# Patient Record
Sex: MALE | Race: WHITE | NOT HISPANIC OR LATINO
[De-identification: names, ages, dates, MRNs, and addresses within clinical notes are randomized per-mention and may not be internally consistent; named-entity substitution may affect disease eponyms.]

---

## 2019-12-23 ENCOUNTER — OTHER (OUTPATIENT)
Age: 74
End: 2019-12-23

## 2019-12-23 DIAGNOSIS — M25.561 PAIN IN RIGHT KNEE: ICD-10-CM

## 2019-12-23 DIAGNOSIS — M25.562 PAIN IN RIGHT KNEE: ICD-10-CM

## 2020-01-02 ENCOUNTER — APPOINTMENT (OUTPATIENT)
Dept: ORTHOPEDIC SURGERY | Facility: CLINIC | Age: 75
End: 2020-01-02

## 2020-07-24 ENCOUNTER — EMERGENCY (EMERGENCY)
Facility: HOSPITAL | Age: 75
LOS: 1 days | Discharge: HOME | End: 2020-07-24
Attending: EMERGENCY MEDICINE | Admitting: EMERGENCY MEDICINE
Payer: MEDICARE

## 2020-07-24 VITALS
OXYGEN SATURATION: 97 % | SYSTOLIC BLOOD PRESSURE: 180 MMHG | HEART RATE: 57 BPM | TEMPERATURE: 98 F | DIASTOLIC BLOOD PRESSURE: 90 MMHG | RESPIRATION RATE: 17 BRPM

## 2020-07-24 VITALS
HEART RATE: 84 BPM | HEIGHT: 68 IN | DIASTOLIC BLOOD PRESSURE: 90 MMHG | RESPIRATION RATE: 16 BRPM | OXYGEN SATURATION: 97 % | TEMPERATURE: 98 F | SYSTOLIC BLOOD PRESSURE: 140 MMHG | WEIGHT: 255.07 LBS

## 2020-07-24 DIAGNOSIS — Z98.89 OTHER SPECIFIED POSTPROCEDURAL STATES: Chronic | ICD-10-CM

## 2020-07-24 DIAGNOSIS — Z98.49 CATARACT EXTRACTION STATUS, UNSPECIFIED EYE: Chronic | ICD-10-CM

## 2020-07-24 DIAGNOSIS — L05.91 PILONIDAL CYST WITHOUT ABSCESS: Chronic | ICD-10-CM

## 2020-07-24 DIAGNOSIS — N44.00 TORSION OF TESTIS, UNSPECIFIED: Chronic | ICD-10-CM

## 2020-07-24 DIAGNOSIS — I66.9 OCCLUSION AND STENOSIS OF UNSPECIFIED CEREBRAL ARTERY: Chronic | ICD-10-CM

## 2020-07-24 PROCEDURE — 90715 TDAP VACCINE 7 YRS/> IM: CPT

## 2020-07-24 PROCEDURE — 90471 IMMUNIZATION ADMIN: CPT

## 2020-07-24 PROCEDURE — 99285 EMERGENCY DEPT VISIT HI MDM: CPT

## 2020-07-24 PROCEDURE — 73030 X-RAY EXAM OF SHOULDER: CPT | Mod: 26,LT

## 2020-07-24 PROCEDURE — 99284 EMERGENCY DEPT VISIT MOD MDM: CPT | Mod: 25

## 2020-07-24 PROCEDURE — 72125 CT NECK SPINE W/O DYE: CPT | Mod: 26

## 2020-07-24 PROCEDURE — 73030 X-RAY EXAM OF SHOULDER: CPT

## 2020-07-24 PROCEDURE — 70450 CT HEAD/BRAIN W/O DYE: CPT | Mod: 26

## 2020-07-24 PROCEDURE — 72125 CT NECK SPINE W/O DYE: CPT

## 2020-07-24 PROCEDURE — 70450 CT HEAD/BRAIN W/O DYE: CPT

## 2020-07-24 RX ORDER — TETANUS TOXOID, REDUCED DIPHTHERIA TOXOID AND ACELLULAR PERTUSSIS VACCINE, ADSORBED 5; 2.5; 8; 8; 2.5 [IU]/.5ML; [IU]/.5ML; UG/.5ML; UG/.5ML; UG/.5ML
0.5 SUSPENSION INTRAMUSCULAR ONCE
Refills: 0 | Status: COMPLETED | OUTPATIENT
Start: 2020-07-24 | End: 2020-07-24

## 2020-07-24 RX ADMIN — TETANUS TOXOID, REDUCED DIPHTHERIA TOXOID AND ACELLULAR PERTUSSIS VACCINE, ADSORBED 0.5 MILLILITER(S): 5; 2.5; 8; 8; 2.5 SUSPENSION INTRAMUSCULAR at 09:31

## 2020-07-24 NOTE — ED PROVIDER NOTE - CARE PLAN
Principal Discharge DX:	Closed head injury, initial encounter  Secondary Diagnosis:	Scalp abrasion  Secondary Diagnosis:	Fall from chair, initial encounter  Secondary Diagnosis:	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  Secondary Diagnosis:	Hepatitis C

## 2020-07-24 NOTE — ED ADULT NURSE NOTE - PSH
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  Had surgery ( April 2013 )  H/O hernia repair    Pilonidal cyst  Had surgery ( 1969 )  S/P arthroscopic knee surgery  Bilateral ( 2005 )  S/P cataract surgery  Bilateral  S/P tonsillectomy    Torsion of testicle  Had surgery at age 14

## 2020-07-24 NOTE — ED PROVIDER NOTE - OBJECTIVE STATEMENT
pt is a 76 yo male who is on plavix for stents sp acs event was sitting in folding chair eating breakfast when his chair broke and he fell back striking head against corner of a cabinet sustaining a lac and contusing his shoulder on left side. no loc no neck or back pain no other new complaints  he was bib ems who bandaged the wound and transported to er   pmd dr Jamel Bliss

## 2020-07-24 NOTE — ED PROVIDER NOTE - NSFOLLOWUPINSTRUCTIONS_ED_ALL_ED_FT
KEEP WOUND CLEAN AND DRY   MONITOR FOR HEAVY BLEEDING OR INFECTION  REVIEW ATTACHED HEAD INJURY AND ABRASION INSTRUCTIONS  FOLLOW UP WITH YOUR DOCTOR FOR RE-EVALUATION  RETURN FOR WORSENING SYMPTOMS OR ANY CONCERNS  FOR YOUR SHOULDER: THE RADIOLOGIST WILL REVIEW YOUR XRAY IF ANY DISCREPANCY IN THE READING WE WILL CALL YOU  FOLLOW UP WITH DR MENDEZ- ON CALL ORTHOPEDIST

## 2020-07-24 NOTE — ED PROVIDER NOTE - CONSTITUTIONAL, MLM
normal... Well appearing, obese,  awake, alert, oriented to person, place, time/situation and in no apparent distress.

## 2020-07-24 NOTE — ED PROVIDER NOTE - CARE PROVIDER_API CALL
Rishi Bonilla)  Orthopaedic Surgery  [address] [place] [address]  Phone: [phone]  Fax: [phone]  Follow Up Time:

## 2020-07-24 NOTE — ED ADULT NURSE NOTE - OBJECTIVE STATEMENT
patient arrived via EMS from group home to ED s/p fall through a chair. Superificial laceration to posterior head. c/o of head pain and left shoulder pain a/oX4. no LOC able to transfer from stretcher to bed with minimal assist. MD in to evaluate. Will continue to monitor and support. safety maintained. fall risk precautions/preventions implemented.

## 2020-07-24 NOTE — ED PROVIDER NOTE - MUSCULOSKELETAL, MLM
Spine appears normal, range of motion is not limited, no muscle or joint tenderness no loss of rom except for pt's arthritic knees affecting gait - old

## 2020-07-24 NOTE — ED ADULT TRIAGE NOTE - CHIEF COMPLAINT QUOTE
pt from group home fall out of chair and hit head on filing cabinet, no loc, lac to back of head, c/o let shoulder pain

## 2020-07-24 NOTE — ED PROVIDER NOTE - CHPI ED SYMPTOMS NEG
no deformity/no loss of consciousness/no tingling/no fever/no vomiting/no numbness/no weakness/no confusion

## 2020-07-24 NOTE — ED PROVIDER NOTE - ENMT, MLM
Airway patent, Nasal mucosa clear. Mouth with normal mucosa. Throat has no vesicles, no oropharyngeal exudates and uvula is midline. occipital skin tear no further bleeding no sutureable wound noted

## 2020-07-24 NOTE — ED PROVIDER NOTE - CLINICAL SUMMARY MEDICAL DECISION MAKING FREE TEXT BOX
pt is a 74 yo male who has cad dm ich on plavix sitting in chair when it broke he fell back struck head   ct ro ich ro fx xray shoulder ro fx  assess wound for needing closure (not needed at time of presentation after cleaning) will monitor neuro status and reeval

## 2020-07-24 NOTE — ED PROVIDER NOTE - PATIENT PORTAL LINK FT
You can access the FollowMyHealth Patient Portal offered by [facility] by registering at the following website: [email or web address]. By joining "[employer]’s FollowMyHealth portal, you will also be able to view your health information using other applications (apps) compatible with our system.

## 2020-11-27 ENCOUNTER — INPATIENT (INPATIENT)
Facility: HOSPITAL | Age: 75
LOS: 2 days | Discharge: HOME | DRG: 378 | End: 2020-11-30
Attending: INTERNAL MEDICINE | Admitting: STUDENT IN AN ORGANIZED HEALTH CARE EDUCATION/TRAINING PROGRAM
Payer: MEDICARE

## 2020-11-27 VITALS
OXYGEN SATURATION: 99 % | SYSTOLIC BLOOD PRESSURE: 176 MMHG | DIASTOLIC BLOOD PRESSURE: 84 MMHG | HEART RATE: 58 BPM | TEMPERATURE: 98 F | RESPIRATION RATE: 16 BRPM | WEIGHT: 220.02 LBS | HEIGHT: 68 IN

## 2020-11-27 DIAGNOSIS — Z98.89 OTHER SPECIFIED POSTPROCEDURAL STATES: Chronic | ICD-10-CM

## 2020-11-27 DIAGNOSIS — E78.5 HYPERLIPIDEMIA, UNSPECIFIED: ICD-10-CM

## 2020-11-27 DIAGNOSIS — Z98.49 CATARACT EXTRACTION STATUS, UNSPECIFIED EYE: Chronic | ICD-10-CM

## 2020-11-27 DIAGNOSIS — K57.90 DIVERTICULOSIS OF INTESTINE, PART UNSPECIFIED, WITHOUT PERFORATION OR ABSCESS WITHOUT BLEEDING: ICD-10-CM

## 2020-11-27 DIAGNOSIS — L05.91 PILONIDAL CYST WITHOUT ABSCESS: Chronic | ICD-10-CM

## 2020-11-27 DIAGNOSIS — K62.5 HEMORRHAGE OF ANUS AND RECTUM: ICD-10-CM

## 2020-11-27 DIAGNOSIS — N17.9 ACUTE KIDNEY FAILURE, UNSPECIFIED: ICD-10-CM

## 2020-11-27 DIAGNOSIS — N44.00 TORSION OF TESTIS, UNSPECIFIED: Chronic | ICD-10-CM

## 2020-11-27 DIAGNOSIS — I10 ESSENTIAL (PRIMARY) HYPERTENSION: ICD-10-CM

## 2020-11-27 DIAGNOSIS — I66.9 OCCLUSION AND STENOSIS OF UNSPECIFIED CEREBRAL ARTERY: Chronic | ICD-10-CM

## 2020-11-27 DIAGNOSIS — F41.9 ANXIETY DISORDER, UNSPECIFIED: ICD-10-CM

## 2020-11-27 DIAGNOSIS — Z29.9 ENCOUNTER FOR PROPHYLACTIC MEASURES, UNSPECIFIED: ICD-10-CM

## 2020-11-27 DIAGNOSIS — R07.9 CHEST PAIN, UNSPECIFIED: ICD-10-CM

## 2020-11-27 DIAGNOSIS — E11.9 TYPE 2 DIABETES MELLITUS WITHOUT COMPLICATIONS: ICD-10-CM

## 2020-11-27 DIAGNOSIS — I25.10 ATHEROSCLEROTIC HEART DISEASE OF NATIVE CORONARY ARTERY WITHOUT ANGINA PECTORIS: ICD-10-CM

## 2020-11-27 LAB
ABO RH CONFIRMATION: SIGNIFICANT CHANGE UP
ALBUMIN SERPL ELPH-MCNC: 3.4 G/DL — SIGNIFICANT CHANGE UP (ref 3.3–5)
ALP SERPL-CCNC: 30 U/L — LOW (ref 40–120)
ALT FLD-CCNC: 21 U/L — SIGNIFICANT CHANGE UP (ref 12–78)
ANION GAP SERPL CALC-SCNC: 9 MMOL/L — SIGNIFICANT CHANGE UP (ref 5–17)
APTT BLD: 31.1 SEC — SIGNIFICANT CHANGE UP (ref 27.5–35.5)
AST SERPL-CCNC: 18 U/L — SIGNIFICANT CHANGE UP (ref 15–37)
BASOPHILS # BLD AUTO: 0.02 K/UL — SIGNIFICANT CHANGE UP (ref 0–0.2)
BASOPHILS NFR BLD AUTO: 0.3 % — SIGNIFICANT CHANGE UP (ref 0–2)
BILIRUB SERPL-MCNC: 0.3 MG/DL — SIGNIFICANT CHANGE UP (ref 0.2–1.2)
BLD GP AB SCN SERPL QL: SIGNIFICANT CHANGE UP
BUN SERPL-MCNC: 32 MG/DL — HIGH (ref 7–23)
CALCIUM SERPL-MCNC: 8.8 MG/DL — SIGNIFICANT CHANGE UP (ref 8.5–10.1)
CHLORIDE SERPL-SCNC: 108 MMOL/L — SIGNIFICANT CHANGE UP (ref 96–108)
CK MB BLD-MCNC: 2.5 % — SIGNIFICANT CHANGE UP (ref 0–3.5)
CK MB CFR SERPL CALC: 1.8 NG/ML — SIGNIFICANT CHANGE UP (ref 0–3.6)
CK SERPL-CCNC: 73 U/L — SIGNIFICANT CHANGE UP (ref 26–308)
CO2 SERPL-SCNC: 25 MMOL/L — SIGNIFICANT CHANGE UP (ref 22–31)
CREAT SERPL-MCNC: 2.5 MG/DL — HIGH (ref 0.5–1.3)
EOSINOPHIL # BLD AUTO: 0.11 K/UL — SIGNIFICANT CHANGE UP (ref 0–0.5)
EOSINOPHIL NFR BLD AUTO: 1.9 % — SIGNIFICANT CHANGE UP (ref 0–6)
GLUCOSE SERPL-MCNC: 113 MG/DL — HIGH (ref 70–99)
HCT VFR BLD CALC: 21 % — CRITICAL LOW (ref 39–50)
HGB BLD-MCNC: 6.7 G/DL — CRITICAL LOW (ref 13–17)
IMM GRANULOCYTES NFR BLD AUTO: 0.3 % — SIGNIFICANT CHANGE UP (ref 0–1.5)
INR BLD: 1.12 RATIO — SIGNIFICANT CHANGE UP (ref 0.88–1.16)
LYMPHOCYTES # BLD AUTO: 1.49 K/UL — SIGNIFICANT CHANGE UP (ref 1–3.3)
LYMPHOCYTES # BLD AUTO: 25.8 % — SIGNIFICANT CHANGE UP (ref 13–44)
MANUAL SMEAR VERIFICATION: SIGNIFICANT CHANGE UP
MCHC RBC-ENTMCNC: 28.6 PG — SIGNIFICANT CHANGE UP (ref 27–34)
MCHC RBC-ENTMCNC: 31.9 GM/DL — LOW (ref 32–36)
MCV RBC AUTO: 89.7 FL — SIGNIFICANT CHANGE UP (ref 80–100)
MONOCYTES # BLD AUTO: 0.32 K/UL — SIGNIFICANT CHANGE UP (ref 0–0.9)
MONOCYTES NFR BLD AUTO: 5.5 % — SIGNIFICANT CHANGE UP (ref 2–14)
NEUTROPHILS # BLD AUTO: 3.81 K/UL — SIGNIFICANT CHANGE UP (ref 1.8–7.4)
NEUTROPHILS NFR BLD AUTO: 66.2 % — SIGNIFICANT CHANGE UP (ref 43–77)
NRBC # BLD: 0 /100 WBCS — SIGNIFICANT CHANGE UP (ref 0–0)
OB PNL STL: POSITIVE
PLAT MORPH BLD: NORMAL — SIGNIFICANT CHANGE UP
PLATELET # BLD AUTO: 289 K/UL — SIGNIFICANT CHANGE UP (ref 150–400)
POTASSIUM SERPL-MCNC: 3.8 MMOL/L — SIGNIFICANT CHANGE UP (ref 3.5–5.3)
POTASSIUM SERPL-SCNC: 3.8 MMOL/L — SIGNIFICANT CHANGE UP (ref 3.5–5.3)
PROT SERPL-MCNC: 6.8 G/DL — SIGNIFICANT CHANGE UP (ref 6–8.3)
PROTHROM AB SERPL-ACNC: 13 SEC — SIGNIFICANT CHANGE UP (ref 10.6–13.6)
RBC # BLD: 2.34 M/UL — LOW (ref 4.2–5.8)
RBC # FLD: 14 % — SIGNIFICANT CHANGE UP (ref 10.3–14.5)
RBC BLD AUTO: SIGNIFICANT CHANGE UP
SARS-COV-2 RNA SPEC QL NAA+PROBE: SIGNIFICANT CHANGE UP
SODIUM SERPL-SCNC: 142 MMOL/L — SIGNIFICANT CHANGE UP (ref 135–145)
TROPONIN I SERPL-MCNC: 0.08 NG/ML — HIGH (ref 0.01–0.04)
WBC # BLD: 5.77 K/UL — SIGNIFICANT CHANGE UP (ref 3.8–10.5)
WBC # FLD AUTO: 5.77 K/UL — SIGNIFICANT CHANGE UP (ref 3.8–10.5)

## 2020-11-27 PROCEDURE — 99285 EMERGENCY DEPT VISIT HI MDM: CPT

## 2020-11-27 PROCEDURE — 99223 1ST HOSP IP/OBS HIGH 75: CPT | Mod: GC,AI

## 2020-11-27 PROCEDURE — 93010 ELECTROCARDIOGRAM REPORT: CPT

## 2020-11-27 PROCEDURE — 71045 X-RAY EXAM CHEST 1 VIEW: CPT | Mod: 26

## 2020-11-27 PROCEDURE — 74176 CT ABD & PELVIS W/O CONTRAST: CPT | Mod: 26

## 2020-11-27 RX ORDER — DOXAZOSIN MESYLATE 4 MG
4 TABLET ORAL
Refills: 0 | Status: DISCONTINUED | OUTPATIENT
Start: 2020-11-27 | End: 2020-11-30

## 2020-11-27 RX ORDER — LAMOTRIGINE 25 MG/1
100 TABLET, ORALLY DISINTEGRATING ORAL DAILY
Refills: 0 | Status: DISCONTINUED | OUTPATIENT
Start: 2020-11-27 | End: 2020-11-30

## 2020-11-27 RX ORDER — GLUCAGON INJECTION, SOLUTION 0.5 MG/.1ML
1 INJECTION, SOLUTION SUBCUTANEOUS ONCE
Refills: 0 | Status: DISCONTINUED | OUTPATIENT
Start: 2020-11-27 | End: 2020-11-30

## 2020-11-27 RX ORDER — FENOFIBRATE,MICRONIZED 130 MG
145 CAPSULE ORAL DAILY
Refills: 0 | Status: DISCONTINUED | OUTPATIENT
Start: 2020-11-27 | End: 2020-11-30

## 2020-11-27 RX ORDER — DEXTROSE 50 % IN WATER 50 %
15 SYRINGE (ML) INTRAVENOUS ONCE
Refills: 0 | Status: DISCONTINUED | OUTPATIENT
Start: 2020-11-27 | End: 2020-11-30

## 2020-11-27 RX ORDER — PANTOPRAZOLE SODIUM 20 MG/1
40 TABLET, DELAYED RELEASE ORAL
Refills: 0 | Status: DISCONTINUED | OUTPATIENT
Start: 2020-11-27 | End: 2020-11-27

## 2020-11-27 RX ORDER — SODIUM CHLORIDE 9 MG/ML
1000 INJECTION, SOLUTION INTRAVENOUS
Refills: 0 | Status: DISCONTINUED | OUTPATIENT
Start: 2020-11-27 | End: 2020-11-30

## 2020-11-27 RX ORDER — VENLAFAXINE HCL 75 MG
150 CAPSULE, EXT RELEASE 24 HR ORAL DAILY
Refills: 0 | Status: DISCONTINUED | OUTPATIENT
Start: 2020-11-27 | End: 2020-11-30

## 2020-11-27 RX ORDER — MIRTAZAPINE 45 MG/1
30 TABLET, ORALLY DISINTEGRATING ORAL AT BEDTIME
Refills: 0 | Status: DISCONTINUED | OUTPATIENT
Start: 2020-11-27 | End: 2020-11-30

## 2020-11-27 RX ORDER — DOXEPIN HCL 100 MG
25 CAPSULE ORAL AT BEDTIME
Refills: 0 | Status: DISCONTINUED | OUTPATIENT
Start: 2020-11-27 | End: 2020-11-30

## 2020-11-27 RX ORDER — HYDROCHLOROTHIAZIDE 25 MG
12.5 TABLET ORAL DAILY
Refills: 0 | Status: DISCONTINUED | OUTPATIENT
Start: 2020-11-27 | End: 2020-11-27

## 2020-11-27 RX ORDER — LABETALOL HCL 100 MG
300 TABLET ORAL
Refills: 0 | Status: DISCONTINUED | OUTPATIENT
Start: 2020-11-27 | End: 2020-11-28

## 2020-11-27 RX ORDER — DEXTROSE 50 % IN WATER 50 %
25 SYRINGE (ML) INTRAVENOUS ONCE
Refills: 0 | Status: DISCONTINUED | OUTPATIENT
Start: 2020-11-27 | End: 2020-11-30

## 2020-11-27 RX ORDER — ATORVASTATIN CALCIUM 80 MG/1
40 TABLET, FILM COATED ORAL AT BEDTIME
Refills: 0 | Status: DISCONTINUED | OUTPATIENT
Start: 2020-11-27 | End: 2020-11-30

## 2020-11-27 RX ORDER — DEXTROSE 50 % IN WATER 50 %
12.5 SYRINGE (ML) INTRAVENOUS ONCE
Refills: 0 | Status: DISCONTINUED | OUTPATIENT
Start: 2020-11-27 | End: 2020-11-30

## 2020-11-27 RX ORDER — AMLODIPINE BESYLATE 2.5 MG/1
10 TABLET ORAL DAILY
Refills: 0 | Status: DISCONTINUED | OUTPATIENT
Start: 2020-11-27 | End: 2020-11-30

## 2020-11-27 RX ORDER — INSULIN LISPRO 100/ML
VIAL (ML) SUBCUTANEOUS EVERY 6 HOURS
Refills: 0 | Status: DISCONTINUED | OUTPATIENT
Start: 2020-11-27 | End: 2020-11-29

## 2020-11-27 RX ORDER — PANTOPRAZOLE SODIUM 20 MG/1
8 TABLET, DELAYED RELEASE ORAL
Qty: 80 | Refills: 0 | Status: DISCONTINUED | OUTPATIENT
Start: 2020-11-27 | End: 2020-11-30

## 2020-11-27 RX ORDER — OXYBUTYNIN CHLORIDE 5 MG
15 TABLET ORAL DAILY
Refills: 0 | Status: DISCONTINUED | OUTPATIENT
Start: 2020-11-27 | End: 2020-11-30

## 2020-11-27 RX ORDER — LABETALOL HCL 100 MG
300 TABLET ORAL
Refills: 0 | Status: DISCONTINUED | OUTPATIENT
Start: 2020-11-27 | End: 2020-11-27

## 2020-11-27 RX ORDER — CHOLECALCIFEROL (VITAMIN D3) 125 MCG
5000 CAPSULE ORAL DAILY
Refills: 0 | Status: DISCONTINUED | OUTPATIENT
Start: 2020-11-27 | End: 2020-11-30

## 2020-11-27 RX ORDER — PANTOPRAZOLE SODIUM 20 MG/1
40 TABLET, DELAYED RELEASE ORAL ONCE
Refills: 0 | Status: COMPLETED | OUTPATIENT
Start: 2020-11-27 | End: 2020-11-27

## 2020-11-27 RX ORDER — SODIUM CHLORIDE 9 MG/ML
1000 INJECTION INTRAMUSCULAR; INTRAVENOUS; SUBCUTANEOUS
Refills: 0 | Status: DISCONTINUED | OUTPATIENT
Start: 2020-11-27 | End: 2020-11-28

## 2020-11-27 RX ADMIN — Medication 25 MILLIGRAM(S): at 23:35

## 2020-11-27 RX ADMIN — PANTOPRAZOLE SODIUM 10 MG/HR: 20 TABLET, DELAYED RELEASE ORAL at 18:32

## 2020-11-27 RX ADMIN — ATORVASTATIN CALCIUM 40 MILLIGRAM(S): 80 TABLET, FILM COATED ORAL at 22:02

## 2020-11-27 RX ADMIN — PANTOPRAZOLE SODIUM 40 MILLIGRAM(S): 20 TABLET, DELAYED RELEASE ORAL at 18:32

## 2020-11-27 RX ADMIN — AMLODIPINE BESYLATE 10 MILLIGRAM(S): 2.5 TABLET ORAL at 22:07

## 2020-11-27 RX ADMIN — MIRTAZAPINE 30 MILLIGRAM(S): 45 TABLET, ORALLY DISINTEGRATING ORAL at 23:20

## 2020-11-27 RX ADMIN — Medication 300 MILLIGRAM(S): at 23:19

## 2020-11-27 NOTE — H&P ADULT - PROBLEM SELECTOR PLAN 4
Pt with R side sharp CP x3 days and L side dull CP for 1 day, now resolved. No known hx heart failure.  -pt currently without symptoms  -will check cardiac enzymes  -TTE  -Consult cardiology [facility] Pt with R side sharp CP x3 days and L side dull CP for 1 day, now resolved. No known hx heart failure. ?related to symptomatic anemia in setting of CAD.  -pt currently without symptoms - EKG SR with 1st deg AV block RBBB twi V3-V6.  -will check cardiac enzymes, trend if positive  -follow up TTE  -Consult cardiology [facility]

## 2020-11-27 NOTE — H&P ADULT - PROBLEM SELECTOR PLAN 8
Chronic. Home meds include crestor 10mg, fenofibrate 145mg, vascepa 1g BID.  -continue statin, fenofibrate, vascepa

## 2020-11-27 NOTE — H&P ADULT - NSHPOUTPATIENTPROVIDERS_GEN_ALL_CORE
PMKAYLEN Ferguson PMD - Dr. Jamel Man PMD - Dr. Erich Man (notified. cell# [phone])  Cardio - Does not have one currently, Dr. Man will refer him to Dr. Burciaga in the same office ([facility]) PMD - Dr. Erich Man (notified. cell# [phone])  Cardio - Does not have one currently, Dr. Man will refer him to Dr. Burciaga in the same office ([facility])  Psych: PONCHO Ventura

## 2020-11-27 NOTE — H&P ADULT - NSICDXFAMILYHX_GEN_ALL_CORE_FT
FAMILY HISTORY:  FHx: throat cancer     FAMILY HISTORY:  FHx: throat cancer  No family history of colorectal cancer

## 2020-11-27 NOTE — H&P ADULT - ASSESSMENT
79 y/o M with PMHx of NIDDM2, CAD/MI s/p stents (on plavix), Lupus, HTN, Hx of blood clots in brain (s/p surgery 2013) admitted for gastrointestinal hemorrhage. 77 y/o M with PMHx of NIDDM2, CAD/MI s/p stents 4 years ago (on plavix), Lupus, HTN, HepC, HLD, diverticulosis, 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 Admitted for gastrointestinal hemorrhage. 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 Admitted for gastrointestinal hemorrhage.

## 2020-11-27 NOTE — H&P ADULT - NSICDXPASTMEDICALHX_GEN_ALL_CORE_FT
PAST MEDICAL HISTORY:  Anxiety and depression     CAD (coronary artery disease) s/p stents    Diabetes mellitus     Hepatitis C     Hypertension     Lupus      PAST MEDICAL HISTORY:  Anxiety and depression     CAD (coronary artery disease) s/p stents    Diabetes mellitus     Diverticulosis     Hepatitis C     Hyperlipidemia     Hypertension     Lupus

## 2020-11-27 NOTE — H&P ADULT - PROBLEM SELECTOR PLAN 10
SCDs given current GI bleed.    IMPROVE VTE Individual Risk Assessment          RISK                                                          Points  [  ] Previous VTE                                                3  [  ] Thrombophilia                                             2  [  ] Lower limb paralysis                                   2        (unable to hold up >15 seconds)    [  ] Current Cancer                                             2         (within 6 months)  [  ] Immobilization > 24 hrs                              1  [  ] ICU/CCU stay > 24 hours                             1  [ x ] Age > 60                                                         1    IMPROVE VTE Score:         [    1     ] SCDs given current GI bleed.  IMPROVE VTE Score:         [    1     ]      11. Hepatitis C  -f/u AM Hep C panel    12. Lupus  -chronic, no active meds  -hold home NSAIDs in setting of bleed

## 2020-11-27 NOTE — ED PROVIDER NOTE - OBJECTIVE STATEMENT
75 male on plavix PMH CADx 3 cardiac stents presents to ER by ambulance with report of rectal bleeding. Patient states he has been having rectal bleeding, bright red blood and also clots for 3 days, but has gotten better today, denies fever, no vomiting. Patient states he feels some dizziness when walking.

## 2020-11-27 NOTE — H&P ADULT - NSHPREVIEWOFSYSTEMS_GEN_ALL_CORE
CONSTITUTIONAL: denies fever, chills, fatigue, weakness  HEENT: denies blurred vision, sore throat  SKIN: denies new lesions, rash  CARDIOVASCULAR: admits intermittent R side sharp chest pain x3 days and L side dull chest pain 1 day that have now resolved  RESPIRATORY: denies shortness of breath, sputum production  GASTROINTESTINAL: denies nausea, vomiting, diarrhea, abdominal pain  GENITOURINARY: denies dysuria, discharge  NEUROLOGICAL: denies numbness, headache, focal weakness  MUSCULOSKELETAL: denies new joint pain, muscle aches  HEMATOLOGIC: admits dark red rectal bleeding x3 days  LYMPHATICS: denies extremity swelling CONSTITUTIONAL: denies fever, chills, fatigue, weakness  HEENT: denies blurred vision, sore throat  SKIN: denies new lesions, rash  CARDIOVASCULAR: admits intermittent R side sharp chest pain x3 days and L side dull chest pain 1 day that have now resolved  RESPIRATORY: denies shortness of breath, sputum production  GASTROINTESTINAL: denies nausea, vomiting, diarrhea, abdominal pain  GENITOURINARY: denies dysuria, discharge  NEUROLOGICAL: denies numbness, headache, focal weakness  MUSCULOSKELETAL: denies new joint pain, muscle aches  HEMATOLOGIC: admits dark red rectal bleeding x3 days with clots  LYMPHATICS: denies extremity swelling

## 2020-11-27 NOTE — H&P ADULT - PROBLEM SELECTOR PLAN 2
Chronic.  -likely source of bleeding, however will consult GI  -No current diverticulitis, monitor for s/s such as abdominal pain, fever, WBC count

## 2020-11-27 NOTE — H&P ADULT - PROBLEM SELECTOR PLAN 6
Non-insulin dependent T2DM. Home meds include metformin 500mg BID.  -hold home oral metformin  -ISS  -FSGs  -hypoglycemia protocol  -f/u AM HbA1C  -peripheral neuropathy noted on exam - pt to f/u with podiatry outpatient Non-insulin dependent T2DM. Home meds include metformin 500mg BID.  -hold home oral metformin  -start low dose insulin corrective scale while NPO  -monitor blood glucose  -hypoglycemia protocol  -f/u AM HbA1C  -peripheral neuropathy noted on exam - pt to f/u with podiatry outpatient for monofilament testing. Bedside TPD revealed lack of sensation to dull/sharp.  -follow up B12 and folate levels Non-insulin dependent T2DM. Home meds include metformin 500mg BID.  -hold home oral metformin  -start low dose insulin corrective scale while NPO  -monitor blood glucose  -hypoglycemia protocol  -f/u AM HbA1C  -follow up B12 and folate levels  -peripheral neuropathy noted on exam - pt to f/u with podiatry outpatient for monofilament testing. Bedside TPD revealed lack of sensation to dull/sharp.

## 2020-11-27 NOTE — H&P ADULT - NSICDXPASTSURGICALHX_GEN_ALL_CORE_FT
PAST SURGICAL HISTORY:  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 Had surgery ( April 2013 )    H/O hernia repair     Pilonidal cyst Had surgery ( 1969 )    S/P arthroscopic knee surgery Bilateral ( 2005 )    S/P cataract surgery Bilateral    S/P tonsillectomy     Torsion of testicle Had surgery at age 14

## 2020-11-27 NOTE — H&P ADULT - PROBLEM SELECTOR PLAN 5
Cr 2.50 on admission. Baseline appears to be 1.20 in 2016.  -Pt told 4 years ago during stents to have kidney function checked, did not follow up  -will give IVF  -f/u AM BMP  -hold home meloxicam  -avoid nephrotoxic agents  -of note, patient's personal med list includes digoxin 250mcg daily. Pt unsure why taking (no hx afib), and med is not on outpatient med rec. Will hold at this time given ARIELLA. Cr 2.50 on admission. Was 1.6 in January.  -Pt told 4 years ago during stents to have kidney function checked, did not follow up  -will give IVF  -f/u AM BMP  -hold home meloxicam  -avoid nephrotoxic agents  -of note, patient's personal med list includes digoxin 250mcg daily. Spoke with PMD, he is unsure why pt is taking it. Will hold at this time given ARIELLA. Cr 2.50 on admission. Was 1.6 in January.  -Pt told 4 years ago during stents to have kidney function checked, did not follow up  -will give IVF  -f/u AM BMP  -hold home meloxicam and HCTZ  -avoid nephrotoxic agents  -of note, patient's personal med list includes digoxin 250mcg daily. Spoke with PMD, he is unsure why pt is taking it. Will hold at this time given ARIELLA. Cr 2.50 on admission. Was 1.6 in January. Likely ARIELLA on CKD3  -Pt told 4 years ago during stents to have kidney function checked, did not follow up  -will give IVF  -f/u AM BMP  -hold home meloxicam and HCTZ  -avoid nephrotoxic agents  -of note, patient's personal med list includes digoxin 250mcg daily. Spoke with PMD, he is unsure why pt is taking it. Will hold at this time given ARIELLA.

## 2020-11-27 NOTE — H&P ADULT - PROBLEM SELECTOR PLAN 7
Chronic. Home meds include amlodipine 10mg, HCTZ 12.5mg.  -continue BP meds  -monitor routine hemodynamics Chronic. Home meds include amlodipine 10mg, HCTZ 12.5mg, labetalol 300mg BID.  -continue BP meds with hold parameters except HCTZ given ARIELLA  -monitor routine hemodynamics Chronic, patient with history of malignant HTN per PMD. Home meds include amlodipine 10mg, HCTZ 12.5mg, labetalol 300mg BID.  -continue BP meds with hold parameters except HCTZ given ARIELLA  -monitor routine hemodynamics

## 2020-11-27 NOTE — H&P ADULT - NSHPPHYSICALEXAM_GEN_ALL_CORE
T(C): 36.9 (11-27-20 @ 17:08), Max: 36.9 (11-27-20 @ 17:08)  HR: 58 (11-27-20 @ 17:08) (58 - 58)  BP: 176/84 (11-27-20 @ 17:08) (176/84 - 176/84)  RR: 16 (11-27-20 @ 17:08) (16 - 16)  SpO2: 99% (11-27-20 @ 17:08) (99% - 99%)    General: No apparent distress  Head: normocephalic, atraumatic  Eyes: EOMI, anicteric  ENT: moist mucous membranes, no pharyngeal exudates  Heart: RRR, S1, S2, no murmurs  Chest: CTA b/l, no rales, rhonchi, or wheezes  Abd: BS+, soft, NT, ND  Back: no CVA tenderness  Extr: no edema or cyanosis  Neuro: AA&Ox3, no focal weakness, sensation to light touch intact  Psych: normal affect T(C): 36.9 (11-27-20 @ 17:08), Max: 36.9 (11-27-20 @ 17:08)  HR: 58 (11-27-20 @ 17:08) (58 - 58)  BP: 176/84 (11-27-20 @ 17:08) (176/84 - 176/84)  RR: 16 (11-27-20 @ 17:08) (16 - 16)  SpO2: 99% (11-27-20 @ 17:08) (99% - 99%)    General: No apparent distress  Head: normocephalic, atraumatic  Eyes: EOMI, anicteric  ENT: moist mucous membranes, no pharyngeal exudates  Heart: bradycardic, S1, S2, no murmurs  Chest: CTA b/l, no rales, rhonchi, or wheezes  Abd: BS+, soft, NT, ND  Back: no CVA tenderness  Extr: no edema or cyanosis  Neuro: AA&Ox3, no focal weakness. Decreased sensation to touch b/l feet mostly on toes  Psych: normal affect T(C): 36.9 (11-27-20 @ 17:08), Max: 36.9 (11-27-20 @ 17:08)  HR: 58 (11-27-20 @ 17:08) (58 - 58)  BP: 176/84 (11-27-20 @ 17:08) (176/84 - 176/84)  RR: 16 (11-27-20 @ 17:08) (16 - 16)  SpO2: 99% (11-27-20 @ 17:08) (99% - 99%)    General: No apparent distress  Head: normocephalic, atraumatic  Eyes: EOMI, anicteric  ENT: moist mucous membranes, no pharyngeal exudates  Heart: bradycardic, S1, S2, no murmurs  Chest: CTA b/l, no rales, rhonchi, or wheezes  Abd: BS+, soft, NT, ND  Back: no CVA tenderness  Extr: no edema or cyanosis  Neuro: AA&Ox3, no focal weakness. Decreased sensation to touch b/l feet mostly on toes. 2 point discrimination performed (no microfilament available) and patient unable to tell difference between sharp and dull in dorsum and plantar aspect of foot.  Psych: normal affect

## 2020-11-27 NOTE — H&P ADULT - HISTORY OF PRESENT ILLNESS
79 y/o M with PMHx of NIDDM2, CAD/MI s/p stents (on plavix), Lupus, HTN, Hx of blood clots in brain (s/p surgery 2013) presented to the ED    In the ED,  Vital Signs T(F): 98.4 HR: 58 BP: 176/84 RR: 16 SpO2: 99% 77 y/o M with PMHx of NIDDM2, CAD/MI s/p stents 4 years ago (on plavix), Lupus, HTN, HepC, HLD, diverticulosis, 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 Patient describes the blood as dark red and occurs 2-3 times per day. Blood is not associated with bowel movements or mixed in stool, patient says bleeding just happens randomly. Denies n/v, abdominal pain or cramping. Endorses decreased appetite for past 1-2 days and some weight loss. Denies trauma or inciting event. Patient last saw his PMD about 4 weeks ago. Of note, patient states for past 3 days he has had right side chest pain described as sharp, and today had left side chest pain around his shoulder described as dull. No precipitating factors identified, pain occurred at rest. Denies fever, SOB, dysuria, weakness.     In the ED,  Vital Signs T(F): 98.4 HR: 58 BP: 176/84 RR: 16 SpO2: 99%   EKG: sinus bradycardia HR 54 with 1st degree AV block. Incomplete RBBB. LVH  Labs: H/H 6.7/21, PT/PTT/INR normal, Bun/Cr 32/2.50, FOBT positive  CT a/p: Extensive sigmoid diverticulosis without diverticulitis.  Received: 2U PRBCs, Protonix 40mg IV, DTaP vaccine 79 y/o M with PMHx of NIDDM2, CAD s/p stents 4 years ago (on plavix), Lupus, HTN, HepC, HLD, diverticulosis, 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 Patient describes the blood as dark red and occurs 2-3 times per day. Blood is not associated with bowel movements or mixed in stool, patient says bleeding just happens randomly. Denies n/v, abdominal pain or cramping. Endorses decreased appetite for past 1-2 days and some weight loss. Denies trauma or inciting event. Has been taking meloxicam prescribed by . Patient last saw his PMD Dr. Man about 4 weeks ago. Of note, patient states for past 3 days he has had right side chest pain described as sharp, and today had left side chest pain around his shoulder described as dull. No precipitating factors identified, pain occurred at rest. Denies fever, SOB, dysuria, weakness. Spoke to PMD Dr. Man on admission - he states patient has history of malignant HTN, no known CHF or atrial fibrillation. Patient stopped following up with old cardiologist so plavix and digoxin were just continued. His last bloodwork for the patient revealed an BUN/Cr of 25/1.6 and H/H of 11.5/35. States that the meloxicam was prescribed from his end months ago and saw voltaren gel on his list. Known iron deficiency anemia and he was recommended for a colonoscopy per PMD, but patient does not recall this recommendation. Per patient, his last colonoscopy was 5 years ago and was "clean" but has known diverticulosis. No other complaints on admission.    In the ED,  Vital Signs T(F): 98.4 HR: 58 BP: 176/84 RR: 16 SpO2: 99%   EKG: sinus bradycardia HR 54 with 1st degree AV block. Incomplete RBBB. LVH. TWI V3-V6.  Labs: H/H 6.7/21, PT/PTT/INR normal, Bun/Cr 32/2.50, FOBT positive  CT a/p: Extensive sigmoid diverticulosis without diverticulitis.  Received: 2U PRBCs, Protonix 40mg IV, DTaP vaccine 77 y/o M with PMHx of NIDDM2, CAD s/p stents 4 years ago (on plavix), Lupus, HTN, HepC, HLD, diverticulosis, 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 Patient describes the blood as dark red and occurs 2-3 times per day. Blood is not associated with bowel movements or mixed in stool, patient says bleeding just happens randomly. Denies n/v, abdominal pain or cramping. Endorses decreased appetite for past 1-2 days and some weight loss. Denies trauma or inciting event. Has been taking meloxicam prescribed by . Patient last saw his PMD Dr. Man about 4 weeks ago. Of note, patient states for past 3 days he has had right side chest pain described as sharp, and today had left side chest pain around his shoulder described as dull. No precipitating factors identified, pain occurred at rest. Denies fever, SOB, dysuria, weakness. Spoke to PMD Dr. Man on admission - he states patient has history of malignant HTN, no known CHF or atrial fibrillation. Patient stopped following up with old cardiologist so plavix and digoxin were just continued. His last bloodwork for the patient revealed an BUN/Cr of 25/1.6 and H/H of 11.5/35. States that the meloxicam was prescribed from his end months ago and saw voltaren gel on his list. Known iron deficiency anemia and he was recommended for a colonoscopy per PMD, but patient does not recall this recommendation. Per patient, his last colonoscopy was 5 years ago and was "clean" but has known diverticulosis. No other complaints on admission.    In the ED,  Vital Signs T(F): 98.4 HR: 58 BP: 176/84 RR: 16 SpO2: 99%   EKG: sinus bradycardia HR 54 with 1st degree AV block. Incomplete RBBB. LVH. TWI V3-V6.  Labs: H/H 6.7/21, PT/PTT/INR normal, Bun/Cr 32/2.50, FOBT positive  CXR: no acute infiltrate or effusion on personal read  CT a/p: Extensive sigmoid diverticulosis without diverticulitis.  Received: 2U PRBCs, Protonix 40mg IV, DTaP vaccine 77 y/o M with PMHx of NIDDM2, CAD s/p stents 4 years ago (on plavix), Lupus, HTN, HepC, HLD, diverticulosis, 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 Patient describes the blood as dark red and occurs 2-3 times per day. Blood is not associated with bowel movements or mixed in stool, patient says bleeding just happens randomly. Denies n/v, abdominal pain or cramping. Endorses decreased appetite for past 1-2 days and some weight loss. Denies trauma or inciting event. Has been taking meloxicam prescribed by Psych NP oDmingo Ventura. Patient last saw his PMD Dr. Man about 4 weeks ago. Of note, patient states for past 3 days he has had right side chest pain described as sharp, and today had left side chest pain around his shoulder described as dull. No precipitating factors identified, pain occurred at rest. Denies fever, SOB, dysuria, weakness. Spoke to PMD Dr. Man on admission - he states patient has history of malignant HTN, no known CHF or atrial fibrillation. Patient stopped following up with old cardiologist so plavix and digoxin were just continued. His last bloodwork for the patient revealed an BUN/Cr of 25/1.6 and H/H of 11.5/35. States that the meloxicam was prescribed from his end months ago and saw voltaren gel on his list. Known iron deficiency anemia and he was recommended for a colonoscopy per PMD, but patient does not recall this recommendation. Per patient, his last colonoscopy was 5 years ago and was "clean" but has known diverticulosis. No other complaints on admission.    In the ED,  Vital Signs T(F): 98.4 HR: 58 BP: 176/84 RR: 16 SpO2: 99%   EKG: sinus bradycardia HR 54 with 1st degree AV block. Incomplete RBBB. LVH. TWI V3-V6.  Labs: H/H 6.7/21, PT/PTT/INR normal, Bun/Cr 32/2.50, FOBT positive  CXR: no acute infiltrate or effusion on personal read  CT a/p: Extensive sigmoid diverticulosis without diverticulitis.  Received: 2U PRBCs, Protonix 40mg IV, DTaP vaccine

## 2020-11-27 NOTE — H&P ADULT - PROBLEM SELECTOR PLAN 3
S/p stents, last 4 years ago. Home meds include plavix 75mg, labetalol hcl 300mg BID, crestor 10mg.  -hold plavix in setting of GI bleed, and given stents were 4 years ago  -continue BB with hold parameters, especially given bradycardia  -continue statin S/p stents, last 4 years ago. Home meds include plavix 75mg, labetalol hcl 300mg BID, crestor 10mg.  -hold plavix in setting of GI bleed, and given stents were 4 years ago  -continue BB with hold parameters, especially given bradycardia  -continue statin  -unclear why patient is on digoxin - has not followed up with cardiology (physician left the practice) and no known hx of arrhythmia. Hold for now. Check dig level.  -check echocardiogram  -cardio consult Dr. Monterroso

## 2020-11-27 NOTE — H&P ADULT - PROBLEM SELECTOR PLAN 9
Chronic. Home meds include aripiprazole 400mg monthly injections, lamictal 100mg, remeron 30mg, effexor 150mg, buspar 15mg BID.  -continue lamictal, remeron, effexor, buspar    11. Hepatitis C  -f/u AM Hep C panel    12. Lupus  -chronic Chronic. Home meds include aripiprazole 400mg monthly injections, lamictal 100mg, remeron 30mg, effexor 150mg, buspar 15mg BID.  -continue lamictal, remeron, effexor, buspar

## 2020-11-27 NOTE — H&P ADULT - PROBLEM SELECTOR PLAN 1
Pt with dark red blood per rectum x3 days. Hb 6.7 on admission. Likely 2/2 chronic diverticulosis.  -CT a/p: Extensive sigmoid diverticulosis without diverticulitis.  -S/p 2U PRBCs in ED  -f/u AM CBC  -transfuse for Hb<7.0  -VS currently stable, continue to monitor  -hold home meloxicam  -Consult GI Dr. Bah Pt with dark red blood per rectum x3 days also with passage of clots. Hb 6.7 on admission. Likely diverticular bleed. Baseline H/H per PMD was 11.5/35. Patient also on meloxicam and plavix - will keep on PPI gtt per GI pending GI eval.  -CT a/p: Extensive sigmoid diverticulosis without diverticulitis.  -ordered for 2U PRBCs in ED  -f/u AM CBC  -transfuse for Hb<8.0 given CAD and stents  -VS currently stable, continue to monitor  -hold home meloxicam and plavix  -Consult GI Dr. Bah Pt with dark red blood per rectum x3 days also with passage of clots. Hb 6.7 on admission. Likely diverticular bleed. Baseline H/H per PMD was 11.5/35. Patient also on meloxicam and plavix - will keep on PPI gtt per GI pending GI eval.  -CT a/p: Extensive sigmoid diverticulosis without diverticulitis.  -ordered for 2U PRBCs in ED  -f/u AM CBC  -transfuse for Hb<8.0 given CAD and stents  -VS currently stable, continue to monitor  -hold home meloxicam and plavix  -NPO  -Consult GI Dr. Bah

## 2020-11-27 NOTE — H&P ADULT - NSHPSOCIALHISTORY_GEN_ALL_CORE
Lives in group home.  Independent in ADLs.  Former smoker, 15 pack-year history, quit in 1980.  Denies current EtOH or illicit drug use. Formerly used street drugs.  Health maintenance - Last colonoscopy about 4 years ago. Lives in adult group home.  Independent in ADLs.  Former smoker, 15 pack-year history, quit in 1980.  Denies current EtOH or illicit drug use. Formerly used street drugs.  Health maintenance - Last colonoscopy about 4-5 years ago.

## 2020-11-28 DIAGNOSIS — B19.20 UNSPECIFIED VIRAL HEPATITIS C WITHOUT HEPATIC COMA: ICD-10-CM

## 2020-11-28 LAB
A1C WITH ESTIMATED AVERAGE GLUCOSE RESULT: 6 % — HIGH (ref 4–5.6)
ALBUMIN SERPL ELPH-MCNC: 3.4 G/DL — SIGNIFICANT CHANGE UP (ref 3.3–5)
ALP SERPL-CCNC: 31 U/L — LOW (ref 40–120)
ALT FLD-CCNC: 22 U/L — SIGNIFICANT CHANGE UP (ref 12–78)
ANION GAP SERPL CALC-SCNC: 8 MMOL/L — SIGNIFICANT CHANGE UP (ref 5–17)
APTT BLD: 32 SEC — SIGNIFICANT CHANGE UP (ref 27.5–35.5)
AST SERPL-CCNC: 22 U/L — SIGNIFICANT CHANGE UP (ref 15–37)
BASOPHILS # BLD AUTO: 0.03 K/UL — SIGNIFICANT CHANGE UP (ref 0–0.2)
BASOPHILS NFR BLD AUTO: 0.4 % — SIGNIFICANT CHANGE UP (ref 0–2)
BILIRUB SERPL-MCNC: 0.5 MG/DL — SIGNIFICANT CHANGE UP (ref 0.2–1.2)
BUN SERPL-MCNC: 25 MG/DL — HIGH (ref 7–23)
CALCIUM SERPL-MCNC: 8.9 MG/DL — SIGNIFICANT CHANGE UP (ref 8.5–10.1)
CHLORIDE SERPL-SCNC: 109 MMOL/L — HIGH (ref 96–108)
CK MB BLD-MCNC: 2.3 % — SIGNIFICANT CHANGE UP (ref 0–3.5)
CK MB CFR SERPL CALC: 1.8 NG/ML — SIGNIFICANT CHANGE UP (ref 0–3.6)
CK SERPL-CCNC: 77 U/L — SIGNIFICANT CHANGE UP (ref 26–308)
CO2 SERPL-SCNC: 25 MMOL/L — SIGNIFICANT CHANGE UP (ref 22–31)
CREAT SERPL-MCNC: 2.3 MG/DL — HIGH (ref 0.5–1.3)
DIGOXIN SERPL-MCNC: 1.4 NG/ML — SIGNIFICANT CHANGE UP (ref 0.8–2)
EOSINOPHIL # BLD AUTO: 0.14 K/UL — SIGNIFICANT CHANGE UP (ref 0–0.5)
EOSINOPHIL NFR BLD AUTO: 1.9 % — SIGNIFICANT CHANGE UP (ref 0–6)
ESTIMATED AVERAGE GLUCOSE: 126 MG/DL — HIGH (ref 68–114)
FERRITIN SERPL-MCNC: 12 NG/ML — LOW (ref 30–400)
FOLATE SERPL-MCNC: 6.2 NG/ML — SIGNIFICANT CHANGE UP
GLUCOSE SERPL-MCNC: 119 MG/DL — HIGH (ref 70–99)
HCT VFR BLD CALC: 24.8 % — LOW (ref 39–50)
HCT VFR BLD CALC: 25.3 % — LOW (ref 39–50)
HCT VFR BLD CALC: 27.6 % — LOW (ref 39–50)
HCV AB S/CO SERPL IA: 1.02 S/CO — HIGH (ref 0–0.99)
HCV AB SERPL-IMP: SIGNIFICANT CHANGE UP
HGB BLD-MCNC: 8 G/DL — LOW (ref 13–17)
HGB BLD-MCNC: 8.2 G/DL — LOW (ref 13–17)
HGB BLD-MCNC: 9 G/DL — LOW (ref 13–17)
IMM GRANULOCYTES NFR BLD AUTO: 0.1 % — SIGNIFICANT CHANGE UP (ref 0–1.5)
INR BLD: 1.14 RATIO — SIGNIFICANT CHANGE UP (ref 0.88–1.16)
IRON SATN MFR SERPL: 17 UG/DL — LOW (ref 45–165)
IRON SATN MFR SERPL: 4 % — LOW (ref 16–55)
LYMPHOCYTES # BLD AUTO: 1.69 K/UL — SIGNIFICANT CHANGE UP (ref 1–3.3)
LYMPHOCYTES # BLD AUTO: 23.4 % — SIGNIFICANT CHANGE UP (ref 13–44)
MAGNESIUM SERPL-MCNC: 1.9 MG/DL — SIGNIFICANT CHANGE UP (ref 1.6–2.6)
MCHC RBC-ENTMCNC: 28.5 PG — SIGNIFICANT CHANGE UP (ref 27–34)
MCHC RBC-ENTMCNC: 32.4 GM/DL — SIGNIFICANT CHANGE UP (ref 32–36)
MCV RBC AUTO: 87.8 FL — SIGNIFICANT CHANGE UP (ref 80–100)
MONOCYTES # BLD AUTO: 0.4 K/UL — SIGNIFICANT CHANGE UP (ref 0–0.9)
MONOCYTES NFR BLD AUTO: 5.5 % — SIGNIFICANT CHANGE UP (ref 2–14)
NEUTROPHILS # BLD AUTO: 4.95 K/UL — SIGNIFICANT CHANGE UP (ref 1.8–7.4)
NEUTROPHILS NFR BLD AUTO: 68.7 % — SIGNIFICANT CHANGE UP (ref 43–77)
NRBC # BLD: 0 /100 WBCS — SIGNIFICANT CHANGE UP (ref 0–0)
PHOSPHATE SERPL-MCNC: 3 MG/DL — SIGNIFICANT CHANGE UP (ref 2.5–4.5)
PLATELET # BLD AUTO: 271 K/UL — SIGNIFICANT CHANGE UP (ref 150–400)
POTASSIUM SERPL-MCNC: 3.5 MMOL/L — SIGNIFICANT CHANGE UP (ref 3.5–5.3)
POTASSIUM SERPL-SCNC: 3.5 MMOL/L — SIGNIFICANT CHANGE UP (ref 3.5–5.3)
PROT SERPL-MCNC: 6.5 G/DL — SIGNIFICANT CHANGE UP (ref 6–8.3)
PROTHROM AB SERPL-ACNC: 13.3 SEC — SIGNIFICANT CHANGE UP (ref 10.6–13.6)
RBC # BLD: 2.88 M/UL — LOW (ref 4.2–5.8)
RBC # FLD: 13.7 % — SIGNIFICANT CHANGE UP (ref 10.3–14.5)
SODIUM SERPL-SCNC: 142 MMOL/L — SIGNIFICANT CHANGE UP (ref 135–145)
TIBC SERPL-MCNC: 411 UG/DL — SIGNIFICANT CHANGE UP (ref 220–430)
TRANSFERRIN SERPL-MCNC: 317 MG/DL — SIGNIFICANT CHANGE UP (ref 200–360)
TROPONIN I SERPL-MCNC: 0.06 NG/ML — HIGH (ref 0.01–0.04)
UIBC SERPL-MCNC: 394 UG/DL — HIGH (ref 110–370)
VIT B12 SERPL-MCNC: 275 PG/ML — SIGNIFICANT CHANGE UP (ref 232–1245)
WBC # BLD: 7.22 K/UL — SIGNIFICANT CHANGE UP (ref 3.8–10.5)
WBC # FLD AUTO: 7.22 K/UL — SIGNIFICANT CHANGE UP (ref 3.8–10.5)

## 2020-11-28 PROCEDURE — 99233 SBSQ HOSP IP/OBS HIGH 50: CPT | Mod: GC

## 2020-11-28 PROCEDURE — 99223 1ST HOSP IP/OBS HIGH 75: CPT

## 2020-11-28 RX ORDER — ERYTHROPOIETIN 10000 [IU]/ML
10000 INJECTION, SOLUTION INTRAVENOUS; SUBCUTANEOUS ONCE
Refills: 0 | Status: COMPLETED | OUTPATIENT
Start: 2020-11-28 | End: 2020-11-29

## 2020-11-28 RX ORDER — SODIUM CHLORIDE 9 MG/ML
1000 INJECTION, SOLUTION INTRAVENOUS
Refills: 0 | Status: DISCONTINUED | OUTPATIENT
Start: 2020-11-28 | End: 2020-11-30

## 2020-11-28 RX ORDER — LABETALOL HCL 100 MG
100 TABLET ORAL
Refills: 0 | Status: DISCONTINUED | OUTPATIENT
Start: 2020-11-29 | End: 2020-11-30

## 2020-11-28 RX ORDER — IRON SUCROSE 20 MG/ML
100 INJECTION, SOLUTION INTRAVENOUS
Refills: 0 | Status: COMPLETED | OUTPATIENT
Start: 2020-11-28 | End: 2020-11-30

## 2020-11-28 RX ORDER — INFLUENZA VIRUS VACCINE 15; 15; 15; 15 UG/.5ML; UG/.5ML; UG/.5ML; UG/.5ML
0.5 SUSPENSION INTRAMUSCULAR ONCE
Refills: 0 | Status: DISCONTINUED | OUTPATIENT
Start: 2020-11-28 | End: 2020-11-30

## 2020-11-28 RX ADMIN — Medication 4 MILLIGRAM(S): at 06:36

## 2020-11-28 RX ADMIN — LAMOTRIGINE 100 MILLIGRAM(S): 25 TABLET, ORALLY DISINTEGRATING ORAL at 11:56

## 2020-11-28 RX ADMIN — SODIUM CHLORIDE 50 MILLILITER(S): 9 INJECTION, SOLUTION INTRAVENOUS at 21:42

## 2020-11-28 RX ADMIN — Medication 4 MILLIGRAM(S): at 18:31

## 2020-11-28 RX ADMIN — Medication 300 MILLIGRAM(S): at 06:36

## 2020-11-28 RX ADMIN — Medication 15 MILLIGRAM(S): at 18:31

## 2020-11-28 RX ADMIN — Medication 1: at 11:56

## 2020-11-28 RX ADMIN — MIRTAZAPINE 30 MILLIGRAM(S): 45 TABLET, ORALLY DISINTEGRATING ORAL at 21:42

## 2020-11-28 RX ADMIN — Medication 15 MILLIGRAM(S): at 11:57

## 2020-11-28 RX ADMIN — IRON SUCROSE 100 MILLIGRAM(S): 20 INJECTION, SOLUTION INTRAVENOUS at 12:33

## 2020-11-28 RX ADMIN — ATORVASTATIN CALCIUM 40 MILLIGRAM(S): 80 TABLET, FILM COATED ORAL at 21:42

## 2020-11-28 RX ADMIN — Medication 145 MILLIGRAM(S): at 11:54

## 2020-11-28 RX ADMIN — SODIUM CHLORIDE 75 MILLILITER(S): 9 INJECTION INTRAMUSCULAR; INTRAVENOUS; SUBCUTANEOUS at 06:41

## 2020-11-28 RX ADMIN — PANTOPRAZOLE SODIUM 10 MG/HR: 20 TABLET, DELAYED RELEASE ORAL at 06:39

## 2020-11-28 RX ADMIN — PANTOPRAZOLE SODIUM 10 MG/HR: 20 TABLET, DELAYED RELEASE ORAL at 21:43

## 2020-11-28 RX ADMIN — Medication 25 MILLIGRAM(S): at 22:46

## 2020-11-28 RX ADMIN — AMLODIPINE BESYLATE 10 MILLIGRAM(S): 2.5 TABLET ORAL at 06:35

## 2020-11-28 RX ADMIN — Medication 5000 UNIT(S): at 11:54

## 2020-11-28 RX ADMIN — Medication 15 MILLIGRAM(S): at 06:35

## 2020-11-28 RX ADMIN — Medication 150 MILLIGRAM(S): at 11:56

## 2020-11-28 NOTE — CONSULT NOTE ADULT - ASSESSMENT
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 Spoke to PMD Dr. Man on admission  Patient stopped following up with old cardiologist so plavix and digoxin were just continued. Cardiology consult called for Elevated troponins     Elevated troponins/ chest pain  - Abnormal cardiac biomarkers  not consistent with ACS, more likely from demand   - Trop peak @ 0.77 now down trending no need to trend further  - check ECG  - chest pain does not seem cardiac in nature     CAD S/P stent  -Stable  -plavix held for acute anemia  - CW BB/ACE  -CW Statin   -pt does not follow with cardiologist d/t practice moving and has not seeked out new cards  - pt is unclear on last time any cardiac testing has taken place    Cardiac Murmur  -pt has a murmur that he was unaware of  -does not know why he is on digoxin and states "it just showed up a few weeks ago"  -agree on holding dig right now  -F/u echo to assess for structural cardiac disease.   -denies hx of arrythmia or heart failure but he does not seem like a reliable historian     HTN    - Controlled  - CW amlodipine, labetolol  - monitor routine hemodynamics.    Gi bleed  -S/P PRBC x2  -follow [facility] recs  -per primary  - Would transfuse to keep Hb>9 and watch volume status, given cardiac stents      HLD  - CW statin, fenofibrate    -Monitor and replete lytes, keep K>4 and Mg >2  - Further cardiac workup will depend on clinical course.   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 Spoke to PMD Dr. Man on admission  Patient stopped following up with old cardiologist so plavix and digoxin were just continued. Cardiology consult called for Elevated troponins     Elevated troponins/ chest pain  - Abnormal cardiac biomarkers  not consistent with ACS, more likely from demand   - Trop peak @ 0.77 now down trending no need to trend further  -Ecg @ 54 w/ Incomplete RBBB ST depressions V3-V6  -will need eventual outpt ischemic eval, when acute bleed has subsided      CAD S/P stent  -Stable  -plavix held for acute anemia  - CW BB/ACE  -CW Statin   -pt does not follow with cardiologist d/t practice moving and has not seeked out new cards  - pt is unclear on last time any cardiac testing has taken place    Cardiac Murmur  -pt has a murmur that he was unaware of  -does not know why he is on digoxin and states "it just showed up a few weeks ago"  -agree on holding dig right now  -F/u echo to assess for structural cardiac disease.   -denies hx of arrythmia or heart failure but he does not seem like a reliable historian     HTN    - Controlled  - CW amlodipine, labetolol  - monitor routine hemodynamics.    Gi bleed  -S/P PRBC x2  -follow [facility]EL recs  -per primary  - Would transfuse to keep Hb>9 and watch volume status, given cardiac stents      HLD  - CW statin, fenofibrate    -Monitor and replete lytes, keep K>4 and Mg >2  - Further cardiac workup will depend on clinical course.   - All other workup per primary team  Thank you for the consult  Will continue to follow  Victorino Loyola DNP, ANP-c  Cardiology   Spectra #[id number] [phone]

## 2020-11-28 NOTE — CONSULT NOTE ADULT - SUBJECTIVE AND OBJECTIVE BOX
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 Patient describes the blood as dark red and occurs 2-3 times per day. Blood is not associated with bowel movements or mixed in stool, patient says bleeding just happens randomly. Denies n/v, abdominal pain or cramping. Endorses decreased appetite for past 1-2 days and some weight loss. Denies trauma or inciting event. Has been taking meloxicam prescribed by Psych NP Domingo Ventura. Patient last saw his PMD Dr. Man about 4 weeks ago. Of note, patient states for past 3 days he has had right side chest pain described as sharp, and today had left side chest pain around his shoulder described as dull. No precipitating factors identified, pain occurred at rest. Denies fever, SOB, dysuria, weakness. Spoke to PMD Dr. Man on admission - he states patient has history of malignant HTN, no known CHF or atrial fibrillation. Patient stopped following up with old cardiologist so plavix and digoxin were just continued. His last bloodwork for the patient revealed an BUN/Cr of 25/1.6 and H/H of 11.5/35. States that the meloxicam was prescribed from his end months ago and saw voltaren gel on his list. Known iron deficiency anemia and he was recommended for a colonoscopy per PMD, but patient does not recall this recommendation. Per patient, his last colonoscopy was 5 years ago and was "clean" but has known diverticulosis. No other complaints on admission.    In the ED,  Vital Signs T(F): 98.4 HR: 58 BP: 176/84 RR: 16 SpO2: 99%   EKG: sinus bradycardia HR 54 with 1st degree AV block. Incomplete RBBB. LVH. TWI V3-V6.  Labs: H/H 6.7/21, PT/PTT/INR normal, Bun/Cr 32/2.50, FOBT positive  CXR: no acute infiltrate or effusion on personal read  CT a/p: Extensive sigmoid diverticulosis without diverticulitis.  Received: 2U PRBCs, Protonix 40mg IV, DTaP vaccine (27 Nov 2020 20:21)      PAST MEDICAL & SURGICAL HISTORY:  Hyperlipidemia    Diverticulosis    CAD (coronary artery disease)  s/p stents    Anxiety and depression    Hepatitis C    Lupus    Diabetes mellitus    Hypertension    H/O hernia repair    S/P cataract surgery  Bilateral    Pilonidal cyst  Had surgery ( 1969 )    Torsion of testicle  Had surgery at age 14    S/P arthroscopic knee surgery  Bilateral ( 2005 )    S/P tonsillectomy    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  Had surgery ( April 2013 )        SOCIAL HISTORY:  No tobacco, ethanol, or drug abuse.    FAMILY HISTORY:  No family history of colorectal cancer    FHx: throat cancer      No family history of acute MI or sudden cardiac death.    MEDICATIONS  (STANDING):  amLODIPine   Tablet 10 milliGRAM(s) Oral daily  atorvastatin 40 milliGRAM(s) Oral at bedtime  busPIRone 15 milliGRAM(s) Oral two times a day  cholecalciferol 5000 Unit(s) Oral daily  dextrose 40% Gel 15 Gram(s) Oral once  dextrose 5%. 1000 milliLiter(s) (50 mL/Hr) IV Continuous <Continuous>  dextrose 5%. 1000 milliLiter(s) (100 mL/Hr) IV Continuous <Continuous>  dextrose 50% Injectable 25 Gram(s) IV Push once  dextrose 50% Injectable 12.5 Gram(s) IV Push once  dextrose 50% Injectable 25 Gram(s) IV Push once  doxazosin 4 milliGRAM(s) Oral <User Schedule>  doxepin 25 milliGRAM(s) Oral at bedtime  epoetin michael-epbx (RETACRIT) Injectable 59069 Unit(s) SubCutaneous once  fenofibrate Tablet 145 milliGRAM(s) Oral daily  glucagon  Injectable 1 milliGRAM(s) IntraMuscular once  insulin lispro (ADMELOG) corrective regimen sliding scale   SubCutaneous every 6 hours  iron sucrose Injectable 100 milliGRAM(s) IV Push <User Schedule>  labetalol 300 milliGRAM(s) Oral two times a day  lamoTRIgine 100 milliGRAM(s) Oral daily  mirtazapine 30 milliGRAM(s) Oral at bedtime  oxybutynin 15 milliGRAM(s) Oral daily  pantoprazole Infusion 8 mG/Hr (10 mL/Hr) IV Continuous <Continuous>  sodium chloride 0.45%. 1000 milliLiter(s) (50 mL/Hr) IV Continuous <Continuous>  venlafaxine XR. 150 milliGRAM(s) Oral daily    MEDICATIONS  (PRN):      Allergies    No Known Allergies    Intolerances        REVIEW OF SYSTEMS:    CONSTITUTIONAL: No weakness, fevers or chills  EYES/ENT: No visual changes;  No vertigo or throat pain   NECK: No pain or stiffness  RESPIRATORY: No cough, wheezing, hemoptysis; No shortness of breath  CARDIOVASCULAR: No palpitations, + chest pain   GASTROINTESTINAL: No abdominal pain. No nausea, vomiting, or hematemesis; No diarrhea or constipation. No melena or hematochezia.  GENITOURINARY: No dysuria, frequency or hematuria  NEUROLOGICAL: No numbness or weakness  SKIN: No itching or rash  All other review of systems is negative unless indicated above    VITAL SIGNS:   Vital Signs Last 24 Hrs  T(C): 36.8 (28 Nov 2020 07:14), Max: 37.2 (28 Nov 2020 02:00)  T(F): 98.2 (28 Nov 2020 07:14), Max: 98.9 (28 Nov 2020 02:00)  HR: 50 (28 Nov 2020 12:04) (48 - 64)  BP: 145/75 (28 Nov 2020 12:04) (145/75 - 191/83)  BP(mean): --  RR: 17 (28 Nov 2020 12:04) (16 - 18)  SpO2: 96% (28 Nov 2020 12:04) (96% - 100%)    I&O's Summary      On Exam:  Tele: SB  Constitutional: NAD, alert and oriented x 3  Lungs:  Non-labored, breath sounds are clear bilaterally, No wheezing, rales or rhonchi  Cardiovascular: RRR.  S1 and S2 positive.  + murmur No rubs, gallops or clicks   Gastrointestinal: Bowel Sounds present, soft, nontender.   Lymph: No peripheral edema. No cervical lymphadenopathy.  Neurological: Alert, no focal deficits  Skin: No rashes or ulcers   Psych:  Mood & affect appropriate.    LABS: All Labs Reviewed:                        8.0    x     )-----------( x        ( 28 Nov 2020 12:44 )             24.8                         8.2    7.22  )-----------( 271      ( 28 Nov 2020 05:52 )             25.3                         6.7    5.77  )-----------( 289      ( 27 Nov 2020 17:43 )             21.0     28 Nov 2020 05:52    142    |  109    |  25     ----------------------------<  119    3.5     |  25     |  2.30   27 Nov 2020 17:43    142    |  108    |  32     ----------------------------<  113    3.8     |  25     |  2.50     Ca    8.9        28 Nov 2020 05:52  Ca    8.8        27 Nov 2020 17:43  Phos  3.0       28 Nov 2020 05:52  Mg     1.9       28 Nov 2020 05:52    TPro  6.5    /  Alb  3.4    /  TBili  0.5    /  DBili  x      /  AST  22     /  ALT  22     /  AlkPhos  31     28 Nov 2020 05:52  TPro  6.8    /  Alb  3.4    /  TBili  0.3    /  DBili  x      /  AST  18     /  ALT  21     /  AlkPhos  30     27 Nov 2020 17:43    PT/INR - ( 28 Nov 2020 05:52 )   PT: 13.3 sec;   INR: 1.14 ratio         PTT - ( 28 Nov 2020 05:52 )  PTT:32.0 sec  CARDIAC MARKERS ( 28 Nov 2020 05:52 )  .061 ng/mL / x     / 77 U/L / x     / 1.8 ng/mL  CARDIAC MARKERS ( 27 Nov 2020 22:08 )  .077 ng/mL / x     / 73 U/L / x     / 1.8 ng/mL      Blood Culture:         RADIOLOGY:  ad< from: Xray Chest 1 View- PORTABLE-Urgent (Xray Chest 1 View- PORTABLE-Urgent .) (11.27.20 @ 17:37) >  EXAM:  XR CHEST PORTABLE URGENT 1V                            PROCEDURE DATE:  11/27/2020          INTERPRETATION:  Exam:XR CHEST URGENT    clinical history:rectal bleed    Heart size is normal. Lungs show no acute infiltrate. No pleural effusion.    IMPRESSION:  No active parenchymal disease in the chest.                ARASH VASQUES MD; Attending Radiologist  This document has been electronically signed. Nov 28 2020  8:27AM    < end of copied text >  < from: CT Abdomen and Pelvis No Cont (11.27.20 @ 19:42) >  EXAM:  CT ABDOMEN AND PELVIS                            PROCEDURE DATE:  11/27/2020          INTERPRETATION:  CLINICAL INFORMATION: Abdominal pain, rectal bleeding    COMPARISON: None.    PROCEDURE:  CT of the Abdomen and Pelvis was performed without intravenous contrast.  Intravenous contrast: None.  Oral contrast: None.  Sagittal and coronal reformats were performed.    FINDINGS:  LOWER CHEST: Cardiomegaly. Coronary artery calcifications. Cardiac septum is dense relative to the chambers consistent with anemia.    LIVER: Within normal limits.  BILE DUCTS: Normal caliber.  GALLBLADDER: Within normal limits.  SPLEEN: Within normal limits.  PANCREAS: Within normal limits.  ADRENALS: Within normal limits.  KIDNEYS/URETERS: Right renal cysts poorly evaluated on this noncontrast examination    BLADDER: Within normal limits.  REPRODUCTIVE ORGANS: Prostate within normal limits.    BOWEL: Extensive sigmoid diverticulosis without diverticulitis. No bowel obstruction. Appendix is normal.  PERITONEUM: No ascites.  VESSELS: Atherosclerotic changes.  RETROPERITONEUM/LYMPH NODES: No lymphadenopathy.  ABDOMINAL WALL: Within normal limits.  BONES: Degenerative changes.    IMPRESSION:    Extensive sigmoid diverticulosis without diverticulitis.              JOHN YOUSSEF MD; Attending Radiologist  This document has been electronically signed. Nov 27 2020  7:46PM    < end of copied text >    EKG:  c< from: 12 Lead ECG (01.24.16 @ 22:16) >  Ventricular Rate 59 BPM    Atrial Rate 59 BPM    P-R Interval 272 ms    QRS Duration 118 ms    Q-T Interval 462 ms    QTC Calculation(Bezet) 457 ms    P Axis 15 degrees    R Axis -14 degrees    T Axis 22 degrees    Diagnosis Line Sinus bradycardia with 1st degree AV block  Otherwise normal ECG  No previous ECGs available  Confirmed by SHAUN TAET ([id number]) on 1/25/2016 9:09:14 PM    < end of copied text >    TTE:      
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 Patient describes the blood as dark red and occurs 2-3 times per day. Blood is not associated with bowel movements or mixed in stool, patient says bleeding just happens randomly. Denies n/v, abdominal pain or cramping. Endorses decreased appetite for past 1-2 days and some weight loss. Denies trauma or inciting event. Has been taking meloxicam prescribed by Psych NP Domingo Ventura. Patient last saw his PMD Dr. Man about 4 weeks ago. Of note, patient states for past 3 days he has had right side chest pain described as sharp, and today had left side chest pain around his shoulder described as dull. No precipitating factors identified, pain occurred at rest. Denies fever, SOB, dysuria, weakness. Spoke to PMD Dr. Man on admission - he states patient has history of malignant HTN, no known CHF or atrial fibrillation. Patient stopped following up with old cardiologist so plavix and digoxin were just continued. His last bloodwork for the patient revealed an BUN/Cr of 25/1.6 and H/H of 11.5/35. States that the meloxicam was prescribed from his end months ago and saw voltaren gel on his list. Known iron deficiency anemia and he was recommended for a colonoscopy per PMD, but patient does not recall this recommendation. Per patient, his last colonoscopy was 5 years ago and was "clean" but has known diverticulosis. No other complaints on admission.    Allergies:  No Known Allergies      Medications:  amLODIPine   Tablet 10 milliGRAM(s) Oral daily  atorvastatin 40 milliGRAM(s) Oral at bedtime  busPIRone 15 milliGRAM(s) Oral two times a day  cholecalciferol 5000 Unit(s) Oral daily  dextrose 40% Gel 15 Gram(s) Oral once  dextrose 5%. 1000 milliLiter(s) IV Continuous <Continuous>  dextrose 5%. 1000 milliLiter(s) IV Continuous <Continuous>  dextrose 50% Injectable 25 Gram(s) IV Push once  dextrose 50% Injectable 12.5 Gram(s) IV Push once  dextrose 50% Injectable 25 Gram(s) IV Push once  doxazosin 4 milliGRAM(s) Oral <User Schedule>  doxepin 25 milliGRAM(s) Oral at bedtime  fenofibrate Tablet 145 milliGRAM(s) Oral daily  glucagon  Injectable 1 milliGRAM(s) IntraMuscular once  insulin lispro (ADMELOG) corrective regimen sliding scale   SubCutaneous every 6 hours  labetalol 300 milliGRAM(s) Oral two times a day  lamoTRIgine 100 milliGRAM(s) Oral daily  mirtazapine 30 milliGRAM(s) Oral at bedtime  oxybutynin 15 milliGRAM(s) Oral daily  pantoprazole Infusion 8 mG/Hr IV Continuous <Continuous>  sodium chloride 0.9%. 1000 milliLiter(s) IV Continuous <Continuous>  venlafaxine XR. 150 milliGRAM(s) Oral daily      PMHX/PSHX:  Hyperlipidemia    Diverticulosis    CAD (coronary artery disease)    Anxiety and depression    Hepatitis C    Lupus    Diabetes mellitus    Hypertension    H/O hernia repair    S/P cataract surgery    Pilonidal cyst    Torsion of testicle    S/P arthroscopic knee surgery    S/P tonsillectomy    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        Family history:  No family history of colorectal cancer    FHx: throat cancer    No pertinent family history in first degree relatives        Social History:     ROS:     General:  No wt loss, fevers, chills, night sweats, fatigue,   Eyes:  Good vision, no reported pain  ENT:  No sore throat, pain, runny nose, dysphagia  CV:  No pain, palpitations, hypo/hypertension  Resp:  No dyspnea, cough, tachypnea, wheezing  GI:  No pain, No nausea, No vomiting, No diarrhea, No constipation, No weight loss, No fever, No pruritis, + rectal bleeding, No tarry stools, No dysphagia,  :  No pain, bleeding, incontinence, nocturia  Muscle:  No pain, weakness  Neuro:  No weakness, tingling, memory problems  Psych:  No fatigue, insomnia, mood problems, depression  Endocrine:  No polyuria, polydipsia, cold/heat intolerance  Heme:  No petechiae, ecchymosis, easy bruisability  Skin:  No rash, tattoos, scars, edema      PHYSICAL EXAM:   Vital Signs:  Vital Signs Last 24 Hrs  T(C): 36.8 (28 Nov 2020 07:14), Max: 37.2 (28 Nov 2020 02:00)  T(F): 98.2 (28 Nov 2020 07:14), Max: 98.9 (28 Nov 2020 02:00)  HR: 48 (28 Nov 2020 07:14) (48 - 64)  BP: 158/78 (28 Nov 2020 07:14) (154/74 - 191/83)  BP(mean): --  RR: 16 (28 Nov 2020 07:14) (16 - 18)  SpO2: 97% (28 Nov 2020 07:14) (97% - 100%)  Daily Height in cm: 172.72 (27 Nov 2020 17:08)    Daily     GENERAL:  Appears stated age, well-groomed, well-nourished, no distress  HEENT:  NC/AT,  conjunctivae clear and pink, no thyromegaly, nodules, adenopathy, no JVD, sclera -anicteric  CHEST:  Full & symmetric excursion, no increased effort, breath sounds clear  HEART:  Regular rhythm, S1, S2, no murmur/rub/S3/S4, no abdominal bruit, no edema  ABDOMEN:  Soft, non-tender, non-distended, normoactive bowel sounds,  no masses ,no hepato-splenomegaly, no signs of chronic liver disease  EXTEREMITIES:  no cyanosis,clubbing or edema  SKIN:  No rash/erythema/ecchymoses/petechiae/wounds/abscess/warm/dry  NEURO:  Alert, oriented, no asterixis, no tremor, no encephalopathy    LABS:                        8.2    7.22  )-----------( 271      ( 28 Nov 2020 05:52 )             25.3     11-28    142  |  109<H>  |  25<H>  ----------------------------<  119<H>  3.5   |  25  |  2.30<H>    Ca    8.9      28 Nov 2020 05:52  Phos  3.0     11-28  Mg     1.9     11-28    TPro  6.5  /  Alb  3.4  /  TBili  0.5  /  DBili  x   /  AST  22  /  ALT  22  /  AlkPhos  31<L>  11-28    LIVER FUNCTIONS - ( 28 Nov 2020 05:52 )  Alb: 3.4 g/dL / Pro: 6.5 g/dL / ALK PHOS: 31 U/L / ALT: 22 U/L / AST: 22 U/L / GGT: x           PT/INR - ( 28 Nov 2020 05:52 )   PT: 13.3 sec;   INR: 1.14 ratio         PTT - ( 28 Nov 2020 05:52 )  PTT:32.0 sec        Imaging:          
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   Patient describes the blood as dark red and occurs 2-3 times per day. Blood is not associated with bowel movements or mixed in stool, patient says bleeding just happens randomly. Denies n/v, abdominal pain or cramping. Endorses decreased appetite for past 1-2 days and some weight loss. Denies trauma or inciting event. Has been taking meloxicam prescribed by Psych NP Domingo Ventura.   Patient last saw his PMD Dr. Man about 4 weeks ago. Of note, patient states for past 3 days he has had right side chest pain described as sharp, and today had left side chest pain around his shoulder described as dull. No precipitating factors identified, pain occurred at rest. Denies fever, SOB, dysuria, weakness. Spoke to PMD Dr. Man on admission - he states patient has history of malignant HTN, no known CHF or atrial fibrillation. Patient stopped following up with old cardiologist so plavix and digoxin were just continued. His last bloodwork for the patient revealed an BUN/Cr of 25/1.6 and H/H of 11.5/35. States that the meloxicam was prescribed from his end months ago and saw voltaren gel on his list. Known iron deficiency anemia and he was recommended for a colonoscopy per PMD, but patient does not recall this recommendation. Per patient, his last colonoscopy was 5 years ago and was "clean" but has known diverticulosis. No other complaints on admission.    In the ED,  Vital Signs T(F): 98.4 HR: 58 BP: 176/84 RR: 16 SpO2: 99%   EKG: sinus bradycardia HR 54 with 1st degree AV block. Incomplete RBBB. LVH. TWI V3-V6.  Labs: H/H 6.7/21, PT/PTT/INR normal, Bun/Cr 32/2.50, FOBT positive  CXR: no acute infiltrate or effusion on personal read  CT a/p: Extensive sigmoid diverticulosis without diverticulitis.  Received: 2U PRBCs, Protonix 40mg IV, DTaP vaccine (27 Nov 2020 20:21)    Renal consult called for ARIELLA. History obtained from chart and patient.      PAST MEDICAL HISTORY:  Hyperlipidemia    Diverticulosis    CAD (coronary artery disease)    Anxiety and depression    Hepatitis C    Lupus    Diabetes mellitus    Hypertension        PAST SURGICAL HISTORY:  H/O hernia repair    S/P cataract surgery    Pilonidal cyst    Torsion of testicle    S/P arthroscopic knee surgery    S/P tonsillectomy    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        FAMILY HISTORY:  No family history of colorectal cancer    FHx: throat cancer        SOCIAL HISTORY:    Allergies    No Known Allergies    Intolerances      Home Medications:  AMLODIPINE 10MG TAB: TAKE ONE TABLET DAILY (FOR BLOOD PRESSURE) (27 Nov 2020 22:15)  ARISTADA     INJ 1064MG: INJECT 3.9ML INTRAMUSCULARLY AS DIRECTED (27 Nov 2020 22:15)  BUSPIRONE 15MG TAB: TAKE 1 TABLET TWICE A DAY (27 Nov 2020 22:15)  CLOPIDOGREL  TAB 75MG: TAKE ONE TABLET DAILY (BLOOD THINNER) (27 Nov 2020 22:15)  digoxin 250 mcg (0.25 mg) oral tablet: 1 tab(s) orally once a day (27 Nov 2020 22:15)  DOXAZOSIN 4MG TAB: TAKE 1 TABLET TWICE A DAY (FOR PROSTATE) (27 Nov 2020 22:15)  DOXEPIN 25MG CAP: TAKE ONE CAPSULE AT BEDTIME (27 Nov 2020 22:15)  FENOFIBRATE  TAB 145MG: TAKE ONE TABLET DAILY (FOR CHOLESTEROL) (27 Nov 2020 22:15)  HYDROCHLOROT 12.5MG CAP: TAKE 1 CAPSULE DAILY (WATER PILL) (27 Nov 2020 22:15)  LABETALOL 300MG TAB: TAKE 1 TABLET TWICE A DAY (FOR BLOOD PRESSURE HEART) (27 Nov 2020 22:15)  LAMOTRIGINE 100MG TA: TAKE ONE TABLET DAILY (27 Nov 2020 22:15)  MELOXICAM 7.5MG TAB: TAKE 1 TABLET TWICE A DAY AS NEEDED FOR PAIN (27 Nov 2020 22:15)  METFORMIN 500MG TAB: TAKE 1 TABLET TWICE A DAY (FOR DIABETES) (27 Nov 2020 22:15)  MIRTAZAPINE  TAB 30MG: TAKE 1 TABLET AT BEDTIME (27 Nov 2020 22:15)  OMEPRAZOLE 40MG CAP: TAKE 1 CAPSULE DAILY (FOR STOMACH) (27 Nov 2020 22:15)  OXYBUTYNIN ER 15MG TAB: TAKE ONE TABLET DAILY (27 Nov 2020 22:15)  ROSUVASTATIN TAB 10MG: TAKE ONE TABLET DAILY (FOR CHOLESTEROL) (27 Nov 2020 22:15)  VASCEPA 1GM CAP: TAKE 1 CAPSULE TWICE A DAY (27 Nov 2020 22:15)  VENLAFAXINE XR 150MG CAPS: TAKE 1 CAPSULE DAILY (27 Nov 2020 22:15)  VITAMIN D 1000UNIT (M) TAB: TAKE 5 TABLETS DAILY (27 Nov 2020 22:15)    MEDICATIONS  (STANDING):  amLODIPine   Tablet 10 milliGRAM(s) Oral daily  atorvastatin 40 milliGRAM(s) Oral at bedtime  busPIRone 15 milliGRAM(s) Oral two times a day  cholecalciferol 5000 Unit(s) Oral daily  dextrose 40% Gel 15 Gram(s) Oral once  dextrose 5%. 1000 milliLiter(s) (50 mL/Hr) IV Continuous <Continuous>  dextrose 5%. 1000 milliLiter(s) (100 mL/Hr) IV Continuous <Continuous>  dextrose 50% Injectable 25 Gram(s) IV Push once  dextrose 50% Injectable 12.5 Gram(s) IV Push once  dextrose 50% Injectable 25 Gram(s) IV Push once  doxazosin 4 milliGRAM(s) Oral <User Schedule>  doxepin 25 milliGRAM(s) Oral at bedtime  fenofibrate Tablet 145 milliGRAM(s) Oral daily  glucagon  Injectable 1 milliGRAM(s) IntraMuscular once  insulin lispro (ADMELOG) corrective regimen sliding scale   SubCutaneous every 6 hours  labetalol 300 milliGRAM(s) Oral two times a day  lamoTRIgine 100 milliGRAM(s) Oral daily  mirtazapine 30 milliGRAM(s) Oral at bedtime  oxybutynin 15 milliGRAM(s) Oral daily  pantoprazole Infusion 8 mG/Hr (10 mL/Hr) IV Continuous <Continuous>  sodium chloride 0.9%. 1000 milliLiter(s) (75 mL/Hr) IV Continuous <Continuous>  venlafaxine XR. 150 milliGRAM(s) Oral daily    MEDICATIONS  (PRN):      REVIEW OF SYSTEMS:  General: no distress  Respiratory: No cough, SOB  Cardiovascular: No CP or Palpitations	  Gastrointestinal: No nausea, Vomiting. No diarrhea  Genitourinary: No urinary complaints	  Musculoskeletal: No new rash or lesions	  all other systems negative    T(F): 98.2 (11-28-20 @ 07:14), Max: 98.9 (11-28-20 @ 02:00)  HR: 48 (11-28-20 @ 07:14) (48 - 64)  BP: 158/78 (11-28-20 @ 07:14) (154/74 - 191/83)  RR: 16 (11-28-20 @ 07:14) (16 - 18)  SpO2: 97% (11-28-20 @ 07:14) (97% - 100%)  Wt(kg): --    PHYSICAL EXAM:  General: NAD  Respiratory: b/l air entry  Cardiovascular: S1 S2  Gastrointestinal: soft  Extremities: edema        11-28    142  |  109<H>  |  25<H>  ----------------------------<  119<H>  3.5   |  25  |  2.30<H>    Ca    8.9      28 Nov 2020 05:52  Phos  3.0     11-28  Mg     1.9     11-28    TPro  6.5  /  Alb  3.4  /  TBili  0.5  /  DBili  x   /  AST  22  /  ALT  22  /  AlkPhos  31<L>  11-28                          8.2    7.22  )-----------( 271      ( 28 Nov 2020 05:52 )             25.3       Potassium, Serum: 3.5 mmol/L (11-28 @ 05:52)  Blood Urea Nitrogen, Serum: 25 mg/dL (11-28 @ 05:52)  Calcium, Total Serum: 8.9 mg/dL (11-28 @ 05:52)  Hemoglobin: 8.2 g/dL (11-28 @ 05:52)      Creatinine, Serum: 2.30 (11-28 @ 05:52)  Creatinine, Serum: 2.50 (11-27 @ 17:43)        LIVER FUNCTIONS - ( 28 Nov 2020 05:52 )  Alb: 3.4 g/dL / Pro: 6.5 g/dL / ALK PHOS: 31 U/L / ALT: 22 U/L / AST: 22 U/L / GGT: x           CARDIAC MARKERS ( 28 Nov 2020 05:52 )  .061 ng/mL / x     / 77 U/L / x     / 1.8 ng/mL  CARDIAC MARKERS ( 27 Nov 2020 22:08 )  .077 ng/mL / x     / 73 U/L / x     / 1.8 ng/mL      Creatine Kinase, Serum: 77 U/L (11-28-20 @ 05:52)  Creatine Kinase, Serum: 73 U/L (11-27-20 @ 22:08)    < from: CT Abdomen and Pelvis No Cont (11.27.20 @ 19:42) >    EXAM:  CT ABDOMEN AND PELVIS                            PROCEDURE DATE:  11/27/2020          INTERPRETATION:  CLINICAL INFORMATION: Abdominal pain, rectal bleeding    COMPARISON: None.    PROCEDURE:  CT of the Abdomen and Pelvis was performed without intravenous contrast.  Intravenous contrast: None.  Oral contrast: None.  Sagittal and coronal reformats were performed.    FINDINGS:  LOWER CHEST: Cardiomegaly. Coronary artery calcifications. Cardiac septum is dense relative to the chambers consistent with anemia.    LIVER: Within normal limits.  BILE DUCTS: Normal caliber.  GALLBLADDER: Within normal limits.  SPLEEN: Within normal limits.  PANCREAS: Within normal limits.  ADRENALS: Within normal limits.  KIDNEYS/URETERS: Right renal cysts poorly evaluated on this noncontrast examination    BLADDER: Within normal limits.  REPRODUCTIVE ORGANS: Prostate within normal limits.    BOWEL: Extensive sigmoid diverticulosis without diverticulitis. No bowel obstruction. Appendix is normal.  PERITONEUM: No ascites.  VESSELS: Atherosclerotic changes.  RETROPERITONEUM/LYMPH NODES: No lymphadenopathy.  ABDOMINAL WALL: Within normal limits.  BONES: Degenerative changes.    IMPRESSION:    Extensive sigmoid diverticulosis without diverticulitis.        JOHN YOUSSEF MD; Attending Radiologist  This document has been electronically signed. Nov 27 2020  7:46PM    < end of copied text >

## 2020-11-28 NOTE — PROGRESS NOTE ADULT - SUBJECTIVE AND OBJECTIVE BOX
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 Patient describes the blood as dark red and occurs 2-3 times per day. Blood is not associated with bowel movements or mixed in stool, patient says bleeding just happens randomly. Denies n/v, abdominal pain or cramping. Endorses decreased appetite for past 1-2 days and some weight loss. Denies trauma or inciting event. Has been taking meloxicam prescribed by Psych NP Domingo Ventura. Patient last saw his PMD Dr. Man about 4 weeks ago. Of note, patient states for past 3 days he has had right side chest pain described as sharp, and today had left side chest pain around his shoulder described as dull. No precipitating factors identified, pain occurred at rest. Denies fever, SOB, dysuria, weakness. Spoke to PMD Dr. Man on admission - he states patient has history of malignant HTN, no known CHF or atrial fibrillation. Patient stopped following up with old cardiologist so plavix and digoxin were just continued. His last bloodwork for the patient revealed an BUN/Cr of 25/1.6 and H/H of 11.5/35. States that the meloxicam was prescribed from his end months ago and saw voltaren gel on his list. Known iron deficiency anemia and he was recommended for a colonoscopy per PMD, but patient does not recall this recommendation. Per patient, his last colonoscopy was 5 years ago and was "clean" but has known diverticulosis. No other complaints on admission.    In the ED,  Vital Signs T(F): 98.4 HR: 58 BP: 176/84 RR: 16 SpO2: 99%   EKG: sinus bradycardia HR 54 with 1st degree AV block. Incomplete RBBB. LVH. TWI V3-V6.  Labs: H/H 6.7/21, PT/PTT/INR normal, Bun/Cr 32/2.50, FOBT positive  CXR: no acute infiltrate or effusion on personal read  CT a/p: Extensive sigmoid diverticulosis without diverticulitis.  Received: 2U PRBCs, Protonix 40mg IV, DTaP vaccine (27 Nov 2020 20:21)      ----  INTERVAL HPI/OVERNIGHT EVENTS: Pt seen and evaluated at the bedside. No acute overnight events occurred. Feeling well today. Has not had a BM since admission, but states that blood in stool had resolved prior to coming hospital. Was able to tolerate jello today.     ----  PAST MEDICAL & SURGICAL HISTORY:  Hyperlipidemia    Diverticulosis    CAD (coronary artery disease)  s/p stents    Anxiety and depression    Hepatitis C    Lupus    Diabetes mellitus    Hypertension    H/O hernia repair    S/P cataract surgery  Bilateral    Pilonidal cyst  Had surgery ( 1969 )    Torsion of testicle  Had surgery at age 14    S/P arthroscopic knee surgery  Bilateral ( 2005 )    S/P tonsillectomy    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  Had surgery ( April 2013 )        FAMILY HISTORY:  No family history of colorectal cancer    FHx: throat cancer        Allergies    No Known Allergies    Intolerances        ----  REVIEW OF SYSTEMS:  CONSTITUTIONAL: denies fever, chills, fatigue, weakness  HEENT: denies blurred vision, sore throat  SKIN: denies new lesions, rash  CARDIOVASCULAR: denies chest pain, chest pressure, palpitations  RESPIRATORY: denies shortness of breath, sputum production  GASTROINTESTINAL: denies nausea, vomiting, diarrhea, abdominal pain  GENITOURINARY: denies dysuria, discharge  NEUROLOGICAL: denies numbness, headache, focal weakness  MUSCULOSKELETAL: denies new joint pain, muscle aches  HEMATOLOGIC: denies gross bleeding, bruising  LYMPHATICS: denies enlarged lymph nodes, extremity swelling  PSYCHIATRIC: denies recent changes in anxiety, depression  ENDOCRINOLOGIC: denies sweating, cold or heat intolerance    ----  PHYSICAL EXAM:  GENERAL: patient appears well, no acute distress, appropriately interactive  EYES: sclera clear, no exudates  ENMT: oropharynx clear without erythema, moist mucous membranes  NECK: supple, soft, no thyromegaly noted  LUNGS: good air entry bilaterally, clear to auscultation, symmetric breath sounds, no wheezing or rhonchi appreciated  HEART: soft S1/S2, regular rate and rhythm, no murmurs noted, no noted edema to b/l LE  GASTROINTESTINAL: abdomen is soft, nontender, nondistended, normoactive bowel sounds, no palpable masses  INTEGUMENT: good skin turgor, appropriate for ethnicity, appears well perfused, no jaundice noted  MUSCULOSKELETAL: no clubbing or cyanosis, no obvious deformity  NEUROLOGIC: awake, alert, oriented x3, good muscle tone in 4 extremities, no obvious sensory deficits  PSYCHIATRIC: mood is good, affect is congruent with mood, linear and logical thought process  HEME/LYMPH: no palpable supraclavicular nodules, no obvious ecchymosis     T(C): 36.8 (11-28-20 @ 07:14), Max: 37.2 (11-28-20 @ 02:00)  HR: 50 (11-28-20 @ 12:04) (48 - 64)  BP: 145/75 (11-28-20 @ 12:04) (145/75 - 191/83)  RR: 17 (11-28-20 @ 12:04) (16 - 18)  SpO2: 96% (11-28-20 @ 12:04) (96% - 100%)  Wt(kg): --    ----  I&O's Summary      LABS:                        8.0    x     )-----------( x        ( 28 Nov 2020 12:44 )             24.8     11-28    142  |  109<H>  |  25<H>  ----------------------------<  119<H>  3.5   |  25  |  2.30<H>    Ca    8.9      28 Nov 2020 05:52  Phos  3.0     11-28  Mg     1.9     11-28    TPro  6.5  /  Alb  3.4  /  TBili  0.5  /  DBili  x   /  AST  22  /  ALT  22  /  AlkPhos  31<L>  11-28    PT/INR - ( 28 Nov 2020 05:52 )   PT: 13.3 sec;   INR: 1.14 ratio         PTT - ( 28 Nov 2020 05:52 )  PTT:32.0 sec    CAPILLARY BLOOD GLUCOSE      POCT Blood Glucose.: 170 mg/dL (28 Nov 2020 11:46)  POCT Blood Glucose.: 119 mg/dL (28 Nov 2020 06:34)  POCT Blood Glucose.: 118 mg/dL (27 Nov 2020 23:47)                ----  Personally reviewed:  Vital sign trends: [  ] yes    [  ] no     [  ] n/a  Laboratory results: [  ] yes    [  ] no     [  ] n/a  Radiology results: [  ] yes    [  ] no     [  ] n/a  Culture results: [  ] yes    [  ] no     [  ] n/a  Consultant recommendations: [  ] yes    [  ] no     [  ] n/a         Patient is a 75y old  Male who presents with a chief complaint of GI bleed (28 Nov 2020 12:54)      FROM ADMISSION H+P:   HPI:  79 y/o M with PMHx of NIDDM2, CAD s/p stents 4 years ago (on plavix), Lupus, HTN, HepC, HLD, diverticulosis, 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 Patient describes the blood as dark red and occurs 2-3 times per day. Blood is not associated with bowel movements or mixed in stool, patient says bleeding just happens randomly. Denies n/v, abdominal pain or cramping. Endorses decreased appetite for past 1-2 days and some weight loss. Denies trauma or inciting event. Has been taking meloxicam prescribed by Psych NP Domingo Ventura. Patient last saw his PMD Dr. Man about 4 weeks ago. Of note, patient states for past 3 days he has had right side chest pain described as sharp, and today had left side chest pain around his shoulder described as dull. No precipitating factors identified, pain occurred at rest. Denies fever, SOB, dysuria, weakness. Spoke to PMD Dr. Man on admission - he states patient has history of malignant HTN, no known CHF or atrial fibrillation. Patient stopped following up with old cardiologist so plavix and digoxin were just continued. His last bloodwork for the patient revealed an BUN/Cr of 25/1.6 and H/H of 11.5/35. States that the meloxicam was prescribed from his end months ago and saw voltaren gel on his list. Known iron deficiency anemia and he was recommended for a colonoscopy per PMD, but patient does not recall this recommendation. Per patient, his last colonoscopy was 5 years ago and was "clean" but has known diverticulosis. No other complaints on admission.    In the ED,  Vital Signs T(F): 98.4 HR: 58 BP: 176/84 RR: 16 SpO2: 99%   EKG: sinus bradycardia HR 54 with 1st degree AV block. Incomplete RBBB. LVH. TWI V3-V6.  Labs: H/H 6.7/21, PT/PTT/INR normal, Bun/Cr 32/2.50, FOBT positive  CXR: no acute infiltrate or effusion on personal read  CT a/p: Extensive sigmoid diverticulosis without diverticulitis.  Received: 2U PRBCs, Protonix 40mg IV, DTaP vaccine (27 Nov 2020 20:21)      ----  INTERVAL HPI/OVERNIGHT EVENTS: Pt seen and evaluated at the bedside. No acute overnight events occurred. Feeling well today. Has not had a BM since admission, but states that blood in stool had resolved prior to coming hospital. Was able to tolerate jello today. Admits mild abdominal pain. Otherwise, no acute complaints.    ----  PAST MEDICAL & SURGICAL HISTORY:  Hyperlipidemia    Diverticulosis    CAD (coronary artery disease)  s/p stents    Anxiety and depression    Hepatitis C    Lupus    Diabetes mellitus    Hypertension    H/O hernia repair    S/P cataract surgery  Bilateral    Pilonidal cyst  Had surgery ( 1969 )    Torsion of testicle  Had surgery at age 14    S/P arthroscopic knee surgery  Bilateral ( 2005 )    S/P tonsillectomy    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  Had surgery ( April 2013 )        FAMILY HISTORY:  No family history of colorectal cancer    FHx: throat cancer        Allergies    No Known Allergies    Intolerances        ----  REVIEW OF SYSTEMS:  CONSTITUTIONAL: denies fever, chills  CARDIOVASCULAR: denies chest pain, chest pressure, palpitations  RESPIRATORY: denies shortness of breath, sputum production  GASTROINTESTINAL: denies nausea, vomiting, diarrhea, abdominal pain  GENITOURINARY: denies dysuria, discharge  HEMATOLOGIC: denies gross bleeding, bruising  LYMPHATICS: denies enlarged lymph nodes, extremity swelling    ----  PHYSICAL EXAM:  GENERAL: obese patient appears well, no acute distress, appropriately interactive  EYES: sclera clear, no exudates  ENMT: oropharynx clear without erythema, moist mucous membranes  NECK: supple, soft, no thyromegaly noted  LUNGS: good air entry bilaterally, clear to auscultation, symmetric breath sounds, no wheezing or rhonchi appreciated  HEART: soft S1/S2, regular rate and rhythm, systolic murmur noted, no noted edema to b/l LE  GASTROINTESTINAL: abdomen is soft, mild tenderness to palpation in the left lower quadrant, nondistended, normoactive bowel sounds  NEUROLOGIC: awake, alert, oriented x3, good muscle tone in 4 extremities, no obvious sensory deficits  PSYCHIATRIC: mood is good, affect is congruent with mood, linear and logical thought process    T(C): 36.8 (11-28-20 @ 07:14), Max: 37.2 (11-28-20 @ 02:00)  HR: 50 (11-28-20 @ 12:04) (48 - 64)  BP: 145/75 (11-28-20 @ 12:04) (145/75 - 191/83)  RR: 17 (11-28-20 @ 12:04) (16 - 18)  SpO2: 96% (11-28-20 @ 12:04) (96% - 100%)  Wt(kg): --    ----  I&O's Summary      LABS:                        8.0    x     )-----------( x        ( 28 Nov 2020 12:44 )             24.8     11-28    142  |  109<H>  |  25<H>  ----------------------------<  119<H>  3.5   |  25  |  2.30<H>    Ca    8.9      28 Nov 2020 05:52  Phos  3.0     11-28  Mg     1.9     11-28    TPro  6.5  /  Alb  3.4  /  TBili  0.5  /  DBili  x   /  AST  22  /  ALT  22  /  AlkPhos  31<L>  11-28    PT/INR - ( 28 Nov 2020 05:52 )   PT: 13.3 sec;   INR: 1.14 ratio         PTT - ( 28 Nov 2020 05:52 )  PTT:32.0 sec    CAPILLARY BLOOD GLUCOSE      POCT Blood Glucose.: 170 mg/dL (28 Nov 2020 11:46)  POCT Blood Glucose.: 119 mg/dL (28 Nov 2020 06:34)  POCT Blood Glucose.: 118 mg/dL (27 Nov 2020 23:47)                ----  Personally reviewed:  Vital sign trends: [ x ] yes    [  ] no     [  ] n/a  Laboratory results: [ x ] yes    [  ] no     [  ] n/a  Radiology results: [ x ] yes    [  ] no     [  ] n/a  Culture results: [  ] yes    [  ] no     [ x ] n/a  Consultant recommendations: [ x ] yes    [  ] no     [  ] n/a

## 2020-11-28 NOTE — ED ADULT NURSE REASSESSMENT NOTE - NS ED NURSE REASSESS COMMENT FT1
Clear liquid provided per orders.
MD Way called regarding patient request for PO intake. Pt updated of such. pending orders from MD Way at this time.
Pt is Aox3 in NAD. Pt denies complaint.  IV clean dry and intact, running without difficulty. pt on hospital bed. Safety maintained, Bed locked in lowest position. Call bell in reach. Pt awaiting bed placement.  Pt completed blood transfusion resting well denies complaints.
Report recevied from fred SALGUERO. Pt resting comfortably in hospital bed, no complaints of pain or discomfort. Medication and fluids infusing per orders. Room safety reviewed w/ patient. pt updated on POC for day. Pending admission bed at this time. CTM.
blood transfusion in progress and pt sleeping with no c/p vital signs stable
pt reavaluated and vital signs stable report given and pt transfer to floor with cardiac monitor
pt stable blood transfusion completed at 5;30 and vital signs monitored pt medicated as ordered and remained on cardiac monitor awaiting bed assignment
pt stable one unit of PRBC started and in progress no c/o side effects noted and pt evaluated by Dr Bills and medicated as ordered with b/p meds  and awaiting bed assignment
pt stable first unit of prbc completed and no side effects noted pt admitted to tele but unable to go due to no tele box as per nurse and supervisor

## 2020-11-28 NOTE — CONSULT NOTE ADULT - ASSESSMENT
ARIELLA, CKD 3: Prerenal azotemia  Anemia, GI Bleed  Hypertension  Diabetes    Hold diuretics, Metformin. Continue IV hydration. Will change IVF to half NS. Monitor h/h trend. Transfuse PRN. Low iron levels. Will add IV iron and reacrit.   GI work up. Monitor BP trend. Titrate BP meds as needed. Salt restriction. Avoid nephrotoxic meds as possible. Avoid ACEI, ARB, NSAIDs and IV contrast.   Monitor blood sugar levels. Insulin coverage as needed. Will follow electrolytes and renal function trend.

## 2020-11-28 NOTE — CONSULT NOTE ADULT - PROBLEM SELECTOR RECOMMENDATION 9
suspected resolving diverticular bleed in setting of antiplatlet agent  hold plavix  hgb improved with transfusion  advance to clears  monitor for gi bleed  hgb daily

## 2020-11-29 LAB
ANION GAP SERPL CALC-SCNC: 12 MMOL/L — SIGNIFICANT CHANGE UP (ref 5–17)
BUN SERPL-MCNC: 16 MG/DL — SIGNIFICANT CHANGE UP (ref 7–23)
CALCIUM SERPL-MCNC: 8.6 MG/DL — SIGNIFICANT CHANGE UP (ref 8.5–10.1)
CHLORIDE SERPL-SCNC: 110 MMOL/L — HIGH (ref 96–108)
CO2 SERPL-SCNC: 22 MMOL/L — SIGNIFICANT CHANGE UP (ref 22–31)
CREAT SERPL-MCNC: 1.8 MG/DL — HIGH (ref 0.5–1.3)
GLUCOSE SERPL-MCNC: 97 MG/DL — SIGNIFICANT CHANGE UP (ref 70–99)
HCT VFR BLD CALC: 26.8 % — LOW (ref 39–50)
HCT VFR BLD CALC: 28 % — LOW (ref 39–50)
HGB BLD-MCNC: 8.7 G/DL — LOW (ref 13–17)
HGB BLD-MCNC: 8.9 G/DL — LOW (ref 13–17)
MCHC RBC-ENTMCNC: 28.3 PG — SIGNIFICANT CHANGE UP (ref 27–34)
MCHC RBC-ENTMCNC: 31.8 GM/DL — LOW (ref 32–36)
MCV RBC AUTO: 88.9 FL — SIGNIFICANT CHANGE UP (ref 80–100)
NRBC # BLD: 0 /100 WBCS — SIGNIFICANT CHANGE UP (ref 0–0)
PHOSPHATE SERPL-MCNC: 2.4 MG/DL — LOW (ref 2.5–4.5)
PLATELET # BLD AUTO: 297 K/UL — SIGNIFICANT CHANGE UP (ref 150–400)
POTASSIUM SERPL-MCNC: 3.3 MMOL/L — LOW (ref 3.5–5.3)
POTASSIUM SERPL-SCNC: 3.3 MMOL/L — LOW (ref 3.5–5.3)
RBC # BLD: 3.15 M/UL — LOW (ref 4.2–5.8)
RBC # FLD: 14.1 % — SIGNIFICANT CHANGE UP (ref 10.3–14.5)
SODIUM SERPL-SCNC: 144 MMOL/L — SIGNIFICANT CHANGE UP (ref 135–145)
WBC # BLD: 7.22 K/UL — SIGNIFICANT CHANGE UP (ref 3.8–10.5)
WBC # FLD AUTO: 7.22 K/UL — SIGNIFICANT CHANGE UP (ref 3.8–10.5)

## 2020-11-29 PROCEDURE — 99233 SBSQ HOSP IP/OBS HIGH 50: CPT | Mod: GC

## 2020-11-29 PROCEDURE — 99232 SBSQ HOSP IP/OBS MODERATE 35: CPT

## 2020-11-29 RX ORDER — INSULIN LISPRO 100/ML
VIAL (ML) SUBCUTANEOUS AT BEDTIME
Refills: 0 | Status: DISCONTINUED | OUTPATIENT
Start: 2020-11-29 | End: 2020-11-30

## 2020-11-29 RX ORDER — HYDRALAZINE HCL 50 MG
25 TABLET ORAL EVERY 8 HOURS
Refills: 0 | Status: DISCONTINUED | OUTPATIENT
Start: 2020-11-29 | End: 2020-11-30

## 2020-11-29 RX ORDER — POTASSIUM CHLORIDE 20 MEQ
40 PACKET (EA) ORAL ONCE
Refills: 0 | Status: COMPLETED | OUTPATIENT
Start: 2020-11-29 | End: 2020-11-29

## 2020-11-29 RX ORDER — INSULIN LISPRO 100/ML
VIAL (ML) SUBCUTANEOUS
Refills: 0 | Status: DISCONTINUED | OUTPATIENT
Start: 2020-11-29 | End: 2020-11-30

## 2020-11-29 RX ADMIN — MIRTAZAPINE 30 MILLIGRAM(S): 45 TABLET, ORALLY DISINTEGRATING ORAL at 21:28

## 2020-11-29 RX ADMIN — ERYTHROPOIETIN 10000 UNIT(S): 10000 INJECTION, SOLUTION INTRAVENOUS; SUBCUTANEOUS at 15:56

## 2020-11-29 RX ADMIN — Medication 15 MILLIGRAM(S): at 18:03

## 2020-11-29 RX ADMIN — IRON SUCROSE 100 MILLIGRAM(S): 20 INJECTION, SOLUTION INTRAVENOUS at 18:03

## 2020-11-29 RX ADMIN — AMLODIPINE BESYLATE 10 MILLIGRAM(S): 2.5 TABLET ORAL at 05:04

## 2020-11-29 RX ADMIN — Medication 25 MILLIGRAM(S): at 21:28

## 2020-11-29 RX ADMIN — Medication 150 MILLIGRAM(S): at 12:42

## 2020-11-29 RX ADMIN — Medication 4 MILLIGRAM(S): at 05:04

## 2020-11-29 RX ADMIN — Medication 4 MILLIGRAM(S): at 18:03

## 2020-11-29 RX ADMIN — Medication 145 MILLIGRAM(S): at 12:40

## 2020-11-29 RX ADMIN — Medication 100 MILLIGRAM(S): at 18:03

## 2020-11-29 RX ADMIN — Medication 100 MILLIGRAM(S): at 05:04

## 2020-11-29 RX ADMIN — SODIUM CHLORIDE 50 MILLILITER(S): 9 INJECTION, SOLUTION INTRAVENOUS at 18:03

## 2020-11-29 RX ADMIN — Medication 15 MILLIGRAM(S): at 05:04

## 2020-11-29 RX ADMIN — LAMOTRIGINE 100 MILLIGRAM(S): 25 TABLET, ORALLY DISINTEGRATING ORAL at 12:40

## 2020-11-29 RX ADMIN — ATORVASTATIN CALCIUM 40 MILLIGRAM(S): 80 TABLET, FILM COATED ORAL at 21:28

## 2020-11-29 RX ADMIN — Medication 25 MILLIGRAM(S): at 12:41

## 2020-11-29 RX ADMIN — Medication 15 MILLIGRAM(S): at 12:40

## 2020-11-29 RX ADMIN — Medication 40 MILLIEQUIVALENT(S): at 12:41

## 2020-11-29 RX ADMIN — Medication 5000 UNIT(S): at 12:41

## 2020-11-29 NOTE — PROGRESS NOTE ADULT - PROBLEM SELECTOR PLAN 2
Chronic.  -likely source of bleeding, however appreciate GI recs  -No current diverticulitis, monitor for s/s such as abdominal pain, fever, WBC count
Chronic.  -likely source of bleeding, however will consult GI  -No current diverticulitis, monitor for s/s such as abdominal pain, fever, WBC count

## 2020-11-29 NOTE — PROGRESS NOTE ADULT - PROBLEM SELECTOR PLAN 10
SCDs given current GI bleed.  IMPROVE VTE Score:         [    1     ]      11. Hepatitis C  -f/u AM Hep C panel    12. Lupus  -chronic, no active meds  -hold home NSAIDs in setting of bleed
SCDs given current GI bleed.  IMPROVE VTE Score:         [    1     ]      11. Hepatitis C  -f/u AM Hep C panel    12. Lupus  -chronic, no active meds  -hold home NSAIDs in setting of bleed

## 2020-11-29 NOTE — PROGRESS NOTE ADULT - SUBJECTIVE AND OBJECTIVE BOX
[facility] -- Fifi Bliss, Kalpesh Valdovinos, Abraham Sheridan Savella, [place]  Office # [phone]    Follow Up:  Elevated Troponins and cardiac murmur    Subjective/Observations: Awake and alert,  comfortably laying flat on RA.  Denies SOB, VALDEZ or orthopnea.  Denies dizziness or lightheadedness.  Denies melena or abdominal pain.  Denies CP or palpitatins     REVIEW OF SYSTEMS: All other review of systems is negative unless indicated above  PAST MEDICAL & SURGICAL HISTORY:  Hyperlipidemia    Diverticulosis    CAD (coronary artery disease)  s/p stents    Anxiety and depression    Hepatitis C    Lupus    Diabetes mellitus    Hypertension    H/O hernia repair    S/P cataract surgery  Bilateral    Pilonidal cyst  Had surgery ( 1969 )    Torsion of testicle  Had surgery at age 14    S/P arthroscopic knee surgery  Bilateral ( 2005 )    S/P tonsillectomy    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  Had surgery ( April 2013 )    MEDICATIONS  (STANDING):  amLODIPine   Tablet 10 milliGRAM(s) Oral daily  atorvastatin 40 milliGRAM(s) Oral at bedtime  busPIRone 15 milliGRAM(s) Oral two times a day  cholecalciferol 5000 Unit(s) Oral daily  dextrose 40% Gel 15 Gram(s) Oral once  dextrose 5%. 1000 milliLiter(s) (50 mL/Hr) IV Continuous <Continuous>  dextrose 5%. 1000 milliLiter(s) (100 mL/Hr) IV Continuous <Continuous>  dextrose 50% Injectable 25 Gram(s) IV Push once  dextrose 50% Injectable 12.5 Gram(s) IV Push once  dextrose 50% Injectable 25 Gram(s) IV Push once  doxazosin 4 milliGRAM(s) Oral <User Schedule>  doxepin 25 milliGRAM(s) Oral at bedtime  epoetin michael-epbx (RETACRIT) Injectable 81793 Unit(s) SubCutaneous once  fenofibrate Tablet 145 milliGRAM(s) Oral daily  glucagon  Injectable 1 milliGRAM(s) IntraMuscular once  hydrALAZINE 25 milliGRAM(s) Oral every 8 hours  influenza   Vaccine 0.5 milliLiter(s) IntraMuscular once  insulin lispro (ADMELOG) corrective regimen sliding scale   SubCutaneous three times a day before meals  insulin lispro (ADMELOG) corrective regimen sliding scale   SubCutaneous at bedtime  iron sucrose Injectable 100 milliGRAM(s) IV Push <User Schedule>  labetalol 100 milliGRAM(s) Oral two times a day  lamoTRIgine 100 milliGRAM(s) Oral daily  mirtazapine 30 milliGRAM(s) Oral at bedtime  oxybutynin 15 milliGRAM(s) Oral daily  pantoprazole Infusion 8 mG/Hr (10 mL/Hr) IV Continuous <Continuous>  potassium chloride    Tablet ER 40 milliEquivalent(s) Oral once  sodium chloride 0.45%. 1000 milliLiter(s) (50 mL/Hr) IV Continuous <Continuous>  venlafaxine XR. 150 milliGRAM(s) Oral daily    MEDICATIONS  (PRN):    Allergies    No Known Allergies    Intolerances    Vital Signs Last 24 Hrs  T(C): 36.4 (29 Nov 2020 08:00), Max: 36.8 (28 Nov 2020 20:43)  T(F): 97.6 (29 Nov 2020 08:00), Max: 98.3 (28 Nov 2020 20:43)  HR: 60 (29 Nov 2020 08:00) (50 - 86)  BP: 179/77 (29 Nov 2020 08:00) (145/75 - 179/77)  BP(mean): --  RR: 18 (29 Nov 2020 08:00) (16 - 20)  SpO2: 92% (29 Nov 2020 08:00) (92% - 96%)  I&O's Summary    28 Nov 2020 07:01  -  29 Nov 2020 07:00  --------------------------------------------------------  IN: 0 mL / OUT: 300 mL / NET: -300 mL     PHYSICAL EXAM:  TELE: NSR  Constitutional: NAD, awake and alert, obese  HEENT: Moist Mucous Membranes, Anicteric  Pulmonary: Non-labored, breath sounds are clear bilaterally, No wheezing, rales or rhonchi  Cardiovascular: Regular, S1 and S2, + murmurs, no rubs, gallops or clicks  Gastrointestinal: Bowel Sounds present, soft, nontender.   Lymph: No peripheral edema. No lymphadenopathy.  Skin: No visible rashes or ulcers.  Psych:  Mood & affect appropriate  LABS: All Labs Reviewed:                        8.9    7.22  )-----------( 297      ( 29 Nov 2020 07:14 )             28.0                         9.0    x     )-----------( x        ( 28 Nov 2020 19:42 )             27.6                         8.0    x     )-----------( x        ( 28 Nov 2020 12:44 )             24.8     29 Nov 2020 07:14    144    |  110    |  16     ----------------------------<  97     3.3     |  22     |  1.80   28 Nov 2020 05:52    142    |  109    |  25     ----------------------------<  119    3.5     |  25     |  2.30   27 Nov 2020 17:43    142    |  108    |  32     ----------------------------<  113    3.8     |  25     |  2.50     Ca    8.6        29 Nov 2020 07:14  Ca    8.9        28 Nov 2020 05:52  Ca    8.8        27 Nov 2020 17:43  Phos  2.4       29 Nov 2020 07:14  Phos  3.0       28 Nov 2020 05:52  Mg     1.9       28 Nov 2020 05:52    TPro  6.5    /  Alb  3.4    /  TBili  0.5    /  DBili  x      /  AST  22     /  ALT  22     /  AlkPhos  31     28 Nov 2020 05:52  TPro  6.8    /  Alb  3.4    /  TBili  0.3    /  DBili  x      /  AST  18     /  ALT  21     /  AlkPhos  30     27 Nov 2020 17:43    PT/INR - ( 28 Nov 2020 05:52 )   PT: 13.3 sec;   INR: 1.14 ratio      PTT - ( 28 Nov 2020 05:52 )  PTT:32.0 sec  CARDIAC MARKERS ( 28 Nov 2020 05:52 )  .061 ng/mL / x     / 77 U/L / x     / 1.8 ng/mL  CARDIAC MARKERS ( 27 Nov 2020 22:08 )  .077 ng/mL / x     / 73 U/L / x     / 1.8 ng/mL    EXAM:  XR CHEST PORTABLE URGENT 1V                          PROCEDURE DATE:  11/27/2020      INTERPRETATION:  Exam:XR CHEST URGENT    clinical history:rectal bleed    Heart size is normal. Lungs show no acute infiltrate. No pleural effusion.    IMPRESSION:  No active parenchymal disease in the chest.    ARASH VASQUES MD; Attending Radiologist  This document has been electronically signed. Nov 28 2020  8:27AM    Ventricular Rate 54 BPM    Atrial Rate 54 BPM    P-R Interval 334 ms    QRS Duration 112 ms    Q-T Interval 360 ms    QTC Calculation(Bazett) 341 ms    R Axis -17 degrees    T Axis 228 degrees    Diagnosis Line Sinus bradycardia with 1st degree AV block  Incomplete right bundle branch block  Left ventricular hypertrophy with repolarization abnormality  Abnormal ECG  When compared with ECG of 24-JAN-2016 22:16,  ST now depressed in Anterolateral leads  Nonspecific T wave abnormality now evident in Inferior leads  T wave inversion now evident in Anterolateral leads  QT has shortened  Confirmed by Domingo Posey MD ([id number]) on 11/28/2020 2:23:56 PM

## 2020-11-29 NOTE — PROGRESS NOTE ADULT - SUBJECTIVE AND OBJECTIVE BOX
[facility]  Radu Weber PA-C  [address] [place] [address]  [phone]      INTERVAL HPI/OVERNIGHT EVENTS:    no new events  no rectal bleeding  hgb improved  tolerating clears     MEDICATIONS  (STANDING):  amLODIPine   Tablet 10 milliGRAM(s) Oral daily  atorvastatin 40 milliGRAM(s) Oral at bedtime  busPIRone 15 milliGRAM(s) Oral two times a day  cholecalciferol 5000 Unit(s) Oral daily  dextrose 40% Gel 15 Gram(s) Oral once  dextrose 5%. 1000 milliLiter(s) (50 mL/Hr) IV Continuous <Continuous>  dextrose 5%. 1000 milliLiter(s) (100 mL/Hr) IV Continuous <Continuous>  dextrose 50% Injectable 25 Gram(s) IV Push once  dextrose 50% Injectable 12.5 Gram(s) IV Push once  dextrose 50% Injectable 25 Gram(s) IV Push once  doxazosin 4 milliGRAM(s) Oral <User Schedule>  doxepin 25 milliGRAM(s) Oral at bedtime  epoetin michael-epbx (RETACRIT) Injectable 60791 Unit(s) SubCutaneous once  fenofibrate Tablet 145 milliGRAM(s) Oral daily  glucagon  Injectable 1 milliGRAM(s) IntraMuscular once  hydrALAZINE 25 milliGRAM(s) Oral every 8 hours  influenza   Vaccine 0.5 milliLiter(s) IntraMuscular once  insulin lispro (ADMELOG) corrective regimen sliding scale   SubCutaneous three times a day before meals  insulin lispro (ADMELOG) corrective regimen sliding scale   SubCutaneous at bedtime  iron sucrose Injectable 100 milliGRAM(s) IV Push <User Schedule>  labetalol 100 milliGRAM(s) Oral two times a day  lamoTRIgine 100 milliGRAM(s) Oral daily  mirtazapine 30 milliGRAM(s) Oral at bedtime  oxybutynin 15 milliGRAM(s) Oral daily  pantoprazole Infusion 8 mG/Hr (10 mL/Hr) IV Continuous <Continuous>  potassium chloride    Tablet ER 40 milliEquivalent(s) Oral once  sodium chloride 0.45%. 1000 milliLiter(s) (50 mL/Hr) IV Continuous <Continuous>  venlafaxine XR. 150 milliGRAM(s) Oral daily    MEDICATIONS  (PRN):      Allergies    No Known Allergies    Intolerances        ROS:   General:  No wt loss, fevers, chills, night sweats, fatigue,   Eyes:  Good vision, no reported pain  ENT:  No sore throat, pain, runny nose, dysphagia  CV:  No pain, palpitations, hypo/hypertension  Resp:  No dyspnea, cough, tachypnea, wheezing  GI:  No pain, No nausea, No vomiting, No diarrhea, No constipation, No weight loss, No fever, No pruritis, No rectal bleeding, No tarry stools, No dysphagia,  :  No pain, bleeding, incontinence, nocturia  Muscle:  No pain, weakness  Neuro:  No weakness, tingling, memory problems  Psych:  No fatigue, insomnia, mood problems, depression  Endocrine:  No polyuria, polydipsia, cold/heat intolerance  Heme:  No petechiae, ecchymosis, easy bruisability  Skin:  No rash, tattoos, scars, edema      PHYSICAL EXAM:   Vital Signs:  Vital Signs Last 24 Hrs  T(C): 36.6 (29 Nov 2020 12:14), Max: 36.8 (28 Nov 2020 20:43)  T(F): 97.9 (29 Nov 2020 12:14), Max: 98.3 (28 Nov 2020 20:43)  HR: 68 (29 Nov 2020 12:14) (51 - 86)  BP: 168/80 (29 Nov 2020 12:13) (156/81 - 179/77)  BP(mean): --  RR: 17 (29 Nov 2020 12:14) (16 - 20)  SpO2: 94% (29 Nov 2020 12:14) (92% - 96%)  Daily     Daily     GENERAL:  Appears stated age, well-groomed, well-nourished, no distress  HEENT:  NC/AT,  conjunctivae clear and pink, no thyromegaly, nodules, adenopathy, no JVD, sclera -anicteric  CHEST:  Full & symmetric excursion, no increased effort, breath sounds clear  HEART:  Regular rhythm, S1, S2, no murmur/rub/S3/S4, no abdominal bruit, no edema  ABDOMEN:  Soft, non-tender, non-distended, normoactive bowel sounds,  no masses ,no hepato-splenomegaly, no signs of chronic liver disease  EXTEREMITIES:  no cyanosis,clubbing or edema  SKIN:  No rash/erythema/ecchymoses/petechiae/wounds/abscess/warm/dry  NEURO:  Alert, oriented, no asterixis, no tremor, no encephalopathy      LABS:                        8.9    7.22  )-----------( 297      ( 29 Nov 2020 07:14 )             28.0     11-29    144  |  110<H>  |  16  ----------------------------<  97  3.3<L>   |  22  |  1.80<H>    Ca    8.6      29 Nov 2020 07:14  Phos  2.4     11-29  Mg     1.9     11-28    TPro  6.5  /  Alb  3.4  /  TBili  0.5  /  DBili  x   /  AST  22  /  ALT  22  /  AlkPhos  31<L>  11-28    PT/INR - ( 28 Nov 2020 05:52 )   PT: 13.3 sec;   INR: 1.14 ratio         PTT - ( 28 Nov 2020 05:52 )  PTT:32.0 sec      RADIOLOGY & ADDITIONAL TESTS:

## 2020-11-29 NOTE — PROGRESS NOTE ADULT - PROBLEM SELECTOR PLAN 6
Non-insulin dependent T2DM. Home meds include metformin 500mg BID.  -hold home oral metformin  -start low dose insulin corrective scale while NPO  -monitor blood glucose  -hypoglycemia protocol  -HbA1C 6  -B12 and folate levels wnl  -peripheral neuropathy noted on exam - pt to f/u with podiatry OP
Non-insulin dependent T2DM. Home meds include metformin 500mg BID.  -hold home oral metformin  -start low dose insulin corrective scale while NPO  -monitor blood glucose  -hypoglycemia protocol  -HbA1C 6  -follow up B12 and folate levels  -peripheral neuropathy noted on exam - pt to f/u with podiatry outpatient for monofilament testing. Bedside TPD revealed lack of sensation to dull/sharp.

## 2020-11-29 NOTE — PROGRESS NOTE ADULT - PROBLEM SELECTOR PLAN 7
Chronic, patient with history of malignant HTN per PMD. Home meds include amlodipine 10mg, HCTZ 12.5mg, labetalol 300mg BID  -continue BP meds with hold parameters except HCTZ given ARIELLA  -monitor routine hemodynamics
Chronic, patient with history of malignant HTN per PMD. Home meds include amlodipine 10mg, HCTZ 12.5mg, labetalol 300mg BID  -continue BP meds with hold parameters except HCTZ given ARIELLA  -monitor routine hemodynamics

## 2020-11-29 NOTE — PROGRESS NOTE ADULT - PROBLEM SELECTOR PLAN 1
Not currently actively bleeding  - admitted for dark Red Blood Per Rectum x3 days w/ passage of clots. Hb 6.7 on admission. Likely diverticular bleed. Baseline H/H per PMD was 11.5/35. Patient also on meloxicam and plavix - will keep on PPI gtt  -CT a/p: Extensive sigmoid diverticulosis without diverticulitis.  -s/p 3U PRBCs w/ good response  -continue to monitor H/H q8h  - f/u repeat H/H 15:00, AM labs showed Hemoglobin 8.9, if continues to go down in PM, will transfuse 1u to keep Hb > 9 per cardio  -VS currently stable, continue to monitor  -hold home meloxicam and plavix  -tolerating clear liquids per GI.   -GI, Dr. Bah, following. Will f/u to see if patient scheduled for colonoscopy.
Pt with dark red blood per rectum x3 days also with passage of clots. Hb 6.7 on admission. Likely diverticular bleed. Baseline H/H per PMD was 11.5/35. Patient also on meloxicam and plavix - will keep on PPI gtt  -CT a/p: Extensive sigmoid diverticulosis without diverticulitis.  -s/p 2U PRBCs in ED with good response, continue to monitor H/H q8h, will tranfuse 1 additional unit now, f/u repeat H/H  -transfuse to keep Hb > 9 per cardio  -VS currently stable, continue to monitor  -hold home meloxicam and plavix  -advance to clear liquids per GI  -Consult GI Dr. Bah appreciated

## 2020-11-29 NOTE — PROGRESS NOTE ADULT - PROBLEM SELECTOR PLAN 5
Cr 2.50 on admission, AM labs 1.8. Was 1.6 in January. Likely ARIELLA on CKD3  -Pt told 4 years ago during stents to have kidney function checked, did not follow up  -will give IVF, continue 1/2NS  -hold home meloxicam and HCTZ, digoxin  -avoid nephrotoxic agents  -nephro, Dr. Rodarte, following
Cr 2.50 on admission. Was 1.6 in January. Likely ARIELLA on CKD3  -Pt told 4 years ago during stents to have kidney function checked, did not follow up  -will give IVF, continue 1/2NS  -f/u AM BMP  -hold home meloxicam and HCTZ  -avoid nephrotoxic agents  -of note, patient's personal med list includes digoxin 250mcg daily. Spoke with PMD, he is unsure why pt is taking it. Will hold at this time given ARIELLA.  -nephro Dr. Rodarte consulted, f/u recs

## 2020-11-29 NOTE — PROGRESS NOTE ADULT - PROBLEM SELECTOR PLAN 8
Chronic. Home meds include crestor 10mg, fenofibrate 145mg, vascepa 1g BID.  -continue statin, fenofibrate, vascepa
Chronic. Home meds include crestor 10mg, fenofibrate 145mg, vascepa 1g BID.  -continue statin, fenofibrate, vascepa

## 2020-11-29 NOTE — PROGRESS NOTE ADULT - SUBJECTIVE AND OBJECTIVE BOX
Patient is a 75y old  Male who presents with a chief complaint of GI bleed (29 Nov 2020 10:25)      INTERVAL HPI/OVERNIGHT EVENTS: Patient seen and examined at bedside. No overnight events occurred. Patient admits mild abd discomfort, L>R. Patient reports poor sleep but does not want melatonin as he can get that at home. Patient reports last BM was Wednesday but attributes no BM 2/2 decreased PO intake and not having regular diet. Patient is tolerating clear liquid diet, but is unhappy with current diet. Patient agitated and annoyed during morning interview and deferred physical exam initially but allowed exam on morning rounds. Denies fevers, chills, headache, lightheadedness, chest pain, dyspnea, n/v/d, melena, hematochezia.  Tele: Sinus Homer 59, First degree AVB. Trend 50s. No events.     MEDICATIONS  (STANDING):  amLODIPine   Tablet 10 milliGRAM(s) Oral daily  atorvastatin 40 milliGRAM(s) Oral at bedtime  busPIRone 15 milliGRAM(s) Oral two times a day  cholecalciferol 5000 Unit(s) Oral daily  dextrose 40% Gel 15 Gram(s) Oral once  dextrose 5%. 1000 milliLiter(s) (50 mL/Hr) IV Continuous <Continuous>  dextrose 5%. 1000 milliLiter(s) (100 mL/Hr) IV Continuous <Continuous>  dextrose 50% Injectable 25 Gram(s) IV Push once  dextrose 50% Injectable 12.5 Gram(s) IV Push once  dextrose 50% Injectable 25 Gram(s) IV Push once  doxazosin 4 milliGRAM(s) Oral <User Schedule>  doxepin 25 milliGRAM(s) Oral at bedtime  epoetin michael-epbx (RETACRIT) Injectable 08374 Unit(s) SubCutaneous once  fenofibrate Tablet 145 milliGRAM(s) Oral daily  glucagon  Injectable 1 milliGRAM(s) IntraMuscular once  hydrALAZINE 25 milliGRAM(s) Oral every 8 hours  influenza   Vaccine 0.5 milliLiter(s) IntraMuscular once  insulin lispro (ADMELOG) corrective regimen sliding scale   SubCutaneous three times a day before meals  insulin lispro (ADMELOG) corrective regimen sliding scale   SubCutaneous at bedtime  iron sucrose Injectable 100 milliGRAM(s) IV Push <User Schedule>  labetalol 100 milliGRAM(s) Oral two times a day  lamoTRIgine 100 milliGRAM(s) Oral daily  mirtazapine 30 milliGRAM(s) Oral at bedtime  oxybutynin 15 milliGRAM(s) Oral daily  pantoprazole Infusion 8 mG/Hr (10 mL/Hr) IV Continuous <Continuous>  potassium chloride    Tablet ER 40 milliEquivalent(s) Oral once  sodium chloride 0.45%. 1000 milliLiter(s) (50 mL/Hr) IV Continuous <Continuous>  venlafaxine XR. 150 milliGRAM(s) Oral daily    MEDICATIONS  (PRN):      Allergies    No Known Allergies    Intolerances        REVIEW OF SYSTEMS:  CONSTITUTIONAL: No fever or chills  HEENT:  No headache, no sore throat  RESPIRATORY: No cough, wheezing, or shortness of breath  CARDIOVASCULAR: No chest pain, palpitations  GASTROINTESTINAL: +abd pain, no nausea, vomiting, or diarrhea  GENITOURINARY: No dysuria, frequency, or hematuria  NEUROLOGICAL: no focal weakness or dizziness  MUSCULOSKELETAL: no myalgias     Vital Signs Last 24 Hrs  T(C): 36.4 (29 Nov 2020 08:00), Max: 36.8 (28 Nov 2020 20:43)  T(F): 97.6 (29 Nov 2020 08:00), Max: 98.3 (28 Nov 2020 20:43)  HR: 60 (29 Nov 2020 08:00) (50 - 86)  BP: 179/77 (29 Nov 2020 08:00) (145/75 - 179/77)  BP(mean): --  RR: 18 (29 Nov 2020 08:00) (16 - 20)  SpO2: 92% (29 Nov 2020 08:00) (92% - 96%)    PHYSICAL EXAM:  GENERAL: NAD, slightly agitated, flat affect  HEENT:  anicteric, moist mucous membranes  CHEST/LUNG:  CTA b/l, no rales, wheezes, or rhonchi  HEART:  RRR, +S1/ S2  ABDOMEN:  BS+, soft, +left quadrant abd ttp, nondistended, +obese  EXTREMITIES: no edema, cyanosis, or calf tenderness  NERVOUS SYSTEM: answers questions and follows commands appropriately    LABS:                        8.9    7.22  )-----------( 297      ( 29 Nov 2020 07:14 )             28.0     CBC Full  -  ( 29 Nov 2020 07:14 )  WBC Count : 7.22 K/uL  Hemoglobin : 8.9 g/dL  Hematocrit : 28.0 %  Platelet Count - Automated : 297 K/uL  Mean Cell Volume : 88.9 fl  Mean Cell Hemoglobin : 28.3 pg  Mean Cell Hemoglobin Concentration : 31.8 gm/dL  Auto Neutrophil # : x  Auto Lymphocyte # : x  Auto Monocyte # : x  Auto Eosinophil # : x  Auto Basophil # : x  Auto Neutrophil % : x  Auto Lymphocyte % : x  Auto Monocyte % : x  Auto Eosinophil % : x  Auto Basophil % : x    29 Nov 2020 07:14    144    |  110    |  16     ----------------------------<  97     3.3     |  22     |  1.80     Ca    8.6        29 Nov 2020 07:14  Phos  2.4       29 Nov 2020 07:14      PT/INR - ( 28 Nov 2020 05:52 )   PT: 13.3 sec;   INR: 1.14 ratio         PTT - ( 28 Nov 2020 05:52 )  PTT:32.0 sec    CAPILLARY BLOOD GLUCOSE      POCT Blood Glucose.: 124 mg/dL (29 Nov 2020 08:01)  POCT Blood Glucose.: 147 mg/dL (28 Nov 2020 21:23)  POCT Blood Glucose.: 119 mg/dL (28 Nov 2020 18:34)  POCT Blood Glucose.: 170 mg/dL (28 Nov 2020 11:46)          RADIOLOGY & ADDITIONAL TESTS:    Personally reviewed.     Consultant(s) Notes Reviewed:  [x] YES  [ ] NO

## 2020-11-29 NOTE — PROGRESS NOTE ADULT - PROBLEM SELECTOR PLAN 9
Chronic. Home meds include aripiprazole 400mg monthly injections, lamictal 100mg, remeron 30mg, effexor 150mg, buspar 15mg BID.  -continue lamictal, remeron, effexor, buspar
Chronic. Home meds include aripiprazole 400mg monthly injections, lamictal 100mg, remeron 30mg, effexor 150mg, buspar 15mg BID.  -continue lamictal, remeron, effexor, buspar

## 2020-11-29 NOTE — PROGRESS NOTE ADULT - PROBLEM SELECTOR PLAN 4
Pt with R side sharp CP x3 days and L side dull CP for 1 day, now resolved. No known hx heart failure. ?related to symptomatic anemia in setting of CAD.  -pt currently without symptoms - EKG SR with 1st deg AV block RBBB twi V3-V6.  -will check cardiac enzymes, trend if positive  -follow up TTE  -Consult cardiology [facility]
Pt with R side sharp CP x3 days and L side dull CP for 1 day, now resolved. No known hx heart failure. ?related to symptomatic anemia in setting of CAD.  -pt currently without symptoms - EKG SR with 1st deg AV block RBBB twi V3-V6.  -will check cardiac enzymes, trend if positive  -follow up TTE  -Consult cardiology [facility]

## 2020-11-29 NOTE — PROGRESS NOTE ADULT - SUBJECTIVE AND OBJECTIVE BOX
Patient is a 75y old  Male who presents with a chief complaint of GI bleed (29 Nov 2020 10:03)  Patient seen in follow up for ARIELLA.        PAST MEDICAL HISTORY:  Hyperlipidemia    Diverticulosis    CAD (coronary artery disease)    Anxiety and depression    Hepatitis C    Lupus    Diabetes mellitus    Hypertension      MEDICATIONS  (STANDING):  amLODIPine   Tablet 10 milliGRAM(s) Oral daily  atorvastatin 40 milliGRAM(s) Oral at bedtime  busPIRone 15 milliGRAM(s) Oral two times a day  cholecalciferol 5000 Unit(s) Oral daily  dextrose 40% Gel 15 Gram(s) Oral once  dextrose 5%. 1000 milliLiter(s) (50 mL/Hr) IV Continuous <Continuous>  dextrose 5%. 1000 milliLiter(s) (100 mL/Hr) IV Continuous <Continuous>  dextrose 50% Injectable 25 Gram(s) IV Push once  dextrose 50% Injectable 12.5 Gram(s) IV Push once  dextrose 50% Injectable 25 Gram(s) IV Push once  doxazosin 4 milliGRAM(s) Oral <User Schedule>  doxepin 25 milliGRAM(s) Oral at bedtime  epoetin michael-epbx (RETACRIT) Injectable 22966 Unit(s) SubCutaneous once  fenofibrate Tablet 145 milliGRAM(s) Oral daily  glucagon  Injectable 1 milliGRAM(s) IntraMuscular once  hydrALAZINE 25 milliGRAM(s) Oral every 8 hours  influenza   Vaccine 0.5 milliLiter(s) IntraMuscular once  insulin lispro (ADMELOG) corrective regimen sliding scale   SubCutaneous three times a day before meals  insulin lispro (ADMELOG) corrective regimen sliding scale   SubCutaneous at bedtime  iron sucrose Injectable 100 milliGRAM(s) IV Push <User Schedule>  labetalol 100 milliGRAM(s) Oral two times a day  lamoTRIgine 100 milliGRAM(s) Oral daily  mirtazapine 30 milliGRAM(s) Oral at bedtime  oxybutynin 15 milliGRAM(s) Oral daily  pantoprazole Infusion 8 mG/Hr (10 mL/Hr) IV Continuous <Continuous>  potassium chloride    Tablet ER 40 milliEquivalent(s) Oral once  sodium chloride 0.45%. 1000 milliLiter(s) (50 mL/Hr) IV Continuous <Continuous>  venlafaxine XR. 150 milliGRAM(s) Oral daily    MEDICATIONS  (PRN):    T(C): 36.4 (11-29-20 @ 08:00), Max: 37.2 (11-28-20 @ 02:00)  HR: 60 (11-29-20 @ 08:00) (48 - 86)  BP: 179/77 (11-29-20 @ 08:00) (145/75 - 191/83)  RR: 18 (11-29-20 @ 08:00) (16 - 20)  SpO2: 92% (11-29-20 @ 08:00) (92% - 100%)  Wt(kg): --  I&O's Detail    28 Nov 2020 07:01  -  29 Nov 2020 07:00  --------------------------------------------------------  IN:  Total IN: 0 mL    OUT:    Voided (mL): 300 mL  Total OUT: 300 mL    Total NET: -300 mL          PHYSICAL EXAM:  General: No distress  Respiratory: b/l air entry  Cardiovascular: S1 S2  Gastrointestinal: soft  Extremities:  no edema                              8.9    7.22  )-----------( 297      ( 29 Nov 2020 07:14 )             28.0     11-29    144  |  110<H>  |  16  ----------------------------<  97  3.3<L>   |  22  |  1.80<H>    Ca    8.6      29 Nov 2020 07:14  Phos  2.4     11-29  Mg     1.9     11-28    TPro  6.5  /  Alb  3.4  /  TBili  0.5  /  DBili  x   /  AST  22  /  ALT  22  /  AlkPhos  31<L>  11-28    CARDIAC MARKERS ( 28 Nov 2020 05:52 )  .061 ng/mL / x     / 77 U/L / x     / 1.8 ng/mL  CARDIAC MARKERS ( 27 Nov 2020 22:08 )  .077 ng/mL / x     / 73 U/L / x     / 1.8 ng/mL      LIVER FUNCTIONS - ( 28 Nov 2020 05:52 )  Alb: 3.4 g/dL / Pro: 6.5 g/dL / ALK PHOS: 31 U/L / ALT: 22 U/L / AST: 22 U/L / GGT: x               Sodium, Serum: 144 (11-29 @ 07:14)  Sodium, Serum: 142 (11-28 @ 05:52)  Sodium, Serum: 142 (11-27 @ 17:43)    Creatinine, Serum: 1.80 (11-29 @ 07:14)  Creatinine, Serum: 2.30 (11-28 @ 05:52)  Creatinine, Serum: 2.50 (11-27 @ 17:43)    Potassium, Serum: 3.3 (11-29 @ 07:14)  Potassium, Serum: 3.5 (11-28 @ 05:52)  Potassium, Serum: 3.8 (11-27 @ 17:43)    Hemoglobin: 8.9 (11-29 @ 07:14)  Hemoglobin: 9.0 (11-28 @ 19:42)  Hemoglobin: 8.0 (11-28 @ 12:44)  Hemoglobin: 8.2 (11-28 @ 05:52)

## 2020-11-30 ENCOUNTER — TRANSCRIPTION ENCOUNTER (OUTPATIENT)
Age: 75
End: 2020-11-30

## 2020-11-30 VITALS
OXYGEN SATURATION: 99 % | DIASTOLIC BLOOD PRESSURE: 81 MMHG | TEMPERATURE: 98 F | RESPIRATION RATE: 18 BRPM | HEART RATE: 63 BPM | SYSTOLIC BLOOD PRESSURE: 167 MMHG

## 2020-11-30 LAB
ALBUMIN SERPL ELPH-MCNC: 3.4 G/DL — SIGNIFICANT CHANGE UP (ref 3.3–5)
ALP SERPL-CCNC: 36 U/L — LOW (ref 40–120)
ALT FLD-CCNC: 25 U/L — SIGNIFICANT CHANGE UP (ref 12–78)
ANION GAP SERPL CALC-SCNC: 5 MMOL/L — SIGNIFICANT CHANGE UP (ref 5–17)
AST SERPL-CCNC: 23 U/L — SIGNIFICANT CHANGE UP (ref 15–37)
BASOPHILS # BLD AUTO: 0.01 K/UL — SIGNIFICANT CHANGE UP (ref 0–0.2)
BASOPHILS NFR BLD AUTO: 0.1 % — SIGNIFICANT CHANGE UP (ref 0–2)
BILIRUB SERPL-MCNC: 0.4 MG/DL — SIGNIFICANT CHANGE UP (ref 0.2–1.2)
BUN SERPL-MCNC: 12 MG/DL — SIGNIFICANT CHANGE UP (ref 7–23)
CALCIUM SERPL-MCNC: 8.6 MG/DL — SIGNIFICANT CHANGE UP (ref 8.5–10.1)
CHLORIDE SERPL-SCNC: 109 MMOL/L — HIGH (ref 96–108)
CO2 SERPL-SCNC: 26 MMOL/L — SIGNIFICANT CHANGE UP (ref 22–31)
CREAT SERPL-MCNC: 1.9 MG/DL — HIGH (ref 0.5–1.3)
EOSINOPHIL # BLD AUTO: 0.13 K/UL — SIGNIFICANT CHANGE UP (ref 0–0.5)
EOSINOPHIL NFR BLD AUTO: 1.6 % — SIGNIFICANT CHANGE UP (ref 0–6)
GLUCOSE SERPL-MCNC: 161 MG/DL — HIGH (ref 70–99)
HCT VFR BLD CALC: 29 % — LOW (ref 39–50)
HGB BLD-MCNC: 9.2 G/DL — LOW (ref 13–17)
IMM GRANULOCYTES NFR BLD AUTO: 0.5 % — SIGNIFICANT CHANGE UP (ref 0–1.5)
LYMPHOCYTES # BLD AUTO: 1.16 K/UL — SIGNIFICANT CHANGE UP (ref 1–3.3)
LYMPHOCYTES # BLD AUTO: 14.4 % — SIGNIFICANT CHANGE UP (ref 13–44)
MAGNESIUM SERPL-MCNC: 2.1 MG/DL — SIGNIFICANT CHANGE UP (ref 1.6–2.6)
MCHC RBC-ENTMCNC: 28.6 PG — SIGNIFICANT CHANGE UP (ref 27–34)
MCHC RBC-ENTMCNC: 31.7 GM/DL — LOW (ref 32–36)
MCV RBC AUTO: 90.1 FL — SIGNIFICANT CHANGE UP (ref 80–100)
MONOCYTES # BLD AUTO: 0.36 K/UL — SIGNIFICANT CHANGE UP (ref 0–0.9)
MONOCYTES NFR BLD AUTO: 4.5 % — SIGNIFICANT CHANGE UP (ref 2–14)
NEUTROPHILS # BLD AUTO: 6.36 K/UL — SIGNIFICANT CHANGE UP (ref 1.8–7.4)
NEUTROPHILS NFR BLD AUTO: 78.9 % — HIGH (ref 43–77)
NRBC # BLD: 0 /100 WBCS — SIGNIFICANT CHANGE UP (ref 0–0)
PHOSPHATE SERPL-MCNC: 2 MG/DL — LOW (ref 2.5–4.5)
PLATELET # BLD AUTO: 293 K/UL — SIGNIFICANT CHANGE UP (ref 150–400)
POTASSIUM SERPL-MCNC: 4.1 MMOL/L — SIGNIFICANT CHANGE UP (ref 3.5–5.3)
POTASSIUM SERPL-SCNC: 4.1 MMOL/L — SIGNIFICANT CHANGE UP (ref 3.5–5.3)
PROT SERPL-MCNC: 6.8 G/DL — SIGNIFICANT CHANGE UP (ref 6–8.3)
RBC # BLD: 3.22 M/UL — LOW (ref 4.2–5.8)
RBC # FLD: 13.9 % — SIGNIFICANT CHANGE UP (ref 10.3–14.5)
SODIUM SERPL-SCNC: 140 MMOL/L — SIGNIFICANT CHANGE UP (ref 135–145)
WBC # BLD: 8.06 K/UL — SIGNIFICANT CHANGE UP (ref 3.8–10.5)
WBC # FLD AUTO: 8.06 K/UL — SIGNIFICANT CHANGE UP (ref 3.8–10.5)

## 2020-11-30 PROCEDURE — 99239 HOSP IP/OBS DSCHRG MGMT >30: CPT

## 2020-11-30 PROCEDURE — 85018 HEMOGLOBIN: CPT

## 2020-11-30 PROCEDURE — 82746 ASSAY OF FOLIC ACID SERUM: CPT

## 2020-11-30 PROCEDURE — 84484 ASSAY OF TROPONIN QUANT: CPT

## 2020-11-30 PROCEDURE — U0003: CPT

## 2020-11-30 PROCEDURE — 93306 TTE W/DOPPLER COMPLETE: CPT

## 2020-11-30 PROCEDURE — 83540 ASSAY OF IRON: CPT

## 2020-11-30 PROCEDURE — 36415 COLL VENOUS BLD VENIPUNCTURE: CPT

## 2020-11-30 PROCEDURE — 86850 RBC ANTIBODY SCREEN: CPT

## 2020-11-30 PROCEDURE — 82728 ASSAY OF FERRITIN: CPT

## 2020-11-30 PROCEDURE — 85027 COMPLETE CBC AUTOMATED: CPT

## 2020-11-30 PROCEDURE — 71045 X-RAY EXAM CHEST 1 VIEW: CPT

## 2020-11-30 PROCEDURE — 80053 COMPREHEN METABOLIC PANEL: CPT

## 2020-11-30 PROCEDURE — 80048 BASIC METABOLIC PNL TOTAL CA: CPT

## 2020-11-30 PROCEDURE — 85025 COMPLETE CBC W/AUTO DIFF WBC: CPT

## 2020-11-30 PROCEDURE — 82550 ASSAY OF CK (CPK): CPT

## 2020-11-30 PROCEDURE — P9016: CPT

## 2020-11-30 PROCEDURE — 82607 VITAMIN B-12: CPT

## 2020-11-30 PROCEDURE — 80162 ASSAY OF DIGOXIN TOTAL: CPT

## 2020-11-30 PROCEDURE — 83036 HEMOGLOBIN GLYCOSYLATED A1C: CPT

## 2020-11-30 PROCEDURE — 86900 BLOOD TYPING SEROLOGIC ABO: CPT

## 2020-11-30 PROCEDURE — 93306 TTE W/DOPPLER COMPLETE: CPT | Mod: 26

## 2020-11-30 PROCEDURE — 85610 PROTHROMBIN TIME: CPT

## 2020-11-30 PROCEDURE — 84466 ASSAY OF TRANSFERRIN: CPT

## 2020-11-30 PROCEDURE — 82962 GLUCOSE BLOOD TEST: CPT

## 2020-11-30 PROCEDURE — 86803 HEPATITIS C AB TEST: CPT

## 2020-11-30 PROCEDURE — 83550 IRON BINDING TEST: CPT

## 2020-11-30 PROCEDURE — 86901 BLOOD TYPING SEROLOGIC RH(D): CPT

## 2020-11-30 PROCEDURE — 86923 COMPATIBILITY TEST ELECTRIC: CPT

## 2020-11-30 PROCEDURE — 93005 ELECTROCARDIOGRAM TRACING: CPT

## 2020-11-30 PROCEDURE — 82272 OCCULT BLD FECES 1-3 TESTS: CPT

## 2020-11-30 PROCEDURE — 97161 PT EVAL LOW COMPLEX 20 MIN: CPT

## 2020-11-30 PROCEDURE — 99232 SBSQ HOSP IP/OBS MODERATE 35: CPT

## 2020-11-30 PROCEDURE — 99285 EMERGENCY DEPT VISIT HI MDM: CPT | Mod: 25

## 2020-11-30 PROCEDURE — 85730 THROMBOPLASTIN TIME PARTIAL: CPT

## 2020-11-30 PROCEDURE — 36430 TRANSFUSION BLD/BLD COMPNT: CPT

## 2020-11-30 PROCEDURE — 96374 THER/PROPH/DIAG INJ IV PUSH: CPT

## 2020-11-30 PROCEDURE — 84100 ASSAY OF PHOSPHORUS: CPT

## 2020-11-30 PROCEDURE — 74176 CT ABD & PELVIS W/O CONTRAST: CPT

## 2020-11-30 PROCEDURE — 82553 CREATINE MB FRACTION: CPT

## 2020-11-30 PROCEDURE — 85014 HEMATOCRIT: CPT

## 2020-11-30 PROCEDURE — 83735 ASSAY OF MAGNESIUM: CPT

## 2020-11-30 PROCEDURE — 86769 SARS-COV-2 COVID-19 ANTIBODY: CPT

## 2020-11-30 RX ORDER — MELOXICAM 15 MG/1
0 TABLET ORAL
Qty: 0 | Refills: 1 | DISCHARGE

## 2020-11-30 RX ORDER — LABETALOL HCL 100 MG
1 TABLET ORAL
Qty: 0 | Refills: 0 | DISCHARGE
Start: 2020-11-30 | End: 2020-12-29

## 2020-11-30 RX ORDER — HYDRALAZINE HCL 50 MG
1 TABLET ORAL
Qty: 90 | Refills: 0
Start: 2020-11-30 | End: 2020-12-29

## 2020-11-30 RX ORDER — LABETALOL HCL 100 MG
100 TABLET ORAL ONCE
Refills: 0 | Status: COMPLETED | OUTPATIENT
Start: 2020-11-30 | End: 2020-11-30

## 2020-11-30 RX ORDER — HYDRALAZINE HCL 50 MG
50 TABLET ORAL EVERY 8 HOURS
Refills: 0 | Status: DISCONTINUED | OUTPATIENT
Start: 2020-11-30 | End: 2020-11-30

## 2020-11-30 RX ORDER — LABETALOL HCL 100 MG
1 TABLET ORAL
Qty: 60 | Refills: 0
Start: 2020-11-30 | End: 2020-12-29

## 2020-11-30 RX ORDER — PANTOPRAZOLE SODIUM 20 MG/1
40 TABLET, DELAYED RELEASE ORAL DAILY
Refills: 0 | Status: DISCONTINUED | OUTPATIENT
Start: 2020-11-30 | End: 2020-11-30

## 2020-11-30 RX ORDER — SODIUM,POTASSIUM PHOSPHATES 278-250MG
1 POWDER IN PACKET (EA) ORAL ONCE
Refills: 0 | Status: COMPLETED | OUTPATIENT
Start: 2020-11-30 | End: 2020-11-30

## 2020-11-30 RX ORDER — AMLODIPINE BESYLATE 2.5 MG/1
1 TABLET ORAL
Qty: 0 | Refills: 0 | DISCHARGE
Start: 2020-11-30

## 2020-11-30 RX ORDER — HYDRALAZINE HCL 50 MG
25 TABLET ORAL ONCE
Refills: 0 | Status: COMPLETED | OUTPATIENT
Start: 2020-11-30 | End: 2020-11-30

## 2020-11-30 RX ORDER — HYDROCHLOROTHIAZIDE 25 MG
0 TABLET ORAL
Qty: 0 | Refills: 5 | DISCHARGE

## 2020-11-30 RX ORDER — AMLODIPINE BESYLATE 2.5 MG/1
0 TABLET ORAL
Qty: 0 | Refills: 11 | DISCHARGE

## 2020-11-30 RX ORDER — DIGOXIN 250 MCG
1 TABLET ORAL
Qty: 0 | Refills: 0 | DISCHARGE

## 2020-11-30 RX ORDER — LABETALOL HCL 100 MG
0 TABLET ORAL
Qty: 0 | Refills: 4 | DISCHARGE

## 2020-11-30 RX ORDER — CLOPIDOGREL BISULFATE 75 MG/1
0 TABLET, FILM COATED ORAL
Qty: 0 | Refills: 10 | DISCHARGE

## 2020-11-30 RX ORDER — AMLODIPINE BESYLATE 2.5 MG/1
10 TABLET ORAL ONCE
Refills: 0 | Status: COMPLETED | OUTPATIENT
Start: 2020-11-30 | End: 2020-11-30

## 2020-11-30 RX ADMIN — AMLODIPINE BESYLATE 10 MILLIGRAM(S): 2.5 TABLET ORAL at 08:14

## 2020-11-30 RX ADMIN — Medication 15 MILLIGRAM(S): at 12:42

## 2020-11-30 RX ADMIN — Medication 145 MILLIGRAM(S): at 12:42

## 2020-11-30 RX ADMIN — Medication 150 MILLIGRAM(S): at 12:42

## 2020-11-30 RX ADMIN — Medication 1 PACKET(S): at 12:48

## 2020-11-30 RX ADMIN — Medication 50 MILLIGRAM(S): at 13:51

## 2020-11-30 RX ADMIN — Medication 25 MILLIGRAM(S): at 00:53

## 2020-11-30 RX ADMIN — LAMOTRIGINE 100 MILLIGRAM(S): 25 TABLET, ORALLY DISINTEGRATING ORAL at 12:42

## 2020-11-30 RX ADMIN — Medication 100 MILLIGRAM(S): at 08:18

## 2020-11-30 RX ADMIN — Medication 25 MILLIGRAM(S): at 08:18

## 2020-11-30 RX ADMIN — PANTOPRAZOLE SODIUM 40 MILLIGRAM(S): 20 TABLET, DELAYED RELEASE ORAL at 12:48

## 2020-11-30 RX ADMIN — Medication 15 MILLIGRAM(S): at 08:17

## 2020-11-30 RX ADMIN — Medication 5000 UNIT(S): at 12:42

## 2020-11-30 NOTE — DISCHARGE NOTE PROVIDER - PROVIDER TOKENS
PROVIDER:[TOKEN:[[id number]],FOLLOWUP:[1 week]],PROVIDER:[TOKEN:[[id number]],FOLLOWUP:[1-3 days]],PROVIDER:[TOKEN:[[id number]]]

## 2020-11-30 NOTE — PROGRESS NOTE ADULT - ATTENDING COMMENTS
The patient was personally seen and examined, in addition to being examined and evaluated by NP.  All elements of the note were edited where appropriate.
I saw and examined the patient personally. Spoke with above provider regarding this case. I reviewed the above findings completely.  I agree with the above history, physical, and plan which I have edited where appropriate.
78M, SLE DM2, HTN, HL, CAD/stents, HCV, hx brain surgery 2013, hx diverticulosis; adm w/rectal bleeding, in ED, FOBT+, hgb 6.3, Cr 2.5, trop 0.077 - mgmt for GIB/acute blood loss anemia requiring transfusion, ARIELLA on CKD   -continue current mgt  -supportive transfusions prn - 3u so far during admit - hgb 6.3->->8.9 p 3u PRBCs - per Cards, maintain hgb ~9  -holding antiplatetet tx  -continued PPI gtt  -per GI - advance diet, colonoscopy as o/p  -elev trop - 0.07 on admit ->0.061 - per Cards, unlikey ACS - most likely demand ischemia in setting of anemia - TTE pending  -bradycardia - HR down to 40s in ED - improved HR p decr labetalol  -ARIELLA/CKD - likely 2/2 volume depletion w/acute bleed - Cr downtrending - baseline ~mid-1s  -HTN - continued labetalol; hydralazine added  -dvt prophy - SCDs in setting of active bleed
78M, SLE DM2, HTN, HL, CAD/stents, HCV, hx brain surgery 2013, hx diverticulosis; adm w/rectal bleeding, in ED, FOBT+, hgb 6.3, Cr 2.5 - mgmt for GIB/acute blood loss anemia requiring transfusion, ARIELLA on CKD   -continue current mgt  -trending hgb for further supportigve transfusion need - hgb 6.3->8.2 p 2u PRBCs  -GI following  -holding antiplatelet tx  -continue PPI gtt  -on modified diet - clear liquids  -ARIELLA likely 2/2 volume depletion in setting of active bleeding - Cr 2.5 on admit ->2.3 currently - renally dose meds, no nephrotoxics  -bradycardia - HR down to 40s - hold further labetalol doses for today; resume tomorrow at lowered dose 300bid->100bid  -dvt prophy - SCDs in setting of active bleed

## 2020-11-30 NOTE — PROGRESS NOTE ADULT - SUBJECTIVE AND OBJECTIVE BOX
Patient is a 75y old  Male who presents with a chief complaint of GI bleed (29 Nov 2020 10:03)  Patient seen in follow up for ARIELLA.      Stable renal indices.     PAST MEDICAL HISTORY:  Hyperlipidemia    Diverticulosis    CAD (coronary artery disease)    Anxiety and depression    Hepatitis C    Lupus    Diabetes mellitus    Hypertension      MEDICATIONS  (STANDING):  amLODIPine   Tablet 10 milliGRAM(s) Oral daily  atorvastatin 40 milliGRAM(s) Oral at bedtime  busPIRone 15 milliGRAM(s) Oral two times a day  cholecalciferol 5000 Unit(s) Oral daily  dextrose 40% Gel 15 Gram(s) Oral once  dextrose 5%. 1000 milliLiter(s) (50 mL/Hr) IV Continuous <Continuous>  dextrose 5%. 1000 milliLiter(s) (100 mL/Hr) IV Continuous <Continuous>  dextrose 50% Injectable 25 Gram(s) IV Push once  dextrose 50% Injectable 12.5 Gram(s) IV Push once  dextrose 50% Injectable 25 Gram(s) IV Push once  doxazosin 4 milliGRAM(s) Oral <User Schedule>  doxepin 25 milliGRAM(s) Oral at bedtime  fenofibrate Tablet 145 milliGRAM(s) Oral daily  glucagon  Injectable 1 milliGRAM(s) IntraMuscular once  hydrALAZINE 50 milliGRAM(s) Oral every 8 hours  influenza   Vaccine 0.5 milliLiter(s) IntraMuscular once  insulin lispro (ADMELOG) corrective regimen sliding scale   SubCutaneous three times a day before meals  insulin lispro (ADMELOG) corrective regimen sliding scale   SubCutaneous at bedtime  labetalol 100 milliGRAM(s) Oral two times a day  lamoTRIgine 100 milliGRAM(s) Oral daily  mirtazapine 30 milliGRAM(s) Oral at bedtime  oxybutynin 15 milliGRAM(s) Oral daily  pantoprazole    Tablet 40 milliGRAM(s) Oral daily  sodium chloride 0.45%. 1000 milliLiter(s) (50 mL/Hr) IV Continuous <Continuous>  venlafaxine XR. 150 milliGRAM(s) Oral daily    MEDICATIONS  (PRN):    T(C): 36.8 (11-30-20 @ 12:00), Max: 36.8 (11-28-20 @ 20:43)  HR: 62 (11-30-20 @ 12:00) (51 - 86)  BP: 168/83 (11-30-20 @ 12:00) (156/81 - 183/75)  RR: 18 (11-30-20 @ 12:00)  SpO2: 95% (11-30-20 @ 12:00)  Wt(kg): --  I&O's Detail    29 Nov 2020 07:01  -  30 Nov 2020 07:00  --------------------------------------------------------  IN:    Pantoprazole: 120 mL    sodium chloride 0.45%: 550 mL  Total IN: 670 mL    OUT:    Voided (mL): 400 mL  Total OUT: 400 mL    Total NET: 270 mL        PHYSICAL EXAM:  General: No distress  Respiratory: b/l air entry  Cardiovascular: S1 S2  Gastrointestinal: soft  Extremities:  no edema          LABORATORY:                        9.2    8.06  )-----------( 293      ( 30 Nov 2020 09:58 )             29.0     11-30    140  |  109<H>  |  12  ----------------------------<  161<H>  4.1   |  26  |  1.90<H>    Ca    8.6      30 Nov 2020 09:58  Phos  2.0     11-30  Mg     2.1     11-30    TPro  6.8  /  Alb  3.4  /  TBili  0.4  /  DBili  x   /  AST  23  /  ALT  25  /  AlkPhos  36<L>  11-30    Sodium, Serum: 140 mmol/L (11-30 @ 09:58)  Sodium, Serum: 144 mmol/L (11-29 @ 07:14)    Potassium, Serum: 4.1 mmol/L (11-30 @ 09:58)  Potassium, Serum: 3.3 mmol/L (11-29 @ 07:14)    Hemoglobin: 9.2 g/dL (11-30 @ 09:58)  Hemoglobin: 8.7 g/dL (11-29 @ 15:55)  Hemoglobin: 8.9 g/dL (11-29 @ 07:14)    Hemoglobin: 9.0 g/dL (11-28 @ 19:42)    Creatinine, Serum 1.90 (11-30 @ 09:58)  Creatinine, Serum 1.80 (11-29 @ 07:14)  Creatinine, Serum 2.30 (11-28 @ 05:52)  Creatinine, Serum 2.50 (11-27 @ 17:43)        LIVER FUNCTIONS - ( 30 Nov 2020 09:58 )  Alb: 3.4 g/dL / Pro: 6.8 g/dL / ALK PHOS: 36 U/L / ALT: 25 U/L / AST: 23 U/L / GGT: x

## 2020-11-30 NOTE — PROVIDER CONTACT NOTE (OTHER) - ASSESSMENT
patient  received awake and sitting on the side of bed. at this time patient seems apparently calm and will take cardiac medications

## 2020-11-30 NOTE — PROGRESS NOTE ADULT - ASSESSMENT
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 Admitted for gastrointestinal hemorrhage.
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 Spoke to PMD Dr. Man on admission  Patient stopped following up with old cardiologist so plavix and digoxin were just continued. Cardiology consult called for Elevated troponins     Elevated troponins/chest pain  - Abnormal cardiac biomarkers not consistent with ACS, more likely from demand for acute anemia  - Trop peak @ 0.77 now down trending no need to trend further  - EKG showed sinus pelon @ 54 with 1st degree AVB, incomplete RBBB ST depressions V2-V6  - Given cardiac Hx and risk factors, will need eventual outpt ischemic eval, when acute bleed has subsided and if in line with patient's wishes    CAD S/P stent  - EKG as above, though, no ischemic symptoms  - Plavix on held for acute anemia, possibly from diverticular bleed?  - Continue BB and statin  - He has stopped following up with a cardiologist years ago.  He now agrees to follow up with us  -Monitor and replete lytes, keep K>4 and Mg >2    Cardiac Murmur  - He does have a significant systolic murmur that he is unaware of  - He is not sure rationale of some of his meds  - F/u echo to assess for structural cardiac disease.   - Denies hx of arrythmia or heart failure but he does not seem like a reliable historian   - Hold Digoxin    HTN  - Remains uncontrolled with systolic up to 170  - Continue Norvasc and Labetalol.  Unable to increase Labetalol in the setting of bradycardia  - Start Hydralazine  - monitor routine hemodynamics.    GI bleed  - S/P PRBC x2.  Hgb remains stable.  Continue to trend and transfuse to keep Hgb>8  - Awaiting GI recs  - On PPI gtt.  Other care per Primary    ARIELLA vs CKD  - Unknown baseline on creatinine  - Presented with creatinine of 2.5.  Trended down to 1.8 after IV hydration  - Avoid nephrotoxics  - Renal following, follow recs    HLD  - Continue statin, fenofibrate    DVT ppx  - Per Primary    - Further cardiac workup will depend on clinical course.   - All other workup per primary  - Will continue to follow    Shilpa Kirkpatrick DNP, NP-C  Cardiology   Spectra #[phone]    
ARIELLA, CKD 3: Prerenal azotemia  Anemia, GI Bleed  Hypertension  Diabetes    Improving renal indices. Hold diuretics, Metformin. Continue IV hydration. Monitor h/h trend. Transfuse PRN.   On IV iron and retacrit. Monitor BP trend. Titrate BP meds as needed. Salt restriction. Avoid nephrotoxic meds as possible.   Avoid ACEI, ARB, NSAIDs and IV contrast. Monitor blood sugar levels. Insulin coverage as needed. Will follow electrolytes and renal function trend.  GI follow up.
ARIELLA, CKD 3: Prerenal azotemia  Anemia, GI Bleed  Hypertension  Diabetes    Stable renal indices. To continue current meds. Monitor h/h trend. Transfuse PRN. On IV iron and retacrit.   Monitor BP trend. Titrate BP meds as needed. Salt restriction. Avoid nephrotoxic meds as possible.   Avoid ACEI, ARB, NSAIDs and IV contrast. Monitor blood sugar levels. Insulin coverage as needed.   Will follow electrolytes and renal function trend. D/w patient regarding need for out patient nephrology follow up.
rectal bleeding  acute blood loss anemia    no further episodes of bleeding  hgb stable  suspect resolving diverticular bleeding  cbc daily  ok for regular diet   outpatient colonoscopy  may resume plavix in 1 week post discharge    Advanced care planning was discussed with patient and family.  Advanced care planning forms were reviewed and discussed.  Risks, benefits and alternatives of gastroenterologic procedures were discussed in detail and all questions were answered.    30 minutes spent.  
rectal bleeding  acute blood loss anemia    no further episodes of bleeding  hgb stable  suspect resolving diverticular bleeding  cbc daily  ok for regular diet   outpatient colonoscopy  may resume plavix in 1 week post discharge    Advanced care planning was discussed with patient and family.  Advanced care planning forms were reviewed and discussed.  Risks, benefits and alternatives of gastroenterologic procedures were discussed in detail and all questions were answered.    30 minutes spent.  
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 Admitted for gastrointestinal hemorrhage.
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 Spoke to PMD Dr. Man on admission  Patient stopped following up with old cardiologist so plavix and digoxin were just continued. Cardiology consult called for Elevated troponins     Elevated troponins/chest pain  - Abnormal cardiac biomarkers not consistent with ACS, more likely from demand for acute anemia  - Trop peak @ 0.77 which downtrended, no need to trend further  - EKG showed sinus pelon @ 54 with 1st degree AVB, incomplete RBBB ST depressions V2-V6  - Given cardiac Hx and risk factors, will need eventual outpt ischemic eval, when acute bleed has subsided and if in line with patient's wishes  - Will follow with Dr. Posey    CAD S/P stent  - EKG as above, though, no ischemic symptoms  - Plavix on held for acute anemia, possibly from diverticular bleed?  To resume in 1 week per GI  - Continue BB and statin  - He has stopped following up with a cardiologist years ago.  He now agrees to follow up with us  - Monitor and replete lytes, keep K>4 and Mg >2    Cardiac Murmur  - He does have a significant systolic murmur that he was unaware of  - He is not sure rationale of some of his meds  - F/u echo to assess for structural cardiac disease.   - Denies hx of arrythmia or heart failure but he does not seem like a reliable historian   - Hold Digoxin    HTN  - Remains uncontrolled with systolic up to 170.  Refuses BP at times  - Continue Norvasc and Labetalol.  Unable to increase Labetalol in the setting of bradycardia  - Continue Hydralazine.  Increase as needed for BP control  - Monitor routine hemodynamics.    GI bleed  - S/P PRBC x2.  Hgb remains stable.  Continue to trend and transfuse to keep Hgb>8  - GI eval noted.  No endoscopic procedure planned per GI.  Recommending to resume Plavix in 1 week    ARIELLA vs CKD  - Unknown baseline on creatinine  - Presented with creatinine of 2.5.  Continue to trend, today's = 1.9  - Avoid nephrotoxics  - Renal following, follow recs    HLD  - Continue statin, fenofibrate    DVT ppx  - Per Primary    - Further cardiac workup will depend on clinical course.   - All other workup per primary  - Will continue to follow    Shilpa Kirkpatrick DNP, NP-C  Cardiology   Spectra #[phone]

## 2020-11-30 NOTE — PROVIDER CONTACT NOTE (OTHER) - DATE AND TIME:
Problem: SLP Goal  Goal: SLP ADIA Francis., [facility]-SLP  Speech-Language Pathology  Pager: [phone]       30-Nov-2020 08:00

## 2020-11-30 NOTE — DISCHARGE NOTE NURSING/CASE MANAGEMENT/SOCIAL WORK - PATIENT PORTAL LINK FT
You can access the FollowMyHealth Patient Portal offered by [facility] by registering at the following website: [email or web address]. By joining [employer]’s FollowMyHealth portal, you will also be able to view your health information using other applications (apps) compatible with our system.

## 2020-11-30 NOTE — PROGRESS NOTE ADULT - SUBJECTIVE AND OBJECTIVE BOX
[facility]  Radu Weber PA-C  [address] [place] [address]  [phone]      INTERVAL HPI/OVERNIGHT EVENTS:    no new events  no rectal bleeding      MEDICATIONS  (STANDING):  amLODIPine   Tablet 10 milliGRAM(s) Oral daily  atorvastatin 40 milliGRAM(s) Oral at bedtime  busPIRone 15 milliGRAM(s) Oral two times a day  cholecalciferol 5000 Unit(s) Oral daily  dextrose 40% Gel 15 Gram(s) Oral once  dextrose 5%. 1000 milliLiter(s) (50 mL/Hr) IV Continuous <Continuous>  dextrose 5%. 1000 milliLiter(s) (100 mL/Hr) IV Continuous <Continuous>  dextrose 50% Injectable 25 Gram(s) IV Push once  dextrose 50% Injectable 12.5 Gram(s) IV Push once  dextrose 50% Injectable 25 Gram(s) IV Push once  doxazosin 4 milliGRAM(s) Oral <User Schedule>  doxepin 25 milliGRAM(s) Oral at bedtime  epoetin michael-epbx (RETACRIT) Injectable 71091 Unit(s) SubCutaneous once  fenofibrate Tablet 145 milliGRAM(s) Oral daily  glucagon  Injectable 1 milliGRAM(s) IntraMuscular once  hydrALAZINE 25 milliGRAM(s) Oral every 8 hours  influenza   Vaccine 0.5 milliLiter(s) IntraMuscular once  insulin lispro (ADMELOG) corrective regimen sliding scale   SubCutaneous three times a day before meals  insulin lispro (ADMELOG) corrective regimen sliding scale   SubCutaneous at bedtime  iron sucrose Injectable 100 milliGRAM(s) IV Push <User Schedule>  labetalol 100 milliGRAM(s) Oral two times a day  lamoTRIgine 100 milliGRAM(s) Oral daily  mirtazapine 30 milliGRAM(s) Oral at bedtime  oxybutynin 15 milliGRAM(s) Oral daily  pantoprazole Infusion 8 mG/Hr (10 mL/Hr) IV Continuous <Continuous>  potassium chloride    Tablet ER 40 milliEquivalent(s) Oral once  sodium chloride 0.45%. 1000 milliLiter(s) (50 mL/Hr) IV Continuous <Continuous>  venlafaxine XR. 150 milliGRAM(s) Oral daily    MEDICATIONS  (PRN):      Allergies    No Known Allergies    Intolerances        ROS:   General:  No wt loss, fevers, chills, night sweats, fatigue,   Eyes:  Good vision, no reported pain  ENT:  No sore throat, pain, runny nose, dysphagia  CV:  No pain, palpitations, hypo/hypertension  Resp:  No dyspnea, cough, tachypnea, wheezing  GI:  No pain, No nausea, No vomiting, No diarrhea, No constipation, No weight loss, No fever, No pruritis, No rectal bleeding, No tarry stools, No dysphagia,  :  No pain, bleeding, incontinence, nocturia  Muscle:  No pain, weakness  Neuro:  No weakness, tingling, memory problems  Psych:  No fatigue, insomnia, mood problems, depression  Endocrine:  No polyuria, polydipsia, cold/heat intolerance  Heme:  No petechiae, ecchymosis, easy bruisability  Skin:  No rash, tattoos, scars, edema      PHYSICAL EXAM:   Vital Signs:  Vital Signs Last 24 Hrs  T(C): 36.6 (29 Nov 2020 12:14), Max: 36.8 (28 Nov 2020 20:43)  T(F): 97.9 (29 Nov 2020 12:14), Max: 98.3 (28 Nov 2020 20:43)  HR: 68 (29 Nov 2020 12:14) (51 - 86)  BP: 168/80 (29 Nov 2020 12:13) (156/81 - 179/77)  BP(mean): --  RR: 17 (29 Nov 2020 12:14) (16 - 20)  SpO2: 94% (29 Nov 2020 12:14) (92% - 96%)  Daily     Daily     GENERAL:  Appears stated age, well-groomed, well-nourished, no distress  HEENT:  NC/AT,  conjunctivae clear and pink, no thyromegaly, nodules, adenopathy, no JVD, sclera -anicteric  CHEST:  Full & symmetric excursion, no increased effort, breath sounds clear  HEART:  Regular rhythm, S1, S2, no murmur/rub/S3/S4, no abdominal bruit, no edema  ABDOMEN:  Soft, non-tender, non-distended, normoactive bowel sounds,  no masses ,no hepato-splenomegaly, no signs of chronic liver disease  EXTEREMITIES:  no cyanosis,clubbing or edema  SKIN:  No rash/erythema/ecchymoses/petechiae/wounds/abscess/warm/dry  NEURO:  Alert, oriented, no asterixis, no tremor, no encephalopathy      LABS:                        8.9    7.22  )-----------( 297      ( 29 Nov 2020 07:14 )             28.0     11-29    144  |  110<H>  |  16  ----------------------------<  97  3.3<L>   |  22  |  1.80<H>    Ca    8.6      29 Nov 2020 07:14  Phos  2.4     11-29  Mg     1.9     11-28    TPro  6.5  /  Alb  3.4  /  TBili  0.5  /  DBili  x   /  AST  22  /  ALT  22  /  AlkPhos  31<L>  11-28    PT/INR - ( 28 Nov 2020 05:52 )   PT: 13.3 sec;   INR: 1.14 ratio         PTT - ( 28 Nov 2020 05:52 )  PTT:32.0 sec      RADIOLOGY & ADDITIONAL TESTS:

## 2020-11-30 NOTE — PHYSICAL THERAPY INITIAL EVALUATION ADULT - GENERAL OBSERVATIONS, REHAB EVAL
Patient received ambulating in room; cooperative with physical therapy evaluation , without adverse effects.

## 2020-11-30 NOTE — CHART NOTE - NSCHARTNOTEFT_GEN_A_CORE
Called by RN for pt with /80 HR 54. Patient is asymptomatic.       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     - Patient with uncontrolled HTN, started on hydralazine today. Due for amlodipine 10mg, hydrazlazine 25mg po, labetalol 100mg at 6am. Patient currently asymptomatic.   - will give 25mg po hydralazine x1 for hypertension in the setting of bradycardia.   - will consider additional 25mg hydralazine if remains hypertensive  - continue to monitor  - RN to notify of any changes

## 2020-11-30 NOTE — DISCHARGE NOTE PROVIDER - HOSPITAL COURSE
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 Patient describes the blood as dark red and occurs 2-3 times per day. Blood is not associated with bowel movements or mixed in stool, patient says bleeding just happens randomly. Denies n/v, abdominal pain or cramping. Endorses decreased appetite for past 1-2 days and some weight loss. Denies trauma or inciting event. Has been taking meloxicam prescribed by Psych NP Domingo Ventura. Patient last saw his PMD Dr. Man about 4 weeks ago. Of note, patient states for past 3 days he has had right side chest pain described as sharp, and today had left side chest pain around his shoulder described as dull. No precipitating factors identified, pain occurred at rest. Denies fever, SOB, dysuria, weakness. Spoke to PMD Dr. Man on admission - he states patient has history of malignant HTN, no known CHF or atrial fibrillation. Patient stopped following up with old cardiologist so plavix and digoxin were just continued. His last bloodwork for the patient revealed an BUN/Cr of 25/1.6 and H/H of 11.5/35. States that the meloxicam was prescribed from his end months ago and saw voltaren gel on his list. Known iron deficiency anemia and he was recommended for a colonoscopy per PMD, but patient does not recall this recommendation. Per patient, his last colonoscopy was 5 years ago and was "clean" but has known diverticulosis. No other complaints on admission.    In the ED,  Vital Signs T(F): 98.4 HR: 58 BP: 176/84 RR: 16 SpO2: 99%   EKG: sinus bradycardia HR 54 with 1st degree AV block. Incomplete RBBB. LVH. TWI V3-V6.  Labs: H/H 6.7/21, PT/PTT/INR normal, Bun/Cr 32/2.50, FOBT positive  CXR: no acute infiltrate or effusion on personal read  CT a/p: Extensive sigmoid diverticulosis without diverticulitis.  Received: 2U PRBCs, Protonix 40mg IV, DTaP vaccine (27 Nov 2020 20:21)      ---  HOSPITAL COURSE:   The patient presented to the ED with rectal bleeding for 3 days. The patient was admitted for gastrointestinal hemorrhage. Hemoglobin was noted to be 6.7 on admission. CT a/p was done and showed extensive sigmoid diverticulosis without diverticulitis. The patient received 3U PRBCs with appropriated response. Home meloxicam and plavix was held. Gastroenterology was consulted and it was determine that the patient will undergo colonoscopy in the outpatient setting. Patient was noted to have right sided sharp CP x3 days and L side dull CP for 1 day which resolved. resolved. No known hx heart failure. Cardiology was consulted. EKG showed SR with 1st deg AV block RBBB twi V3-V6. TTE was done. It was determined abnormal cardiac biomarkers were not consistent with ACS, more likely from demand for acute anemia. the patient was also noted to have ARIELLA on admission. Nephrology was consulted and nephrotoxic mediations were held.          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 Patient describes the blood as dark red and occurs 2-3 times per day. Blood is not associated with bowel movements or mixed in stool, patient says bleeding just happens randomly. Denies n/v, abdominal pain or cramping. Endorses decreased appetite for past 1-2 days and some weight loss. Denies trauma or inciting event. Has been taking meloxicam prescribed by Psych NP Domingo Ventura. Patient last saw his PMD Dr. Man about 4 weeks ago. Of note, patient states for past 3 days he has had right side chest pain described as sharp, and today had left side chest pain around his shoulder described as dull. No precipitating factors identified, pain occurred at rest. Denies fever, SOB, dysuria, weakness. Spoke to PMD Dr. Man on admission - he states patient has history of malignant HTN, no known CHF or atrial fibrillation. Patient stopped following up with old cardiologist so plavix and digoxin were just continued. His last bloodwork for the patient revealed an BUN/Cr of 25/1.6 and H/H of 11.5/35. States that the meloxicam was prescribed from his end months ago and saw voltaren gel on his list. Known iron deficiency anemia and he was recommended for a colonoscopy per PMD, but patient does not recall this recommendation. Per patient, his last colonoscopy was 5 years ago and was "clean" but has known diverticulosis. No other complaints on admission.    In the ED,  Vital Signs T(F): 98.4 HR: 58 BP: 176/84 RR: 16 SpO2: 99%   EKG: sinus bradycardia HR 54 with 1st degree AV block. Incomplete RBBB. LVH. TWI V3-V6.  Labs: H/H 6.7/21, PT/PTT/INR normal, Bun/Cr 32/2.50, FOBT positive  CXR: no acute infiltrate or effusion on personal read  CT a/p: Extensive sigmoid diverticulosis without diverticulitis.  Received: 2U PRBCs, Protonix 40mg IV, DTaP vaccine (27 Nov 2020 20:21)      ---  HOSPITAL COURSE:   The patient presented to the ED with rectal bleeding for 3 days. The patient was admitted for gastrointestinal hemorrhage. Hemoglobin was noted to be 6.7 on admission. CT a/p was done and showed extensive sigmoid diverticulosis without diverticulitis. The patient received 3U PRBCs with appropriated response. Home meloxicam and plavix was held. Gastroenterology was consulted and it was determine that the patient will undergo colonoscopy in the outpatient setting. Patient was noted to have right sided sharp CP x3 days and L side dull CP for 1 day which resolved. resolved. No known hx heart failure. Cardiology was consulted. EKG showed SR with 1st deg AV block RBBB twi V3-V6. TTE was done. It was determined abnormal cardiac biomarkers were not consistent with ACS, more likely from demand for acute anemia. the patient was also noted to have ARIELLA on admission. Nephrology was consulted and nephrotoxic mediations were held and renal function improved throughout hospital course.    Problem: Diabetes mellitus. Plan: Non-insulin dependent T2DM. Home meds include metformin 500mg BID.  -hold home oral metformin  -start low dose insulin corrective scale while NPO  -monitor blood glucose  -hypoglycemia protocol  -HbA1C 6  -B12 and folate levels wnl  -peripheral neuropathy noted on exam - pt to f/u with podiatry OP.     Problem/Plan - 7:  ·  Problem: Hypertension.  Plan: Chronic, patient with history of malignant HTN per PMD. Home meds include amlodipine 10mg, HCTZ 12.5mg, labetalol 300mg BID  -continue BP meds with hold parameters except HCTZ given ARIELLA  -monitor routine hemodynamics.      Problem/Plan - 8:  ·  Problem: Hyperlipidemia.  Plan: Chronic. Home meds include crestor 10mg, fenofibrate 145mg, vascepa 1g BID.  -continue statin, fenofibrate, vascepa.      Problem/Plan - 9:  ·  Problem: Anxiety and depression.  Plan: Chronic. Home meds include aripiprazole 400mg monthly injections, lamictal 100mg, remeron 30mg, effexor 150mg, buspar 15mg BID.  -continue lamictal, remeron, effexor, buspar.      Problem/Plan - 10:  Problem: Need for prophylactic measure. Plan; SCDs given current GI bleed.  IMPROVE VTE Score:         [    1     ]      11. Hepatitis C  -f/u AM Hep C panel    12. Lupus  -chronic, no active meds  -hold home NSAIDs in setting of bleed.             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 Patient describes the blood as dark red and occurs 2-3 times per day. Blood is not associated with bowel movements or mixed in stool, patient says bleeding just happens randomly. Denies n/v, abdominal pain or cramping. Endorses decreased appetite for past 1-2 days and some weight loss. Denies trauma or inciting event. Has been taking meloxicam prescribed by Psych NP Domingo Ventura. Patient last saw his PMD Dr. Man about 4 weeks ago. Of note, patient states for past 3 days he has had right side chest pain described as sharp, and today had left side chest pain around his shoulder described as dull. No precipitating factors identified, pain occurred at rest. Denies fever, SOB, dysuria, weakness. Spoke to PMD Dr. Man on admission - he states patient has history of malignant HTN, no known CHF or atrial fibrillation. Patient stopped following up with old cardiologist so plavix and digoxin were just continued. His last bloodwork for the patient revealed an BUN/Cr of 25/1.6 and H/H of 11.5/35. States that the meloxicam was prescribed from his end months ago and saw voltaren gel on his list. Known iron deficiency anemia and he was recommended for a colonoscopy per PMD, but patient does not recall this recommendation. Per patient, his last colonoscopy was 5 years ago and was "clean" but has known diverticulosis. No other complaints on admission.    In the ED,  Vital Signs T(F): 98.4 HR: 58 BP: 176/84 RR: 16 SpO2: 99%   EKG: sinus bradycardia HR 54 with 1st degree AV block. Incomplete RBBB. LVH. TWI V3-V6.  Labs: H/H 6.7/21, PT/PTT/INR normal, Bun/Cr 32/2.50, FOBT positive  CXR: no acute infiltrate or effusion on personal read  CT a/p: Extensive sigmoid diverticulosis without diverticulitis.  Received: 2U PRBCs, Protonix 40mg IV, DTaP vaccine (27 Nov 2020 20:21)      ---  HOSPITAL COURSE:   The patient presented to the ED with rectal bleeding for 3 days. The patient was admitted for gastrointestinal hemorrhage. Hemoglobin was noted to be 6.7 on admission. CT a/p was done and showed extensive sigmoid diverticulosis without diverticulitis. The patient received 3U PRBCs with appropriated response. Home meloxicam and plavix was held. Gastroenterology was consulted and it was determine that the patient will undergo colonoscopy in the outpatient setting. Patient was noted to have right sided sharp CP x3 days and L side dull CP for 1 day which resolved. resolved. No known hx heart failure. Cardiology was consulted. EKG showed SR with 1st deg AV block RBBB twi V3-V6. TTE was done. It was determined abnormal cardiac biomarkers were not consistent with ACS, more likely from demand for acute anemia. the patient was also noted to have ARIELLA on admission. Nephrology was consulted and nephrotoxic mediations were held and renal function improved throughout hospital course. The patient was seen and examined on the day of discharge by the attending physician. Pt stable for discharge. Please see discharge note for additional information regarding the hospital course and the day of discharge.       ---  CONSULTANTS:   Cardiology: Dr. Bliss's group  GI: Dr. Bah  Nephro: Dr. 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 Patient describes the blood as dark red and occurs 2-3 times per day. Blood is not associated with bowel movements or mixed in stool, patient says bleeding just happens randomly. Denies n/v, abdominal pain or cramping. Endorses decreased appetite for past 1-2 days and some weight loss. Denies trauma or inciting event. Has been taking meloxicam prescribed by Psych NP Domingo Ventura. Patient last saw his PMD Dr. Man about 4 weeks ago. Of note, patient states for past 3 days he has had right side chest pain described as sharp, and today had left side chest pain around his shoulder described as dull. No precipitating factors identified, pain occurred at rest. Denies fever, SOB, dysuria, weakness. Spoke to PMD Dr. Man on admission - he states patient has history of malignant HTN, no known CHF or atrial fibrillation. Patient stopped following up with old cardiologist so plavix and digoxin were just continued. His last bloodwork for the patient revealed an BUN/Cr of 25/1.6 and H/H of 11.5/35. States that the meloxicam was prescribed from his end months ago and saw voltaren gel on his list. Known iron deficiency anemia and he was recommended for a colonoscopy per PMD, but patient does not recall this recommendation. Per patient, his last colonoscopy was 5 years ago and was "clean" but has known diverticulosis. No other complaints on admission.    In the ED,  Vital Signs T(F): 98.4 HR: 58 BP: 176/84 RR: 16 SpO2: 99%   EKG: sinus bradycardia HR 54 with 1st degree AV block. Incomplete RBBB. LVH. TWI V3-V6.  Labs: H/H 6.7/21, PT/PTT/INR normal, Bun/Cr 32/2.50, FOBT positive  CXR: no acute infiltrate or effusion on personal read  CT a/p: Extensive sigmoid diverticulosis without diverticulitis.  Received: 2U PRBCs, Protonix 40mg IV, DTaP vaccine (27 Nov 2020 20:21)      ---  HOSPITAL COURSE:   The patient presented to the ED with rectal bleeding for 3 days. The patient was admitted for gastrointestinal hemorrhage. Hemoglobin was noted to be 6.7 on admission. CT a/p was done and showed extensive sigmoid diverticulosis without diverticulitis. The patient received 3U PRBCs with appropriated response. Home meloxicam and plavix was held. Gastroenterology was consulted and it was determine that the patient will undergo colonoscopy in the outpatient setting. Patient was noted to have right sided sharp CP x3 days and L side dull CP for 1 day which resolved. resolved. No known hx heart failure. Cardiology was consulted. EKG showed SR with 1st deg AV block RBBB twi V3-V6. TTE was done. It was determined abnormal cardiac biomarkers were not consistent with ACS, more likely from demand for acute anemia. the patient was also noted to have ARIELLA on admission. Nephrology was consulted and nephrotoxic mediations were held and renal function improved throughout hospital course. The patient was seen and examined on the day of discharge by the attending physician.       ---  CONSULTANTS:   Cardiology: Dr. Bliss's group  GI: Dr. Bah  Nephro: Dr. Rodarte    ---  TIME SPENT:  The total amount of time spent reviewing the hospital notes, laboratory values, imaging findings, assessing/counseling the patient, discussing with consultant physicians, social work, nursing staff was 39 minutes by attending physician

## 2020-11-30 NOTE — DISCHARGE NOTE PROVIDER - CARE PROVIDER_API CALL
Erich Man)  Medicine  [address] [place] [address]  Phone: [phone]  Fax: [phone]  Follow Up Time: 1 week    Jeffry Crockett ()  Internal Medicine  [address] [place] [address]  Phone: [phone]  Fax: [phone]  Follow Up Time: 1-3 days    Abdirizak Burciaga)  Medicine  [address] [place] [address], [address] [place] [address]  Phone: [phone]  Fax: [phone]  Follow Up Time:

## 2020-11-30 NOTE — DISCHARGE NOTE PROVIDER - NSDCCPCAREPLAN_GEN_ALL_CORE_FT
PRINCIPAL DISCHARGE DIAGNOSIS  Diagnosis: Rectal bleed  Assessment and Plan of Treatment: You recieved 3 units of packed red blood cells while in the hospital.  - You are to continue to take your omeprazole at home.  - You are to STOP your meloxicam.  - You are NOT to RESUME your Plavix UNTIL 12/7/20  - Follow up with your primary care doctor within 3 days of discharge.  - Follow up with your gastroenterologist to schedule an out patient colonoscopy.      SECONDARY DISCHARGE DIAGNOSES  Diagnosis: ARIELLA (acute kidney injury)  Assessment and Plan of Treatment: You were noted to have elevated kidney markers when admitted. Your kidney function improved throughout your hospital course.    Diagnosis: Hypertension  Assessment and Plan of Treatment: You blood pressure was elevated while in the hospital.  - You are to STOP your home medication hydrochlorothiazide.   - You are to START Hydralizine as prescribed.  - Continue to take your home labetolol.  - Follow up with you rcardiologist within 1 weekof discharge from the hospital.     PRINCIPAL DISCHARGE DIAGNOSIS  Diagnosis: Rectal bleed  Assessment and Plan of Treatment: You recieved 3 units of packed red blood cells while in the hospital.  - You are to continue to take your omeprazole at home.  - You are to STOP your meloxicam.  - You are NOT to RESUME your Plavix UNTIL 12/7/20  - Follow up with your primary care doctor within 3 days of discharge.  - Follow up with your gastroenterologist to schedule an out patient colonoscopy.      SECONDARY DISCHARGE DIAGNOSES  Diagnosis: ARIELLA (acute kidney injury)  Assessment and Plan of Treatment: You were noted to have elevated kidney markers when admitted. Your kidney function improved throughout your hospital course and was at your baseline on discharge    Diagnosis: Hypertension  Assessment and Plan of Treatment: You blood pressure was elevated while in the hospital.  - You are to STOP your home medication hydrochlorothiazide.   - You are to START Hydralizine as prescribed.  - Continue to take labetalol (dose decreased from 300mg two times a day to 100mg two times a day).  - Follow up with you rcardiologist within 1 weekof discharge from the hospital.

## 2020-11-30 NOTE — PHYSICAL THERAPY INITIAL EVALUATION ADULT - ADDITIONAL COMMENTS
Patient lives in group home, [address] with railing.  Patient was independent in all ADLs and ambulated without a device.

## 2020-11-30 NOTE — PROGRESS NOTE ADULT - SUBJECTIVE AND OBJECTIVE BOX
Patient seen and examined at bedside on day of discharge. Patient states he feels well. Overnight refusing medications, but more cooperative this AM. Denies any CP, SOB, abd pain. States he had BM yesterday- no blood noted. Eager to go home. Patient's BP stable. Discussed with Cardio, Nephrology- medications adjusted (patient to stop home HCTZ, started on hydralazine, home labetalol dose decreased).    Physical Exam:  GENERAL: NAD, flat affect  HEENT:  anicteric, moist mucous membranes  CHEST/LUNG:  CTA b/l, no rales, wheezes, or rhonchi  HEART:  RRR, +S1/ S2  ABDOMEN:  BS+, soft, NT, nondistended, +obese  EXTREMITIES: no edema, cyanosis, or calf tenderness  NERVOUS SYSTEM: answers questions and follows commands appropriately    Please refer to updated D/C note for further details.

## 2020-11-30 NOTE — DISCHARGE NOTE PROVIDER - NSDCMRMEDTOKEN_GEN_ALL_CORE_FT
AMLODIPINE 10MG TAB: TAKE ONE TABLET DAILY (FOR BLOOD PRESSURE)  ARISTADA     INJ 1064MG: INJECT 3.9ML INTRAMUSCULARLY AS DIRECTED  BUSPIRONE 15MG TAB: TAKE 1 TABLET TWICE A DAY  CLOPIDOGREL  TAB 75MG: TAKE ONE TABLET DAILY (BLOOD THINNER)  digoxin 250 mcg (0.25 mg) oral tablet: 1 tab(s) orally once a day  DOXAZOSIN 4MG TAB: TAKE 1 TABLET TWICE A DAY (FOR PROSTATE)  DOXEPIN 25MG CAP: TAKE ONE CAPSULE AT BEDTIME  FENOFIBRATE  TAB 145MG: TAKE ONE TABLET DAILY (FOR CHOLESTEROL)  HYDROCHLOROT 12.5MG CAP: TAKE 1 CAPSULE DAILY (WATER PILL)  LABETALOL 300MG TAB: TAKE 1 TABLET TWICE A DAY (FOR BLOOD PRESSURE HEART)  LAMOTRIGINE 100MG TA: TAKE ONE TABLET DAILY  MELOXICAM 7.5MG TAB: TAKE 1 TABLET TWICE A DAY AS NEEDED FOR PAIN  METFORMIN 500MG TAB: TAKE 1 TABLET TWICE A DAY (FOR DIABETES)  MIRTAZAPINE  TAB 30MG: TAKE 1 TABLET AT BEDTIME  OMEPRAZOLE 40MG CAP: TAKE 1 CAPSULE DAILY (FOR STOMACH)  OXYBUTYNIN ER 15MG TAB: TAKE ONE TABLET DAILY  ROSUVASTATIN TAB 10MG: TAKE ONE TABLET DAILY (FOR CHOLESTEROL)  VASCEPA 1GM CAP: TAKE 1 CAPSULE TWICE A DAY  VENLAFAXINE XR 150MG CAPS: TAKE 1 CAPSULE DAILY  VITAMIN D 1000UNIT (M) TAB: TAKE 5 TABLETS DAILY   amLODIPine 10 mg oral tablet: 1 tab(s) orally once a day  ARISTADA     INJ 1064MG: INJECT 3.9ML INTRAMUSCULARLY AS DIRECTED  BUSPIRONE 15MG TAB: TAKE 1 TABLET TWICE A DAY  DOXAZOSIN 4MG TAB: TAKE 1 TABLET TWICE A DAY (FOR PROSTATE)  DOXEPIN 25MG CAP: TAKE ONE CAPSULE AT BEDTIME  FENOFIBRATE  TAB 145MG: TAKE ONE TABLET DAILY (FOR CHOLESTEROL)  hydrALAZINE 50 mg oral tablet: 1 tab(s) orally every 8 hours  labetalol 100 mg oral tablet: 1 tab(s) orally 2 times a day  LAMOTRIGINE 100MG TA: TAKE ONE TABLET DAILY  METFORMIN 500MG TAB: TAKE 1 TABLET TWICE A DAY (FOR DIABETES)  MIRTAZAPINE  TAB 30MG: TAKE 1 TABLET AT BEDTIME  OMEPRAZOLE 40MG CAP: TAKE 1 CAPSULE DAILY (FOR STOMACH)  OXYBUTYNIN ER 15MG TAB: TAKE ONE TABLET DAILY  ROSUVASTATIN TAB 10MG: TAKE ONE TABLET DAILY (FOR CHOLESTEROL)  VASCEPA 1GM CAP: TAKE 1 CAPSULE TWICE A DAY  VENLAFAXINE XR 150MG CAPS: TAKE 1 CAPSULE DAILY  VITAMIN D 1000UNIT (M) TAB: TAKE 5 TABLETS DAILY

## 2020-11-30 NOTE — PROGRESS NOTE ADULT - SUBJECTIVE AND OBJECTIVE BOX
[facility] -- Fifi Bliss, Kalpesh Valdovinos, Abraham Sheridan Savella, [place]  Office # [phone]    Follow Up:  Elevated Troponins and cardiac murmur    Subjective/Observations:     REVIEW OF SYSTEMS: All other review of systems is negative unless indicated above  PAST MEDICAL & SURGICAL HISTORY:  Hyperlipidemia    Diverticulosis    CAD (coronary artery disease)  s/p stents    Anxiety and depression    Hepatitis C    Lupus    Diabetes mellitus    Hypertension    H/O hernia repair    S/P cataract surgery  Bilateral    Pilonidal cyst  Had surgery ( 1969 )    Torsion of testicle  Had surgery at age 14    S/P arthroscopic knee surgery  Bilateral ( 2005 )    S/P tonsillectomy    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  Had surgery ( April 2013 )      MEDICATIONS  (STANDING):  amLODIPine   Tablet 10 milliGRAM(s) Oral daily  atorvastatin 40 milliGRAM(s) Oral at bedtime  busPIRone 15 milliGRAM(s) Oral two times a day  cholecalciferol 5000 Unit(s) Oral daily  dextrose 40% Gel 15 Gram(s) Oral once  dextrose 5%. 1000 milliLiter(s) (50 mL/Hr) IV Continuous <Continuous>  dextrose 5%. 1000 milliLiter(s) (100 mL/Hr) IV Continuous <Continuous>  dextrose 50% Injectable 25 Gram(s) IV Push once  dextrose 50% Injectable 12.5 Gram(s) IV Push once  dextrose 50% Injectable 25 Gram(s) IV Push once  doxazosin 4 milliGRAM(s) Oral <User Schedule>  doxepin 25 milliGRAM(s) Oral at bedtime  fenofibrate Tablet 145 milliGRAM(s) Oral daily  glucagon  Injectable 1 milliGRAM(s) IntraMuscular once  hydrALAZINE 50 milliGRAM(s) Oral every 8 hours  influenza   Vaccine 0.5 milliLiter(s) IntraMuscular once  insulin lispro (ADMELOG) corrective regimen sliding scale   SubCutaneous three times a day before meals  insulin lispro (ADMELOG) corrective regimen sliding scale   SubCutaneous at bedtime  labetalol 100 milliGRAM(s) Oral two times a day  lamoTRIgine 100 milliGRAM(s) Oral daily  mirtazapine 30 milliGRAM(s) Oral at bedtime  oxybutynin 15 milliGRAM(s) Oral daily  pantoprazole    Tablet 40 milliGRAM(s) Oral daily  potassium phosphate / sodium phosphate Powder (PHOS-NaK) 1 Packet(s) Oral once  sodium chloride 0.45%. 1000 milliLiter(s) (50 mL/Hr) IV Continuous <Continuous>  venlafaxine XR. 150 milliGRAM(s) Oral daily    MEDICATIONS  (PRN):    Allergies    No Known Allergies    Intolerances    Vital Signs Last 24 Hrs  T(C): 36.8 (30 Nov 2020 08:00), Max: 36.8 (29 Nov 2020 23:59)  T(F): 98.3 (30 Nov 2020 08:00), Max: 98.3 (30 Nov 2020 08:00)  HR: 75 (30 Nov 2020 08:00) (54 - 75)  BP: 183/75 (30 Nov 2020 08:00) (167/76 - 183/75)  BP(mean): --  RR: 18 (30 Nov 2020 08:00) (18 - 19)  SpO2: 95% (30 Nov 2020 08:00) (95% - 97%)  I&O's Summary    29 Nov 2020 07:01  -  30 Nov 2020 07:00  --------------------------------------------------------  IN: 670 mL / OUT: 400 mL / NET: 270 mL      PHYSICAL EXAM:  TELE: NSR  Constitutional: NAD, awake and alert, obese  HEENT: Moist Mucous Membranes, Anicteric  Pulmonary: Non-labored, breath sounds are clear bilaterally, No wheezing, rales or rhonchi  Cardiovascular: Regular, S1 and S2, + murmurs, no rubs, gallops or clicks  Gastrointestinal: Bowel Sounds present, soft, nontender.   Lymph: No peripheral edema. No lymphadenopathy.  Skin: No visible rashes or ulcers.  Psych:  Mood & affect appropriate  LABS: All Labs Reviewed:                        9.2    8.06  )-----------( 293      ( 30 Nov 2020 09:58 )             29.0                         8.7    x     )-----------( x        ( 29 Nov 2020 15:55 )             26.8                         8.9    7.22  )-----------( 297      ( 29 Nov 2020 07:14 )             28.0     30 Nov 2020 09:58    140    |  109    |  12     ----------------------------<  161    4.1     |  26     |  1.90   29 Nov 2020 07:14    144    |  110    |  16     ----------------------------<  97     3.3     |  22     |  1.80   28 Nov 2020 05:52    142    |  109    |  25     ----------------------------<  119    3.5     |  25     |  2.30     Ca    8.6        30 Nov 2020 09:58  Ca    8.6        29 Nov 2020 07:14  Ca    8.9        28 Nov 2020 05:52  Phos  2.0       30 Nov 2020 09:58  Phos  2.4       29 Nov 2020 07:14  Phos  3.0       28 Nov 2020 05:52  Mg     2.1       30 Nov 2020 09:58  Mg     1.9       28 Nov 2020 05:52    TPro  6.8    /  Alb  3.4    /  TBili  0.4    /  DBili  x      /  AST  23     /  ALT  25     /  AlkPhos  36     30 Nov 2020 09:58  TPro  6.5    /  Alb  3.4    /  TBili  0.5    /  DBili  x      /  AST  22     /  ALT  22     /  AlkPhos  31     28 Nov 2020 05:52  TPro  6.8    /  Alb  3.4    /  TBili  0.3    /  DBili  x      /  AST  18     /  ALT  21     /  AlkPhos  30     27 Nov 2020 17:43      EXAM:  XR CHEST PORTABLE URGENT 1V                          PROCEDURE DATE:  11/27/2020      INTERPRETATION:  Exam:XR CHEST URGENT    clinical history:rectal bleed    Heart size is normal. Lungs show no acute infiltrate. No pleural effusion.    IMPRESSION:  No active parenchymal disease in the chest.    ARASH VASQUES MD; Attending Radiologist  This document has been electronically signed. Nov 28 2020  8:27AM    Ventricular Rate 54 BPM    Atrial Rate 54 BPM    P-R Interval 334 ms    QRS Duration 112 ms    Q-T Interval 360 ms    QTC Calculation(Bazett) 341 ms    R Axis -17 degrees    T Axis 228 degrees    Diagnosis Line Sinus bradycardia with 1st degree AV block  Incomplete right bundle branch block  Left ventricular hypertrophy with repolarization abnormality  Abnormal ECG  When compared with ECG of 24-JAN-2016 22:16,  ST now depressed in Anterolateral leads  Nonspecific T wave abnormality now evident in Inferior leads  T wave inversion now evident in Anterolateral leads  QT has shortened  Confirmed by Domingo Posey MD ([id number]) on 11/28/2020 2:23:56 PM            [facility] -- Fifi Bliss, Kalpesh Valdovinos, Abraham Sheridan Savella, [place]  Office # [phone]    Follow Up:  Elevated Troponins and cardiac murmur    Subjective/Observations: Awake and alert, comfortable on RA.  Up and about in his room and the hallway.  Denies SOB, VALDEZ or orthopnea.  Denies CP or palpitatuons    REVIEW OF SYSTEMS: All other review of systems is negative unless indicated above  PAST MEDICAL & SURGICAL HISTORY:  Hyperlipidemia    Diverticulosis    CAD (coronary artery disease)  s/p stents    Anxiety and depression    Hepatitis C    Lupus    Diabetes mellitus    Hypertension    H/O hernia repair    S/P cataract surgery  Bilateral    Pilonidal cyst  Had surgery ( 1969 )    Torsion of testicle  Had surgery at age 14    S/P arthroscopic knee surgery  Bilateral ( 2005 )    S/P tonsillectomy    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  Had surgery ( April 2013 )      MEDICATIONS  (STANDING):  amLODIPine   Tablet 10 milliGRAM(s) Oral daily  atorvastatin 40 milliGRAM(s) Oral at bedtime  busPIRone 15 milliGRAM(s) Oral two times a day  cholecalciferol 5000 Unit(s) Oral daily  dextrose 40% Gel 15 Gram(s) Oral once  dextrose 5%. 1000 milliLiter(s) (50 mL/Hr) IV Continuous <Continuous>  dextrose 5%. 1000 milliLiter(s) (100 mL/Hr) IV Continuous <Continuous>  dextrose 50% Injectable 25 Gram(s) IV Push once  dextrose 50% Injectable 12.5 Gram(s) IV Push once  dextrose 50% Injectable 25 Gram(s) IV Push once  doxazosin 4 milliGRAM(s) Oral <User Schedule>  doxepin 25 milliGRAM(s) Oral at bedtime  fenofibrate Tablet 145 milliGRAM(s) Oral daily  glucagon  Injectable 1 milliGRAM(s) IntraMuscular once  hydrALAZINE 50 milliGRAM(s) Oral every 8 hours  influenza   Vaccine 0.5 milliLiter(s) IntraMuscular once  insulin lispro (ADMELOG) corrective regimen sliding scale   SubCutaneous three times a day before meals  insulin lispro (ADMELOG) corrective regimen sliding scale   SubCutaneous at bedtime  labetalol 100 milliGRAM(s) Oral two times a day  lamoTRIgine 100 milliGRAM(s) Oral daily  mirtazapine 30 milliGRAM(s) Oral at bedtime  oxybutynin 15 milliGRAM(s) Oral daily  pantoprazole    Tablet 40 milliGRAM(s) Oral daily  potassium phosphate / sodium phosphate Powder (PHOS-NaK) 1 Packet(s) Oral once  sodium chloride 0.45%. 1000 milliLiter(s) (50 mL/Hr) IV Continuous <Continuous>  venlafaxine XR. 150 milliGRAM(s) Oral daily    MEDICATIONS  (PRN):    Allergies    No Known Allergies    Intolerances    Vital Signs Last 24 Hrs  T(C): 36.8 (30 Nov 2020 08:00), Max: 36.8 (29 Nov 2020 23:59)  T(F): 98.3 (30 Nov 2020 08:00), Max: 98.3 (30 Nov 2020 08:00)  HR: 75 (30 Nov 2020 08:00) (54 - 75)  BP: 183/75 (30 Nov 2020 08:00) (167/76 - 183/75)  BP(mean): --  RR: 18 (30 Nov 2020 08:00) (18 - 19)  SpO2: 95% (30 Nov 2020 08:00) (95% - 97%)  I&O's Summary    29 Nov 2020 07:01  -  30 Nov 2020 07:00  --------------------------------------------------------  IN: 670 mL / OUT: 400 mL / NET: 270 mL      PHYSICAL EXAM:  TELE: NSR  Constitutional: NAD, awake and alert, obese  HEENT: Moist Mucous Membranes, Anicteric  Pulmonary: Non-labored, breath sounds are clear bilaterally, No wheezing, rales or rhonchi  Cardiovascular: Regular, S1 and S2, + murmurs, no rubs, gallops or clicks  Gastrointestinal: Bowel Sounds present, soft, nontender.   Lymph: No peripheral edema. No lymphadenopathy.  Skin: No visible rashes or ulcers.  Psych:  Mood & affect appropriate  LABS: All Labs Reviewed:                        9.2    8.06  )-----------( 293      ( 30 Nov 2020 09:58 )             29.0                         8.7    x     )-----------( x        ( 29 Nov 2020 15:55 )             26.8                         8.9    7.22  )-----------( 297      ( 29 Nov 2020 07:14 )             28.0     30 Nov 2020 09:58    140    |  109    |  12     ----------------------------<  161    4.1     |  26     |  1.90   29 Nov 2020 07:14    144    |  110    |  16     ----------------------------<  97     3.3     |  22     |  1.80   28 Nov 2020 05:52    142    |  109    |  25     ----------------------------<  119    3.5     |  25     |  2.30     Ca    8.6        30 Nov 2020 09:58  Ca    8.6        29 Nov 2020 07:14  Ca    8.9        28 Nov 2020 05:52  Phos  2.0       30 Nov 2020 09:58  Phos  2.4       29 Nov 2020 07:14  Phos  3.0       28 Nov 2020 05:52  Mg     2.1       30 Nov 2020 09:58  Mg     1.9       28 Nov 2020 05:52    TPro  6.8    /  Alb  3.4    /  TBili  0.4    /  DBili  x      /  AST  23     /  ALT  25     /  AlkPhos  36     30 Nov 2020 09:58  TPro  6.5    /  Alb  3.4    /  TBili  0.5    /  DBili  x      /  AST  22     /  ALT  22     /  AlkPhos  31     28 Nov 2020 05:52  TPro  6.8    /  Alb  3.4    /  TBili  0.3    /  DBili  x      /  AST  18     /  ALT  21     /  AlkPhos  30     27 Nov 2020 17:43      EXAM:  XR CHEST PORTABLE URGENT 1V                          PROCEDURE DATE:  11/27/2020      INTERPRETATION:  Exam:XR CHEST URGENT    clinical history:rectal bleed    Heart size is normal. Lungs show no acute infiltrate. No pleural effusion.    IMPRESSION:  No active parenchymal disease in the chest.    ARASH VASQUES MD; Attending Radiologist  This document has been electronically signed. Nov 28 2020  8:27AM    Ventricular Rate 54 BPM    Atrial Rate 54 BPM    P-R Interval 334 ms    QRS Duration 112 ms    Q-T Interval 360 ms    QTC Calculation(Bazett) 341 ms    R Axis -17 degrees    T Axis 228 degrees    Diagnosis Line Sinus bradycardia with 1st degree AV block  Incomplete right bundle branch block  Left ventricular hypertrophy with repolarization abnormality  Abnormal ECG  When compared with ECG of 24-JAN-2016 22:16,  ST now depressed in Anterolateral leads  Nonspecific T wave abnormality now evident in Inferior leads  T wave inversion now evident in Anterolateral leads  QT has shortened  Confirmed by Domingo Posey MD ([id number]) on 11/28/2020 2:23:56 PM            [facility] -- Fifi Bliss, Kalpesh Valdovinos, Abraham Sheridan Savella, [place]  Office # [phone]    Follow Up:  Elevated Troponins and cardiac murmur    Subjective/Observations: Awake and alert, comfortable on RA.  Up and about in his room and the hallway.  Denies SOB, VALDEZ or orthopnea.  Denies CP or palpitations    REVIEW OF SYSTEMS: All other review of systems is negative unless indicated above    PAST MEDICAL & SURGICAL HISTORY:  Hyperlipidemia    Diverticulosis    CAD (coronary artery disease)  s/p stents    Anxiety and depression    Hepatitis C    Lupus    Diabetes mellitus    Hypertension    H/O hernia repair    S/P cataract surgery  Bilateral    Pilonidal cyst  Had surgery ( 1969 )    Torsion of testicle  Had surgery at age 14    S/P arthroscopic knee surgery  Bilateral ( 2005 )    S/P tonsillectomy    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  Had surgery ( April 2013 )      MEDICATIONS  (STANDING):  amLODIPine   Tablet 10 milliGRAM(s) Oral daily  atorvastatin 40 milliGRAM(s) Oral at bedtime  busPIRone 15 milliGRAM(s) Oral two times a day  cholecalciferol 5000 Unit(s) Oral daily  dextrose 40% Gel 15 Gram(s) Oral once  dextrose 5%. 1000 milliLiter(s) (50 mL/Hr) IV Continuous <Continuous>  dextrose 5%. 1000 milliLiter(s) (100 mL/Hr) IV Continuous <Continuous>  dextrose 50% Injectable 25 Gram(s) IV Push once  dextrose 50% Injectable 12.5 Gram(s) IV Push once  dextrose 50% Injectable 25 Gram(s) IV Push once  doxazosin 4 milliGRAM(s) Oral <User Schedule>  doxepin 25 milliGRAM(s) Oral at bedtime  fenofibrate Tablet 145 milliGRAM(s) Oral daily  glucagon  Injectable 1 milliGRAM(s) IntraMuscular once  hydrALAZINE 50 milliGRAM(s) Oral every 8 hours  influenza   Vaccine 0.5 milliLiter(s) IntraMuscular once  insulin lispro (ADMELOG) corrective regimen sliding scale   SubCutaneous three times a day before meals  insulin lispro (ADMELOG) corrective regimen sliding scale   SubCutaneous at bedtime  labetalol 100 milliGRAM(s) Oral two times a day  lamoTRIgine 100 milliGRAM(s) Oral daily  mirtazapine 30 milliGRAM(s) Oral at bedtime  oxybutynin 15 milliGRAM(s) Oral daily  pantoprazole    Tablet 40 milliGRAM(s) Oral daily  potassium phosphate / sodium phosphate Powder (PHOS-NaK) 1 Packet(s) Oral once  sodium chloride 0.45%. 1000 milliLiter(s) (50 mL/Hr) IV Continuous <Continuous>  venlafaxine XR. 150 milliGRAM(s) Oral daily    MEDICATIONS  (PRN):    Allergies    No Known Allergies    Intolerances    Vital Signs Last 24 Hrs  T(C): 36.8 (30 Nov 2020 08:00), Max: 36.8 (29 Nov 2020 23:59)  T(F): 98.3 (30 Nov 2020 08:00), Max: 98.3 (30 Nov 2020 08:00)  HR: 75 (30 Nov 2020 08:00) (54 - 75)  BP: 183/75 (30 Nov 2020 08:00) (167/76 - 183/75)  BP(mean): --  RR: 18 (30 Nov 2020 08:00) (18 - 19)  SpO2: 95% (30 Nov 2020 08:00) (95% - 97%)  I&O's Summary    29 Nov 2020 07:01  -  30 Nov 2020 07:00  --------------------------------------------------------  IN: 670 mL / OUT: 400 mL / NET: 270 mL      PHYSICAL EXAM:  TELE: NSR  Constitutional: NAD, awake    HEENT: Moist Mucous Membranes, Anicteric  Pulmonary: Non-labored, breath sounds are clear bilaterally, No wheezing, rales or rhonchi  Cardiovascular: Regular, S1 and S2, + murmurs, no rubs, gallops or clicks  Gastrointestinal: Bowel Sounds present, soft, nontender.   Lymph: No peripheral edema. No lymphadenopathy.  Skin: No visible rashes or ulcers.  Psych:  Mood & affect appropriate  LABS: All Labs Reviewed:                        9.2    8.06  )-----------( 293      ( 30 Nov 2020 09:58 )             29.0                         8.7    x     )-----------( x        ( 29 Nov 2020 15:55 )             26.8                         8.9    7.22  )-----------( 297      ( 29 Nov 2020 07:14 )             28.0     30 Nov 2020 09:58    140    |  109    |  12     ----------------------------<  161    4.1     |  26     |  1.90   29 Nov 2020 07:14    144    |  110    |  16     ----------------------------<  97     3.3     |  22     |  1.80   28 Nov 2020 05:52    142    |  109    |  25     ----------------------------<  119    3.5     |  25     |  2.30     Ca    8.6        30 Nov 2020 09:58  Ca    8.6        29 Nov 2020 07:14  Ca    8.9        28 Nov 2020 05:52  Phos  2.0       30 Nov 2020 09:58  Phos  2.4       29 Nov 2020 07:14  Phos  3.0       28 Nov 2020 05:52  Mg     2.1       30 Nov 2020 09:58  Mg     1.9       28 Nov 2020 05:52    TPro  6.8    /  Alb  3.4    /  TBili  0.4    /  DBili  x      /  AST  23     /  ALT  25     /  AlkPhos  36     30 Nov 2020 09:58  TPro  6.5    /  Alb  3.4    /  TBili  0.5    /  DBili  x      /  AST  22     /  ALT  22     /  AlkPhos  31     28 Nov 2020 05:52  TPro  6.8    /  Alb  3.4    /  TBili  0.3    /  DBili  x      /  AST  18     /  ALT  21     /  AlkPhos  30     27 Nov 2020 17:43      EXAM:  XR CHEST PORTABLE URGENT 1V                          PROCEDURE DATE:  11/27/2020      INTERPRETATION:  Exam:XR CHEST URGENT    clinical history:rectal bleed    Heart size is normal. Lungs show no acute infiltrate. No pleural effusion.    IMPRESSION:  No active parenchymal disease in the chest.    ARASH VASQUES MD; Attending Radiologist  This document has been electronically signed. Nov 28 2020  8:27AM    Ventricular Rate 54 BPM    Atrial Rate 54 BPM    P-R Interval 334 ms    QRS Duration 112 ms    Q-T Interval 360 ms    QTC Calculation(Bazett) 341 ms    R Axis -17 degrees    T Axis 228 degrees    Diagnosis Line Sinus bradycardia with 1st degree AV block  Incomplete right bundle branch block  Left ventricular hypertrophy with repolarization abnormality  Abnormal ECG  When compared with ECG of 24-JAN-2016 22:16,  ST now depressed in Anterolateral leads  Nonspecific T wave abnormality now evident in Inferior leads  T wave inversion now evident in Anterolateral leads  QT has shortened  Confirmed by Domingo Posey MD ([id number]) on 11/28/2020 2:23:56 PM

## 2020-11-30 NOTE — DISCHARGE NOTE PROVIDER - NSDCFUADDAPPT_GEN_ALL_CORE_FT
Follow up with your primary care doctor within 3 days of discharge.  Follow up with your Gastroenterologist, Dr. Crockett(contact info provided), to schedule a colonoscopy.  Follow up with you cardiologist, Dr. Burciaga, within 1 week of discharge.

## 2021-01-23 ENCOUNTER — EMERGENCY (EMERGENCY)
Facility: HOSPITAL | Age: 76
LOS: 1 days | Discharge: HOME | End: 2021-01-23
Attending: EMERGENCY MEDICINE | Admitting: EMERGENCY MEDICINE
Payer: MEDICARE

## 2021-01-23 VITALS
RESPIRATION RATE: 16 BRPM | WEIGHT: 220.02 LBS | HEART RATE: 58 BPM | SYSTOLIC BLOOD PRESSURE: 160 MMHG | TEMPERATURE: 98 F | HEIGHT: 68 IN | DIASTOLIC BLOOD PRESSURE: 80 MMHG

## 2021-01-23 DIAGNOSIS — I66.9 OCCLUSION AND STENOSIS OF UNSPECIFIED CEREBRAL ARTERY: Chronic | ICD-10-CM

## 2021-01-23 DIAGNOSIS — Z98.49 CATARACT EXTRACTION STATUS, UNSPECIFIED EYE: Chronic | ICD-10-CM

## 2021-01-23 DIAGNOSIS — Z98.89 OTHER SPECIFIED POSTPROCEDURAL STATES: Chronic | ICD-10-CM

## 2021-01-23 DIAGNOSIS — L05.91 PILONIDAL CYST WITHOUT ABSCESS: Chronic | ICD-10-CM

## 2021-01-23 DIAGNOSIS — N44.00 TORSION OF TESTIS, UNSPECIFIED: Chronic | ICD-10-CM

## 2021-01-23 LAB
ALBUMIN SERPL ELPH-MCNC: 3.7 G/DL — SIGNIFICANT CHANGE UP (ref 3.3–5)
ALP SERPL-CCNC: 34 U/L — LOW (ref 40–120)
ALT FLD-CCNC: 26 U/L — SIGNIFICANT CHANGE UP (ref 12–78)
ANION GAP SERPL CALC-SCNC: 9 MMOL/L — SIGNIFICANT CHANGE UP (ref 5–17)
APTT BLD: 35.4 SEC — SIGNIFICANT CHANGE UP (ref 27.5–35.5)
AST SERPL-CCNC: 17 U/L — SIGNIFICANT CHANGE UP (ref 15–37)
BASOPHILS # BLD AUTO: 0.03 K/UL — SIGNIFICANT CHANGE UP (ref 0–0.2)
BASOPHILS NFR BLD AUTO: 0.4 % — SIGNIFICANT CHANGE UP (ref 0–2)
BILIRUB SERPL-MCNC: 0.4 MG/DL — SIGNIFICANT CHANGE UP (ref 0.2–1.2)
BUN SERPL-MCNC: 35 MG/DL — HIGH (ref 7–23)
CALCIUM SERPL-MCNC: 9.1 MG/DL — SIGNIFICANT CHANGE UP (ref 8.5–10.1)
CHLORIDE SERPL-SCNC: 106 MMOL/L — SIGNIFICANT CHANGE UP (ref 96–108)
CO2 SERPL-SCNC: 25 MMOL/L — SIGNIFICANT CHANGE UP (ref 22–31)
CREAT SERPL-MCNC: 1.9 MG/DL — HIGH (ref 0.5–1.3)
EOSINOPHIL # BLD AUTO: 0.16 K/UL — SIGNIFICANT CHANGE UP (ref 0–0.5)
EOSINOPHIL NFR BLD AUTO: 2.2 % — SIGNIFICANT CHANGE UP (ref 0–6)
GLUCOSE SERPL-MCNC: 105 MG/DL — HIGH (ref 70–99)
HCT VFR BLD CALC: 31.7 % — LOW (ref 39–50)
HGB BLD-MCNC: 9.9 G/DL — LOW (ref 13–17)
IMM GRANULOCYTES NFR BLD AUTO: 0.1 % — SIGNIFICANT CHANGE UP (ref 0–1.5)
INR BLD: 1.17 RATIO — HIGH (ref 0.88–1.16)
LYMPHOCYTES # BLD AUTO: 1.89 K/UL — SIGNIFICANT CHANGE UP (ref 1–3.3)
LYMPHOCYTES # BLD AUTO: 26.2 % — SIGNIFICANT CHANGE UP (ref 13–44)
MCHC RBC-ENTMCNC: 25.6 PG — LOW (ref 27–34)
MCHC RBC-ENTMCNC: 31.2 GM/DL — LOW (ref 32–36)
MCV RBC AUTO: 81.9 FL — SIGNIFICANT CHANGE UP (ref 80–100)
MONOCYTES # BLD AUTO: 0.4 K/UL — SIGNIFICANT CHANGE UP (ref 0–0.9)
MONOCYTES NFR BLD AUTO: 5.5 % — SIGNIFICANT CHANGE UP (ref 2–14)
NEUTROPHILS # BLD AUTO: 4.73 K/UL — SIGNIFICANT CHANGE UP (ref 1.8–7.4)
NEUTROPHILS NFR BLD AUTO: 65.6 % — SIGNIFICANT CHANGE UP (ref 43–77)
NRBC # BLD: 0 /100 WBCS — SIGNIFICANT CHANGE UP (ref 0–0)
PLATELET # BLD AUTO: 279 K/UL — SIGNIFICANT CHANGE UP (ref 150–400)
POTASSIUM SERPL-MCNC: 3.3 MMOL/L — LOW (ref 3.5–5.3)
POTASSIUM SERPL-SCNC: 3.3 MMOL/L — LOW (ref 3.5–5.3)
PROT SERPL-MCNC: 7.5 G/DL — SIGNIFICANT CHANGE UP (ref 6–8.3)
PROTHROM AB SERPL-ACNC: 13.6 SEC — SIGNIFICANT CHANGE UP (ref 10.6–13.6)
RBC # BLD: 3.87 M/UL — LOW (ref 4.2–5.8)
RBC # FLD: 14.4 % — SIGNIFICANT CHANGE UP (ref 10.3–14.5)
SODIUM SERPL-SCNC: 140 MMOL/L — SIGNIFICANT CHANGE UP (ref 135–145)
TROPONIN I SERPL-MCNC: 0.03 NG/ML — SIGNIFICANT CHANGE UP (ref 0.01–0.04)
WBC # BLD: 7.22 K/UL — SIGNIFICANT CHANGE UP (ref 3.8–10.5)
WBC # FLD AUTO: 7.22 K/UL — SIGNIFICANT CHANGE UP (ref 3.8–10.5)

## 2021-01-23 PROCEDURE — 99285 EMERGENCY DEPT VISIT HI MDM: CPT | Mod: 25

## 2021-01-23 PROCEDURE — 85610 PROTHROMBIN TIME: CPT

## 2021-01-23 PROCEDURE — 85730 THROMBOPLASTIN TIME PARTIAL: CPT

## 2021-01-23 PROCEDURE — 71045 X-RAY EXAM CHEST 1 VIEW: CPT | Mod: 26

## 2021-01-23 PROCEDURE — 93010 ELECTROCARDIOGRAM REPORT: CPT

## 2021-01-23 PROCEDURE — 36415 COLL VENOUS BLD VENIPUNCTURE: CPT

## 2021-01-23 PROCEDURE — 85025 COMPLETE CBC W/AUTO DIFF WBC: CPT

## 2021-01-23 PROCEDURE — 93005 ELECTROCARDIOGRAM TRACING: CPT

## 2021-01-23 PROCEDURE — 99285 EMERGENCY DEPT VISIT HI MDM: CPT

## 2021-01-23 PROCEDURE — 84484 ASSAY OF TROPONIN QUANT: CPT

## 2021-01-23 PROCEDURE — 71045 X-RAY EXAM CHEST 1 VIEW: CPT

## 2021-01-23 PROCEDURE — 99284 EMERGENCY DEPT VISIT MOD MDM: CPT

## 2021-01-23 PROCEDURE — 80053 COMPREHEN METABOLIC PANEL: CPT

## 2021-01-23 RX ORDER — POTASSIUM CHLORIDE 20 MEQ
40 PACKET (EA) ORAL ONCE
Refills: 0 | Status: COMPLETED | OUTPATIENT
Start: 2021-01-23 | End: 2021-01-23

## 2021-01-23 RX ADMIN — Medication 40 MILLIEQUIVALENT(S): at 15:25

## 2021-01-23 NOTE — ED PROVIDER NOTE - PROVIDER TOKENS
PROVIDER:[TOKEN:[[id number]],FOLLOWUP:[1-3 Days]],PROVIDER:[TOKEN:[[id number]],FOLLOWUP:[1-3 Days]],PROVIDER:[TOKEN:[[id number]],FOLLOWUP:[1-3 Days]]

## 2021-01-23 NOTE — ED PROVIDER NOTE - NSFOLLOWUPINSTRUCTIONS_ED_ALL_ED_FT
drink plenty of fluids  tylenol or motrin for pain   have close follow up with primary care and cardiology       Chest Pain    WHAT YOU NEED TO KNOW:    Chest pain can be caused by a range of conditions, from not serious to life-threatening. Chest pain can be a symptom of a digestive problem, such as acid reflux or a stomach ulcer. An anxiety attack or a strong emotion, such as anger, can also cause chest pain. Infection, inflammation, or a fracture in the bones or cartilage in your chest can cause pain or discomfort. Sometimes chest pain or pressure is caused by poor blood flow to your heart (angina). Chest pain may also be caused by life-threatening conditions such as a heart attack or blood clot in your lungs.    DISCHARGE INSTRUCTIONS:    Call your local emergency number (911 in the [place]) or have someone call if:   •You have any of the following signs of a heart attack: ?Squeezing, pressure, or pain in your chest      ?You may also have any of the following: ?Discomfort or pain in your back, neck, jaw, stomach, or arm      ?Shortness of breath      ?Nausea or vomiting      ?Lightheadedness or a sudden cold sweat            Return to the emergency department if:   •You have chest discomfort that gets worse, even with medicine.      •You cough or vomit blood.      •Your bowel movements are black or bloody.      •You cannot stop vomiting, or it hurts to swallow.      Call your doctor if:   •You have questions or concerns about your condition or care.          Medicines:   •Medicines may be given to treat the cause of your chest pain. Examples include pain medicine, anxiety medicine, or medicines to increase blood flow to your heart.      •Do not take certain medicines without asking your healthcare provider first. These include NSAIDs, herbal or vitamin supplements, or hormones (estrogen or progestin).      •Take your medicine as directed. Contact your healthcare provider if you think your medicine is not helping or if you have side effects. Tell him or her if you are allergic to any medicine. Keep a list of the medicines, vitamins, and herbs you take. Include the amounts, and when and why you take them. Bring the list or the pill bottles to follow-up visits. Carry your medicine list with you in case of an emergency.      Healthy living tips: The following are general healthy guidelines. If the cause of your chest pain is known, your healthcare provider will give you specific guidelines to follow.  •Do not smoke. Nicotine and other chemicals in cigarettes and cigars can cause lung and heart damage. Ask your healthcare provider for information if you currently smoke and need help to quit. E-cigarettes or smokeless tobacco still contain nicotine. Talk to your healthcare provider before you use these products.      •Choose a variety of healthy foods as often as possible. Include fresh, frozen, or canned fruits and vegetables. Also include low-fat dairy products, fish, chicken (without skin), and lean meats. Your healthcare provider or a dietitian can help you create meal plans. You may need to avoid certain foods or drinks if your pain is caused by a digestion problem.  Healthy Foods           •Lower your sodium (salt) intake. Limit foods that are high in sodium, such as canned foods, salty snacks, and cold cuts. If you add salt when you cook food, do not add more at the table. Choose low-sodium canned foods as much as possible.             •Drink plenty of water every day. Water helps your body to control your temperature and blood pressure. Ask your healthcare provider how much water you should drink every day.      •Ask about activity. Your healthcare provider will tell you which activities to limit or avoid. Ask when you can drive, return to work, and have sex. Ask about the best exercise plan for you.      •Maintain a healthy weight. Ask your healthcare provider what a healthy weight is for you. Ask him or her to help you create a safe weight loss plan if you are overweight.      •Ask about vaccines you may need. Get the influenza (flu) vaccine every year as soon as recommended, usually in September or October. You may also need a pneumococcal vaccine to prevent pneumonia. The vaccine is usually given every 5 years, starting at age 65. Your healthcare provider can tell you if should get other vaccines, and when to get them.      Follow up with your healthcare provider within 72 hours, or as directed: You may need to return for more tests to find the cause of your chest pain. You may be referred to a specialist, such as a cardiologist or gastroenterologist. Write down your questions so you remember to ask them during your visits.

## 2021-01-23 NOTE — ED PROVIDER NOTE - PROGRESS NOTE DETAILS
resting comfortable. vitals stable. spoke with Dr. Angulo (cards). will see patient in ED Reevaluated patient at bedside.  Patient feeling improved.  no current chest pain or shortness of breath. was seen and evaluated by cards (Dr. Angulo). 2nd trop negative. cleared for discharge. recommend cardiology outpatient follow up. Discussed the results of all diagnostic testing in ED and copies of all reports given.   An opportunity to ask questions was given.  Discussed the importance of prompt, close medical follow-up.  Patient will return with any changes, concerns or persistent / worsening symptoms.  Understanding of all instructions verbalized.

## 2021-01-23 NOTE — ED PROVIDER NOTE - PATIENT PORTAL LINK FT
You can access the FollowMyHealth Patient Portal offered by [facility] by registering at the following website: [email or web address]. By joining [facility]’s FollowMyHealth portal, you will also be able to view your health information using other applications (apps) compatible with our system.

## 2021-01-23 NOTE — ED PROVIDER NOTE - CARE PROVIDERS DIRECT ADDRESSES
,DirectAddress_Unknown,[email or web address]

## 2021-01-23 NOTE — ED PROVIDER NOTE - CARE PLAN
Principal Discharge DX:	Chest pain   Principal Discharge DX:	Chest pain  Secondary Diagnosis:	Dizziness  Secondary Diagnosis:	CAD (coronary artery disease)

## 2021-01-23 NOTE — ED PROVIDER NOTE - CARE PROVIDER_API CALL
Erich Man)  Medicine  [address] [place] [id number]  Phone: [phone]  Fax: [phone]  Follow Up Time: 1-3 Days    Cheyenne Angulo (DO)  Internal Medicine  [address] [facility], [facility] [address]  [place] [address]  Phone: [phone]  Fax: [phone]  Follow Up Time: 1-3 Days    Domingo Posey)  Cardiovascular Disease  [address] [place] [address]  Phone: [phone]  Fax: [phone]  Follow Up Time: 1-3 Days

## 2021-01-23 NOTE — ED PROVIDER NOTE - ATTENDING CONTRIBUTION TO CARE
I have personally performed a face to face diagnostic evaluation on this patient.  I have reviewed the PA note and agree with the history, exam, and plan of care, except as noted.  History and Exam by me shows  chest pain 2 weeks ago.  pt is in nad.  a n o x 3.  heart s1s2, rrr, no murmurs. lungs cta.  labs were unremarkable.

## 2021-01-23 NOTE — ED PROVIDER NOTE - CLINICAL SUMMARY MEDICAL DECISION MAKING FREE TEXT BOX
intermittent chest pain and dizziness past 2-3 weeks. differentials include but not limited to ACS, dehydration, electrolyte abnormality, angina, anxiety reaction. will check labs, EKG, CXR, cardio consult

## 2021-01-23 NOTE — CONSULT NOTE ADULT - ASSESSMENT
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     CP:  atypical  trop 0.024  check second trop  ECG sinus pelon without acute ST-T wave abnormalities  no s/s HF  c/w plavix and statin    HTN:  elevated today  c/w current meds  if discharged advised f/u with PCP and cardio    CAD/PCI:  normal LV function  c/w current meds  He needs outpt f/u with cardio    If trop and ECG remain stable and pt is without symptoms he can be discharged home with cardiology f/u  Thank you for this consult!

## 2021-01-23 NOTE — ED PROVIDER NOTE - OBJECTIVE STATEMENT
76 year old male with history of HTN, HLD, CAD (with stents), diverticulosis, anxiety, depression, Hep C, lupus, DM, and blood clots in brain (s/p surgery 2013) presents with intermittent chest pain. reports right sided sharp chest pain 2 weeks ago that lasted most of day. then resolved. today around 10:00, had about 15 sec of sharp chest pain, then resolved. pain did not radiate. no shortness of breath. also had about 3-5 sec chest pain while in ED room, now resolved. no abd pain, n/v. also reports occ dizzy spells in the am that last for about 1 min for the past 3 weeks. does not occur every day. had a episode this am. no current dizziness. no VOGT.   PCP Magda Jason (was seen by Abraham/Kalpesh during last ED stay)

## 2021-01-23 NOTE — CONSULT NOTE ADULT - SUBJECTIVE AND OBJECTIVE BOX
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  HE states that he has had R sided chest soreness for the past 2 mnths (last episode a few weeks ago).  Pain is not associated with exertion and has no associated symptoms.  Yesterday he had acute onset L sided sharp chest pain, non-radiating which was different from the R sided pain in the past.  It occurred while he was putting away groceries and lasted 30sec.  No SOB, N/V, diaphoresis, palpitations.  He decided to come to the ER.  While in the ER he had a second episode which lasted about 10 min.  Currently he is symptom free  TTE 11/30/2020 EF 65%, mild AS, mild MR  ROS otherwise negative unless noted    In ED:  ECG sinus pelon with LVH, nonspecific T wave abnormality, no changes from previous  CXR clear lungs  trop .024  Cr 1.9  K 3.3        PAST MEDICAL & SURGICAL HISTORY:  Hyperlipidemia    Diverticulosis    CAD (coronary artery disease)  s/p stents    Anxiety and depression    Hepatitis C    Lupus    Diabetes mellitus    Hypertension    H/O hernia repair    S/P cataract surgery  Bilateral    Pilonidal cyst  Had surgery ( 1969 )    Torsion of testicle  Had surgery at age 14    S/P arthroscopic knee surgery  Bilateral ( 2005 )    S/P tonsillectomy    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  Had surgery ( April 2013 )        MEDICATIONS  (STANDING):      Allergies    No Known Allergies    Intolerances                              9.9    7.22  )-----------( 279      ( 23 Jan 2021 11:02 )             31.7       01-23    140  |  106  |  35<H>  ----------------------------<  105<H>  3.3<L>   |  25  |  1.90<H>    Ca    9.1      23 Jan 2021 11:02    TPro  7.5  /  Alb  3.7  /  TBili  0.4  /  DBili  x   /  AST  17  /  ALT  26  /  AlkPhos  34<L>  01-23      LIVER FUNCTIONS - ( 23 Jan 2021 11:02 )  Alb: 3.7 g/dL / Pro: 7.5 g/dL / ALK PHOS: 34 U/L / ALT: 26 U/L / AST: 17 U/L / GGT: x             PT/INR - ( 23 Jan 2021 11:02 )   PT: 13.6 sec;   INR: 1.17 ratio         PTT - ( 23 Jan 2021 11:02 )  PTT:35.4 sec    CARDIAC MARKERS ( 23 Jan 2021 11:02 )  .024 ng/mL / x     / x     / x     / x                        Daily Height in cm: 172.72 (23 Jan 2021 10:33)    Daily     I&O's Summary      Vital Signs Last 24 Hrs  T(C): 36.4 (23 Jan 2021 10:33), Max: 36.4 (23 Jan 2021 10:33)  T(F): 97.6 (23 Jan 2021 10:33), Max: 97.6 (23 Jan 2021 10:33)  HR: 58 (23 Jan 2021 10:33) (58 - 58)  BP: 160/80 (23 Jan 2021 10:33) (160/80 - 160/80)  BP(mean): --  RR: 16 (23 Jan 2021 10:33) (16 - 16)  SpO2: --    PHYSICAL EXAM:   · Constitutional	Well-developed, well nourished  · Eyes	EOMI; PERRL; no drainage or redness  · ENMT	No oral lesions; no gross abnormalities  · Neck	No bruits; no thyromegaly or nodules  · Respiratory	Normal breath sounds b/l, No RRW  · Cardiovascular	Regular rate & rhythm, normal S1, S2; no murmurs, gallops or rubs; no S3, S4  · Gastrointestinal	Soft, non-tender, no hepatosplenomegaly, normal bowel sounds  · Extremities	No cyanosis, clubbing or edema  · Vascular	Equal and normal pulses (carotid, femoral, dorsalis pedis)  · Neurological	Alert & oriented; no sensory, motor or coordination deficits, normal reflexes

## 2021-01-23 NOTE — ED ADULT TRIAGE NOTE - CHIEF COMPLAINT QUOTE
chest pains had episode of chest pain that lasted about 10 minutes a week ago,denies pain on arrival

## 2021-01-23 NOTE — ED PROVIDER NOTE - PMH
Anxiety and depression    CAD (coronary artery disease)  s/p stents  Diabetes mellitus    Diverticulosis    Hepatitis C    Hyperlipidemia    Hypertension    Lupus

## 2021-01-23 NOTE — ED ADULT NURSE NOTE - PRO INTERPRETER NEED 2
Chief Complaint   Patient presents with   • Hypothyroidism   • Earache       History of Present Illness  52 y.o.female presents for hypothyroidism follow-up and earache.  Pt presents for follow of hypothyroidism; onset several years.  Takes med daily without side effects.  Does feel like maybe her thyroid medicine is not working as well.  Describes her symptoms of hot flashes and wakes up at night thirsty. Has not missed doses.  Denies weight loss, nervousness, thyroid enlargement, fatigue, bowel movements, skin changes, hair nails, oligomenorrhea, vision changes,     Complains of a left earache onset few days.  Positive for some mild rhinorrhea and mild sore throat has noticed a white spot on the back of her throat as well.  No fever no cough.    Hormone replacement therapy follow-up with history of surgical menopause.  On estradiol 1 mg oral daily.  Tolerating well.  Last mammogram August 2019 on file.    Review of Systems   Constitutional: Negative for appetite change, chills, fatigue, fever, unexpected weight gain and unexpected weight loss.   HENT: Positive for ear pain, rhinorrhea and sore throat. Negative for congestion, ear discharge, sinus pressure, sneezing, swollen glands, tinnitus and trouble swallowing.    Eyes: Negative for blurred vision and visual disturbance.   Respiratory: Negative for cough and shortness of breath.    Cardiovascular: Negative for chest pain, palpitations and leg swelling.   Gastrointestinal: Negative for abdominal pain.   Endocrine: Positive for heat intolerance and polydipsia. Negative for cold intolerance, polyphagia and polyuria.   Genitourinary: Negative for difficulty urinating.   Skin: Negative for rash and skin lesions.   Neurological: Negative for dizziness and light-headedness.         [facility]  The following portions of the patient's history were reviewed and updated as appropriate: allergies, current medications, past family history, past medical history, past social  "history, past surgical history and problem list.     Past Medical History:   Diagnosis Date   • Bilateral ovarian cysts    • Hashimoto's thyroiditis    • Hypothyroidism    • Urinary tract infection       Past Surgical History:   Procedure Laterality Date   • HYSTERECTOMY        Allergies   Allergen Reactions   • Sulfa Antibiotics Anaphylaxis   • Codeine Other (See Comments)     Pt states crazy dreams      Social History     Socioeconomic History   • Marital status: Single     Spouse name: Not on file   • Number of children: Not on file   • Years of education: Not on file   • Highest education level: Not on file   Tobacco Use   • Smoking status: Former Smoker     Packs/day: 1.00     Years: 20.00     Pack years: 20.00     Types: Cigarettes     Last attempt to quit: 2016     Years since quittin.0   • Smokeless tobacco: Never Used   Substance and Sexual Activity   • Alcohol use: Yes     Comment: rare   • Drug use: No   • Sexual activity: Yes     Partners: Male     Family History   Problem Relation Age of Onset   • Hypertension Father    • Cancer Brother    • Thyroid disease Daughter             Current Outpatient Medications:   •  B COMPLEX VITAMINS ER PO, Take 1 capsule by mouth., Disp: , Rfl:   •  estradiol (ESTRACE) 1 MG tablet, Take 1 tablet by mouth Daily., Disp: 30 tablet, Rfl: 5  •  furosemide (LASIX) 20 MG tablet, Take 20 mg by mouth 2 (Two) Times a Day., Disp: , Rfl:   •  NP THYROID 60 MG tablet, TAKE 1 TABLET BY MOUTH EVERY DAY, Disp: 30 tablet, Rfl: 5    VITALS:  /80   Pulse 79   Ht 162.6 cm (64\")   Wt 69.9 kg (154 lb)   SpO2 99%   BMI 26.43 kg/m²     Physical Exam   Constitutional: She appears well-developed and well-nourished. No distress.   HENT:   Head: Normocephalic.   Right Ear: External ear and ear canal normal. Tympanic membrane is not injected, not erythematous and not bulging. A middle ear effusion is present.   Left Ear: External ear and ear canal normal. Tympanic membrane is not " injected, not erythematous and not bulging. A middle ear effusion is present.   Nose: Mucosal edema and rhinorrhea present. Right sinus exhibits no maxillary sinus tenderness and no frontal sinus tenderness. Left sinus exhibits no maxillary sinus tenderness and no frontal sinus tenderness.   Mouth/Throat: Posterior oropharyngeal erythema present. No oropharyngeal exudate, posterior oropharyngeal edema or tonsillar abscesses.       Neck: No JVD present. No thyromegaly present.   Cardiovascular: Normal rate and regular rhythm.   Lymphadenopathy:     She has no cervical adenopathy.   Skin: Skin is warm and dry.   Psychiatric: She has a normal mood and affect.   Nursing note and vitals reviewed.      LABS  TSH   Order: [id number]   Status:  Final result   Visible to patient:  No (Not Released)   Next appt:  None   Component  Ref Range & Units 3d ago   TSH  0.450 - 4.500 uIU/mL 7.810High     Resulting Agency [facility]      Narrative   Performed by: [facility]   Performed at:  [facility]  [id number]  : Anand Scherer PhD, Phone:  [phone]  Patient Fasting:  N       Specimen Collected: 01/31/20 09:27 Last Resulted: 02/01/20 05:35 Lab Flowsheet Order Details View Encounter Lab and Collection Details Routing Result History            Other Results from 1/31/2020     T4, Free   Order: [id number]     Status:  Final result   Visible to patient:  No (Not Released)   Next appt:  None   Component  Ref Range & Units 3d ago   Free T4  0.82 - 1.77 ng/dL 0.93    Resulting Agency [facility]      Narrative   Performed by: [facility]   Performed at:  [facility]  [id number]  : Anand Scherer PhD, Phone:  [phone]  Patient Fasting:  N                ASSESSMENT/PLAN  Cassia was seen today for hypothyroidism and earache.    Diagnoses and all orders for this visit:    Hypothyroidism, unspecified type  -     TSH  -     T4, free  Increased np  thyroid to 90 mcg daily; recheck lab 6 weeks.  Encounter for monitoring estrogen replacement therapy following surgical menopause  -     Discontinue: estradiol (ESTRACE) 1 MG tablet; Take 1 tablet by mouth Daily.  -     estradiol (ESTRACE) 1 MG tablet; Take 1 tablet by mouth Daily.    Sore throat  -     POCT rapid strep A  Recommend warm salt water gargles and oral antihistamine Allegra.  Patient does have a history of remote tobacco use.  Advised patient if symptoms do not improve she should schedule a follow-up with me and I would get her over to ENT for further eval.  Middle ear effusion, bilateral  Recommend oral antihistamine Allegra      I discussed the patients findings and my recommendations with patient.  Patient was encouraged to keep me informed of any acute changes, lack of improvement, or any new concerning symptoms.  Patient voiced understanding of all instructions and denied further questions.      FOLLOW-UP  Return in about 6 weeks (around 3/11/2020), or if symptoms worsen or fail to improve.    Electronically signed by:    JEAN Castillo  01/29/2020    EMR Dragon/Transcription Disclaimer:  Much of this encounter note is an electronic transcription/translation of spoken language to printed text.  The electronic translation of spoken language may permit erroneous, or at times, nonsensical words or phrases to be inadvertently transcribed.  Although I have reviewed the note for such errors, some may still exist     English

## 2021-07-19 ENCOUNTER — INPATIENT (INPATIENT)
Facility: HOSPITAL | Age: 76
LOS: 7 days | Discharge: HOME | DRG: 305 | End: 2021-07-27
Attending: FAMILY MEDICINE | Admitting: STUDENT IN AN ORGANIZED HEALTH CARE EDUCATION/TRAINING PROGRAM
Payer: MEDICARE

## 2021-07-19 VITALS
WEIGHT: 220.02 LBS | RESPIRATION RATE: 16 BRPM | SYSTOLIC BLOOD PRESSURE: 186 MMHG | HEIGHT: 68 IN | HEART RATE: 70 BPM | DIASTOLIC BLOOD PRESSURE: 104 MMHG | TEMPERATURE: 99 F

## 2021-07-19 DIAGNOSIS — I25.10 ATHEROSCLEROTIC HEART DISEASE OF NATIVE CORONARY ARTERY WITHOUT ANGINA PECTORIS: ICD-10-CM

## 2021-07-19 DIAGNOSIS — R77.8 OTHER SPECIFIED ABNORMALITIES OF PLASMA PROTEINS: ICD-10-CM

## 2021-07-19 DIAGNOSIS — N44.00 TORSION OF TESTIS, UNSPECIFIED: Chronic | ICD-10-CM

## 2021-07-19 DIAGNOSIS — Z98.89 OTHER SPECIFIED POSTPROCEDURAL STATES: Chronic | ICD-10-CM

## 2021-07-19 DIAGNOSIS — I16.0 HYPERTENSIVE URGENCY: ICD-10-CM

## 2021-07-19 DIAGNOSIS — N40.0 BENIGN PROSTATIC HYPERPLASIA WITHOUT LOWER URINARY TRACT SYMPTOMS: ICD-10-CM

## 2021-07-19 DIAGNOSIS — Z98.49 CATARACT EXTRACTION STATUS, UNSPECIFIED EYE: Chronic | ICD-10-CM

## 2021-07-19 DIAGNOSIS — E11.9 TYPE 2 DIABETES MELLITUS WITHOUT COMPLICATIONS: ICD-10-CM

## 2021-07-19 DIAGNOSIS — F41.9 ANXIETY DISORDER, UNSPECIFIED: ICD-10-CM

## 2021-07-19 DIAGNOSIS — D64.9 ANEMIA, UNSPECIFIED: ICD-10-CM

## 2021-07-19 DIAGNOSIS — Z29.9 ENCOUNTER FOR PROPHYLACTIC MEASURES, UNSPECIFIED: ICD-10-CM

## 2021-07-19 DIAGNOSIS — I66.9 OCCLUSION AND STENOSIS OF UNSPECIFIED CEREBRAL ARTERY: Chronic | ICD-10-CM

## 2021-07-19 DIAGNOSIS — L05.91 PILONIDAL CYST WITHOUT ABSCESS: Chronic | ICD-10-CM

## 2021-07-19 DIAGNOSIS — R09.89 OTHER SPECIFIED SYMPTOMS AND SIGNS INVOLVING THE CIRCULATORY AND RESPIRATORY SYSTEMS: ICD-10-CM

## 2021-07-19 DIAGNOSIS — N18.30 CHRONIC KIDNEY DISEASE, STAGE 3 UNSPECIFIED: ICD-10-CM

## 2021-07-19 DIAGNOSIS — R06.02 SHORTNESS OF BREATH: ICD-10-CM

## 2021-07-19 LAB
ALBUMIN SERPL ELPH-MCNC: 3.5 G/DL — SIGNIFICANT CHANGE UP (ref 3.3–5)
ALP SERPL-CCNC: 35 U/L — LOW (ref 40–120)
ALT FLD-CCNC: 26 U/L — SIGNIFICANT CHANGE UP (ref 12–78)
ANION GAP SERPL CALC-SCNC: 7 MMOL/L — SIGNIFICANT CHANGE UP (ref 5–17)
AST SERPL-CCNC: 24 U/L — SIGNIFICANT CHANGE UP (ref 15–37)
BASOPHILS # BLD AUTO: 0.04 K/UL — SIGNIFICANT CHANGE UP (ref 0–0.2)
BASOPHILS NFR BLD AUTO: 0.5 % — SIGNIFICANT CHANGE UP (ref 0–2)
BILIRUB SERPL-MCNC: 0.2 MG/DL — SIGNIFICANT CHANGE UP (ref 0.2–1.2)
BUN SERPL-MCNC: 27 MG/DL — HIGH (ref 7–23)
CALCIUM SERPL-MCNC: 8.5 MG/DL — SIGNIFICANT CHANGE UP (ref 8.5–10.1)
CHLORIDE SERPL-SCNC: 110 MMOL/L — HIGH (ref 96–108)
CK MB BLD-MCNC: 2.7 % — SIGNIFICANT CHANGE UP (ref 0–3.5)
CK MB CFR SERPL CALC: 2.1 NG/ML — SIGNIFICANT CHANGE UP (ref 0–3.6)
CK SERPL-CCNC: 78 U/L — SIGNIFICANT CHANGE UP (ref 26–308)
CO2 SERPL-SCNC: 25 MMOL/L — SIGNIFICANT CHANGE UP (ref 22–31)
CREAT SERPL-MCNC: 1.6 MG/DL — HIGH (ref 0.5–1.3)
D DIMER BLD IA.RAPID-MCNC: 486 NG/ML DDU — HIGH
EOSINOPHIL # BLD AUTO: 0.25 K/UL — SIGNIFICANT CHANGE UP (ref 0–0.5)
EOSINOPHIL NFR BLD AUTO: 3.3 % — SIGNIFICANT CHANGE UP (ref 0–6)
GLUCOSE SERPL-MCNC: 111 MG/DL — HIGH (ref 70–99)
HCT VFR BLD CALC: 32.6 % — LOW (ref 39–50)
HGB BLD-MCNC: 9.9 G/DL — LOW (ref 13–17)
IMM GRANULOCYTES NFR BLD AUTO: 0.3 % — SIGNIFICANT CHANGE UP (ref 0–1.5)
LIDOCAIN IGE QN: 157 U/L — SIGNIFICANT CHANGE UP (ref 73–393)
LYMPHOCYTES # BLD AUTO: 1.48 K/UL — SIGNIFICANT CHANGE UP (ref 1–3.3)
LYMPHOCYTES # BLD AUTO: 19.5 % — SIGNIFICANT CHANGE UP (ref 13–44)
MCHC RBC-ENTMCNC: 24.8 PG — LOW (ref 27–34)
MCHC RBC-ENTMCNC: 30.4 GM/DL — LOW (ref 32–36)
MCV RBC AUTO: 81.5 FL — SIGNIFICANT CHANGE UP (ref 80–100)
MONOCYTES # BLD AUTO: 0.51 K/UL — SIGNIFICANT CHANGE UP (ref 0–0.9)
MONOCYTES NFR BLD AUTO: 6.7 % — SIGNIFICANT CHANGE UP (ref 2–14)
NEUTROPHILS # BLD AUTO: 5.3 K/UL — SIGNIFICANT CHANGE UP (ref 1.8–7.4)
NEUTROPHILS NFR BLD AUTO: 69.7 % — SIGNIFICANT CHANGE UP (ref 43–77)
NRBC # BLD: 0 /100 WBCS — SIGNIFICANT CHANGE UP (ref 0–0)
NT-PROBNP SERPL-SCNC: 1995 PG/ML — HIGH (ref 0–450)
PLATELET # BLD AUTO: 264 K/UL — SIGNIFICANT CHANGE UP (ref 150–400)
POTASSIUM SERPL-MCNC: 3.7 MMOL/L — SIGNIFICANT CHANGE UP (ref 3.5–5.3)
POTASSIUM SERPL-SCNC: 3.7 MMOL/L — SIGNIFICANT CHANGE UP (ref 3.5–5.3)
PROT SERPL-MCNC: 7 G/DL — SIGNIFICANT CHANGE UP (ref 6–8.3)
RBC # BLD: 4 M/UL — LOW (ref 4.2–5.8)
RBC # FLD: 17.2 % — HIGH (ref 10.3–14.5)
SARS-COV-2 RNA SPEC QL NAA+PROBE: SIGNIFICANT CHANGE UP
SODIUM SERPL-SCNC: 142 MMOL/L — SIGNIFICANT CHANGE UP (ref 135–145)
TROPONIN I SERPL-MCNC: 0.07 NG/ML — HIGH (ref 0.01–0.04)
TROPONIN I SERPL-MCNC: 0.07 NG/ML — HIGH (ref 0.01–0.04)
WBC # BLD: 7.6 K/UL — SIGNIFICANT CHANGE UP (ref 3.8–10.5)
WBC # FLD AUTO: 7.6 K/UL — SIGNIFICANT CHANGE UP (ref 3.8–10.5)

## 2021-07-19 PROCEDURE — 71046 X-RAY EXAM CHEST 2 VIEWS: CPT | Mod: 26

## 2021-07-19 PROCEDURE — 71275 CT ANGIOGRAPHY CHEST: CPT | Mod: 26,MA

## 2021-07-19 PROCEDURE — 99285 EMERGENCY DEPT VISIT HI MDM: CPT

## 2021-07-19 PROCEDURE — 99223 1ST HOSP IP/OBS HIGH 75: CPT | Mod: GC

## 2021-07-19 PROCEDURE — 99223 1ST HOSP IP/OBS HIGH 75: CPT

## 2021-07-19 RX ORDER — HYDRALAZINE HCL 50 MG
100 TABLET ORAL THREE TIMES A DAY
Refills: 0 | Status: DISCONTINUED | OUTPATIENT
Start: 2021-07-19 | End: 2021-07-27

## 2021-07-19 RX ORDER — SODIUM CHLORIDE 9 MG/ML
1000 INJECTION, SOLUTION INTRAVENOUS
Refills: 0 | Status: DISCONTINUED | OUTPATIENT
Start: 2021-07-19 | End: 2021-07-27

## 2021-07-19 RX ORDER — INSULIN LISPRO 100/ML
VIAL (ML) SUBCUTANEOUS
Refills: 0 | Status: DISCONTINUED | OUTPATIENT
Start: 2021-07-19 | End: 2021-07-27

## 2021-07-19 RX ORDER — DEXTROSE 50 % IN WATER 50 %
25 SYRINGE (ML) INTRAVENOUS ONCE
Refills: 0 | Status: DISCONTINUED | OUTPATIENT
Start: 2021-07-19 | End: 2021-07-27

## 2021-07-19 RX ORDER — LANOLIN ALCOHOL/MO/W.PET/CERES
3 CREAM (GRAM) TOPICAL AT BEDTIME
Refills: 0 | Status: DISCONTINUED | OUTPATIENT
Start: 2021-07-19 | End: 2021-07-27

## 2021-07-19 RX ORDER — LABETALOL HCL 100 MG
100 TABLET ORAL
Refills: 0 | Status: DISCONTINUED | OUTPATIENT
Start: 2021-07-19 | End: 2021-07-20

## 2021-07-19 RX ORDER — ENOXAPARIN SODIUM 100 MG/ML
40 INJECTION SUBCUTANEOUS DAILY
Refills: 0 | Status: DISCONTINUED | OUTPATIENT
Start: 2021-07-19 | End: 2021-07-27

## 2021-07-19 RX ORDER — GLUCAGON INJECTION, SOLUTION 0.5 MG/.1ML
1 INJECTION, SOLUTION SUBCUTANEOUS ONCE
Refills: 0 | Status: DISCONTINUED | OUTPATIENT
Start: 2021-07-19 | End: 2021-07-27

## 2021-07-19 RX ORDER — DEXTROSE 50 % IN WATER 50 %
15 SYRINGE (ML) INTRAVENOUS ONCE
Refills: 0 | Status: DISCONTINUED | OUTPATIENT
Start: 2021-07-19 | End: 2021-07-27

## 2021-07-19 RX ORDER — HYDRALAZINE HCL 50 MG
20 TABLET ORAL ONCE
Refills: 0 | Status: COMPLETED | OUTPATIENT
Start: 2021-07-19 | End: 2021-07-19

## 2021-07-19 RX ORDER — DEXTROSE 50 % IN WATER 50 %
12.5 SYRINGE (ML) INTRAVENOUS ONCE
Refills: 0 | Status: DISCONTINUED | OUTPATIENT
Start: 2021-07-19 | End: 2021-07-27

## 2021-07-19 RX ORDER — SODIUM CHLORIDE 9 MG/ML
1000 INJECTION INTRAMUSCULAR; INTRAVENOUS; SUBCUTANEOUS ONCE
Refills: 0 | Status: COMPLETED | OUTPATIENT
Start: 2021-07-19 | End: 2021-07-19

## 2021-07-19 RX ORDER — INSULIN LISPRO 100/ML
VIAL (ML) SUBCUTANEOUS AT BEDTIME
Refills: 0 | Status: DISCONTINUED | OUTPATIENT
Start: 2021-07-19 | End: 2021-07-27

## 2021-07-19 RX ORDER — ACETAMINOPHEN 500 MG
650 TABLET ORAL EVERY 6 HOURS
Refills: 0 | Status: DISCONTINUED | OUTPATIENT
Start: 2021-07-19 | End: 2021-07-27

## 2021-07-19 RX ADMIN — SODIUM CHLORIDE 1000 MILLILITER(S): 9 INJECTION INTRAMUSCULAR; INTRAVENOUS; SUBCUTANEOUS at 17:41

## 2021-07-19 RX ADMIN — Medication 100 MILLIGRAM(S): at 23:48

## 2021-07-19 RX ADMIN — Medication 20 MILLIGRAM(S): at 13:40

## 2021-07-19 NOTE — H&P ADULT - ASSESSMENT
75 y/o M with PMHx of Type 2 DM (not on insulin), CAD s/p stents 4 years ago (on plavix), Lupus, HTN, HLD, CKD stage 3, HepC, diverticulosis, history of blood clots in brain (s/p surgery 2013) presented to the ED complaining of shortness of breath admitted for dyspnea likely due to uncontrolled HTN and elevated troponin. 77 y/o M with PMHx of Type 2 DM (not on insulin), CAD s/p stents >4 years ago (on plavix), Lupus, HTN, HLD, CKD stage 3, HepC, diverticulosis, history of blood clots in brain (s/p surgery 2013) presented to the ED complaining of shortness of breath admitted for dyspnea likely due to uncontrolled HTN and elevated troponin. 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

## 2021-07-19 NOTE — H&P ADULT - PROBLEM SELECTOR PLAN 7
Cr is improved from last admission  suspect secondary to longstanding HTN +/- DM type 2  will continue home medications for BP  continue glycemic control with insulin coverage  s/p 1L NS bolus in the ED - hold off further IV fluids  monitor renal indices, avoid nephrotoxic agents

## 2021-07-19 NOTE — ED ADULT NURSE REASSESSMENT NOTE - NS ED NURSE REASSESS COMMENT FT1
Received patient resting in stretcher. A/Ox4. NAD Noted at this time. Telemetry monitor on. Patient speaking clearly. Denies headache, dizziness, CP or SOB.

## 2021-07-19 NOTE — H&P ADULT - NSHPSOCIALHISTORY_GEN_ALL_CORE
Lives in adult group home.  Independent in ADLs.  Former smoker, 15 pack-year history, quit in 1980.  Denies current EtOH or illicit drug use. Formerly used street drugs. Lives in adult group home.  Independent in ADLs.  Former smoker, 15 pack-year history, quit in 1980.  Denies current EtOH or illicit drug use. Formerly used street drugs.  Received both doses of COVID vaccine

## 2021-07-19 NOTE — CONSULT NOTE ADULT - SUBJECTIVE AND OBJECTIVE BOX
DOCUMENTATION IN PROGRESS   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 (s/p surgery 2013) presented to the ED with        Vital Signs T(F): 98.6 HR: 78 BP: 178/83 RR: 16 SpO2: --  Labs remarkable for H/H 9.9/32  BUN 27, Creat 1.60 Trop I .068, BNP 1995, D dimer 486,    Allergies    No Known Allergies    Intolerances      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  Had surgery ( April 2013 )    S/P tonsillectomy    S/P arthroscopic knee surgery  Bilateral ( 2005 )    Torsion of testicle  Had surgery at age 14    Pilonidal cyst  Had surgery ( 1969 )    S/P cataract surgery  Bilateral    H/O hernia repair      MEDICATIONS  (STANDING):  sodium chloride 0.9% Bolus 1000 milliLiter(s) IV Bolus once    MEDICATIONS  (PRN):    FAMILY HISTORY:  FHx: throat cancer    No family history of colorectal cancer      SOCIAL HISTORY    Marital Status:   Occupation:   Lives with:     SUBSTANCE USE  Tobacco Usage:  ( ) None ( ) never smoked   ( ) former smoker  ( ) current smoker; Packs per day:   Alcohol Usage: ( ) none  ( ) occasional ( ) 2-3 times a week ( ) daily; Last drink:   Recreational drugs ( ) None    REVIEW OF SYSTEMS   CONSTITUTIONAL: No fevers, No chills, No fatigue, No weight gain  EYES: No vision changes, No vertigo, No throat pain   ENT: No congestion, No ear pain, No sore throat.  NECK: No pain, No stiffness  RESPIRATORY: No shortness of breath, No cough, No wheezing, No hemoptysis  CARDIOVASCULAR: No chest pain. No palpitations, No VALDEZ, No orthopnea, No PND, No pleuritic pain  GASTROINTESTINAL: No abdominal pain, No nausea, No vomiting, No hematemesis, No diarrhea No constipation. No melena  GENITOURINARY: No dysuria, No frequency, No incontinence, No hematuria  NEUROLOGICAL: No dizziness, No lightheadedness, No syncope, No LOC, No headache, No numbness, No weakness  MUSCULOSKELETAL: No joint pain, No joint swelling.  PSYCHIATRIC: No anxiety, No depression  DERMATOLOGY: No diaphoresis. No itching, No rashes, No pressure ulcers  HEME/LYMPH: No easy bruising, or bleeding gums  All other review of systems is negative unless indicated above.    VITAL SIGNS:   Vital Signs Last 24 Hrs  T(C): 37 (19 Jul 2021 12:11), Max: 37 (19 Jul 2021 12:11)  T(F): 98.6 (19 Jul 2021 12:11), Max: 98.6 (19 Jul 2021 12:11)  HR: 78 (19 Jul 2021 14:39) (70 - 78)  BP: 178/83 (19 Jul 2021 14:39) (178/83 - 186/104)  BP(mean): --  RR: 16 (19 Jul 2021 12:11) (16 - 16)  SpO2: --    Physical Exam:    Appearance: NAD, no distress, alert,   HEENT: Moist Mucous Membranes, Anicteric  Cardiovascular: Regular rate and rhythm, Normal S1 S2, No JVD, No murmurs, No rubs, gallops or clicks  Respiratory: Lungs clear to auscultation. No rales, No rhonchi, No wheezing. No tenderness to palpation  Gastrointestinal:  Soft, Non-tender, + BS  Neurologic: Non-focal  Skin: Warm and dry, No rashes, No ecchymosis, No cyanosis, No ulcers   Musculoskeletal: No clubbing, No cyanosis  Vascular: Peripheral pulses palpable 2+ bilaterally  Psychiatry: Mood & affect appropriate  Lymph: No peripheral edema.     I&O's Summary      LABS: All Labs Reviewed:                        9.9    7.60  )-----------( 264      ( 19 Jul 2021 13:35 )             32.6     19 Jul 2021 13:35    142    |  110    |  27     ----------------------------<  111    3.7     |  25     |  1.60     Ca    8.5        19 Jul 2021 13:35    TPro  7.0    /  Alb  3.5    /  TBili  0.2    /  DBili  x      /  AST  24     /  ALT  26     /  AlkPhos  35     19 Jul 2021 13:35      Troponin I, Serum: .068 ng/mL (07-19-21 @ 13:35)    Serum Pro-Brain Natriuretic Peptide: 1995 pg/mL (07-19-21 @ 13:35)    Blood Culture:         D-Dimer Assay, Quantitative: 486 ng/mL DDU (07-19-21 @ 13:37)           [facility] - Fifi Bliss Grossman, [place], Abraham Sheridan, Loc Angulo  Office Number: [phone]    Initial Consult Note  CHIEF COMPLAINT: Patient is a 76y old  Male who presents with a chief complaint of SOB HTN  HPI: 79 y/o M with PMHx of NIDDM2, CAD s/p stents 4 years ago (on plavix), mild AS, Lupus, HTN, HepC, HLD, diverticulosis, 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 He reports that he is compliant with all of his medications however, he has been taking  extra labetalol doses on his own in an effort to improve his elevated BP. In addition, for the past 2 days he noticed feeling SOB. He denies any chest pain, dizziness, palpitations. He no longer follows with his cardiologist. He reports cramping to his lower extremities but does not report swelling.         Vital Signs T(F): 98.6 HR: 78 BP: 178/83 RR: 16 SpO2:   Labs remarkable for H/H 9.9/32  BUN 27, Creat 1.60 Trop I .068, BNP 1995, D dimer 486,  EKG SR 1st degree HB @ 73 bpm Incomplete RBBB    Allergies    No Known Allergies    Intolerances      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  Had surgery ( April 2013 )    S/P tonsillectomy    S/P arthroscopic knee surgery  Bilateral ( 2005 )    Torsion of testicle  Had surgery at age 14    Pilonidal cyst  Had surgery ( 1969 )    S/P cataract surgery  Bilateral    H/O hernia repair      MEDICATIONS  (STANDING):  sodium chloride 0.9% Bolus 1000 milliLiter(s) IV Bolus once    MEDICATIONS  (PRN):    FAMILY HISTORY:  FHx: throat cancer    No family history of colorectal cancer      SOCIAL HISTORY    Marital Status:   Occupation:   Lives with:     SUBSTANCE USE  Tobacco Usage:  ( ) None ( ) never smoked   ( x) former smoker  ( ) current smoker; Packs per day:   Alcohol Usage: ( x) none  ( ) occasional ( ) 2-3 times a week ( ) daily; Last drink:   Recreational drugs (x ) None    REVIEW OF SYSTEMS   CONSTITUTIONAL: No fevers, No chills, No fatigue, No weight gain  EYES: No vision changes, No vertigo, No throat pain   ENT: No congestion, No ear pain, No sore throat.  NECK: No pain, No stiffness  RESPIRATORY: + shortness of breath, No cough, No wheezing, No hemoptysis  CARDIOVASCULAR: No chest pain. No palpitations, No VALDEZ, No orthopnea, No PND, No pleuritic pain  GASTROINTESTINAL: No abdominal pain, No nausea, No vomiting, No hematemesis, No diarrhea No constipation. No melena  GENITOURINARY: No dysuria, No frequency, No incontinence, No hematuria  NEUROLOGICAL: No dizziness, No lightheadedness, No syncope, No LOC, No headache, No numbness, No weakness  MUSCULOSKELETAL: No joint pain, No joint swelling.  PSYCHIATRIC: No anxiety, No depression  DERMATOLOGY: No diaphoresis. No itching, No rashes, No pressure ulcers  HEME/LYMPH: No easy bruising, or bleeding gums  All other review of systems is negative unless indicated above.    VITAL SIGNS:   Vital Signs Last 24 Hrs  T(C): 37 (19 Jul 2021 12:11), Max: 37 (19 Jul 2021 12:11)  T(F): 98.6 (19 Jul 2021 12:11), Max: 98.6 (19 Jul 2021 12:11)  HR: 78 (19 Jul 2021 14:39) (70 - 78)  BP: 178/83 (19 Jul 2021 14:39) (178/83 - 186/104)  BP(mean): --  RR: 16 (19 Jul 2021 12:11) (16 - 16)  SpO2: --    Physical Exam:    Appearance: NAD, no distress, alert, Well developed   HEENT: Moist Mucous Membranes, Anicteric  Cardiovascular: Regular rate and rhythm, Normal S1 S2, No JVD, +systolic  murmur, No rubs, gallops or clicks  Respiratory: Lungs clear to auscultation. No rales, No rhonchi, No wheezing. No tenderness to palpation  Gastrointestinal:  Soft, Non-tender, + BS  Neurologic: Non-focal  Skin: Warm and dry, No rashes, No ecchymosis, No cyanosis, No ulcers   Musculoskeletal: No clubbing, No cyanosis  Vascular: Peripheral pulses palpable 2+ bilaterally  Psychiatry: Mood & affect appropriate  Lymph: Trace peripheral edema.     I&O's Summary      LABS: All Labs Reviewed:                        9.9    7.60  )-----------( 264      ( 19 Jul 2021 13:35 )             32.6     19 Jul 2021 13:35    142    |  110    |  27     ----------------------------<  111    3.7     |  25     |  1.60     Ca    8.5        19 Jul 2021 13:35    TPro  7.0    /  Alb  3.5    /  TBili  0.2    /  DBili  x      /  AST  24     /  ALT  26     /  AlkPhos  35     19 Jul 2021 13:35      Troponin I, Serum: .068 ng/mL (07-19-21 @ 13:35)  Serum Pro-Brain Natriuretic Peptide: 1995 pg/mL (07-19-21 @ 13:3  D-Dimer Assay, Quantitative: 486 ng/mL DDU (07-19-21 @ 13:37)      < from: TTE Echo Complete w/o Contrast w/ Doppler (11.30.20 @ 11:33) >   EXAM:  ECHO TTE WO CON COMP W DOPP    PROCEDURE DATE:  11/30/2020    INTERPRETATION:  INDICATION: Chest pain  Sonographer AS  Blood Pressure 183/75    Height 172.7 cm     Weight 99.8 kg       BSA 2.13 sq m  Dimensions:  LA 4.0       Normal Values: 2.0 - 4.0 cm  Ao 3.6        Normal Values: 2.0 - 3.8 cm  SEPTUM 1.4       Normal Values: 0.6 - 1.2 cm  PWT 1.2       Normal Values: 0.6 - 1.1 cm  LVIDd 5.8         Normal Values: 3.0 - 5.6 cm  LVIDs 3.6         Normal Values: 1.8 - 4.0 cm  OBSERVATIONS:  Technically difficult study  Mitral Valve: Thickened leaflets with mild MR.  Aortic Valve/Aorta: Calcified trileaflet aortic valve with decreased opening. Peak transaortic valve gradient is 18.4 mmHg with a mean transaortic valvegradient of 9.9 mmHg. The aortic valve area is calculated to be 1.87 sq cm by continuity equation. This consistent with mild aortic stenosis.  Tricuspid Valve: normal with trace TR.  Pulmonic Valve: Not well-visualized  Left Atrium: Dilated  Right Atrium: Not well-visualized  Left Ventricle: normal LV size and systolic function, estimated LVEF of 65%. Mild LVH  Right Ventricle: Grossly normal size and systolic function.  Pericardium/Pleura: normal, no significant pericardial effusion.  LV diastolic dysfunction is present    Conclusion:  Technically difficult study  Mild concentric LVH with normal internal dimensions and systolic function, estimated LVEF of 65%.  Grossly normal RV size and systolic function.  Left atrial enlargement  Calcified trileaflet aortic valve with mild aortic stenosis  Mild MR  Trace TR.  No significant pericardial effusion.    < end of copied text >    < from: CT Angio Chest PE Protocol w/ IV Cont (07.19.21 @ 15:31) >  EXAM:  CT ANGIO CHEST PULM ART [facility]                        PROCEDURE DATE:  07/19/2021    INTERPRETATION:  CLINICAL INFORMATION: Hypertension and shortness of breath  COMPARISON: None.  CONTRAST/COMPLICATIONS:  IV Contrast: [id number]  90 cc administered   10 cc discarded  Oral Contrast: NONE  Complications: None reported at time of study completion  PROCEDURE:  CT Angiography of the Chest.  Sagittal and coronal reformats were performed as well as 3D (MIP) reconstructions.  FINDINGS:  LUNGS AND AIRWAYS: Patent central airways.  Dependent and basilar atelectasis. Otherwise clear lungs.  PLEURA: No pleural effusion.  MEDIASTINUM AND SANDIP: No lymphadenopathy.  VESSELS: Atherosclerotic calcifications of thoracic aorta and coronary arteries. Multiple images are degraded by respiratory motion. Excluding regions with motion artifact, there are no pulmonary arterial filling defects identified.  HEART: Cardiomegaly. Aortic valve calcifications. No pericardial effusion. Right pericardial cyst.  CHEST WALL AND LOWER NECK: Within normal limits.  VISUALIZED UPPER ABDOMEN: Probable right renal cysts.  BONES: Degenerative changes. Chronic left rib deformities.  IMPRESSION:  Negative for pulmonary embolism.  --- End of Report ---    < end of copied text >         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 He reports that he is compliant with all of his medications however, he has been taking  extra labetalol doses on his own in an effort to improve his elevated BP. In addition, for the past 2 days he noticed feeling SOB. He denies any chest pain, dizziness, palpitations. He no longer follows with his cardiologist. He reports cramping to his lower extremities but does not report swelling. His /83 and he received Hydralazine 20 mg IVP x 1.        Vital Signs T(F): 98.6 HR: 78 BP: 178/83 RR: 16 SpO2:   Labs remarkable for H/H 9.9/32  BUN 27, Creat 1.60 Trop I .068, BNP 1995, D dimer 486,  EKG SR 1st degree HB @ 73 bpm Incomplete RBBB    Allergies    No Known Allergies    Intolerances      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  Had surgery ( April 2013 )    S/P tonsillectomy    S/P arthroscopic knee surgery  Bilateral ( 2005 )    Torsion of testicle  Had surgery at age 14    Pilonidal cyst  Had surgery ( 1969 )    S/P cataract surgery  Bilateral    H/O hernia repair      MEDICATIONS  (STANDING):  sodium chloride 0.9% Bolus 1000 milliLiter(s) IV Bolus once    MEDICATIONS  (PRN):    FAMILY HISTORY:  FHx: throat cancer    No family history of colorectal cancer      SOCIAL HISTORY    Marital Status:   Occupation:   Lives with:     SUBSTANCE USE  Tobacco Usage:  ( ) None ( ) never smoked   ( x) former smoker  ( ) current smoker; Packs per day:   Alcohol Usage: ( x) none  ( ) occasional ( ) 2-3 times a week ( ) daily; Last drink:   Recreational drugs (x ) None    REVIEW OF SYSTEMS   CONSTITUTIONAL: No fevers, No chills, No fatigue, No weight gain  EYES: No vision changes, No vertigo, No throat pain   ENT: No congestion, No ear pain, No sore throat.  NECK: No pain, No stiffness  RESPIRATORY: + shortness of breath, No cough, No wheezing, No hemoptysis  CARDIOVASCULAR: No chest pain. No palpitations, No VALDEZ, No orthopnea, No PND, No pleuritic pain  GASTROINTESTINAL: No abdominal pain, No nausea, No vomiting, No hematemesis, No diarrhea No constipation. No melena  GENITOURINARY: No dysuria, No frequency, No incontinence, No hematuria  NEUROLOGICAL: No dizziness, No lightheadedness, No syncope, No LOC, No headache, No numbness, No weakness  MUSCULOSKELETAL: No joint pain, No joint swelling.  PSYCHIATRIC: No anxiety, No depression  DERMATOLOGY: No diaphoresis. No itching, No rashes, No pressure ulcers  HEME/LYMPH: No easy bruising, or bleeding gums  All other review of systems is negative unless indicated above.    VITAL SIGNS:   Vital Signs Last 24 Hrs  T(C): 37 (19 Jul 2021 12:11), Max: 37 (19 Jul 2021 12:11)  T(F): 98.6 (19 Jul 2021 12:11), Max: 98.6 (19 Jul 2021 12:11)  HR: 78 (19 Jul 2021 14:39) (70 - 78)  BP: 178/83 (19 Jul 2021 14:39) (178/83 - 186/104)  BP(mean): --  RR: 16 (19 Jul 2021 12:11) (16 - 16)  SpO2: --    Physical Exam:    Appearance: NAD, no distress, alert, Well developed   HEENT: Moist Mucous Membranes, Anicteric  Cardiovascular: Regular rate and rhythm, Normal S1 S2, No JVD, +systolic  murmur, No rubs, gallops or clicks  Respiratory: Lungs clear to auscultation. No rales, No rhonchi, No wheezing. No tenderness to palpation  Gastrointestinal:  Soft, Non-tender, + BS  Neurologic: Non-focal  Skin: Warm and dry, No rashes, No ecchymosis, No cyanosis, No ulcers   Musculoskeletal: No clubbing, No cyanosis  Vascular: Peripheral pulses palpable 2+ bilaterally  Psychiatry: Mood & affect appropriate  Lymph: Trace peripheral edema.     I&O's Summary      LABS: All Labs Reviewed:                        9.9    7.60  )-----------( 264      ( 19 Jul 2021 13:35 )             32.6     19 Jul 2021 13:35    142    |  110    |  27     ----------------------------<  111    3.7     |  25     |  1.60     Ca    8.5        19 Jul 2021 13:35    TPro  7.0    /  Alb  3.5    /  TBili  0.2    /  DBili  x      /  AST  24     /  ALT  26     /  AlkPhos  35     19 Jul 2021 13:35      Troponin I, Serum: .068 ng/mL (07-19-21 @ 13:35)  Serum Pro-Brain Natriuretic Peptide: 1995 pg/mL (07-19-21 @ 13:3  D-Dimer Assay, Quantitative: 486 ng/mL DDU (07-19-21 @ 13:37)      < from: TTE Echo Complete w/o Contrast w/ Doppler (11.30.20 @ 11:33) >   EXAM:  ECHO TTE WO CON COMP W DOPP    PROCEDURE DATE:  11/30/2020    INTERPRETATION:  INDICATION: Chest pain  Sonographer AS  Blood Pressure 183/75    Height 172.7 cm     Weight 99.8 kg       BSA 2.13 sq m  Dimensions:  LA 4.0       Normal Values: 2.0 - 4.0 cm  Ao 3.6        Normal Values: 2.0 - 3.8 cm  SEPTUM 1.4       Normal Values: 0.6 - 1.2 cm  PWT 1.2       Normal Values: 0.6 - 1.1 cm  LVIDd 5.8         Normal Values: 3.0 - 5.6 cm  LVIDs 3.6         Normal Values: 1.8 - 4.0 cm  OBSERVATIONS:  Technically difficult study  Mitral Valve: Thickened leaflets with mild MR.  Aortic Valve/Aorta: Calcified trileaflet aortic valve with decreased opening. Peak transaortic valve gradient is 18.4 mmHg with a mean transaortic valvegradient of 9.9 mmHg. The aortic valve area is calculated to be 1.87 sq cm by continuity equation. This consistent with mild aortic stenosis.  Tricuspid Valve: normal with trace TR.  Pulmonic Valve: Not well-visualized  Left Atrium: Dilated  Right Atrium: Not well-visualized  Left Ventricle: normal LV size and systolic function, estimated LVEF of 65%. Mild LVH  Right Ventricle: Grossly normal size and systolic function.  Pericardium/Pleura: normal, no significant pericardial effusion.  LV diastolic dysfunction is present    Conclusion:  Technically difficult study  Mild concentric LVH with normal internal dimensions and systolic function, estimated LVEF of 65%.  Grossly normal RV size and systolic function.  Left atrial enlargement  Calcified trileaflet aortic valve with mild aortic stenosis  Mild MR  Trace TR.  No significant pericardial effusion.    < end of copied text >    < from: CT Angio Chest PE Protocol w/ IV Cont (07.19.21 @ 15:31) >  EXAM:  CT ANGIO CHEST PULM ART [facility]                        PROCEDURE DATE:  07/19/2021    INTERPRETATION:  CLINICAL INFORMATION: Hypertension and shortness of breath  COMPARISON: None.  CONTRAST/COMPLICATIONS:  IV Contrast: [id number]  90 cc administered   10 cc discarded  Oral Contrast: NONE  Complications: None reported at time of study completion  PROCEDURE:  CT Angiography of the Chest.  Sagittal and coronal reformats were performed as well as 3D (MIP) reconstructions.  FINDINGS:  LUNGS AND AIRWAYS: Patent central airways.  Dependent and basilar atelectasis. Otherwise clear lungs.  PLEURA: No pleural effusion.  MEDIASTINUM AND SANDIP: No lymphadenopathy.  VESSELS: Atherosclerotic calcifications of thoracic aorta and coronary arteries. Multiple images are degraded by respiratory motion. Excluding regions with motion artifact, there are no pulmonary arterial filling defects identified.  HEART: Cardiomegaly. Aortic valve calcifications. No pericardial effusion. Right pericardial cyst.  CHEST WALL AND LOWER NECK: Within normal limits.  VISUALIZED UPPER ABDOMEN: Probable right renal cysts.  BONES: Degenerative changes. Chronic left rib deformities.  IMPRESSION:  Negative for pulmonary embolism.  --- End of Report ---    < end of copied text >

## 2021-07-19 NOTE — ED ADULT TRIAGE NOTE - CHIEF COMPLAINT QUOTE
pt to ED via ambulance from group home sent for evaluation of hypertension for 6 weeks  pt currently on BP medications

## 2021-07-19 NOTE — H&P ADULT - PROBLEM SELECTOR PLAN 5
stable, was admitted in the past for GI bleed  no evidence of bleeding at present  monitor H/H, check iron studies, B12 and folate  will continue with lovenox for now

## 2021-07-19 NOTE — ED PROVIDER NOTE - CARE PROVIDER_API CALL
Daryl Monterroso)  Cardiovascular Disease; Internal Medicine  [address], [place] [id number]  Phone: [phone]  Fax: [phone]  Follow Up Time: 1-3 Days

## 2021-07-19 NOTE — ED PROVIDER NOTE - NSFOLLOWUPINSTRUCTIONS_ED_ALL_ED_FT
[employer]® CareNotes®     :  [facility]  	                       HYPERTENSION - AfterCare(R) Instructions(ER/ED)           Hypertension    WHAT YOU NEED TO KNOW:    Hypertension is high blood pressure. Your blood pressure is the force of your blood moving against the walls of your arteries. Hypertension causes your blood pressure to get so high that your heart has to work much harder than normal. This can damage your heart. The cause of hypertension may not be known. This is called essential or primary hypertension. Hypertension caused by another medical condition, such as kidney disease, is called secondary hypertension.    DISCHARGE INSTRUCTIONS:    Call your local emergency number (911 in the ) or have someone call if:   •You have chest pain.      •You have any of the following signs of a heart attack: ?Squeezing, pressure, or pain in your chest      ?You may also have any of the following: ?Discomfort or pain in your back, neck, jaw, stomach, or arm      ?Shortness of breath      ?Nausea or vomiting      ?Lightheadedness or a sudden cold sweat        •You become confused or have trouble speaking.      •You suddenly feel lightheaded or have trouble breathing.      Return to the emergency department if:   •You have a severe headache or vision loss.      •You have weakness in an arm or leg.      Call your doctor or cardiologist if:   •You feel faint, dizzy, confused, or drowsy.      •You have been taking your blood pressure medicine but your pressure is higher than your provider says it should be.      •You have questions or concerns about your condition or care.      Medicines: You may need any of the following:  •Antihypertensives may be used to help lower your blood pressure. Several kinds of medicines are available. Your healthcare provider will choose medicines based on the kind of hypertension you have. You may need more than one type of medicine. Take the medicine exactly as directed.      •Diuretics help decrease extra fluid that collects in your body. This will help lower your blood pressure. You may urinate more often while you take this medicine.      •Cholesterol medicine helps lower your cholesterol level. A low cholesterol level helps prevent heart disease and makes it easier to control your blood pressure.      •Take your medicine as directed. Contact your healthcare provider if you think your medicine is not helping or if you have side effects. Tell him or her if you are allergic to any medicine. Keep a list of the medicines, vitamins, and herbs you take. Include the amounts, and when and why you take them. Bring the list or the pill bottles to follow-up visits. Carry your medicine list with you in case of an emergency.      Follow up with your doctor or cardiologist as directed: You will need to return to have your blood pressure checked and to have other lab tests done. Write down your questions so you remember to ask them during your visits.    Stages of hypertension:     Blood Pressure Readings     •Normal blood pressure is 119/79 or lower. Your healthcare provider may only check your blood pressure each year if it stays at a normal level.      •Elevated blood pressure is 120/79 to 129/79. This is sometimes called prehypertension. Your healthcare provider may suggest lifestyle changes to help lower your blood pressure to a normal level. He or she may then check it again in 3 to 6 months.      •Stage 1 hypertension is 130/80 to 139/89. Your provider may recommend lifestyle changes, medication, and checks every 3 to 6 months until your blood pressure is controlled.      •Stage 2 hypertension is 140/90 or higher. Your provider will recommend lifestyle changes and have you take 2 kinds of hypertension medicines. You will also need to have your blood pressure checked monthly until it is controlled.      Manage hypertension:   •Check your blood pressure at home. Avoid smoking, caffeine, and exercise at least 30 minutes before checking your blood pressure. Sit and rest for 5 minutes before you take your blood pressure. Extend your arm and support it on a flat surface. Your arm should be at the same level as your heart. Follow the directions that came with your blood pressure monitor. Check your blood pressure 2 times, 1 minute apart, before you take your medicine in the morning. Also check your blood pressure before your evening meal. Keep a record of your readings and bring it to your follow-up visits. Ask your healthcare provider what your blood pressure should be.  How to take a Blood Pressure           •Manage any other health conditions you have. Health conditions such as diabetes can increase your risk for hypertension. Follow your healthcare provider's instructions and take all your medicines as directed.      •Ask about all medicines. Certain medicines can increase your blood pressure. Examples include oral birth control pills, decongestants, herbal supplements, and NSAIDs, such as ibuprofen. Your healthcare provider can tell you which medicines are safe for you to take. This includes prescription and over-the-counter medicines.      Lifestyle changes you can make to manage hypertension: Your healthcare provider may recommend you work with a team to manage hypertension. The team may include medical experts such as a dietitian, an exercise or physical therapist, and a behavior therapist. Your family members may be included in helping you create lifestyle changes.    Ways to Lower Your Blood Pressure     •Limit sodium (salt) as directed. Too much sodium can affect your fluid balance. Check labels to find low-sodium or no-salt-added foods. Some low-sodium foods use potassium salts for flavor. Too much potassium can also cause health problems. Your healthcare provider will tell you how much sodium and potassium are safe for you to have in a day. He or she may recommend that you limit sodium to 2,300 mg a day.             •Follow the meal plan recommended by your healthcare provider. A dietitian or your provider can give you more information on low-sodium plans or the DASH (Dietary Approaches to Stop Hypertension) eating plan. The DASH plan is low in sodium, processed sugar, unhealthy fats, and total fat. It is high in potassium, calcium, and fiber. These can be found in vegetables, fruit, and whole-grain foods.             •Be physically active throughout the day. Physical activity, such as exercise, can help control your blood pressure and your weight. Be physically active for at least 30 minutes per day, on most days of the week. Include aerobic activity, such as walking or riding a bicycle. Also include strength training at least 2 times each week. Your healthcare providers can help you create a physical activity plan.   Family Walking for Exercise       Strength Training for Adults           •Decrease stress. This may help lower your blood pressure. Learn ways to relax, such as deep breathing or listening to music.      •Limit alcohol as directed. Alcohol can increase your blood pressure. A drink of alcohol is 12 ounces of beer, 5 ounces of wine, or 1½ ounces of liquor.      •Do not smoke. Nicotine and other chemicals in cigarettes and cigars can increase your blood pressure and also cause lung damage. Ask your healthcare provider for information if you currently smoke and need help to quit. E-cigarettes or smokeless tobacco still contain nicotine. Talk to your healthcare provider before you use these products.  Prevent Heart Disease               © Copyright [employer] 2021           back to top                          © Copyright [employer] 2021

## 2021-07-19 NOTE — PATIENT PROFILE ADULT - FALL HARM RISK
Arthritis/bones(Osteoporosis,prev fx,steroid use,metastatic bone ca)/coagulation(Bleeding disorder R/T clinical cond/anti-coags)

## 2021-07-19 NOTE — CONSULT NOTE ADULT - ASSESSMENT
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     SOB  - Patient with history HTN and CAD presents with SOB x 2 days  - BNP:  <--1995 No meaningful evidence of volume overload,  - ECHO showed normal LV and RV  size and systolic function, estimated LVEF of 65%. Mild LVH Mild AS mild MR  - CTA chest negative for PE  - No meaningful evidence of volume overload,   - Continue Labetalol, Amlodipine  - Continue afterload reduction with hydralazine  - Low sodium DASH diet  - Daily weight       - Patient close to euvolemia now.   - Has baseline LE edema which is unchanged  - Has B/L lower extremity edema which may be likely related to poor nutritional status.   - He may have small degree of volume overload but his SOB/desat is likely related to his pulmonary process and his compliance with supplemental O2.   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     SOB  - Patient with history HTN and CAD presents with SOB x 2 days  - BNP:  <--1995 No meaningful evidence of volume overload, may be elevated in setting of prolonged HTN  - ECHO 11/30/21 showed normal LV and RV  size and systolic function, estimated LVEF of 65%. Mild LVH Mild AS mild MR  - CTA chest negative for PE  - Continue Labetalol, Amlodipine, Hydralazine  - Low sodium DASH diet  - Daily weight     Elevated Troponin  - Denies chest pain presently with history CAD s/p stents  - Troponin I: <--.068 trend until peak  - EKG SR 1st degree HB @ 73 bpm Incomplete RBBB unchanged from previous  - Repeat echo  - Continue Plavix 75 mg QD  - Continue statin     HTN  - BP: 179/83 (07-19-21 @ 16:00) (178/83 - 186/104)  - Continue Labetalol 100 mg BID , Amlodipine 5 mg QD, Hydralazine 50 mg Q8h  - If BP elevated titrate up on hydralazine    LE calf pain  - Obtain venous doppler b/l LE         - Monitor and replete lytes, keep K>4, Mg>2.  - All other medical needs as per primary team.  - Other cardiovascular workup will depend on clinical course.  - Will continue to follow.    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     SOB  - Patient with history HTN and CAD presents with SOB x 2 days  - BNP:  <--1995 No meaningful evidence of volume overload, may be elevated in setting of prolonged HTN  - ECHO 11/30/21 showed normal LV and RV  size and systolic function, estimated LVEF of 65%. Mild LVH Mild AS mild MR  - CTA chest negative for PE  - Continue Labetalol, Amlodipine, Hydralazine  - Low sodium DASH diet  - Daily weight     Elevated Troponin  - Denies chest pain presently with history CAD s/p stents  - Troponin I: <--.068 trend until peak  - EKG SR 1st degree HB @ 73 bpm Incomplete RBBB unchanged from previous  - Repeat echo  - Continue Plavix 75 mg QD  - Continue statin     HTN  - BP: 179/83 (07-19-21 @ 16:00) (178/83 - 186/104)  - Received Hydralazine 20 mg IV x 1 in ED  - Continue Labetalol 100 mg BID , Amlodipine 5 mg QD, Hydralazine 50 mg Q8h  - If BP elevated titrate up on hydralazine    LE calf pain  - Obtain venous doppler b/l LE         - Monitor and replete lytes, keep K>4, Mg>2.  - All other medical needs as per primary team.  - Other cardiovascular workup will depend on clinical course.  - Will continue to follow.    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     SOB  - Patient with history HTN and CAD presents with SOB x 2 days  - BNP:  <--1995 No meaningful evidence of volume overload, may be elevated in setting of prolonged HTN  - ECHO 11/30/21 showed normal LV and RV  size and systolic function, estimated LVEF of 65%. Mild LVH Mild AS mild MR  - CTA chest negative for PE  - Continue Labetalol, Amlodipine, Hydralazine  - Low sodium DASH diet  - Daily weight     Elevated Troponin  - Denies chest pain presently with history CAD s/p stents  - Troponin I: <--.068 trend until peak  - EKG SR 1st degree HB @ 73 bpm Incomplete RBBB unchanged from previous  - check echo  - Continue Plavix 75 mg QD  - Continue statin     HTN  - BP: 179/83 (07-19-21 @ 16:00) (178/83 - 186/104)  - Received Hydralazine 20 mg IV x 1 in ED  - Continue Labetalol 100 mg BID , Amlodipine 5 mg QD, Hydralazine 50 mg Q8h  - If BP elevated titrate up on hydralazine    LE calf pain  - Obtain venous doppler b/l LE     - Monitor and replete lytes, keep K>4, Mg>2.  - All other medical needs as per primary team.  - Other cardiovascular workup will depend on clinical course.  - Will continue to follow.    Bernice Arora, MS ANP, AGACNP  Nurse Practitioner- Cardiology   Spectra #[phone]

## 2021-07-19 NOTE — H&P ADULT - NSHPOUTPATIENTPROVIDERS_GEN_ALL_CORE
PMD: Dr. Erich Man (cell# [phone])  Cardio: Dr. Burciaga  Psych: PONCHO Ventura PMD: Dr. Erich Man (cell# [phone])  Cardio: does not see one (to follow up with Dr. Monterroso)  Psych: PONCHO Ventura

## 2021-07-19 NOTE — ED ADULT NURSE NOTE - NSIMPLEMENTINTERV_GEN_ALL_ED
Implemented All Universal Safety Interventions:  [place] to call system. Call bell, personal items and telephone within reach. Instruct patient to call for assistance. Room bathroom lighting operational. Non-slip footwear when patient is off stretcher. Physically safe environment: no spills, clutter or unnecessary equipment. Stretcher in lowest position, wheels locked, appropriate side rails in place.

## 2021-07-19 NOTE — H&P ADULT - HISTORY OF PRESENT ILLNESS
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 **CHARTING IN PROGRESS**     In the ED,  Vital Signs T(F) Max: 99.1 BP initially 186/104 (173//93) HR: 66 RR: 18 SpO2: 98%  Labs significant for: H/H 9.9/32.6, D-dimer 486, BUN/Cr 27/1.60, trop 0.068, proBNP 1995  CXR: no acute infiltrate on personal read  CT Angio Chest with IV contrast: Patent central airways. Dependent and basilar atelectasis. Otherwise clear lungs. Negative for pulmonary embolism.  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 **CHARTING IN PROGRESS**     In the ED,  Vital Signs T(F) Max: 99.1 BP initially 186/104 (173//93) HR: 66 RR: 18 SpO2: 98%  Labs significant for: H/H 9.9/32.6, D-dimer 486, BUN/Cr 27/1.60, trop 0.068, proBNP 1995  CXR: no acute infiltrate on personal read  CT Angio Chest with IV contrast: Patent central airways. Dependent and basilar atelectasis. Otherwise clear lungs. Negative for pulmonary embolism.  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 SOB began 2 days while patient was lying in bed. States that he felt like he was having difficulty catching his breath and has been constant since onset. Has no aggregating or alleviating factors. Has been having associated sharp jabbing pain in the lower rib areas. Pain is mild, non-radiating, gradual in onset, has no aggravating/alleviating factors, and resolves spontaneously in 30 min. Had one episode of diffuse, cramping abdominal pain yesterday evening and nausea that resolved spontaneously. Of note, pt has history of HTN, does not follow with cardiologist. States that he checks his BP daily and for the past two months it has been severely elevated with SBP in the 200s. Denies having any fever, chills, cough, cold-like symptoms, headache, visual changes, chest pain, diarrhea, or dysuria. Has no sick contacts.     In the ED,  Vital Signs T(F) Max: 99.1 BP initially 186/104 (173//93) HR: 66 RR: 18 SpO2: 98%  Labs significant for: H/H 9.9/32.6, D-dimer 486, BUN/Cr 27/1.60, trop 0.068, proBNP 1995  CXR: no acute infiltrate on personal read  CT Angio Chest with IV contrast: Patent central airways. Dependent and basilar atelectasis. Otherwise clear lungs. Negative for pulmonary embolism.  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 SOB began 2 days while patient was lying in bed. States that he felt like he was having difficulty catching his breath and has been constant since onset. SOB not worse with exertion and has no other aggravating or alleviating factors. Has been having associated sharp jabbing pain in the lower rib areas. Pain is mild, non-radiating, gradual in onset, has no aggravating/alleviating factors, and resolves spontaneously in 30 min. Had one episode of diffuse, cramping abdominal pain yesterday evening and nausea that resolved spontaneously. Also, endorses calf soreness but states that it is not really calf pain. Of note, pt has history of HTN, does not follow with cardiologist. States that he checks his BP daily and for the past two months it has been severely elevated with SBP in the 200s. Denies having any fever, chills, cough, cold-like symptoms, headache, visual changes, chest pain, diarrhea, or dysuria. Has no sick contacts.     In the ED,  Vital Signs T(F) Max: 99.1 BP initially 186/104 (173//93) HR: 66 RR: 18 SpO2: 98%  Labs significant for: H/H 9.9/32.6, D-dimer 486, BUN/Cr 27/1.60, trop 0.068, proBNP 1995  CXR: no acute infiltrate on personal read  CT Angio Chest with IV contrast: Patent central airways. Dependent and basilar atelectasis. Otherwise clear lungs. Negative for pulmonary embolism.  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 SOB began 2 days while patient was lying in bed. States that he felt like he was having difficulty catching his breath and has been constant since onset. SOB not worse with exertion and has no other aggravating or alleviating factors. Has been having associated sharp jabbing pain in the lower rib areas. Pain is mild, non-radiating, gradual in onset, has no aggravating/alleviating factors, and resolves spontaneously in 30 min. Had one episode of diffuse, cramping abdominal pain yesterday evening and nausea that resolved spontaneously. Also, endorses calf soreness but states that it is not really calf pain. Of note, pt has history of HTN, does not follow with cardiologist. States that he checks his BP daily and for the past two months it has been severely elevated with SBP in the 200s. Denies having any fever, chills, cough, cold-like symptoms, headache, visual changes, chest pain, diarrhea, or dysuria. Has no sick contacts.     In the ED,  Vital Signs T(F) Max: 99.1 BP initially 186/104 (173//93) HR: 66 RR: 18 SpO2: 98%  Labs significant for: H/H 9.9/32.6, D-dimer 486, BUN/Cr 27/1.60, trop 0.068, proBNP 1995  CXR: no acute infiltrate on personal read  CT Angio Chest with IV contrast: Patent central airways. Dependent and basilar atelectasis. Otherwise clear lungs. Negative for pulmonary embolism.  EKG: SR with 1st degree AV block at 73bpm, nonspecific twave abn

## 2021-07-19 NOTE — H&P ADULT - PROBLEM SELECTOR PLAN 1
admit to telemetry  patient's BP on arrival was 186/104, s/p IV hydralazine  will continue labetalol, hydralazine and amlodipine  his symptoms of dyspnea are likely related to uncontrolled HTN  to check 2D ECHO and cycle cardiac enzymes  cardio Dr. Monterroso following, recs appreciated admit to telemetry  patient's BP on arrival was 186/104, s/p IV hydralazine  will continue labetalol, hydralazine, hydrochlorthiazide, and amlodipine  his symptoms of dyspnea are likely related to uncontrolled HTN  to check 2D ECHO and cycle cardiac enzymes  cardio Dr. Monterroso following, recs appreciated

## 2021-07-19 NOTE — H&P ADULT - PROBLEM SELECTOR PLAN 2
suspect likely demand in setting or prolonged HTN  will trend CE's to peak and follow up echocardiogram suspect likely demand in setting or prolonged HTN  will trend CE's to peak and follow up echocardiogram  start aspirin and statin

## 2021-07-19 NOTE — H&P ADULT - NSHPPHYSICALEXAM_GEN_ALL_CORE
T(C): 37.2 (07-19-21 @ 21:46), Max: 37.3 (07-19-21 @ 18:28)  HR: 66 (07-19-21 @ 21:46) (66 - 83)  BP: 173/84 (07-19-21 @ 21:46) (173/84 - 194/93)  RR: 18 (07-19-21 @ 21:46) (16 - 18)  SpO2: 98% (07-19-21 @ 21:46) (97% - 98%)    General: No apparent distress  Head: normocephalic, atraumatic  Eyes: EOMI, anicteric  ENT: moist mucous membranes, no pharyngeal exudates  Heart: RRR, S1, S2, +systolic murmur  Chest: CTA b/l, no rales, rhonchi, or wheezes  Abd: BS+, soft, NT, ND  Back: no CVA tenderness  Extr: no cyanosis; trace edema  Neuro: AA&Ox3, no focal weakness, sensation to light touch intact  Psych: normal affect T(C): 37.2 (07-19-21 @ 21:46), Max: 37.3 (07-19-21 @ 18:28)  HR: 66 (07-19-21 @ 21:46) (66 - 83)  BP: 173/84 (07-19-21 @ 21:46) (173/84 - 194/93)  RR: 18 (07-19-21 @ 21:46) (16 - 18)  SpO2: 98% (07-19-21 @ 21:46) (97% - 98%)    General: No apparent distress  Head: normocephalic, atraumatic  Eyes: EOMI, anicteric  ENT: moist mucous membranes, no pharyngeal exudates  Heart: RRR, S1, S2, +systolic murmur  Chest: CTA b/l, no rales, rhonchi, or wheezes  Abd: BS+, soft, NT, ND  Back: no CVA tenderness  Extr: no cyanosis; trace edema  Integument: warm, well perfused; +tattoos  Neuro: AA&Ox3, no focal weakness, sensation to light touch intact  Psych: normal affect

## 2021-07-19 NOTE — ED PROVIDER NOTE - NS ED ROS FT
Constitutional: No fever.  Neurological: No headache.  Eyes: No vision changes.   Ears, Nose, Mouth, Throat: No congestion.  Cardiovascular: No chest pain.  Respiratory: +difficulty breathing.  Gastrointestinal: No nausea or vomiting. + abdominal pain.  Genitourinary: No dysuria.  Musculoskeletal: No joint pain.  Integumentary (skin and/or breast): No rash.

## 2021-07-19 NOTE — ED PROVIDER NOTE - OBJECTIVE STATEMENT
76 m history of HTN, HLD, CAD (with stents), diverticulosis, anxiety, depression, Hep C, lupus, DM, and blood clots in brain (s/p surgery 2013) here complaining of several months of HTN and several days of shortness of breath. shortness of breath non-exertional, no chest pain but has bilateral upper abdominal pain, also x months. no associated nausea, vomiting, constipation, diarrhea. patient took his BP meds this morning. PMD is Dr. Man.

## 2021-07-19 NOTE — PATIENT PROFILE ADULT - HOW PATIENT ADDRESSED, PROFILE
Arrived to the [facility]. Labs and 1 liter of NS completed. Patient tolerated without adverse reaction. Any issues or concerns during appointment: none. Discharged to home.
[employer]

## 2021-07-19 NOTE — H&P ADULT - ATTENDING COMMENTS
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    admit to telemetry  patient's BP on arrival was 186/104, s/p IV hydralazine  will continue home labetalol, hydralazine and amlodipine  his symptoms of dyspnea are likely related to uncontrolled HTN  to check 2D ECHO and cycle cardiac enzymes  start aspirin and statin therapy  cardio Dr. Monterroso following, recs appreciated  med rec was confirmed with group home - patient is unreliable about his medications    Agree with H&P as outlined above, edited where appropriate.

## 2021-07-19 NOTE — H&P ADULT - NSICDXPASTMEDICALHX_GEN_ALL_CORE_FT
PAST MEDICAL HISTORY:  Anxiety and depression     CAD (coronary artery disease) s/p stents    Diabetes mellitus     Diverticulosis     Hepatitis C     Hyperlipidemia     Hypertension     Lupus

## 2021-07-19 NOTE — H&P ADULT - PROBLEM SELECTOR PLAN 8
continue home cardura  on oxybutnin at home continue home cardura  on oxybutnin 15mg ER at home - will c/w oxybutnin 5mg TID

## 2021-07-19 NOTE — ED PROVIDER NOTE - PATIENT PORTAL LINK FT
You can access the FollowMyHealth Patient Portal offered by [facility] by registering at the following website: [email or web address]. By joining [facility]’s FollowMyHealth portal, you will also be able to view your health information using other applications (apps) compatible with our system.

## 2021-07-19 NOTE — H&P ADULT - NSHPLABSRESULTS_GEN_ALL_CORE
Labs reviewed (see below).    Imaging:  < from: CT Angio Chest PE Protocol w/ IV Cont (07.19.21 @ 15:31) >    EXAM:  CT ANGIO CHEST PULM WANDA TIRADO                          PROCEDURE DATE:  07/19/2021      INTERPRETATION:  CLINICAL INFORMATION: Hypertension and shortness of breath    COMPARISON: None.    CONTRAST/COMPLICATIONS:  IV Contrast: [id number]  90 cc administered   10 cc discarded  Oral Contrast: NONE  Complications: None reported at time of study completion    PROCEDURE:  CT Angiography of the Chest.  Sagittal and coronal reformats were performed as well as 3D (MIP) reconstructions.    FINDINGS:    LUNGS AND AIRWAYS: Patent central airways.  Dependent and basilar atelectasis. Otherwise clear lungs.  PLEURA: No pleural effusion.  MEDIASTINUM AND SANDIP: No lymphadenopathy.  VESSELS: Atherosclerotic calcifications of thoracic aorta and coronary arteries. Multiple images are degraded by respiratory motion. Excluding regions with motion artifact, there are no pulmonary arterial filling defects identified.  HEART: Cardiomegaly. Aortic valve calcifications. No pericardial effusion. Right pericardial cyst.  CHEST WALL AND LOWER NECK: Within normal limits.  VISUALIZED UPPER ABDOMEN: Probable right renal cysts.  BONES: Degenerative changes. Chronic left rib deformities.    IMPRESSION:  Negative for pulmonary embolism.    --- End of Report ---    RAINA DAO MD; Attending Radiologist  This document has been electronically signed. Jul 19 2021  3:47PM    < end of copied text >  CTA chest reviewed personally.    CXR with no acute infiltrates on personal review.     Cardiology:  < from: 12 Lead ECG (07.19.21 @ 14:18) >    Ventricular Rate 73 BPM    Atrial Rate 73 BPM    P-R Interval 256 ms    QRS Duration 114 ms    Q-T Interval 428 ms    QTC Calculation(Bazett) 471 ms    R Axis -14 degrees    T Axis 33 degrees    Diagnosis Line Sinus rhythm with 1st degree AV block  Nonspecific T wave abnormality  Abnormal ECG    Confirmed by lynne Monterroso ([id number]) on 7/19/2021 4:09:03 PM    < end of copied text >    EKG personally reviewed.

## 2021-07-19 NOTE — H&P ADULT - PROBLEM SELECTOR PLAN 6
Type 2 DM not on longterm insulin  hold home metformin while inpatient  continue low dose insulin corrective scale  monitor blood glucose, hypoglycemia protocol  check A1C in AM

## 2021-07-19 NOTE — H&P ADULT - NSHPREVIEWOFSYSTEMS_GEN_ALL_CORE
General: no fever, chills  Eyes: no vision changes  ENT: no hearing changes, nasal congestion, or sore throat  CV: no chest pain or palpitations  Pulm: no wheezing, cough, or hemoptysis; ADMITS to shortness of breath  Abd/GI: no nausea, vomiting, diarrhea, constipation, abd pain  : no dysuria, hematuria, urinary frequency  MSK: no joint pain or myalgias  Neuro: no syncope, dizziness, focal weakness  Psych: no anxiety or depression  Endo: no heat or cold intolerance General: no fever, chills  Eyes: no vision changes  ENT: no hearing changes, nasal congestion, or sore throat  CV: no chest pain or palpitations  Pulm: no wheezing, cough, or hemoptysis; ADMITS to shortness of breath  Abd/GI: no nausea, vomiting, diarrhea, constipation, abd pain  : no dysuria, hematuria, urinary frequency  MSK: no joint pain or myalgias; admits to some cramping in his lower extremities  Neuro: no syncope, dizziness, focal weakness  Psych: no anxiety or depression  Endo: no heat or cold intolerance

## 2021-07-19 NOTE — H&P ADULT - PROBLEM SELECTOR PLAN 3
s/p PCI with stents  continue plavix and statin  on vascepa and fenofibrate at home for HLD s/p PCI with stents  was taken off of Plavix and statin  will continue with home fenofibrate. Pt is also on vascepa at home but this is non-formulary. s/p PCI with stents  was taken off of Plavix and statin  will continue with home fenofibrate. Pt is also on vascepa at home but this is non-formulary.  will START aspirin 81mg daily and atorvastatin 40mg daily

## 2021-07-19 NOTE — ED PROVIDER NOTE - PROGRESS NOTE DETAILS
[FreeTextEntry1] : pt comes for surgery . She has been using a pessary that was placed over a year ago but it hurts and has constant discharge . it hurts when she tries to put it in or removal not when it is in . She has no incontinence or constipation . She had a hysterectomy in the past for benign cause .  All results were explained to patient and/or family and a copy of all available results given.  [facility]. All results were explained to patient and/or family and a copy of all available results given. case dw Dr. Leo

## 2021-07-20 ENCOUNTER — TRANSCRIPTION ENCOUNTER (OUTPATIENT)
Age: 76
End: 2021-07-20

## 2021-07-20 LAB
A1C WITH ESTIMATED AVERAGE GLUCOSE RESULT: 6.1 % — HIGH (ref 4–5.6)
ALBUMIN SERPL ELPH-MCNC: 3.2 G/DL — LOW (ref 3.3–5)
ALP SERPL-CCNC: 31 U/L — LOW (ref 40–120)
ALT FLD-CCNC: 24 U/L — SIGNIFICANT CHANGE UP (ref 12–78)
ANION GAP SERPL CALC-SCNC: 7 MMOL/L — SIGNIFICANT CHANGE UP (ref 5–17)
AST SERPL-CCNC: 19 U/L — SIGNIFICANT CHANGE UP (ref 15–37)
BASOPHILS # BLD AUTO: 0.04 K/UL — SIGNIFICANT CHANGE UP (ref 0–0.2)
BASOPHILS NFR BLD AUTO: 0.5 % — SIGNIFICANT CHANGE UP (ref 0–2)
BILIRUB SERPL-MCNC: 0.3 MG/DL — SIGNIFICANT CHANGE UP (ref 0.2–1.2)
BUN SERPL-MCNC: 29 MG/DL — HIGH (ref 7–23)
CALCIUM SERPL-MCNC: 8.6 MG/DL — SIGNIFICANT CHANGE UP (ref 8.5–10.1)
CHLORIDE SERPL-SCNC: 110 MMOL/L — HIGH (ref 96–108)
CK MB BLD-MCNC: 2.4 % — SIGNIFICANT CHANGE UP (ref 0–3.5)
CK MB BLD-MCNC: 2.6 % — SIGNIFICANT CHANGE UP (ref 0–3.5)
CK MB CFR SERPL CALC: 2.5 NG/ML — SIGNIFICANT CHANGE UP (ref 0–3.6)
CK MB CFR SERPL CALC: 2.6 NG/ML — SIGNIFICANT CHANGE UP (ref 0–3.6)
CK SERPL-CCNC: 101 U/L — SIGNIFICANT CHANGE UP (ref 26–308)
CK SERPL-CCNC: 105 U/L — SIGNIFICANT CHANGE UP (ref 26–308)
CO2 SERPL-SCNC: 26 MMOL/L — SIGNIFICANT CHANGE UP (ref 22–31)
COVID-19 SPIKE DOMAIN AB INTERP: POSITIVE
COVID-19 SPIKE DOMAIN ANTIBODY RESULT: >250 U/ML — HIGH
CREAT SERPL-MCNC: 1.6 MG/DL — HIGH (ref 0.5–1.3)
EOSINOPHIL # BLD AUTO: 0.25 K/UL — SIGNIFICANT CHANGE UP (ref 0–0.5)
EOSINOPHIL NFR BLD AUTO: 3 % — SIGNIFICANT CHANGE UP (ref 0–6)
ESTIMATED AVERAGE GLUCOSE: 128 MG/DL — HIGH (ref 68–114)
FERRITIN SERPL-MCNC: 11 NG/ML — LOW (ref 30–400)
FOLATE SERPL-MCNC: 5.9 NG/ML — SIGNIFICANT CHANGE UP
GLUCOSE SERPL-MCNC: 116 MG/DL — HIGH (ref 70–99)
HCT VFR BLD CALC: 31.3 % — LOW (ref 39–50)
HGB BLD-MCNC: 9.5 G/DL — LOW (ref 13–17)
IMM GRANULOCYTES NFR BLD AUTO: 0.2 % — SIGNIFICANT CHANGE UP (ref 0–1.5)
IRON SATN MFR SERPL: 32 UG/DL — LOW (ref 45–165)
IRON SATN MFR SERPL: 9 % — LOW (ref 16–55)
LYMPHOCYTES # BLD AUTO: 1.91 K/UL — SIGNIFICANT CHANGE UP (ref 1–3.3)
LYMPHOCYTES # BLD AUTO: 22.8 % — SIGNIFICANT CHANGE UP (ref 13–44)
MAGNESIUM SERPL-MCNC: 2.3 MG/DL — SIGNIFICANT CHANGE UP (ref 1.6–2.6)
MCHC RBC-ENTMCNC: 24.9 PG — LOW (ref 27–34)
MCHC RBC-ENTMCNC: 30.4 GM/DL — LOW (ref 32–36)
MCV RBC AUTO: 81.9 FL — SIGNIFICANT CHANGE UP (ref 80–100)
MONOCYTES # BLD AUTO: 0.49 K/UL — SIGNIFICANT CHANGE UP (ref 0–0.9)
MONOCYTES NFR BLD AUTO: 5.9 % — SIGNIFICANT CHANGE UP (ref 2–14)
NEUTROPHILS # BLD AUTO: 5.66 K/UL — SIGNIFICANT CHANGE UP (ref 1.8–7.4)
NEUTROPHILS NFR BLD AUTO: 67.6 % — SIGNIFICANT CHANGE UP (ref 43–77)
NRBC # BLD: 0 /100 WBCS — SIGNIFICANT CHANGE UP (ref 0–0)
PHOSPHATE SERPL-MCNC: 3.5 MG/DL — SIGNIFICANT CHANGE UP (ref 2.5–4.5)
PLATELET # BLD AUTO: 266 K/UL — SIGNIFICANT CHANGE UP (ref 150–400)
POTASSIUM SERPL-MCNC: 3.6 MMOL/L — SIGNIFICANT CHANGE UP (ref 3.5–5.3)
POTASSIUM SERPL-SCNC: 3.6 MMOL/L — SIGNIFICANT CHANGE UP (ref 3.5–5.3)
PROT SERPL-MCNC: 6.4 G/DL — SIGNIFICANT CHANGE UP (ref 6–8.3)
RBC # BLD: 3.82 M/UL — LOW (ref 4.2–5.8)
RBC # FLD: 17.4 % — HIGH (ref 10.3–14.5)
SARS-COV-2 IGG+IGM SERPL QL IA: >250 U/ML — HIGH
SARS-COV-2 IGG+IGM SERPL QL IA: POSITIVE
SODIUM SERPL-SCNC: 143 MMOL/L — SIGNIFICANT CHANGE UP (ref 135–145)
TIBC SERPL-MCNC: 350 UG/DL — SIGNIFICANT CHANGE UP (ref 220–430)
TROPONIN I SERPL-MCNC: 0.07 NG/ML — HIGH (ref 0.01–0.04)
TROPONIN I SERPL-MCNC: 0.1 NG/ML — HIGH (ref 0.01–0.04)
UIBC SERPL-MCNC: 319 UG/DL — SIGNIFICANT CHANGE UP (ref 110–370)
VIT B12 SERPL-MCNC: 345 PG/ML — SIGNIFICANT CHANGE UP (ref 232–1245)
WBC # BLD: 8.37 K/UL — SIGNIFICANT CHANGE UP (ref 3.8–10.5)
WBC # FLD AUTO: 8.37 K/UL — SIGNIFICANT CHANGE UP (ref 3.8–10.5)

## 2021-07-20 PROCEDURE — 93306 TTE W/DOPPLER COMPLETE: CPT | Mod: 26

## 2021-07-20 PROCEDURE — 99232 SBSQ HOSP IP/OBS MODERATE 35: CPT

## 2021-07-20 PROCEDURE — 93970 EXTREMITY STUDY: CPT | Mod: 26

## 2021-07-20 PROCEDURE — 99233 SBSQ HOSP IP/OBS HIGH 50: CPT | Mod: 25,GC

## 2021-07-20 RX ORDER — LABETALOL HCL 100 MG
100 TABLET ORAL
Refills: 0 | Status: DISCONTINUED | OUTPATIENT
Start: 2021-07-20 | End: 2021-07-21

## 2021-07-20 RX ORDER — ARIPIPRAZOLE 15 MG/1
30 TABLET ORAL DAILY
Refills: 0 | Status: DISCONTINUED | OUTPATIENT
Start: 2021-07-20 | End: 2021-07-27

## 2021-07-20 RX ORDER — LAMOTRIGINE 25 MG/1
100 TABLET, ORALLY DISINTEGRATING ORAL DAILY
Refills: 0 | Status: DISCONTINUED | OUTPATIENT
Start: 2021-07-20 | End: 2021-07-27

## 2021-07-20 RX ORDER — ICOSAPENT ETHYL 500 MG/1
0 CAPSULE, LIQUID FILLED ORAL
Qty: 0 | Refills: 5 | DISCHARGE

## 2021-07-20 RX ORDER — PANTOPRAZOLE SODIUM 20 MG/1
40 TABLET, DELAYED RELEASE ORAL
Refills: 0 | Status: DISCONTINUED | OUTPATIENT
Start: 2021-07-20 | End: 2021-07-27

## 2021-07-20 RX ORDER — VENLAFAXINE HCL 75 MG
150 CAPSULE, EXT RELEASE 24 HR ORAL DAILY
Refills: 0 | Status: DISCONTINUED | OUTPATIENT
Start: 2021-07-20 | End: 2021-07-27

## 2021-07-20 RX ORDER — LABETALOL HCL 100 MG
100 TABLET ORAL DAILY
Refills: 0 | Status: DISCONTINUED | OUTPATIENT
Start: 2021-07-20 | End: 2021-07-20

## 2021-07-20 RX ORDER — METFORMIN HYDROCHLORIDE 850 MG/1
0 TABLET ORAL
Qty: 0 | Refills: 11 | DISCHARGE

## 2021-07-20 RX ORDER — MIRTAZAPINE 45 MG/1
30 TABLET, ORALLY DISINTEGRATING ORAL AT BEDTIME
Refills: 0 | Status: DISCONTINUED | OUTPATIENT
Start: 2021-07-20 | End: 2021-07-27

## 2021-07-20 RX ORDER — OXYBUTYNIN CHLORIDE 5 MG
5 TABLET ORAL THREE TIMES A DAY
Refills: 0 | Status: DISCONTINUED | OUTPATIENT
Start: 2021-07-20 | End: 2021-07-27

## 2021-07-20 RX ORDER — DOXEPIN HCL 100 MG
25 CAPSULE ORAL AT BEDTIME
Refills: 0 | Status: DISCONTINUED | OUTPATIENT
Start: 2021-07-20 | End: 2021-07-27

## 2021-07-20 RX ORDER — ATORVASTATIN CALCIUM 80 MG/1
40 TABLET, FILM COATED ORAL AT BEDTIME
Refills: 0 | Status: DISCONTINUED | OUTPATIENT
Start: 2021-07-20 | End: 2021-07-27

## 2021-07-20 RX ORDER — ROSUVASTATIN CALCIUM 5 MG/1
0 TABLET ORAL
Qty: 0 | Refills: 5 | DISCHARGE

## 2021-07-20 RX ORDER — FENOFIBRATE,MICRONIZED 130 MG
145 CAPSULE ORAL DAILY
Refills: 0 | Status: DISCONTINUED | OUTPATIENT
Start: 2021-07-20 | End: 2021-07-27

## 2021-07-20 RX ORDER — ASPIRIN/CALCIUM CARB/MAGNESIUM 324 MG
81 TABLET ORAL DAILY
Refills: 0 | Status: DISCONTINUED | OUTPATIENT
Start: 2021-07-20 | End: 2021-07-27

## 2021-07-20 RX ORDER — AMLODIPINE BESYLATE 2.5 MG/1
10 TABLET ORAL DAILY
Refills: 0 | Status: DISCONTINUED | OUTPATIENT
Start: 2021-07-20 | End: 2021-07-27

## 2021-07-20 RX ORDER — CHOLECALCIFEROL (VITAMIN D3) 125 MCG
5000 CAPSULE ORAL DAILY
Refills: 0 | Status: DISCONTINUED | OUTPATIENT
Start: 2021-07-20 | End: 2021-07-27

## 2021-07-20 RX ORDER — DOXAZOSIN MESYLATE 4 MG
4 TABLET ORAL
Refills: 0 | Status: DISCONTINUED | OUTPATIENT
Start: 2021-07-20 | End: 2021-07-27

## 2021-07-20 RX ORDER — METFORMIN HYDROCHLORIDE 850 MG/1
1 TABLET ORAL
Qty: 0 | Refills: 11 | DISCHARGE

## 2021-07-20 RX ADMIN — ATORVASTATIN CALCIUM 40 MILLIGRAM(S): 80 TABLET, FILM COATED ORAL at 21:22

## 2021-07-20 RX ADMIN — PANTOPRAZOLE SODIUM 40 MILLIGRAM(S): 20 TABLET, DELAYED RELEASE ORAL at 05:44

## 2021-07-20 RX ADMIN — Medication 5 MILLIGRAM(S): at 05:48

## 2021-07-20 RX ADMIN — Medication 4 MILLIGRAM(S): at 01:57

## 2021-07-20 RX ADMIN — Medication 100 MILLIGRAM(S): at 13:58

## 2021-07-20 RX ADMIN — Medication 100 MILLIGRAM(S): at 05:44

## 2021-07-20 RX ADMIN — Medication 100 MILLIGRAM(S): at 21:22

## 2021-07-20 RX ADMIN — ENOXAPARIN SODIUM 40 MILLIGRAM(S): 100 INJECTION SUBCUTANEOUS at 11:32

## 2021-07-20 RX ADMIN — Medication 81 MILLIGRAM(S): at 11:34

## 2021-07-20 RX ADMIN — Medication 5 MILLIGRAM(S): at 13:58

## 2021-07-20 RX ADMIN — MIRTAZAPINE 30 MILLIGRAM(S): 45 TABLET, ORALLY DISINTEGRATING ORAL at 21:22

## 2021-07-20 RX ADMIN — Medication 25 MILLIGRAM(S): at 01:57

## 2021-07-20 RX ADMIN — ARIPIPRAZOLE 30 MILLIGRAM(S): 15 TABLET ORAL at 11:32

## 2021-07-20 RX ADMIN — Medication 100 MILLIGRAM(S): at 19:41

## 2021-07-20 RX ADMIN — Medication 3 MILLIGRAM(S): at 21:22

## 2021-07-20 RX ADMIN — Medication 4 MILLIGRAM(S): at 17:11

## 2021-07-20 RX ADMIN — Medication 5 MILLIGRAM(S): at 21:22

## 2021-07-20 RX ADMIN — Medication 150 MILLIGRAM(S): at 11:32

## 2021-07-20 RX ADMIN — Medication 25 MILLIGRAM(S): at 21:23

## 2021-07-20 RX ADMIN — MIRTAZAPINE 30 MILLIGRAM(S): 45 TABLET, ORALLY DISINTEGRATING ORAL at 01:57

## 2021-07-20 RX ADMIN — LAMOTRIGINE 100 MILLIGRAM(S): 25 TABLET, ORALLY DISINTEGRATING ORAL at 11:48

## 2021-07-20 RX ADMIN — Medication 4 MILLIGRAM(S): at 05:44

## 2021-07-20 RX ADMIN — AMLODIPINE BESYLATE 10 MILLIGRAM(S): 2.5 TABLET ORAL at 04:40

## 2021-07-20 RX ADMIN — Medication 145 MILLIGRAM(S): at 11:32

## 2021-07-20 RX ADMIN — Medication 5000 UNIT(S): at 11:31

## 2021-07-20 NOTE — PROGRESS NOTE ADULT - PROBLEM SELECTOR PLAN 4
with insomnia  continue home lamictal, buspar, effexor, remeron and doxepin -with insomnia  -continue home lamictal, buspar, effexor, remeron and doxepin

## 2021-07-20 NOTE — PROGRESS NOTE ADULT - PROBLEM SELECTOR PLAN 6
Type 2 DM not on longterm insulin  hold home metformin while inpatient  continue low dose insulin corrective scale  monitor blood glucose, hypoglycemia protocol  check A1C in AM -DM Type 2 not on insulin  -hold home metformin  -continue sliding scale  -monitor blood glucose, hypoglycemia protocol  -check A1C in AM

## 2021-07-20 NOTE — PROGRESS NOTE ADULT - PROBLEM SELECTOR PLAN 5
stable, was admitted in the past for GI bleed  no evidence of bleeding at present  monitor H/H, check iron studies, B12 and folate  will continue with lovenox for now -stable, was admitted in the past for GI bleed  -no evidence of bleeding at present  -monitor H/H, iron studies, B12 and folate  -will continue with lovenox for now -stable, was admitted in the past for GI bleed  -no evidence of bleeding at present  -iron studies consitent with iron deficiency anemia - will check FOBT  -ferritin of 11 - will consider venofer  -vit b12 and folat WNL  -monitor H/H  -will continue with lovenox for now

## 2021-07-20 NOTE — CONSULT NOTE ADULT - ASSESSMENT
76 male with a history of HTN, CAD, CHF, HLD, DM. PVD, SLE, Hepatitis C, depression and CKD now with admitted for accelerated HTN and possible ACS. S/P IV Contrast dye last night. Also was on metformin as out patient. S/P IVF.  Renal indices are stable at this time. Will need to monitor daily. He is at moderate risk for any further contrast dye including cardiac cath. Will  follow.

## 2021-07-20 NOTE — CHART NOTE - NSCHARTNOTEFT_GEN_A_CORE
Called by RN for persistently elevated BP ~190s/90s. Patient asymptomatic. Will increase labetalol to 100mg BID per cardio recs. Will follow closely. RN to call with any changes.

## 2021-07-20 NOTE — PROGRESS NOTE ADULT - ASSESSMENT
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 Admitted for Hypertensive urgency

## 2021-07-20 NOTE — DISCHARGE NOTE PROVIDER - NSDCMRMEDTOKEN_GEN_ALL_CORE_FT
amLODIPine 10 mg oral tablet: 1 tab(s) orally once a day  ARIPiprazole 30 mg oral tablet: 1 tab(s) orally once a day  ARISTADA     INJ 1064MG: INJECT 3.9ML INTRAMUSCULARLY AS DIRECTED  DOXAZOSIN 4MG TAB: TAKE 1 TABLET TWICE A DAY (FOR PROSTATE)  DOXEPIN 25MG CAP: TAKE ONE CAPSULE AT BEDTIME  FENOFIBRATE  TAB 145MG: TAKE ONE TABLET DAILY (FOR CHOLESTEROL)  hydrALAZINE 100 mg oral tablet: 1 tab(s) orally 3 times a day  labetalol 100 mg oral tablet: 1 tab(s) orally once a day  LAMOTRIGINE 100MG TA: TAKE ONE TABLET DAILY  meloxicam 7.5 mg oral tablet: 1 tab(s) orally 2 times a day  metFORMIN 500 mg oral tablet: 1 tab(s) orally once a day  MIRTAZAPINE  TAB 30MG: TAKE 1 TABLET AT BEDTIME  OMEPRAZOLE 40MG CAP: TAKE 1 CAPSULE DAILY (FOR STOMACH)  OXYBUTYNIN ER 15MG TAB: TAKE ONE TABLET DAILY  VASCEPA 1GM CAP: 2 cap(s) orally 2 times a day  VENLAFAXINE XR 150MG CAPS: TAKE 1 CAPSULE DAILY  VITAMIN D 1000UNIT (M) TAB: TAKE 5 TABLETS DAILY   amLODIPine 10 mg oral tablet: 1 tab(s) orally once a day  ARIPiprazole 30 mg oral tablet: 1 tab(s) orally once a day  aspirin 81 mg oral delayed release tablet: 1 tab(s) orally once a day  atorvastatin 40 mg oral tablet: 1 tab(s) orally once a day (at bedtime)  cholecalciferol oral tablet: 5000 unit(s) orally once a day  ciprofloxacin 500 mg oral tablet: 1 tab(s) orally every 12 hours  cloNIDine 0.1 mg oral tablet: 1 tab(s) orally 3 times a day  doxazosin 4 mg oral tablet: 1 tab(s) orally   doxepin 25 mg oral capsule: 1 cap(s) orally once a day (at bedtime)  fenofibrate 145 mg oral tablet: 1 tab(s) orally once a day  hydrALAZINE 100 mg oral tablet: 1 tab(s) orally 3 times a day  labetalol 300 mg oral tablet: 1 tab(s) orally 3 times a day  lamoTRIgine 100 mg oral tablet: 1 tab(s) orally once a day  meloxicam 7.5 mg oral tablet: 1 tab(s) orally 2 times a day  metFORMIN 500 mg oral tablet: 1 tab(s) orally once a day  metroNIDAZOLE 500 mg oral tablet: 1 tab(s) orally every 8 hours  mirtazapine 30 mg oral tablet: 1 tab(s) orally once a day (at bedtime)  OMEPRAZOLE 40MG CAP: TAKE 1 CAPSULE DAILY (FOR STOMACH)  oxybutynin 5 mg oral tablet: 1 tab(s) orally 3 times a day  VASCEPA 1GM CAP: 2 cap(s) orally 2 times a day  venlafaxine 150 mg oral capsule, extended release: 1 cap(s) orally once a day   amLODIPine 10 mg oral tablet: 1 tab(s) orally once a day  ARIPiprazole 30 mg oral tablet: 1 tab(s) orally once a day  aspirin 81 mg oral delayed release tablet: 1 tab(s) orally once a day  cholecalciferol oral tablet: 5000 unit(s) orally once a day  ciprofloxacin 500 mg oral tablet: 1 tab(s) orally every 12 hours  cloNIDine 0.1 mg oral tablet: 1 tab(s) orally 3 times a day  doxazosin 4 mg oral tablet: 1 tab(s) orally   doxepin 25 mg oral capsule: 1 cap(s) orally once a day (at bedtime)  fenofibrate 145 mg oral tablet: 1 tab(s) orally once a day  hydrALAZINE 100 mg oral tablet: 1 tab(s) orally 3 times a day  labetalol 300 mg oral tablet: 1 tab(s) orally 3 times a day  lamoTRIgine 100 mg oral tablet: 1 tab(s) orally once a day  metFORMIN 500 mg oral tablet: 1 tab(s) orally once a day  metroNIDAZOLE 500 mg oral tablet: 1 tab(s) orally every 8 hours  mirtazapine 30 mg oral tablet: 1 tab(s) orally once a day (at bedtime)  OMEPRAZOLE 40MG CAP: TAKE 1 CAPSULE DAILY (FOR STOMACH)  oxybutynin 15 mg/24 hr oral tablet, extended release: 1 tab(s) orally once a day  VASCEPA 1GM CAP: 2 cap(s) orally 2 times a day  venlafaxine 150 mg oral capsule, extended release: 1 cap(s) orally once a day   amLODIPine 10 mg oral tablet: 1 tab(s) orally once a day  ARIPiprazole 30 mg oral tablet: 1 tab(s) orally once a day  aspirin 81 mg oral delayed release tablet: 1 tab(s) orally once a day  cholecalciferol oral tablet: 5000 unit(s) orally once a day  ciprofloxacin 500 mg oral tablet: 1 tab(s) orally every 12 hours  cloNIDine 0.1 mg oral tablet: 1 tab(s) orally 3 times a day  doxazosin 4 mg oral tablet: 1 tab(s) orally   doxepin 25 mg oral capsule: 1 cap(s) orally once a day (at bedtime)  fenofibrate 145 mg oral tablet: 1 tab(s) orally once a day  hydrALAZINE 100 mg oral tablet: 1 tab(s) orally 3 times a day  labetalol 300 mg oral tablet: 1 tab(s) orally 3 times a day  lactobacillus acidophilus oral capsule: 1 cap(s) orally 2 times a day   lamoTRIgine 100 mg oral tablet: 1 tab(s) orally once a day  metFORMIN 500 mg oral tablet: 1 tab(s) orally once a day  metroNIDAZOLE 500 mg oral tablet: 1 tab(s) orally every 8 hours  mirtazapine 30 mg oral tablet: 1 tab(s) orally once a day (at bedtime)  OMEPRAZOLE 40MG CAP: TAKE 1 CAPSULE DAILY (FOR STOMACH)  oxybutynin 15 mg/24 hr oral tablet, extended release: 1 tab(s) orally once a day  VASCEPA 1GM CAP: 2 cap(s) orally 2 times a day  venlafaxine 150 mg oral capsule, extended release: 1 cap(s) orally once a day

## 2021-07-20 NOTE — CONSULT NOTE ADULT - SUBJECTIVE AND OBJECTIVE BOX
Nephrology Consultation: Ermias Hwang MD     TRIPP ZARATE  76y    HPI:  77 y/o M with PMHx of Type 2 DM (not on insulin), CAD s/p stents >4 years ago (off plavix), Lupus, HTN, HLD, CKD 3, HepC, diverticulosis, 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 SOB began 2 days while patient was lying in bed. States that he felt like he was having difficulty catching his breath and has been constant since onset. SOB not worse with exertion and has no other aggravating or alleviating factors. Has been having associated sharp jabbing pain in the lower rib areas. Pain is mild, non-radiating, gradual in onset, has no aggravating/alleviating factors, and resolves spontaneously in 30 min. Had one episode of diffuse, cramping abdominal pain yesterday evening and nausea that resolved spontaneously. Also, endorses calf soreness but states that it is not really calf pain. Of note, pt has history of HTN, does not follow with cardiologist. States that he checks his BP daily and for the past two months it has been severely elevated with SBP in the 200s. Denies having any fever, chills, cough, cold-like symptoms, headache, visual changes, chest pain, diarrhea, or dysuria. Has no sick contacts.       patient has CKD and was evaluated by us in the past.  scheduled to see Cayden Levy  now S/P IV contrast with CT angio.   Denies any use of NSAIDS.   Patient has been on metformin.      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  Had surgery ( April 2013 )    S/P tonsillectomy    S/P arthroscopic knee surgery  Bilateral ( 2005 )    Torsion of testicle  Had surgery at age 14    Pilonidal cyst  Had surgery ( 1969 )    S/P cataract surgery  Bilateral    H/O hernia repair        Allergies    No Known Allergies    Intolerances        Home Medications:  amLODIPine 10 mg oral tablet: 1 tab(s) orally once a day (20 Jul 2021 00:24)  ARIPiprazole 30 mg oral tablet: 1 tab(s) orally once a day (20 Jul 2021 00:24)  ARISTADA     INJ 1064MG: INJECT 3.9ML INTRAMUSCULARLY AS DIRECTED (20 Jul 2021 00:24)  DOXAZOSIN 4MG TAB: TAKE 1 TABLET TWICE A DAY (FOR PROSTATE) (20 Jul 2021 00:24)  DOXEPIN 25MG CAP: TAKE ONE CAPSULE AT BEDTIME (20 Jul 2021 00:24)  FENOFIBRATE  TAB 145MG: TAKE ONE TABLET DAILY (FOR CHOLESTEROL) (20 Jul 2021 00:24)  hydrALAZINE 100 mg oral tablet: 1 tab(s) orally 3 times a day (20 Jul 2021 00:24)  labetalol 100 mg oral tablet: 1 tab(s) orally once a day (20 Jul 2021 00:24)  LAMOTRIGINE 100MG TA: TAKE ONE TABLET DAILY (20 Jul 2021 00:24)  meloxicam 7.5 mg oral tablet: 1 tab(s) orally 2 times a day (20 Jul 2021 00:24)  metFORMIN 500 mg oral tablet: 1 tab(s) orally once a day (20 Jul 2021 00:24)  MIRTAZAPINE  TAB 30MG: TAKE 1 TABLET AT BEDTIME (20 Jul 2021 00:24)  OMEPRAZOLE 40MG CAP: TAKE 1 CAPSULE DAILY (FOR STOMACH) (20 Jul 2021 00:24)  OXYBUTYNIN ER 15MG TAB: TAKE ONE TABLET DAILY (20 Jul 2021 00:24)  VASCEPA 1GM CAP: 2 cap(s) orally 2 times a day (20 Jul 2021 00:24)  VENLAFAXINE XR 150MG CAPS: TAKE 1 CAPSULE DAILY (20 Jul 2021 00:24)  VITAMIN D 1000UNIT (M) TAB: TAKE 5 TABLETS DAILY (20 Jul 2021 00:24)        FAMILY HISTORY:  FHx: throat cancer    No family history of colorectal cancer        SOCIAL HISTORY:    REVIEW OF SYSTEMS:    Constitutional: No fever, weight loss or fatigue  Eyes: No eye pain, visual disturbances, or discharge  ENT:  No difficulty hearing, tinnitus, vertigo; No sinus or throat pain  Neck: No pain or stiffness  Breasts: No pain, masses or nipple discharge  Respiratory: No cough, wheezing, chills or hemoptysis  Cardiovascular: No chest pain, palpitations, shortness of breath, dizziness or leg swelling  Gastrointestinal: No abdominal or epigastric pain. No nausea, vomiting or hematemesis; No diarrhea or constipation. No melena or hematochezia.  Genitourinary: No dysuria, frequency, hematuria or incontinence  Rectal: No pain, hemorrhoids or incontinence  Neurological: No headaches, memory loss, loss of strength, numbness or tremors  Skin: No itching, burning, rashes or lesions   Lymph Nodes: No enlarged glands  Endocrine: No heat or cold intolerance; No hair loss  Musculoskeletal: No joint pain or swelling; No muscle, back or extremity pain  Psychiatric: No depression, anxiety, mood swings or difficulty sleeping  Heme/Lymph: No easy bruising or bleeding gums  Allergy and Immunologic: No hives or eczema    current medications:    acetaminophen   Tablet .. 650 milliGRAM(s) Oral every 6 hours PRN  amLODIPine   Tablet 10 milliGRAM(s) Oral daily  ARIPiprazole 30 milliGRAM(s) Oral daily  aspirin enteric coated 81 milliGRAM(s) Oral daily  atorvastatin 40 milliGRAM(s) Oral at bedtime  cholecalciferol 5000 Unit(s) Oral daily  dextrose 40% Gel 15 Gram(s) Oral once  dextrose 5%. 1000 milliLiter(s) IV Continuous <Continuous>  dextrose 5%. 1000 milliLiter(s) IV Continuous <Continuous>  dextrose 50% Injectable 25 Gram(s) IV Push once  dextrose 50% Injectable 12.5 Gram(s) IV Push once  dextrose 50% Injectable 25 Gram(s) IV Push once  doxazosin 4 milliGRAM(s) Oral <User Schedule>  doxepin 25 milliGRAM(s) Oral at bedtime  enoxaparin Injectable 40 milliGRAM(s) SubCutaneous daily  fenofibrate Tablet 145 milliGRAM(s) Oral daily  glucagon  Injectable 1 milliGRAM(s) IntraMuscular once  hydrALAZINE 100 milliGRAM(s) Oral three times a day  insulin lispro (ADMELOG) corrective regimen sliding scale   SubCutaneous three times a day before meals  insulin lispro (ADMELOG) corrective regimen sliding scale   SubCutaneous at bedtime  labetalol 100 milliGRAM(s) Oral daily  lamoTRIgine 100 milliGRAM(s) Oral daily  melatonin 3 milliGRAM(s) Oral at bedtime PRN  mirtazapine 30 milliGRAM(s) Oral at bedtime  oxybutynin 5 milliGRAM(s) Oral three times a day  pantoprazole    Tablet 40 milliGRAM(s) Oral before breakfast  venlafaxine XR. 150 milliGRAM(s) Oral daily      Vital Signs Last 24 Hrs  T(C): 36.5 (20 Jul 2021 09:20), Max: 37.3 (19 Jul 2021 18:28)  T(F): 97.7 (20 Jul 2021 09:20), Max: 99.1 (19 Jul 2021 18:28)  HR: 71 (20 Jul 2021 09:20) (64 - 83)  BP: 175/78 (20 Jul 2021 13:57) (165/85 - 223/106)  BP(mean): --  RR: 18 (20 Jul 2021 09:20) (16 - 18)  SpO2: 93% (20 Jul 2021 09:20) (93% - 98%)    PHYSICAL EXAM:    Constitutional: NAD, well-groomed, well-developed  HEENT: PERRLA, EOMI, Normal Hearing, MMM  Neck: No LAD, No JVD  Back: Normal spine flexure, No CVA tenderness  Respiratory: CTAB/L   Cardiovascular: S1 and S2, RRR, no M/G/R  Gastrointestinal: BS+, soft, NT/ND  Extremities: No peripheral edema  Vascular: 2+ peripheral pulses  Neurological: A/O x 3, no focal deficits  Skin: No rashes      LABS:                        9.5    8.37  )-----------( 266      ( 20 Jul 2021 06:55 )             31.3     07-20    143  |  110<H>  |  29<H>  ----------------------------<  116<H>  3.6   |  26  |  1.60<H>    Ca    8.6      20 Jul 2021 06:55  Phos  3.5     07-20  Mg     2.3     07-20    TPro  6.4  /  Alb  3.2<L>  /  TBili  0.3  /  DBili  x   /  AST  19  /  ALT  24  /  AlkPhos  31<L>  07-20        Creatinine Trend: 1.60<--, 1.60<--    MICROBIOLOGY:  RECENT CULTURES:        RADIOLOGY & ADDITIONAL STUDIES:

## 2021-07-20 NOTE — PROGRESS NOTE ADULT - PROBLEM SELECTOR PLAN 2
suspect likely demand in setting or prolonged HTN  will trend CE's to peak and follow up echocardiogram  start aspirin and statin -Suspect likely demand in setting of prolonged HTN  -Will trend CE's to peak and follow up echocardiogram--> trop 0.65 today  -Continue aspirin and statin -Suspect likely demand in setting of prolonged HTN, no signs of acute ischemia  -trops will be trended till peak  -Continue aspirin and statin

## 2021-07-20 NOTE — PROGRESS NOTE ADULT - SUBJECTIVE AND OBJECTIVE BOX
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 SOB began 2 days while patient was lying in bed. States that he felt like he was having difficulty catching his breath and has been constant since onset. SOB not worse with exertion and has no other aggravating or alleviating factors. Has been having associated sharp jabbing pain in the lower rib areas. Pain is mild, non-radiating, gradual in onset, has no aggravating/alleviating factors, and resolves spontaneously in 30 min. Had one episode of diffuse, cramping abdominal pain yesterday evening and nausea that resolved spontaneously. Also, endorses calf soreness but states that it is not really calf pain. Of note, pt has history of HTN, does not follow with cardiologist. States that he checks his BP daily and for the past two months it has been severely elevated with SBP in the 200s. Denies having any fever, chills, cough, cold-like symptoms, headache, visual changes, chest pain, diarrhea, or dysuria. Has no sick contacts.     In the ED,  Vital Signs T(F) Max: 99.1 BP initially 186/104 (173//93) HR: 66 RR: 18 SpO2: 98%  Labs significant for: H/H 9.9/32.6, D-dimer 486, BUN/Cr 27/1.60, trop 0.068, proBNP 1995  CXR: no acute infiltrate on personal read  CT Angio Chest with IV contrast: Patent central airways. Dependent and basilar atelectasis. Otherwise clear lungs. Negative for pulmonary embolism.  EKG: SR with 1st degree AV block at 73bpm, nonspecific twave abn (19 Jul 2021 22:10)      ----  INTERVAL HPI/OVERNIGHT EVENTS: Pt seen and evaluated at the bedside. No acute overnight events occurred. Pt complains of trouble sleeping due to high urinary frequency, has been occurring for years. Pt denies any chest pain, SOB, diarrhea, constipation, nausea, vomiting.     ----  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  Had surgery ( April 2013 )    S/P tonsillectomy    S/P arthroscopic knee surgery  Bilateral ( 2005 )    Torsion of testicle  Had surgery at age 14    Pilonidal cyst  Had surgery ( 1969 )    S/P cataract surgery  Bilateral    H/O hernia repair        FAMILY HISTORY:  FHx: throat cancer    No family history of colorectal cancer        Home Medications:  amLODIPine 10 mg oral tablet: 1 tab(s) orally once a day (20 Jul 2021 00:24)  ARIPiprazole 30 mg oral tablet: 1 tab(s) orally once a day (20 Jul 2021 00:24)  ARISTADA     INJ 1064MG: INJECT 3.9ML INTRAMUSCULARLY AS DIRECTED (20 Jul 2021 00:24)  DOXAZOSIN 4MG TAB: TAKE 1 TABLET TWICE A DAY (FOR PROSTATE) (20 Jul 2021 00:24)  DOXEPIN 25MG CAP: TAKE ONE CAPSULE AT BEDTIME (20 Jul 2021 00:24)  FENOFIBRATE  TAB 145MG: TAKE ONE TABLET DAILY (FOR CHOLESTEROL) (20 Jul 2021 00:24)  hydrALAZINE 100 mg oral tablet: 1 tab(s) orally 3 times a day (20 Jul 2021 00:24)  labetalol 100 mg oral tablet: 1 tab(s) orally once a day (20 Jul 2021 00:24)  LAMOTRIGINE 100MG TA: TAKE ONE TABLET DAILY (20 Jul 2021 00:24)  meloxicam 7.5 mg oral tablet: 1 tab(s) orally 2 times a day (20 Jul 2021 00:24)  metFORMIN 500 mg oral tablet: 1 tab(s) orally once a day (20 Jul 2021 00:24)  MIRTAZAPINE  TAB 30MG: TAKE 1 TABLET AT BEDTIME (20 Jul 2021 00:24)  OMEPRAZOLE 40MG CAP: TAKE 1 CAPSULE DAILY (FOR STOMACH) (20 Jul 2021 00:24)  OXYBUTYNIN ER 15MG TAB: TAKE ONE TABLET DAILY (20 Jul 2021 00:24)  VASCEPA 1GM CAP: 2 cap(s) orally 2 times a day (20 Jul 2021 00:24)  VENLAFAXINE XR 150MG CAPS: TAKE 1 CAPSULE DAILY (20 Jul 2021 00:24)  VITAMIN D 1000UNIT (M) TAB: TAKE 5 TABLETS DAILY (20 Jul 2021 00:24)      Allergies    No Known Allergies    Intolerances        ----  MEDICATIONS  (STANDING):  amLODIPine   Tablet 10 milliGRAM(s) Oral daily  ARIPiprazole 30 milliGRAM(s) Oral daily  aspirin enteric coated 81 milliGRAM(s) Oral daily  atorvastatin 40 milliGRAM(s) Oral at bedtime  cholecalciferol 5000 Unit(s) Oral daily  dextrose 40% Gel 15 Gram(s) Oral once  dextrose 5%. 1000 milliLiter(s) (50 mL/Hr) IV Continuous <Continuous>  dextrose 5%. 1000 milliLiter(s) (100 mL/Hr) IV Continuous <Continuous>  dextrose 50% Injectable 25 Gram(s) IV Push once  dextrose 50% Injectable 12.5 Gram(s) IV Push once  dextrose 50% Injectable 25 Gram(s) IV Push once  doxazosin 4 milliGRAM(s) Oral <User Schedule>  doxepin 25 milliGRAM(s) Oral at bedtime  enoxaparin Injectable 40 milliGRAM(s) SubCutaneous daily  fenofibrate Tablet 145 milliGRAM(s) Oral daily  glucagon  Injectable 1 milliGRAM(s) IntraMuscular once  hydrALAZINE 100 milliGRAM(s) Oral three times a day  insulin lispro (ADMELOG) corrective regimen sliding scale   SubCutaneous three times a day before meals  insulin lispro (ADMELOG) corrective regimen sliding scale   SubCutaneous at bedtime  labetalol 100 milliGRAM(s) Oral daily  lamoTRIgine 100 milliGRAM(s) Oral daily  mirtazapine 30 milliGRAM(s) Oral at bedtime  oxybutynin 5 milliGRAM(s) Oral three times a day  pantoprazole    Tablet 40 milliGRAM(s) Oral before breakfast  venlafaxine XR. 150 milliGRAM(s) Oral daily    MEDICATIONS  (PRN):  acetaminophen   Tablet .. 650 milliGRAM(s) Oral every 6 hours PRN Temp greater or equal to 38.5C (101.3F), Mild Pain (1 - 3)  melatonin 3 milliGRAM(s) Oral at bedtime PRN Insomnia      ----  REVIEW OF SYSTEMS:  Constitutional: denies fever, chills  HEENT: denies headache, dizziness, or lightheadedness  Respiratory: denies SOB, cough, or wheezing  Cardiovascular: denies CP, palpitations  Gastrointestinal: denies nausea, vomiting, diarrhea, constipation, abdominal pain, or bloody stools  Genitourinary: denies painful urination, increased frequency, urgency, or bloody urine  Skin/Breast: denies rashes or itching  Musculoskeletal: denies muscle aches, joint swelling, or muscle weakness  Neurologic: denies loss of sensation, numbness, or tingling    ----  PHYSICAL EXAM:  Gen: well appearing, NAD  HEENT: NCAT, PEERLA b/l, EOMI b/l, no conjunctival erythema  Cardio: regular rate and rhythm, +s1s2, no murmurs, rubs, or gallops  Pulm: CTA b/l, no wheezes, rales or rhonchi  Abdomen: soft, nontender, nondistended, +BS x4 quadrants, no guarding  Extremities: no clubbing, cyanosis or edema, +2 pedal pulses  Neuro: AAOx3, CNII-XII intact grossly, 5/5 strength all extremities, sensation intact  Skin: warm and dry      T(C): 36.5 (07-20-21 @ 09:20), Max: 37.3 (07-19-21 @ 18:28)  HR: 71 (07-20-21 @ 09:20) (64 - 83)  BP: 165/85 (07-20-21 @ 09:20) (165/85 - 223/106)  RR: 18 (07-20-21 @ 09:20) (16 - 18)  SpO2: 93% (07-20-21 @ 09:20) (93% - 98%)  Wt(kg): --    ----  I&O's Summary      LABS:                        9.5    8.37  )-----------( 266      ( 20 Jul 2021 06:55 )             31.3     07-20    143  |  110<H>  |  29<H>  ----------------------------<  116<H>  3.6   |  26  |  1.60<H>    Ca    8.6      20 Jul 2021 06:55  Phos  3.5     07-20  Mg     2.3     07-20    TPro  6.4  /  Alb  3.2<L>  /  TBili  0.3  /  DBili  x   /  AST  19  /  ALT  24  /  AlkPhos  31<L>  07-20        CAPILLARY BLOOD GLUCOSE      POCT Blood Glucose.: 94 mg/dL (20 Jul 2021 11:47)  POCT Blood Glucose.: 136 mg/dL (20 Jul 2021 08:21)  POCT Blood Glucose.: 109 mg/dL (19 Jul 2021 22:50)                ----  Personally reviewed:  Vital sign trends: [  ] yes    [  ] no     [  ] n/a  Laboratory results: [  ] yes    [  ] no     [  ] n/a  Radiology results: [  ] yes    [  ] no     [  ] n/a  Culture results: [  ] yes    [  ] no     [  ] n/a  Consultant recommendations: [  ] yes    [  ] no     [  ] n/a         Patient is a 76y old  Male who presents with a chief complaint of dyspnea, uncontrolled HTN, elevated troponin (20 Jul 2021 11:03)      FROM ADMISSION H+P:   HPI:  77 y/o M with PMHx of Type 2 DM (not on insulin), CAD s/p stents >4 years ago (off plavix), Lupus, HTN, HLD, CKD 3, HepC, diverticulosis, 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 SOB began 2 days while patient was lying in bed. States that he felt like he was having difficulty catching his breath and has been constant since onset. SOB not worse with exertion and has no other aggravating or alleviating factors. Has been having associated sharp jabbing pain in the lower rib areas. Pain is mild, non-radiating, gradual in onset, has no aggravating/alleviating factors, and resolves spontaneously in 30 min. Had one episode of diffuse, cramping abdominal pain yesterday evening and nausea that resolved spontaneously. Also, endorses calf soreness but states that it is not really calf pain. Of note, pt has history of HTN, does not follow with cardiologist. States that he checks his BP daily and for the past two months it has been severely elevated with SBP in the 200s. Denies having any fever, chills, cough, cold-like symptoms, headache, visual changes, chest pain, diarrhea, or dysuria. Has no sick contacts.     In the ED,  Vital Signs T(F) Max: 99.1 BP initially 186/104 (173//93) HR: 66 RR: 18 SpO2: 98%  Labs significant for: H/H 9.9/32.6, D-dimer 486, BUN/Cr 27/1.60, trop 0.068, proBNP 1995  CXR: no acute infiltrate on personal read  CT Angio Chest with IV contrast: Patent central airways. Dependent and basilar atelectasis. Otherwise clear lungs. Negative for pulmonary embolism.  EKG: SR with 1st degree AV block at 73bpm, nonspecific twave abn (19 Jul 2021 22:10)      ----  INTERVAL HPI/OVERNIGHT EVENTS: Pt seen and evaluated at the bedside. No acute overnight events occurred. Pt complains of trouble sleeping due to high urinary frequency, has been occurring for years. Pt denies any chest pain, SOB, diarrhea, constipation, nausea, vomiting.     ----  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  Had surgery ( April 2013 )    S/P tonsillectomy    S/P arthroscopic knee surgery  Bilateral ( 2005 )    Torsion of testicle  Had surgery at age 14    Pilonidal cyst  Had surgery ( 1969 )    S/P cataract surgery  Bilateral    H/O hernia repair        FAMILY HISTORY:  FHx: throat cancer    No family history of colorectal cancer        Home Medications:  amLODIPine 10 mg oral tablet: 1 tab(s) orally once a day (20 Jul 2021 00:24)  ARIPiprazole 30 mg oral tablet: 1 tab(s) orally once a day (20 Jul 2021 00:24)  ARISTADA     INJ 1064MG: INJECT 3.9ML INTRAMUSCULARLY AS DIRECTED (20 Jul 2021 00:24)  DOXAZOSIN 4MG TAB: TAKE 1 TABLET TWICE A DAY (FOR PROSTATE) (20 Jul 2021 00:24)  DOXEPIN 25MG CAP: TAKE ONE CAPSULE AT BEDTIME (20 Jul 2021 00:24)  FENOFIBRATE  TAB 145MG: TAKE ONE TABLET DAILY (FOR CHOLESTEROL) (20 Jul 2021 00:24)  hydrALAZINE 100 mg oral tablet: 1 tab(s) orally 3 times a day (20 Jul 2021 00:24)  labetalol 100 mg oral tablet: 1 tab(s) orally once a day (20 Jul 2021 00:24)  LAMOTRIGINE 100MG TA: TAKE ONE TABLET DAILY (20 Jul 2021 00:24)  meloxicam 7.5 mg oral tablet: 1 tab(s) orally 2 times a day (20 Jul 2021 00:24)  metFORMIN 500 mg oral tablet: 1 tab(s) orally once a day (20 Jul 2021 00:24)  MIRTAZAPINE  TAB 30MG: TAKE 1 TABLET AT BEDTIME (20 Jul 2021 00:24)  OMEPRAZOLE 40MG CAP: TAKE 1 CAPSULE DAILY (FOR STOMACH) (20 Jul 2021 00:24)  OXYBUTYNIN ER 15MG TAB: TAKE ONE TABLET DAILY (20 Jul 2021 00:24)  VASCEPA 1GM CAP: 2 cap(s) orally 2 times a day (20 Jul 2021 00:24)  VENLAFAXINE XR 150MG CAPS: TAKE 1 CAPSULE DAILY (20 Jul 2021 00:24)  VITAMIN D 1000UNIT (M) TAB: TAKE 5 TABLETS DAILY (20 Jul 2021 00:24)      Allergies    No Known Allergies    Intolerances        ----  MEDICATIONS  (STANDING):  amLODIPine   Tablet 10 milliGRAM(s) Oral daily  ARIPiprazole 30 milliGRAM(s) Oral daily  aspirin enteric coated 81 milliGRAM(s) Oral daily  atorvastatin 40 milliGRAM(s) Oral at bedtime  cholecalciferol 5000 Unit(s) Oral daily  dextrose 40% Gel 15 Gram(s) Oral once  dextrose 5%. 1000 milliLiter(s) (50 mL/Hr) IV Continuous <Continuous>  dextrose 5%. 1000 milliLiter(s) (100 mL/Hr) IV Continuous <Continuous>  dextrose 50% Injectable 25 Gram(s) IV Push once  dextrose 50% Injectable 12.5 Gram(s) IV Push once  dextrose 50% Injectable 25 Gram(s) IV Push once  doxazosin 4 milliGRAM(s) Oral <User Schedule>  doxepin 25 milliGRAM(s) Oral at bedtime  enoxaparin Injectable 40 milliGRAM(s) SubCutaneous daily  fenofibrate Tablet 145 milliGRAM(s) Oral daily  glucagon  Injectable 1 milliGRAM(s) IntraMuscular once  hydrALAZINE 100 milliGRAM(s) Oral three times a day  insulin lispro (ADMELOG) corrective regimen sliding scale   SubCutaneous three times a day before meals  insulin lispro (ADMELOG) corrective regimen sliding scale   SubCutaneous at bedtime  labetalol 100 milliGRAM(s) Oral daily  lamoTRIgine 100 milliGRAM(s) Oral daily  mirtazapine 30 milliGRAM(s) Oral at bedtime  oxybutynin 5 milliGRAM(s) Oral three times a day  pantoprazole    Tablet 40 milliGRAM(s) Oral before breakfast  venlafaxine XR. 150 milliGRAM(s) Oral daily    MEDICATIONS  (PRN):  acetaminophen   Tablet .. 650 milliGRAM(s) Oral every 6 hours PRN Temp greater or equal to 38.5C (101.3F), Mild Pain (1 - 3)  melatonin 3 milliGRAM(s) Oral at bedtime PRN Insomnia      ----  REVIEW OF SYSTEMS:  Constitutional: denies fever, chills  HEENT: denies headache, dizziness, or lightheadedness  Respiratory: denies SOB, cough, or wheezing  Cardiovascular: denies CP, palpitations  Gastrointestinal: denies nausea, vomiting, diarrhea, constipation, abdominal pain, or bloody stools  Genitourinary: denies painful urination, increased frequency, urgency, or bloody urine  Skin/Breast: denies rashes or itching  Musculoskeletal: denies muscle aches, joint swelling, or muscle weakness  Neurologic: denies loss of sensation, numbness, or tingling    ----  PHYSICAL EXAM:  Gen: well appearing, NAD  HEENT: NCAT, PEERLA b/l, EOMI b/l, no conjunctival erythema  Cardio: regular rate and rhythm, +s1s2, no murmurs, rubs, or gallops  Pulm: CTA b/l, no wheezes, rales or rhonchi  Abdomen: soft, nontender, nondistended, +BS x4 quadrants, no guarding  Extremities: no clubbing, cyanosis or edema, +2 pedal pulses  Neuro: AAOx3, CNII-XII intact grossly, 5/5 strength all extremities, sensation intact  Skin: warm and dry      T(C): 36.5 (07-20-21 @ 09:20), Max: 37.3 (07-19-21 @ 18:28)  HR: 71 (07-20-21 @ 09:20) (64 - 83)  BP: 165/85 (07-20-21 @ 09:20) (165/85 - 223/106)  RR: 18 (07-20-21 @ 09:20) (16 - 18)  SpO2: 93% (07-20-21 @ 09:20) (93% - 98%)  Wt(kg): --    ----  I&O's Summary      LABS:                        9.5    8.37  )-----------( 266      ( 20 Jul 2021 06:55 )             31.3     07-20    143  |  110<H>  |  29<H>  ----------------------------<  116<H>  3.6   |  26  |  1.60<H>    Ca    8.6      20 Jul 2021 06:55  Phos  3.5     07-20  Mg     2.3     07-20    TPro  6.4  /  Alb  3.2<L>  /  TBili  0.3  /  DBili  x   /  AST  19  /  ALT  24  /  AlkPhos  31<L>  07-20        CAPILLARY BLOOD GLUCOSE      POCT Blood Glucose.: 94 mg/dL (20 Jul 2021 11:47)  POCT Blood Glucose.: 136 mg/dL (20 Jul 2021 08:21)  POCT Blood Glucose.: 109 mg/dL (19 Jul 2021 22:50)                ----  Personally reviewed:  Vital sign trends: [ x ] yes    [  ] no     [  ] n/a  Laboratory results: [ x ] yes    [  ] no     [  ] n/a  Radiology results: [ x ] yes    [  ] no     [  ] n/a  Culture results: [ x ] yes    [  ] no     [  ] n/a  Consultant recommendations: [ x ] yes    [  ] no     [  ] n/a

## 2021-07-20 NOTE — DISCHARGE NOTE PROVIDER - NSDCCPCAREPLAN_GEN_ALL_CORE_FT
PRINCIPAL DISCHARGE DIAGNOSIS  Diagnosis: Hypertension  Assessment and Plan of Treatment: You have high blood pressure. In the hospital we treated you by adjusting your medications and adding new ones. You are currently on amlodipine 10mg once a day, clonidine 0.1 three times a day, hydralazine 100 mg 3 times a day, and labetalol 300 mg 3 times a day. Please take these medications as prescribed. Please follow up with your Primary care doctor in 2 weeks. Please follow up with a cardiologist in 1 week.      SECONDARY DISCHARGE DIAGNOSES  Diagnosis: Diverticulitis  Assessment and Plan of Treatment: You had mild diverticulitis, This is inflammation of the intestines likely due to a small infection. We put you on antibiotics ciprofloxacin and metronidazole. Please take these medications as prescribes. Please follow up with your primary care doctor in 2 weeks.     PRINCIPAL DISCHARGE DIAGNOSIS  Diagnosis: Hypertension  Assessment and Plan of Treatment: You have high blood pressure. In the hospital we treated you by adjusting your medications and adding new ones. You are currently on amlodipine 10mg once a day, clonidine 0.1 three times a day, hydralazine 100 mg 3 times a day, and labetalol 300 mg 3 times a day. Please take these medications as prescribed. Please follow up with your Primary care doctor in 2 weeks. Please follow up with a cardiologist in 1 week.      SECONDARY DISCHARGE DIAGNOSES  Diagnosis: Diverticulitis  Assessment and Plan of Treatment: You had mild diverticulitis, This is inflammation of the intestines likely due to a small infection. We put you on antibiotics ciprofloxacin and metronidazole. Please take these medications as prescribes. Please follow up with your primary care doctor in 2 weeks.    Diagnosis: Chronic kidney disease, unspecified CKD stage  Assessment and Plan of Treatment: You have a history of chronic kidney disease.  Your renal indices worsened during your hospital stay, but were downtrending upon your discharge. Continue medications as prescribed. Avoid nephrotoxic medications such as aleve/motrin/ibuprofen. Follow up with Nephrology within 2 weeks of discharge.

## 2021-07-20 NOTE — PROGRESS NOTE ADULT - ASSESSMENT
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     SOB  - Patient with history HTN and CAD presents with SOB x 2 days  - BNP:  <--1995 No meaningful evidence of volume overload, may be elevated in setting of prolonged HTN  - ECHO 11/30/21 showed normal LV and RV  size and systolic function, estimated LVEF of 65%. Mild LVH Mild AS mild MR  - CTA chest negative for PE  - Continue Labetalol, Amlodipine, Hydralazine, doxazosin   - Low sodium DASH diet  - Daily weight     Elevated Troponin  - Denies chest pain presently with history CAD s/p stents  - Troponin I: <--.096 continue to trend until peak, likely in the setting of hypertensive urgency   - may need ischemic workup due to uptrending trop pending clinical course   - EKG SR 1st degree HB @ 73 bpm Incomplete RBBB unchanged from previous  - f/u echo, pending result  - Continue Plavix 75 mg QD  - Continue statin       HTN  - BP: 165/85 (07-20-21 @ 09:20) (165/85 - 223/106)  - Received Hydralazine 20 mg IV x 1 in ED  - Continue Labetalol 100 mg BID , Amlodipine 10 mg QD, Hydralazine 100 mg Q8h    LE calf pain  - Obtain venous doppler b/l LE     - Monitor and replete lytes, keep K>4, Mg>2.  - All other medical needs as per primary team.  - Other cardiovascular workup will depend on clinical course.  - Will continue to follow.  - Monitor and replete Lytes. Keep K > 4 and Mg > 2  - All other medical needs as per primary team.  - Other cardiovascular workup will depend on clinical course.  - Will continue to follow.    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     SOB/ Hypertension urgency   - Patient with history HTN and CAD presents with SOB x 2 days  - BNP:  <--1995 No meaningful evidence of volume overload, may be elevated in setting of prolonged HTN  - ECHO 11/30/21 showed normal LV and RV  size and systolic function, estimated LVEF of 65%. Mild LVH Mild AS mild MR  - CTA chest negative for PE  - BP: 165/85 (07-20-21 @ 09:20) (165/85 - 223/106)  - Received Hydralazine 20 mg IV x 1 in ED  - Continue Labetalol 100 mg BID , Amlodipine 10 mg QD, Hydralazine 100 mg Q8h, doxazosin   - Low sodium DASH diet  - Daily weight     Elevated Troponin  - Denies chest pain presently with history CAD s/p stents  - Troponin I: <--.096, <--.067, <--.068, continue to trend until peak, likely in the setting of hypertensive urgency   - May need ischemic workup due to up trending trop pending clinical course  - EKG SR 1st degree HB @ 73 bpm Incomplete RBBB unchanged from previous  - f/u echo, pending result  - Continue Plavix 75 mg QD  - Continue statin      - Monitor and replete Lytes. Keep K > 4 and Mg > 2  - All other medical needs as per primary team.  - Other cardiovascular workup will depend on clinical course.  - Will continue to follow.    Avani Link, [employer]  Nurse Pracitioner - Cardiology   Spectra #[phone]/ [phone]

## 2021-07-20 NOTE — PROGRESS NOTE ADULT - PROBLEM SELECTOR PLAN 7
Cr is improved from last admission  suspect secondary to longstanding HTN +/- DM type 2  will continue home medications for BP  continue glycemic control with insulin coverage  s/p 1L NS bolus in the ED - hold off further IV fluids  monitor renal indices, avoid nephrotoxic agents -Cr is improved from last admission  -suspect 2/2 longstanding HTN +/- DM type 2  -will continue home medications for BP  -continue glycemic control with insulin coverage  -s/p 1L NS bolus in the ED - hold off further IV fluids  -monitor renal indices, avoid nephrotoxic agents -Cr is improved from last admission  -suspect 2/2 longstanding HTN +/- DM type 2  -will continue home medications for BP  -s/p 1L NS bolus in the ED - hold off further IV fluids  -monitor renal indices, avoid nephrotoxic agents

## 2021-07-20 NOTE — PROGRESS NOTE ADULT - PROBLEM SELECTOR PLAN 8
continue home cardura  on oxybutnin 15mg ER at home - will c/w oxybutnin 5mg TID -continue home cardura  -on oxybutnin 15mg ER at home - will c/w oxybutnin 5mg TID  -f/u OP with urology

## 2021-07-20 NOTE — DISCHARGE NOTE PROVIDER - HOSPITAL COURSE
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 SOB began 2 days while patient was lying in bed. States that he felt like he was having difficulty catching his breath and has been constant since onset. SOB not worse with exertion and has no other aggravating or alleviating factors. Has been having associated sharp jabbing pain in the lower rib areas. Pain is mild, non-radiating, gradual in onset, has no aggravating/alleviating factors, and resolves spontaneously in 30 min. Had one episode of diffuse, cramping abdominal pain yesterday evening and nausea that resolved spontaneously. Also, endorses calf soreness but states that it is not really calf pain. Of note, pt has history of HTN, does not follow with cardiologist. States that he checks his BP daily and for the past two months it has been severely elevated with SBP in the 200s. Denies having any fever, chills, cough, cold-like symptoms, headache, visual changes, chest pain, diarrhea, or dysuria. Has no sick contacts.     In the ED,  Vital Signs T(F) Max: 99.1 BP initially 186/104 (173//93) HR: 66 RR: 18 SpO2: 98%  Labs significant for: H/H 9.9/32.6, D-dimer 486, BUN/Cr 27/1.60, trop 0.068, proBNP 1995  CXR: no acute infiltrate on personal read  CT Angio Chest with IV contrast: Patent central airways. Dependent and basilar atelectasis. Otherwise clear lungs. Negative for pulmonary embolism.  EKG: SR with 1st degree AV block at 73bpm, nonspecific twave abn (19 Jul 2021 22:10)      ---  HOSPITAL COURSE:  Pt was admitted to [facility] for hypertensive urgency. On arrival pt BP was 186/104 s/p IV hydralazine in ED. On 7/20 patient bp lowered to 175/78 and on amlodipine, doxazosin, hydralazine, and labetalol.      ---  CONSULTANTS:     ---  TIME SPENT:  I, the attending physician, was physically present for the key portions of the evaluation and management (E/M) service provided. 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 SOB began 2 days while patient was lying in bed. States that he felt like he was having difficulty catching his breath and has been constant since onset. SOB not worse with exertion and has no other aggravating or alleviating factors. Has been having associated sharp jabbing pain in the lower rib areas. Pain is mild, non-radiating, gradual in onset, has no aggravating/alleviating factors, and resolves spontaneously in 30 min. Had one episode of diffuse, cramping abdominal pain yesterday evening and nausea that resolved spontaneously. Also, endorses calf soreness but states that it is not really calf pain. Of note, pt has history of HTN, does not follow with cardiologist. States that he checks his BP daily and for the past two months it has been severely elevated with SBP in the 200s. Denies having any fever, chills, cough, cold-like symptoms, headache, visual changes, chest pain, diarrhea, or dysuria. Has no sick contacts.     In the ED,  Vital Signs T(F) Max: 99.1 BP initially 186/104 (173//93) HR: 66 RR: 18 SpO2: 98%  Labs significant for: H/H 9.9/32.6, D-dimer 486, BUN/Cr 27/1.60, trop 0.068, proBNP 1995  CXR: no acute infiltrate on personal read  CT Angio Chest with IV contrast: Patent central airways. Dependent and basilar atelectasis. Otherwise clear lungs. Negative for pulmonary embolism.  EKG: SR with 1st degree AV block at 73bpm, nonspecific twave abn (19 Jul 2021 22:10)      ---  HOSPITAL COURSE:  Pt was admitted to [facility] for hypertensive urgency. On arrival pt BP was 186/104 s/p IV hydralazine in ED. On 7/20 patient bp lowered to 175/78 and on amlodipine, doxazosin, hydralazine, and labetalol. The patient's blood pressure continued to remain elevated so we added labetalol 100 PO TID and losartan 25 PO.      ---  CONSULTANTS:     ---  TIME SPENT:  I, the attending physician, was physically present for the key portions of the evaluation and management (E/M) service provided. 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 SOB began 2 days while patient was lying in bed. States that he felt like he was having difficulty catching his breath and has been constant since onset. SOB not worse with exertion and has no other aggravating or alleviating factors. Has been having associated sharp jabbing pain in the lower rib areas. Pain is mild, non-radiating, gradual in onset, has no aggravating/alleviating factors, and resolves spontaneously in 30 min. Had one episode of diffuse, cramping abdominal pain yesterday evening and nausea that resolved spontaneously. Also, endorses calf soreness but states that it is not really calf pain. Of note, pt has history of HTN, does not follow with cardiologist. States that he checks his BP daily and for the past two months it has been severely elevated with SBP in the 200s. Denies having any fever, chills, cough, cold-like symptoms, headache, visual changes, chest pain, diarrhea, or dysuria. Has no sick contacts.     In the ED,  Vital Signs T(F) Max: 99.1 BP initially 186/104 (173//93) HR: 66 RR: 18 SpO2: 98%  Labs significant for: H/H 9.9/32.6, D-dimer 486, BUN/Cr 27/1.60, trop 0.068, proBNP 1995  CXR: no acute infiltrate on personal read  CT Angio Chest with IV contrast: Patent central airways. Dependent and basilar atelectasis. Otherwise clear lungs. Negative for pulmonary embolism.  EKG: SR with 1st degree AV block at 73bpm, nonspecific twave abn (19 Jul 2021 22:10)      ---  HOSPITAL COURSE:  Pt was admitted to [facility] for hypertensive urgency. On arrival pt BP was 186/104 s/p IV hydralazine in ED. On 7/20 patient bp lowered to 175/78 and on amlodipine, doxazosin, hydralazine, and labetalol. The patient's blood pressure continued to remain elevated so we added labetalol 100 PO TID and losartan 25 PO. Patient reported RLQ and midline abdominal pain 7/10 intermittent and non radiating. We  CT abdomen and pelvis that showed a mild uncomplicated divirticulitis and mild hepatomegaly. Renal U/S showed ****** and Abdominal U/S showed *****      ---  CONSULTANTS:     ---  TIME SPENT:  I, the attending physician, was physically present for the key portions of the evaluation and management (E/M) service provided. 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 SOB began 2 days while patient was lying in bed. States that he felt like he was having difficulty catching his breath and has been constant since onset. SOB not worse with exertion and has no other aggravating or alleviating factors. Has been having associated sharp jabbing pain in the lower rib areas. Pain is mild, non-radiating, gradual in onset, has no aggravating/alleviating factors, and resolves spontaneously in 30 min. Had one episode of diffuse, cramping abdominal pain yesterday evening and nausea that resolved spontaneously. Also, endorses calf soreness but states that it is not really calf pain. Of note, pt has history of HTN, does not follow with cardiologist. States that he checks his BP daily and for the past two months it has been severely elevated with SBP in the 200s. Denies having any fever, chills, cough, cold-like symptoms, headache, visual changes, chest pain, diarrhea, or dysuria. Has no sick contacts.     In the ED,  Vital Signs T(F) Max: 99.1 BP initially 186/104 (173//93) HR: 66 RR: 18 SpO2: 98%  Labs significant for: H/H 9.9/32.6, D-dimer 486, BUN/Cr 27/1.60, trop 0.068, proBNP 1995  CXR: no acute infiltrate on personal read  CT Angio Chest with IV contrast: Patent central airways. Dependent and basilar atelectasis. Otherwise clear lungs. Negative for pulmonary embolism.  EKG: SR with 1st degree AV block at 73bpm, nonspecific twave abn (19 Jul 2021 22:10)      ---  HOSPITAL COURSE:  Pt was admitted to [facility] for hypertensive urgency. On arrival pt BP was 186/104 s/p IV hydralazine in ED. On 7/20 patient bp lowered to 175/78 and on amlodipine, doxazosin, hydralazine, and labetalol. The patient's blood pressure continued to remain elevated so we added labetalol 100 PO TID and losartan 25 PO. On 7/22, the patient reported RLQ and midline abdominal pain 7/10 intermittent and non radiating. A CT abdomen pelvis, renal U/S, and abdominal U/S. The CT abdomen and pelvis that showed a mild uncomplicated diverticulitis and mild hepatomegaly. Pt was given ciprofloxacin for the divitiulitis. Renal U/S showed ****** and Abdominal U/S showed *****. Pt blood pressure in 170s after labetalol 200 tid, hydralazine 100 tid, and amlodipine 10 mg qdaily so losartan 25 was also added to the regimen. on 7/23 the Pt blood pressure reduced to 150s-160s.       ---  CONSULTANTS:   Cardio- Dr. Valdovinos  Nephro- Dr. Hwang  ---  TIME SPENT:  I, the attending physician, was physically present for the key portions of the evaluation and management (E/M) service provided. 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 SOB began 2 days while patient was lying in bed. States that he felt like he was having difficulty catching his breath and has been constant since onset. SOB not worse with exertion and has no other aggravating or alleviating factors. Has been having associated sharp jabbing pain in the lower rib areas. Pain is mild, non-radiating, gradual in onset, has no aggravating/alleviating factors, and resolves spontaneously in 30 min. Had one episode of diffuse, cramping abdominal pain yesterday evening and nausea that resolved spontaneously. Also, endorses calf soreness but states that it is not really calf pain. Of note, pt has history of HTN, does not follow with cardiologist. States that he checks his BP daily and for the past two months it has been severely elevated with SBP in the 200s. Denies having any fever, chills, cough, cold-like symptoms, headache, visual changes, chest pain, diarrhea, or dysuria. Has no sick contacts.     In the ED,  Vital Signs T(F) Max: 99.1 BP initially 186/104 (173//93) HR: 66 RR: 18 SpO2: 98%  Labs significant for: H/H 9.9/32.6, D-dimer 486, BUN/Cr 27/1.60, trop 0.068, proBNP 1995  CXR: no acute infiltrate on personal read  CT Angio Chest with IV contrast: Patent central airways. Dependent and basilar atelectasis. Otherwise clear lungs. Negative for pulmonary embolism.  EKG: SR with 1st degree AV block at 73bpm, nonspecific twave abn (19 Jul 2021 22:10)      ---  HOSPITAL COURSE:  Pt was admitted to [facility] for hypertensive urgency. On arrival pt BP was 186/104 s/p IV hydralazine in ED. On 7/20 patient bp lowered to 175/78 and on amlodipine, doxazosin, hydralazine, and labetalol. The patient's blood pressure continued to remain elevated so we added labetalol 100 PO TID and losartan 25 PO. On 7/22, the patient reported RLQ and midline abdominal pain 7/10 intermittent and non radiating. A CT abdomen pelvis and abdominal U/S. The CT abdomen and pelvis that showed a mild uncomplicated diverticulitis and mild hepatomegaly. Pt was given ciprofloxacin for the diverticulitis. Abdominal U/S stated that there was no renal artery stenosis. Pt blood pressure in 170s after labetalol 200 tid, hydralazine 100 tid, and amlodipine 10 mg qdaily so losartan 25 was also added to the regimen. on 7/23 the Pt blood pressure reduced to 150s-160s. On 7/24 the patient's creatinine bumped up to 2.1, Nephrology was consulted and they discontinued the losartan and the creatinine went back down. Patient has been having SOB when lying down, Cardiology recommended a CXR which showed no evidence of consolidation or pleural effusion. This SOB may be related to patients obesity. Patient's abdominal pain has also resolved.       ---  CONSULTANTS:   Cardio- Dr. Valdovinos  Nephro- Dr. Hwang  ---  TIME SPENT:  I, the attending physician, was physically present for the key portions of the evaluation and management (E/M) service provided. 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 SOB began 2 days while patient was lying in bed. States that he felt like he was having difficulty catching his breath and has been constant since onset. SOB not worse with exertion and has no other aggravating or alleviating factors. Has been having associated sharp jabbing pain in the lower rib areas. Pain is mild, non-radiating, gradual in onset, has no aggravating/alleviating factors, and resolves spontaneously in 30 min. Had one episode of diffuse, cramping abdominal pain yesterday evening and nausea that resolved spontaneously. Also, endorses calf soreness but states that it is not really calf pain. Of note, pt has history of HTN, does not follow with cardiologist. States that he checks his BP daily and for the past two months it has been severely elevated with SBP in the 200s. Denies having any fever, chills, cough, cold-like symptoms, headache, visual changes, chest pain, diarrhea, or dysuria. Has no sick contacts.     In the ED,  Vital Signs T(F) Max: 99.1 BP initially 186/104 (173//93) HR: 66 RR: 18 SpO2: 98%  Labs significant for: H/H 9.9/32.6, D-dimer 486, BUN/Cr 27/1.60, trop 0.068, proBNP 1995  CXR: no acute infiltrate on personal read  CT Angio Chest with IV contrast: Patent central airways. Dependent and basilar atelectasis. Otherwise clear lungs. Negative for pulmonary embolism.  EKG: SR with 1st degree AV block at 73bpm, nonspecific twave abn (19 Jul 2021 22:10)      ---  HOSPITAL COURSE:  Pt was admitted to [facility] for hypertensive urgency. On arrival pt BP was 186/104 s/p IV hydralazine in ED. On 7/20 patient bp lowered to 175/78 and on amlodipine, doxazosin, hydralazine, and labetalol. The patient's blood pressure continued to remain elevated so we added labetalol 100 PO TID and losartan 25 PO. On 7/22, the patient reported RLQ and midline abdominal pain 7/10 intermittent and non radiating. A CT abdomen pelvis and abdominal U/S. The CT abdomen and pelvis that showed a mild uncomplicated diverticulitis and mild hepatomegaly. Pt was given ciprofloxacin for the diverticulitis. Abdominal U/S stated that there was no renal artery stenosis. Pt blood pressure in 170s after labetalol 200 tid, hydralazine 100 tid, and amlodipine 10 mg qdaily so losartan 25 was also added to the regimen. on 7/23 the Pt blood pressure reduced to 150s-160s. On 7/24 the patient's creatinine bumped up to 2.1, Nephrology was consulted and they discontinued the losartan. The BP started trending upward again in the 160s, we added clonidine 0.1 TID. Patient has been having SOB when lying down, Cardiology recommended a CXR which showed no evidence of consolidation or pleural effusion and possible lasix 40 IV x 1.       ---  CONSULTANTS:   Cardio- Dr. Valdovinos  Nephro- Dr. Hwang  ---  TIME SPENT:  I, the attending physician, was physically present for the key portions of the evaluation and management (E/M) service provided. 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 SOB began 2 days while patient was lying in bed. States that he felt like he was having difficulty catching his breath and has been constant since onset. SOB not worse with exertion and has no other aggravating or alleviating factors. Has been having associated sharp jabbing pain in the lower rib areas. Pain is mild, non-radiating, gradual in onset, has no aggravating/alleviating factors, and resolves spontaneously in 30 min. Had one episode of diffuse, cramping abdominal pain yesterday evening and nausea that resolved spontaneously. Also, endorses calf soreness but states that it is not really calf pain. Of note, pt has history of HTN, does not follow with cardiologist. States that he checks his BP daily and for the past two months it has been severely elevated with SBP in the 200s. Denies having any fever, chills, cough, cold-like symptoms, headache, visual changes, chest pain, diarrhea, or dysuria. Has no sick contacts.     In the ED,  Vital Signs T(F) Max: 99.1 BP initially 186/104 (173//93) HR: 66 RR: 18 SpO2: 98%  Labs significant for: H/H 9.9/32.6, D-dimer 486, BUN/Cr 27/1.60, trop 0.068, proBNP 1995  CXR: no acute infiltrate on personal read  CT Angio Chest with IV contrast: Patent central airways. Dependent and basilar atelectasis. Otherwise clear lungs. Negative for pulmonary embolism.  EKG: SR with 1st degree AV block at 73bpm, nonspecific twave abn (19 Jul 2021 22:10)      ---  HOSPITAL COURSE:  Pt was admitted to [facility] for hypertensive urgency. On arrival pt BP was 186/104 s/p IV hydralazine in ED. On 7/20 patient bp lowered to 175/78 and on amlodipine, doxazosin, hydralazine, and labetalol. The patient's blood pressure continued to remain elevated so we added labetalol 100 PO TID and losartan 25 PO. On 7/22, the patient reported RLQ and midline abdominal pain 7/10 intermittent and non radiating. A CT abdomen pelvis and abdominal U/S. The CT abdomen and pelvis that showed a mild uncomplicated diverticulitis and mild hepatomegaly. Pt was given ciprofloxacin for the diverticulitis. Abdominal U/S stated that there was no renal artery stenosis. Pt blood pressure in 170s after labetalol 200 tid, hydralazine 100 tid, and amlodipine 10 mg qdaily so losartan 25 was also added to the regimen. on 7/23 the Pt blood pressure reduced to 150s-160s. On 7/24 the patient's creatinine bumped up to 2.1, Nephrology was consulted and they discontinued the losartan. The BP started trending upward again in the 160s, we added clonidine 0.1 TID. Patient has been having SOB when lying down, Cardiology recommended a CXR which showed no evidence of consolidation or pleural effusion and IV lasix 40 x1. Patient blood pressure remained controlled after the clonidine in the 130-140s and the creatinine improved to 2.1 after the lasix dose. Nephro recommended that patients creatinine level are safe for discharge.      ---  CONSULTANTS:   Cardio- Dr. Valdovinos  Nephro- Dr. Hwang  ---  TIME SPENT:  I, the attending physician, was physically present for the key portions of the evaluation and management (E/M) service provided. 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 SOB began 2 days while patient was lying in bed. States that he felt like he was having difficulty catching his breath and has been constant since onset. SOB not worse with exertion and has no other aggravating or alleviating factors. Has been having associated sharp jabbing pain in the lower rib areas. Pain is mild, non-radiating, gradual in onset, has no aggravating/alleviating factors, and resolves spontaneously in 30 min. Had one episode of diffuse, cramping abdominal pain yesterday evening and nausea that resolved spontaneously. Also, endorses calf soreness but states that it is not really calf pain. Of note, pt has history of HTN, does not follow with cardiologist. States that he checks his BP daily and for the past two months it has been severely elevated with SBP in the 200s. Denies having any fever, chills, cough, cold-like symptoms, headache, visual changes, chest pain, diarrhea, or dysuria. Has no sick contacts.     In the ED,  Vital Signs T(F) Max: 99.1 BP initially 186/104 (173//93) HR: 66 RR: 18 SpO2: 98%  Labs significant for: H/H 9.9/32.6, D-dimer 486, BUN/Cr 27/1.60, trop 0.068, proBNP 1995  CXR: no acute infiltrate on personal read  CT Angio Chest with IV contrast: Patent central airways. Dependent and basilar atelectasis. Otherwise clear lungs. Negative for pulmonary embolism.  EKG: SR with 1st degree AV block at 73bpm, nonspecific twave abn (19 Jul 2021 22:10)      ---  HOSPITAL COURSE:  Pt was admitted to [facility] for hypertensive urgency. On arrival pt BP was 186/104 s/p IV hydralazine in ED. On 7/20 patient bp lowered to 175/78 and on amlodipine, doxazosin, hydralazine, and labetalol. The patient's blood pressure continued to remain elevated so we added labetalol 100 PO TID and losartan 25 PO. On 7/22, the patient reported RLQ and midline abdominal pain 7/10 intermittent and non radiating. A CT abdomen pelvis and abdominal U/S. The CT abdomen and pelvis that showed a mild uncomplicated diverticulitis and mild hepatomegaly. Pt was given ciprofloxacin for the diverticulitis. Abdominal U/S stated that there was no renal artery stenosis. Pt blood pressure in 170s after labetalol 200 tid, hydralazine 100 tid, and amlodipine 10 mg qdaily so losartan 25 was also added to the regimen. on 7/23 the Pt blood pressure reduced to 150s-160s. On 7/24 the patient's creatinine bumped up to 2.1, Nephrology was consulted and they discontinued the losartan. The BP started trending upward again in the 160s, we added clonidine 0.1 TID. Patient has been having SOB when lying down, Cardiology recommended a CXR which showed no evidence of consolidation or pleural effusion and IV lasix 40 x1. Patient blood pressure remained controlled after the clonidine in the 130-140s and the creatinine improved to 2.1 after the lasix dose. Nephro recommended that patients creatinine level are safe for discharge.      ---  CONSULTANTS:   Cardio- Dr. Valdovinos  Nephro- Dr. Hwang  ---  TIME SPENT:  I, the attending physician, was physically present for the key portions of the evaluation and management (E/M) service provided. The total amount of time spent reviewing the hospital notes, laboratory values, imaging findings, assessing/counseling the patient, discussing with consultant physicians, social work, nursing staff was 36 minutes    ---

## 2021-07-20 NOTE — PROGRESS NOTE ADULT - PROBLEM SELECTOR PLAN 1
-BP on arrival was 186/104, s/p IV hydralazine  -continue PO labetalol, hydralazine and amlodipine  -to check 2D ECHO and cycle cardiac enzymes  cardio Dr. Monterroso following, recs appreciated -BP on arrival was 186/104, s/p IV hydralazine  -continue PO labetalol, hydralazine and amlodipine  -to check 2D ECHO and cycle cardiac enzymes  -cardio Dr. Monterroso following -BP on arrival was 186/104, s/p IV hydralazine in ED  -continue PO labetalol, hydralazine and amlodipine  -to check 2D ECHO and cycle cardiac enzymes  -cardio Dr. Monterroso following

## 2021-07-20 NOTE — DISCHARGE NOTE PROVIDER - PROVIDER TOKENS
PROVIDER:[TOKEN:[[id number]],FOLLOWUP:[2 weeks]],PROVIDER:[TOKEN:[[id number]],FOLLOWUP:[1 week]] PROVIDER:[TOKEN:[[id number]],FOLLOWUP:[2 weeks]],PROVIDER:[TOKEN:[[id number]],FOLLOWUP:[1 week]],PROVIDER:[TOKEN:[[id number]],FOLLOWUP:[2 weeks]]

## 2021-07-20 NOTE — PROGRESS NOTE ADULT - PROBLEM SELECTOR PLAN 3
s/p PCI with stents  was taken off of Plavix and statin  will continue with home fenofibrate. Pt is also on vascepa at home but this is non-formulary.  will START aspirin 81mg daily and atorvastatin 40mg daily -s/p PCI with stents  -taken off Plavix and statin  -continue home fenofibrate. Pt not on home vascepa (non-formulary)  -Continue aspirin 81mg daily and atorvastatin 40mg daily -s/p PCI with stents  -taken off Plavix and statin  -continue home fenofibrate  -Continue aspirin 81mg daily and atorvastatin 40mg daily

## 2021-07-20 NOTE — DISCHARGE NOTE PROVIDER - CARE PROVIDER_API CALL
Erich Man)  Medicine  [address] [place] [address]  Phone: [phone]  Fax: [phone]  Follow Up Time: 2 weeks    Daryl Monterroso)  Cardiovascular Disease; Internal Medicine  [address] [place], [place] [id number]  Phone: [phone]  Fax: [phone]  Follow Up Time: 1 week   Erich Man)  Medicine  [address] [place] [address]  Phone: [phone]  Fax: [phone]  Follow Up Time: 2 weeks    Daryl Monterroso)  Cardiovascular Disease; Internal Medicine  [address] [place], [place] [id number]  Phone: [phone]  Fax: [phone]  Follow Up Time: 1 week    Rafat Rodarte  MEDICINE  [address] [facility], [address] [place] [address]  Phone: [phone]  Fax: [phone]  Follow Up Time: 2 weeks

## 2021-07-21 LAB
ANION GAP SERPL CALC-SCNC: 8 MMOL/L — SIGNIFICANT CHANGE UP (ref 5–17)
BASOPHILS # BLD AUTO: 0.04 K/UL — SIGNIFICANT CHANGE UP (ref 0–0.2)
BASOPHILS NFR BLD AUTO: 0.5 % — SIGNIFICANT CHANGE UP (ref 0–2)
BUN SERPL-MCNC: 24 MG/DL — HIGH (ref 7–23)
CALCIUM SERPL-MCNC: 9.3 MG/DL — SIGNIFICANT CHANGE UP (ref 8.5–10.1)
CHLORIDE SERPL-SCNC: 108 MMOL/L — SIGNIFICANT CHANGE UP (ref 96–108)
CO2 SERPL-SCNC: 23 MMOL/L — SIGNIFICANT CHANGE UP (ref 22–31)
CREAT SERPL-MCNC: 1.6 MG/DL — HIGH (ref 0.5–1.3)
EOSINOPHIL # BLD AUTO: 0.23 K/UL — SIGNIFICANT CHANGE UP (ref 0–0.5)
EOSINOPHIL NFR BLD AUTO: 3.1 % — SIGNIFICANT CHANGE UP (ref 0–6)
FERRITIN SERPL-MCNC: 16 NG/ML — LOW (ref 30–400)
GLUCOSE SERPL-MCNC: 105 MG/DL — HIGH (ref 70–99)
HCT VFR BLD CALC: 33.1 % — LOW (ref 39–50)
HGB BLD-MCNC: 10.3 G/DL — LOW (ref 13–17)
IMM GRANULOCYTES NFR BLD AUTO: 0.1 % — SIGNIFICANT CHANGE UP (ref 0–1.5)
LYMPHOCYTES # BLD AUTO: 2 K/UL — SIGNIFICANT CHANGE UP (ref 1–3.3)
LYMPHOCYTES # BLD AUTO: 26.6 % — SIGNIFICANT CHANGE UP (ref 13–44)
MCHC RBC-ENTMCNC: 25.4 PG — LOW (ref 27–34)
MCHC RBC-ENTMCNC: 31.1 GM/DL — LOW (ref 32–36)
MCV RBC AUTO: 81.5 FL — SIGNIFICANT CHANGE UP (ref 80–100)
MONOCYTES # BLD AUTO: 0.49 K/UL — SIGNIFICANT CHANGE UP (ref 0–0.9)
MONOCYTES NFR BLD AUTO: 6.5 % — SIGNIFICANT CHANGE UP (ref 2–14)
NEUTROPHILS # BLD AUTO: 4.75 K/UL — SIGNIFICANT CHANGE UP (ref 1.8–7.4)
NEUTROPHILS NFR BLD AUTO: 63.2 % — SIGNIFICANT CHANGE UP (ref 43–77)
NRBC # BLD: 0 /100 WBCS — SIGNIFICANT CHANGE UP (ref 0–0)
PLATELET # BLD AUTO: 226 K/UL — SIGNIFICANT CHANGE UP (ref 150–400)
POTASSIUM SERPL-MCNC: 3.7 MMOL/L — SIGNIFICANT CHANGE UP (ref 3.5–5.3)
POTASSIUM SERPL-SCNC: 3.7 MMOL/L — SIGNIFICANT CHANGE UP (ref 3.5–5.3)
RBC # BLD: 4.06 M/UL — LOW (ref 4.2–5.8)
RBC # FLD: 17.1 % — HIGH (ref 10.3–14.5)
SODIUM SERPL-SCNC: 139 MMOL/L — SIGNIFICANT CHANGE UP (ref 135–145)
WBC # BLD: 7.52 K/UL — SIGNIFICANT CHANGE UP (ref 3.8–10.5)
WBC # FLD AUTO: 7.52 K/UL — SIGNIFICANT CHANGE UP (ref 3.8–10.5)

## 2021-07-21 PROCEDURE — 99232 SBSQ HOSP IP/OBS MODERATE 35: CPT

## 2021-07-21 PROCEDURE — 99233 SBSQ HOSP IP/OBS HIGH 50: CPT | Mod: GC

## 2021-07-21 RX ORDER — LABETALOL HCL 100 MG
200 TABLET ORAL THREE TIMES A DAY
Refills: 0 | Status: DISCONTINUED | OUTPATIENT
Start: 2021-07-21 | End: 2021-07-23

## 2021-07-21 RX ORDER — LOSARTAN POTASSIUM 100 MG/1
25 TABLET, FILM COATED ORAL DAILY
Refills: 0 | Status: DISCONTINUED | OUTPATIENT
Start: 2021-07-21 | End: 2021-07-23

## 2021-07-21 RX ADMIN — Medication 5 MILLIGRAM(S): at 05:45

## 2021-07-21 RX ADMIN — LAMOTRIGINE 100 MILLIGRAM(S): 25 TABLET, ORALLY DISINTEGRATING ORAL at 12:12

## 2021-07-21 RX ADMIN — ENOXAPARIN SODIUM 40 MILLIGRAM(S): 100 INJECTION SUBCUTANEOUS at 12:12

## 2021-07-21 RX ADMIN — Medication 100 MILLIGRAM(S): at 21:29

## 2021-07-21 RX ADMIN — Medication 100 MILLIGRAM(S): at 13:07

## 2021-07-21 RX ADMIN — Medication 200 MILLIGRAM(S): at 13:07

## 2021-07-21 RX ADMIN — ATORVASTATIN CALCIUM 40 MILLIGRAM(S): 80 TABLET, FILM COATED ORAL at 21:29

## 2021-07-21 RX ADMIN — Medication 5 MILLIGRAM(S): at 21:29

## 2021-07-21 RX ADMIN — Medication 4 MILLIGRAM(S): at 17:18

## 2021-07-21 RX ADMIN — MIRTAZAPINE 30 MILLIGRAM(S): 45 TABLET, ORALLY DISINTEGRATING ORAL at 21:28

## 2021-07-21 RX ADMIN — Medication 5000 UNIT(S): at 12:12

## 2021-07-21 RX ADMIN — Medication 100 MILLIGRAM(S): at 05:45

## 2021-07-21 RX ADMIN — ARIPIPRAZOLE 30 MILLIGRAM(S): 15 TABLET ORAL at 12:13

## 2021-07-21 RX ADMIN — Medication 200 MILLIGRAM(S): at 21:28

## 2021-07-21 RX ADMIN — Medication 25 MILLIGRAM(S): at 21:29

## 2021-07-21 RX ADMIN — AMLODIPINE BESYLATE 10 MILLIGRAM(S): 2.5 TABLET ORAL at 05:45

## 2021-07-21 RX ADMIN — Medication 5 MILLIGRAM(S): at 13:11

## 2021-07-21 RX ADMIN — Medication 4 MILLIGRAM(S): at 05:45

## 2021-07-21 RX ADMIN — LOSARTAN POTASSIUM 25 MILLIGRAM(S): 100 TABLET, FILM COATED ORAL at 14:54

## 2021-07-21 RX ADMIN — PANTOPRAZOLE SODIUM 40 MILLIGRAM(S): 20 TABLET, DELAYED RELEASE ORAL at 05:45

## 2021-07-21 RX ADMIN — Medication 150 MILLIGRAM(S): at 12:11

## 2021-07-21 RX ADMIN — Medication 81 MILLIGRAM(S): at 12:13

## 2021-07-21 RX ADMIN — Medication 145 MILLIGRAM(S): at 12:11

## 2021-07-21 NOTE — PROGRESS NOTE ADULT - PROBLEM SELECTOR PLAN 3
-s/p PCI with stents  -taken off Plavix and statin  -continue home fenofibrate  -Continue aspirin 81mg daily and atorvastatin 40mg daily

## 2021-07-21 NOTE — PROGRESS NOTE ADULT - PROBLEM SELECTOR PLAN 7
-Cr is improved from last admission  -suspect 2/2 longstanding HTN +/- DM type 2  -will continue home medications for BP  -s/p 1L NS bolus in the ED - hold off further IV fluids  -monitor renal indices, avoid nephrotoxic agents

## 2021-07-21 NOTE — PROGRESS NOTE ADULT - PROBLEM SELECTOR PLAN 8
-continue home cardura  -on oxybutnin 15mg ER at home - will c/w oxybutnin 5mg TID  -f/u OP with urology

## 2021-07-21 NOTE — PROGRESS NOTE ADULT - PROBLEM SELECTOR PLAN 1
-BP on arrival was 186/104, s/p IV hydralazine in ED  -continue hydralazine and amlodipine  - Added PO losartan 25 mg and PO labetalol increased to 100 mg TID for SBP in 170s  -to check 2D ECHO  -cycle cardiac enzymes  -cardio Dr. Monterroso following

## 2021-07-21 NOTE — PROGRESS NOTE ADULT - ASSESSMENT
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 Admitted for Hypertensive urgency

## 2021-07-21 NOTE — PROGRESS NOTE ADULT - ASSESSMENT
76 male with a history of HTN, CAD, CHF, HLD, DM. PVD, SLE, Hepatitis C, depression and CKD now with admitted for accelerated HTN and possible ACS. S/P IV Contrast dye and was on metformin as out patient.   Renal indices are stable at this time. He is at moderate risk for any further contrast dye including cardiac cath.   Suggest to switch to Coreg instead of labetalol. Will  follow.

## 2021-07-21 NOTE — PROGRESS NOTE ADULT - PROBLEM SELECTOR PLAN 2
-Suspect likely demand in setting of prolonged HTN, no signs of acute ischemia  -trops will be trended till peak- peaked at 0.096 on 7/20  -Continue aspirin and statin

## 2021-07-21 NOTE — PROGRESS NOTE ADULT - PROBLEM SELECTOR PLAN 5
-stable, was admitted in the past for GI bleed  -no evidence of bleeding at present  -iron studies consistent with iron deficiency anemia - will check FOBT  -ferritin of 11 - will consider venofer  -vit b12 and folate WNL  -monitor H/H  -will continue with lovenox for now

## 2021-07-21 NOTE — PROGRESS NOTE ADULT - SUBJECTIVE AND OBJECTIVE BOX
[facility] -- Fifi Bliss Grossman, [place], Abraham Sheridan, Kev Monterroso: Office # [phone]    Follow Up:  BP control on oral antihypertensives    Subjective/Observations: Patient seen and examined. Patient awake, alert, sitting in bed. No complaints of chest pain, dyspnea, palpitations or dizziness. No orthopnea and PND. Tolerating room air.     REVIEW OF SYSTEMS: All review of systems is negative for eye, ENT, GI, , allergic, dermatologic, musculoskeletal and neurologic except as described above    PAST MEDICAL & SURGICAL HISTORY:  Hypertension  Diabetes mellitus  Lupus  Hepatitis C  Anxiety and depression  CAD (coronary artery disease)s/p stents  Diverticulosis  Hyperlipidemia  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  Had surgery ( April 2013 )  S/P tonsillectomy  S/P arthroscopic knee surgery  Bilateral ( 2005 )  Torsion of testicle  Had surgery at age 14  Pilonidal cyst  Had surgery ( 1969 )  S/P cataract surgery  Bilateral  H/O hernia repair    MEDICATIONS  (STANDING):  amLODIPine   Tablet 10 milliGRAM(s) Oral daily  ARIPiprazole 30 milliGRAM(s) Oral daily  aspirin enteric coated 81 milliGRAM(s) Oral daily  atorvastatin 40 milliGRAM(s) Oral at bedtime  cholecalciferol 5000 Unit(s) Oral daily  dextrose 40% Gel 15 Gram(s) Oral once  dextrose 5%. 1000 milliLiter(s) (50 mL/Hr) IV Continuous <Continuous>  dextrose 5%. 1000 milliLiter(s) (100 mL/Hr) IV Continuous <Continuous>  dextrose 50% Injectable 25 Gram(s) IV Push once  dextrose 50% Injectable 12.5 Gram(s) IV Push once  dextrose 50% Injectable 25 Gram(s) IV Push once  doxazosin 4 milliGRAM(s) Oral <User Schedule>  doxepin 25 milliGRAM(s) Oral at bedtime  enoxaparin Injectable 40 milliGRAM(s) SubCutaneous daily  fenofibrate Tablet 145 milliGRAM(s) Oral daily  glucagon  Injectable 1 milliGRAM(s) IntraMuscular once  hydrALAZINE 100 milliGRAM(s) Oral three times a day  insulin lispro (ADMELOG) corrective regimen sliding scale   SubCutaneous three times a day before meals  insulin lispro (ADMELOG) corrective regimen sliding scale   SubCutaneous at bedtime  labetalol 100 milliGRAM(s) Oral two times a day  lamoTRIgine 100 milliGRAM(s) Oral daily  mirtazapine 30 milliGRAM(s) Oral at bedtime  oxybutynin 5 milliGRAM(s) Oral three times a day  pantoprazole    Tablet 40 milliGRAM(s) Oral before breakfast  venlafaxine XR. 150 milliGRAM(s) Oral daily    MEDICATIONS  (PRN):  acetaminophen   Tablet .. 650 milliGRAM(s) Oral every 6 hours PRN Temp greater or equal to 38.5C (101.3F), Mild Pain (1 - 3)  melatonin 3 milliGRAM(s) Oral at bedtime PRN Insomnia    Allergies  No Known Allergies    Vital Signs Last 24 Hrs  T(C): 36.6 (21 Jul 2021 05:11), Max: 36.6 (21 Jul 2021 05:11)  T(F): 97.8 (21 Jul 2021 05:11), Max: 97.8 (21 Jul 2021 05:11)  HR: 64 (21 Jul 2021 05:11) (64 - 69)  BP: 162/82 (21 Jul 2021 05:11) (162/82 - 200/98)  RR: 18 (21 Jul 2021 05:11) (18 - 18)  SpO2: 94% (21 Jul 2021 05:11) (93% - 94%)  I&O's Summary    20 Jul 2021 07:01  -  21 Jul 2021 07:00  --------------------------------------------------------  IN: 520 mL / OUT: 0 mL / NET: 520 mL    TELE: SR 70's  PHYSICAL EXAM:  Appearance: NAD, no distress, alert,  HEENT: Moist Mucous Membranes, Anicteric  Cardiovascular: Regular rate and rhythm, Normal S1 S2, No JVD, No murmurs, No rubs, gallops or clicks  Respiratory: Non-labored, Clear to auscultation, diminished at bases, No rales, No rhonchi, No wheezing.   Gastrointestinal:  Soft, Non-tender, + BS  Neurologic: Non-focal  Skin: Warm and dry, No visible rashes or ulcers, No ecchymosis, No cyanosis  Musculoskeletal: No clubbing, No cyanosis, No joint swelling/tenderness  Psychiatry: Mood & affect appropriate  Lymph: No peripheral edema.     LABS: All Labs Reviewed:                        10.3   7.52  )-----------( 226      ( 21 Jul 2021 07:35 )             33.1                         9.5    8.37  )-----------( 266      ( 20 Jul 2021 06:55 )             31.3                         9.9    7.60  )-----------( 264      ( 19 Jul 2021 13:35 )             32.6     21 Jul 2021 07:35    139    |  108    |  24     ----------------------------<  105    3.7     |  23     |  1.60   20 Jul 2021 06:55    143    |  110    |  29     ----------------------------<  116    3.6     |  26     |  1.60   19 Jul 2021 13:35    142    |  110    |  27     ----------------------------<  111    3.7     |  25     |  1.60     Ca    9.3        21 Jul 2021 07:35  Ca    8.6        20 Jul 2021 06:55  Ca    8.5        19 Jul 2021 13:35  Phos  3.5       20 Jul 2021 06:55  Mg     2.3       20 Jul 2021 06:55    TPro  6.4    /  Alb  3.2    /  TBili  0.3    /  DBili  x      /  AST  19     /  ALT  24     /  AlkPhos  31     20 Jul 2021 06:55  TPro  7.0    /  Alb  3.5    /  TBili  0.2    /  DBili  x      /  AST  24     /  ALT  26     /  AlkPhos  35     19 Jul 2021 13:35    Creatine Kinase, Serum: 105 U/L (07-20-21 @ 14:35)  Troponin I, Serum: .074 ng/mL (07-20-21 @ 14:35)  Creatine Kinase, Serum: 101 U/L (07-20-21 @ 06:55)    D-Dimer Assay, Quantitative: 486 ng/mL DDU (07-19-21 @ 13:37)    12 Lead ECG:   Ventricular Rate 73 BPM    Atrial Rate 73 BPM    P-R Interval 256 ms    QRS Duration 114 ms    Q-T Interval 428 ms    QTC Calculation(Bazett) 471 ms    R Axis -14 degrees    T Axis 33 degrees    Diagnosis Line Sinus rhythm with 1st degree AV block  Nonspecific T wave abnormality  Abnormal ECG    Confirmed by lynne Monterroso ([id number]) on 7/19/2021 4:09:03 PM (07-19-21 @ 14:18)    < from: TTE Echo Complete w/o Contrast w/ Doppler (11.30.20 @ 11:33) >   EXAM:  ECHO TTE WO CON COMP W DOPP    PROCEDURE DATE:  11/30/2020    INTERPRETATION:  INDICATION: Chest pain  Sonographer AS  Blood Pressure 183/75    Height 172.7 cm     Weight 99.8 kg       BSA 2.13 sq m  Dimensions:  LA 4.0       Normal Values: 2.0 - 4.0 cm  Ao 3.6        Normal Values: 2.0 - 3.8 cm  SEPTUM 1.4       Normal Values: 0.6 - 1.2 cm  PWT 1.2       Normal Values: 0.6 - 1.1 cm  LVIDd 5.8         Normal Values: 3.0 - 5.6 cm  LVIDs 3.6         Normal Values: 1.8 - 4.0 cm  OBSERVATIONS:  Technically difficult study  Mitral Valve: Thickened leaflets with mild MR.  Aortic Valve/Aorta: Calcified trileaflet aortic valve with decreased opening. Peak transaortic valve gradient is 18.4 mmHg with a mean transaortic valvegradient of 9.9 mmHg. The aortic valve area is calculated to be 1.87 sq cm by continuity equation. This consistent with mild aortic stenosis.  Tricuspid Valve: normal with trace TR.  Pulmonic Valve: Not well-visualized  Left Atrium: Dilated  Right Atrium: Not well-visualized  Left Ventricle: normal LV size and systolic function, estimated LVEF of 65%. Mild LVH  Right Ventricle: Grossly normal size and systolic function.  Pericardium/Pleura: normal, no significant pericardial effusion.  LV diastolic dysfunction is present    Conclusion:  Technically difficult study  Mild concentric LVH with normal internal dimensions and systolic function, estimated LVEF of 65%.  Grossly normal RV size and systolic function.  Left atrial enlargement  Calcified trileaflet aortic valve with mild aortic stenosis  Mild MR  Trace TR.  No significant pericardial effusion.    < end of copied text >    < from: CT Angio Chest PE Protocol w/ IV Cont (07.19.21 @ 15:31) >  EXAM:  CT ANGIO CHEST PULM ART CANDIDA                        PROCEDURE DATE:  07/19/2021    INTERPRETATION:  CLINICAL INFORMATION: Hypertension and shortness of breath  COMPARISON: None.  CONTRAST/COMPLICATIONS:  IV Contrast: [id number]  90 cc administered   10 cc discarded  Oral Contrast: NONE  Complications: None reported at time of study completion  PROCEDURE:  CT Angiography of the Chest.  Sagittal and coronal reformats were performed as well as 3D (MIP) reconstructions.  FINDINGS:  LUNGS AND AIRWAYS: Patent central airways.  Dependent and basilar atelectasis. Otherwise clear lungs.  PLEURA: No pleural effusion.  MEDIASTINUM AND SANDIP: No lymphadenopathy.  VESSELS: Atherosclerotic calcifications of thoracic aorta and coronary arteries. Multiple images are degraded by respiratory motion. Excluding regions with motion artifact, there are no pulmonary arterial filling defects identified.  HEART: Cardiomegaly. Aortic valve calcifications. No pericardial effusion. Right pericardial cyst.  CHEST WALL AND LOWER NECK: Within normal limits.  VISUALIZED UPPER ABDOMEN: Probable right renal cysts.  BONES: Degenerative changes. Chronic left rib deformities.  IMPRESSION:  Negative for pulmonary embolism.  --- End of Report ---  < end of copied text >    - < from: US Duplex Venous Lower Ext Complete, Bilateral (07.20.21 @ 01:52) >  IMPRESSION:  No evidence of deep venous thrombosis in either lower extremity.    Right popliteal fossa Baker's cyst.    < end of copied text >

## 2021-07-21 NOTE — PROGRESS NOTE ADULT - SUBJECTIVE AND OBJECTIVE BOX
Patient is a 76y old  Male who presents with a chief complaint of dyspnea, uncontrolled HTN, elevated troponin (21 Jul 2021 13:09)      FROM ADMISSION H+P:   HPI:  77 y/o M with PMHx of Type 2 DM (not on insulin), CAD s/p stents >4 years ago (off plavix), Lupus, HTN, HLD, CKD 3, HepC, diverticulosis, 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 SOB began 2 days while patient was lying in bed. States that he felt like he was having difficulty catching his breath and has been constant since onset. SOB not worse with exertion and has no other aggravating or alleviating factors. Has been having associated sharp jabbing pain in the lower rib areas. Pain is mild, non-radiating, gradual in onset, has no aggravating/alleviating factors, and resolves spontaneously in 30 min. Had one episode of diffuse, cramping abdominal pain yesterday evening and nausea that resolved spontaneously. Also, endorses calf soreness but states that it is not really calf pain. Of note, pt has history of HTN, does not follow with cardiologist. States that he checks his BP daily and for the past two months it has been severely elevated with SBP in the 200s. Denies having any fever, chills, cough, cold-like symptoms, headache, visual changes, chest pain, diarrhea, or dysuria. Has no sick contacts.     In the ED,  Vital Signs T(F) Max: 99.1 BP initially 186/104 (173//93) HR: 66 RR: 18 SpO2: 98%  Labs significant for: H/H 9.9/32.6, D-dimer 486, BUN/Cr 27/1.60, trop 0.068, proBNP 1995  CXR: no acute infiltrate on personal read  CT Angio Chest with IV contrast: Patent central airways. Dependent and basilar atelectasis. Otherwise clear lungs. Negative for pulmonary embolism.  EKG: SR with 1st degree AV block at 73bpm, nonspecific twave abn (19 Jul 2021 22:10)      ----  INTERVAL HPI/OVERNIGHT EVENTS: Pt seen and evaluated at the bedside. Overnight patient /90 and pt was asymptomatic; pt labetalol was increased to 100 BID and hydralazine dose was given earlier and pt bp decreased to 162/82. In morning patient did not have acute complaints. Pt bp joseph to 172/76, so pt labetalol was increased to 100mg TID and losartan was added. Pt denies any headache, chest pain, SOB, nausea, vomiting, constipation, diarrhea.    ----  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  Had surgery ( April 2013 )    S/P tonsillectomy    S/P arthroscopic knee surgery  Bilateral ( 2005 )    Torsion of testicle  Had surgery at age 14    Pilonidal cyst  Had surgery ( 1969 )    S/P cataract surgery  Bilateral    H/O hernia repair        FAMILY HISTORY:  FHx: throat cancer    No family history of colorectal cancer        Home Medications:  amLODIPine 10 mg oral tablet: 1 tab(s) orally once a day (20 Jul 2021 00:24)  ARIPiprazole 30 mg oral tablet: 1 tab(s) orally once a day (20 Jul 2021 00:24)  ARISTADA     INJ 1064MG: INJECT 3.9ML INTRAMUSCULARLY AS DIRECTED (20 Jul 2021 00:24)  DOXAZOSIN 4MG TAB: TAKE 1 TABLET TWICE A DAY (FOR PROSTATE) (20 Jul 2021 00:24)  DOXEPIN 25MG CAP: TAKE ONE CAPSULE AT BEDTIME (20 Jul 2021 00:24)  FENOFIBRATE  TAB 145MG: TAKE ONE TABLET DAILY (FOR CHOLESTEROL) (20 Jul 2021 00:24)  hydrALAZINE 100 mg oral tablet: 1 tab(s) orally 3 times a day (20 Jul 2021 00:24)  labetalol 100 mg oral tablet: 1 tab(s) orally once a day (20 Jul 2021 00:24)  LAMOTRIGINE 100MG TA: TAKE ONE TABLET DAILY (20 Jul 2021 00:24)  meloxicam 7.5 mg oral tablet: 1 tab(s) orally 2 times a day (20 Jul 2021 00:24)  metFORMIN 500 mg oral tablet: 1 tab(s) orally once a day (20 Jul 2021 00:24)  MIRTAZAPINE  TAB 30MG: TAKE 1 TABLET AT BEDTIME (20 Jul 2021 00:24)  OMEPRAZOLE 40MG CAP: TAKE 1 CAPSULE DAILY (FOR STOMACH) (20 Jul 2021 00:24)  OXYBUTYNIN ER 15MG TAB: TAKE ONE TABLET DAILY (20 Jul 2021 00:24)  VASCEPA 1GM CAP: 2 cap(s) orally 2 times a day (20 Jul 2021 00:24)  VENLAFAXINE XR 150MG CAPS: TAKE 1 CAPSULE DAILY (20 Jul 2021 00:24)  VITAMIN D 1000UNIT (M) TAB: TAKE 5 TABLETS DAILY (20 Jul 2021 00:24)      Allergies    No Known Allergies    Intolerances        ----  MEDICATIONS  (STANDING):  amLODIPine   Tablet 10 milliGRAM(s) Oral daily  ARIPiprazole 30 milliGRAM(s) Oral daily  aspirin enteric coated 81 milliGRAM(s) Oral daily  atorvastatin 40 milliGRAM(s) Oral at bedtime  cholecalciferol 5000 Unit(s) Oral daily  dextrose 40% Gel 15 Gram(s) Oral once  dextrose 5%. 1000 milliLiter(s) (50 mL/Hr) IV Continuous <Continuous>  dextrose 5%. 1000 milliLiter(s) (100 mL/Hr) IV Continuous <Continuous>  dextrose 50% Injectable 25 Gram(s) IV Push once  dextrose 50% Injectable 12.5 Gram(s) IV Push once  dextrose 50% Injectable 25 Gram(s) IV Push once  doxazosin 4 milliGRAM(s) Oral <User Schedule>  doxepin 25 milliGRAM(s) Oral at bedtime  enoxaparin Injectable 40 milliGRAM(s) SubCutaneous daily  fenofibrate Tablet 145 milliGRAM(s) Oral daily  glucagon  Injectable 1 milliGRAM(s) IntraMuscular once  hydrALAZINE 100 milliGRAM(s) Oral three times a day  insulin lispro (ADMELOG) corrective regimen sliding scale   SubCutaneous three times a day before meals  insulin lispro (ADMELOG) corrective regimen sliding scale   SubCutaneous at bedtime  labetalol 200 milliGRAM(s) Oral three times a day  lamoTRIgine 100 milliGRAM(s) Oral daily  losartan 25 milliGRAM(s) Oral daily  mirtazapine 30 milliGRAM(s) Oral at bedtime  oxybutynin 5 milliGRAM(s) Oral three times a day  pantoprazole    Tablet 40 milliGRAM(s) Oral before breakfast  venlafaxine XR. 150 milliGRAM(s) Oral daily    MEDICATIONS  (PRN):  acetaminophen   Tablet .. 650 milliGRAM(s) Oral every 6 hours PRN Temp greater or equal to 38.5C (101.3F), Mild Pain (1 - 3)  melatonin 3 milliGRAM(s) Oral at bedtime PRN Insomnia      ----  REVIEW OF SYSTEMS:  Constitutional: denies fever, chills  HEENT: denies headache, dizziness, or lightheadedness  Respiratory: denies SOB, cough, or wheezing  Cardiovascular: denies CP, palpitations  Gastrointestinal: denies nausea, vomiting, diarrhea, constipation, or bloody stools  Genitourinary: denies painful urination, increased frequency, urgency, or bloody urine  Skin/Breast: denies rashes or itching  Musculoskeletal: denies muscle aches, joint swelling, or muscle weakness  Neurologic: denies loss of sensation, numbness, or tingling    ----  PHYSICAL EXAM:  Gen: well appearing, NAD  HEENT: NCAT, PEERLA b/l, EOMI b/l, no conjunctival erythema  Cardio: regular rate and rhythm, +s1s2, no murmurs, rubs, or gallops  Pulm: CTA b/l, no wheezes, rales or rhonchi  Abdomen: soft, nontender, nondistended, +BS x4 quadrants, no guarding  Extremities: no clubbing, cyanosis or edema, +2 pedal pulses  Neuro: AAOx3, CNII-XII intact grossly, 5/5 strength all extremities, sensation intact  Skin: warm and dry      T(C): 36.4 (07-21-21 @ 11:29), Max: 36.6 (07-21-21 @ 05:11)  HR: 71 (07-21-21 @ 13:05) (64 - 71)  BP: 180/70 (07-21-21 @ 12:28) (162/82 - 200/98)  RR: 19 (07-21-21 @ 11:29) (18 - 19)  SpO2: 96% (07-21-21 @ 11:29) (93% - 96%)  Wt(kg): --    ----  I&O's Summary    20 Jul 2021 07:01  -  21 Jul 2021 07:00  --------------------------------------------------------  IN: 520 mL / OUT: 0 mL / NET: 520 mL    21 Jul 2021 07:01  -  21 Jul 2021 14:08  --------------------------------------------------------  IN: 360 mL / OUT: 0 mL / NET: 360 mL        LABS:                        10.3   7.52  )-----------( 226      ( 21 Jul 2021 07:35 )             33.1     07-21    139  |  108  |  24<H>  ----------------------------<  105<H>  3.7   |  23  |  1.60<H>    Ca    9.3      21 Jul 2021 07:35  Phos  3.5     07-20  Mg     2.3     07-20    TPro  6.4  /  Alb  3.2<L>  /  TBili  0.3  /  DBili  x   /  AST  19  /  ALT  24  /  AlkPhos  31<L>  07-20        CAPILLARY BLOOD GLUCOSE      POCT Blood Glucose.: 120 mg/dL (21 Jul 2021 11:39)  POCT Blood Glucose.: 123 mg/dL (21 Jul 2021 07:47)  POCT Blood Glucose.: 118 mg/dL (20 Jul 2021 21:17)  POCT Blood Glucose.: 108 mg/dL (20 Jul 2021 16:42)                ----  Personally reviewed:  Vital sign trends: [  ] yes    [  ] no     [  ] n/a  Laboratory results: [  ] yes    [  ] no     [  ] n/a  Radiology results: [  ] yes    [  ] no     [  ] n/a  Culture results: [  ] yes    [  ] no     [  ] n/a  Consultant recommendations: [  ] yes    [  ] no     [  ] n/a

## 2021-07-21 NOTE — PROGRESS NOTE ADULT - PROBLEM SELECTOR PLAN 6
-DM Type 2 not on insulin  -hold home metformin  -continue sliding scale  -monitor blood glucose, hypoglycemia protocol  -check A1C in AM- 6.1

## 2021-07-21 NOTE — PROGRESS NOTE ADULT - ASSESSMENT
79 y/o M with PMH of NIDDM2, CAD s/p stents 4 years ago (on plavix), mild AS, Lupus, HTN, HepC, HLD, diverticulosis, Hx of blood clots in brain (s/p surgery 2013) presented to the ED with complaints of elevated BP x 2 months and SOB.     SOB/ Hypertension urgency   - Patient with history HTN and CAD presents with SOB x 2 days  - BNP:  <--1995 No meaningful evidence of volume overload, may be elevated in setting of prolonged HTN  - ECHO 11/30/21 showed normal LV and RV  size and systolic function, estimated LVEF of 65%. Mild LVH Mild AS mild MR  - CTA chest negative for PE  - BP: 162/82 (07-21-21 @ 05:11) (162/82 - 200/98)  - Received Hydralazine 20 mg IV x 1 in ED  - Continue Labetalol 100 mg BID , Amlodipine 10 mg QD, Hydralazine 100 mg Q8h, doxazosin   - Low sodium DASH diet  - Daily weight     Elevated Troponin  - Denies chest pain presently with history CAD s/p stents  - Troponin I: 0.074<--.096, <--.067, <--.068, peaked and downtrending, likely in the setting of hypertensive urgency   - EKG SR HR @ 70 bpm  - f/u TTE   - Continue Plavix 75 mg QD  - Continue statin      - Monitor and replete Lytes. Keep K > 4 and Mg > 2  - All other medical needs as per primary team.  - Other cardiovascular workup will depend on clinical course.  - Will continue to follow.    Avani Link [facility]  Nurse Pracitioner - Cardiology   Spectra #[phone]/ [phone]

## 2021-07-21 NOTE — PROGRESS NOTE ADULT - SUBJECTIVE AND OBJECTIVE BOX
TRIPP ZARATE  76y  Male    Patient is a 76y old  Male who presents with a chief complaint of dyspnea, uncontrolled HTN, elevated troponin (21 Jul 2021 10:09)    awake and alert  feels better    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  Had surgery ( April 2013 )    S/P tonsillectomy    S/P arthroscopic knee surgery  Bilateral ( 2005 )    Torsion of testicle  Had surgery at age 14    Pilonidal cyst  Had surgery ( 1969 )    S/P cataract surgery  Bilateral    H/O hernia repair            PHYSICAL EXAM:    T(C): 36.4 (07-21-21 @ 11:29), Max: 36.6 (07-21-21 @ 05:11)  HR: 71 (07-21-21 @ 13:05) (64 - 71)  BP: 180/70 (07-21-21 @ 12:28) (162/82 - 200/98)  RR: 19 (07-21-21 @ 11:29) (18 - 19)  SpO2: 96% (07-21-21 @ 11:29) (93% - 96%)  Wt(kg): --    I&O's Detail    20 Jul 2021 07:01  -  21 Jul 2021 07:00  --------------------------------------------------------  IN:    Oral Fluid: 520 mL  Total IN: 520 mL    OUT:  Total OUT: 0 mL    Total NET: 520 mL      21 Jul 2021 07:01  -  21 Jul 2021 13:09  --------------------------------------------------------  IN:    Oral Fluid: 360 mL  Total IN: 360 mL    OUT:  Total OUT: 0 mL    Total NET: 360 mL          Respiratory: clear anteriorly, decreased BS at bases  Cardiovascular: S1 S2  Gastrointestinal: soft NT ND +BS  Extremities: edema   Neuro: Awake and alert    MEDICATIONS  (STANDING):  amLODIPine   Tablet 10 milliGRAM(s) Oral daily  ARIPiprazole 30 milliGRAM(s) Oral daily  aspirin enteric coated 81 milliGRAM(s) Oral daily  atorvastatin 40 milliGRAM(s) Oral at bedtime  cholecalciferol 5000 Unit(s) Oral daily  dextrose 40% Gel 15 Gram(s) Oral once  dextrose 5%. 1000 milliLiter(s) (50 mL/Hr) IV Continuous <Continuous>  dextrose 5%. 1000 milliLiter(s) (100 mL/Hr) IV Continuous <Continuous>  dextrose 50% Injectable 25 Gram(s) IV Push once  dextrose 50% Injectable 12.5 Gram(s) IV Push once  dextrose 50% Injectable 25 Gram(s) IV Push once  doxazosin 4 milliGRAM(s) Oral <User Schedule>  doxepin 25 milliGRAM(s) Oral at bedtime  enoxaparin Injectable 40 milliGRAM(s) SubCutaneous daily  fenofibrate Tablet 145 milliGRAM(s) Oral daily  glucagon  Injectable 1 milliGRAM(s) IntraMuscular once  hydrALAZINE 100 milliGRAM(s) Oral three times a day  insulin lispro (ADMELOG) corrective regimen sliding scale   SubCutaneous three times a day before meals  insulin lispro (ADMELOG) corrective regimen sliding scale   SubCutaneous at bedtime  labetalol 200 milliGRAM(s) Oral three times a day  lamoTRIgine 100 milliGRAM(s) Oral daily  mirtazapine 30 milliGRAM(s) Oral at bedtime  oxybutynin 5 milliGRAM(s) Oral three times a day  pantoprazole    Tablet 40 milliGRAM(s) Oral before breakfast  venlafaxine XR. 150 milliGRAM(s) Oral daily    MEDICATIONS  (PRN):  acetaminophen   Tablet .. 650 milliGRAM(s) Oral every 6 hours PRN Temp greater or equal to 38.5C (101.3F), Mild Pain (1 - 3)  melatonin 3 milliGRAM(s) Oral at bedtime PRN Insomnia                            10.3   7.52  )-----------( 226      ( 21 Jul 2021 07:35 )             33.1       07-21    139  |  108  |  24<H>  ----------------------------<  105<H>  3.7   |  23  |  1.60<H>    Ca    9.3      21 Jul 2021 07:35  Phos  3.5     07-20  Mg     2.3     07-20    TPro  6.4  /  Alb  3.2<L>  /  TBili  0.3  /  DBili  x   /  AST  19  /  ALT  24  /  AlkPhos  31<L>  07-20      Creatinine Trend: Creatinine Trend: 1.60<--, 1.60<--, 1.60<--

## 2021-07-22 LAB
ANION GAP SERPL CALC-SCNC: 7 MMOL/L — SIGNIFICANT CHANGE UP (ref 5–17)
BASOPHILS # BLD AUTO: 0.03 K/UL — SIGNIFICANT CHANGE UP (ref 0–0.2)
BASOPHILS NFR BLD AUTO: 0.4 % — SIGNIFICANT CHANGE UP (ref 0–2)
BUN SERPL-MCNC: 38 MG/DL — HIGH (ref 7–23)
CALCIUM SERPL-MCNC: 8.4 MG/DL — LOW (ref 8.5–10.1)
CHLORIDE SERPL-SCNC: 106 MMOL/L — SIGNIFICANT CHANGE UP (ref 96–108)
CO2 SERPL-SCNC: 26 MMOL/L — SIGNIFICANT CHANGE UP (ref 22–31)
CREAT SERPL-MCNC: 1.8 MG/DL — HIGH (ref 0.5–1.3)
EOSINOPHIL # BLD AUTO: 0.24 K/UL — SIGNIFICANT CHANGE UP (ref 0–0.5)
EOSINOPHIL NFR BLD AUTO: 3.2 % — SIGNIFICANT CHANGE UP (ref 0–6)
GLUCOSE SERPL-MCNC: 106 MG/DL — HIGH (ref 70–99)
HCT VFR BLD CALC: 28.6 % — LOW (ref 39–50)
HGB BLD-MCNC: 8.9 G/DL — LOW (ref 13–17)
IMM GRANULOCYTES NFR BLD AUTO: 0.3 % — SIGNIFICANT CHANGE UP (ref 0–1.5)
LYMPHOCYTES # BLD AUTO: 2.19 K/UL — SIGNIFICANT CHANGE UP (ref 1–3.3)
LYMPHOCYTES # BLD AUTO: 29.6 % — SIGNIFICANT CHANGE UP (ref 13–44)
MCHC RBC-ENTMCNC: 25 PG — LOW (ref 27–34)
MCHC RBC-ENTMCNC: 31.1 GM/DL — LOW (ref 32–36)
MCV RBC AUTO: 80.3 FL — SIGNIFICANT CHANGE UP (ref 80–100)
MONOCYTES # BLD AUTO: 0.57 K/UL — SIGNIFICANT CHANGE UP (ref 0–0.9)
MONOCYTES NFR BLD AUTO: 7.7 % — SIGNIFICANT CHANGE UP (ref 2–14)
NEUTROPHILS # BLD AUTO: 4.36 K/UL — SIGNIFICANT CHANGE UP (ref 1.8–7.4)
NEUTROPHILS NFR BLD AUTO: 58.8 % — SIGNIFICANT CHANGE UP (ref 43–77)
NRBC # BLD: 0 /100 WBCS — SIGNIFICANT CHANGE UP (ref 0–0)
PLATELET # BLD AUTO: 252 K/UL — SIGNIFICANT CHANGE UP (ref 150–400)
POTASSIUM SERPL-MCNC: 3.4 MMOL/L — LOW (ref 3.5–5.3)
POTASSIUM SERPL-SCNC: 3.4 MMOL/L — LOW (ref 3.5–5.3)
RBC # BLD: 3.56 M/UL — LOW (ref 4.2–5.8)
RBC # FLD: 16.9 % — HIGH (ref 10.3–14.5)
SODIUM SERPL-SCNC: 139 MMOL/L — SIGNIFICANT CHANGE UP (ref 135–145)
WBC # BLD: 7.41 K/UL — SIGNIFICANT CHANGE UP (ref 3.8–10.5)
WBC # FLD AUTO: 7.41 K/UL — SIGNIFICANT CHANGE UP (ref 3.8–10.5)

## 2021-07-22 PROCEDURE — 99233 SBSQ HOSP IP/OBS HIGH 50: CPT | Mod: GC

## 2021-07-22 PROCEDURE — 99233 SBSQ HOSP IP/OBS HIGH 50: CPT

## 2021-07-22 PROCEDURE — 74176 CT ABD & PELVIS W/O CONTRAST: CPT | Mod: 26

## 2021-07-22 RX ORDER — IRON SUCROSE 20 MG/ML
100 INJECTION, SOLUTION INTRAVENOUS EVERY 24 HOURS
Refills: 0 | Status: COMPLETED | OUTPATIENT
Start: 2021-07-22 | End: 2021-07-24

## 2021-07-22 RX ORDER — POTASSIUM CHLORIDE 20 MEQ
40 PACKET (EA) ORAL ONCE
Refills: 0 | Status: COMPLETED | OUTPATIENT
Start: 2021-07-22 | End: 2021-07-22

## 2021-07-22 RX ORDER — CIPROFLOXACIN LACTATE 400MG/40ML
500 VIAL (ML) INTRAVENOUS EVERY 12 HOURS
Refills: 0 | Status: DISCONTINUED | OUTPATIENT
Start: 2021-07-22 | End: 2021-07-27

## 2021-07-22 RX ORDER — METRONIDAZOLE 500 MG
500 TABLET ORAL EVERY 8 HOURS
Refills: 0 | Status: DISCONTINUED | OUTPATIENT
Start: 2021-07-22 | End: 2021-07-27

## 2021-07-22 RX ORDER — POLYETHYLENE GLYCOL 3350 17 G/17G
17 POWDER, FOR SOLUTION ORAL DAILY
Refills: 0 | Status: DISCONTINUED | OUTPATIENT
Start: 2021-07-22 | End: 2021-07-27

## 2021-07-22 RX ORDER — IRON SUCROSE 20 MG/ML
100 INJECTION, SOLUTION INTRAVENOUS EVERY 24 HOURS
Refills: 0 | Status: DISCONTINUED | OUTPATIENT
Start: 2021-07-22 | End: 2021-07-22

## 2021-07-22 RX ADMIN — Medication 100 MILLIGRAM(S): at 14:06

## 2021-07-22 RX ADMIN — Medication 145 MILLIGRAM(S): at 11:45

## 2021-07-22 RX ADMIN — Medication 5 MILLIGRAM(S): at 14:06

## 2021-07-22 RX ADMIN — Medication 25 MILLIGRAM(S): at 21:15

## 2021-07-22 RX ADMIN — LOSARTAN POTASSIUM 25 MILLIGRAM(S): 100 TABLET, FILM COATED ORAL at 05:41

## 2021-07-22 RX ADMIN — ATORVASTATIN CALCIUM 40 MILLIGRAM(S): 80 TABLET, FILM COATED ORAL at 21:14

## 2021-07-22 RX ADMIN — Medication 81 MILLIGRAM(S): at 11:45

## 2021-07-22 RX ADMIN — Medication 200 MILLIGRAM(S): at 21:15

## 2021-07-22 RX ADMIN — ENOXAPARIN SODIUM 40 MILLIGRAM(S): 100 INJECTION SUBCUTANEOUS at 11:45

## 2021-07-22 RX ADMIN — Medication 5 MILLIGRAM(S): at 05:41

## 2021-07-22 RX ADMIN — Medication 500 MILLIGRAM(S): at 21:14

## 2021-07-22 RX ADMIN — Medication 4 MILLIGRAM(S): at 05:40

## 2021-07-22 RX ADMIN — Medication 100 MILLIGRAM(S): at 21:14

## 2021-07-22 RX ADMIN — Medication 40 MILLIEQUIVALENT(S): at 08:06

## 2021-07-22 RX ADMIN — ARIPIPRAZOLE 30 MILLIGRAM(S): 15 TABLET ORAL at 11:45

## 2021-07-22 RX ADMIN — MIRTAZAPINE 30 MILLIGRAM(S): 45 TABLET, ORALLY DISINTEGRATING ORAL at 21:14

## 2021-07-22 RX ADMIN — Medication 200 MILLIGRAM(S): at 05:41

## 2021-07-22 RX ADMIN — Medication 150 MILLIGRAM(S): at 11:46

## 2021-07-22 RX ADMIN — Medication 3 MILLIGRAM(S): at 21:14

## 2021-07-22 RX ADMIN — IRON SUCROSE 100 MILLIGRAM(S): 20 INJECTION, SOLUTION INTRAVENOUS at 11:45

## 2021-07-22 RX ADMIN — POLYETHYLENE GLYCOL 3350 17 GRAM(S): 17 POWDER, FOR SOLUTION ORAL at 17:01

## 2021-07-22 RX ADMIN — Medication 5000 UNIT(S): at 11:45

## 2021-07-22 RX ADMIN — Medication 200 MILLIGRAM(S): at 14:06

## 2021-07-22 RX ADMIN — LAMOTRIGINE 100 MILLIGRAM(S): 25 TABLET, ORALLY DISINTEGRATING ORAL at 11:46

## 2021-07-22 RX ADMIN — Medication 500 MILLIGRAM(S): at 21:15

## 2021-07-22 RX ADMIN — Medication 100 MILLIGRAM(S): at 05:41

## 2021-07-22 RX ADMIN — Medication 5 MILLIGRAM(S): at 21:15

## 2021-07-22 RX ADMIN — PANTOPRAZOLE SODIUM 40 MILLIGRAM(S): 20 TABLET, DELAYED RELEASE ORAL at 05:41

## 2021-07-22 RX ADMIN — Medication 4 MILLIGRAM(S): at 17:01

## 2021-07-22 RX ADMIN — AMLODIPINE BESYLATE 10 MILLIGRAM(S): 2.5 TABLET ORAL at 05:40

## 2021-07-22 NOTE — PROGRESS NOTE ADULT - SUBJECTIVE AND OBJECTIVE BOX
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 SOB began 2 days while patient was lying in bed. States that he felt like he was having difficulty catching his breath and has been constant since onset. SOB not worse with exertion and has no other aggravating or alleviating factors. Has been having associated sharp jabbing pain in the lower rib areas. Pain is mild, non-radiating, gradual in onset, has no aggravating/alleviating factors, and resolves spontaneously in 30 min. Had one episode of diffuse, cramping abdominal pain yesterday evening and nausea that resolved spontaneously. Also, endorses calf soreness but states that it is not really calf pain. Of note, pt has history of HTN, does not follow with cardiologist. States that he checks his BP daily and for the past two months it has been severely elevated with SBP in the 200s. Denies having any fever, chills, cough, cold-like symptoms, headache, visual changes, chest pain, diarrhea, or dysuria. Has no sick contacts.     In the ED,  Vital Signs T(F) Max: 99.1 BP initially 186/104 (173//93) HR: 66 RR: 18 SpO2: 98%  Labs significant for: H/H 9.9/32.6, D-dimer 486, BUN/Cr 27/1.60, trop 0.068, proBNP 1995  CXR: no acute infiltrate on personal read  CT Angio Chest with IV contrast: Patent central airways. Dependent and basilar atelectasis. Otherwise clear lungs. Negative for pulmonary embolism.  EKG: SR with 1st degree AV block at 73bpm, nonspecific twave abn (19 Jul 2021 22:10)      ----  INTERVAL HPI/OVERNIGHT EVENTS: Pt seen and evaluated at the bedside. No acute overnight events occurred. Pt complains of RLQ and Midline abdominal pain rating a 7/10 severity. He states that pain has been intermittent for the last 2 weeks, does not radiate, and is not alleviated or exacerbated by any factors. Ordered U/S of abdominal pelvis, kidneys, as well as a CT abdomen and pelvis. Pt also reports nausea post effexor dose that resolves spontaneously. Pt also reports mild headache for 5 minutes at 6 am that resolved spontaneously. Pt denies chest pain, palpitations, SOB, cough, nausea, vomiting, diarrhea, constipation.     ----  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  Had surgery ( April 2013 )    S/P tonsillectomy    S/P arthroscopic knee surgery  Bilateral ( 2005 )    Torsion of testicle  Had surgery at age 14    Pilonidal cyst  Had surgery ( 1969 )    S/P cataract surgery  Bilateral    H/O hernia repair        FAMILY HISTORY:  FHx: throat cancer    No family history of colorectal cancer        Home Medications:  amLODIPine 10 mg oral tablet: 1 tab(s) orally once a day (20 Jul 2021 00:24)  ARIPiprazole 30 mg oral tablet: 1 tab(s) orally once a day (20 Jul 2021 00:24)  ARISTADA     INJ 1064MG: INJECT 3.9ML INTRAMUSCULARLY AS DIRECTED (20 Jul 2021 00:24)  DOXAZOSIN 4MG TAB: TAKE 1 TABLET TWICE A DAY (FOR PROSTATE) (20 Jul 2021 00:24)  DOXEPIN 25MG CAP: TAKE ONE CAPSULE AT BEDTIME (20 Jul 2021 00:24)  FENOFIBRATE  TAB 145MG: TAKE ONE TABLET DAILY (FOR CHOLESTEROL) (20 Jul 2021 00:24)  hydrALAZINE 100 mg oral tablet: 1 tab(s) orally 3 times a day (20 Jul 2021 00:24)  labetalol 100 mg oral tablet: 1 tab(s) orally once a day (20 Jul 2021 00:24)  LAMOTRIGINE 100MG TA: TAKE ONE TABLET DAILY (20 Jul 2021 00:24)  meloxicam 7.5 mg oral tablet: 1 tab(s) orally 2 times a day (20 Jul 2021 00:24)  metFORMIN 500 mg oral tablet: 1 tab(s) orally once a day (20 Jul 2021 00:24)  MIRTAZAPINE  TAB 30MG: TAKE 1 TABLET AT BEDTIME (20 Jul 2021 00:24)  OMEPRAZOLE 40MG CAP: TAKE 1 CAPSULE DAILY (FOR STOMACH) (20 Jul 2021 00:24)  OXYBUTYNIN ER 15MG TAB: TAKE ONE TABLET DAILY (20 Jul 2021 00:24)  VASCEPA 1GM CAP: 2 cap(s) orally 2 times a day (20 Jul 2021 00:24)  VENLAFAXINE XR 150MG CAPS: TAKE 1 CAPSULE DAILY (20 Jul 2021 00:24)  VITAMIN D 1000UNIT (M) TAB: TAKE 5 TABLETS DAILY (20 Jul 2021 00:24)      Allergies    No Known Allergies    Intolerances        ----  MEDICATIONS  (STANDING):  amLODIPine   Tablet 10 milliGRAM(s) Oral daily  ARIPiprazole 30 milliGRAM(s) Oral daily  aspirin enteric coated 81 milliGRAM(s) Oral daily  atorvastatin 40 milliGRAM(s) Oral at bedtime  cholecalciferol 5000 Unit(s) Oral daily  dextrose 40% Gel 15 Gram(s) Oral once  dextrose 5%. 1000 milliLiter(s) (50 mL/Hr) IV Continuous <Continuous>  dextrose 5%. 1000 milliLiter(s) (100 mL/Hr) IV Continuous <Continuous>  dextrose 50% Injectable 25 Gram(s) IV Push once  dextrose 50% Injectable 12.5 Gram(s) IV Push once  dextrose 50% Injectable 25 Gram(s) IV Push once  doxazosin 4 milliGRAM(s) Oral <User Schedule>  doxepin 25 milliGRAM(s) Oral at bedtime  enoxaparin Injectable 40 milliGRAM(s) SubCutaneous daily  fenofibrate Tablet 145 milliGRAM(s) Oral daily  glucagon  Injectable 1 milliGRAM(s) IntraMuscular once  hydrALAZINE 100 milliGRAM(s) Oral three times a day  insulin lispro (ADMELOG) corrective regimen sliding scale   SubCutaneous three times a day before meals  insulin lispro (ADMELOG) corrective regimen sliding scale   SubCutaneous at bedtime  iron sucrose Injectable 100 milliGRAM(s) IV Push every 24 hours  labetalol 200 milliGRAM(s) Oral three times a day  lamoTRIgine 100 milliGRAM(s) Oral daily  losartan 25 milliGRAM(s) Oral daily  mirtazapine 30 milliGRAM(s) Oral at bedtime  oxybutynin 5 milliGRAM(s) Oral three times a day  pantoprazole    Tablet 40 milliGRAM(s) Oral before breakfast  venlafaxine XR. 150 milliGRAM(s) Oral daily    MEDICATIONS  (PRN):  acetaminophen   Tablet .. 650 milliGRAM(s) Oral every 6 hours PRN Temp greater or equal to 38.5C (101.3F), Mild Pain (1 - 3)  melatonin 3 milliGRAM(s) Oral at bedtime PRN Insomnia      ----  REVIEW OF SYSTEMS:  Constitutional: denies fever, chills  HEENT: dizziness, or lightheadedness. Admits to mild headache 5 min in duration  Respiratory: denies SOB, cough, or wheezing  Cardiovascular: denies CP, palpitations  Gastrointestinal: denies vomiting, diarrhea, constipation, or bloody stools. Admits to abdominal pain  Genitourinary: denies painful urination, increased frequency, urgency, or bloody urine  Skin/Breast: denies rashes or itching  Musculoskeletal: denies muscle aches, joint swelling, or muscle weakness  Neurologic: denies loss of sensation, numbness, or tingling    ----  PHYSICAL EXAM:  Gen: well appearing, NAD  HEENT: NCAT, PEERLA b/l, EOMI b/l, no conjunctival erythema  Cardio: regular rate and rhythm, +s1s2, no murmurs, rubs, or gallops  Pulm: CTA b/l, no wheezes, rales or rhonchi  Abdomen: soft, nontender, nondistended, +BS x4 quadrants, no guarding  Extremities: no clubbing, cyanosis or edema, +2 pedal pulses  Neuro: AAOx3, CNII-XII intact grossly, 5/5 strength all extremities, sensation intact  Skin: warm and dry      T(C): 36.7 (07-22-21 @ 11:33), Max: 37.1 (07-22-21 @ 10:04)  HR: 63 (07-22-21 @ 11:33) (59 - 66)  BP: 162/83 (07-22-21 @ 11:33) (145/77 - 188/93)  RR: 18 (07-22-21 @ 11:33) (18 - 18)  SpO2: 95% (07-22-21 @ 11:33) (93% - 96%)  Wt(kg): --    ----  I&O's Summary    21 Jul 2021 07:01  -  22 Jul 2021 07:00  --------------------------------------------------------  IN: 560 mL / OUT: 0 mL / NET: 560 mL        LABS:                        8.9    7.41  )-----------( 252      ( 22 Jul 2021 06:24 )             28.6     07-22    139  |  106  |  38<H>  ----------------------------<  106<H>  3.4<L>   |  26  |  1.80<H>    Ca    8.4<L>      22 Jul 2021 06:23          CAPILLARY BLOOD GLUCOSE      POCT Blood Glucose.: 123 mg/dL (22 Jul 2021 11:58)  POCT Blood Glucose.: 124 mg/dL (22 Jul 2021 08:00)  POCT Blood Glucose.: 116 mg/dL (21 Jul 2021 21:05)  POCT Blood Glucose.: 124 mg/dL (21 Jul 2021 16:52)                ----  Personally reviewed:  Vital sign trends: [  ] yes    [  ] no     [  ] n/a  Laboratory results: [  ] yes    [  ] no     [  ] n/a  Radiology results: [  ] yes    [  ] no     [  ] n/a  Culture results: [  ] yes    [  ] no     [  ] n/a  Consultant recommendations: [  ] yes    [  ] no     [  ] n/a

## 2021-07-22 NOTE — PROGRESS NOTE ADULT - ASSESSMENT
76 male with a history of HTN, CAD, CHF, HLD, DM. PVD, SLE, Hepatitis C, depression and CKD now with admitted for accelerated HTN and possible ACS. S/P IV Contrast dye and was on metformin as out patient.   Renal indices are stable at this time.   BP is better controlled now.   will continue the ARB for now.

## 2021-07-22 NOTE — PROGRESS NOTE ADULT - ASSESSMENT
75 y/o M with PMHx of Type 2 DM (not on insulin), CAD s/p stents >4 years ago (off plavix), Lupus, HTN, HLD, CKD stage 3, HepC, diverticulosis, history of blood clots in brain (s/p surgery 2013) presented to the ED complaining of shortness of breath. Admitted for Hypertensive urgency

## 2021-07-22 NOTE — PROGRESS NOTE ADULT - PROBLEM SELECTOR PLAN 1
-BP on arrival was 186/104, s/p IV hydralazine in ED  -continue hydralazine and amlodipine  - Added PO losartan 25 mg and PO labetalol increased to 100 mg TID for SBP in 170s  -to check 2D ECHO  -cycle cardiac enzymes  -cardio Dr. Monterroso following  -pt admits to abdominal pain--> U/S renal, abdominal pelvis and CT abdomen and pelvis  -CT AP--> mild uncomplicated diverticulitis and mild hepatomegaly--> may start abx

## 2021-07-22 NOTE — PROGRESS NOTE ADULT - ASSESSMENT
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     SOB/ Hypertension urgency   - Patient with history HTN and CAD presents with SOB x 2 days  - BNP:  <--1995 No meaningful evidence of volume overload, may be elevated in setting of prolonged HTN  - ECHO 11/30/21 showed normal LV and RV  size and systolic function, estimated LVEF of 65%. Mild LVH Mild AS mild MR  - CTA chest negative for PE  - BP: 162/82 (07-21-21 @ 05:11) (162/82 - 200/98)  - Received Hydralazine 20 mg IV x 1 in ED  - Continue Labetalol 100 mg BID , Amlodipine 10 mg QD, Hydralazine 100 mg Q8h, doxazosin   - Low sodium DASH diet  - Daily weight     Elevated Troponin  - Denies chest pain presently with history CAD s/p stents  - Troponin I: 0.074<--.096, <--.067, <--.068, peaked and downtrending, likely in the setting of hypertensive urgency   - EKG SR HR @ 70 bpm  - f/u TTE   - Continue Plavix 75 mg QD  - Continue statin      - Monitor and replete Lytes. Keep K > 4 and Mg > 2  - All other medical needs as per primary team.  - Other cardiovascular workup will depend on clinical course.  - Will continue to follow.    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     SOB/ Hypertension urgency   - Patient with history HTN and CAD presents with SOB x 2 days  - BNP:  <--1995 No meaningful evidence of volume overload, may be elevated in setting of prolonged HTN  - ECHO 11/30/21 showed normal LV and RV  size and systolic function, estimated LVEF of 65%. Mild LVH Mild AS mild MR  - CTA chest negative for PE  - BP: 174/96 (07-22-21 @ 04:48) (148/81 - 188/93)  - Received Hydralazine 20 mg IV x 1 in ED  - Labetalol increased to 200 mg BID today, if no improvement can increase 300 mg BID tomorrow   - continue with Amlodipine 10 mg QD, Hydralazine 100 mg Q8h, doxazosin, cozaar  - Low sodium DASH diet  - Daily weight     Elevated Troponin  - Denies chest pain presently with history CAD s/p stents  - Troponin I: 0.074<--.096, <--.067, <--.068, peaked, likely in the setting of hypertensive urgency   - EKG SR HR @ 70 bpm  - f/u TTE   - Continue Plavix 75 mg QD  - Continue statin      - Monitor and replete Lytes. Keep K > 4 and Mg > 2  - All other medical needs as per primary team.  - Other cardiovascular workup will depend on clinical course.  - Will continue to follow.    Avani Link, [facility]  Nurse Pracitioner - Cardiology   Spectra #[phone]/ [phone]

## 2021-07-22 NOTE — PROGRESS NOTE ADULT - SUBJECTIVE AND OBJECTIVE BOX
ZARATE TRIPP  76y  Male    Patient is a 76y old  Male who presents with a chief complaint of dyspnea, uncontrolled HTN, elevated troponin (22 Jul 2021 09:37)    out of bed to chair  denies any SOB.    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  Had surgery ( April 2013 )    S/P tonsillectomy    S/P arthroscopic knee surgery  Bilateral ( 2005 )    Torsion of testicle  Had surgery at age 14    Pilonidal cyst  Had surgery ( 1969 )    S/P cataract surgery  Bilateral    H/O hernia repair            PHYSICAL EXAM:    T(C): 36.7 (07-22-21 @ 11:33), Max: 37.1 (07-22-21 @ 10:04)  HR: 63 (07-22-21 @ 11:33) (59 - 71)  BP: 162/83 (07-22-21 @ 11:33) (145/77 - 188/93)  RR: 18 (07-22-21 @ 11:33) (18 - 18)  SpO2: 95% (07-22-21 @ 11:33) (93% - 96%)  Wt(kg): --    I&O's Detail    21 Jul 2021 07:01  -  22 Jul 2021 07:00  --------------------------------------------------------  IN:    Oral Fluid: 560 mL  Total IN: 560 mL    OUT:  Total OUT: 0 mL    Total NET: 560 mL          Respiratory: clear anteriorly, decreased BS at bases  Cardiovascular: S1 S2  Gastrointestinal: soft NT ND +BS  Extremities: edema   Neuro: Awake and alert    MEDICATIONS  (STANDING):  amLODIPine   Tablet 10 milliGRAM(s) Oral daily  ARIPiprazole 30 milliGRAM(s) Oral daily  aspirin enteric coated 81 milliGRAM(s) Oral daily  atorvastatin 40 milliGRAM(s) Oral at bedtime  cholecalciferol 5000 Unit(s) Oral daily  dextrose 40% Gel 15 Gram(s) Oral once  dextrose 5%. 1000 milliLiter(s) (50 mL/Hr) IV Continuous <Continuous>  dextrose 5%. 1000 milliLiter(s) (100 mL/Hr) IV Continuous <Continuous>  dextrose 50% Injectable 25 Gram(s) IV Push once  dextrose 50% Injectable 12.5 Gram(s) IV Push once  dextrose 50% Injectable 25 Gram(s) IV Push once  doxazosin 4 milliGRAM(s) Oral <User Schedule>  doxepin 25 milliGRAM(s) Oral at bedtime  enoxaparin Injectable 40 milliGRAM(s) SubCutaneous daily  fenofibrate Tablet 145 milliGRAM(s) Oral daily  glucagon  Injectable 1 milliGRAM(s) IntraMuscular once  hydrALAZINE 100 milliGRAM(s) Oral three times a day  insulin lispro (ADMELOG) corrective regimen sliding scale   SubCutaneous three times a day before meals  insulin lispro (ADMELOG) corrective regimen sliding scale   SubCutaneous at bedtime  iron sucrose Injectable 100 milliGRAM(s) IV Push every 24 hours  labetalol 200 milliGRAM(s) Oral three times a day  lamoTRIgine 100 milliGRAM(s) Oral daily  losartan 25 milliGRAM(s) Oral daily  mirtazapine 30 milliGRAM(s) Oral at bedtime  oxybutynin 5 milliGRAM(s) Oral three times a day  pantoprazole    Tablet 40 milliGRAM(s) Oral before breakfast  venlafaxine XR. 150 milliGRAM(s) Oral daily    MEDICATIONS  (PRN):  acetaminophen   Tablet .. 650 milliGRAM(s) Oral every 6 hours PRN Temp greater or equal to 38.5C (101.3F), Mild Pain (1 - 3)  melatonin 3 milliGRAM(s) Oral at bedtime PRN Insomnia                            8.9    7.41  )-----------( 252      ( 22 Jul 2021 06:24 )             28.6       07-22    139  |  106  |  38<H>  ----------------------------<  106<H>  3.4<L>   |  26  |  1.80<H>    Ca    8.4<L>      22 Jul 2021 06:23        Creatinine Trend: Creatinine Trend: 1.80<--, 1.60<--, 1.60<--, 1.60<--

## 2021-07-22 NOTE — PROGRESS NOTE ADULT - SUBJECTIVE AND OBJECTIVE BOX
[facility] -- Fifi Bliss Grossman, [place], Abraham Sheridan Savella, [place]: Office # [phone]    Follow Up:  BP control on oral antihypertensives    Subjective/Observations: Patient seen and examined. Patient awake, alert, sitting up in bed eating breakfast. No complaints of chest pain, dyspnea, palpitations or dizziness. No orthopnea and PND. Tolerating room air.     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 1000 milliLiter(s) (50 mL/Hr) IV Continuous <Continuous>  dextrose 5%. 1000 milliLiter(s) (100 mL/Hr) IV Continuous <Continuous>  dextrose 50% Injectable 25 Gram(s) IV Push once  dextrose 50% Injectable 12.5 Gram(s) IV Push once  dextrose 50% Injectable 25 Gram(s) IV Push once  doxazosin 4 milliGRAM(s) Oral <User Schedule>  doxepin 25 milliGRAM(s) Oral at bedtime  enoxaparin Injectable 40 milliGRAM(s) SubCutaneous daily  fenofibrate Tablet 145 milliGRAM(s) Oral daily  glucagon  Injectable 1 milliGRAM(s) IntraMuscular once  hydrALAZINE 100 milliGRAM(s) Oral three times a day  insulin lispro (ADMELOG) corrective regimen sliding scale   SubCutaneous three times a day before meals  insulin lispro (ADMELOG) corrective regimen sliding scale   SubCutaneous at bedtime  labetalol 200 milliGRAM(s) Oral three times a day  lamoTRIgine 100 milliGRAM(s) Oral daily  losartan 25 milliGRAM(s) Oral daily  mirtazapine 30 milliGRAM(s) Oral at bedtime  oxybutynin 5 milliGRAM(s) Oral three times a day  pantoprazole    Tablet 40 milliGRAM(s) Oral before breakfast  venlafaxine XR. 150 milliGRAM(s) Oral daily    MEDICATIONS  (PRN):  acetaminophen   Tablet .. 650 milliGRAM(s) Oral every 6 hours PRN Temp greater or equal to 38.5C (101.3F), Mild Pain (1 - 3)  melatonin 3 milliGRAM(s) Oral at bedtime PRN Insomnia    Allergies  No Known Allergies    Vital Signs Last 24 Hrs  T(C): 36.4 (22 Jul 2021 04:48), Max: 36.7 (21 Jul 2021 16:58)  T(F): 97.5 (22 Jul 2021 04:48), Max: 98.1 (21 Jul 2021 16:58)  HR: 61 (22 Jul 2021 04:48) (59 - 71)  BP: 174/96 (22 Jul 2021 04:48) (148/81 - 188/93)  RR: 18 (22 Jul 2021 04:48) (18 - 19)  SpO2: 95% (22 Jul 2021 04:48) (93% - 96%)  I&O's Summary    21 Jul 2021 07:01  -  22 Jul 2021 07:00  --------------------------------------------------------  IN: 560 mL / OUT: 0 mL / NET: 560 mL    TELE:  1st degree 60's     PHYSICAL EXAM:  Appearance: NAD, no distress, alert, pleasant   HEENT: Moist Mucous Membranes, Anicteric  Cardiovascular: Regular rate and rhythm, Normal S1 S2, No JVD, + murmurs, No rubs, gallops or clicks  Respiratory: Non-labored, Clear to auscultation, No rales, No rhonchi, No wheezing.   Gastrointestinal:  Soft, Non-tender, + BS  Neurologic: Non-focal  Skin: Warm and dry, No visible rashes or ulcers, No ecchymosis, No cyanosis  Musculoskeletal: No clubbing, No cyanosis, No joint swelling/tenderness  Psychiatry: Mood & affect appropriate  Lymph: No peripheral edema.     LABS: All Labs Reviewed:                        8.9    7.41  )-----------( 252      ( 22 Jul 2021 06:24 )             28.6                         10.3   7.52  )-----------( 226      ( 21 Jul 2021 07:35 )             33.1                         9.5    8.37  )-----------( 266      ( 20 Jul 2021 06:55 )             31.3     22 Jul 2021 06:23    139    |  106    |  38     ----------------------------<  106    3.4     |  26     |  1.80   21 Jul 2021 07:35    139    |  108    |  24     ----------------------------<  105    3.7     |  23     |  1.60   20 Jul 2021 06:55    143    |  110    |  29     ----------------------------<  116    3.6     |  26     |  1.60     Ca    8.4        22 Jul 2021 06:23  Ca    9.3        21 Jul 2021 07:35  Ca    8.6        20 Jul 2021 06:55  Phos  3.5       20 Jul 2021 06:55  Mg     2.3       20 Jul 2021 06:55    TPro  6.4    /  Alb  3.2    /  TBili  0.3    /  DBili  x      /  AST  19     /  ALT  24     /  AlkPhos  31     20 Jul 2021 06:55  TPro  7.0    /  Alb  3.5    /  TBili  0.2    /  DBili  x      /  AST  24     /  ALT  26     /  AlkPhos  35     19 Jul 2021 13:35    Creatine Kinase, Serum: 105 U/L (07-20-21 @ 14:35)  Troponin I, Serum: .074 ng/mL (07-20-21 @ 14:35)  Creatine Kinase, Serum: 101 U/L (07-20-21 @ 06:55)    D-Dimer Assay, Quantitative: 486 ng/mL DDU (07-19-21 @ 13:37)    12 Lead ECG:   Ventricular Rate 73 BPM  Atrial Rate 73 BPM  P-R Interval 256 ms  QRS Duration 114 ms  Q-T Interval 428 ms  QTC Calculation(Bazett) 471 ms  R Axis -14 degrees  T Axis 33 degrees  Diagnosis Line Sinus rhythm with 1st degree AV block  Nonspecific T wave abnormality  Abnormal ECG    Confirmed by lynne Monterroso ([id number]) on 7/19/2021 4:09:03 PM (07-19-21 @ 14:18)    < from: US Duplex Venous Lower Ext Complete, Bilateral (07.20.21 @ 01:52) >  MPRESSION:  No evidence of deep venous thrombosis in either lower extremity.    Right popliteal fossa Baker's cyst.    < from: TTE Echo Complete w/o Contrast w/ Doppler (07.20.21 @ 08:06) >    IMPRESSION:  Normal left ventricular systolic function, estimated LVEF of 60%.  Grossly normal RV size and systolic function.  Left atrial enlargement  Calcified trileaflet aortic valve with mild aortic stenosis, without AI.  Mild MR  Trace TR.  No significant pericardial effusion.

## 2021-07-23 DIAGNOSIS — K57.92 DIVERTICULITIS OF INTESTINE, PART UNSPECIFIED, WITHOUT PERFORATION OR ABSCESS WITHOUT BLEEDING: ICD-10-CM

## 2021-07-23 LAB
ANION GAP SERPL CALC-SCNC: 8 MMOL/L — SIGNIFICANT CHANGE UP (ref 5–17)
BASOPHILS # BLD AUTO: 0.04 K/UL — SIGNIFICANT CHANGE UP (ref 0–0.2)
BASOPHILS NFR BLD AUTO: 0.6 % — SIGNIFICANT CHANGE UP (ref 0–2)
BUN SERPL-MCNC: 41 MG/DL — HIGH (ref 7–23)
CALCIUM SERPL-MCNC: 8.6 MG/DL — SIGNIFICANT CHANGE UP (ref 8.5–10.1)
CHLORIDE SERPL-SCNC: 108 MMOL/L — SIGNIFICANT CHANGE UP (ref 96–108)
CO2 SERPL-SCNC: 25 MMOL/L — SIGNIFICANT CHANGE UP (ref 22–31)
CREAT SERPL-MCNC: 2.1 MG/DL — HIGH (ref 0.5–1.3)
EOSINOPHIL # BLD AUTO: 0.25 K/UL — SIGNIFICANT CHANGE UP (ref 0–0.5)
EOSINOPHIL NFR BLD AUTO: 3.7 % — SIGNIFICANT CHANGE UP (ref 0–6)
GLUCOSE SERPL-MCNC: 90 MG/DL — SIGNIFICANT CHANGE UP (ref 70–99)
HCT VFR BLD CALC: 30 % — LOW (ref 39–50)
HGB BLD-MCNC: 9.2 G/DL — LOW (ref 13–17)
IMM GRANULOCYTES NFR BLD AUTO: 0.1 % — SIGNIFICANT CHANGE UP (ref 0–1.5)
LYMPHOCYTES # BLD AUTO: 1.95 K/UL — SIGNIFICANT CHANGE UP (ref 1–3.3)
LYMPHOCYTES # BLD AUTO: 28.5 % — SIGNIFICANT CHANGE UP (ref 13–44)
MCHC RBC-ENTMCNC: 24.9 PG — LOW (ref 27–34)
MCHC RBC-ENTMCNC: 30.7 GM/DL — LOW (ref 32–36)
MCV RBC AUTO: 81.1 FL — SIGNIFICANT CHANGE UP (ref 80–100)
MONOCYTES # BLD AUTO: 0.53 K/UL — SIGNIFICANT CHANGE UP (ref 0–0.9)
MONOCYTES NFR BLD AUTO: 7.7 % — SIGNIFICANT CHANGE UP (ref 2–14)
NEUTROPHILS # BLD AUTO: 4.06 K/UL — SIGNIFICANT CHANGE UP (ref 1.8–7.4)
NEUTROPHILS NFR BLD AUTO: 59.4 % — SIGNIFICANT CHANGE UP (ref 43–77)
NRBC # BLD: 0 /100 WBCS — SIGNIFICANT CHANGE UP (ref 0–0)
PLATELET # BLD AUTO: 258 K/UL — SIGNIFICANT CHANGE UP (ref 150–400)
POTASSIUM SERPL-MCNC: 3.8 MMOL/L — SIGNIFICANT CHANGE UP (ref 3.5–5.3)
POTASSIUM SERPL-SCNC: 3.8 MMOL/L — SIGNIFICANT CHANGE UP (ref 3.5–5.3)
RBC # BLD: 3.7 M/UL — LOW (ref 4.2–5.8)
RBC # FLD: 17 % — HIGH (ref 10.3–14.5)
SODIUM SERPL-SCNC: 141 MMOL/L — SIGNIFICANT CHANGE UP (ref 135–145)
WBC # BLD: 6.84 K/UL — SIGNIFICANT CHANGE UP (ref 3.8–10.5)
WBC # FLD AUTO: 6.84 K/UL — SIGNIFICANT CHANGE UP (ref 3.8–10.5)

## 2021-07-23 PROCEDURE — 93976 VASCULAR STUDY: CPT | Mod: 26

## 2021-07-23 PROCEDURE — 99232 SBSQ HOSP IP/OBS MODERATE 35: CPT

## 2021-07-23 PROCEDURE — 71045 X-RAY EXAM CHEST 1 VIEW: CPT | Mod: 26

## 2021-07-23 PROCEDURE — 99233 SBSQ HOSP IP/OBS HIGH 50: CPT | Mod: GC

## 2021-07-23 RX ORDER — LABETALOL HCL 100 MG
300 TABLET ORAL THREE TIMES A DAY
Refills: 0 | Status: DISCONTINUED | OUTPATIENT
Start: 2021-07-23 | End: 2021-07-27

## 2021-07-23 RX ORDER — SODIUM CHLORIDE 9 MG/ML
1000 INJECTION INTRAMUSCULAR; INTRAVENOUS; SUBCUTANEOUS
Refills: 0 | Status: DISCONTINUED | OUTPATIENT
Start: 2021-07-23 | End: 2021-07-23

## 2021-07-23 RX ADMIN — ARIPIPRAZOLE 30 MILLIGRAM(S): 15 TABLET ORAL at 12:35

## 2021-07-23 RX ADMIN — Medication 81 MILLIGRAM(S): at 12:34

## 2021-07-23 RX ADMIN — Medication 500 MILLIGRAM(S): at 14:46

## 2021-07-23 RX ADMIN — Medication 25 MILLIGRAM(S): at 21:43

## 2021-07-23 RX ADMIN — Medication 500 MILLIGRAM(S): at 21:42

## 2021-07-23 RX ADMIN — LAMOTRIGINE 100 MILLIGRAM(S): 25 TABLET, ORALLY DISINTEGRATING ORAL at 12:34

## 2021-07-23 RX ADMIN — Medication 200 MILLIGRAM(S): at 14:46

## 2021-07-23 RX ADMIN — MIRTAZAPINE 30 MILLIGRAM(S): 45 TABLET, ORALLY DISINTEGRATING ORAL at 21:42

## 2021-07-23 RX ADMIN — Medication 5 MILLIGRAM(S): at 05:16

## 2021-07-23 RX ADMIN — Medication 150 MILLIGRAM(S): at 12:34

## 2021-07-23 RX ADMIN — Medication 5000 UNIT(S): at 12:35

## 2021-07-23 RX ADMIN — Medication 4 MILLIGRAM(S): at 17:41

## 2021-07-23 RX ADMIN — Medication 500 MILLIGRAM(S): at 05:17

## 2021-07-23 RX ADMIN — Medication 100 MILLIGRAM(S): at 14:59

## 2021-07-23 RX ADMIN — ENOXAPARIN SODIUM 40 MILLIGRAM(S): 100 INJECTION SUBCUTANEOUS at 12:35

## 2021-07-23 RX ADMIN — PANTOPRAZOLE SODIUM 40 MILLIGRAM(S): 20 TABLET, DELAYED RELEASE ORAL at 05:17

## 2021-07-23 RX ADMIN — LOSARTAN POTASSIUM 25 MILLIGRAM(S): 100 TABLET, FILM COATED ORAL at 05:16

## 2021-07-23 RX ADMIN — Medication 100 MILLIGRAM(S): at 21:42

## 2021-07-23 RX ADMIN — Medication 200 MILLIGRAM(S): at 05:17

## 2021-07-23 RX ADMIN — Medication 1: at 12:08

## 2021-07-23 RX ADMIN — Medication 5 MILLIGRAM(S): at 21:43

## 2021-07-23 RX ADMIN — Medication 100 MILLIGRAM(S): at 05:16

## 2021-07-23 RX ADMIN — IRON SUCROSE 100 MILLIGRAM(S): 20 INJECTION, SOLUTION INTRAVENOUS at 12:35

## 2021-07-23 RX ADMIN — AMLODIPINE BESYLATE 10 MILLIGRAM(S): 2.5 TABLET ORAL at 05:17

## 2021-07-23 RX ADMIN — Medication 500 MILLIGRAM(S): at 17:41

## 2021-07-23 RX ADMIN — Medication 145 MILLIGRAM(S): at 12:34

## 2021-07-23 RX ADMIN — Medication 4 MILLIGRAM(S): at 05:16

## 2021-07-23 RX ADMIN — Medication 5 MILLIGRAM(S): at 14:46

## 2021-07-23 RX ADMIN — ATORVASTATIN CALCIUM 40 MILLIGRAM(S): 80 TABLET, FILM COATED ORAL at 21:42

## 2021-07-23 RX ADMIN — SODIUM CHLORIDE 50 MILLILITER(S): 9 INJECTION INTRAMUSCULAR; INTRAVENOUS; SUBCUTANEOUS at 10:27

## 2021-07-23 RX ADMIN — Medication 300 MILLIGRAM(S): at 21:42

## 2021-07-23 RX ADMIN — Medication 650 MILLIGRAM(S): at 12:32

## 2021-07-23 RX ADMIN — Medication 650 MILLIGRAM(S): at 13:02

## 2021-07-23 NOTE — PROGRESS NOTE ADULT - PROBLEM SELECTOR PLAN 5
-stable, was admitted in the past for GI bleed  -no evidence of bleeding at present  -iron studies consistent with iron deficiency anemia - will check FOBT  -ferritin of 11 - will consider venofer  -vit b12 and folate WNL  -monitor H/H  -will continue with lovenox for now -with insomnia  -continue home lamictal, buspar, effexor, remeron and doxepin

## 2021-07-23 NOTE — PROGRESS NOTE ADULT - ASSESSMENT
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     SOB/ Hypertension urgency   - Patient with history HTN and CAD presents with SOB x 2 days  - BNP:  <--1995 No meaningful evidence of volume overload, may be elevated in setting of prolonged HTN  - ECHO 11/30/21 showed normal LV and RV  size and systolic function, estimated LVEF of 65%. Mild LVH Mild AS mild MR  - CTA chest negative for PE  - BP: 168./88   - Received Hydralazine 20 mg IV x 1 in ED  - Labetalol increased yesterday  repeat bp this am after am medication administration   - continue with Amlodipine 10 mg QD, Hydralazine 100 mg Q8h, doxazosin, cozaar added   - Low sodium DASH diet  - Daily weight     Elevated Troponin  - Denies chest pain presently with history CAD s/p stents  - Troponin I: 0.074<--.096, <--.067, <--.068, peaked, likely in the setting of hypertensive urgency   - EKG SR HR @ 70 bpm  - Echo 7/20/21- normal lv ef 60%, mild mr, trace tr   - Continue statin , ASA      - Monitor and replete Lytes. Keep K > 4 and Mg > 2  - All other medical needs as per primary team.  - Other cardiovascular workup will depend on clinical course.  - Will continue to follow.  Carmelina Leger FNP-C  Cardiology NP  [id number] [phone]

## 2021-07-23 NOTE — PROGRESS NOTE ADULT - SUBJECTIVE AND OBJECTIVE BOX
ELLIOT TRIPP  76y  Male    Patient is a 76y old  Male who presents with a chief complaint of dyspnea, uncontrolled HTN, elevated troponin (23 Jul 2021 11:33)    out of bed to chair  complains of being SOB on walking a bit and can't lie down flat.       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  Had surgery ( April 2013 )    S/P tonsillectomy    S/P arthroscopic knee surgery  Bilateral ( 2005 )    Torsion of testicle  Had surgery at age 14    Pilonidal cyst  Had surgery ( 1969 )    S/P cataract surgery  Bilateral    H/O hernia repair            PHYSICAL EXAM:    T(C): 36.5 (07-23-21 @ 04:22), Max: 36.5 (07-23-21 @ 04:22)  HR: 63 (07-23-21 @ 04:22) (59 - 63)  BP: 168/88 (07-23-21 @ 04:22) (159/81 - 168/88)  RR: 18 (07-23-21 @ 04:22) (18 - 18)  SpO2: 93% (07-23-21 @ 04:22) (93% - 93%)  Wt(kg): --    I&O's Detail    22 Jul 2021 07:01  -  23 Jul 2021 07:00  --------------------------------------------------------  IN:    Oral Fluid: 200 mL  Total IN: 200 mL    OUT:  Total OUT: 0 mL    Total NET: 200 mL          Respiratory: clear anteriorly, decreased BS at bases  Cardiovascular: S1 S2  Gastrointestinal: soft NT ND +BS  Extremities: edema   Neuro: Awake and alert    MEDICATIONS  (STANDING):  amLODIPine   Tablet 10 milliGRAM(s) Oral daily  ARIPiprazole 30 milliGRAM(s) Oral daily  aspirin enteric coated 81 milliGRAM(s) Oral daily  atorvastatin 40 milliGRAM(s) Oral at bedtime  cholecalciferol 5000 Unit(s) Oral daily  ciprofloxacin     Tablet 500 milliGRAM(s) Oral every 12 hours  dextrose 40% Gel 15 Gram(s) Oral once  dextrose 5%. 1000 milliLiter(s) (50 mL/Hr) IV Continuous <Continuous>  dextrose 5%. 1000 milliLiter(s) (100 mL/Hr) IV Continuous <Continuous>  dextrose 50% Injectable 25 Gram(s) IV Push once  dextrose 50% Injectable 12.5 Gram(s) IV Push once  dextrose 50% Injectable 25 Gram(s) IV Push once  doxazosin 4 milliGRAM(s) Oral <User Schedule>  doxepin 25 milliGRAM(s) Oral at bedtime  enoxaparin Injectable 40 milliGRAM(s) SubCutaneous daily  fenofibrate Tablet 145 milliGRAM(s) Oral daily  glucagon  Injectable 1 milliGRAM(s) IntraMuscular once  hydrALAZINE 100 milliGRAM(s) Oral three times a day  insulin lispro (ADMELOG) corrective regimen sliding scale   SubCutaneous three times a day before meals  insulin lispro (ADMELOG) corrective regimen sliding scale   SubCutaneous at bedtime  iron sucrose Injectable 100 milliGRAM(s) IV Push every 24 hours  labetalol 200 milliGRAM(s) Oral three times a day  lamoTRIgine 100 milliGRAM(s) Oral daily  metroNIDAZOLE    Tablet 500 milliGRAM(s) Oral every 8 hours  mirtazapine 30 milliGRAM(s) Oral at bedtime  oxybutynin 5 milliGRAM(s) Oral three times a day  pantoprazole    Tablet 40 milliGRAM(s) Oral before breakfast  polyethylene glycol 3350 17 Gram(s) Oral daily  sodium chloride 0.9%. 1000 milliLiter(s) (50 mL/Hr) IV Continuous <Continuous>  venlafaxine XR. 150 milliGRAM(s) Oral daily    MEDICATIONS  (PRN):  acetaminophen   Tablet .. 650 milliGRAM(s) Oral every 6 hours PRN Temp greater or equal to 38.5C (101.3F), Mild Pain (1 - 3)  melatonin 3 milliGRAM(s) Oral at bedtime PRN Insomnia                            9.2    6.84  )-----------( 258      ( 23 Jul 2021 07:23 )             30.0       07-23    141  |  108  |  41<H>  ----------------------------<  90  3.8   |  25  |  2.10<H>    Ca    8.6      23 Jul 2021 07:23        Creatinine Trend: Creatinine Trend: 2.10<--, 1.80<--, 1.60<--, 1.60<--, 1.60<--

## 2021-07-23 NOTE — PROGRESS NOTE ADULT - PROBLEM SELECTOR PLAN 2
-Suspect likely demand in setting of prolonged HTN, no signs of acute ischemia  -trops will be trended till peak- peaked at 0.096 on 7/20  -Continue aspirin and statin -pt admits to abdominal pain--> U/S renal, abdominal pelvis and CT abdomen and pelvis  -CT AP--> mild uncomplicated diverticulitis and mild hepatomegaly--> started on cipro and flagyl

## 2021-07-23 NOTE — PROGRESS NOTE ADULT - PROBLEM SELECTOR PLAN 6
-DM Type 2 not on insulin  -hold home metformin  -continue sliding scale  -monitor blood glucose, hypoglycemia protocol  -check A1C in AM- 6.1 -stable, was admitted in the past for GI bleed  -no evidence of bleeding at present  -iron studies consistent with iron deficiency anemia - will check FOBT  -ferritin of 11 - will consider venofer  -vit b12 and folate WNL  -monitor H/H  -will continue with lovenox for now

## 2021-07-23 NOTE — PROGRESS NOTE ADULT - PROBLEM SELECTOR PLAN 1
-BP on arrival was 186/104, s/p IV hydralazine in ED  -continue hydralazine and amlodipine  -Added PO losartan 25 mg and PO labetalol increased to 100 mg TID for SBP in 170s-->improved SBP to 150s-160s  -to check 2D ECHO  -cycle cardiac enzymes  -cardio Dr. Monterroso following  -pt admits to abdominal pain--> U/S renal, abdominal pelvis and CT abdomen and pelvis  -CT AP--> mild uncomplicated diverticulitis and mild hepatomegaly--> started on cipro  -U/S renal f/u

## 2021-07-23 NOTE — PROGRESS NOTE ADULT - PROBLEM SELECTOR PLAN 3
-s/p PCI with stents  -taken off Plavix and statin  -continue home fenofibrate  -Continue aspirin 81mg daily and atorvastatin 40mg daily -Suspect likely demand in setting of prolonged HTN, no signs of acute ischemia  -trops will be trended till peak- peaked at 0.096 on 7/20  -Continue aspirin and statin

## 2021-07-23 NOTE — PROGRESS NOTE ADULT - PROBLEM SELECTOR PLAN 9
VTE ppx: lovenox subQ -continue home cardura  -on oxybutnin 15mg ER at home - will c/w oxybutnin 5mg TID  -f/u OP with urology

## 2021-07-23 NOTE — PROGRESS NOTE ADULT - SUBJECTIVE AND OBJECTIVE BOX
[facility] -- Fifi Bliss, Kalpesh Valdovinos, Abraham Sheridan Savella  Office # [phone]      Follow Up:    htn  Subjective/Observations:   No events overnight resting comfortably in bed.  No complaints of chest pain, dyspnea, or palpitations reported. No signs of orthopnea or PND.      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  Had surgery ( April 2013 )    S/P tonsillectomy    S/P arthroscopic knee surgery  Bilateral ( 2005 )    Torsion of testicle  Had surgery at age 14    Pilonidal cyst  Had surgery ( 1969 )    S/P cataract surgery  Bilateral    H/O hernia repair        MEDICATIONS  (STANDING):  amLODIPine   Tablet 10 milliGRAM(s) Oral daily  ARIPiprazole 30 milliGRAM(s) Oral daily  aspirin enteric coated 81 milliGRAM(s) Oral daily  atorvastatin 40 milliGRAM(s) Oral at bedtime  cholecalciferol 5000 Unit(s) Oral daily  ciprofloxacin     Tablet 500 milliGRAM(s) Oral every 12 hours  dextrose 40% Gel 15 Gram(s) Oral once  dextrose 5%. 1000 milliLiter(s) (50 mL/Hr) IV Continuous <Continuous>  dextrose 5%. 1000 milliLiter(s) (100 mL/Hr) IV Continuous <Continuous>  dextrose 50% Injectable 25 Gram(s) IV Push once  dextrose 50% Injectable 12.5 Gram(s) IV Push once  dextrose 50% Injectable 25 Gram(s) IV Push once  doxazosin 4 milliGRAM(s) Oral <User Schedule>  doxepin 25 milliGRAM(s) Oral at bedtime  enoxaparin Injectable 40 milliGRAM(s) SubCutaneous daily  fenofibrate Tablet 145 milliGRAM(s) Oral daily  glucagon  Injectable 1 milliGRAM(s) IntraMuscular once  hydrALAZINE 100 milliGRAM(s) Oral three times a day  insulin lispro (ADMELOG) corrective regimen sliding scale   SubCutaneous three times a day before meals  insulin lispro (ADMELOG) corrective regimen sliding scale   SubCutaneous at bedtime  iron sucrose Injectable 100 milliGRAM(s) IV Push every 24 hours  labetalol 200 milliGRAM(s) Oral three times a day  lamoTRIgine 100 milliGRAM(s) Oral daily  losartan 25 milliGRAM(s) Oral daily  metroNIDAZOLE    Tablet 500 milliGRAM(s) Oral every 8 hours  mirtazapine 30 milliGRAM(s) Oral at bedtime  oxybutynin 5 milliGRAM(s) Oral three times a day  pantoprazole    Tablet 40 milliGRAM(s) Oral before breakfast  polyethylene glycol 3350 17 Gram(s) Oral daily  venlafaxine XR. 150 milliGRAM(s) Oral daily    MEDICATIONS  (PRN):  acetaminophen   Tablet .. 650 milliGRAM(s) Oral every 6 hours PRN Temp greater or equal to 38.5C (101.3F), Mild Pain (1 - 3)  melatonin 3 milliGRAM(s) Oral at bedtime PRN Insomnia      Allergies    No Known Allergies    Intolerances        Vital Signs Last 24 Hrs  T(C): 36.5 (23 Jul 2021 04:22), Max: 37.1 (22 Jul 2021 10:04)  T(F): 97.7 (23 Jul 2021 04:22), Max: 98.7 (22 Jul 2021 10:04)  HR: 63 (23 Jul 2021 04:22) (59 - 66)  BP: 168/88 (23 Jul 2021 04:22) (145/77 - 168/88)  BP(mean): --  RR: 18 (23 Jul 2021 04:22) (18 - 18)  SpO2: 93% (23 Jul 2021 04:22) (93% - 96%)    I&O's Summary    22 Jul 2021 07:01  -  23 Jul 2021 07:00  --------------------------------------------------------  IN: 200 mL / OUT: 0 mL / NET: 200 mL          PHYSICAL EXAM:  TELE: SR  Constitutional: NAD, awake and alert, well-developed  HEENT: Moist Mucous Membranes, Anicteric  Pulmonary: Non-labored, breath sounds are clear bilaterally, No wheezing, crackles or rhonchi  Cardiovascular: Regular, S1 and S2 nl, No murmurs, rubs, gallops or clicks  Gastrointestinal: Bowel Sounds present, soft, nontender.   Lymph: No lymphadenopathy. No peripheral edema.  Skin: No visible rashes or ulcers.  Psych:  Mood & affect appropriate    LABS: All Labs Reviewed:                        9.2    6.84  )-----------( 258      ( 23 Jul 2021 07:23 )             30.0                         8.9    7.41  )-----------( 252      ( 22 Jul 2021 06:24 )             28.6                         10.3   7.52  )-----------( 226      ( 21 Jul 2021 07:35 )             33.1     23 Jul 2021 07:23    141    |  108    |  41     ----------------------------<  90     3.8     |  25     |  2.10   22 Jul 2021 06:23    139    |  106    |  38     ----------------------------<  106    3.4     |  26     |  1.80   21 Jul 2021 07:35    139    |  108    |  24     ----------------------------<  105    3.7     |  23     |  1.60     Ca    8.6        23 Jul 2021 07:23  Ca    8.4        22 Jul 2021 06:23  Ca    9.3        21 Jul 2021 07:35        12 Lead ECG:   Ventricular Rate 73 BPM  Atrial Rate 73 BPM  P-R Interval 256 ms  QRS Duration 114 ms  Q-T Interval 428 ms  QTC Calculation(Bazett) 471 ms  R Axis -14 degrees  T Axis 33 degrees  Diagnosis Line Sinus rhythm with 1st degree AV block  Nonspecific T wave abnormality  Abnormal ECG    Confirmed by lynne Monterroso ([id number]) on 7/19/2021 4:09:03 PM (07-19-21 @ 14:18)    < from: US Duplex Venous Lower Ext Complete, Bilateral (07.20.21 @ 01:52) >  MPRESSION:  No evidence of deep venous thrombosis in either lower extremity.    Right popliteal fossa Baker's cyst.    < from: TTE Echo Complete w/o Contrast w/ Doppler (07.20.21 @ 08:06) >    IMPRESSION:  Normal left ventricular systolic function, estimated LVEF of 60%.  Grossly normal RV size and systolic function.  Left atrial enlargement  Calcified trileaflet aortic valve with mild aortic stenosis, without AI.  Mild MR  Trace TR.  No significant pericardial effusion.

## 2021-07-23 NOTE — PROGRESS NOTE ADULT - PROBLEM SELECTOR PLAN 4
-with insomnia  -continue home lamictal, buspar, effexor, remeron and doxepin -s/p PCI with stents  -taken off Plavix and statin  -continue home fenofibrate  -Continue aspirin 81mg daily and atorvastatin 40mg daily

## 2021-07-23 NOTE — PROGRESS NOTE ADULT - PROBLEM SELECTOR PLAN 7
-Cr is 2.1 (7/23)--> increased from baseline--> added IVF and continue to monitor  -suspect 2/2 longstanding HTN +/- DM type 2  -will continue home medications for BP  -s/p 1L NS bolus in the ED - hold off further IV fluids  -monitor renal indices, avoid nephrotoxic agents -DM Type 2 not on insulin  -hold home metformin  -continue sliding scale  -monitor blood glucose, hypoglycemia protocol  -check A1C in AM- 6.1

## 2021-07-23 NOTE — PROGRESS NOTE ADULT - ASSESSMENT
76 male with a history of HTN, CAD, CHF, HLD, DM. PVD, SLE, Hepatitis C, depression and CKD now with admitted for accelerated HTN and possible ACS. S/P IV Contrast dye and was on metformin as out patient.   Renal indices are worsening now, will stop the ARB  BP management as per Cards.  cxr to rule out CHF  Continue the abx for diverticulitis.

## 2021-07-23 NOTE — PROGRESS NOTE ADULT - SUBJECTIVE AND OBJECTIVE BOX
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 SOB began 2 days while patient was lying in bed. States that he felt like he was having difficulty catching his breath and has been constant since onset. SOB not worse with exertion and has no other aggravating or alleviating factors. Has been having associated sharp jabbing pain in the lower rib areas. Pain is mild, non-radiating, gradual in onset, has no aggravating/alleviating factors, and resolves spontaneously in 30 min. Had one episode of diffuse, cramping abdominal pain yesterday evening and nausea that resolved spontaneously. Also, endorses calf soreness but states that it is not really calf pain. Of note, pt has history of HTN, does not follow with cardiologist. States that he checks his BP daily and for the past two months it has been severely elevated with SBP in the 200s. Denies having any fever, chills, cough, cold-like symptoms, headache, visual changes, chest pain, diarrhea, or dysuria. Has no sick contacts.     In the ED,  Vital Signs T(F) Max: 99.1 BP initially 186/104 (173//93) HR: 66 RR: 18 SpO2: 98%  Labs significant for: H/H 9.9/32.6, D-dimer 486, BUN/Cr 27/1.60, trop 0.068, proBNP 1995  CXR: no acute infiltrate on personal read  CT Angio Chest with IV contrast: Patent central airways. Dependent and basilar atelectasis. Otherwise clear lungs. Negative for pulmonary embolism.  EKG: SR with 1st degree AV block at 73bpm, nonspecific twave abn (19 Jul 2021 22:10)      ----  INTERVAL HPI/OVERNIGHT EVENTS: Pt seen and evaluated at the bedside. No acute overnight events occurred. Pt complains of some nonradiating LLQ pain on palpation, increased urination, SOB, and mild nausea post effexor dose when examined at bedside today. Pt denies head aches, dizziness, chest pain, palpitations, cough, vomiting, diarrhea, constipation, blood in stool, burning on urination, discolored urine.    ----  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  Had surgery ( April 2013 )    S/P tonsillectomy    S/P arthroscopic knee surgery  Bilateral ( 2005 )    Torsion of testicle  Had surgery at age 14    Pilonidal cyst  Had surgery ( 1969 )    S/P cataract surgery  Bilateral    H/O hernia repair        FAMILY HISTORY:  FHx: throat cancer    No family history of colorectal cancer        Home Medications:  amLODIPine 10 mg oral tablet: 1 tab(s) orally once a day (20 Jul 2021 00:24)  ARIPiprazole 30 mg oral tablet: 1 tab(s) orally once a day (20 Jul 2021 00:24)  ARISTADA     INJ 1064MG: INJECT 3.9ML INTRAMUSCULARLY AS DIRECTED (20 Jul 2021 00:24)  DOXAZOSIN 4MG TAB: TAKE 1 TABLET TWICE A DAY (FOR PROSTATE) (20 Jul 2021 00:24)  DOXEPIN 25MG CAP: TAKE ONE CAPSULE AT BEDTIME (20 Jul 2021 00:24)  FENOFIBRATE  TAB 145MG: TAKE ONE TABLET DAILY (FOR CHOLESTEROL) (20 Jul 2021 00:24)  hydrALAZINE 100 mg oral tablet: 1 tab(s) orally 3 times a day (20 Jul 2021 00:24)  labetalol 100 mg oral tablet: 1 tab(s) orally once a day (20 Jul 2021 00:24)  LAMOTRIGINE 100MG TA: TAKE ONE TABLET DAILY (20 Jul 2021 00:24)  meloxicam 7.5 mg oral tablet: 1 tab(s) orally 2 times a day (20 Jul 2021 00:24)  metFORMIN 500 mg oral tablet: 1 tab(s) orally once a day (20 Jul 2021 00:24)  MIRTAZAPINE  TAB 30MG: TAKE 1 TABLET AT BEDTIME (20 Jul 2021 00:24)  OMEPRAZOLE 40MG CAP: TAKE 1 CAPSULE DAILY (FOR STOMACH) (20 Jul 2021 00:24)  OXYBUTYNIN ER 15MG TAB: TAKE ONE TABLET DAILY (20 Jul 2021 00:24)  VASCEPA 1GM CAP: 2 cap(s) orally 2 times a day (20 Jul 2021 00:24)  VENLAFAXINE XR 150MG CAPS: TAKE 1 CAPSULE DAILY (20 Jul 2021 00:24)  VITAMIN D 1000UNIT (M) TAB: TAKE 5 TABLETS DAILY (20 Jul 2021 00:24)      Allergies    No Known Allergies    Intolerances        ----  MEDICATIONS  (STANDING):  amLODIPine   Tablet 10 milliGRAM(s) Oral daily  ARIPiprazole 30 milliGRAM(s) Oral daily  aspirin enteric coated 81 milliGRAM(s) Oral daily  atorvastatin 40 milliGRAM(s) Oral at bedtime  cholecalciferol 5000 Unit(s) Oral daily  ciprofloxacin     Tablet 500 milliGRAM(s) Oral every 12 hours  dextrose 40% Gel 15 Gram(s) Oral once  dextrose 5%. 1000 milliLiter(s) (50 mL/Hr) IV Continuous <Continuous>  dextrose 5%. 1000 milliLiter(s) (100 mL/Hr) IV Continuous <Continuous>  dextrose 50% Injectable 25 Gram(s) IV Push once  dextrose 50% Injectable 12.5 Gram(s) IV Push once  dextrose 50% Injectable 25 Gram(s) IV Push once  doxazosin 4 milliGRAM(s) Oral <User Schedule>  doxepin 25 milliGRAM(s) Oral at bedtime  enoxaparin Injectable 40 milliGRAM(s) SubCutaneous daily  fenofibrate Tablet 145 milliGRAM(s) Oral daily  glucagon  Injectable 1 milliGRAM(s) IntraMuscular once  hydrALAZINE 100 milliGRAM(s) Oral three times a day  insulin lispro (ADMELOG) corrective regimen sliding scale   SubCutaneous three times a day before meals  insulin lispro (ADMELOG) corrective regimen sliding scale   SubCutaneous at bedtime  iron sucrose Injectable 100 milliGRAM(s) IV Push every 24 hours  labetalol 200 milliGRAM(s) Oral three times a day  lamoTRIgine 100 milliGRAM(s) Oral daily  metroNIDAZOLE    Tablet 500 milliGRAM(s) Oral every 8 hours  mirtazapine 30 milliGRAM(s) Oral at bedtime  oxybutynin 5 milliGRAM(s) Oral three times a day  pantoprazole    Tablet 40 milliGRAM(s) Oral before breakfast  polyethylene glycol 3350 17 Gram(s) Oral daily  sodium chloride 0.9%. 1000 milliLiter(s) (50 mL/Hr) IV Continuous <Continuous>  venlafaxine XR. 150 milliGRAM(s) Oral daily    MEDICATIONS  (PRN):  acetaminophen   Tablet .. 650 milliGRAM(s) Oral every 6 hours PRN Temp greater or equal to 38.5C (101.3F), Mild Pain (1 - 3)  melatonin 3 milliGRAM(s) Oral at bedtime PRN Insomnia      ----  REVIEW OF SYSTEMS:  Constitutional: denies fever, chills  HEENT: denies headache, dizziness, or lightheadedness  Respiratory: denies cough, or wheezing. Admits to SOB  Cardiovascular: denies CP, palpitations  Gastrointestinal: denies vomiting, diarrhea, constipation, or bloody stools  Genitourinary: denies painful urination, urgency, or bloody urine  Skin/Breast: denies rashes or itching  Musculoskeletal: denies muscle aches, joint swelling, or muscle weakness  Neurologic: denies loss of sensation, numbness, or tingling    ----  PHYSICAL EXAM:  Gen: well appearing, NAD  HEENT: NCAT, PEERLA b/l, EOMI b/l, no conjunctival erythema  Cardio: regular rate and rhythm, +s1s2, no murmurs, rubs, or gallops  Pulm: CTA b/l, no wheezes, rales or rhonchi  Abdomen: soft, nontender, nondistended, +BS x4 quadrants, no guarding  Extremities: no clubbing, cyanosis or edema, +2 pedal pulses  Neuro: AAOx3, CNII-XII intact grossly, 5/5 strength all extremities, sensation intact  Skin: warm and dry      T(C): 36.5 (07-23-21 @ 04:22), Max: 36.5 (07-23-21 @ 04:22)  HR: 63 (07-23-21 @ 04:22) (59 - 63)  BP: 168/88 (07-23-21 @ 04:22) (159/81 - 168/88)  RR: 18 (07-23-21 @ 04:22) (18 - 18)  SpO2: 93% (07-23-21 @ 04:22) (93% - 93%)  Wt(kg): --    ----  I&O's Summary    22 Jul 2021 07:01  -  23 Jul 2021 07:00  --------------------------------------------------------  IN: 200 mL / OUT: 0 mL / NET: 200 mL        LABS:                        9.2    6.84  )-----------( 258      ( 23 Jul 2021 07:23 )             30.0     07-23    141  |  108  |  41<H>  ----------------------------<  90  3.8   |  25  |  2.10<H>    Ca    8.6      23 Jul 2021 07:23          CAPILLARY BLOOD GLUCOSE      POCT Blood Glucose.: 168 mg/dL (23 Jul 2021 11:50)  POCT Blood Glucose.: 109 mg/dL (23 Jul 2021 08:05)  POCT Blood Glucose.: 156 mg/dL (22 Jul 2021 21:00)  POCT Blood Glucose.: 120 mg/dL (22 Jul 2021 16:50)                ----  Personally reviewed:  Vital sign trends: [  ] yes    [  ] no     [  ] n/a  Laboratory results: [  ] yes    [  ] no     [  ] n/a  Radiology results: [  ] yes    [  ] no     [  ] n/a  Culture results: [  ] yes    [  ] no     [  ] n/a  Consultant recommendations: [  ] yes    [  ] no     [  ] n/a

## 2021-07-23 NOTE — PROGRESS NOTE ADULT - PROBLEM SELECTOR PLAN 8
-continue home cardura  -on oxybutnin 15mg ER at home - will c/w oxybutnin 5mg TID  -f/u OP with urology -Cr is 2.1 (7/23)--> increased from baseline--> added IVF and continue to monitor  -suspect 2/2 longstanding HTN +/- DM type 2  -will continue home medications for BP  -s/p 1L NS bolus in the ED - hold off further IV fluids  -monitor renal indices, avoid nephrotoxic agents

## 2021-07-24 LAB
ANION GAP SERPL CALC-SCNC: 7 MMOL/L — SIGNIFICANT CHANGE UP (ref 5–17)
BASOPHILS # BLD AUTO: 0.03 K/UL — SIGNIFICANT CHANGE UP (ref 0–0.2)
BASOPHILS NFR BLD AUTO: 0.5 % — SIGNIFICANT CHANGE UP (ref 0–2)
BUN SERPL-MCNC: 37 MG/DL — HIGH (ref 7–23)
CALCIUM SERPL-MCNC: 8.5 MG/DL — SIGNIFICANT CHANGE UP (ref 8.5–10.1)
CHLORIDE SERPL-SCNC: 108 MMOL/L — SIGNIFICANT CHANGE UP (ref 96–108)
CO2 SERPL-SCNC: 26 MMOL/L — SIGNIFICANT CHANGE UP (ref 22–31)
CREAT SERPL-MCNC: 1.9 MG/DL — HIGH (ref 0.5–1.3)
EOSINOPHIL # BLD AUTO: 0.23 K/UL — SIGNIFICANT CHANGE UP (ref 0–0.5)
EOSINOPHIL NFR BLD AUTO: 3.5 % — SIGNIFICANT CHANGE UP (ref 0–6)
GLUCOSE SERPL-MCNC: 104 MG/DL — HIGH (ref 70–99)
HCT VFR BLD CALC: 31.2 % — LOW (ref 39–50)
HGB BLD-MCNC: 9.5 G/DL — LOW (ref 13–17)
IMM GRANULOCYTES NFR BLD AUTO: 0.5 % — SIGNIFICANT CHANGE UP (ref 0–1.5)
LYMPHOCYTES # BLD AUTO: 1.84 K/UL — SIGNIFICANT CHANGE UP (ref 1–3.3)
LYMPHOCYTES # BLD AUTO: 28 % — SIGNIFICANT CHANGE UP (ref 13–44)
MCHC RBC-ENTMCNC: 25 PG — LOW (ref 27–34)
MCHC RBC-ENTMCNC: 30.4 GM/DL — LOW (ref 32–36)
MCV RBC AUTO: 82.1 FL — SIGNIFICANT CHANGE UP (ref 80–100)
MONOCYTES # BLD AUTO: 0.5 K/UL — SIGNIFICANT CHANGE UP (ref 0–0.9)
MONOCYTES NFR BLD AUTO: 7.6 % — SIGNIFICANT CHANGE UP (ref 2–14)
NEUTROPHILS # BLD AUTO: 3.94 K/UL — SIGNIFICANT CHANGE UP (ref 1.8–7.4)
NEUTROPHILS NFR BLD AUTO: 59.9 % — SIGNIFICANT CHANGE UP (ref 43–77)
NRBC # BLD: 0 /100 WBCS — SIGNIFICANT CHANGE UP (ref 0–0)
PLATELET # BLD AUTO: 269 K/UL — SIGNIFICANT CHANGE UP (ref 150–400)
POTASSIUM SERPL-MCNC: 3.8 MMOL/L — SIGNIFICANT CHANGE UP (ref 3.5–5.3)
POTASSIUM SERPL-SCNC: 3.8 MMOL/L — SIGNIFICANT CHANGE UP (ref 3.5–5.3)
RBC # BLD: 3.8 M/UL — LOW (ref 4.2–5.8)
RBC # FLD: 17.2 % — HIGH (ref 10.3–14.5)
SODIUM SERPL-SCNC: 141 MMOL/L — SIGNIFICANT CHANGE UP (ref 135–145)
WBC # BLD: 6.57 K/UL — SIGNIFICANT CHANGE UP (ref 3.8–10.5)
WBC # FLD AUTO: 6.57 K/UL — SIGNIFICANT CHANGE UP (ref 3.8–10.5)

## 2021-07-24 PROCEDURE — 99232 SBSQ HOSP IP/OBS MODERATE 35: CPT

## 2021-07-24 PROCEDURE — 99233 SBSQ HOSP IP/OBS HIGH 50: CPT | Mod: GC

## 2021-07-24 RX ADMIN — Medication 5 MILLIGRAM(S): at 21:29

## 2021-07-24 RX ADMIN — Medication 100 MILLIGRAM(S): at 21:27

## 2021-07-24 RX ADMIN — Medication 100 MILLIGRAM(S): at 05:58

## 2021-07-24 RX ADMIN — Medication 100 MILLIGRAM(S): at 13:19

## 2021-07-24 RX ADMIN — IRON SUCROSE 100 MILLIGRAM(S): 20 INJECTION, SOLUTION INTRAVENOUS at 16:05

## 2021-07-24 RX ADMIN — Medication 150 MILLIGRAM(S): at 11:38

## 2021-07-24 RX ADMIN — Medication 5 MILLIGRAM(S): at 05:58

## 2021-07-24 RX ADMIN — Medication 500 MILLIGRAM(S): at 21:29

## 2021-07-24 RX ADMIN — ENOXAPARIN SODIUM 40 MILLIGRAM(S): 100 INJECTION SUBCUTANEOUS at 11:37

## 2021-07-24 RX ADMIN — LAMOTRIGINE 100 MILLIGRAM(S): 25 TABLET, ORALLY DISINTEGRATING ORAL at 11:38

## 2021-07-24 RX ADMIN — POLYETHYLENE GLYCOL 3350 17 GRAM(S): 17 POWDER, FOR SOLUTION ORAL at 11:37

## 2021-07-24 RX ADMIN — ARIPIPRAZOLE 30 MILLIGRAM(S): 15 TABLET ORAL at 11:38

## 2021-07-24 RX ADMIN — Medication 25 MILLIGRAM(S): at 21:26

## 2021-07-24 RX ADMIN — Medication 500 MILLIGRAM(S): at 05:59

## 2021-07-24 RX ADMIN — Medication 5 MILLIGRAM(S): at 13:24

## 2021-07-24 RX ADMIN — PANTOPRAZOLE SODIUM 40 MILLIGRAM(S): 20 TABLET, DELAYED RELEASE ORAL at 05:58

## 2021-07-24 RX ADMIN — Medication 300 MILLIGRAM(S): at 05:59

## 2021-07-24 RX ADMIN — Medication 5000 UNIT(S): at 11:37

## 2021-07-24 RX ADMIN — Medication 650 MILLIGRAM(S): at 11:38

## 2021-07-24 RX ADMIN — Medication 300 MILLIGRAM(S): at 21:29

## 2021-07-24 RX ADMIN — Medication 650 MILLIGRAM(S): at 12:05

## 2021-07-24 RX ADMIN — Medication 300 MILLIGRAM(S): at 13:24

## 2021-07-24 RX ADMIN — ATORVASTATIN CALCIUM 40 MILLIGRAM(S): 80 TABLET, FILM COATED ORAL at 21:26

## 2021-07-24 RX ADMIN — Medication 500 MILLIGRAM(S): at 05:58

## 2021-07-24 RX ADMIN — Medication 500 MILLIGRAM(S): at 17:16

## 2021-07-24 RX ADMIN — Medication 81 MILLIGRAM(S): at 11:38

## 2021-07-24 RX ADMIN — AMLODIPINE BESYLATE 10 MILLIGRAM(S): 2.5 TABLET ORAL at 05:58

## 2021-07-24 RX ADMIN — Medication 145 MILLIGRAM(S): at 11:38

## 2021-07-24 RX ADMIN — Medication 4 MILLIGRAM(S): at 17:16

## 2021-07-24 RX ADMIN — MIRTAZAPINE 30 MILLIGRAM(S): 45 TABLET, ORALLY DISINTEGRATING ORAL at 21:29

## 2021-07-24 RX ADMIN — Medication 4 MILLIGRAM(S): at 05:59

## 2021-07-24 RX ADMIN — Medication 500 MILLIGRAM(S): at 13:24

## 2021-07-24 NOTE — PROGRESS NOTE ADULT - SUBJECTIVE AND OBJECTIVE BOX
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 SOB began 2 days while patient was lying in bed. States that he felt like he was having difficulty catching his breath and has been constant since onset. SOB not worse with exertion and has no other aggravating or alleviating factors. Has been having associated sharp jabbing pain in the lower rib areas. Pain is mild, non-radiating, gradual in onset, has no aggravating/alleviating factors, and resolves spontaneously in 30 min. Had one episode of diffuse, cramping abdominal pain yesterday evening and nausea that resolved spontaneously. Also, endorses calf soreness but states that it is not really calf pain. Of note, pt has history of HTN, does not follow with cardiologist. States that he checks his BP daily and for the past two months it has been severely elevated with SBP in the 200s. Denies having any fever, chills, cough, cold-like symptoms, headache, visual changes, chest pain, diarrhea, or dysuria. Has no sick contacts.     In the ED,  Vital Signs T(F) Max: 99.1 BP initially 186/104 (173//93) HR: 66 RR: 18 SpO2: 98%  Labs significant for: H/H 9.9/32.6, D-dimer 486, BUN/Cr 27/1.60, trop 0.068, proBNP 1995  CXR: no acute infiltrate on personal read  CT Angio Chest with IV contrast: Patent central airways. Dependent and basilar atelectasis. Otherwise clear lungs. Negative for pulmonary embolism.  EKG: SR with 1st degree AV block at 73bpm, nonspecific twave abn (19 Jul 2021 22:10)      ----  INTERVAL HPI/OVERNIGHT EVENTS: Pt seen and evaluated at the bedside. No acute overnight events occurred. Patient reports feeling short of breath while laying down. CXR shows no evidence of consolidation on effusion. Likely 2/2 patients large body habitus. Patient denies chest pain, palpitations cough, vomiting, diarrhea, constipation. Patient's abdominal pain has resolved as well.    ----  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  Had surgery ( April 2013 )    S/P tonsillectomy    S/P arthroscopic knee surgery  Bilateral ( 2005 )    Torsion of testicle  Had surgery at age 14    Pilonidal cyst  Had surgery ( 1969 )    S/P cataract surgery  Bilateral    H/O hernia repair        FAMILY HISTORY:  FHx: throat cancer    No family history of colorectal cancer        Home Medications:  amLODIPine 10 mg oral tablet: 1 tab(s) orally once a day (20 Jul 2021 00:24)  ARIPiprazole 30 mg oral tablet: 1 tab(s) orally once a day (20 Jul 2021 00:24)  ARISTADA     INJ 1064MG: INJECT 3.9ML INTRAMUSCULARLY AS DIRECTED (20 Jul 2021 00:24)  DOXAZOSIN 4MG TAB: TAKE 1 TABLET TWICE A DAY (FOR PROSTATE) (20 Jul 2021 00:24)  DOXEPIN 25MG CAP: TAKE ONE CAPSULE AT BEDTIME (20 Jul 2021 00:24)  FENOFIBRATE  TAB 145MG: TAKE ONE TABLET DAILY (FOR CHOLESTEROL) (20 Jul 2021 00:24)  hydrALAZINE 100 mg oral tablet: 1 tab(s) orally 3 times a day (20 Jul 2021 00:24)  labetalol 100 mg oral tablet: 1 tab(s) orally once a day (20 Jul 2021 00:24)  LAMOTRIGINE 100MG TA: TAKE ONE TABLET DAILY (20 Jul 2021 00:24)  meloxicam 7.5 mg oral tablet: 1 tab(s) orally 2 times a day (20 Jul 2021 00:24)  metFORMIN 500 mg oral tablet: 1 tab(s) orally once a day (20 Jul 2021 00:24)  MIRTAZAPINE  TAB 30MG: TAKE 1 TABLET AT BEDTIME (20 Jul 2021 00:24)  OMEPRAZOLE 40MG CAP: TAKE 1 CAPSULE DAILY (FOR STOMACH) (20 Jul 2021 00:24)  OXYBUTYNIN ER 15MG TAB: TAKE ONE TABLET DAILY (20 Jul 2021 00:24)  VASCEPA 1GM CAP: 2 cap(s) orally 2 times a day (20 Jul 2021 00:24)  VENLAFAXINE XR 150MG CAPS: TAKE 1 CAPSULE DAILY (20 Jul 2021 00:24)  VITAMIN D 1000UNIT (M) TAB: TAKE 5 TABLETS DAILY (20 Jul 2021 00:24)      Allergies    No Known Allergies    Intolerances        ----  MEDICATIONS  (STANDING):  amLODIPine   Tablet 10 milliGRAM(s) Oral daily  ARIPiprazole 30 milliGRAM(s) Oral daily  aspirin enteric coated 81 milliGRAM(s) Oral daily  atorvastatin 40 milliGRAM(s) Oral at bedtime  cholecalciferol 5000 Unit(s) Oral daily  ciprofloxacin     Tablet 500 milliGRAM(s) Oral every 12 hours  dextrose 40% Gel 15 Gram(s) Oral once  dextrose 5%. 1000 milliLiter(s) (50 mL/Hr) IV Continuous <Continuous>  dextrose 5%. 1000 milliLiter(s) (100 mL/Hr) IV Continuous <Continuous>  dextrose 50% Injectable 25 Gram(s) IV Push once  dextrose 50% Injectable 12.5 Gram(s) IV Push once  dextrose 50% Injectable 25 Gram(s) IV Push once  doxazosin 4 milliGRAM(s) Oral <User Schedule>  doxepin 25 milliGRAM(s) Oral at bedtime  enoxaparin Injectable 40 milliGRAM(s) SubCutaneous daily  fenofibrate Tablet 145 milliGRAM(s) Oral daily  glucagon  Injectable 1 milliGRAM(s) IntraMuscular once  hydrALAZINE 100 milliGRAM(s) Oral three times a day  insulin lispro (ADMELOG) corrective regimen sliding scale   SubCutaneous three times a day before meals  insulin lispro (ADMELOG) corrective regimen sliding scale   SubCutaneous at bedtime  iron sucrose Injectable 100 milliGRAM(s) IV Push every 24 hours  labetalol 300 milliGRAM(s) Oral three times a day  lamoTRIgine 100 milliGRAM(s) Oral daily  metroNIDAZOLE    Tablet 500 milliGRAM(s) Oral every 8 hours  mirtazapine 30 milliGRAM(s) Oral at bedtime  oxybutynin 5 milliGRAM(s) Oral three times a day  pantoprazole    Tablet 40 milliGRAM(s) Oral before breakfast  polyethylene glycol 3350 17 Gram(s) Oral daily  venlafaxine XR. 150 milliGRAM(s) Oral daily    MEDICATIONS  (PRN):  acetaminophen   Tablet .. 650 milliGRAM(s) Oral every 6 hours PRN Temp greater or equal to 38.5C (101.3F), Mild Pain (1 - 3)  melatonin 3 milliGRAM(s) Oral at bedtime PRN Insomnia      ----  REVIEW OF SYSTEMS:  Constitutional: denies fever, chills  HEENT: denies headache, dizziness, or lightheadedness  Respiratory: denies SOB, cough, or wheezing  Cardiovascular: denies CP, palpitations  Gastrointestinal: denies nausea, vomiting, diarrhea, constipation, abdominal pain, or bloody stools  Genitourinary: denies painful urination, increased frequency, urgency, or bloody urine  Skin/Breast: denies rashes or itching  Musculoskeletal: denies muscle aches, joint swelling, or muscle weakness  Neurologic: denies loss of sensation, numbness, or tingling    ----  PHYSICAL EXAM:  Gen: well appearing, NAD  HEENT: NCAT, PEERLA b/l, EOMI b/l, no conjunctival erythema  Cardio: regular rate and rhythm, +s1s2, no murmurs, rubs, or gallops  Pulm: CTA b/l, no wheezes, rales or rhonchi  Abdomen: soft, nontender, nondistended, +BS x4 quadrants, no guarding  Extremities: no clubbing, cyanosis or edema, +2 pedal pulses  Neuro: AAOx3, CNII-XII intact grossly, 5/5 strength all extremities, sensation intact  Skin: warm and dry      T(C): 36.5 (07-24-21 @ 12:36), Max: 36.9 (07-23-21 @ 23:40)  HR: 63 (07-24-21 @ 12:36) (57 - 65)  BP: 157/78 (07-24-21 @ 12:36) (151/88 - 172/87)  RR: 18 (07-24-21 @ 12:36) (16 - 18)  SpO2: 96% (07-24-21 @ 12:36) (92% - 96%)  Wt(kg): --    ----  I&O's Summary    23 Jul 2021 07:01  -  24 Jul 2021 07:00  --------------------------------------------------------  IN: 1200 mL / OUT: 0 mL / NET: 1200 mL        LABS:                        9.5    6.57  )-----------( 269      ( 24 Jul 2021 07:41 )             31.2     07-24    141  |  108  |  37<H>  ----------------------------<  104<H>  3.8   |  26  |  1.90<H>    Ca    8.5      24 Jul 2021 07:41          CAPILLARY BLOOD GLUCOSE      POCT Blood Glucose.: 107 mg/dL (24 Jul 2021 12:14)  POCT Blood Glucose.: 142 mg/dL (24 Jul 2021 08:10)  POCT Blood Glucose.: 124 mg/dL (23 Jul 2021 21:15)  POCT Blood Glucose.: 101 mg/dL (23 Jul 2021 16:51)                ----  Personally reviewed:  Vital sign trends: [  ] yes    [  ] no     [  ] n/a  Laboratory results: [  ] yes    [  ] no     [  ] n/a  Radiology results: [  ] yes    [  ] no     [  ] n/a  Culture results: [  ] yes    [  ] no     [  ] n/a  Consultant recommendations: [  ] yes    [  ] no     [  ] n/a

## 2021-07-24 NOTE — PROGRESS NOTE ADULT - PROBLEM SELECTOR PLAN 4
Clear bilaterally, pupils equal, round and reactive to light. -s/p PCI with stents  -taken off Plavix and statin  -continue home fenofibrate  -Continue aspirin 81mg daily and atorvastatin 40mg daily

## 2021-07-24 NOTE — PROGRESS NOTE ADULT - SUBJECTIVE AND OBJECTIVE BOX
· Subjective and Objective:   [facility] -- Fifi Bliss, Bonifacio, Kalpesh, Abraham Sheridan Savella  Office # [phone]      Follow Up:    htn  Subjective/Observations:   No events overnight resting comfortably in bed.  No complaints of chest pain, dyspnea, or palpitations reported. No signs of orthopnea or PND.      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  Had surgery ( April 2013 )    S/P tonsillectomy    S/P arthroscopic knee surgery  Bilateral ( 2005 )    Torsion of testicle  Had surgery at age 14    Pilonidal cyst  Had surgery ( 1969 )    S/P cataract surgery  Bilateral    H/O hernia repair        MEDICATIONS  (STANDING):  amLODIPine   Tablet 10 milliGRAM(s) Oral daily  ARIPiprazole 30 milliGRAM(s) Oral daily  aspirin enteric coated 81 milliGRAM(s) Oral daily  atorvastatin 40 milliGRAM(s) Oral at bedtime  cholecalciferol 5000 Unit(s) Oral daily  ciprofloxacin     Tablet 500 milliGRAM(s) Oral every 12 hours  dextrose 40% Gel 15 Gram(s) Oral once  dextrose 5%. 1000 milliLiter(s) (50 mL/Hr) IV Continuous <Continuous>  dextrose 5%. 1000 milliLiter(s) (100 mL/Hr) IV Continuous <Continuous>  dextrose 50% Injectable 25 Gram(s) IV Push once  dextrose 50% Injectable 12.5 Gram(s) IV Push once  dextrose 50% Injectable 25 Gram(s) IV Push once  doxazosin 4 milliGRAM(s) Oral <User Schedule>  doxepin 25 milliGRAM(s) Oral at bedtime  enoxaparin Injectable 40 milliGRAM(s) SubCutaneous daily  fenofibrate Tablet 145 milliGRAM(s) Oral daily  glucagon  Injectable 1 milliGRAM(s) IntraMuscular once  hydrALAZINE 100 milliGRAM(s) Oral three times a day  insulin lispro (ADMELOG) corrective regimen sliding scale   SubCutaneous three times a day before meals  insulin lispro (ADMELOG) corrective regimen sliding scale   SubCutaneous at bedtime  iron sucrose Injectable 100 milliGRAM(s) IV Push every 24 hours  labetalol 200 milliGRAM(s) Oral three times a day  lamoTRIgine 100 milliGRAM(s) Oral daily  losartan 25 milliGRAM(s) Oral daily  metroNIDAZOLE    Tablet 500 milliGRAM(s) Oral every 8 hours  mirtazapine 30 milliGRAM(s) Oral at bedtime  oxybutynin 5 milliGRAM(s) Oral three times a day  pantoprazole    Tablet 40 milliGRAM(s) Oral before breakfast  polyethylene glycol 3350 17 Gram(s) Oral daily  venlafaxine XR. 150 milliGRAM(s) Oral daily    MEDICATIONS  (PRN):  acetaminophen   Tablet .. 650 milliGRAM(s) Oral every 6 hours PRN Temp greater or equal to 38.5C (101.3F), Mild Pain (1 - 3)  melatonin 3 milliGRAM(s) Oral at bedtime PRN Insomnia      Allergies    No Known Allergies    Intolerances        Vital Signs Last 24 Hrs  T(C): 36.5 (23 Jul 2021 04:22), Max: 37.1 (22 Jul 2021 10:04)  T(F): 97.7 (23 Jul 2021 04:22), Max: 98.7 (22 Jul 2021 10:04)  HR: 63 (23 Jul 2021 04:22) (59 - 66)  BP: 168/88 (23 Jul 2021 04:22) (145/77 - 168/88)  BP(mean): --  RR: 18 (23 Jul 2021 04:22) (18 - 18)  SpO2: 93% (23 Jul 2021 04:22) (93% - 96%)    I&O's Summary    22 Jul 2021 07:01  -  23 Jul 2021 07:00  --------------------------------------------------------  IN: 200 mL / OUT: 0 mL / NET: 200 mL          PHYSICAL EXAM:  TELE: SR  Constitutional: NAD, awake and alert, well-developed  HEENT: Moist Mucous Membranes, Anicteric  Pulmonary: Non-labored, breath sounds are clear bilaterally, No wheezing, crackles or rhonchi  Cardiovascular: Regular, S1 and S2 nl, No murmurs, rubs, gallops or clicks  Gastrointestinal: Bowel Sounds present, soft, nontender.   Lymph: No lymphadenopathy. No peripheral edema.  Skin: No visible rashes or ulcers.  Psych:  Mood & affect appropriate    LABS: All Labs Reviewed:                        9.2    6.84  )-----------( 258      ( 23 Jul 2021 07:23 )             30.0                         8.9    7.41  )-----------( 252      ( 22 Jul 2021 06:24 )             28.6                         10.3   7.52  )-----------( 226      ( 21 Jul 2021 07:35 )             33.1     23 Jul 2021 07:23    141    |  108    |  41     ----------------------------<  90     3.8     |  25     |  2.10   22 Jul 2021 06:23    139    |  106    |  38     ----------------------------<  106    3.4     |  26     |  1.80   21 Jul 2021 07:35    139    |  108    |  24     ----------------------------<  105    3.7     |  23     |  1.60     Ca    8.6        23 Jul 2021 07:23  Ca    8.4        22 Jul 2021 06:23  Ca    9.3        21 Jul 2021 07:35        12 Lead ECG:   Ventricular Rate 73 BPM  Atrial Rate 73 BPM  P-R Interval 256 ms  QRS Duration 114 ms  Q-T Interval 428 ms  QTC Calculation(Bazett) 471 ms  R Axis -14 degrees  T Axis 33 degrees  Diagnosis Line Sinus rhythm with 1st degree AV block  Nonspecific T wave abnormality  Abnormal ECG    Confirmed by lynne Monterroso ([id number]) on 7/19/2021 4:09:03 PM (07-19-21 @ 14:18)    < from: US Duplex Venous Lower Ext Complete, Bilateral (07.20.21 @ 01:52) >  MPRESSION:  No evidence of deep venous thrombosis in either lower extremity.    Right popliteal fossa Baker's cyst.    < from: TTE Echo Complete w/o Contrast w/ Doppler (07.20.21 @ 08:06) >    IMPRESSION:  Normal left ventricular systolic function, estimated LVEF of 60%.  Grossly normal RV size and systolic function.  Left atrial enlargement  Calcified trileaflet aortic valve with mild aortic stenosis, without AI.  Mild MR  Trace TR.  No significant pericardial effusion.

## 2021-07-24 NOTE — PROGRESS NOTE ADULT - ASSESSMENT
76 male with a history of HTN, CAD, CHF, HLD, DM. PVD, SLE, Hepatitis C, depression and CKD now with admitted for accelerated HTN and possible ACS. S/P IV Contrast dye and was on metformin as out patient.   Renal indices are better and is now stable for discharge from renal stand point to follow up as out patient with Dr. Rodarte.  BP management as per Cards.

## 2021-07-24 NOTE — PROGRESS NOTE ADULT - SUBJECTIVE AND OBJECTIVE BOX
TRIPP ZARATE  76y  Male    Patient is a 76y old  Male who presents with a chief complaint of dyspnea, uncontrolled HTN, elevated troponin (24 Jul 2021 09:06)    out of bed to chair      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  Had surgery ( April 2013 )    S/P tonsillectomy    S/P arthroscopic knee surgery  Bilateral ( 2005 )    Torsion of testicle  Had surgery at age 14    Pilonidal cyst  Had surgery ( 1969 )    S/P cataract surgery  Bilateral    H/O hernia repair            PHYSICAL EXAM:    T(C): 36.5 (07-24-21 @ 12:36), Max: 36.9 (07-23-21 @ 23:40)  HR: 63 (07-24-21 @ 12:36) (57 - 65)  BP: 157/78 (07-24-21 @ 12:36) (151/88 - 172/87)  RR: 18 (07-24-21 @ 12:36) (16 - 18)  SpO2: 96% (07-24-21 @ 12:36) (92% - 96%)  Wt(kg): --    I&O's Detail    23 Jul 2021 07:01  -  24 Jul 2021 07:00  --------------------------------------------------------  IN:    Oral Fluid: 1200 mL  Total IN: 1200 mL    OUT:  Total OUT: 0 mL    Total NET: 1200 mL          Respiratory: clear anteriorly, decreased BS at bases  Cardiovascular: S1 S2  Gastrointestinal: soft NT ND +BS  Extremities: edema   Neuro: Awake and alert    MEDICATIONS  (STANDING):  amLODIPine   Tablet 10 milliGRAM(s) Oral daily  ARIPiprazole 30 milliGRAM(s) Oral daily  aspirin enteric coated 81 milliGRAM(s) Oral daily  atorvastatin 40 milliGRAM(s) Oral at bedtime  cholecalciferol 5000 Unit(s) Oral daily  ciprofloxacin     Tablet 500 milliGRAM(s) Oral every 12 hours  dextrose 40% Gel 15 Gram(s) Oral once  dextrose 5%. 1000 milliLiter(s) (50 mL/Hr) IV Continuous <Continuous>  dextrose 5%. 1000 milliLiter(s) (100 mL/Hr) IV Continuous <Continuous>  dextrose 50% Injectable 25 Gram(s) IV Push once  dextrose 50% Injectable 12.5 Gram(s) IV Push once  dextrose 50% Injectable 25 Gram(s) IV Push once  doxazosin 4 milliGRAM(s) Oral <User Schedule>  doxepin 25 milliGRAM(s) Oral at bedtime  enoxaparin Injectable 40 milliGRAM(s) SubCutaneous daily  fenofibrate Tablet 145 milliGRAM(s) Oral daily  glucagon  Injectable 1 milliGRAM(s) IntraMuscular once  hydrALAZINE 100 milliGRAM(s) Oral three times a day  insulin lispro (ADMELOG) corrective regimen sliding scale   SubCutaneous three times a day before meals  insulin lispro (ADMELOG) corrective regimen sliding scale   SubCutaneous at bedtime  iron sucrose Injectable 100 milliGRAM(s) IV Push every 24 hours  labetalol 300 milliGRAM(s) Oral three times a day  lamoTRIgine 100 milliGRAM(s) Oral daily  metroNIDAZOLE    Tablet 500 milliGRAM(s) Oral every 8 hours  mirtazapine 30 milliGRAM(s) Oral at bedtime  oxybutynin 5 milliGRAM(s) Oral three times a day  pantoprazole    Tablet 40 milliGRAM(s) Oral before breakfast  polyethylene glycol 3350 17 Gram(s) Oral daily  venlafaxine XR. 150 milliGRAM(s) Oral daily    MEDICATIONS  (PRN):  acetaminophen   Tablet .. 650 milliGRAM(s) Oral every 6 hours PRN Temp greater or equal to 38.5C (101.3F), Mild Pain (1 - 3)  melatonin 3 milliGRAM(s) Oral at bedtime PRN Insomnia                            9.5    6.57  )-----------( 269      ( 24 Jul 2021 07:41 )             31.2       07-24    141  |  108  |  37<H>  ----------------------------<  104<H>  3.8   |  26  |  1.90<H>    Ca    8.5      24 Jul 2021 07:41        Creatinine Trend: Creatinine Trend: 1.90<--, 2.10<--, 1.80<--, 1.60<--, 1.60<--, 1.60<--

## 2021-07-24 NOTE — PROGRESS NOTE ADULT - PROBLEM SELECTOR PLAN 1
-BP on arrival was 186/104, s/p IV hydralazine in ED  -continue hydralazine and amlodipine  -Added PO losartan 25 mg--> d/c due to elevated Cr at 2.1 (resolved once d/c)  - PO labetalol increased to 300 mg TID for SBP in 170s-->improved SBP to 150s-160s  -to check 2D ECHO  -cycle cardiac enzymes  -cardio Dr. Monterroso following  -pt admits to abdominal pain--> U/S abdominal pelvis and CT abdomen and pelvis  -CT AP--> mild uncomplicated diverticulitis and mild hepatomegaly--> started on cipro  U/S A/P--> no evidence of renal artery stenosis -BP on arrival was 186/104, s/p IV hydralazine in ED  -continue hydralazine and amlodipine  -Added PO losartan 25 mg--> d/c due to elevated Cr at 2.1 (resolved once d/c)  - PO labetalol increased to 300 mg TID for SBP in 170s-->improved SBP to 150s-160s  -to check 2D ECHO  -cycle cardiac enzymes  -cardio Dr. Monterroso following  U/S A/P--> no evidence of renal artery stenosis

## 2021-07-24 NOTE — PROGRESS NOTE ADULT - PROBLEM SELECTOR PLAN 8
-Cr is 2.1 (7/23)--> increased from baseline--> added IVF and continue to monitor  -suspect 2/2 longstanding HTN +/- DM type 2  -will continue home medications for BP  -s/p 1L NS bolus in the ED - hold off further IV fluids  -monitor renal indices, avoid nephrotoxic agents

## 2021-07-24 NOTE — PROGRESS NOTE ADULT - ASSESSMENT
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     SOB/ Hypertension urgency   - Patient with history HTN and CAD presents with SOB x 2 days  - BNP:  <--1995 No meaningful evidence of volume overload, may be elevated in setting of prolonged HTN  - ECHO 11/30/21 showed normal LV and RV  size and systolic function, estimated LVEF of 65%. Mild LVH Mild AS mild MR  - CTA chest negative for PE  - BP: 153/90  - Received Hydralazine 20 mg IV x 1 in ED  -continue labetalol   - continue with Amlodipine 10 mg QD, Hydralazine 100 mg Q8h, doxazosin,   -seen by renal, cozaar dcd for worsening renal function   - Low sodium DASH diet  - Daily weight     Elevated Troponin  - Denies chest pain presently with history CAD s/p stents  - Troponin I: 0.074<--.096, <--.067, <--.068, peaked, likely in the setting of hypertensive urgency   - EKG SR HR @ 70 bpm  - Echo 7/20/21- normal lv ef 60%, mild mr, trace tr   - Continue statin , ASA      - Monitor and replete Lytes. Keep K > 4 and Mg > 2  - All other medical needs as per primary team.  - Other cardiovascular workup will depend on clinical course.  - Will continue to follow.  Carmelina Leger FNP-C  Cardiology NP  [phone]

## 2021-07-24 NOTE — PROGRESS NOTE ADULT - PROBLEM SELECTOR PLAN 2
-pt admits to abdominal pain--> U/S renal, abdominal pelvis and CT abdomen and pelvis  -CT AP--> mild uncomplicated diverticulitis and mild hepatomegaly--> started on cipro and flagyl  -abdominal pain resolved

## 2021-07-25 DIAGNOSIS — N17.9 ACUTE KIDNEY FAILURE, UNSPECIFIED: ICD-10-CM

## 2021-07-25 LAB
ANION GAP SERPL CALC-SCNC: 8 MMOL/L — SIGNIFICANT CHANGE UP (ref 5–17)
BUN SERPL-MCNC: 41 MG/DL — HIGH (ref 7–23)
CALCIUM SERPL-MCNC: 8.6 MG/DL — SIGNIFICANT CHANGE UP (ref 8.5–10.1)
CHLORIDE SERPL-SCNC: 107 MMOL/L — SIGNIFICANT CHANGE UP (ref 96–108)
CO2 SERPL-SCNC: 25 MMOL/L — SIGNIFICANT CHANGE UP (ref 22–31)
CREAT SERPL-MCNC: 2.3 MG/DL — HIGH (ref 0.5–1.3)
GLUCOSE SERPL-MCNC: 106 MG/DL — HIGH (ref 70–99)
POTASSIUM SERPL-MCNC: 3.8 MMOL/L — SIGNIFICANT CHANGE UP (ref 3.5–5.3)
POTASSIUM SERPL-SCNC: 3.8 MMOL/L — SIGNIFICANT CHANGE UP (ref 3.5–5.3)
SODIUM SERPL-SCNC: 140 MMOL/L — SIGNIFICANT CHANGE UP (ref 135–145)

## 2021-07-25 PROCEDURE — 99232 SBSQ HOSP IP/OBS MODERATE 35: CPT

## 2021-07-25 PROCEDURE — 99233 SBSQ HOSP IP/OBS HIGH 50: CPT | Mod: GC

## 2021-07-25 RX ADMIN — Medication 500 MILLIGRAM(S): at 15:28

## 2021-07-25 RX ADMIN — Medication 500 MILLIGRAM(S): at 05:47

## 2021-07-25 RX ADMIN — ENOXAPARIN SODIUM 40 MILLIGRAM(S): 100 INJECTION SUBCUTANEOUS at 11:51

## 2021-07-25 RX ADMIN — Medication 4 MILLIGRAM(S): at 05:49

## 2021-07-25 RX ADMIN — Medication 300 MILLIGRAM(S): at 15:30

## 2021-07-25 RX ADMIN — Medication 500 MILLIGRAM(S): at 05:50

## 2021-07-25 RX ADMIN — Medication 5000 UNIT(S): at 11:50

## 2021-07-25 RX ADMIN — AMLODIPINE BESYLATE 10 MILLIGRAM(S): 2.5 TABLET ORAL at 05:47

## 2021-07-25 RX ADMIN — Medication 100 MILLIGRAM(S): at 21:45

## 2021-07-25 RX ADMIN — Medication 5 MILLIGRAM(S): at 21:46

## 2021-07-25 RX ADMIN — Medication 300 MILLIGRAM(S): at 21:46

## 2021-07-25 RX ADMIN — MIRTAZAPINE 30 MILLIGRAM(S): 45 TABLET, ORALLY DISINTEGRATING ORAL at 21:46

## 2021-07-25 RX ADMIN — Medication 5 MILLIGRAM(S): at 15:28

## 2021-07-25 RX ADMIN — LAMOTRIGINE 100 MILLIGRAM(S): 25 TABLET, ORALLY DISINTEGRATING ORAL at 11:50

## 2021-07-25 RX ADMIN — Medication 2: at 11:51

## 2021-07-25 RX ADMIN — Medication 500 MILLIGRAM(S): at 21:47

## 2021-07-25 RX ADMIN — Medication 145 MILLIGRAM(S): at 11:50

## 2021-07-25 RX ADMIN — ATORVASTATIN CALCIUM 40 MILLIGRAM(S): 80 TABLET, FILM COATED ORAL at 21:44

## 2021-07-25 RX ADMIN — POLYETHYLENE GLYCOL 3350 17 GRAM(S): 17 POWDER, FOR SOLUTION ORAL at 11:51

## 2021-07-25 RX ADMIN — PANTOPRAZOLE SODIUM 40 MILLIGRAM(S): 20 TABLET, DELAYED RELEASE ORAL at 05:52

## 2021-07-25 RX ADMIN — Medication 500 MILLIGRAM(S): at 17:15

## 2021-07-25 RX ADMIN — Medication 4 MILLIGRAM(S): at 17:15

## 2021-07-25 RX ADMIN — Medication 100 MILLIGRAM(S): at 15:28

## 2021-07-25 RX ADMIN — ARIPIPRAZOLE 30 MILLIGRAM(S): 15 TABLET ORAL at 11:51

## 2021-07-25 RX ADMIN — Medication 100 MILLIGRAM(S): at 05:49

## 2021-07-25 RX ADMIN — Medication 25 MILLIGRAM(S): at 21:45

## 2021-07-25 RX ADMIN — Medication 81 MILLIGRAM(S): at 11:50

## 2021-07-25 RX ADMIN — Medication 150 MILLIGRAM(S): at 11:50

## 2021-07-25 RX ADMIN — Medication 300 MILLIGRAM(S): at 05:47

## 2021-07-25 RX ADMIN — Medication 5 MILLIGRAM(S): at 05:52

## 2021-07-25 NOTE — PROGRESS NOTE ADULT - PROBLEM SELECTOR PLAN 3
-Suspect likely demand in setting of prolonged HTN, no signs of acute ischemia  -trops peaked at 0.096 on 7/20  -Continue aspirin and statin -pt admits to abdominal pain--> U/S renal, abdominal pelvis and CT abdomen and pelvis  -CT AP--> mild uncomplicated diverticulitis and mild hepatomegaly--> continue cipro and flagyl  -abdominal pain improving

## 2021-07-25 NOTE — PROGRESS NOTE ADULT - SUBJECTIVE AND OBJECTIVE BOX
[facility] -- Fifi Bliss, Kalpesh Valdovinos, Abraham Sheridan Savella  Office # [phone]      Follow Up:  htn    Subjective/Observations:   No events overnight resting comfortably in bed.  No complaints of chest pain, dyspnea, or palpitations reported. No signs of orthopnea or PND.      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  Had surgery ( April 2013 )    S/P tonsillectomy    S/P arthroscopic knee surgery  Bilateral ( 2005 )    Torsion of testicle  Had surgery at age 14    Pilonidal cyst  Had surgery ( 1969 )    S/P cataract surgery  Bilateral    H/O hernia repair        MEDICATIONS  (STANDING):  amLODIPine   Tablet 10 milliGRAM(s) Oral daily  ARIPiprazole 30 milliGRAM(s) Oral daily  aspirin enteric coated 81 milliGRAM(s) Oral daily  atorvastatin 40 milliGRAM(s) Oral at bedtime  cholecalciferol 5000 Unit(s) Oral daily  ciprofloxacin     Tablet 500 milliGRAM(s) Oral every 12 hours  dextrose 40% Gel 15 Gram(s) Oral once  dextrose 5%. 1000 milliLiter(s) (50 mL/Hr) IV Continuous <Continuous>  dextrose 5%. 1000 milliLiter(s) (100 mL/Hr) IV Continuous <Continuous>  dextrose 50% Injectable 25 Gram(s) IV Push once  dextrose 50% Injectable 12.5 Gram(s) IV Push once  dextrose 50% Injectable 25 Gram(s) IV Push once  doxazosin 4 milliGRAM(s) Oral <User Schedule>  doxepin 25 milliGRAM(s) Oral at bedtime  enoxaparin Injectable 40 milliGRAM(s) SubCutaneous daily  fenofibrate Tablet 145 milliGRAM(s) Oral daily  glucagon  Injectable 1 milliGRAM(s) IntraMuscular once  hydrALAZINE 100 milliGRAM(s) Oral three times a day  insulin lispro (ADMELOG) corrective regimen sliding scale   SubCutaneous three times a day before meals  insulin lispro (ADMELOG) corrective regimen sliding scale   SubCutaneous at bedtime  labetalol 300 milliGRAM(s) Oral three times a day  lamoTRIgine 100 milliGRAM(s) Oral daily  metroNIDAZOLE    Tablet 500 milliGRAM(s) Oral every 8 hours  mirtazapine 30 milliGRAM(s) Oral at bedtime  oxybutynin 5 milliGRAM(s) Oral three times a day  pantoprazole    Tablet 40 milliGRAM(s) Oral before breakfast  polyethylene glycol 3350 17 Gram(s) Oral daily  venlafaxine XR. 150 milliGRAM(s) Oral daily    MEDICATIONS  (PRN):  acetaminophen   Tablet .. 650 milliGRAM(s) Oral every 6 hours PRN Temp greater or equal to 38.5C (101.3F), Mild Pain (1 - 3)  melatonin 3 milliGRAM(s) Oral at bedtime PRN Insomnia      Allergies    No Known Allergies    Intolerances        Vital Signs Last 24 Hrs  T(C): 37.2 (25 Jul 2021 04:40), Max: 37.2 (25 Jul 2021 04:40)  T(F): 98.9 (25 Jul 2021 04:40), Max: 98.9 (25 Jul 2021 04:40)  HR: 62 (25 Jul 2021 04:40) (54 - 65)  BP: 162/77 (25 Jul 2021 04:40) (144/81 - 167/77)  BP(mean): --  RR: 19 (25 Jul 2021 04:40) (18 - 19)  SpO2: 95% (25 Jul 2021 04:40) (95% - 96%)    I&O's Summary        PHYSICAL EXAM:  TELE: sb   Constitutional: NAD, awake and alert, obese   HEENT: Moist Mucous Membranes, Anicteric  Pulmonary: Non-labored, breath sounds are clear bilaterally, No wheezing, crackles or rhonchi  Cardiovascular: Regular, S1 and S2 nl, No murmurs, rubs, gallops or clicks  Gastrointestinal: Bowel Sounds present, soft, nontender.   Lymph: No lymphadenopathy. No peripheral edema.  Skin: No visible rashes or ulcers.  Psych:  Mood & affect appropriate    LABS: All Labs Reviewed:                        9.5    6.57  )-----------( 269      ( 24 Jul 2021 07:41 )             31.2                         9.2    6.84  )-----------( 258      ( 23 Jul 2021 07:23 )             30.0     25 Jul 2021 06:55    140    |  107    |  41     ----------------------------<  106    3.8     |  25     |  2.30   24 Jul 2021 07:41    141    |  108    |  37     ----------------------------<  104    3.8     |  26     |  1.90   23 Jul 2021 07:23    141    |  108    |  41     ----------------------------<  90     3.8     |  25     |  2.10     Ca    8.6        25 Jul 2021 06:55  Ca    8.5        24 Jul 2021 07:41  Ca    8.6        23 Jul 2021 07:23      12 Lead ECG:   Ventricular Rate 73 BPM  Atrial Rate 73 BPM  P-R Interval 256 ms  QRS Duration 114 ms  Q-T Interval 428 ms  QTC Calculation(Bazett) 471 ms  R Axis -14 degrees  T Axis 33 degrees  Diagnosis Line Sinus rhythm with 1st degree AV block  Nonspecific T wave abnormality  Abnormal ECG    Confirmed by lynne Monterroso ([id number]) on 7/19/2021 4:09:03 PM (07-19-21 @ 14:18)    < from: US Duplex Venous Lower Ext Complete, Bilateral (07.20.21 @ 01:52) >  MPRESSION:  No evidence of deep venous thrombosis in either lower extremity.    Right popliteal fossa Baker's cyst.    < from: TTE Echo Complete w/o Contrast w/ Doppler (07.20.21 @ 08:06) >    IMPRESSION:  Normal left ventricular systolic function, estimated LVEF of 60%.  Grossly normal RV size and systolic function.  Left atrial enlargement  Calcified trileaflet aortic valve with mild aortic stenosis, without AI.  Mild MR  Trace TR.  No significant pericardial effusion.

## 2021-07-25 NOTE — PROGRESS NOTE ADULT - ASSESSMENT
76 male with a history of HTN, CAD, CHF, HLD, DM. PVD, SLE, Hepatitis C, depression and CKD now with admitted for accelerated HTN and possible ACS. S/P IV Contrast dye and was on metformin as out patient.   Renal indices are worsening today and is now off of ARB.   BP is better controlled with increasing doses of Labetalol.   Advised to follow up with Dr. Rodarte as Out Patient.   BP management as per Cards.

## 2021-07-25 NOTE — PROGRESS NOTE ADULT - PROBLEM SELECTOR PLAN 7
-DM Type 2 not on insulin  -hold home metformin  -continue sliding scale  -monitor blood glucose, hypoglycemia protocol  -check A1C in AM- 6.1 -stable, was admitted in the past for GI bleed  -no evidence of bleeding at present  -iron studies consistent with iron deficiency anemia - will check FOBT  -ferritin of 11 - s/p venofer x3  -vit b12 and folate WNL  -monitor H/H  -will continue with lovenox for now

## 2021-07-25 NOTE — PROGRESS NOTE ADULT - PROBLEM SELECTOR PLAN 6
-stable, was admitted in the past for GI bleed  -no evidence of bleeding at present  -iron studies consistent with iron deficiency anemia - will check FOBT  -ferritin of 11 - s/p venofer x3  -vit b12 and folate WNL  -monitor H/H  -will continue with lovenox for now -with insomnia  -continue home lamictal, buspar, effexor, remeron and doxepin

## 2021-07-25 NOTE — PROGRESS NOTE ADULT - ASSESSMENT
79 y/o M with PMH of NIDDM2, CAD s/p stents 4 years ago (on plavix), mild AS, Lupus, HTN, HepC, HLD, diverticulosis, Hx of blood clots in brain (s/p surgery 2013) presented to the ED with complaints of elevated BP x 2 months and SOB.     SOB/ Hypertension urgency   - Patient with history HTN and CAD presents with SOB x 2 days  - BNP:  <--1995 No meaningful evidence of volume overload, may be elevated in setting of prolonged HTN  - ECHO 11/30/21 showed normal LV and RV  size and systolic function, estimated LVEF of 65%. Mild LVH Mild AS mild MR  - CTA chest negative for PE  - BP: 162/77  -continue labetalol now increased to 300mg yesterday   - continue with Amlodipine 10 mg QD, Hydralazine 100 mg Q8h,   -seen by renal, cozaar dcd for worsening renal function   - Low sodium DASH diet  - Daily weight     Elevated Troponin  - Denies chest pain presently with history CAD s/p stents  - Troponin I: 0.074<--.096, <--.067, <--.068, peaked, likely in the setting of hypertensive urgency   - EKG SR HR @ 70 bpm  - Echo 7/20/21- normal lv ef 60%, mild mr, trace tr   - Continue statin , ASA   -can dc tele monitoring    id  abx as per primary     awaiting dc to group home     - Monitor and replete Lytes. Keep K > 4 and Mg > 2  - All other medical needs as per primary team.  - Other cardiovascular workup will depend on clinical course.  - Will continue to follow.  Carmelina Leger FNP-C  Cardiology NP  [phone]

## 2021-07-25 NOTE — PROGRESS NOTE ADULT - PROBLEM SELECTOR PLAN 2
-pt admits to abdominal pain--> U/S renal, abdominal pelvis and CT abdomen and pelvis  -CT AP--> mild uncomplicated diverticulitis and mild hepatomegaly--> continue cipro and flagyl  -abdominal pain improving BUN/Cr 41/2.30, baseline cr of 1.60   -now uptrending cr from 1.90 to 2.30  -d/c losartan and avoid nephrotoxic meds  -continue to trend renal indices  -Nephrology following

## 2021-07-25 NOTE — PROGRESS NOTE ADULT - PROBLEM SELECTOR PLAN 4
-s/p PCI with stents  -taken off Plavix and statin  -continue home fenofibrate  -Continue aspirin 81mg daily and atorvastatin 40mg daily -Suspect likely demand in setting of prolonged HTN, no signs of acute ischemia  -trops peaked at 0.096 on 7/20  -Continue aspirin and statin

## 2021-07-25 NOTE — PROGRESS NOTE ADULT - SUBJECTIVE AND OBJECTIVE BOX
ELLIOT TRIPP  76y  Male    Patient is a 76y old  Male who presents with a chief complaint of dyspnea, uncontrolled HTN, elevated troponin (25 Jul 2021 09:50)    out of bed to chair.     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  Had surgery ( April 2013 )    S/P tonsillectomy    S/P arthroscopic knee surgery  Bilateral ( 2005 )    Torsion of testicle  Had surgery at age 14    Pilonidal cyst  Had surgery ( 1969 )    S/P cataract surgery  Bilateral    H/O hernia repair            PHYSICAL EXAM:    T(C): 37.2 (07-25-21 @ 04:40), Max: 37.2 (07-25-21 @ 04:40)  HR: 62 (07-25-21 @ 04:40) (54 - 65)  BP: 162/77 (07-25-21 @ 04:40) (144/81 - 167/77)  RR: 19 (07-25-21 @ 04:40) (19 - 19)  SpO2: 95% (07-25-21 @ 04:40) (95% - 95%)  Wt(kg): --    I&O's Detail      Respiratory: clear anteriorly, decreased BS at bases  Cardiovascular: S1 S2  Gastrointestinal: soft NT ND +BS  Extremities: edema   Neuro: Awake and alert    MEDICATIONS  (STANDING):  amLODIPine   Tablet 10 milliGRAM(s) Oral daily  ARIPiprazole 30 milliGRAM(s) Oral daily  aspirin enteric coated 81 milliGRAM(s) Oral daily  atorvastatin 40 milliGRAM(s) Oral at bedtime  cholecalciferol 5000 Unit(s) Oral daily  ciprofloxacin     Tablet 500 milliGRAM(s) Oral every 12 hours  dextrose 40% Gel 15 Gram(s) Oral once  dextrose 5%. 1000 milliLiter(s) (50 mL/Hr) IV Continuous <Continuous>  dextrose 5%. 1000 milliLiter(s) (100 mL/Hr) IV Continuous <Continuous>  dextrose 50% Injectable 25 Gram(s) IV Push once  dextrose 50% Injectable 12.5 Gram(s) IV Push once  dextrose 50% Injectable 25 Gram(s) IV Push once  doxazosin 4 milliGRAM(s) Oral <User Schedule>  doxepin 25 milliGRAM(s) Oral at bedtime  enoxaparin Injectable 40 milliGRAM(s) SubCutaneous daily  fenofibrate Tablet 145 milliGRAM(s) Oral daily  glucagon  Injectable 1 milliGRAM(s) IntraMuscular once  hydrALAZINE 100 milliGRAM(s) Oral three times a day  insulin lispro (ADMELOG) corrective regimen sliding scale   SubCutaneous three times a day before meals  insulin lispro (ADMELOG) corrective regimen sliding scale   SubCutaneous at bedtime  labetalol 300 milliGRAM(s) Oral three times a day  lamoTRIgine 100 milliGRAM(s) Oral daily  metroNIDAZOLE    Tablet 500 milliGRAM(s) Oral every 8 hours  mirtazapine 30 milliGRAM(s) Oral at bedtime  oxybutynin 5 milliGRAM(s) Oral three times a day  pantoprazole    Tablet 40 milliGRAM(s) Oral before breakfast  polyethylene glycol 3350 17 Gram(s) Oral daily  venlafaxine XR. 150 milliGRAM(s) Oral daily    MEDICATIONS  (PRN):  acetaminophen   Tablet .. 650 milliGRAM(s) Oral every 6 hours PRN Temp greater or equal to 38.5C (101.3F), Mild Pain (1 - 3)  melatonin 3 milliGRAM(s) Oral at bedtime PRN Insomnia                            9.5    6.57  )-----------( 269      ( 24 Jul 2021 07:41 )             31.2       07-25    140  |  107  |  41<H>  ----------------------------<  106<H>  3.8   |  25  |  2.30<H>    Ca    8.6      25 Jul 2021 06:55        Creatinine Trend: Creatinine Trend: 2.30<--, 1.90<--, 2.10<--, 1.80<--, 1.60<--, 1.60<--

## 2021-07-25 NOTE — PROGRESS NOTE ADULT - SUBJECTIVE AND OBJECTIVE BOX
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 SOB began 2 days while patient was lying in bed. States that he felt like he was having difficulty catching his breath and has been constant since onset. SOB not worse with exertion and has no other aggravating or alleviating factors. Has been having associated sharp jabbing pain in the lower rib areas. Pain is mild, non-radiating, gradual in onset, has no aggravating/alleviating factors, and resolves spontaneously in 30 min. Had one episode of diffuse, cramping abdominal pain yesterday evening and nausea that resolved spontaneously. Also, endorses calf soreness but states that it is not really calf pain. Of note, pt has history of HTN, does not follow with cardiologist. States that he checks his BP daily and for the past two months it has been severely elevated with SBP in the 200s. Denies having any fever, chills, cough, cold-like symptoms, headache, visual changes, chest pain, diarrhea, or dysuria. Has no sick contacts.     In the ED,  Vital Signs T(F) Max: 99.1 BP initially 186/104 (173//93) HR: 66 RR: 18 SpO2: 98%  Labs significant for: H/H 9.9/32.6, D-dimer 486, BUN/Cr 27/1.60, trop 0.068, proBNP 1995  CXR: no acute infiltrate on personal read  CT Angio Chest with IV contrast: Patent central airways. Dependent and basilar atelectasis. Otherwise clear lungs. Negative for pulmonary embolism.  EKG: SR with 1st degree AV block at 73bpm, nonspecific twave abn (19 Jul 2021 22:10)      ----  INTERVAL HPI/OVERNIGHT EVENTS: Pt seen and evaluated at the bedside. No acute overnight events occurred. Patient states he is feeling well overall but has some mild LLQ abdominal pain. Denies any nausea, vomiting, or blood in the stool. Denies any headaches, dizziness, SOB or chest pain     ----  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  Had surgery ( April 2013 )    S/P tonsillectomy    S/P arthroscopic knee surgery  Bilateral ( 2005 )    Torsion of testicle  Had surgery at age 14    Pilonidal cyst  Had surgery ( 1969 )    S/P cataract surgery  Bilateral    H/O hernia repair        FAMILY HISTORY:  FHx: throat cancer    No family history of colorectal cancer        Home Medications:  amLODIPine 10 mg oral tablet: 1 tab(s) orally once a day (20 Jul 2021 00:24)  ARIPiprazole 30 mg oral tablet: 1 tab(s) orally once a day (20 Jul 2021 00:24)  ARISTADA     INJ 1064MG: INJECT 3.9ML INTRAMUSCULARLY AS DIRECTED (20 Jul 2021 00:24)  DOXAZOSIN 4MG TAB: TAKE 1 TABLET TWICE A DAY (FOR PROSTATE) (20 Jul 2021 00:24)  DOXEPIN 25MG CAP: TAKE ONE CAPSULE AT BEDTIME (20 Jul 2021 00:24)  FENOFIBRATE  TAB 145MG: TAKE ONE TABLET DAILY (FOR CHOLESTEROL) (20 Jul 2021 00:24)  hydrALAZINE 100 mg oral tablet: 1 tab(s) orally 3 times a day (20 Jul 2021 00:24)  labetalol 100 mg oral tablet: 1 tab(s) orally once a day (20 Jul 2021 00:24)  LAMOTRIGINE 100MG TA: TAKE ONE TABLET DAILY (20 Jul 2021 00:24)  meloxicam 7.5 mg oral tablet: 1 tab(s) orally 2 times a day (20 Jul 2021 00:24)  metFORMIN 500 mg oral tablet: 1 tab(s) orally once a day (20 Jul 2021 00:24)  MIRTAZAPINE  TAB 30MG: TAKE 1 TABLET AT BEDTIME (20 Jul 2021 00:24)  OMEPRAZOLE 40MG CAP: TAKE 1 CAPSULE DAILY (FOR STOMACH) (20 Jul 2021 00:24)  OXYBUTYNIN ER 15MG TAB: TAKE ONE TABLET DAILY (20 Jul 2021 00:24)  VASCEPA 1GM CAP: 2 cap(s) orally 2 times a day (20 Jul 2021 00:24)  VENLAFAXINE XR 150MG CAPS: TAKE 1 CAPSULE DAILY (20 Jul 2021 00:24)  VITAMIN D 1000UNIT (M) TAB: TAKE 5 TABLETS DAILY (20 Jul 2021 00:24)      Allergies    No Known Allergies    Intolerances        ----  MEDICATIONS  (STANDING):  amLODIPine   Tablet 10 milliGRAM(s) Oral daily  ARIPiprazole 30 milliGRAM(s) Oral daily  aspirin enteric coated 81 milliGRAM(s) Oral daily  atorvastatin 40 milliGRAM(s) Oral at bedtime  cholecalciferol 5000 Unit(s) Oral daily  ciprofloxacin     Tablet 500 milliGRAM(s) Oral every 12 hours  dextrose 40% Gel 15 Gram(s) Oral once  dextrose 5%. 1000 milliLiter(s) (50 mL/Hr) IV Continuous <Continuous>  dextrose 5%. 1000 milliLiter(s) (100 mL/Hr) IV Continuous <Continuous>  dextrose 50% Injectable 25 Gram(s) IV Push once  dextrose 50% Injectable 12.5 Gram(s) IV Push once  dextrose 50% Injectable 25 Gram(s) IV Push once  doxazosin 4 milliGRAM(s) Oral <User Schedule>  doxepin 25 milliGRAM(s) Oral at bedtime  enoxaparin Injectable 40 milliGRAM(s) SubCutaneous daily  fenofibrate Tablet 145 milliGRAM(s) Oral daily  glucagon  Injectable 1 milliGRAM(s) IntraMuscular once  hydrALAZINE 100 milliGRAM(s) Oral three times a day  insulin lispro (ADMELOG) corrective regimen sliding scale   SubCutaneous three times a day before meals  insulin lispro (ADMELOG) corrective regimen sliding scale   SubCutaneous at bedtime  labetalol 300 milliGRAM(s) Oral three times a day  lamoTRIgine 100 milliGRAM(s) Oral daily  metroNIDAZOLE    Tablet 500 milliGRAM(s) Oral every 8 hours  mirtazapine 30 milliGRAM(s) Oral at bedtime  oxybutynin 5 milliGRAM(s) Oral three times a day  pantoprazole    Tablet 40 milliGRAM(s) Oral before breakfast  polyethylene glycol 3350 17 Gram(s) Oral daily  venlafaxine XR. 150 milliGRAM(s) Oral daily    MEDICATIONS  (PRN):  acetaminophen   Tablet .. 650 milliGRAM(s) Oral every 6 hours PRN Temp greater or equal to 38.5C (101.3F), Mild Pain (1 - 3)  melatonin 3 milliGRAM(s) Oral at bedtime PRN Insomnia      ----  REVIEW OF SYSTEMS:  Constitutional: denies fever, chills  HEENT: denies headache, dizziness, or lightheadedness  Respiratory: denies SOB, cough, or wheezing  Cardiovascular: denies CP, palpitations  Gastrointestinal: admits LLQ abdominal pain, denies nausea, vomiting, diarrhea, constipation,  or bloody stools  Genitourinary: denies painful urination, increased frequency, urgency, or bloody urine  Skin/Breast: denies rashes or itching  Musculoskeletal: denies muscle aches, joint swelling, or muscle weakness  Neurologic: denies loss of sensation, numbness, or tingling    ----  PHYSICAL EXAM:  Gen: well appearing, NAD  HEENT: NCAT, PEERLA b/l, EOMI b/l, no conjunctival erythema  Cardio: regular rate and rhythm, +s1s2, no murmurs, rubs, or gallops  Pulm: CTA b/l, no wheezes, rales or rhonchi  Abdomen: soft, mildly tender to palpation of LLQ nondistended, +BS x4 quadrants, no guarding  Extremities: no clubbing, cyanosis or edema, +2 pedal pulses  Neuro: AAOx3  Skin: warm and dry      T(C): 37.2 (07-25-21 @ 04:40), Max: 37.2 (07-25-21 @ 04:40)  HR: 62 (07-25-21 @ 04:40) (54 - 65)  BP: 162/77 (07-25-21 @ 04:40) (144/81 - 167/77)  RR: 19 (07-25-21 @ 04:40) (18 - 19)  SpO2: 95% (07-25-21 @ 04:40) (95% - 96%)  Wt(kg): --    ----  I&O's Summary      LABS:                        9.5    6.57  )-----------( 269      ( 24 Jul 2021 07:41 )             31.2     07-25    140  |  107  |  41<H>  ----------------------------<  106<H>  3.8   |  25  |  2.30<H>    Ca    8.6      25 Jul 2021 06:55          CAPILLARY BLOOD GLUCOSE      POCT Blood Glucose.: 130 mg/dL (25 Jul 2021 08:10)  POCT Blood Glucose.: 146 mg/dL (24 Jul 2021 21:10)  POCT Blood Glucose.: 95 mg/dL (24 Jul 2021 16:35)  POCT Blood Glucose.: 107 mg/dL (24 Jul 2021 12:14)

## 2021-07-25 NOTE — PROGRESS NOTE ADULT - PROBLEM SELECTOR PLAN 1
admitted with Hypertensive urgency with BPs in 200s  -continue 300mg labetalol TID, hydralazine 100mg TID, and amlodipine 10mg daily  -d/c losartan in setting of uptrending renal indices  -TTE: EF 60%, left artial enlargement, mild AS  -U/S A/P--> no evidence of renal artery stenosis  -cardio Dr. Monterroso following

## 2021-07-26 LAB
ANION GAP SERPL CALC-SCNC: 7 MMOL/L — SIGNIFICANT CHANGE UP (ref 5–17)
BASOPHILS # BLD AUTO: 0.04 K/UL — SIGNIFICANT CHANGE UP (ref 0–0.2)
BASOPHILS NFR BLD AUTO: 0.5 % — SIGNIFICANT CHANGE UP (ref 0–2)
BUN SERPL-MCNC: 43 MG/DL — HIGH (ref 7–23)
CALCIUM SERPL-MCNC: 8.5 MG/DL — SIGNIFICANT CHANGE UP (ref 8.5–10.1)
CHLORIDE SERPL-SCNC: 108 MMOL/L — SIGNIFICANT CHANGE UP (ref 96–108)
CO2 SERPL-SCNC: 25 MMOL/L — SIGNIFICANT CHANGE UP (ref 22–31)
CREAT SERPL-MCNC: 2.3 MG/DL — HIGH (ref 0.5–1.3)
EOSINOPHIL # BLD AUTO: 0.22 K/UL — SIGNIFICANT CHANGE UP (ref 0–0.5)
EOSINOPHIL NFR BLD AUTO: 2.8 % — SIGNIFICANT CHANGE UP (ref 0–6)
GLUCOSE SERPL-MCNC: 116 MG/DL — HIGH (ref 70–99)
HCT VFR BLD CALC: 28.5 % — LOW (ref 39–50)
HGB BLD-MCNC: 8.7 G/DL — LOW (ref 13–17)
IMM GRANULOCYTES NFR BLD AUTO: 0.4 % — SIGNIFICANT CHANGE UP (ref 0–1.5)
LYMPHOCYTES # BLD AUTO: 1.73 K/UL — SIGNIFICANT CHANGE UP (ref 1–3.3)
LYMPHOCYTES # BLD AUTO: 22.2 % — SIGNIFICANT CHANGE UP (ref 13–44)
MCHC RBC-ENTMCNC: 25.3 PG — LOW (ref 27–34)
MCHC RBC-ENTMCNC: 30.5 GM/DL — LOW (ref 32–36)
MCV RBC AUTO: 82.8 FL — SIGNIFICANT CHANGE UP (ref 80–100)
MONOCYTES # BLD AUTO: 0.62 K/UL — SIGNIFICANT CHANGE UP (ref 0–0.9)
MONOCYTES NFR BLD AUTO: 7.9 % — SIGNIFICANT CHANGE UP (ref 2–14)
NEUTROPHILS # BLD AUTO: 5.16 K/UL — SIGNIFICANT CHANGE UP (ref 1.8–7.4)
NEUTROPHILS NFR BLD AUTO: 66.2 % — SIGNIFICANT CHANGE UP (ref 43–77)
NRBC # BLD: 0 /100 WBCS — SIGNIFICANT CHANGE UP (ref 0–0)
PLATELET # BLD AUTO: 250 K/UL — SIGNIFICANT CHANGE UP (ref 150–400)
POTASSIUM SERPL-MCNC: 3.7 MMOL/L — SIGNIFICANT CHANGE UP (ref 3.5–5.3)
POTASSIUM SERPL-SCNC: 3.7 MMOL/L — SIGNIFICANT CHANGE UP (ref 3.5–5.3)
RBC # BLD: 3.44 M/UL — LOW (ref 4.2–5.8)
RBC # FLD: 17.8 % — HIGH (ref 10.3–14.5)
SODIUM SERPL-SCNC: 140 MMOL/L — SIGNIFICANT CHANGE UP (ref 135–145)
WBC # BLD: 7.8 K/UL — SIGNIFICANT CHANGE UP (ref 3.8–10.5)
WBC # FLD AUTO: 7.8 K/UL — SIGNIFICANT CHANGE UP (ref 3.8–10.5)

## 2021-07-26 PROCEDURE — 99233 SBSQ HOSP IP/OBS HIGH 50: CPT

## 2021-07-26 PROCEDURE — 99232 SBSQ HOSP IP/OBS MODERATE 35: CPT | Mod: GC

## 2021-07-26 PROCEDURE — 99232 SBSQ HOSP IP/OBS MODERATE 35: CPT

## 2021-07-26 RX ORDER — FUROSEMIDE 40 MG
40 TABLET ORAL ONCE
Refills: 0 | Status: COMPLETED | OUTPATIENT
Start: 2021-07-26 | End: 2021-07-26

## 2021-07-26 RX ADMIN — LAMOTRIGINE 100 MILLIGRAM(S): 25 TABLET, ORALLY DISINTEGRATING ORAL at 11:59

## 2021-07-26 RX ADMIN — Medication 5000 UNIT(S): at 11:56

## 2021-07-26 RX ADMIN — ENOXAPARIN SODIUM 40 MILLIGRAM(S): 100 INJECTION SUBCUTANEOUS at 11:57

## 2021-07-26 RX ADMIN — Medication 150 MILLIGRAM(S): at 11:59

## 2021-07-26 RX ADMIN — Medication 145 MILLIGRAM(S): at 11:57

## 2021-07-26 RX ADMIN — ARIPIPRAZOLE 30 MILLIGRAM(S): 15 TABLET ORAL at 11:56

## 2021-07-26 RX ADMIN — Medication 5 MILLIGRAM(S): at 06:19

## 2021-07-26 RX ADMIN — Medication 650 MILLIGRAM(S): at 20:03

## 2021-07-26 RX ADMIN — Medication 81 MILLIGRAM(S): at 11:56

## 2021-07-26 RX ADMIN — Medication 3 MILLIGRAM(S): at 21:53

## 2021-07-26 RX ADMIN — Medication 500 MILLIGRAM(S): at 06:19

## 2021-07-26 RX ADMIN — Medication 100 MILLIGRAM(S): at 21:53

## 2021-07-26 RX ADMIN — Medication 300 MILLIGRAM(S): at 21:54

## 2021-07-26 RX ADMIN — Medication 5 MILLIGRAM(S): at 21:53

## 2021-07-26 RX ADMIN — Medication 0.1 MILLIGRAM(S): at 21:53

## 2021-07-26 RX ADMIN — Medication 500 MILLIGRAM(S): at 06:20

## 2021-07-26 RX ADMIN — Medication 100 MILLIGRAM(S): at 12:11

## 2021-07-26 RX ADMIN — POLYETHYLENE GLYCOL 3350 17 GRAM(S): 17 POWDER, FOR SOLUTION ORAL at 11:58

## 2021-07-26 RX ADMIN — ATORVASTATIN CALCIUM 40 MILLIGRAM(S): 80 TABLET, FILM COATED ORAL at 21:53

## 2021-07-26 RX ADMIN — Medication 100 MILLIGRAM(S): at 06:19

## 2021-07-26 RX ADMIN — Medication 25 MILLIGRAM(S): at 21:53

## 2021-07-26 RX ADMIN — Medication 300 MILLIGRAM(S): at 12:10

## 2021-07-26 RX ADMIN — Medication 0.1 MILLIGRAM(S): at 13:46

## 2021-07-26 RX ADMIN — PANTOPRAZOLE SODIUM 40 MILLIGRAM(S): 20 TABLET, DELAYED RELEASE ORAL at 06:19

## 2021-07-26 RX ADMIN — Medication 40 MILLIGRAM(S): at 13:46

## 2021-07-26 RX ADMIN — AMLODIPINE BESYLATE 10 MILLIGRAM(S): 2.5 TABLET ORAL at 06:19

## 2021-07-26 RX ADMIN — Medication 4 MILLIGRAM(S): at 06:19

## 2021-07-26 RX ADMIN — Medication 500 MILLIGRAM(S): at 12:10

## 2021-07-26 RX ADMIN — Medication 500 MILLIGRAM(S): at 21:53

## 2021-07-26 RX ADMIN — Medication 500 MILLIGRAM(S): at 17:28

## 2021-07-26 RX ADMIN — Medication 4 MILLIGRAM(S): at 17:28

## 2021-07-26 RX ADMIN — Medication 650 MILLIGRAM(S): at 18:55

## 2021-07-26 RX ADMIN — Medication 300 MILLIGRAM(S): at 06:19

## 2021-07-26 RX ADMIN — Medication 5 MILLIGRAM(S): at 12:10

## 2021-07-26 RX ADMIN — MIRTAZAPINE 30 MILLIGRAM(S): 45 TABLET, ORALLY DISINTEGRATING ORAL at 21:53

## 2021-07-26 NOTE — PROGRESS NOTE ADULT - PROBLEM SELECTOR PLAN 9
-Cr is 2.1 (7/23)--> increased from baseline--> added IVF and continue to monitor  -suspect 2/2 longstanding HTN +/- DM type 2  -will continue home medications for BP  -s/p 1L NS bolus in the ED - hold off further IV fluids  -monitor renal indices, avoid nephrotoxic agents -continue home cardura  -on oxybutnin 15mg ER at home - will c/w oxybutnin 5mg TID  -f/u OP with urology

## 2021-07-26 NOTE — DIETITIAN INITIAL EVALUATION ADULT. - PROBLEM SELECTOR PLAN 2
suspect likely demand in setting or prolonged HTN  will trend CE's to peak and follow up echocardiogram  start aspirin and statin

## 2021-07-26 NOTE — DIETITIAN INITIAL EVALUATION ADULT. - PROBLEM SELECTOR PLAN 1
admit to telemetry  patient's BP on arrival was 186/104, s/p IV hydralazine  will continue labetalol, hydralazine and amlodipine  his symptoms of dyspnea are likely related to uncontrolled HTN  to check 2D ECHO and cycle cardiac enzymes  cardio Dr. Monterroso following, recs appreciated

## 2021-07-26 NOTE — PROGRESS NOTE ADULT - SUBJECTIVE AND OBJECTIVE BOX
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 SOB began 2 days while patient was lying in bed. States that he felt like he was having difficulty catching his breath and has been constant since onset. SOB not worse with exertion and has no other aggravating or alleviating factors. Has been having associated sharp jabbing pain in the lower rib areas. Pain is mild, non-radiating, gradual in onset, has no aggravating/alleviating factors, and resolves spontaneously in 30 min. Had one episode of diffuse, cramping abdominal pain yesterday evening and nausea that resolved spontaneously. Also, endorses calf soreness but states that it is not really calf pain. Of note, pt has history of HTN, does not follow with cardiologist. States that he checks his BP daily and for the past two months it has been severely elevated with SBP in the 200s. Denies having any fever, chills, cough, cold-like symptoms, headache, visual changes, chest pain, diarrhea, or dysuria. Has no sick contacts.     In the ED,  Vital Signs T(F) Max: 99.1 BP initially 186/104 (173//93) HR: 66 RR: 18 SpO2: 98%  Labs significant for: H/H 9.9/32.6, D-dimer 486, BUN/Cr 27/1.60, trop 0.068, proBNP 1995  CXR: no acute infiltrate on personal read  CT Angio Chest with IV contrast: Patent central airways. Dependent and basilar atelectasis. Otherwise clear lungs. Negative for pulmonary embolism.  EKG: SR with 1st degree AV block at 73bpm, nonspecific twave abn (19 Jul 2021 22:10)      ----  INTERVAL HPI/OVERNIGHT EVENTS: Pt seen and evaluated at the bedside. No acute overnight events occurred. Patient reports SOB while laying down, LLQ 3/10 non radiating pain, and RUQ pain on palpation 3/10 nonradiating. Patient denies chest pain, palpitations, cough, n/v/d/c, dysuria, change in urine color.    ----  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  Had surgery ( April 2013 )    S/P tonsillectomy    S/P arthroscopic knee surgery  Bilateral ( 2005 )    Torsion of testicle  Had surgery at age 14    Pilonidal cyst  Had surgery ( 1969 )    S/P cataract surgery  Bilateral    H/O hernia repair        FAMILY HISTORY:  FHx: throat cancer    No family history of colorectal cancer        Home Medications:  amLODIPine 10 mg oral tablet: 1 tab(s) orally once a day (20 Jul 2021 00:24)  ARIPiprazole 30 mg oral tablet: 1 tab(s) orally once a day (20 Jul 2021 00:24)  ARISTADA     INJ 1064MG: INJECT 3.9ML INTRAMUSCULARLY AS DIRECTED (20 Jul 2021 00:24)  DOXAZOSIN 4MG TAB: TAKE 1 TABLET TWICE A DAY (FOR PROSTATE) (20 Jul 2021 00:24)  DOXEPIN 25MG CAP: TAKE ONE CAPSULE AT BEDTIME (20 Jul 2021 00:24)  FENOFIBRATE  TAB 145MG: TAKE ONE TABLET DAILY (FOR CHOLESTEROL) (20 Jul 2021 00:24)  hydrALAZINE 100 mg oral tablet: 1 tab(s) orally 3 times a day (20 Jul 2021 00:24)  labetalol 100 mg oral tablet: 1 tab(s) orally once a day (20 Jul 2021 00:24)  LAMOTRIGINE 100MG TA: TAKE ONE TABLET DAILY (20 Jul 2021 00:24)  meloxicam 7.5 mg oral tablet: 1 tab(s) orally 2 times a day (20 Jul 2021 00:24)  metFORMIN 500 mg oral tablet: 1 tab(s) orally once a day (20 Jul 2021 00:24)  MIRTAZAPINE  TAB 30MG: TAKE 1 TABLET AT BEDTIME (20 Jul 2021 00:24)  OMEPRAZOLE 40MG CAP: TAKE 1 CAPSULE DAILY (FOR STOMACH) (20 Jul 2021 00:24)  OXYBUTYNIN ER 15MG TAB: TAKE ONE TABLET DAILY (20 Jul 2021 00:24)  VASCEPA 1GM CAP: 2 cap(s) orally 2 times a day (20 Jul 2021 00:24)  VENLAFAXINE XR 150MG CAPS: TAKE 1 CAPSULE DAILY (20 Jul 2021 00:24)  VITAMIN D 1000UNIT (M) TAB: TAKE 5 TABLETS DAILY (20 Jul 2021 00:24)      Allergies    No Known Allergies    Intolerances        ----  MEDICATIONS  (STANDING):  amLODIPine   Tablet 10 milliGRAM(s) Oral daily  ARIPiprazole 30 milliGRAM(s) Oral daily  aspirin enteric coated 81 milliGRAM(s) Oral daily  atorvastatin 40 milliGRAM(s) Oral at bedtime  cholecalciferol 5000 Unit(s) Oral daily  ciprofloxacin     Tablet 500 milliGRAM(s) Oral every 12 hours  cloNIDine 0.1 milliGRAM(s) Oral three times a day  dextrose 40% Gel 15 Gram(s) Oral once  dextrose 5%. 1000 milliLiter(s) (50 mL/Hr) IV Continuous <Continuous>  dextrose 5%. 1000 milliLiter(s) (100 mL/Hr) IV Continuous <Continuous>  dextrose 50% Injectable 25 Gram(s) IV Push once  dextrose 50% Injectable 12.5 Gram(s) IV Push once  dextrose 50% Injectable 25 Gram(s) IV Push once  doxazosin 4 milliGRAM(s) Oral <User Schedule>  doxepin 25 milliGRAM(s) Oral at bedtime  enoxaparin Injectable 40 milliGRAM(s) SubCutaneous daily  fenofibrate Tablet 145 milliGRAM(s) Oral daily  glucagon  Injectable 1 milliGRAM(s) IntraMuscular once  hydrALAZINE 100 milliGRAM(s) Oral three times a day  insulin lispro (ADMELOG) corrective regimen sliding scale   SubCutaneous three times a day before meals  insulin lispro (ADMELOG) corrective regimen sliding scale   SubCutaneous at bedtime  labetalol 300 milliGRAM(s) Oral three times a day  lamoTRIgine 100 milliGRAM(s) Oral daily  metroNIDAZOLE    Tablet 500 milliGRAM(s) Oral every 8 hours  mirtazapine 30 milliGRAM(s) Oral at bedtime  oxybutynin 5 milliGRAM(s) Oral three times a day  pantoprazole    Tablet 40 milliGRAM(s) Oral before breakfast  polyethylene glycol 3350 17 Gram(s) Oral daily  venlafaxine XR. 150 milliGRAM(s) Oral daily    MEDICATIONS  (PRN):  acetaminophen   Tablet .. 650 milliGRAM(s) Oral every 6 hours PRN Temp greater or equal to 38.5C (101.3F), Mild Pain (1 - 3)  melatonin 3 milliGRAM(s) Oral at bedtime PRN Insomnia      ----  REVIEW OF SYSTEMS:  Constitutional: denies fever, chills  HEENT: denies headache, dizziness, or lightheadedness  Respiratory: denies cough, or wheezing  Cardiovascular: denies CP, palpitations  Gastrointestinal: denies nausea, vomiting, diarrhea, constipation, or bloody stools  Genitourinary: denies painful urination urgency, or bloody urine  Skin/Breast: denies rashes or itching  Musculoskeletal: denies muscle aches, joint swelling, or muscle weakness  Neurologic: denies loss of sensation, numbness, or tingling    ----  PHYSICAL EXAM:  Gen: well appearing, NAD  HEENT: NCAT, PEERLA b/l, EOMI b/l, no conjunctival erythema  Cardio: regular rate and rhythm, +s1s2, no murmurs, rubs, or gallops.   Pulm: CTA b/l, no wheezes, rales or rhonchi  Abdomen: soft, nontender, nondistended, +BS x4 quadrants, no guarding  Extremities: no clubbing, cyanosis or edema, +2 pedal pulses  Neuro: AAOx3, CNII-XII intact grossly, 5/5 strength all extremities, sensation intact  Skin: warm and dry      T(C): 36.4 (07-26-21 @ 10:51), Max: 36.6 (07-25-21 @ 19:26)  HR: 56 (07-26-21 @ 10:51) (51 - 60)  BP: 147/73 (07-26-21 @ 10:51) (147/73 - 166/87)  RR: 18 (07-26-21 @ 04:20) (18 - 19)  SpO2: 95% (07-26-21 @ 04:20) (93% - 97%)  Wt(kg): --    ----  I&O's Summary      LABS:                        8.7    7.80  )-----------( 250      ( 26 Jul 2021 07:46 )             28.5     07-26    140  |  108  |  43<H>  ----------------------------<  116<H>  3.7   |  25  |  2.30<H>    Ca    8.5      26 Jul 2021 07:46          CAPILLARY BLOOD GLUCOSE      POCT Blood Glucose.: 125 mg/dL (26 Jul 2021 11:22)  POCT Blood Glucose.: 114 mg/dL (26 Jul 2021 07:17)  POCT Blood Glucose.: 138 mg/dL (25 Jul 2021 21:15)  POCT Blood Glucose.: 118 mg/dL (25 Jul 2021 17:23)                ----  Personally reviewed:  Vital sign trends: [  ] yes    [  ] no     [  ] n/a  Laboratory results: [  ] yes    [  ] no     [  ] n/a  Radiology results: [  ] yes    [  ] no     [  ] n/a  Culture results: [  ] yes    [  ] no     [  ] n/a  Consultant recommendations: [  ] yes    [  ] no     [  ] n/a         Patient is a 76y old  Male who presents with a chief complaint of dyspnea, uncontrolled HTN, elevated troponin (26 Jul 2021 11:00)      FROM ADMISSION H+P:   HPI:  75 y/o M with PMHx of Type 2 DM (not on insulin), CAD s/p stents >4 years ago (off plavix), Lupus, HTN, HLD, CKD 3, HepC, diverticulosis, 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 SOB began 2 days while patient was lying in bed. States that he felt like he was having difficulty catching his breath and has been constant since onset. SOB not worse with exertion and has no other aggravating or alleviating factors. Has been having associated sharp jabbing pain in the lower rib areas. Pain is mild, non-radiating, gradual in onset, has no aggravating/alleviating factors, and resolves spontaneously in 30 min. Had one episode of diffuse, cramping abdominal pain yesterday evening and nausea that resolved spontaneously. Also, endorses calf soreness but states that it is not really calf pain. Of note, pt has history of HTN, does not follow with cardiologist. States that he checks his BP daily and for the past two months it has been severely elevated with SBP in the 200s. Denies having any fever, chills, cough, cold-like symptoms, headache, visual changes, chest pain, diarrhea, or dysuria. Has no sick contacts.     In the ED,  Vital Signs T(F) Max: 99.1 BP initially 186/104 (173//93) HR: 66 RR: 18 SpO2: 98%  Labs significant for: H/H 9.9/32.6, D-dimer 486, BUN/Cr 27/1.60, trop 0.068, proBNP 1995  CXR: no acute infiltrate on personal read  CT Angio Chest with IV contrast: Patent central airways. Dependent and basilar atelectasis. Otherwise clear lungs. Negative for pulmonary embolism.  EKG: SR with 1st degree AV block at 73bpm, nonspecific twave abn (19 Jul 2021 22:10)      ----  INTERVAL HPI/OVERNIGHT EVENTS: Pt seen and evaluated at the bedside. No acute overnight events occurred. Patient reports SOB while laying down, LLQ 3/10 non radiating pain, and RUQ pain on palpation 3/10 nonradiating. Patient denies chest pain, palpitations, cough, n/v/d/c, dysuria, change in urine color.    ----  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  Had surgery ( April 2013 )    S/P tonsillectomy    S/P arthroscopic knee surgery  Bilateral ( 2005 )    Torsion of testicle  Had surgery at age 14    Pilonidal cyst  Had surgery ( 1969 )    S/P cataract surgery  Bilateral    H/O hernia repair        FAMILY HISTORY:  FHx: throat cancer    No family history of colorectal cancer        Home Medications:  amLODIPine 10 mg oral tablet: 1 tab(s) orally once a day (20 Jul 2021 00:24)  ARIPiprazole 30 mg oral tablet: 1 tab(s) orally once a day (20 Jul 2021 00:24)  ARISTADA     INJ 1064MG: INJECT 3.9ML INTRAMUSCULARLY AS DIRECTED (20 Jul 2021 00:24)  DOXAZOSIN 4MG TAB: TAKE 1 TABLET TWICE A DAY (FOR PROSTATE) (20 Jul 2021 00:24)  DOXEPIN 25MG CAP: TAKE ONE CAPSULE AT BEDTIME (20 Jul 2021 00:24)  FENOFIBRATE  TAB 145MG: TAKE ONE TABLET DAILY (FOR CHOLESTEROL) (20 Jul 2021 00:24)  hydrALAZINE 100 mg oral tablet: 1 tab(s) orally 3 times a day (20 Jul 2021 00:24)  labetalol 100 mg oral tablet: 1 tab(s) orally once a day (20 Jul 2021 00:24)  LAMOTRIGINE 100MG TA: TAKE ONE TABLET DAILY (20 Jul 2021 00:24)  meloxicam 7.5 mg oral tablet: 1 tab(s) orally 2 times a day (20 Jul 2021 00:24)  metFORMIN 500 mg oral tablet: 1 tab(s) orally once a day (20 Jul 2021 00:24)  MIRTAZAPINE  TAB 30MG: TAKE 1 TABLET AT BEDTIME (20 Jul 2021 00:24)  OMEPRAZOLE 40MG CAP: TAKE 1 CAPSULE DAILY (FOR STOMACH) (20 Jul 2021 00:24)  OXYBUTYNIN ER 15MG TAB: TAKE ONE TABLET DAILY (20 Jul 2021 00:24)  VASCEPA 1GM CAP: 2 cap(s) orally 2 times a day (20 Jul 2021 00:24)  VENLAFAXINE XR 150MG CAPS: TAKE 1 CAPSULE DAILY (20 Jul 2021 00:24)  VITAMIN D 1000UNIT (M) TAB: TAKE 5 TABLETS DAILY (20 Jul 2021 00:24)      Allergies    No Known Allergies    Intolerances        ----  MEDICATIONS  (STANDING):  amLODIPine   Tablet 10 milliGRAM(s) Oral daily  ARIPiprazole 30 milliGRAM(s) Oral daily  aspirin enteric coated 81 milliGRAM(s) Oral daily  atorvastatin 40 milliGRAM(s) Oral at bedtime  cholecalciferol 5000 Unit(s) Oral daily  ciprofloxacin     Tablet 500 milliGRAM(s) Oral every 12 hours  cloNIDine 0.1 milliGRAM(s) Oral three times a day  dextrose 40% Gel 15 Gram(s) Oral once  dextrose 5%. 1000 milliLiter(s) (50 mL/Hr) IV Continuous <Continuous>  dextrose 5%. 1000 milliLiter(s) (100 mL/Hr) IV Continuous <Continuous>  dextrose 50% Injectable 25 Gram(s) IV Push once  dextrose 50% Injectable 12.5 Gram(s) IV Push once  dextrose 50% Injectable 25 Gram(s) IV Push once  doxazosin 4 milliGRAM(s) Oral <User Schedule>  doxepin 25 milliGRAM(s) Oral at bedtime  enoxaparin Injectable 40 milliGRAM(s) SubCutaneous daily  fenofibrate Tablet 145 milliGRAM(s) Oral daily  glucagon  Injectable 1 milliGRAM(s) IntraMuscular once  hydrALAZINE 100 milliGRAM(s) Oral three times a day  insulin lispro (ADMELOG) corrective regimen sliding scale   SubCutaneous three times a day before meals  insulin lispro (ADMELOG) corrective regimen sliding scale   SubCutaneous at bedtime  labetalol 300 milliGRAM(s) Oral three times a day  lamoTRIgine 100 milliGRAM(s) Oral daily  metroNIDAZOLE    Tablet 500 milliGRAM(s) Oral every 8 hours  mirtazapine 30 milliGRAM(s) Oral at bedtime  oxybutynin 5 milliGRAM(s) Oral three times a day  pantoprazole    Tablet 40 milliGRAM(s) Oral before breakfast  polyethylene glycol 3350 17 Gram(s) Oral daily  venlafaxine XR. 150 milliGRAM(s) Oral daily    MEDICATIONS  (PRN):  acetaminophen   Tablet .. 650 milliGRAM(s) Oral every 6 hours PRN Temp greater or equal to 38.5C (101.3F), Mild Pain (1 - 3)  melatonin 3 milliGRAM(s) Oral at bedtime PRN Insomnia      ----  REVIEW OF SYSTEMS:  Constitutional: denies fever, chills  HEENT: denies headache, dizziness, or lightheadedness  Respiratory: denies cough, or wheezing  Cardiovascular: denies CP, palpitations  Gastrointestinal: denies nausea, vomiting, diarrhea, constipation, or bloody stools  Genitourinary: denies painful urination urgency, or bloody urine  Skin/Breast: denies rashes or itching  Musculoskeletal: denies muscle aches, joint swelling, or muscle weakness  Neurologic: denies loss of sensation, numbness, or tingling    ----  PHYSICAL EXAM:  Gen: well appearing, NAD  HEENT: NCAT, PEERLA b/l, EOMI b/l, no conjunctival erythema  Cardio: regular rate and rhythm, +s1s2, no murmurs, rubs, or gallops.   Pulm: crackles b/l lung bases  Abdomen: soft, nontender, nondistended, +BS x4 quadrants, no guarding  Extremities: trace edema in LE b/l  Neuro: AAOx3  Skin: warm and dry      T(C): 36.4 (07-26-21 @ 10:51), Max: 36.6 (07-25-21 @ 19:26)  HR: 56 (07-26-21 @ 10:51) (51 - 60)  BP: 147/73 (07-26-21 @ 10:51) (147/73 - 166/87)  RR: 18 (07-26-21 @ 04:20) (18 - 19)  SpO2: 95% (07-26-21 @ 04:20) (93% - 97%)  Wt(kg): --    ----  I&O's Summary      LABS:                        8.7    7.80  )-----------( 250      ( 26 Jul 2021 07:46 )             28.5     07-26    140  |  108  |  43<H>  ----------------------------<  116<H>  3.7   |  25  |  2.30<H>    Ca    8.5      26 Jul 2021 07:46          CAPILLARY BLOOD GLUCOSE      POCT Blood Glucose.: 125 mg/dL (26 Jul 2021 11:22)  POCT Blood Glucose.: 114 mg/dL (26 Jul 2021 07:17)  POCT Blood Glucose.: 138 mg/dL (25 Jul 2021 21:15)  POCT Blood Glucose.: 118 mg/dL (25 Jul 2021 17:23)                ----  Personally reviewed:  Vital sign trends: [  ] yes    [  ] no     [  ] n/a  Laboratory results: [  ] yes    [  ] no     [  ] n/a  Radiology results: [  ] yes    [  ] no     [  ] n/a  Culture results: [  ] yes    [  ] no     [  ] n/a  Consultant recommendations: [  ] yes    [  ] no     [  ] n/a         Patient is a 76y old  Male who presents with a chief complaint of dyspnea, uncontrolled HTN, elevated troponin (26 Jul 2021 11:00)      FROM ADMISSION H+P:   HPI:  75 y/o M with PMHx of Type 2 DM (not on insulin), CAD s/p stents >4 years ago (off plavix), Lupus, HTN, HLD, CKD 3, HepC, diverticulosis, 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 SOB began 2 days while patient was lying in bed. States that he felt like he was having difficulty catching his breath and has been constant since onset. SOB not worse with exertion and has no other aggravating or alleviating factors. Has been having associated sharp jabbing pain in the lower rib areas. Pain is mild, non-radiating, gradual in onset, has no aggravating/alleviating factors, and resolves spontaneously in 30 min. Had one episode of diffuse, cramping abdominal pain yesterday evening and nausea that resolved spontaneously. Also, endorses calf soreness but states that it is not really calf pain. Of note, pt has history of HTN, does not follow with cardiologist. States that he checks his BP daily and for the past two months it has been severely elevated with SBP in the 200s. Denies having any fever, chills, cough, cold-like symptoms, headache, visual changes, chest pain, diarrhea, or dysuria. Has no sick contacts.     In the ED,  Vital Signs T(F) Max: 99.1 BP initially 186/104 (173//93) HR: 66 RR: 18 SpO2: 98%  Labs significant for: H/H 9.9/32.6, D-dimer 486, BUN/Cr 27/1.60, trop 0.068, proBNP 1995  CXR: no acute infiltrate on personal read  CT Angio Chest with IV contrast: Patent central airways. Dependent and basilar atelectasis. Otherwise clear lungs. Negative for pulmonary embolism.  EKG: SR with 1st degree AV block at 73bpm, nonspecific twave abn (19 Jul 2021 22:10)      ----  INTERVAL HPI/OVERNIGHT EVENTS: Pt seen and evaluated at the bedside. No acute overnight events occurred. Patient reports SOB while laying down, LLQ 3/10 non radiating pain, and RUQ pain on palpation 3/10 nonradiating. Patient denies chest pain, palpitations, cough, n/v/d/c, dysuria, change in urine color.    ----  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  Had surgery ( April 2013 )    S/P tonsillectomy    S/P arthroscopic knee surgery  Bilateral ( 2005 )    Torsion of testicle  Had surgery at age 14    Pilonidal cyst  Had surgery ( 1969 )    S/P cataract surgery  Bilateral    H/O hernia repair        FAMILY HISTORY:  FHx: throat cancer    No family history of colorectal cancer        Home Medications:  amLODIPine 10 mg oral tablet: 1 tab(s) orally once a day (20 Jul 2021 00:24)  ARIPiprazole 30 mg oral tablet: 1 tab(s) orally once a day (20 Jul 2021 00:24)  ARISTADA     INJ 1064MG: INJECT 3.9ML INTRAMUSCULARLY AS DIRECTED (20 Jul 2021 00:24)  DOXAZOSIN 4MG TAB: TAKE 1 TABLET TWICE A DAY (FOR PROSTATE) (20 Jul 2021 00:24)  DOXEPIN 25MG CAP: TAKE ONE CAPSULE AT BEDTIME (20 Jul 2021 00:24)  FENOFIBRATE  TAB 145MG: TAKE ONE TABLET DAILY (FOR CHOLESTEROL) (20 Jul 2021 00:24)  hydrALAZINE 100 mg oral tablet: 1 tab(s) orally 3 times a day (20 Jul 2021 00:24)  labetalol 100 mg oral tablet: 1 tab(s) orally once a day (20 Jul 2021 00:24)  LAMOTRIGINE 100MG TA: TAKE ONE TABLET DAILY (20 Jul 2021 00:24)  meloxicam 7.5 mg oral tablet: 1 tab(s) orally 2 times a day (20 Jul 2021 00:24)  metFORMIN 500 mg oral tablet: 1 tab(s) orally once a day (20 Jul 2021 00:24)  MIRTAZAPINE  TAB 30MG: TAKE 1 TABLET AT BEDTIME (20 Jul 2021 00:24)  OMEPRAZOLE 40MG CAP: TAKE 1 CAPSULE DAILY (FOR STOMACH) (20 Jul 2021 00:24)  OXYBUTYNIN ER 15MG TAB: TAKE ONE TABLET DAILY (20 Jul 2021 00:24)  VASCEPA 1GM CAP: 2 cap(s) orally 2 times a day (20 Jul 2021 00:24)  VENLAFAXINE XR 150MG CAPS: TAKE 1 CAPSULE DAILY (20 Jul 2021 00:24)  VITAMIN D 1000UNIT (M) TAB: TAKE 5 TABLETS DAILY (20 Jul 2021 00:24)      Allergies    No Known Allergies    Intolerances        ----  MEDICATIONS  (STANDING):  amLODIPine   Tablet 10 milliGRAM(s) Oral daily  ARIPiprazole 30 milliGRAM(s) Oral daily  aspirin enteric coated 81 milliGRAM(s) Oral daily  atorvastatin 40 milliGRAM(s) Oral at bedtime  cholecalciferol 5000 Unit(s) Oral daily  ciprofloxacin     Tablet 500 milliGRAM(s) Oral every 12 hours  cloNIDine 0.1 milliGRAM(s) Oral three times a day  dextrose 40% Gel 15 Gram(s) Oral once  dextrose 5%. 1000 milliLiter(s) (50 mL/Hr) IV Continuous <Continuous>  dextrose 5%. 1000 milliLiter(s) (100 mL/Hr) IV Continuous <Continuous>  dextrose 50% Injectable 25 Gram(s) IV Push once  dextrose 50% Injectable 12.5 Gram(s) IV Push once  dextrose 50% Injectable 25 Gram(s) IV Push once  doxazosin 4 milliGRAM(s) Oral <User Schedule>  doxepin 25 milliGRAM(s) Oral at bedtime  enoxaparin Injectable 40 milliGRAM(s) SubCutaneous daily  fenofibrate Tablet 145 milliGRAM(s) Oral daily  glucagon  Injectable 1 milliGRAM(s) IntraMuscular once  hydrALAZINE 100 milliGRAM(s) Oral three times a day  insulin lispro (ADMELOG) corrective regimen sliding scale   SubCutaneous three times a day before meals  insulin lispro (ADMELOG) corrective regimen sliding scale   SubCutaneous at bedtime  labetalol 300 milliGRAM(s) Oral three times a day  lamoTRIgine 100 milliGRAM(s) Oral daily  metroNIDAZOLE    Tablet 500 milliGRAM(s) Oral every 8 hours  mirtazapine 30 milliGRAM(s) Oral at bedtime  oxybutynin 5 milliGRAM(s) Oral three times a day  pantoprazole    Tablet 40 milliGRAM(s) Oral before breakfast  polyethylene glycol 3350 17 Gram(s) Oral daily  venlafaxine XR. 150 milliGRAM(s) Oral daily    MEDICATIONS  (PRN):  acetaminophen   Tablet .. 650 milliGRAM(s) Oral every 6 hours PRN Temp greater or equal to 38.5C (101.3F), Mild Pain (1 - 3)  melatonin 3 milliGRAM(s) Oral at bedtime PRN Insomnia      ----  REVIEW OF SYSTEMS:  Constitutional: denies fever, chills  HEENT: denies headache, dizziness, or lightheadedness  Respiratory: denies cough, or wheezing  Cardiovascular: denies CP, palpitations  Gastrointestinal: denies nausea, vomiting, diarrhea, constipation, or bloody stools  Genitourinary: denies painful urination urgency, or bloody urine  Skin/Breast: denies rashes or itching  Musculoskeletal: denies muscle aches, joint swelling, or muscle weakness  Neurologic: denies loss of sensation, numbness, or tingling    ----  PHYSICAL EXAM:  Gen: well appearing, NAD  HEENT: NCAT, PEERLA b/l, EOMI b/l, no conjunctival erythema  Cardio: regular rate and rhythm, +s1s2, no murmurs, rubs, or gallops.   Pulm: crackles b/l lung bases  Abdomen: soft, nontender, nondistended, +BS x4 quadrants, no guarding  Extremities: trace edema in LE b/l  Neuro: AAOx3  Skin: warm and dry      T(C): 36.4 (07-26-21 @ 10:51), Max: 36.6 (07-25-21 @ 19:26)  HR: 56 (07-26-21 @ 10:51) (51 - 60)  BP: 147/73 (07-26-21 @ 10:51) (147/73 - 166/87)  RR: 18 (07-26-21 @ 04:20) (18 - 19)  SpO2: 95% (07-26-21 @ 04:20) (93% - 97%)  Wt(kg): --    ----  I&O's Summary      LABS:                        8.7    7.80  )-----------( 250      ( 26 Jul 2021 07:46 )             28.5     07-26    140  |  108  |  43<H>  ----------------------------<  116<H>  3.7   |  25  |  2.30<H>    Ca    8.5      26 Jul 2021 07:46          CAPILLARY BLOOD GLUCOSE      POCT Blood Glucose.: 125 mg/dL (26 Jul 2021 11:22)  POCT Blood Glucose.: 114 mg/dL (26 Jul 2021 07:17)  POCT Blood Glucose.: 138 mg/dL (25 Jul 2021 21:15)  POCT Blood Glucose.: 118 mg/dL (25 Jul 2021 17:23)                ----

## 2021-07-26 NOTE — PROGRESS NOTE ADULT - PROBLEM SELECTOR PLAN 2
BUN/Cr 41/2.30, baseline cr of 1.60   -now uptrending cr from 1.90 to 2.30  -d/c losartan and avoid nephrotoxic meds  -continue to trend renal indices  -Nephrology following BUN/Cr 41/2.30, baseline cr of 1.60 with history of CKD -suspect 2/2 longstanding HTN +/- DM type 2  -d/c losartan and avoid nephrotoxic meds if possible  -continue to trend renal indices  -hold off on ivf at this time as patient hypervolemic on exam  -Nephrology following

## 2021-07-26 NOTE — PROGRESS NOTE ADULT - PROBLEM SELECTOR PLAN 3
-pt admits to abdominal pain--> U/S renal, abdominal pelvis and CT abdomen and pelvis  -CT AP--> mild uncomplicated diverticulitis and mild hepatomegaly--> continue cipro and flagyl  -abdominal pain improving

## 2021-07-26 NOTE — PROGRESS NOTE ADULT - PROBLEM SELECTOR PLAN 5
-s/p PCI with stents  -taken off Plavix and statin  -continue home fenofibrate  -Continue aspirin 81mg daily and atorvastatin 40mg daily -s/p PCI with stents  -taken off Plavix  -continue home fenofibrate  -Continue aspirin 81mg daily and atorvastatin 40mg daily

## 2021-07-26 NOTE — PROGRESS NOTE ADULT - ASSESSMENT
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     SOB/ Hypertension urgency   - Patient with history HTN and CAD presents with SOB x 2 days  - BNP:  <--1995.  No meaningful evidence of volume overload, may be elevated in setting of prolonged HTN  - ECHO 11/30/21 showed normal LV and RV  size and systolic function, estimated LVEF of 65%. Mild LVH Mild AS mild MR  - Repeat TTE showed normal LV/RVF with no significant valvular disease  - CTA chest negative for PE  - BP remains elevated up to 160 systolic  - Continue labetalol at 300 mg q8H.  Unable to increase due to bradycardia  - Continue with Amlodipine 10 mg QD and Hydralazine 100 mg Q8h  - Off ARB for worsening renal function  - Now on Clonidine.  Increase as needed but would be watchful with bradycardia  - Low sodium DASH diet    Elevated Troponin  - Denies chest pain presently with history CAD s/p stents  - Troponin I: 0.074<--.096, <--.067, <--.068, peaked, likely in the setting of hypertensive urgency   - EKG SR HR @ 70 bpm  - TTE as above  - Continue ASA and statin  - Discontinue tele    DVT ppx  - Per Primary    - Monitor and replete Lytes. Keep K > 4 and Mg > 2    - All other medical needs as per primary team.  - Other cardiovascular workup will depend on clinical course.  - Will continue to follow.    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     SOB/ Hypertension urgency   - Patient with history HTN and CAD presents with SOB x 2 days  - BNP:  <--1995 in setting of orthopnea.   - would give a trial of lasix. try lasix 40mg IV x 1  - ECHO 11/30/21 showed normal LV and RV  size and systolic function, estimated LVEF of 65%. Mild LVH Mild AS mild MR  - Repeat TTE showed normal LV/RVF with no significant valvular disease  - CTA chest negative for PE  - BP remains elevated up to 160 systolic  - Continue labetalol at 300 mg q8H.  Unable to increase due to bradycardia  - Continue with Amlodipine 10 mg QD and Hydralazine 100 mg Q8h  - Off ARB for worsening renal function  - Now on Clonidine.  Increase as needed but would be watchful with bradycardia  - Low sodium DASH diet    Elevated Troponin  - Denies chest pain presently with history CAD s/p stents  - Troponin I: 0.074<--.096, <--.067, <--.068, peaked, likely in the setting of hypertensive urgency   - EKG SR HR @ 70 bpm  - TTE as above  - Continue ASA and statin  - Discontinue tele    DVT ppx  - Per Primary    - Monitor and replete Lytes. Keep K > 4 and Mg > 2    - All other medical needs as per primary team.  - Other cardiovascular workup will depend on clinical course.  - Will continue to follow.    Shilpa Kirkpatrick DNP, NP-C  Cardiology   Spectra #[phone]

## 2021-07-26 NOTE — PROGRESS NOTE ADULT - SUBJECTIVE AND OBJECTIVE BOX
Patient is a 76y old  Male who presents with a chief complaint of dyspnea, uncontrolled HTN, elevated troponin (26 Jul 2021 12:43)  Patient seen in follow up for CKD.        PAST MEDICAL HISTORY:  Hypertension    Diabetes mellitus    Lupus    Hepatitis C    Anxiety and depression    CAD (coronary artery disease)    Diverticulosis    Hyperlipidemia      MEDICATIONS  (STANDING):  amLODIPine   Tablet 10 milliGRAM(s) Oral daily  ARIPiprazole 30 milliGRAM(s) Oral daily  aspirin enteric coated 81 milliGRAM(s) Oral daily  atorvastatin 40 milliGRAM(s) Oral at bedtime  cholecalciferol 5000 Unit(s) Oral daily  ciprofloxacin     Tablet 500 milliGRAM(s) Oral every 12 hours  cloNIDine 0.1 milliGRAM(s) Oral three times a day  dextrose 40% Gel 15 Gram(s) Oral once  dextrose 5%. 1000 milliLiter(s) (50 mL/Hr) IV Continuous <Continuous>  dextrose 5%. 1000 milliLiter(s) (100 mL/Hr) IV Continuous <Continuous>  dextrose 50% Injectable 25 Gram(s) IV Push once  dextrose 50% Injectable 12.5 Gram(s) IV Push once  dextrose 50% Injectable 25 Gram(s) IV Push once  doxazosin 4 milliGRAM(s) Oral <User Schedule>  doxepin 25 milliGRAM(s) Oral at bedtime  enoxaparin Injectable 40 milliGRAM(s) SubCutaneous daily  fenofibrate Tablet 145 milliGRAM(s) Oral daily  furosemide   Injectable 40 milliGRAM(s) IV Push once  glucagon  Injectable 1 milliGRAM(s) IntraMuscular once  hydrALAZINE 100 milliGRAM(s) Oral three times a day  insulin lispro (ADMELOG) corrective regimen sliding scale   SubCutaneous three times a day before meals  insulin lispro (ADMELOG) corrective regimen sliding scale   SubCutaneous at bedtime  labetalol 300 milliGRAM(s) Oral three times a day  lamoTRIgine 100 milliGRAM(s) Oral daily  metroNIDAZOLE    Tablet 500 milliGRAM(s) Oral every 8 hours  mirtazapine 30 milliGRAM(s) Oral at bedtime  oxybutynin 5 milliGRAM(s) Oral three times a day  pantoprazole    Tablet 40 milliGRAM(s) Oral before breakfast  polyethylene glycol 3350 17 Gram(s) Oral daily  venlafaxine XR. 150 milliGRAM(s) Oral daily    MEDICATIONS  (PRN):  acetaminophen   Tablet .. 650 milliGRAM(s) Oral every 6 hours PRN Temp greater or equal to 38.5C (101.3F), Mild Pain (1 - 3)  melatonin 3 milliGRAM(s) Oral at bedtime PRN Insomnia    T(C): 36.4 (07-26-21 @ 10:51), Max: 37.2 (07-25-21 @ 04:40)  HR: 56 (07-26-21 @ 10:51) (51 - 62)  BP: 147/73 (07-26-21 @ 10:51) (147/73 - 166/87)  RR: 18 (07-26-21 @ 04:20) (18 - 19)  SpO2: 95% (07-26-21 @ 04:20) (93% - 97%)  Wt(kg): --  I&O's Detail      PHYSICAL EXAM:  General: No distress  Respiratory: b/l air entry  Cardiovascular: S1 S2  Gastrointestinal: soft  Extremities:  no edema                              8.7    7.80  )-----------( 250      ( 26 Jul 2021 07:46 )             28.5     07-26    140  |  108  |  43<H>  ----------------------------<  116<H>  3.7   |  25  |  2.30<H>    Ca    8.5      26 Jul 2021 07:46                Sodium, Serum: 140 (07-26 @ 07:46)  Sodium, Serum: 140 (07-25 @ 06:55)  Sodium, Serum: 141 (07-24 @ 07:41)  Sodium, Serum: 141 (07-23 @ 07:23)    Creatinine, Serum: 2.30 (07-26 @ 07:46)  Creatinine, Serum: 2.30 (07-25 @ 06:55)  Creatinine, Serum: 1.90 (07-24 @ 07:41)  Creatinine, Serum: 2.10 (07-23 @ 07:23)    Potassium, Serum: 3.7 (07-26 @ 07:46)  Potassium, Serum: 3.8 (07-25 @ 06:55)  Potassium, Serum: 3.8 (07-24 @ 07:41)  Potassium, Serum: 3.8 (07-23 @ 07:23)    Hemoglobin: 8.7 (07-26 @ 07:46)  Hemoglobin: 9.5 (07-24 @ 07:41)  Hemoglobin: 9.2 (07-23 @ 07:23)

## 2021-07-26 NOTE — DIETITIAN INITIAL EVALUATION ADULT. - OTHER INFO
75 y/o male adm with SOB/hypertensive urgency. PMH HLD, diverticulosis, CAD, anxiety and depression, hep C, lupus, DM, HTN. Pt visited at bedside this am. Pt reports that he is tolerating meals well with a good po intake. Last BM noted on 7/23 (miralax ordered, pt refusing prune ursula at this time). Pt states that he is aware of what foods are high in sodium. Pt consumes very high sodium foods at home and always has a salt shaker near by. Provided pt with verbal information re: DASH/TLC diet. Encouraged pt to comply with salt/sodium restrictions. Also rx that pt try to cut down on portion sizes. As pt "likes to" order from an [employer] restaurant regularly. Pt verbalized understanding, compliance encouraged. As per pt, his UBW is 250#, currently weighing 242#. No edema noted. Pt made aware of A1c of 6.1 (pt does not test his blood glucose at home).

## 2021-07-26 NOTE — PROGRESS NOTE ADULT - PROBLEM SELECTOR PLAN 10
-continue home cardura  -on oxybutnin 15mg ER at home - will c/w oxybutnin 5mg TID  -f/u OP with urology DVT prophylaxis: Lovenox 40mg daily

## 2021-07-26 NOTE — PROGRESS NOTE ADULT - ASSESSMENT
76 male with a history of HTN, CAD, CHF, HLD, DM. PVD, SLE, Hepatitis C, depression and CKD now with admitted for accelerated HTN and possible ACS. S/P IV Contrast dye.     Renal indices have plateaued. To continue current meds. Monitor BP trend. Titrate BP meds as needed. Salt restriction.   D/w patient regarding need for out patient nephrology follow up. Avoid nephrotoxic meds as possible.

## 2021-07-26 NOTE — PROGRESS NOTE ADULT - PROBLEM SELECTOR PLAN 7
-stable, was admitted in the past for GI bleed  -no evidence of bleeding at present  -iron studies consistent with iron deficiency anemia - will check FOBT  -ferritin of 11 - s/p venofer x3  -vit b12 and folate WNL  -monitor H/H  -will continue with lovenox for now

## 2021-07-26 NOTE — PROGRESS NOTE ADULT - PROBLEM SELECTOR PLAN 1
admitted with Hypertensive urgency with BPs in 200s  -continue 300mg labetalol TID, hydralazine 100mg TID, and amlodipine 10mg daily  -d/c losartan in setting of uptrending renal indices  -added clonidine 0.1 TID  -TTE: EF 60%, left artial enlargement, mild AS  -U/S A/P--> no evidence of renal artery stenosis  -SOB while laying down--> cardio rec lasix IV 40 x 1 (still considering)  -cardio Dr. Monterroso following admitted with Hypertensive urgency with BPs in 200s  -continue 300mg labetalol TID, hydralazine 100mg TID, and amlodipine 10mg daily  -d/c losartan in setting of uptrending renal indices  -added clonidine 0.1 TID  -TTE: EF 60%, left artial enlargement, mild AS  -U/S A/P--> no evidence of renal artery stenosis  -SOB while laying down with b/l crackles on base of lungs--> cardio rec lasix IV 40 x 1   -cardio Dr. Monterroso following admitted with Hypertensive urgency with BPs in 200s  -continue 300mg labetalol TID, hydralazine 100mg TID, and amlodipine 10mg daily  -d/c losartan in setting of uptrending renal indices  -added clonidine 0.1 TID for further BP control  -TTE: EF 60%, left artial enlargement, mild AS  -U/S A/P--> no evidence of renal artery stenosis  -SOB while laying down with b/l crackles on base of lungs--> will give lasix IV 40mg x 1   -cardio Dr. Monterroso following verbal instruction/written material

## 2021-07-26 NOTE — DIETITIAN INITIAL EVALUATION ADULT. - PROBLEM SELECTOR PLAN 3
s/p PCI with stents  was taken off of Plavix and statin  will continue with home fenofibrate. Pt is also on vascepa at home but this is non-formulary.  will START aspirin 81mg daily and atorvastatin 40mg daily

## 2021-07-26 NOTE — PROGRESS NOTE ADULT - PROBLEM SELECTOR PLAN 4
-Suspect likely demand in setting of prolonged HTN, no signs of acute ischemia  -trops peaked at 0.096 on 7/20  -Continue aspirin and statin

## 2021-07-26 NOTE — PROGRESS NOTE ADULT - PROBLEM SELECTOR PROBLEM 1
Hypertensive urgency

## 2021-07-26 NOTE — PROGRESS NOTE ADULT - SUBJECTIVE AND OBJECTIVE BOX
[facility] -- Fifi Bliss, Kalpesh Valdovinos, Abraham Sheridan Savella, Goodger  Office # [phone]    Follow Up:  Uncontrolled HTN    Subjective/Observations: Seen sleeping comfortably on side-lying position on RA.  Easily awakened.  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  Had surgery ( April 2013 )    S/P tonsillectomy    S/P arthroscopic knee surgery  Bilateral ( 2005 )    Torsion of testicle  Had surgery at age 14    Pilonidal cyst  Had surgery ( 1969 )    S/P cataract surgery  Bilateral    H/O hernia repair    MEDICATIONS  (STANDING):  amLODIPine   Tablet 10 milliGRAM(s) Oral daily  ARIPiprazole 30 milliGRAM(s) Oral daily  aspirin enteric coated 81 milliGRAM(s) Oral daily  atorvastatin 40 milliGRAM(s) Oral at bedtime  cholecalciferol 5000 Unit(s) Oral daily  ciprofloxacin     Tablet 500 milliGRAM(s) Oral every 12 hours  cloNIDine 0.1 milliGRAM(s) Oral three times a day  dextrose 40% Gel 15 Gram(s) Oral once  dextrose 5%. 1000 milliLiter(s) (50 mL/Hr) IV Continuous <Continuous>  dextrose 5%. 1000 milliLiter(s) (100 mL/Hr) IV Continuous <Continuous>  dextrose 50% Injectable 25 Gram(s) IV Push once  dextrose 50% Injectable 12.5 Gram(s) IV Push once  dextrose 50% Injectable 25 Gram(s) IV Push once  doxazosin 4 milliGRAM(s) Oral <User Schedule>  doxepin 25 milliGRAM(s) Oral at bedtime  enoxaparin Injectable 40 milliGRAM(s) SubCutaneous daily  fenofibrate Tablet 145 milliGRAM(s) Oral daily  glucagon  Injectable 1 milliGRAM(s) IntraMuscular once  hydrALAZINE 100 milliGRAM(s) Oral three times a day  insulin lispro (ADMELOG) corrective regimen sliding scale   SubCutaneous three times a day before meals  insulin lispro (ADMELOG) corrective regimen sliding scale   SubCutaneous at bedtime  labetalol 300 milliGRAM(s) Oral three times a day  lamoTRIgine 100 milliGRAM(s) Oral daily  metroNIDAZOLE    Tablet 500 milliGRAM(s) Oral every 8 hours  mirtazapine 30 milliGRAM(s) Oral at bedtime  oxybutynin 5 milliGRAM(s) Oral three times a day  pantoprazole    Tablet 40 milliGRAM(s) Oral before breakfast  polyethylene glycol 3350 17 Gram(s) Oral daily  venlafaxine XR. 150 milliGRAM(s) Oral daily    MEDICATIONS  (PRN):  acetaminophen   Tablet .. 650 milliGRAM(s) Oral every 6 hours PRN Temp greater or equal to 38.5C (101.3F), Mild Pain (1 - 3)  melatonin 3 milliGRAM(s) Oral at bedtime PRN Insomnia    Allergies    No Known Allergies    Intolerances    Vital Signs Last 24 Hrs  T(C): 36.3 (26 Jul 2021 04:20), Max: 36.6 (25 Jul 2021 19:26)  T(F): 97.4 (26 Jul 2021 04:20), Max: 97.9 (25 Jul 2021 19:26)  HR: 60 (26 Jul 2021 04:20) (51 - 60)  BP: 166/87 (26 Jul 2021 04:20) (148/78 - 166/87)  BP(mean): --  RR: 18 (26 Jul 2021 04:20) (18 - 19)  SpO2: 95% (26 Jul 2021 04:20) (93% - 97%)  I&O's Summary      PHYSICAL EXAM:  TELE:  Sinus Homer  Constitutional: NAD, awake and alert, obese  HEENT: Moist Mucous Membranes, Anicteric  Pulmonary: Non-labored, breath sounds are clear bilaterally, No wheezing, rales or rhonchi  Cardiovascular: Regular, S1 and S2, No murmurs, rubs, gallops or clicks  Gastrointestinal: Bowel Sounds present, soft, nontender.   Lymph: No peripheral edema. No lymphadenopathy.  Skin: No visible rashes or ulcers.  Psych:  Mood & affect appropriate  LABS: All Labs Reviewed:                        8.7    7.80  )-----------( 250      ( 26 Jul 2021 07:46 )             28.5                         9.5    6.57  )-----------( 269      ( 24 Jul 2021 07:41 )             31.2     26 Jul 2021 07:46    140    |  108    |  43     ----------------------------<  116    3.7     |  25     |  2.30   25 Jul 2021 06:55    140    |  107    |  41     ----------------------------<  106    3.8     |  25     |  2.30   24 Jul 2021 07:41    141    |  108    |  37     ----------------------------<  104    3.8     |  26     |  1.90     Ca    8.5        26 Jul 2021 07:46  Ca    8.6        25 Jul 2021 06:55  Ca    8.5        24 Jul 2021 07:41       EXAM:  ECHO TTE WO CON COMP W DOPP         PROCEDURE DATE:  07/20/2021        INTERPRETATION:  INDICATION: Dyspnea  Sonographer KL    Blood Pressure 199/92    Height 172.7cm     Weight 99.8kg       BSA 2.13m sq    Dimensions:  LA 4.2       Normal Values: 2.0 - 4.0 cm  Ao 4.0        Normal Values: 2.0 - 3.8 cm  SEPTUM 1.7       Normal Values: 0.6 - 1.2 cm  PWT 1.7       Normal Values: 0.6 - 1.1 cm  LVIDd 5.2         Normal Values: 3.0 - 5.6 cm  LVIDs 3.8         Normal Values: 1.8 - 4.0 cm      OBSERVATIONS:  Mitral Valve: Thickened leaflets, mild MR.  Aortic Valve/Aorta: Calcified trileaflet aortic valve with decreased opening. Peak transaortic valve gradient is 28 mmHg with a mean transaortic valve gradient of 16 mmHg. Aortic valve area is calculus be 1.8 sq cm. This consistent with mild aortic stenosis.  Tricuspid Valve: normal with trace TR.  Pulmonic Valve: Not well-visualized  Left Atrium: Enlarged  Right Atrium: Not well-visualized  Left Ventricle: Normal left ventricular systolic function, estimated LVEF of 60%.  Right Ventricle: Grossly normal size and systolic function.  Pericardium: no significant pericardial effusion.  LV diastolic dysfunction is present    IMPRESSION:  Normal left ventricular systolic function, estimated LVEF of 60%.  Grossly normal RV size and systolic function.  Left atrial enlargement  Calcified trileaflet aortic valve with mild aortic stenosis, without AI.  Mild MR  Trace TR.  No significant pericardial effusion.    --- End of Report ---      VIET GREENWOOD MD; Attending Cardiologist  This document has been electronically signed. Jul 21 2021 12:31PM    EXAM:  XR CHEST PORTABLE URGENT 1V                          PROCEDURE DATE:  07/23/2021      INTERPRETATION:  CLINICAL STATEMENT: Chest pain.    TECHNIQUE: AP view of the chest.    COMPARISON: 7/19/2021    FINDINGS/  IMPRESSION:  No consolidation or pleural effusion    Heart size cannot be accurately assessed in this projection, but appear enlarged.    --- End of Report ---    JUANPABLO BRITO MD; Attending Radiologist  This document has been electronically signed. Jul 24 2021  9:49AM    Ventricular Rate 73 BPM    Atrial Rate 73 BPM    P-R Interval 256 ms    QRS Duration 114 ms    Q-T Interval 428 ms    QTC Calculation(Bazett) 471 ms    R Axis -14 degrees    T Axis 33 degrees    Diagnosis Line Sinus rhythm with 1st degree AV block  Nonspecific T wave abnormality  Abnormal ECG    Confirmed by lynne Monterroso ([id number]) on 7/19/2021 4:09:03 PM           [facility] -- Fifi Bliss, Kalpesh Valdovinos, Abraham Sheridan Savella, Goodger  Office # [phone]    Follow Up:  Uncontrolled HTN    Subjective/Observations: Seen sleeping comfortably on side-lying position on RA.  Easily awakened.  States that he has orthopnea. No chest pain. More comfortable in chair.     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  Had surgery ( April 2013 )    S/P tonsillectomy    S/P arthroscopic knee surgery  Bilateral ( 2005 )    Torsion of testicle  Had surgery at age 14    Pilonidal cyst  Had surgery ( 1969 )    S/P cataract surgery  Bilateral    H/O hernia repair    MEDICATIONS  (STANDING):  amLODIPine   Tablet 10 milliGRAM(s) Oral daily  ARIPiprazole 30 milliGRAM(s) Oral daily  aspirin enteric coated 81 milliGRAM(s) Oral daily  atorvastatin 40 milliGRAM(s) Oral at bedtime  cholecalciferol 5000 Unit(s) Oral daily  ciprofloxacin     Tablet 500 milliGRAM(s) Oral every 12 hours  cloNIDine 0.1 milliGRAM(s) Oral three times a day  dextrose 40% Gel 15 Gram(s) Oral once  dextrose 5%. 1000 milliLiter(s) (50 mL/Hr) IV Continuous <Continuous>  dextrose 5%. 1000 milliLiter(s) (100 mL/Hr) IV Continuous <Continuous>  dextrose 50% Injectable 25 Gram(s) IV Push once  dextrose 50% Injectable 12.5 Gram(s) IV Push once  dextrose 50% Injectable 25 Gram(s) IV Push once  doxazosin 4 milliGRAM(s) Oral <User Schedule>  doxepin 25 milliGRAM(s) Oral at bedtime  enoxaparin Injectable 40 milliGRAM(s) SubCutaneous daily  fenofibrate Tablet 145 milliGRAM(s) Oral daily  glucagon  Injectable 1 milliGRAM(s) IntraMuscular once  hydrALAZINE 100 milliGRAM(s) Oral three times a day  insulin lispro (ADMELOG) corrective regimen sliding scale   SubCutaneous three times a day before meals  insulin lispro (ADMELOG) corrective regimen sliding scale   SubCutaneous at bedtime  labetalol 300 milliGRAM(s) Oral three times a day  lamoTRIgine 100 milliGRAM(s) Oral daily  metroNIDAZOLE    Tablet 500 milliGRAM(s) Oral every 8 hours  mirtazapine 30 milliGRAM(s) Oral at bedtime  oxybutynin 5 milliGRAM(s) Oral three times a day  pantoprazole    Tablet 40 milliGRAM(s) Oral before breakfast  polyethylene glycol 3350 17 Gram(s) Oral daily  venlafaxine XR. 150 milliGRAM(s) Oral daily    MEDICATIONS  (PRN):  acetaminophen   Tablet .. 650 milliGRAM(s) Oral every 6 hours PRN Temp greater or equal to 38.5C (101.3F), Mild Pain (1 - 3)  melatonin 3 milliGRAM(s) Oral at bedtime PRN Insomnia    Allergies    No Known Allergies    Intolerances    Vital Signs Last 24 Hrs  T(C): 36.3 (26 Jul 2021 04:20), Max: 36.6 (25 Jul 2021 19:26)  T(F): 97.4 (26 Jul 2021 04:20), Max: 97.9 (25 Jul 2021 19:26)  HR: 60 (26 Jul 2021 04:20) (51 - 60)  BP: 166/87 (26 Jul 2021 04:20) (148/78 - 166/87)  BP(mean): --  RR: 18 (26 Jul 2021 04:20) (18 - 19)  SpO2: 95% (26 Jul 2021 04:20) (93% - 97%)  I&O's Summary      PHYSICAL EXAM:  TELE:  Sinus Homer  Constitutional: NAD, awake and alert, obese  HEENT: Moist Mucous Membranes, Anicteric  Pulmonary: Decreased breath sounds b/l. basilar crackles   Cardiovascular: Regular, S1 and S2, 2/6 SM  Gastrointestinal: Bowel Sounds present, soft, nontender.   Lymph: trace peripheral edema. No lymphadenopathy.  Skin: No visible rashes or ulcers.  Psych:  Mood & affect appropriate  LABS: All Labs Reviewed:                        8.7    7.80  )-----------( 250      ( 26 Jul 2021 07:46 )             28.5                         9.5    6.57  )-----------( 269      ( 24 Jul 2021 07:41 )             31.2     26 Jul 2021 07:46    140    |  108    |  43     ----------------------------<  116    3.7     |  25     |  2.30   25 Jul 2021 06:55    140    |  107    |  41     ----------------------------<  106    3.8     |  25     |  2.30   24 Jul 2021 07:41    141    |  108    |  37     ----------------------------<  104    3.8     |  26     |  1.90     Ca    8.5        26 Jul 2021 07:46  Ca    8.6        25 Jul 2021 06:55  Ca    8.5        24 Jul 2021 07:41       EXAM:  ECHO TTE WO CON COMP W DOPP         PROCEDURE DATE:  07/20/2021        INTERPRETATION:  INDICATION: Dyspnea  Sonographer KL    Blood Pressure 199/92    Height 172.7cm     Weight 99.8kg       BSA 2.13m sq    Dimensions:  LA 4.2       Normal Values: 2.0 - 4.0 cm  Ao 4.0        Normal Values: 2.0 - 3.8 cm  SEPTUM 1.7       Normal Values: 0.6 - 1.2 cm  PWT 1.7       Normal Values: 0.6 - 1.1 cm  LVIDd 5.2         Normal Values: 3.0 - 5.6 cm  LVIDs 3.8         Normal Values: 1.8 - 4.0 cm      OBSERVATIONS:  Mitral Valve: Thickened leaflets, mild MR.  Aortic Valve/Aorta: Calcified trileaflet aortic valve with decreased opening. Peak transaortic valve gradient is 28 mmHg with a mean transaortic valve gradient of 16 mmHg. Aortic valve area is calculus be 1.8 sq cm. This consistent with mild aortic stenosis.  Tricuspid Valve: normal with trace TR.  Pulmonic Valve: Not well-visualized  Left Atrium: Enlarged  Right Atrium: Not well-visualized  Left Ventricle: Normal left ventricular systolic function, estimated LVEF of 60%.  Right Ventricle: Grossly normal size and systolic function.  Pericardium: no significant pericardial effusion.  LV diastolic dysfunction is present    IMPRESSION:  Normal left ventricular systolic function, estimated LVEF of 60%.  Grossly normal RV size and systolic function.  Left atrial enlargement  Calcified trileaflet aortic valve with mild aortic stenosis, without AI.  Mild MR  Trace TR.  No significant pericardial effusion.    --- End of Report ---      VIET GREENWOOD MD; Attending Cardiologist  This document has been electronically signed. Jul 21 2021 12:31PM    EXAM:  XR CHEST PORTABLE URGENT 1V                          PROCEDURE DATE:  07/23/2021      INTERPRETATION:  CLINICAL STATEMENT: Chest pain.    TECHNIQUE: AP view of the chest.    COMPARISON: 7/19/2021    FINDINGS/  IMPRESSION:  No consolidation or pleural effusion    Heart size cannot be accurately assessed in this projection, but appear enlarged.    --- End of Report ---    JUANPABLO BRITO MD; Attending Radiologist  This document has been electronically signed. Jul 24 2021  9:49AM    Ventricular Rate 73 BPM    Atrial Rate 73 BPM    P-R Interval 256 ms    QRS Duration 114 ms    Q-T Interval 428 ms    QTC Calculation(Bazett) 471 ms    R Axis -14 degrees    T Axis 33 degrees    Diagnosis Line Sinus rhythm with 1st degree AV block  Nonspecific T wave abnormality  Abnormal ECG    Confirmed by lynne Monterroso ([id number]) on 7/19/2021 4:09:03 PM

## 2021-07-26 NOTE — DIETITIAN INITIAL EVALUATION ADULT. - PERTINENT LABORATORY DATA
7/26 H/H 8.7/28.5  BUN/Crea 43/2.3   Glu 116   POCT glu 7/26 114  7/25 138, 118, 227, 130  7/20 A1c 6.1 (average gluc 128)

## 2021-07-27 ENCOUNTER — TRANSCRIPTION ENCOUNTER (OUTPATIENT)
Age: 76
End: 2021-07-27

## 2021-07-27 VITALS — SYSTOLIC BLOOD PRESSURE: 123 MMHG | TEMPERATURE: 98 F | HEART RATE: 81 BPM | DIASTOLIC BLOOD PRESSURE: 68 MMHG

## 2021-07-27 LAB
ANION GAP SERPL CALC-SCNC: 7 MMOL/L — SIGNIFICANT CHANGE UP (ref 5–17)
BASOPHILS # BLD AUTO: 0.03 K/UL — SIGNIFICANT CHANGE UP (ref 0–0.2)
BASOPHILS NFR BLD AUTO: 0.4 % — SIGNIFICANT CHANGE UP (ref 0–2)
BUN SERPL-MCNC: 39 MG/DL — HIGH (ref 7–23)
CALCIUM SERPL-MCNC: 8.6 MG/DL — SIGNIFICANT CHANGE UP (ref 8.5–10.1)
CHLORIDE SERPL-SCNC: 107 MMOL/L — SIGNIFICANT CHANGE UP (ref 96–108)
CO2 SERPL-SCNC: 27 MMOL/L — SIGNIFICANT CHANGE UP (ref 22–31)
CREAT SERPL-MCNC: 2.1 MG/DL — HIGH (ref 0.5–1.3)
EOSINOPHIL # BLD AUTO: 0.19 K/UL — SIGNIFICANT CHANGE UP (ref 0–0.5)
EOSINOPHIL NFR BLD AUTO: 2.7 % — SIGNIFICANT CHANGE UP (ref 0–6)
GLUCOSE SERPL-MCNC: 97 MG/DL — SIGNIFICANT CHANGE UP (ref 70–99)
HCT VFR BLD CALC: 28.4 % — LOW (ref 39–50)
HGB BLD-MCNC: 8.8 G/DL — LOW (ref 13–17)
IMM GRANULOCYTES NFR BLD AUTO: 0.3 % — SIGNIFICANT CHANGE UP (ref 0–1.5)
LYMPHOCYTES # BLD AUTO: 1.58 K/UL — SIGNIFICANT CHANGE UP (ref 1–3.3)
LYMPHOCYTES # BLD AUTO: 22.5 % — SIGNIFICANT CHANGE UP (ref 13–44)
MCHC RBC-ENTMCNC: 25.9 PG — LOW (ref 27–34)
MCHC RBC-ENTMCNC: 31 GM/DL — LOW (ref 32–36)
MCV RBC AUTO: 83.5 FL — SIGNIFICANT CHANGE UP (ref 80–100)
MONOCYTES # BLD AUTO: 0.5 K/UL — SIGNIFICANT CHANGE UP (ref 0–0.9)
MONOCYTES NFR BLD AUTO: 7.1 % — SIGNIFICANT CHANGE UP (ref 2–14)
NEUTROPHILS # BLD AUTO: 4.71 K/UL — SIGNIFICANT CHANGE UP (ref 1.8–7.4)
NEUTROPHILS NFR BLD AUTO: 67 % — SIGNIFICANT CHANGE UP (ref 43–77)
NRBC # BLD: 0 /100 WBCS — SIGNIFICANT CHANGE UP (ref 0–0)
PLATELET # BLD AUTO: 248 K/UL — SIGNIFICANT CHANGE UP (ref 150–400)
POTASSIUM SERPL-MCNC: 3.9 MMOL/L — SIGNIFICANT CHANGE UP (ref 3.5–5.3)
POTASSIUM SERPL-SCNC: 3.9 MMOL/L — SIGNIFICANT CHANGE UP (ref 3.5–5.3)
RBC # BLD: 3.4 M/UL — LOW (ref 4.2–5.8)
RBC # FLD: 18.1 % — HIGH (ref 10.3–14.5)
SODIUM SERPL-SCNC: 141 MMOL/L — SIGNIFICANT CHANGE UP (ref 135–145)
WBC # BLD: 7.03 K/UL — SIGNIFICANT CHANGE UP (ref 3.8–10.5)
WBC # FLD AUTO: 7.03 K/UL — SIGNIFICANT CHANGE UP (ref 3.8–10.5)

## 2021-07-27 PROCEDURE — U0005: CPT

## 2021-07-27 PROCEDURE — 84484 ASSAY OF TROPONIN QUANT: CPT

## 2021-07-27 PROCEDURE — 82550 ASSAY OF CK (CPK): CPT

## 2021-07-27 PROCEDURE — 85025 COMPLETE CBC W/AUTO DIFF WBC: CPT

## 2021-07-27 PROCEDURE — 96374 THER/PROPH/DIAG INJ IV PUSH: CPT

## 2021-07-27 PROCEDURE — 71045 X-RAY EXAM CHEST 1 VIEW: CPT

## 2021-07-27 PROCEDURE — 82728 ASSAY OF FERRITIN: CPT

## 2021-07-27 PROCEDURE — 82962 GLUCOSE BLOOD TEST: CPT

## 2021-07-27 PROCEDURE — 83735 ASSAY OF MAGNESIUM: CPT

## 2021-07-27 PROCEDURE — 99239 HOSP IP/OBS DSCHRG MGMT >30: CPT

## 2021-07-27 PROCEDURE — 74176 CT ABD & PELVIS W/O CONTRAST: CPT

## 2021-07-27 PROCEDURE — 83690 ASSAY OF LIPASE: CPT

## 2021-07-27 PROCEDURE — 83540 ASSAY OF IRON: CPT

## 2021-07-27 PROCEDURE — 99232 SBSQ HOSP IP/OBS MODERATE 35: CPT

## 2021-07-27 PROCEDURE — 93970 EXTREMITY STUDY: CPT

## 2021-07-27 PROCEDURE — 82746 ASSAY OF FOLIC ACID SERUM: CPT

## 2021-07-27 PROCEDURE — 93976 VASCULAR STUDY: CPT

## 2021-07-27 PROCEDURE — 83550 IRON BINDING TEST: CPT

## 2021-07-27 PROCEDURE — 71046 X-RAY EXAM CHEST 2 VIEWS: CPT

## 2021-07-27 PROCEDURE — 93306 TTE W/DOPPLER COMPLETE: CPT

## 2021-07-27 PROCEDURE — 83880 ASSAY OF NATRIURETIC PEPTIDE: CPT

## 2021-07-27 PROCEDURE — 83036 HEMOGLOBIN GLYCOSYLATED A1C: CPT

## 2021-07-27 PROCEDURE — 80053 COMPREHEN METABOLIC PANEL: CPT

## 2021-07-27 PROCEDURE — 82607 VITAMIN B-12: CPT

## 2021-07-27 PROCEDURE — 93005 ELECTROCARDIOGRAM TRACING: CPT

## 2021-07-27 PROCEDURE — 36415 COLL VENOUS BLD VENIPUNCTURE: CPT

## 2021-07-27 PROCEDURE — 71275 CT ANGIOGRAPHY CHEST: CPT | Mod: MA

## 2021-07-27 PROCEDURE — 84100 ASSAY OF PHOSPHORUS: CPT

## 2021-07-27 PROCEDURE — 82553 CREATINE MB FRACTION: CPT

## 2021-07-27 PROCEDURE — 85379 FIBRIN DEGRADATION QUANT: CPT

## 2021-07-27 PROCEDURE — 99285 EMERGENCY DEPT VISIT HI MDM: CPT | Mod: 25

## 2021-07-27 PROCEDURE — 80048 BASIC METABOLIC PNL TOTAL CA: CPT

## 2021-07-27 PROCEDURE — U0003: CPT

## 2021-07-27 PROCEDURE — 86769 SARS-COV-2 COVID-19 ANTIBODY: CPT

## 2021-07-27 RX ORDER — OXYBUTYNIN CHLORIDE 5 MG
1 TABLET ORAL
Qty: 0 | Refills: 0 | DISCHARGE
Start: 2021-07-27

## 2021-07-27 RX ORDER — VENLAFAXINE HCL 75 MG
0 CAPSULE, EXT RELEASE 24 HR ORAL
Qty: 0 | Refills: 5 | DISCHARGE

## 2021-07-27 RX ORDER — OXYBUTYNIN CHLORIDE 5 MG
0 TABLET ORAL
Qty: 0 | Refills: 11 | DISCHARGE

## 2021-07-27 RX ORDER — DOXAZOSIN MESYLATE 4 MG
1 TABLET ORAL
Qty: 0 | Refills: 0 | DISCHARGE
Start: 2021-07-27

## 2021-07-27 RX ORDER — ATORVASTATIN CALCIUM 80 MG/1
1 TABLET, FILM COATED ORAL
Qty: 0 | Refills: 0 | DISCHARGE
Start: 2021-07-27

## 2021-07-27 RX ORDER — DOXEPIN HCL 100 MG
0 CAPSULE ORAL
Qty: 0 | Refills: 5 | DISCHARGE

## 2021-07-27 RX ORDER — AMLODIPINE BESYLATE 2.5 MG/1
1 TABLET ORAL
Qty: 0 | Refills: 0 | DISCHARGE
Start: 2021-07-27

## 2021-07-27 RX ORDER — MIRTAZAPINE 45 MG/1
1 TABLET, ORALLY DISINTEGRATING ORAL
Qty: 0 | Refills: 0 | DISCHARGE
Start: 2021-07-27

## 2021-07-27 RX ORDER — LABETALOL HCL 100 MG
1 TABLET ORAL
Qty: 0 | Refills: 0 | DISCHARGE
Start: 2021-07-27

## 2021-07-27 RX ORDER — ASPIRIN/CALCIUM CARB/MAGNESIUM 324 MG
1 TABLET ORAL
Qty: 30 | Refills: 0
Start: 2021-07-27 | End: 2021-08-25

## 2021-07-27 RX ORDER — ARIPIPRAZOLE 15 MG/1
0 TABLET ORAL
Qty: 0 | Refills: 3 | DISCHARGE

## 2021-07-27 RX ORDER — ARIPIPRAZOLE 15 MG/1
1 TABLET ORAL
Qty: 0 | Refills: 0 | DISCHARGE

## 2021-07-27 RX ORDER — DOXAZOSIN MESYLATE 4 MG
0 TABLET ORAL
Qty: 0 | Refills: 2 | DISCHARGE

## 2021-07-27 RX ORDER — DOXEPIN HCL 100 MG
1 CAPSULE ORAL
Qty: 0 | Refills: 0 | DISCHARGE
Start: 2021-07-27

## 2021-07-27 RX ORDER — LAMOTRIGINE 25 MG/1
0 TABLET, ORALLY DISINTEGRATING ORAL
Qty: 0 | Refills: 5 | DISCHARGE

## 2021-07-27 RX ORDER — HYDRALAZINE HCL 50 MG
1 TABLET ORAL
Qty: 0 | Refills: 0 | DISCHARGE
Start: 2021-07-27

## 2021-07-27 RX ORDER — CIPROFLOXACIN LACTATE 400MG/40ML
1 VIAL (ML) INTRAVENOUS
Qty: 8 | Refills: 0
Start: 2021-07-27 | End: 2021-07-30

## 2021-07-27 RX ORDER — LABETALOL HCL 100 MG
1 TABLET ORAL
Qty: 90 | Refills: 0
Start: 2021-07-27 | End: 2021-08-25

## 2021-07-27 RX ORDER — CIPROFLOXACIN LACTATE 400MG/40ML
1 VIAL (ML) INTRAVENOUS
Qty: 0 | Refills: 0 | DISCHARGE
Start: 2021-07-27

## 2021-07-27 RX ORDER — LACTOBACILLUS ACIDOPHILUS 100MM CELL
1 CAPSULE ORAL
Qty: 8 | Refills: 0
Start: 2021-07-27 | End: 2021-07-30

## 2021-07-27 RX ORDER — METRONIDAZOLE 500 MG
1 TABLET ORAL
Qty: 12 | Refills: 0
Start: 2021-07-27 | End: 2021-07-30

## 2021-07-27 RX ORDER — METRONIDAZOLE 500 MG
1 TABLET ORAL
Qty: 0 | Refills: 0 | DISCHARGE
Start: 2021-07-27

## 2021-07-27 RX ORDER — FENOFIBRATE,MICRONIZED 130 MG
0 CAPSULE ORAL
Qty: 0 | Refills: 11 | DISCHARGE

## 2021-07-27 RX ORDER — CHOLECALCIFEROL (VITAMIN D3) 125 MCG
5000 CAPSULE ORAL
Qty: 0 | Refills: 0 | DISCHARGE
Start: 2021-07-27

## 2021-07-27 RX ORDER — VENLAFAXINE HCL 75 MG
1 CAPSULE, EXT RELEASE 24 HR ORAL
Qty: 0 | Refills: 0 | DISCHARGE
Start: 2021-07-27

## 2021-07-27 RX ORDER — FENOFIBRATE,MICRONIZED 130 MG
1 CAPSULE ORAL
Qty: 0 | Refills: 0 | DISCHARGE
Start: 2021-07-27

## 2021-07-27 RX ORDER — HYDRALAZINE HCL 50 MG
1 TABLET ORAL
Qty: 0 | Refills: 0 | DISCHARGE

## 2021-07-27 RX ORDER — CHOLECALCIFEROL (VITAMIN D3) 125 MCG
0 CAPSULE ORAL
Qty: 0 | Refills: 4 | DISCHARGE

## 2021-07-27 RX ORDER — LAMOTRIGINE 25 MG/1
1 TABLET, ORALLY DISINTEGRATING ORAL
Qty: 0 | Refills: 0 | DISCHARGE
Start: 2021-07-27

## 2021-07-27 RX ORDER — ARIPIPRAZOLE 15 MG/1
1 TABLET ORAL
Qty: 0 | Refills: 0 | DISCHARGE
Start: 2021-07-27

## 2021-07-27 RX ORDER — ASPIRIN/CALCIUM CARB/MAGNESIUM 324 MG
1 TABLET ORAL
Qty: 0 | Refills: 0 | DISCHARGE
Start: 2021-07-27

## 2021-07-27 RX ORDER — MIRTAZAPINE 45 MG/1
0 TABLET, ORALLY DISINTEGRATING ORAL
Qty: 0 | Refills: 5 | DISCHARGE

## 2021-07-27 RX ORDER — MELOXICAM 15 MG/1
1 TABLET ORAL
Qty: 0 | Refills: 0 | DISCHARGE

## 2021-07-27 RX ADMIN — Medication 0.1 MILLIGRAM(S): at 05:28

## 2021-07-27 RX ADMIN — Medication 0.1 MILLIGRAM(S): at 13:26

## 2021-07-27 RX ADMIN — Medication 100 MILLIGRAM(S): at 13:26

## 2021-07-27 RX ADMIN — Medication 500 MILLIGRAM(S): at 05:28

## 2021-07-27 RX ADMIN — Medication 150 MILLIGRAM(S): at 11:12

## 2021-07-27 RX ADMIN — Medication 300 MILLIGRAM(S): at 05:28

## 2021-07-27 RX ADMIN — Medication 5 MILLIGRAM(S): at 05:28

## 2021-07-27 RX ADMIN — ENOXAPARIN SODIUM 40 MILLIGRAM(S): 100 INJECTION SUBCUTANEOUS at 13:01

## 2021-07-27 RX ADMIN — Medication 5000 UNIT(S): at 11:12

## 2021-07-27 RX ADMIN — POLYETHYLENE GLYCOL 3350 17 GRAM(S): 17 POWDER, FOR SOLUTION ORAL at 11:13

## 2021-07-27 RX ADMIN — LAMOTRIGINE 100 MILLIGRAM(S): 25 TABLET, ORALLY DISINTEGRATING ORAL at 11:13

## 2021-07-27 RX ADMIN — AMLODIPINE BESYLATE 10 MILLIGRAM(S): 2.5 TABLET ORAL at 05:28

## 2021-07-27 RX ADMIN — Medication 81 MILLIGRAM(S): at 11:12

## 2021-07-27 RX ADMIN — ARIPIPRAZOLE 30 MILLIGRAM(S): 15 TABLET ORAL at 11:13

## 2021-07-27 RX ADMIN — Medication 145 MILLIGRAM(S): at 11:12

## 2021-07-27 RX ADMIN — Medication 4 MILLIGRAM(S): at 05:28

## 2021-07-27 RX ADMIN — PANTOPRAZOLE SODIUM 40 MILLIGRAM(S): 20 TABLET, DELAYED RELEASE ORAL at 05:28

## 2021-07-27 RX ADMIN — Medication 500 MILLIGRAM(S): at 13:26

## 2021-07-27 RX ADMIN — Medication 300 MILLIGRAM(S): at 13:26

## 2021-07-27 RX ADMIN — Medication 5 MILLIGRAM(S): at 13:26

## 2021-07-27 RX ADMIN — Medication 100 MILLIGRAM(S): at 05:28

## 2021-07-27 NOTE — PROGRESS NOTE ADULT - REASON FOR ADMISSION
dyspnea, uncontrolled HTN, elevated troponin

## 2021-07-27 NOTE — PROGRESS NOTE ADULT - ATTENDING COMMENTS
Chart reviewed    Patient seen and examined    Agree with plan as outlined above    79 y/o M with PMH of NIDDM2, CAD s/p stents 4 years ago (on plavix), mild AS, Lupus, HTN, HepC, HLD, diverticulosis, Hx of blood clots in brain (s/p surgery 2013) presented to the ED with complaints of elevated BP x 2 months and SOB.     SOB/ Hypertension urgency   - Patient with history HTN and CAD presents with SOB x 2 days  - BNP:  <--1995 No meaningful evidence of volume overload, may be elevated in setting of prolonged HTN  - ECHO 11/30/21 showed normal LV and RV  size and systolic function, estimated LVEF of 65%. Mild LVH Mild AS mild MR  - CTA chest negative for PE  - BP: 162/77  -continue labetalol now increased to 300mg yesterday   - continue with Amlodipine 10 mg QD, Hydralazine 100 mg Q8h,   -seen by renal, cozaar dcd for worsening renal function   - Low sodium DASH diet  - Daily weight     Elevated Troponin  - Denies chest pain presently with history CAD s/p stents  - Troponin I: 0.074<--.096, <--.067, <--.068, peaked, likely in the setting of hypertensive urgency   - EKG SR HR @ 70 bpm  - Echo 7/20/21- normal lv ef 60%, mild mr, trace tr   - Continue statin , ASA   -can dc tele monitoring    id  abx as per primary
Chart reviewed    Patient seen and examined    Agree with plan as outlined above    79 y/o M with PMH of NIDDM2, CAD s/p stents 4 years ago (on plavix), mild AS, Lupus, HTN, HepC, HLD, diverticulosis, Hx of blood clots in brain (s/p surgery 2013) presented to the ED with complaints of elevated BP x 2 months and SOB.     SOB/ Hypertension urgency   - Patient with history HTN and CAD presents with SOB x 2 days  - BNP:  <--1995 No meaningful evidence of volume overload, may be elevated in setting of prolonged HTN  - ECHO 11/30/21 showed normal LV and RV  size and systolic function, estimated LVEF of 65%. Mild LVH Mild AS mild MR  - CTA chest negative for PE  - BP: 168./88   - Received Hydralazine 20 mg IV x 1 in ED  - Labetalol increased yesterday  repeat bp this am after am medication administration   - continue with Amlodipine 10 mg QD, Hydralazine 100 mg Q8h, doxazosin, cozaar added   - Low sodium DASH diet  - Daily weight     Elevated Troponin  - Denies chest pain presently with history CAD s/p stents  - Troponin I: 0.074<--.096, <--.067, <--.068, peaked, likely in the setting of hypertensive urgency   - EKG SR HR @ 70 bpm  - Echo 7/20/21- normal lv ef 60%, mild mr, trace tr   - Continue statin , ASA
chart reviewed    Patient seen and examined    Agree with plan as outlined above    77 y/o M with PMH of NIDDM2, CAD s/p stents 4 years ago (on plavix), mild AS, Lupus, HTN, HepC, HLD, diverticulosis, Hx of blood clots in brain (s/p surgery 2013) presented to the ED with complaints of elevated BP x 2 months and SOB.     SOB/ Hypertension urgency   - Patient with history HTN and CAD presents with SOB x 2 days  - BNP:  <--1995 No meaningful evidence of volume overload, may be elevated in setting of prolonged HTN  - ECHO 11/30/21 showed normal LV and RV  size and systolic function, estimated LVEF of 65%. Mild LVH Mild AS mild MR  - CTA chest negative for PE  - BP: 153/90  - Received Hydralazine 20 mg IV x 1 in ED  -continue labetalol   - continue with Amlodipine 10 mg QD, Hydralazine 100 mg Q8h, doxazosin,   -seen by renal, cozaar dcd for worsening renal function   - Low sodium DASH diet  - Daily weight
suboptimally controlled htn  some med administration limited by bradycardia  dc planning with completion of abx
Atypical chest pain with no sig trop elevation, ekg changes, or telemetry events.  Outpatient ischemia testing.   bp remains high. titrate up labetalol to 200 tid as tolerated by HR
Pt complaining of orthopnea. He has mild basilar crackles on exam with elevated BNP. Would give lasix 40mg IV x 1.
severe htn  demand ischemia in that setting  remains with severe htn, labetolol increased, and can be further increased pending clinical course
Atypical chest pain with no sig trop elevation, ekg changes, or telemetry events.  Outpatient ischemia testing.
Patient seen and examined at bedside. BP better controlled, however, patient complaining of abdominal discomfort and pain. States it resolves on its own, but can be severe in nature. States pain is located in epigastric region. CT abd/pelvis done showing uncomplicated sigmoid diverticulitis, will start cipro/flagyl.
Patient seen and examined at bedside. BP medications being adjusted as per Cardio. Discussed with Nephro, agreeable to start ARB to help control BP.
Patient seen and examined at bedside. States he feels well, denies any CP, abd pain. Complains of occasional SOB when laying down.  Patient medically optimized for discharge, but as per [facility], cannot accept patient until 7/26/21. Will continue to coordinate dispo plan with [facility].
Patient seen and examined at bedside. States he feels the same. Still with some tenderness in LLQ. Denies any CP, SOB during my exam. BP still elevated, Cr rising, so will stop losartan and increase labetalol to 300mg TID with hold parameters.
Patient seen and examined at bedside. States he still has orthopnea and now with crackles on exam. Discussed with cardio, will give trial of lasix. Patient still with elevated Cr, but may be due to increased renal congestion. Appreciate Nephro recs. Patient still with HTN, SBP 160s, will add clonidine and monitor BP.
Pt seen and examined bedside, has no complaints. has no headache, N/V.  BP is better controlled. continue PO labetalol, hydralazine and amlodipine. check 2D ECHO. F/U with Cardio.  has iron def anemia, may give IV venofer.   Other management as above. d/w residents on rounds
Patient seen and examined at bedside. States he feels well. Patient with worsening renal indices today, encouraged PO water intake. LLQ pain improved, continue PO abx for diverticulitis. Hope to D/C in AM if renal indices improving.

## 2021-07-27 NOTE — PROGRESS NOTE ADULT - ASSESSMENT
76 male with a history of HTN, CAD, CHF, HLD, DM. PVD, SLE, Hepatitis C, depression and CKD now with admitted for accelerated HTN and possible ACS. S/P IV Contrast dye.     Renal indices improving. To continue current meds. Monitor BP trend. Titrate BP meds as needed. Salt restriction.   D/w patient regarding need for out patient nephrology follow up. Avoid nephrotoxic meds as possible. D/c planning.

## 2021-07-27 NOTE — PROGRESS NOTE ADULT - SUBJECTIVE AND OBJECTIVE BOX
Patient seen and examined at bedside. States he feels well today. States he is able to lay flat, but still with some orthopnea. Patient's Cr downtrending, discussed with Cardio, would hold off further diuresis. Discussed with Nephro, as Cr downtrending, stable for D/C with close outpatient follow up. Continue abx for diverticulitis. BP better controlled with addition of clonidine. Patient to be discharged back to group home.    Physical Exam:  Gen: well appearing, NAD  HEENT: NCAT, PEERLA b/l, EOMI b/l, no conjunctival erythema  Cardio: regular rate and rhythm, +s1s2, no murmurs, rubs, or gallops.   Pulm: crackles b/l lung bases  Abdomen: soft, nontender, nondistended, +BS x4 quadrants, no guarding  Extremities: trace edema in LE b/l  Neuro: AAOx3  Skin: warm and dry    Please refer to updated D/C note for further details.

## 2021-07-27 NOTE — PROGRESS NOTE ADULT - PROVIDER SPECIALTY LIST ADULT
Hospitalist
Hospitalist
Nephrology
Cardiology
Hospitalist
Cardiology
Nephrology
Cardiology
Cardiology
Nephrology
Cardiology
Nephrology
Cardiology
Hospitalist

## 2021-07-27 NOTE — PROGRESS NOTE ADULT - ASSESSMENT
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     SOB/ Hypertension urgency   -sp lasix yesterday negative 1liter 24 hours , now laying flat with no orthopnea, would hold off on further diuretics given ARIELLA   - ECHO 11/30/21 showed normal LV and RV  size and systolic function, estimated LVEF of 65%. Mild LVH Mild AS mild MR  - Repeat TTE showed normal LV/RVF with no significant valvular disease  - CTA chest negative for PE  - BP remains elevated up to 160 systolic  - Continue labetalol at 300 mg q8H.  Unable to increase due to bradycardia  - Continue with Amlodipine 10 mg QD and Hydralazine 100 mg Q8h  - Off ARB for worsening renal function  - Now on Clonidine.  Increase as needed but would be watchful with bradycardia   - Low sodium DASH diet    Elevated Troponin  - Troponin I: 0.074<--.096, <--.067, <--.068, peaked, likely in the setting of hypertensive urgency   - EKG SR HR @ 70 bpm  - TTE as above  - Continue ASA and statin    DVT ppx  - Per Primary    - Monitor and replete Lytes. Keep K > 4 and Mg > 2    Carmelina Leger FNP-C  Cardiology NP  [id number] [phone]

## 2021-07-27 NOTE — DISCHARGE NOTE NURSING/CASE MANAGEMENT/SOCIAL WORK - PATIENT PORTAL LINK FT
You can access the FollowMyHealth Patient Portal offered by [facility] by registering at the following website: [email or web address]. By joining [employer]’s FollowMyHealth portal, you will also be able to view your health information using other applications (apps) compatible with our system.

## 2021-07-27 NOTE — DISCHARGE NOTE NURSING/CASE MANAGEMENT/SOCIAL WORK - NSDCVIVACCINE_GEN_ALL_CORE_FT
Tdap; 24-Jul-2020 09:31; Valdez Watson (RN); [employer]; [id number] (Exp. Date: 17-Sep-2021); IntraMuscular; Deltoid Left.; 0.5 milliLiter(s); VIS (VIS Published: 09-May-2013, VIS Presented: 24-Jul-2020);

## 2021-07-27 NOTE — PROGRESS NOTE ADULT - SUBJECTIVE AND OBJECTIVE BOX
Patient is a 76y old  Male who presents with a chief complaint of dyspnea, uncontrolled HTN, elevated troponin (26 Jul 2021 12:43)  Patient seen in follow up for CKD.        PAST MEDICAL HISTORY:  Hypertension    Diabetes mellitus    Lupus    Hepatitis C    Anxiety and depression    CAD (coronary artery disease)    Diverticulosis    Hyperlipidemia      MEDICATIONS  (STANDING):  amLODIPine   Tablet 10 milliGRAM(s) Oral daily  ARIPiprazole 30 milliGRAM(s) Oral daily  aspirin enteric coated 81 milliGRAM(s) Oral daily  atorvastatin 40 milliGRAM(s) Oral at bedtime  cholecalciferol 5000 Unit(s) Oral daily  ciprofloxacin     Tablet 500 milliGRAM(s) Oral every 12 hours  cloNIDine 0.1 milliGRAM(s) Oral three times a day  dextrose 40% Gel 15 Gram(s) Oral once  dextrose 5%. 1000 milliLiter(s) (50 mL/Hr) IV Continuous <Continuous>  dextrose 5%. 1000 milliLiter(s) (100 mL/Hr) IV Continuous <Continuous>  dextrose 50% Injectable 25 Gram(s) IV Push once  dextrose 50% Injectable 12.5 Gram(s) IV Push once  dextrose 50% Injectable 25 Gram(s) IV Push once  doxazosin 4 milliGRAM(s) Oral <User Schedule>  doxepin 25 milliGRAM(s) Oral at bedtime  enoxaparin Injectable 40 milliGRAM(s) SubCutaneous daily  fenofibrate Tablet 145 milliGRAM(s) Oral daily  glucagon  Injectable 1 milliGRAM(s) IntraMuscular once  hydrALAZINE 100 milliGRAM(s) Oral three times a day  insulin lispro (ADMELOG) corrective regimen sliding scale   SubCutaneous three times a day before meals  insulin lispro (ADMELOG) corrective regimen sliding scale   SubCutaneous at bedtime  labetalol 300 milliGRAM(s) Oral three times a day  lamoTRIgine 100 milliGRAM(s) Oral daily  metroNIDAZOLE    Tablet 500 milliGRAM(s) Oral every 8 hours  mirtazapine 30 milliGRAM(s) Oral at bedtime  oxybutynin 5 milliGRAM(s) Oral three times a day  pantoprazole    Tablet 40 milliGRAM(s) Oral before breakfast  polyethylene glycol 3350 17 Gram(s) Oral daily  venlafaxine XR. 150 milliGRAM(s) Oral daily    MEDICATIONS  (PRN):  acetaminophen   Tablet .. 650 milliGRAM(s) Oral every 6 hours PRN Temp greater or equal to 38.5C (101.3F), Mild Pain (1 - 3)  melatonin 3 milliGRAM(s) Oral at bedtime PRN Insomnia    T(C): 36.3 (07-27-21 @ 04:42), Max: 36.7 (07-26-21 @ 20:22)  HR: 56 (07-27-21 @ 04:42) (51 - 60)  BP: 168/87 (07-27-21 @ 04:42) (133/69 - 168/87)  RR: 18 (07-27-21 @ 04:42)  SpO2: 94% (07-27-21 @ 04:42)  Wt(kg): --  I&O's Detail    26 Jul 2021 07:01  -  27 Jul 2021 07:00  --------------------------------------------------------  IN:    Oral Fluid: 200 mL  Total IN: 200 mL    OUT:    Voided (mL): 1200 mL  Total OUT: 1200 mL    Total NET: -1000 mL          PHYSICAL EXAM:  General: No distress  Respiratory: b/l air entry  Cardiovascular: S1 S2  Gastrointestinal: soft  Extremities:  no edema                            LABORATORY:                        8.8    7.03  )-----------( 248      ( 27 Jul 2021 07:18 )             28.4     07-27    141  |  107  |  39<H>  ----------------------------<  97  3.9   |  27  |  2.10<H>    Ca    8.6      27 Jul 2021 07:18      Sodium, Serum: 141 mmol/L (07-27 @ 07:18)  Sodium, Serum: 140 mmol/L (07-26 @ 07:46)    Potassium, Serum: 3.9 mmol/L (07-27 @ 07:18)  Potassium, Serum: 3.7 mmol/L (07-26 @ 07:46)    Hemoglobin: 8.8 g/dL (07-27 @ 07:18)  Hemoglobin: 8.7 g/dL (07-26 @ 07:46)    Creatinine, Serum 2.10 (07-27 @ 07:18)  Creatinine, Serum 2.30 (07-26 @ 07:46)  Creatinine, Serum 2.30 (07-25 @ 06:55)

## 2021-07-27 NOTE — PROGRESS NOTE ADULT - SUBJECTIVE AND OBJECTIVE BOX
[facility] -- Fifi Bliss, Bonifacio, Kalpesh, Abraham Sheridan Savella  Office # [phone]      Follow Up:  htn     Subjective/Observations:   No events overnight resting comfortably in bed, sleeping but arousable  .  No complaints of chest pain, dyspnea, or palpitations reported. No signs of orthopnea or PND.      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  Had surgery ( April 2013 )    S/P tonsillectomy    S/P arthroscopic knee surgery  Bilateral ( 2005 )    Torsion of testicle  Had surgery at age 14    Pilonidal cyst  Had surgery ( 1969 )    S/P cataract surgery  Bilateral    H/O hernia repair        MEDICATIONS  (STANDING):  amLODIPine   Tablet 10 milliGRAM(s) Oral daily  ARIPiprazole 30 milliGRAM(s) Oral daily  aspirin enteric coated 81 milliGRAM(s) Oral daily  atorvastatin 40 milliGRAM(s) Oral at bedtime  cholecalciferol 5000 Unit(s) Oral daily  ciprofloxacin     Tablet 500 milliGRAM(s) Oral every 12 hours  cloNIDine 0.1 milliGRAM(s) Oral three times a day  dextrose 40% Gel 15 Gram(s) Oral once  dextrose 5%. 1000 milliLiter(s) (50 mL/Hr) IV Continuous <Continuous>  dextrose 5%. 1000 milliLiter(s) (100 mL/Hr) IV Continuous <Continuous>  dextrose 50% Injectable 25 Gram(s) IV Push once  dextrose 50% Injectable 12.5 Gram(s) IV Push once  dextrose 50% Injectable 25 Gram(s) IV Push once  doxazosin 4 milliGRAM(s) Oral <User Schedule>  doxepin 25 milliGRAM(s) Oral at bedtime  enoxaparin Injectable 40 milliGRAM(s) SubCutaneous daily  fenofibrate Tablet 145 milliGRAM(s) Oral daily  glucagon  Injectable 1 milliGRAM(s) IntraMuscular once  hydrALAZINE 100 milliGRAM(s) Oral three times a day  insulin lispro (ADMELOG) corrective regimen sliding scale   SubCutaneous three times a day before meals  insulin lispro (ADMELOG) corrective regimen sliding scale   SubCutaneous at bedtime  labetalol 300 milliGRAM(s) Oral three times a day  lamoTRIgine 100 milliGRAM(s) Oral daily  metroNIDAZOLE    Tablet 500 milliGRAM(s) Oral every 8 hours  mirtazapine 30 milliGRAM(s) Oral at bedtime  oxybutynin 5 milliGRAM(s) Oral three times a day  pantoprazole    Tablet 40 milliGRAM(s) Oral before breakfast  polyethylene glycol 3350 17 Gram(s) Oral daily  venlafaxine XR. 150 milliGRAM(s) Oral daily    MEDICATIONS  (PRN):  acetaminophen   Tablet .. 650 milliGRAM(s) Oral every 6 hours PRN Temp greater or equal to 38.5C (101.3F), Mild Pain (1 - 3)  melatonin 3 milliGRAM(s) Oral at bedtime PRN Insomnia      Allergies    No Known Allergies    Intolerances        Vital Signs Last 24 Hrs  T(C): 36.3 (27 Jul 2021 04:42), Max: 36.7 (26 Jul 2021 20:22)  T(F): 97.4 (27 Jul 2021 04:42), Max: 98 (26 Jul 2021 20:22)  HR: 56 (27 Jul 2021 04:42) (55 - 57)  BP: 168/87 (27 Jul 2021 04:42) (133/69 - 168/87)  BP(mean): --  RR: 18 (27 Jul 2021 04:42) (17 - 18)  SpO2: 94% (27 Jul 2021 04:42) (91% - 94%)    I&O's Summary    26 Jul 2021 07:01  -  27 Jul 2021 07:00  --------------------------------------------------------  IN: 200 mL / OUT: 1200 mL / NET: -1000 mL          PHYSICAL EXAM:  TELE: not on tele   Constitutional: NAD, awake and alert, obese   HEENT: Moist Mucous Membranes, Anicteric  Pulmonary: Non-labored, breath sounds are diminished   Cardiovascular: Regular, S1 and S2 nl, murmur   Gastrointestinal: Bowel Sounds present, soft, nontender.   Lymph: No lymphadenopathy. No peripheral edema.  Skin: No visible rashes or ulcers.  Psych:  Mood & affect appropriate    LABS: All Labs Reviewed:                        8.8    7.03  )-----------( 248      ( 27 Jul 2021 07:18 )             28.4                         8.7    7.80  )-----------( 250      ( 26 Jul 2021 07:46 )             28.5     27 Jul 2021 07:18    141    |  107    |  39     ----------------------------<  97     3.9     |  27     |  2.10   26 Jul 2021 07:46    140    |  108    |  43     ----------------------------<  116    3.7     |  25     |  2.30   25 Jul 2021 06:55    140    |  107    |  41     ----------------------------<  106    3.8     |  25     |  2.30     Ca    8.6        27 Jul 2021 07:18  Ca    8.5        26 Jul 2021 07:46  Ca    8.6        25 Jul 2021 06:55      ECHO TTE WO CON COMP W DOPP         PROCEDURE DATE:  07/20/2021        INTERPRETATION:  INDICATION: Dyspnea  Sonographer KL    Blood Pressure 199/92    Height 172.7cm     Weight 99.8kg       BSA 2.13m sq    Dimensions:  LA 4.2       Normal Values: 2.0 - 4.0 cm  Ao 4.0        Normal Values: 2.0 - 3.8 cm  SEPTUM 1.7       Normal Values: 0.6 - 1.2 cm  PWT 1.7       Normal Values: 0.6 - 1.1 cm  LVIDd 5.2         Normal Values: 3.0 - 5.6 cm  LVIDs 3.8         Normal Values: 1.8 - 4.0 cm      OBSERVATIONS:  Mitral Valve: Thickened leaflets, mild MR.  Aortic Valve/Aorta: Calcified trileaflet aortic valve with decreased opening. Peak transaortic valve gradient is 28 mmHg with a mean transaortic valve gradient of 16 mmHg. Aortic valve area is calculus be 1.8 sq cm. This consistent with mild aortic stenosis.  Tricuspid Valve: normal with trace TR.  Pulmonic Valve: Not well-visualized  Left Atrium: Enlarged  Right Atrium: Not well-visualized  Left Ventricle: Normal left ventricular systolic function, estimated LVEF of 60%.  Right Ventricle: Grossly normal size and systolic function.  Pericardium: no significant pericardial effusion.  LV diastolic dysfunction is present    IMPRESSION:  Normal left ventricular systolic function, estimated LVEF of 60%.  Grossly normal RV size and systolic function.  Left atrial enlargement  Calcified trileaflet aortic valve with mild aortic stenosis, without AI.  Mild MR  Trace TR.  No significant pericardial effusion.    --- End of Report ---      VIET GREENWOOD MD; Attending Cardiologist  This document has been electronically signed. Jul 21 2021 12:31PM    EXAM:  XR CHEST PORTABLE URGENT 1V                          PROCEDURE DATE:  07/23/2021      INTERPRETATION:  CLINICAL STATEMENT: Chest pain.    TECHNIQUE: AP view of the chest.    COMPARISON: 7/19/2021    FINDINGS/  IMPRESSION:  No consolidation or pleural effusion    Heart size cannot be accurately assessed in this projection, but appear enlarged.    --- End of Report ---    JUANPABLO BRITO MD; Attending Radiologist  This document has been electronically signed. Jul 24 2021  9:49AM    Ventricular Rate 73 BPM    Atrial Rate 73 BPM    P-R Interval 256 ms    QRS Duration 114 ms    Q-T Interval 428 ms    QTC Calculation(Bazett) 471 ms    R Axis -14 degrees    T Axis 33 degrees    Diagnosis Line Sinus rhythm with 1st degree AV block  Nonspecific T wave abnormality  Abnormal ECG

## 2021-07-28 ENCOUNTER — INPATIENT (INPATIENT)
Facility: HOSPITAL | Age: 76
LOS: 1 days | Discharge: HOME | DRG: 305 | End: 2021-07-30
Attending: INTERNAL MEDICINE | Admitting: STUDENT IN AN ORGANIZED HEALTH CARE EDUCATION/TRAINING PROGRAM
Payer: MEDICARE

## 2021-07-28 VITALS
DIASTOLIC BLOOD PRESSURE: 90 MMHG | HEART RATE: 67 BPM | TEMPERATURE: 98 F | OXYGEN SATURATION: 87 % | RESPIRATION RATE: 24 BRPM | SYSTOLIC BLOOD PRESSURE: 214 MMHG | WEIGHT: 240.08 LBS | HEIGHT: 68 IN

## 2021-07-28 DIAGNOSIS — I66.9 OCCLUSION AND STENOSIS OF UNSPECIFIED CEREBRAL ARTERY: Chronic | ICD-10-CM

## 2021-07-28 DIAGNOSIS — R09.89 OTHER SPECIFIED SYMPTOMS AND SIGNS INVOLVING THE CIRCULATORY AND RESPIRATORY SYSTEMS: ICD-10-CM

## 2021-07-28 DIAGNOSIS — Z98.89 OTHER SPECIFIED POSTPROCEDURAL STATES: Chronic | ICD-10-CM

## 2021-07-28 DIAGNOSIS — L05.91 PILONIDAL CYST WITHOUT ABSCESS: Chronic | ICD-10-CM

## 2021-07-28 DIAGNOSIS — Z98.49 CATARACT EXTRACTION STATUS, UNSPECIFIED EYE: Chronic | ICD-10-CM

## 2021-07-28 DIAGNOSIS — N44.00 TORSION OF TESTIS, UNSPECIFIED: Chronic | ICD-10-CM

## 2021-07-28 DIAGNOSIS — R06.02 SHORTNESS OF BREATH: ICD-10-CM

## 2021-07-28 LAB
ALBUMIN SERPL ELPH-MCNC: 3.1 G/DL — LOW (ref 3.3–5)
ALBUMIN SERPL ELPH-MCNC: 3.5 G/DL — SIGNIFICANT CHANGE UP (ref 3.3–5)
ALP SERPL-CCNC: 31 U/L — LOW (ref 40–120)
ALP SERPL-CCNC: 34 U/L — LOW (ref 40–120)
ALT FLD-CCNC: 28 U/L — SIGNIFICANT CHANGE UP (ref 12–78)
ALT FLD-CCNC: 33 U/L — SIGNIFICANT CHANGE UP (ref 12–78)
ANION GAP SERPL CALC-SCNC: 7 MMOL/L — SIGNIFICANT CHANGE UP (ref 5–17)
ANION GAP SERPL CALC-SCNC: 9 MMOL/L — SIGNIFICANT CHANGE UP (ref 5–17)
AST SERPL-CCNC: 19 U/L — SIGNIFICANT CHANGE UP (ref 15–37)
AST SERPL-CCNC: 25 U/L — SIGNIFICANT CHANGE UP (ref 15–37)
BASOPHILS # BLD AUTO: 0.03 K/UL — SIGNIFICANT CHANGE UP (ref 0–0.2)
BASOPHILS # BLD AUTO: 0.03 K/UL — SIGNIFICANT CHANGE UP (ref 0–0.2)
BASOPHILS NFR BLD AUTO: 0.4 % — SIGNIFICANT CHANGE UP (ref 0–2)
BASOPHILS NFR BLD AUTO: 0.4 % — SIGNIFICANT CHANGE UP (ref 0–2)
BILIRUB SERPL-MCNC: 0.3 MG/DL — SIGNIFICANT CHANGE UP (ref 0.2–1.2)
BILIRUB SERPL-MCNC: 0.4 MG/DL — SIGNIFICANT CHANGE UP (ref 0.2–1.2)
BUN SERPL-MCNC: 37 MG/DL — HIGH (ref 7–23)
BUN SERPL-MCNC: 39 MG/DL — HIGH (ref 7–23)
CALCIUM SERPL-MCNC: 8.1 MG/DL — LOW (ref 8.5–10.1)
CALCIUM SERPL-MCNC: 8.6 MG/DL — SIGNIFICANT CHANGE UP (ref 8.5–10.1)
CHLORIDE SERPL-SCNC: 106 MMOL/L — SIGNIFICANT CHANGE UP (ref 96–108)
CHLORIDE SERPL-SCNC: 106 MMOL/L — SIGNIFICANT CHANGE UP (ref 96–108)
CK MB BLD-MCNC: 1.9 % — SIGNIFICANT CHANGE UP (ref 0–3.5)
CK MB BLD-MCNC: 2.3 % — SIGNIFICANT CHANGE UP (ref 0–3.5)
CK MB CFR SERPL CALC: 2.5 NG/ML — SIGNIFICANT CHANGE UP (ref 0–3.6)
CK MB CFR SERPL CALC: 2.8 NG/ML — SIGNIFICANT CHANGE UP (ref 0–3.6)
CK SERPL-CCNC: 123 U/L — SIGNIFICANT CHANGE UP (ref 26–308)
CK SERPL-CCNC: 130 U/L — SIGNIFICANT CHANGE UP (ref 26–308)
CO2 SERPL-SCNC: 25 MMOL/L — SIGNIFICANT CHANGE UP (ref 22–31)
CO2 SERPL-SCNC: 27 MMOL/L — SIGNIFICANT CHANGE UP (ref 22–31)
CREAT SERPL-MCNC: 2.1 MG/DL — HIGH (ref 0.5–1.3)
CREAT SERPL-MCNC: 2.3 MG/DL — HIGH (ref 0.5–1.3)
EOSINOPHIL # BLD AUTO: 0.11 K/UL — SIGNIFICANT CHANGE UP (ref 0–0.5)
EOSINOPHIL # BLD AUTO: 0.18 K/UL — SIGNIFICANT CHANGE UP (ref 0–0.5)
EOSINOPHIL NFR BLD AUTO: 1.4 % — SIGNIFICANT CHANGE UP (ref 0–6)
EOSINOPHIL NFR BLD AUTO: 2.2 % — SIGNIFICANT CHANGE UP (ref 0–6)
GLUCOSE SERPL-MCNC: 132 MG/DL — HIGH (ref 70–99)
GLUCOSE SERPL-MCNC: 171 MG/DL — HIGH (ref 70–99)
HCT VFR BLD CALC: 26.5 % — LOW (ref 39–50)
HCT VFR BLD CALC: 29.8 % — LOW (ref 39–50)
HGB BLD-MCNC: 8.2 G/DL — LOW (ref 13–17)
HGB BLD-MCNC: 9 G/DL — LOW (ref 13–17)
IMM GRANULOCYTES NFR BLD AUTO: 0.4 % — SIGNIFICANT CHANGE UP (ref 0–1.5)
IMM GRANULOCYTES NFR BLD AUTO: 0.5 % — SIGNIFICANT CHANGE UP (ref 0–1.5)
LYMPHOCYTES # BLD AUTO: 0.91 K/UL — LOW (ref 1–3.3)
LYMPHOCYTES # BLD AUTO: 1.29 K/UL — SIGNIFICANT CHANGE UP (ref 1–3.3)
LYMPHOCYTES # BLD AUTO: 10.9 % — LOW (ref 13–44)
LYMPHOCYTES # BLD AUTO: 16.9 % — SIGNIFICANT CHANGE UP (ref 13–44)
MCHC RBC-ENTMCNC: 25.2 PG — LOW (ref 27–34)
MCHC RBC-ENTMCNC: 25.8 PG — LOW (ref 27–34)
MCHC RBC-ENTMCNC: 30.2 GM/DL — LOW (ref 32–36)
MCHC RBC-ENTMCNC: 30.9 GM/DL — LOW (ref 32–36)
MCV RBC AUTO: 83.3 FL — SIGNIFICANT CHANGE UP (ref 80–100)
MCV RBC AUTO: 83.5 FL — SIGNIFICANT CHANGE UP (ref 80–100)
MONOCYTES # BLD AUTO: 0.49 K/UL — SIGNIFICANT CHANGE UP (ref 0–0.9)
MONOCYTES # BLD AUTO: 0.49 K/UL — SIGNIFICANT CHANGE UP (ref 0–0.9)
MONOCYTES NFR BLD AUTO: 5.9 % — SIGNIFICANT CHANGE UP (ref 2–14)
MONOCYTES NFR BLD AUTO: 6.4 % — SIGNIFICANT CHANGE UP (ref 2–14)
NEUTROPHILS # BLD AUTO: 5.7 K/UL — SIGNIFICANT CHANGE UP (ref 1.8–7.4)
NEUTROPHILS # BLD AUTO: 6.71 K/UL — SIGNIFICANT CHANGE UP (ref 1.8–7.4)
NEUTROPHILS NFR BLD AUTO: 74.5 % — SIGNIFICANT CHANGE UP (ref 43–77)
NEUTROPHILS NFR BLD AUTO: 80.1 % — HIGH (ref 43–77)
NRBC # BLD: 0 /100 WBCS — SIGNIFICANT CHANGE UP (ref 0–0)
NRBC # BLD: 0 /100 WBCS — SIGNIFICANT CHANGE UP (ref 0–0)
NT-PROBNP SERPL-SCNC: 2043 PG/ML — HIGH (ref 0–450)
PLATELET # BLD AUTO: 218 K/UL — SIGNIFICANT CHANGE UP (ref 150–400)
PLATELET # BLD AUTO: 251 K/UL — SIGNIFICANT CHANGE UP (ref 150–400)
POTASSIUM SERPL-MCNC: 3.8 MMOL/L — SIGNIFICANT CHANGE UP (ref 3.5–5.3)
POTASSIUM SERPL-MCNC: 4 MMOL/L — SIGNIFICANT CHANGE UP (ref 3.5–5.3)
POTASSIUM SERPL-SCNC: 3.8 MMOL/L — SIGNIFICANT CHANGE UP (ref 3.5–5.3)
POTASSIUM SERPL-SCNC: 4 MMOL/L — SIGNIFICANT CHANGE UP (ref 3.5–5.3)
PROT SERPL-MCNC: 6.2 G/DL — SIGNIFICANT CHANGE UP (ref 6–8.3)
PROT SERPL-MCNC: 6.9 G/DL — SIGNIFICANT CHANGE UP (ref 6–8.3)
RBC # BLD: 3.18 M/UL — LOW (ref 4.2–5.8)
RBC # BLD: 3.57 M/UL — LOW (ref 4.2–5.8)
RBC # FLD: 18.1 % — HIGH (ref 10.3–14.5)
RBC # FLD: 18.2 % — HIGH (ref 10.3–14.5)
SARS-COV-2 RNA SPEC QL NAA+PROBE: SIGNIFICANT CHANGE UP
SODIUM SERPL-SCNC: 140 MMOL/L — SIGNIFICANT CHANGE UP (ref 135–145)
SODIUM SERPL-SCNC: 140 MMOL/L — SIGNIFICANT CHANGE UP (ref 135–145)
TROPONIN I SERPL-MCNC: 0.03 NG/ML — SIGNIFICANT CHANGE UP (ref 0.01–0.04)
TROPONIN I SERPL-MCNC: 0.06 NG/ML — HIGH (ref 0.01–0.04)
WBC # BLD: 7.65 K/UL — SIGNIFICANT CHANGE UP (ref 3.8–10.5)
WBC # BLD: 8.36 K/UL — SIGNIFICANT CHANGE UP (ref 3.8–10.5)
WBC # FLD AUTO: 7.65 K/UL — SIGNIFICANT CHANGE UP (ref 3.8–10.5)
WBC # FLD AUTO: 8.36 K/UL — SIGNIFICANT CHANGE UP (ref 3.8–10.5)

## 2021-07-28 PROCEDURE — 93010 ELECTROCARDIOGRAM REPORT: CPT | Mod: 76

## 2021-07-28 PROCEDURE — 71045 X-RAY EXAM CHEST 1 VIEW: CPT | Mod: 26

## 2021-07-28 PROCEDURE — 99223 1ST HOSP IP/OBS HIGH 75: CPT | Mod: GC

## 2021-07-28 PROCEDURE — 99223 1ST HOSP IP/OBS HIGH 75: CPT

## 2021-07-28 PROCEDURE — 99285 EMERGENCY DEPT VISIT HI MDM: CPT

## 2021-07-28 RX ORDER — OXYBUTYNIN CHLORIDE 5 MG
5 TABLET ORAL EVERY 8 HOURS
Refills: 0 | Status: DISCONTINUED | OUTPATIENT
Start: 2021-07-28 | End: 2021-07-30

## 2021-07-28 RX ORDER — VENLAFAXINE HCL 75 MG
150 CAPSULE, EXT RELEASE 24 HR ORAL DAILY
Refills: 0 | Status: DISCONTINUED | OUTPATIENT
Start: 2021-07-28 | End: 2021-07-30

## 2021-07-28 RX ORDER — ARIPIPRAZOLE 15 MG/1
30 TABLET ORAL DAILY
Refills: 0 | Status: DISCONTINUED | OUTPATIENT
Start: 2021-07-28 | End: 2021-07-30

## 2021-07-28 RX ORDER — DOXAZOSIN MESYLATE 4 MG
4 TABLET ORAL AT BEDTIME
Refills: 0 | Status: DISCONTINUED | OUTPATIENT
Start: 2021-07-28 | End: 2021-07-30

## 2021-07-28 RX ORDER — ENOXAPARIN SODIUM 100 MG/ML
40 INJECTION SUBCUTANEOUS DAILY
Refills: 0 | Status: DISCONTINUED | OUTPATIENT
Start: 2021-07-28 | End: 2021-07-30

## 2021-07-28 RX ORDER — METFORMIN HYDROCHLORIDE 850 MG/1
1 TABLET ORAL
Qty: 0 | Refills: 11 | DISCHARGE

## 2021-07-28 RX ORDER — MIRTAZAPINE 45 MG/1
30 TABLET, ORALLY DISINTEGRATING ORAL AT BEDTIME
Refills: 0 | Status: DISCONTINUED | OUTPATIENT
Start: 2021-07-28 | End: 2021-07-30

## 2021-07-28 RX ORDER — CIPROFLOXACIN LACTATE 400MG/40ML
500 VIAL (ML) INTRAVENOUS EVERY 12 HOURS
Refills: 0 | Status: DISCONTINUED | OUTPATIENT
Start: 2021-07-28 | End: 2021-07-28

## 2021-07-28 RX ORDER — FENOFIBRATE,MICRONIZED 130 MG
145 CAPSULE ORAL DAILY
Refills: 0 | Status: DISCONTINUED | OUTPATIENT
Start: 2021-07-28 | End: 2021-07-30

## 2021-07-28 RX ORDER — ASPIRIN/CALCIUM CARB/MAGNESIUM 324 MG
81 TABLET ORAL DAILY
Refills: 0 | Status: DISCONTINUED | OUTPATIENT
Start: 2021-07-28 | End: 2021-07-30

## 2021-07-28 RX ORDER — CIPROFLOXACIN LACTATE 400MG/40ML
500 VIAL (ML) INTRAVENOUS EVERY 12 HOURS
Refills: 0 | Status: DISCONTINUED | OUTPATIENT
Start: 2021-07-28 | End: 2021-07-30

## 2021-07-28 RX ORDER — HYDRALAZINE HCL 50 MG
100 TABLET ORAL EVERY 8 HOURS
Refills: 0 | Status: DISCONTINUED | OUTPATIENT
Start: 2021-07-28 | End: 2021-07-28

## 2021-07-28 RX ORDER — LACTOBACILLUS ACIDOPHILUS 100MM CELL
1 CAPSULE ORAL DAILY
Refills: 0 | Status: DISCONTINUED | OUTPATIENT
Start: 2021-07-28 | End: 2021-07-30

## 2021-07-28 RX ORDER — OXYBUTYNIN CHLORIDE 5 MG
5 TABLET ORAL DAILY
Refills: 0 | Status: DISCONTINUED | OUTPATIENT
Start: 2021-07-28 | End: 2021-07-28

## 2021-07-28 RX ORDER — ICOSAPENT ETHYL 500 MG/1
2 CAPSULE, LIQUID FILLED ORAL
Qty: 0 | Refills: 5 | DISCHARGE

## 2021-07-28 RX ORDER — METRONIDAZOLE 500 MG
500 TABLET ORAL EVERY 8 HOURS
Refills: 0 | Status: COMPLETED | OUTPATIENT
Start: 2021-07-28 | End: 2021-07-30

## 2021-07-28 RX ORDER — LABETALOL HCL 100 MG
300 TABLET ORAL THREE TIMES A DAY
Refills: 0 | Status: DISCONTINUED | OUTPATIENT
Start: 2021-07-28 | End: 2021-07-30

## 2021-07-28 RX ORDER — HYDRALAZINE HCL 50 MG
10 TABLET ORAL ONCE
Refills: 0 | Status: COMPLETED | OUTPATIENT
Start: 2021-07-28 | End: 2021-07-28

## 2021-07-28 RX ORDER — LAMOTRIGINE 25 MG/1
100 TABLET, ORALLY DISINTEGRATING ORAL DAILY
Refills: 0 | Status: DISCONTINUED | OUTPATIENT
Start: 2021-07-28 | End: 2021-07-30

## 2021-07-28 RX ORDER — FUROSEMIDE 40 MG
40 TABLET ORAL ONCE
Refills: 0 | Status: COMPLETED | OUTPATIENT
Start: 2021-07-28 | End: 2021-07-28

## 2021-07-28 RX ORDER — PANTOPRAZOLE SODIUM 20 MG/1
40 TABLET, DELAYED RELEASE ORAL
Refills: 0 | Status: DISCONTINUED | OUTPATIENT
Start: 2021-07-28 | End: 2021-07-30

## 2021-07-28 RX ORDER — ACETAMINOPHEN 500 MG
650 TABLET ORAL EVERY 6 HOURS
Refills: 0 | Status: DISCONTINUED | OUTPATIENT
Start: 2021-07-28 | End: 2021-07-30

## 2021-07-28 RX ORDER — AMLODIPINE BESYLATE 2.5 MG/1
10 TABLET ORAL DAILY
Refills: 0 | Status: DISCONTINUED | OUTPATIENT
Start: 2021-07-28 | End: 2021-07-30

## 2021-07-28 RX ORDER — HYDRALAZINE HCL 50 MG
100 TABLET ORAL EVERY 6 HOURS
Refills: 0 | Status: DISCONTINUED | OUTPATIENT
Start: 2021-07-28 | End: 2021-07-30

## 2021-07-28 RX ORDER — METRONIDAZOLE 500 MG
500 TABLET ORAL EVERY 8 HOURS
Refills: 0 | Status: DISCONTINUED | OUTPATIENT
Start: 2021-07-28 | End: 2021-07-28

## 2021-07-28 RX ADMIN — Medication 40 MILLIGRAM(S): at 04:26

## 2021-07-28 RX ADMIN — Medication 10 MILLIGRAM(S): at 03:35

## 2021-07-28 RX ADMIN — Medication 100 MILLIGRAM(S): at 12:22

## 2021-07-28 RX ADMIN — MIRTAZAPINE 30 MILLIGRAM(S): 45 TABLET, ORALLY DISINTEGRATING ORAL at 21:05

## 2021-07-28 RX ADMIN — Medication 0.1 MILLIGRAM(S): at 13:06

## 2021-07-28 RX ADMIN — Medication 5 MILLIGRAM(S): at 21:05

## 2021-07-28 RX ADMIN — Medication 5 MILLIGRAM(S): at 13:06

## 2021-07-28 RX ADMIN — PANTOPRAZOLE SODIUM 40 MILLIGRAM(S): 20 TABLET, DELAYED RELEASE ORAL at 06:57

## 2021-07-28 RX ADMIN — Medication 4 MILLIGRAM(S): at 21:06

## 2021-07-28 RX ADMIN — Medication 500 MILLIGRAM(S): at 17:52

## 2021-07-28 RX ADMIN — Medication 81 MILLIGRAM(S): at 12:22

## 2021-07-28 RX ADMIN — Medication 100 MILLIGRAM(S): at 06:57

## 2021-07-28 RX ADMIN — Medication 650 MILLIGRAM(S): at 16:30

## 2021-07-28 RX ADMIN — Medication 500 MILLIGRAM(S): at 21:05

## 2021-07-28 RX ADMIN — ARIPIPRAZOLE 30 MILLIGRAM(S): 15 TABLET ORAL at 12:22

## 2021-07-28 RX ADMIN — LAMOTRIGINE 100 MILLIGRAM(S): 25 TABLET, ORALLY DISINTEGRATING ORAL at 12:22

## 2021-07-28 RX ADMIN — Medication 650 MILLIGRAM(S): at 17:25

## 2021-07-28 RX ADMIN — Medication 0.1 MILLIGRAM(S): at 03:35

## 2021-07-28 RX ADMIN — Medication 0.1 MILLIGRAM(S): at 21:05

## 2021-07-28 RX ADMIN — Medication 150 MILLIGRAM(S): at 12:22

## 2021-07-28 RX ADMIN — Medication 145 MILLIGRAM(S): at 12:22

## 2021-07-28 RX ADMIN — Medication 100 MILLIGRAM(S): at 17:52

## 2021-07-28 RX ADMIN — Medication 300 MILLIGRAM(S): at 13:06

## 2021-07-28 RX ADMIN — Medication 1 TABLET(S): at 12:23

## 2021-07-28 RX ADMIN — AMLODIPINE BESYLATE 10 MILLIGRAM(S): 2.5 TABLET ORAL at 06:57

## 2021-07-28 RX ADMIN — Medication 500 MILLIGRAM(S): at 13:06

## 2021-07-28 RX ADMIN — Medication 0.1 MILLIGRAM(S): at 06:57

## 2021-07-28 RX ADMIN — ENOXAPARIN SODIUM 40 MILLIGRAM(S): 100 INJECTION SUBCUTANEOUS at 12:22

## 2021-07-28 RX ADMIN — Medication 300 MILLIGRAM(S): at 06:57

## 2021-07-28 NOTE — ED PROVIDER NOTE - CLINICAL SUMMARY MEDICAL DECISION MAKING FREE TEXT BOX
76 year old male with acute SOB p/w SOB and HTN.  Discharged from hospital yesterday with same complaint.  No chest pain, fever, or cough,  Labs, CXR, EKG, anti-hypertensives.  Evaluate for PNA vs COVID vs CHF Vs COPD.  Likely admission

## 2021-07-28 NOTE — H&P ADULT - NSHPREVIEWOFSYSTEMS_GEN_ALL_CORE
General: no fever, chills  Eyes: no vision changes  ENT: no hearing changes, nasal congestion, or sore throat  CV: no chest pain or palpitations  Pulm: no wheezing, cough, or hemoptysis; ADMITS to shortness of breath  Abd/GI: no nausea, vomiting, diarrhea, constipation, abd pain  : no dysuria, hematuria, urinary frequency  MSK: no joint pain or myalgias; admits to some cramping in his lower extremities  Neuro: no syncope, dizziness, focal weakness  Psych: no anxiety or depression  Endo: no heat or cold intolerance General: no fever, chills  Eyes: no vision changes  ENT: no hearing changes, nasal congestion, or sore throat  CV: no chest pain or palpitations  Pulm: admits to SOB, no wheezing, cough, or hemoptysis  Abd/GI: no nausea, vomiting, diarrhea, constipation, abd pain  : no dysuria, hematuria, urinary frequency  MSK: no joint pain or myalgias  Neuro: no syncope, dizziness, focal weakness

## 2021-07-28 NOTE — H&P ADULT - PROBLEM SELECTOR PLAN 1
admit to telemetry  patient's BP on arrival was 186/104, s/p IV hydralazine  will continue labetalol, hydralazine and amlodipine  his symptoms of dyspnea are likely related to uncontrolled HTN  to check 2D ECHO and cycle cardiac enzymes  cardio Dr. Monterroso following, recs appreciated - presenting with systolic bp >200, hypoxia, pulmonary edema - presenting with systolic bp >200, hypoxia, pulmonary edema  - bp 214/90 in the ed -> 142/65 s/p catapres 0.1, lasix 40mg IVP, hydralazine 10mg IVP  - continue norvasc, clonidine, cardura, hydralazine, labetalol with hold parameters  - SOB improving s/p improvement in BP and O2 support  - cardio consulted (jhon), f/u recs

## 2021-07-28 NOTE — ED ADULT NURSE NOTE - OBJECTIVE STATEMENT
Patient brought by ambulance from home as reported was having shortness of breath received with non-rebreather mask blood works done by LIVIA Pérez was seen and evaluated by MD with orders made and carried out attached to cardiac monitor.

## 2021-07-28 NOTE — ED ADULT NURSE REASSESSMENT NOTE - NS ED NURSE REASSESS COMMENT FT1
Patient was disrespectful with the nurse stating "You will never understand the culture here". Patient was asked what made him upset he said he cannot sleep since the time he was discharged yesterday. Provided with pillow, warm blanket and cup of water. Call bell within reach and thanked the nurse at this time. Refused to wear hospital gown provided with  socks and placed to patient's feet.

## 2021-07-28 NOTE — H&P ADULT - NSHPPHYSICALEXAM_GEN_ALL_CORE
T(C): 37.2 (07-19-21 @ 21:46), Max: 37.3 (07-19-21 @ 18:28)  HR: 66 (07-19-21 @ 21:46) (66 - 83)  BP: 173/84 (07-19-21 @ 21:46) (173/84 - 194/93)  RR: 18 (07-19-21 @ 21:46) (16 - 18)  SpO2: 98% (07-19-21 @ 21:46) (97% - 98%)    General: No apparent distress  Head: normocephalic, atraumatic  Eyes: EOMI, anicteric  ENT: moist mucous membranes, no pharyngeal exudates  Heart: RRR, S1, S2, +systolic murmur  Chest: CTA b/l, no rales, rhonchi, or wheezes  Abd: BS+, soft, NT, ND  Back: no CVA tenderness  Extr: no cyanosis; trace edema  Integument: warm, well perfused; +tattoos  Neuro: AA&Ox3, no focal weakness, sensation to light touch intact  Psych: normal affect Vital Signs Last 24 Hrs  T(C): 36.8 (28 Jul 2021 04:40), Max: 36.9 (28 Jul 2021 04:06)  T(F): 98.2 (28 Jul 2021 04:40), Max: 98.5 (28 Jul 2021 04:06)  HR: 61 (28 Jul 2021 04:40) (61 - 81)  BP: 160/72 (28 Jul 2021 04:40) (123/68 - 214/90)  BP(mean): --  RR: 20 (28 Jul 2021 04:40) (18 - 24)  SpO2: 97% (28 Jul 2021 04:40) (87% - 100%)    General: Well developed, well nourished, NAD  HEENT: NCAT, PERRLA, EOMI bl, moist mucous membranes   Neck: Supple, nontender, no mass  Neurology: A&Ox3, nonfocal  Respiratory: CTA B/L, No W/R/R  CV: RRR, +S1/S2, +systolic murmur, no rubs or gallops  Abdominal: Soft, NT, ND +BSx4  Extremities: No C/C/E, + peripheral pulses  MSK: Normal ROM, no joint erythema or warmth, no joint swelling   Skin: warm, dry, normal color Vital Signs Last 24 Hrs  T(C): 36.8 (28 Jul 2021 04:40), Max: 36.9 (28 Jul 2021 04:06)  T(F): 98.2 (28 Jul 2021 04:40), Max: 98.5 (28 Jul 2021 04:06)  HR: 61 (28 Jul 2021 04:40) (61 - 81)  BP: 160/72 (28 Jul 2021 04:40) (123/68 - 214/90)  BP(mean): --  RR: 20 (28 Jul 2021 04:40) (18 - 24)  SpO2: 97% (28 Jul 2021 04:40) (87% - 100%)    General: Well developed, well nourished, NAD  HEENT: NCAT, PERRLA, EOMI bl, moist mucous membranes   Neck: Supple, nontender, no mass  Neurology: A&Ox3, nonfocal  Respiratory: decreased breath sounds, No W/R/R  CV: RRR, +S1/S2, +systolic murmur, no rubs or gallops  Abdominal: Soft, NT, ND +BSx4  Extremities: No C/C/E, + peripheral pulses  MSK: no joint erythema or warmth  Skin: warm, dry, normal color

## 2021-07-28 NOTE — H&P ADULT - PROBLEM SELECTOR PLAN 6
Type 2 DM not on longterm insulin  hold home metformin while inpatient  continue low dose insulin corrective scale  monitor blood glucose, hypoglycemia protocol  check A1C in AM Type 2 DM not on insulin  hold home metformin while inpatient  continue low dose insulin corrective scale  monitor blood glucose, hypoglycemia protocol  A1C 6.1 on 7/20/21

## 2021-07-28 NOTE — H&P ADULT - PROBLEM SELECTOR PLAN 2
suspect likely demand in setting or prolonged HTN  will trend CE's to peak and follow up echocardiogram  start aspirin and statin -Trop .028, no cardiac complaints at this time, but will order 2nd set  -EKG appears to be SR but difficult to assess 2/2 artifact  -Repeat ekg pending -Trop .028, no cardiac complaints at this time, but will order 2nd set  -EKG appears to be SR but difficult to assess 2/2 artifact  -Repeat ekg shows SR

## 2021-07-28 NOTE — PROVIDER CONTACT NOTE (OTHER) - ACTION/TREATMENT ORDERED:
Dr Alejandro made aware T/O given to place patient on oxygen 2LPM via nasal canal, will continue to closely monitor.

## 2021-07-28 NOTE — PROGRESS NOTE ADULT - ASSESSMENT
76 male with a history of HTN, CAD, CHF, HLD, DM. PVD, SLE, Hepatitis C, depression and CKD now with admitted for accelerated HTN and possible ACS. S/P IV Contrast dye.     Renal indices stable. BP better controlled. Caution with use of clonidine and beta blocker with bradycardia. To continue current meds. Monitor BP trend. Titrate BP meds as needed. Salt restriction. Avoid nephrotoxic meds as possible.

## 2021-07-28 NOTE — CONSULT NOTE ADULT - ASSESSMENT
77 y/o M with PMHx of Type 2 DM (not on insulin), CAD s/p stents >4 years ago (off plavix), Lupus, HTN, HLD, CKD stage 3, HepC, diverticulosis, history of blood clots in brain (s/p surgery 2013) presented to the ED complaining of shortness of breath admitted for hypertensive urgency    Recommendations:   - Patient presented with shortness of breath and hypoxia likely due to uncontrolled blood pressure SBP >200 upon admission   - TTE in 7/2021 EF 60%, mild MR, mild AS, trace TR, LA enlargement, normal RV, LV diastolic dysfunction  - Blood pressure remains elevated, SBP ranges in 140's-180's with HRs in 60s, latest BP per flowsheets 180/75, HR 62  - Would continue norvasc 10 mg qd, clonidine 0.1 TID, doxazosin 4 mg qhs, hydralazine 100 q8 and labetalol 300 TID   - Unable to increase BB due to bradycardia  - DASH/TLC diet   - Elevated troponin in setting of hypertensive urgency, trend to peak   - Continue ASA and fenofibrate   - Monitor and replete lytes, keep K>4, Mg>2.  - Strict I/Os, daily weights.  - Other cardiovascular workup will depend on clinical course.  - All other workup per primary team.  - Will continue to follow.             75 y/o M with PMHx of Type 2 DM (not on insulin), CAD s/p stents >4 years ago (off plavix), Lupus, HTN, HLD, CKD stage 3, HepC, diverticulosis, history of blood clots in brain (s/p surgery 2013) presented to the ED complaining of shortness of breath admitted for hypertensive urgency    Recommendations:   - Patient presented with shortness of breath and hypoxia likely due to uncontrolled blood pressure SBP >200 upon admission   - TTE in 7/2021 EF 60%, mild MR, mild AS, trace TR, LA enlargement, normal RV, LV diastolic dysfunction  - Blood pressure remains elevated, SBP ranges in 140's-180's with HRs in 60s, latest /65  - Would continue norvasc 10 mg qd, clonidine 0.1 TID, doxazosin 4 mg qhs, hydralazine 100 q8 and labetalol 300 TID   - Unable to increase BB due to bradycardia  - DASH/TLC diet   - Elevated troponin in setting of hypertensive urgency, trend to peak   - Continue ASA and fenofibrate   - Monitor and replete lytes, keep K>4, Mg>2.  - Strict I/Os, daily weights.  - Other cardiovascular workup will depend on clinical course.  - All other workup per primary team.  - Will continue to follow.             75 y/o M with PMHx of Type 2 DM (not on insulin), CAD s/p stents >4 years ago (off plavix), Lupus, HTN, HLD, CKD stage 3, HepC, diverticulosis, history of blood clots in brain (s/p surgery 2013) presented to the ED complaining of shortness of breath admitted for hypertensive urgency    Recommendations:   - Patient presented with shortness of breath and hypoxia likely due to uncontrolled blood pressure SBP >200 upon admission   - TTE in 7/2021 EF 60%, mild MR, mild AS, trace TR, LA enlargement, normal RV, LV diastolic dysfunction  - Blood pressure remains elevated, SBP ranges in 140's-180's with HRs in 60s, latest /73  - Increase hydralazine 100 mg to q6  - Would continue norvasc 10 mg qd, clonidine 0.1 TID, doxazosin 4 mg qhs, labetalol 300 TID   - Unable to increase BB due to bradycardia  - DASH/TLC diet   - Elevated troponin in setting of hypertensive urgency, trend to peak   - Continue ASA and fenofibrate   - Monitor and replete lytes, keep K>4, Mg>2.  - Strict I/Os, daily weights.  - Other cardiovascular workup will depend on clinical course.  - All other workup per primary team.  - Will continue to follow.

## 2021-07-28 NOTE — ED PROVIDER NOTE - OBJECTIVE STATEMENT
76 year old male with a history of HLD, DM, CAD, HTN presents with shortness of breath.  Patient BIBA, resides in a group home.  He was admitted to [facility] 7/19-7/27, admitted for SOB and HTN urgency.  Work up including CT chest and TTE were unremarkable and patient states he felt well upon discharge yesterday,  Once arriving back at the group home, he started to have difficulty breathing again.  He is not on home O2 and does not use a CPAP.  Denies chest pain, fever, cough.  O2 sat 87% on RA in ED.

## 2021-07-28 NOTE — PROGRESS NOTE ADULT - ASSESSMENT
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

## 2021-07-28 NOTE — PROGRESS NOTE ADULT - SUBJECTIVE AND OBJECTIVE BOX
Patient is a 76y old  Male who presents with a chief complaint of dyspnea, uncontrolled HTN, elevated troponin (26 Jul 2021 12:43)  Patient seen in follow up for CKD.     Events noted. Pt discharged and then readmitted. Chart reviewed.        PAST MEDICAL HISTORY:  Hypertension    Diabetes mellitus    Lupus    Hepatitis C    Anxiety and depression    CAD (coronary artery disease)    Diverticulosis    Hyperlipidemia      MEDICATIONS  (STANDING):  amLODIPine   Tablet 10 milliGRAM(s) Oral daily  ARIPiprazole 30 milliGRAM(s) Oral daily  aspirin enteric coated 81 milliGRAM(s) Oral daily  ciprofloxacin     Tablet 500 milliGRAM(s) Oral every 12 hours  cloNIDine 0.1 milliGRAM(s) Oral three times a day  doxazosin 4 milliGRAM(s) Oral at bedtime  enoxaparin Injectable 40 milliGRAM(s) SubCutaneous daily  fenofibrate Tablet 145 milliGRAM(s) Oral daily  hydrALAZINE 100 milliGRAM(s) Oral every 6 hours  labetalol 300 milliGRAM(s) Oral three times a day  lactobacillus acidophilus 1 Tablet(s) Oral daily  lamoTRIgine 100 milliGRAM(s) Oral daily  metroNIDAZOLE    Tablet 500 milliGRAM(s) Oral every 8 hours  mirtazapine 30 milliGRAM(s) Oral at bedtime  oxybutynin 5 milliGRAM(s) Oral every 8 hours  pantoprazole    Tablet 40 milliGRAM(s) Oral before breakfast  venlafaxine XR. 150 milliGRAM(s) Oral daily    MEDICATIONS  (PRN):  acetaminophen   Tablet .. 650 milliGRAM(s) Oral every 6 hours PRN Temp greater or equal to 38C (100.4F), Mild Pain (1 - 3)    T(C): 36.9 (07-28-21 @ 21:04), Max: 36.9 (07-28-21 @ 04:06)  HR: 56 (07-28-21 @ 21:04) (56 - 81)  BP: 118/65 (07-28-21 @ 21:04) (118/65 - 214/90)  RR: 17 (07-28-21 @ 21:04)  SpO2: 93% (07-28-21 @ 21:04)  Wt(kg): --  I&O's Detail        LABORATORY:                        8.2    7.65  )-----------( 218      ( 28 Jul 2021 09:50 )             26.5     07-28    140  |  106  |  39<H>  ----------------------------<  171<H>  3.8   |  25  |  2.30<H>    Ca    8.1<L>      28 Jul 2021 09:49    TPro  6.2  /  Alb  3.1<L>  /  TBili  0.3  /  DBili  x   /  AST  19  /  ALT  28  /  AlkPhos  31<L>  07-28    Sodium, Serum: 140 mmol/L (07-28 @ 09:49)  Sodium, Serum: 140 mmol/L (07-28 @ 03:40)  Sodium, Serum: 141 mmol/L (07-27 @ 07:18)    Potassium, Serum: 3.8 mmol/L (07-28 @ 09:49)  Potassium, Serum: 4.0 mmol/L (07-28 @ 03:40)  Potassium, Serum: 3.9 mmol/L (07-27 @ 07:18)    Hemoglobin: 8.2 g/dL (07-28 @ 09:50)  Hemoglobin: 9.0 g/dL (07-28 @ 03:40)  Hemoglobin: 8.8 g/dL (07-27 @ 07:18)  Hemoglobin: 8.7 g/dL (07-26 @ 07:46)    Creatinine, Serum 2.30 (07-28 @ 09:49)  Creatinine, Serum 2.10 (07-28 @ 03:40)  Creatinine, Serum 2.10 (07-27 @ 07:18)  Creatinine, Serum 2.30 (07-26 @ 07:46)    CARDIAC MARKERS ( 28 Jul 2021 09:49 )  .063 ng/mL / x     / 123 U/L / x     / 2.8 ng/mL  CARDIAC MARKERS ( 28 Jul 2021 03:40 )  .028 ng/mL / x     / 130 U/L / x     / 2.5 ng/mL      LIVER FUNCTIONS - ( 28 Jul 2021 09:49 )  Alb: 3.1 g/dL / Pro: 6.2 g/dL / ALK PHOS: 31 U/L / ALT: 28 U/L / AST: 19 U/L / GGT: x                 PHYSICAL EXAM:  General: No distress  Respiratory: b/l air entry  Cardiovascular: S1 S2  Gastrointestinal: soft  Extremities:  no edema

## 2021-07-28 NOTE — H&P ADULT - PROBLEM SELECTOR PLAN 9
VTE ppx: lovenox subQ VTE ppx: lovenox subQ    10. Diverticulitis  -CT AP on prior admission showed mild uncomplicated diverticulitis and mild hepatomegaly  -pt was dc'ed on cipro/flagyl until 7/30/21  -will continue at this time

## 2021-07-28 NOTE — ED ADULT NURSE REASSESSMENT NOTE - NS ED NURSE REASSESS COMMENT FT1
Patient stated he wants a nasal cannula instead of non-rebreather and was replaced with nasal cannula.

## 2021-07-28 NOTE — H&P ADULT - PROBLEM SELECTOR PLAN 5
stable, was admitted in the past for GI bleed  no evidence of bleeding at present  monitor H/H, check iron studies, B12 and folate  will continue with lovenox for now stable, was admitted in the past for GI bleed  no evidence of bleeding at present  monitor H/H  iron studies, B12 and folate noted from prior admission  will continue with lovenox for now stable, was admitted in the past for GI bleed  no evidence of bleeding at present  monitor H/H  iron studies, B12 and folate noted from prior admission

## 2021-07-28 NOTE — H&P ADULT - NSHPOUTPATIENTPROVIDERS_GEN_ALL_CORE
PMD: Dr. Erich Man (cell# [phone])  Cardio: does not see one (to follow up with Dr. Monterroso)  Psych: PONCHO Ventura

## 2021-07-28 NOTE — PROGRESS NOTE ADULT - PROBLEM SELECTOR PLAN 5
stable, was admitted in the past for GI bleed  no evidence of bleeding at present  monitor H/H  iron studies, B12 and folate noted from prior admission

## 2021-07-28 NOTE — H&P ADULT - NSHPSOCIALHISTORY_GEN_ALL_CORE
Lives in adult group home.  Independent in ADLs.  Former smoker, 15 pack-year history, quit in 1980.  Denies current EtOH or illicit drug use. Formerly used street drugs.  Received both doses of COVID vaccine Lives in adult group home.  Independent in ADLs.  Former smoker, 15 pack-year history, quit in 1980.  Denies current EtOH or illicit drug use. Formerly used street drugs.  Received both doses of COVID vaccine (moderna)

## 2021-07-28 NOTE — H&P ADULT - PROBLEM SELECTOR PLAN 7
Cr is improved from last admission  suspect secondary to longstanding HTN +/- DM type 2  will continue home medications for BP  continue glycemic control with insulin coverage  s/p 1L NS bolus in the ED - hold off further IV fluids  monitor renal indices, avoid nephrotoxic agents Renal indices stable  suspect secondary to longstanding HTN +/- DM type 2  will continue home medications for BP  continue glycemic control with insulin coverage  monitor renal indices, avoid nephrotoxic agents

## 2021-07-28 NOTE — CONSULT NOTE ADULT - ATTENDING COMMENTS
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) presented to the ED complaining of shortness of breath admitted for hypertensive urgency    Recommendations:   - Patient presented with shortness of breath and hypoxia likely due to uncontrolled blood pressure SBP >200 upon admission   - TTE in 7/2021 EF 60%, mild MR, mild AS, trace TR, LA enlargement, normal RV, LV diastolic dysfunction  - Blood pressure remains elevated, SBP ranges in 140's-180's with HRs in 60s, latest /73  - Increase hydralazine 100 mg to q6  - Would continue norvasc 10 mg qd, clonidine 0.1 TID, doxazosin 4 mg qhs, labetalol 300 TID   - Unable to increase BB due to bradycardia  - DASH/TLC diet   - Elevated troponin in setting of hypertensive urgency, trend to peak   - Continue ASA and fenofibrate   - Monitor and replete lytes, keep K>4, Mg>2.  - Strict I/Os, daily weights.  - Other cardiovascular workup will depend on clinical course.  - All other workup per primary team.

## 2021-07-28 NOTE — H&P ADULT - ASSESSMENT
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

## 2021-07-28 NOTE — ED ADULT NURSE REASSESSMENT NOTE - NS ED NURSE REASSESS COMMENT FT1
Patient provided with meal tray at this time as per patient he is starving and has not eaten anything since yesterday.

## 2021-07-28 NOTE — ED PROVIDER NOTE - CONSTITUTIONAL, MLM
normal... Well appearing, awake, alert, oriented to person, place, time/situation and tachypneic to 24, obese

## 2021-07-28 NOTE — H&P ADULT - HISTORY OF PRESENT ILLNESS
77 y/o M with PMHx of Type 2 DM (not on insulin), CAD s/p stents >4 years ago (off plavix), Lupus, HTN, HLD, CKD 3, HepC, diverticulosis, 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 Pt was recently admitted to [facility] after presenting on july 19 2021 with similar complaint. Pt was continued on amlodipine, doxazosin, hydralazine and labetalol. during hospital course labetalol was increased and losartan was added. After medication changes pt developed acute kidney injury and the losartan was stoped by the nephrologist. Pt was then started on clonidine. ABd ultrasound was performed which revealed no renal artery stenosis. pt developed abd pain during the course and was found to have diveriticulitis. was started on ciprofloxacin. BP and renal function improved and pt was optimized for discharge. Pt was discharged on 7/27. Shortly after d/c pt developed similar symptoms of shortness of breath....      In the ED  77 y/o M with PMHx of Type 2 DM (not on insulin), CAD s/p stents >4 years ago (off plavix), Lupus, HTN, HLD, CKD 3, HepC, diverticulosis, 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 Pt was recently admitted to [facility] after presenting on july 19 2021 with similar complaint. Pt was continued on amlodipine, doxazosin, hydralazine and labetalol. during hospital course labetalol was increased and losartan was added. After medication changes pt developed acute kidney injury and the losartan was stoped by the nephrologist. Pt was then started on clonidine. ABd ultrasound was performed which revealed no renal artery stenosis. pt developed abd pain during the course and was found to have diveriticulitis. was started on ciprofloxacin. BP and renal function improved and pt was optimized for discharge. Pt was discharged on 7/27. Shortly after d/c pt developed similar symptoms of shortness of breath....    In the ED CBC, CMP, cardiac enzymes, probnp, covid pcr were obtained. significant for H&H 9.0/29.8, bun/cr 37/2.1, glucose 132, probnp 2043, , ckmb 2.5, cpk 1.9, troponin .028. covid negative. VS: 214/90, afebrile, 67 bpm, RR 24, 87% on RA. -> 100% NRB 75 y/o M with PMHx of Type 2 DM (not on insulin), CAD s/p stents >4 years ago (off plavix), Lupus, HTN, HLD, CKD 3, HepC, diverticulosis, 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 Pt was recently admitted to [facility] after presenting on july 19 2021 with similar complaint. Pt was continued on amlodipine, doxazosin, hydralazine and labetalol. during hospital course labetalol was increased and losartan was added. After medication changes pt developed acute kidney injury and the losartan was stoped by the nephrologist. Pt was then started on clonidine. ABd ultrasound was performed which revealed no renal artery stenosis. pt developed abd pain during the course and was found to have diveriticulitis. was started on ciprofloxacin. BP and renal function improved and pt was optimized for discharge. Pt was discharged on 7/27 back to his group home. States he ate dinner, watched TV and went to bed. Shortly thereafter at 9:30PM, he started having SOB and was brought back to the ED. At this time, patient is sitting in bed, in NAD, states SOB is improving since being on O2.     In the ED CBC, CMP, cardiac enzymes, probnp, covid pcr were obtained. significant for H&H 9.0/29.8, bun/cr 37/2.1, glucose 132, probnp 2043, , ckmb 2.5, cpk 1.9, troponin .028. covid negative. VS: 214/90, afebrile, 67 bpm, RR 24, 87% on RA. -> 98% on 4L NC  s/p catapres 0.1, lasix 40mg IVP, hydralazine 10mg IVP

## 2021-07-28 NOTE — H&P ADULT - PROBLEM SELECTOR PLAN 3
s/p PCI with stents  was taken off of Plavix and statin  will continue with home fenofibrate. Pt is also on vascepa at home but this is non-formulary.  will START aspirin 81mg daily and atorvastatin 40mg daily s/p PCI with stents  was taken off of Plavix and statin  will continue with home fenofibrate. Pt is also on vascepa at home but this is non-formulary.  will continue aspirin 81mg daily and atorvastatin 40mg daily s/p PCI with stents (most recently 2017)  was taken off of Plavix and statin  will continue with home fenofibrate. Pt is also on vascepa at home but this is non-formulary.  will continue aspirin 81mg daily and atorvastatin 40mg daily

## 2021-07-28 NOTE — CONSULT NOTE ADULT - SUBJECTIVE AND OBJECTIVE BOX
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 Pt was recently admitted to [facility] after presenting on july 19 2021 with similar complaint. Pt was continued on amlodipine, doxazosin, hydralazine and labetalol. during hospital course labetalol was increased and losartan was added. After medication changes pt developed acute kidney injury and the losartan was stoped by the nephrologist. Pt was then started on clonidine. ABd ultrasound was performed which revealed no renal artery stenosis. pt developed abd pain during the course and was found to have diveriticulitis. was started on ciprofloxacin. BP and renal function improved and pt was optimized for discharge. Pt was discharged on 7/27 back to his group home. States he ate dinner, watched TV and went to bed. Shortly thereafter at 9:30PM, he started having SOB and was brought back to the ED. At this time, patient is sitting in bed, in NAD, states SOB is improving since being on O2.     In the ED CBC, CMP, cardiac enzymes, probnp, covid pcr were obtained. significant for H&H 9.0/29.8, bun/cr 37/2.1, glucose 132, probnp 2043, , ckmb 2.5, cpk 1.9, troponin .028. covid negative. VS: 214/90, afebrile, 67 bpm, RR 24, 87% on RA. -> 98% on 4L NC  s/p catapres 0.1, lasix 40mg IVP, hydralazine 10mg IVP (28 Jul 2021 04:56)      PAST MEDICAL & SURGICAL HISTORY:  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  Had surgery ( April 2013 )    S/P tonsillectomy    S/P arthroscopic knee surgery  Bilateral ( 2005 )    Torsion of testicle  Had surgery at age 14    Pilonidal cyst  Had surgery ( 1969 )    S/P cataract surgery  Bilateral    H/O hernia repair              ECHO  FINDINGS:  < from: TTE Echo Complete w/o Contrast w/ Doppler (07.20.21 @ 08:06) >   EXAM:  ECHO TTE WO CON COMP W DOPP         PROCEDURE DATE:  07/20/2021        INTERPRETATION:  INDICATION: Dyspnea  Sonographer KL    Blood Pressure 199/92    Height 172.7cm     Weight 99.8kg       BSA 2.13m sq    Dimensions:  LA 4.2       Normal Values: 2.0 - 4.0 cm  Ao 4.0        Normal Values: 2.0 - 3.8 cm  SEPTUM 1.7       Normal Values: 0.6 - 1.2 cm  PWT 1.7       Normal Values: 0.6 - 1.1 cm  LVIDd 5.2         Normal Values: 3.0 - 5.6 cm  LVIDs 3.8         Normal Values: 1.8 - 4.0 cm      OBSERVATIONS:  Mitral Valve: Thickened leaflets, mild MR.  Aortic Valve/Aorta: Calcified trileaflet aortic valve with decreased opening. Peak transaortic valve gradient is 28 mmHg with a mean transaortic valve gradient of 16 mmHg. Aortic valve area is calculus be 1.8 sq cm. This consistent with mild aortic stenosis.  Tricuspid Valve: normal with trace TR.  Pulmonic Valve: Not well-visualized  Left Atrium: Enlarged  Right Atrium: Not well-visualized  Left Ventricle: Normal left ventricular systolic function, estimated LVEF of 60%.  Right Ventricle: Grossly normal size and systolic function.  Pericardium: no significant pericardial effusion.  LV diastolic dysfunction is present        IMPRESSION:  Normal left ventricular systolic function, estimated LVEF of 60%.  Grossly normal RV size and systolic function.  Left atrial enlargement  Calcified trileaflet aortic valve with mild aortic stenosis, without AI.  Mild MR  Trace TR.  No significant pericardial effusion.    --- End of Report ---              VIET GREENWOOD MD; Attending Cardiologist  This document has been electronically signed. Jul 21 2021 12:31PM    < end of copied text >      MEDICATIONS  (STANDING):  amLODIPine   Tablet 10 milliGRAM(s) Oral daily  ARIPiprazole 30 milliGRAM(s) Oral daily  aspirin enteric coated 81 milliGRAM(s) Oral daily  ciprofloxacin     Tablet 500 milliGRAM(s) Oral every 12 hours  cloNIDine 0.1 milliGRAM(s) Oral three times a day  doxazosin 4 milliGRAM(s) Oral at bedtime  enoxaparin Injectable 40 milliGRAM(s) SubCutaneous daily  fenofibrate Tablet 145 milliGRAM(s) Oral daily  hydrALAZINE 100 milliGRAM(s) Oral every 8 hours  labetalol 300 milliGRAM(s) Oral three times a day  lactobacillus acidophilus 1 Tablet(s) Oral daily  lamoTRIgine 100 milliGRAM(s) Oral daily  metroNIDAZOLE    Tablet 500 milliGRAM(s) Oral every 8 hours  mirtazapine 30 milliGRAM(s) Oral at bedtime  oxybutynin 5 milliGRAM(s) Oral every 8 hours  pantoprazole    Tablet 40 milliGRAM(s) Oral before breakfast  venlafaxine XR. 150 milliGRAM(s) Oral daily    MEDICATIONS  (PRN):      FAMILY HISTORY:  FHx: throat cancer    No family history of colorectal cancer    Comprehensive ROS per HPI    SOCIAL HISTORY:    Former smoker, 15 pack-year history, quit in 1980.  Denies current EtOH or illicit drug use. Formerly used street drugs.    Vital Signs Last 24 Hrs  T(C): 36.8 (28 Jul 2021 07:02), Max: 36.9 (28 Jul 2021 04:06)  T(F): 98.2 (28 Jul 2021 07:02), Max: 98.5 (28 Jul 2021 04:06)  HR: 62 (28 Jul 2021 07:02) (61 - 81)  BP: 180/75 (28 Jul 2021 07:02) (123/68 - 214/90)  BP(mean): --  RR: 18 (28 Jul 2021 07:02) (18 - 24)  SpO2: 95% (28 Jul 2021 07:02) (87% - 100%)    Physical Exam:  General: Well developed, well nourished, NAD  HEENT: NCAT, PERRLA, EOMI bl, moist mucous membranes   Neck: Supple, nontender, no mass  Neurology: A&Ox3, nonfocal, sensation intact   Respiratory: CTA B/L, No W/R/R  CV: RRR, +S1/S2, no murmurs, rubs or gallops  Abdominal: Soft, NT, ND +BSx4, no palpable masses  Extremities: No C/C/E, + peripheral pulses  MSK: Normal ROM, no joint erythema or warmth, no joint swelling   Heme: No obvious ecchymosis or petechiae   Skin: warm, dry, normal color      ECG:    I&O's Detail      LABS:                        9.0    8.36  )-----------( 251      ( 28 Jul 2021 03:40 )             29.8     07-28    140  |  106  |  37<H>  ----------------------------<  132<H>  4.0   |  27  |  2.10<H>    Ca    8.6      28 Jul 2021 03:40    TPro  6.9  /  Alb  3.5  /  TBili  0.4  /  DBili  x   /  AST  25  /  ALT  33  /  AlkPhos  34<L>  07-28    CARDIAC MARKERS ( 28 Jul 2021 03:40 )  .028 ng/mL / x     / 130 U/L / x     / 2.5 ng/mL          I&O's Summary    BNPSerum Pro-Brain Natriuretic Peptide: 2043 pg/mL (07-28 @ 03:40)     Patient is a 76y old  Male who presents with a chief complaint of dyspnea, uncontrolled HTN, elevated troponin (28 Jul 2021 04:56)    Charting in progress    HPI:  77 y/o M with PMHx of Type 2 DM (not on insulin), CAD s/p stents >4 years ago (off plavix), Lupus, HTN, HLD, CKD 3, HepC, diverticulosis, 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 Pt was recently admitted to [facility] after presenting on july 19 2021 with similar complaint. Pt was continued on amlodipine, doxazosin, hydralazine and labetalol. during hospital course labetalol was increased and losartan was added. After medication changes pt developed acute kidney injury and the losartan was stoped by the nephrologist. Pt was then started on clonidine. ABd ultrasound was performed which revealed no renal artery stenosis. pt developed abd pain during the course and was found to have diveriticulitis. was started on ciprofloxacin. BP and renal function improved and pt was optimized for discharge. Pt was discharged on 7/27 back to his group home. States he ate dinner, watched TV and went to bed. Shortly thereafter at 9:30PM, he started having SOB and was brought back to the ED. At this time, patient is sitting in bed, in NAD, states SOB is improving since being on O2.     In the ED CBC, CMP, cardiac enzymes, probnp, covid pcr were obtained. significant for H&H 9.0/29.8, bun/cr 37/2.1, glucose 132, probnp 2043, , ckmb 2.5, cpk 1.9, troponin .028. covid negative. VS: 214/90, afebrile, 67 bpm, RR 24, 87% on RA. -> 98% on 4L NC  s/p catapres 0.1, lasix 40mg IVP, hydralazine 10mg IVP (28 Jul 2021 04:56)      PAST MEDICAL & SURGICAL HISTORY:  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  Had surgery ( April 2013 )    S/P tonsillectomy    S/P arthroscopic knee surgery  Bilateral ( 2005 )    Torsion of testicle  Had surgery at age 14    Pilonidal cyst  Had surgery ( 1969 )    S/P cataract surgery  Bilateral    H/O hernia repair              ECHO  FINDINGS:  < from: TTE Echo Complete w/o Contrast w/ Doppler (07.20.21 @ 08:06) >   EXAM:  ECHO TTE WO CON COMP W DOPP         PROCEDURE DATE:  07/20/2021        INTERPRETATION:  INDICATION: Dyspnea  Sonographer KL    Blood Pressure 199/92    Height 172.7cm     Weight 99.8kg       BSA 2.13m sq    Dimensions:  LA 4.2       Normal Values: 2.0 - 4.0 cm  Ao 4.0        Normal Values: 2.0 - 3.8 cm  SEPTUM 1.7       Normal Values: 0.6 - 1.2 cm  PWT 1.7       Normal Values: 0.6 - 1.1 cm  LVIDd 5.2         Normal Values: 3.0 - 5.6 cm  LVIDs 3.8         Normal Values: 1.8 - 4.0 cm      OBSERVATIONS:  Mitral Valve: Thickened leaflets, mild MR.  Aortic Valve/Aorta: Calcified trileaflet aortic valve with decreased opening. Peak transaortic valve gradient is 28 mmHg with a mean transaortic valve gradient of 16 mmHg. Aortic valve area is calculus be 1.8 sq cm. This consistent with mild aortic stenosis.  Tricuspid Valve: normal with trace TR.  Pulmonic Valve: Not well-visualized  Left Atrium: Enlarged  Right Atrium: Not well-visualized  Left Ventricle: Normal left ventricular systolic function, estimated LVEF of 60%.  Right Ventricle: Grossly normal size and systolic function.  Pericardium: no significant pericardial effusion.  LV diastolic dysfunction is present        IMPRESSION:  Normal left ventricular systolic function, estimated LVEF of 60%.  Grossly normal RV size and systolic function.  Left atrial enlargement  Calcified trileaflet aortic valve with mild aortic stenosis, without AI.  Mild MR  Trace TR.  No significant pericardial effusion.    --- End of Report ---              VIET GREENWOOD MD; Attending Cardiologist  This document has been electronically signed. Jul 21 2021 12:31PM    < end of copied text >      MEDICATIONS  (STANDING):  amLODIPine   Tablet 10 milliGRAM(s) Oral daily  ARIPiprazole 30 milliGRAM(s) Oral daily  aspirin enteric coated 81 milliGRAM(s) Oral daily  ciprofloxacin     Tablet 500 milliGRAM(s) Oral every 12 hours  cloNIDine 0.1 milliGRAM(s) Oral three times a day  doxazosin 4 milliGRAM(s) Oral at bedtime  enoxaparin Injectable 40 milliGRAM(s) SubCutaneous daily  fenofibrate Tablet 145 milliGRAM(s) Oral daily  hydrALAZINE 100 milliGRAM(s) Oral every 8 hours  labetalol 300 milliGRAM(s) Oral three times a day  lactobacillus acidophilus 1 Tablet(s) Oral daily  lamoTRIgine 100 milliGRAM(s) Oral daily  metroNIDAZOLE    Tablet 500 milliGRAM(s) Oral every 8 hours  mirtazapine 30 milliGRAM(s) Oral at bedtime  oxybutynin 5 milliGRAM(s) Oral every 8 hours  pantoprazole    Tablet 40 milliGRAM(s) Oral before breakfast  venlafaxine XR. 150 milliGRAM(s) Oral daily    MEDICATIONS  (PRN):      FAMILY HISTORY:  FHx: throat cancer    No family history of colorectal cancer    Comprehensive ROS per HPI    SOCIAL HISTORY:    Former smoker, 15 pack-year history, quit in 1980.  Denies current EtOH or illicit drug use. Formerly used street drugs.    Vital Signs Last 24 Hrs  T(C): 36.8 (28 Jul 2021 07:02), Max: 36.9 (28 Jul 2021 04:06)  T(F): 98.2 (28 Jul 2021 07:02), Max: 98.5 (28 Jul 2021 04:06)  HR: 62 (28 Jul 2021 07:02) (61 - 81)  BP: 180/75 (28 Jul 2021 07:02) (123/68 - 214/90)  BP(mean): --  RR: 18 (28 Jul 2021 07:02) (18 - 24)  SpO2: 95% (28 Jul 2021 07:02) (87% - 100%)    Physical Exam:  General: Well developed, well nourished, NAD  HEENT: NCAT, EOMI bl  Neck: Supple, nontender, no mass  Neurology: A&Ox3, nonfocal, sensation intact   Respiratory: CTA B/L, No W/R/R  CV: bradycardic, +S1/S2, no murmurs, rubs or gallops  Extremities: trace b/l le edema, + peripheral pulses    ECG:  Sinus bradycardia @ 59 bpm with 1st degree heart block, mod voltage criteria for LVH, nonspecific T wave abnormality, prolonged QT    I&O's Detail      LABS:                        9.0    8.36  )-----------( 251      ( 28 Jul 2021 03:40 )             29.8     07-28    140  |  106  |  37<H>  ----------------------------<  132<H>  4.0   |  27  |  2.10<H>    Ca    8.6      28 Jul 2021 03:40    TPro  6.9  /  Alb  3.5  /  TBili  0.4  /  DBili  x   /  AST  25  /  ALT  33  /  AlkPhos  34<L>  07-28    CARDIAC MARKERS ( 28 Jul 2021 03:40 )  .028 ng/mL / x     / 130 U/L / x     / 2.5 ng/mL          I&O's Summary    BNPSerum Pro-Brain Natriuretic Peptide: 2043 pg/mL (07-28 @ 03:40)     Patient is a 76y old  Male who presents with a chief complaint of dyspnea, uncontrolled HTN, elevated troponin (28 Jul 2021 04:56)    HPI:  77 y/o M with PMHx of Type 2 DM (not on insulin), CAD s/p stents >4 years ago (off plavix), Lupus, HTN, HLD, CKD 3, HepC, diverticulosis, 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 Pt was recently admitted to [facility] after presenting on july 19 2021 with similar complaint. Pt was continued on amlodipine, doxazosin, hydralazine and labetalol. during hospital course labetalol was increased and losartan was added. After medication changes pt developed acute kidney injury and the losartan was stoped by the nephrologist. Pt was then started on clonidine. ABd ultrasound was performed which revealed no renal artery stenosis. pt developed abd pain during the course and was found to have diveriticulitis. was started on ciprofloxacin. BP and renal function improved and pt was optimized for discharge. Pt was discharged on 7/27 back to his group home. States he ate dinner, watched TV and went to bed. Shortly thereafter at 9:30PM, he started having SOB and was brought back to the ED. At this time, patient is sitting in bed, in NAD, states SOB is improving since being on O2.     In the ED CBC, CMP, cardiac enzymes, probnp, covid pcr were obtained. significant for H&H 9.0/29.8, bun/cr 37/2.1, glucose 132, probnp 2043, , ckmb 2.5, cpk 1.9, troponin .028. covid negative. VS: 214/90, afebrile, 67 bpm, RR 24, 87% on RA. -> 98% on 4L NC  s/p catapres 0.1, lasix 40mg IVP, hydralazine 10mg IVP (28 Jul 2021 04:56)      PAST MEDICAL & SURGICAL HISTORY:  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  Had surgery ( April 2013 )    S/P tonsillectomy    S/P arthroscopic knee surgery  Bilateral ( 2005 )    Torsion of testicle  Had surgery at age 14    Pilonidal cyst  Had surgery ( 1969 )    S/P cataract surgery  Bilateral    H/O hernia repair              ECHO  FINDINGS:  < from: TTE Echo Complete w/o Contrast w/ Doppler (07.20.21 @ 08:06) >   EXAM:  ECHO TTE WO CON COMP W DOPP         PROCEDURE DATE:  07/20/2021        INTERPRETATION:  INDICATION: Dyspnea  Sonographer KL    Blood Pressure 199/92    Height 172.7cm     Weight 99.8kg       BSA 2.13m sq    Dimensions:  LA 4.2       Normal Values: 2.0 - 4.0 cm  Ao 4.0        Normal Values: 2.0 - 3.8 cm  SEPTUM 1.7       Normal Values: 0.6 - 1.2 cm  PWT 1.7       Normal Values: 0.6 - 1.1 cm  LVIDd 5.2         Normal Values: 3.0 - 5.6 cm  LVIDs 3.8         Normal Values: 1.8 - 4.0 cm      OBSERVATIONS:  Mitral Valve: Thickened leaflets, mild MR.  Aortic Valve/Aorta: Calcified trileaflet aortic valve with decreased opening. Peak transaortic valve gradient is 28 mmHg with a mean transaortic valve gradient of 16 mmHg. Aortic valve area is calculus be 1.8 sq cm. This consistent with mild aortic stenosis.  Tricuspid Valve: normal with trace TR.  Pulmonic Valve: Not well-visualized  Left Atrium: Enlarged  Right Atrium: Not well-visualized  Left Ventricle: Normal left ventricular systolic function, estimated LVEF of 60%.  Right Ventricle: Grossly normal size and systolic function.  Pericardium: no significant pericardial effusion.  LV diastolic dysfunction is present        IMPRESSION:  Normal left ventricular systolic function, estimated LVEF of 60%.  Grossly normal RV size and systolic function.  Left atrial enlargement  Calcified trileaflet aortic valve with mild aortic stenosis, without AI.  Mild MR  Trace TR.  No significant pericardial effusion.    --- End of Report ---              VIET GREENWOOD MD; Attending Cardiologist  This document has been electronically signed. Jul 21 2021 12:31PM    < end of copied text >      MEDICATIONS  (STANDING):  amLODIPine   Tablet 10 milliGRAM(s) Oral daily  ARIPiprazole 30 milliGRAM(s) Oral daily  aspirin enteric coated 81 milliGRAM(s) Oral daily  ciprofloxacin     Tablet 500 milliGRAM(s) Oral every 12 hours  cloNIDine 0.1 milliGRAM(s) Oral three times a day  doxazosin 4 milliGRAM(s) Oral at bedtime  enoxaparin Injectable 40 milliGRAM(s) SubCutaneous daily  fenofibrate Tablet 145 milliGRAM(s) Oral daily  hydrALAZINE 100 milliGRAM(s) Oral every 8 hours  labetalol 300 milliGRAM(s) Oral three times a day  lactobacillus acidophilus 1 Tablet(s) Oral daily  lamoTRIgine 100 milliGRAM(s) Oral daily  metroNIDAZOLE    Tablet 500 milliGRAM(s) Oral every 8 hours  mirtazapine 30 milliGRAM(s) Oral at bedtime  oxybutynin 5 milliGRAM(s) Oral every 8 hours  pantoprazole    Tablet 40 milliGRAM(s) Oral before breakfast  venlafaxine XR. 150 milliGRAM(s) Oral daily    MEDICATIONS  (PRN):      FAMILY HISTORY:  FHx: throat cancer    No family history of colorectal cancer    Comprehensive ROS per HPI    SOCIAL HISTORY:    Former smoker, 15 pack-year history, quit in 1980.  Denies current EtOH or illicit drug use. Formerly used street drugs.    Vital Signs Last 24 Hrs  T(C): 36.8 (28 Jul 2021 07:02), Max: 36.9 (28 Jul 2021 04:06)  T(F): 98.2 (28 Jul 2021 07:02), Max: 98.5 (28 Jul 2021 04:06)  HR: 62 (28 Jul 2021 07:02) (61 - 81)  BP: 180/75 (28 Jul 2021 07:02) (123/68 - 214/90)  BP(mean): --  RR: 18 (28 Jul 2021 07:02) (18 - 24)  SpO2: 95% (28 Jul 2021 07:02) (87% - 100%)    Physical Exam:  General: Well developed, well nourished, NAD  HEENT: NCAT, EOMI bl  Neck: Supple, nontender, no mass  Neurology: A&Ox3, nonfocal, sensation intact   Respiratory: CTA B/L, No W/R/R  CV: bradycardic, +S1/S2, no murmurs, rubs or gallops  Extremities: trace b/l le edema, + peripheral pulses    ECG:  Sinus bradycardia @ 59 bpm with 1st degree heart block, mod voltage criteria for LVH, nonspecific T wave abnormality, prolonged QT    I&O's Detail      LABS:                        9.0    8.36  )-----------( 251      ( 28 Jul 2021 03:40 )             29.8     07-28    140  |  106  |  37<H>  ----------------------------<  132<H>  4.0   |  27  |  2.10<H>    Ca    8.6      28 Jul 2021 03:40    TPro  6.9  /  Alb  3.5  /  TBili  0.4  /  DBili  x   /  AST  25  /  ALT  33  /  AlkPhos  34<L>  07-28    CARDIAC MARKERS ( 28 Jul 2021 03:40 )  .028 ng/mL / x     / 130 U/L / x     / 2.5 ng/mL          I&O's Summary    BNPSerum Pro-Brain Natriuretic Peptide: 2043 pg/mL (07-28 @ 03:40)

## 2021-07-28 NOTE — PROGRESS NOTE ADULT - SUBJECTIVE AND OBJECTIVE BOX
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 Pt was recently admitted to [facility] after presenting on july 19 2021 with similar complaint. Pt was continued on amlodipine, doxazosin, hydralazine and labetalol. during hospital course labetalol was increased and losartan was added. After medication changes pt developed acute kidney injury and the losartan was stoped by the nephrologist. Pt was then started on clonidine. ABd ultrasound was performed which revealed no renal artery stenosis. pt developed abd pain during the course and was found to have diveriticulitis. was started on ciprofloxacin. BP and renal function improved and pt was optimized for discharge. Pt was discharged on 7/27 back to his group home. States he ate dinner, watched TV and went to bed. Shortly thereafter at 9:30PM, he started having SOB and was brought back to the ED. At this time, patient is sitting in bed, in NAD, states SOB is improving since being on O2.     In the ED CBC, CMP, cardiac enzymes, probnp, covid pcr were obtained. significant for H&H 9.0/29.8, bun/cr 37/2.1, glucose 132, probnp 2043, , ckmb 2.5, cpk 1.9, troponin .028. covid negative. VS: 214/90, afebrile, 67 bpm, RR 24, 87% on RA. -> 98% on 4L NC  s/p catapres 0.1, lasix 40mg IVP, hydralazine 10mg IVP (28 Jul 2021 04:56)      SUBJECTIVE & OBJECTIVE: Pt seen and examined at bedside. abdomial pain mprovin  d    PHYSICAL EXAM:  T(C): 36.9 (07-28-21 @ 08:36), Max: 36.9 (07-28-21 @ 04:06)  HR: 64 (07-28-21 @ 08:36) (56 - 81)  BP: 183/73 (07-28-21 @ 08:36) (123/68 - 214/90)  RR: 20 (07-28-21 @ 08:36) (17 - 24)  SpO2: 96% (07-28-21 @ 08:36) (87% - 100%)  Wt(kg): -- Height (cm): 172.7 (07-28 @ 02:45)  Weight (kg): 108.9 (07-28 @ 02:45)  BMI (kg/m2): 36.5 (07-28 @ 02:45)  BSA (m2): 2.21 (07-28 @ 02:45)  GENERAL: NAD, well-groomed, well-developed  HEAD:  Atraumatic, Normocephalic  EYES: EOMI, PERRLA, conjunctiva and sclera clear  ENMT: Moist mucous membranes  NECK: Supple, No JVD  NERVOUS SYSTEM:  Alert & Oriented X3, Motor Strength 5/5 B/L upper and lower extremities; DTRs 2+ intact and symmetric  CHEST/LUNG: Clear to auscultation bilaterally; No rales, rhonchi, wheezing, or rubs  HEART: Regular rate and rhythm; No murmurs, rubs, or gallops  ABDOMEN: Soft, Nontender, Nondistended; Bowel sounds present  EXTREMITIES:  2+ Peripheral Pulses, No clubbing, cyanosis, or edema        MEDICATIONS  (STANDING):  amLODIPine   Tablet 10 milliGRAM(s) Oral daily  ARIPiprazole 30 milliGRAM(s) Oral daily  aspirin enteric coated 81 milliGRAM(s) Oral daily  ciprofloxacin     Tablet 500 milliGRAM(s) Oral every 12 hours  cloNIDine 0.1 milliGRAM(s) Oral three times a day  doxazosin 4 milliGRAM(s) Oral at bedtime  enoxaparin Injectable 40 milliGRAM(s) SubCutaneous daily  fenofibrate Tablet 145 milliGRAM(s) Oral daily  hydrALAZINE 100 milliGRAM(s) Oral every 8 hours  labetalol 300 milliGRAM(s) Oral three times a day  lactobacillus acidophilus 1 Tablet(s) Oral daily  lamoTRIgine 100 milliGRAM(s) Oral daily  metroNIDAZOLE    Tablet 500 milliGRAM(s) Oral every 8 hours  mirtazapine 30 milliGRAM(s) Oral at bedtime  oxybutynin 5 milliGRAM(s) Oral every 8 hours  pantoprazole    Tablet 40 milliGRAM(s) Oral before breakfast  venlafaxine XR. 150 milliGRAM(s) Oral daily    MEDICATIONS  (PRN):      LABS:                        8.2    7.65  )-----------( 218      ( 28 Jul 2021 09:50 )             26.5     07-28    140  |  106  |  39<H>  ----------------------------<  171<H>  3.8   |  25  |  2.30<H>    Ca    8.1<L>      28 Jul 2021 09:49    TPro  6.2  /  Alb  3.1<L>  /  TBili  0.3  /  DBili  x   /  AST  19  /  ALT  28  /  AlkPhos  31<L>  07-28          CAPILLARY BLOOD GLUCOSE      POCT Blood Glucose.: 154 mg/dL (28 Jul 2021 06:51)  POCT Blood Glucose.: 122 mg/dL (27 Jul 2021 16:36)  POCT Blood Glucose.: 127 mg/dL (27 Jul 2021 11:55)      CAPILLARY BLOOD GLUCOSE      POCT Blood Glucose.: 154 mg/dL (28 Jul 2021 06:51)  POCT Blood Glucose.: 122 mg/dL (27 Jul 2021 16:36)  POCT Blood Glucose.: 127 mg/dL (27 Jul 2021 11:55)    CAPILLARY BLOOD GLUCOSE      POCT Blood Glucose.: 154 mg/dL (28 Jul 2021 06:51)      CARDIAC MARKERS ( 28 Jul 2021 09:49 )  .063 ng/mL / x     / 123 U/L / x     / 2.8 ng/mL  CARDIAC MARKERS ( 28 Jul 2021 03:40 )  .028 ng/mL / x     / 130 U/L / x     / 2.5 ng/mL        RECENT CULTURES:      RADIOLOGY & ADDITIONAL TESTS:                        DVT/GI ppx  Discussed with pt @ bedside

## 2021-07-29 LAB
ANION GAP SERPL CALC-SCNC: 6 MMOL/L — SIGNIFICANT CHANGE UP (ref 5–17)
BUN SERPL-MCNC: 38 MG/DL — HIGH (ref 7–23)
CALCIUM SERPL-MCNC: 8.3 MG/DL — LOW (ref 8.5–10.1)
CHLORIDE SERPL-SCNC: 108 MMOL/L — SIGNIFICANT CHANGE UP (ref 96–108)
CO2 SERPL-SCNC: 28 MMOL/L — SIGNIFICANT CHANGE UP (ref 22–31)
COVID-19 SPIKE DOMAIN AB INTERP: POSITIVE
COVID-19 SPIKE DOMAIN ANTIBODY RESULT: >250 U/ML — HIGH
CREAT SERPL-MCNC: 2 MG/DL — HIGH (ref 0.5–1.3)
GLUCOSE SERPL-MCNC: 123 MG/DL — HIGH (ref 70–99)
HCT VFR BLD CALC: 27.8 % — LOW (ref 39–50)
HGB BLD-MCNC: 8.4 G/DL — LOW (ref 13–17)
MCHC RBC-ENTMCNC: 25.5 PG — LOW (ref 27–34)
MCHC RBC-ENTMCNC: 30.2 GM/DL — LOW (ref 32–36)
MCV RBC AUTO: 84.2 FL — SIGNIFICANT CHANGE UP (ref 80–100)
NRBC # BLD: 0 /100 WBCS — SIGNIFICANT CHANGE UP (ref 0–0)
NT-PROBNP SERPL-SCNC: 3118 PG/ML — HIGH (ref 0–450)
PLATELET # BLD AUTO: 253 K/UL — SIGNIFICANT CHANGE UP (ref 150–400)
POTASSIUM SERPL-MCNC: 3.7 MMOL/L — SIGNIFICANT CHANGE UP (ref 3.5–5.3)
POTASSIUM SERPL-SCNC: 3.7 MMOL/L — SIGNIFICANT CHANGE UP (ref 3.5–5.3)
RBC # BLD: 3.3 M/UL — LOW (ref 4.2–5.8)
RBC # FLD: 18.6 % — HIGH (ref 10.3–14.5)
SARS-COV-2 IGG+IGM SERPL QL IA: >250 U/ML — HIGH
SARS-COV-2 IGG+IGM SERPL QL IA: POSITIVE
SODIUM SERPL-SCNC: 142 MMOL/L — SIGNIFICANT CHANGE UP (ref 135–145)
TROPONIN I SERPL-MCNC: 0.05 NG/ML — HIGH (ref 0.01–0.04)
WBC # BLD: 7.52 K/UL — SIGNIFICANT CHANGE UP (ref 3.8–10.5)
WBC # FLD AUTO: 7.52 K/UL — SIGNIFICANT CHANGE UP (ref 3.8–10.5)

## 2021-07-29 PROCEDURE — 99232 SBSQ HOSP IP/OBS MODERATE 35: CPT | Mod: GC

## 2021-07-29 PROCEDURE — 99232 SBSQ HOSP IP/OBS MODERATE 35: CPT

## 2021-07-29 RX ADMIN — Medication 500 MILLIGRAM(S): at 13:04

## 2021-07-29 RX ADMIN — Medication 100 MILLIGRAM(S): at 05:05

## 2021-07-29 RX ADMIN — Medication 145 MILLIGRAM(S): at 13:05

## 2021-07-29 RX ADMIN — PANTOPRAZOLE SODIUM 40 MILLIGRAM(S): 20 TABLET, DELAYED RELEASE ORAL at 05:05

## 2021-07-29 RX ADMIN — Medication 5 MILLIGRAM(S): at 21:14

## 2021-07-29 RX ADMIN — Medication 0.1 MILLIGRAM(S): at 05:06

## 2021-07-29 RX ADMIN — Medication 5 MILLIGRAM(S): at 13:04

## 2021-07-29 RX ADMIN — Medication 1 TABLET(S): at 13:04

## 2021-07-29 RX ADMIN — Medication 0.1 MILLIGRAM(S): at 21:14

## 2021-07-29 RX ADMIN — AMLODIPINE BESYLATE 10 MILLIGRAM(S): 2.5 TABLET ORAL at 05:06

## 2021-07-29 RX ADMIN — MIRTAZAPINE 30 MILLIGRAM(S): 45 TABLET, ORALLY DISINTEGRATING ORAL at 21:14

## 2021-07-29 RX ADMIN — Medication 300 MILLIGRAM(S): at 05:05

## 2021-07-29 RX ADMIN — Medication 5 MILLIGRAM(S): at 05:05

## 2021-07-29 RX ADMIN — ARIPIPRAZOLE 30 MILLIGRAM(S): 15 TABLET ORAL at 13:04

## 2021-07-29 RX ADMIN — Medication 100 MILLIGRAM(S): at 00:13

## 2021-07-29 RX ADMIN — Medication 100 MILLIGRAM(S): at 17:42

## 2021-07-29 RX ADMIN — Medication 4 MILLIGRAM(S): at 21:14

## 2021-07-29 RX ADMIN — Medication 81 MILLIGRAM(S): at 13:05

## 2021-07-29 RX ADMIN — ENOXAPARIN SODIUM 40 MILLIGRAM(S): 100 INJECTION SUBCUTANEOUS at 13:05

## 2021-07-29 RX ADMIN — Medication 500 MILLIGRAM(S): at 21:14

## 2021-07-29 RX ADMIN — Medication 500 MILLIGRAM(S): at 17:42

## 2021-07-29 RX ADMIN — Medication 500 MILLIGRAM(S): at 05:05

## 2021-07-29 RX ADMIN — Medication 300 MILLIGRAM(S): at 13:04

## 2021-07-29 RX ADMIN — Medication 0.1 MILLIGRAM(S): at 13:04

## 2021-07-29 RX ADMIN — Medication 100 MILLIGRAM(S): at 23:40

## 2021-07-29 RX ADMIN — LAMOTRIGINE 100 MILLIGRAM(S): 25 TABLET, ORALLY DISINTEGRATING ORAL at 13:05

## 2021-07-29 RX ADMIN — Medication 300 MILLIGRAM(S): at 21:14

## 2021-07-29 RX ADMIN — Medication 150 MILLIGRAM(S): at 13:05

## 2021-07-29 RX ADMIN — Medication 100 MILLIGRAM(S): at 13:05

## 2021-07-29 NOTE — PROGRESS NOTE ADULT - SUBJECTIVE AND OBJECTIVE BOX
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 Pt was recently admitted to [facility] after presenting on july 19 2021 with similar complaint. Pt was continued on amlodipine, doxazosin, hydralazine and labetalol. during hospital course labetalol was increased and losartan was added. After medication changes pt developed acute kidney injury and the losartan was stoped by the nephrologist. Pt was then started on clonidine. ABd ultrasound was performed which revealed no renal artery stenosis. pt developed abd pain during the course and was found to have diveriticulitis. was started on ciprofloxacin. BP and renal function improved and pt was optimized for discharge. Pt was discharged on 7/27 back to his group home. States he ate dinner, watched TV and went to bed. Shortly thereafter at 9:30PM, he started having SOB and was brought back to the ED. At this time, patient is sitting in bed, in NAD, states SOB is improving since being on O2.     In the ED CBC, CMP, cardiac enzymes, probnp, covid pcr were obtained. significant for H&H 9.0/29.8, bun/cr 37/2.1, glucose 132, probnp 2043, , ckmb 2.5, cpk 1.9, troponin .028. covid negative. VS: 214/90, afebrile, 67 bpm, RR 24, 87% on RA. -> 98% on 4L NC  s/p catapres 0.1, lasix 40mg IVP, hydralazine 10mg IVP (28 Jul 2021 04:56)      SUBJECTIVE & OBJECTIVE: Pt seen and examined at bedside. decrease air entr athte bases     PHYSICAL EXAM:  T(C): 37 (07-29-21 @ 04:46), Max: 37 (07-29-21 @ 04:46)  HR: 62 (07-29-21 @ 04:46) (56 - 65)  BP: 157/75 (07-29-21 @ 04:46) (118/65 - 167/89)  RR: 18 (07-29-21 @ 04:46) (17 - 18)  SpO2: 94% (07-29-21 @ 04:46) (93% - 97%)  Wt(kg): --   GENERAL: NAD, well-groomed, well-developed  HEAD:  Atraumatic, Normocephalic  NERVOUS SYSTEM:  Alert  CHEST/LUNG: Clear to auscultation bilaterally; No rales, rhonchi, wheezing, or rubs  HEART: Regular rate and rhythm; No murmurs, rubs, or gallops  ABDOMEN: Soft, Nontender, Nondistended; Bowel sounds present  EXTREMITIES:  2+ Peripheral Pulses, No clubbing, cyanosis, or edema        MEDICATIONS  (STANDING):  amLODIPine   Tablet 10 milliGRAM(s) Oral daily  ARIPiprazole 30 milliGRAM(s) Oral daily  aspirin enteric coated 81 milliGRAM(s) Oral daily  ciprofloxacin     Tablet 500 milliGRAM(s) Oral every 12 hours  cloNIDine 0.1 milliGRAM(s) Oral three times a day  doxazosin 4 milliGRAM(s) Oral at bedtime  enoxaparin Injectable 40 milliGRAM(s) SubCutaneous daily  fenofibrate Tablet 145 milliGRAM(s) Oral daily  hydrALAZINE 100 milliGRAM(s) Oral every 6 hours  labetalol 300 milliGRAM(s) Oral three times a day  lactobacillus acidophilus 1 Tablet(s) Oral daily  lamoTRIgine 100 milliGRAM(s) Oral daily  metroNIDAZOLE    Tablet 500 milliGRAM(s) Oral every 8 hours  mirtazapine 30 milliGRAM(s) Oral at bedtime  oxybutynin 5 milliGRAM(s) Oral every 8 hours  pantoprazole    Tablet 40 milliGRAM(s) Oral before breakfast  venlafaxine XR. 150 milliGRAM(s) Oral daily    MEDICATIONS  (PRN):  acetaminophen   Tablet .. 650 milliGRAM(s) Oral every 6 hours PRN Temp greater or equal to 38C (100.4F), Mild Pain (1 - 3)      LABS:                        8.4    7.52  )-----------( 253      ( 29 Jul 2021 06:39 )             27.8     07-29    142  |  108  |  38<H>  ----------------------------<  123<H>  3.7   |  28  |  2.00<H>    Ca    8.3<L>      29 Jul 2021 06:39    TPro  6.2  /  Alb  3.1<L>  /  TBili  0.3  /  DBili  x   /  AST  19  /  ALT  28  /  AlkPhos  31<L>  07-28          CAPILLARY BLOOD GLUCOSE      POCT Blood Glucose.: 108 mg/dL (29 Jul 2021 08:10)      CAPILLARY BLOOD GLUCOSE      POCT Blood Glucose.: 108 mg/dL (29 Jul 2021 08:10)    CAPILLARY BLOOD GLUCOSE      POCT Blood Glucose.: 108 mg/dL (29 Jul 2021 08:10)      CARDIAC MARKERS ( 29 Jul 2021 06:39 )  .051 ng/mL / x     / x     / x     / x      CARDIAC MARKERS ( 28 Jul 2021 09:49 )  .063 ng/mL / x     / 123 U/L / x     / 2.8 ng/mL  CARDIAC MARKERS ( 28 Jul 2021 03:40 )  .028 ng/mL / x     / 130 U/L / x     / 2.5 ng/mL        RECENT CULTURES:      RADIOLOGY & ADDITIONAL TESTS:                        DVT/GI ppx  Discussed with pt @ bedside

## 2021-07-29 NOTE — PROGRESS NOTE ADULT - ASSESSMENT
77 y/o M with PMHx of Type 2 DM (not on insulin), CAD s/p stents >4 years ago (off plavix), Lupus, HTN, HLD, CKD stage 3, HepC, diverticulosis, history of blood clots in brain (s/p surgery 2013) presented to the ED complaining of shortness of breath admitted for hypertensive urgency

## 2021-07-29 NOTE — PROGRESS NOTE ADULT - PROBLEM SELECTOR PLAN 2
-Trop .028, no cardiac complaints at this time, but will order 2nd set  -EKG appears to be SR but difficult to assess 2/2 artifact  -Repeat ekg shows SR
-Trop .028, no cardiac complaints at this time, but will order 2nd set  -EKG appears to be SR but difficult to assess 2/2 artifact  -Repeat ekg shows SR

## 2021-07-29 NOTE — PROGRESS NOTE ADULT - ASSESSMENT
76 male with a history of HTN, CAD, CHF, HLD, DM. PVD, SLE, Hepatitis C, depression and CKD now with admitted for accelerated HTN and possible ACS. S/P IV Contrast dye.     Renal indices stable. BP better controlled. To continue current meds.   Monitor BP trend. Titrate BP meds as needed. Salt restriction. Avoid nephrotoxic meds as possible.   D/w patient regarding need for out patient nephrology follow up.

## 2021-07-29 NOTE — PROGRESS NOTE ADULT - PROBLEM SELECTOR PLAN 8
continue home cardura  on oxybutnin 15mg ER at home - will c/w oxybutnin 5mg TID
continue home cardura  on oxybutnin 15mg ER at home - will c/w oxybutnin 5mg TID

## 2021-07-29 NOTE — PROGRESS NOTE ADULT - SUBJECTIVE AND OBJECTIVE BOX
[facility] -- Fifi Bliss, Kalpesh Valdovinos, Abraham Sheridan Savella, Goodger  Office # [phone]    Follow Up:  SOB, Uncontrolled HTN    Subjective/Observations: Seen on side-lying position with NC at 3L/min.  Denies SOB or orthopnea at this time.  However, claims that it happens intermittently with no provocation.  Denies chest or pleuritic pain.  Denies palpitations    REVIEW OF SYSTEMS: All other review of systems is negative unless indicated above  PAST MEDICAL & SURGICAL HISTORY:  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  Had surgery ( April 2013 )    S/P tonsillectomy    S/P arthroscopic knee surgery  Bilateral ( 2005 )    Torsion of testicle  Had surgery at age 14    Pilonidal cyst  Had surgery ( 1969 )    S/P cataract surgery  Bilateral    H/O hernia repair    MEDICATIONS  (STANDING):  amLODIPine   Tablet 10 milliGRAM(s) Oral daily  ARIPiprazole 30 milliGRAM(s) Oral daily  aspirin enteric coated 81 milliGRAM(s) Oral daily  ciprofloxacin     Tablet 500 milliGRAM(s) Oral every 12 hours  cloNIDine 0.1 milliGRAM(s) Oral three times a day  doxazosin 4 milliGRAM(s) Oral at bedtime  enoxaparin Injectable 40 milliGRAM(s) SubCutaneous daily  fenofibrate Tablet 145 milliGRAM(s) Oral daily  hydrALAZINE 100 milliGRAM(s) Oral every 6 hours  labetalol 300 milliGRAM(s) Oral three times a day  lactobacillus acidophilus 1 Tablet(s) Oral daily  lamoTRIgine 100 milliGRAM(s) Oral daily  metroNIDAZOLE    Tablet 500 milliGRAM(s) Oral every 8 hours  mirtazapine 30 milliGRAM(s) Oral at bedtime  oxybutynin 5 milliGRAM(s) Oral every 8 hours  pantoprazole    Tablet 40 milliGRAM(s) Oral before breakfast  venlafaxine XR. 150 milliGRAM(s) Oral daily    MEDICATIONS  (PRN):  acetaminophen   Tablet .. 650 milliGRAM(s) Oral every 6 hours PRN Temp greater or equal to 38C (100.4F), Mild Pain (1 - 3)    Allergies    No Known Allergies    Intolerances    Vital Signs Last 24 Hrs  T(C): 37 (29 Jul 2021 04:46), Max: 37 (29 Jul 2021 04:46)  T(F): 98.6 (29 Jul 2021 04:46), Max: 98.6 (29 Jul 2021 04:46)  HR: 62 (29 Jul 2021 04:46) (56 - 65)  BP: 157/75 (29 Jul 2021 04:46) (118/65 - 167/89)  BP(mean): --  RR: 18 (29 Jul 2021 04:46) (17 - 18)  SpO2: 94% (29 Jul 2021 04:46) (93% - 97%)  I&O's Summary      PHYSICAL EXAM:  TELE: Not on tele  Constitutional: NAD, awake and alert, obese  HEENT: Moist Mucous Membranes, Anicteric  Pulmonary: Non-labored, breath sounds are clear bilaterally, No wheezing, rales or rhonchi  Cardiovascular: Regular, S1 and S2, + murmurs, no rubs, gallops or clicks  Gastrointestinal: Bowel Sounds present, soft, nontender.   Lymph: No peripheral edema. No lymphadenopathy.  Skin: No visible rashes or ulcers.  Psych:  Mood & affect appropriate  LABS: All Labs Reviewed:                        8.4    7.52  )-----------( 253      ( 29 Jul 2021 06:39 )             27.8                         8.2    7.65  )-----------( 218      ( 28 Jul 2021 09:50 )             26.5                         9.0    8.36  )-----------( 251      ( 28 Jul 2021 03:40 )             29.8     29 Jul 2021 06:39    142    |  108    |  38     ----------------------------<  123    3.7     |  28     |  2.00   28 Jul 2021 09:49    140    |  106    |  39     ----------------------------<  171    3.8     |  25     |  2.30   28 Jul 2021 03:40    140    |  106    |  37     ----------------------------<  132    4.0     |  27     |  2.10     Ca    8.3        29 Jul 2021 06:39  Ca    8.1        28 Jul 2021 09:49  Ca    8.6        28 Jul 2021 03:40    TPro  6.2    /  Alb  3.1    /  TBili  0.3    /  DBili  x      /  AST  19     /  ALT  28     /  AlkPhos  31     28 Jul 2021 09:49  TPro  6.9    /  Alb  3.5    /  TBili  0.4    /  DBili  x      /  AST  25     /  ALT  33     /  AlkPhos  34     28 Jul 2021 03:40    CARDIAC MARKERS ( 29 Jul 2021 06:39 )  .051 ng/mL / x     / x     / x     / x      CARDIAC MARKERS ( 28 Jul 2021 09:49 )  .063 ng/mL / x     / 123 U/L / x     / 2.8 ng/mL  CARDIAC MARKERS ( 28 Jul 2021 03:40 )  .028 ng/mL / x     / 130 U/L / x     / 2.5 ng/mL       EXAM:  ECHO TTE WO CON COMP W DOPP      PROCEDURE DATE:  07/20/2021      INTERPRETATION:  INDICATION: Dyspnea  Sonographer KL    Blood Pressure 199/92    Height 172.7cm     Weight 99.8kg       BSA 2.13m sq    Dimensions:  LA 4.2       Normal Values: 2.0 - 4.0 cm  Ao 4.0        Normal Values: 2.0 - 3.8 cm  SEPTUM 1.7       Normal Values: 0.6 - 1.2 cm  PWT 1.7       Normal Values: 0.6 - 1.1 cm  LVIDd 5.2         Normal Values: 3.0 - 5.6 cm  LVIDs 3.8         Normal Values: 1.8 - 4.0 cm    OBSERVATIONS:  Mitral Valve: Thickened leaflets, mild MR.  Aortic Valve/Aorta: Calcified trileaflet aortic valve with decreased opening. Peak transaortic valve gradient is 28 mmHg with a mean transaortic valve gradient of 16 mmHg. Aortic valve area is calculus be 1.8 sq cm. This consistent with mild aortic stenosis.  Tricuspid Valve: normal with trace TR.  Pulmonic Valve: Not well-visualized  Left Atrium: Enlarged  Right Atrium: Not well-visualized  Left Ventricle: Normal left ventricular systolic function, estimated LVEF of 60%.  Right Ventricle: Grossly normal size and systolic function.  Pericardium: no significant pericardial effusion.  LV diastolic dysfunction is present    IMPRESSION:  Normal left ventricular systolic function, estimated LVEF of 60%.  Grossly normal RV size and systolic function.  Left atrial enlargement  Calcified trileaflet aortic valve with mild aortic stenosis, without AI.  Mild MR  Trace TR.  No significant pericardial effusion.    --- End of Report ---      VIET GREENWOOD MD; Attending Cardiologist  This document has been electronically signed. Jul 21 2021 12:31PM     EXAM:  XR CHEST PORTABLE IMMED 1V                          PROCEDURE DATE:  07/28/2021      INTERPRETATION:  AP chest on July 28, 2021 at 3:31 AM. Patient is short of breath.    Heart is enlarged similar to July 23.    Present film shows mild infiltration in the mid lower lung fields right greater than left suggesting CHF.    IMPRESSION: There are new mild lower lung field infiltrates likely congestive right greater than left.    --- End of Report ---    TRIPP STEVENS MD; Attending Radiologist  This document has been electronically signed. Jul 28 2021  8:03AM    Ventricular Rate 59 BPM    Atrial Rate 59 BPM    P-R Interval 302 ms    QRS Duration 114 ms    Q-T Interval 466 ms    QTC Calculation(Bazett) 461 ms    R Axis -16 degrees    T Axis 16 degrees    Diagnosis Line Sinus bradycardia with 1st degree AV block  Moderate voltage criteria for LVH, may be normal variant  Nonspecific T wave abnormality  Prolonged QT  Abnormal ECG  Confirmed by Bonifacio BARRERA, Victorino ([id number]) on 7/28/2021 2:41:28 PM   [facility] -- Fifi Bliss, Kalpesh Valdovinos, Abraham Sheridan Savella, [place]  Office # [phone]    Follow Up:  SOB, Uncontrolled HTN    Subjective/Observations: Seen on side-lying position with NC at 3L/min.  Denies SOB or orthopnea at this time.  However, claims that it happens intermittently with no provocation.  Denies chest or pleuritic pain.  Denies palpitations    REVIEW OF SYSTEMS: All other review of systems is negative unless indicated above    PAST MEDICAL & SURGICAL HISTORY:  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  Had surgery ( April 2013 )    S/P tonsillectomy    S/P arthroscopic knee surgery  Bilateral ( 2005 )    Torsion of testicle  Had surgery at age 14    Pilonidal cyst  Had surgery ( 1969 )    S/P cataract surgery  Bilateral    H/O hernia repair    MEDICATIONS  (STANDING):  amLODIPine   Tablet 10 milliGRAM(s) Oral daily  ARIPiprazole 30 milliGRAM(s) Oral daily  aspirin enteric coated 81 milliGRAM(s) Oral daily  ciprofloxacin     Tablet 500 milliGRAM(s) Oral every 12 hours  cloNIDine 0.1 milliGRAM(s) Oral three times a day  doxazosin 4 milliGRAM(s) Oral at bedtime  enoxaparin Injectable 40 milliGRAM(s) SubCutaneous daily  fenofibrate Tablet 145 milliGRAM(s) Oral daily  hydrALAZINE 100 milliGRAM(s) Oral every 6 hours  labetalol 300 milliGRAM(s) Oral three times a day  lactobacillus acidophilus 1 Tablet(s) Oral daily  lamoTRIgine 100 milliGRAM(s) Oral daily  metroNIDAZOLE    Tablet 500 milliGRAM(s) Oral every 8 hours  mirtazapine 30 milliGRAM(s) Oral at bedtime  oxybutynin 5 milliGRAM(s) Oral every 8 hours  pantoprazole    Tablet 40 milliGRAM(s) Oral before breakfast  venlafaxine XR. 150 milliGRAM(s) Oral daily    MEDICATIONS  (PRN):  acetaminophen   Tablet .. 650 milliGRAM(s) Oral every 6 hours PRN Temp greater or equal to 38C (100.4F), Mild Pain (1 - 3)    Allergies    No Known Allergies    Intolerances    Vital Signs Last 24 Hrs  T(C): 37 (29 Jul 2021 04:46), Max: 37 (29 Jul 2021 04:46)  T(F): 98.6 (29 Jul 2021 04:46), Max: 98.6 (29 Jul 2021 04:46)  HR: 62 (29 Jul 2021 04:46) (56 - 65)  BP: 157/75 (29 Jul 2021 04:46) (118/65 - 167/89)  BP(mean): --  RR: 18 (29 Jul 2021 04:46) (17 - 18)  SpO2: 94% (29 Jul 2021 04:46) (93% - 97%)  I&O's Summary      PHYSICAL EXAM:  TELE: Not on tele  Constitutional: NAD, awake and alert, obese  HEENT: Moist Mucous Membranes, Anicteric  Pulmonary: Non-labored, breath sounds are clear bilaterally, No wheezing, rales or rhonchi  Cardiovascular: Regular, S1 and S2, + murmurs, no rubs, gallops or clicks  Gastrointestinal: Bowel Sounds present, soft, nontender.   Lymph: No peripheral edema. No lymphadenopathy.  Skin: No visible rashes or ulcers.  Psych:  Mood & affect appropriate  LABS: All Labs Reviewed:                        8.4    7.52  )-----------( 253      ( 29 Jul 2021 06:39 )             27.8                         8.2    7.65  )-----------( 218      ( 28 Jul 2021 09:50 )             26.5                         9.0    8.36  )-----------( 251      ( 28 Jul 2021 03:40 )             29.8     29 Jul 2021 06:39    142    |  108    |  38     ----------------------------<  123    3.7     |  28     |  2.00   28 Jul 2021 09:49    140    |  106    |  39     ----------------------------<  171    3.8     |  25     |  2.30   28 Jul 2021 03:40    140    |  106    |  37     ----------------------------<  132    4.0     |  27     |  2.10     Ca    8.3        29 Jul 2021 06:39  Ca    8.1        28 Jul 2021 09:49  Ca    8.6        28 Jul 2021 03:40    TPro  6.2    /  Alb  3.1    /  TBili  0.3    /  DBili  x      /  AST  19     /  ALT  28     /  AlkPhos  31     28 Jul 2021 09:49  TPro  6.9    /  Alb  3.5    /  TBili  0.4    /  DBili  x      /  AST  25     /  ALT  33     /  AlkPhos  34     28 Jul 2021 03:40    CARDIAC MARKERS ( 29 Jul 2021 06:39 )  .051 ng/mL / x     / x     / x     / x      CARDIAC MARKERS ( 28 Jul 2021 09:49 )  .063 ng/mL / x     / 123 U/L / x     / 2.8 ng/mL  CARDIAC MARKERS ( 28 Jul 2021 03:40 )  .028 ng/mL / x     / 130 U/L / x     / 2.5 ng/mL       EXAM:  ECHO TTE WO CON COMP W DOPP      PROCEDURE DATE:  07/20/2021      INTERPRETATION:  INDICATION: Dyspnea  Sonographer KL    Blood Pressure 199/92    Height 172.7cm     Weight 99.8kg       BSA 2.13m sq    Dimensions:  LA 4.2       Normal Values: 2.0 - 4.0 cm  Ao 4.0        Normal Values: 2.0 - 3.8 cm  SEPTUM 1.7       Normal Values: 0.6 - 1.2 cm  PWT 1.7       Normal Values: 0.6 - 1.1 cm  LVIDd 5.2         Normal Values: 3.0 - 5.6 cm  LVIDs 3.8         Normal Values: 1.8 - 4.0 cm    OBSERVATIONS:  Mitral Valve: Thickened leaflets, mild MR.  Aortic Valve/Aorta: Calcified trileaflet aortic valve with decreased opening. Peak transaortic valve gradient is 28 mmHg with a mean transaortic valve gradient of 16 mmHg. Aortic valve area is calculus be 1.8 sq cm. This consistent with mild aortic stenosis.  Tricuspid Valve: normal with trace TR.  Pulmonic Valve: Not well-visualized  Left Atrium: Enlarged  Right Atrium: Not well-visualized  Left Ventricle: Normal left ventricular systolic function, estimated LVEF of 60%.  Right Ventricle: Grossly normal size and systolic function.  Pericardium: no significant pericardial effusion.  LV diastolic dysfunction is present    IMPRESSION:  Normal left ventricular systolic function, estimated LVEF of 60%.  Grossly normal RV size and systolic function.  Left atrial enlargement  Calcified trileaflet aortic valve with mild aortic stenosis, without AI.  Mild MR  Trace TR.  No significant pericardial effusion.    --- End of Report ---      VIET GREENWOOD MD; Attending Cardiologist  This document has been electronically signed. Jul 21 2021 12:31PM     EXAM:  XR CHEST PORTABLE IMMED 1V                          PROCEDURE DATE:  07/28/2021      INTERPRETATION:  AP chest on July 28, 2021 at 3:31 AM. Patient is short of breath.    Heart is enlarged similar to July 23.    Present film shows mild infiltration in the mid lower lung fields right greater than left suggesting CHF.    IMPRESSION: There are new mild lower lung field infiltrates likely congestive right greater than left.    --- End of Report ---    TRIPP STEVENS MD; Attending Radiologist  This document has been electronically signed. Jul 28 2021  8:03AM    Ventricular Rate 59 BPM    Atrial Rate 59 BPM    P-R Interval 302 ms    QRS Duration 114 ms    Q-T Interval 466 ms    QTC Calculation(Bazett) 461 ms    R Axis -16 degrees    T Axis 16 degrees    Diagnosis Line Sinus bradycardia with 1st degree AV block  Moderate voltage criteria for LVH, may be normal variant  Nonspecific T wave abnormality  Prolonged QT  Abnormal ECG  Confirmed by Bonifacio BARRERA, Victorino ([id number]) on 7/28/2021 2:41:28 PM

## 2021-07-29 NOTE — PROGRESS NOTE ADULT - ASSESSMENT
77 y/o M with PMHx of Type 2 DM (not on insulin), CAD s/p stents >4 years ago (off plavix), Lupus, HTN, HLD, CKD stage 3, HepC, diverticulosis, history of blood clots in brain (s/p surgery 2013) presented to the ED complaining of shortness of breath admitted for hypertensive urgency    SOB/Diastolic function  - Patient presented with shortness of breath and hypoxia likely due to uncontrolled blood pressure SBP >200 upon admission as well as congestive changes on CxR with pro-BNP >3000  - He is requiring NC at 3L/min but euvolemic on exam  - TTE in 7/2021 EF 60%, mild MR, mild AS, trace TR, LA enlargement, normal RV, LV diastolic dysfunction.  No need to repeat  - Supplemental O2 as needed.  Titrate off as tolerated  - Ambulate with/without O2    Uncontrolled HTN  - Presented with extremely elevated BP (see above)  - BP now improved but labile at systolic 110-150's  - Continue Hydralazine 100 mg to q6  - Continue Norvasc 10 mg qd, Clonidine 0.1 TID, Doxazosin 4 mg qhs, and Labetalol 300 TID   - May need to investigate secondary causes if remains uncontrolled  - DASH/TLC diet     CAD s/p stents  - Elevated troponin in setting of hypertensive urgency, peaked and trended down.  CPK's remained normal.  No need to trend  - Continue ASA and fenofibrate   - Monitor and replete lytes, keep K>4, Mg>2.    CKD  - Avoid nephrotoxics  - Follow renal recs    DVT ppx  - Per Primary    - Other cardiovascular workup will depend on clinical course.  - All other workup per primary team.  - Will continue to follow.    Shilpa Kirkpatrick DNP, NP-C  Cardiology   Spectra #[phone]   77 y/o M with PMHx of Type 2 DM (not on insulin), CAD s/p stents >4 years ago (off plavix), Lupus, HTN, HLD, CKD stage 3, HepC, diverticulosis, history of blood clots in brain (s/p surgery 2013) presented to the ED complaining of shortness of breath admitted for hypertensive urgency    SOB/Diastolic function  - Patient presented with shortness of breath and hypoxia likely due to uncontrolled blood pressure SBP >200 upon admission as well as congestive changes on CxR with pro-BNP >3000  - He is requiring NC at 3L/min but euvolemic on exam  - TTE in 7/2021 EF 60%, mild MR, mild AS, trace TR, LA enlargement, normal RV, LV diastolic dysfunction.  No need to repeat  - Supplemental O2 as needed.  Titrate off as tolerated  - Ambulate with/without O2    Uncontrolled HTN  - BP now improved but labile at systolic 110-150's  - Continue Hydralazine 100 mg to q6  - Continue Norvasc 10 mg qd, Clonidine 0.1 TID, Doxazosin 4 mg qhs, and Labetalol 300 TID   - if necessary would increas Clonidine to 0.2 TID  - DASH/TLC diet     CAD s/p stents  - Elevated troponin in setting of hypertensive urgency, peaked and trended down.  CPK's remained normal.  No need to trend  - Continue ASA and fenofibrate   - Monitor and replete lytes, keep K>4, Mg>2.    CKD  - Avoid nephrotoxics  - Follow renal recs    DVT ppx  - Per Primary    - Other cardiovascular workup will depend on clinical course.  - All other workup per primary team.  - Will continue to follow.    Shilpa Kirkpatrick DNP, NP-C  Cardiology   Spectra #[phone]

## 2021-07-29 NOTE — CHART NOTE - NSCHARTNOTEFT_GEN_A_CORE
RN call for Symbicort order requested by the patient for SOB. Pt states that he uses Symbicort at home for asthma, PRN usually once a week. He c/o of SOB, but he is feeling much better than before and is aware about the CHF and pleural effusion.  Denies chest pain, palpitations, cough or wheezing sensation, headache.     Vital Signs Last 24 Hrs  T(C): 37 (29 Jul 2021 04:46), Max: 37 (29 Jul 2021 04:46)  T(F): 98.6 (29 Jul 2021 04:46), Max: 98.6 (29 Jul 2021 04:46)  HR: 62 (29 Jul 2021 04:46) (56 - 65)  BP: 157/75 (29 Jul 2021 04:46) (118/65 - 183/76)  RR: 18 (29 Jul 2021 04:46) (17 - 20)  SpO2: 94% (29 Jul 2021 04:46) (93% - 98%)    PE. General: NAD, mild SOB on decubitus observed, improved in a sitting position.   Lungs: b/l basal rhales. No wheezing.   Cadiac: S1,S2. 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     - Pt requesting Symbicort for SOB that has improved from admission. No signs of asthma exacerbation.  SOB in the setting of CHF and anemia.     - BP has improved and patient denies chest pain/palpitations.  2nd troponin with mild increase yesterday.  - am labs ordered including troponin and BNP.  - RN call as needed. RN call for Symbicort order requested by the patient for SOB. Pt states that he uses Symbicort at home for asthma, PRN usually once a week. He c/o of SOB, but he is feeling much better than before and is aware about the CHF and pleural effusion.  Denies chest pain, palpitations, cough or wheezing sensation, headache.     Vital Signs Last 24 Hrs  T(C): 37 (29 Jul 2021 04:46), Max: 37 (29 Jul 2021 04:46)  T(F): 98.6 (29 Jul 2021 04:46), Max: 98.6 (29 Jul 2021 04:46)  HR: 62 (29 Jul 2021 04:46) (56 - 65)  BP: 157/75 (29 Jul 2021 04:46) (118/65 - 183/76)  RR: 18 (29 Jul 2021 04:46) (17 - 20)  SpO2: 94% (29 Jul 2021 04:46) (93% - 98%)    PE. General: NAD, mild SOB on decubitus observed, improved in a sitting position.   Lungs: b/l basal rhales. No wheezing.   Cadiac: S1,S2. RRR.  No leg edema.     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     - Pt requesting Symbicort for SOB that has improved from admission. No signs of asthma exacerbation.  SOB in the setting of CHF and anemia.     - BP has improved and patient denies chest pain/palpitations.  2nd troponin with mild increase yesterday.  - am labs ordered including troponin and BNP.  - RN call as needed.

## 2021-07-29 NOTE — PROGRESS NOTE ADULT - PROBLEM SELECTOR PLAN 9
VTE ppx: lovenox subQ    10. Diverticulitis  -CT AP on prior admission showed mild uncomplicated diverticulitis and mild hepatomegaly  -pt was dc'ed on cipro/flagyl until 7/30/21  -will continue at this time
VTE ppx: lovenox subQ    10. Diverticulitis  -CT AP on prior admission showed mild uncomplicated diverticulitis and mild hepatomegaly  -pt was dc'ed on cipro/flagyl until 7/30/21  -will continue at this time

## 2021-07-29 NOTE — PROGRESS NOTE ADULT - SUBJECTIVE AND OBJECTIVE BOX
Patient is a 76y old  Male who presents with a chief complaint of dyspnea, uncontrolled HTN, elevated troponin (26 Jul 2021 12:43)  Patient seen in follow up for CKD.        PAST MEDICAL HISTORY:  Hypertension    Diabetes mellitus    Lupus    Hepatitis C    Anxiety and depression    CAD (coronary artery disease)    Diverticulosis    Hyperlipidemia      MEDICATIONS  (STANDING):  amLODIPine   Tablet 10 milliGRAM(s) Oral daily  ARIPiprazole 30 milliGRAM(s) Oral daily  aspirin enteric coated 81 milliGRAM(s) Oral daily  ciprofloxacin     Tablet 500 milliGRAM(s) Oral every 12 hours  cloNIDine 0.1 milliGRAM(s) Oral three times a day  doxazosin 4 milliGRAM(s) Oral at bedtime  enoxaparin Injectable 40 milliGRAM(s) SubCutaneous daily  fenofibrate Tablet 145 milliGRAM(s) Oral daily  hydrALAZINE 100 milliGRAM(s) Oral every 6 hours  labetalol 300 milliGRAM(s) Oral three times a day  lactobacillus acidophilus 1 Tablet(s) Oral daily  lamoTRIgine 100 milliGRAM(s) Oral daily  metroNIDAZOLE    Tablet 500 milliGRAM(s) Oral every 8 hours  mirtazapine 30 milliGRAM(s) Oral at bedtime  oxybutynin 5 milliGRAM(s) Oral every 8 hours  pantoprazole    Tablet 40 milliGRAM(s) Oral before breakfast  venlafaxine XR. 150 milliGRAM(s) Oral daily    MEDICATIONS  (PRN):  acetaminophen   Tablet .. 650 milliGRAM(s) Oral every 6 hours PRN Temp greater or equal to 38C (100.4F), Mild Pain (1 - 3)    T(C): 36.8 (07-29-21 @ 12:26), Max: 37 (07-29-21 @ 04:46)  HR: 64 (07-29-21 @ 12:26) (56 - 81)  BP: 153/74 (07-29-21 @ 12:26) (118/65 - 214/90)  RR: 17 (07-29-21 @ 12:26)  SpO2: 91% (07-29-21 @ 12:26)  Wt(kg): --  I&O's Detail          PHYSICAL EXAM:  General: No distress  Respiratory: b/l air entry  Cardiovascular: S1 S2  Gastrointestinal: soft  Extremities:  no edema                   LABORATORY:                        8.4    7.52  )-----------( 253      ( 29 Jul 2021 06:39 )             27.8     07-29    142  |  108  |  38<H>  ----------------------------<  123<H>  3.7   |  28  |  2.00<H>    Ca    8.3<L>      29 Jul 2021 06:39    TPro  6.2  /  Alb  3.1<L>  /  TBili  0.3  /  DBili  x   /  AST  19  /  ALT  28  /  AlkPhos  31<L>  07-28    Sodium, Serum: 142 mmol/L (07-29 @ 06:39)  Sodium, Serum: 140 mmol/L (07-28 @ 09:49)  Sodium, Serum: 140 mmol/L (07-28 @ 03:40)    Potassium, Serum: 3.7 mmol/L (07-29 @ 06:39)  Potassium, Serum: 3.8 mmol/L (07-28 @ 09:49)  Potassium, Serum: 4.0 mmol/L (07-28 @ 03:40)    Hemoglobin: 8.4 g/dL (07-29 @ 06:39)  Hemoglobin: 8.2 g/dL (07-28 @ 09:50)  Hemoglobin: 9.0 g/dL (07-28 @ 03:40)  Hemoglobin: 8.8 g/dL (07-27 @ 07:18)    Creatinine, Serum 2.00 (07-29 @ 06:39)  Creatinine, Serum 2.30 (07-28 @ 09:49)  Creatinine, Serum 2.10 (07-28 @ 03:40)  Creatinine, Serum 2.10 (07-27 @ 07:18)    CARDIAC MARKERS ( 29 Jul 2021 06:39 )  .051 ng/mL / x     / x     / x     / x      CARDIAC MARKERS ( 28 Jul 2021 09:49 )  .063 ng/mL / x     / 123 U/L / x     / 2.8 ng/mL  CARDIAC MARKERS ( 28 Jul 2021 03:40 )  .028 ng/mL / x     / 130 U/L / x     / 2.5 ng/mL      LIVER FUNCTIONS - ( 28 Jul 2021 09:49 )  Alb: 3.1 g/dL / Pro: 6.2 g/dL / ALK PHOS: 31 U/L / ALT: 28 U/L / AST: 19 U/L / GGT: x

## 2021-07-29 NOTE — PROGRESS NOTE ADULT - PROBLEM SELECTOR PLAN 7
Renal indices stable  suspect secondary to longstanding HTN +/- DM type 2  will continue home medications for BP  continue glycemic control with insulin coverage  monitor renal indices, avoid nephrotoxic agents
Renal indices stable  suspect secondary to longstanding HTN +/- DM type 2  will continue home medications for BP  continue glycemic control with insulin coverage  monitor renal indices, avoid nephrotoxic agents

## 2021-07-29 NOTE — PROGRESS NOTE ADULT - PROBLEM SELECTOR PLAN 3
s/p PCI with stents (most recently 2017)  was taken off of Plavix and statin  will continue with home fenofibrate. Pt is also on vascepa at home but this is non-formulary.  will continue aspirin 81mg daily and atorvastatin 40mg daily
s/p PCI with stents (most recently 2017)  was taken off of Plavix and statin  will continue with home fenofibrate. Pt is also on vascepa at home but this is non-formulary.  will continue aspirin 81mg daily and atorvastatin 40mg daily

## 2021-07-29 NOTE — PROGRESS NOTE ADULT - PROBLEM SELECTOR PLAN 6
Type 2 DM not on insulin  hold home metformin while inpatient  continue low dose insulin corrective scale  monitor blood glucose, hypoglycemia protocol  A1C 6.1 on 7/20/21
Type 2 DM not on insulin  hold home metformin while inpatient  continue low dose insulin corrective scale  monitor blood glucose, hypoglycemia protocol  A1C 6.1 on 7/20/21

## 2021-07-29 NOTE — PROGRESS NOTE ADULT - PROBLEM SELECTOR PLAN 1
- presenting with systolic bp >200, hypoxia, pulmonary edema  - bp 214/90 in the ed -> 142/65 s/p catapres 0.1, lasix 40mg IVP, hydralazine 10mg IVP  - continue norvasc, clonidine, cardura, hydralazine, labetalol with hold parameters  - SOB improving s/p improvement in BP and O2 support  - cardio consulted (jhon), f/u recs
- presenting with systolic bp >200, hypoxia, pulmonary edema  - bp 214/90 in the ed -> 142/65 s/p catapres 0.1, lasix 40mg IVP, hydralazine 10mg IVP  - continue norvasc, clonidine, cardura, hydralazine, labetalol with hold parameters  f/u cardio, given pt was just discharged, concern for non-complaince of mediation

## 2021-07-30 ENCOUNTER — TRANSCRIPTION ENCOUNTER (OUTPATIENT)
Age: 76
End: 2021-07-30

## 2021-07-30 VITALS
HEART RATE: 60 BPM | SYSTOLIC BLOOD PRESSURE: 164 MMHG | DIASTOLIC BLOOD PRESSURE: 75 MMHG | OXYGEN SATURATION: 94 % | RESPIRATION RATE: 18 BRPM | TEMPERATURE: 98 F

## 2021-07-30 PROCEDURE — 87635 SARS-COV-2 COVID-19 AMP PRB: CPT

## 2021-07-30 PROCEDURE — 82553 CREATINE MB FRACTION: CPT

## 2021-07-30 PROCEDURE — 99285 EMERGENCY DEPT VISIT HI MDM: CPT

## 2021-07-30 PROCEDURE — 86769 SARS-COV-2 COVID-19 ANTIBODY: CPT

## 2021-07-30 PROCEDURE — 71045 X-RAY EXAM CHEST 1 VIEW: CPT

## 2021-07-30 PROCEDURE — 84484 ASSAY OF TROPONIN QUANT: CPT

## 2021-07-30 PROCEDURE — 83880 ASSAY OF NATRIURETIC PEPTIDE: CPT

## 2021-07-30 PROCEDURE — 82550 ASSAY OF CK (CPK): CPT

## 2021-07-30 PROCEDURE — 80053 COMPREHEN METABOLIC PANEL: CPT

## 2021-07-30 PROCEDURE — 80048 BASIC METABOLIC PNL TOTAL CA: CPT

## 2021-07-30 PROCEDURE — 85027 COMPLETE CBC AUTOMATED: CPT

## 2021-07-30 PROCEDURE — 85025 COMPLETE CBC W/AUTO DIFF WBC: CPT

## 2021-07-30 PROCEDURE — 36415 COLL VENOUS BLD VENIPUNCTURE: CPT

## 2021-07-30 PROCEDURE — 99239 HOSP IP/OBS DSCHRG MGMT >30: CPT

## 2021-07-30 PROCEDURE — 99232 SBSQ HOSP IP/OBS MODERATE 35: CPT

## 2021-07-30 PROCEDURE — 93005 ELECTROCARDIOGRAM TRACING: CPT

## 2021-07-30 PROCEDURE — 96374 THER/PROPH/DIAG INJ IV PUSH: CPT

## 2021-07-30 PROCEDURE — 82962 GLUCOSE BLOOD TEST: CPT

## 2021-07-30 RX ORDER — ACETAMINOPHEN 500 MG
2 TABLET ORAL
Qty: 480 | Refills: 0
Start: 2021-07-30 | End: 2021-09-27

## 2021-07-30 RX ORDER — OXYBUTYNIN CHLORIDE 5 MG
1 TABLET ORAL
Qty: 0 | Refills: 0 | DISCHARGE
Start: 2021-07-30 | End: 2021-08-28

## 2021-07-30 RX ORDER — OMEPRAZOLE 10 MG/1
0 CAPSULE, DELAYED RELEASE ORAL
Qty: 0 | Refills: 11 | DISCHARGE

## 2021-07-30 RX ORDER — LAMOTRIGINE 25 MG/1
1 TABLET, ORALLY DISINTEGRATING ORAL
Qty: 30 | Refills: 0
Start: 2021-07-30 | End: 2021-08-28

## 2021-07-30 RX ORDER — ROSUVASTATIN CALCIUM 5 MG/1
1 TABLET ORAL
Qty: 0 | Refills: 0 | DISCHARGE

## 2021-07-30 RX ORDER — LABETALOL HCL 100 MG
1 TABLET ORAL
Qty: 60 | Refills: 0
Start: 2021-07-30 | End: 2021-08-28

## 2021-07-30 RX ORDER — HYDRALAZINE HCL 50 MG
1 TABLET ORAL
Qty: 90 | Refills: 0
Start: 2021-07-30 | End: 2021-08-28

## 2021-07-30 RX ORDER — OXYBUTYNIN CHLORIDE 5 MG
1 TABLET ORAL
Qty: 90 | Refills: 0
Start: 2021-07-30 | End: 2021-08-28

## 2021-07-30 RX ORDER — PANTOPRAZOLE SODIUM 20 MG/1
1 TABLET, DELAYED RELEASE ORAL
Qty: 0 | Refills: 0 | DISCHARGE
Start: 2021-07-30

## 2021-07-30 RX ORDER — ASPIRIN/CALCIUM CARB/MAGNESIUM 324 MG
1 TABLET ORAL
Qty: 30 | Refills: 0
Start: 2021-07-30 | End: 2021-08-28

## 2021-07-30 RX ORDER — HYDRALAZINE HCL 50 MG
1 TABLET ORAL
Qty: 0 | Refills: 0 | DISCHARGE

## 2021-07-30 RX ORDER — LACTOBACILLUS ACIDOPHILUS 100MM CELL
1 CAPSULE ORAL
Qty: 60 | Refills: 0
Start: 2021-07-30 | End: 2021-08-28

## 2021-07-30 RX ORDER — ARIPIPRAZOLE 15 MG/1
1 TABLET ORAL
Qty: 30 | Refills: 0
Start: 2021-07-30 | End: 2021-08-28

## 2021-07-30 RX ORDER — ICOSAPENT ETHYL 500 MG/1
2 CAPSULE, LIQUID FILLED ORAL
Qty: 0 | Refills: 5 | DISCHARGE

## 2021-07-30 RX ORDER — MELOXICAM 15 MG/1
1 TABLET ORAL
Qty: 0 | Refills: 0 | DISCHARGE

## 2021-07-30 RX ORDER — AMLODIPINE BESYLATE 2.5 MG/1
1 TABLET ORAL
Qty: 30 | Refills: 0
Start: 2021-07-30 | End: 2021-08-28

## 2021-07-30 RX ORDER — DOXAZOSIN MESYLATE 4 MG
1 TABLET ORAL
Qty: 60 | Refills: 0
Start: 2021-07-30 | End: 2021-08-28

## 2021-07-30 RX ORDER — CIPROFLOXACIN LACTATE 400MG/40ML
1 VIAL (ML) INTRAVENOUS
Qty: 10 | Refills: 0
Start: 2021-07-30 | End: 2021-08-03

## 2021-07-30 RX ORDER — LABETALOL HCL 100 MG
1 TABLET ORAL
Qty: 0 | Refills: 0 | DISCHARGE

## 2021-07-30 RX ORDER — FENOFIBRATE,MICRONIZED 130 MG
1 CAPSULE ORAL
Qty: 0 | Refills: 0 | DISCHARGE
Start: 2021-07-30

## 2021-07-30 RX ORDER — MIRTAZAPINE 45 MG/1
1 TABLET, ORALLY DISINTEGRATING ORAL
Qty: 30 | Refills: 0
Start: 2021-07-30 | End: 2021-08-28

## 2021-07-30 RX ORDER — METFORMIN HYDROCHLORIDE 850 MG/1
1 TABLET ORAL
Qty: 0 | Refills: 11 | DISCHARGE

## 2021-07-30 RX ORDER — ISOSORBIDE MONONITRATE 60 MG/1
30 TABLET, EXTENDED RELEASE ORAL DAILY
Refills: 0 | Status: DISCONTINUED | OUTPATIENT
Start: 2021-07-30 | End: 2021-07-30

## 2021-07-30 RX ORDER — VENLAFAXINE HCL 75 MG
1 CAPSULE, EXT RELEASE 24 HR ORAL
Qty: 30 | Refills: 0
Start: 2021-07-30 | End: 2021-08-28

## 2021-07-30 RX ADMIN — Medication 500 MILLIGRAM(S): at 05:24

## 2021-07-30 RX ADMIN — Medication 0.1 MILLIGRAM(S): at 05:24

## 2021-07-30 RX ADMIN — PANTOPRAZOLE SODIUM 40 MILLIGRAM(S): 20 TABLET, DELAYED RELEASE ORAL at 05:24

## 2021-07-30 RX ADMIN — Medication 1 TABLET(S): at 12:51

## 2021-07-30 RX ADMIN — Medication 100 MILLIGRAM(S): at 12:50

## 2021-07-30 RX ADMIN — Medication 145 MILLIGRAM(S): at 12:50

## 2021-07-30 RX ADMIN — Medication 300 MILLIGRAM(S): at 05:24

## 2021-07-30 RX ADMIN — ARIPIPRAZOLE 30 MILLIGRAM(S): 15 TABLET ORAL at 12:50

## 2021-07-30 RX ADMIN — Medication 150 MILLIGRAM(S): at 12:50

## 2021-07-30 RX ADMIN — AMLODIPINE BESYLATE 10 MILLIGRAM(S): 2.5 TABLET ORAL at 05:24

## 2021-07-30 RX ADMIN — Medication 0.1 MILLIGRAM(S): at 12:52

## 2021-07-30 RX ADMIN — Medication 5 MILLIGRAM(S): at 12:52

## 2021-07-30 RX ADMIN — Medication 81 MILLIGRAM(S): at 12:50

## 2021-07-30 RX ADMIN — LAMOTRIGINE 100 MILLIGRAM(S): 25 TABLET, ORALLY DISINTEGRATING ORAL at 12:51

## 2021-07-30 RX ADMIN — Medication 300 MILLIGRAM(S): at 12:52

## 2021-07-30 RX ADMIN — Medication 100 MILLIGRAM(S): at 05:24

## 2021-07-30 RX ADMIN — ENOXAPARIN SODIUM 40 MILLIGRAM(S): 100 INJECTION SUBCUTANEOUS at 12:50

## 2021-07-30 RX ADMIN — Medication 5 MILLIGRAM(S): at 05:24

## 2021-07-30 NOTE — PROGRESS NOTE ADULT - ASSESSMENT
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  On RA azt the moment with O2 Sat 93%  - TTE in 7/2021 EF 60%, mild MR, mild AS, trace TR, LA enlargement, normal RV, LV diastolic dysfunction.  No need to repeat  - Ambulate without O2 and document, please    Uncontrolled HTN  - BP again is elevated at systolic 140-160's.    - Continue Hydralazine 100 mg to q6  - Continue Norvasc 10 mg qd, Doxazosin 4 mg qhs, and Labetalol 300 TID   - Would increas Clonidine to 0.2 TID  - DASH/TLC diet     CAD s/p stents  - Elevated troponin in setting of hypertensive urgency, peaked and trended down.  CPK's remained normal.  No need to trend  - No clinical evidence of ACS  - Continue ASA and fenofibrate   - Monitor and replete lytes, keep K>4, Mg>2.    CKD  - Avoid nephrotoxics  - Follow renal recs    DVT ppx  - Per Primary    - Other cardiovascular workup will depend on clinical course.  - All other workup per primary team.  - Will continue to follow.    Shilpa Kirkpatrick DNP, NP-C  Cardiology   Spectra #[phone]

## 2021-07-30 NOTE — DISCHARGE NOTE PROVIDER - NSDCCPCAREPLAN_GEN_ALL_CORE_FT
PRINCIPAL DISCHARGE DIAGNOSIS  Diagnosis: Hypertensive urgency  Assessment and Plan of Treatment: secindary to likely non-complaince and adherence to medication  resend all updated medication to local pharmacy      SECONDARY DISCHARGE DIAGNOSES  Diagnosis: Hypertensive urgency  Assessment and Plan of Treatment:

## 2021-07-30 NOTE — DISCHARGE NOTE PROVIDER - HOSPITAL COURSE
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    - continue norvasc, clonidine, cardura, hydralazine, labetalol with hold parameters  bp improved 150 systolic       Elevated troponin no cardiac complaints at this time,   -EKG appears to be SR but difficult to assess 2/2 artifact  -Repeat ekg shows SR.       CAD (coronary artery disease).   s/p PCI with stents (most recently 2017)  was taken off of Plavix and statin  will continue with home fenofibrate. Pt is also on vascepa at home but this is non-formulary.  will continue aspirin 81mg daily and atorvastatin 40mg daily.       : Anxiety and depression: with insomnia  continue home lamictal, buspar, effexor, remeron and doxepin.     Anemia.   stable, was admitted in the past for GI bleed  no evidence of bleeding at present  monitor H/H  iron studies, B12 and folate noted from prior admission.       DM2 (diabetes mellitus, type 2). Type 2 DM not on insulin  hold home metformin while inpatient  continue low dose insulin corrective scale  monitor blood glucose, hypoglycemia protocol  A1C 6.1 on 7/20/21.     Stage 3 chronic kidney disease  suspect secondary to longstanding HTN +/- DM type 2  will continue home medications for BP  continue glycemic control with insulin coverage  monitor renal indices, avoid nephrotoxic agents.       BPH (benign prostatic hyperplasia).  continue home cardura  on oxybutnin 15mg ER at home - will c/w oxybutnin 5mg TID.

## 2021-07-30 NOTE — DISCHARGE NOTE NURSING/CASE MANAGEMENT/SOCIAL WORK - NSDCVIVACCINE_GEN_ALL_CORE_FT
Tdap; 24-Jul-2020 09:31; Valdez Watson (RN); [employer]; [id number] (Exp. Date: 17-Sep-2021); IntraMuscular; Deltoid Left.; 0.5 milliLiter(s); VIS (VIS Published: 09-May-2013, VIS Presented: 24-Jul-2020);

## 2021-07-30 NOTE — PROGRESS NOTE ADULT - ATTENDING COMMENTS
50
No signs of significant ischemia or volume overload. BP overall improved. cont current care. Further cardiac workup will depend on clinical course.
No signs of significant ischemia or volume overload. BP overall improved. Further cardiac workup will depend on clinical course.
40

## 2021-07-30 NOTE — DISCHARGE NOTE PROVIDER - NSDCMRMEDTOKEN_GEN_ALL_CORE_FT
acetaminophen 325 mg oral tablet: 2 tab(s) orally every 6 hours, As needed, Temp greater or equal to 38C (100.4F), Mild Pain (1 - 3)  Adult Aspirin Regimen 81 mg oral delayed release tablet: 1 tab(s) orally once a day  amLODIPine 10 mg oral tablet: 1 tab(s) orally once a day  ARIPiprazole 30 mg oral tablet: 1 tab(s) orally once a day  ciprofloxacin 500 mg oral tablet: 1 tab(s) orally every 12 hours  cloNIDine 0.1 mg oral tablet: 1 tab(s) orally 3 times a day  doxazosin 4 mg oral tablet: 1 tab(s) orally 2 times a day  fenofibrate 145 mg oral tablet: 1 tab(s) orally once a day  hydrALAZINE 50 mg oral tablet: 1 tab(s) orally every 8 hours  labetalol 100 mg oral tablet: 1 tab(s) orally 2 times a day  lactobacillus acidophilus oral capsule: 1 milligram(s) orally 2 times a day   LaMICtal 100 mg oral tablet: 1 tab(s) orally once a day  mirtazapine 30 mg oral tablet: 1 tab(s) orally once a day (at bedtime)  oxybutynin 5 mg oral tablet: 1 tab(s) orally every 8 hours  pantoprazole 40 mg oral delayed release tablet: 1 tab(s) orally once a day (before a meal)  Symbicort 160 mcg-4.5 mcg/inh inhalation aerosol: 2 puff(s) inhaled 2 times a day  venlafaxine 150 mg oral capsule, extended release: 1 cap(s) orally once a day

## 2021-07-30 NOTE — PROGRESS NOTE ADULT - REASON FOR ADMISSION
dyspnea, uncontrolled HTN, elevated troponin

## 2021-07-30 NOTE — PROGRESS NOTE ADULT - SUBJECTIVE AND OBJECTIVE BOX
Patient is a 76y old  Male who presents with a chief complaint of dyspnea, uncontrolled HTN, elevated troponin (26 Jul 2021 12:43)  Patient seen in follow up for CKD.        PAST MEDICAL HISTORY:  Hypertension    Diabetes mellitus    Lupus    Hepatitis C    Anxiety and depression    CAD (coronary artery disease)    Diverticulosis    Hyperlipidemia      MEDICATIONS  (STANDING):  amLODIPine   Tablet 10 milliGRAM(s) Oral daily  ARIPiprazole 30 milliGRAM(s) Oral daily  aspirin enteric coated 81 milliGRAM(s) Oral daily  ciprofloxacin     Tablet 500 milliGRAM(s) Oral every 12 hours  cloNIDine 0.1 milliGRAM(s) Oral three times a day  doxazosin 4 milliGRAM(s) Oral at bedtime  enoxaparin Injectable 40 milliGRAM(s) SubCutaneous daily  fenofibrate Tablet 145 milliGRAM(s) Oral daily  hydrALAZINE 100 milliGRAM(s) Oral every 6 hours  labetalol 300 milliGRAM(s) Oral three times a day  lactobacillus acidophilus 1 Tablet(s) Oral daily  lamoTRIgine 100 milliGRAM(s) Oral daily  mirtazapine 30 milliGRAM(s) Oral at bedtime  oxybutynin 5 milliGRAM(s) Oral every 8 hours  pantoprazole    Tablet 40 milliGRAM(s) Oral before breakfast  venlafaxine XR. 150 milliGRAM(s) Oral daily    MEDICATIONS  (PRN):  acetaminophen   Tablet .. 650 milliGRAM(s) Oral every 6 hours PRN Temp greater or equal to 38C (100.4F), Mild Pain (1 - 3)    T(C): 36.6 (07-30-21 @ 12:44), Max: 37 (07-29-21 @ 04:46)  HR: 60 (07-30-21 @ 12:44) (56 - 65)  BP: 164/75 (07-30-21 @ 12:44) (118/65 - 164/83)  RR: 18 (07-30-21 @ 12:44)  SpO2: 94% (07-30-21 @ 12:44)  Wt(kg): --  I&O's Detail          PHYSICAL EXAM:  General: No distress  Respiratory: b/l air entry  Cardiovascular: S1 S2  Gastrointestinal: soft  Extremities:  no edema                     LABORATORY:                        8.4    7.52  )-----------( 253      ( 29 Jul 2021 06:39 )             27.8     07-29    142  |  108  |  38<H>  ----------------------------<  123<H>  3.7   |  28  |  2.00<H>    Ca    8.3<L>      29 Jul 2021 06:39      Sodium, Serum: 142 mmol/L (07-29 @ 06:39)    Potassium, Serum: 3.7 mmol/L (07-29 @ 06:39)    Hemoglobin: 8.4 g/dL (07-29 @ 06:39)  Hemoglobin: 8.2 g/dL (07-28 @ 09:50)  Hemoglobin: 9.0 g/dL (07-28 @ 03:40)    Creatinine, Serum 2.00 (07-29 @ 06:39)  Creatinine, Serum 2.30 (07-28 @ 09:49)  Creatinine, Serum 2.10 (07-28 @ 03:40)    CARDIAC MARKERS ( 29 Jul 2021 06:39 )  .051 ng/mL / x     / x     / x     / x

## 2021-07-30 NOTE — DISCHARGE NOTE PROVIDER - CARE PROVIDER_API CALL
Jasmeet Bliss)  Cardiovascular Disease; Internal Medicine  [address] [place] [address]  Phone: [phone]  Fax: [phone]  Follow Up Time:

## 2021-07-30 NOTE — DISCHARGE NOTE NURSING/CASE MANAGEMENT/SOCIAL WORK - PATIENT PORTAL LINK FT
You can access the FollowMyHealth Patient Portal offered by [facility] by registering at the following website: [email or web address]. By joining [employer]’s FollowMyHealth portal, you will also be able to view your health information using other applications (apps) compatible with our system.

## 2021-07-30 NOTE — PROGRESS NOTE ADULT - SUBJECTIVE AND OBJECTIVE BOX
[facility] -- Fifi Bliss, Kalpesh Valdovinos, Abraham Sheridan Savella, [place]  Office # [phone]    Follow Up:  Uncontrolled Hypertension    Subjective/Observations:     REVIEW OF SYSTEMS: All other review of systems is negative unless indicated above  PAST MEDICAL & SURGICAL HISTORY:  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  Had surgery ( April 2013 )    S/P tonsillectomy    S/P arthroscopic knee surgery  Bilateral ( 2005 )    Torsion of testicle  Had surgery at age 14    Pilonidal cyst  Had surgery ( 1969 )    S/P cataract surgery  Bilateral    H/O hernia repair    MEDICATIONS  (STANDING):  amLODIPine   Tablet 10 milliGRAM(s) Oral daily  ARIPiprazole 30 milliGRAM(s) Oral daily  aspirin enteric coated 81 milliGRAM(s) Oral daily  ciprofloxacin     Tablet 500 milliGRAM(s) Oral every 12 hours  cloNIDine 0.1 milliGRAM(s) Oral three times a day  doxazosin 4 milliGRAM(s) Oral at bedtime  enoxaparin Injectable 40 milliGRAM(s) SubCutaneous daily  fenofibrate Tablet 145 milliGRAM(s) Oral daily  hydrALAZINE 100 milliGRAM(s) Oral every 6 hours  isosorbide   mononitrate ER Tablet (IMDUR) 30 milliGRAM(s) Oral daily  labetalol 300 milliGRAM(s) Oral three times a day  lactobacillus acidophilus 1 Tablet(s) Oral daily  lamoTRIgine 100 milliGRAM(s) Oral daily  mirtazapine 30 milliGRAM(s) Oral at bedtime  oxybutynin 5 milliGRAM(s) Oral every 8 hours  pantoprazole    Tablet 40 milliGRAM(s) Oral before breakfast  venlafaxine XR. 150 milliGRAM(s) Oral daily    MEDICATIONS  (PRN):  acetaminophen   Tablet .. 650 milliGRAM(s) Oral every 6 hours PRN Temp greater or equal to 38C (100.4F), Mild Pain (1 - 3)    Allergies    No Known Allergies    Intolerances    Vital Signs Last 24 Hrs  T(C): 36.6 (30 Jul 2021 05:18), Max: 36.8 (29 Jul 2021 12:26)  T(F): 97.9 (30 Jul 2021 05:18), Max: 98.3 (29 Jul 2021 12:26)  HR: 59 (30 Jul 2021 05:18) (59 - 64)  BP: 161/71 (30 Jul 2021 05:18) (142/78 - 164/83)  BP(mean): --  RR: 18 (30 Jul 2021 05:18) (17 - 18)  SpO2: 93% (30 Jul 2021 05:18) (91% - 97%)  I&O's Summary      PHYSICAL EXAM:  TELE: Not on tele  Constitutional: NAD, awake and alert, obese  HEENT: Moist Mucous Membranes, Anicteric  Pulmonary: Non-labored, breath sounds are clear bilaterally, No wheezing, rales or rhonchi  Cardiovascular: Regular, S1 and S2, + murmurs, no rubs, gallops or clicks  Gastrointestinal: Bowel Sounds present, soft, nontender.   Lymph: No peripheral edema. No lymphadenopathy.  Skin: No visible rashes or ulcers.  Psych:  Mood & affect appropriate    LABS: All Labs Reviewed:                        8.4    7.52  )-----------( 253      ( 29 Jul 2021 06:39 )             27.8                         8.2    7.65  )-----------( 218      ( 28 Jul 2021 09:50 )             26.5                         9.0    8.36  )-----------( 251      ( 28 Jul 2021 03:40 )             29.8     29 Jul 2021 06:39    142    |  108    |  38     ----------------------------<  123    3.7     |  28     |  2.00   28 Jul 2021 09:49    140    |  106    |  39     ----------------------------<  171    3.8     |  25     |  2.30   28 Jul 2021 03:40    140    |  106    |  37     ----------------------------<  132    4.0     |  27     |  2.10     Ca    8.3        29 Jul 2021 06:39  Ca    8.1        28 Jul 2021 09:49  Ca    8.6        28 Jul 2021 03:40    TPro  6.2    /  Alb  3.1    /  TBili  0.3    /  DBili  x      /  AST  19     /  ALT  28     /  AlkPhos  31     28 Jul 2021 09:49  TPro  6.9    /  Alb  3.5    /  TBili  0.4    /  DBili  x      /  AST  25     /  ALT  33     /  AlkPhos  34     28 Jul 2021 03:40    CARDIAC MARKERS ( 29 Jul 2021 06:39 )  .051 ng/mL / x     / x     / x     / x      CARDIAC MARKERS ( 28 Jul 2021 09:49 )  .063 ng/mL / x     / 123 U/L / x     / 2.8 ng/mL     EXAM:  ECHO TTE WO CON COMP W DOPP      PROCEDURE DATE:  07/20/2021      INTERPRETATION:  INDICATION: Dyspnea  Sonographer KL    Blood Pressure 199/92    Height 172.7cm     Weight 99.8kg       BSA 2.13m sq    Dimensions:  LA 4.2       Normal Values: 2.0 - 4.0 cm  Ao 4.0        Normal Values: 2.0 - 3.8 cm  SEPTUM 1.7       Normal Values: 0.6 - 1.2 cm  PWT 1.7       Normal Values: 0.6 - 1.1 cm  LVIDd 5.2         Normal Values: 3.0 - 5.6 cm  LVIDs 3.8         Normal Values: 1.8 - 4.0 cm    OBSERVATIONS:  Mitral Valve: Thickened leaflets, mild MR.  Aortic Valve/Aorta: Calcified trileaflet aortic valve with decreased opening. Peak transaortic valve gradient is 28 mmHg with a mean transaortic valve gradient of 16 mmHg. Aortic valve area is calculus be 1.8 sq cm. This consistent with mild aortic stenosis.  Tricuspid Valve: normal with trace TR.  Pulmonic Valve: Not well-visualized  Left Atrium: Enlarged  Right Atrium: Not well-visualized  Left Ventricle: Normal left ventricular systolic function, estimated LVEF of 60%.  Right Ventricle: Grossly normal size and systolic function.  Pericardium: no significant pericardial effusion.  LV diastolic dysfunction is present    IMPRESSION:  Normal left ventricular systolic function, estimated LVEF of 60%.  Grossly normal RV size and systolic function.  Left atrial enlargement  Calcified trileaflet aortic valve with mild aortic stenosis, without AI.  Mild MR  Trace TR.  No significant pericardial effusion.    --- End of Report ---      VIET GREENWOOD MD; Attending Cardiologist  This document has been electronically signed. Jul 21 2021 12:31PM     EXAM:  XR CHEST PORTABLE IMMED 1V                          PROCEDURE DATE:  07/28/2021      INTERPRETATION:  AP chest on July 28, 2021 at 3:31 AM. Patient is short of breath.    Heart is enlarged similar to July 23.    Present film shows mild infiltration in the mid lower lung fields right greater than left suggesting CHF.    IMPRESSION: There are new mild lower lung field infiltrates likely congestive right greater than left.    --- End of Report ---    TRIPP STEVENS MD; Attending Radiologist  This document has been electronically signed. Jul 28 2021  8:03AM    Ventricular Rate 59 BPM    Atrial Rate 59 BPM    P-R Interval 302 ms    QRS Duration 114 ms    Q-T Interval 466 ms    QTC Calculation(Bazett) 461 ms    R Axis -16 degrees    T Axis 16 degrees    Diagnosis Line Sinus bradycardia with 1st degree AV block  Moderate voltage criteria for LVH, may be normal variant  Nonspecific T wave abnormality  Prolonged QT  Abnormal ECG  Confirmed by Bonifacio BARRERA, Victorino ([id number]) on 7/28/2021 2:41:28 PM

## 2021-08-20 ENCOUNTER — EMERGENCY (EMERGENCY)
Facility: HOSPITAL | Age: 76
LOS: 1 days | Discharge: HOME | End: 2021-08-20
Attending: STUDENT IN AN ORGANIZED HEALTH CARE EDUCATION/TRAINING PROGRAM | Admitting: STUDENT IN AN ORGANIZED HEALTH CARE EDUCATION/TRAINING PROGRAM
Payer: MEDICARE

## 2021-08-20 VITALS
HEART RATE: 66 BPM | DIASTOLIC BLOOD PRESSURE: 80 MMHG | RESPIRATION RATE: 18 BRPM | TEMPERATURE: 99 F | SYSTOLIC BLOOD PRESSURE: 157 MMHG | OXYGEN SATURATION: 98 %

## 2021-08-20 VITALS
OXYGEN SATURATION: 95 % | WEIGHT: 223.99 LBS | TEMPERATURE: 99 F | SYSTOLIC BLOOD PRESSURE: 180 MMHG | HEART RATE: 68 BPM | RESPIRATION RATE: 18 BRPM | HEIGHT: 68 IN | DIASTOLIC BLOOD PRESSURE: 90 MMHG

## 2021-08-20 DIAGNOSIS — I66.9 OCCLUSION AND STENOSIS OF UNSPECIFIED CEREBRAL ARTERY: Chronic | ICD-10-CM

## 2021-08-20 DIAGNOSIS — L05.91 PILONIDAL CYST WITHOUT ABSCESS: Chronic | ICD-10-CM

## 2021-08-20 DIAGNOSIS — Z98.89 OTHER SPECIFIED POSTPROCEDURAL STATES: Chronic | ICD-10-CM

## 2021-08-20 DIAGNOSIS — Z98.49 CATARACT EXTRACTION STATUS, UNSPECIFIED EYE: Chronic | ICD-10-CM

## 2021-08-20 DIAGNOSIS — N44.00 TORSION OF TESTIS, UNSPECIFIED: Chronic | ICD-10-CM

## 2021-08-20 LAB
ALBUMIN SERPL ELPH-MCNC: 3 G/DL — LOW (ref 3.3–5)
ALP SERPL-CCNC: 30 U/L — LOW (ref 40–120)
ALT FLD-CCNC: 21 U/L — SIGNIFICANT CHANGE UP (ref 12–78)
ANION GAP SERPL CALC-SCNC: 6 MMOL/L — SIGNIFICANT CHANGE UP (ref 5–17)
APTT BLD: 31 SEC — SIGNIFICANT CHANGE UP (ref 27.5–35.5)
AST SERPL-CCNC: 16 U/L — SIGNIFICANT CHANGE UP (ref 15–37)
BASOPHILS # BLD AUTO: 0.02 K/UL — SIGNIFICANT CHANGE UP (ref 0–0.2)
BASOPHILS NFR BLD AUTO: 0.3 % — SIGNIFICANT CHANGE UP (ref 0–2)
BILIRUB SERPL-MCNC: 0.3 MG/DL — SIGNIFICANT CHANGE UP (ref 0.2–1.2)
BUN SERPL-MCNC: 28 MG/DL — HIGH (ref 7–23)
CALCIUM SERPL-MCNC: 8.3 MG/DL — LOW (ref 8.5–10.1)
CHLORIDE SERPL-SCNC: 107 MMOL/L — SIGNIFICANT CHANGE UP (ref 96–108)
CO2 SERPL-SCNC: 26 MMOL/L — SIGNIFICANT CHANGE UP (ref 22–31)
CREAT SERPL-MCNC: 1.6 MG/DL — HIGH (ref 0.5–1.3)
CRP SERPL-MCNC: 20 MG/L — HIGH
EOSINOPHIL # BLD AUTO: 0.05 K/UL — SIGNIFICANT CHANGE UP (ref 0–0.5)
EOSINOPHIL NFR BLD AUTO: 0.7 % — SIGNIFICANT CHANGE UP (ref 0–6)
GLUCOSE SERPL-MCNC: 122 MG/DL — HIGH (ref 70–99)
HCT VFR BLD CALC: 28.7 % — LOW (ref 39–50)
HGB BLD-MCNC: 8.9 G/DL — LOW (ref 13–17)
IMM GRANULOCYTES NFR BLD AUTO: 0.3 % — SIGNIFICANT CHANGE UP (ref 0–1.5)
INR BLD: 1.16 RATIO — SIGNIFICANT CHANGE UP (ref 0.88–1.16)
LACTATE SERPL-SCNC: 1 MMOL/L — SIGNIFICANT CHANGE UP (ref 0.7–2)
LIDOCAIN IGE QN: 73 U/L — SIGNIFICANT CHANGE UP (ref 73–393)
LYMPHOCYTES # BLD AUTO: 0.77 K/UL — LOW (ref 1–3.3)
LYMPHOCYTES # BLD AUTO: 11.4 % — LOW (ref 13–44)
MCHC RBC-ENTMCNC: 25.8 PG — LOW (ref 27–34)
MCHC RBC-ENTMCNC: 31 GM/DL — LOW (ref 32–36)
MCV RBC AUTO: 83.2 FL — SIGNIFICANT CHANGE UP (ref 80–100)
MONOCYTES # BLD AUTO: 0.47 K/UL — SIGNIFICANT CHANGE UP (ref 0–0.9)
MONOCYTES NFR BLD AUTO: 7 % — SIGNIFICANT CHANGE UP (ref 2–14)
NEUTROPHILS # BLD AUTO: 5.41 K/UL — SIGNIFICANT CHANGE UP (ref 1.8–7.4)
NEUTROPHILS NFR BLD AUTO: 80.3 % — HIGH (ref 43–77)
NRBC # BLD: 0 /100 WBCS — SIGNIFICANT CHANGE UP (ref 0–0)
NT-PROBNP SERPL-SCNC: 4140 PG/ML — HIGH (ref 0–450)
PLATELET # BLD AUTO: 242 K/UL — SIGNIFICANT CHANGE UP (ref 150–400)
POTASSIUM SERPL-MCNC: 4 MMOL/L — SIGNIFICANT CHANGE UP (ref 3.5–5.3)
POTASSIUM SERPL-SCNC: 4 MMOL/L — SIGNIFICANT CHANGE UP (ref 3.5–5.3)
PROT SERPL-MCNC: 6.4 G/DL — SIGNIFICANT CHANGE UP (ref 6–8.3)
PROTHROM AB SERPL-ACNC: 13.5 SEC — SIGNIFICANT CHANGE UP (ref 10.6–13.6)
RAPID RVP RESULT: SIGNIFICANT CHANGE UP
RBC # BLD: 3.45 M/UL — LOW (ref 4.2–5.8)
RBC # FLD: 18.4 % — HIGH (ref 10.3–14.5)
SARS-COV-2 RNA SPEC QL NAA+PROBE: SIGNIFICANT CHANGE UP
SODIUM SERPL-SCNC: 139 MMOL/L — SIGNIFICANT CHANGE UP (ref 135–145)
TROPONIN I SERPL-MCNC: 0.04 NG/ML — SIGNIFICANT CHANGE UP (ref 0.01–0.04)
WBC # BLD: 6.74 K/UL — SIGNIFICANT CHANGE UP (ref 3.8–10.5)
WBC # FLD AUTO: 6.74 K/UL — SIGNIFICANT CHANGE UP (ref 3.8–10.5)

## 2021-08-20 PROCEDURE — 73630 X-RAY EXAM OF FOOT: CPT

## 2021-08-20 PROCEDURE — 71045 X-RAY EXAM CHEST 1 VIEW: CPT | Mod: 26

## 2021-08-20 PROCEDURE — 85025 COMPLETE CBC W/AUTO DIFF WBC: CPT

## 2021-08-20 PROCEDURE — 85652 RBC SED RATE AUTOMATED: CPT

## 2021-08-20 PROCEDURE — 80053 COMPREHEN METABOLIC PANEL: CPT

## 2021-08-20 PROCEDURE — 0225U NFCT DS DNA&RNA 21 SARSCOV2: CPT

## 2021-08-20 PROCEDURE — 93005 ELECTROCARDIOGRAM TRACING: CPT

## 2021-08-20 PROCEDURE — 99285 EMERGENCY DEPT VISIT HI MDM: CPT

## 2021-08-20 PROCEDURE — 83605 ASSAY OF LACTIC ACID: CPT

## 2021-08-20 PROCEDURE — 96375 TX/PRO/DX INJ NEW DRUG ADDON: CPT

## 2021-08-20 PROCEDURE — 83880 ASSAY OF NATRIURETIC PEPTIDE: CPT

## 2021-08-20 PROCEDURE — 99285 EMERGENCY DEPT VISIT HI MDM: CPT | Mod: 25

## 2021-08-20 PROCEDURE — 36415 COLL VENOUS BLD VENIPUNCTURE: CPT

## 2021-08-20 PROCEDURE — 93010 ELECTROCARDIOGRAM REPORT: CPT

## 2021-08-20 PROCEDURE — 76705 ECHO EXAM OF ABDOMEN: CPT | Mod: 26,RT

## 2021-08-20 PROCEDURE — 85610 PROTHROMBIN TIME: CPT

## 2021-08-20 PROCEDURE — 85730 THROMBOPLASTIN TIME PARTIAL: CPT

## 2021-08-20 PROCEDURE — 87040 BLOOD CULTURE FOR BACTERIA: CPT

## 2021-08-20 PROCEDURE — 76705 ECHO EXAM OF ABDOMEN: CPT

## 2021-08-20 PROCEDURE — 84484 ASSAY OF TROPONIN QUANT: CPT

## 2021-08-20 PROCEDURE — 83690 ASSAY OF LIPASE: CPT

## 2021-08-20 PROCEDURE — 96374 THER/PROPH/DIAG INJ IV PUSH: CPT

## 2021-08-20 PROCEDURE — 73630 X-RAY EXAM OF FOOT: CPT | Mod: 26,RT

## 2021-08-20 PROCEDURE — 86140 C-REACTIVE PROTEIN: CPT

## 2021-08-20 PROCEDURE — 71045 X-RAY EXAM CHEST 1 VIEW: CPT

## 2021-08-20 RX ORDER — CEFAZOLIN SODIUM 1 G
1000 VIAL (EA) INJECTION ONCE
Refills: 0 | Status: COMPLETED | OUTPATIENT
Start: 2021-08-20 | End: 2021-08-20

## 2021-08-20 RX ORDER — FUROSEMIDE 40 MG
40 TABLET ORAL ONCE
Refills: 0 | Status: COMPLETED | OUTPATIENT
Start: 2021-08-20 | End: 2021-08-20

## 2021-08-20 RX ORDER — FUROSEMIDE 40 MG
1 TABLET ORAL
Qty: 0 | Refills: 0 | DISCHARGE
Start: 2021-08-20 | End: 2021-08-29

## 2021-08-20 RX ORDER — ACETAMINOPHEN 500 MG
650 TABLET ORAL ONCE
Refills: 0 | Status: COMPLETED | OUTPATIENT
Start: 2021-08-20 | End: 2021-08-20

## 2021-08-20 RX ORDER — CEPHALEXIN 500 MG
1 CAPSULE ORAL
Qty: 28 | Refills: 0
Start: 2021-08-20 | End: 2021-08-26

## 2021-08-20 RX ORDER — FUROSEMIDE 40 MG
1 TABLET ORAL
Qty: 10 | Refills: 0
Start: 2021-08-20 | End: 2021-08-29

## 2021-08-20 RX ADMIN — Medication 650 MILLIGRAM(S): at 13:12

## 2021-08-20 RX ADMIN — Medication 650 MILLIGRAM(S): at 08:34

## 2021-08-20 RX ADMIN — Medication 100 MILLIGRAM(S): at 13:25

## 2021-08-20 RX ADMIN — Medication 40 MILLIGRAM(S): at 13:24

## 2021-08-20 NOTE — ED ADULT NURSE NOTE - OBJECTIVE STATEMENT
Pt received in bed alert and oriented and resting in bed with the c/o fever, weakness, body aches and pains and inability to ambulate since of lately. It was noted that pt has un-stageable wound to right foot 3rd digit. Pt states that he had a callus there and ripped it off about a week ago. As per MD's orders IV kaylee placed blood specimen obtained and sent to the lab. Pt stable and Tylenol given PO and tolerated well.

## 2021-08-20 NOTE — ED PROVIDER NOTE - PHYSICAL EXAMINATION
Gen:  NAD  Head: NC/AT  Neck: trachea midline  cv: rrr  Resp:  No distress, tachypneic  abd: ruq tenderness   Ext: no deformities  Neuro:  A&O appears non focal  Skin:  Warm and dry as visualized  Psych:  Normal affect and mood Gen:  NAD  Head: NC/AT  Neck: trachea midline  cv: rrr  Resp:  No distress, tachypneic  abd: ruq tenderness   Ext: no deformities  Neuro:  A&O appears non focal  Skin:  erythema and warmth noted to right foot 3rd digit   Psych:  Normal affect and mood

## 2021-08-20 NOTE — CONSULT NOTE ADULT - ATTENDING COMMENTS
Mild fluid overload.  To get IV Lasix in ED, and be started on PO Lasix at facility.  Need to follow K and creatinine as outpatient.  HE wishes to follow with Dr. Monterroso.

## 2021-08-20 NOTE — ED PROVIDER NOTE - NSFOLLOWUPINSTRUCTIONS_ED_ALL_ED_FT
1. TAKE ALL MEDICATIONS AS DIRECTED.    Take keflex 4 times a day for toe infection,  Take lasix once a day until you see your doctor or cardiologist  2. FOR PAIN OR FEVER YOU CAN TAKE ACETAMINOPHEN (TYLENOL) AS NEEDED, AS DIRECTED ON PACKAGING.  3. FOLLOW UP WITH YOUR PRIMARY DOCTOR WITHIN 5 DAYS AS DIRECTED.  4. IF YOU HAD LABS OR IMAGING DONE, YOU WERE GIVEN COPIES OF ALL LABS AND/OR IMAGING RESULTS FROM YOUR ER VISIT--PLEASE TAKE THEM WITH YOU TO YOUR FOLLOW UP APPOINTMENTS.  5. IF NEEDED, CALL PATIENT ACCESS SERVICES AT [phone] TO FIND A PRIMARY CARE PHYSICIAN.  OR CALL [phone] TO MAKE AN APPOINTMENT WITH THE CLINIC.  6. RETURN TO THE ER FOR ANY WORSENING SYMPTOMS OR CONCERNS.    Cellulitis    Cellulitis is a skin infection caused by bacteria. This condition occurs most often in the arms and lower legs but can occur anywhere over the body. Symptoms include redness, swelling, warm skin, tenderness, and chills/fever. If you were prescribed an antibiotic medicine, take it as told by your health care provider. Do not stop taking the antibiotic even if you start to feel better.    SEEK IMMEDIATE MEDICAL CARE IF YOU HAVE ANY OF THE FOLLOWING SYMPTOMS: worsening fever, red streaks coming from affected area, vomiting or diarrhea, or dizziness/lightheadedness.

## 2021-08-20 NOTE — ED PROVIDER NOTE - OBJECTIVE STATEMENT
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 pt currently with sob, states he is always having sob, no chest pain. was recently admitted for diverticulitis and then was admitted fof hypertensive urgency/chf EF 60%

## 2021-08-20 NOTE — ED PROVIDER NOTE - COVID-19 RESULT DATE/TIME
28-Jul-2021 04:34 71F with PMHx of Sjogrens, SBO s/p ileostomy (2017), GERD, sciatica/71F with PMHx of Sjogrens, hypothyroidism, SBO s/p ileostomy (2017), GERD, sciatica/back presents with 2 day h/o anterior L neck pain, back presents with 2 day h/o anterior L neck pain, admitted for ACS workup.

## 2021-08-20 NOTE — ED PROVIDER NOTE - CLINICAL SUMMARY MEDICAL DECISION MAKING FREE TEXT BOX
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

## 2021-08-20 NOTE — ED PROVIDER NOTE - PROGRESS NOTE DETAILS
seen by braulio, recs for iv lasix now, dc with 20 lasix and follow up, spoke with nurse manager at [facility], exaplined discharge instructions, agrees with plan

## 2021-08-20 NOTE — ED PROVIDER NOTE - PATIENT PORTAL LINK FT
You can access the FollowMyHealth Patient Portal offered by [facility] by registering at the following website: [email or web address]. By joining [facility]’s FollowMyHealth portal, you will also be able to view your health information using other applications (apps) compatible with our system.

## 2021-08-20 NOTE — ED ADULT TRIAGE NOTE - CHIEF COMPLAINT QUOTE
c/o flu like symptoms since yesterday with bodyaches, * 2 days - also c/o high blood pressure- c/o flu like symptoms since yesterday with bodyaches, * 2 days - also c/o high blood pressure- also c/o stiff neck * 3 weeks

## 2021-08-20 NOTE — CONSULT NOTE ADULT - ASSESSMENT
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 pt currently with sob is being consulted for chronic CHF    #Chronic CHF:  - Patient presented from assisted living facility with c/o progressive worsening of dyspnia  - Claims that now he gets dyspnic walking to the bathroom  - Currently on no diuretics  - EKG with NS 1st degree heart block, with nos specific T wave changes. No evidence of acute ischemic changes   - Cardiac enzyme negative, ProBNP: 4140  - Recent ECHO in [facility] shows normal LV function with EF 60%  - Decreased breath sounds on exam, mildly elevated JVP, mild peripheral edema and Chest -Xray with blunting of left costophrenic angles suggest mild fluid over load  - No sign of acute decompensation  - Will recommend Lasix 40mg IV x1 and continue with Lasix 20mg qd  - No further cardiac work up needed at this time  - Patient may be discharged from cardiac stand point  - Follow up with Dr Monterroso as out patient    #HTN:   - Chronic stable  - vital signs are stable  - continue with Amlodipine clonidine hydralazine and labetalol   - will recommend Lasix 20mg qd   - Follow up with Dr. Monterroso as out patient

## 2021-08-20 NOTE — CONSULT NOTE ADULT - SUBJECTIVE AND OBJECTIVE BOX
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 pt currently with sob, states he is always having sob, no chest pain. was recently admitted for diverticulitis and then was admitted fof hypertensive urgency/chf EF 60%  Patient states that he has been having progressive dyspnia for the past few months. Claims that he feels winded walk to and from the bathroom. Informs that he has been using 2-3 pillows at night to go to sleep. Denies orthopia or paroxsymal nocturnal dyspnia. Denies any chest pain, or palpitations. No nausea/vomiting       PAST MEDICAL & SURGICAL HISTORY:  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  Had surgery ( April 2013 )    S/P tonsillectomy    S/P arthroscopic knee surgery  Bilateral ( 2005 )    Torsion of testicle  Had surgery at age 14    Pilonidal cyst  Had surgery ( 1969 )    S/P cataract surgery  Bilateral    H/O hernia repair        SOCIAL HISTORY: No active tobacco, alcohol or illicit drug use    FAMILY HISTORY:  FHx: throat cancer    No family history of colorectal cancer        Outpatient medications:    MEDICATIONS  (STANDING):  ceFAZolin   IVPB 1000 milliGRAM(s) IV Intermittent once    MEDICATIONS  (PRN):      Allergies    No Known Allergies    Intolerances        REVIEW OF SYSTEMS:   CONSTITUTIONAL: denies fever, chills, fatigue, weakness  HEENT: denies blurred vision, sore throat  SKIN: denies new lesions, rash  CARDIOVASCULAR: denies chest pain, chest pressure, palpitations  RESPIRATORY: Admits shortness of breath, denies sputum production  GASTROINTESTINAL: denies nausea, vomiting, diarrhea, abdominal pain  GENITOURINARY: denies dysuria, discharge  NEUROLOGICAL: denies numbness, headache, focal weakness  MUSCULOSKELETAL: denies new joint pain, muscle aches  HEMATOLOGIC: denies gross bleeding, bruising  LYMPHATICS: denies enlarged lymph nodes, extremity swelling  PSYCHIATRIC: denies recent changes in anxiety, depression  ENDOCRINOLOGIC: denies sweating, cold or heat intolerance    VITAL SIGNS:   Vital Signs Last 24 Hrs  T(C): 36.9 (20 Aug 2021 10:09), Max: 37.4 (20 Aug 2021 07:17)  T(F): 98.4 (20 Aug 2021 10:09), Max: 99.4 (20 Aug 2021 07:17)  HR: 59 (20 Aug 2021 10:09) (59 - 68)  BP: 162/89 (20 Aug 2021 10:09) (162/89 - 180/90)  BP(mean): --  RR: 18 (20 Aug 2021 07:17) (18 - 18)  SpO2: 98% (20 Aug 2021 10:09) (95% - 98%)    I&O's Summary      PHYSICAL EXAM:  Constitutional: NAD, awake and alert, well-developed  Eyes:  EOMI, no oral cyanosis, conjunctivae clear, anicteric  Pulmonary: Non-labored, breath sounds are clear bilaterally, no wheezing, rales or rhonchi  Cardiovascular:  irregular S1 and S2, in af, tachy normal rate. No murmur.  No rubs, gallops or clicks  Gastrointestinal: Bowel Sounds present, soft, nontender  Lymph: No peripheral edema   Neurological: Alert, strength and sensitivity are grossly intact  Skin: No obvious lesions/rashes  Psych:  Mood & affect appropriate    LABS: All Labs Reviewed:                        8.9    6.74  )-----------( 242      ( 20 Aug 2021 09:01 )             28.7     20 Aug 2021 09:01    139    |  107    |  28     ----------------------------<  122    4.0     |  26     |  1.60     Ca    8.3        20 Aug 2021 09:01    TPro  6.4    /  Alb  3.0    /  TBili  0.3    /  DBili  x      /  AST  16     /  ALT  21     /  AlkPhos  30     20 Aug 2021 09:01    PT/INR - ( 20 Aug 2021 09:01 )   PT: 13.5 sec;   INR: 1.16 ratio         PTT - ( 20 Aug 2021 09:01 )  PTT:31.0 sec  CARDIAC MARKERS ( 20 Aug 2021 09:01 )  .037 ng/mL / x     / x     / x     / x          Blood Culture:   08-20 @ 09:01  Pro Bnp 4140        RADIOLOGY:    EKG:   [facility]         Fifi Bliss, Bonifacio, Kalpesh, Fatimah, Loc Rosario        [phone] (office)    CHIEF COMPLAINT: Patient is a 76y old  Male who presents with a chief complaint of     HPI: The patient is a 77 y/o M with PMHx of Type 2 DM (not on insulin), CAD s/p stents >4 years ago (off plavix), Lupus, HTN, HLD, CKD stage 3, HepC, diverticulosis, 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 pt currently with sob, states he is always having sob, no chest pain. was recently admitted for diverticulitis and then was admitted fof hypertensive urgency/chf EF 60%  Patient states that he has been having progressive dyspnia for the past few months. Claims that he feels winded walk to and from the bathroom. Informs that he has been using 2-3 pillows at night to go to sleep. Denies orthopia or paroxsymal nocturnal dyspnia. Denies any chest pain, or palpitations. No nausea/vomiting       PAST MEDICAL & SURGICAL HISTORY:  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  Had surgery ( April 2013 )    S/P tonsillectomy    S/P arthroscopic knee surgery  Bilateral ( 2005 )    Torsion of testicle  Had surgery at age 14    Pilonidal cyst  Had surgery ( 1969 )    S/P cataract surgery  Bilateral    H/O hernia repair        SOCIAL HISTORY: No active tobacco, alcohol or illicit drug use    FAMILY HISTORY:  FHx: throat cancer    No family history of colorectal cancer        Outpatient medications:    MEDICATIONS  (STANDING):  ceFAZolin   IVPB 1000 milliGRAM(s) IV Intermittent once    MEDICATIONS  (PRN):      Allergies    No Known Allergies    Intolerances        REVIEW OF SYSTEMS:   CONSTITUTIONAL: denies fever, chills, fatigue, weakness  HEENT: denies blurred vision, sore throat  SKIN: denies new lesions, rash  CARDIOVASCULAR: denies chest pain, chest pressure, palpitations  RESPIRATORY: Admits shortness of breath, denies sputum production  GASTROINTESTINAL: denies nausea, vomiting, diarrhea, abdominal pain  GENITOURINARY: denies dysuria, discharge  NEUROLOGICAL: denies numbness, headache, focal weakness  MUSCULOSKELETAL: denies new joint pain, muscle aches  HEMATOLOGIC: denies gross bleeding, bruising  LYMPHATICS: denies enlarged lymph nodes, extremity swelling  PSYCHIATRIC: denies recent changes in anxiety, depression  ENDOCRINOLOGIC: denies sweating, cold or heat intolerance    VITAL SIGNS:   Vital Signs Last 24 Hrs  T(C): 36.9 (20 Aug 2021 10:09), Max: 37.4 (20 Aug 2021 07:17)  T(F): 98.4 (20 Aug 2021 10:09), Max: 99.4 (20 Aug 2021 07:17)  HR: 59 (20 Aug 2021 10:09) (59 - 68)  BP: 162/89 (20 Aug 2021 10:09) (162/89 - 180/90)  BP(mean): --  RR: 18 (20 Aug 2021 07:17) (18 - 18)  SpO2: 98% (20 Aug 2021 10:09) (95% - 98%)    I&O's Summary      PHYSICAL EXAM:  Constitutional:  well-developed male who is sitting in bed and is in NAD  Eyes: EOMI, no oral cyanosis, conjunctivae clear, anicteric  Pulmonary: Non-labored, breath sounds mildly decreased bilaterally, no wheezing, rales or rhonchi  Cardiovascular:  RRR, normal S1 and S2 No murmur.  No rubs, gallops or clicks, mildly elevated JVP  Gastrointestinal: Bowel Sounds present, soft, nontender  Lymph: 1+ B/L LE edema   Neurological: AAO x3,, strength and sensitivity are grossly intact  Skin: No obvious lesions/rashes  Psych:  Mood & affect appropriate    LABS: All Labs Reviewed:                        8.9    6.74  )-----------( 242      ( 20 Aug 2021 09:01 )             28.7     20 Aug 2021 09:01    139    |  107    |  28     ----------------------------<  122    4.0     |  26     |  1.60     Ca    8.3        20 Aug 2021 09:01    TPro  6.4    /  Alb  3.0    /  TBili  0.3    /  DBili  x      /  AST  16     /  ALT  21     /  AlkPhos  30     20 Aug 2021 09:01    PT/INR - ( 20 Aug 2021 09:01 )   PT: 13.5 sec;   INR: 1.16 ratio         PTT - ( 20 Aug 2021 09:01 )  PTT:31.0 sec  CARDIAC MARKERS ( 20 Aug 2021 09:01 )  .037 ng/mL / x     / x     / x     / x          Blood Culture:   08-20 @ 09:01  Pro Bnp 4140        RADIOLOGY:    EKG:

## 2021-08-20 NOTE — ED PROVIDER NOTE - CARE PROVIDER_API CALL
Daryl Monterroso)  Cardiovascular Disease; Internal Medicine  [address], [place] [id number]  Phone: [phone]  Fax: [phone]  Follow Up Time: 4-6 Days

## 2021-08-25 LAB
CULTURE RESULTS: SIGNIFICANT CHANGE UP
CULTURE RESULTS: SIGNIFICANT CHANGE UP
SPECIMEN SOURCE: SIGNIFICANT CHANGE UP
SPECIMEN SOURCE: SIGNIFICANT CHANGE UP

## 2021-09-15 NOTE — ED ADULT NURSE NOTE - CAS TRG GEN SKIN CONDITION
Detail Level: Simple
Instructions: This plan will send the code FBSD to the PM system.  DO NOT or CHANGE the price.
Price (Do Not Change): 0.00
Warm

## 2021-10-06 ENCOUNTER — INPATIENT (INPATIENT)
Facility: HOSPITAL | Age: 76
LOS: 9 days | Discharge: NURSING, REHAB OR LONG-TERM CARE | DRG: 682 | End: 2021-10-16
Attending: INTERNAL MEDICINE | Admitting: INTERNAL MEDICINE
Payer: MEDICARE

## 2021-10-06 VITALS
DIASTOLIC BLOOD PRESSURE: 100 MMHG | WEIGHT: 225.09 LBS | HEIGHT: 68 IN | RESPIRATION RATE: 16 BRPM | HEART RATE: 80 BPM | OXYGEN SATURATION: 96 % | SYSTOLIC BLOOD PRESSURE: 210 MMHG | TEMPERATURE: 98 F

## 2021-10-06 DIAGNOSIS — Z98.89 OTHER SPECIFIED POSTPROCEDURAL STATES: Chronic | ICD-10-CM

## 2021-10-06 DIAGNOSIS — L05.91 PILONIDAL CYST WITHOUT ABSCESS: Chronic | ICD-10-CM

## 2021-10-06 DIAGNOSIS — Z98.49 CATARACT EXTRACTION STATUS, UNSPECIFIED EYE: Chronic | ICD-10-CM

## 2021-10-06 DIAGNOSIS — N44.00 TORSION OF TESTIS, UNSPECIFIED: Chronic | ICD-10-CM

## 2021-10-06 DIAGNOSIS — I66.9 OCCLUSION AND STENOSIS OF UNSPECIFIED CEREBRAL ARTERY: Chronic | ICD-10-CM

## 2021-10-06 LAB
ALBUMIN SERPL ELPH-MCNC: 3.2 G/DL — LOW (ref 3.3–5)
ALP SERPL-CCNC: 41 U/L — SIGNIFICANT CHANGE UP (ref 40–120)
ALT FLD-CCNC: 25 U/L — SIGNIFICANT CHANGE UP (ref 12–78)
ANION GAP SERPL CALC-SCNC: 5 MMOL/L — SIGNIFICANT CHANGE UP (ref 5–17)
APPEARANCE UR: CLEAR — SIGNIFICANT CHANGE UP
APTT BLD: 36.3 SEC — HIGH (ref 27.5–35.5)
AST SERPL-CCNC: 18 U/L — SIGNIFICANT CHANGE UP (ref 15–37)
BACTERIA # UR AUTO: ABNORMAL
BASOPHILS # BLD AUTO: 0.03 K/UL — SIGNIFICANT CHANGE UP (ref 0–0.2)
BASOPHILS NFR BLD AUTO: 0.4 % — SIGNIFICANT CHANGE UP (ref 0–2)
BILIRUB SERPL-MCNC: 0.2 MG/DL — SIGNIFICANT CHANGE UP (ref 0.2–1.2)
BILIRUB UR-MCNC: NEGATIVE — SIGNIFICANT CHANGE UP
BUN SERPL-MCNC: 36 MG/DL — HIGH (ref 7–23)
CALCIUM SERPL-MCNC: 9.5 MG/DL — SIGNIFICANT CHANGE UP (ref 8.5–10.1)
CHLORIDE SERPL-SCNC: 111 MMOL/L — HIGH (ref 96–108)
CO2 SERPL-SCNC: 27 MMOL/L — SIGNIFICANT CHANGE UP (ref 22–31)
COLOR SPEC: YELLOW — SIGNIFICANT CHANGE UP
CREAT SERPL-MCNC: 2.3 MG/DL — HIGH (ref 0.5–1.3)
DIFF PNL FLD: ABNORMAL
EOSINOPHIL # BLD AUTO: 0.21 K/UL — SIGNIFICANT CHANGE UP (ref 0–0.5)
EOSINOPHIL NFR BLD AUTO: 2.5 % — SIGNIFICANT CHANGE UP (ref 0–6)
EPI CELLS # UR: SIGNIFICANT CHANGE UP
GLUCOSE SERPL-MCNC: 122 MG/DL — HIGH (ref 70–99)
GLUCOSE UR QL: NEGATIVE — SIGNIFICANT CHANGE UP
HCT VFR BLD CALC: 33.2 % — LOW (ref 39–50)
HGB BLD-MCNC: 10.1 G/DL — LOW (ref 13–17)
IMM GRANULOCYTES NFR BLD AUTO: 0.2 % — SIGNIFICANT CHANGE UP (ref 0–1.5)
INR BLD: 1.2 RATIO — HIGH (ref 0.88–1.16)
KETONES UR-MCNC: NEGATIVE — SIGNIFICANT CHANGE UP
LACTATE SERPL-SCNC: 1.1 MMOL/L — SIGNIFICANT CHANGE UP (ref 0.7–2)
LEUKOCYTE ESTERASE UR-ACNC: NEGATIVE — SIGNIFICANT CHANGE UP
LYMPHOCYTES # BLD AUTO: 1.87 K/UL — SIGNIFICANT CHANGE UP (ref 1–3.3)
LYMPHOCYTES # BLD AUTO: 22 % — SIGNIFICANT CHANGE UP (ref 13–44)
MCHC RBC-ENTMCNC: 25.9 PG — LOW (ref 27–34)
MCHC RBC-ENTMCNC: 30.4 GM/DL — LOW (ref 32–36)
MCV RBC AUTO: 85.1 FL — SIGNIFICANT CHANGE UP (ref 80–100)
MONOCYTES # BLD AUTO: 0.73 K/UL — SIGNIFICANT CHANGE UP (ref 0–0.9)
MONOCYTES NFR BLD AUTO: 8.6 % — SIGNIFICANT CHANGE UP (ref 2–14)
NEUTROPHILS # BLD AUTO: 5.63 K/UL — SIGNIFICANT CHANGE UP (ref 1.8–7.4)
NEUTROPHILS NFR BLD AUTO: 66.3 % — SIGNIFICANT CHANGE UP (ref 43–77)
NITRITE UR-MCNC: NEGATIVE — SIGNIFICANT CHANGE UP
NRBC # BLD: 0 /100 WBCS — SIGNIFICANT CHANGE UP (ref 0–0)
NT-PROBNP SERPL-SCNC: 8061 PG/ML — HIGH (ref 0–450)
PH UR: 6 — SIGNIFICANT CHANGE UP (ref 5–8)
PLATELET # BLD AUTO: 302 K/UL — SIGNIFICANT CHANGE UP (ref 150–400)
POTASSIUM SERPL-MCNC: 3.5 MMOL/L — SIGNIFICANT CHANGE UP (ref 3.5–5.3)
POTASSIUM SERPL-SCNC: 3.5 MMOL/L — SIGNIFICANT CHANGE UP (ref 3.5–5.3)
PROT SERPL-MCNC: 7.5 G/DL — SIGNIFICANT CHANGE UP (ref 6–8.3)
PROT UR-MCNC: 500 MG/DL
PROTHROM AB SERPL-ACNC: 13.9 SEC — HIGH (ref 10.6–13.6)
RBC # BLD: 3.9 M/UL — LOW (ref 4.2–5.8)
RBC # FLD: 17.3 % — HIGH (ref 10.3–14.5)
RBC CASTS # UR COMP ASSIST: ABNORMAL /HPF (ref 0–4)
SALICYLATES SERPL-MCNC: 3.3 MG/DL — SIGNIFICANT CHANGE UP (ref 2.8–20)
SODIUM SERPL-SCNC: 143 MMOL/L — SIGNIFICANT CHANGE UP (ref 135–145)
SP GR SPEC: 1.02 — SIGNIFICANT CHANGE UP (ref 1.01–1.02)
TROPONIN I SERPL-MCNC: 0.15 NG/ML — HIGH (ref 0.01–0.04)
UROBILINOGEN FLD QL: NEGATIVE — SIGNIFICANT CHANGE UP
WBC # BLD: 8.49 K/UL — SIGNIFICANT CHANGE UP (ref 3.8–10.5)
WBC # FLD AUTO: 8.49 K/UL — SIGNIFICANT CHANGE UP (ref 3.8–10.5)
WBC UR QL: SIGNIFICANT CHANGE UP

## 2021-10-06 PROCEDURE — 71045 X-RAY EXAM CHEST 1 VIEW: CPT | Mod: 26

## 2021-10-06 PROCEDURE — 93010 ELECTROCARDIOGRAM REPORT: CPT

## 2021-10-06 PROCEDURE — 99285 EMERGENCY DEPT VISIT HI MDM: CPT

## 2021-10-06 RX ORDER — SODIUM CHLORIDE 9 MG/ML
2100 INJECTION INTRAMUSCULAR; INTRAVENOUS; SUBCUTANEOUS ONCE
Refills: 0 | Status: COMPLETED | OUTPATIENT
Start: 2021-10-06 | End: 2021-10-06

## 2021-10-06 RX ORDER — CEFTRIAXONE 500 MG/1
1000 INJECTION, POWDER, FOR SOLUTION INTRAMUSCULAR; INTRAVENOUS ONCE
Refills: 0 | Status: COMPLETED | OUTPATIENT
Start: 2021-10-06 | End: 2021-10-06

## 2021-10-06 RX ORDER — LABETALOL HCL 100 MG
10 TABLET ORAL ONCE
Refills: 0 | Status: COMPLETED | OUTPATIENT
Start: 2021-10-06 | End: 2021-10-06

## 2021-10-06 RX ORDER — HYDRALAZINE HCL 50 MG
10 TABLET ORAL ONCE
Refills: 0 | Status: COMPLETED | OUTPATIENT
Start: 2021-10-06 | End: 2021-10-06

## 2021-10-06 RX ADMIN — Medication 10 MILLIGRAM(S): at 21:50

## 2021-10-06 RX ADMIN — SODIUM CHLORIDE 2100 MILLILITER(S): 9 INJECTION INTRAMUSCULAR; INTRAVENOUS; SUBCUTANEOUS at 22:12

## 2021-10-06 RX ADMIN — SODIUM CHLORIDE 2100 MILLILITER(S): 9 INJECTION INTRAMUSCULAR; INTRAVENOUS; SUBCUTANEOUS at 22:51

## 2021-10-06 RX ADMIN — CEFTRIAXONE 100 MILLIGRAM(S): 500 INJECTION, POWDER, FOR SOLUTION INTRAMUSCULAR; INTRAVENOUS at 22:11

## 2021-10-06 RX ADMIN — Medication 10 MILLIGRAM(S): at 22:51

## 2021-10-06 RX ADMIN — CEFTRIAXONE 1000 MILLIGRAM(S): 500 INJECTION, POWDER, FOR SOLUTION INTRAMUSCULAR; INTRAVENOUS at 22:51

## 2021-10-06 NOTE — ED PROVIDER NOTE - CARE PLAN
1 Principal Discharge DX:	Acute CHF  Secondary Diagnosis:	Afib  Secondary Diagnosis:	Acute kidney injury superimposed on CKD  Secondary Diagnosis:	Hypertension   Principal Discharge DX:	Acute CHF  Secondary Diagnosis:	Afib  Secondary Diagnosis:	Acute kidney injury superimposed on CKD  Secondary Diagnosis:	Hypertension  Secondary Diagnosis:	NSTEMI (non-ST elevation myocardial infarction)   Principal Discharge DX:	Acute CHF  Secondary Diagnosis:	Afib  Secondary Diagnosis:	Acute kidney injury superimposed on CKD  Secondary Diagnosis:	Hypertension  Secondary Diagnosis:	NSTEMI (non-ST elevation myocardial infarction)  Secondary Diagnosis:	Multifocal pneumonia

## 2021-10-06 NOTE — ED PROVIDER NOTE - SECONDARY DIAGNOSIS.
Afib Hypertension Acute kidney injury superimposed on CKD NSTEMI (non-ST elevation myocardial infarction) Multifocal pneumonia

## 2021-10-06 NOTE — CONSULT NOTE ADULT - SUBJECTIVE AND OBJECTIVE BOX
[facility] [place]     Patient is a 76y old  Male who presents with a chief complaint of multilobar PNA, afib (07 Oct 2021 01:24)      HPI:  75 yo M with PMHx of Type 2 DM (not on insulin), CAD s/p stents >4 years ago (not on plavix), diverticulitis (hospitalized 2020 at [facility]), GIB 2/2 diverticulosis (), anxiety (on multiple psych medications), Lupus, HTN, HLD, CKD stage 3, HepC, diverticulosis, hx of blood clots in brain (s/p surgery ) living in a group home [facility] presenting to [facility] today with multiple concerns including subjective fevers, SOB, weakness, and brief episodes of palpitations, and urinary frequency "for at least 3 days". Pt states he would intermittently feel chills and feverish, recorded no temps at group home. Endorses orthostasis. Denies recent sick contacts, recent travel hx. Last 3 days, Pt experienced SOB beyond his baseline (uses symbicort inhaler PRN). Denies SOB association with exertion. States he has been weaker, usually ambulates independently but now feels unable to hold his weight. Denies LOC, recent falls, vision loss, hemiparesis, convulsions, incontinence. Regarding palpitations, pt states for 3 days his heart would skip a beat, return to normal. Self limited episodes under a minute, no associated chest pain. Pt also describes urinary frequency beyond baseline, denies dysuria or incontinence.        (07 Oct 2021 01:24)      HPI:    76y Male for Cardiology Consult    PAST MEDICAL & SURGICAL HISTORY:  HTN (hypertension)  c/b multiple episodes of hypertensive urgency    HLD (hyperlipidemia)    Atrial fibrillation  first documented on EKG 10/7/2021    CAD (coronary artery disease)  s/p stents (not on plavix)    Type 2 diabetes mellitus  not on home insulin    Anxiety  multiple psych medications    History of diverticulitis  2021    Diverticulosis  c/b GIB in     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  Had surgery ( 2013 )    S/P tonsillectomy    S/P arthroscopic knee surgery  Bilateral (  )    Torsion of testicle  Had surgery at age 14    Pilonidal cyst  Had surgery (  )    S/P cataract surgery  Bilateral    H/O hernia repair        FAMILY HISTORY:  FHx: throat cancer    No family history of colorectal cancer        SOCIAL HISTORY:   Alcohol:  Smoking:    Allergies    No Known Allergies    Intolerances        MEDICATIONS  (STANDING):  amLODIPine   Tablet 10 milliGRAM(s) Oral daily  ARIPiprazole 30 milliGRAM(s) Oral daily  aspirin enteric coated 81 milliGRAM(s) Oral daily  azithromycin  IVPB 500 milliGRAM(s) IV Intermittent once  budesonide 160 MICROgram(s)/formoterol 4.5 MICROgram(s) Inhaler 2 Puff(s) Inhalation two times a day  cloNIDine 0.2 milliGRAM(s) Oral three times a day  dextrose 40% Gel 15 Gram(s) Oral once  dextrose 5%. 1000 milliLiter(s) (50 mL/Hr) IV Continuous <Continuous>  dextrose 50% Injectable 25 Gram(s) IV Push once  digoxin     Tablet 125 MICROGram(s) Oral daily  doxazosin 4 milliGRAM(s) Oral <User Schedule>  fenofibrate Tablet 145 milliGRAM(s) Oral daily  furosemide    Tablet 20 milliGRAM(s) Oral daily  glucagon  Injectable 1 milliGRAM(s) IntraMuscular once  heparin   Injectable 5000 Unit(s) SubCutaneous every 8 hours  hydrALAZINE 50 milliGRAM(s) Oral every 8 hours  insulin lispro (ADMELOG) corrective regimen sliding scale   SubCutaneous three times a day before meals  labetalol 100 milliGRAM(s) Oral two times a day  lamoTRIgine 100 milliGRAM(s) Oral daily  mirtazapine 30 milliGRAM(s) Oral at bedtime  oxybutynin 5 milliGRAM(s) Oral every 8 hours  pantoprazole    Tablet 40 milliGRAM(s) Oral before breakfast  piperacillin/tazobactam IVPB. 3.375 Gram(s) IV Intermittent once  piperacillin/tazobactam IVPB.. 3.375 Gram(s) IV Intermittent every 8 hours  venlafaxine XR. 150 milliGRAM(s) Oral daily    MEDICATIONS  (PRN):  acetaminophen   Tablet .. 650 milliGRAM(s) Oral every 6 hours PRN Temp greater or equal to 38C (100.4F), Mild Pain (1 - 3)  melatonin 3 milliGRAM(s) Oral at bedtime PRN Insomnia  ondansetron Injectable 4 milliGRAM(s) IV Push every 8 hours PRN Nausea and/or Vomiting      REVIEW OF SYSTEMS:  CONSTITUTIONAL: No fever, weight loss, chills, shakes, or fat  RESPIRATORY: No cough, wheezing, hemoptysis, or shortness of breath  CARDIOVASCULAR: No chest pain, dyspnea, palpitations, dizziness, syncope, paroxysmal nocturnal dyspnea, orthopnea, or arm or leg swelling  GASTROINTESTINAL: No abdominal  or epigastric pain, nausea, vomiting, hematemesis, diarrhea, constipation, melena or bright red bloo  NEUROLOGICAL: No headaches, memory loss, slurred speech, limb weakness, loss of strength, numbness, or tremors  SKIN: No itching, burning, rashes, or lesions  ENDOCRINE: No heat or cold intolerance, or hair loss  MUSCULOSKELETAL: No joint pain or swelling, muscle, back, or extremity pain  HEME/LYMPH: No easy bruising or bleeding gums  ALLERY AND IMMUNOLOGIC: No hives or rash.    Vital Signs Last 24 Hrs  T(C): 36.7 (06 Oct 2021 21:02), Max: 36.7 (06 Oct 2021 21:02)  T(F): 98.1 (06 Oct 2021 21:02), Max: 98.1 (06 Oct 2021 21:02)  HR: 58 (06 Oct 2021 22:48) (58 - 80)  BP: 189/85 (06 Oct 2021 23:14) (189/85 - 235/116)  BP(mean): --  RR: 20 (06 Oct 2021 22:48) (16 - 20)  SpO2: 98% (06 Oct 2021 22:48) (96% - 98%)    PHYSICAL EXAM:  HEAD:  Atraumatic, Normocephalic  EYES: EOMI, PERRLA, conjunctiva and sclera clear  NECK: Supple and normal thyroid.  No JVD or carotid bruit.   HEART: S1, S2 regular , 1/6 soft ejection systolic murmur in mitral area , no thrill and no gallops .  PULMONARY: Bilateral vesicular breathing , few scattered ronchi and few scattered rales are present .  ABDOMEN: Soft nontender and positive bowl sounds   EXTREMITIES:  No clubbing, cyanosis, or pedal  edema  NEUROLOGICAL: AAOX3 , no focal deficit .    LABS:                        10.1   8.49  )-----------( 302      ( 06 Oct 2021 21:49 )             33.2     10-06    143  |  111<H>  |  36<H>  ----------------------------<  122<H>  3.5   |  27  |  2.30<H>    Ca    9.5      06 Oct 2021 21:49    TPro  7.5  /  Alb  3.2<L>  /  TBili  0.2  /  DBili  x   /  AST  18  /  ALT  25  /  AlkPhos  41  10-    CARDIAC MARKERS ( 06 Oct 2021 21:51 )  .149 ng/mL / x     / x     / x     / x          PT/INR - ( 06 Oct 2021 21:49 )   PT: 13.9 sec;   INR: 1.20 ratio         PTT - ( 06 Oct 2021 21:49 )  PTT:36.3 sec  Urinalysis Basic - ( 06 Oct 2021 21:49 )    Color: Yellow / Appearance: Clear / S.020 / pH: x  Gluc: x / Ketone: Negative  / Bili: Negative / Urobili: Negative   Blood: x / Protein: 500 mg/dL / Nitrite: Negative   Leuk Esterase: Negative / RBC: 3-5 /HPF / WBC 0-2   Sq Epi: x / Non Sq Epi: Occasional / Bacteria: Occasional      BNPSerum Pro-Brain Natriuretic Peptide: 8061 pg/mL (10-06 @ 21:49)        EKG:  ECHO:  IMAGING:    Assessment and Plan :     Will continue to follow during hospital course and give further recommendations as needed . Thanks for your referral . 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 presenting to [facility] today with multiple concerns including subjective fevers, SOB, weakness, and brief episodes of palpitations, and urinary frequency "for at least 3 days". Pt states he would intermittently feel chills and feverish, recorded no temps at group home. Endorses orthostasis. Denies recent sick contacts, recent travel hx. Last 3 days, Pt experienced SOB beyond his baseline (uses symbicort inhaler PRN). Denies SOB association with exertion. States he has been weaker, usually ambulates independently but now feels unable to hold his weight. Denies LOC, recent falls, vision loss, hemiparesis, convulsions, incontinence. Regarding palpitations, pt states for 3 days his heart would skip a beat, return to normal. Self limited episodes under a minute, no associated chest pain. Pt also describes urinary frequency beyond baseline, denies dysuria or incontinence.        (07 Oct 2021 01:24)      HPI:    76y Male for Cardiology Consult    PAST MEDICAL & SURGICAL HISTORY:  HTN (hypertension)  c/b multiple episodes of hypertensive urgency    HLD (hyperlipidemia)    Atrial fibrillation  first documented on EKG 10/7/2021    CAD (coronary artery disease)  s/p stents (not on plavix)    Type 2 diabetes mellitus  not on home insulin    Anxiety  multiple psych medications    History of diverticulitis  2021    Diverticulosis  c/b GIB in     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  Had surgery ( 2013 )    S/P tonsillectomy    S/P arthroscopic knee surgery  Bilateral (  )    Torsion of testicle  Had surgery at age 14    Pilonidal cyst  Had surgery (  )    S/P cataract surgery  Bilateral    H/O hernia repair        FAMILY HISTORY:  FHx: throat cancer    No family history of colorectal cancer        SOCIAL HISTORY:   Alcohol:  Smoking:    Allergies    No Known Allergies    Intolerances        MEDICATIONS  (STANDING):  amLODIPine   Tablet 10 milliGRAM(s) Oral daily  ARIPiprazole 30 milliGRAM(s) Oral daily  aspirin enteric coated 81 milliGRAM(s) Oral daily  azithromycin  IVPB 500 milliGRAM(s) IV Intermittent once  budesonide 160 MICROgram(s)/formoterol 4.5 MICROgram(s) Inhaler 2 Puff(s) Inhalation two times a day  cloNIDine 0.2 milliGRAM(s) Oral three times a day  dextrose 40% Gel 15 Gram(s) Oral once  dextrose 5%. 1000 milliLiter(s) (50 mL/Hr) IV Continuous <Continuous>  dextrose 50% Injectable 25 Gram(s) IV Push once  digoxin     Tablet 125 MICROGram(s) Oral daily  doxazosin 4 milliGRAM(s) Oral <User Schedule>  fenofibrate Tablet 145 milliGRAM(s) Oral daily  furosemide    Tablet 20 milliGRAM(s) Oral daily  glucagon  Injectable 1 milliGRAM(s) IntraMuscular once  heparin   Injectable 5000 Unit(s) SubCutaneous every 8 hours  hydrALAZINE 50 milliGRAM(s) Oral every 8 hours  insulin lispro (ADMELOG) corrective regimen sliding scale   SubCutaneous three times a day before meals  labetalol 100 milliGRAM(s) Oral two times a day  lamoTRIgine 100 milliGRAM(s) Oral daily  mirtazapine 30 milliGRAM(s) Oral at bedtime  oxybutynin 5 milliGRAM(s) Oral every 8 hours  pantoprazole    Tablet 40 milliGRAM(s) Oral before breakfast  piperacillin/tazobactam IVPB. 3.375 Gram(s) IV Intermittent once  piperacillin/tazobactam IVPB.. 3.375 Gram(s) IV Intermittent every 8 hours  venlafaxine XR. 150 milliGRAM(s) Oral daily    MEDICATIONS  (PRN):  acetaminophen   Tablet .. 650 milliGRAM(s) Oral every 6 hours PRN Temp greater or equal to 38C (100.4F), Mild Pain (1 - 3)  melatonin 3 milliGRAM(s) Oral at bedtime PRN Insomnia  ondansetron Injectable 4 milliGRAM(s) IV Push every 8 hours PRN Nausea and/or Vomiting    Vital Signs Last 24 Hrs  T(C): 36.7 (06 Oct 2021 21:02), Max: 36.7 (06 Oct 2021 21:02)  T(F): 98.1 (06 Oct 2021 21:02), Max: 98.1 (06 Oct 2021 21:02)  HR: 58 (06 Oct 2021 22:48) (58 - 80)  BP: 189/85 (06 Oct 2021 23:14) (189/85 - 235/116)  BP(mean): --  RR: 20 (06 Oct 2021 22:48) (16 - 20)  SpO2: 98% (06 Oct 2021 22:48) (96% - 98%)  LABS:                        10.1   8.49  )-----------( 302      ( 06 Oct 2021 21:49 )             33.2     10-    143  |  111<H>  |  36<H>  ----------------------------<  122<H>  3.5   |  27  |  2.30<H>    Ca    9.5      06 Oct 2021 21:49    TPro  7.5  /  Alb  3.2<L>  /  TBili  0.2  /  DBili  x   /  AST  18  /  ALT  25  /  AlkPhos  41  10-    CARDIAC MARKERS ( 06 Oct 2021 21:51 )  .149 ng/mL / x     / x     / x     / x          PT/INR - ( 06 Oct 2021 21:49 )   PT: 13.9 sec;   INR: 1.20 ratio         PTT - ( 06 Oct 2021 21:49 )  PTT:36.3 sec  Urinalysis Basic - ( 06 Oct 2021 21:49 )    Color: Yellow / Appearance: Clear / S.020 / pH: x  Gluc: x / Ketone: Negative  / Bili: Negative / Urobili: Negative   Blood: x / Protein: 500 mg/dL / Nitrite: Negative   Leuk Esterase: Negative / RBC: 3-5 /HPF / WBC 0-2   Sq Epi: x / Non Sq Epi: Occasional / Bacteria: Occasional      BNPSerum Pro-Brain Natriuretic Peptide: 8061 pg/mL (10-06 @ 21:49)      Assessment and Plan :   FULL CONSULT DICTATED .  76 years old male with H/O hypertension , CAD , DM , chronic atrial fibrillation ,  chronic renal insufficiency has cjest pain and has SOB  and has pneumoniae . Mild elevated Troponin I most likely demand ischemia . However will repeat Troponin I levels to see trending of Troponin I levels . Patient has mild chronic  diastolic heart failure and also chronic renal insufficiency . Continue I/V antibiotics . Patient BP elevated so will increase labetalol and hydralazine .  Will continue to follow during hospital course and give further recommendations as needed . Thanks for your referral . if any questions please contact me at office ([phone])

## 2021-10-06 NOTE — CONSULT NOTE ADULT - TIME BILLING
in direct care of patient , reviewing labs and other results and adjusting medications and in discussion with other consultants , RN and PMD .

## 2021-10-06 NOTE — CONSULT NOTE ADULT - SUBJECTIVE AND OBJECTIVE BOX
[facility] 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 Pt states he would intermittently feel chills and feverish, recorded no temps at group home. Endorses orthostasis. Denies recent sick contacts, recent travel hx. Last 3 days, Pt experienced SOB beyond his baseline (uses symbicort inhaler PRN). Denies SOB association with exertion. States he has been weaker, usually ambulates independently but now feels unable to hold his weight. Denies LOC, recent falls, vision loss, hemiparesis, convulsions, incontinence. Regarding palpitations, pt states for 3 days his heart would skip a beat, return to normal. Self limited episodes under a minute, no associated chest pain. Pt also describes urinary frequency beyond baseline, denies dysuria or incontinence.        (07 Oct 2021 01:24)      HPI:    76y Male for Cardiology Consult    PAST MEDICAL & SURGICAL HISTORY:  HTN (hypertension)  c/b multiple episodes of hypertensive urgency    HLD (hyperlipidemia)    Atrial fibrillation  first documented on EKG 10/7/2021    CAD (coronary artery disease)  s/p stents (not on plavix)    Type 2 diabetes mellitus  not on home insulin    Anxiety  multiple psych medications    History of diverticulitis  2021    Diverticulosis  c/b GIB in     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  Had surgery ( 2013 )    S/P tonsillectomy    S/P arthroscopic knee surgery  Bilateral (  )    Torsion of testicle  Had surgery at age 14    Pilonidal cyst  Had surgery (  )    S/P cataract surgery  Bilateral    H/O hernia repair        FAMILY HISTORY:  FHx: throat cancer    No family history of colorectal cancer        SOCIAL HISTORY:   Alcohol:  Smoking:    Allergies    No Known Allergies    Intolerances        MEDICATIONS  (STANDING):  amLODIPine   Tablet 10 milliGRAM(s) Oral daily  apixaban 5 milliGRAM(s) Oral every 12 hours  ARIPiprazole 30 milliGRAM(s) Oral daily  aspirin enteric coated 81 milliGRAM(s) Oral daily  budesonide 160 MICROgram(s)/formoterol 4.5 MICROgram(s) Inhaler 2 Puff(s) Inhalation two times a day  cloNIDine 0.2 milliGRAM(s) Oral three times a day  dextrose 40% Gel 15 Gram(s) Oral once  dextrose 5%. 1000 milliLiter(s) (50 mL/Hr) IV Continuous <Continuous>  dextrose 50% Injectable 25 Gram(s) IV Push once  doxazosin 4 milliGRAM(s) Oral <User Schedule>  famotidine    Tablet 20 milliGRAM(s) Oral two times a day  fenofibrate Tablet 145 milliGRAM(s) Oral daily  furosemide    Tablet 20 milliGRAM(s) Oral two times a day  glucagon  Injectable 1 milliGRAM(s) IntraMuscular once  hydrALAZINE 50 milliGRAM(s) Oral every 8 hours  insulin lispro (ADMELOG) corrective regimen sliding scale   SubCutaneous three times a day before meals  labetalol 100 milliGRAM(s) Oral two times a day  lamoTRIgine 100 milliGRAM(s) Oral daily  mirtazapine 30 milliGRAM(s) Oral at bedtime  oxybutynin 5 milliGRAM(s) Oral four times a day  pantoprazole    Tablet 40 milliGRAM(s) Oral before breakfast  piperacillin/tazobactam IVPB. 3.375 Gram(s) IV Intermittent once  piperacillin/tazobactam IVPB.. 3.375 Gram(s) IV Intermittent every 8 hours  venlafaxine XR. 150 milliGRAM(s) Oral daily    MEDICATIONS  (PRN):  acetaminophen   Tablet .. 650 milliGRAM(s) Oral every 6 hours PRN Temp greater or equal to 38C (100.4F), Mild Pain (1 - 3)  melatonin 3 milliGRAM(s) Oral at bedtime PRN Insomnia  ondansetron Injectable 4 milliGRAM(s) IV Push every 8 hours PRN Nausea and/or Vomiting      REVIEW OF SYSTEMS:  CONSTITUTIONAL: No fever, weight loss, chills, shakes, or fat  RESPIRATORY: No cough, wheezing, hemoptysis, or shortness of breath  CARDIOVASCULAR: No chest pain, dyspnea, palpitations, dizziness, syncope, paroxysmal nocturnal dyspnea, orthopnea, or arm or leg swelling  GASTROINTESTINAL: No abdominal  or epigastric pain, nausea, vomiting, hematemesis, diarrhea, constipation, melena or bright red bloo  NEUROLOGICAL: No headaches, memory loss, slurred speech, limb weakness, loss of strength, numbness, or tremors  SKIN: No itching, burning, rashes, or lesions  ENDOCRINE: No heat or cold intolerance, or hair loss  MUSCULOSKELETAL: No joint pain or swelling, muscle, back, or extremity pain  HEME/LYMPH: No easy bruising or bleeding gums  ALLERY AND IMMUNOLOGIC: No hives or rash.    Vital Signs Last 24 Hrs  T(C): 36.7 (06 Oct 2021 21:02), Max: 36.7 (06 Oct 2021 21:02)  T(F): 98.1 (06 Oct 2021 21:02), Max: 98.1 (06 Oct 2021 21:02)  HR: 58 (06 Oct 2021 22:48) (58 - 80)  BP: 189/85 (06 Oct 2021 23:14) (189/85 - 235/116)  BP(mean): --  RR: 20 (06 Oct 2021 22:48) (16 - 20)  SpO2: 98% (06 Oct 2021 22:48) (96% - 98%)    PHYSICAL EXAM:  HEAD:  Atraumatic, Normocephalic  EYES: EOMI, PERRLA, conjunctiva and sclera clear  NECK: Supple and normal thyroid.  No JVD or carotid bruit.   HEART: S1, S2 regular , 1/6 soft ejection systolic murmur in mitral area , no thrill and no gallops .  PULMONARY: Bilateral vesicular breathing , few scattered ronchi and few scattered rales are present .  ABDOMEN: Soft nontender and positive bowl sounds   EXTREMITIES:  No clubbing, cyanosis, or pedal  edema  NEUROLOGICAL: AAOX3 , no focal deficit .    LABS:                        10.1   8.49  )-----------( 302      ( 06 Oct 2021 21:49 )             33.2     10-06    143  |  111<H>  |  36<H>  ----------------------------<  122<H>  3.5   |  27  |  2.30<H>    Ca    9.5      06 Oct 2021 21:49    TPro  7.5  /  Alb  3.2<L>  /  TBili  0.2  /  DBili  x   /  AST  18  /  ALT  25  /  AlkPhos  41  10-    CARDIAC MARKERS ( 06 Oct 2021 21:51 )  .149 ng/mL / x     / x     / x     / x          PT/INR - ( 06 Oct 2021 21:49 )   PT: 13.9 sec;   INR: 1.20 ratio         PTT - ( 06 Oct 2021 21:49 )  PTT:36.3 sec  Urinalysis Basic - ( 06 Oct 2021 21:49 )    Color: Yellow / Appearance: Clear / S.020 / pH: x  Gluc: x / Ketone: Negative  / Bili: Negative / Urobili: Negative   Blood: x / Protein: 500 mg/dL / Nitrite: Negative   Leuk Esterase: Negative / RBC: 3-5 /HPF / WBC 0-2   Sq Epi: x / Non Sq Epi: Occasional / Bacteria: Occasional      BNPSerum Pro-Brain Natriuretic Peptide: 8061 pg/mL (10-06 @ 21:49)        EKG:  ECHO:  IMAGING:    Assessment and Plan :     Will continue to follow during hospital course and give further recommendations as needed . Thanks for your referral . if any questions please contact me at office ([phone])cell [phone])

## 2021-10-06 NOTE — ED ADULT NURSE NOTE - NSIMPLEMENTINTERV_GEN_ALL_ED
Implemented All Fall Risk Interventions:  [place] to call system. Call bell, personal items and telephone within reach. Instruct patient to call for assistance. Room bathroom lighting operational. Non-slip footwear when patient is off stretcher. Physically safe environment: no spills, clutter or unnecessary equipment. Stretcher in lowest position, wheels locked, appropriate side rails in place. Provide visual cue, wrist band, yellow gown, etc. Monitor gait and stability. Monitor for mental status changes and reorient to person, place, and time. Review medications for side effects contributing to fall risk. Reinforce activity limits and safety measures with patient and family.

## 2021-10-06 NOTE — ED ADULT NURSE NOTE - OBJECTIVE STATEMENT
Pt. received alert and oriented x3 with chief complaint of difficulty breathing w/ body aches and chills. Pt. stats difficulty breathing for past 3 days. Pt. placed on continuous cardiac monitor with continuous pulse ox. EKG performed at bedside upon arrival. Pt. presents w/ bilateral lower leg edema.

## 2021-10-06 NOTE — ED PROVIDER NOTE - PHYSICAL EXAMINATION
Vital signs as available reviewed.  General:  Comfortable, no acute distress.  Head:  Normocephalic, atraumatic.  Eyes:  Conjunctiva pink, no icterus.  Cardiovascular:  Regular rate, no obvious murmur. bilateral pitting edema.  Respiratory:  Clear to auscultation, good air entry bilaterally.  Abdomen:  Soft, + lower abdominal tenderness to palpation.  Musculoskeletal:  No deformity or calf tenderness.  Neurologic: Alert and oriented, moving all extremities.  Skin:  Warm and dry. mild erythema and warmth of left ankle.

## 2021-10-06 NOTE — ED PROVIDER NOTE - OBJECTIVE STATEMENT
77 y/o M with PMHx of Type 2 DM (not on insulin), CAD s/p stents >4 years ago (off plavix), Lupus, HTN, HLD, CKD stage 3, HepC, diverticulosis, history of blood clots in brain (s/p surgery 2013) from [facility] for several days of feeling sick he reports headache, shortness of breath, chest pain, fever, dry cough, decreased appetite. headache is slow onset, he has been taking "5 grains" of aspirin 4 to 5 times per day. he also reports leg swelling and knee pain, he feels he can no longer walk.    PMD: Dr Man

## 2021-10-06 NOTE — ED PROVIDER NOTE - CLINICAL SUMMARY MEDICAL DECISION MAKING FREE TEXT BOX
here with multiple complaints, concern for infection such as PNA, UTI, diverticulosis. May be related to CHF, ACS. patient denies history of afib. given patient's current condition he will require admission.

## 2021-10-06 NOTE — ED PROVIDER NOTE - NS ED ROS FT
Constitutional: +fever.  Neurological: +headache.  Eyes: No vision changes.   Ears, Nose, Mouth, Throat: No congestion.  Cardiovascular: + chest pain.  Respiratory: + difficulty breathing.  Gastrointestinal: No nausea or vomiting.  Genitourinary: + dysuria.  Musculoskeletal: + joint pain.  Integumentary (skin and/or breast): No rash.

## 2021-10-06 NOTE — ED PROVIDER NOTE - PROGRESS NOTE DETAILS
BP improving with IV labetalol and hydralazine. troponin mildly elevated. Dr. Gamboa listed as cardiologist- he will see the patient.

## 2021-10-07 ENCOUNTER — TRANSCRIPTION ENCOUNTER (OUTPATIENT)
Age: 76
End: 2021-10-07

## 2021-10-07 DIAGNOSIS — N40.0 BENIGN PROSTATIC HYPERPLASIA WITHOUT LOWER URINARY TRACT SYMPTOMS: ICD-10-CM

## 2021-10-07 DIAGNOSIS — R77.8 OTHER SPECIFIED ABNORMALITIES OF PLASMA PROTEINS: ICD-10-CM

## 2021-10-07 DIAGNOSIS — N18.30 CHRONIC KIDNEY DISEASE, STAGE 3 UNSPECIFIED: ICD-10-CM

## 2021-10-07 DIAGNOSIS — F32.9 MAJOR DEPRESSIVE DISORDER, SINGLE EPISODE, UNSPECIFIED: ICD-10-CM

## 2021-10-07 DIAGNOSIS — I50.9 HEART FAILURE, UNSPECIFIED: ICD-10-CM

## 2021-10-07 DIAGNOSIS — I16.0 HYPERTENSIVE URGENCY: ICD-10-CM

## 2021-10-07 DIAGNOSIS — Z29.9 ENCOUNTER FOR PROPHYLACTIC MEASURES, UNSPECIFIED: ICD-10-CM

## 2021-10-07 DIAGNOSIS — E11.9 TYPE 2 DIABETES MELLITUS WITHOUT COMPLICATIONS: ICD-10-CM

## 2021-10-07 DIAGNOSIS — I25.10 ATHEROSCLEROTIC HEART DISEASE OF NATIVE CORONARY ARTERY WITHOUT ANGINA PECTORIS: ICD-10-CM

## 2021-10-07 DIAGNOSIS — D63.8 ANEMIA IN OTHER CHRONIC DISEASES CLASSIFIED ELSEWHERE: ICD-10-CM

## 2021-10-07 DIAGNOSIS — R91.8 OTHER NONSPECIFIC ABNORMAL FINDING OF LUNG FIELD: ICD-10-CM

## 2021-10-07 DIAGNOSIS — J18.9 PNEUMONIA, UNSPECIFIED ORGANISM: ICD-10-CM

## 2021-10-07 DIAGNOSIS — I50.32 CHRONIC DIASTOLIC (CONGESTIVE) HEART FAILURE: ICD-10-CM

## 2021-10-07 DIAGNOSIS — I48.20 CHRONIC ATRIAL FIBRILLATION, UNSPECIFIED: ICD-10-CM

## 2021-10-07 LAB
ALBUMIN SERPL ELPH-MCNC: 3.1 G/DL — LOW (ref 3.3–5)
ALP SERPL-CCNC: 39 U/L — LOW (ref 40–120)
ALT FLD-CCNC: 24 U/L — SIGNIFICANT CHANGE UP (ref 12–78)
ANION GAP SERPL CALC-SCNC: 10 MMOL/L — SIGNIFICANT CHANGE UP (ref 5–17)
AST SERPL-CCNC: 19 U/L — SIGNIFICANT CHANGE UP (ref 15–37)
BASOPHILS # BLD AUTO: 0.03 K/UL — SIGNIFICANT CHANGE UP (ref 0–0.2)
BASOPHILS NFR BLD AUTO: 0.4 % — SIGNIFICANT CHANGE UP (ref 0–2)
BILIRUB SERPL-MCNC: 0.3 MG/DL — SIGNIFICANT CHANGE UP (ref 0.2–1.2)
BUN SERPL-MCNC: 32 MG/DL — HIGH (ref 7–23)
CALCIUM SERPL-MCNC: 9.4 MG/DL — SIGNIFICANT CHANGE UP (ref 8.5–10.1)
CHLORIDE SERPL-SCNC: 109 MMOL/L — HIGH (ref 96–108)
CHOLEST SERPL-MCNC: 174 MG/DL — SIGNIFICANT CHANGE UP
CK MB BLD-MCNC: 2.1 % — SIGNIFICANT CHANGE UP (ref 0–3.5)
CK MB CFR SERPL CALC: 2.2 NG/ML — SIGNIFICANT CHANGE UP (ref 0–3.6)
CK SERPL-CCNC: 106 U/L — SIGNIFICANT CHANGE UP (ref 26–308)
CO2 SERPL-SCNC: 25 MMOL/L — SIGNIFICANT CHANGE UP (ref 22–31)
CREAT SERPL-MCNC: 2 MG/DL — HIGH (ref 0.5–1.3)
DIGOXIN SERPL-MCNC: 0.5 NG/ML — LOW (ref 0.8–2)
DIGOXIN SERPL-MCNC: 0.6 NG/ML — LOW (ref 0.8–2)
EOSINOPHIL # BLD AUTO: 0.26 K/UL — SIGNIFICANT CHANGE UP (ref 0–0.5)
EOSINOPHIL NFR BLD AUTO: 3 % — SIGNIFICANT CHANGE UP (ref 0–6)
GLUCOSE SERPL-MCNC: 138 MG/DL — HIGH (ref 70–99)
HCT VFR BLD CALC: 32.2 % — LOW (ref 39–50)
HDLC SERPL-MCNC: 42 MG/DL — SIGNIFICANT CHANGE UP
HGB BLD-MCNC: 9.8 G/DL — LOW (ref 13–17)
IMM GRANULOCYTES NFR BLD AUTO: 0.2 % — SIGNIFICANT CHANGE UP (ref 0–1.5)
LIPID PNL WITH DIRECT LDL SERPL: 100 MG/DL — HIGH
LYMPHOCYTES # BLD AUTO: 1.68 K/UL — SIGNIFICANT CHANGE UP (ref 1–3.3)
LYMPHOCYTES # BLD AUTO: 19.6 % — SIGNIFICANT CHANGE UP (ref 13–44)
MAGNESIUM SERPL-MCNC: 2 MG/DL — SIGNIFICANT CHANGE UP (ref 1.6–2.6)
MCHC RBC-ENTMCNC: 25.9 PG — LOW (ref 27–34)
MCHC RBC-ENTMCNC: 30.4 GM/DL — LOW (ref 32–36)
MCV RBC AUTO: 85 FL — SIGNIFICANT CHANGE UP (ref 80–100)
MONOCYTES # BLD AUTO: 0.61 K/UL — SIGNIFICANT CHANGE UP (ref 0–0.9)
MONOCYTES NFR BLD AUTO: 7.1 % — SIGNIFICANT CHANGE UP (ref 2–14)
NEUTROPHILS # BLD AUTO: 5.97 K/UL — SIGNIFICANT CHANGE UP (ref 1.8–7.4)
NEUTROPHILS NFR BLD AUTO: 69.7 % — SIGNIFICANT CHANGE UP (ref 43–77)
NON HDL CHOLESTEROL: 132 MG/DL — HIGH
NRBC # BLD: 0 /100 WBCS — SIGNIFICANT CHANGE UP (ref 0–0)
PHOSPHATE SERPL-MCNC: 4.2 MG/DL — SIGNIFICANT CHANGE UP (ref 2.5–4.5)
PLATELET # BLD AUTO: 272 K/UL — SIGNIFICANT CHANGE UP (ref 150–400)
POTASSIUM SERPL-MCNC: 3.2 MMOL/L — LOW (ref 3.5–5.3)
POTASSIUM SERPL-SCNC: 3.2 MMOL/L — LOW (ref 3.5–5.3)
PROT SERPL-MCNC: 7.1 G/DL — SIGNIFICANT CHANGE UP (ref 6–8.3)
RAPID RVP RESULT: SIGNIFICANT CHANGE UP
RBC # BLD: 3.79 M/UL — LOW (ref 4.2–5.8)
RBC # FLD: 17.2 % — HIGH (ref 10.3–14.5)
SARS-COV-2 RNA SPEC QL NAA+PROBE: SIGNIFICANT CHANGE UP
SODIUM SERPL-SCNC: 144 MMOL/L — SIGNIFICANT CHANGE UP (ref 135–145)
TRIGL SERPL-MCNC: 160 MG/DL — HIGH
TROPONIN I SERPL-MCNC: 0.11 NG/ML — HIGH (ref 0.01–0.04)
TROPONIN I SERPL-MCNC: 0.14 NG/ML — HIGH (ref 0.01–0.04)
WBC # BLD: 8.57 K/UL — SIGNIFICANT CHANGE UP (ref 3.8–10.5)
WBC # FLD AUTO: 8.57 K/UL — SIGNIFICANT CHANGE UP (ref 3.8–10.5)

## 2021-10-07 PROCEDURE — 70450 CT HEAD/BRAIN W/O DYE: CPT | Mod: 26,MA

## 2021-10-07 PROCEDURE — 99222 1ST HOSP IP/OBS MODERATE 55: CPT

## 2021-10-07 PROCEDURE — 71250 CT THORAX DX C-: CPT | Mod: 26,MA

## 2021-10-07 PROCEDURE — 74176 CT ABD & PELVIS W/O CONTRAST: CPT | Mod: 26,MA

## 2021-10-07 RX ORDER — HYDRALAZINE HCL 50 MG
50 TABLET ORAL EVERY 8 HOURS
Refills: 0 | Status: DISCONTINUED | OUTPATIENT
Start: 2021-10-07 | End: 2021-10-08

## 2021-10-07 RX ORDER — FUROSEMIDE 40 MG
20 TABLET ORAL
Refills: 0 | Status: DISCONTINUED | OUTPATIENT
Start: 2021-10-07 | End: 2021-10-07

## 2021-10-07 RX ORDER — DIGOXIN 250 MCG
1 TABLET ORAL
Qty: 0 | Refills: 0 | DISCHARGE

## 2021-10-07 RX ORDER — ASPIRIN/CALCIUM CARB/MAGNESIUM 324 MG
81 TABLET ORAL DAILY
Refills: 0 | Status: DISCONTINUED | OUTPATIENT
Start: 2021-10-07 | End: 2021-10-16

## 2021-10-07 RX ORDER — SODIUM CHLORIDE 9 MG/ML
1000 INJECTION, SOLUTION INTRAVENOUS
Refills: 0 | Status: DISCONTINUED | OUTPATIENT
Start: 2021-10-07 | End: 2021-10-16

## 2021-10-07 RX ORDER — DIGOXIN 250 MCG
125 TABLET ORAL DAILY
Refills: 0 | Status: DISCONTINUED | OUTPATIENT
Start: 2021-10-07 | End: 2021-10-07

## 2021-10-07 RX ORDER — PANTOPRAZOLE SODIUM 20 MG/1
40 TABLET, DELAYED RELEASE ORAL
Refills: 0 | Status: DISCONTINUED | OUTPATIENT
Start: 2021-10-07 | End: 2021-10-16

## 2021-10-07 RX ORDER — FAMOTIDINE 10 MG/ML
20 INJECTION INTRAVENOUS
Refills: 0 | Status: DISCONTINUED | OUTPATIENT
Start: 2021-10-07 | End: 2021-10-07

## 2021-10-07 RX ORDER — PIPERACILLIN AND TAZOBACTAM 4; .5 G/20ML; G/20ML
3.38 INJECTION, POWDER, LYOPHILIZED, FOR SOLUTION INTRAVENOUS ONCE
Refills: 0 | Status: COMPLETED | OUTPATIENT
Start: 2021-10-07 | End: 2021-10-07

## 2021-10-07 RX ORDER — AMLODIPINE BESYLATE 2.5 MG/1
10 TABLET ORAL DAILY
Refills: 0 | Status: DISCONTINUED | OUTPATIENT
Start: 2021-10-07 | End: 2021-10-16

## 2021-10-07 RX ORDER — NYSTATIN CREAM 100000 [USP'U]/G
1 CREAM TOPICAL
Refills: 0 | Status: DISCONTINUED | OUTPATIENT
Start: 2021-10-07 | End: 2021-10-16

## 2021-10-07 RX ORDER — AZITHROMYCIN 500 MG/1
500 TABLET, FILM COATED ORAL ONCE
Refills: 0 | Status: COMPLETED | OUTPATIENT
Start: 2021-10-07 | End: 2021-10-07

## 2021-10-07 RX ORDER — PIPERACILLIN AND TAZOBACTAM 4; .5 G/20ML; G/20ML
3.38 INJECTION, POWDER, LYOPHILIZED, FOR SOLUTION INTRAVENOUS EVERY 8 HOURS
Refills: 0 | Status: DISCONTINUED | OUTPATIENT
Start: 2021-10-07 | End: 2021-10-09

## 2021-10-07 RX ORDER — OXYBUTYNIN CHLORIDE 5 MG
5 TABLET ORAL
Refills: 0 | Status: DISCONTINUED | OUTPATIENT
Start: 2021-10-07 | End: 2021-10-16

## 2021-10-07 RX ORDER — FUROSEMIDE 40 MG
20 TABLET ORAL DAILY
Refills: 0 | Status: DISCONTINUED | OUTPATIENT
Start: 2021-10-07 | End: 2021-10-07

## 2021-10-07 RX ORDER — ARIPIPRAZOLE 15 MG/1
30 TABLET ORAL DAILY
Refills: 0 | Status: DISCONTINUED | OUTPATIENT
Start: 2021-10-07 | End: 2021-10-16

## 2021-10-07 RX ORDER — AZITHROMYCIN 500 MG/1
500 TABLET, FILM COATED ORAL DAILY
Refills: 0 | Status: COMPLETED | OUTPATIENT
Start: 2021-10-07 | End: 2021-10-09

## 2021-10-07 RX ORDER — INSULIN LISPRO 100/ML
VIAL (ML) SUBCUTANEOUS
Refills: 0 | Status: DISCONTINUED | OUTPATIENT
Start: 2021-10-07 | End: 2021-10-16

## 2021-10-07 RX ORDER — FAMOTIDINE 10 MG/ML
20 INJECTION INTRAVENOUS
Refills: 0 | Status: DISCONTINUED | OUTPATIENT
Start: 2021-10-07 | End: 2021-10-16

## 2021-10-07 RX ORDER — DOXAZOSIN MESYLATE 4 MG
4 TABLET ORAL
Refills: 0 | Status: DISCONTINUED | OUTPATIENT
Start: 2021-10-07 | End: 2021-10-12

## 2021-10-07 RX ORDER — BUDESONIDE AND FORMOTEROL FUMARATE DIHYDRATE 160; 4.5 UG/1; UG/1
2 AEROSOL RESPIRATORY (INHALATION)
Refills: 0 | Status: DISCONTINUED | OUTPATIENT
Start: 2021-10-07 | End: 2021-10-16

## 2021-10-07 RX ORDER — APIXABAN 2.5 MG/1
5 TABLET, FILM COATED ORAL EVERY 12 HOURS
Refills: 0 | Status: DISCONTINUED | OUTPATIENT
Start: 2021-10-07 | End: 2021-10-16

## 2021-10-07 RX ORDER — HYDRALAZINE HCL 50 MG
50 TABLET ORAL EVERY 8 HOURS
Refills: 0 | Status: DISCONTINUED | OUTPATIENT
Start: 2021-10-07 | End: 2021-10-07

## 2021-10-07 RX ORDER — HEPARIN SODIUM 5000 [USP'U]/ML
5000 INJECTION INTRAVENOUS; SUBCUTANEOUS EVERY 8 HOURS
Refills: 0 | Status: DISCONTINUED | OUTPATIENT
Start: 2021-10-07 | End: 2021-10-07

## 2021-10-07 RX ORDER — FUROSEMIDE 40 MG
20 TABLET ORAL
Refills: 0 | Status: DISCONTINUED | OUTPATIENT
Start: 2021-10-07 | End: 2021-10-08

## 2021-10-07 RX ORDER — VENLAFAXINE HCL 75 MG
150 CAPSULE, EXT RELEASE 24 HR ORAL DAILY
Refills: 0 | Status: DISCONTINUED | OUTPATIENT
Start: 2021-10-07 | End: 2021-10-16

## 2021-10-07 RX ORDER — LAMOTRIGINE 25 MG/1
100 TABLET, ORALLY DISINTEGRATING ORAL DAILY
Refills: 0 | Status: DISCONTINUED | OUTPATIENT
Start: 2021-10-07 | End: 2021-10-16

## 2021-10-07 RX ORDER — POTASSIUM CHLORIDE 20 MEQ
40 PACKET (EA) ORAL ONCE
Refills: 0 | Status: COMPLETED | OUTPATIENT
Start: 2021-10-07 | End: 2021-10-07

## 2021-10-07 RX ORDER — ONDANSETRON 8 MG/1
4 TABLET, FILM COATED ORAL EVERY 8 HOURS
Refills: 0 | Status: DISCONTINUED | OUTPATIENT
Start: 2021-10-07 | End: 2021-10-16

## 2021-10-07 RX ORDER — MIRTAZAPINE 45 MG/1
30 TABLET, ORALLY DISINTEGRATING ORAL AT BEDTIME
Refills: 0 | Status: DISCONTINUED | OUTPATIENT
Start: 2021-10-07 | End: 2021-10-16

## 2021-10-07 RX ORDER — DEXTROSE 50 % IN WATER 50 %
25 SYRINGE (ML) INTRAVENOUS ONCE
Refills: 0 | Status: DISCONTINUED | OUTPATIENT
Start: 2021-10-07 | End: 2021-10-16

## 2021-10-07 RX ORDER — LABETALOL HCL 100 MG
100 TABLET ORAL
Refills: 0 | Status: DISCONTINUED | OUTPATIENT
Start: 2021-10-07 | End: 2021-10-08

## 2021-10-07 RX ORDER — FENOFIBRATE,MICRONIZED 130 MG
145 CAPSULE ORAL DAILY
Refills: 0 | Status: DISCONTINUED | OUTPATIENT
Start: 2021-10-07 | End: 2021-10-09

## 2021-10-07 RX ORDER — LANOLIN ALCOHOL/MO/W.PET/CERES
3 CREAM (GRAM) TOPICAL AT BEDTIME
Refills: 0 | Status: DISCONTINUED | OUTPATIENT
Start: 2021-10-07 | End: 2021-10-16

## 2021-10-07 RX ORDER — GLUCAGON INJECTION, SOLUTION 0.5 MG/.1ML
1 INJECTION, SOLUTION SUBCUTANEOUS ONCE
Refills: 0 | Status: DISCONTINUED | OUTPATIENT
Start: 2021-10-07 | End: 2021-10-16

## 2021-10-07 RX ORDER — ACETAMINOPHEN 500 MG
650 TABLET ORAL EVERY 6 HOURS
Refills: 0 | Status: DISCONTINUED | OUTPATIENT
Start: 2021-10-07 | End: 2021-10-16

## 2021-10-07 RX ORDER — OXYBUTYNIN CHLORIDE 5 MG
5 TABLET ORAL EVERY 8 HOURS
Refills: 0 | Status: DISCONTINUED | OUTPATIENT
Start: 2021-10-07 | End: 2021-10-07

## 2021-10-07 RX ORDER — DEXTROSE 50 % IN WATER 50 %
15 SYRINGE (ML) INTRAVENOUS ONCE
Refills: 0 | Status: DISCONTINUED | OUTPATIENT
Start: 2021-10-07 | End: 2021-10-16

## 2021-10-07 RX ADMIN — PIPERACILLIN AND TAZOBACTAM 25 GRAM(S): 4; .5 INJECTION, POWDER, LYOPHILIZED, FOR SOLUTION INTRAVENOUS at 21:32

## 2021-10-07 RX ADMIN — APIXABAN 5 MILLIGRAM(S): 2.5 TABLET, FILM COATED ORAL at 17:46

## 2021-10-07 RX ADMIN — Medication 100 MILLIGRAM(S): at 05:53

## 2021-10-07 RX ADMIN — Medication 50 MILLIGRAM(S): at 21:32

## 2021-10-07 RX ADMIN — Medication 0.2 MILLIGRAM(S): at 13:59

## 2021-10-07 RX ADMIN — LAMOTRIGINE 100 MILLIGRAM(S): 25 TABLET, ORALLY DISINTEGRATING ORAL at 11:49

## 2021-10-07 RX ADMIN — PANTOPRAZOLE SODIUM 40 MILLIGRAM(S): 20 TABLET, DELAYED RELEASE ORAL at 08:20

## 2021-10-07 RX ADMIN — Medication 0.2 MILLIGRAM(S): at 21:33

## 2021-10-07 RX ADMIN — Medication 50 MILLIGRAM(S): at 05:53

## 2021-10-07 RX ADMIN — Medication 145 MILLIGRAM(S): at 11:49

## 2021-10-07 RX ADMIN — Medication 5 MILLIGRAM(S): at 05:54

## 2021-10-07 RX ADMIN — Medication 150 MILLIGRAM(S): at 11:49

## 2021-10-07 RX ADMIN — Medication 40 MILLIEQUIVALENT(S): at 09:45

## 2021-10-07 RX ADMIN — Medication 4 MILLIGRAM(S): at 08:18

## 2021-10-07 RX ADMIN — BUDESONIDE AND FORMOTEROL FUMARATE DIHYDRATE 2 PUFF(S): 160; 4.5 AEROSOL RESPIRATORY (INHALATION) at 17:47

## 2021-10-07 RX ADMIN — Medication 100 MILLIGRAM(S): at 17:46

## 2021-10-07 RX ADMIN — ARIPIPRAZOLE 30 MILLIGRAM(S): 15 TABLET ORAL at 11:49

## 2021-10-07 RX ADMIN — PIPERACILLIN AND TAZOBACTAM 200 GRAM(S): 4; .5 INJECTION, POWDER, LYOPHILIZED, FOR SOLUTION INTRAVENOUS at 05:55

## 2021-10-07 RX ADMIN — Medication 50 MILLIGRAM(S): at 13:59

## 2021-10-07 RX ADMIN — Medication 650 MILLIGRAM(S): at 14:07

## 2021-10-07 RX ADMIN — PIPERACILLIN AND TAZOBACTAM 25 GRAM(S): 4; .5 INJECTION, POWDER, LYOPHILIZED, FOR SOLUTION INTRAVENOUS at 13:36

## 2021-10-07 RX ADMIN — Medication 81 MILLIGRAM(S): at 11:49

## 2021-10-07 RX ADMIN — AZITHROMYCIN 255 MILLIGRAM(S): 500 TABLET, FILM COATED ORAL at 03:51

## 2021-10-07 RX ADMIN — ONDANSETRON 4 MILLIGRAM(S): 8 TABLET, FILM COATED ORAL at 11:49

## 2021-10-07 RX ADMIN — Medication 20 MILLIGRAM(S): at 17:46

## 2021-10-07 RX ADMIN — Medication 5 MILLIGRAM(S): at 11:49

## 2021-10-07 RX ADMIN — Medication 0.2 MILLIGRAM(S): at 05:55

## 2021-10-07 RX ADMIN — Medication 4 MILLIGRAM(S): at 20:17

## 2021-10-07 RX ADMIN — Medication 20 MILLIGRAM(S): at 05:53

## 2021-10-07 RX ADMIN — APIXABAN 5 MILLIGRAM(S): 2.5 TABLET, FILM COATED ORAL at 05:52

## 2021-10-07 RX ADMIN — BUDESONIDE AND FORMOTEROL FUMARATE DIHYDRATE 2 PUFF(S): 160; 4.5 AEROSOL RESPIRATORY (INHALATION) at 08:22

## 2021-10-07 RX ADMIN — NYSTATIN CREAM 1 APPLICATION(S): 100000 CREAM TOPICAL at 17:46

## 2021-10-07 RX ADMIN — MIRTAZAPINE 30 MILLIGRAM(S): 45 TABLET, ORALLY DISINTEGRATING ORAL at 21:33

## 2021-10-07 RX ADMIN — Medication 5 MILLIGRAM(S): at 17:46

## 2021-10-07 RX ADMIN — AMLODIPINE BESYLATE 10 MILLIGRAM(S): 2.5 TABLET ORAL at 05:55

## 2021-10-07 NOTE — PROGRESS NOTE ADULT - SUBJECTIVE AND OBJECTIVE BOX
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 Pt states he would intermittently feel chills and feverish, recorded no temps at group home since last couple of days, he states progressively worsening of dyspnea on exertion and rest, patient unable to walk short distances or climb stairs due to dyspnea, however denies orthopnea, cough, sputum production, night sweats. Patient mentioned some dizziness associated with lower extremities weakness, usually ambulates independently but now feels unable to hold his weight. Regarding palpitations, pt states for 3 days his heart would skip a beat, return to normal. Self limited episodes under a minute, no associated chest pain. Pt also describes urinary frequency beyond baseline, denies dysuria or incontinence, hematuria, constipation.     ED course:   VS: Afebrile, HR: 80 BP: 220/100->235/116-> 189/85, Spo2: 98 on NC RR: 20  Labs significant for low stable hemoglobin, BUN/creatinine: 36/2.30, Trop 0.149, Pro BMP 8061.   EKG on admission showed Afib @ 64 bpm, incompleta right BBB, LVH  CT head shows no  evidence of acute intracranial hemorrhage, midline shift or CT evidence of acute territorial infarct.  CT chest A/P demonstrated Multilobar pneumonia. Mild cardiomegaly. No mall bowel obstruction or active bowel inflammation.  Patient was given in the ED 10 mg IVP hydralazine 10 mg Labetalol IVP 1x, 2000 cc bolus NS 1x       INTERVAL HPI:  10/07/21: Pt seen and examined at bedside; in no acute distress; complains of intermittent palpitation and transient chest pain. On Zosyn 3.375gm q8h + azithromycin 500mg daily       OVERNIGHT EVENTS: None     Home Medications:  cloNIDine 0.1 mg oral tablet: 2 tab(s) orally 3 times a day (07 Oct 2021 03:41)  digoxin 125 mcg (0.125 mg) oral tablet: 1 tab(s) orally once a day (07 Oct 2021 03:41)  Eliquis 5 mg oral tablet: 1 tab(s) orally 2 times a day (07 Oct 2021 03:41)  famotidine 40 mg oral tablet: 1 tab(s) orally once a day (at bedtime) (07 Oct 2021 03:41)  fenofibrate 145 mg oral tablet: 1 tab(s) orally once a day (07 Oct 2021 03:41)  labetalol 100 mg oral tablet: 1 tab(s) orally 2 times a day (07 Oct 2021 03:41)  Lasix 20 mg oral tablet: 1 tab(s) orally 2 times a day (07 Oct 2021 03:41)  oxybutynin 5 mg oral tablet: 1 tab(s) orally 4 times a day (07 Oct 2021 03:41)  pantoprazole 40 mg oral delayed release tablet: 1 tab(s) orally once a day (before a meal) (07 Oct 2021 03:41)  Symbicort 160 mcg-4.5 mcg/inh inhalation aerosol: 2 puff(s) inhaled 2 times a day (07 Oct 2021 03:41)      MEDICATIONS  (STANDING):  amLODIPine   Tablet 10 milliGRAM(s) Oral daily  apixaban 5 milliGRAM(s) Oral every 12 hours  ARIPiprazole 30 milliGRAM(s) Oral daily  aspirin enteric coated 81 milliGRAM(s) Oral daily  azithromycin   Tablet 500 milliGRAM(s) Oral daily  budesonide 160 MICROgram(s)/formoterol 4.5 MICROgram(s) Inhaler 2 Puff(s) Inhalation two times a day  cloNIDine 0.2 milliGRAM(s) Oral three times a day  dextrose 40% Gel 15 Gram(s) Oral once  dextrose 5%. 1000 milliLiter(s) (50 mL/Hr) IV Continuous <Continuous>  dextrose 50% Injectable 25 Gram(s) IV Push once  doxazosin 4 milliGRAM(s) Oral <User Schedule>  famotidine    Tablet 20 milliGRAM(s) Oral every 48 hours  fenofibrate Tablet 145 milliGRAM(s) Oral daily  furosemide    Tablet 20 milliGRAM(s) Oral two times a day  glucagon  Injectable 1 milliGRAM(s) IntraMuscular once  hydrALAZINE 50 milliGRAM(s) Oral every 8 hours  insulin lispro (ADMELOG) corrective regimen sliding scale   SubCutaneous three times a day before meals  labetalol 100 milliGRAM(s) Oral two times a day  lamoTRIgine 100 milliGRAM(s) Oral daily  mirtazapine 30 milliGRAM(s) Oral at bedtime  nystatin Powder 1 Application(s) Topical two times a day  oxybutynin 5 milliGRAM(s) Oral four times a day  pantoprazole    Tablet 40 milliGRAM(s) Oral before breakfast  piperacillin/tazobactam IVPB.. 3.375 Gram(s) IV Intermittent every 8 hours  venlafaxine XR. 150 milliGRAM(s) Oral daily    MEDICATIONS  (PRN):  acetaminophen   Tablet .. 650 milliGRAM(s) Oral every 6 hours PRN Temp greater or equal to 38C (100.4F), Mild Pain (1 - 3)  melatonin 3 milliGRAM(s) Oral at bedtime PRN Insomnia  ondansetron Injectable 4 milliGRAM(s) IV Push every 8 hours PRN Nausea and/or Vomiting      No Known Allergies      Social History:  Tobacco: quit in , not active smoker  EtOH: denies   Recreational drug use: cocaine several years ago "a few times" has not used in "many years"  Lives with: [facility]; group home; no nursing assistance; observation is there  Ambulates: independent, denies walker or cane but uses guard railings  ADLs: independent, but states need for assistance in bathing, cooking; independent with feeding, ambulating  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[place]  Vaccinations: Moderna x2 doses last dose in May (07 Oct 2021 01:24)      REVIEW OF SYSTEMS:  CONSTITUTIONAL: No fever, No chills, No fatigue, No myalgia, No Body ache, No Weakness  EYES: No eye pain,  No visual disturbances, No discharge, No Redness  ENMT: No ear pain, No nose bleed, No vertigo; No sinus pain, No throat pain, No Congestion  NECK: No pain, No stiffness  RESPIRATORY: No cough, No wheezing, No hemoptysis, No shortness of breath  CARDIOVASCULAR: + chest pain, No palpitations  GASTROINTESTINAL: No abdominal pain, No epigastric pain. No nausea, No vomiting, No diarrhea, No constipation; [  ] BM  GENITOURINARY: No dysuria, No frequency, No urgency, No hematuria, No incontinence  NEUROLOGICAL: No headaches, No dizziness, No numbness, No tingling, No tremors, No weakness  EXTREMITIES: No Swelling, No Pain, No Edema  MUSCULOSKELETAL: No joint pain, No joint swelling; No muscle pain, No back pain, No extremity pain  PSYCHIATRIC: No depression, No anxiety, No mood swings, No difficulty sleeping at night       Vital Signs Last 24 Hrs  T(C): 37.1 (07 Oct 2021 09:39), Max: 37.1 (07 Oct 2021 09:39)  T(F): 98.8 (07 Oct 2021 09:39), Max: 98.8 (07 Oct 2021 09:39)  HR: 71 (07 Oct 2021 09:39) (45 - 80)  BP: 160/70 (07 Oct 2021 09:39) (158/71 - 235/116)  BP(mean): --  RR: 18 (07 Oct 2021 09:39) (16 - 20)  SpO2: 96% (07 Oct 2021 09:39) (96% - 100%)    CAPILLARY BLOOD GLUCOSE      POCT Blood Glucose.: 146 mg/dL (07 Oct 2021 11:31)  POCT Blood Glucose.: 152 mg/dL (07 Oct 2021 07:50)      I&O's Summary    PHYSICAL EXAM:  GENERAL:  [ x ] NAD, [ x ] Well appearing, [  ] Agitated, [  ] Drowsy, [  ] Lethargy, [  ] Confused   HEAD:  [ x] Normal, [  ] Other  EYES:  [x ] EOMI, [  ] PERRLA, [  ] Conjunctiva and sclera clear normal, [  ] Other, [  ] Pallor, [  ] Discharge  ENMT:  [ x ] Normal, [ x ] Moist mucous membranes, [  ] Good dentition, [  ] No thrush  NECK:  [ x ] Supple, [ x ] No JVD, [  ] Normal thyroid, [  ] Lymphadenopathy, [  ] Other  CHEST/LUNG:  [ x ] Clear to auscultation bilaterally, [ x ] Breath Sounds B/L decreased, [  ] Poor effort, [  ] No rales, [ x ] No rhonchi, [ x ] No wheezing  HEART:  [ x ] Regular rate and rhythm, [  ] Tachycardia, [  ] Bradycardia, [  ] Irregular, [  ] No murmurs, No rubs, No gallops, [  ] PPM in place (Mfr:  )  ABDOMEN:  [ x ] Soft, [ x ] Nontender, [ x ] Nondistended, [  ] No mass, [  ] Bowel sounds present, [  ] Obese  NERVOUS SYSTEM:  [ x ] Alert & Oriented x3, [  ] Nonfocal, [  ] Confusion, [  ] Encephalopathic, [  ] Sedated, [  ] Unable to assess, [  ] Dementia, [  ] Other-  EXTREMITIES:  [ x ] 2+ Peripheral Pulses, No clubbing, No cyanosis,  [  ] Edema B/L lower EXT, [  ] PVD stasis skin changes B/L lower EXT, [  ] Wound  LYMPH:  No lymphadenopathy noted      DIET: Diet, Consistent Carbohydrate Renal w/Evening Snack:   DASH/TLC Sodium & Cholesterol Restricted (10-07-21 @ 02:46)      LABS:                        9.8    8.57  )-----------( 272      ( 07 Oct 2021 04:23 )             32.2     07 Oct 2021 04:23    144    |  109    |  32     ----------------------------<  138    3.2     |  25     |  2.00     Ca    9.4        07 Oct 2021 04:23  Phos  4.2       07 Oct 2021 04:23  Mg     2.0       07 Oct 2021 04:23    TPro  7.1    /  Alb  3.1    /  TBili  0.3    /  DBili  x      /  AST  19     /  ALT  24     /  AlkPhos  39     07 Oct 2021 04:23    PT/INR - ( 06 Oct 2021 21:49 )   PT: 13.9 sec;   INR: 1.20 ratio         PTT - ( 06 Oct 2021 21:49 )  PTT:36.3 sec  Urinalysis Basic - ( 06 Oct 2021 21:49 )    Color: Yellow / Appearance: Clear / S.020 / pH: x  Gluc: x / Ketone: Negative  / Bili: Negative / Urobili: Negative   Blood: x / Protein: 500 mg/dL / Nitrite: Negative   Leuk Esterase: Negative / RBC: 3-5 /HPF / WBC 0-2   Sq Epi: x / Non Sq Epi: Occasional / Bacteria: Occasional          CARDIAC MARKERS ( 07 Oct 2021 04:23 )  .145 ng/mL / x     / 106 U/L / x     / 2.2 ng/mL  CARDIAC MARKERS ( 06 Oct 2021 21:51 )  .149 ng/mL / x     / x     / x     / x             Anemia Panel:      Thyroid Panel:  Thyroid Stimulating Hormone, Serum: 1.35 uIU/mL (10-07-21 @ 04:23)            Serum Pro-Brain Natriuretic Peptide: 7438 pg/mL (10-07-21 @ 04:23)  Serum Pro-Brain Natriuretic Peptide: 8061 pg/mL (10-06-21 @ 21:49)      RADIOLOGY & ADDITIONAL TESTS:      HEALTH ISSUES - PROBLEM Dx:        Consultant(s) Notes Reviewed:  [ x ] YES     Care Discussed with [ x ] Consultants, [ x ] Patient, [  ] Family, [  ] HCP, [  ] , [  ] Social Service, [  ] RN, [  ] Physical Therapy, [  ] Palliative Care Team  DVT PPX: [  ] Lovenox, [  ] SC Heparin, [  ] Coumadin, [  ] Xarelto, [  ] Eliquis, [  ] Pradaxa, [  ] IV Heparin drip, [  ] SCD, [  ] Ambulation, [  ] Contraindicated 2/2 GI Bleed, [  ] Contraindicated 2/2  Bleed, [  ] Contraindicated 2/2 Brain Bleed  Advanced Directive: [ x ] None, [  ] DNR/DNI Patient is a 76y old  Male who presents with a chief complaint of multilobar PNA, afib (07 Oct 2021 09:58)    History of Present Illness:   75 yo M with PMHx of Type 2 DM (not on insulin), BPH, CAD s/p stents >4 years ago (not on plavix), diverticulitis (hospitalized 2020 at [facility]), GIB 2/2 diverticulosis (), anxiety, Lupus, HTN, HLD, CKD stage 3, HepC, 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 Pt states he would intermittently feel chills and feverish, recorded no temps at group home since last couple of days, he states progressively worsening of dyspnea on exertion and rest, patient unable to walk short distances or climb stairs due to dyspnea, however denies orthopnea, cough, sputum production, night sweats. Patient mentioned some dizziness associated with lower extremities weakness, usually ambulates independently but now feels unable to hold his weight. Regarding palpitations, pt states for 3 days his heart would skip a beat, return to normal. Self limited episodes under a minute, no associated chest pain. Pt also describes urinary frequency beyond baseline, denies dysuria or incontinence, hematuria, constipation.     ED course:   VS: Afebrile, HR: 80 BP: 220/100->235/116-> 189/85, Spo2: 98 on NC RR: 20  Labs significant for low stable hemoglobin, BUN/creatinine: 36/2.30, Trop 0.149, Pro BMP 8061.   EKG on admission showed Afib @ 64 bpm, incompleta right BBB, LVH  CT head shows no  evidence of acute intracranial hemorrhage, midline shift or CT evidence of acute territorial infarct.  CT chest A/P demonstrated Multilobar pneumonia. Mild cardiomegaly. No mall bowel obstruction or active bowel inflammation.  Patient was given in the ED 10 mg IVP hydralazine 10 mg Labetalol IVP 1x, 2000 cc bolus NS 1x       INTERVAL HPI:  10/07/21: Pt seen and examined at bedside; in no acute distress; complains of intermittent palpitation and transient chest pain. On Zosyn 3.375gm q8h + azithromycin 500mg daily; concern for medication compliance; ??capacity. will consult psych       OVERNIGHT EVENTS: None     Home Medications:  cloNIDine 0.1 mg oral tablet: 2 tab(s) orally 3 times a day (07 Oct 2021 03:41)  digoxin 125 mcg (0.125 mg) oral tablet: 1 tab(s) orally once a day (07 Oct 2021 03:41)  Eliquis 5 mg oral tablet: 1 tab(s) orally 2 times a day (07 Oct 2021 03:41)  famotidine 40 mg oral tablet: 1 tab(s) orally once a day (at bedtime) (07 Oct 2021 03:41)  fenofibrate 145 mg oral tablet: 1 tab(s) orally once a day (07 Oct 2021 03:41)  labetalol 100 mg oral tablet: 1 tab(s) orally 2 times a day (07 Oct 2021 03:41)  Lasix 20 mg oral tablet: 1 tab(s) orally 2 times a day (07 Oct 2021 03:41)  oxybutynin 5 mg oral tablet: 1 tab(s) orally 4 times a day (07 Oct 2021 03:41)  pantoprazole 40 mg oral delayed release tablet: 1 tab(s) orally once a day (before a meal) (07 Oct 2021 03:41)  Symbicort 160 mcg-4.5 mcg/inh inhalation aerosol: 2 puff(s) inhaled 2 times a day (07 Oct 2021 03:41)      MEDICATIONS  (STANDING):  amLODIPine   Tablet 10 milliGRAM(s) Oral daily  apixaban 5 milliGRAM(s) Oral every 12 hours  ARIPiprazole 30 milliGRAM(s) Oral daily  aspirin enteric coated 81 milliGRAM(s) Oral daily  azithromycin   Tablet 500 milliGRAM(s) Oral daily  budesonide 160 MICROgram(s)/formoterol 4.5 MICROgram(s) Inhaler 2 Puff(s) Inhalation two times a day  cloNIDine 0.2 milliGRAM(s) Oral three times a day  dextrose 40% Gel 15 Gram(s) Oral once  dextrose 5%. 1000 milliLiter(s) (50 mL/Hr) IV Continuous <Continuous>  dextrose 50% Injectable 25 Gram(s) IV Push once  doxazosin 4 milliGRAM(s) Oral <User Schedule>  famotidine    Tablet 20 milliGRAM(s) Oral every 48 hours  fenofibrate Tablet 145 milliGRAM(s) Oral daily  furosemide    Tablet 20 milliGRAM(s) Oral two times a day  glucagon  Injectable 1 milliGRAM(s) IntraMuscular once  hydrALAZINE 50 milliGRAM(s) Oral every 8 hours  insulin lispro (ADMELOG) corrective regimen sliding scale   SubCutaneous three times a day before meals  labetalol 100 milliGRAM(s) Oral two times a day  lamoTRIgine 100 milliGRAM(s) Oral daily  mirtazapine 30 milliGRAM(s) Oral at bedtime  nystatin Powder 1 Application(s) Topical two times a day  oxybutynin 5 milliGRAM(s) Oral four times a day  pantoprazole    Tablet 40 milliGRAM(s) Oral before breakfast  piperacillin/tazobactam IVPB.. 3.375 Gram(s) IV Intermittent every 8 hours  venlafaxine XR. 150 milliGRAM(s) Oral daily    MEDICATIONS  (PRN):  acetaminophen   Tablet .. 650 milliGRAM(s) Oral every 6 hours PRN Temp greater or equal to 38C (100.4F), Mild Pain (1 - 3)  melatonin 3 milliGRAM(s) Oral at bedtime PRN Insomnia  ondansetron Injectable 4 milliGRAM(s) IV Push every 8 hours PRN Nausea and/or Vomiting      No Known Allergies      Social History:  Tobacco: quit in , not active smoker  EtOH: denies   Recreational drug use: cocaine several years ago "a few times" has not used in "many years"  Lives with: [facility]; group home; no nursing assistance; observation is there  Ambulates: independent, denies walker or cane but uses guard railings  ADLs: independent, but states need for assistance in bathing, cooking; independent with feeding, ambulating  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 No nausea, No vomiting, No diarrhea, No constipation; [  ] BM  GENITOURINARY: No dysuria, No frequency, No urgency, No hematuria, No incontinence  NEUROLOGICAL: No headaches, No dizziness, No numbness, No tingling, No tremors, No weakness  EXTREMITIES: No Swelling, No Pain, No Edema  MUSCULOSKELETAL: No joint pain, No joint swelling; No muscle pain, No back pain, No extremity pain  PSYCHIATRIC: No depression, No anxiety, No mood swings, No difficulty sleeping at night       Vital Signs Last 24 Hrs  T(C): 37.1 (07 Oct 2021 09:39), Max: 37.1 (07 Oct 2021 09:39)  T(F): 98.8 (07 Oct 2021 09:39), Max: 98.8 (07 Oct 2021 09:39)  HR: 71 (07 Oct 2021 09:39) (45 - 80)  BP: 160/70 (07 Oct 2021 09:39) (158/71 - 235/116)  BP(mean): --  RR: 18 (07 Oct 2021 09:39) (16 - 20)  SpO2: 96% (07 Oct 2021 09:39) (96% - 100%)    CAPILLARY BLOOD GLUCOSE      POCT Blood Glucose.: 146 mg/dL (07 Oct 2021 11:31)  POCT Blood Glucose.: 152 mg/dL (07 Oct 2021 07:50)      I&O's Summary    PHYSICAL EXAM:  GENERAL:  [ x ] NAD, [ x ] Well appearing, [  ] Agitated, [  ] Drowsy, [  ] Lethargy, [  ] Confused   HEAD:  [ x] Normal, [  ] Other  EYES:  [x ] EOMI, [  ] PERRLA, [  ] Conjunctiva and sclera clear normal, [  ] Other, [  ] Pallor, [  ] Discharge  ENMT:  [ x ] Normal, [ x ] Moist mucous membranes, [  ] Good dentition, [  ] No thrush  NECK:  [ x ] Supple, [ x ] No JVD, [  ] Normal thyroid, [  ] Lymphadenopathy, [  ] Other  CHEST/LUNG:  [ x ] Clear to auscultation bilaterally, [ x ] Breath Sounds B/L decreased, [  ] Poor effort, [  ] No rales, [ x ] No rhonchi, [ x ] No wheezing  HEART:  [ x ] Regular rate and rhythm, [  ] Tachycardia, [  ] Bradycardia, [  ] Irregular, [  ] No murmurs, No rubs, No gallops, [  ] PPM in place (Mfr:  )  ABDOMEN:  [ x ] Soft, [ x ] Nontender, [ x ] Nondistended, [  ] No mass, [  ] Bowel sounds present, [  ] Obese  NERVOUS SYSTEM:  [ x ] Alert & Oriented x3, [  ] Nonfocal, [  ] Confusion, [  ] Encephalopathic, [  ] Sedated, [  ] Unable to assess, [  ] Dementia, [  ] Other-  EXTREMITIES:  [ x ] 2+ Peripheral Pulses, No clubbing, No cyanosis,  [  ] Edema B/L lower EXT, [  ] PVD stasis skin changes B/L lower EXT, [  ] Wound  LYMPH:  No lymphadenopathy noted      DIET: Diet, Consistent Carbohydrate Renal w/Evening Snack:   DASH/TLC Sodium & Cholesterol Restricted (10-07-21 @ 02:46)      LABS:                        9.8    8.57  )-----------( 272      ( 07 Oct 2021 04:23 )             32.2     07 Oct 2021 04:23    144    |  109    |  32     ----------------------------<  138    3.2     |  25     |  2.00     Ca    9.4        07 Oct 2021 04:23  Phos  4.2       07 Oct 2021 04:23  Mg     2.0       07 Oct 2021 04:23    TPro  7.1    /  Alb  3.1    /  TBili  0.3    /  DBili  x      /  AST  19     /  ALT  24     /  AlkPhos  39     07 Oct 2021 04:23    PT/INR - ( 06 Oct 2021 21:49 )   PT: 13.9 sec;   INR: 1.20 ratio         PTT - ( 06 Oct 2021 21:49 )  PTT:36.3 sec  Urinalysis Basic - ( 06 Oct 2021 21:49 )    Color: Yellow / Appearance: Clear / S.020 / pH: x  Gluc: x / Ketone: Negative  / Bili: Negative / Urobili: Negative   Blood: x / Protein: 500 mg/dL / Nitrite: Negative   Leuk Esterase: Negative / RBC: 3-5 /HPF / WBC 0-2   Sq Epi: x / Non Sq Epi: Occasional / Bacteria: Occasional          CARDIAC MARKERS ( 07 Oct 2021 04:23 )  .145 ng/mL / x     / 106 U/L / x     / 2.2 ng/mL  CARDIAC MARKERS ( 06 Oct 2021 21:51 )  .149 ng/mL / x     / x     / x     / x             Anemia Panel:      Thyroid Panel:  Thyroid Stimulating Hormone, Serum: 1.35 uIU/mL (10-07-21 @ 04:23)            Serum Pro-Brain Natriuretic Peptide: 7438 pg/mL (10-07-21 @ 04:23)  Serum Pro-Brain Natriuretic Peptide: 8061 pg/mL (10-06-21 @ 21:49)      RADIOLOGY & ADDITIONAL TESTS:      HEALTH ISSUES - PROBLEM Dx:        Consultant(s) Notes Reviewed:  [ x ] YES     Care Discussed with [ x ] Consultants, [ x ] Patient, [  ] Family, [  ] HCP, [  ] , [  ] Social Service, [  ] RN, [  ] Physical Therapy, [  ] Palliative Care Team  DVT PPX: [  ] Lovenox, [  ] SC Heparin, [  ] Coumadin, [  ] Xarelto, [  ] Eliquis, [  ] Pradaxa, [  ] IV Heparin drip, [  ] SCD, [  ] Ambulation, [  ] Contraindicated 2/2 GI Bleed, [  ] Contraindicated 2/2  Bleed, [  ] Contraindicated 2/2 Brain Bleed  Advanced Directive: [ x ] None, [  ] DNR/DNI Patient is a 76y old  Male who presents with a chief complaint of multilobar PNA, afib (07 Oct 2021 09:58)    History of Present Illness:   77 yo M with PMHx of Type 2 DM (not on insulin), BPH, CAD s/p stents >4 years ago (not on plavix), diverticulitis (hospitalized 2020 at [facility]), GIB 2/2 diverticulosis (), anxiety, Lupus, HTN, HLD, CKD stage 3, HepC, 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 Pt states he would intermittently feel chills and feverish, recorded no temps at group home since last couple of days, he states progressively worsening of dyspnea on exertion and rest, patient unable to walk short distances or climb stairs due to dyspnea, however denies orthopnea, cough, sputum production, night sweats. Patient mentioned some dizziness associated with lower extremities weakness, usually ambulates independently but now feels unable to hold his weight. Regarding palpitations, pt states for 3 days his heart would skip a beat, return to normal. Self limited episodes under a minute, no associated chest pain. Pt also describes urinary frequency beyond baseline, denies dysuria or incontinence, hematuria, constipation.     ED course:   VS: Afebrile, HR: 80 BP: 220/100->235/116-> 189/85, Spo2: 98 on NC RR: 20  Labs significant for low stable hemoglobin, BUN/creatinine: 36/2.30, Trop 0.149, Pro BMP 8061.   EKG on admission showed Afib @ 64 bpm, incompleta right BBB, LVH  CT head shows no  evidence of acute intracranial hemorrhage, midline shift or CT evidence of acute territorial infarct.  CT chest A/P demonstrated Multilobar pneumonia. Mild cardiomegaly. No mall bowel obstruction or active bowel inflammation.  Patient was given in the ED 10 mg IVP hydralazine 10 mg Labetalol IVP 1x, 2000 cc bolus NS 1x       INTERVAL HPI:  10/07/21: Pt seen and examined at bedside; in no acute distress; complains of intermittent palpitation and transient chest pain. On Zosyn 3.375gm q8h + azithromycin 500mg daily; concern for medication compliance; ??capacity. will consult psych. K replaced      OVERNIGHT EVENTS: None     Home Medications:  cloNIDine 0.1 mg oral tablet: 2 tab(s) orally 3 times a day (07 Oct 2021 03:41)  digoxin 125 mcg (0.125 mg) oral tablet: 1 tab(s) orally once a day (07 Oct 2021 03:41)  Eliquis 5 mg oral tablet: 1 tab(s) orally 2 times a day (07 Oct 2021 03:41)  famotidine 40 mg oral tablet: 1 tab(s) orally once a day (at bedtime) (07 Oct 2021 03:41)  fenofibrate 145 mg oral tablet: 1 tab(s) orally once a day (07 Oct 2021 03:41)  labetalol 100 mg oral tablet: 1 tab(s) orally 2 times a day (07 Oct 2021 03:41)  Lasix 20 mg oral tablet: 1 tab(s) orally 2 times a day (07 Oct 2021 03:41)  oxybutynin 5 mg oral tablet: 1 tab(s) orally 4 times a day (07 Oct 2021 03:41)  pantoprazole 40 mg oral delayed release tablet: 1 tab(s) orally once a day (before a meal) (07 Oct 2021 03:41)  Symbicort 160 mcg-4.5 mcg/inh inhalation aerosol: 2 puff(s) inhaled 2 times a day (07 Oct 2021 03:41)      MEDICATIONS  (STANDING):  amLODIPine   Tablet 10 milliGRAM(s) Oral daily  apixaban 5 milliGRAM(s) Oral every 12 hours  ARIPiprazole 30 milliGRAM(s) Oral daily  aspirin enteric coated 81 milliGRAM(s) Oral daily  azithromycin   Tablet 500 milliGRAM(s) Oral daily  budesonide 160 MICROgram(s)/formoterol 4.5 MICROgram(s) Inhaler 2 Puff(s) Inhalation two times a day  cloNIDine 0.2 milliGRAM(s) Oral three times a day  dextrose 40% Gel 15 Gram(s) Oral once  dextrose 5%. 1000 milliLiter(s) (50 mL/Hr) IV Continuous <Continuous>  dextrose 50% Injectable 25 Gram(s) IV Push once  doxazosin 4 milliGRAM(s) Oral <User Schedule>  famotidine    Tablet 20 milliGRAM(s) Oral every 48 hours  fenofibrate Tablet 145 milliGRAM(s) Oral daily  furosemide    Tablet 20 milliGRAM(s) Oral two times a day  glucagon  Injectable 1 milliGRAM(s) IntraMuscular once  hydrALAZINE 50 milliGRAM(s) Oral every 8 hours  insulin lispro (ADMELOG) corrective regimen sliding scale   SubCutaneous three times a day before meals  labetalol 100 milliGRAM(s) Oral two times a day  lamoTRIgine 100 milliGRAM(s) Oral daily  mirtazapine 30 milliGRAM(s) Oral at bedtime  nystatin Powder 1 Application(s) Topical two times a day  oxybutynin 5 milliGRAM(s) Oral four times a day  pantoprazole    Tablet 40 milliGRAM(s) Oral before breakfast  piperacillin/tazobactam IVPB.. 3.375 Gram(s) IV Intermittent every 8 hours  venlafaxine XR. 150 milliGRAM(s) Oral daily    MEDICATIONS  (PRN):  acetaminophen   Tablet .. 650 milliGRAM(s) Oral every 6 hours PRN Temp greater or equal to 38C (100.4F), Mild Pain (1 - 3)  melatonin 3 milliGRAM(s) Oral at bedtime PRN Insomnia  ondansetron Injectable 4 milliGRAM(s) IV Push every 8 hours PRN Nausea and/or Vomiting      No Known Allergies      Social History:  Tobacco: quit in , not active smoker  EtOH: denies   Recreational drug use: cocaine several years ago "a few times" has not used in "many years"  Lives with: [facility]; group home; no nursing assistance; observation is there  Ambulates: independent, denies walker or cane but uses guard railings  ADLs: independent, but states need for assistance in bathing, cooking; independent with feeding, ambulating  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[place]  Vaccinations: Moderna x2 doses last dose in May (07 Oct 2021 01:24)      REVIEW OF SYSTEMS: i am ok  CONSTITUTIONAL: No fever, No chills, No fatigue, No myalgia, No Body ache, No Weakness  EYES: No eye pain,  No visual disturbances, No discharge, No Redness  ENMT: No ear pain, No nose bleed, No vertigo; No sinus pain, No throat pain, No Congestion  NECK: No pain, No stiffness  RESPIRATORY: No cough, No wheezing, No hemoptysis, No shortness of breath  CARDIOVASCULAR: + chest pain, No palpitations  GASTROINTESTINAL: No abdominal pain, No epigastric pain. No nausea, No vomiting, No diarrhea, No constipation; [  ] BM  GENITOURINARY: No dysuria, No frequency, No urgency, No hematuria, No incontinence  NEUROLOGICAL: No headaches, No dizziness, No numbness, No tingling, No tremors, No weakness  EXTREMITIES: No Swelling, No Pain, No Edema  MUSCULOSKELETAL: No joint pain, No joint swelling; No muscle pain, No back pain, No extremity pain  PSYCHIATRIC: No depression, No anxiety, No mood swings, No difficulty sleeping at night       Vital Signs Last 24 Hrs  T(C): 37.1 (07 Oct 2021 09:39), Max: 37.1 (07 Oct 2021 09:39)  T(F): 98.8 (07 Oct 2021 09:39), Max: 98.8 (07 Oct 2021 09:39)  HR: 71 (07 Oct 2021 09:39) (45 - 80)  BP: 160/70 (07 Oct 2021 09:39) (158/71 - 235/116)  BP(mean): --  RR: 18 (07 Oct 2021 09:39) (16 - 20)  SpO2: 96% (07 Oct 2021 09:39) (96% - 100%)    CAPILLARY BLOOD GLUCOSE      POCT Blood Glucose.: 146 mg/dL (07 Oct 2021 11:31)  POCT Blood Glucose.: 152 mg/dL (07 Oct 2021 07:50)      I&O's Summary    PHYSICAL EXAM:  GENERAL:  [ x ] NAD, [ x ] Well appearing, [  ] Agitated, [  ] Drowsy, [  ] Lethargy, [  ] Confused   HEAD:  [ x] Normal, [  ] Other  EYES:  [x ] EOMI, [ x ] PERRLA, [ x ] Conjunctiva and sclera clear normal, [  ] Other, [ x ] Pallor, [  ] Discharge  ENMT:  [ x ] Normal, [ x ] Moist mucous membranes, [  ] Good dentition, [ x ] No thrush  NECK:  [ x ] Supple, [ x ] No JVD, [ x ] Normal thyroid, [  ] Lymphadenopathy, [  ] Other  CHEST/LUNG:  [ x ] Clear to auscultation bilaterally, [ x ] Breath Sounds B/L decreased, b/l  [  ] Poor effort, [x  ] No rales, [ x ] No rhonchi, [ x ] No wheezing  HEART:  [ x ] Regular rate and rhythm, [  ] Tachycardia, [  ] Bradycardia, [  ] Irregular, [ x ] No murmurs, No rubs, No gallops, [  ] PPM in place (Mfr:  )  ABDOMEN:  [ x ] Soft, [ x ] Nontender, [ x ] Nondistended, [ x ] No mass, [ x ] Bowel sounds present, [ x ] Obese  NERVOUS SYSTEM:  [ x ] Alert & Oriented x3, [ x ] Nonfocal, [  ] Confusion, [  ] Encephalopathic, [  ] Sedated, [  ] Unable to assess, [  ] Dementia, [  ] Other-  EXTREMITIES:  [ x ] 2+ Peripheral Pulses, No clubbing, No cyanosis,  [ x ] Trace Edema B/L lower EXT, [  ] PVD stasis skin changes B/L lower EXT, [  ] Wound  LYMPH:  No lymphadenopathy noted      DIET: Diet, Consistent Carbohydrate Renal w/Evening Snack:   DASH/TLC Sodium & Cholesterol Restricted (10-07-21 @ 02:46)      LABS:                        9.8    8.57  )-----------( 272      ( 07 Oct 2021 04:23 )             32.2     07 Oct 2021 04:23    144    |  109    |  32     ----------------------------<  138    3.2     |  25     |  2.00     Ca    9.4        07 Oct 2021 04:23  Phos  4.2       07 Oct 2021 04:23  Mg     2.0       07 Oct 2021 04:23    TPro  7.1    /  Alb  3.1    /  TBili  0.3    /  DBili  x      /  AST  19     /  ALT  24     /  AlkPhos  39     07 Oct 2021 04:23    PT/INR - ( 06 Oct 2021 21:49 )   PT: 13.9 sec;   INR: 1.20 ratio         PTT - ( 06 Oct 2021 21:49 )  PTT:36.3 sec  Urinalysis Basic - ( 06 Oct 2021 21:49 )    Color: Yellow / Appearance: Clear / S.020 / pH: x  Gluc: x / Ketone: Negative  / Bili: Negative / Urobili: Negative   Blood: x / Protein: 500 mg/dL / Nitrite: Negative   Leuk Esterase: Negative / RBC: 3-5 /HPF / WBC 0-2   Sq Epi: x / Non Sq Epi: Occasional / Bacteria: Occasional      CARDIAC MARKERS ( 07 Oct 2021 04:23 )  .145 ng/mL / x     / 106 U/L / x     / 2.2 ng/mL  CARDIAC MARKERS ( 06 Oct 2021 21:51 )  .149 ng/mL / x     / x     / x     / x             Anemia Panel:      Thyroid Panel:  Thyroid Stimulating Hormone, Serum: 1.35 uIU/mL (10-07-21 @ 04:23)      Serum Pro-Brain Natriuretic Peptide: 7438 pg/mL (10-07-21 @ 04:23)  Serum Pro-Brain Natriuretic Peptide: 8061 pg/mL (10-06-21 @ 21:49)      RADIOLOGY & ADDITIONAL TESTS:   < from: CT Head No Cont (10.07.21 @ 00:03) >    EXAM:  CT BRAIN                            PROCEDURE DATE:  10/07/2021          INTERPRETATION:  CLINICAL INDICATION:  headache    TECHNIQUE: Noncontrast CT examination of the head was performed using contiguous 5 mm CT images.    COMPARISON: 2020    FINDINGS:    There is no evidence of mass or acute intracranial hemorrhage. Ventricles and sulci are normal in size and configuration for the patient's stated age. No midline shift or other significant mass effect is noted. There is no CT evidence of acute territorial infarct. There are periventricular white matter hypodensities that are nonspecific in nature but may reflect chronic ischemic microvascular disease.      The visualized paranasal sinuses and tympanomastoid spaces are clear. Orbits and orbital contents are unremarkable.    Status post left frontoparietal craniotomy with miniplate and screw fixation. There is no depressed calvarial fracture.        IMPRESSION:    No evidence of acute intracranial hemorrhage, midline shift or CT evidence of acute territorial infarct.    If the patient's symptoms persist, consider short interval follow-up head CT or brain MRI if there are no MRI contraindications.      < end of copied text >  < from: CT Chest No Cont (10.07.21 @ 00:03) >  TECHNIQUE: CT imaging of thechest, abdomen, and pelvis was obtained without IV contrast. MIP images were also obtained.    COMPARISON: 2021    FINDINGS:    Chest:    Multiple heterogeneous thyroid nodules noted bilaterally which can be better evaluated with ultrasound.    There is no supraclavicular, axillary, mediastinal or hilar lymphadenopathy.    The heart is mildly enlarged. There is no pericardial effusion.    Branching nodular opacities and patchy airspace opacities noted in the lingula and bilateral lower lobeswhich are likely infectious in etiology. No pleural effusions are present. The tracheobronchial tree is patent.    Abdomen/Pelvis:    The liver, spleen, pancreas, gallbladder and biliary tree are unremarkable.    There is no hydronephrosis. Stable 2.6 cm right renal lesion. The adrenal glands are unremarkable.    There is no bowel obstruction or ascites. Scattered colonic diverticula. The appendix is normal.    The bladder, prostate and seminal vesicles are unremarkable.    There is no abdominal or pelvic lymphadenopathy.    The aorta and IVC are unremarkable.    Degenerative changes of the spine.    IMPRESSION:    Multilobar pneumonia.    Mild cardiomegaly.    No small bowel obstruction or active bowel inflammation.    --- End of Report ---        < end of copied text >        HEALTH ISSUES - PROBLEM Dx:        Consultant(s) Notes Reviewed:  [ x ] YES     Care Discussed with [ x ] Consultants, [ x ] Patient, [  ] Family, [  ] HCP, [  ] , [  ] Social Service, [ x ] RN, [  ] Physical Therapy, [  ] Palliative Care Team  DVT PPX: [  ] Lovenox, [  ] SC Heparin, [  ] Coumadin, [  ] Xarelto, [x  ] Eliquis, [  ] Pradaxa, [  ] IV Heparin drip, [  ] SCD, [  ] Ambulation, [  ] Contraindicated 2/2 GI Bleed, [  ] Contraindicated 2/2  Bleed, [  ] Contraindicated 2/2 Brain Bleed  Advanced Directive: [ x ] None, [  ] DNR/DNI Patient is a 76y old  Male who presents with a chief complaint of multilobar PNA, afib (07 Oct 2021 09:58)    History of Present Illness:   77 yo M with PMHx of Type 2 DM (not on insulin), BPH, CAD s/p stents >4 years ago (not on plavix), diverticulitis (hospitalized 2020 at [facility]), GIB 2/2 diverticulosis (), anxiety, Lupus, HTN, HLD, CKD stage 3, HepC, 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 Pt states he would intermittently feel chills and feverish, recorded no temps at group home since last couple of days, he states progressively worsening of dyspnea on exertion and rest, patient unable to walk short distances or climb stairs due to dyspnea, however denies orthopnea, cough, sputum production, night sweats. Patient mentioned some dizziness associated with lower extremities weakness, usually ambulates independently but now feels unable to hold his weight. Regarding palpitations, pt states for 3 days his heart would skip a beat, return to normal. Self limited episodes under a minute, no associated chest pain. Pt also describes urinary frequency beyond baseline, denies dysuria or incontinence, hematuria, constipation.     ED course:   VS: Afebrile, HR: 80 BP: 220/100->235/116-> 189/85, Spo2: 98 on NC RR: 20  Labs significant for low stable hemoglobin, BUN/creatinine: 36/2.30, Trop 0.149, Pro BMP 8061.   EKG on admission showed Afib @ 64 bpm, incompleta right BBB, LVH  CT head shows no  evidence of acute intracranial hemorrhage, midline shift or CT evidence of acute territorial infarct.  CT chest A/P demonstrated Multilobar pneumonia. Mild cardiomegaly. No mall bowel obstruction or active bowel inflammation.  Patient was given in the ED 10 mg IVP hydralazine 10 mg Labetalol IVP 1x, 2000 cc bolus NS 1x       INTERVAL HPI:  10/07/21: Pt seen and examined at bedside; in no acute distress; complains of intermittent palpitation and transient chest pain. On Zosyn 3.375gm q8h + azithromycin 500mg daily; concern for medication compliance; ??capacity. will consult psych. K replaced      OVERNIGHT EVENTS: None     Home Medications:  cloNIDine 0.1 mg oral tablet: 2 tab(s) orally 3 times a day (07 Oct 2021 03:41)  digoxin 125 mcg (0.125 mg) oral tablet: 1 tab(s) orally once a day (07 Oct 2021 03:41)  Eliquis 5 mg oral tablet: 1 tab(s) orally 2 times a day (07 Oct 2021 03:41)  famotidine 40 mg oral tablet: 1 tab(s) orally once a day (at bedtime) (07 Oct 2021 03:41)  fenofibrate 145 mg oral tablet: 1 tab(s) orally once a day (07 Oct 2021 03:41)  labetalol 100 mg oral tablet: 1 tab(s) orally 2 times a day (07 Oct 2021 03:41)  Lasix 20 mg oral tablet: 1 tab(s) orally 2 times a day (07 Oct 2021 03:41)  oxybutynin 5 mg oral tablet: 1 tab(s) orally 4 times a day (07 Oct 2021 03:41)  pantoprazole 40 mg oral delayed release tablet: 1 tab(s) orally once a day (before a meal) (07 Oct 2021 03:41)  Symbicort 160 mcg-4.5 mcg/inh inhalation aerosol: 2 puff(s) inhaled 2 times a day (07 Oct 2021 03:41)      MEDICATIONS  (STANDING):  amLODIPine   Tablet 10 milliGRAM(s) Oral daily  apixaban 5 milliGRAM(s) Oral every 12 hours  ARIPiprazole 30 milliGRAM(s) Oral daily  aspirin enteric coated 81 milliGRAM(s) Oral daily  azithromycin   Tablet 500 milliGRAM(s) Oral daily  budesonide 160 MICROgram(s)/formoterol 4.5 MICROgram(s) Inhaler 2 Puff(s) Inhalation two times a day  cloNIDine 0.2 milliGRAM(s) Oral three times a day  dextrose 40% Gel 15 Gram(s) Oral once  dextrose 5%. 1000 milliLiter(s) (50 mL/Hr) IV Continuous <Continuous>  dextrose 50% Injectable 25 Gram(s) IV Push once  doxazosin 4 milliGRAM(s) Oral <User Schedule>  famotidine    Tablet 20 milliGRAM(s) Oral every 48 hours  fenofibrate Tablet 145 milliGRAM(s) Oral daily  furosemide    Tablet 20 milliGRAM(s) Oral two times a day  glucagon  Injectable 1 milliGRAM(s) IntraMuscular once  hydrALAZINE 50 milliGRAM(s) Oral every 8 hours  insulin lispro (ADMELOG) corrective regimen sliding scale   SubCutaneous three times a day before meals  labetalol 100 milliGRAM(s) Oral two times a day  lamoTRIgine 100 milliGRAM(s) Oral daily  mirtazapine 30 milliGRAM(s) Oral at bedtime  nystatin Powder 1 Application(s) Topical two times a day  oxybutynin 5 milliGRAM(s) Oral four times a day  pantoprazole    Tablet 40 milliGRAM(s) Oral before breakfast  piperacillin/tazobactam IVPB.. 3.375 Gram(s) IV Intermittent every 8 hours  venlafaxine XR. 150 milliGRAM(s) Oral daily    MEDICATIONS  (PRN):  acetaminophen   Tablet .. 650 milliGRAM(s) Oral every 6 hours PRN Temp greater or equal to 38C (100.4F), Mild Pain (1 - 3)  melatonin 3 milliGRAM(s) Oral at bedtime PRN Insomnia  ondansetron Injectable 4 milliGRAM(s) IV Push every 8 hours PRN Nausea and/or Vomiting      No Known Allergies      Social History:  Tobacco: quit in , not active smoker  EtOH: denies   Recreational drug use: cocaine several years ago "a few times" has not used in "many years"  Lives with: [facility]; group home; no nursing assistance; observation is there  Ambulates: independent, denies walker or cane but uses guard railings  ADLs: independent, but states need for assistance in bathing, cooking; independent with feeding, ambulating  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 No nausea, No vomiting, No diarrhea, No constipation; [  ] BM  GENITOURINARY: No dysuria, No frequency, No urgency, No hematuria, No incontinence  NEUROLOGICAL: No headaches, No dizziness, No numbness, No tingling, No tremors, No weakness  EXTREMITIES: No Swelling, No Pain, No Edema  MUSCULOSKELETAL: No joint pain, No joint swelling; No muscle pain, No back pain, No extremity pain  PSYCHIATRIC: No depression, No anxiety, No mood swings, No difficulty sleeping at night       Vital Signs Last 24 Hrs  T(C): 37.1 (07 Oct 2021 09:39), Max: 37.1 (07 Oct 2021 09:39)  T(F): 98.8 (07 Oct 2021 09:39), Max: 98.8 (07 Oct 2021 09:39)  HR: 71 (07 Oct 2021 09:39) (45 - 80)  BP: 160/70 (07 Oct 2021 09:39) (158/71 - 235/116)  BP(mean): --  RR: 18 (07 Oct 2021 09:39) (16 - 20)  SpO2: 96% (07 Oct 2021 09:39) (96% - 100%)    CAPILLARY BLOOD GLUCOSE      POCT Blood Glucose.: 146 mg/dL (07 Oct 2021 11:31)  POCT Blood Glucose.: 152 mg/dL (07 Oct 2021 07:50)      I&O's Summary    PHYSICAL EXAM:  GENERAL:  [ x ] NAD, [ x ] Well appearing, [  ] Agitated, [  ] Drowsy, [  ] Lethargy, [  ] Confused   HEAD:  [ x] Normal, [  ] Other  EYES:  [x ] EOMI, [ x ] PERRLA, [ x ] Conjunctiva and sclera clear normal, [  ] Other, [ x ] Pallor, [  ] Discharge  ENMT:  [ x ] Normal, [ x ] Moist mucous membranes, [  ] Good dentition, [ x ] No thrush  NECK:  [ x ] Supple, [ x ] No JVD, [ x ] Normal thyroid, [  ] Lymphadenopathy, [  ] Other  CHEST/LUNG:  [ x ] Clear to auscultation bilaterally, [ x ] Breath Sounds B/L decreased, b/l  [  ] Poor effort, [x  ] No rales, [ x ] No rhonchi, [ x ] No wheezing  HEART:  [ x ] Regular rate and rhythm, [  ] Tachycardia, [  ] Bradycardia, [  ] Irregular, [ x ] No murmurs, No rubs, No gallops, [  ] PPM in place (Mfr:  )  ABDOMEN:  [ x ] Soft, [ x ] Nontender, [ x ] Nondistended, [ x ] No mass, [ x ] Bowel sounds present, [ x ] Obese  NERVOUS SYSTEM:  [ x ] Alert & Oriented x3, [ x ] Nonfocal, [  ] Confusion, [  ] Encephalopathic, [  ] Sedated, [  ] Unable to assess, [  ] Dementia, [  ] Other-  EXTREMITIES:  [ x ] 2+ Peripheral Pulses, No clubbing, No cyanosis,  [ x ] Trace Edema B/L lower EXT, [  ] PVD stasis skin changes B/L lower EXT, [  ] Wound  LYMPH:  No lymphadenopathy noted  SKIN:  [ x ] No rashes or lesions, [  ] Pressure ulcers, [  ] Ecchymosis, [  ] Skin tears, [ x ] Other- Tattoos on EXT         DIET: Diet, Consistent Carbohydrate Renal w/Evening Snack:   DASH/TLC Sodium & Cholesterol Restricted (10-07-21 @ 02:46)      LABS:                        9.8    8.57  )-----------( 272      ( 07 Oct 2021 04:23 )             32.2     07 Oct 2021 04:23    144    |  109    |  32     ----------------------------<  138    3.2     |  25     |  2.00     Ca    9.4        07 Oct 2021 04:23  Phos  4.2       07 Oct 2021 04:23  Mg     2.0       07 Oct 2021 04:23    TPro  7.1    /  Alb  3.1    /  TBili  0.3    /  DBili  x      /  AST  19     /  ALT  24     /  AlkPhos  39     07 Oct 2021 04:23    PT/INR - ( 06 Oct 2021 21:49 )   PT: 13.9 sec;   INR: 1.20 ratio         PTT - ( 06 Oct 2021 21:49 )  PTT:36.3 sec  Urinalysis Basic - ( 06 Oct 2021 21:49 )    Color: Yellow / Appearance: Clear / S.020 / pH: x  Gluc: x / Ketone: Negative  / Bili: Negative / Urobili: Negative   Blood: x / Protein: 500 mg/dL / Nitrite: Negative   Leuk Esterase: Negative / RBC: 3-5 /HPF / WBC 0-2   Sq Epi: x / Non Sq Epi: Occasional / Bacteria: Occasional      CARDIAC MARKERS ( 07 Oct 2021 04:23 )  .145 ng/mL / x     / 106 U/L / x     / 2.2 ng/mL  CARDIAC MARKERS ( 06 Oct 2021 21:51 )  .149 ng/mL / x     / x     / x     / x             Anemia Panel:      Thyroid Panel:  Thyroid Stimulating Hormone, Serum: 1.35 uIU/mL (10-07-21 @ 04:23)      Serum Pro-Brain Natriuretic Peptide: 7438 pg/mL (10-07-21 @ 04:23)  Serum Pro-Brain Natriuretic Peptide: 8061 pg/mL (10-06-21 @ 21:49)      RADIOLOGY & ADDITIONAL TESTS:   < from: CT Head No Cont (10.07.21 @ 00:03) >    EXAM:  CT BRAIN                            PROCEDURE DATE:  10/07/2021          INTERPRETATION:  CLINICAL INDICATION:  headache    TECHNIQUE: Noncontrast CT examination of the head was performed using contiguous 5 mm CT images.    COMPARISON: 2020    FINDINGS:    There is no evidence of mass or acute intracranial hemorrhage. Ventricles and sulci are normal in size and configuration for the patient's stated age. No midline shift or other significant mass effect is noted. There is no CT evidence of acute territorial infarct. There are periventricular white matter hypodensities that are nonspecific in nature but may reflect chronic ischemic microvascular disease.      The visualized paranasal sinuses and tympanomastoid spaces are clear. Orbits and orbital contents are unremarkable.    Status post left frontoparietal craniotomy with miniplate and screw fixation. There is no depressed calvarial fracture.        IMPRESSION:    No evidence of acute intracranial hemorrhage, midline shift or CT evidence of acute territorial infarct.    If the patient's symptoms persist, consider short interval follow-up head CT or brain MRI if there are no MRI contraindications.      < end of copied text >  < from: CT Chest No Cont (10.07.21 @ 00:03) >  TECHNIQUE: CT imaging of thechest, abdomen, and pelvis was obtained without IV contrast. MIP images were also obtained.    COMPARISON: 2021    FINDINGS:    Chest:    Multiple heterogeneous thyroid nodules noted bilaterally which can be better evaluated with ultrasound.    There is no supraclavicular, axillary, mediastinal or hilar lymphadenopathy.    The heart is mildly enlarged. There is no pericardial effusion.    Branching nodular opacities and patchy airspace opacities noted in the lingula and bilateral lower lobeswhich are likely infectious in etiology. No pleural effusions are present. The tracheobronchial tree is patent.    Abdomen/Pelvis:    The liver, spleen, pancreas, gallbladder and biliary tree are unremarkable.    There is no hydronephrosis. Stable 2.6 cm right renal lesion. The adrenal glands are unremarkable.    There is no bowel obstruction or ascites. Scattered colonic diverticula. The appendix is normal.    The bladder, prostate and seminal vesicles are unremarkable.    There is no abdominal or pelvic lymphadenopathy.    The aorta and IVC are unremarkable.    Degenerative changes of the spine.    IMPRESSION:    Multilobar pneumonia.    Mild cardiomegaly.    No small bowel obstruction or active bowel inflammation.    --- End of Report ---        < end of copied text >        HEALTH ISSUES - PROBLEM Dx:        Consultant(s) Notes Reviewed:  [ x ] YES     Care Discussed with [ x ] Consultants, [ x ] Patient, [  ] Family, [  ] HCP, [  ] , [  ] Social Service, [ x ] RN, [  ] Physical Therapy, [  ] Palliative Care Team  DVT PPX: [  ] Lovenox, [  ] SC Heparin, [  ] Coumadin, [  ] Xarelto, [x  ] Eliquis, [  ] Pradaxa, [  ] IV Heparin drip, [  ] SCD, [  ] Ambulation, [  ] Contraindicated 2/2 GI Bleed, [  ] Contraindicated 2/2  Bleed, [  ] Contraindicated 2/2 Brain Bleed  Advanced Directive: [ x ] None, [  ] DNR/DNI

## 2021-10-07 NOTE — ED ADULT NURSE REASSESSMENT NOTE - NS ED NURSE REASSESS COMMENT FT1
Patient had 1 urinary incontinences episode. Patient gown and linen changed. Patient repositioned in hospital bed. Jammie SALGUERO

## 2021-10-07 NOTE — PROGRESS NOTE ADULT - ATTENDING COMMENTS
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 Patient admitted for hypertensive urgency, Afib and PNA.   pt seen, examined case & care plan d/w pt, residents at detail.  AM labs   PO diet   D/W ID-Dr Garner- Add Zithromax   Cardio Follow up- BP meds.  -Total care time 40 minutes.

## 2021-10-07 NOTE — H&P ADULT - NSHPADDITIONALINFOADULT_GEN_ALL_CORE
BPH - Chronic, patient reports increased in urinary frequency   - On oxybutynin 5 mg q6h will continue  - DASH/ TLC diet

## 2021-10-07 NOTE — DISCHARGE NOTE PROVIDER - NSDCMRMEDTOKEN_GEN_ALL_CORE_FT
Adult Aspirin Regimen 81 mg oral delayed release tablet: 1 tab(s) orally once a day  amLODIPine 10 mg oral tablet: 1 tab(s) orally once a day  ARIPiprazole 30 mg oral tablet: 1 tab(s) orally once a day  cloNIDine 0.1 mg oral tablet: 2 tab(s) orally 3 times a day  digoxin 125 mcg (0.125 mg) oral tablet: 1 tab(s) orally once a day  doxazosin 4 mg oral tablet: 1 tab(s) orally 2 times a day  Eliquis 5 mg oral tablet: 1 tab(s) orally 2 times a day  famotidine 40 mg oral tablet: 1 tab(s) orally once a day (at bedtime)  fenofibrate 145 mg oral tablet: 1 tab(s) orally once a day  hydrALAZINE 50 mg oral tablet: 1 tab(s) orally every 8 hours  labetalol 100 mg oral tablet: 1 tab(s) orally 2 times a day  LaMICtal 100 mg oral tablet: 1 tab(s) orally once a day  Lasix 20 mg oral tablet: 1 tab(s) orally 2 times a day  mirtazapine 30 mg oral tablet: 1 tab(s) orally once a day (at bedtime)  oxybutynin 5 mg oral tablet: 1 tab(s) orally 4 times a day  pantoprazole 40 mg oral delayed release tablet: 1 tab(s) orally once a day (before a meal)  Symbicort 160 mcg-4.5 mcg/inh inhalation aerosol: 2 puff(s) inhaled 2 times a day  venlafaxine 150 mg oral capsule, extended release: 1 cap(s) orally once a day   Adult Aspirin Regimen 81 mg oral delayed release tablet: 1 tab(s) orally once a day  amLODIPine 10 mg oral tablet: 1 tab(s) orally once a day  ARIPiprazole 30 mg oral tablet: 1 tab(s) orally once a day  cloNIDine 0.1 mg oral tablet: 2 tab(s) orally 3 times a day  digoxin 125 mcg (0.125 mg) oral tablet: 1 tab(s) orally once a day  doxazosin 4 mg oral tablet: 1 tab(s) orally 2 times a day  Eliquis 5 mg oral tablet: 1 tab(s) orally 2 times a day  famotidine 40 mg oral tablet: 1 tab(s) orally once a day (at bedtime)  fenofibrate 145 mg oral tablet: 1 tab(s) orally once a day  hydrALAZINE 50 mg oral tablet: 1 tab(s) orally every 8 hours  labetalol 100 mg oral tablet: 1 tab(s) orally 2 times a day  LaMICtal 100 mg oral tablet: 1 tab(s) orally once a day  Lasix 20 mg oral tablet: 1 tab(s) orally 2 times a day  mirtazapine 30 mg oral tablet: 1 tab(s) orally once a day (at bedtime)  oxybutynin 5 mg oral tablet: 1 tab(s) orally 4 times a day  pantoprazole 40 mg oral delayed release tablet: 1 tab(s) orally once a day (before a meal)  Rolling walker:   Symbicort 160 mcg-4.5 mcg/inh inhalation aerosol: 2 puff(s) inhaled 2 times a day  venlafaxine 150 mg oral capsule, extended release: 1 cap(s) orally once a day   Adult Aspirin Regimen 81 mg oral delayed release tablet: 1 tab(s) orally once a day  amLODIPine 10 mg oral tablet: 1 tab(s) orally once a day  ARIPiprazole 30 mg oral tablet: 1 tab(s) orally once a day  cloNIDine 0.1 mg oral tablet: 2 tab(s) orally 3 times a day  doxazosin 8 mg oral tablet: 1 tab(s) orally once a day (at bedtime)  Eliquis 5 mg oral tablet: 1 tab(s) orally 2 times a day  famotidine 40 mg oral tablet: 1 tab(s) orally once a day (at bedtime)  fenofibrate 145 mg oral tablet: 1 tab(s) orally once a day  furosemide 20 mg oral tablet: 1 tab(s) orally once a day  hydrALAZINE 100 mg oral tablet: 1 tab(s) orally every 8 hours  isosorbide mononitrate 30 mg oral tablet, extended release: 1 tab(s) orally once a day  LaMICtal 100 mg oral tablet: 1 tab(s) orally once a day  mirtazapine 30 mg oral tablet: 1 tab(s) orally once a day (at bedtime)  NIFEdipine 30 mg oral tablet, extended release: 1 tab(s) orally once a day  oxybutynin 5 mg oral tablet: 1 tab(s) orally 4 times a day  pantoprazole 40 mg oral delayed release tablet: 1 tab(s) orally once a day (before a meal)  Rolling walker:   Symbicort 160 mcg-4.5 mcg/inh inhalation aerosol: 2 puff(s) inhaled 2 times a day  venlafaxine 150 mg oral capsule, extended release: 1 cap(s) orally once a day   Adult Aspirin Regimen 81 mg oral delayed release tablet: 1 tab(s) orally once a day  amLODIPine 10 mg oral tablet: 1 tab(s) orally once a day  ARIPiprazole 30 mg oral tablet: 1 tab(s) orally once a day  cloNIDine 0.1 mg oral tablet: 1 tab(s) orally 3 times a day  doxazosin 8 mg oral tablet: 1 tab(s) orally once a day (at bedtime)  Eliquis 5 mg oral tablet: 1 tab(s) orally 2 times a day  famotidine 40 mg oral tablet: 1 tab(s) orally once a day (at bedtime)  furosemide 20 mg oral tablet: 1 tab(s) orally once a day  hydrALAZINE 100 mg oral tablet: 1 tab(s) orally every 8 hours  isosorbide mononitrate 30 mg oral tablet, extended release: 1 tab(s) orally once a day  LaMICtal 100 mg oral tablet: 1 tab(s) orally once a day  mirtazapine 30 mg oral tablet: 1 tab(s) orally once a day (at bedtime)  NIFEdipine 30 mg oral tablet, extended release: 1 tab(s) orally once a day  oxybutynin 5 mg oral tablet: 1 tab(s) orally 4 times a day  pantoprazole 40 mg oral delayed release tablet: 1 tab(s) orally once a day (before a meal)  Rolling walker:   Symbicort 160 mcg-4.5 mcg/inh inhalation aerosol: 2 puff(s) inhaled 2 times a day  venlafaxine 150 mg oral capsule, extended release: 1 cap(s) orally once a day   Adult Aspirin Regimen 81 mg oral delayed release tablet: 1 tab(s) orally once a day  amLODIPine 10 mg oral tablet: 1 tab(s) orally once a day  ARIPiprazole 30 mg oral tablet: 1 tab(s) orally once a day  atorvastatin 10 mg oral tablet: 1 tab(s) orally once a day  cloNIDine 0.1 mg oral tablet: 1 tab(s) orally 3 times a day  cyanocobalamin 1000 mcg oral tablet: 1 tab(s) orally once a day  doxazosin 8 mg oral tablet: 1 tab(s) orally once a day (at bedtime)  Eliquis 5 mg oral tablet: 1 tab(s) orally 2 times a day  folic acid 1 mg oral tablet: 1 tab(s) orally once a day  hydrALAZINE 100 mg oral tablet: 1 tab(s) orally every 8 hours  isosorbide mononitrate 30 mg oral tablet, extended release: 1 tab(s) orally once a day  LaMICtal 100 mg oral tablet: 1 tab(s) orally once a day  Lasix 40 mg oral tablet: 1 tab(s) orally once a day  mirtazapine 30 mg oral tablet: 1 tab(s) orally once a day (at bedtime)  NIFEdipine 60 mg oral tablet, extended release: 1 tab(s) orally once a day  oxybutynin 5 mg oral tablet: 1 tab(s) orally 4 times a day  pantoprazole 40 mg oral delayed release tablet: 1 tab(s) orally once a day (before a meal)  Pepcid 20 mg oral tablet: 1 tab(s) orally every other day  Rolling walker:   Symbicort 160 mcg-4.5 mcg/inh inhalation aerosol: 2 puff(s) inhaled 2 times a day  venlafaxine 150 mg oral capsule, extended release: 1 cap(s) orally once a day   Adult Aspirin Regimen 81 mg oral delayed release tablet: 1 tab(s) orally once a day  amLODIPine 10 mg oral tablet: 1 tab(s) orally once a day   amLODIPine 10 mg oral tablet: 1 tab(s) orally once a day  ARIPiprazole 30 mg oral tablet: 1 tab(s) orally once a day  Aspirin Enteric Coated 81 mg oral delayed release tablet: 1 tab(s) orally once a day   atorvastatin 10 mg oral tablet: 1 tab(s) orally once a day  atorvastatin 10 mg oral tablet: 1 tab(s) orally once a day   cloNIDine 0.1 mg oral tablet: 1 tab(s) orally 3 times a day  cyanocobalamin 1000 mcg oral tablet: 1 tab(s) orally once a day  doxazosin 8 mg oral tablet: 1 tab(s) orally once a day (at bedtime)  Eliquis 5 mg oral tablet: 1 tab(s) orally 2 times a day  folic acid 1 mg oral tablet: 1 tab(s) orally once a day  hydrALAZINE 100 mg oral tablet: 1 tab(s) orally every 8 hours  isosorbide mononitrate 30 mg oral tablet, extended release: 1 tab(s) orally once a day  LaMICtal 100 mg oral tablet: 1 tab(s) orally once a day  Lasix 40 mg oral tablet: 1 tab(s) orally once a day  mirtazapine 30 mg oral tablet: 1 tab(s) orally once a day (at bedtime)  NIFEdipine 60 mg oral tablet, extended release: 1 tab(s) orally once a day  NIFEdipine 60 mg oral tablet, extended release: 1 tab(s) orally once a day   oxybutynin 5 mg oral tablet: 1 tab(s) orally 4 times a day  pantoprazole 40 mg oral delayed release tablet: 1 tab(s) orally once a day (before a meal)  Pepcid 20 mg oral tablet: 1 tab(s) orally every other day  Rolling walker:   Symbicort 160 mcg-4.5 mcg/inh inhalation aerosol: 2 puff(s) inhaled 2 times a day  venlafaxine 150 mg oral capsule, extended release: 1 cap(s) orally once a day

## 2021-10-07 NOTE — H&P ADULT - ATTENDING COMMENTS
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 Patient admitted for hypertensive urgency, Afib and PNA.   pt seen, examined case & care plan d/w pt, residents at detail.  AM labs   PO diet   PT eval.  Consults:  Cardiology-Dr Gamboa  -ID Dr Garner

## 2021-10-07 NOTE — H&P ADULT - ASSESSMENT
#Abnormal finding on CT Chest  - does not meet sepsis criteria, afebrile, WBC WNL, HR 80 on admission  - CT Chest showed Branching nodular opacities and patchy airspace opacities noted in the lingula and bilateral lower lobes which are likely infectious in etiology. No pleural effusions are present. The tracheobronchial tree is patent. Multilobar PNA.  - Given 1g rocephin in the ED, 2100 mL NS bolus  - will start IV zosyn q8hrs   - F/U BCx, UCx  - legionalla, strep urine antigens    #Hypertensive Urgency  - Acute; has had episode in 07/2021 when admitted for diverticulitis; resolved with home meds and IV labetalol and hydralazine  - SBP >200 in the ED today  - s/p 10mg IV hydralazine, 10mg IV labetalol  - c/w home medications  - c/w IV hydralazine 10mg q6hrs  - elevated troponins on admission, likely 2/2 emergency vs. demand ischemia?    #CKD  - Cr 2.3 on admission,   - unknown BLCr after reviewing [facility]  -      #Multilobar PNA  - does not meet sepsis criteria, afebrile, WBC WNL, HR 80 on admission  - CT Chest showed Branching nodular opacities and patchy airspace opacities noted in the lingula and bilateral lower lobes which are likely infectious in etiology. No pleural effusions are present. The tracheobronchial tree is patent. Multilobar PNA.  - Given 1g rocephin in the ED, 2100 mL NS bolus  - will start IV zosyn q8hrs   - F/U BCx, UCx  - legionalla, strep urine antigens    #Hypertensive Urgency  - Acute; has had episode in 07/2021 when admitted for diverticulitis; resolved with home meds and IV labetalol and IV hydralazine  - SBP >200 in the ED today  - s/p 10mg IV hydralazine, 10mg IV labetalol  - c/w home medications  - c/w IV hydralazine 10mg q6hrs  - elevated troponins on admission, likely 2/2 emergency vs. demand ischemia?    #CKD  - Cr 2.3 on admission,   - unknown BLCr after reviewing [facility]  -      #Multilobar PNA  - does not meet sepsis criteria, afebrile, WBC WNL, HR 80 on admission  - CT Chest showed Branching nodular opacities and patchy airspace opacities noted in the lingula and bilateral lower lobes which are likely infectious in etiology. No pleural effusions are present. The tracheobronchial tree is patent. Multilobar PNA.  - Given 1g rocephin in the ED, 2100 mL NS bolus  - will start IV zosyn q8hrs   - F/U BCx, UCx  - legionalla, strep urine antigens    #AFib  - brief episodes of palpitations in the last week  - EKG shows  - Last Echo 7/21/21 IMPRESSION: Normal left ventricular systolic function, estimated LVEF of 60%. Grossly normal RV size and systolic function.Left atrial enlargement. Calcified trileaflet aortic valve with mild aortic stenosis, without AI. Mild MR Trace TR. No significant pericardial effusion.    #Hypertensive Urgency  - Acute; has had episode in 07/2021 when admitted for diverticulitis; resolved with home meds and IV labetalol and IV hydralazine  - SBP >200 in the ED today  - s/p 10mg IV hydralazine, 10mg IV labetalol  - c/w home medications  - c/w IV hydralazine 10mg q6hrs  - elevated troponins on admission, likely 2/2 emergency vs. demand ischemia?    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 presenting to [facility] today with multiple concerns including subjective fevers, SOB, weakness, and brief episodes of palpitations, and urinary frequency "for at least 3 days". Patient admitted for hypertensive urgency, Afib and PNA.     #Multilobar PNA  - Admit to telemetry unit   - On admission does not meet sepsis criteria; afebrile, WBC WNL, normal lactate   - RIVP negative on admission   - CT Chest showed branching nodular opacities and patchy airspace opacities noted in the lingula and bilateral lower lobes which are likely infectious in etiology. No pleural effusions are present.   - Given 1g Rocephin and Azithromycin and IVF 2100 mL NS bolus in the ED   - Will start IV zosyn q8hrs   - F/U BCx, UCx, legionella strep urine antigens  - F/up procalcitonin   - ID consulted Dr. Garner, recs appreciated     # Afib   - Patient presents with new onset Afib, however pt's PCP prescribed digoxin, Eliquis by PCP on sept 14/21  - Admit to telemetry  - Given in the ED labetalol 10 mg IVP in th ED   - EKG on admission showed Afib @ 64 bpm, incompleta right BBB, LVH   - Last TTE on 7/21/21 demonstrate normal left ventricular systolic function, estimated LVEF of 60%.LV diastolic dysfunction.  - On digoxin, Eliquis labetalol, will continue   - Will continue with Eliquis 5mg BID   - Monitor lytes and replete as needed  - Continuous cardiac monitoring  - Cardiology ([facility]) consulted, f/u recs    #Hypertensive Urgency  - On admission /100  - Of note patient recently admitted with hypertensive urgency on 07/2021  - s/p 10mg IV hydralazine, 10mg IV labetalol in the ED   - On clonidine 0.2 TID, hydralazine 50mg TID, labetalol 100 mg BID, amlodipine 10 mg qd   - Will continue with home medications with hold parameters   - DASH/ TLC diet   - Continue to monitor BP     # Elevated troponin, possible demand ischemia   - First cardiac enzymes set shows troponin 0.149  - EKG on admission showed Afib @ 64 bpm, incompleta right BBB, LVH   - Will trend cardiac enzymes q6 (second at 6 am)    # CAD s/p  PCI with stents  - Elevated troponin in setting of hypertensive urgency  - On ASA and fenofibrate,, will continue   - Monitor and replete lytes, keep K>4, Mg>2.    # Diastolic Heart failure  -Patient presenting with worsening dyspnea on rest and exertion   - On physical exam mild volume overload   - Last TTE on 07/21  Last TTE on 7/21/21 demonstrate normal left ventricular systolic function, estimated LVEF of 60%.LV diastolic dysfunction.  - On admission Pro BNP 8061 elevated compare to previous one on July 4140  - s/p IVF 2 lt bolus in the ED   - On Lasix 20 mg BID, will continue w/ renal function monitor   - Strict I&O's     # Acute ARIELLA on CKD  - Baseline creatinine Cr 1.6 to 2.6  - On admission unknown 2.0-2.3   - Monitor BMP  - Avoid nephrotoxic medications  - Monitor renal indices  - Consider nephrology consult for worsening renal function    # BPH   - Chronic, patient reports increased in urinary frequency   - On oxybutynin 5 mg q6h will continue     # Anemia   - H/H 10.1/32 on admission, baseline hemoglobin 8.0-9.0  - Currently hemodynamically stable, monitor for signs and symptoms of active bleeding  - Iron studies on 07/21 demonstrated iron deficiency anemia   - F/u AM CBC    # Depression    - Chronic   - On Mirtazapine, venlafaxin, Lamictal, will continue     # History of T2DM   - last A1C on 07/21 6.1  - Off of meds   - Insulin sliding scale  - Accu checks w/ hypoglycemic protocol     # Abnormal CT finding   -CT chest on admission showed Multiple heterogeneous thyroid nodules noted bilaterally  - Will check TSH   - Recommended to follow up outpatient     # Need for prophylaxis measures   DVT ppx: improve score 1. Continue home Eliquis 5mg BID   IMPROVE VTE Individual Risk Assessment  RISK                                                                Points  [  ] Previous VTE                                                  3  [  ] Thrombophilia                                               2  [  ] Lower limb paralysis                                      2        (unable to hold up >15 seconds)    [  ] Current Cancer                                              2         (within 6 months)  [  ] Immobilization > 24 hrs                                1  [  ] ICU/CCU stay > 24 hours                              1  [x  ] Age > 60                                                      1  IMPROVE VTE Score _______1__       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 house presenting to [facility] today with multiple concerns including subjective fevers, SOB, weakness, and brief episodes of palpitations, and urinary frequency "for at least 3 days". Patient admitted for hypertensive urgency, Afib and PNA.

## 2021-10-07 NOTE — PROGRESS NOTE ADULT - PROBLEM SELECTOR PLAN 6
H/H 10.1/32 on admission, baseline hemoglobin 8.0-9.0  - Currently hemodynamically stable, likely related to CKD   - Iron studies on 07/21 demonstrated iron deficiency anemia   - F/u AM CBC Afib   - Patient presents with new onset Afib, however pt's PCP prescribed digoxin, Eliquis by PCP on sept 14/21  - Admit to telemetry  - EKG on admission showed Afib @ 64 bpm, incompleta right BBB, LVH   - Last TTE on 7/21/21 demonstrate normal left ventricular systolic function, estimated LVEF of 60%.LV diastolic dysfunction.  - On , Eliquis labetalol, will continue ?? on  digoxin- follow level   - Will continue with Eliquis 5mg BID   - Monitor lytes and replete as needed  - Continuous cardiac monitoring  - Cardiology Dr Gamboa called by ER

## 2021-10-07 NOTE — CONSULT NOTE ADULT - SUBJECTIVE AND OBJECTIVE BOX
[facility]  INFECTIOUS DISEASES   [address] [place] [address]  Tel: [phone]     Fax: [phone]  =======================================================    MRN-[id number]  TRIPP ZARATE     CC: multilobar PNA, afib     HPI:  77 yo man with PMH of HTN, DM2, CAD, HepC, CKD and afib was admitted from group home [facility] with subjective fevers, SOB, weakness, and brief episodes of palpitations, and urinary frequency for 3 days. No chest pain, nausea, vomiting fever, has dry cough. Stopped smoking in . In last few days feels progressively worsening of dyspnea on exertion and rest, patient unable to walk short distances or climb stairs due to dyspnea.    in ED CT chest A/P demonstrated Multilobar pneumonia so was started on zosyn and ID was called for further recommendations.     PAST MEDICAL & SURGICAL HISTORY:  HTN (hypertension)  c/b multiple episodes of hypertensive urgency  HLD (hyperlipidemia)  Atrial fibrillation  first documented on EKG 10/7/2021  CAD (coronary artery disease)  s/p stents (not on plavix)  Type 2 diabetes mellitus  not on home insulin  Anxiety  multiple psych medications  History of diverticulitis  2021  Diverticulosis  c/b GIB in   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  Had surgery ( 2013 )  S/P tonsillectomy  S/P arthroscopic knee surgery  Bilateral (  )  Torsion of testicle  Had surgery at age 14  Pilonidal cyst  Had surgery (  )  S/P cataract surgery  Bilateral  H/O hernia repair    Social Hx:     FAMILY HISTORY:  FHx: throat cancer    No family history of colorectal cancer    Allergies  No Known Allergies    Antibiotics:  piperacillin/tazobactam IVPB.. 3.375 Gram(s) IV Intermittent every 8 hours     REVIEW OF SYSTEMS:  CONSTITUTIONAL:  No Fever or chills  HEENT:  No diplopia or blurred vision.  No sore throat or runny nose.  CARDIOVASCULAR:  No chest pain or SOB.  RESPIRATORY:  No cough, shortness of breath, PND or orthopnea.  GASTROINTESTINAL:  No nausea, vomiting or diarrhea.  GENITOURINARY:  No dysuria, frequency or urgency. No Blood in urine  MUSCULOSKELETAL:  no joint aches, no muscle pain  SKIN:  No change in skin, hair or nails.  NEUROLOGIC:  No paresthesias, fasciculations, seizures or weakness.  PSYCHIATRIC:  No disorder of thought or mood.  ENDOCRINE:  No heat or cold intolerance, polyuria or polydipsia.  HEMATOLOGICAL:  No easy bruising or bleeding.     Physical Exam:  Vital Signs Last 24 Hrs  T(C): 37.1 (07 Oct 2021 09:39), Max: 37.1 (07 Oct 2021 09:39)  T(F): 98.8 (07 Oct 2021 09:39), Max: 98.8 (07 Oct 2021 09:39)  HR: 71 (07 Oct 2021 09:39) (45 - 80)  BP: 160/70 (07 Oct 2021 09:39) (158/71 - 235/116)  BP(mean): --  RR: 18 (07 Oct 2021 09:39) (16 - 20)  SpO2: 96% (07 Oct 2021 09:39) (96% - 100%)  Height (cm): 172.7 (10-07 @ 01:24)  Weight (kg): 102.1 (10-07 @ 01:24)  BMI (kg/m2): 34.2 (10-07 @ :24)  BSA (m2): 2.15 (10-07 @ 01:24)  GEN: NAD  HEENT: normocephalic and atraumatic. EOMI. PERRL.    NECK: Supple.  No lymphadenopathy   LUNGS: Coarse crackles, o wheezing or fine rales   HEART: Irregular rate and rhythm  ABDOMEN: Soft, nontender, and nondistended.  Positive bowel sounds.    : No CVA tenderness  EXTREMITIES: 1+ edema.  NEUROLOGIC: grossly intact.  PSYCHIATRIC: Appropriate affect .  SKIN: No ulceration or induration present.    Labs:  10-07    144  |  109<H>  |  32<H>  ----------------------------<  138<H>  3.2<L>   |  25  |  2.00<H>    Ca    9.4      07 Oct 2021 04:23  Phos  4.2     10-07  Mg     2.0     10-07    TPro  7.1  /  Alb  3.1<L>  /  TBili  0.3  /  DBili  x   /  AST  19  /  ALT  24  /  AlkPhos  39<L>  10-07                        9.8    8.57  )-----------( 272      ( 07 Oct 2021 04:23 )             32.2     PT/INR - ( 06 Oct 2021 21:49 )   PT: 13.9 sec;   INR: 1.20 ratio    PTT - ( 06 Oct 2021 21:49 )  PTT:36.3 sec  Urinalysis Basic - ( 06 Oct 2021 21:49 )    Color: Yellow / Appearance: Clear / S.020 / pH: x  Gluc: x / Ketone: Negative  / Bili: Negative / Urobili: Negative   Blood: x / Protein: 500 mg/dL / Nitrite: Negative   Leuk Esterase: Negative / RBC: 3-5 /HPF / WBC 0-2   Sq Epi: x / Non Sq Epi: Occasional / Bacteria: Occasional    LIVER FUNCTIONS - ( 07 Oct 2021 04:23 )  Alb: 3.1 g/dL / Pro: 7.1 g/dL / ALK PHOS: 39 U/L / ALT: 24 U/L / AST: 19 U/L / GGT: x           CARDIAC MARKERS ( 07 Oct 2021 04:23 )  .145 ng/mL / x     / 106 U/L / x     / 2.2 ng/mL  CARDIAC MARKERS ( 06 Oct 2021 21:51 )  .149 ng/mL / x     / x     / x     / x        Procalcitonin, Serum: 0.14 ng/mL (10-07-21 @ 04:23)    SARS-CoV-2: NotDetec (10-06-21 @ 21:49)  Rapid RVP Result: NotDetec (10-06-21 @ 21:49)    RECENT CULTURES:  10-06 @ 21:49      NotDetec    All imaging and other data have been reviewed.  < from: CT Chest No Cont (10.07.21 @ 00:03) >  IMPRESSION:  Multilobar pneumonia.  Mild cardiomegaly.  No small bowel obstruction or active bowel inflammation.    Assessment and Plan:   77 yo man with PMH of HTN, DM2, CAD, HepC, CKD and afib was admitted from group [facility] with subjective fevers, SOB, weakness, and brief episodes of palpitations, and urinary frequency for 3 days.  No leukocytosis or documented fever  CT with Multilobar pneumonia  Procalcitonin 0.14     1- Multilobar pneumonia   - Blood culture x 2 has been sent will follow results   - UA negative   - Send MRSA PCR  - Send legionella U ag  - Continue Zosyn 3.375gm q8h   - Add azithromycin 500mg daily     Thank you for courtesy of this consult.     Will follow.  Discussed with the primary team.     Suzie Garner MD  Division of Infectious Diseases   Cell [phone] between 8am and 6pm   After 6pm and weekends please call ID service at [phone].

## 2021-10-07 NOTE — H&P ADULT - PROBLEM SELECTOR PLAN 5
Deferred to Pediatrician's Office
Acute ARIELLA on CKD  - Baseline creatinine Cr 1.6 to 2.6  - On admission unknown 2.0-2.3   - Monitor BMP  - Avoid nephrotoxic medications  - Monitor renal indices  - Consider nephrology consult for worsening renal function

## 2021-10-07 NOTE — CONSULT NOTE ADULT - ASSESSMENT
CKD 4  Diabetes  Pneumonia  Hypertension  SLE  Hypokalemia CKD 4  Diabetes  Pneumonia  Hypertension  SLE  Hypokalemia  Anemia    Renal indices at baseline. Check cultures, IV abx. Monitor blood sugar levels. Insulin coverage as needed. Dietary restriction.   Monitor BP trend. Titrate BP meds as needed. Salt restriction. Potassium supplementation. Monitor h/h trend.   Further recommendations pending clinical course. Thank you for the courtesy of this referral.

## 2021-10-07 NOTE — DISCHARGE NOTE PROVIDER - HOSPITAL COURSE
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 Pt states he would intermittently feel chills and feverish, recorded no temps at group home since last couple of days, he states progressively worsening of dyspnea on exertion and rest, patient unable to walk short distances or climb stairs due to dyspnea, however denies orthopnea, cough, sputum production, night sweats. Patient mentioned some dizziness associated with lower extremities weakness, usually ambulates independently but now feels unable to hold his weight. Regarding palpitations, pt states for 3 days his heart would skip a beat, return to normal. Self limited episodes under a minute, no associated chest pain. Pt also describes urinary frequency beyond baseline, denies dysuria or incontinence, hematuria, constipation.     ED course:   VS: Afebrile, HR: 80 BP: 220/100->235/116-> 189/85, Spo2: 98 on NC RR: 20  Labs significant for low stable hemoglobin, BUN/creatinine: 36/2.30, Trop 0.149, Pro BMP 8061.   EKG on admission showed Afib @ 64 bpm, incompleta right BBB, LVH  CT head shows no  evidence of acute intracranial hemorrhage, midline shift or CT evidence of acute territorial infarct.  CT chest A/P demonstrated Multilobar pneumonia. Mild cardiomegaly. No mall bowel obstruction or active bowel inflammation.  Patient was given in the ED 10 mg IVP hydralazine 10 mg Labetalol IVP 1x, 2000 cc bolus NS 1x      (07 Oct 2021 01:24)      ---  HOSPITAL COURSE:   ID- Dr Garner consulted for multilobar pneumonia; pt treated with antibiotic including IV zosyn + azithromycin. Blood culture grew _____; urine culture grew ______. Cardiology Dr Bliss consulted for atrial fibrillation. Pt home medications for A-fib continued, placed on tele monitoring and home med Eliquis, labetalol and digoxin continued. Pt remained in NSR. Hypertensive urgency on presentation was managed by adjusting home BP meds????????? and BP has remained stable. Elevated troponins 2/2 to demand ischemia have trended down. Hypokalemia during hospital course replete dprn. Dr Rodarte- Nephrology; ARIELLA on presentation improved and renal functioning is back to pt baseline???? CT scan of the thyroid revealed multiple heterogenous thyroid nodules b/l; TSH wnl. Pt will f/u with outpatient Endocrinologist/PCP. Dr Day consulted for pt remote hx of ELMER.     ---  CONSULTANTS:   ID- Dr Garner   Nephro- Dr Rodarte   Cardio- Dr Cooper Koenig- Dr Day     ---  TIME SPENT:  The total amount of time spent reviewing the hospital notes, laboratory values, imaging findings, assessing/counseling the patient, discussing with consultant physicians, social work, nursing staff was -- minutes    ---  Primary care provider was made aware of plan for discharge:      [  ] NO     [  ] YES   FROM ADMISSION H+P:   HPI:  77 yo M with PMHx of Type 2 DM (not on insulin), BPH, CAD s/p stents >4 years ago (not on plavix), diverticulitis (hospitalized 07/2020 at [facility]), GIB 2/2 diverticulosis (2020), anxiety, Lupus, HTN, HLD, CKD stage 3, HepC, 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 Pt states he would intermittently feel chills and feverish, recorded no temps at group home since last couple of days, he states progressively worsening of dyspnea on exertion and rest, patient unable to walk short distances or climb stairs due to dyspnea, however denies orthopnea, cough, sputum production, night sweats. Patient mentioned some dizziness associated with lower extremities weakness, usually ambulates independently but now feels unable to hold his weight. Regarding palpitations, pt states for 3 days his heart would skip a beat, return to normal. Self limited episodes under a minute, no associated chest pain. Pt also describes urinary frequency beyond baseline, denies dysuria or incontinence, hematuria, constipation.     ED course:   VS: Afebrile, HR: 80 BP: 220/100->235/116-> 189/85, Spo2: 98 on NC RR: 20  Labs significant for low stable hemoglobin, BUN/creatinine: 36/2.30, Trop 0.149, Pro BMP 8061.   EKG on admission showed Afib @ 64 bpm, incompleta right BBB, LVH  CT head shows no  evidence of acute intracranial hemorrhage, midline shift or CT evidence of acute territorial infarct.  CT chest A/P demonstrated Multilobar pneumonia. Mild cardiomegaly. No mall bowel obstruction or active bowel inflammation.  Patient was given in the ED 10 mg IVP hydralazine 10 mg Labetalol IVP 1x, 2000 cc bolus NS 1x      (07 Oct 2021 01:24)      ---  HOSPITAL COURSE:   ID- Dr Garner consulted for multilobar pneumonia; pt treated with antibiotic including IV zosyn + azithromycin. Blood culture NGTD; urine culture grew normal jadon. Cardiology Dr Bliss consulted for atrial fibrillation. Pt home medications for A-fib continued, placed on tele monitoring and home med Eliquis, labetalol and digoxin continued. Pt remained in NSR. Hypertensive urgency on presentation was managed by adjusting home BP meds and BP has remained stable. Elevated troponins 2/2 to demand ischemia have trended down. Hypokalemia during hospital course replete dprn. Dr Rodarte- Nephrology; ARIELLA on presentation improved and renal functioning is back to pt baseline. CT scan of the thyroid revealed multiple heterogenous thyroid nodules b/l; TSH wnl. Pt will f/u with outpatient Endocrinologist/PCP. Dr Day consulted for pt remote hx of ELMER. Dr. Rob- Heme/Onc; performed anemia workup; C3, C4, dsDNA, albumin/globulin ratio, quantitative IgG WNL. Patient was started on Vitamin B12 and Folate until levels were repleted. Iron studies resulted in iron deficiency; and patient was started on IV iron therapy    Patient was medically optimized and improved clinically throughout hospital course. Patient seen and examined on day of discharge. Patient is medically stable for discharge to_______.     ---  CONSULTANTS:   ID- Dr Garner   Nephro- Dr Rodarte   Cardio- Dr Cooper Koenig- Dr Barb Yan/Onc- Dr Rob     ---  TIME SPENT:  The total amount of time spent reviewing the hospital notes, laboratory values, imaging findings, assessing/counseling the patient, discussing with consultant physicians, social work, nursing staff was -- minutes    ---  Primary care provider was made aware of plan for discharge:      [  ] NO     [  ] YES   FROM ADMISSION H+P:   HPI:  75 yo M with PMHx of Type 2 DM (not on insulin), BPH, CAD s/p stents >4 years ago (not on plavix), diverticulitis (hospitalized 07/2020 at [facility]), GIB 2/2 diverticulosis (2020), anxiety, Lupus, HTN, HLD, CKD stage 3, HepC, 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 Pt states he would intermittently feel chills and feverish, recorded no temps at group home since last couple of days, he states progressively worsening of dyspnea on exertion and rest, patient unable to walk short distances or climb stairs due to dyspnea, however denies orthopnea, cough, sputum production, night sweats. Patient mentioned some dizziness associated with lower extremities weakness, usually ambulates independently but now feels unable to hold his weight. Regarding palpitations, pt states for 3 days his heart would skip a beat, return to normal. Self limited episodes under a minute, no associated chest pain. Pt also describes urinary frequency beyond baseline, denies dysuria or incontinence, hematuria, constipation.         ED course:   VS: Afebrile, HR: 80 BP: 220/100->235/116-> 189/85, Spo2: 98 on NC RR: 20  Labs significant for low stable hemoglobin, BUN/creatinine: 36/2.30, Trop 0.149, Pro BMP 8061.   EKG on admission showed Afib @ 64 bpm, incompleta right BBB, LVH  CT head shows no  evidence of acute intracranial hemorrhage, midline shift or CT evidence of acute territorial infarct.  CT chest A/P demonstrated Multilobar pneumonia. Mild cardiomegaly. No mall bowel obstruction or active bowel inflammation.  Patient was given in the ED 10 mg IVP hydralazine 10 mg Labetalol IVP 1x, 2000 cc bolus NS 1x      (07 Oct 2021 01:24)      ---  HOSPITAL COURSE:   ID- Dr Garner consulted for multilobar pneumonia; pt treated with antibiotic including IV zosyn + azithromycin. Blood culture NGTD; urine culture grew normal jadon. Cardiology Dr Bliss consulted for atrial fibrillation. Pt home medications for A-fib continued, placed on tele monitoring and home med Eliquis, labetalol and digoxin continued. Pt remained in NSR. Hypertensive urgency on presentation was managed by adjusting home BP meds and BP has remained stable. Elevated troponins 2/2 to demand ischemia have trended down. Hypokalemia during hospital course replete dprn. Dr Rodarte- Nephrology; ARIELLA on presentation improved and renal functioning is back to pt baseline. CT scan of the thyroid revealed multiple heterogenous thyroid nodules b/l; TSH wnl. Pt will f/u with outpatient Endocrinologist/PCP. Dr Day consulted for pt remote hx of ELMER. Dr. Rob- Heme/Onc; performed anemia workup; C3, C4, dsDNA, albumin/globulin ratio, quantitative IgG WNL. Patient was started on Vitamin B12 and Folate until levels were repleted. Iron studies resulted in iron deficiency; and patient was started on IV iron therapy. Psych Dr. Winkler was consulted to evaluate patients decision making capacity. As per Dr. Winkler patient _______.     Patient was medically optimized and improved clinically throughout hospital course. Patient seen and examined on day of discharge. Patient is medically stable for discharge to_______.     ---  CONSULTANTS:   ID- Dr Garner   Nephro- Dr Rodarte   Cardio- Dr Cooper Koenig- Dr Barb Yan/Onc- Dr Rob     ---  TIME SPENT:  The total amount of time spent reviewing the hospital notes, laboratory values, imaging findings, assessing/counseling the patient, discussing with consultant physicians, social work, nursing staff was -- minutes    ---  Primary care provider was made aware of plan for discharge:      [  ] NO     [  ] YES   FROM ADMISSION H+P:   HPI:  77 yo M with PMHx of Type 2 DM (not on insulin), BPH, CAD s/p stents >4 years ago (not on plavix), diverticulitis (hospitalized 07/2020 at [facility]), GIB 2/2 diverticulosis (2020), anxiety, Lupus, HTN, HLD, CKD stage 3, HepC, 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 Pt states he would intermittently feel chills and feverish, recorded no temps at group home since last couple of days, he states progressively worsening of dyspnea on exertion and rest, patient unable to walk short distances or climb stairs due to dyspnea, however denies orthopnea, cough, sputum production, night sweats. Patient mentioned some dizziness associated with lower extremities weakness, usually ambulates independently but now feels unable to hold his weight. Regarding palpitations, pt states for 3 days his heart would skip a beat, return to normal. Self limited episodes under a minute, no associated chest pain. Pt also describes urinary frequency beyond baseline, denies dysuria or incontinence, hematuria, constipation.         ED course:   VS: Afebrile, HR: 80 BP: 220/100->235/116-> 189/85, Spo2: 98 on NC RR: 20  Labs significant for low stable hemoglobin, BUN/creatinine: 36/2.30, Trop 0.149, Pro BMP 8061.   EKG on admission showed Afib @ 64 bpm, incompleta right BBB, LVH  CT head shows no  evidence of acute intracranial hemorrhage, midline shift or CT evidence of acute territorial infarct.  CT chest A/P demonstrated Multilobar pneumonia. Mild cardiomegaly. No mall bowel obstruction or active bowel inflammation.  Patient was given in the ED 10 mg IVP hydralazine 10 mg Labetalol IVP 1x, 2000 cc bolus NS 1x      (07 Oct 2021 01:24)      ---  HOSPITAL COURSE:   ID- Dr Garner consulted for multilobar pneumonia; pt treated with antibiotic including IV zosyn + azithromycin and switched to Augmentin. Blood culture NGTD; urine culture grew normal jadon. Cardiology Dr Bliss consulted for atrial fibrillation. Pt home medications for A-fib continued, placed on tele monitoring and home med Eliquis, labetalol and digoxin continued. Pt remained in NSR. Hypertensive urgency on presentation was managed by adjusting home BP meds and BP has remained stable. Elevated troponins 2/2 to demand ischemia have trended down. Repeat echo was performed and resulted_____. Hypokalemia during hospital course replete dprn. Dr Rodarte- Nephrology; ARIELLA on presentation improved and renal functioning is back to pt baseline. CT scan of the thyroid revealed multiple heterogenous thyroid nodules b/l; TSH wnl. Pt will f/u with outpatient Endocrinologist/PCP. Dr Day consulted for pt remote hx of ELMER. Dr. Rob- Heme/Onc; performed anemia workup; C3, C4, dsDNA, albumin/globulin ratio, quantitative IgG WNL. Patient was started on Vitamin B12 and Folate until levels were repleted. Iron studies resulted in iron deficiency; and patient was started on IV iron therapy.     Patient was medically optimized and improved clinically throughout hospital course. Patient seen and examined on day of discharge. Patient is medically stable for discharge to_______.     ---  CONSULTANTS:   ID- Dr Garner   Nephcindy- Dr Rodarte   Cardio- Dr Cooper Koenig- Dr Barb Yan/Onc- Dr Rob     ---  TIME SPENT:  The total amount of time spent reviewing the hospital notes, laboratory values, imaging findings, assessing/counseling the patient, discussing with consultant physicians, social work, nursing staff was -- minutes    ---  Primary care provider was made aware of plan for discharge:      [  ] NO     [  ] YES   FROM ADMISSION H+P:   HPI:  75 yo M with PMHx of Type 2 DM (not on insulin), BPH, CAD s/p stents >4 years ago (not on plavix), diverticulitis (hospitalized 07/2020 at [facility]), GIB 2/2 diverticulosis (2020), anxiety, Lupus, HTN, HLD, CKD stage 3, HepC, 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 Pt states he would intermittently feel chills and feverish, recorded no temps at group home since last couple of days, he states progressively worsening of dyspnea on exertion and rest, patient unable to walk short distances or climb stairs due to dyspnea, however denies orthopnea, cough, sputum production, night sweats. Patient mentioned some dizziness associated with lower extremities weakness, usually ambulates independently but now feels unable to hold his weight. Regarding palpitations, pt states for 3 days his heart would skip a beat, return to normal. Self limited episodes under a minute, no associated chest pain. Pt also describes urinary frequency beyond baseline, denies dysuria or incontinence, hematuria, constipation.         ED course:   VS: Afebrile, HR: 80 BP: 220/100->235/116-> 189/85, Spo2: 98 on NC RR: 20  Labs significant for low stable hemoglobin, BUN/creatinine: 36/2.30, Trop 0.149, Pro BMP 8061.   EKG on admission showed Afib @ 64 bpm, incompleta right BBB, LVH  CT head shows no  evidence of acute intracranial hemorrhage, midline shift or CT evidence of acute territorial infarct.  CT chest A/P demonstrated Multilobar pneumonia. Mild cardiomegaly. No mall bowel obstruction or active bowel inflammation.  Patient was given in the ED 10 mg IVP hydralazine 10 mg Labetalol IVP 1x, 2000 cc bolus NS 1x      (07 Oct 2021 01:24)      ---  HOSPITAL COURSE:   ID- Dr Garner consulted for multilobar pneumonia; pt treated with antibiotic including IV zosyn + azithromycin and switched to Augmentin. Blood culture NGTD; urine culture grew normal jadon. Cardiology Dr Bliss consulted for atrial fibrillation. Pt home medications for A-fib continued, placed on tele monitoring and home med Eliquis, labetalol and digoxin continued. Pt remained in NSR. Hypertensive urgency on presentation was managed by adjusting home BP meds and BP has remained stable. Elevated troponins 2/2 to demand ischemia have trended down. Repeat echo was performed and resulted EF 55%-60% and mild mr, mild as, and mild tr. Hypokalemia during hospital course replete dprn. Dr Rodarte- Nephrology; ARIELLA on presentation improved and renal functioning is back to pt baseline. CT scan of the thyroid revealed multiple heterogenous thyroid nodules b/l; TSH wnl. Pt will f/u with outpatient Endocrinologist/PCP. Dr Day consulted for pt remote hx of ELMER. Dr. Rob- Heme/Onc; performed anemia workup; C3, C4, dsDNA, albumin/globulin ratio, quantitative IgG WNL. Patient was started on Vitamin B12 and Folate until levels were repleted. Iron studies resulted in iron deficiency; and patient was started on IV iron therapy.     Patient was medically optimized and improved clinically throughout hospital course. Patient seen and examined on day of discharge. Patient is medically stable for discharge to home care facility.     Vital Signs Last 24 Hrs  T(C): 36.7 (14 Oct 2021 05:27), Max: 36.7 (13 Oct 2021 11:37)  T(F): 98.1 (14 Oct 2021 05:27), Max: 98.1 (14 Oct 2021 05:27)  HR: 89 (14 Oct 2021 05:27) (67 - 89)  BP: 167/76 (14 Oct 2021 05:27) (166/81 - 182/86)  BP(mean): --  RR: 18 (14 Oct 2021 05:27) (15 - 18)  SpO2: 94% (14 Oct 2021 05:27) (94% - 96%)    GENERAL:  [ x ] NAD , [x  ] well appearing, [  ] Agitated, [  ] Drowsy,  [  ] Lethargy, [  ] confused   HEAD:  [ x ] Normal, [  ] Other  EYES:  [x  ] EOMI, [  ] PERRLA, [ x ] conjunctiva and sclera clear normal, [  ] Other,  [  ] Pallor,[  ] Discharge  ENMT:  [ x ] Normal, [  ] Moist mucous membranes, [  ] Good dentition, [  ] No Thrush  NECK:  [ x ] Supple, [ x ] No JVD, [  ] Normal thyroid, [  ] Lymphadenopathy [  ] Other  CHEST/LUNG:  x[  ] Clear to auscultation bilaterally, [ x ] Breath Sounds equal B/L [  ] poor effort  [ x ] No rales, [ x ] No rhonchi  [ x ]  No wheezing,   HEART:  [x  ] Regular rate and rhythm, [  ] tachycardia, [  ] Bradycardia,  [  ] irregular  [x ] No murmurs, No rubs, No gallops, [  ] PPM in place (Mfr:  )  ABDOMEN:  [x  ] Soft, [ x ] Nontender, [x  ] Nondistended, [  ] No mass, [  ] Bowel sounds present, [  ] obese  NERVOUS SYSTEM:  [ x ] Alert & Oriented X3, [  ] Nonfocal  [  ] Confusion  [  ] Encephalopathic [  ] Sedated [  ] Unable to assess, [  ] Dementia [  ] Other-  EXTREMITIES: [ x ] 2+ Peripheral Pulses, No clubbing, No cyanosis,  [  ] edema B/L lower EXT. [  ] PVD stasis skin changes B/L Lower EXT, [  ] wound  LYMPH: No lymphadenopathy noted  SKIN:  [ x ] No rashes or lesions, [  ] Pressure Ulcers, [  ] ecchymosis, [  ] Skin Tears, [  ]     ---  CONSULTANTS:   ID- Dr Garner   Nephro- Dr Rodarte   Cardio- Dr Cooper Koenig- Dr Day   Heme/Onc- Dr Rob   Endocrine- Dr. Perlman       ---  TIME SPENT:  The total amount of time spent reviewing the hospital notes, laboratory values, imaging findings, assessing/counseling the patient, discussing with consultant physicians, social work, nursing staff was -- minutes    ---  Primary care provider was made aware of plan for discharge:      [  ] NO     [  ] YES   FROM ADMISSION H+P:   HPI:  77 yo M with PMHx of Type 2 DM (not on insulin), BPH, CAD s/p stents >4 years ago (not on plavix), diverticulitis (hospitalized 07/2020 at [facility]), GIB 2/2 diverticulosis (2020), anxiety, Lupus, HTN, HLD, CKD stage 3, HepC, 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 Pt states he would intermittently feel chills and feverish, recorded no temps at group home since last couple of days, he states progressively worsening of dyspnea on exertion and rest, patient unable to walk short distances or climb stairs due to dyspnea, however denies orthopnea, cough, sputum production, night sweats. Patient mentioned some dizziness associated with lower extremities weakness, usually ambulates independently but now feels unable to hold his weight. Regarding palpitations, pt states for 3 days his heart would skip a beat, return to normal. Self limited episodes under a minute, no associated chest pain. Pt also describes urinary frequency beyond baseline, denies dysuria or incontinence, hematuria, constipation.         ED course:   VS: Afebrile, HR: 80 BP: 220/100->235/116-> 189/85, Spo2: 98 on NC RR: 20  Labs significant for low stable hemoglobin, BUN/creatinine: 36/2.30, Trop 0.149, Pro BMP 8061.   EKG on admission showed Afib @ 64 bpm, incompleta right BBB, LVH  CT head shows no  evidence of acute intracranial hemorrhage, midline shift or CT evidence of acute territorial infarct.  CT chest A/P demonstrated Multilobar pneumonia. Mild cardiomegaly. No mall bowel obstruction or active bowel inflammation.  Patient was given in the ED 10 mg IVP hydralazine 10 mg Labetalol IVP 1x, 2000 cc bolus NS 1x      (07 Oct 2021 01:24)      ---  HOSPITAL COURSE:   ID- Dr Garner consulted for multilobar pneumonia; pt treated with antibiotic including IV zosyn + azithromycin and switched to Augmentin. Blood culture NGTD; urine culture grew normal jadon. Cardiology Dr Bliss consulted for atrial fibrillation. Pt home medications for A-fib continued, placed on tele monitoring and home med Eliquis, labetalol and digoxin continued. Pt remained in NSR. Hypertensive urgency on presentation was managed by adjusting home BP meds and BP has remained stable. Elevated troponins 2/2 to demand ischemia have trended down. Repeat echo was performed and resulted EF 55%-60% and mild mr, mild as, and mild tr. Hypokalemia during hospital course replete dprn. Dr Rodarte- Nephrology; ARIELLA on presentation improved and renal functioning is back to pt baseline. CT scan of the thyroid revealed multiple heterogenous thyroid nodules b/l; TSH wnl. Pt will f/u with outpatient Endocrinologist/PCP. Dr Day consulted for pt remote hx of ELMER. Dr. Rob- Heme/Onc; performed anemia workup; C3, C4, dsDNA, albumin/globulin ratio, quantitative IgG WNL. Patient was started on Vitamin B12 and Folate until levels were repleted. Iron studies resulted in iron deficiency; and patient was started on IV iron therapy.     Patient was medically optimized and improved clinically throughout hospital course. Patient seen and examined on day of discharge. Patient is medically stable for discharge to home care facility.     CONSULTANTS:   ID- Dr Garner   Nephro- Dr Rodarte   Cardio- Dr Cooper Koenig- Dr Barb Yan/Onc- Dr Rob   Endocrine- Dr. Perlman       ---  TIME SPENT:  The total amount of time spent reviewing the hospital notes, laboratory values, imaging findings, assessing/counseling the patient, discussing with consultant physicians, social work, nursing staff was 60 minutes    -

## 2021-10-07 NOTE — H&P ADULT - NSHPPOAPULMEMBOLUS_GEN_A_CORE
Patient states that at LOV she discussed with MD about her breast problem and  informed patient to monitor and if sx's got worse she would refer her to a breast center. Patient says sx's have gotten worse and wants to see the specialist MD mentioned. Please advise. Patient is aware MD not in office today.   no

## 2021-10-07 NOTE — PROGRESS NOTE ADULT - ASSESSMENT
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 Patient admitted for hypertensive urgency, Afib and PNA.     #Multilobar PNA  - Admit to telemetry unit   - On admission does not meet sepsis criteria; afebrile, WBC WNL, normal lactate   - RIVP negative on admission   - CT Chest showed branching nodular opacities and patchy airspace opacities noted in the lingula and bilateral lower lobes which are likely infectious in etiology. No pleural effusions are present.   - Given 1g Rocephin and Azithromycin and IVF 2100 mL NS bolus in the ED   -  Now on Zosyn 3.375gm q8h + azithromycin 500mg daily    - F/U BCx, UCx, legionella strep urine antigens  - F/up procalcitonin   - ID consulted Dr. Garner, recs appreciated     # Afib   - Patient presents with new onset Afib, however pt's PCP prescribed digoxin, Eliquis by PCP on sept 14/21  - Admit to telemetry  - Given in the ED labetalol 10 mg IVP in th ED   - EKG on admission showed Afib @ 64 bpm, incomplete right BBB, LVH   - Last TTE on 7/21/21 demonstrate normal left ventricular systolic function, estimated LVEF of 60%.LV diastolic dysfunction.  - On digoxin, Eliquis labetalol, will continue   - Will continue with Eliquis 5mg BID   - Monitor lytes and replete as needed  - Continuous cardiac monitoring  - Cardiology ([facility]) consulted, f/u recs- increase labetalol and hydralazine    #Hypertensive Urgency  - On admission /100  - Of note patient recently admitted with hypertensive urgency on 07/2021  - s/p 10mg IV hydralazine, 10mg IV labetalol in the ED   - On clonidine 0.2 TID, hydralazine 50mg TID, labetalol 100 mg BID, amlodipine 10 mg qd   - Will continue with home medications with hold parameters   - DASH/ TLC diet   - Continue to monitor BP   - Cardiology ([facility]) consulted, f/u recs- increase labetalol and hydralazine    # Elevated troponin, possible demand ischemia   - Troponin stable  0.149 --> 0.145  - EKG on admission showed Afib @ 64 bpm, incompleta right BBB, LVH   - Will trend cardiac enzymes q6 (third at 2pma)  - Cardiology consulted- Dr Ruthy bermeo    # CAD s/p  PCI with stents  - Elevated troponin in setting of hypertensive urgency  - On ASA and fenofibrate,, will continue   - Monitor and replete lytes, keep K>4, Mg>2.    # Diastolic Heart failure  -Patient presenting with worsening dyspnea on rest and exertion   - On physical exam mild volume overload   - Last TTE on 07/21  Last TTE on 7/21/21 demonstrate normal left ventricular systolic function, estimated LVEF of 60%.LV diastolic dysfunction.  - On admission Pro BNP 8061 elevated compare to previous one on July 4140  - s/p IVF 2 bolus in the ED   - On Lasix 20 mg BID, will continue w/ renal function monitor   - Strict I&O's     # Acute ARIELLA on CKD  Cr on presentation of 2.3  - Baseline creatinine Cr 1.6 to 2.6  - Cr improving; 2.0 today   - Monitor BMP  - Avoid nephrotoxic medications  - Monitor renal indices  - Nephro consulted- Dr Rodarte     # BPH   - Chronic, patient reports increased in urinary frequency   - On oxybutynin 5 mg q6h will continue     # Anemia   - H/H 10.1/32 on admission, baseline hemoglobin 8.0-9.0  - Currently hemodynamically stable, monitor for signs and symptoms of active bleeding  - Iron studies on 07/21 demonstrated iron deficiency anemia   - F/u AM CBC    # Depression    - Chronic   - On Mirtazapine, venlafaxin, Lamictal, will continue     # History of T2DM   - last A1C on 07/21 6.1  - Off of meds   - Insulin sliding scale  - Accu checks w/ hypoglycemic protocol     # Abnormal CT finding   -CT chest on admission showed Multiple heterogeneous thyroid nodules noted bilaterally  - TSH wnl  - Recommended to follow up outpatient     # Need for prophylaxis measures   DVT ppx: improve score 1. Continue home Eliquis 5mg BID   IMPROVE VTE Individual Risk Assessment  RISK                                                                Points  [  ] Previous VTE                                                  3  [  ] Thrombophilia                                               2  [  ] Lower limb paralysis                                      2        (unable to hold up >15 seconds)    [  ] Current Cancer                                              2         (within 6 months)  [  ] Immobilization > 24 hrs                                1  [  ] ICU/CCU stay > 24 hours                              1  [x  ] Age > 60                                                      1  IMPROVE VTE Score _______1__       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 Patient admitted for hypertensive urgency, Afib and PNA.     #Multilobar PNA  - Admit to telemetry unit   - On admission does not meet sepsis criteria; afebrile, WBC WNL, normal lactate   - RIVP negative on admission   - CT Chest showed branching nodular opacities and patchy airspace opacities noted in the lingula and bilateral lower lobes which are likely infectious in etiology. No pleural effusions are present.   - Given 1g Rocephin and Azithromycin and IVF 2100 mL NS bolus in the ED   -  Now on Zosyn 3.375gm q8h + azithromycin 500mg daily    - F/U BCx, UCx, legionella strep urine antigens  - procalc mildly elevated- 0.14  - ID consulted Dr. Garner, recs appreciated     # Afib   - Patient presents with new onset Afib, however pt's PCP prescribed digoxin, Eliquis by PCP on sept 14/21  - Admit to telemetry  - Given in the ED labetalol 10 mg IVP in th ED   - EKG on admission showed Afib @ 64 bpm, incomplete right BBB, LVH   - Last TTE on 7/21/21 demonstrate normal left ventricular systolic function, estimated LVEF of 60%.LV diastolic dysfunction.  - On digoxin, Eliquis labetalol, will continue   - Will continue with Eliquis 5mg BID   - Monitor lytes and replete as needed  - Continuous cardiac monitoring  - Cardiology ([facility]) consulted, f/u recs- increase labetalol and hydralazine    #Hypertensive Urgency  - On admission /100  - Of note patient recently admitted with hypertensive urgency on 07/2021  - s/p 10mg IV hydralazine, 10mg IV labetalol in the ED   - On clonidine 0.2 TID, hydralazine 50mg TID, labetalol 100 mg BID, amlodipine 10 mg qd   - Will continue with home medications with hold parameters   - DASH/ TLC diet   - Continue to monitor BP   - Cardiology ([facility]) consulted, f/u recs- increase labetalol and hydralazine    # Elevated troponin, possible demand ischemia   - Troponin stable  0.149 --> 0.145  - EKG on admission showed Afib @ 64 bpm, incompleta right BBB, LVH   - Will trend cardiac enzymes q6 (third at 2pma)  - Cardiology consulted- Dr Ruthy bermeo    # Hypokalemia   - K 3.2 this am   - repleted with 40 po K  - f/u am BMP    # CAD s/p  PCI with stents  - Elevated troponin in setting of hypertensive urgency  - On ASA and fenofibrate,, will continue   - Monitor and replete lytes, keep K>4, Mg>2.    # Diastolic Heart failure  -Patient presenting with worsening dyspnea on rest and exertion   - On physical exam mild volume overload   - Last TTE on 07/21  Last TTE on 7/21/21 demonstrate normal left ventricular systolic function, estimated LVEF of 60%.LV diastolic dysfunction.  - On admission Pro BNP 8061 elevated compare to previous one on July 4140  - s/p IVF 2 bolus in the ED   - On Lasix 20 mg BID, will continue w/ renal function monitor   - Strict I&O's     # Acute ARIELLA on CKD  Cr on presentation of 2.3  - Baseline creatinine Cr 1.6 to 2.6  - Cr improving; 2.0 today   - Monitor BMP  - Avoid nephrotoxic medications  - Monitor renal indices  - Nephro consulted- Dr Rodarte     # BPH   - Chronic, patient reports increased in urinary frequency   - On oxybutynin 5 mg q6h will continue     # Anemia   - H/H 10.1/32 on admission, baseline hemoglobin 8.0-9.0  - Currently hemodynamically stable, monitor for signs and symptoms of active bleeding  - Iron studies on 07/21 demonstrated iron deficiency anemia   - F/u AM CBC    # Depression    - Chronic   - On Mirtazapine, venlafaxin, Lamictal, will continue     # History of T2DM   - last A1C on 07/21 6.1  - Off of meds   - Insulin sliding scale  - Accu checks w/ hypoglycemic protocol     # Abnormal CT finding   -CT chest on admission showed Multiple heterogeneous thyroid nodules noted bilaterally  - TSH wnl  - Recommended to follow up outpatient     # Need for prophylaxis measures   DVT ppx: improve score 1. Continue home Eliquis 5mg BID   IMPROVE VTE Individual Risk Assessment  RISK                                                                Points  [  ] Previous VTE                                                  3  [  ] Thrombophilia                                               2  [  ] Lower limb paralysis                                      2        (unable to hold up >15 seconds)    [  ] Current Cancer                                              2         (within 6 months)  [  ] Immobilization > 24 hrs                                1  [  ] ICU/CCU stay > 24 hours                              1  [x  ] Age > 60                                                      1  IMPROVE VTE Score _______1__       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 Patient admitted for hypertensive urgency, Afib and PNA.     #Multilobar PNA  - Admit to telemetry unit   - On admission does not meet sepsis criteria; afebrile, WBC WNL, normal lactate   - RIVP negative on admission   - CT Chest showed branching nodular opacities and patchy airspace opacities noted in the lingula and bilateral lower lobes which are likely infectious in etiology. No pleural effusions are present.   - Given 1g Rocephin and Azithromycin and IVF 2100 mL NS bolus in the ED   -  Now on Zosyn 3.375gm q8h + azithromycin 500mg daily    - F/U BCx, UCx, legionella strep urine antigens  - procalc mildly elevated- 0.14  - ID consulted Dr. Garner, recs appreciated     # Afib   - Patient presents with new onset Afib, however pt's PCP prescribed digoxin, Eliquis by PCP on sept 14/21  - Admit to telemetry  - Given in the ED labetalol 10 mg IVP in th ED   - EKG on admission showed Afib @ 64 bpm, incomplete right BBB, LVH   - Last TTE on 7/21/21 demonstrate normal left ventricular systolic function, estimated LVEF of 60%.LV diastolic dysfunction.  - On digoxin, Eliquis labetalol, will continue   - Will continue with Eliquis 5mg BID   - Monitor lytes and replete as needed  - Continuous cardiac monitoring  - Cardiology ([facility]) consulted, f/u recs- increase labetalol and hydralazine    #Hypertensive Urgency  - On admission /100; ?? compliance with medication adherence   - Of note patient recently admitted with hypertensive urgency on 07/2021  - s/p 10mg IV hydralazine, 10mg IV labetalol in the ED   - On clonidine 0.2 TID, hydralazine 50mg TID, labetalol 100 mg BID, amlodipine 10 mg qd   - Will continue with home medications with hold parameters   - DASH/ TLC diet   - Continue to monitor BP   - Cardiology ([facility]) consulted, f/u recs- increase labetalol and hydralazine as needed.     # Elevated troponin, possible demand ischemia   - Troponin stable  0.149 --> 0.145  - EKG on admission showed Afib @ 64 bpm, incompleta right BBB, LVH   - Will trend cardiac enzymes q6 (third at 2pma)  - Cardiology consulted- Dr Ruthy bermeo    # Hypokalemia   - K 3.2 this am   - repleted with 40 po K  - f/u am BMP    # CAD s/p  PCI with stents  - Elevated troponin in setting of hypertensive urgency  - On ASA and fenofibrate,, will continue   - Monitor and replete lytes, keep K>4, Mg>2.    # Diastolic Heart failure  -Patient presenting with worsening dyspnea on rest and exertion   - On physical exam mild volume overload   - Last TTE on 07/21  Last TTE on 7/21/21 demonstrate normal left ventricular systolic function, estimated LVEF of 60%.LV diastolic dysfunction.  - On admission Pro BNP 8061 elevated compare to previous one on July 4140  - s/p IVF 2 bolus in the ED   - On Lasix 20 mg BID, will continue w/ renal function monitor   - Strict I&O's     # Acute ARIELLA on CKD  Cr on presentation of 2.3  - Baseline creatinine Cr 1.6 to 2.6  - Cr improving; 2.0 today   - Monitor BMP  - Avoid nephrotoxic medications  - Monitor renal indices  - Nephro consulted- Dr Rodarte     # BPH   - Chronic, patient reports increased in urinary frequency   - On oxybutynin 5 mg q6h will continue     # Anemia   - H/H 10.1/32 on admission, baseline hemoglobin 8.0-9.0  - Currently hemodynamically stable, monitor for signs and symptoms of active bleeding  - Iron studies on 07/21 demonstrated iron deficiency anemia   - F/u AM CBC    # Depression    - Chronic   - On Mirtazapine, venlafaxin, Lamictal, will continue     # History of T2DM   - last A1C on 07/21 6.1  - Off of meds   - Insulin sliding scale  - Accu checks w/ hypoglycemic protocol     # Abnormal CT finding   -CT chest on admission showed Multiple heterogeneous thyroid nodules noted bilaterally  - TSH wnl  - Recommended to follow up outpatient     # Need for prophylaxis measures   DVT ppx: improve score 1. Continue home Eliquis 5mg BID   IMPROVE VTE Individual Risk Assessment  RISK                                                                Points  [  ] Previous VTE                                                  3  [  ] Thrombophilia                                               2  [  ] Lower limb paralysis                                      2        (unable to hold up >15 seconds)    [  ] Current Cancer                                              2         (within 6 months)  [  ] Immobilization > 24 hrs                                1  [  ] ICU/CCU stay > 24 hours                              1  [x  ] Age > 60                                                      1  IMPROVE VTE Score _______1__       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 Patient admitted for hypertensive urgency, Afib and PNA.     #Multilobar PNA  - Admit to telemetry unit   - On admission does not meet sepsis criteria; afebrile, WBC WNL, normal lactate   - RIVP negative on admission   - CT Chest showed branching nodular opacities and patchy airspace opacities noted in the lingula and bilateral lower lobes which are likely infectious in etiology. No pleural effusions are present.   - Given 1g Rocephin and Azithromycin and IVF 2100 mL NS bolus in the ED   -  Now on Zosyn 3.375gm q8h + azithromycin 500mg daily    - F/U BCx, UCx, legionella strep urine antigens  - procalc mildly elevated- 0.14  - ID consulted Dr. Garner, recs appreciated   - Pulm- Dr Day consulted     # Afib   - Patient presents with new onset Afib, however pt's PCP prescribed digoxin, Eliquis by PCP on sept 14/21  - Admit to telemetry  - Given in the ED labetalol 10 mg IVP in th ED   - EKG on admission showed Afib @ 64 bpm, incomplete right BBB, LVH   - Last TTE on 7/21/21 demonstrate normal left ventricular systolic function, estimated LVEF of 60%.LV diastolic dysfunction.  - On digoxin, Eliquis labetalol, will continue   - Will continue with Eliquis 5mg BID   - Monitor lytes and replete as needed  - Continuous cardiac monitoring  - Cardiology ([facility]) consulted, f/u recs- increase labetalol and hydralazine    #Hypertensive Urgency  - On admission /100; ?? compliance with medication adherence   - Of note patient recently admitted with hypertensive urgency on 07/2021  - s/p 10mg IV hydralazine, 10mg IV labetalol in the ED   - On clonidine 0.2 TID, hydralazine 50mg TID, labetalol 100 mg BID, amlodipine 10 mg qd   - Will continue with home medications with hold parameters   - DASH/ TLC diet   - Continue to monitor BP   - Cardiology ([facility]) consulted, f/u recs- increase labetalol and hydralazine as needed.     # Elevated troponin, possible demand ischemia   - Troponin stable  0.149 --> 0.145  - EKG on admission showed Afib @ 64 bpm, incompleta right BBB, LVH   - Will trend cardiac enzymes q6 (third at 2pma)  - Cardiology consulted- Dr Ruthy bermeo    # Hypokalemia   - K 3.2 this am   - repleted with 40 po K  - f/u am BMP    # CAD s/p  PCI with stents  - Elevated troponin in setting of hypertensive urgency  - On ASA and fenofibrate,, will continue   - Monitor and replete lytes, keep K>4, Mg>2.    # Diastolic Heart failure  -Patient presenting with worsening dyspnea on rest and exertion   - On physical exam mild volume overload   - Last TTE on 07/21  Last TTE on 7/21/21 demonstrate normal left ventricular systolic function, estimated LVEF of 60%.LV diastolic dysfunction.  - On admission Pro BNP 8061 elevated compare to previous one on July 4140  - s/p IVF 2 bolus in the ED   - On Lasix 20 mg BID, will continue w/ renal function monitor   - Strict I&O's     # Acute ARIELLA on CKD  Cr on presentation of 2.3  - Baseline creatinine Cr 1.6 to 2.6  - Cr improving; 2.0 today   - Monitor BMP  - Avoid nephrotoxic medications  - Monitor renal indices  - Nephro consulted- Dr Rodarte     #ELMER   -Pt admits to remote history of ELMER with CPAP usage   -Pulm- Dr Day consulted     # BPH   - Chronic, patient reports increased in urinary frequency   - On oxybutynin 5 mg q6h will continue     # Anemia   - H/H 10.1/32 on admission, baseline hemoglobin 8.0-9.0  - Currently hemodynamically stable, monitor for signs and symptoms of active bleeding  - Iron studies on 07/21 demonstrated iron deficiency anemia   - F/u AM CBC    # Depression    - Chronic   - On Mirtazapine, venlafaxin, Lamictal, will continue     # History of T2DM   - last A1C on 07/21 6.1  - Off of meds   - Insulin sliding scale  - Accu checks w/ hypoglycemic protocol     # Abnormal CT finding   -CT chest on admission showed Multiple heterogeneous thyroid nodules noted bilaterally  - TSH wnl  - Recommended to follow up outpatient     # Need for prophylaxis measures   DVT ppx: improve score 1. Continue home Eliquis 5mg BID   IMPROVE VTE Individual Risk Assessment  RISK                                                                Points  [  ] Previous VTE                                                  3  [  ] Thrombophilia                                               2  [  ] Lower limb paralysis                                      2        (unable to hold up >15 seconds)    [  ] Current Cancer                                              2         (within 6 months)  [  ] Immobilization > 24 hrs                                1  [  ] ICU/CCU stay > 24 hours                              1  [x  ] Age > 60                                                      1  IMPROVE VTE Score _______1__       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 Patient admitted for hypertensive urgency, Afib and PNA.

## 2021-10-07 NOTE — PROGRESS NOTE ADULT - PROBLEM SELECTOR PLAN 11
History of T2DM   - last A1C on 07/21 6.1  - Off of meds   - Insulin sliding scale  - Accu checks w/ hypoglycemic protocol 1 person at [place], 1 Person at ModA/2 person assist/verbal cues

## 2021-10-07 NOTE — DISCHARGE NOTE PROVIDER - NSDCACTIVITY_GEN_ALL_CORE
Bathing allowed Bathing allowed/Do not drive or operate machinery/Showering allowed/Walking - Indoors allowed/No heavy lifting/straining

## 2021-10-07 NOTE — CONSULT NOTE ADULT - SUBJECTIVE AND OBJECTIVE BOX
Date/Time Patient Seen:  		  Referring MD:   Data Reviewed	       Patient is a 76y old  Male who presents with a chief complaint of multilobar PNA, afib (07 Oct 2021 15:57)      Subjective/HPI    in bed  seen and examined  vs and meds reviewed  ct chest reviewed  labs reviewed  H and P reviewed  ER provider note reviewed     Hospital Course:  Admission Date	07-Oct-2021 01:36  Reason for Admission	multilobar PNA, afib  Hospital Course	  FROM ADMISSION H+P:   HPI:  77 yo M with PMHx of Type 2 DM (not on insulin), BPH, CAD s/p stents >4 years ago (not on plavix), diverticulitis (hospitalized 07/2020 at [facility]), GIB 2/2 diverticulosis (2020), anxiety, Lupus, HTN, HLD, CKD stage 3, HepC, 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 Pt states he would intermittently feel chills and feverish, recorded no temps at group home since last couple of days, he states progressively worsening of dyspnea on exertion and rest, patient unable to walk short distances or climb stairs due to dyspnea, however denies orthopnea, cough, sputum production, night sweats. Patient mentioned some dizziness associated with lower extremities weakness, usually ambulates independently but now feels unable to hold his weight. Regarding palpitations, pt states for 3 days his heart would skip a beat, return to normal. Self limited episodes under a minute, no associated chest pain. Pt also describes urinary frequency beyond baseline, denies dysuria or incontinence, hematuria, constipation.       FAMILY HISTORY:  FHx: throat cancer  No family history of colorectal cancer.     Social History:  Social History (marital status, living situation, occupation, tobacco use, alcohol and drug use, and sexual history): Tobacco: quit in 1980, not active smoker  EtOH: denies   Recreational drug use: cocaine several years ago "a few times" has not used in "many years"  Lives with: [facility]; group home; no nursing assistance; observation is there  Ambulates: independent, denies walker or cane but uses guard railings  ADLs: independent, but states need for assistance in bathing, cooking; independent with feeding, ambulating  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[place]  Vaccinations: Moderna x2 doses last dose in May     Tobacco Screening:  · Core Measure Site	Yes  · Has the patient used tobacco in the past 30 days?	No    Risk Assessment:    Present on Admission:  Deep Venous Thrombosis	no  Pulmonary Embolus	no  Urinary Catheter	no  Central Venous Catheter/PICC Line	no  Surgical Site Incision	no  Pressure Ulcer(s)	no     Heart Failure:  Does this patient have a history of or has been diagnosed with heart failure? yes.     LV Function Assessment (LVS function was evaluated before arrival and/or during hospitalization) no.     PAST MEDICAL & SURGICAL HISTORY:  Hypertension    Diabetes mellitus    Lupus    Hepatitis C    Anxiety and depression    CAD (coronary artery disease)  s/p stents    Diverticulosis    Hyperlipidemia    HTN (hypertension)  c/b multiple episodes of hypertensive urgency    HLD (hyperlipidemia)    Atrial fibrillation  first documented on EKG 10/7/2021    CAD (coronary artery disease)  s/p stents (not on plavix)    Type 2 diabetes mellitus  not on home insulin    Anxiety  multiple psych medications    History of diverticulitis  07/2021    Diverticulosis  c/b GIB in 2020    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  Had surgery ( April 2013 )    S/P tonsillectomy    S/P arthroscopic knee surgery  Bilateral ( 2005 )    Torsion of testicle  Had surgery at age 14    Pilonidal cyst  Had surgery ( 1969 )    S/P cataract surgery  Bilateral    H/O hernia repair          Medication list         MEDICATIONS  (STANDING):  amLODIPine   Tablet 10 milliGRAM(s) Oral daily  apixaban 5 milliGRAM(s) Oral every 12 hours  ARIPiprazole 30 milliGRAM(s) Oral daily  aspirin enteric coated 81 milliGRAM(s) Oral daily  azithromycin   Tablet 500 milliGRAM(s) Oral daily  budesonide 160 MICROgram(s)/formoterol 4.5 MICROgram(s) Inhaler 2 Puff(s) Inhalation two times a day  cloNIDine 0.2 milliGRAM(s) Oral three times a day  dextrose 40% Gel 15 Gram(s) Oral once  dextrose 5%. 1000 milliLiter(s) (50 mL/Hr) IV Continuous <Continuous>  dextrose 50% Injectable 25 Gram(s) IV Push once  doxazosin 4 milliGRAM(s) Oral <User Schedule>  famotidine    Tablet 20 milliGRAM(s) Oral every 48 hours  fenofibrate Tablet 145 milliGRAM(s) Oral daily  furosemide    Tablet 20 milliGRAM(s) Oral two times a day  glucagon  Injectable 1 milliGRAM(s) IntraMuscular once  hydrALAZINE 50 milliGRAM(s) Oral every 8 hours  insulin lispro (ADMELOG) corrective regimen sliding scale   SubCutaneous three times a day before meals  labetalol 100 milliGRAM(s) Oral two times a day  lamoTRIgine 100 milliGRAM(s) Oral daily  mirtazapine 30 milliGRAM(s) Oral at bedtime  nystatin Powder 1 Application(s) Topical two times a day  oxybutynin 5 milliGRAM(s) Oral four times a day  pantoprazole    Tablet 40 milliGRAM(s) Oral before breakfast  piperacillin/tazobactam IVPB.. 3.375 Gram(s) IV Intermittent every 8 hours  venlafaxine XR. 150 milliGRAM(s) Oral daily    MEDICATIONS  (PRN):  acetaminophen   Tablet .. 650 milliGRAM(s) Oral every 6 hours PRN Temp greater or equal to 38C (100.4F), Mild Pain (1 - 3)  melatonin 3 milliGRAM(s) Oral at bedtime PRN Insomnia  ondansetron Injectable 4 milliGRAM(s) IV Push every 8 hours PRN Nausea and/or Vomiting         Vitals log        ICU Vital Signs Last 24 Hrs  T(C): 36.3 (07 Oct 2021 15:33), Max: 37.1 (07 Oct 2021 09:39)  T(F): 97.4 (07 Oct 2021 15:33), Max: 98.8 (07 Oct 2021 09:39)  HR: 66 (07 Oct 2021 15:33) (45 - 80)  BP: 155/79 (07 Oct 2021 15:33) (155/79 - 235/116)  BP(mean): --  ABP: --  ABP(mean): --  RR: 19 (07 Oct 2021 15:33) (16 - 20)  SpO2: 95% (07 Oct 2021 15:33) (95% - 100%)           Input and Output:  I&O's Detail      Lab Data                        9.8    8.57  )-----------( 272      ( 07 Oct 2021 04:23 )             32.2     10-07    144  |  109<H>  |  32<H>  ----------------------------<  138<H>  3.2<L>   |  25  |  2.00<H>    Ca    9.4      07 Oct 2021 04:23  Phos  4.2     10-07  Mg     2.0     10-07    TPro  7.1  /  Alb  3.1<L>  /  TBili  0.3  /  DBili  x   /  AST  19  /  ALT  24  /  AlkPhos  39<L>  10-07      CARDIAC MARKERS ( 07 Oct 2021 14:20 )  .112 ng/mL / x     / x     / x     / x      CARDIAC MARKERS ( 07 Oct 2021 04:23 )  .145 ng/mL / x     / 106 U/L / x     / 2.2 ng/mL  CARDIAC MARKERS ( 06 Oct 2021 21:51 )  .149 ng/mL / x     / x     / x     / x            Review of Systems	  weakness  cough  sob      Objective     Physical Examination    heart s1s2  lung dec BS  abd soft  head nc      Pertinent Lab findings & Imaging      El:  NO   Adequate UO     I&O's Detail           Discussed with:     Cultures:	        Radiology        EXAM:  CT ABDOMEN AND PELVIS                          EXAM:  CT CHEST                            PROCEDURE DATE:  10/07/2021          INTERPRETATION:  CLINICAL INFORMATION: Shortness of breath. Abdominal pain. Fever.    TECHNIQUE: CT imaging of the chest, abdomen, and pelvis was obtained without IV contrast. MIP images were also obtained.    COMPARISON: 7/19/2021    FINDINGS:    Chest:    Multiple heterogeneous thyroid nodules noted bilaterally which can be better evaluated with ultrasound.    There is no supraclavicular, axillary, mediastinal or hilar lymphadenopathy.    The heart is mildly enlarged. There is no pericardial effusion.    Branching nodular opacities and patchy airspace opacities noted in the lingula and bilateral lower lobes which are likely infectious in etiology. No pleural effusions are present. The tracheobronchial tree is patent.    Abdomen/Pelvis:    The liver, spleen, pancreas, gallbladder and biliary tree are unremarkable.    There is no hydronephrosis. Stable 2.6 cm right renal lesion. The adrenal glands are unremarkable.    There is no bowel obstruction or ascites. Scattered colonic diverticula. The appendix is normal.    The bladder, prostate and seminal vesicles are unremarkable.    There is no abdominal or pelvic lymphadenopathy.    The aorta and IVC are unremarkable.    Degenerative changes of the spine.    IMPRESSION:    Multilobar pneumonia.    Mild cardiomegaly.    No small bowel obstruction or active bowel inflammation.    --- End of Report ---            BRYAN CHAVEZ MD; Attending Radiologist  This document has been electronically signed. Oct  7 2021  1:16AM            IMPRESSION:  Normal left ventricular systolic function, estimated LVEF of 60%.  Grossly normal RV size and systolic function.  Left atrial enlargement  Calcified trileaflet aortic valve with mild aortic stenosis, without AI.  Mild MR  Trace TR.  No significant pericardial effusion.    --- End of Report ---

## 2021-10-07 NOTE — H&P ADULT - GASTROINTESTINAL DETAILS
obese/normal/soft/nontender/no distention/no masses palpable/bowel sounds normal/no bruit/no rebound tenderness/no guarding/no rigidity/no organomegaly

## 2021-10-07 NOTE — H&P ADULT - PROBLEM SELECTOR PLAN 4
possible demand ischemia   - First cardiac enzymes set shows troponin 0.149  - EKG on admission showed Afib @ 64 bpm, incompleta right BBB, LVH   - Will trend cardiac enzymes q6 (second at 6 am)

## 2021-10-07 NOTE — DISCHARGE NOTE PROVIDER - CARE PROVIDERS DIRECT ADDRESSES
,DirectAddress_Unknown,DirectAddress_Unknown ,DirectAddress_Unknown,DirectAddress_Unknown,DirectAddress_Unknown,DirectAddress_Unknown ,DirectAddress_Unknown,DirectAddress_Unknown,DirectAddress_Unknown,DirectAddress_Unknown,DirectAddress_Unknown

## 2021-10-07 NOTE — DISCHARGE NOTE PROVIDER - CARE PROVIDER_API CALL
Erich Man)  Medicine  [address] [place] [address]  Phone: [phone]  Fax: [phone]  Follow Up Time: 1 week    Cooper,   Follow up with your cardiologis Dr Gamboa 1 week after discharge  Phone: (   )    -  Fax: (   )    -  Follow Up Time: 1 week   Erich Man)  Medicine  [address] [place] [address]  Phone: [phone]  Fax: [phone]  Follow Up Time: 1 week    Haseeb Gamboa)  Cardiology  [address] [place] [address], [address]  [place] [address]  Phone: [phone]  Fax: [phone]  Follow Up Time: 1 week   Erich Man)  Medicine  [address] [place] [address]  Phone: [phone]  Fax: [phone]  Follow Up Time: 1 week    Haseeb Gamboa)  Cardiology  [address], [address]  [place] [address]  Phone: [phone]  Fax: [phone]  Follow Up Time: 1 week    Rafat Rodarte  [facility]  [address], [address]  [place] [address]  Phone: [phone]  Fax: [phone]  Follow Up Time:     Isrrael Rodriguez  [facility]  [address], [address]  [place] [address]  Phone: [phone]  Fax: [phone]  Follow Up Time: 1 month   Erich Man)  Medicine  [address] [place] [address]  Phone: [phone]  Fax: [phone]  Follow Up Time: 1 week    Haseeb Gamboa)  Cardiology  [address] [facility], [address]  [place] [address]  Phone: [phone]  Fax: [phone]  Follow Up Time: 1 week    Rafat Rodarte  [facility]  [address], [address]  [place] [address]  Phone: [phone]  Fax: [phone]  Follow Up Time:     Isrrael Rodriguez I  HEMATOLOGY  [address], [address]  [place] [address]  Phone: [phone]  Fax: [phone]  Follow Up Time: 1 month    Perlman, Craig D  [address], [address]  [place] [address]  Phone: [phone]  Fax: [phone]  Follow Up Time:

## 2021-10-07 NOTE — H&P ADULT - NSHPSOCIALHISTORY_GEN_ALL_CORE
Tobacco: quit in 1980, not active smoker  EtOH: denies   Recreational drug use: cocaine several years ago "a few times" has not used in "many years"  Lives with: [facility]; group home; no nursing assistance; observation is there  Ambulates: independent, denies walker or cane but uses guard railings  ADLs: independent, but states need for assistance in bathing, cooking; independent with feeding, ambulating  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[place]  Vaccinations: Moderna x2 doses last dose in May

## 2021-10-07 NOTE — H&P ADULT - PROBLEM SELECTOR PLAN 1
Multilobar PNA  - Admit to telemetry unit   - On admission does not meet sepsis criteria; afebrile, WBC WNL, normal lactate   - RIVP negative on admission   - CT Chest showed branching nodular opacities and patchy airspace opacities noted in the lingula and bilateral lower lobes which are likely infectious in etiology. No pleural effusions are present.   - Given 1g Rocephin and Azithromycin and IVF 2100 mL NS bolus in the ED   - Will start IV zosyn q8hrs   - F/U BCx, UCx, legionella strep urine antigens  - F/up procalcitonin   - ID consulted Dr. Garner, recs appreciated- IV Zosyn q 8 hrs

## 2021-10-07 NOTE — PROGRESS NOTE ADULT - SUBJECTIVE AND OBJECTIVE BOX
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 Pt states he would intermittently feel chills and feverish, recorded no temps at group home since last couple of days, he states progressively worsening of dyspnea on exertion and rest, patient unable to walk short distances or climb stairs due to dyspnea, however denies orthopnea, cough, sputum production, night sweats. Patient mentioned some dizziness associated with lower extremities weakness, usually ambulates independently but now feels unable to hold his weight. Regarding palpitations, pt states for 3 days his heart would skip a beat, return to normal. Self limited episodes under a minute, no associated chest pain. Pt also describes urinary frequency beyond baseline, denies dysuria or incontinence, hematuria, constipation.     ED course:   VS: Afebrile, HR: 80 BP: 220/100->235/116-> 189/85, Spo2: 98 on NC RR: 20  Labs significant for low stable hemoglobin, BUN/creatinine: 36/2.30, Trop 0.149, Pro BMP 8061.   EKG on admission showed Afib @ 64 bpm, incompleta right BBB, LVH  CT head shows no  evidence of acute intracranial hemorrhage, midline shift or CT evidence of acute territorial infarct.  CT chest A/P demonstrated Multilobar pneumonia. Mild cardiomegaly. No mall bowel obstruction or active bowel inflammation.  Patient was given in the ED 10 mg IVP hydralazine 10 mg Labetalol IVP 1x, 2000 cc bolus NS 1x            (07 Oct 2021 01:24)        SUBJECTIVE:      ALLERGIES:  Allergies    No Known Allergies    Intolerances          MEDICATIONS  (STANDING):  amLODIPine   Tablet 10 milliGRAM(s) Oral daily  apixaban 5 milliGRAM(s) Oral every 12 hours  ARIPiprazole 30 milliGRAM(s) Oral daily  aspirin enteric coated 81 milliGRAM(s) Oral daily  azithromycin   Tablet 500 milliGRAM(s) Oral daily  budesonide 160 MICROgram(s)/formoterol 4.5 MICROgram(s) Inhaler 2 Puff(s) Inhalation two times a day  cloNIDine 0.2 milliGRAM(s) Oral three times a day  dextrose 40% Gel 15 Gram(s) Oral once  dextrose 5%. 1000 milliLiter(s) (50 mL/Hr) IV Continuous <Continuous>  dextrose 50% Injectable 25 Gram(s) IV Push once  doxazosin 4 milliGRAM(s) Oral <User Schedule>  famotidine    Tablet 20 milliGRAM(s) Oral every 48 hours  fenofibrate Tablet 145 milliGRAM(s) Oral daily  furosemide    Tablet 20 milliGRAM(s) Oral two times a day  glucagon  Injectable 1 milliGRAM(s) IntraMuscular once  hydrALAZINE 50 milliGRAM(s) Oral every 8 hours  insulin lispro (ADMELOG) corrective regimen sliding scale   SubCutaneous three times a day before meals  labetalol 100 milliGRAM(s) Oral two times a day  lamoTRIgine 100 milliGRAM(s) Oral daily  mirtazapine 30 milliGRAM(s) Oral at bedtime  nystatin Powder 1 Application(s) Topical two times a day  oxybutynin 5 milliGRAM(s) Oral four times a day  pantoprazole    Tablet 40 milliGRAM(s) Oral before breakfast  piperacillin/tazobactam IVPB.. 3.375 Gram(s) IV Intermittent every 8 hours  venlafaxine XR. 150 milliGRAM(s) Oral daily    MEDICATIONS  (PRN):  acetaminophen   Tablet .. 650 milliGRAM(s) Oral every 6 hours PRN Temp greater or equal to 38C (100.4F), Mild Pain (1 - 3)  melatonin 3 milliGRAM(s) Oral at bedtime PRN Insomnia  ondansetron Injectable 4 milliGRAM(s) IV Push every 8 hours PRN Nausea and/or Vomiting      REVIEW OF SYSTEMS:  CONSTITUTIONAL: No fever,  RESPIRATORY: No cough, wheezing, shortness of breath  CARDIOVASCULAR: No chest pain, dyspnea, palpitations, dizziness, syncope, paroxysmal nocturnal dyspnea, orthopnea, or arm or leg swelling  GASTROINTESTINAL: No abdominal  or epigastric pain, nausea, vomiting,  diarrhea  NEUROLOGICAL: No headaches,  loss of strength, numbness, or tremors    Vital Signs Last 24 Hrs  T(C): 36.3 (07 Oct 2021 15:33), Max: 37.1 (07 Oct 2021 09:39)  T(F): 97.4 (07 Oct 2021 15:33), Max: 98.8 (07 Oct 2021 09:39)  HR: 66 (07 Oct 2021 15:33) (45 - 75)  BP: 155/79 (07 Oct 2021 15:33) (155/79 - 235/116)  BP(mean): --  RR: 19 (07 Oct 2021 15:33) (16 - 20)  SpO2: 95% (07 Oct 2021 15:33) (95% - 100%)    PHYSICAL EXAM:  HEAD:  Atraumatic, Normocephalic  NECK: Supple and normal thyroid.  No JVD or carotid bruit.   HEART: S1, S2 regular , 1/6 soft ejection systolic murmur in mitral area , no thrill and no gallops .  PULMONARY: Bilateral vesicular breathing , few scattered ronchi and few scattered rales are present .  ABDOMEN: Soft nontender and positive bowl sounds   EXTREMITIES:  No clubbing, cyanosis, or pedal  edema  NEUROLOGICAL: AAOX3 , no focal deficit .    LABS:                        9.8    8.57  )-----------( 272      ( 07 Oct 2021 04:23 )             32.2     10-07    144  |  109<H>  |  32<H>  ----------------------------<  138<H>  3.2<L>   |  25  |  2.00<H>    Ca    9.4      07 Oct 2021 04:23  Phos  4.2     10-07  Mg     2.0     10-07    TPro  7.1  /  Alb  3.1<L>  /  TBili  0.3  /  DBili  x   /  AST  19  /  ALT  24  /  AlkPhos  39<L>  10-07    CARDIAC MARKERS ( 07 Oct 2021 14:20 )  .112 ng/mL / x     / x     / x     / x      CARDIAC MARKERS ( 07 Oct 2021 04:23 )  .145 ng/mL / x     / 106 U/L / x     / 2.2 ng/mL  CARDIAC MARKERS ( 06 Oct 2021 21:51 )  .149 ng/mL / x     / x     / x     / x          PT/INR - ( 06 Oct 2021 21:49 )   PT: 13.9 sec;   INR: 1.20 ratio         PTT - ( 06 Oct 2021 21:49 )  PTT:36.3 sec  Urinalysis Basic - ( 06 Oct 2021 21:49 )    Color: Yellow / Appearance: Clear / S.020 / pH: x  Gluc: x / Ketone: Negative  / Bili: Negative / Urobili: Negative   Blood: x / Protein: 500 mg/dL / Nitrite: Negative   Leuk Esterase: Negative / RBC: 3-5 /HPF / WBC 0-2   Sq Epi: x / Non Sq Epi: Occasional / Bacteria: Occasional      BNPSerum Pro-Brain Natriuretic Peptide: 7438 pg/mL (10-07 @ 04:23)  Serum Pro-Brain Natriuretic Peptide: 8061 pg/mL (10-06 @ 21:49)        EKG:  ECHO:  IMAGING:    Assessment/Plan    Will continue to follow during hospital course and give further recommendations as needed . Thanks for your referral . 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 Pt states he would intermittently feel chills and feverish, recorded no temps at group home since last couple of days, he states progressively worsening of dyspnea on exertion and rest, patient unable to walk short distances or climb stairs due to dyspnea, however denies orthopnea, cough, sputum production, night sweats. Patient mentioned some dizziness associated with lower extremities weakness, usually ambulates independently but now feels unable to hold his weight. Regarding palpitations, pt states for 3 days his heart would skip a beat, return to normal. Self limited episodes under a minute, no associated chest pain. Pt also describes urinary frequency beyond baseline, denies dysuria or incontinence, hematuria, constipation.     ED course:   VS: Afebrile, HR: 80 BP: 220/100->235/116-> 189/85, Spo2: 98 on NC RR: 20  Labs significant for low stable hemoglobin, BUN/creatinine: 36/2.30, Trop 0.149, Pro BMP 8061.   EKG on admission showed Afib @ 64 bpm, incompleta right BBB, LVH  CT head shows no  evidence of acute intracranial hemorrhage, midline shift or CT evidence of acute territorial infarct.  CT chest A/P demonstrated Multilobar pneumonia. Mild cardiomegaly. No mall bowel obstruction or active bowel inflammation.  Patient was given in the ED 10 mg IVP hydralazine 10 mg Labetalol IVP 1x, 2000 cc bolus NS 1x            (07 Oct 2021 01:24)        SUBJECTIVE:  Patient feeling better .      ALLERGIES:  Allergies    No Known Allergies    Intolerances          MEDICATIONS  (STANDING):  amLODIPine   Tablet 10 milliGRAM(s) Oral daily  apixaban 5 milliGRAM(s) Oral every 12 hours  ARIPiprazole 30 milliGRAM(s) Oral daily  aspirin enteric coated 81 milliGRAM(s) Oral daily  azithromycin   Tablet 500 milliGRAM(s) Oral daily  budesonide 160 MICROgram(s)/formoterol 4.5 MICROgram(s) Inhaler 2 Puff(s) Inhalation two times a day  cloNIDine 0.2 milliGRAM(s) Oral three times a day  dextrose 40% Gel 15 Gram(s) Oral once  dextrose 5%. 1000 milliLiter(s) (50 mL/Hr) IV Continuous <Continuous>  dextrose 50% Injectable 25 Gram(s) IV Push once  doxazosin 4 milliGRAM(s) Oral <User Schedule>  famotidine    Tablet 20 milliGRAM(s) Oral every 48 hours  fenofibrate Tablet 145 milliGRAM(s) Oral daily  furosemide    Tablet 20 milliGRAM(s) Oral two times a day  glucagon  Injectable 1 milliGRAM(s) IntraMuscular once  hydrALAZINE 50 milliGRAM(s) Oral every 8 hours  insulin lispro (ADMELOG) corrective regimen sliding scale   SubCutaneous three times a day before meals  labetalol 100 milliGRAM(s) Oral two times a day  lamoTRIgine 100 milliGRAM(s) Oral daily  mirtazapine 30 milliGRAM(s) Oral at bedtime  nystatin Powder 1 Application(s) Topical two times a day  oxybutynin 5 milliGRAM(s) Oral four times a day  pantoprazole    Tablet 40 milliGRAM(s) Oral before breakfast  piperacillin/tazobactam IVPB.. 3.375 Gram(s) IV Intermittent every 8 hours  venlafaxine XR. 150 milliGRAM(s) Oral daily    MEDICATIONS  (PRN):  acetaminophen   Tablet .. 650 milliGRAM(s) Oral every 6 hours PRN Temp greater or equal to 38C (100.4F), Mild Pain (1 - 3)  melatonin 3 milliGRAM(s) Oral at bedtime PRN Insomnia  ondansetron Injectable 4 milliGRAM(s) IV Push every 8 hours PRN Nausea and/or Vomiting      REVIEW OF SYSTEMS:  CONSTITUTIONAL: No fever,  RESPIRATORY: Improvement in  cough, wheezing, shortness of breath  CARDIOVASCULAR: Improvement in  chest pain and  dyspnea, No  palpitations, dizziness, syncope, paroxysmal nocturnal dyspnea, and improvement in SOB and  leg swelling  GASTROINTESTINAL: No abdominal  or epigastric pain, nausea, vomiting,  diarrhea  NEUROLOGICAL: No headaches,  loss of strength, numbness, or tremors  Skin : No itching .  Hematology : No bleeding .  Endocrinology : No heat and cold intolerance .  Psychiatry : Patient is mild anxious .  Musculoskeletal : Patient has mild arthritis .    Vital Signs Last 24 Hrs  T(C): 36.3 (07 Oct 2021 15:33), Max: 37.1 (07 Oct 2021 09:39)  T(F): 97.4 (07 Oct 2021 15:33), Max: 98.8 (07 Oct 2021 09:39)  HR: 66 (07 Oct 2021 15:33) (45 - 75)  BP: 155/79 (07 Oct 2021 15:33) (155/79 - 235/116)  BP(mean): --  RR: 19 (07 Oct 2021 15:33) (16 - 20)  SpO2: 95% (07 Oct 2021 15:33) (95% - 100%)    PHYSICAL EXAM:  HEAD:  Atraumatic, Normocephalic  NECK: Supple and normal thyroid.  No JVD or carotid bruit.   HEART: S1, S2 irregular , 1/6 soft ejection systolic murmur in mitral area , no thrill and no gallops .  PULMONARY: Bilateral vesicular breathing , few scattered rhonchi and few scattered rales are present .  ABDOMEN: Soft nontender and positive bowl sounds   EXTREMITIES:  No clubbing, cyanosis, or pedal  edema  NEUROLOGICAL: AAOX3 , no focal deficit .    LABS:                        9.8    8.57  )-----------( 272      ( 07 Oct 2021 04:23 )             32.2     10-07    144  |  109<H>  |  32<H>  ----------------------------<  138<H>  3.2<L>   |  25  |  2.00<H>    Ca    9.4      07 Oct 2021 04:23  Phos  4.2     10-07  Mg     2.0     10-07    TPro  7.1  /  Alb  3.1<L>  /  TBili  0.3  /  DBili  x   /  AST  19  /  ALT  24  /  AlkPhos  39<L>  10-07    CARDIAC MARKERS ( 07 Oct 2021 14:20 )  .112 ng/mL / x     / x     / x     / x      CARDIAC MARKERS ( 07 Oct 2021 04:23 )  .145 ng/mL / x     / 106 U/L / x     / 2.2 ng/mL  CARDIAC MARKERS ( 06 Oct 2021 21:51 )  .149 ng/mL / x     / x     / x     / x          PT/INR - ( 06 Oct 2021 21:49 )   PT: 13.9 sec;   INR: 1.20 ratio         PTT - ( 06 Oct 2021 21:49 )  PTT:36.3 sec  Urinalysis Basic - ( 06 Oct 2021 21:49 )    Color: Yellow / Appearance: Clear / S.020 / pH: x  Gluc: x / Ketone: Negative  / Bili: Negative / Urobili: Negative   Blood: x / Protein: 500 mg/dL / Nitrite: Negative   Leuk Esterase: Negative / RBC: 3-5 /HPF / WBC 0-2   Sq Epi: x / Non Sq Epi: Occasional / Bacteria: Occasional      BNPSerum Pro-Brain Natriuretic Peptide: 7438 pg/mL (10-07 @ 04:23)  Serum Pro-Brain Natriuretic Peptide: 8061 pg/mL (10-06 @ 21:49)        Assessment/Plan  Patient has :  1) Atypical chest pain . Pneumonia R/O sepsis .  2) Mild elevated Troponin I secondary to demand ischemia and not ACS or NON-ST KATERINA as primary event .  3) Paroxysmal atrial  fibrillation and stable   4) Chronic mild diastolic heart failure and stable   5) Chronic anemia .  6) Chronic renal insufficiency .  7) Hypertension and BP intermittently mild elevated   Plan : 1) Monitor hemoglobin and electrolytes 2) I/V antibiotics as per PMD . 3) Rest of medications as such . 4) Prognosis guarded 5) Increase labetalol   Will continue to follow during hospital course and give further recommendations as needed . Thanks for your referral . if any questions please contact me at office ([phone])

## 2021-10-07 NOTE — DISCHARGE NOTE PROVIDER - PROVIDER TOKENS
PROVIDER:[TOKEN:[[id number]],FOLLOWUP:[1 week]],FREE:[LAST:[[place]],PHONE:[(   )    -],FAX:[(   )    -],ADDRESS:[Follow up with your cardiologis Dr Gamboa 1 week after discharge],FOLLOWUP:[1 week]] PROVIDER:[TOKEN:[[id number]],FOLLOWUP:[1 week]],PROVIDER:[TOKEN:[[id number]],FOLLOWUP:[1 week]] PROVIDER:[TOKEN:[[id number]],FOLLOWUP:[1 week]],PROVIDER:[TOKEN:[[id number]],FOLLOWUP:[1 week]],PROVIDER:[TOKEN:[[id number]]],PROVIDER:[TOKEN:[[id number]],FOLLOWUP:[1 month]] PROVIDER:[TOKEN:[[id number]],FOLLOWUP:[1 week]],PROVIDER:[TOKEN:[[id number]],FOLLOWUP:[1 week]],PROVIDER:[TOKEN:[[id number]]],PROVIDER:[TOKEN:[[id number]],FOLLOWUP:[1 month]],PROVIDER:[TOKEN:[[id number]]]

## 2021-10-07 NOTE — H&P ADULT - PROBLEM SELECTOR PLAN 9
- Elevated troponin in setting of hypertensive urgency  - On ASA and fenofibrate,, will continue   - Monitor and replete lytes, keep K>4, Mg>2.

## 2021-10-07 NOTE — H&P ADULT - NEGATIVE NEUROLOGICAL SYMPTOMS
no transient paralysis/no paresthesias/no generalized seizures/no focal seizures/no tremors/no vertigo

## 2021-10-07 NOTE — CONSULT NOTE ADULT - ASSESSMENT
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 Patient admitted for hypertensive urgency, Afib and PNA.     pna eval   ID eval noted  biomarkers - cx -   on emp ABX  TTE and CT chest reviewed  LABS reviewed  pt with CKD - Anemia -   monitor VS and Sat - wean o2 as tolerated - keep sat > 88 pct  [facility] rx regimen and BP control - pt with known HTN - AF - CAD -   DM care - serial FS -

## 2021-10-07 NOTE — PROGRESS NOTE ADULT - PROBLEM SELECTOR PLAN 3
Afib   - Patient presents with new onset Afib, however pt's PCP prescribed digoxin, Eliquis by PCP on sept 14/21  - Admit to telemetry  - EKG on admission showed Afib @ 64 bpm, incompleta right BBB, LVH   - Last TTE on 7/21/21 demonstrate normal left ventricular systolic function, estimated LVEF of 60%.LV diastolic dysfunction.  - On , Eliquis labetalol, will continue ?? on  digoxin- follow level   - Will continue with Eliquis 5mg BID   - Monitor lytes and replete as needed  - Continuous cardiac monitoring  - Cardiology Dr Gamboa called by ER H/H 10.1/32 on admission, baseline hemoglobin 8.0-9.0  - Currently hemodynamically stable, likely related to CKD   - Iron studies on 07/21 demonstrated iron deficiency anemia   - F/u AM CBC

## 2021-10-07 NOTE — H&P ADULT - PROBLEM SELECTOR PLAN 6
H/H 10.1/32 on admission, baseline hemoglobin 8.0-9.0  - Currently hemodynamically stable, likely related to CKD   - Iron studies on 07/21 demonstrated iron deficiency anemia   - F/u AM CBC

## 2021-10-07 NOTE — H&P ADULT - NSICDXPASTMEDICALHX_GEN_ALL_CORE_FT
PAST MEDICAL HISTORY:  Anxiety multiple psych medications    Atrial fibrillation first documented on EKG 10/7/2021    CAD (coronary artery disease) s/p stents (not on plavix)    Diverticulosis c/b GIB in 2020    History of diverticulitis 07/2021    HLD (hyperlipidemia)     HTN (hypertension) c/b multiple episodes of hypertensive urgency    Type 2 diabetes mellitus not on home insulin

## 2021-10-07 NOTE — DISCHARGE NOTE PROVIDER - NPI NUMBER (FOR SYSADMIN USE ONLY) :
[[id number]],[[id number]] [[id number]],[[id number] [[id number] [[id number]]

## 2021-10-07 NOTE — CONSULT NOTE ADULT - SUBJECTIVE AND OBJECTIVE BOX
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 Pt states he would intermittently feel chills and feverish, recorded no temps at group home since last couple of days, he states progressively worsening of dyspnea on exertion and rest, patient unable to walk short distances or climb stairs due to dyspnea, however denies orthopnea, cough, sputum production, night sweats. Patient mentioned some dizziness associated with lower extremities weakness, usually ambulates independently but now feels unable to hold his weight. Regarding palpitations, pt states for 3 days his heart would skip a beat, return to normal. Self limited episodes under a minute, no associated chest pain. Pt also describes urinary frequency beyond baseline, denies dysuria or incontinence, hematuria, constipation.     ED course:   VS: Afebrile, HR: 80 BP: 220/100->235/116-> 189/85, Spo2: 98 on NC RR: 20  Labs significant for low stable hemoglobin, BUN/creatinine: 36/2.30, Trop 0.149, Pro BMP 8061.   EKG on admission showed Afib @ 64 bpm, incompleta right BBB, LVH  CT head shows no  evidence of acute intracranial hemorrhage, midline shift or CT evidence of acute territorial infarct.  CT chest A/P demonstrated Multilobar pneumonia. Mild cardiomegaly. No mall bowel obstruction or active bowel inflammation.  Patient was given in the ED 10 mg IVP hydralazine 10 mg Labetalol IVP 1x, 2000 cc bolus NS 1x            (07 Oct 2021 01:24)      PAST MEDICAL HISTORY:  Hypertension    Diabetes mellitus    Lupus    Hepatitis C    Anxiety and depression    CAD (coronary artery disease)    Diverticulosis    Hyperlipidemia    HTN (hypertension)    HLD (hyperlipidemia)    Atrial fibrillation    CAD (coronary artery disease)    Type 2 diabetes mellitus    Anxiety    History of diverticulitis    Diverticulosis        PAST SURGICAL HISTORY:  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    S/P tonsillectomy    S/P arthroscopic knee surgery    Torsion of testicle    Pilonidal cyst    S/P cataract surgery    H/O hernia repair        FAMILY HISTORY:  FHx: throat cancer    No family history of colorectal cancer        SOCIAL HISTORY: No smoking or alcohol use     Allergies    No Known Allergies    Intolerances      Home Medications:  cloNIDine 0.1 mg oral tablet: 2 tab(s) orally 3 times a day (07 Oct 2021 03:41)  digoxin 125 mcg (0.125 mg) oral tablet: 1 tab(s) orally once a day (07 Oct 2021 03:41)  Eliquis 5 mg oral tablet: 1 tab(s) orally 2 times a day (07 Oct 2021 03:41)  famotidine 40 mg oral tablet: 1 tab(s) orally once a day (at bedtime) (07 Oct 2021 03:41)  fenofibrate 145 mg oral tablet: 1 tab(s) orally once a day (07 Oct 2021 03:41)  labetalol 100 mg oral tablet: 1 tab(s) orally 2 times a day (07 Oct 2021 03:41)  Lasix 20 mg oral tablet: 1 tab(s) orally 2 times a day (07 Oct 2021 03:41)  oxybutynin 5 mg oral tablet: 1 tab(s) orally 4 times a day (07 Oct 2021 03:41)  pantoprazole 40 mg oral delayed release tablet: 1 tab(s) orally once a day (before a meal) (07 Oct 2021 03:41)  Symbicort 160 mcg-4.5 mcg/inh inhalation aerosol: 2 puff(s) inhaled 2 times a day (07 Oct 2021 03:41)    MEDICATIONS  (STANDING):  amLODIPine   Tablet 10 milliGRAM(s) Oral daily  apixaban 5 milliGRAM(s) Oral every 12 hours  ARIPiprazole 30 milliGRAM(s) Oral daily  aspirin enteric coated 81 milliGRAM(s) Oral daily  budesonide 160 MICROgram(s)/formoterol 4.5 MICROgram(s) Inhaler 2 Puff(s) Inhalation two times a day  cloNIDine 0.2 milliGRAM(s) Oral three times a day  dextrose 40% Gel 15 Gram(s) Oral once  dextrose 5%. 1000 milliLiter(s) (50 mL/Hr) IV Continuous <Continuous>  dextrose 50% Injectable 25 Gram(s) IV Push once  doxazosin 4 milliGRAM(s) Oral <User Schedule>  famotidine    Tablet 20 milliGRAM(s) Oral every 48 hours  fenofibrate Tablet 145 milliGRAM(s) Oral daily  furosemide    Tablet 20 milliGRAM(s) Oral two times a day  glucagon  Injectable 1 milliGRAM(s) IntraMuscular once  hydrALAZINE 50 milliGRAM(s) Oral every 8 hours  insulin lispro (ADMELOG) corrective regimen sliding scale   SubCutaneous three times a day before meals  labetalol 100 milliGRAM(s) Oral two times a day  lamoTRIgine 100 milliGRAM(s) Oral daily  mirtazapine 30 milliGRAM(s) Oral at bedtime  oxybutynin 5 milliGRAM(s) Oral four times a day  pantoprazole    Tablet 40 milliGRAM(s) Oral before breakfast  piperacillin/tazobactam IVPB.. 3.375 Gram(s) IV Intermittent every 8 hours  venlafaxine XR. 150 milliGRAM(s) Oral daily    MEDICATIONS  (PRN):  acetaminophen   Tablet .. 650 milliGRAM(s) Oral every 6 hours PRN Temp greater or equal to 38C (100.4F), Mild Pain (1 - 3)  melatonin 3 milliGRAM(s) Oral at bedtime PRN Insomnia  ondansetron Injectable 4 milliGRAM(s) IV Push every 8 hours PRN Nausea and/or Vomiting      REVIEW OF SYSTEMS:  General:   Respiratory: No cough, SOB  Cardiovascular: No CP or Palpitations	  Gastrointestinal: No nausea, Vomiting. No diarrhea  Genitourinary: No urinary complaints	  Musculoskeletal: No leg swelling, No new rash or lesions	  Neurological: 	  all other systems negative    T(F): 98.1 (10-06-21 @ 21:02), Max: 98.1 (10-06-21 @ 21:02)  HR: 75 (10-07-21 @ 06:54) (45 - 80)  BP: 158/71 (10-07-21 @ 06:54) (158/71 - 235/116)  RR: 17 (10-07-21 @ 06:54) (16 - 20)  SpO2: 100% (10-07-21 @ 06:54) (96% - 100%)  Wt(kg): --    PHYSICAL EXAM:  General: NAD  Respiratory: b/l air entry  Cardiovascular: S1 S2  Gastrointestinal: soft  Extremities: edema        10-07    144  |  109<H>  |  32<H>  ----------------------------<  138<H>  3.2<L>   |  25  |  2.00<H>    Ca    9.4      07 Oct 2021 04:23  Phos  4.2     10-07  Mg     2.0     10-07    TPro  7.1  /  Alb  3.1<L>  /  TBili  0.3  /  DBili  x   /  AST  19  /  ALT  24  /  AlkPhos  39<L>  10-07                          9.8    8.57  )-----------( 272      ( 07 Oct 2021 04:23 )             32.2       Hemoglobin: 9.8 g/dL (10-07 @ 04:23)  Hematocrit: 32.2 % (10-07 @ 04:23)  Potassium, Serum: 3.2 mmol/L (10-07 @ 04:23)  Blood Urea Nitrogen, Serum: 32 mg/dL (10-07 @ 04:23)      Creatinine, Serum: 2.00 (10-07 @ 04:23)  Creatinine, Serum: 2.30 (10-06 @ 21:49)      Urinalysis Basic - ( 06 Oct 2021 21:49 )    Color: Yellow / Appearance: Clear / S.020 / pH: x  Gluc: x / Ketone: Negative  / Bili: Negative / Urobili: Negative   Blood: x / Protein: 500 mg/dL / Nitrite: Negative   Leuk Esterase: Negative / RBC: 3-5 /HPF / WBC 0-2   Sq Epi: x / Non Sq Epi: Occasional / Bacteria: Occasional      LIVER FUNCTIONS - ( 07 Oct 2021 04:23 )  Alb: 3.1 g/dL / Pro: 7.1 g/dL / ALK PHOS: 39 U/L / ALT: 24 U/L / AST: 19 U/L / GGT: x           CARDIAC MARKERS ( 07 Oct 2021 04:23 )  .145 ng/mL / x     / 106 U/L / x     / 2.2 ng/mL  CARDIAC MARKERS ( 06 Oct 2021 21:51 )  .149 ng/mL / x     / x     / x     / x          Creatine Kinase, Serum: 106 U/L (10-07-21 @ 04:23)          I&O's Detail           Patient is a 76y old  Male who presents with a chief complaint of multilobar PNA, afib (07 Oct 2021 01:24)    HPI:  75 yo M with PMHx of Type 2 DM (not on insulin), BPH, CAD s/p stents >4 years ago (not on plavix), diverticulitis (hospitalized 2020 at [facility]), GIB 2/2 diverticulosis (), anxiety, Lupus, HTN, HLD, CKD stage 3, HepC, 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 to [facility] today with multiple concerns including subjective fevers, SOB, weakness, and brief episodes of palpitations, and urinary frequency "for at least 3 days". Pt states he would intermittently feel chills and feverish, recorded no temps at group home since last couple of days, he states progressively worsening of dyspnea on exertion and rest, patient unable to walk short distances or climb stairs due to dyspnea, however denies orthopnea, cough, sputum production, night sweats. Patient mentioned some dizziness associated with lower extremities weakness, usually ambulates independently but now feels unable to hold his weight. Regarding palpitations, pt states for 3 days his heart would skip a beat, return to normal. Self limited episodes under a minute, no associated chest pain. Pt also describes urinary frequency beyond baseline, denies dysuria or incontinence, hematuria, constipation.     ED course:   VS: Afebrile, HR: 80 BP: 220/100->235/116-> 189/85, Spo2: 98 on NC RR: 20  Labs significant for low stable hemoglobin, BUN/creatinine: 36/2.30, Trop 0.149, Pro BMP 8061.   EKG on admission showed Afib @ 64 bpm, incompleta right BBB, LVH  CT head shows no  evidence of acute intracranial hemorrhage, midline shift or CT evidence of acute territorial infarct.  CT chest A/P demonstrated Multilobar pneumonia. Mild cardiomegaly. No mall bowel obstruction or active bowel inflammation.  Patient was given in the ED 10 mg IVP hydralazine 10 mg Labetalol IVP 1x, 2000 cc bolus NS 1x  (07 Oct 2021 01:24)    Renal consult called for chronic kidney disease stage 4. History obtained from chart and patient.       PAST MEDICAL HISTORY:  Hypertension    Diabetes mellitus    Lupus    Hepatitis C    Anxiety and depression    CAD (coronary artery disease)    Diverticulosis    Hyperlipidemia    HTN (hypertension)    HLD (hyperlipidemia)    Atrial fibrillation    CAD (coronary artery disease)    Type 2 diabetes mellitus    Anxiety    History of diverticulitis    Diverticulosis        PAST SURGICAL HISTORY:  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    S/P tonsillectomy    S/P arthroscopic knee surgery    Torsion of testicle    Pilonidal cyst    S/P cataract surgery    H/O hernia repair        FAMILY HISTORY:  FHx: throat cancer    No family history of colorectal cancer        SOCIAL HISTORY: No smoking or alcohol use     Allergies    No Known Allergies    Intolerances      Home Medications:  cloNIDine 0.1 mg oral tablet: 2 tab(s) orally 3 times a day (07 Oct 2021 03:41)  digoxin 125 mcg (0.125 mg) oral tablet: 1 tab(s) orally once a day (07 Oct 2021 03:41)  Eliquis 5 mg oral tablet: 1 tab(s) orally 2 times a day (07 Oct 2021 03:41)  famotidine 40 mg oral tablet: 1 tab(s) orally once a day (at bedtime) (07 Oct 2021 03:41)  fenofibrate 145 mg oral tablet: 1 tab(s) orally once a day (07 Oct 2021 03:41)  labetalol 100 mg oral tablet: 1 tab(s) orally 2 times a day (07 Oct 2021 03:41)  Lasix 20 mg oral tablet: 1 tab(s) orally 2 times a day (07 Oct 2021 03:41)  oxybutynin 5 mg oral tablet: 1 tab(s) orally 4 times a day (07 Oct 2021 03:41)  pantoprazole 40 mg oral delayed release tablet: 1 tab(s) orally once a day (before a meal) (07 Oct 2021 03:41)  Symbicort 160 mcg-4.5 mcg/inh inhalation aerosol: 2 puff(s) inhaled 2 times a day (07 Oct 2021 03:41)    MEDICATIONS  (STANDING):  amLODIPine   Tablet 10 milliGRAM(s) Oral daily  apixaban 5 milliGRAM(s) Oral every 12 hours  ARIPiprazole 30 milliGRAM(s) Oral daily  aspirin enteric coated 81 milliGRAM(s) Oral daily  budesonide 160 MICROgram(s)/formoterol 4.5 MICROgram(s) Inhaler 2 Puff(s) Inhalation two times a day  cloNIDine 0.2 milliGRAM(s) Oral three times a day  dextrose 40% Gel 15 Gram(s) Oral once  dextrose 5%. 1000 milliLiter(s) (50 mL/Hr) IV Continuous <Continuous>  dextrose 50% Injectable 25 Gram(s) IV Push once  doxazosin 4 milliGRAM(s) Oral <User Schedule>  famotidine    Tablet 20 milliGRAM(s) Oral every 48 hours  fenofibrate Tablet 145 milliGRAM(s) Oral daily  furosemide    Tablet 20 milliGRAM(s) Oral two times a day  glucagon  Injectable 1 milliGRAM(s) IntraMuscular once  hydrALAZINE 50 milliGRAM(s) Oral every 8 hours  insulin lispro (ADMELOG) corrective regimen sliding scale   SubCutaneous three times a day before meals  labetalol 100 milliGRAM(s) Oral two times a day  lamoTRIgine 100 milliGRAM(s) Oral daily  mirtazapine 30 milliGRAM(s) Oral at bedtime  oxybutynin 5 milliGRAM(s) Oral four times a day  pantoprazole    Tablet 40 milliGRAM(s) Oral before breakfast  piperacillin/tazobactam IVPB.. 3.375 Gram(s) IV Intermittent every 8 hours  venlafaxine XR. 150 milliGRAM(s) Oral daily    MEDICATIONS  (PRN):  acetaminophen   Tablet .. 650 milliGRAM(s) Oral every 6 hours PRN Temp greater or equal to 38C (100.4F), Mild Pain (1 - 3)  melatonin 3 milliGRAM(s) Oral at bedtime PRN Insomnia  ondansetron Injectable 4 milliGRAM(s) IV Push every 8 hours PRN Nausea and/or Vomiting      REVIEW OF SYSTEMS:  General: weak  Respiratory: No cough, SOB  Cardiovascular: No CP or Palpitations	  Gastrointestinal: No nausea, Vomiting. No diarrhea  Genitourinary: No urinary complaints	  Musculoskeletal: No new rash or lesions	  all other systems negative    T(F): 98.1 (10-06-21 @ 21:02), Max: 98.1 (10-06-21 @ 21:02)  HR: 75 (10-07-21 @ 06:54) (45 - 80)  BP: 158/71 (10-07-21 @ 06:54) (158/71 - 235/116)  RR: 17 (10-07-21 @ 06:54) (16 - 20)  SpO2: 100% (10-07-21 @ 06:54) (96% - 100%)  Wt(kg): --    PHYSICAL EXAM:  General: NAD  Respiratory: b/l air entry  Cardiovascular: S1 S2  Gastrointestinal: soft  Extremities: no edema        10-07    144  |  109<H>  |  32<H>  ----------------------------<  138<H>  3.2<L>   |  25  |  2.00<H>    Ca    9.4      07 Oct 2021 04:23  Phos  4.2     10-07  Mg     2.0     10-07    TPro  7.1  /  Alb  3.1<L>  /  TBili  0.3  /  DBili  x   /  AST  19  /  ALT  24  /  AlkPhos  39<L>  10-07                          9.8    8.57  )-----------( 272      ( 07 Oct 2021 04:23 )             32.2       Hemoglobin: 9.8 g/dL (10-07 @ 04:23)  Hematocrit: 32.2 % (10-07 @ 04:23)  Potassium, Serum: 3.2 mmol/L (10-07 @ 04:23)  Blood Urea Nitrogen, Serum: 32 mg/dL (10-07 @ 04:23)      Creatinine, Serum: 2.00 (10-07 @ 04:23)  Creatinine, Serum: 2.30 (10-06 @ 21:49)      Urinalysis Basic - ( 06 Oct 2021 21:49 )    Color: Yellow / Appearance: Clear / S.020 / pH: x  Gluc: x / Ketone: Negative  / Bili: Negative / Urobili: Negative   Blood: x / Protein: 500 mg/dL / Nitrite: Negative   Leuk Esterase: Negative / RBC: 3-5 /HPF / WBC 0-2   Sq Epi: x / Non Sq Epi: Occasional / Bacteria: Occasional      LIVER FUNCTIONS - ( 07 Oct 2021 04:23 )  Alb: 3.1 g/dL / Pro: 7.1 g/dL / ALK PHOS: 39 U/L / ALT: 24 U/L / AST: 19 U/L / GGT: x           CARDIAC MARKERS ( 07 Oct 2021 04:23 )  .145 ng/mL / x     / 106 U/L / x     / 2.2 ng/mL  CARDIAC MARKERS ( 06 Oct 2021 21:51 )  .149 ng/mL / x     / x     / x     / x          Creatine Kinase, Serum: 106 U/L (10-07-21 @ 04:23)          I&O's Detail    < from: CT Chest No Cont (10.07.21 @ 00:03) >    EXAM:  CT ABDOMEN AND PELVIS                          EXAM:  CT CHEST                            PROCEDURE DATE:  10/07/2021          INTERPRETATION:  CLINICAL INFORMATION: Shortness of breath. Abdominal pain. Fever.    TECHNIQUE: CT imaging of thechest, abdomen, and pelvis was obtained without IV contrast. MIP images were also obtained.    COMPARISON: 2021    FINDINGS:    Chest:    Multiple heterogeneous thyroid nodules noted bilaterally which can be better evaluated with ultrasound.    There is no supraclavicular, axillary, mediastinal or hilar lymphadenopathy.    The heart is mildly enlarged. There is no pericardial effusion.    Branching nodular opacities and patchy airspace opacities noted in the lingula and bilateral lower lobeswhich are likely infectious in etiology. No pleural effusions are present. The tracheobronchial tree is patent.    Abdomen/Pelvis:    The liver, spleen, pancreas, gallbladder and biliary tree are unremarkable.    There is no hydronephrosis. Stable 2.6 cm right renal lesion. The adrenal glands are unremarkable.    There is no bowel obstruction or ascites. Scattered colonic diverticula. The appendix is normal.    The bladder, prostate and seminal vesicles are unremarkable.    There is no abdominal or pelvic lymphadenopathy.    The aorta and IVC are unremarkable.    Degenerative changes of the spine.    IMPRESSION:    Multilobar pneumonia.    Mild cardiomegaly.    No small bowel obstruction or active bowel inflammation.    --- End of Report ---            BRYAN CHAVEZ MD; Attending Radiologist  This document has been electronically signed. Oct  7 2021  1:16AM    < end of copied text >        < from: CT Abdomen and Pelvis No Cont (10.07.21 @ 00:02) >    EXAM:  CT ABDOMEN AND PELVIS                          EXAM:  CT CHEST                            PROCEDURE DATE:  10/07/2021          INTERPRETATION:  CLINICAL INFORMATION: Shortness of breath. Abdominal pain. Fever.    TECHNIQUE: CT imaging of thechest, abdomen, and pelvis was obtained without IV contrast. MIP images were also obtained.    COMPARISON: 2021    FINDINGS:    Chest:    Multiple heterogeneous thyroid nodules noted bilaterally which can be better evaluated with ultrasound.    There is no supraclavicular, axillary, mediastinal or hilar lymphadenopathy.    The heart is mildly enlarged. There is no pericardial effusion.    Branching nodular opacities and patchy airspace opacities noted in the lingula and bilateral lower lobeswhich are likely infectious in etiology. No pleural effusions are present. The tracheobronchial tree is patent.    Abdomen/Pelvis:    The liver, spleen, pancreas, gallbladder and biliary tree are unremarkable.    There is no hydronephrosis. Stable 2.6 cm right renal lesion. The adrenal glands are unremarkable.    There is no bowel obstruction or ascites. Scattered colonic diverticula. The appendix is normal.    The bladder, prostate and seminal vesicles are unremarkable.    There is no abdominal or pelvic lymphadenopathy.    The aorta and IVC are unremarkable.    Degenerative changes of the spine.    IMPRESSION:    Multilobar pneumonia.    Mild cardiomegaly.    No small bowel obstruction or active bowel inflammation.    --- End of Report ---            BRYAN CHAVEZ MD; Attending Radiologist  This document has been electronically signed. Oct  7 2021  1:16AM    < end of copied text >

## 2021-10-07 NOTE — H&P ADULT - HISTORY OF PRESENT ILLNESS
75 yo M with PMHx of Type 2 DM (not on insulin), CAD s/p stents >4 years ago (not on plavix), Lupus, HTN, HLD, CKD stage 3, HepC, diverticulosis, history of blood clots in brain (s/p surgery 2013) from [facility] presenting with -- dyspnea    Last Echo 7/21/21  IMPRESSION:  Normal left ventricular systolic function, estimated LVEF of 60%.  Grossly normal RV size and systolic function.  Left atrial enlargement  Calcified trileaflet aortic valve with mild aortic stenosis, without AI.  Mild MR  Trace TR.  No significant pericardial effusion. 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  Grossly normal RV size and systolic function.  Left atrial enlargement  Calcified trileaflet aortic valve with mild aortic stenosis, without AI.  Mild MR  Trace TR.  No significant pericardial effusion. 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 Pt states he would intermittently feel chills and feverish, recorded no temps at group home. Endorses orthostasis. Denies recent sick contacts, recent travel hx. Last 3 days, Pt experienced SOB beyond his baseline (uses symbicort inhaler PRN). Denies SOB association with exertion. States he has been weaker, usually ambulates independently but now feels unable to hold his weight. Denies LOC, recent falls, vision loss, hemiparesis, convulsions, incontinence. Regarding palpitations, pt states for 3 days his heart would skip a beat, return to normal. Self limited episodes under a minute, no associated chest pain. Pt also describes urinary frequency beyond baseline, denies dysuria or incontinence.        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 Pt states he would intermittently feel chills and feverish, recorded no temps at group home since last couple of days, he states progressively worsening of dyspnea on exertion and rest, patient unable to walk short distances or climb stairs due to dyspnea, however denies orthopnea, cough, sputum production, night sweats. Patient mentioned some dizziness associated with lower extremities weakness, usually ambulates independently but now feels unable to hold his weight. Regarding palpitations, pt states for 3 days his heart would skip a beat, return to normal. Self limited episodes under a minute, no associated chest pain. Pt also describes urinary frequency beyond baseline, denies dysuria or incontinence, hematuria, constipation.     ED course:   VS: Afebrile, HR: 80 BP: 220/100->235/116-> 189/85, Spo2: 98 on NC RR: 20  Labs significant for low stable hemoglobin, BUN/creatinine: 36/2.30, Trop 0.149, Pro BMP 8061.   EKG on admission showed Afib @ 64 bpm, incompleta right BBB, LVH  CT head shows no  evidence of acute intracranial hemorrhage, midline shift or CT evidence of acute territorial infarct.  CT chest A/P demonstrated Multilobar pneumonia. Mild cardiomegaly. No mall bowel obstruction or active bowel inflammation.  Patient was given in the ED 10 mg IVP hydralazine 10 mg Labetalol IVP 1x, 2000 cc bolus NS 1x

## 2021-10-07 NOTE — PROGRESS NOTE ADULT - PROBLEM SELECTOR PLAN 5
Acute ARIELLA on CKD  - Baseline creatinine Cr 1.6 to 2.6  - On admission unknown 2.0-2.3   - Monitor BMP  - Avoid nephrotoxic medications  - Monitor renal indices  - Consider nephrology consult for worsening renal function Acute ARIELLA on CKD 3  - Baseline creatinine Cr 1.6 to 2.6  - On admission unknown 2.0-2.3   - Monitor BMP  - Avoid nephrotoxic medications  -HOLD home Lasix   - Consider nephrology consult for worsening renal function

## 2021-10-07 NOTE — H&P ADULT - PROBLEM SELECTOR PLAN 8
Abnormal CT finding   -CT chest on admission showed Multiple heterogeneous thyroid nodules noted bilaterally  - Will check TSH   - Recommended to follow up outpatient

## 2021-10-07 NOTE — H&P ADULT - PROBLEM SELECTOR PLAN 7
-Patient presenting with worsening dyspnea on rest and exertion   - On physical exam mild volume overload   - Last TTE on 07/21  Last TTE on 7/21/21 demonstrate normal left ventricular systolic function, estimated LVEF of 60%.LV diastolic dysfunction.  - On admission Pro BNP 8061 elevated compare to previous one on July 4140  - s/p IVF 2 lt bolus in the ED   - On Lasix 20 mg BID, will continue w/ renal function monitor   - Strict I&O's

## 2021-10-07 NOTE — H&P ADULT - PROBLEM SELECTOR PLAN 3
Afib   - Patient presents with new onset Afib, however pt's PCP prescribed digoxin, Eliquis by PCP on sept 14/21  - Admit to telemetry  - EKG on admission showed Afib @ 64 bpm, incompleta right BBB, LVH   - Last TTE on 7/21/21 demonstrate normal left ventricular systolic function, estimated LVEF of 60%.LV diastolic dysfunction.  - On , Eliquis labetalol, will continue ?? on  digoxin- follow level   - Will continue with Eliquis 5mg BID   - Monitor lytes and replete as needed  - Continuous cardiac monitoring  - Cardiology Dr Gamboa called by ER

## 2021-10-07 NOTE — DISCHARGE NOTE PROVIDER - NSDCFUADDINST_GEN_ALL_CORE_FT
Follow up with PMD in 1 week  Follow up with Renal/ Hematology & Psych MD in 2- weeks Follow up with PMD in 1 week  Follow up with Renal/ Hematology & Psych MD in 2- weeks  CBC & BMP in 1-2 week

## 2021-10-07 NOTE — H&P ADULT - PROBLEM SELECTOR PLAN 2
On admission /100  - Of note patient recently admitted with hypertensive urgency on 07/2021  - s/p 10mg IV hydralazine, 10mg IV labetalol in the ED   - On clonidine 0.2 TID, hydralazine 50mg TID, labetalol 100 mg BID, amlodipine 10 mg qd   - Will continue with home medications with hold parameters   - Continue to monitor BP

## 2021-10-07 NOTE — H&P ADULT - PROBLEM SELECTOR PLAN 12
Need for prophylaxis measures   DVT ppx: improve score 1. Continue home Eliquis 5mg BID   IMPROVE VTE Individual Risk Assessment  RISK                                                                Points  [  ] Previous VTE                                                  3  [  ] Thrombophilia                                               2  [  ] Lower limb paralysis                                      2        (unable to hold up >15 seconds)    [  ] Current Cancer                                              2         (within 6 months)  [  ] Immobilization > 24 hrs                                1  [  ] ICU/CCU stay > 24 hours                              1  [x  ] Age > 60                                                      1  IMPROVE VTE Score _______1__

## 2021-10-07 NOTE — DISCHARGE NOTE PROVIDER - NSDCCPCAREPLAN_GEN_ALL_CORE_FT
PRINCIPAL DISCHARGE DIAGNOSIS  Diagnosis: Acute CHF  Assessment and Plan of Treatment:       SECONDARY DISCHARGE DIAGNOSES  Diagnosis: Afib  Assessment and Plan of Treatment:     Diagnosis: Acute kidney injury superimposed on CKD  Assessment and Plan of Treatment:     Diagnosis: NSTEMI (non-ST elevation myocardial infarction)  Assessment and Plan of Treatment:     Diagnosis: Multifocal pneumonia  Assessment and Plan of Treatment:     Diagnosis: Hypertensive urgency  Assessment and Plan of Treatment:      PRINCIPAL DISCHARGE DIAGNOSIS  Diagnosis: Acute CHF  Assessment and Plan of Treatment: You were found to have acute congestive heart failure. You were given a medication called Lasix, which helps remove excess fluid from the body to help decrease symptoms of congestive heart failure.   Please follow up outpatient with a cardiologist and primary care physician and take your medications as advised.      SECONDARY DISCHARGE DIAGNOSES  Diagnosis: Afib  Assessment and Plan of Treatment: Atrial fibrillation is a condition where the different parts of the heart do not beat in time with each other. These changes can lead to serious issues, including clots that form in the heart and changes in the heart rate and rhythm where your heart might beat too fast. It is important that you take your prescribed medications as instructed to avoid clots forming and to make sure your heart does not beat too fast. It is also very important that you follow up with your outpatient cardiologist after discharge within a week to make sure your medications are working effectively.      Diagnosis: Acute kidney injury superimposed on CKD  Assessment and Plan of Treatment: Your kidneys act as your body's blood filter. Sometimes, in states of dehydration, some of the kidney cells do not receive appropriate blood flow - in these situations, the kidney will become injured and have a decrease in function. Your acute kidney injury (ARIELLA) was treated with appropriate rehydration, avoidance of medications that would injure the kidney, and with constant monitoring of blood tests that tell us about your kidneys. At discharge, your kidney injury has resolved. It is important that when you leave the hospital you inform your primary care doctor about the issue and follow up with the doctor within one week of discharge.      Diagnosis: Multifocal pneumonia  Assessment and Plan of Treatment: You were found to have pneumonia, or an infection of the lung. While here, you were treated with antibiotics and your blood oxygen levels were constantly monitored. On discharge, your pneumonia was found to be adequately treated. It is important that after discharge, you follow up with your primary care physician within a week to inform them about this hospitalization.      Diagnosis: Hypertensive urgency  Assessment and Plan of Treatment: You were admitted to the hospital because you were found to have hypertensive urgency. Hypertensive urgency is when your blood pressure is severly elevated (systolic greater than 180, or diastolic greater than 110). Your blood pressure stabilized during the course of your hospital admission.   Please follow up with your primary care physician and take medications as advised.     PRINCIPAL DISCHARGE DIAGNOSIS  Diagnosis: Acute CHF  Assessment and Plan of Treatment: You were found to have acute congestive heart failure. You were given a medication called Lasix, which helps remove excess fluid from the body to help decrease symptoms of congestive heart failure.   Please follow up outpatient with a cardiologist and primary care physician and take your medications as advised.  - START lasix 20mg orally daily  - STOP lasix 20 mg twice a day  - STOP Digoxin        SECONDARY DISCHARGE DIAGNOSES  Diagnosis: Afib  Assessment and Plan of Treatment: Atrial fibrillation is a condition where the different parts of the heart do not beat in time with each other. These changes can lead to serious issues, including clots that form in the heart and changes in the heart rate and rhythm where your heart might beat too fast. It is important that you take your prescribed medications as instructed to avoid clots forming and to make sure your heart does not beat too fast. It is also very important that you follow up with your outpatient cardiologist after discharge within a week to make sure your medications are working effectively.  - Continue eliquis 5mg twice daily      Diagnosis: Acute kidney injury superimposed on CKD  Assessment and Plan of Treatment: Your kidneys act as your body's blood filter. Sometimes, in states of dehydration, some of the kidney cells do not receive appropriate blood flow - in these situations, the kidney will become injured and have a decrease in function. Your acute kidney injury (ARIELLA) was treated with appropriate rehydration, avoidance of medications that would injure the kidney, and with constant monitoring of blood tests that tell us about your kidneys. At discharge, your kidney injury has resolved. It is important that when you leave the hospital you inform your primary care doctor about the issue and follow up with the doctor within one week of discharge.      Diagnosis: Multifocal pneumonia  Assessment and Plan of Treatment: You were found to have pneumonia, or an infection of the lung. While here, you were treated with antibiotics and your blood oxygen levels were constantly monitored. On discharge, your pneumonia was found to be adequately treated. It is important that after discharge, you follow up with your primary care physician within a week to inform them about this hospitalization.      Diagnosis: Hypertensive urgency  Assessment and Plan of Treatment: You were admitted to the hospital because you were found to have hypertensive urgency. Hypertensive urgency is when your blood pressure is severly elevated (systolic greater than 180, or diastolic greater than 110). Your blood pressure stabilized during the course of your hospital admission.   Please follow up with your primary care physician and take medications as advised.  -Continue clonidine 0.1mg three times a day, a prescription was sent to your pharmacy  -Continue IMDUR 30mg orally daily  -START nifedipine 30mg daily, a prescription was sent to your pharmacy  - START cardura 8mg daily at bedtime, a prescription was sent to your pharmacy  - STOP cardura 4mg twice daily  - START hydralazine 100mg every 8 hours, a prescription was sent to your pharmacy  - STOP hydralazine 50mg every 8hours  - Continue amlodipine 10mg daily  - STOP labetalol 100mg twice a day     PRINCIPAL DISCHARGE DIAGNOSIS  Diagnosis: Acute CHF  Assessment and Plan of Treatment: You were found to have acute congestive heart failure. You were given a medication called Lasix, which helps remove excess fluid from the body to help decrease symptoms of congestive heart failure.   Please follow up outpatient with a cardiologist and primary care physician and take your medications as advised.  - START lasix 20mg orally daily  - STOP lasix 20 mg twice a day  - STOP Digoxin  - CONTINUE abilify 30mg daily  - CONTINUE pulmicort 2 puffs two times a day  - CONTINUE oxybutynin 5mg four times a day        SECONDARY DISCHARGE DIAGNOSES  Diagnosis: Afib  Assessment and Plan of Treatment: Atrial fibrillation is a condition where the different parts of the heart do not beat in time with each other. These changes can lead to serious issues, including clots that form in the heart and changes in the heart rate and rhythm where your heart might beat too fast. It is important that you take your prescribed medications as instructed to avoid clots forming and to make sure your heart does not beat too fast. It is also very important that you follow up with your outpatient cardiologist after discharge within a week to make sure your medications are working effectively.  - Continue eliquis 5mg twice daily      Diagnosis: Acute kidney injury superimposed on CKD  Assessment and Plan of Treatment: Your kidneys act as your body's blood filter. Sometimes, in states of dehydration, some of the kidney cells do not receive appropriate blood flow - in these situations, the kidney will become injured and have a decrease in function. Your acute kidney injury (ARIELLA) was treated with appropriate rehydration, avoidance of medications that would injure the kidney, and with constant monitoring of blood tests that tell us about your kidneys. At discharge, your kidney injury has resolved. It is important that when you leave the hospital you inform your primary care doctor about the issue and follow up with the doctor within one week of discharge.      Diagnosis: Multifocal pneumonia  Assessment and Plan of Treatment: You were found to have pneumonia, or an infection of the lung. While here, you were treated with antibiotics and your blood oxygen levels were constantly monitored. On discharge, your pneumonia was found to be adequately treated. It is important that after discharge, you follow up with your primary care physician within a week to inform them about this hospitalization.      Diagnosis: Hypertensive urgency  Assessment and Plan of Treatment: You were admitted to the hospital because you were found to have hypertensive urgency. Hypertensive urgency is when your blood pressure is severly elevated (systolic greater than 180, or diastolic greater than 110). Your blood pressure stabilized during the course of your hospital admission.   Please follow up with your primary care physician and take medications as advised.  -Continue clonidine 0.1mg three times a day, a prescription was sent to your pharmacy  -Continue IMDUR 30mg orally daily  -START nifedipine 30mg daily, a prescription was sent to your pharmacy  - START cardura 8mg daily at bedtime, a prescription was sent to your pharmacy  - STOP cardura 4mg twice daily  - START hydralazine 100mg every 8 hours, a prescription was sent to your pharmacy  - STOP hydralazine 50mg every 8hours  - Continue amlodipine 10mg daily  - STOP labetalol 100mg twice a day    Diagnosis: GERD (gastroesophageal reflux disease)  Assessment and Plan of Treatment: You were previously diagnosed with GERD. This means you have an abnormally high production of acid in your stomach. Please continue to take your medications as prescribed and follow up with your PCP for further management.  - CONTINUE famotidine 20mg every 48 hours       Diagnosis: Depression, major  Assessment and Plan of Treatment: Please follow up with your primary care physician and take medications as advised.   - CONTINUE lamotrigine 100mg oral daily   - CONTINUE mirtazapine 30mg at bedtime   - CONTINUE venlafaxine 150mg daily     PRINCIPAL DISCHARGE DIAGNOSIS  Diagnosis: Multifocal pneumonia  Assessment and Plan of Treatment: You were found to have pneumonia on CT Chest ,   PNA is  infection of the lung.   -, you were treated with  IV & Oral antibiotics and your blood oxygen levels were constantly monitored.  -  your pneumonia was found to be adequately treated. It is important that after discharge, you follow up with your primary care physician within a week to inform them about this hospitalization.  -Repeat CXR in 8-12 hrs         SECONDARY DISCHARGE DIAGNOSES  Diagnosis: Hypertensive urgency  Assessment and Plan of Treatment: You were admitted to the hospital because you were found to have very High Blood Pressure --hypertensive urgency.  - Hypertensive urgency is when your blood pressure is severly elevated (systolic greater than 180, or diastolic greater than 110). Your blood pressure stabilized during the course of your hospital admission.   Please follow up with your primary care physician and take medications as advised.  -YOUR MEDICATIONS DOSES has been changed.  -Continue Clonidine 0.1mg three times a day, a prescription was sent to your pharmacy- (We have Decreased dose)  -Continue IMDUR 30mg 1 tab  orally daily  -START Nifedipine 30mg 1 tab  daily, a prescription was sent to your pharmacy  -- Continue amlodipine 10mg daily  - START Cardura 8mg daily at bedtime, a prescription was sent to your pharmacy- Dose increased.  - STOP cardura 4mg twice daily- ( we have Increased Dose)  - START hydralazine 100mg every 8 hours, a prescription was sent to your pharmacy(We have increased Dose)  - STOP labetalol 100mg twice a day    Diagnosis: Acute kidney injury superimposed on CKD  Assessment and Plan of Treatment: ARIELLA/CKD 4 . Your acute kidney injury (ARIELLA) was treated with appropriate rehydration, avoidance of medications that would injure the kidney, and with constant monitoring of blood tests that tell us about your kidneys.   - Lasix 20 mg 1 tab daily -WE have decreased dose   -primary care doctor about the issue and follow up with the doctor within one week of discharge.  -Follow up with Nephrologist Dr Rodarte-for BMP in 1 week      Diagnosis: Afib  Assessment and Plan of Treatment: Atrial fibrillation -Chronic   HR stable   - Continue eliquis 5mg twice daily      Diagnosis: Chronic diastolic congestive heart failure  Assessment and Plan of Treatment: You have chronic congestive heart failure.  Please follow up outpatient with a cardiologist and primary care physician and take your medications as advised.  - START lasix 20mg orally daily   - DO NOT take  lasix 20 mg twice a day  - STOP Digoxin  - CONTINUE pulmicort 2 puffs two times a day  - CONTINUE oxybutynin 5mg four times a day    Diagnosis: Anemia of chronic disease  Assessment and Plan of Treatment: you have chronic Anemia 2/2 CKD 4, ACD   -You saw hematologist in hospital anemia work up done  -- serum immunofixation with weak IgG-kappa, normal IgG/A/M, both kappa and lambda elevated and k/l ratio sl elevated. Findings more c/w MGUS or reactive, can be monitored serially as outpatient  -S/P IV IRON given in house ,   -ON B 12 daily    Diagnosis: GERD (gastroesophageal reflux disease)  Assessment and Plan of Treatment: You were previously diagnosed with GERD.   - CONTINUE famotidine 20mg every 48 hours       Diagnosis: Multiple thyroid nodules  Assessment and Plan of Treatment: you have Thyroid Nodules - TSH- Nl  seen by DR C Perlman -Endo Had Thyroid ultrasound-nodules, largest 1.0cm;  Follow up out pt with Endocrinologist, out pt follow up    Diagnosis: CAD (coronary artery disease)  Assessment and Plan of Treatment: On ASA daily,    Diagnosis: Depression, major  Assessment and Plan of Treatment: Please follow up with your primary care physician and take medications as advised.   - CONTINUE lamotrigine 100mg oral daily   - CONTINUE mirtazapine 30mg at bedtime   - CONTINUE venlafaxine 150mg daily  -- CONTINUE abilify 30mg daily  -Follow up with ACT team in community in 1week    Diagnosis: DM (diabetes mellitus), type 2  Assessment and Plan of Treatment: stable, not on any  home meds- Diabetic meds     PRINCIPAL DISCHARGE DIAGNOSIS  Diagnosis: Multifocal pneumonia  Assessment and Plan of Treatment: You were found to have pneumonia on CT Chest ,   PNA is  infection of the lung.   -, you were treated with  IV & Oral antibiotics and your blood oxygen levels were constantly monitored.  -  your pneumonia was found to be adequately treated. It is important that after discharge, you follow up with your primary care physician within a week to inform them about this hospitalization.  -Repeat CXR in 8-12 hrs   -Continue Inh 2x day         SECONDARY DISCHARGE DIAGNOSES  Diagnosis: Afib  Assessment and Plan of Treatment: Atrial fibrillation -Chronic   HR stable   - Continue eliquis 5mg twice daily  -STOP Labetalol 2/2 Low Heart Rate      Diagnosis: Acute kidney injury superimposed on CKD  Assessment and Plan of Treatment: ARIELLA/CKD 4 . Your acute kidney injury (ARIELLA) was treated with appropriate rehydration, avoidance of medications that would injure the kidney, and with constant monitoring of blood tests that tell us about your kidneys.   - Lasix 40 mg 1 tab daily -  -primary care doctor about the issue and follow up with the doctor within one week of discharge.  -Follow up with Nephrologist Dr Rodarte-for BMP in 1 week      Diagnosis: Hypertensive urgency  Assessment and Plan of Treatment: You were admitted to the hospital because you were found to have very High Blood Pressure --hypertensive urgency.  - Hypertensive urgency is when your blood pressure is severly elevated (systolic greater than 180, or diastolic greater than 110). Your blood pressure stabilized during the course of your hospital admission.   Please follow up with your primary care physician and take medications as advised.  -YOUR MEDICATIONS DOSES has been changed.  -Continue Clonidine 0.1mg three times a day, a prescription was sent to your pharmacy- (We have Decreased dose)  -Continue IMDUR 30mg 1 tab  orally daily  -START Nifedipine  XL 60mg 1 tab  daily, a prescription was sent to your pharmacy   -At home you have Nifidipine XL 30 mg ,PLEASE Take 2 pills daily TOTAL Dose 60 mg daily  until you get new priscription with Nifidipine XL 60 mg daily.  -- Continue amlodipine 10 mg daily  - START Cardura 8mg daily at bedtime, a prescription was sent to your pharmacy- Dose has  increased.  - STOP cardura 4mg  daily- ( we have Increased Dose)  - START hydralazine 100 mg every 8 hours, a prescription was sent to your pharmacy(We have increased Dose)  - STOP labetalol 100mg twice a dayas Low HR    Diagnosis: Bradycardia  Assessment and Plan of Treatment: Pt was Found to have significant Bradycardia & Pauses  on Tele   STOP Labatolol   -STOP Digoxin   -Dose of Clonidine has been decreased 2/2 Bradycardia as above as per Cardiology  -Follow up with DR Gamboa cardiology in 1 -2 week.      Diagnosis: GERD (gastroesophageal reflux disease)  Assessment and Plan of Treatment: You were previously diagnosed with GERD.   - CONTINUE famotidine 20mg every 48 hours       Diagnosis: Depression, major  Assessment and Plan of Treatment: Please follow up with your primary care physician and take medications as advised.   - CONTINUE lamotrigine 100mg oral daily   - CONTINUE mirtazapine 30mg at bedtime   - CONTINUE venlafaxine XR  150mg daily  -- CONTINUE abilify 30mg daily  -Follow up with ACT team in community in 1week  -Follow up with Psych MD in 1 week    Diagnosis: Chronic diastolic congestive heart failure  Assessment and Plan of Treatment: You have chronic congestive heart failure.  Please follow up outpatient with a cardiologist and primary care physician and take your medications as advised.  - START lasix 40mg orally daily   - DO NOT take  lasix 20 mg twice a day  - STOP Digoxin as Low HR   - CONTINUE pulmicort 2 puffs two times a day  - CONTINUE oxybutynin 5mg four times a day    Diagnosis: Anemia of chronic disease  Assessment and Plan of Treatment: you have chronic Anemia 2/2 CKD 4, ACD   -You got 1 u Blood Transfusion in hospitla for Anemia   -You saw hematologist in hospital anemia work up done  -- serum immunofixation with weak IgG-kappa, normal IgG/A/M, both kappa and lambda elevated and k/l ratio sl elevated. Findings more c/w MGUS or reactive, can be monitored serially as outpatient  -S/P IV IRON given in house ,   -ON B 12 1 tab daily daily. Folic Acid 1 mg daily   -Check CBC in 1- 2  week  -Follow up with Hematology DR Rodriguez in 2 weeks for Anemia    Diagnosis: DM (diabetes mellitus), type 2  Assessment and Plan of Treatment: stable, not on any  home meds- Diabetic meds-Hb A1c is 6.3   NO Meds as CKD & Age   Diet control    Diagnosis: CAD (coronary artery disease)  Assessment and Plan of Treatment: On ASA daily,  Start Atorvastatin 10 mg 1 tab daily at night   -STOP Finofibrate 2/2 CKD 4    Diagnosis: Multiple thyroid nodules  Assessment and Plan of Treatment: you have Thyroid Nodules - TSH- Nl  seen by DR C Perlman -Endo Had Thyroid ultrasound-nodules, largest 1.0cm;  Follow up out pt with Endocrinologist, out pt follow up     PRINCIPAL DISCHARGE DIAGNOSIS  Diagnosis: Multifocal pneumonia  Assessment and Plan of Treatment: You were found to have pneumonia on CT Chest ,   PNA is  infection of the lung.   -, you were treated with  IV & Oral antibiotics and your blood oxygen levels were constantly monitored.  -  your pneumonia was found to be adequately treated. It is important that after discharge, you follow up with your primary care physician within a week to inform them about this hospitalization.  -Repeat CXR in 8-12 hrs   -Continue Inh 2x day         SECONDARY DISCHARGE DIAGNOSES  Diagnosis: Afib  Assessment and Plan of Treatment: Atrial fibrillation -Chronic   HR stable   - Continue eliquis 5mg twice daily  -STOP Labetalol 2/2 Low Heart Rate      Diagnosis: Acute kidney injury superimposed on CKD  Assessment and Plan of Treatment: ARIELLA/CKD 4 . Your acute kidney injury (ARIELLA) was treated with appropriate rehydration, avoidance of medications that would injure the kidney, and with constant monitoring of blood tests that tell us about your kidneys.   - Lasix 40 mg 1 tab daily -  -primary care doctor about the issue and follow up with the doctor within one week of discharge.  -Follow up with Nephrologist Dr Rodarte-for BMP in 1 week      Diagnosis: Hypertensive urgency  Assessment and Plan of Treatment: You were admitted to the hospital because you were found to have very High Blood Pressure --hypertensive urgency.  - Hypertensive urgency is when your blood pressure is severly elevated (systolic greater than 180, or diastolic greater than 110). Your blood pressure stabilized during the course of your hospital admission.   Please follow up with your primary care physician and take medications as advised.  -YOUR MEDICATIONS DOSES has been changed.  -Continue Clonidine 0.1mg three times a day, a prescription was sent to your pharmacy- (We have Decreased dose)  -Continue IMDUR 30mg 1 tab  orally daily  -START Nifedipine  XL 60mg 1 tab  daily, a prescription was sent to your pharmacy.  -- Continue amlodipine 10 mg daily  - START Cardura 8mg daily at bedtime, a prescription was sent to your pharmacy- Dose has  increased.  - STOP cardura 4mg  daily- ( we have Increased Dose)  - START hydralazine 100 mg every 8 hours, a prescription was sent to your pharmacy(We have increased Dose)  - STOP labetalol 100mg twice a dayas Low HR    Diagnosis: Bradycardia  Assessment and Plan of Treatment: Pt was Found to have significant Bradycardia & Pauses  on Tele   STOP Labatolol   -STOP Digoxin   -Dose of Clonidine has been decreased 2/2 Bradycardia as above as per Cardiology  -Follow up with DR Gamboa cardiology in 1 -2 week.      Diagnosis: GERD (gastroesophageal reflux disease)  Assessment and Plan of Treatment: You were previously diagnosed with GERD.   - CONTINUE famotidine 20mg every 48 hours       Diagnosis: Depression, major  Assessment and Plan of Treatment: Please follow up with your primary care physician and take medications as advised.   - CONTINUE lamotrigine 100mg oral daily   - CONTINUE mirtazapine 30mg at bedtime   - CONTINUE venlafaxine XR  150mg daily  -- CONTINUE abilify 30mg daily  -Follow up with ACT team in community in 1week  -Follow up with Psych MD in 1 week    Diagnosis: Chronic diastolic congestive heart failure  Assessment and Plan of Treatment: You have chronic congestive heart failure.  Please follow up outpatient with a cardiologist and primary care physician and take your medications as advised.  - START lasix 40mg orally daily   - DO NOT take  lasix 20 mg twice a day  - STOP Digoxin as Low HR   - CONTINUE pulmicort 2 puffs two times a day  - CONTINUE oxybutynin 5mg four times a day    Diagnosis: Anemia of chronic disease  Assessment and Plan of Treatment: you have chronic Anemia 2/2 CKD 4, ACD   -[facility]   -You saw hematologist in hospital anemia work up done  -- serum immunofixation with weak IgG-kappa, normal IgG/A/M, both kappa and lambda elevated and k/l ratio sl elevated. Findings more c/w MGUS or reactive, can be monitored serially as outpatient  -S/P IV IRON given in house ,   -ON B 12 1 tab daily daily. Folic Acid 1 mg daily   -Check CBC in 1- 2  week  -Follow up with Hematology DR Rodriguez in 2 weeks for Anemia    Diagnosis: DM (diabetes mellitus), type 2  Assessment and Plan of Treatment: stable, not on any  home meds- Diabetic meds-Hb A1c is 6.3   NO Meds as CKD & Age   Diet control    Diagnosis: CAD (coronary artery disease)  Assessment and Plan of Treatment: On ASA daily,  Start Atorvastatin 10 mg 1 tab daily at night   -STOP Finofibrate 2/2 CKD 4    Diagnosis: Multiple thyroid nodules  Assessment and Plan of Treatment: you have Thyroid Nodules - TSH- Nl  seen by DR C Perlman -Endo Had Thyroid ultrasound-nodules, largest 1.0cm;  Follow up out pt with Endocrinologist, out pt follow up

## 2021-10-07 NOTE — PHYSICAL THERAPY INITIAL EVALUATION ADULT - PERTINENT HX OF CURRENT PROBLEM, REHAB EVAL
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 Patient admitted for hypertensive urgency, Afib and PNA.

## 2021-10-07 NOTE — H&P ADULT - PROBLEM SELECTOR PLAN 11
History of T2DM   - last A1C on 07/21 6.1  - Off of meds   - Insulin sliding scale  - Accu checks w/ hypoglycemic protocol

## 2021-10-08 LAB
A1C WITH ESTIMATED AVERAGE GLUCOSE RESULT: 6.3 % — HIGH (ref 4–5.6)
ALBUMIN SERPL ELPH-MCNC: 2.7 G/DL — LOW (ref 3.3–5)
ALP SERPL-CCNC: 33 U/L — LOW (ref 40–120)
ALT FLD-CCNC: 21 U/L — SIGNIFICANT CHANGE UP (ref 12–78)
ANION GAP SERPL CALC-SCNC: 6 MMOL/L — SIGNIFICANT CHANGE UP (ref 5–17)
AST SERPL-CCNC: 13 U/L — LOW (ref 15–37)
BASOPHILS # BLD AUTO: 0.02 K/UL — SIGNIFICANT CHANGE UP (ref 0–0.2)
BASOPHILS NFR BLD AUTO: 0.3 % — SIGNIFICANT CHANGE UP (ref 0–2)
BILIRUB SERPL-MCNC: 0.3 MG/DL — SIGNIFICANT CHANGE UP (ref 0.2–1.2)
BUN SERPL-MCNC: 41 MG/DL — HIGH (ref 7–23)
CALCIUM SERPL-MCNC: 8.4 MG/DL — LOW (ref 8.5–10.1)
CHLORIDE SERPL-SCNC: 108 MMOL/L — SIGNIFICANT CHANGE UP (ref 96–108)
CO2 SERPL-SCNC: 27 MMOL/L — SIGNIFICANT CHANGE UP (ref 22–31)
COVID-19 SPIKE DOMAIN AB INTERP: POSITIVE
COVID-19 SPIKE DOMAIN ANTIBODY RESULT: >250 U/ML — HIGH
CREAT SERPL-MCNC: 3.1 MG/DL — HIGH (ref 0.5–1.3)
CULTURE RESULTS: SIGNIFICANT CHANGE UP
EOSINOPHIL # BLD AUTO: 0.25 K/UL — SIGNIFICANT CHANGE UP (ref 0–0.5)
EOSINOPHIL NFR BLD AUTO: 3.4 % — SIGNIFICANT CHANGE UP (ref 0–6)
ERYTHROCYTE [SEDIMENTATION RATE] IN BLOOD: 28 MM/HR — HIGH (ref 0–20)
ESTIMATED AVERAGE GLUCOSE: 134 MG/DL — HIGH (ref 68–114)
GLUCOSE SERPL-MCNC: 127 MG/DL — HIGH (ref 70–99)
HCT VFR BLD CALC: 27.3 % — LOW (ref 39–50)
HGB BLD-MCNC: 8.7 G/DL — LOW (ref 13–17)
IMM GRANULOCYTES NFR BLD AUTO: 0.4 % — SIGNIFICANT CHANGE UP (ref 0–1.5)
LYMPHOCYTES # BLD AUTO: 1.59 K/UL — SIGNIFICANT CHANGE UP (ref 1–3.3)
LYMPHOCYTES # BLD AUTO: 21.6 % — SIGNIFICANT CHANGE UP (ref 13–44)
MAGNESIUM SERPL-MCNC: 2.2 MG/DL — SIGNIFICANT CHANGE UP (ref 1.6–2.6)
MCHC RBC-ENTMCNC: 28.1 PG — SIGNIFICANT CHANGE UP (ref 27–34)
MCHC RBC-ENTMCNC: 31.9 GM/DL — LOW (ref 32–36)
MCV RBC AUTO: 88.1 FL — SIGNIFICANT CHANGE UP (ref 80–100)
MONOCYTES # BLD AUTO: 0.47 K/UL — SIGNIFICANT CHANGE UP (ref 0–0.9)
MONOCYTES NFR BLD AUTO: 6.4 % — SIGNIFICANT CHANGE UP (ref 2–14)
NEUTROPHILS # BLD AUTO: 4.99 K/UL — SIGNIFICANT CHANGE UP (ref 1.8–7.4)
NEUTROPHILS NFR BLD AUTO: 67.9 % — SIGNIFICANT CHANGE UP (ref 43–77)
NRBC # BLD: 0 /100 WBCS — SIGNIFICANT CHANGE UP (ref 0–0)
PHOSPHATE SERPL-MCNC: 6.4 MG/DL — HIGH (ref 2.5–4.5)
PLATELET # BLD AUTO: 249 K/UL — SIGNIFICANT CHANGE UP (ref 150–400)
POTASSIUM SERPL-MCNC: 3.7 MMOL/L — SIGNIFICANT CHANGE UP (ref 3.5–5.3)
POTASSIUM SERPL-SCNC: 3.7 MMOL/L — SIGNIFICANT CHANGE UP (ref 3.5–5.3)
PROT SERPL-MCNC: 6.3 G/DL — SIGNIFICANT CHANGE UP (ref 6–8.3)
RBC # BLD: 3.07 M/UL — LOW (ref 4.2–5.8)
RBC # BLD: 3.1 M/UL — LOW (ref 4.2–5.8)
RBC # FLD: 17.2 % — HIGH (ref 10.3–14.5)
RETICS #: 49.4 K/UL — SIGNIFICANT CHANGE UP (ref 25–125)
RETICS/RBC NFR: 1.6 % — SIGNIFICANT CHANGE UP (ref 0.5–2.5)
SARS-COV-2 IGG+IGM SERPL QL IA: >250 U/ML — HIGH
SARS-COV-2 IGG+IGM SERPL QL IA: POSITIVE
SODIUM SERPL-SCNC: 141 MMOL/L — SIGNIFICANT CHANGE UP (ref 135–145)
SPECIMEN SOURCE: SIGNIFICANT CHANGE UP
WBC # BLD: 7.35 K/UL — SIGNIFICANT CHANGE UP (ref 3.8–10.5)
WBC # FLD AUTO: 7.35 K/UL — SIGNIFICANT CHANGE UP (ref 3.8–10.5)

## 2021-10-08 PROCEDURE — 93010 ELECTROCARDIOGRAM REPORT: CPT

## 2021-10-08 PROCEDURE — 99232 SBSQ HOSP IP/OBS MODERATE 35: CPT

## 2021-10-08 RX ORDER — LABETALOL HCL 100 MG
200 TABLET ORAL
Refills: 0 | Status: DISCONTINUED | OUTPATIENT
Start: 2021-10-08 | End: 2021-10-08

## 2021-10-08 RX ORDER — PREGABALIN 225 MG/1
1000 CAPSULE ORAL DAILY
Refills: 0 | Status: DISCONTINUED | OUTPATIENT
Start: 2021-10-08 | End: 2021-10-16

## 2021-10-08 RX ORDER — SODIUM CHLORIDE 0.65 %
1 AEROSOL, SPRAY (ML) NASAL
Refills: 0 | Status: DISCONTINUED | OUTPATIENT
Start: 2021-10-08 | End: 2021-10-16

## 2021-10-08 RX ORDER — LABETALOL HCL 100 MG
100 TABLET ORAL
Refills: 0 | Status: DISCONTINUED | OUTPATIENT
Start: 2021-10-08 | End: 2021-10-08

## 2021-10-08 RX ORDER — FOLIC ACID 0.8 MG
1 TABLET ORAL DAILY
Refills: 0 | Status: DISCONTINUED | OUTPATIENT
Start: 2021-10-08 | End: 2021-10-16

## 2021-10-08 RX ORDER — HYDRALAZINE HCL 50 MG
100 TABLET ORAL EVERY 8 HOURS
Refills: 0 | Status: DISCONTINUED | OUTPATIENT
Start: 2021-10-08 | End: 2021-10-16

## 2021-10-08 RX ADMIN — ARIPIPRAZOLE 30 MILLIGRAM(S): 15 TABLET ORAL at 14:04

## 2021-10-08 RX ADMIN — Medication 100 MILLIGRAM(S): at 21:12

## 2021-10-08 RX ADMIN — Medication 0.2 MILLIGRAM(S): at 05:35

## 2021-10-08 RX ADMIN — Medication 150 MILLIGRAM(S): at 14:04

## 2021-10-08 RX ADMIN — NYSTATIN CREAM 1 APPLICATION(S): 100000 CREAM TOPICAL at 05:38

## 2021-10-08 RX ADMIN — Medication 20 MILLIGRAM(S): at 05:35

## 2021-10-08 RX ADMIN — Medication 1: at 08:51

## 2021-10-08 RX ADMIN — APIXABAN 5 MILLIGRAM(S): 2.5 TABLET, FILM COATED ORAL at 05:35

## 2021-10-08 RX ADMIN — PIPERACILLIN AND TAZOBACTAM 25 GRAM(S): 4; .5 INJECTION, POWDER, LYOPHILIZED, FOR SOLUTION INTRAVENOUS at 21:12

## 2021-10-08 RX ADMIN — Medication 0.2 MILLIGRAM(S): at 21:12

## 2021-10-08 RX ADMIN — PIPERACILLIN AND TAZOBACTAM 25 GRAM(S): 4; .5 INJECTION, POWDER, LYOPHILIZED, FOR SOLUTION INTRAVENOUS at 14:07

## 2021-10-08 RX ADMIN — Medication 4 MILLIGRAM(S): at 08:54

## 2021-10-08 RX ADMIN — Medication 0.2 MILLIGRAM(S): at 14:04

## 2021-10-08 RX ADMIN — Medication 100 MILLIGRAM(S): at 14:05

## 2021-10-08 RX ADMIN — Medication 100 MILLIGRAM(S): at 18:24

## 2021-10-08 RX ADMIN — Medication 5 MILLIGRAM(S): at 02:24

## 2021-10-08 RX ADMIN — BUDESONIDE AND FORMOTEROL FUMARATE DIHYDRATE 2 PUFF(S): 160; 4.5 AEROSOL RESPIRATORY (INHALATION) at 18:26

## 2021-10-08 RX ADMIN — PIPERACILLIN AND TAZOBACTAM 25 GRAM(S): 4; .5 INJECTION, POWDER, LYOPHILIZED, FOR SOLUTION INTRAVENOUS at 05:33

## 2021-10-08 RX ADMIN — AZITHROMYCIN 500 MILLIGRAM(S): 500 TABLET, FILM COATED ORAL at 14:06

## 2021-10-08 RX ADMIN — APIXABAN 5 MILLIGRAM(S): 2.5 TABLET, FILM COATED ORAL at 18:22

## 2021-10-08 RX ADMIN — Medication 50 MILLIGRAM(S): at 05:38

## 2021-10-08 RX ADMIN — NYSTATIN CREAM 1 APPLICATION(S): 100000 CREAM TOPICAL at 18:22

## 2021-10-08 RX ADMIN — AMLODIPINE BESYLATE 10 MILLIGRAM(S): 2.5 TABLET ORAL at 05:35

## 2021-10-08 RX ADMIN — BUDESONIDE AND FORMOTEROL FUMARATE DIHYDRATE 2 PUFF(S): 160; 4.5 AEROSOL RESPIRATORY (INHALATION) at 05:37

## 2021-10-08 RX ADMIN — Medication 81 MILLIGRAM(S): at 14:05

## 2021-10-08 RX ADMIN — Medication 5 MILLIGRAM(S): at 14:06

## 2021-10-08 RX ADMIN — Medication 5 MILLIGRAM(S): at 05:35

## 2021-10-08 RX ADMIN — LAMOTRIGINE 100 MILLIGRAM(S): 25 TABLET, ORALLY DISINTEGRATING ORAL at 14:08

## 2021-10-08 RX ADMIN — Medication 5 MILLIGRAM(S): at 23:48

## 2021-10-08 RX ADMIN — Medication 200 MILLIGRAM(S): at 05:34

## 2021-10-08 RX ADMIN — PANTOPRAZOLE SODIUM 40 MILLIGRAM(S): 20 TABLET, DELAYED RELEASE ORAL at 05:35

## 2021-10-08 RX ADMIN — Medication 5 MILLIGRAM(S): at 21:12

## 2021-10-08 RX ADMIN — Medication 145 MILLIGRAM(S): at 14:05

## 2021-10-08 RX ADMIN — Medication 4 MILLIGRAM(S): at 21:12

## 2021-10-08 RX ADMIN — MIRTAZAPINE 30 MILLIGRAM(S): 45 TABLET, ORALLY DISINTEGRATING ORAL at 21:14

## 2021-10-08 NOTE — PROGRESS NOTE ADULT - PROBLEM SELECTOR PLAN 2
On admission /100 --> BP elevated but is improving   - Of note patient recently admitted with hypertensive urgency on 07/2021  - s/p 10mg IV hydralazine, 10mg IV labetalol in the ED   - On clonidine 0.2 TID, hydralazine 50mg TID, labetalol 100 mg BID, amlodipine 10 mg qd   - Will continue with home medications with hold parameters   - Continue to monitor BP On admission /100 --> BP elevated but is improving   - Of note patient recently admitted with hypertensive urgency on 07/2021  - s/p 10mg IV hydralazine, 10mg IV labetalol in the ED   - On clonidine 0.2 TID, hydralazine 50mg TID,--> increase 100 mg q 8 hrs, labetalol 100 mg BID, amlodipine 10 mg qd   - Will continue with home medications with hold parameters   - Continue to monitor BP On admission /100 --> BP elevated but is improving   - Of note patient recently admitted with hypertensive urgency on 07/2021  - s/p 10mg IV hydralazine, 10mg IV labetalol in the ED   - On clonidine 0.2 TID, hydralazine 50mg TID,--> increase 100 mg q 8 hrs, labetalol 100 mg BID, amlodipine 10 mg qd , On lasix 20 mg 2x day  - Will continue with home medications with hold parameters   - Continue to monitor BP

## 2021-10-08 NOTE — PROGRESS NOTE ADULT - SUBJECTIVE AND OBJECTIVE BOX
Subjective: no dizziness. Denies difficulty voiding.   --> 116 over 24h.       MEDICATIONS  (STANDING):  amLODIPine   Tablet 10 milliGRAM(s) Oral daily  apixaban 5 milliGRAM(s) Oral every 12 hours  ARIPiprazole 30 milliGRAM(s) Oral daily  aspirin enteric coated 81 milliGRAM(s) Oral daily  azithromycin   Tablet 500 milliGRAM(s) Oral daily  budesonide 160 MICROgram(s)/formoterol 4.5 MICROgram(s) Inhaler 2 Puff(s) Inhalation two times a day  cloNIDine 0.2 milliGRAM(s) Oral three times a day  dextrose 40% Gel 15 Gram(s) Oral once  dextrose 5%. 1000 milliLiter(s) (50 mL/Hr) IV Continuous <Continuous>  dextrose 50% Injectable 25 Gram(s) IV Push once  doxazosin 4 milliGRAM(s) Oral <User Schedule>  famotidine    Tablet 20 milliGRAM(s) Oral every 48 hours  fenofibrate Tablet 145 milliGRAM(s) Oral daily  furosemide    Tablet 20 milliGRAM(s) Oral two times a day  glucagon  Injectable 1 milliGRAM(s) IntraMuscular once  hydrALAZINE 100 milliGRAM(s) Oral every 8 hours  insulin lispro (ADMELOG) corrective regimen sliding scale   SubCutaneous three times a day before meals  labetalol 100 milliGRAM(s) Oral two times a day  lamoTRIgine 100 milliGRAM(s) Oral daily  mirtazapine 30 milliGRAM(s) Oral at bedtime  nystatin Powder 1 Application(s) Topical two times a day  oxybutynin 5 milliGRAM(s) Oral four times a day  pantoprazole    Tablet 40 milliGRAM(s) Oral before breakfast  piperacillin/tazobactam IVPB.. 3.375 Gram(s) IV Intermittent every 8 hours  venlafaxine XR. 150 milliGRAM(s) Oral daily    MEDICATIONS  (PRN):  acetaminophen   Tablet .. 650 milliGRAM(s) Oral every 6 hours PRN Temp greater or equal to 38C (100.4F), Mild Pain (1 - 3)  melatonin 3 milliGRAM(s) Oral at bedtime PRN Insomnia  ondansetron Injectable 4 milliGRAM(s) IV Push every 8 hours PRN Nausea and/or Vomiting  sodium chloride 0.65% Nasal 1 Spray(s) Both Nostrils two times a day PRN Nasal Congestion          T(C): 36.3 (10-08-21 @ 11:36), Max: 36.4 (10-08-21 @ 04:24)  HR: 56 (10-08-21 @ 11:36) (54 - 66)  BP: 116/70 (10-08-21 @ 11:36) (116/70 - 170/79)  RR: 19 (10-08-21 @ 11:36) (18 - 20)  SpO2: 93% (10-08-21 @ 11:36) (93% - 98%)  Wt(kg): --        I&O's Detail           PHYSICAL EXAM:    GENERAL: NAD  NECK: Supple, no inc in JVP  CHEST/LUNG: Clear  HEART: S1S2  ABDOMEN: Soft, Nontender, Nondistended; Bowel sounds present  EXTREMITIES:  trace edema      LABS:  CBC Full  -  ( 08 Oct 2021 07:58 )  WBC Count : 7.35 K/uL  RBC Count : 3.10 M/uL  Hemoglobin : 8.7 g/dL  Hematocrit : 27.3 %  Platelet Count - Automated : 249 K/uL  Mean Cell Volume : 88.1 fl  Mean Cell Hemoglobin : 28.1 pg  Mean Cell Hemoglobin Concentration : 31.9 gm/dL  Auto Neutrophil # : 4.99 K/uL  Auto Lymphocyte # : 1.59 K/uL  Auto Monocyte # : 0.47 K/uL  Auto Eosinophil # : 0.25 K/uL  Auto Basophil # : 0.02 K/uL  Auto Neutrophil % : 67.9 %  Auto Lymphocyte % : 21.6 %  Auto Monocyte % : 6.4 %  Auto Eosinophil % : 3.4 %  Auto Basophil % : 0.3 %    10-08    141  |  108  |  41<H>  ----------------------------<  127<H>  3.7   |  27  |  3.10<H>    Ca    8.4<L>      08 Oct 2021 07:58  Phos  6.4     10-08  Mg     2.2     10-08    TPro  6.3  /  Alb  2.7<L>  /  TBili  0.3  /  DBili  x   /  AST  13<L>  /  ALT  21  /  AlkPhos  33<L>  10-08    PT/INR - ( 06 Oct 2021 21:49 )   PT: 13.9 sec;   INR: 1.20 ratio         PTT - ( 06 Oct 2021 21:49 )  PTT:36.3 sec  Urinalysis Basic - ( 06 Oct 2021 21:49 )    Color: Yellow / Appearance: Clear / S.020 / pH: x  Gluc: x / Ketone: Negative  / Bili: Negative / Urobili: Negative   Blood: x / Protein: 500 mg/dL / Nitrite: Negative   Leuk Esterase: Negative / RBC: 3-5 /HPF / WBC 0-2   Sq Epi: x / Non Sq Epi: Occasional / Bacteria: Occasional      Culture Results:   No growth to date. (10-07 @ 03:57)  Culture Results:   No growth to date. (10-07 @ 03:57)  Culture Results:   <10,000 CFU/mL Normal Urogenital Johnna (10-07 @ 02:42)      Impression:  * ARIELLA -- DDx: pre-renal, post-renal, hemodynamic ATN due to too rapid BP normalization. Cant r/o LN  * CKD4. Cr 1.6-2 baseline  * Multifocal PNA  * SLE    Recommendations:   * Hold diuretic  * Check bladder scan  * Relax anti-hypertensives. Keeps -160  * Check complements, anti-DS DNA  * BMP in am.

## 2021-10-08 NOTE — PROGRESS NOTE ADULT - ASSESSMENT
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) living in a group home [facility] house presenting to [facility] today with multiple concerns including subjective fevers, SOB, weakness, and brief episodes of palpitations, and urinary frequency "for at least 3 days". Patient admitted for hypertensive urgency, Afib and PNA.     pna eval   ID eval noted  biomarkers - cx -   on emp ABX  TTE and CT chest reviewed  LABS reviewed  pt with CKD - Anemia -   monitor VS and Sat - wean o2 as tolerated - keep sat > 88 pct  [facility] rx regimen and BP control - pt with known HTN - AF - CAD -   DM care - serial FS -

## 2021-10-08 NOTE — PROGRESS NOTE ADULT - SUBJECTIVE AND OBJECTIVE BOX
[facility]  INFECTIOUS DISEASES   [address] [place] [address]  Tel: [phone]     Fax: [phone]  =======================================================    [id number]  TRIPP ZARATE     Follow up: multilobar pneumonia    Patient seen and examined.   Clinically has improved significantly. No fever, sitting on chair. no SOB.  Has O2 with NC. No other complaint or overnight event.     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 3.375 Gram(s) IV Intermittent every 8 hours     REVIEW OF SYSTEMS:  CONSTITUTIONAL:  No Fever or chills  HEENT:  No diplopia or blurred vision.  No sore throat or runny nose.  CARDIOVASCULAR:  No chest pain or SOB.  RESPIRATORY:  No cough, shortness of breath, PND or orthopnea.  GASTROINTESTINAL:  No nausea, vomiting or diarrhea.  GENITOURINARY:  No dysuria, frequency or urgency. No Blood in urine  MUSCULOSKELETAL:  no joint aches, no muscle pain  SKIN:  No change in skin, hair or nails.  NEUROLOGIC:  No paresthesias, fasciculations, seizures or weakness.  PSYCHIATRIC:  No disorder of thought or mood.  ENDOCRINE:  No heat or cold intolerance, polyuria or polydipsia.  HEMATOLOGICAL:  No easy bruising or bleeding.     Physical Exam:  Vital Signs Last 24 Hrs  T(C): 36.3 (08 Oct 2021 11:36), Max: 36.4 (08 Oct 2021 04:24)  T(F): 97.3 (08 Oct 2021 11:36), Max: 97.6 (08 Oct 2021 04:24)  HR: 56 (08 Oct 2021 11:36) (54 - 66)  BP: 116/70 (08 Oct 2021 11:36) (116/70 - 160/79)  BP(mean): --  RR: 19 (08 Oct 2021 11:36) (19 - 20)  SpO2: 93% (08 Oct 2021 11:36) (93% - 98%)  GEN: NAD  HEENT: normocephalic and atraumatic. EOMI. PERRL.    NECK: Supple.  No lymphadenopathy   LUNGS: Coarse crackles, o wheezing or fine rales   HEART: Irregular rate and rhythm  ABDOMEN: Soft, nontender, and nondistended.  Positive bowel sounds.    : No CVA tenderness  EXTREMITIES: 1+ edema.  NEUROLOGIC: grossly intact.  PSYCHIATRIC: Appropriate affect .  SKIN: No ulceration or induration present.    Labs:                        8.7    7.35  )-----------( 249      ( 08 Oct 2021 07:58 )             27.3     10-08    141  |  108  |  41<H>  ----------------------------<  127<H>  3.7   |  27  |  3.10<H>    Ca    8.4<L>      08 Oct 2021 07:58  Phos  6.4     10-08  Mg     2.2     10-08    TPro  6.3  /  Alb  2.7<L>  /  TBili  0.3  /  DBili  x   /  AST  13<L>  /  ALT  21  /  AlkPhos  33<L>  10-08    Culture - Blood (collected 10-07-21 @ 03:57)  Source: .Blood Blood-Peripheral    Culture - Blood (collected 10-07-21 @ 03:57)  Source: .Blood Blood-Peripheral    Culture - Urine (collected 10-07-21 @ 02:42)  Source: Clean Catch Clean Catch (Midstream)  Final Report (10-08-21 @ 12:49):    <10,000 CFU/mL Normal Urogenital Johnna    WBC Count: 7.35 K/uL (10-08-21 @ 07:58)  WBC Count: 8.57 K/uL (10-07-21 @ 04:23)  WBC Count: 8.49 K/uL (10-06-21 @ 21:49)    Creatinine, Serum: 3.10 mg/dL (10-08-21 @ 07:58)  Creatinine, Serum: 2.00 mg/dL (10-07-21 @ 04:23)  Creatinine, Serum: 2.30 mg/dL (10-06-21 @ 21:49)    Procalcitonin, Serum: 0.14 ng/mL (10-07-21 @ 04:23)     SARS-CoV-2: NotDetec (10-06-21 @ 21:49)  Rapid RVP Result: NotDetec (10-06-21 @ 21:49)    All imaging and other data have been reviewed.  < from: CT Chest No Cont (10.07.21 @ 00:03) >  IMPRESSION:  Multilobar pneumonia.  Mild cardiomegaly.  No small bowel obstruction or active bowel inflammation.    Assessment and Plan:   75 yo man with PMH of HTN, DM2, CAD, HepC, CKD and afib was admitted from [facility] with subjective fevers, SOB, weakness, and brief episodes of palpitations, and urinary frequency for 3 days.  No leukocytosis or documented fever  CT with Multilobar pneumonia  Procalcitonin 0.14     1- Multilobar pneumonia   - Blood culture x 2 NGTD  - UA and UC negative   - Follow MRSA PCR and legionella U ag  - Continue Zosyn 3.375gm q8h   - Continue azithromycin 500mg daily     Will follow.    Suzie Garner MD  Division of Infectious Diseases   Cell [phone] between 8am and 6pm   After 6pm and weekends please call ID service at [phone].

## 2021-10-08 NOTE — PROGRESS NOTE ADULT - PROBLEM SELECTOR PLAN 6
H/H 10.1/32 on admission, baseline hemoglobin 8.0-9.0  - Currently hemodynamically stable, likely related to CKD   - Iron studies on 07/21 demonstrated iron deficiency anemia   - F/u AM CBC -Patient presenting with worsening dyspnea on rest and exertion   - Appears euvolemic on exam today  - Last TTE on 07/21  Last TTE on 7/21/21 demonstrate normal left ventricular systolic function, estimated LVEF of 60%.LV diastolic dysfunction.  - On admission Pro BNP 8061 elevated compare to previous one on July 4140  - s/p IVF 2 lt bolus in the ED   - On Lasix 20 mg BID, will continue w/ renal function monitor   - Strict I&O's

## 2021-10-08 NOTE — PROGRESS NOTE ADULT - PROBLEM SELECTOR PLAN 9
- Elevated troponin in setting of hypertensive urgency  - trops trended to peak - ACS RULED OUT  - On ASA and fenofibrate,, will continue   - Monitor and replete lytes, keep K>4, Mg>2.

## 2021-10-08 NOTE — PROGRESS NOTE ADULT - ASSESSMENT
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) living in a group home [facility] house presenting to [facility] today with multiple concerns including subjective fevers, SOB, weakness, and brief episodes of palpitations, and urinary frequency "for at least 3 days". Patient admitted for hypertensive urgency, Afib and PNA.

## 2021-10-08 NOTE — PROGRESS NOTE ADULT - ATTENDING COMMENTS
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 Patient admitted for hypertensive urgency, Afib and PNA.   pt seen, examined case & care plan d/w pt, residents at detail.  AM labs   PO diet   D/W ID-Dr Garner- continue Abx   Cardio DR Gamboa-Will STOP BB as Bradycardia   -Total care time 45 minutes.

## 2021-10-08 NOTE — PROGRESS NOTE ADULT - SUBJECTIVE AND OBJECTIVE BOX
Date/Time Patient Seen:  		  Referring MD:   Data Reviewed	       Patient is a 76y old  Male who presents with a chief complaint of multilobar PNA, afib (07 Oct 2021 21:03)      Subjective/HPI     PAST MEDICAL & SURGICAL HISTORY:  Hypertension    Diabetes mellitus    Lupus    Hepatitis C    Anxiety and depression    CAD (coronary artery disease)  s/p stents    Diverticulosis    Hyperlipidemia    HTN (hypertension)  c/b multiple episodes of hypertensive urgency    HLD (hyperlipidemia)    Atrial fibrillation  first documented on EKG 10/7/2021    CAD (coronary artery disease)  s/p stents (not on plavix)    Type 2 diabetes mellitus  not on home insulin    Anxiety  multiple psych medications    History of diverticulitis  07/2021    Diverticulosis  c/b GIB in 2020    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  Had surgery ( April 2013 )    S/P tonsillectomy    S/P arthroscopic knee surgery  Bilateral ( 2005 )    Torsion of testicle  Had surgery at age 14    Pilonidal cyst  Had surgery ( 1969 )    S/P cataract surgery  Bilateral    H/O hernia repair          Medication list         MEDICATIONS  (STANDING):  amLODIPine   Tablet 10 milliGRAM(s) Oral daily  apixaban 5 milliGRAM(s) Oral every 12 hours  ARIPiprazole 30 milliGRAM(s) Oral daily  aspirin enteric coated 81 milliGRAM(s) Oral daily  azithromycin   Tablet 500 milliGRAM(s) Oral daily  budesonide 160 MICROgram(s)/formoterol 4.5 MICROgram(s) Inhaler 2 Puff(s) Inhalation two times a day  cloNIDine 0.2 milliGRAM(s) Oral three times a day  dextrose 40% Gel 15 Gram(s) Oral once  dextrose 5%. 1000 milliLiter(s) (50 mL/Hr) IV Continuous <Continuous>  dextrose 50% Injectable 25 Gram(s) IV Push once  doxazosin 4 milliGRAM(s) Oral <User Schedule>  famotidine    Tablet 20 milliGRAM(s) Oral every 48 hours  fenofibrate Tablet 145 milliGRAM(s) Oral daily  furosemide    Tablet 20 milliGRAM(s) Oral two times a day  glucagon  Injectable 1 milliGRAM(s) IntraMuscular once  hydrALAZINE 50 milliGRAM(s) Oral every 8 hours  insulin lispro (ADMELOG) corrective regimen sliding scale   SubCutaneous three times a day before meals  labetalol 200 milliGRAM(s) Oral two times a day  lamoTRIgine 100 milliGRAM(s) Oral daily  mirtazapine 30 milliGRAM(s) Oral at bedtime  nystatin Powder 1 Application(s) Topical two times a day  oxybutynin 5 milliGRAM(s) Oral four times a day  pantoprazole    Tablet 40 milliGRAM(s) Oral before breakfast  piperacillin/tazobactam IVPB.. 3.375 Gram(s) IV Intermittent every 8 hours  venlafaxine XR. 150 milliGRAM(s) Oral daily    MEDICATIONS  (PRN):  acetaminophen   Tablet .. 650 milliGRAM(s) Oral every 6 hours PRN Temp greater or equal to 38C (100.4F), Mild Pain (1 - 3)  melatonin 3 milliGRAM(s) Oral at bedtime PRN Insomnia  ondansetron Injectable 4 milliGRAM(s) IV Push every 8 hours PRN Nausea and/or Vomiting  sodium chloride 0.65% Nasal 1 Spray(s) Both Nostrils two times a day PRN Nasal Congestion         Vitals log        ICU Vital Signs Last 24 Hrs  T(C): 36.4 (08 Oct 2021 04:24), Max: 37.1 (07 Oct 2021 09:39)  T(F): 97.6 (08 Oct 2021 04:24), Max: 98.8 (07 Oct 2021 09:39)  HR: 61 (08 Oct 2021 04:24) (45 - 75)  BP: 160/79 (08 Oct 2021 04:24) (137/75 - 170/79)  BP(mean): --  ABP: --  ABP(mean): --  RR: 19 (08 Oct 2021 04:24) (16 - 19)  SpO2: 96% (08 Oct 2021 04:24) (95% - 100%)           Input and Output:  I&O's Detail      Lab Data                        9.8    8.57  )-----------( 272      ( 07 Oct 2021 04:23 )             32.2     10-07    144  |  109<H>  |  32<H>  ----------------------------<  138<H>  3.2<L>   |  25  |  2.00<H>    Ca    9.4      07 Oct 2021 04:23  Phos  4.2     10-07  Mg     2.0     10-07    TPro  7.1  /  Alb  3.1<L>  /  TBili  0.3  /  DBili  x   /  AST  19  /  ALT  24  /  AlkPhos  39<L>  10-07      CARDIAC MARKERS ( 07 Oct 2021 14:20 )  .112 ng/mL / x     / x     / x     / x      CARDIAC MARKERS ( 07 Oct 2021 04:23 )  .145 ng/mL / x     / 106 U/L / x     / 2.2 ng/mL  CARDIAC MARKERS ( 06 Oct 2021 21:51 )  .149 ng/mL / x     / x     / x     / x            Review of Systems	      Objective     Physical Examination    heart s1s2  lung dec BS  abd soft      Pertinent Lab findings & Imaging      El:  NO   Adequate UO     I&O's Detail           Discussed with:     Cultures:	        Radiology

## 2021-10-08 NOTE — PROGRESS NOTE ADULT - PROBLEM SELECTOR PLAN 5
Acute ARIELLA on CKD  - Baseline creatinine Cr 1.6 to 2.6  - On admission unknown 2.0-2.3   - Monitor BMP  - Avoid nephrotoxic medications  - Monitor renal indices  - Dr. Cayden brown following Acute ARIELLA on CKD   - Baseline creatinine Cr 1.6 to 2.6  - On admission unknown 2.0-2.3   - Creatinine elevated today   - Monitor BMP  - Avoid nephrotoxic medications  - Monitor renal indices  - Dr. Cayden brown following, recs appreciated - check bladder scan, c3 c4 complement and dsdna Acute ARIELLA on CKD   - Creatinine elevated today 3.3  - Monitor BMP  - Avoid nephrotoxic medications  - Monitor renal indices  - Dr. Cayden brown following, recs appreciated - check bladder scan, c3 c4 complement and dsdna

## 2021-10-08 NOTE — CHART NOTE - NSCHARTNOTEFT_GEN_A_CORE
Called by RN (who was informed by the tele tech) for Pt with new 1st degree heart block. Patient was asymptomatic and denying diaphoresis, chest pain, dyspnea, lightheadedness or n/v.       Assessment/Plan  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) [facility] to [facility] today with multiple concerns including subjective fevers, SOB, weakness, and brief episodes of palpitations, and urinary frequency "for at least 3 days". Patient admitted for hypertensive urgency, Afib and PNA.     #1st degree heart block  -Cardiology Dr. Gamboa following  -EKG on admission showed Afib @ 64 bpm, incompleta right BBB, LVH  -Ordered STAT EKG   -F/u morning Mag and Phos  -RN to call if there are any changes Called by RN (who was informed by the tele tech) for Pt with new 1st degree heart block. Patient was asymptomatic and denying diaphoresis, chest pain, dyspnea, lightheadedness or n/v.       Assessment/Plan  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) [facility] today with multiple concerns including subjective fevers, SOB, weakness, and brief episodes of palpitations, and urinary frequency "for at least 3 days". Patient admitted for hypertensive urgency, Afib and PNA.     #1st degree heart block  -Cardiology Dr. Gamboa following  -EKG on admission showed Afib @ 64 bpm, incompleta right BBB, LVH  -Ordered STAT EKG   -F/u morning Mag and Phos  -RN to call if there are any changes    Addendum 1:18AM  -EKG showed sinus bradycardia with 1st degree AV block with voltage criteria for left ventricular hypertrophy, nonspecific ST and T wave abnormality  -Pt was asleep on examination

## 2021-10-08 NOTE — PROGRESS NOTE ADULT - PROBLEM SELECTOR PLAN 4
likely demand ischemia in setting of hypertensive urgency   - Troponin trended to peak  - NOT ACS per cardiology Dr. Gamboa H/H 10.1/32 on admission, baseline hemoglobin 8.0-9.0  - Currently hemodynamically stable, likely related to CKD   - Iron studies on 07/21 demonstrated iron deficiency anemia   - F/u AM CBC

## 2021-10-08 NOTE — PROGRESS NOTE ADULT - SUBJECTIVE AND OBJECTIVE BOX
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 Pt states he would intermittently feel chills and feverish, recorded no temps at group home since last couple of days, he states progressively worsening of dyspnea on exertion and rest, patient unable to walk short distances or climb stairs due to dyspnea, however denies orthopnea, cough, sputum production, night sweats. Patient mentioned some dizziness associated with lower extremities weakness, usually ambulates independently but now feels unable to hold his weight. Regarding palpitations, pt states for 3 days his heart would skip a beat, return to normal. Self limited episodes under a minute, no associated chest pain. Pt also describes urinary frequency beyond baseline, denies dysuria or incontinence, hematuria, constipation.     ED course:   VS: Afebrile, HR: 80 BP: 220/100->235/116-> 189/85, Spo2: 98 on NC RR: 20  Labs significant for low stable hemoglobin, BUN/creatinine: 36/2.30, Trop 0.149, Pro BMP 8061.   EKG on admission showed Afib @ 64 bpm, incompleta right BBB, LVH  CT head shows no  evidence of acute intracranial hemorrhage, midline shift or CT evidence of acute territorial infarct.  CT chest A/P demonstrated Multilobar pneumonia. Mild cardiomegaly. No mall bowel obstruction or active bowel inflammation.  Patient was given in the ED 10 mg IVP hydralazine 10 mg Labetalol IVP 1x, 2000 cc bolus NS 1x       INTERVAL HPI:  10/07/21: Pt seen and examined at bedside; in no acute distress; complains of intermittent palpitation and transient chest pain. On Zosyn 3.375gm q8h + azithromycin 500mg daily; concern for medication compliance; ??capacity. will consult psych. K replaced  10/8/21: Pt seen and examined at bedside. Pt is lethargic and is sleeping although answers with yes and no questions. He has no acute complaints. Denies fevers, chills, chest pain, SOB, abdominal pain, n/v/d/c. On Zosyn 3.375gm q8h + azithromycin 500mg daily. Blood pressure improving. PT recommended LETI yesterday       OVERNIGHT EVENTS: Pt with episode of 1st degree heart block on tele monitor confirmed by EKG. Pt asymptomatic throughout    Home Medications:  cloNIDine 0.1 mg oral tablet: 2 tab(s) orally 3 times a day (07 Oct 2021 03:41)  digoxin 125 mcg (0.125 mg) oral tablet: 1 tab(s) orally once a day (07 Oct 2021 03:41)  Eliquis 5 mg oral tablet: 1 tab(s) orally 2 times a day (07 Oct 2021 03:41)  famotidine 40 mg oral tablet: 1 tab(s) orally once a day (at bedtime) (07 Oct 2021 03:41)  fenofibrate 145 mg oral tablet: 1 tab(s) orally once a day (07 Oct 2021 03:41)  labetalol 100 mg oral tablet: 1 tab(s) orally 2 times a day (07 Oct 2021 03:41)  Lasix 20 mg oral tablet: 1 tab(s) orally 2 times a day (07 Oct 2021 03:41)  oxybutynin 5 mg oral tablet: 1 tab(s) orally 4 times a day (07 Oct 2021 03:41)  pantoprazole 40 mg oral delayed release tablet: 1 tab(s) orally once a day (before a meal) (07 Oct 2021 03:41)  Symbicort 160 mcg-4.5 mcg/inh inhalation aerosol: 2 puff(s) inhaled 2 times a day (07 Oct 2021 03:41)      MEDICATIONS  (STANDING):  amLODIPine   Tablet 10 milliGRAM(s) Oral daily  apixaban 5 milliGRAM(s) Oral every 12 hours  ARIPiprazole 30 milliGRAM(s) Oral daily  aspirin enteric coated 81 milliGRAM(s) Oral daily  azithromycin   Tablet 500 milliGRAM(s) Oral daily  budesonide 160 MICROgram(s)/formoterol 4.5 MICROgram(s) Inhaler 2 Puff(s) Inhalation two times a day  cloNIDine 0.2 milliGRAM(s) Oral three times a day  dextrose 40% Gel 15 Gram(s) Oral once  dextrose 5%. 1000 milliLiter(s) (50 mL/Hr) IV Continuous <Continuous>  dextrose 50% Injectable 25 Gram(s) IV Push once  doxazosin 4 milliGRAM(s) Oral <User Schedule>  famotidine    Tablet 20 milliGRAM(s) Oral every 48 hours  fenofibrate Tablet 145 milliGRAM(s) Oral daily  furosemide    Tablet 20 milliGRAM(s) Oral two times a day  glucagon  Injectable 1 milliGRAM(s) IntraMuscular once  hydrALAZINE 50 milliGRAM(s) Oral every 8 hours  insulin lispro (ADMELOG) corrective regimen sliding scale   SubCutaneous three times a day before meals  labetalol 200 milliGRAM(s) Oral two times a day  lamoTRIgine 100 milliGRAM(s) Oral daily  mirtazapine 30 milliGRAM(s) Oral at bedtime  nystatin Powder 1 Application(s) Topical two times a day  oxybutynin 5 milliGRAM(s) Oral four times a day  pantoprazole    Tablet 40 milliGRAM(s) Oral before breakfast  piperacillin/tazobactam IVPB.. 3.375 Gram(s) IV Intermittent every 8 hours  venlafaxine XR. 150 milliGRAM(s) Oral daily    MEDICATIONS  (PRN):  acetaminophen   Tablet .. 650 milliGRAM(s) Oral every 6 hours PRN Temp greater or equal to 38C (100.4F), Mild Pain (1 - 3)  melatonin 3 milliGRAM(s) Oral at bedtime PRN Insomnia  ondansetron Injectable 4 milliGRAM(s) IV Push every 8 hours PRN Nausea and/or Vomiting  sodium chloride 0.65% Nasal 1 Spray(s) Both Nostrils two times a day PRN Nasal Congestion      No Known Allergies      Social History:  Tobacco: quit in , not active smoker  EtOH: denies   Recreational drug use: cocaine several years ago "a few times" has not used in "many years"  Lives with: [facility]; group home; no nursing assistance; observation is there  Ambulates: independent, denies walker or cane but uses guard railings  ADLs: independent, but states need for assistance in bathing, cooking; independent with feeding, ambulating  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[place]  Vaccinations: Moderna x2 doses last dose in May (07 Oct 2021 01:24)      REVIEW OF SYSTEMS: "I'm alright" although ROS limited due to lethargy  CONSTITUTIONAL: No fever, No chills, No fatigue, No myalgia, No Body ache, No Weakness  EYES: No eye pain,  No visual disturbances, No discharge, No Redness  ENMT: No ear pain, No nose bleed, No vertigo; No sinus pain, No throat pain, No Congestion  NECK: No pain, No stiffness  RESPIRATORY: No cough, No wheezing, No hemoptysis, No shortness of breath  CARDIOVASCULAR: No chest pain, No palpitations  GASTROINTESTINAL: No abdominal pain, No epigastric pain. No nausea, No vomiting, No diarrhea, No constipation; [  ] BM  GENITOURINARY: No dysuria, No frequency, No urgency, No hematuria, No incontinence  NEUROLOGICAL: No headaches, No dizziness, No numbness, No tingling, No tremors, No weakness  EXTREMITIES: No Swelling, No Pain, No Edema  SKIN: [x  ] No itching, burning, rashes, or lesions   MUSCULOSKELETAL: No joint pain, No joint swelling; No muscle pain, No back pain, No extremity pain  PSYCHIATRIC: No depression, No anxiety, No mood swings, No difficulty sleeping at night  PAIN SCALE: [x  ] None  [  ] Other-  ROS Unable to obtain due to: [  ] Dementia  [  ] Lethargy  [  ] Sedated  [  ] Non verbal  REST OF REVIEW OF SYSTEMS: [ x ] Normal     Vital Signs Last 24 Hrs  T(C): 36.4 (08 Oct 2021 04:24), Max: 37.1 (07 Oct 2021 09:39)  T(F): 97.6 (08 Oct 2021 04:24), Max: 98.8 (07 Oct 2021 09:39)  HR: 61 (08 Oct 2021 04:24) (56 - 71)  BP: 160/79 (08 Oct 2021 04:24) (137/75 - 170/79)  BP(mean): --  RR: 19 (08 Oct 2021 04:24) (18 - 19)  SpO2: 96% (08 Oct 2021 04:24) (95% - 97%)    CAPILLARY BLOOD GLUCOSE      POCT Blood Glucose.: 154 mg/dL (08 Oct 2021 08:00)  POCT Blood Glucose.: 139 mg/dL (07 Oct 2021 16:47)  POCT Blood Glucose.: 146 mg/dL (07 Oct 2021 11:31)      I&O's Summary    PHYSICAL EXAM:  GENERAL:  [ x ] NAD, [ x ] Well appearing, [  ] Agitated, [  ] Drowsy, [ x ] Lethargy, [  ] Confused   HEAD:  [ x] Normal, [  ] Other  EYES:  [x ] EOMI, [ x ] PERRLA, [ x ] Conjunctiva and sclera clear normal, [  ] Other, [ x ] Pallor, [  ] Discharge  ENMT:  [ x ] Normal, [ x ] Moist mucous membranes, [  ] Good dentition, [ x ] No thrush  NECK:  [ x ] Supple, [ x ] No JVD, [ x ] Normal thyroid, [  ] Lymphadenopathy, [  ] Other  CHEST/LUNG:  [ x ] Clear to auscultation bilaterally, [ x ] Breath Sounds B/L decreased, b/l  [  ] Poor effort, [x  ] No rales, [ x ] No rhonchi, [ x ] No wheezing  HEART:  [ x ] Regular rate and rhythm, [  ] Tachycardia, [  ] Bradycardia, [  ] Irregular, [ x ] No murmurs, No rubs, No gallops, [  ] PPM in place (Mfr:  )  ABDOMEN:  [ x ] Soft, [ x ] Nontender, [ x ] Nondistended, [ x ] No mass, [ x ] Bowel sounds present, [ x ] Obese  NERVOUS SYSTEM:  [ x ] Alert & Oriented x3, [ x ] Nonfocal, [  ] Confusion, [  ] Encephalopathic, [  ] Sedated, [  ] Unable to assess, [  ] Dementia, [  ] Other-  EXTREMITIES:  [ x ] 2+ Peripheral Pulses, No clubbing, No cyanosis,  [ x ] Trace Edema B/L lower EXT, [  ] PVD stasis skin changes B/L lower EXT, [  ] Wound  LYMPH:  No lymphadenopathy noted    DIET: Diet, Consistent Carbohydrate Renal w/Evening Snack:   DASH/TLC Sodium & Cholesterol Restricted (10-07-21 @ 02:46)      LABS:                        8.7    7.35  )-----------( 249      ( 08 Oct 2021 07:58 )             27.3       Ca    9.4        07 Oct 2021 04:23      PT/INR - ( 06 Oct 2021 21:49 )   PT: 13.9 sec;   INR: 1.20 ratio         PTT - ( 06 Oct 2021 21:49 )  PTT:36.3 sec  Urinalysis Basic - ( 06 Oct 2021 21:49 )    Color: Yellow / Appearance: Clear / S.020 / pH: x  Gluc: x / Ketone: Negative  / Bili: Negative / Urobili: Negative   Blood: x / Protein: 500 mg/dL / Nitrite: Negative   Leuk Esterase: Negative / RBC: 3-5 /HPF / WBC 0-2   Sq Epi: x / Non Sq Epi: Occasional / Bacteria: Occasional      Culture Results:   No growth to date. (10-07 @ 03:57)  Culture Results:   No growth to date. (10-07 @ 03:57)    culture blood  -- .Blood Blood-Peripheral 10-07 @ 03:57    culture urine  --  10-07 @ 03:57    CARDIAC MARKERS ( 07 Oct 2021 14:20 )  .112 ng/mL / x     / x     / x     / x      CARDIAC MARKERS ( 07 Oct 2021 04:23 )  .145 ng/mL / x     / 106 U/L / x     / 2.2 ng/mL  CARDIAC MARKERS ( 06 Oct 2021 21:51 )  .149 ng/mL / x     / x     / x     / x            Culture - Blood (collected 07 Oct 2021 03:57)  Source: .Blood Blood-Peripheral  Preliminary Report (08 Oct 2021 04:00):    No growth to date.    Culture - Blood (collected 07 Oct 2021 03:57)  Source: .Blood Blood-Peripheral  Preliminary Report (08 Oct 2021 04:00):    No growth to date.       Anemia Panel:      Thyroid Panel:  Thyroid Stimulating Hormone, Serum: 1.35 uIU/mL (10-07-21 @ 04:23)            Serum Pro-Brain Natriuretic Peptide: 7438 pg/mL (10-07-21 @ 04:23)  Serum Pro-Brain Natriuretic Peptide: 8061 pg/mL (10-06-21 @ 21:49)      RADIOLOGY & ADDITIONAL TESTS:      HEALTH ISSUES - PROBLEM Dx:  PNA (pneumonia)    Elevated troponin    Chronic atrial fibrillation    Stage 3 chronic kidney disease    Chronic diastolic congestive heart failure    CAD (coronary artery disease)    DVT prophylaxis    Anemia of chronic disease    DM (diabetes mellitus), type 2    Depression, major    Abnormal CT scan of lung    Hypertensive urgency    Malignant hypertensive urgency          Consultant(s) Notes Reviewed:  [ x ] YES     Care Discussed with [ x ] Consultants, [ x ] Patient, [  ] Family, [  ] HCP, [  ] , [  ] Social Service, [ x ] RN, [  ] Physical Therapy, [  ] Palliative Care Team  DVT PPX: [  ] Lovenox, [  ] SC Heparin, [  ] Coumadin, [  ] Xarelto, [ x ] Eliquis, [  ] Pradaxa, [  ] IV Heparin drip, [  ] SCD, [  ] Ambulation, [  ] Contraindicated 2/2 GI Bleed, [  ] Contraindicated 2/2  Bleed, [  ] Contraindicated 2/2 Brain Bleed  Advanced Directive: [ x ] None, [  ] DNR/DNI Patient is a 76y old  Male who presents with a chief complaint of multilobar PNA, afib (08 Oct 2021 06:33)    History of Present Illness:   77 yo M with PMHx of Type 2 DM (not on insulin), BPH, CAD s/p stents >4 years ago (not on plavix), diverticulitis (hospitalized [facility] at [facility] GIB 2/2 diverticulosis (), anxiety, Lupus, HTN, HLD, CKD stage 3, HepC, 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 Pt states he would intermittently feel chills and feverish, recorded no temps at group home since last couple of days, he states progressively worsening of dyspnea on exertion and rest, patient unable to walk short distances or climb stairs due to dyspnea, however denies orthopnea, cough, sputum production, night sweats. Patient mentioned some dizziness associated with lower extremities weakness, usually ambulates independently but now feels unable to hold his weight. Regarding palpitations, pt states for 3 days his heart would skip a beat, return to normal. Self limited episodes under a minute, no associated chest pain. Pt also describes urinary frequency beyond baseline, denies dysuria or incontinence, hematuria, constipation.     ED course:   VS: Afebrile, HR: 80 BP: 220/100->235/116-> 189/85, Spo2: 98 on NC RR: 20  Labs significant for low stable hemoglobin, BUN/creatinine: 36/2.30, Trop 0.149, Pro BMP 8061.   EKG on admission showed Afib @ 64 bpm, incompleta right BBB, LVH  CT head shows no  evidence of acute intracranial hemorrhage, midline shift or CT evidence of acute territorial infarct.  CT chest A/P demonstrated Multilobar pneumonia. Mild cardiomegaly. No mall bowel obstruction or active bowel inflammation.  Patient was given in the ED 10 mg IVP hydralazine 10 mg Labetalol IVP 1x, 2000 cc bolus NS 1x       INTERVAL HPI:  10/07/21: Pt seen and examined at bedside; in no acute distress; complains of intermittent palpitation and transient chest pain. On Zosyn 3.375gm q8h + azithromycin 500mg daily; concern for medication compliance; ??capacity. will consult psych. K replaced  10/8/21: Pt seen and examined at bedside. Pt is lethargic and is sleeping although answers with yes and no questions. He has no acute complaints. Denies fevers, chills, chest pain, SOB, abdominal pain, n/v/d/c. On Zosyn 3.375gm q8h + azithromycin 500mg daily. Blood pressure improving. PT recommended LETI yesterday .      OVERNIGHT EVENTS: Pt with episode of 1st degree heart block on tele monitor confirmed by EKG. Pt asymptomatic throughout    Home Medications:  cloNIDine 0.1 mg oral tablet: 2 tab(s) orally 3 times a day (07 Oct 2021 03:41)  digoxin 125 mcg (0.125 mg) oral tablet: 1 tab(s) orally once a day (07 Oct 2021 03:41)  Eliquis 5 mg oral tablet: 1 tab(s) orally 2 times a day (07 Oct 2021 03:41)  famotidine 40 mg oral tablet: 1 tab(s) orally once a day (at bedtime) (07 Oct 2021 03:41)  fenofibrate 145 mg oral tablet: 1 tab(s) orally once a day (07 Oct 2021 03:41)  labetalol 100 mg oral tablet: 1 tab(s) orally 2 times a day (07 Oct 2021 03:41)  Lasix 20 mg oral tablet: 1 tab(s) orally 2 times a day (07 Oct 2021 03:41)  oxybutynin 5 mg oral tablet: 1 tab(s) orally 4 times a day (07 Oct 2021 03:41)  pantoprazole 40 mg oral delayed release tablet: 1 tab(s) orally once a day (before a meal) (07 Oct 2021 03:41)  Symbicort 160 mcg-4.5 mcg/inh inhalation aerosol: 2 puff(s) inhaled 2 times a day (07 Oct 2021 03:41)      MEDICATIONS  (STANDING):  amLODIPine   Tablet 10 milliGRAM(s) Oral daily  apixaban 5 milliGRAM(s) Oral every 12 hours  ARIPiprazole 30 milliGRAM(s) Oral daily  aspirin enteric coated 81 milliGRAM(s) Oral daily  azithromycin   Tablet 500 milliGRAM(s) Oral daily  budesonide 160 MICROgram(s)/formoterol 4.5 MICROgram(s) Inhaler 2 Puff(s) Inhalation two times a day  cloNIDine 0.2 milliGRAM(s) Oral three times a day  dextrose 40% Gel 15 Gram(s) Oral once  dextrose 5%. 1000 milliLiter(s) (50 mL/Hr) IV Continuous <Continuous>  dextrose 50% Injectable 25 Gram(s) IV Push once  doxazosin 4 milliGRAM(s) Oral <User Schedule>  famotidine    Tablet 20 milliGRAM(s) Oral every 48 hours  fenofibrate Tablet 145 milliGRAM(s) Oral daily  furosemide    Tablet 20 milliGRAM(s) Oral two times a day  glucagon  Injectable 1 milliGRAM(s) IntraMuscular once  hydrALAZINE 50 milliGRAM(s) Oral every 8 hours  insulin lispro (ADMELOG) corrective regimen sliding scale   SubCutaneous three times a day before meals  labetalol 200 milliGRAM(s) Oral two times a day  lamoTRIgine 100 milliGRAM(s) Oral daily  mirtazapine 30 milliGRAM(s) Oral at bedtime  nystatin Powder 1 Application(s) Topical two times a day  oxybutynin 5 milliGRAM(s) Oral four times a day  pantoprazole    Tablet 40 milliGRAM(s) Oral before breakfast  piperacillin/tazobactam IVPB.. 3.375 Gram(s) IV Intermittent every 8 hours  venlafaxine XR. 150 milliGRAM(s) Oral daily    MEDICATIONS  (PRN):  acetaminophen   Tablet .. 650 milliGRAM(s) Oral every 6 hours PRN Temp greater or equal to 38C (100.4F), Mild Pain (1 - 3)  melatonin 3 milliGRAM(s) Oral at bedtime PRN Insomnia  ondansetron Injectable 4 milliGRAM(s) IV Push every 8 hours PRN Nausea and/or Vomiting  sodium chloride 0.65% Nasal 1 Spray(s) Both Nostrils two times a day PRN Nasal Congestion      No Known Allergies      Social History:  Tobacco: quit in , not active smoker  EtOH: denies   Recreational drug use: cocaine several years ago "a few times" has not used in "many years"  Lives with: [facility]; group home; no nursing assistance; observation is there  Ambulates: independent, denies walker or cane but uses guard railings  ADLs: independent, but states need for assistance in bathing, cooking; independent with feeding, ambulating  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[place]  Vaccinations: Moderna x2 doses last dose in May (07 Oct 2021 01:24)      REVIEW OF SYSTEMS: "I'm alright" although ROS limited due to lethargy  CONSTITUTIONAL: No fever, No chills, No fatigue, No myalgia, No Body ache, No Weakness  EYES: No eye pain,  No visual disturbances, No discharge, No Redness  ENMT: No ear pain, No nose bleed, No vertigo; No sinus pain, No throat pain, No Congestion  NECK: No pain, No stiffness  RESPIRATORY: No cough, No wheezing, No hemoptysis, No shortness of breath  CARDIOVASCULAR: No chest pain, No palpitations  GASTROINTESTINAL: No abdominal pain, No epigastric pain. No nausea, No vomiting, No diarrhea, No constipation; [  ] BM  GENITOURINARY: No dysuria, No frequency, No urgency, No hematuria, No incontinence  NEUROLOGICAL: No headaches, No dizziness, No numbness, No tingling, No tremors, No weakness  EXTREMITIES: No Swelling, No Pain, No Edema  SKIN: [x  ] No itching, burning, rashes, or lesions   MUSCULOSKELETAL: No joint pain, No joint swelling; No muscle pain, No back pain, No extremity pain  PSYCHIATRIC: No depression, No anxiety, No mood swings, No difficulty sleeping at night  PAIN SCALE: [x  ] None  [  ] Other-  ROS Unable to obtain due to: [  ] Dementia  [  ] Lethargy  [  ] Sedated  [  ] Non verbal  REST OF REVIEW OF SYSTEMS: [ x ] Normal     Vital Signs Last 24 Hrs  T(C): 36.4 (08 Oct 2021 04:24), Max: 37.1 (07 Oct 2021 09:39)  T(F): 97.6 (08 Oct 2021 04:24), Max: 98.8 (07 Oct 2021 09:39)  HR: 61 (08 Oct 2021 04:24) (56 - 71)  BP: 160/79 (08 Oct 2021 04:24) (137/75 - 170/79)  BP(mean): --  RR: 19 (08 Oct 2021 04:24) (18 - 19)  SpO2: 96% (08 Oct 2021 04:24) (95% - 97%)    CAPILLARY BLOOD GLUCOSE      POCT Blood Glucose.: 154 mg/dL (08 Oct 2021 08:00)  POCT Blood Glucose.: 139 mg/dL (07 Oct 2021 16:47)  POCT Blood Glucose.: 146 mg/dL (07 Oct 2021 11:31)      I&O's Summary    PHYSICAL EXAM:  GENERAL:  [ x ] NAD, [ x ] Well appearing, [  ] Agitated, [  ] Drowsy, [ x ] Lethargy, [  ] Confused   HEAD:  [ x] Normal, [  ] Other  EYES:  [x ] EOMI, [ x ] PERRLA, [ x ] Conjunctiva and sclera clear normal, [  ] Other, [ x ] Pallor, [  ] Discharge  ENMT:  [ x ] Normal, [ x ] Moist mucous membranes, [  ] Good dentition, [ x ] No thrush  NECK:  [ x ] Supple, [ x ] No JVD, [ x ] Normal thyroid, [  ] Lymphadenopathy, [  ] Other  CHEST/LUNG:  [ x ] Clear to auscultation bilaterally, [ x ] Breath Sounds B/L decreased, b/l  [  ] Poor effort, [x  ] No rales, [ x ] No rhonchi, [ x ] No wheezing  HEART:  [ x ] Regular rate and rhythm, [  ] Tachycardia, [  ] Bradycardia, [  ] Irregular, [ x ] No murmurs, No rubs, No gallops, [  ] PPM in place (Mfr:  )  ABDOMEN:  [ x ] Soft, [ x ] Nontender, [ x ] Nondistended, [ x ] No mass, [ x ] Bowel sounds present, [ x ] Obese  NERVOUS SYSTEM:  [ x ] Alert & Oriented x3, [ x ] Nonfocal, [  ] Confusion, [  ] Encephalopathic, [  ] Sedated, [  ] Unable to assess, [  ] Dementia, [  ] Other-  EXTREMITIES:  [ x ] 2+ Peripheral Pulses, No clubbing, No cyanosis,  [ x ] Trace Edema B/L lower EXT, [  ] PVD stasis skin changes B/L lower EXT, [  ] Wound  LYMPH:  No lymphadenopathy noted    DIET: Diet, Consistent Carbohydrate Renal w/Evening Snack:   DASH/TLC Sodium & Cholesterol Restricted (10-07-21 @ 02:46)      LABS:                        8.7    7.35  )-----------( 249      ( 08 Oct 2021 07:58 )             27.3       Ca    9.4        07 Oct 2021 04:23      PT/INR - ( 06 Oct 2021 21:49 )   PT: 13.9 sec;   INR: 1.20 ratio         PTT - ( 06 Oct 2021 21:49 )  PTT:36.3 sec  Urinalysis Basic - ( 06 Oct 2021 21:49 )    Color: Yellow / Appearance: Clear / S.020 / pH: x  Gluc: x / Ketone: Negative  / Bili: Negative / Urobili: Negative   Blood: x / Protein: 500 mg/dL / Nitrite: Negative   Leuk Esterase: Negative / RBC: 3-5 /HPF / WBC 0-2   Sq Epi: x / Non Sq Epi: Occasional / Bacteria: Occasional      Culture Results:   No growth to date. (10-07 @ 03:57)  Culture Results:   No growth to date. (10-07 @ 03:57)    culture blood  -- .Blood Blood-Peripheral 10-07 @ 03:57    culture urine  --  10-07 @ 03:57    CARDIAC MARKERS ( 07 Oct 2021 14:20 )  .112 ng/mL / x     / x     / x     / x      CARDIAC MARKERS ( 07 Oct 2021 04:23 )  .145 ng/mL / x     / 106 U/L / x     / 2.2 ng/mL  CARDIAC MARKERS ( 06 Oct 2021 21:51 )  .149 ng/mL / x     / x     / x     / x            Culture - Blood (collected 07 Oct 2021 03:57)  Source: .Blood Blood-Peripheral  Preliminary Report (08 Oct 2021 04:00):    No growth to date.    Culture - Blood (collected 07 Oct 2021 03:57)  Source: .Blood Blood-Peripheral  Preliminary Report (08 Oct 2021 04:00):    No growth to date.       Anemia Panel:      Thyroid Panel:  Thyroid Stimulating Hormone, Serum: 1.35 uIU/mL (10-07-21 @ 04:23)            Serum Pro-Brain Natriuretic Peptide: 7438 pg/mL (10-07-21 @ 04:23)  Serum Pro-Brain Natriuretic Peptide: 8061 pg/mL (10-06-21 @ 21:49)      RADIOLOGY & ADDITIONAL TESTS:      HEALTH ISSUES - PROBLEM Dx:  PNA (pneumonia)    Elevated troponin    Chronic atrial fibrillation    Stage 3 chronic kidney disease    Chronic diastolic congestive heart failure    CAD (coronary artery disease)    DVT prophylaxis    Anemia of chronic disease    DM (diabetes mellitus), type 2    Depression, major    Abnormal CT scan of lung    Hypertensive urgency    Malignant hypertensive urgency      Consultant(s) Notes Reviewed:  [ x ] YES     Care Discussed with [ x ] Consultants, [ x ] Patient, [  ] Family, [  ] HCP, [  ] , [  ] Social Service, [ x ] RN, [  ] Physical Therapy, [  ] Palliative Care Team  DVT PPX: [  ] Lovenox, [  ] SC Heparin, [  ] Coumadin, [  ] Xarelto, [ x ] Eliquis, [  ] Pradaxa, [  ] IV Heparin drip, [  ] SCD, [  ] Ambulation, [  ] Contraindicated 2/2 GI Bleed, [  ] Contraindicated 2/2  Bleed, [  ] Contraindicated 2/2 Brain Bleed  Advanced Directive: [ x ] None, [  ] DNR/DNI Patient is a 76y old  Male who presents with a chief complaint of multilobar PNA, afib (08 Oct 2021 06:33)    History of Present Illness:   75 yo M with PMHx of Type 2 DM (not on insulin), BPH, CAD s/p stents >4 years ago (not on plavix), diverticulitis (hospitalized 2020 at [facility]), GIB 2/2 diverticulosis (), anxiety, Lupus, HTN, HLD, CKD stage 3, HepC, 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 Pt states he would intermittently feel chills and feverish, recorded no temps at group home since last couple of days, he states progressively worsening of dyspnea on exertion and rest, patient unable to walk short distances or climb stairs due to dyspnea, however denies orthopnea, cough, sputum production, night sweats. Patient mentioned some dizziness associated with lower extremities weakness, usually ambulates independently but now feels unable to hold his weight. Regarding palpitations, pt states for 3 days his heart would skip a beat, return to normal. Self limited episodes under a minute, no associated chest pain. Pt also describes urinary frequency beyond baseline, denies dysuria or incontinence, hematuria, constipation.     ED course:   VS: Afebrile, HR: 80 BP: 220/100->235/116-> 189/85, Spo2: 98 on NC RR: 20  Labs significant for low stable hemoglobin, BUN/creatinine: 36/2.30, Trop 0.149, Pro BMP 8061.   EKG on admission showed Afib @ 64 bpm, incompleta right BBB, LVH  CT head shows no  evidence of acute intracranial hemorrhage, midline shift or CT evidence of acute territorial infarct.  CT chest A/P demonstrated Multilobar pneumonia. Mild cardiomegaly. No mall bowel obstruction or active bowel inflammation.  Patient was given in the ED 10 mg IVP hydralazine 10 mg Labetalol IVP 1x, 2000 cc bolus NS 1x       INTERVAL HPI:  10/07/21: Pt seen and examined at bedside; in no acute distress; complains of intermittent palpitation and transient chest pain. On Zosyn 3.375gm q8h + azithromycin 500mg daily; concern for medication compliance; ??capacity. will consult psych. K replaced  10/8/21: Pt seen and examined at bedside. Pt is lethargic and is sleeping although answers with yes and no questions. He has no acute complaints. Denies fevers, chills, chest pain, SOB, abdominal pain, n/v/d/c. On Zosyn 3.375gm q8h + azithromycin 500mg daily. Blood pressure improving. PT recommended LETI yesterday .      OVERNIGHT EVENTS: Pt with episode of 1st degree heart block on tele monitor confirmed by EKG. Pt asymptomatic throughout    Home Medications:  cloNIDine 0.1 mg oral tablet: 2 tab(s) orally 3 times a day (07 Oct 2021 03:41)  digoxin 125 mcg (0.125 mg) oral tablet: 1 tab(s) orally once a day (07 Oct 2021 03:41)  Eliquis 5 mg oral tablet: 1 tab(s) orally 2 times a day (07 Oct 2021 03:41)  famotidine 40 mg oral tablet: 1 tab(s) orally once a day (at bedtime) (07 Oct 2021 03:41)  fenofibrate 145 mg oral tablet: 1 tab(s) orally once a day (07 Oct 2021 03:41)  labetalol 100 mg oral tablet: 1 tab(s) orally 2 times a day (07 Oct 2021 03:41)  Lasix 20 mg oral tablet: 1 tab(s) orally 2 times a day (07 Oct 2021 03:41)  oxybutynin 5 mg oral tablet: 1 tab(s) orally 4 times a day (07 Oct 2021 03:41)  pantoprazole 40 mg oral delayed release tablet: 1 tab(s) orally once a day (before a meal) (07 Oct 2021 03:41)  Symbicort 160 mcg-4.5 mcg/inh inhalation aerosol: 2 puff(s) inhaled 2 times a day (07 Oct 2021 03:41)      MEDICATIONS  (STANDING):  amLODIPine   Tablet 10 milliGRAM(s) Oral daily  apixaban 5 milliGRAM(s) Oral every 12 hours  ARIPiprazole 30 milliGRAM(s) Oral daily  aspirin enteric coated 81 milliGRAM(s) Oral daily  azithromycin   Tablet 500 milliGRAM(s) Oral daily  budesonide 160 MICROgram(s)/formoterol 4.5 MICROgram(s) Inhaler 2 Puff(s) Inhalation two times a day  cloNIDine 0.2 milliGRAM(s) Oral three times a day  dextrose 40% Gel 15 Gram(s) Oral once  dextrose 5%. 1000 milliLiter(s) (50 mL/Hr) IV Continuous <Continuous>  dextrose 50% Injectable 25 Gram(s) IV Push once  doxazosin 4 milliGRAM(s) Oral <User Schedule>  famotidine    Tablet 20 milliGRAM(s) Oral every 48 hours  fenofibrate Tablet 145 milliGRAM(s) Oral daily  furosemide    Tablet 20 milliGRAM(s) Oral two times a day  glucagon  Injectable 1 milliGRAM(s) IntraMuscular once  hydrALAZINE 50 milliGRAM(s) Oral every 8 hours  insulin lispro (ADMELOG) corrective regimen sliding scale   SubCutaneous three times a day before meals  labetalol 200 milliGRAM(s) Oral two times a day  lamoTRIgine 100 milliGRAM(s) Oral daily  mirtazapine 30 milliGRAM(s) Oral at bedtime  nystatin Powder 1 Application(s) Topical two times a day  oxybutynin 5 milliGRAM(s) Oral four times a day  pantoprazole    Tablet 40 milliGRAM(s) Oral before breakfast  piperacillin/tazobactam IVPB.. 3.375 Gram(s) IV Intermittent every 8 hours  venlafaxine XR. 150 milliGRAM(s) Oral daily    MEDICATIONS  (PRN):  acetaminophen   Tablet .. 650 milliGRAM(s) Oral every 6 hours PRN Temp greater or equal to 38C (100.4F), Mild Pain (1 - 3)  melatonin 3 milliGRAM(s) Oral at bedtime PRN Insomnia  ondansetron Injectable 4 milliGRAM(s) IV Push every 8 hours PRN Nausea and/or Vomiting  sodium chloride 0.65% Nasal 1 Spray(s) Both Nostrils two times a day PRN Nasal Congestion      No Known Allergies      Social History:  Tobacco: quit in , not active smoker  EtOH: denies   Recreational drug use: cocaine several years ago "a few times" has not used in "many years"  Lives with: [facility]; group home; no nursing assistance; observation is there  Ambulates: independent, denies walker or cane but uses guard railings  ADLs: independent, but states need for assistance in bathing, cooking; independent with feeding, ambulating  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[place]  Vaccinations: Moderna x2 doses last dose in May (07 Oct 2021 01:24)      REVIEW OF SYSTEMS: "I'm alright" although ROS limited due to lethargy  CONSTITUTIONAL: No fever, No chills, No fatigue, No myalgia, No Body ache, No Weakness  EYES: No eye pain,  No visual disturbances, No discharge, No Redness  ENMT: No ear pain, No nose bleed, No vertigo; No sinus pain, No throat pain, No Congestion  NECK: No pain, No stiffness  RESPIRATORY: No cough, No wheezing, No hemoptysis, No shortness of breath  CARDIOVASCULAR: No chest pain, No palpitations  GASTROINTESTINAL: No abdominal pain, No epigastric pain. No nausea, No vomiting, No diarrhea, No constipation; [  ] BM  GENITOURINARY: No dysuria, No frequency, No urgency, No hematuria, No incontinence  NEUROLOGICAL: No headaches, No dizziness, No numbness, No tingling, No tremors, No weakness  EXTREMITIES: No Swelling, No Pain, No Edema  SKIN: [x  ] No itching, burning, rashes, or lesions   MUSCULOSKELETAL: No joint pain, No joint swelling; No muscle pain, No back pain, No extremity pain  PSYCHIATRIC: No depression, No anxiety, No mood swings, No difficulty sleeping at night  PAIN SCALE: [x  ] None  [  ] Other-  ROS Unable to obtain due to: [  ] Dementia  [  ] Lethargy  [  ] Sedated  [  ] Non verbal  REST OF REVIEW OF SYSTEMS: [ x ] Normal     Vital Signs Last 24 Hrs  T(C): 36.4 (08 Oct 2021 04:24), Max: 37.1 (07 Oct 2021 09:39)  T(F): 97.6 (08 Oct 2021 04:24), Max: 98.8 (07 Oct 2021 09:39)  HR: 61 (08 Oct 2021 04:24) (56 - 71)  BP: 160/79 (08 Oct 2021 04:24) (137/75 - 170/79)  BP(mean): --  RR: 19 (08 Oct 2021 04:24) (18 - 19)  SpO2: 96% (08 Oct 2021 04:24) (95% - 97%)    CAPILLARY BLOOD GLUCOSE      POCT Blood Glucose.: 154 mg/dL (08 Oct 2021 08:00)  POCT Blood Glucose.: 139 mg/dL (07 Oct 2021 16:47)  POCT Blood Glucose.: 146 mg/dL (07 Oct 2021 11:31)      I&O's Summary    PHYSICAL EXAM:  GENERAL:  [ x ] NAD, [ x ] Well appearing, [  ] Agitated, [  ] Drowsy, [ x ] Lethargy, [  ] Confused   HEAD:  [ x] Normal, [  ] Other  EYES:  [x ] EOMI, [ x ] PERRLA, [ x ] Conjunctiva and sclera clear normal, [  ] Other, [ x ] Pallor, [  ] Discharge  ENMT:  [ x ] Normal, [ x ] Moist mucous membranes, [  ] Good dentition, [ x ] No thrush  NECK:  [ x ] Supple, [ x ] No JVD, [ x ] Normal thyroid, [  ] Lymphadenopathy, [  ] Other  CHEST/LUNG:  [ x ] Clear to auscultation bilaterally, [ x ] Breath Sounds B/L decreased, b/l  [  ] Poor effort, [x  ] No rales, [ x ] No rhonchi, [ x ] No wheezing  HEART:  [ x ] Regular rate and rhythm, [  ] Tachycardia, [  ] Bradycardia, [  ] Irregular, [ x ] No murmurs, No rubs, No gallops, [  ] PPM in place (Mfr:  )  ABDOMEN:  [ x ] Soft, [ x ] Nontender, [ x ] Nondistended, [ x ] No mass, [ x ] Bowel sounds present, [ x ] Obese  NERVOUS SYSTEM:  [ x ] Alert & Oriented x3, [ x ] Nonfocal, [  ] Confusion, [  ] Encephalopathic, [  ] Sedated, [  ] Unable to assess, [  ] Dementia, [  ] Other-  EXTREMITIES:  [ x ] 2+ Peripheral Pulses, No clubbing, No cyanosis,  [ x ] Trace Edema B/L lower EXT, [  ] PVD stasis skin changes B/L lower EXT, [  ] Wound  LYMPH:  No lymphadenopathy noted  SKIN:  [ x ] No rashes or lesions, [  ] Pressure ulcers, [  ] Ecchymosis, [  ] Skin tears, [ x ] Other- Tattoos on EXT       DIET: Diet, Consistent Carbohydrate Renal w/Evening Snack:   DASH/TLC Sodium & Cholesterol Restricted (10-07-21 @ 02:46)      LABS:                        8.7    7.35  )-----------( 249      ( 08 Oct 2021 07:58 )             27.3       Ca    9.4        07 Oct 2021 04:23      PT/INR - ( 06 Oct 2021 21:49 )   PT: 13.9 sec;   INR: 1.20 ratio         PTT - ( 06 Oct 2021 21:49 )  PTT:36.3 sec  Urinalysis Basic - ( 06 Oct 2021 21:49 )    Color: Yellow / Appearance: Clear / S.020 / pH: x  Gluc: x / Ketone: Negative  / Bili: Negative / Urobili: Negative   Blood: x / Protein: 500 mg/dL / Nitrite: Negative   Leuk Esterase: Negative / RBC: 3-5 /HPF / WBC 0-2   Sq Epi: x / Non Sq Epi: Occasional / Bacteria: Occasional      Culture Results:   No growth to date. (10-07 @ 03:57)  Culture Results:   No growth to date. (10-07 @ 03:57)    culture blood  -- .Blood Blood-Peripheral 10-07 @ 03:57    culture urine  --  10-07 @ 03:57    CARDIAC MARKERS ( 07 Oct 2021 14:20 )  .112 ng/mL / x     / x     / x     / x      CARDIAC MARKERS ( 07 Oct 2021 04:23 )  .145 ng/mL / x     / 106 U/L / x     / 2.2 ng/mL  CARDIAC MARKERS ( 06 Oct 2021 21:51 )  .149 ng/mL / x     / x     / x     / x            Culture - Blood (collected 07 Oct 2021 03:57)  Source: .Blood Blood-Peripheral  Preliminary Report (08 Oct 2021 04:00):    No growth to date.    Culture - Blood (collected 07 Oct 2021 03:57)  Source: .Blood Blood-Peripheral  Preliminary Report (08 Oct 2021 04:00):    No growth to date.       Anemia Panel:      Thyroid Panel:  Thyroid Stimulating Hormone, Serum: 1.35 uIU/mL (10-07-21 @ 04:23)            Serum Pro-Brain Natriuretic Peptide: 7438 pg/mL (10-07-21 @ 04:23)  Serum Pro-Brain Natriuretic Peptide: 8061 pg/mL (10-06-21 @ 21:49)      RADIOLOGY & ADDITIONAL TESTS:      HEALTH ISSUES - PROBLEM Dx:  PNA (pneumonia)    Elevated troponin    Chronic atrial fibrillation    Stage 3 chronic kidney disease    Chronic diastolic congestive heart failure    CAD (coronary artery disease)    DVT prophylaxis    Anemia of chronic disease    DM (diabetes mellitus), type 2    Depression, major    Abnormal CT scan of lung    Hypertensive urgency    Malignant hypertensive urgency      Consultant(s) Notes Reviewed:  [ x ] YES     Care Discussed with [ x ] Consultants, [ x ] Patient, [  ] Family, [  ] HCP, [  ] , [  ] Social Service, [ x ] RN, [  ] Physical Therapy, [  ] Palliative Care Team  DVT PPX: [  ] Lovenox, [  ] SC Heparin, [  ] Coumadin, [  ] Xarelto, [ x ] Eliquis, [  ] Pradaxa, [  ] IV Heparin drip, [  ] SCD, [  ] Ambulation, [  ] Contraindicated 2/2 GI Bleed, [  ] Contraindicated 2/2  Bleed, [  ] Contraindicated 2/2 Brain Bleed  Advanced Directive: [ x ] None, [  ] DNR/DNI

## 2021-10-08 NOTE — PHARMACOTHERAPY INTERVENTION NOTE - COMMENTS
Patient is on order for Eliquis 5 mg BID for afib with an eGFR=19.  Discussed with Dr. Haseeb Gamboa that DOACs are not recommended in egfr < 30 because of lack of studies.  MD aware and will continue. Patient is on order for Eliquis 5 mg BID (home med) for afib with an eGFR=19.  Discussed with Dr. Haseeb Gamboa that DOACs are not recommended in egfr < 30 because of lack of studies.  MD aware and will continue. Patient is ordered for Eliquis 5 mg BID (home med) for afib with an eGFR=19.  Discussed with Dr. aHseeb Gamboa that DOACs are not recommended in egfr < 30 because of lack of studies.  MD aware and will continue.

## 2021-10-08 NOTE — PROGRESS NOTE ADULT - PROBLEM SELECTOR PLAN 3
Afib   - Patient presents with new onset Afib, however pt's PCP prescribed digoxin, Eliquis by PCP on sept 14/21  - EKG on admission showed Afib @ 64 bpm, incompleta right BBB, LVH   - overnight per remote tele patient demonstrated signs of first degree heart block confirmed by EKG although pt was asymptomatic throughout  - Last TTE on 7/21/21 demonstrate normal left ventricular systolic function, estimated LVEF of 60%.LV diastolic dysfunction.  - On , Eliquis labetalol, will continue, on  digoxin level 0.6  - Will continue with Eliquis 5mg BID   - Monitor lytes and replete as needed  - Continuous cardiac monitoring  - Cardiology Dr Gamboa called by ER, f/u daily recs Afib   - Patient presents with new onset Afib, however pt's PCP prescribed digoxin, Eliquis by PCP on sept 14/21  - EKG on admission showed Afib @ 64 bpm, incompleta right BBB, LVH   - overnight per remote tele patient demonstrated signs of first degree heart block confirmed by EKG although pt was asymptomatic throughout  - Last TTE on 7/21/21 demonstrate normal left ventricular systolic function, estimated LVEF of 60%.LV diastolic dysfunction.  - On , Eliquis labetalol, will continue, STOP digoxin- level 0.6  - Will continue with Eliquis 5mg BID   - BMP in AM   - Continuous Tele   - Cardiology Dr Gamboa called by ER, f/u daily recs- Bradycardia

## 2021-10-08 NOTE — PROGRESS NOTE ADULT - PROBLEM SELECTOR PLAN 1
Multilobar PNA  - On admission does not meet sepsis criteria; afebrile, WBC WNL, normal lactate   - RIVP negative on admission   - CT Chest showed branching nodular opacities and patchy airspace opacities noted in the lingula and bilateral lower lobes which are likely infectious in etiology. No pleural effusions are present.   - Given 1g Rocephin and Azithromycin and IVF 2100 mL NS bolus in the ED   - Will continue IV 3.375 zosyn q8hrs with azithromycin 500 mg qd per ID Dr. Garner  - F/U BCx NGTD, legionella strep urine antigens  - F/up procalcitonin --> elevated 0.14 Multilobar PNA  - On admission does not meet sepsis criteria; afebrile, WBC WNL, normal lactate   - RIVP negative on admission   - CT Chest showed branching nodular opacities and patchy airspace opacities noted in the lingula and bilateral lower lobes which are likely infectious in etiology. No pleural effusions are present.   - Given 1g Rocephin and Azithromycin   - Will continue IV 3.375 zosyn q8hrs with azithromycin 500 mg qd per ID Dr. Garner  - F/U BCx NGTD, legionella strep urine antigens  - F/up procalcitonin --> elevated 0.14  CBC in AM

## 2021-10-08 NOTE — PROGRESS NOTE ADULT - PROBLEM SELECTOR PROBLEM 7
Chronic diastolic congestive heart failure Elevated troponin Closure 2 Information: This tab is for additional flaps and grafts, including complex repair and grafts and complex repair and flaps. You can also specify a different location for the additional defect, if the location is the same you do not need to select a new one. We will insert the automated text for the repair you select below just as we do for solitary flaps and grafts. Please note that at this time if you select a location with a different insurance zone you will need to override the ICD10 and CPT if appropriate.

## 2021-10-08 NOTE — PROGRESS NOTE ADULT - SUBJECTIVE AND OBJECTIVE BOX
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 Pt states he would intermittently feel chills and feverish, recorded no temps at group home since last couple of days, he states progressively worsening of dyspnea on exertion and rest, patient unable to walk short distances or climb stairs due to dyspnea, however denies orthopnea, cough, sputum production, night sweats. Patient mentioned some dizziness associated with lower extremities weakness, usually ambulates independently but now feels unable to hold his weight. Regarding palpitations, pt states for 3 days his heart would skip a beat, return to normal. Self limited episodes under a minute, no associated chest pain. Pt also describes urinary frequency beyond baseline, denies dysuria or incontinence, hematuria, constipation.     ED course:   VS: Afebrile, HR: 80 BP: 220/100->235/116-> 189/85, Spo2: 98 on NC RR: 20  Labs significant for low stable hemoglobin, BUN/creatinine: 36/2.30, Trop 0.149, Pro BMP 8061.   EKG on admission showed Afib @ 64 bpm, incompleta right BBB, LVH  CT head shows no  evidence of acute intracranial hemorrhage, midline shift or CT evidence of acute territorial infarct.  CT chest A/P demonstrated Multilobar pneumonia. Mild cardiomegaly. No mall bowel obstruction or active bowel inflammation.  Patient was given in the ED 10 mg IVP hydralazine 10 mg Labetalol IVP 1x, 2000 cc bolus NS 1x            (07 Oct 2021 01:24)        SUBJECTIVE:      ALLERGIES:  Allergies    No Known Allergies    Intolerances          MEDICATIONS  (STANDING):  amLODIPine   Tablet 10 milliGRAM(s) Oral daily  apixaban 5 milliGRAM(s) Oral every 12 hours  ARIPiprazole 30 milliGRAM(s) Oral daily  aspirin enteric coated 81 milliGRAM(s) Oral daily  azithromycin   Tablet 500 milliGRAM(s) Oral daily  budesonide 160 MICROgram(s)/formoterol 4.5 MICROgram(s) Inhaler 2 Puff(s) Inhalation two times a day  cloNIDine 0.2 milliGRAM(s) Oral three times a day  cyanocobalamin 1000 MICROGram(s) Oral daily  dextrose 40% Gel 15 Gram(s) Oral once  dextrose 5%. 1000 milliLiter(s) (50 mL/Hr) IV Continuous <Continuous>  dextrose 50% Injectable 25 Gram(s) IV Push once  doxazosin 4 milliGRAM(s) Oral <User Schedule>  famotidine    Tablet 20 milliGRAM(s) Oral every 48 hours  fenofibrate Tablet 145 milliGRAM(s) Oral daily  folic acid 1 milliGRAM(s) Oral daily  glucagon  Injectable 1 milliGRAM(s) IntraMuscular once  hydrALAZINE 100 milliGRAM(s) Oral every 8 hours  insulin lispro (ADMELOG) corrective regimen sliding scale   SubCutaneous three times a day before meals  lamoTRIgine 100 milliGRAM(s) Oral daily  mirtazapine 30 milliGRAM(s) Oral at bedtime  nystatin Powder 1 Application(s) Topical two times a day  oxybutynin 5 milliGRAM(s) Oral four times a day  pantoprazole    Tablet 40 milliGRAM(s) Oral before breakfast  piperacillin/tazobactam IVPB.. 3.375 Gram(s) IV Intermittent every 8 hours  venlafaxine XR. 150 milliGRAM(s) Oral daily    MEDICATIONS  (PRN):  acetaminophen   Tablet .. 650 milliGRAM(s) Oral every 6 hours PRN Temp greater or equal to 38C (100.4F), Mild Pain (1 - 3)  melatonin 3 milliGRAM(s) Oral at bedtime PRN Insomnia  ondansetron Injectable 4 milliGRAM(s) IV Push every 8 hours PRN Nausea and/or Vomiting  sodium chloride 0.65% Nasal 1 Spray(s) Both Nostrils two times a day PRN Nasal Congestion      REVIEW OF SYSTEMS:  CONSTITUTIONAL: No fever,  RESPIRATORY: No cough, wheezing, shortness of breath  CARDIOVASCULAR: No chest pain, dyspnea, palpitations, dizziness, syncope, paroxysmal nocturnal dyspnea, orthopnea, or arm or leg swelling  GASTROINTESTINAL: No abdominal  or epigastric pain, nausea, vomiting,  diarrhea  NEUROLOGICAL: No headaches,  loss of strength, numbness, or tremors    Vital Signs Last 24 Hrs  T(C): 36.8 (08 Oct 2021 18:27), Max: 36.8 (08 Oct 2021 18:27)  T(F): 98.2 (08 Oct 2021 18:27), Max: 98.2 (08 Oct 2021 18:27)  HR: 65 (08 Oct 2021 18:27) (54 - 65)  BP: 162/90 (08 Oct 2021 21:17) (116/70 - 162/90)  BP(mean): --  RR: 19 (08 Oct 2021 18:27) (19 - 20)  SpO2: 94% (08 Oct 2021 18:27) (93% - 98%)    PHYSICAL EXAM:  HEAD:  Atraumatic, Normocephalic  NECK: Supple and normal thyroid.  No JVD or carotid bruit.   HEART: S1, S2 regular , 1/6 soft ejection systolic murmur in mitral area , no thrill and no gallops .  PULMONARY: Bilateral vesicular breathing , few scattered ronchi and few scattered rales are present .  ABDOMEN: Soft nontender and positive bowl sounds   EXTREMITIES:  No clubbing, cyanosis, or pedal  edema  NEUROLOGICAL: AAOX3 , no focal deficit .    LABS:                        8.7    7.35  )-----------( 249      ( 08 Oct 2021 07:58 )             27.3     10-08    141  |  108  |  41<H>  ----------------------------<  127<H>  3.7   |  27  |  3.10<H>    Ca    8.4<L>      08 Oct 2021 07:58  Phos  6.4     10-08  Mg     2.2     10-08    TPro  6.3  /  Alb  2.7<L>  /  TBili  0.3  /  DBili  x   /  AST  13<L>  /  ALT  21  /  AlkPhos  33<L>  10-08    CARDIAC MARKERS ( 07 Oct 2021 14:20 )  .112 ng/mL / x     / x     / x     / x      CARDIAC MARKERS ( 07 Oct 2021 04:23 )  .145 ng/mL / x     / 106 U/L / x     / 2.2 ng/mL          BNP      EKG:  ECHO:  IMAGING:    Assessment/Plan    Will continue to follow during hospital course and give further recommendations as needed . Thanks for your referral . 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 Pt states he would intermittently feel chills and feverish, recorded no temps at group home since last couple of days, he states progressively worsening of dyspnea on exertion and rest, patient unable to walk short distances or climb stairs due to dyspnea, however denies orthopnea, cough, sputum production, night sweats. Patient mentioned some dizziness associated with lower extremities weakness, usually ambulates independently but now feels unable to hold his weight. Regarding palpitations, pt states for 3 days his heart would skip a beat, return to normal. Self limited episodes under a minute, no associated chest pain. Pt also describes urinary frequency beyond baseline, denies dysuria or incontinence, hematuria, constipation.     ED course:   VS: Afebrile, HR: 80 BP: 220/100->235/116-> 189/85, Spo2: 98 on NC RR: 20  Labs significant for low stable hemoglobin, BUN/creatinine: 36/2.30, Trop 0.149, Pro BMP 8061.   EKG on admission showed Afib @ 64 bpm, incompleta right BBB, LVH  CT head shows no  evidence of acute intracranial hemorrhage, midline shift or CT evidence of acute territorial infarct.  CT chest A/P demonstrated Multilobar pneumonia. Mild cardiomegaly. No mall bowel obstruction or active bowel inflammation.  Patient was given in the ED 10 mg IVP hydralazine 10 mg Labetalol IVP 1x, 2000 cc bolus NS 1x            (07 Oct 2021 01:24)        SUBJECTIVE: Patient feeling better .      ALLERGIES:  Allergies    No Known Allergies    Intolerances          MEDICATIONS  (STANDING):  amLODIPine   Tablet 10 milliGRAM(s) Oral daily  apixaban 5 milliGRAM(s) Oral every 12 hours  ARIPiprazole 30 milliGRAM(s) Oral daily  aspirin enteric coated 81 milliGRAM(s) Oral daily  azithromycin   Tablet 500 milliGRAM(s) Oral daily  budesonide 160 MICROgram(s)/formoterol 4.5 MICROgram(s) Inhaler 2 Puff(s) Inhalation two times a day  cloNIDine 0.2 milliGRAM(s) Oral three times a day  cyanocobalamin 1000 MICROGram(s) Oral daily  dextrose 40% Gel 15 Gram(s) Oral once  dextrose 5%. 1000 milliLiter(s) (50 mL/Hr) IV Continuous <Continuous>  dextrose 50% Injectable 25 Gram(s) IV Push once  doxazosin 4 milliGRAM(s) Oral <User Schedule>  famotidine    Tablet 20 milliGRAM(s) Oral every 48 hours  fenofibrate Tablet 145 milliGRAM(s) Oral daily  folic acid 1 milliGRAM(s) Oral daily  glucagon  Injectable 1 milliGRAM(s) IntraMuscular once  hydrALAZINE 100 milliGRAM(s) Oral every 8 hours  insulin lispro (ADMELOG) corrective regimen sliding scale   SubCutaneous three times a day before meals  lamoTRIgine 100 milliGRAM(s) Oral daily  mirtazapine 30 milliGRAM(s) Oral at bedtime  nystatin Powder 1 Application(s) Topical two times a day  oxybutynin 5 milliGRAM(s) Oral four times a day  pantoprazole    Tablet 40 milliGRAM(s) Oral before breakfast  piperacillin/tazobactam IVPB.. 3.375 Gram(s) IV Intermittent every 8 hours  venlafaxine XR. 150 milliGRAM(s) Oral daily    MEDICATIONS  (PRN):  acetaminophen   Tablet .. 650 milliGRAM(s) Oral every 6 hours PRN Temp greater or equal to 38C (100.4F), Mild Pain (1 - 3)  melatonin 3 milliGRAM(s) Oral at bedtime PRN Insomnia  ondansetron Injectable 4 milliGRAM(s) IV Push every 8 hours PRN Nausea and/or Vomiting  sodium chloride 0.65% Nasal 1 Spray(s) Both Nostrils two times a day PRN Nasal Congestion      REVIEW OF SYSTEMS:  CONSTITUTIONAL: No fever,  RESPIRATORY: No cough, wheezing, shortness of breath  CARDIOVASCULAR: No chest pain, dyspnea, palpitations, dizziness, syncope, paroxysmal nocturnal dyspnea, orthopnea, or arm or leg swelling  GASTROINTESTINAL: No abdominal  or epigastric pain, nausea, vomiting,  diarrhea  NEUROLOGICAL: No headaches,  loss of strength, numbness, or tremors    Vital Signs Last 24 Hrs  T(C): 36.8 (08 Oct 2021 18:27), Max: 36.8 (08 Oct 2021 18:27)  T(F): 98.2 (08 Oct 2021 18:27), Max: 98.2 (08 Oct 2021 18:27)  HR: 65 (08 Oct 2021 18:27) (54 - 65)  BP: 162/90 (08 Oct 2021 21:17) (116/70 - 162/90)  BP(mean): --  RR: 19 (08 Oct 2021 18:27) (19 - 20)  SpO2: 94% (08 Oct 2021 18:27) (93% - 98%)    PHYSICAL EXAM:  HEAD:  Atraumatic, Normocephalic  NECK: Supple and normal thyroid.  No JVD or carotid bruit.   HEART: S1, S2 regular , 1/6 soft ejection systolic murmur in mitral area , no thrill and no gallops .  PULMONARY: Bilateral vesicular breathing , few scattered ronchi and few scattered rales are present .  ABDOMEN: Soft nontender and positive bowl sounds   EXTREMITIES:  No clubbing, cyanosis, or pedal  edema  NEUROLOGICAL: AAOX3 , no focal deficit .    LABS:                        8.7    7.35  )-----------( 249      ( 08 Oct 2021 07:58 )             27.3     10-08    141  |  108  |  41<H>  ----------------------------<  127<H>  3.7   |  27  |  3.10<H>    Ca    8.4<L>      08 Oct 2021 07:58  Phos  6.4     10-08  Mg     2.2     10-08    TPro  6.3  /  Alb  2.7<L>  /  TBili  0.3  /  DBili  x   /  AST  13<L>  /  ALT  21  /  AlkPhos  33<L>  10-08    CARDIAC MARKERS ( 07 Oct 2021 14:20 )  .112 ng/mL / x     / x     / x     / x      CARDIAC MARKERS ( 07 Oct 2021 04:23 )  .145 ng/mL / x     / 106 U/L / x     / 2.2 ng/mL          BNP      EKG:  ECHO:  IMAGING:    Assessment/Plan    Will continue to follow during hospital course and give further recommendations as needed . Thanks for your referral . 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 house presenting to [facility] today with multiple concerns including subjective fevers, SOB, weakness, and brief episodes of palpitations, and urinary frequency "for at least 3 days". Pt states he would intermittently feel chills and feverish, recorded no temps at group home since last couple of days, he states progressively worsening of dyspnea on exertion and rest, patient unable to walk short distances or climb stairs due to dyspnea, however denies orthopnea, cough, sputum production, night sweats. Patient mentioned some dizziness associated with lower extremities weakness, usually ambulates independently but now feels unable to hold his weight. Regarding palpitations, pt states for 3 days his heart would skip a beat, return to normal. Self limited episodes under a minute, no associated chest pain. Pt also describes urinary frequency beyond baseline, denies dysuria or incontinence, hematuria, constipation.     ED course:   VS: Afebrile, HR: 80 BP: 220/100->235/116-> 189/85, Spo2: 98 on NC RR: 20  Labs significant for low stable hemoglobin, BUN/creatinine: 36/2.30, Trop 0.149, Pro BMP 8061.   EKG on admission showed Afib @ 64 bpm, incompleta right BBB, LVH  CT head shows no  evidence of acute intracranial hemorrhage, midline shift or CT evidence of acute territorial infarct.  CT chest A/P demonstrated Multilobar pneumonia. Mild cardiomegaly. No mall bowel obstruction or active bowel inflammation.  Patient was given in the ED 10 mg IVP hydralazine 10 mg Labetalol IVP 1x, 2000 cc bolus NS 1x            (07 Oct 2021 01:24)        SUBJECTIVE: Patient feeling better .      ALLERGIES:  Allergies    No Known Allergies    Intolerances          MEDICATIONS  (STANDING):  amLODIPine   Tablet 10 milliGRAM(s) Oral daily  apixaban 5 milliGRAM(s) Oral every 12 hours  ARIPiprazole 30 milliGRAM(s) Oral daily  aspirin enteric coated 81 milliGRAM(s) Oral daily  azithromycin   Tablet 500 milliGRAM(s) Oral daily  budesonide 160 MICROgram(s)/formoterol 4.5 MICROgram(s) Inhaler 2 Puff(s) Inhalation two times a day  cloNIDine 0.2 milliGRAM(s) Oral three times a day  cyanocobalamin 1000 MICROGram(s) Oral daily  dextrose 40% Gel 15 Gram(s) Oral once  dextrose 5%. 1000 milliLiter(s) (50 mL/Hr) IV Continuous <Continuous>  dextrose 50% Injectable 25 Gram(s) IV Push once  doxazosin 4 milliGRAM(s) Oral <User Schedule>  famotidine    Tablet 20 milliGRAM(s) Oral every 48 hours  fenofibrate Tablet 145 milliGRAM(s) Oral daily  folic acid 1 milliGRAM(s) Oral daily  glucagon  Injectable 1 milliGRAM(s) IntraMuscular once  hydrALAZINE 100 milliGRAM(s) Oral every 8 hours  insulin lispro (ADMELOG) corrective regimen sliding scale   SubCutaneous three times a day before meals  lamoTRIgine 100 milliGRAM(s) Oral daily  mirtazapine 30 milliGRAM(s) Oral at bedtime  nystatin Powder 1 Application(s) Topical two times a day  oxybutynin 5 milliGRAM(s) Oral four times a day  pantoprazole    Tablet 40 milliGRAM(s) Oral before breakfast  piperacillin/tazobactam IVPB.. 3.375 Gram(s) IV Intermittent every 8 hours  venlafaxine XR. 150 milliGRAM(s) Oral daily    MEDICATIONS  (PRN):  acetaminophen   Tablet .. 650 milliGRAM(s) Oral every 6 hours PRN Temp greater or equal to 38C (100.4F), Mild Pain (1 - 3)  melatonin 3 milliGRAM(s) Oral at bedtime PRN Insomnia  ondansetron Injectable 4 milliGRAM(s) IV Push every 8 hours PRN Nausea and/or Vomiting  sodium chloride 0.65% Nasal 1 Spray(s) Both Nostrils two times a day PRN Nasal Congestion      REVIEW OF SYSTEMS:  CONSTITUTIONAL: No fever,  RESPIRATORY: Improvement in  cough, wheezing, shortness of breath  CARDIOVASCULAR: Improvement in  chest pain and  dyspnea, No  palpitations, dizziness, syncope, paroxysmal nocturnal dyspnea, and improvement in SOB and  leg swelling  GASTROINTESTINAL: No abdominal  or epigastric pain, nausea, vomiting,  diarrhea  NEUROLOGICAL: No headaches,  loss of strength, numbness, or tremors  Skin : No itching .  Hematology : No bleeding .  Endocrinology : No heat and cold intolerance .  Psychiatry : Patient is mild anxious .  Musculoskeletal : Patient has mild arthritis .    Vital Signs Last 24 Hrs  T(C): 36.8 (08 Oct 2021 18:27), Max: 36.8 (08 Oct 2021 18:27)  T(F): 98.2 (08 Oct 2021 18:27), Max: 98.2 (08 Oct 2021 18:27)  HR: 65 (08 Oct 2021 18:27) (54 - 65)  BP: 162/90 (08 Oct 2021 21:17) (116/70 - 162/90)  BP(mean): --  RR: 19 (08 Oct 2021 18:27) (19 - 20)  SpO2: 94% (08 Oct 2021 18:27) (93% - 98%)    PHYSICAL EXAM:  HEAD:  Atraumatic, Normocephalic  NECK: Supple and normal thyroid.  No JVD or carotid bruit.   HEART: S1, S2 irregular , 1/6 soft ejection systolic murmur in mitral area , no thrill and no gallops .  PULMONARY: Bilateral vesicular breathing , few scattered rhonchi and few scattered rales are present .  ABDOMEN: Soft nontender and positive bowl sounds   EXTREMITIES:  No clubbing, cyanosis, or pedal  edema  NEUROLOGICAL: AAOX3 .  Skin : No rashes .  Musculoskeletal : No joint swellings .    LABS:                        8.7    7.35  )-----------( 249      ( 08 Oct 2021 07:58 )             27.3     10-08    141  |  108  |  41<H>  ----------------------------<  127<H>  3.7   |  27  |  3.10<H>    Ca    8.4<L>      08 Oct 2021 07:58  Phos  6.4     10-08  Mg     2.2     10-08    TPro  6.3  /  Alb  2.7<L>  /  TBili  0.3  /  DBili  x   /  AST  13<L>  /  ALT  21  /  AlkPhos  33<L>  10-08    CARDIAC MARKERS ( 07 Oct 2021 14:20 )  .112 ng/mL / x     / x     / x     / x      CARDIAC MARKERS ( 07 Oct 2021 04:23 )  .145 ng/mL / x     / 106 U/L / x     / 2.2 ng/mL          Assessment/Plan    Will continue to follow during hospital course and give further recommendations as needed . Thanks for your referral . 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 house presenting to \[facility] today with multiple concerns including subjective fevers, SOB, weakness, and brief episodes of palpitations, and urinary frequency "for at least 3 days". Pt states he would intermittently feel chills and feverish, recorded no temps at group home since last couple of days, he states progressively worsening of dyspnea on exertion and rest, patient unable to walk short distances or climb stairs due to dyspnea, however denies orthopnea, cough, sputum production, night sweats. Patient mentioned some dizziness associated with lower extremities weakness, usually ambulates independently but now feels unable to hold his weight. Regarding palpitations, pt states for 3 days his heart would skip a beat, return to normal. Self limited episodes under a minute, no associated chest pain. Pt also describes urinary frequency beyond baseline, denies dysuria or incontinence, hematuria, constipation.     ED course:   VS: Afebrile, HR: 80 BP: 220/100->235/116-> 189/85, Spo2: 98 on NC RR: 20  Labs significant for low stable hemoglobin, BUN/creatinine: 36/2.30, Trop 0.149, Pro BMP 8061.   EKG on admission showed Afib @ 64 bpm, incompleta right BBB, LVH  CT head shows no  evidence of acute intracranial hemorrhage, midline shift or CT evidence of acute territorial infarct.  CT chest A/P demonstrated Multilobar pneumonia. Mild cardiomegaly. No mall bowel obstruction or active bowel inflammation.  Patient was given in the ED 10 mg IVP hydralazine 10 mg Labetalol IVP 1x, 2000 cc bolus NS 1x            (07 Oct 2021 01:24)        SUBJECTIVE: Patient feeling better .      ALLERGIES:  Allergies    No Known Allergies    Intolerances          MEDICATIONS  (STANDING):  amLODIPine   Tablet 10 milliGRAM(s) Oral daily  apixaban 5 milliGRAM(s) Oral every 12 hours  ARIPiprazole 30 milliGRAM(s) Oral daily  aspirin enteric coated 81 milliGRAM(s) Oral daily  azithromycin   Tablet 500 milliGRAM(s) Oral daily  budesonide 160 MICROgram(s)/formoterol 4.5 MICROgram(s) Inhaler 2 Puff(s) Inhalation two times a day  cloNIDine 0.2 milliGRAM(s) Oral three times a day  cyanocobalamin 1000 MICROGram(s) Oral daily  dextrose 40% Gel 15 Gram(s) Oral once  dextrose 5%. 1000 milliLiter(s) (50 mL/Hr) IV Continuous <Continuous>  dextrose 50% Injectable 25 Gram(s) IV Push once  doxazosin 4 milliGRAM(s) Oral <User Schedule>  famotidine    Tablet 20 milliGRAM(s) Oral every 48 hours  fenofibrate Tablet 145 milliGRAM(s) Oral daily  folic acid 1 milliGRAM(s) Oral daily  glucagon  Injectable 1 milliGRAM(s) IntraMuscular once  hydrALAZINE 100 milliGRAM(s) Oral every 8 hours  insulin lispro (ADMELOG) corrective regimen sliding scale   SubCutaneous three times a day before meals  lamoTRIgine 100 milliGRAM(s) Oral daily  mirtazapine 30 milliGRAM(s) Oral at bedtime  nystatin Powder 1 Application(s) Topical two times a day  oxybutynin 5 milliGRAM(s) Oral four times a day  pantoprazole    Tablet 40 milliGRAM(s) Oral before breakfast  piperacillin/tazobactam IVPB.. 3.375 Gram(s) IV Intermittent every 8 hours  venlafaxine XR. 150 milliGRAM(s) Oral daily    MEDICATIONS  (PRN):  acetaminophen   Tablet .. 650 milliGRAM(s) Oral every 6 hours PRN Temp greater or equal to 38C (100.4F), Mild Pain (1 - 3)  melatonin 3 milliGRAM(s) Oral at bedtime PRN Insomnia  ondansetron Injectable 4 milliGRAM(s) IV Push every 8 hours PRN Nausea and/or Vomiting  sodium chloride 0.65% Nasal 1 Spray(s) Both Nostrils two times a day PRN Nasal Congestion      REVIEW OF SYSTEMS:  CONSTITUTIONAL: No fever,  RESPIRATORY: Improvement in  cough, wheezing, shortness of breath  CARDIOVASCULAR: Improvement in  chest pain and  dyspnea, No  palpitations, dizziness, syncope, paroxysmal nocturnal dyspnea, and improvement in SOB and  leg swelling  GASTROINTESTINAL: No abdominal  or epigastric pain, nausea, vomiting,  diarrhea  NEUROLOGICAL: No headaches,  loss of strength, numbness, or tremors  Skin : No itching .  Hematology : No bleeding .  Endocrinology : No heat and cold intolerance .  Psychiatry : Patient is mild anxious .  Musculoskeletal : Patient has mild arthritis .    Vital Signs Last 24 Hrs  T(C): 36.8 (08 Oct 2021 18:27), Max: 36.8 (08 Oct 2021 18:27)  T(F): 98.2 (08 Oct 2021 18:27), Max: 98.2 (08 Oct 2021 18:27)  HR: 65 (08 Oct 2021 18:27) (54 - 65)  BP: 162/90 (08 Oct 2021 21:17) (116/70 - 162/90)  BP(mean): --  RR: 19 (08 Oct 2021 18:27) (19 - 20)  SpO2: 94% (08 Oct 2021 18:27) (93% - 98%)    PHYSICAL EXAM:  HEAD:  Atraumatic, Normocephalic  NECK: Supple and normal thyroid.  No JVD or carotid bruit.   HEART: S1, S2 irregular , 1/6 soft ejection systolic murmur in mitral area , no thrill and no gallops .  PULMONARY: Bilateral vesicular breathing , few scattered rhonchi and few scattered rales are present .  ABDOMEN: Soft nontender and positive bowl sounds   EXTREMITIES:  No clubbing, cyanosis, or pedal  edema  NEUROLOGICAL: AAOX3 .  Skin : No rashes .  Musculoskeletal : No joint swellings .    LABS:                        8.7    7.35  )-----------( 249      ( 08 Oct 2021 07:58 )             27.3     10-08    141  |  108  |  41<H>  ----------------------------<  127<H>  3.7   |  27  |  3.10<H>    Ca    8.4<L>      08 Oct 2021 07:58  Phos  6.4     10-08  Mg     2.2     10-08    TPro  6.3  /  Alb  2.7<L>  /  TBili  0.3  /  DBili  x   /  AST  13<L>  /  ALT  21  /  AlkPhos  33<L>  10-08    CARDIAC MARKERS ( 07 Oct 2021 14:20 )  .112 ng/mL / x     / x     / x     / x      CARDIAC MARKERS ( 07 Oct 2021 04:23 )  .145 ng/mL / x     / 106 U/L / x     / 2.2 ng/mL          Assessment/Plan  Patient has :  1) Atypical chest pain . Pneumonia R/O sepsis .  2) Mild elevated Troponin I secondary to demand ischemia and not ACS or NON-ST KATERINA as primary event .  3) Paroxysmal atrial  fibrillation and stable   4) Chronic mild diastolic heart failure and stable   5) Chronic anemia .  6) Chronic renal insufficiency .  7) Hypertension and BP intermittently mild elevated   Plan : 1) Monitor hemoglobin and electrolytes 2) I/V antibiotics as per PMD . 3) Rest of medications as such . 4) Prognosis guarded .  Will continue to follow during hospital course and give further recommendations as needed . Thanks for your referral . if any questions please contact me at office ([phone])

## 2021-10-08 NOTE — CONSULT NOTE ADULT - ASSESSMENT
[ASSESSMENT and  PLAN]  75yo M admitted with HTN urgency,  /100  multilobar PNA. Fever. Procalcitonin 0.14  CT Chest showed branching nodular opacities and patchy airspace opacities noted in the lingula and bilateral lower lobes which are likely infectious in etiology.  COVID neg.  Blood cx negative.     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  Currently on clonidine TID, amlodipine, hydralazine TID, Doxazosin    Mgmt multifocal pna per infectious diseases, pulmonary.     Mgmt ARIELLA, CKD per renal.     Transfuse PRBC as clinically indicated.   Transfuse PRBC if Hgb <7.0 or if symptomatic.   Follow CBC    Await anemia workup  Start PO B12   Start PO Folic acid  Repeat ferritin, consider IV iron.     DVT Prophylaxis  on Apixiban 5mg q12h for Afib    Thank you for consulting us.     I have discussed the above plan of care with patient/family in detail. They expressed understanding of the treatment plan . Risks, benefits and alternatives discussed in detail. I have asked if they have any questions or concerns and appropriately addressed them; all questions answered to their satisfactions and in lay terms.     Discussed with  xxxxxxx.    > xxxxxx minutes spent in direct patient care, examining and counseling patient,  reviewing  the notes, lab data/ imaging , discussion with multidisciplinary team.

## 2021-10-08 NOTE — PROGRESS NOTE ADULT - PROBLEM SELECTOR PLAN 7
-Patient presenting with worsening dyspnea on rest and exertion   - Appears euvolemic on exam today  - Last TTE on 07/21  Last TTE on 7/21/21 demonstrate normal left ventricular systolic function, estimated LVEF of 60%.LV diastolic dysfunction.  - On admission Pro BNP 8061 elevated compare to previous one on July 4140  - s/p IVF 2 lt bolus in the ED   - On Lasix 20 mg BID, will continue w/ renal function monitor   - Strict I&O's likely demand ischemia in setting of hypertensive urgency   - Troponin trended to peak  - NOT ACS per cardiology Dr. Gamboa

## 2021-10-08 NOTE — CONSULT NOTE ADULT - SUBJECTIVE AND OBJECTIVE BOX
INCOMPLETE NOTE.  Documentation in Progress  PT SEEN AND EVALUATED.   FULL/ADDITIONAL RECOMMENDATIONS TO FOLLOW   ***************************************************************    Patient is a 76y old  Male who presents with a chief complaint of multilobar PNA, afib (08 Oct 2021 13:29)      HPI:  75 yo M with PMHx of Type 2 DM (not on insulin), BPH, CAD s/p stents >4 years ago (not on plavix), diverticulitis (hospitalized 2020 at [facility]), GIB 2/2 diverticulosis (), anxiety, Lupus, HTN, HLD, CKD stage 3, HepC, hx of blood clots in brain (s/p surgery ) living in a group home [facility] presenting to [facility] today with multiple concerns including subjective fevers, SOB, weakness, and brief episodes of palpitations, and urinary frequency "for at least 3 days". Pt states he would intermittently feel chills and feverish, recorded no temps at group home since last couple of days, he states progressively worsening of dyspnea on exertion and rest, patient unable to walk short distances or climb stairs due to dyspnea, however denies orthopnea, cough, sputum production, night sweats. Patient mentioned some dizziness associated with lower extremities weakness, usually ambulates independently but now feels unable to hold his weight. Regarding palpitations, pt states for 3 days his heart would skip a beat, return to normal. Self limited episodes under a minute, no associated chest pain. Pt also describes urinary frequency beyond baseline, denies dysuria or incontinence, hematuria, constipation.     ED course:   VS: Afebrile, HR: 80 BP: 220/100->235/116-> 189/85, Spo2: 98 on NC RR: 20  Labs significant for low stable hemoglobin, BUN/creatinine: 36/2.30, Trop 0.149, Pro BMP 8061.   EKG on admission showed Afib @ 64 bpm, incompleta right BBB, LVH  CT head shows no  evidence of acute intracranial hemorrhage, midline shift or CT evidence of acute territorial infarct.  CT chest A/P demonstrated Multilobar pneumonia. Mild cardiomegaly. No mall bowel obstruction or active bowel inflammation.  Patient was given in the ED 10 mg IVP hydralazine 10 mg Labetalol IVP 1x, 2000 cc bolus NS 1x            (07 Oct 2021 01:24)        PAST MEDICAL & SURGICAL HISTORY:  HTN (hypertension)  c/b multiple episodes of hypertensive urgency    HLD (hyperlipidemia)    Atrial fibrillation  first documented on EKG 10/7/2021    CAD (coronary artery disease)  s/p stents (not on plavix)    Type 2 diabetes mellitus  not on home insulin    Anxiety  multiple psych medications    History of diverticulitis  2021    Diverticulosis  c/b GIB in     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  Had surgery ( 2013 )    S/P tonsillectomy    S/P arthroscopic knee surgery  Bilateral (  )    Torsion of testicle  Had surgery at age 14    Pilonidal cyst  Had surgery (  )    S/P cataract surgery  Bilateral    H/O hernia repair         HEALTH ISSUES - PROBLEM Dx:  PNA (pneumonia)    Elevated troponin    Chronic atrial fibrillation    Stage 3 chronic kidney disease    Chronic diastolic congestive heart failure    CAD (coronary artery disease)    DVT prophylaxis    Anemia of chronic disease    DM (diabetes mellitus), type 2    Depression, major    Abnormal CT scan of lung    Hypertensive urgency    Malignant hypertensive urgency            Heart failure [I50.9]    Hypertension [401.9]    Diabetes mellitus [250.00]    Lupus [710.0]    Hepatitis C [070.70]    Anxiety and depression [F41.9]    CAD (coronary artery disease) [I25.10]    Diverticulosis [K57.90]    Hyperlipidemia [E78.5]    HTN (hypertension) [I10]    HLD (hyperlipidemia) [E78.5]    Atrial fibrillation [I48.91]    CAD (coronary artery disease) [I25.10]    Type 2 diabetes mellitus [E11.9]    Anxiety [F41.9]    History of diverticulitis [Z87.19]    Diverticulosis [K57.90]    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 [434.00]    S/P tonsillectomy [V45.89]    S/P arthroscopic knee surgery [V45.89]    Torsion of testicle [608.20]    Pilonidal cyst [685.1]    S/P cataract surgery [V45.61]    H/O hernia repair [V45.89]    Afib [I48.91]    Acute kidney injury superimposed on CKD [N17.9]    Hypertension [I10]    NSTEMI (non-ST elevation myocardial infarction) [I21.4]    Multifocal pneumonia [J18.9]    Hypertensive urgency [I16.0]        FAMILY HISTORY:  FHx: throat cancer    No family history of colorectal cancer          [SOCIAL HISTORY: ]     smoking:       EtOH:       illicit drugs:       occupation:       marital status:       Other:       [ALLERGIES/INTOLERANCES:]  Allergies    No Known Allergies    Intolerances          [MEDICATIONS]  MEDICATIONS  (STANDING):  amLODIPine   Tablet 10 milliGRAM(s) Oral daily  apixaban 5 milliGRAM(s) Oral every 12 hours  ARIPiprazole 30 milliGRAM(s) Oral daily  aspirin enteric coated 81 milliGRAM(s) Oral daily  azithromycin   Tablet 500 milliGRAM(s) Oral daily  budesonide 160 MICROgram(s)/formoterol 4.5 MICROgram(s) Inhaler 2 Puff(s) Inhalation two times a day  cloNIDine 0.2 milliGRAM(s) Oral three times a day  dextrose 40% Gel 15 Gram(s) Oral once  dextrose 5%. 1000 milliLiter(s) (50 mL/Hr) IV Continuous <Continuous>  dextrose 50% Injectable 25 Gram(s) IV Push once  doxazosin 4 milliGRAM(s) Oral <User Schedule>  famotidine    Tablet 20 milliGRAM(s) Oral every 48 hours  fenofibrate Tablet 145 milliGRAM(s) Oral daily  glucagon  Injectable 1 milliGRAM(s) IntraMuscular once  hydrALAZINE 100 milliGRAM(s) Oral every 8 hours  insulin lispro (ADMELOG) corrective regimen sliding scale   SubCutaneous three times a day before meals  labetalol 100 milliGRAM(s) Oral two times a day  lamoTRIgine 100 milliGRAM(s) Oral daily  mirtazapine 30 milliGRAM(s) Oral at bedtime  nystatin Powder 1 Application(s) Topical two times a day  oxybutynin 5 milliGRAM(s) Oral four times a day  pantoprazole    Tablet 40 milliGRAM(s) Oral before breakfast  piperacillin/tazobactam IVPB.. 3.375 Gram(s) IV Intermittent every 8 hours  venlafaxine XR. 150 milliGRAM(s) Oral daily    MEDICATIONS  (PRN):  acetaminophen   Tablet .. 650 milliGRAM(s) Oral every 6 hours PRN Temp greater or equal to 38C (100.4F), Mild Pain (1 - 3)  melatonin 3 milliGRAM(s) Oral at bedtime PRN Insomnia  ondansetron Injectable 4 milliGRAM(s) IV Push every 8 hours PRN Nausea and/or Vomiting  sodium chloride 0.65% Nasal 1 Spray(s) Both Nostrils two times a day PRN Nasal Congestion        [REVIEW OF SYSTEMS: ]  CONSTITUTIONAL: normal, no fever, no shakes, no chills   EYES: No eye pain, no visual disturbances, no discharge  ENMT:  no discharge  NECK: No pain, no stiffness  BREASTS: No pain, no masses, no nipple discharge  RESPIRATORY: No cough, no wheezing, no chills, no hemoptysis; No shortness of breath  CARDIOVASCULAR: No chest pain, no palpitations, no dizziness, no leg swelling  GASTROINTESTINAL: No abdominal, no epigastric pain. No nausea, no vomiting, no hematemesis; No diarrhea , no constipation. No melena, no hematochezia.  GENITOURINARY: No dysuria, no frequency, no hematuria, no incontinence  NEUROLOGICAL: No headaches, no memory loss, no loss of strength, no numbness, no tremors  SKIN: No itching, no burning, no rashes, no lesions   LYMPH NODES: No enlarged glands  ENDOCRINE: No heat or cold intolerance; No hair loss  MUSCULOSKELETAL: No joint pain or swelling; No muscle, no back, no extremity pain  PSYCHIATRIC: No depression, no anxiety, no mood swings, no difficulty sleeping  HEME/LYMPH: No easy bruising, no bleeding gums      [VITALS SIGNS 24hrs]  Vital Signs Last 24 Hrs  T(C): 36.3 (08 Oct 2021 11:36), Max: 36.4 (08 Oct 2021 04:24)  T(F): 97.3 (08 Oct 2021 11:36), Max: 97.6 (08 Oct 2021 04:24)  HR: 60 (08 Oct 2021 14:10) (54 - 61)  BP: 125/77 (08 Oct 2021 14:10) (116/70 - 160/79)  BP(mean): --  RR: 19 (08 Oct 2021 11:36) (19 - 20)  SpO2: 93% (08 Oct 2021 11:36) (93% - 98%)  Daily     Daily Weight in k.7 (08 Oct 2021 04:24)    I&O's Summary      Last Menstrual Period      [PHYSICAL EXAM]  General: adult in NAD,  WN,  WD.  HEENT: clear oropharynx, anicteric sclera, pink conjunctivae.  Neck: supple, no masses.  CV: normal S1S2, no murmur, no rubs, no gallops.  Lungs: clear to auscultation, no wheezes, no rales, no rhonchi.  Abdomen: soft, non-tender, non-distended, no hepatosplenomegaly, normal BS, no guarding.  Ext: no clubbing, no cyanosis, no edema.  Skin: no rashes,  no petechiae, no venous stasis changes.  Neuro: alert and oriented X3, no focal motor deficits.  LN: no SC FE.      [LABS: ]                        8.7    7.35  )-----------( 249      ( 08 Oct 2021 07:58 )             27.3     CBC Full  -  ( 08 Oct 2021 07:58 )  WBC Count : 7.35 K/uL  RBC Count : 3.10 M/uL  Hemoglobin : 8.7 g/dL  Hematocrit : 27.3 %  Platelet Count - Automated : 249 K/uL  Mean Cell Volume : 88.1 fl  Mean Cell Hemoglobin : 28.1 pg  Mean Cell Hemoglobin Concentration : 31.9 gm/dL  Auto Neutrophil # : 4.99 K/uL  Auto Lymphocyte # : 1.59 K/uL  Auto Monocyte # : 0.47 K/uL  Auto Eosinophil # : 0.25 K/uL  Auto Basophil # : 0.02 K/uL  Auto Neutrophil % : 67.9 %  Auto Lymphocyte % : 21.6 %  Auto Monocyte % : 6.4 %  Auto Eosinophil % : 3.4 %  Auto Basophil % : 0.3 %    10-08    141  |  108  |  41<H>  ----------------------------<  127<H>  3.7   |  27  |  3.10<H>    Ca    8.4<L>      08 Oct 2021 07:58  Phos  6.4     10-08  Mg     2.2     10-08    TPro  6.3  /  Alb  2.7<L>  /  TBili  0.3  /  DBili  x   /  AST  13<L>  /  ALT  21  /  AlkPhos  33<L>  10-08    PT/INR - ( 06 Oct 2021 21:49 )   PT: 13.9 sec;   INR: 1.20 ratio         PTT - ( 06 Oct 2021 21:49 )  PTT:36.3 sec  LIVER FUNCTIONS - ( 08 Oct 2021 07:58 )  Alb: 2.7 g/dL / Pro: 6.3 g/dL / ALK PHOS: 33 U/L / ALT: 21 U/L / AST: 13 U/L / GGT: x           CARDIAC MARKERS ( 07 Oct 2021 14:20 )  .112 ng/mL / x     / x     / x     / x      CARDIAC MARKERS ( 07 Oct 2021 04:23 )  .145 ng/mL / x     / 106 U/L / x     / 2.2 ng/mL  CARDIAC MARKERS ( 06 Oct 2021 21:51 )  .149 ng/mL / x     / x     / x     / x          Urinalysis Basic - ( 06 Oct 2021 21:49 )    Color: Yellow / Appearance: Clear / S.020 / pH: x  Gluc: x / Ketone: Negative  / Bili: Negative / Urobili: Negative   Blood: x / Protein: 500 mg/dL / Nitrite: Negative   Leuk Esterase: Negative / RBC: 3-5 /HPF / WBC 0-2   Sq Epi: x / Non Sq Epi: Occasional / Bacteria: Occasional            CBC TREND (5 Days)  WBC Count: 7.35 K/uL (10-08 @ 07:58)  WBC Count: 8.57 K/uL (10-07 @ 04:23)  WBC Count: 8.49 K/uL (10-06 @ 21:49)    Hemoglobin: 8.7 g/dL (10-08 @ 07:58)  Hemoglobin: 9.8 g/dL (10-07 @ 04:23)  Hemoglobin: 10.1 g/dL (10-06 @ 21:49)    Hematocrit: 27.3 % (10-08 @ 07:58)  Hematocrit: 32.2 % (10-07 @ 04:23)  Hematocrit: 33.2 % (10-06 @ 21:49)    Platelet Count - Automated: 249 K/uL (10-08 @ 07:58)  Platelet Count - Automated: 272 K/uL (10-07 @ 04:23)  Platelet Count - Automated: 302 K/uL (10-06 @ 21:49)      Anemia Studies      Ferritin, Serum: 16 ng/mL ( @ 11:20)  Ferritin, Serum: 11 ng/mL ( @ 09:12)    Iron - Total Binding Capacity.: 350 ug/dL ( @ 08:58)     Vitamin B12, Serum: 345 pg/mL ( @ 09:12)        Folate, Serum: 5.9 ng/mL ( @ 09:12)       ***********************  Myeloma Studies                          [MICROBIOLOGY /  VIROLOGY:]  SARS-CoV-2: NotDetec (06 Oct 2021 21:49)  SARS-CoV-2: NotDetec (20 Aug 2021 09:01)  COVID-19 PCR: NotDetec (2021 03:40)  COVID-19 PCR: NotDetec (2021 17:50)        Culture - Blood (collected 07 Oct 2021 03:57)  Source: .Blood Blood-Peripheral  Preliminary Report (08 Oct 2021 04:00):    No growth to date.    Culture - Blood (collected 07 Oct 2021 03:57)  Source: .Blood Blood-Peripheral  Preliminary Report (08 Oct 2021 04:00):    No growth to date.    Culture - Urine (collected 07 Oct 2021 02:42)  Source: Clean Catch Clean Catch (Midstream)  Final Report (08 Oct 2021 12:49):    <10,000 CFU/mL Normal Urogenital Johnna        [PATHOLOGY]       **********************      [RADIOLOGY & ADDITIONAL STUDIES:]        Patient is a 76y old  Male who presents with a chief complaint of multilobar PNA, afib (08 Oct 2021 13:29)    HPI:  77 yo M with PMHx of Type 2 DM (not on insulin), BPH, CAD s/p stents >4 years ago (not on plavix), diverticulitis (hospitalized 2020 at [facility]), GIB 2/2 diverticulosis (), anxiety, Lupus, HTN, HLD, CKD stage 3, HepC, hx of blood clots in brain (s/p surgery ) [facility] to [facility] today with multiple concerns including subjective fevers, SOB, weakness, and brief episodes of palpitations, and urinary frequency "for at least 3 days". Pt states he would intermittently feel chills and feverish, recorded no temps at group home since last couple of days, he states progressively worsening of dyspnea on exertion and rest, patient unable to walk short distances or climb stairs due to dyspnea, however denies orthopnea, cough, sputum production, night sweats. Patient mentioned some dizziness associated with lower extremities weakness, usually ambulates independently but now feels unable to hold his weight. Regarding palpitations, pt states for 3 days his heart would skip a beat, return to normal. Self limited episodes under a minute, no associated chest pain. Pt also describes urinary frequency beyond baseline, denies dysuria or incontinence, hematuria, constipation.     ED course:   VS: Afebrile, HR: 80 BP: 220/100->235/116-> 189/85, Spo2: 98 on NC RR: 20  Labs significant for low stable hemoglobin, BUN/creatinine: 36/2.30, Trop 0.149, Pro BMP 8061.   EKG on admission showed Afib @ 64 bpm, incompleta right BBB, LVH  CT head shows no  evidence of acute intracranial hemorrhage, midline shift or CT evidence of acute territorial infarct.  CT chest A/P demonstrated Multilobar pneumonia. Mild cardiomegaly. No mall bowel obstruction or active bowel inflammation.  Patient was given in the ED 10 mg IVP hydralazine 10 mg Labetalol IVP 1x, 2000 cc bolus NS 1x   (07 Oct 2021 01:24)    Heme asked to see pt for anemia  Noted Hgb 10.1 g/dL and Cr 2.3 on admission 10/06/21, but prior in 2021, Hgb 9.9 g/dL, Cr 1.6. Prior workup in July showed Fe def, B12 def and Folate def.       PAST MEDICAL & SURGICAL HISTORY:  HTN (hypertension)  c/b multiple episodes of hypertensive urgency    HLD (hyperlipidemia)    Atrial fibrillation  first documented on EKG 10/7/2021    CAD (coronary artery disease)  s/p stents (not on plavix)    Type 2 diabetes mellitus  not on home insulin    Anxiety  multiple psych medications    History of diverticulitis  2021    Diverticulosis  c/b GIB in     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  Had surgery ( 2013 )    S/P tonsillectomy    S/P arthroscopic knee surgery  Bilateral (  )    Torsion of testicle  Had surgery at age 14    Pilonidal cyst  Had surgery (  )    S/P cataract surgery  Bilateral    H/O hernia repair         HEALTH ISSUES - PROBLEM Dx:  PNA (pneumonia)  Elevated troponin  Chronic atrial fibrillation  Stage 3 chronic kidney disease  Chronic diastolic congestive heart failure  CAD (coronary artery disease)  DVT prophylaxis  Anemia of chronic disease  DM (diabetes mellitus), type 2  Depression, major  Abnormal CT scan of lung  Hypertensive urgency  Malignant hypertensive urgency    Heart failure [I50.9]  Hypertension [401.9]  Diabetes mellitus [250.00]  Lupus [710.0]  Hepatitis C [070.70]  Anxiety and depression [F41.9]  CAD (coronary artery disease) [I25.10]  Diverticulosis [K57.90]  Hyperlipidemia [E78.5]  HTN (hypertension) [I10]  HLD (hyperlipidemia) [E78.5]  Atrial fibrillation [I48.91]  CAD (coronary artery disease) [I25.10]  Type 2 diabetes mellitus [E11.9]  Anxiety [F41.9]  History of diverticulitis [Z87.19]  Diverticulosis [K57.90]  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 [434.00]  S/P tonsillectomy [V45.89]  S/P arthroscopic knee surgery [V45.89]  Torsion of testicle [608.20]  Pilonidal cyst [685.1]  S/P cataract surgery [V45.61]  H/O hernia repair [V45.89]  Afib [I48.91]  Acute kidney injury superimposed on CKD [N17.9]  Hypertension [I10]  NSTEMI (non-ST elevation myocardial infarction) [I21.4]  Multifocal pneumonia [J18.9]  Hypertensive urgency [I16.0]      FAMILY HISTORY:  FHx: throat cancer  No family history of colorectal cancer  son  44yo    [SOCIAL HISTORY: ]     smoking:  former smoker, 15 pack year. Quit      EtOH:  denies current.      illicit drugs:  past street drugs. no current use     marital status:  , 5 children. 4 dtrs, 1 son []  Recreational drug use: cocaine several years ago "a few times" has not used in "many years"  Lives with: [facility]; group home; no nursing assistance; observation is there  Ambulates: independent, denies walker or cane but uses guard railings  ADLs: independent, but states need for assistance in bathing, cooking; independent with feeding, ambulating  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[place]  Vaccinations: Moderna x2 doses last dose in May       [ALLERGIES/INTOLERANCES:]  Allergies     No Known Allergies  Intolerances      [MEDICATIONS]  MEDICATIONS  (STANDING):  amLODIPine   Tablet 10 milliGRAM(s) Oral daily  apixaban 5 milliGRAM(s) Oral every 12 hours  ARIPiprazole 30 milliGRAM(s) Oral daily  aspirin enteric coated 81 milliGRAM(s) Oral daily  azithromycin   Tablet 500 milliGRAM(s) Oral daily  budesonide 160 MICROgram(s)/formoterol 4.5 MICROgram(s) Inhaler 2 Puff(s) Inhalation two times a day  cloNIDine 0.2 milliGRAM(s) Oral three times a day  dextrose 40% Gel 15 Gram(s) Oral once  dextrose 5%. 1000 milliLiter(s) (50 mL/Hr) IV Continuous <Continuous>  dextrose 50% Injectable 25 Gram(s) IV Push once  doxazosin 4 milliGRAM(s) Oral <User Schedule>  famotidine    Tablet 20 milliGRAM(s) Oral every 48 hours  fenofibrate Tablet 145 milliGRAM(s) Oral daily  glucagon  Injectable 1 milliGRAM(s) IntraMuscular once  hydrALAZINE 100 milliGRAM(s) Oral every 8 hours  insulin lispro (ADMELOG) corrective regimen sliding scale   SubCutaneous three times a day before meals  labetalol 100 milliGRAM(s) Oral two times a day  lamoTRIgine 100 milliGRAM(s) Oral daily  mirtazapine 30 milliGRAM(s) Oral at bedtime  nystatin Powder 1 Application(s) Topical two times a day  oxybutynin 5 milliGRAM(s) Oral four times a day  pantoprazole    Tablet 40 milliGRAM(s) Oral before breakfast  piperacillin/tazobactam IVPB.. 3.375 Gram(s) IV Intermittent every 8 hours  venlafaxine XR. 150 milliGRAM(s) Oral daily      MEDICATIONS  (PRN):  acetaminophen   Tablet .. 650 milliGRAM(s) Oral every 6 hours PRN Temp greater or equal to 38C (100.4F), Mild Pain (1 - 3)  melatonin 3 milliGRAM(s) Oral at bedtime PRN Insomnia  ondansetron Injectable 4 milliGRAM(s) IV Push every 8 hours PRN Nausea and/or Vomiting  sodium chloride 0.65% Nasal 1 Spray(s) Both Nostrils two times a day PRN Nasal Congestion      [REVIEW OF SYSTEMS: ]  CONSTITUTIONAL: normal, no fever, no shakes, no chills   EYES: No eye pain, no visual disturbances, no discharge  ENMT:  no discharge  NECK: No pain, no stiffness  BREASTS: No pain, no masses, no nipple discharge  RESPIRATORY: No cough, no wheezing, no chills, no hemoptysis; No shortness of breath  CARDIOVASCULAR: No chest pain, no palpitations, no dizziness, no leg swelling  GASTROINTESTINAL: No abdominal, no epigastric pain. No nausea, no vomiting, no hematemesis; No diarrhea , no constipation. No melena, no hematochezia.  GENITOURINARY: No dysuria, no frequency, no hematuria, no incontinence  NEUROLOGICAL: No headaches, no memory loss, no loss of strength, no numbness, no tremors  SKIN: No itching, no burning, no rashes, no lesions   LYMPH NODES: No enlarged glands  ENDOCRINE: No heat or cold intolerance; No hair loss  MUSCULOSKELETAL: No joint pain or swelling; No muscle, no back, no extremity pain  PSYCHIATRIC: No depression, no anxiety, no mood swings, no difficulty sleeping  HEME/LYMPH: No easy bruising, no bleeding gums      [VITALS SIGNS 24hrs]  Vital Signs Last 24 Hrs  T(C): 36.3 (08 Oct 2021 11:36), Max: 36.4 (08 Oct 2021 04:24)  T(F): 97.3 (08 Oct 2021 11:36), Max: 97.6 (08 Oct 2021 04:24)  HR: 60 (08 Oct 2021 14:10) (54 - 61)  BP: 125/77 (08 Oct 2021 14:10) (116/70 - 160/79)  BP(mean): --  RR: 19 (08 Oct 2021 11:36) (19 - 20)  SpO2: 93% (08 Oct 2021 11:36) (93% - 98%)  Daily     Daily Weight in k.7 (08 Oct 2021 04:24)    I&O's Summary      [PHYSICAL EXAM]  General: adult in NAD,  WN,  WD.  HEENT: clear oropharynx, anicteric sclera, pink conjunctivae.  Neck: supple, no masses.  CV: normal S1S2, no murmur, no rubs, no gallops.  Lungs: clear to auscultation, no wheezes, no rales, no rhonchi.  Abdomen: soft, non-tender, non-distended, no hepatosplenomegaly, normal BS, no guarding.  Ext: no clubbing, no cyanosis, no edema.  Skin: no rashes,  no petechiae, no venous stasis changes.  Neuro: alert and oriented X3, no focal motor deficits.  LN: no SC FE.      [LABS: ]                        8.7    7.35  )-----------( 249      ( 08 Oct 2021 07:58 )             27.3     CBC Full  -  ( 08 Oct 2021 07:58 )  WBC Count : 7.35 K/uL  RBC Count : 3.10 M/uL  Hemoglobin : 8.7 g/dL  Hematocrit : 27.3 %  Platelet Count - Automated : 249 K/uL  Mean Cell Volume : 88.1 fl  Mean Cell Hemoglobin : 28.1 pg  Mean Cell Hemoglobin Concentration : 31.9 gm/dL  Auto Neutrophil # : 4.99 K/uL  Auto Lymphocyte # : 1.59 K/uL  Auto Monocyte # : 0.47 K/uL  Auto Eosinophil # : 0.25 K/uL  Auto Basophil # : 0.02 K/uL  Auto Neutrophil % : 67.9 %  Auto Lymphocyte % : 21.6 %  Auto Monocyte % : 6.4 %  Auto Eosinophil % : 3.4 %  Auto Basophil % : 0.3 %    10-08    141  |  108  |  41<H>  ----------------------------<  127<H>  3.7   |  27  |  3.10<H>    Ca    8.4<L>      08 Oct 2021 07:58  Phos  6.4     10-08  Mg     2.2     10-08    TPro  6.3  /  Alb  2.7<L>  /  TBili  0.3  /  DBili  x   /  AST  13<L>  /  ALT  21  /  AlkPhos  33<L>  10-08    PT/INR - ( 06 Oct 2021 21:49 )   PT: 13.9 sec;   INR: 1.20 ratio         PTT - ( 06 Oct 2021 21:49 )  PTT:36.3 sec  LIVER FUNCTIONS - ( 08 Oct 2021 07:58 )  Alb: 2.7 g/dL / Pro: 6.3 g/dL / ALK PHOS: 33 U/L / ALT: 21 U/L / AST: 13 U/L / GGT: x           CARDIAC MARKERS ( 07 Oct 2021 14:20 )  .112 ng/mL / x     / x     / x     / x      CARDIAC MARKERS ( 07 Oct 2021 04:23 )  .145 ng/mL / x     / 106 U/L / x     / 2.2 ng/mL  CARDIAC MARKERS ( 06 Oct 2021 21:51 )  .149 ng/mL / x     / x     / x     / x          Urinalysis Basic - ( 06 Oct 2021 21:49 )    Color: Yellow / Appearance: Clear / S.020 / pH: x  Gluc: x / Ketone: Negative  / Bili: Negative / Urobili: Negative   Blood: x / Protein: 500 mg/dL / Nitrite: Negative   Leuk Esterase: Negative / RBC: 3-5 /HPF / WBC 0-2   Sq Epi: x / Non Sq Epi: Occasional / Bacteria: Occasional      CBC TREND (5 Days)  WBC Count: 7.35 K/uL (10-08 @ 07:58)  WBC Count: 8.57 K/uL (10-07 @ 04:23)  WBC Count: 8.49 K/uL (10-06 @ 21:49)    Hemoglobin: 8.7 g/dL (10-08 @ 07:58)  Hemoglobin: 9.8 g/dL (10-07 @ 04:23)  Hemoglobin: 10.1 g/dL (10-06 @ 21:49)    Hematocrit: 27.3 % (10-08 @ 07:58)  Hematocrit: 32.2 % (10-07 @ 04:23)  Hematocrit: 33.2 % (10-06 @ 21:49)    Platelet Count - Automated: 249 K/uL (10-08 @ 07:58)  Platelet Count - Automated: 272 K/uL (10-07 @ 04:23)  Platelet Count - Automated: 302 K/uL (10-06 @ 21:49)      Anemia Studies  Ferritin, Serum: 16 ng/mL ( @ 11:20)  Ferritin, Serum: 11 ng/mL ( @ 09:12)  Iron - Total Binding Capacity.: 350 ug/dL ( @ 08:58)  Vitamin B12, Serum: 345 pg/mL ( @ 09:12)  Folate, Serum: 5.9 ng/mL ( 09:12)        [MICROBIOLOGY /  VIROLOGY:]  SARS-CoV-2: NotDetec (06 Oct 2021 21:49)  SARS-CoV-2: NotDetec (20 Aug 2021 09:01)  COVID-19 PCR: NotDetec (2021 03:40)  COVID-19 PCR: NotDetec (2021 17:50)    Culture - Blood (collected 07 Oct 2021 03:57)  Source: .Blood Blood-Peripheral  Preliminary Report (08 Oct 2021 04:00):    No growth to date.    Culture - Blood (collected 07 Oct 2021 03:57)  Source: .Blood Blood-Peripheral  Preliminary Report (08 Oct 2021 04:00):    No growth to date.    Culture - Urine (collected 07 Oct 2021 02:42)  Source: Clean Catch Clean Catch (Midstream)  Final Report (08 Oct 2021 12:49):    <10,000 CFU/mL Normal Urogenital Johnna      [RADIOLOGY & ADDITIONAL STUDIES:]

## 2021-10-09 LAB
ALBUMIN SERPL ELPH-MCNC: 2.7 G/DL — LOW (ref 3.3–5)
ALP SERPL-CCNC: 35 U/L — LOW (ref 40–120)
ALT FLD-CCNC: 21 U/L — SIGNIFICANT CHANGE UP (ref 12–78)
ANION GAP SERPL CALC-SCNC: 9 MMOL/L — SIGNIFICANT CHANGE UP (ref 5–17)
AST SERPL-CCNC: 13 U/L — LOW (ref 15–37)
BASOPHILS # BLD AUTO: 0.02 K/UL — SIGNIFICANT CHANGE UP (ref 0–0.2)
BASOPHILS NFR BLD AUTO: 0.3 % — SIGNIFICANT CHANGE UP (ref 0–2)
BILIRUB SERPL-MCNC: 0.4 MG/DL — SIGNIFICANT CHANGE UP (ref 0.2–1.2)
BUN SERPL-MCNC: 51 MG/DL — HIGH (ref 7–23)
CALCIUM SERPL-MCNC: 8.1 MG/DL — LOW (ref 8.5–10.1)
CHLORIDE SERPL-SCNC: 105 MMOL/L — SIGNIFICANT CHANGE UP (ref 96–108)
CO2 SERPL-SCNC: 23 MMOL/L — SIGNIFICANT CHANGE UP (ref 22–31)
CREAT SERPL-MCNC: 3.5 MG/DL — HIGH (ref 0.5–1.3)
CRP SERPL-MCNC: 16 MG/L — HIGH
DSDNA AB SER-ACNC: 15 IU/ML — SIGNIFICANT CHANGE UP
EOSINOPHIL # BLD AUTO: 0.19 K/UL — SIGNIFICANT CHANGE UP (ref 0–0.5)
EOSINOPHIL NFR BLD AUTO: 3.1 % — SIGNIFICANT CHANGE UP (ref 0–6)
FERRITIN SERPL-MCNC: 28 NG/ML — LOW (ref 30–400)
FOLATE SERPL-MCNC: 9.9 NG/ML — SIGNIFICANT CHANGE UP
GLUCOSE SERPL-MCNC: 139 MG/DL — HIGH (ref 70–99)
HCT VFR BLD CALC: 27.3 % — LOW (ref 39–50)
HGB BLD-MCNC: 8.3 G/DL — LOW (ref 13–17)
IMM GRANULOCYTES NFR BLD AUTO: 0.3 % — SIGNIFICANT CHANGE UP (ref 0–1.5)
IRON SATN MFR SERPL: 28 UG/DL — LOW (ref 45–165)
IRON SATN MFR SERPL: 8 % — LOW (ref 16–55)
LYMPHOCYTES # BLD AUTO: 1.02 K/UL — SIGNIFICANT CHANGE UP (ref 1–3.3)
LYMPHOCYTES # BLD AUTO: 16.8 % — SIGNIFICANT CHANGE UP (ref 13–44)
MAGNESIUM SERPL-MCNC: 2.5 MG/DL — SIGNIFICANT CHANGE UP (ref 1.6–2.6)
MCHC RBC-ENTMCNC: 26.1 PG — LOW (ref 27–34)
MCHC RBC-ENTMCNC: 30.4 GM/DL — LOW (ref 32–36)
MCV RBC AUTO: 85.8 FL — SIGNIFICANT CHANGE UP (ref 80–100)
MONOCYTES # BLD AUTO: 0.51 K/UL — SIGNIFICANT CHANGE UP (ref 0–0.9)
MONOCYTES NFR BLD AUTO: 8.4 % — SIGNIFICANT CHANGE UP (ref 2–14)
NEUTROPHILS # BLD AUTO: 4.31 K/UL — SIGNIFICANT CHANGE UP (ref 1.8–7.4)
NEUTROPHILS NFR BLD AUTO: 71.1 % — SIGNIFICANT CHANGE UP (ref 43–77)
NRBC # BLD: 0 /100 WBCS — SIGNIFICANT CHANGE UP (ref 0–0)
PHOSPHATE SERPL-MCNC: 6 MG/DL — HIGH (ref 2.5–4.5)
PLATELET # BLD AUTO: 224 K/UL — SIGNIFICANT CHANGE UP (ref 150–400)
POTASSIUM SERPL-MCNC: 3.7 MMOL/L — SIGNIFICANT CHANGE UP (ref 3.5–5.3)
POTASSIUM SERPL-SCNC: 3.7 MMOL/L — SIGNIFICANT CHANGE UP (ref 3.5–5.3)
PROT SERPL-MCNC: 5.9 G/DL — LOW (ref 6–8.3)
PROT SERPL-MCNC: 5.9 G/DL — LOW (ref 6–8.3)
PROT SERPL-MCNC: 6.4 G/DL — SIGNIFICANT CHANGE UP (ref 6–8.3)
RBC # BLD: 3.18 M/UL — LOW (ref 4.2–5.8)
RBC # FLD: 17 % — HIGH (ref 10.3–14.5)
SODIUM SERPL-SCNC: 137 MMOL/L — SIGNIFICANT CHANGE UP (ref 135–145)
TIBC SERPL-MCNC: 337 UG/DL — SIGNIFICANT CHANGE UP (ref 220–430)
UIBC SERPL-MCNC: 309 UG/DL — SIGNIFICANT CHANGE UP (ref 110–370)
VIT B12 SERPL-MCNC: 413 PG/ML — SIGNIFICANT CHANGE UP (ref 232–1245)
WBC # BLD: 6.07 K/UL — SIGNIFICANT CHANGE UP (ref 3.8–10.5)
WBC # FLD AUTO: 6.07 K/UL — SIGNIFICANT CHANGE UP (ref 3.8–10.5)

## 2021-10-09 PROCEDURE — 99232 SBSQ HOSP IP/OBS MODERATE 35: CPT

## 2021-10-09 RX ORDER — IRON SUCROSE 20 MG/ML
100 INJECTION, SOLUTION INTRAVENOUS EVERY 24 HOURS
Refills: 0 | Status: COMPLETED | OUTPATIENT
Start: 2021-10-09 | End: 2021-10-11

## 2021-10-09 RX ORDER — PIPERACILLIN AND TAZOBACTAM 4; .5 G/20ML; G/20ML
3.38 INJECTION, POWDER, LYOPHILIZED, FOR SOLUTION INTRAVENOUS EVERY 12 HOURS
Refills: 0 | Status: DISCONTINUED | OUTPATIENT
Start: 2021-10-09 | End: 2021-10-12

## 2021-10-09 RX ADMIN — NYSTATIN CREAM 1 APPLICATION(S): 100000 CREAM TOPICAL at 18:29

## 2021-10-09 RX ADMIN — Medication 81 MILLIGRAM(S): at 11:33

## 2021-10-09 RX ADMIN — MIRTAZAPINE 30 MILLIGRAM(S): 45 TABLET, ORALLY DISINTEGRATING ORAL at 22:12

## 2021-10-09 RX ADMIN — ARIPIPRAZOLE 30 MILLIGRAM(S): 15 TABLET ORAL at 11:33

## 2021-10-09 RX ADMIN — Medication 0.2 MILLIGRAM(S): at 18:35

## 2021-10-09 RX ADMIN — Medication 1 MILLIGRAM(S): at 11:34

## 2021-10-09 RX ADMIN — FAMOTIDINE 20 MILLIGRAM(S): 10 INJECTION INTRAVENOUS at 05:30

## 2021-10-09 RX ADMIN — Medication 5 MILLIGRAM(S): at 05:32

## 2021-10-09 RX ADMIN — PANTOPRAZOLE SODIUM 40 MILLIGRAM(S): 20 TABLET, DELAYED RELEASE ORAL at 05:30

## 2021-10-09 RX ADMIN — AZITHROMYCIN 500 MILLIGRAM(S): 500 TABLET, FILM COATED ORAL at 11:33

## 2021-10-09 RX ADMIN — BUDESONIDE AND FORMOTEROL FUMARATE DIHYDRATE 2 PUFF(S): 160; 4.5 AEROSOL RESPIRATORY (INHALATION) at 18:34

## 2021-10-09 RX ADMIN — Medication 4 MILLIGRAM(S): at 09:29

## 2021-10-09 RX ADMIN — APIXABAN 5 MILLIGRAM(S): 2.5 TABLET, FILM COATED ORAL at 05:30

## 2021-10-09 RX ADMIN — Medication 100 MILLIGRAM(S): at 22:12

## 2021-10-09 RX ADMIN — Medication 4 MILLIGRAM(S): at 22:12

## 2021-10-09 RX ADMIN — PIPERACILLIN AND TAZOBACTAM 25 GRAM(S): 4; .5 INJECTION, POWDER, LYOPHILIZED, FOR SOLUTION INTRAVENOUS at 18:34

## 2021-10-09 RX ADMIN — Medication 145 MILLIGRAM(S): at 11:34

## 2021-10-09 RX ADMIN — Medication 5 MILLIGRAM(S): at 23:33

## 2021-10-09 RX ADMIN — Medication 100 MILLIGRAM(S): at 05:30

## 2021-10-09 RX ADMIN — AMLODIPINE BESYLATE 10 MILLIGRAM(S): 2.5 TABLET ORAL at 05:30

## 2021-10-09 RX ADMIN — LAMOTRIGINE 100 MILLIGRAM(S): 25 TABLET, ORALLY DISINTEGRATING ORAL at 11:34

## 2021-10-09 RX ADMIN — BUDESONIDE AND FORMOTEROL FUMARATE DIHYDRATE 2 PUFF(S): 160; 4.5 AEROSOL RESPIRATORY (INHALATION) at 05:31

## 2021-10-09 RX ADMIN — PREGABALIN 1000 MICROGRAM(S): 225 CAPSULE ORAL at 11:34

## 2021-10-09 RX ADMIN — Medication 5 MILLIGRAM(S): at 11:34

## 2021-10-09 RX ADMIN — IRON SUCROSE 100 MILLIGRAM(S): 20 INJECTION, SOLUTION INTRAVENOUS at 18:28

## 2021-10-09 RX ADMIN — Medication 0.2 MILLIGRAM(S): at 05:30

## 2021-10-09 RX ADMIN — APIXABAN 5 MILLIGRAM(S): 2.5 TABLET, FILM COATED ORAL at 18:28

## 2021-10-09 RX ADMIN — NYSTATIN CREAM 1 APPLICATION(S): 100000 CREAM TOPICAL at 05:31

## 2021-10-09 RX ADMIN — Medication 5 MILLIGRAM(S): at 18:28

## 2021-10-09 RX ADMIN — Medication 150 MILLIGRAM(S): at 11:34

## 2021-10-09 RX ADMIN — Medication 100 MILLIGRAM(S): at 13:24

## 2021-10-09 RX ADMIN — PIPERACILLIN AND TAZOBACTAM 25 GRAM(S): 4; .5 INJECTION, POWDER, LYOPHILIZED, FOR SOLUTION INTRAVENOUS at 05:30

## 2021-10-09 NOTE — PROGRESS NOTE ADULT - PROBLEM SELECTOR PLAN 7
-Patient presenting with worsening dyspnea on rest and exertion   - Appears euvolemic on exam today  - Last TTE on 07/21  Last TTE on 7/21/21 demonstrate normal left ventricular systolic function, estimated LVEF of 60%.LV diastolic dysfunction.  - On admission Pro BNP 8061 elevated compare to previous one on July 4140  - s/p IVF 2 lt bolus in the ED   - On Lasix 20 mg BID, will continue w/ renal function monitor   - Strict I&O's

## 2021-10-09 NOTE — PROGRESS NOTE ADULT - ASSESSMENT
[ASSESSMENT and  PLAN]  77yo M admitted with HTN urgency,  /100 and multilobar pneumonia; history of GI bleed with diverticulosis; also noted to have renal insufficiency  - empirically started on oral B12 and folate  - iron studies c/w iron deficiency; will start IV iron therapy  - remains on  Apixiban 5mg q12h for Afib  - awaiting work-up for plasma cell disorder (SPEP/ADIA)  - transfuse to maintain Hg >7  - will follow

## 2021-10-09 NOTE — PROGRESS NOTE ADULT - PROBLEM SELECTOR PLAN 9
- Elevated troponin in setting of hypertensive urgency  - trops trended to peak - ACS RULED OUT  - On ASA continue   - Will d/c fenofibrate in the setting of worsening renal function   - Monitor and replete lytes, keep K>4, Mg>2.

## 2021-10-09 NOTE — PROGRESS NOTE ADULT - SUBJECTIVE AND OBJECTIVE BOX
Subjective: c/o LE weakness.       MEDICATIONS  (STANDING):  amLODIPine   Tablet 10 milliGRAM(s) Oral daily  apixaban 5 milliGRAM(s) Oral every 12 hours  ARIPiprazole 30 milliGRAM(s) Oral daily  aspirin enteric coated 81 milliGRAM(s) Oral daily  azithromycin   Tablet 500 milliGRAM(s) Oral daily  budesonide 160 MICROgram(s)/formoterol 4.5 MICROgram(s) Inhaler 2 Puff(s) Inhalation two times a day  cloNIDine 0.2 milliGRAM(s) Oral three times a day  cyanocobalamin 1000 MICROGram(s) Oral daily  dextrose 40% Gel 15 Gram(s) Oral once  dextrose 5%. 1000 milliLiter(s) (50 mL/Hr) IV Continuous <Continuous>  dextrose 50% Injectable 25 Gram(s) IV Push once  doxazosin 4 milliGRAM(s) Oral <User Schedule>  famotidine    Tablet 20 milliGRAM(s) Oral every 48 hours  fenofibrate Tablet 145 milliGRAM(s) Oral daily  folic acid 1 milliGRAM(s) Oral daily  glucagon  Injectable 1 milliGRAM(s) IntraMuscular once  hydrALAZINE 100 milliGRAM(s) Oral every 8 hours  insulin lispro (ADMELOG) corrective regimen sliding scale   SubCutaneous three times a day before meals  lamoTRIgine 100 milliGRAM(s) Oral daily  mirtazapine 30 milliGRAM(s) Oral at bedtime  nystatin Powder 1 Application(s) Topical two times a day  oxybutynin 5 milliGRAM(s) Oral four times a day  pantoprazole    Tablet 40 milliGRAM(s) Oral before breakfast  piperacillin/tazobactam IVPB.. 3.375 Gram(s) IV Intermittent every 8 hours  venlafaxine XR. 150 milliGRAM(s) Oral daily    MEDICATIONS  (PRN):  acetaminophen   Tablet .. 650 milliGRAM(s) Oral every 6 hours PRN Temp greater or equal to 38C (100.4F), Mild Pain (1 - 3)  melatonin 3 milliGRAM(s) Oral at bedtime PRN Insomnia  ondansetron Injectable 4 milliGRAM(s) IV Push every 8 hours PRN Nausea and/or Vomiting  sodium chloride 0.65% Nasal 1 Spray(s) Both Nostrils two times a day PRN Nasal Congestion          T(C): 36.3 (10-09-21 @ 04:42), Max: 36.8 (10-08-21 @ 18:27)  HR: 50 (10-09-21 @ 04:42) (50 - 65)  BP: 143/78 (10-09-21 @ 04:42) (116/70 - 162/90)  RR: 18 (10-09-21 @ 04:42) (18 - 19)  SpO2: 95% (10-09-21 @ 04:42) (93% - 95%)  Wt(kg): --        I&O's Detail           PHYSICAL EXAM:    GENERAL: NAD  NECK: Supple, no inc in JVP  CHEST/LUNG: Clear  HEART: S1S2  ABDOMEN: Soft, Nontender, Nondistended; Bowel sounds present  EXTREMITIES:  mild edema      LABS:  CBC Full  -  ( 09 Oct 2021 07:10 )  WBC Count : 6.07 K/uL  RBC Count : 3.18 M/uL  Hemoglobin : 8.3 g/dL  Hematocrit : 27.3 %  Platelet Count - Automated : 224 K/uL  Mean Cell Volume : 85.8 fl  Mean Cell Hemoglobin : 26.1 pg  Mean Cell Hemoglobin Concentration : 30.4 gm/dL  Auto Neutrophil # : 4.31 K/uL  Auto Lymphocyte # : 1.02 K/uL  Auto Monocyte # : 0.51 K/uL  Auto Eosinophil # : 0.19 K/uL  Auto Basophil # : 0.02 K/uL  Auto Neutrophil % : 71.1 %  Auto Lymphocyte % : 16.8 %  Auto Monocyte % : 8.4 %  Auto Eosinophil % : 3.1 %  Auto Basophil % : 0.3 %    10-09    137  |  105  |  51<H>  ----------------------------<  139<H>  3.7   |  23  |  3.50<H>    Ca    8.1<L>      09 Oct 2021 07:10  Phos  6.0     10-09  Mg     2.5     10-09    TPro  6.4  /  Alb  2.7<L>  /  TBili  0.4  /  DBili  x   /  AST  13<L>  /  ALT  21  /  AlkPhos  35<L>  10-09            Impression:  * ARIELLA -- DDx: pre-renal, post-renal, hemodynamic ATN due to too rapid BP normalization. Cant r/o LN  * CKD4. Cr 1.6-2 baseline  * Multifocal PNA  * SLE    Recommendations:   * Cont to hold diuretic  * Check bladder scan  * Would dc fibric acid  * Relax anti-hypertensives. Keeps -160  * Check complements, anti-DS DNA  * Change Zosyn to q12h  * BMP in am.

## 2021-10-09 NOTE — PROGRESS NOTE ADULT - PROBLEM SELECTOR PLAN 1
Multilobar PNA  - On admission does not meet sepsis criteria; afebrile, WBC WNL, normal lactate   - RIVP negative on admission   - CT Chest showed branching nodular opacities and patchy airspace opacities noted in the lingula and bilateral lower lobes which are likely infectious in etiology. No pleural effusions are present.   - Given 1g Rocephin and Azithromycin   - Will continue renally dosed Zosyn with azithromycin 500 mg qd per ID Dr. Garner  - F/U BCx NGTD, legionella strep urine antigens  - F/up procalcitonin --> elevated 0.14  CBC in AM Multilobar PNA  - On admission does not meet sepsis criteria; afebrile, WBC WNL, normal lactate   - RIVP negative on admission   - CT Chest showed branching nodular opacities and patchy airspace opacities noted in the lingula and bilateral lower lobes which are likely infectious in etiology. No pleural effusions are present.   - Given 1g Rocephin and Azithromycin   - Will continue renally dosed Zosyn  q 12 hrs-Renal Dose Abx with azithromycin 500 mg qd per ID Dr. Garner  - F/U BCx NGTD, legionella strep urine antigens  - F/up procalcitonin --> elevated 0.14  CBC in AM

## 2021-10-09 NOTE — PROGRESS NOTE ADULT - PROBLEM SELECTOR PLAN 3
Afib   - Patient presents with new onset Afib, however pt's PCP prescribed digoxin, Eliquis by PCP on sept 14/21  - EKG on admission showed Afib @ 64 bpm, incompleta right BBB, LVH   - overnight per remote tele patient demonstrated signs of first degree heart block confirmed by EKG although pt was asymptomatic throughout  - Last TTE on 7/21/21 demonstrate normal left ventricular systolic function, estimated LVEF of 60%.LV diastolic dysfunction.  - On , Eliquis will continue, STOP digoxin- level 0.6  - On labetalol- will hold   - Will continue with Eliquis 5mg BID   - BMP in am  - Continuous Tele   - Cardiology Dr Gamboa called by ER, f/u daily recs- Bradycardia

## 2021-10-09 NOTE — PROGRESS NOTE ADULT - PROBLEM SELECTOR PLAN 2
On admission /100 --> BP elevated but is improving   - Of note patient recently admitted with hypertensive urgency on 07/2021  - s/p 10mg IV hydralazine  - 10mg IV labetalol in the ED  - d/c labetalol 100 mg BID in the setting of worsening renal function.   - On clonidine 0.2 TID now clonidine 0.2 BID in the setting of worsening renal function.   - will continue hydralazine 100 mg q 8 hrs + amlodipine 10 mg qd   - Will continue with home medications with hold parameters   - Continue to monitor BP On admission /100 --> BP elevated but is improving 2/2 NON Compliance likely  - Of note patient recently admitted with hypertensive urgency on 07/2021  - d/c labetalol 100 mg BID in the setting of Bradycardia &  worsening renal function. as per Renal ARIELLA 2/2 tight control of BP   - On clonidine 0.2 TID now clonidine 0.2 BID in the setting of Bradycardia & worsening renal function, as per Renal ARIELLA 2/2 tight control of BP .   - will continue hydralazine 100 mg q 8 hrs + amlodipine 10 mg qd   - Will continue with home medications with hold parameters   - Continue to monitor BP

## 2021-10-09 NOTE — PROGRESS NOTE ADULT - SUBJECTIVE AND OBJECTIVE BOX
Patient seen and examined;  Chart reviewed and events noted;   reports pain in back of his neck that has been ongoing for several months      MEDICATIONS  (STANDING):  amLODIPine   Tablet 10 milliGRAM(s) Oral daily  apixaban 5 milliGRAM(s) Oral every 12 hours  ARIPiprazole 30 milliGRAM(s) Oral daily  aspirin enteric coated 81 milliGRAM(s) Oral daily  azithromycin   Tablet 500 milliGRAM(s) Oral daily  budesonide 160 MICROgram(s)/formoterol 4.5 MICROgram(s) Inhaler 2 Puff(s) Inhalation two times a day  cloNIDine 0.2 milliGRAM(s) Oral three times a day  cyanocobalamin 1000 MICROGram(s) Oral daily  dextrose 40% Gel 15 Gram(s) Oral once  dextrose 5%. 1000 milliLiter(s) (50 mL/Hr) IV Continuous <Continuous>  dextrose 50% Injectable 25 Gram(s) IV Push once  doxazosin 4 milliGRAM(s) Oral <User Schedule>  famotidine    Tablet 20 milliGRAM(s) Oral every 48 hours  fenofibrate Tablet 145 milliGRAM(s) Oral daily  folic acid 1 milliGRAM(s) Oral daily  glucagon  Injectable 1 milliGRAM(s) IntraMuscular once  hydrALAZINE 100 milliGRAM(s) Oral every 8 hours  insulin lispro (ADMELOG) corrective regimen sliding scale   SubCutaneous three times a day before meals  lamoTRIgine 100 milliGRAM(s) Oral daily  mirtazapine 30 milliGRAM(s) Oral at bedtime  nystatin Powder 1 Application(s) Topical two times a day  oxybutynin 5 milliGRAM(s) Oral four times a day  pantoprazole    Tablet 40 milliGRAM(s) Oral before breakfast  piperacillin/tazobactam IVPB.. 3.375 Gram(s) IV Intermittent every 8 hours  venlafaxine XR. 150 milliGRAM(s) Oral daily    MEDICATIONS  (PRN):  acetaminophen   Tablet .. 650 milliGRAM(s) Oral every 6 hours PRN Temp greater or equal to 38C (100.4F), Mild Pain (1 - 3)  melatonin 3 milliGRAM(s) Oral at bedtime PRN Insomnia  ondansetron Injectable 4 milliGRAM(s) IV Push every 8 hours PRN Nausea and/or Vomiting  sodium chloride 0.65% Nasal 1 Spray(s) Both Nostrils two times a day PRN Nasal Congestion      Vital Signs Last 24 Hrs  T(C): 36.3 (09 Oct 2021 04:42), Max: 36.8 (08 Oct 2021 18:27)  T(F): 97.4 (09 Oct 2021 04:42), Max: 98.2 (08 Oct 2021 18:27)  HR: 50 (09 Oct 2021 04:42) (50 - 65)  BP: 143/78 (09 Oct 2021 04:42) (116/70 - 162/90)  RR: 18 (09 Oct 2021 04:42) (18 - 19)  SpO2: 95% (09 Oct 2021 04:42) (93% - 95%)    PHYSICAL EXAM  General: adult in NAD  HEENT: clear oropharynx, anicteric sclera, pink conjunctivae  Neck: supple  CV: normal S1S2 with no murmur rubs or gallops  Lungs: clear to auscultation, no wheezes, no rhales  Abdomen: soft non-tender non-distended, no hepato/splenomegaly  Ext: no clubbing cyanosis or edema  Skin: no rashes and no petichiae  Neuro: alert and oriented X3 no focal deficits      LABS:                        8.3    6.07  )-----------( 224      ( 09 Oct 2021 07:10 )             27.3     Hemoglobin: 8.3 g/dL (10-09 @ 07:10)  Hemoglobin: 8.7 g/dL (10-08 @ 07:58)  Hemoglobin: 9.8 g/dL (10-07 @ 04:23)  Hemoglobin: 10.1 g/dL (10-06 @ 21:49)    10-09    137  |  105  |  51<H>  ----------------------------<  139<H>  3.7   |  23  |  3.50<H>    Ca    8.1<L>      09 Oct 2021 07:10  Phos  6.0     10-09  Mg     2.5     10-09    TPro  6.4  /  Alb  2.7<L>  /  TBili  0.4  /  DBili  x   /  AST  13<L>  /  ALT  21  /  AlkPhos  35<L>  10-09    B12 413  Folate 9.9  Iron 28, TIBC 337, iron sat 8%, ferritin 28  SPEP/ADIA pending

## 2021-10-09 NOTE — PROGRESS NOTE ADULT - SUBJECTIVE AND OBJECTIVE BOX
Date/Time Patient Seen:  		  Referring MD:   Data Reviewed	       Patient is a 76y old  Male who presents with a chief complaint of multilobar PNA, afib (08 Oct 2021 23:51)      Subjective/HPI     PAST MEDICAL & SURGICAL HISTORY:  Hypertension    Diabetes mellitus    Lupus    Hepatitis C    Anxiety and depression    CAD (coronary artery disease)  s/p stents    Diverticulosis    Hyperlipidemia    HTN (hypertension)  c/b multiple episodes of hypertensive urgency    HLD (hyperlipidemia)    Atrial fibrillation  first documented on EKG 10/7/2021    CAD (coronary artery disease)  s/p stents (not on plavix)    Type 2 diabetes mellitus  not on home insulin    Anxiety  multiple psych medications    History of diverticulitis  07/2021    Diverticulosis  c/b GIB in 2020    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  Had surgery ( April 2013 )    S/P tonsillectomy    S/P arthroscopic knee surgery  Bilateral ( 2005 )    Torsion of testicle  Had surgery at age 14    Pilonidal cyst  Had surgery ( 1969 )    S/P cataract surgery  Bilateral    H/O hernia repair          Medication list         MEDICATIONS  (STANDING):  amLODIPine   Tablet 10 milliGRAM(s) Oral daily  apixaban 5 milliGRAM(s) Oral every 12 hours  ARIPiprazole 30 milliGRAM(s) Oral daily  aspirin enteric coated 81 milliGRAM(s) Oral daily  azithromycin   Tablet 500 milliGRAM(s) Oral daily  budesonide 160 MICROgram(s)/formoterol 4.5 MICROgram(s) Inhaler 2 Puff(s) Inhalation two times a day  cloNIDine 0.2 milliGRAM(s) Oral three times a day  cyanocobalamin 1000 MICROGram(s) Oral daily  dextrose 40% Gel 15 Gram(s) Oral once  dextrose 5%. 1000 milliLiter(s) (50 mL/Hr) IV Continuous <Continuous>  dextrose 50% Injectable 25 Gram(s) IV Push once  doxazosin 4 milliGRAM(s) Oral <User Schedule>  famotidine    Tablet 20 milliGRAM(s) Oral every 48 hours  fenofibrate Tablet 145 milliGRAM(s) Oral daily  folic acid 1 milliGRAM(s) Oral daily  glucagon  Injectable 1 milliGRAM(s) IntraMuscular once  hydrALAZINE 100 milliGRAM(s) Oral every 8 hours  insulin lispro (ADMELOG) corrective regimen sliding scale   SubCutaneous three times a day before meals  lamoTRIgine 100 milliGRAM(s) Oral daily  mirtazapine 30 milliGRAM(s) Oral at bedtime  nystatin Powder 1 Application(s) Topical two times a day  oxybutynin 5 milliGRAM(s) Oral four times a day  pantoprazole    Tablet 40 milliGRAM(s) Oral before breakfast  piperacillin/tazobactam IVPB.. 3.375 Gram(s) IV Intermittent every 8 hours  venlafaxine XR. 150 milliGRAM(s) Oral daily    MEDICATIONS  (PRN):  acetaminophen   Tablet .. 650 milliGRAM(s) Oral every 6 hours PRN Temp greater or equal to 38C (100.4F), Mild Pain (1 - 3)  melatonin 3 milliGRAM(s) Oral at bedtime PRN Insomnia  ondansetron Injectable 4 milliGRAM(s) IV Push every 8 hours PRN Nausea and/or Vomiting  sodium chloride 0.65% Nasal 1 Spray(s) Both Nostrils two times a day PRN Nasal Congestion         Vitals log        ICU Vital Signs Last 24 Hrs  T(C): 36.3 (09 Oct 2021 04:42), Max: 36.8 (08 Oct 2021 18:27)  T(F): 97.4 (09 Oct 2021 04:42), Max: 98.2 (08 Oct 2021 18:27)  HR: 50 (09 Oct 2021 04:42) (50 - 65)  BP: 143/78 (09 Oct 2021 04:42) (116/70 - 162/90)  BP(mean): --  ABP: --  ABP(mean): --  RR: 18 (09 Oct 2021 04:42) (18 - 20)  SpO2: 95% (09 Oct 2021 04:42) (93% - 98%)           Input and Output:  I&O's Detail      Lab Data                        8.7    7.35  )-----------( 249      ( 08 Oct 2021 07:58 )             27.3     10-08    141  |  108  |  41<H>  ----------------------------<  127<H>  3.7   |  27  |  3.10<H>    Ca    8.4<L>      08 Oct 2021 07:58  Phos  6.4     10-08  Mg     2.2     10-08    TPro  5.9<L>  /  Alb  x   /  TBili  x   /  DBili  x   /  AST  x   /  ALT  x   /  AlkPhos  x   10-08      CARDIAC MARKERS ( 07 Oct 2021 14:20 )  .112 ng/mL / x     / x     / x     / x            Review of Systems	      Objective     Physical Examination    heart s1s2  lung dec BS  abd soft      Pertinent Lab findings & Imaging      El:  NO   Adequate UO     I&O's Detail           Discussed with:     Cultures:	        Radiology

## 2021-10-09 NOTE — PROGRESS NOTE ADULT - PROBLEM SELECTOR PLAN 5
Acute ARIELLA on CKD   - Creatinine elevated today 3.5 <-- 3.1 <--2.0 <--2.30 <--1.60  - Monitor BMP  - Avoid nephrotoxic medications  - Monitor renal indices  - Dr. Cayden brown following, recs appreciated - check bladder scan, c3 c4 complement and dsdna Acute ARIELLA on CKD 3-4  - Creatinine elevated today 3.5 <-- 3.1 <--2.0 <--2.30 <--1.60  - HOLD Home Lasix   - Avoid nephrotoxic medications  - BMP in AM   - Dr. Cayden brown following, recs appreciated - check bladder scan, c3 c4 complement and dsdna  -As per Renal increase Cr is likely 2/2 tight control of BP- Will STOP BB, Lower dose of Clonidine 2/2 also Bradycardia

## 2021-10-09 NOTE — PROGRESS NOTE ADULT - PROBLEM SELECTOR PLAN 4
likely demand ischemia in setting of hypertensive urgency   - Troponin trended to peak  - NOT ACS per cardiology Dr. Gamboa

## 2021-10-09 NOTE — PROGRESS NOTE ADULT - SUBJECTIVE AND OBJECTIVE BOX
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 Pt states he would intermittently feel chills and feverish, recorded no temps at group home since last couple of days, he states progressively worsening of dyspnea on exertion and rest, patient unable to walk short distances or climb stairs due to dyspnea, however denies orthopnea, cough, sputum production, night sweats. Patient mentioned some dizziness associated with lower extremities weakness, usually ambulates independently but now feels unable to hold his weight. Regarding palpitations, pt states for 3 days his heart would skip a beat, return to normal. Self limited episodes under a minute, no associated chest pain. Pt also describes urinary frequency beyond baseline, denies dysuria or incontinence, hematuria, constipation.     ED course:   VS: Afebrile, HR: 80 BP: 220/100->235/116-> 189/85, Spo2: 98 on NC RR: 20  Labs significant for low stable hemoglobin, BUN/creatinine: 36/2.30, Trop 0.149, Pro BMP 8061.   EKG on admission showed Afib @ 64 bpm, incompleta right BBB, LVH  CT head shows no  evidence of acute intracranial hemorrhage, midline shift or CT evidence of acute territorial infarct.  CT chest A/P demonstrated Multilobar pneumonia. Mild cardiomegaly. No mall bowel obstruction or active bowel inflammation.  Patient was given in the ED 10 mg IVP hydralazine 10 mg Labetalol IVP 1x, 2000 cc bolus NS 1x       INTERVAL HPI:  10/07/21: Pt seen and examined at bedside; in no acute distress; complains of intermittent palpitation and transient chest pain. On Zosyn 3.375gm q8h + azithromycin 500mg daily; concern for medication compliance; ??capacity. will consult psych. K replaced  10/8/21: Pt seen and examined at bedside. Pt is lethargic and is sleeping although answers with yes and no questions. He has no acute complaints. Denies fevers, chills, chest pain, SOB, abdominal pain, n/v/d/c. On Zosyn 3.375gm q8h + azithromycin 500mg daily. Blood pressure improving. PT recommended LETI yesterday.  10/9/21: Pt seen and examined at bedside. Pt with no complaints this am. Worsening Cr function this am; Nephro on board. Will continue to monitor.       OVERNIGHT EVENTS: None     Home Medications:  cloNIDine 0.1 mg oral tablet: 2 tab(s) orally 3 times a day (07 Oct 2021 03:41)  digoxin 125 mcg (0.125 mg) oral tablet: 1 tab(s) orally once a day (07 Oct 2021 03:41)  Eliquis 5 mg oral tablet: 1 tab(s) orally 2 times a day (07 Oct 2021 03:41)  famotidine 40 mg oral tablet: 1 tab(s) orally once a day (at bedtime) (07 Oct 2021 03:41)  fenofibrate 145 mg oral tablet: 1 tab(s) orally once a day (07 Oct 2021 03:41)  labetalol 100 mg oral tablet: 1 tab(s) orally 2 times a day (07 Oct 2021 03:41)  Lasix 20 mg oral tablet: 1 tab(s) orally 2 times a day (07 Oct 2021 03:41)  oxybutynin 5 mg oral tablet: 1 tab(s) orally 4 times a day (07 Oct 2021 03:41)  pantoprazole 40 mg oral delayed release tablet: 1 tab(s) orally once a day (before a meal) (07 Oct 2021 03:41)  Symbicort 160 mcg-4.5 mcg/inh inhalation aerosol: 2 puff(s) inhaled 2 times a day (07 Oct 2021 03:41)      MEDICATIONS  (STANDING):  amLODIPine   Tablet 10 milliGRAM(s) Oral daily  apixaban 5 milliGRAM(s) Oral every 12 hours  ARIPiprazole 30 milliGRAM(s) Oral daily  aspirin enteric coated 81 milliGRAM(s) Oral daily  budesonide 160 MICROgram(s)/formoterol 4.5 MICROgram(s) Inhaler 2 Puff(s) Inhalation two times a day  cloNIDine 0.2 milliGRAM(s) Oral two times a day  cyanocobalamin 1000 MICROGram(s) Oral daily  dextrose 40% Gel 15 Gram(s) Oral once  dextrose 5%. 1000 milliLiter(s) (50 mL/Hr) IV Continuous <Continuous>  dextrose 50% Injectable 25 Gram(s) IV Push once  doxazosin 4 milliGRAM(s) Oral <User Schedule>  famotidine    Tablet 20 milliGRAM(s) Oral every 48 hours  folic acid 1 milliGRAM(s) Oral daily  glucagon  Injectable 1 milliGRAM(s) IntraMuscular once  hydrALAZINE 100 milliGRAM(s) Oral every 8 hours  insulin lispro (ADMELOG) corrective regimen sliding scale   SubCutaneous three times a day before meals  iron sucrose Injectable 100 milliGRAM(s) IV Push every 24 hours  lamoTRIgine 100 milliGRAM(s) Oral daily  mirtazapine 30 milliGRAM(s) Oral at bedtime  nystatin Powder 1 Application(s) Topical two times a day  oxybutynin 5 milliGRAM(s) Oral four times a day  pantoprazole    Tablet 40 milliGRAM(s) Oral before breakfast  piperacillin/tazobactam IVPB.. 3.375 Gram(s) IV Intermittent every 12 hours  venlafaxine XR. 150 milliGRAM(s) Oral daily    MEDICATIONS  (PRN):  acetaminophen   Tablet .. 650 milliGRAM(s) Oral every 6 hours PRN Temp greater or equal to 38C (100.4F), Mild Pain (1 - 3)  melatonin 3 milliGRAM(s) Oral at bedtime PRN Insomnia  ondansetron Injectable 4 milliGRAM(s) IV Push every 8 hours PRN Nausea and/or Vomiting  sodium chloride 0.65% Nasal 1 Spray(s) Both Nostrils two times a day PRN Nasal Congestion      No Known Allergies      Social History:  Tobacco: quit in 1980, not active smoker  EtOH: denies   Recreational drug use: cocaine several years ago "a few times" has not used in "many years"  Lives with: [facility]; group home; no nursing assistance; observation is there  Ambulates: independent, denies walker or cane but uses guard railings  ADLs: independent, but states need for assistance in bathing, cooking; independent with feeding, ambulating  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[place]  Vaccinations: Moderna x2 doses last dose in May (07 Oct 2021 01:24)      REVIEW OF SYSTEMS:  CONSTITUTIONAL: No fever, No chills, No fatigue, No myalgia, No Body ache, No Weakness  EYES: No eye pain,  No visual disturbances, No discharge, No Redness  ENMT: No ear pain, No nose bleed, No vertigo; No sinus pain, No throat pain, No Congestion  NECK: No pain, No stiffness  RESPIRATORY: No cough, No wheezing, No hemoptysis, No shortness of breath  CARDIOVASCULAR: No chest pain, No palpitations  GASTROINTESTINAL: No abdominal pain, No epigastric pain. No nausea, No vomiting, No diarrhea, No constipation; [  ] BM  GENITOURINARY: No dysuria, No frequency, No urgency, No hematuria, No incontinence  NEUROLOGICAL: No headaches, No dizziness, No numbness, No tingling, No tremors, No weakness  EXTREMITIES: No Swelling, No Pain, No Edema  SKIN: [  ] No itching, burning, rashes, or lesions   MUSCULOSKELETAL: No joint pain, No joint swelling; No muscle pain, No back pain, No extremity pain  PSYCHIATRIC: No depression, No anxiety, No mood swings, No difficulty sleeping at night      Vital Signs Last 24 Hrs  T(C): 36.7 (09 Oct 2021 11:25), Max: 36.8 (08 Oct 2021 18:27)  T(F): 98 (09 Oct 2021 11:25), Max: 98.2 (08 Oct 2021 18:27)  HR: 60 (09 Oct 2021 11:25) (50 - 65)  BP: 162/93 (09 Oct 2021 11:25) (120/68 - 162/93)  BP(mean): --  RR: 18 (09 Oct 2021 11:25) (18 - 19)  SpO2: 96% (09 Oct 2021 11:25) (94% - 96%)    CAPILLARY BLOOD GLUCOSE      POCT Blood Glucose.: 129 mg/dL (09 Oct 2021 11:26)  POCT Blood Glucose.: 146 mg/dL (09 Oct 2021 07:36)  POCT Blood Glucose.: 153 mg/dL (08 Oct 2021 21:22)  POCT Blood Glucose.: 139 mg/dL (08 Oct 2021 16:46)      I&O's Summary    PHYSICAL EXAM:  GENERAL:  [ x ] NAD, [ x ] Well appearing, [  ] Agitated, [  ] Drowsy, [  ] Lethargy, [  ] Confused   HEAD:  [ x ] Normal, [  ] Other  EYES:  [ x ] EOMI, [ x ] PERRLA, [ x ] Conjunctiva and sclera clear normal, [  ] Other, [  ] Pallor, [  ] Discharge  ENMT:  [ x ] Normal, [ x ] Moist mucous membranes, [  ] Good dentition, [  ] No thrush  NECK:  [ x ] Supple, [ x ] No JVD, [  ] Normal thyroid, [  ] Lymphadenopathy, [  ] Other  CHEST/LUNG:  [ x ] Clear to auscultation bilaterally, [ x ] Breath Sounds equal B/L , [  ] Poor effort, [  ] No rales, [  ] No rhonchi, [  ] No wheezing  HEART:  [ x ] Regular rate and rhythm, [  ] Tachycardia, [  ] Bradycardia, [  ] Irregular, [  ] No murmurs, No rubs, No gallops, [  ] PPM in place (Mfr:  )  ABDOMEN:  [ x ] Soft, [ x ] Nontender, [ x ] Nondistended, [  ] No mass, [  ] Bowel sounds present, [  ] Obese  NERVOUS SYSTEM:  [ x ] Alert & Oriented x3, [  ] Nonfocal, [  ] Confusion, [  ] Encephalopathic, [  ] Sedated, [  ] Unable to assess, [  ] Dementia, [  ] Other-  EXTREMITIES:  [ x ] 2+ Peripheral Pulses, No clubbing, No cyanosis,  [  ] Edema B/L lower EXT, [  ] PVD stasis skin changes B/L lower EXT, [  ] Wound      DIET: Diet, Consistent Carbohydrate Renal w/Evening Snack:   DASH/TLC Sodium & Cholesterol Restricted (10-07-21 @ 02:46)      LABS:                        8.3    6.07  )-----------( 224      ( 09 Oct 2021 07:10 )             27.3     09 Oct 2021 07:10    137    |  105    |  51     ----------------------------<  139    3.7     |  23     |  3.50     Ca    8.1        09 Oct 2021 07:10  Phos  6.0       09 Oct 2021 07:10  Mg     2.5       09 Oct 2021 07:10    TPro  6.4    /  Alb  2.7    /  TBili  0.4    /  DBili  x      /  AST  13     /  ALT  21     /  AlkPhos  35     09 Oct 2021 07:10        Culture Results:   No growth to date. (10-07 @ 03:57)  Culture Results:   No growth to date. (10-07 @ 03:57)  Culture Results:   <10,000 CFU/mL Normal Urogenital Johnna (10-07 @ 02:42)      CARDIAC MARKERS ( 07 Oct 2021 14:20 )  .112 ng/mL / x     / x     / x     / x      CARDIAC MARKERS ( 07 Oct 2021 04:23 )  .145 ng/mL / x     / 106 U/L / x     / 2.2 ng/mL  CARDIAC MARKERS ( 06 Oct 2021 21:51 )  .149 ng/mL / x     / x     / x     / x            Culture - Blood (collected 07 Oct 2021 03:57)  Source: .Blood Blood-Peripheral  Preliminary Report (08 Oct 2021 04:00):    No growth to date.    Culture - Blood (collected 07 Oct 2021 03:57)  Source: .Blood Blood-Peripheral  Preliminary Report (08 Oct 2021 04:00):    No growth to date.    Culture - Urine (collected 07 Oct 2021 02:42)  Source: Clean Catch Clean Catch (Midstream)  Final Report (08 Oct 2021 12:49):    <10,000 CFU/mL Normal Urogenital Johnna       Anemia Panel:  Iron Total, Serum: 28 ug/dL (10-08-21 @ 22:27)  Iron - Total Binding Capacity.: 337 ug/dL (10-08-21 @ 22:27)  Vitamin B12, Serum: 413 pg/mL (10-08-21 @ 22:26)  Folate, Serum: 9.9 ng/mL (10-08-21 @ 22:26)  Ferritin, Serum: 28 ng/mL (10-08-21 @ 22:26)  Absolute Reticulocytes: 49.4 K/uL (10-08-21 @ 16:57)      Thyroid Panel:  Thyroid Stimulating Hormone, Serum: 1.35 uIU/mL (10-07-21 @ 04:23)            Serum Pro-Brain Natriuretic Peptide: 7438 pg/mL (10-07-21 @ 04:23)  Serum Pro-Brain Natriuretic Peptide: 8061 pg/mL (10-06-21 @ 21:49)      RADIOLOGY & ADDITIONAL TESTS:      HEALTH ISSUES - PROBLEM Dx:  PNA (pneumonia)    Elevated troponin    Chronic atrial fibrillation    Stage 3 chronic kidney disease    Chronic diastolic congestive heart failure    CAD (coronary artery disease)    DVT prophylaxis    Anemia of chronic disease    DM (diabetes mellitus), type 2    Depression, major    Abnormal CT scan of lung    Hypertensive urgency    Malignant hypertensive urgency          Consultant(s) Notes Reviewed:  [ x ] YES     Care Discussed with [ x ] Consultants, [  ] Patient, [  ] Family, [  ] HCP, [  ] , [  ] Social Service, [  ] RN, [  ] Physical Therapy, [  ] Palliative Care Team  DVT PPX: [  ] Lovenox, [  ] SC Heparin, [  ] Coumadin, [  ] Xarelto, [ x ] Eliquis, [  ] Pradaxa, [  ] IV Heparin drip, [  ] SCD, [  ] Ambulation, [  ] Contraindicated 2/2 GI Bleed, [  ] Contraindicated 2/2  Bleed, [  ] Contraindicated 2/2 Brain Bleed  Advanced Directive: [ x ] None, [  ] DNR/DNI Patient is a 76y old  Male who presents with a chief complaint of multilobar PNA, afib (09 Oct 2021 12:25)      History of Present Illness:   75 yo M with PMHx of Type 2 DM (not on insulin), BPH, CAD s/p stents >4 years ago (not on plavix), diverticulitis (hospitalized 07/2020 at [facility]), GIB 2/2 diverticulosis (2020), anxiety, Lupus, HTN, HLD, CKD stage 3, HepC, 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 Pt states he would intermittently feel chills and feverish, recorded no temps at group home since last couple of days, he states progressively worsening of dyspnea on exertion and rest, patient unable to walk short distances or climb stairs due to dyspnea, however denies orthopnea, cough, sputum production, night sweats. Patient mentioned some dizziness associated with lower extremities weakness, usually ambulates independently but now feels unable to hold his weight. Regarding palpitations, pt states for 3 days his heart would skip a beat, return to normal. Self limited episodes under a minute, no associated chest pain. Pt also describes urinary frequency beyond baseline, denies dysuria or incontinence, hematuria, constipation.     ED course:   VS: Afebrile, HR: 80 BP: 220/100->235/116-> 189/85, Spo2: 98 on NC RR: 20  Labs significant for low stable hemoglobin, BUN/creatinine: 36/2.30, Trop 0.149, Pro BMP 8061.   EKG on admission showed Afib @ 64 bpm, incompleta right BBB, LVH  CT head shows no  evidence of acute intracranial hemorrhage, midline shift or CT evidence of acute territorial infarct.  CT chest A/P demonstrated Multilobar pneumonia. Mild cardiomegaly. No mall bowel obstruction or active bowel inflammation.  Patient was given in the ED 10 mg IVP hydralazine 10 mg Labetalol IVP 1x, 2000 cc bolus NS 1x       INTERVAL HPI:  10/07/21: Pt seen and examined at bedside; in no acute distress; complains of intermittent palpitation and transient chest pain. On Zosyn 3.375gm q8h + azithromycin 500mg daily; concern for medication compliance; ??capacity. will consult psych. K replaced  10/8/21: Pt seen and examined at bedside. Pt is lethargic and is sleeping although answers with yes and no questions. He has no acute complaints. Denies fevers, chills, chest pain, SOB, abdominal pain, n/v/d/c. On Zosyn 3.375gm q8h + azithromycin 500mg daily. Blood pressure improving. PT recommended LETI yesterday.  10/9/21: Pt seen and examined at bedside. Pt with no complaints this am. Worsening Cr function this am; Nephro on board. Will continue to monitor.       OVERNIGHT EVENTS: None     Home Medications:  cloNIDine 0.1 mg oral tablet: 2 tab(s) orally 3 times a day (07 Oct 2021 03:41)  digoxin 125 mcg (0.125 mg) oral tablet: 1 tab(s) orally once a day (07 Oct 2021 03:41)  Eliquis 5 mg oral tablet: 1 tab(s) orally 2 times a day (07 Oct 2021 03:41)  famotidine 40 mg oral tablet: 1 tab(s) orally once a day (at bedtime) (07 Oct 2021 03:41)  fenofibrate 145 mg oral tablet: 1 tab(s) orally once a day (07 Oct 2021 03:41)  labetalol 100 mg oral tablet: 1 tab(s) orally 2 times a day (07 Oct 2021 03:41)  Lasix 20 mg oral tablet: 1 tab(s) orally 2 times a day (07 Oct 2021 03:41)  oxybutynin 5 mg oral tablet: 1 tab(s) orally 4 times a day (07 Oct 2021 03:41)  pantoprazole 40 mg oral delayed release tablet: 1 tab(s) orally once a day (before a meal) (07 Oct 2021 03:41)  Symbicort 160 mcg-4.5 mcg/inh inhalation aerosol: 2 puff(s) inhaled 2 times a day (07 Oct 2021 03:41)      MEDICATIONS  (STANDING):  amLODIPine   Tablet 10 milliGRAM(s) Oral daily  apixaban 5 milliGRAM(s) Oral every 12 hours  ARIPiprazole 30 milliGRAM(s) Oral daily  aspirin enteric coated 81 milliGRAM(s) Oral daily  budesonide 160 MICROgram(s)/formoterol 4.5 MICROgram(s) Inhaler 2 Puff(s) Inhalation two times a day  cloNIDine 0.2 milliGRAM(s) Oral two times a day  cyanocobalamin 1000 MICROGram(s) Oral daily  dextrose 40% Gel 15 Gram(s) Oral once  dextrose 5%. 1000 milliLiter(s) (50 mL/Hr) IV Continuous <Continuous>  dextrose 50% Injectable 25 Gram(s) IV Push once  doxazosin 4 milliGRAM(s) Oral <User Schedule>  famotidine    Tablet 20 milliGRAM(s) Oral every 48 hours  folic acid 1 milliGRAM(s) Oral daily  glucagon  Injectable 1 milliGRAM(s) IntraMuscular once  hydrALAZINE 100 milliGRAM(s) Oral every 8 hours  insulin lispro (ADMELOG) corrective regimen sliding scale   SubCutaneous three times a day before meals  iron sucrose Injectable 100 milliGRAM(s) IV Push every 24 hours  lamoTRIgine 100 milliGRAM(s) Oral daily  mirtazapine 30 milliGRAM(s) Oral at bedtime  nystatin Powder 1 Application(s) Topical two times a day  oxybutynin 5 milliGRAM(s) Oral four times a day  pantoprazole    Tablet 40 milliGRAM(s) Oral before breakfast  piperacillin/tazobactam IVPB.. 3.375 Gram(s) IV Intermittent every 12 hours  venlafaxine XR. 150 milliGRAM(s) Oral daily    MEDICATIONS  (PRN):  acetaminophen   Tablet .. 650 milliGRAM(s) Oral every 6 hours PRN Temp greater or equal to 38C (100.4F), Mild Pain (1 - 3)  melatonin 3 milliGRAM(s) Oral at bedtime PRN Insomnia  ondansetron Injectable 4 milliGRAM(s) IV Push every 8 hours PRN Nausea and/or Vomiting  sodium chloride 0.65% Nasal 1 Spray(s) Both Nostrils two times a day PRN Nasal Congestion      No Known Allergies      Social History:  Tobacco: quit in 1980, not active smoker  EtOH: denies   Recreational drug use: cocaine several years ago "a few times" has not used in "many years"  Lives with: [facility]; group home; no nursing assistance; observation is there  Ambulates: independent, denies walker or cane but uses guard railings  ADLs: independent, but states need for assistance in bathing, cooking; independent with feeding, ambulating  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 No nausea, No vomiting, No diarrhea, No constipation; [  ] BM  GENITOURINARY: No dysuria, No frequency, No urgency, No hematuria, No incontinence  NEUROLOGICAL: No headaches, No dizziness, No numbness, No tingling, No tremors, No weakness  EXTREMITIES: No Swelling, No Pain, No Edema  SKIN: [  ] No itching, burning, rashes, or lesions   MUSCULOSKELETAL: No joint pain, No joint swelling; No muscle pain, No back pain, No extremity pain  PSYCHIATRIC: No depression, No anxiety, No mood swings, No difficulty sleeping at night      Vital Signs Last 24 Hrs  T(C): 36.7 (09 Oct 2021 11:25), Max: 36.8 (08 Oct 2021 18:27)  T(F): 98 (09 Oct 2021 11:25), Max: 98.2 (08 Oct 2021 18:27)  HR: 60 (09 Oct 2021 11:25) (50 - 65)  BP: 162/93 (09 Oct 2021 11:25) (120/68 - 162/93)  BP(mean): --  RR: 18 (09 Oct 2021 11:25) (18 - 19)  SpO2: 96% (09 Oct 2021 11:25) (94% - 96%)    CAPILLARY BLOOD GLUCOSE      POCT Blood Glucose.: 129 mg/dL (09 Oct 2021 11:26)  POCT Blood Glucose.: 146 mg/dL (09 Oct 2021 07:36)  POCT Blood Glucose.: 153 mg/dL (08 Oct 2021 21:22)  POCT Blood Glucose.: 139 mg/dL (08 Oct 2021 16:46)      I&O's Summary    PHYSICAL EXAM:  GENERAL:  [ x ] NAD, [ x ] Well appearing, [  ] Agitated, [  ] Drowsy, [  ] Lethargy, [  ] Confused   HEAD:  [ x ] Normal, [  ] Other  EYES:  [ x ] EOMI, [ x ] PERRLA, [ x ] Conjunctiva and sclera clear normal, [  ] Other, [  ] Pallor, [  ] Discharge  ENMT:  [ x ] Normal, [ x ] Moist mucous membranes, [x  ] Good dentition, [  x] No thrush  NECK:  [ x ] Supple, [ x ] No JVD, [ x ] Normal thyroid, [  ] Lymphadenopathy, [  ] Other  CHEST/LUNG:  [ x ] Clear to auscultation bilaterally, [ x ] Breath Sounds equal B/L , [  ] Poor effort, [x  ] No rales, [ x ] few rhonchi, [ x ] No wheezing  HEART:  [ x ] Regular rate and rhythm, [  ] Tachycardia, [  ] Bradycardia, [  ] Irregular, [ x ] No murmurs, No rubs, No gallops, [  ] PPM in place (Mfr:  )  ABDOMEN:  [ x ] Soft, [ x ] Nontender, [ x ] Nondistended, [ x ] No mass, [ x ] Bowel sounds present, [ x ] Obese  NERVOUS SYSTEM:  [ x ] Alert & Oriented x3, [ x] Nonfocal, [  ] Confusion, [  ] Encephalopathic, [  ] Sedated, [  ] Unable to assess, [  ] Dementia, [  ] Other-  EXTREMITIES:  [ x ] 2+ Peripheral Pulses, No clubbing, No cyanosis,  [  ] Edema B/L lower EXT, [  ] PVD stasis skin changes B/L lower EXT, [  ] Wound  SKIN : Tattoos on EXT       DIET: Diet, Consistent Carbohydrate Renal w/Evening Snack:   DASH/TLC Sodium & Cholesterol Restricted (10-07-21 @ 02:46)      LABS:                        8.3    6.07  )-----------( 224      ( 09 Oct 2021 07:10 )             27.3     09 Oct 2021 07:10    137    |  105    |  51     ----------------------------<  139    3.7     |  23     |  3.50     Ca    8.1        09 Oct 2021 07:10  Phos  6.0       09 Oct 2021 07:10  Mg     2.5       09 Oct 2021 07:10    TPro  6.4    /  Alb  2.7    /  TBili  0.4    /  DBili  x      /  AST  13     /  ALT  21     /  AlkPhos  35     09 Oct 2021 07:10        Culture Results:   No growth to date. (10-07 @ 03:57)  Culture Results:   No growth to date. (10-07 @ 03:57)  Culture Results:   <10,000 CFU/mL Normal Urogenital Johnna (10-07 @ 02:42)      CARDIAC MARKERS ( 07 Oct 2021 14:20 )  .112 ng/mL / x     / x     / x     / x      CARDIAC MARKERS ( 07 Oct 2021 04:23 )  .145 ng/mL / x     / 106 U/L / x     / 2.2 ng/mL  CARDIAC MARKERS ( 06 Oct 2021 21:51 )  .149 ng/mL / x     / x     / x     / x            Culture - Blood (collected 07 Oct 2021 03:57)  Source: .Blood Blood-Peripheral  Preliminary Report (08 Oct 2021 04:00):    No growth to date.    Culture - Blood (collected 07 Oct 2021 03:57)  Source: .Blood Blood-Peripheral  Preliminary Report (08 Oct 2021 04:00):    No growth to date.    Culture - Urine (collected 07 Oct 2021 02:42)  Source: Clean Catch Clean Catch (Midstream)  Final Report (08 Oct 2021 12:49):    <10,000 CFU/mL Normal Urogenital Johnna       Anemia Panel:  Iron Total, Serum: 28 ug/dL (10-08-21 @ 22:27)  Iron - Total Binding Capacity.: 337 ug/dL (10-08-21 @ 22:27)  Vitamin B12, Serum: 413 pg/mL (10-08-21 @ 22:26)  Folate, Serum: 9.9 ng/mL (10-08-21 @ 22:26)  Ferritin, Serum: 28 ng/mL (10-08-21 @ 22:26)  Absolute Reticulocytes: 49.4 K/uL (10-08-21 @ 16:57)      Thyroid Panel:  Thyroid Stimulating Hormone, Serum: 1.35 uIU/mL (10-07-21 @ 04:23)    Serum Pro-Brain Natriuretic Peptide: 7438 pg/mL (10-07-21 @ 04:23)  Serum Pro-Brain Natriuretic Peptide: 8061 pg/mL (10-06-21 @ 21:49)      RADIOLOGY & ADDITIONAL TESTS:NONE      HEALTH ISSUES - PROBLEM Dx:  PNA (pneumonia)    Elevated troponin    Chronic atrial fibrillation    Stage 3 chronic kidney disease    Chronic diastolic congestive heart failure    CAD (coronary artery disease)    DVT prophylaxis    Anemia of chronic disease    DM (diabetes mellitus), type 2    Depression, major    Abnormal CT scan of lung    Hypertensive urgency    Malignant hypertensive urgency          Consultant(s) Notes Reviewed:  [ x ] YES     Care Discussed with [ x ] Consultants, [ x ] Patient, [  ] Family, [  ] HCP, [  ] , [  ] Social Service, [ x ] RN, [  ] Physical Therapy, [  ] Palliative Care Team  DVT PPX: [  ] Lovenox, [  ] SC Heparin, [  ] Coumadin, [  ] Xarelto, [ x ] Eliquis, [  ] Pradaxa, [  ] IV Heparin drip, [  ] SCD, [  ] Ambulation, [  ] Contraindicated 2/2 GI Bleed, [  ] Contraindicated 2/2  Bleed, [  ] Contraindicated 2/2 Brain Bleed  Advanced Directive: [ x ] None, [  ] DNR/DNI Patient is a 76y old  Male who presents with a chief complaint of multilobar PNA, afib (09 Oct 2021 12:25)      History of Present Illness:   77 yo M with PMHx of Type 2 DM (not on insulin), BPH, CAD s/p stents >4 years ago (not on plavix), diverticulitis (hospitalized 07/2020 at [facility]), GIB 2/2 diverticulosis (2020), anxiety, Lupus, HTN, HLD, CKD stage 3, HepC, hx of blood clots in brain (s/p surgery 2013) living in a group home [facility] presenting to [facility] today with multiple concerns including subjective fevers, SOB, weakness, and brief episodes of palpitations, and urinary frequency "for at least 3 days". Pt states he would intermittently feel chills and feverish, recorded no temps at group home since last couple of days, he states progressively worsening of dyspnea on exertion and rest, patient unable to walk short distances or climb stairs due to dyspnea, however denies orthopnea, cough, sputum production, night sweats. Patient mentioned some dizziness associated with lower extremities weakness, usually ambulates independently but now feels unable to hold his weight. Regarding palpitations, pt states for 3 days his heart would skip a beat, return to normal. Self limited episodes under a minute, no associated chest pain. Pt also describes urinary frequency beyond baseline, denies dysuria or incontinence, hematuria, constipation.     ED course:   VS: Afebrile, HR: 80 BP: 220/100->235/116-> 189/85, Spo2: 98 on NC RR: 20  Labs significant for low stable hemoglobin, BUN/creatinine: 36/2.30, Trop 0.149, Pro BMP 8061.   EKG on admission showed Afib @ 64 bpm, incompleta right BBB, LVH  CT head shows no  evidence of acute intracranial hemorrhage, midline shift or CT evidence of acute territorial infarct.  CT chest A/P demonstrated Multilobar pneumonia. Mild cardiomegaly. No mall bowel obstruction or active bowel inflammation.  Patient was given in the ED 10 mg IVP hydralazine 10 mg Labetalol IVP 1x, 2000 cc bolus NS 1x       INTERVAL HPI:  10/07/21: Pt seen and examined at bedside; in no acute distress; complains of intermittent palpitation and transient chest pain. On Zosyn 3.375gm q8h + azithromycin 500mg daily; concern for medication compliance; ??capacity. will consult psych. K replaced  10/8/21: Pt seen and examined at bedside. Pt is lethargic and is sleeping although answers with yes and no questions. He has no acute complaints. Denies fevers, chills, chest pain, SOB, abdominal pain, n/v/d/c. On Zosyn 3.375gm q8h + azithromycin 500mg daily. Blood pressure improving. PT recommended LETI yesterday.  10/9/21: Pt seen and examined at bedside. Pt with no complaints this am. Worsening Cr function this am; Nephro on board. Will continue to monitor.       OVERNIGHT EVENTS: None     Home Medications:  cloNIDine 0.1 mg oral tablet: 2 tab(s) orally 3 times a day (07 Oct 2021 03:41)  digoxin 125 mcg (0.125 mg) oral tablet: 1 tab(s) orally once a day (07 Oct 2021 03:41)  Eliquis 5 mg oral tablet: 1 tab(s) orally 2 times a day (07 Oct 2021 03:41)  famotidine 40 mg oral tablet: 1 tab(s) orally once a day (at bedtime) (07 Oct 2021 03:41)  fenofibrate 145 mg oral tablet: 1 tab(s) orally once a day (07 Oct 2021 03:41)  labetalol 100 mg oral tablet: 1 tab(s) orally 2 times a day (07 Oct 2021 03:41)  Lasix 20 mg oral tablet: 1 tab(s) orally 2 times a day (07 Oct 2021 03:41)  oxybutynin 5 mg oral tablet: 1 tab(s) orally 4 times a day (07 Oct 2021 03:41)  pantoprazole 40 mg oral delayed release tablet: 1 tab(s) orally once a day (before a meal) (07 Oct 2021 03:41)  Symbicort 160 mcg-4.5 mcg/inh inhalation aerosol: 2 puff(s) inhaled 2 times a day (07 Oct 2021 03:41)      MEDICATIONS  (STANDING):  amLODIPine   Tablet 10 milliGRAM(s) Oral daily  apixaban 5 milliGRAM(s) Oral every 12 hours  ARIPiprazole 30 milliGRAM(s) Oral daily  aspirin enteric coated 81 milliGRAM(s) Oral daily  budesonide 160 MICROgram(s)/formoterol 4.5 MICROgram(s) Inhaler 2 Puff(s) Inhalation two times a day  cloNIDine 0.2 milliGRAM(s) Oral two times a day  cyanocobalamin 1000 MICROGram(s) Oral daily  dextrose 40% Gel 15 Gram(s) Oral once  dextrose 5%. 1000 milliLiter(s) (50 mL/Hr) IV Continuous <Continuous>  dextrose 50% Injectable 25 Gram(s) IV Push once  doxazosin 4 milliGRAM(s) Oral <User Schedule>  famotidine    Tablet 20 milliGRAM(s) Oral every 48 hours  folic acid 1 milliGRAM(s) Oral daily  glucagon  Injectable 1 milliGRAM(s) IntraMuscular once  hydrALAZINE 100 milliGRAM(s) Oral every 8 hours  insulin lispro (ADMELOG) corrective regimen sliding scale   SubCutaneous three times a day before meals  iron sucrose Injectable 100 milliGRAM(s) IV Push every 24 hours  lamoTRIgine 100 milliGRAM(s) Oral daily  mirtazapine 30 milliGRAM(s) Oral at bedtime  nystatin Powder 1 Application(s) Topical two times a day  oxybutynin 5 milliGRAM(s) Oral four times a day  pantoprazole    Tablet 40 milliGRAM(s) Oral before breakfast  piperacillin/tazobactam IVPB.. 3.375 Gram(s) IV Intermittent every 12 hours  venlafaxine XR. 150 milliGRAM(s) Oral daily    MEDICATIONS  (PRN):  acetaminophen   Tablet .. 650 milliGRAM(s) Oral every 6 hours PRN Temp greater or equal to 38C (100.4F), Mild Pain (1 - 3)  melatonin 3 milliGRAM(s) Oral at bedtime PRN Insomnia  ondansetron Injectable 4 milliGRAM(s) IV Push every 8 hours PRN Nausea and/or Vomiting  sodium chloride 0.65% Nasal 1 Spray(s) Both Nostrils two times a day PRN Nasal Congestion      No Known Allergies      Social History:  Tobacco: quit in 1980, not active smoker  EtOH: denies   Recreational drug use: cocaine several years ago "a few times" has not used in "many years"  Lives with: [facility]; group home; no nursing assistance; observation is there  Ambulates: independent, denies walker or cane but uses guard railings  ADLs: independent, but states need for assistance in bathing, cooking; independent with feeding, ambulating  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[place]  Vaccinations: Moderna x2 doses last dose in May (07 Oct 2021 01:24)      REVIEW OF SYSTEMS: i am OK   CONSTITUTIONAL: No fever, No chills, No fatigue, No myalgia, No Body ache, No Weakness  EYES: No eye pain,  No visual disturbances, No discharge, No Redness  ENMT: No ear pain, No nose bleed, No vertigo; No sinus pain, No throat pain, No Congestion  NECK: No pain, No stiffness  RESPIRATORY: No cough, No wheezing, No hemoptysis, No shortness of breath  CARDIOVASCULAR: No chest pain, No palpitations  GASTROINTESTINAL: No abdominal pain, No epigastric pain. No nausea, No vomiting, No diarrhea, No constipation; [  ] BM  GENITOURINARY: No dysuria, No frequency, No urgency, No hematuria, No incontinence  NEUROLOGICAL: No headaches, No dizziness, No numbness, No tingling, No tremors, No weakness  EXTREMITIES: No Swelling, No Pain, No Edema  SKIN: [ x ] No itching, burning, rashes, or lesions   MUSCULOSKELETAL: No joint pain, No joint swelling; No muscle pain, No back pain, No extremity pain  PSYCHIATRIC: No depression, No anxiety, No mood swings, No difficulty sleeping at night      Vital Signs Last 24 Hrs  T(C): 36.7 (09 Oct 2021 11:25), Max: 36.8 (08 Oct 2021 18:27)  T(F): 98 (09 Oct 2021 11:25), Max: 98.2 (08 Oct 2021 18:27)  HR: 60 (09 Oct 2021 11:25) (50 - 65)  BP: 162/93 (09 Oct 2021 11:25) (120/68 - 162/93)  BP(mean): --  RR: 18 (09 Oct 2021 11:25) (18 - 19)  SpO2: 96% (09 Oct 2021 11:25) (94% - 96%)    CAPILLARY BLOOD GLUCOSE      POCT Blood Glucose.: 129 mg/dL (09 Oct 2021 11:26)  POCT Blood Glucose.: 146 mg/dL (09 Oct 2021 07:36)  POCT Blood Glucose.: 153 mg/dL (08 Oct 2021 21:22)  POCT Blood Glucose.: 139 mg/dL (08 Oct 2021 16:46)      I&O's Summary    PHYSICAL EXAM:  GENERAL:  [ x ] NAD, [ x ] Well appearing, [  ] Agitated, [  ] Drowsy, [  ] Lethargy, [  ] Confused   HEAD:  [ x ] Normal, [  ] Other  EYES:  [ x ] EOMI, [ x ] PERRLA, [ x ] Conjunctiva and sclera clear normal, [  ] Other, [  ] Pallor, [  ] Discharge  ENMT:  [ x ] Normal, [ x ] Moist mucous membranes, [x  ] Good dentition, [  x] No thrush  NECK:  [ x ] Supple, [ x ] No JVD, [ x ] Normal thyroid, [  ] Lymphadenopathy, [  ] Other  CHEST/LUNG:  [ x ] Clear to auscultation bilaterally, [ x ] Breath Sounds equal B/L , [  ] Poor effort, [x  ] No rales, [ x ] few rhonchi, [ x ] No wheezing  HEART:  [ x ] Regular rate and rhythm, [  ] Tachycardia, [  ] Bradycardia, [  ] Irregular, [ x ] No murmurs, No rubs, No gallops, [  ] PPM in place (Mfr:  )  ABDOMEN:  [ x ] Soft, [ x ] Nontender, [ x ] Nondistended, [ x ] No mass, [ x ] Bowel sounds present, [ x ] Obese  NERVOUS SYSTEM:  [ x ] Alert & Oriented x3, [ x] Nonfocal, [  ] Confusion, [  ] Encephalopathic, [  ] Sedated, [  ] Unable to assess, [  ] Dementia, [  ] Other-  EXTREMITIES:  [ x ] 2+ Peripheral Pulses, No clubbing, No cyanosis,  [  ] Edema B/L lower EXT, [  ] PVD stasis skin changes B/L lower EXT, [  ] Wound  LYMPH:  No lymphadenopathy noted  SKIN:  [ x ] No rashes or lesions, [  ] Pressure ulcers, [  ] Ecchymosis, [  ] Skin tears, [ x ] Other- Tattoos on EXT             DIET: Diet, Consistent Carbohydrate Renal w/Evening Snack:   DASH/TLC Sodium & Cholesterol Restricted (10-07-21 @ 02:46)      LABS:                        8.3    6.07  )-----------( 224      ( 09 Oct 2021 07:10 )             27.3     09 Oct 2021 07:10    137    |  105    |  51     ----------------------------<  139    3.7     |  23     |  3.50     Ca    8.1        09 Oct 2021 07:10  Phos  6.0       09 Oct 2021 07:10  Mg     2.5       09 Oct 2021 07:10    TPro  6.4    /  Alb  2.7    /  TBili  0.4    /  DBili  x      /  AST  13     /  ALT  21     /  AlkPhos  35     09 Oct 2021 07:10        Culture Results:   No growth to date. (10-07 @ 03:57)  Culture Results:   No growth to date. (10-07 @ 03:57)  Culture Results:   <10,000 CFU/mL Normal Urogenital Johnna (10-07 @ 02:42)      CARDIAC MARKERS ( 07 Oct 2021 14:20 )  .112 ng/mL / x     / x     / x     / x      CARDIAC MARKERS ( 07 Oct 2021 04:23 )  .145 ng/mL / x     / 106 U/L / x     / 2.2 ng/mL  CARDIAC MARKERS ( 06 Oct 2021 21:51 )  .149 ng/mL / x     / x     / x     / x            Culture - Blood (collected 07 Oct 2021 03:57)  Source: .Blood Blood-Peripheral  Preliminary Report (08 Oct 2021 04:00):    No growth to date.    Culture - Blood (collected 07 Oct 2021 03:57)  Source: .Blood Blood-Peripheral  Preliminary Report (08 Oct 2021 04:00):    No growth to date.    Culture - Urine (collected 07 Oct 2021 02:42)  Source: Clean Catch Clean Catch (Midstream)  Final Report (08 Oct 2021 12:49):    <10,000 CFU/mL Normal Urogenital Johnna       Anemia Panel:  Iron Total, Serum: 28 ug/dL (10-08-21 @ 22:27)  Iron - Total Binding Capacity.: 337 ug/dL (10-08-21 @ 22:27)  Vitamin B12, Serum: 413 pg/mL (10-08-21 @ 22:26)  Folate, Serum: 9.9 ng/mL (10-08-21 @ 22:26)  Ferritin, Serum: 28 ng/mL (10-08-21 @ 22:26)  Absolute Reticulocytes: 49.4 K/uL (10-08-21 @ 16:57)      Thyroid Panel:  Thyroid Stimulating Hormone, Serum: 1.35 uIU/mL (10-07-21 @ 04:23)    Serum Pro-Brain Natriuretic Peptide: 7438 pg/mL (10-07-21 @ 04:23)  Serum Pro-Brain Natriuretic Peptide: 8061 pg/mL (10-06-21 @ 21:49)      RADIOLOGY & ADDITIONAL TESTS:NONE      HEALTH ISSUES - PROBLEM Dx:  PNA (pneumonia)    Elevated troponin    Chronic atrial fibrillation    Stage 3 chronic kidney disease    Chronic diastolic congestive heart failure    CAD (coronary artery disease)    DVT prophylaxis    Anemia of chronic disease    DM (diabetes mellitus), type 2    Depression, major    Abnormal CT scan of lung    Hypertensive urgency    Malignant hypertensive urgency          Consultant(s) Notes Reviewed:  [ x ] YES     Care Discussed with [ x ] Consultants, [ x ] Patient, [  ] Family, [  ] HCP, [  ] , [  ] Social Service, [ x ] RN, [  ] Physical Therapy, [  ] Palliative Care Team  DVT PPX: [  ] Lovenox, [  ] SC Heparin, [  ] Coumadin, [  ] Xarelto, [ x ] Eliquis, [  ] Pradaxa, [  ] IV Heparin drip, [  ] SCD, [  ] Ambulation, [  ] Contraindicated 2/2 GI Bleed, [  ] Contraindicated 2/2  Bleed, [  ] Contraindicated 2/2 Brain Bleed  Advanced Directive: [ x ] None, [  ] DNR/DNI

## 2021-10-09 NOTE — PROGRESS NOTE ADULT - ATTENDING COMMENTS
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 Patient admitted for hypertensive urgency, Afib and PNA.   pt seen, examined case & care plan d/w pt, residents at detail.  AM labs   PO diet   -ID-Dr Garner- continue Abx   Cardio DR Gamboa-follow up  -PT---> LETI  -Total care time 45 minutes.

## 2021-10-09 NOTE — PROGRESS NOTE ADULT - ASSESSMENT
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 Patient admitted for hypertensive urgency, Afib and PNA.

## 2021-10-09 NOTE — PROGRESS NOTE ADULT - PROBLEM SELECTOR PLAN 6
H/H 10.1/32 on admission, baseline hemoglobin 8.0-9.0  - Currently hemodynamically stable, likely related to CKD   - Iron studies on 07/21 demonstrated iron deficiency anemia   - F/u AM CBC H/H 10.1/32 on admission, baseline hemoglobin 8.0-9.0  - Currently hemodynamically stable, likely related to CKD   - Iron studies on 07/21 demonstrated iron deficiency anemia   - F/u AM CBC, Follow w /up R/O -Plasma cell disorder  -D/W Dr Rob

## 2021-10-09 NOTE — PROGRESS NOTE ADULT - ASSESSMENT
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 Patient admitted for hypertensive urgency, Afib and PNA.     HEME Onc eval noted - H and H reviewed - Labs reviewed -     pna eval   ID eval noted  biomarkers - cx -   on emp ABX  TTE and CT chest reviewed  LABS reviewed  pt with CKD - Anemia -   monitor VS and Sat - wean o2 as tolerated - keep sat > 88 pct  [facility] rx regimen and BP control - pt with known HTN - AF - CAD -   DM care - serial FS -

## 2021-10-09 NOTE — PROGRESS NOTE ADULT - PROBLEM SELECTOR PLAN 12
Need for prophylaxis measures   DVT ppx: improve score 1. Continue home Eliquis 5mg BID   IMPROVE VTE Individual Risk Assessment  RISK                                                                Points  [  ] Previous VTE                                                  3  [  ] Thrombophilia                                               2  [  ] Lower limb paralysis                                      2        (unable to hold up >15 seconds)    [  ] Current Cancer                                              2         (within 6 months)  [  ] Immobilization > 24 hrs                                1  [  ] ICU/CCU stay > 24 hours                              1  [x  ] Age > 60                                                      1  IMPROVE VTE Score _______1__ Need for prophylaxis measures   DVT ppx: improve score 1. Continue home Eliquis 5mg BID   IMPROVE VTE Individual Risk Assessment  RISK                                                                Points  [  ] Previous VTE                                                  3  [  ] Thrombophilia                                               2  [  ] Lower limb paralysis                                      2        (unable to hold up >15 seconds)    [  ] Current Cancer                                              2         (within 6 months)  [  ] Immobilization > 24 hrs                                1  [  ] ICU/CCU stay > 24 hours                              1  [x  ] Age > 60                                                      1  IMPROVE VTE Score _______1__.

## 2021-10-09 NOTE — PROGRESS NOTE ADULT - SUBJECTIVE AND OBJECTIVE BOX
[facility]  INFECTIOUS DISEASES   [address] [place] [address]  Tel: [phone]     Fax: [phone]  =======================================================    [id number]  TRIPP ZARATE     Follow up: multilobar pneumonia    Patient seen and examined.   Clinically has improved significantly. No fever, no SOB.  Has O2 with NC. No other complaint or overnight event except for asymptomatic bradycardia.     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 3.375 Gram(s) IV Intermittent every 8 hours     REVIEW OF SYSTEMS:  CONSTITUTIONAL:  No Fever or chills  HEENT:  No diplopia or blurred vision.  No sore throat or runny nose.  CARDIOVASCULAR:  No chest pain or SOB.  RESPIRATORY:  No cough, shortness of breath, PND or orthopnea.  GASTROINTESTINAL:  No nausea, vomiting or diarrhea.  GENITOURINARY:  No dysuria, frequency or urgency. No Blood in urine  MUSCULOSKELETAL:  no joint aches, no muscle pain  SKIN:  No change in skin, hair or nails.  NEUROLOGIC:  No paresthesias, fasciculations, seizures or weakness.  PSYCHIATRIC:  No disorder of thought or mood.  ENDOCRINE:  No heat or cold intolerance, polyuria or polydipsia.  HEMATOLOGICAL:  No easy bruising or bleeding.     Physical Exam:  Vital Signs Last 24 Hrs  T(C): 36.7 (09 Oct 2021 11:25), Max: 36.8 (08 Oct 2021 18:27)  T(F): 98 (09 Oct 2021 11:25), Max: 98.2 (08 Oct 2021 18:27)  HR: 60 (09 Oct 2021 11:25) (50 - 65)  BP: 162/93 (09 Oct 2021 11:25) (120/68 - 162/93)  BP(mean): --  RR: 18 (09 Oct 2021 11:25) (18 - 19)  SpO2: 96% (09 Oct 2021 11:25) (94% - 96%)  GEN: NAD  HEENT: normocephalic and atraumatic. EOMI. PERRL.    NECK: Supple.  No lymphadenopathy   LUNGS: Coarse crackles, o wheezing or fine rales   HEART: Irregular rate and rhythm  ABDOMEN: Soft, nontender, and nondistended.  Positive bowel sounds.    : No CVA tenderness  EXTREMITIES: 1+ edema.  NEUROLOGIC: grossly intact.  PSYCHIATRIC: Appropriate affect .  SKIN: No ulceration or induration present.    Labs:                        8.3    6.07  )-----------( 224      ( 09 Oct 2021 07:10 )             27.3     10-09    137  |  105  |  51<H>  ----------------------------<  139<H>  3.7   |  23  |  3.50<H>    Ca    8.1<L>      09 Oct 2021 07:10  Phos  6.0     10-09  Mg     2.5     10-09    TPro  6.4  /  Alb  2.7<L>  /  TBili  0.4  /  DBili  x   /  AST  13<L>  /  ALT  21  /  AlkPhos  35<L>  10-09    Culture - Blood (collected 10-07-21 @ 03:57)  Source: .Blood Blood-Peripheral    Culture - Blood (collected 10-07-21 @ 03:57)  Source: .Blood Blood-Peripheral    Culture - Urine (collected 10-07-21 @ 02:42)  Source: Clean Catch Clean Catch (Midstream)  Final Report (10-08-21 @ 12:49):    <10,000 CFU/mL Normal Urogenital Johnna    WBC Count: 6.07 K/uL (10-09-21 @ 07:10)  WBC Count: 7.35 K/uL (10-08-21 @ 07:58)  WBC Count: 8.57 K/uL (10-07-21 @ 04:23)  WBC Count: 8.49 K/uL (10-06-21 @ 21:49)    Creatinine, Serum: 3.50 mg/dL (10-09-21 @ 07:10)  Creatinine, Serum: 3.10 mg/dL (10-08-21 @ 07:58)  Creatinine, Serum: 2.00 mg/dL (10-07-21 @ 04:23)  Creatinine, Serum: 2.30 mg/dL (10-06-21 @ 21:49)    C-Reactive Protein, Serum: 16 mg/L (10-08-21 @ 22:27)    Ferritin, Serum: 28 ng/mL (10-08-21 @ 22:26)    Sedimentation Rate, Erythrocyte: 28 mm/hr (10-08-21 @ 16:57)    Procalcitonin, Serum: 0.14 ng/mL (10-07-21 @ 04:23)     SARS-CoV-2: NotDetec (10-06-21 @ 21:49)  Rapid RVP Result: NotDetec (10-06-21 @ 21:49)        All imaging and other data have been reviewed.  < from: CT Chest No Cont (10.07.21 @ 00:03) >  IMPRESSION:  Multilobar pneumonia.  Mild cardiomegaly.  No small bowel obstruction or active bowel inflammation.    Assessment and Plan:   77 yo man with PMH of HTN, DM2, CAD, HepC, CKD and afib was admitted from group [facility] with subjective fevers, SOB, weakness, and brief episodes of palpitations, and urinary frequency for 3 days.  No leukocytosis or documented fever  CT with Multilobar pneumonia  Procalcitonin 0.14     1- Multilobar pneumonia   - Blood culture x 2 NGTD  - UA and UC negative   - Follow MRSA PCR and legionella U ag  - Continue Zosyn 3.375gm q8h   - Continue azithromycin 500mg daily   - Follow Creat, worsening in last few days.     Will follow.    Suzie Garner MD  Division of Infectious Diseases   Cell [phone] between 8am and 6pm   After 6pm and weekends please call ID service at [phone].

## 2021-10-10 LAB
% ALBUMIN: 57.6 % — SIGNIFICANT CHANGE UP
% ALPHA 1: 5.6 % — SIGNIFICANT CHANGE UP
% ALPHA 2: 13.1 % — SIGNIFICANT CHANGE UP
% BETA: 11.8 % — SIGNIFICANT CHANGE UP
% GAMMA: 11.9 % — SIGNIFICANT CHANGE UP
% M SPIKE: SIGNIFICANT CHANGE UP
ALBUMIN SERPL ELPH-MCNC: 2.9 G/DL — LOW (ref 3.3–5)
ALBUMIN SERPL ELPH-MCNC: 3.4 G/DL — LOW (ref 3.6–5.5)
ALBUMIN/GLOB SERPL ELPH: 1.4 RATIO — SIGNIFICANT CHANGE UP
ALP SERPL-CCNC: 37 U/L — LOW (ref 40–120)
ALPHA1 GLOB SERPL ELPH-MCNC: 0.3 G/DL — SIGNIFICANT CHANGE UP (ref 0.1–0.4)
ALPHA2 GLOB SERPL ELPH-MCNC: 0.8 G/DL — SIGNIFICANT CHANGE UP (ref 0.5–1)
ALT FLD-CCNC: 22 U/L — SIGNIFICANT CHANGE UP (ref 12–78)
ANION GAP SERPL CALC-SCNC: 7 MMOL/L — SIGNIFICANT CHANGE UP (ref 5–17)
AST SERPL-CCNC: 14 U/L — LOW (ref 15–37)
B-GLOBULIN SERPL ELPH-MCNC: 0.7 G/DL — SIGNIFICANT CHANGE UP (ref 0.5–1)
BASOPHILS # BLD AUTO: 0.02 K/UL — SIGNIFICANT CHANGE UP (ref 0–0.2)
BASOPHILS NFR BLD AUTO: 0.3 % — SIGNIFICANT CHANGE UP (ref 0–2)
BILIRUB SERPL-MCNC: 0.3 MG/DL — SIGNIFICANT CHANGE UP (ref 0.2–1.2)
BUN SERPL-MCNC: 44 MG/DL — HIGH (ref 7–23)
C3 SERPL-MCNC: 145 MG/DL — SIGNIFICANT CHANGE UP (ref 81–157)
C4 SERPL-MCNC: 30 MG/DL — SIGNIFICANT CHANGE UP (ref 13–39)
CALCIUM SERPL-MCNC: 8.4 MG/DL — LOW (ref 8.5–10.1)
CHLORIDE SERPL-SCNC: 106 MMOL/L — SIGNIFICANT CHANGE UP (ref 96–108)
CO2 SERPL-SCNC: 25 MMOL/L — SIGNIFICANT CHANGE UP (ref 22–31)
CREAT SERPL-MCNC: 3.2 MG/DL — HIGH (ref 0.5–1.3)
EOSINOPHIL # BLD AUTO: 0.16 K/UL — SIGNIFICANT CHANGE UP (ref 0–0.5)
EOSINOPHIL NFR BLD AUTO: 2.4 % — SIGNIFICANT CHANGE UP (ref 0–6)
GAMMA GLOBULIN: 0.7 G/DL — SIGNIFICANT CHANGE UP (ref 0.6–1.6)
GLUCOSE SERPL-MCNC: 130 MG/DL — HIGH (ref 70–99)
HCT VFR BLD CALC: 28.4 % — LOW (ref 39–50)
HGB BLD-MCNC: 8.6 G/DL — LOW (ref 13–17)
IMM GRANULOCYTES NFR BLD AUTO: 0.3 % — SIGNIFICANT CHANGE UP (ref 0–1.5)
INTERPRETATION SERPL IFE-IMP: SIGNIFICANT CHANGE UP
LYMPHOCYTES # BLD AUTO: 0.86 K/UL — LOW (ref 1–3.3)
LYMPHOCYTES # BLD AUTO: 12.8 % — LOW (ref 13–44)
M-SPIKE: SIGNIFICANT CHANGE UP (ref 0–0)
MCHC RBC-ENTMCNC: 25.7 PG — LOW (ref 27–34)
MCHC RBC-ENTMCNC: 30.3 GM/DL — LOW (ref 32–36)
MCV RBC AUTO: 84.8 FL — SIGNIFICANT CHANGE UP (ref 80–100)
MONOCYTES # BLD AUTO: 0.49 K/UL — SIGNIFICANT CHANGE UP (ref 0–0.9)
MONOCYTES NFR BLD AUTO: 7.3 % — SIGNIFICANT CHANGE UP (ref 2–14)
NEUTROPHILS # BLD AUTO: 5.15 K/UL — SIGNIFICANT CHANGE UP (ref 1.8–7.4)
NEUTROPHILS NFR BLD AUTO: 76.9 % — SIGNIFICANT CHANGE UP (ref 43–77)
NRBC # BLD: 0 /100 WBCS — SIGNIFICANT CHANGE UP (ref 0–0)
PLATELET # BLD AUTO: 245 K/UL — SIGNIFICANT CHANGE UP (ref 150–400)
POTASSIUM SERPL-MCNC: 3.7 MMOL/L — SIGNIFICANT CHANGE UP (ref 3.5–5.3)
POTASSIUM SERPL-SCNC: 3.7 MMOL/L — SIGNIFICANT CHANGE UP (ref 3.5–5.3)
PROT PATTERN SERPL ELPH-IMP: SIGNIFICANT CHANGE UP
PROT SERPL-MCNC: 7 G/DL — SIGNIFICANT CHANGE UP (ref 6–8.3)
RBC # BLD: 3.35 M/UL — LOW (ref 4.2–5.8)
RBC # FLD: 17.1 % — HIGH (ref 10.3–14.5)
SODIUM SERPL-SCNC: 138 MMOL/L — SIGNIFICANT CHANGE UP (ref 135–145)
WBC # BLD: 6.7 K/UL — SIGNIFICANT CHANGE UP (ref 3.8–10.5)
WBC # FLD AUTO: 6.7 K/UL — SIGNIFICANT CHANGE UP (ref 3.8–10.5)

## 2021-10-10 RX ORDER — AZITHROMYCIN 500 MG/1
500 TABLET, FILM COATED ORAL DAILY
Refills: 0 | Status: COMPLETED | OUTPATIENT
Start: 2021-10-10 | End: 2021-10-12

## 2021-10-10 RX ADMIN — ONDANSETRON 4 MILLIGRAM(S): 8 TABLET, FILM COATED ORAL at 12:14

## 2021-10-10 RX ADMIN — PIPERACILLIN AND TAZOBACTAM 25 GRAM(S): 4; .5 INJECTION, POWDER, LYOPHILIZED, FOR SOLUTION INTRAVENOUS at 17:25

## 2021-10-10 RX ADMIN — Medication 4 MILLIGRAM(S): at 20:52

## 2021-10-10 RX ADMIN — Medication 5 MILLIGRAM(S): at 17:26

## 2021-10-10 RX ADMIN — BUDESONIDE AND FORMOTEROL FUMARATE DIHYDRATE 2 PUFF(S): 160; 4.5 AEROSOL RESPIRATORY (INHALATION) at 09:45

## 2021-10-10 RX ADMIN — BUDESONIDE AND FORMOTEROL FUMARATE DIHYDRATE 2 PUFF(S): 160; 4.5 AEROSOL RESPIRATORY (INHALATION) at 17:36

## 2021-10-10 RX ADMIN — IRON SUCROSE 100 MILLIGRAM(S): 20 INJECTION, SOLUTION INTRAVENOUS at 17:28

## 2021-10-10 RX ADMIN — NYSTATIN CREAM 1 APPLICATION(S): 100000 CREAM TOPICAL at 05:19

## 2021-10-10 RX ADMIN — Medication 0.2 MILLIGRAM(S): at 17:26

## 2021-10-10 RX ADMIN — Medication 0.2 MILLIGRAM(S): at 05:25

## 2021-10-10 RX ADMIN — ARIPIPRAZOLE 30 MILLIGRAM(S): 15 TABLET ORAL at 12:15

## 2021-10-10 RX ADMIN — Medication 5 MILLIGRAM(S): at 12:15

## 2021-10-10 RX ADMIN — PANTOPRAZOLE SODIUM 40 MILLIGRAM(S): 20 TABLET, DELAYED RELEASE ORAL at 05:26

## 2021-10-10 RX ADMIN — Medication 5 MILLIGRAM(S): at 05:25

## 2021-10-10 RX ADMIN — AMLODIPINE BESYLATE 10 MILLIGRAM(S): 2.5 TABLET ORAL at 05:26

## 2021-10-10 RX ADMIN — Medication 100 MILLIGRAM(S): at 05:25

## 2021-10-10 RX ADMIN — APIXABAN 5 MILLIGRAM(S): 2.5 TABLET, FILM COATED ORAL at 17:26

## 2021-10-10 RX ADMIN — PIPERACILLIN AND TAZOBACTAM 25 GRAM(S): 4; .5 INJECTION, POWDER, LYOPHILIZED, FOR SOLUTION INTRAVENOUS at 05:20

## 2021-10-10 RX ADMIN — Medication 100 MILLIGRAM(S): at 12:15

## 2021-10-10 RX ADMIN — AZITHROMYCIN 500 MILLIGRAM(S): 500 TABLET, FILM COATED ORAL at 12:16

## 2021-10-10 RX ADMIN — Medication 1 MILLIGRAM(S): at 12:15

## 2021-10-10 RX ADMIN — LAMOTRIGINE 100 MILLIGRAM(S): 25 TABLET, ORALLY DISINTEGRATING ORAL at 12:14

## 2021-10-10 RX ADMIN — Medication 4 MILLIGRAM(S): at 09:45

## 2021-10-10 RX ADMIN — MIRTAZAPINE 30 MILLIGRAM(S): 45 TABLET, ORALLY DISINTEGRATING ORAL at 21:02

## 2021-10-10 RX ADMIN — Medication 5 MILLIGRAM(S): at 23:56

## 2021-10-10 RX ADMIN — Medication 100 MILLIGRAM(S): at 21:01

## 2021-10-10 RX ADMIN — Medication 150 MILLIGRAM(S): at 12:14

## 2021-10-10 RX ADMIN — APIXABAN 5 MILLIGRAM(S): 2.5 TABLET, FILM COATED ORAL at 05:26

## 2021-10-10 RX ADMIN — PREGABALIN 1000 MICROGRAM(S): 225 CAPSULE ORAL at 12:14

## 2021-10-10 RX ADMIN — Medication 81 MILLIGRAM(S): at 12:15

## 2021-10-10 RX ADMIN — NYSTATIN CREAM 1 APPLICATION(S): 100000 CREAM TOPICAL at 17:26

## 2021-10-10 NOTE — PROGRESS NOTE ADULT - ASSESSMENT
[ASSESSMENT and  PLAN]  75yo M admitted with HTN urgency,  /100 and multilobar pneumonia; history of GI bleed with diverticulosis; also noted to have renal insufficiency  - empirically started on oral B12 and folate  - B12 level is low normal; okay to continue; folate level is adequate; can stop folic aci  - iron studies c/w iron deficiency; started on IV iron therapy  - remains on  Apixiban 5mg q12h for Afib  - awaiting work-up for plasma cell disorder (SPEP/ADIA/immunoglobulin levels)  - transfuse to maintain Hg >7  - will follow

## 2021-10-10 NOTE — PROGRESS NOTE ADULT - SUBJECTIVE AND OBJECTIVE BOX
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 Pt states he would intermittently feel chills and feverish, recorded no temps at group home since last couple of days, he states progressively worsening of dyspnea on exertion and rest, patient unable to walk short distances or climb stairs due to dyspnea, however denies orthopnea, cough, sputum production, night sweats. Patient mentioned some dizziness associated with lower extremities weakness, usually ambulates independently but now feels unable to hold his weight. Regarding palpitations, pt states for 3 days his heart would skip a beat, return to normal. Self limited episodes under a minute, no associated chest pain. Pt also describes urinary frequency beyond baseline, denies dysuria or incontinence, hematuria, constipation.     ED course:   VS: Afebrile, HR: 80 BP: 220/100->235/116-> 189/85, Spo2: 98 on NC RR: 20  Labs significant for low stable hemoglobin, BUN/creatinine: 36/2.30, Trop 0.149, Pro BMP 8061.   EKG on admission showed Afib @ 64 bpm, incompleta right BBB, LVH  CT head shows no  evidence of acute intracranial hemorrhage, midline shift or CT evidence of acute territorial infarct.  CT chest A/P demonstrated Multilobar pneumonia. Mild cardiomegaly. No mall bowel obstruction or active bowel inflammation.  Patient was given in the ED 10 mg IVP hydralazine 10 mg Labetalol IVP 1x, 2000 cc bolus NS 1x       INTERVAL HPI:  10/07/21: Pt seen and examined at bedside; in no acute distress; complains of intermittent palpitation and transient chest pain. On Zosyn 3.375gm q8h + azithromycin 500mg daily; concern for medication compliance; ??capacity. will consult psych. K replaced  10/8/21: Pt seen and examined at bedside. Pt is lethargic and is sleeping although answers with yes and no questions. He has no acute complaints. Denies fevers, chills, chest pain, SOB, abdominal pain, n/v/d/c. On Zosyn 3.375gm q8h + azithromycin 500mg daily. Blood pressure improving. PT recommended LETI yesterday.  10/9/21: Pt seen and examined at bedside. Pt with no complaints this am. Worsening Cr function this am; Nephro on board. Will continue to monitor.   10/10/21: Pt seen and examined at bedside with no complaints this am; On renally dosed zosyn; improving renal function this am; pending psych consultation with Dr Winkler.       OVERNIGHT EVENTS: None       Home Medications:  cloNIDine 0.1 mg oral tablet: 2 tab(s) orally 3 times a day (07 Oct 2021 03:41)  digoxin 125 mcg (0.125 mg) oral tablet: 1 tab(s) orally once a day (07 Oct 2021 03:41)  Eliquis 5 mg oral tablet: 1 tab(s) orally 2 times a day (07 Oct 2021 03:41)  famotidine 40 mg oral tablet: 1 tab(s) orally once a day (at bedtime) (07 Oct 2021 03:41)  fenofibrate 145 mg oral tablet: 1 tab(s) orally once a day (07 Oct 2021 03:41)  labetalol 100 mg oral tablet: 1 tab(s) orally 2 times a day (07 Oct 2021 03:41)  Lasix 20 mg oral tablet: 1 tab(s) orally 2 times a day (07 Oct 2021 03:41)  oxybutynin 5 mg oral tablet: 1 tab(s) orally 4 times a day (07 Oct 2021 03:41)  pantoprazole 40 mg oral delayed release tablet: 1 tab(s) orally once a day (before a meal) (07 Oct 2021 03:41)  Symbicort 160 mcg-4.5 mcg/inh inhalation aerosol: 2 puff(s) inhaled 2 times a day (07 Oct 2021 03:41)      MEDICATIONS  (STANDING):  amLODIPine   Tablet 10 milliGRAM(s) Oral daily  apixaban 5 milliGRAM(s) Oral every 12 hours  ARIPiprazole 30 milliGRAM(s) Oral daily  aspirin enteric coated 81 milliGRAM(s) Oral daily  budesonide 160 MICROgram(s)/formoterol 4.5 MICROgram(s) Inhaler 2 Puff(s) Inhalation two times a day  cloNIDine 0.2 milliGRAM(s) Oral two times a day  cyanocobalamin 1000 MICROGram(s) Oral daily  dextrose 40% Gel 15 Gram(s) Oral once  dextrose 5%. 1000 milliLiter(s) (50 mL/Hr) IV Continuous <Continuous>  dextrose 50% Injectable 25 Gram(s) IV Push once  doxazosin 4 milliGRAM(s) Oral <User Schedule>  famotidine    Tablet 20 milliGRAM(s) Oral every 48 hours  folic acid 1 milliGRAM(s) Oral daily  glucagon  Injectable 1 milliGRAM(s) IntraMuscular once  hydrALAZINE 100 milliGRAM(s) Oral every 8 hours  insulin lispro (ADMELOG) corrective regimen sliding scale   SubCutaneous three times a day before meals  iron sucrose Injectable 100 milliGRAM(s) IV Push every 24 hours  lamoTRIgine 100 milliGRAM(s) Oral daily  mirtazapine 30 milliGRAM(s) Oral at bedtime  nystatin Powder 1 Application(s) Topical two times a day  oxybutynin 5 milliGRAM(s) Oral four times a day  pantoprazole    Tablet 40 milliGRAM(s) Oral before breakfast  piperacillin/tazobactam IVPB.. 3.375 Gram(s) IV Intermittent every 12 hours  venlafaxine XR. 150 milliGRAM(s) Oral daily    MEDICATIONS  (PRN):  acetaminophen   Tablet .. 650 milliGRAM(s) Oral every 6 hours PRN Temp greater or equal to 38C (100.4F), Mild Pain (1 - 3)  melatonin 3 milliGRAM(s) Oral at bedtime PRN Insomnia  ondansetron Injectable 4 milliGRAM(s) IV Push every 8 hours PRN Nausea and/or Vomiting  sodium chloride 0.65% Nasal 1 Spray(s) Both Nostrils two times a day PRN Nasal Congestion      No Known Allergies      Social History:  Tobacco: quit in 1980, not active smoker  EtOH: denies   Recreational drug use: cocaine several years ago "a few times" has not used in "many years"  Lives with: [facility]; group home; no nursing assistance; observation is there  Ambulates: independent, denies walker or cane but uses guard railings  ADLs: independent, but states need for assistance in bathing, cooking; independent with feeding, ambulating  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[place]  Vaccinations: Moderna x2 doses last dose in May (07 Oct 2021 01:24)      REVIEW OF SYSTEMS:  CONSTITUTIONAL: No fever, No chills, No fatigue, No myalgia, No Body ache, No Weakness  EYES: No eye pain,  No visual disturbances, No discharge, No Redness  ENMT: No ear pain, No nose bleed, No vertigo; No sinus pain, No throat pain, No Congestion  NECK: No pain, No stiffness  RESPIRATORY: No cough, No wheezing, No hemoptysis, No shortness of breath  CARDIOVASCULAR: No chest pain, No palpitations  GASTROINTESTINAL: No abdominal pain, No epigastric pain. No nausea, No vomiting, No diarrhea, No constipation; [  ] BM  GENITOURINARY: No dysuria, No frequency, No urgency, No hematuria, No incontinence  NEUROLOGICAL: No headaches, No dizziness, No numbness, No tingling, No tremors, No weakness  EXTREMITIES: No Swelling, No Pain, No Edema  SKIN: [  ] No itching, burning, rashes, or lesions   MUSCULOSKELETAL: No joint pain, No joint swelling; No muscle pain, No back pain, No extremity pain  PSYCHIATRIC: No depression, No anxiety, No mood swings, No difficulty sleeping at night  PAIN SCALE: [ x ] None  [  ] Other-       Vital Signs Last 24 Hrs  T(C): 36.4 (10 Oct 2021 04:15), Max: 36.7 (09 Oct 2021 11:25)  T(F): 97.5 (10 Oct 2021 04:15), Max: 98 (09 Oct 2021 11:25)  HR: 65 (10 Oct 2021 04:15) (60 - 65)  BP: 170/76 (10 Oct 2021 04:15) (135/76 - 170/76)  BP(mean): --  RR: 18 (10 Oct 2021 04:15) (18 - 18)  SpO2: 98% (10 Oct 2021 04:15) (95% - 98%)    CAPILLARY BLOOD GLUCOSE      POCT Blood Glucose.: 149 mg/dL (10 Oct 2021 08:00)  POCT Blood Glucose.: 148 mg/dL (09 Oct 2021 22:18)  POCT Blood Glucose.: 117 mg/dL (09 Oct 2021 16:37)  POCT Blood Glucose.: 129 mg/dL (09 Oct 2021 11:26)      I&O's Summary    09 Oct 2021 07:01  -  10 Oct 2021 07:00  --------------------------------------------------------  IN: 555 mL / OUT: 200 mL / NET: 355 mL      PHYSICAL EXAM:  GENERAL:  [ x ] NAD, [ x ] Well appearing, [  ] Agitated, [  ] Drowsy, [  ] Lethargy, [  ] Confused   HEAD:  [ x ] Normal, [  ] Other  EYES:  [ x ] EOMI, [ x ] PERRLA, [ x ] Conjunctiva and sclera clear normal, [  ] Other, [  ] Pallor, [  ] Discharge  ENMT:  [ x ] Normal, [ x ] Moist mucous membranes, [  ] Good dentition, [  ] No thrush  NECK:  [ x ] Supple, [ x ] No JVD, [  ] Normal thyroid, [  ] Lymphadenopathy, [  ] Other  CHEST/LUNG:  [ x ] Clear to auscultation bilaterally, [ x ] Breath Sounds B/L / decreased, [  ] Poor effort, [  ] No rales, [  ] No rhonchi, [  ] No wheezing  HEART:  [ x ] Regular rate and rhythm, [  ] Tachycardia, [  ] Bradycardia, [  ] Irregular, [  ] No murmurs, No rubs, No gallops, [  ] PPM in place (Mfr:  )  ABDOMEN:  [ x ] Soft, [ x ] Nontender, [ x ] Nondistended, [  ] No mass, [  ] Bowel sounds present, [  ] Obese  NERVOUS SYSTEM:  [ x ] Alert & Oriented x3, [  ] Nonfocal, [  ] Confusion, [  ] Encephalopathic, [  ] Sedated, [  ] Unable to assess, [  ] Dementia, [  ] Other-  EXTREMITIES:  [ x ] 2+ Peripheral Pulses, No clubbing, No cyanosis,  [  ] Edema B/L lower EXT, [  ] PVD stasis skin changes B/L lower EXT, [  ] Wound  LYMPH:  No lymphadenopathy noted      DIET: Diet, Consistent Carbohydrate Renal w/Evening Snack:   DASH/TLC Sodium & Cholesterol Restricted (10-07-21 @ 02:46)      LABS:                        8.6    6.70  )-----------( 245      ( 10 Oct 2021 08:56 )             28.4     10 Oct 2021 08:57    138    |  106    |  44     ----------------------------<  130    3.7     |  25     |  3.20     Ca    8.4        10 Oct 2021 08:57    TPro  7.0    /  Alb  2.9    /  TBili  0.3    /  DBili  x      /  AST  14     /  ALT  22     /  AlkPhos  37     10 Oct 2021 08:57        Culture Results:   No growth to date. (10-07 @ 03:57)  Culture Results:   No growth to date. (10-07 @ 03:57)  Culture Results:   <10,000 CFU/mL Normal Urogenital Johnna (10-07 @ 02:42)      CARDIAC MARKERS ( 07 Oct 2021 14:20 )  .112 ng/mL / x     / x     / x     / x      CARDIAC MARKERS ( 07 Oct 2021 04:23 )  .145 ng/mL / x     / 106 U/L / x     / 2.2 ng/mL  CARDIAC MARKERS ( 06 Oct 2021 21:51 )  .149 ng/mL / x     / x     / x     / x            Culture - Blood (collected 07 Oct 2021 03:57)  Source: .Blood Blood-Peripheral  Preliminary Report (08 Oct 2021 04:00):    No growth to date.    Culture - Blood (collected 07 Oct 2021 03:57)  Source: .Blood Blood-Peripheral  Preliminary Report (08 Oct 2021 04:00):    No growth to date.    Culture - Urine (collected 07 Oct 2021 02:42)  Source: Clean Catch Clean Catch (Midstream)  Final Report (08 Oct 2021 12:49):    <10,000 CFU/mL Normal Urogenital Johnna       Anemia Panel:  Iron Total, Serum: 28 ug/dL (10-08-21 @ 22:27)  Iron - Total Binding Capacity.: 337 ug/dL (10-08-21 @ 22:27)  Vitamin B12, Serum: 413 pg/mL (10-08-21 @ 22:26)  Folate, Serum: 9.9 ng/mL (10-08-21 @ 22:26)  Ferritin, Serum: 28 ng/mL (10-08-21 @ 22:26)  Absolute Reticulocytes: 49.4 K/uL (10-08-21 @ 16:57)      Thyroid Panel:  Thyroid Stimulating Hormone, Serum: 1.35 uIU/mL (10-07-21 @ 04:23)            Serum Pro-Brain Natriuretic Peptide: 7438 pg/mL (10-07-21 @ 04:23)  Serum Pro-Brain Natriuretic Peptide: 8061 pg/mL (10-06-21 @ 21:49)      RADIOLOGY & ADDITIONAL TESTS:      HEALTH ISSUES - PROBLEM Dx:  PNA (pneumonia)    Elevated troponin    Chronic atrial fibrillation    Stage 3 chronic kidney disease    Chronic diastolic congestive heart failure    CAD (coronary artery disease)    DVT prophylaxis    Anemia of chronic disease    DM (diabetes mellitus), type 2    Depression, major    Abnormal CT scan of lung    Hypertensive urgency          Consultant(s) Notes Reviewed:  [ x ] YES     Care Discussed with [ x ] Consultants, [ x ] Patient, [  ] Family, [  ] HCP, [  ] , [  ] Social Service, [  ] RN, [  ] Physical Therapy, [  ] Palliative Care Team  DVT PPX: [  ] Lovenox, [ x ] SC Heparin, [  ] Coumadin, [  ] Xarelto, [  ] Eliquis, [  ] Pradaxa, [  ] IV Heparin drip, [  ] SCD, [  ] Ambulation, [  ] Contraindicated 2/2 GI Bleed, [  ] Contraindicated 2/2  Bleed, [  ] Contraindicated 2/2 Brain Bleed  Advanced Directive: [ x ] None, [  ] DNR/DNI Patient is a 76y old  Male who presents with a chief complaint of multilobar PNA, afib (10 Oct 2021 06:03)        History of Present Illness:   77 yo M with PMHx of Type 2 DM (not on insulin), BPH, CAD s/p stents >4 years ago (not on plavix), diverticulitis (hospitalized 07/2020 at [facility]), GIB 2/2 diverticulosis (2020), anxiety, Lupus, HTN, HLD, CKD stage 3, HepC, 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 Pt states he would intermittently feel chills and feverish, recorded no temps at group home since last couple of days, he states progressively worsening of dyspnea on exertion and rest, patient unable to walk short distances or climb stairs due to dyspnea, however denies orthopnea, cough, sputum production, night sweats. Patient mentioned some dizziness associated with lower extremities weakness, usually ambulates independently but now feels unable to hold his weight. Regarding palpitations, pt states for 3 days his heart would skip a beat, return to normal. Self limited episodes under a minute, no associated chest pain. Pt also describes urinary frequency beyond baseline, denies dysuria or incontinence, hematuria, constipation.     ED course:   VS: Afebrile, HR: 80 BP: 220/100->235/116-> 189/85, Spo2: 98 on NC RR: 20  Labs significant for low stable hemoglobin, BUN/creatinine: 36/2.30, Trop 0.149, Pro BMP 8061.   EKG on admission showed Afib @ 64 bpm, incompleta right BBB, LVH  CT head shows no  evidence of acute intracranial hemorrhage, midline shift or CT evidence of acute territorial infarct.  CT chest A/P demonstrated Multilobar pneumonia. Mild cardiomegaly. No mall bowel obstruction or active bowel inflammation.  Patient was given in the ED 10 mg IVP hydralazine 10 mg Labetalol IVP 1x, 2000 cc bolus NS 1x       INTERVAL HPI:  10/07/21: Pt seen and examined at bedside; in no acute distress; complains of intermittent palpitation and transient chest pain. On Zosyn 3.375gm q8h + azithromycin 500mg daily; concern for medication compliance; ??capacity. will consult psych. K replaced  10/8/21: Pt seen and examined at bedside. Pt is lethargic and is sleeping although answers with yes and no questions. He has no acute complaints. Denies fevers, chills, chest pain, SOB, abdominal pain, n/v/d/c. On Zosyn 3.375gm q8h + azithromycin 500mg daily. Blood pressure improving. PT recommended LETI yesterday.  10/9/21: Pt seen and examined at bedside. Pt with no complaints this am. Worsening Cr function this am; Nephro on board. Will continue to monitor.   10/10/21: Pt seen and examined at bedside with no complaints this am; On renally dosed zosyn; Add Zithromax, improving renal function this am; pending psych consultation with Dr Winkler.       OVERNIGHT EVENTS: None       Home Medications:  cloNIDine 0.1 mg oral tablet: 2 tab(s) orally 3 times a day (07 Oct 2021 03:41)  digoxin 125 mcg (0.125 mg) oral tablet: 1 tab(s) orally once a day (07 Oct 2021 03:41)  Eliquis 5 mg oral tablet: 1 tab(s) orally 2 times a day (07 Oct 2021 03:41)  famotidine 40 mg oral tablet: 1 tab(s) orally once a day (at bedtime) (07 Oct 2021 03:41)  fenofibrate 145 mg oral tablet: 1 tab(s) orally once a day (07 Oct 2021 03:41)  labetalol 100 mg oral tablet: 1 tab(s) orally 2 times a day (07 Oct 2021 03:41)  Lasix 20 mg oral tablet: 1 tab(s) orally 2 times a day (07 Oct 2021 03:41)  oxybutynin 5 mg oral tablet: 1 tab(s) orally 4 times a day (07 Oct 2021 03:41)  pantoprazole 40 mg oral delayed release tablet: 1 tab(s) orally once a day (before a meal) (07 Oct 2021 03:41)  Symbicort 160 mcg-4.5 mcg/inh inhalation aerosol: 2 puff(s) inhaled 2 times a day (07 Oct 2021 03:41)      MEDICATIONS  (STANDING):  amLODIPine   Tablet 10 milliGRAM(s) Oral daily  apixaban 5 milliGRAM(s) Oral every 12 hours  ARIPiprazole 30 milliGRAM(s) Oral daily  aspirin enteric coated 81 milliGRAM(s) Oral daily  budesonide 160 MICROgram(s)/formoterol 4.5 MICROgram(s) Inhaler 2 Puff(s) Inhalation two times a day  cloNIDine 0.2 milliGRAM(s) Oral two times a day  cyanocobalamin 1000 MICROGram(s) Oral daily  dextrose 40% Gel 15 Gram(s) Oral once  dextrose 5%. 1000 milliLiter(s) (50 mL/Hr) IV Continuous <Continuous>  dextrose 50% Injectable 25 Gram(s) IV Push once  doxazosin 4 milliGRAM(s) Oral <User Schedule>  famotidine    Tablet 20 milliGRAM(s) Oral every 48 hours  folic acid 1 milliGRAM(s) Oral daily  glucagon  Injectable 1 milliGRAM(s) IntraMuscular once  hydrALAZINE 100 milliGRAM(s) Oral every 8 hours  insulin lispro (ADMELOG) corrective regimen sliding scale   SubCutaneous three times a day before meals  iron sucrose Injectable 100 milliGRAM(s) IV Push every 24 hours  lamoTRIgine 100 milliGRAM(s) Oral daily  mirtazapine 30 milliGRAM(s) Oral at bedtime  nystatin Powder 1 Application(s) Topical two times a day  oxybutynin 5 milliGRAM(s) Oral four times a day  pantoprazole    Tablet 40 milliGRAM(s) Oral before breakfast  piperacillin/tazobactam IVPB.. 3.375 Gram(s) IV Intermittent every 12 hours  venlafaxine XR. 150 milliGRAM(s) Oral daily    MEDICATIONS  (PRN):  acetaminophen   Tablet .. 650 milliGRAM(s) Oral every 6 hours PRN Temp greater or equal to 38C (100.4F), Mild Pain (1 - 3)  melatonin 3 milliGRAM(s) Oral at bedtime PRN Insomnia  ondansetron Injectable 4 milliGRAM(s) IV Push every 8 hours PRN Nausea and/or Vomiting  sodium chloride 0.65% Nasal 1 Spray(s) Both Nostrils two times a day PRN Nasal Congestion      No Known Allergies      Social History:  Tobacco: quit in 1980, not active smoker  EtOH: denies   Recreational drug use: cocaine several years ago "a few times" has not used in "many years"  Lives with: [facility]; group home; no nursing assistance; observation is there  Ambulates: independent, denies walker or cane but uses guard railings  ADLs: independent, but states need for assistance in bathing, cooking; independent with feeding, ambulating  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 No nausea, No vomiting, No diarrhea, No constipation; [  ] BM  GENITOURINARY: No dysuria, No frequency, No urgency, No hematuria, No incontinence  NEUROLOGICAL: No headaches, No dizziness, No numbness, No tingling, No tremors, No weakness  EXTREMITIES: No Swelling, No Pain, No Edema  SKIN: [ x ] No itching, burning, rashes, or lesions   MUSCULOSKELETAL: No joint pain, No joint swelling; No muscle pain, No back pain, No extremity pain  PSYCHIATRIC: No depression, No anxiety, No mood swings, No difficulty sleeping at night  PAIN SCALE: [ x ] None  [  ] Other-       Vital Signs Last 24 Hrs  T(C): 36.4 (10 Oct 2021 04:15), Max: 36.7 (09 Oct 2021 11:25)  T(F): 97.5 (10 Oct 2021 04:15), Max: 98 (09 Oct 2021 11:25)  HR: 65 (10 Oct 2021 04:15) (60 - 65)  BP: 170/76 (10 Oct 2021 04:15) (135/76 - 170/76)  BP(mean): --  RR: 18 (10 Oct 2021 04:15) (18 - 18)  SpO2: 98% (10 Oct 2021 04:15) (95% - 98%)    CAPILLARY BLOOD GLUCOSE      POCT Blood Glucose.: 149 mg/dL (10 Oct 2021 08:00)  POCT Blood Glucose.: 148 mg/dL (09 Oct 2021 22:18)  POCT Blood Glucose.: 117 mg/dL (09 Oct 2021 16:37)  POCT Blood Glucose.: 129 mg/dL (09 Oct 2021 11:26)      I&O's Summary    09 Oct 2021 07:01  -  10 Oct 2021 07:00  --------------------------------------------------------  IN: 555 mL / OUT: 200 mL / NET: 355 mL      PHYSICAL EXAM:  GENERAL:  [ x ] NAD, [ x ] Well appearing, [  ] Agitated, [  ] Drowsy, [  ] Lethargy, [  ] Confused   HEAD:  [ x ] Normal, [  ] Other  EYES:  [ x ] EOMI, [ x ] PERRLA, [ x ] Conjunctiva and sclera clear normal, [  ] Other, [  ] Pallor, [  ] Discharge  ENMT:  [ x ] Normal, [ x ] Moist mucous membranes, [  ] Good dentition, [  ] No thrush  NECK:  [ x ] Supple, [ x ] No JVD, [  ] Normal thyroid, [  ] Lymphadenopathy, [  ] Other  CHEST/LUNG:  [ x ] Clear to auscultation bilaterally, [ x ] Breath Sounds B/L / decreased, [  ] Poor effort, [  ] No rales, [  ] No rhonchi, [  ] No wheezing  HEART:  [ x ] Regular rate and rhythm, [  ] Tachycardia, [  ] Bradycardia, [  ] Irregular, [  ] No murmurs, No rubs, No gallops, [  ] PPM in place (Mfr:  )  ABDOMEN:  [ x ] Soft, [ x ] Nontender, [ x ] Nondistended, [  ] No mass, [  ] Bowel sounds present, [  ] Obese  NERVOUS SYSTEM:  [ x ] Alert & Oriented x3, [  ] Nonfocal, [  ] Confusion, [  ] Encephalopathic, [  ] Sedated, [  ] Unable to assess, [  ] Dementia, [  ] Other-  EXTREMITIES:  [ x ] 2+ Peripheral Pulses, No clubbing, No cyanosis,  [  ] Edema B/L lower EXT, [  ] PVD stasis skin changes B/L lower EXT, [  ] Wound  LYMPH:  No lymphadenopathy noted  SKIN:  [ x ] No rashes or lesions, [  ] Pressure ulcers, [  ] Ecchymosis, [  ] Skin tears, [ x ] Other- Tattoos on EXT         DIET: Diet, Consistent Carbohydrate Renal w/Evening Snack:   DASH/TLC Sodium & Cholesterol Restricted (10-07-21 @ 02:46)      LABS:                        8.6    6.70  )-----------( 245      ( 10 Oct 2021 08:56 )             28.4     10 Oct 2021 08:57    138    |  106    |  44     ----------------------------<  130    3.7     |  25     |  3.20     Ca    8.4        10 Oct 2021 08:57    TPro  7.0    /  Alb  2.9    /  TBili  0.3    /  DBili  x      /  AST  14     /  ALT  22     /  AlkPhos  37     10 Oct 2021 08:57        Culture Results:   No growth to date. (10-07 @ 03:57)  Culture Results:   No growth to date. (10-07 @ 03:57)  Culture Results:   <10,000 CFU/mL Normal Urogenital Johnna (10-07 @ 02:42)      CARDIAC MARKERS ( 07 Oct 2021 14:20 )  .112 ng/mL / x     / x     / x     / x      CARDIAC MARKERS ( 07 Oct 2021 04:23 )  .145 ng/mL / x     / 106 U/L / x     / 2.2 ng/mL  CARDIAC MARKERS ( 06 Oct 2021 21:51 )  .149 ng/mL / x     / x     / x     / x            Culture - Blood (collected 07 Oct 2021 03:57)  Source: .Blood Blood-Peripheral  Preliminary Report (08 Oct 2021 04:00):    No growth to date.    Culture - Blood (collected 07 Oct 2021 03:57)  Source: .Blood Blood-Peripheral  Preliminary Report (08 Oct 2021 04:00):    No growth to date.    Culture - Urine (collected 07 Oct 2021 02:42)  Source: Clean Catch Clean Catch (Midstream)  Final Report (08 Oct 2021 12:49):    <10,000 CFU/mL Normal Urogenital Johnna       Anemia Panel:  Iron Total, Serum: 28 ug/dL (10-08-21 @ 22:27)  Iron - Total Binding Capacity.: 337 ug/dL (10-08-21 @ 22:27)  Vitamin B12, Serum: 413 pg/mL (10-08-21 @ 22:26)  Folate, Serum: 9.9 ng/mL (10-08-21 @ 22:26)  Ferritin, Serum: 28 ng/mL (10-08-21 @ 22:26)  Absolute Reticulocytes: 49.4 K/uL (10-08-21 @ 16:57)      Thyroid Panel:  Thyroid Stimulating Hormone, Serum: 1.35 uIU/mL (10-07-21 @ 04:23)            Serum Pro-Brain Natriuretic Peptide: 7438 pg/mL (10-07-21 @ 04:23)  Serum Pro-Brain Natriuretic Peptide: 8061 pg/mL (10-06-21 @ 21:49)      RADIOLOGY & ADDITIONAL TESTS:      HEALTH ISSUES - PROBLEM Dx:  PNA (pneumonia)    Elevated troponin    Chronic atrial fibrillation    Stage 3 chronic kidney disease    Chronic diastolic congestive heart failure    CAD (coronary artery disease)    DVT prophylaxis    Anemia of chronic disease    DM (diabetes mellitus), type 2    Depression, major    Abnormal CT scan of lung    Hypertensive urgency      Consultant(s) Notes Reviewed:  [ x ] YES     Care Discussed with [ x ] Consultants, [ x ] Patient, [  ] Family, [  ] HCP, [  ] , [  ] Social Service, [x  ] RN, [  ] Physical Therapy, [  ] Palliative Care Team  DVT PPX: [  ] Lovenox, [ x ] SC Heparin, [  ] Coumadin, [  ] Xarelto, [  ] Eliquis, [  ] Pradaxa, [  ] IV Heparin drip, [  ] SCD, [  ] Ambulation, [  ] Contraindicated 2/2 GI Bleed, [  ] Contraindicated 2/2  Bleed, [  ] Contraindicated 2/2 Brain Bleed  Advanced Directive: [ x ] None, [  ] DNR/DNI

## 2021-10-10 NOTE — PROGRESS NOTE ADULT - SUBJECTIVE AND OBJECTIVE BOX
Subjective: no complaints.       MEDICATIONS  (STANDING):  amLODIPine   Tablet 10 milliGRAM(s) Oral daily  apixaban 5 milliGRAM(s) Oral every 12 hours  ARIPiprazole 30 milliGRAM(s) Oral daily  aspirin enteric coated 81 milliGRAM(s) Oral daily  azithromycin   Tablet 500 milliGRAM(s) Oral daily  budesonide 160 MICROgram(s)/formoterol 4.5 MICROgram(s) Inhaler 2 Puff(s) Inhalation two times a day  cloNIDine 0.2 milliGRAM(s) Oral two times a day  cyanocobalamin 1000 MICROGram(s) Oral daily  dextrose 40% Gel 15 Gram(s) Oral once  dextrose 5%. 1000 milliLiter(s) (50 mL/Hr) IV Continuous <Continuous>  dextrose 50% Injectable 25 Gram(s) IV Push once  doxazosin 4 milliGRAM(s) Oral <User Schedule>  famotidine    Tablet 20 milliGRAM(s) Oral every 48 hours  folic acid 1 milliGRAM(s) Oral daily  glucagon  Injectable 1 milliGRAM(s) IntraMuscular once  hydrALAZINE 100 milliGRAM(s) Oral every 8 hours  insulin lispro (ADMELOG) corrective regimen sliding scale   SubCutaneous three times a day before meals  iron sucrose Injectable 100 milliGRAM(s) IV Push every 24 hours  lamoTRIgine 100 milliGRAM(s) Oral daily  mirtazapine 30 milliGRAM(s) Oral at bedtime  nystatin Powder 1 Application(s) Topical two times a day  oxybutynin 5 milliGRAM(s) Oral four times a day  pantoprazole    Tablet 40 milliGRAM(s) Oral before breakfast  piperacillin/tazobactam IVPB.. 3.375 Gram(s) IV Intermittent every 12 hours  venlafaxine XR. 150 milliGRAM(s) Oral daily    MEDICATIONS  (PRN):  acetaminophen   Tablet .. 650 milliGRAM(s) Oral every 6 hours PRN Temp greater or equal to 38C (100.4F), Mild Pain (1 - 3)  melatonin 3 milliGRAM(s) Oral at bedtime PRN Insomnia  ondansetron Injectable 4 milliGRAM(s) IV Push every 8 hours PRN Nausea and/or Vomiting  sodium chloride 0.65% Nasal 1 Spray(s) Both Nostrils two times a day PRN Nasal Congestion          T(C): 36.4 (10-10-21 @ 04:15), Max: 36.6 (10-09-21 @ 18:08)  HR: 65 (10-10-21 @ 04:15) (60 - 65)  BP: 170/76 (10-10-21 @ 04:15) (135/76 - 170/76)  RR: 18 (10-10-21 @ 04:15) (18 - 18)  SpO2: 98% (10-10-21 @ 04:15) (95% - 98%)  Wt(kg): --        I&O's Detail    09 Oct 2021 07:01  -  10 Oct 2021 07:00  --------------------------------------------------------  IN:    IV PiggyBack: 75 mL    Oral Fluid: 480 mL  Total IN: 555 mL    OUT:    Voided (mL): 200 mL  Total OUT: 200 mL    Total NET: 355 mL               PHYSICAL EXAM:    GENERAL: NAD  NECK: Supple, no inc in JVP  CHEST/LUNG: Clear  HEART: S1S2  ABDOMEN: Soft  EXTREMITIES:  mild edema        LABS:  CBC Full  -  ( 10 Oct 2021 08:56 )  WBC Count : 6.70 K/uL  RBC Count : 3.35 M/uL  Hemoglobin : 8.6 g/dL  Hematocrit : 28.4 %  Platelet Count - Automated : 245 K/uL  Mean Cell Volume : 84.8 fl  Mean Cell Hemoglobin : 25.7 pg  Mean Cell Hemoglobin Concentration : 30.3 gm/dL  Auto Neutrophil # : 5.15 K/uL  Auto Lymphocyte # : 0.86 K/uL  Auto Monocyte # : 0.49 K/uL  Auto Eosinophil # : 0.16 K/uL  Auto Basophil # : 0.02 K/uL  Auto Neutrophil % : 76.9 %  Auto Lymphocyte % : 12.8 %  Auto Monocyte % : 7.3 %  Auto Eosinophil % : 2.4 %  Auto Basophil % : 0.3 %    10-10    138  |  106  |  44<H>  ----------------------------<  130<H>  3.7   |  25  |  3.20<H>    Ca    8.4<L>      10 Oct 2021 08:57  Phos  6.0     10-09  Mg     2.5     10-09    TPro  7.0  /  Alb  2.9<L>  /  TBili  0.3  /  DBili  x   /  AST  14<L>  /  ALT  22  /  AlkPhos  37<L>  10-10          Impression:  * ARIELLA -- DDx: pre-renal, post-renal, hemodynamic ATN due to too rapid BP normalization. Cant r/o LN  * CKD4. Cr 1.6-2 baseline  * Multifocal PNA  * SLE    Recommendations:   * Cont to hold diuretic  * Would dc fibric acid  * Relax anti-hypertensives. Keeps -160  * Follow complements, anti-DS DNA  * Cont Zosyn q12h  * BMP in am.

## 2021-10-10 NOTE — DIETITIAN INITIAL EVALUATION ADULT. - PROBLEM SELECTOR PLAN 5
Acute ARIELLA on CKD  - Baseline creatinine Cr 1.6 to 2.6  - On admission unknown 2.0-2.3   - Monitor BMP  - Avoid nephrotoxic medications  - Monitor renal indices  - Consider nephrology consult for worsening renal function

## 2021-10-10 NOTE — PROGRESS NOTE ADULT - ASSESSMENT
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 Patient admitted for hypertensive urgency, Afib and PNA.

## 2021-10-10 NOTE — PROGRESS NOTE ADULT - PROBLEM SELECTOR PLAN 1
Multilobar PNA  - On admission does not meet sepsis criteria; afebrile, WBC WNL, normal lactate   - RIVP negative on admission   - CT Chest showed branching nodular opacities and patchy airspace opacities noted in the lingula and bilateral lower lobes which are likely infectious in etiology. No pleural effusions are present.   - s/p 1g Rocephin and Azithromycin   - Will continue renally dosed Zosyn  q 12 hrs-Renal Dose Abx with azithromycin 500 mg qd per ID Dr. Garner  - BCx NGTD,   - f/u legionella strep urine antigens  - F/up procalcitonin --> elevated 0.14  CBC in AM Multilobar PNA  - On admission does not meet sepsis criteria; afebrile, WBC WNL, normal lactate   - RIVP negative on admission   - CT Chest showed branching nodular opacities and patchy airspace opacities noted in the lingula and bilateral lower lobes which are likely infectious in etiology. No pleural effusions are present.   - s/p 1g Rocephin and Azithromycin 500 mg daily x 2 days  - Will continue renally dosed Zosyn  q 12 hrs-Renal Dose Abx with azithromycin 500 mg qd per ID Dr. Garner  - BCx NGTD,   - f/u legionella strep urine antigens  - F/up procalcitonin --> elevated 0.14  CBC in AM

## 2021-10-10 NOTE — DIETITIAN INITIAL EVALUATION ADULT. - PERSON TAUGHT/METHOD
HF/nutrition/verbal instruction/written material/patient instructed/teach back - (Patient repeats in own words)

## 2021-10-10 NOTE — DIETITIAN INITIAL EVALUATION ADULT. - PERTINENT LABORATORY DATA
10/9 H/H 8.3/27.3  BUN/Crea 51/3.5  Glu 139   Ca 8.1 (corrected to 9.94)  Phos 6.0  POCT gluc 10/9 148, 117, 129, 146  10/8 A1c 6.3 (average gluc 134)

## 2021-10-10 NOTE — PROGRESS NOTE ADULT - PROBLEM SELECTOR PLAN 6
H/H 10.1/32 on admission, baseline hemoglobin 8.0-9.0  - Currently hemodynamically stable, likely related to CKD   - Iron studies on 07/21 demonstrated iron deficiency anemia   - F/u AM CBC, Follow w /up R/O -Plasma cell disorder  -D/W Dr Rob

## 2021-10-10 NOTE — PROGRESS NOTE ADULT - PROBLEM SELECTOR PLAN 12
Need for prophylaxis measures   DVT ppx: improve score 1. Continue home Eliquis 5mg BID   IMPROVE VTE Individual Risk Assessment  RISK                                                                Points  [  ] Previous VTE                                                  3  [  ] Thrombophilia                                               2  [  ] Lower limb paralysis                                      2        (unable to hold up >15 seconds)    [  ] Current Cancer                                              2         (within 6 months)  [  ] Immobilization > 24 hrs                                1  [  ] ICU/CCU stay > 24 hours                              1  [x  ] Age > 60                                                      1  IMPROVE VTE Score _______1__.

## 2021-10-10 NOTE — PROGRESS NOTE ADULT - PROBLEM SELECTOR PLAN 5
Acute ARIELLA on CKD 3-4  - improving reanal fucntion this am;  3.2<-- 3.5 <-- 3.1 <--2.0 <--2.30 <--1.60  - HOLD Home Lasix   - Avoid nephrotoxic medications  - BMP in AM   - Dr. Cayden brown following, recs appreciated - check bladder scan, c3 c4 complement and dsdna  -As per Renal increase Cr is likely 2/2 tight control of BP- Will STOP BB, Lower dose of Clonidine 2/2 also Bradycardia

## 2021-10-10 NOTE — PROGRESS NOTE ADULT - SUBJECTIVE AND OBJECTIVE BOX
Patient seen and examined;  Chart reviewed and events noted;       MEDICATIONS  (STANDING):  amLODIPine   Tablet 10 milliGRAM(s) Oral daily  apixaban 5 milliGRAM(s) Oral every 12 hours  ARIPiprazole 30 milliGRAM(s) Oral daily  aspirin enteric coated 81 milliGRAM(s) Oral daily  azithromycin   Tablet 500 milliGRAM(s) Oral daily  budesonide 160 MICROgram(s)/formoterol 4.5 MICROgram(s) Inhaler 2 Puff(s) Inhalation two times a day  cloNIDine 0.2 milliGRAM(s) Oral two times a day  cyanocobalamin 1000 MICROGram(s) Oral daily  dextrose 40% Gel 15 Gram(s) Oral once  dextrose 5%. 1000 milliLiter(s) (50 mL/Hr) IV Continuous <Continuous>  dextrose 50% Injectable 25 Gram(s) IV Push once  doxazosin 4 milliGRAM(s) Oral <User Schedule>  famotidine    Tablet 20 milliGRAM(s) Oral every 48 hours  folic acid 1 milliGRAM(s) Oral daily  glucagon  Injectable 1 milliGRAM(s) IntraMuscular once  hydrALAZINE 100 milliGRAM(s) Oral every 8 hours  insulin lispro (ADMELOG) corrective regimen sliding scale   SubCutaneous three times a day before meals  iron sucrose Injectable 100 milliGRAM(s) IV Push every 24 hours  lamoTRIgine 100 milliGRAM(s) Oral daily  mirtazapine 30 milliGRAM(s) Oral at bedtime  nystatin Powder 1 Application(s) Topical two times a day  oxybutynin 5 milliGRAM(s) Oral four times a day  pantoprazole    Tablet 40 milliGRAM(s) Oral before breakfast  piperacillin/tazobactam IVPB.. 3.375 Gram(s) IV Intermittent every 12 hours  venlafaxine XR. 150 milliGRAM(s) Oral daily    MEDICATIONS  (PRN):  acetaminophen   Tablet .. 650 milliGRAM(s) Oral every 6 hours PRN Temp greater or equal to 38C (100.4F), Mild Pain (1 - 3)  melatonin 3 milliGRAM(s) Oral at bedtime PRN Insomnia  ondansetron Injectable 4 milliGRAM(s) IV Push every 8 hours PRN Nausea and/or Vomiting  sodium chloride 0.65% Nasal 1 Spray(s) Both Nostrils two times a day PRN Nasal Congestion      Vital Signs Last 24 Hrs  T(C): 36.4 (10 Oct 2021 04:15), Max: 36.7 (09 Oct 2021 11:25)  T(F): 97.5 (10 Oct 2021 04:15), Max: 98 (09 Oct 2021 11:25)  HR: 65 (10 Oct 2021 04:15) (60 - 65)  BP: 170/76 (10 Oct 2021 04:15) (135/76 - 170/76)  BP(mean): --  RR: 18 (10 Oct 2021 04:15) (18 - 18)  SpO2: 98% (10 Oct 2021 04:15) (95% - 98%)    PHYSICAL EXAM  General: adult in NAD  HEENT: clear oropharynx, anicteric sclera, pink conjunctivae  Neck: supple  CV: normal S1S2 with no murmur rubs or gallops  Lungs: clear to auscultation, no wheezes, no rhales  Abdomen: soft non-tender non-distended, no hepato/splenomegaly  Ext: no clubbing cyanosis or edema  Skin: no rashes and no petichiae  Neuro: alert and oriented X3 no focal deficits      LABS:                        8.6    6.70  )-----------( 245      ( 10 Oct 2021 08:56 )             28.4     Hemoglobin: 8.6 g/dL (10-10 @ 08:56)  Hemoglobin: 8.3 g/dL (10-09 @ 07:10)  Hemoglobin: 8.7 g/dL (10-08 @ 07:58)  Hemoglobin: 9.8 g/dL (10-07 @ 04:23)  Hemoglobin: 10.1 g/dL (10-06 @ 21:49)    10-10    138  |  106  |  44<H>  ----------------------------<  130<H>  3.7   |  25  |  3.20<H>    Ca    8.4<L>      10 Oct 2021 08:57  Phos  6.0     10-09  Mg     2.5     10-09    TPro  7.0  /  Alb  2.9<L>  /  TBili  0.3  /  DBili  x   /  AST  14<L>  /  ALT  22  /  AlkPhos  37<L>  10-10    B12 - 413  Folate - 9.9    Iron 28, TIBC 337, Iron sat 8%, ferritin 28        SPEP/Immunofixation/immunoglobulin panel pending

## 2021-10-10 NOTE — PROGRESS NOTE ADULT - ASSESSMENT
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 Patient admitted for hypertensive urgency, Afib and PNA.     HEME Onc eval noted - H and H reviewed - Labs reviewed -   clonidine added    pna eval   ID eval noted  biomarkers - cx -   on emp ABX  TTE and CT chest reviewed  LABS reviewed  pt with CKD - Anemia -   monitor VS and Sat - wean o2 as tolerated - keep sat > 88 pct  [facility] rx regimen and BP control - pt with known HTN - AF - CAD -   DM care - serial FS -

## 2021-10-10 NOTE — DIETITIAN INITIAL EVALUATION ADULT. - OTHER INFO
75 y/o male adm with pneumonia, afib, mild HF. PMH diverticulosis, diverticulitis, anxiety, type 2 DM, CAD (s/p stents), afib, HLD, HTN, lupus. Pt visited at bedside this am. Pt is from a Group Home. Usually has a good appetite, eats well. Doesn't watch any dietary restrictions. -215#. Pt unsure if current wt gain is due to fluid or having a good appetite. No edema noted. Provided pt with verbal and written information re: HF/nutrition. Will monitor kidney function and need for renal restriction prior to pt's d/c. A1c 6.3.

## 2021-10-10 NOTE — PROGRESS NOTE ADULT - SUBJECTIVE AND OBJECTIVE BOX
Date/Time Patient Seen:  		  Referring MD:   Data Reviewed	       Patient is a 76y old  Male who presents with a chief complaint of multilobar PNA, afib (09 Oct 2021 15:06)      Subjective/HPI     PAST MEDICAL & SURGICAL HISTORY:  Hypertension    Diabetes mellitus    Lupus    Hepatitis C    Anxiety and depression    CAD (coronary artery disease)  s/p stents    Diverticulosis    Hyperlipidemia    HTN (hypertension)  c/b multiple episodes of hypertensive urgency    HLD (hyperlipidemia)    Atrial fibrillation  first documented on EKG 10/7/2021    CAD (coronary artery disease)  s/p stents (not on plavix)    Type 2 diabetes mellitus  not on home insulin    Anxiety  multiple psych medications    History of diverticulitis  07/2021    Diverticulosis  c/b GIB in 2020    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  Had surgery ( April 2013 )    S/P tonsillectomy    S/P arthroscopic knee surgery  Bilateral ( 2005 )    Torsion of testicle  Had surgery at age 14    Pilonidal cyst  Had surgery ( 1969 )    S/P cataract surgery  Bilateral    H/O hernia repair          Medication list         MEDICATIONS  (STANDING):  amLODIPine   Tablet 10 milliGRAM(s) Oral daily  apixaban 5 milliGRAM(s) Oral every 12 hours  ARIPiprazole 30 milliGRAM(s) Oral daily  aspirin enteric coated 81 milliGRAM(s) Oral daily  budesonide 160 MICROgram(s)/formoterol 4.5 MICROgram(s) Inhaler 2 Puff(s) Inhalation two times a day  cloNIDine 0.2 milliGRAM(s) Oral two times a day  cyanocobalamin 1000 MICROGram(s) Oral daily  dextrose 40% Gel 15 Gram(s) Oral once  dextrose 5%. 1000 milliLiter(s) (50 mL/Hr) IV Continuous <Continuous>  dextrose 50% Injectable 25 Gram(s) IV Push once  doxazosin 4 milliGRAM(s) Oral <User Schedule>  famotidine    Tablet 20 milliGRAM(s) Oral every 48 hours  folic acid 1 milliGRAM(s) Oral daily  glucagon  Injectable 1 milliGRAM(s) IntraMuscular once  hydrALAZINE 100 milliGRAM(s) Oral every 8 hours  insulin lispro (ADMELOG) corrective regimen sliding scale   SubCutaneous three times a day before meals  iron sucrose Injectable 100 milliGRAM(s) IV Push every 24 hours  lamoTRIgine 100 milliGRAM(s) Oral daily  mirtazapine 30 milliGRAM(s) Oral at bedtime  nystatin Powder 1 Application(s) Topical two times a day  oxybutynin 5 milliGRAM(s) Oral four times a day  pantoprazole    Tablet 40 milliGRAM(s) Oral before breakfast  piperacillin/tazobactam IVPB.. 3.375 Gram(s) IV Intermittent every 12 hours  venlafaxine XR. 150 milliGRAM(s) Oral daily    MEDICATIONS  (PRN):  acetaminophen   Tablet .. 650 milliGRAM(s) Oral every 6 hours PRN Temp greater or equal to 38C (100.4F), Mild Pain (1 - 3)  melatonin 3 milliGRAM(s) Oral at bedtime PRN Insomnia  ondansetron Injectable 4 milliGRAM(s) IV Push every 8 hours PRN Nausea and/or Vomiting  sodium chloride 0.65% Nasal 1 Spray(s) Both Nostrils two times a day PRN Nasal Congestion         Vitals log        ICU Vital Signs Last 24 Hrs  T(C): 36.4 (10 Oct 2021 04:15), Max: 36.7 (09 Oct 2021 11:25)  T(F): 97.5 (10 Oct 2021 04:15), Max: 98 (09 Oct 2021 11:25)  HR: 65 (10 Oct 2021 04:15) (60 - 65)  BP: 170/76 (10 Oct 2021 04:15) (135/76 - 170/76)  BP(mean): --  ABP: --  ABP(mean): --  RR: 18 (10 Oct 2021 04:15) (18 - 18)  SpO2: 98% (10 Oct 2021 04:15) (95% - 98%)           Input and Output:  I&O's Detail    09 Oct 2021 07:01  -  10 Oct 2021 06:03  --------------------------------------------------------  IN:    IV PiggyBack: 50 mL    Oral Fluid: 240 mL  Total IN: 290 mL    OUT:    Voided (mL): 200 mL  Total OUT: 200 mL    Total NET: 90 mL          Lab Data                        8.3    6.07  )-----------( 224      ( 09 Oct 2021 07:10 )             27.3     10-09    137  |  105  |  51<H>  ----------------------------<  139<H>  3.7   |  23  |  3.50<H>    Ca    8.1<L>      09 Oct 2021 07:10  Phos  6.0     10-09  Mg     2.5     10-09    TPro  6.4  /  Alb  2.7<L>  /  TBili  0.4  /  DBili  x   /  AST  13<L>  /  ALT  21  /  AlkPhos  35<L>  10-09            Review of Systems	      Objective     Physical Examination    heart s1s2  lung dec BS  abd soft      Pertinent Lab findings & Imaging      Nikko:  NO   Adequate UO     I&O's Detail    09 Oct 2021 07:01  -  10 Oct 2021 06:03  --------------------------------------------------------  IN:    IV PiggyBack: 50 mL    Oral Fluid: 240 mL  Total IN: 290 mL    OUT:    Voided (mL): 200 mL  Total OUT: 200 mL    Total NET: 90 mL               Discussed with:     Cultures:	        Radiology

## 2021-10-11 LAB
ALBUMIN SERPL ELPH-MCNC: 2.7 G/DL — LOW (ref 3.3–5)
ALP SERPL-CCNC: 36 U/L — LOW (ref 40–120)
ALT FLD-CCNC: 19 U/L — SIGNIFICANT CHANGE UP (ref 12–78)
ANION GAP SERPL CALC-SCNC: 7 MMOL/L — SIGNIFICANT CHANGE UP (ref 5–17)
AST SERPL-CCNC: 13 U/L — LOW (ref 15–37)
BILIRUB SERPL-MCNC: 0.3 MG/DL — SIGNIFICANT CHANGE UP (ref 0.2–1.2)
BUN SERPL-MCNC: 42 MG/DL — HIGH (ref 7–23)
CALCIUM SERPL-MCNC: 8.6 MG/DL — SIGNIFICANT CHANGE UP (ref 8.5–10.1)
CHLORIDE SERPL-SCNC: 109 MMOL/L — HIGH (ref 96–108)
CO2 SERPL-SCNC: 23 MMOL/L — SIGNIFICANT CHANGE UP (ref 22–31)
CREAT SERPL-MCNC: 2.9 MG/DL — HIGH (ref 0.5–1.3)
GLUCOSE SERPL-MCNC: 118 MG/DL — HIGH (ref 70–99)
HCT VFR BLD CALC: 28.8 % — LOW (ref 39–50)
HGB BLD-MCNC: 8.7 G/DL — LOW (ref 13–17)
IGA FLD-MCNC: 110 MG/DL — SIGNIFICANT CHANGE UP (ref 84–499)
IGG FLD-MCNC: 776 MG/DL — SIGNIFICANT CHANGE UP (ref 610–1660)
IGM SERPL-MCNC: 42 MG/DL — SIGNIFICANT CHANGE UP (ref 35–242)
KAPPA LC SER QL IFE: 8.25 MG/DL — HIGH (ref 0.33–1.94)
KAPPA LC SER QL IFE: 8.25 MG/DL — HIGH (ref 0.33–1.94)
KAPPA/LAMBDA FREE LIGHT CHAIN RATIO, SERUM: 1.66 RATIO — HIGH (ref 0.26–1.65)
KAPPA/LAMBDA FREE LIGHT CHAIN RATIO, SERUM: 1.66 RATIO — HIGH (ref 0.26–1.65)
LAMBDA LC SER QL IFE: 4.98 MG/DL — HIGH (ref 0.57–2.63)
LAMBDA LC SER QL IFE: 4.98 MG/DL — HIGH (ref 0.57–2.63)
MCHC RBC-ENTMCNC: 26 PG — LOW (ref 27–34)
MCHC RBC-ENTMCNC: 30.2 GM/DL — LOW (ref 32–36)
MCV RBC AUTO: 86.2 FL — SIGNIFICANT CHANGE UP (ref 80–100)
NRBC # BLD: 0 /100 WBCS — SIGNIFICANT CHANGE UP (ref 0–0)
PLATELET # BLD AUTO: 250 K/UL — SIGNIFICANT CHANGE UP (ref 150–400)
POTASSIUM SERPL-MCNC: 3.5 MMOL/L — SIGNIFICANT CHANGE UP (ref 3.5–5.3)
POTASSIUM SERPL-SCNC: 3.5 MMOL/L — SIGNIFICANT CHANGE UP (ref 3.5–5.3)
PROT SERPL-MCNC: 6.8 G/DL — SIGNIFICANT CHANGE UP (ref 6–8.3)
RBC # BLD: 3.34 M/UL — LOW (ref 4.2–5.8)
RBC # FLD: 17.4 % — HIGH (ref 10.3–14.5)
SODIUM SERPL-SCNC: 139 MMOL/L — SIGNIFICANT CHANGE UP (ref 135–145)
WBC # BLD: 7.04 K/UL — SIGNIFICANT CHANGE UP (ref 3.8–10.5)
WBC # FLD AUTO: 7.04 K/UL — SIGNIFICANT CHANGE UP (ref 3.8–10.5)

## 2021-10-11 PROCEDURE — 99232 SBSQ HOSP IP/OBS MODERATE 35: CPT

## 2021-10-11 RX ADMIN — PANTOPRAZOLE SODIUM 40 MILLIGRAM(S): 20 TABLET, DELAYED RELEASE ORAL at 05:14

## 2021-10-11 RX ADMIN — Medication 650 MILLIGRAM(S): at 23:48

## 2021-10-11 RX ADMIN — Medication 5 MILLIGRAM(S): at 12:15

## 2021-10-11 RX ADMIN — Medication 1 MILLIGRAM(S): at 12:14

## 2021-10-11 RX ADMIN — Medication 100 MILLIGRAM(S): at 21:19

## 2021-10-11 RX ADMIN — MIRTAZAPINE 30 MILLIGRAM(S): 45 TABLET, ORALLY DISINTEGRATING ORAL at 21:20

## 2021-10-11 RX ADMIN — IRON SUCROSE 100 MILLIGRAM(S): 20 INJECTION, SOLUTION INTRAVENOUS at 17:41

## 2021-10-11 RX ADMIN — APIXABAN 5 MILLIGRAM(S): 2.5 TABLET, FILM COATED ORAL at 05:17

## 2021-10-11 RX ADMIN — Medication 81 MILLIGRAM(S): at 12:14

## 2021-10-11 RX ADMIN — Medication 650 MILLIGRAM(S): at 16:25

## 2021-10-11 RX ADMIN — Medication 4 MILLIGRAM(S): at 08:29

## 2021-10-11 RX ADMIN — NYSTATIN CREAM 1 APPLICATION(S): 100000 CREAM TOPICAL at 05:15

## 2021-10-11 RX ADMIN — Medication 0.2 MILLIGRAM(S): at 17:41

## 2021-10-11 RX ADMIN — Medication 150 MILLIGRAM(S): at 12:14

## 2021-10-11 RX ADMIN — Medication 5 MILLIGRAM(S): at 05:14

## 2021-10-11 RX ADMIN — BUDESONIDE AND FORMOTEROL FUMARATE DIHYDRATE 2 PUFF(S): 160; 4.5 AEROSOL RESPIRATORY (INHALATION) at 05:15

## 2021-10-11 RX ADMIN — Medication 100 MILLIGRAM(S): at 13:01

## 2021-10-11 RX ADMIN — NYSTATIN CREAM 1 APPLICATION(S): 100000 CREAM TOPICAL at 17:41

## 2021-10-11 RX ADMIN — Medication 5 MILLIGRAM(S): at 23:48

## 2021-10-11 RX ADMIN — Medication 100 MILLIGRAM(S): at 05:14

## 2021-10-11 RX ADMIN — LAMOTRIGINE 100 MILLIGRAM(S): 25 TABLET, ORALLY DISINTEGRATING ORAL at 12:15

## 2021-10-11 RX ADMIN — AMLODIPINE BESYLATE 10 MILLIGRAM(S): 2.5 TABLET ORAL at 05:14

## 2021-10-11 RX ADMIN — PIPERACILLIN AND TAZOBACTAM 25 GRAM(S): 4; .5 INJECTION, POWDER, LYOPHILIZED, FOR SOLUTION INTRAVENOUS at 05:13

## 2021-10-11 RX ADMIN — AZITHROMYCIN 500 MILLIGRAM(S): 500 TABLET, FILM COATED ORAL at 12:15

## 2021-10-11 RX ADMIN — APIXABAN 5 MILLIGRAM(S): 2.5 TABLET, FILM COATED ORAL at 17:41

## 2021-10-11 RX ADMIN — ARIPIPRAZOLE 30 MILLIGRAM(S): 15 TABLET ORAL at 12:14

## 2021-10-11 RX ADMIN — PREGABALIN 1000 MICROGRAM(S): 225 CAPSULE ORAL at 12:14

## 2021-10-11 RX ADMIN — BUDESONIDE AND FORMOTEROL FUMARATE DIHYDRATE 2 PUFF(S): 160; 4.5 AEROSOL RESPIRATORY (INHALATION) at 20:00

## 2021-10-11 RX ADMIN — Medication 0.2 MILLIGRAM(S): at 05:14

## 2021-10-11 RX ADMIN — PIPERACILLIN AND TAZOBACTAM 25 GRAM(S): 4; .5 INJECTION, POWDER, LYOPHILIZED, FOR SOLUTION INTRAVENOUS at 17:40

## 2021-10-11 RX ADMIN — Medication 5 MILLIGRAM(S): at 17:41

## 2021-10-11 RX ADMIN — Medication 650 MILLIGRAM(S): at 16:28

## 2021-10-11 RX ADMIN — Medication 4 MILLIGRAM(S): at 21:20

## 2021-10-11 RX ADMIN — FAMOTIDINE 20 MILLIGRAM(S): 10 INJECTION INTRAVENOUS at 05:14

## 2021-10-11 NOTE — PROGRESS NOTE ADULT - SUBJECTIVE AND OBJECTIVE BOX
Patient is a 76y old  Male who presents with a chief complaint of multilobar PNA, afib (11 Oct 2021 14:23)    Patient seen in follow up for CKD 4.        PAST MEDICAL HISTORY:  Hypertension    Diabetes mellitus    Lupus    Hepatitis C    Anxiety and depression    CAD (coronary artery disease)    Diverticulosis    Hyperlipidemia    HTN (hypertension)    HLD (hyperlipidemia)    Atrial fibrillation    CAD (coronary artery disease)    Type 2 diabetes mellitus    Anxiety    History of diverticulitis    Diverticulosis      MEDICATIONS  (STANDING):  amLODIPine   Tablet 10 milliGRAM(s) Oral daily  apixaban 5 milliGRAM(s) Oral every 12 hours  ARIPiprazole 30 milliGRAM(s) Oral daily  aspirin enteric coated 81 milliGRAM(s) Oral daily  azithromycin   Tablet 500 milliGRAM(s) Oral daily  budesonide 160 MICROgram(s)/formoterol 4.5 MICROgram(s) Inhaler 2 Puff(s) Inhalation two times a day  cloNIDine 0.2 milliGRAM(s) Oral two times a day  cyanocobalamin 1000 MICROGram(s) Oral daily  dextrose 40% Gel 15 Gram(s) Oral once  dextrose 5%. 1000 milliLiter(s) (50 mL/Hr) IV Continuous <Continuous>  dextrose 50% Injectable 25 Gram(s) IV Push once  doxazosin 4 milliGRAM(s) Oral <User Schedule>  famotidine    Tablet 20 milliGRAM(s) Oral every 48 hours  folic acid 1 milliGRAM(s) Oral daily  glucagon  Injectable 1 milliGRAM(s) IntraMuscular once  hydrALAZINE 100 milliGRAM(s) Oral every 8 hours  insulin lispro (ADMELOG) corrective regimen sliding scale   SubCutaneous three times a day before meals  iron sucrose Injectable 100 milliGRAM(s) IV Push every 24 hours  lamoTRIgine 100 milliGRAM(s) Oral daily  mirtazapine 30 milliGRAM(s) Oral at bedtime  nystatin Powder 1 Application(s) Topical two times a day  oxybutynin 5 milliGRAM(s) Oral four times a day  pantoprazole    Tablet 40 milliGRAM(s) Oral before breakfast  piperacillin/tazobactam IVPB.. 3.375 Gram(s) IV Intermittent every 12 hours  venlafaxine XR. 150 milliGRAM(s) Oral daily    MEDICATIONS  (PRN):  acetaminophen   Tablet .. 650 milliGRAM(s) Oral every 6 hours PRN Temp greater or equal to 38C (100.4F), Mild Pain (1 - 3)  melatonin 3 milliGRAM(s) Oral at bedtime PRN Insomnia  ondansetron Injectable 4 milliGRAM(s) IV Push every 8 hours PRN Nausea and/or Vomiting  sodium chloride 0.65% Nasal 1 Spray(s) Both Nostrils two times a day PRN Nasal Congestion    T(C): 36.7 (10-11-21 @ 11:59), Max: 36.7 (10-11-21 @ 11:59)  HR: 75 (10-11-21 @ 11:59) (58 - 75)  BP: 143/67 (10-11-21 @ 11:59) (131/69 - 170/76)  RR: 18 (10-11-21 @ 11:59) (16 - 18)  SpO2: 89% (10-11-21 @ 11:59) (89% - 98%)  Wt(kg): --  I&O's Detail      PHYSICAL EXAM:  General: No distress  Respiratory: b/l air entry  Cardiovascular: S1 S2  Gastrointestinal: soft  Extremities:  + edema                              8.7    7.04  )-----------( 250      ( 11 Oct 2021 06:48 )             28.8     10-11    139  |  109<H>  |  42<H>  ----------------------------<  118<H>  3.5   |  23  |  2.90<H>    Ca    8.6      11 Oct 2021 06:48    TPro  6.8  /  Alb  2.7<L>  /  TBili  0.3  /  DBili  x   /  AST  13<L>  /  ALT  19  /  AlkPhos  36<L>  10-11        LIVER FUNCTIONS - ( 11 Oct 2021 06:48 )  Alb: 2.7 g/dL / Pro: 6.8 g/dL / ALK PHOS: 36 U/L / ALT: 19 U/L / AST: 13 U/L / GGT: x               Sodium, Serum: 139 (10-11 @ 06:48)  Sodium, Serum: 138 (10-10 @ 08:57)  Sodium, Serum: 137 (10-09 @ 07:10)  Sodium, Serum: 141 (10-08 @ 07:58)    Creatinine, Serum: 2.90 (10-11 @ 06:48)  Creatinine, Serum: 3.20 (10-10 @ 08:57)  Creatinine, Serum: 3.50 (10-09 @ 07:10)  Creatinine, Serum: 3.10 (10-08 @ 07:58)    Potassium, Serum: 3.5 (10-11 @ 06:48)  Potassium, Serum: 3.7 (10-10 @ 08:57)  Potassium, Serum: 3.7 (10-09 @ 07:10)  Potassium, Serum: 3.7 (10-08 @ 07:58)    Hemoglobin: 8.7 (10-11 @ 06:48)  Hemoglobin: 8.6 (10-10 @ 08:56)  Hemoglobin: 8.3 (10-09 @ 07:10)  Hemoglobin: 8.7 (10-08 @ 07:58)

## 2021-10-11 NOTE — PROGRESS NOTE ADULT - PROBLEM SELECTOR PLAN 5
Acute ARIELLA on CKD 3-4  - improving reanal fucntion this am;  3.2<-- 3.5 <-- 3.1 <--2.0 <--2.30 <--1.60  - HOLD Home Lasix   - Avoid nephrotoxic medications  - BMP in AM   - Dr. Cayden brown following, recs appreciated - check bladder scan, c3 c4 complement and dsdna  -As per Renal increase Cr is likely 2/2 tight control of BP- Will STOP BB, Lower dose of Clonidine 2/2 also Bradycardia Acute ARIELLA on CKD 3-4  - improving renal function this am;  3.2<-- 3.5 <-- 3.1 <--2.0 <--2.30 <--1.60  - HOLD Home Lasix   - Avoid nephrotoxic medications  - BMP in AM   - Dr. Cayden brown following, recs appreciated - check bladder scan, c3 c4 complement and dsdna  -As per Renal increase Cr is likely 2/2 tight control of BP- Will STOP BB, Lower dose of Clonidine 2/2 also Bradycardia

## 2021-10-11 NOTE — PROGRESS NOTE ADULT - PROBLEM SELECTOR PLAN 2
On admission /100 --> BP elevated but is improving 2/2 NON Compliance likely  - Of note patient recently admitted with hypertensive urgency on 07/2021  - d/c labetalol 100 mg BID in the setting of Bradycardia &  worsening renal function. as per Renal ARIELLA 2/2 tight control of BP   - On clonidine 0.2 TID now clonidine 0.2 BID in the setting of Bradycardia & worsening renal function, as per Renal ARIELLA 2/2 tight control of BP .   - will continue hydralazine 100 mg q 8 hrs + amlodipine 10 mg qd   - Will continue with home medications with hold parameters   - Continue to monitor BP On admission /100 --> BP elevated but is improving 2/2 NON Compliance likely.  - Of note patient recently admitted with hypertensive urgency on 07/2021  - d/c labetalol 100 mg BID in the setting of Bradycardia &  worsening renal function. as per Renal ARIELLA 2/2 tight control of BP   - On clonidine 0.2 TID now clonidine 0.2 BID in the setting of Bradycardia & worsening renal function, as per Renal ARIELLA 2/2 tight control of BP .   - will continue hydralazine 100 mg q 8 hrs + amlodipine 10 mg qd   - Will continue with home medications with hold parameters   - Continue to monitor BP

## 2021-10-11 NOTE — PROGRESS NOTE ADULT - SUBJECTIVE AND OBJECTIVE BOX
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 Pt states he would intermittently feel chills and feverish, recorded no temps at group home since last couple of days, he states progressively worsening of dyspnea on exertion and rest, patient unable to walk short distances or climb stairs due to dyspnea, however denies orthopnea, cough, sputum production, night sweats. Patient mentioned some dizziness associated with lower extremities weakness, usually ambulates independently but now feels unable to hold his weight. Regarding palpitations, pt states for 3 days his heart would skip a beat, return to normal. Self limited episodes under a minute, no associated chest pain. Pt also describes urinary frequency beyond baseline, denies dysuria or incontinence, hematuria, constipation.     ED course:   VS: Afebrile, HR: 80 BP: 220/100->235/116-> 189/85, Spo2: 98 on NC RR: 20  Labs significant for low stable hemoglobin, BUN/creatinine: 36/2.30, Trop 0.149, Pro BMP 8061.   EKG on admission showed Afib @ 64 bpm, incompleta right BBB, LVH  CT head shows no  evidence of acute intracranial hemorrhage, midline shift or CT evidence of acute territorial infarct.  CT chest A/P demonstrated Multilobar pneumonia. Mild cardiomegaly. No mall bowel obstruction or active bowel inflammation.  Patient was given in the ED 10 mg IVP hydralazine 10 mg Labetalol IVP 1x, 2000 cc bolus NS 1x            (07 Oct 2021 01:24)    INTERVAL HPI:      OVERNIGHT EVENTS:    Home Medications:  cloNIDine 0.1 mg oral tablet: 2 tab(s) orally 3 times a day (07 Oct 2021 03:41)  digoxin 125 mcg (0.125 mg) oral tablet: 1 tab(s) orally once a day (07 Oct 2021 03:41)  Eliquis 5 mg oral tablet: 1 tab(s) orally 2 times a day (07 Oct 2021 03:41)  famotidine 40 mg oral tablet: 1 tab(s) orally once a day (at bedtime) (07 Oct 2021 03:41)  fenofibrate 145 mg oral tablet: 1 tab(s) orally once a day (07 Oct 2021 03:41)  labetalol 100 mg oral tablet: 1 tab(s) orally 2 times a day (07 Oct 2021 03:41)  Lasix 20 mg oral tablet: 1 tab(s) orally 2 times a day (07 Oct 2021 03:41)  oxybutynin 5 mg oral tablet: 1 tab(s) orally 4 times a day (07 Oct 2021 03:41)  pantoprazole 40 mg oral delayed release tablet: 1 tab(s) orally once a day (before a meal) (07 Oct 2021 03:41)  Symbicort 160 mcg-4.5 mcg/inh inhalation aerosol: 2 puff(s) inhaled 2 times a day (07 Oct 2021 03:41)      MEDICATIONS  (STANDING):  amLODIPine   Tablet 10 milliGRAM(s) Oral daily  apixaban 5 milliGRAM(s) Oral every 12 hours  ARIPiprazole 30 milliGRAM(s) Oral daily  aspirin enteric coated 81 milliGRAM(s) Oral daily  azithromycin   Tablet 500 milliGRAM(s) Oral daily  budesonide 160 MICROgram(s)/formoterol 4.5 MICROgram(s) Inhaler 2 Puff(s) Inhalation two times a day  cloNIDine 0.2 milliGRAM(s) Oral two times a day  cyanocobalamin 1000 MICROGram(s) Oral daily  dextrose 40% Gel 15 Gram(s) Oral once  dextrose 5%. 1000 milliLiter(s) (50 mL/Hr) IV Continuous <Continuous>  dextrose 50% Injectable 25 Gram(s) IV Push once  doxazosin 4 milliGRAM(s) Oral <User Schedule>  famotidine    Tablet 20 milliGRAM(s) Oral every 48 hours  folic acid 1 milliGRAM(s) Oral daily  glucagon  Injectable 1 milliGRAM(s) IntraMuscular once  hydrALAZINE 100 milliGRAM(s) Oral every 8 hours  insulin lispro (ADMELOG) corrective regimen sliding scale   SubCutaneous three times a day before meals  iron sucrose Injectable 100 milliGRAM(s) IV Push every 24 hours  lamoTRIgine 100 milliGRAM(s) Oral daily  mirtazapine 30 milliGRAM(s) Oral at bedtime  nystatin Powder 1 Application(s) Topical two times a day  oxybutynin 5 milliGRAM(s) Oral four times a day  pantoprazole    Tablet 40 milliGRAM(s) Oral before breakfast  piperacillin/tazobactam IVPB.. 3.375 Gram(s) IV Intermittent every 12 hours  venlafaxine XR. 150 milliGRAM(s) Oral daily    MEDICATIONS  (PRN):  acetaminophen   Tablet .. 650 milliGRAM(s) Oral every 6 hours PRN Temp greater or equal to 38C (100.4F), Mild Pain (1 - 3)  melatonin 3 milliGRAM(s) Oral at bedtime PRN Insomnia  ondansetron Injectable 4 milliGRAM(s) IV Push every 8 hours PRN Nausea and/or Vomiting  sodium chloride 0.65% Nasal 1 Spray(s) Both Nostrils two times a day PRN Nasal Congestion      Allergies    No Known Allergies    Intolerances        Social History:  Tobacco: quit in 1980, not active smoker  EtOH: denies   Recreational drug use: cocaine several years ago "a few times" has not used in "many years"  Lives with: [facility]; group home; no nursing assistance; observation is there  Ambulates: independent, denies walker or cane but uses guard railings  ADLs: independent, but states need for assistance in bathing, cooking; independent with feeding, ambulating  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[place]  Vaccinations: Moderna x2 doses last dose in May (07 Oct 2021 01:24)      REVIEW OF SYSTEMS:  CONSTITUTIONAL: No fever, No chills, No fatigue, No myalgia, No Body ache, No Weakness  EYES: No eye pain,  No visual disturbances, No discharge, NO Redness  ENMT:  No ear pain, No nose bleed, No vertigo; No sinus pain, NO throat pain, No Congestion  NECK: No pain, No stiffness  RESPIRATORY: No cough, NO wheezing, No  hemoptysis, NO  shortness of breath  CARDIOVASCULAR: No chest pain, palpitations  GASTROINTESTINAL: No abdominal pain, NO epigastric pain. No nausea, No vomiting; No diarrhea, No constipation. [  ] BM  GENITOURINARY: No dysuria, No frequency, No urgency, No hematuria, NO incontinence  NEUROLOGICAL: No headaches, No dizziness, No numbness, No tingling, No tremors, No weakness  EXT: No Swelling, No Pain, No Edema  SKIN:  [  ] No itching, burning, rashes, or lesions   MUSCULOSKELETAL: No joint pain ,No Jt swelling; No muscle pain, No back pain, No extremity pain  PSYCHIATRIC: No depression,  No anxiety,  No mood swings ,No difficulty sleeping at night  PAIN SCALE: [  ] None  [  ] Other-  ROS Unable to obtain due to - [  ] Dementia  [  ] Lethargy [  ] Drowsy [  ] Sedated [  ] non verbal  REST OF REVIEW Of SYSTEM - [  ] Normal     Vital Signs Last 24 Hrs  T(C): 36.4 (11 Oct 2021 04:23), Max: 36.5 (10 Oct 2021 13:09)  T(F): 97.5 (11 Oct 2021 04:23), Max: 97.7 (10 Oct 2021 13:09)  HR: 63 (11 Oct 2021 04:23) (58 - 66)  BP: 160/83 (11 Oct 2021 04:23) (131/69 - 160/83)  BP(mean): --  RR: 18 (11 Oct 2021 04:23) (16 - 18)  SpO2: 92% (11 Oct 2021 04:23) (92% - 96%)  Finger Stick          PHYSICAL EXAM:  GENERAL:  [  ] NAD , [  ] well appearing, [  ] Agitated, [  ] Drowsy,  [  ] Lethargy, [  ] confused   HEAD:  [  ] Normal, [  ] Other  EYES:  [  ] EOMI, [  ] PERRLA, [  ] conjunctiva and sclera clear normal, [  ] Other,  [  ] Pallor,[  ] Discharge  ENMT:  [  ] Normal, [  ] Moist mucous membranes, [  ] Good dentition, [  ] No Thrush  NECK:  [  ] Supple, [  ] No JVD, [  ] Normal thyroid, [  ] Lymphadenopathy [  ] Other  CHEST/LUNG:  [  ] Clear to auscultation bilaterally, [  ] Breath Sounds equal B/L / Decrease, [  ] poor effort  [  ] No rales, [  ] No rhonchi  [  ]  No wheezing,   HEART:  [  ] Regular rate and rhythm, [  ] tachycardia, [  ] Bradycardia,  [  ] irregular  [  ] No murmurs, No rubs, No gallops, [  ] PPM in place (Mfr:  )  ABDOMEN:  [  ] Soft, [  ] Nontender, [  ] Nondistended, [  ] No mass, [  ] Bowel sounds present, [  ] obese  NERVOUS SYSTEM:  [  ] Alert & Oriented X3, [  ] Nonfocal  [  ] Confusion  [  ] Encephalopathic [  ] Sedated [  ] Unable to assess, [  ] Dementia [  ] Other-  EXTREMITIES: [  ] 2+ Peripheral Pulses, No clubbing, No cyanosis,  [  ] edema B/L lower EXT. [  ] PVD stasis skin changes B/L Lower EXT, [  ] wound  LYMPH: No lymphadenopathy noted  SKIN:  [  ] No rashes or lesions, [  ] Pressure Ulcers, [  ] ecchymosis, [  ] Skin Tears, [  ] Other    DIET: Diet, Consistent Carbohydrate Renal w/Evening Snack:   DASH/TLC Sodium & Cholesterol Restricted (10-07-21 @ 02:46)      LABS:                        8.7    7.04  )-----------( 250      ( 11 Oct 2021 06:48 )             28.8     11 Oct 2021 06:48    139    |  109    |  42     ----------------------------<  118    3.5     |  23     |  2.90     Ca    8.6        11 Oct 2021 06:48    TPro  6.8    /  Alb  2.7    /  TBili  0.3    /  DBili  x      /  AST  13     /  ALT  19     /  AlkPhos  36     11 Oct 2021 06:48          Culture Results:   No growth to date. (10-07 @ 03:57)  Culture Results:   No growth to date. (10-07 @ 03:57)  Culture Results:   <10,000 CFU/mL Normal Urogenital Johnna (10-07 @ 02:42)          CARDIAC MARKERS ( 07 Oct 2021 14:20 )  .112 ng/mL / x     / x     / x     / x      CARDIAC MARKERS ( 07 Oct 2021 04:23 )  .145 ng/mL / x     / 106 U/L / x     / 2.2 ng/mL  CARDIAC MARKERS ( 06 Oct 2021 21:51 )  .149 ng/mL / x     / x     / x     / x              Culture - Blood (collected 07 Oct 2021 03:57)  Source: .Blood Blood-Peripheral  Preliminary Report (08 Oct 2021 04:00):    No growth to date.    Culture - Blood (collected 07 Oct 2021 03:57)  Source: .Blood Blood-Peripheral  Preliminary Report (08 Oct 2021 04:00):    No growth to date.    Culture - Urine (collected 07 Oct 2021 02:42)  Source: Clean Catch Clean Catch (Midstream)  Final Report (08 Oct 2021 12:49):    <10,000 CFU/mL Normal Urogenital Johnna         Anemia Panel:  Iron Total, Serum: 28 ug/dL (10-08-21 @ 22:27)  Iron - Total Binding Capacity.: 337 ug/dL (10-08-21 @ 22:27)  Vitamin B12, Serum: 413 pg/mL (10-08-21 @ 22:26)  Folate, Serum: 9.9 ng/mL (10-08-21 @ 22:26)  Ferritin, Serum: 28 ng/mL (10-08-21 @ 22:26)  Absolute Reticulocytes: 49.4 K/uL (10-08-21 @ 16:57)      Thyroid Panel:  Thyroid Stimulating Hormone, Serum: 1.35 uIU/mL (10-07-21 @ 04:23)            Immunofixation, Serum:   Weak IgG Kappa Band Identified    Reference Range: None Detected (10-08-21 @ 23:37)  Serum Protein Electrophoresis Interp: Weak Gamma-Migrating Paraprotein Identified (10-08-21 @ 23:37)  Serum Pro-Brain Natriuretic Peptide: 7438 pg/mL (10-07-21 @ 04:23)  Serum Pro-Brain Natriuretic Peptide: 8061 pg/mL (10-06-21 @ 21:49)      RADIOLOGY & ADDITIONAL TESTS:      HEALTH ISSUES - PROBLEM Dx:  PNA (pneumonia)    Hypertensive urgency    Chronic atrial fibrillation    Anemia of chronic disease    Stage 3 chronic kidney disease    Chronic diastolic congestive heart failure    Elevated troponin    Abnormal CT scan of lung    CAD (coronary artery disease)    Depression, major    DM (diabetes mellitus), type 2    DVT prophylaxis            Consultant(s) Notes Reviewed:  [  ] YES     Care Discussed with [X] Consultants  [  ] Patient  [  ] Family [  ] HCP [  ]   [  ] Social Service  [  ] RN, [  ] Physical Therapy,[  ] Palliative care team  DVT PPX: [  ] Lovenox, [  ] S C Heparin, [  ] Coumadin, [  ] Xarelto, [  ] Eliquis, [  ] Pradaxa, [  ] IV Heparin drip, [  ] SCD [  ] Contraindication 2 to GI Bleed,[  ] Ambulation [  ] Contraindicated 2 to  bleed [  ] Contraindicated 2 to Brain Bleed  Advanced directive: [  ] None, [  ] DNR/DNI Patient is a 76y old  Male who presents with a chief complaint of multilobar PNA, afib (11 Oct 2021 08:38)    HPI:  77 yo M with PMHx of Type 2 DM (not on insulin), BPH, CAD s/p stents >4 years ago (not on plavix), diverticulitis (hospitalized 07/2020 at [facility]), GIB 2/2 diverticulosis (2020), anxiety, Lupus, HTN, HLD, CKD stage 3, HepC, 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 Pt states he would intermittently feel chills and feverish, recorded no temps at group home since last couple of days, he states progressively worsening of dyspnea on exertion and rest, patient unable to walk short distances or climb stairs due to dyspnea, however denies orthopnea, cough, sputum production, night sweats. Patient mentioned some dizziness associated with lower extremities weakness, usually ambulates independently but now feels unable to hold his weight. Regarding palpitations, pt states for 3 days his heart would skip a beat, return to normal. Self limited episodes under a minute, no associated chest pain. Pt also describes urinary frequency beyond baseline, denies dysuria or incontinence, hematuria, constipation.     ED course:   VS: Afebrile, HR: 80 BP: 220/100->235/116-> 189/85, Spo2: 98 on NC RR: 20  Labs significant for low stable hemoglobin, BUN/creatinine: 36/2.30, Trop 0.149, Pro BMP 8061.   EKG on admission showed Afib @ 64 bpm, incompleta right BBB, LVH  CT head shows no  evidence of acute intracranial hemorrhage, midline shift or CT evidence of acute territorial infarct.  CT chest A/P demonstrated Multilobar pneumonia. Mild cardiomegaly. No mall bowel obstruction or active bowel inflammation.  Patient was given in the ED 10 mg IVP hydralazine 10 mg Labetalol IVP 1x, 2000 cc bolus NS 1x            (07 Oct 2021 01:24)  INTERVAL HPI:  10/07/21: Pt seen and examined at bedside; in no acute distress; complains of intermittent palpitation and transient chest pain. On Zosyn 3.375gm q8h + azithromycin 500mg daily; concern for medication compliance; ??capacity. will consult psych. K replaced  10/8/21: Pt seen and examined at bedside. Pt is lethargic and is sleeping although answers with yes and no questions. He has no acute complaints. Denies fevers, chills, chest pain, SOB, abdominal pain, n/v/d/c. On Zosyn 3.375gm q8h + azithromycin 500mg daily. Blood pressure improving. PT recommended LETI yesterday.  10/9/21: Pt seen and examined at bedside. Pt with no complaints this am. Worsening Cr function this am; Nephro on board. Will continue to monitor.   10/10/21: Pt seen and examined at bedside with no complaints this am; On renally dosed zosyn; Add Zithromax, improving renal function this am; pending psych consultation with Dr Winkler.  10/11/21: Patient was seen and examined at bedside. Patient did not want to provide history and wanted to continue sleeping. Patient states he is very sleepy and wants rest.     Overnight events: none     Home Medications:  cloNIDine 0.1 mg oral tablet: 2 tab(s) orally 3 times a day (07 Oct 2021 03:41)  digoxin 125 mcg (0.125 mg) oral tablet: 1 tab(s) orally once a day (07 Oct 2021 03:41)  Eliquis 5 mg oral tablet: 1 tab(s) orally 2 times a day (07 Oct 2021 03:41)  famotidine 40 mg oral tablet: 1 tab(s) orally once a day (at bedtime) (07 Oct 2021 03:41)  fenofibrate 145 mg oral tablet: 1 tab(s) orally once a day (07 Oct 2021 03:41)  labetalol 100 mg oral tablet: 1 tab(s) orally 2 times a day (07 Oct 2021 03:41)  Lasix 20 mg oral tablet: 1 tab(s) orally 2 times a day (07 Oct 2021 03:41)  oxybutynin 5 mg oral tablet: 1 tab(s) orally 4 times a day (07 Oct 2021 03:41)  pantoprazole 40 mg oral delayed release tablet: 1 tab(s) orally once a day (before a meal) (07 Oct 2021 03:41)  Symbicort 160 mcg-4.5 mcg/inh inhalation aerosol: 2 puff(s) inhaled 2 times a day (07 Oct 2021 03:41)      MEDICATIONS  (STANDING):  amLODIPine   Tablet 10 milliGRAM(s) Oral daily  apixaban 5 milliGRAM(s) Oral every 12 hours  ARIPiprazole 30 milliGRAM(s) Oral daily  aspirin enteric coated 81 milliGRAM(s) Oral daily  azithromycin   Tablet 500 milliGRAM(s) Oral daily  budesonide 160 MICROgram(s)/formoterol 4.5 MICROgram(s) Inhaler 2 Puff(s) Inhalation two times a day  cloNIDine 0.2 milliGRAM(s) Oral two times a day  cyanocobalamin 1000 MICROGram(s) Oral daily  dextrose 40% Gel 15 Gram(s) Oral once  dextrose 5%. 1000 milliLiter(s) (50 mL/Hr) IV Continuous <Continuous>  dextrose 50% Injectable 25 Gram(s) IV Push once  doxazosin 4 milliGRAM(s) Oral <User Schedule>  famotidine    Tablet 20 milliGRAM(s) Oral every 48 hours  folic acid 1 milliGRAM(s) Oral daily  glucagon  Injectable 1 milliGRAM(s) IntraMuscular once  hydrALAZINE 100 milliGRAM(s) Oral every 8 hours  insulin lispro (ADMELOG) corrective regimen sliding scale   SubCutaneous three times a day before meals  iron sucrose Injectable 100 milliGRAM(s) IV Push every 24 hours  lamoTRIgine 100 milliGRAM(s) Oral daily  mirtazapine 30 milliGRAM(s) Oral at bedtime  nystatin Powder 1 Application(s) Topical two times a day  oxybutynin 5 milliGRAM(s) Oral four times a day  pantoprazole    Tablet 40 milliGRAM(s) Oral before breakfast  piperacillin/tazobactam IVPB.. 3.375 Gram(s) IV Intermittent every 12 hours  venlafaxine XR. 150 milliGRAM(s) Oral daily    MEDICATIONS  (PRN):  acetaminophen   Tablet .. 650 milliGRAM(s) Oral every 6 hours PRN Temp greater or equal to 38C (100.4F), Mild Pain (1 - 3)  melatonin 3 milliGRAM(s) Oral at bedtime PRN Insomnia  ondansetron Injectable 4 milliGRAM(s) IV Push every 8 hours PRN Nausea and/or Vomiting  sodium chloride 0.65% Nasal 1 Spray(s) Both Nostrils two times a day PRN Nasal Congestion      Allergies    No Known Allergies    Intolerances        Social History:  Tobacco: quit in 1980, not active smoker  EtOH: denies   Recreational drug use: cocaine several years ago "a few times" has not used in "many years"  Lives with: [facility]; group home; no nursing assistance; observation is there  Ambulates: independent, denies walker or cane but uses guard railings  ADLs: independent, but states need for assistance in bathing, cooking; independent with feeding, ambulating  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 No nausea, No vomiting; No diarrhea, No constipation. [  ] BM  NEUROLOGICAL: No headaches, No dizziness, No numbness, No tingling, No tremors, No weakness  EXT: No Swelling, No Pain, No Edema  MUSCULOSKELETAL: No joint pain ,No Jt swelling; No muscle pain, No back pain, No extremity pain        Vital Signs Last 24 Hrs  T(C): 36.4 (11 Oct 2021 04:23), Max: 36.5 (10 Oct 2021 13:09)  T(F): 97.5 (11 Oct 2021 04:23), Max: 97.7 (10 Oct 2021 13:09)  HR: 63 (11 Oct 2021 04:23) (58 - 66)  BP: 160/83 (11 Oct 2021 04:23) (131/69 - 160/83)  BP(mean): --  RR: 18 (11 Oct 2021 04:23) (16 - 18)  SpO2: 92% (11 Oct 2021 04:23) (92% - 96%)  Finger Stick          PHYSICAL EXAM:  GENERAL:  NAD , well appearing, Drowsy  EYES: PERRLA  CHEST/LUNG: Clear to auscultation bilaterally, Breath Sounds equal B/L, No rales, rhonchi, wheezing,   HEART: Regular rate and rhythm, No murmurs, rubs, gallops  ABDOMEN:  soft, non-distended, normoactive bowel sounds   NERVOUS SYSTEM: drowsy; sleeping   EXTREMITIES: 2+ Peripheral Pulses, No clubbing, No cyanosis,     DIET: Diet, Consistent Carbohydrate Renal w/Evening Snack:   DASH/TLC Sodium & Cholesterol Restricted (10-07-21 @ 02:46)      LABS:                        8.7    7.04  )-----------( 250      ( 11 Oct 2021 06:48 )             28.8     11 Oct 2021 06:48    139    |  109    |  42     ----------------------------<  118    3.5     |  23     |  2.90     Ca    8.6        11 Oct 2021 06:48    TPro  6.8    /  Alb  2.7    /  TBili  0.3    /  DBili  x      /  AST  13     /  ALT  19     /  AlkPhos  36     11 Oct 2021 06:48          Culture Results:   No growth to date. (10-07 @ 03:57)  Culture Results:   No growth to date. (10-07 @ 03:57)  Culture Results:   <10,000 CFU/mL Normal Urogenital Johnna (10-07 @ 02:42)          CARDIAC MARKERS ( 07 Oct 2021 14:20 )  .112 ng/mL / x     / x     / x     / x      CARDIAC MARKERS ( 07 Oct 2021 04:23 )  .145 ng/mL / x     / 106 U/L / x     / 2.2 ng/mL  CARDIAC MARKERS ( 06 Oct 2021 21:51 )  .149 ng/mL / x     / x     / x     / x              Culture - Blood (collected 07 Oct 2021 03:57)  Source: .Blood Blood-Peripheral  Preliminary Report (08 Oct 2021 04:00):    No growth to date.    Culture - Blood (collected 07 Oct 2021 03:57)  Source: .Blood Blood-Peripheral  Preliminary Report (08 Oct 2021 04:00):    No growth to date.    Culture - Urine (collected 07 Oct 2021 02:42)  Source: Clean Catch Clean Catch (Midstream)  Final Report (08 Oct 2021 12:49):    <10,000 CFU/mL Normal Urogenital Johnna         Anemia Panel:  Iron Total, Serum: 28 ug/dL (10-08-21 @ 22:27)  Iron - Total Binding Capacity.: 337 ug/dL (10-08-21 @ 22:27)  Vitamin B12, Serum: 413 pg/mL (10-08-21 @ 22:26)  Folate, Serum: 9.9 ng/mL (10-08-21 @ 22:26)  Ferritin, Serum: 28 ng/mL (10-08-21 @ 22:26)  Absolute Reticulocytes: 49.4 K/uL (10-08-21 @ 16:57)      Thyroid Panel:  Thyroid Stimulating Hormone, Serum: 1.35 uIU/mL (10-07-21 @ 04:23)            Immunofixation, Serum:   Weak IgG Kappa Band Identified    Reference Range: None Detected (10-08-21 @ 23:37)  Serum Protein Electrophoresis Interp: Weak Gamma-Migrating Paraprotein Identified (10-08-21 @ 23:37)  Serum Pro-Brain Natriuretic Peptide: 7438 pg/mL (10-07-21 @ 04:23)  Serum Pro-Brain Natriuretic Peptide: 8061 pg/mL (10-06-21 @ 21:49)      RADIOLOGY & ADDITIONAL TESTS:      HEALTH ISSUES - PROBLEM Dx:  PNA (pneumonia)    Hypertensive urgency    Chronic atrial fibrillation    Anemia of chronic disease    Stage 3 chronic kidney disease    Chronic diastolic congestive heart failure    Elevated troponin    Abnormal CT scan of lung    CAD (coronary artery disease)    Depression, major    DM (diabetes mellitus), type 2    DVT prophylaxis            Consultant(s) Notes Reviewed:  [  ] YES     Care Discussed with [X] Consultants  [  ] Patient  [  ] Family [  ] HCP [  ]   [  ] Social Service  [  ] RN, [  ] Physical Therapy,[  ] Palliative care team  DVT PPX: [  ] Lovenox, [  ] S C Heparin, [  ] Coumadin, [  ] Xarelto, [  ] Eliquis, [  ] Pradaxa, [  ] IV Heparin drip, [  ] SCD [  ] Contraindication 2 to GI Bleed,[  ] Ambulation [  ] Contraindicated 2 to  bleed [  ] Contraindicated 2 to Brain Bleed  Advanced directive: [  ] None, [  ] DNR/DNI Patient is a 76y old  Male who presents with a chief complaint of multilobar PNA, afib (11 Oct 2021 08:38)    HPI:  75 yo M with PMHx of Type 2 DM (not on insulin), BPH, CAD s/p stents >4 years ago (not on plavix), diverticulitis (hospitalized 07/2020 at [facility]), GIB 2/2 diverticulosis (2020), anxiety, Lupus, HTN, HLD, CKD stage 3, HepC, 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 Pt states he would intermittently feel chills and feverish, recorded no temps at group home since last couple of days, he states progressively worsening of dyspnea on exertion and rest, patient unable to walk short distances or climb stairs due to dyspnea, however denies orthopnea, cough, sputum production, night sweats. Patient mentioned some dizziness associated with lower extremities weakness, usually ambulates independently but now feels unable to hold his weight. Regarding palpitations, pt states for 3 days his heart would skip a beat, return to normal. Self limited episodes under a minute, no associated chest pain. Pt also describes urinary frequency beyond baseline, denies dysuria or incontinence, hematuria, constipation.     ED course:   VS: Afebrile, HR: 80 BP: 220/100->235/116-> 189/85, Spo2: 98 on NC RR: 20  Labs significant for low stable hemoglobin, BUN/creatinine: 36/2.30, Trop 0.149, Pro BMP 8061.   EKG on admission showed Afib @ 64 bpm, incompleta right BBB, LVH  CT head shows no  evidence of acute intracranial hemorrhage, midline shift or CT evidence of acute territorial infarct.  CT chest A/P demonstrated Multilobar pneumonia. Mild cardiomegaly. No mall bowel obstruction or active bowel inflammation.  Patient was given in the ED 10 mg IVP hydralazine 10 mg Labetalol IVP 1x, 2000 cc bolus NS 1x     INTERVAL HPI:  10/07/21: Pt seen and examined at bedside; in no acute distress; complains of intermittent palpitation and transient chest pain. On Zosyn 3.375gm q8h + azithromycin 500mg daily; concern for medication compliance; ??capacity. will consult psych. K replaced  10/8/21: Pt seen and examined at bedside. Pt is lethargic and is sleeping although answers with yes and no questions. He has no acute complaints. Denies fevers, chills, chest pain, SOB, abdominal pain, n/v/d/c. On Zosyn 3.375gm q8h + azithromycin 500mg daily. Blood pressure improving. PT recommended LETI yesterday.  10/9/21: Pt seen and examined at bedside. Pt with no complaints this am. Worsening Cr function this am; Nephro on board. Will continue to monitor.   10/10/21: Pt seen and examined at bedside with no complaints this am; On renally dosed zosyn; Add Zithromax, improving renal function this am; pending psych consultation with Dr Winkler.  10/11/21: Patient was seen and examined at bedside. Patient did not want to provide history and wanted to continue sleeping. Patient states he is very sleepy and wants rest. On IV Zosyn /Po Zithromax     Overnight events: none     Home Medications:  cloNIDine 0.1 mg oral tablet: 2 tab(s) orally 3 times a day (07 Oct 2021 03:41)  digoxin 125 mcg (0.125 mg) oral tablet: 1 tab(s) orally once a day (07 Oct 2021 03:41)  Eliquis 5 mg oral tablet: 1 tab(s) orally 2 times a day (07 Oct 2021 03:41)  famotidine 40 mg oral tablet: 1 tab(s) orally once a day (at bedtime) (07 Oct 2021 03:41)  fenofibrate 145 mg oral tablet: 1 tab(s) orally once a day (07 Oct 2021 03:41)  labetalol 100 mg oral tablet: 1 tab(s) orally 2 times a day (07 Oct 2021 03:41)  Lasix 20 mg oral tablet: 1 tab(s) orally 2 times a day (07 Oct 2021 03:41)  oxybutynin 5 mg oral tablet: 1 tab(s) orally 4 times a day (07 Oct 2021 03:41)  pantoprazole 40 mg oral delayed release tablet: 1 tab(s) orally once a day (before a meal) (07 Oct 2021 03:41)  Symbicort 160 mcg-4.5 mcg/inh inhalation aerosol: 2 puff(s) inhaled 2 times a day (07 Oct 2021 03:41)      MEDICATIONS  (STANDING):  amLODIPine   Tablet 10 milliGRAM(s) Oral daily  apixaban 5 milliGRAM(s) Oral every 12 hours  ARIPiprazole 30 milliGRAM(s) Oral daily  aspirin enteric coated 81 milliGRAM(s) Oral daily  azithromycin   Tablet 500 milliGRAM(s) Oral daily  budesonide 160 MICROgram(s)/formoterol 4.5 MICROgram(s) Inhaler 2 Puff(s) Inhalation two times a day  cloNIDine 0.2 milliGRAM(s) Oral two times a day  cyanocobalamin 1000 MICROGram(s) Oral daily  dextrose 40% Gel 15 Gram(s) Oral once  dextrose 5%. 1000 milliLiter(s) (50 mL/Hr) IV Continuous <Continuous>  dextrose 50% Injectable 25 Gram(s) IV Push once  doxazosin 4 milliGRAM(s) Oral <User Schedule>  famotidine    Tablet 20 milliGRAM(s) Oral every 48 hours  folic acid 1 milliGRAM(s) Oral daily  glucagon  Injectable 1 milliGRAM(s) IntraMuscular once  hydrALAZINE 100 milliGRAM(s) Oral every 8 hours  insulin lispro (ADMELOG) corrective regimen sliding scale   SubCutaneous three times a day before meals  iron sucrose Injectable 100 milliGRAM(s) IV Push every 24 hours  lamoTRIgine 100 milliGRAM(s) Oral daily  mirtazapine 30 milliGRAM(s) Oral at bedtime  nystatin Powder 1 Application(s) Topical two times a day  oxybutynin 5 milliGRAM(s) Oral four times a day  pantoprazole    Tablet 40 milliGRAM(s) Oral before breakfast  piperacillin/tazobactam IVPB.. 3.375 Gram(s) IV Intermittent every 12 hours  venlafaxine XR. 150 milliGRAM(s) Oral daily    MEDICATIONS  (PRN):  acetaminophen   Tablet .. 650 milliGRAM(s) Oral every 6 hours PRN Temp greater or equal to 38C (100.4F), Mild Pain (1 - 3)  melatonin 3 milliGRAM(s) Oral at bedtime PRN Insomnia  ondansetron Injectable 4 milliGRAM(s) IV Push every 8 hours PRN Nausea and/or Vomiting  sodium chloride 0.65% Nasal 1 Spray(s) Both Nostrils two times a day PRN Nasal Congestion      Allergies    No Known Allergies    Intolerances        Social History:  Tobacco: quit in 1980, not active smoker  EtOH: denies   Recreational drug use: cocaine several years ago "a few times" has not used in "many years"  Lives with: [facility] house; group home; no nursing assistance; observation is there  Ambulates: independent, denies walker or cane but uses guard railings  ADLs: independent, but states need for assistance in bathing, cooking; independent with feeding, ambulating  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[place]  Vaccinations: Moderna x2 doses last dose in May (07 Oct 2021 01:24)      REVIEW OF SYSTEMS:  CONSTITUTIONAL: No fever, No chills, No fatigue, No myalgia, No Body ache, No Weakness  EYES: No eye pain,  No visual disturbances, No discharge, No Redness  ENMT: No ear pain, No nose bleed, No vertigo; No sinus pain, No throat pain, No Congestion  NECK: No pain, No stiffness  RESPIRATORY: No cough, No wheezing, No hemoptysis, No shortness of breath  CARDIOVASCULAR: No chest pain, No palpitations  GASTROINTESTINAL: No abdominal pain, No epigastric pain. No nausea, No vomiting, No diarrhea, No constipation; [  ] BM  GENITOURINARY: No dysuria, No frequency, No urgency, No hematuria, No incontinence  NEUROLOGICAL: No headaches, No dizziness, No numbness, No tingling, No tremors, No weakness  EXTREMITIES: No Swelling, No Pain, No Edema  SKIN: [ x ] No itching, burning, rashes, or lesions   MUSCULOSKELETAL: No joint pain, No joint swelling; No muscle pain, No back pain, No extremity pain  PSYCHIATRIC: No depression, No anxiety, No mood swings, No difficulty sleeping at night  PAIN SCALE: [ x ] None  [  ] Other-       Vital Signs Last 24 Hrs  T(C): 36.4 (11 Oct 2021 04:23), Max: 36.5 (10 Oct 2021 13:09)  T(F): 97.5 (11 Oct 2021 04:23), Max: 97.7 (10 Oct 2021 13:09)  HR: 63 (11 Oct 2021 04:23) (58 - 66)  BP: 160/83 (11 Oct 2021 04:23) (131/69 - 160/83)  BP(mean): --  RR: 18 (11 Oct 2021 04:23) (16 - 18)  SpO2: 92% (11 Oct 2021 04:23) (92% - 96%)  Finger Stick         PHYSICAL EXAM:  GENERAL:  [ x ] NAD, [ x ] Well appearing, [  ] Agitated, [  ] Drowsy, [  ] Lethargy, [  ] Confused   HEAD:  [ x ] Normal, [  ] Other  EYES:  [ x ] EOMI, [ x ] PERRLA, [ x ] Conjunctiva and sclera clear normal, [  ] Other, [  ] Pallor, [  ] Discharge  ENMT:  [ x ] Normal, [ x ] Moist mucous membranes, [  ] Good dentition, [ x ] No thrush  NECK:  [ x ] Supple, [ x ] No JVD, [ x ] Normal thyroid, [  ] Lymphadenopathy, [  ] Other  CHEST/LUNG:  [ x ] Clear to auscultation bilaterally, [ x ] Breath Sounds B/L / decreased, [  ] Poor effort, [ x ] No rales, [ x ] No rhonchi, [ x ] No wheezing  HEART:  [ x ] Regular rate and rhythm, [  ] Tachycardia, [  ] Bradycardia, [  ] Irregular, [ x ] No murmurs, No rubs, No gallops, [  ] PPM in place (Mfr:  )  ABDOMEN:  [ x ] Soft, [ x ] Nontender, [ x ] Nondistended, [ x ] No mass, [ x ] Bowel sounds present, [ x ] Obese  NERVOUS SYSTEM:  [ x ] Alert & Oriented x3, [ x ] Nonfocal, [  ] Confusion, [  ] Encephalopathic, [  ] Sedated, [  ] Unable to assess, [  ] Dementia, [  ] Other-  EXTREMITIES:  [ x ] 2+ Peripheral Pulses, No clubbing, No cyanosis,  [  ] Edema B/L lower EXT, [  ] PVD stasis skin changes B/L lower EXT, [  ] Wound  LYMPH:  No lymphadenopathy noted  SKIN:  [ x ] No rashes or lesions, [  ] Pressure ulcers, [  ] Ecchymosis, [  ] Skin tears, [ x ] Other- Tattoos on EXT     DIET: Diet, Consistent Carbohydrate Renal w/Evening Snack:   DASH/TLC Sodium & Cholesterol Restricted (10-07-21 @ 02:46)      LABS:                        8.7    7.04  )-----------( 250      ( 11 Oct 2021 06:48 )             28.8     11 Oct 2021 06:48    139    |  109    |  42     ----------------------------<  118    3.5     |  23     |  2.90     Ca    8.6        11 Oct 2021 06:48    TPro  6.8    /  Alb  2.7    /  TBili  0.3    /  DBili  x      /  AST  13     /  ALT  19     /  AlkPhos  36     11 Oct 2021 06:48      Culture Results:   No growth to date. (10-07 @ 03:57)  Culture Results:   No growth to date. (10-07 @ 03:57)  Culture Results:   <10,000 CFU/mL Normal Urogenital Johnna (10-07 @ 02:42)      CARDIAC MARKERS ( 07 Oct 2021 14:20 )  .112 ng/mL / x     / x     / x     / x      CARDIAC MARKERS ( 07 Oct 2021 04:23 )  .145 ng/mL / x     / 106 U/L / x     / 2.2 ng/mL  CARDIAC MARKERS ( 06 Oct 2021 21:51 )  .149 ng/mL / x     / x     / x     / x          Culture - Blood (collected 07 Oct 2021 03:57)  Source: .Blood Blood-Peripheral  Preliminary Report (08 Oct 2021 04:00):    No growth to date.    Culture - Blood (collected 07 Oct 2021 03:57)  Source: .Blood Blood-Peripheral  Preliminary Report (08 Oct 2021 04:00):    No growth to date.    Culture - Urine (collected 07 Oct 2021 02:42)  Source: Clean Catch Clean Catch (Midstream)  Final Report (08 Oct 2021 12:49):    <10,000 CFU/mL Normal Urogenital Johnna         Anemia Panel:  Iron Total, Serum: 28 ug/dL (10-08-21 @ 22:27)  Iron - Total Binding Capacity.: 337 ug/dL (10-08-21 @ 22:27)  Vitamin B12, Serum: 413 pg/mL (10-08-21 @ 22:26)  Folate, Serum: 9.9 ng/mL (10-08-21 @ 22:26)  Ferritin, Serum: 28 ng/mL (10-08-21 @ 22:26)  Absolute Reticulocytes: 49.4 K/uL (10-08-21 @ 16:57)      Thyroid Panel:  Thyroid Stimulating Hormone, Serum: 1.35 uIU/mL (10-07-21 @ 04:23)      Immunofixation, Serum:   Weak IgG Kappa Band Identified    Reference Range: None Detected (10-08-21 @ 23:37)  Serum Protein Electrophoresis Interp: Weak Gamma-Migrating Paraprotein Identified (10-08-21 @ 23:37)  Serum Pro-Brain Natriuretic Peptide: 7438 pg/mL (10-07-21 @ 04:23)  Serum Pro-Brain Natriuretic Peptide: 8061 pg/mL (10-06-21 @ 21:49)      RADIOLOGY & ADDITIONAL TESTS: none      HEALTH ISSUES - PROBLEM Dx:  PNA (pneumonia)    Hypertensive urgency    Chronic atrial fibrillation    Anemia of chronic disease    Stage 3 chronic kidney disease    Chronic diastolic congestive heart failure    Elevated troponin    Abnormal CT scan of lung    CAD (coronary artery disease)    Depression, major    DM (diabetes mellitus), type 2    DVT prophylaxis      Consultant(s) Notes Reviewed:  [ x ] YES     Care Discussed with [X] Consultants  [ x ] Patient  [  ] Family [  ] HCP [  ]   [ x ] Social Service  [ x ] RN, [  ] Physical Therapy,[  ] Palliative care team  DVT PPX: [  ] Lovenox, [ x ] S C Heparin, [  ] Coumadin, [  ] Xarelto, [  ] Eliquis, [  ] Pradaxa, [  ] IV Heparin drip, [  ] SCD [  ] Contraindication 2 to GI Bleed,[  ] Ambulation [  ] Contraindicated 2 to  bleed [  ] Contraindicated 2 to Brain Bleed  Advanced directive: [ x ] None, [  ] DNR/DNI

## 2021-10-11 NOTE — PROGRESS NOTE ADULT - PROBLEM SELECTOR PLAN 1
Multilobar PNA  - On admission does not meet sepsis criteria; afebrile, WBC WNL, normal lactate   - RIVP negative on admission   - CT Chest showed branching nodular opacities and patchy airspace opacities noted in the lingula and bilateral lower lobes which are likely infectious in etiology. No pleural effusions are present.   - s/p 1g Rocephin and Azithromycin 500 mg daily x 2 days  - Will continue renally dosed Zosyn  q 12 hrs-Renal Dose Abx with azithromycin 500 mg qd per ID Dr. Garner  - BCx NGTD,   - f/u legionella strep urine antigens  - F/up procalcitonin --> elevated 0.14  CBC in AM Multilobar PNA -improving   - On admission does not meet sepsis criteria; afebrile, WBC WNL, normal lactate   - RIVP negative on admission   - CT Chest showed branching nodular opacities and patchy airspace opacities noted in the lingula and bilateral lower lobes which are likely infectious in etiology. No pleural effusions are present.   - s/p 1g Rocephin and Azithromycin 500 mg daily x 2 days  - Will continue renally dosed Zosyn  q 12 hrs-Renal Dose Abx with azithromycin 500 mg qd per ID Dr. Garner  - BCx NGTD,   - procalcitonin --> elevated 0.14  CBC in AM

## 2021-10-11 NOTE — PROGRESS NOTE ADULT - ATTENDING COMMENTS
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 Patient admitted for hypertensive urgency, Afib and PNA.   pt seen, examined case & care plan d/w pt, residents at detail.  AM labs   PO diet   -ID-Dr Garner- continue Abx   Cardio DR Gamboa-follow up  -PT---> LETI  -Total care time 45 minutes.

## 2021-10-11 NOTE — PROGRESS NOTE ADULT - ASSESSMENT
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 house presenting to [facility] today with multiple concerns including subjective fevers, SOB, weakness, and brief episodes of palpitations, and urinary frequency "for at least 3 days". Patient admitted for hypertensive urgency, Afib and PNA.     ID f/u noted  curr on Zithro - Zosyn ABX rx regimen  HEME Onc eval noted - H and H reviewed - Labs reviewed -       pna eval   ID eval noted  biomarkers - cx -   on emp ABX  TTE and CT chest reviewed  LABS reviewed  pt with CKD - Anemia -   monitor VS and Sat - wean o2 as tolerated - keep sat > 88 pct  [facility] rx regimen and BP control - pt with known HTN - AF - CAD -   DM care - serial FS -   does not tolerate CPAP -   poss DC to [facility] -

## 2021-10-11 NOTE — PROGRESS NOTE ADULT - ASSESSMENT
[ASSESSMENT and  PLAN]  75yo M admitted with HTN urgency,  /100 and multilobar pneumonia; history of GI bleed with diverticulosis; also noted to have renal insufficiency  - empirically started on oral B12 and folate; but levels replete and stopped  - iron studies c/w iron deficiency; started on IV iron therapy  - remains on  Apixiban 5mg q12h for Afib  - awaiting completion of work-up for plasma cell disorder; immunofixation with weak IgG-kappa; await quantitative immunoglobulin levels and K/L light chain assay  - transfuse to maintain Hg >7  - will follow

## 2021-10-11 NOTE — PHARMACOTHERAPY INTERVENTION NOTE - COMMENTS
Patient has an active order for azithromycin 500 mg by mouth q24h (day 4) for pneumonia with legionella results pending.  Discussed with ID Dr. Garner about possibly discontinuing azithromycin since patient has completed a 3 day course.   MD aware and would like to continue until the legionella results.

## 2021-10-11 NOTE — PROGRESS NOTE ADULT - ASSESSMENT
ARIELLA, CKD 4  Diabetes  Pneumonia  Hypertension  SLE  Hypokalemia  Anemia    Renal indices improving. To continue current meds. On IV abx. Monitor blood sugar levels. Insulin coverage as needed. Monitor h/h trend.   Monitor BP trend. Titrate BP meds as needed. Salt restriction. PRN Potassium supplementation. Will follow electrolytes and renal function trend.

## 2021-10-11 NOTE — PROGRESS NOTE ADULT - SUBJECTIVE AND OBJECTIVE BOX
Date/Time Patient Seen:  		  Referring MD:   Data Reviewed	       Patient is a 76y old  Male who presents with a chief complaint of multilobar PNA, afib (10 Oct 2021 11:31)      Subjective/HPI     PAST MEDICAL & SURGICAL HISTORY:  Hypertension    Diabetes mellitus    Lupus    Hepatitis C    Anxiety and depression    CAD (coronary artery disease)  s/p stents    Diverticulosis    Hyperlipidemia    HTN (hypertension)  c/b multiple episodes of hypertensive urgency    HLD (hyperlipidemia)    Atrial fibrillation  first documented on EKG 10/7/2021    CAD (coronary artery disease)  s/p stents (not on plavix)    Type 2 diabetes mellitus  not on home insulin    Anxiety  multiple psych medications    History of diverticulitis  07/2021    Diverticulosis  c/b GIB in 2020    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  Had surgery ( April 2013 )    S/P tonsillectomy    S/P arthroscopic knee surgery  Bilateral ( 2005 )    Torsion of testicle  Had surgery at age 14    Pilonidal cyst  Had surgery ( 1969 )    S/P cataract surgery  Bilateral    H/O hernia repair          Medication list         MEDICATIONS  (STANDING):  amLODIPine   Tablet 10 milliGRAM(s) Oral daily  apixaban 5 milliGRAM(s) Oral every 12 hours  ARIPiprazole 30 milliGRAM(s) Oral daily  aspirin enteric coated 81 milliGRAM(s) Oral daily  azithromycin   Tablet 500 milliGRAM(s) Oral daily  budesonide 160 MICROgram(s)/formoterol 4.5 MICROgram(s) Inhaler 2 Puff(s) Inhalation two times a day  cloNIDine 0.2 milliGRAM(s) Oral two times a day  cyanocobalamin 1000 MICROGram(s) Oral daily  dextrose 40% Gel 15 Gram(s) Oral once  dextrose 5%. 1000 milliLiter(s) (50 mL/Hr) IV Continuous <Continuous>  dextrose 50% Injectable 25 Gram(s) IV Push once  doxazosin 4 milliGRAM(s) Oral <User Schedule>  famotidine    Tablet 20 milliGRAM(s) Oral every 48 hours  folic acid 1 milliGRAM(s) Oral daily  glucagon  Injectable 1 milliGRAM(s) IntraMuscular once  hydrALAZINE 100 milliGRAM(s) Oral every 8 hours  insulin lispro (ADMELOG) corrective regimen sliding scale   SubCutaneous three times a day before meals  iron sucrose Injectable 100 milliGRAM(s) IV Push every 24 hours  lamoTRIgine 100 milliGRAM(s) Oral daily  mirtazapine 30 milliGRAM(s) Oral at bedtime  nystatin Powder 1 Application(s) Topical two times a day  oxybutynin 5 milliGRAM(s) Oral four times a day  pantoprazole    Tablet 40 milliGRAM(s) Oral before breakfast  piperacillin/tazobactam IVPB.. 3.375 Gram(s) IV Intermittent every 12 hours  venlafaxine XR. 150 milliGRAM(s) Oral daily    MEDICATIONS  (PRN):  acetaminophen   Tablet .. 650 milliGRAM(s) Oral every 6 hours PRN Temp greater or equal to 38C (100.4F), Mild Pain (1 - 3)  melatonin 3 milliGRAM(s) Oral at bedtime PRN Insomnia  ondansetron Injectable 4 milliGRAM(s) IV Push every 8 hours PRN Nausea and/or Vomiting  sodium chloride 0.65% Nasal 1 Spray(s) Both Nostrils two times a day PRN Nasal Congestion         Vitals log        ICU Vital Signs Last 24 Hrs  T(C): 36.4 (11 Oct 2021 04:23), Max: 36.5 (10 Oct 2021 13:09)  T(F): 97.5 (11 Oct 2021 04:23), Max: 97.7 (10 Oct 2021 13:09)  HR: 63 (11 Oct 2021 04:23) (58 - 66)  BP: 160/83 (11 Oct 2021 04:23) (131/69 - 160/83)  BP(mean): --  ABP: --  ABP(mean): --  RR: 18 (11 Oct 2021 04:23) (16 - 18)  SpO2: 92% (11 Oct 2021 04:23) (92% - 96%)           Input and Output:  I&O's Detail    09 Oct 2021 07:01  -  10 Oct 2021 07:00  --------------------------------------------------------  IN:    IV PiggyBack: 75 mL    Oral Fluid: 480 mL  Total IN: 555 mL    OUT:    Voided (mL): 200 mL  Total OUT: 200 mL    Total NET: 355 mL          Lab Data                        8.6    6.70  )-----------( 245      ( 10 Oct 2021 08:56 )             28.4     10-10    138  |  106  |  44<H>  ----------------------------<  130<H>  3.7   |  25  |  3.20<H>    Ca    8.4<L>      10 Oct 2021 08:57  Phos  6.0     10-09  Mg     2.5     10-09    TPro  7.0  /  Alb  2.9<L>  /  TBili  0.3  /  DBili  x   /  AST  14<L>  /  ALT  22  /  AlkPhos  37<L>  10-10            Review of Systems	      Objective     Physical Examination  heart s1s2  lung dec BS  abd soft        Pertinent Lab findings & Imaging      Nikko:  NO   Adequate UO     I&O's Detail    09 Oct 2021 07:01  -  10 Oct 2021 07:00  --------------------------------------------------------  IN:    IV PiggyBack: 75 mL    Oral Fluid: 480 mL  Total IN: 555 mL    OUT:    Voided (mL): 200 mL  Total OUT: 200 mL    Total NET: 355 mL               Discussed with:     Cultures:	        Radiology

## 2021-10-11 NOTE — PROGRESS NOTE ADULT - SUBJECTIVE AND OBJECTIVE BOX
Patient seen and examined;  Chart reviewed and events noted;   sitting in chair; feeling much better; reports after sleeping for 12 hours he feels "really good"      MEDICATIONS  (STANDING):  amLODIPine   Tablet 10 milliGRAM(s) Oral daily  apixaban 5 milliGRAM(s) Oral every 12 hours  ARIPiprazole 30 milliGRAM(s) Oral daily  aspirin enteric coated 81 milliGRAM(s) Oral daily  azithromycin   Tablet 500 milliGRAM(s) Oral daily  budesonide 160 MICROgram(s)/formoterol 4.5 MICROgram(s) Inhaler 2 Puff(s) Inhalation two times a day  cloNIDine 0.2 milliGRAM(s) Oral two times a day  cyanocobalamin 1000 MICROGram(s) Oral daily  dextrose 40% Gel 15 Gram(s) Oral once  dextrose 5%. 1000 milliLiter(s) (50 mL/Hr) IV Continuous <Continuous>  dextrose 50% Injectable 25 Gram(s) IV Push once  doxazosin 4 milliGRAM(s) Oral <User Schedule>  famotidine    Tablet 20 milliGRAM(s) Oral every 48 hours  folic acid 1 milliGRAM(s) Oral daily  glucagon  Injectable 1 milliGRAM(s) IntraMuscular once  hydrALAZINE 100 milliGRAM(s) Oral every 8 hours  insulin lispro (ADMELOG) corrective regimen sliding scale   SubCutaneous three times a day before meals  iron sucrose Injectable 100 milliGRAM(s) IV Push every 24 hours  lamoTRIgine 100 milliGRAM(s) Oral daily  mirtazapine 30 milliGRAM(s) Oral at bedtime  nystatin Powder 1 Application(s) Topical two times a day  oxybutynin 5 milliGRAM(s) Oral four times a day  pantoprazole    Tablet 40 milliGRAM(s) Oral before breakfast  piperacillin/tazobactam IVPB.. 3.375 Gram(s) IV Intermittent every 12 hours  venlafaxine XR. 150 milliGRAM(s) Oral daily    MEDICATIONS  (PRN):  acetaminophen   Tablet .. 650 milliGRAM(s) Oral every 6 hours PRN Temp greater or equal to 38C (100.4F), Mild Pain (1 - 3)  melatonin 3 milliGRAM(s) Oral at bedtime PRN Insomnia  ondansetron Injectable 4 milliGRAM(s) IV Push every 8 hours PRN Nausea and/or Vomiting  sodium chloride 0.65% Nasal 1 Spray(s) Both Nostrils two times a day PRN Nasal Congestion      Vital Signs Last 24 Hrs  T(C): 36.4 (11 Oct 2021 04:23), Max: 36.5 (10 Oct 2021 13:09)  T(F): 97.5 (11 Oct 2021 04:23), Max: 97.7 (10 Oct 2021 13:09)  HR: 63 (11 Oct 2021 04:23) (58 - 66)  BP: 160/83 (11 Oct 2021 04:23) (131/69 - 160/83)  BP(mean): --  RR: 18 (11 Oct 2021 04:23) (16 - 18)  SpO2: 92% (11 Oct 2021 04:23) (92% - 96%)    PHYSICAL EXAM  General: adult in NAD  HEENT: clear oropharynx, anicteric sclera, pink conjunctivae  Neck: supple  CV: normal S1S2 with no murmur rubs or gallops  Lungs: clear to auscultation, no wheezes, no rhales  Abdomen: soft non-tender non-distended, no hepato/splenomegaly  Ext: no clubbing cyanosis or edema  Skin: no rashes and no petichiae  Neuro: alert and oriented X3 no focal deficits      LABS:                        8.7    7.04  )-----------( 250      ( 11 Oct 2021 06:48 )             28.8     Hemoglobin: 8.7 g/dL (10-11 @ 06:48)  Hemoglobin: 8.6 g/dL (10-10 @ 08:56)  Hemoglobin: 8.3 g/dL (10-09 @ 07:10)  Hemoglobin: 8.7 g/dL (10-08 @ 07:58)  Hemoglobin: 9.8 g/dL (10-07 @ 04:23)    10-11    139  |  109<H>  |  42<H>  ----------------------------<  118<H>  3.5   |  23  |  2.90<H>    Ca    8.6      11 Oct 2021 06:48    TPro  6.8  /  Alb  2.7<L>  /  TBili  0.3  /  DBili  x   /  AST  13<L>  /  ALT  19  /  AlkPhos  36<L>  10-11    Serum immunofixation - Weak IgG-Kappa  immunoglobulin level and K/L light chain assays pending

## 2021-10-11 NOTE — PROGRESS NOTE ADULT - SUBJECTIVE AND OBJECTIVE BOX
[facility]  INFECTIOUS DISEASES   [address] [place] [address]  Tel: [phone]     Fax: [phone]  =======================================================    [id number]  TRIPP ZARATE     Follow up: multilobar pneumonia    Patient seen and examined.   Clinically has improved significantly. No fever, no SOB.  This morning when examining him, was not on O2 but was comfortable. No other complaint or overnight event.    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 3.375 Gram(s) IV Intermittent every 8 hours     REVIEW OF SYSTEMS:  CONSTITUTIONAL:  No Fever or chills  HEENT:  No diplopia or blurred vision.  No sore throat or runny nose.  CARDIOVASCULAR:  No chest pain or SOB.  RESPIRATORY:  No cough, shortness of breath, PND or orthopnea.  GASTROINTESTINAL:  No nausea, vomiting or diarrhea.  GENITOURINARY:  No dysuria, frequency or urgency. No Blood in urine  MUSCULOSKELETAL:  no joint aches, no muscle pain  SKIN:  No change in skin, hair or nails.  NEUROLOGIC:  No paresthesias, fasciculations, seizures or weakness.  PSYCHIATRIC:  No disorder of thought or mood.  ENDOCRINE:  No heat or cold intolerance, polyuria or polydipsia.  HEMATOLOGICAL:  No easy bruising or bleeding.     Physical Exam:  Vital Signs Last 24 Hrs  T(C): 36.7 (11 Oct 2021 11:59), Max: 36.7 (11 Oct 2021 11:59)  T(F): 98.1 (11 Oct 2021 11:59), Max: 98.1 (11 Oct 2021 11:59)  HR: 75 (11 Oct 2021 11:59) (58 - 75)  BP: 143/67 (11 Oct 2021 11:59) (132/58 - 160/83)  BP(mean): --  RR: 18 (11 Oct 2021 11:59) (17 - 18)  SpO2: 89% (11 Oct 2021 11:59) (89% - 96%)  GEN: NAD  HEENT: normocephalic and atraumatic. EOMI. PERRL.    NECK: Supple.  No lymphadenopathy   LUNGS: Coarse crackles, o wheezing or fine rales   HEART: Irregular rate and rhythm  ABDOMEN: Soft, nontender, and nondistended.  Positive bowel sounds.    : No CVA tenderness  EXTREMITIES: 1+ edema.  NEUROLOGIC: grossly intact.  PSYCHIATRIC: Appropriate affect .  SKIN: No ulceration or induration present.    Labs:                        8.7    7.04  )-----------( 250      ( 11 Oct 2021 06:48 )             28.8     10-11    139  |  109<H>  |  42<H>  ----------------------------<  118<H>  3.5   |  23  |  2.90<H>    Ca    8.6      11 Oct 2021 06:48    TPro  6.8  /  Alb  2.7<L>  /  TBili  0.3  /  DBili  x   /  AST  13<L>  /  ALT  19  /  AlkPhos  36<L>  10-11    Culture - Blood (collected 10-07-21 @ 03:57)  Source: .Blood Blood-Peripheral    Culture - Blood (collected 10-07-21 @ 03:57)  Source: .Blood Blood-Peripheral    Culture - Urine (collected 10-07-21 @ 02:42)  Source: Clean Catch Clean Catch (Midstream)  Final Report (10-08-21 @ 12:49):    <10,000 CFU/mL Normal Urogenital Johnna    WBC Count: 7.04 K/uL (10-11-21 @ 06:48)  WBC Count: 6.70 K/uL (10-10-21 @ 08:56)  WBC Count: 6.07 K/uL (10-09-21 @ 07:10)  WBC Count: 7.35 K/uL (10-08-21 @ 07:58)  WBC Count: 8.57 K/uL (10-07-21 @ 04:23)  WBC Count: 8.49 K/uL (10-06-21 @ 21:49)    Creatinine, Serum: 2.90 mg/dL (10-11-21 @ 06:48)  Creatinine, Serum: 3.20 mg/dL (10-10-21 @ 08:57)  Creatinine, Serum: 3.50 mg/dL (10-09-21 @ 07:10)  Creatinine, Serum: 3.10 mg/dL (10-08-21 @ 07:58)  Creatinine, Serum: 2.00 mg/dL (10-07-21 @ 04:23)  Creatinine, Serum: 2.30 mg/dL (10-06-21 @ 21:49)    C-Reactive Protein, Serum: 16 mg/L (10-08-21 @ 22:27)    Ferritin, Serum: 28 ng/mL (10-08-21 @ 22:26)    Sedimentation Rate, Erythrocyte: 28 mm/hr (10-08-21 @ 16:57)    Procalcitonin, Serum: 0.14 ng/mL (10-07-21 @ 04:23)     SARS-CoV-2: NotDetec (10-06-21 @ 21:49)  Rapid RVP Result: NotDetec (10-06-21 @ 21:49)    All imaging and other data have been reviewed.  < from: CT Chest No Cont (10.07.21 @ 00:03) >  IMPRESSION:  Multilobar pneumonia.  Mild cardiomegaly.  No small bowel obstruction or active bowel inflammation.    Assessment and Plan:   75 yo man with PMH of HTN, DM2, CAD, HepC, CKD and afib was admitted from [facility] with subjective fevers, SOB, weakness, and brief episodes of palpitations, and urinary frequency for 3 days.  No leukocytosis or documented fever  CT with Multilobar pneumonia  Procalcitonin 0.14     1- Multilobar pneumonia   - Blood culture x 2 NGTD  - UA and UC negative   - Continue Zosyn 3.375gm q12 for total 7days   - Continue azithromycin 500mg daily for total 5days   - Can be switched to oral when ready for discharge.   - Renal function is stabilizing     Will follow PRN.    Suzie Garner MD  Division of Infectious Diseases   Cell [phone] between 8am and 6pm   After 6pm and weekends please call ID service at [phone].

## 2021-10-11 NOTE — PROGRESS NOTE ADULT - ASSESSMENT
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) living in a group home [facility] house presenting to [facility] today with multiple concerns including subjective fevers, SOB, weakness, and brief episodes of palpitations, and urinary frequency "for at least 3 days". Patient admitted for hypertensive urgency, Afib and PNA.      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) living in a group home [facility] house presenting to [facility] today with multiple concerns including subjective fevers, SOB, weakness, and brief episodes of palpitations, and urinary frequency "for at least 3 days". Patient admitted for hypertensive urgency, Afib and PNA.     Problem/Plan - 1: PNA (pneumonia).   - Multilobar PNA  - On admission does not meet sepsis criteria; afebrile, WBC WNL, normal lactate   - RIVP negative on admission   - CT Chest showed branching nodular opacities and patchy airspace opacities noted in the lingula and bilateral lower lobes which are likely infectious in etiology. No pleural effusions are present.   - s/p 1g Rocephin and Azithromycin 500 mg daily x 2 days  - Will continue renally dosed Zosyn  q 12 hrs-Renal Dose Abx with azithromycin 500 mg qd per ID Dr. Garner  - BCx NGTD,   - f/u legionella strep urine antigens- pending   - F/up procalcitonin --> elevated 0.14  - CBC in AM     Problem/Plan - 2: Hypertensive urgency.   -  On admission /100 --> BP elevated but is improving 2/2 NON Compliance likely  - Of note patient recently admitted with hypertensive urgency on 07/2021  - d/c labetalol 100 mg BID in the setting of Bradycardia &  worsening renal function. as per Renal ARIELLA 2/2 tight control of BP   - Switched to clonidine 0.2 BID from TID in the setting of Bradycardia & worsening renal function, as per Renal ARIELLA 2/2 tight control of BP .   - will continue hydralazine 100 mg q 8 hrs + amlodipine 10 mg qd   - Will continue with home medications with hold parameters   - Continue to monitor BP.     Problem/Plan - 3: Chronic atrial fibrillation.   - Patient presents with new onset Afib, however pt's PCP prescribed digoxin, Eliquis by PCP on sept 14/21  - EKG on admission showed Afib @ 64 bpm, incompleta right BBB, LVH   - Last TTE on 7/21/21 demonstrate normal left ventricular systolic function, estimated LVEF of 60%.LV diastolic dysfunction.  - On , Eliquis will continue, digoxin discontinued- level 0.6  - On labetalol- will hold   - Will continue with Eliquis 5mg BID   - BMP in am  - Continuous Tele   - Cardiology Dr Gamboa called by ER, f/u daily recs- Bradycardia.     Problem/Plan - 4: Elevated troponin.   - likely demand ischemia in setting of hypertensive urgency   - Troponin trended to peak  - NOT ACS per cardiology Dr. Gamboa.     Problem/Plan - 5: Stage 3 chronic kidney disease.   - Acute ARIELLA on CKD 3-4  - improving reanal fucntion this am;  3.2<-- 3.5 <-- 3.1 <--2.0 <--2.30 <--1.60  - HOLD Home Lasix   - Avoid nephrotoxic medications  - BMP in AM   - Dr. Cayden brown following, recs appreciated - check bladder scan, - f/u results  - c3 c4 complement, dsDNA, albumin/globulin ratio, quantitative IgG -WNL  - Weak migrating para protein   -As per Renal increase Cr is likely 2/2 tight control of BP- Will STOP BB     Problem/Plan - 6: Anemia of chronic disease.   -  H/H 10.1/32 on admission, baseline hemoglobin 8.0-9.0  - Currently hemodynamically stable, likely related to CKD   - Iron studies on 07/21 demonstrated iron deficiency anemia   - F/u AM CBC, Follow w /up R/O -Plasma cell disorder- c3 c4 complement, dsDNA, albumin/globulin ratio, quantitative IgG -WNL, Weak migrating para protein   -D/W Dr Rob.     Problem/Plan - 7: Chronic diastolic congestive heart failure.   - Patient presenting with worsening dyspnea on rest and exertion   - Does not appear volume overloaded on exam today   - Last TTE on 07/21  Last TTE on 7/21/21 demonstrate normal left ventricular systolic function, estimated LVEF of 60%.LV diastolic dysfunction.  - On admission Pro BNP 8061 elevated compare to previous one on July 4140  - s/p IVF 2 lt bolus in the ED   - On Lasix 20 mg BID, will continue w/ renal function monitor   - Strict I&O's.     Problem/Plan - 8: Abnormal CT scan of lung.   - Abnormal CT finding   -CT chest on admission showed Multiple heterogeneous thyroid nodules noted bilaterally  - Will check TSH - 1.35 from 10/7   - Recommended to follow up outpatient.     Problem/Plan - 9: CAD (coronary artery disease).   - Elevated troponin in setting of hypertensive urgency  - trops trended to peak - ACS RULED OUT  - On ASA continue   - fenofibrate discontinued in the setting of worsening renal function   - Monitor and replete lytes, keep K>4, Mg>2.     Problem/Plan - 10: Depression, major  - Chronic   - On Mirtazapine, venlafaxin, Lamictal, will continue.     Problem/Plan - 11: DM (diabetes mellitus), type 2.   -  History of T2DM   - last A1C on 07/21 6.1  - Off of meds   - Insulin sliding scale; FS well controlled   - Accu checks w/ hypoglycemic protocol.     Problem/Plan - 12: DVT prophylaxis  - DVT ppx: Continue home Eliquis 5mg BID    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) living in a group home [facility] house presenting to [facility] today with multiple concerns including subjective fevers, SOB, weakness, and brief episodes of palpitations, and urinary frequency "for at least 3 days". Patient admitted for hypertensive urgency, Afib and PNA.

## 2021-10-12 DIAGNOSIS — E04.2 NONTOXIC MULTINODULAR GOITER: ICD-10-CM

## 2021-10-12 LAB
ALBUMIN SERPL ELPH-MCNC: 2.7 G/DL — LOW (ref 3.3–5)
ALP SERPL-CCNC: 36 U/L — LOW (ref 40–120)
ALT FLD-CCNC: 18 U/L — SIGNIFICANT CHANGE UP (ref 12–78)
ANION GAP SERPL CALC-SCNC: 7 MMOL/L — SIGNIFICANT CHANGE UP (ref 5–17)
AST SERPL-CCNC: 11 U/L — LOW (ref 15–37)
BASOPHILS # BLD AUTO: 0.02 K/UL — SIGNIFICANT CHANGE UP (ref 0–0.2)
BASOPHILS NFR BLD AUTO: 0.3 % — SIGNIFICANT CHANGE UP (ref 0–2)
BILIRUB SERPL-MCNC: 0.2 MG/DL — SIGNIFICANT CHANGE UP (ref 0.2–1.2)
BUN SERPL-MCNC: 39 MG/DL — HIGH (ref 7–23)
CALCIUM SERPL-MCNC: 8.7 MG/DL — SIGNIFICANT CHANGE UP (ref 8.5–10.1)
CHLORIDE SERPL-SCNC: 110 MMOL/L — HIGH (ref 96–108)
CO2 SERPL-SCNC: 23 MMOL/L — SIGNIFICANT CHANGE UP (ref 22–31)
CREAT SERPL-MCNC: 2.8 MG/DL — HIGH (ref 0.5–1.3)
CULTURE RESULTS: SIGNIFICANT CHANGE UP
CULTURE RESULTS: SIGNIFICANT CHANGE UP
EOSINOPHIL # BLD AUTO: 0.16 K/UL — SIGNIFICANT CHANGE UP (ref 0–0.5)
EOSINOPHIL NFR BLD AUTO: 2.8 % — SIGNIFICANT CHANGE UP (ref 0–6)
GLUCOSE SERPL-MCNC: 102 MG/DL — HIGH (ref 70–99)
HCT VFR BLD CALC: 26.5 % — LOW (ref 39–50)
HGB BLD-MCNC: 8 G/DL — LOW (ref 13–17)
IMM GRANULOCYTES NFR BLD AUTO: 0.3 % — SIGNIFICANT CHANGE UP (ref 0–1.5)
LYMPHOCYTES # BLD AUTO: 0.88 K/UL — LOW (ref 1–3.3)
LYMPHOCYTES # BLD AUTO: 15.3 % — SIGNIFICANT CHANGE UP (ref 13–44)
MCHC RBC-ENTMCNC: 25.8 PG — LOW (ref 27–34)
MCHC RBC-ENTMCNC: 30.2 GM/DL — LOW (ref 32–36)
MCV RBC AUTO: 85.5 FL — SIGNIFICANT CHANGE UP (ref 80–100)
MONOCYTES # BLD AUTO: 0.47 K/UL — SIGNIFICANT CHANGE UP (ref 0–0.9)
MONOCYTES NFR BLD AUTO: 8.1 % — SIGNIFICANT CHANGE UP (ref 2–14)
NEUTROPHILS # BLD AUTO: 4.22 K/UL — SIGNIFICANT CHANGE UP (ref 1.8–7.4)
NEUTROPHILS NFR BLD AUTO: 73.2 % — SIGNIFICANT CHANGE UP (ref 43–77)
NRBC # BLD: 0 /100 WBCS — SIGNIFICANT CHANGE UP (ref 0–0)
PLATELET # BLD AUTO: 248 K/UL — SIGNIFICANT CHANGE UP (ref 150–400)
POTASSIUM SERPL-MCNC: 3.4 MMOL/L — LOW (ref 3.5–5.3)
POTASSIUM SERPL-SCNC: 3.4 MMOL/L — LOW (ref 3.5–5.3)
PROCALCITONIN SERPL-MCNC: 0.24 NG/ML — HIGH (ref 0–0.04)
PROT SERPL-MCNC: 6.4 G/DL — SIGNIFICANT CHANGE UP (ref 6–8.3)
RBC # BLD: 3.1 M/UL — LOW (ref 4.2–5.8)
RBC # FLD: 17.2 % — HIGH (ref 10.3–14.5)
SODIUM SERPL-SCNC: 140 MMOL/L — SIGNIFICANT CHANGE UP (ref 135–145)
SPECIMEN SOURCE: SIGNIFICANT CHANGE UP
SPECIMEN SOURCE: SIGNIFICANT CHANGE UP
TSH SERPL-MCNC: 0.89 UIU/ML — SIGNIFICANT CHANGE UP (ref 0.36–3.74)
WBC # BLD: 5.77 K/UL — SIGNIFICANT CHANGE UP (ref 3.8–10.5)
WBC # FLD AUTO: 5.77 K/UL — SIGNIFICANT CHANGE UP (ref 3.8–10.5)

## 2021-10-12 PROCEDURE — 99232 SBSQ HOSP IP/OBS MODERATE 35: CPT

## 2021-10-12 PROCEDURE — 76536 US EXAM OF HEAD AND NECK: CPT | Mod: 26

## 2021-10-12 RX ORDER — POTASSIUM CHLORIDE 20 MEQ
40 PACKET (EA) ORAL ONCE
Refills: 0 | Status: COMPLETED | OUTPATIENT
Start: 2021-10-12 | End: 2021-10-12

## 2021-10-12 RX ORDER — DOXAZOSIN MESYLATE 4 MG
8 TABLET ORAL AT BEDTIME
Refills: 0 | Status: DISCONTINUED | OUTPATIENT
Start: 2021-10-12 | End: 2021-10-16

## 2021-10-12 RX ORDER — POTASSIUM CHLORIDE 20 MEQ
40 PACKET (EA) ORAL ONCE
Refills: 0 | Status: DISCONTINUED | OUTPATIENT
Start: 2021-10-12 | End: 2021-10-12

## 2021-10-12 RX ADMIN — PANTOPRAZOLE SODIUM 40 MILLIGRAM(S): 20 TABLET, DELAYED RELEASE ORAL at 05:44

## 2021-10-12 RX ADMIN — Medication 1 TABLET(S): at 17:26

## 2021-10-12 RX ADMIN — APIXABAN 5 MILLIGRAM(S): 2.5 TABLET, FILM COATED ORAL at 05:45

## 2021-10-12 RX ADMIN — PREGABALIN 1000 MICROGRAM(S): 225 CAPSULE ORAL at 11:38

## 2021-10-12 RX ADMIN — Medication 100 MILLIGRAM(S): at 22:54

## 2021-10-12 RX ADMIN — NYSTATIN CREAM 1 APPLICATION(S): 100000 CREAM TOPICAL at 05:45

## 2021-10-12 RX ADMIN — Medication 5 MILLIGRAM(S): at 11:38

## 2021-10-12 RX ADMIN — Medication 100 MILLIGRAM(S): at 05:44

## 2021-10-12 RX ADMIN — Medication 81 MILLIGRAM(S): at 11:38

## 2021-10-12 RX ADMIN — Medication 150 MILLIGRAM(S): at 11:38

## 2021-10-12 RX ADMIN — AMLODIPINE BESYLATE 10 MILLIGRAM(S): 2.5 TABLET ORAL at 05:45

## 2021-10-12 RX ADMIN — Medication 8 MILLIGRAM(S): at 22:55

## 2021-10-12 RX ADMIN — Medication 650 MILLIGRAM(S): at 13:03

## 2021-10-12 RX ADMIN — NYSTATIN CREAM 1 APPLICATION(S): 100000 CREAM TOPICAL at 17:27

## 2021-10-12 RX ADMIN — Medication 0.2 MILLIGRAM(S): at 05:45

## 2021-10-12 RX ADMIN — AZITHROMYCIN 500 MILLIGRAM(S): 500 TABLET, FILM COATED ORAL at 11:42

## 2021-10-12 RX ADMIN — Medication 650 MILLIGRAM(S): at 13:08

## 2021-10-12 RX ADMIN — Medication 3 MILLIGRAM(S): at 22:54

## 2021-10-12 RX ADMIN — Medication 5 MILLIGRAM(S): at 17:26

## 2021-10-12 RX ADMIN — Medication 5 MILLIGRAM(S): at 05:44

## 2021-10-12 RX ADMIN — Medication 650 MILLIGRAM(S): at 00:20

## 2021-10-12 RX ADMIN — APIXABAN 5 MILLIGRAM(S): 2.5 TABLET, FILM COATED ORAL at 17:27

## 2021-10-12 RX ADMIN — ARIPIPRAZOLE 30 MILLIGRAM(S): 15 TABLET ORAL at 11:38

## 2021-10-12 RX ADMIN — LAMOTRIGINE 100 MILLIGRAM(S): 25 TABLET, ORALLY DISINTEGRATING ORAL at 11:38

## 2021-10-12 RX ADMIN — BUDESONIDE AND FORMOTEROL FUMARATE DIHYDRATE 2 PUFF(S): 160; 4.5 AEROSOL RESPIRATORY (INHALATION) at 22:58

## 2021-10-12 RX ADMIN — MIRTAZAPINE 30 MILLIGRAM(S): 45 TABLET, ORALLY DISINTEGRATING ORAL at 22:55

## 2021-10-12 RX ADMIN — Medication 4 MILLIGRAM(S): at 08:46

## 2021-10-12 RX ADMIN — Medication 100 MILLIGRAM(S): at 13:03

## 2021-10-12 RX ADMIN — Medication 40 MILLIEQUIVALENT(S): at 17:26

## 2021-10-12 RX ADMIN — PIPERACILLIN AND TAZOBACTAM 25 GRAM(S): 4; .5 INJECTION, POWDER, LYOPHILIZED, FOR SOLUTION INTRAVENOUS at 05:45

## 2021-10-12 RX ADMIN — Medication 1 MILLIGRAM(S): at 11:38

## 2021-10-12 RX ADMIN — Medication 5 MILLIGRAM(S): at 22:55

## 2021-10-12 NOTE — PROGRESS NOTE ADULT - ASSESSMENT
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) living in a group home [facility] house presenting to [facility] today with multiple concerns including subjective fevers, SOB, weakness, and brief episodes of palpitations, and urinary frequency "for at least 3 days". Patient admitted for hypertensive urgency, Afib and PNA.        Problem/Plan - 1:PNA (pneumonia)  - Multilobar PNA -improving   - On admission does not meet sepsis criteria; afebrile, WBC WNL, normal lactate   - RIVP negative on admission   - CT Chest showed branching nodular opacities and patchy airspace opacities noted in the lingula and bilateral lower lobes which are likely infectious in etiology. No pleural effusions are present.   - s/p 1g Rocephin and Azithromycin 500 mg daily x 2 days  - Will continue renally dosed Zosyn  q 12 hrs-Renal Dose Abx with azithromycin 500 mg qd per ID Dr. Garner  - BCx NGTD,   - procalcitonin --> elevated 0.14  CBC in AM.     Problem/Plan - 2: Hypertensive urgency  - On admission /100 --> BP elevated but is improving 2/2 NON Compliance likely.  - Of note patient recently admitted with hypertensive urgency on 07/2021  - d/c labetalol 100 mg BID in the setting of Bradycardia &  worsening renal function. as per Renal ARIELLA 2/2 tight control of BP   - On clonidine 0.2 TID now clonidine 0.2 BID in the setting of Bradycardia & worsening renal function, as per Renal ARIELLA 2/2 tight control of BP .   - will continue hydralazine 100 mg q 8 hrs + amlodipine 10 mg qd   - Will continue with home medications with hold parameters   - Continue to monitor BP.     Problem/Plan - 3: Chronic atrial fibrillation  - Afib   - Patient presents with new onset Afib, however pt's PCP prescribed digoxin, Eliquis by PCP on sept 14/21  - EKG on admission showed Afib @ 64 bpm, incompleta right BBB, LVH   - overnight per remote tele patient demonstrated signs of first degree heart block confirmed by EKG although pt was asymptomatic throughout  - Last TTE on 7/21/21 demonstrate normal left ventricular systolic function, estimated LVEF of 60%.LV diastolic dysfunction.  - On , Eliquis will continue, STOP digoxin- level 0.6  - On labetalol- will hold   - Will continue with Eliquis 5mg BID   - BMP in am  - Continuous Tele   - Cardiology Dr Gamboa called by ER, f/u daily recs- Bradycardia.     Problem/Plan - 4: Elevated troponin.   - likely demand ischemia in setting of hypertensive urgency   - Troponin trended to peak  - NOT ACS per cardiology Dr. Gamboa.     Problem/Plan - 5: Stage 3 chronic kidney disease.   - Acute ARIELLA on CKD 3-4  - improving renal function this am;  3.2<-- 3.5 <-- 3.1 <--2.0 <--2.30 <--1.60  - HOLD Home Lasix   - Avoid nephrotoxic medications  - BMP in AM   - Dr. Cayden brown following, recs appreciated - check bladder scan, c3 c4 complement and dsdna  -As per Renal increase Cr is likely 2/2 tight control of BP- Will STOP BB, Lower dose of Clonidine 2/2 also Bradycardia.     Problem/Plan - 6: Anemia of chronic disease  - H/H 10.1/32 on admission, baseline hemoglobin 8.0-9.0  - Currently hemodynamically stable, likely related to CKD   - Iron studies on 07/21 demonstrated iron deficiency anemia   - F/u AM CBC, Follow w /up R/O -Plasma cell disorder  -D/W Dr Rob.     Problem/Plan - 7: Chronic diastolic congestive heart failure  - Patient presenting with worsening dyspnea on rest and exertion   - Appears euvolemic on exam today  - Last TTE on 07/21  Last TTE on 7/21/21 demonstrate normal left ventricular systolic function, estimated LVEF of 60%.LV diastolic dysfunction.  - On admission Pro BNP 8061 elevated compare to previous one on July 4140  - s/p IVF 2 lt bolus in the ED   - On Lasix 20 mg BID, will continue w/ renal function monitor   - Strict I&O's.     Problem/Plan - 8: Abnormal CT scan of lung  - Abnormal CT finding   -CT chest on admission showed Multiple heterogeneous thyroid nodules noted bilaterally  - Will check TSH   - Recommended to follow up outpatient.     Problem/Plan - 9: CAD (coronary artery disease)   - Elevated troponin in setting of hypertensive urgency  - trops trended to peak - ACS RULED OUT  - On ASA continue   - Will d/c fenofibrate in the setting of worsening renal function   - Monitor and replete lytes, keep K>4, Mg>2.     Problem/Plan - 10: Depression, major  - Chronic   - On Mirtazapine, venlafaxin, Lamictal, will continue.     Problem/Plan - 11: DM (diabetes mellitus), type 2  - History of T2DM   - last A1C on 07/21 6.1  - Off of meds   - Insulin sliding scale  - Accu checks w/ hypoglycemic protocol.     Problem/Plan - 12: DVT prophylaxis.   - Need for prophylaxis measures   DVT ppx: improve score 1. Continue home Eliquis 5mg BID   IMPROVE VTE Individual Risk Assessment  RISK                                                                Points  [  ] Previous VTE                                                  3  [  ] Thrombophilia                                               2  [  ] Lower limb paralysis                                      2        (unable to hold up >15 seconds)    [  ] Current Cancer                                              2         (within 6 months)  [  ] Immobilization > 24 hrs                                1  [  ] ICU/CCU stay > 24 hours                              1  [x  ] Age > 60                                                      1  IMPROVE VTE Score _______1__.   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) living in a group home [facility] house presenting to [facility] today with multiple concerns including subjective fevers, SOB, weakness, and brief episodes of palpitations, and urinary frequency "for at least 3 days". Patient admitted for hypertensive urgency, Afib and PNA.

## 2021-10-12 NOTE — PROGRESS NOTE ADULT - PROBLEM SELECTOR PLAN 5
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 Cayden brown following, recs appreciated - check bladder scan, c3 c4 complement and dsdna  -As per Renal increase Cr is likely 2/2 tight control of BP- Will STOP BB, Lower dose of Clonidine 2/2 also Bradycardia Acute ARIELLA on CKD 3-4  - improving renal function this am;  3.2<-- 3.5 <-- 3.1 <--2.0 <--2.30 <--1.60  - HOLD Home Lasix   - Avoid nephrotoxic medications  - BMP in AM   - Dr. Cayden brown following, recs appreciated - check bladder scan, c3 c4 complement and dsdna  -As per Renal increase Cr is likely 2/2 tight control of BP- Will STOP BB, Lower dose of Clonidine 2/2 also Bradycardia  - K this AM 3.4- repleted with 40KCL x 1

## 2021-10-12 NOTE — BH CONSULTATION LIAISON ASSESSMENT NOTE - HPI (INCLUDE ILLNESS QUALITY, SEVERITY, DURATION, TIMING, CONTEXT, MODIFYING FACTORS, ASSOCIATED SIGNS AND SYMPTOMS)
Patient seen, evaluated and chart reviewed. Patient is a 75 y/o SWM, with one prior psychiatric hospitalization, history of mood disorder, on  with PMHx of Type 2 DM (not on insulin), BPH, CAD s/p stents >4 years ago (not on plavix), diverticulitis (hospitalized 07/2020 at [facility]), GIB 2/2 diverticulosis (2020), anxiety, Lupus, HTN, HLD, CKD stage 3, HepC, 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 house presenting to [facility] today with multiple concerns including subjective fevers, SOB, weakness, and brief episodes of palpitations, and urinary frequency "for at least 3 days". Pt states he would intermittently feel chills and feverish, recorded no temps at group home since last couple of days, he states progressively worsening of dyspnea on exertion and rest, patient unable to walk short distances or climb stairs due to dyspnea, however denies orthopnea, cough, sputum production, night sweats. Patient mentioned some dizziness associated with lower extremities weakness, usually ambulates independently but now feels unable to hold his weight. Regarding palpitations, pt states for 3 days his heart would skip a beat, return to normal. Self limited episodes under a minute, no associated chest pain. Pt also describes urinary frequency beyond baseline, denies dysuria or incontinence, hematuria, constipation. Patient reports history of mood disorder treated by [facility] team. Currently calm and cooperative, denies symptoms of psychosis, irineo or depression.

## 2021-10-12 NOTE — PROGRESS NOTE ADULT - SUBJECTIVE AND OBJECTIVE BOX
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 house presenting to [facility] today with multiple concerns including subjective fevers, SOB, weakness, and brief episodes of palpitations, and urinary frequency "for at least 3 days". Pt states he would intermittently feel chills and feverish, recorded no temps at group home since last couple of days, he states progressively worsening of dyspnea on exertion and rest, patient unable to walk short distances or climb stairs due to dyspnea, however denies orthopnea, cough, sputum production, night sweats. Patient mentioned some dizziness associated with lower extremities weakness, usually ambulates independently but now feels unable to hold his weight. Regarding palpitations, pt states for 3 days his heart would skip a beat, return to normal. Self limited episodes under a minute, no associated chest pain. Pt also describes urinary frequency beyond baseline, denies dysuria or incontinence, hematuria, constipation.     ED course:   VS: Afebrile, HR: 80 BP: 220/100->235/116-> 189/85, Spo2: 98 on NC RR: 20  Labs significant for low stable hemoglobin, BUN/creatinine: 36/2.30, Trop 0.149, Pro BMP 8061.   EKG on admission showed Afib @ 64 bpm, incompleta right BBB, LVH  CT head shows no  evidence of acute intracranial hemorrhage, midline shift or CT evidence of acute territorial infarct.  CT chest A/P demonstrated Multilobar pneumonia. Mild cardiomegaly. No mall bowel obstruction or active bowel inflammation.  Patient was given in the ED 10 mg IVP hydralazine 10 mg Labetalol IVP 1x, 2000 cc bolus NS 1x            (07 Oct 2021 01:24)        SUBJECTIVE:      ALLERGIES:  Allergies    No Known Allergies    Intolerances          MEDICATIONS  (STANDING):  amLODIPine   Tablet 10 milliGRAM(s) Oral daily  amoxicillin  500 milliGRAM(s)/clavulanate 1 Tablet(s) Oral two times a day  apixaban 5 milliGRAM(s) Oral every 12 hours  ARIPiprazole 30 milliGRAM(s) Oral daily  aspirin enteric coated 81 milliGRAM(s) Oral daily  budesonide 160 MICROgram(s)/formoterol 4.5 MICROgram(s) Inhaler 2 Puff(s) Inhalation two times a day  cloNIDine 0.2 milliGRAM(s) Oral two times a day  cyanocobalamin 1000 MICROGram(s) Oral daily  dextrose 40% Gel 15 Gram(s) Oral once  dextrose 5%. 1000 milliLiter(s) (50 mL/Hr) IV Continuous <Continuous>  dextrose 50% Injectable 25 Gram(s) IV Push once  doxazosin 4 milliGRAM(s) Oral <User Schedule>  famotidine    Tablet 20 milliGRAM(s) Oral every 48 hours  folic acid 1 milliGRAM(s) Oral daily  glucagon  Injectable 1 milliGRAM(s) IntraMuscular once  hydrALAZINE 100 milliGRAM(s) Oral every 8 hours  insulin lispro (ADMELOG) corrective regimen sliding scale   SubCutaneous three times a day before meals  lamoTRIgine 100 milliGRAM(s) Oral daily  mirtazapine 30 milliGRAM(s) Oral at bedtime  nystatin Powder 1 Application(s) Topical two times a day  oxybutynin 5 milliGRAM(s) Oral four times a day  pantoprazole    Tablet 40 milliGRAM(s) Oral before breakfast  potassium chloride    Tablet ER 40 milliEquivalent(s) Oral once  venlafaxine XR. 150 milliGRAM(s) Oral daily    MEDICATIONS  (PRN):  acetaminophen   Tablet .. 650 milliGRAM(s) Oral every 6 hours PRN Temp greater or equal to 38C (100.4F), Mild Pain (1 - 3)  melatonin 3 milliGRAM(s) Oral at bedtime PRN Insomnia  ondansetron Injectable 4 milliGRAM(s) IV Push every 8 hours PRN Nausea and/or Vomiting  sodium chloride 0.65% Nasal 1 Spray(s) Both Nostrils two times a day PRN Nasal Congestion      REVIEW OF SYSTEMS:  CONSTITUTIONAL: No fever,  RESPIRATORY: No cough, wheezing, shortness of breath  CARDIOVASCULAR: No chest pain, dyspnea, palpitations, dizziness, syncope, paroxysmal nocturnal dyspnea, orthopnea, or arm or leg swelling  GASTROINTESTINAL: No abdominal  or epigastric pain, nausea, vomiting,  diarrhea  NEUROLOGICAL: No headaches,  loss of strength, numbness, or tremors    Vital Signs Last 24 Hrs  T(C): 36.7 (12 Oct 2021 11:55), Max: 36.7 (12 Oct 2021 11:55)  T(F): 98.1 (12 Oct 2021 11:55), Max: 98.1 (12 Oct 2021 11:55)  HR: 70 (12 Oct 2021 11:55) (59 - 70)  BP: 156/74 (12 Oct 2021 11:55) (150/65 - 177/81)  BP(mean): --  RR: 18 (12 Oct 2021 11:55) (18 - 19)  SpO2: 93% (12 Oct 2021 11:55) (93% - 97%)    PHYSICAL EXAM:  HEAD:  Atraumatic, Normocephalic  NECK: Supple and normal thyroid.  No JVD or carotid bruit.   HEART: S1, S2 regular , 1/6 soft ejection systolic murmur in mitral area , no thrill and no gallops .  PULMONARY: Bilateral vesicular breathing , few scattered ronchi and few scattered rales are present .  ABDOMEN: Soft nontender and positive bowl sounds   EXTREMITIES:  No clubbing, cyanosis, or pedal  edema  NEUROLOGICAL: AAOX3 , no focal deficit .    LABS:                        8.0    5.77  )-----------( 248      ( 12 Oct 2021 07:27 )             26.5     10-12    140  |  110<H>  |  39<H>  ----------------------------<  102<H>  3.4<L>   |  23  |  2.80<H>    Ca    8.7      12 Oct 2021 07:27    TPro  6.4  /  Alb  2.7<L>  /  TBili  0.2  /  DBili  x   /  AST  11<L>  /  ALT  18  /  AlkPhos  36<L>  10-12            BNP      EKG:  ECHO:  IMAGING:    Assessment/Plan    Will continue to follow during hospital course and give further recommendations as needed . Thanks for your referral . 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 Pt states he would intermittently feel chills and feverish, recorded no temps at group home since last couple of days, he states progressively worsening of dyspnea on exertion and rest, patient unable to walk short distances or climb stairs due to dyspnea, however denies orthopnea, cough, sputum production, night sweats. Patient mentioned some dizziness associated with lower extremities weakness, usually ambulates independently but now feels unable to hold his weight. Regarding palpitations, pt states for 3 days his heart would skip a beat, return to normal. Self limited episodes under a minute, no associated chest pain. Pt also describes urinary frequency beyond baseline, denies dysuria or incontinence, hematuria, constipation.     ED course:   VS: Afebrile, HR: 80 BP: 220/100->235/116-> 189/85, Spo2: 98 on NC RR: 20  Labs significant for low stable hemoglobin, BUN/creatinine: 36/2.30, Trop 0.149, Pro BMP 8061.   EKG on admission showed Afib @ 64 bpm, incompleta right BBB, LVH  CT head shows no  evidence of acute intracranial hemorrhage, midline shift or CT evidence of acute territorial infarct.  CT chest A/P demonstrated Multilobar pneumonia. Mild cardiomegaly. No mall bowel obstruction or active bowel inflammation.  Patient was given in the ED 10 mg IVP hydralazine 10 mg Labetalol IVP 1x, 2000 cc bolus NS 1x            (07 Oct 2021 01:24)        SUBJECTIVE:      ALLERGIES:  Allergies    No Known Allergies    Intolerances          MEDICATIONS  (STANDING):  amLODIPine   Tablet 10 milliGRAM(s) Oral daily  amoxicillin  500 milliGRAM(s)/clavulanate 1 Tablet(s) Oral two times a day  apixaban 5 milliGRAM(s) Oral every 12 hours  ARIPiprazole 30 milliGRAM(s) Oral daily  aspirin enteric coated 81 milliGRAM(s) Oral daily  budesonide 160 MICROgram(s)/formoterol 4.5 MICROgram(s) Inhaler 2 Puff(s) Inhalation two times a day  cloNIDine 0.2 milliGRAM(s) Oral two times a day  cyanocobalamin 1000 MICROGram(s) Oral daily  dextrose 40% Gel 15 Gram(s) Oral once  dextrose 5%. 1000 milliLiter(s) (50 mL/Hr) IV Continuous <Continuous>  dextrose 50% Injectable 25 Gram(s) IV Push once  doxazosin 4 milliGRAM(s) Oral <User Schedule>  famotidine    Tablet 20 milliGRAM(s) Oral every 48 hours  folic acid 1 milliGRAM(s) Oral daily  glucagon  Injectable 1 milliGRAM(s) IntraMuscular once  hydrALAZINE 100 milliGRAM(s) Oral every 8 hours  insulin lispro (ADMELOG) corrective regimen sliding scale   SubCutaneous three times a day before meals  lamoTRIgine 100 milliGRAM(s) Oral daily  mirtazapine 30 milliGRAM(s) Oral at bedtime  nystatin Powder 1 Application(s) Topical two times a day  oxybutynin 5 milliGRAM(s) Oral four times a day  pantoprazole    Tablet 40 milliGRAM(s) Oral before breakfast  potassium chloride    Tablet ER 40 milliEquivalent(s) Oral once  venlafaxine XR. 150 milliGRAM(s) Oral daily    MEDICATIONS  (PRN):  acetaminophen   Tablet .. 650 milliGRAM(s) Oral every 6 hours PRN Temp greater or equal to 38C (100.4F), Mild Pain (1 - 3)  melatonin 3 milliGRAM(s) Oral at bedtime PRN Insomnia  ondansetron Injectable 4 milliGRAM(s) IV Push every 8 hours PRN Nausea and/or Vomiting  sodium chloride 0.65% Nasal 1 Spray(s) Both Nostrils two times a day PRN Nasal Congestion      REVIEW OF SYSTEMS:  CONSTITUTIONAL: No fever,  RESPIRATORY: No cough, wheezing, shortness of breath  CARDIOVASCULAR: No chest pain, dyspnea, palpitations, dizziness, syncope, paroxysmal nocturnal dyspnea, orthopnea, or arm or leg swelling  GASTROINTESTINAL: No abdominal  or epigastric pain, nausea, vomiting,  diarrhea  NEUROLOGICAL: No headaches,  loss of strength, numbness, or tremors    Vital Signs Last 24 Hrs  T(C): 36.7 (12 Oct 2021 11:55), Max: 36.7 (12 Oct 2021 11:55)  T(F): 98.1 (12 Oct 2021 11:55), Max: 98.1 (12 Oct 2021 11:55)  HR: 70 (12 Oct 2021 11:55) (59 - 70)  BP: 156/74 (12 Oct 2021 11:55) (150/65 - 177/81)  BP(mean): --  RR: 18 (12 Oct 2021 11:55) (18 - 19)  SpO2: 93% (12 Oct 2021 11:55) (93% - 97%)    PHYSICAL EXAM:  HEAD:  Atraumatic, Normocephalic  NECK: Supple and normal thyroid.  No JVD or carotid bruit.   HEART: S1, S2 regular , 1/6 soft ejection systolic murmur in mitral area , no thrill and no gallops .  PULMONARY: Bilateral vesicular breathing , few scattered ronchi and few scattered rales are present .  ABDOMEN: Soft nontender and positive bowl sounds   EXTREMITIES:  No clubbing, cyanosis, or pedal  edema  NEUROLOGICAL: AAOX3 , no focal deficit .    LABS:                        8.0    5.77  )-----------( 248      ( 12 Oct 2021 07:27 )             26.5     10-12    140  |  110<H>  |  39<H>  ----------------------------<  102<H>  3.4<L>   |  23  |  2.80<H>    Ca    8.7      12 Oct 2021 07:27    TPro  6.4  /  Alb  2.7<L>  /  TBili  0.2  /  DBili  x   /  AST  11<L>  /  ALT  18  /  AlkPhos  36<L>  10-12          Assessment/Plan  Patient has :  1) Atypical chest pain . Pneumonia R/O sepsis .  2) Mild elevated Troponin I secondary to demand ischemia and not ACS or NON-ST KATERINA as primary event .  3) Paroxysmal atrial  fibrillation and stable   4) Chronic mild diastolic heart failure and stable   5) Chronic anemia .  6) Chronic renal insufficiency .  7) Hypertension and BP intermittently mild elevated   Plan : 1) Monitor hemoglobin and electrolytes 2) I/V antibiotics as per PMD . 3) Rest of medications as such . 4) Prognosis guarded 5) Increase labetalol   Will continue to follow during hospital course and give further recommendations as needed . Thanks for your referral . 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 Pt states he would intermittently feel chills and feverish, recorded no temps at group home since last couple of days, he states progressively worsening of dyspnea on exertion and rest, patient unable to walk short distances or climb stairs due to dyspnea, however denies orthopnea, cough, sputum production, night sweats. Patient mentioned some dizziness associated with lower extremities weakness, usually ambulates independently but now feels unable to hold his weight. Regarding palpitations, pt states for 3 days his heart would skip a beat, return to normal. Self limited episodes under a minute, no associated chest pain. Pt also describes urinary frequency beyond baseline, denies dysuria or incontinence, hematuria, constipation.     ED course:   VS: Afebrile, HR: 80 BP: 220/100->235/116-> 189/85, Spo2: 98 on NC RR: 20  Labs significant for low stable hemoglobin, BUN/creatinine: 36/2.30, Trop 0.149, Pro BMP 8061.   EKG on admission showed Afib @ 64 bpm, incompleta right BBB, LVH  CT head shows no  evidence of acute intracranial hemorrhage, midline shift or CT evidence of acute territorial infarct.  CT chest A/P demonstrated Multilobar pneumonia. Mild cardiomegaly. No mall bowel obstruction or active bowel inflammation.  Patient was given in the ED 10 mg IVP hydralazine 10 mg Labetalol IVP 1x, 2000 cc bolus NS 1x            (07 Oct 2021 01:24)        SUBJECTIVE:      ALLERGIES:  Allergies    No Known Allergies    Intolerances          MEDICATIONS  (STANDING):  amLODIPine   Tablet 10 milliGRAM(s) Oral daily  amoxicillin  500 milliGRAM(s)/clavulanate 1 Tablet(s) Oral two times a day  apixaban 5 milliGRAM(s) Oral every 12 hours  ARIPiprazole 30 milliGRAM(s) Oral daily  aspirin enteric coated 81 milliGRAM(s) Oral daily  budesonide 160 MICROgram(s)/formoterol 4.5 MICROgram(s) Inhaler 2 Puff(s) Inhalation two times a day  cloNIDine 0.2 milliGRAM(s) Oral two times a day  cyanocobalamin 1000 MICROGram(s) Oral daily  dextrose 40% Gel 15 Gram(s) Oral once  dextrose 5%. 1000 milliLiter(s) (50 mL/Hr) IV Continuous <Continuous>  dextrose 50% Injectable 25 Gram(s) IV Push once  doxazosin 4 milliGRAM(s) Oral <User Schedule>  famotidine    Tablet 20 milliGRAM(s) Oral every 48 hours  folic acid 1 milliGRAM(s) Oral daily  glucagon  Injectable 1 milliGRAM(s) IntraMuscular once  hydrALAZINE 100 milliGRAM(s) Oral every 8 hours  insulin lispro (ADMELOG) corrective regimen sliding scale   SubCutaneous three times a day before meals  lamoTRIgine 100 milliGRAM(s) Oral daily  mirtazapine 30 milliGRAM(s) Oral at bedtime  nystatin Powder 1 Application(s) Topical two times a day  oxybutynin 5 milliGRAM(s) Oral four times a day  pantoprazole    Tablet 40 milliGRAM(s) Oral before breakfast  potassium chloride    Tablet ER 40 milliEquivalent(s) Oral once  venlafaxine XR. 150 milliGRAM(s) Oral daily    MEDICATIONS  (PRN):  acetaminophen   Tablet .. 650 milliGRAM(s) Oral every 6 hours PRN Temp greater or equal to 38C (100.4F), Mild Pain (1 - 3)  melatonin 3 milliGRAM(s) Oral at bedtime PRN Insomnia  ondansetron Injectable 4 milliGRAM(s) IV Push every 8 hours PRN Nausea and/or Vomiting  sodium chloride 0.65% Nasal 1 Spray(s) Both Nostrils two times a day PRN Nasal Congestion      REVIEW OF SYSTEMS:  CONSTITUTIONAL: No fever,  RESPIRATORY: No cough, wheezing, shortness of breath  CARDIOVASCULAR: No chest pain, dyspnea, palpitations, dizziness, syncope, paroxysmal nocturnal dyspnea, orthopnea, or arm or leg swelling  GASTROINTESTINAL: No abdominal  or epigastric pain, nausea, vomiting,  diarrhea  NEUROLOGICAL: No headaches,  loss of strength, numbness, or tremors    Vital Signs Last 24 Hrs  T(C): 36.7 (12 Oct 2021 11:55), Max: 36.7 (12 Oct 2021 11:55)  T(F): 98.1 (12 Oct 2021 11:55), Max: 98.1 (12 Oct 2021 11:55)  HR: 70 (12 Oct 2021 11:55) (59 - 70)  BP: 156/74 (12 Oct 2021 11:55) (150/65 - 177/81)  BP(mean): --  RR: 18 (12 Oct 2021 11:55) (18 - 19)  SpO2: 93% (12 Oct 2021 11:55) (93% - 97%)    PHYSICAL EXAM:  HEAD:  Atraumatic, Normocephalic  NECK: Supple and normal thyroid.  No JVD or carotid bruit.   HEART: S1, S2 regular , 1/6 soft ejection systolic murmur in mitral area , no thrill and no gallops .  PULMONARY: Bilateral vesicular breathing , few scattered ronchi and few scattered rales are present .  ABDOMEN: Soft nontender and positive bowl sounds   EXTREMITIES:  No clubbing, cyanosis, or pedal  edema  NEUROLOGICAL: AAOX3 , no focal deficit .    LABS:                        8.0    5.77  )-----------( 248      ( 12 Oct 2021 07:27 )             26.5     10-12    140  |  110<H>  |  39<H>  ----------------------------<  102<H>  3.4<L>   |  23  |  2.80<H>    Ca    8.7      12 Oct 2021 07:27    TPro  6.4  /  Alb  2.7<L>  /  TBili  0.2  /  DBili  x   /  AST  11<L>  /  ALT  18  /  AlkPhos  36<L>  10-12          Assessment/Plan  Patient has :  1) Atypical chest pain . Pneumonia R/O sepsis .  2) Mild elevated Troponin I secondary to demand ischemia and not ACS or NON-ST KATERINA as primary event .  3) Paroxysmal atrial  fibrillation and stable   4) Chronic mild diastolic heart failure and stable   5) Chronic anemia .  6) Chronic renal insufficiency .  7) Hypertension and BP intermittently mild elevated   Plan : 1) Monitor hemoglobin and electrolytes 2) I/V antibiotics as per PMD . 3) Rest of medications as such . 4) Prognosis guarded 5) Increase labetalol   Will continue to follow during hospital course and give further recommendations as needed . Thanks for your referral . 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 Pt states he would intermittently feel chills and feverish, recorded no temps at group home since last couple of days, he states progressively worsening of dyspnea on exertion and rest, patient unable to walk short distances or climb stairs due to dyspnea, however denies orthopnea, cough, sputum production, night sweats. Patient mentioned some dizziness associated with lower extremities weakness, usually ambulates independently but now feels unable to hold his weight. Regarding palpitations, pt states for 3 days his heart would skip a beat, return to normal. Self limited episodes under a minute, no associated chest pain. Pt also describes urinary frequency beyond baseline, denies dysuria or incontinence, hematuria, constipation.     ED course:   VS: Afebrile, HR: 80 BP: 220/100->235/116-> 189/85, Spo2: 98 on NC RR: 20  Labs significant for low stable hemoglobin, BUN/creatinine: 36/2.30, Trop 0.149, Pro BMP 8061.   EKG on admission showed Afib @ 64 bpm, incompleta right BBB, LVH  CT head shows no  evidence of acute intracranial hemorrhage, midline shift or CT evidence of acute territorial infarct.  CT chest A/P demonstrated Multilobar pneumonia. Mild cardiomegaly. No mall bowel obstruction or active bowel inflammation.  Patient was given in the ED 10 mg IVP hydralazine 10 mg Labetalol IVP 1x, 2000 cc bolus NS 1x            (07 Oct 2021 01:24)        SUBJECTIVE:      ALLERGIES:  Allergies    No Known Allergies    Intolerances          MEDICATIONS  (STANDING):  amLODIPine   Tablet 10 milliGRAM(s) Oral daily  amoxicillin  500 milliGRAM(s)/clavulanate 1 Tablet(s) Oral two times a day  apixaban 5 milliGRAM(s) Oral every 12 hours  ARIPiprazole 30 milliGRAM(s) Oral daily  aspirin enteric coated 81 milliGRAM(s) Oral daily  budesonide 160 MICROgram(s)/formoterol 4.5 MICROgram(s) Inhaler 2 Puff(s) Inhalation two times a day  cloNIDine 0.2 milliGRAM(s) Oral two times a day  cyanocobalamin 1000 MICROGram(s) Oral daily  dextrose 40% Gel 15 Gram(s) Oral once  dextrose 5%. 1000 milliLiter(s) (50 mL/Hr) IV Continuous <Continuous>  dextrose 50% Injectable 25 Gram(s) IV Push once  doxazosin 4 milliGRAM(s) Oral <User Schedule>  famotidine    Tablet 20 milliGRAM(s) Oral every 48 hours  folic acid 1 milliGRAM(s) Oral daily  glucagon  Injectable 1 milliGRAM(s) IntraMuscular once  hydrALAZINE 100 milliGRAM(s) Oral every 8 hours  insulin lispro (ADMELOG) corrective regimen sliding scale   SubCutaneous three times a day before meals  lamoTRIgine 100 milliGRAM(s) Oral daily  mirtazapine 30 milliGRAM(s) Oral at bedtime  nystatin Powder 1 Application(s) Topical two times a day  oxybutynin 5 milliGRAM(s) Oral four times a day  pantoprazole    Tablet 40 milliGRAM(s) Oral before breakfast  potassium chloride    Tablet ER 40 milliEquivalent(s) Oral once  venlafaxine XR. 150 milliGRAM(s) Oral daily    MEDICATIONS  (PRN):  acetaminophen   Tablet .. 650 milliGRAM(s) Oral every 6 hours PRN Temp greater or equal to 38C (100.4F), Mild Pain (1 - 3)  melatonin 3 milliGRAM(s) Oral at bedtime PRN Insomnia  ondansetron Injectable 4 milliGRAM(s) IV Push every 8 hours PRN Nausea and/or Vomiting  sodium chloride 0.65% Nasal 1 Spray(s) Both Nostrils two times a day PRN Nasal Congestion      REVIEW OF SYSTEMS:  CONSTITUTIONAL: No fever,  RESPIRATORY: Improvement in  cough, wheezing, shortness of breath  CARDIOVASCULAR: Improvement in  chest pain and  dyspnea, No  palpitations, dizziness, syncope, paroxysmal nocturnal dyspnea, and improvement in SOB and  leg swelling  GASTROINTESTINAL: No abdominal  or epigastric pain, nausea, vomiting,  diarrhea  NEUROLOGICAL: No headaches,  loss of strength, numbness, or tremors  Skin : No itching .  Hematology : No bleeding .  Endocrinology : No heat and cold intolerance .  Psychiatry : Patient is mild anxious .  Musculoskeletal : Patient has mild arthritis .    Vital Signs Last 24 Hrs  T(C): 36.7 (12 Oct 2021 11:55), Max: 36.7 (12 Oct 2021 11:55)  T(F): 98.1 (12 Oct 2021 11:55), Max: 98.1 (12 Oct 2021 11:55)  HR: 70 (12 Oct 2021 11:55) (59 - 70)  BP: 156/74 (12 Oct 2021 11:55) (150/65 - 177/81)  BP(mean): --  RR: 18 (12 Oct 2021 11:55) (18 - 19)  SpO2: 93% (12 Oct 2021 11:55) (93% - 97%)    PHYSICAL EXAM:  HEAD:  Atraumatic, Normocephalic  NECK: Supple and normal thyroid.  No JVD or carotid bruit.   HEART: S1, S2 regular , 1/6 soft ejection systolic murmur in mitral area , no thrill and no gallops .  PULMONARY: Bilateral vesicular breathing , few scattered ronchi and few scattered rales are present .  ABDOMEN: Soft nontender and positive bowl sounds   EXTREMITIES:  No clubbing, cyanosis, or pedal  edema  NEUROLOGICAL: AAOX3 , no focal deficit .    LABS:                        8.0    5.77  )-----------( 248      ( 12 Oct 2021 07:27 )             26.5     10-12    140  |  110<H>  |  39<H>  ----------------------------<  102<H>  3.4<L>   |  23  |  2.80<H>    Ca    8.7      12 Oct 2021 07:27    TPro  6.4  /  Alb  2.7<L>  /  TBili  0.2  /  DBili  x   /  AST  11<L>  /  ALT  18  /  AlkPhos  36<L>  10-12          Assessment/Plan  Patient has :  1) Atypical chest pain . Pneumonia R/O sepsis .  2) Mild elevated Troponin I secondary to demand ischemia and not ACS or NON-ST KATERINA as primary event .  3) Paroxysmal atrial  fibrillation and has symptomatic pause of 3.2 seconds   4) Chronic mild diastolic heart failure and stable   5) Chronic anemia .  6) Chronic renal insufficiency .  7) Hypertension and BP intermittently mild elevated   Plan : 1) Monitor hemoglobin and electrolytes 2) Decrease clonidine due to bradycardia , Patient already off beta blockers and TSH levels are normal and increase doxazosin for better control of BP  3) Rest of medications as such . 4) Prognosis guarded   Will continue to follow during hospital course and give further recommendations as needed . Thanks for your referral . if any questions please contact me at office ([phone] [phone])

## 2021-10-12 NOTE — PROGRESS NOTE ADULT - PROBLEM SELECTOR PLAN 2
On admission /100 --> BP elevated but is improving 2/2 NON Compliance likely.  - Of note patient recently admitted with hypertensive urgency on 07/2021  - d/c labetalol 100 mg BID in the setting of Bradycardia &  worsening renal function. as per Renal ARIELLA 2/2 tight control of BP   - On clonidine 0.2 TID now clonidine 0.2 BID in the setting of Bradycardia & worsening renal function, as per Renal ARIELLA 2/2 tight control of BP .   - will continue hydralazine 100 mg q 8 hrs + amlodipine 10 mg qd   - Will continue with home medications with hold parameters   - Continue to monitor BP

## 2021-10-12 NOTE — PROGRESS NOTE ADULT - ASSESSMENT
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 Patient admitted for hypertensive urgency, Afib and PNA.     ID f/u noted  curr on Zithro - Zosyn ABX rx regimen  HEME Onc eval noted - H and H reviewed - Labs reviewed -       pna eval   ID eval noted  biomarkers - cx -   on emp ABX  TTE and CT chest reviewed  LABS reviewed  pt with CKD - Anemia -   monitor VS and Sat - wean o2 as tolerated - keep sat > 88 pct  [facility] rx regimen and BP control - pt with known HTN - AF - CAD -   DM care - serial FS -   does not tolerate CPAP -

## 2021-10-12 NOTE — PROGRESS NOTE ADULT - ASSESSMENT
ARIELLA, CKD 4  Diabetes  Pneumonia  Hypertension  SLE  Hypokalemia  Anemia    Renal indices improving. Potassium supplementation. To continue current meds. On IV abx. Monitor blood sugar levels. Insulin coverage as needed. Monitor h/h trend. Monitor BP trend. Titrate BP meds as needed. Salt restriction.   Will follow electrolytes and renal function trend.

## 2021-10-12 NOTE — BH CONSULTATION LIAISON ASSESSMENT NOTE - CURRENT MEDICATION
MEDICATIONS  (STANDING):  amLODIPine   Tablet 10 milliGRAM(s) Oral daily  apixaban 5 milliGRAM(s) Oral every 12 hours  ARIPiprazole 30 milliGRAM(s) Oral daily  aspirin enteric coated 81 milliGRAM(s) Oral daily  budesonide 160 MICROgram(s)/formoterol 4.5 MICROgram(s) Inhaler 2 Puff(s) Inhalation two times a day  cloNIDine 0.2 milliGRAM(s) Oral two times a day  cyanocobalamin 1000 MICROGram(s) Oral daily  dextrose 40% Gel 15 Gram(s) Oral once  dextrose 5%. 1000 milliLiter(s) (50 mL/Hr) IV Continuous <Continuous>  dextrose 50% Injectable 25 Gram(s) IV Push once  doxazosin 4 milliGRAM(s) Oral <User Schedule>  famotidine    Tablet 20 milliGRAM(s) Oral every 48 hours  folic acid 1 milliGRAM(s) Oral daily  glucagon  Injectable 1 milliGRAM(s) IntraMuscular once  hydrALAZINE 100 milliGRAM(s) Oral every 8 hours  insulin lispro (ADMELOG) corrective regimen sliding scale   SubCutaneous three times a day before meals  lamoTRIgine 100 milliGRAM(s) Oral daily  mirtazapine 30 milliGRAM(s) Oral at bedtime  nystatin Powder 1 Application(s) Topical two times a day  oxybutynin 5 milliGRAM(s) Oral four times a day  pantoprazole    Tablet 40 milliGRAM(s) Oral before breakfast  piperacillin/tazobactam IVPB.. 3.375 Gram(s) IV Intermittent every 12 hours  potassium chloride    Tablet ER 40 milliEquivalent(s) Oral once  venlafaxine XR. 150 milliGRAM(s) Oral daily    MEDICATIONS  (PRN):  acetaminophen   Tablet .. 650 milliGRAM(s) Oral every 6 hours PRN Temp greater or equal to 38C (100.4F), Mild Pain (1 - 3)  melatonin 3 milliGRAM(s) Oral at bedtime PRN Insomnia  ondansetron Injectable 4 milliGRAM(s) IV Push every 8 hours PRN Nausea and/or Vomiting  sodium chloride 0.65% Nasal 1 Spray(s) Both Nostrils two times a day PRN Nasal Congestion

## 2021-10-12 NOTE — CONSULT NOTE ADULT - REASON FOR ADMISSION
multilobar PNA, afib
multilobar PNA, afib
chest pain
chest pain
multilobar PNA, afib

## 2021-10-12 NOTE — CONSULT NOTE ADULT - SUBJECTIVE AND OBJECTIVE BOX
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 Pt states he would intermittently feel chills and feverish, recorded no temps at group home since last couple of days, he states progressively worsening of dyspnea on exertion and rest, patient unable to walk short distances or climb stairs due to dyspnea, however denies orthopnea, cough, sputum production, night sweats. Patient mentioned some dizziness associated with lower extremities weakness, usually ambulates independently but now feels unable to hold his weight. Regarding palpitations, pt states for 3 days his heart would skip a beat, return to normal. Self limited episodes under a minute, no associated chest pain. Pt also describes urinary frequency beyond baseline, denies dysuria or incontinence, hematuria, constipation.     ED course:   VS: Afebrile, HR: 80 BP: 220/100->235/116-> 189/85, Spo2: 98 on NC RR: 20  Labs significant for low stable hemoglobin, BUN/creatinine: 36/2.30, Trop 0.149, Pro BMP 8061.   EKG on admission showed Afib @ 64 bpm, incompleta right BBB, LVH  CT head shows no  evidence of acute intracranial hemorrhage, midline shift or CT evidence of acute territorial infarct.  CT chest A/P demonstrated Multilobar pneumonia. Mild cardiomegaly. No mall bowel obstruction or active bowel inflammation.  Patient was given in the ED 10 mg IVP hydralazine 10 mg Labetalol IVP 1x, 2000 cc bolus NS 1x            (07 Oct 2021 01:24)      PAST MEDICAL & SURGICAL HISTORY:  HTN (hypertension)  c/b multiple episodes of hypertensive urgency    HLD (hyperlipidemia)    Atrial fibrillation  first documented on EKG 10/7/2021    CAD (coronary artery disease)  s/p stents (not on plavix)    Type 2 diabetes mellitus  not on home insulin    Anxiety  multiple psych medications    History of diverticulitis  07/2021    Diverticulosis  c/b GIB in 2020    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  Had surgery ( April 2013 )    S/P tonsillectomy    S/P arthroscopic knee surgery  Bilateral ( 2005 )    Torsion of testicle  Had surgery at age 14    Pilonidal cyst  Had surgery ( 1969 )    S/P cataract surgery  Bilateral    H/O hernia repair        FAMILY HISTORY:  FHx: throat cancer    No family history of colorectal cancer          Social History:    MEDICATIONS  (STANDING):  amLODIPine   Tablet 10 milliGRAM(s) Oral daily  apixaban 5 milliGRAM(s) Oral every 12 hours  ARIPiprazole 30 milliGRAM(s) Oral daily  aspirin enteric coated 81 milliGRAM(s) Oral daily  azithromycin   Tablet 500 milliGRAM(s) Oral daily  budesonide 160 MICROgram(s)/formoterol 4.5 MICROgram(s) Inhaler 2 Puff(s) Inhalation two times a day  cloNIDine 0.2 milliGRAM(s) Oral two times a day  cyanocobalamin 1000 MICROGram(s) Oral daily  dextrose 40% Gel 15 Gram(s) Oral once  dextrose 5%. 1000 milliLiter(s) (50 mL/Hr) IV Continuous <Continuous>  dextrose 50% Injectable 25 Gram(s) IV Push once  doxazosin 4 milliGRAM(s) Oral <User Schedule>  famotidine    Tablet 20 milliGRAM(s) Oral every 48 hours  folic acid 1 milliGRAM(s) Oral daily  glucagon  Injectable 1 milliGRAM(s) IntraMuscular once  hydrALAZINE 100 milliGRAM(s) Oral every 8 hours  insulin lispro (ADMELOG) corrective regimen sliding scale   SubCutaneous three times a day before meals  lamoTRIgine 100 milliGRAM(s) Oral daily  mirtazapine 30 milliGRAM(s) Oral at bedtime  nystatin Powder 1 Application(s) Topical two times a day  oxybutynin 5 milliGRAM(s) Oral four times a day  pantoprazole    Tablet 40 milliGRAM(s) Oral before breakfast  piperacillin/tazobactam IVPB.. 3.375 Gram(s) IV Intermittent every 12 hours  venlafaxine XR. 150 milliGRAM(s) Oral daily    MEDICATIONS  (PRN):  acetaminophen   Tablet .. 650 milliGRAM(s) Oral every 6 hours PRN Temp greater or equal to 38C (100.4F), Mild Pain (1 - 3)  melatonin 3 milliGRAM(s) Oral at bedtime PRN Insomnia  ondansetron Injectable 4 milliGRAM(s) IV Push every 8 hours PRN Nausea and/or Vomiting  sodium chloride 0.65% Nasal 1 Spray(s) Both Nostrils two times a day PRN Nasal Congestion        T(C): 36.4 (10-12-21 @ 04:25), Max: 36.7 (10-11-21 @ 11:59)  HR: 65 (10-12-21 @ 04:25) (59 - 75)  BP: 177/81 (10-12-21 @ 04:25) (143/67 - 177/81)  RR: 18 (10-12-21 @ 04:25) (18 - 19)  SpO2: 94% (10-12-21 @ 04:25) (89% - 97%)  Wt(kg): --    PHYSICAL EXAM:  GENERAL: NAD, well-groomed, well-developed  HEAD:  Atraumatic, Normocephalic  NECK: Supple, No JVD, Normal thyroid  CHEST/LUNG: Clear to percussion bilaterally; No rales, rhonchi, wheezing, or rubs  HEART: Regular rate and rhythm; No murmurs, rubs, or gallops  ABDOMEN: Soft, Nontender, Nondistended; Bowel sounds present  EXTREMITIES:  2+ Peripheral Pulses, No clubbing, cyanosis, or edema  SKIN: No rashes or lesions    CAPILLARY BLOOD GLUCOSE      POCT Blood Glucose.: 115 mg/dL (12 Oct 2021 07:51)  POCT Blood Glucose.: 146 mg/dL (11 Oct 2021 21:54)  POCT Blood Glucose.: 133 mg/dL (11 Oct 2021 16:49)  POCT Blood Glucose.: 138 mg/dL (11 Oct 2021 11:55)                            8.0    5.77  )-----------( 248      ( 12 Oct 2021 07:27 )             26.5       CMP:  10-12 @ 07:27  SGPT 18  Albumin 2.7   Alk Phos 36   Anion Gap 7   SGOT 11   Total Bili 0.2   BUN 39   Calcium Total 8.7   CO2 23   Chloride 110   Creatinine 2.80   eGFR if AA 24   eGFR if non AA 21   Glucose 102   Potassium 3.4   Protein 6.4   Sodium 140      Thyroid Function Tests:      Diabetes Tests:       Radiology:

## 2021-10-12 NOTE — PROGRESS NOTE ADULT - SUBJECTIVE AND OBJECTIVE BOX
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 Pt states he would intermittently feel chills and feverish, recorded no temps at group home since last couple of days, he states progressively worsening of dyspnea on exertion and rest, patient unable to walk short distances or climb stairs due to dyspnea, however denies orthopnea, cough, sputum production, night sweats. Patient mentioned some dizziness associated with lower extremities weakness, usually ambulates independently but now feels unable to hold his weight. Regarding palpitations, pt states for 3 days his heart would skip a beat, return to normal. Self limited episodes under a minute, no associated chest pain. Pt also describes urinary frequency beyond baseline, denies dysuria or incontinence, hematuria, constipation.     ED course:   VS: Afebrile, HR: 80 BP: 220/100->235/116-> 189/85, Spo2: 98 on NC RR: 20  Labs significant for low stable hemoglobin, BUN/creatinine: 36/2.30, Trop 0.149, Pro BMP 8061.   EKG on admission showed Afib @ 64 bpm, incompleta right BBB, LVH  CT head shows no  evidence of acute intracranial hemorrhage, midline shift or CT evidence of acute territorial infarct.  CT chest A/P demonstrated Multilobar pneumonia. Mild cardiomegaly. No mall bowel obstruction or active bowel inflammation.  Patient was given in the ED 10 mg IVP hydralazine 10 mg Labetalol IVP 1x, 2000 cc bolus NS 1x            (07 Oct 2021 01:24)    INTERVAL HPI:   10/07/21: Pt seen and examined at bedside; in no acute distress; complains of intermittent palpitation and transient chest pain. On Zosyn 3.375gm q8h + azithromycin 500mg daily; concern for medication compliance; ??capacity. will consult psych. K replaced  10/8/21: Pt seen and examined at bedside. Pt is lethargic and is sleeping although answers with yes and no questions. He has no acute complaints. Denies fevers, chills, chest pain, SOB, abdominal pain, n/v/d/c. On Zosyn 3.375gm q8h + azithromycin 500mg daily. Blood pressure improving. PT recommended LETI yesterday.  10/9/21: Pt seen and examined at bedside. Pt with no complaints this am. Worsening Cr function this am; Nephro on board. Will continue to monitor.   10/10/21: Pt seen and examined at bedside with no complaints this am; On renally dosed zosyn; Add Zithromax, improving renal function this am; pending psych consultation with Dr Winkler.  10/11/21: Patient was seen and examined at bedside. Patient did not want to provide history and wanted to continue sleeping. Patient states he is very sleepy and wants rest. On IV Zosyn /Po Zithromax   10/12/21: Patient was seen and examined at bedside.     OVERNIGHT EVENTS: No overnight events     Home Medications:  cloNIDine 0.1 mg oral tablet: 2 tab(s) orally 3 times a day (07 Oct 2021 03:41)  digoxin 125 mcg (0.125 mg) oral tablet: 1 tab(s) orally once a day (07 Oct 2021 03:41)  Eliquis 5 mg oral tablet: 1 tab(s) orally 2 times a day (07 Oct 2021 03:41)  famotidine 40 mg oral tablet: 1 tab(s) orally once a day (at bedtime) (07 Oct 2021 03:41)  fenofibrate 145 mg oral tablet: 1 tab(s) orally once a day (07 Oct 2021 03:41)  labetalol 100 mg oral tablet: 1 tab(s) orally 2 times a day (07 Oct 2021 03:41)  Lasix 20 mg oral tablet: 1 tab(s) orally 2 times a day (07 Oct 2021 03:41)  oxybutynin 5 mg oral tablet: 1 tab(s) orally 4 times a day (07 Oct 2021 03:41)  pantoprazole 40 mg oral delayed release tablet: 1 tab(s) orally once a day (before a meal) (07 Oct 2021 03:41)  Symbicort 160 mcg-4.5 mcg/inh inhalation aerosol: 2 puff(s) inhaled 2 times a day (07 Oct 2021 03:41)      MEDICATIONS  (STANDING):  amLODIPine   Tablet 10 milliGRAM(s) Oral daily  apixaban 5 milliGRAM(s) Oral every 12 hours  ARIPiprazole 30 milliGRAM(s) Oral daily  aspirin enteric coated 81 milliGRAM(s) Oral daily  budesonide 160 MICROgram(s)/formoterol 4.5 MICROgram(s) Inhaler 2 Puff(s) Inhalation two times a day  cloNIDine 0.2 milliGRAM(s) Oral two times a day  cyanocobalamin 1000 MICROGram(s) Oral daily  dextrose 40% Gel 15 Gram(s) Oral once  dextrose 5%. 1000 milliLiter(s) (50 mL/Hr) IV Continuous <Continuous>  dextrose 50% Injectable 25 Gram(s) IV Push once  doxazosin 4 milliGRAM(s) Oral <User Schedule>  famotidine    Tablet 20 milliGRAM(s) Oral every 48 hours  folic acid 1 milliGRAM(s) Oral daily  glucagon  Injectable 1 milliGRAM(s) IntraMuscular once  hydrALAZINE 100 milliGRAM(s) Oral every 8 hours  insulin lispro (ADMELOG) corrective regimen sliding scale   SubCutaneous three times a day before meals  lamoTRIgine 100 milliGRAM(s) Oral daily  mirtazapine 30 milliGRAM(s) Oral at bedtime  nystatin Powder 1 Application(s) Topical two times a day  oxybutynin 5 milliGRAM(s) Oral four times a day  pantoprazole    Tablet 40 milliGRAM(s) Oral before breakfast  piperacillin/tazobactam IVPB.. 3.375 Gram(s) IV Intermittent every 12 hours  potassium chloride    Tablet ER 40 milliEquivalent(s) Oral once  venlafaxine XR. 150 milliGRAM(s) Oral daily    MEDICATIONS  (PRN):  acetaminophen   Tablet .. 650 milliGRAM(s) Oral every 6 hours PRN Temp greater or equal to 38C (100.4F), Mild Pain (1 - 3)  melatonin 3 milliGRAM(s) Oral at bedtime PRN Insomnia  ondansetron Injectable 4 milliGRAM(s) IV Push every 8 hours PRN Nausea and/or Vomiting  sodium chloride 0.65% Nasal 1 Spray(s) Both Nostrils two times a day PRN Nasal Congestion      Allergies    No Known Allergies    Intolerances        Social History:  Tobacco: quit in 1980, not active smoker  EtOH: denies   Recreational drug use: cocaine several years ago "a few times" has not used in "many years"  Lives with: [facility]; group home; no nursing assistance; observation is there  Ambulates: independent, denies walker or cane but uses guard railings  ADLs: independent, but states need for assistance in bathing, cooking; independent with feeding, ambulating  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[place]  Vaccinations: Moderna x2 doses last dose in May (07 Oct 2021 01:24)      REVIEW OF SYSTEMS:  CONSTITUTIONAL: No fever, No chills, No fatigue, No myalgia, No Body ache, No Weakness  EYES: No eye pain,  No visual disturbances, No discharge, NO Redness  ENMT:  No ear pain, No nose bleed, No vertigo; No sinus pain, NO throat pain, No Congestion  NECK: No pain, No stiffness  RESPIRATORY: No cough, NO wheezing, No  hemoptysis, NO  shortness of breath  CARDIOVASCULAR: No chest pain, palpitations  GASTROINTESTINAL: No abdominal pain, NO epigastric pain. No nausea, No vomiting; No diarrhea, No constipation. [  ] BM  GENITOURINARY: No dysuria, No frequency, No urgency, No hematuria, NO incontinence  NEUROLOGICAL: No headaches, No dizziness, No numbness, No tingling, No tremors, No weakness  EXT: No Swelling, No Pain, No Edema  SKIN:  [  ] No itching, burning, rashes, or lesions   MUSCULOSKELETAL: No joint pain ,No Jt swelling; No muscle pain, No back pain, No extremity pain  PSYCHIATRIC: No depression,  No anxiety,  No mood swings ,No difficulty sleeping at night  PAIN SCALE: [  ] None  [  ] Other-  ROS Unable to obtain due to - [  ] Dementia  [  ] Lethargy [ X ] Drowsy [  ] Sedated [  ] non verbal  REST OF REVIEW Of SYSTEM - [  ] Normal     Vital Signs Last 24 Hrs  T(C): 36.7 (12 Oct 2021 11:55), Max: 36.7 (12 Oct 2021 11:55)  T(F): 98.1 (12 Oct 2021 11:55), Max: 98.1 (12 Oct 2021 11:55)  HR: 70 (12 Oct 2021 11:55) (59 - 70)  BP: 156/74 (12 Oct 2021 11:55) (150/65 - 177/81)  BP(mean): --  RR: 18 (12 Oct 2021 11:55) (18 - 19)  SpO2: 93% (12 Oct 2021 11:55) (93% - 97%)  Finger Stick          PHYSICAL EXAM:  GENERAL:  [ x ] NAD , [  ] well appearing, [  ] Agitated, [ x ] Drowsy,  [  ] Lethargy, [  ] confused   HEAD:  [x  ] Normal, [  ] Other  EYES:  [ x ] EOMI, [ ] PERRLA, [ x ] conjunctiva and sclera clear normal, [  ] Other,  [  ] Pallor,[  ] Discharge  ENMT:  [  ] Normal, [ x ] Moist mucous membranes, [x  ] Good dentition, [  ] No Thrush  NECK:  [x ] Supple, [x  ] No JVD, [  ] Normal thyroid, [  ] Lymphadenopathy [  ] Other  CHEST/LUNG:  [ x ] Clear to auscultation bilaterally, [x  ] Breath Sounds equal B/L / Decrease, [  ] poor effort  [x  ] No rales, [x  ] No rhonchi  [ x ]  No wheezing,   HEART:  [ x ] Regular rate and rhythm, [  ] tachycardia, [  ] Bradycardia,  [  ] irregular  [ x ] No murmurs, No rubs, No gallops, [  ] PPM in place (Mfr:  )  ABDOMEN:  [ x ] Soft, [ x ] Nontender, [ x ] Nondistended, [  ] No mass, [x  ] Bowel sounds present, [  ] obese  NERVOUS SYSTEM:  [  ] Alert & Oriented X3, [  ] Nonfocal  [  ] Confusion  [  ] Encephalopathic [  ] Sedated [ x ] Unable to assess, [  ] Dementia [  ] Other-  EXTREMITIES: [x  ] 2+ Peripheral Pulses, No clubbing, No cyanosis,  [  ] edema B/L lower EXT. [  ] PVD stasis skin changes B/L Lower EXT, [  ] wound  LYMPH: No lymphadenopathy noted  SKIN:  [ x ] No rashes or lesions, [  ] Pressure Ulcers, [x  ] ecchymosis, [  ] Skin Tears, [  ] Other    DIET: Diet, Consistent Carbohydrate Renal w/Evening Snack:   DASH/TLC Sodium & Cholesterol Restricted (10-07-21 @ 02:46)      LABS:                        8.0    5.77  )-----------( 248      ( 12 Oct 2021 07:27 )             26.5     12 Oct 2021 07:27    140    |  110    |  39     ----------------------------<  102    3.4     |  23     |  2.80     Ca    8.7        12 Oct 2021 07:27    TPro  6.4    /  Alb  2.7    /  TBili  0.2    /  DBili  x      /  AST  11     /  ALT  18     /  AlkPhos  36     12 Oct 2021 07:27          Culture Results:   No Growth Final (10-07 @ 03:57)  Culture Results:   No Growth Final (10-07 @ 03:57)  Culture Results:   <10,000 CFU/mL Normal Urogenital Johnna (10-07 @ 02:42)          CARDIAC MARKERS ( 07 Oct 2021 14:20 )  .112 ng/mL / x     / x     / x     / x      CARDIAC MARKERS ( 07 Oct 2021 04:23 )  .145 ng/mL / x     / 106 U/L / x     / 2.2 ng/mL  CARDIAC MARKERS ( 06 Oct 2021 21:51 )  .149 ng/mL / x     / x     / x     / x              Culture - Blood (collected 07 Oct 2021 03:57)  Source: .Blood Blood-Peripheral  Final Report (12 Oct 2021 04:00):    No Growth Final    Culture - Blood (collected 07 Oct 2021 03:57)  Source: .Blood Blood-Peripheral  Final Report (12 Oct 2021 04:00):    No Growth Final    Culture - Urine (collected 07 Oct 2021 02:42)  Source: Clean Catch Clean Catch (Midstream)  Final Report (08 Oct 2021 12:49):    <10,000 CFU/mL Normal Urogenital Johnna         Anemia Panel:  Iron Total, Serum: 28 ug/dL (10-08-21 @ 22:27)  Iron - Total Binding Capacity.: 337 ug/dL (10-08-21 @ 22:27)  Vitamin B12, Serum: 413 pg/mL (10-08-21 @ 22:26)  Folate, Serum: 9.9 ng/mL (10-08-21 @ 22:26)  Ferritin, Serum: 28 ng/mL (10-08-21 @ 22:26)  Absolute Reticulocytes: 49.4 K/uL (10-08-21 @ 16:57)      Thyroid Panel:  Thyroid Stimulating Hormone, Serum: 1.35 uIU/mL (10-07-21 @ 04:23)            Immunofixation, Serum:   Weak IgG Kappa Band Identified    Reference Range: None Detected (10-08-21 @ 23:37)  Serum Protein Electrophoresis Interp: Weak Gamma-Migrating Paraprotein Identified (10-08-21 @ 23:37)  Serum Pro-Brain Natriuretic Peptide: 7438 pg/mL (10-07-21 @ 04:23)  Serum Pro-Brain Natriuretic Peptide: 8061 pg/mL (10-06-21 @ 21:49)      RADIOLOGY & ADDITIONAL TESTS:      HEALTH ISSUES - PROBLEM Dx:  PNA (pneumonia)    Elevated troponin    Chronic atrial fibrillation    Stage 3 chronic kidney disease    Chronic diastolic congestive heart failure    CAD (coronary artery disease)    DVT prophylaxis    Anemia of chronic disease    DM (diabetes mellitus), type 2    Depression, major    Abnormal CT scan of lung    Hypertensive urgency    Multiple thyroid nodules            Consultant(s) Notes Reviewed:  [ x ] YES     Care Discussed with [X] Consultants  [  ] Patient  [  ] Family [  ] HCP [  ]   [  ] Social Service  [  ] RN, [  ] Physical Therapy,[  ] Palliative care team  DVT PPX: [  ] Lovenox, [  ] S C Heparin, [  ] Coumadin, [  ] Xarelto, [ x ] Eliquis, [  ] Pradaxa, [  ] IV Heparin drip, [  ] SCD [  ] Contraindication 2 to GI Bleed,[  ] Ambulation [  ] Contraindicated 2 to  bleed [  ] Contraindicated 2 to Brain Bleed  Advanced directive: [ x ] None, [  ] DNR/DNI Patient is a 76y old  Male who presents with a chief complaint of multilobar PNA, afib (12 Oct 2021 13:04)    HPI:  75 yo M with PMHx of Type 2 DM (not on insulin), BPH, CAD s/p stents >4 years ago (not on plavix), diverticulitis (hospitalized 07/2020 at 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 today with multiple concerns including subjective fevers, SOB, weakness, and brief episodes of palpitations, and urinary frequency "for at least 3 days". Pt states he would intermittently feel chills and feverish, recorded no temps at group home since last couple of days, he states progressively worsening of dyspnea on exertion and rest, patient unable to walk short distances or climb stairs due to dyspnea, however denies orthopnea, cough, sputum production, night sweats. Patient mentioned some dizziness associated with lower extremities weakness, usually ambulates independently but now feels unable to hold his weight. Regarding palpitations, pt states for 3 days his heart would skip a beat, return to normal. Self limited episodes under a minute, no associated chest pain. Pt also describes urinary frequency beyond baseline, denies dysuria or incontinence, hematuria, constipation.     ED course:   VS: Afebrile, HR: 80 BP: 220/100->235/116-> 189/85, Spo2: 98 on NC RR: 20  Labs significant for low stable hemoglobin, BUN/creatinine: 36/2.30, Trop 0.149, Pro BMP 8061.   EKG on admission showed Afib @ 64 bpm, incompleta right BBB, LVH  CT head shows no  evidence of acute intracranial hemorrhage, midline shift or CT evidence of acute territorial infarct.  CT chest A/P demonstrated Multilobar pneumonia. Mild cardiomegaly. No mall bowel obstruction or active bowel inflammation.  Patient was given in the ED 10 mg IVP hydralazine 10 mg Labetalol IVP 1x, 2000 cc bolus NS 1x        (07 Oct 2021 01:24)    INTERVAL HPI:   10/07/21: Pt seen and examined at bedside; in no acute distress; complains of intermittent palpitation and transient chest pain. On Zosyn 3.375gm q8h + azithromycin 500mg daily; concern for medication compliance; ??capacity. will consult psych. K replaced  10/8/21: Pt seen and examined at bedside. Pt is lethargic and is sleeping although answers with yes and no questions. He has no acute complaints. Denies fevers, chills, chest pain, SOB, abdominal pain, n/v/d/c. On Zosyn 3.375gm q8h + azithromycin 500mg daily. Blood pressure improving. PT recommended LETI yesterday.  10/9/21: Pt seen and examined at bedside. Pt with no complaints this am. Worsening Cr function this am; Nephro on board. Will continue to monitor.   10/10/21: Pt seen and examined at bedside with no complaints this am; On renally dosed zosyn; Add Zithromax, improving renal function this am; pending psych consultation with Dr Winkler.  10/11/21: Patient was seen and examined at bedside. Patient did not want to provide history and wanted to continue sleeping. Patient states he is very sleepy and wants rest. On IV Zosyn /Po Zithromax   10/12/21: Patient was seen and examined at bedside. On IV Zosyn , Replace K , PT---> HCPT    OVERNIGHT EVENTS: No overnight events     Home Medications:  cloNIDine 0.1 mg oral tablet: 2 tab(s) orally 3 times a day (07 Oct 2021 03:41)  digoxin 125 mcg (0.125 mg) oral tablet: 1 tab(s) orally once a day (07 Oct 2021 03:41)  Eliquis 5 mg oral tablet: 1 tab(s) orally 2 times a day (07 Oct 2021 03:41)  famotidine 40 mg oral tablet: 1 tab(s) orally once a day (at bedtime) (07 Oct 2021 03:41)  fenofibrate 145 mg oral tablet: 1 tab(s) orally once a day (07 Oct 2021 03:41)  labetalol 100 mg oral tablet: 1 tab(s) orally 2 times a day (07 Oct 2021 03:41)  Lasix 20 mg oral tablet: 1 tab(s) orally 2 times a day (07 Oct 2021 03:41)  oxybutynin 5 mg oral tablet: 1 tab(s) orally 4 times a day (07 Oct 2021 03:41)  pantoprazole 40 mg oral delayed release tablet: 1 tab(s) orally once a day (before a meal) (07 Oct 2021 03:41)  Symbicort 160 mcg-4.5 mcg/inh inhalation aerosol: 2 puff(s) inhaled 2 times a day (07 Oct 2021 03:41)      MEDICATIONS  (STANDING):  amLODIPine   Tablet 10 milliGRAM(s) Oral daily  apixaban 5 milliGRAM(s) Oral every 12 hours  ARIPiprazole 30 milliGRAM(s) Oral daily  aspirin enteric coated 81 milliGRAM(s) Oral daily  budesonide 160 MICROgram(s)/formoterol 4.5 MICROgram(s) Inhaler 2 Puff(s) Inhalation two times a day  cloNIDine 0.2 milliGRAM(s) Oral two times a day  cyanocobalamin 1000 MICROGram(s) Oral daily  dextrose 40% Gel 15 Gram(s) Oral once  dextrose 5%. 1000 milliLiter(s) (50 mL/Hr) IV Continuous <Continuous>  dextrose 50% Injectable 25 Gram(s) IV Push once  doxazosin 4 milliGRAM(s) Oral <User Schedule>  famotidine    Tablet 20 milliGRAM(s) Oral every 48 hours  folic acid 1 milliGRAM(s) Oral daily  glucagon  Injectable 1 milliGRAM(s) IntraMuscular once  hydrALAZINE 100 milliGRAM(s) Oral every 8 hours  insulin lispro (ADMELOG) corrective regimen sliding scale   SubCutaneous three times a day before meals  lamoTRIgine 100 milliGRAM(s) Oral daily  mirtazapine 30 milliGRAM(s) Oral at bedtime  nystatin Powder 1 Application(s) Topical two times a day  oxybutynin 5 milliGRAM(s) Oral four times a day  pantoprazole    Tablet 40 milliGRAM(s) Oral before breakfast  piperacillin/tazobactam IVPB.. 3.375 Gram(s) IV Intermittent every 12 hours  potassium chloride    Tablet ER 40 milliEquivalent(s) Oral once  venlafaxine XR. 150 milliGRAM(s) Oral daily    MEDICATIONS  (PRN):  acetaminophen   Tablet .. 650 milliGRAM(s) Oral every 6 hours PRN Temp greater or equal to 38C (100.4F), Mild Pain (1 - 3)  melatonin 3 milliGRAM(s) Oral at bedtime PRN Insomnia  ondansetron Injectable 4 milliGRAM(s) IV Push every 8 hours PRN Nausea and/or Vomiting  sodium chloride 0.65% Nasal 1 Spray(s) Both Nostrils two times a day PRN Nasal Congestion      Allergies    No Known Allergies    Intolerances        Social History:  Tobacco: quit in 1980, not active smoker  EtOH: denies   Recreational drug use: cocaine several years ago "a few times" has not used in "many years"  Lives with: [facility] house; group home; no nursing assistance; observation is there  Ambulates: independent, denies walker or cane but uses guard railings  ADLs: independent, but states need for assistance in bathing, cooking; independent with feeding, ambulating  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 No nausea, No vomiting; No diarrhea, No constipation. [  ] BM  GENITOURINARY: No dysuria, No frequency, No urgency, No hematuria, NO incontinence  NEUROLOGICAL: No headaches, No dizziness, No numbness, No tingling, No tremors, No weakness  EXT: No Swelling, No Pain, No Edema  SKIN:  [ x ] No itching, burning, rashes, or lesions   MUSCULOSKELETAL: No joint pain ,No Jt swelling; No muscle pain, No back pain, No extremity pain  PSYCHIATRIC: No depression,  No anxiety,  No mood swings ,No difficulty sleeping at night  PAIN SCALE: [ x ] None  [  ] Other-  ROS Unable to obtain due to - [  ] Dementia  [  ] Lethargy [ X ] Drowsy [  ] Sedated [  ] non verbal  REST OF REVIEW Of SYSTEM - [x  ] Normal     Vital Signs Last 24 Hrs  T(C): 36.7 (12 Oct 2021 11:55), Max: 36.7 (12 Oct 2021 11:55)  T(F): 98.1 (12 Oct 2021 11:55), Max: 98.1 (12 Oct 2021 11:55)  HR: 70 (12 Oct 2021 11:55) (59 - 70)  BP: 156/74 (12 Oct 2021 11:55) (150/65 - 177/81)  BP(mean): --  RR: 18 (12 Oct 2021 11:55) (18 - 19)  SpO2: 93% (12 Oct 2021 11:55) (93% - 97%)  Finger Stick          PHYSICAL EXAM:  GENERAL:  [ x ] NAD , [  ] well appearing, [  ] Agitated, [ x ] Drowsy,  [  ] Lethargy, [  ] confused   HEAD:  [x  ] Normal, [  ] Other  EYES:  [ x ] EOMI, [ ] PERRLA, [ x ] conjunctiva and sclera clear normal, [  ] Other,  [  ] Pallor,[  ] Discharge  ENMT:  [  ] Normal, [ x ] Moist mucous membranes, [x  ] Good dentition, [ x ] No Thrush  NECK:  [x ] Supple, [x  ] No JVD, [  ] Normal thyroid, [  ] Lymphadenopathy [  ] Other  CHEST/LUNG:  [ x ] Clear to auscultation bilaterally, [x  ] Breath Sounds equal B/L / Decrease, [  ] poor effort  [x  ] No rales, [x  ] No rhonchi  [ x ]  No wheezing,   HEART:  [ x ] Regular rate and rhythm, [  ] tachycardia, [  ] Bradycardia,  [  ] irregular  [ x ] No murmurs, No rubs, No gallops, [  ] PPM in place (Mfr:  )  ABDOMEN:  [ x ] Soft, [ x ] Nontender, [ x ] Nondistended, [ x ] No mass, [x  ] Bowel sounds present, [ x ] obese  NERVOUS SYSTEM:  [ x ] Alert & Oriented X3, [x  ] Nonfocal  [  ] Confusion  [  ] Encephalopathic [  ] Sedated [ x ] Unable to assess, [  ] Dementia [  ] Other-  EXTREMITIES: [x  ] 2+ Peripheral Pulses, No clubbing, No cyanosis,  [  ] edema B/L lower EXT. [  ] PVD stasis skin changes B/L Lower EXT, [  ] wound  LYMPH: No lymphadenopathy noted  SKIN:  [ x ] No rashes or lesions, [  ] Pressure Ulcers, [x  ] ecchymosis, [  ] Skin Tears, [ x ] Other- Skin Tattoos    DIET: Diet, Consistent Carbohydrate Renal w/Evening Snack:   DASH/TLC Sodium & Cholesterol Restricted (10-07-21 @ 02:46)      LABS:                        8.0    5.77  )-----------( 248      ( 12 Oct 2021 07:27 )             26.5     12 Oct 2021 07:27    140    |  110    |  39     ----------------------------<  102    3.4     |  23     |  2.80     Ca    8.7        12 Oct 2021 07:27    TPro  6.4    /  Alb  2.7    /  TBili  0.2    /  DBili  x      /  AST  11     /  ALT  18     /  AlkPhos  36     12 Oct 2021 07:27          Culture Results:   No Growth Final (10-07 @ 03:57)  Culture Results:   No Growth Final (10-07 @ 03:57)  Culture Results:   <10,000 CFU/mL Normal Urogenital Johnna (10-07 @ 02:42)          CARDIAC MARKERS ( 07 Oct 2021 14:20 )  .112 ng/mL / x     / x     / x     / x      CARDIAC MARKERS ( 07 Oct 2021 04:23 )  .145 ng/mL / x     / 106 U/L / x     / 2.2 ng/mL  CARDIAC MARKERS ( 06 Oct 2021 21:51 )  .149 ng/mL / x     / x     / x     / x              Culture - Blood (collected 07 Oct 2021 03:57)  Source: .Blood Blood-Peripheral  Final Report (12 Oct 2021 04:00):    No Growth Final    Culture - Blood (collected 07 Oct 2021 03:57)  Source: .Blood Blood-Peripheral  Final Report (12 Oct 2021 04:00):    No Growth Final    Culture - Urine (collected 07 Oct 2021 02:42)  Source: Clean Catch Clean Catch (Midstream)  Final Report (08 Oct 2021 12:49):    <10,000 CFU/mL Normal Urogenital Johnna         Anemia Panel:  Iron Total, Serum: 28 ug/dL (10-08-21 @ 22:27)  Iron - Total Binding Capacity.: 337 ug/dL (10-08-21 @ 22:27)  Vitamin B12, Serum: 413 pg/mL (10-08-21 @ 22:26)  Folate, Serum: 9.9 ng/mL (10-08-21 @ 22:26)  Ferritin, Serum: 28 ng/mL (10-08-21 @ 22:26)  Absolute Reticulocytes: 49.4 K/uL (10-08-21 @ 16:57)      Thyroid Panel:  Thyroid Stimulating Hormone, Serum: 1.35 uIU/mL (10-07-21 @ 04:23)      Immunofixation, Serum:   Weak IgG Kappa Band Identified    Reference Range: None Detected (10-08-21 @ 23:37)  Serum Protein Electrophoresis Interp: Weak Gamma-Migrating Paraprotein Identified (10-08-21 @ 23:37)  Serum Pro-Brain Natriuretic Peptide: 7438 pg/mL (10-07-21 @ 04:23)  Serum Pro-Brain Natriuretic Peptide: 8061 pg/mL (10-06-21 @ 21:49)      RADIOLOGY & ADDITIONAL TESTS: none      HEALTH ISSUES - PROBLEM Dx:  PNA (pneumonia)    Elevated troponin    Chronic atrial fibrillation    Stage 3 chronic kidney disease    Chronic diastolic congestive heart failure    CAD (coronary artery disease)    DVT prophylaxis    Anemia of chronic disease    DM (diabetes mellitus), type 2    Depression, major    Abnormal CT scan of lung    Hypertensive urgency    Multiple thyroid nodules        Consultant(s) Notes Reviewed:  [ x ] YES     Care Discussed with [X] Consultants  [ x ] Patient  [ x ] Family [  ] HCP [  ]   [  ] Social Service  [ x ] RN, [  ] Physical Therapy,[  ] Palliative care team  DVT PPX: [  ] Lovenox, [  ] S C Heparin, [  ] Coumadin, [  ] Xarelto, [ x ] Eliquis, [  ] Pradaxa, [  ] IV Heparin drip, [  ] SCD [  ] Contraindication 2 to GI Bleed,[  ] Ambulation [  ] Contraindicated 2 to  bleed [  ] Contraindicated 2 to Brain Bleed  Advanced directive: [ x ] None, [  ] DNR/DNI Patient is a 76y old  Male who presents with a chief complaint of multilobar PNA, afib (12 Oct 2021 13:04)    HPI:  75 yo M with PMHx of Type 2 DM (not on insulin), BPH, CAD s/p stents >4 years ago (not on plavix), diverticulitis (hospitalized 07/2020 at [facility]), GIB 2/2 diverticulosis (2020), anxiety, Lupus, HTN, HLD, CKD stage 3, HepC, hx of blood clots in brain (s/p surgery 2013) [facility] to [facility] today with multiple concerns including subjective fevers, SOB, weakness, and brief episodes of palpitations, and urinary frequency "for at least 3 days". Pt states he would intermittently feel chills and feverish, recorded no temps at group home since last couple of days, he states progressively worsening of dyspnea on exertion and rest, patient unable to walk short distances or climb stairs due to dyspnea, however denies orthopnea, cough, sputum production, night sweats. Patient mentioned some dizziness associated with lower extremities weakness, usually ambulates independently but now feels unable to hold his weight. Regarding palpitations, pt states for 3 days his heart would skip a beat, return to normal. Self limited episodes under a minute, no associated chest pain. Pt also describes urinary frequency beyond baseline, denies dysuria or incontinence, hematuria, constipation.     ED course:   VS: Afebrile, HR: 80 BP: 220/100->235/116-> 189/85, Spo2: 98 on NC RR: 20  Labs significant for low stable hemoglobin, BUN/creatinine: 36/2.30, Trop 0.149, Pro BMP 8061.   EKG on admission showed Afib @ 64 bpm, incompleta right BBB, LVH  CT head shows no  evidence of acute intracranial hemorrhage, midline shift or CT evidence of acute territorial infarct.  CT chest A/P demonstrated Multilobar pneumonia. Mild cardiomegaly. No mall bowel obstruction or active bowel inflammation.  Patient was given in the ED 10 mg IVP hydralazine 10 mg Labetalol IVP 1x, 2000 cc bolus NS 1x        (07 Oct 2021 01:24)    INTERVAL HPI:   10/07/21: Pt seen and examined at bedside; in no acute distress; complains of intermittent palpitation and transient chest pain. On Zosyn 3.375gm q8h + azithromycin 500mg daily; concern for medication compliance; ??capacity. will consult psych. K replaced  10/8/21: Pt seen and examined at bedside. Pt is lethargic and is sleeping although answers with yes and no questions. He has no acute complaints. Denies fevers, chills, chest pain, SOB, abdominal pain, n/v/d/c. On Zosyn 3.375gm q8h + azithromycin 500mg daily. Blood pressure improving. PT recommended LETI yesterday.  10/9/21: Pt seen and examined at bedside. Pt with no complaints this am. Worsening Cr function this am; Nephro on board. Will continue to monitor.   10/10/21: Pt seen and examined at bedside with no complaints this am; On renally dosed zosyn; Add Zithromax, improving renal function this am; pending psych consultation with Dr Winkler.  10/11/21: Patient was seen and examined at bedside. Patient did not want to provide history and wanted to continue sleeping. Patient states he is very sleepy and wants rest. On IV Zosyn /Po Zithromax   10/12/21: Patient was seen and examined at bedside. Switched from IV Zosyn to Augmentin BID x2 days , Replaced K , PT---> HCPT    OVERNIGHT EVENTS: No overnight events     Home Medications:  cloNIDine 0.1 mg oral tablet: 2 tab(s) orally 3 times a day (07 Oct 2021 03:41)  digoxin 125 mcg (0.125 mg) oral tablet: 1 tab(s) orally once a day (07 Oct 2021 03:41)  Eliquis 5 mg oral tablet: 1 tab(s) orally 2 times a day (07 Oct 2021 03:41)  famotidine 40 mg oral tablet: 1 tab(s) orally once a day (at bedtime) (07 Oct 2021 03:41)  fenofibrate 145 mg oral tablet: 1 tab(s) orally once a day (07 Oct 2021 03:41)  labetalol 100 mg oral tablet: 1 tab(s) orally 2 times a day (07 Oct 2021 03:41)  Lasix 20 mg oral tablet: 1 tab(s) orally 2 times a day (07 Oct 2021 03:41)  oxybutynin 5 mg oral tablet: 1 tab(s) orally 4 times a day (07 Oct 2021 03:41)  pantoprazole 40 mg oral delayed release tablet: 1 tab(s) orally once a day (before a meal) (07 Oct 2021 03:41)  Symbicort 160 mcg-4.5 mcg/inh inhalation aerosol: 2 puff(s) inhaled 2 times a day (07 Oct 2021 03:41)      MEDICATIONS  (STANDING):  amLODIPine   Tablet 10 milliGRAM(s) Oral daily  apixaban 5 milliGRAM(s) Oral every 12 hours  ARIPiprazole 30 milliGRAM(s) Oral daily  aspirin enteric coated 81 milliGRAM(s) Oral daily  budesonide 160 MICROgram(s)/formoterol 4.5 MICROgram(s) Inhaler 2 Puff(s) Inhalation two times a day  cloNIDine 0.2 milliGRAM(s) Oral two times a day  cyanocobalamin 1000 MICROGram(s) Oral daily  dextrose 40% Gel 15 Gram(s) Oral once  dextrose 5%. 1000 milliLiter(s) (50 mL/Hr) IV Continuous <Continuous>  dextrose 50% Injectable 25 Gram(s) IV Push once  doxazosin 4 milliGRAM(s) Oral <User Schedule>  famotidine    Tablet 20 milliGRAM(s) Oral every 48 hours  folic acid 1 milliGRAM(s) Oral daily  glucagon  Injectable 1 milliGRAM(s) IntraMuscular once  hydrALAZINE 100 milliGRAM(s) Oral every 8 hours  insulin lispro (ADMELOG) corrective regimen sliding scale   SubCutaneous three times a day before meals  lamoTRIgine 100 milliGRAM(s) Oral daily  mirtazapine 30 milliGRAM(s) Oral at bedtime  nystatin Powder 1 Application(s) Topical two times a day  oxybutynin 5 milliGRAM(s) Oral four times a day  pantoprazole    Tablet 40 milliGRAM(s) Oral before breakfast  piperacillin/tazobactam IVPB.. 3.375 Gram(s) IV Intermittent every 12 hours  potassium chloride    Tablet ER 40 milliEquivalent(s) Oral once  venlafaxine XR. 150 milliGRAM(s) Oral daily    MEDICATIONS  (PRN):  acetaminophen   Tablet .. 650 milliGRAM(s) Oral every 6 hours PRN Temp greater or equal to 38C (100.4F), Mild Pain (1 - 3)  melatonin 3 milliGRAM(s) Oral at bedtime PRN Insomnia  ondansetron Injectable 4 milliGRAM(s) IV Push every 8 hours PRN Nausea and/or Vomiting  sodium chloride 0.65% Nasal 1 Spray(s) Both Nostrils two times a day PRN Nasal Congestion      Allergies    No Known Allergies    Intolerances        Social History:  Tobacco: quit in 1980, not active smoker  EtOH: denies   Recreational drug use: cocaine several years ago "a few times" has not used in "many years"  Lives with: [facility]; group home; no nursing assistance; observation is there  Ambulates: independent, denies walker or cane but uses guard railings  ADLs: independent, but states need for assistance in bathing, cooking; independent with feeding, ambulating  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[place]  Vaccinations: Moderna x2 doses last dose in May (07 Oct 2021 01:24)      REVIEW OF SYSTEMS:  CONSTITUTIONAL: No fever, No chills, No fatigue, No myalgia, No Body ache, No Weakness  EYES: No eye pain,  No visual disturbances, No discharge, NO Redness  ENMT:  No ear pain, No nose bleed, No vertigo; No sinus pain, NO throat pain, No Congestion  NECK: No pain, No stiffness  RESPIRATORY: No cough, NO wheezing, No  hemoptysis, NO  shortness of breath  CARDIOVASCULAR: No chest pain, palpitations  GASTROINTESTINAL: No abdominal pain, NO epigastric pain. No nausea, No vomiting; No diarrhea, No constipation. [  ] BM  GENITOURINARY: No dysuria, No frequency, No urgency, No hematuria, NO incontinence  NEUROLOGICAL: No headaches, No dizziness, No numbness, No tingling, No tremors, No weakness  EXT: No Swelling, No Pain, No Edema  SKIN:  [ x ] No itching, burning, rashes, or lesions   MUSCULOSKELETAL: No joint pain ,No Jt swelling; No muscle pain, No back pain, No extremity pain  PSYCHIATRIC: No depression,  No anxiety,  No mood swings ,No difficulty sleeping at night  PAIN SCALE: [ x ] None  [  ] Other-  ROS Unable to obtain due to - [  ] Dementia  [  ] Lethargy [ X ] Drowsy [  ] Sedated [  ] non verbal  REST OF REVIEW Of SYSTEM - [x  ] Normal     Vital Signs Last 24 Hrs  T(C): 36.7 (12 Oct 2021 11:55), Max: 36.7 (12 Oct 2021 11:55)  T(F): 98.1 (12 Oct 2021 11:55), Max: 98.1 (12 Oct 2021 11:55)  HR: 70 (12 Oct 2021 11:55) (59 - 70)  BP: 156/74 (12 Oct 2021 11:55) (150/65 - 177/81)  BP(mean): --  RR: 18 (12 Oct 2021 11:55) (18 - 19)  SpO2: 93% (12 Oct 2021 11:55) (93% - 97%)  Finger Stick          PHYSICAL EXAM:  GENERAL:  [ x ] NAD , [  ] well appearing, [  ] Agitated, [ x ] Drowsy,  [  ] Lethargy, [  ] confused   HEAD:  [x  ] Normal, [  ] Other  EYES:  [ x ] EOMI, [ ] PERRLA, [ x ] conjunctiva and sclera clear normal, [  ] Other,  [  ] Pallor,[  ] Discharge  ENMT:  [  ] Normal, [ x ] Moist mucous membranes, [x  ] Good dentition, [ x ] No Thrush  NECK:  [x ] Supple, [x  ] No JVD, [  ] Normal thyroid, [  ] Lymphadenopathy [  ] Other  CHEST/LUNG:  [ x ] Clear to auscultation bilaterally, [x  ] Breath Sounds equal B/L / Decrease, [  ] poor effort  [x  ] No rales, [x  ] No rhonchi  [ x ]  No wheezing,   HEART:  [ x ] Regular rate and rhythm, [  ] tachycardia, [  ] Bradycardia,  [  ] irregular  [ x ] No murmurs, No rubs, No gallops, [  ] PPM in place (Mfr:  )  ABDOMEN:  [ x ] Soft, [ x ] Nontender, [ x ] Nondistended, [ x ] No mass, [x  ] Bowel sounds present, [ x ] obese  NERVOUS SYSTEM:  [ x ] Alert & Oriented X3, [x  ] Nonfocal  [  ] Confusion  [  ] Encephalopathic [  ] Sedated [ x ] Unable to assess, [  ] Dementia [  ] Other-  EXTREMITIES: [x  ] 2+ Peripheral Pulses, No clubbing, No cyanosis,  [  ] edema B/L lower EXT. [  ] PVD stasis skin changes B/L Lower EXT, [  ] wound  LYMPH: No lymphadenopathy noted  SKIN:  [ x ] No rashes or lesions, [  ] Pressure Ulcers, [x  ] ecchymosis, [  ] Skin Tears, [ x ] Other- Skin Tattoos    DIET: Diet, Consistent Carbohydrate Renal w/Evening Snack:   DASH/TLC Sodium & Cholesterol Restricted (10-07-21 @ 02:46)      LABS:                        8.0    5.77  )-----------( 248      ( 12 Oct 2021 07:27 )             26.5     12 Oct 2021 07:27    140    |  110    |  39     ----------------------------<  102    3.4     |  23     |  2.80     Ca    8.7        12 Oct 2021 07:27    TPro  6.4    /  Alb  2.7    /  TBili  0.2    /  DBili  x      /  AST  11     /  ALT  18     /  AlkPhos  36     12 Oct 2021 07:27          Culture Results:   No Growth Final (10-07 @ 03:57)  Culture Results:   No Growth Final (10-07 @ 03:57)  Culture Results:   <10,000 CFU/mL Normal Urogenital Johnna (10-07 @ 02:42)          CARDIAC MARKERS ( 07 Oct 2021 14:20 )  .112 ng/mL / x     / x     / x     / x      CARDIAC MARKERS ( 07 Oct 2021 04:23 )  .145 ng/mL / x     / 106 U/L / x     / 2.2 ng/mL  CARDIAC MARKERS ( 06 Oct 2021 21:51 )  .149 ng/mL / x     / x     / x     / x              Culture - Blood (collected 07 Oct 2021 03:57)  Source: .Blood Blood-Peripheral  Final Report (12 Oct 2021 04:00):    No Growth Final    Culture - Blood (collected 07 Oct 2021 03:57)  Source: .Blood Blood-Peripheral  Final Report (12 Oct 2021 04:00):    No Growth Final    Culture - Urine (collected 07 Oct 2021 02:42)  Source: Clean Catch Clean Catch (Midstream)  Final Report (08 Oct 2021 12:49):    <10,000 CFU/mL Normal Urogenital Johnna         Anemia Panel:  Iron Total, Serum: 28 ug/dL (10-08-21 @ 22:27)  Iron - Total Binding Capacity.: 337 ug/dL (10-08-21 @ 22:27)  Vitamin B12, Serum: 413 pg/mL (10-08-21 @ 22:26)  Folate, Serum: 9.9 ng/mL (10-08-21 @ 22:26)  Ferritin, Serum: 28 ng/mL (10-08-21 @ 22:26)  Absolute Reticulocytes: 49.4 K/uL (10-08-21 @ 16:57)      Thyroid Panel:  Thyroid Stimulating Hormone, Serum: 1.35 uIU/mL (10-07-21 @ 04:23)      Immunofixation, Serum:   Weak IgG Kappa Band Identified    Reference Range: None Detected (10-08-21 @ 23:37)  Serum Protein Electrophoresis Interp: Weak Gamma-Migrating Paraprotein Identified (10-08-21 @ 23:37)  Serum Pro-Brain Natriuretic Peptide: 7438 pg/mL (10-07-21 @ 04:23)  Serum Pro-Brain Natriuretic Peptide: 8061 pg/mL (10-06-21 @ 21:49)      RADIOLOGY & ADDITIONAL TESTS: none      HEALTH ISSUES - PROBLEM Dx:  PNA (pneumonia)    Elevated troponin    Chronic atrial fibrillation    Stage 3 chronic kidney disease    Chronic diastolic congestive heart failure    CAD (coronary artery disease)    DVT prophylaxis    Anemia of chronic disease    DM (diabetes mellitus), type 2    Depression, major    Abnormal CT scan of lung    Hypertensive urgency    Multiple thyroid nodules        Consultant(s) Notes Reviewed:  [ x ] YES     Care Discussed with [X] Consultants  [ x ] Patient  [ x ] Family [  ] HCP [  ]   [  ] Social Service  [ x ] RN, [  ] Physical Therapy,[  ] Palliative care team  DVT PPX: [  ] Lovenox, [  ] S C Heparin, [  ] Coumadin, [  ] Xarelto, [ x ] Eliquis, [  ] Pradaxa, [  ] IV Heparin drip, [  ] SCD [  ] Contraindication 2 to GI Bleed,[  ] Ambulation [  ] Contraindicated 2 to  bleed [  ] Contraindicated 2 to Brain Bleed  Advanced directive: [ x ] None, [  ] DNR/DNI

## 2021-10-12 NOTE — PROGRESS NOTE ADULT - SUBJECTIVE AND OBJECTIVE BOX
Date/Time Patient Seen:  		  Referring MD:   Data Reviewed	       Patient is a 76y old  Male who presents with a chief complaint of multilobar PNA, afib (11 Oct 2021 14:37)      Subjective/HPI     PAST MEDICAL & SURGICAL HISTORY:  Hypertension    Diabetes mellitus    Lupus    Hepatitis C    Anxiety and depression    CAD (coronary artery disease)  s/p stents    Diverticulosis    Hyperlipidemia    HTN (hypertension)  c/b multiple episodes of hypertensive urgency    HLD (hyperlipidemia)    Atrial fibrillation  first documented on EKG 10/7/2021    CAD (coronary artery disease)  s/p stents (not on plavix)    Type 2 diabetes mellitus  not on home insulin    Anxiety  multiple psych medications    History of diverticulitis  07/2021    Diverticulosis  c/b GIB in 2020    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  Had surgery ( April 2013 )    S/P tonsillectomy    S/P arthroscopic knee surgery  Bilateral ( 2005 )    Torsion of testicle  Had surgery at age 14    Pilonidal cyst  Had surgery ( 1969 )    S/P cataract surgery  Bilateral    H/O hernia repair          Medication list         MEDICATIONS  (STANDING):  amLODIPine   Tablet 10 milliGRAM(s) Oral daily  apixaban 5 milliGRAM(s) Oral every 12 hours  ARIPiprazole 30 milliGRAM(s) Oral daily  aspirin enteric coated 81 milliGRAM(s) Oral daily  azithromycin   Tablet 500 milliGRAM(s) Oral daily  budesonide 160 MICROgram(s)/formoterol 4.5 MICROgram(s) Inhaler 2 Puff(s) Inhalation two times a day  cloNIDine 0.2 milliGRAM(s) Oral two times a day  cyanocobalamin 1000 MICROGram(s) Oral daily  dextrose 40% Gel 15 Gram(s) Oral once  dextrose 5%. 1000 milliLiter(s) (50 mL/Hr) IV Continuous <Continuous>  dextrose 50% Injectable 25 Gram(s) IV Push once  doxazosin 4 milliGRAM(s) Oral <User Schedule>  famotidine    Tablet 20 milliGRAM(s) Oral every 48 hours  folic acid 1 milliGRAM(s) Oral daily  glucagon  Injectable 1 milliGRAM(s) IntraMuscular once  hydrALAZINE 100 milliGRAM(s) Oral every 8 hours  insulin lispro (ADMELOG) corrective regimen sliding scale   SubCutaneous three times a day before meals  lamoTRIgine 100 milliGRAM(s) Oral daily  mirtazapine 30 milliGRAM(s) Oral at bedtime  nystatin Powder 1 Application(s) Topical two times a day  oxybutynin 5 milliGRAM(s) Oral four times a day  pantoprazole    Tablet 40 milliGRAM(s) Oral before breakfast  piperacillin/tazobactam IVPB.. 3.375 Gram(s) IV Intermittent every 12 hours  venlafaxine XR. 150 milliGRAM(s) Oral daily    MEDICATIONS  (PRN):  acetaminophen   Tablet .. 650 milliGRAM(s) Oral every 6 hours PRN Temp greater or equal to 38C (100.4F), Mild Pain (1 - 3)  melatonin 3 milliGRAM(s) Oral at bedtime PRN Insomnia  ondansetron Injectable 4 milliGRAM(s) IV Push every 8 hours PRN Nausea and/or Vomiting  sodium chloride 0.65% Nasal 1 Spray(s) Both Nostrils two times a day PRN Nasal Congestion         Vitals log        ICU Vital Signs Last 24 Hrs  T(C): 36.4 (12 Oct 2021 04:25), Max: 36.7 (11 Oct 2021 11:59)  T(F): 97.5 (12 Oct 2021 04:25), Max: 98.1 (11 Oct 2021 11:59)  HR: 65 (12 Oct 2021 04:25) (59 - 75)  BP: 177/81 (12 Oct 2021 04:25) (143/67 - 177/81)  BP(mean): --  ABP: --  ABP(mean): --  RR: 18 (12 Oct 2021 04:25) (18 - 19)  SpO2: 94% (12 Oct 2021 04:25) (89% - 97%)           Input and Output:  I&O's Detail      Lab Data                        8.7    7.04  )-----------( 250      ( 11 Oct 2021 06:48 )             28.8     10-11    139  |  109<H>  |  42<H>  ----------------------------<  118<H>  3.5   |  23  |  2.90<H>    Ca    8.6      11 Oct 2021 06:48    TPro  6.8  /  Alb  2.7<L>  /  TBili  0.3  /  DBili  x   /  AST  13<L>  /  ALT  19  /  AlkPhos  36<L>  10-11            Review of Systems	      Objective     Physical Examination    heart ss1s2  lung dec BS  abd soft  head nc      Pertinent Lab findings & Imaging      El:  NO   Adequate UO     I&O's Detail           Discussed with:     Cultures:	        Radiology

## 2021-10-12 NOTE — PROGRESS NOTE ADULT - PROBLEM SELECTOR PLAN 1
Multilobar PNA -improving   - On admission does not meet sepsis criteria; afebrile, WBC WNL, normal lactate   - RIVP negative on admission   - CT Chest showed branching nodular opacities and patchy airspace opacities noted in the lingula and bilateral lower lobes which are likely infectious in etiology. No pleural effusions are present.   - s/p 1g Rocephin and Azithromycin 500 mg daily x 2 days  - Will continue renally dosed Zosyn  q 12 hrs-Renal Dose Abx with azithromycin 500 mg qd per ID Dr. Garner  - BCx NGTD,   - procalcitonin --> elevated 0.14  CBC in AM Multilobar PNA -improving   - On admission does not meet sepsis criteria; afebrile, WBC WNL, normal lactate   - RIVP negative on admission   - CT Chest showed branching nodular opacities and patchy airspace opacities noted in the lingula and bilateral lower lobes which are likely infectious in etiology. No pleural effusions are present.   - s/p 1g Rocephin and Azithromycin 500 mg daily x 2 days  - Will switch Zosyn to Augmentin 500mg BID x days per ID Dr. Garner  - BCx NGTD,   - procalcitonin --> elevated 0.14, repeat 0.24  CBC in AM

## 2021-10-12 NOTE — PROGRESS NOTE ADULT - SUBJECTIVE AND OBJECTIVE BOX
All interim records and events noted.    up in chair, denies pain or SOB      MEDICATIONS  (STANDING):  amLODIPine   Tablet 10 milliGRAM(s) Oral daily  apixaban 5 milliGRAM(s) Oral every 12 hours  ARIPiprazole 30 milliGRAM(s) Oral daily  aspirin enteric coated 81 milliGRAM(s) Oral daily  budesonide 160 MICROgram(s)/formoterol 4.5 MICROgram(s) Inhaler 2 Puff(s) Inhalation two times a day  cloNIDine 0.2 milliGRAM(s) Oral two times a day  cyanocobalamin 1000 MICROGram(s) Oral daily  dextrose 40% Gel 15 Gram(s) Oral once  dextrose 5%. 1000 milliLiter(s) (50 mL/Hr) IV Continuous <Continuous>  dextrose 50% Injectable 25 Gram(s) IV Push once  doxazosin 4 milliGRAM(s) Oral <User Schedule>  famotidine    Tablet 20 milliGRAM(s) Oral every 48 hours  folic acid 1 milliGRAM(s) Oral daily  glucagon  Injectable 1 milliGRAM(s) IntraMuscular once  hydrALAZINE 100 milliGRAM(s) Oral every 8 hours  insulin lispro (ADMELOG) corrective regimen sliding scale   SubCutaneous three times a day before meals  lamoTRIgine 100 milliGRAM(s) Oral daily  mirtazapine 30 milliGRAM(s) Oral at bedtime  nystatin Powder 1 Application(s) Topical two times a day  oxybutynin 5 milliGRAM(s) Oral four times a day  pantoprazole    Tablet 40 milliGRAM(s) Oral before breakfast  piperacillin/tazobactam IVPB.. 3.375 Gram(s) IV Intermittent every 12 hours  potassium chloride    Tablet ER 40 milliEquivalent(s) Oral once  venlafaxine XR. 150 milliGRAM(s) Oral daily    MEDICATIONS  (PRN):  acetaminophen   Tablet .. 650 milliGRAM(s) Oral every 6 hours PRN Temp greater or equal to 38C (100.4F), Mild Pain (1 - 3)  melatonin 3 milliGRAM(s) Oral at bedtime PRN Insomnia  ondansetron Injectable 4 milliGRAM(s) IV Push every 8 hours PRN Nausea and/or Vomiting  sodium chloride 0.65% Nasal 1 Spray(s) Both Nostrils two times a day PRN Nasal Congestion      Vital Signs Last 24 Hrs  T(C): 36.4 (12 Oct 2021 04:25), Max: 36.7 (11 Oct 2021 11:59)  T(F): 97.5 (12 Oct 2021 04:25), Max: 98.1 (11 Oct 2021 11:59)  HR: 67 (12 Oct 2021 08:46) (59 - 75)  BP: 164/77 (12 Oct 2021 08:46) (143/67 - 177/81)  BP(mean): --  RR: 18 (12 Oct 2021 04:25) (18 - 19)  SpO2: 94% (12 Oct 2021 04:25) (89% - 97%)    PHYSICAL EXAM  General: well developed  well nourished, disheveled, up in chair, in no acute distress  Head: atraumatic, normocephalic  ENT: sclera anicteric, buccal mucosa moist  Neck: supple, trachea midline  CV: S1 S2, regular rate and rhythm  Lungs: clear to auscultation, no wheezes/rhonchi  Abdomen: soft, nontender, bowel sounds present, no palpable masses  Extrem: no clubbing/cyanosis/edema  Skin: no significant increased ecchymosis/petechiae  Neuro: alert and oriented X3,  no focal deficits      LABS:             8.0    5.77  )-----------( 248      ( 10-12 @ 07:27 )             26.5                8.7    7.04  )-----------( 250      ( 10-11 @ 06:48 )             28.8                8.6    6.70  )-----------( 245      ( 10-10 @ 08:56 )             28.4     mmunoglobulins Panel (10.08.21 @ 23:37)   Quantitative Ig mg/dL   Quantitative IgA: 110 mg/dL   Quantitative IgM: 42 mg/dL   [place]/Lambda Free Light Chain Ratio, Serum: 1.66 Ratio Immunoglobulins Panel (10.08.21 @ 23:37)   ADIA Kappa: 8.25 mg/dL   [place]/Lambda Free Light Chain Ratio, Serum: 1.66 Ratio ADIA Lambda: 4.98 mg/dL (10.08.21 @ 23:37)   10-12    140  |  110<H>  |  39<H>  ----------------------------<  102<H>  3.4<L>   |  23  |  2.80<H>    Ca    8.7      12 Oct 2021 07:27    TPro  6.4  /  Alb  2.7<L>  /  TBili  0.2  /  DBili  x   /  AST  11<L>  /  ALT  18  /  AlkPhos  36<L>  10-12        RADIOLOGY & ADDITIONAL STUDIES:    IMPRESSION/RECOMMENDATIONS:

## 2021-10-12 NOTE — PROGRESS NOTE ADULT - ASSESSMENT
[ASSESSMENT and  PLAN]  77yo M admitted with HTN urgency,  /100 and multilobar pneumonia; history of GI bleed with diverticulosis; also noted to have renal insufficiency    - empirically started on oral B12 and folate; but levels replete and stopped  - iron studies c/w iron deficiency; started on IV iron therapy  - remains on  Apixiban 5mg q12h for Afib  - serum immunofixation with weak IgG-kappa, normal IgG/A/M, both kappa and lambda elevated and k/l ratio sl elevated. Findings more c/w MGUS or reactive, can be monitored serially as outpatient  - symptomatic management from Heme standpoint

## 2021-10-12 NOTE — BH CONSULTATION LIAISON ASSESSMENT NOTE - NSBHCHARTREVIEWLAB_PSY_A_CORE FT
8.0    5.77  )-----------( 248      ( 12 Oct 2021 07:27 )             26.5   10-12    140  |  110<H>  |  39<H>  ----------------------------<  102<H>  3.4<L>   |  23  |  2.80<H>    Ca    8.7      12 Oct 2021 07:27    TPro  6.4  /  Alb  2.7<L>  /  TBili  0.2  /  DBili  x   /  AST  11<L>  /  ALT  18  /  AlkPhos  36<L>  10-12

## 2021-10-12 NOTE — BH CONSULTATION LIAISON ASSESSMENT NOTE - NSBHCHARTREVIEWVS_PSY_A_CORE FT
Vital Signs Last 24 Hrs  T(C): 36.7 (12 Oct 2021 11:55), Max: 36.7 (12 Oct 2021 11:55)  T(F): 98.1 (12 Oct 2021 11:55), Max: 98.1 (12 Oct 2021 11:55)  HR: 70 (12 Oct 2021 11:55) (59 - 70)  BP: 156/74 (12 Oct 2021 11:55) (150/65 - 177/81)  BP(mean): --  RR: 18 (12 Oct 2021 11:55) (18 - 19)  SpO2: 93% (12 Oct 2021 11:55) (93% - 97%)

## 2021-10-12 NOTE — CONSULT NOTE ADULT - CONSULT REQUESTED DATE/TIME
07-Oct-2021 03:35
07-Oct-2021 19:00
07-Oct-2021 03:56
07-Oct-2021 09:18
12-Oct-2021 08:29
07-Oct-2021 09:58
08-Oct-2021 15:32

## 2021-10-12 NOTE — PROGRESS NOTE ADULT - PROBLEM SELECTOR PLAN 6
H/H 10.1/32 on admission, baseline hemoglobin 8.0-9.0  - Currently hemodynamically stable, likely related to CKD   - Iron studies on 07/21 demonstrated iron deficiency anemia   - F/u AM CBC, Follow w /up R/O -Plasma cell disorder  -D/W Dr Rob H/H 10.1/32 on admission, baseline hemoglobin 8.0-9.0  - Currently hemodynamically stable, likely related to CKD   - Iron studies on 07/21 demonstrated iron deficiency anemia   - F/u AM CBC, Follow w /up R/O -Plasma cell disorder  -C3, C4, dsDNA, albumin/globulin ratio, quantitative IgG WNL.  -D/W Dr Rob

## 2021-10-12 NOTE — PROGRESS NOTE ADULT - ATTENDING COMMENTS
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 Patient admitted for hypertensive urgency, Afib and PNA.   pt seen, examined case & care plan d/w pt, residents at detail.  AM labs   PO diet   -ID-Dr Garner- continue Abx   Cardio DR Gamboa-follow up  -PT---> LETI  -Total care time 40 minutes.

## 2021-10-12 NOTE — BH CONSULTATION LIAISON ASSESSMENT NOTE - NSBHREFERDETAILS_PSY_A_CORE_FT
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 (233 1) (D06 9)   4  Contraception (V25 9) (Z30 9)   5  Headache (784 0) (R51)   6  Preoperative exam for gynecologic surgery (V72 83) (Z01 818)   7  Preoperative testing (V72 84) (Z01 818)   8  Sterilization consult (V25 09) (Z30 09)   9  Urinary pain (788 1) (R30 9)    Current Meds   1  Microgestin FE 1 5/30 1 5-30 MG-MCG Oral Tablet; TAKE 1 TABLET DAILY AS   DIRECTED; Therapy: [id number] to (Evaluate:[id number])  Requested for: [id number]; Last   Rx:[id number] Ordered    Allergies    1  No Known Drug Allergies    2  No Known Environmental Allergies   3   No Known Food Allergies    Signatures   Electronically signed by : Houston Alvarez RN; Jun 23 2017 10:19AM EST                       (Author) Patient has a history of depression

## 2021-10-12 NOTE — PROGRESS NOTE ADULT - SUBJECTIVE AND OBJECTIVE BOX
Patient is a 76y old  Male who presents with a chief complaint of multilobar PNA, afib (11 Oct 2021 14:23)  Patient seen in follow up for CKD 4.        PAST MEDICAL HISTORY:  Hypertension    Diabetes mellitus    Lupus    Hepatitis C    Anxiety and depression    CAD (coronary artery disease)    Diverticulosis    Hyperlipidemia    HTN (hypertension)    HLD (hyperlipidemia)    Atrial fibrillation    CAD (coronary artery disease)    Type 2 diabetes mellitus    Anxiety    History of diverticulitis    Diverticulosis      MEDICATIONS  (STANDING):  amLODIPine   Tablet 10 milliGRAM(s) Oral daily  apixaban 5 milliGRAM(s) Oral every 12 hours  ARIPiprazole 30 milliGRAM(s) Oral daily  aspirin enteric coated 81 milliGRAM(s) Oral daily  budesonide 160 MICROgram(s)/formoterol 4.5 MICROgram(s) Inhaler 2 Puff(s) Inhalation two times a day  cloNIDine 0.2 milliGRAM(s) Oral two times a day  cyanocobalamin 1000 MICROGram(s) Oral daily  dextrose 40% Gel 15 Gram(s) Oral once  dextrose 5%. 1000 milliLiter(s) (50 mL/Hr) IV Continuous <Continuous>  dextrose 50% Injectable 25 Gram(s) IV Push once  doxazosin 4 milliGRAM(s) Oral <User Schedule>  famotidine    Tablet 20 milliGRAM(s) Oral every 48 hours  folic acid 1 milliGRAM(s) Oral daily  glucagon  Injectable 1 milliGRAM(s) IntraMuscular once  hydrALAZINE 100 milliGRAM(s) Oral every 8 hours  insulin lispro (ADMELOG) corrective regimen sliding scale   SubCutaneous three times a day before meals  lamoTRIgine 100 milliGRAM(s) Oral daily  mirtazapine 30 milliGRAM(s) Oral at bedtime  nystatin Powder 1 Application(s) Topical two times a day  oxybutynin 5 milliGRAM(s) Oral four times a day  pantoprazole    Tablet 40 milliGRAM(s) Oral before breakfast  piperacillin/tazobactam IVPB.. 3.375 Gram(s) IV Intermittent every 12 hours  potassium chloride    Tablet ER 40 milliEquivalent(s) Oral once  venlafaxine XR. 150 milliGRAM(s) Oral daily    MEDICATIONS  (PRN):  acetaminophen   Tablet .. 650 milliGRAM(s) Oral every 6 hours PRN Temp greater or equal to 38C (100.4F), Mild Pain (1 - 3)  melatonin 3 milliGRAM(s) Oral at bedtime PRN Insomnia  ondansetron Injectable 4 milliGRAM(s) IV Push every 8 hours PRN Nausea and/or Vomiting  sodium chloride 0.65% Nasal 1 Spray(s) Both Nostrils two times a day PRN Nasal Congestion    T(C): 36.7 (10-12-21 @ 11:55), Max: 36.7 (10-11-21 @ 11:59)  HR: 70 (10-12-21 @ 11:55) (58 - 75)  BP: 156/74 (10-12-21 @ 11:55) (131/69 - 177/81)  RR: 18 (10-12-21 @ 11:55)  SpO2: 93% (10-12-21 @ 11:55)  Wt(kg): --  I&O's Detail        PHYSICAL EXAM:  General: No distress  Respiratory: b/l air entry  Cardiovascular: S1 S2  Gastrointestinal: soft  Extremities:  + edema                           LABORATORY:                        8.0    5.77  )-----------( 248      ( 12 Oct 2021 07:27 )             26.5     10-12    140  |  110<H>  |  39<H>  ----------------------------<  102<H>  3.4<L>   |  23  |  2.80<H>    Ca    8.7      12 Oct 2021 07:27    TPro  6.4  /  Alb  2.7<L>  /  TBili  0.2  /  DBili  x   /  AST  11<L>  /  ALT  18  /  AlkPhos  36<L>  10-12    Sodium, Serum: 140 mmol/L (10-12 @ 07:27)  Sodium, Serum: 139 mmol/L (10-11 @ 06:48)    Potassium, Serum: 3.4 mmol/L (10-12 @ 07:27)  Potassium, Serum: 3.5 mmol/L (10-11 @ 06:48)    Hemoglobin: 8.0 g/dL (10-12 @ 07:27)  Hemoglobin: 8.7 g/dL (10-11 @ 06:48)  Hemoglobin: 8.6 g/dL (10-10 @ 08:56)    Creatinine, Serum 2.80 (10-12 @ 07:27)  Creatinine, Serum 2.90 (10-11 @ 06:48)  Creatinine, Serum 3.20 (10-10 @ 08:57)        LIVER FUNCTIONS - ( 12 Oct 2021 07:27 )  Alb: 2.7 g/dL / Pro: 6.4 g/dL / ALK PHOS: 36 U/L / ALT: 18 U/L / AST: 11 U/L / GGT: x

## 2021-10-13 ENCOUNTER — TRANSCRIPTION ENCOUNTER (OUTPATIENT)
Age: 76
End: 2021-10-13

## 2021-10-13 LAB
ALBUMIN SERPL ELPH-MCNC: 2.9 G/DL — LOW (ref 3.3–5)
ALP SERPL-CCNC: 29 U/L — LOW (ref 40–120)
ALT FLD-CCNC: 20 U/L — SIGNIFICANT CHANGE UP (ref 12–78)
ANION GAP SERPL CALC-SCNC: 8 MMOL/L — SIGNIFICANT CHANGE UP (ref 5–17)
AST SERPL-CCNC: 14 U/L — LOW (ref 15–37)
BASOPHILS # BLD AUTO: 0.02 K/UL — SIGNIFICANT CHANGE UP (ref 0–0.2)
BASOPHILS NFR BLD AUTO: 0.3 % — SIGNIFICANT CHANGE UP (ref 0–2)
BILIRUB SERPL-MCNC: 0.3 MG/DL — SIGNIFICANT CHANGE UP (ref 0.2–1.2)
BUN SERPL-MCNC: 35 MG/DL — HIGH (ref 7–23)
CALCIUM SERPL-MCNC: 8.7 MG/DL — SIGNIFICANT CHANGE UP (ref 8.5–10.1)
CHLORIDE SERPL-SCNC: 108 MMOL/L — SIGNIFICANT CHANGE UP (ref 96–108)
CK MB BLD-MCNC: 2.4 % — SIGNIFICANT CHANGE UP (ref 0–3.5)
CK MB BLD-MCNC: 2.4 % — SIGNIFICANT CHANGE UP (ref 0–3.5)
CK MB BLD-MCNC: 2.6 % — SIGNIFICANT CHANGE UP (ref 0–3.5)
CK MB BLD-MCNC: 3.1 % — SIGNIFICANT CHANGE UP (ref 0–3.5)
CK MB CFR SERPL CALC: 1.2 NG/ML — SIGNIFICANT CHANGE UP (ref 0–3.6)
CK MB CFR SERPL CALC: 1.6 NG/ML — SIGNIFICANT CHANGE UP (ref 0–3.6)
CK SERPL-CCNC: 46 U/L — SIGNIFICANT CHANGE UP (ref 26–308)
CK SERPL-CCNC: 51 U/L — SIGNIFICANT CHANGE UP (ref 26–308)
CO2 SERPL-SCNC: 23 MMOL/L — SIGNIFICANT CHANGE UP (ref 22–31)
CREAT SERPL-MCNC: 2.9 MG/DL — HIGH (ref 0.5–1.3)
EOSINOPHIL # BLD AUTO: 0.14 K/UL — SIGNIFICANT CHANGE UP (ref 0–0.5)
EOSINOPHIL NFR BLD AUTO: 1.9 % — SIGNIFICANT CHANGE UP (ref 0–6)
GLUCOSE SERPL-MCNC: 150 MG/DL — HIGH (ref 70–99)
HCT VFR BLD CALC: 28 % — LOW (ref 39–50)
HGB BLD-MCNC: 8.3 G/DL — LOW (ref 13–17)
IMM GRANULOCYTES NFR BLD AUTO: 0.5 % — SIGNIFICANT CHANGE UP (ref 0–1.5)
LYMPHOCYTES # BLD AUTO: 0.6 K/UL — LOW (ref 1–3.3)
LYMPHOCYTES # BLD AUTO: 8 % — LOW (ref 13–44)
MCHC RBC-ENTMCNC: 25.7 PG — LOW (ref 27–34)
MCHC RBC-ENTMCNC: 29.6 GM/DL — LOW (ref 32–36)
MCV RBC AUTO: 86.7 FL — SIGNIFICANT CHANGE UP (ref 80–100)
MONOCYTES # BLD AUTO: 0.44 K/UL — SIGNIFICANT CHANGE UP (ref 0–0.9)
MONOCYTES NFR BLD AUTO: 5.8 % — SIGNIFICANT CHANGE UP (ref 2–14)
NEUTROPHILS # BLD AUTO: 6.29 K/UL — SIGNIFICANT CHANGE UP (ref 1.8–7.4)
NEUTROPHILS NFR BLD AUTO: 83.5 % — HIGH (ref 43–77)
NRBC # BLD: 0 /100 WBCS — SIGNIFICANT CHANGE UP (ref 0–0)
PLATELET # BLD AUTO: 269 K/UL — SIGNIFICANT CHANGE UP (ref 150–400)
POTASSIUM SERPL-MCNC: 3.7 MMOL/L — SIGNIFICANT CHANGE UP (ref 3.5–5.3)
POTASSIUM SERPL-SCNC: 3.7 MMOL/L — SIGNIFICANT CHANGE UP (ref 3.5–5.3)
PROT SERPL-MCNC: 6.9 G/DL — SIGNIFICANT CHANGE UP (ref 6–8.3)
RBC # BLD: 3.23 M/UL — LOW (ref 4.2–5.8)
RBC # FLD: 17.3 % — HIGH (ref 10.3–14.5)
SARS-COV-2 RNA SPEC QL NAA+PROBE: SIGNIFICANT CHANGE UP
SODIUM SERPL-SCNC: 139 MMOL/L — SIGNIFICANT CHANGE UP (ref 135–145)
TROPONIN I SERPL-MCNC: 0.04 NG/ML — SIGNIFICANT CHANGE UP (ref 0.01–0.04)
TROPONIN I SERPL-MCNC: 0.05 NG/ML — HIGH (ref 0.01–0.04)
TROPONIN I SERPL-MCNC: 0.06 NG/ML — HIGH (ref 0.01–0.04)
TROPONIN I SERPL-MCNC: 0.06 NG/ML — HIGH (ref 0.01–0.04)
WBC # BLD: 7.53 K/UL — SIGNIFICANT CHANGE UP (ref 3.8–10.5)
WBC # FLD AUTO: 7.53 K/UL — SIGNIFICANT CHANGE UP (ref 3.8–10.5)

## 2021-10-13 PROCEDURE — 93010 ELECTROCARDIOGRAM REPORT: CPT

## 2021-10-13 RX ORDER — HYDRALAZINE HCL 50 MG
10 TABLET ORAL ONCE
Refills: 0 | Status: COMPLETED | OUTPATIENT
Start: 2021-10-13 | End: 2021-10-13

## 2021-10-13 RX ORDER — ERYTHROPOIETIN 10000 [IU]/ML
10000 INJECTION, SOLUTION INTRAVENOUS; SUBCUTANEOUS ONCE
Refills: 0 | Status: COMPLETED | OUTPATIENT
Start: 2021-10-13 | End: 2021-10-13

## 2021-10-13 RX ORDER — ISOSORBIDE MONONITRATE 60 MG/1
30 TABLET, EXTENDED RELEASE ORAL DAILY
Refills: 0 | Status: DISCONTINUED | OUTPATIENT
Start: 2021-10-13 | End: 2021-10-16

## 2021-10-13 RX ORDER — NIFEDIPINE 30 MG
30 TABLET, EXTENDED RELEASE 24 HR ORAL DAILY
Refills: 0 | Status: DISCONTINUED | OUTPATIENT
Start: 2021-10-13 | End: 2021-10-16

## 2021-10-13 RX ORDER — FUROSEMIDE 40 MG
20 TABLET ORAL DAILY
Refills: 0 | Status: DISCONTINUED | OUTPATIENT
Start: 2021-10-13 | End: 2021-10-15

## 2021-10-13 RX ADMIN — ARIPIPRAZOLE 30 MILLIGRAM(S): 15 TABLET ORAL at 12:33

## 2021-10-13 RX ADMIN — APIXABAN 5 MILLIGRAM(S): 2.5 TABLET, FILM COATED ORAL at 05:17

## 2021-10-13 RX ADMIN — Medication 20 MILLIGRAM(S): at 20:03

## 2021-10-13 RX ADMIN — NYSTATIN CREAM 1 APPLICATION(S): 100000 CREAM TOPICAL at 05:17

## 2021-10-13 RX ADMIN — Medication 1 TABLET(S): at 05:17

## 2021-10-13 RX ADMIN — Medication 150 MILLIGRAM(S): at 12:33

## 2021-10-13 RX ADMIN — Medication 0.1 MILLIGRAM(S): at 16:10

## 2021-10-13 RX ADMIN — BUDESONIDE AND FORMOTEROL FUMARATE DIHYDRATE 2 PUFF(S): 160; 4.5 AEROSOL RESPIRATORY (INHALATION) at 18:42

## 2021-10-13 RX ADMIN — Medication 5 MILLIGRAM(S): at 05:17

## 2021-10-13 RX ADMIN — Medication 5 MILLIGRAM(S): at 21:33

## 2021-10-13 RX ADMIN — Medication 1 TABLET(S): at 18:42

## 2021-10-13 RX ADMIN — Medication 650 MILLIGRAM(S): at 07:02

## 2021-10-13 RX ADMIN — FAMOTIDINE 20 MILLIGRAM(S): 10 INJECTION INTRAVENOUS at 05:17

## 2021-10-13 RX ADMIN — Medication 10 MILLIGRAM(S): at 13:53

## 2021-10-13 RX ADMIN — NYSTATIN CREAM 1 APPLICATION(S): 100000 CREAM TOPICAL at 18:43

## 2021-10-13 RX ADMIN — Medication 5 MILLIGRAM(S): at 12:34

## 2021-10-13 RX ADMIN — Medication 30 MILLIGRAM(S): at 23:51

## 2021-10-13 RX ADMIN — Medication 5 MILLIGRAM(S): at 18:42

## 2021-10-13 RX ADMIN — Medication 650 MILLIGRAM(S): at 16:00

## 2021-10-13 RX ADMIN — BUDESONIDE AND FORMOTEROL FUMARATE DIHYDRATE 2 PUFF(S): 160; 4.5 AEROSOL RESPIRATORY (INHALATION) at 05:17

## 2021-10-13 RX ADMIN — Medication 100 MILLIGRAM(S): at 21:32

## 2021-10-13 RX ADMIN — BUDESONIDE AND FORMOTEROL FUMARATE DIHYDRATE 2 PUFF(S): 160; 4.5 AEROSOL RESPIRATORY (INHALATION) at 12:34

## 2021-10-13 RX ADMIN — PREGABALIN 1000 MICROGRAM(S): 225 CAPSULE ORAL at 12:34

## 2021-10-13 RX ADMIN — ISOSORBIDE MONONITRATE 30 MILLIGRAM(S): 60 TABLET, EXTENDED RELEASE ORAL at 23:51

## 2021-10-13 RX ADMIN — LAMOTRIGINE 100 MILLIGRAM(S): 25 TABLET, ORALLY DISINTEGRATING ORAL at 12:35

## 2021-10-13 RX ADMIN — APIXABAN 5 MILLIGRAM(S): 2.5 TABLET, FILM COATED ORAL at 18:42

## 2021-10-13 RX ADMIN — Medication 650 MILLIGRAM(S): at 13:53

## 2021-10-13 RX ADMIN — Medication 0.1 MILLIGRAM(S): at 21:32

## 2021-10-13 RX ADMIN — Medication 81 MILLIGRAM(S): at 12:33

## 2021-10-13 RX ADMIN — AMLODIPINE BESYLATE 10 MILLIGRAM(S): 2.5 TABLET ORAL at 05:17

## 2021-10-13 RX ADMIN — Medication 1 MILLIGRAM(S): at 12:34

## 2021-10-13 RX ADMIN — Medication 650 MILLIGRAM(S): at 06:05

## 2021-10-13 RX ADMIN — Medication 100 MILLIGRAM(S): at 05:17

## 2021-10-13 RX ADMIN — MIRTAZAPINE 30 MILLIGRAM(S): 45 TABLET, ORALLY DISINTEGRATING ORAL at 21:32

## 2021-10-13 RX ADMIN — Medication 100 MILLIGRAM(S): at 13:53

## 2021-10-13 RX ADMIN — Medication 8 MILLIGRAM(S): at 21:32

## 2021-10-13 RX ADMIN — PANTOPRAZOLE SODIUM 40 MILLIGRAM(S): 20 TABLET, DELAYED RELEASE ORAL at 05:17

## 2021-10-13 RX ADMIN — ERYTHROPOIETIN 10000 UNIT(S): 10000 INJECTION, SOLUTION INTRAVENOUS; SUBCUTANEOUS at 21:36

## 2021-10-13 NOTE — PROGRESS NOTE ADULT - SUBJECTIVE AND OBJECTIVE BOX
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 house presenting to [facility] today with multiple concerns including subjective fevers, SOB, weakness, and brief episodes of palpitations, and urinary frequency "for at least 3 days". Pt states he would intermittently feel chills and feverish, recorded no temps at group home since last couple of days, he states progressively worsening of dyspnea on exertion and rest, patient unable to walk short distances or climb stairs due to dyspnea, however denies orthopnea, cough, sputum production, night sweats. Patient mentioned some dizziness associated with lower extremities weakness, usually ambulates independently but now feels unable to hold his weight. Regarding palpitations, pt states for 3 days his heart would skip a beat, return to normal. Self limited episodes under a minute, no associated chest pain. Pt also describes urinary frequency beyond baseline, denies dysuria or incontinence, hematuria, constipation.     ED course:   VS: Afebrile, HR: 80 BP: 220/100->235/116-> 189/85, Spo2: 98 on NC RR: 20  Labs significant for low stable hemoglobin, BUN/creatinine: 36/2.30, Trop 0.149, Pro BMP 8061.   EKG on admission showed Afib @ 64 bpm, incompleta right BBB, LVH  CT head shows no  evidence of acute intracranial hemorrhage, midline shift or CT evidence of acute territorial infarct.  CT chest A/P demonstrated Multilobar pneumonia. Mild cardiomegaly. No mall bowel obstruction or active bowel inflammation.  Patient was given in the ED 10 mg IVP hydralazine 10 mg Labetalol IVP 1x, 2000 cc bolus NS 1x            (07 Oct 2021 01:24)        SUBJECTIVE:      ALLERGIES:  Allergies    No Known Allergies    Intolerances          MEDICATIONS  (STANDING):  amLODIPine   Tablet 10 milliGRAM(s) Oral daily  amoxicillin  500 milliGRAM(s)/clavulanate 1 Tablet(s) Oral two times a day  apixaban 5 milliGRAM(s) Oral every 12 hours  ARIPiprazole 30 milliGRAM(s) Oral daily  aspirin enteric coated 81 milliGRAM(s) Oral daily  budesonide 160 MICROgram(s)/formoterol 4.5 MICROgram(s) Inhaler 2 Puff(s) Inhalation two times a day  cloNIDine 0.1 milliGRAM(s) Oral three times a day  cyanocobalamin 1000 MICROGram(s) Oral daily  dextrose 40% Gel 15 Gram(s) Oral once  dextrose 5%. 1000 milliLiter(s) (50 mL/Hr) IV Continuous <Continuous>  dextrose 50% Injectable 25 Gram(s) IV Push once  doxazosin 8 milliGRAM(s) Oral at bedtime  famotidine    Tablet 20 milliGRAM(s) Oral every 48 hours  folic acid 1 milliGRAM(s) Oral daily  furosemide    Tablet 20 milliGRAM(s) Oral daily  glucagon  Injectable 1 milliGRAM(s) IntraMuscular once  hydrALAZINE 100 milliGRAM(s) Oral every 8 hours  insulin lispro (ADMELOG) corrective regimen sliding scale   SubCutaneous three times a day before meals  lamoTRIgine 100 milliGRAM(s) Oral daily  mirtazapine 30 milliGRAM(s) Oral at bedtime  nystatin Powder 1 Application(s) Topical two times a day  oxybutynin 5 milliGRAM(s) Oral four times a day  pantoprazole    Tablet 40 milliGRAM(s) Oral before breakfast  venlafaxine XR. 150 milliGRAM(s) Oral daily    MEDICATIONS  (PRN):  acetaminophen   Tablet .. 650 milliGRAM(s) Oral every 6 hours PRN Temp greater or equal to 38C (100.4F), Mild Pain (1 - 3)  melatonin 3 milliGRAM(s) Oral at bedtime PRN Insomnia  ondansetron Injectable 4 milliGRAM(s) IV Push every 8 hours PRN Nausea and/or Vomiting  sodium chloride 0.65% Nasal 1 Spray(s) Both Nostrils two times a day PRN Nasal Congestion      REVIEW OF SYSTEMS:  CONSTITUTIONAL: No fever,  RESPIRATORY: No cough, wheezing, shortness of breath  CARDIOVASCULAR: No chest pain, dyspnea, palpitations, dizziness, syncope, paroxysmal nocturnal dyspnea, orthopnea, or arm or leg swelling  GASTROINTESTINAL: No abdominal  or epigastric pain, nausea, vomiting,  diarrhea  NEUROLOGICAL: No headaches,  loss of strength, numbness, or tremors    Vital Signs Last 24 Hrs  T(C): 36.6 (13 Oct 2021 18:51), Max: 36.7 (13 Oct 2021 09:11)  T(F): 97.9 (13 Oct 2021 18:51), Max: 98 (13 Oct 2021 09:11)  HR: 74 (13 Oct 2021 18:51) (67 - 77)  BP: 166/81 (13 Oct 2021 18:51) (157/69 - 197/76)  BP(mean): --  RR: 18 (13 Oct 2021 18:51) (15 - 18)  SpO2: 96% (13 Oct 2021 18:51) (91% - 96%)    PHYSICAL EXAM:  HEAD:  Atraumatic, Normocephalic  NECK: Supple and normal thyroid.  No JVD or carotid bruit.   HEART: S1, S2 regular , 1/6 soft ejection systolic murmur in mitral area , no thrill and no gallops .  PULMONARY: Bilateral vesicular breathing , few scattered ronchi and few scattered rales are present .  ABDOMEN: Soft nontender and positive bowl sounds   EXTREMITIES:  No clubbing, cyanosis, or pedal  edema  NEUROLOGICAL: AAOX3 , no focal deficit .    LABS:                        8.3    7.53  )-----------( 269      ( 13 Oct 2021 06:58 )             28.0     10-13    139  |  108  |  35<H>  ----------------------------<  150<H>  3.7   |  23  |  2.90<H>    Ca    8.7      13 Oct 2021 07:01    TPro  6.9  /  Alb  2.9<L>  /  TBili  0.3  /  DBili  x   /  AST  14<L>  /  ALT  20  /  AlkPhos  29<L>  10-13    CARDIAC MARKERS ( 13 Oct 2021 19:03 )  .064 ng/mL / x     / 51 U/L / x     / 1.6 ng/mL  CARDIAC MARKERS ( 13 Oct 2021 13:51 )  .063 ng/mL / x     / 51 U/L / x     / 1.2 ng/mL  CARDIAC MARKERS ( 13 Oct 2021 10:41 )  .053 ng/mL / x     / 46 U/L / x     / 1.2 ng/mL  CARDIAC MARKERS ( 13 Oct 2021 07:01 )  .035 ng/mL / x     / 51 U/L / x     / 1.2 ng/mL          BNP      EKG:  ECHO:  IMAGING:    Assessment/Plan    Will continue to follow during hospital course and give further recommendations as needed . Thanks for your referral . 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 house presenting to [facility] today with multiple concerns including subjective fevers, SOB, weakness, and brief episodes of palpitations, and urinary frequency "for at least 3 days". Pt states he would intermittently feel chills and feverish, recorded no temps at group home since last couple of days, he states progressively worsening of dyspnea on exertion and rest, patient unable to walk short distances or climb stairs due to dyspnea, however denies orthopnea, cough, sputum production, night sweats. Patient mentioned some dizziness associated with lower extremities weakness, usually ambulates independently but now feels unable to hold his weight. Regarding palpitations, pt states for 3 days his heart would skip a beat, return to normal. Self limited episodes under a minute, no associated chest pain. Pt also describes urinary frequency beyond baseline, denies dysuria or incontinence, hematuria, constipation.     ED course:   VS: Afebrile, HR: 80 BP: 220/100->235/116-> 189/85, Spo2: 98 on NC RR: 20  Labs significant for low stable hemoglobin, BUN/creatinine: 36/2.30, Trop 0.149, Pro BMP 8061.   EKG on admission showed Afib @ 64 bpm, incompleta right BBB, LVH  CT head shows no  evidence of acute intracranial hemorrhage, midline shift or CT evidence of acute territorial infarct.  CT chest A/P demonstrated Multilobar pneumonia. Mild cardiomegaly. No mall bowel obstruction or active bowel inflammation.  Patient was given in the ED 10 mg IVP hydralazine 10 mg Labetalol IVP 1x, 2000 cc bolus NS 1x            (07 Oct 2021 01:24)        SUBJECTIVE:      ALLERGIES:  Allergies    No Known Allergies    Intolerances          MEDICATIONS  (STANDING):  amLODIPine   Tablet 10 milliGRAM(s) Oral daily  amoxicillin  500 milliGRAM(s)/clavulanate 1 Tablet(s) Oral two times a day  apixaban 5 milliGRAM(s) Oral every 12 hours  ARIPiprazole 30 milliGRAM(s) Oral daily  aspirin enteric coated 81 milliGRAM(s) Oral daily  budesonide 160 MICROgram(s)/formoterol 4.5 MICROgram(s) Inhaler 2 Puff(s) Inhalation two times a day  cloNIDine 0.1 milliGRAM(s) Oral three times a day  cyanocobalamin 1000 MICROGram(s) Oral daily  dextrose 40% Gel 15 Gram(s) Oral once  dextrose 5%. 1000 milliLiter(s) (50 mL/Hr) IV Continuous <Continuous>  dextrose 50% Injectable 25 Gram(s) IV Push once  doxazosin 8 milliGRAM(s) Oral at bedtime  famotidine    Tablet 20 milliGRAM(s) Oral every 48 hours  folic acid 1 milliGRAM(s) Oral daily  furosemide    Tablet 20 milliGRAM(s) Oral daily  glucagon  Injectable 1 milliGRAM(s) IntraMuscular once  hydrALAZINE 100 milliGRAM(s) Oral every 8 hours  insulin lispro (ADMELOG) corrective regimen sliding scale   SubCutaneous three times a day before meals  lamoTRIgine 100 milliGRAM(s) Oral daily  mirtazapine 30 milliGRAM(s) Oral at bedtime  nystatin Powder 1 Application(s) Topical two times a day  oxybutynin 5 milliGRAM(s) Oral four times a day  pantoprazole    Tablet 40 milliGRAM(s) Oral before breakfast  venlafaxine XR. 150 milliGRAM(s) Oral daily    MEDICATIONS  (PRN):  acetaminophen   Tablet .. 650 milliGRAM(s) Oral every 6 hours PRN Temp greater or equal to 38C (100.4F), Mild Pain (1 - 3)  melatonin 3 milliGRAM(s) Oral at bedtime PRN Insomnia  ondansetron Injectable 4 milliGRAM(s) IV Push every 8 hours PRN Nausea and/or Vomiting  sodium chloride 0.65% Nasal 1 Spray(s) Both Nostrils two times a day PRN Nasal Congestion      REVIEW OF SYSTEMS:  CONSTITUTIONAL: No fever,  RESPIRATORY: No cough, wheezing, shortness of breath  CARDIOVASCULAR: No chest pain, dyspnea, palpitations, dizziness, syncope, paroxysmal nocturnal dyspnea, orthopnea, or arm or leg swelling  GASTROINTESTINAL: No abdominal  or epigastric pain, nausea, vomiting,  diarrhea  NEUROLOGICAL: No headaches,  loss of strength, numbness, or tremors    Vital Signs Last 24 Hrs  T(C): 36.6 (13 Oct 2021 18:51), Max: 36.7 (13 Oct 2021 09:11)  T(F): 97.9 (13 Oct 2021 18:51), Max: 98 (13 Oct 2021 09:11)  HR: 74 (13 Oct 2021 18:51) (67 - 77)  BP: 166/81 (13 Oct 2021 18:51) (157/69 - 197/76)  BP(mean): --  RR: 18 (13 Oct 2021 18:51) (15 - 18)  SpO2: 96% (13 Oct 2021 18:51) (91% - 96%)    PHYSICAL EXAM:  HEAD:  Atraumatic, Normocephalic  NECK: Supple and normal thyroid.  No JVD or carotid bruit.   HEART: S1, S2 regular , 1/6 soft ejection systolic murmur in mitral area , no thrill and no gallops .  PULMONARY: Bilateral vesicular breathing , few scattered ronchi and few scattered rales are present .  ABDOMEN: Soft nontender and positive bowl sounds   EXTREMITIES:  No clubbing, cyanosis, or pedal  edema  NEUROLOGICAL: AAOX3 , no focal deficit .    LABS:                        8.3    7.53  )-----------( 269      ( 13 Oct 2021 06:58 )             28.0     10-13    139  |  108  |  35<H>  ----------------------------<  150<H>  3.7   |  23  |  2.90<H>    Ca    8.7      13 Oct 2021 07:01    TPro  6.9  /  Alb  2.9<L>  /  TBili  0.3  /  DBili  x   /  AST  14<L>  /  ALT  20  /  AlkPhos  29<L>  10-13    CARDIAC MARKERS ( 13 Oct 2021 19:03 )  .064 ng/mL / x     / 51 U/L / x     / 1.6 ng/mL  CARDIAC MARKERS ( 13 Oct 2021 13:51 )  .063 ng/mL / x     / 51 U/L / x     / 1.2 ng/mL  CARDIAC MARKERS ( 13 Oct 2021 10:41 )  .053 ng/mL / x     / 46 U/L / x     / 1.2 ng/mL  CARDIAC MARKERS ( 13 Oct 2021 07:01 )  .035 ng/mL / x     / 51 U/L / x     / 1.2 ng/mL          BNP      EKG:  ECHO:  IMAGING:    Assessment/Plan    Will continue to follow during hospital course and give further recommendations as needed . Thanks for your referral . 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 Pt states he would intermittently feel chills and feverish, recorded no temps at group home since last couple of days, he states progressively worsening of dyspnea on exertion and rest, patient unable to walk short distances or climb stairs due to dyspnea, however denies orthopnea, cough, sputum production, night sweats. Patient mentioned some dizziness associated with lower extremities weakness, usually ambulates independently but now feels unable to hold his weight. Regarding palpitations, pt states for 3 days his heart would skip a beat, return to normal. Self limited episodes under a minute, no associated chest pain. Pt also describes urinary frequency beyond baseline, denies dysuria or incontinence, hematuria, constipation.     ED course:   VS: Afebrile, HR: 80 BP: 220/100->235/116-> 189/85, Spo2: 98 on NC RR: 20  Labs significant for low stable hemoglobin, BUN/creatinine: 36/2.30, Trop 0.149, Pro BMP 8061.   EKG on admission showed Afib @ 64 bpm, incompleta right BBB, LVH  CT head shows no  evidence of acute intracranial hemorrhage, midline shift or CT evidence of acute territorial infarct.  CT chest A/P demonstrated Multilobar pneumonia. Mild cardiomegaly. No mall bowel obstruction or active bowel inflammation.  Patient was given in the ED 10 mg IVP hydralazine 10 mg Labetalol IVP 1x, 2000 cc bolus NS 1x            (07 Oct 2021 01:24)        SUBJECTIVE:      ALLERGIES:  Allergies    No Known Allergies    Intolerances          MEDICATIONS  (STANDING):  amLODIPine   Tablet 10 milliGRAM(s) Oral daily  amoxicillin  500 milliGRAM(s)/clavulanate 1 Tablet(s) Oral two times a day  apixaban 5 milliGRAM(s) Oral every 12 hours  ARIPiprazole 30 milliGRAM(s) Oral daily  aspirin enteric coated 81 milliGRAM(s) Oral daily  budesonide 160 MICROgram(s)/formoterol 4.5 MICROgram(s) Inhaler 2 Puff(s) Inhalation two times a day  cloNIDine 0.1 milliGRAM(s) Oral three times a day  cyanocobalamin 1000 MICROGram(s) Oral daily  dextrose 40% Gel 15 Gram(s) Oral once  dextrose 5%. 1000 milliLiter(s) (50 mL/Hr) IV Continuous <Continuous>  dextrose 50% Injectable 25 Gram(s) IV Push once  doxazosin 8 milliGRAM(s) Oral at bedtime  famotidine    Tablet 20 milliGRAM(s) Oral every 48 hours  folic acid 1 milliGRAM(s) Oral daily  furosemide    Tablet 20 milliGRAM(s) Oral daily  glucagon  Injectable 1 milliGRAM(s) IntraMuscular once  hydrALAZINE 100 milliGRAM(s) Oral every 8 hours  insulin lispro (ADMELOG) corrective regimen sliding scale   SubCutaneous three times a day before meals  lamoTRIgine 100 milliGRAM(s) Oral daily  mirtazapine 30 milliGRAM(s) Oral at bedtime  nystatin Powder 1 Application(s) Topical two times a day  oxybutynin 5 milliGRAM(s) Oral four times a day  pantoprazole    Tablet 40 milliGRAM(s) Oral before breakfast  venlafaxine XR. 150 milliGRAM(s) Oral daily    MEDICATIONS  (PRN):  acetaminophen   Tablet .. 650 milliGRAM(s) Oral every 6 hours PRN Temp greater or equal to 38C (100.4F), Mild Pain (1 - 3)  melatonin 3 milliGRAM(s) Oral at bedtime PRN Insomnia  ondansetron Injectable 4 milliGRAM(s) IV Push every 8 hours PRN Nausea and/or Vomiting  sodium chloride 0.65% Nasal 1 Spray(s) Both Nostrils two times a day PRN Nasal Congestion      REVIEW OF SYSTEMS:  CONSTITUTIONAL: No fever,  RESPIRATORY: Improvement in  cough, wheezing, shortness of breath  CARDIOVASCULAR: Improvement in  chest pain and  dyspnea, No  palpitations, dizziness, syncope, paroxysmal nocturnal dyspnea, and improvement in SOB and  leg swellings .  GASTROINTESTINAL: No abdominal  or epigastric pain, nausea, vomiting,  diarrhea  NEUROLOGICAL: No headaches,  loss of strength, numbness, or tremors  Skin : No itching .  Hematology : No bleeding .  Endocrinology : No heat and cold intolerance .  Psychiatry : Patient is mild anxious .  Musculoskeletal : Patient has mild arthritis .    Vital Signs Last 24 Hrs  T(C): 36.6 (13 Oct 2021 18:51), Max: 36.7 (13 Oct 2021 09:11)  T(F): 97.9 (13 Oct 2021 18:51), Max: 98 (13 Oct 2021 09:11)  HR: 74 (13 Oct 2021 18:51) (67 - 77)  BP: 166/81 (13 Oct 2021 18:51) (157/69 - 197/76)  BP(mean): --  RR: 18 (13 Oct 2021 18:51) (15 - 18)  SpO2: 96% (13 Oct 2021 18:51) (91% - 96%)    PHYSICAL EXAM:  HEAD:  Atraumatic, Normocephalic  NECK: Supple and normal thyroid.  No JVD or carotid bruit.   HEART: S1, S2 irregular , 1/6 soft ejection systolic murmur in mitral area , no thrill and no gallops .  PULMONARY: Bilateral vesicular breathing , few scattered rhonchi and few scattered rales are present .  ABDOMEN: Soft nontender and positive bowl sounds   EXTREMITIES:  No clubbing, cyanosis, and minimal  pedal  edema present .  NEUROLOGICAL: AAOX3 , no focal deficit .  Skin : No rashes .  Musculoskeletal : No joint swellings .    LABS:                        8.3    7.53  )-----------( 269      ( 13 Oct 2021 06:58 )             28.0     10-13    139  |  108  |  35<H>  ----------------------------<  150<H>  3.7   |  23  |  2.90<H>    Ca    8.7      13 Oct 2021 07:01    TPro  6.9  /  Alb  2.9<L>  /  TBili  0.3  /  DBili  x   /  AST  14<L>  /  ALT  20  /  AlkPhos  29<L>  10-13    CARDIAC MARKERS ( 13 Oct 2021 19:03 )  .064 ng/mL / x     / 51 U/L / x     / 1.6 ng/mL  CARDIAC MARKERS ( 13 Oct 2021 13:51 )  .063 ng/mL / x     / 51 U/L / x     / 1.2 ng/mL  CARDIAC MARKERS ( 13 Oct 2021 10:41 )  .053 ng/mL / x     / 46 U/L / x     / 1.2 ng/mL  CARDIAC MARKERS ( 13 Oct 2021 07:01 )  .035 ng/mL / x     / 51 U/L / x     / 1.2 ng/mL          Assessment/Plan    Will continue to follow during hospital course and give further recommendations as needed . Thanks for your referral . 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 Pt states he would intermittently feel chills and feverish, recorded no temps at group home since last couple of days, he states progressively worsening of dyspnea on exertion and rest, patient unable to walk short distances or climb stairs due to dyspnea, however denies orthopnea, cough, sputum production, night sweats. Patient mentioned some dizziness associated with lower extremities weakness, usually ambulates independently but now feels unable to hold his weight. Regarding palpitations, pt states for 3 days his heart would skip a beat, return to normal. Self limited episodes under a minute, no associated chest pain. Pt also describes urinary frequency beyond baseline, denies dysuria or incontinence, hematuria, constipation.     ED course:   VS: Afebrile, HR: 80 BP: 220/100->235/116-> 189/85, Spo2: 98 on NC RR: 20  Labs significant for low stable hemoglobin, BUN/creatinine: 36/2.30, Trop 0.149, Pro BMP 8061.   EKG on admission showed Afib @ 64 bpm, incompleta right BBB, LVH  CT head shows no  evidence of acute intracranial hemorrhage, midline shift or CT evidence of acute territorial infarct.  CT chest A/P demonstrated Multilobar pneumonia. Mild cardiomegaly. No mall bowel obstruction or active bowel inflammation.  Patient was given in the ED 10 mg IVP hydralazine 10 mg Labetalol IVP 1x, 2000 cc bolus NS 1x            (07 Oct 2021 01:24)        SUBJECTIVE:      ALLERGIES:  Allergies    No Known Allergies    Intolerances          MEDICATIONS  (STANDING):  amLODIPine   Tablet 10 milliGRAM(s) Oral daily  amoxicillin  500 milliGRAM(s)/clavulanate 1 Tablet(s) Oral two times a day  apixaban 5 milliGRAM(s) Oral every 12 hours  ARIPiprazole 30 milliGRAM(s) Oral daily  aspirin enteric coated 81 milliGRAM(s) Oral daily  budesonide 160 MICROgram(s)/formoterol 4.5 MICROgram(s) Inhaler 2 Puff(s) Inhalation two times a day  cloNIDine 0.1 milliGRAM(s) Oral three times a day  cyanocobalamin 1000 MICROGram(s) Oral daily  dextrose 40% Gel 15 Gram(s) Oral once  dextrose 5%. 1000 milliLiter(s) (50 mL/Hr) IV Continuous <Continuous>  dextrose 50% Injectable 25 Gram(s) IV Push once  doxazosin 8 milliGRAM(s) Oral at bedtime  famotidine    Tablet 20 milliGRAM(s) Oral every 48 hours  folic acid 1 milliGRAM(s) Oral daily  furosemide    Tablet 20 milliGRAM(s) Oral daily  glucagon  Injectable 1 milliGRAM(s) IntraMuscular once  hydrALAZINE 100 milliGRAM(s) Oral every 8 hours  insulin lispro (ADMELOG) corrective regimen sliding scale   SubCutaneous three times a day before meals  lamoTRIgine 100 milliGRAM(s) Oral daily  mirtazapine 30 milliGRAM(s) Oral at bedtime  nystatin Powder 1 Application(s) Topical two times a day  oxybutynin 5 milliGRAM(s) Oral four times a day  pantoprazole    Tablet 40 milliGRAM(s) Oral before breakfast  venlafaxine XR. 150 milliGRAM(s) Oral daily    MEDICATIONS  (PRN):  acetaminophen   Tablet .. 650 milliGRAM(s) Oral every 6 hours PRN Temp greater or equal to 38C (100.4F), Mild Pain (1 - 3)  melatonin 3 milliGRAM(s) Oral at bedtime PRN Insomnia  ondansetron Injectable 4 milliGRAM(s) IV Push every 8 hours PRN Nausea and/or Vomiting  sodium chloride 0.65% Nasal 1 Spray(s) Both Nostrils two times a day PRN Nasal Congestion      REVIEW OF SYSTEMS:  CONSTITUTIONAL: No fever,  RESPIRATORY: Improvement in  cough, wheezing, shortness of breath  CARDIOVASCULAR: Improvement in  chest pain and  dyspnea, No  palpitations, dizziness, syncope, paroxysmal nocturnal dyspnea, and improvement in SOB and  leg swellings .  GASTROINTESTINAL: No abdominal  or epigastric pain, nausea, vomiting,  diarrhea  NEUROLOGICAL: No headaches,  loss of strength, numbness, or tremors  Skin : No itching .  Hematology : No bleeding .  Endocrinology : No heat and cold intolerance .  Psychiatry : Patient is mild anxious .  Musculoskeletal : Patient has mild arthritis .    Vital Signs Last 24 Hrs  T(C): 36.6 (13 Oct 2021 18:51), Max: 36.7 (13 Oct 2021 09:11)  T(F): 97.9 (13 Oct 2021 18:51), Max: 98 (13 Oct 2021 09:11)  HR: 74 (13 Oct 2021 18:51) (67 - 77)  BP: 166/81 (13 Oct 2021 18:51) (157/69 - 197/76)  BP(mean): --  RR: 18 (13 Oct 2021 18:51) (15 - 18)  SpO2: 96% (13 Oct 2021 18:51) (91% - 96%)    PHYSICAL EXAM:  HEAD:  Atraumatic, Normocephalic  NECK: Supple and normal thyroid.  No JVD or carotid bruit.   HEART: S1, S2 irregular , 1/6 soft ejection systolic murmur in mitral area , no thrill and no gallops .  PULMONARY: Bilateral vesicular breathing , few scattered rhonchi and few scattered rales are present .  ABDOMEN: Soft nontender and positive bowl sounds   EXTREMITIES:  No clubbing, cyanosis, and minimal  pedal  edema present .  NEUROLOGICAL: AAOX3 , no focal deficit .  Skin : No rashes .  Musculoskeletal : No joint swellings .    LABS:                        8.3    7.53  )-----------( 269      ( 13 Oct 2021 06:58 )             28.0     10-13    139  |  108  |  35<H>  ----------------------------<  150<H>  3.7   |  23  |  2.90<H>    Ca    8.7      13 Oct 2021 07:01    TPro  6.9  /  Alb  2.9<L>  /  TBili  0.3  /  DBili  x   /  AST  14<L>  /  ALT  20  /  AlkPhos  29<L>  10-13    CARDIAC MARKERS ( 13 Oct 2021 19:03 )  .064 ng/mL / x     / 51 U/L / x     / 1.6 ng/mL  CARDIAC MARKERS ( 13 Oct 2021 13:51 )  .063 ng/mL / x     / 51 U/L / x     / 1.2 ng/mL  CARDIAC MARKERS ( 13 Oct 2021 10:41 )  .053 ng/mL / x     / 46 U/L / x     / 1.2 ng/mL  CARDIAC MARKERS ( 13 Oct 2021 07:01 )  .035 ng/mL / x     / 51 U/L / x     / 1.2 ng/mL          Assessment/Plan  Patient has :  1) Brief episode of chest pain which is atypical  and mild elevated Troponin I are demand ischemia due to uncontrolled Hypertension and  not NON-ST KTAERINA  as primary event .  2) Mild elevated Troponin I secondary to demand ischemia and not ACS or NON-ST KATERINA as primary event .  3) Paroxysmal atrial  fibrillation and has symptomatic pause of 3.2 seconds and less than 4.0 second pause in the setting of atrial fibrillation and asymptomatic pause is benign .  4) Chronic mild diastolic heart failure and stable   5) Chronic anemia .  6) Chronic renal insufficiency .  7) Hypertension and BP intermittently mild elevated   Plan : 1) Monitor hemoglobin and electrolytes 2) Decrease clonidine due to bradycardia , Patient already off beta blockers and TSH levels are normal and increase doxazosin for better control of BP  3) Rest of medications as such . 4) Prognosis guarded  6) Will add Imdur 30 mg and Nifedipine XL 30 mg daily for better control of BP . Nifedipine can cause reflex tachycardia so will help for bradycardia and eventually if possible will discontinue clonidine as out patient . 7) [facility]  Will continue to follow during hospital course and give further recommendations as needed . Thanks for your referral . if any questions please contact me at office ([phone])       Will continue to follow during hospital course and give further recommendations as needed . Thanks for your referral . if any questions please contact me at office ([phone] [phone])

## 2021-10-13 NOTE — PROGRESS NOTE ADULT - PROBLEM SELECTOR PLAN 5
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 Cayden brown following, recs appreciated - check bladder scan, c3 c4 complement and dsdna  -As per Renal increase Cr is likely 2/2 tight control of BP- Will STOP BB, Lower dose of Clonidine 2/2 also Bradycardia  - K this AM 3.4- repleted with 40KCL x 1. 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 Renal dose Meds.  -  Lasix BID was on HOLD , will restart Lasix 20 mg daily as d/w renal   - Avoid nephrotoxic medications  - BMP in AM   - Dr. Cayden brown following, recs appreciated   -As per Renal increase Cr is likely 2/2 tight control of BP- Will STOP BB, Lower dose of Clonidine 2/2 also Bradycardia

## 2021-10-13 NOTE — DISCHARGE NOTE NURSING/CASE MANAGEMENT/SOCIAL WORK - PATIENT PORTAL LINK FT
You can access the FollowMyHealth Patient Portal offered by [facility] by registering at the following website: [email or web address]. By joining [facility]’s FollowMyHealth portal, you will also be able to view your health information using other applications (apps) compatible with our system.

## 2021-10-13 NOTE — PROGRESS NOTE ADULT - ASSESSMENT
[ASSESSMENT and  PLAN]  75yo M admitted with HTN urgency,  /100 and multilobar pneumonia; history of GI bleed with diverticulosis; also noted to have renal insufficiency  Empirically started on oral B12 and folate; but levels replete and stopped  Iron studies c/w iron deficiency; started on IV iron therapy  Remains on  Apixiban 5mg q12h for Afib    - serum immunofixation with weak IgG-kappa, normal IgG/A/M, both kappa and lambda elevated and k/l ratio sl elevated. Findings more c/w MGUS or reactive, can be monitored serially as outpatient  - Hgb sl lower but essentially stable, no signs of active bleeds, continue serial monitor  - symptomatic management from Heme standpoint

## 2021-10-13 NOTE — PROGRESS NOTE ADULT - SUBJECTIVE AND OBJECTIVE BOX
Patient is a 76y old  Male who presents with a chief complaint of multilobar PNA, afib (11 Oct 2021 14:23)  Patient seen in follow up for CKD 4.        PAST MEDICAL HISTORY:  Hypertension    Diabetes mellitus    Lupus    Hepatitis C    Anxiety and depression    CAD (coronary artery disease)    Diverticulosis    Hyperlipidemia    HTN (hypertension)    HLD (hyperlipidemia)    Atrial fibrillation    CAD (coronary artery disease)    Type 2 diabetes mellitus    Anxiety    History of diverticulitis    Diverticulosis      MEDICATIONS  (STANDING):  amLODIPine   Tablet 10 milliGRAM(s) Oral daily  amoxicillin  500 milliGRAM(s)/clavulanate 1 Tablet(s) Oral two times a day  apixaban 5 milliGRAM(s) Oral every 12 hours  ARIPiprazole 30 milliGRAM(s) Oral daily  aspirin enteric coated 81 milliGRAM(s) Oral daily  budesonide 160 MICROgram(s)/formoterol 4.5 MICROgram(s) Inhaler 2 Puff(s) Inhalation two times a day  cyanocobalamin 1000 MICROGram(s) Oral daily  dextrose 40% Gel 15 Gram(s) Oral once  dextrose 5%. 1000 milliLiter(s) (50 mL/Hr) IV Continuous <Continuous>  dextrose 50% Injectable 25 Gram(s) IV Push once  doxazosin 8 milliGRAM(s) Oral at bedtime  famotidine    Tablet 20 milliGRAM(s) Oral every 48 hours  folic acid 1 milliGRAM(s) Oral daily  glucagon  Injectable 1 milliGRAM(s) IntraMuscular once  hydrALAZINE 100 milliGRAM(s) Oral every 8 hours  insulin lispro (ADMELOG) corrective regimen sliding scale   SubCutaneous three times a day before meals  lamoTRIgine 100 milliGRAM(s) Oral daily  mirtazapine 30 milliGRAM(s) Oral at bedtime  nystatin Powder 1 Application(s) Topical two times a day  oxybutynin 5 milliGRAM(s) Oral four times a day  pantoprazole    Tablet 40 milliGRAM(s) Oral before breakfast  venlafaxine XR. 150 milliGRAM(s) Oral daily    MEDICATIONS  (PRN):  acetaminophen   Tablet .. 650 milliGRAM(s) Oral every 6 hours PRN Temp greater or equal to 38C (100.4F), Mild Pain (1 - 3)  melatonin 3 milliGRAM(s) Oral at bedtime PRN Insomnia  ondansetron Injectable 4 milliGRAM(s) IV Push every 8 hours PRN Nausea and/or Vomiting  sodium chloride 0.65% Nasal 1 Spray(s) Both Nostrils two times a day PRN Nasal Congestion    T(C): 36.7 (10-13-21 @ 09:11), Max: 36.7 (10-11-21 @ 11:59)  HR: 77 (10-13-21 @ 09:11) (59 - 77)  BP: 157/69 (10-13-21 @ 09:11) (143/67 - 197/76)  RR: 15 (10-13-21 @ 09:11)  SpO2: 92% (10-13-21 @ 09:11)  Wt(kg): --  I&O's Detail    12 Oct 2021 07:01  -  13 Oct 2021 07:00  --------------------------------------------------------  IN:    Oral Fluid: 360 mL  Total IN: 360 mL    OUT:  Total OUT: 0 mL    Total NET: 360 mL            PHYSICAL EXAM:  General: No distress  Respiratory: b/l air entry  Cardiovascular: S1 S2  Gastrointestinal: soft  Extremities:  + edema                           LABORATORY:                        8.3    7.53  )-----------( 269      ( 13 Oct 2021 06:58 )             28.0     10-13    139  |  108  |  35<H>  ----------------------------<  150<H>  3.7   |  23  |  2.90<H>    Ca    8.7      13 Oct 2021 07:01    TPro  6.9  /  Alb  2.9<L>  /  TBili  0.3  /  DBili  x   /  AST  14<L>  /  ALT  20  /  AlkPhos  29<L>  10-13    Sodium, Serum: 139 mmol/L (10-13 @ 07:01)  Sodium, Serum: 140 mmol/L (10-12 @ 07:27)    Potassium, Serum: 3.7 mmol/L (10-13 @ 07:01)  Potassium, Serum: 3.4 mmol/L (10-12 @ 07:27)    Hemoglobin: 8.3 g/dL (10-13 @ 06:58)  Hemoglobin: 8.0 g/dL (10-12 @ 07:27)  Hemoglobin: 8.7 g/dL (10-11 @ 06:48)    Creatinine, Serum 2.90 (10-13 @ 07:01)  Creatinine, Serum 2.80 (10-12 @ 07:27)  Creatinine, Serum 2.90 (10-11 @ 06:48)    CARDIAC MARKERS ( 13 Oct 2021 07:01 )  .035 ng/mL / x     / 51 U/L / x     / 1.2 ng/mL      LIVER FUNCTIONS - ( 13 Oct 2021 07:01 )  Alb: 2.9 g/dL / Pro: 6.9 g/dL / ALK PHOS: 29 U/L / ALT: 20 U/L / AST: 14 U/L / GGT: x

## 2021-10-13 NOTE — PROGRESS NOTE ADULT - PROBLEM SELECTOR PLAN 1
Multilobar PNA -improving   - On admission does not meet sepsis criteria; afebrile, WBC WNL, normal lactate   - RIVP negative on admission   - CT Chest showed branching nodular opacities and patchy airspace opacities noted in the lingula and bilateral lower lobes which are likely infectious in etiology. No pleural effusions are present.   - s/p 1g Rocephin and Azithromycin 500 mg daily x 2 days  - Will switch Zosyn to Augmentin 500mg BID x days per ID Dr. Garner  - BCx NGTD,   - procalcitonin --> elevated 0.14, repeat 0.24  CBC in AM. Multilobar PNA -improving   - On admission does not meet sepsis criteria; afebrile, WBC WNL, normal lactate   - RIVP negative on admission   - CT Chest showed branching nodular opacities and patchy airspace opacities noted in the lingula and bilateral lower lobes which are likely infectious in etiology. No pleural effusions are present.   - s/p 1g Rocephin and Azithromycin 500 mg daily x 2 days  - Will switch Zosyn to Augmentin 500mg BID x  2 days per ID Dr. Garner, last dose today   - BCx NGTD,   - procalcitonin --> elevated 0.14, repeat 0.24  CBC in AM.

## 2021-10-13 NOTE — PROGRESS NOTE ADULT - PROBLEM SELECTOR PLAN 8
Abnormal CT finding   -CT chest on admission showed Multiple heterogeneous thyroid nodules noted bilaterally  - TSH- normal  - Recommended to follow up outpatient. Abnormal CT finding   -CT chest on admission showed Multiple heterogeneous thyroid nodules noted bilaterally  - TSH- normal  - Recommended to follow up outpatient.  -Endo-Dr C perlman eval done.  ·  Recommendation: biochemically euthyroid  check thyroid US. Abnormal CT finding   -CT chest on admission showed Multiple heterogeneous thyroid nodules noted bilaterally  - TSH- normal- Recommended to follow up outpatient.  -Endo-Dr C perlman eval done.  · -Recommendation: biochemically euthyroid-thyroid US. as above

## 2021-10-13 NOTE — PROGRESS NOTE ADULT - SUBJECTIVE AND OBJECTIVE BOX
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 Pt states he would intermittently feel chills and feverish, recorded no temps at group home since last couple of days, he states progressively worsening of dyspnea on exertion and rest, patient unable to walk short distances or climb stairs due to dyspnea, however denies orthopnea, cough, sputum production, night sweats. Patient mentioned some dizziness associated with lower extremities weakness, usually ambulates independently but now feels unable to hold his weight. Regarding palpitations, pt states for 3 days his heart would skip a beat, return to normal. Self limited episodes under a minute, no associated chest pain. Pt also describes urinary frequency beyond baseline, denies dysuria or incontinence, hematuria, constipation.     ED course:   VS: Afebrile, HR: 80 BP: 220/100->235/116-> 189/85, Spo2: 98 on NC RR: 20  Labs significant for low stable hemoglobin, BUN/creatinine: 36/2.30, Trop 0.149, Pro BMP 8061.   EKG on admission showed Afib @ 64 bpm, incompleta right BBB, LVH  CT head shows no  evidence of acute intracranial hemorrhage, midline shift or CT evidence of acute territorial infarct.  CT chest A/P demonstrated Multilobar pneumonia. Mild cardiomegaly. No mall bowel obstruction or active bowel inflammation.  Patient was given in the ED 10 mg IVP hydralazine 10 mg Labetalol IVP 1x, 2000 cc bolus NS 1x            (07 Oct 2021 01:24)    INTERVAL HPI:   10/07/21: Pt seen and examined at bedside; in no acute distress; complains of intermittent palpitation and transient chest pain. On Zosyn 3.375gm q8h + azithromycin 500mg daily; concern for medication compliance; ??capacity. will consult psych. K replaced  10/8/21: Pt seen and examined at bedside. Pt is lethargic and is sleeping although answers with yes and no questions. He has no acute complaints. Denies fevers, chills, chest pain, SOB, abdominal pain, n/v/d/c. On Zosyn 3.375gm q8h + azithromycin 500mg daily. Blood pressure improving. PT recommended LETI yesterday.  10/9/21: Pt seen and examined at bedside. Pt with no complaints this am. Worsening Cr function this am; Nephro on board. Will continue to monitor.   10/10/21: Pt seen and examined at bedside with no complaints this am; On renally dosed zosyn; Add Zithromax, improving renal function this am; pending psych consultation with Dr Winkler.  10/11/21: Patient was seen and examined at bedside. Patient did not want to provide history and wanted to continue sleeping. Patient states he is very sleepy and wants rest. On IV Zosyn /Po Zithromax   10/12/21: Patient was seen and examined at bedside. Switched from IV Zosyn to Augmentin BID x2 days , Replaced K , PT---> HCPT  10/13/21: Patient was seen and examined at bedside. Patient was very drowsy and not able to hold meaningful conversation. Stated he does not have chest pain anymore and feels comfortable.       OVERNIGHT EVENTS:   Patient reported of non-reproducible chest pain localized to the right side of chest. Patient described it as squeezing and radiating to the left arm. Stated that the pain felt different from the prior chest pain incidents. EKG STAT was ordered and showed no changes from prior. First set of cardiac enzymes negative. 2nd follow up set ordered.     Home Medications:  cloNIDine 0.1 mg oral tablet: 2 tab(s) orally 3 times a day (07 Oct 2021 03:41)  digoxin 125 mcg (0.125 mg) oral tablet: 1 tab(s) orally once a day (07 Oct 2021 03:41)  Eliquis 5 mg oral tablet: 1 tab(s) orally 2 times a day (07 Oct 2021 03:41)  famotidine 40 mg oral tablet: 1 tab(s) orally once a day (at bedtime) (07 Oct 2021 03:41)  fenofibrate 145 mg oral tablet: 1 tab(s) orally once a day (07 Oct 2021 03:41)  labetalol 100 mg oral tablet: 1 tab(s) orally 2 times a day (07 Oct 2021 03:41)  Lasix 20 mg oral tablet: 1 tab(s) orally 2 times a day (07 Oct 2021 03:41)  oxybutynin 5 mg oral tablet: 1 tab(s) orally 4 times a day (07 Oct 2021 03:41)  pantoprazole 40 mg oral delayed release tablet: 1 tab(s) orally once a day (before a meal) (07 Oct 2021 03:41)  Symbicort 160 mcg-4.5 mcg/inh inhalation aerosol: 2 puff(s) inhaled 2 times a day (07 Oct 2021 03:41)      MEDICATIONS  (STANDING):  amLODIPine   Tablet 10 milliGRAM(s) Oral daily  amoxicillin  500 milliGRAM(s)/clavulanate 1 Tablet(s) Oral two times a day  apixaban 5 milliGRAM(s) Oral every 12 hours  ARIPiprazole 30 milliGRAM(s) Oral daily  aspirin enteric coated 81 milliGRAM(s) Oral daily  budesonide 160 MICROgram(s)/formoterol 4.5 MICROgram(s) Inhaler 2 Puff(s) Inhalation two times a day  cyanocobalamin 1000 MICROGram(s) Oral daily  dextrose 40% Gel 15 Gram(s) Oral once  dextrose 5%. 1000 milliLiter(s) (50 mL/Hr) IV Continuous <Continuous>  dextrose 50% Injectable 25 Gram(s) IV Push once  doxazosin 8 milliGRAM(s) Oral at bedtime  famotidine    Tablet 20 milliGRAM(s) Oral every 48 hours  folic acid 1 milliGRAM(s) Oral daily  glucagon  Injectable 1 milliGRAM(s) IntraMuscular once  hydrALAZINE 100 milliGRAM(s) Oral every 8 hours  insulin lispro (ADMELOG) corrective regimen sliding scale   SubCutaneous three times a day before meals  lamoTRIgine 100 milliGRAM(s) Oral daily  mirtazapine 30 milliGRAM(s) Oral at bedtime  nystatin Powder 1 Application(s) Topical two times a day  oxybutynin 5 milliGRAM(s) Oral four times a day  pantoprazole    Tablet 40 milliGRAM(s) Oral before breakfast  venlafaxine XR. 150 milliGRAM(s) Oral daily    MEDICATIONS  (PRN):  acetaminophen   Tablet .. 650 milliGRAM(s) Oral every 6 hours PRN Temp greater or equal to 38C (100.4F), Mild Pain (1 - 3)  melatonin 3 milliGRAM(s) Oral at bedtime PRN Insomnia  ondansetron Injectable 4 milliGRAM(s) IV Push every 8 hours PRN Nausea and/or Vomiting  sodium chloride 0.65% Nasal 1 Spray(s) Both Nostrils two times a day PRN Nasal Congestion      Allergies    No Known Allergies    Intolerances        Social History:  Tobacco: quit in 1980, not active smoker  EtOH: denies   Recreational drug use: cocaine several years ago "a few times" has not used in "many years"  Lives with: [facility] house; group home; no nursing assistance; observation is there  Ambulates: independent, denies walker or cane but uses guard railings  ADLs: independent, but states need for assistance in bathing, cooking; independent with feeding, ambulating  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[place]  Vaccinations: Moderna x2 doses last dose in May (07 Oct 2021 01:24)      REVIEW OF SYSTEMS:  CONSTITUTIONAL: No fever, No chills, No fatigue, No myalgia, No Body ache, No Weakness  EYES: No eye pain,  No visual disturbances, No discharge, NO Redness  ENMT:  No ear pain, No nose bleed, No vertigo; No sinus pain, NO throat pain, No Congestion  NECK: No pain, No stiffness  RESPIRATORY: No cough, NO wheezing, No  hemoptysis, NO  shortness of breath  CARDIOVASCULAR: No chest pain, palpitations  GASTROINTESTINAL: No abdominal pain, NO epigastric pain. No nausea, No vomiting; No diarrhea, No constipation. [  ] BM  GENITOURINARY: No dysuria, No frequency, No urgency, No hematuria, NO incontinence  NEUROLOGICAL: No headaches, No dizziness, No numbness, No tingling, No tremors, No weakness  EXT: No Swelling, No Pain, No Edema  SKIN:  [ x ] No itching, burning, rashes, or lesions   MUSCULOSKELETAL: No joint pain ,No Jt swelling; No muscle pain, No back pain, No extremity pain  PSYCHIATRIC: No depression,  No anxiety,  No mood swings ,No difficulty sleeping at night  PAIN SCALE: [ x ] None  [  ] Other-  ROS Unable to obtain due to - [  ] Dementia  [  ] Lethargy [x  ] Drowsy [  ] Sedated [  ] non verbal  REST OF REVIEW Of SYSTEM - [ x ] Normal     Vital Signs Last 24 Hrs  T(C): 36.6 (13 Oct 2021 04:43), Max: 36.7 (12 Oct 2021 11:55)  T(F): 97.9 (13 Oct 2021 04:43), Max: 98.1 (12 Oct 2021 11:55)  HR: 76 (13 Oct 2021 06:03) (69 - 76)  BP: 176/72 (13 Oct 2021 06:03) (156/74 - 197/76)  BP(mean): --  RR: 18 (13 Oct 2021 04:43) (18 - 18)  SpO2: 91% (13 Oct 2021 04:43) (91% - 93%)  Finger Stick        10-12 @ 07:01  -  10-13 @ 07:00  --------------------------------------------------------  IN: 360 mL / OUT: 0 mL / NET: 360 mL        PHYSICAL EXAM:  GENERAL:  [x  ] NAD , [  ] well appearing, [  ] Agitated, [ x ] Drowsy,  [  ] Lethargy, [  ] confused   HEAD:  [x ] Normal, [  ] Other  EYES:  [ x ] EOMI, [  ] PERRLA, [ x ] conjunctiva and sclera clear normal, [  ] Other,  [  ] Pallor,[  ] Discharge  ENMT:  [x  ] Normal, [ x ] Moist mucous membranes, [  ] Good dentition, [  ] No Thrush  NECK:  [ x ] Supple, [x  ] No JVD, [  ] Normal thyroid, [  ] Lymphadenopathy [  ] Other  CHEST/LUNG:  [x  ] Clear to auscultation bilaterally, [x  ] Breath Sounds equal B/L , [  ] poor effort  [ x ] No rales, [ x] No rhonchi  [ x ]  No wheezing,   HEART:  [ x ] Regular rate and rhythm, [  ] tachycardia, [  ] Bradycardia,  [  ] irregular  [ x ] No murmurs, No rubs, No gallops, [x  ] PPM in place   ABDOMEN:  x[  ] Soft, [x  ] Nontender, [x  ] Nondistended, [  ] No mass, [  ] Bowel sounds present, [  ] obese  NERVOUS SYSTEM:  [  ] Alert & Oriented X3, [  ] Nonfocal  [  ] Confusion  [  ] Encephalopathic [  ] Sedated [x  ] Unable to assess, [  ] Dementia [  ] Other-  EXTREMITIES: [x  ] 2+ Peripheral Pulses, No clubbing, No cyanosis,  [  ] edema B/L lower EXT. [  ] PVD stasis skin changes B/L Lower EXT, [  ] wound  LYMPH: No lymphadenopathy noted  SKIN:  [x  ] No rashes or lesions, [ x ] Pressure Ulcers, [  ] ecchymosis, [  ] Skin Tears, [  ] Other    DIET: Diet, Consistent Carbohydrate Renal w/Evening Snack:   DASH/TLC Sodium & Cholesterol Restricted (10-07-21 @ 02:46)      LABS:                        8.3    7.53  )-----------( 269      ( 13 Oct 2021 06:58 )             28.0     13 Oct 2021 07:01    139    |  108    |  35     ----------------------------<  150    3.7     |  23     |  2.90     Ca    8.7        13 Oct 2021 07:01    TPro  6.9    /  Alb  2.9    /  TBili  0.3    /  DBili  x      /  AST  14     /  ALT  20     /  AlkPhos  29     13 Oct 2021 07:01          Culture Results:   No Growth Final (10-07 @ 03:57)  Culture Results:   No Growth Final (10-07 @ 03:57)  Culture Results:   <10,000 CFU/mL Normal Urogenital Johnna (10-07 @ 02:42)          CARDIAC MARKERS ( 13 Oct 2021 07:01 )  .035 ng/mL / x     / 51 U/L / x     / 1.2 ng/mL  CARDIAC MARKERS ( 07 Oct 2021 14:20 )  .112 ng/mL / x     / x     / x     / x      CARDIAC MARKERS ( 07 Oct 2021 04:23 )  .145 ng/mL / x     / 106 U/L / x     / 2.2 ng/mL  CARDIAC MARKERS ( 06 Oct 2021 21:51 )  .149 ng/mL / x     / x     / x     / x              Culture - Blood (collected 07 Oct 2021 03:57)  Source: .Blood Blood-Peripheral  Final Report (12 Oct 2021 04:00):    No Growth Final    Culture - Blood (collected 07 Oct 2021 03:57)  Source: .Blood Blood-Peripheral  Final Report (12 Oct 2021 04:00):    No Growth Final    Culture - Urine (collected 07 Oct 2021 02:42)  Source: Clean Catch Clean Catch (Midstream)  Final Report (08 Oct 2021 12:49):    <10,000 CFU/mL Normal Urogenital Johnna         Anemia Panel:  Iron Total, Serum: 28 ug/dL (10-08-21 @ 22:27)  Iron - Total Binding Capacity.: 337 ug/dL (10-08-21 @ 22:27)  Vitamin B12, Serum: 413 pg/mL (10-08-21 @ 22:26)  Folate, Serum: 9.9 ng/mL (10-08-21 @ 22:26)  Ferritin, Serum: 28 ng/mL (10-08-21 @ 22:26)  Absolute Reticulocytes: 49.4 K/uL (10-08-21 @ 16:57)      Thyroid Panel:  Thyroid Stimulating Hormone, Serum: 0.89 uIU/mL (10-12-21 @ 07:27)  Thyroid Stimulating Hormone, Serum: 1.35 uIU/mL (10-07-21 @ 04:23)            Immunofixation, Serum:   Weak IgG Kappa Band Identified    Reference Range: None Detected (10-08-21 @ 23:37)  Serum Protein Electrophoresis Interp: Weak Gamma-Migrating Paraprotein Identified (10-08-21 @ 23:37)  Serum Pro-Brain Natriuretic Peptide: 7438 pg/mL (10-07-21 @ 04:23)  Serum Pro-Brain Natriuretic Peptide: 8061 pg/mL (10-06-21 @ 21:49)      RADIOLOGY & ADDITIONAL TESTS:      HEALTH ISSUES - PROBLEM Dx:  PNA (pneumonia)    Elevated troponin    Chronic atrial fibrillation    Stage 3 chronic kidney disease    Chronic diastolic congestive heart failure    CAD (coronary artery disease)    DVT prophylaxis    Anemia of chronic disease    DM (diabetes mellitus), type 2    Depression, major    Abnormal CT scan of lung    Hypertensive urgency    Multiple thyroid nodules            Consultant(s) Notes Reviewed:  [  ] YES     Care Discussed with [X] Consultants  [  ] Patient  [  ] Family [  ] HCP [  ]   [  ] Social Service  [  ] RN, [  ] Physical Therapy,[  ] Palliative care team  DVT PPX: [  ] Lovenox, [  ] S C Heparin, [  ] Coumadin, [  ] Xarelto, [ x ] Eliquis, [  ] Pradaxa, [  ] IV Heparin drip, [  ] SCD [  ] Contraindication 2 to GI Bleed,[  ] Ambulation [  ] Contraindicated 2 to  bleed [  ] Contraindicated 2 to Brain Bleed  Advanced directive: [x  ] None, [  ] DNR/DNI Patient is a 76y old  Male who presents with a chief complaint of multilobar PNA, afib (12 Oct 2021 15:29)    HPI:  75 yo M with PMHx of Type 2 DM (not on insulin), BPH, CAD s/p stents >4 years ago (not on plavix), diverticulitis (hospitalized 07/2020 at \[facility]"), GIB 2/2 diverticulosis (2020), anxiety, Lupus, HTN, HLD, CKD stage 3, HepC, 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 Pt states he would intermittently feel chills and feverish, recorded no temps at group home since last couple of days, he states progressively worsening of dyspnea on exertion and rest, patient unable to walk short distances or climb stairs due to dyspnea, however denies orthopnea, cough, sputum production, night sweats. Patient mentioned some dizziness associated with lower extremities weakness, usually ambulates independently but now feels unable to hold his weight. Regarding palpitations, pt states for 3 days his heart would skip a beat, return to normal. Self limited episodes under a minute, no associated chest pain. Pt also describes urinary frequency beyond baseline, denies dysuria or incontinence, hematuria, constipation.     ED course:   VS: Afebrile, HR: 80 BP: 220/100->235/116-> 189/85, Spo2: 98 on NC RR: 20  Labs significant for low stable hemoglobin, BUN/creatinine: 36/2.30, Trop 0.149, Pro BMP 8061.   EKG on admission showed Afib @ 64 bpm, incompleta right BBB, LVH  CT head shows no  evidence of acute intracranial hemorrhage, midline shift or CT evidence of acute territorial infarct.  CT chest A/P demonstrated Multilobar pneumonia. Mild cardiomegaly. No mall bowel obstruction or active bowel inflammation.  Patient was given in the ED 10 mg IVP hydralazine 10 mg Labetalol IVP 1x, 2000 cc bolus NS 1x            (07 Oct 2021 01:24)    INTERVAL HPI:   10/07/21: Pt seen and examined at bedside; in no acute distress; complains of intermittent palpitation and transient chest pain. On Zosyn 3.375gm q8h + azithromycin 500mg daily; concern for medication compliance; ??capacity. will consult psych. K replaced  10/8/21: Pt seen and examined at bedside. Pt is lethargic and is sleeping although answers with yes and no questions. He has no acute complaints. Denies fevers, chills, chest pain, SOB, abdominal pain, n/v/d/c. On Zosyn 3.375gm q8h + azithromycin 500mg daily. Blood pressure improving. PT recommended LETI yesterday.  10/9/21: Pt seen and examined at bedside. Pt with no complaints this am. Worsening Cr function this am; Nephro on board. Will continue to monitor.   10/10/21: Pt seen and examined at bedside with no complaints this am; On renally dosed zosyn; Add Zithromax, improving renal function this am; pending psych consultation with Dr Winkler.  10/11/21: Patient was seen and examined at bedside. Patient did not want to provide history and wanted to continue sleeping. Patient states he is very sleepy and wants rest. On IV Zosyn /Po Zithromax   10/12/21: Patient was seen and examined at bedside. Switched from IV Zosyn to Augmentin BID x2 days , Replaced K , PT---> HCPT  10/13/21: Patient was seen and examined at bedside. Patient was very drowsy and not able to hold meaningful conversation. Stated he does not have chest pain anymore and feels comfortable. Repeat echo performed, f/u results. 1st set of cardiac enzymes negative. 2nd set of cardiac enzymes ordered- f/u results.       OVERNIGHT EVENTS:   Patient reported of non-reproducible chest pain localized to the right side of chest. Patient described it as squeezing and radiating to the left arm. Stated that the pain felt different from the prior chest pain incidents. EKG STAT was ordered and showed no changes from prior. First set of cardiac enzymes negative. 2nd follow up set ordered.     Home Medications:  cloNIDine 0.1 mg oral tablet: 2 tab(s) orally 3 times a day (07 Oct 2021 03:41)  digoxin 125 mcg (0.125 mg) oral tablet: 1 tab(s) orally once a day (07 Oct 2021 03:41)  Eliquis 5 mg oral tablet: 1 tab(s) orally 2 times a day (07 Oct 2021 03:41)  famotidine 40 mg oral tablet: 1 tab(s) orally once a day (at bedtime) (07 Oct 2021 03:41)  fenofibrate 145 mg oral tablet: 1 tab(s) orally once a day (07 Oct 2021 03:41)  labetalol 100 mg oral tablet: 1 tab(s) orally 2 times a day (07 Oct 2021 03:41)  Lasix 20 mg oral tablet: 1 tab(s) orally 2 times a day (07 Oct 2021 03:41)  oxybutynin 5 mg oral tablet: 1 tab(s) orally 4 times a day (07 Oct 2021 03:41)  pantoprazole 40 mg oral delayed release tablet: 1 tab(s) orally once a day (before a meal) (07 Oct 2021 03:41)  Symbicort 160 mcg-4.5 mcg/inh inhalation aerosol: 2 puff(s) inhaled 2 times a day (07 Oct 2021 03:41)      MEDICATIONS  (STANDING):  amLODIPine   Tablet 10 milliGRAM(s) Oral daily  amoxicillin  500 milliGRAM(s)/clavulanate 1 Tablet(s) Oral two times a day  apixaban 5 milliGRAM(s) Oral every 12 hours  ARIPiprazole 30 milliGRAM(s) Oral daily  aspirin enteric coated 81 milliGRAM(s) Oral daily  budesonide 160 MICROgram(s)/formoterol 4.5 MICROgram(s) Inhaler 2 Puff(s) Inhalation two times a day  cyanocobalamin 1000 MICROGram(s) Oral daily  dextrose 40% Gel 15 Gram(s) Oral once  dextrose 5%. 1000 milliLiter(s) (50 mL/Hr) IV Continuous <Continuous>  dextrose 50% Injectable 25 Gram(s) IV Push once  doxazosin 8 milliGRAM(s) Oral at bedtime  famotidine    Tablet 20 milliGRAM(s) Oral every 48 hours  folic acid 1 milliGRAM(s) Oral daily  glucagon  Injectable 1 milliGRAM(s) IntraMuscular once  hydrALAZINE 100 milliGRAM(s) Oral every 8 hours  insulin lispro (ADMELOG) corrective regimen sliding scale   SubCutaneous three times a day before meals  lamoTRIgine 100 milliGRAM(s) Oral daily  mirtazapine 30 milliGRAM(s) Oral at bedtime  nystatin Powder 1 Application(s) Topical two times a day  oxybutynin 5 milliGRAM(s) Oral four times a day  pantoprazole    Tablet 40 milliGRAM(s) Oral before breakfast  venlafaxine XR. 150 milliGRAM(s) Oral daily    MEDICATIONS  (PRN):  acetaminophen   Tablet .. 650 milliGRAM(s) Oral every 6 hours PRN Temp greater or equal to 38C (100.4F), Mild Pain (1 - 3)  melatonin 3 milliGRAM(s) Oral at bedtime PRN Insomnia  ondansetron Injectable 4 milliGRAM(s) IV Push every 8 hours PRN Nausea and/or Vomiting  sodium chloride 0.65% Nasal 1 Spray(s) Both Nostrils two times a day PRN Nasal Congestion      Allergies    No Known Allergies    Intolerances        Social History:  Tobacco: quit in 1980, not active smoker  EtOH: denies   Recreational drug use: cocaine several years ago "a few times" has not used in "many years"  Lives with: [facility]; group home; no nursing assistance; observation is there  Ambulates: independent, denies walker or cane but uses guard railings  ADLs: independent, but states need for assistance in bathing, cooking; independent with feeding, ambulating  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 No nausea, No vomiting; No diarrhea, No constipation. [  ] BM  GENITOURINARY: No dysuria, No frequency, No urgency, No hematuria, NO incontinence  NEUROLOGICAL: No headaches, No dizziness, No numbness, No tingling, No tremors, No weakness  EXT: No Swelling, No Pain, No Edema  SKIN:  [ x ] No itching, burning, rashes, or lesions   MUSCULOSKELETAL: No joint pain ,No Jt swelling; No muscle pain, No back pain, No extremity pain  PSYCHIATRIC: No depression,  No anxiety,  No mood swings ,No difficulty sleeping at night  PAIN SCALE: [ x ] None  [  ] Other-  ROS Unable to obtain due to - [  ] Dementia  [  ] Lethargy [x  ] Drowsy [  ] Sedated [  ] non verbal  REST OF REVIEW Of SYSTEM - [ x ] Normal     Vital Signs Last 24 Hrs  T(C): 36.6 (13 Oct 2021 04:43), Max: 36.7 (12 Oct 2021 11:55)  T(F): 97.9 (13 Oct 2021 04:43), Max: 98.1 (12 Oct 2021 11:55)  HR: 76 (13 Oct 2021 06:03) (69 - 76)  BP: 176/72 (13 Oct 2021 06:03) (156/74 - 197/76)  BP(mean): --  RR: 18 (13 Oct 2021 04:43) (18 - 18)  SpO2: 91% (13 Oct 2021 04:43) (91% - 93%)  Finger Stick        10-12 @ 07:01  -  10-13 @ 07:00  --------------------------------------------------------  IN: 360 mL / OUT: 0 mL / NET: 360 mL        PHYSICAL EXAM:  GENERAL:  [x  ] NAD , [  ] well appearing, [  ] Agitated, [ x ] Drowsy,  [  ] Lethargy, [  ] confused   HEAD:  [x ] Normal, [  ] Other  EYES:  [ x ] EOMI, [  ] PERRLA, [ x ] conjunctiva and sclera clear normal, [  ] Other,  [  ] Pallor,[  ] Discharge  ENMT:  [x  ] Normal, [ x ] Moist mucous membranes, [  ] Good dentition, [  ] No Thrush  NECK:  [ x ] Supple, [x  ] No JVD, [  ] Normal thyroid, [  ] Lymphadenopathy [  ] Other  CHEST/LUNG:  [x  ] Clear to auscultation bilaterally, [x  ] Breath Sounds equal B/L , [  ] poor effort  [ x ] No rales, [ x] No rhonchi  [ x ]  No wheezing,   HEART:  [ x ] Regular rate and rhythm, [  ] tachycardia, [  ] Bradycardia,  [  ] irregular  [ x ] No murmurs, No rubs, No gallops, [x  ] PPM in place   ABDOMEN:  x[  ] Soft, [x  ] Nontender, [x  ] Nondistended, [  ] No mass, [  ] Bowel sounds present, [  ] obese  NERVOUS SYSTEM:  [  ] Alert & Oriented X3, [  ] Nonfocal  [  ] Confusion  [  ] Encephalopathic [  ] Sedated [x  ] Unable to assess, [  ] Dementia [  ] Other-  EXTREMITIES: [x  ] 2+ Peripheral Pulses, No clubbing, No cyanosis,  [  ] edema B/L lower EXT. [  ] PVD stasis skin changes B/L Lower EXT, [  ] wound  LYMPH: No lymphadenopathy noted  SKIN:  [x  ] No rashes or lesions, [ x ] Pressure Ulcers, [  ] ecchymosis, [  ] Skin Tears, [  ] Other    DIET: Diet, Consistent Carbohydrate Renal w/Evening Snack:   DASH/TLC Sodium & Cholesterol Restricted (10-07-21 @ 02:46)      LABS:                        8.3    7.53  )-----------( 269      ( 13 Oct 2021 06:58 )             28.0     13 Oct 2021 07:01    139    |  108    |  35     ----------------------------<  150    3.7     |  23     |  2.90     Ca    8.7        13 Oct 2021 07:01    TPro  6.9    /  Alb  2.9    /  TBili  0.3    /  DBili  x      /  AST  14     /  ALT  20     /  AlkPhos  29     13 Oct 2021 07:01          Culture Results:   No Growth Final (10-07 @ 03:57)  Culture Results:   No Growth Final (10-07 @ 03:57)  Culture Results:   <10,000 CFU/mL Normal Urogenital Johnna (10-07 @ 02:42)          CARDIAC MARKERS ( 13 Oct 2021 07:01 )  .035 ng/mL / x     / 51 U/L / x     / 1.2 ng/mL  CARDIAC MARKERS ( 07 Oct 2021 14:20 )  .112 ng/mL / x     / x     / x     / x      CARDIAC MARKERS ( 07 Oct 2021 04:23 )  .145 ng/mL / x     / 106 U/L / x     / 2.2 ng/mL  CARDIAC MARKERS ( 06 Oct 2021 21:51 )  .149 ng/mL / x     / x     / x     / x              Culture - Blood (collected 07 Oct 2021 03:57)  Source: .Blood Blood-Peripheral  Final Report (12 Oct 2021 04:00):    No Growth Final    Culture - Blood (collected 07 Oct 2021 03:57)  Source: .Blood Blood-Peripheral  Final Report (12 Oct 2021 04:00):    No Growth Final    Culture - Urine (collected 07 Oct 2021 02:42)  Source: Clean Catch Clean Catch (Midstream)  Final Report (08 Oct 2021 12:49):    <10,000 CFU/mL Normal Urogenital Johnna         Anemia Panel:  Iron Total, Serum: 28 ug/dL (10-08-21 @ 22:27)  Iron - Total Binding Capacity.: 337 ug/dL (10-08-21 @ 22:27)  Vitamin B12, Serum: 413 pg/mL (10-08-21 @ 22:26)  Folate, Serum: 9.9 ng/mL (10-08-21 @ 22:26)  Ferritin, Serum: 28 ng/mL (10-08-21 @ 22:26)  Absolute Reticulocytes: 49.4 K/uL (10-08-21 @ 16:57)      Thyroid Panel:  Thyroid Stimulating Hormone, Serum: 0.89 uIU/mL (10-12-21 @ 07:27)  Thyroid Stimulating Hormone, Serum: 1.35 uIU/mL (10-07-21 @ 04:23)            Immunofixation, Serum:   Weak IgG Kappa Band Identified    Reference Range: None Detected (10-08-21 @ 23:37)  Serum Protein Electrophoresis Interp: Weak Gamma-Migrating Paraprotein Identified (10-08-21 @ 23:37)  Serum Pro-Brain Natriuretic Peptide: 7438 pg/mL (10-07-21 @ 04:23)  Serum Pro-Brain Natriuretic Peptide: 8061 pg/mL (10-06-21 @ 21:49)      RADIOLOGY & ADDITIONAL TESTS:      HEALTH ISSUES - PROBLEM Dx:  PNA (pneumonia)    Elevated troponin    Chronic atrial fibrillation    Stage 3 chronic kidney disease    Chronic diastolic congestive heart failure    CAD (coronary artery disease)    DVT prophylaxis    Anemia of chronic disease    DM (diabetes mellitus), type 2    Depression, major    Abnormal CT scan of lung    Hypertensive urgency    Multiple thyroid nodules            Consultant(s) Notes Reviewed:  [  ] YES     Care Discussed with [X] Consultants  [  ] Patient  [  ] Family [  ] HCP [  ]   [  ] Social Service  [  ] RN, [  ] Physical Therapy,[  ] Palliative care team  DVT PPX: [  ] Lovenox, [  ] S C Heparin, [  ] Coumadin, [  ] Xarelto, [ x ] Eliquis, [  ] Pradaxa, [  ] IV Heparin drip, [  ] SCD [  ] Contraindication 2 to GI Bleed,[  ] Ambulation [  ] Contraindicated 2 to  bleed [  ] Contraindicated 2 to Brain Bleed  Advanced directive: [x  ] None, [  ] DNR/DNI Patient is a 76y old  Male who presents with a chief complaint of multilobar PNA, afib (12 Oct 2021 15:29)    HPI:  77 yo M with PMHx of Type 2 DM (not on insulin), BPH, CAD s/p stents >4 years ago (not on plavix), diverticulitis (hospitalized 07/2020 at [facility]), GIB 2/2 diverticulosis (2020), anxiety, Lupus, HTN, HLD, CKD stage 3, HepC, 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 Pt states he would intermittently feel chills and feverish, recorded no temps at group home since last couple of days, he states progressively worsening of dyspnea on exertion and rest, patient unable to walk short distances or climb stairs due to dyspnea, however denies orthopnea, cough, sputum production, night sweats. Patient mentioned some dizziness associated with lower extremities weakness, usually ambulates independently but now feels unable to hold his weight. Regarding palpitations, pt states for 3 days his heart would skip a beat, return to normal. Self limited episodes under a minute, no associated chest pain. Pt also describes urinary frequency beyond baseline, denies dysuria or incontinence, hematuria, constipation.     ED course:   VS: Afebrile, HR: 80 BP: 220/100->235/116-> 189/85, Spo2: 98 on NC RR: 20  Labs significant for low stable hemoglobin, BUN/creatinine: 36/2.30, Trop 0.149, Pro BMP 8061.   EKG on admission showed Afib @ 64 bpm, incompleta right BBB, LVH  CT head shows no  evidence of acute intracranial hemorrhage, midline shift or CT evidence of acute territorial infarct.  CT chest A/P demonstrated Multilobar pneumonia. Mild cardiomegaly. No mall bowel obstruction or active bowel inflammation.  Patient was given in the ED 10 mg IVP hydralazine 10 mg Labetalol IVP 1x, 2000 cc bolus NS 1x            (07 Oct 2021 01:24)    INTERVAL HPI:   10/07/21: Pt seen and examined at bedside; in no acute distress; complains of intermittent palpitation and transient chest pain. On Zosyn 3.375gm q8h + azithromycin 500mg daily; concern for medication compliance; ??capacity. will consult psych. K replaced  10/8/21: Pt seen and examined at bedside. Pt is lethargic and is sleeping although answers with yes and no questions. He has no acute complaints. Denies fevers, chills, chest pain, SOB, abdominal pain, n/v/d/c. On Zosyn 3.375gm q8h + azithromycin 500mg daily. Blood pressure improving. PT recommended LETI yesterday.  10/9/21: Pt seen and examined at bedside. Pt with no complaints this am. Worsening Cr function this am; Nephro on board. Will continue to monitor.   10/10/21: Pt seen and examined at bedside with no complaints this am; On renally dosed zosyn; Add Zithromax, improving renal function this am; pending psych consultation with Dr Winkler.  10/11/21: Patient was seen and examined at bedside. Patient did not want to provide history and wanted to continue sleeping. Patient states he is very sleepy and wants rest. On IV Zosyn /Po Zithromax   10/12/21: Patient was seen and examined at bedside. Switched from IV Zosyn to Augmentin BID x2 days , Replaced K , PT---> HCPT  10/13/21: Patient was seen and examined at bedside. Patient was very drowsy and not able to hold meaningful conversation. Stated he does not have chest pain anymore and feels comfortable. Repeat echo performed, f/u results. 1st set of cardiac enzymes negative. 2nd set of cardiac enzymes - 0.053, 3rd set ordered- Dr. Gamboa aware       OVERNIGHT EVENTS:   Patient reported of non-reproducible chest pain localized to the right side of chest. Patient described it as squeezing and radiating to the left arm. Stated that the pain felt different from the prior chest pain incidents. EKG STAT was ordered and showed no changes from prior. First set of cardiac enzymes negative. 2nd follow up set ordered.     Home Medications:  cloNIDine 0.1 mg oral tablet: 2 tab(s) orally 3 times a day (07 Oct 2021 03:41)  digoxin 125 mcg (0.125 mg) oral tablet: 1 tab(s) orally once a day (07 Oct 2021 03:41)  Eliquis 5 mg oral tablet: 1 tab(s) orally 2 times a day (07 Oct 2021 03:41)  famotidine 40 mg oral tablet: 1 tab(s) orally once a day (at bedtime) (07 Oct 2021 03:41)  fenofibrate 145 mg oral tablet: 1 tab(s) orally once a day (07 Oct 2021 03:41)  labetalol 100 mg oral tablet: 1 tab(s) orally 2 times a day (07 Oct 2021 03:41)  Lasix 20 mg oral tablet: 1 tab(s) orally 2 times a day (07 Oct 2021 03:41)  oxybutynin 5 mg oral tablet: 1 tab(s) orally 4 times a day (07 Oct 2021 03:41)  pantoprazole 40 mg oral delayed release tablet: 1 tab(s) orally once a day (before a meal) (07 Oct 2021 03:41)  Symbicort 160 mcg-4.5 mcg/inh inhalation aerosol: 2 puff(s) inhaled 2 times a day (07 Oct 2021 03:41)      MEDICATIONS  (STANDING):  amLODIPine   Tablet 10 milliGRAM(s) Oral daily  amoxicillin  500 milliGRAM(s)/clavulanate 1 Tablet(s) Oral two times a day  apixaban 5 milliGRAM(s) Oral every 12 hours  ARIPiprazole 30 milliGRAM(s) Oral daily  aspirin enteric coated 81 milliGRAM(s) Oral daily  budesonide 160 MICROgram(s)/formoterol 4.5 MICROgram(s) Inhaler 2 Puff(s) Inhalation two times a day  cyanocobalamin 1000 MICROGram(s) Oral daily  dextrose 40% Gel 15 Gram(s) Oral once  dextrose 5%. 1000 milliLiter(s) (50 mL/Hr) IV Continuous <Continuous>  dextrose 50% Injectable 25 Gram(s) IV Push once  doxazosin 8 milliGRAM(s) Oral at bedtime  famotidine    Tablet 20 milliGRAM(s) Oral every 48 hours  folic acid 1 milliGRAM(s) Oral daily  glucagon  Injectable 1 milliGRAM(s) IntraMuscular once  hydrALAZINE 100 milliGRAM(s) Oral every 8 hours  insulin lispro (ADMELOG) corrective regimen sliding scale   SubCutaneous three times a day before meals  lamoTRIgine 100 milliGRAM(s) Oral daily  mirtazapine 30 milliGRAM(s) Oral at bedtime  nystatin Powder 1 Application(s) Topical two times a day  oxybutynin 5 milliGRAM(s) Oral four times a day  pantoprazole    Tablet 40 milliGRAM(s) Oral before breakfast  venlafaxine XR. 150 milliGRAM(s) Oral daily    MEDICATIONS  (PRN):  acetaminophen   Tablet .. 650 milliGRAM(s) Oral every 6 hours PRN Temp greater or equal to 38C (100.4F), Mild Pain (1 - 3)  melatonin 3 milliGRAM(s) Oral at bedtime PRN Insomnia  ondansetron Injectable 4 milliGRAM(s) IV Push every 8 hours PRN Nausea and/or Vomiting  sodium chloride 0.65% Nasal 1 Spray(s) Both Nostrils two times a day PRN Nasal Congestion      Allergies    No Known Allergies    Intolerances        Social History:  Tobacco: quit in 1980, not active smoker  EtOH: denies   Recreational drug use: cocaine several years ago "a few times" has not used in "many years"  Lives with: [facility]; group home; no nursing assistance; observation is there  Ambulates: independent, denies walker or cane but uses guard railings  ADLs: independent, but states need for assistance in bathing, cooking; independent with feeding, ambulating  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[place]  Vaccinations: Moderna x2 doses last dose in May (07 Oct 2021 01:24)      REVIEW OF SYSTEMS:  CONSTITUTIONAL: No fever, No chills, No fatigue, No myalgia, No Body ache, No Weakness  EYES: No eye pain,  No visual disturbances, No discharge, NO Redness  ENMT:  No ear pain, No nose bleed, No vertigo; No sinus pain, NO throat pain, No Congestion  NECK: No pain, No stiffness  RESPIRATORY: No cough, NO wheezing, No  hemoptysis, NO  shortness of breath  CARDIOVASCULAR: No chest pain, palpitations  GASTROINTESTINAL: No abdominal pain, NO epigastric pain. No nausea, No vomiting; No diarrhea, No constipation. [  ] BM  GENITOURINARY: No dysuria, No frequency, No urgency, No hematuria, NO incontinence  NEUROLOGICAL: No headaches, No dizziness, No numbness, No tingling, No tremors, No weakness  EXT: No Swelling, No Pain, No Edema  SKIN:  [ x ] No itching, burning, rashes, or lesions   MUSCULOSKELETAL: No joint pain ,No Jt swelling; No muscle pain, No back pain, No extremity pain  PSYCHIATRIC: No depression,  No anxiety,  No mood swings ,No difficulty sleeping at night  PAIN SCALE: [ x ] None  [  ] Other-  ROS Unable to obtain due to - [  ] Dementia  [  ] Lethargy [x  ] Drowsy [  ] Sedated [  ] non verbal  REST OF REVIEW Of SYSTEM - [ x ] Normal     Vital Signs Last 24 Hrs  T(C): 36.6 (13 Oct 2021 04:43), Max: 36.7 (12 Oct 2021 11:55)  T(F): 97.9 (13 Oct 2021 04:43), Max: 98.1 (12 Oct 2021 11:55)  HR: 76 (13 Oct 2021 06:03) (69 - 76)  BP: 176/72 (13 Oct 2021 06:03) (156/74 - 197/76)  BP(mean): --  RR: 18 (13 Oct 2021 04:43) (18 - 18)  SpO2: 91% (13 Oct 2021 04:43) (91% - 93%)  Finger Stick        10-12 @ 07:01  -  10-13 @ 07:00  --------------------------------------------------------  IN: 360 mL / OUT: 0 mL / NET: 360 mL        PHYSICAL EXAM:  GENERAL:  [x  ] NAD , [  ] well appearing, [  ] Agitated, [ x ] Drowsy,  [  ] Lethargy, [  ] confused   HEAD:  [x ] Normal, [  ] Other  EYES:  [ x ] EOMI, [  ] PERRLA, [ x ] conjunctiva and sclera clear normal, [  ] Other,  [  ] Pallor,[  ] Discharge  ENMT:  [x  ] Normal, [ x ] Moist mucous membranes, [  ] Good dentition, [  ] No Thrush  NECK:  [ x ] Supple, [x  ] No JVD, [  ] Normal thyroid, [  ] Lymphadenopathy [  ] Other  CHEST/LUNG:  [x  ] Clear to auscultation bilaterally, [x  ] Breath Sounds equal B/L , [  ] poor effort  [ x ] No rales, [ x] No rhonchi  [ x ]  No wheezing,   HEART:  [ x ] Regular rate and rhythm, [  ] tachycardia, [  ] Bradycardia,  [  ] irregular  [ x ] No murmurs, No rubs, No gallops, [x  ] PPM in place   ABDOMEN:  x[  ] Soft, [x  ] Nontender, [x  ] Nondistended, [  ] No mass, [  ] Bowel sounds present, [  ] obese  NERVOUS SYSTEM:  [  ] Alert & Oriented X3, [  ] Nonfocal  [  ] Confusion  [  ] Encephalopathic [  ] Sedated [x  ] Unable to assess, [  ] Dementia [  ] Other-  EXTREMITIES: [x  ] 2+ Peripheral Pulses, No clubbing, No cyanosis,  [  ] edema B/L lower EXT. [  ] PVD stasis skin changes B/L Lower EXT, [  ] wound  LYMPH: No lymphadenopathy noted  SKIN:  [x  ] No rashes or lesions, [ x ] Pressure Ulcers, [  ] ecchymosis, [  ] Skin Tears, [  ] Other    DIET: Diet, Consistent Carbohydrate Renal w/Evening Snack:   DASH/TLC Sodium & Cholesterol Restricted (10-07-21 @ 02:46)      LABS:                        8.3    7.53  )-----------( 269      ( 13 Oct 2021 06:58 )             28.0     13 Oct 2021 07:01    139    |  108    |  35     ----------------------------<  150    3.7     |  23     |  2.90     Ca    8.7        13 Oct 2021 07:01    TPro  6.9    /  Alb  2.9    /  TBili  0.3    /  DBili  x      /  AST  14     /  ALT  20     /  AlkPhos  29     13 Oct 2021 07:01          Culture Results:   No Growth Final (10-07 @ 03:57)  Culture Results:   No Growth Final (10-07 @ 03:57)  Culture Results:   <10,000 CFU/mL Normal Urogenital Johnna (10-07 @ 02:42)          CARDIAC MARKERS ( 13 Oct 2021 07:01 )  .035 ng/mL / x     / 51 U/L / x     / 1.2 ng/mL  CARDIAC MARKERS ( 07 Oct 2021 14:20 )  .112 ng/mL / x     / x     / x     / x      CARDIAC MARKERS ( 07 Oct 2021 04:23 )  .145 ng/mL / x     / 106 U/L / x     / 2.2 ng/mL  CARDIAC MARKERS ( 06 Oct 2021 21:51 )  .149 ng/mL / x     / x     / x     / x              Culture - Blood (collected 07 Oct 2021 03:57)  Source: .Blood Blood-Peripheral  Final Report (12 Oct 2021 04:00):    No Growth Final    Culture - Blood (collected 07 Oct 2021 03:57)  Source: .Blood Blood-Peripheral  Final Report (12 Oct 2021 04:00):    No Growth Final    Culture - Urine (collected 07 Oct 2021 02:42)  Source: Clean Catch Clean Catch (Midstream)  Final Report (08 Oct 2021 12:49):    <10,000 CFU/mL Normal Urogenital Johnna         Anemia Panel:  Iron Total, Serum: 28 ug/dL (10-08-21 @ 22:27)  Iron - Total Binding Capacity.: 337 ug/dL (10-08-21 @ 22:27)  Vitamin B12, Serum: 413 pg/mL (10-08-21 @ 22:26)  Folate, Serum: 9.9 ng/mL (10-08-21 @ 22:26)  Ferritin, Serum: 28 ng/mL (10-08-21 @ 22:26)  Absolute Reticulocytes: 49.4 K/uL (10-08-21 @ 16:57)      Thyroid Panel:  Thyroid Stimulating Hormone, Serum: 0.89 uIU/mL (10-12-21 @ 07:27)  Thyroid Stimulating Hormone, Serum: 1.35 uIU/mL (10-07-21 @ 04:23)            Immunofixation, Serum:   Weak IgG Kappa Band Identified    Reference Range: None Detected (10-08-21 @ 23:37)  Serum Protein Electrophoresis Interp: Weak Gamma-Migrating Paraprotein Identified (10-08-21 @ 23:37)  Serum Pro-Brain Natriuretic Peptide: 7438 pg/mL (10-07-21 @ 04:23)  Serum Pro-Brain Natriuretic Peptide: 8061 pg/mL (10-06-21 @ 21:49)      RADIOLOGY & ADDITIONAL TESTS:      HEALTH ISSUES - PROBLEM Dx:  PNA (pneumonia)    Elevated troponin    Chronic atrial fibrillation    Stage 3 chronic kidney disease    Chronic diastolic congestive heart failure    CAD (coronary artery disease)    DVT prophylaxis    Anemia of chronic disease    DM (diabetes mellitus), type 2    Depression, major    Abnormal CT scan of lung    Hypertensive urgency    Multiple thyroid nodules            Consultant(s) Notes Reviewed:  [  ] YES     Care Discussed with [X] Consultants  [  ] Patient  [  ] Family [  ] HCP [  ]   [  ] Social Service  [  ] RN, [  ] Physical Therapy,[  ] Palliative care team  DVT PPX: [  ] Lovenox, [  ] S C Heparin, [  ] Coumadin, [  ] Xarelto, [ x ] Eliquis, [  ] Pradaxa, [  ] IV Heparin drip, [  ] SCD [  ] Contraindication 2 to GI Bleed,[  ] Ambulation [  ] Contraindicated 2 to  bleed [  ] Contraindicated 2 to Brain Bleed  Advanced directive: [x  ] None, [  ] DNR/DNI Patient is a 76y old  Male who presents with a chief complaint of multilobar PNA, afib (12 Oct 2021 15:29)    HPI:  77 yo M with PMHx of Type 2 DM (not on insulin), BPH, CAD s/p stents >4 years ago (not on plavix), diverticulitis (hospitalized 07/2020 at [facility]), GIB 2/2 diverticulosis (2020), anxiety, Lupus, HTN, HLD, CKD stage 3, HepC, 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 Pt states he would intermittently feel chills and feverish, recorded no temps at group home since last couple of days, he states progressively worsening of dyspnea on exertion and rest, patient unable to walk short distances or climb stairs due to dyspnea, however denies orthopnea, cough, sputum production, night sweats. Patient mentioned some dizziness associated with lower extremities weakness, usually ambulates independently but now feels unable to hold his weight. Regarding palpitations, pt states for 3 days his heart would skip a beat, return to normal. Self limited episodes under a minute, no associated chest pain. Pt also describes urinary frequency beyond baseline, denies dysuria or incontinence, hematuria, constipation.     ED course:   VS: Afebrile, HR: 80 BP: 220/100->235/116-> 189/85, Spo2: 98 on NC RR: 20  Labs significant for low stable hemoglobin, BUN/creatinine: 36/2.30, Trop 0.149, Pro BMP 8061.   EKG on admission showed Afib @ 64 bpm, incompleta right BBB, LVH  CT head shows no  evidence of acute intracranial hemorrhage, midline shift or CT evidence of acute territorial infarct.  CT chest A/P demonstrated Multilobar pneumonia. Mild cardiomegaly. No mall bowel obstruction or active bowel inflammation.  Patient was given in the ED 10 mg IVP hydralazine 10 mg Labetalol IVP 1x, 2000 cc bolus NS 1x            (07 Oct 2021 01:24)    INTERVAL HPI:   10/07/21: Pt seen and examined at bedside; in no acute distress; complains of intermittent palpitation and transient chest pain. On Zosyn 3.375gm q8h + azithromycin 500mg daily; concern for medication compliance; ??capacity. will consult psych. K replaced  10/8/21: Pt seen and examined at bedside. Pt is lethargic and is sleeping although answers with yes and no questions. He has no acute complaints. Denies fevers, chills, chest pain, SOB, abdominal pain, n/v/d/c. On Zosyn 3.375gm q8h + azithromycin 500mg daily. Blood pressure improving. PT recommended LETI yesterday.  10/9/21: Pt seen and examined at bedside. Pt with no complaints this am. Worsening Cr function this am; Nephro on board. Will continue to monitor.   10/10/21: Pt seen and examined at bedside with no complaints this am; On renally dosed zosyn; Add Zithromax, improving renal function this am; pending psych consultation with Dr Winkler.  10/11/21: Patient was seen and examined at bedside. Patient did not want to provide history and wanted to continue sleeping. Patient states he is very sleepy and wants rest. On IV Zosyn /Po Zithromax   10/12/21: Patient was seen and examined at bedside. Switched from IV Zosyn to Augmentin BID x2 days , Replaced K , PT---> HCPT  10/13/21: Patient was seen and examined at bedside. Patient was very drowsy and not able to hold meaningful conversation. Stated he does not have chest pain anymore and feels comfortable. Repeat echo performed, f/u results. 1st set of cardiac enzymes negative. 2nd set of cardiac enzymes - 0.053, 3rd set ordered- Dr. Gamboa aware       OVERNIGHT EVENTS:   Patient reported of non-reproducible chest pain localized to the right side of chest. Patient described it as squeezing and radiating to the left arm. Stated that the pain felt different from the prior chest pain incidents. EKG STAT was ordered and showed no changes from prior. First set of cardiac enzymes negative. 2nd follow up set ordered.     Home Medications:  cloNIDine 0.1 mg oral tablet: 2 tab(s) orally 3 times a day (07 Oct 2021 03:41)  digoxin 125 mcg (0.125 mg) oral tablet: 1 tab(s) orally once a day (07 Oct 2021 03:41)  Eliquis 5 mg oral tablet: 1 tab(s) orally 2 times a day (07 Oct 2021 03:41)  famotidine 40 mg oral tablet: 1 tab(s) orally once a day (at bedtime) (07 Oct 2021 03:41)  fenofibrate 145 mg oral tablet: 1 tab(s) orally once a day (07 Oct 2021 03:41)  labetalol 100 mg oral tablet: 1 tab(s) orally 2 times a day (07 Oct 2021 03:41)  Lasix 20 mg oral tablet: 1 tab(s) orally 2 times a day (07 Oct 2021 03:41)  oxybutynin 5 mg oral tablet: 1 tab(s) orally 4 times a day (07 Oct 2021 03:41)  pantoprazole 40 mg oral delayed release tablet: 1 tab(s) orally once a day (before a meal) (07 Oct 2021 03:41)  Symbicort 160 mcg-4.5 mcg/inh inhalation aerosol: 2 puff(s) inhaled 2 times a day (07 Oct 2021 03:41)      MEDICATIONS  (STANDING):  amLODIPine   Tablet 10 milliGRAM(s) Oral daily  amoxicillin  500 milliGRAM(s)/clavulanate 1 Tablet(s) Oral two times a day  apixaban 5 milliGRAM(s) Oral every 12 hours  ARIPiprazole 30 milliGRAM(s) Oral daily  aspirin enteric coated 81 milliGRAM(s) Oral daily  budesonide 160 MICROgram(s)/formoterol 4.5 MICROgram(s) Inhaler 2 Puff(s) Inhalation two times a day  cyanocobalamin 1000 MICROGram(s) Oral daily  dextrose 40% Gel 15 Gram(s) Oral once  dextrose 5%. 1000 milliLiter(s) (50 mL/Hr) IV Continuous <Continuous>  dextrose 50% Injectable 25 Gram(s) IV Push once  doxazosin 8 milliGRAM(s) Oral at bedtime  famotidine    Tablet 20 milliGRAM(s) Oral every 48 hours  folic acid 1 milliGRAM(s) Oral daily  glucagon  Injectable 1 milliGRAM(s) IntraMuscular once  hydrALAZINE 100 milliGRAM(s) Oral every 8 hours  insulin lispro (ADMELOG) corrective regimen sliding scale   SubCutaneous three times a day before meals  lamoTRIgine 100 milliGRAM(s) Oral daily  mirtazapine 30 milliGRAM(s) Oral at bedtime  nystatin Powder 1 Application(s) Topical two times a day  oxybutynin 5 milliGRAM(s) Oral four times a day  pantoprazole    Tablet 40 milliGRAM(s) Oral before breakfast  venlafaxine XR. 150 milliGRAM(s) Oral daily    MEDICATIONS  (PRN):  acetaminophen   Tablet .. 650 milliGRAM(s) Oral every 6 hours PRN Temp greater or equal to 38C (100.4F), Mild Pain (1 - 3)  melatonin 3 milliGRAM(s) Oral at bedtime PRN Insomnia  ondansetron Injectable 4 milliGRAM(s) IV Push every 8 hours PRN Nausea and/or Vomiting  sodium chloride 0.65% Nasal 1 Spray(s) Both Nostrils two times a day PRN Nasal Congestion      Allergies    No Known Allergies    Intolerances        Social History:  Tobacco: quit in 1980, not active smoker  EtOH: denies   Recreational drug use: cocaine several years ago "a few times" has not used in "many years"  Lives with: [facility]; group home; no nursing assistance; observation is there  Ambulates: independent, denies walker or cane but uses guard railings  ADLs: independent, but states need for assistance in bathing, cooking; independent with feeding, ambulating  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 No nausea, No vomiting; No diarrhea, No constipation. [  ] BM  GENITOURINARY: No dysuria, No frequency, No urgency, No hematuria, NO incontinence  NEUROLOGICAL: No headaches, No dizziness, No numbness, No tingling, No tremors, No weakness  EXT: No Swelling, No Pain, No Edema  SKIN:  [ x ] No itching, burning, rashes, or lesions   MUSCULOSKELETAL: No joint pain ,No Jt swelling; No muscle pain, No back pain, No extremity pain  PSYCHIATRIC: No depression,  No anxiety,  No mood swings ,No difficulty sleeping at night  PAIN SCALE: [ x ] None  [  ] Other-  ROS Unable to obtain due to - [  ] Dementia  [  ] Lethargy [x  ] Drowsy [  ] Sedated [  ] non verbal  REST OF REVIEW Of SYSTEM - [ x ] Normal     Vital Signs Last 24 Hrs  T(C): 36.6 (13 Oct 2021 04:43), Max: 36.7 (12 Oct 2021 11:55)  T(F): 97.9 (13 Oct 2021 04:43), Max: 98.1 (12 Oct 2021 11:55)  HR: 76 (13 Oct 2021 06:03) (69 - 76)  BP: 176/72 (13 Oct 2021 06:03) (156/74 - 197/76)  BP(mean): --  RR: 18 (13 Oct 2021 04:43) (18 - 18)  SpO2: 91% (13 Oct 2021 04:43) (91% - 93%)  Finger Stick        10-12 @ 07:01  -  10-13 @ 07:00  --------------------------------------------------------  IN: 360 mL / OUT: 0 mL / NET: 360 mL        PHYSICAL EXAM:  GENERAL:  [x  ] NAD , [  ] well appearing, [  ] Agitated, [ x ] Drowsy,  [  ] Lethargy, [  ] confused   HEAD:  [x ] Normal, [  ] Other  EYES:  [ x ] EOMI, [  ] PERRLA, [ x ] conjunctiva and sclera clear normal, [  ] Other,  [  ] Pallor,[  ] Discharge  ENMT:  [x  ] Normal, [ x ] Moist mucous membranes, [  ] Good dentition, [  ] No Thrush  NECK:  [ x ] Supple, [x  ] No JVD, [  ] Normal thyroid, [  ] Lymphadenopathy [  ] Other  CHEST/LUNG:  [x  ] Clear to auscultation bilaterally, [x  ] Breath Sounds equal B/L , [  ] poor effort  [ x ] No rales, [ x] No rhonchi  [ x ]  No wheezing,   HEART:  [ x ] Regular rate and rhythm, [  ] tachycardia, [  ] Bradycardia,  [  ] irregular  [ x ] No murmurs, No rubs, No gallops, [x  ] PPM in place   ABDOMEN:  [ x  ] Soft, [x  ] Nontender, [x  ] Nondistended, [  ] No mass, [  ] Bowel sounds present, [  ] obese  NERVOUS SYSTEM:  [  ] Alert & Oriented X3, [  ] Nonfocal  [  ] Confusion  [  ] Encephalopathic [  ] Sedated [x  ] Unable to assess, [  ] Dementia [  ] Other-  EXTREMITIES: [x  ] 2+ Peripheral Pulses, No clubbing, No cyanosis,  [  ] edema B/L lower EXT. [  ] PVD stasis skin changes B/L Lower EXT, [  ] wound  LYMPH: No lymphadenopathy noted  SKIN:  [x  ] No rashes or lesions, [ x ] Pressure Ulcers, [  ] ecchymosis, [  ] Skin Tears, [  ] Other    DIET: Diet, Consistent Carbohydrate Renal w/Evening Snack:   DASH/TLC Sodium & Cholesterol Restricted (10-07-21 @ 02:46)      LABS:                        8.3    7.53  )-----------( 269      ( 13 Oct 2021 06:58 )             28.0     13 Oct 2021 07:01    139    |  108    |  35     ----------------------------<  150    3.7     |  23     |  2.90     Ca    8.7        13 Oct 2021 07:01    TPro  6.9    /  Alb  2.9    /  TBili  0.3    /  DBili  x      /  AST  14     /  ALT  20     /  AlkPhos  29     13 Oct 2021 07:01          Culture Results:   No Growth Final (10-07 @ 03:57)  Culture Results:   No Growth Final (10-07 @ 03:57)  Culture Results:   <10,000 CFU/mL Normal Urogenital Johnna (10-07 @ 02:42)          CARDIAC MARKERS ( 13 Oct 2021 07:01 )  .035 ng/mL / x     / 51 U/L / x     / 1.2 ng/mL  CARDIAC MARKERS ( 07 Oct 2021 14:20 )  .112 ng/mL / x     / x     / x     / x      CARDIAC MARKERS ( 07 Oct 2021 04:23 )  .145 ng/mL / x     / 106 U/L / x     / 2.2 ng/mL  CARDIAC MARKERS ( 06 Oct 2021 21:51 )  .149 ng/mL / x     / x     / x     / x              Culture - Blood (collected 07 Oct 2021 03:57)  Source: .Blood Blood-Peripheral  Final Report (12 Oct 2021 04:00):    No Growth Final    Culture - Blood (collected 07 Oct 2021 03:57)  Source: .Blood Blood-Peripheral  Final Report (12 Oct 2021 04:00):    No Growth Final    Culture - Urine (collected 07 Oct 2021 02:42)  Source: Clean Catch Clean Catch (Midstream)  Final Report (08 Oct 2021 12:49):    <10,000 CFU/mL Normal Urogenital Johnna         Anemia Panel:  Iron Total, Serum: 28 ug/dL (10-08-21 @ 22:27)  Iron - Total Binding Capacity.: 337 ug/dL (10-08-21 @ 22:27)  Vitamin B12, Serum: 413 pg/mL (10-08-21 @ 22:26)  Folate, Serum: 9.9 ng/mL (10-08-21 @ 22:26)  Ferritin, Serum: 28 ng/mL (10-08-21 @ 22:26)  Absolute Reticulocytes: 49.4 K/uL (10-08-21 @ 16:57)      Thyroid Panel:  Thyroid Stimulating Hormone, Serum: 0.89 uIU/mL (10-12-21 @ 07:27)  Thyroid Stimulating Hormone, Serum: 1.35 uIU/mL (10-07-21 @ 04:23)            Immunofixation, Serum:   Weak IgG Kappa Band Identified    Reference Range: None Detected (10-08-21 @ 23:37)  Serum Protein Electrophoresis Interp: Weak Gamma-Migrating Paraprotein Identified (10-08-21 @ 23:37)  Serum Pro-Brain Natriuretic Peptide: 7438 pg/mL (10-07-21 @ 04:23)  Serum Pro-Brain Natriuretic Peptide: 8061 pg/mL (10-06-21 @ 21:49)      RADIOLOGY & ADDITIONAL TESTS:      HEALTH ISSUES - PROBLEM Dx:  PNA (pneumonia)    Elevated troponin    Chronic atrial fibrillation    Stage 3 chronic kidney disease    Chronic diastolic congestive heart failure    CAD (coronary artery disease)    DVT prophylaxis    Anemia of chronic disease    DM (diabetes mellitus), type 2    Depression, major    Abnormal CT scan of lung    Hypertensive urgency    Multiple thyroid nodules            Consultant(s) Notes Reviewed:  [  ] YES     Care Discussed with [X] Consultants  [  ] Patient  [  ] Family [  ] HCP [  ]   [  ] Social Service  [  ] RN, [  ] Physical Therapy,[  ] Palliative care team  DVT PPX: [  ] Lovenox, [  ] S C Heparin, [  ] Coumadin, [  ] Xarelto, [ x ] Eliquis, [  ] Pradaxa, [  ] IV Heparin drip, [  ] SCD [  ] Contraindication 2 to GI Bleed,[  ] Ambulation [  ] Contraindicated 2 to  bleed [  ] Contraindicated 2 to Brain Bleed  Advanced directive: [x  ] None, [  ] DNR/DNI Patient is a 76y old  Male who presents with a chief complaint of multilobar PNA, afib (12 Oct 2021 15:29)    HPI:  75 yo M with PMHx of Type 2 DM (not on insulin), BPH, CAD s/p stents >4 years ago (not on plavix), diverticulitis (hospitalized 07/2020 at [facility]), GIB 2/2 diverticulosis (2020), anxiety, Lupus, HTN, HLD, CKD stage 3, HepC, 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 Pt states he would intermittently feel chills and feverish, recorded no temps at group home since last couple of days, he states progressively worsening of dyspnea on exertion and rest, patient unable to walk short distances or climb stairs due to dyspnea, however denies orthopnea, cough, sputum production, night sweats. Patient mentioned some dizziness associated with lower extremities weakness, usually ambulates independently but now feels unable to hold his weight. Regarding palpitations, pt states for 3 days his heart would skip a beat, return to normal. Self limited episodes under a minute, no associated chest pain. Pt also describes urinary frequency beyond baseline, denies dysuria or incontinence, hematuria, constipation.     ED course:   VS: Afebrile, HR: 80 BP: 220/100->235/116-> 189/85, Spo2: 98 on NC RR: 20  Labs significant for low stable hemoglobin, BUN/creatinine: 36/2.30, Trop 0.149, Pro BMP 8061.   EKG on admission showed Afib @ 64 bpm, incompleta right BBB, LVH  CT head shows no  evidence of acute intracranial hemorrhage, midline shift or CT evidence of acute territorial infarct.  CT chest A/P demonstrated Multilobar pneumonia. Mild cardiomegaly. No mall bowel obstruction or active bowel inflammation.  Patient was given in the ED 10 mg IVP hydralazine 10 mg Labetalol IVP 1x, 2000 cc bolus NS 1x            (07 Oct 2021 01:24)    INTERVAL HPI:   10/07/21: Pt seen and examined at bedside; in no acute distress; complains of intermittent palpitation and transient chest pain. On Zosyn 3.375gm q8h + azithromycin 500mg daily; concern for medication compliance; ??capacity. will consult psych. K replaced  10/8/21: Pt seen and examined at bedside. Pt is lethargic and is sleeping although answers with yes and no questions. He has no acute complaints. Denies fevers, chills, chest pain, SOB, abdominal pain, n/v/d/c. On Zosyn 3.375gm q8h + azithromycin 500mg daily. Blood pressure improving. PT recommended LETI yesterday.  10/9/21: Pt seen and examined at bedside. Pt with no complaints this am. Worsening Cr function this am; Nephro on board. Will continue to monitor.   10/10/21: Pt seen and examined at bedside with no complaints this am; On renally dosed zosyn; Add Zithromax, improving renal function this am; pending psych consultation with Dr Winkler.  10/11/21: Patient was seen and examined at bedside. Patient did not want to provide history and wanted to continue sleeping. Patient states he is very sleepy and wants rest. On IV Zosyn /Po Zithromax   10/12/21: Patient was seen and examined at bedside. Switched from IV Zosyn to Augmentin BID x2 days , Replaced K , PT---> HCPT  10/13/21: Patient was seen and examined at bedside. Patient was very drowsy and not able to hold meaningful conversation. Stated he does not have chest pain anymore and feels comfortable. Repeat echo performed, f/u results. 1st set of cardiac enzymes negative. 2nd set of cardiac enzymes - 0.053, 3rd set ordered- Dr. Gamboa aware , follow CK/Tropo at 10 PM & in AM ,       OVERNIGHT EVENTS:   Patient reported of non-reproducible chest pain localized to the right side of chest. Patient described it as squeezing and radiating to the left arm. Stated that the pain felt different from the prior chest pain incidents. EKG STAT was ordered and showed no changes from prior. First set of cardiac enzymes negative. 2nd follow up set ordered.     Home Medications:  cloNIDine 0.1 mg oral tablet: 2 tab(s) orally 3 times a day (07 Oct 2021 03:41)  digoxin 125 mcg (0.125 mg) oral tablet: 1 tab(s) orally once a day (07 Oct 2021 03:41)  Eliquis 5 mg oral tablet: 1 tab(s) orally 2 times a day (07 Oct 2021 03:41)  famotidine 40 mg oral tablet: 1 tab(s) orally once a day (at bedtime) (07 Oct 2021 03:41)  fenofibrate 145 mg oral tablet: 1 tab(s) orally once a day (07 Oct 2021 03:41)  labetalol 100 mg oral tablet: 1 tab(s) orally 2 times a day (07 Oct 2021 03:41)  Lasix 20 mg oral tablet: 1 tab(s) orally 2 times a day (07 Oct 2021 03:41)  oxybutynin 5 mg oral tablet: 1 tab(s) orally 4 times a day (07 Oct 2021 03:41)  pantoprazole 40 mg oral delayed release tablet: 1 tab(s) orally once a day (before a meal) (07 Oct 2021 03:41)  Symbicort 160 mcg-4.5 mcg/inh inhalation aerosol: 2 puff(s) inhaled 2 times a day (07 Oct 2021 03:41)      MEDICATIONS  (STANDING):  amLODIPine   Tablet 10 milliGRAM(s) Oral daily  amoxicillin  500 milliGRAM(s)/clavulanate 1 Tablet(s) Oral two times a day  apixaban 5 milliGRAM(s) Oral every 12 hours  ARIPiprazole 30 milliGRAM(s) Oral daily  aspirin enteric coated 81 milliGRAM(s) Oral daily  budesonide 160 MICROgram(s)/formoterol 4.5 MICROgram(s) Inhaler 2 Puff(s) Inhalation two times a day  cyanocobalamin 1000 MICROGram(s) Oral daily  dextrose 40% Gel 15 Gram(s) Oral once  dextrose 5%. 1000 milliLiter(s) (50 mL/Hr) IV Continuous <Continuous>  dextrose 50% Injectable 25 Gram(s) IV Push once  doxazosin 8 milliGRAM(s) Oral at bedtime  famotidine    Tablet 20 milliGRAM(s) Oral every 48 hours  folic acid 1 milliGRAM(s) Oral daily  glucagon  Injectable 1 milliGRAM(s) IntraMuscular once  hydrALAZINE 100 milliGRAM(s) Oral every 8 hours  insulin lispro (ADMELOG) corrective regimen sliding scale   SubCutaneous three times a day before meals  lamoTRIgine 100 milliGRAM(s) Oral daily  mirtazapine 30 milliGRAM(s) Oral at bedtime  nystatin Powder 1 Application(s) Topical two times a day  oxybutynin 5 milliGRAM(s) Oral four times a day  pantoprazole    Tablet 40 milliGRAM(s) Oral before breakfast  venlafaxine XR. 150 milliGRAM(s) Oral daily    MEDICATIONS  (PRN):  acetaminophen   Tablet .. 650 milliGRAM(s) Oral every 6 hours PRN Temp greater or equal to 38C (100.4F), Mild Pain (1 - 3)  melatonin 3 milliGRAM(s) Oral at bedtime PRN Insomnia  ondansetron Injectable 4 milliGRAM(s) IV Push every 8 hours PRN Nausea and/or Vomiting  sodium chloride 0.65% Nasal 1 Spray(s) Both Nostrils two times a day PRN Nasal Congestion      Allergies    No Known Allergies    Intolerances        Social History:  Tobacco: quit in 1980, not active smoker  EtOH: denies   Recreational drug use: cocaine several years ago "a few times" has not used in "many years"  Lives with: [facility]; group home; no nursing assistance; observation is there  Ambulates: independent, denies walker or cane but uses guard railings  ADLs: independent, but states need for assistance in bathing, cooking; independent with feeding, ambulating  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[place]  Vaccinations: Moderna x2 doses last dose in May (07 Oct 2021 01:24)      REVIEW OF SYSTEMS: i feel fine  CONSTITUTIONAL: No fever, No chills, No fatigue, No myalgia, No Body ache, No Weakness  EYES: No eye pain,  No visual disturbances, No discharge, NO Redness  ENMT:  No ear pain, No nose bleed, No vertigo; No sinus pain, NO throat pain, No Congestion  NECK: No pain, No stiffness  RESPIRATORY: No cough, NO wheezing, No  hemoptysis, NO  shortness of breath  CARDIOVASCULAR: No chest pain, palpitations  GASTROINTESTINAL: No abdominal pain, NO epigastric pain. No nausea, No vomiting; No diarrhea, No constipation. [  ] BM  GENITOURINARY: No dysuria, No frequency, No urgency, No hematuria, NO incontinence  NEUROLOGICAL: No headaches, No dizziness, No numbness, No tingling, No tremors, No weakness  EXT: No Swelling, No Pain, No Edema  SKIN:  [ x ] No itching, burning, rashes, or lesions   MUSCULOSKELETAL: No joint pain ,No Jt swelling; No muscle pain, No back pain, No extremity pain  PSYCHIATRIC: No depression,  No anxiety,  No mood swings ,No difficulty sleeping at night  PAIN SCALE: [ x ] None  [  ] Other-  ROS Unable to obtain due to - [  ] Dementia  [  ] Lethargy [x  ] Drowsy [  ] Sedated [  ] non verbal  REST OF REVIEW Of SYSTEM - [ x ] Normal     Vital Signs Last 24 Hrs  T(C): 36.6 (13 Oct 2021 04:43), Max: 36.7 (12 Oct 2021 11:55)  T(F): 97.9 (13 Oct 2021 04:43), Max: 98.1 (12 Oct 2021 11:55)  HR: 76 (13 Oct 2021 06:03) (69 - 76)  BP: 176/72 (13 Oct 2021 06:03) (156/74 - 197/76)  BP(mean): --  RR: 18 (13 Oct 2021 04:43) (18 - 18)  SpO2: 91% (13 Oct 2021 04:43) (91% - 93%)  Finger Stick        10-12 @ 07:01  -  10-13 @ 07:00  --------------------------------------------------------  IN: 360 mL / OUT: 0 mL / NET: 360 mL        PHYSICAL EXAM:  GENERAL:  [x  ] NAD  [ x ] well appearing, [  ] Agitated, [ x ] Drowsy,  [  ] Lethargy, [  ] confused   HEAD:  [x ] Normal, [  ] Other  EYES:  [ x ] EOMI, [  ] PERRLA, [ x ] conjunctiva and sclera clear normal, [  ] Other,  [  ] Pallor,[  ] Discharge  ENMT:  [x  ] Normal, [ x ] Moist mucous membranes, [ x ] Good dentition, [ x ] No Thrush  NECK:  [ x ] Supple, [x  ] No JVD, [x  ] Normal thyroid, [  ] Lymphadenopathy [  ] Other  CHEST/LUNG:  [x  ] Clear to auscultation bilaterally, [x  ] Breath Sounds equal B/L , [x  ] poor effort  [ x ] No rales, [ x] No rhonchi  [ x ]  No wheezing,   HEART:  [ x ] Regular rate and rhythm, [  ] tachycardia, [  ] Bradycardia,  [  ] irregular  [ x ] No murmurs, No rubs, No gallops, [x  ] PPM in place   ABDOMEN:  [ x  ] Soft, [x  ] Nontender, [x  ] Nondistended, [ x ] No mass, [x  ] Bowel sounds present, [x  ] obese  NERVOUS SYSTEM:  [ x ] Alert & Oriented X3, [x  ] Nonfocal  [  ] Confusion  [  ] Encephalopathic [  ] Sedated [x  ] Unable to assess, [  ] Dementia [  ] Other-  EXTREMITIES: [x  ] 2+ Peripheral Pulses, No clubbing, No cyanosis,  [ x ]  2 + pitting edema B/L lower EXT. [  ] PVD stasis skin changes B/L Lower EXT, [  ] wound  LYMPH: No lymphadenopathy noted  SKIN:  [x  ] No rashes or lesions, [ x ] Pressure Ulcers, [  ] ecchymosis, [  ] Skin Tears, [ x ] Other-Tattoos on Ext    DIET: Diet, Consistent Carbohydrate Renal w/Evening Snack:   DASH/TLC Sodium & Cholesterol Restricted (10-07-21 @ 02:46)      LABS:                        8.3    7.53  )-----------( 269      ( 13 Oct 2021 06:58 )             28.0     13 Oct 2021 07:01    139    |  108    |  35     ----------------------------<  150    3.7     |  23     |  2.90     Ca    8.7        13 Oct 2021 07:01    TPro  6.9    /  Alb  2.9    /  TBili  0.3    /  DBili  x      /  AST  14     /  ALT  20     /  AlkPhos  29     13 Oct 2021 07:01          Culture Results:   No Growth Final (10-07 @ 03:57)  Culture Results:   No Growth Final (10-07 @ 03:57)  Culture Results:   <10,000 CFU/mL Normal Urogenital Johnna (10-07 @ 02:42)          CARDIAC MARKERS ( 13 Oct 2021 07:01 )  .035 ng/mL / x     / 51 U/L / x     / 1.2 ng/mL  CARDIAC MARKERS ( 07 Oct 2021 14:20 )  .112 ng/mL / x     / x     / x     / x      CARDIAC MARKERS ( 07 Oct 2021 04:23 )  .145 ng/mL / x     / 106 U/L / x     / 2.2 ng/mL  CARDIAC MARKERS ( 06 Oct 2021 21:51 )  .149 ng/mL / x     / x     / x     / x              Culture - Blood (collected 07 Oct 2021 03:57)  Source: .Blood Blood-Peripheral  Final Report (12 Oct 2021 04:00):    No Growth Final    Culture - Blood (collected 07 Oct 2021 03:57)  Source: .Blood Blood-Peripheral  Final Report (12 Oct 2021 04:00):    No Growth Final    Culture - Urine (collected 07 Oct 2021 02:42)  Source: Clean Catch Clean Catch (Midstream)  Final Report (08 Oct 2021 12:49):    <10,000 CFU/mL Normal Urogenital Johnna         Anemia Panel:  Iron Total, Serum: 28 ug/dL (10-08-21 @ 22:27)  Iron - Total Binding Capacity.: 337 ug/dL (10-08-21 @ 22:27)  Vitamin B12, Serum: 413 pg/mL (10-08-21 @ 22:26)  Folate, Serum: 9.9 ng/mL (10-08-21 @ 22:26)  Ferritin, Serum: 28 ng/mL (10-08-21 @ 22:26)  Absolute Reticulocytes: 49.4 K/uL (10-08-21 @ 16:57)      Thyroid Panel:  Thyroid Stimulating Hormone, Serum: 0.89 uIU/mL (10-12-21 @ 07:27)  Thyroid Stimulating Hormone, Serum: 1.35 uIU/mL (10-07-21 @ 04:23)      Immunofixation, Serum:   Weak IgG Kappa Band Identified    Reference Range: None Detected (10-08-21 @ 23:37)  Serum Protein Electrophoresis Interp: Weak Gamma-Migrating Paraprotein Identified (10-08-21 @ 23:37)  Serum Pro-Brain Natriuretic Peptide: 7438 pg/mL (10-07-21 @ 04:23)  Serum Pro-Brain Natriuretic Peptide: 8061 pg/mL (10-06-21 @ 21:49)      RADIOLOGY & ADDITIONAL TESTS: NONE      HEALTH ISSUES - PROBLEM Dx:  PNA (pneumonia)    Elevated troponin    Chronic atrial fibrillation    Stage 3 chronic kidney disease    Chronic diastolic congestive heart failure    CAD (coronary artery disease)    DVT prophylaxis    Anemia of chronic disease    DM (diabetes mellitus), type 2    Depression, major    Abnormal CT scan of lung    Hypertensive urgency    Multiple thyroid nodules        Consultant(s) Notes Reviewed:  [x  ] YES     Care Discussed with [X] Consultants  [ x ] Patient  [  ] Family [  ] HCP [  ]   [ x ] Social Service  [x ] RN, [  ] Physical Therapy,[  ] Palliative care team  DVT PPX: [  ] Lovenox, [  ] S C Heparin, [  ] Coumadin, [  ] Xarelto, [ x ] Eliquis, [  ] Pradaxa, [  ] IV Heparin drip, [  ] SCD [  ] Contraindication 2 to GI Bleed,[  ] Ambulation [  ] Contraindicated 2 to  bleed [  ] Contraindicated 2 to Brain Bleed  Advanced directive: [x  ] None, [  ] DNR/DNI Patient is a 76y old  Male who presents with a chief complaint of multilobar PNA, afib (12 Oct 2021 15:29)    HPI:  75 yo M with PMHx of Type 2 DM (not on insulin), BPH, CAD s/p stents >4 years ago (not on plavix), diverticulitis (hospitalized 07/2020 at [facility]), GIB 2/2 diverticulosis (2020), anxiety, Lupus, HTN, HLD, CKD stage 3, HepC, 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 Pt states he would intermittently feel chills and feverish, recorded no temps at group home since last couple of days, he states progressively worsening of dyspnea on exertion and rest, patient unable to walk short distances or climb stairs due to dyspnea, however denies orthopnea, cough, sputum production, night sweats. Patient mentioned some dizziness associated with lower extremities weakness, usually ambulates independently but now feels unable to hold his weight. Regarding palpitations, pt states for 3 days his heart would skip a beat, return to normal. Self limited episodes under a minute, no associated chest pain. Pt also describes urinary frequency beyond baseline, denies dysuria or incontinence, hematuria, constipation.     ED course:   VS: Afebrile, HR: 80 BP: 220/100->235/116-> 189/85, Spo2: 98 on NC RR: 20  Labs significant for low stable hemoglobin, BUN/creatinine: 36/2.30, Trop 0.149, Pro BMP 8061.   EKG on admission showed Afib @ 64 bpm, incompleta right BBB, LVH  CT head shows no  evidence of acute intracranial hemorrhage, midline shift or CT evidence of acute territorial infarct.  CT chest A/P demonstrated Multilobar pneumonia. Mild cardiomegaly. No mall bowel obstruction or active bowel inflammation.  Patient was given in the ED 10 mg IVP hydralazine 10 mg Labetalol IVP 1x, 2000 cc bolus NS 1x            (07 Oct 2021 01:24)    INTERVAL HPI:   10/07/21: Pt seen and examined at bedside; in no acute distress; complains of intermittent palpitation and transient chest pain. On Zosyn 3.375gm q8h + azithromycin 500mg daily; concern for medication compliance; ??capacity. will consult psych. K replaced  10/8/21: Pt seen and examined at bedside. Pt is lethargic and is sleeping although answers with yes and no questions. He has no acute complaints. Denies fevers, chills, chest pain, SOB, abdominal pain, n/v/d/c. On Zosyn 3.375gm q8h + azithromycin 500mg daily. Blood pressure improving. PT recommended LETI yesterday.  10/9/21: Pt seen and examined at bedside. Pt with no complaints this am. Worsening Cr function this am; Nephro on board. Will continue to monitor.   10/10/21: Pt seen and examined at bedside with no complaints this am; On renally dosed zosyn; Add Zithromax, improving renal function this am; pending psych consultation with Dr Winkler.  10/11/21: Patient was seen and examined at bedside. Patient did not want to provide history and wanted to continue sleeping. Patient states he is very sleepy and wants rest. On IV Zosyn /Po Zithromax   10/12/21: Patient was seen and examined at bedside. Switched from IV Zosyn to Augmentin BID x2 days , Replaced K , PT---> HCPT  10/13/21: Patient was seen and examined at bedside. Patient was very drowsy and not able to hold meaningful conversation. Stated he does not have chest pain anymore and feels comfortable. Repeat echo performed, f/u results. 1st set of cardiac enzymes negative. 2nd set of cardiac enzymes - 0.053, 3rd set ordered- Dr. Gamboa aware , follow CK/Tropo at 10 PM & in AM ,       OVERNIGHT EVENTS:   Patient reported of non-reproducible chest pain localized to the right side of chest. Patient described it as squeezing and radiating to the left arm. Stated that the pain felt different from the prior chest pain incidents. EKG STAT was ordered and showed no changes from prior. First set of cardiac enzymes negative. 2nd follow up set ordered.     Home Medications:  cloNIDine 0.1 mg oral tablet: 2 tab(s) orally 3 times a day (07 Oct 2021 03:41)  digoxin 125 mcg (0.125 mg) oral tablet: 1 tab(s) orally once a day (07 Oct 2021 03:41)  Eliquis 5 mg oral tablet: 1 tab(s) orally 2 times a day (07 Oct 2021 03:41)  famotidine 40 mg oral tablet: 1 tab(s) orally once a day (at bedtime) (07 Oct 2021 03:41)  fenofibrate 145 mg oral tablet: 1 tab(s) orally once a day (07 Oct 2021 03:41)  labetalol 100 mg oral tablet: 1 tab(s) orally 2 times a day (07 Oct 2021 03:41)  Lasix 20 mg oral tablet: 1 tab(s) orally 2 times a day (07 Oct 2021 03:41)  oxybutynin 5 mg oral tablet: 1 tab(s) orally 4 times a day (07 Oct 2021 03:41)  pantoprazole 40 mg oral delayed release tablet: 1 tab(s) orally once a day (before a meal) (07 Oct 2021 03:41)  Symbicort 160 mcg-4.5 mcg/inh inhalation aerosol: 2 puff(s) inhaled 2 times a day (07 Oct 2021 03:41)      MEDICATIONS  (STANDING):  amLODIPine   Tablet 10 milliGRAM(s) Oral daily  amoxicillin  500 milliGRAM(s)/clavulanate 1 Tablet(s) Oral two times a day  apixaban 5 milliGRAM(s) Oral every 12 hours  ARIPiprazole 30 milliGRAM(s) Oral daily  aspirin enteric coated 81 milliGRAM(s) Oral daily  budesonide 160 MICROgram(s)/formoterol 4.5 MICROgram(s) Inhaler 2 Puff(s) Inhalation two times a day  cyanocobalamin 1000 MICROGram(s) Oral daily  dextrose 40% Gel 15 Gram(s) Oral once  dextrose 5%. 1000 milliLiter(s) (50 mL/Hr) IV Continuous <Continuous>  dextrose 50% Injectable 25 Gram(s) IV Push once  doxazosin 8 milliGRAM(s) Oral at bedtime  famotidine    Tablet 20 milliGRAM(s) Oral every 48 hours  folic acid 1 milliGRAM(s) Oral daily  glucagon  Injectable 1 milliGRAM(s) IntraMuscular once  hydrALAZINE 100 milliGRAM(s) Oral every 8 hours  insulin lispro (ADMELOG) corrective regimen sliding scale   SubCutaneous three times a day before meals  lamoTRIgine 100 milliGRAM(s) Oral daily  mirtazapine 30 milliGRAM(s) Oral at bedtime  nystatin Powder 1 Application(s) Topical two times a day  oxybutynin 5 milliGRAM(s) Oral four times a day  pantoprazole    Tablet 40 milliGRAM(s) Oral before breakfast  venlafaxine XR. 150 milliGRAM(s) Oral daily    MEDICATIONS  (PRN):  acetaminophen   Tablet .. 650 milliGRAM(s) Oral every 6 hours PRN Temp greater or equal to 38C (100.4F), Mild Pain (1 - 3)  melatonin 3 milliGRAM(s) Oral at bedtime PRN Insomnia  ondansetron Injectable 4 milliGRAM(s) IV Push every 8 hours PRN Nausea and/or Vomiting  sodium chloride 0.65% Nasal 1 Spray(s) Both Nostrils two times a day PRN Nasal Congestion      Allergies    No Known Allergies    Intolerances        Social History:  Tobacco: quit in 1980, not active smoker  EtOH: denies   Recreational drug use: cocaine several years ago "a few times" has not used in "many years"  Lives with: [facility]; group home; no nursing assistance; observation is there  Ambulates: independent, denies walker or cane but uses guard railings  ADLs: independent, but states need for assistance in bathing, cooking; independent with feeding, ambulating  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[place]  Vaccinations: Moderna x2 doses last dose in May (07 Oct 2021 01:24)      REVIEW OF SYSTEMS: i feel fine  CONSTITUTIONAL: No fever, No chills, No fatigue, No myalgia, No Body ache, No Weakness  EYES: No eye pain,  No visual disturbances, No discharge, NO Redness  ENMT:  No ear pain, No nose bleed, No vertigo; No sinus pain, NO throat pain, No Congestion  NECK: No pain, No stiffness  RESPIRATORY: No cough, NO wheezing, No  hemoptysis, NO  shortness of breath  CARDIOVASCULAR: No chest pain, palpitations  GASTROINTESTINAL: No abdominal pain, NO epigastric pain. No nausea, No vomiting; No diarrhea, No constipation. [  ] BM  GENITOURINARY: No dysuria, No frequency, No urgency, No hematuria, NO incontinence  NEUROLOGICAL: No headaches, No dizziness, No numbness, No tingling, No tremors, No weakness  EXT: No Swelling, No Pain, No Edema  SKIN:  [ x ] No itching, burning, rashes, or lesions   MUSCULOSKELETAL: No joint pain ,No Jt swelling; No muscle pain, No back pain, No extremity pain  PSYCHIATRIC: No depression,  No anxiety,  No mood swings ,No difficulty sleeping at night  PAIN SCALE: [ x ] None  [  ] Other-  ROS Unable to obtain due to - [  ] Dementia  [  ] Lethargy [x  ] Drowsy [  ] Sedated [  ] non verbal  REST OF REVIEW Of SYSTEM - [ x ] Normal     Vital Signs Last 24 Hrs  T(C): 36.6 (13 Oct 2021 04:43), Max: 36.7 (12 Oct 2021 11:55)  T(F): 97.9 (13 Oct 2021 04:43), Max: 98.1 (12 Oct 2021 11:55)  HR: 76 (13 Oct 2021 06:03) (69 - 76)  BP: 176/72 (13 Oct 2021 06:03) (156/74 - 197/76)  BP(mean): --  RR: 18 (13 Oct 2021 04:43) (18 - 18)  SpO2: 91% (13 Oct 2021 04:43) (91% - 93%)  Finger Stick        10-12 @ 07:01  -  10-13 @ 07:00  --------------------------------------------------------  IN: 360 mL / OUT: 0 mL / NET: 360 mL        PHYSICAL EXAM:  GENERAL:  [x  ] NAD  [ x ] well appearing, [  ] Agitated, [ x ] Drowsy,  [  ] Lethargy, [  ] confused   HEAD:  [x ] Normal, [  ] Other  EYES:  [ x ] EOMI, [  ] PERRLA, [ x ] conjunctiva and sclera clear normal, [  ] Other,  [  ] Pallor,[  ] Discharge  ENMT:  [x  ] Normal, [ x ] Moist mucous membranes, [ x ] Good dentition, [ x ] No Thrush  NECK:  [ x ] Supple, [x  ] No JVD, [x  ] Normal thyroid, [  ] Lymphadenopathy [  ] Other  CHEST/LUNG:  [x  ] Clear to auscultation bilaterally, [x  ] Breath Sounds equal B/L , [x  ] poor effort  [ x ] No rales, [ x] No rhonchi  [ x ]  No wheezing,   HEART:  [ x ] Regular rate and rhythm, [  ] tachycardia, [  ] Bradycardia,  [  ] irregular  [ x ] No murmurs, No rubs, No gallops, [x  ] PPM in place   ABDOMEN:  [ x  ] Soft, [x  ] Nontender, [x  ] Nondistended, [ x ] No mass, [x  ] Bowel sounds present, [x  ] obese  NERVOUS SYSTEM:  [ x ] Alert & Oriented X3, [x  ] Nonfocal  [  ] Confusion  [  ] Encephalopathic [  ] Sedated [x  ] Unable to assess, [  ] Dementia [  ] Other-  EXTREMITIES: [x  ] 2+ Peripheral Pulses, No clubbing, No cyanosis,  [ x ]  2 + pitting edema B/L lower EXT. [  ] PVD stasis skin changes B/L Lower EXT, [  ] wound  LYMPH: No lymphadenopathy noted  SKIN:  [x  ] No rashes or lesions, [ x ] Pressure Ulcers, [  ] ecchymosis, [  ] Skin Tears, [ x ] Other-Tattoos on Ext    DIET: Diet, Consistent Carbohydrate Renal w/Evening Snack:   DASH/TLC Sodium & Cholesterol Restricted (10-07-21 @ 02:46)      LABS:                        8.3    7.53  )-----------( 269      ( 13 Oct 2021 06:58 )             28.0     13 Oct 2021 07:01    139    |  108    |  35     ----------------------------<  150    3.7     |  23     |  2.90     Ca    8.7        13 Oct 2021 07:01    TPro  6.9    /  Alb  2.9    /  TBili  0.3    /  DBili  x      /  AST  14     /  ALT  20     /  AlkPhos  29     13 Oct 2021 07:01          Culture Results:   No Growth Final (10-07 @ 03:57)  Culture Results:   No Growth Final (10-07 @ 03:57)  Culture Results:   <10,000 CFU/mL Normal Urogenital Johnna (10-07 @ 02:42)          CARDIAC MARKERS ( 13 Oct 2021 07:01 )  .035 ng/mL / x     / 51 U/L / x     / 1.2 ng/mL  CARDIAC MARKERS ( 07 Oct 2021 14:20 )  .112 ng/mL / x     / x     / x     / x      CARDIAC MARKERS ( 07 Oct 2021 04:23 )  .145 ng/mL / x     / 106 U/L / x     / 2.2 ng/mL  CARDIAC MARKERS ( 06 Oct 2021 21:51 )  .149 ng/mL / x     / x     / x     / x              Culture - Blood (collected 07 Oct 2021 03:57)  Source: .Blood Blood-Peripheral  Final Report (12 Oct 2021 04:00):    No Growth Final    Culture - Blood (collected 07 Oct 2021 03:57)  Source: .Blood Blood-Peripheral  Final Report (12 Oct 2021 04:00):    No Growth Final    Culture - Urine (collected 07 Oct 2021 02:42)  Source: Clean Catch Clean Catch (Midstream)  Final Report (08 Oct 2021 12:49):    <10,000 CFU/mL Normal Urogenital Johnna         Anemia Panel:  Iron Total, Serum: 28 ug/dL (10-08-21 @ 22:27)  Iron - Total Binding Capacity.: 337 ug/dL (10-08-21 @ 22:27)  Vitamin B12, Serum: 413 pg/mL (10-08-21 @ 22:26)  Folate, Serum: 9.9 ng/mL (10-08-21 @ 22:26)  Ferritin, Serum: 28 ng/mL (10-08-21 @ 22:26)  Absolute Reticulocytes: 49.4 K/uL (10-08-21 @ 16:57)      Thyroid Panel:  Thyroid Stimulating Hormone, Serum: 0.89 uIU/mL (10-12-21 @ 07:27)  Thyroid Stimulating Hormone, Serum: 1.35 uIU/mL (10-07-21 @ 04:23)      Immunofixation, Serum:   Weak IgG Kappa Band Identified    Reference Range: None Detected (10-08-21 @ 23:37)  Serum Protein Electrophoresis Interp: Weak Gamma-Migrating Paraprotein Identified (10-08-21 @ 23:37)  Serum Pro-Brain Natriuretic Peptide: 7438 pg/mL (10-07-21 @ 04:23)  Serum Pro-Brain Natriuretic Peptide: 8061 pg/mL (10-06-21 @ 21:49)      RADIOLOGY & ADDITIONAL TESTS:   < from: US Thyroid (10.12.21 @ 10:33) >  EXAM:  US THYROID ONLY                            PROCEDURE DATE:  10/12/2021          INTERPRETATION:  CLINICAL INFORMATION: Thyroid nodules.    COMPARISON: None available.    TECHNIQUE: Sonography of the thyroid.    FINDINGS:  Right Lobe: 2.9 cm x  1.4 cm x 2.0 cm. Heterogeneous in echotexture.    Left Lobe: 4.2 cm x 1.9 cm x 2.2 cm. Multiple nodules. For example:  *  Isoechoic nodule in the midpole, measuring 1.0 x 0.7 x 0.7 cm (TI-RAD 3)  *  Upper pole cyst, measuring 0.5 x 0.3 x 0.3 cm  *Solid hypoechoic nodule in the upper pole, measuring 0.6 x 0.4 x 0.7 cm (TI-RAD 4)    Isthmus: 4 mm.    Cervical Lymph Nodes: No enlarged or abnormal morphology cervical nodes.    IMPRESSION:    Multiple left thyroid nodules, measuring 1.0 cm in the mid pole and 0.7 cm in the upper pole (TI-RAD 3 and 4).    TI-RAD 4: Moderately suspicious (FNA if > 1.5 cm, Follow if > 1 cm)  __________________________________  ACR Thyroid Imaging, Reporting and Data System (TI-RADS): White Paper of the ACR TI-RADS Committee. J Am Bharathi Radiol 2017;14:587-595.    TI-RAD 1: Benign (No FNA)  TI-RAD 2: Not suspicious (No FNA)  TI-RAD 3: Mildly suspicious (FNA if > 2.5 cm, Follow if >1.5 cm)  TI-RAD 4: Moderately suspicious (FNA if > 1.5 cm, Follow if > 1 cm)  TI-RAD 5: Highly suspicious (FNA if > 1 cm, Follow if > 0.5 cm)    --- End of Report ---        < end of copied text >        HEALTH ISSUES - PROBLEM Dx:  PNA (pneumonia)    Elevated troponin    Chronic atrial fibrillation    Stage 3 chronic kidney disease    Chronic diastolic congestive heart failure    CAD (coronary artery disease)    DVT prophylaxis    Anemia of chronic disease    DM (diabetes mellitus), type 2    Depression, major    Abnormal CT scan of lung    Hypertensive urgency    Multiple thyroid nodules        Consultant(s) Notes Reviewed:  [x  ] YES     Care Discussed with [X] Consultants  [ x ] Patient  [  ] Family [  ] HCP [  ]   [ x ] Social Service  [x ] RN, [  ] Physical Therapy,[  ] Palliative care team  DVT PPX: [  ] Lovenox, [  ] S C Heparin, [  ] Coumadin, [  ] Xarelto, [ x ] Eliquis, [  ] Pradaxa, [  ] IV Heparin drip, [  ] SCD [  ] Contraindication 2 to GI Bleed,[  ] Ambulation [  ] Contraindicated 2 to  bleed [  ] Contraindicated 2 to Brain Bleed  Advanced directive: [x  ] None, [  ] DNR/DNI

## 2021-10-13 NOTE — PROGRESS NOTE ADULT - SUBJECTIVE AND OBJECTIVE BOX
CAPILLARY BLOOD GLUCOSE      POCT Blood Glucose.: 139 mg/dL (13 Oct 2021 07:57)  POCT Blood Glucose.: 112 mg/dL (13 Oct 2021 04:13)  POCT Blood Glucose.: 121 mg/dL (12 Oct 2021 21:45)  POCT Blood Glucose.: 104 mg/dL (12 Oct 2021 16:48)  POCT Blood Glucose.: 129 mg/dL (12 Oct 2021 11:47)      Vital Signs Last 24 Hrs  T(C): 36.6 (13 Oct 2021 04:43), Max: 36.7 (12 Oct 2021 11:55)  T(F): 97.9 (13 Oct 2021 04:43), Max: 98.1 (12 Oct 2021 11:55)  HR: 76 (13 Oct 2021 06:03) (69 - 76)  BP: 176/72 (13 Oct 2021 06:03) (156/74 - 197/76)  BP(mean): --  RR: 18 (13 Oct 2021 04:43) (18 - 18)  SpO2: 91% (13 Oct 2021 04:43) (91% - 93%)    General: WN/WD NAD  Respiratory: CTA B/L  CV: RRR, S1S2, no murmurs, rubs or gallops  Abdominal: Soft, NT, ND +BS, Last BM  Extremities: No edema, + peripheral pulses     10-13    139  |  108  |  35<H>  ----------------------------<  150<H>  3.7   |  23  |  2.90<H>    Ca    8.7      13 Oct 2021 07:01    TPro  6.9  /  Alb  2.9<L>  /  TBili  0.3  /  DBili  x   /  AST  14<L>  /  ALT  20  /  AlkPhos  29<L>  10-13      dextrose 40% Gel 15 Gram(s) Oral once  dextrose 50% Injectable 25 Gram(s) IV Push once  glucagon  Injectable 1 milliGRAM(s) IntraMuscular once  insulin lispro (ADMELOG) corrective regimen sliding scale   SubCutaneous three times a day before meals

## 2021-10-13 NOTE — PROGRESS NOTE ADULT - PROBLEM SELECTOR PLAN 4
likely demand ischemia in setting of hypertensive urgency   - Troponin trended to peak  - NOT ACS per cardiology Dr. Gamboa. likely demand ischemia in setting of hypertensive urgency   - Troponin trended to peak   - NOT ACS per cardiology Dr. Gamboa likely demand ischemia in setting of hypertensive urgency / Uncontrolled BP  - Troponin trended to peak   - NOT ACS per cardiology Dr. Gamboa D/W today   -Repeat CK/Tropo x 2 more   on ASA   ECHO was done

## 2021-10-13 NOTE — DISCHARGE NOTE NURSING/CASE MANAGEMENT/SOCIAL WORK - NSDCVIVACCINE_GEN_ALL_CORE_FT
Tdap; 24-Jul-2020 09:31; Valdez Watson (RN); [employer]; [id number] (Exp. Date: 17-Sep-2021); IntraMuscular; Deltoid Left.; 0.5 milliLiter(s); VIS (VIS Published: 09-May-2013, VIS Presented: 24-Jul-2020);

## 2021-10-13 NOTE — PROGRESS NOTE ADULT - PROBLEM SELECTOR PLAN 7
-Patient presenting with worsening dyspnea on rest and exertion   - Appears euvolemic on exam today  - Last TTE on 07/21  Last TTE on 7/21/21 demonstrate normal left ventricular systolic function, estimated LVEF of 60%.LV diastolic dysfunction.  - On admission Pro BNP 8061 elevated compare to previous one on July 4140  - s/p IVF 2 lt bolus in the ED   - On Lasix 20 mg BID, will continue w/ renal function monitor   - Strict I&O's.

## 2021-10-13 NOTE — CHART NOTE - NSCHARTNOTEFT_GEN_A_CORE
Called by RN for Pt c/o chest pain. Patient seen and examined at bedside. Patient states CP began a few hours ago, resolved, and then returned. Pain is localized to the right of his sternum. He describes it as squeezing and rates it 6/10. Reports when it began, it radiated down left arm. Pain is non-reproducible. Patient admits to having chest pain prior, but that this is "different." Denies palpitations, shortness of breath, diaphoresis, nausea, vomiting.        T(C): 36.6 (10-13-21 @ 04:43), Max: 36.7 (10-12-21 @ 11:55)  HR: 72 (10-13-21 @ 04:56) (67 - 74)  BP: 188/74 (10-13-21 @ 04:56) (156/74 - 197/76)  RR: 18 (10-13-21 @ 04:43) (18 - 18)  SpO2: 91% (10-13-21 @ 04:43) (91% - 93%)  Wt(kg): --    Physical :  Gen- NAD, ncat  Cardio - s+1,s+2, rrr, no murmur  Lung - cta b/l, no wheeze, no rhonchi, no rales   Abdomen- +BS, NT/ND, no guarding, no rebound, no masses  Ext- no edema, 2+ pulses b/l  Neuro- CN grossly intact    LABS:                        8.0    5.77  )-----------( 248      ( 12 Oct 2021 07:27 )             26.5     10-12    140  |  110<H>  |  39<H>  ----------------------------<  102<H>  3.4<L>   |  23  |  2.80<H>    Ca    8.7      12 Oct 2021 07:27    TPro  6.4  /  Alb  2.7<L>  /  TBili  0.2  /  DBili  x   /  AST  11<L>  /  ALT  18  /  AlkPhos  36<L>  10-12                Assessment/Plan  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 Patient admitted for hypertensive urgency, Afib and PNA.     #Chest pain  - EKG ordered (personal read) - SR 1st degree block, LVH, prolonged QT; Follow up official read  - Follow up cardiac enzymes  - Tylenol PRN administered  - Cardio following, f/u recs    #Hypertension  - /76; Repeat BP following morning antihypertensive meds 1 hour later: 176/72  - F/u CE's as above

## 2021-10-13 NOTE — PROGRESS NOTE ADULT - SUBJECTIVE AND OBJECTIVE BOX
All interim records and events noted.    asleep in chair, easily arousable, denies pain or SOB      MEDICATIONS  (STANDING):  amLODIPine   Tablet 10 milliGRAM(s) Oral daily  amoxicillin  500 milliGRAM(s)/clavulanate 1 Tablet(s) Oral two times a day  apixaban 5 milliGRAM(s) Oral every 12 hours  ARIPiprazole 30 milliGRAM(s) Oral daily  aspirin enteric coated 81 milliGRAM(s) Oral daily  budesonide 160 MICROgram(s)/formoterol 4.5 MICROgram(s) Inhaler 2 Puff(s) Inhalation two times a day  cyanocobalamin 1000 MICROGram(s) Oral daily  dextrose 40% Gel 15 Gram(s) Oral once  dextrose 5%. 1000 milliLiter(s) (50 mL/Hr) IV Continuous <Continuous>  dextrose 50% Injectable 25 Gram(s) IV Push once  doxazosin 8 milliGRAM(s) Oral at bedtime  famotidine    Tablet 20 milliGRAM(s) Oral every 48 hours  folic acid 1 milliGRAM(s) Oral daily  glucagon  Injectable 1 milliGRAM(s) IntraMuscular once  hydrALAZINE 100 milliGRAM(s) Oral every 8 hours  insulin lispro (ADMELOG) corrective regimen sliding scale   SubCutaneous three times a day before meals  lamoTRIgine 100 milliGRAM(s) Oral daily  mirtazapine 30 milliGRAM(s) Oral at bedtime  nystatin Powder 1 Application(s) Topical two times a day  oxybutynin 5 milliGRAM(s) Oral four times a day  pantoprazole    Tablet 40 milliGRAM(s) Oral before breakfast  venlafaxine XR. 150 milliGRAM(s) Oral daily    MEDICATIONS  (PRN):  acetaminophen   Tablet .. 650 milliGRAM(s) Oral every 6 hours PRN Temp greater or equal to 38C (100.4F), Mild Pain (1 - 3)  melatonin 3 milliGRAM(s) Oral at bedtime PRN Insomnia  ondansetron Injectable 4 milliGRAM(s) IV Push every 8 hours PRN Nausea and/or Vomiting  sodium chloride 0.65% Nasal 1 Spray(s) Both Nostrils two times a day PRN Nasal Congestion      Vital Signs Last 24 Hrs  T(C): 36.7 (13 Oct 2021 09:11), Max: 36.7 (12 Oct 2021 11:55)  T(F): 98 (13 Oct 2021 09:11), Max: 98.1 (12 Oct 2021 11:55)  HR: 77 (13 Oct 2021 09:11) (69 - 77)  BP: 157/69 (13 Oct 2021 09:11) (156/74 - 197/76)  BP(mean): --  RR: 15 (13 Oct 2021 09:11) (15 - 18)  SpO2: 92% (13 Oct 2021 09:11) (91% - 93%)    PHYSICAL EXAM  General: well developed  well nourished, in no acute distress  Head: atraumatic, normocephalic  Neck: supple, trachea midline  CV: S1 S2, regular rate and rhythm  Lungs: clear to auscultation, no wheezes/rhonchi  Abdomen: soft, nontender, bowel sounds present, no palpable masses. Pouchy  Extrem: no clubbing/cyanosis/edema  Skin: no significant increased ecchymosis/petechiae  Neuro: alert and responsive,  no focal deficits      LABS:             8.3    7.53  )-----------( 269      ( 10-13 @ 06:58 )             28.0                8.0    5.77  )-----------( 248      ( 10-12 @ 07:27 )             26.5                8.7    7.04  )-----------( 250      ( 10-11 @ 06:48 )             28.8       10-13    139  |  108  |  35<H>  ----------------------------<  150<H>  3.7   |  23  |  2.90<H>    Ca    8.7      13 Oct 2021 07:01    TPro  6.9  /  Alb  2.9<L>  /  TBili  0.3  /  DBili  x   /  AST  14<L>  /  ALT  20  /  AlkPhos  29<L>  10-13        RADIOLOGY & ADDITIONAL STUDIES:    IMPRESSION/RECOMMENDATIONS:

## 2021-10-13 NOTE — PROGRESS NOTE ADULT - PROBLEM SELECTOR PLAN 9
- Elevated troponin in setting of hypertensive urgency  - trops trended to peak - ACS RULED OUT  - On ASA continue   - Will d/c fenofibrate in the setting of worsening renal function   - Monitor and replete lytes, keep K>4, Mg>2. - overnight complained of chest pain   - 1st set of cardiac enzymes negative, 2nd set ordered- f/u results  - On ASA continue   - Will d/c fenofibrate in the setting of worsening renal function   - Monitor and replete lytes, keep K>4, Mg>2. - overnight complained of chest pain   - 1st set of cardiac enzymes negative, 2nd- 0.053, ordered 3rd set- f/u results   - On ASA continue   - Will d/c fenofibrate in the setting of worsening renal function   - Monitor and replete lytes, keep K>4, Mg>2.

## 2021-10-13 NOTE — PROGRESS NOTE ADULT - PROBLEM SELECTOR PLAN 2
On admission /100 --> BP elevated but is improving 2/2 NON Compliance likely.  - Of note patient recently admitted with hypertensive urgency on 07/2021  - d/c labetalol 100 mg BID in the setting of Bradycardia &  worsening renal function. as per Renal ARIELLA 2/2 tight control of BP   - On clonidine 0.2 TID now clonidine 0.2 BID in the setting of Bradycardia & worsening renal function, as per Renal ARIELLA 2/2 tight control of BP .   - will continue hydralazine 100 mg q 8 hrs + amlodipine 10 mg qd   - Will continue with home medications with hold parameters   - Continue to monitor BP. On admission /100 --> BP elevated but is improving 2/2 NON Compliance likely.  - Of note patient recently admitted with hypertensive urgency on 07/2021  - d/c labetalol 100 mg BID in the setting of Bradycardia &  worsening renal function. as per Renal ARIELLA 2/2 tight control of BP   - On clonidine 0.1 TID in the setting of Bradycardia & worsening renal function, as per Renal ARIELLA 2/2 tight control of BP  - will continue hydralazine 100 mg q 8 hrs + amlodipine 10 mg qd   - Will continue with home medications with hold parameters   - Continue to monitor BP. On admission /100 --> BP elevated but is improving 2/2 NON Compliance likely.  - Of note patient recently admitted with hypertensive urgency on 07/2021  - STOP labetalol 100 mg BID in the setting of Bradycardia &  worsening renal function. as per Renal ARIELLA 2/2 tight control of BP   - Change dose of  clonidine 0.1 TID in the setting of Bradycardia & worsening renal function, as per Renal ARIELLA 2/2 tight control of BP  - On hydralazine 100 mg q 8 hrs + amlodipine 10 mg qd   -Restart Lasix 20 mg daily as d/w Dr Rodarte   - Continue to monitor BP.

## 2021-10-13 NOTE — PROGRESS NOTE ADULT - ATTENDING COMMENTS
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 Patient admitted for hypertensive urgency, Afib and PNA.   pt seen, examined case & care plan d/w pt, residents at detail.  AM labs   PO diet   Cardio DR Gamboa D/W, -change Clonidine 0.1 mg 3x day , CE/tropo in AM   -AS per Dr Rodarte -OK to restart Lasix 20 mg daily.  -PT---> HCPT with home d/c  -D/W Social work -D/C tomorrow in AM if stable.  -Total care time 45 minutes.

## 2021-10-13 NOTE — PROGRESS NOTE ADULT - PROBLEM SELECTOR PLAN 6
H/H 10.1/32 on admission, baseline hemoglobin 8.0-9.0  - Currently hemodynamically stable, likely related to CKD   - Iron studies on 07/21 demonstrated iron deficiency anemia   - F/u AM CBC, Follow w /up R/O -Plasma cell disorder  -C3, C4, dsDNA, albumin/globulin ratio, quantitative IgG WNL.  -D/W Dr Rob. H/H 10.1/32 on admission, baseline hemoglobin 8.0-9.0  - Currently hemodynamically stable, likely related to CKD   - Iron studies on 07/21 demonstrated iron deficiency anemia   - F/u AM CBC, Follow w /up R/O -Plasma cell disorder  - serum immunofixation with weak IgG-kappa, normal IgG/A/M, both kappa and lambda elevated and k/l ratio sl elevated. Findings more c/w MGUS or reactive, can be monitored serially as outpatient.  - Dr Rob following- reccs appreciated above

## 2021-10-13 NOTE — PROGRESS NOTE ADULT - ASSESSMENT
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 Patient admitted for hypertensive urgency, Afib and PNA.  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 Patient admitted for hypertensive urgency, Afib and PNA.

## 2021-10-13 NOTE — PROGRESS NOTE ADULT - ASSESSMENT
ARIELLA, CKD 4  Diabetes  Pneumonia  Hypertension  SLE  Hypokalemia  Anemia    Renal indices stable but not at baseline. ? New tammy. PRN Potassium supplementation. To continue current meds. Monitor blood sugar levels. Insulin coverage as needed.   Monitor h/h trend. Monitor BP trend. Titrate BP meds as needed. Salt restriction. Retacrit dose. Will follow electrolytes and renal function trend. D/c planning.   D/w patient regarding need for out patient nephrology follow up.

## 2021-10-13 NOTE — PROGRESS NOTE ADULT - PROBLEM SELECTOR PLAN 10
- Chronic   - On Mirtazapine, venlafaxin, Lamictal, will continue. - Chronic   - On Mirtazapine, venlafaxin, Lamictal, will continue.  Dr Winkler saw pt , stable for d/c home , pt has capacity to make medical decisions

## 2021-10-13 NOTE — PROGRESS NOTE ADULT - PROBLEM SELECTOR PLAN 11
History of T2DM   - last A1C on 07/21 6.1  - Off of meds   - Insulin sliding scale  - Accu checks w/ hypoglycemic protocol.

## 2021-10-13 NOTE — PROGRESS NOTE ADULT - PROBLEM SELECTOR PLAN 3
Afib   - Patient presents with new onset Afib, however pt's PCP prescribed digoxin, Eliquis by PCP on sept 14/21  - EKG on admission showed Afib @ 64 bpm, incompleta right BBB, LVH   - overnight per remote tele patient demonstrated signs of first degree heart block confirmed by EKG although pt was asymptomatic throughout  - Last TTE on 7/21/21 demonstrate normal left ventricular systolic function, estimated LVEF of 60%.LV diastolic dysfunction.  - On , Eliquis will continue, STOP digoxin- level 0.6  - On labetalol- will hold   - Will continue with Eliquis 5mg BID   - BMP in am  - Continuous Tele   - Cardiology Dr Gamboa called by ER, f/u daily recs- Bradycardia. Afib   - Patient presents with new onset Afib, however pt's PCP prescribed digoxin, Eliquis by PCP on sept 14/21  - EKG on admission showed Afib @ 64 bpm, incompleta right BBB, LVH  - overnight per remote tele patient demonstrated signs of first degree heart block confirmed by EKG although pt was asymptomatic throughout  - Last TTE on 7/21/21 demonstrate normal left ventricular systolic function, estimated LVEF of 60%.LV diastolic dysfunction. Repeat echo performed- f/u results.   - On , Eliquis will continue, STOP digoxin- level 0.6  - On labetalol- will hold   - Will continue with Eliquis 5mg BID   - BMP in am  - Continuous Tele   - Cardiology Dr Gamboa called by ER, f/u daily recs- Bradycardia. Afib   - P , Afib,- pt's PCP prescribed digoxin, Eliquis by PCP on sept 14/21  - EKG on admission showed Afib @ 64 bpm, incompleta right BBB, LVH  - overnight per remote tele patient demonstrated signs of first degree heart block confirmed by EKG although pt was asymptomatic throughout  - Last TTE on 7/21/21 demonstrate normal left ventricular systolic function, estimated LVEF of 60%.LV diastolic dysfunction. Repeat echo performed- f/u results.   - STOP digoxin- level 0.6, s/p Bradycardia & Pauses on Tele   - STOP  labetalol- as Bradycardia & Pauses   - continue with Eliquis 5mg BID   - BMP in am  - Continuous Tele   - Cardiology Dr Gamboa called by ER, f/u daily recs- Bradycardia.

## 2021-10-14 LAB
CK MB BLD-MCNC: 2.8 % — SIGNIFICANT CHANGE UP (ref 0–3.5)
CK MB CFR SERPL CALC: 1.3 NG/ML — SIGNIFICANT CHANGE UP (ref 0–3.6)
CK SERPL-CCNC: 46 U/L — SIGNIFICANT CHANGE UP (ref 26–308)
TROPONIN I SERPL-MCNC: 0.06 NG/ML — HIGH (ref 0.01–0.04)

## 2021-10-14 RX ORDER — DOXAZOSIN MESYLATE 4 MG
1 TABLET ORAL
Qty: 30 | Refills: 0
Start: 2021-10-14 | End: 2021-11-12

## 2021-10-14 RX ORDER — ISOSORBIDE MONONITRATE 60 MG/1
1 TABLET, EXTENDED RELEASE ORAL
Qty: 0 | Refills: 0 | DISCHARGE
Start: 2021-10-14

## 2021-10-14 RX ORDER — NIFEDIPINE 30 MG
1 TABLET, EXTENDED RELEASE 24 HR ORAL
Qty: 30 | Refills: 0
Start: 2021-10-14 | End: 2021-11-12

## 2021-10-14 RX ORDER — LABETALOL HCL 100 MG
1 TABLET ORAL
Qty: 0 | Refills: 0 | DISCHARGE

## 2021-10-14 RX ORDER — DIGOXIN 250 MCG
1 TABLET ORAL
Qty: 0 | Refills: 0 | DISCHARGE

## 2021-10-14 RX ORDER — ISOSORBIDE MONONITRATE 60 MG/1
1 TABLET, EXTENDED RELEASE ORAL
Qty: 30 | Refills: 0
Start: 2021-10-14 | End: 2021-11-12

## 2021-10-14 RX ORDER — HYDRALAZINE HCL 50 MG
1 TABLET ORAL
Qty: 90 | Refills: 0
Start: 2021-10-14 | End: 2021-11-12

## 2021-10-14 RX ORDER — FUROSEMIDE 40 MG
1 TABLET ORAL
Qty: 0 | Refills: 0 | DISCHARGE
Start: 2021-10-14

## 2021-10-14 RX ADMIN — LAMOTRIGINE 100 MILLIGRAM(S): 25 TABLET, ORALLY DISINTEGRATING ORAL at 12:24

## 2021-10-14 RX ADMIN — PREGABALIN 1000 MICROGRAM(S): 225 CAPSULE ORAL at 12:24

## 2021-10-14 RX ADMIN — Medication 150 MILLIGRAM(S): at 12:24

## 2021-10-14 RX ADMIN — Medication 1 TABLET(S): at 05:19

## 2021-10-14 RX ADMIN — Medication 81 MILLIGRAM(S): at 12:24

## 2021-10-14 RX ADMIN — Medication 100 MILLIGRAM(S): at 05:19

## 2021-10-14 RX ADMIN — Medication 0.1 MILLIGRAM(S): at 21:18

## 2021-10-14 RX ADMIN — Medication 5 MILLIGRAM(S): at 18:16

## 2021-10-14 RX ADMIN — ARIPIPRAZOLE 30 MILLIGRAM(S): 15 TABLET ORAL at 12:24

## 2021-10-14 RX ADMIN — Medication 8 MILLIGRAM(S): at 21:18

## 2021-10-14 RX ADMIN — PANTOPRAZOLE SODIUM 40 MILLIGRAM(S): 20 TABLET, DELAYED RELEASE ORAL at 05:19

## 2021-10-14 RX ADMIN — ISOSORBIDE MONONITRATE 30 MILLIGRAM(S): 60 TABLET, EXTENDED RELEASE ORAL at 12:24

## 2021-10-14 RX ADMIN — BUDESONIDE AND FORMOTEROL FUMARATE DIHYDRATE 2 PUFF(S): 160; 4.5 AEROSOL RESPIRATORY (INHALATION) at 19:53

## 2021-10-14 RX ADMIN — MIRTAZAPINE 30 MILLIGRAM(S): 45 TABLET, ORALLY DISINTEGRATING ORAL at 21:18

## 2021-10-14 RX ADMIN — Medication 0.1 MILLIGRAM(S): at 05:19

## 2021-10-14 RX ADMIN — Medication 100 MILLIGRAM(S): at 13:05

## 2021-10-14 RX ADMIN — APIXABAN 5 MILLIGRAM(S): 2.5 TABLET, FILM COATED ORAL at 18:16

## 2021-10-14 RX ADMIN — Medication 0.1 MILLIGRAM(S): at 13:05

## 2021-10-14 RX ADMIN — Medication 100 MILLIGRAM(S): at 21:18

## 2021-10-14 RX ADMIN — Medication 5 MILLIGRAM(S): at 21:18

## 2021-10-14 RX ADMIN — BUDESONIDE AND FORMOTEROL FUMARATE DIHYDRATE 2 PUFF(S): 160; 4.5 AEROSOL RESPIRATORY (INHALATION) at 05:20

## 2021-10-14 RX ADMIN — Medication 5 MILLIGRAM(S): at 12:24

## 2021-10-14 RX ADMIN — Medication 1 MILLIGRAM(S): at 12:24

## 2021-10-14 RX ADMIN — NYSTATIN CREAM 1 APPLICATION(S): 100000 CREAM TOPICAL at 05:21

## 2021-10-14 RX ADMIN — APIXABAN 5 MILLIGRAM(S): 2.5 TABLET, FILM COATED ORAL at 05:19

## 2021-10-14 RX ADMIN — Medication 5 MILLIGRAM(S): at 05:19

## 2021-10-14 RX ADMIN — AMLODIPINE BESYLATE 10 MILLIGRAM(S): 2.5 TABLET ORAL at 05:19

## 2021-10-14 RX ADMIN — ONDANSETRON 4 MILLIGRAM(S): 8 TABLET, FILM COATED ORAL at 20:37

## 2021-10-14 NOTE — PROGRESS NOTE ADULT - ASSESSMENT
[ASSESSMENT and  PLAN]  75yo M admitted with HTN urgency,  /100 and multilobar pneumonia; history of GI bleed with diverticulosis; also noted to have renal insufficiency  Empirically started on oral B12 and folate; but levels replete and stopped  Iron studies c/w iron deficiency; started on IV iron therapy  Remains on  Apixiban 5mg q12h for Afib    - serum immunofixation with weak IgG-kappa, normal IgG/A/M, both kappa and lambda elevated and k/l ratio sl elevated. Findings more c/w MGUS or reactive, can be monitored serially as outpatient  - Hgb sl lower but essentially stable, no signs of active bleeds, continue serial monitor  to evaluate CBC in am.  ?may give 1 unit PRBC prior to discharge  - symptomatic management from Heme standpoint

## 2021-10-14 NOTE — PROGRESS NOTE ADULT - PROBLEM SELECTOR PLAN 2
On admission /100 --> BP elevated but is improving 2/2 NON Compliance likely.  - Of note patient recently admitted with hypertensive urgency on 07/2021  - STOP labetalol 100 mg BID in the setting of Bradycardia &  worsening renal function. as per Renal ARIELLA 2/2 tight control of BP   - Change dose of  clonidine 0.1 TID in the setting of Bradycardia & worsening renal function, as per Renal ARIELLA 2/2 tight control of BP  - On hydralazine 100 mg q 8 hrs + amlodipine 10 mg qd   -Restart Lasix 20 mg daily as d/w Dr Rodarte   - Continue to monitor BP. On admission /100 --> BP elevated but is improving 2/2 NON Compliance likely.  - Of note patient recently admitted with hypertensive urgency on 07/2021  - STOP labetalol 100 mg BID in the setting of Bradycardia &  worsening renal function. as per Renal ARIELLA 2/2 tight control of BP   - Change dose of clonidine 0.1 TID in the setting of Bradycardia & worsening renal function, as per Renal ARIELLA 2/2 tight control of BP  - On hydralazine 100 mg q 8 hrs + amlodipine 10 mg qd   - Started on Procardia 30mg and Imdur 30mg per Dr. Gamboa  -Restart Lasix 20 mg daily as d/w Dr Rodarte   - Continue to monitor BP. On admission /100 --> BP elevated but is improving 2/2 NON Compliance likely.  - Of note patient recently admitted with hypertensive urgency on 07/2021  - STOP labetalol 100 mg BID in the setting of Bradycardia &  worsening renal function. as per Renal ARIELLA 2/2 tight control of BP   - Change dose of clonidine 0.1 TID in the setting of Bradycardia & worsening renal function, as per Renal ARIELLA 2/2 tight control of BP  - On hydralazine 100 mg q 8 hrs + amlodipine 10 mg qd   - Started on Procardia XL 30mg and Imdur 30mg per Dr. Gamboa  -Restart Lasix 20 mg daily as d/w Dr Rodarte   - Continue to monitor BP.

## 2021-10-14 NOTE — PROGRESS NOTE ADULT - PROBLEM SELECTOR PLAN 8
Abnormal CT finding   -CT chest on admission showed Multiple heterogeneous thyroid nodules noted bilaterally  - TSH- normal- Recommended to follow up outpatient.  -Endo-Dr C perlman eval done.  · -Recommendation: biochemically euthyroid-thyroid US. as above

## 2021-10-14 NOTE — PROGRESS NOTE ADULT - ASSESSMENT
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 Patient admitted for hypertensive urgency, Afib and PNA.

## 2021-10-14 NOTE — PROGRESS NOTE ADULT - PROBLEM SELECTOR PLAN 10
- Chronic   - On Mirtazapine, venlafaxin, Lamictal, will continue.  Dr Winkler saw pt , stable for d/c home , pt has capacity to make medical decisions

## 2021-10-14 NOTE — PROGRESS NOTE ADULT - SUBJECTIVE AND OBJECTIVE BOX
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 Pt states he would intermittently feel chills and feverish, recorded no temps at group home since last couple of days, he states progressively worsening of dyspnea on exertion and rest, patient unable to walk short distances or climb stairs due to dyspnea, however denies orthopnea, cough, sputum production, night sweats. Patient mentioned some dizziness associated with lower extremities weakness, usually ambulates independently but now feels unable to hold his weight. Regarding palpitations, pt states for 3 days his heart would skip a beat, return to normal. Self limited episodes under a minute, no associated chest pain. Pt also describes urinary frequency beyond baseline, denies dysuria or incontinence, hematuria, constipation.     ED course:   VS: Afebrile, HR: 80 BP: 220/100->235/116-> 189/85, Spo2: 98 on NC RR: 20  Labs significant for low stable hemoglobin, BUN/creatinine: 36/2.30, Trop 0.149, Pro BMP 8061.   EKG on admission showed Afib @ 64 bpm, incompleta right BBB, LVH  CT head shows no  evidence of acute intracranial hemorrhage, midline shift or CT evidence of acute territorial infarct.  CT chest A/P demonstrated Multilobar pneumonia. Mild cardiomegaly. No mall bowel obstruction or active bowel inflammation.  Patient was given in the ED 10 mg IVP hydralazine 10 mg Labetalol IVP 1x, 2000 cc bolus NS 1x            (07 Oct 2021 01:24)    INTERVAL HPI:  10/07/21: Pt seen and examined at bedside; in no acute distress; complains of intermittent palpitation and transient chest pain. On Zosyn 3.375gm q8h + azithromycin 500mg daily; concern for medication compliance; ??capacity. will consult psych. K replaced  10/8/21: Pt seen and examined at bedside. Pt is lethargic and is sleeping although answers with yes and no questions. He has no acute complaints. Denies fevers, chills, chest pain, SOB, abdominal pain, n/v/d/c. On Zosyn 3.375gm q8h + azithromycin 500mg daily. Blood pressure improving. PT recommended LETI yesterday.  10/9/21: Pt seen and examined at bedside. Pt with no complaints this am. Worsening Cr function this am; Nephro on board. Will continue to monitor.   10/10/21: Pt seen and examined at bedside with no complaints this am; On renally dosed zosyn; Add Zithromax, improving renal function this am; pending psych consultation with Dr Winkler.  10/11/21: Patient was seen and examined at bedside. Patient did not want to provide history and wanted to continue sleeping. Patient states he is very sleepy and wants rest. On IV Zosyn /Po Zithromax   10/12/21: Patient was seen and examined at bedside. Switched from IV Zosyn to Augmentin BID x2 days , Replaced K , PT---> HCPT  10/13/21: Patient was seen and examined at bedside. Patient was very drowsy and not able to hold meaningful conversation. Stated he does not have chest pain anymore and feels comfortable. Repeat echo performed, f/u results. 1st set of cardiac enzymes negative. 2nd set of cardiac enzymes - 0.053, 3rd set ordered- Dr. Gamboa aware , follow CK/Tropo at 10 PM & in AM ,   10/14/21: Patient was seen and examined at bedside. States he has back pain because of the hospital bed being uncomfortable and has a slight headache because he wasnt able to sleep well during the night. Patient states he wants to be discharged as soon as possible.     OVERNIGHT EVENTS: No overnight events    Home Medications:  cloNIDine 0.1 mg oral tablet: 2 tab(s) orally 3 times a day (07 Oct 2021 03:41)  digoxin 125 mcg (0.125 mg) oral tablet: 1 tab(s) orally once a day (07 Oct 2021 03:41)  Eliquis 5 mg oral tablet: 1 tab(s) orally 2 times a day (07 Oct 2021 03:41)  famotidine 40 mg oral tablet: 1 tab(s) orally once a day (at bedtime) (07 Oct 2021 03:41)  fenofibrate 145 mg oral tablet: 1 tab(s) orally once a day (07 Oct 2021 03:41)  labetalol 100 mg oral tablet: 1 tab(s) orally 2 times a day (07 Oct 2021 03:41)  Lasix 20 mg oral tablet: 1 tab(s) orally 2 times a day (07 Oct 2021 03:41)  oxybutynin 5 mg oral tablet: 1 tab(s) orally 4 times a day (07 Oct 2021 03:41)  pantoprazole 40 mg oral delayed release tablet: 1 tab(s) orally once a day (before a meal) (07 Oct 2021 03:41)  Symbicort 160 mcg-4.5 mcg/inh inhalation aerosol: 2 puff(s) inhaled 2 times a day (07 Oct 2021 03:41)      MEDICATIONS  (STANDING):  amLODIPine   Tablet 10 milliGRAM(s) Oral daily  apixaban 5 milliGRAM(s) Oral every 12 hours  ARIPiprazole 30 milliGRAM(s) Oral daily  aspirin enteric coated 81 milliGRAM(s) Oral daily  budesonide 160 MICROgram(s)/formoterol 4.5 MICROgram(s) Inhaler 2 Puff(s) Inhalation two times a day  cloNIDine 0.1 milliGRAM(s) Oral three times a day  cyanocobalamin 1000 MICROGram(s) Oral daily  dextrose 40% Gel 15 Gram(s) Oral once  dextrose 5%. 1000 milliLiter(s) (50 mL/Hr) IV Continuous <Continuous>  dextrose 50% Injectable 25 Gram(s) IV Push once  doxazosin 8 milliGRAM(s) Oral at bedtime  famotidine    Tablet 20 milliGRAM(s) Oral every 48 hours  folic acid 1 milliGRAM(s) Oral daily  furosemide    Tablet 20 milliGRAM(s) Oral daily  glucagon  Injectable 1 milliGRAM(s) IntraMuscular once  hydrALAZINE 100 milliGRAM(s) Oral every 8 hours  insulin lispro (ADMELOG) corrective regimen sliding scale   SubCutaneous three times a day before meals  isosorbide   mononitrate ER Tablet (IMDUR) 30 milliGRAM(s) Oral daily  lamoTRIgine 100 milliGRAM(s) Oral daily  mirtazapine 30 milliGRAM(s) Oral at bedtime  NIFEdipine XL 30 milliGRAM(s) Oral daily  nystatin Powder 1 Application(s) Topical two times a day  oxybutynin 5 milliGRAM(s) Oral four times a day  pantoprazole    Tablet 40 milliGRAM(s) Oral before breakfast  venlafaxine XR. 150 milliGRAM(s) Oral daily    MEDICATIONS  (PRN):  acetaminophen   Tablet .. 650 milliGRAM(s) Oral every 6 hours PRN Temp greater or equal to 38C (100.4F), Mild Pain (1 - 3)  melatonin 3 milliGRAM(s) Oral at bedtime PRN Insomnia  ondansetron Injectable 4 milliGRAM(s) IV Push every 8 hours PRN Nausea and/or Vomiting  sodium chloride 0.65% Nasal 1 Spray(s) Both Nostrils two times a day PRN Nasal Congestion      Allergies    No Known Allergies    Intolerances        Social History:  Tobacco: quit in 1980, not active smoker  EtOH: denies   Recreational drug use: cocaine several years ago "a few times" has not used in "many years"  [facility]: [facility]; group home; no nursing assistance; observation is there  Ambulates: independent, denies walker or cane but uses guard railings  ADLs: independent, but states need for assistance in bathing, cooking; independent with feeding, ambulating  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 No nausea, No vomiting; No diarrhea, No constipation. [  ] BM  GENITOURINARY: No dysuria, No frequency, No urgency, No hematuria, NO incontinence  NEUROLOGICAL: (x) headaches, No dizziness, No numbness, No tingling, No tremors, No weakness  EXT: No Swelling, No Pain, No Edema  SKIN:  [x  ] No itching, burning, rashes, or lesions   MUSCULOSKELETAL: No joint pain ,No Jt swelling; No muscle pain, (+) back pain, No extremity pain  PSYCHIATRIC: No depression,  No anxiety,  No mood swings ,No difficulty sleeping at night  PAIN SCALE: [x  ] None  [  ] Other-  ROS Unable to obtain due to - [  ] Dementia  [  ] Lethargy [  ] Drowsy [  ] Sedated [  ] non verbal  REST OF REVIEW Of SYSTEM - [ x ] Normal     Vital Signs Last 24 Hrs  T(C): 36.7 (14 Oct 2021 05:27), Max: 36.7 (13 Oct 2021 11:37)  T(F): 98.1 (14 Oct 2021 05:27), Max: 98.1 (14 Oct 2021 05:27)  HR: 89 (14 Oct 2021 05:27) (67 - 89)  BP: 167/76 (14 Oct 2021 05:27) (166/81 - 182/86)  BP(mean): --  RR: 18 (14 Oct 2021 05:27) (15 - 18)  SpO2: 94% (14 Oct 2021 05:27) (94% - 96%)  Finger Stick        10-13 @ 07:01  -  10-14 @ 07:00  --------------------------------------------------------  IN: 300 mL / OUT: 0 mL / NET: 300 mL        PHYSICAL EXAM:  GENERAL:  [ x ] NAD , [x  ] well appearing, [  ] Agitated, [  ] Drowsy,  [  ] Lethargy, [  ] confused   HEAD:  [ x ] Normal, [  ] Other  EYES:  [x  ] EOMI, [  ] PERRLA, [ x ] conjunctiva and sclera clear normal, [  ] Other,  [  ] Pallor,[  ] Discharge  ENMT:  [ x ] Normal, [  ] Moist mucous membranes, [  ] Good dentition, [  ] No Thrush  NECK:  [ x ] Supple, [ x ] No JVD, [  ] Normal thyroid, [  ] Lymphadenopathy [  ] Other  CHEST/LUNG:  x[  ] Clear to auscultation bilaterally, [ x ] Breath Sounds equal B/L [  ] poor effort  [ x ] No rales, [ x ] No rhonchi  [ x ]  No wheezing,   HEART:  [x  ] Regular rate and rhythm, [  ] tachycardia, [  ] Bradycardia,  [  ] irregular  [x ] No murmurs, No rubs, No gallops, [  ] PPM in place (Mfr:  )  ABDOMEN:  [x  ] Soft, [ x ] Nontender, [x  ] Nondistended, [  ] No mass, [  ] Bowel sounds present, [  ] obese  NERVOUS SYSTEM:  [ x ] Alert & Oriented X3, [  ] Nonfocal  [  ] Confusion  [  ] Encephalopathic [  ] Sedated [  ] Unable to assess, [  ] Dementia [  ] Other-  EXTREMITIES: [ x ] 2+ Peripheral Pulses, No clubbing, No cyanosis,  [  ] edema B/L lower EXT. [  ] PVD stasis skin changes B/L Lower EXT, [  ] wound  LYMPH: No lymphadenopathy noted  SKIN:  [ x ] No rashes or lesions, [  ] Pressure Ulcers, [  ] ecchymosis, [  ] Skin Tears, [  ] Other    DIET: Diet, Consistent Carbohydrate Renal w/Evening Snack:   DASH/TLC Sodium & Cholesterol Restricted (10-07-21 @ 02:46)      LABS:      Ca    8.7        13 Oct 2021 07:01          CARDIAC MARKERS ( 14 Oct 2021 00:59 )  .055 ng/mL / x     / 46 U/L / x     / 1.3 ng/mL  CARDIAC MARKERS ( 13 Oct 2021 19:03 )  .064 ng/mL / x     / 51 U/L / x     / 1.6 ng/mL  CARDIAC MARKERS ( 13 Oct 2021 13:51 )  .063 ng/mL / x     / 51 U/L / x     / 1.2 ng/mL  CARDIAC MARKERS ( 13 Oct 2021 10:41 )  .053 ng/mL / x     / 46 U/L / x     / 1.2 ng/mL  CARDIAC MARKERS ( 13 Oct 2021 07:01 )  .035 ng/mL / x     / 51 U/L / x     / 1.2 ng/mL  CARDIAC MARKERS ( 07 Oct 2021 14:20 )  .112 ng/mL / x     / x     / x     / x                 Anemia Panel:  Iron Total, Serum: 28 ug/dL (10-08-21 @ 22:27)  Iron - Total Binding Capacity.: 337 ug/dL (10-08-21 @ 22:27)  Vitamin B12, Serum: 413 pg/mL (10-08-21 @ 22:26)  Folate, Serum: 9.9 ng/mL (10-08-21 @ 22:26)  Ferritin, Serum: 28 ng/mL (10-08-21 @ 22:26)  Absolute Reticulocytes: 49.4 K/uL (10-08-21 @ 16:57)      Thyroid Panel:  Thyroid Stimulating Hormone, Serum: 0.89 uIU/mL (10-12-21 @ 07:27)            Immunofixation, Serum:   Weak IgG Kappa Band Identified    Reference Range: None Detected (10-08-21 @ 23:37)  Serum Protein Electrophoresis Interp: Weak Gamma-Migrating Paraprotein Identified (10-08-21 @ 23:37)      RADIOLOGY & ADDITIONAL TESTS:  < from: US Thyroid (10.12.21 @ 10:33) >  EXAM:  US THYROID ONLY                            PROCEDURE DATE:  10/12/2021          INTERPRETATION:  CLINICAL INFORMATION: Thyroid nodules.    COMPARISON: None available.    TECHNIQUE: Sonography of the thyroid.    FINDINGS:  Right Lobe: 2.9 cm x  1.4 cm x 2.0 cm. Heterogeneous in echotexture.    Left Lobe: 4.2 cm x 1.9 cm x 2.2 cm. Multiple nodules. For example:  *  Isoechoic nodule in the midpole, measuring 1.0 x 0.7 x 0.7 cm (TI-RAD 3)  *  Upper pole cyst, measuring 0.5 x 0.3 x 0.3 cm  *Solid hypoechoic nodule in the upper pole, measuring 0.6 x 0.4 x 0.7 cm (TI-RAD 4)    Isthmus: 4 mm.    Cervical Lymph Nodes: No enlarged or abnormal morphology cervical nodes.    IMPRESSION:    Multiple left thyroid nodules, measuring 1.0 cm in the mid pole and 0.7 cm in the upper pole (TI-RAD 3 and 4).    TI-RAD 4: Moderately suspicious (FNA if > 1.5 cm, Follow if > 1 cm)  __________________________________  ACR Thyroid Imaging, Reporting and Data System (TI-RADS): White Paper of the ACR TI-RADS Committee. J Am Bharathi Radiol 2017;14:587-595.    TI-RAD 1: Benign (No FNA)  TI-RAD 2: Not suspicious (No FNA)  TI-RAD 3: Mildly suspicious (FNA if > 2.5 cm, Follow if >1.5 cm)  TI-RAD 4: Moderately suspicious (FNA if > 1.5 cm, Follow if > 1 cm)  TI-RAD 5: Highly suspicious (FNA if > 1 cm, Follow if > 0.5 cm)    --- End of Report ---        < end of copied text >      HEALTH ISSUES - PROBLEM Dx:  PNA (pneumonia)    Hypertensive urgency    Chronic atrial fibrillation    Elevated troponin    Stage 3 chronic kidney disease    Anemia of chronic disease    Chronic diastolic congestive heart failure    Abnormal CT scan of lung    CAD (coronary artery disease)    Depression, major    DM (diabetes mellitus), type 2    DVT prophylaxis    Multiple thyroid nodules            Consultant(s) Notes Reviewed:  [ x ] YES     Care Discussed with [X] Consultants  [x  ] Patient  [  ] Family [  ] HCP [  ]   [  ] Social Service  [  ] RN, [  ] Physical Therapy,[  ] Palliative care team  DVT PPX: [  ] Lovenox, [  ] S C Heparin, [  ] Coumadin, [  ] Xarelto, [x  ] Eliquis, [  ] Pradaxa, [  ] IV Heparin drip, [  ] SCD [  ] Contraindication 2 to GI Bleed,[  ] Ambulation [  ] Contraindicated 2 to  bleed [  ] Contraindicated 2 to Brain Bleed  Advanced directive: [x  ] None, [  ] DNR/DNI Patient is a 76y old  Male who presents with a chief complaint of multilobar PNA, afib (13 Oct 2021 22:20)    HPI:  75 yo M with PMHx of Type 2 DM (not on insulin), BPH, CAD s/p stents >4 years ago (not on plavix), diverticulitis (hospitalized 07/2020 at [facility]), GIB 2/2 diverticulosis (2020), anxiety, Lupus, HTN, HLD, CKD stage 3, HepC, 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 Pt states he would intermittently feel chills and feverish, recorded no temps at group home since last couple of days, he states progressively worsening of dyspnea on exertion and rest, patient unable to walk short distances or climb stairs due to dyspnea, however denies orthopnea, cough, sputum production, night sweats. Patient mentioned some dizziness associated with lower extremities weakness, usually ambulates independently but now feels unable to hold his weight. Regarding palpitations, pt states for 3 days his heart would skip a beat, return to normal. Self limited episodes under a minute, no associated chest pain. Pt also describes urinary frequency beyond baseline, denies dysuria or incontinence, hematuria, constipation.     ED course:   VS: Afebrile, HR: 80 BP: 220/100->235/116-> 189/85, Spo2: 98 on NC RR: 20  Labs significant for low stable hemoglobin, BUN/creatinine: 36/2.30, Trop 0.149, Pro BMP 8061.   EKG on admission showed Afib @ 64 bpm, incompleta right BBB, LVH  CT head shows no  evidence of acute intracranial hemorrhage, midline shift or CT evidence of acute territorial infarct.  CT chest A/P demonstrated Multilobar pneumonia. Mild cardiomegaly. No mall bowel obstruction or active bowel inflammation.  Patient was given in the ED 10 mg IVP hydralazine 10 mg Labetalol IVP 1x, 2000 cc bolus NS 1x        INTERVAL HPI:  10/07/21: Pt seen and examined at bedside; in no acute distress; complains of intermittent palpitation and transient chest pain. On Zosyn 3.375gm q8h + azithromycin 500mg daily; concern for medication compliance; ??capacity. will consult psych. K replaced  10/8/21: Pt seen and examined at bedside. Pt is lethargic and is sleeping although answers with yes and no questions. He has no acute complaints. Denies fevers, chills, chest pain, SOB, abdominal pain, n/v/d/c. On Zosyn 3.375gm q8h + azithromycin 500mg daily. Blood pressure improving. PT recommended LETI yesterday.  10/9/21: Pt seen and examined at bedside. Pt with no complaints this am. Worsening Cr function this am; Nephro on board. Will continue to monitor.   10/10/21: Pt seen and examined at bedside with no complaints this am; On renally dosed zosyn; Add Zithromax, improving renal function this am; pending psych consultation with Dr Winkler.  10/11/21: Patient was seen and examined at bedside. Patient did not want to provide history and wanted to continue sleeping. Patient states he is very sleepy and wants rest. On IV Zosyn /Po Zithromax   10/12/21: Patient was seen and examined at bedside. Switched from IV Zosyn to Augmentin BID x2 days , Replaced K , PT---> HCPT  10/13/21: Patient was seen and examined at bedside. Patient was very drowsy and not able to hold meaningful conversation. Stated he does not have chest pain anymore and feels comfortable. Repeat echo performed, f/u results. 1st set of cardiac enzymes negative. 2nd set of cardiac enzymes - 0.053, 3rd set ordered- Dr. Gamboa aware , follow CK/Tropo at 10 PM & in AM ,   10/14/21: Patient was seen and examined at bedside. States he has back pain because of the hospital bed being uncomfortable and has a slight headache because he wasnt able to sleep well during the night. Patient states he wants to be discharged as soon as possible.     OVERNIGHT EVENTS: No overnight events    Home Medications:  cloNIDine 0.1 mg oral tablet: 2 tab(s) orally 3 times a day (07 Oct 2021 03:41)  digoxin 125 mcg (0.125 mg) oral tablet: 1 tab(s) orally once a day (07 Oct 2021 03:41)  Eliquis 5 mg oral tablet: 1 tab(s) orally 2 times a day (07 Oct 2021 03:41)  famotidine 40 mg oral tablet: 1 tab(s) orally once a day (at bedtime) (07 Oct 2021 03:41)  fenofibrate 145 mg oral tablet: 1 tab(s) orally once a day (07 Oct 2021 03:41)  labetalol 100 mg oral tablet: 1 tab(s) orally 2 times a day (07 Oct 2021 03:41)  Lasix 20 mg oral tablet: 1 tab(s) orally 2 times a day (07 Oct 2021 03:41)  oxybutynin 5 mg oral tablet: 1 tab(s) orally 4 times a day (07 Oct 2021 03:41)  pantoprazole 40 mg oral delayed release tablet: 1 tab(s) orally once a day (before a meal) (07 Oct 2021 03:41)  Symbicort 160 mcg-4.5 mcg/inh inhalation aerosol: 2 puff(s) inhaled 2 times a day (07 Oct 2021 03:41)      MEDICATIONS  (STANDING):  amLODIPine   Tablet 10 milliGRAM(s) Oral daily  apixaban 5 milliGRAM(s) Oral every 12 hours  ARIPiprazole 30 milliGRAM(s) Oral daily  aspirin enteric coated 81 milliGRAM(s) Oral daily  budesonide 160 MICROgram(s)/formoterol 4.5 MICROgram(s) Inhaler 2 Puff(s) Inhalation two times a day  cloNIDine 0.1 milliGRAM(s) Oral three times a day  cyanocobalamin 1000 MICROGram(s) Oral daily  dextrose 40% Gel 15 Gram(s) Oral once  dextrose 5%. 1000 milliLiter(s) (50 mL/Hr) IV Continuous <Continuous>  dextrose 50% Injectable 25 Gram(s) IV Push once  doxazosin 8 milliGRAM(s) Oral at bedtime  famotidine    Tablet 20 milliGRAM(s) Oral every 48 hours  folic acid 1 milliGRAM(s) Oral daily  furosemide    Tablet 20 milliGRAM(s) Oral daily  glucagon  Injectable 1 milliGRAM(s) IntraMuscular once  hydrALAZINE 100 milliGRAM(s) Oral every 8 hours  insulin lispro (ADMELOG) corrective regimen sliding scale   SubCutaneous three times a day before meals  isosorbide   mononitrate ER Tablet (IMDUR) 30 milliGRAM(s) Oral daily  lamoTRIgine 100 milliGRAM(s) Oral daily  mirtazapine 30 milliGRAM(s) Oral at bedtime  NIFEdipine XL 30 milliGRAM(s) Oral daily  nystatin Powder 1 Application(s) Topical two times a day  oxybutynin 5 milliGRAM(s) Oral four times a day  pantoprazole    Tablet 40 milliGRAM(s) Oral before breakfast  venlafaxine XR. 150 milliGRAM(s) Oral daily    MEDICATIONS  (PRN):  acetaminophen   Tablet .. 650 milliGRAM(s) Oral every 6 hours PRN Temp greater or equal to 38C (100.4F), Mild Pain (1 - 3)  melatonin 3 milliGRAM(s) Oral at bedtime PRN Insomnia  ondansetron Injectable 4 milliGRAM(s) IV Push every 8 hours PRN Nausea and/or Vomiting  sodium chloride 0.65% Nasal 1 Spray(s) Both Nostrils two times a day PRN Nasal Congestion      Allergies    No Known Allergies    Intolerances        Social History:  Tobacco: quit in 1980, not active smoker  EtOH: denies   Recreational drug use: cocaine several years ago "a few times" has not used in "many years"  Lives with: [facility]; group home; no nursing assistance; observation is there  Ambulates: independent, denies walker or cane but uses guard railings  ADLs: independent, but states need for assistance in bathing, cooking; independent with feeding, ambulating  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 No nausea, No vomiting; No diarrhea, No constipation. [  ] BM  GENITOURINARY: No dysuria, No frequency, No urgency, No hematuria, NO incontinence  NEUROLOGICAL: (x) headaches, No dizziness, No numbness, No tingling, No tremors, No weakness  EXT: No Swelling, No Pain, No Edema  SKIN:  [x  ] No itching, burning, rashes, or lesions   MUSCULOSKELETAL: No joint pain ,No Jt swelling; No muscle pain, (+) back pain, No extremity pain  PSYCHIATRIC: No depression,  No anxiety,  No mood swings ,No difficulty sleeping at night  PAIN SCALE: [x  ] None  [  ] Other-  ROS Unable to obtain due to - [  ] Dementia  [  ] Lethargy [  ] Drowsy [  ] Sedated [  ] non verbal  REST OF REVIEW Of SYSTEM - [ x ] Normal     Vital Signs Last 24 Hrs  T(C): 36.7 (14 Oct 2021 05:27), Max: 36.7 (13 Oct 2021 11:37)  T(F): 98.1 (14 Oct 2021 05:27), Max: 98.1 (14 Oct 2021 05:27)  HR: 89 (14 Oct 2021 05:27) (67 - 89)  BP: 167/76 (14 Oct 2021 05:27) (166/81 - 182/86)  BP(mean): --  RR: 18 (14 Oct 2021 05:27) (15 - 18)  SpO2: 94% (14 Oct 2021 05:27) (94% - 96%)  Finger Stick        10-13 @ 07:01  -  10-14 @ 07:00  --------------------------------------------------------  IN: 300 mL / OUT: 0 mL / NET: 300 mL        PHYSICAL EXAM:  GENERAL:  [ x ] NAD , [x  ] well appearing, [  ] Agitated, [  ] Drowsy,  [  ] Lethargy, [  ] confused   HEAD:  [ x ] Normal, [  ] Other  EYES:  [x  ] EOMI, [  ] PERRLA, [ x ] conjunctiva and sclera clear normal, [  ] Other,  [  ] Pallor,[  ] Discharge  ENMT:  [ x ] Normal, [  ] Moist mucous membranes, [  ] Good dentition, [  ] No Thrush  NECK:  [ x ] Supple, [ x ] No JVD, [  ] Normal thyroid, [  ] Lymphadenopathy [  ] Other  CHEST/LUNG:  x[  ] Clear to auscultation bilaterally, [ x ] Breath Sounds equal B/L [  ] poor effort  [ x ] No rales, [ x ] No rhonchi  [ x ]  No wheezing,   HEART:  [x  ] Regular rate and rhythm, [  ] tachycardia, [  ] Bradycardia,  [  ] irregular  [x ] No murmurs, No rubs, No gallops, [  ] PPM in place (Mfr:  )  ABDOMEN:  [x  ] Soft, [ x ] Nontender, [x  ] Nondistended, [  ] No mass, [  ] Bowel sounds present, [  ] obese  NERVOUS SYSTEM:  [ x ] Alert & Oriented X3, [  ] Nonfocal  [  ] Confusion  [  ] Encephalopathic [  ] Sedated [  ] Unable to assess, [  ] Dementia [  ] Other-  EXTREMITIES: [ x ] 2+ Peripheral Pulses, No clubbing, No cyanosis,  [  ] edema B/L lower EXT. [  ] PVD stasis skin changes B/L Lower EXT, [  ] wound  LYMPH: No lymphadenopathy noted  SKIN:  [ x ] No rashes or lesions, [  ] Pressure Ulcers, [  ] ecchymosis, [  ] Skin Tears, [  ] Other    DIET: Diet, Consistent Carbohydrate Renal w/Evening Snack:   DASH/TLC Sodium & Cholesterol Restricted (10-07-21 @ 02:46)      LABS:      Ca    8.7        13 Oct 2021 07:01          CARDIAC MARKERS ( 14 Oct 2021 00:59 )  .055 ng/mL / x     / 46 U/L / x     / 1.3 ng/mL  CARDIAC MARKERS ( 13 Oct 2021 19:03 )  .064 ng/mL / x     / 51 U/L / x     / 1.6 ng/mL  CARDIAC MARKERS ( 13 Oct 2021 13:51 )  .063 ng/mL / x     / 51 U/L / x     / 1.2 ng/mL  CARDIAC MARKERS ( 13 Oct 2021 10:41 )  .053 ng/mL / x     / 46 U/L / x     / 1.2 ng/mL  CARDIAC MARKERS ( 13 Oct 2021 07:01 )  .035 ng/mL / x     / 51 U/L / x     / 1.2 ng/mL  CARDIAC MARKERS ( 07 Oct 2021 14:20 )  .112 ng/mL / x     / x     / x     / x                 Anemia Panel:  Iron Total, Serum: 28 ug/dL (10-08-21 @ 22:27)  Iron - Total Binding Capacity.: 337 ug/dL (10-08-21 @ 22:27)  Vitamin B12, Serum: 413 pg/mL (10-08-21 @ 22:26)  Folate, Serum: 9.9 ng/mL (10-08-21 @ 22:26)  Ferritin, Serum: 28 ng/mL (10-08-21 @ 22:26)  Absolute Reticulocytes: 49.4 K/uL (10-08-21 @ 16:57)      Thyroid Panel:  Thyroid Stimulating Hormone, Serum: 0.89 uIU/mL (10-12-21 @ 07:27)            Immunofixation, Serum:   Weak IgG Kappa Band Identified    Reference Range: None Detected (10-08-21 @ 23:37)  Serum Protein Electrophoresis Interp: Weak Gamma-Migrating Paraprotein Identified (10-08-21 @ 23:37)      RADIOLOGY & ADDITIONAL TESTS:  < from: US Thyroid (10.12.21 @ 10:33) >  EXAM:  US THYROID ONLY                            PROCEDURE DATE:  10/12/2021          INTERPRETATION:  CLINICAL INFORMATION: Thyroid nodules.    COMPARISON: None available.    TECHNIQUE: Sonography of the thyroid.    FINDINGS:  Right Lobe: 2.9 cm x  1.4 cm x 2.0 cm. Heterogeneous in echotexture.    Left Lobe: 4.2 cm x 1.9 cm x 2.2 cm. Multiple nodules. For example:  *  Isoechoic nodule in the midpole, measuring 1.0 x 0.7 x 0.7 cm (TI-RAD 3)  *  Upper pole cyst, measuring 0.5 x 0.3 x 0.3 cm  *Solid hypoechoic nodule in the upper pole, measuring 0.6 x 0.4 x 0.7 cm (TI-RAD 4)    Isthmus: 4 mm.    Cervical Lymph Nodes: No enlarged or abnormal morphology cervical nodes.    IMPRESSION:    Multiple left thyroid nodules, measuring 1.0 cm in the mid pole and 0.7 cm in the upper pole (TI-RAD 3 and 4).    TI-RAD 4: Moderately suspicious (FNA if > 1.5 cm, Follow if > 1 cm)  __________________________________  ACR Thyroid Imaging, Reporting and Data System (TI-RADS): White Paper of the ACR TI-RADS Committee. J Am Bharathi Radiol 2017;14:587-595.    TI-RAD 1: Benign (No FNA)  TI-RAD 2: Not suspicious (No FNA)  TI-RAD 3: Mildly suspicious (FNA if > 2.5 cm, Follow if >1.5 cm)  TI-RAD 4: Moderately suspicious (FNA if > 1.5 cm, Follow if > 1 cm)  TI-RAD 5: Highly suspicious (FNA if > 1 cm, Follow if > 0.5 cm)    --- End of Report ---        < end of copied text >      HEALTH ISSUES - PROBLEM Dx:  PNA (pneumonia)    Hypertensive urgency    Chronic atrial fibrillation    Elevated troponin    Stage 3 chronic kidney disease    Anemia of chronic disease    Chronic diastolic congestive heart failure    Abnormal CT scan of lung    CAD (coronary artery disease)    Depression, major    DM (diabetes mellitus), type 2    DVT prophylaxis    Multiple thyroid nodules            Consultant(s) Notes Reviewed:  [ x ] YES     Care Discussed with [X] Consultants  [x  ] Patient  [  ] Family [  ] HCP [  ]   [  ] Social Service  [  ] RN, [  ] Physical Therapy,[  ] Palliative care team  DVT PPX: [  ] Lovenox, [  ] S C Heparin, [  ] Coumadin, [  ] Xarelto, [x  ] Eliquis, [  ] Pradaxa, [  ] IV Heparin drip, [  ] SCD [  ] Contraindication 2 to GI Bleed,[  ] Ambulation [  ] Contraindicated 2 to  bleed [  ] Contraindicated 2 to Brain Bleed  Advanced directive: [x  ] None, [  ] DNR/DNI Patient is a 76y old  Male who presents with a chief complaint of multilobar PNA, afib (13 Oct 2021 22:20)    HPI:  77 yo M with PMHx of Type 2 DM (not on insulin), BPH, CAD s/p stents >4 years ago (not on plavix), diverticulitis (hospitalized 07/2020 at [facility]"), GIB 2/2 diverticulosis (2020), anxiety, Lupus, HTN, HLD, CKD stage 3, HepC, 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 Pt states he would intermittently feel chills and feverish, recorded no temps at group home since last couple of days, he states progressively worsening of dyspnea on exertion and rest, patient unable to walk short distances or climb stairs due to dyspnea, however denies orthopnea, cough, sputum production, night sweats. Patient mentioned some dizziness associated with lower extremities weakness, usually ambulates independently but now feels unable to hold his weight. Regarding palpitations, pt states for 3 days his heart would skip a beat, return to normal. Self limited episodes under a minute, no associated chest pain. Pt also describes urinary frequency beyond baseline, denies dysuria or incontinence, hematuria, constipation.     ED course:   VS: Afebrile, HR: 80 BP: 220/100->235/116-> 189/85, Spo2: 98 on NC RR: 20  Labs significant for low stable hemoglobin, BUN/creatinine: 36/2.30, Trop 0.149, Pro BMP 8061.   EKG on admission showed Afib @ 64 bpm, incompleta right BBB, LVH  CT head shows no  evidence of acute intracranial hemorrhage, midline shift or CT evidence of acute territorial infarct.  CT chest A/P demonstrated Multilobar pneumonia. Mild cardiomegaly. No mall bowel obstruction or active bowel inflammation.  Patient was given in the ED 10 mg IVP hydralazine 10 mg Labetalol IVP 1x, 2000 cc bolus NS 1x        INTERVAL HPI:  10/07/21: Pt seen and examined at bedside; in no acute distress; complains of intermittent palpitation and transient chest pain. On Zosyn 3.375gm q8h + azithromycin 500mg daily; concern for medication compliance; ??capacity. will consult psych. K replaced  10/8/21: Pt seen and examined at bedside. Pt is lethargic and is sleeping although answers with yes and no questions. He has no acute complaints. Denies fevers, chills, chest pain, SOB, abdominal pain, n/v/d/c. On Zosyn 3.375gm q8h + azithromycin 500mg daily. Blood pressure improving. PT recommended LETI yesterday.  10/9/21: Pt seen and examined at bedside. Pt with no complaints this am. Worsening Cr function this am; Nephro on board. Will continue to monitor.   10/10/21: Pt seen and examined at bedside with no complaints this am; On renally dosed zosyn; Add Zithromax, improving renal function this am; pending psych consultation with Dr Winkler.  10/11/21: Patient was seen and examined at bedside. Patient did not want to provide history and wanted to continue sleeping. Patient states he is very sleepy and wants rest. On IV Zosyn /Po Zithromax   10/12/21: Patient was seen and examined at bedside. Switched from IV Zosyn to Augmentin BID x2 days , Replaced K , PT---> HCPT  10/13/21: Patient was seen and examined at bedside. Patient was very drowsy and not able to hold meaningful conversation. Stated he does not have chest pain anymore and feels comfortable. Repeat echo performed, f/u results. 1st set of cardiac enzymes negative. 2nd set of cardiac enzymes - 0.053, 3rd set ordered- Dr. Gamboa aware , follow CK/Tropo at 10 PM & in AM ,   10/14/21: Patient was seen and examined at bedside. States he has back pain because of the hospital bed being uncomfortable and has a slight headache because he wasnt able to sleep well during the night. Patient states he wants to be discharged as soon as possible.     OVERNIGHT EVENTS: No overnight events    Home Medications:  cloNIDine 0.1 mg oral tablet: 2 tab(s) orally 3 times a day (07 Oct 2021 03:41)  digoxin 125 mcg (0.125 mg) oral tablet: 1 tab(s) orally once a day (07 Oct 2021 03:41)  Eliquis 5 mg oral tablet: 1 tab(s) orally 2 times a day (07 Oct 2021 03:41)  famotidine 40 mg oral tablet: 1 tab(s) orally once a day (at bedtime) (07 Oct 2021 03:41)  fenofibrate 145 mg oral tablet: 1 tab(s) orally once a day (07 Oct 2021 03:41)  labetalol 100 mg oral tablet: 1 tab(s) orally 2 times a day (07 Oct 2021 03:41)  Lasix 20 mg oral tablet: 1 tab(s) orally 2 times a day (07 Oct 2021 03:41)  oxybutynin 5 mg oral tablet: 1 tab(s) orally 4 times a day (07 Oct 2021 03:41)  pantoprazole 40 mg oral delayed release tablet: 1 tab(s) orally once a day (before a meal) (07 Oct 2021 03:41)  Symbicort 160 mcg-4.5 mcg/inh inhalation aerosol: 2 puff(s) inhaled 2 times a day (07 Oct 2021 03:41)      MEDICATIONS  (STANDING):  amLODIPine   Tablet 10 milliGRAM(s) Oral daily  apixaban 5 milliGRAM(s) Oral every 12 hours  ARIPiprazole 30 milliGRAM(s) Oral daily  aspirin enteric coated 81 milliGRAM(s) Oral daily  budesonide 160 MICROgram(s)/formoterol 4.5 MICROgram(s) Inhaler 2 Puff(s) Inhalation two times a day  cloNIDine 0.1 milliGRAM(s) Oral three times a day  cyanocobalamin 1000 MICROGram(s) Oral daily  dextrose 40% Gel 15 Gram(s) Oral once  dextrose 5%. 1000 milliLiter(s) (50 mL/Hr) IV Continuous <Continuous>  dextrose 50% Injectable 25 Gram(s) IV Push once  doxazosin 8 milliGRAM(s) Oral at bedtime  famotidine    Tablet 20 milliGRAM(s) Oral every 48 hours  folic acid 1 milliGRAM(s) Oral daily  furosemide    Tablet 20 milliGRAM(s) Oral daily  glucagon  Injectable 1 milliGRAM(s) IntraMuscular once  hydrALAZINE 100 milliGRAM(s) Oral every 8 hours  insulin lispro (ADMELOG) corrective regimen sliding scale   SubCutaneous three times a day before meals  isosorbide   mononitrate ER Tablet (IMDUR) 30 milliGRAM(s) Oral daily  lamoTRIgine 100 milliGRAM(s) Oral daily  mirtazapine 30 milliGRAM(s) Oral at bedtime  NIFEdipine XL 30 milliGRAM(s) Oral daily  nystatin Powder 1 Application(s) Topical two times a day  oxybutynin 5 milliGRAM(s) Oral four times a day  pantoprazole    Tablet 40 milliGRAM(s) Oral before breakfast  venlafaxine XR. 150 milliGRAM(s) Oral daily    MEDICATIONS  (PRN):  acetaminophen   Tablet .. 650 milliGRAM(s) Oral every 6 hours PRN Temp greater or equal to 38C (100.4F), Mild Pain (1 - 3)  melatonin 3 milliGRAM(s) Oral at bedtime PRN Insomnia  ondansetron Injectable 4 milliGRAM(s) IV Push every 8 hours PRN Nausea and/or Vomiting  sodium chloride 0.65% Nasal 1 Spray(s) Both Nostrils two times a day PRN Nasal Congestion      Allergies    No Known Allergies    Intolerances        Social History:  Tobacco: quit in 1980, not active smoker  EtOH: denies   Recreational drug use: cocaine several years ago "a few times" has not used in "many years"  Lives with: [facility]; group home; no nursing assistance; observation is there  Ambulates: independent, denies walker or cane but uses guard railings  ADLs: independent, but states need for assistance in bathing, cooking; independent with feeding, ambulating  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[place]  Vaccinations: Moderna x2 doses last dose in May (07 Oct 2021 01:24)      REVIEW OF SYSTEMS:  CONSTITUTIONAL: No fever, No chills, No fatigue, No myalgia, No Body ache, No Weakness  EYES: No eye pain,  No visual disturbances, No discharge, NO Redness  ENMT:  No ear pain, No nose bleed, No vertigo; No sinus pain, NO throat pain, No Congestion  NECK: No pain, No stiffness  RESPIRATORY: No cough, NO wheezing, No  hemoptysis, NO  shortness of breath  CARDIOVASCULAR: No chest pain, palpitations  GASTROINTESTINAL: No abdominal pain, NO epigastric pain. No nausea, No vomiting; No diarrhea, No constipation. [  ] BM  GENITOURINARY: No dysuria, No frequency, No urgency, No hematuria, NO incontinence  NEUROLOGICAL: (x) headaches, No dizziness, No numbness, No tingling, No tremors, No weakness  EXT: No Swelling, No Pain, No Edema  SKIN:  [x  ] No itching, burning, rashes, or lesions   MUSCULOSKELETAL: No joint pain ,No Jt swelling; No muscle pain, (+) back pain, No extremity pain  PSYCHIATRIC: No depression,  No anxiety,  No mood swings ,No difficulty sleeping at night  PAIN SCALE: [x  ] None  [  ] Other-  ROS Unable to obtain due to - [  ] Dementia  [  ] Lethargy [  ] Drowsy [  ] Sedated [  ] non verbal  REST OF REVIEW Of SYSTEM - [ x ] Normal     Vital Signs Last 24 Hrs  T(C): 36.7 (14 Oct 2021 05:27), Max: 36.7 (13 Oct 2021 11:37)  T(F): 98.1 (14 Oct 2021 05:27), Max: 98.1 (14 Oct 2021 05:27)  HR: 89 (14 Oct 2021 05:27) (67 - 89)  BP: 167/76 (14 Oct 2021 05:27) (166/81 - 182/86)  BP(mean): --  RR: 18 (14 Oct 2021 05:27) (15 - 18)  SpO2: 94% (14 Oct 2021 05:27) (94% - 96%)  Finger Stick        10-13 @ 07:01  -  10-14 @ 07:00  --------------------------------------------------------  IN: 300 mL / OUT: 0 mL / NET: 300 mL        PHYSICAL EXAM:  GENERAL:  [ x ] NAD , [x  ] well appearing, [  ] Agitated, [  ] Drowsy,  [  ] Lethargy, [  ] confused   HEAD:  [ x ] Normal, [  ] Other  EYES:  [x  ] EOMI, [ x ] PERRLA, [ x ] conjunctiva and sclera clear normal, [  ] Other,  [  ] Pallor,[  ] Discharge  ENMT:  [ x ] Normal, [ x ] Moist mucous membranes, [ x ] Good dentition, [ x ] No Thrush  NECK:  [ x ] Supple, [ x ] No JVD, [ x ] Normal thyroid, [  ] Lymphadenopathy [  ] Other  CHEST/LUNG:  x[  ] Clear to auscultation bilaterally, [ x ] Breath Sounds equal B/L [  ] poor effort  [ x ] No rales, [ x ] No rhonchi  [ x ]  No wheezing,   HEART:  [x  ] Regular rate and rhythm, [  ] tachycardia, [  ] Bradycardia,  [  ] irregular  [x ] No murmurs, No rubs, No gallops, [  ] PPM in place (Mfr:  )  ABDOMEN:  [x  ] Soft, [ x ] Nontender, [x  ] Nondistended, [ x ] No mass, [ x ] Bowel sounds present, [ x ] obese  NERVOUS SYSTEM:  [ x ] Alert & Oriented X3, [ x ] Nonfocal  [  ] Confusion  [  ] Encephalopathic [  ] Sedated [  ] Unable to assess, [  ] Dementia [  ] Other-  EXTREMITIES: [ x ] 2+ Peripheral Pulses, No clubbing, No cyanosis,  [x   ] 2 +pitting  edema B/L lower EXT. [  ] PVD stasis skin changes B/L Lower EXT, [  ] wound  LYMPH: No lymphadenopathy noted  SKIN:  [ x ] No rashes or lesions, [  ] Pressure Ulcers, [  ] ecchymosis, [  ] Skin Tears, [ x ] Other- tattoos     DIET: Diet, Consistent Carbohydrate Renal w/Evening Snack:   DASH/TLC Sodium & Cholesterol Restricted (10-07-21 @ 02:46)      LABS:      Ca    8.7        13 Oct 2021 07:01          CARDIAC MARKERS ( 14 Oct 2021 00:59 )  .055 ng/mL / x     / 46 U/L / x     / 1.3 ng/mL  CARDIAC MARKERS ( 13 Oct 2021 19:03 )  .064 ng/mL / x     / 51 U/L / x     / 1.6 ng/mL  CARDIAC MARKERS ( 13 Oct 2021 13:51 )  .063 ng/mL / x     / 51 U/L / x     / 1.2 ng/mL  CARDIAC MARKERS ( 13 Oct 2021 10:41 )  .053 ng/mL / x     / 46 U/L / x     / 1.2 ng/mL  CARDIAC MARKERS ( 13 Oct 2021 07:01 )  .035 ng/mL / x     / 51 U/L / x     / 1.2 ng/mL  CARDIAC MARKERS ( 07 Oct 2021 14:20 )  .112 ng/mL / x     / x     / x     / x            Anemia Panel:  Iron Total, Serum: 28 ug/dL (10-08-21 @ 22:27)  Iron - Total Binding Capacity.: 337 ug/dL (10-08-21 @ 22:27)  Vitamin B12, Serum: 413 pg/mL (10-08-21 @ 22:26)  Folate, Serum: 9.9 ng/mL (10-08-21 @ 22:26)  Ferritin, Serum: 28 ng/mL (10-08-21 @ 22:26)  Absolute Reticulocytes: 49.4 K/uL (10-08-21 @ 16:57)      Thyroid Panel:  Thyroid Stimulating Hormone, Serum: 0.89 uIU/mL (10-12-21 @ 07:27)      Immunofixation, Serum:   Weak IgG Kappa Band Identified    Reference Range: None Detected (10-08-21 @ 23:37)  Serum Protein Electrophoresis Interp: Weak Gamma-Migrating Paraprotein Identified (10-08-21 @ 23:37)      RADIOLOGY & ADDITIONAL TESTS:  < from: US Thyroid (10.12.21 @ 10:33) >  EXAM:  US THYROID ONLY                            PROCEDURE DATE:  10/12/2021          INTERPRETATION:  CLINICAL INFORMATION: Thyroid nodules.    COMPARISON: None available.    TECHNIQUE: Sonography of the thyroid.    FINDINGS:  Right Lobe: 2.9 cm x  1.4 cm x 2.0 cm. Heterogeneous in echotexture.    Left Lobe: 4.2 cm x 1.9 cm x 2.2 cm. Multiple nodules. For example:  *  Isoechoic nodule in the midpole, measuring 1.0 x 0.7 x 0.7 cm (TI-RAD 3)  *  Upper pole cyst, measuring 0.5 x 0.3 x 0.3 cm  *Solid hypoechoic nodule in the upper pole, measuring 0.6 x 0.4 x 0.7 cm (TI-RAD 4)    Isthmus: 4 mm.    Cervical Lymph Nodes: No enlarged or abnormal morphology cervical nodes.    IMPRESSION:    Multiple left thyroid nodules, measuring 1.0 cm in the mid pole and 0.7 cm in the upper pole (TI-RAD 3 and 4).    TI-RAD 4: Moderately suspicious (FNA if > 1.5 cm, Follow if > 1 cm)  __________________________________  ACR Thyroid Imaging, Reporting and Data System (TI-RADS): White Paper of the ACR TI-RADS Committee. J Am Bharathi Radiol 2017;14:587-595.    TI-RAD 1: Benign (No FNA)  TI-RAD 2: Not suspicious (No FNA)  TI-RAD 3: Mildly suspicious (FNA if > 2.5 cm, Follow if >1.5 cm)  TI-RAD 4: Moderately suspicious (FNA if > 1.5 cm, Follow if > 1 cm)  TI-RAD 5: Highly suspicious (FNA if > 1 cm, Follow if > 0.5 cm)    --- End of Report ---        < end of copied text >      HEALTH ISSUES - PROBLEM Dx:  PNA (pneumonia)    Hypertensive urgency    Chronic atrial fibrillation    Elevated troponin    Stage 3 chronic kidney disease    Anemia of chronic disease    Chronic diastolic congestive heart failure    Abnormal CT scan of lung    CAD (coronary artery disease)    Depression, major    DM (diabetes mellitus), type 2    DVT prophylaxis    Multiple thyroid nodules      Consultant(s) Notes Reviewed:  [ x ] YES     Care Discussed with [X] Consultants  [x  ] Patient  [  ] Family [  ] HCP [  ]   [ x ] Social Service  [ x ] RN, [  ] Physical Therapy,[  ] Palliative care team  DVT PPX: [  ] Lovenox, [  ] S C Heparin, [  ] Coumadin, [  ] Xarelto, [x  ] Eliquis, [  ] Pradaxa, [  ] IV Heparin drip, [  ] SCD [  ] Contraindication 2 to GI Bleed,[  ] Ambulation [  ] Contraindicated 2 to  bleed [  ] Contraindicated 2 to Brain Bleed  Advanced directive: [x  ] None, [  ] DNR/DNI

## 2021-10-14 NOTE — PROGRESS NOTE ADULT - SUBJECTIVE AND OBJECTIVE BOX
Patient is a 76y old  Male who presents with a chief complaint of multilobar PNA, afib (11 Oct 2021 14:23)  Patient seen in follow up for CKD 4.        PAST MEDICAL HISTORY:  Hypertension    Diabetes mellitus    Lupus    Hepatitis C    Anxiety and depression    CAD (coronary artery disease)    Diverticulosis    Hyperlipidemia    HTN (hypertension)    HLD (hyperlipidemia)    Atrial fibrillation    CAD (coronary artery disease)    Type 2 diabetes mellitus    Anxiety    History of diverticulitis    Diverticulosis      MEDICATIONS  (STANDING):  amLODIPine   Tablet 10 milliGRAM(s) Oral daily  apixaban 5 milliGRAM(s) Oral every 12 hours  ARIPiprazole 30 milliGRAM(s) Oral daily  aspirin enteric coated 81 milliGRAM(s) Oral daily  budesonide 160 MICROgram(s)/formoterol 4.5 MICROgram(s) Inhaler 2 Puff(s) Inhalation two times a day  cloNIDine 0.1 milliGRAM(s) Oral three times a day  cyanocobalamin 1000 MICROGram(s) Oral daily  dextrose 40% Gel 15 Gram(s) Oral once  dextrose 5%. 1000 milliLiter(s) (50 mL/Hr) IV Continuous <Continuous>  dextrose 50% Injectable 25 Gram(s) IV Push once  doxazosin 8 milliGRAM(s) Oral at bedtime  famotidine    Tablet 20 milliGRAM(s) Oral every 48 hours  folic acid 1 milliGRAM(s) Oral daily  furosemide    Tablet 20 milliGRAM(s) Oral daily  glucagon  Injectable 1 milliGRAM(s) IntraMuscular once  hydrALAZINE 100 milliGRAM(s) Oral every 8 hours  insulin lispro (ADMELOG) corrective regimen sliding scale   SubCutaneous three times a day before meals  isosorbide   mononitrate ER Tablet (IMDUR) 30 milliGRAM(s) Oral daily  lamoTRIgine 100 milliGRAM(s) Oral daily  mirtazapine 30 milliGRAM(s) Oral at bedtime  NIFEdipine XL 30 milliGRAM(s) Oral daily  nystatin Powder 1 Application(s) Topical two times a day  oxybutynin 5 milliGRAM(s) Oral four times a day  pantoprazole    Tablet 40 milliGRAM(s) Oral before breakfast  venlafaxine XR. 150 milliGRAM(s) Oral daily    MEDICATIONS  (PRN):  acetaminophen   Tablet .. 650 milliGRAM(s) Oral every 6 hours PRN Temp greater or equal to 38C (100.4F), Mild Pain (1 - 3)  melatonin 3 milliGRAM(s) Oral at bedtime PRN Insomnia  ondansetron Injectable 4 milliGRAM(s) IV Push every 8 hours PRN Nausea and/or Vomiting  sodium chloride 0.65% Nasal 1 Spray(s) Both Nostrils two times a day PRN Nasal Congestion    T(C): 36.7 (10-14-21 @ 17:14), Max: 36.7 (10-12-21 @ 19:24)  HR: 74 (10-14-21 @ 17:14) (67 - 89)  BP: 156/78 (10-14-21 @ 17:14) (156/78 - 197/76)  RR: 18 (10-14-21 @ 17:25)  SpO2: 94% (10-14-21 @ 17:25)  Wt(kg): --  I&O's Detail    13 Oct 2021 07:01  -  14 Oct 2021 07:00  --------------------------------------------------------  IN:    Oral Fluid: 300 mL  Total IN: 300 mL    OUT:  Total OUT: 0 mL    Total NET: 300 mL              PHYSICAL EXAM:  General: No distress  Respiratory: b/l air entry  Cardiovascular: S1 S2  Gastrointestinal: soft  Extremities:  + edema                           LABORATORY:                        8.3    7.53  )-----------( 269      ( 13 Oct 2021 06:58 )             28.0     10-13    139  |  108  |  35<H>  ----------------------------<  150<H>  3.7   |  23  |  2.90<H>    Ca    8.7      13 Oct 2021 07:01    TPro  6.9  /  Alb  2.9<L>  /  TBili  0.3  /  DBili  x   /  AST  14<L>  /  ALT  20  /  AlkPhos  29<L>  10-13    Sodium, Serum: 139 mmol/L (10-13 @ 07:01)    Potassium, Serum: 3.7 mmol/L (10-13 @ 07:01)    Hemoglobin: 8.3 g/dL (10-13 @ 06:58)  Hemoglobin: 8.0 g/dL (10-12 @ 07:27)    Creatinine, Serum 2.90 (10-13 @ 07:01)  Creatinine, Serum 2.80 (10-12 @ 07:27)    CARDIAC MARKERS ( 14 Oct 2021 00:59 )  .055 ng/mL / x     / 46 U/L / x     / 1.3 ng/mL  CARDIAC MARKERS ( 13 Oct 2021 19:03 )  .064 ng/mL / x     / 51 U/L / x     / 1.6 ng/mL  CARDIAC MARKERS ( 13 Oct 2021 13:51 )  .063 ng/mL / x     / 51 U/L / x     / 1.2 ng/mL  CARDIAC MARKERS ( 13 Oct 2021 10:41 )  .053 ng/mL / x     / 46 U/L / x     / 1.2 ng/mL  CARDIAC MARKERS ( 13 Oct 2021 07:01 )  .035 ng/mL / x     / 51 U/L / x     / 1.2 ng/mL      LIVER FUNCTIONS - ( 13 Oct 2021 07:01 )  Alb: 2.9 g/dL / Pro: 6.9 g/dL / ALK PHOS: 29 U/L / ALT: 20 U/L / AST: 14 U/L / GGT: x

## 2021-10-14 NOTE — PROGRESS NOTE ADULT - SUBJECTIVE AND OBJECTIVE BOX
Interval History:  feels better  Chart reviewed and events noted;   Overnight events:    MEDICATIONS  (STANDING):  amLODIPine   Tablet 10 milliGRAM(s) Oral daily  apixaban 5 milliGRAM(s) Oral every 12 hours  ARIPiprazole 30 milliGRAM(s) Oral daily  aspirin enteric coated 81 milliGRAM(s) Oral daily  budesonide 160 MICROgram(s)/formoterol 4.5 MICROgram(s) Inhaler 2 Puff(s) Inhalation two times a day  cloNIDine 0.1 milliGRAM(s) Oral three times a day  cyanocobalamin 1000 MICROGram(s) Oral daily  dextrose 40% Gel 15 Gram(s) Oral once  dextrose 5%. 1000 milliLiter(s) (50 mL/Hr) IV Continuous <Continuous>  dextrose 50% Injectable 25 Gram(s) IV Push once  doxazosin 8 milliGRAM(s) Oral at bedtime  famotidine    Tablet 20 milliGRAM(s) Oral every 48 hours  folic acid 1 milliGRAM(s) Oral daily  furosemide    Tablet 20 milliGRAM(s) Oral daily  glucagon  Injectable 1 milliGRAM(s) IntraMuscular once  hydrALAZINE 100 milliGRAM(s) Oral every 8 hours  insulin lispro (ADMELOG) corrective regimen sliding scale   SubCutaneous three times a day before meals  isosorbide   mononitrate ER Tablet (IMDUR) 30 milliGRAM(s) Oral daily  lamoTRIgine 100 milliGRAM(s) Oral daily  mirtazapine 30 milliGRAM(s) Oral at bedtime  NIFEdipine XL 30 milliGRAM(s) Oral daily  nystatin Powder 1 Application(s) Topical two times a day  oxybutynin 5 milliGRAM(s) Oral four times a day  pantoprazole    Tablet 40 milliGRAM(s) Oral before breakfast  venlafaxine XR. 150 milliGRAM(s) Oral daily    MEDICATIONS  (PRN):  acetaminophen   Tablet .. 650 milliGRAM(s) Oral every 6 hours PRN Temp greater or equal to 38C (100.4F), Mild Pain (1 - 3)  melatonin 3 milliGRAM(s) Oral at bedtime PRN Insomnia  ondansetron Injectable 4 milliGRAM(s) IV Push every 8 hours PRN Nausea and/or Vomiting  sodium chloride 0.65% Nasal 1 Spray(s) Both Nostrils two times a day PRN Nasal Congestion      Vital Signs Last 24 Hrs  T(C): 36.7 (14 Oct 2021 20:31), Max: 36.7 (14 Oct 2021 05:27)  T(F): 98 (14 Oct 2021 20:31), Max: 98.1 (14 Oct 2021 05:27)  HR: 70 (14 Oct 2021 20:31) (70 - 89)  BP: 106/63 (14 Oct 2021 20:31) (106/63 - 167/76)  BP(mean): --  RR: 17 (14 Oct 2021 20:31) (17 - 19)  SpO2: 97% (14 Oct 2021 20:31) (86% - 97%)    PHYSICAL EXAM  General: adult in NAD  HEENT: clear oropharynx, anicteric sclera, pink conjunctivae  Neck: supple  CV: normal S1S2 with no murmur rubs or gallops  Lungs: clear to auscultation, no wheezes, no rhales  Abdomen: soft non-tender non-distended, no hepato/splenomegaly  Ext: no clubbing cyanosis or edema  Skin: no rashes and no petichiae  Neuro: alert and oriented X3 no focal deficits      LABS:  CBC Full  -  ( 13 Oct 2021 06:58 )  WBC Count : 7.53 K/uL  RBC Count : 3.23 M/uL  Hemoglobin : 8.3 g/dL  Hematocrit : 28.0 %  Platelet Count - Automated : 269 K/uL  Mean Cell Volume : 86.7 fl  Mean Cell Hemoglobin : 25.7 pg  Mean Cell Hemoglobin Concentration : 29.6 gm/dL  Auto Neutrophil # : 6.29 K/uL  Auto Lymphocyte # : 0.60 K/uL  Auto Monocyte # : 0.44 K/uL  Auto Eosinophil # : 0.14 K/uL  Auto Basophil # : 0.02 K/uL  Auto Neutrophil % : 83.5 %  Auto Lymphocyte % : 8.0 %  Auto Monocyte % : 5.8 %  Auto Eosinophil % : 1.9 %  Auto Basophil % : 0.3 %    10-13    139  |  108  |  35<H>  ----------------------------<  150<H>  3.7   |  23  |  2.90<H>    Ca    8.7      13 Oct 2021 07:01    TPro  6.9  /  Alb  2.9<L>  /  TBili  0.3  /  DBili  x   /  AST  14<L>  /  ALT  20  /  AlkPhos  29<L>  10-13        fe studies      WBC trend  7.53 K/uL (10-13-21 @ 06:58)  5.77 K/uL (10-12-21 @ 07:27)      Hgb trend  8.3 g/dL (10-13-21 @ 06:58)  8.0 g/dL (10-12-21 @ 07:27)      plt trend  269 K/uL (10-13-21 @ 06:58)  248 K/uL (10-12-21 @ 07:27)        RADIOLOGY & ADDITIONAL STUDIES:

## 2021-10-14 NOTE — PROGRESS NOTE ADULT - PROBLEM SELECTOR PLAN 5
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 Renal dose Meds.  -  Lasix BID was on HOLD , will restart Lasix 20 mg daily as d/w renal   - Avoid nephrotoxic medications  - BMP in AM   - Dr. 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 Renal dose Meds.  -  Lasix BID was on HOLD , restarted on Lasix 20mg daily per Dr. Rodarte  - Avoid nephrotoxic medications  - BMP in AM   - Dr. Rodarte nephro following, recs appreciated   -As per Renal increase Cr is likely 2/2 tight control of BP- Will STOP BB, Lower dose of Clonidine 2/2 also Bradycardia

## 2021-10-14 NOTE — PROGRESS NOTE ADULT - PROBLEM SELECTOR PLAN 11
History of T2DM   - last A1C on 07/21 6.1  - Off of meds   - Insulin sliding scale  - Accu checks w/ hypoglycemic protocol. History of T2DM   - last A1C on 07/21 6.1  - Off  meds   - Insulin sliding scale  - Accu checks w/ hypoglycemic protocol.

## 2021-10-14 NOTE — PROGRESS NOTE ADULT - ATTENDING COMMENTS
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 presenting to \[facility] Hospital today with multiple concerns including subjective fevers, SOB, weakness, and brief episodes of palpitations, and urinary frequency "for at least 3 days". Patient admitted for hypertensive urgency, Afib and PNA.   pt seen, examined case & care plan d/w pt, residents at detail.  AM labs   PO diet   Cardio DR Gamboa D/W, -change Clonidine 0.1 mg 3x day ,started on Imdur & procardia XL 30 mg daily  -AS per Dr Rodarte -OK to restart Lasix 20 mg daily.  -PT---> HCPT with home d/c  -D/W Social work -D/C tomorrow in AM if stable.  -Total care time 45 minutes.

## 2021-10-14 NOTE — PROGRESS NOTE ADULT - PROBLEM SELECTOR PLAN 6
H/H 10.1/32 on admission, baseline hemoglobin 8.0-9.0  - Currently hemodynamically stable, likely related to CKD   - Iron studies on 07/21 demonstrated iron deficiency anemia   - F/u AM CBC, Follow w /up R/O -Plasma cell disorder  - serum immunofixation with weak IgG-kappa, normal IgG/A/M, both kappa and lambda elevated and k/l ratio sl elevated. Findings more c/w MGUS or reactive, can be monitored serially as outpatient.  - Dr Rob following- reccs appreciated above

## 2021-10-14 NOTE — PROGRESS NOTE ADULT - PROBLEM SELECTOR PLAN 1
Multilobar PNA -improving   - On admission does not meet sepsis criteria; afebrile, WBC WNL, normal lactate   - RIVP negative on admission   - CT Chest showed branching nodular opacities and patchy airspace opacities noted in the lingula and bilateral lower lobes which are likely infectious in etiology. No pleural effusions are present.   - s/p 1g Rocephin and Azithromycin 500 mg daily x 2 days  - Will switch Zosyn to Augmentin 500mg BID x  2 days per ID Dr. Garner, last dose today   - BCx NGTD,   - procalcitonin --> elevated 0.14, repeat 0.24  CBC in AM. Multilobar PNA -improving   - On admission does not meet sepsis criteria; afebrile, WBC WNL, normal lactate   - RIVP negative on admission   - CT Chest showed branching nodular opacities and patchy airspace opacities noted in the lingula and bilateral lower lobes which are likely infectious in etiology. No pleural effusions are present.   - s/p 1g Rocephin and Azithromycin 500 mg daily x 2 days  - Will switch Zosyn to Augmentin 500mg BID x  2 days per ID Dr. Garner, last dose yesterday   - BCx NGTD,   - procalcitonin --> elevated 0.14, repeat 0.24

## 2021-10-14 NOTE — PROGRESS NOTE ADULT - PROBLEM SELECTOR PLAN 9
- overnight complained of chest pain   - 1st set of cardiac enzymes negative, 2nd- 0.053, ordered 3rd set- f/u results   - On ASA continue   - Will d/c fenofibrate in the setting of worsening renal function   - Monitor and replete lytes, keep K>4, Mg>2. - overnight complained of chest pain   - trops trended till peaked; likely 2/2 htn    - On ASA continue   - Will d/c fenofibrate in the setting of worsening renal function   - Monitor and replete lytes, keep K>4, Mg>2.

## 2021-10-14 NOTE — PROGRESS NOTE ADULT - SUBJECTIVE AND OBJECTIVE BOX
CAPILLARY BLOOD GLUCOSE      POCT Blood Glucose.: 121 mg/dL (14 Oct 2021 07:57)  POCT Blood Glucose.: 146 mg/dL (13 Oct 2021 21:39)  POCT Blood Glucose.: 113 mg/dL (13 Oct 2021 16:49)  POCT Blood Glucose.: 139 mg/dL (13 Oct 2021 12:11)      Vital Signs Last 24 Hrs  T(C): 36.7 (14 Oct 2021 05:27), Max: 36.7 (14 Oct 2021 05:27)  T(F): 98.1 (14 Oct 2021 05:27), Max: 98.1 (14 Oct 2021 05:27)  HR: 89 (14 Oct 2021 05:27) (70 - 89)  BP: 167/76 (14 Oct 2021 05:27) (166/81 - 182/86)  BP(mean): --  RR: 18 (14 Oct 2021 05:27) (15 - 18)  SpO2: 94% (14 Oct 2021 05:27) (94% - 96%)    General: WN/WD NAD  Respiratory: CTA B/L  CV: RRR, S1S2, no murmurs, rubs or gallops  Abdominal: Soft, NT, ND +BS, Last BM  Extremities: No edema, + peripheral pulses     10-13    139  |  108  |  35<H>  ----------------------------<  150<H>  3.7   |  23  |  2.90<H>    Ca    8.7      13 Oct 2021 07:01    TPro  6.9  /  Alb  2.9<L>  /  TBili  0.3  /  DBili  x   /  AST  14<L>  /  ALT  20  /  AlkPhos  29<L>  10-13      dextrose 40% Gel 15 Gram(s) Oral once  dextrose 50% Injectable 25 Gram(s) IV Push once  glucagon  Injectable 1 milliGRAM(s) IntraMuscular once  insulin lispro (ADMELOG) corrective regimen sliding scale   SubCutaneous three times a day before meals

## 2021-10-14 NOTE — PROGRESS NOTE ADULT - PROBLEM SELECTOR PLAN 3
Afib   - P , Afib,- pt's PCP prescribed digoxin, Eliquis by PCP on sept 14/21  - EKG on admission showed Afib @ 64 bpm, incompleta right BBB, LVH  - overnight per remote tele patient demonstrated signs of first degree heart block confirmed by EKG although pt was asymptomatic throughout  - Last TTE on 7/21/21 demonstrate normal left ventricular systolic function, estimated LVEF of 60%.LV diastolic dysfunction. Repeat echo performed- f/u results.   - STOP digoxin- level 0.6, s/p Bradycardia & Pauses on Tele   - STOP  labetalol- as Bradycardia & Pauses   - continue with Eliquis 5mg BID   - BMP in am  - Continuous Tele   - Cardiology Dr Gamboa called by ER, f/u daily recs- Bradycardia. Afib   - P , Afib,- pt's PCP prescribed digoxin, Eliquis by PCP on sept 14/21  - EKG on admission showed Afib @ 64 bpm, incompleta right BBB, LVH  - overnight per remote tele patient demonstrated signs of first degree heart block confirmed by EKG although pt was asymptomatic throughout  - Last TTE on 7/21/21 demonstrate normal left ventricular systolic function, estimated LVEF of 60%.LV diastolic dysfunction. Repeat echo performed- f/u results.   - STOP digoxin- level 0.6, s/p Bradycardia & Pauses on Tele   - STOP  labetalol- as Bradycardia & Pauses   - Started on Procardia 30mg and Imdur 30mg per Dr. Gamboa  - continue with Eliquis 5mg BID   - BMP in am  - Continuous Tele   - Cardiology Dr Gamboa called by ER, f/u daily recs- Bradycardia.

## 2021-10-14 NOTE — PROGRESS NOTE ADULT - PROBLEM SELECTOR PLAN 4
likely demand ischemia in setting of hypertensive urgency / Uncontrolled BP  - Troponin trended to peak   - NOT ACS per cardiology Dr. Gamboa D/W today   -Repeat CK/Tropo x 2 more   on ASA   ECHO was done likely demand ischemia in setting of hypertensive urgency / Uncontrolled BP  - Troponin trended to peak   - NOT ACS per cardiology Dr. Gamboa D/W today   -Repeat CK/Tropo x 2 more   - on ASA   - repeat echo- EF 55-60%, mild mr, mild as, mild tr

## 2021-10-15 LAB
ALBUMIN SERPL ELPH-MCNC: 2.8 G/DL — LOW (ref 3.3–5)
ALP SERPL-CCNC: 31 U/L — LOW (ref 40–120)
ALT FLD-CCNC: 22 U/L — SIGNIFICANT CHANGE UP (ref 12–78)
ANION GAP SERPL CALC-SCNC: 6 MMOL/L — SIGNIFICANT CHANGE UP (ref 5–17)
AST SERPL-CCNC: 14 U/L — LOW (ref 15–37)
BASOPHILS # BLD AUTO: 0.02 K/UL — SIGNIFICANT CHANGE UP (ref 0–0.2)
BASOPHILS NFR BLD AUTO: 0.3 % — SIGNIFICANT CHANGE UP (ref 0–2)
BILIRUB SERPL-MCNC: 0.2 MG/DL — SIGNIFICANT CHANGE UP (ref 0.2–1.2)
BUN SERPL-MCNC: 28 MG/DL — HIGH (ref 7–23)
CALCIUM SERPL-MCNC: 8.9 MG/DL — SIGNIFICANT CHANGE UP (ref 8.5–10.1)
CHLORIDE SERPL-SCNC: 107 MMOL/L — SIGNIFICANT CHANGE UP (ref 96–108)
CO2 SERPL-SCNC: 25 MMOL/L — SIGNIFICANT CHANGE UP (ref 22–31)
CREAT SERPL-MCNC: 2.8 MG/DL — HIGH (ref 0.5–1.3)
EOSINOPHIL # BLD AUTO: 0.18 K/UL — SIGNIFICANT CHANGE UP (ref 0–0.5)
EOSINOPHIL NFR BLD AUTO: 3 % — SIGNIFICANT CHANGE UP (ref 0–6)
GLUCOSE SERPL-MCNC: 176 MG/DL — HIGH (ref 70–99)
HCT VFR BLD CALC: 26.1 % — LOW (ref 39–50)
HCT VFR BLD CALC: 26.3 % — LOW (ref 39–50)
HGB BLD-MCNC: 7.9 G/DL — LOW (ref 13–17)
HGB BLD-MCNC: 7.9 G/DL — LOW (ref 13–17)
IMM GRANULOCYTES NFR BLD AUTO: 0.3 % — SIGNIFICANT CHANGE UP (ref 0–1.5)
LYMPHOCYTES # BLD AUTO: 0.95 K/UL — LOW (ref 1–3.3)
LYMPHOCYTES # BLD AUTO: 16 % — SIGNIFICANT CHANGE UP (ref 13–44)
MCHC RBC-ENTMCNC: 26.2 PG — LOW (ref 27–34)
MCHC RBC-ENTMCNC: 30.3 GM/DL — LOW (ref 32–36)
MCV RBC AUTO: 86.4 FL — SIGNIFICANT CHANGE UP (ref 80–100)
MONOCYTES # BLD AUTO: 0.42 K/UL — SIGNIFICANT CHANGE UP (ref 0–0.9)
MONOCYTES NFR BLD AUTO: 7.1 % — SIGNIFICANT CHANGE UP (ref 2–14)
NEUTROPHILS # BLD AUTO: 4.36 K/UL — SIGNIFICANT CHANGE UP (ref 1.8–7.4)
NEUTROPHILS NFR BLD AUTO: 73.3 % — SIGNIFICANT CHANGE UP (ref 43–77)
NRBC # BLD: 0 /100 WBCS — SIGNIFICANT CHANGE UP (ref 0–0)
PLATELET # BLD AUTO: 282 K/UL — SIGNIFICANT CHANGE UP (ref 150–400)
POTASSIUM SERPL-MCNC: 3.9 MMOL/L — SIGNIFICANT CHANGE UP (ref 3.5–5.3)
POTASSIUM SERPL-SCNC: 3.9 MMOL/L — SIGNIFICANT CHANGE UP (ref 3.5–5.3)
PROT SERPL-MCNC: 7 G/DL — SIGNIFICANT CHANGE UP (ref 6–8.3)
RBC # BLD: 3.02 M/UL — LOW (ref 4.2–5.8)
RBC # FLD: 17.9 % — HIGH (ref 10.3–14.5)
SODIUM SERPL-SCNC: 138 MMOL/L — SIGNIFICANT CHANGE UP (ref 135–145)
WBC # BLD: 5.95 K/UL — SIGNIFICANT CHANGE UP (ref 3.8–10.5)
WBC # FLD AUTO: 5.95 K/UL — SIGNIFICANT CHANGE UP (ref 3.8–10.5)

## 2021-10-15 RX ORDER — FUROSEMIDE 40 MG
40 TABLET ORAL DAILY
Refills: 0 | Status: DISCONTINUED | OUTPATIENT
Start: 2021-10-16 | End: 2021-10-16

## 2021-10-15 RX ORDER — DIPHENHYDRAMINE HCL 50 MG
25 CAPSULE ORAL ONCE
Refills: 0 | Status: COMPLETED | OUTPATIENT
Start: 2021-10-15 | End: 2021-10-15

## 2021-10-15 RX ORDER — FUROSEMIDE 40 MG
20 TABLET ORAL ONCE
Refills: 0 | Status: COMPLETED | OUTPATIENT
Start: 2021-10-15 | End: 2021-10-15

## 2021-10-15 RX ORDER — ACETAMINOPHEN 500 MG
650 TABLET ORAL ONCE
Refills: 0 | Status: COMPLETED | OUTPATIENT
Start: 2021-10-15 | End: 2021-10-15

## 2021-10-15 RX ADMIN — Medication 20 MILLIGRAM(S): at 05:11

## 2021-10-15 RX ADMIN — PANTOPRAZOLE SODIUM 40 MILLIGRAM(S): 20 TABLET, DELAYED RELEASE ORAL at 05:10

## 2021-10-15 RX ADMIN — Medication 150 MILLIGRAM(S): at 12:29

## 2021-10-15 RX ADMIN — Medication 650 MILLIGRAM(S): at 18:34

## 2021-10-15 RX ADMIN — BUDESONIDE AND FORMOTEROL FUMARATE DIHYDRATE 2 PUFF(S): 160; 4.5 AEROSOL RESPIRATORY (INHALATION) at 18:35

## 2021-10-15 RX ADMIN — ISOSORBIDE MONONITRATE 30 MILLIGRAM(S): 60 TABLET, EXTENDED RELEASE ORAL at 12:28

## 2021-10-15 RX ADMIN — MIRTAZAPINE 30 MILLIGRAM(S): 45 TABLET, ORALLY DISINTEGRATING ORAL at 21:53

## 2021-10-15 RX ADMIN — Medication 81 MILLIGRAM(S): at 12:28

## 2021-10-15 RX ADMIN — Medication 0.1 MILLIGRAM(S): at 13:04

## 2021-10-15 RX ADMIN — Medication 5 MILLIGRAM(S): at 17:56

## 2021-10-15 RX ADMIN — Medication 1: at 12:26

## 2021-10-15 RX ADMIN — NYSTATIN CREAM 1 APPLICATION(S): 100000 CREAM TOPICAL at 05:11

## 2021-10-15 RX ADMIN — FAMOTIDINE 20 MILLIGRAM(S): 10 INJECTION INTRAVENOUS at 05:10

## 2021-10-15 RX ADMIN — Medication 0.1 MILLIGRAM(S): at 05:11

## 2021-10-15 RX ADMIN — Medication 25 MILLIGRAM(S): at 18:28

## 2021-10-15 RX ADMIN — Medication 650 MILLIGRAM(S): at 17:57

## 2021-10-15 RX ADMIN — APIXABAN 5 MILLIGRAM(S): 2.5 TABLET, FILM COATED ORAL at 17:55

## 2021-10-15 RX ADMIN — Medication 100 MILLIGRAM(S): at 21:53

## 2021-10-15 RX ADMIN — Medication 30 MILLIGRAM(S): at 05:11

## 2021-10-15 RX ADMIN — Medication 100 MILLIGRAM(S): at 13:04

## 2021-10-15 RX ADMIN — Medication 0.1 MILLIGRAM(S): at 21:53

## 2021-10-15 RX ADMIN — Medication 5 MILLIGRAM(S): at 05:11

## 2021-10-15 RX ADMIN — LAMOTRIGINE 100 MILLIGRAM(S): 25 TABLET, ORALLY DISINTEGRATING ORAL at 12:30

## 2021-10-15 RX ADMIN — Medication 8 MILLIGRAM(S): at 21:53

## 2021-10-15 RX ADMIN — Medication 20 MILLIGRAM(S): at 17:32

## 2021-10-15 RX ADMIN — Medication 100 MILLIGRAM(S): at 05:11

## 2021-10-15 RX ADMIN — PREGABALIN 1000 MICROGRAM(S): 225 CAPSULE ORAL at 12:28

## 2021-10-15 RX ADMIN — Medication 5 MILLIGRAM(S): at 12:29

## 2021-10-15 RX ADMIN — APIXABAN 5 MILLIGRAM(S): 2.5 TABLET, FILM COATED ORAL at 05:11

## 2021-10-15 RX ADMIN — BUDESONIDE AND FORMOTEROL FUMARATE DIHYDRATE 2 PUFF(S): 160; 4.5 AEROSOL RESPIRATORY (INHALATION) at 05:11

## 2021-10-15 RX ADMIN — ARIPIPRAZOLE 30 MILLIGRAM(S): 15 TABLET ORAL at 12:27

## 2021-10-15 RX ADMIN — Medication 1 MILLIGRAM(S): at 12:28

## 2021-10-15 RX ADMIN — AMLODIPINE BESYLATE 10 MILLIGRAM(S): 2.5 TABLET ORAL at 05:11

## 2021-10-15 NOTE — PROGRESS NOTE ADULT - ATTENDING COMMENTS
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 Patient admitted for hypertensive urgency, Afib and PNA.   pt seen, examined case & care plan d/w pt, residents at detail.  AM labs   PO diet   Cardio DR Gamboa D/W, -  -AS per Dr Rodarte -OK to restart Lasix 20 mg daily.  -PT---> HCPT with home d/c  -D/W Social work -D/C tomorrow in AM if stable.  -Total care time 45 minutes.

## 2021-10-15 NOTE — PROGRESS NOTE ADULT - PROBLEM SELECTOR PLAN 2
On admission /100 --> BP elevated but is improving 2/2 NON Compliance likely.  - Of note patient recently admitted with hypertensive urgency on 07/2021  - STOP labetalol 100 mg BID in the setting of Bradycardia &  worsening renal function. as per Renal ARIELLA 2/2 tight control of BP   - Change dose of clonidine 0.1 TID in the setting of Bradycardia & worsening renal function, as per Renal ARIELLA 2/2 tight control of BP  - On hydralazine 100 mg q 8 hrs + amlodipine 10 mg qd   - Started on Procardia XL 30mg and Imdur 30mg per Dr. Gamboa  -Restart Lasix 20 mg daily as d/w Dr Rodrate   - Continue to monitor BP. On admission /100 --> BP elevated but is improving 2/2 NON Compliance likely.  - Of note patient recently admitted with hypertensive urgency on 07/2021  - STOP labetalol 100 mg BID in the setting of Bradycardia &  worsening renal function. as per Renal ARIELLA 2/2 tight control of BP   - Change dose of clonidine 0.1 TID in the setting of Bradycardia & worsening renal function, as per Renal ARIELLA 2/2 tight control of BP  - On hydralazine 100 mg q 8 hrs + amlodipine 10 mg qd   - Started on Procardia XL 30mg and Imdur 30mg per Dr. Gamboa  - will start Lasix 40mg daily tomorrow 10/16  - Continue to monitor BP. Multilobar PNA -improving   - On admission does not meet sepsis criteria; afebrile, WBC WNL, normal lactate   - RIVP negative on admission   - CT Chest showed branching nodular opacities and patchy airspace opacities noted in the lingula and bilateral lower lobes which are likely infectious in etiology. No pleural effusions are present.   - s/p 1g Rocephin and Azithromycin 500 mg daily x 2 days  - Will switch Zosyn to Augmentin 500mg BID x  2 days per ID Dr. Garner, last dose yesterday   - BCx NGTD  - procalcitonin --> elevated 0.14, repeat 0.24

## 2021-10-15 NOTE — PROGRESS NOTE ADULT - PROBLEM SELECTOR PLAN 6
H/H 10.1/32 on admission, baseline hemoglobin 8.0-9.0  - Currently hemodynamically stable, likely related to CKD   - Iron studies on 07/21 demonstrated iron deficiency anemia   - F/u AM CBC, Follow w /up R/O -Plasma cell disorder  - serum immunofixation with weak IgG-kappa, normal IgG/A/M, both kappa and lambda elevated and k/l ratio sl elevated. Findings more c/w MGUS or reactive, can be monitored serially as outpatient.  - Dr Rob following- reccs appreciated above H/H 10.1/32 on admission, baseline hemoglobin 8.0-9.0  - Currently hemodynamically stable, likely related to CKD   - Iron studies on 07/21 demonstrated iron deficiency anemia   - F/u AM CBC, Follow w /up R/O -Plasma cell disorder  - serum immunofixation with weak IgG-kappa, normal IgG/A/M, both kappa and lambda elevated and k/l ratio sl elevated. Findings more c/w MGUS or reactive, can be monitored serially as outpatient.  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 Renal dose Meds.  -  Will start Lasix 40mg daily starting tomorrow 10/16- per nephro  - Avoid nephrotoxic medications  - BMP in AM   - Dr. Rodarte nephro following, recs appreciated   -As per Renal  increase Cr is likely 2/2 tight control of BP- Will STOP BB, Lower dose of Clonidine 2/2 also Bradycardia  DR Rodarte- 1u pRBC today

## 2021-10-15 NOTE — PROGRESS NOTE ADULT - PROBLEM SELECTOR PLAN 11
History of T2DM   - last A1C on 07/21 6.1  - Off  meds   - Insulin sliding scale  - Accu checks w/ hypoglycemic protocol. History of T2DM - HbA1C- 6.3   - last A1C on 07/21 6.1  - Off  meds   - Insulin sliding scale  - Accu checks w/ hypoglycemic protocol.

## 2021-10-15 NOTE — PROGRESS NOTE ADULT - PROBLEM SELECTOR PLAN 5
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 Renal dose Meds.  -  Lasix BID was on HOLD , restarted on Lasix 20mg daily per Dr. Rodarte  - Avoid nephrotoxic medications  - BMP in AM   - Dr. 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 Renal dose Meds.  -  Will start Lasix 40mg daily starting tomorrow 10/16- per nephro  - Avoid nephrotoxic medications  - BMP in AM   - Dr. Rodarte nephro following, recs appreciated   -As per Renal increase Cr is likely 2/2 tight control of BP- Will STOP BB, Lower dose of Clonidine 2/2 also Bradycardia likely demand ischemia in setting of hypertensive urgency / Uncontrolled BP  - Troponin trended to peak   - NOT ACS per cardiology Dr. Gamboa D/W today   -Repeat CK/Tropo x 2 more   - on ASA   - repeat echo- EF 55-60%, mild mr, mild as, mild tr

## 2021-10-15 NOTE — PROGRESS NOTE ADULT - PROBLEM SELECTOR PLAN 4
likely demand ischemia in setting of hypertensive urgency / Uncontrolled BP  - Troponin trended to peak   - NOT ACS per cardiology Dr. Gamboa D/W today   -Repeat CK/Tropo x 2 more   - on ASA   - repeat echo- EF 55-60%, mild mr, mild as, mild tr Afib   - P , Afib,- pt's PCP prescribed digoxin, Eliquis by PCP on sept 14/21  - EKG on admission showed Afib @ 64 bpm, incompleta right BBB, LVH  - overnight per remote tele patient demonstrated signs of first degree heart block confirmed by EKG although pt was asymptomatic throughout  - Last TTE on 7/21/21 demonstrate normal left ventricular systolic function, estimated LVEF of 60%.LV diastolic dysfunction. Repeat echo performed- f/u results.   - STOP digoxin- level 0.6, s/p Bradycardia & Pauses on Tele   - STOP  labetalol- as Bradycardia & Pauses   - Started on Procardia  XL 30mg and Imdur 30mg per Dr. Gamboa  - continue with Eliquis 5mg BID   - BMP in am  - Continuous Tele   - Cardiology Dr Gamboa called by ER, f/u daily recs- Bradycardia.

## 2021-10-15 NOTE — PROGRESS NOTE ADULT - ASSESSMENT
[ASSESSMENT and  PLAN]  75yo M admitted with HTN urgency,  /100 and multilobar pneumonia; history of GI bleed with diverticulosis; also noted to have renal insufficiency  Empirically started on oral B12 and folate; but levels replete and stopped  Iron studies c/w iron deficiency; started on IV iron therapy  Remains on  Apixiban 5mg q12h for Afib    - serum immunofixation with weak IgG-kappa, normal IgG/A/M, both kappa and lambda elevated and k/l ratio sl elevated. Findings more c/w MGUS or reactive, can be monitored serially as outpatient  - Hgb sl lower but essentially stable, no signs of active bleeds, continue serial monitor  to give 1 unit PRBC prior to discharge  - symptomatic management from Heme standpoint

## 2021-10-15 NOTE — PROGRESS NOTE ADULT - PROBLEM SELECTOR PLAN 3
Afib   - P , Afib,- pt's PCP prescribed digoxin, Eliquis by PCP on sept 14/21  - EKG on admission showed Afib @ 64 bpm, incompleta right BBB, LVH  - overnight per remote tele patient demonstrated signs of first degree heart block confirmed by EKG although pt was asymptomatic throughout  - Last TTE on 7/21/21 demonstrate normal left ventricular systolic function, estimated LVEF of 60%.LV diastolic dysfunction. Repeat echo performed- f/u results.   - STOP digoxin- level 0.6, s/p Bradycardia & Pauses on Tele   - STOP  labetalol- as Bradycardia & Pauses   - Started on Procardia 30mg and Imdur 30mg per Dr. Gamboa  - continue with Eliquis 5mg BID   - BMP in am  - Continuous Tele   - Cardiology Dr Gamboa called by ER, f/u daily recs- Bradycardia. On admission /100 --> BP elevated but is improving 2/2 NON Compliance likely.  - Of note patient recently admitted with hypertensive urgency on 07/2021  - STOP labetalol 100 mg BID in the setting of Bradycardia &  worsening renal function. as per Renal ARIELLA 2/2 tight control of BP   - Change dose of clonidine 0.1 TID in the setting of Bradycardia & worsening renal function, as per Renal ARIELLA 2/2 tight control of BP  - On hydralazine 100 mg q 8 hrs + amlodipine 10 mg qd   - Started on Procardia XL 30mg and Imdur 30mg per Dr. Gamboa  - will start Lasix 40mg daily tomorrow 10/16  - Continue to monitor BP.

## 2021-10-15 NOTE — PROGRESS NOTE ADULT - PROBLEM SELECTOR PLAN 9
- overnight complained of chest pain   - trops trended till peaked; likely 2/2 htn    - On ASA continue   - Will d/c fenofibrate in the setting of worsening renal function   - Monitor and replete lytes, keep K>4, Mg>2.

## 2021-10-15 NOTE — PROGRESS NOTE ADULT - ASSESSMENT
ARIELLA, CKD 4  Diabetes  Pneumonia  Hypertension  SLE  Hypokalemia  Anemia    Renal indices stable. Low h/h. PRBC transfusion. Increase PO lasix to 40 mg daily. Monitor blood sugar levels. Insulin coverage as needed. Monitor h/h trend. Monitor BP trend. Titrate BP meds as needed. Salt restriction. Retacrit dose. Will follow electrolytes and renal function trend. D/c planning. D/w patient regarding need for out patient nephrology follow up.

## 2021-10-15 NOTE — PROGRESS NOTE ADULT - SUBJECTIVE AND OBJECTIVE BOX
Patient is a 76y old  Male who presents with a chief complaint of multilobar PNA, afib (11 Oct 2021 14:23)  Patient seen in follow up for CKD 4.        PAST MEDICAL HISTORY:  Hypertension    Diabetes mellitus    Lupus    Hepatitis C    Anxiety and depression    CAD (coronary artery disease)    Diverticulosis    Hyperlipidemia    HTN (hypertension)    HLD (hyperlipidemia)    Atrial fibrillation    CAD (coronary artery disease)    Type 2 diabetes mellitus    Anxiety    History of diverticulitis    Diverticulosis      MEDICATIONS  (STANDING):  acetaminophen   Tablet .. 650 milliGRAM(s) Oral once  amLODIPine   Tablet 10 milliGRAM(s) Oral daily  apixaban 5 milliGRAM(s) Oral every 12 hours  ARIPiprazole 30 milliGRAM(s) Oral daily  aspirin enteric coated 81 milliGRAM(s) Oral daily  budesonide 160 MICROgram(s)/formoterol 4.5 MICROgram(s) Inhaler 2 Puff(s) Inhalation two times a day  cloNIDine 0.1 milliGRAM(s) Oral three times a day  cyanocobalamin 1000 MICROGram(s) Oral daily  dextrose 40% Gel 15 Gram(s) Oral once  dextrose 5%. 1000 milliLiter(s) (50 mL/Hr) IV Continuous <Continuous>  dextrose 50% Injectable 25 Gram(s) IV Push once  doxazosin 8 milliGRAM(s) Oral at bedtime  famotidine    Tablet 20 milliGRAM(s) Oral every 48 hours  folic acid 1 milliGRAM(s) Oral daily  furosemide   Injectable 20 milliGRAM(s) IV Push once  glucagon  Injectable 1 milliGRAM(s) IntraMuscular once  hydrALAZINE 100 milliGRAM(s) Oral every 8 hours  insulin lispro (ADMELOG) corrective regimen sliding scale   SubCutaneous three times a day before meals  isosorbide   mononitrate ER Tablet (IMDUR) 30 milliGRAM(s) Oral daily  lamoTRIgine 100 milliGRAM(s) Oral daily  mirtazapine 30 milliGRAM(s) Oral at bedtime  NIFEdipine XL 30 milliGRAM(s) Oral daily  nystatin Powder 1 Application(s) Topical two times a day  oxybutynin 5 milliGRAM(s) Oral four times a day  pantoprazole    Tablet 40 milliGRAM(s) Oral before breakfast  venlafaxine XR. 150 milliGRAM(s) Oral daily    MEDICATIONS  (PRN):  acetaminophen   Tablet .. 650 milliGRAM(s) Oral every 6 hours PRN Temp greater or equal to 38C (100.4F), Mild Pain (1 - 3)  diphenhydrAMINE 25 milliGRAM(s) Oral once PRN Rash and/or Itching  melatonin 3 milliGRAM(s) Oral at bedtime PRN Insomnia  ondansetron Injectable 4 milliGRAM(s) IV Push every 8 hours PRN Nausea and/or Vomiting  sodium chloride 0.65% Nasal 1 Spray(s) Both Nostrils two times a day PRN Nasal Congestion    T(C): 36.6 (10-15-21 @ 12:45), Max: 36.7 (10-14-21 @ 05:27)  HR: 80 (10-15-21 @ 12:45) (70 - 89)  BP: 151/69 (10-15-21 @ 12:45) (106/63 - 173/72)  RR: 18 (10-15-21 @ 12:45)  SpO2: 91% (10-15-21 @ 12:45)  Wt(kg): --  I&O's Detail    14 Oct 2021 07:01  -  15 Oct 2021 07:00  --------------------------------------------------------  IN:    Oral Fluid: 200 mL  Total IN: 200 mL    OUT:  Total OUT: 0 mL    Total NET: 200 mL            PHYSICAL EXAM:  General: No distress  Respiratory: b/l air entry  Cardiovascular: S1 S2  Gastrointestinal: soft  Extremities:  + edema                             LABORATORY:                        7.9    x     )-----------( x        ( 15 Oct 2021 14:44 )             26.3     10-15    138  |  107  |  28<H>  ----------------------------<  176<H>  3.9   |  25  |  2.80<H>    Ca    8.9      15 Oct 2021 11:24    TPro  7.0  /  Alb  2.8<L>  /  TBili  0.2  /  DBili  x   /  AST  14<L>  /  ALT  22  /  AlkPhos  31<L>  10-15    Sodium, Serum: 138 mmol/L (10-15 @ 11:24)    Potassium, Serum: 3.9 mmol/L (10-15 @ 11:24)    Hemoglobin: 7.9 g/dL (10-15 @ 14:44)  Hemoglobin: 7.9 g/dL (10-15 @ 11:24)  Hemoglobin: 8.3 g/dL (10-13 @ 06:58)    Creatinine, Serum 2.80 (10-15 @ 11:24)  Creatinine, Serum 2.90 (10-13 @ 07:01)    CARDIAC MARKERS ( 14 Oct 2021 00:59 )  .055 ng/mL / x     / 46 U/L / x     / 1.3 ng/mL  CARDIAC MARKERS ( 13 Oct 2021 19:03 )  .064 ng/mL / x     / 51 U/L / x     / 1.6 ng/mL      LIVER FUNCTIONS - ( 15 Oct 2021 11:24 )  Alb: 2.8 g/dL / Pro: 7.0 g/dL / ALK PHOS: 31 U/L / ALT: 22 U/L / AST: 14 U/L / GGT: x

## 2021-10-15 NOTE — PROGRESS NOTE ADULT - TIME BILLING
in direct care of patient , reviewing labs and other results and adjusting medications and in discussion with other consultants , RN and PMD
in direct care of patients , reviewing labs and other results and adjusting medications and in discussion with other consultants , RN and PMD

## 2021-10-15 NOTE — CHART NOTE - NSCHARTNOTEFT_GEN_A_CORE
Assessment: Pt seen for nutritional followup. Patient information obtained from patient and EMR.     Pt seen at bedside.     Factors impacting intake: [ x] none [ ] nausea  [ ] vomiting [ ] diarrhea [ ] constipation  [ ]chewing problems [ ] swallowing issues  [ ] other:     Diet Presciption: Diet, Consistent Carbohydrate Renal w/Evening Snack:   DASH/TLC {Sodium & Cholesterol Restricted} (10-07-21 @ 02:46)    Intake: %    Current Weight: 238.9# (10/15)  % Weight Change    Pertinent Medications: MEDICATIONS  (STANDING):  acetaminophen   Tablet .. 650 milliGRAM(s) Oral once  amLODIPine   Tablet 10 milliGRAM(s) Oral daily  apixaban 5 milliGRAM(s) Oral every 12 hours  ARIPiprazole 30 milliGRAM(s) Oral daily  aspirin enteric coated 81 milliGRAM(s) Oral daily  budesonide 160 MICROgram(s)/formoterol 4.5 MICROgram(s) Inhaler 2 Puff(s) Inhalation two times a day  cloNIDine 0.1 milliGRAM(s) Oral three times a day  cyanocobalamin 1000 MICROGram(s) Oral daily  dextrose 40% Gel 15 Gram(s) Oral once  dextrose 5%. 1000 milliLiter(s) (50 mL/Hr) IV Continuous <Continuous>  dextrose 50% Injectable 25 Gram(s) IV Push once  doxazosin 8 milliGRAM(s) Oral at bedtime  famotidine    Tablet 20 milliGRAM(s) Oral every 48 hours  folic acid 1 milliGRAM(s) Oral daily  furosemide   Injectable 20 milliGRAM(s) IV Push once  glucagon  Injectable 1 milliGRAM(s) IntraMuscular once  hydrALAZINE 100 milliGRAM(s) Oral every 8 hours  insulin lispro (ADMELOG) corrective regimen sliding scale   SubCutaneous three times a day before meals  isosorbide   mononitrate ER Tablet (IMDUR) 30 milliGRAM(s) Oral daily  lamoTRIgine 100 milliGRAM(s) Oral daily  mirtazapine 30 milliGRAM(s) Oral at bedtime  NIFEdipine XL 30 milliGRAM(s) Oral daily  nystatin Powder 1 Application(s) Topical two times a day  oxybutynin 5 milliGRAM(s) Oral four times a day  pantoprazole    Tablet 40 milliGRAM(s) Oral before breakfast  venlafaxine XR. 150 milliGRAM(s) Oral daily    MEDICATIONS  (PRN):  acetaminophen   Tablet .. 650 milliGRAM(s) Oral every 6 hours PRN Temp greater or equal to 38C (100.4F), Mild Pain (1 - 3)  diphenhydrAMINE 25 milliGRAM(s) Oral once PRN Rash and/or Itching  melatonin 3 milliGRAM(s) Oral at bedtime PRN Insomnia  ondansetron Injectable 4 milliGRAM(s) IV Push every 8 hours PRN Nausea and/or Vomiting  sodium chloride 0.65% Nasal 1 Spray(s) Both Nostrils two times a day PRN Nasal Congestion    Pertinent Labs: 10-15 Na138 mmol/L Glu 176 mg/dL<H> K+ 3.9 mmol/L Cr  2.80 mg/dL<H> BUN 28 mg/dL<H> 10-09 Phos 6.0 mg/dL<H> 10-15 Alb 2.8 g/dL<L> 10-07 Chol 174 mg/dL LDL --    HDL 42 mg/dL Trig 160 mg/dL<H>     CAPILLARY BLOOD GLUCOSE      POCT Blood Glucose.: 191 mg/dL (15 Oct 2021 11:53)  POCT Blood Glucose.: 103 mg/dL (15 Oct 2021 07:50)  POCT Blood Glucose.: 128 mg/dL (14 Oct 2021 21:19)  POCT Blood Glucose.: 141 mg/dL (14 Oct 2021 17:14)    Skin: intact    Estimated Needs:   [ x] no change since previous assessment  [ ] recalculated:     Previous Nutrition Diagnosis:   [ ] Inadequate Energy Intake [ ]Inadequate Oral Intake [ ] Excessive Energy Intake   [ ] Underweight [ ] Increased Nutrient Needs [ ] Overweight/Obesity   [ ] Altered GI Function [ ] Unintended Weight Loss [ ] Food & Nutrition Related Knowledge Deficit [ x]decreased nutrient needs     Nutrition Diagnosis is [ x] ongoing  [ ] resolved [ ] not applicable     New Nutrition Diagnosis: [x ] not applicable       Interventions:   Recommend  [ ] Change Diet To:  [ ] Nutrition Supplement  [ ] Nutrition Support  [ x] Other: 1. continue current diet as indicated. 2. reinforce HF education as needed 3. Continue to monitor patient PO intake, labs, weight, skin, edema, GI distress, follow up upon protocol.     Monitoring and Evaluation:   [ ] PO intake [ x ] Tolerance to diet prescription [ x ] weights [ x ] labs[ x ] follow up per protocol  [ ] other: Assessment: Pt seen for nutritional followup. Patient information obtained from patient and EMR.     Pt seen at bedside. Patient reports good appetite, usually consumes >75% of tray. Food preferences noted, pt requests extra gravy/sauces with meal.     Factors impacting intake: [ x] none [ ] nausea  [ ] vomiting [ ] diarrhea [ ] constipation  [ ]chewing problems [ ] swallowing issues  [ ] other:     Diet Presciption: Diet, Consistent Carbohydrate Renal w/Evening Snack:   DASH/TLC {Sodium & Cholesterol Restricted} (10-07-21 @ 02:46)    Intake: %    Current Weight: 238.9# (10/15)  % Weight Change    Pertinent Medications: MEDICATIONS  (STANDING):  acetaminophen   Tablet .. 650 milliGRAM(s) Oral once  amLODIPine   Tablet 10 milliGRAM(s) Oral daily  apixaban 5 milliGRAM(s) Oral every 12 hours  ARIPiprazole 30 milliGRAM(s) Oral daily  aspirin enteric coated 81 milliGRAM(s) Oral daily  budesonide 160 MICROgram(s)/formoterol 4.5 MICROgram(s) Inhaler 2 Puff(s) Inhalation two times a day  cloNIDine 0.1 milliGRAM(s) Oral three times a day  cyanocobalamin 1000 MICROGram(s) Oral daily  dextrose 40% Gel 15 Gram(s) Oral once  dextrose 5%. 1000 milliLiter(s) (50 mL/Hr) IV Continuous <Continuous>  dextrose 50% Injectable 25 Gram(s) IV Push once  doxazosin 8 milliGRAM(s) Oral at bedtime  famotidine    Tablet 20 milliGRAM(s) Oral every 48 hours  folic acid 1 milliGRAM(s) Oral daily  furosemide   Injectable 20 milliGRAM(s) IV Push once  glucagon  Injectable 1 milliGRAM(s) IntraMuscular once  hydrALAZINE 100 milliGRAM(s) Oral every 8 hours  insulin lispro (ADMELOG) corrective regimen sliding scale   SubCutaneous three times a day before meals  isosorbide   mononitrate ER Tablet (IMDUR) 30 milliGRAM(s) Oral daily  lamoTRIgine 100 milliGRAM(s) Oral daily  mirtazapine 30 milliGRAM(s) Oral at bedtime  NIFEdipine XL 30 milliGRAM(s) Oral daily  nystatin Powder 1 Application(s) Topical two times a day  oxybutynin 5 milliGRAM(s) Oral four times a day  pantoprazole    Tablet 40 milliGRAM(s) Oral before breakfast  venlafaxine XR. 150 milliGRAM(s) Oral daily    MEDICATIONS  (PRN):  acetaminophen   Tablet .. 650 milliGRAM(s) Oral every 6 hours PRN Temp greater or equal to 38C (100.4F), Mild Pain (1 - 3)  diphenhydrAMINE 25 milliGRAM(s) Oral once PRN Rash and/or Itching  melatonin 3 milliGRAM(s) Oral at bedtime PRN Insomnia  ondansetron Injectable 4 milliGRAM(s) IV Push every 8 hours PRN Nausea and/or Vomiting  sodium chloride 0.65% Nasal 1 Spray(s) Both Nostrils two times a day PRN Nasal Congestion    Pertinent Labs: 10-15 Na138 mmol/L Glu 176 mg/dL<H> K+ 3.9 mmol/L Cr  2.80 mg/dL<H> BUN 28 mg/dL<H> 10-09 Phos 6.0 mg/dL<H> 10-15 Alb 2.8 g/dL<L> 10-07 Chol 174 mg/dL LDL --    HDL 42 mg/dL Trig 160 mg/dL<H>     CAPILLARY BLOOD GLUCOSE      POCT Blood Glucose.: 191 mg/dL (15 Oct 2021 11:53)  POCT Blood Glucose.: 103 mg/dL (15 Oct 2021 07:50)  POCT Blood Glucose.: 128 mg/dL (14 Oct 2021 21:19)  POCT Blood Glucose.: 141 mg/dL (14 Oct 2021 17:14)    Skin: intact    Estimated Needs:   [ x] no change since previous assessment  [ ] recalculated:     Previous Nutrition Diagnosis:   [ ] Inadequate Energy Intake [ ]Inadequate Oral Intake [ ] Excessive Energy Intake   [ ] Underweight [ ] Increased Nutrient Needs [ ] Overweight/Obesity   [ ] Altered GI Function [ ] Unintended Weight Loss [ ] Food & Nutrition Related Knowledge Deficit [ x]decreased nutrient needs     Nutrition Diagnosis is [ x] ongoing  [ ] resolved [ ] not applicable     New Nutrition Diagnosis: [x ] not applicable       Interventions:   Recommend  [ ] Change Diet To:  [ ] Nutrition Supplement  [ ] Nutrition Support  [ x] Other: 1. continue current diet as indicated. 2. reinforce HF education as needed 3. Continue to monitor patient PO intake, labs, weight, skin, edema, GI distress, follow up upon protocol.     Monitoring and Evaluation:   [ ] PO intake [ x ] Tolerance to diet prescription [ x ] weights [ x ] labs[ x ] follow up per protocol  [ ] other:

## 2021-10-15 NOTE — PROGRESS NOTE ADULT - PROBLEM SELECTOR PLAN 1
Multilobar PNA -improving   - On admission does not meet sepsis criteria; afebrile, WBC WNL, normal lactate   - RIVP negative on admission   - CT Chest showed branching nodular opacities and patchy airspace opacities noted in the lingula and bilateral lower lobes which are likely infectious in etiology. No pleural effusions are present.   - s/p 1g Rocephin and Azithromycin 500 mg daily x 2 days  - Will switch Zosyn to Augmentin 500mg BID x  2 days per ID Dr. Garner, last dose yesterday   - BCx NGTD  - procalcitonin --> elevated 0.14, repeat 0.24 H/H 10.1/32 on admission, baseline hemoglobin 8.0-9.0  - Currently hemodynamically stable, likely related to CKD 3-4  - Iron studies on 07/21 demonstrated iron deficiency anemia   - F/u AM CBC, Follow w /up R/O -Plasma cell disorder  - serum immunofixation with weak IgG-kappa, normal IgG/A/M, both kappa and lambda elevated and k/l ratio sl elevated. Findings more c/w MGUS or reactive, can be monitored serially as outpatient.  - Dr Rodriguez  following- reccs appreciated above, 1 u pRBC today  - ordered 1 unit PRBC given decline in hgb

## 2021-10-15 NOTE — PROGRESS NOTE ADULT - SUBJECTIVE AND OBJECTIVE BOX
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 Pt states he would intermittently feel chills and feverish, recorded no temps at group home since last couple of days, he states progressively worsening of dyspnea on exertion and rest, patient unable to walk short distances or climb stairs due to dyspnea, however denies orthopnea, cough, sputum production, night sweats. Patient mentioned some dizziness associated with lower extremities weakness, usually ambulates independently but now feels unable to hold his weight. Regarding palpitations, pt states for 3 days his heart would skip a beat, return to normal. Self limited episodes under a minute, no associated chest pain. Pt also describes urinary frequency beyond baseline, denies dysuria or incontinence, hematuria, constipation.     ED course:   VS: Afebrile, HR: 80 BP: 220/100->235/116-> 189/85, Spo2: 98 on NC RR: 20  Labs significant for low stable hemoglobin, BUN/creatinine: 36/2.30, Trop 0.149, Pro BMP 8061.   EKG on admission showed Afib @ 64 bpm, incompleta right BBB, LVH  CT head shows no  evidence of acute intracranial hemorrhage, midline shift or CT evidence of acute territorial infarct.  CT chest A/P demonstrated Multilobar pneumonia. Mild cardiomegaly. No mall bowel obstruction or active bowel inflammation.  Patient was given in the ED 10 mg IVP hydralazine 10 mg Labetalol IVP 1x, 2000 cc bolus NS 1x            (07 Oct 2021 01:24)        SUBJECTIVE:      ALLERGIES:  Allergies    No Known Allergies    Intolerances          MEDICATIONS  (STANDING):  amLODIPine   Tablet 10 milliGRAM(s) Oral daily  apixaban 5 milliGRAM(s) Oral every 12 hours  ARIPiprazole 30 milliGRAM(s) Oral daily  aspirin enteric coated 81 milliGRAM(s) Oral daily  budesonide 160 MICROgram(s)/formoterol 4.5 MICROgram(s) Inhaler 2 Puff(s) Inhalation two times a day  cloNIDine 0.1 milliGRAM(s) Oral three times a day  cyanocobalamin 1000 MICROGram(s) Oral daily  dextrose 40% Gel 15 Gram(s) Oral once  dextrose 5%. 1000 milliLiter(s) (50 mL/Hr) IV Continuous <Continuous>  dextrose 50% Injectable 25 Gram(s) IV Push once  doxazosin 8 milliGRAM(s) Oral at bedtime  famotidine    Tablet 20 milliGRAM(s) Oral every 48 hours  folic acid 1 milliGRAM(s) Oral daily  glucagon  Injectable 1 milliGRAM(s) IntraMuscular once  hydrALAZINE 100 milliGRAM(s) Oral every 8 hours  insulin lispro (ADMELOG) corrective regimen sliding scale   SubCutaneous three times a day before meals  isosorbide   mononitrate ER Tablet (IMDUR) 30 milliGRAM(s) Oral daily  lamoTRIgine 100 milliGRAM(s) Oral daily  mirtazapine 30 milliGRAM(s) Oral at bedtime  NIFEdipine XL 30 milliGRAM(s) Oral daily  nystatin Powder 1 Application(s) Topical two times a day  oxybutynin 5 milliGRAM(s) Oral four times a day  pantoprazole    Tablet 40 milliGRAM(s) Oral before breakfast  venlafaxine XR. 150 milliGRAM(s) Oral daily    MEDICATIONS  (PRN):  acetaminophen   Tablet .. 650 milliGRAM(s) Oral every 6 hours PRN Temp greater or equal to 38C (100.4F), Mild Pain (1 - 3)  melatonin 3 milliGRAM(s) Oral at bedtime PRN Insomnia  ondansetron Injectable 4 milliGRAM(s) IV Push every 8 hours PRN Nausea and/or Vomiting  sodium chloride 0.65% Nasal 1 Spray(s) Both Nostrils two times a day PRN Nasal Congestion      REVIEW OF SYSTEMS:  CONSTITUTIONAL: No fever,  RESPIRATORY: No cough, wheezing, shortness of breath  CARDIOVASCULAR: No chest pain, dyspnea, palpitations, dizziness, syncope, paroxysmal nocturnal dyspnea, orthopnea, or arm or leg swelling  GASTROINTESTINAL: No abdominal  or epigastric pain, nausea, vomiting,  diarrhea  NEUROLOGICAL: No headaches,  loss of strength, numbness, or tremors    Vital Signs Last 24 Hrs  T(C): 36.5 (15 Oct 2021 21:20), Max: 36.7 (15 Oct 2021 18:17)  T(F): 97.7 (15 Oct 2021 21:20), Max: 98.1 (15 Oct 2021 18:17)  HR: 62 (15 Oct 2021 21:20) (62 - 87)  BP: 148/75 (15 Oct 2021 21:20) (148/75 - 158/77)  BP(mean): --  RR: 18 (15 Oct 2021 21:20) (18 - 18)  SpO2: 91% (15 Oct 2021 21:20) (91% - 95%)    PHYSICAL EXAM:  HEAD:  Atraumatic, Normocephalic  NECK: Supple and normal thyroid.  No JVD or carotid bruit.   HEART: S1, S2 regular , 1/6 soft ejection systolic murmur in mitral area , no thrill and no gallops .  PULMONARY: Bilateral vesicular breathing , few scattered ronchi and few scattered rales are present .  ABDOMEN: Soft nontender and positive bowl sounds   EXTREMITIES:  No clubbing, cyanosis, or pedal  edema  NEUROLOGICAL: AAOX3 , no focal deficit .    LABS:                        7.9    x     )-----------( x        ( 15 Oct 2021 14:44 )             26.3     10-15    138  |  107  |  28<H>  ----------------------------<  176<H>  3.9   |  25  |  2.80<H>    Ca    8.9      15 Oct 2021 11:24    TPro  7.0  /  Alb  2.8<L>  /  TBili  0.2  /  DBili  x   /  AST  14<L>  /  ALT  22  /  AlkPhos  31<L>  10-15    CARDIAC MARKERS ( 14 Oct 2021 00:59 )  .055 ng/mL / x     / 46 U/L / x     / 1.3 ng/mL          BNP      EKG:  ECHO:  IMAGING:    Assessment/Plan    Will continue to follow during hospital course and give further recommendations as needed . Thanks for your referral . 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 Pt states he would intermittently feel chills and feverish, recorded no temps at group home since last couple of days, he states progressively worsening of dyspnea on exertion and rest, patient unable to walk short distances or climb stairs due to dyspnea, however denies orthopnea, cough, sputum production, night sweats. Patient mentioned some dizziness associated with lower extremities weakness, usually ambulates independently but now feels unable to hold his weight. Regarding palpitations, pt states for 3 days his heart would skip a beat, return to normal. Self limited episodes under a minute, no associated chest pain. Pt also describes urinary frequency beyond baseline, denies dysuria or incontinence, hematuria, constipation.     ED course:   VS: Afebrile, HR: 80 BP: 220/100->235/116-> 189/85, Spo2: 98 on NC RR: 20  Labs significant for low stable hemoglobin, BUN/creatinine: 36/2.30, Trop 0.149, Pro BMP 8061.   EKG on admission showed Afib @ 64 bpm, incompleta right BBB, LVH  CT head shows no  evidence of acute intracranial hemorrhage, midline shift or CT evidence of acute territorial infarct.  CT chest A/P demonstrated Multilobar pneumonia. Mild cardiomegaly. No mall bowel obstruction or active bowel inflammation.  Patient was given in the ED 10 mg IVP hydralazine 10 mg Labetalol IVP 1x, 2000 cc bolus NS 1x            (07 Oct 2021 01:24)        SUBJECTIVE:      ALLERGIES:  Allergies    No Known Allergies    Intolerances          MEDICATIONS  (STANDING):  amLODIPine   Tablet 10 milliGRAM(s) Oral daily  apixaban 5 milliGRAM(s) Oral every 12 hours  ARIPiprazole 30 milliGRAM(s) Oral daily  aspirin enteric coated 81 milliGRAM(s) Oral daily  budesonide 160 MICROgram(s)/formoterol 4.5 MICROgram(s) Inhaler 2 Puff(s) Inhalation two times a day  cloNIDine 0.1 milliGRAM(s) Oral three times a day  cyanocobalamin 1000 MICROGram(s) Oral daily  dextrose 40% Gel 15 Gram(s) Oral once  dextrose 5%. 1000 milliLiter(s) (50 mL/Hr) IV Continuous <Continuous>  dextrose 50% Injectable 25 Gram(s) IV Push once  doxazosin 8 milliGRAM(s) Oral at bedtime  famotidine    Tablet 20 milliGRAM(s) Oral every 48 hours  folic acid 1 milliGRAM(s) Oral daily  glucagon  Injectable 1 milliGRAM(s) IntraMuscular once  hydrALAZINE 100 milliGRAM(s) Oral every 8 hours  insulin lispro (ADMELOG) corrective regimen sliding scale   SubCutaneous three times a day before meals  isosorbide   mononitrate ER Tablet (IMDUR) 30 milliGRAM(s) Oral daily  lamoTRIgine 100 milliGRAM(s) Oral daily  mirtazapine 30 milliGRAM(s) Oral at bedtime  NIFEdipine XL 30 milliGRAM(s) Oral daily  nystatin Powder 1 Application(s) Topical two times a day  oxybutynin 5 milliGRAM(s) Oral four times a day  pantoprazole    Tablet 40 milliGRAM(s) Oral before breakfast  venlafaxine XR. 150 milliGRAM(s) Oral daily    MEDICATIONS  (PRN):  acetaminophen   Tablet .. 650 milliGRAM(s) Oral every 6 hours PRN Temp greater or equal to 38C (100.4F), Mild Pain (1 - 3)  melatonin 3 milliGRAM(s) Oral at bedtime PRN Insomnia  ondansetron Injectable 4 milliGRAM(s) IV Push every 8 hours PRN Nausea and/or Vomiting  sodium chloride 0.65% Nasal 1 Spray(s) Both Nostrils two times a day PRN Nasal Congestion      REVIEW OF SYSTEMS:  CONSTITUTIONAL: No fever,  RESPIRATORY: No cough, wheezing, shortness of breath  CARDIOVASCULAR: No chest pain, dyspnea, palpitations, dizziness, syncope, paroxysmal nocturnal dyspnea, orthopnea, or arm or leg swelling  GASTROINTESTINAL: No abdominal  or epigastric pain, nausea, vomiting,  diarrhea  NEUROLOGICAL: No headaches,  loss of strength, numbness, or tremors    Vital Signs Last 24 Hrs  T(C): 36.5 (15 Oct 2021 21:20), Max: 36.7 (15 Oct 2021 18:17)  T(F): 97.7 (15 Oct 2021 21:20), Max: 98.1 (15 Oct 2021 18:17)  HR: 62 (15 Oct 2021 21:20) (62 - 87)  BP: 148/75 (15 Oct 2021 21:20) (148/75 - 158/77)  BP(mean): --  RR: 18 (15 Oct 2021 21:20) (18 - 18)  SpO2: 91% (15 Oct 2021 21:20) (91% - 95%)    PHYSICAL EXAM:  HEAD:  Atraumatic, Normocephalic  NECK: Supple and normal thyroid.  No JVD or carotid bruit.   HEART: S1, S2 regular , 1/6 soft ejection systolic murmur in mitral area , no thrill and no gallops .  PULMONARY: Bilateral vesicular breathing , few scattered ronchi and few scattered rales are present .  ABDOMEN: Soft nontender and positive bowl sounds   EXTREMITIES:  No clubbing, cyanosis, or pedal  edema  NEUROLOGICAL: AAOX3 , no focal deficit .    LABS:                        7.9    x     )-----------( x        ( 15 Oct 2021 14:44 )             26.3     10-15    138  |  107  |  28<H>  ----------------------------<  176<H>  3.9   |  25  |  2.80<H>    Ca    8.9      15 Oct 2021 11:24    TPro  7.0  /  Alb  2.8<L>  /  TBili  0.2  /  DBili  x   /  AST  14<L>  /  ALT  22  /  AlkPhos  31<L>  10-15    CARDIAC MARKERS ( 14 Oct 2021 00:59 )  .055 ng/mL / x     / 46 U/L / x     / 1.3 ng/mL          BNP      EKG:  ECHO:  IMAGING:    Assessment/Plan    Will continue to follow during hospital course and give further recommendations as needed . Thanks for your referral . 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 Pt states he would intermittently feel chills and feverish, recorded no temps at group home since last couple of days, he states progressively worsening of dyspnea on exertion and rest, patient unable to walk short distances or climb stairs due to dyspnea, however denies orthopnea, cough, sputum production, night sweats. Patient mentioned some dizziness associated with lower extremities weakness, usually ambulates independently but now feels unable to hold his weight. Regarding palpitations, pt states for 3 days his heart would skip a beat, return to normal. Self limited episodes under a minute, no associated chest pain. Pt also describes urinary frequency beyond baseline, denies dysuria or incontinence, hematuria, constipation.     ED course:   VS: Afebrile, HR: 80 BP: 220/100->235/116-> 189/85, Spo2: 98 on NC RR: 20  Labs significant for low stable hemoglobin, BUN/creatinine: 36/2.30, Trop 0.149, Pro BMP 8061.   EKG on admission showed Afib @ 64 bpm, incompleta right BBB, LVH  CT head shows no  evidence of acute intracranial hemorrhage, midline shift or CT evidence of acute territorial infarct.  CT chest A/P demonstrated Multilobar pneumonia. Mild cardiomegaly. No mall bowel obstruction or active bowel inflammation.  Patient was given in the ED 10 mg IVP hydralazine 10 mg Labetalol IVP 1x, 2000 cc bolus NS 1x            (07 Oct 2021 01:24)        SUBJECTIVE:      ALLERGIES:  Allergies    No Known Allergies    Intolerances          MEDICATIONS  (STANDING):  amLODIPine   Tablet 10 milliGRAM(s) Oral daily  apixaban 5 milliGRAM(s) Oral every 12 hours  ARIPiprazole 30 milliGRAM(s) Oral daily  aspirin enteric coated 81 milliGRAM(s) Oral daily  budesonide 160 MICROgram(s)/formoterol 4.5 MICROgram(s) Inhaler 2 Puff(s) Inhalation two times a day  cloNIDine 0.1 milliGRAM(s) Oral three times a day  cyanocobalamin 1000 MICROGram(s) Oral daily  dextrose 40% Gel 15 Gram(s) Oral once  dextrose 5%. 1000 milliLiter(s) (50 mL/Hr) IV Continuous <Continuous>  dextrose 50% Injectable 25 Gram(s) IV Push once  doxazosin 8 milliGRAM(s) Oral at bedtime  famotidine    Tablet 20 milliGRAM(s) Oral every 48 hours  folic acid 1 milliGRAM(s) Oral daily  glucagon  Injectable 1 milliGRAM(s) IntraMuscular once  hydrALAZINE 100 milliGRAM(s) Oral every 8 hours  insulin lispro (ADMELOG) corrective regimen sliding scale   SubCutaneous three times a day before meals  isosorbide   mononitrate ER Tablet (IMDUR) 30 milliGRAM(s) Oral daily  lamoTRIgine 100 milliGRAM(s) Oral daily  mirtazapine 30 milliGRAM(s) Oral at bedtime  NIFEdipine XL 30 milliGRAM(s) Oral daily  nystatin Powder 1 Application(s) Topical two times a day  oxybutynin 5 milliGRAM(s) Oral four times a day  pantoprazole    Tablet 40 milliGRAM(s) Oral before breakfast  venlafaxine XR. 150 milliGRAM(s) Oral daily    MEDICATIONS  (PRN):  acetaminophen   Tablet .. 650 milliGRAM(s) Oral every 6 hours PRN Temp greater or equal to 38C (100.4F), Mild Pain (1 - 3)  melatonin 3 milliGRAM(s) Oral at bedtime PRN Insomnia  ondansetron Injectable 4 milliGRAM(s) IV Push every 8 hours PRN Nausea and/or Vomiting  sodium chloride 0.65% Nasal 1 Spray(s) Both Nostrils two times a day PRN Nasal Congestion      REVIEW OF SYSTEMS:  CONSTITUTIONAL: No fever,  RESPIRATORY: Improvement in  cough, wheezing, shortness of breath  CARDIOVASCULAR: Improvement in  chest pain and  dyspnea, No  palpitations, dizziness, syncope, paroxysmal nocturnal dyspnea, and improvement in SOB and  leg swellings .  GASTROINTESTINAL: No abdominal  or epigastric pain, nausea, vomiting,  diarrhea  NEUROLOGICAL: No headaches,  loss of strength, numbness, or tremors  Skin : No itching .  Hematology : No bleeding .  Endocrinology : No heat and cold intolerance .  Psychiatry : Patient is mild anxious .  Musculoskeletal : Patient has mild arthritis .    Vital Signs Last 24 Hrs  T(C): 36.5 (15 Oct 2021 21:20), Max: 36.7 (15 Oct 2021 18:17)  T(F): 97.7 (15 Oct 2021 21:20), Max: 98.1 (15 Oct 2021 18:17)  HR: 62 (15 Oct 2021 21:20) (62 - 87)  BP: 148/75 (15 Oct 2021 21:20) (148/75 - 158/77)  BP(mean): --  RR: 18 (15 Oct 2021 21:20) (18 - 18)  SpO2: 91% (15 Oct 2021 21:20) (91% - 95%)    PHYSICAL EXAM:  HEAD:  Atraumatic, Normocephalic  NECK: Supple and normal thyroid.  No JVD or carotid bruit.   HEART: S1, S2 regular , 1/6 soft ejection systolic murmur in mitral area , no thrill and no gallops .  PULMONARY: Bilateral vesicular breathing , few scattered ronchi and few scattered rales are present .  ABDOMEN: Soft nontender and positive bowl sounds   EXTREMITIES:  No clubbing, cyanosis, or pedal  edema  NEUROLOGICAL: AAOX3 , no focal deficit .    LABS:                        7.9    x     )-----------( x        ( 15 Oct 2021 14:44 )             26.3     10-15    138  |  107  |  28<H>  ----------------------------<  176<H>  3.9   |  25  |  2.80<H>    Ca    8.9      15 Oct 2021 11:24    TPro  7.0  /  Alb  2.8<L>  /  TBili  0.2  /  DBili  x   /  AST  14<L>  /  ALT  22  /  AlkPhos  31<L>  10-15    CARDIAC MARKERS ( 14 Oct 2021 00:59 )  .055 ng/mL / x     / 46 U/L / x     / 1.3 ng/mL          BNP      EKG:  ECHO:  IMAGING:    Assessment/Plan    Will continue to follow during hospital course and give further recommendations as needed . Thanks for your referral . 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 house presenting to [facility] today with multiple concerns including subjective fevers, SOB, weakness, and brief episodes of palpitations, and urinary frequency "for at least 3 days". Pt states he would intermittently feel chills and feverish, recorded no temps at group home since last couple of days, he states progressively worsening of dyspnea on exertion and rest, patient unable to walk short distances or climb stairs due to dyspnea, however denies orthopnea, cough, sputum production, night sweats. Patient mentioned some dizziness associated with lower extremities weakness, usually ambulates independently but now feels unable to hold his weight. Regarding palpitations, pt states for 3 days his heart would skip a beat, return to normal. Self limited episodes under a minute, no associated chest pain. Pt also describes urinary frequency beyond baseline, denies dysuria or incontinence, hematuria, constipation.     ED course:   VS: Afebrile, HR: 80 BP: 220/100->235/116-> 189/85, Spo2: 98 on NC RR: 20  Labs significant for low stable hemoglobin, BUN/creatinine: 36/2.30, Trop 0.149, Pro BMP 8061.   EKG on admission showed Afib @ 64 bpm, incompleta right BBB, LVH  CT head shows no  evidence of acute intracranial hemorrhage, midline shift or CT evidence of acute territorial infarct.  CT chest A/P demonstrated Multilobar pneumonia. Mild cardiomegaly. No mall bowel obstruction or active bowel inflammation.  Patient was given in the ED 10 mg IVP hydralazine 10 mg Labetalol IVP 1x, 2000 cc bolus NS 1x            (07 Oct 2021 01:24)        SUBJECTIVE:      ALLERGIES:  Allergies    No Known Allergies    Intolerances          MEDICATIONS  (STANDING):  amLODIPine   Tablet 10 milliGRAM(s) Oral daily  apixaban 5 milliGRAM(s) Oral every 12 hours  ARIPiprazole 30 milliGRAM(s) Oral daily  aspirin enteric coated 81 milliGRAM(s) Oral daily  budesonide 160 MICROgram(s)/formoterol 4.5 MICROgram(s) Inhaler 2 Puff(s) Inhalation two times a day  cloNIDine 0.1 milliGRAM(s) Oral three times a day  cyanocobalamin 1000 MICROGram(s) Oral daily  dextrose 40% Gel 15 Gram(s) Oral once  dextrose 5%. 1000 milliLiter(s) (50 mL/Hr) IV Continuous <Continuous>  dextrose 50% Injectable 25 Gram(s) IV Push once  doxazosin 8 milliGRAM(s) Oral at bedtime  famotidine    Tablet 20 milliGRAM(s) Oral every 48 hours  folic acid 1 milliGRAM(s) Oral daily  glucagon  Injectable 1 milliGRAM(s) IntraMuscular once  hydrALAZINE 100 milliGRAM(s) Oral every 8 hours  insulin lispro (ADMELOG) corrective regimen sliding scale   SubCutaneous three times a day before meals  isosorbide   mononitrate ER Tablet (IMDUR) 30 milliGRAM(s) Oral daily  lamoTRIgine 100 milliGRAM(s) Oral daily  mirtazapine 30 milliGRAM(s) Oral at bedtime  NIFEdipine XL 30 milliGRAM(s) Oral daily  nystatin Powder 1 Application(s) Topical two times a day  oxybutynin 5 milliGRAM(s) Oral four times a day  pantoprazole    Tablet 40 milliGRAM(s) Oral before breakfast  venlafaxine XR. 150 milliGRAM(s) Oral daily    MEDICATIONS  (PRN):  acetaminophen   Tablet .. 650 milliGRAM(s) Oral every 6 hours PRN Temp greater or equal to 38C (100.4F), Mild Pain (1 - 3)  melatonin 3 milliGRAM(s) Oral at bedtime PRN Insomnia  ondansetron Injectable 4 milliGRAM(s) IV Push every 8 hours PRN Nausea and/or Vomiting  sodium chloride 0.65% Nasal 1 Spray(s) Both Nostrils two times a day PRN Nasal Congestion      REVIEW OF SYSTEMS:  CONSTITUTIONAL: No fever,  RESPIRATORY: Improvement in  cough, wheezing, shortness of breath  CARDIOVASCULAR: Improvement in  chest pain and  dyspnea, No  palpitations, dizziness, syncope, paroxysmal nocturnal dyspnea, and improvement in SOB and  leg swellings .  GASTROINTESTINAL: No abdominal  or epigastric pain, nausea, vomiting,  diarrhea  NEUROLOGICAL: No headaches,  loss of strength, numbness, or tremors  Skin : No itching .  Hematology : No bleeding .  Endocrinology : No heat and cold intolerance .  Psychiatry : Patient is mild anxious .  Musculoskeletal : Patient has mild arthritis .    Vital Signs Last 24 Hrs  T(C): 36.5 (15 Oct 2021 21:20), Max: 36.7 (15 Oct 2021 18:17)  T(F): 97.7 (15 Oct 2021 21:20), Max: 98.1 (15 Oct 2021 18:17)  HR: 62 (15 Oct 2021 21:20) (62 - 87)  BP: 148/75 (15 Oct 2021 21:20) (148/75 - 158/77)  BP(mean): --  RR: 18 (15 Oct 2021 21:20) (18 - 18)  SpO2: 91% (15 Oct 2021 21:20) (91% - 95%)    PHYSICAL EXAM:  HEAD:  Atraumatic, Normocephalic  NECK: Supple and normal thyroid.  No JVD or carotid bruit.   HEART: S1, S2 regular , 1/6 soft ejection systolic murmur in mitral area , no thrill and no gallops .  PULMONARY: Bilateral vesicular breathing , few scattered ronchi and few scattered rales are present .  ABDOMEN: Soft nontender and positive bowl sounds   EXTREMITIES:  No clubbing, cyanosis, or pedal  edema  NEUROLOGICAL: AAOX3 , no focal deficit .    LABS:                        7.9    x     )-----------( x        ( 15 Oct 2021 14:44 )             26.3     10-15    138  |  107  |  28<H>  ----------------------------<  176<H>  3.9   |  25  |  2.80<H>    Ca    8.9      15 Oct 2021 11:24    TPro  7.0  /  Alb  2.8<L>  /  TBili  0.2  /  DBili  x   /  AST  14<L>  /  ALT  22  /  AlkPhos  31<L>  10-15    CARDIAC MARKERS ( 14 Oct 2021 00:59 )  .055 ng/mL / x     / 46 U/L / x     / 1.3 ng/mL          Assessment/Plan  Patient has :  1) Brief episode of chest pain which is atypical  and mild elevated Troponin I are demand ischemia due to uncontrolled Hypertension and  not NON-ST KATERINA  as primary event .  2) Mild elevated Troponin I secondary to demand ischemia and not ACS or NON-ST KATERINA as primary event .  3) Paroxysmal atrial  fibrillation and has symptomatic pause of 3.2 seconds and less than 4.0 second pause in the setting of atrial fibrillation and asymptomatic pause is benign .  4) Chronic mild diastolic heart failure and stable   5) Chronic anemia .  6) Chronic renal insufficiency .  7) Hypertension and BP intermittently mild elevated   Plan : 1) Monitor hemoglobin and electrolytes 2) Decrease clonidine due to bradycardia , Patient already off beta blockers and TSH levels are normal and increase doxazosin for better control of BP  3) Rest of medications as such . 4) Prognosis guarded  6) Continue  Imdur 30 mg and increase  Nifedipine XL 60 mg daily for better control of BP . Nifedipine can cause reflex tachycardia so will help for bradycardia and eventually if possible will discontinue clonidine as out patient . 7) [facility] .  Will continue to follow during hospital course and give further recommendations as needed . Thanks for your referral . if any questions please contact me at office [phone])

## 2021-10-15 NOTE — PROGRESS NOTE ADULT - SUBJECTIVE AND OBJECTIVE BOX
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 today with multiple concerns including subjective fevers, SOB, weakness, and brief episodes of palpitations, and urinary frequency "for at least 3 days". Pt states he would intermittently feel chills and feverish, recorded no temps at group home since last couple of days, he states progressively worsening of dyspnea on exertion and rest, patient unable to walk short distances or climb stairs due to dyspnea, however denies orthopnea, cough, sputum production, night sweats. Patient mentioned some dizziness associated with lower extremities weakness, usually ambulates independently but now feels unable to hold his weight. Regarding palpitations, pt states for 3 days his heart would skip a beat, return to normal. Self limited episodes under a minute, no associated chest pain. Pt also describes urinary frequency beyond baseline, denies dysuria or incontinence, hematuria, constipation.     ED course:   VS: Afebrile, HR: 80 BP: 220/100->235/116-> 189/85, Spo2: 98 on NC RR: 20  Labs significant for low stable hemoglobin, BUN/creatinine: 36/2.30, Trop 0.149, Pro BMP 8061.   EKG on admission showed Afib @ 64 bpm, incompleta right BBB, LVH  CT head shows no  evidence of acute intracranial hemorrhage, midline shift or CT evidence of acute territorial infarct.  CT chest A/P demonstrated Multilobar pneumonia. Mild cardiomegaly. No mall bowel obstruction or active bowel inflammation.  Patient was given in the ED 10 mg IVP hydralazine 10 mg Labetalol IVP 1x, 2000 cc bolus NS 1x            (07 Oct 2021 01:24)    INTERVAL HPI:  10/07/21: Pt seen and examined at bedside; in no acute distress; complains of intermittent palpitation and transient chest pain. On Zosyn 3.375gm q8h + azithromycin 500mg daily; concern for medication compliance; ??capacity. will consult psych. K replaced  10/8/21: Pt seen and examined at bedside. Pt is lethargic and is sleeping although answers with yes and no questions. He has no acute complaints. Denies fevers, chills, chest pain, SOB, abdominal pain, n/v/d/c. On Zosyn 3.375gm q8h + azithromycin 500mg daily. Blood pressure improving. PT recommended LETI yesterday.  10/9/21: Pt seen and examined at bedside. Pt with no complaints this am. Worsening Cr function this am; Nephro on board. Will continue to monitor.   10/10/21: Pt seen and examined at bedside with no complaints this am; On renally dosed zosyn; Add Zithromax, improving renal function this am; pending psych consultation with Dr Winkler.  10/11/21: Patient was seen and examined at bedside. Patient did not want to provide history and wanted to continue sleeping. Patient states he is very sleepy and wants rest. On IV Zosyn /Po Zithromax   10/12/21: Patient was seen and examined at bedside. Switched from IV Zosyn to Augmentin BID x2 days , Replaced K , PT---> HCPT  10/13/21: Patient was seen and examined at bedside. Patient was very drowsy and not able to hold meaningful conversation. Stated he does not have chest pain anymore and feels comfortable. Repeat echo performed, f/u results. 1st set of cardiac enzymes negative. 2nd set of cardiac enzymes - 0.053, 3rd set ordered- Dr. Gamboa aware , follow CK/Tropo at 10 PM & in AM ,   10/14/21: Patient was seen and examined at bedside. States he has back pain because of the hospital bed being uncomfortable and has a slight headache because he wasnt able to sleep well during the night. Patient states he wants to be discharged as soon as possible.   10/15/21: Patient was seen and examined at bedside. Patient was sleeping comfortably in bed. When awoken, states he has no complaints. Patient planned for d/c today. Heme/onc possible transfusion of 1 unit PRBC prior to discharge given hgb on the lower side.     OVERNIGHT EVENTS: no acute overnight events    Home Medications:  Eliquis 5 mg oral tablet: 1 tab(s) orally 2 times a day (07 Oct 2021 03:41)  famotidine 40 mg oral tablet: 1 tab(s) orally once a day (at bedtime) (07 Oct 2021 03:41)  furosemide 20 mg oral tablet: 1 tab(s) orally once a day (14 Oct 2021 12:01)  oxybutynin 5 mg oral tablet: 1 tab(s) orally 4 times a day (07 Oct 2021 03:41)  pantoprazole 40 mg oral delayed release tablet: 1 tab(s) orally once a day (before a meal) (07 Oct 2021 03:41)  Symbicort 160 mcg-4.5 mcg/inh inhalation aerosol: 2 puff(s) inhaled 2 times a day (07 Oct 2021 03:41)      MEDICATIONS  (STANDING):  amLODIPine   Tablet 10 milliGRAM(s) Oral daily  apixaban 5 milliGRAM(s) Oral every 12 hours  ARIPiprazole 30 milliGRAM(s) Oral daily  aspirin enteric coated 81 milliGRAM(s) Oral daily  budesonide 160 MICROgram(s)/formoterol 4.5 MICROgram(s) Inhaler 2 Puff(s) Inhalation two times a day  cloNIDine 0.1 milliGRAM(s) Oral three times a day  cyanocobalamin 1000 MICROGram(s) Oral daily  dextrose 40% Gel 15 Gram(s) Oral once  dextrose 5%. 1000 milliLiter(s) (50 mL/Hr) IV Continuous <Continuous>  dextrose 50% Injectable 25 Gram(s) IV Push once  doxazosin 8 milliGRAM(s) Oral at bedtime  famotidine    Tablet 20 milliGRAM(s) Oral every 48 hours  folic acid 1 milliGRAM(s) Oral daily  furosemide    Tablet 20 milliGRAM(s) Oral daily  glucagon  Injectable 1 milliGRAM(s) IntraMuscular once  hydrALAZINE 100 milliGRAM(s) Oral every 8 hours  insulin lispro (ADMELOG) corrective regimen sliding scale   SubCutaneous three times a day before meals  isosorbide   mononitrate ER Tablet (IMDUR) 30 milliGRAM(s) Oral daily  lamoTRIgine 100 milliGRAM(s) Oral daily  mirtazapine 30 milliGRAM(s) Oral at bedtime  NIFEdipine XL 30 milliGRAM(s) Oral daily  nystatin Powder 1 Application(s) Topical two times a day  oxybutynin 5 milliGRAM(s) Oral four times a day  pantoprazole    Tablet 40 milliGRAM(s) Oral before breakfast  venlafaxine XR. 150 milliGRAM(s) Oral daily    MEDICATIONS  (PRN):  acetaminophen   Tablet .. 650 milliGRAM(s) Oral every 6 hours PRN Temp greater or equal to 38C (100.4F), Mild Pain (1 - 3)  melatonin 3 milliGRAM(s) Oral at bedtime PRN Insomnia  ondansetron Injectable 4 milliGRAM(s) IV Push every 8 hours PRN Nausea and/or Vomiting  sodium chloride 0.65% Nasal 1 Spray(s) Both Nostrils two times a day PRN Nasal Congestion      Allergies    No Known Allergies    Intolerances        Social History:  Tobacco: quit in 1980, not active smoker  EtOH: denies   Recreational drug use: cocaine several years ago "a few times" has not used in "many years"  Lives with: [facility]; group home; no nursing assistance; observation is there  Ambulates: independent, denies walker or cane but uses guard railings  ADLs: independent, but states need for assistance in bathing, cooking; independent with feeding, ambulating  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[place]  Vaccinations: Moderna x2 doses last dose in May (07 Oct 2021 01:24)      REVIEW OF SYSTEMS:  CONSTITUTIONAL: No fever, No chills, No fatigue, No myalgia, No Body ache, No Weakness  EYES: No eye pain,  No visual disturbances, No discharge, NO Redness  ENMT:  No ear pain, No nose bleed, No vertigo; No sinus pain, NO throat pain, No Congestion  NECK: No pain, No stiffness  RESPIRATORY: No cough, NO wheezing, No  hemoptysis, NO  shortness of breath  CARDIOVASCULAR: No chest pain, palpitations  GASTROINTESTINAL: No abdominal pain, NO epigastric pain. No nausea, No vomiting; No diarrhea, No constipation. [  ] BM  GENITOURINARY: No dysuria, No frequency, No urgency, No hematuria, NO incontinence  NEUROLOGICAL: No headaches, No dizziness, No numbness, No tingling, No tremors, No weakness  EXT: No Swelling, No Pain, No Edema  SKIN:  [ x ] No itching, burning, rashes, or lesions   MUSCULOSKELETAL: No joint pain ,No Jt swelling; No muscle pain, No back pain, No extremity pain  PSYCHIATRIC: No depression,  No anxiety,  No mood swings ,No difficulty sleeping at night  PAIN SCALE: [ x ] None  [  ] Other-  ROS Unable to obtain due to - [  ] Dementia  [  ] Lethargy [  ] Drowsy [  ] Sedated [  ] non verbal  REST OF REVIEW Of SYSTEM - [ x ] Normal     Vital Signs Last 24 Hrs  T(C): 36.4 (15 Oct 2021 04:25), Max: 36.7 (14 Oct 2021 17:14)  T(F): 97.5 (15 Oct 2021 04:25), Max: 98.1 (14 Oct 2021 17:14)  HR: 87 (15 Oct 2021 04:25) (70 - 87)  BP: 154/93 (15 Oct 2021 04:25) (106/63 - 166/76)  BP(mean): --  RR: 18 (15 Oct 2021 04:25) (17 - 19)  SpO2: 95% (15 Oct 2021 04:25) (86% - 97%)  Finger Stick        10-14 @ 07:01  -  10-15 @ 07:00  --------------------------------------------------------  IN: 200 mL / OUT: 0 mL / NET: 200 mL        PHYSICAL EXAM:  GENERAL:  [ x ] NAD , [x  ] well appearing, [  ] Agitated, [  ] Drowsy,  [  ] Lethargy, [  ] confused   HEAD:  [ x ] Normal, [  ] Other  EYES:  [x  ] EOMI, [  ] PERRLA, [ x ] conjunctiva and sclera clear normal, [  ] Other,  [  ] Pallor,[  ] Discharge  ENMT:  [ x ] Normal, [ x ] Moist mucous membranes, [  ] Good dentition, [  ] No Thrush  NECK:  [  x] Supple, [  x] No JVD, [  ] Normal thyroid, [  ] Lymphadenopathy [  ] Other  CHEST/LUNG:  [  x] Clear to auscultation bilaterally, [ x ] Breath Sounds equal B/L  [  ] poor effort  [x  ] No rales, [ x ] No rhonchi  [  x]  No wheezing,   HEART:  [ x ] Regular rate and rhythm, [  ] tachycardia, [  ] Bradycardia,  [  ] irregular  [ x ] No murmurs, No rubs, No gallops, [  ] PPM in place (Mfr:  )  ABDOMEN:  [ x ] Soft, [x  ] Nontender, [  ]x Nondistended, [  ] No mass, [  ] Bowel sounds present, [  ] obese  NERVOUS SYSTEM:  [ x ] Alert & Oriented X3, [  ] Nonfocal  [  ] Confusion  [  ] Encephalopathic [  ] Sedated [  ] Unable to assess, [  ] Dementia [  ] Other-  EXTREMITIES: [ x ] 2+ Peripheral Pulses, No clubbing, No cyanosis,  [  ] edema B/L lower EXT. [  ] PVD stasis skin changes B/L Lower EXT, [  ] wound  LYMPH: No lymphadenopathy noted  SKIN:  [x  ] No rashes or lesions, [  ] Pressure Ulcers, [  ] ecchymosis, [  ] Skin Tears, [  ] Other    DIET: Diet, Consistent Carbohydrate Renal w/Evening Snack:   DASH/TLC Sodium & Cholesterol Restricted (10-07-21 @ 02:46)      LABS:                        CARDIAC MARKERS ( 14 Oct 2021 00:59 )  .055 ng/mL / x     / 46 U/L / x     / 1.3 ng/mL  CARDIAC MARKERS ( 13 Oct 2021 19:03 )  .064 ng/mL / x     / 51 U/L / x     / 1.6 ng/mL  CARDIAC MARKERS ( 13 Oct 2021 13:51 )  .063 ng/mL / x     / 51 U/L / x     / 1.2 ng/mL  CARDIAC MARKERS ( 13 Oct 2021 10:41 )  .053 ng/mL / x     / 46 U/L / x     / 1.2 ng/mL  CARDIAC MARKERS ( 13 Oct 2021 07:01 )  .035 ng/mL / x     / 51 U/L / x     / 1.2 ng/mL             Anemia Panel:  Iron Total, Serum: 28 ug/dL (10-08-21 @ 22:27)  Iron - Total Binding Capacity.: 337 ug/dL (10-08-21 @ 22:27)  Vitamin B12, Serum: 413 pg/mL (10-08-21 @ 22:26)  Folate, Serum: 9.9 ng/mL (10-08-21 @ 22:26)  Ferritin, Serum: 28 ng/mL (10-08-21 @ 22:26)  Absolute Reticulocytes: 49.4 K/uL (10-08-21 @ 16:57)      Thyroid Panel:  Thyroid Stimulating Hormone, Serum: 0.89 uIU/mL (10-12-21 @ 07:27)            Immunofixation, Serum:   Weak IgG Kappa Band Identified    Reference Range: None Detected (10-08-21 @ 23:37)  Serum Protein Electrophoresis Interp: Weak Gamma-Migrating Paraprotein Identified (10-08-21 @ 23:37)      RADIOLOGY & ADDITIONAL TESTS:      HEALTH ISSUES - PROBLEM Dx:  PNA (pneumonia)    Hypertensive urgency    Chronic atrial fibrillation    Elevated troponin    Stage 3 chronic kidney disease    Anemia of chronic disease    Chronic diastolic congestive heart failure    Abnormal CT scan of lung    CAD (coronary artery disease)    Depression, major    DM (diabetes mellitus), type 2    DVT prophylaxis    Multiple thyroid nodules            Consultant(s) Notes Reviewed:  [ x ] YES     Care Discussed with [X] Consultants  [x  ] Patient  [  ] Family [  ] HCP [  ]   [  ] Social Service  [  ] RN, [  ] Physical Therapy,[  ] Palliative care team  DVT PPX: [  ] Lovenox, [  ] S C Heparin, [  ] Coumadin, [  ] Xarelto, [ x ] Eliquis, [  ] Pradaxa, [  ] IV Heparin drip, [  ] SCD [  ] Contraindication 2 to GI Bleed,[  ] Ambulation [  ] Contraindicated 2 to  bleed [  ] Contraindicated 2 to Brain Bleed  Advanced directive: [ x ] None, [  ] DNR/DNI Patient is a 76y old  Male who presents with a chief complaint of multilobar PNA, afib (14 Oct 2021 23:49)    HPI:  77 yo M with PMHx of Type 2 DM (not on insulin), BPH, CAD s/p stents >4 years ago (not on plavix), diverticulitis (hospitalized 07/2020 at [facility]), GIB 2/2 diverticulosis (2020), anxiety, Lupus, HTN, HLD, CKD stage 3, HepC, 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 Pt states he would intermittently feel chills and feverish, recorded no temps at group home since last couple of days, he states progressively worsening of dyspnea on exertion and rest, patient unable to walk short distances or climb stairs due to dyspnea, however denies orthopnea, cough, sputum production, night sweats. Patient mentioned some dizziness associated with lower extremities weakness, usually ambulates independently but now feels unable to hold his weight. Regarding palpitations, pt states for 3 days his heart would skip a beat, return to normal. Self limited episodes under a minute, no associated chest pain. Pt also describes urinary frequency beyond baseline, denies dysuria or incontinence, hematuria, constipation.     ED course:   VS: Afebrile, HR: 80 BP: 220/100->235/116-> 189/85, Spo2: 98 on NC RR: 20  Labs significant for low stable hemoglobin, BUN/creatinine: 36/2.30, Trop 0.149, Pro BMP 8061.   EKG on admission showed Afib @ 64 bpm, incompleta right BBB, LVH  CT head shows no  evidence of acute intracranial hemorrhage, midline shift or CT evidence of acute territorial infarct.  CT chest A/P demonstrated Multilobar pneumonia. Mild cardiomegaly. No mall bowel obstruction or active bowel inflammation.  Patient was given in the ED 10 mg IVP hydralazine 10 mg Labetalol IVP 1x, 2000 cc bolus NS 1x            (07 Oct 2021 01:24)    INTERVAL HPI:  10/07/21: Pt seen and examined at bedside; in no acute distress; complains of intermittent palpitation and transient chest pain. On Zosyn 3.375gm q8h + azithromycin 500mg daily; concern for medication compliance; ??capacity. will consult psych. K replaced  10/8/21: Pt seen and examined at bedside. Pt is lethargic and is sleeping although answers with yes and no questions. He has no acute complaints. Denies fevers, chills, chest pain, SOB, abdominal pain, n/v/d/c. On Zosyn 3.375gm q8h + azithromycin 500mg daily. Blood pressure improving. PT recommended LETI yesterday.  10/9/21: Pt seen and examined at bedside. Pt with no complaints this am. Worsening Cr function this am; Nephro on board. Will continue to monitor.   10/10/21: Pt seen and examined at bedside with no complaints this am; On renally dosed zosyn; Add Zithromax, improving renal function this am; pending psych consultation with Dr Winkler.  10/11/21: Patient was seen and examined at bedside. Patient did not want to provide history and wanted to continue sleeping. Patient states he is very sleepy and wants rest. On IV Zosyn /Po Zithromax   10/12/21: Patient was seen and examined at bedside. Switched from IV Zosyn to Augmentin BID x2 days , Replaced K , PT---> HCPT  10/13/21: Patient was seen and examined at bedside. Patient was very drowsy and not able to hold meaningful conversation. Stated he does not have chest pain anymore and feels comfortable. Repeat echo performed, f/u results. 1st set of cardiac enzymes negative. 2nd set of cardiac enzymes - 0.053, 3rd set ordered- Dr. Gamboa aware , follow CK/Tropo at 10 PM & in AM ,   10/14/21: Patient was seen and examined at bedside. States he has back pain because of the hospital bed being uncomfortable and has a slight headache because he wasnt able to sleep well during the night. Patient states he wants to be discharged as soon as possible.   10/15/21: Patient was seen and examined at bedside. Patient was sleeping comfortably in bed. When awoken, states he has no complaints. Patient planned for d/c today. Will receive 1 unit PRBC. Change lasix to 40mg daily starting tomorrow per nephro.     OVERNIGHT EVENTS: no acute overnight events    Home Medications:  Eliquis 5 mg oral tablet: 1 tab(s) orally 2 times a day (07 Oct 2021 03:41)  famotidine 40 mg oral tablet: 1 tab(s) orally once a day (at bedtime) (07 Oct 2021 03:41)  furosemide 20 mg oral tablet: 1 tab(s) orally once a day (14 Oct 2021 12:01)  oxybutynin 5 mg oral tablet: 1 tab(s) orally 4 times a day (07 Oct 2021 03:41)  pantoprazole 40 mg oral delayed release tablet: 1 tab(s) orally once a day (before a meal) (07 Oct 2021 03:41)  Symbicort 160 mcg-4.5 mcg/inh inhalation aerosol: 2 puff(s) inhaled 2 times a day (07 Oct 2021 03:41)      MEDICATIONS  (STANDING):  amLODIPine   Tablet 10 milliGRAM(s) Oral daily  apixaban 5 milliGRAM(s) Oral every 12 hours  ARIPiprazole 30 milliGRAM(s) Oral daily  aspirin enteric coated 81 milliGRAM(s) Oral daily  budesonide 160 MICROgram(s)/formoterol 4.5 MICROgram(s) Inhaler 2 Puff(s) Inhalation two times a day  cloNIDine 0.1 milliGRAM(s) Oral three times a day  cyanocobalamin 1000 MICROGram(s) Oral daily  dextrose 40% Gel 15 Gram(s) Oral once  dextrose 5%. 1000 milliLiter(s) (50 mL/Hr) IV Continuous <Continuous>  dextrose 50% Injectable 25 Gram(s) IV Push once  doxazosin 8 milliGRAM(s) Oral at bedtime  famotidine    Tablet 20 milliGRAM(s) Oral every 48 hours  folic acid 1 milliGRAM(s) Oral daily  furosemide    Tablet 20 milliGRAM(s) Oral daily  glucagon  Injectable 1 milliGRAM(s) IntraMuscular once  hydrALAZINE 100 milliGRAM(s) Oral every 8 hours  insulin lispro (ADMELOG) corrective regimen sliding scale   SubCutaneous three times a day before meals  isosorbide   mononitrate ER Tablet (IMDUR) 30 milliGRAM(s) Oral daily  lamoTRIgine 100 milliGRAM(s) Oral daily  mirtazapine 30 milliGRAM(s) Oral at bedtime  NIFEdipine XL 30 milliGRAM(s) Oral daily  nystatin Powder 1 Application(s) Topical two times a day  oxybutynin 5 milliGRAM(s) Oral four times a day  pantoprazole    Tablet 40 milliGRAM(s) Oral before breakfast  venlafaxine XR. 150 milliGRAM(s) Oral daily    MEDICATIONS  (PRN):  acetaminophen   Tablet .. 650 milliGRAM(s) Oral every 6 hours PRN Temp greater or equal to 38C (100.4F), Mild Pain (1 - 3)  melatonin 3 milliGRAM(s) Oral at bedtime PRN Insomnia  ondansetron Injectable 4 milliGRAM(s) IV Push every 8 hours PRN Nausea and/or Vomiting  sodium chloride 0.65% Nasal 1 Spray(s) Both Nostrils two times a day PRN Nasal Congestion      Allergies    No Known Allergies    Intolerances        Social History:  Tobacco: quit in 1980, not active smoker  EtOH: denies   Recreational drug use: cocaine several years ago "a few times" has not used in "many years"  Lives with: [facility]; group home; no nursing assistance; observation is there  Ambulates: independent, denies walker or cane but uses guard railings  ADLs: independent, but states need for assistance in bathing, cooking; independent with feeding, ambulating  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 No nausea, No vomiting; No diarrhea, No constipation. [  ] BM  GENITOURINARY: No dysuria, No frequency, No urgency, No hematuria, NO incontinence  NEUROLOGICAL: No headaches, No dizziness, No numbness, No tingling, No tremors, No weakness  EXT: No Swelling, No Pain, No Edema  SKIN:  [ x ] No itching, burning, rashes, or lesions   MUSCULOSKELETAL: No joint pain ,No Jt swelling; No muscle pain, No back pain, No extremity pain  PSYCHIATRIC: No depression,  No anxiety,  No mood swings ,No difficulty sleeping at night  PAIN SCALE: [ x ] None  [  ] Other-  ROS Unable to obtain due to - [  ] Dementia  [  ] Lethargy [  ] Drowsy [  ] Sedated [  ] non verbal  REST OF REVIEW Of SYSTEM - [ x ] Normal     Vital Signs Last 24 Hrs  T(C): 36.4 (15 Oct 2021 04:25), Max: 36.7 (14 Oct 2021 17:14)  T(F): 97.5 (15 Oct 2021 04:25), Max: 98.1 (14 Oct 2021 17:14)  HR: 87 (15 Oct 2021 04:25) (70 - 87)  BP: 154/93 (15 Oct 2021 04:25) (106/63 - 166/76)  BP(mean): --  RR: 18 (15 Oct 2021 04:25) (17 - 19)  SpO2: 95% (15 Oct 2021 04:25) (86% - 97%)  Finger Stick        10-14 @ 07:01  -  10-15 @ 07:00  --------------------------------------------------------  IN: 200 mL / OUT: 0 mL / NET: 200 mL        PHYSICAL EXAM:  GENERAL:  [ x ] NAD , [x  ] well appearing, [  ] Agitated, [  ] Drowsy,  [  ] Lethargy, [  ] confused   HEAD:  [ x ] Normal, [  ] Other  EYES:  [x  ] EOMI, [  ] PERRLA, [ x ] conjunctiva and sclera clear normal, [  ] Other,  [  ] Pallor,[  ] Discharge  ENMT:  [ x ] Normal, [ x ] Moist mucous membranes, [  ] Good dentition, [  ] No Thrush  NECK:  [  x] Supple, [  x] No JVD, [  ] Normal thyroid, [  ] Lymphadenopathy [  ] Other  CHEST/LUNG:  [  x] Clear to auscultation bilaterally, [ x ] Breath Sounds equal B/L  [  ] poor effort  [x  ] No rales, [ x ] No rhonchi  [  x]  No wheezing,   HEART:  [ x ] Regular rate and rhythm, [  ] tachycardia, [  ] Bradycardia,  [  ] irregular  [ x ] No murmurs, No rubs, No gallops, [  ] PPM in place (Mfr:  )  ABDOMEN:  [ x ] Soft, [x  ] Nontender, [  ]x Nondistended, [  ] No mass, [  ] Bowel sounds present, [  ] obese  NERVOUS SYSTEM:  [ x ] Alert & Oriented X3, [  ] Nonfocal  [  ] Confusion  [  ] Encephalopathic [  ] Sedated [  ] Unable to assess, [  ] Dementia [  ] Other-  EXTREMITIES: [ x ] 2+ Peripheral Pulses, No clubbing, No cyanosis,  [  ] edema B/L lower EXT. [  ] PVD stasis skin changes B/L Lower EXT, [  ] wound  LYMPH: No lymphadenopathy noted  SKIN:  [x  ] No rashes or lesions, [  ] Pressure Ulcers, [  ] ecchymosis, [  ] Skin Tears, [  ] Other    DIET: Diet, Consistent Carbohydrate Renal w/Evening Snack:   DASH/TLC Sodium & Cholesterol Restricted (10-07-21 @ 02:46)      LABS:                        CARDIAC MARKERS ( 14 Oct 2021 00:59 )  .055 ng/mL / x     / 46 U/L / x     / 1.3 ng/mL  CARDIAC MARKERS ( 13 Oct 2021 19:03 )  .064 ng/mL / x     / 51 U/L / x     / 1.6 ng/mL  CARDIAC MARKERS ( 13 Oct 2021 13:51 )  .063 ng/mL / x     / 51 U/L / x     / 1.2 ng/mL  CARDIAC MARKERS ( 13 Oct 2021 10:41 )  .053 ng/mL / x     / 46 U/L / x     / 1.2 ng/mL  CARDIAC MARKERS ( 13 Oct 2021 07:01 )  .035 ng/mL / x     / 51 U/L / x     / 1.2 ng/mL             Anemia Panel:  Iron Total, Serum: 28 ug/dL (10-08-21 @ 22:27)  Iron - Total Binding Capacity.: 337 ug/dL (10-08-21 @ 22:27)  Vitamin B12, Serum: 413 pg/mL (10-08-21 @ 22:26)  Folate, Serum: 9.9 ng/mL (10-08-21 @ 22:26)  Ferritin, Serum: 28 ng/mL (10-08-21 @ 22:26)  Absolute Reticulocytes: 49.4 K/uL (10-08-21 @ 16:57)      Thyroid Panel:  Thyroid Stimulating Hormone, Serum: 0.89 uIU/mL (10-12-21 @ 07:27)            Immunofixation, Serum:   Weak IgG Kappa Band Identified    Reference Range: None Detected (10-08-21 @ 23:37)  Serum Protein Electrophoresis Interp: Weak Gamma-Migrating Paraprotein Identified (10-08-21 @ 23:37)      RADIOLOGY & ADDITIONAL TESTS:      HEALTH ISSUES - PROBLEM Dx:  PNA (pneumonia)    Hypertensive urgency    Chronic atrial fibrillation    Elevated troponin    Stage 3 chronic kidney disease    Anemia of chronic disease    Chronic diastolic congestive heart failure    Abnormal CT scan of lung    CAD (coronary artery disease)    Depression, major    DM (diabetes mellitus), type 2    DVT prophylaxis    Multiple thyroid nodules            Consultant(s) Notes Reviewed:  [ x ] YES     Care Discussed with [X] Consultants  [x  ] Patient  [  ] Family [  ] HCP [  ]   [  ] Social Service  [  ] RN, [  ] Physical Therapy,[  ] Palliative care team  DVT PPX: [  ] Lovenox, [  ] S C Heparin, [  ] Coumadin, [  ] Xarelto, [ x ] Eliquis, [  ] Pradaxa, [  ] IV Heparin drip, [  ] SCD [  ] Contraindication 2 to GI Bleed,[  ] Ambulation [  ] Contraindicated 2 to  bleed [  ] Contraindicated 2 to Brain Bleed  Advanced directive: [ x ] None, [  ] DNR/DNI Patient is a 76y old  Male who presents with a chief complaint of multilobar PNA, afib (14 Oct 2021 23:49)    HPI:  77 yo M with PMHx of Type 2 DM (not on insulin), BPH, CAD s/p stents >4 years ago (not on plavix), diverticulitis (hospitalized 07/2020 at [facility]), GIB 2/2 diverticulosis (2020), anxiety, Lupus, HTN, HLD, CKD stage 3, HepC, 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 Pt states he would intermittently feel chills and feverish, recorded no temps at group home since last couple of days, he states progressively worsening of dyspnea on exertion and rest, patient unable to walk short distances or climb stairs due to dyspnea, however denies orthopnea, cough, sputum production, night sweats. Patient mentioned some dizziness associated with lower extremities weakness, usually ambulates independently but now feels unable to hold his weight. Regarding palpitations, pt states for 3 days his heart would skip a beat, return to normal. Self limited episodes under a minute, no associated chest pain. Pt also describes urinary frequency beyond baseline, denies dysuria or incontinence, hematuria, constipation.     ED course:   VS: Afebrile, HR: 80 BP: 220/100->235/116-> 189/85, Spo2: 98 on NC RR: 20  Labs significant for low stable hemoglobin, BUN/creatinine: 36/2.30, Trop 0.149, Pro BMP 8061.   EKG on admission showed Afib @ 64 bpm, incompleta right BBB, LVH  CT head shows no  evidence of acute intracranial hemorrhage, midline shift or CT evidence of acute territorial infarct.  CT chest A/P demonstrated Multilobar pneumonia. Mild cardiomegaly. No mall bowel obstruction or active bowel inflammation.  Patient was given in the ED 10 mg IVP hydralazine 10 mg Labetalol IVP 1x, 2000 cc bolus NS 1x      (07 Oct 2021 01:24)    INTERVAL HPI:  10/07/21: Pt seen and examined at bedside; in no acute distress; complains of intermittent palpitation and transient chest pain. On Zosyn 3.375gm q8h + azithromycin 500mg daily; concern for medication compliance; ??capacity. will consult psych. K replaced  10/8/21: Pt seen and examined at bedside. Pt is lethargic and is sleeping although answers with yes and no questions. He has no acute complaints. Denies fevers, chills, chest pain, SOB, abdominal pain, n/v/d/c. On Zosyn 3.375gm q8h + azithromycin 500mg daily. Blood pressure improving. PT recommended LETI yesterday.  10/9/21: Pt seen and examined at bedside. Pt with no complaints this am. Worsening Cr function this am; Nephro on board. Will continue to monitor.   10/10/21: Pt seen and examined at bedside with no complaints this am; On renally dosed zosyn; Add Zithromax, improving renal function this am; pending psych consultation with Dr Winkler.  10/11/21: Patient was seen and examined at bedside. Patient did not want to provide history and wanted to continue sleeping. Patient states he is very sleepy and wants rest. On IV Zosyn /Po Zithromax   10/12/21: Patient was seen and examined at bedside. Switched from IV Zosyn to Augmentin BID x2 days , Replaced K , PT---> HCPT  10/13/21: Patient was seen and examined at bedside. Patient was very drowsy and not able to hold meaningful conversation. Stated he does not have chest pain anymore and feels comfortable. Repeat echo performed, f/u results. 1st set of cardiac enzymes negative. 2nd set of cardiac enzymes - 0.053, 3rd set ordered- Dr. Gamboa aware , follow CK/Tropo at 10 PM & in AM ,   10/14/21: Patient was seen and examined at bedside. States he has back pain because of the hospital bed being uncomfortable and has a slight headache because he wasnt able to sleep well during the night. Patient states he wants to be discharged as soon as possible,  change Clonidine 0.1 mg 3x day ,started on Imdur & procardia XL 30 mg daily  10/15/21: Patient was seen and examined at bedside. Patient was sleeping comfortably in bed. When awoken, states he has no complaints. Patient planned for d/c today. Will receive 1 unit PRBC. Change lasix to 40mg daily starting tomorrow per nephro. Stat H/H, T & S .increase Lasix 40 mg daily as d/w DR Rodarte    OVERNIGHT EVENTS: no acute overnight events    Home Medications:  Eliquis 5 mg oral tablet: 1 tab(s) orally 2 times a day (07 Oct 2021 03:41)  famotidine 40 mg oral tablet: 1 tab(s) orally once a day (at bedtime) (07 Oct 2021 03:41)  furosemide 20 mg oral tablet: 1 tab(s) orally once a day (14 Oct 2021 12:01)  oxybutynin 5 mg oral tablet: 1 tab(s) orally 4 times a day (07 Oct 2021 03:41)  pantoprazole 40 mg oral delayed release tablet: 1 tab(s) orally once a day (before a meal) (07 Oct 2021 03:41)  Symbicort 160 mcg-4.5 mcg/inh inhalation aerosol: 2 puff(s) inhaled 2 times a day (07 Oct 2021 03:41)      MEDICATIONS  (STANDING):  amLODIPine   Tablet 10 milliGRAM(s) Oral daily  apixaban 5 milliGRAM(s) Oral every 12 hours  ARIPiprazole 30 milliGRAM(s) Oral daily  aspirin enteric coated 81 milliGRAM(s) Oral daily  budesonide 160 MICROgram(s)/formoterol 4.5 MICROgram(s) Inhaler 2 Puff(s) Inhalation two times a day  cloNIDine 0.1 milliGRAM(s) Oral three times a day  cyanocobalamin 1000 MICROGram(s) Oral daily  dextrose 40% Gel 15 Gram(s) Oral once  dextrose 5%. 1000 milliLiter(s) (50 mL/Hr) IV Continuous <Continuous>  dextrose 50% Injectable 25 Gram(s) IV Push once  doxazosin 8 milliGRAM(s) Oral at bedtime  famotidine    Tablet 20 milliGRAM(s) Oral every 48 hours  folic acid 1 milliGRAM(s) Oral daily  furosemide    Tablet 20 milliGRAM(s) Oral daily  glucagon  Injectable 1 milliGRAM(s) IntraMuscular once  hydrALAZINE 100 milliGRAM(s) Oral every 8 hours  insulin lispro (ADMELOG) corrective regimen sliding scale   SubCutaneous three times a day before meals  isosorbide   mononitrate ER Tablet (IMDUR) 30 milliGRAM(s) Oral daily  lamoTRIgine 100 milliGRAM(s) Oral daily  mirtazapine 30 milliGRAM(s) Oral at bedtime  NIFEdipine XL 30 milliGRAM(s) Oral daily  nystatin Powder 1 Application(s) Topical two times a day  oxybutynin 5 milliGRAM(s) Oral four times a day  pantoprazole    Tablet 40 milliGRAM(s) Oral before breakfast  venlafaxine XR. 150 milliGRAM(s) Oral daily    MEDICATIONS  (PRN):  acetaminophen   Tablet .. 650 milliGRAM(s) Oral every 6 hours PRN Temp greater or equal to 38C (100.4F), Mild Pain (1 - 3)  melatonin 3 milliGRAM(s) Oral at bedtime PRN Insomnia  ondansetron Injectable 4 milliGRAM(s) IV Push every 8 hours PRN Nausea and/or Vomiting  sodium chloride 0.65% Nasal 1 Spray(s) Both Nostrils two times a day PRN Nasal Congestion      Allergies    No Known Allergies    Intolerances        Social History:  Tobacco: quit in 1980, not active smoker  EtOH: denies   Recreational drug use: cocaine several years ago "a few times" has not used in "many years"  Lives with: [facility]; group home; no nursing assistance; observation is there  Ambulates: independent, denies walker or cane but uses guard railings  ADLs: independent, but states need for assistance in bathing, cooking; independent with feeding, ambulating  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[place]  Vaccinations: Moderna x2 doses last dose in May (07 Oct 2021 01:24)      REVIEW OF SYSTEMS: i am ok  CONSTITUTIONAL: No fever, No chills, No fatigue, No myalgia, No Body ache, No Weakness  EYES: No eye pain,  No visual disturbances, No discharge, NO Redness  ENMT:  No ear pain, No nose bleed, No vertigo; No sinus pain, NO throat pain, No Congestion  NECK: No pain, No stiffness  RESPIRATORY: No cough, NO wheezing, No  hemoptysis, NO  shortness of breath  CARDIOVASCULAR: No chest pain, palpitations  GASTROINTESTINAL: No abdominal pain, NO epigastric pain. No nausea, No vomiting; No diarrhea, No constipation. [  ] BM  GENITOURINARY: No dysuria, No frequency, No urgency, No hematuria, NO incontinence  NEUROLOGICAL: No headaches, No dizziness, No numbness, No tingling, No tremors, No weakness  EXT: No Swelling, No Pain, No Edema  SKIN:  [ x ] No itching, burning, rashes, or lesions   MUSCULOSKELETAL: No joint pain ,No Jt swelling; No muscle pain, No back pain, No extremity pain  PSYCHIATRIC: No depression,  No anxiety,  No mood swings ,No difficulty sleeping at night  PAIN SCALE: [ x ] None  [  ] Other-  ROS Unable to obtain due to - [  ] Dementia  [  ] Lethargy [  ] Drowsy [  ] Sedated [  ] non verbal  REST OF REVIEW Of SYSTEM - [ x ] Normal     Vital Signs Last 24 Hrs  T(C): 36.4 (15 Oct 2021 04:25), Max: 36.7 (14 Oct 2021 17:14)  T(F): 97.5 (15 Oct 2021 04:25), Max: 98.1 (14 Oct 2021 17:14)  HR: 87 (15 Oct 2021 04:25) (70 - 87)  BP: 154/93 (15 Oct 2021 04:25) (106/63 - 166/76)  BP(mean): --  RR: 18 (15 Oct 2021 04:25) (17 - 19)  SpO2: 95% (15 Oct 2021 04:25) (86% - 97%)  Finger Stick        10-14 @ 07:01  -  10-15 @ 07:00  --------------------------------------------------------  IN: 200 mL / OUT: 0 mL / NET: 200 mL        PHYSICAL EXAM:  GENERAL:  [ x ] NAD , [x  ] well appearing, [  ] Agitated, [  ] Drowsy,  [  ] Lethargy, [  ] confused   HEAD:  [ x ] Normal, [  ] Other  EYES:  [x  ] EOMI, [  ] PERRLA, [ x ] conjunctiva and sclera clear normal, [  ] Other,  [  ] Pallor,[  ] Discharge  ENMT:  [ x ] Normal, [ x ] Moist mucous membranes, [  ] Good dentition, [  ] No Thrush  NECK:  [  x] Supple, [  x] No JVD, [  ] Normal thyroid, [  ] Lymphadenopathy [  ] Other  CHEST/LUNG:  [  x] Clear to auscultation bilaterally, [ x ] Breath Sounds equal B/L  [  ] poor effort  [x  ] No rales, [ x ] No rhonchi  [  x]  No wheezing,   HEART:  [ x ] Regular rate and rhythm, [  ] tachycardia, [  ] Bradycardia,  [  ] irregular  [ x ] No murmurs, No rubs, No gallops, [  ] PPM in place (Mfr:  )  ABDOMEN:  [ x ] Soft, [x  ] Nontender, [  ]x Nondistended, [  ] No mass, [  ] Bowel sounds present, [  ] obese  NERVOUS SYSTEM:  [ x ] Alert & Oriented X3, [  ] Nonfocal  [  ] Confusion  [  ] Encephalopathic [  ] Sedated [  ] Unable to assess, [  ] Dementia [  ] Other-  EXTREMITIES: [ x ] 2+ Peripheral Pulses, No clubbing, No cyanosis,  [  ] edema B/L lower EXT. [  ] PVD stasis skin changes B/L Lower EXT, [  ] wound  LYMPH: No lymphadenopathy noted  SKIN:  [x  ] No rashes or lesions, [  ] Pressure Ulcers, [  ] ecchymosis, [  ] Skin Tears, [  ] Other    DIET: Diet, Consistent Carbohydrate Renal w/Evening Snack:   DASH/TLC Sodium & Cholesterol Restricted (10-07-21 @ 02:46)      LABS:                        7.9    x     )-----------( x        ( 15 Oct 2021 14:44 )             26.3   15 Oct 2021 11:24    138    |  107    |  28     ----------------------------<  176    3.9     |  25     |  2.80     Ca    8.9        15 Oct 2021 11:24    TPro  7.0    /  Alb  2.8    /  TBili  0.2    /  DBili  x      /  AST  14     /  ALT  22     /  AlkPhos  31     15 Oct 2021 11:24      CARDIAC MARKERS ( 14 Oct 2021 00:59 )  .055 ng/mL / x     / 46 U/L / x     / 1.3 ng/mL  CARDIAC MARKERS ( 13 Oct 2021 19:03 )  .064 ng/mL / x     / 51 U/L / x     / 1.6 ng/mL  CARDIAC MARKERS ( 13 Oct 2021 13:51 )  .063 ng/mL / x     / 51 U/L / x     / 1.2 ng/mL  CARDIAC MARKERS ( 13 Oct 2021 10:41 )  .053 ng/mL / x     / 46 U/L / x     / 1.2 ng/mL  CARDIAC MARKERS ( 13 Oct 2021 07:01 )  .035 ng/mL / x     / 51 U/L / x     / 1.2 ng/mL      Anemia Panel:  Iron Total, Serum: 28 ug/dL (10-08-21 @ 22:27)  Iron - Total Binding Capacity.: 337 ug/dL (10-08-21 @ 22:27)  Vitamin B12, Serum: 413 pg/mL (10-08-21 @ 22:26)  Folate, Serum: 9.9 ng/mL (10-08-21 @ 22:26)  Ferritin, Serum: 28 ng/mL (10-08-21 @ 22:26)  Absolute Reticulocytes: 49.4 K/uL (10-08-21 @ 16:57)      Thyroid Panel:  Thyroid Stimulating Hormone, Serum: 0.89 uIU/mL (10-12-21 @ 07:27)    Immunofixation, Serum:   Weak IgG Kappa Band Identified    Reference Range: None Detected (10-08-21 @ 23:37)  Serum Protein Electrophoresis Interp: Weak Gamma-Migrating Paraprotein Identified (10-08-21 @ 23:37)      RADIOLOGY & ADDITIONAL TESTS: none      HEALTH ISSUES - PROBLEM Dx:  PNA (pneumonia)    Hypertensive urgency    Chronic atrial fibrillation    Elevated troponin    Stage 3 chronic kidney disease    Anemia of chronic disease    Chronic diastolic congestive heart failure    Abnormal CT scan of lung    CAD (coronary artery disease)    Depression, major    DM (diabetes mellitus), type 2    DVT prophylaxis    Multiple thyroid nodules      Consultant(s) Notes Reviewed:  [ x ] YES     Care Discussed with [X] Consultants  [x  ] Patient  [  ] Family [  ] HCP [  ]   [  ] Social Service  [ x ] RN, [  ] Physical Therapy,[  ] Palliative care team  DVT PPX: [  ] Lovenox, [ x ] S C Heparin, [  ] Coumadin, [  ] Xarelto, [ x ] Eliquis, [  ] Pradaxa, [  ] IV Heparin drip, [  ] SCD [  ] Contraindication 2 to GI Bleed,[  ] Ambulation [  ] Contraindicated 2 to  bleed [  ] Contraindicated 2 to Brain Bleed  Advanced directive: [ x ] None, [  ] DNR/DNI

## 2021-10-15 NOTE — PROGRESS NOTE ADULT - ASSESSMENT
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 Patient admitted for hypertensive urgency, Afib and PNA.

## 2021-10-15 NOTE — PROGRESS NOTE ADULT - PROBLEM SELECTOR PLAN 7
-Patient presenting with worsening dyspnea on rest and exertion   - Appears euvolemic on exam today  - Last TTE on 07/21  Last TTE on 7/21/21 demonstrate normal left ventricular systolic function, estimated LVEF of 60%.LV diastolic dysfunction.  - On admission Pro BNP 8061 elevated compare to previous one on July 4140  - s/p IVF 2 lt bolus in the ED   - On Lasix 20 mg BID, will continue w/ renal function monitor   - Strict I&O's. -Patient presenting with worsening dyspnea on rest and exertion   - Appears euvolemic on exam today  - Last TTE on 07/21  Last TTE on 7/21/21 demonstrate normal left ventricular systolic function, estimated LVEF of 60%.LV diastolic dysfunction.  - On admission Pro BNP 8061 elevated compare to previous one on July 4140  - s/p IVF 2 lt bolus in the ED   - Will start lasix 40mg daily starting tomorrow 10/16  - Strict I&O's.

## 2021-10-15 NOTE — PROGRESS NOTE ADULT - SUBJECTIVE AND OBJECTIVE BOX
Interval History:  feels better  Chart reviewed and events noted;   Overnight events:    MEDICATIONS  (STANDING):  amLODIPine   Tablet 10 milliGRAM(s) Oral daily  apixaban 5 milliGRAM(s) Oral every 12 hours  ARIPiprazole 30 milliGRAM(s) Oral daily  aspirin enteric coated 81 milliGRAM(s) Oral daily  budesonide 160 MICROgram(s)/formoterol 4.5 MICROgram(s) Inhaler 2 Puff(s) Inhalation two times a day  cloNIDine 0.1 milliGRAM(s) Oral three times a day  cyanocobalamin 1000 MICROGram(s) Oral daily  dextrose 40% Gel 15 Gram(s) Oral once  dextrose 5%. 1000 milliLiter(s) (50 mL/Hr) IV Continuous <Continuous>  dextrose 50% Injectable 25 Gram(s) IV Push once  doxazosin 8 milliGRAM(s) Oral at bedtime  famotidine    Tablet 20 milliGRAM(s) Oral every 48 hours  folic acid 1 milliGRAM(s) Oral daily  glucagon  Injectable 1 milliGRAM(s) IntraMuscular once  hydrALAZINE 100 milliGRAM(s) Oral every 8 hours  insulin lispro (ADMELOG) corrective regimen sliding scale   SubCutaneous three times a day before meals  isosorbide   mononitrate ER Tablet (IMDUR) 30 milliGRAM(s) Oral daily  lamoTRIgine 100 milliGRAM(s) Oral daily  mirtazapine 30 milliGRAM(s) Oral at bedtime  NIFEdipine XL 30 milliGRAM(s) Oral daily  nystatin Powder 1 Application(s) Topical two times a day  oxybutynin 5 milliGRAM(s) Oral four times a day  pantoprazole    Tablet 40 milliGRAM(s) Oral before breakfast  venlafaxine XR. 150 milliGRAM(s) Oral daily    MEDICATIONS  (PRN):  acetaminophen   Tablet .. 650 milliGRAM(s) Oral every 6 hours PRN Temp greater or equal to 38C (100.4F), Mild Pain (1 - 3)  melatonin 3 milliGRAM(s) Oral at bedtime PRN Insomnia  ondansetron Injectable 4 milliGRAM(s) IV Push every 8 hours PRN Nausea and/or Vomiting  sodium chloride 0.65% Nasal 1 Spray(s) Both Nostrils two times a day PRN Nasal Congestion      Vital Signs Last 24 Hrs  T(C): 36.5 (15 Oct 2021 21:20), Max: 36.7 (15 Oct 2021 18:17)  T(F): 97.7 (15 Oct 2021 21:20), Max: 98.1 (15 Oct 2021 18:17)  HR: 62 (15 Oct 2021 21:20) (62 - 87)  BP: 148/75 (15 Oct 2021 21:20) (148/75 - 158/77)  BP(mean): --  RR: 18 (15 Oct 2021 21:20) (18 - 18)  SpO2: 91% (15 Oct 2021 21:20) (91% - 95%)    PHYSICAL EXAM  General: adult in NAD  HEENT: clear oropharynx, anicteric sclera, pink conjunctivae  Neck: supple  CV: normal S1S2 with no murmur rubs or gallops  Lungs: clear to auscultation, no wheezes, no rhales  Abdomen: soft non-tender non-distended, no hepato/splenomegaly  Ext: no clubbing cyanosis or edema  Skin: no rashes and no petichiae  Neuro: alert and oriented X3 no focal deficits      LABS:  CBC Full  -  ( 15 Oct 2021 14:44 )  WBC Count : x  RBC Count : x  Hemoglobin : 7.9 g/dL  Hematocrit : 26.3 %  Platelet Count - Automated : x  Mean Cell Volume : x  Mean Cell Hemoglobin : x  Mean Cell Hemoglobin Concentration : x  Auto Neutrophil # : x  Auto Lymphocyte # : x  Auto Monocyte # : x  Auto Eosinophil # : x  Auto Basophil # : x  Auto Neutrophil % : x  Auto Lymphocyte % : x  Auto Monocyte % : x  Auto Eosinophil % : x  Auto Basophil % : x    10-15    138  |  107  |  28<H>  ----------------------------<  176<H>  3.9   |  25  |  2.80<H>    Ca    8.9      15 Oct 2021 11:24    TPro  7.0  /  Alb  2.8<L>  /  TBili  0.2  /  DBili  x   /  AST  14<L>  /  ALT  22  /  AlkPhos  31<L>  10-15        fe studies      WBC trend  5.95 K/uL (10-15-21 @ 11:24)  7.53 K/uL (10-13-21 @ 06:58)      Hgb trend  7.9 g/dL (10-15-21 @ 14:44)  7.9 g/dL (10-15-21 @ 11:24)  8.3 g/dL (10-13-21 @ 06:58)      plt trend  282 K/uL (10-15-21 @ 11:24)  269 K/uL (10-13-21 @ 06:58)        RADIOLOGY & ADDITIONAL STUDIES:

## 2021-10-16 VITALS
DIASTOLIC BLOOD PRESSURE: 80 MMHG | TEMPERATURE: 98 F | HEART RATE: 80 BPM | RESPIRATION RATE: 19 BRPM | OXYGEN SATURATION: 93 % | SYSTOLIC BLOOD PRESSURE: 123 MMHG

## 2021-10-16 LAB
ALBUMIN SERPL ELPH-MCNC: 2.7 G/DL — LOW (ref 3.3–5)
ALP SERPL-CCNC: 39 U/L — LOW (ref 40–120)
ALT FLD-CCNC: 22 U/L — SIGNIFICANT CHANGE UP (ref 12–78)
ANION GAP SERPL CALC-SCNC: 7 MMOL/L — SIGNIFICANT CHANGE UP (ref 5–17)
AST SERPL-CCNC: 13 U/L — LOW (ref 15–37)
BASOPHILS # BLD AUTO: 0.03 K/UL — SIGNIFICANT CHANGE UP (ref 0–0.2)
BASOPHILS NFR BLD AUTO: 0.5 % — SIGNIFICANT CHANGE UP (ref 0–2)
BILIRUB SERPL-MCNC: 0.3 MG/DL — SIGNIFICANT CHANGE UP (ref 0.2–1.2)
BUN SERPL-MCNC: 32 MG/DL — HIGH (ref 7–23)
CALCIUM SERPL-MCNC: 9 MG/DL — SIGNIFICANT CHANGE UP (ref 8.5–10.1)
CHLORIDE SERPL-SCNC: 110 MMOL/L — HIGH (ref 96–108)
CO2 SERPL-SCNC: 23 MMOL/L — SIGNIFICANT CHANGE UP (ref 22–31)
CREAT SERPL-MCNC: 2.5 MG/DL — HIGH (ref 0.5–1.3)
EOSINOPHIL # BLD AUTO: 0.16 K/UL — SIGNIFICANT CHANGE UP (ref 0–0.5)
EOSINOPHIL NFR BLD AUTO: 2.6 % — SIGNIFICANT CHANGE UP (ref 0–6)
GLUCOSE SERPL-MCNC: 116 MG/DL — HIGH (ref 70–99)
HCT VFR BLD CALC: 28.1 % — LOW (ref 39–50)
HGB BLD-MCNC: 8.5 G/DL — LOW (ref 13–17)
IMM GRANULOCYTES NFR BLD AUTO: 0.3 % — SIGNIFICANT CHANGE UP (ref 0–1.5)
LYMPHOCYTES # BLD AUTO: 1.02 K/UL — SIGNIFICANT CHANGE UP (ref 1–3.3)
LYMPHOCYTES # BLD AUTO: 16.7 % — SIGNIFICANT CHANGE UP (ref 13–44)
MCHC RBC-ENTMCNC: 26.1 PG — LOW (ref 27–34)
MCHC RBC-ENTMCNC: 30.2 GM/DL — LOW (ref 32–36)
MCV RBC AUTO: 86.2 FL — SIGNIFICANT CHANGE UP (ref 80–100)
MONOCYTES # BLD AUTO: 0.49 K/UL — SIGNIFICANT CHANGE UP (ref 0–0.9)
MONOCYTES NFR BLD AUTO: 8 % — SIGNIFICANT CHANGE UP (ref 2–14)
NEUTROPHILS # BLD AUTO: 4.37 K/UL — SIGNIFICANT CHANGE UP (ref 1.8–7.4)
NEUTROPHILS NFR BLD AUTO: 71.9 % — SIGNIFICANT CHANGE UP (ref 43–77)
NRBC # BLD: 0 /100 WBCS — SIGNIFICANT CHANGE UP (ref 0–0)
PLATELET # BLD AUTO: 278 K/UL — SIGNIFICANT CHANGE UP (ref 150–400)
POTASSIUM SERPL-MCNC: 3.9 MMOL/L — SIGNIFICANT CHANGE UP (ref 3.5–5.3)
POTASSIUM SERPL-SCNC: 3.9 MMOL/L — SIGNIFICANT CHANGE UP (ref 3.5–5.3)
PROT SERPL-MCNC: 6.7 G/DL — SIGNIFICANT CHANGE UP (ref 6–8.3)
RBC # BLD: 3.26 M/UL — LOW (ref 4.2–5.8)
RBC # FLD: 17.6 % — HIGH (ref 10.3–14.5)
SODIUM SERPL-SCNC: 140 MMOL/L — SIGNIFICANT CHANGE UP (ref 135–145)
WBC # BLD: 6.09 K/UL — SIGNIFICANT CHANGE UP (ref 3.8–10.5)
WBC # FLD AUTO: 6.09 K/UL — SIGNIFICANT CHANGE UP (ref 3.8–10.5)

## 2021-10-16 RX ORDER — FUROSEMIDE 40 MG
1 TABLET ORAL
Qty: 30 | Refills: 0
Start: 2021-10-16 | End: 2021-11-14

## 2021-10-16 RX ORDER — BUDESONIDE AND FORMOTEROL FUMARATE DIHYDRATE 160; 4.5 UG/1; UG/1
2 AEROSOL RESPIRATORY (INHALATION)
Qty: 0 | Refills: 0 | DISCHARGE

## 2021-10-16 RX ORDER — NIFEDIPINE 30 MG
60 TABLET, EXTENDED RELEASE 24 HR ORAL DAILY
Refills: 0 | Status: DISCONTINUED | OUTPATIENT
Start: 2021-10-16 | End: 2021-10-16

## 2021-10-16 RX ORDER — PREGABALIN 225 MG/1
1 CAPSULE ORAL
Qty: 90 | Refills: 0
Start: 2021-10-16 | End: 2022-01-13

## 2021-10-16 RX ORDER — AMLODIPINE BESYLATE 2.5 MG/1
1 TABLET ORAL
Qty: 30 | Refills: 0
Start: 2021-10-16 | End: 2021-11-14

## 2021-10-16 RX ORDER — ASPIRIN/CALCIUM CARB/MAGNESIUM 324 MG
1 TABLET ORAL
Qty: 30 | Refills: 3
Start: 2021-10-16 | End: 2022-02-12

## 2021-10-16 RX ORDER — APIXABAN 2.5 MG/1
1 TABLET, FILM COATED ORAL
Qty: 60 | Refills: 0
Start: 2021-10-16 | End: 2021-11-14

## 2021-10-16 RX ORDER — ATORVASTATIN CALCIUM 80 MG/1
1 TABLET, FILM COATED ORAL
Qty: 3 | Refills: 0
Start: 2021-10-16 | End: 2021-10-18

## 2021-10-16 RX ORDER — NIFEDIPINE 30 MG
1 TABLET, EXTENDED RELEASE 24 HR ORAL
Qty: 3 | Refills: 0
Start: 2021-10-16 | End: 2021-10-18

## 2021-10-16 RX ORDER — APIXABAN 2.5 MG/1
1 TABLET, FILM COATED ORAL
Qty: 0 | Refills: 0 | DISCHARGE

## 2021-10-16 RX ORDER — NIFEDIPINE 30 MG
1 TABLET, EXTENDED RELEASE 24 HR ORAL
Qty: 30 | Refills: 0
Start: 2021-10-16 | End: 2021-11-14

## 2021-10-16 RX ORDER — AMLODIPINE BESYLATE 2.5 MG/1
1 TABLET ORAL
Qty: 3 | Refills: 0
Start: 2021-10-16 | End: 2021-10-18

## 2021-10-16 RX ORDER — ASPIRIN/CALCIUM CARB/MAGNESIUM 324 MG
1 TABLET ORAL
Qty: 3 | Refills: 0
Start: 2021-10-16 | End: 2021-10-18

## 2021-10-16 RX ORDER — FOLIC ACID 0.8 MG
1 TABLET ORAL
Qty: 90 | Refills: 0
Start: 2021-10-16 | End: 2022-01-13

## 2021-10-16 RX ORDER — FAMOTIDINE 10 MG/ML
1 INJECTION INTRAVENOUS
Qty: 0 | Refills: 0 | DISCHARGE

## 2021-10-16 RX ORDER — BUDESONIDE AND FORMOTEROL FUMARATE DIHYDRATE 160; 4.5 UG/1; UG/1
2 AEROSOL RESPIRATORY (INHALATION)
Qty: 120 | Refills: 1
Start: 2021-10-16 | End: 2021-12-14

## 2021-10-16 RX ORDER — ATORVASTATIN CALCIUM 80 MG/1
1 TABLET, FILM COATED ORAL
Qty: 30 | Refills: 1
Start: 2021-10-16 | End: 2021-12-14

## 2021-10-16 RX ORDER — FAMOTIDINE 10 MG/ML
1 INJECTION INTRAVENOUS
Qty: 30 | Refills: 0
Start: 2021-10-16 | End: 2021-11-14

## 2021-10-16 RX ADMIN — ARIPIPRAZOLE 30 MILLIGRAM(S): 15 TABLET ORAL at 12:35

## 2021-10-16 RX ADMIN — APIXABAN 5 MILLIGRAM(S): 2.5 TABLET, FILM COATED ORAL at 05:58

## 2021-10-16 RX ADMIN — ISOSORBIDE MONONITRATE 30 MILLIGRAM(S): 60 TABLET, EXTENDED RELEASE ORAL at 12:38

## 2021-10-16 RX ADMIN — AMLODIPINE BESYLATE 10 MILLIGRAM(S): 2.5 TABLET ORAL at 05:58

## 2021-10-16 RX ADMIN — Medication 100 MILLIGRAM(S): at 15:31

## 2021-10-16 RX ADMIN — Medication 1 MILLIGRAM(S): at 12:36

## 2021-10-16 RX ADMIN — Medication 81 MILLIGRAM(S): at 12:35

## 2021-10-16 RX ADMIN — PANTOPRAZOLE SODIUM 40 MILLIGRAM(S): 20 TABLET, DELAYED RELEASE ORAL at 05:58

## 2021-10-16 RX ADMIN — Medication 0.1 MILLIGRAM(S): at 05:58

## 2021-10-16 RX ADMIN — Medication 0.1 MILLIGRAM(S): at 15:29

## 2021-10-16 RX ADMIN — LAMOTRIGINE 100 MILLIGRAM(S): 25 TABLET, ORALLY DISINTEGRATING ORAL at 12:40

## 2021-10-16 RX ADMIN — Medication 5 MILLIGRAM(S): at 00:03

## 2021-10-16 RX ADMIN — PREGABALIN 1000 MICROGRAM(S): 225 CAPSULE ORAL at 12:35

## 2021-10-16 RX ADMIN — Medication 60 MILLIGRAM(S): at 05:57

## 2021-10-16 RX ADMIN — Medication 150 MILLIGRAM(S): at 12:34

## 2021-10-16 RX ADMIN — Medication 100 MILLIGRAM(S): at 05:59

## 2021-10-16 RX ADMIN — BUDESONIDE AND FORMOTEROL FUMARATE DIHYDRATE 2 PUFF(S): 160; 4.5 AEROSOL RESPIRATORY (INHALATION) at 06:50

## 2021-10-16 RX ADMIN — Medication 40 MILLIGRAM(S): at 05:58

## 2021-10-16 RX ADMIN — NYSTATIN CREAM 1 APPLICATION(S): 100000 CREAM TOPICAL at 06:50

## 2021-10-16 RX ADMIN — Medication 5 MILLIGRAM(S): at 12:40

## 2021-10-16 RX ADMIN — Medication 5 MILLIGRAM(S): at 05:58

## 2021-10-16 NOTE — PROGRESS NOTE ADULT - PROVIDER SPECIALTY LIST ADULT
Cardiology
Heme/Onc
Heme/Onc
Internal Medicine
Nephrology
Cardiology
Cardiology
Infectious Disease
Infectious Disease
Pulmonology
Pulmonology
Cardiology
Cardiology
Heme/Onc
Infectious Disease
Infectious Disease
Nephrology
Pulmonology
Pulmonology
Endocrinology
Heme/Onc
Heme/Onc
Internal Medicine
Nephrology
Pulmonology
Endocrinology
Internal Medicine
Internal Medicine
Hospitalist
Hospitalist
Internal Medicine

## 2021-10-16 NOTE — PROGRESS NOTE ADULT - PROBLEM SELECTOR PLAN 3
On admission /100 --> BP elevated but is improving 2/2 NON Compliance likely.  - Of note patient recently admitted with hypertensive urgency on 07/2021  - Continue clonidine 0.1 TID in the setting of Bradycardia & worsening renal function, as per Renal ARIELLA 2/2 tight control of BP  - On hydralazine 100 mg q 8 hrs + amlodipine 10 mg qd  - nifedipine increased to 60mg by cardio Dr. Gamboa  - continue  Imdur 30mg   - continue Lasix 40mg daily  - Continue to monitor BP. On admission /100 --> BP elevated but is improving 2/2 NON Compliance likely.  - Of note patient recently admitted with hypertensive urgency on 07/2021  - Continue clonidine 0.1 TID in the setting of Bradycardia & worsening renal function, as per Renal ARIELLA 2/2 tight control of BP  - On hydralazine 100 mg q 8 hrs + amlodipine 10 mg qd  - nifedipine XL  increased to 60mg by cardio Dr. Gamboa  - continue  Imdur 30mg   - continue Lasix 40mg daily  - Continue to monitor BP.  -Out pt follow up with Dr Gamboa

## 2021-10-16 NOTE — PROGRESS NOTE ADULT - ATTENDING COMMENTS
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 Patient admitted for hypertensive urgency, Afib and PNA.   pt seen, examined case & care plan d/w pt, residents at detail.  PO diet   -D/W Social work -D/C to Group Home today , Meds also d/w [facility] staff- Quality -[phone]  - All prescriptions/ Meds  reviewed by hospital clinical pharmacist with  pt's out side  Pharmacy,  scripts/Meds from this week Thursday were delivered to group home , Pt was sent home with All remaining needed medications for 3 days of supply which were reviewed with Staff at group [place]. out pt follow ups were d/w group home staff .  -PT---> HCPT  D/C  home Today   -Total  d/c care time 90 minutes.

## 2021-10-16 NOTE — PROGRESS NOTE ADULT - PROBLEM SELECTOR PLAN 1
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 Findings more c/w MGUS or reactive, can be monitored serially as outpatient.  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 Findings more c/w MGUS or reactive, can be monitored serially as outpatient.  - Dr Rodriguez  following- -stable H/H  -Out pt follow up

## 2021-10-16 NOTE — PROGRESS NOTE ADULT - PROBLEM SELECTOR PLAN 6
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 Renal dose Meds.  -  cont Lasix 40mg daily  - Avoid nephrotoxic medications  - BMP in AM   - Dr. 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 Renal dose Meds.  -  cont Lasix 40mg daily  - Avoid nephrotoxic medications  - Dr. Cayden brown following, recs appreciated   -As per Renal  increase Cr is likely 2/2 tight control of BP-  bb d/chio, Lowered dose of Clonidine 2/2 also Bradycardia

## 2021-10-16 NOTE — PROGRESS NOTE ADULT - PROBLEM SELECTOR PLAN 10
- Chronic   - On Mirtazapine, venlafaxin, Lamictal, will continue.  Dr Winkler saw pt , stable for d/c home , pt has capacity to make medical decisions - Chronic   - On Mirtazapine, venlafaxin, Lamictal, will continue.  Dr Winkler saw pt , stable for d/c home , pt has capacity to make medical decisions  Follow up with ACT team as out pt

## 2021-10-16 NOTE — PROGRESS NOTE ADULT - PROBLEM SELECTOR PLAN 11
History of T2DM - HbA1C- 6.3   - last A1C on 07/21 6.1  - Off  meds   - Insulin sliding scale  - Accu checks w/ hypoglycemic protocol. History of T2DM - HbA1C- 6.3   - last A1C on 07/21 6.1  - Off  meds , diet control   - Insulin sliding scale  - Accu checks w/ hypoglycemic protocol.

## 2021-10-16 NOTE — PROGRESS NOTE ADULT - PROBLEM SELECTOR PLAN 9
- overnight complained of chest pain   - trops trended till peaked; likely 2/2 htn    - On ASA continue   - Will d/c fenofibrate in the setting of worsening renal function   - Monitor and replete lytes, keep K>4, Mg>2. - overnight complained of chest pain   - trops trended till peaked; likely 2/2 htn    - On ASA continue , Off Labetalol 2/2 Bradycardia  - Will d/c fenofibrate in the setting of worsening renal function   - Monitor and replete lytes, keep K>4, Mg>2.

## 2021-10-16 NOTE — PROGRESS NOTE ADULT - PROBLEM SELECTOR PLAN 2
Multilobar PNA -improving   - On admission does not meet sepsis criteria; afebrile, WBC WNL, normal lactate   - RIVP negative on admission   - CT Chest showed branching nodular opacities and patchy airspace opacities noted in the lingula and bilateral lower lobes which are likely infectious in etiology. No pleural effusions are present.   - s/p 1g Rocephin and Azithromycin 500 mg daily x 2 days  - s/p Augmentin   - BCx NGTD

## 2021-10-16 NOTE — PROGRESS NOTE ADULT - REASON FOR ADMISSION
multilobar PNA, afib

## 2021-10-16 NOTE — PROGRESS NOTE ADULT - PROBLEM SELECTOR PROBLEM 11
DM (diabetes mellitus), type 2

## 2021-10-16 NOTE — PROGRESS NOTE ADULT - PROBLEM SELECTOR PLAN 7
-Patient presenting with worsening dyspnea on rest and exertion   - Appears euvolemic on exam today  - Last TTE on 07/21  Last TTE on 7/21/21 demonstrate normal left ventricular systolic function, estimated LVEF of 60%.LV diastolic dysfunction.  - On admission Pro BNP 8061 elevated compare to previous one on July 4140  - s/p IVF 2 lt bolus in the ED   - cont  lasix 40mg daily  - Strict I&O's. -Patient presenting with worsening dyspnea on rest and exertion -RESOLVED   - Appears euvolemic on exam today  - Last TTE on 07/21  Last TTE on 7/21/21 demonstrate normal left ventricular systolic function, estimated LVEF of 60%.LV diastolic dysfunction.  - On admission Pro BNP 8061 elevated compare to previous one on July 4140  - cont  lasix 40mg daily on d/c   - Strict I&O's.

## 2021-10-16 NOTE — PROGRESS NOTE ADULT - PROBLEM SELECTOR PLAN 8
Abnormal CT finding   -CT chest on admission showed Multiple heterogeneous thyroid nodules noted bilaterally  - TSH- normal- Recommended to follow up outpatient.  -Endo-Dr C perlman eval done.  · -Recommendation: biochemically euthyroid-thyroid US. as above Abnormal CT finding   -CT chest on admission showed Multiple heterogeneous thyroid nodules noted bilaterally  - TSH- normal- Recommended to follow up outpatient.  -Endo-Dr C perlman eval done.  · -Recommendation: biochemically euthyroid-thyroid US. < from: US Thyroid (10.12.21 @ 10:33) >      Multiple left thyroid nodules, measuring 1.0 cm in the mid pole and 0.7 cm in the upper pole (TI-RAD 3 and 4).    TI-RAD 4: Moderately suspicious (FNA if > 1.5 cm, Follow if > 1 cm)  -D/W Dr C perlman today - out p follow up.

## 2021-10-16 NOTE — PROGRESS NOTE ADULT - PROBLEM SELECTOR PLAN 4
Afib   - P , Afib,- pt's PCP prescribed digoxin, Eliquis by PCP on sept 14/21  - EKG on admission showed Afib @ 64 bpm, incompleta right BBB, LVH  - Last TTE on 7/21/21 demonstrate normal left ventricular systolic function, estimated LVEF of 60%.LV diastolic dysfunction. Repeat echo performed- f/u results.   - d/chio digoxin- level 0.6, s/p Bradycardia & Pauses on Tele   - d/chio labetalol- as Bradycardia & Pauses   - continue with Eliquis 5mg BID   - BMP in am  - Continuous Tele   - Cardiology Dr Gamboa following, f/u recs Afib   - P , Afib,- pt's PCP prescribed digoxin, Eliquis by PCP on sept 14/21  - EKG on admission showed Afib @ 64 bpm, incompleta right BBB, LVH  - Last TTE on 7/21/21 demonstrate normal left ventricular systolic function, estimated LVEF of 60%.LV diastolic dysfunction. Repeat echo performed- f/u results.   - d/chio digoxin- level 0.6, s/p Bradycardia & Pauses on Tele -STOP Dig   - d/chio labetalol- as Bradycardia & Pauses   - continue with Eliquis 5mg BID   - Continuous Tele   - Cardiology Dr Gamboa following, f/u recs

## 2021-10-16 NOTE — PROGRESS NOTE ADULT - PROBLEM SELECTOR PLAN 5
likely demand ischemia in setting of hypertensive urgency / Uncontrolled BP  - Troponin trended to peak   - NOT ACS per cardiology Dr. Gamboa D/W today   -Repeat CK/Tropo x 2 more   - on ASA   - repeat echo- EF 55-60%, mild mr, mild as, mild tr likely demand ischemia in setting of hypertensive urgency / Uncontrolled BP  - Troponin trended to peak   - NOT ACS per cardiology Dr. Gamboa D/W today   -Repeat CK/Tropo x 2 stable   - on ASA   - repeat echo- EF 55-60%, mild mr, mild as, mild tr

## 2021-10-16 NOTE — PROGRESS NOTE ADULT - PROBLEM SELECTOR PROBLEM 10
Depression, major

## 2021-10-16 NOTE — PROGRESS NOTE ADULT - ASSESSMENT
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 Patient admitted for hypertensive urgency, Afib and PNA.

## 2021-10-16 NOTE — PROGRESS NOTE ADULT - SUBJECTIVE AND OBJECTIVE BOX
Interval History:  tolerated transfusion PRBC without complication  Chart reviewed and events noted;   Overnight events:    MEDICATIONS  (STANDING):  amLODIPine   Tablet 10 milliGRAM(s) Oral daily  apixaban 5 milliGRAM(s) Oral every 12 hours  ARIPiprazole 30 milliGRAM(s) Oral daily  aspirin enteric coated 81 milliGRAM(s) Oral daily  budesonide 160 MICROgram(s)/formoterol 4.5 MICROgram(s) Inhaler 2 Puff(s) Inhalation two times a day  cloNIDine 0.1 milliGRAM(s) Oral three times a day  cyanocobalamin 1000 MICROGram(s) Oral daily  dextrose 40% Gel 15 Gram(s) Oral once  dextrose 5%. 1000 milliLiter(s) (50 mL/Hr) IV Continuous <Continuous>  dextrose 50% Injectable 25 Gram(s) IV Push once  doxazosin 8 milliGRAM(s) Oral at bedtime  famotidine    Tablet 20 milliGRAM(s) Oral every 48 hours  folic acid 1 milliGRAM(s) Oral daily  furosemide    Tablet 40 milliGRAM(s) Oral daily  glucagon  Injectable 1 milliGRAM(s) IntraMuscular once  hydrALAZINE 100 milliGRAM(s) Oral every 8 hours  insulin lispro (ADMELOG) corrective regimen sliding scale   SubCutaneous three times a day before meals  isosorbide   mononitrate ER Tablet (IMDUR) 30 milliGRAM(s) Oral daily  lamoTRIgine 100 milliGRAM(s) Oral daily  mirtazapine 30 milliGRAM(s) Oral at bedtime  NIFEdipine XL 60 milliGRAM(s) Oral daily  nystatin Powder 1 Application(s) Topical two times a day  oxybutynin 5 milliGRAM(s) Oral four times a day  pantoprazole    Tablet 40 milliGRAM(s) Oral before breakfast  venlafaxine XR. 150 milliGRAM(s) Oral daily    MEDICATIONS  (PRN):  acetaminophen   Tablet .. 650 milliGRAM(s) Oral every 6 hours PRN Temp greater or equal to 38C (100.4F), Mild Pain (1 - 3)  melatonin 3 milliGRAM(s) Oral at bedtime PRN Insomnia  ondansetron Injectable 4 milliGRAM(s) IV Push every 8 hours PRN Nausea and/or Vomiting  sodium chloride 0.65% Nasal 1 Spray(s) Both Nostrils two times a day PRN Nasal Congestion      Vital Signs Last 24 Hrs  T(C): 36.4 (16 Oct 2021 04:51), Max: 36.7 (15 Oct 2021 18:17)  T(F): 97.5 (16 Oct 2021 04:51), Max: 98.1 (15 Oct 2021 18:17)  HR: 74 (16 Oct 2021 04:51) (62 - 80)  BP: 165/85 (16 Oct 2021 04:51) (148/75 - 165/85)  BP(mean): --  RR: 18 (16 Oct 2021 04:51) (18 - 18)  SpO2: 92% (16 Oct 2021 04:51) (91% - 92%)    PHYSICAL EXAM  General: adult in NAD  HEENT: clear oropharynx, anicteric sclera, pink conjunctivae  Neck: supple  CV: normal S1S2 with no murmur rubs or gallops  Lungs: clear to auscultation, no wheezes, no rhales  Abdomen: soft non-tender non-distended, no hepato/splenomegaly  Ext: no clubbing cyanosis or edema  Skin: no rashes and no petichiae  Neuro: alert and oriented X3 no focal deficits      LABS:  CBC Full  -  ( 16 Oct 2021 08:52 )  WBC Count : 6.09 K/uL  RBC Count : 3.26 M/uL  Hemoglobin : 8.5 g/dL  Hematocrit : 28.1 %  Platelet Count - Automated : 278 K/uL  Mean Cell Volume : 86.2 fl  Mean Cell Hemoglobin : 26.1 pg  Mean Cell Hemoglobin Concentration : 30.2 gm/dL  Auto Neutrophil # : 4.37 K/uL  Auto Lymphocyte # : 1.02 K/uL  Auto Monocyte # : 0.49 K/uL  Auto Eosinophil # : 0.16 K/uL  Auto Basophil # : 0.03 K/uL  Auto Neutrophil % : 71.9 %  Auto Lymphocyte % : 16.7 %  Auto Monocyte % : 8.0 %  Auto Eosinophil % : 2.6 %  Auto Basophil % : 0.5 %    10-16    140  |  110<H>  |  32<H>  ----------------------------<  116<H>  3.9   |  23  |  2.50<H>    Ca    9.0      16 Oct 2021 09:16    TPro  6.7  /  Alb  2.7<L>  /  TBili  0.3  /  DBili  x   /  AST  13<L>  /  ALT  22  /  AlkPhos  39<L>  10-16        fe studies      WBC trend  6.09 K/uL (10-16-21 @ 08:52)  5.95 K/uL (10-15-21 @ 11:24)      Hgb trend  8.5 g/dL (10-16-21 @ 08:52)  7.9 g/dL (10-15-21 @ 14:44)  7.9 g/dL (10-15-21 @ 11:24)      plt trend  278 K/uL (10-16-21 @ 08:52)  282 K/uL (10-15-21 @ 11:24)        RADIOLOGY & ADDITIONAL STUDIES:

## 2021-10-16 NOTE — PROGRESS NOTE ADULT - SUBJECTIVE AND OBJECTIVE BOX
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 Pt states he would intermittently feel chills and feverish, recorded no temps at group home since last couple of days, he states progressively worsening of dyspnea on exertion and rest, patient unable to walk short distances or climb stairs due to dyspnea, however denies orthopnea, cough, sputum production, night sweats. Patient mentioned some dizziness associated with lower extremities weakness, usually ambulates independently but now feels unable to hold his weight. Regarding palpitations, pt states for 3 days his heart would skip a beat, return to normal. Self limited episodes under a minute, no associated chest pain. Pt also describes urinary frequency beyond baseline, denies dysuria or incontinence, hematuria, constipation.     ED course:   VS: Afebrile, HR: 80 BP: 220/100->235/116-> 189/85, Spo2: 98 on NC RR: 20  Labs significant for low stable hemoglobin, BUN/creatinine: 36/2.30, Trop 0.149, Pro BMP 8061.   EKG on admission showed Afib @ 64 bpm, incompleta right BBB, LVH  CT head shows no  evidence of acute intracranial hemorrhage, midline shift or CT evidence of acute territorial infarct.  CT chest A/P demonstrated Multilobar pneumonia. Mild cardiomegaly. No mall bowel obstruction or active bowel inflammation.  Patient was given in the ED 10 mg IVP hydralazine 10 mg Labetalol IVP 1x, 2000 cc bolus NS 1x            (07 Oct 2021 01:24)    INTERVAL HPI:  10/07/21: Pt seen and examined at bedside; in no acute distress; complains of intermittent palpitation and transient chest pain. On Zosyn 3.375gm q8h + azithromycin 500mg daily; concern for medication compliance; ??capacity. will consult psych. K replaced  10/8/21: Pt seen and examined at bedside. Pt is lethargic and is sleeping although answers with yes and no questions. He has no acute complaints. Denies fevers, chills, chest pain, SOB, abdominal pain, n/v/d/c. On Zosyn 3.375gm q8h + azithromycin 500mg daily. Blood pressure improving. PT recommended LETI yesterday.  10/9/21: Pt seen and examined at bedside. Pt with no complaints this am. Worsening Cr function this am; Nephro on board. Will continue to monitor.   10/10/21: Pt seen and examined at bedside with no complaints this am; On renally dosed zosyn; Add Zithromax, improving renal function this am; pending psych consultation with Dr Winkler.  10/11/21: Patient was seen and examined at bedside. Patient did not want to provide history and wanted to continue sleeping. Patient states he is very sleepy and wants rest. On IV Zosyn /Po Zithromax   10/12/21: Patient was seen and examined at bedside. Switched from IV Zosyn to Augmentin BID x2 days , Replaced K , PT---> HCPT  10/13/21: Patient was seen and examined at bedside. Patient was very drowsy and not able to hold meaningful conversation. Stated he does not have chest pain anymore and feels comfortable. Repeat echo performed, f/u results. 1st set of cardiac enzymes negative. 2nd set of cardiac enzymes - 0.053, 3rd set ordered- Dr. Gamboa aware , follow CK/Tropo at 10 PM & in AM ,   10/14/21: Patient was seen and examined at bedside. States he has back pain because of the hospital bed being uncomfortable and has a slight headache because he wasnt able to sleep well during the night. Patient states he wants to be discharged as soon as possible,  change Clonidine 0.1 mg 3x day ,started on Imdur & procardia XL 30 mg daily  10/15/21: Patient was seen and examined at bedside. Patient was sleeping comfortably in bed. When awoken, states he has no complaints. Patient planned for d/c today. Will receive 1 unit PRBC. Change lasix to 40mg daily starting tomorrow per nephro. Stat H/H, T & S .increase Lasix 40 mg daily as d/w DR Rodarte  10/16/21: Patient seen and examined at bedside. Patient sitting up in bed, argumentative with nurse regarding bed alarm, prefers to have no alarm so he can freely ambulate. S/p 1 unit prbc yesterday with good response. Nifedipine increased to 60mg by cardio Dr. Gamboa. Denies any fevers, chills, cp, sob, abdominal pain, diarrhea, constipation. DC planning today.      OVERNIGHT EVENTS: No acute events.    Home Medications:  Eliquis 5 mg oral tablet: 1 tab(s) orally 2 times a day (07 Oct 2021 03:41)  famotidine 40 mg oral tablet: 1 tab(s) orally once a day (at bedtime) (07 Oct 2021 03:41)  furosemide 20 mg oral tablet: 1 tab(s) orally once a day (14 Oct 2021 12:01)  oxybutynin 5 mg oral tablet: 1 tab(s) orally 4 times a day (07 Oct 2021 03:41)  pantoprazole 40 mg oral delayed release tablet: 1 tab(s) orally once a day (before a meal) (07 Oct 2021 03:41)  Symbicort 160 mcg-4.5 mcg/inh inhalation aerosol: 2 puff(s) inhaled 2 times a day (07 Oct 2021 03:41)      MEDICATIONS  (STANDING):  amLODIPine   Tablet 10 milliGRAM(s) Oral daily  apixaban 5 milliGRAM(s) Oral every 12 hours  ARIPiprazole 30 milliGRAM(s) Oral daily  aspirin enteric coated 81 milliGRAM(s) Oral daily  budesonide 160 MICROgram(s)/formoterol 4.5 MICROgram(s) Inhaler 2 Puff(s) Inhalation two times a day  cloNIDine 0.1 milliGRAM(s) Oral three times a day  cyanocobalamin 1000 MICROGram(s) Oral daily  dextrose 40% Gel 15 Gram(s) Oral once  dextrose 5%. 1000 milliLiter(s) (50 mL/Hr) IV Continuous <Continuous>  dextrose 50% Injectable 25 Gram(s) IV Push once  doxazosin 8 milliGRAM(s) Oral at bedtime  famotidine    Tablet 20 milliGRAM(s) Oral every 48 hours  folic acid 1 milliGRAM(s) Oral daily  furosemide    Tablet 40 milliGRAM(s) Oral daily  glucagon  Injectable 1 milliGRAM(s) IntraMuscular once  hydrALAZINE 100 milliGRAM(s) Oral every 8 hours  insulin lispro (ADMELOG) corrective regimen sliding scale   SubCutaneous three times a day before meals  isosorbide   mononitrate ER Tablet (IMDUR) 30 milliGRAM(s) Oral daily  lamoTRIgine 100 milliGRAM(s) Oral daily  mirtazapine 30 milliGRAM(s) Oral at bedtime  NIFEdipine XL 60 milliGRAM(s) Oral daily  nystatin Powder 1 Application(s) Topical two times a day  oxybutynin 5 milliGRAM(s) Oral four times a day  pantoprazole    Tablet 40 milliGRAM(s) Oral before breakfast  venlafaxine XR. 150 milliGRAM(s) Oral daily    MEDICATIONS  (PRN):  acetaminophen   Tablet .. 650 milliGRAM(s) Oral every 6 hours PRN Temp greater or equal to 38C (100.4F), Mild Pain (1 - 3)  melatonin 3 milliGRAM(s) Oral at bedtime PRN Insomnia  ondansetron Injectable 4 milliGRAM(s) IV Push every 8 hours PRN Nausea and/or Vomiting  sodium chloride 0.65% Nasal 1 Spray(s) Both Nostrils two times a day PRN Nasal Congestion      Allergies    No Known Allergies    Intolerances        Social History:  Tobacco: quit in 1980, not active smoker  EtOH: denies   Recreational drug use: cocaine several years ago "a few times" has not used in "many years"  Lives with: [facility]; group home; no nursing assistance; observation is there  Ambulates: independent, denies walker or cane but uses guard railings  ADLs: independent, but states need for assistance in bathing, cooking; independent with feeding, ambulating  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[place]  Vaccinations: Moderna x2 doses last dose in May (07 Oct 2021 01:24)      REVIEW OF SYSTEMS: I am Ok.  CONSTITUTIONAL: No fever, No chills, No fatigue, No myalgia, No Body ache, No Weakness  EYES: No eye pain,  No visual disturbances, No discharge, NO Redness  ENMT:  No ear pain, No nose bleed, No vertigo; No sinus pain, NO throat pain, No Congestion  NECK: No pain, No stiffness  RESPIRATORY: No cough, NO wheezing, No  hemoptysis, NO  shortness of breath  CARDIOVASCULAR: No chest pain, palpitations  GASTROINTESTINAL: No abdominal pain, NO epigastric pain. No nausea, No vomiting; No diarrhea, No constipation. [  ] BM  GENITOURINARY: No dysuria, No frequency, No urgency, No hematuria, NO incontinence  NEUROLOGICAL: No headaches, No dizziness, No numbness, No tingling, No tremors, No weakness  EXT: No Swelling, No Pain, No Edema  SKIN:  [ x ] No itching, burning, rashes, or lesions   MUSCULOSKELETAL: No joint pain ,No Jt swelling; No muscle pain, No back pain, No extremity pain  PSYCHIATRIC: No depression,  No anxiety,  No mood swings ,No difficulty sleeping at night  PAIN SCALE: [ x ] None  [  ] Other-  ROS Unable to obtain due to - [  ] Dementia  [  ] Lethargy [  ] Drowsy [  ] Sedated [  ] non verbal  REST OF REVIEW Of SYSTEM - [ x ] Normal    Vital Signs Last 24 Hrs  T(C): 36.4 (16 Oct 2021 04:51), Max: 36.7 (15 Oct 2021 18:17)  T(F): 97.5 (16 Oct 2021 04:51), Max: 98.1 (15 Oct 2021 18:17)  HR: 74 (16 Oct 2021 04:51) (62 - 80)  BP: 165/85 (16 Oct 2021 04:51) (148/75 - 165/85)  BP(mean): --  RR: 18 (16 Oct 2021 04:51) (18 - 18)  SpO2: 92% (16 Oct 2021 04:51) (91% - 92%)  Finger Stick        10-15 @ 07:01  -  10-16 @ 07:00  --------------------------------------------------------  IN: 720 mL / OUT: 150 mL / NET: 570 mL        PHYSICAL EXAM:  GENERAL:  [ x ] NAD , [x  ] well appearing, [  ] Agitated, [  ] Drowsy,  [  ] Lethargy, [  ] confused   HEAD:  [ x ] Normal, [  ] Other  EYES:  [x  ] EOMI, [  ] PERRLA, [ x ] conjunctiva and sclera clear normal, [  ] Other,  [  ] Pallor,[  ] Discharge  ENMT:  [ x ] Normal, [ x ] Moist mucous membranes, [  ] Good dentition, [  ] No Thrush  NECK:  [  x] Supple, [  x] No JVD, [  ] Normal thyroid, [  ] Lymphadenopathy [  ] Other  CHEST/LUNG:  [  x] Clear to auscultation bilaterally, [ x ] Breath Sounds equal B/L  [  ] poor effort  [x  ] No rales, [ x ] No rhonchi  [  x]  No wheezing,   HEART:  [ x ] Regular rate and rhythm, [  ] tachycardia, [  ] Bradycardia,  [  ] irregular  [ x ] No murmurs, No rubs, No gallops, [  ] PPM in place (Mfr:  )  ABDOMEN:  [ x ] Soft, [x  ] Nontender, [  ]x Nondistended, [  ] No mass, [  ] Bowel sounds present, [  ] obese  NERVOUS SYSTEM:  [ x ] Alert & Oriented X3, [  ] Nonfocal  [  ] Confusion  [  ] Encephalopathic [  ] Sedated [  ] Unable to assess, [  ] Dementia [  ] Other-  EXTREMITIES: [ x ] 2+ Peripheral Pulses, No clubbing, No cyanosis,  [  ] edema B/L lower EXT. [  ] PVD stasis skin changes B/L Lower EXT, [  ] wound  LYMPH: No lymphadenopathy noted  SKIN:  [x  ] No rashes or lesions, [  ] Pressure Ulcers, [  ] ecchymosis, [  ] Skin Tears, [  ] Other      DIET: Diet, Consistent Carbohydrate Renal w/Evening Snack:   DASH/TLC Sodium & Cholesterol Restricted (10-07-21 @ 02:46)      LABS:                        8.5    6.09  )-----------( 278      ( 16 Oct 2021 08:52 )             28.1     16 Oct 2021 09:16    x      |  x      |  32     ----------------------------<  116    x       |  23     |  2.50     Ca    9.0        16 Oct 2021 09:16    TPro  x      /  Alb  2.7    /  TBili  x      /  DBili  x      /  AST  13     /  ALT  x      /  AlkPhos  x      16 Oct 2021 09:16                  CARDIAC MARKERS ( 14 Oct 2021 00:59 )  .055 ng/mL / x     / 46 U/L / x     / 1.3 ng/mL  CARDIAC MARKERS ( 13 Oct 2021 19:03 )  .064 ng/mL / x     / 51 U/L / x     / 1.6 ng/mL  CARDIAC MARKERS ( 13 Oct 2021 13:51 )  .063 ng/mL / x     / 51 U/L / x     / 1.2 ng/mL  CARDIAC MARKERS ( 13 Oct 2021 10:41 )  .053 ng/mL / x     / 46 U/L / x     / 1.2 ng/mL  CARDIAC MARKERS ( 13 Oct 2021 07:01 )  .035 ng/mL / x     / 51 U/L / x     / 1.2 ng/mL             Anemia Panel:      Thyroid Panel:  Thyroid Stimulating Hormone, Serum: 0.89 uIU/mL (10-12-21 @ 07:27)                RADIOLOGY & ADDITIONAL TESTS:      HEALTH ISSUES - PROBLEM Dx:  PNA (pneumonia)    Elevated troponin    Chronic atrial fibrillation    Stage 3 chronic kidney disease    Chronic diastolic congestive heart failure    CAD (coronary artery disease)    DVT prophylaxis    Depression, major    Anemia of chronic disease    DM (diabetes mellitus), type 2    Abnormal CT scan of lung    Hypertensive urgency    Multiple thyroid nodules            Consultant(s) Notes Reviewed:  [  ] YES     Care Discussed with [X] Consultants  [  ] Patient  [  ] Family [  ] HCP [  ]   [  ] Social Service  [  ] RN, [  ] Physical Therapy,[  ] Palliative care team  DVT PPX: [  ] Lovenox, [  ] S C Heparin, [  ] Coumadin, [  ] Xarelto, [  ] Eliquis, [  ] Pradaxa, [  ] IV Heparin drip, [  ] SCD [  ] Contraindication 2 to GI Bleed,[  ] Ambulation [  ] Contraindicated 2 to  bleed [  ] Contraindicated 2 to Brain Bleed  Advanced directive: [  ] None, [  ] DNR/DNI Patient is a 76y old  Male who presents with a chief complaint of multilobar PNA, afib (15 Oct 2021 22:59)    HPI:  77 yo M with PMHx of Type 2 DM (not on insulin), BPH, CAD s/p stents >4 years ago (not on plavix), diverticulitis (hospitalized 07/2020 at [facility]), GIB 2/2 diverticulosis (2020), anxiety, Lupus, HTN, HLD, CKD stage 3, HepC, 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 Pt states he would intermittently feel chills and feverish, recorded no temps at group home since last couple of days, he states progressively worsening of dyspnea on exertion and rest, patient unable to walk short distances or climb stairs due to dyspnea, however denies orthopnea, cough, sputum production, night sweats. Patient mentioned some dizziness associated with lower extremities weakness, usually ambulates independently but now feels unable to hold his weight. Regarding palpitations, pt states for 3 days his heart would skip a beat, return to normal. Self limited episodes under a minute, no associated chest pain. Pt also describes urinary frequency beyond baseline, denies dysuria or incontinence, hematuria, constipation.     ED course:   VS: Afebrile, HR: 80 BP: 220/100->235/116-> 189/85, Spo2: 98 on NC RR: 20  Labs significant for low stable hemoglobin, BUN/creatinine: 36/2.30, Trop 0.149, Pro BMP 8061.   EKG on admission showed Afib @ 64 bpm, incompleta right BBB, LVH  CT head shows no  evidence of acute intracranial hemorrhage, midline shift or CT evidence of acute territorial infarct.  CT chest A/P demonstrated Multilobar pneumonia. Mild cardiomegaly. No mall bowel obstruction or active bowel inflammation.  Patient was given in the ED 10 mg IVP hydralazine 10 mg Labetalol IVP 1x, 2000 cc bolus NS 1x            (07 Oct 2021 01:24)    INTERVAL HPI:  10/07/21: Pt seen and examined at bedside; in no acute distress; complains of intermittent palpitation and transient chest pain. On Zosyn 3.375gm q8h + azithromycin 500mg daily; concern for medication compliance; ??capacity. will consult psych. K replaced  10/8/21: Pt seen and examined at bedside. Pt is lethargic and is sleeping although answers with yes and no questions. He has no acute complaints. Denies fevers, chills, chest pain, SOB, abdominal pain, n/v/d/c. On Zosyn 3.375gm q8h + azithromycin 500mg daily. Blood pressure improving. PT recommended LETI yesterday.  10/9/21: Pt seen and examined at bedside. Pt with no complaints this am. Worsening Cr function this am; Nephro on board. Will continue to monitor.   10/10/21: Pt seen and examined at bedside with no complaints this am; On renally dosed zosyn; Add Zithromax, improving renal function this am; pending psych consultation with Dr Winkler.  10/11/21: Patient was seen and examined at bedside. Patient did not want to provide history and wanted to continue sleeping. Patient states he is very sleepy and wants rest. On IV Zosyn /Po Zithromax   10/12/21: Patient was seen and examined at bedside. Switched from IV Zosyn to Augmentin BID x2 days , Replaced K , PT---> HCPT  10/13/21: Patient was seen and examined at bedside. Patient was very drowsy and not able to hold meaningful conversation. Stated he does not have chest pain anymore and feels comfortable. Repeat echo performed, f/u results. 1st set of cardiac enzymes negative. 2nd set of cardiac enzymes - 0.053, 3rd set ordered- Dr. Gamoba aware , follow CK/Tropo at 10 PM & in AM ,   10/14/21: Patient was seen and examined at bedside. States he has back pain because of the hospital bed being uncomfortable and has a slight headache because he wasnt able to sleep well during the night. Patient states he wants to be discharged as soon as possible,  change Clonidine 0.1 mg 3x day ,started on Imdur & procardia XL 30 mg daily  10/15/21: Patient was seen and examined at bedside. Patient was sleeping comfortably in bed. When awoken, states he has no complaints. Patient planned for d/c today. Will receive 1 unit PRBC. Change lasix to 40mg daily starting tomorrow per nephro. Stat H/H, T & S .increase Lasix 40 mg daily as d/w DR Rodarte  10/16/21: Patient seen and examined at bedside. Patient sitting up in bed, argumentative with nurse regarding bed alarm, prefers to have no alarm so he can freely ambulate. S/p 1 unit prbc yesterday with good response. Nifedipine increased to 60mg by cardio Dr. Gamboa. Denies any fevers, chills, cp, sob, abdominal pain, diarrhea, constipation. D/C planning today. HCPT arranged       OVERNIGHT EVENTS: No acute events.    Home Medications:  Eliquis 5 mg oral tablet: 1 tab(s) orally 2 times a day (07 Oct 2021 03:41)  famotidine 40 mg oral tablet: 1 tab(s) orally once a day (at bedtime) (07 Oct 2021 03:41)  furosemide 20 mg oral tablet: 1 tab(s) orally once a day (14 Oct 2021 12:01)  oxybutynin 5 mg oral tablet: 1 tab(s) orally 4 times a day (07 Oct 2021 03:41)  pantoprazole 40 mg oral delayed release tablet: 1 tab(s) orally once a day (before a meal) (07 Oct 2021 03:41)  Symbicort 160 mcg-4.5 mcg/inh inhalation aerosol: 2 puff(s) inhaled 2 times a day (07 Oct 2021 03:41)      MEDICATIONS  (STANDING):  amLODIPine   Tablet 10 milliGRAM(s) Oral daily  apixaban 5 milliGRAM(s) Oral every 12 hours  ARIPiprazole 30 milliGRAM(s) Oral daily  aspirin enteric coated 81 milliGRAM(s) Oral daily  budesonide 160 MICROgram(s)/formoterol 4.5 MICROgram(s) Inhaler 2 Puff(s) Inhalation two times a day  cloNIDine 0.1 milliGRAM(s) Oral three times a day  cyanocobalamin 1000 MICROGram(s) Oral daily  dextrose 40% Gel 15 Gram(s) Oral once  dextrose 5%. 1000 milliLiter(s) (50 mL/Hr) IV Continuous <Continuous>  dextrose 50% Injectable 25 Gram(s) IV Push once  doxazosin 8 milliGRAM(s) Oral at bedtime  famotidine    Tablet 20 milliGRAM(s) Oral every 48 hours  folic acid 1 milliGRAM(s) Oral daily  furosemide    Tablet 40 milliGRAM(s) Oral daily  glucagon  Injectable 1 milliGRAM(s) IntraMuscular once  hydrALAZINE 100 milliGRAM(s) Oral every 8 hours  insulin lispro (ADMELOG) corrective regimen sliding scale   SubCutaneous three times a day before meals  isosorbide   mononitrate ER Tablet (IMDUR) 30 milliGRAM(s) Oral daily  lamoTRIgine 100 milliGRAM(s) Oral daily  mirtazapine 30 milliGRAM(s) Oral at bedtime  NIFEdipine XL 60 milliGRAM(s) Oral daily  nystatin Powder 1 Application(s) Topical two times a day  oxybutynin 5 milliGRAM(s) Oral four times a day  pantoprazole    Tablet 40 milliGRAM(s) Oral before breakfast  venlafaxine XR. 150 milliGRAM(s) Oral daily    MEDICATIONS  (PRN):  acetaminophen   Tablet .. 650 milliGRAM(s) Oral every 6 hours PRN Temp greater or equal to 38C (100.4F), Mild Pain (1 - 3)  melatonin 3 milliGRAM(s) Oral at bedtime PRN Insomnia  ondansetron Injectable 4 milliGRAM(s) IV Push every 8 hours PRN Nausea and/or Vomiting  sodium chloride 0.65% Nasal 1 Spray(s) Both Nostrils two times a day PRN Nasal Congestion      Allergies    No Known Allergies    Intolerances        Social History:  Tobacco: quit in 1980, not active smoker  EtOH: denies   Recreational drug use: cocaine several years ago "a few times" has not used in "many years"  Lives with: [facility]; group home; no nursing assistance; observation is there  Ambulates: independent, denies walker or cane but uses guard railings  ADLs: independent, but states need for assistance in bathing, cooking; independent with feeding, ambulating  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  CONSTITUTIONAL: No fever, No chills, No fatigue, No myalgia, No Body ache, No Weakness  EYES: No eye pain,  No visual disturbances, No discharge, NO Redness  ENMT:  No ear pain, No nose bleed, No vertigo; No sinus pain, NO throat pain, No Congestion  NECK: No pain, No stiffness  RESPIRATORY: No cough, NO wheezing, No  hemoptysis, NO  shortness of breath  CARDIOVASCULAR: No chest pain, palpitations  GASTROINTESTINAL: No abdominal pain, NO epigastric pain. No nausea, No vomiting; No diarrhea, No constipation. [  ] BM  GENITOURINARY: No dysuria, No frequency, No urgency, No hematuria, NO incontinence  NEUROLOGICAL: No headaches, No dizziness, No numbness, No tingling, No tremors, No weakness  EXT: No Swelling, No Pain, No Edema  SKIN:  [ x ] No itching, burning, rashes, or lesions   MUSCULOSKELETAL: No joint pain ,No Jt swelling; No muscle pain, No back pain, No extremity pain  PSYCHIATRIC: No depression,  No anxiety,  No mood swings ,No difficulty sleeping at night  PAIN SCALE: [ x ] None  [  ] Other-  ROS Unable to obtain due to - [  ] Dementia  [  ] Lethargy [  ] Drowsy [  ] Sedated [  ] non verbal  REST OF REVIEW Of SYSTEM - [ x ] Normal    Vital Signs Last 24 Hrs  T(C): 36.4 (16 Oct 2021 04:51), Max: 36.7 (15 Oct 2021 18:17)  T(F): 97.5 (16 Oct 2021 04:51), Max: 98.1 (15 Oct 2021 18:17)  HR: 74 (16 Oct 2021 04:51) (62 - 80)  BP: 165/85 (16 Oct 2021 04:51) (148/75 - 165/85)  BP(mean): --  RR: 18 (16 Oct 2021 04:51) (18 - 18)  SpO2: 92% (16 Oct 2021 04:51) (91% - 92%)  Finger Stick        10-15 @ 07:01  -  10-16 @ 07:00  --------------------------------------------------------  IN: 720 mL / OUT: 150 mL / NET: 570 mL        PHYSICAL EXAM:  GENERAL:  [ x ] NAD , [x  ] well appearing, [  ] Agitated, [  ] Drowsy,  [  ] Lethargy, [  ] confused   HEAD:  [ x ] Normal, [  ] Other  EYES:  [x  ] EOMI, [x  ] PERRLA, [ x ] conjunctiva and sclera clear normal, [  ] Other,  [  ] Pallor,[  ] Discharge  ENMT:  [ x ] Normal, [ x ] Moist mucous membranes, [ x ] Good dentition, [ x ] No Thrush  NECK:  [  x] Supple, [  x] No JVD, [ x ] Normal thyroid, [  ] Lymphadenopathy [  ] Other  CHEST/LUNG:  [  x] Clear to auscultation bilaterally, [ x ] Breath Sounds equal B/L  [  ] poor effort  [x  ] No rales, [ x ] No rhonchi  [  x]  No wheezing,   HEART:  [ x ] Regular rate and rhythm, [  ] tachycardia, [  ] Bradycardia,  [  ] irregular  [ x ] No murmurs, No rubs, No gallops, [  ] PPM in place (Mfr:  )  ABDOMEN:  [ x ] Soft, [x  ] Nontender, [  ]x Nondistended, [ x ] No mass, [x  ] Bowel sounds present, [x  ] obese  NERVOUS SYSTEM:  [ x ] Alert & Oriented X3, [ x ] Nonfocal  [  ] Confusion  [  ] Encephalopathic [  ] Sedated [  ] Unable to assess, [  ] Dementia [  ] Other-  EXTREMITIES: [ x ] 2+ Peripheral Pulses, No clubbing, No cyanosis,  [x  ] 2+ pitting  edema B/L lower EXT. [  ] PVD stasis skin changes B/L Lower EXT, [  ] wound  LYMPH: No lymphadenopathy noted  SKIN:  [x  ] No rashes or lesions, [  ] Pressure Ulcers, [  ] ecchymosis, [  ] Skin Tears, [x  ] Other-Tattoos on skin      DIET: Diet, Consistent Carbohydrate Renal w/Evening Snack:   DASH/TLC Sodium & Cholesterol Restricted (10-07-21 @ 02:46)      LABS:                        8.5    6.09  )-----------( 278      ( 16 Oct 2021 08:52 )             28.1     16 Oct 2021 09:16    16 Oct 2021 09:16    140    |  110    |  32     ----------------------------<  116    3.9     |  23     |  2.50     Ca    9.0        16 Oct 2021 09:16    TPro  6.7    /  Alb  2.7    /  TBili  0.3    /  DBili  x      /  AST  13     /  ALT  22     /  AlkPhos  39     16 Oct 2021 09:16      x      |  x      |  32     ----------------------------<  116    x       |  23     |  2.50     Ca    9.0        16 Oct 2021 09:16    TPro  x      /  Alb  2.7    /  TBili  x      /  DBili  x      /  AST  13     /  ALT  x      /  AlkPhos  x      16 Oct 2021 09:16          CARDIAC MARKERS ( 14 Oct 2021 00:59 )  .055 ng/mL / x     / 46 U/L / x     / 1.3 ng/mL  CARDIAC MARKERS ( 13 Oct 2021 19:03 )  .064 ng/mL / x     / 51 U/L / x     / 1.6 ng/mL  CARDIAC MARKERS ( 13 Oct 2021 13:51 )  .063 ng/mL / x     / 51 U/L / x     / 1.2 ng/mL  CARDIAC MARKERS ( 13 Oct 2021 10:41 )  .053 ng/mL / x     / 46 U/L / x     / 1.2 ng/mL  CARDIAC MARKERS ( 13 Oct 2021 07:01 )  .035 ng/mL / x     / 51 U/L / x     / 1.2 ng/mL             Anemia Panel:      Thyroid Panel:  Thyroid Stimulating Hormone, Serum: 0.89 uIU/mL (10-12-21 @ 07:27)    RADIOLOGY & ADDITIONAL TESTS:  NONE    HEALTH ISSUES - PROBLEM Dx:  PNA (pneumonia)    Elevated troponin    Chronic atrial fibrillation    Stage 3 chronic kidney disease    Chronic diastolic congestive heart failure    CAD (coronary artery disease)    DVT prophylaxis    Depression, major    Anemia of chronic disease    DM (diabetes mellitus), type 2    Abnormal CT scan of lung    Hypertensive urgency    Multiple thyroid nodules            Consultant(s) Notes Reviewed:  [ x ] YES     Care Discussed with [X] Consultants  [ x ] Patient  [  ] Family [  ] HCP [ x ]   [ x ] Social Service  [x  ] RN, [  ] Physical Therapy,[  ] Palliative care team  DVT PPX: [  ] Lovenox, [  ] S C Heparin, [  ] Coumadin, [  ] Xarelto, [ x ] Eliquis, [  ] Pradaxa, [  ] IV Heparin drip, [  ] SCD [  ] Contraindication 2 to GI Bleed,[  ] Ambulation [  ] Contraindicated 2 to  bleed [  ] Contraindicated 2 to Brain Bleed  Advanced directive: [ x ] None, [  ] DNR/DNI

## 2021-10-16 NOTE — PROGRESS NOTE ADULT - ASSESSMENT
[ASSESSMENT and  PLAN]  77yo M admitted with HTN urgency,  /100 and multilobar pneumonia; history of GI bleed with diverticulosis; also noted to have renal insufficiency  Empirically started on oral B12 and folate; but levels replete and stopped  Iron studies c/w iron deficiency; started on IV iron therapy  Remains on  Apixiban 5mg q12h for Afib    - serum immunofixation with weak IgG-kappa, normal IgG/A/M, both kappa and lambda elevated and k/l ratio sl elevated. Findings more c/w MGUS or reactive, can be monitored serially as outpatient  - Hgb sl lower but essentially stable, no signs of active bleeds, continue serial monitor  hgb improved after transfusion  hematologically stable for discharge

## 2021-10-16 NOTE — PROGRESS NOTE ADULT - ATTENDING SUPERVISION STATEMENT
ACP/Resident

## 2021-10-16 NOTE — PROGRESS NOTE ADULT - PROBLEM SELECTOR PROBLEM 8
Abnormal CT scan of lung

## 2021-10-18 ENCOUNTER — INPATIENT (INPATIENT)
Facility: HOSPITAL | Age: 76
LOS: 10 days | Discharge: NURSING, REHAB OR LONG-TERM CARE | DRG: 291 | End: 2021-10-29
Attending: INTERNAL MEDICINE | Admitting: INTERNAL MEDICINE
Payer: MEDICARE

## 2021-10-18 VITALS
RESPIRATION RATE: 17 BRPM | TEMPERATURE: 98 F | HEIGHT: 68 IN | SYSTOLIC BLOOD PRESSURE: 210 MMHG | WEIGHT: 225.09 LBS | HEART RATE: 79 BPM | OXYGEN SATURATION: 100 % | DIASTOLIC BLOOD PRESSURE: 96 MMHG

## 2021-10-18 DIAGNOSIS — R77.8 OTHER SPECIFIED ABNORMALITIES OF PLASMA PROTEINS: ICD-10-CM

## 2021-10-18 DIAGNOSIS — L05.91 PILONIDAL CYST WITHOUT ABSCESS: Chronic | ICD-10-CM

## 2021-10-18 DIAGNOSIS — F41.8 OTHER SPECIFIED ANXIETY DISORDERS: ICD-10-CM

## 2021-10-18 DIAGNOSIS — Z29.9 ENCOUNTER FOR PROPHYLACTIC MEASURES, UNSPECIFIED: ICD-10-CM

## 2021-10-18 DIAGNOSIS — I66.9 OCCLUSION AND STENOSIS OF UNSPECIFIED CEREBRAL ARTERY: Chronic | ICD-10-CM

## 2021-10-18 DIAGNOSIS — I48.91 UNSPECIFIED ATRIAL FIBRILLATION: ICD-10-CM

## 2021-10-18 DIAGNOSIS — Z98.89 OTHER SPECIFIED POSTPROCEDURAL STATES: Chronic | ICD-10-CM

## 2021-10-18 DIAGNOSIS — I25.10 ATHEROSCLEROTIC HEART DISEASE OF NATIVE CORONARY ARTERY WITHOUT ANGINA PECTORIS: ICD-10-CM

## 2021-10-18 DIAGNOSIS — N18.30 CHRONIC KIDNEY DISEASE, STAGE 3 UNSPECIFIED: ICD-10-CM

## 2021-10-18 DIAGNOSIS — I50.9 HEART FAILURE, UNSPECIFIED: ICD-10-CM

## 2021-10-18 DIAGNOSIS — I16.0 HYPERTENSIVE URGENCY: ICD-10-CM

## 2021-10-18 DIAGNOSIS — F41.9 ANXIETY DISORDER, UNSPECIFIED: ICD-10-CM

## 2021-10-18 DIAGNOSIS — D64.9 ANEMIA, UNSPECIFIED: ICD-10-CM

## 2021-10-18 DIAGNOSIS — J18.9 PNEUMONIA, UNSPECIFIED ORGANISM: ICD-10-CM

## 2021-10-18 DIAGNOSIS — E11.9 TYPE 2 DIABETES MELLITUS WITHOUT COMPLICATIONS: ICD-10-CM

## 2021-10-18 DIAGNOSIS — Z98.49 CATARACT EXTRACTION STATUS, UNSPECIFIED EYE: Chronic | ICD-10-CM

## 2021-10-18 DIAGNOSIS — R06.03 ACUTE RESPIRATORY DISTRESS: ICD-10-CM

## 2021-10-18 DIAGNOSIS — E78.5 HYPERLIPIDEMIA, UNSPECIFIED: ICD-10-CM

## 2021-10-18 DIAGNOSIS — N44.00 TORSION OF TESTIS, UNSPECIFIED: Chronic | ICD-10-CM

## 2021-10-18 PROBLEM — K57.90 DIVERTICULOSIS OF INTESTINE, PART UNSPECIFIED, WITHOUT PERFORATION OR ABSCESS WITHOUT BLEEDING: Chronic | Status: ACTIVE | Noted: 2021-10-07

## 2021-10-18 PROBLEM — I10 ESSENTIAL (PRIMARY) HYPERTENSION: Chronic | Status: ACTIVE | Noted: 2021-10-07

## 2021-10-18 PROBLEM — Z87.19 PERSONAL HISTORY OF OTHER DISEASES OF THE DIGESTIVE SYSTEM: Chronic | Status: ACTIVE | Noted: 2021-10-07

## 2021-10-18 LAB
ALBUMIN SERPL ELPH-MCNC: 3.1 G/DL — LOW (ref 3.3–5)
ALP SERPL-CCNC: 50 U/L — SIGNIFICANT CHANGE UP (ref 40–120)
ALT FLD-CCNC: 24 U/L — SIGNIFICANT CHANGE UP (ref 12–78)
ANION GAP SERPL CALC-SCNC: 7 MMOL/L — SIGNIFICANT CHANGE UP (ref 5–17)
APPEARANCE UR: CLEAR — SIGNIFICANT CHANGE UP
APTT BLD: 43 SEC — HIGH (ref 27.5–35.5)
AST SERPL-CCNC: 19 U/L — SIGNIFICANT CHANGE UP (ref 15–37)
BASOPHILS # BLD AUTO: 0.03 K/UL — SIGNIFICANT CHANGE UP (ref 0–0.2)
BASOPHILS NFR BLD AUTO: 0.4 % — SIGNIFICANT CHANGE UP (ref 0–2)
BILIRUB SERPL-MCNC: 0.4 MG/DL — SIGNIFICANT CHANGE UP (ref 0.2–1.2)
BILIRUB UR-MCNC: NEGATIVE — SIGNIFICANT CHANGE UP
BUN SERPL-MCNC: 28 MG/DL — HIGH (ref 7–23)
CALCIUM SERPL-MCNC: 9.5 MG/DL — SIGNIFICANT CHANGE UP (ref 8.5–10.1)
CHLORIDE SERPL-SCNC: 110 MMOL/L — HIGH (ref 96–108)
CK MB BLD-MCNC: 1.9 % — SIGNIFICANT CHANGE UP (ref 0–3.5)
CK MB CFR SERPL CALC: 1.7 NG/ML — SIGNIFICANT CHANGE UP (ref 0–3.6)
CK SERPL-CCNC: 90 U/L — SIGNIFICANT CHANGE UP (ref 26–308)
CO2 SERPL-SCNC: 25 MMOL/L — SIGNIFICANT CHANGE UP (ref 22–31)
COLOR SPEC: YELLOW — SIGNIFICANT CHANGE UP
CREAT SERPL-MCNC: 2.2 MG/DL — HIGH (ref 0.5–1.3)
DIFF PNL FLD: ABNORMAL
EOSINOPHIL # BLD AUTO: 0.12 K/UL — SIGNIFICANT CHANGE UP (ref 0–0.5)
EOSINOPHIL NFR BLD AUTO: 1.6 % — SIGNIFICANT CHANGE UP (ref 0–6)
GLUCOSE SERPL-MCNC: 100 MG/DL — HIGH (ref 70–99)
GLUCOSE UR QL: NEGATIVE — SIGNIFICANT CHANGE UP
HCT VFR BLD CALC: 30.6 % — LOW (ref 39–50)
HGB BLD-MCNC: 9.6 G/DL — LOW (ref 13–17)
IMM GRANULOCYTES NFR BLD AUTO: 0.5 % — SIGNIFICANT CHANGE UP (ref 0–1.5)
INR BLD: 1.65 RATIO — HIGH (ref 0.88–1.16)
KETONES UR-MCNC: ABNORMAL
LACTATE SERPL-SCNC: 0.9 MMOL/L — SIGNIFICANT CHANGE UP (ref 0.7–2)
LEUKOCYTE ESTERASE UR-ACNC: NEGATIVE — SIGNIFICANT CHANGE UP
LYMPHOCYTES # BLD AUTO: 0.71 K/UL — LOW (ref 1–3.3)
LYMPHOCYTES # BLD AUTO: 9.4 % — LOW (ref 13–44)
MAGNESIUM SERPL-MCNC: 2.3 MG/DL — SIGNIFICANT CHANGE UP (ref 1.6–2.6)
MCHC RBC-ENTMCNC: 26.4 PG — LOW (ref 27–34)
MCHC RBC-ENTMCNC: 31.4 GM/DL — LOW (ref 32–36)
MCV RBC AUTO: 84.3 FL — SIGNIFICANT CHANGE UP (ref 80–100)
MONOCYTES # BLD AUTO: 0.5 K/UL — SIGNIFICANT CHANGE UP (ref 0–0.9)
MONOCYTES NFR BLD AUTO: 6.6 % — SIGNIFICANT CHANGE UP (ref 2–14)
NEUTROPHILS # BLD AUTO: 6.13 K/UL — SIGNIFICANT CHANGE UP (ref 1.8–7.4)
NEUTROPHILS NFR BLD AUTO: 81.5 % — HIGH (ref 43–77)
NITRITE UR-MCNC: NEGATIVE — SIGNIFICANT CHANGE UP
NRBC # BLD: 0 /100 WBCS — SIGNIFICANT CHANGE UP (ref 0–0)
NT-PROBNP SERPL-SCNC: HIGH PG/ML (ref 0–450)
PH UR: 7 — SIGNIFICANT CHANGE UP (ref 5–8)
PLATELET # BLD AUTO: 340 K/UL — SIGNIFICANT CHANGE UP (ref 150–400)
POTASSIUM SERPL-MCNC: 3.7 MMOL/L — SIGNIFICANT CHANGE UP (ref 3.5–5.3)
POTASSIUM SERPL-SCNC: 3.7 MMOL/L — SIGNIFICANT CHANGE UP (ref 3.5–5.3)
PROCALCITONIN SERPL-MCNC: 0.16 NG/ML — HIGH (ref 0–0.04)
PROT SERPL-MCNC: 7.9 G/DL — SIGNIFICANT CHANGE UP (ref 6–8.3)
PROT UR-MCNC: 500 MG/DL
PROTHROM AB SERPL-ACNC: 18.9 SEC — HIGH (ref 10.6–13.6)
RAPID RVP RESULT: SIGNIFICANT CHANGE UP
RBC # BLD: 3.63 M/UL — LOW (ref 4.2–5.8)
RBC # FLD: 17.4 % — HIGH (ref 10.3–14.5)
SARS-COV-2 RNA SPEC QL NAA+PROBE: SIGNIFICANT CHANGE UP
SODIUM SERPL-SCNC: 142 MMOL/L — SIGNIFICANT CHANGE UP (ref 135–145)
SP GR SPEC: 1.01 — SIGNIFICANT CHANGE UP (ref 1.01–1.02)
TROPONIN I SERPL-MCNC: 0.06 NG/ML — HIGH (ref 0.01–0.04)
TROPONIN I SERPL-MCNC: 0.09 NG/ML — HIGH (ref 0.01–0.04)
UROBILINOGEN FLD QL: NEGATIVE — SIGNIFICANT CHANGE UP
WBC # BLD: 7.53 K/UL — SIGNIFICANT CHANGE UP (ref 3.8–10.5)
WBC # FLD AUTO: 7.53 K/UL — SIGNIFICANT CHANGE UP (ref 3.8–10.5)

## 2021-10-18 PROCEDURE — 93970 EXTREMITY STUDY: CPT | Mod: 26

## 2021-10-18 PROCEDURE — 99285 EMERGENCY DEPT VISIT HI MDM: CPT

## 2021-10-18 PROCEDURE — 99223 1ST HOSP IP/OBS HIGH 75: CPT

## 2021-10-18 PROCEDURE — 71045 X-RAY EXAM CHEST 1 VIEW: CPT | Mod: 26

## 2021-10-18 PROCEDURE — 71250 CT THORAX DX C-: CPT | Mod: 26

## 2021-10-18 RX ORDER — HYDRALAZINE HCL 50 MG
100 TABLET ORAL THREE TIMES A DAY
Refills: 0 | Status: DISCONTINUED | OUTPATIENT
Start: 2021-10-18 | End: 2021-10-29

## 2021-10-18 RX ORDER — PIPERACILLIN AND TAZOBACTAM 4; .5 G/20ML; G/20ML
3.38 INJECTION, POWDER, LYOPHILIZED, FOR SOLUTION INTRAVENOUS ONCE
Refills: 0 | Status: COMPLETED | OUTPATIENT
Start: 2021-10-18 | End: 2021-10-18

## 2021-10-18 RX ORDER — VANCOMYCIN HCL 1 G
1000 VIAL (EA) INTRAVENOUS ONCE
Refills: 0 | Status: COMPLETED | OUTPATIENT
Start: 2021-10-18 | End: 2021-10-18

## 2021-10-18 RX ORDER — ASPIRIN/CALCIUM CARB/MAGNESIUM 324 MG
81 TABLET ORAL DAILY
Refills: 0 | Status: DISCONTINUED | OUTPATIENT
Start: 2021-10-18 | End: 2021-10-29

## 2021-10-18 RX ORDER — DOXAZOSIN MESYLATE 4 MG
8 TABLET ORAL AT BEDTIME
Refills: 0 | Status: DISCONTINUED | OUTPATIENT
Start: 2021-10-18 | End: 2021-10-29

## 2021-10-18 RX ORDER — ISOSORBIDE MONONITRATE 60 MG/1
30 TABLET, EXTENDED RELEASE ORAL DAILY
Refills: 0 | Status: DISCONTINUED | OUTPATIENT
Start: 2021-10-18 | End: 2021-10-24

## 2021-10-18 RX ORDER — VENLAFAXINE HCL 75 MG
150 CAPSULE, EXT RELEASE 24 HR ORAL DAILY
Refills: 0 | Status: DISCONTINUED | OUTPATIENT
Start: 2021-10-18 | End: 2021-10-29

## 2021-10-18 RX ORDER — HYDRALAZINE HCL 50 MG
10 TABLET ORAL ONCE
Refills: 0 | Status: COMPLETED | OUTPATIENT
Start: 2021-10-18 | End: 2021-10-18

## 2021-10-18 RX ORDER — NIFEDIPINE 30 MG
60 TABLET, EXTENDED RELEASE 24 HR ORAL DAILY
Refills: 0 | Status: DISCONTINUED | OUTPATIENT
Start: 2021-10-18 | End: 2021-10-24

## 2021-10-18 RX ORDER — SODIUM CHLORIDE 9 MG/ML
2100 INJECTION INTRAMUSCULAR; INTRAVENOUS; SUBCUTANEOUS ONCE
Refills: 0 | Status: DISCONTINUED | OUTPATIENT
Start: 2021-10-18 | End: 2021-10-18

## 2021-10-18 RX ORDER — FOLIC ACID 0.8 MG
1 TABLET ORAL DAILY
Refills: 0 | Status: DISCONTINUED | OUTPATIENT
Start: 2021-10-18 | End: 2021-10-29

## 2021-10-18 RX ORDER — BUDESONIDE AND FORMOTEROL FUMARATE DIHYDRATE 160; 4.5 UG/1; UG/1
2 AEROSOL RESPIRATORY (INHALATION)
Refills: 0 | Status: DISCONTINUED | OUTPATIENT
Start: 2021-10-18 | End: 2021-10-29

## 2021-10-18 RX ORDER — FUROSEMIDE 40 MG
60 TABLET ORAL ONCE
Refills: 0 | Status: COMPLETED | OUTPATIENT
Start: 2021-10-18 | End: 2021-10-18

## 2021-10-18 RX ORDER — AMLODIPINE BESYLATE 2.5 MG/1
10 TABLET ORAL DAILY
Refills: 0 | Status: DISCONTINUED | OUTPATIENT
Start: 2021-10-18 | End: 2021-10-29

## 2021-10-18 RX ORDER — ATORVASTATIN CALCIUM 80 MG/1
10 TABLET, FILM COATED ORAL AT BEDTIME
Refills: 0 | Status: DISCONTINUED | OUTPATIENT
Start: 2021-10-18 | End: 2021-10-29

## 2021-10-18 RX ORDER — FAMOTIDINE 10 MG/ML
20 INJECTION INTRAVENOUS DAILY
Refills: 0 | Status: DISCONTINUED | OUTPATIENT
Start: 2021-10-18 | End: 2021-10-29

## 2021-10-18 RX ORDER — APIXABAN 2.5 MG/1
5 TABLET, FILM COATED ORAL EVERY 12 HOURS
Refills: 0 | Status: DISCONTINUED | OUTPATIENT
Start: 2021-10-18 | End: 2021-10-29

## 2021-10-18 RX ORDER — MIRTAZAPINE 45 MG/1
30 TABLET, ORALLY DISINTEGRATING ORAL AT BEDTIME
Refills: 0 | Status: DISCONTINUED | OUTPATIENT
Start: 2021-10-18 | End: 2021-10-29

## 2021-10-18 RX ORDER — LAMOTRIGINE 25 MG/1
100 TABLET, ORALLY DISINTEGRATING ORAL DAILY
Refills: 0 | Status: DISCONTINUED | OUTPATIENT
Start: 2021-10-18 | End: 2021-10-29

## 2021-10-18 RX ORDER — PREGABALIN 225 MG/1
1000 CAPSULE ORAL DAILY
Refills: 0 | Status: DISCONTINUED | OUTPATIENT
Start: 2021-10-18 | End: 2021-10-29

## 2021-10-18 RX ORDER — ARIPIPRAZOLE 15 MG/1
30 TABLET ORAL DAILY
Refills: 0 | Status: DISCONTINUED | OUTPATIENT
Start: 2021-10-18 | End: 2021-10-29

## 2021-10-18 RX ORDER — FUROSEMIDE 40 MG
40 TABLET ORAL DAILY
Refills: 0 | Status: DISCONTINUED | OUTPATIENT
Start: 2021-10-18 | End: 2021-10-19

## 2021-10-18 RX ADMIN — FAMOTIDINE 20 MILLIGRAM(S): 10 INJECTION INTRAVENOUS at 22:04

## 2021-10-18 RX ADMIN — PIPERACILLIN AND TAZOBACTAM 3.38 GRAM(S): 4; .5 INJECTION, POWDER, LYOPHILIZED, FOR SOLUTION INTRAVENOUS at 17:02

## 2021-10-18 RX ADMIN — ATORVASTATIN CALCIUM 10 MILLIGRAM(S): 80 TABLET, FILM COATED ORAL at 22:53

## 2021-10-18 RX ADMIN — Medication 250 MILLIGRAM(S): at 17:03

## 2021-10-18 RX ADMIN — Medication 60 MILLIGRAM(S): at 22:04

## 2021-10-18 RX ADMIN — Medication 0.1 MILLIGRAM(S): at 22:04

## 2021-10-18 RX ADMIN — Medication 8 MILLIGRAM(S): at 22:53

## 2021-10-18 RX ADMIN — MIRTAZAPINE 30 MILLIGRAM(S): 45 TABLET, ORALLY DISINTEGRATING ORAL at 22:59

## 2021-10-18 RX ADMIN — APIXABAN 5 MILLIGRAM(S): 2.5 TABLET, FILM COATED ORAL at 19:12

## 2021-10-18 RX ADMIN — ISOSORBIDE MONONITRATE 30 MILLIGRAM(S): 60 TABLET, EXTENDED RELEASE ORAL at 22:04

## 2021-10-18 RX ADMIN — LAMOTRIGINE 100 MILLIGRAM(S): 25 TABLET, ORALLY DISINTEGRATING ORAL at 23:00

## 2021-10-18 RX ADMIN — BUDESONIDE AND FORMOTEROL FUMARATE DIHYDRATE 2 PUFF(S): 160; 4.5 AEROSOL RESPIRATORY (INHALATION) at 19:13

## 2021-10-18 RX ADMIN — Medication 60 MILLIGRAM(S): at 14:25

## 2021-10-18 RX ADMIN — Medication 10 MILLIGRAM(S): at 12:34

## 2021-10-18 RX ADMIN — PIPERACILLIN AND TAZOBACTAM 200 GRAM(S): 4; .5 INJECTION, POWDER, LYOPHILIZED, FOR SOLUTION INTRAVENOUS at 15:44

## 2021-10-18 RX ADMIN — AMLODIPINE BESYLATE 10 MILLIGRAM(S): 2.5 TABLET ORAL at 22:53

## 2021-10-18 RX ADMIN — PREGABALIN 1000 MICROGRAM(S): 225 CAPSULE ORAL at 22:53

## 2021-10-18 RX ADMIN — Medication 150 MILLIGRAM(S): at 22:57

## 2021-10-18 RX ADMIN — Medication 100 MILLIGRAM(S): at 22:04

## 2021-10-18 RX ADMIN — ARIPIPRAZOLE 30 MILLIGRAM(S): 15 TABLET ORAL at 22:53

## 2021-10-18 RX ADMIN — Medication 10 MILLIGRAM(S): at 17:04

## 2021-10-18 NOTE — H&P ADULT - PROBLEM SELECTOR PLAN 1
- Pt presented with SOB worse with laying down and with LE edema Since Sunday  - CxR: Bilateral lung parenchymal airspace opacities, increased from prior. Small pleural effusions suggested  - B/l LE Dopplers: No evidence of deep venous thrombosis in either lower extremity.  - BNP: 91473  - TTE on 10/13/21(previous admission): LVEF 55-60%. LV diastolic function cannot determine due to atrial fibrillation. -- Do not need to repeat at time  - supplemental O2 as needed   - s/p 60mg IVP  - Will c/w 40mg IVP lasix daily  - Monitor fluid status closely  - Cardio, Dr. [facility], following - Pt presented with SOB worse with laying down and with LE edema Since Sunday  - CxR: Bilateral lung parenchymal airspace opacities, increased from prior. Small pleural effusions suggested  - B/l LE Dopplers: No evidence of deep venous thrombosis in either lower extremity.  - BNP: 63735  - TTE on 10/13/21(previous admission): LVEF 55-60%. LV diastolic function cannot determine due to atrial fibrillation. -- Do not need to repeat at time  - supplemental O2 as needed   - s/p 60mg IVP  - Will c/w 40mg IVP lasix daily  - F/u am CT Chest as pt refusing at this time  - Monitor fluid status closely  - Cardio, Dr. Bliss[facility] group, following - Pt presented with SOB worse with laying down and with LE edema Since Sunday  - CxR: Bilateral lung parenchymal airspace opacities, increased from prior. Small pleural effusions suggested  - B/l LE Dopplers: No evidence of deep venous thrombosis in either lower extremity.  - BNP: 74380  - TTE on 10/13/21(previous admission): LVEF 55-60%. LV diastolic function cannot determine due to atrial fibrillation. -- Do not need to repeat at time AC on Chronic Diastolic CHF   - supplemental O2 as needed   - s/p 60mg IVP  - Will c/w 40mg IVP lasix daily, I/O,Weight   - F/u am CT Chest as pt refusing at this time  - Monitor fluid status closely  - Cardio, Dr. Bliss[facility] group, following

## 2021-10-18 NOTE — H&P ADULT - PROBLEM SELECTOR PLAN 8
- History of T2DM -- diet controlled   - HbA1C- 6.3 on last admission (10/8)  - started on carb consistent diet  - can start ISS if BG readings are elevated; BG was controlled on previous admission

## 2021-10-18 NOTE — H&P ADULT - NSHPSOCIALHISTORY_GEN_ALL_CORE
Tobacco: quit in 1980, not active smoker  EtOH: denies   Recreational drug use: cocaine several years ago "a few times" has not used in "many years"  Lives with: [facility] house; group home; no nursing assistance; observation is there  Ambulates: independent, denies walker or cane but uses guard railings  ADLs: independent, but states need for assistance in bathing, cooking; independent with feeding, ambulating  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[place]  Vaccinations: Moderna x2 doses last dose in May

## 2021-10-18 NOTE — CONSULT NOTE ADULT - ASSESSMENT
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 Cardiology consulted for SOB and chest pain    SOB   - presented with c/o SOB that started 2 days ago  - ECHO LVEF 55-60%.LV diastolic function cannot determine due to atrial fibrillation.Mild mitral regurgitation is present.Mild aortic stenosis is  present.Mild tricuspid regurgitation is present . No evidence of any pulmonary hypertension  - CXR showed: mild pleural effusion   - BNP: 69491  - Patient overloaded on examination given lasix 60 mg IVP x 1   - Continue diuresis with Lasix 40 mg IV daily   - Continue BB and ACE/ARB   - Daily weight   - Has baseline LE edema which is unchanged. Has B/L lower extremity edema   - CT chest deferred as patient unable to lay flat    Chest pain (Atypical)  - Patient presented with chest discomfort, resolved at present  - Patient is not complaining of any cardiac symptoms at this time.  - EKG showed Afib, no ischemic changes noted  - No clear evidence of acute ischemia.   - CK  <--46,  <--51,  <--51,  <--46,  <--51 CKMB  Troponin I   - Troponin negative x 1. trend until peaked  - admit to tele     Afib   - currently rate controlled HR: 80's  - continue tele monitoring   - Monitor and replete Lytes. Keep K > 4 and Mg > 2    - All other medical needs as per primary team.  - Other cardiovascular workup will depend on clinical course.  - Will continue to follow.    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 Cardiology consulted for SOB and chest pain    SOB   - presented with c/o SOB that started 2 days ago  - ECHO LVEF 55-60%.LV diastolic function cannot determine due to atrial fibrillation.Mild mitral regurgitation is present.Mild aortic stenosis is  present.Mild tricuspid regurgitation is present . No evidence of any pulmonary hypertension  - CXR showed: mild pleural effusion   - BNP: 16109  - Patient overloaded on examination given lasix 60 mg IVP x 1   - Continue diuresis with Lasix 40 mg IV daily   - Continue BB and ACE/ARB   - Daily weight,. Monitor Strict I/O's   - Has baseline LE edema which is unchanged. Has B/L lower extremity edema   - CT chest deferred as patient unable to lay flat    Chest pain (Atypical)  - Patient presented with chest discomfort, resolved at present  - Patient is not complaining of any cardiac symptoms at this time.  - EKG showed Afib, no ischemic changes noted  - No clear evidence of acute ischemia.   - CK  <--46,  <--51,  <--51,  <--46,  <--51 CKMB  Troponin I   - Troponin negative x 1. trend until peaked  - admit to tele     Afib   - currently rate controlled HR: 80's  - continue tele monitoring   - Monitor and replete Lytes. Keep K > 4 and Mg > 2    Hypertensive urgency.   - On admission /90/ given hydralazine in ED 10 mg IV X2 doses  - recently admitted with hypertensive urgency on 10/2021  - Continue clonidine 0.1 TID  - On hydralazine 100 mg q 8 hrs   - continue amlodipine 10 mg qd  - continue nifedipine XL  increased to 60mg by cardio   - continue  Imdur 30mg   - Continue to monitor BP.    CAD with stent placement   - continue with ASA  - continue statin  - no complaints of anginal symptoms  - + trop likely from demand, trend CE until peaked  - admit to tele     - All other medical needs as per primary team.  - Other cardiovascular workup will depend on clinical course.  - Will continue to follow.    Avani Link [facility]  Nurse Pracitioner - Cardiology   Spectra #[phone]/ [phone]

## 2021-10-18 NOTE — ED PROVIDER NOTE - CARE PLAN
1 Principal Discharge DX:	Respiratory distress  Secondary Diagnosis:	Pulmonary infiltrates  Secondary Diagnosis:	Pleural effusion  Secondary Diagnosis:	Hypoxia  Secondary Diagnosis:	Renal insufficiency

## 2021-10-18 NOTE — H&P ADULT - ASSESSMENT
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 Patient states his SOB started on Sunday and has been worse with laying down. Does admit to intermittent CP associated with the SOB and also when getting imaging as that makes him anxious. Does not appear to have a clear understanding of his medical conditions. Also, states he had some bleeding in his mouth on Sunday, but there is no longer bleeding; on exam there is a healing cut on his tongue and inner right cheek with no active bleeding likely from the pt biting his tongue. Admits difficulty laying down straight due to SOB. Denies fevers, chills, abd pain, n/v, sore throat, cough, palpitations  Of note, pt was recently admitted to [facility] on 10/7/21 for hypertensive urgency, Afib and PNA.     ED Course:   Vitals: BP: 210/96-->181/84, HR: 79, Temp: 98 F, RR: 17, SpO2: 100% on RA-->89% on RA-->98% on 2L NC   Labs: H/H: 9.6/30.6, aPTT: 43, PT/INR: 18.9/1.65, BUN/Cr: 28/2.2, alb: 3.1, GFR: 28, procal: .16, trop: .059, proBNP:10,846  Imaging:   EKG: HR: 74, Atrial fibrillation, Incomplete right bundle branch block, Nonspecific T wave abnormality, Prolonged QT)461)  Received in the ED: Vanco and Zosyn x2, IV lasix 60mg, 10mg Hydralazine IVP x2 75 yo M with PMHx of Type 2 DM (not on insulin), BPH, CAD s/p stents >4 years ago (not on plavix), diverticulitis (hospitalized 07/2020 at [facility]), GIB 2/2 diverticulosis (2020), anxiety, Lupus, HTN, HLD, CKD stage 3, HepC, 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 Patient states his SOB started on Sunday and has been worse with laying down. Does admit to intermittent CP associated with the SOB and also when getting imaging as that makes him anxious. Does not appear to have a clear understanding of his medical conditions. Also, states he had some bleeding in his mouth on Sunday, but there is no longer bleeding; on exam there is a healing cut on his tongue and inner right cheek with no active bleeding likely from the pt biting his tongue. Admits difficulty laying down straight due to SOB. Denies fevers, chills, abd pain, n/v, sore throat, cough, palpitations  Of note, pt was recently admitted to [facility] on 10/7/21 for hypertensive urgency, Afib and PNA.     ED Course:   Vitals: BP: 210/96-->181/84, HR: 79, Temp: 98 F, RR: 17, SpO2: 100% on RA-->89% on RA-->98% on 2L NC   Labs: H/H: 9.6/30.6, aPTT: 43, PT/INR: 18.9/1.65, BUN/Cr: 28/2.2, alb: 3.1, GFR: 28, procal: .16, trop: .059, proBNP:10,846  Imaging:   CxR:  Bilateral lung parenchymal airspace opacities, increased from prior. Small pleural effusions suggested  B/l LE Dopplers: No evidence of deep venous thrombosis in either lower extremity.  Bilateral popliteal fossa fluid collections, likely Baker's cysts.  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 Admitted for CHF exacerbation and HTN urgency. 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 Pt was Rxed for PNA last week in Hospital. Admitted for CHF exacerbation and HTN urgency.

## 2021-10-18 NOTE — ED ADULT NURSE NOTE - OBJECTIVE STATEMENT
patient came in ED from [facility] with c/o shortness of breath worsening X 3 days. patient added he was recently discharged from this Hospital for about the same complaint. alert and oriented x 3. labored respiration noted. denies chest pain. patient placed on 2l/nc r/t O2 sat of 89%. abdomen distended. bilateral lower extremities noted + 3 pitting edema.

## 2021-10-18 NOTE — ED PROVIDER NOTE - NEUROLOGICAL, MLM
Alert and oriented, no focal deficits, no motor or sensory deficits. Pt does appear to have poor insight into medical condition with flat affect

## 2021-10-18 NOTE — CONSULT NOTE ADULT - SUBJECTIVE AND OBJECTIVE BOX
Date/Time Patient Seen:  		  Referring MD:   Data Reviewed	       Patient is a 76y old  Male who presents with a chief complaint of     Subjective/HPI  old records reviewed  seen and examined  vs noted  labs reviewed  H and P reviewed  ER provider note reviewed  poor historian  resides in group home - facility -      75 yo M with PMHx of Type 2 DM (not on insulin), BPH, CAD s/p stents >4 years ago (not on plavix), diverticulitis (hospitalized 07/2020 at [facility]), GIB 2/2 diverticulosis (2020), anxiety, Lupus, HTN, HLD, CKD stage 3, HepC, hx of blood clots in brain (s/p surgery 2013) living in a group home [facility] house presenting to [facility] today with multiple concerns including subjective fevers, SOB, weakness, and brief episodes of palpitations, and urinary frequency "for at least 3 days". Pt states he would intermittently feel chills and feverish, recorded no temps at group home since last couple of days, he states progressively worsening of dyspnea on exertion and rest, patient unable to walk short distances or climb stairs due to dyspnea, however denies orthopnea, cough, sputum production, night sweats. Patient mentioned some dizziness associated with lower extremities weakness, usually ambulates independently but now feels unable to hold his weight. Regarding palpitations, pt states for 3 days his heart would skip a beat, return to normal. Self limited episodes under a minute, no associated chest pain. Pt also describes urinary frequency beyond baseline, denies dysuria or incontinence, hematuria, constipation.   PAST MEDICAL & SURGICAL HISTORY:  Hypertension    Diabetes mellitus    Lupus    Hepatitis C    Anxiety and depression    CAD (coronary artery disease)  s/p stents    Diverticulosis    Hyperlipidemia    HTN (hypertension)  c/b multiple episodes of hypertensive urgency    HLD (hyperlipidemia)    Atrial fibrillation  first documented on EKG 10/7/2021    CAD (coronary artery disease)  s/p stents (not on plavix)    Type 2 diabetes mellitus  not on home insulin    Anxiety  multiple psych medications    History of diverticulitis  07/2021    Diverticulosis  c/b GIB in 2020    Afib    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  Had surgery ( April 2013 )    S/P tonsillectomy    S/P arthroscopic knee surgery  Bilateral ( 2005 )    Torsion of testicle  Had surgery at age 14    Pilonidal cyst  Had surgery ( 1969 )    S/P cataract surgery  Bilateral    H/O hernia repair    FAMILY HISTORY:  FHx: throat cancer  No family history of colorectal cancer.     Social History:  Social History (marital status, living situation, occupation, tobacco use, alcohol and drug use, and sexual history): Tobacco: quit in 1980, not active smoker  EtOH: denies   Recreational drug use: cocaine several years ago "a few times" has not used in "many years"  Lives with: [facility]; group home; no nursing assistance; observation is there  Ambulates: independent, denies walker or cane but uses guard railings  ADLs: independent, but states need for assistance in bathing, cooking; independent with feeding, ambulating  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[place]  Vaccinations: Moderna x2 doses last dose in May     Tobacco Screening:  · Core Measure Site	Yes  · Has the patient used tobacco in the past 30 days?	No    Risk Assessment:    Present on Admission:  Deep Venous Thrombosis	no  Pulmonary Embolus	no  Urinary Catheter	no  Central Venous Catheter/PICC Line	no  Surgical Site Incision	no  Pressure Ulcer(s)	no     Heart Failure:  Does this patient have a history of or has been diagnosed with heart failure? yes.     LV Function Assessment (LVS function was evaluated before arrival and/or during hospitalization) no.      Medication list         MEDICATIONS  (STANDING):  hydrALAZINE Injectable 10 milliGRAM(s) IV Push once  vancomycin  IVPB 1000 milliGRAM(s) IV Intermittent Once    MEDICATIONS  (PRN):         Vitals log        ICU Vital Signs Last 24 Hrs  T(C): 36.7 (18 Oct 2021 11:15), Max: 36.7 (18 Oct 2021 10:54)  T(F): 98 (18 Oct 2021 11:15), Max: 98 (18 Oct 2021 10:54)  HR: 78 (18 Oct 2021 14:26) (75 - 79)  BP: 203/92 (18 Oct 2021 14:26) (203/92 - 210/96)  BP(mean): --  ABP: --  ABP(mean): --  RR: 20 (18 Oct 2021 14:26) (17 - 20)  SpO2: 100% (18 Oct 2021 14:26) (89% - 100%)           Input and Output:  I&O's Detail      Lab Data                        9.6    7.53  )-----------( 340      ( 18 Oct 2021 11:42 )             30.6     10-18    142  |  110<H>  |  28<H>  ----------------------------<  100<H>  3.7   |  25  |  2.20<H>    Ca    9.5      18 Oct 2021 11:42  Mg     2.3     10-18    TPro  7.9  /  Alb  3.1<L>  /  TBili  0.4  /  DBili  x   /  AST  19  /  ALT  24  /  AlkPhos  50  10-18      CARDIAC MARKERS ( 18 Oct 2021 11:42 )  .059 ng/mL / x     / x     / x     / x            Review of Systems	  sob  alicia  weakness      Objective     Physical Examination    heart s1s2  lung dec BS  abd soft  head nc  head at  LE edema  verbal  on o2 support  obese      Pertinent Lab findings & Imaging      El:  NO   Adequate UO     I&O's Detail           Discussed with:     Cultures:	        Radiology        EXAM:  US DPLX LWR EXT VEINS COMPL BI                            PROCEDURE DATE:  10/18/2021          INTERPRETATION:  CLINICAL INFORMATION: Lower extremity edema    COMPARISON: 07/20/2021.    TECHNIQUE: Duplex sonography of the BILATERAL LOWER extremity veins with color and spectral Doppler, with and without compression.    FINDINGS:    RIGHT:  Normal compressibility of the RIGHT common femoral, femoral and popliteal veins.  Doppler examination shows normal spontaneous and phasic flow.  No RIGHT calf vein thrombosis is detected.  Right popliteal fluid collection measures 4.2 x 3.3 x 1.3 cm.    LEFT:  Normal compressibility of the LEFT common femoral, femoral and popliteal veins.  Doppler examination shows normal spontaneous and phasic flow.  No LEFT calf vein thrombosis is detected.  Left popliteal fossa fluid collection measuring 4.9 x 2.8 x 1.3 cm.    IMPRESSION:  No evidence of deep venous thrombosis in either lower extremity.  Bilateral popliteal fossa fluid collections, likely Baker's cysts.        --- End of Report ---            ROMMEL GALEANO MD; Attending Radiologist  This document has been electronically signed. Oct 18 2021  2:21PM        EXAM:  XR CHEST PORTABLE IMMED 1V                            PROCEDURE DATE:  10/18/2021          INTERPRETATION:  INDICATION: Sepsis    PRIORS: 10/6/2021    VIEWS: Portable AP radiography of the chest performed.    FINDINGS: Enlargement of the cardiac silhouette, likely related to cardiomegaly. No superior mediastinal widening. Bilateral lung parenchymal airspace opacities, increased from prior. Small pleural effusions suggested. No evidence for pneumothorax. No mediastinal shift. No acute osseous fractures.    IMPRESSION: Bilateral lung parenchymal airspace opacities, increased from prior. Small pleural effusions suggested.    --- End of Report ---            JOAQUIN PATEL MD; Attending Radiologist  This document has been electronically signed. Oct 18 2021  1:17PM         EXAM:  ECHO TTE WO CON COMP W DOPP         PROCEDURE DATE:  10/13/2021        INTERPRETATION:  Ordering Physician: SKYLA TERRY [id number]  Indication: Cardiac arrhythmias.  Technician: INDIGO  Study Quality: Technical difficult study.  A complete echocardiographic study was performed utilizing standard protocol including spectral and color Doppler in all echocardiographic windows.  Height: 172cm  Weight: 102kg  BSA: 2.1m2  Blood Pressure: 150/90  MEASUREMENTS  IVS: 1.3cm  PWT: 1.3cm  LA: 4.2cm  AO: 3.5cm  LVIDd: 5.4 cm.  LVIDs: 3.5cm  LVEF: 55-60%.  PASP: 34mm Hg  FINDINGS  Left Ventricle: Normal in  size and normal LV systolic function with estimated LVEF 55-60%.  Right Ventricle: Normal in size and normal RV systolic function.  Left Atrium: Mild dilated.  Right Atrium: Normal in size.  Mitral Valve: Mild mitral annular calcification is present. Mitral valve is mildly calcified and no evidence of any mitral stenosis. Mild mitral regurgitation is present.  Aortic Valve: Aortic root is mild sclerotic and of normal dimension. Aortic valve is mildly calcified and mild  aortic stenosis is present with peak gradient across aortic valve is 30 mmHg. Aortic valve area not calculated.  Tricuspid Valve: Mild tricuspid regurgitation with calculated PA systolic pressure 34 mmHg is present. No evidence of any pulmonary hypertension  Pulmonic Valve: Trace Pulmonary regurgitation is present.  Diastolic Function: LV diastolic function cannot determine due to  underlying atrial fibrillation.  Pericardium/Pleura: No evidence of any pericardial effusion.  No intracardiac mass  thrombus or vegetations and  tumor.  Mild concentric left ventricular hypertrophy is present.  IMPRESSION:  Normal LV size with normal LV systolic function with estimated LVEF 55-60%.    LV diastolic function cannot determine due to atrial fibrillation.    Mild mitral regurgitation is present.    Mild aortic stenosis is  present.    Mild tricuspid regurgitation is present . No evidence of any pulmonary hypertension    Correlate clinically above findings.    --- End of Report ---              SKYLA TERRY MD; Attending Cardiologist  This document has been electronically signed. Oct 13 2021 11:55PM        EXAM:  CT ABDOMEN AND PELVIS                          EXAM:  CT CHEST                            PROCEDURE DATE:  10/07/2021          INTERPRETATION:  CLINICAL INFORMATION: Shortness of breath. Abdominal pain. Fever.    TECHNIQUE: CT imaging of the chest, abdomen, and pelvis was obtained without IV contrast. MIP images were also obtained.    COMPARISON: 7/19/2021    FINDINGS:    Chest:    Multiple heterogeneous thyroid nodules noted bilaterally which can be better evaluated with ultrasound.    There is no supraclavicular, axillary, mediastinal or hilar lymphadenopathy.    The heart is mildly enlarged. There is no pericardial effusion.    Branching nodular opacities and patchy airspace opacities noted in the lingula and bilateral lower lobes which are likely infectious in etiology. No pleural effusions are present. The tracheobronchial tree is patent.    Abdomen/Pelvis:    The liver, spleen, pancreas, gallbladder and biliary tree are unremarkable.    There is no hydronephrosis. Stable 2.6 cm right renal lesion. The adrenal glands are unremarkable.    There is no bowel obstruction or ascites. Scattered colonic diverticula. The appendix is normal.    The bladder, prostate and seminal vesicles are unremarkable.    There is no abdominal or pelvic lymphadenopathy.    The aorta and IVC are unremarkable.    Degenerative changes of the spine.    IMPRESSION:    Multilobar pneumonia.    Mild cardiomegaly.    No small bowel obstruction or active bowel inflammation.    --- End of Report ---            BRYAN CHAVEZ MD; Attending Radiologist  This document has been electronically signed. Oct  7 2021  1:16AM

## 2021-10-18 NOTE — CONSULT NOTE ADULT - SUBJECTIVE AND OBJECTIVE BOX
Patient is a 76y old  Male who presents with a chief complaint of SOB.     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 Patient states his SOB started on Sunday and has been worse with laying down. Does admit to intermittent CP associated with the SOB and also when getting imaging as that makes him anxious. Does not appear to have a clear understanding of his medical conditions. Also, states he had some bleeding in his mouth on Sunday, but there is no longer bleeding; on exam there is a healing cut on his tongue and inner right cheek with no active bleeding likely from the pt biting his tongue. Admits difficulty laying down straight due to SOB. Denies fevers, chills, abd pain, n/v, sore throat, cough, palpitations  Of note, pt was recently admitted to [facility] on 10/7/21 for hypertensive urgency, Afib and  findings of PNA. was Rxed with IV Zosyn x 7 days  Abx .pt also got 1 u pRBC transfusion past week.    ED Course:   Vitals: BP: 210/96-->181/84, HR: 79, Temp: 98 F, RR: 17, SpO2: 100% on RA-->89% on RA-->98% on 2L NC   Labs: H/H: 9.6/30.6, aPTT: 43, PT/INR: 18.9/1.65, BUN/Cr: 28/2.2, alb: 3.1, GFR: 28, procal: .16, trop: .059, proBNP:10,846  Imaging:   CxR:  Bilateral lung parenchymal airspace opacities, increased from prior. Small pleural effusions suggested  B/l LE Dopplers: No evidence of deep venous thrombosis in either lower extremity.  Bilateral popliteal fossa fluid collections, likely Baker's cysts.  EKG: HR: 74, Atrial fibrillation, Incomplete right bundle branch block, Nonspecific T wave abnormality, Prolonged QT(461)  Received in the ED: Vanco and Zosyn x2, IV lasix 60mg, 10mg Hydralazine IVP x2    Renal consult called for chronic kidney disease stage 4. History obtained from chart and patient.       PAST MEDICAL HISTORY:  Hypertension    Diabetes mellitus    Lupus    Hepatitis C    Anxiety and depression    CAD (coronary artery disease)    Diverticulosis    Hyperlipidemia    HTN (hypertension)    HLD (hyperlipidemia)    Atrial fibrillation    CAD (coronary artery disease)    Type 2 diabetes mellitus    Anxiety    History of diverticulitis    Diverticulosis    Afib        PAST SURGICAL HISTORY:  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    S/P tonsillectomy    S/P arthroscopic knee surgery    Torsion of testicle    Pilonidal cyst    S/P cataract surgery    H/O hernia repair        FAMILY HISTORY:  FHx: throat cancer    No family history of colorectal cancer        SOCIAL HISTORY: No smoking or alcohol use     Allergies    No Known Allergies    Intolerances      Home Medications:  Lasix 40 mg oral tablet: 1 tab(s) orally once a day (16 Oct 2021 14:19)  oxybutynin 5 mg oral tablet: 1 tab(s) orally 4 times a day (07 Oct 2021 03:41)  pantoprazole 40 mg oral delayed release tablet: 1 tab(s) orally once a day (before a meal) (07 Oct 2021 03:41)  Pepcid 20 mg oral tablet: 1 tab(s) orally every other day (16 Oct 2021 14:19)    MEDICATIONS  (STANDING):  furosemide   Injectable 60 milliGRAM(s) IV Push Once    MEDICATIONS  (PRN):      REVIEW OF SYSTEMS:  General: no fever  Respiratory: + SOB  Cardiovascular: No CP or Palpitations	  Gastrointestinal: No nausea, Vomiting. No diarrhea  Genitourinary: No urinary complaints	  Musculoskeletal: leg swelling, No new rash or lesions	  all other systems negative    T(F): 98 (10-18-21 @ 11:15), Max: 98 (10-18-21 @ 10:54)  HR: 75 (10-18-21 @ 11:15) (75 - 79)  BP: 209/96 (10-18-21 @ 12:37) (209/96 - 210/96)  RR: 20 (10-18-21 @ 11:15) (17 - 20)  SpO2: 89% (10-18-21 @ 11:15) (89% - 100%)  Wt(kg): --    PHYSICAL EXAM:  General: resp distress  Respiratory: b/l air entry  Cardiovascular: S1 S2  Gastrointestinal: soft  Extremities: + edema        10-18    142  |  110<H>  |  28<H>  ----------------------------<  100<H>  3.7   |  25  |  2.20<H>    Ca    9.5      18 Oct 2021 11:42  Mg     2.3     10-18    TPro  7.9  /  Alb  3.1<L>  /  TBili  0.4  /  DBili  x   /  AST  19  /  ALT  24  /  AlkPhos  50  10-18                          9.6    7.53  )-----------( 340      ( 18 Oct 2021 11:42 )             30.6       Hemoglobin: 9.6 g/dL (10-18 @ 11:42)  Hematocrit: 30.6 % (10-18 @ 11:42)  Potassium, Serum: 3.7 mmol/L (10-18 @ 11:42)  Blood Urea Nitrogen, Serum: 28 mg/dL (10-18 @ 11:42)      Creatinine, Serum: 2.20 (10-18 @ 11:42)  Creatinine, Serum: 2.50 (10-16 @ 09:16)        LIVER FUNCTIONS - ( 18 Oct 2021 11:42 )  Alb: 3.1 g/dL / Pro: 7.9 g/dL / ALK PHOS: 50 U/L / ALT: 24 U/L / AST: 19 U/L / GGT: x           CARDIAC MARKERS ( 18 Oct 2021 11:42 )  .059 ng/mL / x     / x     / x     / x            < from: Xray Chest 1 View-PORTABLE IMMEDIATE (10.18.21 @ 12:58) >    EXAM:  XR CHEST PORTABLE IMMED 1V                            PROCEDURE DATE:  10/18/2021          INTERPRETATION:  INDICATION: Sepsis    PRIORS: 10/6/2021    VIEWS: Portable AP radiography of the chest performed.    FINDINGS: Enlargement of the cardiac silhouette, likely related to cardiomegaly. No superior mediastinal widening. Bilateral lung parenchymal airspace opacities, increased from prior. Small pleural effusions suggested. No evidence for pneumothorax. No mediastinal shift. No acute osseous fractures.    IMPRESSION: Bilateral lung parenchymal airspace opacities, increased from prior. Small pleural effusions suggested.    --- End of Report ---            JOAQUIN PATEL MD; Attending Radiologist  This document has been electronically signed. Oct 18 2021  1:17PM    < end of copied text >

## 2021-10-18 NOTE — H&P ADULT - NSHPOUTPATIENTPROVIDERS_GEN_ALL_CORE
PCP: [facility] [phone]  Dr. Gamboa Cardio  PCP Dr. Man [facility] [phone]  PCP Dr. Man

## 2021-10-18 NOTE — ED PROVIDER NOTE - CLINICAL SUMMARY MEDICAL DECISION MAKING FREE TEXT BOX
Pt is a 77 yo male who presents to the ED with a cc of SOB. PMHx of anxiety multiple psych medications, Atrial fibrillation first documented on EKG 10/7/2021  CAD (coronary artery disease) s/p stents (not on plavix), Diverticulosis c/b GIB in 2020, History of diverticulitis 07/2021, HLD (hyperlipidemia), HTN (hypertension) c/o multiple episodes of hypertensive urgency, Type 2 diabetes mellitus not on home insulin. Pt was last admitted to the hospital from 10/7-10/16 with subjective fevers, SOB, weakness, and urinary frequency. Was found to have multifocal pneumonia and treated with course of abx. Pt also underwent ECHO with EF of 55-60 % and mild MR, AS, TR. During this hospital course pt was treated for HTN urgency and was also noted to be anemic. He was transfused 1 unit PRBCs and started on iron.  Pt stabilized and was discharged home. He reports that at time of discharge he was still experiencing SOB which has progressively worsened. SOB most significant on exertion but also increased upon lying flat. Denies fever, chills, N/V, CP, abd pain. Pt does report cough. Pt presenting with worsening SOB recently treated for HTN urgency, anemia and multifocal pneumonia. Initial concern for worsening infection but in light of edema higher suspicion for volume overload fluids d/c. Will diuresis pt, control BP and begin abx for possible hospital acquired abx. As pt was recently anemic will check repeat H/H. Will begin supplemental oxygen. As pt is hypoxic will require admission as pt is not on home oxygen

## 2021-10-18 NOTE — H&P ADULT - NSICDXPASTMEDICALHX_GEN_ALL_CORE_FT
PAST MEDICAL HISTORY:  Afib     Anxiety multiple psych medications    Atrial fibrillation first documented on EKG 10/7/2021    CAD (coronary artery disease) s/p stents (not on plavix)    Diverticulosis c/b GIB in 2020    History of diverticulitis 07/2021    HLD (hyperlipidemia)     HTN (hypertension) c/b multiple episodes of hypertensive urgency    Type 2 diabetes mellitus not on home insulin     PAST MEDICAL HISTORY:  Afib on AC    Anemia of chronic disease Monoclonal Gammopathy-MGUS  pRBC transfusion 10/15/21    Anxiety Depression  multiple psych medications    Atrial fibrillation first documented on EKG 10/7/2021    CAD (coronary artery disease) s/p stents (not on plavix)    Chronic diastolic congestive heart failure     Diverticulosis c/b GIB in 2020    History of diverticulitis 07/2021    HLD (hyperlipidemia)     HTN (hypertension) c/b multiple episodes of hypertensive urgency    Multiple thyroid nodules     Stage 3 chronic kidney disease     Type 2 diabetes mellitus not on home insulin/ Meds

## 2021-10-18 NOTE — H&P ADULT - PROBLEM SELECTOR PLAN 4
- troponin on admission 0.059  - likely 2/2 demand ischemia; suspect now resolved intermitted CP 2/2 anxiety as it occurred when pt was having imaging done and pt admitted to feeling anxious at that time  - will trend troponin to peak  - EKG: HR: 74, Atrial fibrillation, Incomplete right bundle branch block, Nonspecific T wave abnormality, Prolonged QT(461) -- caution with Qtc prolonging agents   - cardio, Dr. [facility], following - troponin on admission 0.059-H/O CAD with Stents   - likely 2/2 demand ischemia; suspect now resolved intermitted CP 2/2 anxiety as it occurred when pt was having imaging done and pt admitted to feeling anxious at that time  - will trend troponin to peak  - EKG: HR: 74, Atrial fibrillation, Incomplete right bundle branch block, Nonspecific T wave abnormality, Prolonged QT(461) -- caution with Qtc prolonging agents   - cardio, Dr. [facility], following  -On EC ASA 81 mg daily  NO BB 2/2 Bradycardia

## 2021-10-18 NOTE — ED PROVIDER NOTE - PHYSICAL EXAMINATION
pt with hypoxia on room air, increased work of breathing and difficulty speaking on room air but comfortable on NC

## 2021-10-18 NOTE — ED ADULT NURSE NOTE - NSICDXPASTMEDICALHX_GEN_ALL_CORE_FT
PAST MEDICAL HISTORY:  Anxiety multiple psych medications    Atrial fibrillation first documented on EKG 10/7/2021    CAD (coronary artery disease) s/p stents (not on plavix)    Diverticulosis c/b GIB in 2020    History of diverticulitis 07/2021    HLD (hyperlipidemia)     HTN (hypertension) c/b multiple episodes of hypertensive urgency    Type 2 diabetes mellitus not on home insulin     PAST MEDICAL HISTORY:  Afib     Anxiety multiple psych medications    Atrial fibrillation first documented on EKG 10/7/2021    CAD (coronary artery disease) s/p stents (not on plavix)    Diverticulosis c/b GIB in 2020    History of diverticulitis 07/2021    HLD (hyperlipidemia)     HTN (hypertension) c/b multiple episodes of hypertensive urgency    Type 2 diabetes mellitus not on home insulin

## 2021-10-18 NOTE — H&P ADULT - GASTROINTESTINAL DETAILS
soft/nontender/no distention/bowel sounds normal/no rebound tenderness soft/nontender/no distention/no masses palpable/bowel sounds normal/no bruit/no rebound tenderness/no guarding/no rigidity/no organomegaly

## 2021-10-18 NOTE — CONSULT NOTE ADULT - ASSESSMENT
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

## 2021-10-18 NOTE — H&P ADULT - PROBLEM SELECTOR PLAN 2
- Pt presented with /96 --- unclear if pt is compliant with his medications as he does not appear to have a good understanding of his medical conditions --- Psych, Dr. Winkler, consulted for assessment of capacity  - No vision changes, headache, ARIELLA, current CP  - s/p Hydralazine 10mg IVP x2 SBP improved to 160's  - C/w with home antihypertensives: amlodipine, procardia, clonidine, hydralazine, Imdur  - monitor routine hemodynamics  - Cardio, Dr. [facility], following

## 2021-10-18 NOTE — H&P ADULT - PROBLEM SELECTOR PLAN 6
- CKD 3-4- Cr stable   - baseline Cr appears to be 1.6 - 2.3  - Cr on admission 2.2; improved from 2.5 on 10/16  - Avoid nephrotoxic medications  - Trend BMP  - NephDr. Cayden white consulted by ED

## 2021-10-18 NOTE — H&P ADULT - HISTORY OF PRESENT ILLNESS
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     Of note, pt was recently admitted to [facility] on 10/7/21 for hypertensive urgency, Afib and PNA.       Denies fever, chills, chest pain, palpitations,  cough, abdominal pain, nausea, vomiting, diarrhea, constipation, urinary frequency, urgency, or dysuria, headaches, changes in vision, dizziness, numbness, tingling.  Denies recent travel, recent antibiotic use, or sick contacts.    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 Patient states his SOB started on Sunday and has been worse with laying down. Does admit to intermittent CP associated with the SOB and also when getting imaging as that makes him anxious. Does not appear to have a clear understanding of his medical conditions. Also, states he had some bleeding in his mouth on Sunday, but there is no longer bleeding; on exam there is a healing cut on his tongue and inner right cheek with no active bleeding likely from the pt biting his tongue. Admits difficulty laying down straight due to SOB. Denies fevers, chills, abd pain, n/v, sore throat, cough, palpitations  Of note, pt was recently admitted to [facility] on 10/7/21 for hypertensive urgency, Afib and PNA.     ED Course:   Vitals: BP: 210/96-->181/84, HR: 79, Temp: 98 F, RR: 17, SpO2: 100% on RA-->89% on RA-->98% on 2L NC   Labs: H/H: 9.6/30.6, aPTT: 43, PT/INR: 18.9/1.65, BUN/Cr: 28/2.2, alb: 3.1, GFR: 28, procal: .16, trop: .059, proBNP:10,846  Imaging:   EKG: HR: 74, Atrial fibrillation, Incomplete right bundle branch block, Nonspecific T wave abnormality, Prolonged QT)461)  Received in the ED: Vanco and Zosyn x2, IV lasix 60mg, 10mg Hydralazine IVP x2   75 yo M with PMHx of Type 2 DM (not on insulin), BPH, CAD s/p stents >4 years ago (not on plavix), diverticulitis (hospitalized 07/2020 at [facility]), GIB 2/2 diverticulosis (2020), anxiety, Lupus, HTN, HLD, CKD stage 3, HepC, 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 Patient states his SOB started on Sunday and has been worse with laying down. Does admit to intermittent CP associated with the SOB and also when getting imaging as that makes him anxious. Does not appear to have a clear understanding of his medical conditions. Also, states he had some bleeding in his mouth on Sunday, but there is no longer bleeding; on exam there is a healing cut on his tongue and inner right cheek with no active bleeding likely from the pt biting his tongue. Admits difficulty laying down straight due to SOB. Denies fevers, chills, abd pain, n/v, sore throat, cough, palpitations  Of note, pt was recently admitted to [facility] on 10/7/21 for hypertensive urgency, Afib and PNA.     ED Course:   Vitals: BP: 210/96-->181/84, HR: 79, Temp: 98 F, RR: 17, SpO2: 100% on RA-->89% on RA-->98% on 2L NC   Labs: H/H: 9.6/30.6, aPTT: 43, PT/INR: 18.9/1.65, BUN/Cr: 28/2.2, alb: 3.1, GFR: 28, procal: .16, trop: .059, proBNP:10,846  Imaging:   CxR:  Bilateral lung parenchymal airspace opacities, increased from prior. Small pleural effusions suggested  B/l LE Dopplers: No evidence of deep venous thrombosis in either lower extremity.  Bilateral popliteal fossa fluid collections, likely Baker's cysts.  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 Patient states his SOB started on Sunday and has been worse with laying down. Does admit to intermittent CP associated with the SOB and also when getting imaging as that makes him anxious. Does not appear to have a clear understanding of his medical conditions. Also, states he had some bleeding in his mouth on Sunday, but there is no longer bleeding; on exam there is a healing cut on his tongue and inner right cheek with no active bleeding likely from the pt biting his tongue. Admits difficulty laying down straight due to SOB. Denies fevers, chills, abd pain, n/v, sore throat, cough, palpitations  Of note, pt was recently admitted to [facility] on 10/7/21 for hypertensive urgency, Afib and  findings of PNA. was Rxed with IV Zosyn x 7 days  Abx .pt also got 1 u pRBC transfusion past week.    ED Course:   Vitals: BP: 210/96-->181/84, HR: 79, Temp: 98 F, RR: 17, SpO2: 100% on RA-->89% on RA-->98% on 2L NC   Labs: H/H: 9.6/30.6, aPTT: 43, PT/INR: 18.9/1.65, BUN/Cr: 28/2.2, alb: 3.1, GFR: 28, procal: .16, trop: .059, proBNP:10,846  Imaging:   CxR:  Bilateral lung parenchymal airspace opacities, increased from prior. Small pleural effusions suggested  B/l LE Dopplers: No evidence of deep venous thrombosis in either lower extremity.  Bilateral popliteal fossa fluid collections, likely Baker's cysts.  EKG: HR: 74, Atrial fibrillation, Incomplete right bundle branch block, Nonspecific T wave abnormality, Prolonged QT(461)  Received in the ED: Vanco and Zosyn x2, IV lasix 60mg, 10mg Hydralazine IVP x2

## 2021-10-18 NOTE — H&P ADULT - RS GEN PE MLT RESP DETAILS PC
no rales/no rhonchi/no wheezes/diminished breath sounds, L/diminished breath sounds, R breath sounds equal/good air movement/clear to auscultation bilaterally/no rales/no rhonchi/no wheezes/diminished breath sounds, L/diminished breath sounds, R

## 2021-10-18 NOTE — CONSULT NOTE ADULT - ASSESSMENT
CKD 4  Diabetes  Dyspnea: Pneumonia, Fluid overload  Hypertension  h/o SLE  Hypokalemia  Anemia    Renal indices at baseline. Likely to worsen with diuresis. Retacrit for anemia. IV lasix. Monitor blood sugar levels. Insulin coverage as needed. Monitor h/h trend.   Monitor BP trend. Titrate BP meds as needed. Salt restriction. Will follow electrolytes and renal function trend. Avoid nephrotoxic meds as possible.   Further recommendations pending clinical course. Thank you for the courtesy of this referral.

## 2021-10-18 NOTE — H&P ADULT - NEGATIVE MUSCULOSKELETAL SYMPTOMS
no joint swelling/no myalgia/no muscle weakness no arthralgia/no arthritis/no joint swelling/no myalgia/no muscle weakness

## 2021-10-18 NOTE — H&P ADULT - PROBLEM SELECTOR PLAN 7
- Hgb on admission 9.6 (per chart review baseline appears to be 9-10)  - hemodynamically stable  - No active signs or symptoms of bleeding at this time; healing mouth wounds  - On previous admission heme workup showed serum immunofixation with weak IgG-kappa, normal IgG/A/M, both kappa and lambda elevated and k/l ratio sl elevated. Findings more c/w MGUS or reactive, can be monitored serially as outpatient.  - c/w folate and B12  - Out pt follow up with heme  - trend CBC - Hgb on admission 9.6 (per chart review baseline appears to be 9-10) 2/2 CKD -3  - hemodynamically stable, Pt was seen by hematology -Dr Rodriguez Anemia work up done   -pt Got 1 u pRBC 10/15/21   - No active signs or symptoms of bleeding at this time; healing mouth wounds  - On previous admission heme workup showed serum immunofixation with weak IgG-kappa, normal IgG/A/M, both kappa and lambda elevated and k/l ratio sl elevated. Findings more c/w MGUS or reactive, can be monitored serially as outpatient.-MGUS   - c/w folate and B12  - Out pt follow up with heme  - trend CBC

## 2021-10-18 NOTE — CONSULT NOTE ADULT - ATTENDING COMMENTS
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 Cardiology consulted for SOB and chest pain    SOB   - presented with c/o SOB that started 2 days ago  - ECHO LVEF 55-60%.LV diastolic function cannot determine due to atrial fibrillation.Mild mitral regurgitation is present.Mild aortic stenosis is  present.Mild tricuspid regurgitation is present . No evidence of any pulmonary hypertension  - CXR showed: mild pleural effusion   - BNP: 79600  - Patient overloaded on examination given lasix 60 mg IVP x 1   - Continue diuresis with Lasix 40 mg IV daily   - Continue BB and ACE/ARB   - Daily weight,. Monitor Strict I/O's   - Has baseline LE edema which is unchanged. Has B/L lower extremity edema   - CT chest deferred as patient unable to lay flat

## 2021-10-18 NOTE — H&P ADULT - PROBLEM SELECTOR PLAN 5
- chronic  - tele  - EKG: HR: 74, Atrial fibrillation, Incomplete right bundle branch block, Nonspecific T wave abnormality, Prolonged QT(461)  - off digoxin and labetalol since last admission 2/2 Bradycardia & Pauses -- remains rate controlled  - AC with Eliquis 5mg BID   - Cardiology, Dr. Bliss's, group following

## 2021-10-18 NOTE — CONSULT NOTE ADULT - SUBJECTIVE AND OBJECTIVE BOX
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 He was discharged 2 days ago. In ED Patient seen and examined, awake, alert, no complaints of chest pain, palpitations or dizziness c/o dyspnea. Tolerating O2 4L via NC. VS: 180/84 HR: 81, EKG noted Afib 80's     Allergies  No Known Allergies  Intolerances      PAST MEDICAL & SURGICAL HISTORY:  HTN (hypertension)  c/b multiple episodes of hypertensive urgency  HLD (hyperlipidemia)  Atrial fibrillation  first documented on EKG 10/7/2021  CAD (coronary artery disease)  s/p stents (not on plavix)  Type 2 diabetes mellitus  not on home insulin  Anxiety  multiple psych medications  History of diverticulitis  07/2021  Diverticulosis  c/b GIB in 2020  Afib  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  Had surgery ( April 2013 )  S/P tonsillectomy  S/P arthroscopic knee surgery  Bilateral ( 2005 )  Torsion of testicle  Had surgery at age 14  Pilonidal cyst  Had surgery ( 1969 )  S/P cataract surgery  Bilateral  H/O hernia repair    MEDICATIONS  (STANDING):  hydrALAZINE Injectable 10 milliGRAM(s) IV Push once  vancomycin  IVPB 1000 milliGRAM(s) IV Intermittent Once    MEDICATIONS  (PRN):    FAMILY HISTORY:  FHx: throat cancer    No family history of colorectal cancer      SOCIAL HISTORY  Lives on a group home     SUBSTANCE USE  Tobacco Usage:  ( x) None ( ) never smoked   ( ) former smoker  ( ) current smoker; Packs per day:   Alcohol Usage: (x ) none  ( ) occasional ( ) 2-3 times a week ( ) daily; Last drink:   Recreational drugs (x ) None    REVIEW OF SYSTEMS   CONSTITUTIONAL: No fevers, No chills, No fatigue, No weight gain  EYES: No vision changes, No vertigo, No throat pain   ENT: No congestion, No ear pain, No sore throat.  NECK: No pain, No stiffness  RESPIRATORY: +shortness of breath, No cough, No wheezing, No hemoptysis  CARDIOVASCULAR: No chest pain. No palpitations, No VALDEZ, No orthopnea, No PND, No pleuritic pain  GASTROINTESTINAL: No abdominal pain, No nausea, No vomiting, No hematemesis, No diarrhea No constipation. No melena  GENITOURINARY: No dysuria, No frequency, No incontinence, No hematuria  NEUROLOGICAL: No dizziness, No lightheadedness, No syncope, No LOC, No headache, No numbness, No weakness  MUSCULOSKELETAL: No joint pain, No joint swelling.  PSYCHIATRIC: No anxiety, No depression  DERMATOLOGY: No diaphoresis. No itching, No rashes, No pressure ulcers  HEME/LYMPH: No easy bruising, or bleeding gums  All other review of systems is negative unless indicated above.    VITAL SIGNS:   Vital Signs Last 24 Hrs  T(C): 36.7 (18 Oct 2021 11:15), Max: 36.7 (18 Oct 2021 10:54)  T(F): 98 (18 Oct 2021 11:15), Max: 98 (18 Oct 2021 10:54)  HR: 78 (18 Oct 2021 14:26) (75 - 79)  BP: 203/92 (18 Oct 2021 14:26) (203/92 - 210/96)  RR: 20 (18 Oct 2021 14:26) (17 - 20)  SpO2: 100% (18 Oct 2021 14:26) (89% - 100%)    Physical Exam:    Appearance: NAD, no distress, alert,   HEENT: Moist Mucous Membranes, Anicteric  Cardiovascular: Regular rate and rhythm, Normal S1 S2, No JVD, No murmurs, No rubs, gallops or clicks  Respiratory: Lungs clear to auscultation diminished at the bases.  No rales, No rhonchi, No wheezing. No tenderness to palpation  Gastrointestinal:  Soft, Non-tender, + BS  Neurologic: Non-focal  Skin: Warm and dry, No rashes, No ecchymosis, No cyanosis, No ulcers   Musculoskeletal: No clubbing, No cyanosis  Vascular: Peripheral pulses palpable 2+ bilaterally  Psychiatry: Mood & affect appropriate  Lymph: +3 B/L LE pitting edema.     I&O's Summary      LABS: All Labs Reviewed:                        9.6    7.53  )-----------( 340      ( 18 Oct 2021 11:42 )             30.6                         8.5    6.09  )-----------( 278      ( 16 Oct 2021 08:52 )             28.1     18 Oct 2021 11:42    142    |  110    |  28     ----------------------------<  100    3.7     |  25     |  2.20   16 Oct 2021 09:16    140    |  110    |  32     ----------------------------<  116    3.9     |  23     |  2.50     Ca    9.5        18 Oct 2021 11:42  Ca    9.0        16 Oct 2021 09:16  Mg     2.3       18 Oct 2021 11:42    TPro  7.9    /  Alb  3.1    /  TBili  0.4    /  DBili  x      /  AST  19     /  ALT  24     /  AlkPhos  50     18 Oct 2021 11:42  TPro  6.7    /  Alb  2.7    /  TBili  0.3    /  DBili  x      /  AST  13     /  ALT  22     /  AlkPhos  39     16 Oct 2021 09:16    PT/INR - ( 18 Oct 2021 11:42 )   PT: 18.9 sec;   INR: 1.65 ratio         PTT - ( 18 Oct 2021 11:42 )  PTT:43.0 sec  Troponin I, Serum: .059 ng/mL (10-18-21 @ 11:42)  Creatine Kinase, Serum: 46 U/L (10-14-21 @ 00:59)  Troponin I, Serum: .055 ng/mL (10-14-21 @ 00:59)    Serum Pro-Brain Natriuretic Peptide: 93757 pg/mL (10-18-21 @ 11:42)  Serum Pro-Brain Natriuretic Peptide: 7438 pg/mL (10-07-21 @ 04:23)  Serum Pro-Brain Natriuretic Peptide: 8061 pg/mL (10-06-21 @ 21:49)    Blood Culture:   Thyroid Stimulating Hormone, Serum: 0.89 uIU/mL (10-12-21 @ 07:27)      Cholesterol, Serum: 174 mg/dL (10-07-21 @ 11:54)  HDL Cholesterol, Serum: 42 mg/dL (10-07-21 @ 11:54)      12 Lead ECG:   Ventricular Rate 74 BPM  Atrial Rate 70 BPM  QRS Duration 114 ms  Q-T Interval 416 ms  QTC Calculation(Bazett) 461 ms  R Axis -10 degrees  T Axis 52 degrees    Diagnosis Line Atrial fibrillation  Incomplete right bundle branch block  Moderate voltage criteria for LVH, may be normal variant  Nonspecific T wave abnormality  Prolonged QT  Abnormal ECG  Confirmed by Victorino Valdovinos MD ([id number]) on 10/18/2021 1:39:20 PM (10-18-21 @ 12:24)    < from: US Duplex Venous Lower Ext Complete, Bilateral (10.18.21 @ 14:03) >    EXAM:  US DPLX LWR EXT VEINS COMPL BI                            PROCEDURE DATE:  10/18/2021          INTERPRETATION:  CLINICAL INFORMATION: Lower extremity edema    COMPARISON: 07/20/2021.    TECHNIQUE: Duplex sonography of the BILATERAL LOWER extremity veins with color and spectral Doppler, with and without compression.    FINDINGS:    RIGHT:  Normal compressibility of the RIGHT common femoral, femoral and popliteal veins.  Doppler examination shows normal spontaneous and phasic flow.  No RIGHT calf vein thrombosis is detected.  Right popliteal fluid collection measures 4.2 x 3.3 x 1.3 cm.    LEFT:  Normal compressibility of the LEFT common femoral, femoral and popliteal veins.  Doppler examination shows normal spontaneous and phasic flow.  No LEFT calf vein thrombosis is detected.  Left popliteal fossa fluid collection measuring 4.9 x 2.8 x 1.3 cm.    IMPRESSION:  No evidence of deep venous thrombosis in either lower extremity.  Bilateral popliteal fossa fluid collections, likely Baker's cysts.        --- End of Report ---            ROMMEL GALEANO MD; Attending Radiologist  This document has been electronically signed. Oct 18 2021  2:21PM    < end of copied text >  < from: Xray Chest 1 View-PORTABLE IMMEDIATE (10.18.21 @ 12:58) >    EXAM:  XR CHEST PORTABLE IMMED 1V                            PROCEDURE DATE:  10/18/2021          INTERPRETATION:  INDICATION: Sepsis    PRIORS: 10/6/2021    VIEWS: Portable AP radiography of the chest performed.    FINDINGS: Enlargement of the cardiac silhouette, likely related to cardiomegaly. No superior mediastinal widening. Bilateral lung parenchymal airspace opacities, increased from prior. Small pleural effusions suggested. No evidence for pneumothorax. No mediastinal shift. No acute osseous fractures.    IMPRESSION: Bilateral lung parenchymal airspace opacities, increased from prior. Small pleural effusions suggested.    --- End of Report ---            JOAQUIN PATEL MD; Attending Radiologist  This document has been electronically signed. 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 He was discharged 2 days ago. In ED Patient seen and examined, awake, alert, no complaints of chest pain, palpitations or dizziness c/o dyspnea. Tolerating O2 4L via NC. VS: 180/84 HR: 81, EKG noted Afib 80's     Allergies  No Known Allergies  Intolerances      PAST MEDICAL & SURGICAL HISTORY:  HTN (hypertension)  c/b multiple episodes of hypertensive urgency  HLD (hyperlipidemia)  Atrial fibrillation  first documented on EKG 10/7/2021  CAD (coronary artery disease)  s/p stents (not on plavix)  Type 2 diabetes mellitus  not on home insulin  Anxiety  multiple psych medications  History of diverticulitis  07/2021  Diverticulosis  c/b GIB in 2020  Afib  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  Had surgery ( April 2013 )  S/P tonsillectomy  S/P arthroscopic knee surgery  Bilateral ( 2005 )  Torsion of testicle  Had surgery at age 14  Pilonidal cyst  Had surgery ( 1969 )  S/P cataract surgery  Bilateral  H/O hernia repair    MEDICATIONS  (STANDING):  hydrALAZINE Injectable 10 milliGRAM(s) IV Push once  vancomycin  IVPB 1000 milliGRAM(s) IV Intermittent Once    MEDICATIONS  (PRN):    FAMILY HISTORY:  FHx: throat cancer    No family history of colorectal cancer      SOCIAL HISTORY  Lives on a group home     SUBSTANCE USE  Tobacco Usage:  ( x) None ( ) never smoked   ( ) former smoker  ( ) current smoker; Packs per day:   Alcohol Usage: (x ) none  ( ) occasional ( ) 2-3 times a week ( ) daily; Last drink:   Recreational drugs (x ) None    REVIEW OF SYSTEMS   CONSTITUTIONAL: No fevers, No chills, No fatigue, No weight gain  EYES: No vision changes, No vertigo, No throat pain   ENT: No congestion, No ear pain, No sore throat.  NECK: No pain, No stiffness  RESPIRATORY: +shortness of breath, No cough, No wheezing, No hemoptysis  CARDIOVASCULAR: No chest pain. No palpitations, No VALDEZ, No orthopnea, No PND, No pleuritic pain  GASTROINTESTINAL: No abdominal pain, No nausea, No vomiting, No hematemesis, No diarrhea No constipation. No melena  GENITOURINARY: No dysuria, No frequency, No incontinence, No hematuria  NEUROLOGICAL: No dizziness, No lightheadedness, No syncope, No LOC, No headache, No numbness, No weakness  MUSCULOSKELETAL: No joint pain, No joint swelling.  PSYCHIATRIC: No anxiety, No depression  DERMATOLOGY: No diaphoresis. No itching, No rashes, No pressure ulcers  HEME/LYMPH: No easy bruising, or bleeding gums  All other review of systems is negative unless indicated above.    VITAL SIGNS:   Vital Signs Last 24 Hrs  T(C): 36.7 (18 Oct 2021 11:15), Max: 36.7 (18 Oct 2021 10:54)  T(F): 98 (18 Oct 2021 11:15), Max: 98 (18 Oct 2021 10:54)  HR: 78 (18 Oct 2021 14:26) (75 - 79)  BP: 203/92 (18 Oct 2021 14:26) (203/92 - 210/96)  RR: 20 (18 Oct 2021 14:26) (17 - 20)  SpO2: 100% (18 Oct 2021 14:26) (89% - 100%)    Physical Exam:    Appearance: NAD, no distress, alert,   HEENT: Moist Mucous Membranes, Anicteric  Cardiovascular: Regular rate and rhythm, Normal S1 S2, No JVD, No murmurs, No rubs, gallops or clicks  Respiratory: Lungs clear to auscultation diminished at the bases.  No rales, No rhonchi, No wheezing. No tenderness to palpation  Gastrointestinal:  Soft, Non-tender, + BS  Neurologic: Non-focal  Skin: Warm and dry, No rashes, No ecchymosis, No cyanosis, No ulcers   Musculoskeletal: No clubbing, No cyanosis  Vascular: Peripheral pulses palpable 2+ bilaterally  Psychiatry: Mood & affect appropriate  Lymph: +3 B/L LE pitting edema.     I&O's Summary      LABS: All Labs Reviewed:                        9.6    7.53  )-----------( 340      ( 18 Oct 2021 11:42 )             30.6                         8.5    6.09  )-----------( 278      ( 16 Oct 2021 08:52 )             28.1     18 Oct 2021 11:42    142    |  110    |  28     ----------------------------<  100    3.7     |  25     |  2.20   16 Oct 2021 09:16    140    |  110    |  32     ----------------------------<  116    3.9     |  23     |  2.50     Ca    9.5        18 Oct 2021 11:42  Ca    9.0        16 Oct 2021 09:16  Mg     2.3       18 Oct 2021 11:42    TPro  7.9    /  Alb  3.1    /  TBili  0.4    /  DBili  x      /  AST  19     /  ALT  24     /  AlkPhos  50     18 Oct 2021 11:42  TPro  6.7    /  Alb  2.7    /  TBili  0.3    /  DBili  x      /  AST  13     /  ALT  22     /  AlkPhos  39     16 Oct 2021 09:16    PT/INR - ( 18 Oct 2021 11:42 )   PT: 18.9 sec;   INR: 1.65 ratio         PTT - ( 18 Oct 2021 11:42 )  PTT:43.0 sec  Troponin I, Serum: .059 ng/mL (10-18-21 @ 11:42)  Creatine Kinase, Serum: 46 U/L (10-14-21 @ 00:59)  Troponin I, Serum: .055 ng/mL (10-14-21 @ 00:59)    Serum Pro-Brain Natriuretic Peptide: 90873 pg/mL (10-18-21 @ 11:42)  Serum Pro-Brain Natriuretic Peptide: 7438 pg/mL (10-07-21 @ 04:23)  Serum Pro-Brain Natriuretic Peptide: 8061 pg/mL (10-06-21 @ 21:49)    Blood Culture:   Thyroid Stimulating Hormone, Serum: 0.89 uIU/mL (10-12-21 @ 07:27)      Cholesterol, Serum: 174 mg/dL (10-07-21 @ 11:54)  HDL Cholesterol, Serum: 42 mg/dL (10-07-21 @ 11:54)      12 Lead ECG:   Ventricular Rate 74 BPM  Atrial Rate 70 BPM  QRS Duration 114 ms  Q-T Interval 416 ms  QTC Calculation(Bazett) 461 ms  R Axis -10 degrees  T Axis 52 degrees    Diagnosis Line Atrial fibrillation  Incomplete right bundle branch block  Moderate voltage criteria for LVH, may be normal variant  Nonspecific T wave abnormality  Prolonged QT  Abnormal ECG  Confirmed by Victorino Valdovinos MD ([id number]) on 10/18/2021 1:39:20 PM (10-18-21 @ 12:24)    < from: US Duplex Venous Lower Ext Complete, Bilateral (10.18.21 @ 14:03) >    EXAM:  US DPLX LWR EXT VEINS COMPL BI                            PROCEDURE DATE:  10/18/2021          INTERPRETATION:  CLINICAL INFORMATION: Lower extremity edema    COMPARISON: 07/20/2021.    TECHNIQUE: Duplex sonography of the BILATERAL LOWER extremity veins with color and spectral Doppler, with and without compression.    FINDINGS:    RIGHT:  Normal compressibility of the RIGHT common femoral, femoral and popliteal veins.  Doppler examination shows normal spontaneous and phasic flow.  No RIGHT calf vein thrombosis is detected.  Right popliteal fluid collection measures 4.2 x 3.3 x 1.3 cm.    LEFT:  Normal compressibility of the LEFT common femoral, femoral and popliteal veins.  Doppler examination shows normal spontaneous and phasic flow.  No LEFT calf vein thrombosis is detected.  Left popliteal fossa fluid collection measuring 4.9 x 2.8 x 1.3 cm.    IMPRESSION:  No evidence of deep venous thrombosis in either lower extremity.  Bilateral popliteal fossa fluid collections, likely Baker's cysts.        --- End of Report ---            ROMMEL GALEANO MD; Attending Radiologist  This document has been electronically signed. Oct 18 2021  2:21PM    < end of copied text >  < from: Xray Chest 1 View-PORTABLE IMMEDIATE (10.18.21 @ 12:58) >    EXAM:  XR CHEST PORTABLE IMMED 1V                            PROCEDURE DATE:  10/18/2021          INTERPRETATION:  INDICATION: Sepsis    PRIORS: 10/6/2021    VIEWS: Portable AP radiography of the chest performed.    FINDINGS: Enlargement of the cardiac silhouette, likely related to cardiomegaly. No superior mediastinal widening. Bilateral lung parenchymal airspace opacities, increased from prior. Small pleural effusions suggested. No evidence for pneumothorax. No mediastinal shift. No acute osseous fractures.    IMPRESSION: Bilateral lung parenchymal airspace opacities, increased from prior. Small pleural effusions suggested.    --- End of Report ---            JOAQUIN PATEL MD; Attending Radiologist  This document has been electronically signed. Oct 18 2021  1:17PM    < end of copied text >

## 2021-10-18 NOTE — H&P ADULT - ATTENDING COMMENTS
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 Pt was already Rxed for PNA with IV Zosyn last week,  Admitted for ac on Chronic Diastolic  CHF exacerbation and HTN urgency.  Pt seen, Examined, case & care plan d/w pt, residents at detail.  -Concern for NON Compliance with meds /supervision at group home.  Consults -  Pulmonary-Dr Day  Cardio-DR Bliss group  Psych-DR Winkler   Plan for CT Chest -NOn cont   AM Labs  PT Evla  Social service Eval.

## 2021-10-18 NOTE — H&P ADULT - PROBLEM SELECTOR PLAN 3
- Pt was recent [facility] from [facility]16 and treated for PNA with Augmentin  - Although CxR shows bilateral lung parenchymal airspace opacities, increased from prior pt does not have leukocytosis or fever and it is suspected SOB is more so 2/2 CHF exacerbation. CxR findings are suspected to be from previously treated PNA  - s/p vanc and zosyn in ED. will hold off on further Abx at this time.  - F/u Cultures - Pt was recent admitted to [facility] from 10/7-10/16 and treated for PNA with Augmentin  - Although CxR shows bilateral lung parenchymal airspace opacities, increased from prior pt does not have leukocytosis or fever and it is suspected SOB is more so 2/2 CHF exacerbation. CxR findings are suspected to be from previously treated PNA  - s/p vanc and zosyn in ED. will hold off on further Abx at this time.  - F/u am CT Chest as pt refusing at this time  - F/u Cultures  - official S&S ordered  - Pulm, Dr. Day, following - Pt was recent admitted to [facility] from 10/7-10/16 and treated for PNA with IV Zosyn & Augmentin x 7 days   - Although CxR shows bilateral lung parenchymal airspace opacities, increased from prior pt does not have leukocytosis or fever and it is suspected SOB is more so 2/2 CHF exacerbation. CxR findings are suspected to be from previously treated PNA  - s/p vanc and zosyn in ED. will hold off on further Abx at this time.as per Pulmonary Dr Day   - F/u am CT Chest as pt refusing at this time  - F/u Cultures  - official S&S ordered  - Pulm, Dr. Day, following  Continue Symbicort 2x day

## 2021-10-18 NOTE — ED PROVIDER NOTE - OBJECTIVE STATEMENT
Pt is a 77 yo male who presents to the ED with a cc of SOB. PMHx of anxiety multiple psych medications, Atrial fibrillation first documented on EKG 10/7/2021  CAD (coronary artery disease) s/p stents (not on plavix), Diverticulosis c/b GIB in 2020, History of diverticulitis 07/2021, HLD (hyperlipidemia), HTN (hypertension) c/o multiple episodes of hypertensive urgency, Type 2 diabetes mellitus not on home insulin. Pt was last admitted to the hospital from 10/7-10/16 with subjective fevers, SOB, weakness, and urinary frequency. Was found to have multifocal pneumonia and treated with course of abx. Pt also underwent ECHO with EF of 55-60 % and mild MR, AS, TR. During this hospital course pt was treated for HTN urgency and was also noted to be anemic. He was transfused 1 unit PRBCs and started on iron.  Pt stabilized and was discharged home. He reports that at time of discharge he was still experiencing SOB which has progressively worsened. SOB most significant on exertion but also increased upon lying flat. Denies fever, chills, N/V, CP, abd pain. Pt does report cough.

## 2021-10-19 LAB
ALBUMIN SERPL ELPH-MCNC: 2.8 G/DL — LOW (ref 3.3–5)
ALP SERPL-CCNC: 45 U/L — SIGNIFICANT CHANGE UP (ref 40–120)
ALT FLD-CCNC: 22 U/L — SIGNIFICANT CHANGE UP (ref 12–78)
ANION GAP SERPL CALC-SCNC: 7 MMOL/L — SIGNIFICANT CHANGE UP (ref 5–17)
AST SERPL-CCNC: 16 U/L — SIGNIFICANT CHANGE UP (ref 15–37)
BASOPHILS # BLD AUTO: 0.03 K/UL — SIGNIFICANT CHANGE UP (ref 0–0.2)
BASOPHILS NFR BLD AUTO: 0.5 % — SIGNIFICANT CHANGE UP (ref 0–2)
BILIRUB SERPL-MCNC: 0.3 MG/DL — SIGNIFICANT CHANGE UP (ref 0.2–1.2)
BUN SERPL-MCNC: 34 MG/DL — HIGH (ref 7–23)
CALCIUM SERPL-MCNC: 8.9 MG/DL — SIGNIFICANT CHANGE UP (ref 8.5–10.1)
CHLORIDE SERPL-SCNC: 110 MMOL/L — HIGH (ref 96–108)
CO2 SERPL-SCNC: 25 MMOL/L — SIGNIFICANT CHANGE UP (ref 22–31)
COVID-19 SPIKE DOMAIN AB INTERP: POSITIVE
COVID-19 SPIKE DOMAIN ANTIBODY RESULT: >250 U/ML — HIGH
CREAT SERPL-MCNC: 2.6 MG/DL — HIGH (ref 0.5–1.3)
CRP SERPL-MCNC: 93 MG/L — HIGH
CULTURE RESULTS: SIGNIFICANT CHANGE UP
EOSINOPHIL # BLD AUTO: 0.19 K/UL — SIGNIFICANT CHANGE UP (ref 0–0.5)
EOSINOPHIL NFR BLD AUTO: 3.1 % — SIGNIFICANT CHANGE UP (ref 0–6)
GLUCOSE SERPL-MCNC: 136 MG/DL — HIGH (ref 70–99)
HCT VFR BLD CALC: 29.3 % — LOW (ref 39–50)
HGB BLD-MCNC: 9 G/DL — LOW (ref 13–17)
IMM GRANULOCYTES NFR BLD AUTO: 0.5 % — SIGNIFICANT CHANGE UP (ref 0–1.5)
LYMPHOCYTES # BLD AUTO: 1.1 K/UL — SIGNIFICANT CHANGE UP (ref 1–3.3)
LYMPHOCYTES # BLD AUTO: 18 % — SIGNIFICANT CHANGE UP (ref 13–44)
MAGNESIUM SERPL-MCNC: 2.3 MG/DL — SIGNIFICANT CHANGE UP (ref 1.6–2.6)
MCHC RBC-ENTMCNC: 26.1 PG — LOW (ref 27–34)
MCHC RBC-ENTMCNC: 30.7 GM/DL — LOW (ref 32–36)
MCV RBC AUTO: 84.9 FL — SIGNIFICANT CHANGE UP (ref 80–100)
MONOCYTES # BLD AUTO: 0.56 K/UL — SIGNIFICANT CHANGE UP (ref 0–0.9)
MONOCYTES NFR BLD AUTO: 9.2 % — SIGNIFICANT CHANGE UP (ref 2–14)
NEUTROPHILS # BLD AUTO: 4.19 K/UL — SIGNIFICANT CHANGE UP (ref 1.8–7.4)
NEUTROPHILS NFR BLD AUTO: 68.7 % — SIGNIFICANT CHANGE UP (ref 43–77)
NRBC # BLD: 0 /100 WBCS — SIGNIFICANT CHANGE UP (ref 0–0)
PHOSPHATE SERPL-MCNC: 4.5 MG/DL — SIGNIFICANT CHANGE UP (ref 2.5–4.5)
PLATELET # BLD AUTO: 324 K/UL — SIGNIFICANT CHANGE UP (ref 150–400)
POTASSIUM SERPL-MCNC: 3.4 MMOL/L — LOW (ref 3.5–5.3)
POTASSIUM SERPL-SCNC: 3.4 MMOL/L — LOW (ref 3.5–5.3)
PROT SERPL-MCNC: 7.1 G/DL — SIGNIFICANT CHANGE UP (ref 6–8.3)
RBC # BLD: 3.45 M/UL — LOW (ref 4.2–5.8)
RBC # FLD: 17.8 % — HIGH (ref 10.3–14.5)
SARS-COV-2 IGG+IGM SERPL QL IA: >250 U/ML — HIGH
SARS-COV-2 IGG+IGM SERPL QL IA: POSITIVE
SODIUM SERPL-SCNC: 142 MMOL/L — SIGNIFICANT CHANGE UP (ref 135–145)
SPECIMEN SOURCE: SIGNIFICANT CHANGE UP
TROPONIN I SERPL-MCNC: 0.08 NG/ML — HIGH (ref 0.01–0.04)
WBC # BLD: 6.1 K/UL — SIGNIFICANT CHANGE UP (ref 3.8–10.5)
WBC # FLD AUTO: 6.1 K/UL — SIGNIFICANT CHANGE UP (ref 3.8–10.5)

## 2021-10-19 PROCEDURE — 99232 SBSQ HOSP IP/OBS MODERATE 35: CPT

## 2021-10-19 RX ORDER — POTASSIUM CHLORIDE 20 MEQ
40 PACKET (EA) ORAL ONCE
Refills: 0 | Status: COMPLETED | OUTPATIENT
Start: 2021-10-19 | End: 2021-10-19

## 2021-10-19 RX ORDER — FUROSEMIDE 40 MG
40 TABLET ORAL EVERY 12 HOURS
Refills: 0 | Status: DISCONTINUED | OUTPATIENT
Start: 2021-10-19 | End: 2021-10-21

## 2021-10-19 RX ADMIN — PREGABALIN 1000 MICROGRAM(S): 225 CAPSULE ORAL at 11:47

## 2021-10-19 RX ADMIN — Medication 100 MILLIGRAM(S): at 21:11

## 2021-10-19 RX ADMIN — ISOSORBIDE MONONITRATE 30 MILLIGRAM(S): 60 TABLET, EXTENDED RELEASE ORAL at 11:47

## 2021-10-19 RX ADMIN — Medication 100 MILLIGRAM(S): at 05:11

## 2021-10-19 RX ADMIN — FAMOTIDINE 20 MILLIGRAM(S): 10 INJECTION INTRAVENOUS at 11:46

## 2021-10-19 RX ADMIN — Medication 100 MILLIGRAM(S): at 13:44

## 2021-10-19 RX ADMIN — Medication 8 MILLIGRAM(S): at 21:12

## 2021-10-19 RX ADMIN — Medication 0.1 MILLIGRAM(S): at 13:44

## 2021-10-19 RX ADMIN — ARIPIPRAZOLE 30 MILLIGRAM(S): 15 TABLET ORAL at 11:47

## 2021-10-19 RX ADMIN — Medication 40 MILLIGRAM(S): at 05:11

## 2021-10-19 RX ADMIN — LAMOTRIGINE 100 MILLIGRAM(S): 25 TABLET, ORALLY DISINTEGRATING ORAL at 11:47

## 2021-10-19 RX ADMIN — BUDESONIDE AND FORMOTEROL FUMARATE DIHYDRATE 2 PUFF(S): 160; 4.5 AEROSOL RESPIRATORY (INHALATION) at 05:12

## 2021-10-19 RX ADMIN — Medication 0.1 MILLIGRAM(S): at 05:11

## 2021-10-19 RX ADMIN — APIXABAN 5 MILLIGRAM(S): 2.5 TABLET, FILM COATED ORAL at 05:11

## 2021-10-19 RX ADMIN — Medication 1 MILLIGRAM(S): at 11:46

## 2021-10-19 RX ADMIN — BUDESONIDE AND FORMOTEROL FUMARATE DIHYDRATE 2 PUFF(S): 160; 4.5 AEROSOL RESPIRATORY (INHALATION) at 17:40

## 2021-10-19 RX ADMIN — ATORVASTATIN CALCIUM 10 MILLIGRAM(S): 80 TABLET, FILM COATED ORAL at 21:12

## 2021-10-19 RX ADMIN — Medication 81 MILLIGRAM(S): at 11:47

## 2021-10-19 RX ADMIN — Medication 0.1 MILLIGRAM(S): at 21:11

## 2021-10-19 RX ADMIN — Medication 60 MILLIGRAM(S): at 21:12

## 2021-10-19 RX ADMIN — AMLODIPINE BESYLATE 10 MILLIGRAM(S): 2.5 TABLET ORAL at 21:11

## 2021-10-19 RX ADMIN — MIRTAZAPINE 30 MILLIGRAM(S): 45 TABLET, ORALLY DISINTEGRATING ORAL at 21:11

## 2021-10-19 RX ADMIN — Medication 40 MILLIGRAM(S): at 17:39

## 2021-10-19 RX ADMIN — Medication 150 MILLIGRAM(S): at 11:46

## 2021-10-19 RX ADMIN — APIXABAN 5 MILLIGRAM(S): 2.5 TABLET, FILM COATED ORAL at 17:40

## 2021-10-19 NOTE — DIETITIAN INITIAL EVALUATION ADULT. - PROBLEM SELECTOR PLAN 6
- CKD 3-4- Cr stable   - baseline Cr appears to be 1.6 - 2.3  - Cr on admission 2.2; improved from 2.5 on 10/16  - Avoid nephrotoxic medications  - Trend BMP  - NephDr. Cayden white consulted by ED [place] Counseling: I discussed with the patient the risks of ixekizumab including but not limited to immunosuppression, serious infections, worsening of inflammatory bowel disease and drug reactions.  The patient understands that monitoring is required including a PPD at baseline and must alert us or the primary physician if symptoms of infection or other concerning signs are noted.

## 2021-10-19 NOTE — PHYSICAL THERAPY INITIAL EVALUATION ADULT - ADDITIONAL COMMENTS
Instructions: This plan will send the code [id number] to the PM system.  DO NOT or CHANGE the price.
Price (Do Not Change): 0.00
Detail Level: Simple
Pt has been using a rolling walker since his last recent admisssion

## 2021-10-19 NOTE — PROGRESS NOTE ADULT - SUBJECTIVE AND OBJECTIVE BOX
[facility] -- Fifi Bliss, Kalpesh Valdovinos, Abraham Sheridan Savella  Office # [phone]      Follow Up:    Afib LE edema     Subjective/Observations:   Seen at bedside sitting in chair , no further complaints of chest pain + dyspnea + LE edema   no dizziness palpitations or syncope     REVIEW OF SYSTEMS: All other review of systems is negative unless indicated above    PAST MEDICAL & SURGICAL HISTORY:  HTN (hypertension)  c/b multiple episodes of hypertensive urgency    HLD (hyperlipidemia)    Atrial fibrillation  first documented on EKG 10/7/2021    CAD (coronary artery disease)  s/p stents (not on plavix)    Type 2 diabetes mellitus  not on home insulin/ Meds    Anxiety  Depression  multiple psych medications    History of diverticulitis  07/2021    Diverticulosis  c/b GIB in 2020    Afib  on AC    Stage 3 chronic kidney disease    Anemia of chronic disease  Monoclonal Gammopathy-MGUS  pRBC transfusion 10/15/21    Chronic diastolic congestive heart failure    Multiple thyroid nodules    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  Had surgery ( April 2013 )    S/P tonsillectomy    S/P arthroscopic knee surgery  Bilateral ( 2005 )    Torsion of testicle  Had surgery at age 14    Pilonidal cyst  Had surgery ( 1969 )    S/P cataract surgery  Bilateral    H/O hernia repair        MEDICATIONS  (STANDING):  amLODIPine   Tablet 10 milliGRAM(s) Oral daily  apixaban 5 milliGRAM(s) Oral every 12 hours  ARIPiprazole 30 milliGRAM(s) Oral daily  aspirin enteric coated 81 milliGRAM(s) Oral daily  atorvastatin 10 milliGRAM(s) Oral at bedtime  budesonide 160 MICROgram(s)/formoterol 4.5 MICROgram(s) Inhaler 2 Puff(s) Inhalation two times a day  cloNIDine 0.1 milliGRAM(s) Oral three times a day  cyanocobalamin 1000 MICROGram(s) Oral daily  doxazosin 8 milliGRAM(s) Oral at bedtime  famotidine    Tablet 20 milliGRAM(s) Oral daily  folic acid 1 milliGRAM(s) Oral daily  furosemide   Injectable 40 milliGRAM(s) IV Push daily  hydrALAZINE 100 milliGRAM(s) Oral three times a day  isosorbide   mononitrate ER Tablet (IMDUR) 30 milliGRAM(s) Oral daily  lamoTRIgine 100 milliGRAM(s) Oral daily  mirtazapine 30 milliGRAM(s) Oral at bedtime  NIFEdipine XL 60 milliGRAM(s) Oral daily  venlafaxine XR. 150 milliGRAM(s) Oral daily    MEDICATIONS  (PRN):      Allergies    No Known Allergies    Intolerances        Vital Signs Last 24 Hrs  T(C): 36.7 (19 Oct 2021 05:10), Max: 36.9 (18 Oct 2021 21:45)  T(F): 98 (19 Oct 2021 05:10), Max: 98.4 (18 Oct 2021 21:45)  HR: 66 (19 Oct 2021 05:10) (62 - 82)  BP: 130/72 (19 Oct 2021 05:10) (130/72 - 210/96)  BP(mean): --  RR: 17 (19 Oct 2021 05:10) (17 - 20)  SpO2: 96% (19 Oct 2021 05:10) (89% - 100%)    I&O's Summary    Weight (kg): 104.4 (10-18 @ 21:45)    PHYSICAL EXAM:  TELE: Af  Constitutional: NAD, awake and alert, obese   HEENT: Moist Mucous Membranes, Anicteric  Pulmonary: Non-labored, breath sounds are Diminished at bases bilaterally   Cardiovascular: IRRgular, S1 and S2 nl, No murmurs, rubs, gallops or clicks  Gastrointestinal: Bowel Sounds present, soft, nontender.   Lymph: + bilateral peripheral edema.  Skin: No visible rashes or ulcers.  Psych:  Mood & affect appropriate    LABS: All Labs Reviewed:                        9.0    6.10  )-----------( 324      ( 19 Oct 2021 08:05 )             29.3                         9.6    7.53  )-----------( 340      ( 18 Oct 2021 11:42 )             30.6     19 Oct 2021 08:05    142    |  110    |  34     ----------------------------<  136    3.4     |  25     |  2.60   18 Oct 2021 11:42    142    |  110    |  28     ----------------------------<  100    3.7     |  25     |  2.20   16 Oct 2021 09:16    140    |  110    |  32     ----------------------------<  116    3.9     |  23     |  2.50     Ca    8.9        19 Oct 2021 08:05  Ca    9.5        18 Oct 2021 11:42  Ca    9.0        16 Oct 2021 09:16  Phos  4.5       19 Oct 2021 08:05  Mg     2.3       19 Oct 2021 08:05  Mg     2.3       18 Oct 2021 11:42    TPro  7.1    /  Alb  2.8    /  TBili  0.3    /  DBili  x      /  AST  16     /  ALT  22     /  AlkPhos  45     19 Oct 2021 08:05  TPro  7.9    /  Alb  3.1    /  TBili  0.4    /  DBili  x      /  AST  19     /  ALT  24     /  AlkPhos  50     18 Oct 2021 11:42  TPro  6.7    /  Alb  2.7    /  TBili  0.3    /  DBili  x      /  AST  13     /  ALT  22     /  AlkPhos  39     16 Oct 2021 09:16    PT/INR - ( 18 Oct 2021 11:42 )   PT: 18.9 sec;   INR: 1.65 ratio         PTT - ( 18 Oct 2021 11:42 )  PTT:43.0 sec  CARDIAC MARKERS ( 19 Oct 2021 02:22 )  .084 ng/mL / x     / x     / x     / x      CARDIAC MARKERS ( 18 Oct 2021 20:09 )  .086 ng/mL / x     / 90 U/L / x     / 1.7 ng/mL  CARDIAC MARKERS ( 18 Oct 2021 11:42 )  .059 ng/mL / x     / x     / x     / x          12 Lead ECG:   Ventricular Rate 74 BPM  Atrial Rate 70 BPM  QRS Duration 114 ms  Q-T Interval 416 ms  QTC Calculation(Bazett) 461 ms  R Axis -10 degrees  T Axis 52 degrees    Diagnosis Line Atrial fibrillation  Incomplete right bundle branch block  Moderate voltage criteria for LVH, may be normal variant  Nonspecific T wave abnormality  Prolonged QT  Abnormal ECG  Confirmed by Bonifacio BARRERA, Victorino ([id number]) on 10/18/2021 1:39:20 PM (10-18-21 @ 12:24)    < from: US Duplex Venous Lower Ext Complete, Bilateral (10.18.21 @ 14:03) >    EXAM:  US DPLX LWR EXT VEINS COMPL BI                            PROCEDURE DATE:  10/18/2021          INTERPRETATION:  CLINICAL INFORMATION: Lower extremity edema    COMPARISON: 07/20/2021.    TECHNIQUE: Duplex sonography of the BILATERAL LOWER extremity veins with color and spectral Doppler, with and without compression.    FINDINGS:    RIGHT:  Normal compressibility of the RIGHT common femoral, femoral and popliteal veins.  Doppler examination shows normal spontaneous and phasic flow.  No RIGHT calf vein thrombosis is detected.  Right popliteal fluid collection measures 4.2 x 3.3 x 1.3 cm.    LEFT:  Normal compressibility of the LEFT common femoral, femoral and popliteal veins.  Doppler examination shows normal spontaneous and phasic flow.  No LEFT calf vein thrombosis is detected.  Left popliteal fossa fluid collection measuring 4.9 x 2.8 x 1.3 cm.    IMPRESSION:  No evidence of deep venous thrombosis in either lower extremity.  Bilateral popliteal fossa fluid collections, likely Baker's cysts.        --- End of Report ---            ROMMEL GALEANO MD; Attending Radiologist  This document has been electronically signed. Oct 18 2021  2:21PM    < end of copied text >  < from: Xray Chest 1 View-PORTABLE IMMEDIATE (10.18.21 @ 12:58) >    EXAM:  XR CHEST PORTABLE IMMED 1V                            PROCEDURE DATE:  10/18/2021          INTERPRETATION:  INDICATION: Sepsis    PRIORS: 10/6/2021    VIEWS: Portable AP radiography of the chest performed.    FINDINGS: Enlargement of the cardiac silhouette, likely related to cardiomegaly. No superior mediastinal widening. Bilateral lung parenchymal airspace opacities, increased from prior. Small pleural effusions suggested. No evidence for pneumothorax. No mediastinal shift. No acute osseous fractures.    IMPRESSION: Bilateral lung parenchymal airspace opacities, increased from prior. Small pleural effusions suggested.

## 2021-10-19 NOTE — SWALLOW BEDSIDE ASSESSMENT ADULT - SWALLOW EVAL: RECOMMENDED FEEDING/EATING TECHNIQUES
allow for swallow between intakes/alternate food with liquid/crush medication (when feasible)/hard swallow w/ each bite or sip/maintain upright posture during/after eating for 30 mins/no straws/oral hygiene/position upright (90 degrees)/small sips/bites

## 2021-10-19 NOTE — DIETITIAN INITIAL EVALUATION ADULT. - PERTINENT LABORATORY DATA
10/19 H/h 9.0/29.3  K 3.4 Cl 110   BUN/Crea 34/2.6   Glu 136  10/8 B12/folate normal  ferritin 28 low

## 2021-10-19 NOTE — PROGRESS NOTE ADULT - ASSESSMENT
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) living in a group home [facility] house    completed ABX for PNA  ct chest from recent admission reviewed, cxr noted, repeat CT chest reviewed - multifocal pna reported - however - clinically does not correlate -   would monitor off ABX - biomarkers - cx - monitor VS and Sat  assess - eval for HF - vol overload - Diuresis - I and O - cardio eval - TTE reviewed  ELMER - does not tolerate CPAP  CKD - monitor renal indices - i and o - replete lytes  dysphagia diet - asp prec - HOB elev - oral hygiene  assist with needs - ADL  emotional support  tele monitor  old records reviewed

## 2021-10-19 NOTE — PROGRESS NOTE ADULT - PROBLEM SELECTOR PLAN 2
- Pt presented with /96 --- unclear if pt is compliant with his medications as he does not appear to have a good understanding of his medical conditions --- Psych, Dr. Winkler, consulted for assessment of capacity  - No vision changes, headache, ARIELLA, current CP  - s/p Hydralazine 10mg IVP x2 SBP improved to 160's  - C/w with home antihypertensives: amlodipine, procardia, clonidine, hydralazine, Imdur  - monitor routine hemodynamics  - Cardio, Dr. Bliss's group, following. - Pt presented with /96 --- unclear if pt is compliant with his medications as he does not appear to have a good understanding of his medical conditions --- Psych, Dr. Winkler, consulted for assessment of capacity  - No vision changes, headache, ARIELLA, current CP, ECHO done.  - s/p Hydralazine 10mg IVP x2 SBP improved to 160's  - C/w with home antihypertensives:   -  Continue clonidine 0.1 TID  - On hydralazine 100 mg q 8 hrs   - continue amlodipine 10 mg qd  - continue nifedipine XL  - continue  Imdur 30mg   - Cardio, Dr. Bliss's group, following.

## 2021-10-19 NOTE — PROGRESS NOTE ADULT - PROBLEM SELECTOR PLAN 5
- chronic  - tele  - EKG: HR: 74, Atrial fibrillation, Incomplete right bundle branch block, Nonspecific T wave abnormality, Prolonged QT(461)  - off digoxin and labetalol since last admission 2/2 Bradycardia & Pauses -- remains rate controlled  - AC with Eliquis 5mg BID   - Cardiology, Dr. Bliss's, group following. - chronic- tele  - EKG: HR: 74, Atrial fibrillation, Incomplete right bundle branch block, Nonspecific T wave abnormality, Prolonged QT(461)  - off digoxin and labetalol since last admission 2/2 Bradycardia & Pauses -- remains rate controlled  - AC with Eliquis 5mg BID   - Cardiology, Dr. Bliss's, group following.

## 2021-10-19 NOTE — PROGRESS NOTE ADULT - ASSESSMENT
75 yo M with PMHx of Type 2 DM (not on insulin), BPH, CAD s/p stents >4 years ago (not on plavix),  Chronic Diastolic CHF ,Anemia -MGUS ,Thyroid Nodule  diverticulitis (hospitalized 07/[facility]), GIB 2/2 diverticulosis (2020), anxiety/ depression , Lupus, HTN, HLD, CKD stage 3, HepC, hx of blood clots in brain (s/p surgery 2013) living [facility] admitted for CHF exacerbation and HTN urgency.

## 2021-10-19 NOTE — PROGRESS NOTE ADULT - ASSESSMENT
77 yo M with PMHx of Type 2 DM (not on insulin), BPH, CAD s/p stents >4 years ago (not on plavix), diverticulitis (hospitalized 07/2020 at [facility]), GIB 2/2 diverticulosis (2020), anxiety, Lupus, HTN, HLD, CKD stage 3, HepC, hx of blood clots in brain (s/p surgery 2013) living in a group home [facility] presenting to [facility] today with shortness of breath, chest pain that started upon discharge 2 days ago with complaints of oral bleed that started today. Cardiology consulted for SOB and chest pain    SOB   - ECHO LVEF 55-60%.LV diastolic function cannot determine due to atrial fibrillation.Mild mitral regurgitation is present.Mild aortic stenosis is  present.Mild tricuspid regurgitation is present . No evidence of any pulmonary hypertension  - CXR showed: mild pleural effusion   - BNP: 60764 hypervolemic on exam with LE edema   -give additional 40mg IV lasix today   -obtain renal consult  - Daily weight,. Monitor Strict I/O's     Chest pain (Atypical)  - Patient presented with chest discomfort, resolved at present  - EKG showed Afib, no ischemic changes noted  - mild troponin leak likley in setting of ARIELLA, peaked no need to further trend     Afib   - continue thromboembolism ppx with eliquis bid   -rate controlled af 67     Hypertensive urgency.   - On admission /90/ given hydralazine in ED 10 mg IV X2 doses  - recently admitted with hypertensive urgency on 10/2021  -now improved 130./72   - Continue clonidine 0.1 TID  - On hydralazine 100 mg q 8 hrs   - continue amlodipine 10 mg qd  - continue nifedipine XL  - continue  Imdur 30mg     CAD with stent placement   - continue with ASA  - continue statin      - All other medical needs as per primary team.  - Other cardiovascular workup will depend on clinical course.  - Will continue to follow.    Carmelina Leger FNP-C  Cardiology NP  [facility] [id number] [phone]

## 2021-10-19 NOTE — PROGRESS NOTE ADULT - SUBJECTIVE AND OBJECTIVE BOX
Date/Time Patient Seen:  		  Referring MD:   Data Reviewed	       Patient is a 76y old  Male who presents with a chief complaint of     Subjective/HPI     PAST MEDICAL & SURGICAL HISTORY:  Hypertension    Diabetes mellitus    Lupus    Hepatitis C    Anxiety and depression    CAD (coronary artery disease)  s/p stents    Diverticulosis    Hyperlipidemia    HTN (hypertension)  c/b multiple episodes of hypertensive urgency    HLD (hyperlipidemia)    Atrial fibrillation  first documented on EKG 10/7/2021    CAD (coronary artery disease)  s/p stents (not on plavix)    Type 2 diabetes mellitus  not on home insulin/ Meds    Anxiety  Depression  multiple psych medications    History of diverticulitis  07/2021    Diverticulosis  c/b GIB in 2020    Afib  on AC    Stage 3 chronic kidney disease    Anemia of chronic disease  Monoclonal Gammopathy-MGUS  pRBC transfusion 10/15/21    Chronic diastolic congestive heart failure    Multiple thyroid nodules    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  Had surgery ( April 2013 )    S/P tonsillectomy    S/P arthroscopic knee surgery  Bilateral ( 2005 )    Torsion of testicle  Had surgery at age 14    Pilonidal cyst  Had surgery ( 1969 )    S/P cataract surgery  Bilateral    H/O hernia repair          Medication list         MEDICATIONS  (STANDING):  amLODIPine   Tablet 10 milliGRAM(s) Oral daily  apixaban 5 milliGRAM(s) Oral every 12 hours  ARIPiprazole 30 milliGRAM(s) Oral daily  aspirin enteric coated 81 milliGRAM(s) Oral daily  atorvastatin 10 milliGRAM(s) Oral at bedtime  budesonide 160 MICROgram(s)/formoterol 4.5 MICROgram(s) Inhaler 2 Puff(s) Inhalation two times a day  cloNIDine 0.1 milliGRAM(s) Oral three times a day  cyanocobalamin 1000 MICROGram(s) Oral daily  doxazosin 8 milliGRAM(s) Oral at bedtime  famotidine    Tablet 20 milliGRAM(s) Oral daily  folic acid 1 milliGRAM(s) Oral daily  furosemide   Injectable 40 milliGRAM(s) IV Push daily  hydrALAZINE 100 milliGRAM(s) Oral three times a day  isosorbide   mononitrate ER Tablet (IMDUR) 30 milliGRAM(s) Oral daily  lamoTRIgine 100 milliGRAM(s) Oral daily  mirtazapine 30 milliGRAM(s) Oral at bedtime  NIFEdipine XL 60 milliGRAM(s) Oral daily  venlafaxine XR. 150 milliGRAM(s) Oral daily    MEDICATIONS  (PRN):         Vitals log        ICU Vital Signs Last 24 Hrs  T(C): 36.7 (19 Oct 2021 05:10), Max: 36.9 (18 Oct 2021 21:45)  T(F): 98 (19 Oct 2021 05:10), Max: 98.4 (18 Oct 2021 21:45)  HR: 66 (19 Oct 2021 05:10) (62 - 82)  BP: 130/72 (19 Oct 2021 05:10) (130/72 - 210/96)  BP(mean): --  ABP: --  ABP(mean): --  RR: 17 (19 Oct 2021 05:10) (17 - 20)  SpO2: 96% (19 Oct 2021 05:10) (89% - 100%)           Input and Output:  I&O's Detail      Lab Data                        9.6    7.53  )-----------( 340      ( 18 Oct 2021 11:42 )             30.6     10-18    142  |  110<H>  |  28<H>  ----------------------------<  100<H>  3.7   |  25  |  2.20<H>    Ca    9.5      18 Oct 2021 11:42  Mg     2.3     10-18    TPro  7.9  /  Alb  3.1<L>  /  TBili  0.4  /  DBili  x   /  AST  19  /  ALT  24  /  AlkPhos  50  10-18      CARDIAC MARKERS ( 19 Oct 2021 02:22 )  .084 ng/mL / x     / x     / x     / x      CARDIAC MARKERS ( 18 Oct 2021 20:09 )  .086 ng/mL / x     / 90 U/L / x     / 1.7 ng/mL  CARDIAC MARKERS ( 18 Oct 2021 11:42 )  .059 ng/mL / x     / x     / x     / x            Review of Systems	      Objective     Physical Examination    heart s1s2  lung dec BS  abd soft  head nc      Pertinent Lab findings & Imaging      Nikko:  NO   Adequate UO     I&O's Detail           Discussed with:     Cultures:	        Radiology

## 2021-10-19 NOTE — DIETITIAN INITIAL EVALUATION ADULT. - PROBLEM SELECTOR PLAN 3
- Pt was recent admitted to [facility] from 10/7-10/16 and treated for PNA with IV Zosyn & Augmentin x 7 days   - Although CxR shows bilateral lung parenchymal airspace opacities, increased from prior pt does not have leukocytosis or fever and it is suspected SOB is more so 2/2 CHF exacerbation. CxR findings are suspected to be from previously treated PNA  - s/p vanc and zosyn in ED. will hold off on further Abx at this time.as per Pulmonary Dr Day   - F/u am CT Chest as pt refusing at this time  - F/u Cultures  - official S&S ordered  - Pulm, Dr. Day, following  Continue Symbicort 2x day

## 2021-10-19 NOTE — PROGRESS NOTE ADULT - PROBLEM SELECTOR PLAN 4
- troponin on admission 0.059-H/O CAD with Stents   - likely 2/2 demand ischemia; suspect now resolved intermitted CP 2/2 anxiety as it occurred when pt was having imaging done and pt admitted to feeling anxious at that time  - will trend troponin to peak  - EKG: HR: 74, Atrial fibrillation, Incomplete right bundle branch block, Nonspecific T wave abnormality, Prolonged QT(461) -- caution with Qtc prolonging agents   - cardio, Dr. Bliss's group, following  -On EC ASA 81 mg daily  NO BB 2/2 Bradycardia.

## 2021-10-19 NOTE — SWALLOW BEDSIDE ASSESSMENT ADULT - COMMENTS
Consult received and chart reviewed. The patient was seen at chairside this AM for an initial assessment of swallow function, at which time he was alert and cooperative. Patient currently receiving supplemental O2 via NC in place. The patient is admitting to intermittent "choking" and coughing episodes during and in the absence of PO intake. The patient denied pain pre/post today's evaluation.    Per charting, the patient is 77 yo M with PMHx of Type 2 DM (not on insulin), BPH, CAD s/p stents >4 years ago (not on plavix),  Chronic Diastolic CHF ,Anemia -MGUS ,Thyroid Nodule  diverticulitis (hospitalized 07/2020 at [facility]"), GIB 2/2 diverticulosis (2020), anxiety/ depression , Lupus, HTN, HLD, CKD stage 3, HepC, hx of blood clots in brain (s/p surgery 2013) living in a group home [facility] house presenting to the ED with SOB. Pt was Rxed for PNA last week in Hospital. Admitted for CHF exacerbation and HTN urgency."    WBC is WFL. CT of the chest revealed, "1. Small bilateral pleural effusions. 2. Multifocal bilateral pneumonia, organism nonspecific, does not have the characteristic appearance of COVID-19 but is within range of what can be seen with COVID-19 pneumonia."    Discussed results and recommendations from this evaluation with the patient, RN, and call out to MD.

## 2021-10-19 NOTE — PROGRESS NOTE ADULT - SUBJECTIVE AND OBJECTIVE BOX
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 Patient states his SOB started on  and has been worse with laying down. Does admit to intermittent CP associated with the SOB and also when getting imaging as that makes him anxious. Does not appear to have a clear understanding of his medical conditions. Also, states he had some bleeding in his mouth on , but there is no longer bleeding; on exam there is a healing cut on his tongue and inner right cheek with no active bleeding likely from the pt biting his tongue. Admits difficulty laying down straight due to SOB. Denies fevers, chills, abd pain, n/v, sore throat, cough, palpitations  Of note, pt was recently admitted to [facility] on 10/7/21 for hypertensive urgency, Afib and  findings of PNA. was Rxed with IV Zosyn x 7 days  Abx .pt also got 1 u pRBC transfusion past week.    ED Course:   Vitals: BP: 210/96-->181/84, HR: 79, Temp: 98 F, RR: 17, SpO2: 100% on RA-->89% on RA-->98% on 2L NC   Labs: H/H: 9.6/30.6, aPTT: 43, PT/INR: 18.9/1.65, BUN/Cr: 28/2.2, alb: 3.1, GFR: 28, procal: .16, trop: .059, proBNP:10,846  Imaging:   CxR:  Bilateral lung parenchymal airspace opacities, increased from prior. Small pleural effusions suggested  B/l LE Dopplers: No evidence of deep venous thrombosis in either lower extremity.  Bilateral popliteal fossa fluid collections, likely Baker's cysts.  EKG: HR: 74, Atrial fibrillation, Incomplete right bundle branch block, Nonspecific T wave abnormality, Prolonged QT(461)  Received in the ED: Vanco and Zosyn x2, IV lasix 60mg, 10mg Hydralazine IVP x2 (18 Oct 2021 17:24)    INTERVAL HPI:  10/19/21: Patient was seen and examined at bedside. Denies any complaints. States his shortness of breath has improved.     OVERNIGHT EVENTS: no acute overnight events     Home Medications:  Lasix 40 mg oral tablet: 1 tab(s) orally once a day (18 Oct 2021 17:52)  Pepcid 20 mg oral tablet: 1 tab(s) orally every other day (18 Oct 2021 17:52)      MEDICATIONS  (STANDING):  amLODIPine   Tablet 10 milliGRAM(s) Oral daily  apixaban 5 milliGRAM(s) Oral every 12 hours  ARIPiprazole 30 milliGRAM(s) Oral daily  aspirin enteric coated 81 milliGRAM(s) Oral daily  atorvastatin 10 milliGRAM(s) Oral at bedtime  budesonide 160 MICROgram(s)/formoterol 4.5 MICROgram(s) Inhaler 2 Puff(s) Inhalation two times a day  cloNIDine 0.1 milliGRAM(s) Oral three times a day  cyanocobalamin 1000 MICROGram(s) Oral daily  doxazosin 8 milliGRAM(s) Oral at bedtime  famotidine    Tablet 20 milliGRAM(s) Oral daily  folic acid 1 milliGRAM(s) Oral daily  furosemide   Injectable 40 milliGRAM(s) IV Push daily  hydrALAZINE 100 milliGRAM(s) Oral three times a day  isosorbide   mononitrate ER Tablet (IMDUR) 30 milliGRAM(s) Oral daily  lamoTRIgine 100 milliGRAM(s) Oral daily  mirtazapine 30 milliGRAM(s) Oral at bedtime  NIFEdipine XL 60 milliGRAM(s) Oral daily  venlafaxine XR. 150 milliGRAM(s) Oral daily    MEDICATIONS  (PRN):      Allergies    No Known Allergies    Intolerances        Social History:  Tobacco: quit in , not active smoker  EtOH: denies   Recreational drug use: cocaine several years ago "a few times" has not used in "many years"  Lives with: [facility]; group home; no nursing assistance; observation is there  Ambulates: independent, denies walker or cane but uses guard railings  ADLs: independent, but states need for assistance in bathing, cooking; independent with feeding, ambulating  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[place]  Vaccinations: Moderna x2 doses last dose in May (18 Oct 2021 17:24)      REVIEW OF SYSTEMS:  CONSTITUTIONAL: No fever, No chills, No fatigue, No myalgia, No Body ache, No Weakness  EYES: No eye pain,  No visual disturbances, No discharge, NO Redness  ENMT:  No ear pain, No nose bleed, No vertigo; No sinus pain, NO throat pain, No Congestion  NECK: No pain, No stiffness  RESPIRATORY: No cough, NO wheezing, No  hemoptysis, NO  shortness of breath  CARDIOVASCULAR: No chest pain, palpitations  GASTROINTESTINAL: No abdominal pain, NO epigastric pain. No nausea, No vomiting; No diarrhea, No constipation. [  ] BM  GENITOURINARY: No dysuria, No frequency, No urgency, No hematuria, NO incontinence  NEUROLOGICAL: No headaches, No dizziness, No numbness, No tingling, No tremors, No weakness  EXT: No Swelling, No Pain, No Edema  SKIN:  [ x ] No itching, burning, rashes, or lesions   MUSCULOSKELETAL: No joint pain ,No Jt swelling; No muscle pain, No back pain, No extremity pain  PSYCHIATRIC: No depression,  No anxiety,  No mood swings ,No difficulty sleeping at night  PAIN SCALE: [ x ] None  [  ] Other-  ROS Unable to obtain due to - [  ] Dementia  [  ] Lethargy [  ] Drowsy [  ] Sedated [  ] non verbal  REST OF REVIEW Of SYSTEM - [ x ] Normal     Vital Signs Last 24 Hrs  T(C): 36.7 (19 Oct 2021 05:10), Max: 36.9 (18 Oct 2021 21:45)  T(F): 98 (19 Oct 2021 05:10), Max: 98.4 (18 Oct 2021 21:45)  HR: 66 (19 Oct 2021 05:10) (62 - 82)  BP: 130/72 (19 Oct 2021 05:10) (130/72 - 209/96)  BP(mean): --  RR: 17 (19 Oct 2021 05:10) (17 - 20)  SpO2: 96% (19 Oct 2021 05:10) (96% - 100%)  Finger Stick          PHYSICAL EXAM:  GENERAL:  [ x ] NAD , [ x ] well appearing, [  ] Agitated, [  ] Drowsy,  [  ] Lethargy, [  ] confused   HEAD:  [ x ] Normal, [  ] Other  EYES:  [ x ] EOMI, [  ] PERRLA, [ x ] conjunctiva and sclera clear normal, [  ] Other,  [  ] Pallor,[  ] Discharge  ENMT:  [ x ] Normal, [  ] Moist mucous membranes, [  ] Good dentition, [  ] No Thrush  NECK:  [ x ] Supple, [ x ] No JVD, [  ] Normal thyroid, [  ] Lymphadenopathy [  ] Other  CHEST/LUNG:  [ x ] Clear to auscultation bilaterally, [  ] Breath Sounds equal B/L [  ] poor effort  [ x ] No rales, [ x ] No rhonchi  [ x ]  No wheezing,   HEART:  [ x ] Regular rate and rhythm, [  ] tachycardia, [  ] Bradycardia,  [  ] irregular  [ x ] No murmurs, No rubs, No gallops, [  ] PPM in place (Mfr:  )  ABDOMEN:  [ x ] Soft, [ x Nontender, [x  ] Nondistended, [  ] No mass, [  ] Bowel sounds present, [  ] obese  NERVOUS SYSTEM:  [ x ] Alert & Oriented X2, [  ] Nonfocal  [  ] Confusion  [  ] Encephalopathic [  ] Sedated [  ] Unable to assess, [  ] Dementia [  ] Other-  EXTREMITIES: [ x ] 2+ Peripheral Pulses, No clubbing, No cyanosis,  [  ] edema B/L lower EXT. [  ] PVD stasis skin changes B/L Lower EXT, [  ] wound  LYMPH: No lymphadenopathy noted  SKIN:  [  x] No rashes or lesions, [  ] Pressure Ulcers, [  ] ecchymosis, [  ] Skin Tears, [  ] Other    DIET: Diet, Dysphagia 3 Soft-Thin Liquids:   Low Sodium (10-19-21 @ 09:33)      LABS:                        9.0    6.10  )-----------( 324      ( 19 Oct 2021 08:05 )             29.3     19 Oct 2021 08:05    142    |  110    |  34     ----------------------------<  136    3.4     |  25     |  2.60     Ca    8.9        19 Oct 2021 08:05  Phos  4.5       19 Oct 2021 08:05  Mg     2.3       19 Oct 2021 08:05    TPro  7.1    /  Alb  2.8    /  TBili  0.3    /  DBili  x      /  AST  16     /  ALT  22     /  AlkPhos  45     19 Oct 2021 08:05    PT/INR - ( 18 Oct 2021 11:42 )   PT: 18.9 sec;   INR: 1.65 ratio         PTT - ( 18 Oct 2021 11:42 )  PTT:43.0 sec  Urinalysis Basic - ( 18 Oct 2021 14:34 )    Color: Yellow / Appearance: Clear / S.010 / pH: x  Gluc: x / Ketone: Trace  / Bili: Negative / Urobili: Negative   Blood: x / Protein: 500 mg/dL / Nitrite: Negative   Leuk Esterase: Negative / RBC: 3-5 /HPF / WBC 0-2   Sq Epi: x / Non Sq Epi: Occasional / Bacteria: Occasional          CARDIAC MARKERS ( 19 Oct 2021 02:22 )  .084 ng/mL / x     / x     / x     / x      CARDIAC MARKERS ( 18 Oct 2021 20:09 )  .086 ng/mL / x     / 90 U/L / x     / 1.7 ng/mL  CARDIAC MARKERS ( 18 Oct 2021 11:42 )  .059 ng/mL / x     / x     / x     / x      CARDIAC MARKERS ( 14 Oct 2021 00:59 )  .055 ng/mL / x     / 46 U/L / x     / 1.3 ng/mL  CARDIAC MARKERS ( 13 Oct 2021 19:03 )  .064 ng/mL / x     / 51 U/L / x     / 1.6 ng/mL  CARDIAC MARKERS ( 13 Oct 2021 13:51 )  .063 ng/mL / x     / 51 U/L / x     / 1.2 ng/mL  CARDIAC MARKERS ( 13 Oct 2021 10:41 )  .053 ng/mL / x     / 46 U/L / x     / 1.2 ng/mL  CARDIAC MARKERS ( 13 Oct 2021 07:01 )  .035 ng/mL / x     / 51 U/L / x     / 1.2 ng/mL             Anemia Panel:      Thyroid Panel:            Serum Pro-Brain Natriuretic Peptide: 70788 pg/mL (10-18-21 @ 11:42)      RADIOLOGY & ADDITIONAL TESTS:      HEALTH ISSUES - PROBLEM Dx:  Acute exacerbation of CHF (congestive heart failure)    Hypertensive urgency    HLD (hyperlipidemia)    CAD (coronary artery disease)  s/p stents (not on plavix)    Type 2 diabetes mellitus  not on home insulin    Atrial fibrillation  first documented on EKG 10/7/2021    Depression with anxiety    Pneumonia    Elevated troponin    DVT prophylaxis    Stage 3 chronic kidney disease    Anemia            Consultant(s) Notes Reviewed:  [ x ] YES     Care Discussed with [X] Consultants  [ x ] Patient  [  ] Family [  ] HCP [  ]   [  ] Social Service  [  ] RN, [  ] Physical Therapy,[  ] Palliative care team  DVT PPX: [  ] Lovenox, [  ] S C Heparin, [  ] Coumadin, [  ] Xarelto, [ x ] Eliquis, [  ] Pradaxa, [  ] IV Heparin drip, [  ] SCD [  ] Contraindication 2 to GI Bleed,[  ] Ambulation [  ] Contraindicated 2 to  bleed [  ] Contraindicated 2 to Brain Bleed  Advanced directive: [ x ] None, [  ] DNR/DNI Patient is a 76y old  Male who presents with a chief complaint of shortness of breath (19 Oct 2021 09:10)    HPI:  77 yo M with PMHx of Type 2 DM (not on insulin), BPH, CAD s/p stents >4 years ago (not on plavix), diverticulitis (hospitalized 2020 at [facility]), GIB 2/2 diverticulosis (), anxiety, Lupus, HTN, HLD, CKD stage 3, HepC, 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 Patient states his SOB started on  and has been worse with laying down. Does admit to intermittent CP associated with the SOB and also when getting imaging as that makes him anxious. Does not appear to have a clear understanding of his medical conditions. Also, states he had some bleeding in his mouth on , but there is no longer bleeding; on exam there is a healing cut on his tongue and inner right cheek with no active bleeding likely from the pt biting his tongue. Admits difficulty laying down straight due to SOB. Denies fevers, chills, abd pain, n/v, sore throat, cough, palpitations  Of note, pt was recently admitted to [facility] on 10/7/21 for hypertensive urgency, Afib and  findings of PNA. was Rxed with IV Zosyn x 7 days  Abx .pt also got 1 u pRBC transfusion past week.    ED Course:   Vitals: BP: 210/96-->181/84, HR: 79, Temp: 98 F, RR: 17, SpO2: 100% on RA-->89% on RA-->98% on 2L NC   Labs: H/H: 9.6/30.6, aPTT: 43, PT/INR: 18.9/1.65, BUN/Cr: 28/2.2, alb: 3.1, GFR: 28, procal: .16, trop: .059, proBNP:10,846  Imaging:   CxR:  Bilateral lung parenchymal airspace opacities, increased from prior. Small pleural effusions suggested  B/l LE Dopplers: No evidence of deep venous thrombosis in either lower extremity.  Bilateral popliteal fossa fluid collections, likely Baker's cysts.  EKG: HR: 74, Atrial fibrillation, Incomplete right bundle branch block, Nonspecific T wave abnormality, Prolonged QT(461)  Received in the ED: Vanco and Zosyn x2, IV lasix 60mg, 10mg Hydralazine IVP x2 (18 Oct 2021 17:24)    INTERVAL HPI:  10/19/21: Patient was seen and examined at bedside. Denies any complaints. States his shortness of breath has improved.     OVERNIGHT EVENTS: no acute overnight events     Home Medications:  Lasix 40 mg oral tablet: 1 tab(s) orally once a day (18 Oct 2021 17:52)  Pepcid 20 mg oral tablet: 1 tab(s) orally every other day (18 Oct 2021 17:52)      MEDICATIONS  (STANDING):  amLODIPine   Tablet 10 milliGRAM(s) Oral daily  apixaban 5 milliGRAM(s) Oral every 12 hours  ARIPiprazole 30 milliGRAM(s) Oral daily  aspirin enteric coated 81 milliGRAM(s) Oral daily  atorvastatin 10 milliGRAM(s) Oral at bedtime  budesonide 160 MICROgram(s)/formoterol 4.5 MICROgram(s) Inhaler 2 Puff(s) Inhalation two times a day  cloNIDine 0.1 milliGRAM(s) Oral three times a day  cyanocobalamin 1000 MICROGram(s) Oral daily  doxazosin 8 milliGRAM(s) Oral at bedtime  famotidine    Tablet 20 milliGRAM(s) Oral daily  folic acid 1 milliGRAM(s) Oral daily  furosemide   Injectable 40 milliGRAM(s) IV Push daily  hydrALAZINE 100 milliGRAM(s) Oral three times a day  isosorbide   mononitrate ER Tablet (IMDUR) 30 milliGRAM(s) Oral daily  lamoTRIgine 100 milliGRAM(s) Oral daily  mirtazapine 30 milliGRAM(s) Oral at bedtime  NIFEdipine XL 60 milliGRAM(s) Oral daily  venlafaxine XR. 150 milliGRAM(s) Oral daily    MEDICATIONS  (PRN):      Allergies    No Known Allergies    Intolerances        Social History:  Tobacco: quit in , not active smoker  EtOH: denies   Recreational drug use: cocaine several years ago "a few times" has not used in "many years"  Lives with: 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 No nausea, No vomiting; No diarrhea, No constipation. [  ] BM  GENITOURINARY: No dysuria, No frequency, No urgency, No hematuria, NO incontinence  NEUROLOGICAL: No headaches, No dizziness, No numbness, No tingling, No tremors, No weakness  EXT: No Swelling, No Pain, No Edema  SKIN:  [ x ] No itching, burning, rashes, or lesions   MUSCULOSKELETAL: No joint pain ,No Jt swelling; No muscle pain, No back pain, No extremity pain  PSYCHIATRIC: No depression,  No anxiety,  No mood swings ,No difficulty sleeping at night  PAIN SCALE: [ x ] None  [  ] Other-  ROS Unable to obtain due to - [  ] Dementia  [  ] Lethargy [  ] Drowsy [  ] Sedated [  ] non verbal  REST OF REVIEW Of SYSTEM - [ x ] Normal     Vital Signs Last 24 Hrs  T(C): 36.7 (19 Oct 2021 05:10), Max: 36.9 (18 Oct 2021 21:45)  T(F): 98 (19 Oct 2021 05:10), Max: 98.4 (18 Oct 2021 21:45)  HR: 66 (19 Oct 2021 05:10) (62 - 82)  BP: 130/72 (19 Oct 2021 05:10) (130/72 - 209/96)  BP(mean): --  RR: 17 (19 Oct 2021 05:10) (17 - 20)  SpO2: 96% (19 Oct 2021 05:10) (96% - 100%)  Finger Stick          PHYSICAL EXAM: O 2  GENERAL:  [ x ] NAD , [ x ] well appearing, [  ] Agitated, [  ] Drowsy,  [  ] Lethargy, [  ] confused   HEAD:  [ x ] Normal, [  ] Other  EYES:  [ x ] EOMI, [  ] PERRLA, [ x ] conjunctiva and sclera clear normal, [  ] Other,  [  ] Pallor,[  ] Discharge  ENMT:  [ x ] Normal, [x  ] Moist mucous membranes, [ x ] Good dentition, [x  ] No Thrush  NECK:  [ x ] Supple, [ x ] No JVD, [x  ] Normal thyroid, [  ] Lymphadenopathy [  ] Other  CHEST/LUNG:  [ x ] Clear to auscultation bilaterally, [  ] Breath Sounds equal B/L [  ] poor effort  [ x ] No rales, [ x ] No rhonchi  [ x ]  No wheezing,   HEART:  [ x ] Regular rate and rhythm, [  ] tachycardia, [  ] Bradycardia,  [  ] irregular  [ x ] No murmurs, No rubs, No gallops, [  ] PPM in place (Mfr:  )  ABDOMEN:  [ x ] Soft, [ x Nontender, [x  ] Nondistended, [ x ] No mass, [ x ] Bowel sounds present, [ x ] obese  NERVOUS SYSTEM:  [ x ] Alert & Oriented X2, [x  ] Nonfocal  [  ] Confusion  [  ] Encephalopathic [  ] Sedated [  ] Unable to assess, [  ] Dementia [  ] Other-  EXTREMITIES: [ x ] 2+ Peripheral Pulses, No clubbing, No cyanosis,  [ x ]  2+ edema B/L lower EXT. [  ] PVD stasis skin changes B/L Lower EXT, [  ] wound  LYMPH: No lymphadenopathy noted  SKIN:  [  x] No rashes or lesions, [  ] Pressure Ulcers, [  ] ecchymosis, [  ] Skin Tears, [x  ] Other- Tattoos     DIET: Diet, Dysphagia 3 Soft-Thin Liquids:   Low Sodium (10-19-21 @ 09:33)      LABS:                        9.0    6.10  )-----------( 324      ( 19 Oct 2021 08:05 )             29.3     19 Oct 2021 08:05    142    |  110    |  34     ----------------------------<  136    3.4     |  25     |  2.60     Ca    8.9        19 Oct 2021 08:05  Phos  4.5       19 Oct 2021 08:05  Mg     2.3       19 Oct 2021 08:05    TPro  7.1    /  Alb  2.8    /  TBili  0.3    /  DBili  x      /  AST  16     /  ALT  22     /  AlkPhos  45     19 Oct 2021 08:05    PT/INR - ( 18 Oct 2021 11:42 )   PT: 18.9 sec;   INR: 1.65 ratio         PTT - ( 18 Oct 2021 11:42 )  PTT:43.0 sec  Urinalysis Basic - ( 18 Oct 2021 14:34 )    Color: Yellow / Appearance: Clear / S.010 / pH: x  Gluc: x / Ketone: Trace  / Bili: Negative / Urobili: Negative   Blood: x / Protein: 500 mg/dL / Nitrite: Negative   Leuk Esterase: Negative / RBC: 3-5 /HPF / WBC 0-2   Sq Epi: x / Non Sq Epi: Occasional / Bacteria: Occasional          CARDIAC MARKERS ( 19 Oct 2021 02:22 )  .084 ng/mL / x     / x     / x     / x      CARDIAC MARKERS ( 18 Oct 2021 20:09 )  .086 ng/mL / x     / 90 U/L / x     / 1.7 ng/mL  CARDIAC MARKERS ( 18 Oct 2021 11:42 )  .059 ng/mL / x     / x     / x     / x      CARDIAC MARKERS ( 14 Oct 2021 00:59 )  .055 ng/mL / x     / 46 U/L / x     / 1.3 ng/mL  CARDIAC MARKERS ( 13 Oct 2021 19:03 )  .064 ng/mL / x     / 51 U/L / x     / 1.6 ng/mL  CARDIAC MARKERS ( 13 Oct 2021 13:51 )  .063 ng/mL / x     / 51 U/L / x     / 1.2 ng/mL  CARDIAC MARKERS ( 13 Oct 2021 10:41 )  .053 ng/mL / x     / 46 U/L / x     / 1.2 ng/mL  CARDIAC MARKERS ( 13 Oct 2021 07:01 )  .035 ng/mL / x     / 51 U/L / x     / 1.2 ng/mL      Anemia Panel:      Thyroid Panel:      Serum Pro-Brain Natriuretic Peptide: 55619 pg/mL (10-18-21 @ 11:42)      RADIOLOGY & ADDITIONAL TESTS:      HEALTH ISSUES - PROBLEM Dx:  Acute exacerbation of CHF (congestive heart failure)    Hypertensive urgency    HLD (hyperlipidemia)    CAD (coronary artery disease)  s/p stents (not on plavix)    Type 2 diabetes mellitus  not on home insulin    Atrial fibrillation  first documented on EKG 10/7/2021    Depression with anxiety    Pneumonia    Elevated troponin    DVT prophylaxis    Stage 3 chronic kidney disease    Anemia      Consultant(s) Notes Reviewed:  [ x ] YES     Care Discussed with [X] Consultants  [ x ] Patient  [  ] Family [  ] HCP [  ]   [ x ] Social Service  [x  ] RN, [  ] Physical Therapy,[  ] Palliative care team  DVT PPX: [  ] Lovenox, [  ] S C Heparin, [  ] Coumadin, [  ] Xarelto, [ x ] Eliquis, [  ] Pradaxa, [  ] IV Heparin drip, [  ] SCD [  ] Contraindication 2 to GI Bleed,[  ] Ambulation [  ] Contraindicated 2 to  bleed [  ] Contraindicated 2 to Brain Bleed  Advanced directive: [ x ] None, [  ] DNR/DNI

## 2021-10-19 NOTE — DIETITIAN INITIAL EVALUATION ADULT. - PROBLEM SELECTOR PLAN 4
- troponin on admission 0.059-H/O CAD with Stents   - likely 2/2 demand ischemia; suspect now resolved intermitted CP 2/2 anxiety as it occurred when pt was having imaging done and pt admitted to feeling anxious at that time  - will trend troponin to peak  - EKG: HR: 74, Atrial fibrillation, Incomplete right bundle branch block, Nonspecific T wave abnormality, Prolonged QT(461) -- caution with Qtc prolonging agents   - cardio, Dr. Bliss[facility], following  -On EC ASA 81 mg daily  NO BB 2/2 Bradycardia

## 2021-10-19 NOTE — PROGRESS NOTE ADULT - SUBJECTIVE AND OBJECTIVE BOX
Patient is a 76y old  Male who presents with a chief complaint of shortness of breath (19 Oct 2021 09:10)  Patient seen in follow up for CKD 4, fluid overload.        PAST MEDICAL HISTORY:  Hypertension    Diabetes mellitus    Lupus    Hepatitis C    Anxiety and depression    CAD (coronary artery disease)    Diverticulosis    Hyperlipidemia    HTN (hypertension)    HLD (hyperlipidemia)    Atrial fibrillation    CAD (coronary artery disease)    Type 2 diabetes mellitus    Anxiety    History of diverticulitis    Diverticulosis    Afib    Stage 3 chronic kidney disease    Anemia of chronic disease    Chronic diastolic congestive heart failure    Multiple thyroid nodules      MEDICATIONS  (STANDING):  amLODIPine   Tablet 10 milliGRAM(s) Oral daily  apixaban 5 milliGRAM(s) Oral every 12 hours  ARIPiprazole 30 milliGRAM(s) Oral daily  aspirin enteric coated 81 milliGRAM(s) Oral daily  atorvastatin 10 milliGRAM(s) Oral at bedtime  budesonide 160 MICROgram(s)/formoterol 4.5 MICROgram(s) Inhaler 2 Puff(s) Inhalation two times a day  cloNIDine 0.1 milliGRAM(s) Oral three times a day  cyanocobalamin 1000 MICROGram(s) Oral daily  doxazosin 8 milliGRAM(s) Oral at bedtime  famotidine    Tablet 20 milliGRAM(s) Oral daily  folic acid 1 milliGRAM(s) Oral daily  furosemide   Injectable 40 milliGRAM(s) IV Push daily  hydrALAZINE 100 milliGRAM(s) Oral three times a day  isosorbide   mononitrate ER Tablet (IMDUR) 30 milliGRAM(s) Oral daily  lamoTRIgine 100 milliGRAM(s) Oral daily  mirtazapine 30 milliGRAM(s) Oral at bedtime  NIFEdipine XL 60 milliGRAM(s) Oral daily  venlafaxine XR. 150 milliGRAM(s) Oral daily    MEDICATIONS  (PRN):    T(C): 36.5 (10-19-21 @ 11:32), Max: 36.9 (10-18-21 @ 21:45)  HR: 69 (10-19-21 @ 11:32) (62 - 82)  BP: 134/70 (10-19-21 @ 11:32) (130/72 - 210/96)  RR: 18 (10-19-21 @ 11:32) (17 - 20)  SpO2: 95% (10-19-21 @ 11:32) (89% - 100%)  Wt(kg): --  I&O's Detail    19 Oct 2021 07:01  -  19 Oct 2021 11:37  --------------------------------------------------------  IN:  Total IN: 0 mL    OUT:    Voided (mL): 200 mL  Total OUT: 200 mL    Total NET: -200 mL          PHYSICAL EXAM:  General: + sob  Respiratory: b/l air entry  Cardiovascular: S1 S2  Gastrointestinal: soft  Extremities: + edema                              9.0    6.10  )-----------( 324      ( 19 Oct 2021 08:05 )             29.3     10    142  |  110<H>  |  34<H>  ----------------------------<  136<H>  3.4<L>   |  25  |  2.60<H>    Ca    8.9      19 Oct 2021 08:05  Phos  4.5     10  Mg     2.3     10-19    TPro  7.1  /  Alb  2.8<L>  /  TBili  0.3  /  DBili  x   /  AST  16  /  ALT  22  /  AlkPhos  45  10    CARDIAC MARKERS ( 19 Oct 2021 02:22 )  .084 ng/mL / x     / x     / x     / x      CARDIAC MARKERS ( 18 Oct 2021 20:09 )  .086 ng/mL / x     / 90 U/L / x     / 1.7 ng/mL  CARDIAC MARKERS ( 18 Oct 2021 11:42 )  .059 ng/mL / x     / x     / x     / x          LIVER FUNCTIONS - ( 19 Oct 2021 08:05 )  Alb: 2.8 g/dL / Pro: 7.1 g/dL / ALK PHOS: 45 U/L / ALT: 22 U/L / AST: 16 U/L / GGT: x           Urinalysis Basic - ( 18 Oct 2021 14:34 )    Color: Yellow / Appearance: Clear / S.010 / pH: x  Gluc: x / Ketone: Trace  / Bili: Negative / Urobili: Negative   Blood: x / Protein: 500 mg/dL / Nitrite: Negative   Leuk Esterase: Negative / RBC: 3-5 /HPF / WBC 0-2   Sq Epi: x / Non Sq Epi: Occasional / Bacteria: Occasional        Sodium, Serum: 142 (10-19 @ 08:05)  Sodium, Serum: 142 (10-18 @ 11:42)  Sodium, Serum: 140 (10-16 @ 09:16)    Creatinine, Serum: 2.60 (10-19 @ 08:05)  Creatinine, Serum: 2.20 (10-18 @ 11:42)  Creatinine, Serum: 2.50 (10-16 @ 09:16)    Potassium, Serum: 3.4 (10-19 @ 08:05)  Potassium, Serum: 3.7 (10-18 @ 11:42)  Potassium, Serum: 3.9 (10-16 @ 09:16)    Hemoglobin: 9.0 (10-19 @ 08:05)  Hemoglobin: 9.6 (10-18 @ 11:42)  Hemoglobin: 8.5 (10-16 @ 08:52)  Hemoglobin: 7.9 (10-15 @ 14:44)

## 2021-10-19 NOTE — DIETITIAN INITIAL EVALUATION ADULT. - OTHER INFO
77 y/o male adm with acute respiratory distress/pulmonary infiltrates, pleural effusion, RI, hypoxia. PMH HF, anemia of chronic disease, stage 3 CKD, afib, diverticulosis, anxiety, Type 2 DM, CAD, afib, HLD, HTN. Pt adm from Group Home. Pt visited at bedside this am. Pt states that his appetite is very good, tolerating meals well, no complaints. Pt does not check his blood glucose levels at home. No wt change, as per pt.  Verbally reviewed diet order with pt. Pt verbalized understanding. Compliance after d/c is questionable. Last A1c from 10/8/21 noted to be 6.3. Will rx adding consistent carb to diet order. Pt familiar to RD from last adm this month. At which time verbal and written diet education was provided. SLP ken 10/19 rx dysphagia 3 soft diet with thin liquids.

## 2021-10-19 NOTE — PROGRESS NOTE ADULT - PROBLEM SELECTOR PLAN 3
- Pt was recent admitted to \[facility]"" from 10/7-10/16 and treated for PNA with IV Zosyn & Augmentin x 7 days   - Although CxR shows bilateral lung parenchymal airspace opacities, increased from prior pt does not have leukocytosis or fever and it is suspected SOB is more so 2/2 CHF exacerbation. CxR findings are suspected to be from previously treated PNA  - s/p vanc and zosyn in ED. will hold off on further Abx at this time.as per Pulmonary Dr Day   - F/u Cultures: pending results   - official S&S: dysphagis 3, soft solids   - Pulm, Dr. Day, following  Continue Symbicort 2x day. - Pt was recent admitted to [facility] from 10/7-10/16 and treated for PNA with IV Zosyn & Augmentin x 7 days   - Although CxR shows bilateral lung parenchymal airspace opacities, increased from prior pt does not have leukocytosis or fever and it is suspected SOB is more so 2/2 CHF exacerbation. CxR findings are suspected to be from previously treated PNA  - s/p vanc and zosyn in ED. will hold off on further Abx at this time.as per Pulmonary Dr Day   - F/u Cultures: pending results, Nl Lactate   - official S&S: dysphagis 3, soft solids   - Pulm, Dr. Day,- Keep Off Abx , glorialey Old PNA   Continue Symbicort 2x day.

## 2021-10-19 NOTE — PROGRESS NOTE ADULT - PROBLEM SELECTOR PLAN 8
- History of T2DM -- diet controlled   - HbA1C- 6.3 on last admission (10/8)  - started on carb consistent diet  - can start ISS if BG readings are elevated; BG was controlled on previous admission.

## 2021-10-19 NOTE — PROGRESS NOTE ADULT - ATTENDING COMMENTS
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 Pt was already Rxed for PNA with IV Zosyn last week,  Admitted for ac on Chronic Diastolic  CHF exacerbation and HTN urgency.  Pt seen, Examined, case & care plan d/w pt, residents at detail.  -Concern for NON Compliance with meds /supervision at group home.  Consults -  Pulmonary-Dr Day D/W   Cardio-DR Posey following -IV Lasix   Psych-DR Winkler d/w about Capacity   Plan for CT Chest -NOn cont -Fluids/PNA   AM Labs  PT Eval.  Social service Eval.

## 2021-10-19 NOTE — PHYSICAL THERAPY INITIAL EVALUATION ADULT - PERTINENT HX OF CURRENT PROBLEM, REHAB EVAL
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 10/18 for SOB. Pt had a recent admis. here at [facility] for hypertensive urgency

## 2021-10-19 NOTE — PROGRESS NOTE ADULT - ASSESSMENT
CKD 4  Diabetes  Dyspnea, Fluid overload  Hypertension  h/o SLE  Hypokalemia  Anemia    Worsening creatinine trend with diuresis. Retacrit for anemia. Increase IV lasix to bid. Monitor blood sugar levels. Insulin coverage as needed. Monitor h/h trend.   Potassium supplementation. Monitor BP trend. Titrate BP meds as needed. Salt restriction. Will follow electrolytes and renal function trend. Avoid nephrotoxic meds as possible.

## 2021-10-19 NOTE — DIETITIAN INITIAL EVALUATION ADULT. - PROBLEM SELECTOR PLAN 1
- Pt presented with SOB worse with laying down and with LE edema Since Sunday  - CxR: Bilateral lung parenchymal airspace opacities, increased from prior. Small pleural effusions suggested  - B/l LE Dopplers: No evidence of deep venous thrombosis in either lower extremity.  - BNP: 29998  - TTE on 10/13/21(previous admission): LVEF 55-60%. LV diastolic function cannot determine due to atrial fibrillation. -- Do not need to repeat at time AC on Chronic Diastolic CHF   - supplemental O2 as needed   - s/p 60mg IVP  - Will c/w 40mg IVP lasix daily, I/O,Weight   - F/u am CT Chest as pt refusing at this time  - Monitor fluid status closely  - Cardio, Dr. Bliss's group, following

## 2021-10-19 NOTE — DIETITIAN INITIAL EVALUATION ADULT. - PROBLEM SELECTOR PLAN 7
- Hgb on admission 9.6 (per chart review baseline appears to be 9-10) 2/2 CKD -3  - hemodynamically stable, Pt was seen by hematology -Dr Rodriguez Anemia work up done   -pt Got 1 u pRBC 10/15/21   - No active signs or symptoms of bleeding at this time; healing mouth wounds  - On previous admission heme workup showed serum immunofixation with weak IgG-kappa, normal IgG/A/M, both kappa and lambda elevated and k/l ratio sl elevated. Findings more c/w MGUS or reactive, can be monitored serially as outpatient.-MGUS   - c/w folate and B12  - Out pt follow up with heme  - trend CBC

## 2021-10-19 NOTE — PROGRESS NOTE ADULT - PROBLEM SELECTOR PLAN 6
- CKD 3-4- Cr stable   - baseline Cr appears to be 1.6 - 2.3  - Cr on admission 2.2; improved from 2.5 on 10/16  - Avoid nephrotoxic medications  - Trend BMP  - NephroDr. Rodarte consulted by ED.

## 2021-10-19 NOTE — SWALLOW BEDSIDE ASSESSMENT ADULT - ASR SWALLOW RECOMMEND DIAG
Consider a Modified Barium Swallow Study to objectively assess swallow function to r/o silent aspiration given CT of the chest results./VFSS/MBS

## 2021-10-19 NOTE — DIETITIAN INITIAL EVALUATION ADULT. - PERSON TAUGHT/METHOD
dysphagia 3 soft/thin liquids; low sodium/verbal instruction/patient instructed/teach back - (Patient repeats in own words)

## 2021-10-19 NOTE — SWALLOW BEDSIDE ASSESSMENT ADULT - SWALLOW EVAL: DIAGNOSIS
1. The patient demonstrated functional oral management of puree and thin liquid textures marked by adequate bolus collection, transfer, and posterior transport. 2. The patient demonstrated a mild oral dysphagia for solids marked by adequate oral acceptance with prolonged oral manipulation resulting in delayed bolus collection, transfer, and posterior transport. Mild lingual residue noted subsequent to deglutition which cleared with utilization of a liquid wash. 3. The patient demonstrated a judged functional pharyngeal phase of the swallow for puree, solid, and thin liquid textures marked by a suspected timely pharyngeal swallow trigger with hyolaryngeal elevation noted upon digital palpation w/o evidence of airway penetration.

## 2021-10-19 NOTE — PROGRESS NOTE ADULT - PROBLEM SELECTOR PLAN 7
- Hgb on admission 9.6 (per chart review baseline appears to be 9-10) 2/2 CKD -3  - hemodynamically stable, Pt was seen by hematology -Dr Rodriguez Anemia work up done   -pt Got 1 u pRBC 10/15/21   - No active signs or symptoms of bleeding at this time; healing mouth wounds  - On previous admission heme workup showed serum immunofixation with weak IgG-kappa, normal IgG/A/M, both kappa and lambda elevated and k/l ratio sl elevated. Findings more c/w MGUS or reactive, can be monitored serially as outpatient.-MGUS   - c/w folate and B12  - Out pt follow up with heme  - trend CBC.

## 2021-10-19 NOTE — PROGRESS NOTE ADULT - PROBLEM SELECTOR PLAN 1
- Pt presented with SOB worse with laying down and with LE edema Since Sunday  - CxR: Bilateral lung parenchymal airspace opacities, increased from prior. Small pleural effusions suggested  - B/l LE Dopplers: No evidence of deep venous thrombosis in either lower extremity.  - BNP: 71258  - TTE on 10/13/21(previous admission): LVEF 55-60%. LV diastolic function cannot determine due to atrial fibrillation. -- Do not need to repeat at time AC on Chronic Diastolic CHF   - supplemental O2 as needed   - s/p 60mg IVP  - Will c/w 40mg IVP lasix daily, I/O,Weight   - am CT chest:   1. Small bilateral pleural effusions.  2. Multifocal bilateral pneumonia, organism nonspecific, does not have the characteristic appearance of COVID-19 but is within range of what can be seen with COVID-19 pneumonia.  - Monitor fluid status closely  - Cardio, Dr. Bliss's group, following.  - Pulm following- recommends monitoring off antibiotics at this time

## 2021-10-19 NOTE — PROGRESS NOTE ADULT - ATTENDING COMMENTS
remains with a degree of vol, cont iv lasix  hypertensive urgency, better controlled now, cont his mult meds  no acute ischemia, cont asa and statin

## 2021-10-20 LAB
ALBUMIN SERPL ELPH-MCNC: 2.4 G/DL — LOW (ref 3.3–5)
ALP SERPL-CCNC: 42 U/L — SIGNIFICANT CHANGE UP (ref 40–120)
ALT FLD-CCNC: 19 U/L — SIGNIFICANT CHANGE UP (ref 12–78)
ANION GAP SERPL CALC-SCNC: 7 MMOL/L — SIGNIFICANT CHANGE UP (ref 5–17)
AST SERPL-CCNC: 13 U/L — LOW (ref 15–37)
BASOPHILS # BLD AUTO: 0.03 K/UL — SIGNIFICANT CHANGE UP (ref 0–0.2)
BASOPHILS NFR BLD AUTO: 0.4 % — SIGNIFICANT CHANGE UP (ref 0–2)
BILIRUB SERPL-MCNC: 0.3 MG/DL — SIGNIFICANT CHANGE UP (ref 0.2–1.2)
BUN SERPL-MCNC: 42 MG/DL — HIGH (ref 7–23)
CALCIUM SERPL-MCNC: 8.1 MG/DL — LOW (ref 8.5–10.1)
CHLORIDE SERPL-SCNC: 109 MMOL/L — HIGH (ref 96–108)
CO2 SERPL-SCNC: 26 MMOL/L — SIGNIFICANT CHANGE UP (ref 22–31)
CREAT SERPL-MCNC: 2.8 MG/DL — HIGH (ref 0.5–1.3)
EOSINOPHIL # BLD AUTO: 0.24 K/UL — SIGNIFICANT CHANGE UP (ref 0–0.5)
EOSINOPHIL NFR BLD AUTO: 3.3 % — SIGNIFICANT CHANGE UP (ref 0–6)
GLUCOSE SERPL-MCNC: 110 MG/DL — HIGH (ref 70–99)
HCT VFR BLD CALC: 26.4 % — LOW (ref 39–50)
HGB BLD-MCNC: 8 G/DL — LOW (ref 13–17)
IMM GRANULOCYTES NFR BLD AUTO: 0.4 % — SIGNIFICANT CHANGE UP (ref 0–1.5)
LYMPHOCYTES # BLD AUTO: 1.24 K/UL — SIGNIFICANT CHANGE UP (ref 1–3.3)
LYMPHOCYTES # BLD AUTO: 16.8 % — SIGNIFICANT CHANGE UP (ref 13–44)
MCHC RBC-ENTMCNC: 26 PG — LOW (ref 27–34)
MCHC RBC-ENTMCNC: 30.3 GM/DL — LOW (ref 32–36)
MCV RBC AUTO: 85.7 FL — SIGNIFICANT CHANGE UP (ref 80–100)
MONOCYTES # BLD AUTO: 0.57 K/UL — SIGNIFICANT CHANGE UP (ref 0–0.9)
MONOCYTES NFR BLD AUTO: 7.7 % — SIGNIFICANT CHANGE UP (ref 2–14)
NEUTROPHILS # BLD AUTO: 5.27 K/UL — SIGNIFICANT CHANGE UP (ref 1.8–7.4)
NEUTROPHILS NFR BLD AUTO: 71.4 % — SIGNIFICANT CHANGE UP (ref 43–77)
NRBC # BLD: 0 /100 WBCS — SIGNIFICANT CHANGE UP (ref 0–0)
PLATELET # BLD AUTO: 295 K/UL — SIGNIFICANT CHANGE UP (ref 150–400)
POTASSIUM SERPL-MCNC: 3.1 MMOL/L — LOW (ref 3.5–5.3)
POTASSIUM SERPL-SCNC: 3.1 MMOL/L — LOW (ref 3.5–5.3)
PROT SERPL-MCNC: 6.1 G/DL — SIGNIFICANT CHANGE UP (ref 6–8.3)
RBC # BLD: 3.08 M/UL — LOW (ref 4.2–5.8)
RBC # FLD: 17.2 % — HIGH (ref 10.3–14.5)
SODIUM SERPL-SCNC: 142 MMOL/L — SIGNIFICANT CHANGE UP (ref 135–145)
URATE SERPL-MCNC: 7.2 MG/DL — SIGNIFICANT CHANGE UP (ref 3.4–8.8)
WBC # BLD: 7.38 K/UL — SIGNIFICANT CHANGE UP (ref 3.8–10.5)
WBC # FLD AUTO: 7.38 K/UL — SIGNIFICANT CHANGE UP (ref 3.8–10.5)

## 2021-10-20 PROCEDURE — 74230 X-RAY XM SWLNG FUNCJ C+: CPT | Mod: 26

## 2021-10-20 PROCEDURE — 99221 1ST HOSP IP/OBS SF/LOW 40: CPT

## 2021-10-20 PROCEDURE — 99233 SBSQ HOSP IP/OBS HIGH 50: CPT

## 2021-10-20 RX ORDER — POTASSIUM CHLORIDE 20 MEQ
40 PACKET (EA) ORAL EVERY 4 HOURS
Refills: 0 | Status: COMPLETED | OUTPATIENT
Start: 2021-10-20 | End: 2021-10-20

## 2021-10-20 RX ADMIN — Medication 0.1 MILLIGRAM(S): at 21:28

## 2021-10-20 RX ADMIN — Medication 40 MILLIEQUIVALENT(S): at 10:50

## 2021-10-20 RX ADMIN — Medication 40 MILLIEQUIVALENT(S): at 13:50

## 2021-10-20 RX ADMIN — APIXABAN 5 MILLIGRAM(S): 2.5 TABLET, FILM COATED ORAL at 17:44

## 2021-10-20 RX ADMIN — BUDESONIDE AND FORMOTEROL FUMARATE DIHYDRATE 2 PUFF(S): 160; 4.5 AEROSOL RESPIRATORY (INHALATION) at 17:45

## 2021-10-20 RX ADMIN — ARIPIPRAZOLE 30 MILLIGRAM(S): 15 TABLET ORAL at 12:29

## 2021-10-20 RX ADMIN — BUDESONIDE AND FORMOTEROL FUMARATE DIHYDRATE 2 PUFF(S): 160; 4.5 AEROSOL RESPIRATORY (INHALATION) at 05:06

## 2021-10-20 RX ADMIN — FAMOTIDINE 20 MILLIGRAM(S): 10 INJECTION INTRAVENOUS at 12:30

## 2021-10-20 RX ADMIN — ISOSORBIDE MONONITRATE 30 MILLIGRAM(S): 60 TABLET, EXTENDED RELEASE ORAL at 12:31

## 2021-10-20 RX ADMIN — Medication 100 MILLIGRAM(S): at 13:50

## 2021-10-20 RX ADMIN — Medication 150 MILLIGRAM(S): at 12:29

## 2021-10-20 RX ADMIN — PREGABALIN 1000 MICROGRAM(S): 225 CAPSULE ORAL at 12:29

## 2021-10-20 RX ADMIN — Medication 8 MILLIGRAM(S): at 21:28

## 2021-10-20 RX ADMIN — Medication 60 MILLIGRAM(S): at 05:05

## 2021-10-20 RX ADMIN — LAMOTRIGINE 100 MILLIGRAM(S): 25 TABLET, ORALLY DISINTEGRATING ORAL at 12:30

## 2021-10-20 RX ADMIN — Medication 0.1 MILLIGRAM(S): at 05:05

## 2021-10-20 RX ADMIN — Medication 81 MILLIGRAM(S): at 12:29

## 2021-10-20 RX ADMIN — Medication 40 MILLIGRAM(S): at 17:44

## 2021-10-20 RX ADMIN — MIRTAZAPINE 30 MILLIGRAM(S): 45 TABLET, ORALLY DISINTEGRATING ORAL at 21:28

## 2021-10-20 RX ADMIN — APIXABAN 5 MILLIGRAM(S): 2.5 TABLET, FILM COATED ORAL at 05:05

## 2021-10-20 RX ADMIN — Medication 0.1 MILLIGRAM(S): at 13:50

## 2021-10-20 RX ADMIN — ATORVASTATIN CALCIUM 10 MILLIGRAM(S): 80 TABLET, FILM COATED ORAL at 21:28

## 2021-10-20 RX ADMIN — Medication 100 MILLIGRAM(S): at 05:05

## 2021-10-20 RX ADMIN — Medication 40 MILLIGRAM(S): at 05:06

## 2021-10-20 RX ADMIN — Medication 100 MILLIGRAM(S): at 21:28

## 2021-10-20 RX ADMIN — AMLODIPINE BESYLATE 10 MILLIGRAM(S): 2.5 TABLET ORAL at 05:05

## 2021-10-20 RX ADMIN — Medication 1 MILLIGRAM(S): at 12:30

## 2021-10-20 NOTE — SWALLOW VFSS/MBS ASSESSMENT ADULT - ORAL PHASE
within functional limits Delayed oral transit time/Reduced anterior - posterior transport/Residue in oral cavity Delayed oral transit time/Reduced anterior - posterior transport/Residue in oral cavity/Uncontrolled bolus / spillover in hypopharynx

## 2021-10-20 NOTE — SWALLOW VFSS/MBS ASSESSMENT ADULT - RECOMMENDED CONSISTENCY
1. Dysphagia 3 (soft solids) and thin liquids, via small cup sips, as tolerated  2. Thin liquids via small, single cups sip only    3. Slow pace, single/small bites, alternate solids with liquids  4. Maintain upright position  5. Maintain aspiration precautions  6. Maintain strict oral care  7. Swallowing therapy recommended upon d/c to maximize swallow function DIAGNOSTIC IMPRESSIONS CONTINUED: Compensatory strategies (i.e. single, small cup sips and chin tuck) were successful in airway protection.     RECOMMENDATIONS:  1. Dysphagia 3 (soft solids) and thin liquids, via small cup sips, as tolerated  2. Thin liquids via small, single cups sip only    3. Slow pace, single/small bites, alternate solids with liquids  4. Maintain upright position  5. Maintain aspiration precautions  6. Maintain strict oral care  7. Swallowing therapy recommended upon d/c to maximize swallow function DIAGNOSTIC IMPRESSIONS CONTINUED: Compensatory strategies (i.e. single, small cup sips and chin tuck) were successful in airway protection.     RECOMMENDATIONS:  1. Dysphagia 3 (soft solids) and thin liquids, as tolerated  2. Thin liquids via single, small cup sips only    3. Slow pace, single/small bites/sips, alternate solids with liquids  4. Maintain upright position  5. Maintain aspiration precautions  6. Maintain strict oral care  7. Swallowing therapy recommended upon discharge to maximize swallow function

## 2021-10-20 NOTE — SWALLOW VFSS/MBS ASSESSMENT ADULT - DIAGNOSTIC IMPRESSIONS
1. Patient demonstrates a functional oral phase for puree and honey thick liquids marked by adequate oral acceptance and timely collection, transport, and AP transit time.   2. Patient demonstrates a mild oral dysphagia for solids marked by adequate oral acceptance, increased mastication time resulting in delayed collection, transport, and AP transit time. Trace lingual and palatal stasis visualized post swallow, which reduced with liquid wash.  3. Patient demonstrates a mild oral dysphagia for nectar thick and thin liquids marked by adequate oral acceptance, delayed collection, transport, and AP transit time with spillover into the hypopharynx visualized. Trace lingual and palatal stasis visualized post swallow, which reduced with subsequent swallow.  4. Patient demonstrates a mild pharyngeal dysphagia for puree, solids, and honey thick liquids marked by timely pharyngeal swallow trigger, reduced tongue base retraction, adequate epiglottic deflection, reduced hyolaryngeal elevation, and reduced pharyngeal contractility resulting in trace stasis at the BOT, vallecula, posterior pharyngeal wall, and pyriform sinuses, which reduced with subsequent swallow. No laryngeal penetration/aspiration visualized with puree, solids, and honey thick liquids.   5. Patient demonstrates a mild pharyngeal dysphagia for nectar thick and thin liquids marked by delayed pharyngeal swallow trigger (with bolus head at the level of the pyriforms with thin liquids and nectar thick liquids x1), reduced tongue base retraction, reduced epiglottic deflection, reduced hyolaryngeal elevation, and reduced pharyngeal contractility resulting in trace stasis at the BOT, vallecula, posterior pharyngeal wall, and pyriform sinuses, which reduced with subsequent swallow. Incomplete closure of the laryngeal vestibule visualized with thin liquids and nectar thick liquids x1 resulting in laryngeal penetration during the swallow with full retrieval.

## 2021-10-20 NOTE — BH CONSULTATION LIAISON ASSESSMENT NOTE - NSBHCHARTREVIEWVS_PSY_A_CORE FT
Vital Signs Last 24 Hrs  T(C): 36.9 (20 Oct 2021 11:37), Max: 36.9 (20 Oct 2021 11:37)  T(F): 98.5 (20 Oct 2021 11:37), Max: 98.5 (20 Oct 2021 11:37)  HR: 67 (20 Oct 2021 11:37) (67 - 69)  BP: 132/67 (20 Oct 2021 11:37) (132/67 - 162/76)  BP(mean): --  RR: 18 (20 Oct 2021 11:37) (17 - 18)  SpO2: 92% (20 Oct 2021 11:37) (92% - 99%)

## 2021-10-20 NOTE — SWALLOW VFSS/MBS ASSESSMENT ADULT - RECOMMENDED FEEDING/EATING TECHNIQUES
* small, single cup sips only/allow for swallow between intakes/alternate food with liquid/check mouth frequently for oral residue/pocketing/no straws/oral hygiene/position upright (90 degrees)/small sips/bites *single, small cup sips only/allow for swallow between intakes/alternate food with liquid/check mouth frequently for oral residue/pocketing/no straws/oral hygiene/position upright (90 degrees)/small sips/bites

## 2021-10-20 NOTE — PROGRESS NOTE ADULT - PROBLEM SELECTOR PLAN 5
- chronic- tele  - EKG: HR: 74, Atrial fibrillation, Incomplete right bundle branch block, Nonspecific T wave abnormality, Prolonged QT(461)  - off digoxin and labetalol since last admission 2/2 Bradycardia & Pauses -- remains rate controlled  - AC with Eliquis 5mg BID   - Cardiology, Dr. Bliss's, group following.

## 2021-10-20 NOTE — PROGRESS NOTE ADULT - SUBJECTIVE AND OBJECTIVE BOX
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 Patient states his SOB started on  and has been worse with laying down. Does admit to intermittent CP associated with the SOB and also when getting imaging as that makes him anxious. Does not appear to have a clear understanding of his medical conditions. Also, states he had some bleeding in his mouth on , but there is no longer bleeding; on exam there is a healing cut on his tongue and inner right cheek with no active bleeding likely from the pt biting his tongue. Admits difficulty laying down straight due to SOB. Denies fevers, chills, abd pain, n/v, sore throat, cough, palpitations  Of note, pt was recently admitted to [facility] on 10/7/21 for hypertensive urgency, Afib and  findings of PNA. was Rxed with IV Zosyn x 7 days  Abx .pt also got 1 u pRBC transfusion past week.    ED Course:   Vitals: BP: 210/96-->181/84, HR: 79, Temp: 98 F, RR: 17, SpO2: 100% on RA-->89% on RA-->98% on 2L NC   Labs: H/H: 9.6/30.6, aPTT: 43, PT/INR: 18.9/1.65, BUN/Cr: 28/2.2, alb: 3.1, GFR: 28, procal: .16, trop: .059, proBNP:10,846  Imaging:   CxR:  Bilateral lung parenchymal airspace opacities, increased from prior. Small pleural effusions suggested  B/l LE Dopplers: No evidence of deep venous thrombosis in either lower extremity.  Bilateral popliteal fossa fluid collections, likely Baker's cysts.  EKG: HR: 74, Atrial fibrillation, Incomplete right bundle branch block, Nonspecific T wave abnormality, Prolonged QT(461)  Received in the ED: Vanco and Zosyn x2, IV lasix 60mg, 10mg Hydralazine IVP x2 (18 Oct 2021 17:24)    INTERVAL HPI:  10/19/21: Patient was seen and examined at bedside. Denies any complaints. States his shortness of breath has improved.   10/20/21: Patient was seen and examined at bedside. States he was not able to sleep well because of another patient screaming across the room. Otherwise denies worsening SOB, chest pain, palpitations.     OVERNIGHT EVENTS: No acute events overnight     Home Medications:  Lasix 40 mg oral tablet: 1 tab(s) orally once a day (18 Oct 2021 17:52)  Pepcid 20 mg oral tablet: 1 tab(s) orally every other day (18 Oct 2021 17:52)      MEDICATIONS  (STANDING):  amLODIPine   Tablet 10 milliGRAM(s) Oral daily  apixaban 5 milliGRAM(s) Oral every 12 hours  ARIPiprazole 30 milliGRAM(s) Oral daily  aspirin enteric coated 81 milliGRAM(s) Oral daily  atorvastatin 10 milliGRAM(s) Oral at bedtime  budesonide 160 MICROgram(s)/formoterol 4.5 MICROgram(s) Inhaler 2 Puff(s) Inhalation two times a day  cloNIDine 0.1 milliGRAM(s) Oral three times a day  cyanocobalamin 1000 MICROGram(s) Oral daily  doxazosin 8 milliGRAM(s) Oral at bedtime  famotidine    Tablet 20 milliGRAM(s) Oral daily  folic acid 1 milliGRAM(s) Oral daily  furosemide   Injectable 40 milliGRAM(s) IV Push every 12 hours  hydrALAZINE 100 milliGRAM(s) Oral three times a day  isosorbide   mononitrate ER Tablet (IMDUR) 30 milliGRAM(s) Oral daily  lamoTRIgine 100 milliGRAM(s) Oral daily  mirtazapine 30 milliGRAM(s) Oral at bedtime  NIFEdipine XL 60 milliGRAM(s) Oral daily  potassium chloride    Tablet ER 40 milliEquivalent(s) Oral every 4 hours  venlafaxine XR. 150 milliGRAM(s) Oral daily    MEDICATIONS  (PRN):      Allergies    No Known Allergies    Intolerances        Social History:  Tobacco: quit in , not active smoker  EtOH: denies   Recreational drug use: cocaine several years ago "a few times" has not used in "many years"  Lives with: [facility]; group home; no nursing assistance; observation is there  Ambulates: independent, denies walker or cane but uses guard railings  ADLs: independent, but states need for assistance in bathing, cooking; independent with feeding, ambulating  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[place]  Vaccinations: Moderna x2 doses last dose in May (18 Oct 2021 17:24)      REVIEW OF SYSTEMS:  CONSTITUTIONAL: No fever, No chills, No fatigue, No myalgia, No Body ache, No Weakness  EYES: No eye pain,  No visual disturbances, No discharge, NO Redness  ENMT:  No ear pain, No nose bleed, No vertigo; No sinus pain, NO throat pain, No Congestion  NECK: No pain, No stiffness  RESPIRATORY: No cough, NO wheezing, No  hemoptysis, NO  shortness of breath  CARDIOVASCULAR: No chest pain, palpitations  GASTROINTESTINAL: No abdominal pain, NO epigastric pain. No nausea, No vomiting; No diarrhea, No constipation. [  ] BM  GENITOURINARY: No dysuria, No frequency, No urgency, No hematuria, NO incontinence  NEUROLOGICAL: No headaches, No dizziness, No numbness, No tingling, No tremors, No weakness  EXT: No Swelling, No Pain, No Edema  SKIN:  [ x ] No itching, burning, rashes, or lesions   MUSCULOSKELETAL: No joint pain ,No Jt swelling; No muscle pain, No back pain, No extremity pain  PSYCHIATRIC: No depression,  No anxiety,  No mood swings ,No difficulty sleeping at night  PAIN SCALE: [ x ] None  [  ] Other-  ROS Unable to obtain due to - [  ] Dementia  [  ] Lethargy [  ] Drowsy [  ] Sedated [  ] non verbal  REST OF REVIEW Of SYSTEM - [ x ] Normal     Vital Signs Last 24 Hrs  T(C): 36.7 (20 Oct 2021 05:05), Max: 36.7 (20 Oct 2021 05:05)  T(F): 98.1 (20 Oct 2021 05:05), Max: 98.1 (20 Oct 2021 05:05)  HR: 69 (20 Oct 2021 05:05) (68 - 69)  BP: 149/69 (20 Oct 2021 05:05) (134/70 - 162/76)  BP(mean): --  RR: 17 (20 Oct 2021 05:05) (17 - 18)  SpO2: 95% (20 Oct 2021 05:05) (95% - 99%)  Finger Stick        10- @ 07:01  -  10-20 @ 07:00  --------------------------------------------------------  IN: 0 mL / OUT: 850 mL / NET: -850 mL    10-20 @ 07:01  -  10-20 @ 10:23  --------------------------------------------------------  IN: 0 mL / OUT: 400 mL / NET: -400 mL        PHYSICAL EXAM:  GENERAL:  [x  ] NAD , [ x ] well appearing, [  ] Agitated, [  ] Drowsy,  [  ] Lethargy, [  ] confused   HEAD:  [ x ] Normal, [  ] Other  EYES:  [  x] EOMI, [  ] PERRLA, [x  ] conjunctiva and sclera clear normal, [  ] Other,  [  ] Pallor,[  ] Discharge  ENMT:  [ x ] Normal, [  ] Moist mucous membranes, [  ] Good dentition, [  ] No Thrush  NECK:  [ x ] Supple, [ x ] No JVD, [  ] Normal thyroid, [  ] Lymphadenopathy [  ] Other  CHEST/LUNG:  [ x ] Clear to auscultation bilaterally, [ x ] Breath Sounds equal B/L  [  ] poor effort  [ x ] No rales, [ x ] No rhonchi  [ x ]  No wheezing,   HEART:  [ x ] Regular rate and rhythm, [  ] tachycardia, [  ] Bradycardia,  [  ] irregular  [ x ] No murmurs, No rubs, No gallops, [  ] PPM in place (Mfr:  )  ABDOMEN:  [ x ] Soft, [ x ] Nontender, [ x ] Nondistended, [  ] No mass, [  ] Bowel sounds present, [  ] obese  NERVOUS SYSTEM:  [ x ] Alert & Oriented X3, [  ] Nonfocal  [  ] Confusion  [  ] Encephalopathic [  ] Sedated [  ] Unable to assess, [  ] Dementia [  ] Other-  EXTREMITIES: [ x ] 2+ Peripheral Pulses, No clubbing, No cyanosis,  [  ] edema B/L lower EXT. [  ] PVD stasis skin changes B/L Lower EXT, [  ] wound  LYMPH: No lymphadenopathy noted  SKIN:  [ x ] No rashes or lesions, [  ] Pressure Ulcers, [  ] ecchymosis, [  ] Skin Tears, [  ] Other    DIET: Diet, Dysphagia 3 Soft-Thin Liquids:   Low Sodium (10-19-21 @ 09:33)      LABS:                        8.0    7.38  )-----------( 295      ( 20 Oct 2021 08:47 )             26.4     20 Oct 2021 08:47    142    |  109    |  42     ----------------------------<  110    3.1     |  26     |  2.80     Ca    8.1        20 Oct 2021 08:47    TPro  6.1    /  Alb  2.4    /  TBili  0.3    /  DBili  x      /  AST  13     /  ALT  19     /  AlkPhos  42     20 Oct 2021 08:47    PT/INR - ( 18 Oct 2021 11:42 )   PT: 18.9 sec;   INR: 1.65 ratio         PTT - ( 18 Oct 2021 11:42 )  PTT:43.0 sec  Urinalysis Basic - ( 18 Oct 2021 14:34 )    Color: Yellow / Appearance: Clear / S.010 / pH: x  Gluc: x / Ketone: Trace  / Bili: Negative / Urobili: Negative   Blood: x / Protein: 500 mg/dL / Nitrite: Negative   Leuk Esterase: Negative / RBC: 3-5 /HPF / WBC 0-2   Sq Epi: x / Non Sq Epi: Occasional / Bacteria: Occasional        Culture Results:   <10,000 CFU/mL Normal Urogenital Johnna (10-18 @ 18:35)  Culture Results:   No growth to date. (10-18 @ 16:04)  Culture Results:   No growth to date. (10-18 @ 16:04)      culture blood  -- Clean Catch Clean Catch (Midstream) 10-18 @ 18:35    culture urine  --  10-18 @ 18:35  culture blood  -- .Blood Blood-Peripheral 10-18 @ 16:04    culture urine  --  10-18 @ 16:04      CARDIAC MARKERS ( 19 Oct 2021 02:22 )  .084 ng/mL / x     / x     / x     / x      CARDIAC MARKERS ( 18 Oct 2021 20:09 )  .086 ng/mL / x     / 90 U/L / x     / 1.7 ng/mL  CARDIAC MARKERS ( 18 Oct 2021 11:42 )  .059 ng/mL / x     / x     / x     / x      CARDIAC MARKERS ( 14 Oct 2021 00:59 )  .055 ng/mL / x     / 46 U/L / x     / 1.3 ng/mL  CARDIAC MARKERS ( 13 Oct 2021 19:03 )  .064 ng/mL / x     / 51 U/L / x     / 1.6 ng/mL  CARDIAC MARKERS ( 13 Oct 2021 13:51 )  .063 ng/mL / x     / 51 U/L / x     / 1.2 ng/mL  CARDIAC MARKERS ( 13 Oct 2021 10:41 )  .053 ng/mL / x     / 46 U/L / x     / 1.2 ng/mL          Culture - Urine (collected 18 Oct 2021 18:35)  Source: Clean Catch Clean Catch (Midstream)  Final Report (19 Oct 2021 14:17):    <10,000 CFU/mL Normal Urogenital Johnna    Culture - Blood (collected 18 Oct 2021 16:04)  Source: .Blood Blood-Peripheral  Preliminary Report (19 Oct 2021 17:02):    No growth to date.    Culture - Blood (collected 18 Oct 2021 16:04)  Source: .Blood Blood-Peripheral  Preliminary Report (19 Oct 2021 17:02):    No growth to date.        Serum Pro-Brain Natriuretic Peptide: 88799 pg/mL (10-18-21 @ 11:42)      RADIOLOGY & ADDITIONAL TESTS:      HEALTH ISSUES - PROBLEM Dx:  Acute exacerbation of CHF (congestive heart failure)    Hypertensive urgency    Pneumonia    Elevated troponin    Atrial fibrillation  first documented on EKG 10/7/2021    Stage 3 chronic kidney disease    Anemia    Type 2 diabetes mellitus  not on home insulin    CAD (coronary artery disease)  s/p stents (not on plavix)    HLD (hyperlipidemia)    Depression with anxiety    DVT prophylaxis        Consultant(s) Notes Reviewed:  [ x ] YES     Care Discussed with [X] Consultants  [ x ] Patient  [  ] Family [  ] HCP [  ]   [  ] Social Service  [  ] RN, [  ] Physical Therapy,[  ] Palliative care team  DVT PPX: [  ] Lovenox, [  ] S C Heparin, [  ] Coumadin, [  ] Xarelto, [ x ] Eliquis, [  ] Pradaxa, [  ] IV Heparin drip, [  ] SCD [  ] Contraindication 2 to GI Bleed,[  ] Ambulation [  ] Contraindicated 2 to  bleed [  ] Contraindicated 2 to Brain Bleed  Advanced directive: [ x ] None, [  ] DNR/DNI Patient is a 76y old  Male who presents with a chief complaint of shortness of breath (20 Oct 2021 09:07)    HPI:  77 yo M with PMHx of Type 2 DM (not on insulin), BPH, CAD s/p stents >4 years ago (not on plavix), diverticulitis (hospitalized 2020 at [facility]), GIB 2/2 diverticulosis (), anxiety, Lupus, HTN, HLD, CKD stage 3, HepC, 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 Patient states his SOB started on  and has been worse with laying down. Does admit to intermittent CP associated with the SOB and also when getting imaging as that makes him anxious. Does not appear to have a clear understanding of his medical conditions. Also, states he had some bleeding in his mouth on , but there is no longer bleeding; on exam there is a healing cut on his tongue and inner right cheek with no active bleeding likely from the pt biting his tongue. Admits difficulty laying down straight due to SOB. Denies fevers, chills, abd pain, n/v, sore throat, cough, palpitations  Of note, pt was recently admitted to [facility] on 10/7/21 for hypertensive urgency, Afib and  findings of PNA. was Rxed with IV Zosyn x 7 days  Abx .pt also got 1 u pRBC transfusion past week.    ED Course:   Vitals: BP: 210/96-->181/84, HR: 79, Temp: 98 F, RR: 17, SpO2: 100% on RA-->89% on RA-->98% on 2L NC   Labs: H/H: 9.6/30.6, aPTT: 43, PT/INR: 18.9/1.65, BUN/Cr: 28/2.2, alb: 3.1, GFR: 28, procal: .16, trop: .059, proBNP:10,846  Imaging:   CxR:  Bilateral lung parenchymal airspace opacities, increased from prior. Small pleural effusions suggested  B/l LE Dopplers: No evidence of deep venous thrombosis in either lower extremity.  Bilateral popliteal fossa fluid collections, likely Baker's cysts.  EKG: HR: 74, Atrial fibrillation, Incomplete right bundle branch block, Nonspecific T wave abnormality, Prolonged QT(461)  Received in the ED: Vanco and Zosyn x2, IV lasix 60mg, 10mg Hydralazine IVP x2 (18 Oct 2021 17:24)    INTERVAL HPI:  10/19/21: Patient was seen and examined at bedside. Denies any complaints. States his shortness of breath has improved.   10/20/21: Patient was seen and examined at bedside. States he was not able to sleep well because of another patient screaming across the room. Otherwise denies worsening SOB, chest pain, palpitations.     OVERNIGHT EVENTS: No acute events overnight     Home Medications:  Lasix 40 mg oral tablet: 1 tab(s) orally once a day (18 Oct 2021 17:52)  Pepcid 20 mg oral tablet: 1 tab(s) orally every other day (18 Oct 2021 17:52)      MEDICATIONS  (STANDING):  amLODIPine   Tablet 10 milliGRAM(s) Oral daily  apixaban 5 milliGRAM(s) Oral every 12 hours  ARIPiprazole 30 milliGRAM(s) Oral daily  aspirin enteric coated 81 milliGRAM(s) Oral daily  atorvastatin 10 milliGRAM(s) Oral at bedtime  budesonide 160 MICROgram(s)/formoterol 4.5 MICROgram(s) Inhaler 2 Puff(s) Inhalation two times a day  cloNIDine 0.1 milliGRAM(s) Oral three times a day  cyanocobalamin 1000 MICROGram(s) Oral daily  doxazosin 8 milliGRAM(s) Oral at bedtime  famotidine    Tablet 20 milliGRAM(s) Oral daily  folic acid 1 milliGRAM(s) Oral daily  furosemide   Injectable 40 milliGRAM(s) IV Push every 12 hours  hydrALAZINE 100 milliGRAM(s) Oral three times a day  isosorbide   mononitrate ER Tablet (IMDUR) 30 milliGRAM(s) Oral daily  lamoTRIgine 100 milliGRAM(s) Oral daily  mirtazapine 30 milliGRAM(s) Oral at bedtime  NIFEdipine XL 60 milliGRAM(s) Oral daily  potassium chloride    Tablet ER 40 milliEquivalent(s) Oral every 4 hours  venlafaxine XR. 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 No nausea, No vomiting; No diarrhea, No constipation. [  ] BM  GENITOURINARY: No dysuria, No frequency, No urgency, No hematuria, NO incontinence  NEUROLOGICAL: No headaches, No dizziness, No numbness, No tingling, No tremors, No weakness  EXT: No Swelling, No Pain, No Edema  SKIN:  [ x ] No itching, burning, rashes, or lesions   MUSCULOSKELETAL: No joint pain ,No Jt swelling; No muscle pain, No back pain, No extremity pain  PSYCHIATRIC: No depression,  No anxiety,  No mood swings ,No difficulty sleeping at night  PAIN SCALE: [ x ] None  [  ] Other-  ROS Unable to obtain due to - [  ] Dementia  [  ] Lethargy [  ] Drowsy [  ] Sedated [  ] non verbal  REST OF REVIEW Of SYSTEM - [ x ] Normal     Vital Signs Last 24 Hrs  T(C): 36.7 (20 Oct 2021 05:05), Max: 36.7 (20 Oct 2021 05:05)  T(F): 98.1 (20 Oct 2021 05:05), Max: 98.1 (20 Oct 2021 05:05)  HR: 69 (20 Oct 2021 05:05) (68 - 69)  BP: 149/69 (20 Oct 2021 05:05) (134/70 - 162/76)  BP(mean): --  RR: 17 (20 Oct 2021 05:05) (17 - 18)  SpO2: 95% (20 Oct 2021 05:05) (95% - 99%)  Finger Stick        10- @ 07:01  -  10-20 @ 07:00  --------------------------------------------------------  IN: 0 mL / OUT: 850 mL / NET: -850 mL    10-20 @ 07:01  -  10-20 @ 10:23  --------------------------------------------------------  IN: 0 mL / OUT: 400 mL / NET: -400 mL        PHYSICAL EXAM:  GENERAL:  [x  ] NAD , [ x ] well appearing, [  ] Agitated, [  ] Drowsy,  [  ] Lethargy, [  ] confused   HEAD:  [ x ] Normal, [  ] Other  EYES:  [  x] EOMI, [  ] PERRLA, [x  ] conjunctiva and sclera clear normal, [  ] Other,  [  ] Pallor,[  ] Discharge  ENMT:  [ x ] Normal, [  ] Moist mucous membranes, [  ] Good dentition, [  ] No Thrush  NECK:  [ x ] Supple, [ x ] No JVD, [  ] Normal thyroid, [  ] Lymphadenopathy [  ] Other  CHEST/LUNG:  [ x ] Clear to auscultation bilaterally, [ x ] Breath Sounds equal B/L  [  ] poor effort  [ x ] No rales, [ x ] No rhonchi  [ x ]  No wheezing,   HEART:  [ x ] Regular rate and rhythm, [  ] tachycardia, [  ] Bradycardia,  [  ] irregular  [ x ] No murmurs, No rubs, No gallops, [  ] PPM in place (Mfr:  )  ABDOMEN:  [ x ] Soft, [ x ] Nontender, [ x ] Nondistended, [  ] No mass, [  ] Bowel sounds present, [  ] obese  NERVOUS SYSTEM:  [ x ] Alert & Oriented X3, [  ] Nonfocal  [  ] Confusion  [  ] Encephalopathic [  ] Sedated [  ] Unable to assess, [  ] Dementia [  ] Other-  EXTREMITIES: [ x ] 2+ Peripheral Pulses, No clubbing, No cyanosis,  [  ] edema B/L lower EXT. [  ] PVD stasis skin changes B/L Lower EXT, [  ] wound  LYMPH: No lymphadenopathy noted  SKIN:  [ x ] No rashes or lesions, [  ] Pressure Ulcers, [  ] ecchymosis, [  ] Skin Tears, [  ] Other    DIET: Diet, Dysphagia 3 Soft-Thin Liquids:   Low Sodium (10-19-21 @ 09:33)      LABS:                        8.0    7.38  )-----------( 295      ( 20 Oct 2021 08:47 )             26.4     20 Oct 2021 08:47    142    |  109    |  42     ----------------------------<  110    3.1     |  26     |  2.80     Ca    8.1        20 Oct 2021 08:47    TPro  6.1    /  Alb  2.4    /  TBili  0.3    /  DBili  x      /  AST  13     /  ALT  19     /  AlkPhos  42     20 Oct 2021 08:47    PT/INR - ( 18 Oct 2021 11:42 )   PT: 18.9 sec;   INR: 1.65 ratio         PTT - ( 18 Oct 2021 11:42 )  PTT:43.0 sec  Urinalysis Basic - ( 18 Oct 2021 14:34 )    Color: Yellow / Appearance: Clear / S.010 / pH: x  Gluc: x / Ketone: Trace  / Bili: Negative / Urobili: Negative   Blood: x / Protein: 500 mg/dL / Nitrite: Negative   Leuk Esterase: Negative / RBC: 3-5 /HPF / WBC 0-2   Sq Epi: x / Non Sq Epi: Occasional / Bacteria: Occasional        Culture Results:   <10,000 CFU/mL Normal Urogenital Johnna (10-18 @ 18:35)  Culture Results:   No growth to date. (10-18 @ 16:04)  Culture Results:   No growth to date. (10-18 @ 16:04)      culture blood  -- Clean Catch Clean Catch (Midstream) 10-18 @ 18:35    culture urine  --  10-18 @ 18:35  culture blood  -- .Blood Blood-Peripheral 10-18 @ 16:04    culture urine  --  10-18 @ 16:04      CARDIAC MARKERS ( 19 Oct 2021 02:22 )  .084 ng/mL / x     / x     / x     / x      CARDIAC MARKERS ( 18 Oct 2021 20:09 )  .086 ng/mL / x     / 90 U/L / x     / 1.7 ng/mL  CARDIAC MARKERS ( 18 Oct 2021 11:42 )  .059 ng/mL / x     / x     / x     / x      CARDIAC MARKERS ( 14 Oct 2021 00:59 )  .055 ng/mL / x     / 46 U/L / x     / 1.3 ng/mL  CARDIAC MARKERS ( 13 Oct 2021 19:03 )  .064 ng/mL / x     / 51 U/L / x     / 1.6 ng/mL  CARDIAC MARKERS ( 13 Oct 2021 13:51 )  .063 ng/mL / x     / 51 U/L / x     / 1.2 ng/mL  CARDIAC MARKERS ( 13 Oct 2021 10:41 )  .053 ng/mL / x     / 46 U/L / x     / 1.2 ng/mL          Culture - Urine (collected 18 Oct 2021 18:35)  Source: Clean Catch Clean Catch (Midstream)  Final Report (19 Oct 2021 14:17):    <10,000 CFU/mL Normal Urogenital Johnna    Culture - Blood (collected 18 Oct 2021 16:04)  Source: .Blood Blood-Peripheral  Preliminary Report (19 Oct 2021 17:02):    No growth to date.    Culture - Blood (collected 18 Oct 2021 16:04)  Source: .Blood Blood-Peripheral  Preliminary Report (19 Oct 2021 17:02):    No growth to date.        Serum Pro-Brain Natriuretic Peptide: 23085 pg/mL (10-18-21 @ 11:42)      RADIOLOGY & ADDITIONAL TESTS:      HEALTH ISSUES - PROBLEM Dx:  Acute exacerbation of CHF (congestive heart failure)    Hypertensive urgency    Pneumonia    Elevated troponin    Atrial fibrillation  first documented on EKG 10/7/2021    Stage 3 chronic kidney disease    Anemia    Type 2 diabetes mellitus  not on home insulin    CAD (coronary artery disease)  s/p stents (not on plavix)    HLD (hyperlipidemia)    Depression with anxiety    DVT prophylaxis        Consultant(s) Notes Reviewed:  [ x ] YES     Care Discussed with [X] Consultants  [ x ] Patient  [  ] Family [  ] HCP [  ]   [ x ] Social Service  [ x ] RN, [  ] Physical Therapy,[  ] Palliative care team  DVT PPX: [  ] Lovenox, [  ] S C Heparin, [  ] Coumadin, [  ] Xarelto, [ x ] Eliquis, [  ] Pradaxa, [  ] IV Heparin drip, [  ] SCD [  ] Contraindication 2 to GI Bleed,[  ] Ambulation [  ] Contraindicated 2 to  bleed [  ] Contraindicated 2 to Brain Bleed  Advanced directive: [ x ] None, [  ] DNR/DNI

## 2021-10-20 NOTE — SWALLOW VFSS/MBS ASSESSMENT ADULT - COMMENTS
Patient received in Radiology Suite this AM for Modified Barium Swallow Study, at which time he was alert, cooperative, and denied pain. Patient familiar to this department and was seen for bedside swallow assessment on 10/19/21, at which time a soft solid and thin liquid diet level and MBSS were recommended. Patient on supplemental O2 via NC, 2L.     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 Pt was Rxed for PNA last week in Hospital. Admitted for CHF exacerbation and HTN urgency."    WBC is WFL. CT of the chest revealed, "1. Small bilateral pleural effusions. 2. Multifocal bilateral pneumonia, organism nonspecific, does not have the characteristic appearance of COVID-19 but is within range of what can be seen with COVID-19 pneumonia."    Discussed results with Patient and call out to MD.

## 2021-10-20 NOTE — PROGRESS NOTE ADULT - ASSESSMENT
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 admitted for CHF exacerbation and HTN urgency.

## 2021-10-20 NOTE — SWALLOW VFSS/MBS ASSESSMENT ADULT - ADDITIONAL RECOMMENDATIONS
This department will continue to follow Patient for dysphagia remediation during admission, as schedule permits. Recommend swallowing therapy upon discharge from [facility] to maximize swallow function (rehab vs. home care vs. outpatient). Outpatient speech services can be scheduled at [facility]: [phone]

## 2021-10-20 NOTE — PROGRESS NOTE ADULT - SUBJECTIVE AND OBJECTIVE BOX
Patient is a 76y old  Male who presents with a chief complaint of shortness of breath (19 Oct 2021 09:10)  Patient seen in follow up for CKD 4, fluid overload.        PAST MEDICAL HISTORY:  Hypertension    Diabetes mellitus    Lupus    Hepatitis C    Anxiety and depression    CAD (coronary artery disease)    Diverticulosis    Hyperlipidemia    HTN (hypertension)    HLD (hyperlipidemia)    Atrial fibrillation    CAD (coronary artery disease)    Type 2 diabetes mellitus    Anxiety    History of diverticulitis    Diverticulosis    Afib    Stage 3 chronic kidney disease    Anemia of chronic disease    Chronic diastolic congestive heart failure    Multiple thyroid nodules      MEDICATIONS  (STANDING):  amLODIPine   Tablet 10 milliGRAM(s) Oral daily  apixaban 5 milliGRAM(s) Oral every 12 hours  ARIPiprazole 30 milliGRAM(s) Oral daily  aspirin enteric coated 81 milliGRAM(s) Oral daily  atorvastatin 10 milliGRAM(s) Oral at bedtime  budesonide 160 MICROgram(s)/formoterol 4.5 MICROgram(s) Inhaler 2 Puff(s) Inhalation two times a day  cloNIDine 0.1 milliGRAM(s) Oral three times a day  cyanocobalamin 1000 MICROGram(s) Oral daily  doxazosin 8 milliGRAM(s) Oral at bedtime  famotidine    Tablet 20 milliGRAM(s) Oral daily  folic acid 1 milliGRAM(s) Oral daily  furosemide   Injectable 40 milliGRAM(s) IV Push every 12 hours  hydrALAZINE 100 milliGRAM(s) Oral three times a day  isosorbide   mononitrate ER Tablet (IMDUR) 30 milliGRAM(s) Oral daily  lamoTRIgine 100 milliGRAM(s) Oral daily  mirtazapine 30 milliGRAM(s) Oral at bedtime  NIFEdipine XL 60 milliGRAM(s) Oral daily  potassium chloride    Tablet ER 40 milliEquivalent(s) Oral every 4 hours  venlafaxine XR. 150 milliGRAM(s) Oral daily    MEDICATIONS  (PRN):    T(C): 36.7 (10-20-21 @ 05:05), Max: 36.9 (10-18-21 @ 21:45)  HR: 69 (10-20-21 @ 05:05) (62 - 82)  BP: 149/69 (10-20-21 @ 05:05) (130/72 - 209/96)  RR: 17 (10-20-21 @ 05:05)  SpO2: 95% (10-20-21 @ 05:05)  Wt(kg): --  I&O's Detail    19 Oct 2021 07:01  -  20 Oct 2021 07:00  --------------------------------------------------------  IN:  Total IN: 0 mL    OUT:    Voided (mL): 850 mL  Total OUT: 850 mL    Total NET: -850 mL      20 Oct 2021 07:01  -  20 Oct 2021 11:05  --------------------------------------------------------  IN:  Total IN: 0 mL    OUT:    Voided (mL): 400 mL  Total OUT: 400 mL    Total NET: -400 mL              PHYSICAL EXAM:  General: + sob  Respiratory: b/l air entry  Cardiovascular: S1 S2  Gastrointestinal: soft  Extremities: + edema                          LABORATORY:                        8.0    7.38  )-----------( 295      ( 20 Oct 2021 08:47 )             26.4     10-20    142  |  109<H>  |  42<H>  ----------------------------<  110<H>  3.1<L>   |  26  |  2.80<H>    Ca    8.1<L>      20 Oct 2021 08:47  Phos  4.5     10-19  Mg     2.3     10-19    TPro  6.1  /  Alb  2.4<L>  /  TBili  0.3  /  DBili  x   /  AST  13<L>  /  ALT  19  /  AlkPhos  42  10-20    Sodium, Serum: 142 mmol/L (10-20 @ 08:47)  Sodium, Serum: 142 mmol/L (10-19 @ 08:05)  Sodium, Serum: 142 mmol/L (10-18 @ 11:42)    Potassium, Serum: 3.1 mmol/L (10-20 @ 08:47)  Potassium, Serum: 3.4 mmol/L (10-19 @ 08:05)  Potassium, Serum: 3.7 mmol/L (10-18 @ 11:42)    Hemoglobin: 8.0 g/dL (10-20 @ 08:47)  Hemoglobin: 9.0 g/dL (10-19 @ 08:05)  Hemoglobin: 9.6 g/dL (10-18 @ 11:42)    Creatinine, Serum 2.80 (10-20 @ 08:47)  Creatinine, Serum 2.60 (10-19 @ 08:05)  Creatinine, Serum 2.20 (10-18 @ 11:42)    CARDIAC MARKERS ( 19 Oct 2021 02:22 )  .084 ng/mL / x     / x     / x     / x      CARDIAC MARKERS ( 18 Oct 2021 20:09 )  .086 ng/mL / x     / 90 U/L / x     / 1.7 ng/mL  CARDIAC MARKERS ( 18 Oct 2021 11:42 )  .059 ng/mL / x     / x     / x     / x          LIVER FUNCTIONS - ( 20 Oct 2021 08:47 )  Alb: 2.4 g/dL / Pro: 6.1 g/dL / ALK PHOS: 42 U/L / ALT: 19 U/L / AST: 13 U/L / GGT: x           Urinalysis Basic - ( 18 Oct 2021 14:34 )    Color: Yellow / Appearance: Clear / S.010 / pH: x  Gluc: x / Ketone: Trace  / Bili: Negative / Urobili: Negative   Blood: x / Protein: 500 mg/dL / Nitrite: Negative   Leuk Esterase: Negative / RBC: 3-5 /HPF / WBC 0-2   Sq Epi: x / Non Sq Epi: Occasional / Bacteria: Occasional

## 2021-10-20 NOTE — PROGRESS NOTE ADULT - ASSESSMENT
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    SLP eval in progress -   vs noted  labs reviewed  on LASIX IV for CHF    completed ABX for PNA  ct chest from recent admission reviewed, cxr noted, repeat CT chest reviewed - multifocal pna reported - however - clinically does not correlate -   would monitor off ABX - biomarkers - cx - monitor VS and Sat  assess - eval for HF - vol overload - Diuresis - I and O - cardio eval - TTE reviewed  ELMER - does not tolerate CPAP  CKD - monitor renal indices - i and o - replete lytes  dysphagia diet - asp prec - HOB elev - oral hygiene  assist with needs - ADL  emotional support  tele monitor  old records reviewed

## 2021-10-20 NOTE — PROGRESS NOTE ADULT - ATTENDING COMMENTS
Remains hypervolemic on exam not responding to current treatment . Intensify diuretic therapy by increasing IV Lasix to BID dosing.  Monitor I, O, K, creatinine closely.  To follow. At risk for abrupt decompensation.

## 2021-10-20 NOTE — BH CONSULTATION LIAISON ASSESSMENT NOTE - NSBHCHARTREVIEWLAB_PSY_A_CORE FT
8.0    7.38  )-----------( 295      ( 20 Oct 2021 08:47 )             26.4   10-20    142  |  109<H>  |  42<H>  ----------------------------<  110<H>  3.1<L>   |  26  |  2.80<H>    Ca    8.1<L>      20 Oct 2021 08:47  Phos  4.5     10-19  Mg     2.3     10-19    TPro  6.1  /  Alb  2.4<L>  /  TBili  0.3  /  DBili  x   /  AST  13<L>  /  ALT  19  /  AlkPhos  42  10-20

## 2021-10-20 NOTE — SWALLOW VFSS/MBS ASSESSMENT ADULT - ROSENBEK'S PENETRATION ASPIRATION SCALE
(1) no aspiration, contrast does not enter airway with thin liquids and with nectar thick liquids x1/(2) contrast enters airway, remains above the vocal cords, no residue remains (penetration)

## 2021-10-20 NOTE — PROGRESS NOTE ADULT - SUBJECTIVE AND OBJECTIVE BOX
[facility] -- Fifi Bliss, Bonifacio, Kalpesh, Abraham Sheridan Savella  Office # [phone]      Follow Up:    afib LE edema   Subjective/Observations:     No events overnight resting comfortably in bed.  No complaints of chest pain, palpitations reported. No signs of orthopnea or PND.  + dyspnea   REVIEW OF SYSTEMS: All other review of systems is negative unless indicated above    PAST MEDICAL & SURGICAL HISTORY:  HTN (hypertension)  c/b multiple episodes of hypertensive urgency    HLD (hyperlipidemia)    Atrial fibrillation  first documented on EKG 10/7/2021    CAD (coronary artery disease)  s/p stents (not on plavix)    Type 2 diabetes mellitus  not on home insulin/ Meds    Anxiety  Depression  multiple psych medications    History of diverticulitis  07/2021    Diverticulosis  c/b GIB in 2020    Afib  on AC    Stage 3 chronic kidney disease    Anemia of chronic disease  Monoclonal Gammopathy-MGUS  pRBC transfusion 10/15/21    Chronic diastolic congestive heart failure    Multiple thyroid nodules    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  Had surgery ( April 2013 )    S/P tonsillectomy    S/P arthroscopic knee surgery  Bilateral ( 2005 )    Torsion of testicle  Had surgery at age 14    Pilonidal cyst  Had surgery ( 1969 )    S/P cataract surgery  Bilateral    H/O hernia repair        MEDICATIONS  (STANDING):  amLODIPine   Tablet 10 milliGRAM(s) Oral daily  apixaban 5 milliGRAM(s) Oral every 12 hours  ARIPiprazole 30 milliGRAM(s) Oral daily  aspirin enteric coated 81 milliGRAM(s) Oral daily  atorvastatin 10 milliGRAM(s) Oral at bedtime  budesonide 160 MICROgram(s)/formoterol 4.5 MICROgram(s) Inhaler 2 Puff(s) Inhalation two times a day  cloNIDine 0.1 milliGRAM(s) Oral three times a day  cyanocobalamin 1000 MICROGram(s) Oral daily  doxazosin 8 milliGRAM(s) Oral at bedtime  famotidine    Tablet 20 milliGRAM(s) Oral daily  folic acid 1 milliGRAM(s) Oral daily  furosemide   Injectable 40 milliGRAM(s) IV Push every 12 hours  hydrALAZINE 100 milliGRAM(s) Oral three times a day  isosorbide   mononitrate ER Tablet (IMDUR) 30 milliGRAM(s) Oral daily  lamoTRIgine 100 milliGRAM(s) Oral daily  mirtazapine 30 milliGRAM(s) Oral at bedtime  NIFEdipine XL 60 milliGRAM(s) Oral daily  venlafaxine XR. 150 milliGRAM(s) Oral daily    MEDICATIONS  (PRN):      Allergies    No Known Allergies    Intolerances        Vital Signs Last 24 Hrs  T(C): 36.7 (20 Oct 2021 05:05), Max: 36.7 (20 Oct 2021 05:05)  T(F): 98.1 (20 Oct 2021 05:05), Max: 98.1 (20 Oct 2021 05:05)  HR: 69 (20 Oct 2021 05:05) (68 - 69)  BP: 149/69 (20 Oct 2021 05:05) (134/70 - 162/76)  BP(mean): --  RR: 17 (20 Oct 2021 05:05) (17 - 18)  SpO2: 95% (20 Oct 2021 05:05) (95% - 99%)    I&O's Summary    19 Oct 2021 07:01  -  20 Oct 2021 07:00  --------------------------------------------------------  IN: 0 mL / OUT: 850 mL / NET: -850 mL    20 Oct 2021 07:01  -  20 Oct 2021 09:08  --------------------------------------------------------  IN: 0 mL / OUT: 400 mL / NET: -400 mL          PHYSICAL EXAM:  TELE: SR   Constitutional: NAD, awake and alert, obese   HEENT: Moist Mucous Membranes, Anicteric  Pulmonary: Non-labored, breath sounds are diminished bilaterally   Cardiovascular: Regular, S1 and S2 nl, No murmurs, rubs, gallops or clicks  Gastrointestinal: Bowel Sounds present, soft, nontender.   Lymph: +bilateral peripheral edema.  Skin: No visible rashes or ulcers.  Psych:  Mood & affect appropriate    LABS: All Labs Reviewed:                        8.0    7.38  )-----------( 295      ( 20 Oct 2021 08:47 )             26.4                         9.0    6.10  )-----------( 324      ( 19 Oct 2021 08:05 )             29.3                         9.6    7.53  )-----------( 340      ( 18 Oct 2021 11:42 )             30.6     19 Oct 2021 08:05    142    |  110    |  34     ----------------------------<  136    3.4     |  25     |  2.60   18 Oct 2021 11:42    142    |  110    |  28     ----------------------------<  100    3.7     |  25     |  2.20     Ca    8.9        19 Oct 2021 08:05  Ca    9.5        18 Oct 2021 11:42  Phos  4.5       19 Oct 2021 08:05  Mg     2.3       19 Oct 2021 08:05  Mg     2.3       18 Oct 2021 11:42    TPro  7.1    /  Alb  2.8    /  TBili  0.3    /  DBili  x      /  AST  16     /  ALT  22     /  AlkPhos  45     19 Oct 2021 08:05  TPro  7.9    /  Alb  3.1    /  TBili  0.4    /  DBili  x      /  AST  19     /  ALT  24     /  AlkPhos  50     18 Oct 2021 11:42    PT/INR - ( 18 Oct 2021 11:42 )   PT: 18.9 sec;   INR: 1.65 ratio         PTT - ( 18 Oct 2021 11:42 )  PTT:43.0 sec  CARDIAC MARKERS ( 19 Oct 2021 02:22 )  .084 ng/mL / x     / x     / x     / x      CARDIAC MARKERS ( 18 Oct 2021 20:09 )  .086 ng/mL / x     / 90 U/L / x     / 1.7 ng/mL  CARDIAC MARKERS ( 18 Oct 2021 11:42 )  .059 ng/mL / x     / x     / x     / x              12 Lead ECG:   Ventricular Rate 74 BPM  Atrial Rate 70 BPM  QRS Duration 114 ms  Q-T Interval 416 ms  QTC Calculation(Bazett) 461 ms  R Axis -10 degrees  T Axis 52 degrees    Diagnosis Line Atrial fibrillation  Incomplete right bundle branch block  Moderate voltage criteria for LVH, may be normal variant  Nonspecific T wave abnormality  Prolonged QT  Abnormal ECG  Confirmed by Victorino Valdovinos MD ([id number]) on 10/18/2021 1:39:20 PM (10-18-21 @ 12:24)    < from: US Duplex Venous Lower Ext Complete, Bilateral (10.18.21 @ 14:03) >    EXAM:  US DPLX LWR EXT VEINS COMPL BI                            PROCEDURE DATE:  10/18/2021          INTERPRETATION:  CLINICAL INFORMATION: Lower extremity edema    COMPARISON: 07/20/2021.    TECHNIQUE: Duplex sonography of the BILATERAL LOWER extremity veins with color and spectral Doppler, with and without compression.    FINDINGS:    RIGHT:  Normal compressibility of the RIGHT common femoral, femoral and popliteal veins.  Doppler examination shows normal spontaneous and phasic flow.  No RIGHT calf vein thrombosis is detected.  Right popliteal fluid collection measures 4.2 x 3.3 x 1.3 cm.    LEFT:  Normal compressibility of the LEFT common femoral, femoral and popliteal veins.  Doppler examination shows normal spontaneous and phasic flow.  No LEFT calf vein thrombosis is detected.  Left popliteal fossa fluid collection measuring 4.9 x 2.8 x 1.3 cm.    IMPRESSION:  No evidence of deep venous thrombosis in either lower extremity.  Bilateral popliteal fossa fluid collections, likely Baker's cysts.        --- End of Report ---            ROMMEL GALEANO MD; Attending Radiologist  This document has been electronically signed. Oct 18 2021  2:21PM    < end of copied text >  < from: Xray Chest 1 View-PORTABLE IMMEDIATE (10.18.21 @ 12:58) >    EXAM:  XR CHEST PORTABLE IMMED 1V                            PROCEDURE DATE:  10/18/2021          INTERPRETATION:  INDICATION: Sepsis    PRIORS: 10/6/2021    VIEWS: Portable AP radiography of the chest performed.    FINDINGS: Enlargement of the cardiac silhouette, likely related to cardiomegaly. No superior mediastinal widening. Bilateral lung parenchymal airspace opacities, increased from prior. Small pleural effusions suggested. No evidence for pneumothorax. No mediastinal shift. No acute osseous fractures.    IMPRESSION: Bilateral lung parenchymal airspace opacities, increased from prior. Small pleural effusions suggested.        < from: CT Chest No Cont (10.18.21 @ 19:26) >  :  CT CHEST                            PROCEDURE DATE:  10/18/2021          INTERPRETATION:  PROCEDURE INFORMATION:  Exam: CT Chest Without Contrast; Diagnostic  Exam date and time: 10/18/2021 7:20 PM  Age: 76 years old  Clinical indication: Shortness of breath    TECHNIQUE:  Imaging protocol: Diagnostic computed tomography of the chest without contrast.    COMPARISON:  CT CHEST 10/6/2021    FINDINGS:  Lungs: Multifocal bilateral pneumonia, organism nonspecific, does not have the characteristic appearance of COVID-19 but is within range of what can be seen with COVID-19 pneumonia.  Pleural spaces: Small bilateral pleural effusions.  Heart: Cardiomegaly.  Aorta: Atherosclerotic calcifications of thoracic aorta and coronary arteries. No aortic aneurysm.  Lymph nodes: Unremarkable. No enlarged lymph nodes.    Bones/joints: Degenerative changes. No acute fracture.  Soft tissues: Unremarkable.  Upper abdomen: Right renal cysts. Nonspecific left adrenal thickening. Colonic diverticulosis.    IMPRESSION:  1. Small bilateral pleural effusions.  2. Multifocal bilateral pneumonia, organism nonspecific, does not have the characteristic appearance of COVID-19 but is within range of what can be seen with COVID-    < end of copied text >

## 2021-10-20 NOTE — PROGRESS NOTE ADULT - ATTENDING COMMENTS
77 yo M with PMHx of Type 2 DM (not on insulin), BPH, CAD s/p stents >4 years ago (not on plavix),  Chronic Diastolic CHF ,Anemia -MGUS , diverticulitis (hospitalized 07/2020 at 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 presenting to the ED with SOB. Pt was already Rxed for PNA with IV Zosyn last week,  Admitted for ac on Chronic Diastolic  CHF exacerbation and HTN urgency.  Pt seen, Examined, case & care plan d/w pt, residents at detail.  -Concern for NON Compliance with meds /supervision at group home.  Consults -  Pulmonary-Dr Day D/W   Cardio-DR Posey following -IV Lasix   Psych-DR Winkler d/w at detail, patient appears to have limited insight into medication   AM Labs.  PT Eval.---> NO PT need  Social service Eval. d/w at detail,

## 2021-10-20 NOTE — BH CONSULTATION LIAISON ASSESSMENT NOTE - HPI (INCLUDE ILLNESS QUALITY, SEVERITY, DURATION, TIMING, CONTEXT, MODIFYING FACTORS, ASSOCIATED SIGNS AND SYMPTOMS)
Patient seen, evaluated and chart reviewed. 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 Patient states his SOB started on Sunday and has been worse with laying down. Does admit to intermittent CP associated with the SOB and also when getting imaging as that makes him anxious. Does not appear to have a clear understanding of his medical conditions. Also, states he had some bleeding in his mouth on Sunday, but there is no longer bleeding; on exam there is a healing cut on his tongue and inner right cheek with no active bleeding likely from the pt biting his tongue. Admits difficulty laying down straight due to SOB. Denies fevers, chills, abd pain, n/v, sore throat, cough, palpitations  Of note, pt was recently admitted to [facility] on 10/7/21 for hypertensive urgency, Afib and  findings of PNA. was Rxed with IV Zosyn x 7 days  Abx .pt also got 1 u pRBC transfusion past week.   Patient reports history of mood disorder treated by [facility] team. Currently calm and cooperative, denies symptoms of psychosis, irineo or depression. The issue of non-compliance was discussed, patient appears to have limited insight into the issue.

## 2021-10-20 NOTE — PROGRESS NOTE ADULT - PROBLEM SELECTOR PLAN 2
- Pt presented with /96 --- unclear if pt is compliant with his medications as he does not appear to have a good understanding of his medical conditions --- Psych, Dr. Winkler, consulted for assessment of capacity  - No vision changes, headache, AREILLA, current CP, ECHO done.  - s/p Hydralazine 10mg IVP x2 SBP improved to 160's  - C/w with home antihypertensives:   -  Continue clonidine 0.1 TID  - On hydralazine 100 mg q 8 hrs   - continue amlodipine 10 mg qd  - continue nifedipine XL  - continue  Imdur 30mg   - Cardio, Dr. Bliss's group, following.

## 2021-10-20 NOTE — PROGRESS NOTE ADULT - SUBJECTIVE AND OBJECTIVE BOX
Date/Time Patient Seen:  		  Referring MD:   Data Reviewed	       Patient is a 76y old  Male who presents with a chief complaint of shortness of breath (19 Oct 2021 14:05)      Subjective/HPI     PAST MEDICAL & SURGICAL HISTORY:  Hypertension    Diabetes mellitus    Lupus    Hepatitis C    Anxiety and depression    CAD (coronary artery disease)  s/p stents    Diverticulosis    Hyperlipidemia    HTN (hypertension)  c/b multiple episodes of hypertensive urgency    HLD (hyperlipidemia)    Atrial fibrillation  first documented on EKG 10/7/2021    CAD (coronary artery disease)  s/p stents (not on plavix)    Type 2 diabetes mellitus  not on home insulin/ Meds    Anxiety  Depression  multiple psych medications    History of diverticulitis  07/2021    Diverticulosis  c/b GIB in 2020    Afib  on AC    Stage 3 chronic kidney disease    Anemia of chronic disease  Monoclonal Gammopathy-MGUS  pRBC transfusion 10/15/21    Chronic diastolic congestive heart failure    Multiple thyroid nodules    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  Had surgery ( April 2013 )    S/P tonsillectomy    S/P arthroscopic knee surgery  Bilateral ( 2005 )    Torsion of testicle  Had surgery at age 14    Pilonidal cyst  Had surgery ( 1969 )    S/P cataract surgery  Bilateral    H/O hernia repair          Medication list         MEDICATIONS  (STANDING):  amLODIPine   Tablet 10 milliGRAM(s) Oral daily  apixaban 5 milliGRAM(s) Oral every 12 hours  ARIPiprazole 30 milliGRAM(s) Oral daily  aspirin enteric coated 81 milliGRAM(s) Oral daily  atorvastatin 10 milliGRAM(s) Oral at bedtime  budesonide 160 MICROgram(s)/formoterol 4.5 MICROgram(s) Inhaler 2 Puff(s) Inhalation two times a day  cloNIDine 0.1 milliGRAM(s) Oral three times a day  cyanocobalamin 1000 MICROGram(s) Oral daily  doxazosin 8 milliGRAM(s) Oral at bedtime  famotidine    Tablet 20 milliGRAM(s) Oral daily  folic acid 1 milliGRAM(s) Oral daily  furosemide   Injectable 40 milliGRAM(s) IV Push every 12 hours  hydrALAZINE 100 milliGRAM(s) Oral three times a day  isosorbide   mononitrate ER Tablet (IMDUR) 30 milliGRAM(s) Oral daily  lamoTRIgine 100 milliGRAM(s) Oral daily  mirtazapine 30 milliGRAM(s) Oral at bedtime  NIFEdipine XL 60 milliGRAM(s) Oral daily  venlafaxine XR. 150 milliGRAM(s) Oral daily    MEDICATIONS  (PRN):         Vitals log        ICU Vital Signs Last 24 Hrs  T(C): 36.7 (20 Oct 2021 05:05), Max: 36.7 (20 Oct 2021 05:05)  T(F): 98.1 (20 Oct 2021 05:05), Max: 98.1 (20 Oct 2021 05:05)  HR: 69 (20 Oct 2021 05:05) (68 - 69)  BP: 149/69 (20 Oct 2021 05:05) (134/70 - 162/76)  BP(mean): --  ABP: --  ABP(mean): --  RR: 17 (20 Oct 2021 05:05) (17 - 18)  SpO2: 95% (20 Oct 2021 05:05) (95% - 99%)           Input and Output:  I&O's Detail    19 Oct 2021 07:01  -  20 Oct 2021 05:59  --------------------------------------------------------  IN:  Total IN: 0 mL    OUT:    Voided (mL): 850 mL  Total OUT: 850 mL    Total NET: -850 mL          Lab Data                        9.0    6.10  )-----------( 324      ( 19 Oct 2021 08:05 )             29.3     10-19    142  |  110<H>  |  34<H>  ----------------------------<  136<H>  3.4<L>   |  25  |  2.60<H>    Ca    8.9      19 Oct 2021 08:05  Phos  4.5     10-19  Mg     2.3     10-19    TPro  7.1  /  Alb  2.8<L>  /  TBili  0.3  /  DBili  x   /  AST  16  /  ALT  22  /  AlkPhos  45  10-19      CARDIAC MARKERS ( 19 Oct 2021 02:22 )  .084 ng/mL / x     / x     / x     / x      CARDIAC MARKERS ( 18 Oct 2021 20:09 )  .086 ng/mL / x     / 90 U/L / x     / 1.7 ng/mL  CARDIAC MARKERS ( 18 Oct 2021 11:42 )  .059 ng/mL / x     / x     / x     / x            Review of Systems	      Objective     Physical Examination    heart s1s2  lung dec BS  abd soft      Pertinent Lab findings & Imaging      Nikko:  NO   Adequate UO     I&O's Detail    19 Oct 2021 07:01  -  20 Oct 2021 05:59  --------------------------------------------------------  IN:  Total IN: 0 mL    OUT:    Voided (mL): 850 mL  Total OUT: 850 mL    Total NET: -850 mL               Discussed with:     Cultures:	        Radiology

## 2021-10-20 NOTE — BH CONSULTATION LIAISON ASSESSMENT NOTE - NS ED BHA REVIEW OF ED CHART AVAILABLE LABS REVIEWED
Rec'd refill request from pharmacy for refill on trazodone. Per patient's medical record patient is taking 2 (100 mg) tablets at bedtime, instead of 100 mg. Script was originally sent to [facility] however patient requesting script to be sent to [facility]. Script sent to pharmacy.   
Yes

## 2021-10-20 NOTE — PROGRESS NOTE ADULT - PROBLEM SELECTOR PLAN 3
- Pt was recent admitted to [facility] from 10/7-10/16 and treated for PNA with IV Zosyn & Augmentin x 7 days   - Although CxR shows bilateral lung parenchymal airspace opacities, increased from prior pt does not have leukocytosis or fever and it is suspected SOB is more so 2/2 CHF exacerbation. CxR findings are suspected to be from previously treated PNA  - s/p vanc and zosyn in ED. will hold off on further Abx at this time.as per Pulmonary Dr Day   - F/u Cultures: pending results, Nl Lactate   - official S&S: dysphagis 3, soft solids   - Pulm, Dr. Day,- Keep Off Abx , glorialey Old PNA   Continue Symbicort 2x day.

## 2021-10-20 NOTE — BH CONSULTATION LIAISON ASSESSMENT NOTE - CURRENT MEDICATION
MEDICATIONS  (STANDING):  amLODIPine   Tablet 10 milliGRAM(s) Oral daily  apixaban 5 milliGRAM(s) Oral every 12 hours  ARIPiprazole 30 milliGRAM(s) Oral daily  aspirin enteric coated 81 milliGRAM(s) Oral daily  atorvastatin 10 milliGRAM(s) Oral at bedtime  budesonide 160 MICROgram(s)/formoterol 4.5 MICROgram(s) Inhaler 2 Puff(s) Inhalation two times a day  cloNIDine 0.1 milliGRAM(s) Oral three times a day  cyanocobalamin 1000 MICROGram(s) Oral daily  doxazosin 8 milliGRAM(s) Oral at bedtime  famotidine    Tablet 20 milliGRAM(s) Oral daily  folic acid 1 milliGRAM(s) Oral daily  furosemide   Injectable 40 milliGRAM(s) IV Push every 12 hours  hydrALAZINE 100 milliGRAM(s) Oral three times a day  isosorbide   mononitrate ER Tablet (IMDUR) 30 milliGRAM(s) Oral daily  lamoTRIgine 100 milliGRAM(s) Oral daily  mirtazapine 30 milliGRAM(s) Oral at bedtime  NIFEdipine XL 60 milliGRAM(s) Oral daily  potassium chloride    Tablet ER 40 milliEquivalent(s) Oral every 4 hours  venlafaxine XR. 150 milliGRAM(s) Oral daily    MEDICATIONS  (PRN):

## 2021-10-20 NOTE — PROGRESS NOTE ADULT - ASSESSMENT
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 Cardiology consulted for SOB and chest pain    SOB   - ECHO LVEF 55-60%.LV diastolic function cannot determine due to atrial fibrillation.Mild mitral regurgitation is present.Mild aortic stenosis is  present.Mild tricuspid regurgitation is present . No evidence of any pulmonary hypertension  - CXR showed: mild pleural effusion   -ct chest, with small bilateral pleural effusions ,as delineated above fu pulm recs   - BNP: 08333 hypervolemic on exam with LE edema   -lasix increased to BID   - Daily weight,. Monitor Strict I/O's , negative 400 cc 24 hours    Chest pain (Atypical)  - Patient presented with chest discomfort, resolved at present  - EKG showed Afib, no ischemic changes noted  - mild troponin leak likely in setting of ARIELLA, peaked no need to further trend     Afib   - continue thromboembolism ppx with eliquis bid     Hypertensive urgency.   - On admission /90/ given hydralazine in ED 10 mg IV X2 doses  - recently admitted with hypertensive urgency on 10/2021  -now improved 149/69  - Continue clonidine 0.1 TID  - On hydralazine 100 mg q 8 hrs   - continue amlodipine 10 mg qd  - continue nifedipine XL  - continue  Imdur 30mg     CAD with stent placement   - continue with ASA  - continue statin      - All other medical needs as per primary team.  - Other cardiovascular workup will depend on clinical course.  - Will continue to follow.    Carmelina SALGUEROP-C  Cardiology NP  S[facility] [phone]

## 2021-10-20 NOTE — PROGRESS NOTE ADULT - PROBLEM SELECTOR PLAN 1
- Pt presented with SOB worse with laying down and with LE edema Since Sunday  - CxR: Bilateral lung parenchymal airspace opacities, increased from prior. Small pleural effusions suggested  - B/l LE Dopplers: No evidence of deep venous thrombosis in either lower extremity.  - BNP: 28189  - TTE on 10/13/21(previous admission): LVEF 55-60%. LV diastolic function cannot determine due to atrial fibrillation. -- Do not need to repeat at time AC on Chronic Diastolic CHF   - supplemental O2 as needed   - s/p 60mg IVP  - Will c/w 40mg IVP lasix daily, I/O,Weight   - am CT chest:   1. Small bilateral pleural effusions.  2. Multifocal bilateral pneumonia, organism nonspecific, does not have the characteristic appearance of COVID-19 but is within range of what can be seen with COVID-19 pneumonia.  - Monitor fluid status closely  - Cardio, Dr. Bliss's group, following.  - Pulm following- recommends monitoring off antibiotics at this time - Pt presented with SOB worse with laying down and with LE edema Since Sunday  - CxR: Bilateral lung parenchymal airspace opacities, increased from prior. Small pleural effusions suggested  - B/l LE Dopplers: No evidence of deep venous thrombosis in either lower extremity.  - BNP: 54767  - TTE on 10/13/21(previous admission): LVEF 55-60%. LV diastolic function cannot determine due to atrial fibrillation. -- Do not need to repeat at time AC on Chronic Diastolic CHF   - supplemental O2 as needed   - s/p 60mg IVP  - Will c/w 40mg IVP lasix BID, I/O,Weight   - am CT chest:   1. Small bilateral pleural effusions.  2. Multifocal bilateral pneumonia, organism nonspecific, does not have the characteristic appearance of COVID-19 but is within range of what can be seen with COVID-19 pneumonia.  - Monitor fluid status closely  - Cardio, Dr. Bliss's group, following.  - Pulm following- recommends monitoring off antibiotics at this time

## 2021-10-20 NOTE — PROGRESS NOTE ADULT - ASSESSMENT
CKD 4  Diabetes  Dyspnea, Fluid overload  Hypertension  h/o SLE  Hypokalemia  Anemia    Worsening creatinine trend with diuresis. Retacrit for anemia. On IV lasix bid. Monitor blood sugar levels. Insulin coverage as needed.   Monitor h/h trend. Potassium supplementation. Monitor BP trend. Titrate BP meds as needed. Salt restriction.   Will follow electrolytes and renal function trend. Avoid nephrotoxic meds as possible.

## 2021-10-21 ENCOUNTER — TRANSCRIPTION ENCOUNTER (OUTPATIENT)
Age: 76
End: 2021-10-21

## 2021-10-21 LAB
ALBUMIN SERPL ELPH-MCNC: 2.7 G/DL — LOW (ref 3.3–5)
ALP SERPL-CCNC: 49 U/L — SIGNIFICANT CHANGE UP (ref 40–120)
ALT FLD-CCNC: 21 U/L — SIGNIFICANT CHANGE UP (ref 12–78)
ANION GAP SERPL CALC-SCNC: 7 MMOL/L — SIGNIFICANT CHANGE UP (ref 5–17)
AST SERPL-CCNC: 12 U/L — LOW (ref 15–37)
BASOPHILS # BLD AUTO: 0.04 K/UL — SIGNIFICANT CHANGE UP (ref 0–0.2)
BASOPHILS NFR BLD AUTO: 0.7 % — SIGNIFICANT CHANGE UP (ref 0–2)
BILIRUB SERPL-MCNC: 0.2 MG/DL — SIGNIFICANT CHANGE UP (ref 0.2–1.2)
BUN SERPL-MCNC: 39 MG/DL — HIGH (ref 7–23)
CALCIUM SERPL-MCNC: 8.7 MG/DL — SIGNIFICANT CHANGE UP (ref 8.5–10.1)
CHLORIDE SERPL-SCNC: 108 MMOL/L — SIGNIFICANT CHANGE UP (ref 96–108)
CO2 SERPL-SCNC: 25 MMOL/L — SIGNIFICANT CHANGE UP (ref 22–31)
CREAT SERPL-MCNC: 2.6 MG/DL — HIGH (ref 0.5–1.3)
EOSINOPHIL # BLD AUTO: 0.2 K/UL — SIGNIFICANT CHANGE UP (ref 0–0.5)
EOSINOPHIL NFR BLD AUTO: 3.3 % — SIGNIFICANT CHANGE UP (ref 0–6)
GLUCOSE SERPL-MCNC: 112 MG/DL — HIGH (ref 70–99)
HCT VFR BLD CALC: 31 % — LOW (ref 39–50)
HGB BLD-MCNC: 9.5 G/DL — LOW (ref 13–17)
IMM GRANULOCYTES NFR BLD AUTO: 0.3 % — SIGNIFICANT CHANGE UP (ref 0–1.5)
LYMPHOCYTES # BLD AUTO: 1.03 K/UL — SIGNIFICANT CHANGE UP (ref 1–3.3)
LYMPHOCYTES # BLD AUTO: 16.9 % — SIGNIFICANT CHANGE UP (ref 13–44)
MCHC RBC-ENTMCNC: 26.1 PG — LOW (ref 27–34)
MCHC RBC-ENTMCNC: 30.6 GM/DL — LOW (ref 32–36)
MCV RBC AUTO: 85.2 FL — SIGNIFICANT CHANGE UP (ref 80–100)
MONOCYTES # BLD AUTO: 0.4 K/UL — SIGNIFICANT CHANGE UP (ref 0–0.9)
MONOCYTES NFR BLD AUTO: 6.6 % — SIGNIFICANT CHANGE UP (ref 2–14)
NEUTROPHILS # BLD AUTO: 4.41 K/UL — SIGNIFICANT CHANGE UP (ref 1.8–7.4)
NEUTROPHILS NFR BLD AUTO: 72.2 % — SIGNIFICANT CHANGE UP (ref 43–77)
NRBC # BLD: 0 /100 WBCS — SIGNIFICANT CHANGE UP (ref 0–0)
PLATELET # BLD AUTO: 329 K/UL — SIGNIFICANT CHANGE UP (ref 150–400)
POTASSIUM SERPL-MCNC: 3.5 MMOL/L — SIGNIFICANT CHANGE UP (ref 3.5–5.3)
POTASSIUM SERPL-SCNC: 3.5 MMOL/L — SIGNIFICANT CHANGE UP (ref 3.5–5.3)
PROT SERPL-MCNC: 6.9 G/DL — SIGNIFICANT CHANGE UP (ref 6–8.3)
RBC # BLD: 3.64 M/UL — LOW (ref 4.2–5.8)
RBC # FLD: 17.1 % — HIGH (ref 10.3–14.5)
SODIUM SERPL-SCNC: 140 MMOL/L — SIGNIFICANT CHANGE UP (ref 135–145)
WBC # BLD: 6.1 K/UL — SIGNIFICANT CHANGE UP (ref 3.8–10.5)
WBC # FLD AUTO: 6.1 K/UL — SIGNIFICANT CHANGE UP (ref 3.8–10.5)

## 2021-10-21 PROCEDURE — 99232 SBSQ HOSP IP/OBS MODERATE 35: CPT

## 2021-10-21 RX ORDER — FUROSEMIDE 40 MG
60 TABLET ORAL EVERY 12 HOURS
Refills: 0 | Status: DISCONTINUED | OUTPATIENT
Start: 2021-10-21 | End: 2021-10-22

## 2021-10-21 RX ORDER — POTASSIUM CHLORIDE 20 MEQ
40 PACKET (EA) ORAL ONCE
Refills: 0 | Status: COMPLETED | OUTPATIENT
Start: 2021-10-21 | End: 2021-10-21

## 2021-10-21 RX ADMIN — Medication 0.1 MILLIGRAM(S): at 22:18

## 2021-10-21 RX ADMIN — Medication 81 MILLIGRAM(S): at 11:22

## 2021-10-21 RX ADMIN — Medication 0.1 MILLIGRAM(S): at 13:45

## 2021-10-21 RX ADMIN — Medication 60 MILLIGRAM(S): at 16:49

## 2021-10-21 RX ADMIN — LAMOTRIGINE 100 MILLIGRAM(S): 25 TABLET, ORALLY DISINTEGRATING ORAL at 11:22

## 2021-10-21 RX ADMIN — Medication 1 MILLIGRAM(S): at 11:22

## 2021-10-21 RX ADMIN — ATORVASTATIN CALCIUM 10 MILLIGRAM(S): 80 TABLET, FILM COATED ORAL at 22:19

## 2021-10-21 RX ADMIN — APIXABAN 5 MILLIGRAM(S): 2.5 TABLET, FILM COATED ORAL at 16:49

## 2021-10-21 RX ADMIN — Medication 150 MILLIGRAM(S): at 11:22

## 2021-10-21 RX ADMIN — Medication 40 MILLIEQUIVALENT(S): at 16:56

## 2021-10-21 RX ADMIN — Medication 100 MILLIGRAM(S): at 22:19

## 2021-10-21 RX ADMIN — PREGABALIN 1000 MICROGRAM(S): 225 CAPSULE ORAL at 11:22

## 2021-10-21 RX ADMIN — AMLODIPINE BESYLATE 10 MILLIGRAM(S): 2.5 TABLET ORAL at 05:09

## 2021-10-21 RX ADMIN — Medication 60 MILLIGRAM(S): at 05:08

## 2021-10-21 RX ADMIN — BUDESONIDE AND FORMOTEROL FUMARATE DIHYDRATE 2 PUFF(S): 160; 4.5 AEROSOL RESPIRATORY (INHALATION) at 22:18

## 2021-10-21 RX ADMIN — MIRTAZAPINE 30 MILLIGRAM(S): 45 TABLET, ORALLY DISINTEGRATING ORAL at 22:19

## 2021-10-21 RX ADMIN — FAMOTIDINE 20 MILLIGRAM(S): 10 INJECTION INTRAVENOUS at 11:22

## 2021-10-21 RX ADMIN — APIXABAN 5 MILLIGRAM(S): 2.5 TABLET, FILM COATED ORAL at 05:08

## 2021-10-21 RX ADMIN — BUDESONIDE AND FORMOTEROL FUMARATE DIHYDRATE 2 PUFF(S): 160; 4.5 AEROSOL RESPIRATORY (INHALATION) at 05:09

## 2021-10-21 RX ADMIN — Medication 40 MILLIGRAM(S): at 05:08

## 2021-10-21 RX ADMIN — Medication 100 MILLIGRAM(S): at 05:08

## 2021-10-21 RX ADMIN — Medication 100 MILLIGRAM(S): at 13:45

## 2021-10-21 RX ADMIN — ISOSORBIDE MONONITRATE 30 MILLIGRAM(S): 60 TABLET, EXTENDED RELEASE ORAL at 11:22

## 2021-10-21 RX ADMIN — Medication 0.1 MILLIGRAM(S): at 05:09

## 2021-10-21 RX ADMIN — Medication 8 MILLIGRAM(S): at 22:18

## 2021-10-21 RX ADMIN — ARIPIPRAZOLE 30 MILLIGRAM(S): 15 TABLET ORAL at 11:22

## 2021-10-21 NOTE — DISCHARGE NOTE PROVIDER - NSDCFUADDINST_GEN_ALL_CORE_FT
Low Salt, Low Cholesterol, Diabetic diet with Fluid Restriction 1500 ml/24 Hrs   Follow up with Cardiologist in 1 week   Follow up with PMD in 1 week  Follow up with Hematology for anemia in 4 weeks.

## 2021-10-21 NOTE — PROGRESS NOTE ADULT - ASSESSMENT
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 Cardiology consulted for SOB and chest pain    Pneumonia  - CT chest showed small B/L pleural effusions and multifocal Pna s/p Abx.  No O2 requirement  - ECHO LVEF 55-60%. LV diastolic function cannot determine due to atrial fibrillation. Mild mitral regurgitation is present. Mild aortic stenosis is  present. Mild tricuspid regurgitation is present . No evidence of any pulmonary hypertension  - BNP: 62245.  Can switch IV Lasix to PO.  Net neg 1.7L  - Daily weight,. Monitor Strict I/O's     Chest pain (Atypical)  - Patient presented with chest discomfort.  Resolved  - EKG showed Afib, no ischemic changes noted  - Mild troponin leak likely in setting of ARIELLA, peaked no need to further trend     Afib  - Rate-controlled  - Continue Eliquis  - Monitor electrolytes, replete to keep K>4 and Mag>2    Hypertensive Urgency   - On admission /90/ given hydralazine in ED 10 mg IV X2 doses  - BP now mostly controlled at systolic 140's  - Continue clonidine 0.1 TID, Hydralazine 100 mg q 8 hrs, Amlodipine 10 mg qd, Nifedipine XL 60 mg, and Imdur 30mg   - Can increase Imdur as needed    CAD with stent placement   - No clinical evidence of ACS  - Continue with ASA  - Continue statin    - All other medical needs as per primary team.  - Other cardiovascular workup will depend on clinical course.  - Will continue to follow.    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 Cardiology consulted for SOB and chest pain    Pleural effusion  - CT chest showed small B/L pleural effusions and multifocal Pna s/p Abx.  No O2 requirement  - ECHO LVEF 55-60%. LV diastolic function cannot determine due to atrial fibrillation. Mild mitral regurgitation is present. Mild aortic stenosis is  present. Mild tricuspid regurgitation is present . No evidence of any pulmonary hypertension  - BNP: 83562.  cont lasix IV and reassess renae  Net neg 1.7L  - Daily weight,. Monitor Strict I/O's     Chest pain (Atypical)  - Patient presented with chest discomfort.  Resolved  - EKG showed Afib, no ischemic changes noted  - Mild troponin leak likely in setting of ARIELLA, peaked no need to further trend     Afib  - Rate-controlled  - Continue Eliquis  - Monitor electrolytes, replete to keep K>4 and Mag>2    Hypertensive Urgency   - On admission /90/ given hydralazine in ED 10 mg IV X2 doses  - BP now mostly controlled   - Continue clonidine 0.1 TID, Hydralazine 100 mg q 8 hrs, Amlodipine 10 mg qd, Nifedipine XL 60 mg, and Imdur 30mg   - Can increase Imdur to 60mg Qday    CAD with stent placement   - No clinical evidence of ACS  - Continue with ASA  - Continue statin    - All other medical needs as per primary team.  - Other cardiovascular workup will depend on clinical course.  - Will continue to follow.    Shilpa Kirkpatrick DNP, NP-C  Cardiology   Spectra #[phone]

## 2021-10-21 NOTE — DISCHARGE NOTE PROVIDER - CARE PROVIDER_API CALL
Rafat Rodarte  MEDICINE  [address], [address]  [place] [address]  Phone: [phone]  Fax: [phone]  Follow Up Time: 1 week    Erich Man)  Medicine  [address] [place] [address]  Phone: [phone]  Fax: [phone]  Follow Up Time: 1 week    Haseeb Gamboa)  Cardiology  [address], [address]  [place] [address]  Phone: [phone]  Fax: [phone]  Follow Up Time: 1 week   Rafat Rodarte  MEDICINE  [address], [address]  [place] [address]  Phone: [phone]  Fax: [phone]  Follow Up Time: 1 week    Erich Man)  Medicine  [address] [place] [address]  Phone: [phone]  Fax: [phone]  Follow Up Time: 1 week    Daryl Monterroso)  Cardiovascular Disease; Internal Medicine  [address] [place], [place] [id number]  Phone: [phone]  Fax: [phone]  Follow Up Time: 1 week    Isrrael Rodriguez  [facility]  [address], [address]  [place] [address]  Phone: [phone]  Fax: [phone]  Follow Up Time: 1 month

## 2021-10-21 NOTE — PROGRESS NOTE ADULT - PROBLEM SELECTOR PLAN 2
- Pt presented with /96 --- unclear if pt is compliant with his medications as he does not appear to have a good understanding of his medical conditions --- Psych, Dr. Winkler, consulted for assessment of capacity- ok to discharge from psych standpoint   - No vision changes, headache, ARIELLA, current CP, ECHO done.  - s/p Hydralazine 10mg IVP x2 SBP improved to 160's  - C/w with home antihypertensives:   -  Continue clonidine 0.1 TID  - On hydralazine 100 mg q 8 hrs   - continue amlodipine 10 mg qd  - continue nifedipine XL  - continue  Imdur 30mg   - Cardio, Dr. Bliss's group, following.

## 2021-10-21 NOTE — PROGRESS NOTE ADULT - PROBLEM SELECTOR PLAN 3
- Pt was recent admitted to [facility] from 10/7-10/16 and treated for PNA with IV Zosyn & Augmentin x 7 days   - Although CxR shows bilateral lung parenchymal airspace opacities, increased from prior pt does not have leukocytosis or fever and it is suspected SOB is more so 2/2 CHF exacerbation. CxR findings are suspected to be from previously treated PNA  - s/p vanc and zosyn in ED. will hold off on further Abx at this time.as per Pulmonary Dr Day   - F/u Cultures: pending results, Nl Lactate   - official S&S: dysphagis 3, soft solids   - Pulm, Dr. Day,- Keep Off Abx , glorialey Old PNA   Continue Symbicort 2x day.

## 2021-10-21 NOTE — PROGRESS NOTE ADULT - PROBLEM SELECTOR PLAN 6
- CKD 3-4- Cr stable   - baseline Cr appears to be 1.6 - 2.3  - Cr on admission 2.2; improved from 2.5 on 10/16  - Avoid nephrotoxic medications  - Trend BMP  - NephroDr. Rodarte consulted by ED. - CKD 3-4- Cr stable   - baseline Cr appears to be 1.6 - 2.3  - Cr on admission 2.2; improved from 2.5 on 10/16  - Avoid nephrotoxic medications  - Trend BMP  - Nephro, Dr. Rodarte following - Lasix 60mg PO BID

## 2021-10-21 NOTE — DISCHARGE NOTE PROVIDER - NSDCCPCAREPLAN_GEN_ALL_CORE_FT
PRINCIPAL DISCHARGE DIAGNOSIS  Diagnosis: Acute exacerbation of CHF (congestive heart failure)  Assessment and Plan of Treatment: You have chronic congestive heart failure.  Please follow up outpatient with a cardiologist and primary care physician and take your medications as advised.        SECONDARY DISCHARGE DIAGNOSES  Diagnosis: Hypertensive urgency  Assessment and Plan of Treatment: You were admitted to the hospital because you were found to have very High Blood Pressure --hypertensive urgency.  - Hypertensive urgency is when your blood pressure is severly elevated (systolic greater than 180, or diastolic greater than 110). Your blood pressure stabilized during the course of your hospital admission.   Please follow up with your primary care physician and take medications as advised.      Diagnosis: Atrial fibrillation  Assessment and Plan of Treatment: Chronic   HR stable       Diagnosis: Stage 3 chronic kidney disease  Assessment and Plan of Treatment: Your acute kidney injury (ARIELLA) was treated with appropriate rehydration, avoidance of medications that would injure the kidney, and with constant monitoring of blood tests that tell us about your kidneys.   -primary care doctor about the issue and follow up with the doctor within one week of discharge.  -Follow up with Nephrologist Dr Rodarte-for BMP in 1 week      Diagnosis: Type 2 diabetes mellitus  Assessment and Plan of Treatment: It is important that you continue all your medications when you leave the hospital and continue to follow up with your primary care doctor after leaving the hospital.      Diagnosis: HLD (hyperlipidemia)  Assessment and Plan of Treatment: Hyperlipidemia, or high cholesterol, is a condition where the levels of fat and/or cholesterol in your blood are high. It is important that when you leave the hospital you take all your medications as prescribed to control your blood cholesterol levels.       PRINCIPAL DISCHARGE DIAGNOSIS  Diagnosis: Acute exacerbation of CHF (congestive heart failure)  Assessment and Plan of Treatment: You have chronic congestive heart failure.  - CONTINUE to take Lasix 40mg daily  Please follow up outpatient with a cardiologist and primary care physician and take your medications as advised.        SECONDARY DISCHARGE DIAGNOSES  Diagnosis: Hypertensive urgency  Assessment and Plan of Treatment: You were admitted to the hospital because you were found to have very High Blood Pressure --hypertensive urgency.      Diagnosis: Atrial fibrillation  Assessment and Plan of Treatment: Chronic   HR stable       Diagnosis: Stage 3 chronic kidney disease  Assessment and Plan of Treatment: Your acute kidney injury (ARIELLA) was treated with appropriate rehydration, avoidance of medications that would injure the kidney, and with constant monitoring of blood tests that tell us about your kidneys.   -primary care doctor about the issue and follow up with the doctor within one week of discharge.  -Follow up with Nephrologist Dr Rodarte-for BMP in 1 week      Diagnosis: Type 2 diabetes mellitus  Assessment and Plan of Treatment: It is important that you continue all your medications when you leave the hospital and continue to follow up with your primary care doctor after leaving the hospital.      Diagnosis: HLD (hyperlipidemia)  Assessment and Plan of Treatment: Hyperlipidemia, or high cholesterol, is a condition where the levels of fat and/or cholesterol in your blood are high. It is important that when you leave the hospital you take all your medications as prescribed to control your blood cholesterol levels.       PRINCIPAL DISCHARGE DIAGNOSIS  Diagnosis: Acute exacerbation of CHF (congestive heart failure)  Assessment and Plan of Treatment: You have chronic congestive heart failure.  - CONTINUE to take Lasix 40mg daily  - CONTINUE to take Oxubutynin 5mg x 4 daily   - CONTINUE to take Pulmicort 2 puffs twice a day  Please follow up outpatient with a cardiologist and primary care physician and take your medications as advised.        SECONDARY DISCHARGE DIAGNOSES  Diagnosis: Hypertensive urgency  Assessment and Plan of Treatment: You were admitted to the hospital because you were found to have very High Blood Pressure --hypertensive urgency.  - CONTINUE to take Imdur 30mg daily   - CONTINUE to take Hydralazine 100mg orally every 8 hours  - CONTINUE to take Nifedipine 60mg once daily   - CONTINUE to take Amlodipine 10mg daily   - CONTINUE to take Cardura 8mg once daily  - CONTINUE to take Clonidine 0.1mg 3 times daily   Follow up with cardiology Dr. Gamboa in 1-2 weeks after discharge,      Diagnosis: Atrial fibrillation  Assessment and Plan of Treatment: - CONTINUE to take Eliquis 5mg orally 2 times daily    Diagnosis: Stage 3 chronic kidney disease  Assessment and Plan of Treatment: Your acute kidney injury (ARIELLA) was treated with appropriate rehydration, avoidance of medications that would injure the kidney, and with constant monitoring of blood tests that tell us about your kidneys.   - CONTINUE to take Lasix 40mg daily   -Follow up with Nephrologist Dr Rodarte-for EVELYN in 1 week      Diagnosis: Type 2 diabetes mellitus  Assessment and Plan of Treatment: It is important that you continue all your medications when you leave the hospital and continue to follow up with your primary care doctor after leaving the hospital.      Diagnosis: HLD (hyperlipidemia)  Assessment and Plan of Treatment: Hyperlipidemia, or high cholesterol, is a condition where the levels of fat and/or cholesterol in your blood are high. It is important that when you leave the hospital you take all your medications as prescribed to control your blood cholesterol levels.  - CONTINUE to take Atorvastatin 10mg daily at bedtime       Diagnosis: Depression with anxiety  Assessment and Plan of Treatment: - CONTINUE to take Lamictal 100mg daily   - CONTINUE to take Mirtazapine 30mg daily   - CONTINUE to take Venlafaxine 150mg daily   - CONTINUE to take Abilify 30mg daily    Diagnosis: GERD (gastroesophageal reflux disease)  Assessment and Plan of Treatment: - CONTINUE to take Famotidine 20mg every other day  - CONTINUE to take Pantoprazole 40mg once daily before a meal     PRINCIPAL DISCHARGE DIAGNOSIS  Diagnosis: Acute exacerbation of CHF (congestive heart failure)  Assessment and Plan of Treatment: You have chronic congestive heart failure. This was worsened due to your blood pressure being too high.  - CONTINUE to take Lasix 60mb twice a day   - CONTINUE to take Pulmicort 2 puffs twice a day  Please follow up outpatient with a cardiologist and primary care physician and take your medications as advised.        SECONDARY DISCHARGE DIAGNOSES  Diagnosis: Hypertensive urgency  Assessment and Plan of Treatment: You were admitted to the hospital because you were found to have very High Blood Pressure --hypertensive urgency.  - CONTINUE to take Imdur extended release 60mg daily   - CONTINUE to take Hydralazine 100mg orally every 8 hours  - CONTINUE to take Nifedipine 60mg once daily   - CONTINUE to take Amlodipine 10mg daily   - CONTINUE to take Cardura 8mg once daily  - CONTINUE to take Clonidine 0.2mg every 12 hours  Follow up with cardiology Dr. Gamboa in 1-2 weeks after discharge,      Diagnosis: Atrial fibrillation  Assessment and Plan of Treatment: - CONTINUE to take Eliquis 5mg orally 2 times daily    Diagnosis: Stage 3 chronic kidney disease  Assessment and Plan of Treatment: Your acute kidney injury (RAIELLA) was treated with appropriate rehydration, avoidance of medications that would injure the kidney, and with constant monitoring of blood tests that tell us about your kidneys.   -Follow up with Nephrologist Dr Rodarte-for EVELYN in 1 week      Diagnosis: Type 2 diabetes mellitus  Assessment and Plan of Treatment: It is important that you continue all your medications when you leave the hospital and continue to follow up with your primary care doctor after leaving the hospital.      Diagnosis: HLD (hyperlipidemia)  Assessment and Plan of Treatment: Hyperlipidemia, or high cholesterol, is a condition where the levels of fat and/or cholesterol in your blood are high. It is important that when you leave the hospital you take all your medications as prescribed to control your blood cholesterol levels.  - CONTINUE to take Atorvastatin 10mg daily at bedtime       Diagnosis: Depression with anxiety  Assessment and Plan of Treatment: - CONTINUE to take Lamictal 100mg daily   - CONTINUE to take Mirtazapine 30mg daily   - CONTINUE to take Venlafaxine 150mg daily   - CONTINUE to take Abilify 30mg daily    Diagnosis: GERD (gastroesophageal reflux disease)  Assessment and Plan of Treatment: - CONTINUE to take Famotidine 20mg every other day  - CONTINUE to take Pantoprazole 40mg once daily before a meal     PRINCIPAL DISCHARGE DIAGNOSIS  Diagnosis: Acute exacerbation of CHF (congestive heart failure)  Assessment and Plan of Treatment: You have chronic congestive heart failure. This was worsened due to your blood pressure being too high.  - CONTINUE to take Lasix 60mb twice a day   - CONTINUE to take Pulmicort 2 puffs twice a day  Please follow up outpatient with a cardiologist and primary care physician and take your medications as advised.        SECONDARY DISCHARGE DIAGNOSES  Diagnosis: Hypertensive urgency  Assessment and Plan of Treatment: You were admitted to the hospital because you were found to have very High Blood Pressure --hypertensive urgency.  - START to take Imdur extended release 90mg daily   - CONTINUE to take Hydralazine 100mg orally every 8 hours  - CONTINUE to take Nifedipine 60mg once daily   - CONTINUE to take Amlodipine 10mg daily   - CONTINUE to take Cardura 8mg once daily  - CONTINUE to take Clonidine 0.2mg every 12 hours  Follow up with cardiology Dr. Gamboa in 1-2 weeks after discharge,      Diagnosis: Atrial fibrillation  Assessment and Plan of Treatment: - CONTINUE to take Eliquis 5mg orally 2 times daily    Diagnosis: Stage 3 chronic kidney disease  Assessment and Plan of Treatment: Your acute kidney injury (ARIELLA) was treated with appropriate rehydration, avoidance of medications that would injure the kidney, and with constant monitoring of blood tests that tell us about your kidneys.   -Follow up with Nephrologist Dr Rodarte-for EVELYN in 1 week      Diagnosis: Type 2 diabetes mellitus  Assessment and Plan of Treatment: It is important that you continue all your medications when you leave the hospital and continue to follow up with your primary care doctor after leaving the hospital.      Diagnosis: HLD (hyperlipidemia)  Assessment and Plan of Treatment: Hyperlipidemia, or high cholesterol, is a condition where the levels of fat and/or cholesterol in your blood are high. It is important that when you leave the hospital you take all your medications as prescribed to control your blood cholesterol levels.  - CONTINUE to take Atorvastatin 10mg daily at bedtime       Diagnosis: Depression with anxiety  Assessment and Plan of Treatment: - CONTINUE to take Lamictal 100mg daily   - CONTINUE to take Mirtazapine 30mg daily   - CONTINUE to take Venlafaxine 150mg daily   - CONTINUE to take Abilify 30mg daily    Diagnosis: GERD (gastroesophageal reflux disease)  Assessment and Plan of Treatment: - CONTINUE to take Famotidine 20mg every other day  - CONTINUE to take Pantoprazole 40mg once daily before a meal     PRINCIPAL DISCHARGE DIAGNOSIS  Diagnosis: Acute exacerbation of CHF (congestive heart failure)  Assessment and Plan of Treatment: You have chronic Diastolic  congestive heart failure. This was worsened due to your blood pressure being very high.  -Lasix dose has been Increased  - CONTINUE to take TOTAL Lasix 60 mg  2x  a day ( 20 mg 3 tab 2x day)   -Start Potassium K Cl 20 Meq 1 tab daily with Morning Lasix  - CONTINUE to take Pulmicort 2 puffs twice a day  -Follow Fluid restriction 1500 ml/24 hrs , Do NOT drink extra Liquids/ Water ,  -Daily weight check at home   -PLEASE FOLLOW INSTRUCTION ON DISCHARGE DOCUMENTS for MEDICATION DIRECTION.  Please follow up outpatient with a cardiologist and primary care physician in 1 week and take your medications as advised.        SECONDARY DISCHARGE DIAGNOSES  Diagnosis: Hypertensive urgency  Assessment and Plan of Treatment: You were admitted to the hospital because you were found to have very High Blood Pressure --hypertensive urgency.  -Your Blood pressure medications are adjusted & Changed .  PLEASE FOLLOW INSTRUCTIONS On Discharge documents & Pharmacy   - START to take Imdur  90 mg daily ( 30mg 3 tab daily)  - CONTINUE to take Hydralazine 100 mg orally every 8 hours-3x day  - CONTINUE to take Amlodipine 10 mg daily   - CONTINUE to take Cardura 8 mg once daily  - CONTINUE to take Clonidine 0.2 mg every 12 hours-2x day   - CONTINUE to take Lasix 60 mg 2x day   ---STOP  Your  Nifedipine 60mg -STOP  Follow up with cardiology Dr. Gamboa in 1-2 weeks after discharge,      Diagnosis: Atrial fibrillation  Assessment and Plan of Treatment: - CONTINUE to take Eliquis 5mg orally 2 times daily  -Your HR is stable.    Diagnosis: Stage 3 chronic kidney disease  Assessment and Plan of Treatment: Your acute kidney injury (ARIELLA)  with CKD stage 3  injure the kidney,  -Continue Lasix 60 mg tab 2x day   -Follow up with Kidney Doc in 2 weeks for Repeat Blood test -BMP.   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      Diagnosis: GERD (gastroesophageal reflux disease)  Assessment and Plan of Treatment: - CONTINUE to take Famotidine 20mg every other day

## 2021-10-21 NOTE — DISCHARGE NOTE PROVIDER - PROVIDER TOKENS
PROVIDER:[TOKEN:[[id number]],FOLLOWUP:[1 week]],PROVIDER:[TOKEN:[[id number]],FOLLOWUP:[1 week]],PROVIDER:[TOKEN:[[id number]],FOLLOWUP:[1 week]] PROVIDER:[TOKEN:[[id number]],FOLLOWUP:[1 week]],PROVIDER:[TOKEN:[[id number]],FOLLOWUP:[1 week]],PROVIDER:[TOKEN:[[id number]],FOLLOWUP:[1 week]],PROVIDER:[TOKEN:[[id number]],FOLLOWUP:[1 month]]

## 2021-10-21 NOTE — PROGRESS NOTE ADULT - ASSESSMENT
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) living in a group home [facility]    vs noted  labs reviewed  on LASIX IV for CHF  MBS noted - Dysphagia diet -     completed ABX for PNA  ct chest from recent admission reviewed, cxr noted, repeat CT chest reviewed - multifocal pna reported - however - clinically does not correlate -   would monitor off ABX - biomarkers - cx - monitor VS and Sat  assess - eval for HF - vol overload - Diuresis - I and O - cardio eval - TTE reviewed  ELMER - does not tolerate CPAP  CKD - monitor renal indices - i and o - replete lytes  dysphagia diet - asp prec - HOB elev - oral hygiene  assist with needs - ADL  emotional support  tele monitor  old records reviewed

## 2021-10-21 NOTE — PROGRESS NOTE ADULT - ASSESSMENT
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 house admitted for CHF exacerbation and HTN urgency.

## 2021-10-21 NOTE — DISCHARGE NOTE PROVIDER - NSDCMRMEDTOKEN_GEN_ALL_CORE_FT
Adult Aspirin Regimen 81 mg oral delayed release tablet: 1 tab(s) orally once a day  amLODIPine 10 mg oral tablet: 1 tab(s) orally once a day  ARIPiprazole 30 mg oral tablet: 1 tab(s) orally once a day  atorvastatin 10 mg oral tablet: 1 tab(s) orally once a day   cloNIDine 0.1 mg oral tablet: 1 tab(s) orally 3 times a day  cyanocobalamin 1000 mcg oral tablet: 1 tab(s) orally once a day  doxazosin 8 mg oral tablet: 1 tab(s) orally once a day (at bedtime)  Eliquis 5 mg oral tablet: 1 tab(s) orally 2 times a day  folic acid 1 mg oral tablet: 1 tab(s) orally once a day  hydrALAZINE 100 mg oral tablet: 1 tab(s) orally every 8 hours  isosorbide mononitrate 30 mg oral tablet, extended release: 1 tab(s) orally once a day  LaMICtal 100 mg oral tablet: 1 tab(s) orally once a day  Lasix 40 mg oral tablet: 1 tab(s) orally once a day  mirtazapine 30 mg oral tablet: 1 tab(s) orally once a day (at bedtime)  NIFEdipine 60 mg oral tablet, extended release: 1 tab(s) orally once a day   Pepcid 20 mg oral tablet: 1 tab(s) orally every other day  Symbicort 160 mcg-4.5 mcg/inh inhalation aerosol: 2 puff(s) inhaled 2 times a day  venlafaxine 150 mg oral capsule, extended release: 1 cap(s) orally once a day   amLODIPine 10 mg oral tablet: 1 tab(s) orally once a day  apixaban 5 mg oral tablet: 1 tab(s) orally every 12 hours  ARIPiprazole 30 mg oral tablet: 1 tab(s) orally once a day  aspirin 81 mg oral delayed release tablet: 1 tab(s) orally once a day  atorvastatin 10 mg oral tablet: 1 tab(s) orally once a day (at bedtime)  budesonide-formoterol 160 mcg-4.5 mcg/inh inhalation aerosol:  inhaled   cloNIDine 0.2 mg oral tablet: 1 tab(s) orally every 12 hours  cyanocobalamin 1000 mcg oral tablet: 1 tab(s) orally once a day  doxazosin 8 mg oral tablet: 1 tab(s) orally once a day (at bedtime)  famotidine 20 mg oral tablet: 1 tab(s) orally once a day  folic acid 1 mg oral tablet: 1 tab(s) orally once a day  furosemide 20 mg oral tablet: 3 tab(s) orally 2 times a day  hydrALAZINE 100 mg oral tablet: 1 tab(s) orally 3 times a day  isosorbide mononitrate 60 mg oral tablet, extended release: 1 tab(s) orally once a day  lamoTRIgine 100 mg oral tablet: 1 tab(s) orally once a day  mirtazapine 30 mg oral tablet: 1 tab(s) orally once a day (at bedtime)  NIFEdipine 60 mg oral tablet, extended release: 1 tab(s) orally once a day   venlafaxine 150 mg oral capsule, extended release: 1 cap(s) orally once a day   amLODIPine 10 mg oral tablet: 1 tab(s) orally once a day  apixaban 5 mg oral tablet: 1 tab(s) orally every 12 hours  ARIPiprazole 30 mg oral tablet: 1 tab(s) orally once a day  aspirin 81 mg oral delayed release tablet: 1 tab(s) orally once a day  atorvastatin 10 mg oral tablet: 1 tab(s) orally once a day (at bedtime)  budesonide-formoterol 160 mcg-4.5 mcg/inh inhalation aerosol:  inhaled   cloNIDine 0.2 mg oral tablet: 1 tab(s) orally every 12 hours  cyanocobalamin 1000 mcg oral tablet: 1 tab(s) orally once a day  doxazosin 8 mg oral tablet: 1 tab(s) orally once a day (at bedtime)  famotidine 20 mg oral tablet: 1 tab(s) orally once a day  folic acid 1 mg oral tablet: 1 tab(s) orally once a day  furosemide 20 mg oral tablet: 3 tab(s) orally 2 times a day  guaiFENesin 600 mg oral tablet, extended release: 1 tab(s) orally every 12 hours, As needed, Cough  hydrALAZINE 100 mg oral tablet: 1 tab(s) orally 3 times a day  isosorbide mononitrate 60 mg oral tablet, extended release: 1 tab(s) orally once a day  lamoTRIgine 100 mg oral tablet: 1 tab(s) orally once a day  mirtazapine 30 mg oral tablet: 1 tab(s) orally once a day (at bedtime)  NIFEdipine 60 mg oral tablet, extended release: 1 tab(s) orally once a day   venlafaxine 150 mg oral capsule, extended release: 1 cap(s) orally once a day   amLODIPine 10 mg oral tablet: 1 tab(s) orally once a day  apixaban 5 mg oral tablet: 1 tab(s) orally every 12 hours  ARIPiprazole 30 mg oral tablet: 1 tab(s) orally once a day  aspirin 81 mg oral delayed release tablet: 1 tab(s) orally once a day  atorvastatin 10 mg oral tablet: 1 tab(s) orally once a day (at bedtime)  budesonide-formoterol 160 mcg-4.5 mcg/inh inhalation aerosol: 2 puff(s) inhaled 2 times a day   cloNIDine 0.2 mg oral tablet: 1 tab(s) orally every 12 hours  cyanocobalamin 1000 mcg oral tablet: 1 tab(s) orally once a day  doxazosin 8 mg oral tablet: 1 tab(s) orally once a day (at bedtime)  famotidine 20 mg oral tablet: 1 tab(s) orally once a day  folic acid 1 mg oral tablet: 1 tab(s) orally once a day  furosemide 20 mg oral tablet: 3 tab(s) orally 2 times a day  60 mg 1 tab 2x day  hydrALAZINE 100 mg oral tablet: 1 tab(s) orally 3 times a day  isosorbide mononitrate 60 mg oral tablet, extended release: 1 tab(s) orally once a day  lamoTRIgine 100 mg oral tablet: 1 tab(s) orally once a day  mirtazapine 30 mg oral tablet: 1 tab(s) orally once a day (at bedtime)  Potassium Chloride (Eqv-Klor-Con M20) 20 mEq oral tablet, extended release: 1 tab(s) orally once a day   Senna 8.6 mg oral tablet: 2 tab(s) orally once a day (at bedtime)  venlafaxine 150 mg oral capsule, extended release: 1 cap(s) orally once a day   amLODIPine 10 mg oral tablet: 1 tab(s) orally once a day  apixaban 5 mg oral tablet: 1 tab(s) orally every 12 hours  ARIPiprazole 30 mg oral tablet: 1 tab(s) orally once a day  aspirin 81 mg oral delayed release tablet: 1 tab(s) orally once a day  atorvastatin 10 mg oral tablet: 1 tab(s) orally once a day (at bedtime)  budesonide-formoterol 160 mcg-4.5 mcg/inh inhalation aerosol: 2 puff(s) inhaled 2 times a day   cloNIDine 0.2 mg oral tablet: 1 tab(s) orally every 12 hours  cyanocobalamin 1000 mcg oral tablet: 1 tab(s) orally once a day  doxazosin 8 mg oral tablet: 1 tab(s) orally once a day (at bedtime)  famotidine 20 mg oral tablet: 1 tab(s) orally once a day  folic acid 1 mg oral tablet: 1 tab(s) orally once a day  furosemide 20 mg oral tablet: 3 tab(s) orally 2 times a day  60 mg 1 tab 2x day  hydrALAZINE 100 mg oral tablet: 1 tab(s) orally 3 times a day  isosorbide mononitrate 30 mg oral tablet, extended release: 3 tab(s) orally once a day  Total 90 mg daily by mouth  lamoTRIgine 100 mg oral tablet: 1 tab(s) orally once a day  mirtazapine 30 mg oral tablet: 1 tab(s) orally once a day (at bedtime)  Potassium Chloride (Eqv-Klor-Con M20) 20 mEq oral tablet, extended release: 1 tab(s) orally once a day   Senna 8.6 mg oral tablet: 2 tab(s) orally once a day (at bedtime)  venlafaxine 150 mg oral capsule, extended release: 1 cap(s) orally once a day

## 2021-10-21 NOTE — PROGRESS NOTE ADULT - SUBJECTIVE AND OBJECTIVE BOX
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 Patient states his SOB started on Sunday and has been worse with laying down. Does admit to intermittent CP associated with the SOB and also when getting imaging as that makes him anxious. Does not appear to have a clear understanding of his medical conditions. Also, states he had some bleeding in his mouth on Sunday, but there is no longer bleeding; on exam there is a healing cut on his tongue and inner right cheek with no active bleeding likely from the pt biting his tongue. Admits difficulty laying down straight due to SOB. Denies fevers, chills, abd pain, n/v, sore throat, cough, palpitations  Of note, pt was recently admitted to [facility] on 10/7/21 for hypertensive urgency, Afib and  findings of PNA. was Rxed with IV Zosyn x 7 days  Abx .pt also got 1 u pRBC transfusion past week.    ED Course:   Vitals: BP: 210/96-->181/84, HR: 79, Temp: 98 F, RR: 17, SpO2: 100% on RA-->89% on RA-->98% on 2L NC   Labs: H/H: 9.6/30.6, aPTT: 43, PT/INR: 18.9/1.65, BUN/Cr: 28/2.2, alb: 3.1, GFR: 28, procal: .16, trop: .059, proBNP:10,846  Imaging:   CxR:  Bilateral lung parenchymal airspace opacities, increased from prior. Small pleural effusions suggested  B/l LE Dopplers: No evidence of deep venous thrombosis in either lower extremity.  Bilateral popliteal fossa fluid collections, likely Baker's cysts.  EKG: HR: 74, Atrial fibrillation, Incomplete right bundle branch block, Nonspecific T wave abnormality, Prolonged QT(461)  Received in the ED: Vanco and Zosyn x2, IV lasix 60mg, 10mg Hydralazine IVP x2 (18 Oct 2021 17:24)    INTERVAL HPI:  10/19/21: Patient was seen and examined at bedside. Denies any complaints. States his shortness of breath has improved.   10/20/21: Patient was seen and examined at bedside. States he was not able to sleep well because of another patient screaming across the room. Otherwise denies worsening SOB, chest pain, palpitations.  10/21/21: Patient was seen and examined at bedside. Patient comfortably having breakfast in bed, alert and awake. States he has no further complaints.       OVERNIGHT EVENTS:    Home Medications:  Lasix 40 mg oral tablet: 1 tab(s) orally once a day (18 Oct 2021 17:52)  Pepcid 20 mg oral tablet: 1 tab(s) orally every other day (18 Oct 2021 17:52)      MEDICATIONS  (STANDING):  amLODIPine   Tablet 10 milliGRAM(s) Oral daily  apixaban 5 milliGRAM(s) Oral every 12 hours  ARIPiprazole 30 milliGRAM(s) Oral daily  aspirin enteric coated 81 milliGRAM(s) Oral daily  atorvastatin 10 milliGRAM(s) Oral at bedtime  budesonide 160 MICROgram(s)/formoterol 4.5 MICROgram(s) Inhaler 2 Puff(s) Inhalation two times a day  cloNIDine 0.1 milliGRAM(s) Oral three times a day  cyanocobalamin 1000 MICROGram(s) Oral daily  doxazosin 8 milliGRAM(s) Oral at bedtime  famotidine    Tablet 20 milliGRAM(s) Oral daily  folic acid 1 milliGRAM(s) Oral daily  furosemide   Injectable 40 milliGRAM(s) IV Push every 12 hours  hydrALAZINE 100 milliGRAM(s) Oral three times a day  isosorbide   mononitrate ER Tablet (IMDUR) 30 milliGRAM(s) Oral daily  lamoTRIgine 100 milliGRAM(s) Oral daily  mirtazapine 30 milliGRAM(s) Oral at bedtime  NIFEdipine XL 60 milliGRAM(s) Oral daily  venlafaxine XR. 150 milliGRAM(s) Oral daily    MEDICATIONS  (PRN):      Allergies    No Known Allergies    Intolerances        Social History:  Tobacco: quit in 1980, not active smoker  EtOH: denies   Recreational drug use: cocaine several years ago "a few times" has not used in "many years"  Lives with: [facility]; group home; no nursing assistance; observation is there  Ambulates: independent, denies walker or cane but uses guard railings  ADLs: independent, but states need for assistance in bathing, cooking; independent with feeding, ambulating  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[place]  Vaccinations: Moderna x2 doses last dose in May (18 Oct 2021 17:24)      REVIEW OF SYSTEMS:  CONSTITUTIONAL: No fever, No chills, No fatigue, No myalgia, No Body ache, No Weakness  EYES: No eye pain,  No visual disturbances, No discharge, NO Redness  ENMT:  No ear pain, No nose bleed, No vertigo; No sinus pain, NO throat pain, No Congestion  NECK: No pain, No stiffness  RESPIRATORY: No cough, NO wheezing, No  hemoptysis, NO  shortness of breath  CARDIOVASCULAR: No chest pain, palpitations  GASTROINTESTINAL: No abdominal pain, NO epigastric pain. No nausea, No vomiting; No diarrhea, No constipation. [  ] BM  GENITOURINARY: No dysuria, No frequency, No urgency, No hematuria, NO incontinence  NEUROLOGICAL: No headaches, No dizziness, No numbness, No tingling, No tremors, No weakness  EXT: No Swelling, No Pain, No Edema  SKIN:  [ x ] No itching, burning, rashes, or lesions   MUSCULOSKELETAL: No joint pain ,No Jt swelling; No muscle pain, No back pain, No extremity pain  PSYCHIATRIC: No depression,  No anxiety,  No mood swings ,No difficulty sleeping at night  PAIN SCALE: [ x ] None  [  ] Other-  ROS Unable to obtain due to - [  ] Dementia  [  ] Lethargy [  ] Drowsy [  ] Sedated [  ] non verbal  REST OF REVIEW Of SYSTEM - [ x ] Normal     Vital Signs Last 24 Hrs  T(C): 36.4 (21 Oct 2021 05:06), Max: 37 (20 Oct 2021 17:46)  T(F): 97.6 (21 Oct 2021 05:06), Max: 98.6 (20 Oct 2021 17:46)  HR: 67 (21 Oct 2021 05:06) (56 - 67)  BP: 161/94 (21 Oct 2021 05:06) (132/67 - 161/94)  BP(mean): --  RR: 17 (21 Oct 2021 05:06) (17 - 18)  SpO2: 94% (21 Oct 2021 05:06) (92% - 94%)  Finger Stick        10-20 @ 07:01  -  10-21 @ 07:00  --------------------------------------------------------  IN: 480 mL / OUT: 2250 mL / NET: -1770 mL        PHYSICAL EXAM:  GENERAL:  [ x ] NAD , [  ] well appearing, [  ] Agitated, [  ] Drowsy,  [  ] Lethargy, [  ] confused   HEAD:  [ x ] Normal, [  ] Other  EYES:  [ x ] EOMI, [  ] PERRLA, [ x ] conjunctiva and sclera clear normal, [  ] Other,  [  ] Pallor,[  ] Discharge  ENMT:  [x  ] Normal, [  ] Moist mucous membranes, [  ] Good dentition, [  ] No Thrush  NECK:  [ x ] Supple, [ x ] No JVD, [  ] Normal thyroid, [  ] Lymphadenopathy [  ] Other  CHEST/LUNG:  [ x ] Clear to auscultation bilaterally, [ x ] Breath Sounds equal B/L  [  ] poor effort  [ x ] No rales, [ x ] No rhonchi  [ x ]  No wheezing,   HEART:  [ x ] Regular rate and rhythm, [  ] tachycardia, [  ] Bradycardia,  [  ] irregular  [ x ] No murmurs, No rubs, No gallops, [  ] PPM in place (Mfr:  )  ABDOMEN:  [ x ] Soft, [ x ] Nontender, [ x ] Nondistended, [  ] No mass, [  ] Bowel sounds present, [  ] obese  NERVOUS SYSTEM:  [ x ] Alert & Oriented X3, [  ] Nonfocal  [  ] Confusion  [  ] Encephalopathic [  ] Sedated [  ] Unable to assess, [  ] Dementia [  ] Other-  EXTREMITIES: [x  ] 2+ Peripheral Pulses, No clubbing, No cyanosis,  [  ] edema B/L lower EXT. [  ] PVD stasis skin changes B/L Lower EXT, [  ] wound  LYMPH: No lymphadenopathy noted  SKIN:  [x  ] No rashes or lesions, [  ] Pressure Ulcers, [  ] ecchymosis, [  ] Skin Tears, [  ] Other    DIET: Diet, Dysphagia 3 Soft-Thin Liquids:   Low Sodium (10-19-21 @ 09:33)      LABS:                        9.5    6.10  )-----------( 329      ( 21 Oct 2021 07:01 )             31.0     21 Oct 2021 07:01    140    |  108    |  39     ----------------------------<  112    3.5     |  25     |  2.60     Ca    8.7        21 Oct 2021 07:01    TPro  6.9    /  Alb  2.7    /  TBili  0.2    /  DBili  x      /  AST  12     /  ALT  21     /  AlkPhos  49     21 Oct 2021 07:01          Culture Results:   <10,000 CFU/mL Normal Urogenital Johnna (10-18 @ 18:35)  Culture Results:   No growth to date. (10-18 @ 16:04)  Culture Results:   No growth to date. (10-18 @ 16:04)          CARDIAC MARKERS ( 19 Oct 2021 02:22 )  .084 ng/mL / x     / x     / x     / x      CARDIAC MARKERS ( 18 Oct 2021 20:09 )  .086 ng/mL / x     / 90 U/L / x     / 1.7 ng/mL  CARDIAC MARKERS ( 18 Oct 2021 11:42 )  .059 ng/mL / x     / x     / x     / x              Culture - Urine (collected 18 Oct 2021 18:35)  Source: Clean Catch Clean Catch (Midstream)  Final Report (19 Oct 2021 14:17):    <10,000 CFU/mL Normal Urogenital Johnna    Culture - Blood (collected 18 Oct 2021 16:04)  Source: .Blood Blood-Peripheral  Preliminary Report (19 Oct 2021 17:02):    No growth to date.    Culture - Blood (collected 18 Oct 2021 16:04)  Source: .Blood Blood-Peripheral  Preliminary Report (19 Oct 2021 17:02):    No growth to date.         Anemia Panel:      Thyroid Panel:            Serum Pro-Brain Natriuretic Peptide: 55293 pg/mL (10-18-21 @ 11:42)      RADIOLOGY & ADDITIONAL TESTS:      HEALTH ISSUES - PROBLEM Dx:  Acute exacerbation of CHF (congestive heart failure)    Hypertensive urgency    Pneumonia    Elevated troponin    Atrial fibrillation  first documented on EKG 10/7/2021    Stage 3 chronic kidney disease    Anemia    Type 2 diabetes mellitus  not on home insulin    CAD (coronary artery disease)  s/p stents (not on plavix)    HLD (hyperlipidemia)    Depression with anxiety    DVT prophylaxis            Consultant(s) Notes Reviewed:  [ x ] YES     Care Discussed with [X] Consultants  [ x ] Patient  [  ] Family [  ] HCP [  ]   [  ] Social Service  [  ] RN, [  ] Physical Therapy,[  ] Palliative care team  DVT PPX: [  ] Lovenox, [  ] S C Heparin, [  ] Coumadin, [  ] Xarelto, [ x ] Eliquis, [  ] Pradaxa, [  ] IV Heparin drip, [  ] SCD [  ] Contraindication 2 to GI Bleed,[  ] Ambulation [  ] Contraindicated 2 to  bleed [  ] Contraindicated 2 to Brain Bleed  Advanced directive: [ x ] None, [  ] DNR/DNI Patient is a 76y old  Male who presents with a chief complaint of CHF exacerbation and HTN urgency (20 Oct 2021 10:22)    HPI:  77 yo M with PMHx of Type 2 DM (not on insulin), BPH, CAD s/p stents >4 years ago (not on plavix), diverticulitis (hospitalized 07/2020 at [facility]), GIB 2/2 diverticulosis (2020), anxiety, Lupus, HTN, HLD, CKD stage 3, HepC, 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 Patient states his SOB started on Sunday and has been worse with laying down. Does admit to intermittent CP associated with the SOB and also when getting imaging as that makes him anxious. Does not appear to have a clear understanding of his medical conditions. Also, states he had some bleeding in his mouth on Sunday, but there is no longer bleeding; on exam there is a healing cut on his tongue and inner right cheek with no active bleeding likely from the pt biting his tongue. Admits difficulty laying down straight due to SOB. Denies fevers, chills, abd pain, n/v, sore throat, cough, palpitations  Of note, pt was recently admitted to [facility] on 10/7/21 for hypertensive urgency, Afib and  findings of PNA. was Rxed with IV Zosyn x 7 days  Abx .pt also got 1 u pRBC transfusion past week.    ED Course:   Vitals: BP: 210/96-->181/84, HR: 79, Temp: 98 F, RR: 17, SpO2: 100% on RA-->89% on RA-->98% on 2L NC   Labs: H/H: 9.6/30.6, aPTT: 43, PT/INR: 18.9/1.65, BUN/Cr: 28/2.2, alb: 3.1, GFR: 28, procal: .16, trop: .059, proBNP:10,846  Imaging:   CxR:  Bilateral lung parenchymal airspace opacities, increased from prior. Small pleural effusions suggested  B/l LE Dopplers: No evidence of deep venous thrombosis in either lower extremity.  Bilateral popliteal fossa fluid collections, likely Baker's cysts.  EKG: HR: 74, Atrial fibrillation, Incomplete right bundle branch block, Nonspecific T wave abnormality, Prolonged QT(461)  Received in the ED: Vanco and Zosyn x2, IV lasix 60mg, 10mg Hydralazine IVP x2 (18 Oct 2021 17:24)    INTERVAL HPI:  10/19/21: Patient was seen and examined at bedside. Denies any complaints. States his shortness of breath has improved.   10/20/21: Patient was seen and examined at bedside. States he was not able to sleep well because of another patient screaming across the room. Otherwise denies worsening SOB, chest pain, palpitations.  10/21/21: Patient was seen and examined at bedside. Patient comfortably having breakfast in bed, alert and awake. States he has no further complaints. Stable Labs Low H/H . Cr stable.      OVERNIGHT EVENTS: NONE    Home Medications:  Lasix 40 mg oral tablet: 1 tab(s) orally once a day (18 Oct 2021 17:52)  Pepcid 20 mg oral tablet: 1 tab(s) orally every other day (18 Oct 2021 17:52)      MEDICATIONS  (STANDING):  amLODIPine   Tablet 10 milliGRAM(s) Oral daily  apixaban 5 milliGRAM(s) Oral every 12 hours  ARIPiprazole 30 milliGRAM(s) Oral daily  aspirin enteric coated 81 milliGRAM(s) Oral daily  atorvastatin 10 milliGRAM(s) Oral at bedtime  budesonide 160 MICROgram(s)/formoterol 4.5 MICROgram(s) Inhaler 2 Puff(s) Inhalation two times a day  cloNIDine 0.1 milliGRAM(s) Oral three times a day  cyanocobalamin 1000 MICROGram(s) Oral daily  doxazosin 8 milliGRAM(s) Oral at bedtime  famotidine    Tablet 20 milliGRAM(s) Oral daily  folic acid 1 milliGRAM(s) Oral daily  furosemide   Injectable 40 milliGRAM(s) IV Push every 12 hours  hydrALAZINE 100 milliGRAM(s) Oral three times a day  isosorbide   mononitrate ER Tablet (IMDUR) 30 milliGRAM(s) Oral daily  lamoTRIgine 100 milliGRAM(s) Oral daily  mirtazapine 30 milliGRAM(s) Oral at bedtime  NIFEdipine XL 60 milliGRAM(s) Oral daily  venlafaxine XR. 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 No nausea, No vomiting; No diarrhea, No constipation. [  ] BM  GENITOURINARY: No dysuria, No frequency, No urgency, No hematuria, NO incontinence  NEUROLOGICAL: No headaches, No dizziness, No numbness, No tingling, No tremors, No weakness  EXT: No Swelling, No Pain, No Edema  SKIN:  [ x ] No itching, burning, rashes, or lesions   MUSCULOSKELETAL: No joint pain ,No Jt swelling; No muscle pain, No back pain, No extremity pain  PSYCHIATRIC: No depression,  No anxiety,  No mood swings ,No difficulty sleeping at night  PAIN SCALE: [ x ] None  [  ] Other-  ROS Unable to obtain due to - [  ] Dementia  [  ] Lethargy [  ] Drowsy [  ] Sedated [  ] non verbal  REST OF REVIEW Of SYSTEM - [ x ] Normal     Vital Signs Last 24 Hrs  T(C): 36.4 (21 Oct 2021 05:06), Max: 37 (20 Oct 2021 17:46)  T(F): 97.6 (21 Oct 2021 05:06), Max: 98.6 (20 Oct 2021 17:46)  HR: 67 (21 Oct 2021 05:06) (56 - 67)  BP: 161/94 (21 Oct 2021 05:06) (132/67 - 161/94)  BP(mean): --  RR: 17 (21 Oct 2021 05:06) (17 - 18)  SpO2: 94% (21 Oct 2021 05:06) (92% - 94%)  Finger Stick        10-20 @ 07:01  -  10-21 @ 07:00  --------------------------------------------------------  IN: 480 mL / OUT: 2250 mL / NET: -1770 mL        PHYSICAL EXAM:  GENERAL:  [ x ] NAD , [ x ] well appearing, [  ] Agitated, [  ] Drowsy,  [  ] Lethargy, [  ] confused   HEAD:  [ x ] Normal, [  ] Other  EYES:  [ x ] EOMI, [  ] PERRLA, [ x ] conjunctiva and sclera clear normal, [  ] Other,  [  ] Pallor,[  ] Discharge  ENMT:  [x  ] Normal, [  ] Moist mucous membranes, [ x ] Good dentition, [ x ] No Thrush  NECK:  [ x ] Supple, [ x ] No JVD, [ x ] Normal thyroid, [  ] Lymphadenopathy [  ] Other  CHEST/LUNG:  [ x ] Clear to auscultation bilaterally, [ x ] Breath Sounds equal B/L  [  x] poor effort  [ x ] No rales, [ x ] No rhonchi  [ x ]  No wheezing,   HEART:  [ x ] Regular rate and rhythm, [  ] tachycardia, [  ] Bradycardia,  [  ] irregular  [ x ] No murmurs, No rubs, No gallops, [  ] PPM in place (Mfr:  )  ABDOMEN:  [ x ] Soft, [ x ] Nontender, [ x ] Nondistended, [x  ] No mass, [ x ] Bowel sounds present, [ x ] obese  NERVOUS SYSTEM:  [ x ] Alert & Oriented X 2, [ x ] Nonfocal  [  ] Confusion  [  ] Encephalopathic [  ] Sedated [  ] Unable to assess, [  ] Dementia [  ] Other-  EXTREMITIES: [x  ] 2+ Peripheral Pulses, No clubbing, No cyanosis,  [  ] edema B/L lower EXT. [  ] PVD stasis skin changes B/L Lower EXT, [  ] wound  LYMPH: No lymphadenopathy noted  SKIN:  [x  ] No rashes or lesions, [  ] Pressure Ulcers, [  ] ecchymosis, [  ] Skin Tears, [x  ] Other-Tattoos all 4 ext    DIET: Diet, Dysphagia 3 Soft-Thin Liquids:   Low Sodium (10-19-21 @ 09:33)      LABS:                        9.5    6.10  )-----------( 329      ( 21 Oct 2021 07:01 )             31.0     21 Oct 2021 07:01    140    |  108    |  39     ----------------------------<  112    3.5     |  25     |  2.60     Ca    8.7        21 Oct 2021 07:01    TPro  6.9    /  Alb  2.7    /  TBili  0.2    /  DBili  x      /  AST  12     /  ALT  21     /  AlkPhos  49     21 Oct 2021 07:01          Culture Results:   <10,000 CFU/mL Normal Urogenital Johnna (10-18 @ 18:35)  Culture Results:   No growth to date. (10-18 @ 16:04)  Culture Results:   No growth to date. (10-18 @ 16:04)          CARDIAC MARKERS ( 19 Oct 2021 02:22 )  .084 ng/mL / x     / x     / x     / x      CARDIAC MARKERS ( 18 Oct 2021 20:09 )  .086 ng/mL / x     / 90 U/L / x     / 1.7 ng/mL  CARDIAC MARKERS ( 18 Oct 2021 11:42 )  .059 ng/mL / x     / x     / x     / x              Culture - Urine (collected 18 Oct 2021 18:35)  Source: Clean Catch Clean Catch (Midstream)  Final Report (19 Oct 2021 14:17):    <10,000 CFU/mL Normal Urogenital Johnna    Culture - Blood (collected 18 Oct 2021 16:04)  Source: .Blood Blood-Peripheral  Preliminary Report (19 Oct 2021 17:02):    No growth to date.    Culture - Blood (collected 18 Oct 2021 16:04)  Source: .Blood Blood-Peripheral  Preliminary Report (19 Oct 2021 17:02):    No growth to date.         Anemia Panel:      Thyroid Panel:            Serum Pro-Brain Natriuretic Peptide: 84598 pg/mL (10-18-21 @ 11:42)      RADIOLOGY & ADDITIONAL TESTS:      HEALTH ISSUES - PROBLEM Dx:  Acute exacerbation of CHF (congestive heart failure)    Hypertensive urgency    Pneumonia    Elevated troponin    Atrial fibrillation  first documented on EKG 10/7/2021    Stage 3 chronic kidney disease    Anemia    Type 2 diabetes mellitus  not on home insulin    CAD (coronary artery disease)  s/p stents (not on plavix)    HLD (hyperlipidemia)    Depression with anxiety    DVT prophylaxis            Consultant(s) Notes Reviewed:  [ x ] YES     Care Discussed with [X] Consultants  [ x ] Patient  [  ] Family [  ] HCP [  ]   [  ] Social Service  [  ] RN, [  ] Physical Therapy,[  ] Palliative care team  DVT PPX: [  ] Lovenox, [  ] S C Heparin, [  ] Coumadin, [  ] Xarelto, [ x ] Eliquis, [  ] Pradaxa, [  ] IV Heparin drip, [  ] SCD [  ] Contraindication 2 to GI Bleed,[  ] Ambulation [  ] Contraindicated 2 to  bleed [  ] Contraindicated 2 to Brain Bleed  Advanced directive: [ x ] None, [  ] DNR/DNI

## 2021-10-21 NOTE — PROGRESS NOTE ADULT - SUBJECTIVE AND OBJECTIVE BOX
[facility] -- Fifi Bliss, Kalpesh Valdovinos, Abraham Sheridan Savella, [place]  Office # [phone]    Follow Up:  Hypertensive Urgency    Subjective/Observations: Awake and alert, comfortable on RA.  c/o cough but denies SOV, VALDEZ or orthopnea.  Denies chest pain or palpitations.  Denies any form of bleeding    REVIEW OF SYSTEMS: All other review of systems is negative unless indicated above  PAST MEDICAL & SURGICAL HISTORY:  HTN (hypertension)  c/b multiple episodes of hypertensive urgency    HLD (hyperlipidemia)    Atrial fibrillation  first documented on EKG 10/7/2021    CAD (coronary artery disease)  s/p stents (not on plavix)    Type 2 diabetes mellitus  not on home insulin/ Meds    Anxiety  Depression  multiple psych medications    History of diverticulitis  07/2021    Diverticulosis  c/b GIB in 2020    Afib  on AC    Stage 3 chronic kidney disease    Anemia of chronic disease  Monoclonal Gammopathy-MGUS  pRBC transfusion 10/15/21    Chronic diastolic congestive heart failure    Multiple thyroid nodules    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  Had surgery ( April 2013 )    S/P tonsillectomy    S/P arthroscopic knee surgery  Bilateral ( 2005 )    Torsion of testicle  Had surgery at age 14    Pilonidal cyst  Had surgery ( 1969 )    S/P cataract surgery  Bilateral    H/O hernia repair    MEDICATIONS  (STANDING):  amLODIPine   Tablet 10 milliGRAM(s) Oral daily  apixaban 5 milliGRAM(s) Oral every 12 hours  ARIPiprazole 30 milliGRAM(s) Oral daily  aspirin enteric coated 81 milliGRAM(s) Oral daily  atorvastatin 10 milliGRAM(s) Oral at bedtime  budesonide 160 MICROgram(s)/formoterol 4.5 MICROgram(s) Inhaler 2 Puff(s) Inhalation two times a day  cloNIDine 0.1 milliGRAM(s) Oral three times a day  cyanocobalamin 1000 MICROGram(s) Oral daily  doxazosin 8 milliGRAM(s) Oral at bedtime  famotidine    Tablet 20 milliGRAM(s) Oral daily  folic acid 1 milliGRAM(s) Oral daily  furosemide   Injectable 40 milliGRAM(s) IV Push every 12 hours  hydrALAZINE 100 milliGRAM(s) Oral three times a day  isosorbide   mononitrate ER Tablet (IMDUR) 30 milliGRAM(s) Oral daily  lamoTRIgine 100 milliGRAM(s) Oral daily  mirtazapine 30 milliGRAM(s) Oral at bedtime  NIFEdipine XL 60 milliGRAM(s) Oral daily  venlafaxine XR. 150 milliGRAM(s) Oral daily    MEDICATIONS  (PRN):    Allergies    No Known Allergies    Intolerances  Vital Signs Last 24 Hrs  T(C): 36.4 (21 Oct 2021 05:06), Max: 37 (20 Oct 2021 17:46)  T(F): 97.6 (21 Oct 2021 05:06), Max: 98.6 (20 Oct 2021 17:46)  HR: 67 (21 Oct 2021 05:06) (56 - 67)  BP: 161/94 (21 Oct 2021 05:06) (132/67 - 161/94)  BP(mean): --  RR: 17 (21 Oct 2021 05:06) (17 - 18)  SpO2: 94% (21 Oct 2021 05:06) (92% - 94%)  I&O's Summary    20 Oct 2021 07:01  -  21 Oct 2021 07:00  --------------------------------------------------------  IN: 480 mL / OUT: 2250 mL / NET: -1770 mL      PHYSICAL EXAM:  TELE: Afib  Constitutional: NAD, awake and alert, well-developed  HEENT: Moist Mucous Membranes, Anicteric  Pulmonary: Non-labored, breath sounds are clear but diminished bilaterally, No wheezing, rales or rhonchi  Cardiovascular: IRRR, S1 and S2, + murmurs, no rubs, gallops or clicks  Gastrointestinal: Bowel Sounds present, soft, nontender.   Lymph: Trace BLE edema. No lymphadenopathy.  Skin: No visible rashes or ulcers.  Psych:  Mood & affect appropriate  LABS: All Labs Reviewed:                        9.5    6.10  )-----------( 329      ( 21 Oct 2021 07:01 )             31.0                         8.0    7.38  )-----------( 295      ( 20 Oct 2021 08:47 )             26.4                         9.0    6.10  )-----------( 324      ( 19 Oct 2021 08:05 )             29.3     21 Oct 2021 07:01    140    |  108    |  39     ----------------------------<  112    3.5     |  25     |  2.60   20 Oct 2021 08:47    142    |  109    |  42     ----------------------------<  110    3.1     |  26     |  2.80   19 Oct 2021 08:05    142    |  110    |  34     ----------------------------<  136    3.4     |  25     |  2.60     Ca    8.7        21 Oct 2021 07:01  Ca    8.1        20 Oct 2021 08:47  Ca    8.9        19 Oct 2021 08:05  Phos  4.5       19 Oct 2021 08:05  Mg     2.3       19 Oct 2021 08:05  Mg     2.3       18 Oct 2021 11:42    TPro  6.9    /  Alb  2.7    /  TBili  0.2    /  DBili  x      /  AST  12     /  ALT  21     /  AlkPhos  49     21 Oct 2021 07:01  TPro  6.1    /  Alb  2.4    /  TBili  0.3    /  DBili  x      /  AST  13     /  ALT  19     /  AlkPhos  42     20 Oct 2021 08:47  TPro  7.1    /  Alb  2.8    /  TBili  0.3    /  DBili  x      /  AST  16     /  ALT  22     /  AlkPhos  45     19 Oct 2021 08:05       EXAM:  ECHO TTE WO CON COMP W DOPP         PROCEDURE DATE:  10/13/2021        INTERPRETATION:  Ordering Physician: SKYLA TERRY [id number]  Indication: Cardiac arrhythmias.  Technician: INDIGO  Study Quality: Technical difficult study.  A complete echocardiographic study was performed utilizing standard protocol including spectral and color Doppler in all echocardiographic windows.  Height: 172cm  Weight: 102kg  BSA: 2.1m2  Blood Pressure: 150/90  MEASUREMENTS  IVS: 1.3cm  PWT: 1.3cm  LA: 4.2cm  AO: 3.5cm  LVIDd: 5.4 cm.  LVIDs: 3.5cm  LVEF: 55-60%.  PASP: 34mm Hg  FINDINGS  Left Ventricle: Normal in  size and normal LV systolic function with estimated LVEF 55-60%.  Right Ventricle: Normal in size and normal RV systolic function.  Left Atrium: Mild dilated.  Right Atrium: Normal in size.  Mitral Valve: Mild mitral annular calcification is present. Mitral valve is mildly calcified and no evidence of any mitral stenosis. Mild mitral regurgitation is present.  Aortic Valve: Aortic root is mildsclerotic and of normal dimension. Aortic valve is mildly calcified and mild  aortic stenosis is present with peak gradient across aortic valve is 30 mmHg. Aortic valve area not calculated.  Tricuspid Valve: Mild tricuspid regurgitation with calculated PA systolic pressure 34 mmHg is present. No evidence of any pulmonary hypertension  Pulmonic Valve: Trace Pulmonary regurgitation is present.  Diastolic Function: LV diastolic function cannot determine due to  underlying atrial fibrillation.  Pericardium/Pleura: No evidence of any pericardial effusion.  No intracardiac mass  thrombus or vegetations and  tumor.  Mild concentric left ventricular hypertrophy is present.  IMPRESSION:  Normal LV size with normal LV systolic function with estimated LVEF 55-60%.    LV diastolic function cannot determine due to atrial fibrillation.    Mild mitral regurgitation is present.    Mild aortic stenosis is  present.    Mild tricuspid regurgitation is present . No evidence of any pulmonary hypertension    Correlate clinically above findings.    --- End of Report ---      SKYLA TERRY MD; Attending Cardiologist  This document has been electronically signed. Oct 13 2021 11:55PM    EXAM:  CT CHEST                          PROCEDURE DATE:  10/18/2021      INTERPRETATION:  PROCEDURE INFORMATION:  Exam: CT Chest Without Contrast; Diagnostic  Exam date and time: 10/18/2021 7:20 PM  Age: 76 years old  Clinical indication: Shortness of breath    TECHNIQUE:  Imaging protocol: Diagnostic computed tomography of the chest without contrast.    COMPARISON:  CT CHEST 10/6/2021    FINDINGS:  Lungs: Multifocal bilateral pneumonia, organism nonspecific, does not have the characteristic appearance of COVID-19 but is within range of what can be seen with COVID-19 pneumonia.  Pleural spaces: Small bilateral pleural effusions.  Heart: Cardiomegaly.  Aorta: Atherosclerotic calcifications of thoracic aorta and coronary arteries. No aortic aneurysm.  Lymph nodes: Unremarkable. No enlarged lymph nodes.    Bones/joints: Degenerative changes. No acute fracture.  Soft tissues: Unremarkable.  Upper abdomen: Right renal cysts. Nonspecific left adrenal thickening. Colonic diverticulosis.    IMPRESSION:  1. Small bilateral pleural effusions.  2. Multifocal bilateral pneumonia, organism nonspecific, does not have the characteristic appearance of COVID-19 but is within range of what can be seen with COVID-19 pneumonia.    --- End of Report ---    RAINA DAO MD; Attending Radiologist  This document has been electronically signed. Oct 19 2021  8:32AM    Ventricular Rate 74 BPM    Atrial Rate 70 BPM    QRS Duration 114 ms    Q-T Interval 416 ms    QTC Calculation(Bazett) 461 ms    R Axis -10 degrees    T Axis 52 degrees    Diagnosis Line Atrial fibrillation  Incomplete right bundle branch block  Moderate voltage criteria for LVH, may be normal variant  Nonspecific T wave abnormality  Prolonged QT  Abnormal ECG  Confirmed by Victorino Valdovinos MD ([id number]) on 10/18/2021 1:39:20 PM          [facility] -- Fifi Bliss, Kalpesh Valdovinos, Abraham Sheridan Savella, [place]  Office # [phone]    Follow Up:  Hypertensive Urgency    Subjective/Observations: Awake and alert, comfortable on RA.  c/o cough but denies SOB, VALDEZ or orthopnea.  Denies chest pain or palpitations.  Denies any form of bleeding    REVIEW OF SYSTEMS: All other review of systems is negative unless indicated above    PAST MEDICAL & SURGICAL HISTORY:  HTN (hypertension)  c/b multiple episodes of hypertensive urgency    HLD (hyperlipidemia)    Atrial fibrillation  first documented on EKG 10/7/2021    CAD (coronary artery disease)  s/p stents (not on plavix)    Type 2 diabetes mellitus  not on home insulin/ Meds    Anxiety  Depression  multiple psych medications    History of diverticulitis  07/2021    Diverticulosis  c/b GIB in 2020    Afib  on AC    Stage 3 chronic kidney disease    Anemia of chronic disease  Monoclonal Gammopathy-MGUS  pRBC transfusion 10/15/21    Chronic diastolic congestive heart failure    Multiple thyroid nodules    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  Had surgery ( April 2013 )    S/P tonsillectomy    S/P arthroscopic knee surgery  Bilateral ( 2005 )    Torsion of testicle  Had surgery at age 14    Pilonidal cyst  Had surgery ( 1969 )    S/P cataract surgery  Bilateral    H/O hernia repair    MEDICATIONS  (STANDING):  amLODIPine   Tablet 10 milliGRAM(s) Oral daily  apixaban 5 milliGRAM(s) Oral every 12 hours  ARIPiprazole 30 milliGRAM(s) Oral daily  aspirin enteric coated 81 milliGRAM(s) Oral daily  atorvastatin 10 milliGRAM(s) Oral at bedtime  budesonide 160 MICROgram(s)/formoterol 4.5 MICROgram(s) Inhaler 2 Puff(s) Inhalation two times a day  cloNIDine 0.1 milliGRAM(s) Oral three times a day  cyanocobalamin 1000 MICROGram(s) Oral daily  doxazosin 8 milliGRAM(s) Oral at bedtime  famotidine    Tablet 20 milliGRAM(s) Oral daily  folic acid 1 milliGRAM(s) Oral daily  furosemide   Injectable 40 milliGRAM(s) IV Push every 12 hours  hydrALAZINE 100 milliGRAM(s) Oral three times a day  isosorbide   mononitrate ER Tablet (IMDUR) 30 milliGRAM(s) Oral daily  lamoTRIgine 100 milliGRAM(s) Oral daily  mirtazapine 30 milliGRAM(s) Oral at bedtime  NIFEdipine XL 60 milliGRAM(s) Oral daily  venlafaxine XR. 150 milliGRAM(s) Oral daily    MEDICATIONS  (PRN):    Allergies    No Known Allergies    Intolerances  Vital Signs Last 24 Hrs  T(C): 36.4 (21 Oct 2021 05:06), Max: 37 (20 Oct 2021 17:46)  T(F): 97.6 (21 Oct 2021 05:06), Max: 98.6 (20 Oct 2021 17:46)  HR: 67 (21 Oct 2021 05:06) (56 - 67)  BP: 161/94 (21 Oct 2021 05:06) (132/67 - 161/94)  BP(mean): --  RR: 17 (21 Oct 2021 05:06) (17 - 18)  SpO2: 94% (21 Oct 2021 05:06) (92% - 94%)  I&O's Summary    20 Oct 2021 07:01  -  21 Oct 2021 07:00  --------------------------------------------------------  IN: 480 mL / OUT: 2250 mL / NET: -1770 mL      PHYSICAL EXAM:  TELE: Afib  Constitutional: NAD, awake and alert, well-developed  HEENT: Moist Mucous Membranes, Anicteric  Pulmonary: Decreased breath sounds b/l. No rales, crackles or wheeze appreciated.   Cardiovascular: IRRR, S1 and S2, + murmurs, no rubs, gallops or clicks  Gastrointestinal: Bowel Sounds present, soft, nontender.   Lymph: Trace BLE edema. No lymphadenopathy.  Skin: No visible rashes or ulcers.  Psych:  Mood & affect appropriate    LABS: All Labs Reviewed:                        9.5    6.10  )-----------( 329      ( 21 Oct 2021 07:01 )             31.0                         8.0    7.38  )-----------( 295      ( 20 Oct 2021 08:47 )             26.4                         9.0    6.10  )-----------( 324      ( 19 Oct 2021 08:05 )             29.3     21 Oct 2021 07:01    140    |  108    |  39     ----------------------------<  112    3.5     |  25     |  2.60   20 Oct 2021 08:47    142    |  109    |  42     ----------------------------<  110    3.1     |  26     |  2.80   19 Oct 2021 08:05    142    |  110    |  34     ----------------------------<  136    3.4     |  25     |  2.60     Ca    8.7        21 Oct 2021 07:01  Ca    8.1        20 Oct 2021 08:47  Ca    8.9        19 Oct 2021 08:05  Phos  4.5       19 Oct 2021 08:05  Mg     2.3       19 Oct 2021 08:05  Mg     2.3       18 Oct 2021 11:42    TPro  6.9    /  Alb  2.7    /  TBili  0.2    /  DBili  x      /  AST  12     /  ALT  21     /  AlkPhos  49     21 Oct 2021 07:01  TPro  6.1    /  Alb  2.4    /  TBili  0.3    /  DBili  x      /  AST  13     /  ALT  19     /  AlkPhos  42     20 Oct 2021 08:47  TPro  7.1    /  Alb  2.8    /  TBili  0.3    /  DBili  x      /  AST  16     /  ALT  22     /  AlkPhos  45     19 Oct 2021 08:05       EXAM:  ECHO TTE WO CON COMP W DOPP         PROCEDURE DATE:  10/13/2021        INTERPRETATION:  Ordering Physician: SKYLA TERRY [id number]  Indication: Cardiac arrhythmias.  Technician: INDIGO  Study Quality: Technical difficult study.  A complete echocardiographic study was performed utilizing standard protocol including spectral and color Doppler in all echocardiographic windows.  Height: 172cm  Weight: 102kg  BSA: 2.1m2  Blood Pressure: 150/90  MEASUREMENTS  IVS: 1.3cm  PWT: 1.3cm  LA: 4.2cm  AO: 3.5cm  LVIDd: 5.4 cm.  LVIDs: 3.5cm  LVEF: 55-60%.  PASP: 34mm Hg  FINDINGS  Left Ventricle: Normal in  size and normal LV systolic function with estimated LVEF 55-60%.  Right Ventricle: Normal in size and normal RV systolic function.  Left Atrium: Mild dilated.  Right Atrium: Normal in size.  Mitral Valve: Mild mitral annular calcification is present. Mitral valve is mildly calcified and no evidence of any mitral stenosis. Mild mitral regurgitation is present.  Aortic Valve: Aortic root is mildsclerotic and of normal dimension. Aortic valve is mildly calcified and mild  aortic stenosis is present with peak gradient across aortic valve is 30 mmHg. Aortic valve area not calculated.  Tricuspid Valve: Mild tricuspid regurgitation with calculated PA systolic pressure 34 mmHg is present. No evidence of any pulmonary hypertension  Pulmonic Valve: Trace Pulmonary regurgitation is present.  Diastolic Function: LV diastolic function cannot determine due to  underlying atrial fibrillation.  Pericardium/Pleura: No evidence of any pericardial effusion.  No intracardiac mass  thrombus or vegetations and  tumor.  Mild concentric left ventricular hypertrophy is present.  IMPRESSION:  Normal LV size with normal LV systolic function with estimated LVEF 55-60%.    LV diastolic function cannot determine due to atrial fibrillation.    Mild mitral regurgitation is present.    Mild aortic stenosis is  present.    Mild tricuspid regurgitation is present . No evidence of any pulmonary hypertension    Correlate clinically above findings.    --- End of Report ---      SKYLA TERRY MD; Attending Cardiologist  This document has been electronically signed. Oct 13 2021 11:55PM    EXAM:  CT CHEST                          PROCEDURE DATE:  10/18/2021      INTERPRETATION:  PROCEDURE INFORMATION:  Exam: CT Chest Without Contrast; Diagnostic  Exam date and time: 10/18/2021 7:20 PM  Age: 76 years old  Clinical indication: Shortness of breath    TECHNIQUE:  Imaging protocol: Diagnostic computed tomography of the chest without contrast.    COMPARISON:  CT CHEST 10/6/2021    FINDINGS:  Lungs: Multifocal bilateral pneumonia, organism nonspecific, does not have the characteristic appearance of COVID-19 but is within range of what can be seen with COVID-19 pneumonia.  Pleural spaces: Small bilateral pleural effusions.  Heart: Cardiomegaly.  Aorta: Atherosclerotic calcifications of thoracic aorta and coronary arteries. No aortic aneurysm.  Lymph nodes: Unremarkable. No enlarged lymph nodes.    Bones/joints: Degenerative changes. No acute fracture.  Soft tissues: Unremarkable.  Upper abdomen: Right renal cysts. Nonspecific left adrenal thickening. Colonic diverticulosis.    IMPRESSION:  1. Small bilateral pleural effusions.  2. Multifocal bilateral pneumonia, organism nonspecific, does not have the characteristic appearance of COVID-19 but is within range of what can be seen with COVID-19 pneumonia.    --- End of Report ---    RAINA DAO MD; Attending Radiologist  This document has been electronically signed. Oct 19 2021  8:32AM    Ventricular Rate 74 BPM    Atrial Rate 70 BPM    QRS Duration 114 ms    Q-T Interval 416 ms    QTC Calculation(Bazett) 461 ms    R Axis -10 degrees    T Axis 52 degrees    Diagnosis Line Atrial fibrillation  Incomplete right bundle branch block  Moderate voltage criteria for LVH, may be normal variant  Nonspecific T wave abnormality  Prolonged QT  Abnormal ECG  Confirmed by Bonifacio BARRERA, Victorino ([id number]) on 10/18/2021 1:39:20 PM

## 2021-10-21 NOTE — PROGRESS NOTE ADULT - ASSESSMENT
CKD 4  Diabetes  Dyspnea, Fluid overload  Hypertension  h/o SLE  Hypokalemia  Anemia    Stable renal indices. Change to PO lasix. Retacrit for anemia. Monitor blood sugar levels. Insulin coverage as needed.   Monitor h/h trend. Potassium supplementation. Monitor BP trend. Titrate BP meds as needed. Salt restriction.   Will follow electrolytes and renal function trend. Avoid nephrotoxic meds as possible. D/w patient regarding need for out patient nephrology follow up.

## 2021-10-21 NOTE — PROGRESS NOTE ADULT - PROBLEM SELECTOR PLAN 1
- Pt presented with SOB worse with laying down and with LE edema Since Sunday  - CxR: Bilateral lung parenchymal airspace opacities, increased from prior. Small pleural effusions suggested  - B/l LE Dopplers: No evidence of deep venous thrombosis in either lower extremity.  - BNP: 23360  - TTE on 10/13/21(previous admission): LVEF 55-60%. LV diastolic function cannot determine due to atrial fibrillation. -- Do not need to repeat at time AC on Chronic Diastolic CHF   - supplemental O2 as needed   - s/p 60mg IVP  - Will c/w 40mg IVP lasix BID, I/O,Weight   - am CT chest:   1. Small bilateral pleural effusions.  2. Multifocal bilateral pneumonia, organism nonspecific, does not have the characteristic appearance of COVID-19 but is within range of what can be seen with COVID-19 pneumonia.  - Monitor fluid status closely  - Cardio, Dr. Bliss's group, following.  - Pulm following- recommends monitoring off antibiotics at this time - Pt presented with SOB worse with laying down and with LE edema Since Sunday  - CxR: Bilateral lung parenchymal airspace opacities, increased from prior. Small pleural effusions suggested  - B/l LE Dopplers: No evidence of deep venous thrombosis in either lower extremity.  - BNP: 27928  - TTE on 10/13/21(previous admission): LVEF 55-60%. LV diastolic function cannot determine due to atrial fibrillation. -- Do not need to repeat at time AC on Chronic Diastolic CHF   - supplemental O2 as needed   - s/p 60mg IVP  - start Lasix 60mg PO BID  - am CT chest:   1. Small bilateral pleural effusions.  2. Multifocal bilateral pneumonia, organism nonspecific, does not have the characteristic appearance of COVID-19 but is within range of what can be seen with COVID-19 pneumonia.  - Monitor fluid status closely  - Cardio, Dr. Bliss's group, following.  - Pulm following- recommends monitoring off antibiotics at this time

## 2021-10-21 NOTE — PROGRESS NOTE ADULT - SUBJECTIVE AND OBJECTIVE BOX
Patient is a 76y old  Male who presents with a chief complaint of shortness of breath (19 Oct 2021 09:10)  Patient seen in follow up for CKD 4, fluid overload.        PAST MEDICAL HISTORY:  Hypertension    Diabetes mellitus    Lupus    Hepatitis C    Anxiety and depression    CAD (coronary artery disease)    Diverticulosis    Hyperlipidemia    HTN (hypertension)    HLD (hyperlipidemia)    Atrial fibrillation    CAD (coronary artery disease)    Type 2 diabetes mellitus    Anxiety    History of diverticulitis    Diverticulosis    Afib    Stage 3 chronic kidney disease    Anemia of chronic disease    Chronic diastolic congestive heart failure    Multiple thyroid nodules      MEDICATIONS  (STANDING):  amLODIPine   Tablet 10 milliGRAM(s) Oral daily  apixaban 5 milliGRAM(s) Oral every 12 hours  ARIPiprazole 30 milliGRAM(s) Oral daily  aspirin enteric coated 81 milliGRAM(s) Oral daily  atorvastatin 10 milliGRAM(s) Oral at bedtime  budesonide 160 MICROgram(s)/formoterol 4.5 MICROgram(s) Inhaler 2 Puff(s) Inhalation two times a day  cloNIDine 0.1 milliGRAM(s) Oral three times a day  cyanocobalamin 1000 MICROGram(s) Oral daily  doxazosin 8 milliGRAM(s) Oral at bedtime  famotidine    Tablet 20 milliGRAM(s) Oral daily  folic acid 1 milliGRAM(s) Oral daily  furosemide   Injectable 60 milliGRAM(s) IV Push every 12 hours  hydrALAZINE 100 milliGRAM(s) Oral three times a day  isosorbide   mononitrate ER Tablet (IMDUR) 30 milliGRAM(s) Oral daily  lamoTRIgine 100 milliGRAM(s) Oral daily  mirtazapine 30 milliGRAM(s) Oral at bedtime  NIFEdipine XL 60 milliGRAM(s) Oral daily  potassium chloride    Tablet ER 40 milliEquivalent(s) Oral once  venlafaxine XR. 150 milliGRAM(s) Oral daily    MEDICATIONS  (PRN):    T(C): 36.1 (10-21-21 @ 11:14), Max: 37 (10-20-21 @ 17:46)  HR: 69 (10-21-21 @ 11:14) (56 - 69)  BP: 144/71 (10-21-21 @ 11:14) (132/67 - 162/76)  RR: 18 (10-21-21 @ 11:14)  SpO2: 93% (10-21-21 @ 11:14)  Wt(kg): --  I&O's Detail    20 Oct 2021 07:01  -  21 Oct 2021 07:00  --------------------------------------------------------  IN:    Oral Fluid: 480 mL  Total IN: 480 mL    OUT:    Voided (mL): 2250 mL  Total OUT: 2250 mL    Total NET: -1770 mL      21 Oct 2021 07:01  -  21 Oct 2021 14:53  --------------------------------------------------------  IN:  Total IN: 0 mL    OUT:    Voided (mL): 300 mL  Total OUT: 300 mL    Total NET: -300 mL            PHYSICAL EXAM:  General: no distress  Respiratory: b/l air entry  Cardiovascular: S1 S2  Gastrointestinal: soft  Extremities: + edema             LABORATORY:                        9.5    6.10  )-----------( 329      ( 21 Oct 2021 07:01 )             31.0     10-21    140  |  108  |  39<H>  ----------------------------<  112<H>  3.5   |  25  |  2.60<H>    Ca    8.7      21 Oct 2021 07:01    TPro  6.9  /  Alb  2.7<L>  /  TBili  0.2  /  DBili  x   /  AST  12<L>  /  ALT  21  /  AlkPhos  49  10-21    Sodium, Serum: 140 mmol/L (10-21 @ 07:01)  Sodium, Serum: 142 mmol/L (10-20 @ 08:47)    Potassium, Serum: 3.5 mmol/L (10-21 @ 07:01)  Potassium, Serum: 3.1 mmol/L (10-20 @ 08:47)    Hemoglobin: 9.5 g/dL (10-21 @ 07:01)  Hemoglobin: 8.0 g/dL (10-20 @ 08:47)  Hemoglobin: 9.0 g/dL (10-19 @ 08:05)    Creatinine, Serum 2.60 (10-21 @ 07:01)  Creatinine, Serum 2.80 (10-20 @ 08:47)  Creatinine, Serum 2.60 (10-19 @ 08:05)        LIVER FUNCTIONS - ( 21 Oct 2021 07:01 )  Alb: 2.7 g/dL / Pro: 6.9 g/dL / ALK PHOS: 49 U/L / ALT: 21 U/L / AST: 12 U/L / GGT: x

## 2021-10-21 NOTE — PROGRESS NOTE ADULT - ATTENDING COMMENTS
still vol ol. cont iv lasix. Please continue to maintain strict I/Os, monitor daily weights, Cr, and K. likely transition to po soon. reeval daily. Further cardiac workup will depend on clinical course.

## 2021-10-21 NOTE — PROGRESS NOTE ADULT - ATTENDING COMMENTS
75 yo M with PMHx of Type 2 DM (not on insulin), BPH, CAD s/p stents >4 years ago (not on plavix),  Chronic Diastolic CHF ,Anemia -MGUS , diverticulitis (hospitalized 07/2020 at [facility]), GIB 2/2 diverticulosis (2020), anxiety/ depression , Lupus, HTN, HLD, CKD stage 3, HepC, hx of blood clots in brain (s/p surgery 2013) living in a group home [facility] presenting to the ED with SOB. Pt was already Rxed for PNA with IV Zosyn last week,  Admitted for ac on Chronic Diastolic  CHF exacerbation and HTN urgency.  Pt seen, Examined, case & care plan d/w pt, residents at detail.  -Concern for NON Compliance with meds /supervision at group home.  Consults -  Pulmonary-Dr Day follow up  Cardio-DR MYRNA Rosario follow up , continue same meds   -DR Rodarte D/W - OK to change to PO Lasix 60 mg q 12 hrs  Psych-DR Winkler d/w at detail, patient appears to have limited insight into medication   AM Labs.  PT Eval.---> NO PT need  Social service Eval. d/w at detail, start D/C Plan .  Total care time is 45 minutes.

## 2021-10-21 NOTE — PROGRESS NOTE ADULT - SUBJECTIVE AND OBJECTIVE BOX
Date/Time Patient Seen:  		  Referring MD:   Data Reviewed	       Patient is a 76y old  Male who presents with a chief complaint of CHF exacerbation and HTN urgency (20 Oct 2021 10:22)      Subjective/HPI     PAST MEDICAL & SURGICAL HISTORY:  Hypertension    Diabetes mellitus    Lupus    Hepatitis C    Anxiety and depression    CAD (coronary artery disease)  s/p stents    Diverticulosis    Hyperlipidemia    HTN (hypertension)  c/b multiple episodes of hypertensive urgency    HLD (hyperlipidemia)    Atrial fibrillation  first documented on EKG 10/7/2021    CAD (coronary artery disease)  s/p stents (not on plavix)    Type 2 diabetes mellitus  not on home insulin/ Meds    Anxiety  Depression  multiple psych medications    History of diverticulitis  07/2021    Diverticulosis  c/b GIB in 2020    Afib  on AC    Stage 3 chronic kidney disease    Anemia of chronic disease  Monoclonal Gammopathy-MGUS  pRBC transfusion 10/15/21    Chronic diastolic congestive heart failure    Multiple thyroid nodules    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  Had surgery ( April 2013 )    S/P tonsillectomy    S/P arthroscopic knee surgery  Bilateral ( 2005 )    Torsion of testicle  Had surgery at age 14    Pilonidal cyst  Had surgery ( 1969 )    S/P cataract surgery  Bilateral    H/O hernia repair          Medication list         MEDICATIONS  (STANDING):  amLODIPine   Tablet 10 milliGRAM(s) Oral daily  apixaban 5 milliGRAM(s) Oral every 12 hours  ARIPiprazole 30 milliGRAM(s) Oral daily  aspirin enteric coated 81 milliGRAM(s) Oral daily  atorvastatin 10 milliGRAM(s) Oral at bedtime  budesonide 160 MICROgram(s)/formoterol 4.5 MICROgram(s) Inhaler 2 Puff(s) Inhalation two times a day  cloNIDine 0.1 milliGRAM(s) Oral three times a day  cyanocobalamin 1000 MICROGram(s) Oral daily  doxazosin 8 milliGRAM(s) Oral at bedtime  famotidine    Tablet 20 milliGRAM(s) Oral daily  folic acid 1 milliGRAM(s) Oral daily  furosemide   Injectable 40 milliGRAM(s) IV Push every 12 hours  hydrALAZINE 100 milliGRAM(s) Oral three times a day  isosorbide   mononitrate ER Tablet (IMDUR) 30 milliGRAM(s) Oral daily  lamoTRIgine 100 milliGRAM(s) Oral daily  mirtazapine 30 milliGRAM(s) Oral at bedtime  NIFEdipine XL 60 milliGRAM(s) Oral daily  venlafaxine XR. 150 milliGRAM(s) Oral daily    MEDICATIONS  (PRN):         Vitals log        ICU Vital Signs Last 24 Hrs  T(C): 36.4 (21 Oct 2021 05:06), Max: 37 (20 Oct 2021 17:46)  T(F): 97.6 (21 Oct 2021 05:06), Max: 98.6 (20 Oct 2021 17:46)  HR: 67 (21 Oct 2021 05:06) (56 - 67)  BP: 161/94 (21 Oct 2021 05:06) (132/67 - 161/94)  BP(mean): --  ABP: --  ABP(mean): --  RR: 17 (21 Oct 2021 05:06) (17 - 18)  SpO2: 94% (21 Oct 2021 05:06) (92% - 94%)           Input and Output:  I&O's Detail    19 Oct 2021 07:01  -  20 Oct 2021 07:00  --------------------------------------------------------  IN:  Total IN: 0 mL    OUT:    Voided (mL): 850 mL  Total OUT: 850 mL    Total NET: -850 mL      20 Oct 2021 07:01  -  21 Oct 2021 05:44  --------------------------------------------------------  IN:    Oral Fluid: 480 mL  Total IN: 480 mL    OUT:    Voided (mL): 1800 mL  Total OUT: 1800 mL    Total NET: -1320 mL          Lab Data                        8.0    7.38  )-----------( 295      ( 20 Oct 2021 08:47 )             26.4     10-20    142  |  109<H>  |  42<H>  ----------------------------<  110<H>  3.1<L>   |  26  |  2.80<H>    Ca    8.1<L>      20 Oct 2021 08:47  Phos  4.5     10-19  Mg     2.3     10-19    TPro  6.1  /  Alb  2.4<L>  /  TBili  0.3  /  DBili  x   /  AST  13<L>  /  ALT  19  /  AlkPhos  42  10-20            Review of Systems	      Objective     Physical Examination    heart s1s2  lung dec BS  abd soft  head nc      Pertinent Lab findings & Imaging      Nikko:  NO   Adequate UO     I&O's Detail    19 Oct 2021 07:01  -  20 Oct 2021 07:00  --------------------------------------------------------  IN:  Total IN: 0 mL    OUT:    Voided (mL): 850 mL  Total OUT: 850 mL    Total NET: -850 mL      20 Oct 2021 07:01  -  21 Oct 2021 05:44  --------------------------------------------------------  IN:    Oral Fluid: 480 mL  Total IN: 480 mL    OUT:    Voided (mL): 1800 mL  Total OUT: 1800 mL    Total NET: -1320 mL               Discussed with:     Cultures:	        Radiology

## 2021-10-21 NOTE — DISCHARGE NOTE PROVIDER - HOSPITAL COURSE
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 Patient states his SOB started on Sunday and has been worse with laying down. Does admit to intermittent CP associated with the SOB and also when getting imaging as that makes him anxious. Does not appear to have a clear understanding of his medical conditions. Also, states he had some bleeding in his mouth on Sunday, but there is no longer bleeding; on exam there is a healing cut on his tongue and inner right cheek with no active bleeding likely from the pt biting his tongue. Admits difficulty laying down straight due to SOB. Denies fevers, chills, abd pain, n/v, sore throat, cough, palpitations  Of note, pt was recently admitted to [facility] on 10/7/21 for hypertensive urgency, Afib and  findings of PNA. was Rxed with IV Zosyn x 7 days  Abx .pt also got 1 u pRBC transfusion past week.    ED Course:   Vitals: BP: 210/96-->181/84, HR: 79, Temp: 98 F, RR: 17, SpO2: 100% on RA-->89% on RA-->98% on 2L NC   Labs: H/H: 9.6/30.6, aPTT: 43, PT/INR: 18.9/1.65, BUN/Cr: 28/2.2, alb: 3.1, GFR: 28, procal: .16, trop: .059, proBNP:10,846  Imaging:   CxR:  Bilateral lung parenchymal airspace opacities, increased from prior. Small pleural effusions suggested  B/l LE Dopplers: No evidence of deep venous thrombosis in either lower extremity.  Bilateral popliteal fossa fluid collections, likely Baker's cysts.  EKG: HR: 74, Atrial fibrillation, Incomplete right bundle branch block, Nonspecific T wave abnormality, Prolonged QT(461)  Received in the ED: Vanco and Zosyn x2, IV lasix 60mg, 10mg Hydralazine IVP x2 (18 Oct 2021 17:24)      ---  HOSPITAL COURSE:   Patient was admitted for hypertensive urgency and was found to have blood pressure of 210/96. Patient was given Clonidine 0.1 TID, hydralazine 100mg q8hrs, amlodipine 10mg, nifedipine, Imdur 30mg and was followed by [facility]. Patient's blood pressure stabilized and patient was clinically stable for discharge.   ---  CONSULTANTS:   Cardiology: [facility]   Pulmonology: Dr. Day  Nephrology: Dr. Rodarte  Psych: Dr. Winkler   ---  TIME SPENT:  I, the attending physician, was physically present for the key portions of the evaluation and management (E/M) service provided. 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 Patient states his SOB started on Sunday and has been worse with laying down. Does admit to intermittent CP associated with the SOB and also when getting imaging as that makes him anxious. Does not appear to have a clear understanding of his medical conditions. Also, states he had some bleeding in his mouth on Sunday, but there is no longer bleeding; on exam there is a healing cut on his tongue and inner right cheek with no active bleeding likely from the pt biting his tongue. Admits difficulty laying down straight due to SOB. Denies fevers, chills, abd pain, n/v, sore throat, cough, palpitations  Of note, pt was recently admitted to [facility] on 10/7/21 for hypertensive urgency, Afib and  findings of PNA. was Rxed with IV Zosyn x 7 days  Abx .pt also got 1 u pRBC transfusion past week.    ED Course:   Vitals: BP: 210/96-->181/84, HR: 79, Temp: 98 F, RR: 17, SpO2: 100% on RA-->89% on RA-->98% on 2L NC   Labs: H/H: 9.6/30.6, aPTT: 43, PT/INR: 18.9/1.65, BUN/Cr: 28/2.2, alb: 3.1, GFR: 28, procal: .16, trop: .059, proBNP:10,846  Imaging:   CxR:  Bilateral lung parenchymal airspace opacities, increased from prior. Small pleural effusions suggested  B/l LE Dopplers: No evidence of deep venous thrombosis in either lower extremity.  Bilateral popliteal fossa fluid collections, likely Baker's cysts.  EKG: HR: 74, Atrial fibrillation, Incomplete right bundle branch block, Nonspecific T wave abnormality, Prolonged QT(461)  Received in the ED: Vanco and Zosyn x2, IV lasix 60mg, 10mg Hydralazine IVP x2 (18 Oct 2021 17:24)      ---  HOSPITAL COURSE:   Patient was admitted for hypertensive urgency and CHF exacerbation. Patient received diuretics, blood pressure medications (amlodipine, clonidine, hydralazine). Patient's blood pressure and symptoms stabilized through medication optimization. This hospitalization likely occurred in the setting of poor medication adherence. Cardiology was consulted, pt was seen by Dr. Donaldson group.     Patient was given Clonidine 0.1 TID, hydralazine 100mg q8hrs, amlodipine 10mg, nifedipine, Imdur 30mg and was followed by [facility]. Patient's blood pressure stabilized and patient was clinically stable for discharge.   ---  CONSULTANTS:   Cardiology: [facility]   Pulmonology: Dr. Day  Nephrology: Dr. Rodarte  Psych: Dr. Winkler   ---  TIME SPENT:  I, the attending physician, was physically present for the key portions of the evaluation and management (E/M) service provided. 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 Patient states his SOB started on Sunday and has been worse with laying down. Does admit to intermittent CP associated with the SOB and also when getting imaging as that makes him anxious. Does not appear to have a clear understanding of his medical conditions. Also, states he had some bleeding in his mouth on Sunday, but there is no longer bleeding; on exam there is a healing cut on his tongue and inner right cheek with no active bleeding likely from the pt biting his tongue. Admits difficulty laying down straight due to SOB. Denies fevers, chills, abd pain, n/v, sore throat, cough, palpitations  Of note, pt was recently admitted to [facility] on 10/7/21 for hypertensive urgency, Afib and  findings of PNA. was Rxed with IV Zosyn x 7 days  Abx .pt also got 1 u pRBC transfusion past week.    ED Course:   Vitals: BP: 210/96-->181/84, HR: 79, Temp: 98 F, RR: 17, SpO2: 100% on RA-->89% on RA-->98% on 2L NC   Labs: H/H: 9.6/30.6, aPTT: 43, PT/INR: 18.9/1.65, BUN/Cr: 28/2.2, alb: 3.1, GFR: 28, procal: .16, trop: .059, proBNP:10,846  Imaging:   CxR:  Bilateral lung parenchymal airspace opacities, increased from prior. Small pleural effusions suggested  B/l LE Dopplers: No evidence of deep venous thrombosis in either lower extremity.  Bilateral popliteal fossa fluid collections, likely Baker's cysts.  EKG: HR: 74, Atrial fibrillation, Incomplete right bundle branch block, Nonspecific T wave abnormality, Prolonged QT(461)  Received in the ED: Vanco and Zosyn x2, IV lasix 60mg, 10mg Hydralazine IVP x2 (18 Oct 2021 17:24)      ---  HOSPITAL COURSE:   Patient was admitted for hypertensive urgency and CHF exacerbation. Patient received diuretics, blood pressure medications (amlodipine, clonidine, hydralazine). Patient's blood pressure and symptoms stabilized through medication optimization. This hospitalization likely occurred in the setting of poor medication adherence. Cardiology was consulted, pt was seen by Dr. Donaldson group.  He was evaluated by pulmonology for recent pneumonia and was monitored off antibiotic.     Patient was given Clonidine 0.1 TID, hydralazine 100mg q8hrs, amlodipine 10mg, nifedipine, Imdur 30mg and was followed by [facility]. Patient's blood pressure stabilized and patient was clinically stable for discharge.   ---  CONSULTANTS:   Cardiology: [facility]   Pulmonology: Dr. Day  Nephrology: Dr. Rodarte  Psych: Dr. Winkler   ---  TIME SPENT:  I, the attending physician, was physically present for the key portions of the evaluation and management (E/M) service provided. 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 Patient states his SOB started on Sunday and has been worse with laying down. Does admit to intermittent CP associated with the SOB and also when getting imaging as that makes him anxious. Does not appear to have a clear understanding of his medical conditions. Also, states he had some bleeding in his mouth on Sunday, but there is no longer bleeding; on exam there is a healing cut on his tongue and inner right cheek with no active bleeding likely from the pt biting his tongue. Admits difficulty laying down straight due to SOB. Denies fevers, chills, abd pain, n/v, sore throat, cough, palpitations  Of note, pt was recently admitted to [facility] on 10/7/21 for hypertensive urgency, Afib and  findings of PNA. was Rxed with IV Zosyn x 7 days  Abx .pt also got 1 u pRBC transfusion past week.    ED Course:   Vitals: BP: 210/96-->181/84, HR: 79, Temp: 98 F, RR: 17, SpO2: 100% on RA-->89% on RA-->98% on 2L NC   Labs: H/H: 9.6/30.6, aPTT: 43, PT/INR: 18.9/1.65, BUN/Cr: 28/2.2, alb: 3.1, GFR: 28, procal: .16, trop: .059, proBNP:10,846  Imaging:   CxR:  Bilateral lung parenchymal airspace opacities, increased from prior. Small pleural effusions suggested  B/l LE Dopplers: No evidence of deep venous thrombosis in either lower extremity.  Bilateral popliteal fossa fluid collections, likely Baker's cysts.  EKG: HR: 74, Atrial fibrillation, Incomplete right bundle branch block, Nonspecific T wave abnormality, Prolonged QT(461)  Received in the ED: Vanco and Zosyn x2, IV lasix 60mg, 10mg Hydralazine IVP x2 (18 Oct 2021 17:24)      ---  HOSPITAL COURSE:   Patient was admitted for hypertensive urgency and CHF exacerbation. Patient received diuretics, blood pressure medications (amlodipine, clonidine, hydralazine). Patient's blood pressure and symptoms stabilized through medication optimization. This hospitalization likely occurred in the setting of poor medication adherence. Cardiology was consulted, pt was seen by Dr. Bliss's group.  He was evaluated by pulmonology for recent pneumonia and was monitored off antibiotics. Troponins were elevated likely 2/2 demand ischemia, which resolved. Patient was monitored on telemetry for afib. Nephrotoxic medications were avoided and nephrology was consulted for CKD 3-4. Patient was monitored for chronic conditions of anemia, T2DM, CAD, hyperlipidemia, depression, and anxiety. Patient's blood pressure stabilized and patient was clinically stable for discharge.     Physical exam on day of discharge:  T(C): 36.4 (10-25-21 @ 13:07), Max: 36.6 (10-24-21 @ 19:53)  HR: 66 (10-25-21 @ 13:07) (54 - 66)  BP: 137/66 (10-25-21 @ 13:07) (137/66 - 163/78)  RR: 18 (10-25-21 @ 13:07) (18 - 18)  SpO2: 95% (10-25-21 @ 13:07) (94% - 95%)    GENERAL:  [ x ] NAD , [ x ] well appearing, [  ] Agitated, [  ] Drowsy,  [  ] Lethargy, [  ] confused   HEAD:  [x  ] Normal, [  ] Other  EYES:  [ x ] EOMI, [ x ] PERRLA, [ x ] conjunctiva and sclera clear normal, [  ] Other,  [  ] Pallor,[  ] Discharge  ENMT:  [ x ] Normal, [ x ] Moist mucous membranes, [ x ] Good dentition, [ x ] No Thrush  NECK:  [ x ] Supple, [ x ] No JVD, [ x ] Normal thyroid, [  ] Lymphadenopathy [  ] Other  CHEST/LUNG:  [ x ] Clear to auscultation bilaterally, [ x ] Breath Sounds equal B/L  [  ] poor effort  [ x ] No rales, [ x ] No rhonchi  [ x ]  No wheezing,   HEART:  [ x ] Regular rate and rhythm, [  ] tachycardia, [  ] Bradycardia,  [  ] irregular  [ x ] No murmurs, No rubs, No gallops, [  ] PPM in place (Mfr:  )  ABDOMEN:  [ x ] Soft, [x] nontender [ x] Nondistended, [x  ] No mass, [ ] Bowel sounds present, [x] obese  NERVOUS SYSTEM:  [x  ] Alert & Oriented X3, [ x ] Nonfocal  [  ] Confusion  [  ] Encephalopathic [  ] Sedated [  ] Unable to assess, [  ] Dementia [  ] Other-  EXTREMITIES: [  ] 2+ Peripheral Pulses, No clubbing, No cyanosis,  [x  ] minimal edema  B/L lower EXT. [  ] PVD stasis skin changes B/L Lower EXT, [  ] wound  LYMPH: No lymphadenopathy noted  SKIN:  [ x ] No rashes or lesions, [  ] Pressure Ulcers, [  ] ecchymosis, [  ] Skin Tears, [ x ] Other    ---  CONSULTANTS:   Cardiology: [facility]   Pulmonology: Dr. Day  Nephrology: Dr. Rodarte  Psych: Dr. Winkler   ---  TIME SPENT:  I, the attending physician, was physically present for the key portions of the evaluation and management (E/M) service provided. 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 Patient states his SOB started on Sunday and has been worse with laying down. Does admit to intermittent CP associated with the SOB and also when getting imaging as that makes him anxious. Does not appear to have a clear understanding of his medical conditions. Also, states he had some bleeding in his mouth on Sunday, but there is no longer bleeding; on exam there is a healing cut on his tongue and inner right cheek with no active bleeding likely from the pt biting his tongue. Admits difficulty laying down straight due to SOB. Denies fevers, chills, abd pain, n/v, sore throat, cough, palpitations  Of note, pt was recently admitted to [facility] on 10/7/21 for hypertensive urgency, Afib and  findings of PNA. was Rxed with IV Zosyn x 7 days  Abx .pt also got 1 u pRBC transfusion past week.    ED Course:   Vitals: BP: 210/96-->181/84, HR: 79, Temp: 98 F, RR: 17, SpO2: 100% on RA-->89% on RA-->98% on 2L NC   Labs: H/H: 9.6/30.6, aPTT: 43, PT/INR: 18.9/1.65, BUN/Cr: 28/2.2, alb: 3.1, GFR: 28, procal: .16, trop: .059, proBNP:10,846  Imaging:   CxR:  Bilateral lung parenchymal airspace opacities, increased from prior. Small pleural effusions suggested  B/l LE Dopplers: No evidence of deep venous thrombosis in either lower extremity.  Bilateral popliteal fossa fluid collections, likely Baker's cysts.  EKG: HR: 74, Atrial fibrillation, Incomplete right bundle branch block, Nonspecific T wave abnormality, Prolonged QT(461)  Received in the ED: Vanco and Zosyn x2, IV lasix 60mg, 10mg Hydralazine IVP x2 (18 Oct 2021 17:24)      ---  HOSPITAL COURSE:   Patient was admitted for hypertensive urgency and CHF exacerbation. Patient received diuretics, blood pressure medications (amlodipine, clonidine, hydralazine). Patient's blood pressure and symptoms stabilized through medication optimization. This hospitalization likely occurred in the setting of poor medication adherence. Cardiology was consulted, pt was seen by Dr. Bliss's group.  He was evaluated by pulmonology for recent pneumonia and was monitored off antibiotics. Troponins were elevated likely 2/2 demand ischemia, which resolved. Patient was monitored on telemetry for afib. Nephrotoxic medications were avoided and nephrology was consulted for CKD 3-4. Patient was monitored for chronic conditions of anemia, T2DM, CAD, hyperlipidemia, depression, and anxiety. Patient's blood pressure stabilized and patient was clinically stable for discharge.     Physical exam on day of discharge:  Vital Signs Last 24 Hrs  T(C): 36.4 (27 Oct 2021 05:01), Max: 36.6 (26 Oct 2021 20:06)  T(F): 97.6 (27 Oct 2021 05:01), Max: 97.9 (26 Oct 2021 20:06)  HR: 65 (27 Oct 2021 05:01) (65 - 66)  BP: 177/88 (27 Oct 2021 05:01) (144/85 - 183/87)  BP(mean): --  RR: 17 (27 Oct 2021 05:01) (17 - 18)  SpO2: 94% (27 Oct 2021 05:01) (94% - 96%)    GENERAL:  [ x ] NAD , [ x ] well appearing, [  ] Agitated, [  ] Drowsy,  [  ] Lethargy, [  ] confused   HEAD:  [x  ] Normal, [  ] Other  EYES:  [ x ] EOMI, [ x ] PERRLA, [ x ] conjunctiva and sclera clear normal, [  ] Other,  [  ] Pallor,[  ] Discharge  ENMT:  [ x ] Normal, [ x ] Moist mucous membranes, [ x ] Good dentition, [ x ] No Thrush  NECK:  [ x ] Supple, [ x ] No JVD, [ x ] Normal thyroid, [  ] Lymphadenopathy [  ] Other  CHEST/LUNG:  [ x ] Clear to auscultation bilaterally, [ x ] Breath Sounds equal B/L  [  ] poor effort  [ x ] No rales, [ x ] No rhonchi  [ x ]  No wheezing,   HEART:  [ x ] Regular rate and rhythm, [  ] tachycardia, [  ] Bradycardia,  [  ] irregular  [ x ] No murmurs, No rubs, No gallops, [  ] PPM in place (Mfr:  )  ABDOMEN:  [ x ] Soft, [x] nontender [ x] Nondistended, [x  ] No mass, [ ] Bowel sounds present, [x] obese  NERVOUS SYSTEM:  [x  ] Alert & Oriented X3, [ x ] Nonfocal  [  ] Confusion  [  ] Encephalopathic [  ] Sedated [  ] Unable to assess, [  ] Dementia [  ] Other-  EXTREMITIES: [  ] 2+ Peripheral Pulses, No clubbing, No cyanosis,  [x  ] minimal edema  B/L lower EXT. [  ] PVD stasis skin changes B/L Lower EXT, [  ] wound  LYMPH: No lymphadenopathy noted  SKIN:  [ x ] No rashes or lesions, [  ] Pressure Ulcers, [  ] ecchymosis, [  ] Skin Tears, [ x ] Other    ---  CONSULTANTS:   Cardiology: [facility]   Pulmonology: Dr. Day  Nephrology: Dr. Rodarte  Psych: Dr. Winkler   ---  TIME SPENT:  I, the attending physician, was physically present for the key portions of the evaluation and management (E/M) service provided. 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 house presenting to the ED with SOB. Patient states his SOB started on Sunday and has been worse with laying down. Does admit to intermittent CP associated with the SOB and also when getting imaging as that makes him anxious. Does not appear to have a clear understanding of his medical conditions. Also, states he had some bleeding in his mouth on Sunday, but there is no longer bleeding; on exam there is a healing cut on his tongue and inner right cheek with no active bleeding likely from the pt biting his tongue. Admits difficulty laying down straight due to SOB. Denies fevers, chills, abd pain, n/v, sore throat, cough, palpitations  Of note, pt was recently admitted to \[facility]\"" on 10/7/21 for hypertensive urgency, Afib and  findings of PNA. was Rxed with IV Zosyn x 7 days  Abx .pt also got 1 u pRBC transfusion past week.    ED Course:   Vitals: BP: 210/96-->181/84, HR: 79, Temp: 98 F, RR: 17, SpO2: 100% on RA-->89% on RA-->98% on 2L NC   Labs: H/H: 9.6/30.6, aPTT: 43, PT/INR: 18.9/1.65, BUN/Cr: 28/2.2, alb: 3.1, GFR: 28, procal: .16, trop: .059, proBNP:10,846  Imaging:   CxR:  Bilateral lung parenchymal airspace opacities, increased from prior. Small pleural effusions suggested  B/l LE Dopplers: No evidence of deep venous thrombosis in either lower extremity.  Bilateral popliteal fossa fluid collections, likely Baker's cysts.  EKG: HR: 74, Atrial fibrillation, Incomplete right bundle branch block, Nonspecific T wave abnormality, Prolonged QT(461)  Received in the ED: Vanco and Zosyn x2, IV lasix 60mg, 10mg Hydralazine IVP x2 (18 Oct 2021 17:24)      ---  HOSPITAL COURSE:   Patient was admitted for hypertensive urgency and CHF exacerbation. Patient received diuretics, blood pressure medications (amlodipine, clonidine, hydralazine). Patient's blood pressure and symptoms stabilized through medication optimization. This hospitalization likely occurred in the setting of poor medication adherence. Cardiology was consulted, pt was seen by Dr. Bliss's group.  He was evaluated by pulmonology for recent pneumonia and was monitored off antibiotics. Troponins were elevated likely 2/2 demand ischemia, which resolved. Patient was monitored on telemetry for afib. Nephrotoxic medications were avoided and nephrology was consulted for CKD 3-4. Patient was monitored for chronic conditions of anemia, T2DM, CAD, hyperlipidemia, depression, and anxiety. Patient's blood pressure stabilized and patient was clinically stable for discharge.     Physical exam on day of discharge:  Vital Signs Last 24 Hrs  T(C): 36.5 (28 Oct 2021 11:14), Max: 36.7 (27 Oct 2021 20:07)  T(F): 97.7 (28 Oct 2021 11:14), Max: 98 (27 Oct 2021 20:07)  HR: 63 (28 Oct 2021 11:14) (63 - 67)  BP: 116/70 (28 Oct 2021 11:14) (116/70 - 191/88)  BP(mean): --  RR: 18 (28 Oct 2021 11:14) (17 - 19)  SpO2: 95% (28 Oct 2021 11:14) (92% - 96%)    Pt refusing full physical exam on day of discharge, but appears well and in no acute distress, physical exam grossly unchanged from below (10/27):    GENERAL:  [ x ] NAD , [ x ] well appearing, [  ] Agitated, [  ] Drowsy,  [  ] Lethargy, [  ] confused   HEAD:  [x  ] Normal, [  ] Other  EYES:  [ x ] EOMI, [ x ] PERRLA, [ x ] conjunctiva and sclera clear normal, [  ] Other,  [  ] Pallor,[  ] Discharge  ENMT:  [ x ] Normal, [ x ] Moist mucous membranes, [ x ] Good dentition, [ x ] No Thrush  NECK:  [ x ] Supple, [ x ] No JVD, [ x ] Normal thyroid, [  ] Lymphadenopathy [  ] Other  CHEST/LUNG:  [ x ] Clear to auscultation bilaterally, [ x ] Breath Sounds equal B/L  [  ] poor effort  [ x ] No rales, [ x ] No rhonchi  [ x ]  No wheezing,   HEART:  [ x ] Regular rate and rhythm, [  ] tachycardia, [  ] Bradycardia,  [  ] irregular  [ x ] No murmurs, No rubs, No gallops, [  ] PPM in place (Mfr:  )  ABDOMEN:  [ x ] Soft, [x] nontender [ x] Nondistended, [x  ] No mass, [ ] Bowel sounds present, [x] obese  NERVOUS SYSTEM:  [x  ] Alert & Oriented X3, [ x ] Nonfocal  [  ] Confusion  [  ] Encephalopathic [  ] Sedated [  ] Unable to assess, [  ] Dementia [  ] Other-  EXTREMITIES: [  ] 2+ Peripheral Pulses, No clubbing, No cyanosis,  [x  ] minimal edema  B/L lower EXT. [  ] PVD stasis skin changes B/L Lower EXT, [  ] wound  LYMPH: No lymphadenopathy noted  SKIN:  [ x ] No rashes or lesions, [  ] Pressure Ulcers, [  ] ecchymosis, [  ] Skin Tears, [ x ] Other    ---  CONSULTANTS:   Cardiology: [facility]   Pulmonology: Dr. Day  Nephrology: Dr. Rodarte  Psych: Dr. Winkler   ---  TIME SPENT:  I, the attending physician, was physically present for the key portions of the evaluation and management (E/M) service provided. The total amount of time spent reviewing the hospital notes, laboratory values, imaging findings, assessing/counseling the patient, discussing with consultant physicians, social work, nursing staff was -- minutes     FROM ADMISSION H+P:   HPI:  75 yo M with PMHx of Type 2 DM (not on insulin), BPH, CAD s/p stents >4 years ago (not on plavix), diverticulitis (hospitalized 07/2020 at 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 Patient states his SOB started on Sunday and has been worse with laying down. Does admit to intermittent CP associated with the SOB and also when getting imaging as that makes him anxious. Does not appear to have a clear understanding of his medical conditions. Also, states he had some bleeding in his mouth on Sunday, but there is no longer bleeding; on exam there is a healing cut on his tongue and inner right cheek with no active bleeding likely from the pt biting his tongue. Admits difficulty laying down straight due to SOB. Denies fevers, chills, abd pain, n/v, sore throat, cough, palpitations  Of note, pt was recently admitted to [facility] on 10/7/21 for hypertensive urgency, Afib and  findings of PNA. was Rxed with IV Zosyn x 7 days  Abx .pt also got 1 u pRBC transfusion past week.    ED Course:   Vitals: BP: 210/96-->181/84, HR: 79, Temp: 98 F, RR: 17, SpO2: 100% on RA-->89% on RA-->98% on 2L NC   Labs: H/H: 9.6/30.6, aPTT: 43, PT/INR: 18.9/1.65, BUN/Cr: 28/2.2, alb: 3.1, GFR: 28, procal: .16, trop: .059, proBNP:10,846  Imaging:   CxR:  Bilateral lung parenchymal airspace opacities, increased from prior. Small pleural effusions suggested  B/l LE Dopplers: No evidence of deep venous thrombosis in either lower extremity.  Bilateral popliteal fossa fluid collections, likely Baker's cysts.  EKG: HR: 74, Atrial fibrillation, Incomplete right bundle branch block, Nonspecific T wave abnormality, Prolonged QT(461)  Received in the ED: Vanco and Zosyn x2, IV lasix 60mg, 10mg Hydralazine IVP x2 (18 Oct 2021 17:24)      ---  HOSPITAL COURSE:   Patient was admitted for hypertensive urgency and CHF exacerbation. Patient received diuretics, blood pressure medications (amlodipine, clonidine, hydralazine). Patient's blood pressure and symptoms stabilized through medication optimization. This hospitalization likely occurred in the setting of poor medication adherence. Cardiology was consulted, pt was seen by Dr. Donaldson group.  He was evaluated by pulmonology for recent pneumonia and was monitored off antibiotics. Troponins were elevated likely 2/2 demand ischemia, which resolved. Patient was monitored on telemetry for afib. Nephrotoxic medications were avoided and nephrology was consulted for CKD 3-4. Patient was monitored for chronic conditions of anemia, T2DM, CAD, hyperlipidemia, depression, and anxiety. Patient's blood pressure stabilized and patient was clinically stable for discharge.     ---  CONSULTANTS:   Cardiology: [facility]   Pulmonology: Dr. Day  Nephrology: Dr. Rodarte  Psych: Dr. Winkler   ---  TIME SPENT:  I, the attending physician, was physically present for the key portions of the evaluation and management (E/M) service provided. 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 Patient states his SOB started on Sunday and has been worse with laying down. Does admit to intermittent CP associated with the SOB and also when getting imaging as that makes him anxious. Does not appear to have a clear understanding of his medical conditions. Also, states he had some bleeding in his mouth on Sunday, but there is no longer bleeding; on exam there is a healing cut on his tongue and inner right cheek with no active bleeding likely from the pt biting his tongue. Admits difficulty laying down straight due to SOB. Denies fevers, chills, abd pain, n/v, sore throat, cough, palpitations  Of note, pt was recently admitted to [facility] on 10/7/21 for hypertensive urgency, Afib and  findings of PNA. was Rxed with IV Zosyn x 7 days  Abx .pt also got 1 u pRBC transfusion past week.    ED Course:   Vitals: BP: 210/96-->181/84, HR: 79, Temp: 98 F, RR: 17, SpO2: 100% on RA-->89% on RA-->98% on 2L NC   Labs: H/H: 9.6/30.6, aPTT: 43, PT/INR: 18.9/1.65, BUN/Cr: 28/2.2, alb: 3.1, GFR: 28, procal: .16, trop: .059, proBNP:10,846  Imaging:   CxR:  Bilateral lung parenchymal airspace opacities, increased from prior. Small pleural effusions suggested  B/l LE Dopplers: No evidence of deep venous thrombosis in either lower extremity.  Bilateral popliteal fossa fluid collections, likely Baker's cysts.  EKG: HR: 74, Atrial fibrillation, Incomplete right bundle branch block, Nonspecific T wave abnormality, Prolonged QT(461)  Received in the ED: Vanco and Zosyn x2, IV lasix 60mg, 10mg Hydralazine IVP x2 (18 Oct 2021 17:24)      ---  HOSPITAL COURSE:   Patient was admitted for hypertensive urgency and Diastolic CHF exacerbation. Patient received diuretics, blood pressure medications (amlodipine, clonidine, hydralazine). Patient's blood pressure and symptoms stabilized through medication optimization.  This hospitalization likely occurred in the setting of poor medication adherence. Cardiology was consulted, pt was seen by Dr. Donaldson group.  He was evaluated by pulmonology for recent pneumonia and was monitored off antibiotics.  Anemia 2/2 CKD, M CAMILLE with + IGg Paraproteins ,Troponins were elevated likely 2/2 demand ischemia, which resolved. Patient was monitored on telemetry for afib. Nephrotoxic medications were avoided and nephrology was consulted for CKD 3-4. Patient was monitored for chronic conditions of anemia, T2DM, CAD, hyperlipidemia, depression, and anxiety. Patient's blood pressure stabilized , All the meds reviewed several times with Cardiologist before d/c . and patient was clinically stable for discharge. PPD was placed on Rt Fore arm-0 mm induration-NEG , COVID PCR -Neg, NO PT need, pt was in hospital awaiting appropriate D/C Plan , as per Dr Winkler -pt has No Capacity to manage his meds & about medical decisions capacity. Labs remains stable.      Total  d/c care time is 60 minutes.   I have discussed & raised my concern for pt's care at his [facility] & staff & pt unable to manage pt's meds causing pt to return to ER & Hospital readmissions multiple times. I Have d/w ACT team staff- Vivian Montoya Dave at [facility],  & Medical director  DR Alfonso [phone] today at detail.  d/w social work about concern for pt's readmission back to Hospital,        ---  CONSULTANTS:   Cardiology: [facility]   Pulmonology: Dr. Day  Nephrology: Dr. Rodarte  Psych: Dr. Winkler   ---  TIME SPENT:  I, the attending physician, was physically present for the key portions of the evaluation and management (E/M) service provided. The total amount of time spent reviewing the hospital notes, laboratory values, imaging findings, assessing/counseling the patient, discussing with consultant physicians, social work, nursing staff was 60 minutes

## 2021-10-22 LAB
ALBUMIN SERPL ELPH-MCNC: 2.9 G/DL — LOW (ref 3.3–5)
ALP SERPL-CCNC: 50 U/L — SIGNIFICANT CHANGE UP (ref 40–120)
ALT FLD-CCNC: 21 U/L — SIGNIFICANT CHANGE UP (ref 12–78)
ANION GAP SERPL CALC-SCNC: 8 MMOL/L — SIGNIFICANT CHANGE UP (ref 5–17)
AST SERPL-CCNC: 15 U/L — SIGNIFICANT CHANGE UP (ref 15–37)
BASOPHILS # BLD AUTO: 0.03 K/UL — SIGNIFICANT CHANGE UP (ref 0–0.2)
BASOPHILS NFR BLD AUTO: 0.4 % — SIGNIFICANT CHANGE UP (ref 0–2)
BILIRUB SERPL-MCNC: 0.3 MG/DL — SIGNIFICANT CHANGE UP (ref 0.2–1.2)
BUN SERPL-MCNC: 38 MG/DL — HIGH (ref 7–23)
CALCIUM SERPL-MCNC: 8.8 MG/DL — SIGNIFICANT CHANGE UP (ref 8.5–10.1)
CHLORIDE SERPL-SCNC: 109 MMOL/L — HIGH (ref 96–108)
CO2 SERPL-SCNC: 25 MMOL/L — SIGNIFICANT CHANGE UP (ref 22–31)
CREAT SERPL-MCNC: 2.6 MG/DL — HIGH (ref 0.5–1.3)
EOSINOPHIL # BLD AUTO: 0.18 K/UL — SIGNIFICANT CHANGE UP (ref 0–0.5)
EOSINOPHIL NFR BLD AUTO: 2.5 % — SIGNIFICANT CHANGE UP (ref 0–6)
GLUCOSE SERPL-MCNC: 136 MG/DL — HIGH (ref 70–99)
HCT VFR BLD CALC: 29.7 % — LOW (ref 39–50)
HGB BLD-MCNC: 9 G/DL — LOW (ref 13–17)
IMM GRANULOCYTES NFR BLD AUTO: 0.4 % — SIGNIFICANT CHANGE UP (ref 0–1.5)
LYMPHOCYTES # BLD AUTO: 0.98 K/UL — LOW (ref 1–3.3)
LYMPHOCYTES # BLD AUTO: 13.4 % — SIGNIFICANT CHANGE UP (ref 13–44)
MCHC RBC-ENTMCNC: 25.9 PG — LOW (ref 27–34)
MCHC RBC-ENTMCNC: 30.3 GM/DL — LOW (ref 32–36)
MCV RBC AUTO: 85.3 FL — SIGNIFICANT CHANGE UP (ref 80–100)
MONOCYTES # BLD AUTO: 0.47 K/UL — SIGNIFICANT CHANGE UP (ref 0–0.9)
MONOCYTES NFR BLD AUTO: 6.4 % — SIGNIFICANT CHANGE UP (ref 2–14)
NEUTROPHILS # BLD AUTO: 5.64 K/UL — SIGNIFICANT CHANGE UP (ref 1.8–7.4)
NEUTROPHILS NFR BLD AUTO: 76.9 % — SIGNIFICANT CHANGE UP (ref 43–77)
NRBC # BLD: 0 /100 WBCS — SIGNIFICANT CHANGE UP (ref 0–0)
PLATELET # BLD AUTO: 306 K/UL — SIGNIFICANT CHANGE UP (ref 150–400)
POTASSIUM SERPL-MCNC: 3.5 MMOL/L — SIGNIFICANT CHANGE UP (ref 3.5–5.3)
POTASSIUM SERPL-SCNC: 3.5 MMOL/L — SIGNIFICANT CHANGE UP (ref 3.5–5.3)
PROT SERPL-MCNC: 7.3 G/DL — SIGNIFICANT CHANGE UP (ref 6–8.3)
RBC # BLD: 3.48 M/UL — LOW (ref 4.2–5.8)
RBC # FLD: 17.2 % — HIGH (ref 10.3–14.5)
SODIUM SERPL-SCNC: 142 MMOL/L — SIGNIFICANT CHANGE UP (ref 135–145)
WBC # BLD: 7.33 K/UL — SIGNIFICANT CHANGE UP (ref 3.8–10.5)
WBC # FLD AUTO: 7.33 K/UL — SIGNIFICANT CHANGE UP (ref 3.8–10.5)

## 2021-10-22 PROCEDURE — 99232 SBSQ HOSP IP/OBS MODERATE 35: CPT

## 2021-10-22 RX ORDER — ACETAMINOPHEN 500 MG
650 TABLET ORAL ONCE
Refills: 0 | Status: COMPLETED | OUTPATIENT
Start: 2021-10-22 | End: 2021-10-22

## 2021-10-22 RX ORDER — TUBERCULIN PURIFIED PROTEIN DERIVATIVE 5 [IU]/.1ML
5 INJECTION, SOLUTION INTRADERMAL ONCE
Refills: 0 | Status: COMPLETED | OUTPATIENT
Start: 2021-10-22 | End: 2021-10-22

## 2021-10-22 RX ORDER — FUROSEMIDE 40 MG
60 TABLET ORAL
Refills: 0 | Status: DISCONTINUED | OUTPATIENT
Start: 2021-10-23 | End: 2021-10-29

## 2021-10-22 RX ORDER — POTASSIUM CHLORIDE 20 MEQ
40 PACKET (EA) ORAL ONCE
Refills: 0 | Status: COMPLETED | OUTPATIENT
Start: 2021-10-22 | End: 2021-10-22

## 2021-10-22 RX ADMIN — Medication 150 MILLIGRAM(S): at 12:43

## 2021-10-22 RX ADMIN — Medication 0.1 MILLIGRAM(S): at 21:29

## 2021-10-22 RX ADMIN — FAMOTIDINE 20 MILLIGRAM(S): 10 INJECTION INTRAVENOUS at 12:41

## 2021-10-22 RX ADMIN — Medication 8 MILLIGRAM(S): at 21:29

## 2021-10-22 RX ADMIN — Medication 81 MILLIGRAM(S): at 12:43

## 2021-10-22 RX ADMIN — Medication 0.1 MILLIGRAM(S): at 05:13

## 2021-10-22 RX ADMIN — BUDESONIDE AND FORMOTEROL FUMARATE DIHYDRATE 2 PUFF(S): 160; 4.5 AEROSOL RESPIRATORY (INHALATION) at 06:39

## 2021-10-22 RX ADMIN — TUBERCULIN PURIFIED PROTEIN DERIVATIVE 5 UNIT(S): 5 INJECTION, SOLUTION INTRADERMAL at 19:40

## 2021-10-22 RX ADMIN — Medication 100 MILLIGRAM(S): at 05:13

## 2021-10-22 RX ADMIN — APIXABAN 5 MILLIGRAM(S): 2.5 TABLET, FILM COATED ORAL at 05:13

## 2021-10-22 RX ADMIN — Medication 100 MILLIGRAM(S): at 21:29

## 2021-10-22 RX ADMIN — BUDESONIDE AND FORMOTEROL FUMARATE DIHYDRATE 2 PUFF(S): 160; 4.5 AEROSOL RESPIRATORY (INHALATION) at 20:44

## 2021-10-22 RX ADMIN — Medication 60 MILLIGRAM(S): at 05:12

## 2021-10-22 RX ADMIN — Medication 650 MILLIGRAM(S): at 22:25

## 2021-10-22 RX ADMIN — Medication 40 MILLIEQUIVALENT(S): at 13:53

## 2021-10-22 RX ADMIN — ATORVASTATIN CALCIUM 10 MILLIGRAM(S): 80 TABLET, FILM COATED ORAL at 21:29

## 2021-10-22 RX ADMIN — Medication 0.1 MILLIGRAM(S): at 13:46

## 2021-10-22 RX ADMIN — LAMOTRIGINE 100 MILLIGRAM(S): 25 TABLET, ORALLY DISINTEGRATING ORAL at 12:42

## 2021-10-22 RX ADMIN — Medication 1 MILLIGRAM(S): at 12:42

## 2021-10-22 RX ADMIN — ISOSORBIDE MONONITRATE 30 MILLIGRAM(S): 60 TABLET, EXTENDED RELEASE ORAL at 12:43

## 2021-10-22 RX ADMIN — PREGABALIN 1000 MICROGRAM(S): 225 CAPSULE ORAL at 12:42

## 2021-10-22 RX ADMIN — MIRTAZAPINE 30 MILLIGRAM(S): 45 TABLET, ORALLY DISINTEGRATING ORAL at 21:30

## 2021-10-22 RX ADMIN — Medication 60 MILLIGRAM(S): at 05:13

## 2021-10-22 RX ADMIN — ARIPIPRAZOLE 30 MILLIGRAM(S): 15 TABLET ORAL at 12:42

## 2021-10-22 RX ADMIN — Medication 100 MILLIGRAM(S): at 13:49

## 2021-10-22 RX ADMIN — AMLODIPINE BESYLATE 10 MILLIGRAM(S): 2.5 TABLET ORAL at 05:13

## 2021-10-22 RX ADMIN — APIXABAN 5 MILLIGRAM(S): 2.5 TABLET, FILM COATED ORAL at 17:33

## 2021-10-22 NOTE — PROGRESS NOTE ADULT - ASSESSMENT
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) living in a group home [facility]    vs noted  labs reviewed  on LASIX IV for CHF - dose increased -   MBS noted - Dysphagia diet -     completed ABX for PNA  ct chest from recent admission reviewed, cxr noted, repeat CT chest reviewed - multifocal pna reported - however - clinically does not correlate -   would monitor off ABX - biomarkers - cx - monitor VS and Sat  assess - eval for HF - vol overload - Diuresis - I and O - cardio eval - TTE reviewed  ELMER - does not tolerate CPAP  CKD - monitor renal indices - i and o - replete lytes  dysphagia diet - asp prec - HOB elev - oral hygiene  assist with needs - ADL  emotional support  tele monitor  old records reviewed

## 2021-10-22 NOTE — PROGRESS NOTE ADULT - PROBLEM SELECTOR PLAN 6
- CKD 3-4- Cr stable   - baseline Cr appears to be 1.6 - 2.3  - Cr on admission 2.2; improved from 2.5 on 10/16  - Avoid nephrotoxic medications  - Trend BMP  - Nephro, Dr. Rodarte following - Lasix 60mg PO BID

## 2021-10-22 NOTE — PROGRESS NOTE ADULT - SUBJECTIVE AND OBJECTIVE BOX
[facility] -- Fifi Bliss, Kalpesh Valdovinos, Abraham Sheridan Savella, Goodger: Office # [phone]    Follow Up:  Hypertensive Urgency AFib Chest pain     Subjective/Observations: Patient seen and examined. Patient awake, alert, resting comfortably in bed. Pt reports SOB this AM at 5:30 and he received Lasix IV with good relief. He currently denies chest pain, SOB, dizziness or palpitations. Tolerating room air. No signs of orthopnea or PND.     REVIEW OF SYSTEMS: All review of systems is negative for eye, ENT, GI, , allergic, dermatologic, musculoskeletal and neurologic except as described above    PAST MEDICAL & SURGICAL HISTORY:  HTN (hypertension)  c/b multiple episodes of hypertensive urgency    HLD (hyperlipidemia)    Atrial fibrillation  first documented on EKG 10/7/2021    CAD (coronary artery disease)  s/p stents (not on plavix)    Type 2 diabetes mellitus  not on home insulin/ Meds    Anxiety  Depression  multiple psych medications    History of diverticulitis  07/2021    Diverticulosis  c/b GIB in 2020    Afib  on AC    Stage 3 chronic kidney disease    Anemia of chronic disease  Monoclonal Gammopathy-MGUS  pRBC transfusion 10/15/21    Chronic diastolic congestive heart failure    Multiple thyroid nodules    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  Had surgery ( April 2013 )    S/P tonsillectomy    S/P arthroscopic knee surgery  Bilateral ( 2005 )    Torsion of testicle  Had surgery at age 14    Pilonidal cyst  Had surgery ( 1969 )    S/P cataract surgery  Bilateral    H/O hernia repair        MEDICATIONS  (STANDING):  amLODIPine   Tablet 10 milliGRAM(s) Oral daily  apixaban 5 milliGRAM(s) Oral every 12 hours  ARIPiprazole 30 milliGRAM(s) Oral daily  aspirin enteric coated 81 milliGRAM(s) Oral daily  atorvastatin 10 milliGRAM(s) Oral at bedtime  budesonide 160 MICROgram(s)/formoterol 4.5 MICROgram(s) Inhaler 2 Puff(s) Inhalation two times a day  cloNIDine 0.1 milliGRAM(s) Oral three times a day  cyanocobalamin 1000 MICROGram(s) Oral daily  doxazosin 8 milliGRAM(s) Oral at bedtime  famotidine    Tablet 20 milliGRAM(s) Oral daily  folic acid 1 milliGRAM(s) Oral daily  furosemide   Injectable 60 milliGRAM(s) IV Push every 12 hours  hydrALAZINE 100 milliGRAM(s) Oral three times a day  isosorbide   mononitrate ER Tablet (IMDUR) 30 milliGRAM(s) Oral daily  lamoTRIgine 100 milliGRAM(s) Oral daily  mirtazapine 30 milliGRAM(s) Oral at bedtime  NIFEdipine XL 60 milliGRAM(s) Oral daily  venlafaxine XR. 150 milliGRAM(s) Oral daily    MEDICATIONS  (PRN):    Allergies    No Known Allergies    Intolerances      Vital Signs Last 24 Hrs  T(C): 36.3 (22 Oct 2021 04:43), Max: 37.1 (21 Oct 2021 19:31)  T(F): 97.3 (22 Oct 2021 04:43), Max: 98.8 (21 Oct 2021 19:31)  HR: 72 (22 Oct 2021 04:43) (68 - 72)  BP: 167/77 (22 Oct 2021 04:43) (144/71 - 167/77)  BP(mean): --  RR: 18 (22 Oct 2021 04:43) (17 - 18)  SpO2: 94% (22 Oct 2021 04:43) (91% - 94%)  I&O's Summary    21 Oct 2021 07:01  -  22 Oct 2021 07:00  --------------------------------------------------------  IN: 0 mL / OUT: 600 mL / NET: -600 mL          TELE: AFlutter  PHYSICAL EXAM:  Appearance: NAD, no distress, alert, Well developed   HEENT: Moist Mucous Membranes, Anicteric  Cardiovascular: Regular rate and rhythm, Normal S1 S2, No JVD, + murmur, No rubs, gallops or clicks  Respiratory: Non-labored, Decreased breath sounds No rales, No rhonchi, No wheezing.   Gastrointestinal:  Soft, Non-tender, + BS  Skin: Warm and dry, No visible rashes or ulcers, No ecchymosis, No cyanosis  Musculoskeletal: No clubbing, No cyanosis, No joint swelling/tenderness  Psychiatry: Mood & affect appropriate  Lymph: No peripheral edema.     LABS: All Labs Reviewed:                        9.5    6.10  )-----------( 329      ( 21 Oct 2021 07:01 )             31.0                         8.0    7.38  )-----------( 295      ( 20 Oct 2021 08:47 )             26.4     21 Oct 2021 07:01    140    |  108    |  39     ----------------------------<  112    3.5     |  25     |  2.60   20 Oct 2021 08:47    142    |  109    |  42     ----------------------------<  110    3.1     |  26     |  2.80     Ca    8.7        21 Oct 2021 07:01  Ca    8.1        20 Oct 2021 08:47    TPro  6.9    /  Alb  2.7    /  TBili  0.2    /  DBili  x      /  AST  12     /  ALT  21     /  AlkPhos  49     21 Oct 2021 07:01  TPro  6.1    /  Alb  2.4    /  TBili  0.3    /  DBili  x      /  AST  13     /  ALT  19     /  AlkPhos  42     20 Oct 2021 08:47      Troponin I, Serum: .084 ng/mL (10-19-21 @ 02:22)  Creatine Kinase, Serum: 90 U/L (10-18-21 @ 20:09)  Troponin I, Serum: .086 ng/mL (10-18-21 @ 20:09)  Cholesterol, Serum: 174 mg/dL (10-07-21 @ 11:54)  HDL Cholesterol, Serum: 42 mg/dL (10-07-21 @ 11:54)  Triglycerides, Serum: 160 mg/dL (10-07-21 @ 11:54)      12 Lead ECG:   Ventricular Rate 74 BPM  Atrial Rate 70 BPM  QRS Duration 114 ms  Q-T Interval 416 ms  QTC Calculation(Bazett) 461 ms  R Axis -10 degrees  T Axis 52 degrees  Diagnosis Line Atrial fibrillation  Incomplete right bundle branch block  Moderate voltage criteria for LVH, may be normal variant  Nonspecific T wave abnormality  Prolonged QT  Abnormal ECG  Confirmed by Victorino Valdovinos MD ([id number]) on 10/18/2021 1:39:20 PM (10-18-21 @ 12:24)    < from: TTE Echo Complete w/o Contrast w/ Doppler (10.13.21 @ 09:51) >   EXAM:  ECHO TTE WO CON COMP W DOPP    PROCEDURE DATE:  10/13/2021    INTERPRETATION:  Ordering Physician: SKYLA TERRY [id number]  Indication: Cardiac arrhythmias.  Technician: INDIGO  Study Quality: Technical difficult study.  A complete echocardiographic study was performed utilizing standard protocol including spectral and color Doppler in all echocardiographic windows.  Height: 172cm  Weight: 102kg  BSA: 2.1m2  Blood Pressure: 150/90  MEASUREMENTS  IVS: 1.3cm  PWT: 1.3cm  LA: 4.2cm  AO: 3.5cm  LVIDd: 5.4 cm.  LVIDs: 3.5cm  LVEF: 55-60%.  PASP: 34mm Hg  FINDINGS  Left Ventricle: Normal in  size and normal LV systolic function with estimated LVEF 55-60%.  Right Ventricle: Normal in size and normal RV systolic function.  Left Atrium: Mild dilated.  Right Atrium: Normal in size.  Mitral Valve: Mild mitral annular calcification is present. Mitral valve is mildly calcified and no evidence of any mitral stenosis. Mild mitral regurgitation is present.  Aortic Valve: Aortic root is mildsclerotic and of normal dimension. Aortic valve is mildly calcified and mild  aortic stenosis is present with peak gradient across aortic valve is 30 mmHg. Aortic valve area not calculated.  Tricuspid Valve: Mild tricuspid regurgitation with calculated PA systolic pressure 34 mmHg is present. No evidence of any pulmonary hypertension  Pulmonic Valve: Trace Pulmonary regurgitation is present.  Diastolic Function: LV diastolic function cannot determine due to  underlying atrial fibrillation.  Pericardium/Pleura: No evidence of any pericardial effusion.  No intracardiac mass  thrombus or vegetations and  tumor.  Mild concentric left ventricular hypertrophy is present.  IMPRESSION:  Normal LV size with normal LV systolic function with estimated LVEF 55-60%.  LV diastolic function cannot determine due to atrial fibrillation.  Mild mitral regurgitation is present.  Mild aortic stenosis is  present.  Mild tricuspid regurgitation is present . No evidence of any pulmonary hypertension  Correlate clinically above findings.  --- End of Report ---    < end of copied text >    < from: CT Chest No Cont (10.18.21 @ 19:26) >  EXAM:  CT CHEST                        PROCEDURE DATE:  10/18/2021    INTERPRETATION:  PROCEDURE INFORMATION:  Exam: CT Chest Without Contrast; Diagnostic  Exam date and time: 10/18/2021 7:20 PM  Age: 76 years old  Clinical indication: Shortness of breath  TECHNIQUE:  Imaging protocol: Diagnostic computed tomography of the chest without contrast.  COMPARISON:  CT CHEST 10/6/2021  FINDINGS:  Lungs: Multifocal bilateral pneumonia, organism nonspecific, does not have the characteristic appearance of COVID-19 but is within range of what can be seen with COVID-19 pneumonia.  Pleural spaces: Small bilateral pleural effusions.  Heart: Cardiomegaly.  Aorta: Atherosclerotic calcifications of thoracic aorta and coronary arteries. No aortic aneurysm.  Lymph nodes: Unremarkable. No enlarged lymph nodes.  Bones/joints: Degenerative changes. No acute fracture.  Soft tissues: Unremarkable.  Upper abdomen: Right renal cysts. Nonspecific left adrenal thickening. Colonic diverticulosis.  IMPRESSION:  1. Small bilateral pleural effusions.  2. Multifocal bilateral pneumonia, organism nonspecific, does not have the characteristic appearance of COVID-19 but is within range of what can be seen with COVID-19 pneumonia.  --- End of Report ---  < end of copied text >    < from: US Duplex Venous Lower Ext Complete, Bilateral (10.18.21 @ 14:03) >  IMPRESSION:  No evidence of deep venous thrombosis in either lower extremity.  Bilateral popliteal fossa fluid collections, likely Baker's cysts.  --- End of Report ---  < end of copied text >

## 2021-10-22 NOTE — PROGRESS NOTE ADULT - ASSESSMENT
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

## 2021-10-22 NOTE — PROGRESS NOTE ADULT - PROBLEM SELECTOR PLAN 2
- Pt presented with /96 --- unclear if pt is compliant with his medications as he does not appear to have a good understanding of his medical conditions --- Psych, Dr. Winkler, consulted for assessment of capacity- ok to discharge from psych standpoint   - No vision changes, headache, ARIELLA, current CP, ECHO done.  - C/w with home antihypertensives:   -  Continue clonidine 0.1 TID  - On hydralazine 100 mg q 8 hrs   - continue amlodipine 10 mg qd  - continue nifedipine XL  - continue  Imdur 30mg   - Cardio, Dr. Bliss's group, following. - Pt presented with /96 --- unclear if pt is compliant with his medications as he does not appear to have a good understanding of his medical conditions --- Psych, Dr. Winkler, consulted for assessment of capacity- ok to discharge from psych standpoint   - No vision changes, headache, ARIELLA, current CP, ECHO done.  - C/w with home antihypertensives:  Lasix 60 mg 2x day  -  Continue clonidine 0.1 TID  - On hydralazine 100 mg q 8 hrs   - continue amlodipine 10 mg qd  - continue nifedipine XL  - continue  Imdur 30mg   - Cardio, Dr. Bliss's group, following.

## 2021-10-22 NOTE — PROGRESS NOTE ADULT - SUBJECTIVE AND OBJECTIVE BOX
Patient is a 76y old  Male who presents with a chief complaint of shortness of breath (19 Oct 2021 09:10)  Patient seen in follow up for CKD 4, fluid overload.        PAST MEDICAL HISTORY:  Hypertension    Diabetes mellitus    Lupus    Hepatitis C    Anxiety and depression    CAD (coronary artery disease)    Diverticulosis    Hyperlipidemia    HTN (hypertension)    HLD (hyperlipidemia)    Atrial fibrillation    CAD (coronary artery disease)    Type 2 diabetes mellitus    Anxiety    History of diverticulitis    Diverticulosis    Afib    Stage 3 chronic kidney disease    Anemia of chronic disease    Chronic diastolic congestive heart failure    Multiple thyroid nodules      MEDICATIONS  (STANDING):  amLODIPine   Tablet 10 milliGRAM(s) Oral daily  apixaban 5 milliGRAM(s) Oral every 12 hours  ARIPiprazole 30 milliGRAM(s) Oral daily  aspirin enteric coated 81 milliGRAM(s) Oral daily  atorvastatin 10 milliGRAM(s) Oral at bedtime  budesonide 160 MICROgram(s)/formoterol 4.5 MICROgram(s) Inhaler 2 Puff(s) Inhalation two times a day  cloNIDine 0.1 milliGRAM(s) Oral three times a day  cyanocobalamin 1000 MICROGram(s) Oral daily  doxazosin 8 milliGRAM(s) Oral at bedtime  famotidine    Tablet 20 milliGRAM(s) Oral daily  folic acid 1 milliGRAM(s) Oral daily  hydrALAZINE 100 milliGRAM(s) Oral three times a day  isosorbide   mononitrate ER Tablet (IMDUR) 30 milliGRAM(s) Oral daily  lamoTRIgine 100 milliGRAM(s) Oral daily  mirtazapine 30 milliGRAM(s) Oral at bedtime  NIFEdipine XL 60 milliGRAM(s) Oral daily  venlafaxine XR. 150 milliGRAM(s) Oral daily    MEDICATIONS  (PRN):    T(C): 36.6 (10-22-21 @ 11:46), Max: 37.1 (10-21-21 @ 19:31)  HR: 74 (10-22-21 @ 11:46) (56 - 74)  BP: 165/72 (10-22-21 @ 11:46) (135/78 - 167/77)  RR: 18 (10-22-21 @ 11:46)  SpO2: 93% (10-22-21 @ 11:46)  Wt(kg): --  I&O's Detail    21 Oct 2021 07:01  -  22 Oct 2021 07:00  --------------------------------------------------------  IN:  Total IN: 0 mL    OUT:    Voided (mL): 600 mL  Total OUT: 600 mL    Total NET: -600 mL                PHYSICAL EXAM:  General: no distress  Respiratory: b/l air entry  Cardiovascular: S1 S2  Gastrointestinal: soft  Extremities: + edema            LABORATORY:                        9.0    7.33  )-----------( 306      ( 22 Oct 2021 09:12 )             29.7     10-22    142  |  109<H>  |  38<H>  ----------------------------<  136<H>  3.5   |  25  |  2.60<H>    Ca    8.8      22 Oct 2021 09:12    TPro  7.3  /  Alb  2.9<L>  /  TBili  0.3  /  DBili  x   /  AST  15  /  ALT  21  /  AlkPhos  50  10-22    Sodium, Serum: 142 mmol/L (10-22 @ 09:12)  Sodium, Serum: 140 mmol/L (10-21 @ 07:01)    Potassium, Serum: 3.5 mmol/L (10-22 @ 09:12)  Potassium, Serum: 3.5 mmol/L (10-21 @ 07:01)    Hemoglobin: 9.0 g/dL (10-22 @ 09:12)  Hemoglobin: 9.5 g/dL (10-21 @ 07:01)  Hemoglobin: 8.0 g/dL (10-20 @ 08:47)    Creatinine, Serum 2.60 (10-22 @ 09:12)  Creatinine, Serum 2.60 (10-21 @ 07:01)  Creatinine, Serum 2.80 (10-20 @ 08:47)        LIVER FUNCTIONS - ( 22 Oct 2021 09:12 )  Alb: 2.9 g/dL / Pro: 7.3 g/dL / ALK PHOS: 50 U/L / ALT: 21 U/L / AST: 15 U/L / GGT: x

## 2021-10-22 NOTE — PROGRESS NOTE ADULT - PROBLEM SELECTOR PLAN 3
- Pt was recent admitted to [facility] from 10/7-10/16 and treated for PNA with IV Zosyn & Augmentin x 7 days   - Although CxR shows bilateral lung parenchymal airspace opacities, increased from prior pt does not have leukocytosis or fever and it is suspected SOB is more so 2/2 CHF exacerbation. CxR findings are suspected to be from previously treated PNA  - s/p vanc and zosyn in ED. will hold off on further Abx at this time.as per Pulmonary Dr Day   - F/u Cultures: pending results, Nl Lactate   - official S&S: dysphagis 3, soft solids   - Pulm, Dr. Day,- Keep Off Abx , glorialey Old PNA   Continue Symbicort 2x day.

## 2021-10-22 NOTE — PROGRESS NOTE ADULT - ASSESSMENT
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 Cardiology consulted for SOB and chest pain    Pleural effusion  - CT chest showed small B/L pleural effusions and multifocal Pna s/p Abx.  No O2 requirement  - ECHO LVEF 55-60%. LV diastolic function cannot determine due to atrial fibrillation. Mild mitral regurgitation is present. Mild aortic stenosis is  present. Mild tricuspid regurgitation is present . No evidence of any pulmonary hypertension  - BNP: 80587.  received Lasix IV today  this AM  appears compensated,  change to po  if stable    - Daily weight,. Monitor Strict I/O's, n Elevated Troponin negative overall     Chest pain (Atypical)  - Patient presented with chest discomfort.  Resolved  - EKG showed Afib, no ischemic changes noted  - Mild troponin leak likely in setting of ARIELLA, peaked no need to further trend     Afib  - Rate-controlled  - Continue Eliquis  - Monitor electrolytes, replete to keep K>4 and Mag>2    Hypertensive Urgency   - On admission /90/ given hydralazine in ED 10 mg IV X2 doses  - BP remains  elevated  - BP: 167/77 (10-22-21 @ 04:43) (144/71 - 167/77)  - Continue clonidine 0.1 TID, Hydralazine 100 mg q 8 hrs, Amlodipine 10 mg qd, Nifedipine XL 60 mg, and Imdur 30mg   - Can increase Imdur to 60mg Qday for     CAD with stent placement   - No clinical evidence of ACS  - Continue with ASA  - Continue statin    - Monitor and replete lytes, keep K>4, Mg>2.  - All other medical needs as per primary team.  - Other cardiovascular workup will depend on clinical course.  - Will continue to follow.    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 Cardiology consulted for SOB and chest pain    Pleural effusion  - CT chest showed small B/L pleural effusions and multifocal Pna s/p Abx.  No O2 requirement  - ECHO LVEF 55-60%. LV diastolic function cannot determine due to atrial fibrillation. Mild mitral regurgitation is present. Mild aortic stenosis is  present. Mild tricuspid regurgitation is present . No evidence of any pulmonary hypertension  - BNP: 32522 Lasix IV 60 mg Q 12  - Received Lasix IV today this AM appears compensated, on room air,  would change to po   - Daily weight,. Monitor Strict I/O's, n Elevated Troponin negative overall     Chest pain (Atypical)  - Patient presented with chest discomfort.  Resolved  - EKG showed Afib, no ischemic changes noted  - Mild troponin leak likely in setting of ARIELLA, peaked no need to further trend     Afib  - Rate-controlled  - Continue Eliquis  - Monitor electrolytes, replete to keep K>4 and Mag>2    Hypertensive Urgency   - On admission /90/ given hydralazine in ED 10 mg IV X2 doses  - BP remains  elevated  - BP: 167/77 (10-22-21 @ 04:43) (144/71 - 167/77)  - Continue clonidine 0.1 TID, Hydralazine 100 mg q 8 hrs, Amlodipine 10 mg qd, Nifedipine XL 60 mg, and Imdur 30mg   - Can increase Imdur to 60mg Qday for     CAD with stent placement   - No clinical evidence of ACS  - Continue with ASA  - Continue statin    - Monitor and replete lytes, keep K>4, Mg>2.  - All other medical needs as per primary team.  - Other cardiovascular workup will depend on clinical course.  - Will continue to follow.    Bernice Arora, MS ANP, AGACNP  Nurse Practitioner- Cardiology   Spectra #[phone]

## 2021-10-22 NOTE — PROGRESS NOTE ADULT - PROBLEM SELECTOR PLAN 1
- Pt presented with SOB worse with laying down and with LE edema Since Sunday  - CxR: Bilateral lung parenchymal airspace opacities, increased from prior. Small pleural effusions suggested  - B/l LE Dopplers: No evidence of deep venous thrombosis in either lower extremity.  - BNP: 62312  - TTE on 10/13/21(previous admission): LVEF 55-60%. LV diastolic function cannot determine due to atrial fibrillation. -- Do not need to repeat at time AC on Chronic Diastolic CHF   - supplemental O2 as needed   - s/p 60mg IVP  - cont Lasix 60mg PO BID  - am CT chest:   1. Small bilateral pleural effusions.  2. Multifocal bilateral pneumonia, organism nonspecific, does not have the characteristic appearance of COVID-19 but is within range of what can be seen with COVID-19 pneumonia.  - Monitor fluid status closely  - Cardio, Dr. Bliss's group, following.  - Pulm following- recommends monitoring off antibiotics at this time

## 2021-10-22 NOTE — PROGRESS NOTE ADULT - ATTENDING COMMENTS
Seen/examined. agree with above.  feeling better after iv lasix this morning  if improves, will plan to switch to po diuretics tomorrow Seen/examined. agree with above.  feeling better after iv lasix this morning  can titrate up imdur for htn  if improves, will plan to switch to po diuretics tomorrow

## 2021-10-22 NOTE — PROGRESS NOTE ADULT - SUBJECTIVE AND OBJECTIVE BOX
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 Patient states his SOB started on Sunday and has been worse with laying down. Does admit to intermittent CP associated with the SOB and also when getting imaging as that makes him anxious. Does not appear to have a clear understanding of his medical conditions. Also, states he had some bleeding in his mouth on Sunday, but there is no longer bleeding; on exam there is a healing cut on his tongue and inner right cheek with no active bleeding likely from the pt biting his tongue. Admits difficulty laying down straight due to SOB. Denies fevers, chills, abd pain, n/v, sore throat, cough, palpitations  Of note, pt was recently admitted to [facility] on 10/7/21 for hypertensive urgency, Afib and  findings of PNA. was Rxed with IV Zosyn x 7 days  Abx .pt also got 1 u pRBC transfusion past week.    ED Course:   Vitals: BP: 210/96-->181/84, HR: 79, Temp: 98 F, RR: 17, SpO2: 100% on RA-->89% on RA-->98% on 2L NC   Labs: H/H: 9.6/30.6, aPTT: 43, PT/INR: 18.9/1.65, BUN/Cr: 28/2.2, alb: 3.1, GFR: 28, procal: .16, trop: .059, proBNP:10,846  Imaging:   CxR:  Bilateral lung parenchymal airspace opacities, increased from prior. Small pleural effusions suggested  B/l LE Dopplers: No evidence of deep venous thrombosis in either lower extremity.  Bilateral popliteal fossa fluid collections, likely Baker's cysts.  EKG: HR: 74, Atrial fibrillation, Incomplete right bundle branch block, Nonspecific T wave abnormality, Prolonged QT(461)  Received in the ED: Vanco and Zosyn x2, IV lasix 60mg, 10mg Hydralazine IVP x2 (18 Oct 2021 17:24)    INTERVAL HPI:  10/19/21: Patient was seen and examined at bedside. Denies any complaints. States his shortness of breath has improved.   10/20/21: Patient was seen and examined at bedside. States he was not able to sleep well because of another patient screaming across the room. Otherwise denies worsening SOB, chest pain, palpitations.  10/21/21: Patient was seen and examined at bedside. Patient comfortably having breakfast in bed, alert and awake. States he has no further complaints. Stable Labs Low H/H . Cr stable.  10/22/21: No acute events overnight. Patient seen and examined at bedside. Patient denies any fever, chills, chest pain, dyspnea, abdominal pain, leg pain/swelling, dysuria, constipation, diarhea. Needs TB screening. PPD to be placed.       OVERNIGHT EVENTS: none    Home Medications:  Lasix 40 mg oral tablet: 1 tab(s) orally once a day (18 Oct 2021 17:52)  Pepcid 20 mg oral tablet: 1 tab(s) orally every other day (18 Oct 2021 17:52)      MEDICATIONS  (STANDING):  amLODIPine   Tablet 10 milliGRAM(s) Oral daily  apixaban 5 milliGRAM(s) Oral every 12 hours  ARIPiprazole 30 milliGRAM(s) Oral daily  aspirin enteric coated 81 milliGRAM(s) Oral daily  atorvastatin 10 milliGRAM(s) Oral at bedtime  budesonide 160 MICROgram(s)/formoterol 4.5 MICROgram(s) Inhaler 2 Puff(s) Inhalation two times a day  cloNIDine 0.1 milliGRAM(s) Oral three times a day  cyanocobalamin 1000 MICROGram(s) Oral daily  doxazosin 8 milliGRAM(s) Oral at bedtime  famotidine    Tablet 20 milliGRAM(s) Oral daily  folic acid 1 milliGRAM(s) Oral daily  hydrALAZINE 100 milliGRAM(s) Oral three times a day  isosorbide   mononitrate ER Tablet (IMDUR) 30 milliGRAM(s) Oral daily  lamoTRIgine 100 milliGRAM(s) Oral daily  mirtazapine 30 milliGRAM(s) Oral at bedtime  NIFEdipine XL 60 milliGRAM(s) Oral daily  PPD  5 Tuberculin Unit(s) Injectable 5 Unit(s) IntraDermal once  venlafaxine XR. 150 milliGRAM(s) Oral daily    MEDICATIONS  (PRN):      Allergies    No Known Allergies    Intolerances        Social History:  Tobacco: quit in 1980, not active smoker  EtOH: denies   Recreational drug use: cocaine several years ago "a few times" has not used in "many years"  Lives with: [facility] house; group home; no nursing assistance; observation is there  Ambulates: independent, denies walker or cane but uses guard railings  ADLs: independent, but states need for assistance in bathing, cooking; independent with feeding, ambulating  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[place]  Vaccinations: Moderna x2 doses last dose in May (18 Oct 2021 17:24)      REVIEW OF SYSTEMS:  CONSTITUTIONAL: No fever, No chills, No fatigue, No myalgia, No Body ache, No Weakness  EYES: No eye pain,  No visual disturbances, No discharge, NO Redness  ENMT:  No ear pain, No nose bleed, No vertigo; No sinus pain, NO throat pain, No Congestion  NECK: No pain, No stiffness  RESPIRATORY: No cough, NO wheezing, No  hemoptysis, NO  shortness of breath  CARDIOVASCULAR: No chest pain, palpitations  GASTROINTESTINAL: No abdominal pain, NO epigastric pain. No nausea, No vomiting; No diarrhea, No constipation. [  ] BM  GENITOURINARY: No dysuria, No frequency, No urgency, No hematuria, NO incontinence  NEUROLOGICAL: No headaches, No dizziness, No numbness, No tingling, No tremors, No weakness  EXT: No Swelling, No Pain, No Edema  SKIN:  [ x ] No itching, burning, rashes, or lesions   MUSCULOSKELETAL: No joint pain ,No Jt swelling; No muscle pain, No back pain, No extremity pain  PSYCHIATRIC: No depression,  No anxiety,  No mood swings ,No difficulty sleeping at night  PAIN SCALE: [ x ] None  [  ] Other-  ROS Unable to obtain due to - [  ] Dementia  [  ] Lethargy [  ] Drowsy [  ] Sedated [  ] non verbal  REST OF REVIEW Of SYSTEM - [ x ] Normal       Vital Signs Last 24 Hrs  T(C): 36.6 (22 Oct 2021 11:46), Max: 37.1 (21 Oct 2021 19:31)  T(F): 97.8 (22 Oct 2021 11:46), Max: 98.8 (21 Oct 2021 19:31)  HR: 74 (22 Oct 2021 11:46) (68 - 74)  BP: 165/72 (22 Oct 2021 11:46) (164/81 - 167/77)  BP(mean): --  RR: 18 (22 Oct 2021 11:46) (17 - 18)  SpO2: 93% (22 Oct 2021 11:46) (91% - 94%)  Finger Stick        10-21 @ 07:01  -  10-22 @ 07:00  --------------------------------------------------------  IN: 0 mL / OUT: 600 mL / NET: -600 mL        PHYSICAL EXAM:  GENERAL:  [ x ] NAD , [ x ] well appearing, [  ] Agitated, [  ] Drowsy,  [  ] Lethargy, [  ] confused   HEAD:  [ x ] Normal, [  ] Other  EYES:  [ x ] EOMI, [  ] PERRLA, [ x ] conjunctiva and sclera clear normal, [  ] Other,  [  ] Pallor,[  ] Discharge  ENMT:  [x  ] Normal, [  ] Moist mucous membranes, [ x ] Good dentition, [ x ] No Thrush  NECK:  [ x ] Supple, [ x ] No JVD, [ x ] Normal thyroid, [  ] Lymphadenopathy [  ] Other  CHEST/LUNG:  [ x ] Clear to auscultation bilaterally, [ x ] Breath Sounds equal B/L  [  x] poor effort  [ x ] No rales, [ x ] No rhonchi  [ x ]  No wheezing,   HEART:  [ x ] Regular rate and rhythm, [  ] tachycardia, [  ] Bradycardia,  [  ] irregular  [ x ] No murmurs, No rubs, No gallops, [  ] PPM in place (Mfr:  )  ABDOMEN:  [ x ] Soft, [ x ] Nontender, [ x ] Nondistended, [x  ] No mass, [ x ] Bowel sounds present, [ x ] obese  NERVOUS SYSTEM:  [ x ] Alert & Oriented X 2, [ x ] Nonfocal  [  ] Confusion  [  ] Encephalopathic [  ] Sedated [  ] Unable to assess, [  ] Dementia [  ] Other-  EXTREMITIES: [x  ] 2+ Peripheral Pulses, No clubbing, No cyanosis,  [  ] edema B/L lower EXT. [  ] PVD stasis skin changes B/L Lower EXT, [  ] wound  LYMPH: No lymphadenopathy noted  SKIN:  [x  ] No rashes or lesions, [  ] Pressure Ulcers, [  ] ecchymosis, [  ] Skin Tears, [x  ] Other-Tattoos all 4 ext    DIET: Diet, Dysphagia 3 Soft-Thin Liquids:   Low Sodium (10-19-21 @ 09:33)      LABS:                        9.0    7.33  )-----------( 306      ( 22 Oct 2021 09:12 )             29.7     22 Oct 2021 09:12    142    |  109    |  38     ----------------------------<  136    3.5     |  25     |  2.60     Ca    8.8        22 Oct 2021 09:12    TPro  7.3    /  Alb  2.9    /  TBili  0.3    /  DBili  x      /  AST  15     /  ALT  21     /  AlkPhos  50     22 Oct 2021 09:12          Culture Results:   <10,000 CFU/mL Normal Urogenital Johnna (10-18 @ 18:35)  Culture Results:   No growth to date. (10-18 @ 16:04)  Culture Results:   No growth to date. (10-18 @ 16:04)          CARDIAC MARKERS ( 19 Oct 2021 02:22 )  .084 ng/mL / x     / x     / x     / x      CARDIAC MARKERS ( 18 Oct 2021 20:09 )  .086 ng/mL / x     / 90 U/L / x     / 1.7 ng/mL  CARDIAC MARKERS ( 18 Oct 2021 11:42 )  .059 ng/mL / x     / x     / x     / x              Culture - Urine (collected 18 Oct 2021 18:35)  Source: Clean Catch Clean Catch (Midstream)  Final Report (19 Oct 2021 14:17):    <10,000 CFU/mL Normal Urogenital Johnna    Culture - Blood (collected 18 Oct 2021 16:04)  Source: .Blood Blood-Peripheral  Preliminary Report (19 Oct 2021 17:02):    No growth to date.    Culture - Blood (collected 18 Oct 2021 16:04)  Source: .Blood Blood-Peripheral  Preliminary Report (19 Oct 2021 17:02):    No growth to date.         Anemia Panel:      Thyroid Panel:            Serum Pro-Brain Natriuretic Peptide: 64915 pg/mL (10-18-21 @ 11:42)      RADIOLOGY & ADDITIONAL TESTS:      HEALTH ISSUES - PROBLEM Dx:  Acute exacerbation of CHF (congestive heart failure)    Hypertensive urgency    HLD (hyperlipidemia)    CAD (coronary artery disease)  s/p stents (not on plavix)    Type 2 diabetes mellitus  not on home insulin    Atrial fibrillation  first documented on EKG 10/7/2021    Depression with anxiety    Pneumonia    DVT prophylaxis    Stage 3 chronic kidney disease    Anemia            Consultant(s) Notes Reviewed:  [  ] YES     Care Discussed with [X] Consultants  [  ] Patient  [  ] Family [  ] HCP [  ]   [  ] Social Service  [  ] RN, [  ] Physical Therapy,[  ] Palliative care team  DVT PPX: [  ] Lovenox, [  ] S C Heparin, [  ] Coumadin, [  ] Xarelto, [  ] Eliquis, [  ] Pradaxa, [  ] IV Heparin drip, [  ] SCD [  ] Contraindication 2 to GI Bleed,[  ] Ambulation [  ] Contraindicated 2 to  bleed [  ] Contraindicated 2 to Brain Bleed  Advanced directive: [  ] None, [  ] DNR/DNI Patient is a 76y old  Male who presents with a chief complaint of Hypertensive urgency  (21 Oct 2021 13:26)    HPI:  77 yo M with PMHx of Type 2 DM (not on insulin), BPH, CAD s/p stents >4 years ago (not on plavix), diverticulitis (hospitalized 07/2020 at [facility]), GIB 2/2 diverticulosis (2020), anxiety, Lupus, HTN, HLD, CKD stage 3, HepC, 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 Patient states his SOB started on Sunday and has been worse with laying down. Does admit to intermittent CP associated with the SOB and also when getting imaging as that makes him anxious. Does not appear to have a clear understanding of his medical conditions. Also, states he had some bleeding in his mouth on Sunday, but there is no longer bleeding; on exam there is a healing cut on his tongue and inner right cheek with no active bleeding likely from the pt biting his tongue. Admits difficulty laying down straight due to SOB. Denies fevers, chills, abd pain, n/v, sore throat, cough, palpitations  Of note, pt was recently admitted to [facility] on 10/7/21 for hypertensive urgency, Afib and  findings of PNA. was Rxed with IV Zosyn x 7 days  Abx .pt also got 1 u pRBC transfusion past week.    ED Course:   Vitals: BP: 210/96-->181/84, HR: 79, Temp: 98 F, RR: 17, SpO2: 100% on RA-->89% on RA-->98% on 2L NC   Labs: H/H: 9.6/30.6, aPTT: 43, PT/INR: 18.9/1.65, BUN/Cr: 28/2.2, alb: 3.1, GFR: 28, procal: .16, trop: .059, proBNP:10,846  Imaging:   CxR:  Bilateral lung parenchymal airspace opacities, increased from prior. Small pleural effusions suggested  B/l LE Dopplers: No evidence of deep venous thrombosis in either lower extremity.  Bilateral popliteal fossa fluid collections, likely Baker's cysts.  EKG: HR: 74, Atrial fibrillation, Incomplete right bundle branch block, Nonspecific T wave abnormality, Prolonged QT(461)  Received in the ED: Vanco and Zosyn x2, IV lasix 60mg, 10mg Hydralazine IVP x2 (18 Oct 2021 17:24)    INTERVAL HPI:  10/19/21: Patient was seen and examined at bedside. Denies any complaints. States his shortness of breath has improved.   10/20/21: Patient was seen and examined at bedside. States he was not able to sleep well because of another patient screaming across the room. Otherwise denies worsening SOB, chest pain, palpitations.  10/21/21: Patient was seen and examined at bedside. Patient comfortably having breakfast in bed, alert and awake. States he has no further complaints. Stable Labs Low H/H . Cr stable.  10/22/21: No acute events overnight. Patient seen and examined at bedside. Patient denies any fever, chills, chest pain, dyspnea, abdominal pain, leg pain/swelling, dysuria, constipation, diarhea. Needs TB screening. PPD to be placed.  COVID test prior to discharge stabel labs       OVERNIGHT EVENTS: none    Home Medications:  Lasix 40 mg oral tablet: 1 tab(s) orally once a day (18 Oct 2021 17:52)  Pepcid 20 mg oral tablet: 1 tab(s) orally every other day (18 Oct 2021 17:52)      MEDICATIONS  (STANDING):  amLODIPine   Tablet 10 milliGRAM(s) Oral daily  apixaban 5 milliGRAM(s) Oral every 12 hours  ARIPiprazole 30 milliGRAM(s) Oral daily  aspirin enteric coated 81 milliGRAM(s) Oral daily  atorvastatin 10 milliGRAM(s) Oral at bedtime  budesonide 160 MICROgram(s)/formoterol 4.5 MICROgram(s) Inhaler 2 Puff(s) Inhalation two times a day  cloNIDine 0.1 milliGRAM(s) Oral three times a day  cyanocobalamin 1000 MICROGram(s) Oral daily  doxazosin 8 milliGRAM(s) Oral at bedtime  famotidine    Tablet 20 milliGRAM(s) Oral daily  folic acid 1 milliGRAM(s) Oral daily  hydrALAZINE 100 milliGRAM(s) Oral three times a day  isosorbide   mononitrate ER Tablet (IMDUR) 30 milliGRAM(s) Oral daily  lamoTRIgine 100 milliGRAM(s) Oral daily  mirtazapine 30 milliGRAM(s) Oral at bedtime  NIFEdipine XL 60 milliGRAM(s) Oral daily  PPD  5 Tuberculin Unit(s) Injectable 5 Unit(s) IntraDermal once  venlafaxine XR. 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 No nausea, No vomiting; No diarrhea, No constipation. [x  ] BM  GENITOURINARY: No dysuria, No frequency, No urgency, No hematuria, NO incontinence  NEUROLOGICAL: No headaches, No dizziness, No numbness, No tingling, No tremors, No weakness  EXT: No Swelling, No Pain, No Edema  SKIN:  [ x ] No itching, burning, rashes, or lesions   MUSCULOSKELETAL: No joint pain ,No Jt swelling; No muscle pain, No back pain, No extremity pain  PSYCHIATRIC: No depression,  No anxiety,  No mood swings ,No difficulty sleeping at night  PAIN SCALE: [ x ] None  [  ] Other-  ROS Unable to obtain due to - [  ] Dementia  [  ] Lethargy [  ] Drowsy [  ] Sedated [  ] non verbal  REST OF REVIEW Of SYSTEM - [ x ] Normal       Vital Signs Last 24 Hrs  T(C): 36.6 (22 Oct 2021 11:46), Max: 37.1 (21 Oct 2021 19:31)  T(F): 97.8 (22 Oct 2021 11:46), Max: 98.8 (21 Oct 2021 19:31)  HR: 74 (22 Oct 2021 11:46) (68 - 74)  BP: 165/72 (22 Oct 2021 11:46) (164/81 - 167/77)  BP(mean): --  RR: 18 (22 Oct 2021 11:46) (17 - 18)  SpO2: 93% (22 Oct 2021 11:46) (91% - 94%)  Finger Stick        10-21 @ 07:01  -  10-22 @ 07:00  --------------------------------------------------------  IN: 0 mL / OUT: 600 mL / NET: -600 mL        PHYSICAL EXAM:  GENERAL:  [ x ] NAD , [ x ] well appearing, [  ] Agitated, [  ] Drowsy,  [  ] Lethargy, [  ] confused   HEAD:  [ x ] Normal, [  ] Other  EYES:  [ x ] EOMI, [  ] PERRLA, [ x ] conjunctiva and sclera clear normal, [  ] Other,  [  ] Pallor,[  ] Discharge  ENMT:  [x  ] Normal, [x  ] Moist mucous membranes, [ x ] Good dentition, [ x ] No Thrush  NECK:  [ x ] Supple, [ x ] No JVD, [ x ] Normal thyroid, [  ] Lymphadenopathy [  ] Other  CHEST/LUNG:  [ x ] Clear to auscultation bilaterally, [ x ] Breath Sounds equal B/L  [  x] poor effort  [ x ] No rales, [ x ] No rhonchi  [ x ]  No wheezing,   HEART:  [ x ] Regular rate and rhythm, [  ] tachycardia, [  ] Bradycardia,  [  ] irregular  [ x ] No murmurs, No rubs, No gallops, [  ] PPM in place (Mfr:  )  ABDOMEN:  [ x ] Soft, [ x ] Nontender, [ x ] Nondistended, [x  ] No mass, [ x ] Bowel sounds present, [ x ] obese  NERVOUS SYSTEM:  [ x ] Alert & Oriented X 2, [ x ] Nonfocal  [  ] Confusion  [  ] Encephalopathic [  ] Sedated [  ] Unable to assess, [  ] Dementia [  ] Other-  EXTREMITIES: [x  ] 2+ Peripheral Pulses, No clubbing, No cyanosis,  [ x ] pitting 2 + edema B/L lower EXT- Improving . [  ] PVD stasis skin changes B/L Lower EXT, [  ] wound  LYMPH: No lymphadenopathy noted  SKIN:  [x  ] No rashes or lesions, [  ] Pressure Ulcers, [  ] ecchymosis, [  ] Skin Tears, [x  ] Other-Tattoos all 4 ext    DIET: Diet, Dysphagia 3 Soft-Thin Liquids:   Low Sodium (10-19-21 @ 09:33)      LABS:                        9.0    7.33  )-----------( 306      ( 22 Oct 2021 09:12 )             29.7     22 Oct 2021 09:12    142    |  109    |  38     ----------------------------<  136    3.5     |  25     |  2.60     Ca    8.8        22 Oct 2021 09:12    TPro  7.3    /  Alb  2.9    /  TBili  0.3    /  DBili  x      /  AST  15     /  ALT  21     /  AlkPhos  50     22 Oct 2021 09:12          Culture Results:   <10,000 CFU/mL Normal Urogenital Johnna (10-18 @ 18:35)  Culture Results:   No growth to date. (10-18 @ 16:04)  Culture Results:   No growth to date. (10-18 @ 16:04)          CARDIAC MARKERS ( 19 Oct 2021 02:22 )  .084 ng/mL / x     / x     / x     / x      CARDIAC MARKERS ( 18 Oct 2021 20:09 )  .086 ng/mL / x     / 90 U/L / x     / 1.7 ng/mL  CARDIAC MARKERS ( 18 Oct 2021 11:42 )  .059 ng/mL / x     / x     / x     / x              Culture - Urine (collected 18 Oct 2021 18:35)  Source: Clean Catch Clean Catch (Midstream)  Final Report (19 Oct 2021 14:17):    <10,000 CFU/mL Normal Urogenital Johnna    Culture - Blood (collected 18 Oct 2021 16:04)  Source: .Blood Blood-Peripheral  Preliminary Report (19 Oct 2021 17:02):    No growth to date.    Culture - Blood (collected 18 Oct 2021 16:04)  Source: .Blood Blood-Peripheral  Preliminary Report (19 Oct 2021 17:02):    No growth to date.         Anemia Panel:      Thyroid Panel:            Serum Pro-Brain Natriuretic Peptide: 44468 pg/mL (10-18-21 @ 11:42)      RADIOLOGY & ADDITIONAL TESTS:      HEALTH ISSUES - PROBLEM Dx:  Acute exacerbation of CHF (congestive heart failure)    Hypertensive urgency    HLD (hyperlipidemia)    CAD (coronary artery disease)  s/p stents (not on plavix)    Type 2 diabetes mellitus  not on home insulin    Atrial fibrillation  first documented on EKG 10/7/2021    Depression with anxiety    Pneumonia    DVT prophylaxis    Stage 3 chronic kidney disease    Anemia            Consultant(s) Notes Reviewed:  [x  ] YES     Care Discussed with [X] Consultants  [x  ] Patient  [  ] Family [  ] HCP [  ]   [x  ] Social Service  [ x ] RN, [  ] Physical Therapy,[  ] Palliative care team  DVT PPX: [  ] Lovenox, [  ] S C Heparin, [  ] Coumadin, [  ] Xarelto, [ x ] Eliquis, [  ] Pradaxa, [  ] IV Heparin drip, [  ] SCD [  ] Contraindication 2 to GI Bleed,[  ] Ambulation [  ] Contraindicated 2 to  bleed [  ] Contraindicated 2 to Brain Bleed  Advanced directive: [ x ] None, [  ] DNR/DNI

## 2021-10-22 NOTE — PROGRESS NOTE ADULT - SUBJECTIVE AND OBJECTIVE BOX
Date/Time Patient Seen:  		  Referring MD:   Data Reviewed	       Patient is a 76y old  Male who presents with a chief complaint of Hypertensive urgency  (21 Oct 2021 13:26)      Subjective/HPI     PAST MEDICAL & SURGICAL HISTORY:  Hypertension    Diabetes mellitus    Lupus    Hepatitis C    Anxiety and depression    CAD (coronary artery disease)  s/p stents    Diverticulosis    Hyperlipidemia    HTN (hypertension)  c/b multiple episodes of hypertensive urgency    HLD (hyperlipidemia)    Atrial fibrillation  first documented on EKG 10/7/2021    CAD (coronary artery disease)  s/p stents (not on plavix)    Type 2 diabetes mellitus  not on home insulin/ Meds    Anxiety  Depression  multiple psych medications    History of diverticulitis  07/2021    Diverticulosis  c/b GIB in 2020    Afib  on AC    Stage 3 chronic kidney disease    Anemia of chronic disease  Monoclonal Gammopathy-MGUS  pRBC transfusion 10/15/21    Chronic diastolic congestive heart failure    Multiple thyroid nodules    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  Had surgery ( April 2013 )    S/P tonsillectomy    S/P arthroscopic knee surgery  Bilateral ( 2005 )    Torsion of testicle  Had surgery at age 14    Pilonidal cyst  Had surgery ( 1969 )    S/P cataract surgery  Bilateral    H/O hernia repair          Medication list         MEDICATIONS  (STANDING):  amLODIPine   Tablet 10 milliGRAM(s) Oral daily  apixaban 5 milliGRAM(s) Oral every 12 hours  ARIPiprazole 30 milliGRAM(s) Oral daily  aspirin enteric coated 81 milliGRAM(s) Oral daily  atorvastatin 10 milliGRAM(s) Oral at bedtime  budesonide 160 MICROgram(s)/formoterol 4.5 MICROgram(s) Inhaler 2 Puff(s) Inhalation two times a day  cloNIDine 0.1 milliGRAM(s) Oral three times a day  cyanocobalamin 1000 MICROGram(s) Oral daily  doxazosin 8 milliGRAM(s) Oral at bedtime  famotidine    Tablet 20 milliGRAM(s) Oral daily  folic acid 1 milliGRAM(s) Oral daily  furosemide   Injectable 60 milliGRAM(s) IV Push every 12 hours  hydrALAZINE 100 milliGRAM(s) Oral three times a day  isosorbide   mononitrate ER Tablet (IMDUR) 30 milliGRAM(s) Oral daily  lamoTRIgine 100 milliGRAM(s) Oral daily  mirtazapine 30 milliGRAM(s) Oral at bedtime  NIFEdipine XL 60 milliGRAM(s) Oral daily  venlafaxine XR. 150 milliGRAM(s) Oral daily    MEDICATIONS  (PRN):         Vitals log        ICU Vital Signs Last 24 Hrs  T(C): 36.3 (22 Oct 2021 04:43), Max: 37.1 (21 Oct 2021 19:31)  T(F): 97.3 (22 Oct 2021 04:43), Max: 98.8 (21 Oct 2021 19:31)  HR: 72 (22 Oct 2021 04:43) (68 - 72)  BP: 167/77 (22 Oct 2021 04:43) (144/71 - 167/77)  BP(mean): --  ABP: --  ABP(mean): --  RR: 18 (22 Oct 2021 04:43) (17 - 18)  SpO2: 94% (22 Oct 2021 04:43) (91% - 94%)           Input and Output:  I&O's Detail    20 Oct 2021 07:01  -  21 Oct 2021 07:00  --------------------------------------------------------  IN:    Oral Fluid: 480 mL  Total IN: 480 mL    OUT:    Voided (mL): 2250 mL  Total OUT: 2250 mL    Total NET: -1770 mL      21 Oct 2021 07:01  -  22 Oct 2021 05:51  --------------------------------------------------------  IN:  Total IN: 0 mL    OUT:    Voided (mL): 600 mL  Total OUT: 600 mL    Total NET: -600 mL          Lab Data                        9.5    6.10  )-----------( 329      ( 21 Oct 2021 07:01 )             31.0     10-21    140  |  108  |  39<H>  ----------------------------<  112<H>  3.5   |  25  |  2.60<H>    Ca    8.7      21 Oct 2021 07:01    TPro  6.9  /  Alb  2.7<L>  /  TBili  0.2  /  DBili  x   /  AST  12<L>  /  ALT  21  /  AlkPhos  49  10-21            Review of Systems	      Objective     Physical Examination    heart s1s2  lung dec BS  abd soft  head nc      Pertinent Lab findings & Imaging      Nikko:  NO   Adequate UO     I&O's Detail    20 Oct 2021 07:01  -  21 Oct 2021 07:00  --------------------------------------------------------  IN:    Oral Fluid: 480 mL  Total IN: 480 mL    OUT:    Voided (mL): 2250 mL  Total OUT: 2250 mL    Total NET: -1770 mL      21 Oct 2021 07:01  -  22 Oct 2021 05:51  --------------------------------------------------------  IN:  Total IN: 0 mL    OUT:    Voided (mL): 600 mL  Total OUT: 600 mL    Total NET: -600 mL               Discussed with:     Cultures:	        Radiology

## 2021-10-22 NOTE — PROGRESS NOTE ADULT - ASSESSMENT
CKD 4  Diabetes  Dyspnea, Fluid overload  Hypertension  h/o SLE  Hypokalemia  Anemia    Stable renal indices. On PO lasix. Retacrit for anemia. Monitor blood sugar levels. Insulin coverage as needed.   Monitor h/h trend. Potassium supplementation. Monitor BP trend. Titrate BP meds as needed. Salt restriction.   Will follow electrolytes and renal function trend. Avoid nephrotoxic meds as possible.   D/w patient regarding need for out patient nephrology follow up. D/c planning.

## 2021-10-22 NOTE — PROGRESS NOTE ADULT - ATTENDING COMMENTS
77 yo M with PMHx of Type 2 DM (not on insulin), BPH, CAD s/p stents >4 years ago (not on plavix),  Chronic Diastolic CHF ,Anemia -MGUS , diverticulitis (hospitalized 07/2020 at [facility]), GIB 2/2 diverticulosis (2020), anxiety/ depression , Lupus, HTN, HLD, CKD stage 3, HepC, hx of blood clots in brain (s/p surgery 2013) living in a group home [facility] presenting to the ED with SOB. Pt was already Rxed for PNA with IV Zosyn last week,  Admitted for ac on Chronic Diastolic  CHF exacerbation and HTN urgency.  Pt seen, Examined, case & care plan d/w pt, residents at detail.  -Concern for NON Compliance with meds /supervision at group home.  Consults -  Pulmonary-Dr Day follow up  Cardio-DR MYRNA Rosario follow up , continue same meds   -DR Rodarte D/W - OK to change to PO Lasix 60 mg q 12 hrs  Psych-DR Winkler d/w at detail, patient appears to have limited insight into medication   AM Labs.  PT Eval.---> NO PT need  Social service Eval. d/w at detail, start D/C Plan .  Total care time is 45 minutes. 77 yo M with PMHx of Type 2 DM (not on insulin), BPH, CAD s/p stents >4 years ago (not on plavix),  Chronic Diastolic CHF ,Anemia -MGUS , diverticulitis (hospitalized 07/2020 at [facility]), GIB 2/2 diverticulosis (2020), anxiety/ depression , Lupus, HTN, HLD, CKD stage 3, HepC, hx of blood clots in brain (s/p surgery 2013) living in a group home [facility] presenting to the ED with SOB. Pt was already Rxed for PNA with IV Zosyn last week,  Admitted for ac on Chronic Diastolic  CHF exacerbation and HTN urgency.  Pt seen, Examined, case & care plan d/w pt, residents at detail.  -Concern for NON Compliance with meds /supervision at group home.  Consults -  Pulmonary-Dr Day follow up  Cardio-DR MYRNA Rosario follow up , continue same meds   -DR Rodarte D/W - OK to change to PO Lasix 60 mg q 12 hrs  Psych-DR Winkler d/w at detail, patient appears to have limited insight into medication   AM Labs.  PT Eval.---> NO PT need  Social service Eval. d/w at detail, start D/C Plan . PPD to be placed.  Total care time is 45 minutes.

## 2021-10-23 DIAGNOSIS — R77.8 OTHER SPECIFIED ABNORMALITIES OF PLASMA PROTEINS: ICD-10-CM

## 2021-10-23 LAB
ALBUMIN SERPL ELPH-MCNC: 2.8 G/DL — LOW (ref 3.3–5)
ALP SERPL-CCNC: 47 U/L — SIGNIFICANT CHANGE UP (ref 40–120)
ALT FLD-CCNC: 20 U/L — SIGNIFICANT CHANGE UP (ref 12–78)
ANION GAP SERPL CALC-SCNC: 7 MMOL/L — SIGNIFICANT CHANGE UP (ref 5–17)
AST SERPL-CCNC: 15 U/L — SIGNIFICANT CHANGE UP (ref 15–37)
BASOPHILS # BLD AUTO: 0.02 K/UL — SIGNIFICANT CHANGE UP (ref 0–0.2)
BASOPHILS NFR BLD AUTO: 0.3 % — SIGNIFICANT CHANGE UP (ref 0–2)
BILIRUB SERPL-MCNC: 0.2 MG/DL — SIGNIFICANT CHANGE UP (ref 0.2–1.2)
BUN SERPL-MCNC: 39 MG/DL — HIGH (ref 7–23)
CALCIUM SERPL-MCNC: 8.8 MG/DL — SIGNIFICANT CHANGE UP (ref 8.5–10.1)
CHLORIDE SERPL-SCNC: 112 MMOL/L — HIGH (ref 96–108)
CK MB BLD-MCNC: 3.5 % — SIGNIFICANT CHANGE UP (ref 0–3.5)
CK MB BLD-MCNC: 3.6 % — HIGH (ref 0–3.5)
CK MB CFR SERPL CALC: 1.4 NG/ML — SIGNIFICANT CHANGE UP (ref 0–3.6)
CK MB CFR SERPL CALC: 1.6 NG/ML — SIGNIFICANT CHANGE UP (ref 0–3.6)
CK SERPL-CCNC: 40 U/L — SIGNIFICANT CHANGE UP (ref 26–308)
CK SERPL-CCNC: 44 U/L — SIGNIFICANT CHANGE UP (ref 26–308)
CO2 SERPL-SCNC: 24 MMOL/L — SIGNIFICANT CHANGE UP (ref 22–31)
CREAT SERPL-MCNC: 2.4 MG/DL — HIGH (ref 0.5–1.3)
CULTURE RESULTS: SIGNIFICANT CHANGE UP
CULTURE RESULTS: SIGNIFICANT CHANGE UP
EOSINOPHIL # BLD AUTO: 0.2 K/UL — SIGNIFICANT CHANGE UP (ref 0–0.5)
EOSINOPHIL NFR BLD AUTO: 3.3 % — SIGNIFICANT CHANGE UP (ref 0–6)
GLUCOSE SERPL-MCNC: 101 MG/DL — HIGH (ref 70–99)
HCT VFR BLD CALC: 28 % — LOW (ref 39–50)
HGB BLD-MCNC: 8.5 G/DL — LOW (ref 13–17)
IMM GRANULOCYTES NFR BLD AUTO: 0.3 % — SIGNIFICANT CHANGE UP (ref 0–1.5)
LYMPHOCYTES # BLD AUTO: 0.95 K/UL — LOW (ref 1–3.3)
LYMPHOCYTES # BLD AUTO: 15.9 % — SIGNIFICANT CHANGE UP (ref 13–44)
MCHC RBC-ENTMCNC: 26.2 PG — LOW (ref 27–34)
MCHC RBC-ENTMCNC: 30.4 GM/DL — LOW (ref 32–36)
MCV RBC AUTO: 86.2 FL — SIGNIFICANT CHANGE UP (ref 80–100)
MONOCYTES # BLD AUTO: 0.32 K/UL — SIGNIFICANT CHANGE UP (ref 0–0.9)
MONOCYTES NFR BLD AUTO: 5.4 % — SIGNIFICANT CHANGE UP (ref 2–14)
NEUTROPHILS # BLD AUTO: 4.47 K/UL — SIGNIFICANT CHANGE UP (ref 1.8–7.4)
NEUTROPHILS NFR BLD AUTO: 74.8 % — SIGNIFICANT CHANGE UP (ref 43–77)
NRBC # BLD: 0 /100 WBCS — SIGNIFICANT CHANGE UP (ref 0–0)
PLATELET # BLD AUTO: 273 K/UL — SIGNIFICANT CHANGE UP (ref 150–400)
POTASSIUM SERPL-MCNC: 3.8 MMOL/L — SIGNIFICANT CHANGE UP (ref 3.5–5.3)
POTASSIUM SERPL-SCNC: 3.8 MMOL/L — SIGNIFICANT CHANGE UP (ref 3.5–5.3)
PROT SERPL-MCNC: 6.8 G/DL — SIGNIFICANT CHANGE UP (ref 6–8.3)
RBC # BLD: 3.25 M/UL — LOW (ref 4.2–5.8)
RBC # FLD: 17.2 % — HIGH (ref 10.3–14.5)
SODIUM SERPL-SCNC: 143 MMOL/L — SIGNIFICANT CHANGE UP (ref 135–145)
SPECIMEN SOURCE: SIGNIFICANT CHANGE UP
SPECIMEN SOURCE: SIGNIFICANT CHANGE UP
TROPONIN I SERPL-MCNC: 0.02 NG/ML — SIGNIFICANT CHANGE UP (ref 0.01–0.04)
TROPONIN I SERPL-MCNC: 0.03 NG/ML — SIGNIFICANT CHANGE UP (ref 0.01–0.04)
WBC # BLD: 5.98 K/UL — SIGNIFICANT CHANGE UP (ref 3.8–10.5)
WBC # FLD AUTO: 5.98 K/UL — SIGNIFICANT CHANGE UP (ref 3.8–10.5)

## 2021-10-23 PROCEDURE — 99232 SBSQ HOSP IP/OBS MODERATE 35: CPT

## 2021-10-23 PROCEDURE — 93010 ELECTROCARDIOGRAM REPORT: CPT

## 2021-10-23 RX ORDER — DILTIAZEM HCL 120 MG
2.5 CAPSULE, EXT RELEASE 24 HR ORAL ONCE
Refills: 0 | Status: COMPLETED | OUTPATIENT
Start: 2021-10-23 | End: 2021-10-23

## 2021-10-23 RX ADMIN — Medication 650 MILLIGRAM(S): at 04:47

## 2021-10-23 RX ADMIN — Medication 100 MILLIGRAM(S): at 21:30

## 2021-10-23 RX ADMIN — Medication 60 MILLIGRAM(S): at 17:16

## 2021-10-23 RX ADMIN — APIXABAN 5 MILLIGRAM(S): 2.5 TABLET, FILM COATED ORAL at 17:16

## 2021-10-23 RX ADMIN — Medication 60 MILLIGRAM(S): at 05:27

## 2021-10-23 RX ADMIN — Medication 150 MILLIGRAM(S): at 11:16

## 2021-10-23 RX ADMIN — Medication 2.5 MILLIGRAM(S): at 09:09

## 2021-10-23 RX ADMIN — BUDESONIDE AND FORMOTEROL FUMARATE DIHYDRATE 2 PUFF(S): 160; 4.5 AEROSOL RESPIRATORY (INHALATION) at 21:31

## 2021-10-23 RX ADMIN — LAMOTRIGINE 100 MILLIGRAM(S): 25 TABLET, ORALLY DISINTEGRATING ORAL at 11:12

## 2021-10-23 RX ADMIN — Medication 0.1 MILLIGRAM(S): at 05:27

## 2021-10-23 RX ADMIN — Medication 100 MILLIGRAM(S): at 05:28

## 2021-10-23 RX ADMIN — Medication 1 MILLIGRAM(S): at 11:12

## 2021-10-23 RX ADMIN — MIRTAZAPINE 30 MILLIGRAM(S): 45 TABLET, ORALLY DISINTEGRATING ORAL at 21:30

## 2021-10-23 RX ADMIN — ISOSORBIDE MONONITRATE 30 MILLIGRAM(S): 60 TABLET, EXTENDED RELEASE ORAL at 11:14

## 2021-10-23 RX ADMIN — Medication 100 MILLIGRAM(S): at 13:36

## 2021-10-23 RX ADMIN — ATORVASTATIN CALCIUM 10 MILLIGRAM(S): 80 TABLET, FILM COATED ORAL at 21:32

## 2021-10-23 RX ADMIN — PREGABALIN 1000 MICROGRAM(S): 225 CAPSULE ORAL at 11:14

## 2021-10-23 RX ADMIN — Medication 0.1 MILLIGRAM(S): at 11:12

## 2021-10-23 RX ADMIN — FAMOTIDINE 20 MILLIGRAM(S): 10 INJECTION INTRAVENOUS at 11:12

## 2021-10-23 RX ADMIN — ARIPIPRAZOLE 30 MILLIGRAM(S): 15 TABLET ORAL at 11:12

## 2021-10-23 RX ADMIN — Medication 8 MILLIGRAM(S): at 21:30

## 2021-10-23 RX ADMIN — Medication 0.1 MILLIGRAM(S): at 21:30

## 2021-10-23 RX ADMIN — BUDESONIDE AND FORMOTEROL FUMARATE DIHYDRATE 2 PUFF(S): 160; 4.5 AEROSOL RESPIRATORY (INHALATION) at 06:34

## 2021-10-23 RX ADMIN — APIXABAN 5 MILLIGRAM(S): 2.5 TABLET, FILM COATED ORAL at 05:27

## 2021-10-23 RX ADMIN — AMLODIPINE BESYLATE 10 MILLIGRAM(S): 2.5 TABLET ORAL at 05:28

## 2021-10-23 RX ADMIN — Medication 81 MILLIGRAM(S): at 11:12

## 2021-10-23 NOTE — PROGRESS NOTE ADULT - PROBLEM SELECTOR PLAN 3
- Pt was recent admitted to [facility] from 10/7-10/16 and treated for PNA with IV Zosyn & Augmentin x 7 days   - Although CxR shows bilateral lung parenchymal airspace opacities, increased from prior pt does not have leukocytosis or fever and it is suspected SOB is more so 2/2 CHF exacerbation. CxR findings are suspected to be from previously treated PNA  - s/p vanc and zosyn in ED. will hold off on further Abx at this time.as per Pulmonary Dr Day   - F/u Cultures: pending results, Nl Lactate   - official S&S: dysphagis 3, soft solids   - Pulm, Dr. Day,- Keep Off Abx , glorialey Old PNA   Continue Symbicort 2x day.

## 2021-10-23 NOTE — PROGRESS NOTE ADULT - SUBJECTIVE AND OBJECTIVE BOX
Patient is a 76y old  Male who presents with a chief complaint of shortness of breath (19 Oct 2021 09:10)  Patient seen in follow up for CKD 4, fluid overload.        PAST MEDICAL HISTORY:  Hypertension    Diabetes mellitus    Lupus    Hepatitis C    Anxiety and depression    CAD (coronary artery disease)    Diverticulosis    Hyperlipidemia    HTN (hypertension)    HLD (hyperlipidemia)    Atrial fibrillation    CAD (coronary artery disease)    Type 2 diabetes mellitus    Anxiety    History of diverticulitis    Diverticulosis    Afib    Stage 3 chronic kidney disease    Anemia of chronic disease    Chronic diastolic congestive heart failure    Multiple thyroid nodules      MEDICATIONS  (STANDING):  amLODIPine   Tablet 10 milliGRAM(s) Oral daily  apixaban 5 milliGRAM(s) Oral every 12 hours  ARIPiprazole 30 milliGRAM(s) Oral daily  aspirin enteric coated 81 milliGRAM(s) Oral daily  atorvastatin 10 milliGRAM(s) Oral at bedtime  budesonide 160 MICROgram(s)/formoterol 4.5 MICROgram(s) Inhaler 2 Puff(s) Inhalation two times a day  cloNIDine 0.1 milliGRAM(s) Oral three times a day  cyanocobalamin 1000 MICROGram(s) Oral daily  diltiazem Injectable 2.5 milliGRAM(s) IV Push once  doxazosin 8 milliGRAM(s) Oral at bedtime  famotidine    Tablet 20 milliGRAM(s) Oral daily  folic acid 1 milliGRAM(s) Oral daily  furosemide    Tablet 60 milliGRAM(s) Oral two times a day  hydrALAZINE 100 milliGRAM(s) Oral three times a day  isosorbide   mononitrate ER Tablet (IMDUR) 30 milliGRAM(s) Oral daily  lamoTRIgine 100 milliGRAM(s) Oral daily  mirtazapine 30 milliGRAM(s) Oral at bedtime  NIFEdipine XL 60 milliGRAM(s) Oral daily  venlafaxine XR. 150 milliGRAM(s) Oral daily            T(C): 36.7 (23 Oct 2021 04:21), Max: 37 (22 Oct 2021 20:10)  T(F): 98 (23 Oct 2021 04:21), Max: 98.6 (22 Oct 2021 20:10)  HR: 78 (23 Oct 2021 08:34) (67 - 78)  BP: 139/65 (23 Oct 2021 08:34) (139/65 - 180/89)  BP(mean): --  RR: 19 (23 Oct 2021 08:34) (18 - 19)  SpO2: 92% (23 Oct 2021 08:34) (91% - 95%)  Finger Stick    PHYSICAL EXAM:  General: no distress  Respiratory: b/l air entry  Cardiovascular: S1 S2  Gastrointestinal: soft  Extremities: + edema      LABS:                        8.5    5.98  )-----------( 273      ( 23 Oct 2021 07:09 )             28.0     23 Oct 2021 07:09    143    |  112    |  39     ----------------------------<  101    3.8     |  24     |  2.40     Ca    8.8        23 Oct 2021 07:09    TPro  6.8    /  Alb  2.8    /  TBili  0.2    /  DBili  x      /  AST  15     /  ALT  20     /  AlkPhos  47     23 Oct 2021 07:09

## 2021-10-23 NOTE — PROGRESS NOTE ADULT - ATTENDING COMMENTS
Seen/examined. agree with above.  af this morning, and some chest pain  now rate controlled, on ac  volume improved and transition to po lasix

## 2021-10-23 NOTE — PROGRESS NOTE ADULT - ATTENDING COMMENTS
75 yo M with PMHx of Type 2 DM (not on insulin), BPH, CAD s/p stents >4 years ago (not on plavix),  Chronic Diastolic CHF ,Anemia -MGUS , diverticulitis (hospitalized 07/2020 at [facility]), GIB 2/2 diverticulosis (2020), anxiety/ depression , Lupus, HTN, HLD, CKD stage 3, HepC, hx of blood clots in brain (s/p surgery 2013) living in a group home [facility] presenting to the ED with SOB. Pt was already Rxed for PNA with IV Zosyn last week,  Admitted for ac on Chronic Diastolic  CHF exacerbation and HTN urgency.  Pt seen, Examined, case & care plan d/w pt, residents at detail.  -Concern for NON Compliance with meds /supervision at [facility].  Pulmonary-Dr Day follow up  Cardio-DR Monterroso d/w , will review All BP meds  -DR Rodarte D/W - OK to change to PO Lasix 60 mg q 12 hrs  Psych-DR Winkler d/w at detail, patient appears to have limited insight into medication   AM Labs.  PT Eval.---> NO PT need  Social service Eval. d/w at detail, start D/C Plan . PPD  placed.  Total care time is 45 minutes.

## 2021-10-23 NOTE — PROGRESS NOTE ADULT - PROBLEM SELECTOR PLAN 1
- Pt presented with SOB worse with laying down and with LE edema Since Sunday  - CxR: Bilateral lung parenchymal airspace opacities, increased from prior. Small pleural effusions suggested  - B/l LE Dopplers: No evidence of deep venous thrombosis in either lower extremity.  - BNP: 12403  - TTE on 10/13/21(previous admission): LVEF 55-60%. LV diastolic function cannot determine due to atrial fibrillation. -- Do not need to repeat at time AC on Chronic Diastolic CHF   - supplemental O2 as needed   - s/p 60mg IVP  - cont Lasix 60mg PO BID  - am CT chest:   1. Small bilateral pleural effusions.  2. Multifocal bilateral pneumonia, organism nonspecific, does not have the characteristic appearance of COVID-19 but is within range of what can be seen with COVID-19 pneumonia.  - Monitor fluid status closely  - Cardio, Dr. Bliss's group, following.  - Pulm following- recommends monitoring off antibiotics at this time. - Pt presented with SOB worse with laying down and with LE edema Since Sunday  - CxR: Bilateral lung parenchymal airspace opacities, increased from prior. Small pleural effusions suggested  - B/l LE Dopplers: No evidence of deep venous thrombosis in either lower extremity.  - BNP: 38317  - TTE on 10/13/21(previous admission): LVEF 55-60%. LV diastolic function cannot determine due to atrial fibrillation. -- Do not need to repeat at time AC on Chronic Diastolic CHF   - supplemental O2 as needed   - s/p 60mg IVP-stop  - cont Lasix 60mg PO BID  - am CT chest:   1. Small bilateral pleural effusions.  2. Multifocal bilateral pneumonia, organism nonspecific, does not have the characteristic appearance of COVID-19 but is within range of what can be seen with COVID-19 pneumonia.  - Monitor fluid status closely  - Cardio, Dr. Bliss's group, following.  - Pulm following- recommends monitoring off antibiotics at this time.

## 2021-10-23 NOTE — PROGRESS NOTE ADULT - ASSESSMENT
75 yo M with PMHx of Type 2 DM (not on insulin), BPH, CAD s/p stents >4 years ago (not on plavix), diverticulitis (hospitalized 07/2020 at [facility]), GIB 2/2 diverticulosis (2020), anxiety, Lupus, HTN, HLD, CKD stage 3, HepC, hx of blood clots in brain (s/p surgery 2013) living in a group home [facility] house presenting to [facility] today with shortness of breath, chest pain that started upon discharge 2 days ago with complaints of oral bleed that started today. Cardiology consulted for SOB and chest pain    Pleural effusion  - CT chest showed small B/L pleural effusions and multifocal Pna s/p Abx.  No O2 requirement  - ECHO LVEF 55-60%. LV diastolic function cannot determine due to atrial fibrillation. Mild mitral regurgitation is present. Mild aortic stenosis is  present. Mild tricuspid regurgitation is present . No evidence of any pulmonary hypertension  - BNP: 38818 S/P Lasix IV 60mg Q12  - Appears compensated, Lasix transitioned to 60 mg po BID  - Daily weight,. Monitor Strict I/O's, net negative 3 liters  - Monitor and replete lytes, keep K>4, Mg>2.    Chest pain (Atypical)   - Patient with hx of CAD with stent placement  - Patient presented with chest discomfort.  Resolved  - EKG showed Afib, no ischemic changes noted  - Mild troponin leak likely in setting of ARIELLA, peaked no need to further trend  - No clinical evidence of ACS  - Continue with ASA  - Continue statin     Afib  - Rate-controlled   - Tele SR 66-80 overnight, can d/c tele  - Continue Eliquis  - Monitor electrolytes, replete to keep K>4 and Mag>2    Hypertensive Urgency   - On admission /90/ given hydralazine in ED 10 mg IV X2 doses  - BP stable: 139/65 (10-23-21 @ 08:34) (139/65 - 149/54)  - Continue clonidine 0.1 TID, Hydralazine 100 mg q 8 hrs, Amlodipine 10 mg qd, Nifedipine XL 60 mg, and Imdur 30mg   - Can increase Imdur to 60mg Qday if needed for BP elevation      - All other medical needs as per primary team.  - Other cardiovascular workup will depend on clinical course.  - Will continue to follow.    Bernice Arora, MS ANP, AGACNP  Nurse Practitioner- Cardiology   Spectra #[phone]   75 yo M with PMHx of Type 2 DM (not on insulin), BPH, CAD s/p stents >4 years ago (not on plavix), diverticulitis (hospitalized 07/2020 at [facility]), GIB 2/2 diverticulosis (2020), anxiety, Lupus, HTN, HLD, CKD stage 3, HepC, hx of blood clots in brain (s/p surgery 2013) living in a group home [facility] house presenting to [facility] today with shortness of breath, chest pain that started upon discharge 2 days ago with complaints of oral bleed that started today. Cardiology consulted for SOB and chest pain    Pleural effusion  - CT chest showed small B/L pleural effusions and multifocal Pna s/p Abx.  No O2 requirement  - ECHO LVEF 55-60%. LV diastolic function cannot determine due to atrial fibrillation. Mild mitral regurgitation is present. Mild aortic stenosis is  present. Mild tricuspid regurgitation is present . No evidence of any pulmonary hypertension  - BNP: 28701 S/P Lasix IV 60mg Q12  - Appears compensated, Lasix transitioned to 60 mg po BID  - Daily weight,. Monitor Strict I/O's, net negative 3 liters  - Monitor and replete lytes, keep K>4, Mg>2.    Chest pain (Atypical)   - Patient with hx of CAD with stent placement  - Patient presented with chest discomfort.  Resolved  - EKG showed Afib, no ischemic changes noted  - Mild troponin leak likely in setting of ARIELLA, peaked no need to further trend  - No clinical evidence of ACS  - Continue with ASA  - Continue statin     Afib  - Rate-controlled   - Continue Eliquis  - Monitor electrolytes, replete to keep K>4 and Mag>2    Hypertensive Urgency   - On admission /90/ given hydralazine in ED 10 mg IV X2 doses  - BP stable: 139/65 (10-23-21 @ 08:34) (139/65 - 149/54)  - Continue clonidine 0.1 TID, Hydralazine 100 mg q 8 hrs, Amlodipine 10 mg qd, Nifedipine XL 60 mg, and Imdur 30mg   - Can increase Imdur to 60mg Qday if needed for BP elevation    - All other medical needs as per primary team.  - Other cardiovascular workup will depend on clinical course.  - Will continue to follow.    Bernice Arora, MS ANP, AGACNP  Nurse Practitioner- Cardiology   Spectra #[phone]

## 2021-10-23 NOTE — PROGRESS NOTE ADULT - ASSESSMENT
CKD 4  Diabetes  Dyspnea, Fluid overload  Hypertension  h/o SLE  Hypokalemia  Anemia    Stable renal indices. On PO lasix. Retacrit for anemia. Monitor blood sugar levels. Insulin coverage as needed.   Monitor h/h trend. Potassium supplementation. Monitor BP trend. Titrate BP meds as needed. Salt restriction.   Will follow electrolytes and renal function trend. Avoid nephrotoxic meds as possible. D/c planning.

## 2021-10-23 NOTE — PROGRESS NOTE ADULT - ASSESSMENT
75 yo M with PMHx of Type 2 DM (not on insulin), BPH, CAD s/p stents >4 years ago (not on plavix),  Chronic Diastolic CHF ,Anemia -MGUS ,Thyroid Nodule  diverticulitis (hospitalized 07/2020 at [facility]), GIB 2/2 diverticulosis (2020), anxiety/ depression , Lupus, HTN, HLD, CKD stage 3, HepC, hx of blood clots in brain (s/p surgery 2013) living in a group home [facility] house admitted for CHF exacerbation and HTN urgency.

## 2021-10-23 NOTE — PROGRESS NOTE ADULT - SUBJECTIVE AND OBJECTIVE BOX
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 with [facility]. Patient states his SOB started on Sunday and has been worse with laying down. Does admit to intermittent CP associated with the SOB and also when getting imaging as that makes him anxious. Does not appear to have a clear understanding of his medical conditions. Also, states he had some bleeding in his mouth on Sunday, but there is no longer bleeding; on exam there is a healing cut on his tongue and inner right cheek with no active bleeding likely from the pt biting his tongue. Admits difficulty laying down straight due to SOB. Denies fevers, chills, abd pain, n/v, sore throat, cough, palpitations  Of note, pt was recently admitted to [facility] on 10/7/21 for hypertensive urgency, Afib and  findings of PNA. was Rxed with IV Zosyn x 7 days  Abx .pt also got 1 u pRBC transfusion past week.    ED Course:   Vitals: BP: 210/96-->181/84, HR: 79, Temp: 98 F, RR: 17, SpO2: 100% on RA-->89% on RA-->98% on 2L NC   Labs: H/H: 9.6/30.6, aPTT: 43, PT/INR: 18.9/1.65, BUN/Cr: 28/2.2, alb: 3.1, GFR: 28, procal: .16, trop: .059, proBNP:10,846  Imaging:   CxR:  Bilateral lung parenchymal airspace opacities, increased from prior. Small pleural effusions suggested  B/l LE Dopplers: No evidence of deep venous thrombosis in either lower extremity.  Bilateral popliteal fossa fluid collections, likely Baker's cysts.  EKG: HR: 74, Atrial fibrillation, Incomplete right bundle branch block, Nonspecific T wave abnormality, Prolonged QT(461)  Received in the ED: Vanco and Zosyn x2, IV lasix 60mg, 10mg Hydralazine IVP x2 (18 Oct 2021 17:24)    INTERVAL HPI:  10/19/21: Patient was seen and examined at bedside. Denies any complaints. States his shortness of breath has improved.   10/20/21: Patient was seen and examined at bedside. States he was not able to sleep well because of another patient screaming across the room. Otherwise denies worsening SOB, chest pain, palpitations.  10/21/21: Patient was seen and examined at bedside. Patient comfortably having breakfast in bed, alert and awake. States he has no further complaints. Stable Labs Low H/H . Cr stable.  10/22/21: No acute events overnight. Patient seen and examined at bedside. Patient denies any fever, chills, chest pain, dyspnea, abdominal pain, leg pain/swelling, dysuria, constipation, diarhea. Needs TB screening. PPD to be placed.  COVID test prior to discharge stabel labs   10/23/21: Patient was seen and examined at bedside. No acute events overnight. Patient complaining of atypical chest pain and palpitations. STAT ekg ordered- unchanged from prior. Ordered cardiac enzymes- will follow up. Ordered Cardizem 2.5mg x 1.     OVERNIGHT EVENTS: No acute overnight events.     Home Medications:  Lasix 40 mg oral tablet: 1 tab(s) orally once a day (18 Oct 2021 17:52)  Pepcid 20 mg oral tablet: 1 tab(s) orally every other day (18 Oct 2021 17:52)      MEDICATIONS  (STANDING):  amLODIPine   Tablet 10 milliGRAM(s) Oral daily  apixaban 5 milliGRAM(s) Oral every 12 hours  ARIPiprazole 30 milliGRAM(s) Oral daily  aspirin enteric coated 81 milliGRAM(s) Oral daily  atorvastatin 10 milliGRAM(s) Oral at bedtime  budesonide 160 MICROgram(s)/formoterol 4.5 MICROgram(s) Inhaler 2 Puff(s) Inhalation two times a day  cloNIDine 0.1 milliGRAM(s) Oral three times a day  cyanocobalamin 1000 MICROGram(s) Oral daily  diltiazem Injectable 2.5 milliGRAM(s) IV Push once  doxazosin 8 milliGRAM(s) Oral at bedtime  famotidine    Tablet 20 milliGRAM(s) Oral daily  folic acid 1 milliGRAM(s) Oral daily  furosemide    Tablet 60 milliGRAM(s) Oral two times a day  hydrALAZINE 100 milliGRAM(s) Oral three times a day  isosorbide   mononitrate ER Tablet (IMDUR) 30 milliGRAM(s) Oral daily  lamoTRIgine 100 milliGRAM(s) Oral daily  mirtazapine 30 milliGRAM(s) Oral at bedtime  NIFEdipine XL 60 milliGRAM(s) Oral daily  venlafaxine XR. 150 milliGRAM(s) Oral daily    MEDICATIONS  (PRN):      Allergies    No Known Allergies    Intolerances        Social History:  Tobacco: quit in 1980, not active smoker  EtOH: denies   Recreational drug use: cocaine several years ago "a few times" has not used in "many years"  Lives with: [facility] house; group home; no nursing assistance; observation is there  Ambulates: independent, denies walker or cane but uses guard railings  ADLs: independent, but states need for assistance in bathing, cooking; independent with feeding, ambulating  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[place]  Vaccinations: Moderna x2 doses last dose in May (18 Oct 2021 17:24)      REVIEW OF SYSTEMS:  CONSTITUTIONAL: No fever, No chills, No fatigue, No myalgia, No Body ache, No Weakness  EYES: No eye pain,  No visual disturbances, No discharge, NO Redness  ENMT:  No ear pain, No nose bleed, No vertigo; No sinus pain, NO throat pain, No Congestion  NECK: No pain, No stiffness  RESPIRATORY: No cough, NO wheezing, No  hemoptysis, NO  shortness of breath  CARDIOVASCULAR: [x] chest pain, palpitations  GASTROINTESTINAL: [x] RUQ abdominal pain, NO epigastric pain. No nausea, No vomiting; No diarrhea, No constipation.   GENITOURINARY: No dysuria, No frequency, No urgency, No hematuria, NO incontinence  NEUROLOGICAL: No headaches, No dizziness, No numbness, No tingling, No tremors, No weakness  EXT: No Swelling, No Pain, No Edema  SKIN:  [ x ] No itching, burning, rashes, or lesions   MUSCULOSKELETAL: No joint pain ,No Jt swelling; No muscle pain, No back pain, No extremity pain  PSYCHIATRIC: No depression,  No anxiety,  No mood swings ,No difficulty sleeping at night  PAIN SCALE: [  ] None  [  ] Other-3-4/10  ROS Unable to obtain due to - [  ] Dementia  [  ] Lethargy [  ] Drowsy [  ] Sedated [  ] non verbal  REST OF REVIEW Of SYSTEM - [ x ] Normal     Vital Signs Last 24 Hrs  T(C): 36.7 (23 Oct 2021 04:21), Max: 37 (22 Oct 2021 20:10)  T(F): 98 (23 Oct 2021 04:21), Max: 98.6 (22 Oct 2021 20:10)  HR: 78 (23 Oct 2021 08:34) (67 - 78)  BP: 139/65 (23 Oct 2021 08:34) (139/65 - 180/89)  BP(mean): --  RR: 19 (23 Oct 2021 08:34) (18 - 19)  SpO2: 92% (23 Oct 2021 08:34) (91% - 95%)  Finger Stick          PHYSICAL EXAM:  GENERAL:  [ x ] NAD , [ x ] well appearing, [  ] Agitated, [  ] Drowsy,  [  ] Lethargy, [  ] confused   HEAD:  [x  ] Normal, [  ] Other  EYES:  [ x ] EOMI, [  ] PERRLA, [ x ] conjunctiva and sclera clear normal, [  ] Other,  [  ] Pallor,[  ] Discharge  ENMT:  [ x ] Normal, [  ] Moist mucous membranes, [  ] Good dentition, [  ] No Thrush  NECK:  [ x ] Supple, [ x ] No JVD, [  ] Normal thyroid, [  ] Lymphadenopathy [  ] Other  CHEST/LUNG:  [ x ] Clear to auscultation bilaterally, [ x ] Breath Sounds equal B/L  [  ] poor effort  [ x ] No rales, [ x ] No rhonchi  [ x ]  No wheezing,   HEART:  [ x ] Regular rate and rhythm, [  ] tachycardia, [  ] Bradycardia,  [  ] irregular  [ x ] No murmurs, No rubs, No gallops, [  ] PPM in place (Mfr:  )  ABDOMEN:  [ x ] Soft, [x] tender RUQ, [  ] Nondistended, [  ] No mass, [  ] Bowel sounds present, [  ] obese  NERVOUS SYSTEM:  [x  ] Alert & Oriented X3, [  ] Nonfocal  [  ] Confusion  [  ] Encephalopathic [  ] Sedated [  ] Unable to assess, [  ] Dementia [  ] Other-  EXTREMITIES: [ x ] 2+ Peripheral Pulses, No clubbing, No cyanosis,  [  ] edema B/L lower EXT. [  ] PVD stasis skin changes B/L Lower EXT, [  ] wound  LYMPH: No lymphadenopathy noted  SKIN:  [ x ] No rashes or lesions, [  ] Pressure Ulcers, [  ] ecchymosis, [  ] Skin Tears, [  ] Other    DIET: Diet, Dysphagia 3 Soft-Thin Liquids:   Low Sodium (10-19-21 @ 09:33)      LABS:                        8.5    5.98  )-----------( 273      ( 23 Oct 2021 07:09 )             28.0     23 Oct 2021 07:09    143    |  112    |  39     ----------------------------<  101    3.8     |  24     |  2.40     Ca    8.8        23 Oct 2021 07:09    TPro  6.8    /  Alb  2.8    /  TBili  0.2    /  DBili  x      /  AST  15     /  ALT  20     /  AlkPhos  47     23 Oct 2021 07:09          Culture Results:   <10,000 CFU/mL Normal Urogenital Johnna (10-18 @ 18:35)  Culture Results:   No growth to date. (10-18 @ 16:04)  Culture Results:   No growth to date. (10-18 @ 16:04)          CARDIAC MARKERS ( 19 Oct 2021 02:22 )  .084 ng/mL / x     / x     / x     / x      CARDIAC MARKERS ( 18 Oct 2021 20:09 )  .086 ng/mL / x     / 90 U/L / x     / 1.7 ng/mL  CARDIAC MARKERS ( 18 Oct 2021 11:42 )  .059 ng/mL / x     / x     / x     / x              Culture - Urine (collected 18 Oct 2021 18:35)  Source: Clean Catch Clean Catch (Midstream)  Final Report (19 Oct 2021 14:17):    <10,000 CFU/mL Normal Urogenital Johnna    Culture - Blood (collected 18 Oct 2021 16:04)  Source: .Blood Blood-Peripheral  Preliminary Report (19 Oct 2021 17:02):    No growth to date.    Culture - Blood (collected 18 Oct 2021 16:04)  Source: .Blood Blood-Peripheral  Preliminary Report (19 Oct 2021 17:02):    No growth to date.         Anemia Panel:      Thyroid Panel:            Serum Pro-Brain Natriuretic Peptide: 62973 pg/mL (10-18-21 @ 11:42)      RADIOLOGY & ADDITIONAL TESTS:      HEALTH ISSUES - PROBLEM Dx:  Anemia    CAD (coronary artery disease)  s/p stents (not on plavix)    Depression with anxiety    DVT prophylaxis    Acute exacerbation of CHF (congestive heart failure)    Pneumonia    Hypertensive urgency    Atrial fibrillation  first documented on EKG 10/7/2021    Stage 3 chronic kidney disease    Type 2 diabetes mellitus  not on home insulin    HLD (hyperlipidemia)            Consultant(s) Notes Reviewed:  [  ] YES     Care Discussed with [X] Consultants  [ x ] Patient  [  ] Family [  ] HCP [  ]   [  ] Social Service  [  ] RN, [  ] Physical Therapy,[  ] Palliative care team  DVT PPX: [  ] Lovenox, [  ] S C Heparin, [  ] Coumadin, [  ] Xarelto, [ x ] Eliquis, [  ] Pradaxa, [  ] IV Heparin drip, [  ] SCD [  ] Contraindication 2 to GI Bleed,[  ] Ambulation [  ] Contraindicated 2 to  bleed [  ] Contraindicated 2 to Brain Bleed  Advanced directive: [ x ] None, [  ] DNR/DNI Patient is a 76y old  Male who presents with a chief complaint of Hypertensive urgency  (21 Oct 2021 13:26)    HPI:  77 yo M with PMHx of Type 2 DM (not on insulin), BPH, CAD s/p stents >4 years ago (not on plavix), diverticulitis (hospitalized 07/2020 at [facility]), GIB 2/2 diverticulosis (2020), anxiety, Lupus, HTN, HLD, CKD stage 3, HepC, 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 Patient states his SOB started on Sunday and has been worse with laying down. Does admit to intermittent CP associated with the SOB and also when getting imaging as that makes him anxious. Does not appear to have a clear understanding of his medical conditions. Also, states he had some bleeding in his mouth on Sunday, but there is no longer bleeding; on exam there is a healing cut on his tongue and inner right cheek with no active bleeding likely from the pt biting his tongue. Admits difficulty laying down straight due to SOB. Denies fevers, chills, abd pain, n/v, sore throat, cough, palpitations  Of note, pt was recently admitted to [facility] on 10/7/21 for hypertensive urgency, Afib and  findings of PNA. was Rxed with IV Zosyn x 7 days  Abx .pt also got 1 u pRBC transfusion past week.    ED Course:   Vitals: BP: 210/96-->181/84, HR: 79, Temp: 98 F, RR: 17, SpO2: 100% on RA-->89% on RA-->98% on 2L NC   Labs: H/H: 9.6/30.6, aPTT: 43, PT/INR: 18.9/1.65, BUN/Cr: 28/2.2, alb: 3.1, GFR: 28, procal: .16, trop: .059, proBNP:10,846  Imaging:   CxR:  Bilateral lung parenchymal airspace opacities, increased from prior. Small pleural effusions suggested  B/l LE Dopplers: No evidence of deep venous thrombosis in either lower extremity.  Bilateral popliteal fossa fluid collections, likely Baker's cysts.  EKG: HR: 74, Atrial fibrillation, Incomplete right bundle branch block, Nonspecific T wave abnormality, Prolonged QT(461)  Received in the ED: Vanco and Zosyn x2, IV lasix 60mg, 10mg Hydralazine IVP x2 (18 Oct 2021 17:24)    INTERVAL HPI:  10/19/21: Patient was seen and examined at bedside. Denies any complaints. States his shortness of breath has improved.   10/20/21: Patient was seen and examined at bedside. States he was not able to sleep well because of another patient screaming across the room. Otherwise denies worsening SOB, chest pain, palpitations.  10/21/21: Patient was seen and examined at bedside. Patient comfortably having breakfast in bed, alert and awake. States he has no further complaints. Stable Labs Low H/H . Cr stable.  10/22/21: No acute events overnight. Patient seen and examined at bedside. Patient denies any fever, chills, chest pain, dyspnea, abdominal pain, leg pain/swelling, dysuria, constipation, diarhea. Needs TB screening. PPD to be placed.  COVID test prior to discharge stabel labs   10/23/21: Patient was seen and examined at bedside. No acute events overnight. Patient complaining of atypical chest pain and palpitations. STAT ekg ordered- unchanged from prior. Ordered cardiac enzymes- will follow up. Ordered Cardizem 2.5mg x 1. No ACS    OVERNIGHT EVENTS: No acute overnight events.     Home Medications:  Lasix 40 mg oral tablet: 1 tab(s) orally once a day (18 Oct 2021 17:52)  Pepcid 20 mg oral tablet: 1 tab(s) orally every other day (18 Oct 2021 17:52)      MEDICATIONS  (STANDING):  amLODIPine   Tablet 10 milliGRAM(s) Oral daily  apixaban 5 milliGRAM(s) Oral every 12 hours  ARIPiprazole 30 milliGRAM(s) Oral daily  aspirin enteric coated 81 milliGRAM(s) Oral daily  atorvastatin 10 milliGRAM(s) Oral at bedtime  budesonide 160 MICROgram(s)/formoterol 4.5 MICROgram(s) Inhaler 2 Puff(s) Inhalation two times a day  cloNIDine 0.1 milliGRAM(s) Oral three times a day  cyanocobalamin 1000 MICROGram(s) Oral daily  diltiazem Injectable 2.5 milliGRAM(s) IV Push once  doxazosin 8 milliGRAM(s) Oral at bedtime  famotidine    Tablet 20 milliGRAM(s) Oral daily  folic acid 1 milliGRAM(s) Oral daily  furosemide    Tablet 60 milliGRAM(s) Oral two times a day  hydrALAZINE 100 milliGRAM(s) Oral three times a day  isosorbide   mononitrate ER Tablet (IMDUR) 30 milliGRAM(s) Oral daily  lamoTRIgine 100 milliGRAM(s) Oral daily  mirtazapine 30 milliGRAM(s) Oral at bedtime  NIFEdipine XL 60 milliGRAM(s) Oral daily  venlafaxine XR. 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 No nausea, No vomiting; No diarrhea, No constipation.   GENITOURINARY: No dysuria, No frequency, No urgency, No hematuria, NO incontinence  NEUROLOGICAL: No headaches, No dizziness, No numbness, No tingling, No tremors, No weakness  EXT: No Swelling, No Pain, No Edema  SKIN:  [ x ] No itching, burning, rashes, or lesions   MUSCULOSKELETAL: No joint pain ,No Jt swelling; No muscle pain, No back pain, No extremity pain  PSYCHIATRIC: No depression,  No anxiety,  No mood swings ,No difficulty sleeping at night  PAIN SCALE: [  ] None  [  ] Other-3-4/10- C Pain  ROS Unable to obtain due to - [  ] Dementia  [  ] Lethargy [  ] Drowsy [  ] Sedated [  ] non verbal  REST OF REVIEW Of SYSTEM - [ x ] Normal     Vital Signs Last 24 Hrs  T(C): 36.7 (23 Oct 2021 04:21), Max: 37 (22 Oct 2021 20:10)  T(F): 98 (23 Oct 2021 04:21), Max: 98.6 (22 Oct 2021 20:10)  HR: 78 (23 Oct 2021 08:34) (67 - 78)  BP: 139/65 (23 Oct 2021 08:34) (139/65 - 180/89)  BP(mean): --  RR: 19 (23 Oct 2021 08:34) (18 - 19)  SpO2: 92% (23 Oct 2021 08:34) (91% - 95%)  Finger Stick          PHYSICAL EXAM:  GENERAL:  [ x ] NAD , [ x ] well appearing, [  ] Agitated, [  ] Drowsy,  [  ] Lethargy, [  ] confused   HEAD:  [x  ] Normal, [  ] Other  EYES:  [ x ] EOMI, [  ] PERRLA, [ x ] conjunctiva and sclera clear normal, [  ] Other,  [  ] Pallor,[  ] Discharge  ENMT:  [ x ] Normal, [  ] Moist mucous membranes, [ x ] Good dentition, [ x ] No Thrush  NECK:  [ x ] Supple, [ x ] No JVD, [ x ] Normal thyroid, [  ] Lymphadenopathy [  ] Other  CHEST/LUNG:  [ x ] Clear to auscultation bilaterally, [ x ] Breath Sounds equal B/L  [  ] poor effort  [ x ] No rales, [ x ] No rhonchi  [ x ]  No wheezing,   HEART:  [ x ] Regular rate and rhythm, [  ] tachycardia, [  ] Bradycardia,  [  ] irregular  [ x ] No murmurs, No rubs, No gallops, [  ] PPM in place (Mfr:  )  ABDOMEN:  [ x ] Soft, [x] tender RUQ, [ x] Nondistended, [x  ] No mass, [x] Bowel sounds present, [x] obese  NERVOUS SYSTEM:  [x  ] Alert & Oriented X3, [ x ] Nonfocal  [  ] Confusion  [  ] Encephalopathic [  ] Sedated [  ] Unable to assess, [  ] Dementia [  ] Other-  EXTREMITIES: [ x ] 2+ Peripheral Pulses, No clubbing, No cyanosis,  [x  ]  2 = pittingedema B/L lower EXT. [  ] PVD stasis skin changes B/L Lower EXT, [  ] wound  LYMPH: No lymphadenopathy noted  SKIN:  [ x ] No rashes or lesions, [  ] Pressure Ulcers, [  ] ecchymosis, [  ] Skin Tears, [  ] Other    DIET: Diet, Dysphagia 3 Soft-Thin Liquids:   Low Sodium (10-19-21 @ 09:33)      LABS:                        8.5    5.98  )-----------( 273      ( 23 Oct 2021 07:09 )             28.0     23 Oct 2021 07:09    143    |  112    |  39     ----------------------------<  101    3.8     |  24     |  2.40     Ca    8.8        23 Oct 2021 07:09    TPro  6.8    /  Alb  2.8    /  TBili  0.2    /  DBili  x      /  AST  15     /  ALT  20     /  AlkPhos  47     23 Oct 2021 07:09          Culture Results:   <10,000 CFU/mL Normal Urogenital Johnna (10-18 @ 18:35)  Culture Results:   No growth to date. (10-18 @ 16:04)  Culture Results:   No growth to date. (10-18 @ 16:04)          CARDIAC MARKERS ( 19 Oct 2021 02:22 )  .084 ng/mL / x     / x     / x     / x      CARDIAC MARKERS ( 18 Oct 2021 20:09 )  .086 ng/mL / x     / 90 U/L / x     / 1.7 ng/mL  CARDIAC MARKERS ( 18 Oct 2021 11:42 )  .059 ng/mL / x     / x     / x     / x              Culture - Urine (collected 18 Oct 2021 18:35)  Source: Clean Catch Clean Catch (Midstream)  Final Report (19 Oct 2021 14:17):    <10,000 CFU/mL Normal Urogenital Johnna    Culture - Blood (collected 18 Oct 2021 16:04)  Source: .Blood Blood-Peripheral  Preliminary Report (19 Oct 2021 17:02):    No growth to date.    Culture - Blood (collected 18 Oct 2021 16:04)  Source: .Blood Blood-Peripheral  Preliminary Report (19 Oct 2021 17:02):    No growth to date.         Anemia Panel:      Thyroid Panel:            Serum Pro-Brain Natriuretic Peptide: 10891 pg/mL (10-18-21 @ 11:42)      RADIOLOGY & ADDITIONAL TESTS:      HEALTH ISSUES - PROBLEM Dx:  Anemia    CAD (coronary artery disease)  s/p stents (not on plavix)    Depression with anxiety    DVT prophylaxis    Acute exacerbation of CHF (congestive heart failure)    Pneumonia    Hypertensive urgency    Atrial fibrillation  first documented on EKG 10/7/2021    Stage 3 chronic kidney disease    Type 2 diabetes mellitus  not on home insulin    HLD (hyperlipidemia)            Consultant(s) Notes Reviewed:  [x  ] YES     Care Discussed with [X] Consultants  [ x ] Patient  [  ] Family [  ] HCP [  ]   [  ] Social Service  [  ] RN, [  ] Physical Therapy,[  ] Palliative care team  DVT PPX: [  ] Lovenox, [  ] S C Heparin, [  ] Coumadin, [  ] Xarelto, [ x ] Eliquis, [  ] Pradaxa, [  ] IV Heparin drip, [  ] SCD [  ] Contraindication 2 to GI Bleed,[  ] Ambulation [  ] Contraindicated 2 to  bleed [  ] Contraindicated 2 to Brain Bleed  Advanced directive: [ x ] None, [  ] DNR/DNI

## 2021-10-23 NOTE — PROGRESS NOTE ADULT - SUBJECTIVE AND OBJECTIVE BOX
[facility] -- Fifi Bliss, Kalpesh Valdovinos, Abraham Sheridan Savella, [place]: Office # [phone]    Follow Up:  Hypertensive Urgency AFib Chest pain     Subjective/Observations: Patient seen and examined. Patient awake, alert, resting comfortably in bed. No complaints of chest pain, dyspnea, palpitations or dizziness. Tolerating room air.     REVIEW OF SYSTEMS: All review of systems is negative for eye, ENT, GI, , allergic, dermatologic, musculoskeletal and neurologic except as described above    PAST MEDICAL & SURGICAL HISTORY:  HTN (hypertension)  c/b multiple episodes of hypertensive urgency    HLD (hyperlipidemia)    Atrial fibrillation  first documented on EKG 10/7/2021    CAD (coronary artery disease)  s/p stents (not on plavix)    Type 2 diabetes mellitus  not on home insulin/ Meds    Anxiety  Depression  multiple psych medications    History of diverticulitis  07/2021    Diverticulosis  c/b GIB in 2020    Afib  on AC    Stage 3 chronic kidney disease    Anemia of chronic disease  Monoclonal Gammopathy-MGUS  pRBC transfusion 10/15/21    Chronic diastolic congestive heart failure    Multiple thyroid nodules    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  Had surgery ( April 2013 )    S/P tonsillectomy    S/P arthroscopic knee surgery  Bilateral ( 2005 )    Torsion of testicle  Had surgery at age 14    Pilonidal cyst  Had surgery ( 1969 )    S/P cataract surgery  Bilateral    H/O hernia repair        MEDICATIONS  (STANDING):  amLODIPine   Tablet 10 milliGRAM(s) Oral daily  apixaban 5 milliGRAM(s) Oral every 12 hours  ARIPiprazole 30 milliGRAM(s) Oral daily  aspirin enteric coated 81 milliGRAM(s) Oral daily  atorvastatin 10 milliGRAM(s) Oral at bedtime  budesonide 160 MICROgram(s)/formoterol 4.5 MICROgram(s) Inhaler 2 Puff(s) Inhalation two times a day  cloNIDine 0.1 milliGRAM(s) Oral three times a day  cyanocobalamin 1000 MICROGram(s) Oral daily  doxazosin 8 milliGRAM(s) Oral at bedtime  famotidine    Tablet 20 milliGRAM(s) Oral daily  folic acid 1 milliGRAM(s) Oral daily  furosemide    Tablet 60 milliGRAM(s) Oral two times a day  hydrALAZINE 100 milliGRAM(s) Oral three times a day  isosorbide   mononitrate ER Tablet (IMDUR) 30 milliGRAM(s) Oral daily  lamoTRIgine 100 milliGRAM(s) Oral daily  mirtazapine 30 milliGRAM(s) Oral at bedtime  NIFEdipine XL 60 milliGRAM(s) Oral daily  venlafaxine XR. 150 milliGRAM(s) Oral daily    MEDICATIONS  (PRN):    Allergies    No Known Allergies    Intolerances      Vital Signs Last 24 Hrs  T(C): 36.7 (23 Oct 2021 04:21), Max: 37 (22 Oct 2021 20:10)  T(F): 98 (23 Oct 2021 04:21), Max: 98.6 (22 Oct 2021 20:10)  HR: 78 (23 Oct 2021 08:34) (67 - 78)  BP: 139/65 (23 Oct 2021 08:34) (139/65 - 180/89)  BP(mean): --  RR: 19 (23 Oct 2021 08:34) (18 - 19)  SpO2: 92% (23 Oct 2021 08:34) (91% - 95%)  I&O's Summary        TELE: SR 66-80  PHYSICAL EXAM:  Appearance: NAD, no distress, alert, Well developed   HEENT: Moist Mucous Membranes, Anicteric  Cardiovascular: Regular rate and rhythm, Normal S1 S2, No JVD, + murmur, No rubs, gallops or clicks  Respiratory: Non-labored, Clear to auscultation, No rales, No rhonchi, No wheezing.   Gastrointestinal:  Soft, Non-tender, + BS  Skin: Warm and dry, No visible rashes or ulcers, No ecchymosis, No cyanosis  Musculoskeletal: No clubbing, No cyanosis, No joint swelling/tenderness  Psychiatry: Mood & affect appropriate  Lymph: No peripheral edema.     LABS: All Labs Reviewed:                        8.5    5.98  )-----------( 273      ( 23 Oct 2021 07:09 )             28.0                         9.0    7.33  )-----------( 306      ( 22 Oct 2021 09:12 )             29.7                         9.5    6.10  )-----------( 329      ( 21 Oct 2021 07:01 )             31.0     23 Oct 2021 07:09    143    |  112    |  39     ----------------------------<  101    3.8     |  24     |  2.40   22 Oct 2021 09:12    142    |  109    |  38     ----------------------------<  136    3.5     |  25     |  2.60   21 Oct 2021 07:01    140    |  108    |  39     ----------------------------<  112    3.5     |  25     |  2.60     Ca    8.8        23 Oct 2021 07:09  Ca    8.8        22 Oct 2021 09:12  Ca    8.7        21 Oct 2021 07:01    TPro  6.8    /  Alb  2.8    /  TBili  0.2    /  DBili  x      /  AST  15     /  ALT  20     /  AlkPhos  47     23 Oct 2021 07:09  TPro  7.3    /  Alb  2.9    /  TBili  0.3    /  DBili  x      /  AST  15     /  ALT  21     /  AlkPhos  50     22 Oct 2021 09:12  TPro  6.9    /  Alb  2.7    /  TBili  0.2    /  DBili  x      /  AST  12     /  ALT  21     /  AlkPhos  49     21 Oct 2021 07:01      Troponin I, Serum: .084 ng/mL (10-19-21 @ 02:22)  Creatine Kinase, Serum: 90 U/L (10-18-21 @ 20:09)  Troponin I, Serum: .086 ng/mL (10-18-21 @ 20:09)  Cholesterol, Serum: 174 mg/dL (10-07-21 @ 11:54)  HDL Cholesterol, Serum: 42 mg/dL (10-07-21 @ 11:54)  Triglycerides, Serum: 160 mg/dL (10-07-21 @ 11:54)      12 Lead ECG:   Ventricular Rate 74 BPM  Atrial Rate 70 BPM  QRS Duration 114 ms  Q-T Interval 416 ms  QTC Calculation(Bazett) 461 ms  R Axis -10 degrees  T Axis 52 degrees  Diagnosis Line Atrial fibrillation  Incomplete right bundle branch block  Moderate voltage criteria for LVH, may be normal variant  Nonspecific T wave abnormality  Prolonged QT  Abnormal ECG  Confirmed by Bonifacio BARRERA, Victorino ([id number]) on 10/18/2021 1:39:20 PM (10-18-21 @ 12:24)    < from: TTE Echo Complete w/o Contrast w/ Doppler (10.13.21 @ 09:51) >   EXAM:  ECHO TTE WO CON COMP W DOPP    PROCEDURE DATE:  10/13/2021    INTERPRETATION:  Ordering Physician: SKYLA TERRY [id number]  Indication: Cardiac arrhythmias.  Technician: INDIGO  Study Quality: Technical difficult study.  A complete echocardiographic study was performed utilizing standard protocol including spectral and color Doppler in all echocardiographic windows.  Height: 172cm  Weight: 102kg  BSA: 2.1m2  Blood Pressure: 150/90  MEASUREMENTS  IVS: 1.3cm  PWT: 1.3cm  LA: 4.2cm  AO: 3.5cm  LVIDd: 5.4 cm.  LVIDs: 3.5cm  LVEF: 55-60%.  PASP: 34mm Hg  FINDINGS  Left Ventricle: Normal in  size and normal LV systolic function with estimated LVEF 55-60%.  Right Ventricle: Normal in size and normal RV systolic function.  Left Atrium: Mild dilated.  Right Atrium: Normal in size.  Mitral Valve: Mild mitral annular calcification is present. Mitral valve is mildly calcified and no evidence of any mitral stenosis. Mild mitral regurgitation is present.  Aortic Valve: Aortic root is mildsclerotic and of normal dimension. Aortic valve is mildly calcified and mild  aortic stenosis is present with peak gradient across aortic valve is 30 mmHg. Aortic valve area not calculated.  Tricuspid Valve: Mild tricuspid regurgitation with calculated PA systolic pressure 34 mmHg is present. No evidence of any pulmonary hypertension  Pulmonic Valve: Trace Pulmonary regurgitation is present.  Diastolic Function: LV diastolic function cannot determine due to  underlying atrial fibrillation.  Pericardium/Pleura: No evidence of any pericardial effusion.  No intracardiac mass  thrombus or vegetations and  tumor.  Mild concentric left ventricular hypertrophy is present.  IMPRESSION:  Normal LV size with normal LV systolic function with estimated LVEF 55-60%.  LV diastolic function cannot determine due to atrial fibrillation.  Mild mitral regurgitation is present.  Mild aortic stenosis is  present.  Mild tricuspid regurgitation is present . No evidence of any pulmonary hypertension  Correlate clinically above findings.  --- End of Report ---    < end of copied text >    < from: CT Chest No Cont (10.18.21 @ 19:26) >  EXAM:  CT CHEST                        PROCEDURE DATE:  10/18/2021    INTERPRETATION:  PROCEDURE INFORMATION:  Exam: CT Chest Without Contrast; Diagnostic  Exam date and time: 10/18/2021 7:20 PM  Age: 76 years old  Clinical indication: Shortness of breath  TECHNIQUE:  Imaging protocol: Diagnostic computed tomography of the chest without contrast.  COMPARISON:  CT CHEST 10/6/2021  FINDINGS:  Lungs: Multifocal bilateral pneumonia, organism nonspecific, does not have the characteristic appearance of COVID-19 but is within range of what can be seen with COVID-19 pneumonia.  Pleural spaces: Small bilateral pleural effusions.  Heart: Cardiomegaly.  Aorta: Atherosclerotic calcifications of thoracic aorta and coronary arteries. No aortic aneurysm.  Lymph nodes: Unremarkable. No enlarged lymph nodes.  Bones/joints: Degenerative changes. No acute fracture.  Soft tissues: Unremarkable.  Upper abdomen: Right renal cysts. Nonspecific left adrenal thickening. Colonic diverticulosis.  IMPRESSION:  1. Small bilateral pleural effusions.  2. Multifocal bilateral pneumonia, organism nonspecific, does not have the characteristic appearance of COVID-19 but is within range of what can be seen with COVID-19 pneumonia.  --- End of Report ---  < end of copied text >    < from: US Duplex Venous Lower Ext Complete, Bilateral (10.18.21 @ 14:03) >  IMPRESSION:  No evidence of deep venous thrombosis in either lower extremity.  Bilateral popliteal fossa fluid collections, likely Baker's cysts.  --- End of Report ---  < end of copied text >

## 2021-10-23 NOTE — PROGRESS NOTE ADULT - SUBJECTIVE AND OBJECTIVE BOX
Date/Time Patient Seen:  		  Referring MD:   Data Reviewed	       Patient is a 76y old  Male who presents with a chief complaint of Hypertensive urgency  (21 Oct 2021 13:26)      Subjective/HPI     PAST MEDICAL & SURGICAL HISTORY:  Hypertension    Diabetes mellitus    Lupus    Hepatitis C    Anxiety and depression    CAD (coronary artery disease)  s/p stents    Diverticulosis    Hyperlipidemia    HTN (hypertension)  c/b multiple episodes of hypertensive urgency    HLD (hyperlipidemia)    Atrial fibrillation  first documented on EKG 10/7/2021    CAD (coronary artery disease)  s/p stents (not on plavix)    Type 2 diabetes mellitus  not on home insulin/ Meds    Anxiety  Depression  multiple psych medications    History of diverticulitis  07/2021    Diverticulosis  c/b GIB in 2020    Afib  on AC    Stage 3 chronic kidney disease    Anemia of chronic disease  Monoclonal Gammopathy-MGUS  pRBC transfusion 10/15/21    Chronic diastolic congestive heart failure    Multiple thyroid nodules    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  Had surgery ( April 2013 )    S/P tonsillectomy    S/P arthroscopic knee surgery  Bilateral ( 2005 )    Torsion of testicle  Had surgery at age 14    Pilonidal cyst  Had surgery ( 1969 )    S/P cataract surgery  Bilateral    H/O hernia repair          Medication list         MEDICATIONS  (STANDING):  amLODIPine   Tablet 10 milliGRAM(s) Oral daily  apixaban 5 milliGRAM(s) Oral every 12 hours  ARIPiprazole 30 milliGRAM(s) Oral daily  aspirin enteric coated 81 milliGRAM(s) Oral daily  atorvastatin 10 milliGRAM(s) Oral at bedtime  budesonide 160 MICROgram(s)/formoterol 4.5 MICROgram(s) Inhaler 2 Puff(s) Inhalation two times a day  cloNIDine 0.1 milliGRAM(s) Oral three times a day  cyanocobalamin 1000 MICROGram(s) Oral daily  doxazosin 8 milliGRAM(s) Oral at bedtime  famotidine    Tablet 20 milliGRAM(s) Oral daily  folic acid 1 milliGRAM(s) Oral daily  furosemide    Tablet 60 milliGRAM(s) Oral two times a day  hydrALAZINE 100 milliGRAM(s) Oral three times a day  isosorbide   mononitrate ER Tablet (IMDUR) 30 milliGRAM(s) Oral daily  lamoTRIgine 100 milliGRAM(s) Oral daily  mirtazapine 30 milliGRAM(s) Oral at bedtime  NIFEdipine XL 60 milliGRAM(s) Oral daily  venlafaxine XR. 150 milliGRAM(s) Oral daily    MEDICATIONS  (PRN):         Vitals log        ICU Vital Signs Last 24 Hrs  T(C): 36.7 (23 Oct 2021 04:21), Max: 37 (22 Oct 2021 20:10)  T(F): 98 (23 Oct 2021 04:21), Max: 98.6 (22 Oct 2021 20:10)  HR: 67 (23 Oct 2021 04:21) (67 - 74)  BP: 150/67 (23 Oct 2021 04:21) (149/54 - 180/89)  BP(mean): --  ABP: --  ABP(mean): --  RR: 18 (23 Oct 2021 04:21) (18 - 18)  SpO2: 92% (23 Oct 2021 04:21) (91% - 95%)           Input and Output:  I&O's Detail    21 Oct 2021 07:01  -  22 Oct 2021 07:00  --------------------------------------------------------  IN:  Total IN: 0 mL    OUT:    Voided (mL): 600 mL  Total OUT: 600 mL    Total NET: -600 mL          Lab Data                        9.0    7.33  )-----------( 306      ( 22 Oct 2021 09:12 )             29.7     10-22    142  |  109<H>  |  38<H>  ----------------------------<  136<H>  3.5   |  25  |  2.60<H>    Ca    8.8      22 Oct 2021 09:12    TPro  7.3  /  Alb  2.9<L>  /  TBili  0.3  /  DBili  x   /  AST  15  /  ALT  21  /  AlkPhos  50  10-22            Review of Systems	      Objective     Physical Examination    heart s1s2  lung dec BS      Pertinent Lab findings & Imaging      Nikko:  NO   Adequate UO     I&O's Detail    21 Oct 2021 07:01  -  22 Oct 2021 07:00  --------------------------------------------------------  IN:  Total IN: 0 mL    OUT:    Voided (mL): 600 mL  Total OUT: 600 mL    Total NET: -600 mL               Discussed with:     Cultures:	        Radiology

## 2021-10-23 NOTE — PROGRESS NOTE ADULT - PROBLEM SELECTOR PLAN 2
- Pt presented with /96 --- unclear if pt is compliant with his medications as he does not appear to have a good understanding of his medical conditions --- Psych, Dr. Winkler, consulted for assessment of capacity- ok to discharge from psych standpoint   - No vision changes, headache, ARIELLA, current CP, ECHO done.  - C/w with home antihypertensives:  Lasix 60 mg 2x day  -  Continue clonidine 0.1 TID  - On hydralazine 100 mg q 8 hrs   - continue amlodipine 10 mg qd  - continue nifedipine XL  - continue  Imdur 30mg   - Cardio, Dr. Bliss's group, following.

## 2021-10-23 NOTE — PROGRESS NOTE ADULT - PROBLEM SELECTOR PLAN 6
- CKD 3-4- Cr stable   - baseline Cr appears to be 1.6 - 2.3  - Cr on admission 2.2; improved from 2.5 on 10/16  - Avoid nephrotoxic medications  - Trend BMP  - Nephro, Dr. Rodarte following - Lasix 60mg PO BID.

## 2021-10-23 NOTE — PROGRESS NOTE ADULT - ASSESSMENT
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) living in a group home [facility]    PPD placed -     vs noted  labs reviewed  on LASIX  for CHF - dose increased -   MBS noted - Dysphagia diet -     completed ABX for PNA  ct chest from recent admission reviewed, cxr noted, repeat CT chest reviewed - multifocal pna reported - however - clinically does not correlate -   would monitor off ABX - biomarkers - cx - monitor VS and Sat  assess - eval for HF - vol overload - Diuresis - I and O - cardio eval - TTE reviewed  ELMER - does not tolerate CPAP  CKD - monitor renal indices - i and o - replete lytes  dysphagia diet - asp prec - HOB elev - oral hygiene  assist with needs - ADL  emotional support  tele monitor  old records reviewed

## 2021-10-24 LAB
ALBUMIN SERPL ELPH-MCNC: 2.7 G/DL — LOW (ref 3.3–5)
ALP SERPL-CCNC: 50 U/L — SIGNIFICANT CHANGE UP (ref 40–120)
ALT FLD-CCNC: 23 U/L — SIGNIFICANT CHANGE UP (ref 12–78)
ANION GAP SERPL CALC-SCNC: 11 MMOL/L — SIGNIFICANT CHANGE UP (ref 5–17)
AST SERPL-CCNC: 11 U/L — LOW (ref 15–37)
BASOPHILS # BLD AUTO: 0.03 K/UL — SIGNIFICANT CHANGE UP (ref 0–0.2)
BASOPHILS NFR BLD AUTO: 0.4 % — SIGNIFICANT CHANGE UP (ref 0–2)
BILIRUB SERPL-MCNC: 0.2 MG/DL — SIGNIFICANT CHANGE UP (ref 0.2–1.2)
BUN SERPL-MCNC: 41 MG/DL — HIGH (ref 7–23)
CALCIUM SERPL-MCNC: 8.9 MG/DL — SIGNIFICANT CHANGE UP (ref 8.5–10.1)
CHLORIDE SERPL-SCNC: 109 MMOL/L — HIGH (ref 96–108)
CO2 SERPL-SCNC: 23 MMOL/L — SIGNIFICANT CHANGE UP (ref 22–31)
CREAT SERPL-MCNC: 2.7 MG/DL — HIGH (ref 0.5–1.3)
EOSINOPHIL # BLD AUTO: 0.18 K/UL — SIGNIFICANT CHANGE UP (ref 0–0.5)
EOSINOPHIL NFR BLD AUTO: 2.5 % — SIGNIFICANT CHANGE UP (ref 0–6)
GLUCOSE SERPL-MCNC: 221 MG/DL — HIGH (ref 70–99)
HCT VFR BLD CALC: 29.6 % — LOW (ref 39–50)
HGB BLD-MCNC: 8.8 G/DL — LOW (ref 13–17)
IMM GRANULOCYTES NFR BLD AUTO: 0.4 % — SIGNIFICANT CHANGE UP (ref 0–1.5)
LYMPHOCYTES # BLD AUTO: 1.03 K/UL — SIGNIFICANT CHANGE UP (ref 1–3.3)
LYMPHOCYTES # BLD AUTO: 14.3 % — SIGNIFICANT CHANGE UP (ref 13–44)
MCHC RBC-ENTMCNC: 25.5 PG — LOW (ref 27–34)
MCHC RBC-ENTMCNC: 29.7 GM/DL — LOW (ref 32–36)
MCV RBC AUTO: 85.8 FL — SIGNIFICANT CHANGE UP (ref 80–100)
MONOCYTES # BLD AUTO: 0.34 K/UL — SIGNIFICANT CHANGE UP (ref 0–0.9)
MONOCYTES NFR BLD AUTO: 4.7 % — SIGNIFICANT CHANGE UP (ref 2–14)
NEUTROPHILS # BLD AUTO: 5.59 K/UL — SIGNIFICANT CHANGE UP (ref 1.8–7.4)
NEUTROPHILS NFR BLD AUTO: 77.7 % — HIGH (ref 43–77)
NRBC # BLD: 0 /100 WBCS — SIGNIFICANT CHANGE UP (ref 0–0)
PLATELET # BLD AUTO: 257 K/UL — SIGNIFICANT CHANGE UP (ref 150–400)
POTASSIUM SERPL-MCNC: 3.5 MMOL/L — SIGNIFICANT CHANGE UP (ref 3.5–5.3)
POTASSIUM SERPL-SCNC: 3.5 MMOL/L — SIGNIFICANT CHANGE UP (ref 3.5–5.3)
PROT SERPL-MCNC: 7.2 G/DL — SIGNIFICANT CHANGE UP (ref 6–8.3)
RBC # BLD: 3.45 M/UL — LOW (ref 4.2–5.8)
RBC # FLD: 17.2 % — HIGH (ref 10.3–14.5)
SARS-COV-2 RNA SPEC QL NAA+PROBE: SIGNIFICANT CHANGE UP
SODIUM SERPL-SCNC: 143 MMOL/L — SIGNIFICANT CHANGE UP (ref 135–145)
WBC # BLD: 7.2 K/UL — SIGNIFICANT CHANGE UP (ref 3.8–10.5)
WBC # FLD AUTO: 7.2 K/UL — SIGNIFICANT CHANGE UP (ref 3.8–10.5)

## 2021-10-24 PROCEDURE — 99232 SBSQ HOSP IP/OBS MODERATE 35: CPT

## 2021-10-24 RX ORDER — ISOSORBIDE MONONITRATE 60 MG/1
60 TABLET, EXTENDED RELEASE ORAL DAILY
Refills: 0 | Status: DISCONTINUED | OUTPATIENT
Start: 2021-10-24 | End: 2021-10-29

## 2021-10-24 RX ORDER — NIFEDIPINE 30 MG
60 TABLET, EXTENDED RELEASE 24 HR ORAL DAILY
Refills: 0 | Status: DISCONTINUED | OUTPATIENT
Start: 2021-10-24 | End: 2021-10-24

## 2021-10-24 RX ADMIN — Medication 60 MILLIGRAM(S): at 17:34

## 2021-10-24 RX ADMIN — ARIPIPRAZOLE 30 MILLIGRAM(S): 15 TABLET ORAL at 11:45

## 2021-10-24 RX ADMIN — MIRTAZAPINE 30 MILLIGRAM(S): 45 TABLET, ORALLY DISINTEGRATING ORAL at 21:08

## 2021-10-24 RX ADMIN — LAMOTRIGINE 100 MILLIGRAM(S): 25 TABLET, ORALLY DISINTEGRATING ORAL at 11:45

## 2021-10-24 RX ADMIN — FAMOTIDINE 20 MILLIGRAM(S): 10 INJECTION INTRAVENOUS at 11:45

## 2021-10-24 RX ADMIN — ATORVASTATIN CALCIUM 10 MILLIGRAM(S): 80 TABLET, FILM COATED ORAL at 21:08

## 2021-10-24 RX ADMIN — ISOSORBIDE MONONITRATE 30 MILLIGRAM(S): 60 TABLET, EXTENDED RELEASE ORAL at 11:45

## 2021-10-24 RX ADMIN — AMLODIPINE BESYLATE 10 MILLIGRAM(S): 2.5 TABLET ORAL at 05:32

## 2021-10-24 RX ADMIN — Medication 100 MILLIGRAM(S): at 13:51

## 2021-10-24 RX ADMIN — Medication 100 MILLIGRAM(S): at 05:32

## 2021-10-24 RX ADMIN — Medication 150 MILLIGRAM(S): at 11:45

## 2021-10-24 RX ADMIN — Medication 0.1 MILLIGRAM(S): at 11:45

## 2021-10-24 RX ADMIN — Medication 60 MILLIGRAM(S): at 05:32

## 2021-10-24 RX ADMIN — Medication 8 MILLIGRAM(S): at 21:08

## 2021-10-24 RX ADMIN — PREGABALIN 1000 MICROGRAM(S): 225 CAPSULE ORAL at 11:46

## 2021-10-24 RX ADMIN — Medication 81 MILLIGRAM(S): at 11:45

## 2021-10-24 RX ADMIN — Medication 1 MILLIGRAM(S): at 11:45

## 2021-10-24 RX ADMIN — TUBERCULIN PURIFIED PROTEIN DERIVATIVE 5 UNIT(S): 5 INJECTION, SOLUTION INTRADERMAL at 23:55

## 2021-10-24 RX ADMIN — Medication 0.2 MILLIGRAM(S): at 17:34

## 2021-10-24 RX ADMIN — APIXABAN 5 MILLIGRAM(S): 2.5 TABLET, FILM COATED ORAL at 05:32

## 2021-10-24 RX ADMIN — Medication 100 MILLIGRAM(S): at 21:08

## 2021-10-24 RX ADMIN — APIXABAN 5 MILLIGRAM(S): 2.5 TABLET, FILM COATED ORAL at 17:34

## 2021-10-24 RX ADMIN — Medication 0.1 MILLIGRAM(S): at 05:32

## 2021-10-24 NOTE — PROGRESS NOTE ADULT - ATTENDING COMMENTS
Seen/examined. agree with above.  doing better  remains hypertensive, cont with clonidine, norvasc, hydralazine  not clear why on nifedipine and amlodipine  increase imdur to 60  cont with po diuretics

## 2021-10-24 NOTE — PROGRESS NOTE ADULT - SUBJECTIVE AND OBJECTIVE BOX
Date/Time Patient Seen:  		  Referring MD:   Data Reviewed	       Patient is a 76y old  Male who presents with a chief complaint of Hypertensive urgency  (21 Oct 2021 13:26)      Subjective/HPI     PAST MEDICAL & SURGICAL HISTORY:  Hypertension    Diabetes mellitus    Lupus    Hepatitis C    Anxiety and depression    CAD (coronary artery disease)  s/p stents    Diverticulosis    Hyperlipidemia    HTN (hypertension)  c/b multiple episodes of hypertensive urgency    HLD (hyperlipidemia)    Atrial fibrillation  first documented on EKG 10/7/2021    CAD (coronary artery disease)  s/p stents (not on plavix)    Type 2 diabetes mellitus  not on home insulin/ Meds    Anxiety  Depression  multiple psych medications    History of diverticulitis  07/2021    Diverticulosis  c/b GIB in 2020    Afib  on AC    Stage 3 chronic kidney disease    Anemia of chronic disease  Monoclonal Gammopathy-MGUS  pRBC transfusion 10/15/21    Chronic diastolic congestive heart failure    Multiple thyroid nodules    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  Had surgery ( April 2013 )    S/P tonsillectomy    S/P arthroscopic knee surgery  Bilateral ( 2005 )    Torsion of testicle  Had surgery at age 14    Pilonidal cyst  Had surgery ( 1969 )    S/P cataract surgery  Bilateral    H/O hernia repair          Medication list         MEDICATIONS  (STANDING):  amLODIPine   Tablet 10 milliGRAM(s) Oral daily  apixaban 5 milliGRAM(s) Oral every 12 hours  ARIPiprazole 30 milliGRAM(s) Oral daily  aspirin enteric coated 81 milliGRAM(s) Oral daily  atorvastatin 10 milliGRAM(s) Oral at bedtime  budesonide 160 MICROgram(s)/formoterol 4.5 MICROgram(s) Inhaler 2 Puff(s) Inhalation two times a day  cloNIDine 0.1 milliGRAM(s) Oral three times a day  cyanocobalamin 1000 MICROGram(s) Oral daily  doxazosin 8 milliGRAM(s) Oral at bedtime  famotidine    Tablet 20 milliGRAM(s) Oral daily  folic acid 1 milliGRAM(s) Oral daily  furosemide    Tablet 60 milliGRAM(s) Oral two times a day  hydrALAZINE 100 milliGRAM(s) Oral three times a day  isosorbide   mononitrate ER Tablet (IMDUR) 30 milliGRAM(s) Oral daily  lamoTRIgine 100 milliGRAM(s) Oral daily  mirtazapine 30 milliGRAM(s) Oral at bedtime  NIFEdipine XL 60 milliGRAM(s) Oral daily  venlafaxine XR. 150 milliGRAM(s) Oral daily    MEDICATIONS  (PRN):         Vitals log        ICU Vital Signs Last 24 Hrs  T(C): 37.1 (24 Oct 2021 04:51), Max: 37.1 (24 Oct 2021 04:51)  T(F): 98.8 (24 Oct 2021 04:51), Max: 98.8 (24 Oct 2021 04:51)  HR: 72 (24 Oct 2021 04:51) (62 - 72)  BP: 186/87 (24 Oct 2021 04:51) (141/75 - 186/87)  BP(mean): --  ABP: --  ABP(mean): --  RR: 18 (24 Oct 2021 04:51) (16 - 18)  SpO2: 92% (24 Oct 2021 04:51) (92% - 94%)           Input and Output:  I&O's Detail    23 Oct 2021 07:01  -  24 Oct 2021 07:00  --------------------------------------------------------  IN:  Total IN: 0 mL    OUT:    Voided (mL): 500 mL  Total OUT: 500 mL    Total NET: -500 mL          Lab Data                        8.5    5.98  )-----------( 273      ( 23 Oct 2021 07:09 )             28.0     10-23    143  |  112<H>  |  39<H>  ----------------------------<  101<H>  3.8   |  24  |  2.40<H>    Ca    8.8      23 Oct 2021 07:09    TPro  6.8  /  Alb  2.8<L>  /  TBili  0.2  /  DBili  x   /  AST  15  /  ALT  20  /  AlkPhos  47  10-23      CARDIAC MARKERS ( 23 Oct 2021 14:13 )  .022 ng/mL / x     / 40 U/L / x     / 1.4 ng/mL  CARDIAC MARKERS ( 23 Oct 2021 09:42 )  .034 ng/mL / x     / 44 U/L / x     / 1.6 ng/mL        Review of Systems	      Objective     Physical Examination    heart s1s2  lung dec BS  abd soft      Pertinent Lab findings & Imaging      Nikko:  NO   Adequate UO     I&O's Detail    23 Oct 2021 07:01  -  24 Oct 2021 07:00  --------------------------------------------------------  IN:  Total IN: 0 mL    OUT:    Voided (mL): 500 mL  Total OUT: 500 mL    Total NET: -500 mL               Discussed with:     Cultures:	        Radiology

## 2021-10-24 NOTE — PROGRESS NOTE ADULT - SUBJECTIVE AND OBJECTIVE BOX
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 with [facility]. Patient states his SOB started on Sunday and has been worse with laying down. Does admit to intermittent CP associated with the SOB and also when getting imaging as that makes him anxious. Does not appear to have a clear understanding of his medical conditions. Also, states he had some bleeding in his mouth on Sunday, but there is no longer bleeding; on exam there is a healing cut on his tongue and inner right cheek with no active bleeding likely from the pt biting his tongue. Admits difficulty laying down straight due to SOB. Denies fevers, chills, abd pain, n/v, sore throat, cough, palpitations  Of note, pt was recently admitted to [facility] on 10/7/21 for hypertensive urgency, Afib and  findings of PNA. was Rxed with IV Zosyn x 7 days  Abx .pt also got 1 u pRBC transfusion past week.    ED Course:   Vitals: BP: 210/96-->181/84, HR: 79, Temp: 98 F, RR: 17, SpO2: 100% on RA-->89% on RA-->98% on 2L NC   Labs: H/H: 9.6/30.6, aPTT: 43, PT/INR: 18.9/1.65, BUN/Cr: 28/2.2, alb: 3.1, GFR: 28, procal: .16, trop: .059, proBNP:10,846  Imaging:   CxR:  Bilateral lung parenchymal airspace opacities, increased from prior. Small pleural effusions suggested  B/l LE Dopplers: No evidence of deep venous thrombosis in either lower extremity.  Bilateral popliteal fossa fluid collections, likely Baker's cysts.  EKG: HR: 74, Atrial fibrillation, Incomplete right bundle branch block, Nonspecific T wave abnormality, Prolonged QT(461)  Received in the ED: Vanco and Zosyn x2, IV lasix 60mg, 10mg Hydralazine IVP x2 (18 Oct 2021 17:24)    INTERVAL HPI:  10/19/21: Patient was seen and examined at bedside. Denies any complaints. States his shortness of breath has improved.   10/20/21: Patient was seen and examined at bedside. States he was not able to sleep well because of another patient screaming across the room. Otherwise denies worsening SOB, chest pain, palpitations.  10/21/21: Patient was seen and examined at bedside. Patient comfortably having breakfast in bed, alert and awake. States he has no further complaints. Stable Labs Low H/H . Cr stable.  10/22/21: No acute events overnight. Patient seen and examined at bedside. Patient denies any fever, chills, chest pain, dyspnea, abdominal pain, leg pain/swelling, dysuria, constipation, diarhea. Needs TB screening. PPD to be placed.  COVID test prior to discharge stabel labs   10/23/21: Patient was seen and examined at bedside. No acute events overnight. Patient complaining of atypical chest pain and palpitations. STAT ekg ordered- unchanged from prior. Ordered cardiac enzymes- will follow up. Ordered Cardizem 2.5mg x 1. No ACS  10/24/21: Patient was seen and examined at bedside. Denied of any acute complaints. States he had SOB overnight and was put on NC but respiratory status improved and patient was put on RA shortly after.     OVERNIGHT EVENTS: No acute overnight events.     Home Medications:  Lasix 40 mg oral tablet: 1 tab(s) orally once a day (18 Oct 2021 17:52)  Pepcid 20 mg oral tablet: 1 tab(s) orally every other day (18 Oct 2021 17:52)      MEDICATIONS  (STANDING):  amLODIPine   Tablet 10 milliGRAM(s) Oral daily  apixaban 5 milliGRAM(s) Oral every 12 hours  ARIPiprazole 30 milliGRAM(s) Oral daily  aspirin enteric coated 81 milliGRAM(s) Oral daily  atorvastatin 10 milliGRAM(s) Oral at bedtime  budesonide 160 MICROgram(s)/formoterol 4.5 MICROgram(s) Inhaler 2 Puff(s) Inhalation two times a day  cloNIDine 0.1 milliGRAM(s) Oral three times a day  cyanocobalamin 1000 MICROGram(s) Oral daily  diltiazem Injectable 2.5 milliGRAM(s) IV Push once  doxazosin 8 milliGRAM(s) Oral at bedtime  famotidine    Tablet 20 milliGRAM(s) Oral daily  folic acid 1 milliGRAM(s) Oral daily  furosemide    Tablet 60 milliGRAM(s) Oral two times a day  hydrALAZINE 100 milliGRAM(s) Oral three times a day  isosorbide   mononitrate ER Tablet (IMDUR) 30 milliGRAM(s) Oral daily  lamoTRIgine 100 milliGRAM(s) Oral daily  mirtazapine 30 milliGRAM(s) Oral at bedtime  NIFEdipine XL 60 milliGRAM(s) Oral daily  venlafaxine XR. 150 milliGRAM(s) Oral daily    MEDICATIONS  (PRN):      Allergies    No Known Allergies    Intolerances        Social History:  Tobacco: quit in 1980, not active smoker  EtOH: denies   Recreational drug use: cocaine several years ago "a few times" has not used in "many years"  Lives with: [facility]; group home; no nursing assistance; observation is there  Ambulates: independent, denies walker or cane but uses guard railings  ADLs: independent, but states need for assistance in bathing, cooking; independent with feeding, ambulating  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[place]  Vaccinations: Moderna x2 doses last dose in May (18 Oct 2021 17:24)      REVIEW OF SYSTEMS:  CONSTITUTIONAL: No fever, No chills, No fatigue, No myalgia, No Body ache, No Weakness  EYES: No eye pain,  No visual disturbances, No discharge, NO Redness  ENMT:  No ear pain, No nose bleed, No vertigo; No sinus pain, NO throat pain, No Congestion  NECK: No pain, No stiffness  RESPIRATORY: No cough, NO wheezing, No  hemoptysis, NO  shortness of breath  CARDIOVASCULAR:[ x] no chest pain, palpitations  GASTROINTESTINAL: [x] no abdominal pain, NO epigastric pain. No nausea, No vomiting; No diarrhea, No constipation.   GENITOURINARY: No dysuria, No frequency, No urgency, No hematuria, NO incontinence  NEUROLOGICAL: No headaches, No dizziness, No numbness, No tingling, No tremors, No weakness  EXT: No Swelling, No Pain, No Edema  SKIN:  [ x ] No itching, burning, rashes, or lesions   MUSCULOSKELETAL: No joint pain ,No Jt swelling; No muscle pain, No back pain, No extremity pain  PSYCHIATRIC: No depression,  No anxiety,  No mood swings ,No difficulty sleeping at night  PAIN SCALE: [ x ] None  [  ] Other-  ROS Unable to obtain due to - [  ] Dementia  [  ] Lethargy [  ] Drowsy [  ] Sedated [  ] non verbal  REST OF REVIEW Of SYSTEM - [ x ] Normal     Vital Signs Last 24 Hrs  T(C): 36.7 (23 Oct 2021 04:21), Max: 37 (22 Oct 2021 20:10)  T(F): 98 (23 Oct 2021 04:21), Max: 98.6 (22 Oct 2021 20:10)  HR: 78 (23 Oct 2021 08:34) (67 - 78)  BP: 139/65 (23 Oct 2021 08:34) (139/65 - 180/89)  BP(mean): --  RR: 19 (23 Oct 2021 08:34) (18 - 19)  SpO2: 92% (23 Oct 2021 08:34) (91% - 95%)  Finger Stick          PHYSICAL EXAM:  GENERAL:  [ x ] NAD , [ x ] well appearing, [  ] Agitated, [  ] Drowsy,  [  ] Lethargy, [  ] confused   HEAD:  [x  ] Normal, [  ] Other  EYES:  [ x ] EOMI, [  ] PERRLA, [ x ] conjunctiva and sclera clear normal, [  ] Other,  [  ] Pallor,[  ] Discharge  ENMT:  [ x ] Normal, [  ] Moist mucous membranes, [ x ] Good dentition, [ x ] No Thrush  NECK:  [ x ] Supple, [ x ] No JVD, [ x ] Normal thyroid, [  ] Lymphadenopathy [  ] Other  CHEST/LUNG:  [ x ] Clear to auscultation bilaterally, [ x ] Breath Sounds equal B/L  [  ] poor effort  [ x ] No rales, [ x ] No rhonchi  [ x ]  No wheezing,   HEART:  [ x ] Regular rate and rhythm, [  ] tachycardia, [  ] Bradycardia,  [  ] irregular  [ x ] No murmurs, No rubs, No gallops, [  ] PPM in place (Mfr:  )  ABDOMEN:  [ x ] Soft, [x] nontender [ x] Nondistended, [x  ] No mass, [x] Bowel sounds present, [x] obese  NERVOUS SYSTEM:  [x  ] Alert & Oriented X3, [ x ] Nonfocal  [  ] Confusion  [  ] Encephalopathic [  ] Sedated [  ] Unable to assess, [  ] Dementia [  ] Other-  EXTREMITIES: [ x ] 2+ Peripheral Pulses, No clubbing, No cyanosis,  [x  ]  2 = pittingedema B/L lower EXT. [  ] PVD stasis skin changes B/L Lower EXT, [  ] wound  LYMPH: No lymphadenopathy noted  SKIN:  [ x ] No rashes or lesions, [  ] Pressure Ulcers, [  ] ecchymosis, [  ] Skin Tears, [  ] Other    DIET: Diet, Dysphagia 3 Soft-Thin Liquids:   Low Sodium (10-19-21 @ 09:33)      LABS:                        8.5    5.98  )-----------( 273      ( 23 Oct 2021 07:09 )             28.0     23 Oct 2021 07:09    143    |  112    |  39     ----------------------------<  101    3.8     |  24     |  2.40     Ca    8.8        23 Oct 2021 07:09    TPro  6.8    /  Alb  2.8    /  TBili  0.2    /  DBili  x      /  AST  15     /  ALT  20     /  AlkPhos  47     23 Oct 2021 07:09          Culture Results:   <10,000 CFU/mL Normal Urogenital Johnna (10-18 @ 18:35)  Culture Results:   No growth to date. (10-18 @ 16:04)  Culture Results:   No growth to date. (10-18 @ 16:04)          CARDIAC MARKERS ( 19 Oct 2021 02:22 )  .084 ng/mL / x     / x     / x     / x      CARDIAC MARKERS ( 18 Oct 2021 20:09 )  .086 ng/mL / x     / 90 U/L / x     / 1.7 ng/mL  CARDIAC MARKERS ( 18 Oct 2021 11:42 )  .059 ng/mL / x     / x     / x     / x              Culture - Urine (collected 18 Oct 2021 18:35)  Source: Clean Catch Clean Catch (Midstream)  Final Report (19 Oct 2021 14:17):    <10,000 CFU/mL Normal Urogenital Johnna    Culture - Blood (collected 18 Oct 2021 16:04)  Source: .Blood Blood-Peripheral  Preliminary Report (19 Oct 2021 17:02):    No growth to date.    Culture - Blood (collected 18 Oct 2021 16:04)  Source: .Blood Blood-Peripheral  Preliminary Report (19 Oct 2021 17:02):    No growth to date.         Anemia Panel:      Thyroid Panel:            Serum Pro-Brain Natriuretic Peptide: 21222 pg/mL (10-18-21 @ 11:42)      RADIOLOGY & ADDITIONAL TESTS:      HEALTH ISSUES - PROBLEM Dx:  Anemia    CAD (coronary artery disease)  s/p stents (not on plavix)    Depression with anxiety    DVT prophylaxis    Acute exacerbation of CHF (congestive heart failure)    Pneumonia    Hypertensive urgency    Atrial fibrillation  first documented on EKG 10/7/2021    Stage 3 chronic kidney disease    Type 2 diabetes mellitus  not on home insulin    HLD (hyperlipidemia)            Consultant(s) Notes Reviewed:  [x  ] YES     Care Discussed with [X] Consultants  [ x ] Patient  [  ] Family [  ] HCP [  ]   [  ] Social Service  [  ] RN, [  ] Physical Therapy,[  ] Palliative care team  DVT PPX: [  ] Lovenox, [  ] S C Heparin, [  ] Coumadin, [  ] Xarelto, [ x ] Eliquis, [  ] Pradaxa, [  ] IV Heparin drip, [  ] SCD [  ] Contraindication 2 to GI Bleed,[  ] Ambulation [  ] Contraindicated 2 to  bleed [  ] Contraindicated 2 to Brain Bleed  Advanced directive: [ x ] None, [  ] DNR/DNI Patient is a 76y old  Male who presents with a chief complaint of Hypertensive urgency  (21 Oct 2021 13:26)    HPI:  75 yo M with PMHx of Type 2 DM (not on insulin), BPH, CAD s/p stents >4 years ago (not on plavix), diverticulitis (hospitalized 07/2020 at [facility]), GIB 2/2 diverticulosis (2020), anxiety, Lupus, HTN, HLD, CKD stage 3, HepC, 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 Patient states his SOB started on Sunday and has been worse with laying down. Does admit to intermittent CP associated with the SOB and also when getting imaging as that makes him anxious. Does not appear to have a clear understanding of his medical conditions. Also, states he had some bleeding in his mouth on Sunday, but there is no longer bleeding; on exam there is a healing cut on his tongue and inner right cheek with no active bleeding likely from the pt biting his tongue. Admits difficulty laying down straight due to SOB. Denies fevers, chills, abd pain, n/v, sore throat, cough, palpitations  Of note, pt was recently admitted to [facility] on 10/7/21 for hypertensive urgency, Afib and  findings of PNA. was Rxed with IV Zosyn x 7 days  Abx .pt also got 1 u pRBC transfusion past week.    ED Course:   Vitals: BP: 210/96-->181/84, HR: 79, Temp: 98 F, RR: 17, SpO2: 100% on RA-->89% on RA-->98% on 2L NC   Labs: H/H: 9.6/30.6, aPTT: 43, PT/INR: 18.9/1.65, BUN/Cr: 28/2.2, alb: 3.1, GFR: 28, procal: .16, trop: .059, proBNP:10,846  Imaging:   CxR:  Bilateral lung parenchymal airspace opacities, increased from prior. Small pleural effusions suggested  B/l LE Dopplers: No evidence of deep venous thrombosis in either lower extremity.  Bilateral popliteal fossa fluid collections, likely Baker's cysts.  EKG: HR: 74, Atrial fibrillation, Incomplete right bundle branch block, Nonspecific T wave abnormality, Prolonged QT(461)  Received in the ED: Vanco and Zosyn x2, IV lasix 60mg, 10mg Hydralazine IVP x2 (18 Oct 2021 17:24)    INTERVAL HPI:  10/19/21: Patient was seen and examined at bedside. Denies any complaints. States his shortness of breath has improved.   10/20/21: Patient was seen and examined at bedside. States he was not able to sleep well because of another patient screaming across the room. Otherwise denies worsening SOB, chest pain, palpitations.  10/21/21: Patient was seen and examined at bedside. Patient comfortably having breakfast in bed, alert and awake. States he has no further complaints. Stable Labs Low H/H . Cr stable.  10/22/21: No acute events overnight. Patient seen and examined at bedside. Patient denies any fever, chills, chest pain, dyspnea, abdominal pain, leg pain/swelling, dysuria, constipation, diarhea. Needs TB screening. PPD to be placed.  COVID test prior to discharge stabel labs   10/23/21: Patient was seen and examined at bedside. No acute events overnight. Patient complaining of atypical chest pain and palpitations. STAT ekg ordered- unchanged from prior. Ordered cardiac enzymes- will follow up. Ordered Cardizem 2.5mg x 1. No ACS  10/24/21: Patient was seen and examined at bedside. Denied of any acute complaints. States he had SOB overnight and was put on NC but respiratory status improved and patient was put on RA shortly after.     OVERNIGHT EVENTS: No acute overnight events.     Home Medications:  Lasix 40 mg oral tablet: 1 tab(s) orally once a day (18 Oct 2021 17:52)  Pepcid 20 mg oral tablet: 1 tab(s) orally every other day (18 Oct 2021 17:52)      MEDICATIONS  (STANDING):  amLODIPine   Tablet 10 milliGRAM(s) Oral daily  apixaban 5 milliGRAM(s) Oral every 12 hours  ARIPiprazole 30 milliGRAM(s) Oral daily  aspirin enteric coated 81 milliGRAM(s) Oral daily  atorvastatin 10 milliGRAM(s) Oral at bedtime  budesonide 160 MICROgram(s)/formoterol 4.5 MICROgram(s) Inhaler 2 Puff(s) Inhalation two times a day  cloNIDine 0.1 milliGRAM(s) Oral three times a day  cyanocobalamin 1000 MICROGram(s) Oral daily  diltiazem Injectable 2.5 milliGRAM(s) IV Push once  doxazosin 8 milliGRAM(s) Oral at bedtime  famotidine    Tablet 20 milliGRAM(s) Oral daily  folic acid 1 milliGRAM(s) Oral daily  furosemide    Tablet 60 milliGRAM(s) Oral two times a day  hydrALAZINE 100 milliGRAM(s) Oral three times a day  isosorbide   mononitrate ER Tablet (IMDUR) 30 milliGRAM(s) Oral daily  lamoTRIgine 100 milliGRAM(s) Oral daily  mirtazapine 30 milliGRAM(s) Oral at bedtime  NIFEdipine XL 60 milliGRAM(s) Oral daily  venlafaxine XR. 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 No nausea, No vomiting; No diarrhea, No constipation.   GENITOURINARY: No dysuria, No frequency, No urgency, No hematuria, NO incontinence  NEUROLOGICAL: No headaches, No dizziness, No numbness, No tingling, No tremors, No weakness  EXT: No Swelling, No Pain, No Edema  SKIN:  [ x ] No itching, burning, rashes, or lesions   MUSCULOSKELETAL: No joint pain ,No Jt swelling; No muscle pain, No back pain, No extremity pain  PSYCHIATRIC: No depression,  No anxiety,  No mood swings ,No difficulty sleeping at night  PAIN SCALE: [ x ] None  [  ] Other-  ROS Unable to obtain due to - [  ] Dementia  [  ] Lethargy [  ] Drowsy [  ] Sedated [  ] non verbal  REST OF REVIEW Of SYSTEM - [ x ] Normal     Vital Signs Last 24 Hrs  T(C): 36.7 (23 Oct 2021 04:21), Max: 37 (22 Oct 2021 20:10)  T(F): 98 (23 Oct 2021 04:21), Max: 98.6 (22 Oct 2021 20:10)  HR: 78 (23 Oct 2021 08:34) (67 - 78)  BP: 139/65 (23 Oct 2021 08:34) (139/65 - 180/89)  BP(mean): --  RR: 19 (23 Oct 2021 08:34) (18 - 19)  SpO2: 92% (23 Oct 2021 08:34) (91% - 95%)  Finger Stick          PHYSICAL EXAM:  GENERAL:  [ x ] NAD , [ x ] well appearing, [  ] Agitated, [  ] Drowsy,  [  ] Lethargy, [  ] confused   HEAD:  [x  ] Normal, [  ] Other  EYES:  [ x ] EOMI, [ x ] PERRLA, [ x ] conjunctiva and sclera clear normal, [  ] Other,  [  ] Pallor,[  ] Discharge  ENMT:  [ x ] Normal, [ x ] Moist mucous membranes, [ x ] Good dentition, [ x ] No Thrush  NECK:  [ x ] Supple, [ x ] No JVD, [ x ] Normal thyroid, [  ] Lymphadenopathy [  ] Other  CHEST/LUNG:  [ x ] Clear to auscultation bilaterally, [ x ] Breath Sounds equal B/L  [  ] poor effort  [ x ] No rales, [ x ] No rhonchi  [ x ]  No wheezing,   HEART:  [ x ] Regular rate and rhythm, [  ] tachycardia, [  ] Bradycardia,  [  ] irregular  [ x ] No murmurs, No rubs, No gallops, [  ] PPM in place (Mfr:  )  ABDOMEN:  [ x ] Soft, [x] nontender [ x] Nondistended, [x  ] No mass, [x] Bowel sounds present, [x] obese  NERVOUS SYSTEM:  [x  ] Alert & Oriented X3, [ x ] Nonfocal  [  ] Confusion  [  ] Encephalopathic [  ] Sedated [  ] Unable to assess, [  ] Dementia [  ] Other-  EXTREMITIES: [ x ] 2+ Peripheral Pulses, No clubbing, No cyanosis,  [x  ] minimal edema  B/L lower EXT. [  ] PVD stasis skin changes B/L Lower EXT, [  ] wound  LYMPH: No lymphadenopathy noted  SKIN:  [ x ] No rashes or lesions, [  ] Pressure Ulcers, [  ] ecchymosis, [  ] Skin Tears, [ x ] Other    DIET: Diet, Dysphagia 3 Soft-Thin Liquids:   Low Sodium (10-19-21 @ 09:33)      LABS:                        8.8    7.20  )-----------( 257      ( 24 Oct 2021 10:04 )             29.6     24 Oct 2021 10:04    143    |  109    |  41     ----------------------------<  221    3.5     |  23     |  2.70     Ca    8.9        24 Oct 2021 10:04    TPro  7.2    /  Alb  2.7    /  TBili  0.2    /  DBili  x      /  AST  11     /  ALT  23     /  AlkPhos  50     24 Oct 2021 10:04                          8.5    5.98  )-----------( 273      ( 23 Oct 2021 07:09 )             28.0     23 Oct 2021 07:09    143    |  112    |  39     ----------------------------<  101    3.8     |  24     |  2.40     Ca    8.8        23 Oct 2021 07:09    TPro  6.8    /  Alb  2.8    /  TBili  0.2    /  DBili  x      /  AST  15     /  ALT  20     /  AlkPhos  47     23 Oct 2021 07:09          Culture Results:   <10,000 CFU/mL Normal Urogenital Johnna (10-18 @ 18:35)  Culture Results:   No growth to date. (10-18 @ 16:04)  Culture Results:   No growth to date. (10-18 @ 16:04)          CARDIAC MARKERS ( 19 Oct 2021 02:22 )  .084 ng/mL / x     / x     / x     / x      CARDIAC MARKERS ( 18 Oct 2021 20:09 )  .086 ng/mL / x     / 90 U/L / x     / 1.7 ng/mL  CARDIAC MARKERS ( 18 Oct 2021 11:42 )  .059 ng/mL / x     / x     / x     / x              Culture - Urine (collected 18 Oct 2021 18:35)  Source: Clean Catch Clean Catch (Midstream)  Final Report (19 Oct 2021 14:17):    <10,000 CFU/mL Normal Urogenital Johnna    Culture - Blood (collected 18 Oct 2021 16:04)  Source: .Blood Blood-Peripheral  Preliminary Report (19 Oct 2021 17:02):    No growth to date.    Culture - Blood (collected 18 Oct 2021 16:04)  Source: .Blood Blood-Peripheral  Preliminary Report (19 Oct 2021 17:02):    No growth to date.       Serum Pro-Brain Natriuretic Peptide: 64401 pg/mL (10-18-21 @ 11:42)      RADIOLOGY & ADDITIONAL TESTS:      HEALTH ISSUES - PROBLEM Dx:  Anemia    CAD (coronary artery disease)  s/p stents (not on plavix)    Depression with anxiety    DVT prophylaxis    Acute exacerbation of CHF (congestive heart failure)    Pneumonia    Hypertensive urgency    Atrial fibrillation  first documented on EKG 10/7/2021    Stage 3 chronic kidney disease    Type 2 diabetes mellitus  not on home insulin    HLD (hyperlipidemia)            Consultant(s) Notes Reviewed:  [x  ] YES     Care Discussed with [X] Consultants  [ x ] Patient  [  ] Family [  ] HCP [  ]   [  ] Social Service  [ x ] RN, [  ] Physical Therapy,[  ] Palliative care team  DVT PPX: [  ] Lovenox, [  ] S C Heparin, [  ] Coumadin, [  ] Xarelto, [ x ] Eliquis, [  ] Pradaxa, [  ] IV Heparin drip, [  ] SCD [  ] Contraindication 2 to GI Bleed,[  ] Ambulation [  ] Contraindicated 2 to  bleed [  ] Contraindicated 2 to Brain Bleed  Advanced directive: [ x ] None, [  ] DNR/DNI

## 2021-10-24 NOTE — PROGRESS NOTE ADULT - PROBLEM SELECTOR PLAN 1
- Pt presented with SOB worse with laying down and with LE edema Since Sunday  - CxR: Bilateral lung parenchymal airspace opacities, increased from prior. Small pleural effusions suggested  - B/l LE Dopplers: No evidence of deep venous thrombosis in either lower extremity.  - BNP: 63748  - TTE on 10/13/21(previous admission): LVEF 55-60%. LV diastolic function cannot determine due to atrial fibrillation. -- Do not need to repeat at time AC on Chronic Diastolic CHF   - supplemental O2 as needed   - s/p 60mg IVP-stop  - cont Lasix 60mg PO BID  - am CT chest:   1. Small bilateral pleural effusions.  2. Multifocal bilateral pneumonia, organism nonspecific, does not have the characteristic appearance of COVID-19 but is within range of what can be seen with COVID-19 pneumonia.  - Monitor fluid status closely  - Cardio, Dr. Bliss's group, following.  - Pulm following- recommends monitoring off antibiotics at this time.

## 2021-10-24 NOTE — PROGRESS NOTE ADULT - ASSESSMENT
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

## 2021-10-24 NOTE — PROGRESS NOTE ADULT - ATTENDING COMMENTS
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 Pt was already Rxed for PNA with IV Zosyn last week,  Admitted for ac on Chronic Diastolic  CHF exacerbation and HTN urgency.  Pt seen, Examined, case & care plan d/w pt, residents at detail.  -Concern for NON Compliance with meds /supervision at retirement.  Pulmonary-Dr Day follow up  Cardio-DR Monterroso d/w , will review All BP meds, Increase Clonidine 0.2 mg 2x day, Increase Imdur 60 mg daily, STOP Nifidipine XL  -DR Rodarte D/W - follow up  Psych-DR Winkler d/w at detail, patient appears to have limited insight into medication   AM Labs.  PT Eval.---> NO PT need  Social service Eval. d/w at detail, start D/C Plan .   PPD  placed-Rt Forearm- 0 mm Induration -NEG , plan for COVID 19 PCR test in AM for d/c plan   Total care time is 45 minutes.

## 2021-10-24 NOTE — PROGRESS NOTE ADULT - ASSESSMENT
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) living in a group home [facility]    on PO lasix now  PPD read -     vs noted  labs reviewed  on LASIX  for CHF - dose increased -   MBS noted - Dysphagia diet -     completed ABX for PNA  ct chest from recent admission reviewed, cxr noted, repeat CT chest reviewed - multifocal pna reported - however - clinically does not correlate -   would monitor off ABX - biomarkers - cx - monitor VS and Sat  assess - eval for HF - vol overload - Diuresis - I and O - cardio eval - TTE reviewed  ELMER - does not tolerate CPAP  CKD - monitor renal indices - i and o - replete lytes  dysphagia diet - asp prec - HOB elev - oral hygiene  assist with needs - ADL  emotional support  tele monitor  old records reviewed

## 2021-10-24 NOTE — PROGRESS NOTE ADULT - SUBJECTIVE AND OBJECTIVE BOX
Patient is a 76y old  Male who presents with a chief complaint of shortness of breath (19 Oct 2021 09:10)  Patient seen in follow up for CKD 4, fluid overload.        PAST MEDICAL HISTORY:  Hypertension    Diabetes mellitus    Lupus    Hepatitis C    Anxiety and depression    CAD (coronary artery disease)    Diverticulosis    Hyperlipidemia    HTN (hypertension)    HLD (hyperlipidemia)    Atrial fibrillation    CAD (coronary artery disease)    Type 2 diabetes mellitus    Anxiety    History of diverticulitis    Diverticulosis    Afib    Stage 3 chronic kidney disease    Anemia of chronic disease    Chronic diastolic congestive heart failure    Multiple thyroid nodules      MEDICATIONS  (STANDING):  amLODIPine   Tablet 10 milliGRAM(s) Oral daily  apixaban 5 milliGRAM(s) Oral every 12 hours  ARIPiprazole 30 milliGRAM(s) Oral daily  aspirin enteric coated 81 milliGRAM(s) Oral daily  atorvastatin 10 milliGRAM(s) Oral at bedtime  budesonide 160 MICROgram(s)/formoterol 4.5 MICROgram(s) Inhaler 2 Puff(s) Inhalation two times a day  cloNIDine 0.2 milliGRAM(s) Oral every 12 hours  cyanocobalamin 1000 MICROGram(s) Oral daily  doxazosin 8 milliGRAM(s) Oral at bedtime  famotidine    Tablet 20 milliGRAM(s) Oral daily  folic acid 1 milliGRAM(s) Oral daily  furosemide    Tablet 60 milliGRAM(s) Oral two times a day  hydrALAZINE 100 milliGRAM(s) Oral three times a day  isosorbide   mononitrate ER Tablet (IMDUR) 60 milliGRAM(s) Oral daily  lamoTRIgine 100 milliGRAM(s) Oral daily  mirtazapine 30 milliGRAM(s) Oral at bedtime  NIFEdipine XL 60 milliGRAM(s) Oral daily  venlafaxine XR. 150 milliGRAM(s) Oral daily    MEDICATIONS  (PRN):    T(C): 36.8 (10-24-21 @ 11:40), Max: 37.1 (10-24-21 @ 04:51)  HR: 74 (10-24-21 @ 11:40) (62 - 78)  BP: 171/82 (10-24-21 @ 11:40) (139/65 - 186/87)  RR: 18 (10-24-21 @ 11:40)  SpO2: 94% (10-24-21 @ 11:40)  Wt(kg): --  I&O's Detail    23 Oct 2021 07:01  -  24 Oct 2021 07:00  --------------------------------------------------------  IN:  Total IN: 0 mL    OUT:    Voided (mL): 500 mL  Total OUT: 500 mL    Total NET: -500 mL      24 Oct 2021 07:01  -  24 Oct 2021 14:00  --------------------------------------------------------  IN:  Total IN: 0 mL    OUT:    Voided (mL): 600 mL  Total OUT: 600 mL    Total NET: -600 mL                PHYSICAL EXAM:  General: no distress  Respiratory: b/l air entry  Cardiovascular: S1 S2  Gastrointestinal: soft  Extremities: + edema            LABORATORY:                        8.8    7.20  )-----------( 257      ( 24 Oct 2021 10:04 )             29.6     10-24    143  |  109<H>  |  41<H>  ----------------------------<  221<H>  3.5   |  23  |  2.70<H>    Ca    8.9      24 Oct 2021 10:04    TPro  7.2  /  Alb  2.7<L>  /  TBili  0.2  /  DBili  x   /  AST  11<L>  /  ALT  23  /  AlkPhos  50  10-24    Sodium, Serum: 143 mmol/L (10-24 @ 10:04)  Sodium, Serum: 143 mmol/L (10-23 @ 07:09)    Potassium, Serum: 3.5 mmol/L (10-24 @ 10:04)  Potassium, Serum: 3.8 mmol/L (10-23 @ 07:09)    Hemoglobin: 8.8 g/dL (10-24 @ 10:04)  Hemoglobin: 8.5 g/dL (10-23 @ 07:09)  Hemoglobin: 9.0 g/dL (10-22 @ 09:12)    Creatinine, Serum 2.70 (10-24 @ 10:04)  Creatinine, Serum 2.40 (10-23 @ 07:09)  Creatinine, Serum 2.60 (10-22 @ 09:12)    CARDIAC MARKERS ( 23 Oct 2021 14:13 )  .022 ng/mL / x     / 40 U/L / x     / 1.4 ng/mL  CARDIAC MARKERS ( 23 Oct 2021 09:42 )  .034 ng/mL / x     / 44 U/L / x     / 1.6 ng/mL      LIVER FUNCTIONS - ( 24 Oct 2021 10:04 )  Alb: 2.7 g/dL / Pro: 7.2 g/dL / ALK PHOS: 50 U/L / ALT: 23 U/L / AST: 11 U/L / GGT: x

## 2021-10-24 NOTE — PROGRESS NOTE ADULT - ASSESSMENT
77 yo M with PMHx of Type 2 DM (not on insulin), BPH, CAD s/p stents >4 years ago (not on plavix), diverticulitis (hospitalized 07/2020 at 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 today with shortness of breath, chest pain that started upon discharge 2 days ago with complaints of oral bleed that started today. Cardiology consulted for SOB and chest pain    Pleural effusion  - CT chest showed small B/L pleural effusions and multifocal Pna s/p Abx.  No O2 requirement  - ECHO LVEF 55-60%. LV diastolic function cannot determine due to atrial fibrillation. Mild mitral regurgitation is present. Mild aortic stenosis is  present. Mild tricuspid regurgitation is present . No evidence of any pulmonary hypertension  - BNP: 45240 S/P Lasix IV 60mg Q12  - Appears compensated, Lasix transitioned to 60 mg po BID  - Daily weight,. Monitor Strict I/O's, net negative 3 liters  - Monitor and replete lytes, keep K>4, Mg>2.    Chest pain (Atypical)   - Patient with hx of CAD with stent placement  - Patient presented with chest discomfort.  Resolved  - EKG showed Afib, no ischemic changes noted  - Mild troponin leak likely in setting of ARIELLA, peaked no need to further trend  - No clinical evidence of ACS  - Continue with ASA  - Continue statin     Afib  - Rate-controlled   - Continue Eliquis  - Monitor electrolytes, replete to keep K>4 and Mag>2    Hypertensive Urgency   - On admission /90/ given hydralazine in ED 10 mg IV X2 doses  -- BP: 186/87 (10-24-21 @ 04:51) (141/75 - 186/87) mostly controlled, one outlier as BP taken before AM meds, would recheck  - Continue clonidine 0.1 TID, Hydralazine 100 mg q 8 hrs, Amlodipine 10 mg qd, Nifedipine XL 60 mg, and Imdur 30mg   - Can increase Imdur to 60mg Qday if needed for BP elevation    - All other medical needs as per primary team.  - Other cardiovascular workup will depend on clinical course.  - Will continue to follow.    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 Cardiology consulted for SOB and chest pain    Pleural effusion  - CT chest showed small B/L pleural effusions and multifocal Pna s/p Abx.  No O2 requirement  - ECHO LVEF 55-60%. LV diastolic function cannot determine due to atrial fibrillation. Mild mitral regurgitation is present. Mild aortic stenosis is  present. Mild tricuspid regurgitation is present . No evidence of any pulmonary hypertension  - BNP: 07923 S/P Lasix IV 60mg Q12  - Appears compensated, Lasix transitioned to 60 mg po BID  - Daily weight,. Monitor Strict I/O's, net negative 3 liters  - Monitor and replete lytes, keep K>4, Mg>2.    Chest pain (Atypical)   - Patient with hx of CAD with stent placement  - Patient presented with chest discomfort.  Resolved  - EKG showed Afib, no ischemic changes noted  - Mild troponin leak likely in setting of ARIELLA, peaked no need to further trend  - No clinical evidence of ACS  - Continue with ASA  - Continue statin     Afib  - Rate-controlled   - Continue Eliquis  - Monitor electrolytes, replete to keep K>4 and Mag>2    Hypertensive Urgency   - On admission /90/ given hydralazine in ED 10 mg IV X2 doses  -- BP: 186/87 (10-24-21 @ 04:51) (141/75 - 186/87) mostly controlled, one outlier as BP taken before AM meds, would recheck  - Continue clonidine 0.1 TID, Hydralazine 100 mg q 8 hrs, Amlodipine 10 mg qd, Nifedipine XL 60 mg (not clear why he has been on 2 ccbs), and Imdur 30mg   - Can increase Imdur to 60mg Qday if needed for BP elevation    - All other medical needs as per primary team.  - Other cardiovascular workup will depend on clinical course.  - Will continue to follow.    Bernice Arora, MS ANP, [facility]  Nurse Practitioner- Cardiology   Spectra #[phone]

## 2021-10-24 NOTE — PROGRESS NOTE ADULT - ASSESSMENT
CKD 4  Diabetes  Dyspnea, Fluid overload  Hypertension  h/o SLE  Hypokalemia  Anemia    Stable renal indices. On PO lasix. Retacrit for anemia. Monitor blood sugar levels. Insulin coverage as needed.   Monitor h/h trend. Potassium supplementation. Monitor BP trend. Titrate BP meds as needed. Salt restriction.   Will follow electrolytes and renal function trend. Avoid nephrotoxic meds as possible.

## 2021-10-24 NOTE — PROGRESS NOTE ADULT - SUBJECTIVE AND OBJECTIVE BOX
[facility] -- Fifi Bliss, Kalpesh Valdovinos, Abraham Sheridan Savella, Goodger: Office # [phone]    Follow Up: Hypertensive Urgency AFib Chest pain      Subjective/Observations: Patient seen and examined. Patient awake, alert, resting comfortably in bed. No complaints of chest pain, dyspnea, palpitations or dizziness. Tolerating room air.     REVIEW OF SYSTEMS: All review of systems is negative for eye, ENT, GI, , allergic, dermatologic, musculoskeletal and neurologic except as described above    PAST MEDICAL & SURGICAL HISTORY:  HTN (hypertension)  c/b multiple episodes of hypertensive urgency    HLD (hyperlipidemia)    Atrial fibrillation  first documented on EKG 10/7/2021    CAD (coronary artery disease)  s/p stents (not on plavix)    Type 2 diabetes mellitus  not on home insulin/ Meds    Anxiety  Depression  multiple psych medications    History of diverticulitis  07/2021    Diverticulosis  c/b GIB in 2020    Afib  on AC    Stage 3 chronic kidney disease    Anemia of chronic disease  Monoclonal Gammopathy-MGUS  pRBC transfusion 10/15/21    Chronic diastolic congestive heart failure    Multiple thyroid nodules    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  Had surgery ( April 2013 )    S/P tonsillectomy    S/P arthroscopic knee surgery  Bilateral ( 2005 )    Torsion of testicle  Had surgery at age 14    Pilonidal cyst  Had surgery ( 1969 )    S/P cataract surgery  Bilateral    H/O hernia repair        MEDICATIONS  (STANDING):  amLODIPine   Tablet 10 milliGRAM(s) Oral daily  apixaban 5 milliGRAM(s) Oral every 12 hours  ARIPiprazole 30 milliGRAM(s) Oral daily  aspirin enteric coated 81 milliGRAM(s) Oral daily  atorvastatin 10 milliGRAM(s) Oral at bedtime  budesonide 160 MICROgram(s)/formoterol 4.5 MICROgram(s) Inhaler 2 Puff(s) Inhalation two times a day  cloNIDine 0.1 milliGRAM(s) Oral three times a day  cyanocobalamin 1000 MICROGram(s) Oral daily  doxazosin 8 milliGRAM(s) Oral at bedtime  famotidine    Tablet 20 milliGRAM(s) Oral daily  folic acid 1 milliGRAM(s) Oral daily  furosemide    Tablet 60 milliGRAM(s) Oral two times a day  hydrALAZINE 100 milliGRAM(s) Oral three times a day  isosorbide   mononitrate ER Tablet (IMDUR) 30 milliGRAM(s) Oral daily  lamoTRIgine 100 milliGRAM(s) Oral daily  mirtazapine 30 milliGRAM(s) Oral at bedtime  NIFEdipine XL 60 milliGRAM(s) Oral daily  venlafaxine XR. 150 milliGRAM(s) Oral daily    MEDICATIONS  (PRN):    Allergies    No Known Allergies    Intolerances      Vital Signs Last 24 Hrs  T(C): 37.1 (24 Oct 2021 04:51), Max: 37.1 (24 Oct 2021 04:51)  T(F): 98.8 (24 Oct 2021 04:51), Max: 98.8 (24 Oct 2021 04:51)  HR: 72 (24 Oct 2021 04:51) (62 - 72)  BP: 186/87 (24 Oct 2021 04:51) (141/75 - 186/87)  BP(mean): --  RR: 18 (24 Oct 2021 04:51) (16 - 18)  SpO2: 92% (24 Oct 2021 04:51) (92% - 94%)  I&O's Summary    23 Oct 2021 07:01  -  24 Oct 2021 07:00  --------------------------------------------------------  IN: 0 mL / OUT: 500 mL / NET: -500 mL    24 Oct 2021 07:01  -  24 Oct 2021 10:51  --------------------------------------------------------  IN: 0 mL / OUT: 600 mL / NET: -600 mL          TELE: AFib 60-70  PHYSICAL EXAM:  Appearance: NAD, no distress, alert, Well developed   HEENT: Moist Mucous Membranes, Anicteric  Cardiovascular: Irregular rate and rhythm, Normal S1 S2, No JVD, + systolic murmur, No rubs, gallops or clicks  Respiratory: Non-labored, Clear to auscultation, No rales, No rhonchi, No wheezing.   Gastrointestinal:  Soft, Non-tender, + BS  Neurologic: Non-focal  Skin: Warm and dry, No visible rashes or ulcers, No ecchymosis, No cyanosis  Musculoskeletal: No clubbing, No cyanosis, No joint swelling/tenderness  Psychiatry: Mood & affect appropriate  Lymph: No peripheral edema.     LABS: All Labs Reviewed:                        8.8    7.20  )-----------( 257      ( 24 Oct 2021 10:04 )             29.6                         8.5    5.98  )-----------( 273      ( 23 Oct 2021 07:09 )             28.0                         9.0    7.33  )-----------( 306      ( 22 Oct 2021 09:12 )             29.7     23 Oct 2021 07:09    143    |  112    |  39     ----------------------------<  101    3.8     |  24     |  2.40   22 Oct 2021 09:12    142    |  109    |  38     ----------------------------<  136    3.5     |  25     |  2.60     Ca    8.8        23 Oct 2021 07:09  Ca    8.8        22 Oct 2021 09:12    TPro  6.8    /  Alb  2.8    /  TBili  0.2    /  DBili  x      /  AST  15     /  ALT  20     /  AlkPhos  47     23 Oct 2021 07:09  TPro  7.3    /  Alb  2.9    /  TBili  0.3    /  DBili  x      /  AST  15     /  ALT  21     /  AlkPhos  50     22 Oct 2021 09:12      Creatine Kinase, Serum: 40 U/L (10-23-21 @ 14:13)  Troponin I, Serum: .022 ng/mL (10-23-21 @ 14:13)  Creatine Kinase, Serum: 44 U/L (10-23-21 @ 09:42)  Cholesterol, Serum: 174 mg/dL (10-07-21 @ 11:54)  HDL Cholesterol, Serum: 42 mg/dL (10-07-21 @ 11:54)  Triglycerides, Serum: 160 mg/dL (10-07-21 @ 11:54)      12 Lead ECG:   Ventricular Rate 87 BPM    Atrial Rate 166 BPM    QRS Duration 110 ms    Q-T Interval 392 ms    QTC Calculation(Bazett) 471 ms    R Axis 4 degrees    T Axis 15 degrees    Diagnosis Line Atrial fibrillation  Nonspecific ST and T wave abnormality  Prolonged QT  Abnormal ECG  Confirmed by lynne Monterroso ([id number]) on 10/23/2021 3:39:52 PM (10-23-21 @ 08:43)    < from: TTE Echo Complete w/o Contrast w/ Doppler (10.13.21 @ 09:51) >   EXAM:  ECHO TTE WO CON COMP W DOPP    PROCEDURE DATE:  10/13/2021    INTERPRETATION:  Ordering Physician: SKYLA TERRY [id number]  Indication: Cardiac arrhythmias.  Technician: INDIGO  Study Quality: Technical difficult study.  A complete echocardiographic study was performed utilizing standard protocol including spectral and color Doppler in all echocardiographic windows.  Height: 172cm  Weight: 102kg  BSA: 2.1m2  Blood Pressure: 150/90  MEASUREMENTS  IVS: 1.3cm  PWT: 1.3cm  LA: 4.2cm  AO: 3.5cm  LVIDd: 5.4 cm.  LVIDs: 3.5cm  LVEF: 55-60%.  PASP: 34mm Hg  FINDINGS  Left Ventricle: Normal in  size and normal LV systolic function with estimated LVEF 55-60%.  Right Ventricle: Normal in size and normal RV systolic function.  Left Atrium: Mild dilated.  Right Atrium: Normal in size.  Mitral Valve: Mild mitral annular calcification is present. Mitral valve is mildly calcified and no evidence of any mitral stenosis. Mild mitral regurgitation is present.  Aortic Valve: Aortic root is mildsclerotic and of normal dimension. Aortic valve is mildly calcified and mild  aortic stenosis is present with peak gradient across aortic valve is 30 mmHg. Aortic valve area not calculated.  Tricuspid Valve: Mild tricuspid regurgitation with calculated PA systolic pressure 34 mmHg is present. No evidence of any pulmonary hypertension  Pulmonic Valve: Trace Pulmonary regurgitation is present.  Diastolic Function: LV diastolic function cannot determine due to  underlying atrial fibrillation.  Pericardium/Pleura: No evidence of any pericardial effusion.  No intracardiac mass  thrombus or vegetations and  tumor.  Mild concentric left ventricular hypertrophy is present.  IMPRESSION:  Normal LV size with normal LV systolic function with estimated LVEF 55-60%.  LV diastolic function cannot determine due to atrial fibrillation.  Mild mitral regurgitation is present.  Mild aortic stenosis is  present.  Mild tricuspid regurgitation is present . No evidence of any pulmonary hypertension  Correlate clinically above findings.  --- End of Report ---    < end of copied text >    < from: CT Chest No Cont (10.18.21 @ 19:26) >  EXAM:  CT CHEST                        PROCEDURE DATE:  10/18/2021    INTERPRETATION:  PROCEDURE INFORMATION:  Exam: CT Chest Without Contrast; Diagnostic  Exam date and time: 10/18/2021 7:20 PM  Age: 76 years old  Clinical indication: Shortness of breath  TECHNIQUE:  Imaging protocol: Diagnostic computed tomography of the chest without contrast.  COMPARISON:  CT CHEST 10/6/2021  FINDINGS:  Lungs: Multifocal bilateral pneumonia, organism nonspecific, does not have the characteristic appearance of COVID-19 but is within range of what can be seen with COVID-19 pneumonia.  Pleural spaces: Small bilateral pleural effusions.  Heart: Cardiomegaly.  Aorta: Atherosclerotic calcifications of thoracic aorta and coronary arteries. No aortic aneurysm.  Lymph nodes: Unremarkable. No enlarged lymph nodes.  Bones/joints: Degenerative changes. No acute fracture.  Soft tissues: Unremarkable.  Upper abdomen: Right renal cysts. Nonspecific left adrenal thickening. Colonic diverticulosis.  IMPRESSION:  1. Small bilateral pleural effusions.  2. Multifocal bilateral pneumonia, organism nonspecific, does not have the characteristic appearance of COVID-19 but is within range of what can be seen with COVID-19 pneumonia.  --- End of Report ---  < end of copied text >    < from: US Duplex Venous Lower Ext Complete, Bilateral (10.18.21 @ 14:03) >  IMPRESSION:  No evidence of deep venous thrombosis in either lower extremity.  Bilateral popliteal fossa fluid collections, likely Baker's cysts.  --- End of Report ---  < end of copied text >

## 2021-10-25 LAB
ALBUMIN SERPL ELPH-MCNC: 2.9 G/DL — LOW (ref 3.3–5)
ALP SERPL-CCNC: 50 U/L — SIGNIFICANT CHANGE UP (ref 40–120)
ALT FLD-CCNC: 22 U/L — SIGNIFICANT CHANGE UP (ref 12–78)
ANION GAP SERPL CALC-SCNC: 8 MMOL/L — SIGNIFICANT CHANGE UP (ref 5–17)
AST SERPL-CCNC: 17 U/L — SIGNIFICANT CHANGE UP (ref 15–37)
BASOPHILS # BLD AUTO: 0.03 K/UL — SIGNIFICANT CHANGE UP (ref 0–0.2)
BASOPHILS NFR BLD AUTO: 0.4 % — SIGNIFICANT CHANGE UP (ref 0–2)
BILIRUB SERPL-MCNC: 0.3 MG/DL — SIGNIFICANT CHANGE UP (ref 0.2–1.2)
BUN SERPL-MCNC: 41 MG/DL — HIGH (ref 7–23)
CALCIUM SERPL-MCNC: 9.7 MG/DL — SIGNIFICANT CHANGE UP (ref 8.5–10.1)
CHLORIDE SERPL-SCNC: 108 MMOL/L — SIGNIFICANT CHANGE UP (ref 96–108)
CO2 SERPL-SCNC: 26 MMOL/L — SIGNIFICANT CHANGE UP (ref 22–31)
CREAT SERPL-MCNC: 2.3 MG/DL — HIGH (ref 0.5–1.3)
EOSINOPHIL # BLD AUTO: 0.22 K/UL — SIGNIFICANT CHANGE UP (ref 0–0.5)
EOSINOPHIL NFR BLD AUTO: 2.9 % — SIGNIFICANT CHANGE UP (ref 0–6)
GLUCOSE SERPL-MCNC: 112 MG/DL — HIGH (ref 70–99)
HCT VFR BLD CALC: 31.3 % — LOW (ref 39–50)
HGB BLD-MCNC: 9.4 G/DL — LOW (ref 13–17)
IMM GRANULOCYTES NFR BLD AUTO: 0.4 % — SIGNIFICANT CHANGE UP (ref 0–1.5)
LYMPHOCYTES # BLD AUTO: 1.18 K/UL — SIGNIFICANT CHANGE UP (ref 1–3.3)
LYMPHOCYTES # BLD AUTO: 15.6 % — SIGNIFICANT CHANGE UP (ref 13–44)
MCHC RBC-ENTMCNC: 26 PG — LOW (ref 27–34)
MCHC RBC-ENTMCNC: 30 GM/DL — LOW (ref 32–36)
MCV RBC AUTO: 86.5 FL — SIGNIFICANT CHANGE UP (ref 80–100)
MONOCYTES # BLD AUTO: 0.41 K/UL — SIGNIFICANT CHANGE UP (ref 0–0.9)
MONOCYTES NFR BLD AUTO: 5.4 % — SIGNIFICANT CHANGE UP (ref 2–14)
NEUTROPHILS # BLD AUTO: 5.7 K/UL — SIGNIFICANT CHANGE UP (ref 1.8–7.4)
NEUTROPHILS NFR BLD AUTO: 75.3 % — SIGNIFICANT CHANGE UP (ref 43–77)
NRBC # BLD: 0 /100 WBCS — SIGNIFICANT CHANGE UP (ref 0–0)
PLATELET # BLD AUTO: 278 K/UL — SIGNIFICANT CHANGE UP (ref 150–400)
POTASSIUM SERPL-MCNC: 3.5 MMOL/L — SIGNIFICANT CHANGE UP (ref 3.5–5.3)
POTASSIUM SERPL-SCNC: 3.5 MMOL/L — SIGNIFICANT CHANGE UP (ref 3.5–5.3)
PROT SERPL-MCNC: 7.3 G/DL — SIGNIFICANT CHANGE UP (ref 6–8.3)
RBC # BLD: 3.62 M/UL — LOW (ref 4.2–5.8)
RBC # FLD: 17.1 % — HIGH (ref 10.3–14.5)
SODIUM SERPL-SCNC: 142 MMOL/L — SIGNIFICANT CHANGE UP (ref 135–145)
WBC # BLD: 7.57 K/UL — SIGNIFICANT CHANGE UP (ref 3.8–10.5)
WBC # FLD AUTO: 7.57 K/UL — SIGNIFICANT CHANGE UP (ref 3.8–10.5)

## 2021-10-25 PROCEDURE — 99232 SBSQ HOSP IP/OBS MODERATE 35: CPT

## 2021-10-25 PROCEDURE — 99231 SBSQ HOSP IP/OBS SF/LOW 25: CPT

## 2021-10-25 RX ORDER — PREGABALIN 225 MG/1
1 CAPSULE ORAL
Qty: 0 | Refills: 0 | DISCHARGE
Start: 2021-10-25

## 2021-10-25 RX ORDER — FAMOTIDINE 10 MG/ML
1 INJECTION INTRAVENOUS
Qty: 0 | Refills: 0 | DISCHARGE
Start: 2021-10-25

## 2021-10-25 RX ORDER — APIXABAN 2.5 MG/1
1 TABLET, FILM COATED ORAL
Qty: 0 | Refills: 0 | DISCHARGE
Start: 2021-10-25

## 2021-10-25 RX ORDER — POTASSIUM CHLORIDE 20 MEQ
40 PACKET (EA) ORAL ONCE
Refills: 0 | Status: COMPLETED | OUTPATIENT
Start: 2021-10-25 | End: 2021-10-25

## 2021-10-25 RX ORDER — MIRTAZAPINE 45 MG/1
1 TABLET, ORALLY DISINTEGRATING ORAL
Qty: 0 | Refills: 0 | DISCHARGE
Start: 2021-10-25

## 2021-10-25 RX ORDER — FOLIC ACID 0.8 MG
1 TABLET ORAL
Qty: 0 | Refills: 0 | DISCHARGE
Start: 2021-10-25

## 2021-10-25 RX ORDER — DOXAZOSIN MESYLATE 4 MG
1 TABLET ORAL
Qty: 0 | Refills: 0 | DISCHARGE
Start: 2021-10-25

## 2021-10-25 RX ORDER — AMLODIPINE BESYLATE 2.5 MG/1
1 TABLET ORAL
Qty: 0 | Refills: 0 | DISCHARGE
Start: 2021-10-25

## 2021-10-25 RX ORDER — FAMOTIDINE 10 MG/ML
1 INJECTION INTRAVENOUS
Qty: 0 | Refills: 0 | DISCHARGE

## 2021-10-25 RX ORDER — FUROSEMIDE 40 MG
3 TABLET ORAL
Qty: 0 | Refills: 0 | DISCHARGE
Start: 2021-10-25

## 2021-10-25 RX ORDER — ASPIRIN/CALCIUM CARB/MAGNESIUM 324 MG
1 TABLET ORAL
Qty: 0 | Refills: 0 | DISCHARGE
Start: 2021-10-25

## 2021-10-25 RX ORDER — FUROSEMIDE 40 MG
1 TABLET ORAL
Qty: 0 | Refills: 0 | DISCHARGE

## 2021-10-25 RX ORDER — ARIPIPRAZOLE 15 MG/1
1 TABLET ORAL
Qty: 0 | Refills: 0 | DISCHARGE
Start: 2021-10-25

## 2021-10-25 RX ORDER — VENLAFAXINE HCL 75 MG
1 CAPSULE, EXT RELEASE 24 HR ORAL
Qty: 0 | Refills: 0 | DISCHARGE
Start: 2021-10-25

## 2021-10-25 RX ORDER — ATORVASTATIN CALCIUM 80 MG/1
1 TABLET, FILM COATED ORAL
Qty: 0 | Refills: 0 | DISCHARGE
Start: 2021-10-25

## 2021-10-25 RX ORDER — BUDESONIDE AND FORMOTEROL FUMARATE DIHYDRATE 160; 4.5 UG/1; UG/1
0 AEROSOL RESPIRATORY (INHALATION)
Qty: 0 | Refills: 0 | DISCHARGE
Start: 2021-10-25

## 2021-10-25 RX ORDER — ISOSORBIDE MONONITRATE 60 MG/1
1 TABLET, EXTENDED RELEASE ORAL
Qty: 0 | Refills: 0 | DISCHARGE
Start: 2021-10-25

## 2021-10-25 RX ORDER — HYDRALAZINE HCL 50 MG
1 TABLET ORAL
Qty: 0 | Refills: 0 | DISCHARGE
Start: 2021-10-25

## 2021-10-25 RX ORDER — LAMOTRIGINE 25 MG/1
1 TABLET, ORALLY DISINTEGRATING ORAL
Qty: 0 | Refills: 0 | DISCHARGE
Start: 2021-10-25

## 2021-10-25 RX ADMIN — FAMOTIDINE 20 MILLIGRAM(S): 10 INJECTION INTRAVENOUS at 13:10

## 2021-10-25 RX ADMIN — ATORVASTATIN CALCIUM 10 MILLIGRAM(S): 80 TABLET, FILM COATED ORAL at 22:09

## 2021-10-25 RX ADMIN — Medication 150 MILLIGRAM(S): at 13:10

## 2021-10-25 RX ADMIN — Medication 0.2 MILLIGRAM(S): at 05:15

## 2021-10-25 RX ADMIN — Medication 8 MILLIGRAM(S): at 22:09

## 2021-10-25 RX ADMIN — ARIPIPRAZOLE 30 MILLIGRAM(S): 15 TABLET ORAL at 13:10

## 2021-10-25 RX ADMIN — Medication 1 MILLIGRAM(S): at 13:11

## 2021-10-25 RX ADMIN — Medication 81 MILLIGRAM(S): at 13:10

## 2021-10-25 RX ADMIN — Medication 100 MILLIGRAM(S): at 05:15

## 2021-10-25 RX ADMIN — Medication 100 MILLIGRAM(S): at 22:09

## 2021-10-25 RX ADMIN — APIXABAN 5 MILLIGRAM(S): 2.5 TABLET, FILM COATED ORAL at 18:24

## 2021-10-25 RX ADMIN — Medication 0.2 MILLIGRAM(S): at 18:25

## 2021-10-25 RX ADMIN — ISOSORBIDE MONONITRATE 60 MILLIGRAM(S): 60 TABLET, EXTENDED RELEASE ORAL at 13:10

## 2021-10-25 RX ADMIN — MIRTAZAPINE 30 MILLIGRAM(S): 45 TABLET, ORALLY DISINTEGRATING ORAL at 22:09

## 2021-10-25 RX ADMIN — BUDESONIDE AND FORMOTEROL FUMARATE DIHYDRATE 2 PUFF(S): 160; 4.5 AEROSOL RESPIRATORY (INHALATION) at 06:45

## 2021-10-25 RX ADMIN — LAMOTRIGINE 100 MILLIGRAM(S): 25 TABLET, ORALLY DISINTEGRATING ORAL at 13:10

## 2021-10-25 RX ADMIN — APIXABAN 5 MILLIGRAM(S): 2.5 TABLET, FILM COATED ORAL at 05:15

## 2021-10-25 RX ADMIN — AMLODIPINE BESYLATE 10 MILLIGRAM(S): 2.5 TABLET ORAL at 05:15

## 2021-10-25 RX ADMIN — Medication 40 MILLIEQUIVALENT(S): at 14:18

## 2021-10-25 RX ADMIN — Medication 60 MILLIGRAM(S): at 05:15

## 2021-10-25 RX ADMIN — PREGABALIN 1000 MICROGRAM(S): 225 CAPSULE ORAL at 13:11

## 2021-10-25 RX ADMIN — BUDESONIDE AND FORMOTEROL FUMARATE DIHYDRATE 2 PUFF(S): 160; 4.5 AEROSOL RESPIRATORY (INHALATION) at 18:25

## 2021-10-25 RX ADMIN — Medication 60 MILLIGRAM(S): at 18:24

## 2021-10-25 RX ADMIN — Medication 100 MILLIGRAM(S): at 13:14

## 2021-10-25 NOTE — BH CONSULTATION LIAISON PROGRESS NOTE - NSBHFUPINTERVALHXFT_PSY_A_CORE
Patient seen, evaluated and chart reviewed. Patient presents as calm and cooperative, but continues to have limited insight into him medical situation.

## 2021-10-25 NOTE — BH CONSULTATION LIAISON PROGRESS NOTE - NSBHCHARTREVIEWLAB_PSY_A_CORE FT
9.4    7.57  )-----------( 278      ( 25 Oct 2021 07:42 )             31.3   10-25    142  |  108  |  41<H>  ----------------------------<  112<H>  3.5   |  26  |  2.30<H>    Ca    9.7      25 Oct 2021 07:42    TPro  7.3  /  Alb  2.9<L>  /  TBili  0.3  /  DBili  x   /  AST  17  /  ALT  22  /  AlkPhos  50  10-25

## 2021-10-25 NOTE — PROGRESS NOTE ADULT - ASSESSMENT
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 Cardiology consulted for SOB and chest pain    Pleural effusion  - CT chest showed small B/L pleural effusions and multifocal Pna s/p Abx.  Has been on RA  - ECHO LVEF 55-60%. LV diastolic function cannot determine due to atrial fibrillation. Mild mitral regurgitation is present. Mild aortic stenosis is  present. Mild tricuspid regurgitation is present . No evidence of any pulmonary hypertension  - BNP: 45431 S/P Lasix IV 60mg Q12  - He is euvolemic.  Continue PO Lasix.  Net neg 2.4L  - Daily weight,. Monitor Strict I/O's, net negative 3 liters  - Monitor and replete lytes, keep K>4, Mg>2.    Chest pain (Atypical)   - Patient with hx of CAD s/p stents  - EKG showed Afib, no ischemic changes noted  - Mild troponin leak likely in setting of ARIELLA, peaked and trended down  - No clinical evidence of ACS  - Continue with ASA  - Continue statin     Afib  - Rate-controlled.  Not on any AVN blocker  - Continue Eliquis  - Monitor electrolytes, replete to keep K>4 and Mag>2    Hypertensive Urgency   - Presented with /90 s/p Hydralazine in ED 10 mg IV X2 doses  - BP improved, though, still not well-controlled at systolic 150's-160's  - Continue Hydralazine 100 mg q8H  - Imdur and Clonidine increased yesterday  - Can continue to increase Imdur if needed    - All other medical needs as per primary team.  - Other cardiovascular workup will depend on clinical course.  Stable from cardiac standpoint  - Will continue to follow.    Shilpa Kirkpatrick DNP, NP-C  Cardiology   Spectra #[phone]

## 2021-10-25 NOTE — PROGRESS NOTE ADULT - ATTENDING COMMENTS
Appears euvolemic with no signs of heart failure.  Continue to optimize BP regimen.  TO follow while admitted.

## 2021-10-25 NOTE — PROGRESS NOTE ADULT - ATTENDING COMMENTS
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 presenting to the ED with SOB. Pt was already Rxed for PNA with IV Zosyn last week,  Admitted for ac on Chronic Diastolic  CHF exacerbation and HTN urgency.  Pt seen, Examined, case & care plan d/w pt, residents at detail.  -Concern for NON Compliance with meds /supervision at [facility].  Pulmonary-Dr Day follow up  Cardio-DR Monterroso d/w , will review All BP meds, Increase Clonidine 0.2 mg 2x day, Increase Imdur 60 mg daily, STOP Nifidipine XL  -DR Rodarte D/W - follow up  Psych-DR Winkler d/w at detail, patient appears to have limited insight into medication   AM Labs.  PT Eval.---> NO PT need  Social service Eval. d/w at detail, start D/C Plan .   PPD  placed-Rt Forearm- 0 mm Induration -NEG , plan for COVID 19 PCR test in AM for d/c plan   Total care time is 45 minutes. , pt is stable for d/c

## 2021-10-25 NOTE — PROGRESS NOTE ADULT - ASSESSMENT
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) living in a group home [facility]    clonidine and imdur added for BP control  on lasix  vs noted  PPD read neg   dc planning -     completed ABX for PNA  ct chest from recent admission reviewed, cxr noted, repeat CT chest reviewed - multifocal pna reported - however - clinically does not correlate -   would monitor off ABX - biomarkers - cx - monitor VS and Sat  assess - eval for HF - vol overload - Diuresis - I and O - cardio eval - TTE reviewed  ELMER - does not tolerate CPAP  CKD - monitor renal indices - i and o - replete lytes  dysphagia diet - asp prec - HOB elev - oral hygiene  assist with needs - ADL  emotional support  tele monitor  old records reviewed

## 2021-10-25 NOTE — PROGRESS NOTE ADULT - PROBLEM SELECTOR PLAN 1
- Pt presented with SOB worse with laying down and with LE edema Since Sunday  - CxR: Bilateral lung parenchymal airspace opacities, increased from prior. Small pleural effusions suggested  - B/l LE Dopplers: No evidence of deep venous thrombosis in either lower extremity.  - BNP: 01906  - TTE on 10/13/21(previous admission): LVEF 55-60%. LV diastolic function cannot determine due to atrial fibrillation. -- Do not need to repeat at time AC on Chronic Diastolic CHF   - supplemental O2 as needed   - s/p 60mg IVP-stop  - cont Lasix 60mg PO BID  - am CT chest:   1. Small bilateral pleural effusions.  2. Multifocal bilateral pneumonia, organism nonspecific, does not have the characteristic appearance of COVID-19 but is within range of what can be seen with COVID-19 pneumonia.  - Monitor fluid status closely  - Cardio, Dr. Bliss's group, following.  - Pulm following- recommends monitoring off antibiotics at this time.

## 2021-10-25 NOTE — PROGRESS NOTE ADULT - PROBLEM SELECTOR PLAN 2
- Pt presented with /96 --- unclear if pt is compliant with his medications as he does not appear to have a good understanding of his medical conditions --- Psych, Dr. Winkler, consulted for assessment of capacity- ok to discharge from psych standpoint   - No vision changes, headache, ARIELLA, current CP, ECHO done.  - C/w with home antihypertensives:  Lasix 60 mg 2x day  -  Continue clonidine 0.1 TID  - On hydralazine 100 mg q 8 hrs   - continue amlodipine 10 mg qd  - continue nifedipine XL  - continue  Imdur 30mg   - Cardio, Dr. Bliss's group, following. - Pt presented with /96 --- unclear if pt is compliant with his medications as he does not appear to have a good understanding of his medical conditions --- Psych, Dr. Winkler, consulted for assessment of capacity- ok to discharge from psych standpoint   - No vision changes, headache, ARIELLA, current CP, ECHO done.  - C/w with home antihypertensives:   - Lasix 60 mg 2x day  -  increase  clonidine 0.2 mg 2x day  - On hydralazine 100 mg q 8 hrs   - On amlodipine 10 mg qd  -Cardura 8 mg daily   - increase  Imdur 60 mg daily   -STOP  nifedipine XL  - Cardio, Dr. Bliss's group, following.

## 2021-10-25 NOTE — PROGRESS NOTE ADULT - PROBLEM SELECTOR PLAN 3
- Pt was recent admitted to [facility] from 10/7-10/16 and treated for PNA with IV Zosyn & Augmentin x 7 days   - Although CxR shows bilateral lung parenchymal airspace opacities, increased from prior pt does not have leukocytosis or fever and it is suspected SOB is more so 2/2 CHF exacerbation. CxR findings are suspected to be from previously treated PNA  - s/p vanc and zosyn in ED. will hold off on further Abx at this time.as per Pulmonary Dr Day   - F/u Cultures: pending results, Nl Lactate   - official S&S: dysphagis 3, soft solids   - Pulm, Dr. Day,- Keep Off Abx , glorialey Old PNA   Continue Symbicort 2x day.

## 2021-10-25 NOTE — PROGRESS NOTE ADULT - SUBJECTIVE AND OBJECTIVE BOX
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 Patient states his SOB started on Sunday and has been worse with laying down. Does admit to intermittent CP associated with the SOB and also when getting imaging as that makes him anxious. Does not appear to have a clear understanding of his medical conditions. Also, states he had some bleeding in his mouth on Sunday, but there is no longer bleeding; on exam there is a healing cut on his tongue and inner right cheek with no active bleeding likely from the pt biting his tongue. Admits difficulty laying down straight due to SOB. Denies fevers, chills, abd pain, n/v, sore throat, cough, palpitations  Of note, pt was recently admitted to [facility] on 10/7/21 for hypertensive urgency, Afib and  findings of PNA. was Rxed with IV Zosyn x 7 days  Abx .pt also got 1 u pRBC transfusion past week.    ED Course:   Vitals: BP: 210/96-->181/84, HR: 79, Temp: 98 F, RR: 17, SpO2: 100% on RA-->89% on RA-->98% on 2L NC   Labs: H/H: 9.6/30.6, aPTT: 43, PT/INR: 18.9/1.65, BUN/Cr: 28/2.2, alb: 3.1, GFR: 28, procal: .16, trop: .059, proBNP:10,846  Imaging:   CxR:  Bilateral lung parenchymal airspace opacities, increased from prior. Small pleural effusions suggested  B/l LE Dopplers: No evidence of deep venous thrombosis in either lower extremity.  Bilateral popliteal fossa fluid collections, likely Baker's cysts.  EKG: HR: 74, Atrial fibrillation, Incomplete right bundle branch block, Nonspecific T wave abnormality, Prolonged QT(461)  Received in the ED: Vanco and Zosyn x2, IV lasix 60mg, 10mg Hydralazine IVP x2 (18 Oct 2021 17:24)    INTERVAL HPI: Patient seen and examined at bedside. No overnight events occurred. Patient complaining of mild headache, 4/10. Denies fevers, chills, headache, chest pain, dyspnea, abdominal pain, n/v/d/c. Pt complaining of dysuria for 2 weeks.      OVERNIGHT EVENTS:    Home Medications:  amLODIPine 10 mg oral tablet: 1 tab(s) orally once a day (25 Oct 2021 14:57)  apixaban 5 mg oral tablet: 1 tab(s) orally every 12 hours (25 Oct 2021 14:57)  ARIPiprazole 30 mg oral tablet: 1 tab(s) orally once a day (25 Oct 2021 14:57)  aspirin 81 mg oral delayed release tablet: 1 tab(s) orally once a day (25 Oct 2021 14:57)  atorvastatin 10 mg oral tablet: 1 tab(s) orally once a day (at bedtime) (25 Oct 2021 14:57)  budesonide-formoterol 160 mcg-4.5 mcg/inh inhalation aerosol:  inhaled  (25 Oct 2021 14:57)  cloNIDine 0.2 mg oral tablet: 1 tab(s) orally every 12 hours (25 Oct 2021 14:57)  cyanocobalamin 1000 mcg oral tablet: 1 tab(s) orally once a day (25 Oct 2021 14:57)  doxazosin 8 mg oral tablet: 1 tab(s) orally once a day (at bedtime) (25 Oct 2021 14:57)  famotidine 20 mg oral tablet: 1 tab(s) orally once a day (25 Oct 2021 14:57)  folic acid 1 mg oral tablet: 1 tab(s) orally once a day (25 Oct 2021 14:57)  furosemide 20 mg oral tablet: 3 tab(s) orally 2 times a day (25 Oct 2021 14:57)  hydrALAZINE 100 mg oral tablet: 1 tab(s) orally 3 times a day (25 Oct 2021 14:57)  isosorbide mononitrate 60 mg oral tablet, extended release: 1 tab(s) orally once a day (25 Oct 2021 14:57)  lamoTRIgine 100 mg oral tablet: 1 tab(s) orally once a day (25 Oct 2021 14:57)  mirtazapine 30 mg oral tablet: 1 tab(s) orally once a day (at bedtime) (25 Oct 2021 14:57)  venlafaxine 150 mg oral capsule, extended release: 1 cap(s) orally once a day (25 Oct 2021 14:57)      MEDICATIONS  (STANDING):  amLODIPine   Tablet 10 milliGRAM(s) Oral daily  apixaban 5 milliGRAM(s) Oral every 12 hours  ARIPiprazole 30 milliGRAM(s) Oral daily  aspirin enteric coated 81 milliGRAM(s) Oral daily  atorvastatin 10 milliGRAM(s) Oral at bedtime  budesonide 160 MICROgram(s)/formoterol 4.5 MICROgram(s) Inhaler 2 Puff(s) Inhalation two times a day  cloNIDine 0.2 milliGRAM(s) Oral every 12 hours  cyanocobalamin 1000 MICROGram(s) Oral daily  doxazosin 8 milliGRAM(s) Oral at bedtime  famotidine    Tablet 20 milliGRAM(s) Oral daily  folic acid 1 milliGRAM(s) Oral daily  furosemide    Tablet 60 milliGRAM(s) Oral two times a day  hydrALAZINE 100 milliGRAM(s) Oral three times a day  isosorbide   mononitrate ER Tablet (IMDUR) 60 milliGRAM(s) Oral daily  lamoTRIgine 100 milliGRAM(s) Oral daily  mirtazapine 30 milliGRAM(s) Oral at bedtime  venlafaxine XR. 150 milliGRAM(s) Oral daily    MEDICATIONS  (PRN):      Allergies    No Known Allergies    Intolerances        Social History:  Tobacco: quit in 1980, not active smoker  EtOH: denies   Recreational drug use: cocaine several years ago "a few times" has not used in "many years"  Lives with: [facility] house; group home; no nursing assistance; observation is there  Ambulates: independent, denies walker or cane but uses guard railings  ADLs: independent, but states need for assistance in bathing, cooking; independent with feeding, ambulating  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[place]  Vaccinations: Moderna x2 doses last dose in May (18 Oct 2021 17:24)      REVIEW OF SYSTEMS:  CONSTITUTIONAL: No fever, No chills, No fatigue, No myalgia, No Body ache, No Weakness  EYES: No eye pain,  No visual disturbances, No discharge, NO Redness  ENMT:  No ear pain, No nose bleed, No vertigo; No sinus pain, NO throat pain, No Congestion  NECK: No pain, No stiffness  RESPIRATORY: No cough, NO wheezing, No  hemoptysis, NO  shortness of breath  CARDIOVASCULAR: No chest pain, palpitations  GASTROINTESTINAL: No abdominal pain, NO epigastric pain. No nausea, No vomiting; No diarrhea, No constipation. [  ] BM  GENITOURINARY: admits dysuria, No frequency, No urgency, No hematuria, NO incontinence  NEUROLOGICAL: No headaches, No dizziness, No numbness, No tingling, No tremors, No weakness  EXT: No Swelling, No Pain, No Edema  SKIN:  [  ] No itching, burning, rashes, or lesions   MUSCULOSKELETAL: No joint pain ,No Jt swelling; No muscle pain, No back pain, No extremity pain  PSYCHIATRIC: No depression,  No anxiety,  No mood swings ,No difficulty sleeping at night  PAIN SCALE: [  ] None  [  ] Other-  ROS Unable to obtain due to - [  ] Dementia  [  ] Lethargy [  ] Drowsy [  ] Sedated [  ] non verbal  REST OF REVIEW Of SYSTEM - [  ] Normal     Vital Signs Last 24 Hrs  T(C): 36.4 (25 Oct 2021 13:07), Max: 36.6 (24 Oct 2021 19:53)  T(F): 97.5 (25 Oct 2021 13:07), Max: 97.9 (25 Oct 2021 05:12)  HR: 66 (25 Oct 2021 13:07) (54 - 66)  BP: 137/66 (25 Oct 2021 13:07) (137/66 - 163/78)  BP(mean): --  RR: 18 (25 Oct 2021 13:07) (18 - 18)  SpO2: 95% (25 Oct 2021 13:07) (94% - 95%)  Finger Stick        10-24 @ 07:01  -  10-25 @ 07:00  --------------------------------------------------------  IN: 0 mL / OUT: 2400 mL / NET: -2400 mL        PHYSICAL EXAM:  GENERAL:  [ x ] NAD , [  ] well appearing, [  ] Agitated, [  ] Drowsy,  [  ] Lethargy, [  ] confused   HEAD:  [ x ] Normal, [  ] Other  EYES:  [ x ] EOMI, [ x ] PERRLA, [ x ] conjunctiva and sclera clear normal, [  ] Other,  [  ] Pallor,[  ] Discharge  ENMT:  [ x ] Normal, [  ] Moist mucous membranes, [  ] Good dentition, [  ] No Thrush  NECK:  [ x ] Supple, [  ] No JVD, [  ] Normal thyroid, [  ] Lymphadenopathy [  ] Other  CHEST/LUNG:  [ x ] Clear to auscultation bilaterally, [ x ] Breath Sounds equal B/L / Decrease, [  ] poor effort  [  ] No rales, [  ] No rhonchi  [  ]  No wheezing,   HEART:  [ x ] Regular rate and rhythm, [  ] tachycardia, [  ] Bradycardia,  [  ] irregular  [  ] No murmurs, No rubs, No gallops, [  ] PPM in place (Mfr:  )  ABDOMEN:  [ x ] Soft, [ x ] mildly tender in RLQ, [  ] Nondistended, [  ] No mass, [  ] Bowel sounds present, [ x ] obese  NERVOUS SYSTEM:  [ x ] Alert & Oriented X3, [ x ] Nonfocal  [  ] Confusion  [  ] Encephalopathic [  ] Sedated [  ] Unable to assess, [  ] Dementia [  ] Other-  EXTREMITIES: [  ] 2+ Peripheral Pulses, No clubbing, No cyanosis,  [ x ] mild edema B/L lower EXT. [  ] PVD stasis skin changes B/L Lower EXT, [  ] wound  LYMPH: No lymphadenopathy noted  SKIN:  [ x ] No rashes or lesions, [  ] Pressure Ulcers, [  ] ecchymosis, [  ] Skin Tears, [  ] Other    DIET: Diet, Dysphagia 3 Soft-Thin Liquids:   Low Sodium (10-19-21 @ 09:33)      LABS:                        9.4    7.57  )-----------( 278      ( 25 Oct 2021 07:42 )             31.3     25 Oct 2021 07:42    142    |  108    |  41     ----------------------------<  112    3.5     |  26     |  2.30     Ca    9.7        25 Oct 2021 07:42    TPro  7.3    /  Alb  2.9    /  TBili  0.3    /  DBili  x      /  AST  17     /  ALT  22     /  AlkPhos  50     25 Oct 2021 07:42          Culture Results:   <10,000 CFU/mL Normal Urogenital Johnna (10-18 @ 18:35)          CARDIAC MARKERS ( 23 Oct 2021 14:13 )  .022 ng/mL / x     / 40 U/L / x     / 1.4 ng/mL  CARDIAC MARKERS ( 23 Oct 2021 09:42 )  .034 ng/mL / x     / 44 U/L / x     / 1.6 ng/mL  CARDIAC MARKERS ( 19 Oct 2021 02:22 )  .084 ng/mL / x     / x     / x     / x      CARDIAC MARKERS ( 18 Oct 2021 20:09 )  .086 ng/mL / x     / 90 U/L / x     / 1.7 ng/mL          Culture - Urine (collected 18 Oct 2021 18:35)  Source: Clean Catch Clean Catch (Midstream)  Final Report (19 Oct 2021 14:17):    <10,000 CFU/mL Normal Urogenital Johnna         Anemia Panel:      Thyroid Panel:                RADIOLOGY & ADDITIONAL TESTS:      HEALTH ISSUES - PROBLEM Dx:  Acute exacerbation of CHF (congestive heart failure)    Hypertensive urgency    HLD (hyperlipidemia)    CAD (coronary artery disease)  s/p stents (not on plavix)    Type 2 diabetes mellitus  not on home insulin    Atrial fibrillation  first documented on EKG 10/7/2021    Depression with anxiety    Pneumonia    DVT prophylaxis    Stage 3 chronic kidney disease    Anemia    Elevated troponin            Consultant(s) Notes Reviewed:  [  ] YES     Care Discussed with [X] Consultants  [  ] Patient  [  ] Family [  ] HCP [  ]   [  ] Social Service  [  ] RN, [  ] Physical Therapy,[  ] Palliative care team  DVT PPX: [  ] Lovenox, [  ] S C Heparin, [  ] Coumadin, [  ] Xarelto, [  ] Eliquis, [  ] Pradaxa, [  ] IV Heparin drip, [  ] SCD [  ] Contraindication 2 to GI Bleed,[  ] Ambulation [  ] Contraindicated 2 to  bleed [  ] Contraindicated 2 to Brain Bleed  Advanced directive: [  ] None, [  ] DNR/DNI Patient is a 76y old  Male who presents with a chief complaint of Hypertensive urgency  (21 Oct 2021 13:26)    HPI:  75 yo M with PMHx of Type 2 DM (not on insulin), BPH, CAD s/p stents >4 years ago (not on plavix), diverticulitis (hospitalized 07/2020 at [facility]), GIB 2/2 diverticulosis (2020), anxiety, Lupus, HTN, HLD, CKD stage 3, HepC, 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 Patient states his SOB started on Sunday and has been worse with laying down. Does admit to intermittent CP associated with the SOB and also when getting imaging as that makes him anxious. Does not appear to have a clear understanding of his medical conditions. Also, states he had some bleeding in his mouth on Sunday, but there is no longer bleeding; on exam there is a healing cut on his tongue and inner right cheek with no active bleeding likely from the pt biting his tongue. Admits difficulty laying down straight due to SOB. Denies fevers, chills, abd pain, n/v, sore throat, cough, palpitations  Of note, pt was recently admitted to [facility] on 10/7/21 for hypertensive urgency, Afib and  findings of PNA. was Rxed with IV Zosyn x 7 days  Abx .pt also got 1 u pRBC transfusion past week.    ED Course:   Vitals: BP: 210/96-->181/84, HR: 79, Temp: 98 F, RR: 17, SpO2: 100% on RA-->89% on RA-->98% on 2L NC   Labs: H/H: 9.6/30.6, aPTT: 43, PT/INR: 18.9/1.65, BUN/Cr: 28/2.2, alb: 3.1, GFR: 28, procal: .16, trop: .059, proBNP:10,846  Imaging:   CxR:  Bilateral lung parenchymal airspace opacities, increased from prior. Small pleural effusions suggested  B/l LE Dopplers: No evidence of deep venous thrombosis in either lower extremity.  Bilateral popliteal fossa fluid collections, likely Baker's cysts.  EKG: HR: 74, Atrial fibrillation, Incomplete right bundle branch block, Nonspecific T wave abnormality, Prolonged QT(461)  Received in the ED: Vanco and Zosyn x2, IV lasix 60mg, 10mg Hydralazine IVP x2 (18 Oct 2021 17:24)    INTERVAL HPI: Patient seen and examined at bedside. No overnight events occurred. Patient complaining of mild headache, 4/10. Denies fevers, chills, headache, chest pain, dyspnea, abdominal pain, n/v/d/c. Pt complaining of dysuria for 2 weeks.      OVERNIGHT EVENTS:  10/19/21: Patient was seen and examined at bedside. Denies any complaints. States his shortness of breath has improved.   10/20/21: Patient was seen and examined at bedside. States he was not able to sleep well because of another patient screaming across the room. Otherwise denies worsening SOB, chest pain, palpitations.  10/21/21: Patient was seen and examined at bedside. Patient comfortably having breakfast in bed, alert and awake. States he has no further complaints. Stable Labs Low H/H . Cr stable.  10/22/21: No acute events overnight. Patient seen and examined at bedside. Patient denies any fever, chills, chest pain, dyspnea, abdominal pain, leg pain/swelling, dysuria, constipation, diarhea. Needs TB screening. PPD to be placed.  COVID test prior to discharge stabel labs   10/23/21: Patient was seen and examined at bedside. No acute events overnight. Patient complaining of atypical chest pain and palpitations. STAT ekg ordered- unchanged from prior. Ordered cardiac enzymes- will follow up. Ordered Cardizem 2.5mg x 1. No ACS  10/24/21: Patient was seen and examined at bedside. Denied of any acute complaints. States he had SOB overnight and was put on NC but respiratory status improved and patient was put on RA shortly after.   10/25/21: Pt seen, Examined, NO Complaints, pt is stable for d/c , DR Winkler follow up, NEG PPD -0MM induration, COVID PCR -NEG     Home Medications:  amLODIPine 10 mg oral tablet: 1 tab(s) orally once a day (25 Oct 2021 14:57)  apixaban 5 mg oral tablet: 1 tab(s) orally every 12 hours (25 Oct 2021 14:57)  ARIPiprazole 30 mg oral tablet: 1 tab(s) orally once a day (25 Oct 2021 14:57)  aspirin 81 mg oral delayed release tablet: 1 tab(s) orally once a day (25 Oct 2021 14:57)  atorvastatin 10 mg oral tablet: 1 tab(s) orally once a day (at bedtime) (25 Oct 2021 14:57)  budesonide-formoterol 160 mcg-4.5 mcg/inh inhalation aerosol:  inhaled  (25 Oct 2021 14:57)  cloNIDine 0.2 mg oral tablet: 1 tab(s) orally every 12 hours (25 Oct 2021 14:57)  cyanocobalamin 1000 mcg oral tablet: 1 tab(s) orally once a day (25 Oct 2021 14:57)  doxazosin 8 mg oral tablet: 1 tab(s) orally once a day (at bedtime) (25 Oct 2021 14:57)  famotidine 20 mg oral tablet: 1 tab(s) orally once a day (25 Oct 2021 14:57)  folic acid 1 mg oral tablet: 1 tab(s) orally once a day (25 Oct 2021 14:57)  furosemide 20 mg oral tablet: 3 tab(s) orally 2 times a day (25 Oct 2021 14:57)  hydrALAZINE 100 mg oral tablet: 1 tab(s) orally 3 times a day (25 Oct 2021 14:57)  isosorbide mononitrate 60 mg oral tablet, extended release: 1 tab(s) orally once a day (25 Oct 2021 14:57)  lamoTRIgine 100 mg oral tablet: 1 tab(s) orally once a day (25 Oct 2021 14:57)  mirtazapine 30 mg oral tablet: 1 tab(s) orally once a day (at bedtime) (25 Oct 2021 14:57)  venlafaxine 150 mg oral capsule, extended release: 1 cap(s) orally once a day (25 Oct 2021 14:57)      MEDICATIONS  (STANDING):  amLODIPine   Tablet 10 milliGRAM(s) Oral daily  apixaban 5 milliGRAM(s) Oral every 12 hours  ARIPiprazole 30 milliGRAM(s) Oral daily  aspirin enteric coated 81 milliGRAM(s) Oral daily  atorvastatin 10 milliGRAM(s) Oral at bedtime  budesonide 160 MICROgram(s)/formoterol 4.5 MICROgram(s) Inhaler 2 Puff(s) Inhalation two times a day  cloNIDine 0.2 milliGRAM(s) Oral every 12 hours  cyanocobalamin 1000 MICROGram(s) Oral daily  doxazosin 8 milliGRAM(s) Oral at bedtime  famotidine    Tablet 20 milliGRAM(s) Oral daily  folic acid 1 milliGRAM(s) Oral daily  furosemide    Tablet 60 milliGRAM(s) Oral two times a day  hydrALAZINE 100 milliGRAM(s) Oral three times a day  isosorbide   mononitrate ER Tablet (IMDUR) 60 milliGRAM(s) Oral daily  lamoTRIgine 100 milliGRAM(s) Oral daily  mirtazapine 30 milliGRAM(s) Oral at bedtime  venlafaxine XR. 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 No nausea, No vomiting; No diarrhea, No constipation. [  ] BM  GENITOURINARY: admits dysuria, No frequency, No urgency, No hematuria, NO incontinence  NEUROLOGICAL: No headaches, No dizziness, No numbness, No tingling, No tremors, No weakness  EXT: No Swelling, No Pain, No Edema  SKIN:  [ x ] No itching, burning, rashes, or lesions   MUSCULOSKELETAL: No joint pain ,No Jt swelling; No muscle pain, No back pain, No extremity pain  PSYCHIATRIC: No depression,  No anxiety,  No mood swings ,No difficulty sleeping at night  PAIN SCALE: [ x ] None  [  ] Other-  ROS Unable to obtain due to - [  ] Dementia  [  ] Lethargy [  ] Drowsy [  ] Sedated [  ] non verbal  REST OF REVIEW Of SYSTEM - [ x ] Normal     Vital Signs Last 24 Hrs  T(C): 36.4 (25 Oct 2021 13:07), Max: 36.6 (24 Oct 2021 19:53)  T(F): 97.5 (25 Oct 2021 13:07), Max: 97.9 (25 Oct 2021 05:12)  HR: 66 (25 Oct 2021 13:07) (54 - 66)  BP: 137/66 (25 Oct 2021 13:07) (137/66 - 163/78)  BP(mean): --  RR: 18 (25 Oct 2021 13:07) (18 - 18)  SpO2: 95% (25 Oct 2021 13:07) (94% - 95%)  Finger Stick        10-24 @ 07:01  -  10-25 @ 07:00  --------------------------------------------------------  IN: 0 mL / OUT: 2400 mL / NET: -2400 mL        PHYSICAL EXAM:  GENERAL:  [ x ] NAD , [ x ] well appearing, [  ] Agitated, [  ] Drowsy,  [  ] Lethargy, [  ] confused   HEAD:  [ x ] Normal, [  ] Other  EYES:  [ x ] EOMI, [ x ] PERRLA, [ x ] conjunctiva and sclera clear normal, [  ] Other,  [  ] Pallor,[  ] Discharge  ENMT:  [ x ] Normal, [ x ] Moist mucous membranes, [ x ] Good dentition, [ x ] No Thrush  NECK:  [ x ] Supple, [  x] No JVD, [  ] Normal thyroid, [  ] Lymphadenopathy [  ] Other  CHEST/LUNG:  [ x ] Clear to auscultation bilaterally, [ x ] Breath Sounds equal B/L / Decrease, [ x ] poor effort  [ x ] No rales, [ x ] No rhonchi  [ x ]  No wheezing,   HEART:  [ x ] Regular rate and rhythm, [  ] tachycardia, [  ] Bradycardia,  [  ] irregular  [x  ] No murmurs, No rubs, No gallops, [  ] PPM in place (Mfr:  )  ABDOMEN:  [ x ] Soft, [ x ] mildly tender in RLQ, [ x ] Nondistended, [ x ] No mass, [ x ] Bowel sounds present, [ x ] obese  NERVOUS SYSTEM:  [ x ] Alert & Oriented X3, [ x ] Nonfocal  [  ] Confusion  [  ] Encephalopathic [  ] Sedated [  ] Unable to assess, [  ] Dementia [  ] Other-  EXTREMITIES: [  ] 2+ Peripheral Pulses, No clubbing, No cyanosis,  [ x ] mild edema B/L lower EXT. [  ] PVD stasis skin changes B/L Lower EXT, [  ] wound  LYMPH: No lymphadenopathy noted  SKIN:  [ x ] No rashes or lesions, [  ] Pressure Ulcers, [  ] ecchymosis, [  ] Skin Tears, [ x ] Other-Tattoos on EXT    DIET: Diet, Dysphagia 3 Soft-Thin Liquids:   Low Sodium (10-19-21 @ 09:33)      LABS:                        9.4    7.57  )-----------( 278      ( 25 Oct 2021 07:42 )             31.3     25 Oct 2021 07:42    142    |  108    |  41     ----------------------------<  112    3.5     |  26     |  2.30     Ca    9.7        25 Oct 2021 07:42    TPro  7.3    /  Alb  2.9    /  TBili  0.3    /  DBili  x      /  AST  17     /  ALT  22     /  AlkPhos  50     25 Oct 2021 07:42          Culture Results:   <10,000 CFU/mL Normal Urogenital Johnna (10-18 @ 18:35)          CARDIAC MARKERS ( 23 Oct 2021 14:13 )  .022 ng/mL / x     / 40 U/L / x     / 1.4 ng/mL  CARDIAC MARKERS ( 23 Oct 2021 09:42 )  .034 ng/mL / x     / 44 U/L / x     / 1.6 ng/mL  CARDIAC MARKERS ( 19 Oct 2021 02:22 )  .084 ng/mL / x     / x     / x     / x      CARDIAC MARKERS ( 18 Oct 2021 20:09 )  .086 ng/mL / x     / 90 U/L / x     / 1.7 ng/mL          Culture - Urine (collected 18 Oct 2021 18:35)  Source: Clean Catch Clean Catch (Midstream)  Final Report (19 Oct 2021 14:17):    <10,000 CFU/mL Normal Urogenital Johnna      RADIOLOGY & ADDITIONAL TESTS:NONE      HEALTH ISSUES - PROBLEM Dx:  Acute exacerbation of CHF (congestive heart failure)    Hypertensive urgency    HLD (hyperlipidemia)    CAD (coronary artery disease)  s/p stents (not on plavix)    Type 2 diabetes mellitus  not on home insulin    Atrial fibrillation  first documented on EKG 10/7/2021    Depression with anxiety    Pneumonia    DVT prophylaxis    Stage 3 chronic kidney disease    Anemia    Elevated troponin            Consultant(s) Notes Reviewed:  [ x ] YES     Care Discussed with [X] Consultants  [ x ] Patient  [  ] Family [  ] HCP [  ]   [ x ] Social Service  [ x ] RN, [  ] Physical Therapy,[  ] Palliative care team  DVT PPX: [  ] Lovenox, [  ] S C Heparin, [  ] Coumadin, [  ] Xarelto, [ x ] Eliquis, [  ] Pradaxa, [  ] IV Heparin drip, [  ] SCD [  ] Contraindication 2 to GI Bleed,[  ] Ambulation [  ] Contraindicated 2 to  bleed [  ] Contraindicated 2 to Brain Bleed  Advanced directive: [x  ] None, [  ] DNR/DNI

## 2021-10-25 NOTE — PROGRESS NOTE ADULT - ASSESSMENT
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

## 2021-10-25 NOTE — BH CONSULTATION LIAISON PROGRESS NOTE - NSBHCHARTREVIEWVS_PSY_A_CORE FT
Vital Signs Last 24 Hrs  T(C): 36.4 (25 Oct 2021 13:07), Max: 36.6 (24 Oct 2021 19:53)  T(F): 97.5 (25 Oct 2021 13:07), Max: 97.9 (25 Oct 2021 05:12)  HR: 66 (25 Oct 2021 13:07) (54 - 66)  BP: 137/66 (25 Oct 2021 13:07) (137/66 - 163/78)  BP(mean): --  RR: 18 (25 Oct 2021 13:07) (18 - 18)  SpO2: 95% (25 Oct 2021 13:07) (94% - 95%)

## 2021-10-25 NOTE — PROGRESS NOTE ADULT - SUBJECTIVE AND OBJECTIVE BOX
Date/Time Patient Seen:  		  Referring MD:   Data Reviewed	       Patient is a 76y old  Male who presents with a chief complaint of Hypertensive urgency  (21 Oct 2021 13:26)      Subjective/HPI     PAST MEDICAL & SURGICAL HISTORY:  Hypertension    Diabetes mellitus    Lupus    Hepatitis C    Anxiety and depression    CAD (coronary artery disease)  s/p stents    Diverticulosis    Hyperlipidemia    HTN (hypertension)  c/b multiple episodes of hypertensive urgency    HLD (hyperlipidemia)    Atrial fibrillation  first documented on EKG 10/7/2021    CAD (coronary artery disease)  s/p stents (not on plavix)    Type 2 diabetes mellitus  not on home insulin/ Meds    Anxiety  Depression  multiple psych medications    History of diverticulitis  07/2021    Diverticulosis  c/b GIB in 2020    Afib  on AC    Stage 3 chronic kidney disease    Anemia of chronic disease  Monoclonal Gammopathy-MGUS  pRBC transfusion 10/15/21    Chronic diastolic congestive heart failure    Multiple thyroid nodules    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  Had surgery ( April 2013 )    S/P tonsillectomy    S/P arthroscopic knee surgery  Bilateral ( 2005 )    Torsion of testicle  Had surgery at age 14    Pilonidal cyst  Had surgery ( 1969 )    S/P cataract surgery  Bilateral    H/O hernia repair          Medication list         MEDICATIONS  (STANDING):  amLODIPine   Tablet 10 milliGRAM(s) Oral daily  apixaban 5 milliGRAM(s) Oral every 12 hours  ARIPiprazole 30 milliGRAM(s) Oral daily  aspirin enteric coated 81 milliGRAM(s) Oral daily  atorvastatin 10 milliGRAM(s) Oral at bedtime  budesonide 160 MICROgram(s)/formoterol 4.5 MICROgram(s) Inhaler 2 Puff(s) Inhalation two times a day  cloNIDine 0.2 milliGRAM(s) Oral every 12 hours  cyanocobalamin 1000 MICROGram(s) Oral daily  doxazosin 8 milliGRAM(s) Oral at bedtime  famotidine    Tablet 20 milliGRAM(s) Oral daily  folic acid 1 milliGRAM(s) Oral daily  furosemide    Tablet 60 milliGRAM(s) Oral two times a day  hydrALAZINE 100 milliGRAM(s) Oral three times a day  isosorbide   mononitrate ER Tablet (IMDUR) 60 milliGRAM(s) Oral daily  lamoTRIgine 100 milliGRAM(s) Oral daily  mirtazapine 30 milliGRAM(s) Oral at bedtime  venlafaxine XR. 150 milliGRAM(s) Oral daily    MEDICATIONS  (PRN):         Vitals log        ICU Vital Signs Last 24 Hrs  T(C): 36.6 (25 Oct 2021 05:12), Max: 36.8 (24 Oct 2021 11:40)  T(F): 97.9 (25 Oct 2021 05:12), Max: 98.3 (24 Oct 2021 11:40)  HR: 62 (25 Oct 2021 05:12) (54 - 74)  BP: 156/58 (25 Oct 2021 05:12) (156/58 - 171/82)  BP(mean): --  ABP: --  ABP(mean): --  RR: 18 (25 Oct 2021 05:12) (18 - 18)  SpO2: 95% (25 Oct 2021 05:12) (94% - 95%)           Input and Output:  I&O's Detail    23 Oct 2021 07:01  -  24 Oct 2021 07:00  --------------------------------------------------------  IN:  Total IN: 0 mL    OUT:    Voided (mL): 500 mL  Total OUT: 500 mL    Total NET: -500 mL      24 Oct 2021 07:01  -  25 Oct 2021 06:01  --------------------------------------------------------  IN:  Total IN: 0 mL    OUT:    Voided (mL): 1675 mL  Total OUT: 1675 mL    Total NET: -1675 mL          Lab Data                        8.8    7.20  )-----------( 257      ( 24 Oct 2021 10:04 )             29.6     10-24    143  |  109<H>  |  41<H>  ----------------------------<  221<H>  3.5   |  23  |  2.70<H>    Ca    8.9      24 Oct 2021 10:04    TPro  7.2  /  Alb  2.7<L>  /  TBili  0.2  /  DBili  x   /  AST  11<L>  /  ALT  23  /  AlkPhos  50  10-24      CARDIAC MARKERS ( 23 Oct 2021 14:13 )  .022 ng/mL / x     / 40 U/L / x     / 1.4 ng/mL  CARDIAC MARKERS ( 23 Oct 2021 09:42 )  .034 ng/mL / x     / 44 U/L / x     / 1.6 ng/mL        Review of Systems	      Objective     Physical Examination    heart s1s2  lung dec BS  abd soft      Pertinent Lab findings & Imaging      Nikko:  NO   Adequate UO     I&O's Detail    23 Oct 2021 07:01  -  24 Oct 2021 07:00  --------------------------------------------------------  IN:  Total IN: 0 mL    OUT:    Voided (mL): 500 mL  Total OUT: 500 mL    Total NET: -500 mL      24 Oct 2021 07:01  -  25 Oct 2021 06:01  --------------------------------------------------------  IN:  Total IN: 0 mL    OUT:    Voided (mL): 1675 mL  Total OUT: 1675 mL    Total NET: -1675 mL               Discussed with:     Cultures:	        Radiology

## 2021-10-25 NOTE — PROGRESS NOTE ADULT - SUBJECTIVE AND OBJECTIVE BOX
Patient is a 76y old  Male who presents with a chief complaint of shortness of breath (19 Oct 2021 09:10)  Patient seen in follow up for CKD 4, fluid overload.        PAST MEDICAL HISTORY:  Hypertension    Diabetes mellitus    Lupus    Hepatitis C    Anxiety and depression    CAD (coronary artery disease)    Diverticulosis    Hyperlipidemia    HTN (hypertension)    HLD (hyperlipidemia)    Atrial fibrillation    CAD (coronary artery disease)    Type 2 diabetes mellitus    Anxiety    History of diverticulitis    Diverticulosis    Afib    Stage 3 chronic kidney disease    Anemia of chronic disease    Chronic diastolic congestive heart failure    Multiple thyroid nodules      MEDICATIONS  (STANDING):  amLODIPine   Tablet 10 milliGRAM(s) Oral daily  apixaban 5 milliGRAM(s) Oral every 12 hours  ARIPiprazole 30 milliGRAM(s) Oral daily  aspirin enteric coated 81 milliGRAM(s) Oral daily  atorvastatin 10 milliGRAM(s) Oral at bedtime  budesonide 160 MICROgram(s)/formoterol 4.5 MICROgram(s) Inhaler 2 Puff(s) Inhalation two times a day  cloNIDine 0.2 milliGRAM(s) Oral every 12 hours  cyanocobalamin 1000 MICROGram(s) Oral daily  doxazosin 8 milliGRAM(s) Oral at bedtime  famotidine    Tablet 20 milliGRAM(s) Oral daily  folic acid 1 milliGRAM(s) Oral daily  furosemide    Tablet 60 milliGRAM(s) Oral two times a day  hydrALAZINE 100 milliGRAM(s) Oral three times a day  isosorbide   mononitrate ER Tablet (IMDUR) 60 milliGRAM(s) Oral daily  lamoTRIgine 100 milliGRAM(s) Oral daily  mirtazapine 30 milliGRAM(s) Oral at bedtime  venlafaxine XR. 150 milliGRAM(s) Oral daily    MEDICATIONS  (PRN):    T(C): 36.6 (10-25-21 @ 05:12), Max: 37.1 (10-24-21 @ 04:51)  HR: 62 (10-25-21 @ 05:12) (54 - 74)  BP: 156/58 (10-25-21 @ 05:12) (141/75 - 186/87)  RR: 18 (10-25-21 @ 05:12)  SpO2: 95% (10-25-21 @ 05:12)  Wt(kg): --  I&O's Detail    24 Oct 2021 07:01  -  25 Oct 2021 07:00  --------------------------------------------------------  IN:  Total IN: 0 mL    OUT:    Voided (mL): 2400 mL  Total OUT: 2400 mL    Total NET: -2400 mL            PHYSICAL EXAM:  General: no distress  Respiratory: b/l air entry  Cardiovascular: S1 S2  Gastrointestinal: soft  Extremities: + edema           LABORATORY:                        9.4    7.57  )-----------( 278      ( 25 Oct 2021 07:42 )             31.3     10-25    142  |  108  |  41<H>  ----------------------------<  112<H>  3.5   |  26  |  2.30<H>    Ca    9.7      25 Oct 2021 07:42    TPro  7.3  /  Alb  2.9<L>  /  TBili  0.3  /  DBili  x   /  AST  17  /  ALT  22  /  AlkPhos  50  10-25    Sodium, Serum: 142 mmol/L (10-25 @ 07:42)  Sodium, Serum: 143 mmol/L (10-24 @ 10:04)    Potassium, Serum: 3.5 mmol/L (10-25 @ 07:42)  Potassium, Serum: 3.5 mmol/L (10-24 @ 10:04)    Hemoglobin: 9.4 g/dL (10-25 @ 07:42)  Hemoglobin: 8.8 g/dL (10-24 @ 10:04)  Hemoglobin: 8.5 g/dL (10-23 @ 07:09)    Creatinine, Serum 2.30 (10-25 @ 07:42)  Creatinine, Serum 2.70 (10-24 @ 10:04)  Creatinine, Serum 2.40 (10-23 @ 07:09)    CARDIAC MARKERS ( 23 Oct 2021 14:13 )  .022 ng/mL / x     / 40 U/L / x     / 1.4 ng/mL      LIVER FUNCTIONS - ( 25 Oct 2021 07:42 )  Alb: 2.9 g/dL / Pro: 7.3 g/dL / ALK PHOS: 50 U/L / ALT: 22 U/L / AST: 17 U/L / GGT: x

## 2021-10-25 NOTE — BH CONSULTATION LIAISON PROGRESS NOTE - ADDITIONAL DETAILS / COMMENTS
Patient is 1) cognitively grossly intact 2) Does not understand the nature of the medical condition, risks, benefits, alternatives and side-effects of treatment 3) Wants to make decisions voluntarily.  At this time patient has no decision making capacity regarding medical decisions.

## 2021-10-25 NOTE — BH CONSULTATION LIAISON PROGRESS NOTE - CURRENT MEDICATION
MEDICATIONS  (STANDING):  amLODIPine   Tablet 10 milliGRAM(s) Oral daily  apixaban 5 milliGRAM(s) Oral every 12 hours  ARIPiprazole 30 milliGRAM(s) Oral daily  aspirin enteric coated 81 milliGRAM(s) Oral daily  atorvastatin 10 milliGRAM(s) Oral at bedtime  budesonide 160 MICROgram(s)/formoterol 4.5 MICROgram(s) Inhaler 2 Puff(s) Inhalation two times a day  cloNIDine 0.2 milliGRAM(s) Oral every 12 hours  cyanocobalamin 1000 MICROGram(s) Oral daily  doxazosin 8 milliGRAM(s) Oral at bedtime  famotidine    Tablet 20 milliGRAM(s) Oral daily  folic acid 1 milliGRAM(s) Oral daily  furosemide    Tablet 60 milliGRAM(s) Oral two times a day  hydrALAZINE 100 milliGRAM(s) Oral three times a day  isosorbide   mononitrate ER Tablet (IMDUR) 60 milliGRAM(s) Oral daily  lamoTRIgine 100 milliGRAM(s) Oral daily  mirtazapine 30 milliGRAM(s) Oral at bedtime  venlafaxine XR. 150 milliGRAM(s) Oral daily    MEDICATIONS  (PRN):

## 2021-10-26 LAB
ALBUMIN SERPL ELPH-MCNC: 2.8 G/DL — LOW (ref 3.3–5)
ALP SERPL-CCNC: 49 U/L — SIGNIFICANT CHANGE UP (ref 40–120)
ALT FLD-CCNC: 22 U/L — SIGNIFICANT CHANGE UP (ref 12–78)
ANION GAP SERPL CALC-SCNC: 8 MMOL/L — SIGNIFICANT CHANGE UP (ref 5–17)
APPEARANCE UR: CLEAR — SIGNIFICANT CHANGE UP
AST SERPL-CCNC: 15 U/L — SIGNIFICANT CHANGE UP (ref 15–37)
BASOPHILS # BLD AUTO: 0.04 K/UL — SIGNIFICANT CHANGE UP (ref 0–0.2)
BASOPHILS NFR BLD AUTO: 0.6 % — SIGNIFICANT CHANGE UP (ref 0–2)
BILIRUB SERPL-MCNC: 0.3 MG/DL — SIGNIFICANT CHANGE UP (ref 0.2–1.2)
BILIRUB UR-MCNC: NEGATIVE — SIGNIFICANT CHANGE UP
BUN SERPL-MCNC: 44 MG/DL — HIGH (ref 7–23)
CALCIUM SERPL-MCNC: 9 MG/DL — SIGNIFICANT CHANGE UP (ref 8.5–10.1)
CHLORIDE SERPL-SCNC: 108 MMOL/L — SIGNIFICANT CHANGE UP (ref 96–108)
CO2 SERPL-SCNC: 26 MMOL/L — SIGNIFICANT CHANGE UP (ref 22–31)
COLOR SPEC: SIGNIFICANT CHANGE UP
CREAT SERPL-MCNC: 2.3 MG/DL — HIGH (ref 0.5–1.3)
DIFF PNL FLD: NEGATIVE — SIGNIFICANT CHANGE UP
EOSINOPHIL # BLD AUTO: 0.21 K/UL — SIGNIFICANT CHANGE UP (ref 0–0.5)
EOSINOPHIL NFR BLD AUTO: 3.4 % — SIGNIFICANT CHANGE UP (ref 0–6)
GLUCOSE SERPL-MCNC: 108 MG/DL — HIGH (ref 70–99)
GLUCOSE UR QL: NEGATIVE — SIGNIFICANT CHANGE UP
HCT VFR BLD CALC: 26.9 % — LOW (ref 39–50)
HGB BLD-MCNC: 8.3 G/DL — LOW (ref 13–17)
IMM GRANULOCYTES NFR BLD AUTO: 0.3 % — SIGNIFICANT CHANGE UP (ref 0–1.5)
KETONES UR-MCNC: NEGATIVE — SIGNIFICANT CHANGE UP
LEUKOCYTE ESTERASE UR-ACNC: NEGATIVE — SIGNIFICANT CHANGE UP
LYMPHOCYTES # BLD AUTO: 1.18 K/UL — SIGNIFICANT CHANGE UP (ref 1–3.3)
LYMPHOCYTES # BLD AUTO: 19.1 % — SIGNIFICANT CHANGE UP (ref 13–44)
MCHC RBC-ENTMCNC: 26 PG — LOW (ref 27–34)
MCHC RBC-ENTMCNC: 30.9 GM/DL — LOW (ref 32–36)
MCV RBC AUTO: 84.3 FL — SIGNIFICANT CHANGE UP (ref 80–100)
MONOCYTES # BLD AUTO: 0.43 K/UL — SIGNIFICANT CHANGE UP (ref 0–0.9)
MONOCYTES NFR BLD AUTO: 7 % — SIGNIFICANT CHANGE UP (ref 2–14)
NEUTROPHILS # BLD AUTO: 4.3 K/UL — SIGNIFICANT CHANGE UP (ref 1.8–7.4)
NEUTROPHILS NFR BLD AUTO: 69.6 % — SIGNIFICANT CHANGE UP (ref 43–77)
NITRITE UR-MCNC: NEGATIVE — SIGNIFICANT CHANGE UP
NRBC # BLD: 0 /100 WBCS — SIGNIFICANT CHANGE UP (ref 0–0)
PH UR: 6 — SIGNIFICANT CHANGE UP (ref 5–8)
PLATELET # BLD AUTO: 263 K/UL — SIGNIFICANT CHANGE UP (ref 150–400)
POTASSIUM SERPL-MCNC: 3.3 MMOL/L — LOW (ref 3.5–5.3)
POTASSIUM SERPL-SCNC: 3.3 MMOL/L — LOW (ref 3.5–5.3)
PROT SERPL-MCNC: 6.7 G/DL — SIGNIFICANT CHANGE UP (ref 6–8.3)
PROT UR-MCNC: 100
RBC # BLD: 3.19 M/UL — LOW (ref 4.2–5.8)
RBC # FLD: 16.9 % — HIGH (ref 10.3–14.5)
SODIUM SERPL-SCNC: 142 MMOL/L — SIGNIFICANT CHANGE UP (ref 135–145)
SP GR SPEC: 1.01 — SIGNIFICANT CHANGE UP (ref 1.01–1.02)
UROBILINOGEN FLD QL: NEGATIVE — SIGNIFICANT CHANGE UP
WBC # BLD: 6.18 K/UL — SIGNIFICANT CHANGE UP (ref 3.8–10.5)
WBC # FLD AUTO: 6.18 K/UL — SIGNIFICANT CHANGE UP (ref 3.8–10.5)

## 2021-10-26 PROCEDURE — 80061 LIPID PANEL: CPT

## 2021-10-26 PROCEDURE — 86901 BLOOD TYPING SEROLOGIC RH(D): CPT

## 2021-10-26 PROCEDURE — 99232 SBSQ HOSP IP/OBS MODERATE 35: CPT

## 2021-10-26 PROCEDURE — 84484 ASSAY OF TROPONIN QUANT: CPT

## 2021-10-26 PROCEDURE — 86900 BLOOD TYPING SEROLOGIC ABO: CPT

## 2021-10-26 PROCEDURE — 86225 DNA ANTIBODY NATIVE: CPT

## 2021-10-26 PROCEDURE — 86334 IMMUNOFIX E-PHORESIS SERUM: CPT

## 2021-10-26 PROCEDURE — 87635 SARS-COV-2 COVID-19 AMP PRB: CPT

## 2021-10-26 PROCEDURE — 82746 ASSAY OF FOLIC ACID SERUM: CPT

## 2021-10-26 PROCEDURE — 85730 THROMBOPLASTIN TIME PARTIAL: CPT

## 2021-10-26 PROCEDURE — 87040 BLOOD CULTURE FOR BACTERIA: CPT

## 2021-10-26 PROCEDURE — 84165 PROTEIN E-PHORESIS SERUM: CPT

## 2021-10-26 PROCEDURE — 83540 ASSAY OF IRON: CPT

## 2021-10-26 PROCEDURE — 71045 X-RAY EXAM CHEST 1 VIEW: CPT

## 2021-10-26 PROCEDURE — 82784 ASSAY IGA/IGD/IGG/IGM EACH: CPT

## 2021-10-26 PROCEDURE — 93306 TTE W/DOPPLER COMPLETE: CPT

## 2021-10-26 PROCEDURE — 70450 CT HEAD/BRAIN W/O DYE: CPT | Mod: MA

## 2021-10-26 PROCEDURE — 83605 ASSAY OF LACTIC ACID: CPT

## 2021-10-26 PROCEDURE — 93005 ELECTROCARDIOGRAM TRACING: CPT

## 2021-10-26 PROCEDURE — 83735 ASSAY OF MAGNESIUM: CPT

## 2021-10-26 PROCEDURE — 97162 PT EVAL MOD COMPLEX 30 MIN: CPT

## 2021-10-26 PROCEDURE — 94640 AIRWAY INHALATION TREATMENT: CPT

## 2021-10-26 PROCEDURE — 87086 URINE CULTURE/COLONY COUNT: CPT

## 2021-10-26 PROCEDURE — 85610 PROTHROMBIN TIME: CPT

## 2021-10-26 PROCEDURE — 74176 CT ABD & PELVIS W/O CONTRAST: CPT | Mod: MA

## 2021-10-26 PROCEDURE — 83550 IRON BINDING TEST: CPT

## 2021-10-26 PROCEDURE — 85652 RBC SED RATE AUTOMATED: CPT

## 2021-10-26 PROCEDURE — 86923 COMPATIBILITY TEST ELECTRIC: CPT

## 2021-10-26 PROCEDURE — 86160 COMPLEMENT ANTIGEN: CPT

## 2021-10-26 PROCEDURE — P9016: CPT

## 2021-10-26 PROCEDURE — 85018 HEMOGLOBIN: CPT

## 2021-10-26 PROCEDURE — 96374 THER/PROPH/DIAG INJ IV PUSH: CPT

## 2021-10-26 PROCEDURE — 83880 ASSAY OF NATRIURETIC PEPTIDE: CPT

## 2021-10-26 PROCEDURE — 84155 ASSAY OF PROTEIN SERUM: CPT

## 2021-10-26 PROCEDURE — 76536 US EXAM OF HEAD AND NECK: CPT

## 2021-10-26 PROCEDURE — 83521 IG LIGHT CHAINS FREE EACH: CPT

## 2021-10-26 PROCEDURE — 85025 COMPLETE CBC W/AUTO DIFF WBC: CPT

## 2021-10-26 PROCEDURE — 85027 COMPLETE CBC AUTOMATED: CPT

## 2021-10-26 PROCEDURE — 99285 EMERGENCY DEPT VISIT HI MDM: CPT

## 2021-10-26 PROCEDURE — 71250 CT THORAX DX C-: CPT | Mod: MA

## 2021-10-26 PROCEDURE — 80307 DRUG TEST PRSMV CHEM ANLYZR: CPT

## 2021-10-26 PROCEDURE — 97116 GAIT TRAINING THERAPY: CPT

## 2021-10-26 PROCEDURE — 80053 COMPREHEN METABOLIC PANEL: CPT

## 2021-10-26 PROCEDURE — 81001 URINALYSIS AUTO W/SCOPE: CPT

## 2021-10-26 PROCEDURE — 85045 AUTOMATED RETICULOCYTE COUNT: CPT

## 2021-10-26 PROCEDURE — 82728 ASSAY OF FERRITIN: CPT

## 2021-10-26 PROCEDURE — 36415 COLL VENOUS BLD VENIPUNCTURE: CPT

## 2021-10-26 PROCEDURE — 85014 HEMATOCRIT: CPT

## 2021-10-26 PROCEDURE — 84145 PROCALCITONIN (PCT): CPT

## 2021-10-26 PROCEDURE — 80162 ASSAY OF DIGOXIN TOTAL: CPT

## 2021-10-26 PROCEDURE — 0225U NFCT DS DNA&RNA 21 SARSCOV2: CPT

## 2021-10-26 PROCEDURE — 86140 C-REACTIVE PROTEIN: CPT

## 2021-10-26 PROCEDURE — 82553 CREATINE MB FRACTION: CPT

## 2021-10-26 PROCEDURE — 86850 RBC ANTIBODY SCREEN: CPT

## 2021-10-26 PROCEDURE — 36430 TRANSFUSION BLD/BLD COMPNT: CPT

## 2021-10-26 PROCEDURE — 83036 HEMOGLOBIN GLYCOSYLATED A1C: CPT

## 2021-10-26 PROCEDURE — 97530 THERAPEUTIC ACTIVITIES: CPT

## 2021-10-26 PROCEDURE — 82550 ASSAY OF CK (CPK): CPT

## 2021-10-26 PROCEDURE — 84443 ASSAY THYROID STIM HORMONE: CPT

## 2021-10-26 PROCEDURE — 82962 GLUCOSE BLOOD TEST: CPT

## 2021-10-26 PROCEDURE — 82607 VITAMIN B-12: CPT

## 2021-10-26 PROCEDURE — 86769 SARS-COV-2 COVID-19 ANTIBODY: CPT

## 2021-10-26 PROCEDURE — 84100 ASSAY OF PHOSPHORUS: CPT

## 2021-10-26 RX ORDER — POTASSIUM CHLORIDE 20 MEQ
40 PACKET (EA) ORAL ONCE
Refills: 0 | Status: COMPLETED | OUTPATIENT
Start: 2021-10-26 | End: 2021-10-26

## 2021-10-26 RX ADMIN — MIRTAZAPINE 30 MILLIGRAM(S): 45 TABLET, ORALLY DISINTEGRATING ORAL at 22:44

## 2021-10-26 RX ADMIN — FAMOTIDINE 20 MILLIGRAM(S): 10 INJECTION INTRAVENOUS at 11:48

## 2021-10-26 RX ADMIN — ISOSORBIDE MONONITRATE 60 MILLIGRAM(S): 60 TABLET, EXTENDED RELEASE ORAL at 11:48

## 2021-10-26 RX ADMIN — PREGABALIN 1000 MICROGRAM(S): 225 CAPSULE ORAL at 11:48

## 2021-10-26 RX ADMIN — BUDESONIDE AND FORMOTEROL FUMARATE DIHYDRATE 2 PUFF(S): 160; 4.5 AEROSOL RESPIRATORY (INHALATION) at 22:43

## 2021-10-26 RX ADMIN — Medication 100 MILLIGRAM(S): at 05:07

## 2021-10-26 RX ADMIN — Medication 0.2 MILLIGRAM(S): at 17:17

## 2021-10-26 RX ADMIN — ATORVASTATIN CALCIUM 10 MILLIGRAM(S): 80 TABLET, FILM COATED ORAL at 22:44

## 2021-10-26 RX ADMIN — Medication 100 MILLIGRAM(S): at 22:44

## 2021-10-26 RX ADMIN — AMLODIPINE BESYLATE 10 MILLIGRAM(S): 2.5 TABLET ORAL at 05:07

## 2021-10-26 RX ADMIN — Medication 81 MILLIGRAM(S): at 11:48

## 2021-10-26 RX ADMIN — Medication 8 MILLIGRAM(S): at 22:44

## 2021-10-26 RX ADMIN — Medication 100 MILLIGRAM(S): at 11:51

## 2021-10-26 RX ADMIN — Medication 60 MILLIGRAM(S): at 17:17

## 2021-10-26 RX ADMIN — Medication 150 MILLIGRAM(S): at 11:48

## 2021-10-26 RX ADMIN — Medication 60 MILLIGRAM(S): at 05:07

## 2021-10-26 RX ADMIN — Medication 0.2 MILLIGRAM(S): at 05:07

## 2021-10-26 RX ADMIN — ARIPIPRAZOLE 30 MILLIGRAM(S): 15 TABLET ORAL at 11:48

## 2021-10-26 RX ADMIN — Medication 1 MILLIGRAM(S): at 11:48

## 2021-10-26 RX ADMIN — LAMOTRIGINE 100 MILLIGRAM(S): 25 TABLET, ORALLY DISINTEGRATING ORAL at 11:48

## 2021-10-26 RX ADMIN — APIXABAN 5 MILLIGRAM(S): 2.5 TABLET, FILM COATED ORAL at 17:17

## 2021-10-26 RX ADMIN — Medication 40 MILLIEQUIVALENT(S): at 17:19

## 2021-10-26 RX ADMIN — APIXABAN 5 MILLIGRAM(S): 2.5 TABLET, FILM COATED ORAL at 05:07

## 2021-10-26 NOTE — PROGRESS NOTE ADULT - SUBJECTIVE AND OBJECTIVE BOX
[facility] -- Fifi Bliss, Kalpesh Valdovinos, Abraham Sheridan Savella, Goodger: Office # [phone]    Follow Up:   SOB, Chest pain, Hx of Afib    Subjective/Observations: Patient seen and examined. Patient awake, alert, OOB to chair. No complaints of chest pain, dyspnea, palpitations or dizziness. No signs of orthopnea or PND. Tolerating room air.     REVIEW OF SYSTEMS: All review of systems is negative for eye, ENT, GI, , allergic, dermatologic, musculoskeletal and neurologic except as described above    PAST MEDICAL & SURGICAL HISTORY:  HTN (hypertension)  c/b multiple episodes of hypertensive urgency    HLD (hyperlipidemia)    Atrial fibrillation  first documented on EKG 10/7/2021    CAD (coronary artery disease)  s/p stents (not on plavix)    Type 2 diabetes mellitus  not on home insulin/ Meds    Anxiety  Depression  multiple psych medications    History of diverticulitis  07/2021    Diverticulosis  c/b GIB in 2020    Afib  on AC    Stage 3 chronic kidney disease    Anemia of chronic disease  Monoclonal Gammopathy-MGUS  pRBC transfusion 10/15/21    Chronic diastolic congestive heart failure    Multiple thyroid nodules    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  Had surgery ( April 2013 )    S/P tonsillectomy    S/P arthroscopic knee surgery  Bilateral ( 2005 )    Torsion of testicle  Had surgery at age 14    Pilonidal cyst  Had surgery ( 1969 )    S/P cataract surgery  Bilateral    H/O hernia repair    MEDICATIONS  (STANDING):  amLODIPine   Tablet 10 milliGRAM(s) Oral daily  apixaban 5 milliGRAM(s) Oral every 12 hours  ARIPiprazole 30 milliGRAM(s) Oral daily  aspirin enteric coated 81 milliGRAM(s) Oral daily  atorvastatin 10 milliGRAM(s) Oral at bedtime  budesonide 160 MICROgram(s)/formoterol 4.5 MICROgram(s) Inhaler 2 Puff(s) Inhalation two times a day  cloNIDine 0.2 milliGRAM(s) Oral every 12 hours  cyanocobalamin 1000 MICROGram(s) Oral daily  doxazosin 8 milliGRAM(s) Oral at bedtime  famotidine    Tablet 20 milliGRAM(s) Oral daily  folic acid 1 milliGRAM(s) Oral daily  furosemide    Tablet 60 milliGRAM(s) Oral two times a day  hydrALAZINE 100 milliGRAM(s) Oral three times a day  isosorbide   mononitrate ER Tablet (IMDUR) 60 milliGRAM(s) Oral daily  lamoTRIgine 100 milliGRAM(s) Oral daily  mirtazapine 30 milliGRAM(s) Oral at bedtime  venlafaxine XR. 150 milliGRAM(s) Oral daily    MEDICATIONS  (PRN):    Allergies  No Known Allergies    Vital Signs Last 24 Hrs  T(C): 36.6 (26 Oct 2021 05:10), Max: 36.9 (25 Oct 2021 19:47)  T(F): 97.8 (26 Oct 2021 05:10), Max: 98.4 (25 Oct 2021 19:47)  HR: 65 (26 Oct 2021 05:10) (62 - 67)  BP: 158/83 (26 Oct 2021 05:10) (137/66 - 164/70)  BP(mean): --  RR: 18 (26 Oct 2021 05:10) (18 - 18)  SpO2: 95% (26 Oct 2021 05:10) (95% - 96%)  I&O's Summary    25 Oct 2021 07:01  -  26 Oct 2021 07:00  --------------------------------------------------------  IN: 0 mL / OUT: 1400 mL / NET: -1400 mL    TELE: Not on telemetry   PHYSICAL EXAM:  Appearance: NAD, no distress, alert, Well developed   HEENT: Moist Mucous Membranes, Anicteric  Cardiovascular: Regular rate and rhythm, Normal S1 S2, No JVD, No murmurs, No rubs, gallops or clicks  Respiratory: Non-labored, Clear to auscultation, No rales, No rhonchi, No wheezing.   Gastrointestinal:  Soft, Non-tender, + BS  Neurologic: Non-focal  Skin: Warm and dry, No visible rashes or ulcers, No ecchymosis, No cyanosis  Musculoskeletal: No clubbing, No cyanosis, No joint swelling/tenderness  Psychiatry: Mood & affect appropriate  Lymph: No peripheral edema.     LABS: All Labs Reviewed:                        8.3    6.18  )-----------( 263      ( 26 Oct 2021 08:34 )             26.9                         9.4    7.57  )-----------( 278      ( 25 Oct 2021 07:42 )             31.3                         8.8    7.20  )-----------( 257      ( 24 Oct 2021 10:04 )             29.6     26 Oct 2021 08:34    142    |  108    |  44     ----------------------------<  108    3.3     |  26     |  2.30   25 Oct 2021 07:42    142    |  108    |  41     ----------------------------<  112    3.5     |  26     |  2.30   24 Oct 2021 10:04    143    |  109    |  41     ----------------------------<  221    3.5     |  23     |  2.70     Ca    9.0        26 Oct 2021 08:34  Ca    9.7        25 Oct 2021 07:42  Ca    8.9        24 Oct 2021 10:04    TPro  6.7    /  Alb  2.8    /  TBili  0.3    /  DBili  x      /  AST  15     /  ALT  22     /  AlkPhos  49     26 Oct 2021 08:34  TPro  7.3    /  Alb  2.9    /  TBili  0.3    /  DBili  x      /  AST  17     /  ALT  22     /  AlkPhos  50     25 Oct 2021 07:42  TPro  7.2    /  Alb  2.7    /  TBili  0.2    /  DBili  x      /  AST  11     /  ALT  23     /  AlkPhos  50     24 Oct 2021 10:04    Creatine Kinase, Serum: 40 U/L (10-23-21 @ 14:13)  Troponin I, Serum: .022 ng/mL (10-23-21 @ 14:13)  Creatine Kinase, Serum: 44 U/L (10-23-21 @ 09:42)  Cholesterol, Serum: 174 mg/dL (10-07-21 @ 11:54)  HDL Cholesterol, Serum: 42 mg/dL (10-07-21 @ 11:54)  Triglycerides, Serum: 160 mg/dL (10-07-21 @ 11:54)  12 Lead ECG:   Ventricular Rate 87 BPM    Atrial Rate 166 BPM    QRS Duration 110 ms    Q-T Interval 392 ms    QTC Calculation(Bazett) 471 ms    R Axis 4 degrees    T Axis 15 degrees    Diagnosis Line Atrial fibrillation  Nonspecific ST and T wave abnormality  Prolonged QT  Abnormal ECG  Confirmed by lynne Monterroso ([id number]) on 10/23/2021 3:39:52 PM (10-23-21 @ 08:43)       EXAM:  ECHO TTE WO CON COMP W DOPP    PROCEDURE DATE:  10/13/2021    INTERPRETATION:  Ordering Physician: SKYLA TERRY [id number]  Indication: Cardiac arrhythmias.  Technician: INDIGO  Study Quality: Technical difficult study.  A complete echocardiographic study was performed utilizing standard protocol including spectral and color Doppler in all echocardiographic windows.  Height: 172cm  Weight: 102kg  BSA: 2.1m2  Blood Pressure: 150/90  MEASUREMENTS  IVS: 1.3cm  PWT: 1.3cm  LA: 4.2cm  AO: 3.5cm  LVIDd: 5.4 cm.  LVIDs: 3.5cm  LVEF: 55-60%.  PASP: 34mm Hg  FINDINGS  Left Ventricle: Normal in  size and normal LV systolic function with estimated LVEF 55-60%.  Right Ventricle: Normal in size and normal RV systolic function.  Left Atrium: Mild dilated.  Right Atrium: Normal in size.  Mitral Valve: Mild mitral annular calcification is present. Mitral valve is mildly calcified and no evidence of any mitral stenosis. Mild mitral regurgitation is present.  Aortic Valve: Aortic root is mildsclerotic and of normal dimension. Aortic valve is mildly calcified and mild  aortic stenosis is present with peak gradient across aortic valve is 30 mmHg. Aortic valve area not calculated.  Tricuspid Valve: Mild tricuspid regurgitation with calculated PA systolic pressure 34 mmHg is present. No evidence of any pulmonary hypertension  Pulmonic Valve: Trace Pulmonary regurgitation is present.  Diastolic Function: LV diastolic function cannot determine due to  underlying atrial fibrillation.  Pericardium/Pleura: No evidence of any pericardial effusion.  No intracardiac mass  thrombus or vegetations and  tumor.  Mild concentric left ventricular hypertrophy is present.    IMPRESSION:  Normal LV size with normal LV systolic function with estimated LVEF 55-60%.  LV diastolic function cannot determine due to atrial fibrillation.  Mild mitral regurgitation is present.  Mild aortic stenosis is  present.  Mild tricuspid regurgitation is present . No evidence of any pulmonary hypertension  Correlate clinically above findings.  --- End of Report ---    SKYLA TERRY MD; Attending Cardiologist  This document has been electronically signed. Oct 13 2021 11:55PM    EXAM:  US DPLX LWR EXT VEINS COMPL BI                        PROCEDURE DATE:  10/18/2021    INTERPRETATION:  CLINICAL INFORMATION: Lower extremity edema    COMPARISON: 07/20/2021.  TECHNIQUE: Duplex sonography of the BILATERAL LOWER extremity veins with color and spectral Doppler, with and without compression.    FINDINGS:  RIGHT:  Normal compressibility of the RIGHT common femoral, femoral and popliteal veins.  Doppler examination shows normal spontaneous and phasic flow.  No RIGHT calf vein thrombosis is detected.  Right popliteal fluid collection measures 4.2 x 3.3 x 1.3 cm.    LEFT:  Normal compressibility of the LEFT common femoral, femoral and popliteal veins.  Doppler examination shows normal spontaneous and phasic flow.  No LEFT calf vein thrombosis is detected.  Left popliteal fossa fluid collection measuring 4.9 x 2.8 x 1.3 cm.    IMPRESSION:  No evidence of deep venous thrombosis in either lower extremity.  Bilateral popliteal fossa fluid collections, likely Baker's cysts.  --- End of Report ---  ROMMEL GALEANO MD; Attending Radiologist  This document has been electronically signed. Oct 18 2021  2:21PM

## 2021-10-26 NOTE — PROGRESS NOTE ADULT - PROBLEM SELECTOR PLAN 3
- Pt was recent admitted to [facility] from 10/7-10/16 and treated for PNA with IV Zosyn & Augmentin x 7 days   - Although CxR shows bilateral lung parenchymal airspace opacities, increased from prior pt does not have leukocytosis or fever and it is suspected SOB is more so 2/2 CHF exacerbation. CxR findings are suspected to be from previously treated PNA  - s/p vanc and zosyn in ED. will hold off on further Abx at this time.as per Pulmonary Dr Day   - F/u Cultures: pending results, Nl Lactate   - official S&S: dysphagis 3, soft solids   - Pulm, Dr. Day,- Keep Off Abx , glorialey Old PNA   Continue Symbicort 2x day.

## 2021-10-26 NOTE — PROGRESS NOTE ADULT - SUBJECTIVE AND OBJECTIVE BOX
Patient is a 76y old  Male who presents with a chief complaint of shortness of breath (19 Oct 2021 09:10)  Patient seen in follow up for CKD 4, fluid overload.        PAST MEDICAL HISTORY:  Hypertension    Diabetes mellitus    Lupus    Hepatitis C    Anxiety and depression    CAD (coronary artery disease)    Diverticulosis    Hyperlipidemia    HTN (hypertension)    HLD (hyperlipidemia)    Atrial fibrillation    CAD (coronary artery disease)    Type 2 diabetes mellitus    Anxiety    History of diverticulitis    Diverticulosis    Afib    Stage 3 chronic kidney disease    Anemia of chronic disease    Chronic diastolic congestive heart failure    Multiple thyroid nodules      MEDICATIONS  (STANDING):  amLODIPine   Tablet 10 milliGRAM(s) Oral daily  apixaban 5 milliGRAM(s) Oral every 12 hours  ARIPiprazole 30 milliGRAM(s) Oral daily  aspirin enteric coated 81 milliGRAM(s) Oral daily  atorvastatin 10 milliGRAM(s) Oral at bedtime  budesonide 160 MICROgram(s)/formoterol 4.5 MICROgram(s) Inhaler 2 Puff(s) Inhalation two times a day  cloNIDine 0.2 milliGRAM(s) Oral every 12 hours  cyanocobalamin 1000 MICROGram(s) Oral daily  doxazosin 8 milliGRAM(s) Oral at bedtime  famotidine    Tablet 20 milliGRAM(s) Oral daily  folic acid 1 milliGRAM(s) Oral daily  furosemide    Tablet 60 milliGRAM(s) Oral two times a day  hydrALAZINE 100 milliGRAM(s) Oral three times a day  isosorbide   mononitrate ER Tablet (IMDUR) 60 milliGRAM(s) Oral daily  lamoTRIgine 100 milliGRAM(s) Oral daily  mirtazapine 30 milliGRAM(s) Oral at bedtime  venlafaxine XR. 150 milliGRAM(s) Oral daily    MEDICATIONS  (PRN):    T(C): 36.6 (10-26-21 @ 05:10), Max: 36.9 (10-25-21 @ 19:47)  HR: 65 (10-26-21 @ 05:10) (54 - 74)  BP: 158/83 (10-26-21 @ 05:10) (137/66 - 171/82)  RR: 18 (10-26-21 @ 05:10)  SpO2: 95% (10-26-21 @ 05:10)  Wt(kg): --  I&O's Detail    25 Oct 2021 07:01  -  26 Oct 2021 07:00  --------------------------------------------------------  IN:  Total IN: 0 mL    OUT:    Voided (mL): 1400 mL  Total OUT: 1400 mL    Total NET: -1400 mL                PHYSICAL EXAM:  General: no distress  Respiratory: b/l air entry  Cardiovascular: S1 S2  Gastrointestinal: soft  Extremities: + edema           LABORATORY:                        8.3    6.18  )-----------( 263      ( 26 Oct 2021 08:34 )             26.9     10-26    142  |  108  |  44<H>  ----------------------------<  108<H>  3.3<L>   |  26  |  2.30<H>    Ca    9.0      26 Oct 2021 08:34    TPro  6.7  /  Alb  2.8<L>  /  TBili  0.3  /  DBili  x   /  AST  15  /  ALT  22  /  AlkPhos  49  10-26    Sodium, Serum: 142 mmol/L (10-26 @ 08:34)  Sodium, Serum: 142 mmol/L (10-25 @ 07:42)    Potassium, Serum: 3.3 mmol/L (10-26 @ 08:34)  Potassium, Serum: 3.5 mmol/L (10-25 @ 07:42)    Hemoglobin: 8.3 g/dL (10-26 @ 08:34)  Hemoglobin: 9.4 g/dL (10-25 @ 07:42)  Hemoglobin: 8.8 g/dL (10-24 @ 10:04)    Creatinine, Serum 2.30 (10-26 @ 08:34)  Creatinine, Serum 2.30 (10-25 @ 07:42)  Creatinine, Serum 2.70 (10-24 @ 10:04)        LIVER FUNCTIONS - ( 26 Oct 2021 08:34 )  Alb: 2.8 g/dL / Pro: 6.7 g/dL / ALK PHOS: 49 U/L / ALT: 22 U/L / AST: 15 U/L / GGT: x           Urinalysis Basic - ( 26 Oct 2021 07:49 )    Color: Pale Yellow / Appearance: Clear / S.010 / pH: x  Gluc: x / Ketone: Negative  / Bili: Negative / Urobili: Negative   Blood: x / Protein: 100 / Nitrite: Negative   Leuk Esterase: Negative / RBC: 0-2 /HPF / WBC 0-2   Sq Epi: x / Non Sq Epi: Negative / Bacteria: x

## 2021-10-26 NOTE — PROGRESS NOTE ADULT - SUBJECTIVE AND OBJECTIVE BOX
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 with [facility]. Patient states his SOB started on  and has been worse with laying down. Does admit to intermittent CP associated with the SOB and also when getting imaging as that makes him anxious. Does not appear to have a clear understanding of his medical conditions. Also, states he had some bleeding in his mouth on , but there is no longer bleeding; on exam there is a healing cut on his tongue and inner right cheek with no active bleeding likely from the pt biting his tongue. Admits difficulty laying down straight due to SOB. Denies fevers, chills, abd pain, n/v, sore throat, cough, palpitations  Of note, pt was recently admitted to hospitals on 10/7/21 for hypertensive urgency, Afib and  findings of PNA. was Rxed with IV Zosyn x 7 days  Abx .pt also got 1 u pRBC transfusion past week.    ED Course:   Vitals: BP: 210/96-->181/84, HR: 79, Temp: 98 F, RR: 17, SpO2: 100% on RA-->89% on RA-->98% on 2L NC   Labs: H/H: 9.6/30.6, aPTT: 43, PT/INR: 18.9/1.65, BUN/Cr: 28/2.2, alb: 3.1, GFR: 28, procal: .16, trop: .059, proBNP:10,846  Imaging:   CxR:  Bilateral lung parenchymal airspace opacities, increased from prior. Small pleural effusions suggested  B/l LE Dopplers: No evidence of deep venous thrombosis in either lower extremity.  Bilateral popliteal fossa fluid collections, likely Baker's cysts.  EKG: HR: 74, Atrial fibrillation, Incomplete right bundle branch block, Nonspecific T wave abnormality, Prolonged QT(461)  Received in the ED: Vanco and Zosyn x2, IV lasix 60mg, 10mg Hydralazine IVP x2 (18 Oct 2021 17:24)    INTERVAL HPI: Patient seen and examined at bedside. No overnight events occurred. Patient complaining of mild R sided abdominal pain that comes and goes. Denies fevers, chills, headache, chest pain, dyspnea, abdominal pain, n/v/d/c. Pt complaining of occasional dysuria - says he has to work harder to push out urine on occasion. OOB to chair.      OVERNIGHT EVENTS: None.    Home Medications:  amLODIPine 10 mg oral tablet: 1 tab(s) orally once a day (25 Oct 2021 14:57)  apixaban 5 mg oral tablet: 1 tab(s) orally every 12 hours (25 Oct 2021 14:57)  ARIPiprazole 30 mg oral tablet: 1 tab(s) orally once a day (25 Oct 2021 14:57)  aspirin 81 mg oral delayed release tablet: 1 tab(s) orally once a day (25 Oct 2021 14:57)  atorvastatin 10 mg oral tablet: 1 tab(s) orally once a day (at bedtime) (25 Oct 2021 14:57)  budesonide-formoterol 160 mcg-4.5 mcg/inh inhalation aerosol:  inhaled  (25 Oct 2021 14:57)  cloNIDine 0.2 mg oral tablet: 1 tab(s) orally every 12 hours (25 Oct 2021 14:57)  cyanocobalamin 1000 mcg oral tablet: 1 tab(s) orally once a day (25 Oct 2021 14:57)  doxazosin 8 mg oral tablet: 1 tab(s) orally once a day (at bedtime) (25 Oct 2021 14:57)  famotidine 20 mg oral tablet: 1 tab(s) orally once a day (25 Oct 2021 14:57)  folic acid 1 mg oral tablet: 1 tab(s) orally once a day (25 Oct 2021 14:57)  furosemide 20 mg oral tablet: 3 tab(s) orally 2 times a day (25 Oct 2021 14:57)  hydrALAZINE 100 mg oral tablet: 1 tab(s) orally 3 times a day (25 Oct 2021 14:57)  isosorbide mononitrate 60 mg oral tablet, extended release: 1 tab(s) orally once a day (25 Oct 2021 14:57)  lamoTRIgine 100 mg oral tablet: 1 tab(s) orally once a day (25 Oct 2021 14:57)  mirtazapine 30 mg oral tablet: 1 tab(s) orally once a day (at bedtime) (25 Oct 2021 14:57)  venlafaxine 150 mg oral capsule, extended release: 1 cap(s) orally once a day (25 Oct 2021 14:57)      MEDICATIONS  (STANDING):  amLODIPine   Tablet 10 milliGRAM(s) Oral daily  apixaban 5 milliGRAM(s) Oral every 12 hours  ARIPiprazole 30 milliGRAM(s) Oral daily  aspirin enteric coated 81 milliGRAM(s) Oral daily  atorvastatin 10 milliGRAM(s) Oral at bedtime  budesonide 160 MICROgram(s)/formoterol 4.5 MICROgram(s) Inhaler 2 Puff(s) Inhalation two times a day  cloNIDine 0.2 milliGRAM(s) Oral every 12 hours  cyanocobalamin 1000 MICROGram(s) Oral daily  doxazosin 8 milliGRAM(s) Oral at bedtime  famotidine    Tablet 20 milliGRAM(s) Oral daily  folic acid 1 milliGRAM(s) Oral daily  furosemide    Tablet 60 milliGRAM(s) Oral two times a day  hydrALAZINE 100 milliGRAM(s) Oral three times a day  isosorbide   mononitrate ER Tablet (IMDUR) 60 milliGRAM(s) Oral daily  lamoTRIgine 100 milliGRAM(s) Oral daily  mirtazapine 30 milliGRAM(s) Oral at bedtime  venlafaxine XR. 150 milliGRAM(s) Oral daily    MEDICATIONS  (PRN):      Allergies    No Known Allergies    Intolerances        Social History:  Tobacco: quit in , not active smoker  EtOH: denies   Recreational drug use: cocaine several years ago "a few times" has not used in "many years"  Lives with: [facility]; group home; no nursing assistance; observation is there  Ambulates: independent, denies walker or cane but uses guard railings  ADLs: independent, but states need for assistance in bathing, cooking; independent with feeding, ambulating  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[place]  Vaccinations: Moderna x2 doses last dose in May (18 Oct 2021 17:24)      REVIEW OF SYSTEMS:  CONSTITUTIONAL: No fever, No chills, No fatigue, No myalgia, No Body ache, No Weakness  EYES: No eye pain,  No visual disturbances, No discharge, NO Redness  ENMT:  No ear pain, No nose bleed, No vertigo; No sinus pain, NO throat pain, No Congestion  NECK: No pain, No stiffness  RESPIRATORY: No cough, NO wheezing, No  hemoptysis, NO  shortness of breath  CARDIOVASCULAR: No chest pain, palpitations  GASTROINTESTINAL: No abdominal pain, NO epigastric pain. No nausea, No vomiting; No diarrhea, No constipation. [  ] BM  GENITOURINARY: admits dysuria, No frequency, No urgency, No hematuria, NO incontinence  NEUROLOGICAL: No headaches, No dizziness, No numbness, No tingling, No tremors, No weakness  EXT: No Swelling, No Pain, No Edema  SKIN:  [ x ] No itching, burning, rashes, or lesions   MUSCULOSKELETAL: No joint pain ,No Jt swelling; No muscle pain, No back pain, No extremity pain  PSYCHIATRIC: No depression,  No anxiety,  No mood swings ,No difficulty sleeping at night  PAIN SCALE: [ x ] None  [  ] Other-  ROS Unable to obtain due to - [  ] Dementia  [  ] Lethargy [  ] Drowsy [  ] Sedated [  ] non verbal  REST OF REVIEW Of SYSTEM - [ x ] Normal       Vital Signs Last 24 Hrs  T(C): 36.6 (26 Oct 2021 05:10), Max: 36.9 (25 Oct 2021 19:47)  T(F): 97.8 (26 Oct 2021 05:10), Max: 98.4 (25 Oct 2021 19:47)  HR: 65 (26 Oct 2021 05:10) (62 - 67)  BP: 158/83 (26 Oct 2021 05:10) (137/66 - 164/70)  BP(mean): --  RR: 18 (26 Oct 2021 05:10) (18 - 18)  SpO2: 95% (26 Oct 2021 05:10) (95% - 96%)  Finger Stick        10- @ 07:01  -  10- @ 07:00  --------------------------------------------------------  IN: 0 mL / OUT: 1400 mL / NET: -1400 mL        PHYSICAL EXAM: *Pt REFUSING full physical exam.  GENERAL:  [ x ] NAD , [ x ] well appearing, [  ] Agitated, [  ] Drowsy,  [  ] Lethargy, [  ] confused   HEAD:  [ x ] Normal, [  ] Other  EYES:  [ x ] EOMI, [  ] PERRLA, [  ] conjunctiva and sclera clear normal, [  ] Other,  [  ] Pallor,[  ] Discharge  ENMT:  [  ] Normal, [  ] Moist mucous membranes, [  ] Good dentition, [  ] No Thrush  NECK:  [  ] Supple, [  ] No JVD, [  ] Normal thyroid, [  ] Lymphadenopathy [  ] Other  CHEST/LUNG:  [  ] Clear to auscultation bilaterally, [  ] Breath Sounds equal B/L / Decrease, [  ] poor effort  [  ] No rales, [  ] No rhonchi  [  ]  No wheezing,   HEART:  [  ] Regular rate and rhythm, [  ] tachycardia, [  ] Bradycardia,  [  ] irregular  [  ] No murmurs, No rubs, No gallops, [  ] PPM in place (Mfr:  )  ABDOMEN:  [  ] Soft, [  ] Nontender, [  ] Nondistended, [  ] No mass, [  ] Bowel sounds present, [  ] obese  NERVOUS SYSTEM:  [  ] Alert & Oriented X3, [  ] Nonfocal  [  ] Confusion  [  ] Encephalopathic [  ] Sedated [  ] Unable to assess, [  ] Dementia [  ] Other-  EXTREMITIES: [  ] 2+ Peripheral Pulses, No clubbing, No cyanosis,  [  ] edema B/L lower EXT. [  ] PVD stasis skin changes B/L Lower EXT, [  ] wound  LYMPH: No lymphadenopathy noted  SKIN:  [  ] No rashes or lesions, [  ] Pressure Ulcers, [  ] ecchymosis, [  ] Skin Tears, [  ] Other    DIET: Diet, Dysphagia 3 Soft-Thin Liquids:   Low Sodium (10-19-21 @ 09:33)      LABS:                        8.3    6.18  )-----------( 263      ( 26 Oct 2021 08:34 )             26.9     26 Oct 2021 08:34    142    |  108    |  44     ----------------------------<  108    3.3     |  26     |  2.30     Ca    9.0        26 Oct 2021 08:34    TPro  6.7    /  Alb  2.8    /  TBili  0.3    /  DBili  x      /  AST  15     /  ALT  22     /  AlkPhos  49     26 Oct 2021 08:34      Urinalysis Basic - ( 26 Oct 2021 07:49 )    Color: Pale Yellow / Appearance: Clear / S.010 / pH: x  Gluc: x / Ketone: Negative  / Bili: Negative / Urobili: Negative   Blood: x / Protein: 100 / Nitrite: Negative   Leuk Esterase: Negative / RBC: 0-2 /HPF / WBC 0-2   Sq Epi: x / Non Sq Epi: Negative / Bacteria: x                CARDIAC MARKERS ( 23 Oct 2021 14:13 )  .022 ng/mL / x     / 40 U/L / x     / 1.4 ng/mL  CARDIAC MARKERS ( 23 Oct 2021 09:42 )  .034 ng/mL / x     / 44 U/L / x     / 1.6 ng/mL             Anemia Panel:      Thyroid Panel:                RADIOLOGY & ADDITIONAL TESTS:      HEALTH ISSUES - PROBLEM Dx:  Acute exacerbation of CHF (congestive heart failure)    Hypertensive urgency    HLD (hyperlipidemia)    CAD (coronary artery disease)  s/p stents (not on plavix)    Type 2 diabetes mellitus  not on home insulin    Atrial fibrillation  first documented on EKG 10/7/2021    Depression with anxiety    Pneumonia    DVT prophylaxis    Stage 3 chronic kidney disease    Anemia    Elevated troponin            Consultant(s) Notes Reviewed:  [  ] YES     Care Discussed with [X] Consultants  [  ] Patient  [  ] Family [  ] HCP [  ]   [  ] Social Service  [  ] RN, [  ] Physical Therapy,[  ] Palliative care team  DVT PPX: [  ] Lovenox, [  ] S C Heparin, [  ] Coumadin, [  ] Xarelto, [  ] Eliquis, [  ] Pradaxa, [  ] IV Heparin drip, [  ] SCD [  ] Contraindication 2 to GI Bleed,[  ] Ambulation [  ] Contraindicated 2 to  bleed [  ] Contraindicated 2 to Brain Bleed  Advanced directive: [  ] None, [  ] DNR/DNI Patient is a 76y old  Male who presents with a chief complaint of Hypertensive urgency  (21 Oct 2021 13:26)    HPI:  77 yo M with PMHx of Type 2 DM (not on insulin), BPH, CAD s/p stents >4 years ago (not on plavix), diverticulitis (hospitalized 2020 at \[facility]"), GIB 2/2 diverticulosis (), anxiety, Lupus, HTN, HLD, CKD stage 3, HepC, 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 Patient states his SOB started on  and has been worse with laying down. Does admit to intermittent CP associated with the SOB and also when getting imaging as that makes him anxious. Does not appear to have a clear understanding of his medical conditions. Also, states he had some bleeding in his mouth on , but there is no longer bleeding; on exam there is a healing cut on his tongue and inner right cheek with no active bleeding likely from the pt biting his tongue. Admits difficulty laying down straight due to SOB. Denies fevers, chills, abd pain, n/v, sore throat, cough, palpitations  Of note, pt was recently admitted to \[facility]\"" on 10/7/21 for hypertensive urgency, Afib and  findings of PNA. was Rxed with IV Zosyn x 7 days  Abx .pt also got 1 u pRBC transfusion past week.    ED Course:   Vitals: BP: 210/96-->181/84, HR: 79, Temp: 98 F, RR: 17, SpO2: 100% on RA-->89% on RA-->98% on 2L NC   Labs: H/H: 9.6/30.6, aPTT: 43, PT/INR: 18.9/1.65, BUN/Cr: 28/2.2, alb: 3.1, GFR: 28, procal: .16, trop: .059, proBNP:10,846  Imaging:   CxR:  Bilateral lung parenchymal airspace opacities, increased from prior. Small pleural effusions suggested  B/l LE Dopplers: No evidence of deep venous thrombosis in either lower extremity.  Bilateral popliteal fossa fluid collections, likely Baker's cysts.  EKG: HR: 74, Atrial fibrillation, Incomplete right bundle branch block, Nonspecific T wave abnormality, Prolonged QT(461)  Received in the ED: Vanco and Zosyn x2, IV lasix 60mg, 10mg Hydralazine IVP x2 (18 Oct 2021 17:24)    INTERVAL HPI:   10/19/21: Patient was seen and examined at bedside. Denies any complaints. States his shortness of breath has improved.   10/20/21: Patient was seen and examined at bedside. States he was not able to sleep well because of another patient screaming across the room. Otherwise denies worsening SOB, chest pain, palpitations.  10/21/21: Patient was seen and examined at bedside. Patient comfortably having breakfast in bed, alert and awake. States he has no further complaints. Stable Labs Low H/H . Cr stable.  10/22/21: No acute events overnight. Patient seen and examined at bedside. Patient denies any fever, chills, chest pain, dyspnea, abdominal pain, leg pain/swelling, dysuria, constipation, diarhea. Needs TB screening. PPD to be placed.  COVID test prior to discharge stabel labs   10/23/21: Patient was seen and examined at bedside. No acute events overnight. Patient complaining of atypical chest pain and palpitations. STAT ekg ordered- unchanged from prior. Ordered cardiac enzymes- will follow up. Ordered Cardizem 2.5mg x 1. No ACS  10/24/21: Patient was seen and examined at bedside. Denied of any acute complaints. States he had SOB overnight and was put on NC but respiratory status improved and patient was put on RA shortly after.   10/25/21: Pt seen, Examined, NO Complaints, pt is stable for d/c , DR Winkler follow up, NEG PPD -0MM induration, COVID PCR -NEG   10/26/21: Patient seen and examined at bedside. No overnight events occurred. Patient complaining of mild R sided abdominal pain that comes and goes. Denies fevers, chills, headache, chest pain, dyspnea, abdominal pain, n/v/d/c. Pt complaining of occasional dysuria - says he has to work harder to push out urine on occasion. OOB to chair.      OVERNIGHT EVENTS: None.    Home Medications:  amLODIPine 10 mg oral tablet: 1 tab(s) orally once a day (25 Oct 2021 14:57)  apixaban 5 mg oral tablet: 1 tab(s) orally every 12 hours (25 Oct 2021 14:57)  ARIPiprazole 30 mg oral tablet: 1 tab(s) orally once a day (25 Oct 2021 14:57)  aspirin 81 mg oral delayed release tablet: 1 tab(s) orally once a day (25 Oct 2021 14:57)  atorvastatin 10 mg oral tablet: 1 tab(s) orally once a day (at bedtime) (25 Oct 2021 14:57)  budesonide-formoterol 160 mcg-4.5 mcg/inh inhalation aerosol:  inhaled  (25 Oct 2021 14:57)  cloNIDine 0.2 mg oral tablet: 1 tab(s) orally every 12 hours (25 Oct 2021 14:57)  cyanocobalamin 1000 mcg oral tablet: 1 tab(s) orally once a day (25 Oct 2021 14:57)  doxazosin 8 mg oral tablet: 1 tab(s) orally once a day (at bedtime) (25 Oct 2021 14:57)  famotidine 20 mg oral tablet: 1 tab(s) orally once a day (25 Oct 2021 14:57)  folic acid 1 mg oral tablet: 1 tab(s) orally once a day (25 Oct 2021 14:57)  furosemide 20 mg oral tablet: 3 tab(s) orally 2 times a day (25 Oct 2021 14:57)  hydrALAZINE 100 mg oral tablet: 1 tab(s) orally 3 times a day (25 Oct 2021 14:57)  isosorbide mononitrate 60 mg oral tablet, extended release: 1 tab(s) orally once a day (25 Oct 2021 14:57)  lamoTRIgine 100 mg oral tablet: 1 tab(s) orally once a day (25 Oct 2021 14:57)  mirtazapine 30 mg oral tablet: 1 tab(s) orally once a day (at bedtime) (25 Oct 2021 14:57)  venlafaxine 150 mg oral capsule, extended release: 1 cap(s) orally once a day (25 Oct 2021 14:57)      MEDICATIONS  (STANDING):  amLODIPine   Tablet 10 milliGRAM(s) Oral daily  apixaban 5 milliGRAM(s) Oral every 12 hours  ARIPiprazole 30 milliGRAM(s) Oral daily  aspirin enteric coated 81 milliGRAM(s) Oral daily  atorvastatin 10 milliGRAM(s) Oral at bedtime  budesonide 160 MICROgram(s)/formoterol 4.5 MICROgram(s) Inhaler 2 Puff(s) Inhalation two times a day  cloNIDine 0.2 milliGRAM(s) Oral every 12 hours  cyanocobalamin 1000 MICROGram(s) Oral daily  doxazosin 8 milliGRAM(s) Oral at bedtime  famotidine    Tablet 20 milliGRAM(s) Oral daily  folic acid 1 milliGRAM(s) Oral daily  furosemide    Tablet 60 milliGRAM(s) Oral two times a day  hydrALAZINE 100 milliGRAM(s) Oral three times a day  isosorbide   mononitrate ER Tablet (IMDUR) 60 milliGRAM(s) Oral daily  lamoTRIgine 100 milliGRAM(s) Oral daily  mirtazapine 30 milliGRAM(s) Oral at bedtime  venlafaxine XR. 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 No nausea, No vomiting; No diarrhea, No constipation. [  ] BM  GENITOURINARY: admits dysuria, No frequency, No urgency, No hematuria, NO incontinence  NEUROLOGICAL: No headaches, No dizziness, No numbness, No tingling, No tremors, No weakness  EXT: No Swelling, No Pain, No Edema  SKIN:  [ x ] No itching, burning, rashes, or lesions   MUSCULOSKELETAL: No joint pain ,No Jt swelling; No muscle pain, No back pain, No extremity pain  PSYCHIATRIC: No depression,  No anxiety,  No mood swings ,No difficulty sleeping at night  PAIN SCALE: [ x ] None  [  ] Other-  ROS Unable to obtain due to - [  ] Dementia  [  ] Lethargy [  ] Drowsy [  ] Sedated [  ] non verbal  REST OF REVIEW Of SYSTEM - [ x ] Normal       Vital Signs Last 24 Hrs  T(C): 36.6 (26 Oct 2021 05:10), Max: 36.9 (25 Oct 2021 19:47)  T(F): 97.8 (26 Oct 2021 05:10), Max: 98.4 (25 Oct 2021 19:47)  HR: 65 (26 Oct 2021 05:10) (62 - 67)  BP: 158/83 (26 Oct 2021 05:10) (137/66 - 164/70)  BP(mean): --  RR: 18 (26 Oct 2021 05:10) (18 - 18)  SpO2: 95% (26 Oct 2021 05:10) (95% - 96%)  Finger Stick        10- @ 07:01  -  10- @ 07:00  --------------------------------------------------------  IN: 0 mL / OUT: 1400 mL / NET: -1400 mL        PHYSICAL EXAM: *Pt REFUSING full physical exam.  GENERAL:  [ x ] NAD , [ x ] well appearing, [  ] Agitated, [  ] Drowsy,  [  ] Lethargy, [  ] confused   HEAD:  [ x ] Normal, [  ] Other  EYES:  [ x ] EOMI, [  ] PERRLA, [  ] conjunctiva and sclera clear normal, [  ] Other,  [  ] Pallor,[  ] Discharge  ENMT:  [  ] Normal, [  ] Moist mucous membranes, [  ] Good dentition, [  ] No Thrush  NECK:  [  ] Supple, [  ] No JVD, [  ] Normal thyroid, [  ] Lymphadenopathy [  ] Other  CHEST/LUNG:  [  ] Clear to auscultation bilaterally, [  ] Breath Sounds equal B/L / Decrease, [  ] poor effort  [  ] No rales, [  ] No rhonchi  [  ]  No wheezing,   HEART:  [  ] Regular rate and rhythm, [  ] tachycardia, [  ] Bradycardia,  [  ] irregular  [  ] No murmurs, No rubs, No gallops, [  ] PPM in place (Mfr:  )  ABDOMEN:  [  ] Soft, [  ] Nontender, [  ] Nondistended, [  ] No mass, [  ] Bowel sounds present, [  ] obese  NERVOUS SYSTEM:  [  ] Alert & Oriented X3, [  ] Nonfocal  [  ] Confusion  [  ] Encephalopathic [  ] Sedated [  ] Unable to assess, [  ] Dementia [  ] Other-  EXTREMITIES: [  ] 2+ Peripheral Pulses, No clubbing, No cyanosis,  [  ] edema B/L lower EXT. [  ] PVD stasis skin changes B/L Lower EXT, [  ] wound  LYMPH: No lymphadenopathy noted  SKIN:  [  ] No rashes or lesions, [  ] Pressure Ulcers, [  ] ecchymosis, [  ] Skin Tears, [  ] Other    DIET: Diet, Dysphagia 3 Soft-Thin Liquids:   Low Sodium (10-19- @ 09:33)      LABS:                        8.3    6.18  )-----------( 263      ( 26 Oct 2021 08:34 )             26.9     26 Oct 2021 08:34    142    |  108    |  44     ----------------------------<  108    3.3     |  26     |  2.30     Ca    9.0        26 Oct 2021 08:34    TPro  6.7    /  Alb  2.8    /  TBili  0.3    /  DBili  x      /  AST  15     /  ALT  22     /  AlkPhos  49     26 Oct 2021 08:34      Urinalysis Basic - ( 26 Oct 2021 07:49 )    Color: Pale Yellow / Appearance: Clear / S.010 / pH: x  Gluc: x / Ketone: Negative  / Bili: Negative / Urobili: Negative   Blood: x / Protein: 100 / Nitrite: Negative   Leuk Esterase: Negative / RBC: 0-2 /HPF / WBC 0-2   Sq Epi: x / Non Sq Epi: Negative / Bacteria: x                CARDIAC MARKERS ( 23 Oct 2021 14:13 )  .022 ng/mL / x     / 40 U/L / x     / 1.4 ng/mL  CARDIAC MARKERS ( 23 Oct 2021 09:42 )  .034 ng/mL / x     / 44 U/L / x     / 1.6 ng/mL             Anemia Panel:      Thyroid Panel:                RADIOLOGY & ADDITIONAL TESTS:      HEALTH ISSUES - PROBLEM Dx:  Acute exacerbation of CHF (congestive heart failure)    Hypertensive urgency    HLD (hyperlipidemia)    CAD (coronary artery disease)  s/p stents (not on plavix)    Type 2 diabetes mellitus  not on home insulin    Atrial fibrillation  first documented on EKG 10/7/2021    Depression with anxiety    Pneumonia    DVT prophylaxis    Stage 3 chronic kidney disease    Anemia    Elevated troponin            Consultant(s) Notes Reviewed:  [  ] YES     Care Discussed with [X] Consultants  [  ] Patient  [  ] Family [  ] HCP [  ]   [  ] Social Service  [  ] RN, [  ] Physical Therapy,[  ] Palliative care team  DVT PPX: [  ] Lovenox, [  ] S C Heparin, [  ] Coumadin, [  ] Xarelto, [  ] Eliquis, [  ] Pradaxa, [  ] IV Heparin drip, [  ] SCD [  ] Contraindication 2 to GI Bleed,[  ] Ambulation [  ] Contraindicated 2 to  bleed [  ] Contraindicated 2 to Brain Bleed  Advanced directive: [  ] None, [  ] DNR/DNI Patient is a 76y old  Male who presents with a chief complaint of Hypertensive urgency  (21 Oct 2021 13:26)    HPI:  77 yo M with PMHx of Type 2 DM (not on insulin), BPH, CAD s/p stents >4 years ago (not on plavix), diverticulitis (hospitalized 2020 at [facility]), GIB 2/2 diverticulosis (), anxiety, Lupus, HTN, HLD, CKD stage 3, HepC, 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 Patient states his SOB started on  and has been worse with laying down. Does admit to intermittent CP associated with the SOB and also when getting imaging as that makes him anxious. Does not appear to have a clear understanding of his medical conditions. Also, states he had some bleeding in his mouth on , but there is no longer bleeding; on exam there is a healing cut on his tongue and inner right cheek with no active bleeding likely from the pt biting his tongue. Admits difficulty laying down straight due to SOB. Denies fevers, chills, abd pain, n/v, sore throat, cough, palpitations  Of note, pt was recently admitted to [facility] on 10/7/21 for hypertensive urgency, Afib and  findings of PNA. was Rxed with IV Zosyn x 7 days  Abx .pt also got 1 u pRBC transfusion past week.    ED Course:   Vitals: BP: 210/96-->181/84, HR: 79, Temp: 98 F, RR: 17, SpO2: 100% on RA-->89% on RA-->98% on 2L NC   Labs: H/H: 9.6/30.6, aPTT: 44, PT/INR: 18.9/1.65, BUN/Cr: 28/2.2, alb: 3.1, GFR: 28, procal: .16, trop: .059, proBNP:10,846  Imaging:   CxR:  Bilateral lung parenchymal airspace opacities, increased from prior. Small pleural effusions suggested  B/l LE Dopplers: No evidence of deep venous thrombosis in either lower extremity.  Bilateral popliteal fossa fluid collections, likely Baker's cysts.  EKG: HR: 74, Atrial fibrillation, Incomplete right bundle branch block, Nonspecific T wave abnormality, Prolonged QT(461)  Received in the ED: Vanco and Zosyn x2, IV lasix 60mg, 10mg Hydralazine IVP x2 (18 Oct 2021 17:24)    INTERVAL HPI:   10/19/21: Patient was seen and examined at bedside. Denies any complaints. States his shortness of breath has improved.   10/20/21: Patient was seen and examined at bedside. States he was not able to sleep well because of another patient screaming across the room. Otherwise denies worsening SOB, chest pain, palpitations.  10/21/21: Patient was seen and examined at bedside. Patient comfortably having breakfast in bed, alert and awake. States he has no further complaints. Stable Labs Low H/H . Cr stable.  10/22/21: No acute events overnight. Patient seen and examined at bedside. Patient denies any fever, chills, chest pain, dyspnea, abdominal pain, leg pain/swelling, dysuria, constipation, diarhea. Needs TB screening. PPD to be placed.  COVID test prior to discharge stabel labs   10/23/21: Patient was seen and examined at bedside. No acute events overnight. Patient complaining of atypical chest pain and palpitations. STAT ekg ordered- unchanged from prior. Ordered cardiac enzymes- will follow up. Ordered Cardizem 2.5mg x 1. No ACS  10/24/21: Patient was seen and examined at bedside. Denied of any acute complaints. States he had SOB overnight and was put on NC but respiratory status improved and patient was put on RA shortly after.   10/25/21: Pt seen, Examined, NO Complaints, pt is stable for d/c , DR Winkler follow up, NEG PPD -0MM induration, COVID PCR -NEG   10/26/21: Patient seen and examined at bedside. No overnight events occurred. Patient complaining of mild R sided abdominal pain that comes and goes. Denies fevers, chills, headache, chest pain, dyspnea, abdominal pain, n/v/d/c. Pt complaining of occasional dysuria - says he has to work harder to push out urine on occasion. OOB to chair.      OVERNIGHT EVENTS: None.    Home Medications:  amLODIPine 10 mg oral tablet: 1 tab(s) orally once a day (25 Oct 2021 14:57)  apixaban 5 mg oral tablet: 1 tab(s) orally every 12 hours (25 Oct 2021 14:57)  ARIPiprazole 30 mg oral tablet: 1 tab(s) orally once a day (25 Oct 2021 14:57)  aspirin 81 mg oral delayed release tablet: 1 tab(s) orally once a day (25 Oct 2021 14:57)  atorvastatin 10 mg oral tablet: 1 tab(s) orally once a day (at bedtime) (25 Oct 2021 14:57)  budesonide-formoterol 160 mcg-4.5 mcg/inh inhalation aerosol:  inhaled  (25 Oct 2021 14:57)  cloNIDine 0.2 mg oral tablet: 1 tab(s) orally every 12 hours (25 Oct 2021 14:57)  cyanocobalamin 1000 mcg oral tablet: 1 tab(s) orally once a day (25 Oct 2021 14:57)  doxazosin 8 mg oral tablet: 1 tab(s) orally once a day (at bedtime) (25 Oct 2021 14:57)  famotidine 20 mg oral tablet: 1 tab(s) orally once a day (25 Oct 2021 14:57)  folic acid 1 mg oral tablet: 1 tab(s) orally once a day (25 Oct 2021 14:57)  furosemide 20 mg oral tablet: 3 tab(s) orally 2 times a day (25 Oct 2021 14:57)  hydrALAZINE 100 mg oral tablet: 1 tab(s) orally 3 times a day (25 Oct 2021 14:57)  isosorbide mononitrate 60 mg oral tablet, extended release: 1 tab(s) orally once a day (25 Oct 2021 14:57)  lamoTRIgine 100 mg oral tablet: 1 tab(s) orally once a day (25 Oct 2021 14:57)  mirtazapine 30 mg oral tablet: 1 tab(s) orally once a day (at bedtime) (25 Oct 2021 14:57)  venlafaxine 150 mg oral capsule, extended release: 1 cap(s) orally once a day (25 Oct 2021 14:57)      MEDICATIONS  (STANDING):  amLODIPine   Tablet 10 milliGRAM(s) Oral daily  apixaban 5 milliGRAM(s) Oral every 12 hours  ARIPiprazole 30 milliGRAM(s) Oral daily  aspirin enteric coated 81 milliGRAM(s) Oral daily  atorvastatin 10 milliGRAM(s) Oral at bedtime  budesonide 160 MICROgram(s)/formoterol 4.5 MICROgram(s) Inhaler 2 Puff(s) Inhalation two times a day  cloNIDine 0.2 milliGRAM(s) Oral every 12 hours  cyanocobalamin 1000 MICROGram(s) Oral daily  doxazosin 8 milliGRAM(s) Oral at bedtime  famotidine    Tablet 20 milliGRAM(s) Oral daily  folic acid 1 milliGRAM(s) Oral daily  furosemide    Tablet 60 milliGRAM(s) Oral two times a day  hydrALAZINE 100 milliGRAM(s) Oral three times a day  isosorbide   mononitrate ER Tablet (IMDUR) 60 milliGRAM(s) Oral daily  lamoTRIgine 100 milliGRAM(s) Oral daily  mirtazapine 30 milliGRAM(s) Oral at bedtime  venlafaxine XR. 150 milliGRAM(s) Oral daily    MEDICATIONS  (PRN):      Allergies    No Known Allergies    Intolerances        Social History:  Tobacco: quit in , not active smoker  EtOH: denies   Recreational drug use: cocaine several years ago "a few times" has not used in "many years"  Lives with: [facility]; group home; no nursing assistance; observation is there  Ambulates: independent, denies walker or cane but uses guard railings  ADLs: independent, but states need for assistance in bathing, cooking; independent with feeding, ambulating  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[place]  Vaccinations: Moderna x2 doses last dose in May (18 Oct 2021 17:24)      REVIEW OF SYSTEMS:  CONSTITUTIONAL: No fever, No chills, No fatigue, No myalgia, No Body ache, No Weakness  EYES: No eye pain,  No visual disturbances, No discharge, NO Redness  ENMT:  No ear pain, No nose bleed, No vertigo; No sinus pain, NO throat pain, No Congestion  NECK: No pain, No stiffness  RESPIRATORY: No cough, NO wheezing, No  hemoptysis, NO  shortness of breath  CARDIOVASCULAR: No chest pain, palpitations  GASTROINTESTINAL: No abdominal pain, NO epigastric pain. No nausea, No vomiting; No diarrhea, No constipation. [  ] BM  GENITOURINARY: admits dysuria, No frequency, No urgency, No hematuria, NO incontinence  NEUROLOGICAL: No headaches, No dizziness, No numbness, No tingling, No tremors, No weakness  EXT: No Swelling, No Pain, No Edema  SKIN:  [ x ] No itching, burning, rashes, or lesions   MUSCULOSKELETAL: No joint pain ,No Jt swelling; No muscle pain, No back pain, No extremity pain  PSYCHIATRIC: No depression,  No anxiety,  No mood swings ,No difficulty sleeping at night  PAIN SCALE: [ x ] None  [  ] Other-  ROS Unable to obtain due to - [  ] Dementia  [  ] Lethargy [  ] Drowsy [  ] Sedated [  ] non verbal  REST OF REVIEW Of SYSTEM - [ x ] Normal       Vital Signs Last 24 Hrs  T(C): 36.6 (26 Oct 2021 05:10), Max: 36.9 (25 Oct 2021 19:47)  T(F): 97.8 (26 Oct 2021 05:10), Max: 98.4 (25 Oct 2021 19:47)  HR: 65 (26 Oct 2021 05:10) (62 - 67)  BP: 158/83 (26 Oct 2021 05:10) (137/66 - 164/70)  BP(mean): --  RR: 18 (26 Oct 2021 05:10) (18 - 18)  SpO2: 95% (26 Oct 2021 05:10) (95% - 96%)  Finger Stick        10- @ 07:01  -  10- @ 07:00  --------------------------------------------------------  IN: 0 mL / OUT: 1400 mL / NET: -1400 mL        PHYSICAL EXAM: *Pt REFUSING full physical exam.  GENERAL:  [ x ] NAD , [ x ] well appearing, [  ] Agitated, [  ] Drowsy,  [  ] Lethargy, [  ] confused   HEAD:  [ x ] Normal, [  ] Other  EYES:  [ x ] EOMI, [  ] PERRLA, [ x ] conjunctiva and sclera clear normal, [  ] Other,  [  ] Pallor,[  ] Discharge  ENMT:  [x  ] Normal, [ x ] Moist mucous membranes, [ x ] Good dentition, [x  ] No Thrush  NECK:  [x  ] Supple, [ x ] No JVD, [ x ] Normal thyroid, [  ] Lymphadenopathy [  ] Other  CHEST/LUNG:  [ x ] Clear to auscultation bilaterally, [ x ] Breath Sounds equal B/L / Decrease, [x  ] poor effort  [x  ] No rales, [x  ] No rhonchi  [x  ]  No wheezing,   HEART:  [  ] Regular rate and rhythm, [  ] tachycardia, [  ] Bradycardia,  [x  ] irregular  [ x ] No murmurs, No rubs, No gallops, [  ] PPM in place (Mfr:  )  ABDOMEN:  [ x ] Soft, [x  ] Nontender, [x  ] Nondistended, [  ] No mass, [  ] Bowel sounds present, [  ] obese  NERVOUS SYSTEM:  [ x ] Alert & Oriented X 2, [ x ] Nonfocal  [  ] Confusion  [  ] Encephalopathic [  ] Sedated [  ] Unable to assess, [  ] Dementia [x  ] Other- Forgetful   EXTREMITIES: [  ] 2+ Peripheral Pulses, No clubbing, No cyanosis,  [  ] edema B/L lower EXT. [  ] PVD stasis skin changes B/L Lower EXT, [  ] wound  LYMPH: No lymphadenopathy noted  SKIN:  [ x ] No rashes or lesions, [  ] Pressure Ulcers, [  ] ecchymosis, [  ] Skin Tears, [ x ] Other-Tattoos on Ext    DIET: Diet, Dysphagia 3 Soft-Thin Liquids:   Low Sodium (10-19-21 @ 09:33)      LABS:                        8.3    6.18  )-----------( 263      ( 26 Oct 2021 08:34 )             26.9     26 Oct 2021 08:34    142    |  108    |  44     ----------------------------<  108    3.3     |  26     |  2.30     Ca    9.0        26 Oct 2021 08:34    TPro  6.7    /  Alb  2.8    /  TBili  0.3    /  DBili  x      /  AST  15     /  ALT  22     /  AlkPhos  49     26 Oct 2021 08:34      Urinalysis Basic - ( 26 Oct 2021 07:49 )    Color: Pale Yellow / Appearance: Clear / S.010 / pH: x  Gluc: x / Ketone: Negative  / Bili: Negative / Urobili: Negative   Blood: x / Protein: 100 / Nitrite: Negative   Leuk Esterase: Negative / RBC: 0-2 /HPF / WBC 0-2   Sq Epi: x / Non Sq Epi: Negative / Bacteria: x      CARDIAC MARKERS ( 23 Oct 2021 14:13 )  .022 ng/mL / x     / 40 U/L / x     / 1.4 ng/mL  CARDIAC MARKERS ( 23 Oct 2021 09:42 )  .034 ng/mL / x     / 44 U/L / x     / 1.6 ng/mL      RADIOLOGY & ADDITIONAL TESTS:  NONE    HEALTH ISSUES - PROBLEM Dx:  Acute exacerbation of CHF (congestive heart failure)    Hypertensive urgency    HLD (hyperlipidemia)    CAD (coronary artery disease)  s/p stents (not on plavix)    Type 2 diabetes mellitus  not on home insulin    Atrial fibrillation  first documented on EKG 10/7/2021    Depression with anxiety    Pneumonia    DVT prophylaxis    Stage 3 chronic kidney disease    Anemia    Elevated troponin      Consultant(s) Notes Reviewed:  [ x ] YES     Care Discussed with [X] Consultants  x[  ] Patient  [  ] Family [  ] HCP [  ]   [ x ] Social Service  [ x ] RN, [  ] Physical Therapy,[  ] Palliative care team  DVT PPX: [  ] Lovenox, [  ] S C Heparin, [  ] Coumadin, [  ] Xarelto, [ x ] Eliquis, [  ] Pradaxa, [  ] IV Heparin drip, [  ] SCD [  ] Contraindication 2 to GI Bleed,[  ] Ambulation [  ] Contraindicated 2 to  bleed [  ] Contraindicated 2 to Brain Bleed  Advanced directive: [ x ] None, [  ] DNR/DNI

## 2021-10-26 NOTE — PROGRESS NOTE ADULT - ATTENDING COMMENTS
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 Pt was already Rxed for PNA with IV Zosyn last week,  Admitted for ac on Chronic Diastolic  CHF exacerbation and HTN urgency.  Pt seen, Examined, case & care plan d/w pt, residents at detail.  -Concern for NON Compliance with meds [facility].  Pulmonary-Dr Day follow up  Cardio-DR Rosario follow up -Stable BP  Psych-DR Winkler d/w at detail, patient appears to have limited insight into medication , No capacity   PT Eval.---> NO PT need  Social service Eval. d/w at detail, start D/C Plan .   PPD  placed-Rt Forearm- 0 mm Induration -NEG , plan for COVID 19 PCR NEG   Total care time is 45 minutes. , pt is stable for d/c .

## 2021-10-26 NOTE — PROGRESS NOTE ADULT - ASSESSMENT
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

## 2021-10-26 NOTE — PROGRESS NOTE ADULT - PROBLEM SELECTOR PLAN 2
- Pt presented with /96 --- unclear if pt is compliant with his medications as he does not appear to have a good understanding of his medical conditions --- Psych, Dr. Winkler, consulted for assessment of capacity- ok to discharge from psych standpoint   - No vision changes, headache, ARIELLA, current CP, ECHO done.  - C/w with home antihypertensives:  Lasix 60 mg 2x day  -  Continue clonidine 0.1 TID  - On hydralazine 100 mg q 8 hrs   - continue amlodipine 10 mg qd  - continue nifedipine XL  - continue  Imdur 30mg   - Cardio, Dr. Bliss's group, following. - Pt presented with /96 --- unclear if pt is compliant with his medications as he does not appear to have a good understanding of his medical conditions --- Psych, Dr. Winkler, consulted for assessment of capacity- ok to discharge from psych standpoint   - No vision changes, headache, ARIELLA, current CP, ECHO done.  - C/w with home antihypertensives:   - Lasix 60 mg 2x day  -  Continue clonidine 0.2  2x day  - On hydralazine 100 mg q 8 hrs   - continue amlodipine 10 mg qd  -Cardura 8 mg daily  - Increase   Imdur 60 mg Qd   -- STOP nifedipine XL  - Cardio, Dr. Bliss's group, following.

## 2021-10-26 NOTE — PROGRESS NOTE ADULT - ASSESSMENT
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) living in [facility]    clonidine and imdur added for BP control  on lasix  vs noted  PPD read neg   dc planning - cm notes reviewed -     completed ABX for PNA  ct chest from recent admission reviewed, cxr noted, repeat CT chest reviewed - multifocal pna reported - however - clinically does not correlate -   would monitor off ABX - biomarkers - cx - monitor VS and Sat  assess - eval for HF - vol overload - Diuresis - I and O - cardio eval - TTE reviewed  ELMER - does not tolerate CPAP  CKD - monitor renal indices - i and o - replete lytes  dysphagia diet - asp prec - HOB elev - oral hygiene  assist with needs - ADL  emotional support  tele monitor  old records reviewed

## 2021-10-26 NOTE — PROGRESS NOTE ADULT - ASSESSMENT
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 Cardiology consulted for SOB and chest pain    Pleural effusion  - CT chest showed small B/L pleural effusions and multifocal Pna s/p Abx.  Has been on RA  - ECHO LVEF 55-60%. LV diastolic function cannot determine due to atrial fibrillation. Mild mitral regurgitation is present. Mild aortic stenosis is  present. Mild tricuspid regurgitation is present . No evidence of any pulmonary hypertension  - BNP: 70386 S/P Lasix IV 60mg Q12, now euvolemic and is on PO Lasix 60 mg PO BID   - Creatinine trend:  <--2.30,  <--2.30,  <--2.70,  <--2.40,  <--2.60  - Daily weight,. Monitor Strict I/O's,    Chest pain (Atypical)   - Patient with hx of CAD s/p stents  - EKG showed Afib, no ischemic changes noted  - Mild troponin leak likely in setting of ARIELLA, peaked and trended down  - No clinical evidence of ACS  - Continue with ASA and statin     Afib  - Rate-controlled.  Not on any AVN blocker  - Continue Eliquis  - Monitor electrolytes, replete to keep K>4 and Mag>2    Hypertensive Urgency   - Presented with /90 s/p Hydralazine in ED 10 mg IV X2 doses  - BP improved  - BP: 158/83 (10-26-21 @ 05:10) (137/66 - 164/70)  - Continue Hydralazine 100 mg q8H  - Continue Norvasc 10 mg   - Continue Imdur 60 and Clonidine 0.2.   - Can continue to increase Imdur if needed    - Monitor and replete lytes, keep K>4, Mg>2.  - All other medical needs as per primary team.  - Other cardiovascular workup will depend on clinical course.  - Will continue to follow.    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 Cardiology consulted for SOB and chest pain    Pleural effusion  - CT chest showed small B/L pleural effusions and multifocal Pna s/p Abx.  Has been on RA  - ECHO LVEF 55-60%. LV diastolic function cannot determine due to atrial fibrillation. Mild mitral regurgitation is present. Mild aortic stenosis is  present. Mild tricuspid regurgitation is present . No evidence of any pulmonary hypertension  - BNP: 55408 S/P Lasix IV 60mg Q12, now euvolemic and is on PO Lasix 60 mg PO BID   - Creatinine trend:  <--2.30,  <--2.30,  <--2.70,  <--2.40,  <--2.60  - Daily weight,. Monitor Strict I/O's,    Chest pain (Atypical)   - Patient with hx of CAD s/p stents  - EKG showed Afib, no ischemic changes noted  - Mild troponin leak likely in setting of ARIELLA, peaked and trended down  - No clinical evidence of ACS  - Continue with ASA and statin     Afib  - Rate-controlled.  Not on any AVN blocker  - Continue Eliquis  - Monitor electrolytes, replete to keep K>4 and Mag>2    Hypertensive Urgency   - Presented with /90 s/p Hydralazine in ED 10 mg IV X2 doses  - BP improved  - BP: 158/83 (10-26-21 @ 05:10) (137/66 - 164/70)  - Continue Hydralazine 100 mg q8H  - Continue Norvasc 10 mg   - Continue Imdur 60 and Clonidine 0.2.   - increase Imdur to 60 q12.     - Monitor and replete lytes, keep K>4, Mg>2.  - All other medical needs as per primary team.  - Other cardiovascular workup will depend on clinical course.  - Will continue to follow.    Bonita Way MS NOEMYP, [facility]  Nurse Practitioner- Cardiology   Spectra #[phone]

## 2021-10-26 NOTE — PROGRESS NOTE ADULT - PROBLEM SELECTOR PLAN 1
- Pt presented with SOB worse with laying down and with LE edema Since Sunday  - CxR: Bilateral lung parenchymal airspace opacities, increased from prior. Small pleural effusions suggested  - B/l LE Dopplers: No evidence of deep venous thrombosis in either lower extremity.  - BNP: 14152  - TTE on 10/13/21(previous admission): LVEF 55-60%. LV diastolic function cannot determine due to atrial fibrillation. -- Do not need to repeat at time AC on Chronic Diastolic CHF   - supplemental O2 as needed   - s/p 60mg IVP-stop  - cont Lasix 60mg PO BID  - am CT chest:   1. Small bilateral pleural effusions.  2. Multifocal bilateral pneumonia, organism nonspecific, does not have the characteristic appearance of COVID-19 but is within range of what can be seen with COVID-19 pneumonia.  - Monitor fluid status closely  - Cardio, Dr. Bliss's group, following.  - Pulm following- recommends monitoring off antibiotics at this time.

## 2021-10-26 NOTE — PROGRESS NOTE ADULT - SUBJECTIVE AND OBJECTIVE BOX
Date/Time Patient Seen:  		  Referring MD:   Data Reviewed	       Patient is a 76y old  Male who presents with a chief complaint of Hypertensive urgency  (21 Oct 2021 13:26)      Subjective/HPI     PAST MEDICAL & SURGICAL HISTORY:  Hypertension    Diabetes mellitus    Lupus    Hepatitis C    Anxiety and depression    CAD (coronary artery disease)  s/p stents    Diverticulosis    Hyperlipidemia    HTN (hypertension)  c/b multiple episodes of hypertensive urgency    HLD (hyperlipidemia)    Atrial fibrillation  first documented on EKG 10/7/2021    CAD (coronary artery disease)  s/p stents (not on plavix)    Type 2 diabetes mellitus  not on home insulin/ Meds    Anxiety  Depression  multiple psych medications    History of diverticulitis  07/2021    Diverticulosis  c/b GIB in 2020    Afib  on AC    Stage 3 chronic kidney disease    Anemia of chronic disease  Monoclonal Gammopathy-MGUS  pRBC transfusion 10/15/21    Chronic diastolic congestive heart failure    Multiple thyroid nodules    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  Had surgery ( April 2013 )    S/P tonsillectomy    S/P arthroscopic knee surgery  Bilateral ( 2005 )    Torsion of testicle  Had surgery at age 14    Pilonidal cyst  Had surgery ( 1969 )    S/P cataract surgery  Bilateral    H/O hernia repair          Medication list         MEDICATIONS  (STANDING):  amLODIPine   Tablet 10 milliGRAM(s) Oral daily  apixaban 5 milliGRAM(s) Oral every 12 hours  ARIPiprazole 30 milliGRAM(s) Oral daily  aspirin enteric coated 81 milliGRAM(s) Oral daily  atorvastatin 10 milliGRAM(s) Oral at bedtime  budesonide 160 MICROgram(s)/formoterol 4.5 MICROgram(s) Inhaler 2 Puff(s) Inhalation two times a day  cloNIDine 0.2 milliGRAM(s) Oral every 12 hours  cyanocobalamin 1000 MICROGram(s) Oral daily  doxazosin 8 milliGRAM(s) Oral at bedtime  famotidine    Tablet 20 milliGRAM(s) Oral daily  folic acid 1 milliGRAM(s) Oral daily  furosemide    Tablet 60 milliGRAM(s) Oral two times a day  hydrALAZINE 100 milliGRAM(s) Oral three times a day  isosorbide   mononitrate ER Tablet (IMDUR) 60 milliGRAM(s) Oral daily  lamoTRIgine 100 milliGRAM(s) Oral daily  mirtazapine 30 milliGRAM(s) Oral at bedtime  venlafaxine XR. 150 milliGRAM(s) Oral daily    MEDICATIONS  (PRN):         Vitals log        ICU Vital Signs Last 24 Hrs  T(C): 36.6 (26 Oct 2021 05:10), Max: 36.9 (25 Oct 2021 19:47)  T(F): 97.8 (26 Oct 2021 05:10), Max: 98.4 (25 Oct 2021 19:47)  HR: 65 (26 Oct 2021 05:10) (62 - 67)  BP: 158/83 (26 Oct 2021 05:10) (137/66 - 164/70)  BP(mean): --  ABP: --  ABP(mean): --  RR: 18 (26 Oct 2021 05:10) (18 - 18)  SpO2: 95% (26 Oct 2021 05:10) (95% - 96%)           Input and Output:  I&O's Detail    24 Oct 2021 07:01  -  25 Oct 2021 07:00  --------------------------------------------------------  IN:  Total IN: 0 mL    OUT:    Voided (mL): 2400 mL  Total OUT: 2400 mL    Total NET: -2400 mL      25 Oct 2021 07:01  -  26 Oct 2021 06:24  --------------------------------------------------------  IN:  Total IN: 0 mL    OUT:    Voided (mL): 1400 mL  Total OUT: 1400 mL    Total NET: -1400 mL          Lab Data                        9.4    7.57  )-----------( 278      ( 25 Oct 2021 07:42 )             31.3     10-25    142  |  108  |  41<H>  ----------------------------<  112<H>  3.5   |  26  |  2.30<H>    Ca    9.7      25 Oct 2021 07:42    TPro  7.3  /  Alb  2.9<L>  /  TBili  0.3  /  DBili  x   /  AST  17  /  ALT  22  /  AlkPhos  50  10-25            Review of Systems	      Objective     Physical Examination    heart s1s2  lung dec BS  abd soft      Pertinent Lab findings & Imaging      Nikko:  NO   Adequate UO     I&O's Detail    24 Oct 2021 07:01  -  25 Oct 2021 07:00  --------------------------------------------------------  IN:  Total IN: 0 mL    OUT:    Voided (mL): 2400 mL  Total OUT: 2400 mL    Total NET: -2400 mL      25 Oct 2021 07:01  -  26 Oct 2021 06:24  --------------------------------------------------------  IN:  Total IN: 0 mL    OUT:    Voided (mL): 1400 mL  Total OUT: 1400 mL    Total NET: -1400 mL               Discussed with:     Cultures:	        Radiology

## 2021-10-27 LAB
ALBUMIN SERPL ELPH-MCNC: 2.8 G/DL — LOW (ref 3.3–5)
ALP SERPL-CCNC: 50 U/L — SIGNIFICANT CHANGE UP (ref 40–120)
ALT FLD-CCNC: 24 U/L — SIGNIFICANT CHANGE UP (ref 12–78)
ANION GAP SERPL CALC-SCNC: 8 MMOL/L — SIGNIFICANT CHANGE UP (ref 5–17)
AST SERPL-CCNC: 17 U/L — SIGNIFICANT CHANGE UP (ref 15–37)
BASOPHILS # BLD AUTO: 0.03 K/UL — SIGNIFICANT CHANGE UP (ref 0–0.2)
BASOPHILS NFR BLD AUTO: 0.4 % — SIGNIFICANT CHANGE UP (ref 0–2)
BILIRUB SERPL-MCNC: 0.3 MG/DL — SIGNIFICANT CHANGE UP (ref 0.2–1.2)
BUN SERPL-MCNC: 47 MG/DL — HIGH (ref 7–23)
CALCIUM SERPL-MCNC: 9.1 MG/DL — SIGNIFICANT CHANGE UP (ref 8.5–10.1)
CHLORIDE SERPL-SCNC: 108 MMOL/L — SIGNIFICANT CHANGE UP (ref 96–108)
CO2 SERPL-SCNC: 26 MMOL/L — SIGNIFICANT CHANGE UP (ref 22–31)
CREAT SERPL-MCNC: 2.5 MG/DL — HIGH (ref 0.5–1.3)
EOSINOPHIL # BLD AUTO: 0.23 K/UL — SIGNIFICANT CHANGE UP (ref 0–0.5)
EOSINOPHIL NFR BLD AUTO: 3.4 % — SIGNIFICANT CHANGE UP (ref 0–6)
GLUCOSE SERPL-MCNC: 108 MG/DL — HIGH (ref 70–99)
HCT VFR BLD CALC: 26.4 % — LOW (ref 39–50)
HGB BLD-MCNC: 8 G/DL — LOW (ref 13–17)
IMM GRANULOCYTES NFR BLD AUTO: 0.3 % — SIGNIFICANT CHANGE UP (ref 0–1.5)
LYMPHOCYTES # BLD AUTO: 1.18 K/UL — SIGNIFICANT CHANGE UP (ref 1–3.3)
LYMPHOCYTES # BLD AUTO: 17.4 % — SIGNIFICANT CHANGE UP (ref 13–44)
MCHC RBC-ENTMCNC: 25.9 PG — LOW (ref 27–34)
MCHC RBC-ENTMCNC: 30.3 GM/DL — LOW (ref 32–36)
MCV RBC AUTO: 85.4 FL — SIGNIFICANT CHANGE UP (ref 80–100)
MONOCYTES # BLD AUTO: 0.45 K/UL — SIGNIFICANT CHANGE UP (ref 0–0.9)
MONOCYTES NFR BLD AUTO: 6.6 % — SIGNIFICANT CHANGE UP (ref 2–14)
NEUTROPHILS # BLD AUTO: 4.89 K/UL — SIGNIFICANT CHANGE UP (ref 1.8–7.4)
NEUTROPHILS NFR BLD AUTO: 71.9 % — SIGNIFICANT CHANGE UP (ref 43–77)
NRBC # BLD: 0 /100 WBCS — SIGNIFICANT CHANGE UP (ref 0–0)
PLATELET # BLD AUTO: 261 K/UL — SIGNIFICANT CHANGE UP (ref 150–400)
POTASSIUM SERPL-MCNC: 3.8 MMOL/L — SIGNIFICANT CHANGE UP (ref 3.5–5.3)
POTASSIUM SERPL-SCNC: 3.8 MMOL/L — SIGNIFICANT CHANGE UP (ref 3.5–5.3)
PROT SERPL-MCNC: 6.8 G/DL — SIGNIFICANT CHANGE UP (ref 6–8.3)
RBC # BLD: 3.09 M/UL — LOW (ref 4.2–5.8)
RBC # FLD: 17.1 % — HIGH (ref 10.3–14.5)
SARS-COV-2 RNA SPEC QL NAA+PROBE: SIGNIFICANT CHANGE UP
SODIUM SERPL-SCNC: 142 MMOL/L — SIGNIFICANT CHANGE UP (ref 135–145)
WBC # BLD: 6.8 K/UL — SIGNIFICANT CHANGE UP (ref 3.8–10.5)
WBC # FLD AUTO: 6.8 K/UL — SIGNIFICANT CHANGE UP (ref 3.8–10.5)

## 2021-10-27 PROCEDURE — 99232 SBSQ HOSP IP/OBS MODERATE 35: CPT

## 2021-10-27 RX ORDER — ACETAMINOPHEN 500 MG
650 TABLET ORAL EVERY 6 HOURS
Refills: 0 | Status: DISCONTINUED | OUTPATIENT
Start: 2021-10-27 | End: 2021-10-29

## 2021-10-27 RX ADMIN — Medication 150 MILLIGRAM(S): at 11:42

## 2021-10-27 RX ADMIN — APIXABAN 5 MILLIGRAM(S): 2.5 TABLET, FILM COATED ORAL at 17:15

## 2021-10-27 RX ADMIN — ARIPIPRAZOLE 30 MILLIGRAM(S): 15 TABLET ORAL at 11:42

## 2021-10-27 RX ADMIN — Medication 81 MILLIGRAM(S): at 11:42

## 2021-10-27 RX ADMIN — Medication 0.2 MILLIGRAM(S): at 17:15

## 2021-10-27 RX ADMIN — Medication 8 MILLIGRAM(S): at 21:38

## 2021-10-27 RX ADMIN — APIXABAN 5 MILLIGRAM(S): 2.5 TABLET, FILM COATED ORAL at 05:16

## 2021-10-27 RX ADMIN — BUDESONIDE AND FORMOTEROL FUMARATE DIHYDRATE 2 PUFF(S): 160; 4.5 AEROSOL RESPIRATORY (INHALATION) at 21:38

## 2021-10-27 RX ADMIN — PREGABALIN 1000 MICROGRAM(S): 225 CAPSULE ORAL at 11:42

## 2021-10-27 RX ADMIN — ISOSORBIDE MONONITRATE 60 MILLIGRAM(S): 60 TABLET, EXTENDED RELEASE ORAL at 11:43

## 2021-10-27 RX ADMIN — Medication 100 MILLIGRAM(S): at 21:38

## 2021-10-27 RX ADMIN — BUDESONIDE AND FORMOTEROL FUMARATE DIHYDRATE 2 PUFF(S): 160; 4.5 AEROSOL RESPIRATORY (INHALATION) at 05:17

## 2021-10-27 RX ADMIN — Medication 1 MILLIGRAM(S): at 11:42

## 2021-10-27 RX ADMIN — Medication 0.2 MILLIGRAM(S): at 05:16

## 2021-10-27 RX ADMIN — AMLODIPINE BESYLATE 10 MILLIGRAM(S): 2.5 TABLET ORAL at 05:17

## 2021-10-27 RX ADMIN — ATORVASTATIN CALCIUM 10 MILLIGRAM(S): 80 TABLET, FILM COATED ORAL at 21:38

## 2021-10-27 RX ADMIN — LAMOTRIGINE 100 MILLIGRAM(S): 25 TABLET, ORALLY DISINTEGRATING ORAL at 11:42

## 2021-10-27 RX ADMIN — Medication 100 MILLIGRAM(S): at 05:16

## 2021-10-27 RX ADMIN — Medication 100 MILLIGRAM(S): at 13:27

## 2021-10-27 RX ADMIN — Medication 60 MILLIGRAM(S): at 17:15

## 2021-10-27 RX ADMIN — MIRTAZAPINE 30 MILLIGRAM(S): 45 TABLET, ORALLY DISINTEGRATING ORAL at 21:39

## 2021-10-27 RX ADMIN — Medication 60 MILLIGRAM(S): at 05:16

## 2021-10-27 RX ADMIN — FAMOTIDINE 20 MILLIGRAM(S): 10 INJECTION INTRAVENOUS at 11:42

## 2021-10-27 NOTE — PROGRESS NOTE ADULT - ATTENDING COMMENTS
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 Cardiology consulted for SOB and chest pain    Pleural effusion  - CT chest showed small B/L pleural effusions and multifocal Pna s/p Abx.  Has been on RA  - ECHO LVEF 55-60%. LV diastolic function cannot determine due to atrial fibrillation. Mild mitral regurgitation is present. Mild aortic stenosis is  present. Mild tricuspid regurgitation is present . No evidence of any pulmonary hypertension  - BNP: 91263 S/P Lasix IV 60mg Q12, now euvolemic and is on PO Lasix 60 mg PO BID   - Creatinine trend:  <--2.30,  <--2.30,  <--2.70,  <--2.40,  <--2.60  - Daily weight,. Monitor Strict I/O's,    Chest pain (Atypical)   - Patient with hx of CAD s/p stents  - EKG showed Afib, no ischemic changes noted  - Mild troponin leak likely in setting of ARIELLA, peaked and trended down  - No clinical evidence of ACS  - Continue with ASA and statin     Afib  - Rate-controlled.  Not on any AVN blocker  - Continue Eliquis  - Monitor electrolytes, replete to keep K>4 and Mag>2

## 2021-10-27 NOTE — PROGRESS NOTE ADULT - SUBJECTIVE AND OBJECTIVE BOX
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 with [facility]. Patient states his SOB started on  and has been worse with laying down. Does admit to intermittent CP associated with the SOB and also when getting imaging as that makes him anxious. Does not appear to have a clear understanding of his medical conditions. Also, states he had some bleeding in his mouth on , but there is no longer bleeding; on exam there is a healing cut on his tongue and inner right cheek with no active bleeding likely from the pt biting his tongue. Admits difficulty laying down straight due to SOB. Denies fevers, chills, abd pain, n/v, sore throat, cough, palpitations  Of note, pt was recently admitted to [facility] on 10/7/21 for hypertensive urgency, Afib and  findings of PNA. was Rxed with IV Zosyn x 7 days  Abx .pt also got 1 u pRBC transfusion past week.    ED Course:   Vitals: BP: 210/96-->181/84, HR: 79, Temp: 98 F, RR: 17, SpO2: 100% on RA-->89% on RA-->98% on 2L NC   Labs: H/H: 9.6/30.6, aPTT: 43, PT/INR: 18.9/1.65, BUN/Cr: 28/2.2, alb: 3.1, GFR: 28, procal: .16, trop: .059, proBNP:10,846  Imaging:   CxR:  Bilateral lung parenchymal airspace opacities, increased from prior. Small pleural effusions suggested  B/l LE Dopplers: No evidence of deep venous thrombosis in either lower extremity.  Bilateral popliteal fossa fluid collections, likely Baker's cysts.  EKG: HR: 74, Atrial fibrillation, Incomplete right bundle branch block, Nonspecific T wave abnormality, Prolonged QT(461)  Received in the ED: Vanco and Zosyn x2, IV lasix 60mg, 10mg Hydralazine IVP x2 (18 Oct 2021 17:24)    INTERVAL HPI:  10/19/21: Patient was seen and examined at bedside. Denies any complaints. States his shortness of breath has improved.   10/20/21: Patient was seen and examined at bedside. States he was not able to sleep well because of another patient screaming across the room. Otherwise denies worsening SOB, chest pain, palpitations.  10/21/21: Patient was seen and examined at bedside. Patient comfortably having breakfast in bed, alert and awake. States he has no further complaints. Stable Labs Low H/H . Cr stable.  10/22/21: No acute events overnight. Patient seen and examined at bedside. Patient denies any fever, chills, chest pain, dyspnea, abdominal pain, leg pain/swelling, dysuria, constipation, diarhea. Needs TB screening. PPD to be placed.  COVID test prior to discharge stabel labs   10/23/21: Patient was seen and examined at bedside. No acute events overnight. Patient complaining of atypical chest pain and palpitations. STAT ekg ordered- unchanged from prior. Ordered cardiac enzymes- will follow up. Ordered Cardizem 2.5mg x 1. No ACS  10/24/21: Patient was seen and examined at bedside. Denied of any acute complaints. States he had SOB overnight and was put on NC but respiratory status improved and patient was put on RA shortly after.   10/25/21: Pt seen, Examined, NO Complaints, pt is stable for d/c , DR Winkler follow up, NEG PPD -0MM induration, COVID PCR -NEG   10/26/21: Patient seen and examined at bedside. No overnight events occurred. Patient complaining of mild R sided abdominal pain that comes and goes. Denies fevers, chills, headache, chest pain, dyspnea, abdominal pain, n/v/d/c. Pt complaining of occasional dysuria - says he has to work harder to push out urine on occasion. OOB to chair.  10/27/21:  Patient seen and examined at bedside. No overnight events occurred. Patient has no complaints at this time. Denies fevers, chills, headache, chest pain, dyspnea, abdominal pain, n/v/d/c. OOB to chair, having BMs,         OVERNIGHT EVENTS: No acute events.    Home Medications:  amLODIPine 10 mg oral tablet: 1 tab(s) orally once a day (25 Oct 2021 14:57)  apixaban 5 mg oral tablet: 1 tab(s) orally every 12 hours (25 Oct 2021 14:57)  ARIPiprazole 30 mg oral tablet: 1 tab(s) orally once a day (25 Oct 2021 14:57)  aspirin 81 mg oral delayed release tablet: 1 tab(s) orally once a day (25 Oct 2021 14:57)  atorvastatin 10 mg oral tablet: 1 tab(s) orally once a day (at bedtime) (25 Oct 2021 14:57)  budesonide-formoterol 160 mcg-4.5 mcg/inh inhalation aerosol:  inhaled  (25 Oct 2021 14:57)  cloNIDine 0.2 mg oral tablet: 1 tab(s) orally every 12 hours (25 Oct 2021 14:57)  cyanocobalamin 1000 mcg oral tablet: 1 tab(s) orally once a day (25 Oct 2021 14:57)  doxazosin 8 mg oral tablet: 1 tab(s) orally once a day (at bedtime) (25 Oct 2021 14:57)  famotidine 20 mg oral tablet: 1 tab(s) orally once a day (25 Oct 2021 14:57)  folic acid 1 mg oral tablet: 1 tab(s) orally once a day (25 Oct 2021 14:57)  furosemide 20 mg oral tablet: 3 tab(s) orally 2 times a day (25 Oct 2021 14:57)  guaiFENesin 600 mg oral tablet, extended release: 1 tab(s) orally every 12 hours, As needed, Cough (27 Oct 2021 09:24)  hydrALAZINE 100 mg oral tablet: 1 tab(s) orally 3 times a day (25 Oct 2021 14:57)  isosorbide mononitrate 60 mg oral tablet, extended release: 1 tab(s) orally once a day (25 Oct 2021 14:57)  lamoTRIgine 100 mg oral tablet: 1 tab(s) orally once a day (25 Oct 2021 14:57)  mirtazapine 30 mg oral tablet: 1 tab(s) orally once a day (at bedtime) (25 Oct 2021 14:57)  venlafaxine 150 mg oral capsule, extended release: 1 cap(s) orally once a day (25 Oct 2021 14:57)      MEDICATIONS  (STANDING):  amLODIPine   Tablet 10 milliGRAM(s) Oral daily  apixaban 5 milliGRAM(s) Oral every 12 hours  ARIPiprazole 30 milliGRAM(s) Oral daily  aspirin enteric coated 81 milliGRAM(s) Oral daily  atorvastatin 10 milliGRAM(s) Oral at bedtime  budesonide 160 MICROgram(s)/formoterol 4.5 MICROgram(s) Inhaler 2 Puff(s) Inhalation two times a day  cloNIDine 0.2 milliGRAM(s) Oral every 12 hours  cyanocobalamin 1000 MICROGram(s) Oral daily  doxazosin 8 milliGRAM(s) Oral at bedtime  famotidine    Tablet 20 milliGRAM(s) Oral daily  folic acid 1 milliGRAM(s) Oral daily  furosemide    Tablet 60 milliGRAM(s) Oral two times a day  hydrALAZINE 100 milliGRAM(s) Oral three times a day  isosorbide   mononitrate ER Tablet (IMDUR) 60 milliGRAM(s) Oral daily  lamoTRIgine 100 milliGRAM(s) Oral daily  mirtazapine 30 milliGRAM(s) Oral at bedtime  venlafaxine XR. 150 milliGRAM(s) Oral daily    MEDICATIONS  (PRN):  guaiFENesin  milliGRAM(s) Oral every 12 hours PRN Cough      Allergies    No Known Allergies    Intolerances        Social History:  Tobacco: quit in , not active smoker  EtOH: denies   Recreational drug use: cocaine several years ago "a few times" has not used in "many years"  Lives with: [facility]; group home; no nursing assistance; observation is there  Ambulates: independent, denies walker or cane but uses guard railings  ADLs: independent, but states need for assistance in bathing, cooking; independent with feeding, ambulating  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[place]  Vaccinations: Moderna x2 doses last dose in May (18 Oct 2021 17:24)      REVIEW OF SYSTEMS:  CONSTITUTIONAL: No fever, No chills, No fatigue, No myalgia, No Body ache, No Weakness  EYES: No eye pain,  No visual disturbances, No discharge, NO Redness  ENMT:  No ear pain, No nose bleed, No vertigo; No sinus pain, NO throat pain, No Congestion  NECK: No pain, No stiffness  RESPIRATORY: No cough, NO wheezing, No  hemoptysis, NO  shortness of breath  CARDIOVASCULAR: No chest pain, palpitations  GASTROINTESTINAL: No abdominal pain, NO epigastric pain. No nausea, No vomiting; No diarrhea, No constipation. [ x ] BM  GENITOURINARY: No dysuria, No frequency, No urgency, No hematuria, NO incontinence  NEUROLOGICAL: No headaches, No dizziness, No numbness, No tingling, No tremors, No weakness  EXT: No Swelling, No Pain, No Edema  SKIN:  [  ] No itching, burning, rashes, or lesions   MUSCULOSKELETAL: No joint pain ,No Jt swelling; No muscle pain, No back pain, No extremity pain  PSYCHIATRIC: No depression,  No anxiety,  No mood swings ,No difficulty sleeping at night  PAIN SCALE: [ x ] None  [  ] Other-  ROS Unable to obtain due to - [  ] Dementia  [  ] Lethargy [  ] Drowsy [  ] Sedated [  ] non verbal  REST OF REVIEW Of SYSTEM - [ x ] Normal     Vital Signs Last 24 Hrs  T(C): 36.4 (27 Oct 2021 05:01), Max: 36.6 (26 Oct 2021 20:06)  T(F): 97.6 (27 Oct 2021 05:01), Max: 97.9 (26 Oct 2021 20:06)  HR: 65 (27 Oct 2021 05:01) (65 - 66)  BP: 177/88 (27 Oct 2021 05:01) (144/85 - 183/87)  BP(mean): --  RR: 17 (27 Oct 2021 05:01) (17 - 18)  SpO2: 94% (27 Oct 2021 05:01) (94% - 96%)  Finger Stick          PHYSICAL EXAM:  GENERAL:  [ x ] NAD , [ x ] well appearing, [  ] Agitated, [  ] Drowsy,  [  ] Lethargy, [  ] confused   HEAD:  [ x ] Normal, [  ] Other  EYES:  [ x ] EOMI, [ x ] PERRLA, [ x ] conjunctiva and sclera clear normal, [  ] Other,  [  ] Pallor,[  ] Discharge  ENMT:  [ x ] Normal, [  ] Moist mucous membranes, [  ] Good dentition, [  ] No Thrush  NECK:  [ x ] Supple, [ x ] No JVD, [  ] Normal thyroid, [  ] Lymphadenopathy [  ] Other  CHEST/LUNG:  [ x ] Clear to auscultation bilaterally, [ x ] Breath Sounds equal B/L, [ x ] poor effort  [ x ] No rales, [ x ] No rhonchi  [ x ]  No wheezing,   HEART:  [ x ] Regular rate and rhythm, [  ] tachycardia, [  ] Bradycardia,  [  ] irregular  [ x ] No murmurs, No rubs, No gallops, [  ] PPM in place (Mfr:  )  ABDOMEN:  [ x ] Soft, [ x ] Nontender, [ x ] Nondistended, [  ] No mass, [  ] Bowel sounds present, [  ] obese  NERVOUS SYSTEM:  [ x ] Alert & Oriented X3, [  ] Nonfocal  [  ] Confusion  [  ] Encephalopathic [  ] Sedated [  ] Unable to assess, [  ] Dementia [  ] Other-  EXTREMITIES: [  ] 2+ Peripheral Pulses, No clubbing, No cyanosis,  [  ] edema B/L lower EXT. [  ] PVD stasis skin changes B/L Lower EXT, [  ] wound  LYMPH: No lymphadenopathy noted  SKIN:  [  ] No rashes or lesions, [  ] Pressure Ulcers, [  ] ecchymosis, [  ] Skin Tears, [ x ] Other - tattoos on ext    DIET: Diet, Dysphagia 3 Soft-Thin Liquids:   Low Sodium (10-19-21 @ 09:33)      LABS:                        8.0    6.80  )-----------( 261      ( 27 Oct 2021 07:25 )             26.4     27 Oct 2021 07:25    142    |  108    |  47     ----------------------------<  108    3.8     |  26     |  2.50     Ca    9.1        27 Oct 2021 07:25    TPro  6.8    /  Alb  2.8    /  TBili  0.3    /  DBili  x      /  AST  17     /  ALT  24     /  AlkPhos  50     27 Oct 2021 07:25      Urinalysis Basic - ( 26 Oct 2021 07:49 )    Color: Pale Yellow / Appearance: Clear / S.010 / pH: x  Gluc: x / Ketone: Negative  / Bili: Negative / Urobili: Negative   Blood: x / Protein: 100 / Nitrite: Negative   Leuk Esterase: Negative / RBC: 0-2 /HPF / WBC 0-2   Sq Epi: x / Non Sq Epi: Negative / Bacteria: x                CARDIAC MARKERS ( 23 Oct 2021 14:13 )  .022 ng/mL / x     / 40 U/L / x     / 1.4 ng/mL  CARDIAC MARKERS ( 23 Oct 2021 09:42 )  .034 ng/mL / x     / 44 U/L / x     / 1.6 ng/mL             Anemia Panel:      Thyroid Panel:                RADIOLOGY & ADDITIONAL TESTS:      HEALTH ISSUES - PROBLEM Dx:  Acute exacerbation of CHF (congestive heart failure)    Hypertensive urgency    HLD (hyperlipidemia)    CAD (coronary artery disease)  s/p stents (not on plavix)    Type 2 diabetes mellitus  not on home insulin    Atrial fibrillation  first documented on EKG 10/7/2021    Depression with anxiety    Pneumonia    DVT prophylaxis    Stage 3 chronic kidney disease    Anemia    Elevated troponin            Consultant(s) Notes Reviewed:  [  ] YES     Care Discussed with [X] Consultants  [  ] Patient  [  ] Family [  ] HCP [  ]   [  ] Social Service  [  ] RN, [  ] Physical Therapy,[  ] Palliative care team  DVT PPX: [  ] Lovenox, [  ] S C Heparin, [  ] Coumadin, [  ] Xarelto, [  ] Eliquis, [  ] Pradaxa, [  ] IV Heparin drip, [  ] SCD [  ] Contraindication 2 to GI Bleed,[  ] Ambulation [  ] Contraindicated 2 to  bleed [  ] Contraindicated 2 to Brain Bleed  Advanced directive: [  ] None, [  ] DNR/DNI Patient is a 76y old  Male who presents with a chief complaint of Hypertensive urgency  (21 Oct 2021 13:26)    HPI:  75 yo M with PMHx of Type 2 DM (not on insulin), BPH, CAD s/p stents >4 years ago (not on plavix), diverticulitis (hospitalized 2020 at [facility]), GIB 2/2 diverticulosis (), anxiety, Lupus, HTN, HLD, CKD stage 3, HepC, 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 Patient states his SOB started on  and has been worse with laying down. Does admit to intermittent CP associated with the SOB and also when getting imaging as that makes him anxious. Does not appear to have a clear understanding of his medical conditions. Also, states he had some bleeding in his mouth on , but there is no longer bleeding; on exam there is a healing cut on his tongue and inner right cheek with no active bleeding likely from the pt biting his tongue. Admits difficulty laying down straight due to SOB. Denies fevers, chills, abd pain, n/v, sore throat, cough, palpitations  Of note, pt was recently admitted to [facility] on 10/7/21 for hypertensive urgency, Afib and  findings of PNA. was Rxed with IV Zosyn x 7 days  Abx .pt also got 1 u pRBC transfusion past week.    ED Course:   Vitals: BP: 210/96-->181/84, HR: 79, Temp: 98 F, RR: 17, SpO2: 100% on RA-->89% on RA-->98% on 2L NC   Labs: H/H: 9.6/30.6, aPTT: 43, PT/INR: 18.9/1.65, BUN/Cr: 28/2.2, alb: 3.1, GFR: 28, procal: .16, trop: .059, proBNP:10,846  Imaging:   CxR:  Bilateral lung parenchymal airspace opacities, increased from prior. Small pleural effusions suggested  B/l LE Dopplers: No evidence of deep venous thrombosis in either lower extremity.  Bilateral popliteal fossa fluid collections, likely Baker's cysts.  EKG: HR: 74, Atrial fibrillation, Incomplete right bundle branch block, Nonspecific T wave abnormality, Prolonged QT(461)  Received in the ED: Vanco and Zosyn x2, IV lasix 60mg, 10mg Hydralazine IVP x2 (18 Oct 2021 17:24)    INTERVAL HPI:  10/19/21: Patient was seen and examined at bedside. Denies any complaints. States his shortness of breath has improved.   10/20/21: Patient was seen and examined at bedside. States he was not able to sleep well because of another patient screaming across the room. Otherwise denies worsening SOB, chest pain, palpitations.  10/21/21: Patient was seen and examined at bedside. Patient comfortably having breakfast in bed, alert and awake. States he has no further complaints. Stable Labs Low H/H . Cr stable.  10/22/21: No acute events overnight. Patient seen and examined at bedside. Patient denies any fever, chills, chest pain, dyspnea, abdominal pain, leg pain/swelling, dysuria, constipation, diarhea. Needs TB screening. PPD to be placed.  COVID test prior to discharge stabel labs   10/23/21: Patient was seen and examined at bedside. No acute events overnight. Patient complaining of atypical chest pain and palpitations. STAT ekg ordered- unchanged from prior. Ordered cardiac enzymes- will follow up. Ordered Cardizem 2.5mg x 1. No ACS  10/24/21: Patient was seen and examined at bedside. Denied of any acute complaints. States he had SOB overnight and was put on NC but respiratory status improved and patient was put on RA shortly after.   10/25/21: Pt seen, Examined, NO Complaints, pt is stable for d/c , DR Winkler follow up, NEG PPD -0MM induration, COVID PCR -NEG   10/26/21: Patient seen and examined at bedside. No overnight events occurred. Patient complaining of mild R sided abdominal pain that comes and goes. Denies fevers, chills, headache, chest pain, dyspnea, abdominal pain, n/v/d/c. Pt complaining of occasional dysuria - says he has to work harder to push out urine on occasion. OOB to chair.  10/27/21:  Patient seen and examined at bedside. No overnight events occurred. Patient has no complaints at this time. Denies fevers, chills, headache, chest pain, dyspnea, abdominal pain, n/v/d/c. OOB to chair, having BMs, No Complaints.      OVERNIGHT EVENTS: No acute events.    Home Medications:  amLODIPine 10 mg oral tablet: 1 tab(s) orally once a day (25 Oct 2021 14:57)  apixaban 5 mg oral tablet: 1 tab(s) orally every 12 hours (25 Oct 2021 14:57)  ARIPiprazole 30 mg oral tablet: 1 tab(s) orally once a day (25 Oct 2021 14:57)  aspirin 81 mg oral delayed release tablet: 1 tab(s) orally once a day (25 Oct 2021 14:57)  atorvastatin 10 mg oral tablet: 1 tab(s) orally once a day (at bedtime) (25 Oct 2021 14:57)  budesonide-formoterol 160 mcg-4.5 mcg/inh inhalation aerosol:  inhaled  (25 Oct 2021 14:57)  cloNIDine 0.2 mg oral tablet: 1 tab(s) orally every 12 hours (25 Oct 2021 14:57)  cyanocobalamin 1000 mcg oral tablet: 1 tab(s) orally once a day (25 Oct 2021 14:57)  doxazosin 8 mg oral tablet: 1 tab(s) orally once a day (at bedtime) (25 Oct 2021 14:57)  famotidine 20 mg oral tablet: 1 tab(s) orally once a day (25 Oct 2021 14:57)  folic acid 1 mg oral tablet: 1 tab(s) orally once a day (25 Oct 2021 14:57)  furosemide 20 mg oral tablet: 3 tab(s) orally 2 times a day (25 Oct 2021 14:57)  guaiFENesin 600 mg oral tablet, extended release: 1 tab(s) orally every 12 hours, As needed, Cough (27 Oct 2021 09:24)  hydrALAZINE 100 mg oral tablet: 1 tab(s) orally 3 times a day (25 Oct 2021 14:57)  isosorbide mononitrate 60 mg oral tablet, extended release: 1 tab(s) orally once a day (25 Oct 2021 14:57)  lamoTRIgine 100 mg oral tablet: 1 tab(s) orally once a day (25 Oct 2021 14:57)  mirtazapine 30 mg oral tablet: 1 tab(s) orally once a day (at bedtime) (25 Oct 2021 14:57)  venlafaxine 150 mg oral capsule, extended release: 1 cap(s) orally once a day (25 Oct 2021 14:57)      MEDICATIONS  (STANDING):  amLODIPine   Tablet 10 milliGRAM(s) Oral daily  apixaban 5 milliGRAM(s) Oral every 12 hours  ARIPiprazole 30 milliGRAM(s) Oral daily  aspirin enteric coated 81 milliGRAM(s) Oral daily  atorvastatin 10 milliGRAM(s) Oral at bedtime  budesonide 160 MICROgram(s)/formoterol 4.5 MICROgram(s) Inhaler 2 Puff(s) Inhalation two times a day  cloNIDine 0.2 milliGRAM(s) Oral every 12 hours  cyanocobalamin 1000 MICROGram(s) Oral daily  doxazosin 8 milliGRAM(s) Oral at bedtime  famotidine    Tablet 20 milliGRAM(s) Oral daily  folic acid 1 milliGRAM(s) Oral daily  furosemide    Tablet 60 milliGRAM(s) Oral two times a day  hydrALAZINE 100 milliGRAM(s) Oral three times a day  isosorbide   mononitrate ER Tablet (IMDUR) 60 milliGRAM(s) Oral daily  lamoTRIgine 100 milliGRAM(s) Oral daily  mirtazapine 30 milliGRAM(s) Oral at bedtime  venlafaxine XR. 150 milliGRAM(s) Oral daily    MEDICATIONS  (PRN):  guaiFENesin  milliGRAM(s) Oral every 12 hours PRN Cough      Allergies    No Known Allergies    Intolerances        Social History:  Tobacco: quit in , not active smoker  EtOH: denies   Recreational drug use: cocaine several years ago "a few times" has not used in "many years"  Lives with: [facility]; group home; no nursing assistance; observation is there  Ambulates: independent, denies walker or cane but uses guard railings  ADLs: independent, but states need for assistance in bathing, cooking; independent with feeding, ambulating  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 No nausea, No vomiting; No diarrhea, No constipation. [ x ] BM  GENITOURINARY: No dysuria, No frequency, No urgency, No hematuria, NO incontinence  NEUROLOGICAL: No headaches, No dizziness, No numbness, No tingling, No tremors, No weakness  EXT: No Swelling, No Pain, No Edema  SKIN:  [  ] No itching, burning, rashes, or lesions   MUSCULOSKELETAL: No joint pain ,No Jt swelling; No muscle pain, No back pain, No extremity pain  PSYCHIATRIC: No depression,  No anxiety,  No mood swings ,No difficulty sleeping at night  PAIN SCALE: [ x ] None  [  ] Other-  ROS Unable to obtain due to - [  ] Dementia  [  ] Lethargy [  ] Drowsy [  ] Sedated [  ] non verbal  REST OF REVIEW Of SYSTEM - [ x ] Normal     Vital Signs Last 24 Hrs  T(C): 36.4 (27 Oct 2021 05:01), Max: 36.6 (26 Oct 2021 20:06)  T(F): 97.6 (27 Oct 2021 05:01), Max: 97.9 (26 Oct 2021 20:06)  HR: 65 (27 Oct 2021 05:01) (65 - 66)  BP: 177/88 (27 Oct 2021 05:01) (144/85 - 183/87)  BP(mean): --  RR: 17 (27 Oct 2021 05:01) (17 - 18)  SpO2: 94% (27 Oct 2021 05:01) (94% - 96%)  Finger Stick          PHYSICAL EXAM:  GENERAL:  [ x ] NAD , [ x ] well appearing, [  ] Agitated, [  ] Drowsy,  [  ] Lethargy, [  ] confused   HEAD:  [ x ] Normal, [  ] Other  EYES:  [ x ] EOMI, [ x ] PERRLA, [ x ] conjunctiva and sclera clear normal, [  ] Other,  [  ] Pallor,[  ] Discharge  ENMT:  [ x ] Normal, [  ] Moist mucous membranes, [  ] Good dentition, [  ] No Thrush  NECK:  [ x ] Supple, [ x ] No JVD, [  ] Normal thyroid, [  ] Lymphadenopathy [  ] Other  CHEST/LUNG:  [ x ] Clear to auscultation bilaterally, [ x ] Breath Sounds equal B/L, [ x ] poor effort  [ x ] No rales, [ x ] No rhonchi  [ x ]  No wheezing,   HEART:  [ x ] Regular rate and rhythm, [  ] tachycardia, [  ] Bradycardia,  [  ] irregular  [ x ] No murmurs, No rubs, No gallops, [  ] PPM in place (Mfr:  )  ABDOMEN:  [ x ] Soft, [ x ] Nontender, [ x ] Nondistended, [  ] No mass, [  ] Bowel sounds present, [  ] obese  NERVOUS SYSTEM:  [ x ] Alert & Oriented X3, [  ] Nonfocal  [  ] Confusion  [  ] Encephalopathic [  ] Sedated [  ] Unable to assess, [  ] Dementia [  ] Other-  EXTREMITIES: [  ] 2+ Peripheral Pulses, No clubbing, No cyanosis,  [  ] edema B/L lower EXT. [  ] PVD stasis skin changes B/L Lower EXT, [  ] wound  LYMPH: No lymphadenopathy noted  SKIN:  [  ] No rashes or lesions, [  ] Pressure Ulcers, [  ] ecchymosis, [  ] Skin Tears, [ x ] Other - tattoos on ext    DIET: Diet, Dysphagia 3 Soft-Thin Liquids:   Low Sodium (10-19-21 @ 09:33)      LABS:                        8.0    6.80  )-----------( 261      ( 27 Oct 2021 07:25 )             26.4     27 Oct 2021 07:25    142    |  108    |  47     ----------------------------<  108    3.8     |  26     |  2.50     Ca    9.1        27 Oct 2021 07:25    TPro  6.8    /  Alb  2.8    /  TBili  0.3    /  DBili  x      /  AST  17     /  ALT  24     /  AlkPhos  50     27 Oct 2021 07:25      Urinalysis Basic - ( 26 Oct 2021 07:49 )    Color: Pale Yellow / Appearance: Clear / S.010 / pH: x  Gluc: x / Ketone: Negative  / Bili: Negative / Urobili: Negative   Blood: x / Protein: 100 / Nitrite: Negative   Leuk Esterase: Negative / RBC: 0-2 /HPF / WBC 0-2   Sq Epi: x / Non Sq Epi: Negative / Bacteria: x        CARDIAC MARKERS ( 23 Oct 2021 14:13 )  .022 ng/mL / x     / 40 U/L / x     / 1.4 ng/mL  CARDIAC MARKERS ( 23 Oct 2021 09:42 )  .034 ng/mL / x     / 44 U/L / x     / 1.6 ng/mL      RADIOLOGY & ADDITIONAL TESTS: none      HEALTH ISSUES - PROBLEM Dx:  Acute exacerbation of CHF (congestive heart failure)    Hypertensive urgency    HLD (hyperlipidemia)    CAD (coronary artery disease)  s/p stents (not on plavix)    Type 2 diabetes mellitus  not on home insulin    Atrial fibrillation  first documented on EKG 10/7/2021    Depression with anxiety    Pneumonia    DVT prophylaxis    Stage 3 chronic kidney disease    Anemia    Elevated troponin      Consultant(s) Notes Reviewed:  [x  ] YES     Care Discussed with [X] Consultants  [ x ] Patient  [  ] Family [  ] HCP [  ]   [ x ] Social Service  [x  ] RN, [ x ] Physical Therapy,[  ] Palliative care team  DVT PPX: [  ] Lovenox, [  ] S C Heparin, [  ] Coumadin, [  ] Xarelto, [ x ] Eliquis, [  ] Pradaxa, [  ] IV Heparin drip, [  ] SCD [  ] Contraindication 2 to GI Bleed,[  ] Ambulation [  ] Contraindicated 2 to  bleed [  ] Contraindicated 2 to Brain Bleed  Advanced directive: [x  ] None, [  ] DNR/DNI

## 2021-10-27 NOTE — PROGRESS NOTE ADULT - PROBLEM SELECTOR PLAN 3
- Pt was recent admitted to [facility] from 10/7-10/16 and treated for PNA with IV Zosyn & Augmentin x 7 days   - Although CxR shows bilateral lung parenchymal airspace opacities, increased from prior pt does not have leukocytosis or fever and it is suspected SOB is more so 2/2 CHF exacerbation. CxR findings are suspected to be from previously treated PNA  - s/p vanc and zosyn in ED. will hold off on further Abx at this time.as per Pulmonary Dr Day   - F/u Cultures: pending results, Nl Lactate   - official S&S: dysphagis 3, soft solids   - Pulm, Dr. Day,- Keep Off Abx , glorialey Old PNA   Continue Symbicort 2x day.

## 2021-10-27 NOTE — PROGRESS NOTE ADULT - PROBLEM SELECTOR PLAN 2
- Pt presented with /96 --- unclear if pt is compliant with his medications as he does not appear to have a good understanding of his medical conditions --- Psych, Dr. Winkler, consulted for assessment of capacity- ok to discharge from psych standpoint   - No vision changes, headache, ARIELLA, current CP, ECHO done.  - C/w with home antihypertensives:   - Lasix 60 mg 2x day  -  Continue clonidine 0.2  2x day  - On hydralazine 100 mg q 8 hrs   - continue amlodipine 10 mg qd  -Cardura 8 mg daily  - Increase   Imdur 60 mg Qd   -- STOP nifedipine XL  - Cardio, Dr. Bliss's group, following.

## 2021-10-27 NOTE — PROGRESS NOTE ADULT - PROBLEM SELECTOR PLAN 12
- AC with Eliquis.    Discharge planning:  - f/u COVID  - dispo, f/u KIERA. medication adherence concerns

## 2021-10-27 NOTE — PROGRESS NOTE ADULT - PROBLEM SELECTOR PLAN 1
- Pt presented with SOB worse with laying down and with LE edema Since Sunday  - CxR: Bilateral lung parenchymal airspace opacities, increased from prior. Small pleural effusions suggested  - B/l LE Dopplers: No evidence of deep venous thrombosis in either lower extremity.  - BNP: [id number]  - TTE on 10/13/21(previous admission): LVEF 55-60%. LV diastolic function cannot determine due to atrial fibrillation. -- Do not need to repeat at time AC on Chronic Diastolic CHF   - supplemental O2 as needed   - s/p 60mg IVP-stop  - cont Lasix 60mg PO BID  - am CT chest:   1. Small bilateral pleural effusions.  2. Multifocal bilateral pneumonia, organism nonspecific, does not have the characteristic appearance of COVID-19 but is within range of what can be seen with COVID-19 pneumonia.  - Monitor fluid status closely  - Cardio, Dr. Bliss's group, following.  - Pulm following- recommends monitoring off antibiotics at this time.

## 2021-10-27 NOTE — PROGRESS NOTE ADULT - SUBJECTIVE AND OBJECTIVE BOX
Date/Time Patient Seen:  		  Referring MD:   Data Reviewed	       Patient is a 76y old  Male who presents with a chief complaint of Hypertensive urgency  (21 Oct 2021 13:26)      Subjective/HPI     PAST MEDICAL & SURGICAL HISTORY:  Hypertension    Diabetes mellitus    Lupus    Hepatitis C    Anxiety and depression    CAD (coronary artery disease)  s/p stents    Diverticulosis    Hyperlipidemia    HTN (hypertension)  c/b multiple episodes of hypertensive urgency    HLD (hyperlipidemia)    Atrial fibrillation  first documented on EKG 10/7/2021    CAD (coronary artery disease)  s/p stents (not on plavix)    Type 2 diabetes mellitus  not on home insulin/ Meds    Anxiety  Depression  multiple psych medications    History of diverticulitis  07/2021    Diverticulosis  c/b GIB in 2020    Afib  on AC    Stage 3 chronic kidney disease    Anemia of chronic disease  Monoclonal Gammopathy-MGUS  pRBC transfusion 10/15/21    Chronic diastolic congestive heart failure    Multiple thyroid nodules    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  Had surgery ( April 2013 )    S/P tonsillectomy    S/P arthroscopic knee surgery  Bilateral ( 2005 )    Torsion of testicle  Had surgery at age 14    Pilonidal cyst  Had surgery ( 1969 )    S/P cataract surgery  Bilateral    H/O hernia repair          Medication list         MEDICATIONS  (STANDING):  amLODIPine   Tablet 10 milliGRAM(s) Oral daily  apixaban 5 milliGRAM(s) Oral every 12 hours  ARIPiprazole 30 milliGRAM(s) Oral daily  aspirin enteric coated 81 milliGRAM(s) Oral daily  atorvastatin 10 milliGRAM(s) Oral at bedtime  budesonide 160 MICROgram(s)/formoterol 4.5 MICROgram(s) Inhaler 2 Puff(s) Inhalation two times a day  cloNIDine 0.2 milliGRAM(s) Oral every 12 hours  cyanocobalamin 1000 MICROGram(s) Oral daily  doxazosin 8 milliGRAM(s) Oral at bedtime  famotidine    Tablet 20 milliGRAM(s) Oral daily  folic acid 1 milliGRAM(s) Oral daily  furosemide    Tablet 60 milliGRAM(s) Oral two times a day  hydrALAZINE 100 milliGRAM(s) Oral three times a day  isosorbide   mononitrate ER Tablet (IMDUR) 60 milliGRAM(s) Oral daily  lamoTRIgine 100 milliGRAM(s) Oral daily  mirtazapine 30 milliGRAM(s) Oral at bedtime  venlafaxine XR. 150 milliGRAM(s) Oral daily    MEDICATIONS  (PRN):  guaiFENesin  milliGRAM(s) Oral every 12 hours PRN Cough         Vitals log        ICU Vital Signs Last 24 Hrs  T(C): 36.4 (27 Oct 2021 05:01), Max: 36.6 (26 Oct 2021 20:06)  T(F): 97.6 (27 Oct 2021 05:01), Max: 97.9 (26 Oct 2021 20:06)  HR: 65 (27 Oct 2021 05:01) (65 - 66)  BP: 177/88 (27 Oct 2021 05:01) (144/85 - 183/87)  BP(mean): --  ABP: --  ABP(mean): --  RR: 17 (27 Oct 2021 05:01) (17 - 18)  SpO2: 94% (27 Oct 2021 05:01) (94% - 96%)           Input and Output:  I&O's Detail    25 Oct 2021 07:01  -  26 Oct 2021 07:00  --------------------------------------------------------  IN:  Total IN: 0 mL    OUT:    Voided (mL): 1400 mL  Total OUT: 1400 mL    Total NET: -1400 mL          Lab Data                        8.3    6.18  )-----------( 263      ( 26 Oct 2021 08:34 )             26.9     10-26    142  |  108  |  44<H>  ----------------------------<  108<H>  3.3<L>   |  26  |  2.30<H>    Ca    9.0      26 Oct 2021 08:34    TPro  6.7  /  Alb  2.8<L>  /  TBili  0.3  /  DBili  x   /  AST  15  /  ALT  22  /  AlkPhos  49  10-26            Review of Systems	      Objective     Physical Examination    heart s1s2  lung dec BS  abd soft      Pertinent Lab findings & Imaging      Nikko:  NO   Adequate UO     I&O's Detail    25 Oct 2021 07:01  -  26 Oct 2021 07:00  --------------------------------------------------------  IN:  Total IN: 0 mL    OUT:    Voided (mL): 1400 mL  Total OUT: 1400 mL    Total NET: -1400 mL               Discussed with:     Cultures:	        Radiology

## 2021-10-27 NOTE — PROGRESS NOTE ADULT - ASSESSMENT
CKD 4  Diabetes  Dyspnea, Fluid overload  Hypertension  h/o SLE  Hypokalemia  Anemia    Renal indices at baseline. On PO lasix. Retacrit for anemia. Monitor blood sugar levels. Insulin coverage as needed.   Monitor h/h trend. Potassium supplementation. Monitor BP trend. Titrate BP meds as needed. Salt restriction.   Will follow electrolytes and renal function trend. Avoid nephrotoxic meds as possible.

## 2021-10-27 NOTE — PROGRESS NOTE ADULT - ASSESSMENT
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) living [facility]    Mucinex added for cough rx regimen  clonidine and imdur added for BP control  on lasix  vs noted  PPD read neg   dc planning - cm notes reviewed -     completed ABX for PNA  ct chest from recent admission reviewed, cxr noted, repeat CT chest reviewed - multifocal pna reported - however - clinically does not correlate -   would monitor off ABX - biomarkers - cx - monitor VS and Sat  assess - eval for HF - vol overload - Diuresis - I and O - cardio eval - TTE reviewed  ELMER - does not tolerate CPAP  CKD - monitor renal indices - i and o - replete lytes  dysphagia diet - asp prec - HOB elev - oral hygiene  assist with needs - ADL  emotional support  tele monitor  old records reviewed

## 2021-10-27 NOTE — PROGRESS NOTE ADULT - SUBJECTIVE AND OBJECTIVE BOX
[facility] -- Fifi Bliss, Kalpesh Valdovinos, Abraham Sheridan Savella, Goodger: Office # [phone]    Follow Up:   SOB, Chest pain, Hx of Afib    Subjective/Observations: Patient seen and examined. Patient awake, alert, OOB to chair. No complaints of chest pain, dyspnea, palpitations or dizziness. No signs of orthopnea or PND. Tolerating room air.     REVIEW OF SYSTEMS: All review of systems is negative for eye, ENT, GI, , allergic, dermatologic, musculoskeletal and neurologic except as described above    PAST MEDICAL & SURGICAL HISTORY:  HTN (hypertension)  c/b multiple episodes of hypertensive urgency    HLD (hyperlipidemia)    Atrial fibrillation  first documented on EKG 10/7/2021    CAD (coronary artery disease)  s/p stents (not on plavix)    Type 2 diabetes mellitus  not on home insulin/ Meds    Anxiety  Depression  multiple psych medications    History of diverticulitis  07/2021    Diverticulosis  c/b GIB in 2020    Afib  on AC    Stage 3 chronic kidney disease    Anemia of chronic disease  Monoclonal Gammopathy-MGUS  pRBC transfusion 10/15/21    Chronic diastolic congestive heart failure    Multiple thyroid nodules    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  Had surgery ( April 2013 )    S/P tonsillectomy    S/P arthroscopic knee surgery  Bilateral ( 2005 )    Torsion of testicle  Had surgery at age 14    Pilonidal cyst  Had surgery ( 1969 )    S/P cataract surgery  Bilateral    H/O hernia repair    MEDICATIONS  (STANDING):  amLODIPine   Tablet 10 milliGRAM(s) Oral daily  apixaban 5 milliGRAM(s) Oral every 12 hours  ARIPiprazole 30 milliGRAM(s) Oral daily  aspirin enteric coated 81 milliGRAM(s) Oral daily  atorvastatin 10 milliGRAM(s) Oral at bedtime  budesonide 160 MICROgram(s)/formoterol 4.5 MICROgram(s) Inhaler 2 Puff(s) Inhalation two times a day  cloNIDine 0.2 milliGRAM(s) Oral every 12 hours  cyanocobalamin 1000 MICROGram(s) Oral daily  doxazosin 8 milliGRAM(s) Oral at bedtime  famotidine    Tablet 20 milliGRAM(s) Oral daily  folic acid 1 milliGRAM(s) Oral daily  furosemide    Tablet 60 milliGRAM(s) Oral two times a day  hydrALAZINE 100 milliGRAM(s) Oral three times a day  isosorbide   mononitrate ER Tablet (IMDUR) 60 milliGRAM(s) Oral daily  lamoTRIgine 100 milliGRAM(s) Oral daily  mirtazapine 30 milliGRAM(s) Oral at bedtime  venlafaxine XR. 150 milliGRAM(s) Oral daily    MEDICATIONS  (PRN):  guaiFENesin  milliGRAM(s) Oral every 12 hours PRN Cough    Allergies  No Known Allergies    Vital Signs Last 24 Hrs  T(C): 36.4 (27 Oct 2021 05:01), Max: 36.6 (26 Oct 2021 20:06)  T(F): 97.6 (27 Oct 2021 05:01), Max: 97.9 (26 Oct 2021 20:06)  HR: 65 (27 Oct 2021 05:01) (65 - 66)  BP: 177/88 (27 Oct 2021 05:01) (144/85 - 183/87)  BP(mean): --  RR: 17 (27 Oct 2021 05:01) (17 - 18)  SpO2: 94% (27 Oct 2021 05:01) (94% - 96%)  I&O's Summary        TELE: Not on telemetry   PHYSICAL EXAM:  Appearance: NAD, no distress, alert, Well developed   HEENT: Moist Mucous Membranes, Anicteric  Cardiovascular: Regular rate and rhythm, Normal S1 S2, No JVD, No murmurs, No rubs, gallops or clicks  Respiratory: Non-labored, Clear to auscultation, No rales, No rhonchi, No wheezing.   Gastrointestinal:  Soft, Non-tender, + BS  Neurologic: Non-focal  Skin: Warm and dry, No visible rashes or ulcers, No ecchymosis, No cyanosis  Musculoskeletal: No clubbing, No cyanosis, No joint swelling/tenderness  Psychiatry: Mood & affect appropriate  Lymph: No peripheral edema.     LABS: All Labs Reviewed:                        8.3    6.18  )-----------( 263      ( 26 Oct 2021 08:34 )             26.9                         9.4    7.57  )-----------( 278      ( 25 Oct 2021 07:42 )             31.3                         8.8    7.20  )-----------( 257      ( 24 Oct 2021 10:04 )             29.6     26 Oct 2021 08:34    142    |  108    |  44     ----------------------------<  108    3.3     |  26     |  2.30   25 Oct 2021 07:42    142    |  108    |  41     ----------------------------<  112    3.5     |  26     |  2.30   24 Oct 2021 10:04    143    |  109    |  41     ----------------------------<  221    3.5     |  23     |  2.70     Ca    9.0        26 Oct 2021 08:34  Ca    9.7        25 Oct 2021 07:42  Ca    8.9        24 Oct 2021 10:04    TPro  6.7    /  Alb  2.8    /  TBili  0.3    /  DBili  x      /  AST  15     /  ALT  22     /  AlkPhos  49     26 Oct 2021 08:34  TPro  7.3    /  Alb  2.9    /  TBili  0.3    /  DBili  x      /  AST  17     /  ALT  22     /  AlkPhos  50     25 Oct 2021 07:42  TPro  7.2    /  Alb  2.7    /  TBili  0.2    /  DBili  x      /  AST  11     /  ALT  23     /  AlkPhos  50     24 Oct 2021 10:04    Creatine Kinase, Serum: 40 U/L (10-23-21 @ 14:13)  Troponin I, Serum: .022 ng/mL (10-23-21 @ 14:13)  Creatine Kinase, Serum: 44 U/L (10-23-21 @ 09:42)  Troponin I HST: Cholesterol, Serum: 174 mg/dL (10-07-21 @ 11:54)  HDL Cholesterol, Serum: 42 mg/dL (10-07-21 @ 11:54)  Triglycerides, Serum: 160 mg/dL (10-07-21 @ 11:54)    12 Lead ECG:   Ventricular Rate 87 BPM    Atrial Rate 166 BPM    QRS Duration 110 ms    Q-T Interval 392 ms    QTC Calculation(Bazett) 471 ms    R Axis 4 degrees    T Axis 15 degrees    Diagnosis Line Atrial fibrillation  Nonspecific ST and T wave abnormality  Prolonged QT  Abnormal ECG  Confirmed by lynne Monterroso ([id number]) on 10/23/2021 3:39:52 PM (10-23-21 @ 08:43)     EXAM:  ECHO TTE WO CON COMP W DOPP    PROCEDURE DATE:  10/13/2021      INTERPRETATION:  Ordering Physician: SKYLA TERRY [id number]  Indication: Cardiac arrhythmias.  Technician: INDIGO  Study Quality: Technical difficult study.  A complete echocardiographic study was performed utilizing standard protocol including spectral and color Doppler in all echocardiographic windows.  Height: 172cm  Weight: 102kg  BSA: 2.1m2  Blood Pressure: 150/90  MEASUREMENTS  IVS: 1.3cm  PWT: 1.3cm  LA: 4.2cm  AO: 3.5cm  LVIDd: 5.4 cm.  LVIDs: 3.5cm  LVEF: 55-60%.  PASP: 34mm Hg  FINDINGS  Left Ventricle: Normal in  size and normal LV systolic function with estimated LVEF 55-60%.  Right Ventricle: Normal in size and normal RV systolic function.  Left Atrium: Mild dilated.  Right Atrium: Normal in size.  Mitral Valve: Mild mitral annular calcification is present. Mitral valve is mildly calcified and no evidence of any mitral stenosis. Mild mitral regurgitation is present.  Aortic Valve: Aortic root is mildsclerotic and of normal dimension. Aortic valve is mildly calcified and mild  aortic stenosis is present with peak gradient across aortic valve is 30 mmHg. Aortic valve area not calculated.  Tricuspid Valve: Mild tricuspid regurgitation with calculated PA systolic pressure 34 mmHg is present. No evidence of any pulmonary hypertension  Pulmonic Valve: Trace Pulmonary regurgitation is present.  Diastolic Function: LV diastolic function cannot determine due to  underlying atrial fibrillation.  Pericardium/Pleura: No evidence of any pericardial effusion.  No intracardiac mass  thrombus or vegetations and  tumor.  Mild concentric left ventricular hypertrophy is present.    IMPRESSION:  Normal LV size with normal LV systolic function with estimated LVEF 55-60%.  LV diastolic function cannot determine due to atrial fibrillation.  Mild mitral regurgitation is present.  Mild aortic stenosis is  present.  Mild tricuspid regurgitation is present . No evidence of any pulmonary hypertension  Correlate clinically above findings.    --- End of Report ---    SKYLA TERRY MD; Attending Cardiologist  This document has been electronically signed. Oct 13 2021 11:55PM    EXAM:  US DPLX LWR EXT VEINS COMPL BI                        PROCEDURE DATE:  10/18/2021    INTERPRETATION:  CLINICAL INFORMATION: Lower extremity edema  COMPARISON: 07/20/2021.    TECHNIQUE: Duplex sonography of the BILATERAL LOWER extremity veins with color and spectral Doppler, with and without compression.    FINDINGS:  RIGHT:  Normal compressibility of the RIGHT common femoral, femoral and popliteal veins.  Doppler examination shows normal spontaneous and phasic flow.  No RIGHT calf vein thrombosis is detected.  Right popliteal fluid collection measures 4.2 x 3.3 x 1.3 cm.    LEFT:  Normal compressibility of the LEFT common femoral, femoral and popliteal veins.  Doppler examination shows normal spontaneous and phasic flow.  No LEFT calf vein thrombosis is detected.  Left popliteal fossa fluid collection measuring 4.9 x 2.8 x 1.3 cm.    IMPRESSION:  No evidence of deep venous thrombosis in either lower extremity.  Bilateral popliteal fossa fluid collections, likely Baker's cysts.  --- End of Report ---  ROMMEL GALEANO MD; Attending Radiologist  This document has been electronically signed. Oct 18 2021  2:21PM

## 2021-10-27 NOTE — PROGRESS NOTE ADULT - ASSESSMENT
75 yo M with PMHx of Type 2 DM (not on insulin), BPH, CAD s/p stents >4 years ago (not on plavix),  Chronic Diastolic CHF ,Anemia -MGUS ,Thyroid Nodule  diverticulitis (hospitalized 07/[facility] GIB 2/2 diverticulosis (2020), anxiety/ depression , Lupus, HTN, HLD, CKD stage 3, HepC, hx of blood clots in brain (s/p surgery 2013) living in a group home [facility] house admitted for CHF exacerbation and HTN urgency.

## 2021-10-27 NOTE — PROGRESS NOTE ADULT - ASSESSMENT
75 yo M with PMH of Type 2 DM (not on insulin), BPH, CAD s/p stents >4 years ago (not on Plavix), diverticulitis (hospitalized 07/2020 at [facility]), GIB 2/2 diverticulosis (2020), anxiety, Lupus, HTN, HLD, CKD stage 3, HepC, 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 to [facility] today with shortness of breath, chest pain that started upon discharge 2 days ago with complaints of oral bleed that started today. Cardiology consulted for SOB and chest pain    Pleural effusion  - CT chest showed small B/L pleural effusions and multifocal Pna s/p Abx.  Has been on RA  - ECHO LVEF 55-60%. LV diastolic function cannot determine due to atrial fibrillation. Mild mitral regurgitation is present. Mild aortic stenosis is  present. Mild tricuspid regurgitation is present . No evidence of any pulmonary hypertension  - BNP: 86534 S/P Lasix IV 60mg Q12, now euvolemic and is on PO Lasix 60 mg PO BID   - Creatinine trend:  <--2.30,  <--2.30,  <--2.70,  <--2.40,  <--2.60  - Daily weight,. Monitor Strict I/O's,    Chest pain (Atypical)   - Patient with hx of CAD s/p stents  - EKG showed Afib, no ischemic changes noted  - Mild troponin leak likely in setting of ARIELLA, peaked and trended down  - No clinical evidence of ACS  - Continue with ASA and statin     Afib  - Rate-controlled.  Not on any AVN blocker  - Continue Eliquis  - Monitor electrolytes, replete to keep K>4 and Mag>2    Hypertensive Urgency   - Presented with /90 s/p Hydralazine in ED 10 mg IV X2 doses  - BP improved  - BP: 177/88 (10-27-21 @ 05:01) (144/85 - 183/87)  - Continue Hydralazine 100 mg q8H  - Continue Norvasc 10 mg   - Continue Imdur 60 and Clonidine 0.2.   - Would increase Imdur to 60 q12.     - Monitor and replete lytes, keep K>4, Mg>2.  - All other medical needs as per primary team.  - Other cardiovascular workup will depend on clinical course.  - Will continue to follow.    MS NOEMY GuardadoP, [facility]  Nurse Practitioner- Cardiology   Spectra #[phone]

## 2021-10-27 NOTE — PROGRESS NOTE ADULT - ATTENDING COMMENTS
77 yo M with PMHx of Type 2 DM (not on insulin), BPH, CAD s/p stents >4 years ago (not on plavix),  Chronic Diastolic CHF ,Anemia -MGUS , diverticulitis (hospitalized 07/2020 at [facility]"), GIB 2/2 diverticulosis (2020), anxiety/ depression , Lupus, HTN, HLD, CKD stage 3, HepC, hx of blood clots in brain (s/p surgery 2013) living in a group home [facility] presenting to the ED with SOB. Pt was already Rxed for PNA with IV Zosyn last week,  Admitted for ac on Chronic Diastolic  CHF exacerbation and HTN urgency.  Pt seen, Examined, case & care plan d/w pt, residents at detail.  -Concern for NON Compliance with meds [facility].  Pulmonary-Dr Day follow up  Cardio-DR Rosario follow up -Stable BP  Psych-DR Winkler d/w at detail, patient appears to have limited insight into medication , No capacity   PT Eval.---> NO PT need  Social service Eval. d/w at detail, start D/C Plan .   PPD  placed-Rt Forearm- 0 mm Induration -NEG , plan for COVID 19 PCR NEG   Total care time is 45 minutes. , pt is stable for d/c .  D/W Social service at detail.

## 2021-10-27 NOTE — PROGRESS NOTE ADULT - SUBJECTIVE AND OBJECTIVE BOX
Patient is a 76y old  Male who presents with a chief complaint of shortness of breath (19 Oct 2021 09:10)  Patient seen in follow up for CKD 4, fluid overload.        PAST MEDICAL HISTORY:  Hypertension    Diabetes mellitus    Lupus    Hepatitis C    Anxiety and depression    CAD (coronary artery disease)    Diverticulosis    Hyperlipidemia    HTN (hypertension)    HLD (hyperlipidemia)    Atrial fibrillation    CAD (coronary artery disease)    Type 2 diabetes mellitus    Anxiety    History of diverticulitis    Diverticulosis    Afib    Stage 3 chronic kidney disease    Anemia of chronic disease    Chronic diastolic congestive heart failure    Multiple thyroid nodules      MEDICATIONS  (STANDING):  amLODIPine   Tablet 10 milliGRAM(s) Oral daily  apixaban 5 milliGRAM(s) Oral every 12 hours  ARIPiprazole 30 milliGRAM(s) Oral daily  aspirin enteric coated 81 milliGRAM(s) Oral daily  atorvastatin 10 milliGRAM(s) Oral at bedtime  budesonide 160 MICROgram(s)/formoterol 4.5 MICROgram(s) Inhaler 2 Puff(s) Inhalation two times a day  cloNIDine 0.2 milliGRAM(s) Oral every 12 hours  cyanocobalamin 1000 MICROGram(s) Oral daily  doxazosin 8 milliGRAM(s) Oral at bedtime  famotidine    Tablet 20 milliGRAM(s) Oral daily  folic acid 1 milliGRAM(s) Oral daily  furosemide    Tablet 60 milliGRAM(s) Oral two times a day  hydrALAZINE 100 milliGRAM(s) Oral three times a day  isosorbide   mononitrate ER Tablet (IMDUR) 60 milliGRAM(s) Oral daily  lamoTRIgine 100 milliGRAM(s) Oral daily  mirtazapine 30 milliGRAM(s) Oral at bedtime  venlafaxine XR. 150 milliGRAM(s) Oral daily    MEDICATIONS  (PRN):  guaiFENesin  milliGRAM(s) Oral every 12 hours PRN Cough    T(C): 36.4 (10-27-21 @ 11:56), Max: 36.9 (10-25-21 @ 19:47)  HR: 67 (10-27-21 @ 11:56) (62 - 67)  BP: 165/82 (10-27-21 @ 11:56) (144/85 - 183/87)  RR: 18 (10-27-21 @ 11:56)  SpO2: 96% (10-27-21 @ 11:56)  Wt(kg): --  I&O's Detail                PHYSICAL EXAM:  General: no distress  Respiratory: b/l air entry  Cardiovascular: S1 S2  Gastrointestinal: soft  Extremities: + edema             LABORATORY:                        8.0    6.80  )-----------( 261      ( 27 Oct 2021 07:25 )             26.4     10-27    142  |  108  |  47<H>  ----------------------------<  108<H>  3.8   |  26  |  2.50<H>    Ca    9.1      27 Oct 2021 07:25    TPro  6.8  /  Alb  2.8<L>  /  TBili  0.3  /  DBili  x   /  AST  17  /  ALT  24  /  AlkPhos  50  10-27    Sodium, Serum: 142 mmol/L (10-27 @ 07:25)  Sodium, Serum: 142 mmol/L (10-26 @ 08:34)    Potassium, Serum: 3.8 mmol/L (10-27 @ 07:25)  Potassium, Serum: 3.3 mmol/L (10-26 @ 08:34)    Hemoglobin: 8.0 g/dL (10-27 @ 07:25)  Hemoglobin: 8.3 g/dL (10-26 @ 08:34)  Hemoglobin: 9.4 g/dL (10-25 @ 07:42)    Creatinine, Serum 2.50 (10-27 @ 07:25)  Creatinine, Serum 2.30 (10-26 @ 08:34)  Creatinine, Serum 2.30 (10-25 @ 07:42)        LIVER FUNCTIONS - ( 27 Oct 2021 07:25 )  Alb: 2.8 g/dL / Pro: 6.8 g/dL / ALK PHOS: 50 U/L / ALT: 24 U/L / AST: 17 U/L / GGT: x           Urinalysis Basic - ( 26 Oct 2021 07:49 )    Color: Pale Yellow / Appearance: Clear / S.010 / pH: x  Gluc: x / Ketone: Negative  / Bili: Negative / Urobili: Negative   Blood: x / Protein: 100 / Nitrite: Negative   Leuk Esterase: Negative / RBC: 0-2 /HPF / WBC 0-2   Sq Epi: x / Non Sq Epi: Negative / Bacteria: x

## 2021-10-28 PROCEDURE — 99232 SBSQ HOSP IP/OBS MODERATE 35: CPT

## 2021-10-28 RX ORDER — AMLODIPINE BESYLATE 2.5 MG/1
1 TABLET ORAL
Qty: 30 | Refills: 0
Start: 2021-10-28 | End: 2021-11-26

## 2021-10-28 RX ORDER — FAMOTIDINE 10 MG/ML
1 INJECTION INTRAVENOUS
Qty: 30 | Refills: 0
Start: 2021-10-28 | End: 2021-11-26

## 2021-10-28 RX ORDER — DOXAZOSIN MESYLATE 4 MG
1 TABLET ORAL
Qty: 30 | Refills: 0
Start: 2021-10-28 | End: 2021-11-26

## 2021-10-28 RX ORDER — FOLIC ACID 0.8 MG
1 TABLET ORAL
Qty: 30 | Refills: 0
Start: 2021-10-28 | End: 2021-11-26

## 2021-10-28 RX ORDER — APIXABAN 2.5 MG/1
1 TABLET, FILM COATED ORAL
Qty: 60 | Refills: 0
Start: 2021-10-28 | End: 2021-11-26

## 2021-10-28 RX ORDER — ATORVASTATIN CALCIUM 80 MG/1
1 TABLET, FILM COATED ORAL
Qty: 30 | Refills: 0
Start: 2021-10-28 | End: 2021-11-26

## 2021-10-28 RX ORDER — MIRTAZAPINE 45 MG/1
1 TABLET, ORALLY DISINTEGRATING ORAL
Qty: 30 | Refills: 0
Start: 2021-10-28 | End: 2021-11-26

## 2021-10-28 RX ORDER — ASPIRIN/CALCIUM CARB/MAGNESIUM 324 MG
1 TABLET ORAL
Qty: 30 | Refills: 0
Start: 2021-10-28 | End: 2021-11-26

## 2021-10-28 RX ORDER — SENNA PLUS 8.6 MG/1
2 TABLET ORAL
Qty: 60 | Refills: 0
Start: 2021-10-28 | End: 2021-11-26

## 2021-10-28 RX ORDER — ARIPIPRAZOLE 15 MG/1
1 TABLET ORAL
Qty: 30 | Refills: 0
Start: 2021-10-28 | End: 2021-11-26

## 2021-10-28 RX ORDER — ISOSORBIDE MONONITRATE 60 MG/1
1 TABLET, EXTENDED RELEASE ORAL
Qty: 30 | Refills: 0
Start: 2021-10-28 | End: 2021-11-26

## 2021-10-28 RX ORDER — FUROSEMIDE 40 MG
3 TABLET ORAL
Qty: 180 | Refills: 0
Start: 2021-10-28 | End: 2021-11-26

## 2021-10-28 RX ORDER — PREGABALIN 225 MG/1
1 CAPSULE ORAL
Qty: 30 | Refills: 0
Start: 2021-10-28 | End: 2021-11-26

## 2021-10-28 RX ORDER — POLYETHYLENE GLYCOL 3350 17 G/17G
17 POWDER, FOR SOLUTION ORAL DAILY
Refills: 0 | Status: DISCONTINUED | OUTPATIENT
Start: 2021-10-28 | End: 2021-10-29

## 2021-10-28 RX ORDER — FUROSEMIDE 40 MG
1 TABLET ORAL
Qty: 0 | Refills: 0 | DISCHARGE
Start: 2021-10-28 | End: 2021-11-26

## 2021-10-28 RX ORDER — POTASSIUM CHLORIDE 20 MEQ
1 PACKET (EA) ORAL
Qty: 30 | Refills: 0
Start: 2021-10-28 | End: 2021-11-26

## 2021-10-28 RX ORDER — HYDRALAZINE HCL 50 MG
1 TABLET ORAL
Qty: 90 | Refills: 0
Start: 2021-10-28 | End: 2021-11-26

## 2021-10-28 RX ORDER — LAMOTRIGINE 25 MG/1
1 TABLET, ORALLY DISINTEGRATING ORAL
Qty: 30 | Refills: 0
Start: 2021-10-28 | End: 2021-11-26

## 2021-10-28 RX ORDER — BUDESONIDE AND FORMOTEROL FUMARATE DIHYDRATE 160; 4.5 UG/1; UG/1
2 AEROSOL RESPIRATORY (INHALATION)
Qty: 1 | Refills: 0
Start: 2021-10-28 | End: 2021-11-26

## 2021-10-28 RX ORDER — VENLAFAXINE HCL 75 MG
1 CAPSULE, EXT RELEASE 24 HR ORAL
Qty: 30 | Refills: 0
Start: 2021-10-28 | End: 2021-11-26

## 2021-10-28 RX ADMIN — ARIPIPRAZOLE 30 MILLIGRAM(S): 15 TABLET ORAL at 11:09

## 2021-10-28 RX ADMIN — Medication 60 MILLIGRAM(S): at 17:10

## 2021-10-28 RX ADMIN — Medication 0.2 MILLIGRAM(S): at 05:13

## 2021-10-28 RX ADMIN — Medication 150 MILLIGRAM(S): at 11:09

## 2021-10-28 RX ADMIN — Medication 650 MILLIGRAM(S): at 04:23

## 2021-10-28 RX ADMIN — MIRTAZAPINE 30 MILLIGRAM(S): 45 TABLET, ORALLY DISINTEGRATING ORAL at 21:34

## 2021-10-28 RX ADMIN — APIXABAN 5 MILLIGRAM(S): 2.5 TABLET, FILM COATED ORAL at 05:13

## 2021-10-28 RX ADMIN — LAMOTRIGINE 100 MILLIGRAM(S): 25 TABLET, ORALLY DISINTEGRATING ORAL at 11:09

## 2021-10-28 RX ADMIN — Medication 81 MILLIGRAM(S): at 11:09

## 2021-10-28 RX ADMIN — APIXABAN 5 MILLIGRAM(S): 2.5 TABLET, FILM COATED ORAL at 17:10

## 2021-10-28 RX ADMIN — Medication 100 MILLIGRAM(S): at 13:24

## 2021-10-28 RX ADMIN — ISOSORBIDE MONONITRATE 60 MILLIGRAM(S): 60 TABLET, EXTENDED RELEASE ORAL at 11:09

## 2021-10-28 RX ADMIN — AMLODIPINE BESYLATE 10 MILLIGRAM(S): 2.5 TABLET ORAL at 05:13

## 2021-10-28 RX ADMIN — Medication 1 MILLIGRAM(S): at 11:09

## 2021-10-28 RX ADMIN — Medication 8 MILLIGRAM(S): at 21:34

## 2021-10-28 RX ADMIN — Medication 60 MILLIGRAM(S): at 05:13

## 2021-10-28 RX ADMIN — POLYETHYLENE GLYCOL 3350 17 GRAM(S): 17 POWDER, FOR SOLUTION ORAL at 13:24

## 2021-10-28 RX ADMIN — FAMOTIDINE 20 MILLIGRAM(S): 10 INJECTION INTRAVENOUS at 11:09

## 2021-10-28 RX ADMIN — PREGABALIN 1000 MICROGRAM(S): 225 CAPSULE ORAL at 11:09

## 2021-10-28 RX ADMIN — BUDESONIDE AND FORMOTEROL FUMARATE DIHYDRATE 2 PUFF(S): 160; 4.5 AEROSOL RESPIRATORY (INHALATION) at 05:15

## 2021-10-28 RX ADMIN — Medication 100 MILLIGRAM(S): at 05:13

## 2021-10-28 RX ADMIN — BUDESONIDE AND FORMOTEROL FUMARATE DIHYDRATE 2 PUFF(S): 160; 4.5 AEROSOL RESPIRATORY (INHALATION) at 17:10

## 2021-10-28 RX ADMIN — Medication 0.2 MILLIGRAM(S): at 17:10

## 2021-10-28 RX ADMIN — ATORVASTATIN CALCIUM 10 MILLIGRAM(S): 80 TABLET, FILM COATED ORAL at 21:34

## 2021-10-28 RX ADMIN — Medication 650 MILLIGRAM(S): at 02:48

## 2021-10-28 RX ADMIN — Medication 100 MILLIGRAM(S): at 21:34

## 2021-10-28 NOTE — PROGRESS NOTE ADULT - PROBLEM SELECTOR PLAN 1
- Pt presented with SOB worse with laying down and with LE edema Since Sunday  - CxR: Bilateral lung parenchymal airspace opacities, increased from prior. Small pleural effusions suggested  - B/l LE Dopplers: No evidence of deep venous thrombosis in either lower extremity.  - BNP: 57039  - TTE on 10/13/21(previous admission): LVEF 55-60%. LV diastolic function cannot determine due to atrial fibrillation. -- Do not need to repeat at time AC on Chronic Diastolic CHF   - supplemental O2 as needed   - s/p 60mg IVP-stop  - cont Lasix 60mg PO BID  - am CT chest:   1. Small bilateral pleural effusions.  2. Multifocal bilateral pneumonia, organism nonspecific, does not have the characteristic appearance of COVID-19 but is within range of what can be seen with COVID-19 pneumonia.  - Monitor fluid status closely  - Cardio, Dr. Bliss's group, following.  - Pulm following- recommends monitoring off antibiotics at this time.

## 2021-10-28 NOTE — PROGRESS NOTE ADULT - SUBJECTIVE AND OBJECTIVE BOX
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 with SOB. Patient states his SOB started on Sunday and has been worse with laying down. Does admit to intermittent CP associated with the SOB and also when getting imaging as that makes him anxious. Does not appear to have a clear understanding of his medical conditions. Also, states he had some bleeding in his mouth on Sunday, but there is no longer bleeding; on exam there is a healing cut on his tongue and inner right cheek with no active bleeding likely from the pt biting his tongue. Admits difficulty laying down straight due to SOB. Denies fevers, chills, abd pain, n/v, sore throat, cough, palpitations  Of note, pt was recently admitted to [facility] on 10/7/21 for hypertensive urgency, Afib and  findings of PNA. was Rxed with IV Zosyn x 7 days  Abx .pt also got 1 u pRBC transfusion past week.    ED Course:   Vitals: BP: 210/96-->181/84, HR: 79, Temp: 98 F, RR: 17, SpO2: 100% on RA-->89% on RA-->98% on 2L NC   Labs: H/H: 9.6/30.6, aPTT: 43, PT/INR: 18.9/1.65, BUN/Cr: 28/2.2, alb: 3.1, GFR: 28, procal: .16, trop: .059, proBNP:10,846  Imaging:   CxR:  Bilateral lung parenchymal airspace opacities, increased from prior. Small pleural effusions suggested  B/l LE Dopplers: No evidence of deep venous thrombosis in either lower extremity.  Bilateral popliteal fossa fluid collections, likely Baker's cysts.  EKG: HR: 74, Atrial fibrillation, Incomplete right bundle branch block, Nonspecific T wave abnormality, Prolonged QT(461)  Received in the ED: Vanco and Zosyn x2, IV lasix 60mg, 10mg Hydralazine IVP x2 (18 Oct 2021 17:24)    INTERVAL HPI:  10/19/21: Patient was seen and examined at bedside. Denies any complaints. States his shortness of breath has improved.   10/20/21: Patient was seen and examined at bedside. States he was not able to sleep well because of another patient screaming across the room. Otherwise denies worsening SOB, chest pain, palpitations.  10/21/21: Patient was seen and examined at bedside. Patient comfortably having breakfast in bed, alert and awake. States he has no further complaints. Stable Labs Low H/H . Cr stable.  10/22/21: No acute events overnight. Patient seen and examined at bedside. Patient denies any fever, chills, chest pain, dyspnea, abdominal pain, leg pain/swelling, dysuria, constipation, diarhea. Needs TB screening. PPD to be placed.  COVID test prior to discharge stabel labs   10/23/21: Patient was seen and examined at bedside. No acute events overnight. Patient complaining of atypical chest pain and palpitations. STAT ekg ordered- unchanged from prior. Ordered cardiac enzymes- will follow up. Ordered Cardizem 2.5mg x 1. No ACS  10/24/21: Patient was seen and examined at bedside. Denied of any acute complaints. States he had SOB overnight and was put on NC but respiratory status improved and patient was put on RA shortly after.   10/25/21: Pt seen, Examined, NO Complaints, pt is stable for d/c , DR Winkler follow up, NEG PPD -0MM induration, COVID PCR -NEG   10/26/21: Patient seen and examined at bedside. No overnight events occurred. Patient complaining of mild R sided abdominal pain that comes and goes. Denies fevers, chills, headache, chest pain, dyspnea, abdominal pain, n/v/d/c. Pt complaining of occasional dysuria - says he has to work harder to push out urine on occasion. OOB to chair.  10/27/21:  Patient seen and examined at bedside. No overnight events occurred. Patient has no complaints at this time. Denies fevers, chills, headache, chest pain, dyspnea, abdominal pain, n/v/d/c. OOB to chair, having BMs, No Complaints.  10/28/21: Patient seen and examined at bedside. Pt had elevated BPs overnight that responded to AM BP meds. Patient has no complaints at this time. He says he does not want to be examined because "breakfast is coming soon." Denies fevers, headache, chest pain, dyspnea, abdominal pain, n/v/d/c. Reports feeling cold this morning      OVERNIGHT EVENTS: Elevated BPs that responded to AM meds.    Home Medications:  amLODIPine 10 mg oral tablet: 1 tab(s) orally once a day (25 Oct 2021 14:57)  apixaban 5 mg oral tablet: 1 tab(s) orally every 12 hours (25 Oct 2021 14:57)  ARIPiprazole 30 mg oral tablet: 1 tab(s) orally once a day (25 Oct 2021 14:57)  aspirin 81 mg oral delayed release tablet: 1 tab(s) orally once a day (25 Oct 2021 14:57)  atorvastatin 10 mg oral tablet: 1 tab(s) orally once a day (at bedtime) (25 Oct 2021 14:57)  budesonide-formoterol 160 mcg-4.5 mcg/inh inhalation aerosol:  inhaled  (25 Oct 2021 14:57)  cloNIDine 0.2 mg oral tablet: 1 tab(s) orally every 12 hours (25 Oct 2021 14:57)  cyanocobalamin 1000 mcg oral tablet: 1 tab(s) orally once a day (25 Oct 2021 14:57)  doxazosin 8 mg oral tablet: 1 tab(s) orally once a day (at bedtime) (25 Oct 2021 14:57)  famotidine 20 mg oral tablet: 1 tab(s) orally once a day (25 Oct 2021 14:57)  folic acid 1 mg oral tablet: 1 tab(s) orally once a day (25 Oct 2021 14:57)  furosemide 20 mg oral tablet: 3 tab(s) orally 2 times a day (25 Oct 2021 14:57)  guaiFENesin 600 mg oral tablet, extended release: 1 tab(s) orally every 12 hours, As needed, Cough (27 Oct 2021 09:24)  hydrALAZINE 100 mg oral tablet: 1 tab(s) orally 3 times a day (25 Oct 2021 14:57)  isosorbide mononitrate 60 mg oral tablet, extended release: 1 tab(s) orally once a day (25 Oct 2021 14:57)  lamoTRIgine 100 mg oral tablet: 1 tab(s) orally once a day (25 Oct 2021 14:57)  mirtazapine 30 mg oral tablet: 1 tab(s) orally once a day (at bedtime) (25 Oct 2021 14:57)  venlafaxine 150 mg oral capsule, extended release: 1 cap(s) orally once a day (25 Oct 2021 14:57)      MEDICATIONS  (STANDING):  amLODIPine   Tablet 10 milliGRAM(s) Oral daily  apixaban 5 milliGRAM(s) Oral every 12 hours  ARIPiprazole 30 milliGRAM(s) Oral daily  aspirin enteric coated 81 milliGRAM(s) Oral daily  atorvastatin 10 milliGRAM(s) Oral at bedtime  budesonide 160 MICROgram(s)/formoterol 4.5 MICROgram(s) Inhaler 2 Puff(s) Inhalation two times a day  cloNIDine 0.2 milliGRAM(s) Oral every 12 hours  cyanocobalamin 1000 MICROGram(s) Oral daily  doxazosin 8 milliGRAM(s) Oral at bedtime  famotidine    Tablet 20 milliGRAM(s) Oral daily  folic acid 1 milliGRAM(s) Oral daily  furosemide    Tablet 60 milliGRAM(s) Oral two times a day  hydrALAZINE 100 milliGRAM(s) Oral three times a day  isosorbide   mononitrate ER Tablet (IMDUR) 60 milliGRAM(s) Oral daily  lamoTRIgine 100 milliGRAM(s) Oral daily  mirtazapine 30 milliGRAM(s) Oral at bedtime  venlafaxine XR. 150 milliGRAM(s) Oral daily    MEDICATIONS  (PRN):  acetaminophen     Tablet .. 650 milliGRAM(s) Oral every 6 hours PRN Mild Pain (1 - 3)  guaiFENesin  milliGRAM(s) Oral every 12 hours PRN Cough      Allergies    No Known Allergies    Intolerances        Social History:  Tobacco: quit in 1980, not active smoker  EtOH: denies   Recreational drug use: cocaine several years ago "a few times" has not used in "many years"  Lives with: [facility]; group home; no nursing assistance; observation is there  Ambulates: independent, denies walker or cane but uses guard railings  ADLs: independent, but states need for assistance in bathing, cooking; independent with feeding, ambulating  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[place]  Vaccinations: Moderna x2 doses last dose in May (18 Oct 2021 17:24)      REVIEW OF SYSTEMS:  CONSTITUTIONAL: No fever, [ x ] chills, No fatigue, No myalgia, No Body ache, No Weakness  EYES: No eye pain,  No visual disturbances, No discharge, NO Redness  ENMT:  No ear pain, No nose bleed, No vertigo; No sinus pain, NO throat pain, No Congestion  NECK: No pain, No stiffness  RESPIRATORY: No cough, NO wheezing, No  hemoptysis, NO  shortness of breath  CARDIOVASCULAR: No chest pain, palpitations  GASTROINTESTINAL: No abdominal pain, NO epigastric pain. No nausea, No vomiting; No diarrhea, No constipation. [ x ] BM  GENITOURINARY: No dysuria, No frequency, No urgency, No hematuria, NO incontinence  NEUROLOGICAL: No headaches, No dizziness, No numbness, No tingling, No tremors, No weakness  EXT: No Swelling, No Pain, No Edema  SKIN:  [  ] No itching, burning, rashes, or lesions   MUSCULOSKELETAL: No joint pain ,No Jt swelling; No muscle pain, No back pain, No extremity pain  PSYCHIATRIC: No depression,  No anxiety,  No mood swings ,No difficulty sleeping at night  PAIN SCALE: [ x ] None  [  ] Other-  ROS Unable to obtain due to - [  ] Dementia  [  ] Lethargy [  ] Drowsy [  ] Sedated [  ] non verbal  REST OF REVIEW Of SYSTEM - [ x ] Normal     Vital Signs Last 24 Hrs  T(C): 36.5 (28 Oct 2021 04:41), Max: 36.7 (27 Oct 2021 20:07)  T(F): 97.7 (28 Oct 2021 04:41), Max: 98 (27 Oct 2021 20:07)  HR: 64 (28 Oct 2021 04:41) (64 - 67)  BP: 154/71 (28 Oct 2021 06:43) (154/71 - 191/88)  BP(mean): --  RR: 19 (28 Oct 2021 04:41) (17 - 19)  SpO2: 95% (28 Oct 2021 04:41) (92% - 96%)  Finger Stick        10-27 @ 07:01  -  10-28 @ 07:00  --------------------------------------------------------  IN: 0 mL / OUT: 300 mL / NET: -300 mL    10-28 @ 07:01  -  10-28 @ 10:48  --------------------------------------------------------  IN: 200 mL / OUT: 0 mL / NET: 200 mL        PHYSICAL EXAM: pt REFUSING FULL PHYSICAL EXAM  GENERAL:  [ x ] NAD , [ x ] well appearing, [  ] Agitated, [  ] Drowsy,  [  ] Lethargy, [  ] confused   HEAD:  [ x ] Normal, [  ] Other  EYES:  [ x ] EOMI, [  ] PERRLA, [  ] conjunctiva and sclera clear normal, [  ] Other,  [  ] Pallor,[  ] Discharge  ENMT:  [  ] Normal, [  ] Moist mucous membranes, [  ] Good dentition, [  ] No Thrush  NECK:  [  ] Supple, [  ] No JVD, [  ] Normal thyroid, [  ] Lymphadenopathy [  ] Other  CHEST/LUNG:  [  ] Clear to auscultation bilaterally, [  ] Breath Sounds equal B/L / Decrease, [  ] poor effort  [  ] No rales, [  ] No rhonchi  [  ]  No wheezing,   HEART:  [  ] Regular rate and rhythm, [  ] tachycardia, [  ] Bradycardia,  [  ] irregular  [  ] No murmurs, No rubs, No gallops, [  ] PPM in place (Mfr:  )  ABDOMEN:  [  ] Soft, [  ] Nontender, [  ] Nondistended, [  ] No mass, [  ] Bowel sounds present, [  ] obese  NERVOUS SYSTEM:  [ x ] Alert & Oriented X3, [  ] Nonfocal  [  ] Confusion  [  ] Encephalopathic [  ] Sedated [  ] Unable to assess, [  ] Dementia [  ] Other-  EXTREMITIES: [  ] 2+ Peripheral Pulses, No clubbing, No cyanosis,  [  ] edema B/L lower EXT. [  ] PVD stasis skin changes B/L Lower EXT, [  ] wound  LYMPH: No lymphadenopathy noted  SKIN:  [  ] No rashes or lesions, [  ] Pressure Ulcers, [  ] ecchymosis, [  ] Skin Tears, [  ] Other    DIET: Diet, Dysphagia 3 Soft-Thin Liquids:   Consistent Carbohydrate No Snacks  Low Sodium (10-24-21 @ 11:36)      LABS:      Ca    9.1        27 Oct 2021 07:25                    CARDIAC MARKERS ( 23 Oct 2021 14:13 )  .022 ng/mL / x     / 40 U/L / x     / 1.4 ng/mL  CARDIAC MARKERS ( 23 Oct 2021 09:42 )  .034 ng/mL / x     / 44 U/L / x     / 1.6 ng/mL             Anemia Panel:      Thyroid Panel:                RADIOLOGY & ADDITIONAL TESTS:      HEALTH ISSUES - PROBLEM Dx:  Acute exacerbation of CHF (congestive heart failure)    Hypertensive urgency    HLD (hyperlipidemia)    CAD (coronary artery disease)  s/p stents (not on plavix)    Type 2 diabetes mellitus  not on home insulin    Atrial fibrillation  first documented on EKG 10/7/2021    Depression with anxiety    Pneumonia    DVT prophylaxis    Stage 3 chronic kidney disease    Anemia    Elevated troponin            Consultant(s) Notes Reviewed:  [  ] YES     Care Discussed with [X] Consultants  [  ] Patient  [  ] Family [  ] HCP [  ]   [  ] Social Service  [  ] RN, [  ] Physical Therapy,[  ] Palliative care team  DVT PPX: [  ] Lovenox, [  ] S C Heparin, [  ] Coumadin, [  ] Xarelto, [  ] Eliquis, [  ] Pradaxa, [  ] IV Heparin drip, [  ] SCD [  ] Contraindication 2 to GI Bleed,[  ] Ambulation [  ] Contraindicated 2 to  bleed [  ] Contraindicated 2 to Brain Bleed  Advanced directive: [  ] None, [  ] DNR/DNI Patient is a 76y old  Male who presents with a chief complaint of hypertensive urgency (27 Oct 2021 09:26)    HPI:  77 yo M with PMHx of Type 2 DM (not on insulin), BPH, CAD s/p stents >4 years ago (not on plavix), diverticulitis (hospitalized 07/2020 at [facility]), GIB 2/2 diverticulosis (2020), anxiety, Lupus, HTN, HLD, CKD stage 3, HepC, 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 Patient states his SOB started on Sunday and has been worse with laying down. Does admit to intermittent CP associated with the SOB and also when getting imaging as that makes him anxious. Does not appear to have a clear understanding of his medical conditions. Also, states he had some bleeding in his mouth on Sunday, but there is no longer bleeding; on exam there is a healing cut on his tongue and inner right cheek with no active bleeding likely from the pt biting his tongue. Admits difficulty laying down straight due to SOB. Denies fevers, chills, abd pain, n/v, sore throat, cough, palpitations  Of note, pt was recently admitted to [facility] on 10/7/21 for hypertensive urgency, Afib and  findings of PNA. was Rxed with IV Zosyn x 7 days  Abx .pt also got 1 u pRBC transfusion past week.    ED Course:   Vitals: BP: 210/96-->181/84, HR: 79, Temp: 98 F, RR: 17, SpO2: 100% on RA-->89% on RA-->98% on 2L NC   Labs: H/H: 9.6/30.6, aPTT: 43, PT/INR: 18.9/1.65, BUN/Cr: 28/2.2, alb: 3.1, GFR: 28, procal: .16, trop: .059, proBNP:10,846  Imaging:   CxR:  Bilateral lung parenchymal airspace opacities, increased from prior. Small pleural effusions suggested  B/l LE Dopplers: No evidence of deep venous thrombosis in either lower extremity.  Bilateral popliteal fossa fluid collections, likely Baker's cysts.  EKG: HR: 74, Atrial fibrillation, Incomplete right bundle branch block, Nonspecific T wave abnormality, Prolonged QT(461)  Received in the ED: Vanco and Zosyn x2, IV lasix 60mg, 10mg Hydralazine IVP x2 (18 Oct 2021 17:24)    INTERVAL HPI:  10/19/21: Patient was seen and examined at bedside. Denies any complaints. States his shortness of breath has improved.   10/20/21: Patient was seen and examined at bedside. States he was not able to sleep well because of another patient screaming across the room. Otherwise denies worsening SOB, chest pain, palpitations.  10/21/21: Patient was seen and examined at bedside. Patient comfortably having breakfast in bed, alert and awake. States he has no further complaints. Stable Labs Low H/H . Cr stable.  10/22/21: No acute events overnight. Patient seen and examined at bedside. Patient denies any fever, chills, chest pain, dyspnea, abdominal pain, leg pain/swelling, dysuria, constipation, diarhea. Needs TB screening. PPD to be placed.  COVID test prior to discharge stabel labs   10/23/21: Patient was seen and examined at bedside. No acute events overnight. Patient complaining of atypical chest pain and palpitations. STAT ekg ordered- unchanged from prior. Ordered cardiac enzymes- will follow up. Ordered Cardizem 2.5mg x 1. No ACS  10/24/21: Patient was seen and examined at bedside. Denied of any acute complaints. States he had SOB overnight and was put on NC but respiratory status improved and patient was put on RA shortly after.   10/25/21: Pt seen, Examined, NO Complaints, pt is stable for d/c , DR Winkler follow up, NEG PPD -0MM induration, COVID PCR -NEG   10/26/21: Patient seen and examined at bedside. No overnight events occurred. Patient complaining of mild R sided abdominal pain that comes and goes. Denies fevers, chills, headache, chest pain, dyspnea, abdominal pain, n/v/d/c. Pt complaining of occasional dysuria - says he has to work harder to push out urine on occasion. OOB to chair.  10/27/21:  Patient seen and examined at bedside. No overnight events occurred. Patient has no complaints at this time. Denies fevers, chills, headache, chest pain, dyspnea, abdominal pain, n/v/d/c. OOB to chair, having BMs, No Complaints.  10/28/21: Patient seen and examined at bedside. Pt had elevated BPs overnight that responded to AM BP meds. Patient has no complaints at this time. He says he does not want to be examined because "breakfast is coming soon." Denies fevers, headache, chest pain, dyspnea, abdominal pain, n/v/d/c. Reports feeling cold this morning      OVERNIGHT EVENTS: Elevated BPs that responded to AM meds.    Home Medications:  amLODIPine 10 mg oral tablet: 1 tab(s) orally once a day (25 Oct 2021 14:57)  apixaban 5 mg oral tablet: 1 tab(s) orally every 12 hours (25 Oct 2021 14:57)  ARIPiprazole 30 mg oral tablet: 1 tab(s) orally once a day (25 Oct 2021 14:57)  aspirin 81 mg oral delayed release tablet: 1 tab(s) orally once a day (25 Oct 2021 14:57)  atorvastatin 10 mg oral tablet: 1 tab(s) orally once a day (at bedtime) (25 Oct 2021 14:57)  budesonide-formoterol 160 mcg-4.5 mcg/inh inhalation aerosol:  inhaled  (25 Oct 2021 14:57)  cloNIDine 0.2 mg oral tablet: 1 tab(s) orally every 12 hours (25 Oct 2021 14:57)  cyanocobalamin 1000 mcg oral tablet: 1 tab(s) orally once a day (25 Oct 2021 14:57)  doxazosin 8 mg oral tablet: 1 tab(s) orally once a day (at bedtime) (25 Oct 2021 14:57)  famotidine 20 mg oral tablet: 1 tab(s) orally once a day (25 Oct 2021 14:57)  folic acid 1 mg oral tablet: 1 tab(s) orally once a day (25 Oct 2021 14:57)  furosemide 20 mg oral tablet: 3 tab(s) orally 2 times a day (25 Oct 2021 14:57)  guaiFENesin 600 mg oral tablet, extended release: 1 tab(s) orally every 12 hours, As needed, Cough (27 Oct 2021 09:24)  hydrALAZINE 100 mg oral tablet: 1 tab(s) orally 3 times a day (25 Oct 2021 14:57)  isosorbide mononitrate 60 mg oral tablet, extended release: 1 tab(s) orally once a day (25 Oct 2021 14:57)  lamoTRIgine 100 mg oral tablet: 1 tab(s) orally once a day (25 Oct 2021 14:57)  mirtazapine 30 mg oral tablet: 1 tab(s) orally once a day (at bedtime) (25 Oct 2021 14:57)  venlafaxine 150 mg oral capsule, extended release: 1 cap(s) orally once a day (25 Oct 2021 14:57)      MEDICATIONS  (STANDING):  amLODIPine   Tablet 10 milliGRAM(s) Oral daily  apixaban 5 milliGRAM(s) Oral every 12 hours  ARIPiprazole 30 milliGRAM(s) Oral daily  aspirin enteric coated 81 milliGRAM(s) Oral daily  atorvastatin 10 milliGRAM(s) Oral at bedtime  budesonide 160 MICROgram(s)/formoterol 4.5 MICROgram(s) Inhaler 2 Puff(s) Inhalation two times a day  cloNIDine 0.2 milliGRAM(s) Oral every 12 hours  cyanocobalamin 1000 MICROGram(s) Oral daily  doxazosin 8 milliGRAM(s) Oral at bedtime  famotidine    Tablet 20 milliGRAM(s) Oral daily  folic acid 1 milliGRAM(s) Oral daily  furosemide    Tablet 60 milliGRAM(s) Oral two times a day  hydrALAZINE 100 milliGRAM(s) Oral three times a day  isosorbide   mononitrate ER Tablet (IMDUR) 60 milliGRAM(s) Oral daily  lamoTRIgine 100 milliGRAM(s) Oral daily  mirtazapine 30 milliGRAM(s) Oral at bedtime  venlafaxine XR. 150 milliGRAM(s) Oral daily    MEDICATIONS  (PRN):  acetaminophen     Tablet .. 650 milliGRAM(s) Oral every 6 hours PRN Mild Pain (1 - 3)  guaiFENesin  milliGRAM(s) Oral every 12 hours PRN Cough      Allergies    No Known Allergies    Intolerances        Social History:  Tobacco: quit in 1980, not active smoker  EtOH: denies   Recreational drug use: cocaine several years ago "a few times" has not used in "many years"  Lives with: [facility]; group home; no nursing assistance; observation is there  Ambulates: independent, denies walker or cane but uses guard railings  ADLs: independent, but states need for assistance in bathing, cooking; independent with feeding, ambulating  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 No nausea, No vomiting; No diarrhea, No constipation. [ x ] BM  GENITOURINARY: No dysuria, No frequency, No urgency, No hematuria, NO incontinence  NEUROLOGICAL: No headaches, No dizziness, No numbness, No tingling, No tremors, No weakness  EXT: No Swelling, No Pain, No Edema  SKIN:  [  ] No itching, burning, rashes, or lesions   MUSCULOSKELETAL: No joint pain ,No Jt swelling; No muscle pain, No back pain, No extremity pain  PSYCHIATRIC: No depression,  No anxiety,  No mood swings ,No difficulty sleeping at night  PAIN SCALE: [ x ] None  [  ] Other-  ROS Unable to obtain due to - [  ] Dementia  [  ] Lethargy [  ] Drowsy [  ] Sedated [  ] non verbal  REST OF REVIEW Of SYSTEM - [ x ] Normal     Vital Signs Last 24 Hrs  T(C): 36.5 (28 Oct 2021 04:41), Max: 36.7 (27 Oct 2021 20:07)  T(F): 97.7 (28 Oct 2021 04:41), Max: 98 (27 Oct 2021 20:07)  HR: 64 (28 Oct 2021 04:41) (64 - 67)  BP: 154/71 (28 Oct 2021 06:43) (154/71 - 191/88)  BP(mean): --  RR: 19 (28 Oct 2021 04:41) (17 - 19)  SpO2: 95% (28 Oct 2021 04:41) (92% - 96%)  Finger Stick        10-27 @ 07:01  -  10-28 @ 07:00  --------------------------------------------------------  IN: 0 mL / OUT: 300 mL / NET: -300 mL    10-28 @ 07:01  -  10-28 @ 10:48  --------------------------------------------------------  IN: 200 mL / OUT: 0 mL / NET: 200 mL        PHYSICAL EXAM:   GENERAL:  [ x ] NAD , [ x ] well appearing, [  ] Agitated, [  ] Drowsy,  [  ] Lethargy, [  ] confused   HEAD:  [ x ] Normal, [  ] Other  EYES:  [ x ] EOMI, [ x ] PERRLA, [ x ] conjunctiva and sclera clear normal, [  ] Other,  [ x ] Pallor,[  ] Discharge  ENMT:  [x  ] Normal, [ x ] Moist mucous membranes, [ x ] Good dentition, [ x ] No Thrush  NECK:  [ x ] Supple, [ x ] No JVD, [ x ] Normal thyroid, [  ] Lymphadenopathy [  ] Other  CHEST/LUNG:  [ x ] Clear to auscultation bilaterally, [ x ] Breath Sounds equal B/L / Decrease, [ x ] poor effort  [ x ] No rales, [ x ] No rhonchi  [ x ]  No wheezing,   HEART:  [ x ] Regular rate and rhythm, [  ] tachycardia, [  ] Bradycardia,  [  ] irregular  [x  ] No murmurs, No rubs, No gallops, [  ] PPM in place (Mfr:  )  ABDOMEN:  [ x ] Soft, [ x ] Nontender, [ x ] Nondistended, [x  ] No mass, [ x ] Bowel sounds present, [x  ] obese  NERVOUS SYSTEM:  [ x ] Alert & Oriented X3, [ x ] Nonfocal  [  ] Confusion  [  ] Encephalopathic [  ] Sedated [  ] Unable to assess, [  ] Dementia [  ] Other-  EXTREMITIES: [x  ] 2+ Peripheral Pulses, No clubbing, No cyanosis,  [  ] edema B/L lower EXT. [  ] PVD stasis skin changes B/L Lower EXT, [  ] wound  LYMPH: No lymphadenopathy noted  SKIN:  [ x ] No rashes or lesions, [  ] Pressure Ulcers, [  ] ecchymosis, [  ] Skin Tears, [  x] Other- skin Tattoos on Ext    DIET: Diet, Dysphagia 3 Soft-Thin Liquids:   Consistent Carbohydrate No Snacks  Low Sodium (10-24-21 @ 11:36)      LABS:      Ca    9.1        27 Oct 2021 07:25        CARDIAC MARKERS ( 23 Oct 2021 14:13 )  .022 ng/mL / x     / 40 U/L / x     / 1.4 ng/mL  CARDIAC MARKERS ( 23 Oct 2021 09:42 )  .034 ng/mL / x     / 44 U/L / x     / 1.6 ng/mL        RADIOLOGY & ADDITIONAL TESTS:  NONE    HEALTH ISSUES - PROBLEM Dx:  Acute exacerbation of CHF (congestive heart failure)    Hypertensive urgency    HLD (hyperlipidemia)    CAD (coronary artery disease)  s/p stents (not on plavix)    Type 2 diabetes mellitus  not on home insulin    Atrial fibrillation  first documented on EKG 10/7/2021    Depression with anxiety    Pneumonia    DVT prophylaxis    Stage 3 chronic kidney disease    Anemia    Elevated troponin        Consultant(s) Notes Reviewed:  [ x ] YES     Care Discussed with [X] Consultants  [ x ] Patient  [  ] Family [  ] HCP [  ]   [ x ] Social Service  [ x ] RN, [  ] Physical Therapy,[  ] Palliative care team  DVT PPX: [  ] Lovenox, [  ] S C Heparin, [  ] Coumadin, [  ] Xarelto, [ x ] Eliquis, [  ] Pradaxa, [  ] IV Heparin drip, [  ] SCD [  ] Contraindication 2 to GI Bleed,[  ] Ambulation [  ] Contraindicated 2 to  bleed [  ] Contraindicated 2 to Brain Bleed  Advanced directive: [ x ] None, [  ] DNR/DNI

## 2021-10-28 NOTE — PROGRESS NOTE ADULT - PROBLEM SELECTOR PLAN 3
- Pt was recent admitted to [facility] from 10/7-10/16 and treated for PNA with IV Zosyn & Augmentin x 7 days   - Although CxR shows bilateral lung parenchymal airspace opacities, increased from prior pt does not have leukocytosis or fever and it is suspected SOB is more so 2/2 CHF exacerbation. CxR findings are suspected to be from previously treated PNA  - s/p vanc and zosyn in ED. will hold off on further Abx at this time.as per Pulmonary Dr Day   - F/u Cultures: pending results, Nl Lactate   - official S&S: dysphagis 3, soft solids   - Pulm, Dr. Day,- Keep Off Abx , glorialey Old PNA   Continue Symbicort 2x day.

## 2021-10-28 NOTE — PROGRESS NOTE ADULT - SUBJECTIVE AND OBJECTIVE BOX
Patient is a 76y old  Male who presents with a chief complaint of shortness of breath (19 Oct 2021 09:10)  Patient seen in follow up for CKD 4, fluid overload.        PAST MEDICAL HISTORY:  Hypertension    Diabetes mellitus    Lupus    Hepatitis C    Anxiety and depression    CAD (coronary artery disease)    Diverticulosis    Hyperlipidemia    HTN (hypertension)    HLD (hyperlipidemia)    Atrial fibrillation    CAD (coronary artery disease)    Type 2 diabetes mellitus    Anxiety    History of diverticulitis    Diverticulosis    Afib    Stage 3 chronic kidney disease    Anemia of chronic disease    Chronic diastolic congestive heart failure    Multiple thyroid nodules      MEDICATIONS  (STANDING):  amLODIPine   Tablet 10 milliGRAM(s) Oral daily  apixaban 5 milliGRAM(s) Oral every 12 hours  ARIPiprazole 30 milliGRAM(s) Oral daily  aspirin enteric coated 81 milliGRAM(s) Oral daily  atorvastatin 10 milliGRAM(s) Oral at bedtime  budesonide 160 MICROgram(s)/formoterol 4.5 MICROgram(s) Inhaler 2 Puff(s) Inhalation two times a day  cloNIDine 0.2 milliGRAM(s) Oral every 12 hours  cyanocobalamin 1000 MICROGram(s) Oral daily  doxazosin 8 milliGRAM(s) Oral at bedtime  famotidine    Tablet 20 milliGRAM(s) Oral daily  folic acid 1 milliGRAM(s) Oral daily  furosemide    Tablet 60 milliGRAM(s) Oral two times a day  hydrALAZINE 100 milliGRAM(s) Oral three times a day  isosorbide   mononitrate ER Tablet (IMDUR) 60 milliGRAM(s) Oral daily  lamoTRIgine 100 milliGRAM(s) Oral daily  mirtazapine 30 milliGRAM(s) Oral at bedtime  polyethylene glycol 3350 17 Gram(s) Oral daily  venlafaxine XR. 150 milliGRAM(s) Oral daily    MEDICATIONS  (PRN):  acetaminophen     Tablet .. 650 milliGRAM(s) Oral every 6 hours PRN Mild Pain (1 - 3)  guaiFENesin  milliGRAM(s) Oral every 12 hours PRN Cough    T(C): 36.4 (10-28-21 @ 11:14), Max: 36.7 (10-27-21 @ 20:07)  HR: 74 (10-28-21 @ 11:14) (64 - 74)  BP: 161/72 (10-28-21 @ 11:14) (154/71 - 191/88)  RR: 18 (10-28-21 @ 11:14)  SpO2: 95% (10-28-21 @ 11:14)  Wt(kg): --  I&O's Detail    27 Oct 2021 07:01  -  28 Oct 2021 07:00  --------------------------------------------------------  IN:  Total IN: 0 mL    OUT:    Voided (mL): 300 mL  Total OUT: 300 mL    Total NET: -300 mL      28 Oct 2021 07:01  -  28 Oct 2021 14:28  --------------------------------------------------------  IN:    Oral Fluid: 400 mL  Total IN: 400 mL    OUT:    Voided (mL): 300 mL  Total OUT: 300 mL    Total NET: 100 mL          PHYSICAL EXAM:  General: no distress  Respiratory: b/l air entry  Cardiovascular: S1 S2  Gastrointestinal: soft  Extremities: + edema        LABORATORY:                        8.0    6.80  )-----------( 261      ( 27 Oct 2021 07:25 )             26.4     10-27    142  |  108  |  47<H>  ----------------------------<  108<H>  3.8   |  26  |  2.50<H>    Ca    9.1      27 Oct 2021 07:25    TPro  6.8  /  Alb  2.8<L>  /  TBili  0.3  /  DBili  x   /  AST  17  /  ALT  24  /  AlkPhos  50  10-27    Sodium, Serum: 142 mmol/L (10-27 @ 07:25)    Potassium, Serum: 3.8 mmol/L (10-27 @ 07:25)    Hemoglobin: 8.0 g/dL (10-27 @ 07:25)  Hemoglobin: 8.3 g/dL (10-26 @ 08:34)    Creatinine, Serum 2.50 (10-27 @ 07:25)  Creatinine, Serum 2.30 (10-26 @ 08:34)        LIVER FUNCTIONS - ( 27 Oct 2021 07:25 )  Alb: 2.8 g/dL / Pro: 6.8 g/dL / ALK PHOS: 50 U/L / ALT: 24 U/L / AST: 17 U/L / GGT: x

## 2021-10-28 NOTE — PROGRESS NOTE ADULT - ATTENDING COMMENTS
Seen/examined. agree with above.  feeling better and close to euvolemia, with rate controlled af  cont current bp regimen  hopefully dc planning

## 2021-10-28 NOTE — PROGRESS NOTE ADULT - ASSESSMENT
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) living in a group home [facility]    CM noted reviewed - peer to peer review -   Mucinex added for cough rx regimen  clonidine and imdur added for BP control  on lasix  vs noted  PPD read neg   dc planning - cm notes reviewed -     completed ABX for PNA  ct chest from recent admission reviewed, cxr noted, repeat CT chest reviewed - multifocal pna reported - however - clinically does not correlate -   would monitor off ABX - biomarkers - cx - monitor VS and Sat  assess - eval for HF - vol overload - Diuresis - I and O - cardio eval - TTE reviewed  ELMER - does not tolerate CPAP  CKD - monitor renal indices - i and o - replete lytes  dysphagia diet - asp prec - HOB elev - oral hygiene  assist with needs - ADL  emotional support  tele monitor  old records reviewed

## 2021-10-28 NOTE — PROGRESS NOTE ADULT - ASSESSMENT
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 Cardiology consulted for SOB and chest pain    Pleural effusion  - CT chest showed small B/L pleural effusions and multifocal Pna s/p Abx.  Has been on RA  - ECHO LVEF 55-60%. LV diastolic function cannot determine due to atrial fibrillation. Mild mitral regurgitation is present. Mild aortic stenosis is  present. Mild tricuspid regurgitation is present . No evidence of any pulmonary hypertension  - BNP: 32039 S/P Lasix IV 60mg Q12, now euvolemic and is on PO Lasix 60 mg PO BID   - Creatinine trend stable:  <--2.50,  <--2.30,  <--2.30,  <--2.70,   - Daily weight,. Monitor Strict I/O's,    Chest pain (Atypical)   - Patient with hx of CAD s/p stents  - EKG showed Afib, no ischemic changes noted  - Mild troponin leak likely in setting of ARIELLA, peaked and trended down  - No clinical evidence of ACS  - Continue with ASA and statin     Afib  - Rate-controlled.  Not on any AVN blocker  - Continue Eliquis  - Monitor electrolytes, replete to keep K>4 and Mag>2    Hypertensive Urgency   - Presented with /90 s/p Hydralazine in ED 10 mg IV X2 doses  - BP improved however remains elevated  SBP 150s-160  - Continue Hydralazine 100 mg q8H  - Continue Norvasc 10 mg   - Continue Imdur 60 and Clonidine 0.2.   - Would increase Imdur to 60 q12     - Monitor and replete lytes, keep K>4, Mg>2.  - All other medical needs as per primary team.  - Other cardiovascular workup will depend on clinical course.  - Will continue to follow.    Bernice Arora, MS ANP, AGACNP  Nurse Practitioner- Cardiology   Spectra #[phone]

## 2021-10-28 NOTE — PROGRESS NOTE ADULT - SUBJECTIVE AND OBJECTIVE BOX
[facility] -- Fifi Bliss, Kalpesh Valdovinos, Abraham Sheridan Savella, Goodger: Office # [phone]    Follow Up:  SOB, Chest pain, Hx of Afib    Subjective/Observations: Patient seen and examined. Patient awake, alert, resting comfortably in bed. No complaints of chest pain, dyspnea, palpitations or dizziness. Tolerating room air. Would like to go home today.    REVIEW OF SYSTEMS: All review of systems is negative for eye, ENT, GI, , allergic, dermatologic, musculoskeletal and neurologic except as described above    PAST MEDICAL & SURGICAL HISTORY:  HTN (hypertension)  c/b multiple episodes of hypertensive urgency    HLD (hyperlipidemia)    Atrial fibrillation  first documented on EKG 10/7/2021    CAD (coronary artery disease)  s/p stents (not on plavix)    Type 2 diabetes mellitus  not on home insulin/ Meds    Anxiety  Depression  multiple psych medications    History of diverticulitis  07/2021    Diverticulosis  c/b GIB in 2020    Afib  on AC    Stage 3 chronic kidney disease    Anemia of chronic disease  Monoclonal Gammopathy-MGUS  pRBC transfusion 10/15/21    Chronic diastolic congestive heart failure    Multiple thyroid nodules    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  Had surgery ( April 2013 )    S/P tonsillectomy    S/P arthroscopic knee surgery  Bilateral ( 2005 )    Torsion of testicle  Had surgery at age 14    Pilonidal cyst  Had surgery ( 1969 )    S/P cataract surgery  Bilateral    H/O hernia repair        MEDICATIONS  (STANDING):  amLODIPine   Tablet 10 milliGRAM(s) Oral daily  apixaban 5 milliGRAM(s) Oral every 12 hours  ARIPiprazole 30 milliGRAM(s) Oral daily  aspirin enteric coated 81 milliGRAM(s) Oral daily  atorvastatin 10 milliGRAM(s) Oral at bedtime  budesonide 160 MICROgram(s)/formoterol 4.5 MICROgram(s) Inhaler 2 Puff(s) Inhalation two times a day  cloNIDine 0.2 milliGRAM(s) Oral every 12 hours  cyanocobalamin 1000 MICROGram(s) Oral daily  doxazosin 8 milliGRAM(s) Oral at bedtime  famotidine    Tablet 20 milliGRAM(s) Oral daily  folic acid 1 milliGRAM(s) Oral daily  furosemide    Tablet 60 milliGRAM(s) Oral two times a day  hydrALAZINE 100 milliGRAM(s) Oral three times a day  isosorbide   mononitrate ER Tablet (IMDUR) 60 milliGRAM(s) Oral daily  lamoTRIgine 100 milliGRAM(s) Oral daily  mirtazapine 30 milliGRAM(s) Oral at bedtime  venlafaxine XR. 150 milliGRAM(s) Oral daily    MEDICATIONS  (PRN):  acetaminophen     Tablet .. 650 milliGRAM(s) Oral every 6 hours PRN Mild Pain (1 - 3)  guaiFENesin  milliGRAM(s) Oral every 12 hours PRN Cough    Allergies    No Known Allergies    Intolerances      Vital Signs Last 24 Hrs  T(C): 36.4 (28 Oct 2021 11:14), Max: 36.7 (27 Oct 2021 20:07)  T(F): 97.5 (28 Oct 2021 11:14), Max: 98 (27 Oct 2021 20:07)  HR: 74 (28 Oct 2021 11:14) (64 - 74)  BP: 161/72 (28 Oct 2021 11:14) (154/71 - 191/88)  BP(mean): --  RR: 18 (28 Oct 2021 11:14) (17 - 19)  SpO2: 95% (28 Oct 2021 11:14) (92% - 96%)  I&O's Summary    27 Oct 2021 07:01  -  28 Oct 2021 07:00  --------------------------------------------------------  IN: 0 mL / OUT: 300 mL / NET: -300 mL    28 Oct 2021 07:01  -  28 Oct 2021 11:33  --------------------------------------------------------  IN: 200 mL / OUT: 0 mL / NET: 200 mL          TELE: Not on telemetry   PHYSICAL EXAM:  Appearance: NAD, no distress, alert, Well developed   HEENT: Moist Mucous Membranes, Anicteric  Cardiovascular: Regular rate and rhythm, Normal S1 S2, No JVD, No murmurs, No rubs, gallops or clicks  Respiratory: Non-labored, Clear to auscultation, No rales, No rhonchi, No wheezing.   Gastrointestinal:  Soft, Non-tender, + BS  Neurologic: Non-focal  Skin: Warm and dry, No visible rashes or ulcers, No ecchymosis, No cyanosis  Musculoskeletal: No clubbing, No cyanosis, No joint swelling/tenderness  Psychiatry: Mood & affect appropriate  Lymph: No peripheral edema.     LABS: All Labs Reviewed:                        8.0    6.80  )-----------( 261      ( 27 Oct 2021 07:25 )             26.4                         8.3    6.18  )-----------( 263      ( 26 Oct 2021 08:34 )             26.9     27 Oct 2021 07:25    142    |  108    |  47     ----------------------------<  108    3.8     |  26     |  2.50   26 Oct 2021 08:34    142    |  108    |  44     ----------------------------<  108    3.3     |  26     |  2.30     Ca    9.1        27 Oct 2021 07:25  Ca    9.0        26 Oct 2021 08:34    TPro  6.8    /  Alb  2.8    /  TBili  0.3    /  DBili  x      /  AST  17     /  ALT  24     /  AlkPhos  50     27 Oct 2021 07:25  TPro  6.7    /  Alb  2.8    /  TBili  0.3    /  DBili  x      /  AST  15     /  ALT  22     /  AlkPhos  49     26 Oct 2021 08:34      Creatine Kinase, Serum: 40 U/L (10-23-21 @ 14:13)  Troponin I, Serum: .022 ng/mL (10-23-21 @ 14:13)  Creatine Kinase, Serum: 44 U/L (10-23-21 @ 09:42)  Troponin I, Serum: .034 ng/mL (10-23-21 @ 09:42)  Troponin I, Serum: .084 ng/mL (10-19-21 @ 02:22)            Cholesterol, Serum: 174 mg/dL (10-07-21 @ 11:54)  HDL Cholesterol, Serum: 42 mg/dL (10-07-21 @ 11:54)  Triglycerides, Serum: 160 mg/dL (10-07-21 @ 11:54)      12 Lead ECG:   Ventricular Rate 87 BPM    Atrial Rate 166 BPM    QRS Duration 110 ms    Q-T Interval 392 ms    QTC Calculation(Bazett) 471 ms    R Axis 4 degrees    T Axis 15 degrees    Diagnosis Line Atrial fibrillation  Nonspecific ST and T wave abnormality  Prolonged QT  Abnormal ECG  Confirmed by lynne Monterroso ([id number]) on 10/23/2021 3:39:52 PM (10-23-21 @ 08:43)    EXAM:  ECHO TTE WO CON COMP W DOPP    PROCEDURE DATE:  10/13/2021      INTERPRETATION:  Ordering Physician: SKYLA TERRY [id number]  Indication: Cardiac arrhythmias.  Technician: INDIGO  Study Quality: Technical difficult study.  A complete echocardiographic study was performed utilizing standard protocol including spectral and color Doppler in all echocardiographic windows.  Height: 172cm  Weight: 102kg  BSA: 2.1m2  Blood Pressure: 150/90  MEASUREMENTS  IVS: 1.3cm  PWT: 1.3cm  LA: 4.2cm  AO: 3.5cm  LVIDd: 5.4 cm.  LVIDs: 3.5cm  LVEF: 55-60%.  PASP: 34mm Hg  FINDINGS  Left Ventricle: Normal in  size and normal LV systolic function with estimated LVEF 55-60%.  Right Ventricle: Normal in size and normal RV systolic function.  Left Atrium: Mild dilated.  Right Atrium: Normal in size.  Mitral Valve: Mild mitral annular calcification is present. Mitral valve is mildly calcified and no evidence of any mitral stenosis. Mild mitral regurgitation is present.  Aortic Valve: Aortic root is mildsclerotic and of normal dimension. Aortic valve is mildly calcified and mild  aortic stenosis is present with peak gradient across aortic valve is 30 mmHg. Aortic valve area not calculated.  Tricuspid Valve: Mild tricuspid regurgitation with calculated PA systolic pressure 34 mmHg is present. No evidence of any pulmonary hypertension  Pulmonic Valve: Trace Pulmonary regurgitation is present.  Diastolic Function: LV diastolic function cannot determine due to  underlying atrial fibrillation.  Pericardium/Pleura: No evidence of any pericardial effusion.  No intracardiac mass  thrombus or vegetations and  tumor.  Mild concentric left ventricular hypertrophy is present.    IMPRESSION:  Normal LV size with normal LV systolic function with estimated LVEF 55-60%.  LV diastolic function cannot determine due to atrial fibrillation.  Mild mitral regurgitation is present.  Mild aortic stenosis is  present.  Mild tricuspid regurgitation is present . No evidence of any pulmonary hypertension  Correlate clinically above findings.    --- End of Report ---    SKYLA TERRY MD; Attending Cardiologist  This document has been electronically signed. Oct 13 2021 11:55PM    EXAM:  US DPLX LWR EXT VEINS COMPL BI                        PROCEDURE DATE:  10/18/2021    INTERPRETATION:  CLINICAL INFORMATION: Lower extremity edema  COMPARISON: 07/20/2021.    TECHNIQUE: Duplex sonography of the BILATERAL LOWER extremity veins with color and spectral Doppler, with and without compression.    FINDINGS:  RIGHT:  Normal compressibility of the RIGHT common femoral, femoral and popliteal veins.  Doppler examination shows normal spontaneous and phasic flow.  No RIGHT calf vein thrombosis is detected.  Right popliteal fluid collection measures 4.2 x 3.3 x 1.3 cm.    LEFT:  Normal compressibility of the LEFT common femoral, femoral and popliteal veins.  Doppler examination shows normal spontaneous and phasic flow.  No LEFT calf vein thrombosis is detected.  Left popliteal fossa fluid collection measuring 4.9 x 2.8 x 1.3 cm.    IMPRESSION:  No evidence of deep venous thrombosis in either lower extremity.  Bilateral popliteal fossa fluid collections, likely Baker's cysts.  --- End of Report ---  ROMMEL GALEANO MD; Attending Radiologist  This document has been electronically signed. Oct 18 2021  2:21PM

## 2021-10-28 NOTE — PROGRESS NOTE ADULT - PROBLEM SELECTOR PLAN 12
- AC with Eliquis.    Discharge planning:  - COVID PCR negative 10/27  - jim, f/u KIERA. medication adherence concerns

## 2021-10-28 NOTE — PROGRESS NOTE ADULT - SUBJECTIVE AND OBJECTIVE BOX
Date/Time Patient Seen:  		  Referring MD:   Data Reviewed	       Patient is a 76y old  Male who presents with a chief complaint of hypertensive urgency (27 Oct 2021 09:26)      Subjective/HPI     PAST MEDICAL & SURGICAL HISTORY:  Hypertension    Diabetes mellitus    Lupus    Hepatitis C    Anxiety and depression    CAD (coronary artery disease)  s/p stents    Diverticulosis    Hyperlipidemia    HTN (hypertension)  c/b multiple episodes of hypertensive urgency    HLD (hyperlipidemia)    Atrial fibrillation  first documented on EKG 10/7/2021    CAD (coronary artery disease)  s/p stents (not on plavix)    Type 2 diabetes mellitus  not on home insulin/ Meds    Anxiety  Depression  multiple psych medications    History of diverticulitis  07/2021    Diverticulosis  c/b GIB in 2020    Afib  on AC    Stage 3 chronic kidney disease    Anemia of chronic disease  Monoclonal Gammopathy-MGUS  pRBC transfusion 10/15/21    Chronic diastolic congestive heart failure    Multiple thyroid nodules    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  Had surgery ( April 2013 )    S/P tonsillectomy    S/P arthroscopic knee surgery  Bilateral ( 2005 )    Torsion of testicle  Had surgery at age 14    Pilonidal cyst  Had surgery ( 1969 )    S/P cataract surgery  Bilateral    H/O hernia repair          Medication list         MEDICATIONS  (STANDING):  amLODIPine   Tablet 10 milliGRAM(s) Oral daily  apixaban 5 milliGRAM(s) Oral every 12 hours  ARIPiprazole 30 milliGRAM(s) Oral daily  aspirin enteric coated 81 milliGRAM(s) Oral daily  atorvastatin 10 milliGRAM(s) Oral at bedtime  budesonide 160 MICROgram(s)/formoterol 4.5 MICROgram(s) Inhaler 2 Puff(s) Inhalation two times a day  cloNIDine 0.2 milliGRAM(s) Oral every 12 hours  cyanocobalamin 1000 MICROGram(s) Oral daily  doxazosin 8 milliGRAM(s) Oral at bedtime  famotidine    Tablet 20 milliGRAM(s) Oral daily  folic acid 1 milliGRAM(s) Oral daily  furosemide    Tablet 60 milliGRAM(s) Oral two times a day  hydrALAZINE 100 milliGRAM(s) Oral three times a day  isosorbide   mononitrate ER Tablet (IMDUR) 60 milliGRAM(s) Oral daily  lamoTRIgine 100 milliGRAM(s) Oral daily  mirtazapine 30 milliGRAM(s) Oral at bedtime  venlafaxine XR. 150 milliGRAM(s) Oral daily    MEDICATIONS  (PRN):  acetaminophen     Tablet .. 650 milliGRAM(s) Oral every 6 hours PRN Mild Pain (1 - 3)  guaiFENesin  milliGRAM(s) Oral every 12 hours PRN Cough         Vitals log        ICU Vital Signs Last 24 Hrs  T(C): 36.5 (28 Oct 2021 04:41), Max: 36.7 (27 Oct 2021 20:07)  T(F): 97.7 (28 Oct 2021 04:41), Max: 98 (27 Oct 2021 20:07)  HR: 64 (28 Oct 2021 04:41) (64 - 67)  BP: 188/88 (28 Oct 2021 05:08) (165/82 - 191/88)  BP(mean): --  ABP: --  ABP(mean): --  RR: 19 (28 Oct 2021 04:41) (17 - 19)  SpO2: 95% (28 Oct 2021 04:41) (92% - 96%)           Input and Output:  I&O's Detail      Lab Data                        8.0    6.80  )-----------( 261      ( 27 Oct 2021 07:25 )             26.4     10-27    142  |  108  |  47<H>  ----------------------------<  108<H>  3.8   |  26  |  2.50<H>    Ca    9.1      27 Oct 2021 07:25    TPro  6.8  /  Alb  2.8<L>  /  TBili  0.3  /  DBili  x   /  AST  17  /  ALT  24  /  AlkPhos  50  10-27            Review of Systems	      Objective     Physical Examination    heart s1s2  lung dec BS  abd soft      Pertinent Lab findings & Imaging      Nikko:  NO   Adequate UO     I&O's Detail           Discussed with:     Cultures:	        Radiology

## 2021-10-28 NOTE — PROGRESS NOTE ADULT - ATTENDING COMMENTS
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 Pt was already Rxed for PNA with IV Zosyn last week,  Admitted for ac on Chronic Diastolic  CHF exacerbation and HTN urgency.  Pt seen, Examined, case & care plan d/w pt, residents at detail.  -Concern for NON Compliance with meds /supervision at [facility].  Pulmonary-Dr Day follow up  Cardio-follow up -Stable BP  Psych-DR Winkler d/w at detail, patient appears to have limited insight into medication , No capacity   PT Eval.---> NO PT need  Social service Eval. d/w at detail, start D/C Plan .   PPD  placed-Rt Forearm- 0 mm Induration -NEG , plan for COVID 19 PCR NEG   Total care time is 40 minutes. , pt is stable for d/c .  D/W Social service at detail.

## 2021-10-28 NOTE — PROGRESS NOTE ADULT - ASSESSMENT
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

## 2021-10-28 NOTE — PROGRESS NOTE ADULT - NSPROGADDITIONALINFOA_GEN_ALL_CORE
as Per DR Winkler  Patient is 1) cognitively grossly intact 2) Does not understand the nature of the medical condition, risks, benefits, alternatives and side-effects of treatment 3) Wants to make decisions voluntarily.  At this time patient has no decision making capacity regarding medical decisions.

## 2021-10-29 ENCOUNTER — TRANSCRIPTION ENCOUNTER (OUTPATIENT)
Age: 76
End: 2021-10-29

## 2021-10-29 VITALS
SYSTOLIC BLOOD PRESSURE: 167 MMHG | OXYGEN SATURATION: 93 % | TEMPERATURE: 98 F | HEART RATE: 70 BPM | RESPIRATION RATE: 19 BRPM | DIASTOLIC BLOOD PRESSURE: 78 MMHG

## 2021-10-29 LAB
ANION GAP SERPL CALC-SCNC: 10 MMOL/L — SIGNIFICANT CHANGE UP (ref 5–17)
BUN SERPL-MCNC: 44 MG/DL — HIGH (ref 7–23)
CALCIUM SERPL-MCNC: 9.2 MG/DL — SIGNIFICANT CHANGE UP (ref 8.5–10.1)
CHLORIDE SERPL-SCNC: 108 MMOL/L — SIGNIFICANT CHANGE UP (ref 96–108)
CO2 SERPL-SCNC: 24 MMOL/L — SIGNIFICANT CHANGE UP (ref 22–31)
CREAT SERPL-MCNC: 2.5 MG/DL — HIGH (ref 0.5–1.3)
GLUCOSE SERPL-MCNC: 202 MG/DL — HIGH (ref 70–99)
HCT VFR BLD CALC: 29.8 % — LOW (ref 39–50)
HGB BLD-MCNC: 9.2 G/DL — LOW (ref 13–17)
MCHC RBC-ENTMCNC: 26.2 PG — LOW (ref 27–34)
MCHC RBC-ENTMCNC: 30.9 GM/DL — LOW (ref 32–36)
MCV RBC AUTO: 84.9 FL — SIGNIFICANT CHANGE UP (ref 80–100)
NRBC # BLD: 0 /100 WBCS — SIGNIFICANT CHANGE UP (ref 0–0)
PLATELET # BLD AUTO: 275 K/UL — SIGNIFICANT CHANGE UP (ref 150–400)
POTASSIUM SERPL-MCNC: 3.3 MMOL/L — LOW (ref 3.5–5.3)
POTASSIUM SERPL-SCNC: 3.3 MMOL/L — LOW (ref 3.5–5.3)
RBC # BLD: 3.51 M/UL — LOW (ref 4.2–5.8)
RBC # FLD: 17.1 % — HIGH (ref 10.3–14.5)
SODIUM SERPL-SCNC: 142 MMOL/L — SIGNIFICANT CHANGE UP (ref 135–145)
WBC # BLD: 6.86 K/UL — SIGNIFICANT CHANGE UP (ref 3.8–10.5)
WBC # FLD AUTO: 6.86 K/UL — SIGNIFICANT CHANGE UP (ref 3.8–10.5)

## 2021-10-29 PROCEDURE — 86769 SARS-COV-2 COVID-19 ANTIBODY: CPT

## 2021-10-29 PROCEDURE — 71045 X-RAY EXAM CHEST 1 VIEW: CPT

## 2021-10-29 PROCEDURE — 84550 ASSAY OF BLOOD/URIC ACID: CPT

## 2021-10-29 PROCEDURE — 87086 URINE CULTURE/COLONY COUNT: CPT

## 2021-10-29 PROCEDURE — 97530 THERAPEUTIC ACTIVITIES: CPT

## 2021-10-29 PROCEDURE — 0225U NFCT DS DNA&RNA 21 SARSCOV2: CPT

## 2021-10-29 PROCEDURE — 99232 SBSQ HOSP IP/OBS MODERATE 35: CPT

## 2021-10-29 PROCEDURE — 83880 ASSAY OF NATRIURETIC PEPTIDE: CPT

## 2021-10-29 PROCEDURE — 81001 URINALYSIS AUTO W/SCOPE: CPT

## 2021-10-29 PROCEDURE — 93970 EXTREMITY STUDY: CPT

## 2021-10-29 PROCEDURE — 74230 X-RAY XM SWLNG FUNCJ C+: CPT

## 2021-10-29 PROCEDURE — 83605 ASSAY OF LACTIC ACID: CPT

## 2021-10-29 PROCEDURE — 85610 PROTHROMBIN TIME: CPT

## 2021-10-29 PROCEDURE — A9698: CPT

## 2021-10-29 PROCEDURE — 82553 CREATINE MB FRACTION: CPT

## 2021-10-29 PROCEDURE — 80053 COMPREHEN METABOLIC PANEL: CPT

## 2021-10-29 PROCEDURE — 86901 BLOOD TYPING SEROLOGIC RH(D): CPT

## 2021-10-29 PROCEDURE — 96375 TX/PRO/DX INJ NEW DRUG ADDON: CPT

## 2021-10-29 PROCEDURE — 85025 COMPLETE CBC W/AUTO DIFF WBC: CPT

## 2021-10-29 PROCEDURE — 36415 COLL VENOUS BLD VENIPUNCTURE: CPT

## 2021-10-29 PROCEDURE — 71250 CT THORAX DX C-: CPT

## 2021-10-29 PROCEDURE — 86900 BLOOD TYPING SEROLOGIC ABO: CPT

## 2021-10-29 PROCEDURE — 93005 ELECTROCARDIOGRAM TRACING: CPT

## 2021-10-29 PROCEDURE — 85730 THROMBOPLASTIN TIME PARTIAL: CPT

## 2021-10-29 PROCEDURE — 83735 ASSAY OF MAGNESIUM: CPT

## 2021-10-29 PROCEDURE — 86850 RBC ANTIBODY SCREEN: CPT

## 2021-10-29 PROCEDURE — 96366 THER/PROPH/DIAG IV INF ADDON: CPT

## 2021-10-29 PROCEDURE — 92611 MOTION FLUOROSCOPY/SWALLOW: CPT

## 2021-10-29 PROCEDURE — 92610 EVALUATE SWALLOWING FUNCTION: CPT

## 2021-10-29 PROCEDURE — 87635 SARS-COV-2 COVID-19 AMP PRB: CPT

## 2021-10-29 PROCEDURE — 80048 BASIC METABOLIC PNL TOTAL CA: CPT

## 2021-10-29 PROCEDURE — 97110 THERAPEUTIC EXERCISES: CPT

## 2021-10-29 PROCEDURE — 86140 C-REACTIVE PROTEIN: CPT

## 2021-10-29 PROCEDURE — 85027 COMPLETE CBC AUTOMATED: CPT

## 2021-10-29 PROCEDURE — 84145 PROCALCITONIN (PCT): CPT

## 2021-10-29 PROCEDURE — 94640 AIRWAY INHALATION TREATMENT: CPT

## 2021-10-29 PROCEDURE — 99285 EMERGENCY DEPT VISIT HI MDM: CPT

## 2021-10-29 PROCEDURE — 84100 ASSAY OF PHOSPHORUS: CPT

## 2021-10-29 PROCEDURE — 96365 THER/PROPH/DIAG IV INF INIT: CPT

## 2021-10-29 PROCEDURE — 82550 ASSAY OF CK (CPK): CPT

## 2021-10-29 PROCEDURE — 84484 ASSAY OF TROPONIN QUANT: CPT

## 2021-10-29 PROCEDURE — 87040 BLOOD CULTURE FOR BACTERIA: CPT

## 2021-10-29 PROCEDURE — 97116 GAIT TRAINING THERAPY: CPT

## 2021-10-29 RX ORDER — POTASSIUM CHLORIDE 20 MEQ
40 PACKET (EA) ORAL DAILY
Refills: 0 | Status: DISCONTINUED | OUTPATIENT
Start: 2021-10-29 | End: 2021-10-29

## 2021-10-29 RX ORDER — POTASSIUM CHLORIDE 20 MEQ
40 PACKET (EA) ORAL ONCE
Refills: 0 | Status: COMPLETED | OUTPATIENT
Start: 2021-10-29 | End: 2021-10-29

## 2021-10-29 RX ORDER — ISOSORBIDE MONONITRATE 60 MG/1
3 TABLET, EXTENDED RELEASE ORAL
Qty: 90 | Refills: 0
Start: 2021-10-29 | End: 2021-11-27

## 2021-10-29 RX ORDER — ISOSORBIDE MONONITRATE 60 MG/1
90 TABLET, EXTENDED RELEASE ORAL DAILY
Refills: 0 | Status: DISCONTINUED | OUTPATIENT
Start: 2021-10-29 | End: 2021-10-29

## 2021-10-29 RX ADMIN — Medication 100 MILLIGRAM(S): at 12:45

## 2021-10-29 RX ADMIN — FAMOTIDINE 20 MILLIGRAM(S): 10 INJECTION INTRAVENOUS at 12:46

## 2021-10-29 RX ADMIN — LAMOTRIGINE 100 MILLIGRAM(S): 25 TABLET, ORALLY DISINTEGRATING ORAL at 12:45

## 2021-10-29 RX ADMIN — PREGABALIN 1000 MICROGRAM(S): 225 CAPSULE ORAL at 12:46

## 2021-10-29 RX ADMIN — ARIPIPRAZOLE 30 MILLIGRAM(S): 15 TABLET ORAL at 12:47

## 2021-10-29 RX ADMIN — Medication 60 MILLIGRAM(S): at 05:26

## 2021-10-29 RX ADMIN — ISOSORBIDE MONONITRATE 90 MILLIGRAM(S): 60 TABLET, EXTENDED RELEASE ORAL at 12:45

## 2021-10-29 RX ADMIN — Medication 150 MILLIGRAM(S): at 12:46

## 2021-10-29 RX ADMIN — APIXABAN 5 MILLIGRAM(S): 2.5 TABLET, FILM COATED ORAL at 05:27

## 2021-10-29 RX ADMIN — AMLODIPINE BESYLATE 10 MILLIGRAM(S): 2.5 TABLET ORAL at 05:26

## 2021-10-29 RX ADMIN — BUDESONIDE AND FORMOTEROL FUMARATE DIHYDRATE 2 PUFF(S): 160; 4.5 AEROSOL RESPIRATORY (INHALATION) at 17:42

## 2021-10-29 RX ADMIN — Medication 1 MILLIGRAM(S): at 12:46

## 2021-10-29 RX ADMIN — Medication 60 MILLIGRAM(S): at 17:41

## 2021-10-29 RX ADMIN — Medication 40 MILLIEQUIVALENT(S): at 08:32

## 2021-10-29 RX ADMIN — BUDESONIDE AND FORMOTEROL FUMARATE DIHYDRATE 2 PUFF(S): 160; 4.5 AEROSOL RESPIRATORY (INHALATION) at 05:27

## 2021-10-29 RX ADMIN — APIXABAN 5 MILLIGRAM(S): 2.5 TABLET, FILM COATED ORAL at 17:42

## 2021-10-29 RX ADMIN — Medication 0.2 MILLIGRAM(S): at 05:27

## 2021-10-29 RX ADMIN — Medication 0.2 MILLIGRAM(S): at 17:41

## 2021-10-29 RX ADMIN — Medication 100 MILLIGRAM(S): at 05:27

## 2021-10-29 RX ADMIN — Medication 81 MILLIGRAM(S): at 12:45

## 2021-10-29 NOTE — PROGRESS NOTE ADULT - PROBLEM SELECTOR PLAN 2
- Pt presented with /96 --- unclear if pt is compliant with his medications as he does not appear to have a good understanding of his medical conditions --- Psych, Dr. Winkler, consulted for assessment of capacity- ok to discharge from psych standpoint   - No vision changes, headache, ARIELLA, current CP, ECHO done.  - C/w with home antihypertensives:   - Lasix 60 mg 2x day  -  Continue clonidine 0.2  2x day  - On hydralazine 100 mg q 8 hrs   - continue amlodipine 10 mg qd  -Cardura 8 mg daily  - Increase   Imdur 60 mg Qd   -- STOP nifedipine XL  - Cardio, Dr. Bliss's group, following. - Pt presented with /96 --- unclear if pt is compliant with his medications as he does not appear to have a good understanding of his medical conditions --- Psych, Dr. Winkler, consulted for assessment of capacity- ok to discharge from psych standpoint   - No vision changes, headache, ARIELLA, current CP, ECHO done on last admission  - C/w with home antihypertensives:   - Lasix 60 mg 2x day PO  -  Continue clonidine 0.2  2x day  - On hydralazine 100 mg q 8 hrs   - continue amlodipine 10 mg qd  -Cardura 8 mg daily  - Increase   Imdur 90  mg Qd   -- STOP nifedipine XL  - Cardio, Dr. Bliss's group, following.D/W Dr Posey today

## 2021-10-29 NOTE — PROGRESS NOTE ADULT - ASSESSMENT
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

## 2021-10-29 NOTE — PROGRESS NOTE ADULT - PROBLEM SELECTOR PROBLEM 2
Hypertensive urgency

## 2021-10-29 NOTE — PROGRESS NOTE ADULT - ASSESSMENT
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 Cardiology consulted for SOB and chest pain    Pleural effusion  - Presented with SOB.  - CT chest showed small B/L pleural effusions and multifocal Pna s/p Abx.  Has been on RA  - ECHO LVEF 55-60%. LV diastolic function cannot determine due to atrial fibrillation. Mild mitral regurgitation is present. Mild aortic stenosis is  present. Mild tricuspid regurgitation is present . No evidence of any pulmonary hypertension  - BNP: 15370 S/P Lasix IV 60mg Q12, now euvolemic.  Would continue PO Lasix 60 mg BID.  No O2 requirement  - Creatinine trend remains stable    Chest pain (Atypical)   - Patient with hx of CAD s/p stents  - EKG showed Afib, no ischemic changes noted  - Mild troponin leak likely in setting of ARIELLA, peaked and trended down  - No clinical evidence of ACS  - Continue with ASA and statin     Afib  - Rate-controlled at 60'70's.  Not on any AVN blocker  - Continue Eliquis  - Monitor electrolytes, replete to keep K>4 and Mag>2    Hypertensive Urgency   - Presented with /90 s/p Hydralazine in ED 10 mg IV X2 doses  - BP improved, though remains elevated  SBP 140s-160  - Continue Hydralazine 100 mg q8H  - Continue Norvasc 10 mg   - Continue Imdur 60 and Clonidine 0.2.   - Increase Imdur to 90 daily     - All other medical needs as per primary team.  - Other cardiovascular workup will depend on clinical course.  - Will continue to follow.    Shilpa Kirkpatrick DNP, NP-C  Cardiology   Spectra #[phone]

## 2021-10-29 NOTE — PROGRESS NOTE ADULT - REASON FOR ADMISSION
hypertensive urgency
shortness of breath
shortness of breath
CHF exacerbation and HTN urgency
hypertensive urgency
hypertensive urgency, chf

## 2021-10-29 NOTE — PROGRESS NOTE ADULT - PROBLEM SELECTOR PLAN 5
- chronic- tele  - EKG: HR: 74, Atrial fibrillation, Incomplete right bundle branch block, Nonspecific T wave abnormality, Prolonged QT(461)  - off digoxin and labetalol since last admission 2/2 Bradycardia & Pauses -- remains rate controlled  - AC with Eliquis 5mg BID   - Cardiology, Dr. Bliss's, group following. - CKD 3-4- Cr stable   - baseline Cr appears to be 1.6 - 2.3  - Cr on admission 2.2; improved from 2.5 on 10/16  - Avoid nephrotoxic medications  - Trend BMP  - Nephro, Dr. Rodarte following - Lasix 60mg PO BID.

## 2021-10-29 NOTE — PROGRESS NOTE ADULT - PROBLEM SELECTOR PLAN 10
- chronic  - c/w statin.

## 2021-10-29 NOTE — PROGRESS NOTE ADULT - SUBJECTIVE AND OBJECTIVE BOX
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 Patient states his SOB started on Sunday and has been worse with laying down. Does admit to intermittent CP associated with the SOB and also when getting imaging as that makes him anxious. Does not appear to have a clear understanding of his medical conditions. Also, states he had some bleeding in his mouth on Sunday, but there is no longer bleeding; on exam there is a healing cut on his tongue and inner right cheek with no active bleeding likely from the pt biting his tongue. Admits difficulty laying down straight due to SOB. Denies fevers, chills, abd pain, n/v, sore throat, cough, palpitations  Of note, pt was recently admitted to [facility] on 10/7/21 for hypertensive urgency, Afib and  findings of PNA. was Rxed with IV Zosyn x 7 days  Abx .pt also got 1 u pRBC transfusion past week.    ED Course:   Vitals: BP: 210/96-->181/84, HR: 79, Temp: 98 F, RR: 17, SpO2: 100% on RA-->89% on RA-->98% on 2L NC   Labs: H/H: 9.6/30.6, aPTT: 43, PT/INR: 18.9/1.65, BUN/Cr: 28/2.2, alb: 3.1, GFR: 28, procal: .16, trop: .059, proBNP:10,846  Imaging:   CxR:  Bilateral lung parenchymal airspace opacities, increased from prior. Small pleural effusions suggested  B/l LE Dopplers: No evidence of deep venous thrombosis in either lower extremity.  Bilateral popliteal fossa fluid collections, likely Baker's cysts.  EKG: HR: 74, Atrial fibrillation, Incomplete right bundle branch block, Nonspecific T wave abnormality, Prolonged QT(461)  Received in the ED: Vanco and Zosyn x2, IV lasix 60mg, 10mg Hydralazine IVP x2 (18 Oct 2021 17:24)    INTERVAL HPI:  10/19/21: Patient was seen and examined at bedside. Denies any complaints. States his shortness of breath has improved.   10/20/21: Patient was seen and examined at bedside. States he was not able to sleep well because of another patient screaming across the room. Otherwise denies worsening SOB, chest pain, palpitations.  10/21/21: Patient was seen and examined at bedside. Patient comfortably having breakfast in bed, alert and awake. States he has no further complaints. Stable Labs Low H/H . Cr stable.  10/22/21: No acute events overnight. Patient seen and examined at bedside. Patient denies any fever, chills, chest pain, dyspnea, abdominal pain, leg pain/swelling, dysuria, constipation, diarhea. Needs TB screening. PPD to be placed.  COVID test prior to discharge stabel labs   10/23/21: Patient was seen and examined at bedside. No acute events overnight. Patient complaining of atypical chest pain and palpitations. STAT ekg ordered- unchanged from prior. Ordered cardiac enzymes- will follow up. Ordered Cardizem 2.5mg x 1. No ACS  10/24/21: Patient was seen and examined at bedside. Denied of any acute complaints. States he had SOB overnight and was put on NC but respiratory status improved and patient was put on RA shortly after.   10/25/21: Pt seen, Examined, NO Complaints, pt is stable for d/c , DR Winkler follow up, NEG PPD -0MM induration, COVID PCR -NEG   10/26/21: Patient seen and examined at bedside. No overnight events occurred. Patient complaining of mild R sided abdominal pain that comes and goes. Denies fevers, chills, headache, chest pain, dyspnea, abdominal pain, n/v/d/c. Pt complaining of occasional dysuria - says he has to work harder to push out urine on occasion. OOB to chair.  10/27/21:  Patient seen and examined at bedside. No overnight events occurred. Patient has no complaints at this time. Denies fevers, chills, headache, chest pain, dyspnea, abdominal pain, n/v/d/c. OOB to chair, having BMs, No Complaints.  10/28/21: Patient seen and examined at bedside. Pt had elevated BPs overnight that responded to AM BP meds. Patient has no complaints at this time. He says he does not want to be examined because "breakfast is coming soon." Denies fevers, headache, chest pain, dyspnea, abdominal pain, n/v/d/c. Reports feeling cold this morning  10/29/21: Patient seen and examined at bedside. No overnight events occurred. Patient has no complaints at this time. Denies fevers, chills, headache, chest pain, dyspnea, abdominal pain, n/v/d/c, dysuria. Having BMs, OOB to chair.      OVERNIGHT EVENTS: No acute events.    Home Medications:      MEDICATIONS  (STANDING):  amLODIPine   Tablet 10 milliGRAM(s) Oral daily  apixaban 5 milliGRAM(s) Oral every 12 hours  ARIPiprazole 30 milliGRAM(s) Oral daily  aspirin enteric coated 81 milliGRAM(s) Oral daily  atorvastatin 10 milliGRAM(s) Oral at bedtime  budesonide 160 MICROgram(s)/formoterol 4.5 MICROgram(s) Inhaler 2 Puff(s) Inhalation two times a day  cloNIDine 0.2 milliGRAM(s) Oral every 12 hours  cyanocobalamin 1000 MICROGram(s) Oral daily  doxazosin 8 milliGRAM(s) Oral at bedtime  famotidine    Tablet 20 milliGRAM(s) Oral daily  folic acid 1 milliGRAM(s) Oral daily  furosemide    Tablet 60 milliGRAM(s) Oral two times a day  hydrALAZINE 100 milliGRAM(s) Oral three times a day  isosorbide   mononitrate ER Tablet (IMDUR) 60 milliGRAM(s) Oral daily  lamoTRIgine 100 milliGRAM(s) Oral daily  mirtazapine 30 milliGRAM(s) Oral at bedtime  polyethylene glycol 3350 17 Gram(s) Oral daily  venlafaxine XR. 150 milliGRAM(s) Oral daily    MEDICATIONS  (PRN):  acetaminophen     Tablet .. 650 milliGRAM(s) Oral every 6 hours PRN Mild Pain (1 - 3)  guaiFENesin  milliGRAM(s) Oral every 12 hours PRN Cough      Allergies    No Known Allergies    Intolerances        Social History:  Tobacco: quit in 1980, not active smoker  EtOH: denies   Recreational drug use: cocaine several years ago "a few times" has not used in "many years"  Lives with: [facility] house; group home; no nursing assistance; observation is there  Ambulates: independent, denies walker or cane but uses guard railings  ADLs: independent, but states need for assistance in bathing, cooking; independent with feeding, ambulating  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[place]  Vaccinations: Moderna x2 doses last dose in May (18 Oct 2021 17:24)      REVIEW OF SYSTEMS:  CONSTITUTIONAL: No fever, [ x ] chills, No fatigue, No myalgia, No Body ache, No Weakness  EYES: No eye pain,  No visual disturbances, No discharge, NO Redness  ENMT:  No ear pain, No nose bleed, No vertigo; No sinus pain, NO throat pain, No Congestion  NECK: No pain, No stiffness  RESPIRATORY: No cough, NO wheezing, No  hemoptysis, NO  shortness of breath  CARDIOVASCULAR: No chest pain, palpitations  GASTROINTESTINAL: No abdominal pain, NO epigastric pain. No nausea, No vomiting; No diarrhea, No constipation. [ x ] BM  GENITOURINARY: No dysuria, No frequency, No urgency, No hematuria, NO incontinence  NEUROLOGICAL: No headaches, No dizziness, No numbness, No tingling, No tremors, No weakness  EXT: No Swelling, No Pain, No Edema  SKIN:  [  ] No itching, burning, rashes, or lesions   MUSCULOSKELETAL: No joint pain ,No Jt swelling; No muscle pain, No back pain, No extremity pain  PSYCHIATRIC: No depression,  No anxiety,  No mood swings ,No difficulty sleeping at night  PAIN SCALE: [ x ] None  [  ] Other-  ROS Unable to obtain due to - [  ] Dementia  [  ] Lethargy [  ] Drowsy [  ] Sedated [  ] non verbal  REST OF REVIEW Of SYSTEM - [ x ] Normal     Vital Signs Last 24 Hrs  T(C): 36.6 (29 Oct 2021 04:51), Max: 36.7 (28 Oct 2021 20:17)  T(F): 97.8 (29 Oct 2021 04:51), Max: 98 (28 Oct 2021 20:17)  HR: 71 (29 Oct 2021 04:51) (67 - 74)  BP: 149/73 (29 Oct 2021 04:51) (149/73 - 185/85)  BP(mean): --  RR: 18 (29 Oct 2021 04:51) (17 - 18)  SpO2: 93% (29 Oct 2021 04:51) (93% - 97%)  Finger Stick        10-28 @ 07:01  -  10-29 @ 07:00  --------------------------------------------------------  IN: 660 mL / OUT: 300 mL / NET: 360 mL        PHYSICAL EXAM: REFUSING FULL EXAM 10/29 "I had one already"  GENERAL:  [ x ] NAD , [ x ] well appearing, [  ] Agitated, [  ] Drowsy,  [  ] Lethargy, [  ] confused   HEAD:  [ x ] Normal, [  ] Other  EYES:  [ x ] EOMI, [  ] PERRLA, [  ] conjunctiva and sclera clear normal, [  ] Other,  [  ] Pallor,[  ] Discharge  ENMT:  [  ] Normal, [  ] Moist mucous membranes, [  ] Good dentition, [  ] No Thrush  NECK:  [  ] Supple, [  ] No JVD, [  ] Normal thyroid, [  ] Lymphadenopathy [  ] Other  CHEST/LUNG:  [  ] Clear to auscultation bilaterally, [  ] Breath Sounds equal B/L / Decrease, [  ] poor effort  [  ] No rales, [  ] No rhonchi  [  ]  No wheezing,   HEART:  [  ] Regular rate and rhythm, [  ] tachycardia, [  ] Bradycardia,  [  ] irregular  [  ] No murmurs, No rubs, No gallops, [  ] PPM in place (Mfr:  )  ABDOMEN:  [  ] Soft, [  ] Nontender, [  ] Nondistended, [  ] No mass, [  ] Bowel sounds present, [ x ] obese  NERVOUS SYSTEM:  [ x ] Alert & Oriented X3, [  ] Nonfocal  [  ] Confusion  [  ] Encephalopathic [  ] Sedated [  ] Unable to assess, [  ] Dementia [  ] Other-  EXTREMITIES: [  ] 2+ Peripheral Pulses, No clubbing, No cyanosis,  [  ] edema B/L lower EXT. [  ] PVD stasis skin changes B/L Lower EXT, [  ] wound  LYMPH: No lymphadenopathy noted  SKIN:  [  ] No rashes or lesions, [  ] Pressure Ulcers, [  ] ecchymosis, [  ] Skin Tears, [  ] Other    DIET: Diet, Dysphagia 3 Soft-Thin Liquids:   Consistent Carbohydrate No Snacks  Low Sodium (10-24-21 @ 11:36)      LABS:                        9.2    6.86  )-----------( 275      ( 29 Oct 2021 07:08 )             29.8     29 Oct 2021 07:08    142    |  108    |  44     ----------------------------<  202    3.3     |  24     |  2.50     Ca    9.2        29 Oct 2021 07:08                    CARDIAC MARKERS ( 23 Oct 2021 14:13 )  .022 ng/mL / x     / 40 U/L / x     / 1.4 ng/mL  CARDIAC MARKERS ( 23 Oct 2021 09:42 )  .034 ng/mL / x     / 44 U/L / x     / 1.6 ng/mL             Anemia Panel:      Thyroid Panel:                RADIOLOGY & ADDITIONAL TESTS:      HEALTH ISSUES - PROBLEM Dx:  Acute exacerbation of CHF (congestive heart failure)    Hypertensive urgency    HLD (hyperlipidemia)    CAD (coronary artery disease)  s/p stents (not on plavix)    Type 2 diabetes mellitus  not on home insulin    Atrial fibrillation  first documented on EKG 10/7/2021    Depression with anxiety    Pneumonia    DVT prophylaxis    Stage 3 chronic kidney disease    Anemia    Elevated troponin            Consultant(s) Notes Reviewed:  [  ] YES     Care Discussed with [X] Consultants  [ x ] Patient  [  ] Family [  ] HCP [  ]   [ x ] Social Service  [ x ] RN, [  ] Physical Therapy,[  ] Palliative care team  DVT PPX: [  ] Lovenox, [  ] S C Heparin, [  ] Coumadin, [  ] Xarelto, [ x ] Eliquis, [  ] Pradaxa, [  ] IV Heparin drip, [  ] SCD [  ] Contraindication 2 to GI Bleed,[  ] Ambulation [  ] Contraindicated 2 to  bleed [  ] Contraindicated 2 to Brain Bleed  Advanced directive: [ x ] None, [  ] DNR/DNI Patient is a 76y old  Male who presents with a chief complaint of hypertensive urgency (28 Oct 2021 10:47)    HPI:  75 yo M with PMHx of Type 2 DM (not on insulin), BPH, CAD s/p stents >4 years ago (not on plavix), diverticulitis (hospitalized 07/2020 at [facility]), GIB 2/2 diverticulosis (2020), anxiety, Lupus, HTN, HLD, CKD stage 3, HepC, 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 Patient states his SOB started on Sunday and has been worse with laying down. Does admit to intermittent CP associated with the SOB and also when getting imaging as that makes him anxious. Does not appear to have a clear understanding of his medical conditions. Also, states he had some bleeding in his mouth on Sunday, but there is no longer bleeding; on exam there is a healing cut on his tongue and inner right cheek with no active bleeding likely from the pt biting his tongue. Admits difficulty laying down straight due to SOB. Denies fevers, chills, abd pain, n/v, sore throat, cough, palpitations  Of note, pt was recently admitted to [facility] on 10/7/21 for hypertensive urgency, Afib and  findings of PNA. was Rxed with IV Zosyn x 7 days  Abx .pt also got 1 u pRBC transfusion past week.    ED Course:   Vitals: BP: 210/96-->181/84, HR: 79, Temp: 98 F, RR: 17, SpO2: 100% on RA-->89% on RA-->98% on 2L NC   Labs: H/H: 9.6/30.6, aPTT: 43, PT/INR: 18.9/1.65, BUN/Cr: 28/2.2, alb: 3.1, GFR: 28, procal: .16, trop: .059, proBNP:10,846  Imaging:   CxR:  Bilateral lung parenchymal airspace opacities, increased from prior. Small pleural effusions suggested  B/l LE Dopplers: No evidence of deep venous thrombosis in either lower extremity.  Bilateral popliteal fossa fluid collections, likely Baker's cysts.  EKG: HR: 74, Atrial fibrillation, Incomplete right bundle branch block, Nonspecific T wave abnormality, Prolonged QT(461)  Received in the ED: Vanco and Zosyn x2, IV lasix 60mg, 10mg Hydralazine IVP x2 (18 Oct 2021 17:24)    INTERVAL HPI:  10/19/21: Patient was seen and examined at bedside. Denies any complaints. States his shortness of breath has improved.   10/20/21: Patient was seen and examined at bedside. States he was not able to sleep well because of another patient screaming across the room. Otherwise denies worsening SOB, chest pain, palpitations.  10/21/21: Patient was seen and examined at bedside. Patient comfortably having breakfast in bed, alert and awake. States he has no further complaints. Stable Labs Low H/H . Cr stable.  10/22/21: No acute events overnight. Patient seen and examined at bedside. Patient denies any fever, chills, chest pain, dyspnea, abdominal pain, leg pain/swelling, dysuria, constipation, diarhea. Needs TB screening. PPD to be placed.  COVID test prior to discharge stabel labs   10/23/21: Patient was seen and examined at bedside. No acute events overnight. Patient complaining of atypical chest pain and palpitations. STAT ekg ordered- unchanged from prior. Ordered cardiac enzymes- will follow up. Ordered Cardizem 2.5mg x 1. No ACS  10/24/21: Patient was seen and examined at bedside. Denied of any acute complaints. States he had SOB overnight and was put on NC but respiratory status improved and patient was put on RA shortly after.   10/25/21: Pt seen, Examined, NO Complaints, pt is stable for d/c , DR Winkler follow up, NEG PPD -0MM induration, COVID PCR -NEG   10/26/21: Patient seen and examined at bedside. No overnight events occurred. Patient complaining of mild R sided abdominal pain that comes and goes. Denies fevers, chills, headache, chest pain, dyspnea, abdominal pain, n/v/d/c. Pt complaining of occasional dysuria - says he has to work harder to push out urine on occasion. OOB to chair.  10/27/21:  Patient seen and examined at bedside. No overnight events occurred. Patient has no complaints at this time. Denies fevers, chills, headache, chest pain, dyspnea, abdominal pain, n/v/d/c. OOB to chair, having BMs, No Complaints.  10/28/21: Patient seen and examined at bedside. Pt had elevated BPs overnight that responded to AM BP meds. Patient has no complaints at this time. He says he does not want to be examined because "breakfast is coming soon." Denies fevers, headache, chest pain, dyspnea, abdominal pain, n/v/d/c. Reports feeling cold this morning  10/29/21: Patient seen and examined at bedside. No overnight events occurred. Patient has no complaints at this time. Denies fevers, chills, headache, chest pain, dyspnea, abdominal pain, n/v/d/c, dysuria. Having BMs, OOB to chair. Case & care plan d/w Dr Alfonso [phone] at [facility],(  [phone]) , i have raised my concern with pt & Group home unable to manage pt's medication that has caused pt to return to ED & multiple hospital readmissions with similar Cardiac S/S , As per Dr Winkler Pt has No capacity to make medical decisions & unable to manage his meds. Case D.[facility] social work , Pt is being d/c ed back to group home. Meds d/w Cardiology NP.nadya for d/c today.      OVERNIGHT EVENTS: No acute events.    Home Medications:      MEDICATIONS  (STANDING):  amLODIPine   Tablet 10 milliGRAM(s) Oral daily  apixaban 5 milliGRAM(s) Oral every 12 hours  ARIPiprazole 30 milliGRAM(s) Oral daily  aspirin enteric coated 81 milliGRAM(s) Oral daily  atorvastatin 10 milliGRAM(s) Oral at bedtime  budesonide 160 MICROgram(s)/formoterol 4.5 MICROgram(s) Inhaler 2 Puff(s) Inhalation two times a day  cloNIDine 0.2 milliGRAM(s) Oral every 12 hours  cyanocobalamin 1000 MICROGram(s) Oral daily  doxazosin 8 milliGRAM(s) Oral at bedtime  famotidine    Tablet 20 milliGRAM(s) Oral daily  folic acid 1 milliGRAM(s) Oral daily  furosemide    Tablet 60 milliGRAM(s) Oral two times a day  hydrALAZINE 100 milliGRAM(s) Oral three times a day  isosorbide   mononitrate ER Tablet (IMDUR) 60 milliGRAM(s) Oral daily  lamoTRIgine 100 milliGRAM(s) Oral daily  mirtazapine 30 milliGRAM(s) Oral at bedtime  polyethylene glycol 3350 17 Gram(s) Oral daily  venlafaxine XR. 150 milliGRAM(s) Oral daily    MEDICATIONS  (PRN):  acetaminophen     Tablet .. 650 milliGRAM(s) Oral every 6 hours PRN Mild Pain (1 - 3)  guaiFENesin  milliGRAM(s) Oral every 12 hours PRN Cough      Allergies    No Known Allergies    Intolerances        Social History:  Tobacco: quit in 1980, not active smoker  EtOH: denies   Recreational drug use: cocaine several years ago "a few times" has not used in "many years"  Lives with: [facility]; group home; no nursing assistance; observation is there  Ambulates: independent, denies walker or cane but uses guard railings  ADLs: independent, but states need for assistance in bathing, cooking; independent with feeding, ambulating  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  CONSTITUTIONAL: No fever, [ x ] chills, No fatigue, No myalgia, No Body ache, No Weakness  EYES: No eye pain,  No visual disturbances, No discharge, NO Redness  ENMT:  No ear pain, No nose bleed, No vertigo; No sinus pain, NO throat pain, No Congestion  NECK: No pain, No stiffness  RESPIRATORY: No cough, NO wheezing, No  hemoptysis, NO  shortness of breath  CARDIOVASCULAR: No chest pain, palpitations  GASTROINTESTINAL: No abdominal pain, NO epigastric pain. No nausea, No vomiting; No diarrhea, No constipation. [ x ] BM  GENITOURINARY: No dysuria, No frequency, No urgency, No hematuria, NO incontinence  NEUROLOGICAL: No headaches, No dizziness, No numbness, No tingling, No tremors, No weakness  EXT: No Swelling, No Pain, No Edema  SKIN:  [x  ] No itching, burning, rashes, or lesions   MUSCULOSKELETAL: No joint pain ,No Jt swelling; No muscle pain, No back pain, No extremity pain  PSYCHIATRIC: No depression,  No anxiety,  No mood swings ,No difficulty sleeping at night  PAIN SCALE: [ x ] None  [  ] Other-  ROS Unable to obtain due to - [  ] Dementia  [  ] Lethargy [  ] Drowsy [  ] Sedated [  ] non verbal  REST OF REVIEW Of SYSTEM - [ x ] Normal     Vital Signs Last 24 Hrs  T(C): 36.6 (29 Oct 2021 04:51), Max: 36.7 (28 Oct 2021 20:17)  T(F): 97.8 (29 Oct 2021 04:51), Max: 98 (28 Oct 2021 20:17)  HR: 71 (29 Oct 2021 04:51) (67 - 74)  BP: 149/73 (29 Oct 2021 04:51) (149/73 - 185/85)  BP(mean): --  RR: 18 (29 Oct 2021 04:51) (17 - 18)  SpO2: 93% (29 Oct 2021 04:51) (93% - 97%)  Finger Stick        10-28 @ 07:01  -  10-29 @ 07:00  --------------------------------------------------------  IN: 660 mL / OUT: 300 mL / NET: 360 mL        PHYSICAL EXAM: REFUSING FULL EXAM 10/29 "I had one already"  GENERAL:  [ x ] NAD , [ x ] well appearing, [  ] Agitated, [  ] Drowsy,  [  ] Lethargy, [  ] confused   HEAD:  [ x ] Normal, [  ] Other  EYES:  [ x ] EOMI, [ x ] PERRLA, [  ] conjunctiva and sclera clear normal, [  ] Other,  [ x ] Pallor,[  ] Discharge  ENMT:  [ x ] Normal, [ x ] Moist mucous membranes, [ x ] Good dentition, [x  ] No Thrush  NECK:  [ x ] Supple, [ x] No JVD, [x ] Normal thyroid, [  ] Lymphadenopathy [  ] Other  CHEST/LUNG:  [ x ] Clear to auscultation bilaterally, [ x ] Breath Sounds equal B/L / Decrease, [ x ] poor effort  [ x ] No rales, [x  ] No rhonchi  [ x ]  No wheezing,   HEART:  [  ] Regular rate and rhythm, [  ] tachycardia, [  ] Bradycardia,  [ x ] irregular  [ x ] No murmurs, No rubs, No gallops, [  ] PPM in place (Mfr:  )  ABDOMEN:  [x ] Soft, [ x ] Nontender, [ x ] Nondistended, [x  ] No mass, [ x ] Bowel sounds present, [ x ] obese  NERVOUS SYSTEM:  [ x ] Alert & Oriented X3, [ x ] Nonfocal  [  ] Confusion  [  ] Encephalopathic [  ] Sedated [  ] Unable to assess, [  ] Dementia [  ] Other-  EXTREMITIES: [  ] 2+ Peripheral Pulses, No clubbing, No cyanosis,  [  ] edema B/L lower EXT. [  ] PVD stasis skin changes B/L Lower EXT, [  ] wound  LYMPH: No lymphadenopathy noted  SKIN:  [  ] No rashes or lesions, [  ] Pressure Ulcers, [  ] ecchymosis, [  ] Skin Tears, [ x ] Other-Tattoos on all 4 Ext    DIET: Diet, Dysphagia 3 Soft-Thin Liquids:   Consistent Carbohydrate No Snacks  Low Sodium (10-24-21 @ 11:36)      LABS:                        9.2    6.86  )-----------( 275      ( 29 Oct 2021 07:08 )             29.8     29 Oct 2021 07:08    142    |  108    |  44     ----------------------------<  202    3.3     |  24     |  2.50     Ca    9.2        29 Oct 2021 07:08      CARDIAC MARKERS ( 23 Oct 2021 14:13 )  .022 ng/mL / x     / 40 U/L / x     / 1.4 ng/mL  CARDIAC MARKERS ( 23 Oct 2021 09:42 )  .034 ng/mL / x     / 44 U/L / x     / 1.6 ng/mL      RADIOLOGY & ADDITIONAL TESTS:      HEALTH ISSUES - PROBLEM Dx:  Acute exacerbation of CHF (congestive heart failure)    Hypertensive urgency    HLD (hyperlipidemia)    CAD (coronary artery disease)  s/p stents (not on plavix)    Type 2 diabetes mellitus  not on home insulin    Atrial fibrillation  first documented on EKG 10/7/2021    Depression with anxiety    Pneumonia    DVT prophylaxis    Stage 3 chronic kidney disease    Anemia    Elevated troponin        Consultant(s) Notes Reviewed:  [x  ] YES     Care Discussed with [X] Consultants  [ x ] Patient  [  ] Family [  ] HCP [  ]   [ x ] Social Service  [ x ] RN, [  ] Physical Therapy,[  ] Palliative care team  DVT PPX: [  ] Lovenox, [  ] S C Heparin, [  ] Coumadin, [  ] Xarelto, [ x ] Eliquis, [  ] Pradaxa, [  ] IV Heparin drip, [  ] SCD [  ] Contraindication 2 to GI Bleed,[  ] Ambulation [  ] Contraindicated 2 to  bleed [  ] Contraindicated 2 to Brain Bleed  Advanced directive: [ x ] None, [  ] DNR/DNI

## 2021-10-29 NOTE — PROGRESS NOTE ADULT - ATTENDING COMMENTS
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 Pt was already Rxed for PNA with IV Zosyn last week,  Admitted for ac on Chronic Diastolic  CHF exacerbation and HTN urgency.  Pt seen, Examined, case & care plan d/w pt, residents at detail.  -Concern for NON Compliance with meds /supervision at [facility].  Pulmonary-Dr Day follow up.  Cardio-follow up -D/W  today, increase Imdur 90 mg daily  Psych-DR Winkler d/w at detail, patient appears to have limited insight into medication , No capacity   PT Eval.---> NO PT need  Social service Eval. d/w at detail, pt to return back to group home with Visiting nurse for medication education to staff,  I have d/w proffered pharmacy today [phone] at detail, ALL Medication reviewed with dosage & frequency , as per Pharmacy Meds were delivered & will also deliver Imdur today. with change in  dose.  Total  d/c care time is 60 minutes.   I have discussed & raised my concern for pt's care at his [facility] & staff & pt unable to manage pt's meds causing pt to return to ER & Hospital readmissions multiple times. I Have d/w ACT team staff- Vivian Montoya Dave at [facility],  & Medical director  DR Alfonso [phone] today at detail.  d/w social work about concern for pt's readmission back to Hospital,

## 2021-10-29 NOTE — PROGRESS NOTE ADULT - PROBLEM SELECTOR PLAN 4
- troponin on admission 0.059-H/O CAD with Stents   - likely 2/2 demand ischemia; suspect now resolved intermitted CP 2/2 anxiety as it occurred when pt was having imaging done and pt admitted to feeling anxious at that time  - will trend troponin to peak  - EKG: HR: 74, Atrial fibrillation, Incomplete right bundle branch block, Nonspecific T wave abnormality, Prolonged QT(461) -- caution with Qtc prolonging agents   - cardio, Dr. Bliss's group, following  -On EC ASA 81 mg daily  NO BB 2/2 Bradycardia. - troponin on admission 0.059-H/O CAD with Stents -NO ACS   - likely 2/2 demand ischemia; suspect now resolved intermitted CP 2/2 anxiety as it occurred when pt was having imaging done and pt admitted to feeling anxious at that time  - will trend troponin to peak  - EKG: HR: 74, Atrial fibrillation, Incomplete right bundle branch block, Nonspecific T wave abnormality, Prolonged QT(461) -- caution with Qtc prolonging agents   - cardio, Dr. Bliss's group, following  -On EC ASA 81 mg daily, statin daily   NO BB 2/2 Bradycardia.

## 2021-10-29 NOTE — PROGRESS NOTE ADULT - ATTENDING SUPERVISION STATEMENT
ACP
ACP/Resident

## 2021-10-29 NOTE — PROGRESS NOTE ADULT - PROBLEM SELECTOR PLAN 9
- chronic  - CAD s/p stents >4 years ago (not on plavix)  - c/w aspirin. - History of T2DM -- diet controlled , No meds  - HbA1C- 6.3 on last admission (10/8)  - started on carb consistent diet  - can start ISS if BG readings are elevated; BG was controlled on previous admission.

## 2021-10-29 NOTE — CHART NOTE - NSCHARTNOTEFT_GEN_A_CORE
Called by RN for patient with elevated manual blood pressure 188/88 at 5am. Patient has severely refractory blood pressure requiring high doses of numerous medications. Patient is asymptomatic, resting comfortably in bed.    1. Give morning bp meds  2. reassess patient's vital signs s/p bp meds    addendum:    called by RN that patient's BP has droped to 154/71 s/p morning meds. Still asymptomatic, resting comfortably in bed. Continue to monitor. Day team to address possible adjustments to bp meds.
Called by RN to place PPD. Patient seen and examined at bedside. No current complaints. PPD placed intradermally in right forearm on anterior surface approximately 3 cm distal to antecubital space. Patient tolerated well without complications. Will read PPD in approximately 48 hrs (10/24 at 19:00). Will continue to follow, RN to call if any changes.
Called by RN to read PPD. PPD was placed 10/22/21 19:52 right forearm on anterior surface approximately 3 cm distal to antecubital space. PPD read 10/24/21 20:48, PPD is negative.
Assessment:   Pt seen for nutrition follow up.  Chart reviewed, hospital course noted.     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    Attempted to see pt at bedside, sound asleep, snoring, not disturbed.  Per flow sheets, pt eating 100%. +BM 10/25, 10/28.   Low K+ today- recommend replace.     Factors impacting intake: [ ] none [ ] nausea  [ ] vomiting [ ] diarrhea [ ] constipation  [ ]chewing problems [ ] swallowing issues  [ ] other:     Diet Prescription: Diet, Dysphagia 3 Soft-Thin Liquids:   Consistent Carbohydrate {No Snacks}  Low Sodium (10-24-21 @ 11:36)    Intake: 100%    Current Weight: 10/20 231.4#, 10/24 227#, 10/29 222#;  wt loss likely attributable to diuresis      Pertinent Medications: MEDICATIONS  (STANDING):  amLODIPine   Tablet 10 milliGRAM(s) Oral daily  apixaban 5 milliGRAM(s) Oral every 12 hours  ARIPiprazole 30 milliGRAM(s) Oral daily  aspirin enteric coated 81 milliGRAM(s) Oral daily  atorvastatin 10 milliGRAM(s) Oral at bedtime  budesonide 160 MICROgram(s)/formoterol 4.5 MICROgram(s) Inhaler 2 Puff(s) Inhalation two times a day  cloNIDine 0.2 milliGRAM(s) Oral every 12 hours  cyanocobalamin 1000 MICROGram(s) Oral daily  doxazosin 8 milliGRAM(s) Oral at bedtime  famotidine    Tablet 20 milliGRAM(s) Oral daily  folic acid 1 milliGRAM(s) Oral daily  furosemide    Tablet 60 milliGRAM(s) Oral two times a day  hydrALAZINE 100 milliGRAM(s) Oral three times a day  isosorbide   mononitrate ER Tablet (IMDUR) 60 milliGRAM(s) Oral daily  lamoTRIgine 100 milliGRAM(s) Oral daily  mirtazapine 30 milliGRAM(s) Oral at bedtime  polyethylene glycol 3350 17 Gram(s) Oral daily  venlafaxine XR. 150 milliGRAM(s) Oral daily    MEDICATIONS  (PRN):  acetaminophen     Tablet .. 650 milliGRAM(s) Oral every 6 hours PRN Mild Pain (1 - 3)  guaiFENesin  milliGRAM(s) Oral every 12 hours PRN Cough    Pertinent Labs: 10-29 Na142 mmol/L Glu 202 mg/dL<H> K+ 3.3 mmol/L<L> Cr  2.50 mg/dL<H> BUN 44 mg/dL<H> 10-27 Alb 2.8 g/dL<L> 10-07 Chol 174 mg/dL LDL --    HDL 42 mg/dL Trig 160 mg/dL<H>      Skin: no pressure injuries  Edema: none noted     Estimated Needs:   [x] no change since previous assessment on: 10/19 based on adjusted body wt of 173#      Previous Nutrition Diagnosis:     [x] Swallowing Difficulty  [x] Altered Nutrition Related Lab Values (Glu)    Nutrition Diagnosis is [x] ongoing  [ ] resolved [ ] not applicable     New Nutrition Diagnosis: [x] not applicable       Interventions:   Recommend  [x] Continue current diet  [ ] Change Diet To:  [ ] Nutrition Supplement  [ ] Nutrition Support  [ ] Other:     Monitoring and Evaluation:   [x] PO intake [ x ] Tolerance to diet prescription [ x ] weights [ x ] labs [ x ] follow up per protocol  [x] other: s/s GI distress, bowel function, skin integrity/ edema
Assessment: 77 y/o male adm with acute respiratory distress/pulmonary infiltrates, pleural effusion, RI, hypoxia, HF. PMH HF, anemia of chronic disease, Stage 3 CKD, afib, diverticulosis, anxiety, Type 2 DM, CAD, afib, HLD, HTN. Pt adm from Group home. Pt visited at bedside. Pt sleeping soundly in bed, did not disturb. Pt noted to have a very good appetite. Pt tolerating meals. MBS completed on 10/20 which rx dysphagia 3 soft diet with thin liquids. 1+ generalized edema.     Factors impacting intake: [x ] none [ ] nausea  [ ] vomiting [ ] diarrhea [ ] constipation  [ ]chewing problems [ ] swallowing issues  [ ] other:     Diet Presciption: Diet, Dysphagia 3 Soft-Thin Liquids:   Low Sodium (10-19-21 @ 09:33)    Intake: 100%    Current Weight: 10/19 230.1# 10/24 227#  % Weight Change    Pertinent Medications: MEDICATIONS  (STANDING):  amLODIPine   Tablet 10 milliGRAM(s) Oral daily  apixaban 5 milliGRAM(s) Oral every 12 hours  ARIPiprazole 30 milliGRAM(s) Oral daily  aspirin enteric coated 81 milliGRAM(s) Oral daily  atorvastatin 10 milliGRAM(s) Oral at bedtime  budesonide 160 MICROgram(s)/formoterol 4.5 MICROgram(s) Inhaler 2 Puff(s) Inhalation two times a day  cloNIDine 0.1 milliGRAM(s) Oral three times a day  cyanocobalamin 1000 MICROGram(s) Oral daily  doxazosin 8 milliGRAM(s) Oral at bedtime  famotidine    Tablet 20 milliGRAM(s) Oral daily  folic acid 1 milliGRAM(s) Oral daily  furosemide    Tablet 60 milliGRAM(s) Oral two times a day  hydrALAZINE 100 milliGRAM(s) Oral three times a day  isosorbide   mononitrate ER Tablet (IMDUR) 30 milliGRAM(s) Oral daily  lamoTRIgine 100 milliGRAM(s) Oral daily  mirtazapine 30 milliGRAM(s) Oral at bedtime  NIFEdipine XL 60 milliGRAM(s) Oral daily  venlafaxine XR. 150 milliGRAM(s) Oral daily    MEDICATIONS  (PRN):    Pertinent Labs: 10-24 Na143 mmol/L Glu 221 mg/dL<H> K+ 3.5 mmol/L Cr  2.70 mg/dL<H> BUN 41 mg/dL<H> 10-19 Phos 4.5 mg/dL 10-24 Alb 2.7 g/dL<L> 10-07 Chol 174 mg/dL LDL --    HDL 42 mg/dL Trig 160 mg/dL<H>     CAPILLARY BLOOD GLUCOSE        Skin: no pressure ulcers noted.     Estimated Needs:   [x ] no change since previous assessment  [ ] recalculated:     Previous Nutrition Diagnosis:   [ ] Inadequate Energy Intake [ ]Inadequate Oral Intake [ ] Excessive Energy Intake   [ ] Underweight [ ] Increased Nutrient Needs [ ] Overweight/Obesity   [ ] Altered GI Function [ ] Unintended Weight Loss [ ] Food & Nutrition Related Knowledge Deficit [ ] Malnutrition   [x] swallowing difficulty     Nutrition Diagnosis is [x ] ongoing  [ ] resolved [ ] not applicable     New Nutrition Diagnosis: [ ] not applicable   [x] altered nutrition related lab values (gluc) related to endocrine dysfunction as evidenced by blood glucose level of 221.     Interventions:   Recommend  [ x] Change Diet To: dysphagia 3 soft, thin liquids, low sodium with consistent carb restriction.   [ ] Nutrition Supplement  [ ] Nutrition Support  [ x] Other: continue current diet order.     Monitoring and Evaluation:   [x ] PO intake [ x ] Tolerance to diet prescription [ x ] weights [ x ] labs[ x ] follow up per protocol  [x ] other: Nalini

## 2021-10-29 NOTE — PROGRESS NOTE ADULT - SUBJECTIVE AND OBJECTIVE BOX
[facility] -- Fifi Bliss, Kalpesh Valdovinos, Abraham Sheridan Savella, [place]  Office # [phone]    Follow Up: Hx of Afib, CAD s/p stents     Subjective/Observations: Awake and alert, remains comfortable on RA.  Denies any respiratory or cardiac discomfort.  Denies chest pain, palpitations, or any form of bleeding    REVIEW OF SYSTEMS: All other review of systems is negative unless indicated above  PAST MEDICAL & SURGICAL HISTORY:  HTN (hypertension)  c/b multiple episodes of hypertensive urgency    HLD (hyperlipidemia)    Atrial fibrillation  first documented on EKG 10/7/2021    CAD (coronary artery disease)  s/p stents (not on plavix)    Type 2 diabetes mellitus  not on home insulin/ Meds    Anxiety  Depression  multiple psych medications    History of diverticulitis  07/2021    Diverticulosis  c/b GIB in 2020    Afib  on AC    Stage 3 chronic kidney disease    Anemia of chronic disease  Monoclonal Gammopathy-MGUS  pRBC transfusion 10/15/21    Chronic diastolic congestive heart failure    Multiple thyroid nodules    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  Had surgery ( April 2013 )    S/P tonsillectomy    S/P arthroscopic knee surgery  Bilateral ( 2005 )    Torsion of testicle  Had surgery at age 14    Pilonidal cyst  Had surgery ( 1969 )    S/P cataract surgery  Bilateral    H/O hernia repair    MEDICATIONS  (STANDING):  amLODIPine   Tablet 10 milliGRAM(s) Oral daily  apixaban 5 milliGRAM(s) Oral every 12 hours  ARIPiprazole 30 milliGRAM(s) Oral daily  aspirin enteric coated 81 milliGRAM(s) Oral daily  atorvastatin 10 milliGRAM(s) Oral at bedtime  budesonide 160 MICROgram(s)/formoterol 4.5 MICROgram(s) Inhaler 2 Puff(s) Inhalation two times a day  cloNIDine 0.2 milliGRAM(s) Oral every 12 hours  cyanocobalamin 1000 MICROGram(s) Oral daily  doxazosin 8 milliGRAM(s) Oral at bedtime  famotidine    Tablet 20 milliGRAM(s) Oral daily  folic acid 1 milliGRAM(s) Oral daily  furosemide    Tablet 60 milliGRAM(s) Oral two times a day  hydrALAZINE 100 milliGRAM(s) Oral three times a day  isosorbide   mononitrate ER Tablet (IMDUR) 60 milliGRAM(s) Oral daily  lamoTRIgine 100 milliGRAM(s) Oral daily  mirtazapine 30 milliGRAM(s) Oral at bedtime  polyethylene glycol 3350 17 Gram(s) Oral daily  venlafaxine XR. 150 milliGRAM(s) Oral daily    MEDICATIONS  (PRN):  acetaminophen     Tablet .. 650 milliGRAM(s) Oral every 6 hours PRN Mild Pain (1 - 3)  guaiFENesin  milliGRAM(s) Oral every 12 hours PRN Cough    Allergies    No Known Allergies    Intolerances    Vital Signs Last 24 Hrs  T(C): 36.6 (29 Oct 2021 04:51), Max: 36.7 (28 Oct 2021 20:17)  T(F): 97.8 (29 Oct 2021 04:51), Max: 98 (28 Oct 2021 20:17)  HR: 71 (29 Oct 2021 04:51) (67 - 72)  BP: 149/73 (29 Oct 2021 04:51) (149/73 - 185/85)  BP(mean): --  RR: 18 (29 Oct 2021 04:51) (17 - 18)  SpO2: 93% (29 Oct 2021 04:51) (93% - 97%)  I&O's Summary    28 Oct 2021 07:01  -  29 Oct 2021 07:00  --------------------------------------------------------  IN: 660 mL / OUT: 300 mL / NET: 360 mL    PHYSICAL EXAM:  TELE: Not on tele  Constitutional: NAD, awake and alert, obese  HEENT: Moist Mucous Membranes, Anicteric  Pulmonary: Non-labored, breath sounds are clear bilaterally, No wheezing, rales or rhonchi  Cardiovascular: IRRR, S1 and S2, +murmurs, no rubs, gallops or clicks  Gastrointestinal: Bowel Sounds present, soft, nontender.   Lymph: No peripheral edema. No lymphadenopathy.  Skin: No visible rashes or ulcers.  Psych:  Mood & affect appropriate  LABS: All Labs Reviewed:                        9.2    6.86  )-----------( 275      ( 29 Oct 2021 07:08 )             29.8                         8.0    6.80  )-----------( 261      ( 27 Oct 2021 07:25 )             26.4     29 Oct 2021 07:08    142    |  108    |  44     ----------------------------<  202    3.3     |  24     |  2.50   27 Oct 2021 07:25    142    |  108    |  47     ----------------------------<  108    3.8     |  26     |  2.50     Ca    9.2        29 Oct 2021 07:08  Ca    9.1        27 Oct 2021 07:25    TPro  6.8    /  Alb  2.8    /  TBili  0.3    /  DBili  x      /  AST  17     /  ALT  24     /  AlkPhos  50     27 Oct 2021 07:25

## 2021-10-29 NOTE — PROGRESS NOTE ADULT - SUBJECTIVE AND OBJECTIVE BOX
Patient is a 76y old  Male who presents with a chief complaint of shortness of breath (19 Oct 2021 09:10)  Patient seen in follow up for CKD 4, fluid overload.        PAST MEDICAL HISTORY:  Hypertension    Diabetes mellitus    Lupus    Hepatitis C    Anxiety and depression    CAD (coronary artery disease)    Diverticulosis    Hyperlipidemia    HTN (hypertension)    HLD (hyperlipidemia)    Atrial fibrillation    CAD (coronary artery disease)    Type 2 diabetes mellitus    Anxiety    History of diverticulitis    Diverticulosis    Afib    Stage 3 chronic kidney disease    Anemia of chronic disease    Chronic diastolic congestive heart failure    Multiple thyroid nodules      MEDICATIONS  (STANDING):  amLODIPine   Tablet 10 milliGRAM(s) Oral daily  apixaban 5 milliGRAM(s) Oral every 12 hours  ARIPiprazole 30 milliGRAM(s) Oral daily  aspirin enteric coated 81 milliGRAM(s) Oral daily  atorvastatin 10 milliGRAM(s) Oral at bedtime  budesonide 160 MICROgram(s)/formoterol 4.5 MICROgram(s) Inhaler 2 Puff(s) Inhalation two times a day  cloNIDine 0.2 milliGRAM(s) Oral every 12 hours  cyanocobalamin 1000 MICROGram(s) Oral daily  doxazosin 8 milliGRAM(s) Oral at bedtime  famotidine    Tablet 20 milliGRAM(s) Oral daily  folic acid 1 milliGRAM(s) Oral daily  furosemide    Tablet 60 milliGRAM(s) Oral two times a day  hydrALAZINE 100 milliGRAM(s) Oral three times a day  isosorbide   mononitrate ER Tablet (IMDUR) 90 milliGRAM(s) Oral daily  lamoTRIgine 100 milliGRAM(s) Oral daily  mirtazapine 30 milliGRAM(s) Oral at bedtime  polyethylene glycol 3350 17 Gram(s) Oral daily  venlafaxine XR. 150 milliGRAM(s) Oral daily    MEDICATIONS  (PRN):  acetaminophen     Tablet .. 650 milliGRAM(s) Oral every 6 hours PRN Mild Pain (1 - 3)  guaiFENesin  milliGRAM(s) Oral every 12 hours PRN Cough    T(C): 36.7 (10-29-21 @ 12:47), Max: 36.7 (10-27-21 @ 20:07)  HR: 70 (10-29-21 @ 12:47) (64 - 74)  BP: 167/78 (10-29-21 @ 12:47) (149/73 - 191/88)  RR: 19 (10-29-21 @ 12:47)  SpO2: 93% (10-29-21 @ 12:47)  Wt(kg): --  I&O's Detail    28 Oct 2021 07:01  -  29 Oct 2021 07:00  --------------------------------------------------------  IN:    Oral Fluid: 660 mL  Total IN: 660 mL    OUT:    Voided (mL): 300 mL  Total OUT: 300 mL    Total NET: 360 mL              PHYSICAL EXAM:  General: no distress  Respiratory: b/l air entry  Cardiovascular: S1 S2  Gastrointestinal: soft  Extremities: + edema          LABORATORY:                        9.2    6.86  )-----------( 275      ( 29 Oct 2021 07:08 )             29.8     10-29    142  |  108  |  44<H>  ----------------------------<  202<H>  3.3<L>   |  24  |  2.50<H>    Ca    9.2      29 Oct 2021 07:08      Sodium, Serum: 142 mmol/L (10-29 @ 07:08)    Potassium, Serum: 3.3 mmol/L (10-29 @ 07:08)    Hemoglobin: 9.2 g/dL (10-29 @ 07:08)  Hemoglobin: 8.0 g/dL (10-27 @ 07:25)    Creatinine, Serum 2.50 (10-29 @ 07:08)  Creatinine, Serum 2.50 (10-27 @ 07:25)

## 2021-10-29 NOTE — PROGRESS NOTE ADULT - PROBLEM SELECTOR PLAN 11
- Chronic   - c/w home Mirtazapine, venlafaxin, Lamictal, will continue. - Pt was recent admitted to [facility] from 10/7-10/16 and treated for PNA with IV Zosyn & Augmentin x 7 days   - Although CxR shows bilateral lung parenchymal airspace opacities, increased from prior pt does not have leukocytosis or fever and it is suspected SOB is more so 2/2 CHF exacerbation. CxR findings are suspected to be from previously treated PNA  - s/p vanc and zosyn in ED. will hold off on further Abx at this time.as per Pulmonary Dr Day   - F/u Cultures: pending results, Nl Lactate   - official S&S: dysphagis 3, soft solids   - Pulm, Dr. Day,- Keep Off Abx , glorialey Old PNA   Continue Symbicort 2x day.

## 2021-10-29 NOTE — PROGRESS NOTE ADULT - PROBLEM SELECTOR PLAN 1
- Pt presented with SOB worse with laying down and with LE edema Since Sunday  - CxR: Bilateral lung parenchymal airspace opacities, increased from prior. Small pleural effusions suggested  - B/l LE Dopplers: No evidence of deep venous thrombosis in either lower extremity.  - BNP: 42258  - TTE on 10/13/21(previous admission): LVEF 55-60%. LV diastolic function cannot determine due to atrial fibrillation. -- Do not need to repeat at time AC on Chronic Diastolic CHF   - supplemental O2 as needed   - s/p 60mg IVP-stop  - cont Lasix 60mg PO BID  - am CT chest:   1. Small bilateral pleural effusions.  2. Multifocal bilateral pneumonia, organism nonspecific, does not have the characteristic appearance of COVID-19 but is within range of what can be seen with COVID-19 pneumonia.  - Monitor fluid status closely  - Cardio, Dr. Bliss's group, following.  - Pulm following- recommends monitoring off antibiotics at this time. AC on chronic Diastolic CHF -stable   - CxR: Bilateral lung parenchymal airspace opacities, increased from prior. Small pleural effusions suggested  - B/l LE Dopplers: No evidence of deep venous thrombosis in either lower extremity.  - BNP: 60907  - TTE on 10/13/21(previous admission): LVEF 55-60%. LV diastolic function cannot determine due to atrial fibrillation. -- Do not need to repeat at time AC on Chronic Diastolic CHF   - supplemental O2 as needed   - s/p 60mg IVP-stop  - cont Lasix 60mg PO BID  - am CT chest:   1. Small bilateral pleural effusions.  2. Multifocal bilateral pneumonia, organism nonspecific, does not have the characteristic appearance of COVID-19 but is within range of what can be seen with COVID-19 pneumonia.  - Monitor fluid status closely  - Cardio, Dr. Bliss's group, following.  - Pulm following- recommends monitoring off antibiotics at this time.

## 2021-10-29 NOTE — DISCHARGE NOTE NURSING/CASE MANAGEMENT/SOCIAL WORK - PATIENT PORTAL LINK FT
You can access the FollowMyHealth Patient Portal offered by [facility] by registering at the following website: [email or web address]. By joining [employer]’s FollowMyHealth portal, you will also be able to view your health information using other applications (apps) compatible with our system.

## 2021-10-29 NOTE — PROGRESS NOTE ADULT - PROBLEM SELECTOR PLAN 8
- History of T2DM -- diet controlled   - HbA1C- 6.3 on last admission (10/8)  - started on carb consistent diet  - can start ISS if BG readings are elevated; BG was controlled on previous admission. - chronic  - CAD s/p stents >4 years ago (not on plavix)  - c/w aspirin.

## 2021-10-29 NOTE — PROGRESS NOTE ADULT - PROBLEM SELECTOR PLAN 6
- CKD 3-4- Cr stable   - baseline Cr appears to be 1.6 - 2.3  - Cr on admission 2.2; improved from 2.5 on 10/16  - Avoid nephrotoxic medications  - Trend BMP  - Nephro, Dr. Rodarte following - Lasix 60mg PO BID. - Hgb on admission 9.6 (per chart review baseline appears to be 9-10) 2/2 CKD -3  - hemodynamically stable, Pt was seen by hematology -Dr Rodriguez Anemia work up done   -pt Got 1 u pRBC 10/15/21   - No active signs or symptoms of bleeding at this time; healing mouth wounds  - On previous admission heme workup showed serum immunofixation with weak IgG-kappa, normal IgG/A/M, both kappa and lambda elevated and k/l ratio sl elevated. Findings more c/w MGUS or reactive, can be monitored serially as outpatient.-MGUS   - c/w folate and B12  - Out pt follow up with heme  - trend CBC. as out pt with hematologist

## 2021-10-29 NOTE — PROGRESS NOTE ADULT - PROBLEM SELECTOR PLAN 7
- Hgb on admission 9.6 (per chart review baseline appears to be 9-10) 2/2 CKD -3  - hemodynamically stable, Pt was seen by hematology -Dr Rodriguez Anemia work up done   -pt Got 1 u pRBC 10/15/21   - No active signs or symptoms of bleeding at this time; healing mouth wounds  - On previous admission heme workup showed serum immunofixation with weak IgG-kappa, normal IgG/A/M, both kappa and lambda elevated and k/l ratio sl elevated. Findings more c/w MGUS or reactive, can be monitored serially as outpatient.-MGUS   - c/w folate and B12  - Out pt follow up with heme  - trend CBC. - Chronic   - c/w home Mirtazapine, venlafaxin, Lamictal, will continue.

## 2021-10-29 NOTE — PROGRESS NOTE ADULT - PROBLEM SELECTOR PLAN 12
- AC with Eliquis.    Discharge planning:  - COVID PCR negative 10/27  - dispo, f/u KIERA. medication adherence concerns  - medications sent to pharmacy. medically stable for discharge. - AC with Eliquis.    Discharge planning:  - COVID PCR negative 10/27  - jim, f/u SW. medication adherence concerns  - medications sent to pharmacy. medically stable for discharge.  - spoke on phone with [facility] several times via phone today (10/29). Expressed concerns with group home not administering patient's home meds. Received confirmation that all new prescriptions sent to the pharmacy will be delivered to the group home, including updated dose of Imdur (90mg), and 60mg prescription to be picked back up by pharmacy when they make next delivery. Expressed concerns about NOT patient getting all of his medications from the group home. This patient has had a rapid return to the hospital (within 24-48 hours) after returning to this group home in the past. Patient's clinical status improves when receiving his home medications in the hospital. Attempting to ensure medication adherence as best as possible as patient is at GREAT risk of repeated clinical deterioration upon hospital discharge due to medication non-adherence.  - Medications and education face-to-face will be documented in discharge note.

## 2021-10-29 NOTE — PROGRESS NOTE ADULT - PROBLEM SELECTOR PROBLEM 12
DVT prophylaxis

## 2021-10-29 NOTE — PROGRESS NOTE ADULT - ASSESSMENT
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) living [facility]    K supplementation this am     CM noted reviewed -   Mucinex added for cough rx regimen  clonidine and imdur added for BP control  on lasix  vs noted  PPD read neg   dc planning - cm notes reviewed -     completed ABX for PNA  ct chest from recent admission reviewed, cxr noted, repeat CT chest reviewed - multifocal pna reported - however - clinically does not correlate -   would monitor off ABX - biomarkers - cx - monitor VS and Sat  assess - eval for HF - vol overload - Diuresis - I and O - cardio eval - TTE reviewed  ELMER - does not tolerate CPAP  CKD - monitor renal indices - i and o - replete lytes  dysphagia diet - asp prec - HOB elev - oral hygiene  assist with needs - ADL  emotional support  tele monitor  old records reviewed

## 2021-10-29 NOTE — DISCHARGE NOTE NURSING/CASE MANAGEMENT/SOCIAL WORK - NSDCFUADDAPPT_GEN_ALL_CORE_FT
ACT team to have a meeting Tuesday with [facility] staff to discuss plan of care for patient   ACT team [facility] Mental health counselor looking into [facility] rehab Adult Day care program

## 2021-10-29 NOTE — PROGRESS NOTE ADULT - SUBJECTIVE AND OBJECTIVE BOX
Date/Time Patient Seen:  		  Referring MD:   Data Reviewed	       Patient is a 76y old  Male who presents with a chief complaint of hypertensive urgency (28 Oct 2021 10:47)      Subjective/HPI     PAST MEDICAL & SURGICAL HISTORY:  Hypertension    Diabetes mellitus    Lupus    Hepatitis C    Anxiety and depression    CAD (coronary artery disease)  s/p stents    Diverticulosis    Hyperlipidemia    HTN (hypertension)  c/b multiple episodes of hypertensive urgency    HLD (hyperlipidemia)    Atrial fibrillation  first documented on EKG 10/7/2021    CAD (coronary artery disease)  s/p stents (not on plavix)    Type 2 diabetes mellitus  not on home insulin/ Meds    Anxiety  Depression  multiple psych medications    History of diverticulitis  07/2021    Diverticulosis  c/b GIB in 2020    Afib  on AC    Stage 3 chronic kidney disease    Anemia of chronic disease  Monoclonal Gammopathy-MGUS  pRBC transfusion 10/15/21    Chronic diastolic congestive heart failure    Multiple thyroid nodules    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  Had surgery ( April 2013 )    S/P tonsillectomy    S/P arthroscopic knee surgery  Bilateral ( 2005 )    Torsion of testicle  Had surgery at age 14    Pilonidal cyst  Had surgery ( 1969 )    S/P cataract surgery  Bilateral    H/O hernia repair          Medication list         MEDICATIONS  (STANDING):  amLODIPine   Tablet 10 milliGRAM(s) Oral daily  apixaban 5 milliGRAM(s) Oral every 12 hours  ARIPiprazole 30 milliGRAM(s) Oral daily  aspirin enteric coated 81 milliGRAM(s) Oral daily  atorvastatin 10 milliGRAM(s) Oral at bedtime  budesonide 160 MICROgram(s)/formoterol 4.5 MICROgram(s) Inhaler 2 Puff(s) Inhalation two times a day  cloNIDine 0.2 milliGRAM(s) Oral every 12 hours  cyanocobalamin 1000 MICROGram(s) Oral daily  doxazosin 8 milliGRAM(s) Oral at bedtime  famotidine    Tablet 20 milliGRAM(s) Oral daily  folic acid 1 milliGRAM(s) Oral daily  furosemide    Tablet 60 milliGRAM(s) Oral two times a day  hydrALAZINE 100 milliGRAM(s) Oral three times a day  isosorbide   mononitrate ER Tablet (IMDUR) 60 milliGRAM(s) Oral daily  lamoTRIgine 100 milliGRAM(s) Oral daily  mirtazapine 30 milliGRAM(s) Oral at bedtime  polyethylene glycol 3350 17 Gram(s) Oral daily  potassium chloride    Tablet ER 40 milliEquivalent(s) Oral once  venlafaxine XR. 150 milliGRAM(s) Oral daily    MEDICATIONS  (PRN):  acetaminophen     Tablet .. 650 milliGRAM(s) Oral every 6 hours PRN Mild Pain (1 - 3)  guaiFENesin  milliGRAM(s) Oral every 12 hours PRN Cough         Vitals log        ICU Vital Signs Last 24 Hrs  T(C): 36.6 (29 Oct 2021 04:51), Max: 36.7 (28 Oct 2021 20:17)  T(F): 97.8 (29 Oct 2021 04:51), Max: 98 (28 Oct 2021 20:17)  HR: 71 (29 Oct 2021 04:51) (67 - 74)  BP: 149/73 (29 Oct 2021 04:51) (149/73 - 185/85)  BP(mean): --  ABP: --  ABP(mean): --  RR: 18 (29 Oct 2021 04:51) (17 - 18)  SpO2: 93% (29 Oct 2021 04:51) (93% - 97%)           Input and Output:  I&O's Detail    28 Oct 2021 07:01  -  29 Oct 2021 07:00  --------------------------------------------------------  IN:    Oral Fluid: 660 mL  Total IN: 660 mL    OUT:    Voided (mL): 300 mL  Total OUT: 300 mL    Total NET: 360 mL          Lab Data                        9.2    6.86  )-----------( 275      ( 29 Oct 2021 07:08 )             29.8     10-29    142  |  108  |  44<H>  ----------------------------<  202<H>  3.3<L>   |  24  |  2.50<H>    Ca    9.2      29 Oct 2021 07:08              Review of Systems	      Objective     Physical Examination  heart s1s2  lung dec BS  abd soft        Pertinent Lab findings & Imaging      Nikko:  NO   Adequate UO     I&O's Detail    28 Oct 2021 07:01  -  29 Oct 2021 07:00  --------------------------------------------------------  IN:    Oral Fluid: 660 mL  Total IN: 660 mL    OUT:    Voided (mL): 300 mL  Total OUT: 300 mL    Total NET: 360 mL               Discussed with:     Cultures:	        Radiology

## 2021-10-29 NOTE — DISCHARGE NOTE NURSING/CASE MANAGEMENT/SOCIAL WORK - NSDCVIVACCINE_GEN_ALL_CORE_FT
Tdap; 24-Jul-2020 09:31; Valdez Watson (RN); [employer]; [id number] (Exp. Date: 17-Sep-2021); IntraMuscular; Deltoid Left.; 0.5 milliLiter(s); VIS (VIS Published: 09-May-2013, VIS Presented: 24-Jul-2020);

## 2021-10-29 NOTE — PROGRESS NOTE ADULT - PROBLEM SELECTOR PROBLEM 1
Acute exacerbation of CHF (congestive heart failure)

## 2021-10-29 NOTE — PROGRESS NOTE ADULT - PROBLEM SELECTOR PLAN 3
- Pt was recent admitted to [facility] from 10/7-10/16 and treated for PNA with IV Zosyn & Augmentin x 7 days   - Although CxR shows bilateral lung parenchymal airspace opacities, increased from prior pt does not have leukocytosis or fever and it is suspected SOB is more so 2/2 CHF exacerbation. CxR findings are suspected to be from previously treated PNA  - s/p vanc and zosyn in ED. will hold off on further Abx at this time.as per Pulmonary Dr Day   - F/u Cultures: pending results, Nl Lactate   - official S&S: dysphagis 3, soft solids   - Pulm, Dr. Day,- Keep Off Abx , glorialey Old PNA   Continue Symbicort 2x day. - chronic- tele  - EKG: HR: 74, Atrial fibrillation, Incomplete right bundle branch block, Nonspecific T wave abnormality, Prolonged QT(461)  - off digoxin and labetalol since last admission 2/2 Bradycardia & Pauses -- remains rate controlled  - AC with Eliquis 5mg BID   - Cardiology, Dr. Bliss's, group following. No BB

## 2021-11-18 PROBLEM — D63.8 ANEMIA IN OTHER CHRONIC DISEASES CLASSIFIED ELSEWHERE: Chronic | Status: ACTIVE | Noted: 2021-10-18

## 2021-11-18 PROBLEM — N18.30 CHRONIC KIDNEY DISEASE, STAGE 3 UNSPECIFIED: Chronic | Status: ACTIVE | Noted: 2021-10-18

## 2021-11-18 PROBLEM — F41.9 ANXIETY DISORDER, UNSPECIFIED: Chronic | Status: ACTIVE | Noted: 2021-10-07

## 2021-11-18 PROBLEM — I48.91 UNSPECIFIED ATRIAL FIBRILLATION: Chronic | Status: ACTIVE | Noted: 2021-10-18

## 2021-11-18 PROBLEM — E04.2 NONTOXIC MULTINODULAR GOITER: Chronic | Status: ACTIVE | Noted: 2021-10-18

## 2021-11-18 PROBLEM — E11.9 TYPE 2 DIABETES MELLITUS WITHOUT COMPLICATIONS: Chronic | Status: ACTIVE | Noted: 2021-10-07

## 2021-11-18 PROBLEM — I50.32 CHRONIC DIASTOLIC (CONGESTIVE) HEART FAILURE: Chronic | Status: ACTIVE | Noted: 2021-10-18

## 2021-11-29 DIAGNOSIS — J18.9 PNEUMONIA, UNSPECIFIED ORGANISM: ICD-10-CM

## 2021-11-29 DIAGNOSIS — D64.9 ANEMIA, UNSPECIFIED: ICD-10-CM

## 2021-11-29 DIAGNOSIS — F41.8 OTHER SPECIFIED ANXIETY DISORDERS: ICD-10-CM

## 2021-11-29 DIAGNOSIS — N18.30 CHRONIC KIDNEY DISEASE, STAGE 3 UNSPECIFIED: ICD-10-CM

## 2021-11-29 RX ORDER — CYANOCOBALAMIN (VITAMIN B-12) 1000 MCG
1000 TABLET ORAL
Refills: 0 | Status: ACTIVE | COMMUNITY

## 2021-11-29 RX ORDER — DOXAZOSIN 8 MG/1
8 TABLET ORAL
Refills: 0 | Status: ACTIVE | COMMUNITY

## 2021-11-29 RX ORDER — AMLODIPINE BESYLATE 10 MG/1
10 TABLET ORAL
Refills: 0 | Status: ACTIVE | COMMUNITY

## 2021-11-29 RX ORDER — ASPIRIN 81 MG
81 TABLET, DELAYED RELEASE (ENTERIC COATED) ORAL
Refills: 0 | Status: ACTIVE | COMMUNITY

## 2021-11-29 RX ORDER — FAMOTIDINE 20 MG/1
20 TABLET, FILM COATED ORAL
Refills: 0 | Status: ACTIVE | COMMUNITY

## 2021-11-29 RX ORDER — MIRTAZAPINE 30 MG/1
30 TABLET, FILM COATED ORAL
Refills: 0 | Status: ACTIVE | COMMUNITY

## 2021-11-29 RX ORDER — FUROSEMIDE 20 MG/1
20 TABLET ORAL TWICE DAILY
Refills: 0 | Status: ACTIVE | COMMUNITY

## 2021-11-29 RX ORDER — HYDRALAZINE HYDROCHLORIDE 50 MG/1
50 TABLET ORAL 3 TIMES DAILY
Refills: 0 | Status: ACTIVE | COMMUNITY

## 2021-11-29 RX ORDER — BUDESONIDE AND FORMOTEROL FUMARATE DIHYDRATE 160; 4.5 UG/1; UG/1
160-4.5 AEROSOL RESPIRATORY (INHALATION)
Refills: 0 | Status: ACTIVE | COMMUNITY

## 2021-11-29 RX ORDER — SENNOSIDES 8.6 MG/1
8.6 CAPSULE, GELATIN COATED ORAL
Refills: 0 | Status: ACTIVE | COMMUNITY

## 2021-11-29 RX ORDER — FOLIC ACID 1 MG/1
1 TABLET ORAL
Refills: 0 | Status: ACTIVE | COMMUNITY

## 2021-11-29 RX ORDER — VENLAFAXINE HYDROCHLORIDE 150 MG/1
150 CAPSULE, EXTENDED RELEASE ORAL
Refills: 0 | Status: ACTIVE | COMMUNITY

## 2021-11-29 RX ORDER — ISOSORBIDE MONONITRATE 30 MG/1
30 TABLET, EXTENDED RELEASE ORAL DAILY
Refills: 0 | Status: ACTIVE | COMMUNITY

## 2021-11-29 RX ORDER — LAMOTRIGINE 100 MG/1
100 TABLET ORAL
Refills: 0 | Status: ACTIVE | COMMUNITY

## 2021-11-29 RX ORDER — POTASSIUM CHLORIDE 1500 MG/1
20 TABLET, FILM COATED, EXTENDED RELEASE ORAL
Refills: 0 | Status: ACTIVE | COMMUNITY

## 2021-11-29 RX ORDER — APIXABAN 5 MG/1
5 TABLET, FILM COATED ORAL
Qty: 60 | Refills: 4 | Status: ACTIVE | COMMUNITY

## 2021-11-30 ENCOUNTER — NON-APPOINTMENT (OUTPATIENT)
Age: 76
End: 2021-11-30

## 2021-11-30 ENCOUNTER — APPOINTMENT (OUTPATIENT)
Dept: CARDIOLOGY | Facility: CLINIC | Age: 76
End: 2021-11-30
Payer: MEDICARE

## 2021-11-30 VITALS
BODY MASS INDEX: 34.1 KG/M2 | HEART RATE: 80 BPM | WEIGHT: 225 LBS | HEIGHT: 68 IN | OXYGEN SATURATION: 97 % | DIASTOLIC BLOOD PRESSURE: 100 MMHG | SYSTOLIC BLOOD PRESSURE: 180 MMHG

## 2021-11-30 VITALS — SYSTOLIC BLOOD PRESSURE: 160 MMHG | DIASTOLIC BLOOD PRESSURE: 80 MMHG

## 2021-11-30 PROCEDURE — 93000 ELECTROCARDIOGRAM COMPLETE: CPT

## 2021-11-30 PROCEDURE — 99215 OFFICE O/P EST HI 40 MIN: CPT

## 2021-11-30 RX ORDER — CLONIDINE HYDROCHLORIDE 0.2 MG/1
0.2 TABLET ORAL 3 TIMES DAILY
Qty: 90 | Refills: 1 | Status: ACTIVE | COMMUNITY

## 2021-12-24 ENCOUNTER — EMERGENCY (EMERGENCY)
Facility: HOSPITAL | Age: 76
LOS: 1 days | End: 2021-12-24
Attending: EMERGENCY MEDICINE
Payer: MEDICARE

## 2021-12-24 VITALS
HEIGHT: 68 IN | DIASTOLIC BLOOD PRESSURE: 67 MMHG | RESPIRATION RATE: 18 BRPM | HEART RATE: 73 BPM | TEMPERATURE: 99 F | OXYGEN SATURATION: 99 % | SYSTOLIC BLOOD PRESSURE: 219 MMHG

## 2021-12-24 DIAGNOSIS — N44.00 TORSION OF TESTIS, UNSPECIFIED: Chronic | ICD-10-CM

## 2021-12-24 DIAGNOSIS — Z98.89 OTHER SPECIFIED POSTPROCEDURAL STATES: Chronic | ICD-10-CM

## 2021-12-24 DIAGNOSIS — I66.9 OCCLUSION AND STENOSIS OF UNSPECIFIED CEREBRAL ARTERY: Chronic | ICD-10-CM

## 2021-12-24 DIAGNOSIS — Z98.49 CATARACT EXTRACTION STATUS, UNSPECIFIED EYE: Chronic | ICD-10-CM

## 2021-12-24 DIAGNOSIS — L05.91 PILONIDAL CYST WITHOUT ABSCESS: Chronic | ICD-10-CM

## 2021-12-24 LAB
ALBUMIN SERPL ELPH-MCNC: 3.1 G/DL — LOW (ref 3.3–5)
ALP SERPL-CCNC: 69 U/L — SIGNIFICANT CHANGE UP (ref 40–120)
ALT FLD-CCNC: 28 U/L — SIGNIFICANT CHANGE UP (ref 12–78)
ANION GAP SERPL CALC-SCNC: 9 MMOL/L — SIGNIFICANT CHANGE UP (ref 5–17)
APTT BLD: 40.8 SEC — HIGH (ref 27.5–35.5)
AST SERPL-CCNC: 18 U/L — SIGNIFICANT CHANGE UP (ref 15–37)
BASOPHILS # BLD AUTO: 0.02 K/UL — SIGNIFICANT CHANGE UP (ref 0–0.2)
BASOPHILS NFR BLD AUTO: 0.3 % — SIGNIFICANT CHANGE UP (ref 0–2)
BILIRUB SERPL-MCNC: 0.2 MG/DL — SIGNIFICANT CHANGE UP (ref 0.2–1.2)
BUN SERPL-MCNC: 43 MG/DL — HIGH (ref 7–23)
CALCIUM SERPL-MCNC: 8.8 MG/DL — SIGNIFICANT CHANGE UP (ref 8.5–10.1)
CHLORIDE SERPL-SCNC: 107 MMOL/L — SIGNIFICANT CHANGE UP (ref 96–108)
CO2 SERPL-SCNC: 28 MMOL/L — SIGNIFICANT CHANGE UP (ref 22–31)
CREAT SERPL-MCNC: 2 MG/DL — HIGH (ref 0.5–1.3)
EOSINOPHIL # BLD AUTO: 0.12 K/UL — SIGNIFICANT CHANGE UP (ref 0–0.5)
EOSINOPHIL NFR BLD AUTO: 1.6 % — SIGNIFICANT CHANGE UP (ref 0–6)
FLUAV AG NPH QL: SIGNIFICANT CHANGE UP
FLUBV AG NPH QL: SIGNIFICANT CHANGE UP
GLUCOSE SERPL-MCNC: 159 MG/DL — HIGH (ref 70–99)
HCT VFR BLD CALC: 30.4 % — LOW (ref 39–50)
HGB BLD-MCNC: 9.5 G/DL — LOW (ref 13–17)
IMM GRANULOCYTES NFR BLD AUTO: 0.5 % — SIGNIFICANT CHANGE UP (ref 0–1.5)
INR BLD: 1.33 RATIO — HIGH (ref 0.88–1.16)
LACTATE SERPL-SCNC: 1 MMOL/L — SIGNIFICANT CHANGE UP (ref 0.7–2)
LYMPHOCYTES # BLD AUTO: 1.02 K/UL — SIGNIFICANT CHANGE UP (ref 1–3.3)
LYMPHOCYTES # BLD AUTO: 13.7 % — SIGNIFICANT CHANGE UP (ref 13–44)
MCHC RBC-ENTMCNC: 26.8 PG — LOW (ref 27–34)
MCHC RBC-ENTMCNC: 31.3 GM/DL — LOW (ref 32–36)
MCV RBC AUTO: 85.9 FL — SIGNIFICANT CHANGE UP (ref 80–100)
MONOCYTES # BLD AUTO: 0.55 K/UL — SIGNIFICANT CHANGE UP (ref 0–0.9)
MONOCYTES NFR BLD AUTO: 7.4 % — SIGNIFICANT CHANGE UP (ref 2–14)
NEUTROPHILS # BLD AUTO: 5.71 K/UL — SIGNIFICANT CHANGE UP (ref 1.8–7.4)
NEUTROPHILS NFR BLD AUTO: 76.5 % — SIGNIFICANT CHANGE UP (ref 43–77)
NRBC # BLD: 0 /100 WBCS — SIGNIFICANT CHANGE UP (ref 0–0)
NT-PROBNP SERPL-SCNC: 1742 PG/ML — HIGH (ref 0–450)
PLATELET # BLD AUTO: 203 K/UL — SIGNIFICANT CHANGE UP (ref 150–400)
POTASSIUM SERPL-MCNC: 3.4 MMOL/L — LOW (ref 3.5–5.3)
POTASSIUM SERPL-SCNC: 3.4 MMOL/L — LOW (ref 3.5–5.3)
PROT SERPL-MCNC: 6.6 G/DL — SIGNIFICANT CHANGE UP (ref 6–8.3)
PROTHROM AB SERPL-ACNC: 15.3 SEC — HIGH (ref 10.6–13.6)
RBC # BLD: 3.54 M/UL — LOW (ref 4.2–5.8)
RBC # FLD: 17 % — HIGH (ref 10.3–14.5)
RSV RNA NPH QL NAA+NON-PROBE: SIGNIFICANT CHANGE UP
SARS-COV-2 RNA SPEC QL NAA+PROBE: SIGNIFICANT CHANGE UP
SODIUM SERPL-SCNC: 144 MMOL/L — SIGNIFICANT CHANGE UP (ref 135–145)
WBC # BLD: 7.46 K/UL — SIGNIFICANT CHANGE UP (ref 3.8–10.5)
WBC # FLD AUTO: 7.46 K/UL — SIGNIFICANT CHANGE UP (ref 3.8–10.5)

## 2021-12-24 PROCEDURE — 93010 ELECTROCARDIOGRAM REPORT: CPT

## 2021-12-24 PROCEDURE — 85730 THROMBOPLASTIN TIME PARTIAL: CPT

## 2021-12-24 PROCEDURE — 83880 ASSAY OF NATRIURETIC PEPTIDE: CPT

## 2021-12-24 PROCEDURE — 96374 THER/PROPH/DIAG INJ IV PUSH: CPT

## 2021-12-24 PROCEDURE — 71045 X-RAY EXAM CHEST 1 VIEW: CPT | Mod: 26

## 2021-12-24 PROCEDURE — 80053 COMPREHEN METABOLIC PANEL: CPT

## 2021-12-24 PROCEDURE — 85610 PROTHROMBIN TIME: CPT

## 2021-12-24 PROCEDURE — 96375 TX/PRO/DX INJ NEW DRUG ADDON: CPT

## 2021-12-24 PROCEDURE — 99285 EMERGENCY DEPT VISIT HI MDM: CPT

## 2021-12-24 PROCEDURE — 87040 BLOOD CULTURE FOR BACTERIA: CPT

## 2021-12-24 PROCEDURE — 87637 SARSCOV2&INF A&B&RSV AMP PRB: CPT

## 2021-12-24 PROCEDURE — 93005 ELECTROCARDIOGRAM TRACING: CPT

## 2021-12-24 PROCEDURE — 99284 EMERGENCY DEPT VISIT MOD MDM: CPT | Mod: 25

## 2021-12-24 PROCEDURE — 94640 AIRWAY INHALATION TREATMENT: CPT

## 2021-12-24 PROCEDURE — 71045 X-RAY EXAM CHEST 1 VIEW: CPT

## 2021-12-24 PROCEDURE — 83605 ASSAY OF LACTIC ACID: CPT

## 2021-12-24 PROCEDURE — 85025 COMPLETE CBC W/AUTO DIFF WBC: CPT

## 2021-12-24 PROCEDURE — 36415 COLL VENOUS BLD VENIPUNCTURE: CPT

## 2021-12-24 RX ORDER — FUROSEMIDE 40 MG
40 TABLET ORAL ONCE
Refills: 0 | Status: COMPLETED | OUTPATIENT
Start: 2021-12-24 | End: 2021-12-24

## 2021-12-24 RX ORDER — IPRATROPIUM/ALBUTEROL SULFATE 18-103MCG
3 AEROSOL WITH ADAPTER (GRAM) INHALATION ONCE
Refills: 0 | Status: COMPLETED | OUTPATIENT
Start: 2021-12-24 | End: 2021-12-24

## 2021-12-24 RX ADMIN — Medication 125 MILLIGRAM(S): at 01:07

## 2021-12-24 RX ADMIN — Medication 3 MILLILITER(S): at 01:07

## 2021-12-24 RX ADMIN — Medication 40 MILLIGRAM(S): at 01:58

## 2021-12-24 NOTE — ED ADULT TRIAGE NOTE - CCCP TRG CHIEF CMPLNT
[address] 
[facility] EMERGENCY DEPT 
[address] [place] [address] [facility] [address] [id number] [phone] Work/School Note Date: 10/26/2017 To Whom It May concern: 
 
Sameer Bourgeois was seen and treated today in the emergency room by the following provider(s): 
Attending Provider: Judi Fleming MD 
Physician Assistant: NICO Amador. Sameer Bourgeois may return to work on 10/29/17 or sooner, if feeling better. Sincerely, Bethany Hui PA 
 
 
 

difficulty breathing

## 2021-12-24 NOTE — ED PROVIDER NOTE - PATIENT PORTAL LINK FT
You can access the FollowMyHealth Patient Portal offered by [facility] by registering at the following website: [email or web address]. By joining [employer]s FollowMyHealth portal, you will also be able to view your health information using other applications (apps) compatible with our system.

## 2021-12-24 NOTE — ED PROVIDER NOTE - NSFOLLOWUPINSTRUCTIONS_ED_ALL_ED_FT
Dyspnea    WHAT YOU NEED TO KNOW:    Dyspnea is breathing difficulty or discomfort. You may have labored, painful, or shallow breathing. You may feel breathless or short of breath. Dyspnea can occur during rest or with activity. You may have dyspnea for a short time, or it might become chronic. Dyspnea is often a symptom of a disease or condition.    DISCHARGE INSTRUCTIONS:    Return to the emergency department if:   •Your signs and symptoms are the same or worse within 24 hours of treatment.       •You have shaking chills or a fever over 102°F.       •You have new pain, pressure, or tightness in your chest.       •You have a new or worse cough or wheezing, or you cough up blood.      •You feel like you cannot get enough air.      •The skin over your ribs or on your neck sinks in when you breathe.       •You have a severe headache with vomiting and abdominal pain.       •You feel confused or dizzy.      Call your doctor or specialist if:   •You have questions or concerns about your condition or care.          Medicines:    •Medicines may be used to treat the cause of your dyspnea. Medicines may reduce swelling in your airway or decrease extra fluid from around your heart or lungs. Other medicines may be used to decrease anxiety and help you feel calm and relaxed.      •Take your medicine as directed. Contact your healthcare provider if you think your medicine is not helping or if you have side effects. Tell him or her if you are allergic to any medicine. Keep a list of the medicines, vitamins, and herbs you take. Include the amounts, and when and why you take them. Bring the list or the pill bottles to follow-up visits. Carry your medicine list with you in case of an emergency.      Manage long-term dyspnea:   •Create an action plan. You and your healthcare provider can work together to create a plan for how to handle episodes of dyspnea. The plan can include daily activities, treatment changes, and what to do if you have severe breathing problems.      •Lean forward on your elbows when you sit. This helps your lungs expand and may make it easier to breathe.      •Use pursed-lip breathing any time you feel short of breath. Breathe in through your nose and then slowly breathe out through your mouth with your lips slightly puckered. It should take you twice as long to breathe out as it did to breathe in.  Breathe in Breathe out           •Do not smoke. Nicotine and other chemicals in cigarettes and cigars can cause lung damage and make it harder to breathe. Ask your healthcare provider for information if you currently smoke and need help to quit. E-cigarettes or smokeless tobacco still contain nicotine. Talk to your healthcare provider before you use these products.       •Reach or maintain a healthy weight. Your healthcare provider can help you create a safe weight loss plan if you are overweight.      •Exercise as directed. Exercise can help your lungs work more easily. Exercise can also help you lose weight if needed. Try to get at least 30 minutes of exercise most days of the week. Your healthcare provider can help you create an exercise plan that is safe for you.      Follow up with your doctor or specialist as directed: Write down your questions so you remember to ask them during your visits.       © Copyright [employer] 2021

## 2021-12-24 NOTE — ED ADULT NURSE NOTE - CHIEF COMPLAINT QUOTE
Patient brought in by ambulance from [facility] as reported had difficulty breathing on non-rebreather mask

## 2021-12-24 NOTE — ED PROVIDER NOTE - NSICDXPASTMEDICALHX_GEN_ALL_CORE_FT
PAST MEDICAL HISTORY:  Afib on AC    Anemia of chronic disease Monoclonal Gammopathy-MGUS  pRBC transfusion 10/15/21    Anxiety Depression  multiple psych medications    Atrial fibrillation first documented on EKG 10/7/2021    CAD (coronary artery disease) s/p stents (not on plavix)    Chronic diastolic congestive heart failure     Diverticulosis c/b GIB in 2020    History of diverticulitis 07/2021    HLD (hyperlipidemia)     HTN (hypertension) c/b multiple episodes of hypertensive urgency    Multiple thyroid nodules     Stage 3 chronic kidney disease     Type 2 diabetes mellitus not on home insulin/ Meds

## 2021-12-24 NOTE — ED PROVIDER NOTE - PROGRESS NOTE DETAILS
pt feeling better, no longer sob, results reviewed with pt, pt to be d/c home follow up with pmd, continue meds as prescribed, return if any symptoms worsen

## 2021-12-24 NOTE — ED PROVIDER NOTE - OBJECTIVE STATEMENT
75yo male bib ems with worsening sob for the last 2 weeks, no cough, fever, chills, no chest pain, pt states his legs are normally swollen no worsening edema, no other complaints

## 2021-12-24 NOTE — ED ADULT NURSE NOTE - NSIMPLEMENTINTERV_GEN_ALL_ED
Implemented All Fall Risk Interventions:  [place] to call system. Call bell, personal items and telephone within reach. Instruct patient to call for assistance. Room bathroom lighting operational. Non-slip footwear when patient is off stretcher. Physically safe environment: no spills, clutter or unnecessary equipment. Stretcher in lowest position, wheels locked, appropriate side rails in place. Provide visual cue, wrist band, yellow gown, etc. Monitor gait and stability. Monitor for mental status changes and reorient to person, place, and time. Review medications for side effects contributing to fall risk. Reinforce activity limits and safety measures with patient and family.

## 2021-12-24 NOTE — ED ADULT TRIAGE NOTE - CHIEF COMPLAINT QUOTE
Patient brought in by ambulance from [facility] as reported had difficulty breathing on non-rebreather mask

## 2021-12-30 ENCOUNTER — NON-APPOINTMENT (OUTPATIENT)
Age: 76
End: 2021-12-30

## 2021-12-30 ENCOUNTER — APPOINTMENT (OUTPATIENT)
Dept: CARDIOLOGY | Facility: CLINIC | Age: 76
End: 2021-12-30
Payer: MEDICARE

## 2021-12-30 VITALS — SYSTOLIC BLOOD PRESSURE: 146 MMHG | DIASTOLIC BLOOD PRESSURE: 80 MMHG

## 2021-12-30 VITALS — OXYGEN SATURATION: 95 % | HEART RATE: 65 BPM | SYSTOLIC BLOOD PRESSURE: 190 MMHG | DIASTOLIC BLOOD PRESSURE: 96 MMHG

## 2021-12-30 DIAGNOSIS — I48.91 UNSPECIFIED ATRIAL FIBRILLATION: ICD-10-CM

## 2021-12-30 PROCEDURE — 99215 OFFICE O/P EST HI 40 MIN: CPT

## 2021-12-30 PROCEDURE — 93000 ELECTROCARDIOGRAM COMPLETE: CPT

## 2021-12-30 NOTE — BH CONSULTATION LIAISON ASSESSMENT NOTE - RECORDS REVIEWED
Quality 130: Documentation Of Current Medications In The Medical Record: Current Medications Documented
Detail Level: Detailed
Hospital chart (includes HIE)

## 2022-01-01 NOTE — PATIENT PROFILE ADULT - NSPROHMSYMPCOND_GEN_A_NUR
2022  2:57 PM    CM met with MOB to complete initial assessment and begin discharge planning. MOB verified and confirmed demographics. SEGUNDO lives with her mom: Dontrell Lewis,  at the address on file. SEGUNDO is enrolled at [employer] and is in her 10th grade. MOB reports that FOB is not present nor supportive. SEGUNDO reports she has good family support, and feels like she has the support she needs when she returns home. MOB plans to breast and bottle feed baby, MOB was provided with a manual breast pump. CM educated MOB on how to obtain an electric pump, through [address], but she is unsure if she is receiving WIC. MOB also noted that she did not get any prenatal care because she had not told her mom she was pregnant. CM educated MOB in the importance of follow up care after delivery. MOB is agreeable to follow up with MD. [facility] has scheduled a 6wk follow up. Dr. Nelida Henson will provide follow up care for infant. MOB confirmed that they have car seat, bassinet/crib, clothing, bottles and all necessary supplies for baby. SEGUNDO does not have insurance but has met with [employer] for assistance with Medicaid application for herself and infant. CM also provided MOB with information for [address] CM following for needs. Care Management Interventions  PCP Verified by CM: Yes (Dameon Black)  Mode of Transport at Discharge:  Other (see comment)  Transition of Care Consult (CM Consult): Discharge Planning  Support Systems: Parent(s)  Confirm Follow Up Transport: Family  Discharge Location  Patient Expects to be Discharged to[de-identified] Home with family assistance  Marianna Bhardwaj diabetes

## 2022-01-03 ENCOUNTER — INPATIENT (INPATIENT)
Facility: HOSPITAL | Age: 77
LOS: 14 days | Discharge: NURSING, REHAB OR LONG-TERM CARE | DRG: 177 | End: 2022-01-18
Attending: HOSPITALIST | Admitting: INTERNAL MEDICINE
Payer: MEDICARE

## 2022-01-03 VITALS
HEART RATE: 88 BPM | RESPIRATION RATE: 16 BRPM | HEIGHT: 68 IN | TEMPERATURE: 97 F | DIASTOLIC BLOOD PRESSURE: 86 MMHG | SYSTOLIC BLOOD PRESSURE: 172 MMHG | OXYGEN SATURATION: 100 %

## 2022-01-03 DIAGNOSIS — F41.9 ANXIETY DISORDER, UNSPECIFIED: ICD-10-CM

## 2022-01-03 DIAGNOSIS — L05.91 PILONIDAL CYST WITHOUT ABSCESS: Chronic | ICD-10-CM

## 2022-01-03 DIAGNOSIS — D63.8 ANEMIA IN OTHER CHRONIC DISEASES CLASSIFIED ELSEWHERE: ICD-10-CM

## 2022-01-03 DIAGNOSIS — N18.30 CHRONIC KIDNEY DISEASE, STAGE 3 UNSPECIFIED: ICD-10-CM

## 2022-01-03 DIAGNOSIS — E11.9 TYPE 2 DIABETES MELLITUS WITHOUT COMPLICATIONS: ICD-10-CM

## 2022-01-03 DIAGNOSIS — I50.32 CHRONIC DIASTOLIC (CONGESTIVE) HEART FAILURE: ICD-10-CM

## 2022-01-03 DIAGNOSIS — E78.5 HYPERLIPIDEMIA, UNSPECIFIED: ICD-10-CM

## 2022-01-03 DIAGNOSIS — K57.90 DIVERTICULOSIS OF INTESTINE, PART UNSPECIFIED, WITHOUT PERFORATION OR ABSCESS WITHOUT BLEEDING: ICD-10-CM

## 2022-01-03 DIAGNOSIS — I66.9 OCCLUSION AND STENOSIS OF UNSPECIFIED CEREBRAL ARTERY: Chronic | ICD-10-CM

## 2022-01-03 DIAGNOSIS — Z98.89 OTHER SPECIFIED POSTPROCEDURAL STATES: Chronic | ICD-10-CM

## 2022-01-03 DIAGNOSIS — Z29.9 ENCOUNTER FOR PROPHYLACTIC MEASURES, UNSPECIFIED: ICD-10-CM

## 2022-01-03 DIAGNOSIS — R06.02 SHORTNESS OF BREATH: ICD-10-CM

## 2022-01-03 DIAGNOSIS — I10 ESSENTIAL (PRIMARY) HYPERTENSION: ICD-10-CM

## 2022-01-03 DIAGNOSIS — K21.9 GASTRO-ESOPHAGEAL REFLUX DISEASE WITHOUT ESOPHAGITIS: ICD-10-CM

## 2022-01-03 DIAGNOSIS — R06.00 DYSPNEA, UNSPECIFIED: ICD-10-CM

## 2022-01-03 DIAGNOSIS — I48.91 UNSPECIFIED ATRIAL FIBRILLATION: ICD-10-CM

## 2022-01-03 DIAGNOSIS — I25.10 ATHEROSCLEROTIC HEART DISEASE OF NATIVE CORONARY ARTERY WITHOUT ANGINA PECTORIS: ICD-10-CM

## 2022-01-03 DIAGNOSIS — Z98.49 CATARACT EXTRACTION STATUS, UNSPECIFIED EYE: Chronic | ICD-10-CM

## 2022-01-03 DIAGNOSIS — N44.00 TORSION OF TESTIS, UNSPECIFIED: Chronic | ICD-10-CM

## 2022-01-03 LAB
ALBUMIN SERPL ELPH-MCNC: 3 G/DL — LOW (ref 3.3–5)
ALP SERPL-CCNC: 65 U/L — SIGNIFICANT CHANGE UP (ref 40–120)
ALT FLD-CCNC: 28 U/L — SIGNIFICANT CHANGE UP (ref 12–78)
ANION GAP SERPL CALC-SCNC: 8 MMOL/L — SIGNIFICANT CHANGE UP (ref 5–17)
APPEARANCE UR: CLEAR — SIGNIFICANT CHANGE UP
APTT BLD: 41.2 SEC — HIGH (ref 27.5–35.5)
AST SERPL-CCNC: 18 U/L — SIGNIFICANT CHANGE UP (ref 15–37)
BACTERIA # UR AUTO: ABNORMAL
BASOPHILS # BLD AUTO: 0.02 K/UL — SIGNIFICANT CHANGE UP (ref 0–0.2)
BASOPHILS NFR BLD AUTO: 0.3 % — SIGNIFICANT CHANGE UP (ref 0–2)
BILIRUB SERPL-MCNC: 0.4 MG/DL — SIGNIFICANT CHANGE UP (ref 0.2–1.2)
BILIRUB UR-MCNC: NEGATIVE — SIGNIFICANT CHANGE UP
BUN SERPL-MCNC: 28 MG/DL — HIGH (ref 7–23)
CALCIUM SERPL-MCNC: 8.2 MG/DL — LOW (ref 8.5–10.1)
CHLORIDE SERPL-SCNC: 110 MMOL/L — HIGH (ref 96–108)
CO2 SERPL-SCNC: 22 MMOL/L — SIGNIFICANT CHANGE UP (ref 22–31)
COLOR SPEC: SIGNIFICANT CHANGE UP
COMMENT - URINE: SIGNIFICANT CHANGE UP
COMMENT - URINE: SIGNIFICANT CHANGE UP
CREAT SERPL-MCNC: 2.3 MG/DL — HIGH (ref 0.5–1.3)
CRP SERPL-MCNC: 42 MG/L — HIGH
D DIMER BLD IA.RAPID-MCNC: 215 NG/ML DDU — SIGNIFICANT CHANGE UP
DIFF PNL FLD: ABNORMAL
EOSINOPHIL # BLD AUTO: 0.01 K/UL — SIGNIFICANT CHANGE UP (ref 0–0.5)
EOSINOPHIL NFR BLD AUTO: 0.1 % — SIGNIFICANT CHANGE UP (ref 0–6)
EPI CELLS # UR: SIGNIFICANT CHANGE UP
FERRITIN SERPL-MCNC: 37 NG/ML — SIGNIFICANT CHANGE UP (ref 30–400)
FLUAV AG NPH QL: SIGNIFICANT CHANGE UP
FLUBV AG NPH QL: SIGNIFICANT CHANGE UP
GLUCOSE SERPL-MCNC: 136 MG/DL — HIGH (ref 70–99)
GLUCOSE UR QL: NEGATIVE — SIGNIFICANT CHANGE UP
GRAN CASTS # UR COMP ASSIST: ABNORMAL /LPF
HCT VFR BLD CALC: 29.1 % — LOW (ref 39–50)
HGB BLD-MCNC: 9.2 G/DL — LOW (ref 13–17)
IMM GRANULOCYTES NFR BLD AUTO: 0.5 % — SIGNIFICANT CHANGE UP (ref 0–1.5)
INR BLD: 1.47 RATIO — HIGH (ref 0.88–1.16)
KETONES UR-MCNC: NEGATIVE — SIGNIFICANT CHANGE UP
LACTATE SERPL-SCNC: 0.5 MMOL/L — LOW (ref 0.7–2)
LEUKOCYTE ESTERASE UR-ACNC: NEGATIVE — SIGNIFICANT CHANGE UP
LYMPHOCYTES # BLD AUTO: 0.49 K/UL — LOW (ref 1–3.3)
LYMPHOCYTES # BLD AUTO: 6.5 % — LOW (ref 13–44)
MANUAL SMEAR VERIFICATION: SIGNIFICANT CHANGE UP
MCHC RBC-ENTMCNC: 27.5 PG — SIGNIFICANT CHANGE UP (ref 27–34)
MCHC RBC-ENTMCNC: 31.6 GM/DL — LOW (ref 32–36)
MCV RBC AUTO: 86.9 FL — SIGNIFICANT CHANGE UP (ref 80–100)
MONOCYTES # BLD AUTO: 0.49 K/UL — SIGNIFICANT CHANGE UP (ref 0–0.9)
MONOCYTES NFR BLD AUTO: 6.5 % — SIGNIFICANT CHANGE UP (ref 2–14)
NEUTROPHILS # BLD AUTO: 6.54 K/UL — SIGNIFICANT CHANGE UP (ref 1.8–7.4)
NEUTROPHILS NFR BLD AUTO: 86.1 % — HIGH (ref 43–77)
NITRITE UR-MCNC: NEGATIVE — SIGNIFICANT CHANGE UP
NRBC # BLD: 0 /100 WBCS — SIGNIFICANT CHANGE UP (ref 0–0)
NT-PROBNP SERPL-SCNC: 4574 PG/ML — HIGH (ref 0–450)
PH UR: 5 — SIGNIFICANT CHANGE UP (ref 5–8)
PLAT MORPH BLD: NORMAL — SIGNIFICANT CHANGE UP
PLATELET # BLD AUTO: 201 K/UL — SIGNIFICANT CHANGE UP (ref 150–400)
POTASSIUM SERPL-MCNC: 3.3 MMOL/L — LOW (ref 3.5–5.3)
POTASSIUM SERPL-SCNC: 3.3 MMOL/L — LOW (ref 3.5–5.3)
PROCALCITONIN SERPL-MCNC: 0.2 NG/ML — HIGH (ref 0–0.04)
PROT SERPL-MCNC: 6.5 G/DL — SIGNIFICANT CHANGE UP (ref 6–8.3)
PROT UR-MCNC: 500 MG/DL
PROTHROM AB SERPL-ACNC: 16.9 SEC — HIGH (ref 10.6–13.6)
RBC # BLD: 3.35 M/UL — LOW (ref 4.2–5.8)
RBC # FLD: 17.6 % — HIGH (ref 10.3–14.5)
RBC BLD AUTO: SIGNIFICANT CHANGE UP
RBC CASTS # UR COMP ASSIST: SIGNIFICANT CHANGE UP /HPF (ref 0–4)
RSV RNA NPH QL NAA+NON-PROBE: SIGNIFICANT CHANGE UP
SARS-COV-2 RNA SPEC QL NAA+PROBE: DETECTED
SODIUM SERPL-SCNC: 140 MMOL/L — SIGNIFICANT CHANGE UP (ref 135–145)
SP GR SPEC: 1.02 — SIGNIFICANT CHANGE UP (ref 1.01–1.02)
TROPONIN I, HIGH SENSITIVITY RESULT: 114.7 NG/L — HIGH
TROPONIN I, HIGH SENSITIVITY RESULT: 117.8 NG/L — HIGH
TSH SERPL-MCNC: 0.59 UIU/ML — SIGNIFICANT CHANGE UP (ref 0.36–3.74)
UROBILINOGEN FLD QL: NEGATIVE — SIGNIFICANT CHANGE UP
WBC # BLD: 7.59 K/UL — SIGNIFICANT CHANGE UP (ref 3.8–10.5)
WBC # FLD AUTO: 7.59 K/UL — SIGNIFICANT CHANGE UP (ref 3.8–10.5)

## 2022-01-03 PROCEDURE — 99223 1ST HOSP IP/OBS HIGH 75: CPT | Mod: GC

## 2022-01-03 PROCEDURE — 99285 EMERGENCY DEPT VISIT HI MDM: CPT

## 2022-01-03 PROCEDURE — 99223 1ST HOSP IP/OBS HIGH 75: CPT

## 2022-01-03 PROCEDURE — 99221 1ST HOSP IP/OBS SF/LOW 40: CPT

## 2022-01-03 PROCEDURE — 93010 ELECTROCARDIOGRAM REPORT: CPT

## 2022-01-03 PROCEDURE — 71045 X-RAY EXAM CHEST 1 VIEW: CPT | Mod: 26

## 2022-01-03 RX ORDER — ISOSORBIDE MONONITRATE 60 MG/1
90 TABLET, EXTENDED RELEASE ORAL DAILY
Refills: 0 | Status: DISCONTINUED | OUTPATIENT
Start: 2022-01-03 | End: 2022-01-06

## 2022-01-03 RX ORDER — APIXABAN 2.5 MG/1
5 TABLET, FILM COATED ORAL
Refills: 0 | Status: DISCONTINUED | OUTPATIENT
Start: 2022-01-03 | End: 2022-01-18

## 2022-01-03 RX ORDER — PANTOPRAZOLE SODIUM 20 MG/1
40 TABLET, DELAYED RELEASE ORAL
Refills: 0 | Status: DISCONTINUED | OUTPATIENT
Start: 2022-01-03 | End: 2022-01-18

## 2022-01-03 RX ORDER — GLUCAGON INJECTION, SOLUTION 0.5 MG/.1ML
1 INJECTION, SOLUTION SUBCUTANEOUS ONCE
Refills: 0 | Status: DISCONTINUED | OUTPATIENT
Start: 2022-01-03 | End: 2022-01-18

## 2022-01-03 RX ORDER — ARIPIPRAZOLE 15 MG/1
30 TABLET ORAL DAILY
Refills: 0 | Status: DISCONTINUED | OUTPATIENT
Start: 2022-01-03 | End: 2022-01-18

## 2022-01-03 RX ORDER — MIRTAZAPINE 45 MG/1
30 TABLET, ORALLY DISINTEGRATING ORAL AT BEDTIME
Refills: 0 | Status: DISCONTINUED | OUTPATIENT
Start: 2022-01-03 | End: 2022-01-18

## 2022-01-03 RX ORDER — ATORVASTATIN CALCIUM 80 MG/1
10 TABLET, FILM COATED ORAL AT BEDTIME
Refills: 0 | Status: DISCONTINUED | OUTPATIENT
Start: 2022-01-03 | End: 2022-01-18

## 2022-01-03 RX ORDER — DEXTROSE 50 % IN WATER 50 %
25 SYRINGE (ML) INTRAVENOUS ONCE
Refills: 0 | Status: DISCONTINUED | OUTPATIENT
Start: 2022-01-03 | End: 2022-01-18

## 2022-01-03 RX ORDER — OXYBUTYNIN CHLORIDE 5 MG
10 TABLET ORAL DAILY
Refills: 0 | Status: DISCONTINUED | OUTPATIENT
Start: 2022-01-03 | End: 2022-01-03

## 2022-01-03 RX ORDER — ASPIRIN/CALCIUM CARB/MAGNESIUM 324 MG
324 TABLET ORAL ONCE
Refills: 0 | Status: COMPLETED | OUTPATIENT
Start: 2022-01-03 | End: 2022-01-03

## 2022-01-03 RX ORDER — FAMOTIDINE 10 MG/ML
20 INJECTION INTRAVENOUS DAILY
Refills: 0 | Status: DISCONTINUED | OUTPATIENT
Start: 2022-01-03 | End: 2022-01-18

## 2022-01-03 RX ORDER — ASPIRIN/CALCIUM CARB/MAGNESIUM 324 MG
81 TABLET ORAL DAILY
Refills: 0 | Status: DISCONTINUED | OUTPATIENT
Start: 2022-01-04 | End: 2022-01-18

## 2022-01-03 RX ORDER — HYDRALAZINE HCL 50 MG
100 TABLET ORAL THREE TIMES A DAY
Refills: 0 | Status: DISCONTINUED | OUTPATIENT
Start: 2022-01-03 | End: 2022-01-18

## 2022-01-03 RX ORDER — POTASSIUM CHLORIDE 20 MEQ
40 PACKET (EA) ORAL EVERY 4 HOURS
Refills: 0 | Status: COMPLETED | OUTPATIENT
Start: 2022-01-03 | End: 2022-01-03

## 2022-01-03 RX ORDER — CARVEDILOL PHOSPHATE 80 MG/1
6.25 CAPSULE, EXTENDED RELEASE ORAL EVERY 12 HOURS
Refills: 0 | Status: DISCONTINUED | OUTPATIENT
Start: 2022-01-03 | End: 2022-01-09

## 2022-01-03 RX ORDER — OXYBUTYNIN CHLORIDE 5 MG
5 TABLET ORAL
Refills: 0 | Status: DISCONTINUED | OUTPATIENT
Start: 2022-01-03 | End: 2022-01-18

## 2022-01-03 RX ORDER — DEXTROSE 50 % IN WATER 50 %
12.5 SYRINGE (ML) INTRAVENOUS ONCE
Refills: 0 | Status: DISCONTINUED | OUTPATIENT
Start: 2022-01-03 | End: 2022-01-18

## 2022-01-03 RX ORDER — LAMOTRIGINE 25 MG/1
100 TABLET, ORALLY DISINTEGRATING ORAL DAILY
Refills: 0 | Status: DISCONTINUED | OUTPATIENT
Start: 2022-01-03 | End: 2022-01-18

## 2022-01-03 RX ORDER — ACETAMINOPHEN 500 MG
650 TABLET ORAL ONCE
Refills: 0 | Status: DISCONTINUED | OUTPATIENT
Start: 2022-01-03 | End: 2022-01-04

## 2022-01-03 RX ORDER — INSULIN LISPRO 100/ML
VIAL (ML) SUBCUTANEOUS
Refills: 0 | Status: DISCONTINUED | OUTPATIENT
Start: 2022-01-03 | End: 2022-01-06

## 2022-01-03 RX ORDER — PREGABALIN 225 MG/1
1 CAPSULE ORAL
Qty: 0 | Refills: 0 | DISCHARGE

## 2022-01-03 RX ORDER — FUROSEMIDE 40 MG
40 TABLET ORAL
Refills: 0 | Status: DISCONTINUED | OUTPATIENT
Start: 2022-01-03 | End: 2022-01-11

## 2022-01-03 RX ORDER — PREGABALIN 225 MG/1
1000 CAPSULE ORAL DAILY
Refills: 0 | Status: DISCONTINUED | OUTPATIENT
Start: 2022-01-03 | End: 2022-01-18

## 2022-01-03 RX ORDER — SODIUM CHLORIDE 9 MG/ML
1000 INJECTION, SOLUTION INTRAVENOUS ONCE
Refills: 0 | Status: COMPLETED | OUTPATIENT
Start: 2022-01-03 | End: 2022-01-03

## 2022-01-03 RX ORDER — VENLAFAXINE HCL 75 MG
150 CAPSULE, EXT RELEASE 24 HR ORAL DAILY
Refills: 0 | Status: DISCONTINUED | OUTPATIENT
Start: 2022-01-03 | End: 2022-01-18

## 2022-01-03 RX ORDER — SODIUM CHLORIDE 9 MG/ML
1000 INJECTION, SOLUTION INTRAVENOUS
Refills: 0 | Status: DISCONTINUED | OUTPATIENT
Start: 2022-01-03 | End: 2022-01-18

## 2022-01-03 RX ORDER — DEXTROSE 50 % IN WATER 50 %
15 SYRINGE (ML) INTRAVENOUS ONCE
Refills: 0 | Status: DISCONTINUED | OUTPATIENT
Start: 2022-01-03 | End: 2022-01-18

## 2022-01-03 RX ORDER — AMLODIPINE BESYLATE 2.5 MG/1
10 TABLET ORAL DAILY
Refills: 0 | Status: DISCONTINUED | OUTPATIENT
Start: 2022-01-03 | End: 2022-01-18

## 2022-01-03 RX ORDER — BUDESONIDE AND FORMOTEROL FUMARATE DIHYDRATE 160; 4.5 UG/1; UG/1
2 AEROSOL RESPIRATORY (INHALATION)
Refills: 0 | Status: DISCONTINUED | OUTPATIENT
Start: 2022-01-03 | End: 2022-01-18

## 2022-01-03 RX ORDER — FOLIC ACID 0.8 MG
1 TABLET ORAL DAILY
Refills: 0 | Status: DISCONTINUED | OUTPATIENT
Start: 2022-01-03 | End: 2022-01-18

## 2022-01-03 RX ORDER — INSULIN LISPRO 100/ML
VIAL (ML) SUBCUTANEOUS AT BEDTIME
Refills: 0 | Status: DISCONTINUED | OUTPATIENT
Start: 2022-01-03 | End: 2022-01-06

## 2022-01-03 RX ADMIN — Medication 40 MILLIGRAM(S): at 17:55

## 2022-01-03 RX ADMIN — Medication 5 MILLIGRAM(S): at 17:55

## 2022-01-03 RX ADMIN — MIRTAZAPINE 30 MILLIGRAM(S): 45 TABLET, ORALLY DISINTEGRATING ORAL at 23:33

## 2022-01-03 RX ADMIN — Medication 40 MILLIEQUIVALENT(S): at 17:55

## 2022-01-03 RX ADMIN — Medication 100 MILLIGRAM(S): at 23:33

## 2022-01-03 RX ADMIN — BUDESONIDE AND FORMOTEROL FUMARATE DIHYDRATE 2 PUFF(S): 160; 4.5 AEROSOL RESPIRATORY (INHALATION) at 18:54

## 2022-01-03 RX ADMIN — ATORVASTATIN CALCIUM 10 MILLIGRAM(S): 80 TABLET, FILM COATED ORAL at 23:33

## 2022-01-03 RX ADMIN — SODIUM CHLORIDE 1000 MILLILITER(S): 9 INJECTION, SOLUTION INTRAVENOUS at 10:52

## 2022-01-03 RX ADMIN — Medication 40 MILLIEQUIVALENT(S): at 23:33

## 2022-01-03 RX ADMIN — APIXABAN 5 MILLIGRAM(S): 2.5 TABLET, FILM COATED ORAL at 18:28

## 2022-01-03 RX ADMIN — Medication 0.2 MILLIGRAM(S): at 17:55

## 2022-01-03 RX ADMIN — CARVEDILOL PHOSPHATE 6.25 MILLIGRAM(S): 80 CAPSULE, EXTENDED RELEASE ORAL at 17:55

## 2022-01-03 RX ADMIN — Medication 324 MILLIGRAM(S): at 12:27

## 2022-01-03 NOTE — CONSULT NOTE ADULT - ATTENDING COMMENTS
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 Found to be covid positive. Cardio consulted for elevated troponins and CHF. Patient appears vol overloaded on exam with crackles on lung ascultation and +4 pitting lower ext edema. However, portable chest xray is clear. BNP and Troponins elevated x2 but flat, not clinically significant in setting of CKD.  No acute changes on EKG compared to previous. BP is controlled here.     Recommendations:    - takes lasix 60mg BID po at home. Hold for now. Will start on lasix 40 IV BID.   - Check BNP in 48 hrs  - check PA/lateral chest xray tomorrow  - Previous echo 10/21 showed: Normal LV size with normal LV systolic function with estimated LVEF 55-60%. LV diastolic function cannot determine due to atrial fibrillation. Mild mitral regurgitation is present. Mild aortic stenosis is  present. Mild tricuspid regurgitation is present . No evidence of any pulmonary hypertension    - Can consider repeating TTE  - Remote tele monitoring  - Recommend Pulm consult  - continue home BP meds with routine HD monitoring  - troponins flat. No need to trend further CE  - continue home eliquis for afib.      - monitor and replete lytes, keep K>4, Mg>2  - Other cardiovascular workup will depend on clinical course.  - All other workup per primary team  - Will follow

## 2022-01-03 NOTE — ED PROVIDER NOTE - OBJECTIVE STATEMENT
pt is a chronically ill male who has numerous comorbidities gib ckd chf anxiety afib hld dm "blood clots in brain", all listed on hx, presents from home with  3 days of shortness fo breath and swelling in his legs.  His pmd is dr Bliss  no fever no chills no cp

## 2022-01-03 NOTE — H&P ADULT - PROBLEM SELECTOR PLAN 4
Chronic  On tele monitoring  Continue home BP meds with hold parameters   Continue home Eliquis   Cardio Ruthy group following

## 2022-01-03 NOTE — H&P ADULT - PROBLEM SELECTOR PLAN 3
ARIELLA on CKD stage 3, Cr 2.3 poa baseline per chart review ~1.6-1.8  -s/p 1L LR x1  -Started 40mg IV Lasix BID as per Cardio recs  -Avoid nephrotoxic meds CKD stage 3-4, Cr 2.3 poa baseline per chart review ~1.6-1.8  -s/p 1L LR x1  -Started 40mg IV Lasix BID as per Cardio recs  -Avoid nephrotoxic meds  -Dr Rodarte consulted

## 2022-01-03 NOTE — CONSULT NOTE ADULT - ASSESSMENT
77 yo M PMH of A fib (on Eliquis) , MGUS s/p PRBC transfusion 10/21, anxiety/depression, CAD s/p stents (not on plavix), CHF, Diverticulosis, HLD, HTN, thyroid nodules, CKD stage 3, DM presents to the ED with SOB    Previous echo 10/21 showed: Normal LV size with normal LV systolic function with estimated LVEF 55-60%. LV diastolic function cannot determine due to atrial fibrillation  Obesity - exam and risk factors - and features - c/w ELMER - outpatient eval  AF - on AC -   D dimer noted - serial D dimer  cvs rx regimen - diuresis - cxr and proBNP c/w Vol Overload - Cardio eval in progress - old records and TTE reviewed - I and O, serial CE -   dietary discretion  Covid - in vaccinated pt - monitor VS and Sat - Acap and Robitussin PRN - monitor VS and HD and Sat - Decadron is indicated in covid with hypoxemia  pt is on Home Rx regimen of Symbicort - unclear indication - ex smoker - no history of COPD as per pt or medical record - prior CT without Emphysema  isolation for covid -

## 2022-01-03 NOTE — H&P ADULT - ATTENDING COMMENTS
77 yo M PMH of A fib (on Eliquis) , MGUS s/p PRBC transfusion 10/21, anxiety/depression, CAD s/p stents (not on plavix), CHF, Diverticulosis, HLD, HTN, thyroid nodules, CKD stage 3, DM presents to the ED with SOB and LE edema. Admitted for acute hypoxic respiratory failure likely 2/2  CHF exacerbation.      HPI as above.     T(C): 37.3 (01-03-22 @ 17:04), Max: 37.3 (01-03-22 @ 17:04)  HR: 72 (01-03-22 @ 17:04) (72 - 88)  BP: 177/95 (01-03-22 @ 17:04) (164/95 - 177/95)  RR: 18 (01-03-22 @ 17:04) (16 - 19)  SpO2: 97% (01-03-22 @ 17:04) (96% - 100%)  Wt(kg): --    Physical Exam:   GENERAL: well-groomed, well-developed, NAD  HEENT: head NC/AT; conjunctiva & sclera clear; hearing grossly intact, moist mucous membranes  NECK: supple, no JVD  RESPIRATORY: rales b/l lung bases, no wheezing  CARDIOVASCULAR: S1&S2, irreg irreg, no murmurs or gallops  ABDOMEN: soft, non-tender, non-distended, + Bowel sounds x4 quadrants, no guarding, rebound or rigidity  MUSCULOSKELETAL: b/l LE pitting edema 4+, no cyanosis noted.   LYMPH: no cervical lymphadenopathy  VASCULAR: Radial pulses 2+ bilaterally, no varicose veins   SKIN: warm and dry, color normal  NEUROLOGIC: AA&O X3, speech fluent no sensory loss, motor Strength 5/5 in UE & LE B/L  Psych: Normal mood and affect, normal behavior    Plan:   Acute hypoxic respiratory failure 2/2 CHF exacerbation;   -I suspect the hypoxia is from his volume overload  -will continue with diuresis andmonitor renal fxn and electrolytes.     COVID 19 infection: supportive care.   -oxygen as ordered, continuous pulse ox  -ID and pulm consulted    CKD IV: renal fxn appears at baseline, will have to monitor closely in setting of lasix administreation  -Dr walker consulted    Anemia: no signs or symptoms of active bleeding. Continue to monitor hgb.     A fib: continue eliquis  -rate controlled.     remainder as above.

## 2022-01-03 NOTE — H&P ADULT - ASSESSMENT
77 yo M PMH of A fib (on Eliquis) , MGUS s/p PRBC transfusion 10/21, anxiety/depression, CAD s/p stents (not on plavix), CHF, Diverticulosis, HLD, HTN, thyroid nodules, CKD stage 3, DM presents to the ED with SOB and LE edema. Admitted for acute hypoxic respiratory failure likely 2/2 COVID vs CHF exacerbation.   75 yo M PMH of A fib (on Eliquis) , MGUS s/p PRBC transfusion 10/21, anxiety/depression, CAD s/p stents (not on plavix), CHF, Diverticulosis, HLD, HTN, thyroid nodules, CKD stage 3, DM presents to the ED with SOB and LE edema. Admitted for acute hypoxic respiratory failure likely 2/2  CHF exacerbation.

## 2022-01-03 NOTE — ED ADULT NURSE NOTE - OBJECTIVE STATEMENT
Received pt in bed, pt alert but sleepy upon arrival.  Pt able to explain that he has been feeling SOB longer then a month worsening the last 3 days.   Pt also stated he had some left sided chest pain.  As per EMS room sat 86% ra.  Pt on 4L NC sat 96%.  pt also presents with cough.  Pt also has B/L leg swelling to lower extremities. EKG completed.  Pt on monitor.

## 2022-01-03 NOTE — H&P ADULT - PROBLEM SELECTOR PLAN 1
100% on 10 L/min NRB, likely 2/2 COVID-19 vs CHF exacerbation  Patient presents with fever, SOB likely 2/2 acute hypoxic respiratory failure 2/2 confirmed COVID-19 infection vs CHF exacerbation   - Admit to telemetry with continuous pulse ox   - Supplemental O2 prn maintain O2 sats >88%. Prone PRN  - Per ID, does not quality for remdesivir or decadron, may qualify if becomes hypoxic   - Monitor volume status closely, avoid aggressive hydration  - Tylenol prn myalgias, fever  - Avoid nebs. continue with MDI prn.  - CBC with diff and CMP QD. Ferritin, crp, d-dimer, procal at admission then will repeat If clinically worsening every 48-72 hours.  - Continue home Eliquis   - Isolation precautions per protocol  - Monitor fever curve, renal function  - GOC:   - ID (Dr. Ochoa) following, f/u recs  - Pulm consulted, f/u recs 100% on 10 L/min NRB, likely 2/2 CHF exacerbation  Patient presents with fever, SOB likely 2/2 acute hypoxic respiratory failure 2/2  CHF exacerbation   - Admit to telemetry with continuous pulse ox   - Supplemental O2 prn maintain O2 sats >88%. Prone PRN  - Per ID, does not quality for remdesivir or decadron  - Monitor volume status closely, avoid aggressive hydration  - Tylenol prn myalgias, fever  - Avoid nebs. continue with MDI prn.  - CBC with diff and CMP QD. Ferritin, crp, d-dimer, procal at admission then will repeat If clinically worsening every 48-72 hours.  - Continue home Eliquis   - Isolation precautions per protocol  - Monitor fever curve, renal function  - ID (Dr. Ochoa) following, f/u recs  - Pulm consulted, f/u recs

## 2022-01-03 NOTE — H&P ADULT - NSHPREVIEWOFSYSTEMS_GEN_ALL_CORE
CONSTITUTIONAL: admits to fatigue, denies fever, chills, weakness  HEENT: denies blurred vision, sore throat  SKIN: denies new lesions, rash  CARDIOVASCULAR: admits to left sided chest pain, non-radiating, denies palpitations  RESPIRATORY: admits to shortness of breath, wheezing and dry cough  GASTROINTESTINAL: denies nausea, vomiting, diarrhea, abdominal pain, hematochezia, melena   GENITOURINARY: denies dysuria, hematuria   NEUROLOGICAL: denies numbness, headache, focal weakness  MUSCULOSKELETAL: admits to chronic b/l knee pain, denies muscle aches  HEMATOLOGIC: denies gross bleeding, bruising  LYMPHATICS: admits to chronic LE edema, denies enlarged lymph nodes

## 2022-01-03 NOTE — CONSULT NOTE ADULT - ASSESSMENT
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 Cardio consulted for elevated troponins and CHF.   - No clear evidence of acute ischemia, trops negative x 2. will f/u third set, pt asymptomatic  - No evidence of volume overload  - No acute changes on EKG compared to previous  - Previous ECHO in 02/17 shows normal LV function with EF: 65%, no significant valvular disease noted  - BP well controlled, continue home BP meds, monitor routine hemodynamics  - monitor and replete lytes, keep K>4, Mg>2  - Pt has no history of MI, active CAD, ADHF or severe valvular disease and in the setting of low risk --- procedure, patient is optimized from cardiovascular standpoint to proceed with planned procedure with routine hemodynamic monitoring.   - Other cardiovascular workup will depend on clinical course.  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 Found to be covid positive. Cardio consulted for elevated troponins and CHF. Patient appears vol overoaded with crackles on lung ascultation and +4 pitting lower ext edema. Troponins elevated x2 likely due to demand ischemia in setting of CHF.   No acute changes on EKG compared to previous. BP is controlled here.     Recommendations:    CHF  - takes lasix 60mg BID po at home. Hold. Will start on lasix 40 IV BID.   - Previous echo 10/21 showed:Normal LV size with normal LV systolic function with estimated LVEF 55-60%. LV diastolic function cannot determine due to atrial fibrillation. Mild mitral regurgitation is present. Mild aortic stenosis is  present. Mild tricuspid regurgitation is present . No evidence of any pulmonary hypertension  - continue home BP meds with routine HD monitoring  - check additional set of CE, trend to peak  - continue home eliquis for afib.      - monitor and replete lytes, keep K>4, Mg>2  - Other cardiovascular workup will depend on clinical course.  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 Found to be covid positive. Cardio consulted for elevated troponins and CHF. Patient appears vol overloaded on exam with crackles on lung ascultation and +4 pitting lower ext edema. However, portable chest xray is clear. BNP and Troponins elevated x2 but flat, not clinically significant in setting of CKD.  No acute changes on EKG compared to previous. BP is controlled here.     Recommendations:    - takes lasix 60mg BID po at home. Hold for now. Will start on lasix 40 IV BID.   - Check BNP in 48 hrs  - check PA/lateral chest xray tomorrow  - Previous echo 10/21 showed: Normal LV size with normal LV systolic function with estimated LVEF 55-60%. LV diastolic function cannot determine due to atrial fibrillation. Mild mitral regurgitation is present. Mild aortic stenosis is  present. Mild tricuspid regurgitation is present . No evidence of any pulmonary hypertension    - Can consider repeating TTE  - Remote tele monitoring  - Recommend Pulm consult  - continue home BP meds with routine HD monitoring  - troponins flat. No need to trend further CE  - continue home eliquis for afib.      - monitor and replete lytes, keep K>4, Mg>2  - Other cardiovascular workup will depend on clinical course.  - All other workup per primary team  - Will follow

## 2022-01-03 NOTE — H&P ADULT - NSHPSOCIALHISTORY_GEN_ALL_CORE
Tobacco:  EtOH:   recreational drug use:   Lives with:   Ambulates:    ADLs:   Occupation:   Vaccinations: Tobacco: former smoker ~8 pack years, quit 45 years ago   EtOH: denies  recreational drug use: denies  Lives with: group home   Ambulates:  walker   ADLs: independent  Vaccinations: Moderna x2 (May 2021), denies flu vaccine

## 2022-01-03 NOTE — H&P ADULT - PROBLEM SELECTOR PLAN 5
Chronic, stable  Continue home Coreg, Isosorbide, Clonidine, Amlodipine, Hydralazine with hold parameters  Vitals per routine, hemodynamically stable

## 2022-01-03 NOTE — CONSULT NOTE ADULT - SUBJECTIVE AND OBJECTIVE BOX
Date/Time Patient Seen:  		  Referring MD:   Data Reviewed	       Patient is a 76y old  Male who presents with a chief complaint of Dyspnea (03 Jan 2022 15:57)      Subjective/HPI    History of Present Illness:  Reason for Admission: Dyspnea  History of Present Illness:   75 yo M PMH of A fib (on Eliquis) , MGUS s/p PRBC transfusion 10/21, anxiety/depression, CAD s/p stents (not on plavix), CHF, Diverticulosis, HLD, HTN, thyroid nodules, CKD stage 3, DM presents to the ED with SOB and LE edema. The patient lives in a group home and is unsure why the supervisor suggested he come to the ED. He reports difficulty breathing for a few months now, but it has been worsening over the last week. His shortness of breath is primarily on exertion. The patient also has wheezing and a dry cough. He states he has been sleeping in a sitting position, but does not understand why he had to come to the ED, states his LE swelling does not seem to be worse than his baseline. Also complaining of left sided chest pain which has been ongoing for a few days now, claims it to be 2/2 reflux. Denies radiation of the pain and diaphoresis. Denies fevers/chills, n/v/c/d, myalgias.     COVID-19 vaccinated with Moderna x2 in 5/2021, found to be COVID+  PAST MEDICAL & SURGICAL HISTORY:  Hypertension    Diabetes mellitus    Lupus    Hepatitis C    Anxiety and depression    CAD (coronary artery disease)  s/p stents    Diverticulosis    Hyperlipidemia    HTN (hypertension)  c/b multiple episodes of hypertensive urgency    HLD (hyperlipidemia)    Atrial fibrillation  first documented on EKG 10/7/2021    CAD (coronary artery disease)  s/p stents (not on plavix)    Type 2 diabetes mellitus  not on home insulin/ Meds    Anxiety  Depression  multiple psych medications    History of diverticulitis  07/2021    Diverticulosis  c/b GIB in 2020    Afib  on AC    Stage 3 chronic kidney disease    Anemia of chronic disease  Monoclonal Gammopathy-MGUS  pRBC transfusion 10/15/21    Chronic diastolic congestive heart failure    Multiple thyroid nodules    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  Had surgery ( April 2013 )    S/P tonsillectomy    S/P arthroscopic knee surgery  Bilateral ( 2005 )    Torsion of testicle  Had surgery at age 14    Pilonidal cyst  Had surgery ( 1969 )    S/P cataract surgery  Bilateral    H/O hernia repair    FAMILY HISTORY:  FHx: throat cancer  No family history of colorectal cancer.     Social History:  Social History (marital status, living situation, occupation, tobacco use, alcohol and drug use, and sexual history): Tobacco: former smoker ~8 pack years, quit 45 years ago   EtOH: denies  recreational drug use: denies  Lives with: group home   Ambulates:  walker   ADLs: independent  Vaccinations: Moderna x2 (May 2021), denies flu vaccine       Tobacco Screening:  · Core Measure Site	Yes  · Has the patient used tobacco in the past 30 days?	No     Risk Assessment:    Present on Admission:  Deep Venous Thrombosis	no  Pulmonary Embolus	no     Heart Failure:  Does this patient have a history of or has been diagnosed with heart failure? yes.     LV Function Assessment (LVS function was evaluated before arrival and/or during hospitalization) yes.     Is the Ejection Fraction >40% ? no.     Ejection Fraction 55 %.     Are there any contraindications to ACEI/ARB therapy? No.      Medication list         MEDICATIONS  (STANDING):  amLODIPine   Tablet 10 milliGRAM(s) Oral daily  apixaban 5 milliGRAM(s) Oral two times a day  ARIPiprazole 30 milliGRAM(s) Oral daily  aspirin enteric coated 81 milliGRAM(s) Oral daily  atorvastatin 10 milliGRAM(s) Oral at bedtime  budesonide 160 MICROgram(s)/formoterol 4.5 MICROgram(s) Inhaler 2 Puff(s) Inhalation two times a day  carvedilol 6.25 milliGRAM(s) Oral every 12 hours  cloNIDine 0.2 milliGRAM(s) Oral two times a day  cyanocobalamin 1000 MICROGram(s) Oral daily  famotidine    Tablet 20 milliGRAM(s) Oral daily  folic acid 1 milliGRAM(s) Oral daily  furosemide   Injectable 40 milliGRAM(s) IV Push two times a day  hydrALAZINE 100 milliGRAM(s) Oral three times a day  isosorbide   mononitrate ER Tablet (IMDUR) 90 milliGRAM(s) Oral daily  lamoTRIgine 100 milliGRAM(s) Oral daily  mirtazapine 30 milliGRAM(s) Oral at bedtime  oxybutynin 5 milliGRAM(s) Oral two times a day  pantoprazole    Tablet 40 milliGRAM(s) Oral before breakfast  venlafaxine XR. 150 milliGRAM(s) Oral daily    MEDICATIONS  (PRN):         Vitals log        ICU Vital Signs Last 24 Hrs  T(C): 36.1 (03 Jan 2022 10:18), Max: 36.1 (03 Jan 2022 10:18)  T(F): 97 (03 Jan 2022 10:18), Max: 97 (03 Jan 2022 10:18)  HR: 80 (03 Jan 2022 11:45) (80 - 88)  BP: 164/95 (03 Jan 2022 11:45) (164/95 - 172/86)  BP(mean): --  ABP: --  ABP(mean): --  RR: 19 (03 Jan 2022 11:45) (16 - 19)  SpO2: 96% (03 Jan 2022 11:45) (96% - 100%)           Input and Output:  I&O's Detail      Lab Data                        9.2    7.59  )-----------( 201      ( 03 Jan 2022 11:14 )             29.1     01-03    140  |  110<H>  |  28<H>  ----------------------------<  136<H>  3.3<L>   |  22  |  2.30<H>    Ca    8.2<L>      03 Jan 2022 11:14    TPro  6.5  /  Alb  3.0<L>  /  TBili  0.4  /  DBili  x   /  AST  18  /  ALT  28  /  AlkPhos  65  01-03            Review of Systems	  weakness  cough  sob  alicia      Objective     Physical Examination  heart s1s2  lung dec BS  abd soft  head nc  verbal  alert  tattoos  obese  cn grossly int        Pertinent Lab findings & Imaging      El:  NO   Adequate UO     I&O's Detail           Discussed with:     Cultures:	        Radiology      ACC: [id number] EXAM:  XR CHEST PORTABLE URGENT 1V                          PROCEDURE DATE:  01/03/2022          INTERPRETATION:  Chest portable.    Clinical History: Shortness of breath, cough and fever.    Comparison: 12/24/2021.    Single AP view submitted.    There is a prominent cardiac silhouette.    There is prominence of the bronchovascular markings at the bilateral   perihilar lower lung zones.  Again noted is minimal blunting at the right costophrenic angle which may   reflect trace pleural effusion and/or pleural thickening.    Impression:    Findings as discussed above.    --- End of Report ---            ALEXA STEARNS MD; Attending Radiologist  This document has been electronically signed. [place]  3 2022  3:51PM         EXAM:  ECHO TTE WO CON COMP W DOPP         PROCEDURE DATE:  10/13/2021        INTERPRETATION:  Ordering Physician: SKYLA TERRY [id number]  Indication: Cardiac arrhythmias.  Technician: INDIGO  Study Quality: Technical difficult study.  A complete echocardiographic study was performed utilizing standard protocol including spectral and color Doppler in all echocardiographic windows.  Height: 172cm  Weight: 102kg  BSA: 2.1m2  Blood Pressure: 150/90  MEASUREMENTS  IVS: 1.3cm  PWT: 1.3cm  LA: 4.2cm  AO: 3.5cm  LVIDd: 5.4 cm.  LVIDs: 3.5cm  LVEF: 55-60%.  PASP: 34mm Hg  FINDINGS  Left Ventricle: Normal in  size and normal LV systolic function with estimated LVEF 55-60%.  Right Ventricle: Normal in size and normal RV systolic function.  Left Atrium: Mild dilated.  Right Atrium: Normal in size.  Mitral Valve: Mild mitral annular calcification is present. Mitral valve is mildly calcified and no evidence of any mitral stenosis. Mild mitral regurgitation is present.  Aortic Valve: Aortic root is mild sclerotic and of normal dimension. Aortic valve is mildly calcified and mild  aortic stenosis is present with peak gradient across aortic valve is 30 mmHg. Aortic valve area not calculated.  Tricuspid Valve: Mild tricuspid regurgitation with calculated PA systolic pressure 34 mmHg is present. No evidence of any pulmonary hypertension  Pulmonic Valve: Trace Pulmonary regurgitation is present.  Diastolic Function: LV diastolic function cannot determine due to  underlying atrial fibrillation.  Pericardium/Pleura: No evidence of any pericardial effusion.  No intracardiac mass  thrombus or vegetations and  tumor.  Mild concentric left ventricular hypertrophy is present.  IMPRESSION:  Normal LV size with normal LV systolic function with estimated LVEF 55-60%.    LV diastolic function cannot determine due to atrial fibrillation.    Mild mitral regurgitation is present.    Mild aortic stenosis is  present.    Mild tricuspid regurgitation is present . No evidence of any pulmonary hypertension    Correlate clinically above findings.    --- End of Report ---              SKYLA TERRY MD; Attending Cardiologist  This document has been electronically signed. Oct 13 2021 11:55PM

## 2022-01-03 NOTE — CONSULT NOTE ADULT - SUBJECTIVE AND OBJECTIVE BOX
[facility]  INFECTIOUS DISEASES - [address], [place] [address]  Tel: [phone]     Fax: [phone]  =======================================================    [id number]  TRIPP ZARATE     CC: Patient is a 76y old  Male who presents with a chief complaint of Dyspnea (2022 14:17)    HPI:  75 yo M PMH of A fib, MGUS s/p PRBC transfusion 10/21, anxiety/depression, CAD s/p stents (not on plavix), CHF, Diverticulosis, HLD, HTN, thyroid nodules, CKD stage 3, DM, who presented with SOB and bilateral LE edema. Patient says he has had intermittent SOB for a long time but worse over the past few days. He denies any fevers or cough. He also says he has had bilateral LE edema for a while. Per records he was initially on NRB but currently on room air. He says he received Moderna COVID vaccine x 2.      PAST MEDICAL & SURGICAL HISTORY:  HTN (hypertension)  c/b multiple episodes of hypertensive urgency    HLD (hyperlipidemia)    Atrial fibrillation  first documented on EKG 10/7/2021    CAD (coronary artery disease)  s/p stents (not on plavix)    Type 2 diabetes mellitus  not on home insulin/ Meds    Anxiety  Depression  multiple psych medications    History of diverticulitis  2021    Diverticulosis  c/b GIB in     Afib  on AC    Stage 3 chronic kidney disease    Anemia of chronic disease  Monoclonal Gammopathy-MGUS  pRBC transfusion 10/15/21    Chronic diastolic congestive heart failure    Multiple thyroid nodules    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  Had surgery ( 2013 )    S/P tonsillectomy    S/P arthroscopic knee surgery  Bilateral (  )    Torsion of testicle  Had surgery at age 14    Pilonidal cyst  Had surgery (  )    S/P cataract surgery  Bilateral    H/O hernia repair        Social Hx:     FAMILY HISTORY:  FHx: throat cancer    No family history of colorectal cancer        Allergies    No Known Allergies    Intolerances        Antibiotics:  MEDICATIONS  (STANDING):    MEDICATIONS  (PRN):       REVIEW OF SYSTEMS:  CONSTITUTIONAL:  No Fevers  HEENT:  No sore throat or runny nose.  CARDIOVASCULAR:  No chest pain   RESPIRATORY:  see history  GASTROINTESTINAL:  (+) loose stools, No nausea, vomiting.  GENITOURINARY:  No dysuria  NEUROLOGIC:  No dizziness  PSYCHIATRIC:  No disorder of thought or mood.    Physical Exam:  Vital Signs Last 24 Hrs  T(C): 36.1 (2022 10:18), Max: 36.1 (2022 10:18)  T(F): 97 (2022 10:18), Max: 97 (2022 10:18)  HR: 80 (2022 11:45) (80 - 88)  BP: 164/95 (2022 11:45) (164/95 - 172/86)  BP(mean): --  RR: 19 (2022 11:45) (16 - 19)  SpO2: 96% (2022 11:45) (96% - 100%)  Height (cm): 172.7 ( @ 14:17)    GEN: NAD  HEENT: normocephalic and atraumatic.  NECK: Supple.   LUNGS: Clear to auscultation.  HEART: Regular rate and rhythm  ABDOMEN: Soft, nontender  EXTREMITIES: bipedal edema, no erythema  NEUROLOGIC: grossly intact.  PSYCHIATRIC: Appropriate affect .    Labs:      140  |  110<H>  |  28<H>  ----------------------------<  136<H>  3.3<L>   |  22  |  2.30<H>    Ca    8.2<L>      2022 11:14    TPro  6.5  /  Alb  3.0<L>  /  TBili  0.4  /  DBili  x   /  AST  18  /  ALT  28  /  AlkPhos  65                            9.2    7.59  )-----------( 201      ( 2022 11:14 )             29.1     PT/INR - ( 2022 11:14 )   PT: 16.9 sec;   INR: 1.47 ratio         PTT - ( 2022 11:14 )  PTT:41.2 sec  Urinalysis Basic - ( 2022 11:33 )    Color: Pale Yellow / Appearance: Clear / S.020 / pH: x  Gluc: x / Ketone: Negative  / Bili: Negative / Urobili: Negative   Blood: x / Protein: 500 mg/dL / Nitrite: Negative   Leuk Esterase: Negative / RBC: 0-2 /HPF / WBC x   Sq Epi: x / Non Sq Epi: Occasional / Bacteria: Occasional      LIVER FUNCTIONS - ( 2022 11:14 )  Alb: 3.0 g/dL / Pro: 6.5 g/dL / ALK PHOS: 65 U/L / ALT: 28 U/L / AST: 18 U/L / GGT: x                 Procalcitonin, Serum: 0.20 ng/mL (22 @ 11:14)          SARS-CoV-2 Result: Detected (22 @ 11:11)  SARS-CoV-2 Result: NotDetec (21 @ 01:02)      RECENT CULTURES:   @ 04:00 .Blood Blood-Peripheral     No Growth Final              All imaging and other data have been reviewed.    CXR:  There is a prominent cardiac silhouette.    There is prominence of the bronchovascular markings at the bilateral   perihilar lower lung zones.  Again noted is minimal blunting at the right costophrenic angle which may   reflect trace pleural effusion and/or pleural thickening.    Impression:    Findings as discussed above.

## 2022-01-03 NOTE — H&P ADULT - HISTORY OF PRESENT ILLNESS
77 yo M PMH of A fib (on ) , MGUS s/p PRBC transfusion 10/21, anxiety/depression, CAD s/p stents (not on plavix), CHF, Diverticulosis, HLD, HTN, thyroid nodules, CKD stage 3, DM presents to the ED with SOB and LE edema.     ED course:   Vitals:   BP:      HR:  Temp:  RR:, O2:   Patient received:  Labs:   EKG:   75 yo M PMH of A fib (on ) , MGUS s/p PRBC transfusion 10/21, anxiety/depression, CAD s/p stents (not on plavix), CHF, Diverticulosis, HLD, HTN, thyroid nodules, CKD stage 3, DM presents to the ED with SOB and LE edema.     ED course:   Vitals:  BP:172/86    HR:88   Temp:97F   RR:16  O2: 100% on 10L/min NRB   Patient received: 324mg ASA x1, 1L LR x1  Labs: H/H 9.2/29.1, INR 1.47, Troponin I 114.7--> 117.8, K+ 3.3, BUN/Cr 28/2.30, Procal 0.20, BNP 4574  UA: occasional bacteria, small blood, 500 protein, 0-2 granular casts   EKG:  CXR (my read): b/l pulmonary infiltrates, worsened since prior (12/24) 75 yo M PMH of A fib (on Eliquis) , MGUS s/p PRBC transfusion 10/21, anxiety/depression, CAD s/p stents (not on plavix), CHF, Diverticulosis, HLD, HTN, thyroid nodules, CKD stage 3, DM presents to the ED with SOB and LE edema. The patient lives in a group home and is unsure why the supervisor suggested he come to the ED. He reports difficulty breathing for a few months now, but it has been worsening over the last week. His shortness of breath is primarily on exertion. The patient also has wheezing and a dry cough. He states he has been sleeping in a sitting position, but does not understand why he had to come to the ED, states his LE swelling does not seem to be worse than his baseline. Also complaining of left sided chest pain which has been ongoing for a few days now, claims it to be 2/2 reflux. Denies radiation of the pain and diaphoresis. Denies fevers/chills, n/v/c/d, myalgias.     COVID-19 vaccinated with Moderna x2 in 5/2021, found to be COVID+    ED course:   Vitals:  BP:172/86    HR:88   Temp:97F   RR:16  O2: 100% on 10L/min NRB   Patient received: 324mg ASA x1, 1L LR x1  Labs: H/H 9.2/29.1, INR 1.47, Troponin I 114.7--> 117.8, K+ 3.3, BUN/Cr 28/2.30, Procal 0.20, BNP 4574  UA: occasional bacteria, small blood, 500 protein, 0-2 granular casts   EKG: Afib, Incomplete RBBB, Moderate voltage criteria for LVH, may be normal variant nonspecific T wave abnormality, Prolonged QT  CXR: There is a prominent cardiac silhouette and of the bronchovascular markings at the bilateral perihilar lower lung zones. Again noted is minimal blunting at the right costophrenic angle which may reflect trace pleural effusion and/or pleural thickening. 75 yo M PMH of A fib (on Eliquis) , MGUS s/p PRBC transfusion 10/21, anxiety/depression, CAD s/p stents (not on plavix), CHF, Diverticulosis, HLD, HTN, thyroid nodules, CKD stage 3, DM2 presents to the ED with SOB and LE edema. The patient lives in a group home and is unsure why the supervisor suggested he come to the ED. He reports difficulty breathing for a few months now, but it has been worsening over the last week. His shortness of breath is primarily on exertion. The patient also has wheezing and a dry cough. He states he has been sleeping in a sitting position, but does not understand why he had to come to the ED, states his LE swelling does not seem to be worse than his baseline. Also complaining of left sided chest pain which has been ongoing for a few days now, claims it to be 2/2 reflux. Denies radiation of the pain and diaphoresis. Denies fevers/chills, n/v/c/d, myalgias.     COVID-19 vaccinated with Moderna x2 in 5/2021, found to be COVID+    ED course:   Vitals:  BP:172/86    HR:88   Temp:97F   RR:16  O2: 100% on 10L/min NRB   Patient received: 324mg ASA x1, 1L LR x1  Labs: H/H 9.2/29.1, INR 1.47, Troponin I 114.7--> 117.8, K+ 3.3, BUN/Cr 28/2.30, Procal 0.20, BNP 4574  UA: occasional bacteria, small blood, 500 protein, 0-2 granular casts   EKG: Afib, Incomplete RBBB, Moderate voltage criteria for LVH, may be normal variant nonspecific T wave abnormality, Prolonged QT  CXR: There is a prominent cardiac silhouette and of the bronchovascular markings at the bilateral perihilar lower lung zones. Again noted is minimal blunting at the right costophrenic angle which may reflect trace pleural effusion and/or pleural thickening.

## 2022-01-03 NOTE — H&P ADULT - NSHPPHYSICALEXAM_GEN_ALL_CORE
T(C): 36.1 (01-03-22 @ 10:18), Max: 36.1 (01-03-22 @ 10:18)  HR: 80 (01-03-22 @ 11:45) (80 - 88)  BP: 164/95 (01-03-22 @ 11:45) (164/95 - 172/86)  RR: 19 (01-03-22 @ 11:45) (16 - 19)  SpO2: 96% (01-03-22 @ 11:45) (96% - 100%)    GENERAL: patient appears to have increased work of breathing, in no acute distress, appropriate, pleasant  EYES: sclera clear, no exudates, EOMI  NECK: supple, soft, no thyromegaly noted  LUNGS: decreased breath sounds b/l, no wheezing or rhonchi appreciated  HEART: soft S1/S2, regular rate and rhythm, no murmurs noted, b/l LE pitting edema  GASTROINTESTINAL: abdomen is soft, nontender, distended, normoactive bowel sounds, no palpable masses  INTEGUMENT: warm & dry   MUSCULOSKELETAL: no clubbing or cyanosis, no obvious deformity  NEUROLOGIC: awake, alert, oriented x3, moving all extremities   HEME/LYMPH: no palpable supraclavicular nodules, no obvious ecchymosis or petechiae

## 2022-01-03 NOTE — ED PROVIDER NOTE - PROGRESS NOTE DETAILS
consulted dr chandler cardiology- for the chf and pos trops. recc rpt trop and will see in consult hospitalst paged for admission

## 2022-01-03 NOTE — H&P ADULT - PROBLEM SELECTOR PLAN 2
Likely acute on chronic exacerbation    -CXR: prominent cardiac silhouette and of the bronchovascular markings at the bilateral perihilar lower lung zones. Minimal blunting at the right costophrenic angle which may reflect trace pleural effusion and/or pleural thickening.  -Last echo 10/21: EF 55-60%, normal LV systolic function & size. LV diastolic function can't be determined 2/2 afib. Mild MR, mild AS, mild TR present.   -Elevated trops, per Cardio, no need to trend further   -BNP elevated @4574  -F/u repeat TTE and PA/lateral CXR  -strict I/Os  -daily weights  -Dash/ TLC Diet  -Cardio [facility] group consulted: hold home Lasix, start Lasix 40mg IV BID

## 2022-01-03 NOTE — ED PROVIDER NOTE - CLINICAL SUMMARY MEDICAL DECISION MAKING FREE TEXT BOX
chronically ill male with chf afib ckd now edematous with suprapubic bladder pain  ro retention ro chf ro covid ro acs  xry labs ekg monitor oxygen as his pre ems oxygen saturation was low and quickly joseph with non rebrether oxygen admit

## 2022-01-03 NOTE — H&P ADULT - PROBLEM SELECTOR PLAN 8
Diabetes type II (not on home insulin or oral meds)  Insulin Corrective Scale  Finger sticks per routine  Consistent Carb Diet  Hemoglobin A1c in AM  Hypoglycemia protocol

## 2022-01-03 NOTE — H&P ADULT - PROBLEM SELECTOR PLAN 10
H/H 9.2/29.1 poa, baseline per chart review ~8.5  Continue home folic acid, vitamin B12 H/H 9.2/29.1 poa, baseline per chart review ~8.5  Continue home folic acid, vitamin B12  no signs or symptoms of active bleeding. Continue to monitor hgb.

## 2022-01-03 NOTE — CONSULT NOTE ADULT - ASSESSMENT
75 yo M PMH of A fib, MGUS s/p PRBC transfusion 10/21, anxiety/depression, CAD s/p stents (not on plavix), CHF, Diverticulosis, HLD, HTN, thyroid nodules, CKD stage 3, DM, who presented with SOB and bilateral LE edema.     Symptoms probably related to being volume overloaded, but patient also COVID positive. He has no leukocytosis and serum procalcitonin is 0.2. He has no fevers and is currently on room air, does not qualify for steroids or remdesivir.    -continue supportive management, if becomes hypoxic would start remdesivir and steroids  -AC per institution protocol  -will sign off, please call ID if any further questions. Thank you.    Thank you for courtesy of this consult.     Cheyenne Ochoa MD  Division of Infectious Diseases   Cell [phone] between 8am and 6pm   After 6pm and weekends please call ID service at [phone].

## 2022-01-03 NOTE — CONSULT NOTE ADULT - SUBJECTIVE AND OBJECTIVE BOX
[facility]         Fifi Bliss, Bonifacio, Kalpesh, Fatimah, Loc Rosario        [phone] (office)    Reason for Consult:    Interval HPI: Patient seen and examined at bedside. No acute events overnight.     HPI:      PAST MEDICAL & SURGICAL HISTORY:  HTN (hypertension)  c/b multiple episodes of hypertensive urgency    HLD (hyperlipidemia)    Atrial fibrillation  first documented on EKG 10/7/2021    CAD (coronary artery disease)  s/p stents (not on plavix)    Type 2 diabetes mellitus  not on home insulin/ Meds    Anxiety  Depression  multiple psych medications    History of diverticulitis  07/2021    Diverticulosis  c/b GIB in 2020    Afib  on AC    Stage 3 chronic kidney disease    Anemia of chronic disease  Monoclonal Gammopathy-MGUS  pRBC transfusion 10/15/21    Chronic diastolic congestive heart failure    Multiple thyroid nodules    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  Had surgery ( April 2013 )    S/P tonsillectomy    S/P arthroscopic knee surgery  Bilateral ( 2005 )    Torsion of testicle  Had surgery at age 14    Pilonidal cyst  Had surgery ( 1969 )    S/P cataract surgery  Bilateral    H/O hernia repair        SOCIAL HISTORY: No active tobacco, alcohol or illicit drug use    FAMILY HISTORY:  FHx: throat cancer    No family history of colorectal cancer        Home Medications:      MEDICATIONS  (STANDING):    MEDICATIONS  (PRN):      Allergies    No Known Allergies    Intolerances        REVIEW OF SYSTEMS: Negative except as per HPI.    VITAL SIGNS:   Vital Signs Last 24 Hrs  T(C): 36.1 (03 Jan 2022 10:18), Max: 36.1 (03 Jan 2022 10:18)  T(F): 97 (03 Jan 2022 10:18), Max: 97 (03 Jan 2022 10:18)  HR: 80 (03 Jan 2022 11:45) (80 - 88)  BP: 164/95 (03 Jan 2022 11:45) (164/95 - 172/86)  BP(mean): --  RR: 19 (03 Jan 2022 11:45) (16 - 19)  SpO2: 96% (03 Jan 2022 11:45) (96% - 100%)    I&O's Summary      PHYSICAL EXAM:  Constitutional: NAD, well-developed  HEENT NC/AT, moist mucous membranes  Pulmonary: Non-labored, breath sounds are clear bilaterally, no wheezing, rales or rhonchi  Cardiovascular: +S1, S2, RRR, no murmur  Gastrointestinal: Soft, nontender, nondistended, normoactive bowel sounds  Extremities: No peripheral edema   Neurological: Alert, strength and sensitivity are grossly intact  Skin: No obvious lesions/rashes  Psych: Mood & affect appropriate    LABS: All Labs Reviewed:                        9.2    7.59  )-----------( 201      ( 03 Jan 2022 11:14 )             29.1     03 Jan 2022 11:14    140    |  110    |  28     ----------------------------<  136    3.3     |  22     |  2.30     Ca    8.2        03 Jan 2022 11:14    TPro  6.5    /  Alb  3.0    /  TBili  0.4    /  DBili  x      /  AST  18     /  ALT  28     /  AlkPhos  65     03 Jan 2022 11:14    PT/INR - ( 03 Jan 2022 11:14 )   PT: 16.9 sec;   INR: 1.47 ratio         PTT - ( 03 Jan 2022 11:14 )  PTT:41.2 sec      Blood Culture:   01-03 @ 11:14  Pro Bnp 4574        EKG:    RADIOLOGY:    CXR:   [facility]         Fifi Bliss, Bonifacio, Kalpesh, Fatimah, Loc Rosario        [phone] (office)    Reason for Consult:    Interval HPI: Patient seen and examined at bedside. No acute events overnight.     HPI: 77 yo M with PMH of Type 2 DM (not on insulin), BPH, CAD s/p stents >4 years ago (not on Plavix), diverticulitis (hospitalized 07/2020 at [facility]), GIB 2/2 diverticulosis (2020), anxiety, Lupus, HTN, HLD, CKD stage 3, HepC, hx of blood clots in brain (s/p surgery 2013) living in a group home [facility] presenting to [facility] today with shortness of breath and leg swelling. Patient is a poor historian. I called group home for history. Per staff member patient noted to have heavy breathing and wheezing for past week. This am noted to be disoriented and confused. Staff member called EMS to have evaluated. Patient with numeruous recent admissions to [facility] for CHF, hypertensive urgency, atypical chest pain. Patient seen and examined at bedside. Patient denies any chest pain or dyspnea but states he does not feel like him self.       PAST MEDICAL & SURGICAL HISTORY:  HTN (hypertension)  c/b multiple episodes of hypertensive urgency    HLD (hyperlipidemia)    Atrial fibrillation  first documented on EKG 10/7/2021    CAD (coronary artery disease)  s/p stents (not on plavix)    Type 2 diabetes mellitus  not on home insulin/ Meds    Anxiety  Depression  multiple psych medications    History of diverticulitis  07/2021    Diverticulosis  c/b GIB in 2020    Afib  on AC    Stage 3 chronic kidney disease    Anemia of chronic disease  Monoclonal Gammopathy-MGUS  pRBC transfusion 10/15/21    Chronic diastolic congestive heart failure    Multiple thyroid nodules    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  Had surgery ( April 2013 )    S/P tonsillectomy    S/P arthroscopic knee surgery  Bilateral ( 2005 )    Torsion of testicle  Had surgery at age 14    Pilonidal cyst  Had surgery ( 1969 )    S/P cataract surgery  Bilateral    H/O hernia repair        SOCIAL HISTORY: No active tobacco, alcohol or illicit drug use    FAMILY HISTORY:  FHx: throat cancer    No family history of colorectal cancer        Home Medications:      MEDICATIONS  (STANDING):    MEDICATIONS  (PRN):      Allergies    No Known Allergies    Intolerances        REVIEW OF SYSTEMS: Negative except as per HPI.    VITAL SIGNS:   Vital Signs Last 24 Hrs  T(C): 36.1 (03 Jan 2022 10:18), Max: 36.1 (03 Jan 2022 10:18)  T(F): 97 (03 Jan 2022 10:18), Max: 97 (03 Jan 2022 10:18)  HR: 80 (03 Jan 2022 11:45) (80 - 88)  BP: 164/95 (03 Jan 2022 11:45) (164/95 - 172/86)  BP(mean): --  RR: 19 (03 Jan 2022 11:45) (16 - 19)  SpO2: 96% (03 Jan 2022 11:45) (96% - 100%)    I&O's Summary      PHYSICAL EXAM:  Constitutional: NAD, well-developed  HEENT NC/AT, moist mucous membranes  Pulmonary: Non-labored, crackles heard b/l bases  Cardiovascular: +S1, S2, RRR, no murmur  Gastrointestinal: Soft, nontender, nondistended, normoactive bowel sounds  Extremities: +4 pitting edema b/l lower ext   Neurological: AAOx1-2 strength and sensitivity are grossly intact  Skin: No obvious lesions/rashes  Psych: Mood & affect appropriate    LABS: All Labs Reviewed:                        9.2    7.59  )-----------( 201      ( 03 Jan 2022 11:14 )             29.1     03 Jan 2022 11:14    140    |  110    |  28     ----------------------------<  136    3.3     |  22     |  2.30     Ca    8.2        03 Jan 2022 11:14    TPro  6.5    /  Alb  3.0    /  TBili  0.4    /  DBili  x      /  AST  18     /  ALT  28     /  AlkPhos  65     03 Jan 2022 11:14    PT/INR - ( 03 Jan 2022 11:14 )   PT: 16.9 sec;   INR: 1.47 ratio         PTT - ( 03 Jan 2022 11:14 )  PTT:41.2 sec      Blood Culture:   01-03 @ 11:14  Pro Bnp 4574        EKG: Afib at 94 bpm, incomplete RBBB, nonspecific t wave abnormalities

## 2022-01-04 LAB
A1C WITH ESTIMATED AVERAGE GLUCOSE RESULT: 6.2 % — HIGH (ref 4–5.6)
ALBUMIN SERPL ELPH-MCNC: 2.7 G/DL — LOW (ref 3.3–5)
ALP SERPL-CCNC: 59 U/L — SIGNIFICANT CHANGE UP (ref 40–120)
ALT FLD-CCNC: 27 U/L — SIGNIFICANT CHANGE UP (ref 12–78)
ANION GAP SERPL CALC-SCNC: 9 MMOL/L — SIGNIFICANT CHANGE UP (ref 5–17)
AST SERPL-CCNC: 17 U/L — SIGNIFICANT CHANGE UP (ref 15–37)
BASE EXCESS BLDV CALC-SCNC: -6.9 MMOL/L — LOW (ref -2–3)
BASOPHILS # BLD AUTO: 0.02 K/UL — SIGNIFICANT CHANGE UP (ref 0–0.2)
BASOPHILS NFR BLD AUTO: 0.3 % — SIGNIFICANT CHANGE UP (ref 0–2)
BILIRUB SERPL-MCNC: 0.4 MG/DL — SIGNIFICANT CHANGE UP (ref 0.2–1.2)
BLOOD GAS COMMENTS, VENOUS: SIGNIFICANT CHANGE UP
BUN SERPL-MCNC: 34 MG/DL — HIGH (ref 7–23)
CALCIUM SERPL-MCNC: 8.3 MG/DL — LOW (ref 8.5–10.1)
CHLORIDE SERPL-SCNC: 107 MMOL/L — SIGNIFICANT CHANGE UP (ref 96–108)
CO2 SERPL-SCNC: 23 MMOL/L — SIGNIFICANT CHANGE UP (ref 22–31)
CREAT SERPL-MCNC: 2.2 MG/DL — HIGH (ref 0.5–1.3)
CRP SERPL-MCNC: 62 MG/L — HIGH
CULTURE RESULTS: NO GROWTH — SIGNIFICANT CHANGE UP
EOSINOPHIL # BLD AUTO: 0.08 K/UL — SIGNIFICANT CHANGE UP (ref 0–0.5)
EOSINOPHIL NFR BLD AUTO: 1.3 % — SIGNIFICANT CHANGE UP (ref 0–6)
ESTIMATED AVERAGE GLUCOSE: 131 MG/DL — HIGH (ref 68–114)
GLUCOSE SERPL-MCNC: 181 MG/DL — HIGH (ref 70–99)
HCO3 BLDV-SCNC: 23 MMOL/L — SIGNIFICANT CHANGE UP (ref 22–29)
HCT VFR BLD CALC: 27 % — LOW (ref 39–50)
HGB BLD-MCNC: 8.5 G/DL — LOW (ref 13–17)
IMM GRANULOCYTES NFR BLD AUTO: 0.3 % — SIGNIFICANT CHANGE UP (ref 0–1.5)
LYMPHOCYTES # BLD AUTO: 0.54 K/UL — LOW (ref 1–3.3)
LYMPHOCYTES # BLD AUTO: 9 % — LOW (ref 13–44)
MAGNESIUM SERPL-MCNC: 2.1 MG/DL — SIGNIFICANT CHANGE UP (ref 1.6–2.6)
MCHC RBC-ENTMCNC: 27.5 PG — SIGNIFICANT CHANGE UP (ref 27–34)
MCHC RBC-ENTMCNC: 31.5 GM/DL — LOW (ref 32–36)
MCV RBC AUTO: 87.4 FL — SIGNIFICANT CHANGE UP (ref 80–100)
MONOCYTES # BLD AUTO: 0.54 K/UL — SIGNIFICANT CHANGE UP (ref 0–0.9)
MONOCYTES NFR BLD AUTO: 9 % — SIGNIFICANT CHANGE UP (ref 2–14)
NEUTROPHILS # BLD AUTO: 4.83 K/UL — SIGNIFICANT CHANGE UP (ref 1.8–7.4)
NEUTROPHILS NFR BLD AUTO: 80.1 % — HIGH (ref 43–77)
NRBC # BLD: 0 /100 WBCS — SIGNIFICANT CHANGE UP (ref 0–0)
PCO2 BLDV: 78 MMHG — HIGH (ref 42–55)
PH BLDV: 7.08 — LOW (ref 7.32–7.43)
PHOSPHATE SERPL-MCNC: 3.5 MG/DL — SIGNIFICANT CHANGE UP (ref 2.5–4.5)
PLATELET # BLD AUTO: 171 K/UL — SIGNIFICANT CHANGE UP (ref 150–400)
PO2 BLDV: 78 MMHG — HIGH (ref 25–45)
POTASSIUM SERPL-MCNC: 3.8 MMOL/L — SIGNIFICANT CHANGE UP (ref 3.5–5.3)
POTASSIUM SERPL-SCNC: 3.8 MMOL/L — SIGNIFICANT CHANGE UP (ref 3.5–5.3)
PROT SERPL-MCNC: 6.3 G/DL — SIGNIFICANT CHANGE UP (ref 6–8.3)
RBC # BLD: 3.09 M/UL — LOW (ref 4.2–5.8)
RBC # FLD: 17.6 % — HIGH (ref 10.3–14.5)
SAO2 % BLDV: 93.3 % — HIGH (ref 67–88)
SODIUM SERPL-SCNC: 139 MMOL/L — SIGNIFICANT CHANGE UP (ref 135–145)
SPECIMEN SOURCE: SIGNIFICANT CHANGE UP
WBC # BLD: 6.03 K/UL — SIGNIFICANT CHANGE UP (ref 3.8–10.5)
WBC # FLD AUTO: 6.03 K/UL — SIGNIFICANT CHANGE UP (ref 3.8–10.5)

## 2022-01-04 PROCEDURE — 99233 SBSQ HOSP IP/OBS HIGH 50: CPT

## 2022-01-04 PROCEDURE — 99232 SBSQ HOSP IP/OBS MODERATE 35: CPT

## 2022-01-04 PROCEDURE — 71045 X-RAY EXAM CHEST 1 VIEW: CPT | Mod: 26

## 2022-01-04 RX ORDER — REMDESIVIR 5 MG/ML
200 INJECTION INTRAVENOUS EVERY 24 HOURS
Refills: 0 | Status: COMPLETED | OUTPATIENT
Start: 2022-01-04 | End: 2022-01-04

## 2022-01-04 RX ORDER — ACETAMINOPHEN 500 MG
650 TABLET ORAL EVERY 6 HOURS
Refills: 0 | Status: DISCONTINUED | OUTPATIENT
Start: 2022-01-04 | End: 2022-01-18

## 2022-01-04 RX ORDER — REMDESIVIR 5 MG/ML
100 INJECTION INTRAVENOUS EVERY 24 HOURS
Refills: 0 | Status: COMPLETED | OUTPATIENT
Start: 2022-01-05 | End: 2022-01-08

## 2022-01-04 RX ORDER — DEXAMETHASONE 0.5 MG/5ML
6 ELIXIR ORAL DAILY
Refills: 0 | Status: DISCONTINUED | OUTPATIENT
Start: 2022-01-04 | End: 2022-01-16

## 2022-01-04 RX ORDER — REMDESIVIR 5 MG/ML
INJECTION INTRAVENOUS
Refills: 0 | Status: COMPLETED | OUTPATIENT
Start: 2022-01-04 | End: 2022-01-08

## 2022-01-04 RX ADMIN — Medication 40 MILLIGRAM(S): at 17:05

## 2022-01-04 RX ADMIN — Medication 650 MILLIGRAM(S): at 21:32

## 2022-01-04 RX ADMIN — ATORVASTATIN CALCIUM 10 MILLIGRAM(S): 80 TABLET, FILM COATED ORAL at 21:32

## 2022-01-04 RX ADMIN — CARVEDILOL PHOSPHATE 6.25 MILLIGRAM(S): 80 CAPSULE, EXTENDED RELEASE ORAL at 17:05

## 2022-01-04 RX ADMIN — Medication 0.2 MILLIGRAM(S): at 17:05

## 2022-01-04 RX ADMIN — Medication 100 MILLIGRAM(S): at 13:27

## 2022-01-04 RX ADMIN — Medication 100 MILLIGRAM(S): at 21:32

## 2022-01-04 RX ADMIN — CARVEDILOL PHOSPHATE 6.25 MILLIGRAM(S): 80 CAPSULE, EXTENDED RELEASE ORAL at 06:01

## 2022-01-04 RX ADMIN — PREGABALIN 1000 MICROGRAM(S): 225 CAPSULE ORAL at 11:26

## 2022-01-04 RX ADMIN — APIXABAN 5 MILLIGRAM(S): 2.5 TABLET, FILM COATED ORAL at 17:05

## 2022-01-04 RX ADMIN — Medication 1 MILLIGRAM(S): at 11:26

## 2022-01-04 RX ADMIN — Medication 100 MILLIGRAM(S): at 06:01

## 2022-01-04 RX ADMIN — Medication 650 MILLIGRAM(S): at 14:35

## 2022-01-04 RX ADMIN — MIRTAZAPINE 30 MILLIGRAM(S): 45 TABLET, ORALLY DISINTEGRATING ORAL at 21:32

## 2022-01-04 RX ADMIN — REMDESIVIR 500 MILLIGRAM(S): 5 INJECTION INTRAVENOUS at 21:32

## 2022-01-04 RX ADMIN — APIXABAN 5 MILLIGRAM(S): 2.5 TABLET, FILM COATED ORAL at 06:01

## 2022-01-04 RX ADMIN — FAMOTIDINE 20 MILLIGRAM(S): 10 INJECTION INTRAVENOUS at 11:26

## 2022-01-04 RX ADMIN — Medication 81 MILLIGRAM(S): at 11:25

## 2022-01-04 RX ADMIN — Medication 5 MILLIGRAM(S): at 06:01

## 2022-01-04 RX ADMIN — Medication 0.2 MILLIGRAM(S): at 06:01

## 2022-01-04 RX ADMIN — AMLODIPINE BESYLATE 10 MILLIGRAM(S): 2.5 TABLET ORAL at 06:01

## 2022-01-04 RX ADMIN — Medication 150 MILLIGRAM(S): at 11:26

## 2022-01-04 RX ADMIN — ARIPIPRAZOLE 30 MILLIGRAM(S): 15 TABLET ORAL at 11:26

## 2022-01-04 RX ADMIN — Medication 5 MILLIGRAM(S): at 17:05

## 2022-01-04 RX ADMIN — Medication 650 MILLIGRAM(S): at 13:39

## 2022-01-04 RX ADMIN — Medication 40 MILLIGRAM(S): at 06:00

## 2022-01-04 RX ADMIN — PANTOPRAZOLE SODIUM 40 MILLIGRAM(S): 20 TABLET, DELAYED RELEASE ORAL at 06:01

## 2022-01-04 RX ADMIN — LAMOTRIGINE 100 MILLIGRAM(S): 25 TABLET, ORALLY DISINTEGRATING ORAL at 11:25

## 2022-01-04 RX ADMIN — ISOSORBIDE MONONITRATE 90 MILLIGRAM(S): 60 TABLET, EXTENDED RELEASE ORAL at 11:26

## 2022-01-04 RX ADMIN — BUDESONIDE AND FORMOTEROL FUMARATE DIHYDRATE 2 PUFF(S): 160; 4.5 AEROSOL RESPIRATORY (INHALATION) at 13:28

## 2022-01-04 NOTE — PATIENT PROFILE ADULT - FALL HARM RISK - HARM RISK INTERVENTIONS

## 2022-01-04 NOTE — CONSULT NOTE ADULT - SUBJECTIVE AND OBJECTIVE BOX
NEPHROLOGY CONSULTATION    CHIEF COMPLAINT:  CKD      HPI:  Worsening SOB on exertion x 1 week associated with wheezing and dry cough.  NO fever.  Requires supplemental 4 liters O2.   Renal function at recent baseline.       ROS:  admits to chronic LE edema      PAST MEDICAL & SURGICAL HISTORY:  HTN (hypertension)  c/b multiple episodes of hypertensive urgency    HLD (hyperlipidemia)    Atrial fibrillation  first documented on EKG 10/7/2021    CAD (coronary artery disease)  s/p stents (not on plavix)    Type 2 diabetes mellitus  not on home insulin/ Meds    Anxiety  Depression  multiple psych medications    History of diverticulitis  2021    Diverticulosis  c/b GIB in     Afib  on AC    Stage 3 chronic kidney disease    Anemia of chronic disease  Monoclonal Gammopathy-MGUS  pRBC transfusion 10/15/21    Chronic diastolic congestive heart failure    Multiple thyroid nodules    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  Had surgery ( 2013 )    S/P tonsillectomy    S/P arthroscopic knee surgery  Bilateral (  )    Torsion of testicle  Had surgery at age 14    Pilonidal cyst  Had surgery (  )    S/P cataract surgery  Bilateral    H/O hernia repair        SOCIAL HISTORY:  NA    FAMILY HISTORY:  NA      MEDICATIONS  (STANDING):  amLODIPine   Tablet 10 milliGRAM(s) Oral daily  apixaban 5 milliGRAM(s) Oral two times a day  ARIPiprazole 30 milliGRAM(s) Oral daily  aspirin enteric coated 81 milliGRAM(s) Oral daily  atorvastatin 10 milliGRAM(s) Oral at bedtime  budesonide 160 MICROgram(s)/formoterol 4.5 MICROgram(s) Inhaler 2 Puff(s) Inhalation two times a day  carvedilol 6.25 milliGRAM(s) Oral every 12 hours  cloNIDine 0.2 milliGRAM(s) Oral two times a day  cyanocobalamin 1000 MICROGram(s) Oral daily  dexAMETHasone     Tablet 6 milliGRAM(s) Oral daily  dextrose 40% Gel 15 Gram(s) Oral once  dextrose 5%. 1000 milliLiter(s) (50 mL/Hr) IV Continuous <Continuous>  dextrose 5%. 1000 milliLiter(s) (100 mL/Hr) IV Continuous <Continuous>  dextrose 50% Injectable 25 Gram(s) IV Push once  dextrose 50% Injectable 12.5 Gram(s) IV Push once  dextrose 50% Injectable 25 Gram(s) IV Push once  famotidine    Tablet 20 milliGRAM(s) Oral daily  folic acid 1 milliGRAM(s) Oral daily  furosemide   Injectable 40 milliGRAM(s) IV Push two times a day  glucagon  Injectable 1 milliGRAM(s) IntraMuscular once  hydrALAZINE 100 milliGRAM(s) Oral three times a day  insulin lispro (ADMELOG) corrective regimen sliding scale   SubCutaneous three times a day before meals  insulin lispro (ADMELOG) corrective regimen sliding scale   SubCutaneous at bedtime  isosorbide   mononitrate ER Tablet (IMDUR) 90 milliGRAM(s) Oral daily  lamoTRIgine 100 milliGRAM(s) Oral daily  mirtazapine 30 milliGRAM(s) Oral at bedtime  oxybutynin 5 milliGRAM(s) Oral two times a day  pantoprazole    Tablet 40 milliGRAM(s) Oral before breakfast  venlafaxine XR. 150 milliGRAM(s) Oral daily      PHYSICAL EXAMINATION:  T(F): 97.9 (22 @ 11:24)  HR: 74 (22 @ 11:24)  BP: 152/76 (22 @ 13:20)  RR: 16 (22 @ 11:24)  SpO2: 97% (22 @ 11:24)  Conversant, no apparent distress  PERRLA, pink conjunctivae, no ptosis  Good dentition, no pharyngeal erythema  Neck non tender, no mass, no thyromegaly or nodules  Increased work of breathing  Heart with RRR, no murmurs or rubs, +pitting peripheral edema  Abdomen soft, no masses, no organomegaly  Skin no rashes, ulcers or lesions, normal turgor and temperature  Appropriate affect, AO x 3    LABS:                        8.5    6.03  )-----------( 171      ( 2022 10:18 )             27.0     01-04    139  |  107  |  34<H>  ----------------------------<  181<H>  3.8   |  23  |  2.20<H>    Ca    8.3<L>      2022 10:18  Phos  3.5     -04  Mg     2.1     -    TPro  6.3  /  Alb  2.7<L>  /  TBili  0.4  /  DBili  x   /  AST  17  /  ALT  27  /  AlkPhos  59  01-04    Urinalysis Basic - ( 2022 11:33 )    Color: Pale Yellow / Appearance: Clear / S.020 / pH: x  Gluc: x / Ketone: Negative  / Bili: Negative / Urobili: Negative   Blood: x / Protein: 500 mg/dL / Nitrite: Negative   Leuk Esterase: Negative / RBC: 0-2 /HPF / WBC x   Sq Epi: x / Non Sq Epi: Occasional / Bacteria: Occasional        RADIOLOGY:  Chest X-Ray personally reviewed and shows bilateral perihilar patchy air space disease      ASSESSMENT:  1.  CKD stage 4 associated with DM and HTN  2.  Acute/chronic diastolic heart faillure  3.  COVID pneumonia  4.  Chronic fluid overload    PLAN:  No objection to diuresis as ordered  Monitor daily BMP and fluid balance

## 2022-01-04 NOTE — PROGRESS NOTE ADULT - SUBJECTIVE AND OBJECTIVE BOX
CC/F/U for:     HPI:  75 yo M PMH of A fib (on Eliquis) , MGUS s/p PRBC transfusion 10/21, anxiety/depression, CAD s/p stents (not on plavix), CHF, Diverticulosis, HLD, HTN, thyroid nodules, CKD stage 3, DM2 presents to the ED with SOB and LE edema. The patient lives in a group home and is unsure why the supervisor suggested he come to the ED. He reports difficulty breathing for a few months now, but it has been worsening over the last week. His shortness of breath is primarily on exertion. The patient also has wheezing and a dry cough. He states he has been sleeping in a sitting position, but does not understand why he had to come to the ED, states his LE swelling does not seem to be worse than his baseline. Also complaining of left sided chest pain which has been ongoing for a few days now, claims it to be 2/2 reflux. Denies radiation of the pain and diaphoresis. Denies fevers/chills, n/v/c/d, myalgias.     COVID-19 vaccinated with Moderna x2 in 2021, found to be COVID+    ED course:   Vitals:  BP:172/86    HR:88   Temp:97F   RR:16  O2: 100% on 10L/min NRB   Patient received: 324mg ASA x1, 1L LR x1  Labs: H/H 9.2/29.1, INR 1.47, Troponin I 114.7--> 117.8, K+ 3.3, BUN/Cr 28/2.30, Procal 0.20, BNP 4574  UA: occasional bacteria, small blood, 500 protein, 0-2 granular casts   EKG: Afib, Incomplete RBBB, Moderate voltage criteria for LVH, may be normal variant nonspecific T wave abnormality, Prolonged QT  CXR: There is a prominent cardiac silhouette and of the bronchovascular markings at the bilateral perihilar lower lung zones. Again noted is minimal blunting at the right costophrenic angle which may reflect trace pleural effusion and/or pleural thickening. (2022 14:17)        INTERVAL HPI/OVERNIGHT EVENTS:  Pt seen and examined at bedside.     Allergies/Intolerance: No Known Allergies      MEDICATIONS  (STANDING):  amLODIPine   Tablet 10 milliGRAM(s) Oral daily  apixaban 5 milliGRAM(s) Oral two times a day  ARIPiprazole 30 milliGRAM(s) Oral daily  aspirin enteric coated 81 milliGRAM(s) Oral daily  atorvastatin 10 milliGRAM(s) Oral at bedtime  budesonide 160 MICROgram(s)/formoterol 4.5 MICROgram(s) Inhaler 2 Puff(s) Inhalation two times a day  carvedilol 6.25 milliGRAM(s) Oral every 12 hours  cloNIDine 0.2 milliGRAM(s) Oral two times a day  cyanocobalamin 1000 MICROGram(s) Oral daily  dexAMETHasone     Tablet 6 milliGRAM(s) Oral daily  dextrose 40% Gel 15 Gram(s) Oral once  dextrose 5%. 1000 milliLiter(s) (100 mL/Hr) IV Continuous <Continuous>  dextrose 5%. 1000 milliLiter(s) (50 mL/Hr) IV Continuous <Continuous>  dextrose 50% Injectable 25 Gram(s) IV Push once  dextrose 50% Injectable 12.5 Gram(s) IV Push once  dextrose 50% Injectable 25 Gram(s) IV Push once  famotidine    Tablet 20 milliGRAM(s) Oral daily  folic acid 1 milliGRAM(s) Oral daily  furosemide   Injectable 40 milliGRAM(s) IV Push two times a day  glucagon  Injectable 1 milliGRAM(s) IntraMuscular once  hydrALAZINE 100 milliGRAM(s) Oral three times a day  insulin lispro (ADMELOG) corrective regimen sliding scale   SubCutaneous three times a day before meals  insulin lispro (ADMELOG) corrective regimen sliding scale   SubCutaneous at bedtime  isosorbide   mononitrate ER Tablet (IMDUR) 90 milliGRAM(s) Oral daily  lamoTRIgine 100 milliGRAM(s) Oral daily  mirtazapine 30 milliGRAM(s) Oral at bedtime  oxybutynin 5 milliGRAM(s) Oral two times a day  pantoprazole    Tablet 40 milliGRAM(s) Oral before breakfast  venlafaxine XR. 150 milliGRAM(s) Oral daily    MEDICATIONS  (PRN):  acetaminophen     Tablet .. 650 milliGRAM(s) Oral once PRN Temp greater or equal to 38C (100.4F)        ROS: as above; all other systems reviewed and wnl      PHYSICAL EXAMINATION:  Vital Signs Last 24 Hrs  T(C): 36.6 (2022 11:24), Max: 37.3 (2022 17:04)  T(F): 97.9 (2022 11:24), Max: 99.1 (2022 17:04)  HR: 74 (2022 11:24) (63 - 83)  BP: 148/94 (2022 11:24) (146/78 - 177/95)  BP(mean): --  RR: 16 (2022 11:24) (16 - 18)  SpO2: 97% (2022 11:24) (91% - 98%)  CAPILLARY BLOOD GLUCOSE      POCT Blood Glucose.: 139 mg/dL (2022 11:51)  POCT Blood Glucose.: 115 mg/dL (2022 08:04)  POCT Blood Glucose.: 120 mg/dL (2022 22:56)      GENERAL: NAD  HEENT: mucous membranes dry  RESP: respirations unlabored  HEART: HR s  ABDOMEN: soft, ND, NT   EXTREMITIES:  no edema b/l LEs, no calf tenderness  NEURO: awake, alert, interactive; moves all extremities      LABS:                        8.5    6.03  )-----------( 171      ( 2022 10:18 )             27.0     01-04    139  |  107  |  34<H>  ----------------------------<  181<H>  3.8   |  23  |  2.20<H>    Ca    8.3<L>      2022 10:18  Phos  3.5     01-04  Mg     2.1     01-04    TPro  6.3  /  Alb  2.7<L>  /  TBili  0.4  /  DBili  x   /  AST  17  /  ALT  27  /  AlkPhos  59  01-04    PT/INR - ( 2022 11:14 )   PT: 16.9 sec;   INR: 1.47 ratio         PTT - ( 2022 11:14 )  PTT:41.2 sec  Urinalysis Basic - ( 2022 11:33 )    Color: Pale Yellow / Appearance: Clear / S.020 / pH: x  Gluc: x / Ketone: Negative  / Bili: Negative / Urobili: Negative   Blood: x / Protein: 500 mg/dL / Nitrite: Negative   Leuk Esterase: Negative / RBC: 0-2 /HPF / WBC x   Sq Epi: x / Non Sq Epi: Occasional / Bacteria: Occasional          RADIOLOGY & ADDITIONAL TESTS:      ASSESSMENT AND PLAN:  76y Male CC/F/U for: covid, resp failure    HPI:  77 yo M PMH of A fib (on Eliquis) , MGUS s/p PRBC transfusion 10/21, anxiety/depression, CAD s/p stents (not on plavix), CHF, Diverticulosis, HLD, HTN, thyroid nodules, CKD stage 3, DM2 presents to the ED with SOB and LE edema. The patient lives in a group home and is unsure why the supervisor suggested he come to the ED. He reports difficulty breathing for a few months now, but it has been worsening over the last week. His shortness of breath is primarily on exertion. The patient also has wheezing and a dry cough. He states he has been sleeping in a sitting position, but does not understand why he had to come to the ED, states his LE swelling does not seem to be worse than his baseline. Also complaining of left sided chest pain which has been ongoing for a few days now, claims it to be 2/2 reflux. Denies radiation of the pain and diaphoresis. Denies fevers/chills, n/v/c/d, myalgias.     COVID-19 vaccinated with Moderna x2 in 2021, found to be COVID+    ED course:   Vitals:  BP:172/86    HR:88   Temp:97F   RR:16  O2: 100% on 10L/min NRB   Patient received: 324mg ASA x1, 1L LR x1  Labs: H/H 9.2/29.1, INR 1.47, Troponin I 114.7--> 117.8, K+ 3.3, BUN/Cr 28/2.30, Procal 0.20, BNP 4574  UA: occasional bacteria, small blood, 500 protein, 0-2 granular casts   EKG: Afib, Incomplete RBBB, Moderate voltage criteria for LVH, may be normal variant nonspecific T wave abnormality, Prolonged QT  CXR: There is a prominent cardiac silhouette and of the bronchovascular markings at the bilateral perihilar lower lung zones. Again noted is minimal blunting at the right costophrenic angle which may reflect trace pleural effusion and/or pleural thickening. (2022 14:17)        INTERVAL HPI/OVERNIGHT EVENTS:  Pt seen and examined at bedside - initially on RA ->now requiring 3L NC.     Allergies/Intolerance: No Known Allergies      MEDICATIONS  (STANDING):  amLODIPine   Tablet 10 milliGRAM(s) Oral daily  apixaban 5 milliGRAM(s) Oral two times a day  ARIPiprazole 30 milliGRAM(s) Oral daily  aspirin enteric coated 81 milliGRAM(s) Oral daily  atorvastatin 10 milliGRAM(s) Oral at bedtime  budesonide 160 MICROgram(s)/formoterol 4.5 MICROgram(s) Inhaler 2 Puff(s) Inhalation two times a day  carvedilol 6.25 milliGRAM(s) Oral every 12 hours  cloNIDine 0.2 milliGRAM(s) Oral two times a day  cyanocobalamin 1000 MICROGram(s) Oral daily  dexAMETHasone     Tablet 6 milliGRAM(s) Oral daily  dextrose 40% Gel 15 Gram(s) Oral once  dextrose 5%. 1000 milliLiter(s) (100 mL/Hr) IV Continuous <Continuous>  dextrose 5%. 1000 milliLiter(s) (50 mL/Hr) IV Continuous <Continuous>  dextrose 50% Injectable 25 Gram(s) IV Push once  dextrose 50% Injectable 12.5 Gram(s) IV Push once  dextrose 50% Injectable 25 Gram(s) IV Push once  famotidine    Tablet 20 milliGRAM(s) Oral daily  folic acid 1 milliGRAM(s) Oral daily  furosemide   Injectable 40 milliGRAM(s) IV Push two times a day  glucagon  Injectable 1 milliGRAM(s) IntraMuscular once  hydrALAZINE 100 milliGRAM(s) Oral three times a day  insulin lispro (ADMELOG) corrective regimen sliding scale   SubCutaneous three times a day before meals  insulin lispro (ADMELOG) corrective regimen sliding scale   SubCutaneous at bedtime  isosorbide   mononitrate ER Tablet (IMDUR) 90 milliGRAM(s) Oral daily  lamoTRIgine 100 milliGRAM(s) Oral daily  mirtazapine 30 milliGRAM(s) Oral at bedtime  oxybutynin 5 milliGRAM(s) Oral two times a day  pantoprazole    Tablet 40 milliGRAM(s) Oral before breakfast  venlafaxine XR. 150 milliGRAM(s) Oral daily    MEDICATIONS  (PRN):  acetaminophen     Tablet .. 650 milliGRAM(s) Oral once PRN Temp greater or equal to 38C (100.4F)        ROS: as above; all other systems reviewed and wnl      PHYSICAL EXAMINATION:  Vital Signs Last 24 Hrs  T(C): 36.6 (2022 11:24), Max: 37.3 (2022 17:04)  T(F): 97.9 (2022 11:24), Max: 99.1 (2022 17:04)  HR: 74 (2022 11:24) (63 - 83)  BP: 148/94 (2022 11:24) (146/78 - 177/95)  BP(mean): --  RR: 16 (2022 11:24) (16 - 18)  SpO2: 97% (2022 11:24) (91% - 98%)  CAPILLARY BLOOD GLUCOSE      POCT Blood Glucose.: 139 mg/dL (2022 11:51)  POCT Blood Glucose.: 115 mg/dL (2022 08:04)  POCT Blood Glucose.: 120 mg/dL (2022 22:56)      GENERAL: obese, elderly male, NAD  HEENT: mucous membranes dry  RESP: respirations unlabored  HEART: HR 70s  ABDOMEN: obese, soft, ND, NT   EXTREMITIES:  no edema b/l LEs, no calf tenderness  NEURO: awake, alert, interactive; moves all extremities      LABS:                        8.5    6.03  )-----------( 171      ( 2022 10:18 )             27.0     01-04    139  |  107  |  34<H>  ----------------------------<  181<H>  3.8   |  23  |  2.20<H>    Ca    8.3<L>      2022 10:18  Phos  3.5     01-04  Mg     2.1     01-04    TPro  6.3  /  Alb  2.7<L>  /  TBili  0.4  /  DBili  x   /  AST  17  /  ALT  27  /  AlkPhos  59  01-04    PT/INR - ( 2022 11:14 )   PT: 16.9 sec;   INR: 1.47 ratio         PTT - ( 2022 11:14 )  PTT:41.2 sec  Urinalysis Basic - ( 2022 11:33 )    Color: Pale Yellow / Appearance: Clear / S.020 / pH: x  Gluc: x / Ketone: Negative  / Bili: Negative / Urobili: Negative   Blood: x / Protein: 500 mg/dL / Nitrite: Negative   Leuk Esterase: Negative / RBC: 0-2 /HPF / WBC x   Sq Epi: x / Non Sq Epi: Occasional / Bacteria: Occasional         CC/F/U for: covid, resp failure, vol o/l    HPI:  77 yo M PMH of A fib (on Eliquis) , MGUS s/p PRBC transfusion 10/21, anxiety/depression, CAD s/p stents (not on plavix), CHF, Diverticulosis, HLD, HTN, thyroid nodules, CKD stage 3, DM2 presents to the ED with SOB and LE edema. The patient lives in a group home and is unsure why the supervisor suggested he come to the ED. He reports difficulty breathing for a few months now, but it has been worsening over the last week. His shortness of breath is primarily on exertion. The patient also has wheezing and a dry cough. He states he has been sleeping in a sitting position, but does not understand why he had to come to the ED, states his LE swelling does not seem to be worse than his baseline. Also complaining of left sided chest pain which has been ongoing for a few days now, claims it to be 2/2 reflux. Denies radiation of the pain and diaphoresis. Denies fevers/chills, n/v/c/d, myalgias.     COVID-19 vaccinated with Moderna x2 in 2021, found to be COVID+    ED course:   Vitals:  BP:172/86    HR:88   Temp:97F   RR:16  O2: 100% on 10L/min NRB   Patient received: 324mg ASA x1, 1L LR x1  Labs: H/H 9.2/29.1, INR 1.47, Troponin I 114.7--> 117.8, K+ 3.3, BUN/Cr 28/2.30, Procal 0.20, BNP 4574  UA: occasional bacteria, small blood, 500 protein, 0-2 granular casts   EKG: Afib, Incomplete RBBB, Moderate voltage criteria for LVH, may be normal variant nonspecific T wave abnormality, Prolonged QT  CXR: There is a prominent cardiac silhouette and of the bronchovascular markings at the bilateral perihilar lower lung zones. Again noted is minimal blunting at the right costophrenic angle which may reflect trace pleural effusion and/or pleural thickening. (2022 14:17)        INTERVAL HPI/OVERNIGHT EVENTS:  Pt seen and examined at bedside - initially on RA ->now requiring 3L NC.     Allergies/Intolerance: No Known Allergies      MEDICATIONS  (STANDING):  amLODIPine   Tablet 10 milliGRAM(s) Oral daily  apixaban 5 milliGRAM(s) Oral two times a day  ARIPiprazole 30 milliGRAM(s) Oral daily  aspirin enteric coated 81 milliGRAM(s) Oral daily  atorvastatin 10 milliGRAM(s) Oral at bedtime  budesonide 160 MICROgram(s)/formoterol 4.5 MICROgram(s) Inhaler 2 Puff(s) Inhalation two times a day  carvedilol 6.25 milliGRAM(s) Oral every 12 hours  cloNIDine 0.2 milliGRAM(s) Oral two times a day  cyanocobalamin 1000 MICROGram(s) Oral daily  dexAMETHasone     Tablet 6 milliGRAM(s) Oral daily  dextrose 40% Gel 15 Gram(s) Oral once  dextrose 5%. 1000 milliLiter(s) (100 mL/Hr) IV Continuous <Continuous>  dextrose 5%. 1000 milliLiter(s) (50 mL/Hr) IV Continuous <Continuous>  dextrose 50% Injectable 25 Gram(s) IV Push once  dextrose 50% Injectable 12.5 Gram(s) IV Push once  dextrose 50% Injectable 25 Gram(s) IV Push once  famotidine    Tablet 20 milliGRAM(s) Oral daily  folic acid 1 milliGRAM(s) Oral daily  furosemide   Injectable 40 milliGRAM(s) IV Push two times a day  glucagon  Injectable 1 milliGRAM(s) IntraMuscular once  hydrALAZINE 100 milliGRAM(s) Oral three times a day  insulin lispro (ADMELOG) corrective regimen sliding scale   SubCutaneous three times a day before meals  insulin lispro (ADMELOG) corrective regimen sliding scale   SubCutaneous at bedtime  isosorbide   mononitrate ER Tablet (IMDUR) 90 milliGRAM(s) Oral daily  lamoTRIgine 100 milliGRAM(s) Oral daily  mirtazapine 30 milliGRAM(s) Oral at bedtime  oxybutynin 5 milliGRAM(s) Oral two times a day  pantoprazole    Tablet 40 milliGRAM(s) Oral before breakfast  venlafaxine XR. 150 milliGRAM(s) Oral daily    MEDICATIONS  (PRN):  acetaminophen     Tablet .. 650 milliGRAM(s) Oral once PRN Temp greater or equal to 38C (100.4F)        ROS: as above; all other systems reviewed and wnl      PHYSICAL EXAMINATION:  Vital Signs Last 24 Hrs  T(C): 36.6 (2022 11:24), Max: 37.3 (2022 17:04)  T(F): 97.9 (2022 11:24), Max: 99.1 (2022 17:04)  HR: 74 (2022 11:24) (63 - 83)  BP: 148/94 (2022 11:24) (146/78 - 177/95)  BP(mean): --  RR: 16 (2022 11:24) (16 - 18)  SpO2: 97% (2022 11:24) (91% - 98%)  CAPILLARY BLOOD GLUCOSE      POCT Blood Glucose.: 139 mg/dL (2022 11:51)  POCT Blood Glucose.: 115 mg/dL (2022 08:04)  POCT Blood Glucose.: 120 mg/dL (2022 22:56)      GENERAL: obese, elderly male, NAD  HEENT: mucous membranes dry  RESP: respirations unlabored  HEART: HR 70s  ABDOMEN: obese, soft, ND, NT   EXTREMITIES:  no edema b/l LEs, no calf tenderness  NEURO: awake, alert, interactive; moves all extremities      LABS:                        8.5    6.03  )-----------( 171      ( 2022 10:18 )             27.0     01-04    139  |  107  |  34<H>  ----------------------------<  181<H>  3.8   |  23  |  2.20<H>    Ca    8.3<L>      2022 10:18  Phos  3.5     01-04  Mg     2.1     01-04    TPro  6.3  /  Alb  2.7<L>  /  TBili  0.4  /  DBili  x   /  AST  17  /  ALT  27  /  AlkPhos  59  01-04    PT/INR - ( 2022 11:14 )   PT: 16.9 sec;   INR: 1.47 ratio         PTT - ( 2022 11:14 )  PTT:41.2 sec  Urinalysis Basic - ( 2022 11:33 )    Color: Pale Yellow / Appearance: Clear / S.020 / pH: x  Gluc: x / Ketone: Negative  / Bili: Negative / Urobili: Negative   Blood: x / Protein: 500 mg/dL / Nitrite: Negative   Leuk Esterase: Negative / RBC: 0-2 /HPF / WBC x   Sq Epi: x / Non Sq Epi: Occasional / Bacteria: Occasional

## 2022-01-04 NOTE — PROGRESS NOTE ADULT - SUBJECTIVE AND OBJECTIVE BOX
Date/Time Patient Seen:  		  Referring MD:   Data Reviewed	       Patient is a 76y old  Male who presents with a chief complaint of Dyspnea (03 Jan 2022 16:43)      Subjective/HPI     PAST MEDICAL & SURGICAL HISTORY:  Hypertension    Diabetes mellitus    Lupus    Hepatitis C    Anxiety and depression    CAD (coronary artery disease)  s/p stents    Diverticulosis    Hyperlipidemia    HTN (hypertension)  c/b multiple episodes of hypertensive urgency    HLD (hyperlipidemia)    Atrial fibrillation  first documented on EKG 10/7/2021    CAD (coronary artery disease)  s/p stents (not on plavix)    Type 2 diabetes mellitus  not on home insulin/ Meds    Anxiety  Depression  multiple psych medications    History of diverticulitis  07/2021    Diverticulosis  c/b GIB in 2020    Afib  on AC    Stage 3 chronic kidney disease    Anemia of chronic disease  Monoclonal Gammopathy-MGUS  pRBC transfusion 10/15/21    Chronic diastolic congestive heart failure    Multiple thyroid nodules    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  Had surgery ( April 2013 )    S/P tonsillectomy    S/P arthroscopic knee surgery  Bilateral ( 2005 )    Torsion of testicle  Had surgery at age 14    Pilonidal cyst  Had surgery ( 1969 )    S/P cataract surgery  Bilateral    H/O hernia repair          Medication list         MEDICATIONS  (STANDING):  amLODIPine   Tablet 10 milliGRAM(s) Oral daily  apixaban 5 milliGRAM(s) Oral two times a day  ARIPiprazole 30 milliGRAM(s) Oral daily  aspirin enteric coated 81 milliGRAM(s) Oral daily  atorvastatin 10 milliGRAM(s) Oral at bedtime  budesonide 160 MICROgram(s)/formoterol 4.5 MICROgram(s) Inhaler 2 Puff(s) Inhalation two times a day  carvedilol 6.25 milliGRAM(s) Oral every 12 hours  cloNIDine 0.2 milliGRAM(s) Oral two times a day  cyanocobalamin 1000 MICROGram(s) Oral daily  dextrose 40% Gel 15 Gram(s) Oral once  dextrose 5%. 1000 milliLiter(s) (50 mL/Hr) IV Continuous <Continuous>  dextrose 5%. 1000 milliLiter(s) (100 mL/Hr) IV Continuous <Continuous>  dextrose 50% Injectable 25 Gram(s) IV Push once  dextrose 50% Injectable 12.5 Gram(s) IV Push once  dextrose 50% Injectable 25 Gram(s) IV Push once  famotidine    Tablet 20 milliGRAM(s) Oral daily  folic acid 1 milliGRAM(s) Oral daily  furosemide   Injectable 40 milliGRAM(s) IV Push two times a day  glucagon  Injectable 1 milliGRAM(s) IntraMuscular once  hydrALAZINE 100 milliGRAM(s) Oral three times a day  insulin lispro (ADMELOG) corrective regimen sliding scale   SubCutaneous three times a day before meals  insulin lispro (ADMELOG) corrective regimen sliding scale   SubCutaneous at bedtime  isosorbide   mononitrate ER Tablet (IMDUR) 90 milliGRAM(s) Oral daily  lamoTRIgine 100 milliGRAM(s) Oral daily  mirtazapine 30 milliGRAM(s) Oral at bedtime  oxybutynin 5 milliGRAM(s) Oral two times a day  pantoprazole    Tablet 40 milliGRAM(s) Oral before breakfast  venlafaxine XR. 150 milliGRAM(s) Oral daily    MEDICATIONS  (PRN):  acetaminophen     Tablet .. 650 milliGRAM(s) Oral once PRN Temp greater or equal to 38C (100.4F)         Vitals log        ICU Vital Signs Last 24 Hrs  T(C): 37.2 (04 Jan 2022 05:15), Max: 37.3 (03 Jan 2022 17:04)  T(F): 99 (04 Jan 2022 05:15), Max: 99.1 (03 Jan 2022 17:04)  HR: 83 (04 Jan 2022 05:15) (63 - 88)  BP: 177/86 (04 Jan 2022 05:15) (146/78 - 177/95)  BP(mean): --  ABP: --  ABP(mean): --  RR: 18 (04 Jan 2022 05:15) (16 - 19)  SpO2: 91% (04 Jan 2022 05:15) (91% - 100%)           Input and Output:  I&O's Detail      Lab Data                        9.2    7.59  )-----------( 201      ( 03 Jan 2022 11:14 )             29.1     01-03    140  |  110<H>  |  28<H>  ----------------------------<  136<H>  3.3<L>   |  22  |  2.30<H>    Ca    8.2<L>      03 Jan 2022 11:14    TPro  6.5  /  Alb  3.0<L>  /  TBili  0.4  /  DBili  x   /  AST  18  /  ALT  28  /  AlkPhos  65  01-03            Review of Systems	      Objective     Physical Examination  heart s1s2  lung dc BS  abd soft  head nc        Pertinent Lab findings & Imaging      El:  NO   Adequate UO     I&O's Detail           Discussed with:     Cultures:	        Radiology

## 2022-01-04 NOTE — PROGRESS NOTE ADULT - ASSESSMENT
76M,group home, DM2, HTN, HL, CAD/stents, Afib/Eliquis,stage 3 CKD, MGUS; mgmt for covid19 PNA, acute hypoxic resp failure    COVID19 PNA - assoc hypoxic resp failure  -d/w ID re remdesivir keeping in mind renal dysfunction  -continue IV dexamethasone - plan for 10d course  -continue suppl O2 support - on 3L currently - wean as tolerated  -airborne/contact precautions  -trending inflammatory markers  -Pulm/ID following    CKD - Cr appears to be at baseline in 2s range - renally dose meds, no nephrotoxics    HTN, HL, CAD, Afib - continue CV regimen of asa, lipitor, coreg, clonidine, norvasc, eliquis    DM - glycemic cvrg w/insulin    psych - continue abilify per home meds    dvt prophy - therap a/c     76M,group home, DM2, HTN, HL, CAD/stents, Afib/Eliquis,stage 3 CKD, MGUS; mgmt for covid19 PNA, acute hypoxic resp failure, vol o/l    COVID19 PNA - assoc hypoxic resp failure  -d/w ID re remdesivir keeping in mind renal dysfunction  -continue IV dexamethasone - plan for 10d course  -continue suppl O2 support - on 3L currently - wean as tolerated  -airborne/contact precautions  -trending inflammatory markers  -Pulm/ID following    volume overload - on IV lasix bid; strict I/Os; monitoring hemodynamics/renal function w/diuresis    CKD - Cr appears to be at baseline in 2s range - renally dose meds, no nephrotoxics    HTN, HL, CAD, Afib - continue CV regimen of asa, lipitor, coreg, clonidine, norvasc, eliquis    DM - glycemic cvrg w/insulin    psych - continue abilify per home meds    dvt prophy - therap a/c

## 2022-01-04 NOTE — PROGRESS NOTE ADULT - ASSESSMENT
75 yo M PMH of A fib (on Eliquis) , MGUS s/p PRBC transfusion 10/21, anxiety/depression, CAD s/p stents (not on plavix), CHF, Diverticulosis, HLD, HTN, thyroid nodules, CKD stage 3, DM presents to the ED with SOB    on o2 support  vs noted  will add Decadron  on lasix    Previous echo 10/21 showed: Normal LV size with normal LV systolic function with estimated LVEF 55-60%. LV diastolic function cannot determine due to atrial fibrillation  Obesity - exam and risk factors - and features - c/w ELMER - outpatient eval  AF - on AC -   D dimer noted - serial D dimer  cvs rx regimen - diuresis - cxr and proBNP c/w Vol Overload - Cardio eval in progress - old records and TTE reviewed - I and O, serial CE -   dietary discretion  Covid - in vaccinated pt - monitor VS and Sat - Acap and Robitussin PRN - monitor VS and HD and Sat - Decadron is indicated in covid with hypoxemia  pt is on Home Rx regimen of Symbicort - unclear indication - ex smoker - no history of COPD as per pt or medical record - prior CT without Emphysema  isolation for covid -

## 2022-01-04 NOTE — PROGRESS NOTE ADULT - ASSESSMENT
75 yo M with PMH of Type 2 DM (not on insulin), BPH, CAD s/p stents >4 years ago (not on Plavix), diverticulitis (hospitalized 07/2020 at [facility]), GIB 2/2 diverticulosis (2020), anxiety, Lupus, HTN, HLD, CKD stage 3, HepC, hx of blood clots in brain (s/p surgery 2013) [facility] with shortness of breath and leg swelling. Found to be covid positive. Cardio consulted for elevated troponins and CHF.    Volume overloaded, CAD, HTN, HLD  -hypervolemic on exam , BNP 4574  -continue iv lasix BID  -strict intake and output   -supplemental oxygen   - Previous echo 10/21 showed: Normal LV size with normal LV systolic function with estimated LVEF 55-60%. LV diastolic function cannot determine due to atrial fibrillation. Mild mitral regurgitation is present. Mild aortic stenosis is  present. Mild tricuspid regurgitation is present . No evidence of any pulmonary hypertension  - trend troponin to peak, ,monitor on tele overnight afib 70 no events   likely demand ischemia in setting of above, plan for outpatient stress when underlying covid resolves   - continue home eliquis for afib.  -bp stable 148/94- continue coreg, norvasc, hydralazine , isosorbide   -COVID management as per primary   - monitor and replete lytes, keep K>4, Mg>2  Carmelina Leger FNP-C  Cardiology NP  [id number] [phone]

## 2022-01-04 NOTE — PATIENT PROFILE ADULT - NSPROHMDIABETBLDGLCTARGETFT_GEN_A_NUR
Following admission, the patient noted significant drainage from the area. There has been purulent drainage with blood.  He continues with pain.   125

## 2022-01-04 NOTE — PROGRESS NOTE ADULT - ASSESSMENT
75 yo M PMH of A fib, MGUS s/p PRBC transfusion 10/21, anxiety/depression, CAD s/p stents (not on plavix), CHF, Diverticulosis, HLD, HTN, thyroid nodules, CKD stage 3, DM, who presented with SOB and bilateral LE edema.     Symptoms probably related to being volume overloaded, but patient also COVID positive. He has no fevers but now on O2 via nasal cannula. Patient with CKD (GFR 28) but the benefits of remdesivir appear to outweigh the risks. Would recommend starting and observing labs closely.    -recommend remdesivir, monitor renal function and LFT's  -continue steroids to complete 10 days  -AC per institution protocol    Cheyenne Ochoa MD  Division of Infectious Diseases   Cell [phone] between 8am and 6pm   After 6pm and weekends please call ID service at [phone].    pt comes to ED for fussiness and crying at home all night. mother states changed child formula in the last few days, stools have been more solid thinks the baby is constipated. child is sleeping on arrival. up to date on vaccinations. auscultated hr correlates with v/s machine

## 2022-01-04 NOTE — PROGRESS NOTE ADULT - SUBJECTIVE AND OBJECTIVE BOX
[facility] -- Fifi Bliss, Kalpesh Valdovinos, Abraham Sheridan Savella  Office # [phone]      Follow Up:    hld afib   Subjective/Observations:   Seen at bedside, generalized weakness , cough, myalgias  denies chest pain palpitations dizziness     REVIEW OF SYSTEMS: All other review of systems is negative unless indicated above    PAST MEDICAL & SURGICAL HISTORY:  HTN (hypertension)  c/b multiple episodes of hypertensive urgency    HLD (hyperlipidemia)    Atrial fibrillation  first documented on EKG 10/7/2021    CAD (coronary artery disease)  s/p stents (not on plavix)    Type 2 diabetes mellitus  not on home insulin/ Meds    Anxiety  Depression  multiple psych medications    History of diverticulitis  07/2021    Diverticulosis  c/b GIB in 2020    Afib  on AC    Stage 3 chronic kidney disease    Anemia of chronic disease  Monoclonal Gammopathy-MGUS  pRBC transfusion 10/15/21    Chronic diastolic congestive heart failure    Multiple thyroid nodules    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  Had surgery ( April 2013 )    S/P tonsillectomy    S/P arthroscopic knee surgery  Bilateral ( 2005 )    Torsion of testicle  Had surgery at age 14    Pilonidal cyst  Had surgery ( 1969 )    S/P cataract surgery  Bilateral    H/O hernia repair        MEDICATIONS  (STANDING):  amLODIPine   Tablet 10 milliGRAM(s) Oral daily  apixaban 5 milliGRAM(s) Oral two times a day  ARIPiprazole 30 milliGRAM(s) Oral daily  aspirin enteric coated 81 milliGRAM(s) Oral daily  atorvastatin 10 milliGRAM(s) Oral at bedtime  budesonide 160 MICROgram(s)/formoterol 4.5 MICROgram(s) Inhaler 2 Puff(s) Inhalation two times a day  carvedilol 6.25 milliGRAM(s) Oral every 12 hours  cloNIDine 0.2 milliGRAM(s) Oral two times a day  cyanocobalamin 1000 MICROGram(s) Oral daily  dexAMETHasone     Tablet 6 milliGRAM(s) Oral daily  dextrose 40% Gel 15 Gram(s) Oral once  dextrose 5%. 1000 milliLiter(s) (100 mL/Hr) IV Continuous <Continuous>  dextrose 5%. 1000 milliLiter(s) (50 mL/Hr) IV Continuous <Continuous>  dextrose 50% Injectable 25 Gram(s) IV Push once  dextrose 50% Injectable 12.5 Gram(s) IV Push once  dextrose 50% Injectable 25 Gram(s) IV Push once  famotidine    Tablet 20 milliGRAM(s) Oral daily  folic acid 1 milliGRAM(s) Oral daily  furosemide   Injectable 40 milliGRAM(s) IV Push two times a day  glucagon  Injectable 1 milliGRAM(s) IntraMuscular once  hydrALAZINE 100 milliGRAM(s) Oral three times a day  insulin lispro (ADMELOG) corrective regimen sliding scale   SubCutaneous three times a day before meals  insulin lispro (ADMELOG) corrective regimen sliding scale   SubCutaneous at bedtime  isosorbide   mononitrate ER Tablet (IMDUR) 90 milliGRAM(s) Oral daily  lamoTRIgine 100 milliGRAM(s) Oral daily  mirtazapine 30 milliGRAM(s) Oral at bedtime  oxybutynin 5 milliGRAM(s) Oral two times a day  pantoprazole    Tablet 40 milliGRAM(s) Oral before breakfast  venlafaxine XR. 150 milliGRAM(s) Oral daily    MEDICATIONS  (PRN):  acetaminophen     Tablet .. 650 milliGRAM(s) Oral once PRN Temp greater or equal to 38C (100.4F)      Allergies    No Known Allergies    Intolerances        Vital Signs Last 24 Hrs  T(C): 36.6 (04 Jan 2022 11:24), Max: 37.3 (03 Jan 2022 17:04)  T(F): 97.9 (04 Jan 2022 11:24), Max: 99.1 (03 Jan 2022 17:04)  HR: 74 (04 Jan 2022 11:24) (63 - 83)  BP: 148/94 (04 Jan 2022 11:24) (146/78 - 177/95)  BP(mean): --  RR: 16 (04 Jan 2022 11:24) (16 - 18)  SpO2: 97% (04 Jan 2022 11:24) (91% - 98%)    I&O's Summary        PHYSICAL EXAM:  TELE: Af  Constitutional: NAD, awake and alert, obese   HEENT: Moist Mucous Membranes, Anicteric  Pulmonary: Non-labored, breath sounds diminished   Cardiovascular: IRRegular, S1 and S2 nl, No murmurs, rubs, gallops or clicks  Gastrointestinal: Bowel Sounds present, soft, nontender.   Lymph+ pitting peripheral edema. bilateral LE  Skin: No visible rashes or ulcers.  Psych:  Mood & affect appropriate    LABS: All Labs Reviewed:                        8.5    6.03  )-----------( 171      ( 04 Jan 2022 10:18 )             27.0                         9.2    7.59  )-----------( 201      ( 03 Jan 2022 11:14 )             29.1     04 Jan 2022 10:18    139    |  107    |  34     ----------------------------<  181    3.8     |  23     |  2.20   03 Jan 2022 11:14    140    |  110    |  28     ----------------------------<  136    3.3     |  22     |  2.30     Ca    8.3        04 Jan 2022 10:18  Ca    8.2        03 Jan 2022 11:14  Phos  3.5       04 Jan 2022 10:18  Mg     2.1       04 Jan 2022 10:18    TPro  6.3    /  Alb  2.7    /  TBili  0.4    /  DBili  x      /  AST  17     /  ALT  27     /  AlkPhos  59     04 Jan 2022 10:18  TPro  6.5    /  Alb  3.0    /  TBili  0.4    /  DBili  x      /  AST  18     /  ALT  28     /  AlkPhos  65     03 Jan 2022 11:14    PT/INR - ( 03 Jan 2022 11:14 )   PT: 16.9 sec;   INR: 1.47 ratio         PTT - ( 03 Jan 2022 11:14 )  PTT:41.2 sec         < from: TTE Echo Complete w/o Contrast w/ Doppler (10.13.21 @ 09:51) >      INTERPRETATION:  Ordering Physician: SKYLA TERRY [id number]  Indication: Cardiac arrhythmias.  Technician: PH  Study Quality: Technical difficult study.  A complete echocardiographic study was performed utilizing standard protocol including spectral and color Doppler in all echocardiographic windows.  Height: 172cm  Weight: 102kg  BSA: 2.1m2  Blood Pressure: 150/90  MEASUREMENTS  IVS: 1.3cm  PWT: 1.3cm  LA: 4.2cm  AO: 3.5cm  LVIDd: 5.4 cm.  LVIDs: 3.5cm  LVEF: 55-60%.  PASP: 34mm Hg  FINDINGS  Left Ventricle: Normal in  size and normal LV systolic function with estimated LVEF 55-60%.  Right Ventricle: Normal in size and normal RV systolic function.  Left Atrium: Mild dilated.  Right Atrium: Normal in size.  Mitral Valve: Mild mitral annular calcification is present. Mitral valve is mildly calcified and no evidence of any mitral stenosis. Mild mitral regurgitation is present.  Aortic Valve: Aortic root is mildsclerotic and of normal dimension. Aortic valve is mildly calcified and mild  aortic stenosis is present with peak gradient across aortic valve is 30 mmHg. Aortic valve area not calculated.  Tricuspid Valve: Mild tricuspid regurgitation with calculated PA systolic pressure 34 mmHg is present. No evidence of any pulmonary hypertension  Pulmonic Valve: Trace Pulmonary regurgitation is present.  Diastolic Function: LV diastolic function cannot determine due to  underlying atrial fibrillation.  Pericardium/Pleura: No evidence of any pericardial effusion.  No intracardiac mass  thrombus or vegetations and  tumor.  Mild concentric left ventricular hypertrophy is present.  IMPRESSION:  Normal LV size with normal LV systolic function with estimated LVEF 55-60%.    LV diastolic function cannot determine due to atrial fibrillation.    Mild mitral regurgitation is present.    Mild aortic stenosis is  present.    Mild tricuspid regurgitation is present . No evidence of any pulmonary hypertension    Correlate clinically above findings.    < end of copied text >

## 2022-01-04 NOTE — PROGRESS NOTE ADULT - SUBJECTIVE AND OBJECTIVE BOX
[facility]  INFECTIOUS DISEASES - [address], [place] [address]  Tel: [phone]     Fax: [phone]  =======================================================    ZARATETRIPP [id number]    Follow up: No fevers. Patient seen on 3L O2 via nasal cannula. Breathing feels about the same.    Allergies:  No Known Allergies      Antibiotics:  acetaminophen     Tablet .. 650 milliGRAM(s) Oral every 6 hours PRN  amLODIPine   Tablet 10 milliGRAM(s) Oral daily  apixaban 5 milliGRAM(s) Oral two times a day  ARIPiprazole 30 milliGRAM(s) Oral daily  aspirin enteric coated 81 milliGRAM(s) Oral daily  atorvastatin 10 milliGRAM(s) Oral at bedtime  budesonide 160 MICROgram(s)/formoterol 4.5 MICROgram(s) Inhaler 2 Puff(s) Inhalation two times a day  carvedilol 6.25 milliGRAM(s) Oral every 12 hours  cloNIDine 0.2 milliGRAM(s) Oral two times a day  cyanocobalamin 1000 MICROGram(s) Oral daily  dexAMETHasone     Tablet 6 milliGRAM(s) Oral daily  dextrose 40% Gel 15 Gram(s) Oral once  dextrose 5%. 1000 milliLiter(s) IV Continuous <Continuous>  dextrose 5%. 1000 milliLiter(s) IV Continuous <Continuous>  dextrose 50% Injectable 25 Gram(s) IV Push once  dextrose 50% Injectable 12.5 Gram(s) IV Push once  dextrose 50% Injectable 25 Gram(s) IV Push once  famotidine    Tablet 20 milliGRAM(s) Oral daily  folic acid 1 milliGRAM(s) Oral daily  furosemide   Injectable 40 milliGRAM(s) IV Push two times a day  glucagon  Injectable 1 milliGRAM(s) IntraMuscular once  hydrALAZINE 100 milliGRAM(s) Oral three times a day  insulin lispro (ADMELOG) corrective regimen sliding scale   SubCutaneous three times a day before meals  insulin lispro (ADMELOG) corrective regimen sliding scale   SubCutaneous at bedtime  isosorbide   mononitrate ER Tablet (IMDUR) 90 milliGRAM(s) Oral daily  lamoTRIgine 100 milliGRAM(s) Oral daily  mirtazapine 30 milliGRAM(s) Oral at bedtime  oxybutynin 5 milliGRAM(s) Oral two times a day  pantoprazole    Tablet 40 milliGRAM(s) Oral before breakfast  remdesivir  IVPB   IV Intermittent   venlafaxine XR. 150 milliGRAM(s) Oral daily       REVIEW OF SYSTEMS:  CONSTITUTIONAL:  No Fevers  HEENT:  No sore throat or runny nose.  CARDIOVASCULAR:  No chest pain   RESPIRATORY:  see history  GASTROINTESTINAL:  No abdominal pain.  GENITOURINARY:  No dysuria  NEUROLOGIC:  No dizziness     Physical Exam:  ICU Vital Signs Last 24 Hrs  T(C): 36.6 (2022 11:24), Max: 37.2 (2022 05:15)  T(F): 97.9 (2022 11:24), Max: 99 (2022 05:15)  HR: 64 (2022 16:59) (63 - 83)  BP: 149/77 (2022 16:59) (146/78 - 177/86)  BP(mean): --  ABP: --  ABP(mean): --  RR: 16 (2022 11:24) (16 - 18)  SpO2: 97% (2022 11:24) (91% - 98%)     GEN: Sleepy but arousable, NAD  HEENT: normocephalic and atraumatic.  NECK: Supple.   LUNGS: Clear to auscultation.  HEART: Regular rate and rhythm  ABDOMEN: Soft, nontender  EXTREMITIES: bipedal edema, no erythema    Labs:      139  |  107  |  34<H>  ----------------------------<  181<H>  3.8   |  23  |  2.20<H>    Ca    8.3<L>      2022 10:18  Phos  3.5       Mg     2.1         TPro  6.3  /  Alb  2.7<L>  /  TBili  0.4  /  DBili  x   /  AST  17  /  ALT  27  /  AlkPhos  59                            8.5    6.03  )-----------( 171      ( 2022 10:18 )             27.0     PT/INR - ( 2022 11:14 )   PT: 16.9 sec;   INR: 1.47 ratio         PTT - ( 2022 11:14 )  PTT:41.2 sec  Urinalysis Basic - ( 2022 11:33 )    Color: Pale Yellow / Appearance: Clear / S.020 / pH: x  Gluc: x / Ketone: Negative  / Bili: Negative / Urobili: Negative   Blood: x / Protein: 500 mg/dL / Nitrite: Negative   Leuk Esterase: Negative / RBC: 0-2 /HPF / WBC x   Sq Epi: x / Non Sq Epi: Occasional / Bacteria: Occasional      LIVER FUNCTIONS - ( 2022 10:18 )  Alb: 2.7 g/dL / Pro: 6.3 g/dL / ALK PHOS: 59 U/L / ALT: 27 U/L / AST: 17 U/L / GGT: x             RECENT CULTURES:   @ 16:15 .Blood Blood-Peripheral     No growth to date.         @ 16:12 Clean Catch Clean Catch (Midstream)     No growth        - @ 04:00 .Blood Blood-Peripheral     No Growth Final              All imaging and data are reviewed.

## 2022-01-05 LAB
ALBUMIN SERPL ELPH-MCNC: 2.5 G/DL — LOW (ref 3.3–5)
ALP SERPL-CCNC: 54 U/L — SIGNIFICANT CHANGE UP (ref 40–120)
ALT FLD-CCNC: 25 U/L — SIGNIFICANT CHANGE UP (ref 12–78)
ANION GAP SERPL CALC-SCNC: 8 MMOL/L — SIGNIFICANT CHANGE UP (ref 5–17)
AST SERPL-CCNC: 17 U/L — SIGNIFICANT CHANGE UP (ref 15–37)
BASE EXCESS BLDV CALC-SCNC: -4.4 MMOL/L — LOW (ref -2–3)
BILIRUB SERPL-MCNC: 0.2 MG/DL — SIGNIFICANT CHANGE UP (ref 0.2–1.2)
BUN SERPL-MCNC: 40 MG/DL — HIGH (ref 7–23)
CALCIUM SERPL-MCNC: 7.9 MG/DL — LOW (ref 8.5–10.1)
CHLORIDE SERPL-SCNC: 107 MMOL/L — SIGNIFICANT CHANGE UP (ref 96–108)
CO2 SERPL-SCNC: 24 MMOL/L — SIGNIFICANT CHANGE UP (ref 22–31)
CREAT SERPL-MCNC: 2.6 MG/DL — HIGH (ref 0.5–1.3)
GAS PNL BLDV: SIGNIFICANT CHANGE UP
GLUCOSE SERPL-MCNC: 119 MG/DL — HIGH (ref 70–99)
HCO3 BLDV-SCNC: 22 MMOL/L — SIGNIFICANT CHANGE UP (ref 22–29)
HCT VFR BLD CALC: 23.6 % — LOW (ref 39–50)
HCT VFR BLD CALC: 24 % — LOW (ref 39–50)
HGB BLD-MCNC: 7.4 G/DL — LOW (ref 13–17)
HGB BLD-MCNC: 7.5 G/DL — LOW (ref 13–17)
MCHC RBC-ENTMCNC: 27.2 PG — SIGNIFICANT CHANGE UP (ref 27–34)
MCHC RBC-ENTMCNC: 31.4 GM/DL — LOW (ref 32–36)
MCV RBC AUTO: 86.8 FL — SIGNIFICANT CHANGE UP (ref 80–100)
NRBC # BLD: 0 /100 WBCS — SIGNIFICANT CHANGE UP (ref 0–0)
NT-PROBNP SERPL-SCNC: 3579 PG/ML — HIGH (ref 0–450)
PCO2 BLDV: 49 MMHG — SIGNIFICANT CHANGE UP (ref 42–55)
PH BLDV: 7.27 — LOW (ref 7.32–7.43)
PLATELET # BLD AUTO: 185 K/UL — SIGNIFICANT CHANGE UP (ref 150–400)
PO2 BLDV: 139 MMHG — HIGH (ref 25–45)
POTASSIUM SERPL-MCNC: 3.7 MMOL/L — SIGNIFICANT CHANGE UP (ref 3.5–5.3)
POTASSIUM SERPL-SCNC: 3.7 MMOL/L — SIGNIFICANT CHANGE UP (ref 3.5–5.3)
PROT SERPL-MCNC: 5.8 G/DL — LOW (ref 6–8.3)
RBC # BLD: 2.72 M/UL — LOW (ref 4.2–5.8)
RBC # FLD: 17.2 % — HIGH (ref 10.3–14.5)
SAO2 % BLDV: 98.3 % — HIGH (ref 67–88)
SODIUM SERPL-SCNC: 139 MMOL/L — SIGNIFICANT CHANGE UP (ref 135–145)
WBC # BLD: 5.21 K/UL — SIGNIFICANT CHANGE UP (ref 3.8–10.5)
WBC # FLD AUTO: 5.21 K/UL — SIGNIFICANT CHANGE UP (ref 3.8–10.5)

## 2022-01-05 PROCEDURE — 99232 SBSQ HOSP IP/OBS MODERATE 35: CPT

## 2022-01-05 PROCEDURE — 99233 SBSQ HOSP IP/OBS HIGH 50: CPT

## 2022-01-05 PROCEDURE — 71045 X-RAY EXAM CHEST 1 VIEW: CPT | Mod: 26

## 2022-01-05 RX ORDER — ACETAMINOPHEN 500 MG
650 TABLET ORAL ONCE
Refills: 0 | Status: COMPLETED | OUTPATIENT
Start: 2022-01-05 | End: 2022-01-05

## 2022-01-05 RX ORDER — ALBUTEROL 90 UG/1
2 AEROSOL, METERED ORAL EVERY 6 HOURS
Refills: 0 | Status: DISCONTINUED | OUTPATIENT
Start: 2022-01-05 | End: 2022-01-18

## 2022-01-05 RX ADMIN — LAMOTRIGINE 100 MILLIGRAM(S): 25 TABLET, ORALLY DISINTEGRATING ORAL at 11:30

## 2022-01-05 RX ADMIN — APIXABAN 5 MILLIGRAM(S): 2.5 TABLET, FILM COATED ORAL at 17:09

## 2022-01-05 RX ADMIN — BUDESONIDE AND FORMOTEROL FUMARATE DIHYDRATE 2 PUFF(S): 160; 4.5 AEROSOL RESPIRATORY (INHALATION) at 06:33

## 2022-01-05 RX ADMIN — Medication 40 MILLIGRAM(S): at 06:33

## 2022-01-05 RX ADMIN — PREGABALIN 1000 MICROGRAM(S): 225 CAPSULE ORAL at 11:31

## 2022-01-05 RX ADMIN — MIRTAZAPINE 30 MILLIGRAM(S): 45 TABLET, ORALLY DISINTEGRATING ORAL at 21:43

## 2022-01-05 RX ADMIN — Medication 0.2 MILLIGRAM(S): at 17:09

## 2022-01-05 RX ADMIN — Medication 0.2 MILLIGRAM(S): at 06:33

## 2022-01-05 RX ADMIN — ARIPIPRAZOLE 30 MILLIGRAM(S): 15 TABLET ORAL at 11:31

## 2022-01-05 RX ADMIN — APIXABAN 5 MILLIGRAM(S): 2.5 TABLET, FILM COATED ORAL at 06:33

## 2022-01-05 RX ADMIN — Medication 1: at 17:10

## 2022-01-05 RX ADMIN — Medication 650 MILLIGRAM(S): at 06:35

## 2022-01-05 RX ADMIN — Medication 5 MILLIGRAM(S): at 06:33

## 2022-01-05 RX ADMIN — Medication 2: at 12:07

## 2022-01-05 RX ADMIN — Medication 150 MILLIGRAM(S): at 11:31

## 2022-01-05 RX ADMIN — Medication 1 MILLIGRAM(S): at 11:31

## 2022-01-05 RX ADMIN — Medication 81 MILLIGRAM(S): at 11:31

## 2022-01-05 RX ADMIN — Medication 100 MILLIGRAM(S): at 17:09

## 2022-01-05 RX ADMIN — ATORVASTATIN CALCIUM 10 MILLIGRAM(S): 80 TABLET, FILM COATED ORAL at 21:43

## 2022-01-05 RX ADMIN — REMDESIVIR 500 MILLIGRAM(S): 5 INJECTION INTRAVENOUS at 21:43

## 2022-01-05 RX ADMIN — BUDESONIDE AND FORMOTEROL FUMARATE DIHYDRATE 2 PUFF(S): 160; 4.5 AEROSOL RESPIRATORY (INHALATION) at 18:44

## 2022-01-05 RX ADMIN — FAMOTIDINE 20 MILLIGRAM(S): 10 INJECTION INTRAVENOUS at 11:30

## 2022-01-05 RX ADMIN — Medication 6 MILLIGRAM(S): at 06:33

## 2022-01-05 RX ADMIN — Medication 40 MILLIGRAM(S): at 17:09

## 2022-01-05 RX ADMIN — ISOSORBIDE MONONITRATE 90 MILLIGRAM(S): 60 TABLET, EXTENDED RELEASE ORAL at 11:30

## 2022-01-05 RX ADMIN — CARVEDILOL PHOSPHATE 6.25 MILLIGRAM(S): 80 CAPSULE, EXTENDED RELEASE ORAL at 17:09

## 2022-01-05 RX ADMIN — Medication 5 MILLIGRAM(S): at 17:09

## 2022-01-05 RX ADMIN — Medication 100 MILLIGRAM(S): at 06:33

## 2022-01-05 RX ADMIN — AMLODIPINE BESYLATE 10 MILLIGRAM(S): 2.5 TABLET ORAL at 06:33

## 2022-01-05 RX ADMIN — PANTOPRAZOLE SODIUM 40 MILLIGRAM(S): 20 TABLET, DELAYED RELEASE ORAL at 06:33

## 2022-01-05 RX ADMIN — Medication 100 MILLIGRAM(S): at 21:43

## 2022-01-05 RX ADMIN — CARVEDILOL PHOSPHATE 6.25 MILLIGRAM(S): 80 CAPSULE, EXTENDED RELEASE ORAL at 06:33

## 2022-01-05 NOTE — CHART NOTE - NSCHARTNOTEFT_GEN_A_CORE
Called by RN, pt has increased WOB  Pt seen and examined at bedside. Pt states ________ Pt denies fever, chills, body aches, headache, dizziness, lightheadedness, CP, SOB, palpitations, abdominal pain, n/v/d. Pt otherwise feeling well with no other complaints.     T(F): 99.2 (01-04-22 @ 20:33), Max: 99.2 (01-04-22 @ 20:33)  HR: 61 (01-04-22 @ 20:33) (61 - 61)  BP: 121/68 (01-04-22 @ 20:33) (121/68 - 121/68)  RR: 18 (01-04-22 @ 20:33) (18 - 18)  SpO2: 91% (01-04-22 @ 20:33) (91% - 91%)    Physical Exam:  Gen: NAD  HEENT: PERRLA, moist mucous membranes  Cardio: +S1, +S2, RRR  Lungs: CTA B/L, No w/r/r, no increased WOB   Abd: soft, NT/ND, +BS x 4 quadrants, no rebound/guarding   Ext: No pedal edema, no calf tenderness, pulses intact  Neuro: AAOx3, answers questions appropriately     Assessment/Plan: 77 yo M PMH of A fib (on Eliquis) , MGUS s/p PRBC transfusion 10/21, anxiety/depression, CAD s/p stents (not on plavix), CHF, Diverticulosis, HLD, HTN, thyroid nodules, CKD stage 3, DM presents to the ED with SOB and LE edema. Admitted for acute hypoxic respiratory failure likely 2/2  CHF exacerbation.    -  -  -Will continue to monitor, RN to call if any changes Called by RN, pt has increased WOB  Pt seen and examined at bedside. Pt states he has no difficulty breathing and has no complaints. Pt denies fever, chills, body aches, headache, dizziness, lightheadedness, CP, SOB, palpitations, abdominal pain, n/v/d.      T(F): 99.2 (01-04-22 @ 20:33), Max: 99.2 (01-04-22 @ 20:33)  HR: 61 (01-04-22 @ 20:33) (61 - 61)  BP: 121/68 (01-04-22 @ 20:33) (121/68 - 121/68)  RR: 18 (01-04-22 @ 20:33) (18 - 18)  SpO2: 91% (01-04-22 @ 20:33) (91% - 91%)    Physical Exam:  Gen: NAD, sitting in chair   Cardio: +S1, +S2, RRR  Lungs: CTA B/L, decreased lung sounds b/l  Abd: soft, NT/ND, +BS x 4 quadrants, no rebound/guarding   Ext: No pedal edema, no calf tenderness, pulses intact  Neuro: Awake, alert, interactive      Assessment/Plan: 77 yo M PMH of A fib (on Eliquis) , MGUS s/p PRBC transfusion 10/21, anxiety/depression, CAD s/p stents (not on plavix), CHF, Diverticulosis, HLD, HTN, thyroid nodules, CKD stage 3, DM presents to the ED with SOB and LE edema. Admitted for acute hypoxic respiratory failure likely 2/2  CHF exacerbation.     1) Increased WOB  - Patient has no increased WOB, is asymptomatic  - Patient satting 94% on NC   - Reminded patient of the importance of keeping pulse ox on and wearing NC  -Will continue to monitor, RN to call if any changes

## 2022-01-05 NOTE — PROVIDER CONTACT NOTE (OTHER) - ACTION/TREATMENT ORDERED:
MD Holden notified and made aware. Fever work up to be ordered. PRN Tylenol administered. Continue to monitor.

## 2022-01-05 NOTE — PROGRESS NOTE ADULT - SUBJECTIVE AND OBJECTIVE BOX
Patient is a 76y old  Male who presents with a chief complaint of worsening SOB.      INTERVAL HPI/OVERNIGHT EVENTS: Pt had a fever to 100.5F early this morning. Pt states he feels improved today with less SOB. Pt de-escalated from BiPAP to NC and tolerating NC well. Pt denies CP, subjective fever, chills, palpitations.     MEDICATIONS  (STANDING):  amLODIPine   Tablet 10 milliGRAM(s) Oral daily  apixaban 5 milliGRAM(s) Oral two times a day  ARIPiprazole 30 milliGRAM(s) Oral daily  aspirin enteric coated 81 milliGRAM(s) Oral daily  atorvastatin 10 milliGRAM(s) Oral at bedtime  budesonide 160 MICROgram(s)/formoterol 4.5 MICROgram(s) Inhaler 2 Puff(s) Inhalation two times a day  carvedilol 6.25 milliGRAM(s) Oral every 12 hours  cloNIDine 0.2 milliGRAM(s) Oral two times a day  cyanocobalamin 1000 MICROGram(s) Oral daily  dexAMETHasone     Tablet 6 milliGRAM(s) Oral daily  dextrose 40% Gel 15 Gram(s) Oral once  dextrose 5%. 1000 milliLiter(s) (50 mL/Hr) IV Continuous <Continuous>  dextrose 5%. 1000 milliLiter(s) (100 mL/Hr) IV Continuous <Continuous>  dextrose 50% Injectable 25 Gram(s) IV Push once  dextrose 50% Injectable 12.5 Gram(s) IV Push once  dextrose 50% Injectable 25 Gram(s) IV Push once  famotidine    Tablet 20 milliGRAM(s) Oral daily  folic acid 1 milliGRAM(s) Oral daily  furosemide   Injectable 40 milliGRAM(s) IV Push two times a day  glucagon  Injectable 1 milliGRAM(s) IntraMuscular once  hydrALAZINE 100 milliGRAM(s) Oral three times a day  insulin lispro (ADMELOG) corrective regimen sliding scale   SubCutaneous three times a day before meals  insulin lispro (ADMELOG) corrective regimen sliding scale   SubCutaneous at bedtime  isosorbide   mononitrate ER Tablet (IMDUR) 90 milliGRAM(s) Oral daily  lamoTRIgine 100 milliGRAM(s) Oral daily  mirtazapine 30 milliGRAM(s) Oral at bedtime  oxybutynin 5 milliGRAM(s) Oral two times a day  pantoprazole    Tablet 40 milliGRAM(s) Oral before breakfast  remdesivir  IVPB   IV Intermittent   remdesivir  IVPB 100 milliGRAM(s) IV Intermittent every 24 hours  venlafaxine XR. 150 milliGRAM(s) Oral daily    MEDICATIONS  (PRN):  acetaminophen     Tablet .. 650 milliGRAM(s) Oral every 6 hours PRN Temp greater or equal to 38.5C (101.3F), Moderate Pain (4 - 6)  ALBUTerol    90 MICROgram(s) HFA Inhaler 2 Puff(s) Inhalation every 6 hours PRN Shortness of Breath and/or Wheezing      Allergies    No Known Allergies    Intolerances        REVIEW OF SYSTEMS:  CONSTITUTIONAL: No fever or chills  HEENT:  No headache, no sore throat  RESPIRATORY: +cough, + shortness of breath (improving)  CARDIOVASCULAR: No chest pain, palpitations  GASTROINTESTINAL: No abd pain, nausea, vomiting, or diarrhea  GENITOURINARY: No dysuria, frequency, or hematuria  NEUROLOGICAL: no focal weakness or dizziness  MUSCULOSKELETAL: +leg swelling (improving) ; no myalgias     Vital Signs Last 24 Hrs  T(C): 36.6 (05 Jan 2022 21:20), Max: 38.1 (05 Jan 2022 05:30)  T(F): 97.8 (05 Jan 2022 21:20), Max: 100.5 (05 Jan 2022 05:30)  HR: 58 (05 Jan 2022 21:20) (58 - 71)  BP: 135/72 (05 Jan 2022 21:20) (135/72 - 186/84)  BP(mean): --  RR: 18 (05 Jan 2022 21:20) (18 - 19)  SpO2: 90% (05 Jan 2022 21:20) (90% - 92%)    PHYSICAL EXAM:  GENERAL: NAD at rest on NC  HEENT:  anicteric, moist mucous membranes  CHEST/LUNG:  decreased breath sounds b/l bases  HEART:  irregular, S1, S2  ABDOMEN:  BS+, soft, nontender, nondistended  EXTREMITIES: + LE edema b/l, no cyanosis, or calf tenderness  NERVOUS SYSTEM: answers questions and follows commands appropriately    LABS:                        7.5    x     )-----------( x        ( 05 Jan 2022 18:50 )             24.0     CBC Full  -  ( 05 Jan 2022 18:50 )  WBC Count : x  Hemoglobin : 7.5 g/dL  Hematocrit : 24.0 %  Platelet Count - Automated : x  Mean Cell Volume : x  Mean Cell Hemoglobin : x  Mean Cell Hemoglobin Concentration : x  Auto Neutrophil # : x  Auto Lymphocyte # : x  Auto Monocyte # : x  Auto Eosinophil # : x  Auto Basophil # : x  Auto Neutrophil % : x  Auto Lymphocyte % : x  Auto Monocyte % : x  Auto Eosinophil % : x  Auto Basophil % : x    05 Jan 2022 07:57    139    |  107    |  40     ----------------------------<  119    3.7     |  24     |  2.60     Ca    7.9        05 Jan 2022 07:57    TPro  5.8    /  Alb  2.5    /  TBili  0.2    /  DBili  x      /  AST  17     /  ALT  25     /  AlkPhos  54     05 Jan 2022 07:57        CAPILLARY BLOOD GLUCOSE      POCT Blood Glucose.: 182 mg/dL (05 Jan 2022 21:07)  POCT Blood Glucose.: 151 mg/dL (05 Jan 2022 16:35)  POCT Blood Glucose.: 235 mg/dL (05 Jan 2022 11:49)  POCT Blood Glucose.: 133 mg/dL (05 Jan 2022 08:07)        Culture - Blood (collected 01-03-22 @ 16:15)  Source: .Blood Blood-Peripheral  Preliminary Report (01-04-22 @ 17:02):    No growth to date.    Culture - Blood (collected 01-03-22 @ 16:15)  Source: .Blood Blood-Peripheral  Preliminary Report (01-04-22 @ 17:02):    No growth to date.    Culture - Urine (collected 01-03-22 @ 16:12)  Source: Clean Catch Clean Catch (Midstream)  Final Report (01-04-22 @ 13:40):    No growth        RADIOLOGY & ADDITIONAL TESTS:    Personally reviewed.     Consultant(s) Notes Reviewed:  [x] YES  [ ] NO     Patient is a 76y old  Male who presents with a chief complaint of worsening SOB.       INTERVAL HPI/OVERNIGHT EVENTS: Pt had a fever to 100.5F early this morning. Pt states he feels improved today with less SOB. Pt de-escalated from BiPAP to NC and tolerating NC well. Pt denies CP, subjective fever, chills, palpitations.     MEDICATIONS  (STANDING):  amLODIPine   Tablet 10 milliGRAM(s) Oral daily  apixaban 5 milliGRAM(s) Oral two times a day  ARIPiprazole 30 milliGRAM(s) Oral daily  aspirin enteric coated 81 milliGRAM(s) Oral daily  atorvastatin 10 milliGRAM(s) Oral at bedtime  budesonide 160 MICROgram(s)/formoterol 4.5 MICROgram(s) Inhaler 2 Puff(s) Inhalation two times a day  carvedilol 6.25 milliGRAM(s) Oral every 12 hours  cloNIDine 0.2 milliGRAM(s) Oral two times a day  cyanocobalamin 1000 MICROGram(s) Oral daily  dexAMETHasone     Tablet 6 milliGRAM(s) Oral daily  dextrose 40% Gel 15 Gram(s) Oral once  dextrose 5%. 1000 milliLiter(s) (50 mL/Hr) IV Continuous <Continuous>  dextrose 5%. 1000 milliLiter(s) (100 mL/Hr) IV Continuous <Continuous>  dextrose 50% Injectable 25 Gram(s) IV Push once  dextrose 50% Injectable 12.5 Gram(s) IV Push once  dextrose 50% Injectable 25 Gram(s) IV Push once  famotidine    Tablet 20 milliGRAM(s) Oral daily  folic acid 1 milliGRAM(s) Oral daily  furosemide   Injectable 40 milliGRAM(s) IV Push two times a day  glucagon  Injectable 1 milliGRAM(s) IntraMuscular once  hydrALAZINE 100 milliGRAM(s) Oral three times a day  insulin lispro (ADMELOG) corrective regimen sliding scale   SubCutaneous three times a day before meals  insulin lispro (ADMELOG) corrective regimen sliding scale   SubCutaneous at bedtime  isosorbide   mononitrate ER Tablet (IMDUR) 90 milliGRAM(s) Oral daily  lamoTRIgine 100 milliGRAM(s) Oral daily  mirtazapine 30 milliGRAM(s) Oral at bedtime  oxybutynin 5 milliGRAM(s) Oral two times a day  pantoprazole    Tablet 40 milliGRAM(s) Oral before breakfast  remdesivir  IVPB   IV Intermittent   remdesivir  IVPB 100 milliGRAM(s) IV Intermittent every 24 hours  venlafaxine XR. 150 milliGRAM(s) Oral daily    MEDICATIONS  (PRN):  acetaminophen     Tablet .. 650 milliGRAM(s) Oral every 6 hours PRN Temp greater or equal to 38.5C (101.3F), Moderate Pain (4 - 6)  ALBUTerol    90 MICROgram(s) HFA Inhaler 2 Puff(s) Inhalation every 6 hours PRN Shortness of Breath and/or Wheezing      Allergies    No Known Allergies    Intolerances        REVIEW OF SYSTEMS:  CONSTITUTIONAL: No fever or chills  HEENT:  No headache, no sore throat  RESPIRATORY: +cough, + shortness of breath (improving)  CARDIOVASCULAR: No chest pain, palpitations  GASTROINTESTINAL: No abd pain, nausea, vomiting, or diarrhea  GENITOURINARY: No dysuria, frequency, or hematuria  NEUROLOGICAL: no focal weakness or dizziness  MUSCULOSKELETAL: +leg swelling (improving) ; no myalgias     Vital Signs Last 24 Hrs  T(C): 36.6 (05 Jan 2022 21:20), Max: 38.1 (05 Jan 2022 05:30)  T(F): 97.8 (05 Jan 2022 21:20), Max: 100.5 (05 Jan 2022 05:30)  HR: 58 (05 Jan 2022 21:20) (58 - 71)  BP: 135/72 (05 Jan 2022 21:20) (135/72 - 186/84)  BP(mean): --  RR: 18 (05 Jan 2022 21:20) (18 - 19)  SpO2: 90% (05 Jan 2022 21:20) (90% - 92%)    PHYSICAL EXAM:  GENERAL: NAD at rest on NC  HEENT:  anicteric, moist mucous membranes  CHEST/LUNG:  decreased breath sounds b/l bases  HEART:  irregular, S1, S2  ABDOMEN:  BS+, soft, nontender, nondistended  EXTREMITIES: + LE edema b/l, no cyanosis, or calf tenderness  NERVOUS SYSTEM: answers questions and follows commands appropriately    LABS:                        7.5    x     )-----------( x        ( 05 Jan 2022 18:50 )             24.0     CBC Full  -  ( 05 Jan 2022 18:50 )  WBC Count : x  Hemoglobin : 7.5 g/dL  Hematocrit : 24.0 %  Platelet Count - Automated : x  Mean Cell Volume : x  Mean Cell Hemoglobin : x  Mean Cell Hemoglobin Concentration : x  Auto Neutrophil # : x  Auto Lymphocyte # : x  Auto Monocyte # : x  Auto Eosinophil # : x  Auto Basophil # : x  Auto Neutrophil % : x  Auto Lymphocyte % : x  Auto Monocyte % : x  Auto Eosinophil % : x  Auto Basophil % : x    05 Jan 2022 07:57    139    |  107    |  40     ----------------------------<  119    3.7     |  24     |  2.60     Ca    7.9        05 Jan 2022 07:57    TPro  5.8    /  Alb  2.5    /  TBili  0.2    /  DBili  x      /  AST  17     /  ALT  25     /  AlkPhos  54     05 Jan 2022 07:57        CAPILLARY BLOOD GLUCOSE      POCT Blood Glucose.: 182 mg/dL (05 Jan 2022 21:07)  POCT Blood Glucose.: 151 mg/dL (05 Jan 2022 16:35)  POCT Blood Glucose.: 235 mg/dL (05 Jan 2022 11:49)  POCT Blood Glucose.: 133 mg/dL (05 Jan 2022 08:07)        Culture - Blood (collected 01-03-22 @ 16:15)  Source: .Blood Blood-Peripheral  Preliminary Report (01-04-22 @ 17:02):    No growth to date.    Culture - Blood (collected 01-03-22 @ 16:15)  Source: .Blood Blood-Peripheral  Preliminary Report (01-04-22 @ 17:02):    No growth to date.    Culture - Urine (collected 01-03-22 @ 16:12)  Source: Clean Catch Clean Catch (Midstream)  Final Report (01-04-22 @ 13:40):    No growth        RADIOLOGY & ADDITIONAL TESTS:    Personally reviewed.     Consultant(s) Notes Reviewed:  [x] YES  [ ] NO

## 2022-01-05 NOTE — PROGRESS NOTE ADULT - ASSESSMENT
75 yo M PMH of A fib, MGUS s/p PRBC transfusion 10/21, anxiety/depression, CAD s/p stents (not on plavix), CHF, Diverticulosis, HLD, HTN, thyroid nodules, CKD stage 3, DM, who presented with SOB and bilateral LE edema.     Symptoms probably related to being volume overloaded, but patient also COVID positive. Fever probably related to COVID. He has no leukocytosis and blood cultures currently no growth.     -continue remdesivir, if creatinine continues to go up consider stopping  -continue steroids to complete 10 days  -AC per institution protocol  -will sign off, please call ID if any further questions. Thank you.    Cheyenne Ochoa MD  Division of Infectious Diseases   Cell [phone] between 8am and 6pm   After 6pm and weekends please call ID service at [phone].

## 2022-01-05 NOTE — PROGRESS NOTE ADULT - ASSESSMENT
77 yo M PMH of A fib (on Eliquis) , MGUS s/p PRBC transfusion 10/21, anxiety/depression, CAD s/p stents (not on plavix), CHF, Diverticulosis, HLD, HTN, thyroid nodules, CKD stage 3, DM presents to the ED with SOB    increased WOB  will repeat VBG this am   may need NIPPV  monitor VS and Sat    Previous echo 10/21 showed: Normal LV size with normal LV systolic function with estimated LVEF 55-60%. LV diastolic function cannot determine due to atrial fibrillation  Obesity - exam and risk factors - and features - c/w ELMER - outpatient eval  AF - on AC -   D dimer noted - serial D dimer  [facility]vs rx regimen - diuresis - cxr and proBNP c/w Vol Overload - Cardio eval in progress - old records and TTE reviewed - I and O, serial CE -   dietary discretion  Covid - in vaccinated pt - monitor VS and Sat - Acap and Robitussin PRN - monitor VS and HD and Sat - Decadron is indicated in covid with hypoxemia  pt is on Home Rx regimen of Symbicort - unclear indication - ex smoker - no history of COPD as per pt or medical record - prior CT without Emphysema  isolation for covid -

## 2022-01-05 NOTE — PROGRESS NOTE ADULT - ASSESSMENT
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 Found to be covid positive. Cardio consulted for elevated troponins and CHF.    Volume overloaded, CAD, HTN, HLD  -hypervolemic on exam , BNP 4574  -continue iv lasix BID, monitor renal closely   -strict intake and output , negative 2 liters   -supplemental oxygen   - Previous echo 10/21 showed: Normal LV size with normal LV systolic function with estimated LVEF 55-60%. LV diastolic function cannot determine due to atrial fibrillation. Mild mitral regurgitation is present. Mild aortic stenosis is  present. Mild tricuspid regurgitation is present . No evidence of any pulmonary hypertension  - trend troponin to peak, ,monitor on tele overnight afib 70 no events   likely demand ischemia in setting of above, plan for outpatient stress when underlying covid resolves   - continue home eliquis for afib.  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 Found to be covid positive. Cardio consulted for elevated troponins and CHF.    Volume overloaded, CAD, HTN, HLD  -hypervolemic on exam , BNP 4574  -continue iv lasix BID, monitor renal closely   -strict intake and output , negative 2 liters   -supplemental oxygen   - Previous echo 10/21 showed: Normal LV size with normal LV systolic function with estimated LVEF 55-60%. LV diastolic function cannot determine due to atrial fibrillation. Mild mitral regurgitation is present. Mild aortic stenosis is  present. Mild tricuspid regurgitation is present . No evidence of any pulmonary hypertension  - trend troponin to peak, ,monitor on tele overnight afib 70 no events   likely demand ischemia in setting of above, plan for outpatient stress when underlying covid resolves   - continue home eliquis for afib.  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 Found to be covid positive. Cardio consulted for elevated troponins and CHF.    Volume overloaded, CAD, HTN, HLD  -hypervolemic on exam , BNP 4574  - iv lasix BID, monitor renal closely   -strict intake and output , negative 2 liters   -supplemental oxygen   - Previous echo 10/21 showed: Normal LV size with normal LV systolic function with estimated LVEF 55-60%. LV diastolic function cannot determine due to atrial fibrillation. Mild mitral regurgitation is present. Mild aortic stenosis is  present. Mild tricuspid regurgitation is present . No evidence of any pulmonary hypertension  - trend troponin to peak, ,monitor on tele overnight afib 70 no events   likely demand ischemia in setting of above, plan for outpatient stress when underlying covid resolves   - continue home eliquis for afib.  - continue coreg, norvasc, hydralazine , isosorbide   -repeat bp now that am medications given   -anemia noted- if transfusing give lasix in between, given CAD should keep hgb > 8   -COVID management as per primary   - monitor and replete lytes, keep K>4, Mg>2  Carmelina Leger FNP-C  Cardiology NP  [facility] [phone]

## 2022-01-05 NOTE — PROGRESS NOTE ADULT - SUBJECTIVE AND OBJECTIVE BOX
[facility]  INFECTIOUS DISEASES - [address], [place] [address]  Tel: [phone]     Fax: [phone]  =======================================================    TRIPP ZARATE [id number]    Follow up: Tmax of 100.5 this AM. Remains on O2 via nasal cannula. Has occasional cough but breathing improved. Denies any pain.    Allergies:  No Known Allergies      Antibiotics:  acetaminophen     Tablet .. 650 milliGRAM(s) Oral every 6 hours PRN  ALBUTerol    90 MICROgram(s) HFA Inhaler 2 Puff(s) Inhalation every 6 hours PRN  amLODIPine   Tablet 10 milliGRAM(s) Oral daily  apixaban 5 milliGRAM(s) Oral two times a day  ARIPiprazole 30 milliGRAM(s) Oral daily  aspirin enteric coated 81 milliGRAM(s) Oral daily  atorvastatin 10 milliGRAM(s) Oral at bedtime  budesonide 160 MICROgram(s)/formoterol 4.5 MICROgram(s) Inhaler 2 Puff(s) Inhalation two times a day  carvedilol 6.25 milliGRAM(s) Oral every 12 hours  cloNIDine 0.2 milliGRAM(s) Oral two times a day  cyanocobalamin 1000 MICROGram(s) Oral daily  dexAMETHasone     Tablet 6 milliGRAM(s) Oral daily  dextrose 40% Gel 15 Gram(s) Oral once  dextrose 5%. 1000 milliLiter(s) IV Continuous <Continuous>  dextrose 5%. 1000 milliLiter(s) IV Continuous <Continuous>  dextrose 50% Injectable 25 Gram(s) IV Push once  dextrose 50% Injectable 12.5 Gram(s) IV Push once  dextrose 50% Injectable 25 Gram(s) IV Push once  famotidine    Tablet 20 milliGRAM(s) Oral daily  folic acid 1 milliGRAM(s) Oral daily  furosemide   Injectable 40 milliGRAM(s) IV Push two times a day  glucagon  Injectable 1 milliGRAM(s) IntraMuscular once  hydrALAZINE 100 milliGRAM(s) Oral three times a day  insulin lispro (ADMELOG) corrective regimen sliding scale   SubCutaneous three times a day before meals  insulin lispro (ADMELOG) corrective regimen sliding scale   SubCutaneous at bedtime  isosorbide   mononitrate ER Tablet (IMDUR) 90 milliGRAM(s) Oral daily  lamoTRIgine 100 milliGRAM(s) Oral daily  mirtazapine 30 milliGRAM(s) Oral at bedtime  oxybutynin 5 milliGRAM(s) Oral two times a day  pantoprazole    Tablet 40 milliGRAM(s) Oral before breakfast  remdesivir  IVPB   IV Intermittent   remdesivir  IVPB 100 milliGRAM(s) IV Intermittent every 24 hours  venlafaxine XR. 150 milliGRAM(s) Oral daily       REVIEW OF SYSTEMS:  CONSTITUTIONAL:  (+)Fevers   CARDIOVASCULAR:  No chest pain   RESPIRATORY:  see history  GASTROINTESTINAL:  No abdominal pain. no nausea or vomiting  GENITOURINARY:  No dysuria  NEUROLOGIC:  No dizziness     Physical Exam:  ICU Vital Signs Last 24 Hrs  T(C): 36.7 (05 Jan 2022 12:50), Max: 38.1 (05 Jan 2022 05:30)  T(F): 98 (05 Jan 2022 12:50), Max: 100.5 (05 Jan 2022 05:30)  HR: 58 (05 Jan 2022 17:07) (58 - 71)  BP: 174/92 (05 Jan 2022 17:07) (155/70 - 186/84)  BP(mean): --  ABP: --  ABP(mean): --  RR: 18 (05 Jan 2022 12:50) (18 - 19)  SpO2: 92% (05 Jan 2022 12:50) (91% - 92%)       GEN: NAD  HEENT: normocephalic and atraumatic.  NECK: Supple.   LUNGS: Clear to auscultation.  HEART: Regular rate and rhythm  ABDOMEN: Soft, nontender  EXTREMITIES: bipedal edema, no erythema    Labs:  01-05    139  |  107  |  40<H>  ----------------------------<  119<H>  3.7   |  24  |  2.60<H>    Ca    7.9<L>      05 Jan 2022 07:57  Phos  3.5     01-04  Mg     2.1     01-04    TPro  5.8<L>  /  Alb  2.5<L>  /  TBili  0.2  /  DBili  x   /  AST  17  /  ALT  25  /  AlkPhos  54  01-05                          7.5    x     )-----------( x        ( 05 Jan 2022 18:50 )             24.0         LIVER FUNCTIONS - ( 05 Jan 2022 07:57 )  Alb: 2.5 g/dL / Pro: 5.8 g/dL / ALK PHOS: 54 U/L / ALT: 25 U/L / AST: 17 U/L / GGT: x             RECENT CULTURES:  01-03 @ 16:15 .Blood Blood-Peripheral     No growth to date.        01-03 @ 16:12 Clean Catch Clean Catch (Midstream)     No growth        12-24 @ 04:00 .Blood Blood-Peripheral     No Growth Final              All imaging and data are reviewed.

## 2022-01-05 NOTE — PROGRESS NOTE ADULT - ASSESSMENT
77yo M from group home, with PMH of DM2, HTN, HLD, CAD (s/p stents), Afib (on Eliquis), stage 3 CKD, hx of MGUS; a/w acute hypoxic and hypercarbic respiratory failure due to COVID-19 PNA, and acute on chronic diastolic CHF.    #Acute hypoxic and hypercarbic respiratory failure due to COVID19 PNA and acute CHF:  -blood gas improving significantly   -d/w ID re remdesivir keeping in mind renal dysfunction, will continue for now, but may d/c if renal function worsening.  -continue dexamethasone 6mg daily - plan for 10-day course.  -continue suppl O2 support - on 3L currently - wean as tolerated  -airborne/contact precautions  -trending CBC diff, CMP, and  inflammatory markers  -Pulm/ID following ([facility]), recs appreciated    Acute on chronic diastolic CHF:  - c/w lasix 40mg IV bid; strict I/Os; monitoring hemodynamics/renal function w/diuresis  - monitor O2, daily weights    cardio ([facility]), recs appreciated    ARIELLA on CKD 3:  - baseline Cr appears to be ~2.0 - renally dose meds, no nephrotoxics    HTN urgency:  - continue coreg, clonidine, norvasc, hydralazine, lasix    Afib:  - c/w Eliquis  - c/w coreg    Type 2 DM:  - goal glucose 100-180  - monitor FSG lazara as pt on decadron   - c/w HISS    psych - continue abilify per home meds    DVT prophy   - c/w Eliquis     75yo M from group home, with PMH of DM2, HTN, HLD, CAD (s/p stents), Afib (on Eliquis), stage 3 CKD, hx of MGUS; a/w acute hypoxic and hypercarbic respiratory failure due to COVID-19 PNA, and acute on chronic diastolic CHF.    #Acute hypoxic and hypercarbic respiratory failure due to COVID19 PNA and acute CHF:  -blood gas improving significantly   -d/w ID re remdesivir keeping in mind renal dysfunction, will continue for now, but may d/c if renal function worsening.  -continue dexamethasone 6mg daily - plan for 10-day course.  -continue suppl O2 support - on 3L currently - wean as tolerated  -airborne/contact precautions  -trending CBC diff, CMP, and  inflammatory markers  -Pulm/ID following ([facility]b/Don), recs appreciated    Acute on chronic diastolic CHF:  - c/w lasix 40mg IV bid; strict I/Os; monitoring hemodynamics/renal function w/diuresis  - monitor O2, daily weights   - cardio ([facility]), recs appreciated    ARIELLA on CKD 3:  - baseline Cr appears to be ~2.0 - renally dose meds, no nephrotoxics  - Cr up to 2.6 currently, but could be in part increased due to necessary diuresis  - nephro ([facility]), recs appreciated    Anemia:  - pt with hx of MGUS and CKD likely contributing to anemia, along with current acute illness  - Hgb has trended down   - no gross hematuria, hematochezia, melena; monitor for signs of blood loss  - repeat Hgb in the afternoon stable from the morning ; discussed blood transfusion with the pt who is hesitant as he is feeling overall better  - Retacrit per nephro orders for the renal failure component of anemia   - if pt agreeable to transfusion, will transfuse for goal Hgb > 8    HTN urgency:  - continue coreg, clonidine, norvasc, hydralazine, lasix    Afib:  - c/w Eliquis  - c/w coreg    Type 2 DM:  - goal glucose 100-180  - monitor FSG lazara as pt on decadron   - c/w HISS    psych - continue abilify per home meds    DVT prophy   - c/w Eliquis     75yo M from group home, with PMH of DM2, HTN, HLD, CAD (s/p stents), Afib (on Eliquis), stage 3 CKD, hx of MGUS; a/w acute hypoxic and hypercarbic respiratory failure due to COVID-19 PNA, and acute on chronic diastolic CHF.    #Acute hypoxic and hypercarbic respiratory failure due to COVID19 PNA and acute CHF:  -blood gas improving significantly   -d/w ID re remdesivir keeping in mind renal dysfunction, will continue for now, but may d/c if renal function worsening.  -continue dexamethasone 6mg daily - plan for 10-day course.  -continue suppl O2 support - on 3L currently - wean as tolerated  -airborne/contact precautions  -trending CBC diff, CMP, and  inflammatory markers  -Pulm/ID following ([facility]), recs appreciated    Acute on chronic diastolic CHF:  - c/w lasix 40mg IV bid; strict I/Os; monitoring hemodynamics/renal function w/diuresis  - monitor O2, daily weights   - cardio ([facility]), recs appreciated    ARIELLA on CKD 3:  - baseline Cr appears to be ~2.0 - renally dose meds, no nephrotoxics  - Cr up to 2.6 currently, but could be in part increased due to necessary diuresis  - nephro ([facility]), recs appreciated    Anemia:  - pt with hx of MGUS and CKD likely contributing to anemia, along with current acute illness  - Hgb has trended down   - no gross hematuria, hematochezia, melena; monitor for signs of blood loss  - repeat Hgb in the afternoon stable from the morning ; discussed blood transfusion with the pt who is hesitant as he is feeling overall better  - Retacrit per nephro orders for the renal failure component of anemia   - if pt agreeable to transfusion, will transfuse for goal Hgb > 8    HTN urgency:  - continue coreg, clonidine, norvasc, hydralazine, lasix    Afib:  - c/w Eliquis  - c/w coreg    Type 2 DM:  - goal glucose 100-180  - monitor FSG lazara as pt on decadron   - c/w HISS    psych - continue abilify, lamictal, effexor    DVT prophy   - c/w Eliquis

## 2022-01-05 NOTE — PROGRESS NOTE ADULT - SUBJECTIVE AND OBJECTIVE BOX
[facility] -- Fifi Bliss, Bonifacio, Kalpesh, Abraham Sheridan Savella  Office # [phone]      Follow Up:    HLD AFib   Subjective/Observations:   Seen at bedside, + fever overnight + LE edema   Denies chest pain dizziness + cough + myalgias     REVIEW OF SYSTEMS: All other review of systems is negative unless indicated above    PAST MEDICAL & SURGICAL HISTORY:  HTN (hypertension)  c/b multiple episodes of hypertensive urgency    HLD (hyperlipidemia)    Atrial fibrillation  first documented on EKG 10/7/2021    CAD (coronary artery disease)  s/p stents (not on plavix)    Type 2 diabetes mellitus  not on home insulin/ Meds    Anxiety  Depression  multiple psych medications    History of diverticulitis  07/2021    Diverticulosis  c/b GIB in 2020    Afib  on AC    Stage 3 chronic kidney disease    Anemia of chronic disease  Monoclonal Gammopathy-MGUS  pRBC transfusion 10/15/21    Chronic diastolic congestive heart failure    Multiple thyroid nodules    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  Had surgery ( April 2013 )    S/P tonsillectomy    S/P arthroscopic knee surgery  Bilateral ( 2005 )    Torsion of testicle  Had surgery at age 14    Pilonidal cyst  Had surgery ( 1969 )    S/P cataract surgery  Bilateral    H/O hernia repair        MEDICATIONS  (STANDING):  amLODIPine   Tablet 10 milliGRAM(s) Oral daily  apixaban 5 milliGRAM(s) Oral two times a day  ARIPiprazole 30 milliGRAM(s) Oral daily  aspirin enteric coated 81 milliGRAM(s) Oral daily  atorvastatin 10 milliGRAM(s) Oral at bedtime  budesonide 160 MICROgram(s)/formoterol 4.5 MICROgram(s) Inhaler 2 Puff(s) Inhalation two times a day  carvedilol 6.25 milliGRAM(s) Oral every 12 hours  cloNIDine 0.2 milliGRAM(s) Oral two times a day  cyanocobalamin 1000 MICROGram(s) Oral daily  dexAMETHasone     Tablet 6 milliGRAM(s) Oral daily  dextrose 40% Gel 15 Gram(s) Oral once  dextrose 5%. 1000 milliLiter(s) (50 mL/Hr) IV Continuous <Continuous>  dextrose 5%. 1000 milliLiter(s) (100 mL/Hr) IV Continuous <Continuous>  dextrose 50% Injectable 12.5 Gram(s) IV Push once  dextrose 50% Injectable 25 Gram(s) IV Push once  dextrose 50% Injectable 25 Gram(s) IV Push once  famotidine    Tablet 20 milliGRAM(s) Oral daily  folic acid 1 milliGRAM(s) Oral daily  furosemide   Injectable 40 milliGRAM(s) IV Push two times a day  glucagon  Injectable 1 milliGRAM(s) IntraMuscular once  hydrALAZINE 100 milliGRAM(s) Oral three times a day  insulin lispro (ADMELOG) corrective regimen sliding scale   SubCutaneous three times a day before meals  insulin lispro (ADMELOG) corrective regimen sliding scale   SubCutaneous at bedtime  isosorbide   mononitrate ER Tablet (IMDUR) 90 milliGRAM(s) Oral daily  lamoTRIgine 100 milliGRAM(s) Oral daily  mirtazapine 30 milliGRAM(s) Oral at bedtime  oxybutynin 5 milliGRAM(s) Oral two times a day  pantoprazole    Tablet 40 milliGRAM(s) Oral before breakfast  remdesivir  IVPB   IV Intermittent   remdesivir  IVPB 100 milliGRAM(s) IV Intermittent every 24 hours  venlafaxine XR. 150 milliGRAM(s) Oral daily    MEDICATIONS  (PRN):  acetaminophen     Tablet .. 650 milliGRAM(s) Oral every 6 hours PRN Temp greater or equal to 38.5C (101.3F), Moderate Pain (4 - 6)  ALBUTerol    90 MICROgram(s) HFA Inhaler 2 Puff(s) Inhalation every 6 hours PRN Shortness of Breath and/or Wheezing      Allergies    No Known Allergies    Intolerances        Vital Signs Last 24 Hrs  T(C): 36.8 (05 Jan 2022 09:31), Max: 38.1 (05 Jan 2022 05:30)  T(F): 98.2 (05 Jan 2022 09:31), Max: 100.5 (05 Jan 2022 05:30)  HR: 70 (05 Jan 2022 09:31) (61 - 71)  BP: 186/84 (05 Jan 2022 05:30) (121/68 - 186/84)  BP(mean): --  RR: 19 (05 Jan 2022 05:30) (18 - 19)  SpO2: 91% (05 Jan 2022 05:30) (91% - 91%)    I&O's Summary    04 Jan 2022 07:01  -  05 Jan 2022 07:00  --------------------------------------------------------  IN: 0 mL / OUT: 2050 mL / NET: -2050 mL          PHYSICAL EXAM:  TELE:    Constitutional: NAD, awake and alert, obese  HEENT: Moist Mucous Membranes, Anicteric  Pulmonary: Non-labored, breath sounds are dim   Cardiovascular: Regular, S1 and S2 nl, No murmurs, rubs, gallops or clicks  Gastrointestinal: Bowel Sounds present, soft, nontender.   Lymph: + peripheral edema.  Skin: No visible rashes or ulcers.  Psych:  Mood & affect appropriate    LABS: All Labs Reviewed:                        7.4    5.21  )-----------( 185      ( 05 Jan 2022 07:57 )             23.6                         8.5    6.03  )-----------( 171      ( 04 Jan 2022 10:18 )             27.0                         9.2    7.59  )-----------( 201      ( 03 Jan 2022 11:14 )             29.1     05 Jan 2022 07:57    139    |  107    |  40     ----------------------------<  119    3.7     |  24     |  2.60   04 Jan 2022 10:18    139    |  107    |  34     ----------------------------<  181    3.8     |  23     |  2.20   03 Jan 2022 11:14    140    |  110    |  28     ----------------------------<  136    3.3     |  22     |  2.30     Ca    7.9        05 Jan 2022 07:57  Ca    8.3        04 Jan 2022 10:18  Ca    8.2        03 Jan 2022 11:14  Phos  3.5       04 Jan 2022 10:18  Mg     2.1       04 Jan 2022 10:18    TPro  5.8    /  Alb  2.5    /  TBili  0.2    /  DBili  x      /  AST  17     /  ALT  25     /  AlkPhos  54     05 Jan 2022 07:57  TPro  6.3    /  Alb  2.7    /  TBili  0.4    /  DBili  x      /  AST  17     /  ALT  27     /  AlkPhos  59     04 Jan 2022 10:18  TPro  6.5    /  Alb  3.0    /  TBili  0.4    /  DBili  x      /  AST  18     /  ALT  28     /  AlkPhos  65     03 Jan 2022 11:14

## 2022-01-05 NOTE — PROGRESS NOTE ADULT - ASSESSMENT
CKD 4  Diabetes  CHF  Hypertension  h/o SLE  Hypokalemia  Anemia    Stable renal indices. On IV lasix. Retacrit for anemia. Monitor blood sugar levels. Insulin coverage as needed.   Monitor h/h trend. Potassium supplementation. Monitor BP trend. Titrate BP meds as needed. Salt restriction.   Will follow electrolytes and renal function trend. Avoid nephrotoxic meds as possible.

## 2022-01-05 NOTE — CHART NOTE - NSCHARTNOTEFT_GEN_A_CORE
RN called to report patient spiked a fever of 100.5F, new fever. Will get blood cultures x2, UA, urine cx, CXR, and give tylenol. Patient also had a BP of 186/82, patient is due for his morning blood pressure meds, will administer morning meds and recheck blood pressure. Will continue to monitor, RN to call w/ any changes.

## 2022-01-05 NOTE — PROGRESS NOTE ADULT - SUBJECTIVE AND OBJECTIVE BOX
Date/Time Patient Seen:  		  Referring MD:   Data Reviewed	       Patient is a 76y old  Male who presents with a chief complaint of CHF exacerbation (04 Jan 2022 17:08)      Subjective/HPI     PAST MEDICAL & SURGICAL HISTORY:  Hypertension    Diabetes mellitus    Lupus    Hepatitis C    Anxiety and depression    CAD (coronary artery disease)  s/p stents    Diverticulosis    Hyperlipidemia    HTN (hypertension)  c/b multiple episodes of hypertensive urgency    HLD (hyperlipidemia)    Atrial fibrillation  first documented on EKG 10/7/2021    CAD (coronary artery disease)  s/p stents (not on plavix)    Type 2 diabetes mellitus  not on home insulin/ Meds    Anxiety  Depression  multiple psych medications    History of diverticulitis  07/2021    Diverticulosis  c/b GIB in 2020    Afib  on AC    Stage 3 chronic kidney disease    Anemia of chronic disease  Monoclonal Gammopathy-MGUS  pRBC transfusion 10/15/21    Chronic diastolic congestive heart failure    Multiple thyroid nodules    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  Had surgery ( April 2013 )    S/P tonsillectomy    S/P arthroscopic knee surgery  Bilateral ( 2005 )    Torsion of testicle  Had surgery at age 14    Pilonidal cyst  Had surgery ( 1969 )    S/P cataract surgery  Bilateral    H/O hernia repair          Medication list         MEDICATIONS  (STANDING):  amLODIPine   Tablet 10 milliGRAM(s) Oral daily  apixaban 5 milliGRAM(s) Oral two times a day  ARIPiprazole 30 milliGRAM(s) Oral daily  aspirin enteric coated 81 milliGRAM(s) Oral daily  atorvastatin 10 milliGRAM(s) Oral at bedtime  budesonide 160 MICROgram(s)/formoterol 4.5 MICROgram(s) Inhaler 2 Puff(s) Inhalation two times a day  carvedilol 6.25 milliGRAM(s) Oral every 12 hours  cloNIDine 0.2 milliGRAM(s) Oral two times a day  cyanocobalamin 1000 MICROGram(s) Oral daily  dexAMETHasone     Tablet 6 milliGRAM(s) Oral daily  dextrose 40% Gel 15 Gram(s) Oral once  dextrose 5%. 1000 milliLiter(s) (50 mL/Hr) IV Continuous <Continuous>  dextrose 5%. 1000 milliLiter(s) (100 mL/Hr) IV Continuous <Continuous>  dextrose 50% Injectable 25 Gram(s) IV Push once  dextrose 50% Injectable 12.5 Gram(s) IV Push once  dextrose 50% Injectable 25 Gram(s) IV Push once  famotidine    Tablet 20 milliGRAM(s) Oral daily  folic acid 1 milliGRAM(s) Oral daily  furosemide   Injectable 40 milliGRAM(s) IV Push two times a day  glucagon  Injectable 1 milliGRAM(s) IntraMuscular once  hydrALAZINE 100 milliGRAM(s) Oral three times a day  insulin lispro (ADMELOG) corrective regimen sliding scale   SubCutaneous three times a day before meals  insulin lispro (ADMELOG) corrective regimen sliding scale   SubCutaneous at bedtime  isosorbide   mononitrate ER Tablet (IMDUR) 90 milliGRAM(s) Oral daily  lamoTRIgine 100 milliGRAM(s) Oral daily  mirtazapine 30 milliGRAM(s) Oral at bedtime  oxybutynin 5 milliGRAM(s) Oral two times a day  pantoprazole    Tablet 40 milliGRAM(s) Oral before breakfast  remdesivir  IVPB   IV Intermittent   remdesivir  IVPB 100 milliGRAM(s) IV Intermittent every 24 hours  venlafaxine XR. 150 milliGRAM(s) Oral daily    MEDICATIONS  (PRN):  acetaminophen     Tablet .. 650 milliGRAM(s) Oral every 6 hours PRN Temp greater or equal to 38.5C (101.3F), Moderate Pain (4 - 6)  ALBUTerol    90 MICROgram(s) HFA Inhaler 2 Puff(s) Inhalation every 6 hours PRN Shortness of Breath and/or Wheezing         Vitals log        ICU Vital Signs Last 24 Hrs  T(C): 38.1 (05 Jan 2022 05:30), Max: 38.1 (05 Jan 2022 05:30)  T(F): 100.5 (05 Jan 2022 05:30), Max: 100.5 (05 Jan 2022 05:30)  HR: 71 (05 Jan 2022 05:30) (61 - 74)  BP: 121/68 (04 Jan 2022 20:33) (121/68 - 152/76)  BP(mean): --  ABP: --  ABP(mean): --  RR: 19 (05 Jan 2022 05:30) (16 - 19)  SpO2: 91% (05 Jan 2022 05:30) (91% - 97%)           Input and Output:  I&O's Detail    04 Jan 2022 07:01  -  05 Jan 2022 05:33  --------------------------------------------------------  IN:  Total IN: 0 mL    OUT:    Voided (mL): 1350 mL  Total OUT: 1350 mL    Total NET: -1350 mL          Lab Data                        8.5    6.03  )-----------( 171      ( 04 Jan 2022 10:18 )             27.0     01-04    139  |  107  |  34<H>  ----------------------------<  181<H>  3.8   |  23  |  2.20<H>    Ca    8.3<L>      04 Jan 2022 10:18  Phos  3.5     01-04  Mg     2.1     01-04    TPro  6.3  /  Alb  2.7<L>  /  TBili  0.4  /  DBili  x   /  AST  17  /  ALT  27  /  AlkPhos  59  01-04            Review of Systems	      Objective     Physical Examination  heart s1s2  lung dec BS  abd soft  head nc  verbal        Pertinent Lab findings & Imaging      Nikko:  NO   Adequate UO     I&O's Detail    04 Jan 2022 07:01  -  05 Jan 2022 05:33  --------------------------------------------------------  IN:  Total IN: 0 mL    OUT:    Voided (mL): 1350 mL  Total OUT: 1350 mL    Total NET: -1350 mL               Discussed with:     Cultures:	        Radiology

## 2022-01-05 NOTE — PROGRESS NOTE ADULT - SUBJECTIVE AND OBJECTIVE BOX
Patient is a 76y old  Male who presents with a chief complaint of CHF exacerbation (05 Jan 2022 12:02)    Patient seen in follow up for CKD 4.        PAST MEDICAL HISTORY:  Hypertension    Diabetes mellitus    Lupus    Hepatitis C    Anxiety and depression    CAD (coronary artery disease)    Diverticulosis    Hyperlipidemia    HTN (hypertension)    HLD (hyperlipidemia)    Atrial fibrillation    CAD (coronary artery disease)    Type 2 diabetes mellitus    Anxiety    History of diverticulitis    Diverticulosis    Afib    Stage 3 chronic kidney disease    Anemia of chronic disease    Chronic diastolic congestive heart failure    Multiple thyroid nodules      MEDICATIONS  (STANDING):  amLODIPine   Tablet 10 milliGRAM(s) Oral daily  apixaban 5 milliGRAM(s) Oral two times a day  ARIPiprazole 30 milliGRAM(s) Oral daily  aspirin enteric coated 81 milliGRAM(s) Oral daily  atorvastatin 10 milliGRAM(s) Oral at bedtime  budesonide 160 MICROgram(s)/formoterol 4.5 MICROgram(s) Inhaler 2 Puff(s) Inhalation two times a day  carvedilol 6.25 milliGRAM(s) Oral every 12 hours  cloNIDine 0.2 milliGRAM(s) Oral two times a day  cyanocobalamin 1000 MICROGram(s) Oral daily  dexAMETHasone     Tablet 6 milliGRAM(s) Oral daily  dextrose 40% Gel 15 Gram(s) Oral once  dextrose 5%. 1000 milliLiter(s) (50 mL/Hr) IV Continuous <Continuous>  dextrose 5%. 1000 milliLiter(s) (100 mL/Hr) IV Continuous <Continuous>  dextrose 50% Injectable 25 Gram(s) IV Push once  dextrose 50% Injectable 12.5 Gram(s) IV Push once  dextrose 50% Injectable 25 Gram(s) IV Push once  famotidine    Tablet 20 milliGRAM(s) Oral daily  folic acid 1 milliGRAM(s) Oral daily  furosemide   Injectable 40 milliGRAM(s) IV Push two times a day  glucagon  Injectable 1 milliGRAM(s) IntraMuscular once  hydrALAZINE 100 milliGRAM(s) Oral three times a day  insulin lispro (ADMELOG) corrective regimen sliding scale   SubCutaneous three times a day before meals  insulin lispro (ADMELOG) corrective regimen sliding scale   SubCutaneous at bedtime  isosorbide   mononitrate ER Tablet (IMDUR) 90 milliGRAM(s) Oral daily  lamoTRIgine 100 milliGRAM(s) Oral daily  mirtazapine 30 milliGRAM(s) Oral at bedtime  oxybutynin 5 milliGRAM(s) Oral two times a day  pantoprazole    Tablet 40 milliGRAM(s) Oral before breakfast  remdesivir  IVPB   IV Intermittent   remdesivir  IVPB 100 milliGRAM(s) IV Intermittent every 24 hours  venlafaxine XR. 150 milliGRAM(s) Oral daily    MEDICATIONS  (PRN):  acetaminophen     Tablet .. 650 milliGRAM(s) Oral every 6 hours PRN Temp greater or equal to 38.5C (101.3F), Moderate Pain (4 - 6)  ALBUTerol    90 MICROgram(s) HFA Inhaler 2 Puff(s) Inhalation every 6 hours PRN Shortness of Breath and/or Wheezing    T(C): 36.7 (01-05-22 @ 12:50), Max: 38.1 (01-05-22 @ 05:30)  HR: 58 (01-05-22 @ 17:07) (58 - 74)  BP: 174/92 (01-05-22 @ 17:07) (121/68 - 186/84)  RR: 18 (01-05-22 @ 12:50) (16 - 19)  SpO2: 92% (01-05-22 @ 12:50) (91% - 97%)  Wt(kg): --  I&O's Detail    04 Jan 2022 07:01  -  05 Jan 2022 07:00  --------------------------------------------------------  IN:  Total IN: 0 mL    OUT:    Voided (mL): 2050 mL  Total OUT: 2050 mL    Total NET: -2050 mL      05 Jan 2022 07:01  -  05 Jan 2022 18:32  --------------------------------------------------------  IN:  Total IN: 0 mL    OUT:    Voided (mL): 800 mL  Total OUT: 800 mL    Total NET: -800 mL          PHYSICAL EXAM:  General: No distress  Respiratory: b/l air entry  Cardiovascular: S1 S2  Gastrointestinal: soft  Extremities:  + edema                              7.4    5.21  )-----------( 185      ( 05 Jan 2022 07:57 )             23.6     01-05    139  |  107  |  40<H>  ----------------------------<  119<H>  3.7   |  24  |  2.60<H>    Ca    7.9<L>      05 Jan 2022 07:57  Phos  3.5     01-04  Mg     2.1     01-04    TPro  5.8<L>  /  Alb  2.5<L>  /  TBili  0.2  /  DBili  x   /  AST  17  /  ALT  25  /  AlkPhos  54  01-05        LIVER FUNCTIONS - ( 05 Jan 2022 07:57 )  Alb: 2.5 g/dL / Pro: 5.8 g/dL / ALK PHOS: 54 U/L / ALT: 25 U/L / AST: 17 U/L / GGT: x               Sodium, Serum: 139 (01-05 @ 07:57)  Sodium, Serum: 139 (01-04 @ 10:18)  Sodium, Serum: 140 (01-03 @ 11:14)    Creatinine, Serum: 2.60 (01-05 @ 07:57)  Creatinine, Serum: 2.20 (01-04 @ 10:18)  Creatinine, Serum: 2.30 (01-03 @ 11:14)    Potassium, Serum: 3.7 (01-05 @ 07:57)  Potassium, Serum: 3.8 (01-04 @ 10:18)  Potassium, Serum: 3.3 (01-03 @ 11:14)    Hemoglobin: 7.4 (01-05 @ 07:57)  Hemoglobin: 8.5 (01-04 @ 10:18)  Hemoglobin: 9.2 (01-03 @ 11:14)

## 2022-01-06 LAB
ALBUMIN SERPL ELPH-MCNC: 2.7 G/DL — LOW (ref 3.3–5)
ALP SERPL-CCNC: 62 U/L — SIGNIFICANT CHANGE UP (ref 40–120)
ALT FLD-CCNC: 28 U/L — SIGNIFICANT CHANGE UP (ref 12–78)
ANION GAP SERPL CALC-SCNC: 10 MMOL/L — SIGNIFICANT CHANGE UP (ref 5–17)
APPEARANCE UR: CLEAR — SIGNIFICANT CHANGE UP
AST SERPL-CCNC: 18 U/L — SIGNIFICANT CHANGE UP (ref 15–37)
BASOPHILS # BLD AUTO: 0.01 K/UL — SIGNIFICANT CHANGE UP (ref 0–0.2)
BASOPHILS NFR BLD AUTO: 0.2 % — SIGNIFICANT CHANGE UP (ref 0–2)
BILIRUB SERPL-MCNC: 0.2 MG/DL — SIGNIFICANT CHANGE UP (ref 0.2–1.2)
BILIRUB UR-MCNC: NEGATIVE — SIGNIFICANT CHANGE UP
BUN SERPL-MCNC: 46 MG/DL — HIGH (ref 7–23)
CALCIUM SERPL-MCNC: 8.5 MG/DL — SIGNIFICANT CHANGE UP (ref 8.5–10.1)
CHLORIDE SERPL-SCNC: 105 MMOL/L — SIGNIFICANT CHANGE UP (ref 96–108)
CO2 SERPL-SCNC: 23 MMOL/L — SIGNIFICANT CHANGE UP (ref 22–31)
COLOR SPEC: YELLOW — SIGNIFICANT CHANGE UP
CREAT SERPL-MCNC: 2.5 MG/DL — HIGH (ref 0.5–1.3)
DIFF PNL FLD: NEGATIVE — SIGNIFICANT CHANGE UP
EOSINOPHIL # BLD AUTO: 0.04 K/UL — SIGNIFICANT CHANGE UP (ref 0–0.5)
EOSINOPHIL NFR BLD AUTO: 0.7 % — SIGNIFICANT CHANGE UP (ref 0–6)
FOLATE SERPL-MCNC: >20 NG/ML — SIGNIFICANT CHANGE UP
GLUCOSE SERPL-MCNC: 116 MG/DL — HIGH (ref 70–99)
GLUCOSE UR QL: NEGATIVE — SIGNIFICANT CHANGE UP
HCT VFR BLD CALC: 25.7 % — LOW (ref 39–50)
HGB BLD-MCNC: 7.9 G/DL — LOW (ref 13–17)
IMM GRANULOCYTES NFR BLD AUTO: 0.5 % — SIGNIFICANT CHANGE UP (ref 0–1.5)
IRON SATN MFR SERPL: 17 UG/DL — LOW (ref 45–165)
IRON SATN MFR SERPL: 6 % — LOW (ref 16–55)
KETONES UR-MCNC: NEGATIVE — SIGNIFICANT CHANGE UP
LEUKOCYTE ESTERASE UR-ACNC: NEGATIVE — SIGNIFICANT CHANGE UP
LYMPHOCYTES # BLD AUTO: 0.41 K/UL — LOW (ref 1–3.3)
LYMPHOCYTES # BLD AUTO: 7.3 % — LOW (ref 13–44)
MAGNESIUM SERPL-MCNC: 2.4 MG/DL — SIGNIFICANT CHANGE UP (ref 1.6–2.6)
MCHC RBC-ENTMCNC: 26.6 PG — LOW (ref 27–34)
MCHC RBC-ENTMCNC: 30.7 GM/DL — LOW (ref 32–36)
MCV RBC AUTO: 86.5 FL — SIGNIFICANT CHANGE UP (ref 80–100)
MONOCYTES # BLD AUTO: 0.52 K/UL — SIGNIFICANT CHANGE UP (ref 0–0.9)
MONOCYTES NFR BLD AUTO: 9.3 % — SIGNIFICANT CHANGE UP (ref 2–14)
NEUTROPHILS # BLD AUTO: 4.59 K/UL — SIGNIFICANT CHANGE UP (ref 1.8–7.4)
NEUTROPHILS NFR BLD AUTO: 82 % — HIGH (ref 43–77)
NITRITE UR-MCNC: NEGATIVE — SIGNIFICANT CHANGE UP
NRBC # BLD: 0 /100 WBCS — SIGNIFICANT CHANGE UP (ref 0–0)
PH UR: 5 — SIGNIFICANT CHANGE UP (ref 5–8)
PHOSPHATE SERPL-MCNC: 4.1 MG/DL — SIGNIFICANT CHANGE UP (ref 2.5–4.5)
PLATELET # BLD AUTO: 200 K/UL — SIGNIFICANT CHANGE UP (ref 150–400)
POTASSIUM SERPL-MCNC: 3.5 MMOL/L — SIGNIFICANT CHANGE UP (ref 3.5–5.3)
POTASSIUM SERPL-SCNC: 3.5 MMOL/L — SIGNIFICANT CHANGE UP (ref 3.5–5.3)
PROT SERPL-MCNC: 6.4 G/DL — SIGNIFICANT CHANGE UP (ref 6–8.3)
PROT UR-MCNC: 100
RBC # BLD: 2.97 M/UL — LOW (ref 4.2–5.8)
RBC # FLD: 16.9 % — HIGH (ref 10.3–14.5)
SODIUM SERPL-SCNC: 138 MMOL/L — SIGNIFICANT CHANGE UP (ref 135–145)
SP GR SPEC: 1.02 — SIGNIFICANT CHANGE UP (ref 1.01–1.02)
TIBC SERPL-MCNC: 265 UG/DL — SIGNIFICANT CHANGE UP (ref 220–430)
UIBC SERPL-MCNC: 248 UG/DL — SIGNIFICANT CHANGE UP (ref 110–370)
UROBILINOGEN FLD QL: NEGATIVE — SIGNIFICANT CHANGE UP
VIT B12 SERPL-MCNC: 1851 PG/ML — HIGH (ref 232–1245)
WBC # BLD: 5.6 K/UL — SIGNIFICANT CHANGE UP (ref 3.8–10.5)
WBC # FLD AUTO: 5.6 K/UL — SIGNIFICANT CHANGE UP (ref 3.8–10.5)

## 2022-01-06 PROCEDURE — 99232 SBSQ HOSP IP/OBS MODERATE 35: CPT

## 2022-01-06 PROCEDURE — 93306 TTE W/DOPPLER COMPLETE: CPT | Mod: 26

## 2022-01-06 PROCEDURE — 99233 SBSQ HOSP IP/OBS HIGH 50: CPT

## 2022-01-06 RX ORDER — INSULIN LISPRO 100/ML
VIAL (ML) SUBCUTANEOUS AT BEDTIME
Refills: 0 | Status: DISCONTINUED | OUTPATIENT
Start: 2022-01-06 | End: 2022-01-18

## 2022-01-06 RX ORDER — POTASSIUM CHLORIDE 20 MEQ
20 PACKET (EA) ORAL ONCE
Refills: 0 | Status: COMPLETED | OUTPATIENT
Start: 2022-01-06 | End: 2022-01-06

## 2022-01-06 RX ORDER — ISOSORBIDE MONONITRATE 60 MG/1
120 TABLET, EXTENDED RELEASE ORAL DAILY
Refills: 0 | Status: DISCONTINUED | OUTPATIENT
Start: 2022-01-06 | End: 2022-01-18

## 2022-01-06 RX ORDER — IRON SUCROSE 20 MG/ML
100 INJECTION, SOLUTION INTRAVENOUS EVERY 24 HOURS
Refills: 0 | Status: COMPLETED | OUTPATIENT
Start: 2022-01-06 | End: 2022-01-08

## 2022-01-06 RX ORDER — INSULIN LISPRO 100/ML
VIAL (ML) SUBCUTANEOUS
Refills: 0 | Status: DISCONTINUED | OUTPATIENT
Start: 2022-01-06 | End: 2022-01-10

## 2022-01-06 RX ADMIN — CARVEDILOL PHOSPHATE 6.25 MILLIGRAM(S): 80 CAPSULE, EXTENDED RELEASE ORAL at 17:58

## 2022-01-06 RX ADMIN — Medication 100 MILLIGRAM(S): at 21:52

## 2022-01-06 RX ADMIN — PANTOPRAZOLE SODIUM 40 MILLIGRAM(S): 20 TABLET, DELAYED RELEASE ORAL at 05:05

## 2022-01-06 RX ADMIN — Medication 6 MILLIGRAM(S): at 05:05

## 2022-01-06 RX ADMIN — MIRTAZAPINE 30 MILLIGRAM(S): 45 TABLET, ORALLY DISINTEGRATING ORAL at 21:53

## 2022-01-06 RX ADMIN — ARIPIPRAZOLE 30 MILLIGRAM(S): 15 TABLET ORAL at 12:11

## 2022-01-06 RX ADMIN — Medication 100 MILLIGRAM(S): at 12:13

## 2022-01-06 RX ADMIN — Medication 81 MILLIGRAM(S): at 12:12

## 2022-01-06 RX ADMIN — ATORVASTATIN CALCIUM 10 MILLIGRAM(S): 80 TABLET, FILM COATED ORAL at 21:52

## 2022-01-06 RX ADMIN — BUDESONIDE AND FORMOTEROL FUMARATE DIHYDRATE 2 PUFF(S): 160; 4.5 AEROSOL RESPIRATORY (INHALATION) at 21:53

## 2022-01-06 RX ADMIN — APIXABAN 5 MILLIGRAM(S): 2.5 TABLET, FILM COATED ORAL at 17:59

## 2022-01-06 RX ADMIN — Medication 1: at 18:03

## 2022-01-06 RX ADMIN — FAMOTIDINE 20 MILLIGRAM(S): 10 INJECTION INTRAVENOUS at 12:09

## 2022-01-06 RX ADMIN — Medication 1 MILLIGRAM(S): at 12:09

## 2022-01-06 RX ADMIN — Medication 40 MILLIGRAM(S): at 18:02

## 2022-01-06 RX ADMIN — Medication 40 MILLIGRAM(S): at 05:05

## 2022-01-06 RX ADMIN — BUDESONIDE AND FORMOTEROL FUMARATE DIHYDRATE 2 PUFF(S): 160; 4.5 AEROSOL RESPIRATORY (INHALATION) at 04:35

## 2022-01-06 RX ADMIN — AMLODIPINE BESYLATE 10 MILLIGRAM(S): 2.5 TABLET ORAL at 05:05

## 2022-01-06 RX ADMIN — ISOSORBIDE MONONITRATE 120 MILLIGRAM(S): 60 TABLET, EXTENDED RELEASE ORAL at 18:01

## 2022-01-06 RX ADMIN — Medication 0.2 MILLIGRAM(S): at 05:05

## 2022-01-06 RX ADMIN — Medication 20 MILLIEQUIVALENT(S): at 14:07

## 2022-01-06 RX ADMIN — Medication 5 MILLIGRAM(S): at 17:59

## 2022-01-06 RX ADMIN — Medication 150 MILLIGRAM(S): at 12:05

## 2022-01-06 RX ADMIN — Medication 100 MILLIGRAM(S): at 05:05

## 2022-01-06 RX ADMIN — IRON SUCROSE 100 MILLIGRAM(S): 20 INJECTION, SOLUTION INTRAVENOUS at 12:04

## 2022-01-06 RX ADMIN — REMDESIVIR 500 MILLIGRAM(S): 5 INJECTION INTRAVENOUS at 21:53

## 2022-01-06 RX ADMIN — APIXABAN 5 MILLIGRAM(S): 2.5 TABLET, FILM COATED ORAL at 05:05

## 2022-01-06 RX ADMIN — ISOSORBIDE MONONITRATE 90 MILLIGRAM(S): 60 TABLET, EXTENDED RELEASE ORAL at 12:10

## 2022-01-06 RX ADMIN — Medication 1: at 12:07

## 2022-01-06 RX ADMIN — Medication 0.2 MILLIGRAM(S): at 17:58

## 2022-01-06 RX ADMIN — Medication 5 MILLIGRAM(S): at 05:05

## 2022-01-06 RX ADMIN — CARVEDILOL PHOSPHATE 6.25 MILLIGRAM(S): 80 CAPSULE, EXTENDED RELEASE ORAL at 05:05

## 2022-01-06 RX ADMIN — LAMOTRIGINE 100 MILLIGRAM(S): 25 TABLET, ORALLY DISINTEGRATING ORAL at 12:10

## 2022-01-06 RX ADMIN — PREGABALIN 1000 MICROGRAM(S): 225 CAPSULE ORAL at 12:09

## 2022-01-06 NOTE — PROGRESS NOTE ADULT - SUBJECTIVE AND OBJECTIVE BOX
Patient is a 76y old  Male who presents with a chief complaint of CHF exacerbation (2022 12:02)    Patient seen in follow up for CKD 4.        PAST MEDICAL HISTORY:  Hypertension    Diabetes mellitus    Lupus    Hepatitis C    Anxiety and depression    CAD (coronary artery disease)    Diverticulosis    Hyperlipidemia    HTN (hypertension)    HLD (hyperlipidemia)    Atrial fibrillation    CAD (coronary artery disease)    Type 2 diabetes mellitus    Anxiety    History of diverticulitis    Diverticulosis    Afib    Stage 3 chronic kidney disease    Anemia of chronic disease    Chronic diastolic congestive heart failure    Multiple thyroid nodules        MEDICATIONS  (STANDING):  amLODIPine   Tablet 10 milliGRAM(s) Oral daily  apixaban 5 milliGRAM(s) Oral two times a day  ARIPiprazole 30 milliGRAM(s) Oral daily  aspirin enteric coated 81 milliGRAM(s) Oral daily  atorvastatin 10 milliGRAM(s) Oral at bedtime  budesonide 160 MICROgram(s)/formoterol 4.5 MICROgram(s) Inhaler 2 Puff(s) Inhalation two times a day  carvedilol 6.25 milliGRAM(s) Oral every 12 hours  cloNIDine 0.2 milliGRAM(s) Oral two times a day  cyanocobalamin 1000 MICROGram(s) Oral daily  dexAMETHasone     Tablet 6 milliGRAM(s) Oral daily  dextrose 40% Gel 15 Gram(s) Oral once  dextrose 5%. 1000 milliLiter(s) (50 mL/Hr) IV Continuous <Continuous>  dextrose 5%. 1000 milliLiter(s) (100 mL/Hr) IV Continuous <Continuous>  dextrose 50% Injectable 25 Gram(s) IV Push once  dextrose 50% Injectable 12.5 Gram(s) IV Push once  dextrose 50% Injectable 25 Gram(s) IV Push once  famotidine    Tablet 20 milliGRAM(s) Oral daily  folic acid 1 milliGRAM(s) Oral daily  furosemide   Injectable 40 milliGRAM(s) IV Push two times a day  glucagon  Injectable 1 milliGRAM(s) IntraMuscular once  hydrALAZINE 100 milliGRAM(s) Oral three times a day  insulin lispro (ADMELOG) corrective regimen sliding scale   SubCutaneous three times a day before meals  insulin lispro (ADMELOG) corrective regimen sliding scale   SubCutaneous at bedtime  iron sucrose Injectable 100 milliGRAM(s) IV Push every 24 hours  isosorbide   mononitrate ER Tablet (IMDUR) 120 milliGRAM(s) Oral daily  lamoTRIgine 100 milliGRAM(s) Oral daily  mirtazapine 30 milliGRAM(s) Oral at bedtime  oxybutynin 5 milliGRAM(s) Oral two times a day  pantoprazole    Tablet 40 milliGRAM(s) Oral before breakfast  remdesivir  IVPB   IV Intermittent   remdesivir  IVPB 100 milliGRAM(s) IV Intermittent every 24 hours  venlafaxine XR. 150 milliGRAM(s) Oral daily    MEDICATIONS  (PRN):  acetaminophen     Tablet .. 650 milliGRAM(s) Oral every 6 hours PRN Temp greater or equal to 38.5C (101.3F), Moderate Pain (4 - 6)  ALBUTerol    90 MICROgram(s) HFA Inhaler 2 Puff(s) Inhalation every 6 hours PRN Shortness of Breath and/or Wheezing    T(C): 36.5 (22 @ 12:02), Max: 38.1 (22 @ 05:30)  HR: 64 (22 @ 12:02) (58 - 77)  BP: 174/92 (22 @ 12:02) (121/68 - 186/84)  RR: 18 (22 @ 12:02)  SpO2: 93% (22 @ 12:02)  Wt(kg): --  I&O's Detail    2022 07:01  -  2022 07:00  --------------------------------------------------------  IN:  Total IN: 0 mL    OUT:    Voided (mL): 800 mL  Total OUT: 800 mL    Total NET: -800 mL              PHYSICAL EXAM:  General: No distress  Respiratory: b/l air entry  Cardiovascular: S1 S2  Gastrointestinal: soft  Extremities:  + edema                             LABORATORY:                        7.9    5.60  )-----------( 200      ( 2022 07:51 )             25.7         138  |  105  |  46<H>  ----------------------------<  116<H>  3.5   |  23  |  2.50<H>    Ca    8.5      2022 07:51  Phos  4.1       Mg     2.4         TPro  6.4  /  Alb  2.7<L>  /  TBili  0.2  /  DBili  x   /  AST  18  /  ALT  28  /  AlkPhos  62      Sodium, Serum: 138 mmol/L ( @ 07:51)  Sodium, Serum: 139 mmol/L ( @ 07:57)    Potassium, Serum: 3.5 mmol/L ( @ 07:51)  Potassium, Serum: 3.7 mmol/L ( @ 07:57)    Hemoglobin: 7.9 g/dL ( @ 07:51)  Hemoglobin: 7.5 g/dL ( @ 18:50)  Hemoglobin: 7.4 g/dL ( @ 07:57)  Hemoglobin: 8.5 g/dL ( @ 10:18)    Creatinine, Serum 2.50 ( @ 07:51)  Creatinine, Serum 2.60 ( @ 07:57)  Creatinine, Serum 2.20 ( @ 10:18)        LIVER FUNCTIONS - ( 2022 07:51 )  Alb: 2.7 g/dL / Pro: 6.4 g/dL / ALK PHOS: 62 U/L / ALT: 28 U/L / AST: 18 U/L / GGT: x           Urinalysis Basic - ( 2022 08:31 )    Color: Yellow / Appearance: Clear / S.020 / pH: x  Gluc: x / Ketone: Negative  / Bili: Negative / Urobili: Negative   Blood: x / Protein: 100 / Nitrite: Negative   Leuk Esterase: Negative / RBC: 0-2 /HPF / WBC 0-2   Sq Epi: x / Non Sq Epi: Occasional / Bacteria: Few

## 2022-01-06 NOTE — PROGRESS NOTE ADULT - ASSESSMENT
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 Found to be covid positive. Cardio consulted for elevated troponins and CHF.    Volume overloaded, CAD, HTN, HLD  - remains volume overload on exam   - BNP 4574  - continue IV Lasix BID and monitor renal indices   - strict intake and output , net neg 800 cc   - continue supplemental oxygen, wean off as tolerated   - TTE:(10/21): Normal LV size with normal LVSF with LVEF 55-60%. LV diastolic function cannot determine due to atrial fibrillation. Mild mitral regurgitation is present. Mild aortic stenosis is  present. Mild tricuspid regurgitation is present . No evidence of any pulmonary hypertension    - Troponin trended, remains elevated, likely demand ischemia in setting of above, plan for outpatient stress when underlying COVID  resolves however pt remains asymptomatic  - EKG NSR, no ischemic changes noted    - SR 60 on telemetry, no overnight events   - continue home Eliquis  - continue ASA    - BP labile 130-170's  - continue BB, CCB  - continue hydralazine  - continue Imdur would increase for better BP control     - anemia noted per primary   - given Hx of CAD transfuse PRN to keep hgb >8, give lasix post transfusion     - COVID management as per primary

## 2022-01-06 NOTE — PROGRESS NOTE ADULT - SUBJECTIVE AND OBJECTIVE BOX
Patient is a 76y old  Male who presents with a chief complaint of worsening SOB.       INTERVAL HPI/OVERNIGHT EVENTS: Pt states he feels improved with less SOB at rest, but still has VALDEZ. Pt de-escalated from BiPAP to NC and tolerating NC well. Pt denies CP, subjective fever, chills, palpitations.     MEDICATIONS  (STANDING):  amLODIPine   Tablet 10 milliGRAM(s) Oral daily  apixaban 5 milliGRAM(s) Oral two times a day  ARIPiprazole 30 milliGRAM(s) Oral daily  aspirin enteric coated 81 milliGRAM(s) Oral daily  atorvastatin 10 milliGRAM(s) Oral at bedtime  budesonide 160 MICROgram(s)/formoterol 4.5 MICROgram(s) Inhaler 2 Puff(s) Inhalation two times a day  carvedilol 6.25 milliGRAM(s) Oral every 12 hours  cloNIDine 0.2 milliGRAM(s) Oral two times a day  cyanocobalamin 1000 MICROGram(s) Oral daily  dexAMETHasone     Tablet 6 milliGRAM(s) Oral daily  dextrose 40% Gel 15 Gram(s) Oral once  dextrose 5%. 1000 milliLiter(s) (50 mL/Hr) IV Continuous <Continuous>  dextrose 5%. 1000 milliLiter(s) (100 mL/Hr) IV Continuous <Continuous>  dextrose 50% Injectable 25 Gram(s) IV Push once  dextrose 50% Injectable 12.5 Gram(s) IV Push once  dextrose 50% Injectable 25 Gram(s) IV Push once  famotidine    Tablet 20 milliGRAM(s) Oral daily  folic acid 1 milliGRAM(s) Oral daily  furosemide   Injectable 40 milliGRAM(s) IV Push two times a day  glucagon  Injectable 1 milliGRAM(s) IntraMuscular once  hydrALAZINE 100 milliGRAM(s) Oral three times a day  insulin lispro (ADMELOG) corrective regimen sliding scale   SubCutaneous three times a day before meals  insulin lispro (ADMELOG) corrective regimen sliding scale   SubCutaneous at bedtime  isosorbide   mononitrate ER Tablet (IMDUR) 90 milliGRAM(s) Oral daily  lamoTRIgine 100 milliGRAM(s) Oral daily  mirtazapine 30 milliGRAM(s) Oral at bedtime  oxybutynin 5 milliGRAM(s) Oral two times a day  pantoprazole    Tablet 40 milliGRAM(s) Oral before breakfast  remdesivir  IVPB   IV Intermittent   remdesivir  IVPB 100 milliGRAM(s) IV Intermittent every 24 hours  venlafaxine XR. 150 milliGRAM(s) Oral daily    MEDICATIONS  (PRN):  acetaminophen     Tablet .. 650 milliGRAM(s) Oral every 6 hours PRN Temp greater or equal to 38.5C (101.3F), Moderate Pain (4 - 6)  ALBUTerol    90 MICROgram(s) HFA Inhaler 2 Puff(s) Inhalation every 6 hours PRN Shortness of Breath and/or Wheezing      Allergies    No Known Allergies    Intolerances        REVIEW OF SYSTEMS:  CONSTITUTIONAL: No fever or chills  HEENT:  No headache, no sore throat  RESPIRATORY: +cough, + shortness of breath (improving) ; +VALDEZ  CARDIOVASCULAR: No chest pain, palpitations  GASTROINTESTINAL: No abd pain, nausea, vomiting, or diarrhea  GENITOURINARY: No dysuria, frequency, or hematuria  NEUROLOGICAL: no focal weakness or dizziness  MUSCULOSKELETAL: +leg swelling (improving) ; no myalgias     Vital Signs Last 24 Hrs  T(C): 36.5 (06 Jan 2022 12:02), Max: 36.9 (06 Jan 2022 04:57)  T(F): 97.7 (06 Jan 2022 12:02), Max: 98.5 (06 Jan 2022 04:57)  HR: 64 (06 Jan 2022 12:02) (58 - 77)  BP: 174/92 (06 Jan 2022 12:02) (135/72 - 182/92)  BP(mean): --  RR: 18 (06 Jan 2022 12:02) (18 - 19)  SpO2: 93% (06 Jan 2022 12:02) (90% - 93%)    PHYSICAL EXAM:  GENERAL: NAD at rest on NC  HEENT:  anicteric, moist mucous membranes  CHEST/LUNG:  decreased breath sounds b/l bases  HEART:  irregular, S1, S2  ABDOMEN:  BS+, soft, nontender, nondistended  EXTREMITIES: + LE edema b/l, no cyanosis, or calf tenderness  NERVOUS SYSTEM: answers questions and follows commands appropriately    LABS:                                   7.9    5.60  )-----------( 200      ( 06 Jan 2022 07:51 )             25.7     CBC Full  -  ( 06 Jan 2022 07:51 )  WBC Count : 5.60 K/uL  Hemoglobin : 7.9 g/dL  Hematocrit : 25.7 %  Platelet Count - Automated : 200 K/uL  Mean Cell Volume : 86.5 fl  Mean Cell Hemoglobin : 26.6 pg  Mean Cell Hemoglobin Concentration : 30.7 gm/dL  Auto Neutrophil # : 4.59 K/uL  Auto Lymphocyte # : 0.41 K/uL  Auto Monocyte # : 0.52 K/uL  Auto Eosinophil # : 0.04 K/uL  Auto Basophil # : 0.01 K/uL  Auto Neutrophil % : 82.0 %  Auto Lymphocyte % : 7.3 %  Auto Monocyte % : 9.3 %  Auto Eosinophil % : 0.7 %  Auto Basophil % : 0.2 %    01-06    138  |  105  |  46<H>  ----------------------------<  116<H>  3.5   |  23  |  2.50<H>    Ca    8.5      06 Jan 2022 07:51  Phos  4.1     01-06  Mg     2.4     01-06    TPro  6.4  /  Alb  2.7<L>  /  TBili  0.2  /  DBili  x   /  AST  18  /  ALT  28  /  AlkPhos  62  01-06            Culture - Blood (collected 01-03-22 @ 16:15)  Source: .Blood Blood-Peripheral  Preliminary Report (01-04-22 @ 17:02):    No growth to date.    Culture - Blood (collected 01-03-22 @ 16:15)  Source: .Blood Blood-Peripheral  Preliminary Report (01-04-22 @ 17:02):    No growth to date.    Culture - Urine (collected 01-03-22 @ 16:12)  Source: Clean Catch Clean Catch (Midstream)  Final Report (01-04-22 @ 13:40):    No growth        RADIOLOGY & ADDITIONAL TESTS:    Personally reviewed.     Consultant(s) Notes Reviewed:  [x] YES  [ ] NO     Patient is a 76y old  Male who presents with a chief complaint of worsening SOB.        INTERVAL HPI/OVERNIGHT EVENTS: Pt states he feels improved with less SOB at rest, but still has VALDEZ. Pt de-escalated from BiPAP to NC and tolerating NC well. Pt denies CP, subjective fever, chills, palpitations.     MEDICATIONS  (STANDING):  amLODIPine   Tablet 10 milliGRAM(s) Oral daily  apixaban 5 milliGRAM(s) Oral two times a day  ARIPiprazole 30 milliGRAM(s) Oral daily  aspirin enteric coated 81 milliGRAM(s) Oral daily  atorvastatin 10 milliGRAM(s) Oral at bedtime  budesonide 160 MICROgram(s)/formoterol 4.5 MICROgram(s) Inhaler 2 Puff(s) Inhalation two times a day  carvedilol 6.25 milliGRAM(s) Oral every 12 hours  cloNIDine 0.2 milliGRAM(s) Oral two times a day  cyanocobalamin 1000 MICROGram(s) Oral daily  dexAMETHasone     Tablet 6 milliGRAM(s) Oral daily  dextrose 40% Gel 15 Gram(s) Oral once  dextrose 5%. 1000 milliLiter(s) (50 mL/Hr) IV Continuous <Continuous>  dextrose 5%. 1000 milliLiter(s) (100 mL/Hr) IV Continuous <Continuous>  dextrose 50% Injectable 25 Gram(s) IV Push once  dextrose 50% Injectable 12.5 Gram(s) IV Push once  dextrose 50% Injectable 25 Gram(s) IV Push once  famotidine    Tablet 20 milliGRAM(s) Oral daily  folic acid 1 milliGRAM(s) Oral daily  furosemide   Injectable 40 milliGRAM(s) IV Push two times a day  glucagon  Injectable 1 milliGRAM(s) IntraMuscular once  hydrALAZINE 100 milliGRAM(s) Oral three times a day  insulin lispro (ADMELOG) corrective regimen sliding scale   SubCutaneous three times a day before meals  insulin lispro (ADMELOG) corrective regimen sliding scale   SubCutaneous at bedtime  isosorbide   mononitrate ER Tablet (IMDUR) 90 milliGRAM(s) Oral daily  lamoTRIgine 100 milliGRAM(s) Oral daily  mirtazapine 30 milliGRAM(s) Oral at bedtime  oxybutynin 5 milliGRAM(s) Oral two times a day  pantoprazole    Tablet 40 milliGRAM(s) Oral before breakfast  remdesivir  IVPB   IV Intermittent   remdesivir  IVPB 100 milliGRAM(s) IV Intermittent every 24 hours  venlafaxine XR. 150 milliGRAM(s) Oral daily    MEDICATIONS  (PRN):  acetaminophen     Tablet .. 650 milliGRAM(s) Oral every 6 hours PRN Temp greater or equal to 38.5C (101.3F), Moderate Pain (4 - 6)  ALBUTerol    90 MICROgram(s) HFA Inhaler 2 Puff(s) Inhalation every 6 hours PRN Shortness of Breath and/or Wheezing      Allergies    No Known Allergies    Intolerances        REVIEW OF SYSTEMS:  CONSTITUTIONAL: No fever or chills  HEENT:  No headache, no sore throat  RESPIRATORY: +cough, + shortness of breath (improving) ; +VALDEZ  CARDIOVASCULAR: No chest pain, palpitations  GASTROINTESTINAL: No abd pain, nausea, vomiting, or diarrhea  GENITOURINARY: No dysuria, frequency, or hematuria  NEUROLOGICAL: no focal weakness or dizziness  MUSCULOSKELETAL: +leg swelling (improving) ; no myalgias     Vital Signs Last 24 Hrs  T(C): 36.5 (06 Jan 2022 12:02), Max: 36.9 (06 Jan 2022 04:57)  T(F): 97.7 (06 Jan 2022 12:02), Max: 98.5 (06 Jan 2022 04:57)  HR: 64 (06 Jan 2022 12:02) (58 - 77)  BP: 174/92 (06 Jan 2022 12:02) (135/72 - 182/92)  BP(mean): --  RR: 18 (06 Jan 2022 12:02) (18 - 19)  SpO2: 93% (06 Jan 2022 12:02) (90% - 93%)    PHYSICAL EXAM:  GENERAL: NAD at rest on NC  HEENT:  anicteric, moist mucous membranes  CHEST/LUNG:  decreased breath sounds b/l bases  HEART:  irregular, S1, S2  ABDOMEN:  BS+, soft, nontender, nondistended  EXTREMITIES: + LE edema b/l, no cyanosis, or calf tenderness  NERVOUS SYSTEM: answers questions and follows commands appropriately    LABS:                                   7.9    5.60  )-----------( 200      ( 06 Jan 2022 07:51 )             25.7     CBC Full  -  ( 06 Jan 2022 07:51 )  WBC Count : 5.60 K/uL  Hemoglobin : 7.9 g/dL  Hematocrit : 25.7 %  Platelet Count - Automated : 200 K/uL  Mean Cell Volume : 86.5 fl  Mean Cell Hemoglobin : 26.6 pg  Mean Cell Hemoglobin Concentration : 30.7 gm/dL  Auto Neutrophil # : 4.59 K/uL  Auto Lymphocyte # : 0.41 K/uL  Auto Monocyte # : 0.52 K/uL  Auto Eosinophil # : 0.04 K/uL  Auto Basophil # : 0.01 K/uL  Auto Neutrophil % : 82.0 %  Auto Lymphocyte % : 7.3 %  Auto Monocyte % : 9.3 %  Auto Eosinophil % : 0.7 %  Auto Basophil % : 0.2 %    01-06    138  |  105  |  46<H>  ----------------------------<  116<H>  3.5   |  23  |  2.50<H>    Ca    8.5      06 Jan 2022 07:51  Phos  4.1     01-06  Mg     2.4     01-06    TPro  6.4  /  Alb  2.7<L>  /  TBili  0.2  /  DBili  x   /  AST  18  /  ALT  28  /  AlkPhos  62  01-06            Culture - Blood (collected 01-03-22 @ 16:15)  Source: .Blood Blood-Peripheral  Preliminary Report (01-04-22 @ 17:02):    No growth to date.    Culture - Blood (collected 01-03-22 @ 16:15)  Source: .Blood Blood-Peripheral  Preliminary Report (01-04-22 @ 17:02):    No growth to date.    Culture - Urine (collected 01-03-22 @ 16:12)  Source: Clean Catch Clean Catch (Midstream)  Final Report (01-04-22 @ 13:40):    No growth        RADIOLOGY & ADDITIONAL TESTS:    Personally reviewed.     Consultant(s) Notes Reviewed:  [x] YES  [ ] NO

## 2022-01-06 NOTE — PROGRESS NOTE ADULT - ASSESSMENT
77yo M from group home, with PMH of DM2, HTN, HLD, CAD (s/p stents), Afib (on Eliquis), stage 3 CKD, hx of MGUS; a/w acute hypoxic and hypercarbic respiratory failure due to COVID-19 PNA, and acute on chronic diastolic CHF.    #Acute hypoxic and hypercarbic respiratory failure due to COVID19 PNA and acute CHF:  -blood gas improving significantly   -d/w ID re remdesivir keeping in mind renal dysfunction, will continue for now, but may d/c if renal function worsening.  -continue dexamethasone 6mg daily - plan for 10-day course.  -continue suppl O2 support - on 3L currently - wean as tolerated  -airborne/contact precautions  -trending CBC diff, CMP, and  inflammatory markers  -Pulm/ID following (Barb/Don), recs appreciated    Acute on chronic diastolic CHF:  - c/w lasix 40mg IV bid; strict I/Os; monitoring hemodynamics/renal function w/diuresis  - monitor O2, daily weights   - cardio ([facility]), recs appreciated    ARIELLA on CKD 3:  - baseline Cr appears to be ~2.0 - renally dose meds, no nephrotoxics  - Cr up to 2.6, but now starting to improve while on lasix   - nephro ([facility]), recs appreciated    Anemia:  - pt with hx of MGUS and CKD likely contributing to anemia, along with current acute illness  - Hgb had trended down to 7.5, now improving  - no gross hematuria, hematochezia, melena; monitor for signs of blood loss  - discussed blood transfusion with the pt who is hesitant as he is feeling overall better  - Retacrit per nephro orders for the renal failure component of anemia   - found to be iron deficient, so started on venofer 100mg IV q24h x3 doses  - if pt agreeable to transfusion, will transfuse for goal Hgb > 8    HTN urgency:  - continue coreg, clonidine, norvasc, hydralazine, lasix  - cardio ([facility]), recs appreciated    Afib:  - c/w Eliquis  - c/w coreg  - cardio ([facility]), recs appreciated    Type 2 DM:  - goal glucose 100-180  - monitor FSG lazara as pt on decadron   - c/w HISS    psych - continue abilify, lamictal, effexor    DVT prophy   - c/w Eliquis

## 2022-01-06 NOTE — PROGRESS NOTE ADULT - SUBJECTIVE AND OBJECTIVE BOX
[facility] -- Fifi Bliss Grossman, [place], Abraham Sheridan, Kev Monterroso: Office # [phone]    Follow Up:  HLD, Afib     Subjective/Observations: Patient seen and examined, awake, alert, resting comfortably in bed, denies chest pain, dyspnea, palpitations or dizziness, orthopnea and PND. Tolerating O2 via NC     REVIEW OF SYSTEMS: All review of systems is negative for eye, ENT, GI, , allergic, dermatologic, musculoskeletal and neurologic except as described above    PAST MEDICAL & SURGICAL HISTORY:  HTN (hypertension)  c/b multiple episodes of hypertensive urgency    HLD (hyperlipidemia)    Atrial fibrillation  first documented on EKG 10/7/2021    CAD (coronary artery disease)  s/p stents (not on plavix)    Type 2 diabetes mellitus  not on home insulin/ Meds    Anxiety  Depression  multiple psych medications    History of diverticulitis  07/2021    Diverticulosis  c/b GIB in 2020    Afib  on AC    Stage 3 chronic kidney disease    Anemia of chronic disease  Monoclonal Gammopathy-MGUS  pRBC transfusion 10/15/21    Chronic diastolic congestive heart failure    Multiple thyroid nodules    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  Had surgery ( April 2013 )    S/P tonsillectomy    S/P arthroscopic knee surgery  Bilateral ( 2005 )    Torsion of testicle  Had surgery at age 14    Pilonidal cyst  Had surgery ( 1969 )    S/P cataract surgery  Bilateral    H/O hernia repair        MEDICATIONS  (STANDING):  amLODIPine   Tablet 10 milliGRAM(s) Oral daily  apixaban 5 milliGRAM(s) Oral two times a day  ARIPiprazole 30 milliGRAM(s) Oral daily  aspirin enteric coated 81 milliGRAM(s) Oral daily  atorvastatin 10 milliGRAM(s) Oral at bedtime  budesonide 160 MICROgram(s)/formoterol 4.5 MICROgram(s) Inhaler 2 Puff(s) Inhalation two times a day  carvedilol 6.25 milliGRAM(s) Oral every 12 hours  cloNIDine 0.2 milliGRAM(s) Oral two times a day  cyanocobalamin 1000 MICROGram(s) Oral daily  dexAMETHasone     Tablet 6 milliGRAM(s) Oral daily  dextrose 40% Gel 15 Gram(s) Oral once  dextrose 5%. 1000 milliLiter(s) (50 mL/Hr) IV Continuous <Continuous>  dextrose 5%. 1000 milliLiter(s) (100 mL/Hr) IV Continuous <Continuous>  dextrose 50% Injectable 25 Gram(s) IV Push once  dextrose 50% Injectable 12.5 Gram(s) IV Push once  dextrose 50% Injectable 25 Gram(s) IV Push once  famotidine    Tablet 20 milliGRAM(s) Oral daily  folic acid 1 milliGRAM(s) Oral daily  furosemide   Injectable 40 milliGRAM(s) IV Push two times a day  glucagon  Injectable 1 milliGRAM(s) IntraMuscular once  hydrALAZINE 100 milliGRAM(s) Oral three times a day  insulin lispro (ADMELOG) corrective regimen sliding scale   SubCutaneous three times a day before meals  insulin lispro (ADMELOG) corrective regimen sliding scale   SubCutaneous at bedtime  iron sucrose Injectable 100 milliGRAM(s) IV Push every 24 hours  isosorbide   mononitrate ER Tablet (IMDUR) 90 milliGRAM(s) Oral daily  lamoTRIgine 100 milliGRAM(s) Oral daily  mirtazapine 30 milliGRAM(s) Oral at bedtime  oxybutynin 5 milliGRAM(s) Oral two times a day  pantoprazole    Tablet 40 milliGRAM(s) Oral before breakfast  remdesivir  IVPB   IV Intermittent   remdesivir  IVPB 100 milliGRAM(s) IV Intermittent every 24 hours  venlafaxine XR. 150 milliGRAM(s) Oral daily    MEDICATIONS  (PRN):  acetaminophen     Tablet .. 650 milliGRAM(s) Oral every 6 hours PRN Temp greater or equal to 38.5C (101.3F), Moderate Pain (4 - 6)  ALBUTerol    90 MICROgram(s) HFA Inhaler 2 Puff(s) Inhalation every 6 hours PRN Shortness of Breath and/or Wheezing    Allergies    No Known Allergies    Intolerances      Vital Signs Last 24 Hrs  T(C): 36.5 (06 Jan 2022 12:02), Max: 36.9 (06 Jan 2022 04:57)  T(F): 97.7 (06 Jan 2022 12:02), Max: 98.5 (06 Jan 2022 04:57)  HR: 64 (06 Jan 2022 12:02) (58 - 77)  BP: 174/92 (06 Jan 2022 12:02) (135/72 - 182/92)  BP(mean): --  RR: 18 (06 Jan 2022 12:02) (18 - 19)  SpO2: 93% (06 Jan 2022 12:02) (90% - 93%)  I&O's Summary    05 Jan 2022 07:01  -  06 Jan 2022 07:00  --------------------------------------------------------  IN: 0 mL / OUT: 800 mL / NET: -800 mL          TELE:   60-90's   PHYSICAL EXAM:  Appearance: NAD, no distress, alert,  HEENT: Moist Mucous Membranes, Anicteric  Cardiovascular: Regular rate and rhythm, Normal S1 S2, No JVD, No murmurs, No rubs, gallops or clicks  Respiratory: Non-labored, Clear to auscultation, No rales, No rhonchi, No wheezing.   Gastrointestinal:  Soft, Non-tender, + BS  Neurologic: Non-focal  Skin: Warm and dry, No visible rashes or ulcers, No ecchymosis, No cyanosis  Musculoskeletal: No clubbing, No cyanosis, No joint swelling/tenderness  Psychiatry: Mood & affect appropriate  Lymph: +2 B/L peripheral edema.     LABS: All Labs Reviewed:                        7.9    5.60  )-----------( 200      ( 06 Jan 2022 07:51 )             25.7                         7.5    x     )-----------( x        ( 05 Jan 2022 18:50 )             24.0                         7.4    5.21  )-----------( 185      ( 05 Jan 2022 07:57 )             23.6     06 Jan 2022 07:51    138    |  105    |  46     ----------------------------<  116    3.5     |  23     |  2.50   05 Jan 2022 07:57    139    |  107    |  40     ----------------------------<  119    3.7     |  24     |  2.60   04 Jan 2022 10:18    139    |  107    |  34     ----------------------------<  181    3.8     |  23     |  2.20     Ca    8.5        06 Jan 2022 07:51  Ca    7.9        05 Jan 2022 07:57  Ca    8.3        04 Jan 2022 10:18  Phos  4.1       06 Jan 2022 07:51  Phos  3.5       04 Jan 2022 10:18  Mg     2.4       06 Jan 2022 07:51  Mg     2.1       04 Jan 2022 10:18    TPro  6.4    /  Alb  2.7    /  TBili  0.2    /  DBili  x      /  AST  18     /  ALT  28     /  AlkPhos  62     06 Jan 2022 07:51  TPro  5.8    /  Alb  2.5    /  TBili  0.2    /  DBili  x      /  AST  17     /  ALT  25     /  AlkPhos  54     05 Jan 2022 07:57  TPro  6.3    /  Alb  2.7    /  TBili  0.4    /  DBili  x      /  AST  17     /  ALT  27     /  AlkPhos  59     04 Jan 2022 10:18      Troponin I, High Sensitivity Result: 117.8 ng/L (01-03-22 @ 12:33)  Troponin I, High Sensitivity Result: 114.7 ng/L (01-03-22 @ 11:14)      D-Dimer Assay, Quantitative: 215 ng/mL DDU (01-03-22 @ 11:14)            12 Lead ECG:   Ventricular Rate 94 BPM    Atrial Rate 91 BPM    QRS Duration 100 ms    Q-T Interval 378 ms    QTC Calculation(Bazett) 472 ms    R Axis -15 degrees    T Axis 261 degrees    Diagnosis Line Atrial fibrillation  Incomplete right bundle branch block  Moderate voltage criteria for LVH, may be normal variant  Nonspecific T wave abnormality  Prolonged QT  Abnormal ECG  Confirmed by Bonifacio BARRERA, Victorino ([id number]) on 1/3/2022 3:17:52 PM (01-03-22 @ 10:49)    < from: Xray Chest 1 View-PORTABLE IMMEDIATE (Xray Chest 1 View-PORTABLE IMMEDIATE .) (01.05.22 @ 08:14) >    ACC: [id number] EXAM:  XR CHEST PORTABLE IMMED 1V                          PROCEDURE DATE:  01/05/2022          INTERPRETATION:  DATE OF STUDY: 1/5/22    PRIOR:12/24/21    CLINICAL INDICATION: New fever.    TECHNIQUE: portable chest.    FINDINGS:  There is stable cardiomegaly.  The left lung is clear.  Partial clearing of previously noted lower right lung airspace haziness.  New airspace opacity in mid-right lung just above right minor fissure.   New airspace opacity in upper right lung -these findings are concerning   for pneumonia right clinical setting.  No sizable pleural effusion. No pneumothorax.  Degenerative changes of the thoracic spine.    IMPRESSION:  Clear left lung.  New probable pneumonic infiltrates in mid and upper right lung.    --- End of Report ---            JOHN MARADIAGA MD; Attending Radiologist  This document has been electronically signed. Jan 5 2022  9:44AM    < end of copied text >  < from: TTE Echo Complete w/o Contrast w/ Doppler (10.13.21 @ 09:51) >     EXAM:  ECHO TTE WO CON COMP W DOPP         PROCEDURE DATE:  10/13/2021        INTERPRETATION:  Ordering Physician: SKYLA TERRY [id number]  Indication: Cardiac arrhythmias.  Technician: NIDIGO  Study Quality: Technical difficult study.  A complete echocardiographic study was performed utilizing standard protocol including spectral and color Doppler in all echocardiographic windows.  Height: 172cm  Weight: 102kg  BSA: 2.1m2  Blood Pressure: 150/90  MEASUREMENTS  IVS: 1.3cm  PWT: 1.3cm  LA: 4.2cm  AO: 3.5cm  LVIDd: 5.4 cm.  LVIDs: 3.5cm  LVEF: 55-60%.  PASP: 34mm Hg  FINDINGS  Left Ventricle: Normal in  size and normal LV systolic function with estimated LVEF 55-60%.  Right Ventricle: Normal in size and normal RV systolic function.  Left Atrium: Mild dilated.  Right Atrium: Normal in size.  Mitral Valve: Mild mitral annular calcification is present. Mitral valve is mildly calcified and no evidence of any mitral stenosis. Mild mitral regurgitation is present.  Aortic Valve: Aortic root is mildsclerotic and of normal dimension. Aortic valve is mildly calcified and mild  aortic stenosis is present with peak gradient across aortic valve is 30 mmHg. Aortic valve area not calculated.  Tricuspid Valve: Mild tricuspid regurgitation with calculated PA systolic pressure 34 mmHg is present. No evidence of any pulmonary hypertension  Pulmonic Valve: Trace Pulmonary regurgitation is present.  Diastolic Function: LV diastolic function cannot determine due to  underlying atrial fibrillation.  Pericardium/Pleura: No evidence of any pericardial effusion.  No intracardiac mass  thrombus or vegetations and  tumor.  Mild concentric left ventricular hypertrophy is present.  IMPRESSION:  Normal LV size with normal LV systolic function with estimated LVEF 55-60%.    LV diastolic function cannot determine due to atrial fibrillation.    Mild mitral regurgitation is present.    Mild aortic stenosis is  present.    Mild tricuspid regurgitation is present . No evidence of any pulmonary hypertension    Correlate clinically above findings.    --- End of Report ---              SKYLA TERRY MD; Attending Cardiologist  This document has been electronically signed. Oct 13 2021 11:55PM    < end of copied text >

## 2022-01-06 NOTE — PROGRESS NOTE ADULT - ASSESSMENT
75 yo M PMH of A fib (on Eliquis) , MGUS s/p PRBC transfusion 10/21, anxiety/depression, CAD s/p stents (not on plavix), CHF, Diverticulosis, HLD, HTN, thyroid nodules, CKD stage 3, DM presents to the ED with SOB    on o2 support  use NIPPV as tolerated  VBG repeat noted  vs noted      Previous echo 10/21 showed: Normal LV size with normal LV systolic function with estimated LVEF 55-60%. LV diastolic function cannot determine due to atrial fibrillation  Obesity - exam and risk factors - and features - c/w ELMER - outpatient eval  AF - on AC -   D dimer noted - serial D dimer  cvs rx regimen - diuresis - cxr and proBNP c/w Vol Overload - Cardio eval in progress - old records and TTE reviewed - I and O, serial CE -   dietary discretion  Covid - in vaccinated pt - monitor VS and Sat - Acap and Robitussin PRN - monitor VS and HD and Sat - Decadron is indicated in covid with hypoxemia  pt is on Home Rx regimen of Symbicort - unclear indication - ex smoker - no history of COPD as per pt or medical record - prior CT without Emphysema  isolation for covid -

## 2022-01-06 NOTE — PROGRESS NOTE ADULT - SUBJECTIVE AND OBJECTIVE BOX
Date/Time Patient Seen:  		  Referring MD:   Data Reviewed	       Patient is a 76y old  Male who presents with a chief complaint of acute hypoxic and hypercarbic respiratory failure due to CHF exacerbation and COVID-19 PNA (05 Jan 2022 21:23)      Subjective/HPI     PAST MEDICAL & SURGICAL HISTORY:  Hypertension    Diabetes mellitus    Lupus    Hepatitis C    Anxiety and depression    CAD (coronary artery disease)  s/p stents    Diverticulosis    Hyperlipidemia    HTN (hypertension)  c/b multiple episodes of hypertensive urgency    HLD (hyperlipidemia)    Atrial fibrillation  first documented on EKG 10/7/2021    CAD (coronary artery disease)  s/p stents (not on plavix)    Type 2 diabetes mellitus  not on home insulin/ Meds    Anxiety  Depression  multiple psych medications    History of diverticulitis  07/2021    Diverticulosis  c/b GIB in 2020    Afib  on AC    Stage 3 chronic kidney disease    Anemia of chronic disease  Monoclonal Gammopathy-MGUS  pRBC transfusion 10/15/21    Chronic diastolic congestive heart failure    Multiple thyroid nodules    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  Had surgery ( April 2013 )    S/P tonsillectomy    S/P arthroscopic knee surgery  Bilateral ( 2005 )    Torsion of testicle  Had surgery at age 14    Pilonidal cyst  Had surgery ( 1969 )    S/P cataract surgery  Bilateral    H/O hernia repair          Medication list         MEDICATIONS  (STANDING):  amLODIPine   Tablet 10 milliGRAM(s) Oral daily  apixaban 5 milliGRAM(s) Oral two times a day  ARIPiprazole 30 milliGRAM(s) Oral daily  aspirin enteric coated 81 milliGRAM(s) Oral daily  atorvastatin 10 milliGRAM(s) Oral at bedtime  budesonide 160 MICROgram(s)/formoterol 4.5 MICROgram(s) Inhaler 2 Puff(s) Inhalation two times a day  carvedilol 6.25 milliGRAM(s) Oral every 12 hours  cloNIDine 0.2 milliGRAM(s) Oral two times a day  cyanocobalamin 1000 MICROGram(s) Oral daily  dexAMETHasone     Tablet 6 milliGRAM(s) Oral daily  dextrose 40% Gel 15 Gram(s) Oral once  dextrose 5%. 1000 milliLiter(s) (50 mL/Hr) IV Continuous <Continuous>  dextrose 5%. 1000 milliLiter(s) (100 mL/Hr) IV Continuous <Continuous>  dextrose 50% Injectable 12.5 Gram(s) IV Push once  dextrose 50% Injectable 25 Gram(s) IV Push once  dextrose 50% Injectable 25 Gram(s) IV Push once  famotidine    Tablet 20 milliGRAM(s) Oral daily  folic acid 1 milliGRAM(s) Oral daily  furosemide   Injectable 40 milliGRAM(s) IV Push two times a day  glucagon  Injectable 1 milliGRAM(s) IntraMuscular once  hydrALAZINE 100 milliGRAM(s) Oral three times a day  insulin lispro (ADMELOG) corrective regimen sliding scale   SubCutaneous three times a day before meals  insulin lispro (ADMELOG) corrective regimen sliding scale   SubCutaneous at bedtime  isosorbide   mononitrate ER Tablet (IMDUR) 90 milliGRAM(s) Oral daily  lamoTRIgine 100 milliGRAM(s) Oral daily  mirtazapine 30 milliGRAM(s) Oral at bedtime  oxybutynin 5 milliGRAM(s) Oral two times a day  pantoprazole    Tablet 40 milliGRAM(s) Oral before breakfast  remdesivir  IVPB   IV Intermittent   remdesivir  IVPB 100 milliGRAM(s) IV Intermittent every 24 hours  venlafaxine XR. 150 milliGRAM(s) Oral daily    MEDICATIONS  (PRN):  acetaminophen     Tablet .. 650 milliGRAM(s) Oral every 6 hours PRN Temp greater or equal to 38.5C (101.3F), Moderate Pain (4 - 6)  ALBUTerol    90 MICROgram(s) HFA Inhaler 2 Puff(s) Inhalation every 6 hours PRN Shortness of Breath and/or Wheezing         Vitals log        ICU Vital Signs Last 24 Hrs  T(C): 36.9 (06 Jan 2022 04:57), Max: 36.9 (06 Jan 2022 04:57)  T(F): 98.5 (06 Jan 2022 04:57), Max: 98.5 (06 Jan 2022 04:57)  HR: 77 (06 Jan 2022 04:57) (58 - 77)  BP: 182/92 (06 Jan 2022 04:57) (135/72 - 182/92)  BP(mean): --  ABP: --  ABP(mean): --  RR: 19 (06 Jan 2022 04:57) (18 - 19)  SpO2: 90% (06 Jan 2022 04:57) (90% - 92%)           Input and Output:  I&O's Detail    04 Jan 2022 07:01  -  05 Jan 2022 07:00  --------------------------------------------------------  IN:  Total IN: 0 mL    OUT:    Voided (mL): 2050 mL  Total OUT: 2050 mL    Total NET: -2050 mL      05 Jan 2022 07:01  -  06 Jan 2022 05:36  --------------------------------------------------------  IN:  Total IN: 0 mL    OUT:    Voided (mL): 800 mL  Total OUT: 800 mL    Total NET: -800 mL          Lab Data                        7.5    x     )-----------( x        ( 05 Jan 2022 18:50 )             24.0     01-05    139  |  107  |  40<H>  ----------------------------<  119<H>  3.7   |  24  |  2.60<H>    Ca    7.9<L>      05 Jan 2022 07:57  Phos  3.5     01-04  Mg     2.1     01-04    TPro  5.8<L>  /  Alb  2.5<L>  /  TBili  0.2  /  DBili  x   /  AST  17  /  ALT  25  /  AlkPhos  54  01-05            Review of Systems	      Objective     Physical Examination    heart s1s2  lung dec BS  abd soft      Pertinent Lab findings & Imaging      Nikko:  NO   Adequate UO     I&O's Detail    04 Jan 2022 07:01  -  05 Jan 2022 07:00  --------------------------------------------------------  IN:  Total IN: 0 mL    OUT:    Voided (mL): 2050 mL  Total OUT: 2050 mL    Total NET: -2050 mL      05 Jan 2022 07:01  -  06 Jan 2022 05:36  --------------------------------------------------------  IN:  Total IN: 0 mL    OUT:    Voided (mL): 800 mL  Total OUT: 800 mL    Total NET: -800 mL               Discussed with:     Cultures:	        Radiology

## 2022-01-07 DIAGNOSIS — R04.0 EPISTAXIS: ICD-10-CM

## 2022-01-07 LAB
ALBUMIN SERPL ELPH-MCNC: 2.7 G/DL — LOW (ref 3.3–5)
ALP SERPL-CCNC: 55 U/L — SIGNIFICANT CHANGE UP (ref 40–120)
ALT FLD-CCNC: 26 U/L — SIGNIFICANT CHANGE UP (ref 12–78)
ANION GAP SERPL CALC-SCNC: 9 MMOL/L — SIGNIFICANT CHANGE UP (ref 5–17)
AST SERPL-CCNC: 18 U/L — SIGNIFICANT CHANGE UP (ref 15–37)
BASE EXCESS BLDV CALC-SCNC: -2.6 MMOL/L — LOW (ref -2–3)
BASOPHILS # BLD AUTO: 0.01 K/UL — SIGNIFICANT CHANGE UP (ref 0–0.2)
BASOPHILS NFR BLD AUTO: 0.2 % — SIGNIFICANT CHANGE UP (ref 0–2)
BILIRUB SERPL-MCNC: 0.3 MG/DL — SIGNIFICANT CHANGE UP (ref 0.2–1.2)
BLOOD GAS COMMENTS, VENOUS: SIGNIFICANT CHANGE UP
BUN SERPL-MCNC: 48 MG/DL — HIGH (ref 7–23)
CALCIUM SERPL-MCNC: 8.1 MG/DL — LOW (ref 8.5–10.1)
CHLORIDE SERPL-SCNC: 107 MMOL/L — SIGNIFICANT CHANGE UP (ref 96–108)
CO2 SERPL-SCNC: 24 MMOL/L — SIGNIFICANT CHANGE UP (ref 22–31)
CREAT SERPL-MCNC: 2.3 MG/DL — HIGH (ref 0.5–1.3)
EOSINOPHIL # BLD AUTO: 0.07 K/UL — SIGNIFICANT CHANGE UP (ref 0–0.5)
EOSINOPHIL NFR BLD AUTO: 1.2 % — SIGNIFICANT CHANGE UP (ref 0–6)
GAS PNL BLDV: SIGNIFICANT CHANGE UP
GLUCOSE SERPL-MCNC: 190 MG/DL — HIGH (ref 70–99)
HCO3 BLDV-SCNC: 22 MMOL/L — SIGNIFICANT CHANGE UP (ref 22–29)
HCT VFR BLD CALC: 26 % — LOW (ref 39–50)
HCT VFR BLD CALC: 26.4 % — LOW (ref 39–50)
HGB BLD-MCNC: 8.1 G/DL — LOW (ref 13–17)
HGB BLD-MCNC: 8.3 G/DL — LOW (ref 13–17)
IMM GRANULOCYTES NFR BLD AUTO: 0.8 % — SIGNIFICANT CHANGE UP (ref 0–1.5)
LYMPHOCYTES # BLD AUTO: 0.51 K/UL — LOW (ref 1–3.3)
LYMPHOCYTES # BLD AUTO: 8.6 % — LOW (ref 13–44)
MAGNESIUM SERPL-MCNC: 2.5 MG/DL — SIGNIFICANT CHANGE UP (ref 1.6–2.6)
MCHC RBC-ENTMCNC: 26.5 PG — LOW (ref 27–34)
MCHC RBC-ENTMCNC: 26.9 PG — LOW (ref 27–34)
MCHC RBC-ENTMCNC: 30.7 GM/DL — LOW (ref 32–36)
MCHC RBC-ENTMCNC: 31.9 GM/DL — LOW (ref 32–36)
MCV RBC AUTO: 84.4 FL — SIGNIFICANT CHANGE UP (ref 80–100)
MCV RBC AUTO: 86.3 FL — SIGNIFICANT CHANGE UP (ref 80–100)
MONOCYTES # BLD AUTO: 0.41 K/UL — SIGNIFICANT CHANGE UP (ref 0–0.9)
MONOCYTES NFR BLD AUTO: 6.9 % — SIGNIFICANT CHANGE UP (ref 2–14)
NEUTROPHILS # BLD AUTO: 4.89 K/UL — SIGNIFICANT CHANGE UP (ref 1.8–7.4)
NEUTROPHILS NFR BLD AUTO: 82.3 % — HIGH (ref 43–77)
NRBC # BLD: 0 /100 WBCS — SIGNIFICANT CHANGE UP (ref 0–0)
NRBC # BLD: 0 /100 WBCS — SIGNIFICANT CHANGE UP (ref 0–0)
PCO2 BLDV: 37 MMHG — LOW (ref 42–55)
PH BLDV: 7.39 — SIGNIFICANT CHANGE UP (ref 7.32–7.43)
PLATELET # BLD AUTO: 207 K/UL — SIGNIFICANT CHANGE UP (ref 150–400)
PLATELET # BLD AUTO: 215 K/UL — SIGNIFICANT CHANGE UP (ref 150–400)
PO2 BLDV: 105 MMHG — HIGH (ref 25–45)
POTASSIUM SERPL-MCNC: 3.4 MMOL/L — LOW (ref 3.5–5.3)
POTASSIUM SERPL-SCNC: 3.4 MMOL/L — LOW (ref 3.5–5.3)
PROCALCITONIN SERPL-MCNC: 0.2 NG/ML — HIGH
PROT SERPL-MCNC: 6.2 G/DL — SIGNIFICANT CHANGE UP (ref 6–8.3)
RBC # BLD: 3.06 M/UL — LOW (ref 4.2–5.8)
RBC # BLD: 3.08 M/UL — LOW (ref 4.2–5.8)
RBC # FLD: 16.9 % — HIGH (ref 10.3–14.5)
RBC # FLD: 17 % — HIGH (ref 10.3–14.5)
SAO2 % BLDV: 98.6 % — HIGH (ref 67–88)
SODIUM SERPL-SCNC: 140 MMOL/L — SIGNIFICANT CHANGE UP (ref 135–145)
WBC # BLD: 4.08 K/UL — SIGNIFICANT CHANGE UP (ref 3.8–10.5)
WBC # BLD: 5.94 K/UL — SIGNIFICANT CHANGE UP (ref 3.8–10.5)
WBC # FLD AUTO: 4.08 K/UL — SIGNIFICANT CHANGE UP (ref 3.8–10.5)
WBC # FLD AUTO: 5.94 K/UL — SIGNIFICANT CHANGE UP (ref 3.8–10.5)

## 2022-01-07 PROCEDURE — 99233 SBSQ HOSP IP/OBS HIGH 50: CPT

## 2022-01-07 PROCEDURE — 99222 1ST HOSP IP/OBS MODERATE 55: CPT

## 2022-01-07 PROCEDURE — 99232 SBSQ HOSP IP/OBS MODERATE 35: CPT

## 2022-01-07 RX ORDER — OXYMETAZOLINE HYDROCHLORIDE 0.5 MG/ML
1 SPRAY NASAL ONCE
Refills: 0 | Status: COMPLETED | OUTPATIENT
Start: 2022-01-07 | End: 2022-01-07

## 2022-01-07 RX ORDER — LIDOCAINE 4 G/100G
1 CREAM TOPICAL ONCE
Refills: 0 | Status: COMPLETED | OUTPATIENT
Start: 2022-01-07 | End: 2022-01-07

## 2022-01-07 RX ORDER — CEFTRIAXONE 500 MG/1
1000 INJECTION, POWDER, FOR SOLUTION INTRAMUSCULAR; INTRAVENOUS EVERY 24 HOURS
Refills: 0 | Status: COMPLETED | OUTPATIENT
Start: 2022-01-07 | End: 2022-01-11

## 2022-01-07 RX ORDER — SODIUM CHLORIDE 0.65 %
1 AEROSOL, SPRAY (ML) NASAL
Refills: 0 | Status: DISCONTINUED | OUTPATIENT
Start: 2022-01-07 | End: 2022-01-18

## 2022-01-07 RX ORDER — POTASSIUM CHLORIDE 20 MEQ
40 PACKET (EA) ORAL ONCE
Refills: 0 | Status: COMPLETED | OUTPATIENT
Start: 2022-01-07 | End: 2022-01-07

## 2022-01-07 RX ADMIN — Medication 1: at 17:05

## 2022-01-07 RX ADMIN — ISOSORBIDE MONONITRATE 120 MILLIGRAM(S): 60 TABLET, EXTENDED RELEASE ORAL at 12:24

## 2022-01-07 RX ADMIN — BUDESONIDE AND FORMOTEROL FUMARATE DIHYDRATE 2 PUFF(S): 160; 4.5 AEROSOL RESPIRATORY (INHALATION) at 06:48

## 2022-01-07 RX ADMIN — Medication 0.2 MILLIGRAM(S): at 17:07

## 2022-01-07 RX ADMIN — Medication 0.2 MILLIGRAM(S): at 06:46

## 2022-01-07 RX ADMIN — Medication 6 MILLIGRAM(S): at 06:46

## 2022-01-07 RX ADMIN — Medication 100 MILLIGRAM(S): at 06:46

## 2022-01-07 RX ADMIN — Medication 1 MILLIGRAM(S): at 12:24

## 2022-01-07 RX ADMIN — OXYMETAZOLINE HYDROCHLORIDE 1 SPRAY(S): 0.5 SPRAY NASAL at 13:17

## 2022-01-07 RX ADMIN — Medication 5 MILLIGRAM(S): at 17:07

## 2022-01-07 RX ADMIN — MIRTAZAPINE 30 MILLIGRAM(S): 45 TABLET, ORALLY DISINTEGRATING ORAL at 21:04

## 2022-01-07 RX ADMIN — Medication 1 SPRAY(S): at 12:16

## 2022-01-07 RX ADMIN — ATORVASTATIN CALCIUM 10 MILLIGRAM(S): 80 TABLET, FILM COATED ORAL at 21:04

## 2022-01-07 RX ADMIN — CEFTRIAXONE 100 MILLIGRAM(S): 500 INJECTION, POWDER, FOR SOLUTION INTRAMUSCULAR; INTRAVENOUS at 13:09

## 2022-01-07 RX ADMIN — IRON SUCROSE 100 MILLIGRAM(S): 20 INJECTION, SOLUTION INTRAVENOUS at 12:18

## 2022-01-07 RX ADMIN — PANTOPRAZOLE SODIUM 40 MILLIGRAM(S): 20 TABLET, DELAYED RELEASE ORAL at 06:46

## 2022-01-07 RX ADMIN — Medication 40 MILLIGRAM(S): at 06:46

## 2022-01-07 RX ADMIN — Medication 100 MILLIGRAM(S): at 21:04

## 2022-01-07 RX ADMIN — Medication 1 SPRAY(S): at 17:09

## 2022-01-07 RX ADMIN — ARIPIPRAZOLE 30 MILLIGRAM(S): 15 TABLET ORAL at 12:24

## 2022-01-07 RX ADMIN — PREGABALIN 1000 MICROGRAM(S): 225 CAPSULE ORAL at 12:25

## 2022-01-07 RX ADMIN — Medication 40 MILLIEQUIVALENT(S): at 12:17

## 2022-01-07 RX ADMIN — BUDESONIDE AND FORMOTEROL FUMARATE DIHYDRATE 2 PUFF(S): 160; 4.5 AEROSOL RESPIRATORY (INHALATION) at 17:08

## 2022-01-07 RX ADMIN — Medication 150 MILLIGRAM(S): at 12:23

## 2022-01-07 RX ADMIN — Medication 81 MILLIGRAM(S): at 12:24

## 2022-01-07 RX ADMIN — CARVEDILOL PHOSPHATE 6.25 MILLIGRAM(S): 80 CAPSULE, EXTENDED RELEASE ORAL at 06:46

## 2022-01-07 RX ADMIN — Medication 5 MILLIGRAM(S): at 06:46

## 2022-01-07 RX ADMIN — FAMOTIDINE 20 MILLIGRAM(S): 10 INJECTION INTRAVENOUS at 12:24

## 2022-01-07 RX ADMIN — Medication 1: at 12:19

## 2022-01-07 RX ADMIN — APIXABAN 5 MILLIGRAM(S): 2.5 TABLET, FILM COATED ORAL at 17:07

## 2022-01-07 RX ADMIN — CARVEDILOL PHOSPHATE 6.25 MILLIGRAM(S): 80 CAPSULE, EXTENDED RELEASE ORAL at 17:07

## 2022-01-07 RX ADMIN — Medication 1 SPRAY(S): at 23:25

## 2022-01-07 RX ADMIN — AMLODIPINE BESYLATE 10 MILLIGRAM(S): 2.5 TABLET ORAL at 06:46

## 2022-01-07 RX ADMIN — Medication 40 MILLIGRAM(S): at 17:08

## 2022-01-07 RX ADMIN — LAMOTRIGINE 100 MILLIGRAM(S): 25 TABLET, ORALLY DISINTEGRATING ORAL at 12:25

## 2022-01-07 RX ADMIN — Medication 100 MILLIGRAM(S): at 12:26

## 2022-01-07 RX ADMIN — REMDESIVIR 500 MILLIGRAM(S): 5 INJECTION INTRAVENOUS at 21:03

## 2022-01-07 RX ADMIN — LIDOCAINE 1 APPLICATION(S): 4 CREAM TOPICAL at 13:19

## 2022-01-07 RX ADMIN — APIXABAN 5 MILLIGRAM(S): 2.5 TABLET, FILM COATED ORAL at 06:46

## 2022-01-07 NOTE — PROGRESS NOTE ADULT - SUBJECTIVE AND OBJECTIVE BOX
Date/Time Patient Seen:  		  Referring MD:   Data Reviewed	       Patient is a 76y old  Male who presents with a chief complaint of CHF exacerbation (06 Jan 2022 17:10)      Subjective/HPI     PAST MEDICAL & SURGICAL HISTORY:  Hypertension    Diabetes mellitus    Lupus    Hepatitis C    Anxiety and depression    CAD (coronary artery disease)  s/p stents    Diverticulosis    Hyperlipidemia    HTN (hypertension)  c/b multiple episodes of hypertensive urgency    HLD (hyperlipidemia)    Atrial fibrillation  first documented on EKG 10/7/2021    CAD (coronary artery disease)  s/p stents (not on plavix)    Type 2 diabetes mellitus  not on home insulin/ Meds    Anxiety  Depression  multiple psych medications    History of diverticulitis  07/2021    Diverticulosis  c/b GIB in 2020    Afib  on AC    Stage 3 chronic kidney disease    Anemia of chronic disease  Monoclonal Gammopathy-MGUS  pRBC transfusion 10/15/21    Chronic diastolic congestive heart failure    Multiple thyroid nodules    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  Had surgery ( April 2013 )    S/P tonsillectomy    S/P arthroscopic knee surgery  Bilateral ( 2005 )    Torsion of testicle  Had surgery at age 14    Pilonidal cyst  Had surgery ( 1969 )    S/P cataract surgery  Bilateral    H/O hernia repair          Medication list         MEDICATIONS  (STANDING):  amLODIPine   Tablet 10 milliGRAM(s) Oral daily  apixaban 5 milliGRAM(s) Oral two times a day  ARIPiprazole 30 milliGRAM(s) Oral daily  aspirin enteric coated 81 milliGRAM(s) Oral daily  atorvastatin 10 milliGRAM(s) Oral at bedtime  budesonide 160 MICROgram(s)/formoterol 4.5 MICROgram(s) Inhaler 2 Puff(s) Inhalation two times a day  carvedilol 6.25 milliGRAM(s) Oral every 12 hours  cloNIDine 0.2 milliGRAM(s) Oral two times a day  cyanocobalamin 1000 MICROGram(s) Oral daily  dexAMETHasone     Tablet 6 milliGRAM(s) Oral daily  dextrose 40% Gel 15 Gram(s) Oral once  dextrose 5%. 1000 milliLiter(s) (50 mL/Hr) IV Continuous <Continuous>  dextrose 5%. 1000 milliLiter(s) (100 mL/Hr) IV Continuous <Continuous>  dextrose 50% Injectable 25 Gram(s) IV Push once  dextrose 50% Injectable 12.5 Gram(s) IV Push once  dextrose 50% Injectable 25 Gram(s) IV Push once  famotidine    Tablet 20 milliGRAM(s) Oral daily  folic acid 1 milliGRAM(s) Oral daily  furosemide   Injectable 40 milliGRAM(s) IV Push two times a day  glucagon  Injectable 1 milliGRAM(s) IntraMuscular once  hydrALAZINE 100 milliGRAM(s) Oral three times a day  insulin lispro (ADMELOG) corrective regimen sliding scale   SubCutaneous three times a day before meals  insulin lispro (ADMELOG) corrective regimen sliding scale   SubCutaneous at bedtime  iron sucrose Injectable 100 milliGRAM(s) IV Push every 24 hours  isosorbide   mononitrate ER Tablet (IMDUR) 120 milliGRAM(s) Oral daily  lamoTRIgine 100 milliGRAM(s) Oral daily  mirtazapine 30 milliGRAM(s) Oral at bedtime  oxybutynin 5 milliGRAM(s) Oral two times a day  pantoprazole    Tablet 40 milliGRAM(s) Oral before breakfast  remdesivir  IVPB   IV Intermittent   remdesivir  IVPB 100 milliGRAM(s) IV Intermittent every 24 hours  venlafaxine XR. 150 milliGRAM(s) Oral daily    MEDICATIONS  (PRN):  acetaminophen     Tablet .. 650 milliGRAM(s) Oral every 6 hours PRN Temp greater or equal to 38.5C (101.3F), Moderate Pain (4 - 6)  ALBUTerol    90 MICROgram(s) HFA Inhaler 2 Puff(s) Inhalation every 6 hours PRN Shortness of Breath and/or Wheezing         Vitals log        ICU Vital Signs Last 24 Hrs  T(C): 36.6 (07 Jan 2022 04:54), Max: 36.6 (06 Jan 2022 20:54)  T(F): 97.8 (07 Jan 2022 04:54), Max: 97.9 (06 Jan 2022 20:54)  HR: 57 (07 Jan 2022 04:54) (56 - 78)  BP: 153/74 (07 Jan 2022 04:54) (153/74 - 174/92)  BP(mean): --  ABP: --  ABP(mean): --  RR: 17 (07 Jan 2022 04:54) (17 - 18)  SpO2: 92% (07 Jan 2022 04:54) (92% - 98%)           Input and Output:  I&O's Detail    05 Jan 2022 07:01  -  06 Jan 2022 07:00  --------------------------------------------------------  IN:  Total IN: 0 mL    OUT:    Voided (mL): 800 mL  Total OUT: 800 mL    Total NET: -800 mL      06 Jan 2022 07:01  -  07 Jan 2022 05:32  --------------------------------------------------------  IN:  Total IN: 0 mL    OUT:    Voided (mL): 300 mL  Total OUT: 300 mL    Total NET: -300 mL          Lab Data                        7.9    5.60  )-----------( 200      ( 06 Jan 2022 07:51 )             25.7     01-06    138  |  105  |  46<H>  ----------------------------<  116<H>  3.5   |  23  |  2.50<H>    Ca    8.5      06 Jan 2022 07:51  Phos  4.1     01-06  Mg     2.4     01-06    TPro  6.4  /  Alb  2.7<L>  /  TBili  0.2  /  DBili  x   /  AST  18  /  ALT  28  /  AlkPhos  62  01-06            Review of Systems	      Objective     Physical Examination    heart s1s2  lung dec BS  abd soft      Pertinent Lab findings & Imaging      Nikko:  NO   Adequate UO     I&O's Detail    05 Jan 2022 07:01  -  06 Jan 2022 07:00  --------------------------------------------------------  IN:  Total IN: 0 mL    OUT:    Voided (mL): 800 mL  Total OUT: 800 mL    Total NET: -800 mL      06 Jan 2022 07:01  -  07 Jan 2022 05:32  --------------------------------------------------------  IN:  Total IN: 0 mL    OUT:    Voided (mL): 300 mL  Total OUT: 300 mL    Total NET: -300 mL               Discussed with:     Cultures:	        Radiology

## 2022-01-07 NOTE — PROGRESS NOTE ADULT - SUBJECTIVE AND OBJECTIVE BOX
Patient is a 76y old  Male who presents with a chief complaint of CHF exacerbation (2022 12:02)    Patient seen in follow up for CKD 4.        PAST MEDICAL HISTORY:  Hypertension    Diabetes mellitus    Lupus    Hepatitis C    Anxiety and depression    CAD (coronary artery disease)    Diverticulosis    Hyperlipidemia    HTN (hypertension)    HLD (hyperlipidemia)    Atrial fibrillation    CAD (coronary artery disease)    Type 2 diabetes mellitus    Anxiety    History of diverticulitis    Diverticulosis    Afib    Stage 3 chronic kidney disease    Anemia of chronic disease    Chronic diastolic congestive heart failure    Multiple thyroid nodules        MEDICATIONS  (STANDING):  amLODIPine   Tablet 10 milliGRAM(s) Oral daily  apixaban 5 milliGRAM(s) Oral two times a day  ARIPiprazole 30 milliGRAM(s) Oral daily  aspirin enteric coated 81 milliGRAM(s) Oral daily  atorvastatin 10 milliGRAM(s) Oral at bedtime  budesonide 160 MICROgram(s)/formoterol 4.5 MICROgram(s) Inhaler 2 Puff(s) Inhalation two times a day  carvedilol 6.25 milliGRAM(s) Oral every 12 hours  cefTRIAXone   IVPB 1000 milliGRAM(s) IV Intermittent every 24 hours  cloNIDine 0.2 milliGRAM(s) Oral two times a day  cyanocobalamin 1000 MICROGram(s) Oral daily  dexAMETHasone     Tablet 6 milliGRAM(s) Oral daily  dextrose 40% Gel 15 Gram(s) Oral once  dextrose 5%. 1000 milliLiter(s) (50 mL/Hr) IV Continuous <Continuous>  dextrose 5%. 1000 milliLiter(s) (100 mL/Hr) IV Continuous <Continuous>  dextrose 50% Injectable 25 Gram(s) IV Push once  dextrose 50% Injectable 12.5 Gram(s) IV Push once  dextrose 50% Injectable 25 Gram(s) IV Push once  famotidine    Tablet 20 milliGRAM(s) Oral daily  folic acid 1 milliGRAM(s) Oral daily  furosemide   Injectable 40 milliGRAM(s) IV Push two times a day  glucagon  Injectable 1 milliGRAM(s) IntraMuscular once  hydrALAZINE 100 milliGRAM(s) Oral three times a day  insulin lispro (ADMELOG) corrective regimen sliding scale   SubCutaneous three times a day before meals  insulin lispro (ADMELOG) corrective regimen sliding scale   SubCutaneous at bedtime  iron sucrose Injectable 100 milliGRAM(s) IV Push every 24 hours  isosorbide   mononitrate ER Tablet (IMDUR) 120 milliGRAM(s) Oral daily  lamoTRIgine 100 milliGRAM(s) Oral daily  mirtazapine 30 milliGRAM(s) Oral at bedtime  oxybutynin 5 milliGRAM(s) Oral two times a day  pantoprazole    Tablet 40 milliGRAM(s) Oral before breakfast  remdesivir  IVPB   IV Intermittent   remdesivir  IVPB 100 milliGRAM(s) IV Intermittent every 24 hours  sodium chloride 0.65% Nasal 1 Spray(s) Both Nostrils four times a day  venlafaxine XR. 150 milliGRAM(s) Oral daily    MEDICATIONS  (PRN):  acetaminophen     Tablet .. 650 milliGRAM(s) Oral every 6 hours PRN Temp greater or equal to 38.5C (101.3F), Moderate Pain (4 - 6)  ALBUTerol    90 MICROgram(s) HFA Inhaler 2 Puff(s) Inhalation every 6 hours PRN Shortness of Breath and/or Wheezing    T(C): 36.6 (22 @ 12:05), Max: 36.9 (22 @ 04:57)  HR: 56 (22 @ 12:05) (56 - 78)  BP: 168/83 (22 @ 12:05) (135/72 - 182/92)  RR: 18 (22 @ 12:05)  SpO2: 93% (22 @ 12:05)  Wt(kg): --  I&O's Detail    2022 07:01  -  2022 07:00  --------------------------------------------------------  IN:  Total IN: 0 mL    OUT:    Voided (mL): 851 mL  Total OUT: 851 mL    Total NET: -851 mL      2022 07:01  -  2022 15:43  --------------------------------------------------------  IN:  Total IN: 0 mL    OUT:    Voided (mL): 1000 mL  Total OUT: 1000 mL    Total NET: -1000 mL                  PHYSICAL EXAM:  General: No distress  Respiratory: b/l air entry  Cardiovascular: S1 S2  Gastrointestinal: soft  Extremities:  + edema                           LABORATORY:                        8.1    5.94  )-----------( 207      ( 2022 09:46 )             26.4         140  |  107  |  48<H>  ----------------------------<  190<H>  3.4<L>   |  24  |  2.30<H>    Ca    8.1<L>      2022 09:46  Phos  4.1       Mg     2.5         TPro  6.2  /  Alb  2.7<L>  /  TBili  0.3  /  DBili  x   /  AST  18  /  ALT  26  /  AlkPhos  55      Sodium, Serum: 140 mmol/L ( @ 09:46)  Sodium, Serum: 138 mmol/L ( @ 07:51)    Potassium, Serum: 3.4 mmol/L ( @ 09:46)  Potassium, Serum: 3.5 mmol/L ( @ 07:51)    Hemoglobin: 8.1 g/dL ( @ 09:46)  Hemoglobin: 7.9 g/dL ( @ 07:51)  Hemoglobin: 7.5 g/dL ( @ 18:50)  Hemoglobin: 7.4 g/dL ( @ 07:57)    Creatinine, Serum 2.30 ( @ 09:46)  Creatinine, Serum 2.50 ( @ 07:51)  Creatinine, Serum 2.60 ( @ 07:57)        LIVER FUNCTIONS - ( 2022 09:46 )  Alb: 2.7 g/dL / Pro: 6.2 g/dL / ALK PHOS: 55 U/L / ALT: 26 U/L / AST: 18 U/L / GGT: x           Urinalysis Basic - ( 2022 08:31 )    Color: Yellow / Appearance: Clear / S.020 / pH: x  Gluc: x / Ketone: Negative  / Bili: Negative / Urobili: Negative   Blood: x / Protein: 100 / Nitrite: Negative   Leuk Esterase: Negative / RBC: 0-2 /HPF / WBC 0-2   Sq Epi: x / Non Sq Epi: Occasional / Bacteria: Few

## 2022-01-07 NOTE — PROGRESS NOTE ADULT - ASSESSMENT
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 Found to be covid positive. Cardio consulted for elevated troponins and CHF.    Volume overloaded, CAD, HTN, HLD  - remains volume overload on exam   - BNP 4574  - continue IV Lasix BID and monitor renal indices   - strict intake and output , net neg 850 cc   - continue supplemental oxygen, wean off as tolerated   - TTE:(10/21): Normal LV size with normal LVSF with LVEF 55-60%. LV diastolic function cannot determine due to atrial fibrillation. Mild mitral regurgitation is present. Mild aortic stenosis is  present. Mild tricuspid regurgitation is present . No evidence of any pulmonary hypertension    - Troponin trended, remains elevated, likely demand ischemia in setting of above, plan for outpatient stress when underlying COVID  resolves however pt remains asymptomatic  - EKG NSR, no ischemic changes noted    - SR/SB 50- 80 on telemetry, no overnight events, would dc tele if SPO2 monitoring is no longer indicated    - continue home Eliquis  - continue ASA    - BP uncontrolled 150-160's   - continue BB, CCB  - continue hydralazine  - continue Imdur     - anemia noted per primary   - given Hx of CAD transfuse PRN to keep hgb >8, give lasix post transfusion     - COVID management as per primary     - Monitor and replete Lytes. Keep K > 4 and Mg > 2  - Will continue to follow.    CAROLINA Shah  Nurse Practitioner - Cardiology   Spectra #[id number]/ [phone]

## 2022-01-07 NOTE — PROGRESS NOTE ADULT - ASSESSMENT
75 yo M PMH of A fib, MGUS s/p PRBC transfusion 10/21, anxiety/depression, CAD s/p stents (not on plavix), CHF, Diverticulosis, HLD, HTN, thyroid nodules, CKD stage 3, DM, who presented with SOB and bilateral LE edema. Patient volume overloaded but also found to be COVID positive.    Repeat CXR read as new probable pneumonic infiltrates in R lung. Unclear if underlying bacterial pneumonia, as patient appears stable from respiratory standpoint and fevers resolved. He has no leukocytosis as well. However can start course of antibiotics given patient higher risk. Lower clinical suspicion for MDR/pseudomonas pneumonia as patient appears stable.    -can start ceftriaxone 1g IV q24h--complete 7 day course  -continue remdesivir (day 4/5), dexamethasone to complete 10 days  -recommend repeat procalcitonin, MRSA nasal PCR, urine legionella and pneumococcal antigens    Cheyenne Ochoa MD  Division of Infectious Diseases   Cell [phone] between 8am and 6pm   After 6pm and weekends please call ID service at [phone].

## 2022-01-07 NOTE — CONSULT NOTE ADULT - SUBJECTIVE AND OBJECTIVE BOX
Patient admitted with CHF, renal failure, mental status changes, covid with acute onset of epistaxis today.  Active bleeding from right side. Was controlled with cotton and afrin and pressure.  no further bleeding since this am.  on exam pt cooperative but not very responsive.  Not answering questions.  On exam neck negative. oral nl. no bleeding.  nose dry blood left nares.  right side vascular blush but no actively bleeding.  mild deviation of septum.  sinuses nontender

## 2022-01-07 NOTE — PROGRESS NOTE ADULT - SUBJECTIVE AND OBJECTIVE BOX
[facility]  INFECTIOUS DISEASES - [address], [place] [address]  Tel: [phone]     Fax: [phone]  =======================================================    TRIPP ZARATE [id number]    Follow up: I was asked to re-evaluate patient for repeat CXR findings. He has no fevers. On 3L nasal cannula. Denies any SOB but occasional cough. Now with epistaxis.    Allergies:  No Known Allergies      Antibiotics:  acetaminophen     Tablet .. 650 milliGRAM(s) Oral every 6 hours PRN  ALBUTerol    90 MICROgram(s) HFA Inhaler 2 Puff(s) Inhalation every 6 hours PRN  amLODIPine   Tablet 10 milliGRAM(s) Oral daily  apixaban 5 milliGRAM(s) Oral two times a day  ARIPiprazole 30 milliGRAM(s) Oral daily  aspirin enteric coated 81 milliGRAM(s) Oral daily  atorvastatin 10 milliGRAM(s) Oral at bedtime  budesonide 160 MICROgram(s)/formoterol 4.5 MICROgram(s) Inhaler 2 Puff(s) Inhalation two times a day  carvedilol 6.25 milliGRAM(s) Oral every 12 hours  cefTRIAXone   IVPB 1000 milliGRAM(s) IV Intermittent every 24 hours  cloNIDine 0.2 milliGRAM(s) Oral two times a day  cyanocobalamin 1000 MICROGram(s) Oral daily  dexAMETHasone     Tablet 6 milliGRAM(s) Oral daily  dextrose 40% Gel 15 Gram(s) Oral once  dextrose 5%. 1000 milliLiter(s) IV Continuous <Continuous>  dextrose 5%. 1000 milliLiter(s) IV Continuous <Continuous>  dextrose 50% Injectable 25 Gram(s) IV Push once  dextrose 50% Injectable 12.5 Gram(s) IV Push once  dextrose 50% Injectable 25 Gram(s) IV Push once  famotidine    Tablet 20 milliGRAM(s) Oral daily  folic acid 1 milliGRAM(s) Oral daily  furosemide   Injectable 40 milliGRAM(s) IV Push two times a day  glucagon  Injectable 1 milliGRAM(s) IntraMuscular once  hydrALAZINE 100 milliGRAM(s) Oral three times a day  insulin lispro (ADMELOG) corrective regimen sliding scale   SubCutaneous three times a day before meals  insulin lispro (ADMELOG) corrective regimen sliding scale   SubCutaneous at bedtime  iron sucrose Injectable 100 milliGRAM(s) IV Push every 24 hours  isosorbide   mononitrate ER Tablet (IMDUR) 120 milliGRAM(s) Oral daily  lamoTRIgine 100 milliGRAM(s) Oral daily  lidocaine 4% Topical Solution 1 Application(s) Topical once  mirtazapine 30 milliGRAM(s) Oral at bedtime  oxybutynin 5 milliGRAM(s) Oral two times a day  oxymetazoline 0.05% Nasal Spray 1 Spray(s) Right Nostril once  pantoprazole    Tablet 40 milliGRAM(s) Oral before breakfast  remdesivir  IVPB   IV Intermittent   remdesivir  IVPB 100 milliGRAM(s) IV Intermittent every 24 hours  sodium chloride 0.65% Nasal 1 Spray(s) Both Nostrils four times a day  venlafaxine XR. 150 milliGRAM(s) Oral daily       REVIEW OF SYSTEMS:  CONSTITUTIONAL: No Fevers   CARDIOVASCULAR:  No chest pain   RESPIRATORY:  see history  GASTROINTESTINAL:  No abdominal pain. no nausea or vomiting  GENITOURINARY:  No dysuria  NEUROLOGIC:  No headache, no dizziness     Physical Exam:  ICU Vital Signs Last 24 Hrs  T(C): 36.6 (2022 12:05), Max: 36.6 (2022 20:54)  T(F): 97.8 (2022 12:05), Max: 97.9 (2022 20:54)  HR: 56 (2022 12:05) (56 - 78)  BP: 168/83 (2022 12:05) (153/74 - 173/79)  BP(mean): --  ABP: --  ABP(mean): --  RR: 18 (2022 12:05) (17 - 18)  SpO2: 93% (2022 12:05) (92% - 98%)     GEN: NAD  HEENT: normocephalic and atraumatic.  NECK: Supple.   LUNGS: Normal respiratory effort  HEART: Regular rate and rhythm  ABDOMEN: Soft, nontender  EXTREMITIES: bipedal edema, no erythema      Labs:      140  |  107  |  48<H>  ----------------------------<  190<H>  3.4<L>   |  24  |  2.30<H>    Ca    8.1<L>      2022 09:46  Phos  4.1       Mg     2.5         TPro  6.2  /  Alb  2.7<L>  /  TBili  0.3  /  DBili  x   /  AST  18  /  ALT  26  /  AlkPhos  55                            8.1    5.94  )-----------( 207      ( 2022 09:46 )             26.4       Urinalysis Basic - ( 2022 08:31 )    Color: Yellow / Appearance: Clear / S.020 / pH: x  Gluc: x / Ketone: Negative  / Bili: Negative / Urobili: Negative   Blood: x / Protein: 100 / Nitrite: Negative   Leuk Esterase: Negative / RBC: 0-2 /HPF / WBC 0-2   Sq Epi: x / Non Sq Epi: Occasional / Bacteria: Few      LIVER FUNCTIONS - ( 2022 09:46 )  Alb: 2.7 g/dL / Pro: 6.2 g/dL / ALK PHOS: 55 U/L / ALT: 26 U/L / AST: 18 U/L / GGT: x             RECENT CULTURES:   @ 12:15 .Blood Blood-Peripheral     No growth to date.         @ 16:15 .Blood Blood-Peripheral     No growth to date.         @ 16:12 Clean Catch Clean Catch (Midstream)     No growth              All imaging and data are reviewed.     FINDINGS:  There is stable cardiomegaly.  The left lung is clear.  Partial clearing of previously noted lower right lung airspace haziness.  New airspace opacity in mid-right lung just above right minor fissure.   New airspace opacity in upper right lung -these findings are concerning   for pneumonia right clinical setting.  No sizable pleural effusion. No pneumothorax.  Degenerative changes of the thoracic spine.    IMPRESSION:  Clear left lung.  New probable pneumonic infiltrates in mid and upper right lung.

## 2022-01-07 NOTE — PROGRESS NOTE ADULT - SUBJECTIVE AND OBJECTIVE BOX
Patient is a 76y old  Male who presents with a chief complaint of worsening SOB.        INTERVAL HPI/OVERNIGHT EVENTS: Pt states he feels improved with less SOB at rest, but still has VALDEZ. Today, pt with significant epistaxis from right nares. Holding pressure was not successful. Called ENT who rec afrin/lidocaine soaked cotton with pressure. Trialed that strategy and it worked well with resolution of bleeding. Pt on NC and tolerating NC well. Pt denies CP, fever, chills, palpitations.     MEDICATIONS  (STANDING):  amLODIPine   Tablet 10 milliGRAM(s) Oral daily  apixaban 5 milliGRAM(s) Oral two times a day  ARIPiprazole 30 milliGRAM(s) Oral daily  aspirin enteric coated 81 milliGRAM(s) Oral daily  atorvastatin 10 milliGRAM(s) Oral at bedtime  budesonide 160 MICROgram(s)/formoterol 4.5 MICROgram(s) Inhaler 2 Puff(s) Inhalation two times a day  carvedilol 6.25 milliGRAM(s) Oral every 12 hours  cloNIDine 0.2 milliGRAM(s) Oral two times a day  cyanocobalamin 1000 MICROGram(s) Oral daily  dexAMETHasone     Tablet 6 milliGRAM(s) Oral daily  dextrose 40% Gel 15 Gram(s) Oral once  dextrose 5%. 1000 milliLiter(s) (50 mL/Hr) IV Continuous <Continuous>  dextrose 5%. 1000 milliLiter(s) (100 mL/Hr) IV Continuous <Continuous>  dextrose 50% Injectable 25 Gram(s) IV Push once  dextrose 50% Injectable 12.5 Gram(s) IV Push once  dextrose 50% Injectable 25 Gram(s) IV Push once  famotidine    Tablet 20 milliGRAM(s) Oral daily  folic acid 1 milliGRAM(s) Oral daily  furosemide   Injectable 40 milliGRAM(s) IV Push two times a day  glucagon  Injectable 1 milliGRAM(s) IntraMuscular once  hydrALAZINE 100 milliGRAM(s) Oral three times a day  insulin lispro (ADMELOG) corrective regimen sliding scale   SubCutaneous three times a day before meals  insulin lispro (ADMELOG) corrective regimen sliding scale   SubCutaneous at bedtime  isosorbide   mononitrate ER Tablet (IMDUR) 90 milliGRAM(s) Oral daily  lamoTRIgine 100 milliGRAM(s) Oral daily  mirtazapine 30 milliGRAM(s) Oral at bedtime  oxybutynin 5 milliGRAM(s) Oral two times a day  pantoprazole    Tablet 40 milliGRAM(s) Oral before breakfast  remdesivir  IVPB   IV Intermittent   remdesivir  IVPB 100 milliGRAM(s) IV Intermittent every 24 hours  venlafaxine XR. 150 milliGRAM(s) Oral daily    MEDICATIONS  (PRN):  acetaminophen     Tablet .. 650 milliGRAM(s) Oral every 6 hours PRN Temp greater or equal to 38.5C (101.3F), Moderate Pain (4 - 6)  ALBUTerol    90 MICROgram(s) HFA Inhaler 2 Puff(s) Inhalation every 6 hours PRN Shortness of Breath and/or Wheezing      Allergies    No Known Allergies    Intolerances        REVIEW OF SYSTEMS:  CONSTITUTIONAL: No fever or chills  HEENT:  No headache, no sore throat  RESPIRATORY: +cough, shortness of breath (improving) ; +VALDEZ  CARDIOVASCULAR: No chest pain, palpitations  GASTROINTESTINAL: No abd pain, nausea, vomiting, or diarrhea  GENITOURINARY: No dysuria, frequency, or hematuria  NEUROLOGICAL: no focal weakness or dizziness  MUSCULOSKELETAL: +leg swelling (improving) ; no myalgias     Vital Signs Last 24 Hrs  T(C): 36.6 (07 Jan 2022 12:05), Max: 36.6 (06 Jan 2022 20:54)  T(F): 97.8 (07 Jan 2022 12:05), Max: 97.9 (06 Jan 2022 20:54)  HR: 56 (07 Jan 2022 12:05) (56 - 78)  BP: 168/83 (07 Jan 2022 12:05) (153/74 - 173/79)  BP(mean): --  RR: 18 (07 Jan 2022 12:05) (17 - 18)  SpO2: 93% (07 Jan 2022 12:05) (92% - 98%)    PHYSICAL EXAM:  GENERAL: NAD at rest on NC  HEENT:  anicteric, moist mucous membranes  CHEST/LUNG:  decreased breath sounds b/l bases  HEART:  irregular, S1, S2  ABDOMEN:  BS+, soft, nontender, nondistended  EXTREMITIES: + LE edema b/l, no cyanosis, or calf tenderness  NERVOUS SYSTEM: answers questions and follows commands appropriately    LABS:                                   8.1    5.94  )-----------( 207      ( 07 Jan 2022 09:46 )             26.4     01-07    140  |  107  |  48<H>  ----------------------------<  190<H>  3.4<L>   |  24  |  2.30<H>    Ca    8.1<L>      07 Jan 2022 09:46  Phos  4.1     01-06  Mg     2.5     01-07    TPro  6.2  /  Alb  2.7<L>  /  TBili  0.3  /  DBili  x   /  AST  18  /  ALT  26  /  AlkPhos  55  01-07            Culture - Blood (collected 01-03-22 @ 16:15)  Source: .Blood Blood-Peripheral  Preliminary Report (01-04-22 @ 17:02):    No growth to date.    Culture - Blood (collected 01-03-22 @ 16:15)  Source: .Blood Blood-Peripheral  Preliminary Report (01-04-22 @ 17:02):    No growth to date.    Culture - Urine (collected 01-03-22 @ 16:12)  Source: Clean Catch Clean Catch (Midstream)  Final Report (01-04-22 @ 13:40):    No growth        RADIOLOGY & ADDITIONAL TESTS:    Personally reviewed.     Consultant(s) Notes Reviewed:  [x] YES  [ ] NO     Patient is a 76y old  Male who presents with a chief complaint of worsening SOB.         INTERVAL HPI/OVERNIGHT EVENTS: Pt states he feels improved with less SOB at rest, but still has VALDEZ. Today, pt with significant epistaxis from right nares. Holding pressure was not successful. Called ENT who rec afrin/lidocaine soaked cotton with pressure. Trialed that strategy and it worked well with resolution of bleeding. Pt on NC and tolerating NC well. Pt denies CP, fever, chills, palpitations.     MEDICATIONS  (STANDING):  amLODIPine   Tablet 10 milliGRAM(s) Oral daily  apixaban 5 milliGRAM(s) Oral two times a day  ARIPiprazole 30 milliGRAM(s) Oral daily  aspirin enteric coated 81 milliGRAM(s) Oral daily  atorvastatin 10 milliGRAM(s) Oral at bedtime  budesonide 160 MICROgram(s)/formoterol 4.5 MICROgram(s) Inhaler 2 Puff(s) Inhalation two times a day  carvedilol 6.25 milliGRAM(s) Oral every 12 hours  cloNIDine 0.2 milliGRAM(s) Oral two times a day  cyanocobalamin 1000 MICROGram(s) Oral daily  dexAMETHasone     Tablet 6 milliGRAM(s) Oral daily  dextrose 40% Gel 15 Gram(s) Oral once  dextrose 5%. 1000 milliLiter(s) (50 mL/Hr) IV Continuous <Continuous>  dextrose 5%. 1000 milliLiter(s) (100 mL/Hr) IV Continuous <Continuous>  dextrose 50% Injectable 25 Gram(s) IV Push once  dextrose 50% Injectable 12.5 Gram(s) IV Push once  dextrose 50% Injectable 25 Gram(s) IV Push once  famotidine    Tablet 20 milliGRAM(s) Oral daily  folic acid 1 milliGRAM(s) Oral daily  furosemide   Injectable 40 milliGRAM(s) IV Push two times a day  glucagon  Injectable 1 milliGRAM(s) IntraMuscular once  hydrALAZINE 100 milliGRAM(s) Oral three times a day  insulin lispro (ADMELOG) corrective regimen sliding scale   SubCutaneous three times a day before meals  insulin lispro (ADMELOG) corrective regimen sliding scale   SubCutaneous at bedtime  isosorbide   mononitrate ER Tablet (IMDUR) 90 milliGRAM(s) Oral daily  lamoTRIgine 100 milliGRAM(s) Oral daily  mirtazapine 30 milliGRAM(s) Oral at bedtime  oxybutynin 5 milliGRAM(s) Oral two times a day  pantoprazole    Tablet 40 milliGRAM(s) Oral before breakfast  remdesivir  IVPB   IV Intermittent   remdesivir  IVPB 100 milliGRAM(s) IV Intermittent every 24 hours  venlafaxine XR. 150 milliGRAM(s) Oral daily    MEDICATIONS  (PRN):  acetaminophen     Tablet .. 650 milliGRAM(s) Oral every 6 hours PRN Temp greater or equal to 38.5C (101.3F), Moderate Pain (4 - 6)  ALBUTerol    90 MICROgram(s) HFA Inhaler 2 Puff(s) Inhalation every 6 hours PRN Shortness of Breath and/or Wheezing      Allergies    No Known Allergies    Intolerances        REVIEW OF SYSTEMS:  CONSTITUTIONAL: No fever or chills  HEENT:  No headache, no sore throat  RESPIRATORY: +cough, shortness of breath (improving) ; +VALDEZ  CARDIOVASCULAR: No chest pain, palpitations  GASTROINTESTINAL: No abd pain, nausea, vomiting, or diarrhea  GENITOURINARY: No dysuria, frequency, or hematuria  NEUROLOGICAL: no focal weakness or dizziness  MUSCULOSKELETAL: +leg swelling (improving) ; no myalgias     Vital Signs Last 24 Hrs  T(C): 36.6 (07 Jan 2022 12:05), Max: 36.6 (06 Jan 2022 20:54)  T(F): 97.8 (07 Jan 2022 12:05), Max: 97.9 (06 Jan 2022 20:54)  HR: 56 (07 Jan 2022 12:05) (56 - 78)  BP: 168/83 (07 Jan 2022 12:05) (153/74 - 173/79)  BP(mean): --  RR: 18 (07 Jan 2022 12:05) (17 - 18)  SpO2: 93% (07 Jan 2022 12:05) (92% - 98%)    PHYSICAL EXAM:  GENERAL: NAD at rest on NC  HEENT:  anicteric, moist mucous membranes  CHEST/LUNG:  decreased breath sounds b/l bases  HEART:  irregular, S1, S2  ABDOMEN:  BS+, soft, nontender, nondistended  EXTREMITIES: + LE edema b/l, no cyanosis, or calf tenderness  NERVOUS SYSTEM: answers questions and follows commands appropriately    LABS:                                   8.1    5.94  )-----------( 207      ( 07 Jan 2022 09:46 )             26.4     01-07    140  |  107  |  48<H>  ----------------------------<  190<H>  3.4<L>   |  24  |  2.30<H>    Ca    8.1<L>      07 Jan 2022 09:46  Phos  4.1     01-06  Mg     2.5     01-07    TPro  6.2  /  Alb  2.7<L>  /  TBili  0.3  /  DBili  x   /  AST  18  /  ALT  26  /  AlkPhos  55  01-07            Culture - Blood (collected 01-03-22 @ 16:15)  Source: .Blood Blood-Peripheral  Preliminary Report (01-04-22 @ 17:02):    No growth to date.    Culture - Blood (collected 01-03-22 @ 16:15)  Source: .Blood Blood-Peripheral  Preliminary Report (01-04-22 @ 17:02):    No growth to date.    Culture - Urine (collected 01-03-22 @ 16:12)  Source: Clean Catch Clean Catch (Midstream)  Final Report (01-04-22 @ 13:40):    No growth        RADIOLOGY & ADDITIONAL TESTS:    Personally reviewed.     Consultant(s) Notes Reviewed:  [x] YES  [ ] NO

## 2022-01-07 NOTE — PROGRESS NOTE ADULT - ASSESSMENT
CKD 4  Diabetes  CHF  Hypertension  h/o SLE  Hypokalemia  Anemia  COVID +, Pneumonia    Renal indices at baseline. To continue current meds. On IV lasix. Retacrit for anemia. Monitor blood sugar levels. Insulin coverage as needed.   Monitor h/h trend. Potassium supplementation. Monitor BP trend. Titrate BP meds as needed. Salt restriction. On IV abx. ID follow up.   Will follow electrolytes and renal function trend. Avoid nephrotoxic meds as possible.

## 2022-01-07 NOTE — PROGRESS NOTE ADULT - SUBJECTIVE AND OBJECTIVE BOX
[facility] -- Fifi Bliss Grossman, [place], Abraham Sheridan Savella, Goodger: Office # [phone]    Follow Up:   HLD, Afib     Subjective/Observations: Patient seen and examined, awake, alert, sitting comfortably in the chair, denies chest pain, dyspnea, palpitations or dizziness, orthopnea and PND. Tolerating O2 via NC     REVIEW OF SYSTEMS: All review of systems is negative for eye, ENT, GI, , allergic, dermatologic, musculoskeletal and neurologic except as described above    PAST MEDICAL & SURGICAL HISTORY:  HTN (hypertension)  c/b multiple episodes of hypertensive urgency    HLD (hyperlipidemia)    Atrial fibrillation  first documented on EKG 10/7/2021    CAD (coronary artery disease)  s/p stents (not on plavix)    Type 2 diabetes mellitus  not on home insulin/ Meds    Anxiety  Depression  multiple psych medications    History of diverticulitis  07/2021    Diverticulosis  c/b GIB in 2020    Afib  on AC    Stage 3 chronic kidney disease    Anemia of chronic disease  Monoclonal Gammopathy-MGUS  pRBC transfusion 10/15/21    Chronic diastolic congestive heart failure    Multiple thyroid nodules    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  Had surgery ( April 2013 )    S/P tonsillectomy    S/P arthroscopic knee surgery  Bilateral ( 2005 )    Torsion of testicle  Had surgery at age 14    Pilonidal cyst  Had surgery ( 1969 )    S/P cataract surgery  Bilateral    H/O hernia repair        MEDICATIONS  (STANDING):  amLODIPine   Tablet 10 milliGRAM(s) Oral daily  apixaban 5 milliGRAM(s) Oral two times a day  ARIPiprazole 30 milliGRAM(s) Oral daily  aspirin enteric coated 81 milliGRAM(s) Oral daily  atorvastatin 10 milliGRAM(s) Oral at bedtime  budesonide 160 MICROgram(s)/formoterol 4.5 MICROgram(s) Inhaler 2 Puff(s) Inhalation two times a day  carvedilol 6.25 milliGRAM(s) Oral every 12 hours  cefTRIAXone   IVPB 1000 milliGRAM(s) IV Intermittent every 24 hours  cloNIDine 0.2 milliGRAM(s) Oral two times a day  cyanocobalamin 1000 MICROGram(s) Oral daily  dexAMETHasone     Tablet 6 milliGRAM(s) Oral daily  dextrose 40% Gel 15 Gram(s) Oral once  dextrose 5%. 1000 milliLiter(s) (50 mL/Hr) IV Continuous <Continuous>  dextrose 5%. 1000 milliLiter(s) (100 mL/Hr) IV Continuous <Continuous>  dextrose 50% Injectable 25 Gram(s) IV Push once  dextrose 50% Injectable 12.5 Gram(s) IV Push once  dextrose 50% Injectable 25 Gram(s) IV Push once  famotidine    Tablet 20 milliGRAM(s) Oral daily  folic acid 1 milliGRAM(s) Oral daily  furosemide   Injectable 40 milliGRAM(s) IV Push two times a day  glucagon  Injectable 1 milliGRAM(s) IntraMuscular once  hydrALAZINE 100 milliGRAM(s) Oral three times a day  insulin lispro (ADMELOG) corrective regimen sliding scale   SubCutaneous three times a day before meals  insulin lispro (ADMELOG) corrective regimen sliding scale   SubCutaneous at bedtime  iron sucrose Injectable 100 milliGRAM(s) IV Push every 24 hours  isosorbide   mononitrate ER Tablet (IMDUR) 120 milliGRAM(s) Oral daily  lamoTRIgine 100 milliGRAM(s) Oral daily  mirtazapine 30 milliGRAM(s) Oral at bedtime  oxybutynin 5 milliGRAM(s) Oral two times a day  pantoprazole    Tablet 40 milliGRAM(s) Oral before breakfast  remdesivir  IVPB   IV Intermittent   remdesivir  IVPB 100 milliGRAM(s) IV Intermittent every 24 hours  sodium chloride 0.65% Nasal 1 Spray(s) Both Nostrils four times a day  venlafaxine XR. 150 milliGRAM(s) Oral daily    MEDICATIONS  (PRN):  acetaminophen     Tablet .. 650 milliGRAM(s) Oral every 6 hours PRN Temp greater or equal to 38.5C (101.3F), Moderate Pain (4 - 6)  ALBUTerol    90 MICROgram(s) HFA Inhaler 2 Puff(s) Inhalation every 6 hours PRN Shortness of Breath and/or Wheezing    Allergies    No Known Allergies    Intolerances      Vital Signs Last 24 Hrs  T(C): 36.6 (07 Jan 2022 12:05), Max: 36.6 (06 Jan 2022 20:54)  T(F): 97.8 (07 Jan 2022 12:05), Max: 97.9 (06 Jan 2022 20:54)  HR: 56 (07 Jan 2022 12:05) (56 - 78)  BP: 168/83 (07 Jan 2022 12:05) (153/74 - 173/79)  BP(mean): --  RR: 18 (07 Jan 2022 12:05) (17 - 18)  SpO2: 93% (07 Jan 2022 12:05) (92% - 98%)  I&O's Summary    06 Jan 2022 07:01  -  07 Jan 2022 07:00  --------------------------------------------------------  IN: 0 mL / OUT: 851 mL / NET: -851 mL    07 Jan 2022 07:01  -  07 Jan 2022 16:32  --------------------------------------------------------  IN: 0 mL / OUT: 1000 mL / NET: -1000 mL          TELE:   PHYSICAL EXAM:  Appearance: NAD, no distress, alert,  HEENT: Moist Mucous Membranes, Anicteric  Cardiovascular: Regular rate and rhythm, Normal S1 S2, No JVD, No murmurs, No rubs, gallops or clicks  Respiratory: Non-labored, Clear to auscultation, No rales, No rhonchi, No wheezing.   Gastrointestinal:  Soft, Non-tender, + BS  Neurologic: Non-focal  Skin: Warm and dry, No visible rashes or ulcers, No ecchymosis, No cyanosis  Musculoskeletal: No clubbing, No cyanosis, No joint swelling/tenderness  Psychiatry: Mood & affect appropriate  Lymph: +2 b/l  peripheral edema.     LABS: All Labs Reviewed:                        8.1    5.94  )-----------( 207      ( 07 Jan 2022 09:46 )             26.4                         7.9    5.60  )-----------( 200      ( 06 Jan 2022 07:51 )             25.7                         7.5    x     )-----------( x        ( 05 Jan 2022 18:50 )             24.0     07 Jan 2022 09:46    140    |  107    |  48     ----------------------------<  190    3.4     |  24     |  2.30   06 Jan 2022 07:51    138    |  105    |  46     ----------------------------<  116    3.5     |  23     |  2.50   05 Jan 2022 07:57    139    |  107    |  40     ----------------------------<  119    3.7     |  24     |  2.60     Ca    8.1        07 Jan 2022 09:46  Ca    8.5        06 Jan 2022 07:51  Ca    7.9        05 Jan 2022 07:57  Phos  4.1       06 Jan 2022 07:51  Mg     2.5       07 Jan 2022 09:46  Mg     2.4       06 Jan 2022 07:51    TPro  6.2    /  Alb  2.7    /  TBili  0.3    /  DBili  x      /  AST  18     /  ALT  26     /  AlkPhos  55     07 Jan 2022 09:46  TPro  6.4    /  Alb  2.7    /  TBili  0.2    /  DBili  x      /  AST  18     /  ALT  28     /  AlkPhos  62     06 Jan 2022 07:51  TPro  5.8    /  Alb  2.5    /  TBili  0.2    /  DBili  x      /  AST  17     /  ALT  25     /  AlkPhos  54     05 Jan 2022 07:57      Troponin I, High Sensitivity Result: 117.8 ng/L (01-03-22 @ 12:33)  Troponin I, High Sensitivity Result: 114.7 ng/L (01-03-22 @ 11:14)      D-Dimer Assay, Quantitative: 215 ng/mL DDU (01-03-22 @ 11:14)            12 Lead ECG:   Ventricular Rate 94 BPM    Atrial Rate 91 BPM    QRS Duration 100 ms    Q-T Interval 378 ms    QTC Calculation(Bazett) 472 ms    R Axis -15 degrees    T Axis 261 degrees    Diagnosis Line Atrial fibrillation  Incomplete right bundle branch block  Moderate voltage criteria for LVH, may be normal variant  Nonspecific T wave abnormality  Prolonged QT  Abnormal ECG  Confirmed by Bonifacio BARRERA, Victorino ([id number]) on 1/3/2022 3:17:52 PM (01-03-22 @ 10:49)    < from: Xray Chest 1 View-PORTABLE IMMEDIATE (Xray Chest 1 View-PORTABLE IMMEDIATE .) (01.05.22 @ 08:14) >    ACC: [id number] EXAM:  XR CHEST PORTABLE IMMED 1V                          PROCEDURE DATE:  01/05/2022          INTERPRETATION:  DATE OF STUDY: 1/5/22    PRIOR:12/24/21    CLINICAL INDICATION: New fever.    TECHNIQUE: portable chest.    FINDINGS:  There is stable cardiomegaly.  The left lung is clear.  Partial clearing of previously noted lower right lung airspace haziness.  New airspace opacity in mid-right lung just above right minor fissure.   New airspace opacity in upper right lung -these findings are concerning   for pneumonia right clinical setting.  No sizable pleural effusion. No pneumothorax.  Degenerative changes of the thoracic spine.    IMPRESSION:  Clear left lung.  New probable pneumonic infiltrates in mid and upper right lung.    --- End of Report ---            JOHN MARADIGAA MD; Attending Radiologist  This document has been electronically signed. Jan 5 2022  9:44AM    < end of copied text >  < from: TTE Echo Complete w/o Contrast w/ Doppler (01.06.22 @ 10:52) >     EXAM:  ECHO TTE WO CON COMP W DOPP         PROCEDURE DATE:  01/06/2022        INTERPRETATION:  INDICATION: Dyspnea  Sonographer PH    Blood Pressure 177/86    Height 172.7 cm     Weight 104.4 kg    Dimensions:  LA 4.2       Normal Values: 2.0 - 4.0 cm  Ao 3.7        Normal Values: 2.0 - 3.8 cm  SEPTUM 1.4       Normal Values: 0.6 - 1.2 cm  PWT 1.4       Normal Values: 0.6 - 1.1 cm  LVIDd 5.2         Normal Values: 3.0 - 5.6 cm  LVIDs 4.2         Normal Values: 1.8 - 4.0 cm      OBSERVATIONS:  Technically difficult study  Mitral Valve: Mitral annular calcification with thickened leaflets, mild   MR.  Aortic Valve/Aorta: Calcified trileaflet aortic valve with decreased   opening. Peak transaortic valve gradient is 29.4 mmHg with a mean   transaortic valve gradient 14.7 mmHg. This consistent with mild aortic   stenosis.  Tricuspid Valve: Mild TR.  Pulmonic Valve: Not well-visualized  Left Atrium: Enlarged  Right Atrium: Not well-visualized  Left Ventricle: Moderate left ventricular hypertrophy with normal   systolic function, estimated LVEF of 55%.  Right Ventricle: Grossly normal size and systolic function.  Pericardium: no significant pericardial effusion.  Pulmonary/RV Pressure: estimated PA systolic pressure of 32mmHg        IMPRESSION:  Technically difficult study  Moderate left ventricular hypertrophy with normal systolic function,   estimated LVEF of 55%.  Grossly normal RV size and systolic function.  Calcified trileaflet aortic valve with mild aortic stenosis  Mild MR andTR.  No significant pericardial effusion.    --- End of Report ---              VIET GREENWOOD MD; Attending Cardiologist  This document has been electronically signed. Jan 7 2022 11:19AM    < end of copied text >

## 2022-01-07 NOTE — PROGRESS NOTE ADULT - ASSESSMENT
77yo M from group home, with PMH of DM2, HTN, HLD, CAD (s/p stents), Afib (on Eliquis), stage 3 CKD, hx of MGUS; a/w acute hypoxic and hypercarbic respiratory failure due to COVID-19 PNA, and acute on chronic diastolic CHF.    #Acute hypoxic and hypercarbic respiratory failure due to COVID19 PNA and acute CHF:  -blood gas improving significantly   -c/w remdesivir (day 4/5)  -continue dexamethasone 6mg daily - plan for 10-day course.  -continue suppl O2 support - on 3L currently - wean as tolerated  -airborne/contact precautions  -trending CBC diff, CMP, and  inflammatory markers  -Pulm/ID following ([facility]), recs appreciated  -GI ppx with protonix while on decadron    Acute on chronic diastolic CHF:  - c/w lasix 40mg IV bid; strict I/Os; monitoring hemodynamics/renal function w/diuresis  - monitor O2, daily weights   - cardio ([facility]), recs appreciated    Acute epistaxis:  - likely mucosal bleed from drying O2 and bled longer due to Eliquis, ASA, and elevated BUN  - bleeding controlled with Afrin/lidocaine soaked cotton with pressure for 5 minutes per ENT recs  - ENT ([facility]), recs appreciated    ARIELLA on CKD 3:  - baseline Cr appears to be ~2.0 - renally dose meds, no nephrotoxics  - Cr up to 2.6, but now starting to improve while on lasix   - nephro ([facility]), recs appreciated    Anemia:  - pt with hx of MGUS and CKD likely contributing to anemia, along with current acute illness  - Hgb had trended down to 7.5, now improving and >8  - no gross hematuria, hematochezia, melena; monitor for signs of blood loss  - discussed blood transfusion with the pt who is hesitant as he is feeling overall better  - Retacrit per nephro orders for the renal failure component of anemia   - found to be iron deficient, so started on venofer 100mg IV q24h x3 doses  - if pt agreeable to transfusion, will transfuse for goal Hgb > 8  - recheck CBC given epistaxis today    HTN urgency:  - continue coreg, clonidine, norvasc, hydralazine, lasix  - cardio ([facility]), recs appreciated    Afib:  - c/w Eliquis  - c/w coreg  - cardio ([facility]), recs appreciated    Type 2 DM:  - goal glucose 100-180  - monitor FSG lazara as pt on decadron   - c/w HISS    psych - continue abilify, lamictal, effexor    DVT prophy   - c/w Eliquis

## 2022-01-08 LAB
-  AMIKACIN: SIGNIFICANT CHANGE UP
-  AMOXICILLIN/CLAVULANIC ACID: SIGNIFICANT CHANGE UP
-  AMPICILLIN/SULBACTAM: SIGNIFICANT CHANGE UP
-  AMPICILLIN: SIGNIFICANT CHANGE UP
-  AZTREONAM: SIGNIFICANT CHANGE UP
-  CEFAZOLIN: SIGNIFICANT CHANGE UP
-  CEFEPIME: SIGNIFICANT CHANGE UP
-  CEFOXITIN: SIGNIFICANT CHANGE UP
-  CEFTRIAXONE: SIGNIFICANT CHANGE UP
-  CIPROFLOXACIN: SIGNIFICANT CHANGE UP
-  ERTAPENEM: SIGNIFICANT CHANGE UP
-  GENTAMICIN: SIGNIFICANT CHANGE UP
-  LEVOFLOXACIN: SIGNIFICANT CHANGE UP
-  MEROPENEM: SIGNIFICANT CHANGE UP
-  NITROFURANTOIN: SIGNIFICANT CHANGE UP
-  PIPERACILLIN/TAZOBACTAM: SIGNIFICANT CHANGE UP
-  TOBRAMYCIN: SIGNIFICANT CHANGE UP
-  TRIMETHOPRIM/SULFAMETHOXAZOLE: SIGNIFICANT CHANGE UP
ALBUMIN SERPL ELPH-MCNC: 2.6 G/DL — LOW (ref 3.3–5)
ALP SERPL-CCNC: 56 U/L — SIGNIFICANT CHANGE UP (ref 40–120)
ALT FLD-CCNC: 25 U/L — SIGNIFICANT CHANGE UP (ref 12–78)
ANION GAP SERPL CALC-SCNC: 8 MMOL/L — SIGNIFICANT CHANGE UP (ref 5–17)
AST SERPL-CCNC: 15 U/L — SIGNIFICANT CHANGE UP (ref 15–37)
BASOPHILS # BLD AUTO: 0.01 K/UL — SIGNIFICANT CHANGE UP (ref 0–0.2)
BASOPHILS NFR BLD AUTO: 0.2 % — SIGNIFICANT CHANGE UP (ref 0–2)
BILIRUB SERPL-MCNC: 0.2 MG/DL — SIGNIFICANT CHANGE UP (ref 0.2–1.2)
BUN SERPL-MCNC: 44 MG/DL — HIGH (ref 7–23)
CALCIUM SERPL-MCNC: 7.9 MG/DL — LOW (ref 8.5–10.1)
CHLORIDE SERPL-SCNC: 107 MMOL/L — SIGNIFICANT CHANGE UP (ref 96–108)
CO2 SERPL-SCNC: 28 MMOL/L — SIGNIFICANT CHANGE UP (ref 22–31)
CREAT SERPL-MCNC: 2.1 MG/DL — HIGH (ref 0.5–1.3)
CULTURE RESULTS: SIGNIFICANT CHANGE UP
EOSINOPHIL # BLD AUTO: 0.03 K/UL — SIGNIFICANT CHANGE UP (ref 0–0.5)
EOSINOPHIL NFR BLD AUTO: 0.5 % — SIGNIFICANT CHANGE UP (ref 0–6)
GLUCOSE SERPL-MCNC: 140 MG/DL — HIGH (ref 70–99)
HCT VFR BLD CALC: 26.2 % — LOW (ref 39–50)
HGB BLD-MCNC: 8.3 G/DL — LOW (ref 13–17)
IMM GRANULOCYTES NFR BLD AUTO: 1.1 % — SIGNIFICANT CHANGE UP (ref 0–1.5)
LEGIONELLA AG UR QL: NEGATIVE — SIGNIFICANT CHANGE UP
LYMPHOCYTES # BLD AUTO: 0.51 K/UL — LOW (ref 1–3.3)
LYMPHOCYTES # BLD AUTO: 9.1 % — LOW (ref 13–44)
MCHC RBC-ENTMCNC: 26.9 PG — LOW (ref 27–34)
MCHC RBC-ENTMCNC: 31.7 GM/DL — LOW (ref 32–36)
MCV RBC AUTO: 84.8 FL — SIGNIFICANT CHANGE UP (ref 80–100)
METHOD TYPE: SIGNIFICANT CHANGE UP
MONOCYTES # BLD AUTO: 0.28 K/UL — SIGNIFICANT CHANGE UP (ref 0–0.9)
MONOCYTES NFR BLD AUTO: 5 % — SIGNIFICANT CHANGE UP (ref 2–14)
NEUTROPHILS # BLD AUTO: 4.71 K/UL — SIGNIFICANT CHANGE UP (ref 1.8–7.4)
NEUTROPHILS NFR BLD AUTO: 84.1 % — HIGH (ref 43–77)
NRBC # BLD: 0 /100 WBCS — SIGNIFICANT CHANGE UP (ref 0–0)
ORGANISM # SPEC MICROSCOPIC CNT: SIGNIFICANT CHANGE UP
ORGANISM # SPEC MICROSCOPIC CNT: SIGNIFICANT CHANGE UP
PLATELET # BLD AUTO: 236 K/UL — SIGNIFICANT CHANGE UP (ref 150–400)
POTASSIUM SERPL-MCNC: 3.4 MMOL/L — LOW (ref 3.5–5.3)
POTASSIUM SERPL-SCNC: 3.4 MMOL/L — LOW (ref 3.5–5.3)
PROT SERPL-MCNC: 5.9 G/DL — LOW (ref 6–8.3)
RBC # BLD: 3.09 M/UL — LOW (ref 4.2–5.8)
RBC # FLD: 17 % — HIGH (ref 10.3–14.5)
SODIUM SERPL-SCNC: 143 MMOL/L — SIGNIFICANT CHANGE UP (ref 135–145)
SPECIMEN SOURCE: SIGNIFICANT CHANGE UP
WBC # BLD: 5.6 K/UL — SIGNIFICANT CHANGE UP (ref 3.8–10.5)
WBC # FLD AUTO: 5.6 K/UL — SIGNIFICANT CHANGE UP (ref 3.8–10.5)

## 2022-01-08 PROCEDURE — 99233 SBSQ HOSP IP/OBS HIGH 50: CPT

## 2022-01-08 PROCEDURE — 99232 SBSQ HOSP IP/OBS MODERATE 35: CPT

## 2022-01-08 RX ORDER — POTASSIUM CHLORIDE 20 MEQ
40 PACKET (EA) ORAL ONCE
Refills: 0 | Status: COMPLETED | OUTPATIENT
Start: 2022-01-08 | End: 2022-01-08

## 2022-01-08 RX ADMIN — Medication 5 MILLIGRAM(S): at 17:20

## 2022-01-08 RX ADMIN — Medication 5 MILLIGRAM(S): at 06:12

## 2022-01-08 RX ADMIN — APIXABAN 5 MILLIGRAM(S): 2.5 TABLET, FILM COATED ORAL at 17:20

## 2022-01-08 RX ADMIN — Medication 100 MILLIGRAM(S): at 21:18

## 2022-01-08 RX ADMIN — FAMOTIDINE 20 MILLIGRAM(S): 10 INJECTION INTRAVENOUS at 11:16

## 2022-01-08 RX ADMIN — Medication 6 MILLIGRAM(S): at 06:12

## 2022-01-08 RX ADMIN — Medication 40 MILLIGRAM(S): at 06:11

## 2022-01-08 RX ADMIN — Medication 1 SPRAY(S): at 13:05

## 2022-01-08 RX ADMIN — ARIPIPRAZOLE 30 MILLIGRAM(S): 15 TABLET ORAL at 11:16

## 2022-01-08 RX ADMIN — Medication 1 SPRAY(S): at 17:21

## 2022-01-08 RX ADMIN — Medication 40 MILLIGRAM(S): at 17:20

## 2022-01-08 RX ADMIN — Medication 100 MILLIGRAM(S): at 06:11

## 2022-01-08 RX ADMIN — CARVEDILOL PHOSPHATE 6.25 MILLIGRAM(S): 80 CAPSULE, EXTENDED RELEASE ORAL at 17:20

## 2022-01-08 RX ADMIN — Medication 150 MILLIGRAM(S): at 11:16

## 2022-01-08 RX ADMIN — Medication 2: at 17:20

## 2022-01-08 RX ADMIN — Medication 650 MILLIGRAM(S): at 18:21

## 2022-01-08 RX ADMIN — REMDESIVIR 500 MILLIGRAM(S): 5 INJECTION INTRAVENOUS at 21:19

## 2022-01-08 RX ADMIN — BUDESONIDE AND FORMOTEROL FUMARATE DIHYDRATE 2 PUFF(S): 160; 4.5 AEROSOL RESPIRATORY (INHALATION) at 21:19

## 2022-01-08 RX ADMIN — Medication 81 MILLIGRAM(S): at 11:16

## 2022-01-08 RX ADMIN — ATORVASTATIN CALCIUM 10 MILLIGRAM(S): 80 TABLET, FILM COATED ORAL at 21:18

## 2022-01-08 RX ADMIN — AMLODIPINE BESYLATE 10 MILLIGRAM(S): 2.5 TABLET ORAL at 06:11

## 2022-01-08 RX ADMIN — BUDESONIDE AND FORMOTEROL FUMARATE DIHYDRATE 2 PUFF(S): 160; 4.5 AEROSOL RESPIRATORY (INHALATION) at 06:12

## 2022-01-08 RX ADMIN — Medication 100 MILLIGRAM(S): at 13:17

## 2022-01-08 RX ADMIN — PREGABALIN 1000 MICROGRAM(S): 225 CAPSULE ORAL at 11:16

## 2022-01-08 RX ADMIN — APIXABAN 5 MILLIGRAM(S): 2.5 TABLET, FILM COATED ORAL at 06:11

## 2022-01-08 RX ADMIN — Medication 0.2 MILLIGRAM(S): at 17:20

## 2022-01-08 RX ADMIN — LAMOTRIGINE 100 MILLIGRAM(S): 25 TABLET, ORALLY DISINTEGRATING ORAL at 11:16

## 2022-01-08 RX ADMIN — MIRTAZAPINE 30 MILLIGRAM(S): 45 TABLET, ORALLY DISINTEGRATING ORAL at 21:18

## 2022-01-08 RX ADMIN — CARVEDILOL PHOSPHATE 6.25 MILLIGRAM(S): 80 CAPSULE, EXTENDED RELEASE ORAL at 06:12

## 2022-01-08 RX ADMIN — Medication 1: at 08:24

## 2022-01-08 RX ADMIN — Medication 0.2 MILLIGRAM(S): at 06:11

## 2022-01-08 RX ADMIN — PANTOPRAZOLE SODIUM 40 MILLIGRAM(S): 20 TABLET, DELAYED RELEASE ORAL at 06:12

## 2022-01-08 RX ADMIN — IRON SUCROSE 100 MILLIGRAM(S): 20 INJECTION, SOLUTION INTRAVENOUS at 11:16

## 2022-01-08 RX ADMIN — Medication 1 SPRAY(S): at 06:12

## 2022-01-08 RX ADMIN — ISOSORBIDE MONONITRATE 120 MILLIGRAM(S): 60 TABLET, EXTENDED RELEASE ORAL at 11:16

## 2022-01-08 RX ADMIN — CEFTRIAXONE 100 MILLIGRAM(S): 500 INJECTION, POWDER, FOR SOLUTION INTRAMUSCULAR; INTRAVENOUS at 13:06

## 2022-01-08 RX ADMIN — Medication 1 MILLIGRAM(S): at 11:16

## 2022-01-08 RX ADMIN — Medication 40 MILLIEQUIVALENT(S): at 18:13

## 2022-01-08 RX ADMIN — Medication 650 MILLIGRAM(S): at 19:20

## 2022-01-08 NOTE — PROGRESS NOTE ADULT - SUBJECTIVE AND OBJECTIVE BOX
Date/Time Patient Seen:  		  Referring MD:   Data Reviewed	       Patient is a 76y old  Male who presents with a chief complaint of CHF exacerbation (07 Jan 2022 18:15)      Subjective/HPI     PAST MEDICAL & SURGICAL HISTORY:  Hypertension    Diabetes mellitus    Lupus    Hepatitis C    Anxiety and depression    CAD (coronary artery disease)  s/p stents    Diverticulosis    Hyperlipidemia    HTN (hypertension)  c/b multiple episodes of hypertensive urgency    HLD (hyperlipidemia)    Atrial fibrillation  first documented on EKG 10/7/2021    CAD (coronary artery disease)  s/p stents (not on plavix)    Type 2 diabetes mellitus  not on home insulin/ Meds    Anxiety  Depression  multiple psych medications    History of diverticulitis  07/2021    Diverticulosis  c/b GIB in 2020    Afib  on AC    Stage 3 chronic kidney disease    Anemia of chronic disease  Monoclonal Gammopathy-MGUS  pRBC transfusion 10/15/21    Chronic diastolic congestive heart failure    Multiple thyroid nodules    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  Had surgery ( April 2013 )    S/P tonsillectomy    S/P arthroscopic knee surgery  Bilateral ( 2005 )    Torsion of testicle  Had surgery at age 14    Pilonidal cyst  Had surgery ( 1969 )    S/P cataract surgery  Bilateral    H/O hernia repair          Medication list         MEDICATIONS  (STANDING):  amLODIPine   Tablet 10 milliGRAM(s) Oral daily  apixaban 5 milliGRAM(s) Oral two times a day  ARIPiprazole 30 milliGRAM(s) Oral daily  aspirin enteric coated 81 milliGRAM(s) Oral daily  atorvastatin 10 milliGRAM(s) Oral at bedtime  budesonide 160 MICROgram(s)/formoterol 4.5 MICROgram(s) Inhaler 2 Puff(s) Inhalation two times a day  carvedilol 6.25 milliGRAM(s) Oral every 12 hours  cefTRIAXone   IVPB 1000 milliGRAM(s) IV Intermittent every 24 hours  cloNIDine 0.2 milliGRAM(s) Oral two times a day  cyanocobalamin 1000 MICROGram(s) Oral daily  dexAMETHasone     Tablet 6 milliGRAM(s) Oral daily  dextrose 40% Gel 15 Gram(s) Oral once  dextrose 5%. 1000 milliLiter(s) (50 mL/Hr) IV Continuous <Continuous>  dextrose 5%. 1000 milliLiter(s) (100 mL/Hr) IV Continuous <Continuous>  dextrose 50% Injectable 25 Gram(s) IV Push once  dextrose 50% Injectable 12.5 Gram(s) IV Push once  dextrose 50% Injectable 25 Gram(s) IV Push once  famotidine    Tablet 20 milliGRAM(s) Oral daily  folic acid 1 milliGRAM(s) Oral daily  furosemide   Injectable 40 milliGRAM(s) IV Push two times a day  glucagon  Injectable 1 milliGRAM(s) IntraMuscular once  hydrALAZINE 100 milliGRAM(s) Oral three times a day  insulin lispro (ADMELOG) corrective regimen sliding scale   SubCutaneous three times a day before meals  insulin lispro (ADMELOG) corrective regimen sliding scale   SubCutaneous at bedtime  iron sucrose Injectable 100 milliGRAM(s) IV Push every 24 hours  isosorbide   mononitrate ER Tablet (IMDUR) 120 milliGRAM(s) Oral daily  lamoTRIgine 100 milliGRAM(s) Oral daily  mirtazapine 30 milliGRAM(s) Oral at bedtime  oxybutynin 5 milliGRAM(s) Oral two times a day  pantoprazole    Tablet 40 milliGRAM(s) Oral before breakfast  remdesivir  IVPB   IV Intermittent   remdesivir  IVPB 100 milliGRAM(s) IV Intermittent every 24 hours  sodium chloride 0.65% Nasal 1 Spray(s) Both Nostrils four times a day  venlafaxine XR. 150 milliGRAM(s) Oral daily    MEDICATIONS  (PRN):  acetaminophen     Tablet .. 650 milliGRAM(s) Oral every 6 hours PRN Temp greater or equal to 38.5C (101.3F), Moderate Pain (4 - 6)  ALBUTerol    90 MICROgram(s) HFA Inhaler 2 Puff(s) Inhalation every 6 hours PRN Shortness of Breath and/or Wheezing         Vitals log        ICU Vital Signs Last 24 Hrs  T(C): 36.5 (08 Jan 2022 04:38), Max: 36.6 (07 Jan 2022 12:05)  T(F): 97.7 (08 Jan 2022 04:38), Max: 97.8 (07 Jan 2022 12:05)  HR: 62 (08 Jan 2022 04:38) (53 - 73)  BP: 182/86 (08 Jan 2022 04:38) (164/86 - 182/86)  BP(mean): --  ABP: --  ABP(mean): --  RR: 18 (08 Jan 2022 04:38) (18 - 18)  SpO2: 92% (08 Jan 2022 04:38) (92% - 97%)           Input and Output:  I&O's Detail    06 Jan 2022 07:01  -  07 Jan 2022 07:00  --------------------------------------------------------  IN:  Total IN: 0 mL    OUT:    Voided (mL): 851 mL  Total OUT: 851 mL    Total NET: -851 mL      07 Jan 2022 07:01  -  08 Jan 2022 05:44  --------------------------------------------------------  IN:  Total IN: 0 mL    OUT:    Voided (mL): 1000 mL  Total OUT: 1000 mL    Total NET: -1000 mL          Lab Data                        8.3    4.08  )-----------( 215      ( 07 Jan 2022 17:55 )             26.0     01-07    140  |  107  |  48<H>  ----------------------------<  190<H>  3.4<L>   |  24  |  2.30<H>    Ca    8.1<L>      07 Jan 2022 09:46  Phos  4.1     01-06  Mg     2.5     01-07    TPro  6.2  /  Alb  2.7<L>  /  TBili  0.3  /  DBili  x   /  AST  18  /  ALT  26  /  AlkPhos  55  01-07            Review of Systems	      Objective     Physical Examination  heart s1s2  lung dec BS  abd soft  head nc  verbal        Pertinent Lab findings & Imaging      Nikko:  NO   Adequate UO     I&O's Detail    06 Jan 2022 07:01  -  07 Jan 2022 07:00  --------------------------------------------------------  IN:  Total IN: 0 mL    OUT:    Voided (mL): 851 mL  Total OUT: 851 mL    Total NET: -851 mL      07 Jan 2022 07:01  -  08 Jan 2022 05:44  --------------------------------------------------------  IN:  Total IN: 0 mL    OUT:    Voided (mL): 1000 mL  Total OUT: 1000 mL    Total NET: -1000 mL               Discussed with:     Cultures:	        Radiology

## 2022-01-08 NOTE — PROGRESS NOTE ADULT - ASSESSMENT
75 yo M PMH of A fib, MGUS s/p PRBC transfusion 10/21, anxiety/depression, CAD s/p stents (not on plavix), CHF, Diverticulosis, HLD, HTN, thyroid nodules, CKD stage 3, DM, who presented with SOB and bilateral LE edema. Patient volume overloaded but also found to be COVID positive.    Repeat CXR read as new probable pneumonic infiltrates in R lung. He is being treated for potential pneumonia. He remains afebrile and without leukocytosis. Repeat serum procalcitonin is the same at 0.2. Stable today from respiratory standpoint and feels better overall.    -continue ceftriaxone 1g IV q24h until today, tomorrow can switch to cefpodoxime 200mg PO q12h x 5 days  -continue remdesivir (day 5/5), dexamethasone to complete 10 days  -follow up MRSA nasal PCR, urine legionella and pneumococcal antigens    Cheyenne Ochoa MD  Division of Infectious Diseases   Cell [phone] between 8am and 6pm   After 6pm and weekends please call ID service at [phone].

## 2022-01-08 NOTE — PROGRESS NOTE ADULT - SUBJECTIVE AND OBJECTIVE BOX
Subjective: off BiPAP. On NC O2      MEDICATIONS  (STANDING):  amLODIPine   Tablet 10 milliGRAM(s) Oral daily  apixaban 5 milliGRAM(s) Oral two times a day  ARIPiprazole 30 milliGRAM(s) Oral daily  aspirin enteric coated 81 milliGRAM(s) Oral daily  atorvastatin 10 milliGRAM(s) Oral at bedtime  budesonide 160 MICROgram(s)/formoterol 4.5 MICROgram(s) Inhaler 2 Puff(s) Inhalation two times a day  carvedilol 6.25 milliGRAM(s) Oral every 12 hours  cefTRIAXone   IVPB 1000 milliGRAM(s) IV Intermittent every 24 hours  cloNIDine 0.2 milliGRAM(s) Oral two times a day  cyanocobalamin 1000 MICROGram(s) Oral daily  dexAMETHasone     Tablet 6 milliGRAM(s) Oral daily  dextrose 40% Gel 15 Gram(s) Oral once  dextrose 5%. 1000 milliLiter(s) (50 mL/Hr) IV Continuous <Continuous>  dextrose 5%. 1000 milliLiter(s) (100 mL/Hr) IV Continuous <Continuous>  dextrose 50% Injectable 25 Gram(s) IV Push once  dextrose 50% Injectable 12.5 Gram(s) IV Push once  dextrose 50% Injectable 25 Gram(s) IV Push once  famotidine    Tablet 20 milliGRAM(s) Oral daily  folic acid 1 milliGRAM(s) Oral daily  furosemide   Injectable 40 milliGRAM(s) IV Push two times a day  glucagon  Injectable 1 milliGRAM(s) IntraMuscular once  hydrALAZINE 100 milliGRAM(s) Oral three times a day  insulin lispro (ADMELOG) corrective regimen sliding scale   SubCutaneous three times a day before meals  insulin lispro (ADMELOG) corrective regimen sliding scale   SubCutaneous at bedtime  isosorbide   mononitrate ER Tablet (IMDUR) 120 milliGRAM(s) Oral daily  lamoTRIgine 100 milliGRAM(s) Oral daily  mirtazapine 30 milliGRAM(s) Oral at bedtime  oxybutynin 5 milliGRAM(s) Oral two times a day  pantoprazole    Tablet 40 milliGRAM(s) Oral before breakfast  potassium chloride    Tablet ER 40 milliEquivalent(s) Oral once  remdesivir  IVPB   IV Intermittent   remdesivir  IVPB 100 milliGRAM(s) IV Intermittent every 24 hours  sodium chloride 0.65% Nasal 1 Spray(s) Both Nostrils four times a day  venlafaxine XR. 150 milliGRAM(s) Oral daily    MEDICATIONS  (PRN):  acetaminophen     Tablet .. 650 milliGRAM(s) Oral every 6 hours PRN Temp greater or equal to 38.5C (101.3F), Moderate Pain (4 - 6)  ALBUTerol    90 MICROgram(s) HFA Inhaler 2 Puff(s) Inhalation every 6 hours PRN Shortness of Breath and/or Wheezing          T(C): 36.5 (01-08-22 @ 11:14), Max: 36.5 (01-08-22 @ 04:38)  HR: 63 (01-08-22 @ 13:09) (53 - 63)  BP: 166/85 (01-08-22 @ 13:09) (164/86 - 182/86)  RR: 16 (01-08-22 @ 11:14) (16 - 18)  SpO2: 96% (01-08-22 @ 11:14) (92% - 97%)  Wt(kg): --        I&O's Detail    07 Jan 2022 07:01  -  08 Jan 2022 07:00  --------------------------------------------------------  IN:  Total IN: 0 mL    OUT:    Voided (mL): 1000 mL  Total OUT: 1000 mL    Total NET: -1000 mL      08 Jan 2022 07:01  -  08 Jan 2022 14:06  --------------------------------------------------------  IN:  Total IN: 0 mL    OUT:    Voided (mL): 1200 mL  Total OUT: 1200 mL    Total NET: -1200 mL               PHYSICAL EXAM:    GENERAL: NAD  NECK: Supple, no inc in JVP  CHEST/LUNG: dec BS  HEART: S1S2  ABDOMEN: Soft  EXTREMITIES:  pos edema      LABS:  CBC Full  -  ( 08 Jan 2022 10:31 )  WBC Count : 5.60 K/uL  RBC Count : 3.09 M/uL  Hemoglobin : 8.3 g/dL  Hematocrit : 26.2 %  Platelet Count - Automated : 236 K/uL  Mean Cell Volume : 84.8 fl  Mean Cell Hemoglobin : 26.9 pg  Mean Cell Hemoglobin Concentration : 31.7 gm/dL  Auto Neutrophil # : 4.71 K/uL  Auto Lymphocyte # : 0.51 K/uL  Auto Monocyte # : 0.28 K/uL  Auto Eosinophil # : 0.03 K/uL  Auto Basophil # : 0.01 K/uL  Auto Neutrophil % : 84.1 %  Auto Lymphocyte % : 9.1 %  Auto Monocyte % : 5.0 %  Auto Eosinophil % : 0.5 %  Auto Basophil % : 0.2 %    01-08    143  |  107  |  44<H>  ----------------------------<  140<H>  3.4<L>   |  28  |  2.10<H>    Ca    7.9<L>      08 Jan 2022 10:31  Mg     2.5     01-07    TPro  5.9<L>  /  Alb  2.6<L>  /  TBili  0.2  /  DBili  x   /  AST  15  /  ALT  25  /  AlkPhos  56  01-08            ASSESSMENT:  1.  CKD stage 4 associated with DM and HTN  2.  Acute/chronic diastolic heart faillure  3.  COVID pneumonia  4.  Chronic fluid overload    PLAN:  Consider transitioning to maint oral diuretic next 48h  Supplement K  Monitor daily BMP and fluid balance

## 2022-01-08 NOTE — PROGRESS NOTE ADULT - SUBJECTIVE AND OBJECTIVE BOX
[facility]  INFECTIOUS DISEASES - Marcelo Garner, [place] [address]  Tel: [phone]     Fax: [phone]  =======================================================    ELLIOT TRIPP [id number]    Follow up: No fevers. On 3L nasal cannula. Denies any pain, cough or SOB. Epistaxis resolved.    Allergies:  No Known Allergies      Antibiotics:  acetaminophen     Tablet .. 650 milliGRAM(s) Oral every 6 hours PRN  ALBUTerol    90 MICROgram(s) HFA Inhaler 2 Puff(s) Inhalation every 6 hours PRN  amLODIPine   Tablet 10 milliGRAM(s) Oral daily  apixaban 5 milliGRAM(s) Oral two times a day  ARIPiprazole 30 milliGRAM(s) Oral daily  aspirin enteric coated 81 milliGRAM(s) Oral daily  atorvastatin 10 milliGRAM(s) Oral at bedtime  budesonide 160 MICROgram(s)/formoterol 4.5 MICROgram(s) Inhaler 2 Puff(s) Inhalation two times a day  carvedilol 6.25 milliGRAM(s) Oral every 12 hours  cefTRIAXone   IVPB 1000 milliGRAM(s) IV Intermittent every 24 hours  cloNIDine 0.2 milliGRAM(s) Oral two times a day  cyanocobalamin 1000 MICROGram(s) Oral daily  dexAMETHasone     Tablet 6 milliGRAM(s) Oral daily  dextrose 40% Gel 15 Gram(s) Oral once  dextrose 5%. 1000 milliLiter(s) IV Continuous <Continuous>  dextrose 5%. 1000 milliLiter(s) IV Continuous <Continuous>  dextrose 50% Injectable 25 Gram(s) IV Push once  dextrose 50% Injectable 12.5 Gram(s) IV Push once  dextrose 50% Injectable 25 Gram(s) IV Push once  famotidine    Tablet 20 milliGRAM(s) Oral daily  folic acid 1 milliGRAM(s) Oral daily  furosemide   Injectable 40 milliGRAM(s) IV Push two times a day  glucagon  Injectable 1 milliGRAM(s) IntraMuscular once  hydrALAZINE 100 milliGRAM(s) Oral three times a day  insulin lispro (ADMELOG) corrective regimen sliding scale   SubCutaneous three times a day before meals  insulin lispro (ADMELOG) corrective regimen sliding scale   SubCutaneous at bedtime  isosorbide   mononitrate ER Tablet (IMDUR) 120 milliGRAM(s) Oral daily  lamoTRIgine 100 milliGRAM(s) Oral daily  mirtazapine 30 milliGRAM(s) Oral at bedtime  oxybutynin 5 milliGRAM(s) Oral two times a day  pantoprazole    Tablet 40 milliGRAM(s) Oral before breakfast  potassium chloride    Tablet ER 40 milliEquivalent(s) Oral once  remdesivir  IVPB   IV Intermittent   remdesivir  IVPB 100 milliGRAM(s) IV Intermittent every 24 hours  sodium chloride 0.65% Nasal 1 Spray(s) Both Nostrils four times a day  venlafaxine XR. 150 milliGRAM(s) Oral daily       REVIEW OF SYSTEMS:  CONSTITUTIONAL: No Fevers   CARDIOVASCULAR:  No chest pain   RESPIRATORY:  see history  GASTROINTESTINAL:  No abdominal pain. no nausea or vomiting  GENITOURINARY:  No dysuria  NEUROLOGIC:  No headache, no dizziness     Physical Exam:  ICU Vital Signs Last 24 Hrs  T(C): 36.5 (08 Jan 2022 11:14), Max: 36.5 (08 Jan 2022 04:38)  T(F): 97.7 (08 Jan 2022 11:14), Max: 97.7 (08 Jan 2022 04:38)  HR: 63 (08 Jan 2022 13:09) (53 - 63)  BP: 166/85 (08 Jan 2022 13:09) (164/86 - 182/86)  BP(mean): --  ABP: --  ABP(mean): --  RR: 16 (08 Jan 2022 11:14) (16 - 18)  SpO2: 96% (08 Jan 2022 11:14) (92% - 97%)     GEN: NAD  HEENT: normocephalic and atraumatic.  NECK: Supple.   LUNGS: Normal respiratory effort  HEART: Regular rate and rhythm  ABDOMEN: Soft, nontender  EXTREMITIES: bipedal edema improved    Labs:  01-08    143  |  107  |  44<H>  ----------------------------<  140<H>  3.4<L>   |  28  |  2.10<H>    Ca    7.9<L>      08 Jan 2022 10:31  Mg     2.5     01-07    TPro  5.9<L>  /  Alb  2.6<L>  /  TBili  0.2  /  DBili  x   /  AST  15  /  ALT  25  /  AlkPhos  56  01-08                          8.3    5.60  )-----------( 236      ( 08 Jan 2022 10:31 )             26.2         LIVER FUNCTIONS - ( 08 Jan 2022 10:31 )  Alb: 2.6 g/dL / Pro: 5.9 g/dL / ALK PHOS: 56 U/L / ALT: 25 U/L / AST: 15 U/L / GGT: x             RECENT CULTURES:  01-06 @ 12:37 Clean Catch Clean Catch (Midstream) Proteus mirabilis    10,000 - 49,000 CFU/mL Proteus mirabilis  <10,000 CFU/ml Normal Urogenital jadon present        01-05 @ 12:15 .Blood Blood-Peripheral     No growth to date.        01-03 @ 16:15 .Blood Blood-Peripheral     No growth to date.        01-03 @ 16:12 Clean Catch Clean Catch (Midstream)     No growth              All imaging and data are reviewed.

## 2022-01-08 NOTE — PROGRESS NOTE ADULT - SUBJECTIVE AND OBJECTIVE BOX
[facility] -- Fifi Bliss Grossman, [place], Abraham Sheridan Savella, [place]: Office # [phone]    Follow Up:  AFib HLD HTN     Subjective/Observations: Patient seen and examined. Patient awake, alert, resting comfortably in chair. Reports VALDEZ however is chronic. No complaints of chest pain,  palpitations or dizziness. Tolerating O2 via nasal cannula.     REVIEW OF SYSTEMS: All review of systems is negative for eye, ENT, GI, , allergic, dermatologic, musculoskeletal and neurologic except as described above    PAST MEDICAL & SURGICAL HISTORY:  HTN (hypertension)  c/b multiple episodes of hypertensive urgency    HLD (hyperlipidemia)    Atrial fibrillation  first documented on EKG 10/7/2021    CAD (coronary artery disease)  s/p stents (not on plavix)    Type 2 diabetes mellitus  not on home insulin/ Meds    Anxiety  Depression  multiple psych medications    History of diverticulitis  07/2021    Diverticulosis  c/b GIB in 2020    Afib  on AC    Stage 3 chronic kidney disease    Anemia of chronic disease  Monoclonal Gammopathy-MGUS  pRBC transfusion 10/15/21    Chronic diastolic congestive heart failure    Multiple thyroid nodules    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  Had surgery ( April 2013 )    S/P tonsillectomy    S/P arthroscopic knee surgery  Bilateral ( 2005 )    Torsion of testicle  Had surgery at age 14    Pilonidal cyst  Had surgery ( 1969 )    S/P cataract surgery  Bilateral    H/O hernia repair        MEDICATIONS  (STANDING):  amLODIPine   Tablet 10 milliGRAM(s) Oral daily  apixaban 5 milliGRAM(s) Oral two times a day  ARIPiprazole 30 milliGRAM(s) Oral daily  aspirin enteric coated 81 milliGRAM(s) Oral daily  atorvastatin 10 milliGRAM(s) Oral at bedtime  budesonide 160 MICROgram(s)/formoterol 4.5 MICROgram(s) Inhaler 2 Puff(s) Inhalation two times a day  carvedilol 6.25 milliGRAM(s) Oral every 12 hours  cefTRIAXone   IVPB 1000 milliGRAM(s) IV Intermittent every 24 hours  cloNIDine 0.2 milliGRAM(s) Oral two times a day  cyanocobalamin 1000 MICROGram(s) Oral daily  dexAMETHasone     Tablet 6 milliGRAM(s) Oral daily  dextrose 40% Gel 15 Gram(s) Oral once  dextrose 5%. 1000 milliLiter(s) (50 mL/Hr) IV Continuous <Continuous>  dextrose 5%. 1000 milliLiter(s) (100 mL/Hr) IV Continuous <Continuous>  dextrose 50% Injectable 25 Gram(s) IV Push once  dextrose 50% Injectable 12.5 Gram(s) IV Push once  dextrose 50% Injectable 25 Gram(s) IV Push once  famotidine    Tablet 20 milliGRAM(s) Oral daily  folic acid 1 milliGRAM(s) Oral daily  furosemide   Injectable 40 milliGRAM(s) IV Push two times a day  glucagon  Injectable 1 milliGRAM(s) IntraMuscular once  hydrALAZINE 100 milliGRAM(s) Oral three times a day  insulin lispro (ADMELOG) corrective regimen sliding scale   SubCutaneous three times a day before meals  insulin lispro (ADMELOG) corrective regimen sliding scale   SubCutaneous at bedtime  isosorbide   mononitrate ER Tablet (IMDUR) 120 milliGRAM(s) Oral daily  lamoTRIgine 100 milliGRAM(s) Oral daily  mirtazapine 30 milliGRAM(s) Oral at bedtime  oxybutynin 5 milliGRAM(s) Oral two times a day  pantoprazole    Tablet 40 milliGRAM(s) Oral before breakfast  potassium chloride    Tablet ER 40 milliEquivalent(s) Oral once  remdesivir  IVPB   IV Intermittent   remdesivir  IVPB 100 milliGRAM(s) IV Intermittent every 24 hours  sodium chloride 0.65% Nasal 1 Spray(s) Both Nostrils four times a day  venlafaxine XR. 150 milliGRAM(s) Oral daily    MEDICATIONS  (PRN):  acetaminophen     Tablet .. 650 milliGRAM(s) Oral every 6 hours PRN Temp greater or equal to 38.5C (101.3F), Moderate Pain (4 - 6)  ALBUTerol    90 MICROgram(s) HFA Inhaler 2 Puff(s) Inhalation every 6 hours PRN Shortness of Breath and/or Wheezing    Allergies    No Known Allergies    Intolerances      Vital Signs Last 24 Hrs  T(C): 36.5 (08 Jan 2022 11:14), Max: 36.5 (08 Jan 2022 04:38)  T(F): 97.7 (08 Jan 2022 11:14), Max: 97.7 (08 Jan 2022 04:38)  HR: 63 (08 Jan 2022 13:09) (53 - 63)  BP: 166/85 (08 Jan 2022 13:09) (164/86 - 182/86)  BP(mean): --  RR: 16 (08 Jan 2022 11:14) (16 - 18)  SpO2: 96% (08 Jan 2022 11:14) (92% - 97%)  I&O's Summary    07 Jan 2022 07:01  -  08 Jan 2022 07:00  --------------------------------------------------------  IN: 0 mL / OUT: 1000 mL / NET: -1000 mL    08 Jan 2022 07:01  -  08 Jan 2022 15:17  --------------------------------------------------------  IN: 0 mL / OUT: 1200 mL / NET: -1200 mL          TELE: Not on telemetry   PHYSICAL EXAM:  Appearance: NAD, no distress, alert, obese  HEENT: Moist Mucous Membranes, Anicteric  Cardiovascular: Regular rate and rhythm, Normal S1 S2, No JVD,+ murmurs, No rubs, gallops or clicks  Respiratory: Non-labored, Diminished at bases , No rales, No rhonchi, No wheezing.   Gastrointestinal:  Soft, Non-tender, + BS  Neurologic: Non-focal  Skin: Warm and dry, No visible rashes or ulcers, No ecchymosis, No cyanosis  Musculoskeletal: No clubbing, No cyanosis, No joint swelling/tenderness  Psychiatry: Mood & affect appropriate  Lymph: + 1peripheral edema.     LABS: All Labs Reviewed:                        8.3    5.60  )-----------( 236      ( 08 Jan 2022 10:31 )             26.2                         8.3    4.08  )-----------( 215      ( 07 Jan 2022 17:55 )             26.0                         8.1    5.94  )-----------( 207      ( 07 Jan 2022 09:46 )             26.4     08 Jan 2022 10:31    143    |  107    |  44     ----------------------------<  140    3.4     |  28     |  2.10   07 Jan 2022 09:46    140    |  107    |  48     ----------------------------<  190    3.4     |  24     |  2.30   06 Jan 2022 07:51    138    |  105    |  46     ----------------------------<  116    3.5     |  23     |  2.50     Ca    7.9        08 Jan 2022 10:31  Ca    8.1        07 Jan 2022 09:46  Ca    8.5        06 Jan 2022 07:51  Phos  4.1       06 Jan 2022 07:51  Mg     2.5       07 Jan 2022 09:46  Mg     2.4       06 Jan 2022 07:51    TPro  5.9    /  Alb  2.6    /  TBili  0.2    /  DBili  x      /  AST  15     /  ALT  25     /  AlkPhos  56     08 Jan 2022 10:31  TPro  6.2    /  Alb  2.7    /  TBili  0.3    /  DBili  x      /  AST  18     /  ALT  26     /  AlkPhos  55     07 Jan 2022 09:46  TPro  6.4    /  Alb  2.7    /  TBili  0.2    /  DBili  x      /  AST  18     /  ALT  28     /  AlkPhos  62     06 Jan 2022 07:51      Troponin I, High Sensitivity Result: 117.8 ng/L (01-03-22 @ 12:33)  Troponin I, High Sensitivity Result: 114.7 ng/L (01-03-22 @ 11:14)      D-Dimer Assay, Quantitative: 215 ng/mL DDU (01-03-22 @ 11:14)    12 Lead ECG:   Ventricular Rate 94 BPM    Atrial Rate 91 BPM    QRS Duration 100 ms    Q-T Interval 378 ms    QTC Calculation(Bazett) 472 ms    R Axis -15 degrees    T Axis 261 degrees    Diagnosis Line Atrial fibrillation  Incomplete right bundle branch block  Moderate voltage criteria for LVH, may be normal variant  Nonspecific T wave abnormality  Prolonged QT  Abnormal ECG  Confirmed by Bonifacio BARRERA, Victorino ([id number]) on 1/3/2022 3:17:52 PM (01-03-22 @ 10:49)    < from: TTE Echo Complete w/o Contrast w/ Doppler (01.06.22 @ 10:52) >     EXAM:  ECHO TTE WO CON COMP W DOPP         PROCEDURE DATE:  01/06/2022        INTERPRETATION:  INDICATION: Dyspnea  Sonographer PH    Blood Pressure 177/86    Height 172.7 cm     Weight 104.4 kg    Dimensions:  LA 4.2       Normal Values: 2.0 - 4.0 cm  Ao 3.7        Normal Values: 2.0 - 3.8 cm  SEPTUM 1.4       Normal Values: 0.6 - 1.2 cm  PWT 1.4       Normal Values: 0.6 - 1.1 cm  LVIDd 5.2         Normal Values: 3.0 - 5.6 cm  LVIDs 4.2         Normal Values: 1.8 - 4.0 cm      OBSERVATIONS:  Technically difficult study  Mitral Valve: Mitral annular calcification with thickened leaflets, mild   MR.  Aortic Valve/Aorta: Calcified trileaflet aortic valve with decreased   opening. Peak transaortic valve gradient is 29.4 mmHg with a mean   transaortic valve gradient 14.7 mmHg. This consistent with mild aortic   stenosis.  Tricuspid Valve: Mild TR.  Pulmonic Valve: Not well-visualized  Left Atrium: Enlarged  Right Atrium: Not well-visualized  Left Ventricle: Moderate left ventricular hypertrophy with normal   systolic function, estimated LVEF of 55%.  Right Ventricle: Grossly normal size and systolic function.  Pericardium: no significant pericardial effusion.  Pulmonary/RV Pressure: estimated PA systolic pressure of 32mmHg        IMPRESSION:  Technically difficult study  Moderate left ventricular hypertrophy with normal systolic function,   estimated LVEF of 55%.  Grossly normal RV size and systolic function.  Calcified trileaflet aortic valve with mild aortic stenosis  Mild MR andTR.  No significant pericardial effusion.    --- End of Report ---        < end of copied text >    < from: Xray Chest 1 View-PORTABLE IMMEDIATE (Xray Chest 1 View-PORTABLE IMMEDIATE .) (01.05.22 @ 08:14) >    ACC: [id number] EXAM:  XR CHEST PORTABLE IMMED 1V                          PROCEDURE DATE:  01/05/2022          INTERPRETATION:  DATE OF STUDY: 1/5/22    PRIOR:12/24/21    CLINICAL INDICATION: New fever.    TECHNIQUE: portable chest.    FINDINGS:  There is stable cardiomegaly.  The left lung is clear.  Partial clearing of previously noted lower right lung airspace haziness.  New airspace opacity in mid-right lung just above right minor fissure.   New airspace opacity in upper right lung -these findings are concerning   for pneumonia right clinical setting.  No sizable pleural effusion. No pneumothorax.  Degenerative changes of the thoracic spine.    IMPRESSION:  Clear left lung.  New probable pneumonic infiltrates in mid and upper right lung.    --- End of Report ---        < end of copied text >

## 2022-01-08 NOTE — PROGRESS NOTE ADULT - ASSESSMENT
77yo M from group home, with PMH of DM2, HTN, HLD, CAD (s/p stents), Afib (on Eliquis), stage 3 CKD, hx of MGUS; a/w acute hypoxic and hypercarbic respiratory failure due to COVID-19 PNA, and acute on chronic diastolic CHF.    Acute hypoxic and hypercarbic respiratory failure due to COVID19 PNA and acute CHF:  -blood gas improving significantly   -c/w remdesivir (day 5/5)  -continue dexamethasone 6mg daily - plan for 10-day course.  -continue suppl O2 support - on 3L currently - wean as tolerated  -airborne/contact precautions  -trending CBC diff, CMP, and  inflammatory markers  -Pulm/ID following ([facility]), recs appreciated  -GI ppx with protonix while on decadron    Acute on chronic diastolic CHF:  - c/w lasix 40mg IV bid; strict I/Os; monitoring hemodynamics/renal function w/diuresis  - monitor O2, daily weights   - cardio ([facility]), recs appreciated    Acute epistaxis:  - likely mucosal bleed from drying O2 and bled longer due to Eliquis, ASA, and elevated BUN  - bleeding controlled with Afrin/lidocaine soaked cotton with pressure for 5 minutes per ENT recs  - ENT ([facility]), recs appreciated  - c/w humidified O2 and Ocean spray    ARIELLA on CKD 3:  - baseline Cr appears to be ~2.0 - renally dose meds, no nephrotoxics  - Cr up to 2.6, but now improving while on lasix   - nephro ([facility]), recs appreciated    Anemia:  - pt with hx of MGUS and CKD likely contributing to anemia, along with current acute illness  - Hgb had trended down to 7.5, now improving and >8  - no gross hematuria, hematochezia, melena; monitor for signs of blood loss  - discussed blood transfusion with the pt who is hesitant as he is feeling overall better  - Retacrit per nephro orders for the renal failure component of anemia   - found to be iron deficient, so given venofer 100mg IV q24h x3 doses  - if pt agreeable to transfusion, will transfuse for goal Hgb > 8    HTN urgency:  - continue coreg, clonidine, norvasc, hydralazine, lasix  - BP still uncontrolled despite diuresis, will increase clonidine to 0.3mg po BID  - cardio ([facility]), recs appreciated    Afib:  - c/w Eliquis  - c/w coreg  - cardio ([facility]), recs appreciated    Type 2 DM:  - goal glucose 100-180  - monitor FSG lazara as pt on decadron   - c/w HISS    psych - continue abilify, lamictal, effexor    DVT prophy   - c/w Eliquis

## 2022-01-08 NOTE — PROGRESS NOTE ADULT - ASSESSMENT
75 yo M with PMH AFib on Eliquis of Type 2 DM (not on insulin), BPH, CAD s/p stents >4 years ago (not on Plavix), diverticulitis (hospitalized 07/2020 at [facility]), GIB 2/2 diverticulosis (2020), anxiety, Lupus, HTN, HLD, CKD stage 3, HepC, hx of blood clots in brain (s/p surgery 2013) living in a group home [facility] presenting to [facility] with shortness of breath and leg swelling. Found to be covid positive. Cardio consulted for elevated troponins and CHF.    Volume overloaded, CAD, HTN, HLD  - remains volume overload on exam   - BNP 4574  - continue IV Lasix BID for now and monitor renal indices can hopefully transition to po 48 hours per renal  - strict intake and output , net neg 1.2 Liters   - continue supplemental oxygen, wean off as tolerated   - TTE:(10/21): Normal LV size with normal LVSF with LVEF 55-60%. LV diastolic function cannot determine due to atrial fibrillation. Mild mitral regurgitation is present. Mild aortic stenosis is  present. Mild tricuspid regurgitation is present . No evidence of any pulmonary hypertension    - Troponin trended, remains elevated, likely demand ischemia in setting of above, plan for outpatient stress when underlying COVID  resolves however pt remains asymptomatic   - EKG NSR, no ischemic changes noted    - Tele now d/c'd but was SB-SR 50-80s  - Hx of Afib continue home Eliquis  - continue ASA    - BP uncontrolled 160-170s  - continue BB, CCB  - continue hydralazine  - continue Imdur   - Continue Clonidine, can titrate up as needed    - anemia noted per primary   - given Hx of CAD transfuse PRN to keep hgb >8, give lasix post transfusion     - COVID management as per primary     - Monitor and replete lytes, keep K>4, Mg>2.  - All other medical needs as per primary team.  - Other cardiovascular workup will depend on clinical course.  - Will continue to follow.    Bernice Arora, MS KACEY, [facility]  Nurse Practitioner- Cardiology   Spectra #[phone]

## 2022-01-08 NOTE — PROGRESS NOTE ADULT - SUBJECTIVE AND OBJECTIVE BOX
Patient is a 76y old  Male who presents with a chief complaint of worsening SOB.         INTERVAL HPI/OVERNIGHT EVENTS: Pt states he feels improved with no SOB at rest, but still has VALDEZ. No further epistaxis from right nares. Pt on NC and tolerating NC well. Pt denies CP, fever, chills, palpitations.     MEDICATIONS  (STANDING):  amLODIPine   Tablet 10 milliGRAM(s) Oral daily  apixaban 5 milliGRAM(s) Oral two times a day  ARIPiprazole 30 milliGRAM(s) Oral daily  aspirin enteric coated 81 milliGRAM(s) Oral daily  atorvastatin 10 milliGRAM(s) Oral at bedtime  budesonide 160 MICROgram(s)/formoterol 4.5 MICROgram(s) Inhaler 2 Puff(s) Inhalation two times a day  carvedilol 6.25 milliGRAM(s) Oral every 12 hours  cloNIDine 0.2 milliGRAM(s) Oral two times a day  cyanocobalamin 1000 MICROGram(s) Oral daily  dexAMETHasone     Tablet 6 milliGRAM(s) Oral daily  dextrose 40% Gel 15 Gram(s) Oral once  dextrose 5%. 1000 milliLiter(s) (50 mL/Hr) IV Continuous <Continuous>  dextrose 5%. 1000 milliLiter(s) (100 mL/Hr) IV Continuous <Continuous>  dextrose 50% Injectable 25 Gram(s) IV Push once  dextrose 50% Injectable 12.5 Gram(s) IV Push once  dextrose 50% Injectable 25 Gram(s) IV Push once  famotidine    Tablet 20 milliGRAM(s) Oral daily  folic acid 1 milliGRAM(s) Oral daily  furosemide   Injectable 40 milliGRAM(s) IV Push two times a day  glucagon  Injectable 1 milliGRAM(s) IntraMuscular once  hydrALAZINE 100 milliGRAM(s) Oral three times a day  insulin lispro (ADMELOG) corrective regimen sliding scale   SubCutaneous three times a day before meals  insulin lispro (ADMELOG) corrective regimen sliding scale   SubCutaneous at bedtime  isosorbide   mononitrate ER Tablet (IMDUR) 90 milliGRAM(s) Oral daily  lamoTRIgine 100 milliGRAM(s) Oral daily  mirtazapine 30 milliGRAM(s) Oral at bedtime  oxybutynin 5 milliGRAM(s) Oral two times a day  pantoprazole    Tablet 40 milliGRAM(s) Oral before breakfast  remdesivir  IVPB   IV Intermittent   remdesivir  IVPB 100 milliGRAM(s) IV Intermittent every 24 hours  venlafaxine XR. 150 milliGRAM(s) Oral daily    MEDICATIONS  (PRN):  acetaminophen     Tablet .. 650 milliGRAM(s) Oral every 6 hours PRN Temp greater or equal to 38.5C (101.3F), Moderate Pain (4 - 6)  ALBUTerol    90 MICROgram(s) HFA Inhaler 2 Puff(s) Inhalation every 6 hours PRN Shortness of Breath and/or Wheezing      Allergies    No Known Allergies    Intolerances        REVIEW OF SYSTEMS:  CONSTITUTIONAL: No fever or chills  HEENT:  No headache, no sore throat  RESPIRATORY: +cough, shortness of breath (resolved at rest) ; +VALDEZ  CARDIOVASCULAR: No chest pain, palpitations  GASTROINTESTINAL: No abd pain, nausea, vomiting, or diarrhea  GENITOURINARY: No dysuria, frequency, or hematuria  NEUROLOGICAL: no focal weakness or dizziness  MUSCULOSKELETAL: +leg swelling (improving) ; no myalgias     Vital Signs Last 24 Hrs  T(C): 36.5 (08 Jan 2022 11:14), Max: 36.5 (08 Jan 2022 04:38)  T(F): 97.7 (08 Jan 2022 11:14), Max: 97.7 (08 Jan 2022 04:38)  HR: 63 (08 Jan 2022 13:09) (53 - 63)  BP: 166/85 (08 Jan 2022 13:09) (164/86 - 182/86)  BP(mean): --  RR: 16 (08 Jan 2022 11:14) (16 - 18)  SpO2: 96% (08 Jan 2022 11:14) (92% - 97%)    PHYSICAL EXAM:  GENERAL: NAD at rest on NC  HEENT:  anicteric, moist mucous membranes  CHEST/LUNG:  decreased breath sounds b/l bases  HEART:  rrr, S1, S2, +murmur  ABDOMEN:  BS+, soft, nontender, nondistended  EXTREMITIES: + LE edema b/l, no cyanosis, or calf tenderness  NERVOUS SYSTEM: answers questions and follows commands appropriately    LABS:                                              8.3    5.60  )-----------( 236      ( 08 Jan 2022 10:31 )             26.2     CBC Full  -  ( 08 Jan 2022 10:31 )  WBC Count : 5.60 K/uL  Hemoglobin : 8.3 g/dL  Hematocrit : 26.2 %  Platelet Count - Automated : 236 K/uL  Mean Cell Volume : 84.8 fl  Mean Cell Hemoglobin : 26.9 pg  Mean Cell Hemoglobin Concentration : 31.7 gm/dL  Auto Neutrophil # : 4.71 K/uL  Auto Lymphocyte # : 0.51 K/uL  Auto Monocyte # : 0.28 K/uL  Auto Eosinophil # : 0.03 K/uL  Auto Basophil # : 0.01 K/uL  Auto Neutrophil % : 84.1 %  Auto Lymphocyte % : 9.1 %  Auto Monocyte % : 5.0 %  Auto Eosinophil % : 0.5 %  Auto Basophil % : 0.2 %    01-08    143  |  107  |  44<H>  ----------------------------<  140<H>  3.4<L>   |  28  |  2.10<H>    Ca    7.9<L>      08 Jan 2022 10:31  Mg     2.5     01-07    TPro  5.9<L>  /  Alb  2.6<L>  /  TBili  0.2  /  DBili  x   /  AST  15  /  ALT  25  /  AlkPhos  56  01-08            Culture - Blood (collected 01-03-22 @ 16:15)  Source: .Blood Blood-Peripheral  Preliminary Report (01-04-22 @ 17:02):    No growth to date.    Culture - Blood (collected 01-03-22 @ 16:15)  Source: .Blood Blood-Peripheral  Preliminary Report (01-04-22 @ 17:02):    No growth to date.    Culture - Urine (collected 01-03-22 @ 16:12)  Source: Clean Catch Clean Catch (Midstream)  Final Report (01-04-22 @ 13:40):    No growth        RADIOLOGY & ADDITIONAL TESTS:    Personally reviewed.     Consultant(s) Notes Reviewed:  [x] YES  [ ] NO     Patient is a 76y old  Male who presents with a chief complaint of worsening SOB.          INTERVAL HPI/OVERNIGHT EVENTS: Pt states he feels improved with no SOB at rest, but still has VALDEZ. No further epistaxis from right nares. Pt on NC and tolerating NC well. Pt denies CP, fever, chills, palpitations.     MEDICATIONS  (STANDING):  amLODIPine   Tablet 10 milliGRAM(s) Oral daily  apixaban 5 milliGRAM(s) Oral two times a day  ARIPiprazole 30 milliGRAM(s) Oral daily  aspirin enteric coated 81 milliGRAM(s) Oral daily  atorvastatin 10 milliGRAM(s) Oral at bedtime  budesonide 160 MICROgram(s)/formoterol 4.5 MICROgram(s) Inhaler 2 Puff(s) Inhalation two times a day  carvedilol 6.25 milliGRAM(s) Oral every 12 hours  cloNIDine 0.2 milliGRAM(s) Oral two times a day  cyanocobalamin 1000 MICROGram(s) Oral daily  dexAMETHasone     Tablet 6 milliGRAM(s) Oral daily  dextrose 40% Gel 15 Gram(s) Oral once  dextrose 5%. 1000 milliLiter(s) (50 mL/Hr) IV Continuous <Continuous>  dextrose 5%. 1000 milliLiter(s) (100 mL/Hr) IV Continuous <Continuous>  dextrose 50% Injectable 25 Gram(s) IV Push once  dextrose 50% Injectable 12.5 Gram(s) IV Push once  dextrose 50% Injectable 25 Gram(s) IV Push once  famotidine    Tablet 20 milliGRAM(s) Oral daily  folic acid 1 milliGRAM(s) Oral daily  furosemide   Injectable 40 milliGRAM(s) IV Push two times a day  glucagon  Injectable 1 milliGRAM(s) IntraMuscular once  hydrALAZINE 100 milliGRAM(s) Oral three times a day  insulin lispro (ADMELOG) corrective regimen sliding scale   SubCutaneous three times a day before meals  insulin lispro (ADMELOG) corrective regimen sliding scale   SubCutaneous at bedtime  isosorbide   mononitrate ER Tablet (IMDUR) 90 milliGRAM(s) Oral daily  lamoTRIgine 100 milliGRAM(s) Oral daily  mirtazapine 30 milliGRAM(s) Oral at bedtime  oxybutynin 5 milliGRAM(s) Oral two times a day  pantoprazole    Tablet 40 milliGRAM(s) Oral before breakfast  remdesivir  IVPB   IV Intermittent   remdesivir  IVPB 100 milliGRAM(s) IV Intermittent every 24 hours  venlafaxine XR. 150 milliGRAM(s) Oral daily    MEDICATIONS  (PRN):  acetaminophen     Tablet .. 650 milliGRAM(s) Oral every 6 hours PRN Temp greater or equal to 38.5C (101.3F), Moderate Pain (4 - 6)  ALBUTerol    90 MICROgram(s) HFA Inhaler 2 Puff(s) Inhalation every 6 hours PRN Shortness of Breath and/or Wheezing      Allergies    No Known Allergies    Intolerances        REVIEW OF SYSTEMS:  CONSTITUTIONAL: No fever or chills  HEENT:  No headache, no sore throat  RESPIRATORY: +cough, shortness of breath (resolved at rest) ; +VALDEZ  CARDIOVASCULAR: No chest pain, palpitations  GASTROINTESTINAL: No abd pain, nausea, vomiting, or diarrhea  GENITOURINARY: No dysuria, frequency, or hematuria  NEUROLOGICAL: no focal weakness or dizziness  MUSCULOSKELETAL: +leg swelling (improving) ; no myalgias     Vital Signs Last 24 Hrs  T(C): 36.5 (08 Jan 2022 11:14), Max: 36.5 (08 Jan 2022 04:38)  T(F): 97.7 (08 Jan 2022 11:14), Max: 97.7 (08 Jan 2022 04:38)  HR: 63 (08 Jan 2022 13:09) (53 - 63)  BP: 166/85 (08 Jan 2022 13:09) (164/86 - 182/86)  BP(mean): --  RR: 16 (08 Jan 2022 11:14) (16 - 18)  SpO2: 96% (08 Jan 2022 11:14) (92% - 97%)    PHYSICAL EXAM:  GENERAL: NAD at rest on NC  HEENT:  anicteric, moist mucous membranes  CHEST/LUNG:  decreased breath sounds b/l bases  HEART:  rrr, S1, S2, +murmur  ABDOMEN:  BS+, soft, nontender, nondistended  EXTREMITIES: + LE edema b/l, no cyanosis, or calf tenderness  NERVOUS SYSTEM: answers questions and follows commands appropriately    LABS:                                              8.3    5.60  )-----------( 236      ( 08 Jan 2022 10:31 )             26.2     CBC Full  -  ( 08 Jan 2022 10:31 )  WBC Count : 5.60 K/uL  Hemoglobin : 8.3 g/dL  Hematocrit : 26.2 %  Platelet Count - Automated : 236 K/uL  Mean Cell Volume : 84.8 fl  Mean Cell Hemoglobin : 26.9 pg  Mean Cell Hemoglobin Concentration : 31.7 gm/dL  Auto Neutrophil # : 4.71 K/uL  Auto Lymphocyte # : 0.51 K/uL  Auto Monocyte # : 0.28 K/uL  Auto Eosinophil # : 0.03 K/uL  Auto Basophil # : 0.01 K/uL  Auto Neutrophil % : 84.1 %  Auto Lymphocyte % : 9.1 %  Auto Monocyte % : 5.0 %  Auto Eosinophil % : 0.5 %  Auto Basophil % : 0.2 %    01-08    143  |  107  |  44<H>  ----------------------------<  140<H>  3.4<L>   |  28  |  2.10<H>    Ca    7.9<L>      08 Jan 2022 10:31  Mg     2.5     01-07    TPro  5.9<L>  /  Alb  2.6<L>  /  TBili  0.2  /  DBili  x   /  AST  15  /  ALT  25  /  AlkPhos  56  01-08            Culture - Blood (collected 01-03-22 @ 16:15)  Source: .Blood Blood-Peripheral  Preliminary Report (01-04-22 @ 17:02):    No growth to date.    Culture - Blood (collected 01-03-22 @ 16:15)  Source: .Blood Blood-Peripheral  Preliminary Report (01-04-22 @ 17:02):    No growth to date.    Culture - Urine (collected 01-03-22 @ 16:12)  Source: Clean Catch Clean Catch (Midstream)  Final Report (01-04-22 @ 13:40):    No growth        RADIOLOGY & ADDITIONAL TESTS:    Personally reviewed.     Consultant(s) Notes Reviewed:  [x] YES  [ ] NO

## 2022-01-08 NOTE — PROGRESS NOTE ADULT - ASSESSMENT
75 yo M PMH of A fib (on Eliquis) , MGUS s/p PRBC transfusion 10/21, anxiety/depression, CAD s/p stents (not on plavix), CHF, Diverticulosis, HLD, HTN, thyroid nodules, CKD stage 3, DM presents to the ED with SOB    on o2 support  use NIPPV as tolerated  VBG repeat noted  vs noted  ENT eval noted - epistaxis - pt is on eliquis - on nasal saline now -   remains on ABX    Previous echo 10/21 showed: Normal LV size with normal LV systolic function with estimated LVEF 55-60%. LV diastolic function cannot determine due to atrial fibrillation  Obesity - exam and risk factors - and features - c/w ELMER - outpatient eval  AF - on AC -   D dimer noted - serial D dimer  cvs rx regimen - diuresis - cxr and proBNP c/w Vol Overload - Cardio eval in progress - old records and TTE reviewed - I and O, serial CE -   dietary discretion  Covid - in vaccinated pt - monitor VS and Sat - Acap and Robitussin PRN - monitor VS and HD and Sat - Decadron is indicated in covid with hypoxemia  pt is on Home Rx regimen of Symbicort - unclear indication - ex smoker - no history of COPD as per pt or medical record - prior CT without Emphysema  isolation for covid -

## 2022-01-09 LAB
ALBUMIN SERPL ELPH-MCNC: 2.8 G/DL — LOW (ref 3.3–5)
ALP SERPL-CCNC: 60 U/L — SIGNIFICANT CHANGE UP (ref 40–120)
ALT FLD-CCNC: 25 U/L — SIGNIFICANT CHANGE UP (ref 12–78)
ANION GAP SERPL CALC-SCNC: 7 MMOL/L — SIGNIFICANT CHANGE UP (ref 5–17)
AST SERPL-CCNC: 16 U/L — SIGNIFICANT CHANGE UP (ref 15–37)
BILIRUB SERPL-MCNC: 0.4 MG/DL — SIGNIFICANT CHANGE UP (ref 0.2–1.2)
BUN SERPL-MCNC: 44 MG/DL — HIGH (ref 7–23)
CALCIUM SERPL-MCNC: 8.8 MG/DL — SIGNIFICANT CHANGE UP (ref 8.5–10.1)
CHLORIDE SERPL-SCNC: 103 MMOL/L — SIGNIFICANT CHANGE UP (ref 96–108)
CO2 SERPL-SCNC: 30 MMOL/L — SIGNIFICANT CHANGE UP (ref 22–31)
CREAT SERPL-MCNC: 2.2 MG/DL — HIGH (ref 0.5–1.3)
GLUCOSE SERPL-MCNC: 241 MG/DL — HIGH (ref 70–99)
HCT VFR BLD CALC: 27.9 % — LOW (ref 39–50)
HGB BLD-MCNC: 8.7 G/DL — LOW (ref 13–17)
MAGNESIUM SERPL-MCNC: 2 MG/DL — SIGNIFICANT CHANGE UP (ref 1.6–2.6)
MCHC RBC-ENTMCNC: 26.7 PG — LOW (ref 27–34)
MCHC RBC-ENTMCNC: 31.2 GM/DL — LOW (ref 32–36)
MCV RBC AUTO: 85.6 FL — SIGNIFICANT CHANGE UP (ref 80–100)
MRSA PCR RESULT.: SIGNIFICANT CHANGE UP
NRBC # BLD: 0 /100 WBCS — SIGNIFICANT CHANGE UP (ref 0–0)
PHOSPHATE SERPL-MCNC: 3 MG/DL — SIGNIFICANT CHANGE UP (ref 2.5–4.5)
PLATELET # BLD AUTO: 249 K/UL — SIGNIFICANT CHANGE UP (ref 150–400)
POTASSIUM SERPL-MCNC: 3.2 MMOL/L — LOW (ref 3.5–5.3)
POTASSIUM SERPL-SCNC: 3.2 MMOL/L — LOW (ref 3.5–5.3)
PROT SERPL-MCNC: 6.2 G/DL — SIGNIFICANT CHANGE UP (ref 6–8.3)
RBC # BLD: 3.26 M/UL — LOW (ref 4.2–5.8)
RBC # FLD: 16.8 % — HIGH (ref 10.3–14.5)
S AUREUS DNA NOSE QL NAA+PROBE: SIGNIFICANT CHANGE UP
S PNEUM AG UR QL: NEGATIVE — SIGNIFICANT CHANGE UP
SODIUM SERPL-SCNC: 140 MMOL/L — SIGNIFICANT CHANGE UP (ref 135–145)
WBC # BLD: 6.28 K/UL — SIGNIFICANT CHANGE UP (ref 3.8–10.5)
WBC # FLD AUTO: 6.28 K/UL — SIGNIFICANT CHANGE UP (ref 3.8–10.5)

## 2022-01-09 PROCEDURE — 99233 SBSQ HOSP IP/OBS HIGH 50: CPT

## 2022-01-09 RX ORDER — SENNA PLUS 8.6 MG/1
2 TABLET ORAL AT BEDTIME
Refills: 0 | Status: DISCONTINUED | OUTPATIENT
Start: 2022-01-09 | End: 2022-01-18

## 2022-01-09 RX ORDER — CARVEDILOL PHOSPHATE 80 MG/1
12.5 CAPSULE, EXTENDED RELEASE ORAL EVERY 12 HOURS
Refills: 0 | Status: DISCONTINUED | OUTPATIENT
Start: 2022-01-09 | End: 2022-01-18

## 2022-01-09 RX ORDER — POLYETHYLENE GLYCOL 3350 17 G/17G
17 POWDER, FOR SOLUTION ORAL ONCE
Refills: 0 | Status: COMPLETED | OUTPATIENT
Start: 2022-01-09 | End: 2022-01-09

## 2022-01-09 RX ORDER — POTASSIUM CHLORIDE 20 MEQ
40 PACKET (EA) ORAL ONCE
Refills: 0 | Status: COMPLETED | OUTPATIENT
Start: 2022-01-09 | End: 2022-01-09

## 2022-01-09 RX ADMIN — Medication 1 SPRAY(S): at 05:07

## 2022-01-09 RX ADMIN — CARVEDILOL PHOSPHATE 12.5 MILLIGRAM(S): 80 CAPSULE, EXTENDED RELEASE ORAL at 17:23

## 2022-01-09 RX ADMIN — PREGABALIN 1000 MICROGRAM(S): 225 CAPSULE ORAL at 12:46

## 2022-01-09 RX ADMIN — PANTOPRAZOLE SODIUM 40 MILLIGRAM(S): 20 TABLET, DELAYED RELEASE ORAL at 05:08

## 2022-01-09 RX ADMIN — Medication 81 MILLIGRAM(S): at 12:46

## 2022-01-09 RX ADMIN — CEFTRIAXONE 100 MILLIGRAM(S): 500 INJECTION, POWDER, FOR SOLUTION INTRAMUSCULAR; INTRAVENOUS at 13:10

## 2022-01-09 RX ADMIN — Medication 100 MILLIGRAM(S): at 05:07

## 2022-01-09 RX ADMIN — Medication 1 MILLIGRAM(S): at 12:46

## 2022-01-09 RX ADMIN — Medication 100 MILLIGRAM(S): at 13:10

## 2022-01-09 RX ADMIN — Medication 1 SPRAY(S): at 21:26

## 2022-01-09 RX ADMIN — Medication 40 MILLIEQUIVALENT(S): at 12:46

## 2022-01-09 RX ADMIN — Medication 150 MILLIGRAM(S): at 12:46

## 2022-01-09 RX ADMIN — APIXABAN 5 MILLIGRAM(S): 2.5 TABLET, FILM COATED ORAL at 17:23

## 2022-01-09 RX ADMIN — FAMOTIDINE 20 MILLIGRAM(S): 10 INJECTION INTRAVENOUS at 12:46

## 2022-01-09 RX ADMIN — SENNA PLUS 2 TABLET(S): 8.6 TABLET ORAL at 21:24

## 2022-01-09 RX ADMIN — POLYETHYLENE GLYCOL 3350 17 GRAM(S): 17 POWDER, FOR SOLUTION ORAL at 17:23

## 2022-01-09 RX ADMIN — Medication 5 MILLIGRAM(S): at 05:07

## 2022-01-09 RX ADMIN — Medication 40 MILLIGRAM(S): at 17:22

## 2022-01-09 RX ADMIN — LAMOTRIGINE 100 MILLIGRAM(S): 25 TABLET, ORALLY DISINTEGRATING ORAL at 12:46

## 2022-01-09 RX ADMIN — APIXABAN 5 MILLIGRAM(S): 2.5 TABLET, FILM COATED ORAL at 05:07

## 2022-01-09 RX ADMIN — ARIPIPRAZOLE 30 MILLIGRAM(S): 15 TABLET ORAL at 12:46

## 2022-01-09 RX ADMIN — MIRTAZAPINE 30 MILLIGRAM(S): 45 TABLET, ORALLY DISINTEGRATING ORAL at 21:24

## 2022-01-09 RX ADMIN — Medication 0.3 MILLIGRAM(S): at 17:23

## 2022-01-09 RX ADMIN — Medication 0.3 MILLIGRAM(S): at 05:07

## 2022-01-09 RX ADMIN — ISOSORBIDE MONONITRATE 120 MILLIGRAM(S): 60 TABLET, EXTENDED RELEASE ORAL at 12:46

## 2022-01-09 RX ADMIN — Medication 0.5 MILLIGRAM(S): at 11:01

## 2022-01-09 RX ADMIN — Medication 2: at 12:32

## 2022-01-09 RX ADMIN — Medication 6 MILLIGRAM(S): at 05:07

## 2022-01-09 RX ADMIN — CARVEDILOL PHOSPHATE 6.25 MILLIGRAM(S): 80 CAPSULE, EXTENDED RELEASE ORAL at 05:08

## 2022-01-09 RX ADMIN — Medication 5 MILLIGRAM(S): at 17:22

## 2022-01-09 RX ADMIN — Medication 2: at 17:23

## 2022-01-09 RX ADMIN — AMLODIPINE BESYLATE 10 MILLIGRAM(S): 2.5 TABLET ORAL at 05:07

## 2022-01-09 RX ADMIN — BUDESONIDE AND FORMOTEROL FUMARATE DIHYDRATE 2 PUFF(S): 160; 4.5 AEROSOL RESPIRATORY (INHALATION) at 05:07

## 2022-01-09 RX ADMIN — BUDESONIDE AND FORMOTEROL FUMARATE DIHYDRATE 2 PUFF(S): 160; 4.5 AEROSOL RESPIRATORY (INHALATION) at 21:24

## 2022-01-09 RX ADMIN — Medication 100 MILLIGRAM(S): at 21:24

## 2022-01-09 RX ADMIN — Medication 1 SPRAY(S): at 12:47

## 2022-01-09 RX ADMIN — ATORVASTATIN CALCIUM 10 MILLIGRAM(S): 80 TABLET, FILM COATED ORAL at 21:24

## 2022-01-09 RX ADMIN — Medication 40 MILLIGRAM(S): at 05:07

## 2022-01-09 NOTE — PROGRESS NOTE ADULT - ASSESSMENT
75yo M from group home, with PMH of DM2, HTN, HLD, CAD (s/p stents), Afib (on Eliquis), stage 3 CKD, hx of MGUS; a/w acute hypoxic and hypercarbic respiratory failure due to COVID-19 PNA, and acute on chronic diastolic CHF.    Acute hypoxic and hypercarbic respiratory failure due to COVID19 PNA and acute CHF:  -blood gas improved significantly   -completed remdesivir 5-day course  -continue dexamethasone 6mg daily - plan for 10-day course.  -has been on NC - now tolerated RA at rest currently - check with ambulation  -airborne/contact precautions  -trending CBC diff, CMP, and  inflammatory markers  -Pulm/ID following ([facility]), recs appreciated  -GI ppx with protonix while on decadron    Acute on chronic diastolic CHF:  - c/w lasix 40mg IV bid; strict I/Os; monitoring hemodynamics/renal function w/diuresis  - monitor O2, daily weights   - cardio ([facility]), recs appreciated    Hypertensive urgency:  - continue coreg, clonidine, norvasc, hydralazine, lasix  - BP still uncontrolled despite diuresis, increased clonidine to 0.3mg po BID  - also will increase coreg to 12.5mg po BID   - cardio ([facility]), recs appreciated  - monitor BP    Acute epistaxis:  - likely mucosal bleed from drying O2 and bled longer due to Eliquis, ASA, and elevated BUN  - bleeding controlled with Afrin/lidocaine soaked cotton with pressure for 5 minutes per ENT recs  - ENT ([facility]), recs appreciated  - c/w humidified O2 and Ocean spray    ARIELLA on CKD 3:  - baseline Cr appears to be ~2.0 - renally dose meds, no nephrotoxics  - Cr up to 2.6, but now improved while on lasix   - nephro ([facility]), recs appreciated    Anemia:  - pt with hx of MGUS and CKD likely contributing to anemia, along with current acute illness  - Hgb had trended down to 7.5, now improving and >8  - no gross hematuria, hematochezia, melena; monitor for signs of blood loss  - discussed blood transfusion with the pt who is hesitant as he is feeling overall better  - Retacrit per nephro orders for the renal failure component of anemia   - found to be iron deficient, so given venofer 100mg IV q24h x3 doses  - if pt agreeable to transfusion, will transfuse for goal Hgb > 8    Afib:  - c/w Eliquis  - c/w coreg  - cardio ([facility]), recs appreciated    Type 2 DM:  - goal glucose 100-180  - monitor FSG lazara as pt on decadron   - c/w HISS    psych - continue abilify, lamictal, effexor    DVT prophy   - c/w Eliquis     75yo M from group home, with PMH of DM2, HTN, HLD, CAD (s/p stents), Afib (on Eliquis), stage 3 CKD, hx of MGUS; a/w acute hypoxic and hypercarbic respiratory failure due to COVID-19 PNA, and acute on chronic diastolic CHF.    Acute hypoxic and hypercarbic respiratory failure due to COVID19 PNA and acute CHF:  -blood gas improved significantly   -completed remdesivir 5-day course  -continue dexamethasone 6mg daily - plan for 10-day course.  -has been on NC - now tolerated RA at rest currently - check with ambulation  -airborne/contact precautions  -trending CBC diff, CMP, and  inflammatory markers  -Pulm/ID following (Barb/Don), recs appreciated  -GI ppx with protonix while on decadron    Acute on chronic diastolic CHF:  - c/w lasix 40mg IV bid; strict I/Os; monitoring hemodynamics/renal function w/diuresis  - monitor O2, daily weights   - cardio ([facility]), recs appreciated    Hypertensive urgency:  - continue coreg, clonidine, norvasc, hydralazine, lasix  - BP still uncontrolled despite diuresis, increased clonidine to 0.3mg po BID  - also will increase coreg to 12.5mg po BID   - cardio ([facility]), recs appreciated  - monitor BP    Acute epistaxis:  - likely mucosal bleed from drying O2 and bled longer due to Eliquis, ASA, and elevated BUN  - bleeding controlled with Afrin/lidocaine soaked cotton with pressure for 5 minutes per ENT recs  - ENT ([facility]), recs appreciated  - c/w humidified O2 and Ocean spray    ARIELLA on CKD 3:  - baseline Cr appears to be ~2.0 - renally dose meds, no nephrotoxics  - Cr up to 2.6, but now improved while on lasix   - nephro ([facility]), recs appreciated    Anemia:  - pt with hx of MGUS and CKD likely contributing to anemia, along with current acute illness  - Hgb had trended down to 7.5, now improving and >8  - no gross hematuria, hematochezia, melena; monitor for signs of blood loss  - discussed blood transfusion with the pt who is hesitant as he is feeling overall better  - Retacrit per nephro orders for the renal failure component of anemia   - found to be iron deficient, so given venofer 100mg IV q24h x3 doses  - if pt agreeable to transfusion, will transfuse for goal Hgb > 8    Afib:  - c/w Eliquis  - c/w coreg  - cardio ([facility]), recs appreciated    Type 2 DM:  - goal glucose 100-180  - monitor FSG lazara as pt on decadron   - c/w HISS    psych - continue abilify, lamictal, effexor    DVT prophy   - c/w Eliquis    Disp: per SW, pt's group home cannot accept him if he is COVID positive -- will recheck PCR in AM

## 2022-01-09 NOTE — PROGRESS NOTE ADULT - ASSESSMENT
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 Found to be covid positive. Cardio consulted for elevated Troponins and CHF.    TTE:(10/21): Normal LV size with normal LVSF with LVEF 55-60%. LV diastolic function cannot determine due to atrial fibrillation. Mild mitral regurgitation is present. Mild aortic stenosis is  present. Mild tricuspid regurgitation is present . No evidence of any pulmonary hypertension    Volume overload (acute on chronic diastolic HF), CAD, HTN, Afib  - Still mildly overloaded, though , no O2 requirement, he is still symptomatic  - Continue IV Lasix BID for now and monitor renal indices.  Plan to transition to po 48 hours per Renal  - Continue to monitor strict I/O's, net neg 1.1 Liters   - Supplemental oxygen prn  - Troponin trended, remains elevated, likely demand ischemia in setting of above, plan for outpatient stress when underlying COVID  resolves however pt remains asymptomatic   - EKG NSR, no ischemic changes noted  - Hx of Afib continue home Eliquis.  rate-controlled  - For Hx of CAD, continue ASA, BB, and statin  - BP remains uncontrolled 140-190  - Continue Norvasc, Imdur, Hydralazine, Clonidine.  Increase Coreg with parameters  - Monitor and replete lytes, keep K>4, Mg>2.  - Recommend compression stockings for chronic BLE edema    - Will continue to follow.    Shilpa Kirkpatrick DNP, NP-C  Cardiology   Spectra #[phone]

## 2022-01-09 NOTE — PROGRESS NOTE ADULT - SUBJECTIVE AND OBJECTIVE BOX
Subjective: off BiPAP. on NC O2. No overt dyspnea.       MEDICATIONS  (STANDING):  amLODIPine   Tablet 10 milliGRAM(s) Oral daily  apixaban 5 milliGRAM(s) Oral two times a day  ARIPiprazole 30 milliGRAM(s) Oral daily  aspirin enteric coated 81 milliGRAM(s) Oral daily  atorvastatin 10 milliGRAM(s) Oral at bedtime  budesonide 160 MICROgram(s)/formoterol 4.5 MICROgram(s) Inhaler 2 Puff(s) Inhalation two times a day  carvedilol 6.25 milliGRAM(s) Oral every 12 hours  cefTRIAXone   IVPB 1000 milliGRAM(s) IV Intermittent every 24 hours  cloNIDine 0.3 milliGRAM(s) Oral two times a day  cyanocobalamin 1000 MICROGram(s) Oral daily  dexAMETHasone     Tablet 6 milliGRAM(s) Oral daily  dextrose 40% Gel 15 Gram(s) Oral once  dextrose 5%. 1000 milliLiter(s) (50 mL/Hr) IV Continuous <Continuous>  dextrose 5%. 1000 milliLiter(s) (100 mL/Hr) IV Continuous <Continuous>  dextrose 50% Injectable 25 Gram(s) IV Push once  dextrose 50% Injectable 12.5 Gram(s) IV Push once  dextrose 50% Injectable 25 Gram(s) IV Push once  famotidine    Tablet 20 milliGRAM(s) Oral daily  folic acid 1 milliGRAM(s) Oral daily  furosemide   Injectable 40 milliGRAM(s) IV Push two times a day  glucagon  Injectable 1 milliGRAM(s) IntraMuscular once  hydrALAZINE 100 milliGRAM(s) Oral three times a day  insulin lispro (ADMELOG) corrective regimen sliding scale   SubCutaneous three times a day before meals  insulin lispro (ADMELOG) corrective regimen sliding scale   SubCutaneous at bedtime  isosorbide   mononitrate ER Tablet (IMDUR) 120 milliGRAM(s) Oral daily  lamoTRIgine 100 milliGRAM(s) Oral daily  mirtazapine 30 milliGRAM(s) Oral at bedtime  oxybutynin 5 milliGRAM(s) Oral two times a day  pantoprazole    Tablet 40 milliGRAM(s) Oral before breakfast  potassium chloride    Tablet ER 40 milliEquivalent(s) Oral once  sodium chloride 0.65% Nasal 1 Spray(s) Both Nostrils four times a day  venlafaxine XR. 150 milliGRAM(s) Oral daily    MEDICATIONS  (PRN):  acetaminophen     Tablet .. 650 milliGRAM(s) Oral every 6 hours PRN Temp greater or equal to 38.5C (101.3F), Moderate Pain (4 - 6)  ALBUTerol    90 MICROgram(s) HFA Inhaler 2 Puff(s) Inhalation every 6 hours PRN Shortness of Breath and/or Wheezing          T(C): 36.3 (01-09-22 @ 05:02), Max: 36.4 (01-08-22 @ 19:56)  HR: 64 (01-09-22 @ 05:02) (57 - 64)  BP: 162/98 (01-09-22 @ 05:02) (148/71 - 184/85)  RR: 17 (01-09-22 @ 05:02) (17 - 17)  SpO2: 92% (01-09-22 @ 05:02) (92% - 94%)  Wt(kg): --        I&O's Detail    08 Jan 2022 07:01  -  09 Jan 2022 07:00  --------------------------------------------------------  IN:    IV PiggyBack: 50 mL  Total IN: 50 mL    OUT:    Voided (mL): 1200 mL  Total OUT: 1200 mL    Total NET: -1150 mL               PHYSICAL EXAM:    GENERAL: NAD  NECK: Supple, no inc in JVP  CHEST/LUNG: dec BS  HEART: S1S2  ABDOMEN: Soft  EXTREMITIES:  pos edema      LABS:  CBC Full  -  ( 09 Jan 2022 09:49 )  WBC Count : 6.28 K/uL  RBC Count : 3.26 M/uL  Hemoglobin : 8.7 g/dL  Hematocrit : 27.9 %  Platelet Count - Automated : 249 K/uL  Mean Cell Volume : 85.6 fl  Mean Cell Hemoglobin : 26.7 pg  Mean Cell Hemoglobin Concentration : 31.2 gm/dL  Auto Neutrophil # : x  Auto Lymphocyte # : x  Auto Monocyte # : x  Auto Eosinophil # : x  Auto Basophil # : x  Auto Neutrophil % : x  Auto Lymphocyte % : x  Auto Monocyte % : x  Auto Eosinophil % : x  Auto Basophil % : x    01-09    140  |  103  |  44<H>  ----------------------------<  241<H>  3.2<L>   |  30  |  2.20<H>    Ca    8.8      09 Jan 2022 09:49  Phos  3.0     01-09  Mg     2.0     01-09    TPro  6.2  /  Alb  2.8<L>  /  TBili  0.4  /  DBili  x   /  AST  16  /  ALT  25  /  AlkPhos  60  01-09            ASSESSMENT:  1.  CKD stage 4 associated with DM and HTN  2.  Acute/chronic diastolic heart faillure  3.  COVID pneumonia  4.  Chronic fluid overload    PLAN:  Consider transitioning to maint oral diuretic next 48h  Supplement K with 40meq of KCL today.   Monitor daily BMP and fluid balance

## 2022-01-09 NOTE — PROGRESS NOTE ADULT - SUBJECTIVE AND OBJECTIVE BOX
Patient is a 76y old  Male who presents with a chief complaint of worsening SOB.          INTERVAL HPI/OVERNIGHT EVENTS: Pt states he feels improved with no SOB at rest. Pt reports VALDEZ, which is gradually improving. No further epistaxis from right nares. Pt denies CP, fever, chills, palpitations. Admits having some anxiety with prolonged hospitalization.    MEDICATIONS  (STANDING):  amLODIPine   Tablet 10 milliGRAM(s) Oral daily  apixaban 5 milliGRAM(s) Oral two times a day  ARIPiprazole 30 milliGRAM(s) Oral daily  aspirin enteric coated 81 milliGRAM(s) Oral daily  atorvastatin 10 milliGRAM(s) Oral at bedtime  budesonide 160 MICROgram(s)/formoterol 4.5 MICROgram(s) Inhaler 2 Puff(s) Inhalation two times a day  carvedilol 12.5 milliGRAM(s) Oral every 12 hours  cefTRIAXone   IVPB 1000 milliGRAM(s) IV Intermittent every 24 hours  cloNIDine 0.3 milliGRAM(s) Oral two times a day  cyanocobalamin 1000 MICROGram(s) Oral daily  dexAMETHasone     Tablet 6 milliGRAM(s) Oral daily  dextrose 40% Gel 15 Gram(s) Oral once  dextrose 5%. 1000 milliLiter(s) (50 mL/Hr) IV Continuous <Continuous>  dextrose 5%. 1000 milliLiter(s) (100 mL/Hr) IV Continuous <Continuous>  dextrose 50% Injectable 25 Gram(s) IV Push once  dextrose 50% Injectable 12.5 Gram(s) IV Push once  dextrose 50% Injectable 25 Gram(s) IV Push once  famotidine    Tablet 20 milliGRAM(s) Oral daily  folic acid 1 milliGRAM(s) Oral daily  furosemide   Injectable 40 milliGRAM(s) IV Push two times a day  glucagon  Injectable 1 milliGRAM(s) IntraMuscular once  hydrALAZINE 100 milliGRAM(s) Oral three times a day  insulin lispro (ADMELOG) corrective regimen sliding scale   SubCutaneous three times a day before meals  insulin lispro (ADMELOG) corrective regimen sliding scale   SubCutaneous at bedtime  isosorbide   mononitrate ER Tablet (IMDUR) 120 milliGRAM(s) Oral daily  lamoTRIgine 100 milliGRAM(s) Oral daily  mirtazapine 30 milliGRAM(s) Oral at bedtime  oxybutynin 5 milliGRAM(s) Oral two times a day  pantoprazole    Tablet 40 milliGRAM(s) Oral before breakfast  senna 2 Tablet(s) Oral at bedtime  sodium chloride 0.65% Nasal 1 Spray(s) Both Nostrils four times a day  venlafaxine XR. 150 milliGRAM(s) Oral daily    MEDICATIONS  (PRN):  acetaminophen     Tablet .. 650 milliGRAM(s) Oral every 6 hours PRN Temp greater or equal to 38.5C (101.3F), Moderate Pain (4 - 6)  ALBUTerol    90 MICROgram(s) HFA Inhaler 2 Puff(s) Inhalation every 6 hours PRN Shortness of Breath and/or Wheezing        Allergies    No Known Allergies    Intolerances        REVIEW OF SYSTEMS:  CONSTITUTIONAL: No fever or chills  HEENT:  No headache, no sore throat  RESPIRATORY: +cough, shortness of breath (resolved at rest) ; +VALDEZ (improving)  CARDIOVASCULAR: No chest pain, palpitations  GASTROINTESTINAL: No abd pain, nausea, vomiting, or diarrhea  GENITOURINARY: No dysuria, frequency, or hematuria  NEUROLOGICAL: no focal weakness or dizziness  MUSCULOSKELETAL: +leg swelling (improving) ; no myalgias   PSYCH: +anxiety    Vital Signs Last 24 Hrs  T(C): 36.3 (09 Jan 2022 11:58), Max: 36.4 (08 Jan 2022 19:56)  T(F): 97.3 (09 Jan 2022 11:58), Max: 97.6 (08 Jan 2022 19:56)  HR: 56 (09 Jan 2022 11:58) (56 - 64)  BP: 172/90 (09 Jan 2022 12:56) (148/71 - 196/81)  BP(mean): --  RR: 18 (09 Jan 2022 11:58) (17 - 18)  SpO2: 95% (09 Jan 2022 11:58) (92% - 95%)    PHYSICAL EXAM:  GENERAL: NAD at rest on NC  HEENT:  anicteric, moist mucous membranes  CHEST/LUNG:  decreased breath sounds b/l bases  HEART:  rrr, S1, S2, +murmur  ABDOMEN:  BS+, soft, nontender, nondistended  EXTREMITIES: + LE edema b/l, no cyanosis, or calf tenderness  NERVOUS SYSTEM: answers questions and follows commands appropriately    LABS:                                        8.7    6.28  )-----------( 249      ( 09 Jan 2022 09:49 )             27.9     CBC Full  -  ( 09 Jan 2022 09:49 )  WBC Count : 6.28 K/uL  Hemoglobin : 8.7 g/dL  Hematocrit : 27.9 %  Platelet Count - Automated : 249 K/uL  Mean Cell Volume : 85.6 fl  Mean Cell Hemoglobin : 26.7 pg  Mean Cell Hemoglobin Concentration : 31.2 gm/dL  Auto Neutrophil # : x  Auto Lymphocyte # : x  Auto Monocyte # : x  Auto Eosinophil # : x  Auto Basophil # : x  Auto Neutrophil % : x  Auto Lymphocyte % : x  Auto Monocyte % : x  Auto Eosinophil % : x  Auto Basophil % : x    01-09    140  |  103  |  44<H>  ----------------------------<  241<H>  3.2<L>   |  30  |  2.20<H>    Ca    8.8      09 Jan 2022 09:49  Phos  3.0     01-09  Mg     2.0     01-09    TPro  6.2  /  Alb  2.8<L>  /  TBili  0.4  /  DBili  x   /  AST  16  /  ALT  25  /  AlkPhos  60  01-09              Culture - Blood (collected 01-03-22 @ 16:15)  Source: .Blood Blood-Peripheral  Preliminary Report (01-04-22 @ 17:02):    No growth to date.    Culture - Blood (collected 01-03-22 @ 16:15)  Source: .Blood Blood-Peripheral  Preliminary Report (01-04-22 @ 17:02):    No growth to date.    Culture - Urine (collected 01-03-22 @ 16:12)  Source: Clean Catch Clean Catch (Midstream)  Final Report (01-04-22 @ 13:40):    No growth        RADIOLOGY & ADDITIONAL TESTS:    Personally reviewed.     Consultant(s) Notes Reviewed:  [x] YES  [ ] NO     Patient is a 76y old  Male who presents with a chief complaint of worsening SOB.           INTERVAL HPI/OVERNIGHT EVENTS: Pt states he feels improved with no SOB at rest. Pt reports VALDEZ, which is gradually improving. No further epistaxis from right nares. Pt denies CP, fever, chills, palpitations. Admits having some anxiety with prolonged hospitalization.    MEDICATIONS  (STANDING):  amLODIPine   Tablet 10 milliGRAM(s) Oral daily  apixaban 5 milliGRAM(s) Oral two times a day  ARIPiprazole 30 milliGRAM(s) Oral daily  aspirin enteric coated 81 milliGRAM(s) Oral daily  atorvastatin 10 milliGRAM(s) Oral at bedtime  budesonide 160 MICROgram(s)/formoterol 4.5 MICROgram(s) Inhaler 2 Puff(s) Inhalation two times a day  carvedilol 12.5 milliGRAM(s) Oral every 12 hours  cefTRIAXone   IVPB 1000 milliGRAM(s) IV Intermittent every 24 hours  cloNIDine 0.3 milliGRAM(s) Oral two times a day  cyanocobalamin 1000 MICROGram(s) Oral daily  dexAMETHasone     Tablet 6 milliGRAM(s) Oral daily  dextrose 40% Gel 15 Gram(s) Oral once  dextrose 5%. 1000 milliLiter(s) (50 mL/Hr) IV Continuous <Continuous>  dextrose 5%. 1000 milliLiter(s) (100 mL/Hr) IV Continuous <Continuous>  dextrose 50% Injectable 25 Gram(s) IV Push once  dextrose 50% Injectable 12.5 Gram(s) IV Push once  dextrose 50% Injectable 25 Gram(s) IV Push once  famotidine    Tablet 20 milliGRAM(s) Oral daily  folic acid 1 milliGRAM(s) Oral daily  furosemide   Injectable 40 milliGRAM(s) IV Push two times a day  glucagon  Injectable 1 milliGRAM(s) IntraMuscular once  hydrALAZINE 100 milliGRAM(s) Oral three times a day  insulin lispro (ADMELOG) corrective regimen sliding scale   SubCutaneous three times a day before meals  insulin lispro (ADMELOG) corrective regimen sliding scale   SubCutaneous at bedtime  isosorbide   mononitrate ER Tablet (IMDUR) 120 milliGRAM(s) Oral daily  lamoTRIgine 100 milliGRAM(s) Oral daily  mirtazapine 30 milliGRAM(s) Oral at bedtime  oxybutynin 5 milliGRAM(s) Oral two times a day  pantoprazole    Tablet 40 milliGRAM(s) Oral before breakfast  senna 2 Tablet(s) Oral at bedtime  sodium chloride 0.65% Nasal 1 Spray(s) Both Nostrils four times a day  venlafaxine XR. 150 milliGRAM(s) Oral daily    MEDICATIONS  (PRN):  acetaminophen     Tablet .. 650 milliGRAM(s) Oral every 6 hours PRN Temp greater or equal to 38.5C (101.3F), Moderate Pain (4 - 6)  ALBUTerol    90 MICROgram(s) HFA Inhaler 2 Puff(s) Inhalation every 6 hours PRN Shortness of Breath and/or Wheezing        Allergies    No Known Allergies    Intolerances        REVIEW OF SYSTEMS:  CONSTITUTIONAL: No fever or chills  HEENT:  No headache, no sore throat  RESPIRATORY: +cough, shortness of breath (resolved at rest) ; +VALDEZ (improving)  CARDIOVASCULAR: No chest pain, palpitations  GASTROINTESTINAL: No abd pain, nausea, vomiting, or diarrhea  GENITOURINARY: No dysuria, frequency, or hematuria  NEUROLOGICAL: no focal weakness or dizziness  MUSCULOSKELETAL: +leg swelling (improving) ; no myalgias   PSYCH: +anxiety    Vital Signs Last 24 Hrs  T(C): 36.3 (09 Jan 2022 11:58), Max: 36.4 (08 Jan 2022 19:56)  T(F): 97.3 (09 Jan 2022 11:58), Max: 97.6 (08 Jan 2022 19:56)  HR: 56 (09 Jan 2022 11:58) (56 - 64)  BP: 172/90 (09 Jan 2022 12:56) (148/71 - 196/81)  BP(mean): --  RR: 18 (09 Jan 2022 11:58) (17 - 18)  SpO2: 95% (09 Jan 2022 11:58) (92% - 95%)    PHYSICAL EXAM:  GENERAL: NAD at rest on NC  HEENT:  anicteric, moist mucous membranes  CHEST/LUNG:  decreased breath sounds b/l bases  HEART:  rrr, S1, S2, +murmur  ABDOMEN:  BS+, soft, nontender, nondistended  EXTREMITIES: + LE edema b/l, no cyanosis, or calf tenderness  NERVOUS SYSTEM: answers questions and follows commands appropriately    LABS:                                        8.7    6.28  )-----------( 249      ( 09 Jan 2022 09:49 )             27.9     CBC Full  -  ( 09 Jan 2022 09:49 )  WBC Count : 6.28 K/uL  Hemoglobin : 8.7 g/dL  Hematocrit : 27.9 %  Platelet Count - Automated : 249 K/uL  Mean Cell Volume : 85.6 fl  Mean Cell Hemoglobin : 26.7 pg  Mean Cell Hemoglobin Concentration : 31.2 gm/dL  Auto Neutrophil # : x  Auto Lymphocyte # : x  Auto Monocyte # : x  Auto Eosinophil # : x  Auto Basophil # : x  Auto Neutrophil % : x  Auto Lymphocyte % : x  Auto Monocyte % : x  Auto Eosinophil % : x  Auto Basophil % : x    01-09    140  |  103  |  44<H>  ----------------------------<  241<H>  3.2<L>   |  30  |  2.20<H>    Ca    8.8      09 Jan 2022 09:49  Phos  3.0     01-09  Mg     2.0     01-09    TPro  6.2  /  Alb  2.8<L>  /  TBili  0.4  /  DBili  x   /  AST  16  /  ALT  25  /  AlkPhos  60  01-09              Culture - Blood (collected 01-03-22 @ 16:15)  Source: .Blood Blood-Peripheral  Preliminary Report (01-04-22 @ 17:02):    No growth to date.    Culture - Blood (collected 01-03-22 @ 16:15)  Source: .Blood Blood-Peripheral  Preliminary Report (01-04-22 @ 17:02):    No growth to date.    Culture - Urine (collected 01-03-22 @ 16:12)  Source: Clean Catch Clean Catch (Midstream)  Final Report (01-04-22 @ 13:40):    No growth        RADIOLOGY & ADDITIONAL TESTS:    Personally reviewed.     Consultant(s) Notes Reviewed:  [x] YES  [ ] NO

## 2022-01-09 NOTE — DIETITIAN INITIAL EVALUATION ADULT. - WEIGHT FOR BMI (KG)
History     Chief Complaint   Patient presents with   • Trauma   • Head Injury Without LOC     HPI  Derrell Angelo is a 74 year old male who presents to the ED after a head injury.  Patient is on Pradaxa for A. fib, and has history of ITP.  This morning patient slipped on ice and fell backwards hitting the back of his head on a retaining wall.  He is unsure whether or not there was LOC but states that there was it was very short.  He sustained a laceration to the back of the head.  He complains of mild pain to the back of the head.  Denies significant neck or back pain.  Denies numbness or weakness in the arms or legs.  No vision changes.  No nausea or vomiting.  No arm or leg injury or pain.  No other complaints.    Pankaj Antony MD    (Not in a hospital admission)      ALLERGIES:  Hydrocodone-acetaminophen    PMH: Reviewed as in patient chart  Social Hx: Reviewed as in patient chart  Family Hx: Non-contributory    Past Medical History:   Diagnosis Date   • Atrial flutter (CMS/HCC)    • Bartonella infection 07/01/2017   • Cardiac pacemaker in situ     keya- shilpa   • Chest pain     on 1/8/2022 states none in last year   • Early onset Alzheimer's dementia (CMS/HCC)    • Early onset Alzheimer's dementia (CMS/HCC)    • Epilepsy (CMS/HCC)     UNSPECIFIED EPILEPSY WITHOUT MENTION OF INTRACTABLE EPILEPSY-->follows with Neurology- last seizure was in about 2020   • Idiopathic thrombocytopenic purpura (ITP) (CMS/HCC)    • Paroxysmal atrial fibrillation (CMS/HCC)     >rhythm control on Tikosyn by Dr. Hopper   • Shortness of breath     < 4 mets   • Sleep apnea     cpap    • Tachy-mary syndrome (CMS/HCC)      s/p ppm with lead revision 2010       Past Surgical History:   Procedure Laterality Date   • Anesth,knee arthroscopy     • Anesth,pacemaker insertion        2/2010,  /[employer] MODEL #ADDR01    • Appendectomy     • Cardiac pacemaker lead placement      2/2010   • Colectomy      2014 volvulous with partial  colectomy    • Hernia repair      BILATERAL    • Other surgical history      2/2010 ATRIAL & VENTRICULAR LEAD REVISION        Family History   Problem Relation Age of Onset   • Seizure Disorder Mother    • Congestive Heart Failure Father        Social History     Tobacco Use   • Smoking status: Never Smoker   • Smokeless tobacco: Never Used   Substance Use Topics   • Alcohol use: Never   • Drug use: Never       Review of Systems  General: No fever.  Skin: No rash. No diaphoresis.  Eyes: No vision changes.  ENT: No sore throat or congestion.  Neck: No neck pain or stiffness.  Respiratory: No shortness of breath. No cough.  Cardiac: No chest pain.  Gastrointestinal: No nausea, vomiting, diarrhea. No abdominal pain.  Urinary: No dysuria.  Musculoskeletal: No pain or injury. No myalgias/arthralgias.  Neurologic: Head injury, headache.  No unilateral numbness, tingling or weakness.    Physical Exam     ED Triage Vitals [01/09/22 1208]   ED Triage Vitals Group      Temp 97.2 °F (36.2 °C)      Heart Rate 65      Resp 16      /78      SpO2 99 %      EtCO2 mmHg       Height 6' 2\" (1.88 m)      Weight 202 lb 6.1 oz (91.8 kg)      Weight Scale Used Standing scale      BMI (Calculated) 25.98      IBW/kg (Calculated) 82.2        Visit Vitals  /73   Pulse 65   Temp 97.2 °F (36.2 °C)   Resp 13   Ht 6' 2\" (1.88 m)   Wt 91.8 kg (202 lb 6.1 oz)   SpO2 98%   BMI 25.98 kg/m²        Pulse Oximetry: > 95% on room air which is normal my interpretation    General Appearance: Awake, alert, no acute distress  Skin: No rash or diaphoresis.  HEENT: Normocephalic, 5 cm laceration to the posterior scalp, minimal bleeding, PERRL, EOMI, sclera anicteric, mucous membranes moist  Neck: Normal range of motion, non-tender, no LAD.  Chest and Lungs: Bilateral breath sounds clear to auscultation, no wheezes rales, rhonchi or stridor.  Cardiovascular: Regular rate and rhythm, no murmurs or rubs.  Abdomen: Soft, non-tender,  non-distended.  Back: Normal, non-tender.  Musculoskeletal: No injuries. No edema.  Neurologic: Awake, alert, oriented x3, no dysarthria, aphasia, or facial droop, moving all extremities equally, no obvious deficits.  Psychiatric: Cooperative. Normal affect.    ED Course   Procedures    Laceration Repair Procedure  The wound was carefully explored, and no foreign bodies were noted.    The wound was thoroughly irrigated with normal saline  Preparation: Sterile field  Skin closure     Anesthetic      [x] Lidocaine      [] Lidocaine with epinephrine           Other:  The wound was carefully closed in:      [x] A Single layer      [] Multiple layers  Using      [ 8  ]  Staples    Post procedure exam Circ, motor, sensory intact with satisfactory wound closure and hemostasis  No complications  Patient tolerated well        RESULTS  Results for orders placed or performed during the hospital encounter of 01/09/22   CBC with Automated Differential (performable only)   Result Value    WBC 5.5    RBC 4.26 (L)    HGB 14.0    HCT 43.6    .3 (H)    MCH 32.9    MCHC 32.1    RDW-CV 12.4    RDW-SD 46.9    PLT 91 (L)    NRBC 0    Neutrophil, Percent 55    Lymphocytes, Percent 28    Mono, Percent 15    Eosinophils, Percent 1    Basophils, Percent 0    Immature Granulocytes 1    Absolute Neutrophils 3.0    Absolute Lymphocytes 1.6    Absolute Monocytes 0.8    Absolute Eosinophils  0.1    Absolute Basophils 0.0    Absolute Immmature Granulocytes 0.0   Manual Differential   Result Value    RBC Morphology Normal    Platelet Morphology Normal       CT HEAD WO CONTRAST   Final Result       There is a stable appearance of the brain compared to the previous   examination more than 4 years earlier without acute pathology identified. I   suspect mild cortical atrophy commensurate with age.      Electronically Signed by: KATYA NARVAEZ MD    Signed on: 1/9/2022 1:36 PM          CT CERVICAL SPINE WO CONTRAST   Final Result   No acute  pathology identified in the cervical spine. Prominent   degenerative disc changes are noted at C5-6 and C6-7.      Electronically Signed by: KATYA NARVAEZ MD    Signed on: 1/9/2022 1:39 PM          CT HEAD WO CONTRAST    (Results Pending)       ED COURSE       MDM: CT head and C-spine are negative for acute findings.  CBC obtained, platelets are 91, patient has known history of ITP.  Patient is also on Pradaxa.  At this time patient is awake and alert, at baseline mental status.  The scalp wound was repaired, see procedure note above.  Boostrix ordered.    This patient presents after a significant head injury with possible LOC, on Pradaxa and with thrombocytopenia, therefore patient is at risk for delayed bleed.  I spoke with trauma surgery Dr. Vargas who agrees patient would benefit from hospital placement for observation, neurochecks, close monitoring, repeat CT.  I spoke with Dr. Antony who agrees to admit the patient for observation.      CLINICAL IMPRESSION: 08/01/20 6:01 AM   ED Diagnosis   1. Closed head injury, initial encounter     2. Laceration of scalp, initial encounter     3. Thrombocytopenia (CMS/[facility])                Prakash Chowdhury MD  01/09/22 5721     105.2

## 2022-01-09 NOTE — PROGRESS NOTE ADULT - SUBJECTIVE AND OBJECTIVE BOX
Date/Time Patient Seen:  		  Referring MD:   Data Reviewed	       Patient is a 76y old  Male who presents with a chief complaint of CHF exacerbation (08 Jan 2022 16:20)      Subjective/HPI     PAST MEDICAL & SURGICAL HISTORY:  Hypertension    Diabetes mellitus    Lupus    Hepatitis C    Anxiety and depression    CAD (coronary artery disease)  s/p stents    Diverticulosis    Hyperlipidemia    HTN (hypertension)  c/b multiple episodes of hypertensive urgency    HLD (hyperlipidemia)    Atrial fibrillation  first documented on EKG 10/7/2021    CAD (coronary artery disease)  s/p stents (not on plavix)    Type 2 diabetes mellitus  not on home insulin/ Meds    Anxiety  Depression  multiple psych medications    History of diverticulitis  07/2021    Diverticulosis  c/b GIB in 2020    Afib  on AC    Stage 3 chronic kidney disease    Anemia of chronic disease  Monoclonal Gammopathy-MGUS  pRBC transfusion 10/15/21    Chronic diastolic congestive heart failure    Multiple thyroid nodules    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  Had surgery ( April 2013 )    S/P tonsillectomy    S/P arthroscopic knee surgery  Bilateral ( 2005 )    Torsion of testicle  Had surgery at age 14    Pilonidal cyst  Had surgery ( 1969 )    S/P cataract surgery  Bilateral    H/O hernia repair          Medication list         MEDICATIONS  (STANDING):  amLODIPine   Tablet 10 milliGRAM(s) Oral daily  apixaban 5 milliGRAM(s) Oral two times a day  ARIPiprazole 30 milliGRAM(s) Oral daily  aspirin enteric coated 81 milliGRAM(s) Oral daily  atorvastatin 10 milliGRAM(s) Oral at bedtime  budesonide 160 MICROgram(s)/formoterol 4.5 MICROgram(s) Inhaler 2 Puff(s) Inhalation two times a day  carvedilol 6.25 milliGRAM(s) Oral every 12 hours  cefTRIAXone   IVPB 1000 milliGRAM(s) IV Intermittent every 24 hours  cloNIDine 0.3 milliGRAM(s) Oral two times a day  cyanocobalamin 1000 MICROGram(s) Oral daily  dexAMETHasone     Tablet 6 milliGRAM(s) Oral daily  dextrose 40% Gel 15 Gram(s) Oral once  dextrose 5%. 1000 milliLiter(s) (50 mL/Hr) IV Continuous <Continuous>  dextrose 5%. 1000 milliLiter(s) (100 mL/Hr) IV Continuous <Continuous>  dextrose 50% Injectable 12.5 Gram(s) IV Push once  dextrose 50% Injectable 25 Gram(s) IV Push once  dextrose 50% Injectable 25 Gram(s) IV Push once  famotidine    Tablet 20 milliGRAM(s) Oral daily  folic acid 1 milliGRAM(s) Oral daily  furosemide   Injectable 40 milliGRAM(s) IV Push two times a day  glucagon  Injectable 1 milliGRAM(s) IntraMuscular once  hydrALAZINE 100 milliGRAM(s) Oral three times a day  insulin lispro (ADMELOG) corrective regimen sliding scale   SubCutaneous three times a day before meals  insulin lispro (ADMELOG) corrective regimen sliding scale   SubCutaneous at bedtime  isosorbide   mononitrate ER Tablet (IMDUR) 120 milliGRAM(s) Oral daily  lamoTRIgine 100 milliGRAM(s) Oral daily  mirtazapine 30 milliGRAM(s) Oral at bedtime  oxybutynin 5 milliGRAM(s) Oral two times a day  pantoprazole    Tablet 40 milliGRAM(s) Oral before breakfast  sodium chloride 0.65% Nasal 1 Spray(s) Both Nostrils four times a day  venlafaxine XR. 150 milliGRAM(s) Oral daily    MEDICATIONS  (PRN):  acetaminophen     Tablet .. 650 milliGRAM(s) Oral every 6 hours PRN Temp greater or equal to 38.5C (101.3F), Moderate Pain (4 - 6)  ALBUTerol    90 MICROgram(s) HFA Inhaler 2 Puff(s) Inhalation every 6 hours PRN Shortness of Breath and/or Wheezing         Vitals log        ICU Vital Signs Last 24 Hrs  T(C): 36.3 (09 Jan 2022 05:02), Max: 36.5 (08 Jan 2022 11:14)  T(F): 97.3 (09 Jan 2022 05:02), Max: 97.7 (08 Jan 2022 11:14)  HR: 64 (09 Jan 2022 05:02) (56 - 64)  BP: 162/98 (09 Jan 2022 05:02) (148/71 - 184/85)  BP(mean): --  ABP: --  ABP(mean): --  RR: 17 (09 Jan 2022 05:02) (16 - 17)  SpO2: 92% (09 Jan 2022 05:02) (92% - 96%)           Input and Output:  I&O's Detail    07 Jan 2022 07:01  -  08 Jan 2022 07:00  --------------------------------------------------------  IN:  Total IN: 0 mL    OUT:    Voided (mL): 1000 mL  Total OUT: 1000 mL    Total NET: -1000 mL      08 Jan 2022 07:01  -  09 Jan 2022 06:30  --------------------------------------------------------  IN:    IV PiggyBack: 50 mL  Total IN: 50 mL    OUT:    Voided (mL): 1200 mL  Total OUT: 1200 mL    Total NET: -1150 mL          Lab Data                        8.3    5.60  )-----------( 236      ( 08 Jan 2022 10:31 )             26.2     01-08    143  |  107  |  44<H>  ----------------------------<  140<H>  3.4<L>   |  28  |  2.10<H>    Ca    7.9<L>      08 Jan 2022 10:31  Mg     2.5     01-07    TPro  5.9<L>  /  Alb  2.6<L>  /  TBili  0.2  /  DBili  x   /  AST  15  /  ALT  25  /  AlkPhos  56  01-08            Review of Systems	      Objective     Physical Examination    heart s1s2  lung dc BS  abd soft  head nc      Pertinent Lab findings & Imaging      Nikko:  NO   Adequate UO     I&O's Detail    07 Jan 2022 07:01  -  08 Jan 2022 07:00  --------------------------------------------------------  IN:  Total IN: 0 mL    OUT:    Voided (mL): 1000 mL  Total OUT: 1000 mL    Total NET: -1000 mL      08 Jan 2022 07:01  -  09 Jan 2022 06:30  --------------------------------------------------------  IN:    IV PiggyBack: 50 mL  Total IN: 50 mL    OUT:    Voided (mL): 1200 mL  Total OUT: 1200 mL    Total NET: -1150 mL               Discussed with:     Cultures:	        Radiology

## 2022-01-09 NOTE — PROGRESS NOTE ADULT - ASSESSMENT
75 yo M PMH of A fib (on Eliquis) , MGUS s/p PRBC transfusion 10/21, anxiety/depression, CAD s/p stents (not on plavix), CHF, Diverticulosis, HLD, HTN, thyroid nodules, CKD stage 3, DM presents to the ED with SOB    on o2 support  use NIPPV as tolerated  VBG repeat noted  vs noted  ENT eval noted - epistaxis - pt is on eliquis - on nasal saline now -   remains on ABX - legionella ag neg    Previous echo 10/21 showed: Normal LV size with normal LV systolic function with estimated LVEF 55-60%. LV diastolic function cannot determine due to atrial fibrillation  Obesity - exam and risk factors - and features - c/w ELMER - outpatient eval  AF - on AC -   D dimer noted - serial D dimer  cvs rx regimen - diuresis - cxr and proBNP c/w Vol Overload - Cardio eval in progress - old records and TTE reviewed - I and O, serial CE -   dietary discretion  Covid - in vaccinated pt - monitor VS and Sat - Acap and Robitussin PRN - monitor VS and HD and Sat - Decadron is indicated in covid with hypoxemia  pt is on Home Rx regimen of Symbicort - unclear indication - ex smoker - no history of COPD as per pt or medical record - prior CT without Emphysema  isolation for covid -

## 2022-01-09 NOTE — PROGRESS NOTE ADULT - SUBJECTIVE AND OBJECTIVE BOX
[facility] -- Fifi Bliss, Kalpesh Valdovinos, Abraham Sheridan Savella, [place]  Office # [phone]    Follow Up:  CAD s/p stents, Elevated Trops, Volume overload    Subjective/Observations: Sitting up at edge of bed, comfortable on NC.  Admits to non-productive cough.  Admits to be ambulating and felt winded.  Denies chest pain    REVIEW OF SYSTEMS: All other review of systems is negative unless indicated above  PAST MEDICAL & SURGICAL HISTORY:  HTN (hypertension)  c/b multiple episodes of hypertensive urgency    HLD (hyperlipidemia)    Atrial fibrillation  first documented on EKG 10/7/2021    CAD (coronary artery disease)  s/p stents (not on plavix)    Type 2 diabetes mellitus  not on home insulin/ Meds    Anxiety  Depression  multiple psych medications    History of diverticulitis  07/2021    Diverticulosis  c/b GIB in 2020    Afib  on AC    Stage 3 chronic kidney disease    Anemia of chronic disease  Monoclonal Gammopathy-MGUS  pRBC transfusion 10/15/21    Chronic diastolic congestive heart failure    Multiple thyroid nodules    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  Had surgery ( April 2013 )    S/P tonsillectomy    S/P arthroscopic knee surgery  Bilateral ( 2005 )    Torsion of testicle  Had surgery at age 14    Pilonidal cyst  Had surgery ( 1969 )    S/P cataract surgery  Bilateral    H/O hernia repair    MEDICATIONS  (STANDING):  amLODIPine   Tablet 10 milliGRAM(s) Oral daily  apixaban 5 milliGRAM(s) Oral two times a day  ARIPiprazole 30 milliGRAM(s) Oral daily  aspirin enteric coated 81 milliGRAM(s) Oral daily  atorvastatin 10 milliGRAM(s) Oral at bedtime  budesonide 160 MICROgram(s)/formoterol 4.5 MICROgram(s) Inhaler 2 Puff(s) Inhalation two times a day  carvedilol 6.25 milliGRAM(s) Oral every 12 hours  cefTRIAXone   IVPB 1000 milliGRAM(s) IV Intermittent every 24 hours  cloNIDine 0.3 milliGRAM(s) Oral two times a day  cyanocobalamin 1000 MICROGram(s) Oral daily  dexAMETHasone     Tablet 6 milliGRAM(s) Oral daily  dextrose 40% Gel 15 Gram(s) Oral once  dextrose 5%. 1000 milliLiter(s) (50 mL/Hr) IV Continuous <Continuous>  dextrose 5%. 1000 milliLiter(s) (100 mL/Hr) IV Continuous <Continuous>  dextrose 50% Injectable 25 Gram(s) IV Push once  dextrose 50% Injectable 12.5 Gram(s) IV Push once  dextrose 50% Injectable 25 Gram(s) IV Push once  famotidine    Tablet 20 milliGRAM(s) Oral daily  folic acid 1 milliGRAM(s) Oral daily  furosemide   Injectable 40 milliGRAM(s) IV Push two times a day  glucagon  Injectable 1 milliGRAM(s) IntraMuscular once  hydrALAZINE 100 milliGRAM(s) Oral three times a day  insulin lispro (ADMELOG) corrective regimen sliding scale   SubCutaneous at bedtime  insulin lispro (ADMELOG) corrective regimen sliding scale   SubCutaneous three times a day before meals  isosorbide   mononitrate ER Tablet (IMDUR) 120 milliGRAM(s) Oral daily  lamoTRIgine 100 milliGRAM(s) Oral daily  mirtazapine 30 milliGRAM(s) Oral at bedtime  oxybutynin 5 milliGRAM(s) Oral two times a day  pantoprazole    Tablet 40 milliGRAM(s) Oral before breakfast  sodium chloride 0.65% Nasal 1 Spray(s) Both Nostrils four times a day  venlafaxine XR. 150 milliGRAM(s) Oral daily    MEDICATIONS  (PRN):  acetaminophen     Tablet .. 650 milliGRAM(s) Oral every 6 hours PRN Temp greater or equal to 38.5C (101.3F), Moderate Pain (4 - 6)  ALBUTerol    90 MICROgram(s) HFA Inhaler 2 Puff(s) Inhalation every 6 hours PRN Shortness of Breath and/or Wheezing    Allergies    No Known Allergies    Intolerances    Vital Signs Last 24 Hrs  T(C): 36.3 (09 Jan 2022 11:58), Max: 36.4 (08 Jan 2022 19:56)  T(F): 97.3 (09 Jan 2022 11:58), Max: 97.6 (08 Jan 2022 19:56)  HR: 56 (09 Jan 2022 11:58) (56 - 64)  BP: 172/90 (09 Jan 2022 12:56) (148/71 - 196/81)  BP(mean): --  RR: 18 (09 Jan 2022 11:58) (17 - 18)  SpO2: 95% (09 Jan 2022 11:58) (92% - 95%)  I&O's Summary    08 Jan 2022 07:01  -  09 Jan 2022 07:00  --------------------------------------------------------  IN: 50 mL / OUT: 1200 mL / NET: -1150 mL    09 Jan 2022 07:01  -  09 Jan 2022 16:28  --------------------------------------------------------  IN: 50 mL / OUT: 0 mL / NET: 50 mL   PHYSICAL EXAM:  TELE: Not on tele  Constitutional: NAD, awake and alert, obese  HEENT: Moist Mucous Membranes, Anicteric  Pulmonary: Non-labored, breath sounds are clear bilaterally, No wheezing, rales or rhonchi  Cardiovascular: Regular, S1 and S2, No murmurs, rubs, gallops or clicks  Gastrointestinal: Bowel Sounds present, soft, nontender.   Lymph: 2+ BLE edema. No lymphadenopathy.  Skin: No visible rashes or ulcers.  Psych:  Mood & affect appropriate  LABS: All Labs Reviewed:                        8.7    6.28  )-----------( 249      ( 09 Jan 2022 09:49 )             27.9                         8.3    5.60  )-----------( 236      ( 08 Jan 2022 10:31 )             26.2                         8.3    4.08  )-----------( 215      ( 07 Jan 2022 17:55 )             26.0     09 Jan 2022 09:49    140    |  103    |  44     ----------------------------<  241    3.2     |  30     |  2.20   08 Jan 2022 10:31    143    |  107    |  44     ----------------------------<  140    3.4     |  28     |  2.10   07 Jan 2022 09:46    140    |  107    |  48     ----------------------------<  190    3.4     |  24     |  2.30     Ca    8.8        09 Jan 2022 09:49  Ca    7.9        08 Jan 2022 10:31  Ca    8.1        07 Jan 2022 09:46  Phos  3.0       09 Jan 2022 09:49  Mg     2.0       09 Jan 2022 09:49  Mg     2.5       07 Jan 2022 09:46    TPro  6.2    /  Alb  2.8    /  TBili  0.4    /  DBili  x      /  AST  16     /  ALT  25     /  AlkPhos  60     09 Jan 2022 09:49  TPro  5.9    /  Alb  2.6    /  TBili  0.2    /  DBili  x      /  AST  15     /  ALT  25     /  AlkPhos  56     08 Jan 2022 10:31  TPro  6.2    /  Alb  2.7    /  TBili  0.3    /  DBili  x      /  AST  18     /  ALT  26     /  AlkPhos  55     07 Jan 2022 09:46       EXAM:  ECHO TTE WO CON COMP W DOPP         PROCEDURE DATE:  01/06/2022        INTERPRETATION:  INDICATION: Dyspnea  Sonographer PH    Blood Pressure 177/86    Height 172.7 cm     Weight 104.4 kg    Dimensions:  LA 4.2       Normal Values: 2.0 - 4.0 cm  Ao 3.7        Normal Values: 2.0 - 3.8 cm  SEPTUM 1.4       Normal Values: 0.6 - 1.2 cm  PWT 1.4       Normal Values: 0.6 - 1.1 cm  LVIDd 5.2         Normal Values: 3.0 - 5.6 cm  LVIDs 4.2         Normal Values: 1.8 - 4.0 cm      OBSERVATIONS:  Technically difficult study  Mitral Valve: Mitral annular calcification with thickened leaflets, mild   MR.  Aortic Valve/Aorta: Calcified trileaflet aortic valve with decreased   opening. Peak transaortic valve gradient is 29.4 mmHg with a mean   transaortic valve gradient 14.7 mmHg. This consistent with mild aortic   stenosis.  Tricuspid Valve: Mild TR.  Pulmonic Valve: Not well-visualized  Left Atrium: Enlarged  Right Atrium: Not well-visualized  Left Ventricle: Moderate left ventricular hypertrophy with normal   systolic function, estimated LVEF of 55%.  Right Ventricle: Grossly normal size and systolic function.  Pericardium: no significant pericardial effusion.  Pulmonary/RV Pressure: estimated PA systolic pressure of 32mmHg      IMPRESSION:  Technically difficult study  Moderate left ventricular hypertrophy with normal systolic function,   estimated LVEF of 55%.  Grossly normal RV size and systolic function.  Calcified trileaflet aortic valve with mild aortic stenosis  Mild MR and TR.  No significant pericardial effusion.    --- End of Report ---      VIET GREENWOOD MD; Attending Cardiologist  This document has been electronically signed. Jan 7 2022 11:19AM      ACC: [id number] EXAM:  XR CHEST PORTABLE IMMED 1V                          PROCEDURE DATE:  01/05/2022          INTERPRETATION:  DATE OF STUDY: 1/5/22    PRIOR:12/24/21    CLINICAL INDICATION: New fever.    TECHNIQUE: portable chest.    FINDINGS:  There is stable cardiomegaly.  The left lung is clear.  Partial clearing of previously noted lower right lung airspace haziness.  New airspace opacity in mid-right lung just above right minor fissure.   New airspace opacity in upper right lung -these findings are concerning   for pneumonia right clinical setting.  No sizable pleural effusion. No pneumothorax.  Degenerative changes of the thoracic spine.    IMPRESSION:  Clear left lung.  New probable pneumonic infiltrates in mid and upper right lung.    --- End of Report ---    JOHN MARADIAGA MD; Attending Radiologist  This document has been electronically signed. Jan 5 2022  9:44AM      Ventricular Rate 94 BPM    Atrial Rate 91 BPM    QRS Duration 100 ms    Q-T Interval 378 ms    QTC Calculation(Bazett) 472 ms    R Axis -15 degrees    T Axis 261 degrees    Diagnosis Line Atrial fibrillation  Incomplete right bundle branch block  Moderate voltage criteria for LVH, may be normal variant  Nonspecific T wave abnormality  Prolonged QT  Abnormal ECG  Confirmed by Bonifacio BARRERA, Victorino ([id number]) on 1/3/2022 3:17:52 PM

## 2022-01-10 LAB
ALBUMIN SERPL ELPH-MCNC: 3 G/DL — LOW (ref 3.3–5)
ALP SERPL-CCNC: 60 U/L — SIGNIFICANT CHANGE UP (ref 40–120)
ALT FLD-CCNC: 26 U/L — SIGNIFICANT CHANGE UP (ref 12–78)
ANION GAP SERPL CALC-SCNC: 8 MMOL/L — SIGNIFICANT CHANGE UP (ref 5–17)
AST SERPL-CCNC: 16 U/L — SIGNIFICANT CHANGE UP (ref 15–37)
BILIRUB SERPL-MCNC: 0.4 MG/DL — SIGNIFICANT CHANGE UP (ref 0.2–1.2)
BUN SERPL-MCNC: 42 MG/DL — HIGH (ref 7–23)
CALCIUM SERPL-MCNC: 8.6 MG/DL — SIGNIFICANT CHANGE UP (ref 8.5–10.1)
CHLORIDE SERPL-SCNC: 100 MMOL/L — SIGNIFICANT CHANGE UP (ref 96–108)
CO2 SERPL-SCNC: 33 MMOL/L — HIGH (ref 22–31)
CREAT SERPL-MCNC: 2.2 MG/DL — HIGH (ref 0.5–1.3)
CULTURE RESULTS: SIGNIFICANT CHANGE UP
CULTURE RESULTS: SIGNIFICANT CHANGE UP
GLUCOSE SERPL-MCNC: 180 MG/DL — HIGH (ref 70–99)
HCT VFR BLD CALC: 30.5 % — LOW (ref 39–50)
HGB BLD-MCNC: 9.6 G/DL — LOW (ref 13–17)
MCHC RBC-ENTMCNC: 27 PG — SIGNIFICANT CHANGE UP (ref 27–34)
MCHC RBC-ENTMCNC: 31.5 GM/DL — LOW (ref 32–36)
MCV RBC AUTO: 85.7 FL — SIGNIFICANT CHANGE UP (ref 80–100)
NRBC # BLD: 0 /100 WBCS — SIGNIFICANT CHANGE UP (ref 0–0)
PLATELET # BLD AUTO: 300 K/UL — SIGNIFICANT CHANGE UP (ref 150–400)
POTASSIUM SERPL-MCNC: 3.3 MMOL/L — LOW (ref 3.5–5.3)
POTASSIUM SERPL-SCNC: 3.3 MMOL/L — LOW (ref 3.5–5.3)
PROT SERPL-MCNC: 6.5 G/DL — SIGNIFICANT CHANGE UP (ref 6–8.3)
RBC # BLD: 3.56 M/UL — LOW (ref 4.2–5.8)
RBC # FLD: 17.3 % — HIGH (ref 10.3–14.5)
SARS-COV-2 RNA SPEC QL NAA+PROBE: DETECTED
SODIUM SERPL-SCNC: 141 MMOL/L — SIGNIFICANT CHANGE UP (ref 135–145)
SPECIMEN SOURCE: SIGNIFICANT CHANGE UP
SPECIMEN SOURCE: SIGNIFICANT CHANGE UP
WBC # BLD: 7.7 K/UL — SIGNIFICANT CHANGE UP (ref 3.8–10.5)
WBC # FLD AUTO: 7.7 K/UL — SIGNIFICANT CHANGE UP (ref 3.8–10.5)

## 2022-01-10 PROCEDURE — 99232 SBSQ HOSP IP/OBS MODERATE 35: CPT

## 2022-01-10 PROCEDURE — 99233 SBSQ HOSP IP/OBS HIGH 50: CPT

## 2022-01-10 RX ORDER — POTASSIUM CHLORIDE 20 MEQ
40 PACKET (EA) ORAL ONCE
Refills: 0 | Status: COMPLETED | OUTPATIENT
Start: 2022-01-10 | End: 2022-01-10

## 2022-01-10 RX ORDER — INSULIN LISPRO 100/ML
VIAL (ML) SUBCUTANEOUS
Refills: 0 | Status: DISCONTINUED | OUTPATIENT
Start: 2022-01-10 | End: 2022-01-18

## 2022-01-10 RX ORDER — POTASSIUM CHLORIDE 20 MEQ
20 PACKET (EA) ORAL ONCE
Refills: 0 | Status: COMPLETED | OUTPATIENT
Start: 2022-01-10 | End: 2022-01-10

## 2022-01-10 RX ORDER — POTASSIUM CHLORIDE 20 MEQ
20 PACKET (EA) ORAL ONCE
Refills: 0 | Status: DISCONTINUED | OUTPATIENT
Start: 2022-01-10 | End: 2022-01-10

## 2022-01-10 RX ADMIN — Medication 6 MILLIGRAM(S): at 06:04

## 2022-01-10 RX ADMIN — Medication 40 MILLIGRAM(S): at 17:31

## 2022-01-10 RX ADMIN — AMLODIPINE BESYLATE 10 MILLIGRAM(S): 2.5 TABLET ORAL at 06:04

## 2022-01-10 RX ADMIN — Medication 0.3 MILLIGRAM(S): at 06:04

## 2022-01-10 RX ADMIN — PREGABALIN 1000 MICROGRAM(S): 225 CAPSULE ORAL at 11:12

## 2022-01-10 RX ADMIN — BUDESONIDE AND FORMOTEROL FUMARATE DIHYDRATE 2 PUFF(S): 160; 4.5 AEROSOL RESPIRATORY (INHALATION) at 06:04

## 2022-01-10 RX ADMIN — APIXABAN 5 MILLIGRAM(S): 2.5 TABLET, FILM COATED ORAL at 19:29

## 2022-01-10 RX ADMIN — ISOSORBIDE MONONITRATE 120 MILLIGRAM(S): 60 TABLET, EXTENDED RELEASE ORAL at 11:12

## 2022-01-10 RX ADMIN — SENNA PLUS 2 TABLET(S): 8.6 TABLET ORAL at 21:46

## 2022-01-10 RX ADMIN — MIRTAZAPINE 30 MILLIGRAM(S): 45 TABLET, ORALLY DISINTEGRATING ORAL at 21:46

## 2022-01-10 RX ADMIN — PANTOPRAZOLE SODIUM 40 MILLIGRAM(S): 20 TABLET, DELAYED RELEASE ORAL at 06:04

## 2022-01-10 RX ADMIN — FAMOTIDINE 20 MILLIGRAM(S): 10 INJECTION INTRAVENOUS at 11:13

## 2022-01-10 RX ADMIN — Medication 100 MILLIGRAM(S): at 06:04

## 2022-01-10 RX ADMIN — CEFTRIAXONE 100 MILLIGRAM(S): 500 INJECTION, POWDER, FOR SOLUTION INTRAMUSCULAR; INTRAVENOUS at 13:35

## 2022-01-10 RX ADMIN — Medication 0.3 MILLIGRAM(S): at 17:31

## 2022-01-10 RX ADMIN — Medication 5 MILLIGRAM(S): at 06:04

## 2022-01-10 RX ADMIN — Medication 100 MILLIGRAM(S): at 21:46

## 2022-01-10 RX ADMIN — ATORVASTATIN CALCIUM 10 MILLIGRAM(S): 80 TABLET, FILM COATED ORAL at 21:46

## 2022-01-10 RX ADMIN — BUDESONIDE AND FORMOTEROL FUMARATE DIHYDRATE 2 PUFF(S): 160; 4.5 AEROSOL RESPIRATORY (INHALATION) at 19:29

## 2022-01-10 RX ADMIN — CARVEDILOL PHOSPHATE 12.5 MILLIGRAM(S): 80 CAPSULE, EXTENDED RELEASE ORAL at 17:31

## 2022-01-10 RX ADMIN — Medication 0.5 MILLIGRAM(S): at 15:14

## 2022-01-10 RX ADMIN — LAMOTRIGINE 100 MILLIGRAM(S): 25 TABLET, ORALLY DISINTEGRATING ORAL at 11:12

## 2022-01-10 RX ADMIN — ARIPIPRAZOLE 30 MILLIGRAM(S): 15 TABLET ORAL at 11:13

## 2022-01-10 RX ADMIN — Medication 5 MILLIGRAM(S): at 17:31

## 2022-01-10 RX ADMIN — Medication 1 MILLIGRAM(S): at 11:12

## 2022-01-10 RX ADMIN — Medication 1 SPRAY(S): at 06:04

## 2022-01-10 RX ADMIN — Medication 20 MILLIEQUIVALENT(S): at 15:17

## 2022-01-10 RX ADMIN — Medication 2: at 17:17

## 2022-01-10 RX ADMIN — Medication 100 MILLIGRAM(S): at 13:37

## 2022-01-10 RX ADMIN — Medication 40 MILLIGRAM(S): at 06:03

## 2022-01-10 RX ADMIN — Medication 40 MILLIEQUIVALENT(S): at 11:13

## 2022-01-10 RX ADMIN — APIXABAN 5 MILLIGRAM(S): 2.5 TABLET, FILM COATED ORAL at 06:04

## 2022-01-10 RX ADMIN — Medication 81 MILLIGRAM(S): at 11:12

## 2022-01-10 RX ADMIN — Medication 150 MILLIGRAM(S): at 11:12

## 2022-01-10 RX ADMIN — CARVEDILOL PHOSPHATE 12.5 MILLIGRAM(S): 80 CAPSULE, EXTENDED RELEASE ORAL at 06:04

## 2022-01-10 NOTE — PROGRESS NOTE ADULT - ASSESSMENT
CKD 4  Diabetes  CHF  Hypertension  h/o SLE  Hypokalemia  Anemia  COVID +, Pneumonia    Renal indices stable To continue current meds. On IV lasix. Retacrit for anemia. Monitor blood sugar levels. Insulin coverage as needed.   Monitor h/h trend. Potassium supplementation. Monitor BP trend. Titrate BP meds as needed. Salt restriction. On IV abx. ID follow up.   Will follow electrolytes and renal function trend. Avoid nephrotoxic meds as possible.

## 2022-01-10 NOTE — DIETITIAN INITIAL EVALUATION ADULT. - PROBLEM SELECTOR PLAN 3
CKD stage 3-4, Cr 2.3 poa baseline per chart review ~1.6-1.8  -s/p 1L LR x1  -Started 40mg IV Lasix BID as per Cardio recs  -Avoid nephrotoxic meds  -Dr Rodarte consulted

## 2022-01-10 NOTE — DIETITIAN INITIAL EVALUATION ADULT. - PERTINENT MEDS FT
MEDICATIONS  (STANDING):  amLODIPine   Tablet 10 milliGRAM(s) Oral daily  apixaban 5 milliGRAM(s) Oral two times a day  ARIPiprazole 30 milliGRAM(s) Oral daily  aspirin enteric coated 81 milliGRAM(s) Oral daily  atorvastatin 10 milliGRAM(s) Oral at bedtime  budesonide 160 MICROgram(s)/formoterol 4.5 MICROgram(s) Inhaler 2 Puff(s) Inhalation two times a day  carvedilol 12.5 milliGRAM(s) Oral every 12 hours  cefTRIAXone   IVPB 1000 milliGRAM(s) IV Intermittent every 24 hours  cloNIDine 0.3 milliGRAM(s) Oral two times a day  cyanocobalamin 1000 MICROGram(s) Oral daily  dexAMETHasone     Tablet 6 milliGRAM(s) Oral daily  dextrose 40% Gel 15 Gram(s) Oral once  dextrose 5%. 1000 milliLiter(s) (50 mL/Hr) IV Continuous <Continuous>  dextrose 5%. 1000 milliLiter(s) (100 mL/Hr) IV Continuous <Continuous>  dextrose 50% Injectable 25 Gram(s) IV Push once  dextrose 50% Injectable 12.5 Gram(s) IV Push once  dextrose 50% Injectable 25 Gram(s) IV Push once  famotidine    Tablet 20 milliGRAM(s) Oral daily  folic acid 1 milliGRAM(s) Oral daily  furosemide   Injectable 40 milliGRAM(s) IV Push two times a day  glucagon  Injectable 1 milliGRAM(s) IntraMuscular once  hydrALAZINE 100 milliGRAM(s) Oral three times a day  insulin lispro (ADMELOG) corrective regimen sliding scale   SubCutaneous three times a day before meals  insulin lispro (ADMELOG) corrective regimen sliding scale   SubCutaneous at bedtime  isosorbide   mononitrate ER Tablet (IMDUR) 120 milliGRAM(s) Oral daily  lamoTRIgine 100 milliGRAM(s) Oral daily  mirtazapine 30 milliGRAM(s) Oral at bedtime  oxybutynin 5 milliGRAM(s) Oral two times a day  pantoprazole    Tablet 40 milliGRAM(s) Oral before breakfast  senna 2 Tablet(s) Oral at bedtime  sodium chloride 0.65% Nasal 1 Spray(s) Both Nostrils four times a day  venlafaxine XR. 150 milliGRAM(s) Oral daily    MEDICATIONS  (PRN):  acetaminophen     Tablet .. 650 milliGRAM(s) Oral every 6 hours PRN Temp greater or equal to 38.5C (101.3F), Moderate Pain (4 - 6)  ALBUTerol    90 MICROgram(s) HFA Inhaler 2 Puff(s) Inhalation every 6 hours PRN Shortness of Breath and/or Wheezing

## 2022-01-10 NOTE — DIETITIAN INITIAL EVALUATION ADULT. - BODY MASS INDEX
pregnant.








anderson 

sign, no hemotympanum. No cervical spinal tenderness. 


MSK: Mild contact dermatitis noted on right forearm.  No other rash noted.  No 

posterior calf tenderness bilaterally, homans sign negative bilaterally. 

Posterior tibialis and radial pulse +2 bilaterally. Sensation intact in upper 

and lower extremities. Full active ROM in upper and lower extremities, 5/5 

stregnth. 








Limitations: no limitations





Course





                                   Vital Signs











  06/03/

pregnant.  Rivas. 











Adonis Escobar MD [Primary Care Provider] - 1-2 days


Chucky Petty MD [STAFF PHYSICIAN] - 1-2 days


Luis Morgan MD [STAFF PHYSICIAN] - 1-2 days


Tarah Artis MD [REFERRING] - 1-2 days


Tk Manuel MD [REFERRING] - 1-2 days


Nikki Petty MD [STAFF PHYSICIAN] - 1-2 days 37.9

## 2022-01-10 NOTE — DIETITIAN INITIAL EVALUATION ADULT. - PROBLEM SELECTOR PLAN 1
100% on 10 L/min NRB, likely 2/2 CHF exacerbation  Patient presents with fever, SOB likely 2/2 acute hypoxic respiratory failure 2/2  CHF exacerbation   - Admit to telemetry with continuous pulse ox   - Supplemental O2 prn maintain O2 sats >88%. Prone PRN  - Per ID, does not quality for remdesivir or decadron  - Monitor volume status closely, avoid aggressive hydration  - Tylenol prn myalgias, fever  - Avoid nebs. continue with MDI prn.  - CBC with diff and CMP QD. Ferritin, crp, d-dimer, procal at admission then will repeat If clinically worsening every 48-72 hours.  - Continue home Eliquis   - Isolation precautions per protocol  - Monitor fever curve, renal function  - ID (Dr. Ochoa) following, f/u recs  - Pulm consulted, f/u recs

## 2022-01-10 NOTE — PROGRESS NOTE ADULT - ASSESSMENT
77yo M from group home, with PMH of DM2, HTN, HLD, CAD (s/p stents), Afib (on Eliquis), stage 3 CKD, hx of MGUS; a/w acute hypoxic and hypercarbic respiratory failure due to COVID-19 PNA, and acute on chronic diastolic CHF.    Acute hypoxic and hypercarbic respiratory failure due to COVID19 PNA and acute CHF:  -blood gas improved significantly   -completed remdesivir 5-day course  -continue dexamethasone 6mg daily - plan for 10-day course.  -has been on NC - now tolerated RA at rest currently - check with ambulation  -airborne/contact precautions  -trending CBC diff, CMP, and  inflammatory markers  -Pulm/ID following ([facility]), recs appreciated  -GI ppx with protonix while on decadron    Acute on chronic diastolic CHF:  - c/w lasix 40mg IV bid; strict I/Os; monitoring hemodynamics/renal function w/diuresis  - might be able to transition to po lasix tomorrow  - monitor O2, daily weights   - cardio ([facility]), recs appreciated    Hypertensive urgency:  - continue coreg, clonidine, norvasc, hydralazine, lasix  - BP control improving after increasing clonidine to 0.3mg po BID and increasing coreg to 12.5mg po BID   - cardio ([facility]), recs appreciated  - monitor BP    Acute epistaxis:  - likely mucosal bleed from drying O2 and bled longer due to Eliquis, ASA, and elevated BUN  - bleeding controlled with Afrin/lidocaine soaked cotton with pressure for 5 minutes per ENT recs  - ENT ([facility]), recs appreciated  - c/w humidified O2 and Ocean spray    ARIELLA on CKD 3:  - baseline Cr appears to be ~2.0 - renally dose meds, no nephrotoxics  - Cr up to 2.6, but now improved while on lasix   - nephro ([facility]), recs appreciated    Anemia:  - pt with hx of MGUS and CKD likely contributing to anemia, along with current acute illness  - Hgb had trended down to 7.5, now improving and >8  - no gross hematuria, hematochezia, melena; monitor for signs of blood loss  - discussed blood transfusion with the pt who is hesitant as he is feeling overall better  - Retacrit per nephro orders for the renal failure component of anemia   - found to be iron deficient, so given venofer 100mg IV q24h x3 doses  - if pt agreeable to transfusion, will transfuse for goal Hgb > 8    Afib:  - c/w Eliquis  - c/w coreg  - cardio ([facility]), recs appreciated    Type 2 DM:  - goal glucose 100-180  - monitor FSG lazara as pt on decadron   - c/w HISS    psych - continue abilify, lamictal, effexor    DVT prophy   - c/w Eliquis    Disp: per SW, pt's group home cannot accept him if he is COVID positive -- f/up PCR

## 2022-01-10 NOTE — PROGRESS NOTE ADULT - SUBJECTIVE AND OBJECTIVE BOX
Patient is a 76y old  Male who presents with a chief complaint of worsening SOB.           INTERVAL HPI/OVERNIGHT EVENTS: Pt states he feels "good" with no SOB at rest. Pt reports mild VALDEZ, which is gradually improving. No further epistaxis from right nares. Pt denies CP, fever, chills, palpitations. Admits having some anxiety with prolonged hospitalization.    MEDICATIONS  (STANDING):  amLODIPine   Tablet 10 milliGRAM(s) Oral daily  apixaban 5 milliGRAM(s) Oral two times a day  ARIPiprazole 30 milliGRAM(s) Oral daily  aspirin enteric coated 81 milliGRAM(s) Oral daily  atorvastatin 10 milliGRAM(s) Oral at bedtime  budesonide 160 MICROgram(s)/formoterol 4.5 MICROgram(s) Inhaler 2 Puff(s) Inhalation two times a day  carvedilol 12.5 milliGRAM(s) Oral every 12 hours  cefTRIAXone   IVPB 1000 milliGRAM(s) IV Intermittent every 24 hours  cloNIDine 0.3 milliGRAM(s) Oral two times a day  cyanocobalamin 1000 MICROGram(s) Oral daily  dexAMETHasone     Tablet 6 milliGRAM(s) Oral daily  dextrose 40% Gel 15 Gram(s) Oral once  dextrose 5%. 1000 milliLiter(s) (50 mL/Hr) IV Continuous <Continuous>  dextrose 5%. 1000 milliLiter(s) (100 mL/Hr) IV Continuous <Continuous>  dextrose 50% Injectable 25 Gram(s) IV Push once  dextrose 50% Injectable 12.5 Gram(s) IV Push once  dextrose 50% Injectable 25 Gram(s) IV Push once  famotidine    Tablet 20 milliGRAM(s) Oral daily  folic acid 1 milliGRAM(s) Oral daily  furosemide   Injectable 40 milliGRAM(s) IV Push two times a day  glucagon  Injectable 1 milliGRAM(s) IntraMuscular once  hydrALAZINE 100 milliGRAM(s) Oral three times a day  insulin lispro (ADMELOG) corrective regimen sliding scale   SubCutaneous three times a day before meals  insulin lispro (ADMELOG) corrective regimen sliding scale   SubCutaneous at bedtime  isosorbide   mononitrate ER Tablet (IMDUR) 120 milliGRAM(s) Oral daily  lamoTRIgine 100 milliGRAM(s) Oral daily  mirtazapine 30 milliGRAM(s) Oral at bedtime  oxybutynin 5 milliGRAM(s) Oral two times a day  pantoprazole    Tablet 40 milliGRAM(s) Oral before breakfast  senna 2 Tablet(s) Oral at bedtime  sodium chloride 0.65% Nasal 1 Spray(s) Both Nostrils four times a day  venlafaxine XR. 150 milliGRAM(s) Oral daily    MEDICATIONS  (PRN):  acetaminophen     Tablet .. 650 milliGRAM(s) Oral every 6 hours PRN Temp greater or equal to 38.5C (101.3F), Moderate Pain (4 - 6)  ALBUTerol    90 MICROgram(s) HFA Inhaler 2 Puff(s) Inhalation every 6 hours PRN Shortness of Breath and/or Wheezing        Allergies    No Known Allergies    Intolerances        REVIEW OF SYSTEMS:  CONSTITUTIONAL: No fever or chills  HEENT:  No headache, no sore throat  RESPIRATORY: shortness of breath (resolved at rest) ; +VALDEZ (improving)  CARDIOVASCULAR: No chest pain, palpitations  GASTROINTESTINAL: No abd pain, nausea, vomiting, or diarrhea  GENITOURINARY: No dysuria, frequency, or hematuria  NEUROLOGICAL: no focal weakness or dizziness  MUSCULOSKELETAL: +leg swelling (improving) ; no myalgias   PSYCH: +anxiety    Vital Signs Last 24 Hrs  T(C): 36.4 (10 [place]co 2022 13:38), Max: 36.5 (09 Jan 2022 20:08)  T(F): 97.6 (10 [place]co 2022 13:38), Max: 97.7 (09 Jan 2022 20:08)  HR: 88 (10 [place]co 2022 17:28) (60 - 88)  BP: 153/95 (10 [place]co 2022 17:28) (137/84 - 172/87)  BP(mean): --  RR: 18 (10 Checo 2022 13:38) (18 - 18)  SpO2: 94% (10 Checo 2022 13:38) (94% - 96%)    PHYSICAL EXAM:  GENERAL: NAD at rest on NC  HEENT:  anicteric, moist mucous membranes  CHEST/LUNG:  decreased breath sounds b/l bases  HEART:  rrr, S1, S2, +murmur  ABDOMEN:  BS+, soft, nontender, nondistended  EXTREMITIES: + LE edema b/l, no cyanosis, or calf tenderness  NERVOUS SYSTEM: answers questions and follows commands appropriately    LABS:                                                   9.6    7.70  )-----------( 300      ( 10 Checo 2022 09:50 )             30.5     CBC Full  -  ( 10 Checo 2022 09:50 )  WBC Count : 7.70 K/uL  Hemoglobin : 9.6 g/dL  Hematocrit : 30.5 %  Platelet Count - Automated : 300 K/uL  Mean Cell Volume : 85.7 fl  Mean Cell Hemoglobin : 27.0 pg  Mean Cell Hemoglobin Concentration : 31.5 gm/dL  Auto Neutrophil # : x  Auto Lymphocyte # : x  Auto Monocyte # : x  Auto Eosinophil # : x  Auto Basophil # : x  Auto Neutrophil % : x  Auto Lymphocyte % : x  Auto Monocyte % : x  Auto Eosinophil % : x  Auto Basophil % : x    01-10    141  |  100  |  42<H>  ----------------------------<  180<H>  3.3<L>   |  33<H>  |  2.20<H>    Ca    8.6      10 [place]co 2022 09:50  Phos  3.0     01-09  Mg     2.0     01-09    TPro  6.5  /  Alb  3.0<L>  /  TBili  0.4  /  DBili  x   /  AST  16  /  ALT  26  /  AlkPhos  60  01-10                Culture - Blood (collected 01-03-22 @ 16:15)  Source: .Blood Blood-Peripheral  Preliminary Report (01-04-22 @ 17:02):    No growth to date.    Culture - Blood (collected 01-03-22 @ 16:15)  Source: .Blood Blood-Peripheral  Preliminary Report (01-04-22 @ 17:02):    No growth to date.    Culture - Urine (collected 01-03-22 @ 16:12)  Source: Clean Catch Clean Catch (Midstream)  Final Report (01-04-22 @ 13:40):    No growth        RADIOLOGY & ADDITIONAL TESTS:    Personally reviewed.     Consultant(s) Notes Reviewed:  [x] YES  [ ] NO     Patient is a 76y old  Male who presents with a chief complaint of worsening SOB.            INTERVAL HPI/OVERNIGHT EVENTS: Pt states he feels "good" with no SOB at rest. Pt reports mild VALDEZ, which is gradually improving. No further epistaxis from right nares. Pt denies CP, fever, chills, palpitations. Admits having some anxiety with prolonged hospitalization.    MEDICATIONS  (STANDING):  amLODIPine   Tablet 10 milliGRAM(s) Oral daily  apixaban 5 milliGRAM(s) Oral two times a day  ARIPiprazole 30 milliGRAM(s) Oral daily  aspirin enteric coated 81 milliGRAM(s) Oral daily  atorvastatin 10 milliGRAM(s) Oral at bedtime  budesonide 160 MICROgram(s)/formoterol 4.5 MICROgram(s) Inhaler 2 Puff(s) Inhalation two times a day  carvedilol 12.5 milliGRAM(s) Oral every 12 hours  cefTRIAXone   IVPB 1000 milliGRAM(s) IV Intermittent every 24 hours  cloNIDine 0.3 milliGRAM(s) Oral two times a day  cyanocobalamin 1000 MICROGram(s) Oral daily  dexAMETHasone     Tablet 6 milliGRAM(s) Oral daily  dextrose 40% Gel 15 Gram(s) Oral once  dextrose 5%. 1000 milliLiter(s) (50 mL/Hr) IV Continuous <Continuous>  dextrose 5%. 1000 milliLiter(s) (100 mL/Hr) IV Continuous <Continuous>  dextrose 50% Injectable 25 Gram(s) IV Push once  dextrose 50% Injectable 12.5 Gram(s) IV Push once  dextrose 50% Injectable 25 Gram(s) IV Push once  famotidine    Tablet 20 milliGRAM(s) Oral daily  folic acid 1 milliGRAM(s) Oral daily  furosemide   Injectable 40 milliGRAM(s) IV Push two times a day  glucagon  Injectable 1 milliGRAM(s) IntraMuscular once  hydrALAZINE 100 milliGRAM(s) Oral three times a day  insulin lispro (ADMELOG) corrective regimen sliding scale   SubCutaneous three times a day before meals  insulin lispro (ADMELOG) corrective regimen sliding scale   SubCutaneous at bedtime  isosorbide   mononitrate ER Tablet (IMDUR) 120 milliGRAM(s) Oral daily  lamoTRIgine 100 milliGRAM(s) Oral daily  mirtazapine 30 milliGRAM(s) Oral at bedtime  oxybutynin 5 milliGRAM(s) Oral two times a day  pantoprazole    Tablet 40 milliGRAM(s) Oral before breakfast  senna 2 Tablet(s) Oral at bedtime  sodium chloride 0.65% Nasal 1 Spray(s) Both Nostrils four times a day  venlafaxine XR. 150 milliGRAM(s) Oral daily    MEDICATIONS  (PRN):  acetaminophen     Tablet .. 650 milliGRAM(s) Oral every 6 hours PRN Temp greater or equal to 38.5C (101.3F), Moderate Pain (4 - 6)  ALBUTerol    90 MICROgram(s) HFA Inhaler 2 Puff(s) Inhalation every 6 hours PRN Shortness of Breath and/or Wheezing        Allergies    No Known Allergies    Intolerances        REVIEW OF SYSTEMS:  CONSTITUTIONAL: No fever or chills  HEENT:  No headache, no sore throat  RESPIRATORY: shortness of breath (resolved at rest) ; +VALDEZ (improving)  CARDIOVASCULAR: No chest pain, palpitations  GASTROINTESTINAL: No abd pain, nausea, vomiting, or diarrhea  GENITOURINARY: No dysuria, frequency, or hematuria  NEUROLOGICAL: no focal weakness or dizziness  MUSCULOSKELETAL: +leg swelling (improving) ; no myalgias   PSYCH: +anxiety    Vital Signs Last 24 Hrs  T(C): 36.4 (10 [place] 2022 13:38), Max: 36.5 (09 Jan 2022 20:08)  T(F): 97.6 (10 [place] 2022 13:38), Max: 97.7 (09 Jan 2022 20:08)  HR: 88 (10 [place] 2022 17:28) (60 - 88)  BP: 153/95 (10 [place]co 2022 17:28) (137/84 - 172/87)  BP(mean): --  RR: 18 (10 [place] 2022 13:38) (18 - 18)  SpO2: 94% (10 [place]co 2022 13:38) (94% - 96%)    PHYSICAL EXAM:  GENERAL: NAD at rest on NC  HEENT:  anicteric, moist mucous membranes  CHEST/LUNG:  decreased breath sounds b/l bases  HEART:  rrr, S1, S2, +murmur  ABDOMEN:  BS+, soft, nontender, nondistended  EXTREMITIES: + LE edema b/l, no cyanosis, or calf tenderness  NERVOUS SYSTEM: answers questions and follows commands appropriately    LABS:                                                   9.6    7.70  )-----------( 300      ( 10 [place]co 2022 09:50 )             30.5     CBC Full  -  ( 10 [place]co 2022 09:50 )  WBC Count : 7.70 K/uL  Hemoglobin : 9.6 g/dL  Hematocrit : 30.5 %  Platelet Count - Automated : 300 K/uL  Mean Cell Volume : 85.7 fl  Mean Cell Hemoglobin : 27.0 pg  Mean Cell Hemoglobin Concentration : 31.5 gm/dL  Auto Neutrophil # : x  Auto Lymphocyte # : x  Auto Monocyte # : x  Auto Eosinophil # : x  Auto Basophil # : x  Auto Neutrophil % : x  Auto Lymphocyte % : x  Auto Monocyte % : x  Auto Eosinophil % : x  Auto Basophil % : x    01-10    141  |  100  |  42<H>  ----------------------------<  180<H>  3.3<L>   |  33<H>  |  2.20<H>    Ca    8.6      10 [place] 2022 09:50  Phos  3.0     01-09  Mg     2.0     01-09    TPro  6.5  /  Alb  3.0<L>  /  TBili  0.4  /  DBili  x   /  AST  16  /  ALT  26  /  AlkPhos  60  01-10                Culture - Blood (collected 01-03-22 @ 16:15)  Source: .Blood Blood-Peripheral  Preliminary Report (01-04-22 @ 17:02):    No growth to date.    Culture - Blood (collected 01-03-22 @ 16:15)  Source: .Blood Blood-Peripheral  Preliminary Report (01-04-22 @ 17:02):    No growth to date.    Culture - Urine (collected 01-03-22 @ 16:12)  Source: Clean Catch Clean Catch (Midstream)  Final Report (01-04-22 @ 13:40):    No growth        RADIOLOGY & ADDITIONAL TESTS:    Personally reviewed.     Consultant(s) Notes Reviewed:  [x] YES  [ ] NO

## 2022-01-10 NOTE — PROGRESS NOTE ADULT - ASSESSMENT
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 LV diastolic function cannot determine due to atrial fibrillation  Obesity - exam and risk factors - and features - c/w ELMER - outpatient eval  AF - on AC -   D dimer noted - serial D dimer  cvs rx regimen - diuresis - cxr and proBNP c/w Vol Overload - Cardio eval in progress - old records and TTE reviewed - I and O, serial CE -   dietary discretion  Covid - in vaccinated pt - monitor VS and Sat - Acap and Robitussin PRN - monitor VS and HD and Sat - Decadron is indicated in covid with hypoxemia  pt is on Home Rx regimen of Symbicort - unclear indication - ex smoker - no history of COPD as per pt or medical record - prior CT without Emphysema  isolation for covid -

## 2022-01-10 NOTE — PROGRESS NOTE ADULT - SUBJECTIVE AND OBJECTIVE BOX
Patient is a 76y old  Male who presents with a chief complaint of CHF exacerbation (05 Jan 2022 12:02)    Patient seen in follow up for CKD 4.        PAST MEDICAL HISTORY:  Hypertension    Diabetes mellitus    Lupus    Hepatitis C    Anxiety and depression    CAD (coronary artery disease)    Diverticulosis    Hyperlipidemia    HTN (hypertension)    HLD (hyperlipidemia)    Atrial fibrillation    CAD (coronary artery disease)    Type 2 diabetes mellitus    Anxiety    History of diverticulitis    Diverticulosis    Afib    Stage 3 chronic kidney disease    Anemia of chronic disease    Chronic diastolic congestive heart failure    Multiple thyroid nodules      MEDICATIONS  (STANDING):  amLODIPine   Tablet 10 milliGRAM(s) Oral daily  apixaban 5 milliGRAM(s) Oral two times a day  ARIPiprazole 30 milliGRAM(s) Oral daily  aspirin enteric coated 81 milliGRAM(s) Oral daily  atorvastatin 10 milliGRAM(s) Oral at bedtime  budesonide 160 MICROgram(s)/formoterol 4.5 MICROgram(s) Inhaler 2 Puff(s) Inhalation two times a day  carvedilol 12.5 milliGRAM(s) Oral every 12 hours  cefTRIAXone   IVPB 1000 milliGRAM(s) IV Intermittent every 24 hours  cloNIDine 0.3 milliGRAM(s) Oral two times a day  cyanocobalamin 1000 MICROGram(s) Oral daily  dexAMETHasone     Tablet 6 milliGRAM(s) Oral daily  dextrose 40% Gel 15 Gram(s) Oral once  dextrose 5%. 1000 milliLiter(s) (50 mL/Hr) IV Continuous <Continuous>  dextrose 5%. 1000 milliLiter(s) (100 mL/Hr) IV Continuous <Continuous>  dextrose 50% Injectable 25 Gram(s) IV Push once  dextrose 50% Injectable 12.5 Gram(s) IV Push once  dextrose 50% Injectable 25 Gram(s) IV Push once  famotidine    Tablet 20 milliGRAM(s) Oral daily  folic acid 1 milliGRAM(s) Oral daily  furosemide   Injectable 40 milliGRAM(s) IV Push two times a day  glucagon  Injectable 1 milliGRAM(s) IntraMuscular once  hydrALAZINE 100 milliGRAM(s) Oral three times a day  insulin lispro (ADMELOG) corrective regimen sliding scale   SubCutaneous three times a day before meals  insulin lispro (ADMELOG) corrective regimen sliding scale   SubCutaneous at bedtime  isosorbide   mononitrate ER Tablet (IMDUR) 120 milliGRAM(s) Oral daily  lamoTRIgine 100 milliGRAM(s) Oral daily  mirtazapine 30 milliGRAM(s) Oral at bedtime  oxybutynin 5 milliGRAM(s) Oral two times a day  pantoprazole    Tablet 40 milliGRAM(s) Oral before breakfast  senna 2 Tablet(s) Oral at bedtime  sodium chloride 0.65% Nasal 1 Spray(s) Both Nostrils four times a day  venlafaxine XR. 150 milliGRAM(s) Oral daily    MEDICATIONS  (PRN):  acetaminophen     Tablet .. 650 milliGRAM(s) Oral every 6 hours PRN Temp greater or equal to 38.5C (101.3F), Moderate Pain (4 - 6)  ALBUTerol    90 MICROgram(s) HFA Inhaler 2 Puff(s) Inhalation every 6 hours PRN Shortness of Breath and/or Wheezing    T(C): 36.3 (01-10-22 @ 05:54), Max: 36.5 (01-09-22 @ 20:08)  HR: 68 (01-10-22 @ 11:04) (56 - 68)  BP: 172/87 (01-10-22 @ 11:04) (148/71 - 196/81)  RR: 18 (01-10-22 @ 05:54)  SpO2: 94% (01-10-22 @ 05:54)  Wt(kg): --  I&O's Detail    09 Jan 2022 07:01  -  10 [place]co 2022 07:00  --------------------------------------------------------  IN:    IV PiggyBack: 50 mL  Total IN: 50 mL    OUT:    Voided (mL): 400 mL  Total OUT: 400 mL    Total NET: -350 mL      10 [place]co 2022 07:01  -  10 [place]co 2022 13:13  --------------------------------------------------------  IN:  Total IN: 0 mL    OUT:    Voided (mL): 450 mL  Total OUT: 450 mL    Total NET: -450 mL                      PHYSICAL EXAM:  General: No distress  Respiratory: b/l air entry  Cardiovascular: S1 S2  Gastrointestinal: soft  Extremities:  + edema                             LABORATORY:                        9.6    7.70  )-----------( 300      ( 10 [place]co 2022 09:50 )             30.5     01-10    141  |  100  |  42<H>  ----------------------------<  180<H>  3.3<L>   |  33<H>  |  2.20<H>    Ca    8.6      10 [place] 2022 09:50  Phos  3.0     01-09  Mg     2.0     01-09    TPro  6.5  /  Alb  3.0<L>  /  TBili  0.4  /  DBili  x   /  AST  16  /  ALT  26  /  AlkPhos  60  01-10    Sodium, Serum: 141 mmol/L (01-10 @ 09:50)  Sodium, Serum: 140 mmol/L (01-09 @ 09:49)    Potassium, Serum: 3.3 mmol/L (01-10 @ 09:50)  Potassium, Serum: 3.2 mmol/L (01-09 @ 09:49)    Hemoglobin: 9.6 g/dL (01-10 @ 09:50)  Hemoglobin: 8.7 g/dL (01-09 @ 09:49)  Hemoglobin: 8.3 g/dL (01-08 @ 10:31)  Hemoglobin: 8.3 g/dL (01-07 @ 17:55)    Creatinine, Serum 2.20 (01-10 @ 09:50)  Creatinine, Serum 2.20 (01-09 @ 09:49)  Creatinine, Serum 2.10 (01-08 @ 10:31)        LIVER FUNCTIONS - ( 10 Checo 2022 09:50 )  Alb: 3.0 g/dL / Pro: 6.5 g/dL / ALK PHOS: 60 U/L / ALT: 26 U/L / AST: 16 U/L / GGT: x

## 2022-01-10 NOTE — PROGRESS NOTE ADULT - ASSESSMENT
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 Found to be covid positive. Cardio consulted for elevated Troponins and CHF.    TTE:(10/21): Normal LV size with normal LVSF with LVEF 55-60%. LV diastolic function cannot determine due to atrial fibrillation. Mild mitral regurgitation is present. Mild aortic stenosis is  present. Mild tricuspid regurgitation is present . No evidence of any pulmonary hypertension    Volume overload (acute on chronic diastolic HF), CAD, HTN, Afib  - Still mildly overloaded, though , no O2 requirement, he is still symptomatic  - Continue IV Lasix 40mg BID for now and monitor renal indices.  Renal following Cr stable for now.  Consider transition to po or as per Renal  - Continue to monitor strict I/O's  - Supplemental oxygen prn  - Troponin trended, remains elevated, likely demand ischemia in setting of above, plan for outpatient stress when underlying COVID  resolves however pt remains asymptomatic   - EKG NSR, no ischemic changes noted  - Hx of Afib continue home Eliquis.  rate-controlled  - For Hx of CAD, continue ASA, BB, and statin  - BP remains slightly improved SBP 130s-170s from 140-190  - Continue Norvasc, Imdur, Hydralazine, Clonidine.  After Coreg increased from 6.25mg BID to 12.5mg and Clonidine increased from 0.2mg BID to 0.3mg BID   - Monitor and replete lytes, keep K>4, Mg>2.  - Recommend compression stockings for chronic BLE edema  - Recommend I/S q1h x 10    - Will continue to follow.    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 Found to be covid positive. Cardio consulted for elevated Troponins and CHF.    TTE:(10/21): Normal LV size with normal LVSF with LVEF 55-60%. LV diastolic function cannot determine due to atrial fibrillation. Mild mitral regurgitation is present. Mild aortic stenosis is  present. Mild tricuspid regurgitation is present . No evidence of any pulmonary hypertension    Volume overload (acute on chronic diastolic HF), CAD, HTN, Afib  - Still mildly overloaded, though  no O2 requirement  - Continue IV Lasix 40mg BID for now and monitor renal indices.  Renal following Cr stable for now.  Consider transition to po or as per Renal  - Continue to monitor strict I/O's  - Supplemental oxygen prn  - Troponin trended, remains elevated, likely demand ischemia in setting of above, plan for outpatient stress when underlying COVID  resolves however pt remains asymptomatic   - EKG NSR, no ischemic changes noted  - Hx of Afib continue home Eliquis.  rate-controlled  - For Hx of CAD, continue ASA, BB, and statin  - BP remains slightly improved SBP 130s-170s from 140-190  - Continue Norvasc, Imdur, Hydralazine, Clonidine.  After Coreg increased from 6.25mg BID to 12.5mg and Clonidine increased from 0.2mg BID to 0.3mg BID   - Monitor and replete lytes, keep K>4, Mg>2.  - Recommend compression stockings for chronic BLE edema  - Recommend I/S q1h x 10    - Will continue to follow.    BRONSON Torres  Cardiology   Spectra #[phone]

## 2022-01-10 NOTE — CHART NOTE - NSCHARTNOTEFT_GEN_A_CORE
RN called to report that patient is refusing remote telemetry, removing leads on his own. Attempted to convince patient keep leads on. Patient is alert and oriented and continued to adamantly refuse remote telemetry. Explained to patient considering his cardiac history it is in his interest to keep the remote telemetry on, however, patient verbalized understanding of the risks of not keeping telemetry leads attached and continued to refuse. Will continue to monitor, RN to call w/ any changes.

## 2022-01-10 NOTE — PROVIDER CONTACT NOTE (OTHER) - ACTION/TREATMENT ORDERED:
per md he will add orders for pt. no new orders for now. per md he will add orders for pt. per md remote tele at this time. no new orders.

## 2022-01-10 NOTE — DIETITIAN INITIAL EVALUATION ADULT. - PROBLEM SELECTOR PLAN 10
H/H 9.2/29.1 poa, baseline per chart review ~8.5  Continue home folic acid, vitamin B12  no signs or symptoms of active bleeding. Continue to monitor hgb.

## 2022-01-10 NOTE — DIETITIAN INITIAL EVALUATION ADULT. - OTHER INFO
75 y/o male adm with SOB, HF, + COVID 19. PMH HF, anemia of chronic disease, CKD, afib, anxiety, diverticulitis, Type 2 DM, CAD, HLD, HTN, multiple thyroid nodules. Pt visited at bedside this am. Pt sleepy, was able to answer some questions. Pt states that he is "eating too much". Having normal BMs. No complaints re: meals at this time. A1c noted to be 6.2. Pt familiar to RD from past admission (7/2021 and 10/2021) at which time HF education was provided. Pt not up for education at this time. Pt sleepy. Will remain available for diet education prior to d/c if needed. Current diet order appropriate.

## 2022-01-10 NOTE — DIETITIAN INITIAL EVALUATION ADULT. - PERTINENT LABORATORY DATA
01-10 Na 141 mmol/L Glu 180 mg/dL<H> K+ 3.3 mmol/L<L> Cr  2.20 mg/dL<H> BUN 42 mg/dL<H> Phos n/a   Alb 3.0 g/dL<L> PAB n/a   Hgb 9.6 g/dL<L> Hct 30.5 %<L>

## 2022-01-10 NOTE — PROGRESS NOTE ADULT - SUBJECTIVE AND OBJECTIVE BOX
[facility] -- Fifi Bliss, Bonifacio, Kalpesh, Abraham Sheridan Savella  Office # [phone]      Follow Up:  CAD s/p stents, Elevated Trops, Volume overload    Subjective/Observations: Seen and examined.  Lying in bed resting comfortably.  Denies cp, sob or palpitations.  No new complaints.  NAD.       REVIEW OF SYSTEMS: All other review of systems is negative unless indicated above    PAST MEDICAL & SURGICAL HISTORY:  HTN (hypertension)  c/b multiple episodes of hypertensive urgency    HLD (hyperlipidemia)    Atrial fibrillation  first documented on EKG 10/7/2021    CAD (coronary artery disease)  s/p stents (not on plavix)    Type 2 diabetes mellitus  not on home insulin/ Meds    Anxiety  Depression  multiple psych medications    History of diverticulitis  07/2021    Diverticulosis  c/b GIB in 2020    Afib  on AC    Stage 3 chronic kidney disease    Anemia of chronic disease  Monoclonal Gammopathy-MGUS  pRBC transfusion 10/15/21    Chronic diastolic congestive heart failure    Multiple thyroid nodules    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  Had surgery ( April 2013 )    S/P tonsillectomy    S/P arthroscopic knee surgery  Bilateral ( 2005 )    Torsion of testicle  Had surgery at age 14    Pilonidal cyst  Had surgery ( 1969 )    S/P cataract surgery  Bilateral    H/O hernia repair        MEDICATIONS  (STANDING):  amLODIPine   Tablet 10 milliGRAM(s) Oral daily  apixaban 5 milliGRAM(s) Oral two times a day  ARIPiprazole 30 milliGRAM(s) Oral daily  aspirin enteric coated 81 milliGRAM(s) Oral daily  atorvastatin 10 milliGRAM(s) Oral at bedtime  budesonide 160 MICROgram(s)/formoterol 4.5 MICROgram(s) Inhaler 2 Puff(s) Inhalation two times a day  carvedilol 12.5 milliGRAM(s) Oral every 12 hours  cefTRIAXone   IVPB 1000 milliGRAM(s) IV Intermittent every 24 hours  cloNIDine 0.3 milliGRAM(s) Oral two times a day  cyanocobalamin 1000 MICROGram(s) Oral daily  dexAMETHasone     Tablet 6 milliGRAM(s) Oral daily  dextrose 40% Gel 15 Gram(s) Oral once  dextrose 5%. 1000 milliLiter(s) (50 mL/Hr) IV Continuous <Continuous>  dextrose 5%. 1000 milliLiter(s) (100 mL/Hr) IV Continuous <Continuous>  dextrose 50% Injectable 25 Gram(s) IV Push once  dextrose 50% Injectable 12.5 Gram(s) IV Push once  dextrose 50% Injectable 25 Gram(s) IV Push once  famotidine    Tablet 20 milliGRAM(s) Oral daily  folic acid 1 milliGRAM(s) Oral daily  furosemide   Injectable 40 milliGRAM(s) IV Push two times a day  glucagon  Injectable 1 milliGRAM(s) IntraMuscular once  hydrALAZINE 100 milliGRAM(s) Oral three times a day  insulin lispro (ADMELOG) corrective regimen sliding scale   SubCutaneous three times a day before meals  insulin lispro (ADMELOG) corrective regimen sliding scale   SubCutaneous at bedtime  isosorbide   mononitrate ER Tablet (IMDUR) 120 milliGRAM(s) Oral daily  lamoTRIgine 100 milliGRAM(s) Oral daily  mirtazapine 30 milliGRAM(s) Oral at bedtime  oxybutynin 5 milliGRAM(s) Oral two times a day  pantoprazole    Tablet 40 milliGRAM(s) Oral before breakfast  senna 2 Tablet(s) Oral at bedtime  sodium chloride 0.65% Nasal 1 Spray(s) Both Nostrils four times a day  venlafaxine XR. 150 milliGRAM(s) Oral daily    MEDICATIONS  (PRN):  acetaminophen     Tablet .. 650 milliGRAM(s) Oral every 6 hours PRN Temp greater or equal to 38.5C (101.3F), Moderate Pain (4 - 6)  ALBUTerol    90 MICROgram(s) HFA Inhaler 2 Puff(s) Inhalation every 6 hours PRN Shortness of Breath and/or Wheezing      Allergies    No Known Allergies    Intolerances            Vital Signs Last 24 Hrs  T(C): 36.4 (10 Checo 2022 13:38), Max: 36.5 (09 Jan 2022 20:08)  T(F): 97.6 (10 Checo 2022 13:38), Max: 97.7 (09 Jan 2022 20:08)  HR: 88 (10 Checo 2022 17:28) (60 - 88)  BP: 153/95 (10 Checo 2022 17:28) (137/84 - 172/87)  BP(mean): --  RR: 18 (10 Checo 2022 13:38) (18 - 18)  SpO2: 94% (10 Checo 2022 13:38) (94% - 96%)    I&O's Summary    09 Jan 2022 07:01  -  10 Checo 2022 07:00  --------------------------------------------------------  IN: 50 mL / OUT: 400 mL / NET: -350 mL    10 Checo 2022 07:01  -  10 Checo 2022 18:08  --------------------------------------------------------  IN: 0 mL / OUT: 450 mL / NET: -450 mL          PHYSICAL EXAM:  TELE: Not on tele  Constitutional: NAD, awake and alert, well-developed  HEENT: Moist Mucous Membranes, Anicteric  Pulmonary: Non-labored, breath sounds are distant and shallow but lear bilaterally, No wheezing, rales or rhonchi  Cardiovascular: Regular, S1 and S2, No murmurs, rubs, gallops or clicks  Gastrointestinal: Bowel Sounds present, soft, nontender.   Lymph: 2+ pitting edema. No lymphadenopathy.  Skin: No visible rashes or ulcers.  Psych:  Mood & affect appropriate    LABS: All Labs Reviewed:                        9.6    7.70  )-----------( 300      ( 10 Checo 2022 09:50 )             30.5                         8.7    6.28  )-----------( 249      ( 09 Jan 2022 09:49 )             27.9                         8.3    5.60  )-----------( 236      ( 08 Jan 2022 10:31 )             26.2     10 Checo 2022 09:50    141    |  100    |  42     ----------------------------<  180    3.3     |  33     |  2.20   09 Jan 2022 09:49    140    |  103    |  44     ----------------------------<  241    3.2     |  30     |  2.20   08 Jan 2022 10:31    143    |  107    |  44     ----------------------------<  140    3.4     |  28     |  2.10     Ca    8.6        10 Jan 2022 09:50  Ca    8.8        09 Jan 2022 09:49  Ca    7.9        08 Jan 2022 10:31  Phos  3.0       09 Jan 2022 09:49  Mg     2.0       09 Jan 2022 09:49    TPro  6.5    /  Alb  3.0    /  TBili  0.4    /  DBili  x      /  AST  16     /  ALT  26     /  AlkPhos  60     10 Jan 2022 09:50  TPro  6.2    /  Alb  2.8    /  TBili  0.4    /  DBili  x      /  AST  16     /  ALT  25     /  AlkPhos  60     09 Jan 2022 09:49  TPro  5.9    /  Alb  2.6    /  TBili  0.2    /  DBili  x      /  AST  15     /  ALT  25     /  AlkPhos  56     08 Jan 2022 10:31      < from: 12 Lead ECG (01.03.22 @ 10:49) >    Ventricular Rate 94 BPM    Atrial Rate 91 BPM    QRS Duration 100 ms    Q-T Interval 378 ms    QTC Calculation(Bazett) 472 ms    R Axis -15 degrees    T Axis 261 degrees    Diagnosis Line Atrial fibrillation  Incomplete right bundle branch block  Moderate voltage criteria for LVH, may be normal variant  Nonspecific T wave abnormality  Prolonged QT  Abnormal ECG  Confirmed by Bonifacio BARRERA, Victorino ([id number]) on 1/3/2022 3:17:52 PM    < end of copied text >  < from: TTE Echo Complete w/o Contrast w/ Doppler (01.06.22 @ 10:52) >   EXAM:  ECHO TTE WO CON COMP W DOPP         PROCEDURE DATE:  01/06/2022        INTERPRETATION:  INDICATION: Dyspnea  Sonographer PH    Blood Pressure 177/86    Height 172.7 cm     Weight 104.4 kg    Dimensions:  LA 4.2       Normal Values: 2.0 - 4.0 cm  Ao 3.7        Normal Values: 2.0 - 3.8 cm  SEPTUM 1.4       Normal Values: 0.6 - 1.2 cm  PWT 1.4       Normal Values: 0.6 - 1.1 cm  LVIDd 5.2         Normal Values: 3.0 - 5.6 cm  LVIDs 4.2         Normal Values: 1.8 - 4.0 cm      OBSERVATIONS:  Technically difficult study  Mitral Valve: Mitral annular calcification with thickened leaflets, mild   MR.  Aortic Valve/Aorta: Calcified trileaflet aortic valve with decreased   opening. Peak transaortic valve gradient is 29.4 mmHg with a mean   transaortic valve gradient 14.7 mmHg. This consistent with mild aortic   stenosis.  Tricuspid Valve: Mild TR.  Pulmonic Valve: Not well-visualized  Left Atrium: Enlarged  Right Atrium: Not well-visualized  Left Ventricle: Moderate left ventricular hypertrophy with normal   systolic function, estimated LVEF of 55%.  Right Ventricle: Grossly normal size and systolic function.  Pericardium: no significant pericardial effusion.  Pulmonary/RV Pressure: estimated PA systolic pressure of 32mmHg        IMPRESSION:  Technically difficult study  Moderate left ventricular hypertrophy with normal systolic function,   estimated LVEF of 55%.  Grossly normal RV size and systolic function.  Calcified trileaflet aortic valve with mild aortic stenosis  Mild MR andTR.  No significant pericardial effusion.    --- End of Report ---              VIET GREENWOOD MD; Attending Cardiologist  This document has been electronically signed. Jan 7 2022 11:19AM    < end of copied text >        < from: Xray Chest 1 View-PORTABLE IMMEDIATE (Xray Chest 1 View-PORTABLE IMMEDIATE .) (01.05.22 @ 08:14) >    ACC: [id number] EXAM:  XR CHEST PORTABLE IMMED 1V                          PROCEDURE DATE:  01/05/2022          INTERPRETATION:  DATE OF STUDY: 1/5/22    PRIOR:12/24/21    CLINICAL INDICATION: New fever.    TECHNIQUE: portable chest.    FINDINGS:  There is stable cardiomegaly.  The left lung is clear.  Partial clearing of previously noted lower right lung airspace haziness.  New airspace opacity in mid-right lung just above right minor fissure.   New airspace opacity in upper right lung -these findings are concerning   for pneumonia right clinical setting.  No sizable pleural effusion. No pneumothorax.  Degenerative changes of the thoracic spine.    IMPRESSION:  Clear left lung.  New probable pneumonic infiltrates in mid and upper right lung.    --- End of Report ---            JOHN MARADIAGA MD; Attending Radiologist  This document has been electronically signed. Jan 5 2022  9:44AM    < end of copied text >

## 2022-01-10 NOTE — PROGRESS NOTE ADULT - SUBJECTIVE AND OBJECTIVE BOX
Date/Time Patient Seen:  		  Referring MD:   Data Reviewed	       Patient is a 76y old  Male who presents with a chief complaint of acute hypoxic and hypercarbic respiratory failure due to CHF exacerbation and COVID-19 PNA (09 Jan 2022 16:46)      Subjective/HPI     PAST MEDICAL & SURGICAL HISTORY:  Hypertension    Diabetes mellitus    Lupus    Hepatitis C    Anxiety and depression    CAD (coronary artery disease)  s/p stents    Diverticulosis    Hyperlipidemia    HTN (hypertension)  c/b multiple episodes of hypertensive urgency    HLD (hyperlipidemia)    Atrial fibrillation  first documented on EKG 10/7/2021    CAD (coronary artery disease)  s/p stents (not on plavix)    Type 2 diabetes mellitus  not on home insulin/ Meds    Anxiety  Depression  multiple psych medications    History of diverticulitis  07/2021    Diverticulosis  c/b GIB in 2020    Afib  on AC    Stage 3 chronic kidney disease    Anemia of chronic disease  Monoclonal Gammopathy-MGUS  pRBC transfusion 10/15/21    Chronic diastolic congestive heart failure    Multiple thyroid nodules    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  Had surgery ( April 2013 )    S/P tonsillectomy    S/P arthroscopic knee surgery  Bilateral ( 2005 )    Torsion of testicle  Had surgery at age 14    Pilonidal cyst  Had surgery ( 1969 )    S/P cataract surgery  Bilateral    H/O hernia repair          Medication list         MEDICATIONS  (STANDING):  amLODIPine   Tablet 10 milliGRAM(s) Oral daily  apixaban 5 milliGRAM(s) Oral two times a day  ARIPiprazole 30 milliGRAM(s) Oral daily  aspirin enteric coated 81 milliGRAM(s) Oral daily  atorvastatin 10 milliGRAM(s) Oral at bedtime  budesonide 160 MICROgram(s)/formoterol 4.5 MICROgram(s) Inhaler 2 Puff(s) Inhalation two times a day  carvedilol 12.5 milliGRAM(s) Oral every 12 hours  cefTRIAXone   IVPB 1000 milliGRAM(s) IV Intermittent every 24 hours  cloNIDine 0.3 milliGRAM(s) Oral two times a day  cyanocobalamin 1000 MICROGram(s) Oral daily  dexAMETHasone     Tablet 6 milliGRAM(s) Oral daily  dextrose 40% Gel 15 Gram(s) Oral once  dextrose 5%. 1000 milliLiter(s) (50 mL/Hr) IV Continuous <Continuous>  dextrose 5%. 1000 milliLiter(s) (100 mL/Hr) IV Continuous <Continuous>  dextrose 50% Injectable 25 Gram(s) IV Push once  dextrose 50% Injectable 12.5 Gram(s) IV Push once  dextrose 50% Injectable 25 Gram(s) IV Push once  famotidine    Tablet 20 milliGRAM(s) Oral daily  folic acid 1 milliGRAM(s) Oral daily  furosemide   Injectable 40 milliGRAM(s) IV Push two times a day  glucagon  Injectable 1 milliGRAM(s) IntraMuscular once  hydrALAZINE 100 milliGRAM(s) Oral three times a day  insulin lispro (ADMELOG) corrective regimen sliding scale   SubCutaneous three times a day before meals  insulin lispro (ADMELOG) corrective regimen sliding scale   SubCutaneous at bedtime  isosorbide   mononitrate ER Tablet (IMDUR) 120 milliGRAM(s) Oral daily  lamoTRIgine 100 milliGRAM(s) Oral daily  mirtazapine 30 milliGRAM(s) Oral at bedtime  oxybutynin 5 milliGRAM(s) Oral two times a day  pantoprazole    Tablet 40 milliGRAM(s) Oral before breakfast  senna 2 Tablet(s) Oral at bedtime  sodium chloride 0.65% Nasal 1 Spray(s) Both Nostrils four times a day  venlafaxine XR. 150 milliGRAM(s) Oral daily    MEDICATIONS  (PRN):  acetaminophen     Tablet .. 650 milliGRAM(s) Oral every 6 hours PRN Temp greater or equal to 38.5C (101.3F), Moderate Pain (4 - 6)  ALBUTerol    90 MICROgram(s) HFA Inhaler 2 Puff(s) Inhalation every 6 hours PRN Shortness of Breath and/or Wheezing         Vitals log        ICU Vital Signs Last 24 Hrs  T(C): 36.5 (09 Jan 2022 20:08), Max: 36.5 (09 Jan 2022 20:08)  T(F): 97.7 (09 Jan 2022 20:08), Max: 97.7 (09 Jan 2022 20:08)  HR: 60 (09 Jan 2022 20:08) (56 - 61)  BP: 165/90 (09 Jan 2022 20:08) (156/80 - 196/81)  BP(mean): --  ABP: --  ABP(mean): --  RR: 18 (09 Jan 2022 20:08) (18 - 18)  SpO2: 96% (09 Jan 2022 20:08) (94% - 96%)           Input and Output:  I&O's Detail    08 Jan 2022 07:01  -  09 Jan 2022 07:00  --------------------------------------------------------  IN:    IV PiggyBack: 50 mL  Total IN: 50 mL    OUT:    Voided (mL): 1200 mL  Total OUT: 1200 mL    Total NET: -1150 mL      09 Jan 2022 07:01  -  10 [place]co 2022 05:51  --------------------------------------------------------  IN:    IV PiggyBack: 50 mL  Total IN: 50 mL    OUT:    Voided (mL): 400 mL  Total OUT: 400 mL    Total NET: -350 mL          Lab Data                        8.7    6.28  )-----------( 249      ( 09 Jan 2022 09:49 )             27.9     01-09    140  |  103  |  44<H>  ----------------------------<  241<H>  3.2<L>   |  30  |  2.20<H>    Ca    8.8      09 Jan 2022 09:49  Phos  3.0     01-09  Mg     2.0     01-09    TPro  6.2  /  Alb  2.8<L>  /  TBili  0.4  /  DBili  x   /  AST  16  /  ALT  25  /  AlkPhos  60  01-09            Review of Systems	      Objective     Physical Examination    heart s1s2  lung dc BS  abd soft  head nc      Pertinent Lab findings & Imaging      Nikko:  NO   Adequate UO     I&O's Detail    08 Jan 2022 07:01  -  09 Jan 2022 07:00  --------------------------------------------------------  IN:    IV PiggyBack: 50 mL  Total IN: 50 mL    OUT:    Voided (mL): 1200 mL  Total OUT: 1200 mL    Total NET: -1150 mL      09 Jan 2022 07:01  -  10 Jan 2022 05:51  --------------------------------------------------------  IN:    IV PiggyBack: 50 mL  Total IN: 50 mL    OUT:    Voided (mL): 400 mL  Total OUT: 400 mL    Total NET: -350 mL               Discussed with:     Cultures:	        Radiology

## 2022-01-11 LAB
ALBUMIN SERPL ELPH-MCNC: 2.7 G/DL — LOW (ref 3.3–5)
ALP SERPL-CCNC: 58 U/L — SIGNIFICANT CHANGE UP (ref 40–120)
ALT FLD-CCNC: 21 U/L — SIGNIFICANT CHANGE UP (ref 12–78)
ANION GAP SERPL CALC-SCNC: 7 MMOL/L — SIGNIFICANT CHANGE UP (ref 5–17)
AST SERPL-CCNC: 11 U/L — LOW (ref 15–37)
BASOPHILS # BLD AUTO: 0.02 K/UL — SIGNIFICANT CHANGE UP (ref 0–0.2)
BASOPHILS NFR BLD AUTO: 0.2 % — SIGNIFICANT CHANGE UP (ref 0–2)
BILIRUB SERPL-MCNC: 0.3 MG/DL — SIGNIFICANT CHANGE UP (ref 0.2–1.2)
BUN SERPL-MCNC: 39 MG/DL — HIGH (ref 7–23)
CALCIUM SERPL-MCNC: 8.2 MG/DL — LOW (ref 8.5–10.1)
CHLORIDE SERPL-SCNC: 100 MMOL/L — SIGNIFICANT CHANGE UP (ref 96–108)
CO2 SERPL-SCNC: 33 MMOL/L — HIGH (ref 22–31)
CREAT SERPL-MCNC: 2.3 MG/DL — HIGH (ref 0.5–1.3)
EOSINOPHIL # BLD AUTO: 0.05 K/UL — SIGNIFICANT CHANGE UP (ref 0–0.5)
EOSINOPHIL NFR BLD AUTO: 0.5 % — SIGNIFICANT CHANGE UP (ref 0–6)
GLUCOSE SERPL-MCNC: 249 MG/DL — HIGH (ref 70–99)
HCT VFR BLD CALC: 30.4 % — LOW (ref 39–50)
HGB BLD-MCNC: 9.7 G/DL — LOW (ref 13–17)
IMM GRANULOCYTES NFR BLD AUTO: 1.6 % — HIGH (ref 0–1.5)
LYMPHOCYTES # BLD AUTO: 0.95 K/UL — LOW (ref 1–3.3)
LYMPHOCYTES # BLD AUTO: 9.9 % — LOW (ref 13–44)
MAGNESIUM SERPL-MCNC: 2.5 MG/DL — SIGNIFICANT CHANGE UP (ref 1.6–2.6)
MCHC RBC-ENTMCNC: 27.4 PG — SIGNIFICANT CHANGE UP (ref 27–34)
MCHC RBC-ENTMCNC: 31.9 GM/DL — LOW (ref 32–36)
MCV RBC AUTO: 85.9 FL — SIGNIFICANT CHANGE UP (ref 80–100)
MONOCYTES # BLD AUTO: 0.23 K/UL — SIGNIFICANT CHANGE UP (ref 0–0.9)
MONOCYTES NFR BLD AUTO: 2.4 % — SIGNIFICANT CHANGE UP (ref 2–14)
NEUTROPHILS # BLD AUTO: 8.16 K/UL — HIGH (ref 1.8–7.4)
NEUTROPHILS NFR BLD AUTO: 85.4 % — HIGH (ref 43–77)
NRBC # BLD: 0 /100 WBCS — SIGNIFICANT CHANGE UP (ref 0–0)
PLATELET # BLD AUTO: 317 K/UL — SIGNIFICANT CHANGE UP (ref 150–400)
POTASSIUM SERPL-MCNC: 3.7 MMOL/L — SIGNIFICANT CHANGE UP (ref 3.5–5.3)
POTASSIUM SERPL-SCNC: 3.7 MMOL/L — SIGNIFICANT CHANGE UP (ref 3.5–5.3)
PROT SERPL-MCNC: 5.9 G/DL — LOW (ref 6–8.3)
RBC # BLD: 3.54 M/UL — LOW (ref 4.2–5.8)
RBC # FLD: 17.7 % — HIGH (ref 10.3–14.5)
SODIUM SERPL-SCNC: 140 MMOL/L — SIGNIFICANT CHANGE UP (ref 135–145)
WBC # BLD: 9.56 K/UL — SIGNIFICANT CHANGE UP (ref 3.8–10.5)
WBC # FLD AUTO: 9.56 K/UL — SIGNIFICANT CHANGE UP (ref 3.8–10.5)

## 2022-01-11 PROCEDURE — 99232 SBSQ HOSP IP/OBS MODERATE 35: CPT

## 2022-01-11 PROCEDURE — 99233 SBSQ HOSP IP/OBS HIGH 50: CPT

## 2022-01-11 RX ORDER — FUROSEMIDE 40 MG
40 TABLET ORAL
Refills: 0 | Status: DISCONTINUED | OUTPATIENT
Start: 2022-01-11 | End: 2022-01-14

## 2022-01-11 RX ORDER — POTASSIUM CHLORIDE 20 MEQ
40 PACKET (EA) ORAL ONCE
Refills: 0 | Status: COMPLETED | OUTPATIENT
Start: 2022-01-11 | End: 2022-01-11

## 2022-01-11 RX ADMIN — Medication 0.3 MILLIGRAM(S): at 06:44

## 2022-01-11 RX ADMIN — SENNA PLUS 2 TABLET(S): 8.6 TABLET ORAL at 22:49

## 2022-01-11 RX ADMIN — FAMOTIDINE 20 MILLIGRAM(S): 10 INJECTION INTRAVENOUS at 11:13

## 2022-01-11 RX ADMIN — Medication 40 MILLIEQUIVALENT(S): at 16:20

## 2022-01-11 RX ADMIN — Medication 5 MILLIGRAM(S): at 17:19

## 2022-01-11 RX ADMIN — Medication 100 MILLIGRAM(S): at 13:36

## 2022-01-11 RX ADMIN — ATORVASTATIN CALCIUM 10 MILLIGRAM(S): 80 TABLET, FILM COATED ORAL at 22:49

## 2022-01-11 RX ADMIN — PANTOPRAZOLE SODIUM 40 MILLIGRAM(S): 20 TABLET, DELAYED RELEASE ORAL at 06:44

## 2022-01-11 RX ADMIN — Medication 1 MILLIGRAM(S): at 11:13

## 2022-01-11 RX ADMIN — APIXABAN 5 MILLIGRAM(S): 2.5 TABLET, FILM COATED ORAL at 06:44

## 2022-01-11 RX ADMIN — Medication 6 MILLIGRAM(S): at 06:43

## 2022-01-11 RX ADMIN — Medication 5 MILLIGRAM(S): at 06:44

## 2022-01-11 RX ADMIN — Medication 100 MILLIGRAM(S): at 06:44

## 2022-01-11 RX ADMIN — LAMOTRIGINE 100 MILLIGRAM(S): 25 TABLET, ORALLY DISINTEGRATING ORAL at 11:13

## 2022-01-11 RX ADMIN — Medication 81 MILLIGRAM(S): at 11:13

## 2022-01-11 RX ADMIN — BUDESONIDE AND FORMOTEROL FUMARATE DIHYDRATE 2 PUFF(S): 160; 4.5 AEROSOL RESPIRATORY (INHALATION) at 17:19

## 2022-01-11 RX ADMIN — Medication 2: at 16:56

## 2022-01-11 RX ADMIN — Medication 40 MILLIGRAM(S): at 17:19

## 2022-01-11 RX ADMIN — AMLODIPINE BESYLATE 10 MILLIGRAM(S): 2.5 TABLET ORAL at 06:44

## 2022-01-11 RX ADMIN — CEFTRIAXONE 100 MILLIGRAM(S): 500 INJECTION, POWDER, FOR SOLUTION INTRAMUSCULAR; INTRAVENOUS at 11:14

## 2022-01-11 RX ADMIN — Medication 100 MILLIGRAM(S): at 22:48

## 2022-01-11 RX ADMIN — APIXABAN 5 MILLIGRAM(S): 2.5 TABLET, FILM COATED ORAL at 17:19

## 2022-01-11 RX ADMIN — PREGABALIN 1000 MICROGRAM(S): 225 CAPSULE ORAL at 11:13

## 2022-01-11 RX ADMIN — Medication 0.5 MILLIGRAM(S): at 13:33

## 2022-01-11 RX ADMIN — Medication 6: at 12:01

## 2022-01-11 RX ADMIN — Medication 150 MILLIGRAM(S): at 11:14

## 2022-01-11 RX ADMIN — MIRTAZAPINE 30 MILLIGRAM(S): 45 TABLET, ORALLY DISINTEGRATING ORAL at 22:48

## 2022-01-11 RX ADMIN — CARVEDILOL PHOSPHATE 12.5 MILLIGRAM(S): 80 CAPSULE, EXTENDED RELEASE ORAL at 06:43

## 2022-01-11 RX ADMIN — ISOSORBIDE MONONITRATE 120 MILLIGRAM(S): 60 TABLET, EXTENDED RELEASE ORAL at 11:13

## 2022-01-11 RX ADMIN — Medication 40 MILLIGRAM(S): at 06:44

## 2022-01-11 RX ADMIN — Medication 0.3 MILLIGRAM(S): at 17:19

## 2022-01-11 RX ADMIN — CARVEDILOL PHOSPHATE 12.5 MILLIGRAM(S): 80 CAPSULE, EXTENDED RELEASE ORAL at 17:19

## 2022-01-11 RX ADMIN — ARIPIPRAZOLE 30 MILLIGRAM(S): 15 TABLET ORAL at 11:14

## 2022-01-11 NOTE — PROGRESS NOTE ADULT - SUBJECTIVE AND OBJECTIVE BOX
Date/Time Patient Seen:  		  Referring MD:   Data Reviewed	       Patient is a 76y old  Male who presents with a chief complaint of CHF exacerbation (10 [place]co 2022 18:07)      Subjective/HPI     PAST MEDICAL & SURGICAL HISTORY:  Hypertension    Diabetes mellitus    Lupus    Hepatitis C    Anxiety and depression    CAD (coronary artery disease)  s/p stents    Diverticulosis    Hyperlipidemia    HTN (hypertension)  c/b multiple episodes of hypertensive urgency    HLD (hyperlipidemia)    Atrial fibrillation  first documented on EKG 10/7/2021    CAD (coronary artery disease)  s/p stents (not on plavix)    Type 2 diabetes mellitus  not on home insulin/ Meds    Anxiety  Depression  multiple psych medications    History of diverticulitis  07/2021    Diverticulosis  c/b GIB in 2020    Afib  on AC    Stage 3 chronic kidney disease    Anemia of chronic disease  Monoclonal Gammopathy-MGUS  pRBC transfusion 10/15/21    Chronic diastolic congestive heart failure    Multiple thyroid nodules    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  Had surgery ( April 2013 )    S/P tonsillectomy    S/P arthroscopic knee surgery  Bilateral ( 2005 )    Torsion of testicle  Had surgery at age 14    Pilonidal cyst  Had surgery ( 1969 )    S/P cataract surgery  Bilateral    H/O hernia repair          Medication list         MEDICATIONS  (STANDING):  amLODIPine   Tablet 10 milliGRAM(s) Oral daily  apixaban 5 milliGRAM(s) Oral two times a day  ARIPiprazole 30 milliGRAM(s) Oral daily  aspirin enteric coated 81 milliGRAM(s) Oral daily  atorvastatin 10 milliGRAM(s) Oral at bedtime  budesonide 160 MICROgram(s)/formoterol 4.5 MICROgram(s) Inhaler 2 Puff(s) Inhalation two times a day  carvedilol 12.5 milliGRAM(s) Oral every 12 hours  cefTRIAXone   IVPB 1000 milliGRAM(s) IV Intermittent every 24 hours  cloNIDine 0.3 milliGRAM(s) Oral two times a day  cyanocobalamin 1000 MICROGram(s) Oral daily  dexAMETHasone     Tablet 6 milliGRAM(s) Oral daily  dextrose 40% Gel 15 Gram(s) Oral once  dextrose 5%. 1000 milliLiter(s) (50 mL/Hr) IV Continuous <Continuous>  dextrose 5%. 1000 milliLiter(s) (100 mL/Hr) IV Continuous <Continuous>  dextrose 50% Injectable 25 Gram(s) IV Push once  dextrose 50% Injectable 12.5 Gram(s) IV Push once  dextrose 50% Injectable 25 Gram(s) IV Push once  famotidine    Tablet 20 milliGRAM(s) Oral daily  folic acid 1 milliGRAM(s) Oral daily  furosemide   Injectable 40 milliGRAM(s) IV Push two times a day  glucagon  Injectable 1 milliGRAM(s) IntraMuscular once  hydrALAZINE 100 milliGRAM(s) Oral three times a day  insulin lispro (ADMELOG) corrective regimen sliding scale   SubCutaneous at bedtime  insulin lispro (ADMELOG) corrective regimen sliding scale   SubCutaneous three times a day before meals  isosorbide   mononitrate ER Tablet (IMDUR) 120 milliGRAM(s) Oral daily  lamoTRIgine 100 milliGRAM(s) Oral daily  mirtazapine 30 milliGRAM(s) Oral at bedtime  oxybutynin 5 milliGRAM(s) Oral two times a day  pantoprazole    Tablet 40 milliGRAM(s) Oral before breakfast  senna 2 Tablet(s) Oral at bedtime  sodium chloride 0.65% Nasal 1 Spray(s) Both Nostrils four times a day  venlafaxine XR. 150 milliGRAM(s) Oral daily    MEDICATIONS  (PRN):  acetaminophen     Tablet .. 650 milliGRAM(s) Oral every 6 hours PRN Temp greater or equal to 38.5C (101.3F), Moderate Pain (4 - 6)  ALBUTerol    90 MICROgram(s) HFA Inhaler 2 Puff(s) Inhalation every 6 hours PRN Shortness of Breath and/or Wheezing         Vitals log        ICU Vital Signs Last 24 Hrs  T(C): 36.7 (10 [place]co 2022 20:12), Max: 36.7 (10 Checo 2022 20:12)  T(F): 98 (10 Checo 2022 20:12), Max: 98 (10 Checo 2022 20:12)  HR: 67 (10 Checo 2022 20:12) (63 - 88)  BP: 166/90 (10 Checo 2022 20:12) (137/84 - 172/87)  BP(mean): --  ABP: --  ABP(mean): --  RR: 17 (10 Checo 2022 20:12) (17 - 18)  SpO2: 99% (10 Checo 2022 20:12) (94% - 99%)           Input and Output:  I&O's Detail    09 Jan 2022 07:01  -  10 [place]co 2022 07:00  --------------------------------------------------------  IN:    IV PiggyBack: 50 mL  Total IN: 50 mL    OUT:    Voided (mL): 400 mL  Total OUT: 400 mL    Total NET: -350 mL      10 [place]co 2022 07:01  -  11 Jan 2022 05:26  --------------------------------------------------------  IN:  Total IN: 0 mL    OUT:    Voided (mL): 450 mL  Total OUT: 450 mL    Total NET: -450 mL          Lab Data                        9.6    7.70  )-----------( 300      ( 10 Checo 2022 09:50 )             30.5     01-10    141  |  100  |  42<H>  ----------------------------<  180<H>  3.3<L>   |  33<H>  |  2.20<H>    Ca    8.6      10 [place]co 2022 09:50  Phos  3.0     01-09  Mg     2.0     01-09    TPro  6.5  /  Alb  3.0<L>  /  TBili  0.4  /  DBili  x   /  AST  16  /  ALT  26  /  AlkPhos  60  01-10            Review of Systems	      Objective     Physical Examination    heart s1s2  lung dec BS  abd soft  head nc      Pertinent Lab findings & Imaging      Nikko:  NO   Adequate UO     I&O's Detail    09 Jan 2022 07:01  -  10 [place]co 2022 07:00  --------------------------------------------------------  IN:    IV PiggyBack: 50 mL  Total IN: 50 mL    OUT:    Voided (mL): 400 mL  Total OUT: 400 mL    Total NET: -350 mL      10 [place]co 2022 07:01  -  11 Jan 2022 05:26  --------------------------------------------------------  IN:  Total IN: 0 mL    OUT:    Voided (mL): 450 mL  Total OUT: 450 mL    Total NET: -450 mL               Discussed with:     Cultures:	        Radiology

## 2022-01-11 NOTE — PROGRESS NOTE ADULT - ASSESSMENT
75 yo M PMH of A fib (on Eliquis) , MGUS s/p PRBC transfusion 10/21, anxiety/depression, CAD s/p stents (not on plavix), CHF, Diverticulosis, HLD, HTN, thyroid nodules, CKD stage 3, DM presents to the ED with SOB    02 as needed - refuses monitoring - o2 - overnight events noted - vs noted -   use NIPPV as tolerated  VBG repeat noted  vs noted  ENT eval noted - epistaxis - pt is on eliquis - on nasal saline now -   remains on ABX - legionella ag neg    Previous echo 10/21 showed: Normal LV size with normal LV systolic function with estimated LVEF 55-60%. LV diastolic function cannot determine due to atrial fibrillation  Obesity - exam and risk factors - and features - c/w ELMER - outpatient eval  AF - on AC -   D dimer noted - serial D dimer  cvs rx regimen - diuresis - cxr and proBNP c/w Vol Overload - Cardio eval in progress - old records and TTE reviewed - I and O, serial CE -   dietary discretion  Covid - in vaccinated pt - monitor VS and Sat - Acap and Robitussin PRN - monitor VS and HD and Sat - Decadron is indicated in covid with hypoxemia  pt is on Home Rx regimen of Symbicort - unclear indication - ex smoker - no history of COPD as per pt or medical record - prior CT without Emphysema  isolation for covid -

## 2022-01-11 NOTE — PROGRESS NOTE ADULT - ASSESSMENT
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 Found to be covid positive. Cardio consulted for elevated Troponins and CHF.    TTE:(10/21): Normal LV size with normal LVSF with LVEF 55-60%. LV diastolic function cannot determine due to atrial fibrillation. Mild mitral regurgitation is present. Mild aortic stenosis is  present. Mild tricuspid regurgitation is present . No evidence of any pulmonary hypertension    Volume overload (acute on chronic diastolic HF), CAD, HTN, Afib  -refusing tele, okay to keep off   -change lasix to PO , if okay with renal   - Continue to monitor strict I/O's, negative 1850 cc 24 hours   - Troponin likely demand ischemia in setting of above, plan for outpatient stress when underlying COVID  resolves however pt remains asymptomatic   - EKG NSR, no ischemic changes noted  - Hx of Afib continue home Eliquis.  rate-controlled   - For Hx of CAD, continue ASA, BB, and statin  - Continue Norvasc,Imdur, Hydralazine, Clonidine.   Coreg increased from 6.25mg BID to 12.5mg and Clonidine increased from 0.2mg BID to 0.3mg BID   -repeat bp after am medication   Monitor and replete electrolytes. Keep K>4.0 and Mg>2.0.  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 Found to be covid positive. Cardio consulted for elevated Troponins and CHF.    TTE:(10/21): Normal LV size with normal LVSF with LVEF 55-60%. LV diastolic function cannot determine due to atrial fibrillation. Mild mitral regurgitation is present. Mild aortic stenosis is  present. Mild tricuspid regurgitation is present . No evidence of any pulmonary hypertension    Volume overload (acute on chronic diastolic HF), CAD, HTN, Afib  - refusing tele, okay to keep off   - change lasix to PO , if okay with renal   - Continue to monitor strict I/O's, negative 1850 cc 24 hours   - Troponin likely demand ischemia in setting of above, plan for outpatient stress when underlying COVID  resolves however pt remains asymptomatic   - EKG NSR, no ischemic changes noted  - Hx of Afib continue home Eliquis.  rate-controlled   - For Hx of CAD, continue ASA, BB, and statin  - Continue Norvasc,Imdur, Hydralazine, Clonidine.   Coreg increased from 6.25mg BID to 12.5mg and Clonidine increased from 0.2mg BID to 0.3mg BID   - repeat bp after am medication   Monitor and replete electrolytes. Keep K>4.0 and Mg>2.0.  Carmelina Leger FNP-C  Cardiology NP  [facility] [phone]

## 2022-01-11 NOTE — PROGRESS NOTE ADULT - TIME BILLING
Note written by attending, see above.  Time spent: 37min. More than 50% of the visit was spent counseling the patient on medical condition - acute hypoxic and hypercarbic respiratory failure due to COVID-19 PNA, and acute on chronic diastolic CHF, hypertensive urgency, epistaxis, lasix, ARIELLA on CKD.     Called pt's sister's phone number as listed, but given error message that it is not a proper phone number. That is her number per pt.
Note written by attending, see above.  Time spent: 45min. More than 50% of the visit was spent counseling the patient on medical condition - acute hypoxic and hypercarbic respiratory failure due to COVID-19 PNA, and acute on chronic diastolic CHF, hypertensive urgency, epistaxis.     Called pt's sister's phone number as listed, but given error message that it is not a proper phone number. That is her number per pt.
Note written by attending, see above.  Time spent: 45min. More than 50% of the visit was spent counseling the patient on medical condition - acute hypoxic and hypercarbic respiratory failure due to COVID-19 PNA, and acute on chronic diastolic CHF, hypertensive urgency.     Called pt's sister's phone number as listed, but given error message that it is not a proper phone number.
Note written by attending, see above.  Time spent: 45min. More than 50% of the visit was spent counseling the patient on medical condition - acute hypoxic and hypercarbic respiratory failure due to COVID-19 PNA, and acute on chronic diastolic CHF, hypertensive urgency.     Called pt's sister's phone number as listed, but given error message that it is not a proper phone number.
Note written by attending, see above.  Time spent: 37min. More than 50% of the visit was spent counseling the patient on medical condition - acute hypoxic and hypercarbic respiratory failure due to COVID-19 PNA, and acute on chronic diastolic CHF, hypertensive urgency, epistaxis, lasix, ARIELLA on CKD.     Called pt's sister's phone number as listed, but given error message that it is not a proper phone number. That is her number per pt.
Note written by attending, see above.  Time spent: 37min. More than 50% of the visit was spent counseling the patient on medical condition - acute hypoxic and hypercarbic respiratory failure due to COVID-19 PNA, and acute on chronic diastolic CHF, hypertensive urgency, epistaxis, lasix, ARIELLA on CKD, anxiety.     Called pt's sister's phone number as listed, but given error message that it is not a proper phone number. That is her number per pt.
Note written by attending, see above.  Time spent: 37min. More than 50% of the visit was spent counseling the patient on medical condition - acute hypoxic and hypercarbic respiratory failure due to COVID-19 PNA, and acute on chronic diastolic CHF, hypertensive urgency, epistaxis, lasix, ARIELLA on CKD, anxiety.     Called pt's sister's phone number as listed, but given error message that it is not a proper phone number. That is her number per pt.

## 2022-01-11 NOTE — PROGRESS NOTE ADULT - SUBJECTIVE AND OBJECTIVE BOX
[facility] -- Fifi Bliss, Bonifacio, Kalpesh, Abraham Sheridan Savella  Office # [phone]      Follow Up:    CAD Elevated trop volume overload  Subjective/Observations:   No events overnight resting comfortably in bed.  No complaints of chest pain, dyspnea, or palpitations reported. No signs of orthopnea or PND.  Now on RA      REVIEW OF SYSTEMS: All other review of systems is negative unless indicated above    PAST MEDICAL & SURGICAL HISTORY:  HTN (hypertension)  c/b multiple episodes of hypertensive urgency    HLD (hyperlipidemia)    Atrial fibrillation  first documented on EKG 10/7/2021    CAD (coronary artery disease)  s/p stents (not on plavix)    Type 2 diabetes mellitus  not on home insulin/ Meds    Anxiety  Depression  multiple psych medications    History of diverticulitis  07/2021    Diverticulosis  c/b GIB in 2020    Afib  on AC    Stage 3 chronic kidney disease    Anemia of chronic disease  Monoclonal Gammopathy-MGUS  pRBC transfusion 10/15/21    Chronic diastolic congestive heart failure    Multiple thyroid nodules    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  Had surgery ( April 2013 )    S/P tonsillectomy    S/P arthroscopic knee surgery  Bilateral ( 2005 )    Torsion of testicle  Had surgery at age 14    Pilonidal cyst  Had surgery ( 1969 )    S/P cataract surgery  Bilateral    H/O hernia repair        MEDICATIONS  (STANDING):  amLODIPine   Tablet 10 milliGRAM(s) Oral daily  apixaban 5 milliGRAM(s) Oral two times a day  ARIPiprazole 30 milliGRAM(s) Oral daily  aspirin enteric coated 81 milliGRAM(s) Oral daily  atorvastatin 10 milliGRAM(s) Oral at bedtime  budesonide 160 MICROgram(s)/formoterol 4.5 MICROgram(s) Inhaler 2 Puff(s) Inhalation two times a day  carvedilol 12.5 milliGRAM(s) Oral every 12 hours  cefTRIAXone   IVPB 1000 milliGRAM(s) IV Intermittent every 24 hours  cloNIDine 0.3 milliGRAM(s) Oral two times a day  cyanocobalamin 1000 MICROGram(s) Oral daily  dexAMETHasone     Tablet 6 milliGRAM(s) Oral daily  dextrose 40% Gel 15 Gram(s) Oral once  dextrose 5%. 1000 milliLiter(s) (50 mL/Hr) IV Continuous <Continuous>  dextrose 5%. 1000 milliLiter(s) (100 mL/Hr) IV Continuous <Continuous>  dextrose 50% Injectable 25 Gram(s) IV Push once  dextrose 50% Injectable 12.5 Gram(s) IV Push once  dextrose 50% Injectable 25 Gram(s) IV Push once  famotidine    Tablet 20 milliGRAM(s) Oral daily  folic acid 1 milliGRAM(s) Oral daily  furosemide   Injectable 40 milliGRAM(s) IV Push two times a day  glucagon  Injectable 1 milliGRAM(s) IntraMuscular once  hydrALAZINE 100 milliGRAM(s) Oral three times a day  insulin lispro (ADMELOG) corrective regimen sliding scale   SubCutaneous at bedtime  insulin lispro (ADMELOG) corrective regimen sliding scale   SubCutaneous three times a day before meals  isosorbide   mononitrate ER Tablet (IMDUR) 120 milliGRAM(s) Oral daily  lamoTRIgine 100 milliGRAM(s) Oral daily  mirtazapine 30 milliGRAM(s) Oral at bedtime  oxybutynin 5 milliGRAM(s) Oral two times a day  pantoprazole    Tablet 40 milliGRAM(s) Oral before breakfast  senna 2 Tablet(s) Oral at bedtime  sodium chloride 0.65% Nasal 1 Spray(s) Both Nostrils four times a day  venlafaxine XR. 150 milliGRAM(s) Oral daily    MEDICATIONS  (PRN):  acetaminophen     Tablet .. 650 milliGRAM(s) Oral every 6 hours PRN Temp greater or equal to 38.5C (101.3F), Moderate Pain (4 - 6)  ALBUTerol    90 MICROgram(s) HFA Inhaler 2 Puff(s) Inhalation every 6 hours PRN Shortness of Breath and/or Wheezing      Allergies    No Known Allergies    Intolerances        Vital Signs Last 24 Hrs  T(C): 36.7 (11 Jan 2022 05:41), Max: 36.7 (10 [place]co 2022 20:12)  T(F): 98 (11 Jan 2022 05:41), Max: 98 (10 [place] 2022 20:12)  HR: 69 (11 Jan 2022 05:41) (64 - 88)  BP: 180/97 (11 Jan 2022 05:41) (137/84 - 180/97)  BP(mean): --  RR: 17 (11 Jan 2022 05:41) (17 - 18)  SpO2: 93% (11 Jan 2022 05:41) (93% - 99%)    I&O's Summary    10 [place] 2022 07:01  -  11 Jan 2022 07:00  --------------------------------------------------------  IN: 0 mL / OUT: 1850 mL / NET: -1850 mL          PHYSICAL EXAM:  TELE: not on tele   Constitutional: NAD, awake and alert,obese   HEENT: Moist Mucous Membranes, Anicteric  Pulmonary: Non-labored, breath sounds are clear bilaterally, No wheezing, crackles or rhonchi  Cardiovascular: Regular, S1 and S2 nl, No murmurs, rubs, gallops or clicks  Gastroint soft non tender  Skin: No visible rashes or ulcers., trace bl edema   Psych:  Mood & affect appropriate    LABS: All Labs Reviewed:                        9.6    7.70  )-----------( 300      ( 10 [place]co 2022 09:50 )             30.5                         8.7    6.28  )-----------( 249      ( 09 Jan 2022 09:49 )             27.9                         8.3    5.60  )-----------( 236      ( 08 Jan 2022 10:31 )             26.2     10 [place] 2022 09:50    141    |  100    |  42     ----------------------------<  180    3.3     |  33     |  2.20   09 Jan 2022 09:49    140    |  103    |  44     ----------------------------<  241    3.2     |  30     |  2.20   08 Jan 2022 10:31    143    |  107    |  44     ----------------------------<  140    3.4     |  28     |  2.10     Ca    8.6        10 [place]co 2022 09:50  Ca    8.8        09 Jan 2022 09:49  Ca    7.9        08 Jan 2022 10:31  Phos  3.0       09 Jan 2022 09:49  Mg     2.0       09 Jan 2022 09:49    TPro  6.5    /  Alb  3.0    /  TBili  0.4    /  DBili  x      /  AST  16     /  ALT  26     /  AlkPhos  60     10 [place] 2022 09:50  TPro  6.2    /  Alb  2.8    /  TBili  0.4    /  DBili  x      /  AST  16     /  ALT  25     /  AlkPhos  60     09 Jan 2022 09:49  TPro  5.9    /  Alb  2.6    /  TBili  0.2    /  DBili  x      /  AST  15     /  ALT  25     /  AlkPhos  56     08 Jan 2022 10:31     12 Lead ECG (01.03.22 @ 10:49) >    Ventricular Rate 94 BPM    Atrial Rate 91 BPM    QRS Duration 100 ms    Q-T Interval 378 ms    QTC Calculation(Bazett) 472 ms    R Axis -15 degrees    T Axis 261 degrees    Diagnosis Line Atrial fibrillation  Incomplete right bundle branch block  Moderate voltage criteria for LVH, may be normal variant  Nonspecific T wave abnormality  Prolonged QT  Abnormal ECG  Confirmed by Victorino Valdovinos MD ([id number]) on 1/3/2022 3:17:52 PM    < end of copied text >  < from: TTE Echo Complete w/o Contrast w/ Doppler (01.06.22 @ 10:52) >   EXAM:  ECHO TTE WO CON COMP W DOPP         PROCEDURE DATE:  01/06/2022        INTERPRETATION:  INDICATION: Dyspnea  Sonographer PH    Blood Pressure 177/86    Height 172.7 cm     Weight 104.4 kg    Dimensions:  LA 4.2       Normal Values: 2.0 - 4.0 cm  Ao 3.7        Normal Values: 2.0 - 3.8 cm  SEPTUM 1.4       Normal Values: 0.6 - 1.2 cm  PWT 1.4       Normal Values: 0.6 - 1.1 cm  LVIDd 5.2         Normal Values: 3.0 - 5.6 cm  LVIDs 4.2         Normal Values: 1.8 - 4.0 cm      OBSERVATIONS:  Technically difficult study  Mitral Valve: Mitral annular calcification with thickened leaflets, mild   MR.  Aortic Valve/Aorta: Calcified trileaflet aortic valve with decreased   opening. Peak transaortic valve gradient is 29.4 mmHg with a mean   transaortic valve gradient 14.7 mmHg. This consistent with mild aortic   stenosis.  Tricuspid Valve: Mild TR.  Pulmonic Valve: Not well-visualized  Left Atrium: Enlarged  Right Atrium: Not well-visualized  Left Ventricle: Moderate left ventricular hypertrophy with normal   systolic function, estimated LVEF of 55%.  Right Ventricle: Grossly normal size and systolic function.  Pericardium: no significant pericardial effusion.  Pulmonary/RV Pressure: estimated PA systolic pressure of 32mmHg        IMPRESSION:  Technically difficult study  Moderate left ventricular hypertrophy with normal systolic function,   estimated LVEF of 55%.  Grossly normal RV size and systolic function.  Calcified trileaflet aortic valve with mild aortic stenosis  Mild MR andTR.  No significant pericardial effusion.    --- End of Report --        < from: Xray Chest 1 View-PORTABLE IMMEDIATE (Xray Chest 1 View-PORTABLE IMMEDIATE .) (01.05.22 @ 08:14) >    ACC: [id number] EXAM:  XR CHEST PORTABLE IMMED 1V                        INTERPRETATION:  DATE OF STUDY: 1/5/22    PRIOR:12/24/21    CLINICAL INDICATION: New fever.    TECHNIQUE: portable chest.    FINDINGS:  There is stable cardiomegaly.  The left lung is clear.  Partial clearing of previously noted lower right lung airspace haziness.  New airspace opacity in mid-right lung just above right minor fissure.   New airspace opacity in upper right lung -these findings are concerning   for pneumonia right clinical setting.  No sizable pleural effusion. No pneumothorax.  Degenerative changes of the thoracic spine.    IMPRESSION:  Clear left lung.  New probable pneumonic infiltrates in mid and upper right lung.

## 2022-01-11 NOTE — PROGRESS NOTE ADULT - SUBJECTIVE AND OBJECTIVE BOX
Patient is a 76y old  Male who presents with a chief complaint of worsening SOB.            INTERVAL HPI/OVERNIGHT EVENTS: Pt states he feels "fine" with no SOB at rest. Pt reports only mild VALDEZ, which is improving day by day. No further epistaxis from right nares. Pt denies CP, fever, chills, palpitations. Admits having some anxiety with prolonged hospitalization.    MEDICATIONS  (STANDING):  amLODIPine   Tablet 10 milliGRAM(s) Oral daily  apixaban 5 milliGRAM(s) Oral two times a day  ARIPiprazole 30 milliGRAM(s) Oral daily  aspirin enteric coated 81 milliGRAM(s) Oral daily  atorvastatin 10 milliGRAM(s) Oral at bedtime  budesonide 160 MICROgram(s)/formoterol 4.5 MICROgram(s) Inhaler 2 Puff(s) Inhalation two times a day  carvedilol 12.5 milliGRAM(s) Oral every 12 hours  cloNIDine 0.3 milliGRAM(s) Oral two times a day  cyanocobalamin 1000 MICROGram(s) Oral daily  dexAMETHasone     Tablet 6 milliGRAM(s) Oral daily  dextrose 40% Gel 15 Gram(s) Oral once  dextrose 5%. 1000 milliLiter(s) (50 mL/Hr) IV Continuous <Continuous>  dextrose 5%. 1000 milliLiter(s) (100 mL/Hr) IV Continuous <Continuous>  dextrose 50% Injectable 25 Gram(s) IV Push once  dextrose 50% Injectable 12.5 Gram(s) IV Push once  dextrose 50% Injectable 25 Gram(s) IV Push once  famotidine    Tablet 20 milliGRAM(s) Oral daily  folic acid 1 milliGRAM(s) Oral daily  furosemide   Injectable 40 milliGRAM(s) IV Push two times a day  glucagon  Injectable 1 milliGRAM(s) IntraMuscular once  hydrALAZINE 100 milliGRAM(s) Oral three times a day  insulin lispro (ADMELOG) corrective regimen sliding scale   SubCutaneous three times a day before meals  insulin lispro (ADMELOG) corrective regimen sliding scale   SubCutaneous at bedtime  isosorbide   mononitrate ER Tablet (IMDUR) 120 milliGRAM(s) Oral daily  lamoTRIgine 100 milliGRAM(s) Oral daily  mirtazapine 30 milliGRAM(s) Oral at bedtime  oxybutynin 5 milliGRAM(s) Oral two times a day  pantoprazole    Tablet 40 milliGRAM(s) Oral before breakfast  senna 2 Tablet(s) Oral at bedtime  sodium chloride 0.65% Nasal 1 Spray(s) Both Nostrils four times a day  venlafaxine XR. 150 milliGRAM(s) Oral daily    MEDICATIONS  (PRN):  acetaminophen     Tablet .. 650 milliGRAM(s) Oral every 6 hours PRN Temp greater or equal to 38.5C (101.3F), Moderate Pain (4 - 6)  ALBUTerol    90 MICROgram(s) HFA Inhaler 2 Puff(s) Inhalation every 6 hours PRN Shortness of Breath and/or Wheezing          Allergies    No Known Allergies    Intolerances        REVIEW OF SYSTEMS:  CONSTITUTIONAL: No fever or chills  HEENT:  No headache, no sore throat  RESPIRATORY: No SOB at rest ; +VALDEZ (improving)  CARDIOVASCULAR: No chest pain, palpitations  GASTROINTESTINAL: No abd pain, nausea, vomiting, or diarrhea  GENITOURINARY: No dysuria, frequency, or hematuria  NEUROLOGICAL: no focal weakness or dizziness  MUSCULOSKELETAL: +leg swelling (improving) ; no myalgias   PSYCH: +anxiety    Vital Signs Last 24 Hrs  T(C): 36.7 (11 Jan 2022 05:41), Max: 36.7 (10 [place] 2022 20:12)  T(F): 98 (11 Jan 2022 05:41), Max: 98 (10 [place] 2022 20:12)  HR: 69 (11 Jan 2022 05:41) (64 - 88)  BP: 180/97 (11 Jan 2022 05:41) (137/84 - 180/97)  BP(mean): --  RR: 17 (11 Jan 2022 05:41) (17 - 18)  SpO2: 93% (11 Jan 2022 05:41) (93% - 99%)    PHYSICAL EXAM:  GENERAL: NAD at rest on NC  HEENT:  anicteric, moist mucous membranes  CHEST/LUNG:  decreased breath sounds b/l bases  HEART:  rrr, S1, S2, +murmur  ABDOMEN:  BS+, soft, nontender, nondistended  EXTREMITIES: + LE edema b/l, no cyanosis, or calf tenderness  NERVOUS SYSTEM: answers questions and follows commands appropriately    LABS:                                                              9.7    9.56  )-----------( 317      ( 11 Jan 2022 11:02 )             30.4     CBC Full  -  ( 11 Jan 2022 11:02 )  WBC Count : 9.56 K/uL  Hemoglobin : 9.7 g/dL  Hematocrit : 30.4 %  Platelet Count - Automated : 317 K/uL  Mean Cell Volume : 85.9 fl  Mean Cell Hemoglobin : 27.4 pg  Mean Cell Hemoglobin Concentration : 31.9 gm/dL  Auto Neutrophil # : 8.16 K/uL  Auto Lymphocyte # : 0.95 K/uL  Auto Monocyte # : 0.23 K/uL  Auto Eosinophil # : 0.05 K/uL  Auto Basophil # : 0.02 K/uL  Auto Neutrophil % : 85.4 %  Auto Lymphocyte % : 9.9 %  Auto Monocyte % : 2.4 %  Auto Eosinophil % : 0.5 %  Auto Basophil % : 0.2 %    01-11    140  |  100  |  39<H>  ----------------------------<  249<H>  3.7   |  33<H>  |  2.30<H>    Ca    8.2<L>      11 Jan 2022 11:02  Mg     2.5     01-11    TPro  5.9<L>  /  Alb  2.7<L>  /  TBili  0.3  /  DBili  x   /  AST  11<L>  /  ALT  21  /  AlkPhos  58  01-11                  Culture - Blood (collected 01-03-22 @ 16:15)  Source: .Blood Blood-Peripheral  Preliminary Report (01-04-22 @ 17:02):    No growth to date.    Culture - Blood (collected 01-03-22 @ 16:15)  Source: .Blood Blood-Peripheral  Preliminary Report (01-04-22 @ 17:02):    No growth to date.    Culture - Urine (collected 01-03-22 @ 16:12)  Source: Clean Catch Clean Catch (Midstream)  Final Report (01-04-22 @ 13:40):    No growth        RADIOLOGY & ADDITIONAL TESTS:    Personally reviewed.     Consultant(s) Notes Reviewed:  [x] YES  [ ] NO     Patient is a 76y old  Male who presents with a chief complaint of worsening SOB.             INTERVAL HPI/OVERNIGHT EVENTS: Pt states he feels "fine" with no SOB at rest. Pt reports only mild VALDEZ, which is improving day by day. No further epistaxis from right nares. Pt denies CP, fever, chills, palpitations. Admits having some anxiety with prolonged hospitalization.    MEDICATIONS  (STANDING):  amLODIPine   Tablet 10 milliGRAM(s) Oral daily  apixaban 5 milliGRAM(s) Oral two times a day  ARIPiprazole 30 milliGRAM(s) Oral daily  aspirin enteric coated 81 milliGRAM(s) Oral daily  atorvastatin 10 milliGRAM(s) Oral at bedtime  budesonide 160 MICROgram(s)/formoterol 4.5 MICROgram(s) Inhaler 2 Puff(s) Inhalation two times a day  carvedilol 12.5 milliGRAM(s) Oral every 12 hours  cloNIDine 0.3 milliGRAM(s) Oral two times a day  cyanocobalamin 1000 MICROGram(s) Oral daily  dexAMETHasone     Tablet 6 milliGRAM(s) Oral daily  dextrose 40% Gel 15 Gram(s) Oral once  dextrose 5%. 1000 milliLiter(s) (50 mL/Hr) IV Continuous <Continuous>  dextrose 5%. 1000 milliLiter(s) (100 mL/Hr) IV Continuous <Continuous>  dextrose 50% Injectable 25 Gram(s) IV Push once  dextrose 50% Injectable 12.5 Gram(s) IV Push once  dextrose 50% Injectable 25 Gram(s) IV Push once  famotidine    Tablet 20 milliGRAM(s) Oral daily  folic acid 1 milliGRAM(s) Oral daily  furosemide   Injectable 40 milliGRAM(s) IV Push two times a day  glucagon  Injectable 1 milliGRAM(s) IntraMuscular once  hydrALAZINE 100 milliGRAM(s) Oral three times a day  insulin lispro (ADMELOG) corrective regimen sliding scale   SubCutaneous three times a day before meals  insulin lispro (ADMELOG) corrective regimen sliding scale   SubCutaneous at bedtime  isosorbide   mononitrate ER Tablet (IMDUR) 120 milliGRAM(s) Oral daily  lamoTRIgine 100 milliGRAM(s) Oral daily  mirtazapine 30 milliGRAM(s) Oral at bedtime  oxybutynin 5 milliGRAM(s) Oral two times a day  pantoprazole    Tablet 40 milliGRAM(s) Oral before breakfast  senna 2 Tablet(s) Oral at bedtime  sodium chloride 0.65% Nasal 1 Spray(s) Both Nostrils four times a day  venlafaxine XR. 150 milliGRAM(s) Oral daily    MEDICATIONS  (PRN):  acetaminophen     Tablet .. 650 milliGRAM(s) Oral every 6 hours PRN Temp greater or equal to 38.5C (101.3F), Moderate Pain (4 - 6)  ALBUTerol    90 MICROgram(s) HFA Inhaler 2 Puff(s) Inhalation every 6 hours PRN Shortness of Breath and/or Wheezing          Allergies    No Known Allergies    Intolerances        REVIEW OF SYSTEMS:  CONSTITUTIONAL: No fever or chills  HEENT:  No headache, no sore throat  RESPIRATORY: No SOB at rest ; +VALDEZ (improving)  CARDIOVASCULAR: No chest pain, palpitations  GASTROINTESTINAL: No abd pain, nausea, vomiting, or diarrhea  GENITOURINARY: No dysuria, frequency, or hematuria  NEUROLOGICAL: no focal weakness or dizziness  MUSCULOSKELETAL: +leg swelling (improving) ; no myalgias   PSYCH: +anxiety    Vital Signs Last 24 Hrs  T(C): 36.7 (11 Jan 2022 05:41), Max: 36.7 (10 [place] 2022 20:12)  T(F): 98 (11 Jan 2022 05:41), Max: 98 (10 [place] 2022 20:12)  HR: 69 (11 Jan 2022 05:41) (64 - 88)  BP: 180/97 (11 Jan 2022 05:41) (137/84 - 180/97)  BP(mean): --  RR: 17 (11 Jan 2022 05:41) (17 - 18)  SpO2: 93% (11 Jan 2022 05:41) (93% - 99%)    PHYSICAL EXAM:  GENERAL: NAD at rest on NC  HEENT:  anicteric, moist mucous membranes  CHEST/LUNG:  decreased breath sounds b/l bases  HEART:  rrr, S1, S2, +murmur  ABDOMEN:  BS+, soft, nontender, nondistended  EXTREMITIES: + LE edema b/l, no cyanosis, or calf tenderness  NERVOUS SYSTEM: answers questions and follows commands appropriately    LABS:                                                              9.7    9.56  )-----------( 317      ( 11 Jan 2022 11:02 )             30.4     CBC Full  -  ( 11 Jan 2022 11:02 )  WBC Count : 9.56 K/uL  Hemoglobin : 9.7 g/dL  Hematocrit : 30.4 %  Platelet Count - Automated : 317 K/uL  Mean Cell Volume : 85.9 fl  Mean Cell Hemoglobin : 27.4 pg  Mean Cell Hemoglobin Concentration : 31.9 gm/dL  Auto Neutrophil # : 8.16 K/uL  Auto Lymphocyte # : 0.95 K/uL  Auto Monocyte # : 0.23 K/uL  Auto Eosinophil # : 0.05 K/uL  Auto Basophil # : 0.02 K/uL  Auto Neutrophil % : 85.4 %  Auto Lymphocyte % : 9.9 %  Auto Monocyte % : 2.4 %  Auto Eosinophil % : 0.5 %  Auto Basophil % : 0.2 %    01-11    140  |  100  |  39<H>  ----------------------------<  249<H>  3.7   |  33<H>  |  2.30<H>    Ca    8.2<L>      11 Jan 2022 11:02  Mg     2.5     01-11    TPro  5.9<L>  /  Alb  2.7<L>  /  TBili  0.3  /  DBili  x   /  AST  11<L>  /  ALT  21  /  AlkPhos  58  01-11                  Culture - Blood (collected 01-03-22 @ 16:15)  Source: .Blood Blood-Peripheral  Preliminary Report (01-04-22 @ 17:02):    No growth to date.    Culture - Blood (collected 01-03-22 @ 16:15)  Source: .Blood Blood-Peripheral  Preliminary Report (01-04-22 @ 17:02):    No growth to date.    Culture - Urine (collected 01-03-22 @ 16:12)  Source: Clean Catch Clean Catch (Midstream)  Final Report (01-04-22 @ 13:40):    No growth        RADIOLOGY & ADDITIONAL TESTS:    Personally reviewed.     Consultant(s) Notes Reviewed:  [x] YES  [ ] NO

## 2022-01-11 NOTE — PROGRESS NOTE ADULT - ASSESSMENT
77yo M from group home, with PMH of DM2, HTN, HLD, CAD (s/p stents), Afib (on Eliquis), stage 3 CKD, hx of MGUS; a/w acute hypoxic and hypercarbic respiratory failure due to COVID-19 PNA, and acute on chronic diastolic CHF.    Acute hypoxic and hypercarbic respiratory failure due to COVID19 PNA and acute CHF:  -blood gas improved significantly   -completed remdesivir 5-day course  -continue dexamethasone 6mg daily - plan for 10-day course (day 8/10)  -has been on NC - now tolerated RA at rest currently - check with ambulation  -airborne/contact precautions  -trending CBC diff, CMP, and  inflammatory markers  -Pulm/ID following ([facility]), recs appreciated  -GI ppx with protonix while on decadron    Acute on chronic diastolic CHF:  - has been on lasix 40mg IV BID; strict I/Os; monitoring hemodynamics/renal function w/diuresis  - will transition to lasix 40mg PO BID this evening  - monitor O2, daily weights   - cardio ([facility]), recs appreciated    Hypertensive urgency:  - continue coreg, clonidine, norvasc, hydralazine, lasix  - BP control improving after increasing clonidine to 0.3mg po BID and increasing coreg to 12.5mg po BID   - cardio ([facility]), recs appreciated  - monitor BP    Acute epistaxis:  - likely was mucosal bleed from drying O2 and bled longer due to Eliquis, ASA, and elevated BUN  - bleeding controlled with Afrin/lidocaine soaked cotton with pressure for 5 minutes per ENT recs  - ENT ([facility]), recs appreciated  - c/w humidified O2 and Ocean spray    ARIELLA on CKD 3:  - baseline Cr appears to be ~2.0 - renally dose meds, no nephrotoxics  - Cr up to 2.6, but now improved while on lasix and stable ~2.2  - nephro ([facility]), recs appreciated    Anemia:  - pt with hx of MGUS and CKD likely contributing to anemia, along with current acute illness  - Hgb had trended down to 7.5, now improving and >8  - no gross hematuria, hematochezia, melena; monitor for signs of blood loss  - discussed blood transfusion with the pt who is hesitant as he is feeling overall better  - Retacrit per nephro orders for the renal failure component of anemia   - found to be iron deficient, so given venofer 100mg IV q24h x3 doses  - goal Hgb > 8    Afib:  - c/w Eliquis  - c/w coreg  - cardio ([facility]), recs appreciated    Type 2 DM:  - goal glucose 100-180  - monitor FSG lazara as pt on decadron   - c/w HISS    psych - continue abilify, lamictal, effexor  - has had some anxiety and given ativan 0.5mg po x1     DVT prophy   - c/w Eliquis    Disp: per SW, pt's group home cannot accept him if he is COVID positive -- repeat PCR tomorrow morning

## 2022-01-11 NOTE — PROVIDER CONTACT NOTE (OTHER) - SITUATION
pt. refusing remote tele monitoring
Temp 100.5, B/P 186/84
pt. refusing Remote tele monitoring
Pt breathing heavily w/ audible expiratory wheezing

## 2022-01-12 ENCOUNTER — APPOINTMENT (OUTPATIENT)
Dept: CARDIOLOGY | Facility: CLINIC | Age: 77
End: 2022-01-12

## 2022-01-12 LAB
ANION GAP SERPL CALC-SCNC: 10 MMOL/L — SIGNIFICANT CHANGE UP (ref 5–17)
BASOPHILS # BLD AUTO: 0.02 K/UL — SIGNIFICANT CHANGE UP (ref 0–0.2)
BASOPHILS NFR BLD AUTO: 0.2 % — SIGNIFICANT CHANGE UP (ref 0–2)
BUN SERPL-MCNC: 44 MG/DL — HIGH (ref 7–23)
CALCIUM SERPL-MCNC: 8.6 MG/DL — SIGNIFICANT CHANGE UP (ref 8.5–10.1)
CHLORIDE SERPL-SCNC: 101 MMOL/L — SIGNIFICANT CHANGE UP (ref 96–108)
CO2 SERPL-SCNC: 29 MMOL/L — SIGNIFICANT CHANGE UP (ref 22–31)
CREAT SERPL-MCNC: 2.3 MG/DL — HIGH (ref 0.5–1.3)
EOSINOPHIL # BLD AUTO: 0.07 K/UL — SIGNIFICANT CHANGE UP (ref 0–0.5)
EOSINOPHIL NFR BLD AUTO: 0.6 % — SIGNIFICANT CHANGE UP (ref 0–6)
GLUCOSE SERPL-MCNC: 140 MG/DL — HIGH (ref 70–99)
HCT VFR BLD CALC: 31.8 % — LOW (ref 39–50)
HGB BLD-MCNC: 9.9 G/DL — LOW (ref 13–17)
IMM GRANULOCYTES NFR BLD AUTO: 0.8 % — SIGNIFICANT CHANGE UP (ref 0–1.5)
LYMPHOCYTES # BLD AUTO: 1.89 K/UL — SIGNIFICANT CHANGE UP (ref 1–3.3)
LYMPHOCYTES # BLD AUTO: 16.4 % — SIGNIFICANT CHANGE UP (ref 13–44)
MCHC RBC-ENTMCNC: 27 PG — SIGNIFICANT CHANGE UP (ref 27–34)
MCHC RBC-ENTMCNC: 31.1 GM/DL — LOW (ref 32–36)
MCV RBC AUTO: 86.9 FL — SIGNIFICANT CHANGE UP (ref 80–100)
MONOCYTES # BLD AUTO: 0.65 K/UL — SIGNIFICANT CHANGE UP (ref 0–0.9)
MONOCYTES NFR BLD AUTO: 5.6 % — SIGNIFICANT CHANGE UP (ref 2–14)
NEUTROPHILS # BLD AUTO: 8.8 K/UL — HIGH (ref 1.8–7.4)
NEUTROPHILS NFR BLD AUTO: 76.4 % — SIGNIFICANT CHANGE UP (ref 43–77)
NRBC # BLD: 0 /100 WBCS — SIGNIFICANT CHANGE UP (ref 0–0)
PLATELET # BLD AUTO: 332 K/UL — SIGNIFICANT CHANGE UP (ref 150–400)
POTASSIUM SERPL-MCNC: 3.4 MMOL/L — LOW (ref 3.5–5.3)
POTASSIUM SERPL-SCNC: 3.4 MMOL/L — LOW (ref 3.5–5.3)
RBC # BLD: 3.66 M/UL — LOW (ref 4.2–5.8)
RBC # FLD: 18.1 % — HIGH (ref 10.3–14.5)
SARS-COV-2 RNA SPEC QL NAA+PROBE: DETECTED
SODIUM SERPL-SCNC: 140 MMOL/L — SIGNIFICANT CHANGE UP (ref 135–145)
WBC # BLD: 11.52 K/UL — HIGH (ref 3.8–10.5)
WBC # FLD AUTO: 11.52 K/UL — HIGH (ref 3.8–10.5)

## 2022-01-12 PROCEDURE — 99232 SBSQ HOSP IP/OBS MODERATE 35: CPT

## 2022-01-12 RX ORDER — POTASSIUM CHLORIDE 20 MEQ
40 PACKET (EA) ORAL EVERY 4 HOURS
Refills: 0 | Status: COMPLETED | OUTPATIENT
Start: 2022-01-12 | End: 2022-01-12

## 2022-01-12 RX ORDER — ONDANSETRON 8 MG/1
4 TABLET, FILM COATED ORAL ONCE
Refills: 0 | Status: COMPLETED | OUTPATIENT
Start: 2022-01-12 | End: 2022-01-12

## 2022-01-12 RX ADMIN — CARVEDILOL PHOSPHATE 12.5 MILLIGRAM(S): 80 CAPSULE, EXTENDED RELEASE ORAL at 17:11

## 2022-01-12 RX ADMIN — BUDESONIDE AND FORMOTEROL FUMARATE DIHYDRATE 2 PUFF(S): 160; 4.5 AEROSOL RESPIRATORY (INHALATION) at 17:12

## 2022-01-12 RX ADMIN — APIXABAN 5 MILLIGRAM(S): 2.5 TABLET, FILM COATED ORAL at 06:23

## 2022-01-12 RX ADMIN — CARVEDILOL PHOSPHATE 12.5 MILLIGRAM(S): 80 CAPSULE, EXTENDED RELEASE ORAL at 06:23

## 2022-01-12 RX ADMIN — Medication 5 MILLIGRAM(S): at 17:13

## 2022-01-12 RX ADMIN — ISOSORBIDE MONONITRATE 120 MILLIGRAM(S): 60 TABLET, EXTENDED RELEASE ORAL at 11:40

## 2022-01-12 RX ADMIN — Medication 40 MILLIEQUIVALENT(S): at 14:20

## 2022-01-12 RX ADMIN — Medication 40 MILLIGRAM(S): at 17:11

## 2022-01-12 RX ADMIN — Medication 100 MILLIGRAM(S): at 14:20

## 2022-01-12 RX ADMIN — Medication 1 SPRAY(S): at 21:22

## 2022-01-12 RX ADMIN — Medication 1 SPRAY(S): at 06:24

## 2022-01-12 RX ADMIN — MIRTAZAPINE 30 MILLIGRAM(S): 45 TABLET, ORALLY DISINTEGRATING ORAL at 21:22

## 2022-01-12 RX ADMIN — SENNA PLUS 2 TABLET(S): 8.6 TABLET ORAL at 21:21

## 2022-01-12 RX ADMIN — PREGABALIN 1000 MICROGRAM(S): 225 CAPSULE ORAL at 11:40

## 2022-01-12 RX ADMIN — Medication 100 MILLIGRAM(S): at 21:21

## 2022-01-12 RX ADMIN — Medication 0.3 MILLIGRAM(S): at 06:23

## 2022-01-12 RX ADMIN — Medication 0.3 MILLIGRAM(S): at 17:11

## 2022-01-12 RX ADMIN — Medication 5 MILLIGRAM(S): at 06:22

## 2022-01-12 RX ADMIN — Medication 1 MILLIGRAM(S): at 11:40

## 2022-01-12 RX ADMIN — ATORVASTATIN CALCIUM 10 MILLIGRAM(S): 80 TABLET, FILM COATED ORAL at 21:22

## 2022-01-12 RX ADMIN — ARIPIPRAZOLE 30 MILLIGRAM(S): 15 TABLET ORAL at 11:40

## 2022-01-12 RX ADMIN — Medication 4: at 11:58

## 2022-01-12 RX ADMIN — Medication 40 MILLIGRAM(S): at 06:26

## 2022-01-12 RX ADMIN — FAMOTIDINE 20 MILLIGRAM(S): 10 INJECTION INTRAVENOUS at 11:40

## 2022-01-12 RX ADMIN — Medication 40 MILLIEQUIVALENT(S): at 17:11

## 2022-01-12 RX ADMIN — PANTOPRAZOLE SODIUM 40 MILLIGRAM(S): 20 TABLET, DELAYED RELEASE ORAL at 06:22

## 2022-01-12 RX ADMIN — APIXABAN 5 MILLIGRAM(S): 2.5 TABLET, FILM COATED ORAL at 17:11

## 2022-01-12 RX ADMIN — Medication 100 MILLIGRAM(S): at 05:42

## 2022-01-12 RX ADMIN — Medication 150 MILLIGRAM(S): at 11:40

## 2022-01-12 RX ADMIN — BUDESONIDE AND FORMOTEROL FUMARATE DIHYDRATE 2 PUFF(S): 160; 4.5 AEROSOL RESPIRATORY (INHALATION) at 06:23

## 2022-01-12 RX ADMIN — Medication 81 MILLIGRAM(S): at 11:40

## 2022-01-12 RX ADMIN — ONDANSETRON 4 MILLIGRAM(S): 8 TABLET, FILM COATED ORAL at 21:21

## 2022-01-12 RX ADMIN — AMLODIPINE BESYLATE 10 MILLIGRAM(S): 2.5 TABLET ORAL at 06:23

## 2022-01-12 RX ADMIN — Medication 0.5 MILLIGRAM(S): at 19:23

## 2022-01-12 RX ADMIN — Medication 2: at 17:11

## 2022-01-12 RX ADMIN — LAMOTRIGINE 100 MILLIGRAM(S): 25 TABLET, ORALLY DISINTEGRATING ORAL at 11:40

## 2022-01-12 RX ADMIN — Medication 6 MILLIGRAM(S): at 06:22

## 2022-01-12 NOTE — PROGRESS NOTE ADULT - SUBJECTIVE AND OBJECTIVE BOX
Patient is a 76y old  Male who presents with a chief complaint of CHF exacerbation (05 Jan 2022 12:02)    Patient seen in follow up for CKD 4.        PAST MEDICAL HISTORY:  Hypertension    Diabetes mellitus    Lupus    Hepatitis C    Anxiety and depression    CAD (coronary artery disease)    Diverticulosis    Hyperlipidemia    HTN (hypertension)    HLD (hyperlipidemia)    Atrial fibrillation    CAD (coronary artery disease)    Type 2 diabetes mellitus    Anxiety    History of diverticulitis    Diverticulosis    Afib    Stage 3 chronic kidney disease    Anemia of chronic disease    Chronic diastolic congestive heart failure    Multiple thyroid nodules      MEDICATIONS  (STANDING):  amLODIPine   Tablet 10 milliGRAM(s) Oral daily  apixaban 5 milliGRAM(s) Oral two times a day  ARIPiprazole 30 milliGRAM(s) Oral daily  aspirin enteric coated 81 milliGRAM(s) Oral daily  atorvastatin 10 milliGRAM(s) Oral at bedtime  budesonide 160 MICROgram(s)/formoterol 4.5 MICROgram(s) Inhaler 2 Puff(s) Inhalation two times a day  carvedilol 12.5 milliGRAM(s) Oral every 12 hours  cloNIDine 0.3 milliGRAM(s) Oral two times a day  cyanocobalamin 1000 MICROGram(s) Oral daily  dexAMETHasone     Tablet 6 milliGRAM(s) Oral daily  dextrose 40% Gel 15 Gram(s) Oral once  dextrose 5%. 1000 milliLiter(s) (50 mL/Hr) IV Continuous <Continuous>  dextrose 5%. 1000 milliLiter(s) (100 mL/Hr) IV Continuous <Continuous>  dextrose 50% Injectable 25 Gram(s) IV Push once  dextrose 50% Injectable 12.5 Gram(s) IV Push once  dextrose 50% Injectable 25 Gram(s) IV Push once  famotidine    Tablet 20 milliGRAM(s) Oral daily  folic acid 1 milliGRAM(s) Oral daily  furosemide    Tablet 40 milliGRAM(s) Oral two times a day  glucagon  Injectable 1 milliGRAM(s) IntraMuscular once  hydrALAZINE 100 milliGRAM(s) Oral three times a day  insulin lispro (ADMELOG) corrective regimen sliding scale   SubCutaneous three times a day before meals  insulin lispro (ADMELOG) corrective regimen sliding scale   SubCutaneous at bedtime  isosorbide   mononitrate ER Tablet (IMDUR) 120 milliGRAM(s) Oral daily  lamoTRIgine 100 milliGRAM(s) Oral daily  mirtazapine 30 milliGRAM(s) Oral at bedtime  oxybutynin 5 milliGRAM(s) Oral two times a day  pantoprazole    Tablet 40 milliGRAM(s) Oral before breakfast  potassium chloride    Tablet ER 40 milliEquivalent(s) Oral every 4 hours  senna 2 Tablet(s) Oral at bedtime  sodium chloride 0.65% Nasal 1 Spray(s) Both Nostrils four times a day  venlafaxine XR. 150 milliGRAM(s) Oral daily    MEDICATIONS  (PRN):  acetaminophen     Tablet .. 650 milliGRAM(s) Oral every 6 hours PRN Temp greater or equal to 38.5C (101.3F), Moderate Pain (4 - 6)  ALBUTerol    90 MICROgram(s) HFA Inhaler 2 Puff(s) Inhalation every 6 hours PRN Shortness of Breath and/or Wheezing    T(C): 36.3 (01-12-22 @ 11:38), Max: 36.7 (01-10-22 @ 20:12)  HR: 66 (01-12-22 @ 14:19) (58 - 88)  BP: 153/88 (01-12-22 @ 14:19) (129/71 - 180/97)  RR: 18 (01-12-22 @ 11:38)  SpO2: 94% (01-12-22 @ 14:28)  Wt(kg): --  I&O's Detail    11 Jan 2022 07:01  -  12 Jan 2022 07:00  --------------------------------------------------------  IN:  Total IN: 0 mL    OUT:    Voided (mL): 1000 mL  Total OUT: 1000 mL    Total NET: -1000 mL              PHYSICAL EXAM:  General: No distress  Respiratory: b/l air entry  Cardiovascular: S1 S2  Gastrointestinal: soft  Extremities:  + edema                             LABORATORY:                        9.9    11.52 )-----------( 332      ( 12 Jan 2022 07:45 )             31.8     01-12    140  |  101  |  44<H>  ----------------------------<  140<H>  3.4<L>   |  29  |  2.30<H>    Ca    8.6      12 Jan 2022 07:45  Mg     2.5     01-11    TPro  5.9<L>  /  Alb  2.7<L>  /  TBili  0.3  /  DBili  x   /  AST  11<L>  /  ALT  21  /  AlkPhos  58  01-11    Sodium, Serum: 140 mmol/L (01-12 @ 07:45)  Sodium, Serum: 140 mmol/L (01-11 @ 11:02)    Potassium, Serum: 3.4 mmol/L (01-12 @ 07:45)  Potassium, Serum: 3.7 mmol/L (01-11 @ 11:02)    Hemoglobin: 9.9 g/dL (01-12 @ 07:45)  Hemoglobin: 9.7 g/dL (01-11 @ 11:02)  Hemoglobin: 9.6 g/dL (01-10 @ 09:50)    Creatinine, Serum 2.30 (01-12 @ 07:45)  Creatinine, Serum 2.30 (01-11 @ 11:02)  Creatinine, Serum 2.20 (01-10 @ 09:50)        LIVER FUNCTIONS - ( 11 Jan 2022 11:02 )  Alb: 2.7 g/dL / Pro: 5.9 g/dL / ALK PHOS: 58 U/L / ALT: 21 U/L / AST: 11 U/L / GGT: x

## 2022-01-12 NOTE — SWALLOW BEDSIDE ASSESSMENT ADULT - ASR SWALLOW RECOMMEND DIAG
to assess safest diet level d/t Patient c/o pharyngeal stasis/swallowing difficulty during meals/VFSS/MBS

## 2022-01-12 NOTE — SWALLOW BEDSIDE ASSESSMENT ADULT - SWALLOW EVAL: DIAGNOSIS
1. Patient demonstrates a functional oral phase for pureed, regular solids, moderately thick, mildly thick, and thin liquids marked by adequate oral acceptance with timely collection, transfer, and transport. 2. Patient demonstrates a mild pharyngeal dysphagia for pureed, regular solids, moderately thick, mildly thick, and thin liquids marked by suspected delayed pharyngeal swallow trigger and hyolaryngeal elevation noted by digital palpation without evidence of airway penetration/aspiration. 3. Recommend regular and thin liquids, via single small cup sips only!, with aspiration precautions, as tolerated. Recommend formal swallow study of MBSS to assess safest diet level d/t Patient c/o of pharyngeal stasis and swallow difficulty today.

## 2022-01-12 NOTE — PROGRESS NOTE ADULT - ASSESSMENT
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 presenting to [facility] with shortness of breath and leg swelling. Found to be covid positive. Cardio consulted for elevated Troponins and CHF.    TTE:(10/21): Normal LV size with normal LVSF with LVEF 55-60%. LV diastolic function cannot determine due to atrial fibrillation. Mild mitral regurgitation is present. Mild aortic stenosis is  present. Mild tricuspid regurgitation is present . No evidence of any pulmonary hypertension    Volume overload (acute on chronic diastolic HF), CAD, HTN, Afib  - refusing tele, okay to keep off   - on PO lasix   - Continue to monitor strict I/O's, negative 1850 cc 24 hours   - Troponin likely demand ischemia in setting of above, plan for outpatient stress when underlying COVID  resolves however pt remains asymptomatic   - EKG NSR, no ischemic changes noted  - Hx of Afib continue home Eliquis.  rate-controlled   - For Hx of CAD, continue ASA, BB, and statin  - Bp 129/71;  Norvasc, Imdur, Hydralazine, Clonidine.   Coreg and Clonidine  Monitor and replete electrolytes. Keep K>4.0 and Mg>2.0.  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 Found to be covid positive. Cardio consulted for elevated Troponins and CHF.    TTE:(10/21): Normal LV size with normal LVSF with LVEF 55-60%. LV diastolic function cannot determine due to atrial fibrillation. Mild mitral regurgitation is present. Mild aortic stenosis is  present. Mild tricuspid regurgitation is present . No evidence of any pulmonary hypertension    Volume overload (acute on chronic diastolic HF), CAD, HTN, Afib  - refusing tele, okay to keep off   - on PO lasix   - Continue to monitor strict I/O's, negative 1850 cc 24 hours   - Troponin likely demand ischemia in setting of above, plan for outpatient stress when underlying COVID  resolves however pt remains asymptomatic   - EKG NSR, no ischemic changes noted  - Hx of Afib continue home Eliquis.  rate-controlled   - For Hx of CAD, continue ASA, BB, and statin  - Bp 129/71;  Norvasc, Imdur, Hydralazine, Clonidine.   Coreg and Clonidine  Dc planning   Monitor and replete electrolytes. Keep K>4.0 and Mg>2.0.  Carmelina Leger FNP-C  Cardiology NP  [facility] [phone]

## 2022-01-12 NOTE — PROVIDER CONTACT NOTE (OTHER) - RECOMMENDATIONS
Notify MD. Pt encouraged to not drink any more diet ginger ale (pt has had 8 since 19:00). Pt encouraged to not get OOB for own safety.
Speech and swallow evaluation
Notify MD

## 2022-01-12 NOTE — PROVIDER CONTACT NOTE (OTHER) - DATE AND TIME:
10-[place]-2022 13:40
12-Jan-2022 15:13
09-Jan-2022 10:30
10-[place]-2022 14:27
10-[place]co-2022 16:25
10-[place]co-2022 19:00
10-[place]-2022 19:35
05-Jan-2022 06:40
05-Jan-2022 01:25

## 2022-01-12 NOTE — SWALLOW BEDSIDE ASSESSMENT ADULT - COMMENTS
Consult received and chart reviewed. Patient seen at bedside this afternoon for initial assessment of swallow function. Patient was alert, cooperative, and denied pain. Patient demonstrates ability to follow simple commands. Vocal quality WFL. As per discussion with RN, Patient with emesis x2 during meals today. Patient reported feelings of pharyngeal stasis and difficulty swallowing during meals today.     Per charting, Patient is a "77yo M from [facility], with PMH of DM2, HTN, HLD, CAD (s/p stents), Afib (on Eliquis), stage 3 CKD, hx of MGUS; a/w acute hypoxic and hypercarbic respiratory failure due to COVID-19 PNA, and acute on chronic diastolic CHF." WBC elevated. CXR 1/5/22 revealed "Clear left lung. New probable pneumonic infiltrates in mid and upper right lung."    Patient familiar to this department from prior admission and was seen for MBSS on 10/20/21 with recommendations of "1. Dysphagia 3 (soft solids) and thin liquids, as tolerated 2. Thin liquids via single, small cup sips only" (see reports for details).     Discussed results and recommendations with Patient, RN, and call out to MD.

## 2022-01-12 NOTE — PROVIDER CONTACT NOTE (OTHER) - ACTION/TREATMENT ORDERED:
Dr. Christianson notified of above and stated he would order a Speech and swallow evaluation. Will continue to monitor pt.

## 2022-01-12 NOTE — PROVIDER CONTACT NOTE (OTHER) - REASON
Temp 100.5, B/P 186/84
pt. refusing remote tele monitoring
palpitations
pt. refusing Remote tele monitoring
anxious
remote tele
Pt had two episodes of vomitus, each at meal time. Pt states he feels like he is "choking" on his food.
remote tele
Pt breathing heavily w/ audible expiratory wheezing

## 2022-01-12 NOTE — PROGRESS NOTE ADULT - SUBJECTIVE AND OBJECTIVE BOX
CHIEF COMPLAINT/INTERVAL HISTORY:  Pt. seen and evaluated for acute hypoxic respiratory failure 2/2 COVID pneumonia and acute on chronic diastolic CHF.  Pt. is in no distress.  On room air with SpO2 in 90th%.  Denies having any shortness of breath.  Tolerating decadron.      REVIEW OF SYSTEMS:  No fever, CP, SOB, or abdominal pain     Vital Signs Last 24 Hrs  T(C): 36.3 (12 Jan 2022 11:38), Max: 36.4 (11 Jan 2022 21:15)  T(F): 97.3 (12 Jan 2022 11:38), Max: 97.6 (12 Jan 2022 05:50)  HR: 65 (12 Jan 2022 11:38) (58 - 70)  BP: 179/94 (12 Jan 2022 11:38) (129/71 - 179/94)  BP(mean): --  RR: 18 (12 Jan 2022 11:38) (18 - 19)  SpO2: 95% (12 Jan 2022 11:38) (92% - 96%)    PHYSICAL EXAM:  GENERAL: NAD  HEENT: EOMI, hearing normal, conjunctiva and sclera clear  Chest: Diminished BS bilaterally, no wheezing  CV: S1S2, RRR,   GI: soft, +BS, NT/ND  Musculoskeletal: trace LE edema  Psychiatric: affect nL, mood nL  Skin: warm and dry    LABS:                        9.9    11.52 )-----------( 332      ( 12 Jan 2022 07:45 )             31.8     01-12    140  |  101  |  44<H>  ----------------------------<  140<H>  3.4<L>   |  29  |  2.30<H>    Ca    8.6      12 Jan 2022 07:45  Mg     2.5     01-11    TPro  5.9<L>  /  Alb  2.7<L>  /  TBili  0.3  /  DBili  x   /  AST  11<L>  /  ALT  21  /  AlkPhos  58  01-11          Assessment and Plan:  77yo M from group home, with PMH of DM2, HTN, HLD, CAD (s/p stents), Afib (on Eliquis), stage 3 CKD, hx of MGUS; a/w acute hypoxic and hypercarbic respiratory failure due to COVID-19 PNA, and acute on chronic diastolic CHF.    Acute hypoxic and hypercarbic respiratory failure due to COVID19 PNA and acute CHF:  -blood gas improved significantly   -completed remdesivir 5-day course  -continue dexamethasone 6mg daily - plan for 10-day course (day 9/10)  -has been on NC - now tolerated RA at rest currently - check with ambulation  -airborne/contact precautions  -trending CBC diff, CMP, and  inflammatory markers  -Pulm/ID following ([facility]), recs appreciated  -GI ppx with protonix while on decadron    Acute on chronic diastolic CHF:  - has been on lasix 40mg IV BID; strict I/Os; monitoring hemodynamics/renal function w/diuresis  - transitioned to lasix 40mg PO BID   - monitor O2, daily weights   - cardio ([facility]), recs appreciated    Hypertensive urgency:  - continue coreg, clonidine, norvasc, hydralazine, lasix  - BP control improving after increasing clonidine to 0.3mg po BID and increasing coreg to 12.5mg po BID   - cardio ([facility]), recs appreciated  - monitor BP    Acute epistaxis:  - likely was mucosal bleed from drying O2 and bled longer due to Eliquis, ASA, and elevated BUN  - bleeding controlled with Afrin/lidocaine soaked cotton with pressure for 5 minutes per ENT recs  - ENT ([facility]), recs appreciated  - c/w humidified O2 and Ocean spray as needed    ARIELLA on CKD 3:  - baseline Cr appears to be ~2.0 - renally dose meds, no nephrotoxics  - Cr up to 2.6, but now improved while on lasix and stable ~2.2  - nephro ([facility]), recs appreciated    Anemia:  - pt with hx of MGUS and CKD likely contributing to anemia, along with current acute illness  - Hgb had trended down to 7.5, now improving and >8  - no gross hematuria, hematochezia, melena; monitor for signs of blood loss  - discussed blood transfusion with the pt who is hesitant as he is feeling overall better  - Retacrit per nephro orders for the renal failure component of anemia   - found to be iron deficient, so given venofer 100mg IV q24h x3 doses  - goal Hgb > 8    Afib:  - c/w Eliquis  - c/w coreg  - cardio ([facility]), recs appreciated    Type 2 DM:  - goal glucose 100-180  - monitor FSG lazara as pt on decadron   - c/w HISS    psych - continue abilify, lamictal, effexor    DVT prophy   - c/w Eliquis    Disp: per KIERA, pt's group home cannot accept him if he is COVID positive

## 2022-01-12 NOTE — ED ADULT NURSE NOTE - NS TRANSFER PATIENT BELONGINGS
Acitretin Pregnancy And Lactation Text: This medication is Pregnancy Category X and should not be given to women who are pregnant or may become pregnant in the future. This medication is excreted in breast milk. Erythromycin Pregnancy And Lactation Text: This medication is Pregnancy Category B and is considered safe during pregnancy. It is also excreted in breast milk. Cyclophosphamide Counseling:  I discussed with the patient the risks of cyclophosphamide including but not limited to hair loss, hormonal abnormalities, decreased fertility, abdominal pain, diarrhea, nausea and vomiting, bone marrow suppression and infection. The patient understands that monitoring is required while taking this medication. Otezla Counseling: The side effects of Otezla were discussed with the patient, including but not limited to worsening or new depression, weight loss, diarrhea, nausea, upper respiratory tract infection, and headache. Patient instructed to call the office should any adverse effect occur.  The patient verbalized understanding of the proper use and possible adverse effects of Otezla.  All the patient's questions and concerns were addressed. Albendazole Counseling:  I discussed with the patient the risks of albendazole including but not limited to cytopenia, kidney damage, nausea/vomiting and severe allergy.  The patient understands that this medication is being used in an off-label manner. Zyclara Pregnancy And Lactation Text: This medication is Pregnancy Category C. It is unknown if this medication is excreted in breast milk. Siliq Counseling:  I discussed with the patient the risks of Siliq including but not limited to new or worsening depression, suicidal thoughts and behavior, immunosuppression, malignancy, posterior leukoencephalopathy syndrome, and serious infections.  The patient understands that monitoring is required including a PPD at baseline and must alert us or the primary physician if symptoms of infection or other concerning signs are noted. There is also a special program designed to monitor depression which is required with Siliq. Valtrex Pregnancy And Lactation Text: this medication is Pregnancy Category B and is considered safe during pregnancy. This medication is not directly found in breast milk but it's metabolite acyclovir is present. Humira Pregnancy And Lactation Text: This medication is Pregnancy Category B and is considered safe during pregnancy. It is unknown if this medication is excreted in breast milk. Include Pregnancy/Lactation Warning?: No Minocycline Counseling: Patient advised regarding possible photosensitivity and discoloration of the teeth, skin, lips, tongue and gums.  Patient instructed to avoid sunlight, if possible.  When exposed to sunlight, patients should wear protective clothing, sunglasses, and sunscreen.  The patient was instructed to call the office immediately if the following severe adverse effects occur:  hearing changes, easy bruising/bleeding, severe headache, or vision changes.  The patient verbalized understanding of the proper use and possible adverse effects of minocycline.  All of the patient's questions and concerns were addressed. Topical Sulfur Applications Counseling: Topical Sulfur Counseling: Patient counseled that this medication may cause skin irritation or allergic reactions.  In the event of skin irritation, the patient was advised to reduce the amount of the drug applied or use it less frequently.   The patient verbalized understanding of the proper use and possible adverse effects of topical sulfur application.  All of the patient's questions and concerns were addressed. Griseofulvin Pregnancy And Lactation Text: This medication is Pregnancy Category X and is known to cause serious birth defects. It is unknown if this medication is excreted in breast milk but breast feeding should be avoided. Rifampin Pregnancy And Lactation Text: This medication is Pregnancy Category C and it isn't know if it is safe during pregnancy. It is also excreted in breast milk and should not be used if you are breast feeding. High Dose Vitamin A Pregnancy And Lactation Text: High dose vitamin A therapy is contraindicated during pregnancy and breast feeding. Niacinamide Pregnancy And Lactation Text: These medications are considered safe during pregnancy. Infliximab Counseling:  I discussed with the patient the risks of infliximab including but not limited to myelosuppression, immunosuppression, autoimmune hepatitis, demyelinating diseases, lymphoma, and serious infections.  The patient understands that monitoring is required including a PPD at baseline and must alert us or the primary physician if symptoms of infection or other concerning signs are noted. Birth Control Pills Counseling: Birth Control Pill Counseling: I discussed with the patient the potential side effects of OCPs including but not limited to increased risk of stroke, heart attack, thrombophlebitis, deep venous thrombosis, hepatic adenomas, breast changes, GI upset, headaches, and depression.  The patient verbalized understanding of the proper use and possible adverse effects of OCPs. All of the patient's questions and concerns were addressed. Cosentyx Counseling:  I discussed with the patient the risks of Cosentyx including but not limited to worsening of Crohn's disease, immunosuppression, allergic reactions and infections.  The patient understands that monitoring is required including a PPD at baseline and must alert us or the primary physician if symptoms of infection or other concerning signs are noted. Stelara Counseling:  I discussed with the patient the risks of ustekinumab including but not limited to immunosuppression, malignancy, posterior leukoencephalopathy syndrome, and serious infections.  The patient understands that monitoring is required including a PPD at baseline and must alert us or the primary physician if symptoms of infection or other concerning signs are noted. Dupixent Pregnancy And Lactation Text: This medication likely crosses the placenta but the risk for the fetus is uncertain. This medication is excreted in breast milk. Valtrex Counseling: I discussed with the patient the risks of valacyclovir including but not limited to kidney damage, nausea, vomiting and severe allergy.  The patient understands that if the infection seems to be worsening or is not improving, they are to call. Metronidazole Pregnancy And Lactation Text: This medication is Pregnancy Category B and considered safe during pregnancy.  It is also excreted in breast milk. Fluconazole Counseling:  Patient counseled regarding adverse effects of fluconazole including but not limited to headache, diarrhea, nausea, upset stomach, liver function test abnormalities, taste disturbance, and stomach pain.  There is a rare possibility of liver failure that can occur when taking fluconazole.  The patient understands that monitoring of LFTs and kidney function test may be required, especially at baseline. The patient verbalized understanding of the proper use and possible adverse effects of fluconazole.  All of the patient's questions and concerns were addressed. Hydroxychloroquine Pregnancy And Lactation Text: This medication has been shown to cause fetal harm but it isn't assigned a Pregnancy Risk Category. There are small amounts excreted in breast milk. Tetracycline Counseling: Patient counseled regarding possible photosensitivity and increased risk for sunburn.  Patient instructed to avoid sunlight, if possible.  When exposed to sunlight, patients should wear protective clothing, sunglasses, and sunscreen.  The patient was instructed to call the office immediately if the following severe adverse effects occur:  hearing changes, easy bruising/bleeding, severe headache, or vision changes.  The patient verbalized understanding of the proper use and possible adverse effects of tetracycline.  All of the patient's questions and concerns were addressed. Patient understands to avoid pregnancy while on therapy due to potential birth defects. 5-Fu Pregnancy And Lactation Text: This medication is Pregnancy Category X and contraindicated in pregnancy and in women who may become pregnant. It is unknown if this medication is excreted in breast milk. Albendazole Pregnancy And Lactation Text: This medication is Pregnancy Category C and it isn't known if it is safe during pregnancy. It is also excreted in breast milk. Topical Retinoid counseling:  Patient advised to apply a pea-sized amount only at bedtime and wait 30 minutes after washing their face before applying.  If too drying, patient may add a non-comedogenic moisturizer. The patient verbalized understanding of the proper use and possible adverse effects of retinoids.  All of the patient's questions and concerns were addressed. Colchicine Counseling:  Patient counseled regarding adverse effects including but not limited to stomach upset (nausea, vomiting, stomach pain, or diarrhea).  Patient instructed to limit alcohol consumption while taking this medication.  Colchicine may reduce blood counts especially with prolonged use.  The patient understands that monitoring of kidney function and blood counts may be required, especially at baseline. The patient verbalized understanding of the proper use and possible adverse effects of colchicine.  All of the patient's questions and concerns were addressed. Siliq Pregnancy And Lactation Text: The risk during pregnancy and breastfeeding is uncertain with this medication. Cyclosporine Counseling:  I discussed with the patient the risks of cyclosporine including but not limited to hypertension, gingival hyperplasia,myelosuppression, immunosuppression, liver damage, kidney damage, neurotoxicity, lymphoma, and serious infections. The patient understands that monitoring is required including baseline blood pressure, CBC, CMP, lipid panel and uric acid, and then 1-2 times monthly CMP and blood pressure. Dapsone Counseling: I discussed with the patient the risks of dapsone including but not limited to hemolytic anemia, agranulocytosis, rashes, methemoglobinemia, kidney failure, peripheral neuropathy, headaches, GI upset, and liver toxicity.  Patients who start dapsone require monitoring including baseline LFTs and weekly CBCs for the first month, then every month thereafter.  The patient verbalized understanding of the proper use and possible adverse effects of dapsone.  All of the patient's questions and concerns were addressed. Tetracycline Pregnancy And Lactation Text: This medication is Pregnancy Category D and not consider safe during pregnancy. It is also excreted in breast milk. Acitretin Counseling:  I discussed with the patient the risks of acitretin including but not limited to hair loss, dry lips/skin/eyes, liver damage, hyperlipidemia, depression/suicidal ideation, photosensitivity.  Serious rare side effects can include but are not limited to pancreatitis, pseudotumor cerebri, bony changes, clot formation/stroke/heart attack.  Patient understands that alcohol is contraindicated since it can result in liver toxicity and significantly prolong the elimination of the drug by many years. Clothing Glycopyrrolate Counseling:  I discussed with the patient the risks of glycopyrrolate including but not limited to skin rash, drowsiness, dry mouth, difficulty urinating, and blurred vision. Dupixent Counseling: I discussed with the patient the risks of dupilumab including but not limited to eye infection and irritation, cold sores, injection site reactions, worsening of asthma, allergic reactions and increased risk of parasitic infection.  Live vaccines should be avoided while taking dupilumab. Dupilumab will also interact with certain medications such as warfarin and cyclosporine. The patient understands that monitoring is required and they must alert us or the primary physician if symptoms of infection or other concerning signs are noted. Bactrim Pregnancy And Lactation Text: This medication is Pregnancy Category D and is known to cause fetal risk.  It is also excreted in breast milk. Hydroquinone Pregnancy And Lactation Text: This medication has not been assigned a Pregnancy Risk Category but animal studies failed to show danger with the topical medication. It is unknown if the medication is excreted in breast milk. Ilumya Counseling: I discussed with the patient the risks of tildrakizumab including but not limited to immunosuppression, malignancy, posterior leukoencephalopathy syndrome, and serious infections.  The patient understands that monitoring is required including a PPD at baseline and must alert us or the primary physician if symptoms of infection or other concerning signs are noted. Cimetidine Pregnancy And Lactation Text: This medication is Pregnancy Category B and is considered safe during pregnancy. It is also excreted in breast milk and breast feeding isn't recommended. Eucrisa Counseling: Patient may experience a mild burning sensation during topical application. Eucrisa is not approved in children less than 2 years of age. Rhofade Pregnancy And Lactation Text: This medication has not been assigned a Pregnancy Risk Category. It is unknown if the medication is excreted in breast milk. Skyrizi Counseling: I discussed with the patient the risks of risankizumab-rzaa including but not limited to immunosuppression, and serious infections.  The patient understands that monitoring is required including a PPD at baseline and must alert us or the primary physician if symptoms of infection or other concerning signs are noted. Erivedge Pregnancy And Lactation Text: This medication is Pregnancy Category X and is absolutely contraindicated during pregnancy. It is unknown if it is excreted in breast milk. Cyclophosphamide Pregnancy And Lactation Text: This medication is Pregnancy Category D and it isn't considered safe during pregnancy. This medication is excreted in breast milk. Tranexamic Acid Pregnancy And Lactation Text: It is unknown if this medication is safe during pregnancy or breast feeding. Cimzia Counseling:  I discussed with the patient the risks of Cimzia including but not limited to immunosuppression, allergic reactions and infections.  The patient understands that monitoring is required including a PPD at baseline and must alert us or the primary physician if symptoms of infection or other concerning signs are noted. Methotrexate Counseling:  Patient counseled regarding adverse effects of methotrexate including but not limited to nausea, vomiting, abnormalities in liver function tests. Patients may develop mouth sores, rash, diarrhea, and abnormalities in blood counts. The patient understands that monitoring is required including LFT's and blood counts.  There is a rare possibility of scarring of the liver and lung problems that can occur when taking methotrexate. Persistent nausea, loss of appetite, pale stools, dark urine, cough, and shortness of breath should be reported immediately. Patient advised to discontinue methotrexate treatment at least three months before attempting to become pregnant.  I discussed the need for folate supplements while taking methotrexate.  These supplements can decrease side effects during methotrexate treatment. The patient verbalized understanding of the proper use and possible adverse effects of methotrexate.  All of the patient's questions and concerns were addressed. Cyclosporine Pregnancy And Lactation Text: This medication is Pregnancy Category C and it isn't know if it is safe during pregnancy. This medication is excreted in breast milk. Clindamycin Pregnancy And Lactation Text: This medication can be used in pregnancy if certain situations. Clindamycin is also present in breast milk. Gabapentin Counseling: I discussed with the patient the risks of gabapentin including but not limited to dizziness, somnolence, fatigue and ataxia. Glycopyrrolate Pregnancy And Lactation Text: This medication is Pregnancy Category B and is considered safe during pregnancy. It is unknown if it is excreted breast milk. Methotrexate Pregnancy And Lactation Text: This medication is Pregnancy Category X and is known to cause fetal harm. This medication is excreted in breast milk. Xeljanz Counseling: I discussed with the patient the risks of Xeljanz therapy including increased risk of infection, liver issues, headache, diarrhea, or cold symptoms. Live vaccines should be avoided. They were instructed to call if they have any problems. Rifampin Counseling: I discussed with the patient the risks of rifampin including but not limited to liver damage, kidney damage, red-orange body fluids, nausea/vomiting and severe allergy. Quinolones Counseling:  I discussed with the patient the risks of fluoroquinolones including but not limited to GI upset, allergic reaction, drug rash, diarrhea, dizziness, photosensitivity, yeast infections, liver function test abnormalities, tendonitis/tendon rupture. Cellcept Counseling:  I discussed with the patient the risks of mycophenolate mofetil including but not limited to infection/immunosuppression, GI upset, hypokalemia, hypercholesterolemia, bone marrow suppression, lymphoproliferative disorders, malignancy, GI ulceration/bleed/perforation, colitis, interstitial lung disease, kidney failure, progressive multifocal leukoencephalopathy, and birth defects.  The patient understands that monitoring is required including a baseline creatinine and regular CBC testing. In addition, patient must alert us immediately if symptoms of infection or other concerning signs are noted. Benzoyl Peroxide Pregnancy And Lactation Text: This medication is Pregnancy Category C. It is unknown if benzoyl peroxide is excreted in breast milk. Elidel Counseling: Patient may experience a mild burning sensation during topical application. Elidel is not approved in children less than 2 years of age. There have been case reports of hematologic and skin malignancies in patients using topical calcineurin inhibitors although causality is questionable. Clofazimine Pregnancy And Lactation Text: This medication is Pregnancy Category C and isn't considered safe during pregnancy. It is excreted in breast milk. Bexarotene Counseling:  I discussed with the patient the risks of bexarotene including but not limited to hair loss, dry lips/skin/eyes, liver abnormalities, hyperlipidemia, pancreatitis, depression/suicidal ideation, photosensitivity, drug rash/allergic reactions, hypothyroidism, anemia, leukopenia, infection, cataracts, and teratogenicity.  Patient understands that they will need regular blood tests to check lipid profile, liver function tests, white blood cell count, thyroid function tests and pregnancy test if applicable. Odomzo Counseling- I discussed with the patient the risks of Odomzo including but not limited to nausea, vomiting, diarrhea, constipation, weight loss, changes in the sense of taste, decreased appetite, muscle spasms, and hair loss.  The patient verbalized understanding of the proper use and possible adverse effects of Odomzo.  All of the patient's questions and concerns were addressed. Cephalexin Pregnancy And Lactation Text: This medication is Pregnancy Category B and considered safe during pregnancy.  It is also excreted in breast milk but can be used safely for shorter doses. Bexarotene Pregnancy And Lactation Text: This medication is Pregnancy Category X and should not be given to women who are pregnant or may become pregnant. This medication should not be used if you are breast feeding. Ketoconazole Counseling:   Patient counseled regarding improving absorption with orange juice.  Adverse effects include but are not limited to breast enlargement, headache, diarrhea, nausea, upset stomach, liver function test abnormalities, taste disturbance, and stomach pain.  There is a rare possibility of liver failure that can occur when taking ketoconazole. The patient understands that monitoring of LFTs may be required, especially at baseline. The patient verbalized understanding of the proper use and possible adverse effects of ketoconazole.  All of the patient's questions and concerns were addressed. Protopic Counseling: Patient may experience a mild burning sensation during topical application. Protopic is not approved in children less than 2 years of age. There have been case reports of hematologic and skin malignancies in patients using topical calcineurin inhibitors although causality is questionable. Birth Control Pills Pregnancy And Lactation Text: This medication should be avoided if pregnant and for the first 30 days post-partum. Fluconazole Pregnancy And Lactation Text: This medication is Pregnancy Category C and it isn't know if it is safe during pregnancy. It is also excreted in breast milk. Cellcept Pregnancy And Lactation Text: This medication is Pregnancy Category D and isn't considered safe during pregnancy. It is unknown if this medication is excreted in breast milk. Azathioprine Counseling:  I discussed with the patient the risks of azathioprine including but not limited to myelosuppression, immunosuppression, hepatotoxicity, lymphoma, and infections.  The patient understands that monitoring is required including baseline LFTs, Creatinine, possible TPMP genotyping and weekly CBCs for the first month and then every 2 weeks thereafter.  The patient verbalized understanding of the proper use and possible adverse effects of azathioprine.  All of the patient's questions and concerns were addressed. Picato Counseling:  I discussed with the patient the risks of Picato including but not limited to erythema, scaling, itching, weeping, crusting, and pain. Rhofade Counseling: Rhofade is a topical medication which can decrease superficial blood flow where applied. Side effects are uncommon and include stinging, redness and allergic reactions. SSKI Counseling:  I discussed with the patient the risks of SSKI including but not limited to thyroid abnormalities, metallic taste, GI upset, fever, headache, acne, arthralgias, paraesthesias, lymphadenopathy, easy bleeding, arrhythmias, and allergic reaction. High Dose Vitamin A Counseling: Side effects reviewed, pt to contact office should one occur. Drysol Pregnancy And Lactation Text: This medication is considered safe during pregnancy and breast feeding. Niacinamide Counseling: I recommended taking niacin or niacinamide, also know as vitamin B3, twice daily. Recent evidence suggests that taking vitamin B3 (500 mg twice daily) can reduce the risk of actinic keratoses and non-melanoma skin cancers. Side effects of vitamin B3 include flushing and headache. Opioid Counseling: I discussed with the patient the potential side effects of opioids including but not limited to addiction, altered mental status, and depression. I stressed avoiding alcohol, benzodiazepines, muscle relaxants and sleep aids unless specifically okayed by a physician. The patient verbalized understanding of the proper use and possible adverse effects of opioids. All of the patient's questions and concerns were addressed. They were instructed to flush the remaining pills down the toilet if they did not need them for pain. Solaraze Pregnancy And Lactation Text: This medication is Pregnancy Category B and is considered safe. There is some data to suggest avoiding during the third trimester. It is unknown if this medication is excreted in breast milk. Imiquimod Counseling:  I discussed with the patient the risks of imiquimod including but not limited to erythema, scaling, itching, weeping, crusting, and pain.  Patient understands that the inflammatory response to imiquimod is variable from person to person and was educated regarded proper titration schedule.  If flu-like symptoms develop, patient knows to discontinue the medication and contact us. Hydroxychloroquine Counseling:  I discussed with the patient that a baseline ophthalmologic exam is needed at the start of therapy and every year thereafter while on therapy. A CBC may also be warranted for monitoring.  The side effects of this medication were discussed with the patient, including but not limited to agranulocytosis, aplastic anemia, seizures, rashes, retinopathy, and liver toxicity. Patient instructed to call the office should any adverse effect occur.  The patient verbalized understanding of the proper use and possible adverse effects of Plaquenil.  All the patient's questions and concerns were addressed. Finasteride Male Counseling: Finasteride Counseling:  I discussed with the patient the risks of use of finasteride including but not limited to decreased libido, decreased ejaculate volume, gynecomastia, and depression. Women should not handle medication.  All of the patient's questions and concerns were addressed. Spironolactone Counseling: Patient advised regarding risks of diarrhea, abdominal pain, hyperkalemia, birth defects (for female patients), liver toxicity and renal toxicity. The patient may need blood work to monitor liver and kidney function and potassium levels while on therapy. The patient verbalized understanding of the proper use and possible adverse effects of spironolactone.  All of the patient's questions and concerns were addressed. Carac Counseling:  I discussed with the patient the risks of Carac including but not limited to erythema, scaling, itching, weeping, crusting, and pain. 5-Fu Counseling: 5-Fluorouracil Counseling:  I discussed with the patient the risks of 5-fluorouracil including but not limited to erythema, scaling, itching, weeping, crusting, and pain. Cimetidine Counseling:  I discussed with the patient the risks of Cimetidine including but not limited to gynecomastia, headache, diarrhea, nausea, drowsiness, arrhythmias, pancreatitis, skin rashes, psychosis, bone marrow suppression and kidney toxicity. Taltz Counseling: I discussed with the patient the risks of ixekizumab including but not limited to immunosuppression, serious infections, worsening of inflammatory bowel disease and drug reactions.  The patient understands that monitoring is required including a PPD at baseline and must alert us or the primary physician if symptoms of infection or other concerning signs are noted. Ivermectin Counseling:  Patient instructed to take medication on an empty stomach with a full glass of water.  Patient informed of potential adverse effects including but not limited to nausea, diarrhea, dizziness, itching, and swelling of the extremities or lymph nodes.  The patient verbalized understanding of the proper use and possible adverse effects of ivermectin.  All of the patient's questions and concerns were addressed. Erythromycin Counseling:  I discussed with the patient the risks of erythromycin including but not limited to GI upset, allergic reaction, drug rash, diarrhea, increase in liver enzymes, and yeast infections. Otezla Pregnancy And Lactation Text: This medication is Pregnancy Category C and it isn't known if it is safe during pregnancy. It is unknown if it is excreted in breast milk. Rituxan Counseling:  I discussed with the patient the risks of Rituxan infusions. Side effects can include infusion reactions, severe drug rashes including mucocutaneous reactions, reactivation of latent hepatitis and other infections and rarely progressive multifocal leukoencephalopathy.  All of the patient's questions and concerns were addressed. Doxepin Counseling:  Patient advised that the medication is sedating and not to drive a car after taking this medication. Patient informed of potential adverse effects including but not limited to dry mouth, urinary retention, and blurry vision.  The patient verbalized understanding of the proper use and possible adverse effects of doxepin.  All of the patient's questions and concerns were addressed. Cimzia Pregnancy And Lactation Text: This medication crosses the placenta but can be considered safe in certain situations. Cimzia may be excreted in breast milk. Hydroxyzine Pregnancy And Lactation Text: This medication is not safe during pregnancy and should not be taken. It is also excreted in breast milk and breast feeding isn't recommended. Libtayo Pregnancy And Lactation Text: This medication is contraindicated in pregnancy and when breast feeding. Wartpeel Counseling:  I discussed with the patient the risks of Wartpeel including but not limited to erythema, scaling, itching, weeping, crusting, and pain. Oxybutynin Pregnancy And Lactation Text: This medication is Pregnancy Category B and is considered safe during pregnancy. It is unknown if it is excreted in breast milk. Isotretinoin Pregnancy And Lactation Text: This medication is Pregnancy Category X and is considered extremely dangerous during pregnancy. It is unknown if it is excreted in breast milk. Doxycycline Counseling:  Patient counseled regarding possible photosensitivity and increased risk for sunburn.  Patient instructed to avoid sunlight, if possible.  When exposed to sunlight, patients should wear protective clothing, sunglasses, and sunscreen.  The patient was instructed to call the office immediately if the following severe adverse effects occur:  hearing changes, easy bruising/bleeding, severe headache, or vision changes.  The patient verbalized understanding of the proper use and possible adverse effects of doxycycline.  All of the patient's questions and concerns were addressed. Humira Counseling:  I discussed with the patient the risks of adalimumab including but not limited to myelosuppression, immunosuppression, autoimmune hepatitis, demyelinating diseases, lymphoma, and serious infections.  The patient understands that monitoring is required including a PPD at baseline and must alert us or the primary physician if symptoms of infection or other concerning signs are noted. Enbrel Counseling:  I discussed with the patient the risks of etanercept including but not limited to myelosuppression, immunosuppression, autoimmune hepatitis, demyelinating diseases, lymphoma, and infections.  The patient understands that monitoring is required including a PPD at baseline and must alert us or the primary physician if symptoms of infection or other concerning signs are noted. Azithromycin Pregnancy And Lactation Text: This medication is considered safe during pregnancy and is also secreted in breast milk. Dapsone Pregnancy And Lactation Text: This medication is Pregnancy Category C and is not considered safe during pregnancy or breast feeding. Prednisone Counseling:  I discussed with the patient the risks of prolonged use of prednisone including but not limited to weight gain, insomnia, osteoporosis, mood changes, diabetes, susceptibility to infection, glaucoma and high blood pressure.  In cases where prednisone use is prolonged, patients should be monitored with blood pressure checks, serum glucose levels and an eye exam.  Additionally, the patient may need to be placed on GI prophylaxis, PCP prophylaxis, and calcium and vitamin D supplementation and/or a bisphosphonate.  The patient verbalized understanding of the proper use and the possible adverse effects of prednisone.  All of the patient's questions and concerns were addressed. Bactrim Counseling:  I discussed with the patient the risks of sulfa antibiotics including but not limited to GI upset, allergic reaction, drug rash, diarrhea, dizziness, photosensitivity, and yeast infections.  Rarely, more serious reactions can occur including but not limited to aplastic anemia, agranulocytosis, methemoglobinemia, blood dyscrasias, liver or kidney failure, lung infiltrates or desquamative/blistering drug rashes. Metronidazole Counseling:  I discussed with the patient the risks of metronidazole including but not limited to seizures, nausea/vomiting, a metallic taste in the mouth, nausea/vomiting and severe allergy. Hydroquinone Counseling:  Patient advised that medication may result in skin irritation, lightening (hypopigmentation), dryness, and burning.  In the event of skin irritation, the patient was advised to reduce the amount of the drug applied or use it less frequently.  Rarely, spots that are treated with hydroquinone can become darker (pseudoochronosis).  Should this occur, patient instructed to stop medication and call the office. The patient verbalized understanding of the proper use and possible adverse effects of hydroquinone.  All of the patient's questions and concerns were addressed. Erivedge Counseling- I discussed with the patient the risks of Erivedge including but not limited to nausea, vomiting, diarrhea, constipation, weight loss, changes in the sense of taste, decreased appetite, muscle spasms, and hair loss.  The patient verbalized understanding of the proper use and possible adverse effects of Erivedge.  All of the patient's questions and concerns were addressed. Arava Counseling:  Patient counseled regarding adverse effects of Arava including but not limited to nausea, vomiting, abnormalities in liver function tests. Patients may develop mouth sores, rash, diarrhea, and abnormalities in blood counts. The patient understands that monitoring is required including LFTs and blood counts.  There is a rare possibility of scarring of the liver and lung problems that can occur when taking methotrexate. Persistent nausea, loss of appetite, pale stools, dark urine, cough, and shortness of breath should be reported immediately. Patient advised to discontinue Arava treatment and consult with a physician prior to attempting conception. The patient will have to undergo a treatment to eliminate Arava from the body prior to conception. Ketoconazole Pregnancy And Lactation Text: This medication is Pregnancy Category C and it isn't know if it is safe during pregnancy. It is also excreted in breast milk and breast feeding isn't recommended. Benzoyl Peroxide Counseling: Patient counseled that medicine may cause skin irritation and bleach clothing.  In the event of skin irritation, the patient was advised to reduce the amount of the drug applied or use it less frequently.   The patient verbalized understanding of the proper use and possible adverse effects of benzoyl peroxide.  All of the patient's questions and concerns were addressed. Mirvaso Counseling: Mirvaso is a topical medication which can decrease superficial blood flow where applied. Side effects are uncommon and include stinging, redness and allergic reactions. Sski Pregnancy And Lactation Text: This medication is Pregnancy Category D and isn't considered safe during pregnancy. It is excreted in breast milk. Rituxan Pregnancy And Lactation Text: This medication is Pregnancy Category C and it isn't know if it is safe during pregnancy. It is unknown if this medication is excreted in breast milk but similar antibodies are known to be excreted. Finasteride Pregnancy And Lactation Text: This medication is absolutely contraindicated during pregnancy. It is unknown if it is excreted in breast milk. Itraconazole Counseling:  I discussed with the patient the risks of itraconazole including but not limited to liver damage, nausea/vomiting, neuropathy, and severe allergy.  The patient understands that this medication is best absorbed when taken with acidic beverages such as non-diet cola or ginger ale.  The patient understands that monitoring is required including baseline LFTs and repeat LFTs at intervals.  The patient understands that they are to contact us or the primary physician if concerning signs are noted. Griseofulvin Counseling:  I discussed with the patient the risks of griseofulvin including but not limited to photosensitivity, cytopenia, liver damage, nausea/vomiting and severe allergy.  The patient understands that this medication is best absorbed when taken with a fatty meal (e.g., ice cream or french fries). Xolair Pregnancy And Lactation Text: This medication is Pregnancy Category B and is considered safe during pregnancy. This medication is excreted in breast milk. Azithromycin Counseling:  I discussed with the patient the risks of azithromycin including but not limited to GI upset, allergic reaction, drug rash, diarrhea, and yeast infections. Calcipotriene Counseling:  I discussed with the patient the risks of calcipotriene including but not limited to erythema, scaling, itching, and irritation. Simponi Counseling:  I discussed with the patient the risks of golimumab including but not limited to myelosuppression, immunosuppression, autoimmune hepatitis, demyelinating diseases, lymphoma, and serious infections.  The patient understands that monitoring is required including a PPD at baseline and must alert us or the primary physician if symptoms of infection or other concerning signs are noted. Libtayo Counseling- I discussed with the patient the risks of Libtayo including but not limited to nausea, vomiting, diarrhea, and bone or muscle pain.  The patient verbalized understanding of the proper use and possible adverse effects of Libtayo.  All of the patient's questions and concerns were addressed. Clofazimine Counseling:  I discussed with the patient the risks of clofazimine including but not limited to skin and eye pigmentation, liver damage, nausea/vomiting, gastrointestinal bleeding and allergy. Oxybutynin Counseling:  I discussed with the patient the risks of oxybutynin including but not limited to skin rash, drowsiness, dry mouth, difficulty urinating, and blurred vision. Protopic Pregnancy And Lactation Text: This medication is Pregnancy Category C. It is unknown if this medication is excreted in breast milk when applied topically. Sarecycline Counseling: Patient advised regarding possible photosensitivity and discoloration of the teeth, skin, lips, tongue and gums.  Patient instructed to avoid sunlight, if possible.  When exposed to sunlight, patients should wear protective clothing, sunglasses, and sunscreen.  The patient was instructed to call the office immediately if the following severe adverse effects occur:  hearing changes, easy bruising/bleeding, severe headache, or vision changes.  The patient verbalized understanding of the proper use and possible adverse effects of sarecycline.  All of the patient's questions and concerns were addressed. Doxepin Pregnancy And Lactation Text: This medication is Pregnancy Category C and it isn't known if it is safe during pregnancy. It is also excreted in breast milk and breast feeding isn't recommended. Xelcatz Pregnancy And Lactation Text: This medication is Pregnancy Category D and is not considered safe during pregnancy.  The risk during breast feeding is also uncertain. Isotretinoin Counseling: Patient should get monthly blood tests, not donate blood, not drive at night if vision affected, not share medication, and not undergo elective surgery for 6 months after tx completed. Side effects reviewed, pt to contact office should one occur. Solaraze Counseling:  I discussed with the patient the risks of Solaraze including but not limited to erythema, scaling, itching, weeping, crusting, and pain. Topical Sulfur Applications Pregnancy And Lactation Text: This medication is Pregnancy Category C and has an unknown safety profile during pregnancy. It is unknown if this topical medication is excreted in breast milk. Detail Level: Simple Nsaids Pregnancy And Lactation Text: These medications are considered safe up to 30 weeks gestation. It is excreted in breast milk. Minoxidil Counseling: Minoxidil is a topical medication which can increase blood flow where it is applied. It is uncertain how this medication increases hair growth. Side effects are uncommon and include stinging and allergic reactions. Drysol Counseling:  I discussed with the patient the risks of drysol/aluminum chloride including but not limited to skin rash, itching, irritation, burning. Zyclara Counseling:  I discussed with the patient the risks of imiquimod including but not limited to erythema, scaling, itching, weeping, crusting, and pain.  Patient understands that the inflammatory response to imiquimod is variable from person to person and was educated regarded proper titration schedule.  If flu-like symptoms develop, patient knows to discontinue the medication and contact us. Tremfya Counseling: I discussed with the patient the risks of guselkumab including but not limited to immunosuppression, serious infections, worsening of inflammatory bowel disease and drug reactions.  The patient understands that monitoring is required including a PPD at baseline and must alert us or the primary physician if symptoms of infection or other concerning signs are noted. Spironolactone Pregnancy And Lactation Text: This medication can cause feminization of the male fetus and should be avoided during pregnancy. The active metabolite is also found in breast milk. Nsaids Counseling: NSAID Counseling: I discussed with the patient that NSAIDs should be taken with food. Prolonged use of NSAIDs can result in the development of stomach ulcers.  Patient advised to stop taking NSAIDs if abdominal pain occurs.  The patient verbalized understanding of the proper use and possible adverse effects of NSAIDs.  All of the patient's questions and concerns were addressed. Opioid Pregnancy And Lactation Text: These medications can lead to premature delivery and should be avoided during pregnancy. These medications are also present in breast milk in small amounts. Doxycycline Pregnancy And Lactation Text: This medication is Pregnancy Category D and not consider safe during pregnancy. It is also excreted in breast milk but is considered safe for shorter treatment courses. Tranexamic Acid Counseling:  Patient advised of the small risk of bleeding problems with tranexamic acid. They were also instructed to call if they developed any nausea, vomiting or diarrhea. All of the patient's questions and concerns were addressed. Cephalexin Counseling: I counseled the patient regarding use of cephalexin as an antibiotic for prophylactic and/or therapeutic purposes. Cephalexin (commonly prescribed under brand name Keflex) is a cephalosporin antibiotic which is active against numerous classes of bacteria, including most skin bacteria. Side effects may include nausea, diarrhea, gastrointestinal upset, rash, hives, yeast infections, and in rare cases, hepatitis, kidney disease, seizures, fever, confusion, neurologic symptoms, and others. Patients with severe allergies to penicillin medications are cautioned that there is about a 10% incidence of cross-reactivity with cephalosporins. When possible, patients with penicillin allergies should use alternatives to cephalosporins for antibiotic therapy. Tazorac Pregnancy And Lactation Text: This medication is not safe during pregnancy. It is unknown if this medication is excreted in breast milk. Thalidomide Counseling: I discussed with the patient the risks of thalidomide including but not limited to birth defects, anxiety, weakness, chest pain, dizziness, cough and severe allergy. Calcipotriene Pregnancy And Lactation Text: This medication has not been proven safe during pregnancy. It is unknown if this medication is excreted in breast milk. Propranolol Counseling:  I discussed with the patient the risks of propranolol including but not limited to low heart rate, low blood pressure, low blood sugar, restlessness and increased cold sensitivity. They should call the office if they experience any of these side effects. Tazorac Counseling:  Patient advised that medication is irritating and drying.  Patient may need to apply sparingly and wash off after an hour before eventually leaving it on overnight.  The patient verbalized understanding of the proper use and possible adverse effects of tazorac.  All of the patient's questions and concerns were addressed. Topical Clindamycin Counseling: Patient counseled that this medication may cause skin irritation or allergic reactions.  In the event of skin irritation, the patient was advised to reduce the amount of the drug applied or use it less frequently.   The patient verbalized understanding of the proper use and possible adverse effects of clindamycin.  All of the patient's questions and concerns were addressed. Terbinafine Counseling: Patient counseling regarding adverse effects of terbinafine including but not limited to headache, diarrhea, rash, upset stomach, liver function test abnormalities, itching, taste/smell disturbance, nausea, abdominal pain, and flatulence.  There is a rare possibility of liver failure that can occur when taking terbinafine.  The patient understands that a baseline LFT and kidney function test may be required. The patient verbalized understanding of the proper use and possible adverse effects of terbinafine.  All of the patient's questions and concerns were addressed. Propranolol Pregnancy And Lactation Text: This medication is Pregnancy Category C and it isn't known if it is safe during pregnancy. It is excreted in breast milk. Clindamycin Counseling: I counseled the patient regarding use of clindamycin as an antibiotic for prophylactic and/or therapeutic purposes. Clindamycin is active against numerous classes of bacteria, including skin bacteria. Side effects may include nausea, diarrhea, gastrointestinal upset, rash, hives, yeast infections, and in rare cases, colitis. Xolair Counseling:  Patient informed of potential adverse effects including but not limited to fever, muscle aches, rash and allergic reactions.  The patient verbalized understanding of the proper use and possible adverse effects of Xolair.  All of the patient's questions and concerns were addressed. Hydroxyzine Counseling: Patient advised that the medication is sedating and not to drive a car after taking this medication.  Patient informed of potential adverse effects including but not limited to dry mouth, urinary retention, and blurry vision.  The patient verbalized understanding of the proper use and possible adverse effects of hydroxyzine.  All of the patient's questions and concerns were addressed.

## 2022-01-12 NOTE — PROGRESS NOTE ADULT - SUBJECTIVE AND OBJECTIVE BOX
[facility] -- Fifi Bliss, Kalpesh Valdovinos, Abraham Sheridan Savella  Office # [phone]      Follow Up:    CAD, elevated trop, volume overload  Subjective/Observations:   Seen at bedside, remains on RA, wants to be discharged.   denies chest pain dizziness palpitations dyspnea     REVIEW OF SYSTEMS: All other review of systems is negative unless indicated above    PAST MEDICAL & SURGICAL HISTORY:  HTN (hypertension)  c/b multiple episodes of hypertensive urgency    HLD (hyperlipidemia)    Atrial fibrillation  first documented on EKG 10/7/2021    CAD (coronary artery disease)  s/p stents (not on plavix)    Type 2 diabetes mellitus  not on home insulin/ Meds    Anxiety  Depression  multiple psych medications    History of diverticulitis  07/2021    Diverticulosis  c/b GIB in 2020    Afib  on AC    Stage 3 chronic kidney disease    Anemia of chronic disease  Monoclonal Gammopathy-MGUS  pRBC transfusion 10/15/21    Chronic diastolic congestive heart failure    Multiple thyroid nodules    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  Had surgery ( April 2013 )    S/P tonsillectomy    S/P arthroscopic knee surgery  Bilateral ( 2005 )    Torsion of testicle  Had surgery at age 14    Pilonidal cyst  Had surgery ( 1969 )    S/P cataract surgery  Bilateral    H/O hernia repair        MEDICATIONS  (STANDING):  amLODIPine   Tablet 10 milliGRAM(s) Oral daily  apixaban 5 milliGRAM(s) Oral two times a day  ARIPiprazole 30 milliGRAM(s) Oral daily  aspirin enteric coated 81 milliGRAM(s) Oral daily  atorvastatin 10 milliGRAM(s) Oral at bedtime  budesonide 160 MICROgram(s)/formoterol 4.5 MICROgram(s) Inhaler 2 Puff(s) Inhalation two times a day  carvedilol 12.5 milliGRAM(s) Oral every 12 hours  cloNIDine 0.3 milliGRAM(s) Oral two times a day  cyanocobalamin 1000 MICROGram(s) Oral daily  dexAMETHasone     Tablet 6 milliGRAM(s) Oral daily  dextrose 40% Gel 15 Gram(s) Oral once  dextrose 5%. 1000 milliLiter(s) (50 mL/Hr) IV Continuous <Continuous>  dextrose 5%. 1000 milliLiter(s) (100 mL/Hr) IV Continuous <Continuous>  dextrose 50% Injectable 25 Gram(s) IV Push once  dextrose 50% Injectable 12.5 Gram(s) IV Push once  dextrose 50% Injectable 25 Gram(s) IV Push once  famotidine    Tablet 20 milliGRAM(s) Oral daily  folic acid 1 milliGRAM(s) Oral daily  furosemide    Tablet 40 milliGRAM(s) Oral two times a day  glucagon  Injectable 1 milliGRAM(s) IntraMuscular once  hydrALAZINE 100 milliGRAM(s) Oral three times a day  insulin lispro (ADMELOG) corrective regimen sliding scale   SubCutaneous three times a day before meals  insulin lispro (ADMELOG) corrective regimen sliding scale   SubCutaneous at bedtime  isosorbide   mononitrate ER Tablet (IMDUR) 120 milliGRAM(s) Oral daily  lamoTRIgine 100 milliGRAM(s) Oral daily  mirtazapine 30 milliGRAM(s) Oral at bedtime  oxybutynin 5 milliGRAM(s) Oral two times a day  pantoprazole    Tablet 40 milliGRAM(s) Oral before breakfast  senna 2 Tablet(s) Oral at bedtime  sodium chloride 0.65% Nasal 1 Spray(s) Both Nostrils four times a day  venlafaxine XR. 150 milliGRAM(s) Oral daily    MEDICATIONS  (PRN):  acetaminophen     Tablet .. 650 milliGRAM(s) Oral every 6 hours PRN Temp greater or equal to 38.5C (101.3F), Moderate Pain (4 - 6)  ALBUTerol    90 MICROgram(s) HFA Inhaler 2 Puff(s) Inhalation every 6 hours PRN Shortness of Breath and/or Wheezing      Allergies    No Known Allergies    Intolerances        Vital Signs Last 24 Hrs  T(C): 36.4 (12 Jan 2022 05:50), Max: 36.4 (11 Jan 2022 11:11)  T(F): 97.6 (12 Jan 2022 05:50), Max: 97.6 (11 Jan 2022 11:11)  HR: 65 (12 Jan 2022 05:50) (58 - 70)  BP: 129/71 (12 Jan 2022 06:54) (129/71 - 171/86)  BP(mean): --  RR: 18 (12 Jan 2022 05:50) (18 - 19)  SpO2: 92% (12 Jan 2022 05:50) (92% - 96%)    I&O's Summary    11 Jan 2022 07:01  -  12 Jan 2022 07:00  --------------------------------------------------------  IN: 0 mL / OUT: 1000 mL / NET: -1000 mL          PHYSICAL EXAM:  TELE: not on tele   Constitutional: NAD, awake and alert obese   HEENT: Moist Mucous Membranes, Anicteric  Pulmonary: Non-labored, breath sounds are diminished   Cardiovascular: Regular, S1 and S2 nl, No murmurs, rubs, gallops or clicks  Gastrointestinal: Bowel Sounds present, soft, nontender.   Lymph: trace peripheral edema.  Skin: No visible rashes or ulcers.  Psych:  Mood & affect appropriate    LABS: All Labs Reviewed:                        9.9 11.52 )-----------( 332      ( 12 Jan 2022 07:45 )             31.8                         9.7    9.56  )-----------( 317      ( 11 Jan 2022 11:02 )             30.4                         9.6    7.70  )-----------( 300      ( 10 [place]co 2022 09:50 )             30.5     12 Jan 2022 07:45    140    |  101    |  44     ----------------------------<  140    3.4     |  29     |  2.30   11 Jan 2022 11:02    140    |  100    |  39     ----------------------------<  249    3.7     |  33     |  2.30   10 [place]co 2022 09:50    141    |  100    |  42     ----------------------------<  180    3.3     |  33     |  2.20     Ca    8.6        12 Jan 2022 07:45  Ca    8.2        11 Jan 2022 11:02  Ca    8.6        10 Checo 2022 09:50  Mg     2.5       11 Jan 2022 11:02    TPro  5.9    /  Alb  2.7    /  TBili  0.3    /  DBili  x      /  AST  11     /  ALT  21     /  AlkPhos  58     11 Jan 2022 11:02  TPro  6.5    /  Alb  3.0    /  TBili  0.4    /  DBili  x      /  AST  16     /  ALT  26     /  AlkPhos  60     10 [place]co 2022 09:50      12 Lead ECG (01.03.22 @ 10:49) >    Ventricular Rate 94 BPM    Atrial Rate 91 BPM    QRS Duration 100 ms    Q-T Interval 378 ms    QTC Calculation(Bazett) 472 ms    R Axis -15 degrees    T Axis 261 degrees    Diagnosis Line Atrial fibrillation  Incomplete right bundle branch block  Moderate voltage criteria for LVH, may be normal variant  Nonspecific T wave abnormality  Prolonged QT  Abnormal ECG  Confirmed by Victorino Valdovinos MD ([id number]) on 1/3/2022 3:17:52 PM    < end of copied text >  < from: TTE Echo Complete w/o Contrast w/ Doppler (01.06.22 @ 10:52) >   EXAM:  ECHO TTE WO CON COMP W DOPP         PROCEDURE DATE:  01/06/2022        INTERPRETATION:  INDICATION: Dyspnea  Sonographer PH    Blood Pressure 177/86    Height 172.7 cm     Weight 104.4 kg    Dimensions:  LA 4.2       Normal Values: 2.0 - 4.0 cm  Ao 3.7        Normal Values: 2.0 - 3.8 cm  SEPTUM 1.4       Normal Values: 0.6 - 1.2 cm  PWT 1.4       Normal Values: 0.6 - 1.1 cm  LVIDd 5.2         Normal Values: 3.0 - 5.6 cm  LVIDs 4.2         Normal Values: 1.8 - 4.0 cm      OBSERVATIONS:  Technically difficult study  Mitral Valve: Mitral annular calcification with thickened leaflets, mild   MR.  Aortic Valve/Aorta: Calcified trileaflet aortic valve with decreased   opening. Peak transaortic valve gradient is 29.4 mmHg with a mean   transaortic valve gradient 14.7 mmHg. This consistent with mild aortic   stenosis.  Tricuspid Valve: Mild TR.  Pulmonic Valve: Not well-visualized  Left Atrium: Enlarged  Right Atrium: Not well-visualized  Left Ventricle: Moderate left ventricular hypertrophy with normal   systolic function, estimated LVEF of 55%.  Right Ventricle: Grossly normal size and systolic function.  Pericardium: no significant pericardial effusion.  Pulmonary/RV Pressure: estimated PA systolic pressure of 32mmHg        IMPRESSION:  Technically difficult study  Moderate left ventricular hypertrophy with normal systolic function,   estimated LVEF of 55%.  Grossly normal RV size and systolic function.  Calcified trileaflet aortic valve with mild aortic stenosis  Mild MR andTR.  No significant pericardial effusion.        < from: Xray Chest 1 View-PORTABLE IMMEDIATE (Xray Chest 1 View-PORTABLE IMMEDIATE .) (01.05.22 @ 08:14) >    ACC: [id number] EXAM:  XR CHEST PORTABLE IMMED 1V                        INTERPRETATION:  DATE OF STUDY: 1/5/22    PRIOR:12/24/21    CLINICAL INDICATION: New fever.    TECHNIQUE: portable chest.    FINDINGS:  There is stable cardiomegaly.  The left lung is clear.  Partial clearing of previously noted lower right lung airspace haziness.  New airspace opacity in mid-right lung just above right minor fissure.   New airspace opacity in upper right lung -these findings are concerning   for pneumonia right clinical setting.  No sizable pleural effusion. No pneumothorax.  Degenerative changes of the thoracic spine.    IMPRESSION:  Clear left lung.  New probable pneumonic infiltrates in mid and upper right lung.

## 2022-01-12 NOTE — PROGRESS NOTE ADULT - SUBJECTIVE AND OBJECTIVE BOX
Date/Time Patient Seen:  		  Referring MD:   Data Reviewed	       Patient is a 76y old  Male who presents with a chief complaint of CHF exacerbation (11 Jan 2022 10:28)      Subjective/HPI     PAST MEDICAL & SURGICAL HISTORY:  Hypertension    Diabetes mellitus    Lupus    Hepatitis C    Anxiety and depression    CAD (coronary artery disease)  s/p stents    Diverticulosis    Hyperlipidemia    HTN (hypertension)  c/b multiple episodes of hypertensive urgency    HLD (hyperlipidemia)    Atrial fibrillation  first documented on EKG 10/7/2021    CAD (coronary artery disease)  s/p stents (not on plavix)    Type 2 diabetes mellitus  not on home insulin/ Meds    Anxiety  Depression  multiple psych medications    History of diverticulitis  07/2021    Diverticulosis  c/b GIB in 2020    Afib  on AC    Stage 3 chronic kidney disease    Anemia of chronic disease  Monoclonal Gammopathy-MGUS  pRBC transfusion 10/15/21    Chronic diastolic congestive heart failure    Multiple thyroid nodules    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  Had surgery ( April 2013 )    S/P tonsillectomy    S/P arthroscopic knee surgery  Bilateral ( 2005 )    Torsion of testicle  Had surgery at age 14    Pilonidal cyst  Had surgery ( 1969 )    S/P cataract surgery  Bilateral    H/O hernia repair          Medication list         MEDICATIONS  (STANDING):  amLODIPine   Tablet 10 milliGRAM(s) Oral daily  apixaban 5 milliGRAM(s) Oral two times a day  ARIPiprazole 30 milliGRAM(s) Oral daily  aspirin enteric coated 81 milliGRAM(s) Oral daily  atorvastatin 10 milliGRAM(s) Oral at bedtime  budesonide 160 MICROgram(s)/formoterol 4.5 MICROgram(s) Inhaler 2 Puff(s) Inhalation two times a day  carvedilol 12.5 milliGRAM(s) Oral every 12 hours  cloNIDine 0.3 milliGRAM(s) Oral two times a day  cyanocobalamin 1000 MICROGram(s) Oral daily  dexAMETHasone     Tablet 6 milliGRAM(s) Oral daily  dextrose 40% Gel 15 Gram(s) Oral once  dextrose 5%. 1000 milliLiter(s) (100 mL/Hr) IV Continuous <Continuous>  dextrose 5%. 1000 milliLiter(s) (50 mL/Hr) IV Continuous <Continuous>  dextrose 50% Injectable 25 Gram(s) IV Push once  dextrose 50% Injectable 25 Gram(s) IV Push once  dextrose 50% Injectable 12.5 Gram(s) IV Push once  famotidine    Tablet 20 milliGRAM(s) Oral daily  folic acid 1 milliGRAM(s) Oral daily  furosemide    Tablet 40 milliGRAM(s) Oral two times a day  glucagon  Injectable 1 milliGRAM(s) IntraMuscular once  hydrALAZINE 100 milliGRAM(s) Oral three times a day  insulin lispro (ADMELOG) corrective regimen sliding scale   SubCutaneous at bedtime  insulin lispro (ADMELOG) corrective regimen sliding scale   SubCutaneous three times a day before meals  isosorbide   mononitrate ER Tablet (IMDUR) 120 milliGRAM(s) Oral daily  lamoTRIgine 100 milliGRAM(s) Oral daily  mirtazapine 30 milliGRAM(s) Oral at bedtime  oxybutynin 5 milliGRAM(s) Oral two times a day  pantoprazole    Tablet 40 milliGRAM(s) Oral before breakfast  senna 2 Tablet(s) Oral at bedtime  sodium chloride 0.65% Nasal 1 Spray(s) Both Nostrils four times a day  venlafaxine XR. 150 milliGRAM(s) Oral daily    MEDICATIONS  (PRN):  acetaminophen     Tablet .. 650 milliGRAM(s) Oral every 6 hours PRN Temp greater or equal to 38.5C (101.3F), Moderate Pain (4 - 6)  ALBUTerol    90 MICROgram(s) HFA Inhaler 2 Puff(s) Inhalation every 6 hours PRN Shortness of Breath and/or Wheezing         Vitals log        ICU Vital Signs Last 24 Hrs  T(C): 36.4 (11 Jan 2022 21:15), Max: 36.7 (11 Jan 2022 05:41)  T(F): 97.5 (11 Jan 2022 21:15), Max: 98 (11 Jan 2022 05:41)  HR: 67 (11 Jan 2022 22:47) (58 - 70)  BP: 145/90 (11 Jan 2022 21:15) (143/73 - 180/97)  BP(mean): --  ABP: --  ABP(mean): --  RR: 19 (11 Jan 2022 21:15) (17 - 19)  SpO2: 96% (11 Jan 2022 21:15) (93% - 96%)           Input and Output:  I&O's Detail    10 [place]co 2022 07:01  -  11 Jan 2022 07:00  --------------------------------------------------------  IN:  Total IN: 0 mL    OUT:    Voided (mL): 1850 mL  Total OUT: 1850 mL    Total NET: -1850 mL      11 Jan 2022 07:01  -  12 Jan 2022 05:29  --------------------------------------------------------  IN:  Total IN: 0 mL    OUT:    Voided (mL): 1000 mL  Total OUT: 1000 mL    Total NET: -1000 mL          Lab Data                        9.7    9.56  )-----------( 317      ( 11 Jan 2022 11:02 )             30.4     01-11    140  |  100  |  39<H>  ----------------------------<  249<H>  3.7   |  33<H>  |  2.30<H>    Ca    8.2<L>      11 Jan 2022 11:02  Mg     2.5     01-11    TPro  5.9<L>  /  Alb  2.7<L>  /  TBili  0.3  /  DBili  x   /  AST  11<L>  /  ALT  21  /  AlkPhos  58  01-11            Review of Systems	      Objective     Physical Examination  heart s1s2  lung dec BS  abd soft        Pertinent Lab findings & Imaging      Nikko:  NO   Adequate UO     I&O's Detail    10 [place]co 2022 07:01  -  11 Jan 2022 07:00  --------------------------------------------------------  IN:  Total IN: 0 mL    OUT:    Voided (mL): 1850 mL  Total OUT: 1850 mL    Total NET: -1850 mL      11 Jan 2022 07:01  -  12 Jan 2022 05:29  --------------------------------------------------------  IN:  Total IN: 0 mL    OUT:    Voided (mL): 1000 mL  Total OUT: 1000 mL    Total NET: -1000 mL               Discussed with:     Cultures:	        Radiology

## 2022-01-12 NOTE — PROGRESS NOTE ADULT - ASSESSMENT
75 yo M PMH of A fib (on Eliquis) , MGUS s/p PRBC transfusion 10/21, anxiety/depression, CAD s/p stents (not on plavix), CHF, Diverticulosis, HLD, HTN, thyroid nodules, CKD stage 3, DM presents to the ED with SOB    on RA  on LASIX  completed ABX  DC planning - home -   vs noted  labs reviewed    Previous echo 10/21 showed: Normal LV size with normal LV systolic function with estimated LVEF 55-60%. LV diastolic function cannot determine due to atrial fibrillation  Obesity - exam and risk factors - and features - c/w ELMER - outpatient eval  AF - on AC -   D dimer noted - serial D dimer  cvs rx regimen - diuresis - cxr and proBNP c/w Vol Overload - Cardio eval in progress - old records and TTE reviewed - I and O, serial CE -   dietary discretion  Covid - in vaccinated pt - monitor VS and Sat - Acap and Robitussin PRN - monitor VS and HD and Sat - Decadron is indicated in covid with hypoxemia  pt is on Home Rx regimen of Symbicort - unclear indication - ex smoker - no history of COPD as per pt or medical record - prior CT without Emphysema  isolation for covid -

## 2022-01-12 NOTE — CHART NOTE - NSCHARTNOTEFT_GEN_A_CORE
RN called to report complaining of anxiety. Patient admitted ARF 2/2 to covid PNA, was given ativan 0.5mg oral tablet on 1/9 and 1/10 for similar complaints and had good response. Will give patient 0.5 mg ativan oral one time for anxiety. Day team to consider placing patient on anxiety medication regimen. RN to call w/ any changes.

## 2022-01-13 LAB
ANION GAP SERPL CALC-SCNC: 7 MMOL/L — SIGNIFICANT CHANGE UP (ref 5–17)
BASOPHILS # BLD AUTO: 0.01 K/UL — SIGNIFICANT CHANGE UP (ref 0–0.2)
BASOPHILS NFR BLD AUTO: 0.1 % — SIGNIFICANT CHANGE UP (ref 0–2)
BUN SERPL-MCNC: 45 MG/DL — HIGH (ref 7–23)
CALCIUM SERPL-MCNC: 8.4 MG/DL — LOW (ref 8.5–10.1)
CHLORIDE SERPL-SCNC: 103 MMOL/L — SIGNIFICANT CHANGE UP (ref 96–108)
CO2 SERPL-SCNC: 29 MMOL/L — SIGNIFICANT CHANGE UP (ref 22–31)
CREAT SERPL-MCNC: 2.2 MG/DL — HIGH (ref 0.5–1.3)
EOSINOPHIL # BLD AUTO: 0.07 K/UL — SIGNIFICANT CHANGE UP (ref 0–0.5)
EOSINOPHIL NFR BLD AUTO: 0.7 % — SIGNIFICANT CHANGE UP (ref 0–6)
GLUCOSE SERPL-MCNC: 142 MG/DL — HIGH (ref 70–99)
HCT VFR BLD CALC: 29.4 % — LOW (ref 39–50)
HGB BLD-MCNC: 9.1 G/DL — LOW (ref 13–17)
IMM GRANULOCYTES NFR BLD AUTO: 0.7 % — SIGNIFICANT CHANGE UP (ref 0–1.5)
LYMPHOCYTES # BLD AUTO: 1.25 K/UL — SIGNIFICANT CHANGE UP (ref 1–3.3)
LYMPHOCYTES # BLD AUTO: 12.2 % — LOW (ref 13–44)
MAGNESIUM SERPL-MCNC: 2.3 MG/DL — SIGNIFICANT CHANGE UP (ref 1.6–2.6)
MCHC RBC-ENTMCNC: 27.2 PG — SIGNIFICANT CHANGE UP (ref 27–34)
MCHC RBC-ENTMCNC: 31 GM/DL — LOW (ref 32–36)
MCV RBC AUTO: 87.8 FL — SIGNIFICANT CHANGE UP (ref 80–100)
MONOCYTES # BLD AUTO: 0.59 K/UL — SIGNIFICANT CHANGE UP (ref 0–0.9)
MONOCYTES NFR BLD AUTO: 5.7 % — SIGNIFICANT CHANGE UP (ref 2–14)
NEUTROPHILS # BLD AUTO: 8.28 K/UL — HIGH (ref 1.8–7.4)
NEUTROPHILS NFR BLD AUTO: 80.6 % — HIGH (ref 43–77)
NRBC # BLD: 0 /100 WBCS — SIGNIFICANT CHANGE UP (ref 0–0)
PLATELET # BLD AUTO: 296 K/UL — SIGNIFICANT CHANGE UP (ref 150–400)
POTASSIUM SERPL-MCNC: 3.9 MMOL/L — SIGNIFICANT CHANGE UP (ref 3.5–5.3)
POTASSIUM SERPL-SCNC: 3.9 MMOL/L — SIGNIFICANT CHANGE UP (ref 3.5–5.3)
RBC # BLD: 3.35 M/UL — LOW (ref 4.2–5.8)
RBC # FLD: 18.3 % — HIGH (ref 10.3–14.5)
SODIUM SERPL-SCNC: 139 MMOL/L — SIGNIFICANT CHANGE UP (ref 135–145)
WBC # BLD: 10.27 K/UL — SIGNIFICANT CHANGE UP (ref 3.8–10.5)
WBC # FLD AUTO: 10.27 K/UL — SIGNIFICANT CHANGE UP (ref 3.8–10.5)

## 2022-01-13 PROCEDURE — 99232 SBSQ HOSP IP/OBS MODERATE 35: CPT

## 2022-01-13 PROCEDURE — 74230 X-RAY XM SWLNG FUNCJ C+: CPT | Mod: 26

## 2022-01-13 RX ADMIN — MIRTAZAPINE 30 MILLIGRAM(S): 45 TABLET, ORALLY DISINTEGRATING ORAL at 21:09

## 2022-01-13 RX ADMIN — Medication 6 MILLIGRAM(S): at 05:32

## 2022-01-13 RX ADMIN — Medication 5 MILLIGRAM(S): at 05:32

## 2022-01-13 RX ADMIN — BUDESONIDE AND FORMOTEROL FUMARATE DIHYDRATE 2 PUFF(S): 160; 4.5 AEROSOL RESPIRATORY (INHALATION) at 18:51

## 2022-01-13 RX ADMIN — PREGABALIN 1000 MICROGRAM(S): 225 CAPSULE ORAL at 12:55

## 2022-01-13 RX ADMIN — Medication 0.3 MILLIGRAM(S): at 05:32

## 2022-01-13 RX ADMIN — SENNA PLUS 2 TABLET(S): 8.6 TABLET ORAL at 21:09

## 2022-01-13 RX ADMIN — Medication 100 MILLIGRAM(S): at 21:09

## 2022-01-13 RX ADMIN — Medication 5 MILLIGRAM(S): at 16:57

## 2022-01-13 RX ADMIN — Medication 1 SPRAY(S): at 05:33

## 2022-01-13 RX ADMIN — BUDESONIDE AND FORMOTEROL FUMARATE DIHYDRATE 2 PUFF(S): 160; 4.5 AEROSOL RESPIRATORY (INHALATION) at 05:33

## 2022-01-13 RX ADMIN — Medication 40 MILLIGRAM(S): at 05:32

## 2022-01-13 RX ADMIN — Medication 1 MILLIGRAM(S): at 12:54

## 2022-01-13 RX ADMIN — ISOSORBIDE MONONITRATE 120 MILLIGRAM(S): 60 TABLET, EXTENDED RELEASE ORAL at 12:55

## 2022-01-13 RX ADMIN — APIXABAN 5 MILLIGRAM(S): 2.5 TABLET, FILM COATED ORAL at 05:32

## 2022-01-13 RX ADMIN — CARVEDILOL PHOSPHATE 12.5 MILLIGRAM(S): 80 CAPSULE, EXTENDED RELEASE ORAL at 16:56

## 2022-01-13 RX ADMIN — LAMOTRIGINE 100 MILLIGRAM(S): 25 TABLET, ORALLY DISINTEGRATING ORAL at 12:56

## 2022-01-13 RX ADMIN — Medication 1 SPRAY(S): at 16:57

## 2022-01-13 RX ADMIN — FAMOTIDINE 20 MILLIGRAM(S): 10 INJECTION INTRAVENOUS at 12:55

## 2022-01-13 RX ADMIN — Medication 2: at 16:53

## 2022-01-13 RX ADMIN — Medication 8: at 12:57

## 2022-01-13 RX ADMIN — Medication 1 SPRAY(S): at 13:00

## 2022-01-13 RX ADMIN — Medication 81 MILLIGRAM(S): at 12:54

## 2022-01-13 RX ADMIN — Medication 2: at 08:18

## 2022-01-13 RX ADMIN — Medication 1 SPRAY(S): at 21:10

## 2022-01-13 RX ADMIN — AMLODIPINE BESYLATE 10 MILLIGRAM(S): 2.5 TABLET ORAL at 05:33

## 2022-01-13 RX ADMIN — Medication 100 MILLIGRAM(S): at 05:32

## 2022-01-13 RX ADMIN — ATORVASTATIN CALCIUM 10 MILLIGRAM(S): 80 TABLET, FILM COATED ORAL at 21:09

## 2022-01-13 RX ADMIN — PANTOPRAZOLE SODIUM 40 MILLIGRAM(S): 20 TABLET, DELAYED RELEASE ORAL at 05:32

## 2022-01-13 RX ADMIN — APIXABAN 5 MILLIGRAM(S): 2.5 TABLET, FILM COATED ORAL at 16:55

## 2022-01-13 RX ADMIN — ARIPIPRAZOLE 30 MILLIGRAM(S): 15 TABLET ORAL at 12:55

## 2022-01-13 RX ADMIN — Medication 100 MILLIGRAM(S): at 13:02

## 2022-01-13 RX ADMIN — Medication 0.5 MILLIGRAM(S): at 15:04

## 2022-01-13 RX ADMIN — Medication 650 MILLIGRAM(S): at 05:47

## 2022-01-13 RX ADMIN — Medication 650 MILLIGRAM(S): at 07:00

## 2022-01-13 RX ADMIN — Medication 40 MILLIGRAM(S): at 16:56

## 2022-01-13 RX ADMIN — Medication 150 MILLIGRAM(S): at 12:54

## 2022-01-13 RX ADMIN — CARVEDILOL PHOSPHATE 12.5 MILLIGRAM(S): 80 CAPSULE, EXTENDED RELEASE ORAL at 05:32

## 2022-01-13 RX ADMIN — Medication 0.3 MILLIGRAM(S): at 16:56

## 2022-01-13 NOTE — SWALLOW VFSS/MBS ASSESSMENT ADULT - COMMENTS
Patient received in the Radiology Suite this AM for a Modified Barium Swallow Study, at which time the Patient was alert, agreeable to assessment, and denied pain. Patient familiar to this department and was seen for a bedside swallow assessment on 1/12/22, at which time a regular and thin liquid diet level and an MBSS were recommended (see report for details).    Per charting, Patient is a "75yo M from [facility], with PMH of DM2, HTN, HLD, CAD (s/p stents), Afib (on Eliquis), stage 3 CKD, hx of MGUS; a/w acute hypoxic and hypercarbic respiratory failure due to COVID-19 PNA, and acute on chronic diastolic CHF."     WBC WFL. CXR 1/5/22 revealed "Clear left lung. New probable pneumonic infiltrates in mid and upper right lung."

## 2022-01-13 NOTE — PROGRESS NOTE ADULT - SUBJECTIVE AND OBJECTIVE BOX
CHIEF COMPLAINT/INTERVAL HISTORY:  Pt. seen and evaluated for acute hypoxic respiratory failure 2/2 COVID pneumonia and acute on chronic diastolic CHF exacerbation.  Pt. is in no distress.  Denies feeling SOB.  Remains on room air with SpO2 in 90th%.      REVIEW OF SYSTEMS:  No fever, CP, SOB, or abdominal pain    Vital Signs Last 24 Hrs  T(C): 36.3 (13 Jan 2022 05:31), Max: 36.8 (12 Jan 2022 20:35)  T(F): 97.4 (13 Jan 2022 05:31), Max: 98.3 (12 Jan 2022 20:35)  HR: 70 (13 Jan 2022 05:31) (61 - 70)  BP: 169/91 (13 Jan 2022 05:31) (143/78 - 169/91)  BP(mean): --  RR: 18 (13 Jan 2022 05:31) (18 - 18)  SpO2: 94% (13 Jan 2022 05:31) (94% - 95%)    PHYSICAL EXAM:  GENERAL: NAD  HEENT: EOMI, hearing normal, conjunctiva and sclera clear  Chest: Diminished BS at bases, no wheezing  CV: S1S2, RRR,   GI: soft, +BS, NT/ND  Musculoskeletal: no edema  Psychiatric: affect nL, mood nL  Skin: warm and dry    LABS:                        9.1    10.27 )-----------( 296      ( 13 Jan 2022 06:58 )             29.4     01-13    139  |  103  |  45<H>  ----------------------------<  142<H>  3.9   |  29  |  2.20<H>    Ca    8.4<L>      13 Jan 2022 06:58  Mg     2.3     01-13      Assessment and Plan:  77yo M from group home, with PMH of DM2, HTN, HLD, CAD (s/p stents), Afib (on Eliquis), stage 3 CKD, hx of MGUS; a/w acute hypoxic and hypercarbic respiratory failure due to COVID-19 PNA, and acute on chronic diastolic CHF.    Acute hypoxic and hypercarbic respiratory failure due to COVID19 PNA and acute CHF:  -blood gas improved significantly   -completed remdesivir 5-day course  -completing course of dexamethasone 6mg daily   -has been on NC - now tolerated RA at rest currently - check with ambulation  -airborne/contact precautions  -trending CBC diff, CMP, and  inflammatory markers  -Pulm/ID following (Barb/Don), recs appreciated  -GI ppx with protonix while on decadron    Acute on chronic diastolic CHF:  - has been on lasix 40mg IV BID; strict I/Os; monitoring hemodynamics/renal function w/diuresis  - transitioned to lasix 40mg PO BID   - monitor O2, daily weights   - cardio ([facility]), recs appreciated    Hypertensive urgency:  - continue coreg, clonidine, norvasc, hydralazine, lasix  - BP control improving after increasing clonidine to 0.3mg po BID and increasing coreg to 12.5mg po BID   - cardio ([facility]), recs appreciated  - monitor BP    Acute epistaxis:  - likely was mucosal bleed from drying O2 and bled longer due to Eliquis, ASA, and elevated BUN  - bleeding controlled with Afrin/lidocaine soaked cotton with pressure for 5 minutes per ENT recs  - ENT ([facility]), recs appreciated  - c/w humidified O2 and Ocean spray as needed    ARIELLA on CKD 3:  - baseline Cr appears to be ~2.0 - renally dose meds, no nephrotoxics  - Cr up to 2.6, but now improved while on lasix and stable ~2.2  - nephro ([facility]), recs appreciated    Anemia:  - pt with hx of MGUS and CKD likely contributing to anemia, along with current acute illness  - Hgb had trended down to 7.5, now improving and >8  - no gross hematuria, hematochezia, melena; monitor for signs of blood loss  - discussed blood transfusion with the pt who is hesitant as he is feeling overall better  - Retacrit per nephro orders for the renal failure component of anemia   - found to be iron deficient, s/p venofer 100mg IV q24h x3 doses  - goal Hgb > 8    Afib:  - c/w Eliquis  - c/w coreg  - cardio ([facility]), recs appreciated    Type 2 DM:  - goal glucose 100-180  - monitor FSG lazara as pt on decadron   - c/w HISS    psych - continue abilify, lamictal, effexor    DVT prophy   - c/w Eliquis    Disp: per SW, pt's group home cannot accept him if he is COVID positive

## 2022-01-13 NOTE — SWALLOW VFSS/MBS ASSESSMENT ADULT - DEMONSTRATES NEED FOR REFERRAL TO ANOTHER SERVICE
Consider GI consult to further assess esophageal function d/t retention of barium visualized in cervical esophagus/GI Consider GI consult to further assess esophageal function d/t retention of barium visualized in cervical esophagus; consider neurology consult to further assess laryngeal function d/t SILENT penetration/aspiration suggestive of reduced laryngeal sensation./GI/neurology

## 2022-01-13 NOTE — PROGRESS NOTE ADULT - NUTRITIONAL ASSESSMENT
75 yo M PMH of A fib, MGUS , anxiety/depression, CAD s/p stents (not on plavix), CHF, Diverticulosis, HLD, HTN, thyroid nodules, CKD stage 3, DM, who presented with SOB and bilateral LE edema. Patient volume overloaded but also found to be COVID positive. Completed 5 day course of remdesivir.    He was also treated for probable CAP given pneumonic infiltrates in R lung on repeat CXR. He completed 5 days of ceftriaxone, and is now on room air. He remains afebrile and without leukocytosis.     -continue dexamethasone to complete 10 days  -AC per institution protocol  -will sign off, please call ID if any further questions. Thank you.    Cheyenne Ochoa MD  Division of Infectious Diseases   Cell [phone] between 8am and 6pm   After 6pm and weekends please call ID service at [phone]. 77 yo M PMH of A fib, MGUS , anxiety/depression, CAD s/p stents (not on plavix), CHF, Diverticulosis, HLD, HTN, thyroid nodules, CKD stage 3, DM, who presented with SOB and bilateral LE edema. Patient volume overloaded but also found to be COVID positive. Completed 5 day course of remdesivir.    He was also treated for probable CAP given pneumonic infiltrates in R lung on repeat CXR. He completed 5 days of ceftriaxone and appears improved overall. He is on room air. He remains afebrile and without leukocytosis. Urine legionella and pneumococcal antigens are negative.    -continue dexamethasone to complete 10 days  -AC per institution protocol  -will sign off, please call ID if any further questions. Thank you.    Cheyenne Ochoa MD  Division of Infectious Diseases   Cell [phone] between 8am and 6pm   After 6pm and weekends please call ID service at [phone].

## 2022-01-13 NOTE — PROGRESS NOTE ADULT - ASSESSMENT
75 yo M PMH of A fib (on Eliquis) , MGUS s/p PRBC transfusion 10/21, anxiety/depression, CAD s/p stents (not on plavix), CHF, Diverticulosis, HLD, HTN, thyroid nodules, CKD stage 3, DM presents to the ED with SOB    Swallow study noted  Covid positive - Jan 12  on RA  on LASIX  completed ABX  DC planning - home -   vs noted  labs reviewed    Previous echo 10/21 showed: Normal LV size with normal LV systolic function with estimated LVEF 55-60%. LV diastolic function cannot determine due to atrial fibrillation  Obesity - exam and risk factors - and features - c/w ELMER - outpatient eval  AF - on AC -   D dimer noted - serial D dimer  cvs rx regimen - diuresis - cxr and proBNP c/w Vol Overload - Cardio eval in progress - old records and TTE reviewed - I and O, serial CE -   dietary discretion  Covid - in vaccinated pt - monitor VS and Sat - Acap and Robitussin PRN - monitor VS and HD and Sat - Decadron is indicated in covid with hypoxemia  pt is on Home Rx regimen of Symbicort - unclear indication - ex smoker - no history of COPD as per pt or medical record - prior CT without Emphysema  isolation for covid -

## 2022-01-13 NOTE — PROGRESS NOTE ADULT - SUBJECTIVE AND OBJECTIVE BOX
Patient is a 76y old  Male who presents with a chief complaint of CHF exacerbation (05 Jan 2022 12:02)    Patient seen in follow up for CKD 4.        PAST MEDICAL HISTORY:  Hypertension    Diabetes mellitus    Lupus    Hepatitis C    Anxiety and depression    CAD (coronary artery disease)    Diverticulosis    Hyperlipidemia    HTN (hypertension)    HLD (hyperlipidemia)    Atrial fibrillation    CAD (coronary artery disease)    Type 2 diabetes mellitus    Anxiety    History of diverticulitis    Diverticulosis    Afib    Stage 3 chronic kidney disease    Anemia of chronic disease    Chronic diastolic congestive heart failure    Multiple thyroid nodules      MEDICATIONS  (STANDING):  amLODIPine   Tablet 10 milliGRAM(s) Oral daily  apixaban 5 milliGRAM(s) Oral two times a day  ARIPiprazole 30 milliGRAM(s) Oral daily  aspirin enteric coated 81 milliGRAM(s) Oral daily  atorvastatin 10 milliGRAM(s) Oral at bedtime  budesonide 160 MICROgram(s)/formoterol 4.5 MICROgram(s) Inhaler 2 Puff(s) Inhalation two times a day  carvedilol 12.5 milliGRAM(s) Oral every 12 hours  cloNIDine 0.3 milliGRAM(s) Oral two times a day  cyanocobalamin 1000 MICROGram(s) Oral daily  dexAMETHasone     Tablet 6 milliGRAM(s) Oral daily  dextrose 40% Gel 15 Gram(s) Oral once  dextrose 5%. 1000 milliLiter(s) (50 mL/Hr) IV Continuous <Continuous>  dextrose 5%. 1000 milliLiter(s) (100 mL/Hr) IV Continuous <Continuous>  dextrose 50% Injectable 25 Gram(s) IV Push once  dextrose 50% Injectable 12.5 Gram(s) IV Push once  dextrose 50% Injectable 25 Gram(s) IV Push once  famotidine    Tablet 20 milliGRAM(s) Oral daily  folic acid 1 milliGRAM(s) Oral daily  furosemide    Tablet 40 milliGRAM(s) Oral two times a day  glucagon  Injectable 1 milliGRAM(s) IntraMuscular once  hydrALAZINE 100 milliGRAM(s) Oral three times a day  insulin lispro (ADMELOG) corrective regimen sliding scale   SubCutaneous three times a day before meals  insulin lispro (ADMELOG) corrective regimen sliding scale   SubCutaneous at bedtime  isosorbide   mononitrate ER Tablet (IMDUR) 120 milliGRAM(s) Oral daily  lamoTRIgine 100 milliGRAM(s) Oral daily  mirtazapine 30 milliGRAM(s) Oral at bedtime  oxybutynin 5 milliGRAM(s) Oral two times a day  pantoprazole    Tablet 40 milliGRAM(s) Oral before breakfast  senna 2 Tablet(s) Oral at bedtime  sodium chloride 0.65% Nasal 1 Spray(s) Both Nostrils four times a day  venlafaxine XR. 150 milliGRAM(s) Oral daily    MEDICATIONS  (PRN):  acetaminophen     Tablet .. 650 milliGRAM(s) Oral every 6 hours PRN Temp greater or equal to 38.5C (101.3F), Moderate Pain (4 - 6)  ALBUTerol    90 MICROgram(s) HFA Inhaler 2 Puff(s) Inhalation every 6 hours PRN Shortness of Breath and/or Wheezing    T(C): 36.6 (01-13-22 @ 12:39), Max: 36.8 (01-12-22 @ 20:35)  HR: 67 (01-13-22 @ 12:39) (58 - 70)  BP: 136/77 (01-13-22 @ 12:39) (129/71 - 179/94)  RR: 18 (01-13-22 @ 12:39)  SpO2: 97% (01-13-22 @ 12:39)  Wt(kg): --  I&O's Detail    12 Jan 2022 07:01  -  13 Jan 2022 07:00  --------------------------------------------------------  IN:  Total IN: 0 mL    OUT:    Voided (mL): 250 mL  Total OUT: 250 mL    Total NET: -250 mL      13 Jan 2022 07:01  -  13 Jan 2022 14:09  --------------------------------------------------------  IN:    Oral Fluid: 480 mL  Total IN: 480 mL    OUT:    Voided (mL): 450 mL  Total OUT: 450 mL    Total NET: 30 mL                  PHYSICAL EXAM:  General: No distress  Respiratory: b/l air entry  Cardiovascular: S1 S2  Gastrointestinal: soft  Extremities:  + edema                             LABORATORY:                        9.1    10.27 )-----------( 296      ( 13 Jan 2022 06:58 )             29.4     01-13    139  |  103  |  45<H>  ----------------------------<  142<H>  3.9   |  29  |  2.20<H>    Ca    8.4<L>      13 Jan 2022 06:58  Mg     2.3     01-13      Sodium, Serum: 139 mmol/L (01-13 @ 06:58)  Sodium, Serum: 140 mmol/L (01-12 @ 07:45)    Potassium, Serum: 3.9 mmol/L (01-13 @ 06:58)  Potassium, Serum: 3.4 mmol/L (01-12 @ 07:45)    Hemoglobin: 9.1 g/dL (01-13 @ 06:58)  Hemoglobin: 9.9 g/dL (01-12 @ 07:45)  Hemoglobin: 9.7 g/dL (01-11 @ 11:02)    Creatinine, Serum 2.20 (01-13 @ 06:58)  Creatinine, Serum 2.30 (01-12 @ 07:45)  Creatinine, Serum 2.30 (01-11 @ 11:02)

## 2022-01-13 NOTE — SWALLOW VFSS/MBS ASSESSMENT ADULT - RECOMMENDED CONSISTENCY
1. Regular and mildly thick liquids, via single, small cup sips only!, as tolerated  2. All mildly thick liquids via single, small cup sips only.  3. Alternate solids with mildly thick liquids to assist in pharyngeal and esophageal clearance  4. Slow pacing, single/small bites  5. Maintain upright position during all meals  6. Maintain aspiration precautions  7. Maintain strict oral care  8. Swallowing therapy recommended to maximize swallow function DIAGNOSTIC IMPRESSIONS CONTINUED:  trace stasis at the BOT, vallecula, posterior pharyngeal wall, and pyriform sinuses, which reduced with subsequent swallow. Incomplete closure of the laryngeal vestibule visualized with thin liquids resulting in SILENT aspiration during the swallow without retrieval. Patient verbally cued to cough which was unsuccessful in ejecting barium from laryngeal vestibule. Compensatory strategies (i.e. chin tuck) were unsuccessful in airway protection.   5. Of note, retention of barium visualized in cervical esophagus for PO trials of pureed and regular solids, which partially reduced with liquid wash. Patient maintained airway protection throughout all PO trials, despite evidence of retention.     RECOMMENDATIONS:  1. Regular and mildly thick liquids, via single, small cup sips only!, as tolerated  2. All mildly thick liquids via single, small cup sips only.  3. Alternate solids with mildly thick liquids to assist in pharyngeal and esophageal clearance  4. Slow pacing, single/small bites  5. Maintain upright position during all meals  6. Maintain aspiration precautions  7. Maintain strict oral care  8. Swallowing therapy recommended to maximize swallow function

## 2022-01-13 NOTE — PROGRESS NOTE ADULT - SUBJECTIVE AND OBJECTIVE BOX
Date/Time Patient Seen:  		  Referring MD:   Data Reviewed	       Patient is a 76y old  Male who presents with a chief complaint of CHF exacerbation (12 Jan 2022 16:44)      Subjective/HPI     PAST MEDICAL & SURGICAL HISTORY:  Hypertension    Diabetes mellitus    Lupus    Hepatitis C    Anxiety and depression    CAD (coronary artery disease)  s/p stents    Diverticulosis    Hyperlipidemia    HTN (hypertension)  c/b multiple episodes of hypertensive urgency    HLD (hyperlipidemia)    Atrial fibrillation  first documented on EKG 10/7/2021    CAD (coronary artery disease)  s/p stents (not on plavix)    Type 2 diabetes mellitus  not on home insulin/ Meds    Anxiety  Depression  multiple psych medications    History of diverticulitis  07/2021    Diverticulosis  c/b GIB in 2020    Afib  on AC    Stage 3 chronic kidney disease    Anemia of chronic disease  Monoclonal Gammopathy-MGUS  pRBC transfusion 10/15/21    Chronic diastolic congestive heart failure    Multiple thyroid nodules    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  Had surgery ( April 2013 )    S/P tonsillectomy    S/P arthroscopic knee surgery  Bilateral ( 2005 )    Torsion of testicle  Had surgery at age 14    Pilonidal cyst  Had surgery ( 1969 )    S/P cataract surgery  Bilateral    H/O hernia repair          Medication list         MEDICATIONS  (STANDING):  amLODIPine   Tablet 10 milliGRAM(s) Oral daily  apixaban 5 milliGRAM(s) Oral two times a day  ARIPiprazole 30 milliGRAM(s) Oral daily  aspirin enteric coated 81 milliGRAM(s) Oral daily  atorvastatin 10 milliGRAM(s) Oral at bedtime  budesonide 160 MICROgram(s)/formoterol 4.5 MICROgram(s) Inhaler 2 Puff(s) Inhalation two times a day  carvedilol 12.5 milliGRAM(s) Oral every 12 hours  cloNIDine 0.3 milliGRAM(s) Oral two times a day  cyanocobalamin 1000 MICROGram(s) Oral daily  dexAMETHasone     Tablet 6 milliGRAM(s) Oral daily  dextrose 40% Gel 15 Gram(s) Oral once  dextrose 5%. 1000 milliLiter(s) (50 mL/Hr) IV Continuous <Continuous>  dextrose 5%. 1000 milliLiter(s) (100 mL/Hr) IV Continuous <Continuous>  dextrose 50% Injectable 25 Gram(s) IV Push once  dextrose 50% Injectable 12.5 Gram(s) IV Push once  dextrose 50% Injectable 25 Gram(s) IV Push once  famotidine    Tablet 20 milliGRAM(s) Oral daily  folic acid 1 milliGRAM(s) Oral daily  furosemide    Tablet 40 milliGRAM(s) Oral two times a day  glucagon  Injectable 1 milliGRAM(s) IntraMuscular once  hydrALAZINE 100 milliGRAM(s) Oral three times a day  insulin lispro (ADMELOG) corrective regimen sliding scale   SubCutaneous three times a day before meals  insulin lispro (ADMELOG) corrective regimen sliding scale   SubCutaneous at bedtime  isosorbide   mononitrate ER Tablet (IMDUR) 120 milliGRAM(s) Oral daily  lamoTRIgine 100 milliGRAM(s) Oral daily  mirtazapine 30 milliGRAM(s) Oral at bedtime  oxybutynin 5 milliGRAM(s) Oral two times a day  pantoprazole    Tablet 40 milliGRAM(s) Oral before breakfast  senna 2 Tablet(s) Oral at bedtime  sodium chloride 0.65% Nasal 1 Spray(s) Both Nostrils four times a day  venlafaxine XR. 150 milliGRAM(s) Oral daily    MEDICATIONS  (PRN):  acetaminophen     Tablet .. 650 milliGRAM(s) Oral every 6 hours PRN Temp greater or equal to 38.5C (101.3F), Moderate Pain (4 - 6)  ALBUTerol    90 MICROgram(s) HFA Inhaler 2 Puff(s) Inhalation every 6 hours PRN Shortness of Breath and/or Wheezing         Vitals log        ICU Vital Signs Last 24 Hrs  T(C): 36.8 (12 Jan 2022 20:35), Max: 36.8 (12 Jan 2022 20:35)  T(F): 98.3 (12 Jan 2022 20:35), Max: 98.3 (12 Jan 2022 20:35)  HR: 69 (12 Jan 2022 20:35) (61 - 69)  BP: 143/78 (12 Jan 2022 20:35) (129/71 - 179/94)  BP(mean): --  ABP: --  ABP(mean): --  RR: 18 (12 Jan 2022 20:35) (18 - 18)  SpO2: 95% (12 Jan 2022 20:35) (92% - 95%)           Input and Output:  I&O's Detail    11 Jan 2022 07:01  -  12 Jan 2022 07:00  --------------------------------------------------------  IN:  Total IN: 0 mL    OUT:    Voided (mL): 1000 mL  Total OUT: 1000 mL    Total NET: -1000 mL          Lab Data                        9.9    11.52 )-----------( 332      ( 12 Jan 2022 07:45 )             31.8     01-12    140  |  101  |  44<H>  ----------------------------<  140<H>  3.4<L>   |  29  |  2.30<H>    Ca    8.6      12 Jan 2022 07:45  Mg     2.5     01-11    TPro  5.9<L>  /  Alb  2.7<L>  /  TBili  0.3  /  DBili  x   /  AST  11<L>  /  ALT  21  /  AlkPhos  58  01-11            Review of Systems	      Objective     Physical Examination    heart s1s2  lung dec BS  abd soft      Pertinent Lab findings & Imaging      Nikko:  NO   Adequate UO     I&O's Detail    11 Jan 2022 07:01  -  12 Jan 2022 07:00  --------------------------------------------------------  IN:  Total IN: 0 mL    OUT:    Voided (mL): 1000 mL  Total OUT: 1000 mL    Total NET: -1000 mL               Discussed with:     Cultures:	        Radiology

## 2022-01-13 NOTE — PROGRESS NOTE ADULT - ASSESSMENT
CKD 4  Diabetes  CHF  Hypertension  h/o SLE  Hypokalemia  Anemia  COVID +, Pneumonia    Renal indices stable To continue current meds. On IV lasix. Retacrit for anemia. Monitor blood sugar levels. Insulin coverage as needed. Monitor h/h trend.   PRN Potassium supplementation. Monitor BP trend. Titrate BP meds as needed. Salt restriction. On IV abx. ID follow up.   Will follow electrolytes and renal function trend. Avoid nephrotoxic meds as possible.

## 2022-01-13 NOTE — PROGRESS NOTE ADULT - SUBJECTIVE AND OBJECTIVE BOX
[facility] -- Fifi Bliss, Kalpesh Valdovinos, Abraham Sheridan Savella  Office # [phone]      Follow Up:  CAD Volume overload     Subjective/Observations:   No events overnight resting comfortably in bed.  No complaints of chest pain, dyspnea, or palpitations reported. No signs of orthopnea or PND.  overnight notes read, patient was anxious, now sitting in chair appears in NAD , remains on RA     REVIEW OF SYSTEMS: All other review of systems is negative unless indicated above    PAST MEDICAL & SURGICAL HISTORY:  HTN (hypertension)  c/b multiple episodes of hypertensive urgency    HLD (hyperlipidemia)    Atrial fibrillation  first documented on EKG 10/7/2021    CAD (coronary artery disease)  s/p stents (not on plavix)    Type 2 diabetes mellitus  not on home insulin/ Meds    Anxiety  Depression  multiple psych medications    History of diverticulitis  07/2021    Diverticulosis  c/b GIB in 2020    Afib  on AC    Stage 3 chronic kidney disease    Anemia of chronic disease  Monoclonal Gammopathy-MGUS  pRBC transfusion 10/15/21    Chronic diastolic congestive heart failure    Multiple thyroid nodules    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  Had surgery ( April 2013 )    S/P tonsillectomy    S/P arthroscopic knee surgery  Bilateral ( 2005 )    Torsion of testicle  Had surgery at age 14    Pilonidal cyst  Had surgery ( 1969 )    S/P cataract surgery  Bilateral    H/O hernia repair        MEDICATIONS  (STANDING):  amLODIPine   Tablet 10 milliGRAM(s) Oral daily  apixaban 5 milliGRAM(s) Oral two times a day  ARIPiprazole 30 milliGRAM(s) Oral daily  aspirin enteric coated 81 milliGRAM(s) Oral daily  atorvastatin 10 milliGRAM(s) Oral at bedtime  budesonide 160 MICROgram(s)/formoterol 4.5 MICROgram(s) Inhaler 2 Puff(s) Inhalation two times a day  carvedilol 12.5 milliGRAM(s) Oral every 12 hours  cloNIDine 0.3 milliGRAM(s) Oral two times a day  cyanocobalamin 1000 MICROGram(s) Oral daily  dexAMETHasone     Tablet 6 milliGRAM(s) Oral daily  dextrose 40% Gel 15 Gram(s) Oral once  dextrose 5%. 1000 milliLiter(s) (50 mL/Hr) IV Continuous <Continuous>  dextrose 5%. 1000 milliLiter(s) (100 mL/Hr) IV Continuous <Continuous>  dextrose 50% Injectable 25 Gram(s) IV Push once  dextrose 50% Injectable 12.5 Gram(s) IV Push once  dextrose 50% Injectable 25 Gram(s) IV Push once  famotidine    Tablet 20 milliGRAM(s) Oral daily  folic acid 1 milliGRAM(s) Oral daily  furosemide    Tablet 40 milliGRAM(s) Oral two times a day  glucagon  Injectable 1 milliGRAM(s) IntraMuscular once  hydrALAZINE 100 milliGRAM(s) Oral three times a day  insulin lispro (ADMELOG) corrective regimen sliding scale   SubCutaneous three times a day before meals  insulin lispro (ADMELOG) corrective regimen sliding scale   SubCutaneous at bedtime  isosorbide   mononitrate ER Tablet (IMDUR) 120 milliGRAM(s) Oral daily  lamoTRIgine 100 milliGRAM(s) Oral daily  mirtazapine 30 milliGRAM(s) Oral at bedtime  oxybutynin 5 milliGRAM(s) Oral two times a day  pantoprazole    Tablet 40 milliGRAM(s) Oral before breakfast  senna 2 Tablet(s) Oral at bedtime  sodium chloride 0.65% Nasal 1 Spray(s) Both Nostrils four times a day  venlafaxine XR. 150 milliGRAM(s) Oral daily    MEDICATIONS  (PRN):  acetaminophen     Tablet .. 650 milliGRAM(s) Oral every 6 hours PRN Temp greater or equal to 38.5C (101.3F), Moderate Pain (4 - 6)  ALBUTerol    90 MICROgram(s) HFA Inhaler 2 Puff(s) Inhalation every 6 hours PRN Shortness of Breath and/or Wheezing      Allergies    No Known Allergies    Intolerances        Vital Signs Last 24 Hrs  T(C): 36.3 (13 Jan 2022 05:31), Max: 36.8 (12 Jan 2022 20:35)  T(F): 97.4 (13 Jan 2022 05:31), Max: 98.3 (12 Jan 2022 20:35)  HR: 70 (13 Jan 2022 05:31) (61 - 70)  BP: 169/91 (13 Jan 2022 05:31) (143/78 - 179/94)  BP(mean): --  RR: 18 (13 Jan 2022 05:31) (18 - 18)  SpO2: 94% (13 Jan 2022 05:31) (94% - 95%)    I&O's Summary    12 Jan 2022 07:01  -  13 Jan 2022 07:00  --------------------------------------------------------  IN: 0 mL / OUT: 250 mL / NET: -250 mL          PHYSICAL EXAM:  TELE: not on tele   Constitutional: NAD, awake and alert, obese  HEENT: Moist Mucous Membranes, Anicteric  Pulmonary: Non-labored, breath sounds are diminished   Cardiovascular: Regular, S1 and S2 nl, No murmurs, rubs, gallops or clicks  Gastrointestinal: Bowel Sounds present, soft, nontender.   Lymph: No lymphadenopathy. No peripheral edema.  Skin: No visible rashes or ulcers.  Psych:  Mood & affect appropriate    LABS: All Labs Reviewed:                        9.1    10.27 )-----------( 296      ( 13 Jan 2022 06:58 )             29.4                         9.9    11.52 )-----------( 332      ( 12 Jan 2022 07:45 )             31.8                         9.7    9.56  )-----------( 317      ( 11 Jan 2022 11:02 )             30.4     13 Jan 2022 06:58    139    |  103    |  45     ----------------------------<  142    3.9     |  29     |  2.20   12 Jan 2022 07:45    140    |  101    |  44     ----------------------------<  140    3.4     |  29     |  2.30   11 Jan 2022 11:02    140    |  100    |  39     ----------------------------<  249    3.7     |  33     |  2.30     Ca    8.4        13 Jan 2022 06:58  Ca    8.6        12 Jan 2022 07:45  Ca    8.2        11 Jan 2022 11:02  Mg     2.3       13 Jan 2022 06:58  Mg     2.5       11 Jan 2022 11:02    TPro  5.9    /  Alb  2.7    /  TBili  0.3    /  DBili  x      /  AST  11     /  ALT  21     /  AlkPhos  58     11 Jan 2022 11:02  TPro  6.5    /  Alb  3.0    /  TBili  0.4    /  DBili  x      /  AST  16     /  ALT  26     /  AlkPhos  60     10 Jan 2022 09:50        12 Lead ECG (01.03.22 @ 10:49) >    Ventricular Rate 94 BPM    Atrial Rate 91 BPM    QRS Duration 100 ms    Q-T Interval 378 ms    QTC Calculation(Bazett) 472 ms    R Axis -15 degrees    T Axis 261 degrees    Diagnosis Line Atrial fibrillation  Incomplete right bundle branch block  Moderate voltage criteria for LVH, may be normal variant  Nonspecific T wave abnormality  Prolonged QT  Abnormal ECG  Confirmed by Victorino Valdovinos MD ([id number]) on 1/3/2022 3:17:52 PM    < end of copied text >  < from: TTE Echo Complete w/o Contrast w/ Doppler (01.06.22 @ 10:52) >   EXAM:  ECHO TTE WO CON COMP W DOPP         PROCEDURE DATE:  01/06/2022        INTERPRETATION:  INDICATION: Dyspnea  Sonographer PH    Blood Pressure 177/86    Height 172.7 cm     Weight 104.4 kg    Dimensions:  LA 4.2       Normal Values: 2.0 - 4.0 cm  Ao 3.7        Normal Values: 2.0 - 3.8 cm  SEPTUM 1.4       Normal Values: 0.6 - 1.2 cm  PWT 1.4       Normal Values: 0.6 - 1.1 cm  LVIDd 5.2         Normal Values: 3.0 - 5.6 cm  LVIDs 4.2         Normal Values: 1.8 - 4.0 cm      OBSERVATIONS:  Technically difficult study  Mitral Valve: Mitral annular calcification with thickened leaflets, mild   MR.  Aortic Valve/Aorta: Calcified trileaflet aortic valve with decreased   opening. Peak transaortic valve gradient is 29.4 mmHg with a mean   transaortic valve gradient 14.7 mmHg. This consistent with mild aortic   stenosis.  Tricuspid Valve: Mild TR.  Pulmonic Valve: Not well-visualized  Left Atrium: Enlarged  Right Atrium: Not well-visualized  Left Ventricle: Moderate left ventricular hypertrophy with normal   systolic function, estimated LVEF of 55%.  Right Ventricle: Grossly normal size and systolic function.  Pericardium: no significant pericardial effusion.  Pulmonary/RV Pressure: estimated PA systolic pressure of 32mmHg        IMPRESSION:  Technically difficult study  Moderate left ventricular hypertrophy with normal systolic function,   estimated LVEF of 55%.  Grossly normal RV size and systolic function.  Calcified trileaflet aortic valve with mild aortic stenosis  Mild MR andTR.  No significant pericardial effusion.        < from: Xray Chest 1 View-PORTABLE IMMEDIATE (Xray Chest 1 View-PORTABLE IMMEDIATE .) (01.05.22 @ 08:14) >    ACC: [id number] EXAM:  XR CHEST PORTABLE IMMED 1V                        INTERPRETATION:  DATE OF STUDY: 1/5/22    PRIOR:12/24/21    CLINICAL INDICATION: New fever.    TECHNIQUE: portable chest.    FINDINGS:  There is stable cardiomegaly.  The left lung is clear.  Partial clearing of previously noted lower right lung airspace haziness.  New airspace opacity in mid-right lung just above right minor fissure.   New airspace opacity in upper right lung -these findings are concerning   for pneumonia right clinical setting.  No sizable pleural effusion. No pneumothorax.  Degenerative changes of the thoracic spine.    IMPRESSION:  Clear left lung.  New probable pneumonic infiltrates in mid and upper right lung.

## 2022-01-13 NOTE — PROGRESS NOTE ADULT - ASSESSMENT
75 yo M with PMH AFib on Eliquis of Type 2 DM (not on insulin), BPH, CAD s/p stents >4 years ago (not on Plavix), diverticulitis (hospital[facility] GIB 2/2 diverticulosis (2020), anxiety, Lupus, HTN, HLD, CKD stage 3, HepC, hx of blood clots in brain (s/p surgery 2013) living in [facility] group [facility] with shortness of breath and leg swelling. Found to be covid positive. Cardio consulted for elevated Troponins and CHF.    TTE:(10/21): Normal LV size with normal LVSF with LVEF 55-60%. LV diastolic function cannot determine due to atrial fibrillation. Mild mitral regurgitation is present. Mild aortic stenosis is  present. Mild tricuspid regurgitation is present . No evidence of any pulmonary hypertension    Volume overload (acute on chronic diastolic HF), CAD, HTN, Afib  - on PO lasix   - Continue to monitor strict I/O's, negative 250 cc 24 hours   - Troponin likely demand ischemia in setting of above, plan for outpatient stress when underlying COVID  resolves however pt remains asymptomatic   - EKG NSR, no ischemic changes noted  - Hx of Afib continue home Eliquis.  rate-controlled   - For Hx of CAD, continue ASA, BB, and statin  - contnue  Norvasc, Imdur, Hydralazine, Clonidine and coreg   Dc planning   Monitor and replete electrolytes. Keep K>4.0 and Mg>2.0.  Carmelina Leger FNP-C  Cardiology NP  [id number] [phone]

## 2022-01-13 NOTE — SWALLOW VFSS/MBS ASSESSMENT ADULT - ROSENBEK'S PENETRATION ASPIRATION SCALE
(8) contrast passes glottis, visible subglottic residue remains, absent patient response (aspiration) with large cup sips of moderately thick x1 and mildly thick liquids x1, not reduplicated with single, small cup sips across multiple trials/(3) contrast remains above the vocal cords, visible residue remains (penetration) (1) no aspiration, contrast does not enter airway

## 2022-01-13 NOTE — SWALLOW VFSS/MBS ASSESSMENT ADULT - RECOMMENDED FEEDING/EATING TECHNIQUES
*ALL LIQUIDS VIA SINGLE, SMALL CUP SIPS ONLY/allow for swallow between intakes/alternate food with liquid/check mouth frequently for oral residue/pocketing/no straws/oral hygiene/position upright (90 degrees)/small sips/bites

## 2022-01-13 NOTE — SWALLOW VFSS/MBS ASSESSMENT ADULT - ADDITIONAL RECOMMENDATIONS
This department will continue to follow Patient for dysphagia remediation during this admission, as schedule permits This department will continue to follow Patient for dysphagia remediation during this admission, as schedule permits. Recommend swallowing therapy upon discharge from [facility] to maximize swallow function (rehab vs. home care vs. out patient- out patient services can be scheduled at [facility] at [phone]).

## 2022-01-13 NOTE — PROGRESS NOTE ADULT - SUBJECTIVE AND OBJECTIVE BOX
[facility]  INFECTIOUS DISEASES - Marcelo Garner, [place] [address]  Tel: [phone]     Fax: [phone]  =======================================================    TRIPP ZARATE [id number]    Follow up: No fevers. On room air. Occasional cough without sputum production. Denies any pain or SOB.    Allergies:  No Known Allergies      Antibiotics:  acetaminophen     Tablet .. 650 milliGRAM(s) Oral every 6 hours PRN  ALBUTerol    90 MICROgram(s) HFA Inhaler 2 Puff(s) Inhalation every 6 hours PRN  amLODIPine   Tablet 10 milliGRAM(s) Oral daily  apixaban 5 milliGRAM(s) Oral two times a day  ARIPiprazole 30 milliGRAM(s) Oral daily  aspirin enteric coated 81 milliGRAM(s) Oral daily  atorvastatin 10 milliGRAM(s) Oral at bedtime  budesonide 160 MICROgram(s)/formoterol 4.5 MICROgram(s) Inhaler 2 Puff(s) Inhalation two times a day  carvedilol 12.5 milliGRAM(s) Oral every 12 hours  cloNIDine 0.3 milliGRAM(s) Oral two times a day  cyanocobalamin 1000 MICROGram(s) Oral daily  dexAMETHasone     Tablet 6 milliGRAM(s) Oral daily  dextrose 40% Gel 15 Gram(s) Oral once  dextrose 5%. 1000 milliLiter(s) IV Continuous <Continuous>  dextrose 5%. 1000 milliLiter(s) IV Continuous <Continuous>  dextrose 50% Injectable 25 Gram(s) IV Push once  dextrose 50% Injectable 12.5 Gram(s) IV Push once  dextrose 50% Injectable 25 Gram(s) IV Push once  famotidine    Tablet 20 milliGRAM(s) Oral daily  folic acid 1 milliGRAM(s) Oral daily  furosemide    Tablet 40 milliGRAM(s) Oral two times a day  glucagon  Injectable 1 milliGRAM(s) IntraMuscular once  hydrALAZINE 100 milliGRAM(s) Oral three times a day  insulin lispro (ADMELOG) corrective regimen sliding scale   SubCutaneous three times a day before meals  insulin lispro (ADMELOG) corrective regimen sliding scale   SubCutaneous at bedtime  isosorbide   mononitrate ER Tablet (IMDUR) 120 milliGRAM(s) Oral daily  lamoTRIgine 100 milliGRAM(s) Oral daily  mirtazapine 30 milliGRAM(s) Oral at bedtime  oxybutynin 5 milliGRAM(s) Oral two times a day  pantoprazole    Tablet 40 milliGRAM(s) Oral before breakfast  senna 2 Tablet(s) Oral at bedtime  sodium chloride 0.65% Nasal 1 Spray(s) Both Nostrils four times a day  venlafaxine XR. 150 milliGRAM(s) Oral daily       REVIEW OF SYSTEMS:  CONSTITUTIONAL: No Fevers   CARDIOVASCULAR:  No chest pain   RESPIRATORY:  see history  GASTROINTESTINAL:  No abdominal pain. no nausea or vomiting  GENITOURINARY:  No dysuria  NEUROLOGIC:  No headache, no dizziness     Physical Exam:  ICU Vital Signs Last 24 Hrs  T(C): 36.6 (13 Jan 2022 12:39), Max: 36.8 (12 Jan 2022 20:35)  T(F): 97.9 (13 Jan 2022 12:39), Max: 98.3 (12 Jan 2022 20:35)  HR: 67 (13 Jan 2022 12:39) (61 - 70)  BP: 136/77 (13 Jan 2022 12:39) (136/77 - 169/91)  BP(mean): --  ABP: --  ABP(mean): --  RR: 18 (13 Jan 2022 12:39) (18 - 18)  SpO2: 97% (13 Jan 2022 12:39) (94% - 97%)     GEN: NAD  HEENT: normocephalic and atraumatic.  NECK: Supple.   LUNGS: Normal respiratory effort  HEART: Regular rate and rhythm  ABDOMEN: Soft, nontender  EXTREMITIES: trace bipedal edema    Labs:  01-13    139  |  103  |  45<H>  ----------------------------<  142<H>  3.9   |  29  |  2.20<H>    Ca    8.4<L>      13 Jan 2022 06:58  Mg     2.3     01-13                            9.1    10.27 )-----------( 296      ( 13 Jan 2022 06:58 )             29.4             RECENT CULTURES:  01-06 @ 12:37 Clean Catch Clean Catch (Midstream) Proteus mirabilis    10,000 - 49,000 CFU/mL Proteus mirabilis  <10,000 CFU/ml Normal Urogenital jadon present        01-05 @ 12:15 .Blood Blood-Peripheral     No Growth Final        01-03 @ 16:15 .Blood Blood-Peripheral     No Growth Final        01-03 @ 16:12 Clean Catch Clean Catch (Midstream)     No growth              All imaging and data are reviewed.

## 2022-01-14 ENCOUNTER — TRANSCRIPTION ENCOUNTER (OUTPATIENT)
Age: 77
End: 2022-01-14

## 2022-01-14 LAB
ANION GAP SERPL CALC-SCNC: 6 MMOL/L — SIGNIFICANT CHANGE UP (ref 5–17)
BASOPHILS # BLD AUTO: 0.01 K/UL — SIGNIFICANT CHANGE UP (ref 0–0.2)
BASOPHILS NFR BLD AUTO: 0.1 % — SIGNIFICANT CHANGE UP (ref 0–2)
BUN SERPL-MCNC: 49 MG/DL — HIGH (ref 7–23)
CALCIUM SERPL-MCNC: 8.4 MG/DL — LOW (ref 8.5–10.1)
CHLORIDE SERPL-SCNC: 103 MMOL/L — SIGNIFICANT CHANGE UP (ref 96–108)
CO2 SERPL-SCNC: 30 MMOL/L — SIGNIFICANT CHANGE UP (ref 22–31)
CREAT SERPL-MCNC: 2.5 MG/DL — HIGH (ref 0.5–1.3)
EOSINOPHIL # BLD AUTO: 0.04 K/UL — SIGNIFICANT CHANGE UP (ref 0–0.5)
EOSINOPHIL NFR BLD AUTO: 0.4 % — SIGNIFICANT CHANGE UP (ref 0–6)
GLUCOSE SERPL-MCNC: 198 MG/DL — HIGH (ref 70–99)
HCT VFR BLD CALC: 29 % — LOW (ref 39–50)
HGB BLD-MCNC: 8.9 G/DL — LOW (ref 13–17)
IMM GRANULOCYTES NFR BLD AUTO: 0.7 % — SIGNIFICANT CHANGE UP (ref 0–1.5)
LYMPHOCYTES # BLD AUTO: 1.08 K/UL — SIGNIFICANT CHANGE UP (ref 1–3.3)
LYMPHOCYTES # BLD AUTO: 10.1 % — LOW (ref 13–44)
MCHC RBC-ENTMCNC: 27.1 PG — SIGNIFICANT CHANGE UP (ref 27–34)
MCHC RBC-ENTMCNC: 30.7 GM/DL — LOW (ref 32–36)
MCV RBC AUTO: 88.4 FL — SIGNIFICANT CHANGE UP (ref 80–100)
MONOCYTES # BLD AUTO: 0.56 K/UL — SIGNIFICANT CHANGE UP (ref 0–0.9)
MONOCYTES NFR BLD AUTO: 5.2 % — SIGNIFICANT CHANGE UP (ref 2–14)
NEUTROPHILS # BLD AUTO: 8.9 K/UL — HIGH (ref 1.8–7.4)
NEUTROPHILS NFR BLD AUTO: 83.5 % — HIGH (ref 43–77)
NRBC # BLD: 0 /100 WBCS — SIGNIFICANT CHANGE UP (ref 0–0)
PLATELET # BLD AUTO: 267 K/UL — SIGNIFICANT CHANGE UP (ref 150–400)
POTASSIUM SERPL-MCNC: 3.6 MMOL/L — SIGNIFICANT CHANGE UP (ref 3.5–5.3)
POTASSIUM SERPL-SCNC: 3.6 MMOL/L — SIGNIFICANT CHANGE UP (ref 3.5–5.3)
RBC # BLD: 3.28 M/UL — LOW (ref 4.2–5.8)
RBC # FLD: 18 % — HIGH (ref 10.3–14.5)
SODIUM SERPL-SCNC: 139 MMOL/L — SIGNIFICANT CHANGE UP (ref 135–145)
WBC # BLD: 10.67 K/UL — HIGH (ref 3.8–10.5)
WBC # FLD AUTO: 10.67 K/UL — HIGH (ref 3.8–10.5)

## 2022-01-14 PROCEDURE — 99232 SBSQ HOSP IP/OBS MODERATE 35: CPT

## 2022-01-14 RX ORDER — ERYTHROPOIETIN 10000 [IU]/ML
10000 INJECTION, SOLUTION INTRAVENOUS; SUBCUTANEOUS
Refills: 0 | Status: DISCONTINUED | OUTPATIENT
Start: 2022-01-14 | End: 2022-01-18

## 2022-01-14 RX ADMIN — Medication 81 MILLIGRAM(S): at 12:14

## 2022-01-14 RX ADMIN — Medication 100 MILLIGRAM(S): at 21:19

## 2022-01-14 RX ADMIN — Medication 2: at 12:13

## 2022-01-14 RX ADMIN — Medication 0.3 MILLIGRAM(S): at 05:21

## 2022-01-14 RX ADMIN — FAMOTIDINE 20 MILLIGRAM(S): 10 INJECTION INTRAVENOUS at 12:16

## 2022-01-14 RX ADMIN — Medication 40 MILLIGRAM(S): at 05:21

## 2022-01-14 RX ADMIN — ARIPIPRAZOLE 30 MILLIGRAM(S): 15 TABLET ORAL at 12:14

## 2022-01-14 RX ADMIN — Medication 2: at 08:03

## 2022-01-14 RX ADMIN — Medication 5 MILLIGRAM(S): at 17:37

## 2022-01-14 RX ADMIN — BUDESONIDE AND FORMOTEROL FUMARATE DIHYDRATE 2 PUFF(S): 160; 4.5 AEROSOL RESPIRATORY (INHALATION) at 17:39

## 2022-01-14 RX ADMIN — APIXABAN 5 MILLIGRAM(S): 2.5 TABLET, FILM COATED ORAL at 05:21

## 2022-01-14 RX ADMIN — CARVEDILOL PHOSPHATE 12.5 MILLIGRAM(S): 80 CAPSULE, EXTENDED RELEASE ORAL at 17:36

## 2022-01-14 RX ADMIN — Medication 0.3 MILLIGRAM(S): at 17:36

## 2022-01-14 RX ADMIN — Medication 150 MILLIGRAM(S): at 12:17

## 2022-01-14 RX ADMIN — Medication 5 MILLIGRAM(S): at 05:21

## 2022-01-14 RX ADMIN — LAMOTRIGINE 100 MILLIGRAM(S): 25 TABLET, ORALLY DISINTEGRATING ORAL at 12:16

## 2022-01-14 RX ADMIN — Medication 650 MILLIGRAM(S): at 10:11

## 2022-01-14 RX ADMIN — Medication 1 MILLIGRAM(S): at 12:15

## 2022-01-14 RX ADMIN — Medication 1 SPRAY(S): at 17:38

## 2022-01-14 RX ADMIN — SENNA PLUS 2 TABLET(S): 8.6 TABLET ORAL at 21:19

## 2022-01-14 RX ADMIN — MIRTAZAPINE 30 MILLIGRAM(S): 45 TABLET, ORALLY DISINTEGRATING ORAL at 21:19

## 2022-01-14 RX ADMIN — BUDESONIDE AND FORMOTEROL FUMARATE DIHYDRATE 2 PUFF(S): 160; 4.5 AEROSOL RESPIRATORY (INHALATION) at 05:20

## 2022-01-14 RX ADMIN — PANTOPRAZOLE SODIUM 40 MILLIGRAM(S): 20 TABLET, DELAYED RELEASE ORAL at 05:21

## 2022-01-14 RX ADMIN — Medication 0.5 MILLIGRAM(S): at 12:31

## 2022-01-14 RX ADMIN — PREGABALIN 1000 MICROGRAM(S): 225 CAPSULE ORAL at 12:15

## 2022-01-14 RX ADMIN — CARVEDILOL PHOSPHATE 12.5 MILLIGRAM(S): 80 CAPSULE, EXTENDED RELEASE ORAL at 05:21

## 2022-01-14 RX ADMIN — ISOSORBIDE MONONITRATE 120 MILLIGRAM(S): 60 TABLET, EXTENDED RELEASE ORAL at 12:32

## 2022-01-14 RX ADMIN — ERYTHROPOIETIN 10000 UNIT(S): 10000 INJECTION, SOLUTION INTRAVENOUS; SUBCUTANEOUS at 13:39

## 2022-01-14 RX ADMIN — AMLODIPINE BESYLATE 10 MILLIGRAM(S): 2.5 TABLET ORAL at 05:21

## 2022-01-14 RX ADMIN — APIXABAN 5 MILLIGRAM(S): 2.5 TABLET, FILM COATED ORAL at 17:36

## 2022-01-14 RX ADMIN — ATORVASTATIN CALCIUM 10 MILLIGRAM(S): 80 TABLET, FILM COATED ORAL at 21:19

## 2022-01-14 RX ADMIN — Medication 1 SPRAY(S): at 05:20

## 2022-01-14 RX ADMIN — Medication 100 MILLIGRAM(S): at 05:21

## 2022-01-14 RX ADMIN — Medication 650 MILLIGRAM(S): at 11:10

## 2022-01-14 RX ADMIN — Medication 1 SPRAY(S): at 21:19

## 2022-01-14 RX ADMIN — Medication 1 SPRAY(S): at 12:17

## 2022-01-14 RX ADMIN — Medication 100 MILLIGRAM(S): at 13:40

## 2022-01-14 NOTE — PROGRESS NOTE ADULT - ASSESSMENT
77 yo M with PMH AFib on Eliquis of Type 2 DM (not on insulin), BPH, CAD s/p stents >4 years ago (not on Plavix), diverticulitis (hospitalized 07/2020 at [facility]), GIB 2/2 diverticulosis (2020), anxiety, Lupus, HTN, HLD, CKD stage 3, HepC, hx of blood clots in brain (s/p surgery 2013) living in a group home [facility] presenting to [facility] with shortness of breath and leg swelling. Found to be covid positive. Cardio consulted for elevated Troponins and CHF.    TTE:(10/21): Normal LV size with normal LVSF with LVEF 55-60%. LV diastolic function cannot determine due to atrial fibrillation. Mild mitral regurgitation is present. Mild aortic stenosis is  present. Mild tricuspid regurgitation is present . No evidence of any pulmonary hypertension    Volume overload (acute on chronic diastolic HF), CAD, HTN, Afib  - Compensated from HF standpoint.  Tolerating RA.  Will hold Lasix in am in the setting of worsening creatinine; monitor volume status  - Renal following  - Continue to monitor strict I/O's  - Troponin likely demand ischemia in setting of above; plan for outpatient stress.  No anginal symptoms  - EKG NSR, no ischemic changes noted.    - Hx of Afib, continue home Eliquis.  Rate-controlled.  Continue BB   - Monitor and replete electrolytes. Keep K>4.0 and Mg>2.0.  - For Hx of CAD, continue ASA, BB, and statin  - BP stable and controlled for the most part.  Continue  Norvasc, Imdur, Hydralazine, Clonidine and Coreg     D/C planning   Will continue to follow    Shilpa Kirkpatrick DNP, NP-C  Cardiology   Spectra #[phone]

## 2022-01-14 NOTE — DISCHARGE NOTE PROVIDER - HOSPITAL COURSE
75 yo M PMH of A fib (on Eliquis) , MGUS s/p PRBC transfusion 10/21, anxiety/depression, CAD s/p stents (not on plavix), CHF, Diverticulosis, HLD, HTN, thyroid nodules, CKD stage 3, DM2 presents to the ED with SOB and LE edema. The patient lives in a group home and is unsure why the supervisor suggested he come to the ED. He reports difficulty breathing for a few months now, but it has been worsening over the last week. His shortness of breath is primarily on exertion. The patient also has wheezing and a dry cough. He states he has been sleeping in a sitting position, but does not understand why he had to come to the ED, states his LE swelling does not seem to be worse than his baseline. Also complaining of left sided chest pain which has been ongoing for a few days now, claims it to be 2/2 reflux. Denies radiation of the pain and diaphoresis. Denies fevers/chills, n/v/c/d, myalgias.     COVID-19 vaccinated with Moderna x2 in 5/2021, found to be COVID+    ED course:   Vitals:  BP:172/86    HR:88   Temp:97F   RR:16  O2: 100% on 10L/min NRB   Patient received: 324mg ASA x1, 1L LR x1  Labs: H/H 9.2/29.1, INR 1.47, Troponin I 114.7--> 117.8, K+ 3.3, BUN/Cr 28/2.30, Procal 0.20, BNP 4574  UA: occasional bacteria, small blood, 500 protein, 0-2 granular casts   EKG: Afib, Incomplete RBBB, Moderate voltage criteria for LVH, may be normal variant nonspecific T wave abnormality, Prolonged QT  CXR: There is a prominent cardiac silhouette and of the bronchovascular markings at the bilateral perihilar lower lung zones. Again noted is minimal blunting at the right costophrenic angle which may reflect trace pleural effusion and/or pleural thickening.    Pt. was admitted with Cardiology, ID, and Pulmonary consultation.  Pt. was started on IV diuretics.  Echo showed an EF of 55%.  Pt. also received a course of Remdesivir and Decadron.  Urine culture and blood cultures have shown no significant growth.   Pt. had clinically improved and was able to be weaned off supplemental oxygen. 75 yo M PMH of A fib (on Eliquis) , MGUS s/p PRBC transfusion 10/21, anxiety/depression, CAD s/p stents (not on plavix), CHF, Diverticulosis, HLD, HTN, thyroid nodules, CKD stage 3, DM2 presents to the ED with SOB and LE edema. The patient lives in a group home and is unsure why the supervisor suggested he come to the ED. He reports difficulty breathing for a few months now, but it has been worsening over the last week. His shortness of breath is primarily on exertion. The patient also has wheezing and a dry cough. He states he has been sleeping in a sitting position, but does not understand why he had to come to the ED, states his LE swelling does not seem to be worse than his baseline. Also complaining of left sided chest pain which has been ongoing for a few days now, claims it to be 2/2 reflux. Denies radiation of the pain and diaphoresis. Denies fevers/chills, n/v/c/d, myalgias.     COVID-19 vaccinated with Moderna x2 in 5/2021, found to be COVID+     ED course:   Vitals:  BP:172/86    HR:88   Temp:97F   RR:16  O2: 100% on 10L/min NRB   Patient received: 324mg ASA x1, 1L LR x1  Labs: H/H 9.2/29.1, INR 1.47, Troponin I 114.7--> 117.8, K+ 3.3, BUN/Cr 28/2.30, Procal 0.20, BNP 4574  UA: occasional bacteria, small blood, 500 protein, 0-2 granular casts   EKG: Afib, Incomplete RBBB, Moderate voltage criteria for LVH, may be normal variant nonspecific T wave abnormality, Prolonged QT  CXR: There is a prominent cardiac silhouette and of the bronchovascular markings at the bilateral perihilar lower lung zones. Again noted is minimal blunting at the right costophrenic angle which may reflect trace pleural effusion and/or pleural thickening.    Pt. was admitted with Cardiology, ID, and Pulmonary consultation.  Pt. was started on IV diuretics.  Echo showed an EF of 55%.  Pt. also received a course of Remdesivir and Decadron.  Urine culture and blood cultures have shown no significant growth.   Pt. had clinically improved and was able to be weaned off supplemental oxygen.      Covid positive since January 10 on record .   Home or room isolation for 7 more days   Staff to gown up and wear mask to attend during isolation period

## 2022-01-14 NOTE — PROGRESS NOTE ADULT - SUBJECTIVE AND OBJECTIVE BOX
CHIEF COMPLAINT/INTERVAL HISTORY:  Pt. seen and evaluated for acute hypoxic respiratory failure 2/2 COVID pneumonia.  Pt. is in no distress.  Eating lunch.  Remains on room air with SpO2 in 90th%.  Denies having any SOB.      REVIEW OF SYSTEMS:    Vital Signs Last 24 Hrs  T(C): 36.2 (14 Jan 2022 12:24), Max: 36.6 (13 Jan 2022 12:39)  T(F): 97.1 (14 Jan 2022 12:24), Max: 97.9 (13 Jan 2022 12:39)  HR: 72 (14 Jan 2022 12:24) (61 - 72)  BP: 132/78 (14 Jan 2022 12:24) (132/78 - 162/74)  BP(mean): --  RR: 16 (14 Jan 2022 12:24) (16 - 19)  SpO2: 96% (14 Jan 2022 12:24) (93% - 97%)    PHYSICAL EXAM:  GENERAL: NAD  HEENT: EOMI, hearing normal, conjunctiva and sclera clear  Chest: Diminished BS at bases, no wheezing  CV: S1S2, RRR,   GI: soft, +BS, NT/ND  Musculoskeletal: no edema  Psychiatric: affect nL, mood nL  Skin: warm and dry    LABS:                        8.9    10.67 )-----------( 267      ( 14 Jan 2022 06:25 )             29.0     01-14    139  |  103  |  49<H>  ----------------------------<  198<H>  3.6   |  30  |  2.50<H>    Ca    8.4<L>      14 Jan 2022 06:25  Mg     2.3     01-13            Assessment and Plan:  77yo M from group home, with PMH of DM2, HTN, HLD, CAD (s/p stents), Afib (on Eliquis), stage 3 CKD, hx of MGUS; a/w acute hypoxic and hypercarbic respiratory failure due to COVID-19 PNA, and acute on chronic diastolic CHF.    Acute hypoxic and hypercarbic respiratory failure due to COVID19 PNA and acute CHF:  -blood gas improved significantly   -completed remdesivir 5-day course  -completing course of dexamethasone 6mg daily   -has been on NC - now tolerated RA at rest  -airborne/contact precautions  -Pulm/ID following ([facility]), recs appreciated      Acute on chronic diastolic CHF:  - has been on lasix 40mg IV BID; strict I/Os; monitoring hemodynamics/renal function w/diuresis  - transitioned to lasix 40mg PO BID.  Now off diuretics per cardiology.    - monitor O2, daily weights   - cardio ([facility]), recs appreciated    Hypertensive urgency:  - continue coreg, clonidine, norvasc, hydralazine, lasix  - BP control improving after increasing clonidine to 0.3mg po BID and increasing coreg to 12.5mg po BID   - cardio ([facility]), recs appreciated  - monitor BP    Acute epistaxis:  - likely was mucosal bleed from drying O2 and bled longer due to Eliquis, ASA, and elevated BUN  - bleeding controlled with Afrin/lidocaine soaked cotton with pressure for 5 minutes per ENT recs  - ENT ([facility]), recs appreciated  - Ocean spray as needed    ARIELLA on CKD 3:  - baseline Cr appears to be ~2.0 - renally dose meds, no nephrotoxics  - Cr up to 2.5, now off lasix  - nephro ([facility]), recs appreciated    Anemia:  - pt with hx of MGUS and CKD likely contributing to anemia, along with current acute illness  - Hgb had trended down to 7.5, now improving and >8  - no gross hematuria, hematochezia, melena; monitor for signs of blood loss  - discussed blood transfusion with the pt who is hesitant as he is feeling overall better  - Retacrit per nephro orders for the renal failure component of anemia   - found to be iron deficient, s/p venofer 100mg IV q24h x3 doses  - goal Hgb > 8    Afib:  - c/w Eliquis  - c/w coreg  - cardio ([facility]), recs appreciated    Type 2 DM:  - goal glucose 100-180  - monitor FSG   - c/w HISS    psych - continue abilify, lamictal, effexor    Dysphagia:  s/p Modified barium swallow study and now on Consistent carb/DASH diet with mildly thick liquids.  Pt. has been having intermittent episodes of feeling food getting stuck and N/V.  He was offered GI consultation/evaluation but has declined.      DVT prophy   - c/w Eliquis    Disp: per KIERA, pt's group home cannot accept him if he is COVID positive

## 2022-01-14 NOTE — DISCHARGE NOTE PROVIDER - NSDCMRMEDTOKEN_GEN_ALL_CORE_FT
amLODIPine 10 mg oral tablet: 1 tab(s) orally once a day  apixaban 5 mg oral tablet: 1 tab(s) orally every 12 hours  ARIPiprazole 30 mg oral tablet: 1 tab(s) orally once a day  [place] 1064 mg/3.9 mL intramuscular suspension, extended release: 1 dose(s) intramuscular every 8 weeks  aspirin 81 mg oral delayed release tablet: 1 tab(s) orally once a day  atorvastatin 10 mg oral tablet: 1 tab(s) orally once a day (at bedtime)  budesonide-formoterol 160 mcg-4.5 mcg/inh inhalation aerosol: 2 puff(s) inhaled 2 times a day   carvedilol 6.25 mg oral tablet: 1 tab(s) orally 2 times a day  cloNIDine 0.2 mg oral tablet: 1 tab(s) orally 2 times a day  cyanocobalamin 1000 mcg oral tablet: 1 tab(s) orally once a day  famotidine 20 mg oral tablet: 1 tab(s) orally once a day  folic acid 1 mg oral tablet: 1 tab(s) orally once a day  furosemide 20 mg oral tablet: 1 tab(s) orally 2 times a day  hydrALAZINE 100 mg oral tablet: 1 tab(s) orally 3 times a day  isosorbide mononitrate 30 mg oral tablet, extended release: 3 tab(s) orally once a day  Total 90 mg daily by mouth  lamoTRIgine 100 mg oral tablet: 1 tab(s) orally once a day  mirtazapine 30 mg oral tablet: 1 tab(s) orally once a day (at bedtime)  omeprazole 40 mg oral delayed release capsule: 1 cap(s) orally once a day  oxybutynin 15 mg/24 hr oral tablet, extended release: 1 tab(s) orally once a day  Potassium Chloride (Eqv-Klor-Con M20) 20 mEq oral tablet, extended release: 1 tab(s) orally once a day   Senna 8.6 mg oral tablet: 2 tab(s) orally once a day (at bedtime)  Vascepa 1 g oral capsule: 2 cap(s) orally 2 times a day  venlafaxine 150 mg oral capsule, extended release: 1 cap(s) orally once a day   acetaminophen 325 mg oral tablet: 2 tab(s) orally every 6 hours, As needed, Temp greater or equal to 38.5C (101.3F), Moderate Pain (4 - 6)  amLODIPine 10 mg oral tablet: 1 tab(s) orally once a day  apixaban 5 mg oral tablet: 1 tab(s) orally every 12 hours  ARIPiprazole 30 mg oral tablet: 1 tab(s) orally once a day  [place] 1064 mg/3.9 mL intramuscular suspension, extended release: 1 dose(s) intramuscular every 8 weeks  aspirin 81 mg oral delayed release tablet: 1 tab(s) orally once a day  atorvastatin 10 mg oral tablet: 1 tab(s) orally once a day (at bedtime)  budesonide-formoterol 160 mcg-4.5 mcg/inh inhalation aerosol: 2 puff(s) inhaled 2 times a day   carvedilol 6.25 mg oral tablet: 1 tab(s) orally 2 times a day  cloNIDine 0.2 mg oral tablet: 1 tab(s) orally 2 times a day  cyanocobalamin 1000 mcg oral tablet: 1 tab(s) orally once a day  famotidine 20 mg oral tablet: 1 tab(s) orally once a day  folic acid 1 mg oral tablet: 1 tab(s) orally once a day  furosemide 20 mg oral tablet: 1 tab(s) orally 2 times a day  hydrALAZINE 100 mg oral tablet: 1 tab(s) orally 3 times a day  isosorbide mononitrate 30 mg oral tablet, extended release: 3 tab(s) orally once a day  Total 90 mg daily by mouth  lamoTRIgine 100 mg oral tablet: 1 tab(s) orally once a day  metFORMIN 500 mg oral tablet: 1 tab(s) orally 2 times a day   mirtazapine 30 mg oral tablet: 1 tab(s) orally once a day (at bedtime)  omeprazole 40 mg oral delayed release capsule: 1 cap(s) orally once a day  oxybutynin 15 mg/24 hr oral tablet, extended release: 1 tab(s) orally once a day  Potassium Chloride (Eqv-Klor-Con M20) 20 mEq oral tablet, extended release: 1 tab(s) orally once a day   Senna 8.6 mg oral tablet: 2 tab(s) orally once a day (at bedtime)  Vascepa 1 g oral capsule: 2 cap(s) orally 2 times a day  venlafaxine 150 mg oral capsule, extended release: 1 cap(s) orally once a day

## 2022-01-14 NOTE — DISCHARGE NOTE PROVIDER - CARE PROVIDERS DIRECT ADDRESSES
[email or web address]

## 2022-01-14 NOTE — PROGRESS NOTE ADULT - ASSESSMENT
CKD 4  Diabetes  CHF  Hypertension  h/o SLE  Hypokalemia  Anemia  COVID +, Pneumonia    Renal indices stable To continue current meds. Lasix held because of elevated creatinine. Resume Po dose when stable.   Retacrit for anemia. Monitor blood sugar levels. Insulin coverage as needed. Monitor h/h trend.   PRN Potassium supplementation. Monitor BP trend. Titrate BP meds as needed. Salt restriction. On IV abx. ID follow up.   Will follow electrolytes and renal function trend. Avoid nephrotoxic meds as possible.

## 2022-01-14 NOTE — PROGRESS NOTE ADULT - SUBJECTIVE AND OBJECTIVE BOX
Date/Time Patient Seen:  		  Referring MD:   Data Reviewed	       Patient is a 76y old  Male who presents with a chief complaint of CHF exacerbation (13 Jan 2022 14:09)      Subjective/HPI     PAST MEDICAL & SURGICAL HISTORY:  Hypertension    Diabetes mellitus    Lupus    Hepatitis C    Anxiety and depression    CAD (coronary artery disease)  s/p stents    Diverticulosis    Hyperlipidemia    HTN (hypertension)  c/b multiple episodes of hypertensive urgency    HLD (hyperlipidemia)    Atrial fibrillation  first documented on EKG 10/7/2021    CAD (coronary artery disease)  s/p stents (not on plavix)    Type 2 diabetes mellitus  not on home insulin/ Meds    Anxiety  Depression  multiple psych medications    History of diverticulitis  07/2021    Diverticulosis  c/b GIB in 2020    Afib  on AC    Stage 3 chronic kidney disease    Anemia of chronic disease  Monoclonal Gammopathy-MGUS  pRBC transfusion 10/15/21    Chronic diastolic congestive heart failure    Multiple thyroid nodules    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  Had surgery ( April 2013 )    S/P tonsillectomy    S/P arthroscopic knee surgery  Bilateral ( 2005 )    Torsion of testicle  Had surgery at age 14    Pilonidal cyst  Had surgery ( 1969 )    S/P cataract surgery  Bilateral    H/O hernia repair          Medication list         MEDICATIONS  (STANDING):  amLODIPine   Tablet 10 milliGRAM(s) Oral daily  apixaban 5 milliGRAM(s) Oral two times a day  ARIPiprazole 30 milliGRAM(s) Oral daily  aspirin enteric coated 81 milliGRAM(s) Oral daily  atorvastatin 10 milliGRAM(s) Oral at bedtime  budesonide 160 MICROgram(s)/formoterol 4.5 MICROgram(s) Inhaler 2 Puff(s) Inhalation two times a day  carvedilol 12.5 milliGRAM(s) Oral every 12 hours  cloNIDine 0.3 milliGRAM(s) Oral two times a day  cyanocobalamin 1000 MICROGram(s) Oral daily  dexAMETHasone     Tablet 6 milliGRAM(s) Oral daily  dextrose 40% Gel 15 Gram(s) Oral once  dextrose 5%. 1000 milliLiter(s) (50 mL/Hr) IV Continuous <Continuous>  dextrose 5%. 1000 milliLiter(s) (100 mL/Hr) IV Continuous <Continuous>  dextrose 50% Injectable 25 Gram(s) IV Push once  dextrose 50% Injectable 12.5 Gram(s) IV Push once  dextrose 50% Injectable 25 Gram(s) IV Push once  famotidine    Tablet 20 milliGRAM(s) Oral daily  folic acid 1 milliGRAM(s) Oral daily  furosemide    Tablet 40 milliGRAM(s) Oral two times a day  glucagon  Injectable 1 milliGRAM(s) IntraMuscular once  hydrALAZINE 100 milliGRAM(s) Oral three times a day  insulin lispro (ADMELOG) corrective regimen sliding scale   SubCutaneous three times a day before meals  insulin lispro (ADMELOG) corrective regimen sliding scale   SubCutaneous at bedtime  isosorbide   mononitrate ER Tablet (IMDUR) 120 milliGRAM(s) Oral daily  lamoTRIgine 100 milliGRAM(s) Oral daily  mirtazapine 30 milliGRAM(s) Oral at bedtime  oxybutynin 5 milliGRAM(s) Oral two times a day  pantoprazole    Tablet 40 milliGRAM(s) Oral before breakfast  senna 2 Tablet(s) Oral at bedtime  sodium chloride 0.65% Nasal 1 Spray(s) Both Nostrils four times a day  venlafaxine XR. 150 milliGRAM(s) Oral daily    MEDICATIONS  (PRN):  acetaminophen     Tablet .. 650 milliGRAM(s) Oral every 6 hours PRN Temp greater or equal to 38.5C (101.3F), Moderate Pain (4 - 6)  ALBUTerol    90 MICROgram(s) HFA Inhaler 2 Puff(s) Inhalation every 6 hours PRN Shortness of Breath and/or Wheezing         Vitals log        ICU Vital Signs Last 24 Hrs  T(C): 36.3 (14 Jan 2022 05:01), Max: 36.6 (13 Jan 2022 12:39)  T(F): 97.3 (14 Jan 2022 05:01), Max: 97.9 (13 Jan 2022 12:39)  HR: 70 (14 Jan 2022 05:01) (61 - 70)  BP: 162/74 (14 Jan 2022 05:01) (136/77 - 169/91)  BP(mean): --  ABP: --  ABP(mean): --  RR: 19 (14 Jan 2022 05:01) (18 - 19)  SpO2: 93% (14 Jan 2022 05:01) (93% - 97%)           Input and Output:  I&O's Detail    12 Jan 2022 07:01  -  13 Jan 2022 07:00  --------------------------------------------------------  IN:  Total IN: 0 mL    OUT:    Voided (mL): 250 mL  Total OUT: 250 mL    Total NET: -250 mL      13 Jan 2022 07:01  -  14 Jan 2022 05:24  --------------------------------------------------------  IN:    Oral Fluid: 940 mL  Total IN: 940 mL    OUT:    Voided (mL): 450 mL  Total OUT: 450 mL    Total NET: 490 mL          Lab Data                        9.1    10.27 )-----------( 296      ( 13 Jan 2022 06:58 )             29.4     01-13    139  |  103  |  45<H>  ----------------------------<  142<H>  3.9   |  29  |  2.20<H>    Ca    8.4<L>      13 Jan 2022 06:58  Mg     2.3     01-13              Review of Systems	      Objective     Physical Examination    heart s1s2  lung dec BS  abd soft  head nc      Pertinent Lab findings & Imaging      Nikko:  NO   Adequate UO     I&O's Detail    12 Jan 2022 07:01  -  13 Jan 2022 07:00  --------------------------------------------------------  IN:  Total IN: 0 mL    OUT:    Voided (mL): 250 mL  Total OUT: 250 mL    Total NET: -250 mL      13 Jan 2022 07:01  -  14 Jan 2022 05:24  --------------------------------------------------------  IN:    Oral Fluid: 940 mL  Total IN: 940 mL    OUT:    Voided (mL): 450 mL  Total OUT: 450 mL    Total NET: 490 mL               Discussed with:     Cultures:	        Radiology

## 2022-01-14 NOTE — DISCHARGE NOTE PROVIDER - CARE PROVIDER_API CALL
Bello Sheridan)  Cardio [facility]  [address] [place] [address]  Phone: [phone]  Fax: [phone]  Follow Up Time: 1 week    Camilo Day)  Critical Care Medicine; HospicePalliative Medicine; Internal Medicine; Pulmonary Disease  [address] [place] [address]  Phone: [phone]  Fax: [phone]  Follow Up Time: 2 weeks

## 2022-01-14 NOTE — DISCHARGE NOTE PROVIDER - NSDCCPCAREPLAN_GEN_ALL_CORE_FT
PRINCIPAL DISCHARGE DIAGNOSIS  Diagnosis: Acute respiratory failure with hypoxia  Assessment and Plan of Treatment: secondary to covid      SECONDARY DISCHARGE DIAGNOSES  Diagnosis: Elevated troponin  Assessment and Plan of Treatment:     Diagnosis: Chronic diastolic congestive heart failure  Assessment and Plan of Treatment:     Diagnosis: Hypertensive urgency  Assessment and Plan of Treatment:     Diagnosis: Stage 3 chronic kidney disease  Assessment and Plan of Treatment:     Diagnosis: Anemia secondary to renal failure  Assessment and Plan of Treatment:     Diagnosis: Chronic atrial fibrillation  Assessment and Plan of Treatment:     Diagnosis: Anxiety and depression  Assessment and Plan of Treatment:

## 2022-01-14 NOTE — DISCHARGE NOTE PROVIDER - PROVIDER TOKENS
PROVIDER:[TOKEN:[[id number]],FOLLOWUP:[1 week]],PROVIDER:[TOKEN:[[id number]],FOLLOWUP:[2 weeks]]

## 2022-01-14 NOTE — PROGRESS NOTE ADULT - ASSESSMENT
77 yo M PMH of A fib (on Eliquis) , MGUS s/p PRBC transfusion 10/21, anxiety/depression, CAD s/p stents (not on plavix), CHF, Diverticulosis, HLD, HTN, thyroid nodules, CKD stage 3, DM presents to the ED with SOB    Swallow study noted  Covid positive - Jan 12  on RA  on LASIX  completed ABX  DC planning - home -   vs noted  labs reviewed    Previous echo 10/21 showed: Normal LV size with normal LV systolic function with estimated LVEF 55-60%. LV diastolic function cannot determine due to atrial fibrillation  Obesity - exam and risk factors - and features - c/w ELMER - outpatient eval  AF - on AC -   D dimer noted - serial D dimer  cvs rx regimen - diuresis - cxr and proBNP c/w Vol Overload - Cardio eval in progress - old records and TTE reviewed - I and O, serial CE -   dietary discretion  Covid - in vaccinated pt - monitor VS and Sat - Acap and Robitussin PRN - monitor VS and HD and Sat - Decadron is indicated in covid with hypoxemia  pt is on Home Rx regimen of Symbicort - unclear indication - ex smoker - no history of COPD as per pt or medical record - prior CT without Emphysema  isolation for covid -

## 2022-01-14 NOTE — PROGRESS NOTE ADULT - SUBJECTIVE AND OBJECTIVE BOX
[facility] -- Fifi Bliss, Kalpesh Valdovinos, Abraham Sheridan Savella, Goodger  Office # [phone]    Follow Up: CAD Volume overload, Hx of CAD, Afib    Subjective/Observations: Sleeping but arousable.  Tolerating RA.  No complaints at this time    REVIEW OF SYSTEMS: All other review of systems is negative unless indicated above  PAST MEDICAL & SURGICAL HISTORY:  HTN (hypertension)  c/b multiple episodes of hypertensive urgency    HLD (hyperlipidemia)    Atrial fibrillation  first documented on EKG 10/7/2021    CAD (coronary artery disease)  s/p stents (not on plavix)    Type 2 diabetes mellitus  not on home insulin/ Meds    Anxiety  Depression  multiple psych medications    History of diverticulitis  07/2021    Diverticulosis  c/b GIB in 2020    Afib  on AC    Stage 3 chronic kidney disease    Anemia of chronic disease  Monoclonal Gammopathy-MGUS  pRBC transfusion 10/15/21    Chronic diastolic congestive heart failure    Multiple thyroid nodules    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  Had surgery ( April 2013 )    S/P tonsillectomy    S/P arthroscopic knee surgery  Bilateral ( 2005 )    Torsion of testicle  Had surgery at age 14    Pilonidal cyst  Had surgery ( 1969 )    S/P cataract surgery  Bilateral    H/O hernia repair    MEDICATIONS  (STANDING):  amLODIPine   Tablet 10 milliGRAM(s) Oral daily  apixaban 5 milliGRAM(s) Oral two times a day  ARIPiprazole 30 milliGRAM(s) Oral daily  aspirin enteric coated 81 milliGRAM(s) Oral daily  atorvastatin 10 milliGRAM(s) Oral at bedtime  budesonide 160 MICROgram(s)/formoterol 4.5 MICROgram(s) Inhaler 2 Puff(s) Inhalation two times a day  carvedilol 12.5 milliGRAM(s) Oral every 12 hours  cloNIDine 0.3 milliGRAM(s) Oral two times a day  cyanocobalamin 1000 MICROGram(s) Oral daily  dexAMETHasone     Tablet 6 milliGRAM(s) Oral daily  dextrose 40% Gel 15 Gram(s) Oral once  dextrose 5%. 1000 milliLiter(s) (50 mL/Hr) IV Continuous <Continuous>  dextrose 5%. 1000 milliLiter(s) (100 mL/Hr) IV Continuous <Continuous>  dextrose 50% Injectable 12.5 Gram(s) IV Push once  dextrose 50% Injectable 25 Gram(s) IV Push once  dextrose 50% Injectable 25 Gram(s) IV Push once  famotidine    Tablet 20 milliGRAM(s) Oral daily  folic acid 1 milliGRAM(s) Oral daily  furosemide    Tablet 40 milliGRAM(s) Oral two times a day  glucagon  Injectable 1 milliGRAM(s) IntraMuscular once  hydrALAZINE 100 milliGRAM(s) Oral three times a day  insulin lispro (ADMELOG) corrective regimen sliding scale   SubCutaneous three times a day before meals  insulin lispro (ADMELOG) corrective regimen sliding scale   SubCutaneous at bedtime  isosorbide   mononitrate ER Tablet (IMDUR) 120 milliGRAM(s) Oral daily  lamoTRIgine 100 milliGRAM(s) Oral daily  mirtazapine 30 milliGRAM(s) Oral at bedtime  oxybutynin 5 milliGRAM(s) Oral two times a day  pantoprazole    Tablet 40 milliGRAM(s) Oral before breakfast  senna 2 Tablet(s) Oral at bedtime  sodium chloride 0.65% Nasal 1 Spray(s) Both Nostrils four times a day  venlafaxine XR. 150 milliGRAM(s) Oral daily    MEDICATIONS  (PRN):  acetaminophen     Tablet .. 650 milliGRAM(s) Oral every 6 hours PRN Temp greater or equal to 38.5C (101.3F), Moderate Pain (4 - 6)  ALBUTerol    90 MICROgram(s) HFA Inhaler 2 Puff(s) Inhalation every 6 hours PRN Shortness of Breath and/or Wheezing    Allergies    No Known Allergies    Intolerances    Vital Signs Last 24 Hrs  T(C): 36.3 (14 Jan 2022 05:01), Max: 36.6 (13 Jan 2022 12:39)  T(F): 97.3 (14 Jan 2022 05:01), Max: 97.9 (13 Jan 2022 12:39)  HR: 70 (14 Jan 2022 05:01) (61 - 70)  BP: 162/74 (14 Jan 2022 05:01) (136/77 - 162/74)  BP(mean): --  RR: 19 (14 Jan 2022 05:01) (18 - 19)  SpO2: 93% (14 Jan 2022 05:01) (93% - 97%)  I&O's Summary    13 Jan 2022 07:01  -  14 Jan 2022 07:00  --------------------------------------------------------  IN: 940 mL / OUT: 450 mL / NET: 490 mL    PHYSICAL EXAM:  TELE: Not on tele  Constitutional: NAD, asleep but arousable, obese  HEENT: Moist Mucous Membranes, Anicteric  Pulmonary: Non-labored, breath sounds are clear but diminished bilaterally, No wheezing, rales or rhonchi  Cardiovascular: IRRR, S1 and S2, No murmurs, rubs, gallops or clicks  Gastrointestinal: Bowel Sounds present, soft, nontender.   Lymph: No peripheral edema. No lymphadenopathy.  Skin: No visible rashes or ulcers.  Psych:  Mood & affect appropriate  LABS: All Labs Reviewed:                        8.9    10.67 )-----------( 267      ( 14 Jan 2022 06:25 )             29.0                         9.1    10.27 )-----------( 296      ( 13 Jan 2022 06:58 )             29.4                         9.9    11.52 )-----------( 332      ( 12 Jan 2022 07:45 )             31.8     14 Jan 2022 06:25    139    |  103    |  49     ----------------------------<  198    3.6     |  30     |  2.50   13 Jan 2022 06:58    139    |  103    |  45     ----------------------------<  142    3.9     |  29     |  2.20   12 Jan 2022 07:45    140    |  101    |  44     ----------------------------<  140    3.4     |  29     |  2.30     Ca    8.4        14 Jan 2022 06:25  Ca    8.4        13 Jan 2022 06:58  Ca    8.6        12 Jan 2022 07:45  Mg     2.3       13 Jan 2022 06:58       EXAM:  ECHO TTE WO CON COMP W DOPP         PROCEDURE DATE:  01/06/2022        INTERPRETATION:  INDICATION: Dyspnea  Sonographer PH    Blood Pressure 177/86    Height 172.7 cm     Weight 104.4 kg    Dimensions:  LA 4.2       Normal Values: 2.0 - 4.0 cm  Ao 3.7        Normal Values: 2.0 - 3.8 cm  SEPTUM 1.4       Normal Values: 0.6 - 1.2 cm  PWT 1.4       Normal Values: 0.6 - 1.1 cm  LVIDd 5.2         Normal Values: 3.0 - 5.6 cm  LVIDs 4.2         Normal Values: 1.8 - 4.0 cm      OBSERVATIONS:  Technically difficult study  Mitral Valve: Mitral annular calcification with thickened leaflets, mild   MR.  Aortic Valve/Aorta: Calcified trileaflet aortic valve with decreased   opening. Peak transaortic valve gradient is 29.4 mmHg with a mean   transaortic valve gradient 14.7 mmHg. This consistent with mild aortic   stenosis.  Tricuspid Valve: Mild TR.  Pulmonic Valve: Not well-visualized  Left Atrium: Enlarged  Right Atrium: Not well-visualized  Left Ventricle: Moderate left ventricular hypertrophy with normal   systolic function, estimated LVEF of 55%.  Right Ventricle: Grossly normal size and systolic function.  Pericardium: no significant pericardial effusion.  Pulmonary/RV Pressure: estimated PA systolic pressure of 32mmHg    IMPRESSION:  Technically difficult study  Moderate left ventricular hypertrophy with normal systolic function,   estimated LVEF of 55%.  Grossly normal RV size and systolic function.  Calcified trileaflet aortic valve with mild aortic stenosis  Mild MR andTR.  No significant pericardial effusion.    --- End of Report ---      VIET GREENWOOD MD; Attending Cardiologist  This document has been electronically signed. Jan 7 2022 11:19AM      ACC: [id number] EXAM:  XR CHEST PORTABLE IMMED 1V                          PROCEDURE DATE:  01/05/2022          INTERPRETATION:  DATE OF STUDY: 1/5/22    PRIOR:12/24/21    CLINICAL INDICATION: New fever.    TECHNIQUE: portable chest.    FINDINGS:  There is stable cardiomegaly.  The left lung is clear.  Partial clearing of previously noted lower right lung airspace haziness.  New airspace opacity in mid-right lung just above right minor fissure.   New airspace opacity in upper right lung -these findings are concerning   for pneumonia right clinical setting.  No sizable pleural effusion. No pneumothorax.  Degenerative changes of the thoracic spine.    IMPRESSION:  Clear left lung.  New probable pneumonic infiltrates in mid and upper right lung.    --- End of Report ---    JOHN MARADIAGA MD; Attending Radiologist  This document has been electronically signed. Jan 5 2022  9:44AM    Ventricular Rate 94 BPM    Atrial Rate 91 BPM    QRS Duration 100 ms    Q-T Interval 378 ms    QTC Calculation(Bazett) 472 ms    R Axis -15 degrees    T Axis 261 degrees    Diagnosis Line Atrial fibrillation  Incomplete right bundle branch block  Moderate voltage criteria for LVH, may be normal variant  Nonspecific T wave abnormality  Prolonged QT  Abnormal ECG  Confirmed by Bonifacio BARRERA, Victorino ([id number]) on 1/3/2022 3:17:52 PM

## 2022-01-14 NOTE — PROGRESS NOTE ADULT - SUBJECTIVE AND OBJECTIVE BOX
Patient is a 76y old  Male who presents with a chief complaint of CHF exacerbation (05 Jan 2022 12:02)    Patient seen in follow up for CKD 4.        PAST MEDICAL HISTORY:  Hypertension    Diabetes mellitus    Lupus    Hepatitis C    Anxiety and depression    CAD (coronary artery disease)    Diverticulosis    Hyperlipidemia    HTN (hypertension)    HLD (hyperlipidemia)    Atrial fibrillation    CAD (coronary artery disease)    Type 2 diabetes mellitus    Anxiety    History of diverticulitis    Diverticulosis    Afib    Stage 3 chronic kidney disease    Anemia of chronic disease    Chronic diastolic congestive heart failure    Multiple thyroid nodules      MEDICATIONS  (STANDING):  amLODIPine   Tablet 10 milliGRAM(s) Oral daily  apixaban 5 milliGRAM(s) Oral two times a day  ARIPiprazole 30 milliGRAM(s) Oral daily  aspirin enteric coated 81 milliGRAM(s) Oral daily  atorvastatin 10 milliGRAM(s) Oral at bedtime  budesonide 160 MICROgram(s)/formoterol 4.5 MICROgram(s) Inhaler 2 Puff(s) Inhalation two times a day  carvedilol 12.5 milliGRAM(s) Oral every 12 hours  cloNIDine 0.3 milliGRAM(s) Oral two times a day  cyanocobalamin 1000 MICROGram(s) Oral daily  dexAMETHasone     Tablet 6 milliGRAM(s) Oral daily  dextrose 40% Gel 15 Gram(s) Oral once  dextrose 5%. 1000 milliLiter(s) (50 mL/Hr) IV Continuous <Continuous>  dextrose 5%. 1000 milliLiter(s) (100 mL/Hr) IV Continuous <Continuous>  dextrose 50% Injectable 12.5 Gram(s) IV Push once  dextrose 50% Injectable 25 Gram(s) IV Push once  dextrose 50% Injectable 25 Gram(s) IV Push once  epoetin michael-epbx (RETACRIT) Injectable 63504 Unit(s) SubCutaneous <User Schedule>  famotidine    Tablet 20 milliGRAM(s) Oral daily  folic acid 1 milliGRAM(s) Oral daily  glucagon  Injectable 1 milliGRAM(s) IntraMuscular once  hydrALAZINE 100 milliGRAM(s) Oral three times a day  insulin lispro (ADMELOG) corrective regimen sliding scale   SubCutaneous at bedtime  insulin lispro (ADMELOG) corrective regimen sliding scale   SubCutaneous three times a day before meals  isosorbide   mononitrate ER Tablet (IMDUR) 120 milliGRAM(s) Oral daily  lamoTRIgine 100 milliGRAM(s) Oral daily  mirtazapine 30 milliGRAM(s) Oral at bedtime  oxybutynin 5 milliGRAM(s) Oral two times a day  pantoprazole    Tablet 40 milliGRAM(s) Oral before breakfast  senna 2 Tablet(s) Oral at bedtime  sodium chloride 0.65% Nasal 1 Spray(s) Both Nostrils four times a day  venlafaxine XR. 150 milliGRAM(s) Oral daily    MEDICATIONS  (PRN):  acetaminophen     Tablet .. 650 milliGRAM(s) Oral every 6 hours PRN Temp greater or equal to 38.5C (101.3F), Moderate Pain (4 - 6)  ALBUTerol    90 MICROgram(s) HFA Inhaler 2 Puff(s) Inhalation every 6 hours PRN Shortness of Breath and/or Wheezing    T(C): 36.2 (01-14-22 @ 12:24), Max: 36.8 (01-12-22 @ 20:35)  HR: 72 (01-14-22 @ 12:24) (61 - 72)  BP: 132/78 (01-14-22 @ 12:24) (132/78 - 169/91)  RR: 16 (01-14-22 @ 12:24)  SpO2: 96% (01-14-22 @ 12:24)  Wt(kg): --  I&O's Detail    13 Jan 2022 07:01  -  14 Jan 2022 07:00  --------------------------------------------------------  IN:    Oral Fluid: 940 mL  Total IN: 940 mL    OUT:    Voided (mL): 450 mL  Total OUT: 450 mL    Total NET: 490 mL              PHYSICAL EXAM:  General: No distress  Respiratory: b/l air entry  Cardiovascular: S1 S2  Gastrointestinal: soft  Extremities:  + edema                             LABORATORY:                        8.9    10.67 )-----------( 267      ( 14 Jan 2022 06:25 )             29.0     01-14    139  |  103  |  49<H>  ----------------------------<  198<H>  3.6   |  30  |  2.50<H>    Ca    8.4<L>      14 Jan 2022 06:25  Mg     2.3     01-13      Sodium, Serum: 139 mmol/L (01-14 @ 06:25)  Sodium, Serum: 139 mmol/L (01-13 @ 06:58)    Potassium, Serum: 3.6 mmol/L (01-14 @ 06:25)  Potassium, Serum: 3.9 mmol/L (01-13 @ 06:58)    Hemoglobin: 8.9 g/dL (01-14 @ 06:25)  Hemoglobin: 9.1 g/dL (01-13 @ 06:58)  Hemoglobin: 9.9 g/dL (01-12 @ 07:45)    Creatinine, Serum 2.50 (01-14 @ 06:25)  Creatinine, Serum 2.20 (01-13 @ 06:58)  Creatinine, Serum 2.30 (01-12 @ 07:45)

## 2022-01-15 LAB
ANION GAP SERPL CALC-SCNC: 8 MMOL/L — SIGNIFICANT CHANGE UP (ref 5–17)
BASOPHILS # BLD AUTO: 0.01 K/UL — SIGNIFICANT CHANGE UP (ref 0–0.2)
BASOPHILS NFR BLD AUTO: 0.1 % — SIGNIFICANT CHANGE UP (ref 0–2)
BUN SERPL-MCNC: 40 MG/DL — HIGH (ref 7–23)
CALCIUM SERPL-MCNC: 8 MG/DL — LOW (ref 8.5–10.1)
CHLORIDE SERPL-SCNC: 102 MMOL/L — SIGNIFICANT CHANGE UP (ref 96–108)
CO2 SERPL-SCNC: 28 MMOL/L — SIGNIFICANT CHANGE UP (ref 22–31)
CREAT SERPL-MCNC: 2.3 MG/DL — HIGH (ref 0.5–1.3)
EOSINOPHIL # BLD AUTO: 0.13 K/UL — SIGNIFICANT CHANGE UP (ref 0–0.5)
EOSINOPHIL NFR BLD AUTO: 1.2 % — SIGNIFICANT CHANGE UP (ref 0–6)
GLUCOSE SERPL-MCNC: 195 MG/DL — HIGH (ref 70–99)
HCT VFR BLD CALC: 31 % — LOW (ref 39–50)
HGB BLD-MCNC: 9.7 G/DL — LOW (ref 13–17)
IMM GRANULOCYTES NFR BLD AUTO: 0.5 % — SIGNIFICANT CHANGE UP (ref 0–1.5)
LYMPHOCYTES # BLD AUTO: 1.37 K/UL — SIGNIFICANT CHANGE UP (ref 1–3.3)
LYMPHOCYTES # BLD AUTO: 12.4 % — LOW (ref 13–44)
MAGNESIUM SERPL-MCNC: 2.6 MG/DL — SIGNIFICANT CHANGE UP (ref 1.6–2.6)
MCHC RBC-ENTMCNC: 27.6 PG — SIGNIFICANT CHANGE UP (ref 27–34)
MCHC RBC-ENTMCNC: 31.3 GM/DL — LOW (ref 32–36)
MCV RBC AUTO: 88.1 FL — SIGNIFICANT CHANGE UP (ref 80–100)
MONOCYTES # BLD AUTO: 0.68 K/UL — SIGNIFICANT CHANGE UP (ref 0–0.9)
MONOCYTES NFR BLD AUTO: 6.2 % — SIGNIFICANT CHANGE UP (ref 2–14)
NEUTROPHILS # BLD AUTO: 8.77 K/UL — HIGH (ref 1.8–7.4)
NEUTROPHILS NFR BLD AUTO: 79.6 % — HIGH (ref 43–77)
NRBC # BLD: 0 /100 WBCS — SIGNIFICANT CHANGE UP (ref 0–0)
PLATELET # BLD AUTO: 247 K/UL — SIGNIFICANT CHANGE UP (ref 150–400)
POTASSIUM SERPL-MCNC: 4.2 MMOL/L — SIGNIFICANT CHANGE UP (ref 3.5–5.3)
POTASSIUM SERPL-SCNC: 4.2 MMOL/L — SIGNIFICANT CHANGE UP (ref 3.5–5.3)
RBC # BLD: 3.52 M/UL — LOW (ref 4.2–5.8)
RBC # FLD: 18.5 % — HIGH (ref 10.3–14.5)
SARS-COV-2 RNA SPEC QL NAA+PROBE: DETECTED
SODIUM SERPL-SCNC: 138 MMOL/L — SIGNIFICANT CHANGE UP (ref 135–145)
WBC # BLD: 11.02 K/UL — HIGH (ref 3.8–10.5)
WBC # FLD AUTO: 11.02 K/UL — HIGH (ref 3.8–10.5)

## 2022-01-15 PROCEDURE — 99232 SBSQ HOSP IP/OBS MODERATE 35: CPT

## 2022-01-15 RX ADMIN — Medication 81 MILLIGRAM(S): at 11:11

## 2022-01-15 RX ADMIN — PREGABALIN 1000 MICROGRAM(S): 225 CAPSULE ORAL at 11:11

## 2022-01-15 RX ADMIN — BUDESONIDE AND FORMOTEROL FUMARATE DIHYDRATE 2 PUFF(S): 160; 4.5 AEROSOL RESPIRATORY (INHALATION) at 05:36

## 2022-01-15 RX ADMIN — Medication 0.5 MILLIGRAM(S): at 10:44

## 2022-01-15 RX ADMIN — Medication 0.3 MILLIGRAM(S): at 05:36

## 2022-01-15 RX ADMIN — AMLODIPINE BESYLATE 10 MILLIGRAM(S): 2.5 TABLET ORAL at 05:35

## 2022-01-15 RX ADMIN — CARVEDILOL PHOSPHATE 12.5 MILLIGRAM(S): 80 CAPSULE, EXTENDED RELEASE ORAL at 05:35

## 2022-01-15 RX ADMIN — Medication 0.3 MILLIGRAM(S): at 17:13

## 2022-01-15 RX ADMIN — LAMOTRIGINE 100 MILLIGRAM(S): 25 TABLET, ORALLY DISINTEGRATING ORAL at 11:11

## 2022-01-15 RX ADMIN — APIXABAN 5 MILLIGRAM(S): 2.5 TABLET, FILM COATED ORAL at 05:36

## 2022-01-15 RX ADMIN — SENNA PLUS 2 TABLET(S): 8.6 TABLET ORAL at 21:51

## 2022-01-15 RX ADMIN — BUDESONIDE AND FORMOTEROL FUMARATE DIHYDRATE 2 PUFF(S): 160; 4.5 AEROSOL RESPIRATORY (INHALATION) at 17:13

## 2022-01-15 RX ADMIN — Medication 100 MILLIGRAM(S): at 05:35

## 2022-01-15 RX ADMIN — FAMOTIDINE 20 MILLIGRAM(S): 10 INJECTION INTRAVENOUS at 11:11

## 2022-01-15 RX ADMIN — ISOSORBIDE MONONITRATE 120 MILLIGRAM(S): 60 TABLET, EXTENDED RELEASE ORAL at 11:11

## 2022-01-15 RX ADMIN — Medication 100 MILLIGRAM(S): at 13:19

## 2022-01-15 RX ADMIN — PANTOPRAZOLE SODIUM 40 MILLIGRAM(S): 20 TABLET, DELAYED RELEASE ORAL at 05:35

## 2022-01-15 RX ADMIN — Medication 5 MILLIGRAM(S): at 05:36

## 2022-01-15 RX ADMIN — Medication 1 SPRAY(S): at 05:36

## 2022-01-15 RX ADMIN — ATORVASTATIN CALCIUM 10 MILLIGRAM(S): 80 TABLET, FILM COATED ORAL at 21:51

## 2022-01-15 RX ADMIN — ARIPIPRAZOLE 30 MILLIGRAM(S): 15 TABLET ORAL at 11:11

## 2022-01-15 RX ADMIN — APIXABAN 5 MILLIGRAM(S): 2.5 TABLET, FILM COATED ORAL at 17:13

## 2022-01-15 RX ADMIN — Medication 1 MILLIGRAM(S): at 11:11

## 2022-01-15 RX ADMIN — Medication 5 MILLIGRAM(S): at 17:13

## 2022-01-15 RX ADMIN — CARVEDILOL PHOSPHATE 12.5 MILLIGRAM(S): 80 CAPSULE, EXTENDED RELEASE ORAL at 17:13

## 2022-01-15 RX ADMIN — Medication 150 MILLIGRAM(S): at 11:11

## 2022-01-15 RX ADMIN — MIRTAZAPINE 30 MILLIGRAM(S): 45 TABLET, ORALLY DISINTEGRATING ORAL at 21:51

## 2022-01-15 RX ADMIN — Medication 100 MILLIGRAM(S): at 21:51

## 2022-01-15 NOTE — PROGRESS NOTE ADULT - SUBJECTIVE AND OBJECTIVE BOX
CHIEF COMPLAINT/INTERVAL HISTORY:  Pt. seen and evaluated for acute hypoxic respiratory failure 2/2 COVID pneumonia.  Pt. is in no distress.  On room air with SpO2 in 90th%.  Denies feeling SOB.  Tolerating decadron.     REVIEW OF SYSTEMS:  No fever, CP, SOB, or abdominal pain    Vital Signs Last 24 Hrs  T(C): 36.5 (15 [place] 2022 11:05), Max: 36.7 (14 Jan 2022 19:46)  T(F): 97.7 (15 [place]co 2022 11:05), Max: 98.1 (14 Jan 2022 19:46)  HR: 53 (15 [place] 2022 11:05) (53 - 72)  BP: 174/98 (15 [place]co 2022 11:05) (132/73 - 174/98)  BP(mean): --  RR: 18 (15 [place]co 2022 11:05) (16 - 18)  SpO2: 98% (15 [place]co 2022 11:05) (94% - 98%)    PHYSICAL EXAM:  GENERAL: NAD  HEENT: EOMI, hearing normal, conjunctiva and sclera clear  Chest: Diminished BS at bases, no wheezing  CV: S1S2, RRR,   GI: soft, +BS, NT/ND  Musculoskeletal: no edema  Psychiatric: affect nL, mood nL  Skin: warm and dry    LABS:                        9.7    11.02 )-----------( 247      ( 15 [place]co 2022 08:37 )             31.0     01-15    138  |  102  |  40<H>  ----------------------------<  195<H>  4.2   |  28  |  2.30<H>    Ca    8.0<L>      15 [place] 2022 08:37  Mg     2.6     01-15      Assessment and Plan:  77yo M from group home, with PMH of DM2, HTN, HLD, CAD (s/p stents), Afib (on Eliquis), stage 3 CKD, hx of MGUS; a/w acute hypoxic and hypercarbic respiratory failure due to COVID-19 PNA, and acute on chronic diastolic CHF.    Acute hypoxic and hypercarbic respiratory failure due to COVID19 PNA and acute CHF:  -blood gas improved significantly   -completed remdesivir 5-day course  -completing course of dexamethasone 6mg daily   -has been on NC - now tolerated RA at rest  -airborne/contact precautions  -Pulm/ID following ([facility]), recs appreciated      Acute on chronic diastolic CHF:  - has been on lasix 40mg IV BID; strict I/Os; monitoring hemodynamics/renal function w/diuresis  - transitioned to lasix 40mg PO BID.  Now off diuretics per cardiology due to ariella    - monitor O2, daily weights   - cardio ([facility]), recs appreciated    Hypertensive urgency:  - continue coreg, clonidine, norvasc, hydralazine  - BP control improving after increasing clonidine to 0.3mg po BID and increasing coreg to 12.5mg po BID   - cardio ([facility]), recs appreciated  - monitor BP    Acute epistaxis:  - likely was mucosal bleed from drying O2 and bled longer due to Eliquis, ASA, and elevated BUN  - bleeding controlled with Afrin/lidocaine soaked cotton with pressure for 5 minutes per ENT recs  - ENT ([facility]), recs appreciated  - Ocean spray as needed    ARIELLA on CKD 3:  - baseline Cr appears to be ~2.0 - renally dose meds, no nephrotoxics  - Cr trended up to 2.5, now off lasix with improving renal indices  - nephro ([facility]), recs appreciated    Anemia:  - pt with hx of MGUS and CKD likely contributing to anemia, along with current acute illness  - Hgb had trended down to 7.5, now improving and >8  - no gross hematuria, hematochezia, melena; monitor for signs of blood loss  - discussed blood transfusion with the pt who is hesitant as he is feeling overall better  - Retacrit per nephro orders for the renal failure component of anemia   - found to be iron deficient, s/p venofer 100mg IV q24h x3 doses  - goal Hgb > 8    Afib:  - c/w Eliquis  - c/w coreg  - cardio ([facility]), recs appreciated    Type 2 DM:  - goal glucose 100-180  - monitor FSG   - c/w HISS    psych - continue abilify, lamictal, effexor    Dysphagia:  s/p Modified barium swallow study and now on Consistent carb/DASH diet with mildly thick liquids.  Pt. has been having intermittent episodes of feeling food getting stuck and N/V.  He was offered GI consultation/evaluation but initially  declined.  Now accepting of GI consultation.     DVT prophy   - c/w Eliquis    Disp: per SW, pt's group home cannot accept him if he is COVID positive

## 2022-01-15 NOTE — PROGRESS NOTE ADULT - SUBJECTIVE AND OBJECTIVE BOX
[facility] - Fifi Bliss, Bonifacio, Kalpesh, Fatimah, Loc Rosario  Office Number:  [phone]    Patient resting comfortably in bed in NAD.  Laying flat with no respiratory distress.  No complaints of chest pain, dyspnea, palpitations, PND, or orthopnea.    F/U for:  Hypoxic resp failure         MEDICATIONS  (STANDING):  amLODIPine   Tablet 10 milliGRAM(s) Oral daily  apixaban 5 milliGRAM(s) Oral two times a day  ARIPiprazole 30 milliGRAM(s) Oral daily  aspirin enteric coated 81 milliGRAM(s) Oral daily  atorvastatin 10 milliGRAM(s) Oral at bedtime  budesonide 160 MICROgram(s)/formoterol 4.5 MICROgram(s) Inhaler 2 Puff(s) Inhalation two times a day  carvedilol 12.5 milliGRAM(s) Oral every 12 hours  cloNIDine 0.3 milliGRAM(s) Oral two times a day  cyanocobalamin 1000 MICROGram(s) Oral daily  dexAMETHasone     Tablet 6 milliGRAM(s) Oral daily  dextrose 40% Gel 15 Gram(s) Oral once  dextrose 5%. 1000 milliLiter(s) (50 mL/Hr) IV Continuous <Continuous>  dextrose 5%. 1000 milliLiter(s) (100 mL/Hr) IV Continuous <Continuous>  dextrose 50% Injectable 25 Gram(s) IV Push once  dextrose 50% Injectable 12.5 Gram(s) IV Push once  dextrose 50% Injectable 25 Gram(s) IV Push once  epoetin michael-epbx (RETACRIT) Injectable 00445 Unit(s) SubCutaneous <User Schedule>  famotidine    Tablet 20 milliGRAM(s) Oral daily  folic acid 1 milliGRAM(s) Oral daily  glucagon  Injectable 1 milliGRAM(s) IntraMuscular once  hydrALAZINE 100 milliGRAM(s) Oral three times a day  insulin lispro (ADMELOG) corrective regimen sliding scale   SubCutaneous at bedtime  insulin lispro (ADMELOG) corrective regimen sliding scale   SubCutaneous three times a day before meals  isosorbide   mononitrate ER Tablet (IMDUR) 120 milliGRAM(s) Oral daily  lamoTRIgine 100 milliGRAM(s) Oral daily  mirtazapine 30 milliGRAM(s) Oral at bedtime  oxybutynin 5 milliGRAM(s) Oral two times a day  pantoprazole    Tablet 40 milliGRAM(s) Oral before breakfast  senna 2 Tablet(s) Oral at bedtime  sodium chloride 0.65% Nasal 1 Spray(s) Both Nostrils four times a day  venlafaxine XR. 150 milliGRAM(s) Oral daily    MEDICATIONS  (PRN):  acetaminophen     Tablet .. 650 milliGRAM(s) Oral every 6 hours PRN Temp greater or equal to 38.5C (101.3F), Moderate Pain (4 - 6)  ALBUTerol    90 MICROgram(s) HFA Inhaler 2 Puff(s) Inhalation every 6 hours PRN Shortness of Breath and/or Wheezing  LORazepam     Tablet 0.5 milliGRAM(s) Oral daily PRN Anxiety      Allergies    No Known Allergies    Intolerances        Vital Signs Last 24 Hrs  T(C): 36.5 (15 [place] 2022 11:05), Max: 36.7 (14 Jan 2022 19:46)  T(F): 97.7 (15 [place]co 2022 11:05), Max: 98.1 (14 Jan 2022 19:46)  HR: 53 (15 [place] 2022 11:05) (53 - 72)  BP: 174/98 (15 [place] 2022 11:05) (132/73 - 174/98)  BP(mean): --  RR: 18 (15 [place] 2022 11:05) (16 - 18)  SpO2: 98% (15 [place] 2022 11:05) (94% - 98%)    I&O's Summary      ON EXAM:    PE deferred secondary to COVID    LABS: All Labs Reviewed:                        9.7    11.02 )-----------( 247      ( 15 Checo 2022 08:37 )             31.0                         8.9    10.67 )-----------( 267      ( 14 Jan 2022 06:25 )             29.0                         9.1    10.27 )-----------( 296      ( 13 Jan 2022 06:58 )             29.4     15 [place] 2022 08:37    138    |  102    |  40     ----------------------------<  195    4.2     |  28     |  2.30   14 Jan 2022 06:25    139    |  103    |  49     ----------------------------<  198    3.6     |  30     |  2.50   13 Jan 2022 06:58    139    |  103    |  45     ----------------------------<  142    3.9     |  29     |  2.20     Ca    8.0        15 [place] 2022 08:37  Ca    8.4        14 Jan 2022 06:25  Ca    8.4        13 Jan 2022 06:58  Mg     2.6       15 [place] 2022 08:37  Mg     2.3       13 Jan 2022 06:58

## 2022-01-15 NOTE — PROGRESS NOTE ADULT - SUBJECTIVE AND OBJECTIVE BOX
Date/Time Patient Seen:  		  Referring MD:   Data Reviewed	       Patient is a 76y old  Male who presents with a chief complaint of CHF exacerbation (14 Jan 2022 16:31)      Subjective/HPI     PAST MEDICAL & SURGICAL HISTORY:  Hypertension    Diabetes mellitus    Lupus    Hepatitis C    Anxiety and depression    CAD (coronary artery disease)  s/p stents    Diverticulosis    Hyperlipidemia    HTN (hypertension)  c/b multiple episodes of hypertensive urgency    HLD (hyperlipidemia)    Atrial fibrillation  first documented on EKG 10/7/2021    CAD (coronary artery disease)  s/p stents (not on plavix)    Type 2 diabetes mellitus  not on home insulin/ Meds    Anxiety  Depression  multiple psych medications    History of diverticulitis  07/2021    Diverticulosis  c/b GIB in 2020    Afib  on AC    Stage 3 chronic kidney disease    Anemia of chronic disease  Monoclonal Gammopathy-MGUS  pRBC transfusion 10/15/21    Chronic diastolic congestive heart failure    Multiple thyroid nodules    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  Had surgery ( April 2013 )    S/P tonsillectomy    S/P arthroscopic knee surgery  Bilateral ( 2005 )    Torsion of testicle  Had surgery at age 14    Pilonidal cyst  Had surgery ( 1969 )    S/P cataract surgery  Bilateral    H/O hernia repair          Medication list         MEDICATIONS  (STANDING):  amLODIPine   Tablet 10 milliGRAM(s) Oral daily  apixaban 5 milliGRAM(s) Oral two times a day  ARIPiprazole 30 milliGRAM(s) Oral daily  aspirin enteric coated 81 milliGRAM(s) Oral daily  atorvastatin 10 milliGRAM(s) Oral at bedtime  budesonide 160 MICROgram(s)/formoterol 4.5 MICROgram(s) Inhaler 2 Puff(s) Inhalation two times a day  carvedilol 12.5 milliGRAM(s) Oral every 12 hours  cloNIDine 0.3 milliGRAM(s) Oral two times a day  cyanocobalamin 1000 MICROGram(s) Oral daily  dexAMETHasone     Tablet 6 milliGRAM(s) Oral daily  dextrose 40% Gel 15 Gram(s) Oral once  dextrose 5%. 1000 milliLiter(s) (50 mL/Hr) IV Continuous <Continuous>  dextrose 5%. 1000 milliLiter(s) (100 mL/Hr) IV Continuous <Continuous>  dextrose 50% Injectable 25 Gram(s) IV Push once  dextrose 50% Injectable 25 Gram(s) IV Push once  dextrose 50% Injectable 12.5 Gram(s) IV Push once  epoetin michael-epbx (RETACRIT) Injectable 55382 Unit(s) SubCutaneous <User Schedule>  famotidine    Tablet 20 milliGRAM(s) Oral daily  folic acid 1 milliGRAM(s) Oral daily  glucagon  Injectable 1 milliGRAM(s) IntraMuscular once  hydrALAZINE 100 milliGRAM(s) Oral three times a day  insulin lispro (ADMELOG) corrective regimen sliding scale   SubCutaneous three times a day before meals  insulin lispro (ADMELOG) corrective regimen sliding scale   SubCutaneous at bedtime  isosorbide   mononitrate ER Tablet (IMDUR) 120 milliGRAM(s) Oral daily  lamoTRIgine 100 milliGRAM(s) Oral daily  mirtazapine 30 milliGRAM(s) Oral at bedtime  oxybutynin 5 milliGRAM(s) Oral two times a day  pantoprazole    Tablet 40 milliGRAM(s) Oral before breakfast  senna 2 Tablet(s) Oral at bedtime  sodium chloride 0.65% Nasal 1 Spray(s) Both Nostrils four times a day  venlafaxine XR. 150 milliGRAM(s) Oral daily    MEDICATIONS  (PRN):  acetaminophen     Tablet .. 650 milliGRAM(s) Oral every 6 hours PRN Temp greater or equal to 38.5C (101.3F), Moderate Pain (4 - 6)  ALBUTerol    90 MICROgram(s) HFA Inhaler 2 Puff(s) Inhalation every 6 hours PRN Shortness of Breath and/or Wheezing         Vitals log        ICU Vital Signs Last 24 Hrs  T(C): 36.5 (15 [place]co 2022 04:59), Max: 36.7 (14 Jan 2022 19:46)  T(F): 97.7 (15 [place]co 2022 04:59), Max: 98.1 (14 Jan 2022 19:46)  HR: 72 (15 [place] 2022 04:59) (64 - 72)  BP: 173/95 (15 [place]co 2022 04:59) (132/73 - 173/95)  BP(mean): --  ABP: --  ABP(mean): --  RR: 18 (15 [place]co 2022 04:59) (16 - 18)  SpO2: 97% (15 [place]co 2022 04:59) (94% - 97%)           Input and Output:  I&O's Detail      Lab Data                        9.7    11.02 )-----------( 247      ( 15 Checo 2022 08:37 )             31.0     01-15    138  |  102  |  40<H>  ----------------------------<  195<H>  4.2   |  28  |  2.30<H>    Ca    8.0<L>      15 [place]co 2022 08:37  Mg     2.6     01-15              Review of Systems	      Objective     Physical Examination    heart s1s2  lung dc BS  abd soft      Pertinent Lab findings & Imaging      Nikko:  NO   Adequate UO     I&O's Detail           Discussed with:     Cultures:	        Radiology

## 2022-01-15 NOTE — PROGRESS NOTE ADULT - ASSESSMENT
75 yo M PMH of A fib (on Eliquis) , MGUS s/p PRBC transfusion 10/21, anxiety/depression, CAD s/p stents (not on plavix), CHF, Diverticulosis, HLD, HTN, thyroid nodules, CKD stage 3, DM presents to the ED with SOB    Swallow study noted  Covid positive - [place]co 15  on RA  s/p LASIX  completed ABX  DC planning - home -   vs noted  labs reviewed    Previous echo 10/21 showed: Normal LV size with normal LV systolic function with estimated LVEF 55-60%. LV diastolic function cannot determine due to atrial fibrillation  Obesity - exam and risk factors - and features - c/w ELMER - outpatient eval  AF - on AC -   D dimer noted - serial D dimer  cvs rx regimen - diuresis - cxr and proBNP c/w Vol Overload - Cardio eval in progress - old records and TTE reviewed - I and O, serial CE -   dietary discretion  Covid - in vaccinated pt - monitor VS and Sat - Acap and Robitussin PRN - monitor VS and HD and Sat - Decadron is indicated in covid with hypoxemia  pt is on Home Rx regimen of Symbicort - unclear indication - ex smoker - no history of COPD as per pt or medical record - prior CT without Emphysema  isolation for covid -

## 2022-01-15 NOTE — PROGRESS NOTE ADULT - ASSESSMENT
77 yo M with PMH AFib on Eliquis of Type 2 DM (not on insulin), BPH, CAD s/p stents >4 years ago (not on Plavix), diverticulitis (hospitalized 07/2020 at [facility]), GIB 2/2 diverticulosis (2020), anxiety, Lupus, HTN, HLD, CKD stage 3, HepC, hx of blood clots in brain (s/p surgery 2013) [facility] with shortness of breath and leg swelling. Found to be covid positive. Cardio consulted for elevated Troponins and CHF.    TTE:(10/21): Normal LV size with normal LVSF with LVEF 55-60%. LV diastolic function cannot determine due to atrial fibrillation. Mild mitral regurgitation is present. Mild aortic stenosis is  present. Mild tricuspid regurgitation is present . No evidence of any pulmonary hypertension    Volume overload (acute on chronic diastolic HF), CAD, HTN, Afib  - Compensated from HF standpoint.  Tolerating RA.  Will hold Lasix for now.  Creatinine plateaued    - Renal following  - Continue to monitor strict I/O's  - Troponin likely demand ischemia in setting of above; plan for outpatient stress.  No anginal symptoms  - EKG NSR, no ischemic changes noted.    - Hx of Afib, continue home Eliquis.  Rate-controlled.  Continue BB   - Monitor and replete electrolytes. Keep K>4.0 and Mg>2.0.  - For Hx of CAD, continue ASA, BB, and statin  - BP stable and controlled for the most part.  Continue  Norvasc, Imdur, Hydralazine, Clonidine and Coreg     D/C planning per primary team  Will continue to follow

## 2022-01-16 LAB
ANION GAP SERPL CALC-SCNC: 4 MMOL/L — LOW (ref 5–17)
BASOPHILS # BLD AUTO: 0.01 K/UL — SIGNIFICANT CHANGE UP (ref 0–0.2)
BASOPHILS NFR BLD AUTO: 0.1 % — SIGNIFICANT CHANGE UP (ref 0–2)
BUN SERPL-MCNC: 35 MG/DL — HIGH (ref 7–23)
CALCIUM SERPL-MCNC: 8.5 MG/DL — SIGNIFICANT CHANGE UP (ref 8.5–10.1)
CHLORIDE SERPL-SCNC: 106 MMOL/L — SIGNIFICANT CHANGE UP (ref 96–108)
CO2 SERPL-SCNC: 30 MMOL/L — SIGNIFICANT CHANGE UP (ref 22–31)
CREAT SERPL-MCNC: 2.1 MG/DL — HIGH (ref 0.5–1.3)
EOSINOPHIL # BLD AUTO: 0.14 K/UL — SIGNIFICANT CHANGE UP (ref 0–0.5)
EOSINOPHIL NFR BLD AUTO: 1.6 % — SIGNIFICANT CHANGE UP (ref 0–6)
GLUCOSE SERPL-MCNC: 126 MG/DL — HIGH (ref 70–99)
HCT VFR BLD CALC: 29.5 % — LOW (ref 39–50)
HGB BLD-MCNC: 9.1 G/DL — LOW (ref 13–17)
IMM GRANULOCYTES NFR BLD AUTO: 0.3 % — SIGNIFICANT CHANGE UP (ref 0–1.5)
LYMPHOCYTES # BLD AUTO: 1.01 K/UL — SIGNIFICANT CHANGE UP (ref 1–3.3)
LYMPHOCYTES # BLD AUTO: 11.7 % — LOW (ref 13–44)
MCHC RBC-ENTMCNC: 27.2 PG — SIGNIFICANT CHANGE UP (ref 27–34)
MCHC RBC-ENTMCNC: 30.8 GM/DL — LOW (ref 32–36)
MCV RBC AUTO: 88.3 FL — SIGNIFICANT CHANGE UP (ref 80–100)
MONOCYTES # BLD AUTO: 0.78 K/UL — SIGNIFICANT CHANGE UP (ref 0–0.9)
MONOCYTES NFR BLD AUTO: 9.1 % — SIGNIFICANT CHANGE UP (ref 2–14)
NEUTROPHILS # BLD AUTO: 6.63 K/UL — SIGNIFICANT CHANGE UP (ref 1.8–7.4)
NEUTROPHILS NFR BLD AUTO: 77.2 % — HIGH (ref 43–77)
NRBC # BLD: 0 /100 WBCS — SIGNIFICANT CHANGE UP (ref 0–0)
PLATELET # BLD AUTO: 207 K/UL — SIGNIFICANT CHANGE UP (ref 150–400)
POTASSIUM SERPL-MCNC: 3.8 MMOL/L — SIGNIFICANT CHANGE UP (ref 3.5–5.3)
POTASSIUM SERPL-SCNC: 3.8 MMOL/L — SIGNIFICANT CHANGE UP (ref 3.5–5.3)
RBC # BLD: 3.34 M/UL — LOW (ref 4.2–5.8)
RBC # FLD: 18.5 % — HIGH (ref 10.3–14.5)
SODIUM SERPL-SCNC: 140 MMOL/L — SIGNIFICANT CHANGE UP (ref 135–145)
WBC # BLD: 8.6 K/UL — SIGNIFICANT CHANGE UP (ref 3.8–10.5)
WBC # FLD AUTO: 8.6 K/UL — SIGNIFICANT CHANGE UP (ref 3.8–10.5)

## 2022-01-16 PROCEDURE — 99232 SBSQ HOSP IP/OBS MODERATE 35: CPT

## 2022-01-16 RX ADMIN — AMLODIPINE BESYLATE 10 MILLIGRAM(S): 2.5 TABLET ORAL at 06:15

## 2022-01-16 RX ADMIN — SENNA PLUS 2 TABLET(S): 8.6 TABLET ORAL at 20:58

## 2022-01-16 RX ADMIN — PREGABALIN 1000 MICROGRAM(S): 225 CAPSULE ORAL at 11:11

## 2022-01-16 RX ADMIN — Medication 2: at 11:52

## 2022-01-16 RX ADMIN — BUDESONIDE AND FORMOTEROL FUMARATE DIHYDRATE 2 PUFF(S): 160; 4.5 AEROSOL RESPIRATORY (INHALATION) at 17:18

## 2022-01-16 RX ADMIN — Medication 100 MILLIGRAM(S): at 06:12

## 2022-01-16 RX ADMIN — Medication 1 MILLIGRAM(S): at 11:11

## 2022-01-16 RX ADMIN — Medication 1 SPRAY(S): at 06:13

## 2022-01-16 RX ADMIN — ISOSORBIDE MONONITRATE 120 MILLIGRAM(S): 60 TABLET, EXTENDED RELEASE ORAL at 11:11

## 2022-01-16 RX ADMIN — CARVEDILOL PHOSPHATE 12.5 MILLIGRAM(S): 80 CAPSULE, EXTENDED RELEASE ORAL at 17:17

## 2022-01-16 RX ADMIN — PANTOPRAZOLE SODIUM 40 MILLIGRAM(S): 20 TABLET, DELAYED RELEASE ORAL at 06:11

## 2022-01-16 RX ADMIN — ARIPIPRAZOLE 30 MILLIGRAM(S): 15 TABLET ORAL at 11:11

## 2022-01-16 RX ADMIN — LAMOTRIGINE 100 MILLIGRAM(S): 25 TABLET, ORALLY DISINTEGRATING ORAL at 11:11

## 2022-01-16 RX ADMIN — Medication 150 MILLIGRAM(S): at 11:11

## 2022-01-16 RX ADMIN — Medication 0.3 MILLIGRAM(S): at 06:11

## 2022-01-16 RX ADMIN — BUDESONIDE AND FORMOTEROL FUMARATE DIHYDRATE 2 PUFF(S): 160; 4.5 AEROSOL RESPIRATORY (INHALATION) at 06:12

## 2022-01-16 RX ADMIN — APIXABAN 5 MILLIGRAM(S): 2.5 TABLET, FILM COATED ORAL at 06:12

## 2022-01-16 RX ADMIN — FAMOTIDINE 20 MILLIGRAM(S): 10 INJECTION INTRAVENOUS at 11:11

## 2022-01-16 RX ADMIN — CARVEDILOL PHOSPHATE 12.5 MILLIGRAM(S): 80 CAPSULE, EXTENDED RELEASE ORAL at 06:11

## 2022-01-16 RX ADMIN — Medication 0.5 MILLIGRAM(S): at 11:11

## 2022-01-16 RX ADMIN — APIXABAN 5 MILLIGRAM(S): 2.5 TABLET, FILM COATED ORAL at 17:17

## 2022-01-16 RX ADMIN — Medication 5 MILLIGRAM(S): at 17:17

## 2022-01-16 RX ADMIN — Medication 5 MILLIGRAM(S): at 06:11

## 2022-01-16 RX ADMIN — Medication 100 MILLIGRAM(S): at 13:41

## 2022-01-16 RX ADMIN — ATORVASTATIN CALCIUM 10 MILLIGRAM(S): 80 TABLET, FILM COATED ORAL at 20:59

## 2022-01-16 RX ADMIN — Medication 81 MILLIGRAM(S): at 11:11

## 2022-01-16 RX ADMIN — MIRTAZAPINE 30 MILLIGRAM(S): 45 TABLET, ORALLY DISINTEGRATING ORAL at 20:58

## 2022-01-16 RX ADMIN — Medication 0.3 MILLIGRAM(S): at 17:18

## 2022-01-16 NOTE — PROGRESS NOTE ADULT - SUBJECTIVE AND OBJECTIVE BOX
Date/Time Patient Seen:  		  Referring MD:   Data Reviewed	       Patient is a 76y old  Male who presents with a chief complaint of CHF exacerbation (15 Checo 2022 12:28)      Subjective/HPI     PAST MEDICAL & SURGICAL HISTORY:  Hypertension    Diabetes mellitus    Lupus    Hepatitis C    Anxiety and depression    CAD (coronary artery disease)  s/p stents    Diverticulosis    Hyperlipidemia    HTN (hypertension)  c/b multiple episodes of hypertensive urgency    HLD (hyperlipidemia)    Atrial fibrillation  first documented on EKG 10/7/2021    CAD (coronary artery disease)  s/p stents (not on plavix)    Type 2 diabetes mellitus  not on home insulin/ Meds    Anxiety  Depression  multiple psych medications    History of diverticulitis  07/2021    Diverticulosis  c/b GIB in 2020    Afib  on AC    Stage 3 chronic kidney disease    Anemia of chronic disease  Monoclonal Gammopathy-MGUS  pRBC transfusion 10/15/21    Chronic diastolic congestive heart failure    Multiple thyroid nodules    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  Had surgery ( April 2013 )    S/P tonsillectomy    S/P arthroscopic knee surgery  Bilateral ( 2005 )    Torsion of testicle  Had surgery at age 14    Pilonidal cyst  Had surgery ( 1969 )    S/P cataract surgery  Bilateral    H/O hernia repair          Medication list         MEDICATIONS  (STANDING):  amLODIPine   Tablet 10 milliGRAM(s) Oral daily  apixaban 5 milliGRAM(s) Oral two times a day  ARIPiprazole 30 milliGRAM(s) Oral daily  aspirin enteric coated 81 milliGRAM(s) Oral daily  atorvastatin 10 milliGRAM(s) Oral at bedtime  budesonide 160 MICROgram(s)/formoterol 4.5 MICROgram(s) Inhaler 2 Puff(s) Inhalation two times a day  carvedilol 12.5 milliGRAM(s) Oral every 12 hours  cloNIDine 0.3 milliGRAM(s) Oral two times a day  cyanocobalamin 1000 MICROGram(s) Oral daily  dexAMETHasone     Tablet 6 milliGRAM(s) Oral daily  dextrose 40% Gel 15 Gram(s) Oral once  dextrose 5%. 1000 milliLiter(s) (50 mL/Hr) IV Continuous <Continuous>  dextrose 5%. 1000 milliLiter(s) (100 mL/Hr) IV Continuous <Continuous>  dextrose 50% Injectable 25 Gram(s) IV Push once  dextrose 50% Injectable 12.5 Gram(s) IV Push once  dextrose 50% Injectable 25 Gram(s) IV Push once  epoetin michael-epbx (RETACRIT) Injectable 57461 Unit(s) SubCutaneous <User Schedule>  famotidine    Tablet 20 milliGRAM(s) Oral daily  folic acid 1 milliGRAM(s) Oral daily  glucagon  Injectable 1 milliGRAM(s) IntraMuscular once  hydrALAZINE 100 milliGRAM(s) Oral three times a day  insulin lispro (ADMELOG) corrective regimen sliding scale   SubCutaneous at bedtime  insulin lispro (ADMELOG) corrective regimen sliding scale   SubCutaneous three times a day before meals  isosorbide   mononitrate ER Tablet (IMDUR) 120 milliGRAM(s) Oral daily  lamoTRIgine 100 milliGRAM(s) Oral daily  mirtazapine 30 milliGRAM(s) Oral at bedtime  oxybutynin 5 milliGRAM(s) Oral two times a day  pantoprazole    Tablet 40 milliGRAM(s) Oral before breakfast  senna 2 Tablet(s) Oral at bedtime  sodium chloride 0.65% Nasal 1 Spray(s) Both Nostrils four times a day  venlafaxine XR. 150 milliGRAM(s) Oral daily    MEDICATIONS  (PRN):  acetaminophen     Tablet .. 650 milliGRAM(s) Oral every 6 hours PRN Temp greater or equal to 38.5C (101.3F), Moderate Pain (4 - 6)  ALBUTerol    90 MICROgram(s) HFA Inhaler 2 Puff(s) Inhalation every 6 hours PRN Shortness of Breath and/or Wheezing  LORazepam     Tablet 0.5 milliGRAM(s) Oral daily PRN Anxiety         Vitals log        ICU Vital Signs Last 24 Hrs  T(C): 36.9 (16 Jan 2022 04:40), Max: 36.9 (16 Jan 2022 04:40)  T(F): 98.5 (16 Jan 2022 04:40), Max: 98.5 (16 Jan 2022 04:40)  HR: 64 (16 Jan 2022 04:40) (53 - 69)  BP: 173/80 (16 Jan 2022 04:40) (133/80 - 174/98)  BP(mean): --  ABP: --  ABP(mean): --  RR: 19 (16 Jan 2022 04:40) (17 - 19)  SpO2: 95% (16 Jan 2022 04:40) (95% - 98%)           Input and Output:  I&O's Detail    15 [place]co 2022 07:01  -  16 Jan 2022 05:17  --------------------------------------------------------  IN:    Oral Fluid: 480 mL  Total IN: 480 mL    OUT:    Voided (mL): 1550 mL  Total OUT: 1550 mL    Total NET: -1070 mL          Lab Data                        9.7    11.02 )-----------( 247      ( 15 [place]co 2022 08:37 )             31.0     01-15    138  |  102  |  40<H>  ----------------------------<  195<H>  4.2   |  28  |  2.30<H>    Ca    8.0<L>      15 [place]co 2022 08:37  Mg     2.6     01-15              Review of Systems	      Objective     Physical Examination    heart s1s2  lung dec BS  abd soft      Pertinent Lab findings & Imaging      Nikko:  NO   Adequate UO     I&O's Detail    15 [place]co 2022 07:01  -  16 Jan 2022 05:17  --------------------------------------------------------  IN:    Oral Fluid: 480 mL  Total IN: 480 mL    OUT:    Voided (mL): 1550 mL  Total OUT: 1550 mL    Total NET: -1070 mL               Discussed with:     Cultures:	        Radiology

## 2022-01-16 NOTE — CONSULT NOTE ADULT - SUBJECTIVE AND OBJECTIVE BOX
[facility]  Radu Weber PA-C  [address] [place] [address]  [phone]      Chief Complaint:  Patient is a 76y old  Male who presents with a chief complaint of CHF exacerbation (2022 05:17)    Hypertension    Diabetes mellitus    Lupus    Hepatitis C    Anxiety and depression    CAD (coronary artery disease)    Diverticulosis    Hyperlipidemia    HTN (hypertension)    HLD (hyperlipidemia)    Atrial fibrillation    CAD (coronary artery disease)    Type 2 diabetes mellitus    Anxiety    History of diverticulitis    Diverticulosis    Afib    Stage 3 chronic kidney disease    Anemia of chronic disease    Chronic diastolic congestive heart failure    Multiple thyroid nodules    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    S/P tonsillectomy    S/P arthroscopic knee surgery    Torsion of testicle    Pilonidal cyst    S/P cataract surgery    H/O hernia repair       HPI:  75 yo M PMH of A fib (on Eliquis) , MGUS s/p PRBC transfusion 10/21, anxiety/depression, CAD s/p stents (not on plavix), CHF, Diverticulosis, HLD, HTN, thyroid nodules, CKD stage 3, DM2 presents to the ED with SOB and LE edema. The patient lives in a group home and is unsure why the supervisor suggested he come to the ED. He reports difficulty breathing for a few months now, but it has been worsening over the last week. His shortness of breath is primarily on exertion. The patient also has wheezing and a dry cough. He states he has been sleeping in a sitting position, but does not understand why he had to come to the ED, states his LE swelling does not seem to be worse than his baseline. Also complaining of left sided chest pain which has been ongoing for a few days now, claims it to be 2/2 reflux. Denies radiation of the pain and diaphoresis. Denies fevers/chills, n/v/c/d, myalgias.     COVID-19 vaccinated with Moderna x2 in 2021, found to be COVID+    ED course:   Vitals:  BP:172/86    HR:88   Temp:97F   RR:16  O2: 100% on 10L/min NRB   Patient received: 324mg ASA x1, 1L LR x1  Labs: H/H 9.2/29.1, INR 1.47, Troponin I 114.7--> 117.8, K+ 3.3, BUN/Cr 28/2.30, Procal 0.20, BNP 4574  UA: occasional bacteria, small blood, 500 protein, 0-2 granular casts   EKG: Afib, Incomplete RBBB, Moderate voltage criteria for LVH, may be normal variant nonspecific T wave abnormality, Prolonged QT  CXR: There is a prominent cardiac silhouette and of the bronchovascular markings at the bilateral perihilar lower lung zones. Again noted is minimal blunting at the right costophrenic angle which may reflect trace pleural effusion and/or pleural thickening. (2022 14:17)      No Known Allergies      acetaminophen     Tablet .. 650 milliGRAM(s) Oral every 6 hours PRN  ALBUTerol    90 MICROgram(s) HFA Inhaler 2 Puff(s) Inhalation every 6 hours PRN  amLODIPine   Tablet 10 milliGRAM(s) Oral daily  apixaban 5 milliGRAM(s) Oral two times a day  ARIPiprazole 30 milliGRAM(s) Oral daily  aspirin enteric coated 81 milliGRAM(s) Oral daily  atorvastatin 10 milliGRAM(s) Oral at bedtime  budesonide 160 MICROgram(s)/formoterol 4.5 MICROgram(s) Inhaler 2 Puff(s) Inhalation two times a day  carvedilol 12.5 milliGRAM(s) Oral every 12 hours  cloNIDine 0.3 milliGRAM(s) Oral two times a day  cyanocobalamin 1000 MICROGram(s) Oral daily  dextrose 40% Gel 15 Gram(s) Oral once  dextrose 5%. 1000 milliLiter(s) IV Continuous <Continuous>  dextrose 5%. 1000 milliLiter(s) IV Continuous <Continuous>  dextrose 50% Injectable 25 Gram(s) IV Push once  dextrose 50% Injectable 12.5 Gram(s) IV Push once  dextrose 50% Injectable 25 Gram(s) IV Push once  epoetin michael-epbx (RETACRIT) Injectable 23059 Unit(s) SubCutaneous <User Schedule>  famotidine    Tablet 20 milliGRAM(s) Oral daily  folic acid 1 milliGRAM(s) Oral daily  glucagon  Injectable 1 milliGRAM(s) IntraMuscular once  hydrALAZINE 100 milliGRAM(s) Oral three times a day  insulin lispro (ADMELOG) corrective regimen sliding scale   SubCutaneous at bedtime  insulin lispro (ADMELOG) corrective regimen sliding scale   SubCutaneous three times a day before meals  isosorbide   mononitrate ER Tablet (IMDUR) 120 milliGRAM(s) Oral daily  lamoTRIgine 100 milliGRAM(s) Oral daily  LORazepam     Tablet 0.5 milliGRAM(s) Oral daily PRN  mirtazapine 30 milliGRAM(s) Oral at bedtime  oxybutynin 5 milliGRAM(s) Oral two times a day  pantoprazole    Tablet 40 milliGRAM(s) Oral before breakfast  senna 2 Tablet(s) Oral at bedtime  sodium chloride 0.65% Nasal 1 Spray(s) Both Nostrils four times a day  venlafaxine XR. 150 milliGRAM(s) Oral daily        FAMILY HISTORY:  FHx: throat cancer    No family history of colorectal cancer          Review of Systems:  REVIEW OF SYSTEMS:  CONSTITUTIONAL: No fever or chills  HEENT:  No headache, no sore throat  RESPIRATORY: +Cough, No wheezing, or shortness of breath  CARDIOVASCULAR: No chest pain, palpitations, or leg swelling  GASTROINTESTINAL: No abd pain, nausea, +vomiting, or diarrhea, feels like food caught in throat   GENITOURINARY: No dysuria, frequency, or hematuria  NEUROLOGICAL: no focal weakness or dizziness  MUSCULOSKELETAL: no myalgias           Relevant Family History:   n/c    Relevant Social History: n/c      Physical Exam:  Vital Signs:  Vital Signs Last 24 Hrs  T(C): 36.7 (2022 11:01), Max: 36.9 (2022 04:40)  T(F): 98 (2022 11:01), Max: 98.5 (2022 04:40)  HR: 75 (2022 11:01) (62 - 75)  BP: 148/82 (2022 11:01) (133/80 - 173/80)  BP(mean): --  RR: 18 (2022 11:01) (17 - 19)  SpO2: 95% (2022 11:01) (95% - 98%)  Daily     Daily Weight in k (2022 06:17)      GENERAL: NAD  HEENT:  NC/AT, EOMI, moist mucous membranes  CHEST/LUNG:  CTA b/l, no rales, wheezes, or rhonchi  HEART:  RRR, S1, S2  ABDOMEN:  BS+, soft, nontender, nondistended  EXTREMITIES: no edema, cyanosis, or calf tenderness  NERVOUS SYSTEM: AA&Ox3    Laboratory:                            9.1    8.60  )-----------( 207      ( 2022 07:37 )             29.5     01-16    140  |  106  |  35<H>  ----------------------------<  126<H>  3.8   |  30  |  2.10<H>    Ca    8.5      2022 07:37  Mg     2.6     -15              Imaging:

## 2022-01-16 NOTE — PROGRESS NOTE ADULT - ASSESSMENT
75 yo M with PMH AFib on Eliquis of Type 2 DM (not on insulin), BPH, CAD s/p stents >4 years ago (not on Plavix), diverticulitis (hospitalized 07/2020 at [facility]), GIB 2/2 diverticulosis (2020), anxiety, Lupus, HTN, HLD, CKD stage 3, HepC, hx of blood clots in brain (s/p surgery 2013) [facility] with shortness of breath and leg swelling. Found to be covid positive. Cardio consulted for elevated Troponins and CHF.    TTE:(10/21): Normal LV size with normal LVSF with LVEF 55-60%. LV diastolic function cannot determine due to atrial fibrillation. Mild mitral regurgitation is present. Mild aortic stenosis is  present. Mild tricuspid regurgitation is present . No evidence of any pulmonary hypertension    Volume overload (acute on chronic diastolic HF), CAD, HTN, Afib  - Compensated from HF standpoint.  Tolerating RA.  Will hold Lasix for now.  Creatinine plateaued    - Renal following  - Continue to monitor strict I/O's  - Troponin likely demand ischemia in setting of above; plan for outpatient stress.  No anginal symptoms  - EKG NSR, no ischemic changes noted.    - Hx of Afib, continue home Eliquis.  Rate-controlled.  Continue BB   - Monitor and replete electrolytes. Keep K>4.0 and Mg>2.0.  - For Hx of CAD, continue ASA, BB, and statin  - BP stable and controlled for the most part.  Continue  Norvasc, Imdur, Hydralazine, Clonidine and Coreg     D/C planning per primary team  Will continue to follow

## 2022-01-16 NOTE — PROGRESS NOTE ADULT - ASSESSMENT
75 yo M PMH of A fib (on Eliquis) , MGUS s/p PRBC transfusion 10/21, anxiety/depression, CAD s/p stents (not on plavix), CHF, Diverticulosis, HLD, HTN, thyroid nodules, CKD stage 3, DM presents to the ED with SOB    Swallow study noted  Covid positive - Checo 15 - awaiting neg result to return to Group Home  on RA  s/p LASIX  completed ABX  DC planning - home -   vs noted  labs reviewed    Previous echo 10/21 showed: Normal LV size with normal LV systolic function with estimated LVEF 55-60%. LV diastolic function cannot determine due to atrial fibrillation  Obesity - exam and risk factors - and features - c/w ELMER - outpatient eval  AF - on AC -   D dimer noted - serial D dimer  cvs rx regimen - diuresis - cxr and proBNP c/w Vol Overload - Cardio eval in progress - old records and TTE reviewed - I and O, serial CE -   dietary discretion  Covid - in vaccinated pt - monitor VS and Sat - Acap and Robitussin PRN - monitor VS and HD and Sat - Decadron is indicated in covid with hypoxemia  pt is on Home Rx regimen of Symbicort - unclear indication - ex smoker - no history of COPD as per pt or medical record - prior CT without Emphysema  isolation for covid -

## 2022-01-16 NOTE — CONSULT NOTE ADULT - ASSESSMENT
Dysphagia  work up reviewed  aspiration precautions   says he's a little better today  continue famotidine

## 2022-01-16 NOTE — PROGRESS NOTE ADULT - SUBJECTIVE AND OBJECTIVE BOX
[facility] - Fifi Bliss, Bonifacio, Kalpesh, Fatimah, Loc Rosario  Office Number:  [phone]    Patient resting comfortably in bed in NAD.  Laying flat with no respiratory distress.  No complaints of chest pain, dyspnea, palpitations, PND, or orthopnea.  No overnight events.     F/U for:  CHF         MEDICATIONS  (STANDING):  amLODIPine   Tablet 10 milliGRAM(s) Oral daily  apixaban 5 milliGRAM(s) Oral two times a day  ARIPiprazole 30 milliGRAM(s) Oral daily  aspirin enteric coated 81 milliGRAM(s) Oral daily  atorvastatin 10 milliGRAM(s) Oral at bedtime  budesonide 160 MICROgram(s)/formoterol 4.5 MICROgram(s) Inhaler 2 Puff(s) Inhalation two times a day  carvedilol 12.5 milliGRAM(s) Oral every 12 hours  cloNIDine 0.3 milliGRAM(s) Oral two times a day  cyanocobalamin 1000 MICROGram(s) Oral daily  dextrose 40% Gel 15 Gram(s) Oral once  dextrose 5%. 1000 milliLiter(s) (50 mL/Hr) IV Continuous <Continuous>  dextrose 5%. 1000 milliLiter(s) (100 mL/Hr) IV Continuous <Continuous>  dextrose 50% Injectable 25 Gram(s) IV Push once  dextrose 50% Injectable 12.5 Gram(s) IV Push once  dextrose 50% Injectable 25 Gram(s) IV Push once  epoetin michael-epbx (RETACRIT) Injectable 30070 Unit(s) SubCutaneous <User Schedule>  famotidine    Tablet 20 milliGRAM(s) Oral daily  folic acid 1 milliGRAM(s) Oral daily  glucagon  Injectable 1 milliGRAM(s) IntraMuscular once  hydrALAZINE 100 milliGRAM(s) Oral three times a day  insulin lispro (ADMELOG) corrective regimen sliding scale   SubCutaneous at bedtime  insulin lispro (ADMELOG) corrective regimen sliding scale   SubCutaneous three times a day before meals  isosorbide   mononitrate ER Tablet (IMDUR) 120 milliGRAM(s) Oral daily  lamoTRIgine 100 milliGRAM(s) Oral daily  mirtazapine 30 milliGRAM(s) Oral at bedtime  oxybutynin 5 milliGRAM(s) Oral two times a day  pantoprazole    Tablet 40 milliGRAM(s) Oral before breakfast  senna 2 Tablet(s) Oral at bedtime  sodium chloride 0.65% Nasal 1 Spray(s) Both Nostrils four times a day  venlafaxine XR. 150 milliGRAM(s) Oral daily    MEDICATIONS  (PRN):  acetaminophen     Tablet .. 650 milliGRAM(s) Oral every 6 hours PRN Temp greater or equal to 38.5C (101.3F), Moderate Pain (4 - 6)  ALBUTerol    90 MICROgram(s) HFA Inhaler 2 Puff(s) Inhalation every 6 hours PRN Shortness of Breath and/or Wheezing  LORazepam     Tablet 0.5 milliGRAM(s) Oral daily PRN Anxiety      Allergies    No Known Allergies    Intolerances        Vital Signs Last 24 Hrs  T(C): 36.7 (16 Jan 2022 11:01), Max: 36.9 (16 Jan 2022 04:40)  T(F): 98 (16 Jan 2022 11:01), Max: 98.5 (16 Jan 2022 04:40)  HR: 75 (16 Jan 2022 11:01) (64 - 75)  BP: 148/82 (16 Jan 2022 11:01) (133/80 - 173/80)  BP(mean): --  RR: 18 (16 Jan 2022 11:01) (17 - 19)  SpO2: 95% (16 Jan 2022 11:01) (95% - 97%)    I&O's Summary    15 [place] 2022 07:01  -  16 Jan 2022 07:00  --------------------------------------------------------  IN: 480 mL / OUT: 1550 mL / NET: -1070 mL        ON EXAM:    PE deferred secondary to COVID    LABS: All Labs Reviewed:                        9.1    8.60  )-----------( 207      ( 16 Jan 2022 07:37 )             29.5                         9.7    11.02 )-----------( 247      ( 15 Checo 2022 08:37 )             31.0                         8.9    10.67 )-----------( 267      ( 14 Jan 2022 06:25 )             29.0     16 Jan 2022 07:37    140    |  106    |  35     ----------------------------<  126    3.8     |  30     |  2.10   15 Checo 2022 08:37    138    |  102    |  40     ----------------------------<  195    4.2     |  28     |  2.30   14 Jan 2022 06:25    139    |  103    |  49     ----------------------------<  198    3.6     |  30     |  2.50     Ca    8.5        16 Jan 2022 07:37  Ca    8.0        15 [place]co 2022 08:37  Ca    8.4        14 Jan 2022 06:25  Mg     2.6       15 Checo 2022 08:37

## 2022-01-16 NOTE — CONSULT NOTE ADULT - CONSULT REQUESTED DATE/TIME
03-Jan-2022 16:43
16-Jan-2022 06:00
03-Jan-2022 12:46
04-Jan-2022 15:19
03-Jan-2022 15:57
07-Jan-2022 18:15

## 2022-01-16 NOTE — PROGRESS NOTE ADULT - SUBJECTIVE AND OBJECTIVE BOX
CHIEF COMPLAINT/INTERVAL HISTORY:  Pt. seen and evaluated for acute hypoxic respiratory failure 2/2 COVID pneumonia and acute on chronic diastolic chf.  Pt. is in no distress.  On room air with SpO2 in 90th%.  Awaiting negative COVID PCR to return to group home.     REVIEW OF SYSTEMS:  No fever, CP, SOB, or abdominal pain    Vital Signs Last 24 Hrs  T(C): 36.7 (16 Jan 2022 11:01), Max: 36.9 (16 Jan 2022 04:40)  T(F): 98 (16 Jan 2022 11:01), Max: 98.5 (16 Jan 2022 04:40)  HR: 75 (16 Jan 2022 11:01) (62 - 75)  BP: 148/82 (16 Jan 2022 11:01) (133/80 - 173/80)  BP(mean): --  RR: 18 (16 Jan 2022 11:01) (17 - 19)  SpO2: 95% (16 Jan 2022 11:01) (95% - 98%)    PHYSICAL EXAM:  GENERAL: NAD  HEENT: EOMI, hearing normal, conjunctiva and sclera clear  Chest: Diminished BS at bases, no wheezing  CV: S1S2, RRR,   GI: soft, +BS, NT/ND  Musculoskeletal: no edema  Psychiatric: affect nL, mood nL  Skin: warm and dry    LABS:                        9.1    8.60  )-----------( 207      ( 16 Jan 2022 07:37 )             29.5     01-16    140  |  106  |  35<H>  ----------------------------<  126<H>  3.8   |  30  |  2.10<H>    Ca    8.5      16 Jan 2022 07:37  Mg     2.6     01-15            Assessment and Plan:  77yo M from group home, with PMH of DM2, HTN, HLD, CAD (s/p stents), Afib (on Eliquis), stage 3 CKD, hx of MGUS; a/w acute hypoxic and hypercarbic respiratory failure due to COVID-19 PNA, and acute on chronic diastolic CHF.    Acute hypoxic and hypercarbic respiratory failure due to COVID19 PNA and acute CHF:  -blood gas improved significantly   -completed remdesivir 5-day course  -completing course of dexamethasone 6mg daily   -has been on NC - now tolerated RA at rest  -airborne/contact precautions  -Pulm/ID following ([facility]), recs appreciated      Acute on chronic diastolic CHF:  - has been on lasix 40mg IV BID; strict I/Os; monitoring hemodynamics/renal function w/diuresis  - transitioned to lasix 40mg PO BID.  Now off diuretics per cardiology due to ariella    - monitor O2, daily weights   - cardio ([facility]), recs appreciated    Hypertensive urgency:  - continue coreg, clonidine, norvasc, hydralazine  - BP control improving after increasing clonidine to 0.3mg po BID and increasing coreg to 12.5mg po BID   - cardio ([facility]), recs appreciated  - monitor BP    Acute epistaxis:  - likely was mucosal bleed from drying O2 and bled longer due to Eliquis, ASA, and elevated BUN  - bleeding controlled with Afrin/lidocaine soaked cotton with pressure for 5 minutes per ENT recs  - ENT ([place]), recs appreciated  - Ocean spray as needed    ARIELLA on CKD 3:  - baseline Cr appears to be ~2.0 - renally dose meds, no nephrotoxics  - Cr trended up to 2.5, now off lasix with improving renal indices.   - nephro ([facility]), recs appreciated    Anemia:  - pt with hx of MGUS and CKD likely contributing to anemia, along with current acute illness  - Hgb had trended down to 7.5, now improving and >8  - no gross hematuria, hematochezia, melena; monitor for signs of blood loss  - discussed blood transfusion with the pt who is hesitant as he is feeling overall better  - Retacrit per nephro orders for the renal failure component of anemia   - found to be iron deficient, s/p venofer 100mg IV q24h x3 doses.    - goal Hgb > 8    Afib:  - c/w Eliquis  - c/w coreg  - cardio ([facility]), recs appreciated    Type 2 DM:  - goal glucose 100-180  - monitor FSG   - c/w HISS    psych - continue abilify, lamictal, effexor    Dysphagia:  s/p Modified barium swallow study and now on Consistent carb/DASH diet with mildly thick liquids.  Pt. has been having intermittent episodes of feeling food getting stuck and N/V.  He was offered GI consultation/evaluation but initially  declined.  Now accepting of GI consultation.     DVT prophy   - c/w Eliquis    Disp: per SW, pt's group home cannot accept him if he is COVID positive.  Repeat COVID PCR tomorrow morning.

## 2022-01-16 NOTE — CHART NOTE - NSCHARTNOTEFT_GEN_A_CORE
Assessment: 77 y/o male adm with SOB, HF, + COVID 19. PMH HF, anemia of chronic disease, CKD, afib, anxiety, diverticulitis, Type 2 DM, CAD, HLD, HTN, multiple thyroid nodules.   Pt visited at bedside this am. Pt sleeping soundly in bed, EMR reviewed. Pt noted to be tolerating diet with a good po intake. SLP/MBS completed on 1/13 which rx regular diet consistency with mildly thickened liquids. Last BM 1/14    Factors impacting intake: [x ] none [ ] nausea  [ ] vomiting [ ] diarrhea [ ] constipation  [ ]chewing problems [ ] swallowing issues  [ ] other:     Diet Presciption: Diet, Consistent Carbohydrate w/Evening Snack:   DASH/TLC {Sodium & Cholesterol Restricted}  Mildly Thick Liquids (MILDTHICKLIQS) (01-13-22 @ 13:57)    Intake: %    Current Weight: 1/5 249.3# 1/16 238#   wt loss due to fluid loss (adm with HF)  1+ edema to left/right foot.    % Weight Change    Pertinent Medications: MEDICATIONS  (STANDING):  amLODIPine   Tablet 10 milliGRAM(s) Oral daily  apixaban 5 milliGRAM(s) Oral two times a day  ARIPiprazole 30 milliGRAM(s) Oral daily  aspirin enteric coated 81 milliGRAM(s) Oral daily  atorvastatin 10 milliGRAM(s) Oral at bedtime  budesonide 160 MICROgram(s)/formoterol 4.5 MICROgram(s) Inhaler 2 Puff(s) Inhalation two times a day  carvedilol 12.5 milliGRAM(s) Oral every 12 hours  cloNIDine 0.3 milliGRAM(s) Oral two times a day  cyanocobalamin 1000 MICROGram(s) Oral daily  dextrose 40% Gel 15 Gram(s) Oral once  dextrose 5%. 1000 milliLiter(s) (50 mL/Hr) IV Continuous <Continuous>  dextrose 5%. 1000 milliLiter(s) (100 mL/Hr) IV Continuous <Continuous>  dextrose 50% Injectable 25 Gram(s) IV Push once  dextrose 50% Injectable 12.5 Gram(s) IV Push once  dextrose 50% Injectable 25 Gram(s) IV Push once  epoetin michael-epbx (RETACRIT) Injectable 84515 Unit(s) SubCutaneous <User Schedule>  famotidine    Tablet 20 milliGRAM(s) Oral daily  folic acid 1 milliGRAM(s) Oral daily  glucagon  Injectable 1 milliGRAM(s) IntraMuscular once  hydrALAZINE 100 milliGRAM(s) Oral three times a day  insulin lispro (ADMELOG) corrective regimen sliding scale   SubCutaneous at bedtime  insulin lispro (ADMELOG) corrective regimen sliding scale   SubCutaneous three times a day before meals  isosorbide   mononitrate ER Tablet (IMDUR) 120 milliGRAM(s) Oral daily  lamoTRIgine 100 milliGRAM(s) Oral daily  mirtazapine 30 milliGRAM(s) Oral at bedtime  oxybutynin 5 milliGRAM(s) Oral two times a day  pantoprazole    Tablet 40 milliGRAM(s) Oral before breakfast  senna 2 Tablet(s) Oral at bedtime  sodium chloride 0.65% Nasal 1 Spray(s) Both Nostrils four times a day  venlafaxine XR. 150 milliGRAM(s) Oral daily    MEDICATIONS  (PRN):  acetaminophen     Tablet .. 650 milliGRAM(s) Oral every 6 hours PRN Temp greater or equal to 38.5C (101.3F), Moderate Pain (4 - 6)  ALBUTerol    90 MICROgram(s) HFA Inhaler 2 Puff(s) Inhalation every 6 hours PRN Shortness of Breath and/or Wheezing  LORazepam     Tablet 0.5 milliGRAM(s) Oral daily PRN Anxiety    Pertinent Labs: 01-16 Na140 mmol/L Glu 126 mg/dL<H> K+ 3.8 mmol/L Cr  2.10 mg/dL<H> BUN 35 mg/dL<H> 01-11 Alb 2.7 g/dL<L>     CAPILLARY BLOOD GLUCOSE      POCT Blood Glucose.: 121 mg/dL (16 Jan 2022 08:08)  POCT Blood Glucose.: 142 mg/dL (15 [place]co 2022 21:43)  POCT Blood Glucose.: 120 mg/dL (15 Checo 2022 16:27)  POCT Blood Glucose.: 150 mg/dL (15 Checo 2022 12:04)    Skin: no pressure ulcers noted.     Estimated Needs:   [x ] no change since previous assessment  [ ] recalculated:     Previous Nutrition Diagnosis:   [ ] Inadequate Energy Intake [ ]Inadequate Oral Intake [ ] Excessive Energy Intake   [ ] Underweight [ ] Increased Nutrient Needs [ ] Overweight/Obesity   [ ] Altered GI Function [ ] Unintended Weight Loss [ ] Food & Nutrition Related Knowledge Deficit [ ] Malnutrition   [x] decreased nutrient needs    Nutrition Diagnosis is [x ] ongoing  [ ] resolved [ ] not applicable     New Nutrition Diagnosis: [x ] not applicable       Interventions:   Recommend  [ ] Change Diet To:  [ ] Nutrition Supplement  [ ] Nutrition Support  [ x] Other: continue current diet order; honor food preferences within diet order when requested.     Monitoring and Evaluation:   [x ] PO intake [ x ] Tolerance to diet prescription [ x ] weights [ x ] labs[ x ] follow up per protocol  [ ] other:

## 2022-01-17 LAB
ANION GAP SERPL CALC-SCNC: 4 MMOL/L — LOW (ref 5–17)
BASOPHILS # BLD AUTO: 0.01 K/UL — SIGNIFICANT CHANGE UP (ref 0–0.2)
BASOPHILS NFR BLD AUTO: 0.1 % — SIGNIFICANT CHANGE UP (ref 0–2)
BUN SERPL-MCNC: 34 MG/DL — HIGH (ref 7–23)
CALCIUM SERPL-MCNC: 9 MG/DL — SIGNIFICANT CHANGE UP (ref 8.5–10.1)
CHLORIDE SERPL-SCNC: 105 MMOL/L — SIGNIFICANT CHANGE UP (ref 96–108)
CO2 SERPL-SCNC: 30 MMOL/L — SIGNIFICANT CHANGE UP (ref 22–31)
CREAT SERPL-MCNC: 2.1 MG/DL — HIGH (ref 0.5–1.3)
EOSINOPHIL # BLD AUTO: 0.13 K/UL — SIGNIFICANT CHANGE UP (ref 0–0.5)
EOSINOPHIL NFR BLD AUTO: 1.5 % — SIGNIFICANT CHANGE UP (ref 0–6)
GLUCOSE SERPL-MCNC: 111 MG/DL — HIGH (ref 70–99)
HCT VFR BLD CALC: 31.1 % — LOW (ref 39–50)
HGB BLD-MCNC: 9.6 G/DL — LOW (ref 13–17)
IMM GRANULOCYTES NFR BLD AUTO: 0.3 % — SIGNIFICANT CHANGE UP (ref 0–1.5)
LYMPHOCYTES # BLD AUTO: 1.13 K/UL — SIGNIFICANT CHANGE UP (ref 1–3.3)
LYMPHOCYTES # BLD AUTO: 12.6 % — LOW (ref 13–44)
MCHC RBC-ENTMCNC: 27.4 PG — SIGNIFICANT CHANGE UP (ref 27–34)
MCHC RBC-ENTMCNC: 30.9 GM/DL — LOW (ref 32–36)
MCV RBC AUTO: 88.9 FL — SIGNIFICANT CHANGE UP (ref 80–100)
MONOCYTES # BLD AUTO: 0.83 K/UL — SIGNIFICANT CHANGE UP (ref 0–0.9)
MONOCYTES NFR BLD AUTO: 9.3 % — SIGNIFICANT CHANGE UP (ref 2–14)
NEUTROPHILS # BLD AUTO: 6.81 K/UL — SIGNIFICANT CHANGE UP (ref 1.8–7.4)
NEUTROPHILS NFR BLD AUTO: 76.2 % — SIGNIFICANT CHANGE UP (ref 43–77)
NRBC # BLD: 0 /100 WBCS — SIGNIFICANT CHANGE UP (ref 0–0)
PLATELET # BLD AUTO: 203 K/UL — SIGNIFICANT CHANGE UP (ref 150–400)
POTASSIUM SERPL-MCNC: 3.9 MMOL/L — SIGNIFICANT CHANGE UP (ref 3.5–5.3)
POTASSIUM SERPL-SCNC: 3.9 MMOL/L — SIGNIFICANT CHANGE UP (ref 3.5–5.3)
RBC # BLD: 3.5 M/UL — LOW (ref 4.2–5.8)
RBC # FLD: 18.8 % — HIGH (ref 10.3–14.5)
SARS-COV-2 RNA SPEC QL NAA+PROBE: DETECTED
SODIUM SERPL-SCNC: 139 MMOL/L — SIGNIFICANT CHANGE UP (ref 135–145)
WBC # BLD: 8.94 K/UL — SIGNIFICANT CHANGE UP (ref 3.8–10.5)
WBC # FLD AUTO: 8.94 K/UL — SIGNIFICANT CHANGE UP (ref 3.8–10.5)

## 2022-01-17 PROCEDURE — 99232 SBSQ HOSP IP/OBS MODERATE 35: CPT

## 2022-01-17 RX ADMIN — Medication 0.5 MILLIGRAM(S): at 06:14

## 2022-01-17 RX ADMIN — ARIPIPRAZOLE 30 MILLIGRAM(S): 15 TABLET ORAL at 11:12

## 2022-01-17 RX ADMIN — Medication 0.3 MILLIGRAM(S): at 05:51

## 2022-01-17 RX ADMIN — LAMOTRIGINE 100 MILLIGRAM(S): 25 TABLET, ORALLY DISINTEGRATING ORAL at 11:13

## 2022-01-17 RX ADMIN — ERYTHROPOIETIN 10000 UNIT(S): 10000 INJECTION, SOLUTION INTRAVENOUS; SUBCUTANEOUS at 17:15

## 2022-01-17 RX ADMIN — BUDESONIDE AND FORMOTEROL FUMARATE DIHYDRATE 2 PUFF(S): 160; 4.5 AEROSOL RESPIRATORY (INHALATION) at 05:51

## 2022-01-17 RX ADMIN — Medication 100 MILLIGRAM(S): at 13:43

## 2022-01-17 RX ADMIN — Medication 100 MILLIGRAM(S): at 05:51

## 2022-01-17 RX ADMIN — Medication 100 MILLIGRAM(S): at 21:54

## 2022-01-17 RX ADMIN — Medication 2: at 08:07

## 2022-01-17 RX ADMIN — CARVEDILOL PHOSPHATE 12.5 MILLIGRAM(S): 80 CAPSULE, EXTENDED RELEASE ORAL at 05:54

## 2022-01-17 RX ADMIN — Medication 150 MILLIGRAM(S): at 11:12

## 2022-01-17 RX ADMIN — PREGABALIN 1000 MICROGRAM(S): 225 CAPSULE ORAL at 11:13

## 2022-01-17 RX ADMIN — PANTOPRAZOLE SODIUM 40 MILLIGRAM(S): 20 TABLET, DELAYED RELEASE ORAL at 05:51

## 2022-01-17 RX ADMIN — ISOSORBIDE MONONITRATE 120 MILLIGRAM(S): 60 TABLET, EXTENDED RELEASE ORAL at 11:12

## 2022-01-17 RX ADMIN — Medication 1 MILLIGRAM(S): at 11:13

## 2022-01-17 RX ADMIN — Medication 2: at 12:26

## 2022-01-17 RX ADMIN — BUDESONIDE AND FORMOTEROL FUMARATE DIHYDRATE 2 PUFF(S): 160; 4.5 AEROSOL RESPIRATORY (INHALATION) at 18:45

## 2022-01-17 RX ADMIN — APIXABAN 5 MILLIGRAM(S): 2.5 TABLET, FILM COATED ORAL at 17:15

## 2022-01-17 RX ADMIN — Medication 1 SPRAY(S): at 11:15

## 2022-01-17 RX ADMIN — FAMOTIDINE 20 MILLIGRAM(S): 10 INJECTION INTRAVENOUS at 11:12

## 2022-01-17 RX ADMIN — AMLODIPINE BESYLATE 10 MILLIGRAM(S): 2.5 TABLET ORAL at 05:51

## 2022-01-17 RX ADMIN — Medication 1 SPRAY(S): at 17:16

## 2022-01-17 RX ADMIN — Medication 5 MILLIGRAM(S): at 17:15

## 2022-01-17 RX ADMIN — Medication 5 MILLIGRAM(S): at 05:51

## 2022-01-17 RX ADMIN — MIRTAZAPINE 30 MILLIGRAM(S): 45 TABLET, ORALLY DISINTEGRATING ORAL at 21:54

## 2022-01-17 RX ADMIN — Medication 81 MILLIGRAM(S): at 11:12

## 2022-01-17 RX ADMIN — APIXABAN 5 MILLIGRAM(S): 2.5 TABLET, FILM COATED ORAL at 05:51

## 2022-01-17 RX ADMIN — ATORVASTATIN CALCIUM 10 MILLIGRAM(S): 80 TABLET, FILM COATED ORAL at 21:54

## 2022-01-17 RX ADMIN — SENNA PLUS 2 TABLET(S): 8.6 TABLET ORAL at 21:54

## 2022-01-17 NOTE — PROGRESS NOTE ADULT - SUBJECTIVE AND OBJECTIVE BOX
Date/Time Patient Seen:  		  Referring MD:   Data Reviewed	       Patient is a 76y old  Male who presents with a chief complaint of CHF exacerbation (16 Jan 2022 13:26)      Subjective/HPI     PAST MEDICAL & SURGICAL HISTORY:  Hypertension    Diabetes mellitus    Lupus    Hepatitis C    Anxiety and depression    CAD (coronary artery disease)  s/p stents    Diverticulosis    Hyperlipidemia    HTN (hypertension)  c/b multiple episodes of hypertensive urgency    HLD (hyperlipidemia)    Atrial fibrillation  first documented on EKG 10/7/2021    CAD (coronary artery disease)  s/p stents (not on plavix)    Type 2 diabetes mellitus  not on home insulin/ Meds    Anxiety  Depression  multiple psych medications    History of diverticulitis  07/2021    Diverticulosis  c/b GIB in 2020    Afib  on AC    Stage 3 chronic kidney disease    Anemia of chronic disease  Monoclonal Gammopathy-MGUS  pRBC transfusion 10/15/21    Chronic diastolic congestive heart failure    Multiple thyroid nodules    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  Had surgery ( April 2013 )    S/P tonsillectomy    S/P arthroscopic knee surgery  Bilateral ( 2005 )    Torsion of testicle  Had surgery at age 14    Pilonidal cyst  Had surgery ( 1969 )    S/P cataract surgery  Bilateral    H/O hernia repair          Medication list         MEDICATIONS  (STANDING):  amLODIPine   Tablet 10 milliGRAM(s) Oral daily  apixaban 5 milliGRAM(s) Oral two times a day  ARIPiprazole 30 milliGRAM(s) Oral daily  aspirin enteric coated 81 milliGRAM(s) Oral daily  atorvastatin 10 milliGRAM(s) Oral at bedtime  budesonide 160 MICROgram(s)/formoterol 4.5 MICROgram(s) Inhaler 2 Puff(s) Inhalation two times a day  carvedilol 12.5 milliGRAM(s) Oral every 12 hours  cloNIDine 0.3 milliGRAM(s) Oral two times a day  cyanocobalamin 1000 MICROGram(s) Oral daily  dextrose 40% Gel 15 Gram(s) Oral once  dextrose 5%. 1000 milliLiter(s) (50 mL/Hr) IV Continuous <Continuous>  dextrose 5%. 1000 milliLiter(s) (100 mL/Hr) IV Continuous <Continuous>  dextrose 50% Injectable 25 Gram(s) IV Push once  dextrose 50% Injectable 12.5 Gram(s) IV Push once  dextrose 50% Injectable 25 Gram(s) IV Push once  epoetin michael-epbx (RETACRIT) Injectable 19179 Unit(s) SubCutaneous <User Schedule>  famotidine    Tablet 20 milliGRAM(s) Oral daily  folic acid 1 milliGRAM(s) Oral daily  glucagon  Injectable 1 milliGRAM(s) IntraMuscular once  hydrALAZINE 100 milliGRAM(s) Oral three times a day  insulin lispro (ADMELOG) corrective regimen sliding scale   SubCutaneous three times a day before meals  insulin lispro (ADMELOG) corrective regimen sliding scale   SubCutaneous at bedtime  isosorbide   mononitrate ER Tablet (IMDUR) 120 milliGRAM(s) Oral daily  lamoTRIgine 100 milliGRAM(s) Oral daily  mirtazapine 30 milliGRAM(s) Oral at bedtime  oxybutynin 5 milliGRAM(s) Oral two times a day  pantoprazole    Tablet 40 milliGRAM(s) Oral before breakfast  senna 2 Tablet(s) Oral at bedtime  sodium chloride 0.65% Nasal 1 Spray(s) Both Nostrils four times a day  venlafaxine XR. 150 milliGRAM(s) Oral daily    MEDICATIONS  (PRN):  acetaminophen     Tablet .. 650 milliGRAM(s) Oral every 6 hours PRN Temp greater or equal to 38.5C (101.3F), Moderate Pain (4 - 6)  ALBUTerol    90 MICROgram(s) HFA Inhaler 2 Puff(s) Inhalation every 6 hours PRN Shortness of Breath and/or Wheezing  LORazepam     Tablet 0.5 milliGRAM(s) Oral daily PRN Anxiety         Vitals log        ICU Vital Signs Last 24 Hrs  T(C): 36.8 (17 Jan 2022 05:06), Max: 36.9 (16 Jan 2022 21:25)  T(F): 98.2 (17 Jan 2022 05:06), Max: 98.4 (16 Jan 2022 21:25)  HR: 77 (17 Jan 2022 05:06) (55 - 77)  BP: 167/83 (17 Jan 2022 05:06) (105/61 - 167/83)  BP(mean): --  ABP: --  ABP(mean): --  RR: 17 (17 Jan 2022 05:06) (16 - 18)  SpO2: 98% (17 Jan 2022 05:06) (94% - 98%)           Input and Output:  I&O's Detail    15 [place] 2022 07:01  -  16 Jan 2022 07:00  --------------------------------------------------------  IN:    Oral Fluid: 480 mL  Total IN: 480 mL    OUT:    Voided (mL): 1550 mL  Total OUT: 1550 mL    Total NET: -1070 mL          Lab Data                        9.1    8.60  )-----------( 207      ( 16 Jan 2022 07:37 )             29.5     01-16    140  |  106  |  35<H>  ----------------------------<  126<H>  3.8   |  30  |  2.10<H>    Ca    8.5      16 Jan 2022 07:37  Mg     2.6     01-15              Review of Systems	      Objective     Physical Examination    heart s1s2  lung dec BS  abd soft  head nc      Pertinent Lab findings & Imaging      Nikko:  NO   Adequate UO     I&O's Detail    15 [place]co 2022 07:01  -  16 Jan 2022 07:00  --------------------------------------------------------  IN:    Oral Fluid: 480 mL  Total IN: 480 mL    OUT:    Voided (mL): 1550 mL  Total OUT: 1550 mL    Total NET: -1070 mL               Discussed with:     Cultures:	        Radiology

## 2022-01-17 NOTE — PROGRESS NOTE ADULT - SUBJECTIVE AND OBJECTIVE BOX
[facility] - Fifi Bliss, Bonifacio, Kalpesh, Fatimah, Loc Rosario  Office Number:  [phone]    Patient resting comfortably in bed in NAD.  Laying flat with no respiratory distress.  No complaints of chest pain, dyspnea, palpitations, PND, or orthopnea.    F/U for:  CHF    MEDICATIONS  (STANDING):  amLODIPine   Tablet 10 milliGRAM(s) Oral daily  apixaban 5 milliGRAM(s) Oral two times a day  ARIPiprazole 30 milliGRAM(s) Oral daily  aspirin enteric coated 81 milliGRAM(s) Oral daily  atorvastatin 10 milliGRAM(s) Oral at bedtime  budesonide 160 MICROgram(s)/formoterol 4.5 MICROgram(s) Inhaler 2 Puff(s) Inhalation two times a day  carvedilol 12.5 milliGRAM(s) Oral every 12 hours  cloNIDine 0.3 milliGRAM(s) Oral two times a day  cyanocobalamin 1000 MICROGram(s) Oral daily  dextrose 40% Gel 15 Gram(s) Oral once  dextrose 5%. 1000 milliLiter(s) (50 mL/Hr) IV Continuous <Continuous>  dextrose 5%. 1000 milliLiter(s) (100 mL/Hr) IV Continuous <Continuous>  dextrose 50% Injectable 25 Gram(s) IV Push once  dextrose 50% Injectable 12.5 Gram(s) IV Push once  dextrose 50% Injectable 25 Gram(s) IV Push once  epoetin michael-epbx (RETACRIT) Injectable 96648 Unit(s) SubCutaneous <User Schedule>  famotidine    Tablet 20 milliGRAM(s) Oral daily  folic acid 1 milliGRAM(s) Oral daily  glucagon  Injectable 1 milliGRAM(s) IntraMuscular once  hydrALAZINE 100 milliGRAM(s) Oral three times a day  insulin lispro (ADMELOG) corrective regimen sliding scale   SubCutaneous at bedtime  insulin lispro (ADMELOG) corrective regimen sliding scale   SubCutaneous three times a day before meals  isosorbide   mononitrate ER Tablet (IMDUR) 120 milliGRAM(s) Oral daily  lamoTRIgine 100 milliGRAM(s) Oral daily  mirtazapine 30 milliGRAM(s) Oral at bedtime  oxybutynin 5 milliGRAM(s) Oral two times a day  pantoprazole    Tablet 40 milliGRAM(s) Oral before breakfast  senna 2 Tablet(s) Oral at bedtime  sodium chloride 0.65% Nasal 1 Spray(s) Both Nostrils four times a day  venlafaxine XR. 150 milliGRAM(s) Oral daily    MEDICATIONS  (PRN):  acetaminophen     Tablet .. 650 milliGRAM(s) Oral every 6 hours PRN Temp greater or equal to 38.5C (101.3F), Moderate Pain (4 - 6)  ALBUTerol    90 MICROgram(s) HFA Inhaler 2 Puff(s) Inhalation every 6 hours PRN Shortness of Breath and/or Wheezing  LORazepam     Tablet 0.5 milliGRAM(s) Oral daily PRN Anxiety      Allergies    No Known Allergies    Intolerances        Vital Signs Last 24 Hrs  T(C): 36.8 (17 Jan 2022 05:06), Max: 36.9 (16 Jan 2022 21:25)  T(F): 98.2 (17 Jan 2022 05:06), Max: 98.4 (16 Jan 2022 21:25)  HR: 77 (17 Jan 2022 05:06) (55 - 77)  BP: 167/83 (17 Jan 2022 05:06) (105/61 - 167/83)  BP(mean): --  RR: 17 (17 Jan 2022 05:06) (16 - 17)  SpO2: 98% (17 Jan 2022 05:06) (94% - 98%)    I&O's Summary    16 Jan 2022 07:01  -  17 Jan 2022 07:00  --------------------------------------------------------  IN: 240 mL / OUT: 700 mL / NET: -460 mL        ON EXAM:    PE deferred secondary to COVID    LABS: All Labs Reviewed:                        9.6    8.94  )-----------( 203      ( 17 Jan 2022 07:04 )             31.1                         9.1    8.60  )-----------( 207      ( 16 Jan 2022 07:37 )             29.5                         9.7    11.02 )-----------( 247      ( 15 [place]co 2022 08:37 )             31.0     17 Jan 2022 07:04    139    |  105    |  34     ----------------------------<  111    3.9     |  30     |  2.10   16 Jan 2022 07:37    140    |  106    |  35     ----------------------------<  126    3.8     |  30     |  2.10   15 [place]co 2022 08:37    138    |  102    |  40     ----------------------------<  195    4.2     |  28     |  2.30     Ca    9.0        17 Jan 2022 07:04  Ca    8.5        16 Jan 2022 07:37  Ca    8.0        15 [place] 2022 08:37  Mg     2.6       15 [place] 2022 08:37

## 2022-01-17 NOTE — PROGRESS NOTE ADULT - SUBJECTIVE AND OBJECTIVE BOX
[facility]  Radu Harrington PA-C  [facility] [place] [address]  [phone]      INTERVAL HPI/OVERNIGHT EVENTS:  Charts reviewed, labs noted  Pt reports he is still having some dysphagia  Able to tolerate diet     MEDICATIONS  (STANDING):  amLODIPine   Tablet 10 milliGRAM(s) Oral daily  apixaban 5 milliGRAM(s) Oral two times a day  ARIPiprazole 30 milliGRAM(s) Oral daily  aspirin enteric coated 81 milliGRAM(s) Oral daily  atorvastatin 10 milliGRAM(s) Oral at bedtime  budesonide 160 MICROgram(s)/formoterol 4.5 MICROgram(s) Inhaler 2 Puff(s) Inhalation two times a day  carvedilol 12.5 milliGRAM(s) Oral every 12 hours  cloNIDine 0.3 milliGRAM(s) Oral two times a day  cyanocobalamin 1000 MICROGram(s) Oral daily  dextrose 40% Gel 15 Gram(s) Oral once  dextrose 5%. 1000 milliLiter(s) (50 mL/Hr) IV Continuous <Continuous>  dextrose 5%. 1000 milliLiter(s) (100 mL/Hr) IV Continuous <Continuous>  dextrose 50% Injectable 25 Gram(s) IV Push once  dextrose 50% Injectable 12.5 Gram(s) IV Push once  dextrose 50% Injectable 25 Gram(s) IV Push once  epoetin michael-epbx (RETACRIT) Injectable 66774 Unit(s) SubCutaneous <User Schedule>  famotidine    Tablet 20 milliGRAM(s) Oral daily  folic acid 1 milliGRAM(s) Oral daily  glucagon  Injectable 1 milliGRAM(s) IntraMuscular once  hydrALAZINE 100 milliGRAM(s) Oral three times a day  insulin lispro (ADMELOG) corrective regimen sliding scale   SubCutaneous at bedtime  insulin lispro (ADMELOG) corrective regimen sliding scale   SubCutaneous three times a day before meals  isosorbide   mononitrate ER Tablet (IMDUR) 120 milliGRAM(s) Oral daily  lamoTRIgine 100 milliGRAM(s) Oral daily  mirtazapine 30 milliGRAM(s) Oral at bedtime  oxybutynin 5 milliGRAM(s) Oral two times a day  pantoprazole    Tablet 40 milliGRAM(s) Oral before breakfast  senna 2 Tablet(s) Oral at bedtime  sodium chloride 0.65% Nasal 1 Spray(s) Both Nostrils four times a day  venlafaxine XR. 150 milliGRAM(s) Oral daily    MEDICATIONS  (PRN):  acetaminophen     Tablet .. 650 milliGRAM(s) Oral every 6 hours PRN Temp greater or equal to 38.5C (101.3F), Moderate Pain (4 - 6)  ALBUTerol    90 MICROgram(s) HFA Inhaler 2 Puff(s) Inhalation every 6 hours PRN Shortness of Breath and/or Wheezing  LORazepam     Tablet 0.5 milliGRAM(s) Oral daily PRN Anxiety      Allergies:  No Known Allergies    PHYSICAL EXAM:   Vital Signs:  Vital Signs Last 24 Hrs  T(C): 36.8 (2022 05:06), Max: 36.9 (2022 21:25)  T(F): 98.2 (2022 05:06), Max: 98.4 (2022 21:25)  HR: 77 (2022 05:06) (55 - 77)  BP: 167/83 (2022 05:06) (105/61 - 167/83)  BP(mean): --  RR: 17 (2022 05:06) (16 - 17)  SpO2: 98% (2022 05:06) (94% - 98%)  Daily     Daily Weight in k.9 (2022 06:16)    GENERAL:  Appears stated age,   HEENT:  NC/AT,    CHEST:  Full & symmetric excursion,   HEART:  Regular rhythm,  ABDOMEN:  Soft, non-tender, non-distended,  EXTEREMITIES:  no cyanosis  SKIN:  No rash  NEURO:  Alert,       LABS:                        9.6    8.94  )-----------( 203      ( 2022 07:04 )             31.1     -    139  |  105  |  34<H>  ----------------------------<  111<H>  3.9   |  30  |  2.10<H>    Ca    9.0      2022 07:04

## 2022-01-17 NOTE — PROGRESS NOTE ADULT - ASSESSMENT
76M from group home, with PMH of DM2, HTN, HLD, CAD (s/p stents), Afib (on Eliquis), stage 3 CKD, hx of MGUS; admitted for acute hypoxic and hypercarbic respiratory failure due to COVID-19 PNA, and acute on chronic diastolic CHF.    #Acute hypoxic and hypercarbic respiratory failure due to COVID19 PNA   -Completed Remdesivir x 5 days. Continue Decadron x 10 days  -Spo2 appropriate on RA  -Isolation precautions per protocol, strict PPE  -Pulm and ID consulted recommendations appreciated    #Acute on chronic diastolic CHF exacerbation   -s/p IV Lasix. Off diuretics now due to ARIELLA  -Daily weights, keep net negative.  -Cardio consulted recommendations appreciated    #Hypertensive urgency  #CAD  #HLD  -SBP improving  -Continue ASA  -Continue Coreg, Clonidine, Norvasc, Hydralazine, Imdur  -[facility] recs appreciated  -Monitor vitals     #Acute epistaxis - resolved  -Likely was mucosal bleed from drying O2 and bled longer due to Eliquis, ASA, and elevated BUN  -ENT consulted, recommendations appreciated. Controlled after Afrin/lidocaine soaked cotton with pressure for 5 minutes     #ARIELLA on CKD Stage 3  -Avoid nephrotoxic medications  -Off Lasix  -Renal Dr Rodarte consulted recommendations appreciated    #Anemia  -Iron deficiency anemia - s/p IV Venofer x 3 doses   -No overt signs of bleeding   -Continue Retacrit  -Continue B12, folic acid  -Follow up AM CBC    #Paroxysmal a fib  -Continue rate control with Coreg  -Continue AC with Eliquis    #Type 2 DM  -A1C 6.2  -Continue moderate dose insulin sliding scale, hypoglycemia protocol, accu-checks  -Blood glucose goal 100-180 in hospital setting    #Anxiety with depression  -Continue Abilify, Lamictal, Effexor, Remeron     #Dysphagia:  s/p Modified barium swallow study and now on Consistent carb/DASH diet with mildly thick liquids.  Pt. has been having intermittent episodes of feeling food getting stuck and N/V.  He was offered GI consultation/evaluation but initially  declined.  Continue Pepcid, Protonix. GI consulted, recommendations appreciated     Disp: per SW, pt's group home cannot accept him if he is COVID positive.  Awaiting repeat COVID PCR result.     76M from group home, with PMH of DM2, HTN, HLD, CAD (s/p stents), Afib (on Eliquis), stage 3 CKD, hx of MGUS; admitted for acute hypoxic and hypercarbic respiratory failure due to COVID-19 PNA, and acute on chronic diastolic CHF.    #Acute hypoxic and hypercarbic respiratory failure due to COVID19 PNA   -Completed Remdesivir x 5 days. Continue Decadron x 10 days  -Spo2 appropriate on RA  -Isolation precautions per protocol, strict PPE  -Pulm and ID consulted recommendations appreciated    #Acute on chronic diastolic CHF exacerbation   -s/p IV Lasix. Off diuretics now due to ARIELLA  -Daily weights, keep net negative.  -Cardio consulted recommendations appreciated    #Hypertensive urgency  #CAD  #HLD  -SBP improving  -Continue ASA  -Continue Coreg, Clonidine, Norvasc, Hydralazine, Imdur  -Cardio ([facility] group), recs appreciated  -Monitor vitals     #Acute epistaxis - resolved  -Likely was mucosal bleed from drying O2 and bled longer due to Eliquis, ASA, and elevated BUN  -ENT consulted, recommendations appreciated. Controlled after Afrin/lidocaine soaked cotton with pressure for 5 minutes     #ARIELLA on CKD Stage 3  -Avoid nephrotoxic medications  -Off Lasix  -Renal Dr Rodarte consulted recommendations appreciated    #Anemia  -Iron deficiency anemia - s/p IV Venofer x 3 doses   -No overt signs of bleeding   -Continue Retacrit  -Continue B12, folic acid  -Follow up AM CBC    #Paroxysmal a fib  -Continue rate control with Coreg  -Continue AC with Eliquis    #Type 2 DM  -A1C 6.2  -Continue moderate dose insulin sliding scale, hypoglycemia protocol, accu-checks  -Blood glucose goal 100-180 in hospital setting    #Anxiety with depression  -Continue Abilify, Lamictal, Effexor, Remeron     #Dysphagia:  s/p Modified barium swallow study and now on Consistent carb/DASH diet with mildly thick liquids.  Pt. has been having intermittent episodes of feeling food getting stuck and N/V.  He was offered GI consultation/evaluation but initially  declined.  Continue Pepcid, Protonix. GI consulted, recommendations appreciated     Disp: per SW, pt's group home cannot accept him if he is COVID positive.  Repeat COVID PCR positive 1/17

## 2022-01-17 NOTE — PROGRESS NOTE ADULT - ASSESSMENT
Dysphagia    work up reviewed  aspiration precautions   states he can tolerate current diet  continue famotidine    Advanced care planning was discussed with patient and family.  Advanced care planning forms were reviewed and discussed.  Risks, benefits and alternatives of gastroenterologic procedures were discussed in detail and all questions were answered.    30 minutes spent.

## 2022-01-17 NOTE — PROGRESS NOTE ADULT - ASSESSMENT
75 yo M with PMH AFib on Eliquis of Type 2 DM (not on insulin), BPH, CAD s/p stents >4 years ago (not on Plavix), diverticulitis (hospitalized 07/2020 at [facility]), GIB 2/2 diverticulosis (2020), anxiety, Lupus, HTN, HLD, CKD stage 3, HepC, hx of blood clots in brain (s/p surgery 2013) [facility] with shortness of breath and leg swelling. Found to be covid positive. Cardio consulted for elevated Troponins and CHF.    TTE:(10/21): Normal LV size with normal LVSF with LVEF 55-60%. LV diastolic function cannot determine due to atrial fibrillation. Mild mitral regurgitation is present. Mild aortic stenosis is  present. Mild tricuspid regurgitation is present . No evidence of any pulmonary hypertension    Volume overload (acute on chronic diastolic HF), CAD, HTN, Afib  - Compensated from HF standpoint.  Tolerating RA.  Will hold Lasix for now.  Creatinine plateaued    - Renal following  - Continue to monitor strict I/O's  - Troponin likely demand ischemia in setting of above; plan for outpatient stress.  No anginal symptoms  - EKG NSR, no ischemic changes noted.    - Hx of Afib, continue home Eliquis.  Rate-controlled.  Continue BB   - Monitor and replete electrolytes. Keep K>4.0 and Mg>2.0.  - For Hx of CAD, continue ASA, BB, and statin  - BP stable and controlled for the most part.  Continue  Norvasc, Imdur, Hydralazine, Clonidine and Coreg     D/C planning per primary team  Will continue to follow

## 2022-01-17 NOTE — PROGRESS NOTE ADULT - SUBJECTIVE AND OBJECTIVE BOX
Patient is a 76y old  Male who presents with a chief complaint of CHF exacerbation (17 Jan 2022 13:01)      INTERVAL HPI/OVERNIGHT EVENTS: Patient seen and examined at bedside. No overnight events.    MEDICATIONS  (STANDING):  amLODIPine   Tablet 10 milliGRAM(s) Oral daily  apixaban 5 milliGRAM(s) Oral two times a day  ARIPiprazole 30 milliGRAM(s) Oral daily  aspirin enteric coated 81 milliGRAM(s) Oral daily  atorvastatin 10 milliGRAM(s) Oral at bedtime  budesonide 160 MICROgram(s)/formoterol 4.5 MICROgram(s) Inhaler 2 Puff(s) Inhalation two times a day  carvedilol 12.5 milliGRAM(s) Oral every 12 hours  cloNIDine 0.3 milliGRAM(s) Oral two times a day  cyanocobalamin 1000 MICROGram(s) Oral daily  dextrose 40% Gel 15 Gram(s) Oral once  dextrose 5%. 1000 milliLiter(s) (50 mL/Hr) IV Continuous <Continuous>  dextrose 5%. 1000 milliLiter(s) (100 mL/Hr) IV Continuous <Continuous>  dextrose 50% Injectable 25 Gram(s) IV Push once  dextrose 50% Injectable 12.5 Gram(s) IV Push once  dextrose 50% Injectable 25 Gram(s) IV Push once  epoetin michael-epbx (RETACRIT) Injectable 74263 Unit(s) SubCutaneous <User Schedule>  famotidine    Tablet 20 milliGRAM(s) Oral daily  folic acid 1 milliGRAM(s) Oral daily  glucagon  Injectable 1 milliGRAM(s) IntraMuscular once  hydrALAZINE 100 milliGRAM(s) Oral three times a day  insulin lispro (ADMELOG) corrective regimen sliding scale   SubCutaneous at bedtime  insulin lispro (ADMELOG) corrective regimen sliding scale   SubCutaneous three times a day before meals  isosorbide   mononitrate ER Tablet (IMDUR) 120 milliGRAM(s) Oral daily  lamoTRIgine 100 milliGRAM(s) Oral daily  mirtazapine 30 milliGRAM(s) Oral at bedtime  oxybutynin 5 milliGRAM(s) Oral two times a day  pantoprazole    Tablet 40 milliGRAM(s) Oral before breakfast  senna 2 Tablet(s) Oral at bedtime  sodium chloride 0.65% Nasal 1 Spray(s) Both Nostrils four times a day  venlafaxine XR. 150 milliGRAM(s) Oral daily    MEDICATIONS  (PRN):  acetaminophen     Tablet .. 650 milliGRAM(s) Oral every 6 hours PRN Temp greater or equal to 38.5C (101.3F), Moderate Pain (4 - 6)  ALBUTerol    90 MICROgram(s) HFA Inhaler 2 Puff(s) Inhalation every 6 hours PRN Shortness of Breath and/or Wheezing  LORazepam     Tablet 0.5 milliGRAM(s) Oral daily PRN Anxiety      Allergies    No Known Allergies    Intolerances    Vital Signs Last 24 Hrs  T(C): 36.7 (17 Jan 2022 13:00), Max: 36.9 (16 Jan 2022 21:25)  T(F): 98 (17 Jan 2022 13:00), Max: 98.4 (16 Jan 2022 21:25)  HR: 59 (17 Jan 2022 13:00) (59 - 77)  BP: 112/64 (17 Jan 2022 13:00) (105/61 - 167/83)  BP(mean): --  RR: 17 (17 Jan 2022 13:00) (17 - 17)  SpO2: 93% (17 Jan 2022 13:00) (93% - 98%)    PHYSICAL EXAM:  GENERAL: NAD  HEENT:  anicteric, moist mucous membranes  CHEST/LUNG:  CTA b/l, no rales, wheezes, or rhonchi  HEART:  RRR, S1, S2  ABDOMEN:  BS+, soft, nontender, nondistended  EXTREMITIES: no edema, cyanosis, or calf tenderness  NERVOUS SYSTEM: answers questions and follows commands appropriately, moves all extremities    LABS:                        9.6    8.94  )-----------( 203      ( 17 Jan 2022 07:04 )             31.1     01-17    139  |  105  |  34  ----------------------------<  111  3.9   |  30  |  2.10    Ca    9.0      17 Jan 2022 07:04  Mg     2.6     01-15          eGFR if Non African American: 30 mL/min/1.73M2 (01-17-22 @ 07:04)  eGFR if African American: 35 mL/min/1.73M2 (01-17-22 @ 07:04)        POCT Blood Glucose.: 185 mg/dL (17 Jan 2022 11:55)  POCT Blood Glucose.: 151 mg/dL (17 Jan 2022 07:59)  POCT Blood Glucose.: 205 mg/dL (16 Jan 2022 20:57)  POCT Blood Glucose.: 112 mg/dL (16 Jan 2022 16:51)          COVID-19 PCR: Detected (01-17-22 @ 09:16)  COVID-19 PCR: Detected (01-15-22 @ 07:40)  COVID-19 PCR: Detected (01-12-22 @ 07:52)  COVID-19 PCR: Detected (01-10-22 @ 07:47)          RADIOLOGY & ADDITIONAL TESTS: Personally reviewed.     Consultant(s) Notes Reviewed:  [x] YES  [ ] NO   Discussed with KIERA/MARLENE, RN

## 2022-01-17 NOTE — PROGRESS NOTE ADULT - ASSESSMENT
75 yo M PMH of A fib (on Eliquis) , MGUS s/p PRBC transfusion 10/21, anxiety/depression, CAD s/p stents (not on plavix), CHF, Diverticulosis, HLD, HTN, thyroid nodules, CKD stage 3, DM presents to the ED with SOB    GI consult noted  Swallow study noted  Covid positive - [place] 15 - awaiting neg result to return to Group Home  on RA  s/p LASIX  completed ABX  DC planning - home -   vs noted  labs reviewed    Previous echo 10/21 showed: Normal LV size with normal LV systolic function with estimated LVEF 55-60%. LV diastolic function cannot determine due to atrial fibrillation  Obesity - exam and risk factors - and features - c/w ELMER - outpatient eval  AF - on AC -   D dimer noted - serial D dimer  cvs rx regimen - diuresis - cxr and proBNP c/w Vol Overload - Cardio eval in progress - old records and TTE reviewed - I and O, serial CE -   dietary discretion  Covid - in vaccinated pt - monitor VS and Sat - Acap and Robitussin PRN - monitor VS and HD and Sat - Decadron is indicated in covid with hypoxemia  pt is on Home Rx regimen of Symbicort - unclear indication - ex smoker - no history of COPD as per pt or medical record - prior CT without Emphysema  isolation for covid -

## 2022-01-18 ENCOUNTER — TRANSCRIPTION ENCOUNTER (OUTPATIENT)
Age: 77
End: 2022-01-18

## 2022-01-18 VITALS — DIASTOLIC BLOOD PRESSURE: 66 MMHG | SYSTOLIC BLOOD PRESSURE: 108 MMHG | HEART RATE: 59 BPM

## 2022-01-18 PROCEDURE — 86900 BLOOD TYPING SEROLOGIC ABO: CPT

## 2022-01-18 PROCEDURE — 82607 VITAMIN B-12: CPT

## 2022-01-18 PROCEDURE — 94640 AIRWAY INHALATION TREATMENT: CPT

## 2022-01-18 PROCEDURE — 87635 SARS-COV-2 COVID-19 AMP PRB: CPT

## 2022-01-18 PROCEDURE — 86901 BLOOD TYPING SEROLOGIC RH(D): CPT

## 2022-01-18 PROCEDURE — 82803 BLOOD GASES ANY COMBINATION: CPT

## 2022-01-18 PROCEDURE — 84100 ASSAY OF PHOSPHORUS: CPT

## 2022-01-18 PROCEDURE — 85027 COMPLETE CBC AUTOMATED: CPT

## 2022-01-18 PROCEDURE — 83605 ASSAY OF LACTIC ACID: CPT

## 2022-01-18 PROCEDURE — 99285 EMERGENCY DEPT VISIT HI MDM: CPT | Mod: 25

## 2022-01-18 PROCEDURE — 85025 COMPLETE CBC W/AUTO DIFF WBC: CPT

## 2022-01-18 PROCEDURE — 87186 SC STD MICRODIL/AGAR DIL: CPT

## 2022-01-18 PROCEDURE — 71045 X-RAY EXAM CHEST 1 VIEW: CPT

## 2022-01-18 PROCEDURE — 87899 AGENT NOS ASSAY W/OPTIC: CPT

## 2022-01-18 PROCEDURE — 80053 COMPREHEN METABOLIC PANEL: CPT

## 2022-01-18 PROCEDURE — 92610 EVALUATE SWALLOWING FUNCTION: CPT

## 2022-01-18 PROCEDURE — U0005: CPT

## 2022-01-18 PROCEDURE — 87637 SARSCOV2&INF A&B&RSV AMP PRB: CPT

## 2022-01-18 PROCEDURE — 81001 URINALYSIS AUTO W/SCOPE: CPT

## 2022-01-18 PROCEDURE — 86850 RBC ANTIBODY SCREEN: CPT

## 2022-01-18 PROCEDURE — 85014 HEMATOCRIT: CPT

## 2022-01-18 PROCEDURE — U0003: CPT

## 2022-01-18 PROCEDURE — 93005 ELECTROCARDIOGRAM TRACING: CPT

## 2022-01-18 PROCEDURE — 86140 C-REACTIVE PROTEIN: CPT

## 2022-01-18 PROCEDURE — 92611 MOTION FLUOROSCOPY/SWALLOW: CPT

## 2022-01-18 PROCEDURE — 82728 ASSAY OF FERRITIN: CPT

## 2022-01-18 PROCEDURE — 87449 NOS EACH ORGANISM AG IA: CPT

## 2022-01-18 PROCEDURE — 83036 HEMOGLOBIN GLYCOSYLATED A1C: CPT

## 2022-01-18 PROCEDURE — 83880 ASSAY OF NATRIURETIC PEPTIDE: CPT

## 2022-01-18 PROCEDURE — 82962 GLUCOSE BLOOD TEST: CPT

## 2022-01-18 PROCEDURE — 87077 CULTURE AEROBIC IDENTIFY: CPT

## 2022-01-18 PROCEDURE — 93306 TTE W/DOPPLER COMPLETE: CPT

## 2022-01-18 PROCEDURE — 87086 URINE CULTURE/COLONY COUNT: CPT

## 2022-01-18 PROCEDURE — 85610 PROTHROMBIN TIME: CPT

## 2022-01-18 PROCEDURE — A9698: CPT

## 2022-01-18 PROCEDURE — 83550 IRON BINDING TEST: CPT

## 2022-01-18 PROCEDURE — 36415 COLL VENOUS BLD VENIPUNCTURE: CPT

## 2022-01-18 PROCEDURE — 82746 ASSAY OF FOLIC ACID SERUM: CPT

## 2022-01-18 PROCEDURE — 87040 BLOOD CULTURE FOR BACTERIA: CPT

## 2022-01-18 PROCEDURE — 85730 THROMBOPLASTIN TIME PARTIAL: CPT

## 2022-01-18 PROCEDURE — 87641 MR-STAPH DNA AMP PROBE: CPT

## 2022-01-18 PROCEDURE — 84484 ASSAY OF TROPONIN QUANT: CPT

## 2022-01-18 PROCEDURE — 84145 PROCALCITONIN (PCT): CPT

## 2022-01-18 PROCEDURE — 87640 STAPH A DNA AMP PROBE: CPT

## 2022-01-18 PROCEDURE — 83540 ASSAY OF IRON: CPT

## 2022-01-18 PROCEDURE — 74230 X-RAY XM SWLNG FUNCJ C+: CPT

## 2022-01-18 PROCEDURE — 94760 N-INVAS EAR/PLS OXIMETRY 1: CPT

## 2022-01-18 PROCEDURE — 99233 SBSQ HOSP IP/OBS HIGH 50: CPT

## 2022-01-18 PROCEDURE — 84443 ASSAY THYROID STIM HORMONE: CPT

## 2022-01-18 PROCEDURE — 85018 HEMOGLOBIN: CPT

## 2022-01-18 PROCEDURE — 99232 SBSQ HOSP IP/OBS MODERATE 35: CPT

## 2022-01-18 PROCEDURE — 85379 FIBRIN DEGRADATION QUANT: CPT

## 2022-01-18 PROCEDURE — 83735 ASSAY OF MAGNESIUM: CPT

## 2022-01-18 PROCEDURE — 80048 BASIC METABOLIC PNL TOTAL CA: CPT

## 2022-01-18 RX ORDER — METFORMIN HYDROCHLORIDE 850 MG/1
1 TABLET ORAL
Qty: 60 | Refills: 0
Start: 2022-01-18 | End: 2022-02-16

## 2022-01-18 RX ORDER — ACETAMINOPHEN 500 MG
2 TABLET ORAL
Qty: 0 | Refills: 0 | DISCHARGE
Start: 2022-01-18

## 2022-01-18 RX ADMIN — Medication 100 MILLIGRAM(S): at 05:40

## 2022-01-18 RX ADMIN — LAMOTRIGINE 100 MILLIGRAM(S): 25 TABLET, ORALLY DISINTEGRATING ORAL at 11:09

## 2022-01-18 RX ADMIN — AMLODIPINE BESYLATE 10 MILLIGRAM(S): 2.5 TABLET ORAL at 05:40

## 2022-01-18 RX ADMIN — Medication 0.3 MILLIGRAM(S): at 05:40

## 2022-01-18 RX ADMIN — Medication 5 MILLIGRAM(S): at 05:41

## 2022-01-18 RX ADMIN — Medication 150 MILLIGRAM(S): at 11:09

## 2022-01-18 RX ADMIN — PANTOPRAZOLE SODIUM 40 MILLIGRAM(S): 20 TABLET, DELAYED RELEASE ORAL at 05:40

## 2022-01-18 RX ADMIN — Medication 1 MILLIGRAM(S): at 11:09

## 2022-01-18 RX ADMIN — Medication 81 MILLIGRAM(S): at 11:09

## 2022-01-18 RX ADMIN — PREGABALIN 1000 MICROGRAM(S): 225 CAPSULE ORAL at 11:09

## 2022-01-18 RX ADMIN — APIXABAN 5 MILLIGRAM(S): 2.5 TABLET, FILM COATED ORAL at 05:40

## 2022-01-18 RX ADMIN — ARIPIPRAZOLE 30 MILLIGRAM(S): 15 TABLET ORAL at 11:09

## 2022-01-18 RX ADMIN — FAMOTIDINE 20 MILLIGRAM(S): 10 INJECTION INTRAVENOUS at 11:09

## 2022-01-18 RX ADMIN — ISOSORBIDE MONONITRATE 120 MILLIGRAM(S): 60 TABLET, EXTENDED RELEASE ORAL at 11:09

## 2022-01-18 RX ADMIN — CARVEDILOL PHOSPHATE 12.5 MILLIGRAM(S): 80 CAPSULE, EXTENDED RELEASE ORAL at 05:41

## 2022-01-18 RX ADMIN — BUDESONIDE AND FORMOTEROL FUMARATE DIHYDRATE 2 PUFF(S): 160; 4.5 AEROSOL RESPIRATORY (INHALATION) at 06:12

## 2022-01-18 RX ADMIN — Medication 4: at 11:59

## 2022-01-18 NOTE — PROGRESS NOTE ADULT - ASSESSMENT
77 yo M PMH of A fib (on Eliquis) , MGUS s/p PRBC transfusion 10/21, anxiety/depression, CAD s/p stents (not on plavix), CHF, Diverticulosis, HLD, HTN, thyroid nodules, CKD stage 3, DM presents to the ED with SOB    GI consult noted  Swallow study noted  Covid positive - Jan 17 - awaiting neg result to return to Group Home  on RA  s/p LASIX  completed ABX  DC planning - home -   vs noted  labs reviewed    Previous echo 10/21 showed: Normal LV size with normal LV systolic function with estimated LVEF 55-60%. LV diastolic function cannot determine due to atrial fibrillation  Obesity - exam and risk factors - and features - c/w ELMER - outpatient eval  AF - on AC -   D dimer noted - serial D dimer  cvs rx regimen - diuresis - cxr and proBNP c/w Vol Overload - Cardio eval in progress - old records and TTE reviewed - I and O, serial CE -   dietary discretion  Covid - in vaccinated pt - monitor VS and Sat - Acap and Robitussin PRN - monitor VS and HD and Sat - Decadron is indicated in covid with hypoxemia  pt is on Home Rx regimen of Symbicort - unclear indication - ex smoker - no history of COPD as per pt or medical record - prior CT without Emphysema  isolation for covid -

## 2022-01-18 NOTE — PROGRESS NOTE ADULT - SUBJECTIVE AND OBJECTIVE BOX
Date/Time Patient Seen:  		  Referring MD:   Data Reviewed	       Patient is a 76y old  Male who presents with a chief complaint of CHF exacerbation (17 Jan 2022 13:01)      Subjective/HPI     PAST MEDICAL & SURGICAL HISTORY:  Hypertension    Diabetes mellitus    Lupus    Hepatitis C    Anxiety and depression    CAD (coronary artery disease)  s/p stents    Diverticulosis    Hyperlipidemia    HTN (hypertension)  c/b multiple episodes of hypertensive urgency    HLD (hyperlipidemia)    Atrial fibrillation  first documented on EKG 10/7/2021    CAD (coronary artery disease)  s/p stents (not on plavix)    Type 2 diabetes mellitus  not on home insulin/ Meds    Anxiety  Depression  multiple psych medications    History of diverticulitis  07/2021    Diverticulosis  c/b GIB in 2020    Afib  on AC    Stage 3 chronic kidney disease    Anemia of chronic disease  Monoclonal Gammopathy-MGUS  pRBC transfusion 10/15/21    Chronic diastolic congestive heart failure    Multiple thyroid nodules    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  Had surgery ( April 2013 )    S/P tonsillectomy    S/P arthroscopic knee surgery  Bilateral ( 2005 )    Torsion of testicle  Had surgery at age 14    Pilonidal cyst  Had surgery ( 1969 )    S/P cataract surgery  Bilateral    H/O hernia repair          Medication list         MEDICATIONS  (STANDING):  amLODIPine   Tablet 10 milliGRAM(s) Oral daily  apixaban 5 milliGRAM(s) Oral two times a day  ARIPiprazole 30 milliGRAM(s) Oral daily  aspirin enteric coated 81 milliGRAM(s) Oral daily  atorvastatin 10 milliGRAM(s) Oral at bedtime  budesonide 160 MICROgram(s)/formoterol 4.5 MICROgram(s) Inhaler 2 Puff(s) Inhalation two times a day  carvedilol 12.5 milliGRAM(s) Oral every 12 hours  cloNIDine 0.3 milliGRAM(s) Oral two times a day  cyanocobalamin 1000 MICROGram(s) Oral daily  dextrose 40% Gel 15 Gram(s) Oral once  dextrose 5%. 1000 milliLiter(s) (50 mL/Hr) IV Continuous <Continuous>  dextrose 5%. 1000 milliLiter(s) (100 mL/Hr) IV Continuous <Continuous>  dextrose 50% Injectable 12.5 Gram(s) IV Push once  dextrose 50% Injectable 25 Gram(s) IV Push once  dextrose 50% Injectable 25 Gram(s) IV Push once  epoetin michael-epbx (RETACRIT) Injectable 56328 Unit(s) SubCutaneous <User Schedule>  famotidine    Tablet 20 milliGRAM(s) Oral daily  folic acid 1 milliGRAM(s) Oral daily  glucagon  Injectable 1 milliGRAM(s) IntraMuscular once  hydrALAZINE 100 milliGRAM(s) Oral three times a day  insulin lispro (ADMELOG) corrective regimen sliding scale   SubCutaneous three times a day before meals  insulin lispro (ADMELOG) corrective regimen sliding scale   SubCutaneous at bedtime  isosorbide   mononitrate ER Tablet (IMDUR) 120 milliGRAM(s) Oral daily  lamoTRIgine 100 milliGRAM(s) Oral daily  mirtazapine 30 milliGRAM(s) Oral at bedtime  oxybutynin 5 milliGRAM(s) Oral two times a day  pantoprazole    Tablet 40 milliGRAM(s) Oral before breakfast  senna 2 Tablet(s) Oral at bedtime  sodium chloride 0.65% Nasal 1 Spray(s) Both Nostrils four times a day  venlafaxine XR. 150 milliGRAM(s) Oral daily    MEDICATIONS  (PRN):  acetaminophen     Tablet .. 650 milliGRAM(s) Oral every 6 hours PRN Temp greater or equal to 38.5C (101.3F), Moderate Pain (4 - 6)  ALBUTerol    90 MICROgram(s) HFA Inhaler 2 Puff(s) Inhalation every 6 hours PRN Shortness of Breath and/or Wheezing  LORazepam     Tablet 0.5 milliGRAM(s) Oral daily PRN Anxiety         Vitals log        ICU Vital Signs Last 24 Hrs  T(C): 36.6 (18 Jan 2022 05:09), Max: 36.8 (17 Jan 2022 20:50)  T(F): 97.8 (18 Jan 2022 05:09), Max: 98.3 (17 Jan 2022 20:50)  HR: 66 (18 Jan 2022 05:09) (59 - 66)  BP: 157/80 (18 Jan 2022 05:09) (112/64 - 157/80)  BP(mean): --  ABP: --  ABP(mean): --  RR: 17 (18 Jan 2022 05:09) (17 - 17)  SpO2: 94% (18 Jan 2022 05:09) (93% - 94%)           Input and Output:  I&O's Detail    16 Jan 2022 07:01  -  17 Jan 2022 07:00  --------------------------------------------------------  IN:    Oral Fluid: 240 mL  Total IN: 240 mL    OUT:    Voided (mL): 700 mL  Total OUT: 700 mL    Total NET: -460 mL      17 Jan 2022 07:01  -  18 Jan 2022 05:25  --------------------------------------------------------  IN:    Oral Fluid: 840 mL  Total IN: 840 mL    OUT:    Voided (mL): 1150 mL  Total OUT: 1150 mL    Total NET: -310 mL          Lab Data                        9.6    8.94  )-----------( 203      ( 17 Jan 2022 07:04 )             31.1     01-17    139  |  105  |  34<H>  ----------------------------<  111<H>  3.9   |  30  |  2.10<H>    Ca    9.0      17 Jan 2022 07:04              Review of Systems	      Objective     Physical Examination    heart s1s2  lung dec BS  abd soft      Pertinent Lab findings & Imaging      Nikko:  NO   Adequate UO     I&O's Detail    16 Jan 2022 07:01  -  17 Jan 2022 07:00  --------------------------------------------------------  IN:    Oral Fluid: 240 mL  Total IN: 240 mL    OUT:    Voided (mL): 700 mL  Total OUT: 700 mL    Total NET: -460 mL      17 Jan 2022 07:01  -  18 Jan 2022 05:25  --------------------------------------------------------  IN:    Oral Fluid: 840 mL  Total IN: 840 mL    OUT:    Voided (mL): 1150 mL  Total OUT: 1150 mL    Total NET: -310 mL               Discussed with:     Cultures:	        Radiology

## 2022-01-18 NOTE — DISCHARGE NOTE NURSING/CASE MANAGEMENT/SOCIAL WORK - PATIENT PORTAL LINK FT
You can access the FollowMyHealth Patient Portal offered by [facility] by registering at the following website: [email or web address]. By joining [facility]’s FollowMyHealth portal, you will also be able to view your health information using other applications (apps) compatible with our system.

## 2022-01-18 NOTE — PROGRESS NOTE ADULT - PROVIDER SPECIALTY LIST ADULT
Cardiology
Hospitalist
Nephrology
Nephrology
Pulmonology
Cardiology
Gastroenterology
Hospitalist
Infectious Disease
Nephrology
Pulmonology
Pulmonology
Cardiology
Hospitalist
Infectious Disease
Infectious Disease
Nephrology
Pulmonology
Cardiology
Gastroenterology
Hospitalist
Infectious Disease
Infectious Disease
Nephrology
Pulmonology
Hospitalist

## 2022-01-18 NOTE — DISCHARGE NOTE NURSING/CASE MANAGEMENT/SOCIAL WORK - NSDCVIVACCINE_GEN_ALL_CORE_FT
Tdap; 24-Jul-2020 09:31; Valdez Watson (RN); [employer]; [id number] (Exp. Date: 17-Sep-2021); IntraMuscular; Deltoid Left.; 0.5 milliLiter(s); VIS (VIS Published: 09-May-2013, VIS Presented: 24-Jul-2020);

## 2022-01-18 NOTE — PROGRESS NOTE ADULT - REASON FOR ADMISSION
CHF exacerbation
acute hypoxic and hypercarbic respiratory failure due to CHF exacerbation and COVID-19 PNA
CHF exacerbation
acute hypoxic and hypercarbic respiratory failure due to CHF exacerbation and COVID-19 PNA

## 2022-01-18 NOTE — PROGRESS NOTE ADULT - SUBJECTIVE AND OBJECTIVE BOX
[facility] -- Fifi Bliss, Kalpesh Valdovinos, Abraham Sheridan Savella  Office # [phone]      Follow Up:    CHF  Subjective/Observations:   No events overnight resting comfortably in bed.  No complaints of chest pain, dyspnea, or palpitations reported. No signs of orthopnea or PND.  Remains on Room Air- awaiting discharge when covid negative     REVIEW OF SYSTEMS: All other review of systems is negative unless indicated above    PAST MEDICAL & SURGICAL HISTORY:  HTN (hypertension)  c/b multiple episodes of hypertensive urgency    HLD (hyperlipidemia)    Atrial fibrillation  first documented on EKG 10/7/2021    CAD (coronary artery disease)  s/p stents (not on plavix)    Type 2 diabetes mellitus  not on home insulin/ Meds    Anxiety  Depression  multiple psych medications    History of diverticulitis  07/2021    Diverticulosis  c/b GIB in 2020    Afib  on AC    Stage 3 chronic kidney disease    Anemia of chronic disease  Monoclonal Gammopathy-MGUS  pRBC transfusion 10/15/21    Chronic diastolic congestive heart failure    Multiple thyroid nodules    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  Had surgery ( April 2013 )    S/P tonsillectomy    S/P arthroscopic knee surgery  Bilateral ( 2005 )    Torsion of testicle  Had surgery at age 14    Pilonidal cyst  Had surgery ( 1969 )    S/P cataract surgery  Bilateral    H/O hernia repair        MEDICATIONS  (STANDING):  amLODIPine   Tablet 10 milliGRAM(s) Oral daily  apixaban 5 milliGRAM(s) Oral two times a day  ARIPiprazole 30 milliGRAM(s) Oral daily  aspirin enteric coated 81 milliGRAM(s) Oral daily  atorvastatin 10 milliGRAM(s) Oral at bedtime  budesonide 160 MICROgram(s)/formoterol 4.5 MICROgram(s) Inhaler 2 Puff(s) Inhalation two times a day  carvedilol 12.5 milliGRAM(s) Oral every 12 hours  cloNIDine 0.3 milliGRAM(s) Oral two times a day  cyanocobalamin 1000 MICROGram(s) Oral daily  dextrose 40% Gel 15 Gram(s) Oral once  dextrose 5%. 1000 milliLiter(s) (50 mL/Hr) IV Continuous <Continuous>  dextrose 5%. 1000 milliLiter(s) (100 mL/Hr) IV Continuous <Continuous>  dextrose 50% Injectable 25 Gram(s) IV Push once  dextrose 50% Injectable 12.5 Gram(s) IV Push once  dextrose 50% Injectable 25 Gram(s) IV Push once  epoetin michael-epbx (RETACRIT) Injectable 63791 Unit(s) SubCutaneous <User Schedule>  famotidine    Tablet 20 milliGRAM(s) Oral daily  folic acid 1 milliGRAM(s) Oral daily  glucagon  Injectable 1 milliGRAM(s) IntraMuscular once  hydrALAZINE 100 milliGRAM(s) Oral three times a day  insulin lispro (ADMELOG) corrective regimen sliding scale   SubCutaneous three times a day before meals  insulin lispro (ADMELOG) corrective regimen sliding scale   SubCutaneous at bedtime  isosorbide   mononitrate ER Tablet (IMDUR) 120 milliGRAM(s) Oral daily  lamoTRIgine 100 milliGRAM(s) Oral daily  mirtazapine 30 milliGRAM(s) Oral at bedtime  oxybutynin 5 milliGRAM(s) Oral two times a day  pantoprazole    Tablet 40 milliGRAM(s) Oral before breakfast  senna 2 Tablet(s) Oral at bedtime  sodium chloride 0.65% Nasal 1 Spray(s) Both Nostrils four times a day  venlafaxine XR. 150 milliGRAM(s) Oral daily    MEDICATIONS  (PRN):  acetaminophen     Tablet .. 650 milliGRAM(s) Oral every 6 hours PRN Temp greater or equal to 38.5C (101.3F), Moderate Pain (4 - 6)  ALBUTerol    90 MICROgram(s) HFA Inhaler 2 Puff(s) Inhalation every 6 hours PRN Shortness of Breath and/or Wheezing  LORazepam     Tablet 0.5 milliGRAM(s) Oral daily PRN Anxiety      Allergies    No Known Allergies    Intolerances        Vital Signs Last 24 Hrs  T(C): 36.6 (18 Jan 2022 05:09), Max: 36.8 (17 Jan 2022 20:50)  T(F): 97.8 (18 Jan 2022 05:09), Max: 98.3 (17 Jan 2022 20:50)  HR: 66 (18 Jan 2022 05:09) (59 - 66)  BP: 157/80 (18 Jan 2022 05:09) (112/64 - 157/80)  BP(mean): --  RR: 17 (18 Jan 2022 05:09) (17 - 17)  SpO2: 94% (18 Jan 2022 05:09) (93% - 94%)    I&O's Summary    17 Jan 2022 07:01  -  18 Jan 2022 07:00  --------------------------------------------------------  IN: 840 mL / OUT: 1150 mL / NET: -310 mL          PHYSICAL EXAM:  TELE: not on tele   Constitutional: NAD, awake and alert, obese   HEENT: Moist Mucous Membranes, Anicteric  Pulmonary: Non-labored, breath sounds are diminished   Cardiovascular: IRRegular, S1 and S2 nl, No murmurs, rubs, gallops or clicks  Gastrointestinal: Bowel Sounds present, soft, nontender.   Lymph: No lymphadenopathy. No peripheral edema.  Skin: No visible rashes or ulcers.  Psych:  Mood & affect appropriate    LABS: All Labs Reviewed:                        9.6    8.94  )-----------( 203      ( 17 Jan 2022 07:04 )             31.1                         9.1    8.60  )-----------( 207      ( 16 Jan 2022 07:37 )             29.5     17 Jan 2022 07:04    139    |  105    |  34     ----------------------------<  111    3.9     |  30     |  2.10   16 Jan 2022 07:37    140    |  106    |  35     ----------------------------<  126    3.8     |  30     |  2.10     Ca    9.0        17 Jan 2022 07:04  Ca    8.5        16 Jan 2022 07:37         ECHO TTE WO CON COMP W DOPP         PROCEDURE DATE:  01/06/2022        INTERPRETATION:  INDICATION: Dyspnea  Sonographer PH    Blood Pressure 177/86    Height 172.7 cm     Weight 104.4 kg    Dimensions:  LA 4.2       Normal Values: 2.0 - 4.0 cm  Ao 3.7        Normal Values: 2.0 - 3.8 cm  SEPTUM 1.4       Normal Values: 0.6 - 1.2 cm  PWT 1.4       Normal Values: 0.6 - 1.1 cm  LVIDd 5.2         Normal Values: 3.0 - 5.6 cm  LVIDs 4.2         Normal Values: 1.8 - 4.0 cm      OBSERVATIONS:  Technically difficult study  Mitral Valve: Mitral annular calcification with thickened leaflets, mild   MR.  Aortic Valve/Aorta: Calcified trileaflet aortic valve with decreased   opening. Peak transaortic valve gradient is 29.4 mmHg with a mean   transaortic valve gradient 14.7 mmHg. This consistent with mild aortic   stenosis.  Tricuspid Valve: Mild TR.  Pulmonic Valve: Not well-visualized  Left Atrium: Enlarged  Right Atrium: Not well-visualized  Left Ventricle: Moderate left ventricular hypertrophy with normal   systolic function, estimated LVEF of 55%.  Right Ventricle: Grossly normal size and systolic function.  Pericardium: no significant pericardial effusion.  Pulmonary/RV Pressure: estimated PA systolic pressure of 32mmHg    IMPRESSION:  Technically difficult study  Moderate left ventricular hypertrophy with normal systolic function,   estimated LVEF of 55%.  Grossly normal RV size and systolic function.  Calcified trileaflet aortic valve with mild aortic stenosis  Mild MR andTR.  No significant pericardial effusion.    --- End of Report ---      VIET GREENWOOD MD; Attending Cardiologist  This document has been electronically signed. Jan 7 2022 11:19AM      ACC: [id number] EXAM:  XR CHEST PORTABLE IMMED 1V                          PROCEDURE DATE:  01/05/2022          INTERPRETATION:  DATE OF STUDY: 1/5/22    PRIOR:12/24/21    CLINICAL INDICATION: New fever.    TECHNIQUE: portable chest.    FINDINGS:  There is stable cardiomegaly.  The left lung is clear.  Partial clearing of previously noted lower right lung airspace haziness.  New airspace opacity in mid-right lung just above right minor fissure.   New airspace opacity in upper right lung -these findings are concerning   for pneumonia right clinical setting.  No sizable pleural effusion. No pneumothorax.  Degenerative changes of the thoracic spine.    IMPRESSION:  Clear left lung.  New probable pneumonic infiltrates in mid and upper right lung.    --- End of Report ---    JOHN MARADIAGA MD; Attending Radiologist  This document has been electronically signed. Jan 5 2022  9:44AM    Ventricular Rate 94 BPM    Atrial Rate 91 BPM    QRS Duration 100 ms    Q-T Interval 378 ms    QTC Calculation(Bazett) 472 ms    R Axis -15 degrees    T Axis 261 degrees    Diagnosis Line Atrial fibrillation  Incomplete right bundle branch block  Moderate voltage criteria for LVH, may be normal variant  Nonspecific T wave abnormality  Prolonged QT  Abnormal ECG

## 2022-01-18 NOTE — PROGRESS NOTE ADULT - ATTENDING COMMENTS
Chart reviewed    Patient seen and examined    Agree with plan as outlined above    77 yo M with PMH AFib on Eliquis of Type 2 DM (not on insulin), BPH, CAD s/p stents >4 years ago (not on Plavix), diverticulitis (hospitalized 07/2020 at [facility]), GIB 2/2 diverticulosis (2020), anxiety, Lupus, HTN, HLD, CKD stage 3, HepC, hx of blood clots in brain (s/p surgery 2013) living in a group home [facility] house presenting to [facility] with shortness of breath and leg swelling. Found to be covid positive. Cardio consulted for elevated Troponins and CHF.    TTE:(10/21): Normal LV size with normal LVSF with LVEF 55-60%. LV diastolic function cannot determine due to atrial fibrillation. Mild mitral regurgitation is present. Mild aortic stenosis is  present. Mild tricuspid regurgitation is present . No evidence of any pulmonary hypertension    Volume overload (acute on chronic diastolic HF), CAD, HTN, Afib  - on PO lasix   - Continue to monitor strict I/O's, negative 250 cc 24 hours   - Troponin likely demand ischemia in setting of above, plan for outpatient stress when underlying COVID  resolves however pt remains asymptomatic   - EKG NSR, no ischemic changes noted  - Hx of Afib continue home Eliquis.  rate-controlled   - For Hx of CAD, continue ASA, BB, and statin  - contnue  Norvasc, Imdur, Hydralazine, Clonidine and coreg
Seen/examined. agree with above.  cont iv lasix and supportive care for covid
Seen/examined. agree with above.  doing well on iv lasix, and continues to improve
Seen/examined. agree with above.  volume status improved, cont with diuretics  bp control  outpatient fu with dr. dozier
Seen/examined. agree with above.  volume status improving with iv lasix  creatinine stable overall
Stable from CV POV . To follow closely while admitted.  Hold Lasix .
-there is no evidence of acute ischemia.  -there is no evidence of significant arrhythmia.  -there is no evidence for meaningful  volume overload.  -awaiting neg covid
better compensated from hf pov. transition to po lasix. cont other cardiac meds. Please continue to maintain strict I/Os, monitor daily weights, Cr, and K. Further cardiac workup will depend on clinical course.
cont lasix. still vol ol. Further cardiac workup will depend on clinical course.
normal ef  no clear vol ol cont po lasix  dc planning
remains with vol ol  cont iv lasix
Patient with acute and decompensated diastolic CHF.  Give BID IV Lasix and maintain neg balance.  Monitor I, O, K, creatinine closely.  Needs supp oxygen to maintain sat greater than 88%.  Also anemic.  Trend H/H. If transfusing PRBC, need to dose extra dose of IV Lasix.  To follow closely while admitted.

## 2022-01-18 NOTE — PROGRESS NOTE ADULT - ASSESSMENT
77 yo M with PMH AFib on Eliquis of Type 2 DM (not on insulin), BPH, CAD s/p stents >4 years ago (not on Plavix), diverticulitis (hospitalized 07/2020 at [facility]), GIB 2/2 diverticulosis (2020), anxiety, Lupus, HTN, HLD, CKD stage 3, HepC, hx of blood clots in brain (s/p surgery 2013) [facility] with shortness of breath and leg swelling. Found to be covid positive. Cardio consulted for elevated Troponins and CHF.    TTE:(10/21): Normal LV size with normal LVSF with LVEF 55-60%. LV diastolic function cannot determine due to atrial fibrillation. Mild mitral regurgitation is present. Mild aortic stenosis is  present. Mild tricuspid regurgitation is present . No evidence of any pulmonary hypertension  Volume overload (acute on chronic diastolic HF), CAD, HTN, Afib  - Compensated from HF standpoint.  Tolerating RA.  lasix on hold, renal following   - Continue to monitor strict I/O's, negative 310 cc 24 hours   - Troponin likely demand ischemia in setting of above; plan for outpatient stress.  No anginal symptoms  - EKG NSR, no ischemic changes noted.    - Hx of Afib, continue home Eliquis.  Rate-controlled.  Continue BB   - Monitor and replete electrolytes. Keep K>4.0 and Mg>2.0.  - For Hx of CAD, continue ASA, BB, and statin  -Continue  Norvasc, Imdur, Hydralazine, Clonidine and Coreg     D/C planning per primary team  Will continue to follow  Carmelina Leger FNP-C  Cardiology NP  [id number] [phone]

## 2022-01-21 ENCOUNTER — INPATIENT (INPATIENT)
Facility: HOSPITAL | Age: 77
LOS: 4 days | Discharge: NURSING, REHAB OR LONG-TERM CARE | DRG: 291 | End: 2022-01-26
Attending: FAMILY MEDICINE | Admitting: INTERNAL MEDICINE
Payer: MEDICARE

## 2022-01-21 VITALS
RESPIRATION RATE: 17 BRPM | HEART RATE: 70 BPM | OXYGEN SATURATION: 96 % | SYSTOLIC BLOOD PRESSURE: 144 MMHG | DIASTOLIC BLOOD PRESSURE: 78 MMHG | HEIGHT: 68 IN | TEMPERATURE: 98 F

## 2022-01-21 DIAGNOSIS — I10 ESSENTIAL (PRIMARY) HYPERTENSION: ICD-10-CM

## 2022-01-21 DIAGNOSIS — L05.91 PILONIDAL CYST WITHOUT ABSCESS: Chronic | ICD-10-CM

## 2022-01-21 DIAGNOSIS — E11.9 TYPE 2 DIABETES MELLITUS WITHOUT COMPLICATIONS: ICD-10-CM

## 2022-01-21 DIAGNOSIS — R06.02 SHORTNESS OF BREATH: ICD-10-CM

## 2022-01-21 DIAGNOSIS — F41.9 ANXIETY DISORDER, UNSPECIFIED: ICD-10-CM

## 2022-01-21 DIAGNOSIS — I48.91 UNSPECIFIED ATRIAL FIBRILLATION: ICD-10-CM

## 2022-01-21 DIAGNOSIS — E78.5 HYPERLIPIDEMIA, UNSPECIFIED: ICD-10-CM

## 2022-01-21 DIAGNOSIS — N44.00 TORSION OF TESTIS, UNSPECIFIED: Chronic | ICD-10-CM

## 2022-01-21 DIAGNOSIS — Z98.49 CATARACT EXTRACTION STATUS, UNSPECIFIED EYE: Chronic | ICD-10-CM

## 2022-01-21 DIAGNOSIS — Z98.89 OTHER SPECIFIED POSTPROCEDURAL STATES: Chronic | ICD-10-CM

## 2022-01-21 DIAGNOSIS — I66.9 OCCLUSION AND STENOSIS OF UNSPECIFIED CEREBRAL ARTERY: Chronic | ICD-10-CM

## 2022-01-21 DIAGNOSIS — I50.9 HEART FAILURE, UNSPECIFIED: ICD-10-CM

## 2022-01-21 DIAGNOSIS — K21.9 GASTRO-ESOPHAGEAL REFLUX DISEASE WITHOUT ESOPHAGITIS: ICD-10-CM

## 2022-01-21 DIAGNOSIS — D64.9 ANEMIA, UNSPECIFIED: ICD-10-CM

## 2022-01-21 DIAGNOSIS — I25.10 ATHEROSCLEROTIC HEART DISEASE OF NATIVE CORONARY ARTERY WITHOUT ANGINA PECTORIS: ICD-10-CM

## 2022-01-21 DIAGNOSIS — Z29.9 ENCOUNTER FOR PROPHYLACTIC MEASURES, UNSPECIFIED: ICD-10-CM

## 2022-01-21 DIAGNOSIS — N18.30 CHRONIC KIDNEY DISEASE, STAGE 3 UNSPECIFIED: ICD-10-CM

## 2022-01-21 DIAGNOSIS — N18.9 CHRONIC KIDNEY DISEASE, UNSPECIFIED: ICD-10-CM

## 2022-01-21 LAB
ALBUMIN SERPL ELPH-MCNC: 2.5 G/DL — LOW (ref 3.3–5)
ALP SERPL-CCNC: 62 U/L — SIGNIFICANT CHANGE UP (ref 40–120)
ALT FLD-CCNC: 24 U/L — SIGNIFICANT CHANGE UP (ref 12–78)
ANION GAP SERPL CALC-SCNC: 6 MMOL/L — SIGNIFICANT CHANGE UP (ref 5–17)
AST SERPL-CCNC: 13 U/L — LOW (ref 15–37)
BASOPHILS # BLD AUTO: 0.02 K/UL — SIGNIFICANT CHANGE UP (ref 0–0.2)
BASOPHILS NFR BLD AUTO: 0.3 % — SIGNIFICANT CHANGE UP (ref 0–2)
BILIRUB SERPL-MCNC: 0.2 MG/DL — SIGNIFICANT CHANGE UP (ref 0.2–1.2)
BUN SERPL-MCNC: 33 MG/DL — HIGH (ref 7–23)
CALCIUM SERPL-MCNC: 8.4 MG/DL — LOW (ref 8.5–10.1)
CHLORIDE SERPL-SCNC: 110 MMOL/L — HIGH (ref 96–108)
CO2 SERPL-SCNC: 26 MMOL/L — SIGNIFICANT CHANGE UP (ref 22–31)
CREAT SERPL-MCNC: 2.4 MG/DL — HIGH (ref 0.5–1.3)
D DIMER BLD IA.RAPID-MCNC: 283 NG/ML DDU — HIGH
EOSINOPHIL # BLD AUTO: 0.13 K/UL — SIGNIFICANT CHANGE UP (ref 0–0.5)
EOSINOPHIL NFR BLD AUTO: 1.9 % — SIGNIFICANT CHANGE UP (ref 0–6)
GLUCOSE SERPL-MCNC: 255 MG/DL — HIGH (ref 70–99)
HCT VFR BLD CALC: 29.4 % — LOW (ref 39–50)
HGB BLD-MCNC: 9.1 G/DL — LOW (ref 13–17)
IMM GRANULOCYTES NFR BLD AUTO: 0.6 % — SIGNIFICANT CHANGE UP (ref 0–1.5)
LYMPHOCYTES # BLD AUTO: 0.71 K/UL — LOW (ref 1–3.3)
LYMPHOCYTES # BLD AUTO: 10.2 % — LOW (ref 13–44)
MCHC RBC-ENTMCNC: 27.1 PG — SIGNIFICANT CHANGE UP (ref 27–34)
MCHC RBC-ENTMCNC: 31 GM/DL — LOW (ref 32–36)
MCV RBC AUTO: 87.5 FL — SIGNIFICANT CHANGE UP (ref 80–100)
MONOCYTES # BLD AUTO: 0.39 K/UL — SIGNIFICANT CHANGE UP (ref 0–0.9)
MONOCYTES NFR BLD AUTO: 5.6 % — SIGNIFICANT CHANGE UP (ref 2–14)
NEUTROPHILS # BLD AUTO: 5.7 K/UL — SIGNIFICANT CHANGE UP (ref 1.8–7.4)
NEUTROPHILS NFR BLD AUTO: 81.4 % — HIGH (ref 43–77)
NRBC # BLD: 0 /100 WBCS — SIGNIFICANT CHANGE UP (ref 0–0)
PLATELET # BLD AUTO: 192 K/UL — SIGNIFICANT CHANGE UP (ref 150–400)
POTASSIUM SERPL-MCNC: 3.6 MMOL/L — SIGNIFICANT CHANGE UP (ref 3.5–5.3)
POTASSIUM SERPL-SCNC: 3.6 MMOL/L — SIGNIFICANT CHANGE UP (ref 3.5–5.3)
PROCALCITONIN SERPL-MCNC: 0.34 NG/ML — HIGH (ref 0–0.04)
PROT SERPL-MCNC: 6.2 G/DL — SIGNIFICANT CHANGE UP (ref 6–8.3)
RAPID RVP RESULT: DETECTED
RBC # BLD: 3.36 M/UL — LOW (ref 4.2–5.8)
RBC # FLD: 17.9 % — HIGH (ref 10.3–14.5)
SARS-COV-2 RNA SPEC QL NAA+PROBE: DETECTED
SODIUM SERPL-SCNC: 142 MMOL/L — SIGNIFICANT CHANGE UP (ref 135–145)
TROPONIN I, HIGH SENSITIVITY RESULT: 57.3 NG/L — SIGNIFICANT CHANGE UP
TROPONIN I, HIGH SENSITIVITY RESULT: 67 NG/L — SIGNIFICANT CHANGE UP
WBC # BLD: 6.99 K/UL — SIGNIFICANT CHANGE UP (ref 3.8–10.5)
WBC # FLD AUTO: 6.99 K/UL — SIGNIFICANT CHANGE UP (ref 3.8–10.5)

## 2022-01-21 PROCEDURE — 99285 EMERGENCY DEPT VISIT HI MDM: CPT

## 2022-01-21 PROCEDURE — 99223 1ST HOSP IP/OBS HIGH 75: CPT | Mod: GC

## 2022-01-21 PROCEDURE — 93010 ELECTROCARDIOGRAM REPORT: CPT

## 2022-01-21 PROCEDURE — 71045 X-RAY EXAM CHEST 1 VIEW: CPT | Mod: 26

## 2022-01-21 RX ORDER — CARVEDILOL PHOSPHATE 80 MG/1
6.25 CAPSULE, EXTENDED RELEASE ORAL EVERY 12 HOURS
Refills: 0 | Status: DISCONTINUED | OUTPATIENT
Start: 2022-01-21 | End: 2022-01-26

## 2022-01-21 RX ORDER — ISOSORBIDE MONONITRATE 60 MG/1
90 TABLET, EXTENDED RELEASE ORAL DAILY
Refills: 0 | Status: DISCONTINUED | OUTPATIENT
Start: 2022-01-21 | End: 2022-01-26

## 2022-01-21 RX ORDER — BUDESONIDE AND FORMOTEROL FUMARATE DIHYDRATE 160; 4.5 UG/1; UG/1
2 AEROSOL RESPIRATORY (INHALATION)
Refills: 0 | Status: DISCONTINUED | OUTPATIENT
Start: 2022-01-21 | End: 2022-01-26

## 2022-01-21 RX ORDER — LACTOBACILLUS ACIDOPHILUS 100MM CELL
1 CAPSULE ORAL DAILY
Refills: 0 | Status: DISCONTINUED | OUTPATIENT
Start: 2022-01-21 | End: 2022-01-26

## 2022-01-21 RX ORDER — ICOSAPENT ETHYL 500 MG/1
2 CAPSULE, LIQUID FILLED ORAL
Qty: 0 | Refills: 0 | DISCHARGE

## 2022-01-21 RX ORDER — HYDRALAZINE HCL 50 MG
100 TABLET ORAL THREE TIMES A DAY
Refills: 0 | Status: DISCONTINUED | OUTPATIENT
Start: 2022-01-21 | End: 2022-01-26

## 2022-01-21 RX ORDER — ACETAMINOPHEN 500 MG
650 TABLET ORAL EVERY 6 HOURS
Refills: 0 | Status: DISCONTINUED | OUTPATIENT
Start: 2022-01-21 | End: 2022-01-26

## 2022-01-21 RX ORDER — OMEPRAZOLE 10 MG/1
1 CAPSULE, DELAYED RELEASE ORAL
Qty: 0 | Refills: 0 | DISCHARGE

## 2022-01-21 RX ORDER — VENLAFAXINE HCL 75 MG
150 CAPSULE, EXT RELEASE 24 HR ORAL DAILY
Refills: 0 | Status: DISCONTINUED | OUTPATIENT
Start: 2022-01-21 | End: 2022-01-26

## 2022-01-21 RX ORDER — DOXAZOSIN MESYLATE 4 MG
4 TABLET ORAL
Refills: 0 | Status: DISCONTINUED | OUTPATIENT
Start: 2022-01-21 | End: 2022-01-26

## 2022-01-21 RX ORDER — LACTOBACILLUS ACIDOPHILUS 100MM CELL
1 CAPSULE ORAL
Qty: 0 | Refills: 0 | DISCHARGE

## 2022-01-21 RX ORDER — LANOLIN ALCOHOL/MO/W.PET/CERES
5 CREAM (GRAM) TOPICAL AT BEDTIME
Refills: 0 | Status: DISCONTINUED | OUTPATIENT
Start: 2022-01-21 | End: 2022-01-26

## 2022-01-21 RX ORDER — FUROSEMIDE 40 MG
40 TABLET ORAL DAILY
Refills: 0 | Status: DISCONTINUED | OUTPATIENT
Start: 2022-01-21 | End: 2022-01-23

## 2022-01-21 RX ORDER — POTASSIUM CHLORIDE 20 MEQ
20 PACKET (EA) ORAL DAILY
Refills: 0 | Status: DISCONTINUED | OUTPATIENT
Start: 2022-01-21 | End: 2022-01-22

## 2022-01-21 RX ORDER — APIXABAN 2.5 MG/1
5 TABLET, FILM COATED ORAL
Refills: 0 | Status: DISCONTINUED | OUTPATIENT
Start: 2022-01-21 | End: 2022-01-26

## 2022-01-21 RX ORDER — FOLIC ACID 0.8 MG
1 TABLET ORAL DAILY
Refills: 0 | Status: DISCONTINUED | OUTPATIENT
Start: 2022-01-21 | End: 2022-01-26

## 2022-01-21 RX ORDER — AMLODIPINE BESYLATE 2.5 MG/1
10 TABLET ORAL DAILY
Refills: 0 | Status: DISCONTINUED | OUTPATIENT
Start: 2022-01-21 | End: 2022-01-26

## 2022-01-21 RX ORDER — FAMOTIDINE 10 MG/ML
20 INJECTION INTRAVENOUS DAILY
Refills: 0 | Status: DISCONTINUED | OUTPATIENT
Start: 2022-01-21 | End: 2022-01-26

## 2022-01-21 RX ORDER — PREGABALIN 225 MG/1
1000 CAPSULE ORAL DAILY
Refills: 0 | Status: DISCONTINUED | OUTPATIENT
Start: 2022-01-21 | End: 2022-01-26

## 2022-01-21 RX ORDER — ASPIRIN/CALCIUM CARB/MAGNESIUM 324 MG
81 TABLET ORAL DAILY
Refills: 0 | Status: DISCONTINUED | OUTPATIENT
Start: 2022-01-21 | End: 2022-01-26

## 2022-01-21 RX ORDER — APIXABAN 2.5 MG/1
5 TABLET, FILM COATED ORAL
Refills: 0 | Status: DISCONTINUED | OUTPATIENT
Start: 2022-01-21 | End: 2022-01-21

## 2022-01-21 RX ORDER — LAMOTRIGINE 25 MG/1
100 TABLET, ORALLY DISINTEGRATING ORAL DAILY
Refills: 0 | Status: DISCONTINUED | OUTPATIENT
Start: 2022-01-21 | End: 2022-01-26

## 2022-01-21 RX ORDER — ATORVASTATIN CALCIUM 80 MG/1
10 TABLET, FILM COATED ORAL AT BEDTIME
Refills: 0 | Status: DISCONTINUED | OUTPATIENT
Start: 2022-01-21 | End: 2022-01-26

## 2022-01-21 RX ADMIN — CARVEDILOL PHOSPHATE 6.25 MILLIGRAM(S): 80 CAPSULE, EXTENDED RELEASE ORAL at 20:02

## 2022-01-21 RX ADMIN — Medication 100 MILLIGRAM(S): at 22:00

## 2022-01-21 RX ADMIN — Medication 0.2 MILLIGRAM(S): at 22:00

## 2022-01-21 RX ADMIN — BUDESONIDE AND FORMOTEROL FUMARATE DIHYDRATE 2 PUFF(S): 160; 4.5 AEROSOL RESPIRATORY (INHALATION) at 20:02

## 2022-01-21 RX ADMIN — Medication 4 MILLIGRAM(S): at 20:02

## 2022-01-21 RX ADMIN — ATORVASTATIN CALCIUM 10 MILLIGRAM(S): 80 TABLET, FILM COATED ORAL at 22:00

## 2022-01-21 RX ADMIN — Medication 100 MILLIGRAM(S): at 15:28

## 2022-01-21 RX ADMIN — APIXABAN 5 MILLIGRAM(S): 2.5 TABLET, FILM COATED ORAL at 20:01

## 2022-01-21 NOTE — H&P ADULT - PROBLEM SELECTOR PLAN 9
presents w Hb 9.1; Baseline ~ 9  - likely 2/2 anemia of chronic dz + hx of MGUS    -Stable   - Monitor for signs and symptoms of bleeding  - Transfuse prn (Hgb>8)   - F/u AM CBC

## 2022-01-21 NOTE — ED PROVIDER NOTE - CARE PLAN
1 Principal Discharge DX:	SOB (shortness of breath)  Secondary Diagnosis:	2019 novel coronavirus disease (COVID-19)

## 2022-01-21 NOTE — H&P ADULT - PROBLEM SELECTOR PLAN 7
Diabetes type II (not on home insulin or oral meds)   on admission; recently completed 10 days of dexamethasone   - Hold home metformin   - Insulin Corrective Scale  - Finger sticks per routine  - Consistent Carb Diet  - A1c: 6.2 01/2022  - Hypoglycemia protocol.

## 2022-01-21 NOTE — H&P ADULT - ATTENDING COMMENTS
78 yo M PMH of A fib (on Eliquis) , MGUS , anxiety/depression, CAD s/p stents (not on plavix), CHF, Diverticulosis, HLD, HTN, thyroid nodules, CKD stage 3, DM2 BIBEMS for worsening sob. Admitted for chf exacerbation.    Patient seen and examined at bedside. He reports progressive dyspnea and increased LE edema. He was just discharged from the hospital earlier in the week. Denies headaches, nausea, vomiting, chest pain, palpitations, abdominal pain, constipation, diarrhea, melena, hematochezia, dysuria.     T(C): 37.1 (01-21-22 @ 14:49), Max: 37.1 (01-21-22 @ 14:49)  HR: 61 (01-21-22 @ 14:49) (61 - 70)  BP: 140/70 (01-21-22 @ 14:49) (140/70 - 144/78)  RR: 18 (01-21-22 @ 14:49) (17 - 18)  SpO2: 99% (01-21-22 @ 14:49) (96% - 99%)  Wt(kg): --    Physical Exam:   GENERAL: well-groomed, well-developed, NAD  HEENT: head NC/AT; EOM intact, conjunctiva & sclera clear; hearing grossly intact, moist mucous membranes  NECK: supple, no JVD  RESPIRATORY: rales b/l lung bases, no wheezing  CARDIOVASCULAR: S1&S2, irreg irreg, no murmurs or gallops  ABDOMEN: soft, non-tender, non-distended, + Bowel sounds x4 quadrants, no guarding, rebound or rigidity  MUSCULOSKELETAL: LE pitting edema 1-2+ b/l  LYMPH: no cervical lymphadenopathy  VASCULAR: Radial pulses 2+ bilaterally, no varicose veins   SKIN: warm and dry, color normal  NEUROLOGIC: AA&O X3, CN2-12 intact w/ no focal deficits, no sensory loss, motor Strength 5/5 in UE & LE B/L  Psych: Normal mood and affect, normal behavior    Plan:  ACute on chronic CHF exacerbation: will diurese with lasix 40mg IV daily.  -monitor volume status closely.   -trend renal fxn  -ekg shows A fib with mild depressions noted laterally, trop negative x1, will trend another set.     COVID 19 infection: no longer considered infectious.     CKD: no evidence of ARIELLA, he has fluctuation of his renal fxn  -Dr Rodarte consulted    Anemia: no signs or symptoms of active bleeding. Continue to monitor hgb.   -hx of MGUS    A fib: continue eliquis  -rate controlled.     DVT ppx on eliquis

## 2022-01-21 NOTE — H&P ADULT - ASSESSMENT
77 yo M PMH of A fib (on Eliquis) , MGUS s/p PRBC transfusion 10/21, anxiety/depression, CAD s/p stents (not on plavix), CHF, Diverticulosis, HLD, HTN, thyroid nodules, CKD stage 3, DM2 BIBEMS for worsening sob. Admitted for chf exacerbation. 78 yo M PMH of A fib (on Eliquis) , MGUS s/p PRBC transfusion 10/21, anxiety/depression, CAD s/p stents (not on plavix), CHF, Diverticulosis, HLD, HTN, thyroid nodules, CKD stage 3, DM2 BIBEMS for worsening sob. Admitted for chf exacerbation.

## 2022-01-21 NOTE — PATIENT PROFILE ADULT - FALL HARM RISK - HARM RISK INTERVENTIONS

## 2022-01-21 NOTE — ED PROVIDER NOTE - CLINICAL SUMMARY MEDICAL DECISION MAKING FREE TEXT BOX
pt with covid admission from 1/3-18 [facility] ems for worsening sob since last night - ekg/xr/labs/o2

## 2022-01-21 NOTE — H&P ADULT - HISTORY OF PRESENT ILLNESS
75 yo M PMH of A fib (on Eliquis) , MGUS s/p PRBC transfusion 10/21, anxiety/depression, CAD s/p stents (not on plavix), CHF, Diverticulosis, HLD, HTN, thyroid nodules, CKD stage 3, DM2 BIBEMS for worsening sob. Pt was recently d/c for acute hypoxic respiratory failure 2/2 covid-19 pneumonia 1/3-1/18. Pt states he has become increasing short of breath since he was d/c 3 days ago. Pt states he has also had mild headaches w dry cough. pt states he has increased anxiety since d/c 3 days ago. Pt denies fevers, chills, chest pain, palpitations, n/v/d/c.    ED:  Vitals: BP: 144/78; HR 70; T 36.9; RR 17; O2 96% on 2L NC   Labs: Hb 9.1; D-dimer 283; BUN/Cr 33/2.40; ; Procal 0.34; covid-19 positive   ECG: Afib @ 6p bpm   Imaging: cxr (my read); worsening R pulm infiltrates compared to prior.          76 yo M PMH of A fib (on Eliquis) , MGUS s/p PRBC transfusion 10/21, anxiety/depression, CAD s/p stents (not on plavix), CHF, Diverticulosis, HLD, HTN, thyroid nodules, CKD stage 3, DM2 BIBEMS for worsening sob. Pt was recently d/c for acute hypoxic respiratory failure 2/2 covid-19 pneumonia 1/3-1/18. Pt states he has become increasing short of breath since he was d/c 3 days ago. Pt states he has also had mild headaches w dry cough. pt states he has increased anxiety since d/c 3 days ago. Pt denies fevers, chills, chest pain, palpitations, n/v/d/c.    ED:  Vitals: BP: 144/78; HR 70; T 36.9; RR 17; O2 96% on 2L NC   Labs: Hb 9.1; D-dimer 283; BUN/Cr 33/2.40; ; Procal 0.34; covid-19 positive   ECG: Afib @ 6p bpm   Imaging: cxr (my read); worsening R pulm infiltrates compared to prior.

## 2022-01-21 NOTE — CONSULT NOTE ADULT - ASSESSMENT
76M from group home, with PMH of DM2, HTN, HLD, CAD (s/p stents), Afib (on Eliquis), stage 3 CKD, hx of MGUS    HFPEF  CKD  Anemia  Ex Smoker  COVID - viral pna  Hypoxemia  Anxious  CAD  AF    remains covid pos -   isolation precs  completed Remdesivir - Decadron  monitor VS and Sat  fio2 support titration - keep sat > 88 pct  optimization of [facility] rx regimen  cxr on admission - 1- - viral pna - worsening when compared to prior films  D dimer - serially - DVT p - on ELIQUIS  Consideration for Judicious Diuresis - As per Renal and Cardio  Anxiolytics  Emotional support

## 2022-01-21 NOTE — H&P ADULT - PROBLEM SELECTOR PLAN 2
CKD stage 3-4, Cr 2.4 on presentation; ?preston on ckd vs progressing ckd   - Avoid nephrotoxic meds  - Trend renal function   - Dr Rodarte consulted.

## 2022-01-21 NOTE — ED PROVIDER NOTE - OBJECTIVE STATEMENT
kashmir ramírez recent covid admission 1/3-18 bib ems for worsening sob since last night. no fevers, chills, cp, abd pain, calf pain, edema. kashmir ramírez recent covid admission 1/3-18 bib ems for worsening sob since last night. no fevers, chills, cp, abd pain, calf pain.

## 2022-01-21 NOTE — H&P ADULT - PROBLEM SELECTOR PLAN 1
-CXR: (my read) worsening R pulm infiltrates compared to prior. f/u official read   -Last echo 01/13/22: Moderate left ventricular hypertrophy with normal systolic function; estimated LVEF of 55%; Calcified trileaflet aortic valve with mild aortic stenosis; Mild MR and TR.  - Pt recently admitted and d/c'ed 1/3-1/18 for acute hypoxic resp failure 2/2 covid pneumonia s/p remdesivir + dexamethasone- pt not considered infectious at this point  - now saturating well on 2L NC   - Trops wnl   - Admit to Tele   -BNP elevated @5773  -strict I/Os  -daily weights  -Dash/ TLC Diet  -Pulm consulted: Dr Day -CXR: (my read) worsening R pulm infiltrates compared to prior. f/u official read   -Last echo 01/13/22: Moderate left ventricular hypertrophy with normal systolic function; estimated LVEF of 55%; Calcified trileaflet aortic valve with mild aortic stenosis; Mild MR and TR.  - Pt recently admitted and d/c'ed 1/3-1/18 for acute hypoxic resp failure 2/2 covid pneumonia s/p remdesivir + dexamethasone; Remains covid positive; pt not considered infectious at this point  - now saturating well on 2L NC   - O2 supplementation prn; keep sat > 88 pct  - Trops wnl   - Admit to Tele   -BNP elevated @5773  -strict I/Os  -daily weights  -Dash/ TLC Diet  -Pulm consulted: Dr Day -CXR: (my read) worsening R pulm infiltrates compared to prior. f/u official read   -Last echo 01/13/22: Moderate left ventricular hypertrophy with normal systolic function; estimated LVEF of 55%; Calcified trileaflet aortic valve with mild aortic stenosis; Mild MR and TR.  - Pt recently admitted and d/c'ed 1/3-1/18 for acute hypoxic resp failure 2/2 covid pneumonia s/p remdesivir + dexamethasone; Remains covid positive; pt not considered infectious at this point  - now saturating well on 2L NC   - O2 supplementation prn; keep sat > 88 pct  - Trops wnl   - Admit to Tele   -BNP elevated @5773  -strict I/Os  -daily weights  -Dash/ TLC Diet  -continue Lasix 40mg IV daily and monitor renal fxn closely

## 2022-01-21 NOTE — H&P ADULT - PROBLEM SELECTOR PLAN 4
Chronic, stable  Continue home Coreg, Isosorbide, Clonidine, Amlodipine, Hydralazine with hold parameters  Vitals per routine, hemodynamically stable.

## 2022-01-21 NOTE — H&P ADULT - NSHPOUTPATIENTPROVIDERS_GEN_ALL_CORE
PCP: Dr Man   Cardio: Dr Curtis   ENdo: Dr Busby   GI: Dr Teran   Psych: Dr Lawrence  Neuro: Dr Rosmery Acosta

## 2022-01-21 NOTE — H&P ADULT - NSHPSOCIALHISTORY_GEN_ALL_CORE
Tobacco: former smoker ~8 pack years, quit 45 years ago   EtOH: denies  recreational drug use: denies  Lives with: group home   Ambulates:  walker   ADLs: independent  Vaccinations: Moderna x2 (May 2021), denies flu vaccine

## 2022-01-21 NOTE — ED ADULT TRIAGE NOTE - CHIEF COMPLAINT QUOTE
As per EMS, dx with covid 6 weeks ago, now SOB, SPO2 91% on room air at group home, placed on nasal canula 2LNC

## 2022-01-21 NOTE — ED ADULT TRIAGE NOTE - ARRIVAL FROM
.PULMONARY DAILY NOTE    Patient: Naveed Hill    : 1985 Attending: Hiram Fitzpatrick MD   32 year old female        Reason for Follow Up:  Critical care management  Altered mental status    Subjective / Overnight events:Pt in bed. She is on 2L NC. Denies sob, cough. Pain to incision site.     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   --s/p  redo sternotomy , mitral valve replacement, tricuspid valve replacement.   Post operative Heart Block currently with epicardial pacer  Acute LE ischemia secondary to septic emboli s/p thrombectomy bilaterally 4/10/17, s/p amputation  Renal, spenic and hepatic emboli via CT this admission without organ dysfunction   Acute kidney injury  Polysubstance abuse with ongoing drug abuse  Cardiomyopathy with elevated BNP  Hx hepatitis C      Plan:  Monitor respiratory status, on 2L NC which pt states is her baseline  Encourage good pulmonary hygiene including incentive spirometry.   Pain management following  EP following, medication management per them. Monitor tele.  ID following, ceftaroline  Vascular surgery following. Bilateral LE amputations .   Wound care following  Chronic issues and home medications per attending.   Supportive care    PULMONARY/CRITICAL CARE ATTENDING  I have independently seen and evaluated the patient, making pertinent adjustments to the note.  I have discussed the case and formulated the above plan with GARETH Carlton.    Stable, on 2L, lungs clear, encourage pulmonary hygiene, EP/ID following  S/p amputations, cont wound care  Supportive care, dispo planning underway    Abimael Andersen MD         Reviewed Pertinent: Allergies, Medical History, Surgical History, Social History, Family History and Medications. All notes in chart reviewed.    Discussed with: patient, RN    PHYSICAL EXAM     Visit Vitals   • /73 (BP Location: LUE, Patient Position:  Sitting)   • Pulse 71   • Temp 97.4 °F (36.3 °C) (Oral)   • Resp 18   • Ht 5' 6\" (1.676 m)   • Wt 72 kg   • LMP  (LMP Unknown)  Comment: Pt altered   • SpO2 97%   • BMI 25.62 kg/m2       GENERAL: awake, in no acute distress  SKIN: BLE edema and ischemia.legs wrapped. Right with ischemia to digits with dressing, Scattered lesions/scabs (see nursing documentation), recent LE amputations site covered in gauze   HEAD: normocephalic, atraumatic  EYES: pupils are equal, sclera and conjunctiva are normal   MOUTH/THROAT: oropharynx appears normal, oral mucosa is unremarkable  NECK: trachea midline, no crepitus  CHEST: respiratory effort is not labored, equal expansion bilaterally. No use of accessory muscles   LUNGS: remains diminished bilaterally, no wheeze or rhonchi  HEART: Regular rate and rhythm, paced,S1 and S2 normal  ABDOMEN: soft, normal active bowel sounds, nontender  NEUROLOGIC: awake, alert, oriented, answers questions   EXTREMITIES: Right fingers ischemic, Bilateral LE amputations  PSYCHIATRIC: calm, cooperative, poor eye contact.    OBJECTIVE DATA     Laboratory Results:  Recent Labs      17   0505   SODIUM  143   POTASSIUM  5.1   CHLORIDE  107   CO2  21   BUN  20   CREATININE  1.74*   GLUCOSE  101*   WBC  10.7   HGB  8.1*   PLT  259       Imagin/27 CXR reviewed       CT angio head and neck:  1.  CTA neck:  No evidence of significant extracranial vascular stenosis or occlusion.  2.  CTA head:  No evidence of hemodynamically significant intracranial stenosis, aneurysm, or vascular malformation.    3.  CT of the head: No acute intracranial findings. No abnormal postcontrast enhancement.    Weight trend:  Weight    17 0515 17 0440 17 0443 17 0422   Weight: 74.9 kg 76 kg 74.1 kg 72 kg         Intake/Output Summary (Last 24 hours) at 17 1141  Last data filed at 17 1000   Gross per 24 hour   Intake             1360 ml   Output              700 ml   Net               660 ml       CURRENT MEDICATIONS     Scheduled Medications:  • ceftaroline (TEFLARO) IVPB  400 mg Intravenous 3 times per day   • famotidine  20 mg Oral Daily   • carvedilol  6.25 mg Oral BID WC   • magnesium lactate  168 mg Oral Daily with breakfast   • iron polysaccharide complex  150 mg Oral BID WC   • sodium chloride (PF)  2 mL Injection 2 times per day   • rifAMPin  600 mg Oral Daily   • furosemide  40 mg Oral Daily   • morphine SR  15 mg Oral 3 times per day   • pregabalin  150 mg Oral TID   • aspirin  81 mg Oral Daily   • docusate sodium-sennosides  2 tablet Oral Daily   • sodium chloride (PF)  10 mL Injection 3 times per day      Adult home

## 2022-01-21 NOTE — H&P ADULT - NSHPREVIEWOFSYSTEMS_GEN_ALL_CORE
REVIEW OF SYSTEMS:    CONSTITUTIONAL: No weakness, fevers or chills  EYES/ENT: No visual changes;  No vertigo or throat pain   NECK: No pain or stiffness  RESPIRATORY: + shortness of breath; + cough; No wheezing, hemoptysis;   CARDIOVASCULAR: No chest pain or palpitations  GASTROINTESTINAL: No abdominal or epigastric pain. No nausea, vomiting, or hematemesis; No diarrhea or constipation. No melena or hematochezia.  GENITOURINARY: No dysuria, frequency or hematuria  NEUROLOGICAL: No numbness or weakness REVIEW OF SYSTEMS:    CONSTITUTIONAL: No weakness, fevers or chills  EYES/ENT: No visual changes;  No vertigo or throat pain   NECK: No pain or stiffness  RESPIRATORY: + shortness of breath; + cough; No wheezing, hemoptysis;   CARDIOVASCULAR: No chest pain or palpitations. increased LE edema  GASTROINTESTINAL: No abdominal or epigastric pain. No nausea, vomiting, or hematemesis; No diarrhea or constipation. No melena or hematochezia.  GENITOURINARY: No dysuria, frequency or hematuria  NEUROLOGICAL: No numbness or weakness

## 2022-01-22 LAB
ALBUMIN SERPL ELPH-MCNC: 2.3 G/DL — LOW (ref 3.3–5)
ALP SERPL-CCNC: 55 U/L — SIGNIFICANT CHANGE UP (ref 40–120)
ALT FLD-CCNC: 22 U/L — SIGNIFICANT CHANGE UP (ref 12–78)
ANION GAP SERPL CALC-SCNC: 6 MMOL/L — SIGNIFICANT CHANGE UP (ref 5–17)
AST SERPL-CCNC: 12 U/L — LOW (ref 15–37)
BASOPHILS # BLD AUTO: 0.01 K/UL — SIGNIFICANT CHANGE UP (ref 0–0.2)
BASOPHILS NFR BLD AUTO: 0.2 % — SIGNIFICANT CHANGE UP (ref 0–2)
BILIRUB SERPL-MCNC: 0.3 MG/DL — SIGNIFICANT CHANGE UP (ref 0.2–1.2)
BUN SERPL-MCNC: 27 MG/DL — HIGH (ref 7–23)
CALCIUM SERPL-MCNC: 8.7 MG/DL — SIGNIFICANT CHANGE UP (ref 8.5–10.1)
CHLORIDE SERPL-SCNC: 109 MMOL/L — HIGH (ref 96–108)
CHOLEST SERPL-MCNC: 99 MG/DL — SIGNIFICANT CHANGE UP
CO2 SERPL-SCNC: 25 MMOL/L — SIGNIFICANT CHANGE UP (ref 22–31)
CREAT SERPL-MCNC: 2.2 MG/DL — HIGH (ref 0.5–1.3)
CRP SERPL-MCNC: 127 MG/L — HIGH
EOSINOPHIL # BLD AUTO: 0.18 K/UL — SIGNIFICANT CHANGE UP (ref 0–0.5)
EOSINOPHIL NFR BLD AUTO: 2.9 % — SIGNIFICANT CHANGE UP (ref 0–6)
FERRITIN SERPL-MCNC: 126 NG/ML — SIGNIFICANT CHANGE UP (ref 30–400)
GLUCOSE SERPL-MCNC: 111 MG/DL — HIGH (ref 70–99)
HCT VFR BLD CALC: 25.6 % — LOW (ref 39–50)
HDLC SERPL-MCNC: 32 MG/DL — LOW
HGB BLD-MCNC: 8 G/DL — LOW (ref 13–17)
IMM GRANULOCYTES NFR BLD AUTO: 0.3 % — SIGNIFICANT CHANGE UP (ref 0–1.5)
LIPID PNL WITH DIRECT LDL SERPL: 54 MG/DL — SIGNIFICANT CHANGE UP
LYMPHOCYTES # BLD AUTO: 1.13 K/UL — SIGNIFICANT CHANGE UP (ref 1–3.3)
LYMPHOCYTES # BLD AUTO: 18.3 % — SIGNIFICANT CHANGE UP (ref 13–44)
MCHC RBC-ENTMCNC: 27 PG — SIGNIFICANT CHANGE UP (ref 27–34)
MCHC RBC-ENTMCNC: 31.3 GM/DL — LOW (ref 32–36)
MCV RBC AUTO: 86.5 FL — SIGNIFICANT CHANGE UP (ref 80–100)
MONOCYTES # BLD AUTO: 0.47 K/UL — SIGNIFICANT CHANGE UP (ref 0–0.9)
MONOCYTES NFR BLD AUTO: 7.6 % — SIGNIFICANT CHANGE UP (ref 2–14)
NEUTROPHILS # BLD AUTO: 4.36 K/UL — SIGNIFICANT CHANGE UP (ref 1.8–7.4)
NEUTROPHILS NFR BLD AUTO: 70.7 % — SIGNIFICANT CHANGE UP (ref 43–77)
NON HDL CHOLESTEROL: 67 MG/DL — SIGNIFICANT CHANGE UP
NRBC # BLD: 0 /100 WBCS — SIGNIFICANT CHANGE UP (ref 0–0)
PLATELET # BLD AUTO: 192 K/UL — SIGNIFICANT CHANGE UP (ref 150–400)
POTASSIUM SERPL-MCNC: 3.6 MMOL/L — SIGNIFICANT CHANGE UP (ref 3.5–5.3)
POTASSIUM SERPL-SCNC: 3.6 MMOL/L — SIGNIFICANT CHANGE UP (ref 3.5–5.3)
PROT SERPL-MCNC: 6 G/DL — SIGNIFICANT CHANGE UP (ref 6–8.3)
RBC # BLD: 2.96 M/UL — LOW (ref 4.2–5.8)
RBC # FLD: 17.9 % — HIGH (ref 10.3–14.5)
SODIUM SERPL-SCNC: 140 MMOL/L — SIGNIFICANT CHANGE UP (ref 135–145)
TRIGL SERPL-MCNC: 68 MG/DL — SIGNIFICANT CHANGE UP
WBC # BLD: 6.17 K/UL — SIGNIFICANT CHANGE UP (ref 3.8–10.5)
WBC # FLD AUTO: 6.17 K/UL — SIGNIFICANT CHANGE UP (ref 3.8–10.5)

## 2022-01-22 PROCEDURE — 99233 SBSQ HOSP IP/OBS HIGH 50: CPT

## 2022-01-22 RX ORDER — ERYTHROPOIETIN 10000 [IU]/ML
10000 INJECTION, SOLUTION INTRAVENOUS; SUBCUTANEOUS
Refills: 0 | Status: DISCONTINUED | OUTPATIENT
Start: 2022-01-22 | End: 2022-01-24

## 2022-01-22 RX ORDER — IRON SUCROSE 20 MG/ML
100 INJECTION, SOLUTION INTRAVENOUS EVERY 24 HOURS
Refills: 0 | Status: COMPLETED | OUTPATIENT
Start: 2022-01-22 | End: 2022-01-24

## 2022-01-22 RX ORDER — FUROSEMIDE 40 MG
40 TABLET ORAL ONCE
Refills: 0 | Status: COMPLETED | OUTPATIENT
Start: 2022-01-22 | End: 2022-01-22

## 2022-01-22 RX ORDER — ERYTHROPOIETIN 10000 [IU]/ML
20000 INJECTION, SOLUTION INTRAVENOUS; SUBCUTANEOUS
Refills: 0 | Status: DISCONTINUED | OUTPATIENT
Start: 2022-01-22 | End: 2022-01-22

## 2022-01-22 RX ORDER — POTASSIUM CHLORIDE 20 MEQ
20 PACKET (EA) ORAL ONCE
Refills: 0 | Status: COMPLETED | OUTPATIENT
Start: 2022-01-22 | End: 2022-01-22

## 2022-01-22 RX ADMIN — Medication 100 MILLIGRAM(S): at 23:18

## 2022-01-22 RX ADMIN — FAMOTIDINE 20 MILLIGRAM(S): 10 INJECTION INTRAVENOUS at 12:08

## 2022-01-22 RX ADMIN — BUDESONIDE AND FORMOTEROL FUMARATE DIHYDRATE 2 PUFF(S): 160; 4.5 AEROSOL RESPIRATORY (INHALATION) at 23:18

## 2022-01-22 RX ADMIN — APIXABAN 5 MILLIGRAM(S): 2.5 TABLET, FILM COATED ORAL at 18:24

## 2022-01-22 RX ADMIN — AMLODIPINE BESYLATE 10 MILLIGRAM(S): 2.5 TABLET ORAL at 06:48

## 2022-01-22 RX ADMIN — Medication 0.2 MILLIGRAM(S): at 06:48

## 2022-01-22 RX ADMIN — Medication 1 TABLET(S): at 12:08

## 2022-01-22 RX ADMIN — LAMOTRIGINE 100 MILLIGRAM(S): 25 TABLET, ORALLY DISINTEGRATING ORAL at 12:08

## 2022-01-22 RX ADMIN — IRON SUCROSE 100 MILLIGRAM(S): 20 INJECTION, SOLUTION INTRAVENOUS at 18:24

## 2022-01-22 RX ADMIN — Medication 81 MILLIGRAM(S): at 12:10

## 2022-01-22 RX ADMIN — Medication 100 MILLIGRAM(S): at 14:39

## 2022-01-22 RX ADMIN — Medication 150 MILLIGRAM(S): at 12:08

## 2022-01-22 RX ADMIN — Medication 20 MILLIEQUIVALENT(S): at 18:25

## 2022-01-22 RX ADMIN — APIXABAN 5 MILLIGRAM(S): 2.5 TABLET, FILM COATED ORAL at 06:48

## 2022-01-22 RX ADMIN — CARVEDILOL PHOSPHATE 6.25 MILLIGRAM(S): 80 CAPSULE, EXTENDED RELEASE ORAL at 06:48

## 2022-01-22 RX ADMIN — Medication 100 MILLIGRAM(S): at 06:48

## 2022-01-22 RX ADMIN — Medication 1 MILLIGRAM(S): at 12:08

## 2022-01-22 RX ADMIN — ATORVASTATIN CALCIUM 10 MILLIGRAM(S): 80 TABLET, FILM COATED ORAL at 23:18

## 2022-01-22 RX ADMIN — Medication 40 MILLIGRAM(S): at 18:24

## 2022-01-22 RX ADMIN — Medication 0.2 MILLIGRAM(S): at 14:39

## 2022-01-22 RX ADMIN — Medication 4 MILLIGRAM(S): at 06:50

## 2022-01-22 RX ADMIN — Medication 650 MILLIGRAM(S): at 06:57

## 2022-01-22 RX ADMIN — Medication 4 MILLIGRAM(S): at 18:25

## 2022-01-22 RX ADMIN — CARVEDILOL PHOSPHATE 6.25 MILLIGRAM(S): 80 CAPSULE, EXTENDED RELEASE ORAL at 18:25

## 2022-01-22 RX ADMIN — ERYTHROPOIETIN 10000 UNIT(S): 10000 INJECTION, SOLUTION INTRAVENOUS; SUBCUTANEOUS at 15:49

## 2022-01-22 RX ADMIN — Medication 40 MILLIGRAM(S): at 06:50

## 2022-01-22 RX ADMIN — ISOSORBIDE MONONITRATE 90 MILLIGRAM(S): 60 TABLET, EXTENDED RELEASE ORAL at 12:08

## 2022-01-22 RX ADMIN — PREGABALIN 1000 MICROGRAM(S): 225 CAPSULE ORAL at 12:08

## 2022-01-22 RX ADMIN — BUDESONIDE AND FORMOTEROL FUMARATE DIHYDRATE 2 PUFF(S): 160; 4.5 AEROSOL RESPIRATORY (INHALATION) at 06:50

## 2022-01-22 NOTE — PROGRESS NOTE ADULT - ASSESSMENT
76M from group home, with PMH of DM2, HTN, HLD, CAD (s/p stents), Afib (on Eliquis), stage 3 CKD, hx of MGUS    HFPEF  CKD  Anemia  Ex Smoker  COVID - viral pna  Hypoxemia  Anxious  CAD  AF    on LASIX -   cxr noted  proBNP noted  renal indices monitoring  cvs rx regimen optimization    remains covid pos -   isolation precs  completed Remdesivir - Decadron  monitor VS and Sat  fio2 support titration - keep sat > 88 pct  optimization of cvs rx regimen  cxr on admission - 1- - viral pna - worsening when compared to prior films  D dimer - serially - DVT p - on ELIQUIS  Consideration for Judicious Diuresis - As per Renal and Cardio  Anxiolytics  Emotional support

## 2022-01-22 NOTE — PHYSICAL THERAPY INITIAL EVALUATION ADULT - ADDITIONAL COMMENTS
Pt states he lives in group home in [facility] with HR, was using RW intermittently and was fully independent prior.

## 2022-01-22 NOTE — PROGRESS NOTE ADULT - ASSESSMENT
77yo M from group home, with PMH of DM2, HTN, HLD, CAD (s/p stents), Afib (on Eliquis), stage 3 CKD, hx of MGUS; recent admission for COVID PNA and ADHF, a/w acute hypoxic respiratory failure due to acute on chronic diastolic CHF.    Acute hypoxic respiratory failure due to acute on chronic diastolic CHF:  -of note pt had recent admission with COVID and ADHF earlier this month - completed remdesivir and decadron course on that admission  -pt admits to using salt in his diet, but states he has been taking his diuretics at home - counseled on low salt intake  -lasix 40mg IV daily (will give another dose of lasix 40mg IV this evening to be q12h today and reassess renal function tomorrow)  -on NC -- wean off as tolerated  -airborne/contact precautions  -trending CBC diff, CMP, and  inflammatory markers  -Pulm following ([facility]), recs appreciated  -consult cardio ([facility]), f/up recs    HTN:  - continue coreg, clonidine, norvasc, hydralazine, lasix  - monitor BP    ARIELLA on CKD 3:  - baseline Cr appears to be ~2.0-2.2 - renally dose meds, no nephrotoxics  - monitor closely on lasix  - nephro ([facility]), recs appreciated    Anemia:  - pt with hx of MGUS and CKD likely contributing to anemia, along with current acute illness  - Hgb has trended down to 8.0  - no gross hematuria, hematochezia, melena; monitor for signs of blood loss  - Retacrit per nephro orders for the renal failure component of anemia   - likely will benefit from venofer 100mg IV q24h x3 doses as ferritin relatively low for current CKD and recent COVID infection, will give if approved by pharmacy  - goal Hgb > 8    Afib:  - c/w Eliquis  - c/w coreg  - cardio ([facility]), f/up recs    Type 2 DM:  - goal glucose 100-180  - monitor FSG lazara as pt on decadron   - c/w HISS    psych - continue abilify, lamictal, effexor    DVT prophy   - c/w Eliquis

## 2022-01-22 NOTE — PHYSICAL THERAPY INITIAL EVALUATION ADULT - PERTINENT HX OF CURRENT PROBLEM, REHAB EVAL
Pt is 77 y.o. M BIBEMS with worsening SOB, recently d/c for acute hypoxic respiratory failure 2/2 COVID PNA.

## 2022-01-22 NOTE — PHYSICAL THERAPY INITIAL EVALUATION ADULT - IMPAIRMENTS FOUND, PT EVAL
aerobic capacity/endurance/cognitive impairment/gait, locomotion, and balance/gross motor/muscle strength

## 2022-01-22 NOTE — PROGRESS NOTE ADULT - SUBJECTIVE AND OBJECTIVE BOX
Date/Time Patient Seen:  		  Referring MD:   Data Reviewed	       Patient is a 77y old  Male who presents with a chief complaint of Dyspnea (21 Jan 2022 12:33)      Subjective/HPI     PAST MEDICAL & SURGICAL HISTORY:  Hypertension    Diabetes mellitus    Lupus    Hepatitis C    Anxiety and depression    CAD (coronary artery disease)  s/p stents    Diverticulosis    Hyperlipidemia    HTN (hypertension)  c/b multiple episodes of hypertensive urgency    HLD (hyperlipidemia)    Atrial fibrillation  first documented on EKG 10/7/2021    CAD (coronary artery disease)  s/p stents (not on plavix)    Type 2 diabetes mellitus  not on home insulin/ Meds    Anxiety  Depression  multiple psych medications    History of diverticulitis  07/2021    Diverticulosis  c/b GIB in 2020    Afib  on AC    Stage 3 chronic kidney disease    Anemia of chronic disease  Monoclonal Gammopathy-MGUS  pRBC transfusion 10/15/21    Chronic diastolic congestive heart failure    Multiple thyroid nodules    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  Had surgery ( April 2013 )    S/P tonsillectomy    S/P arthroscopic knee surgery  Bilateral ( 2005 )    Torsion of testicle  Had surgery at age 14    Pilonidal cyst  Had surgery ( 1969 )    S/P cataract surgery  Bilateral    H/O hernia repair          Medication list         MEDICATIONS  (STANDING):  amLODIPine   Tablet 10 milliGRAM(s) Oral daily  apixaban 5 milliGRAM(s) Oral two times a day  aspirin enteric coated 81 milliGRAM(s) Oral daily  atorvastatin 10 milliGRAM(s) Oral at bedtime  budesonide 160 MICROgram(s)/formoterol 4.5 MICROgram(s) Inhaler 2 Puff(s) Inhalation two times a day  carvedilol 6.25 milliGRAM(s) Oral every 12 hours  cloNIDine 0.2 milliGRAM(s) Oral three times a day  cyanocobalamin 1000 MICROGram(s) Oral daily  doxazosin 4 milliGRAM(s) Oral <User Schedule>  famotidine    Tablet 20 milliGRAM(s) Oral daily  folic acid 1 milliGRAM(s) Oral daily  furosemide   Injectable 40 milliGRAM(s) IV Push daily  hydrALAZINE 100 milliGRAM(s) Oral three times a day  isosorbide   mononitrate ER Tablet (IMDUR) 90 milliGRAM(s) Oral daily  lactobacillus acidophilus 1 Tablet(s) Oral daily  lamoTRIgine 100 milliGRAM(s) Oral daily  melatonin 5 milliGRAM(s) Oral at bedtime  potassium chloride    Tablet ER 20 milliEquivalent(s) Oral daily  venlafaxine XR. 150 milliGRAM(s) Oral daily    MEDICATIONS  (PRN):  acetaminophen     Tablet .. 650 milliGRAM(s) Oral every 6 hours PRN Temp greater or equal to 38C (100.4F), Mild Pain (1 - 3)         Vitals log        ICU Vital Signs Last 24 Hrs  T(C): 37 (22 Jan 2022 04:33), Max: 37.1 (21 Jan 2022 14:49)  T(F): 98.6 (22 Jan 2022 04:33), Max: 98.7 (21 Jan 2022 14:49)  HR: 75 (22 Jan 2022 04:33) (61 - 75)  BP: 145/88 (22 Jan 2022 04:33) (137/65 - 162/85)  BP(mean): --  ABP: --  ABP(mean): --  RR: 19 (22 Jan 2022 04:33) (17 - 20)  SpO2: 97% (22 Jan 2022 04:33) (94% - 99%)           Input and Output:  I&O's Detail      Lab Data                        9.1    6.99  )-----------( 192      ( 21 Jan 2022 10:07 )             29.4     01-21    142  |  110<H>  |  33<H>  ----------------------------<  255<H>  3.6   |  26  |  2.40<H>    Ca    8.4<L>      21 Jan 2022 10:07    TPro  6.2  /  Alb  2.5<L>  /  TBili  0.2  /  DBili  x   /  AST  13<L>  /  ALT  24  /  AlkPhos  62  01-21            Review of Systems	      Objective     Physical Examination    heart s1s2  lung dc BS  abd  soft  head nc  on o2 support      Pertinent Lab findings & Imaging      Nikko:  NO   Adequate UO     I&O's Detail           Discussed with:     Cultures:	        Radiology

## 2022-01-22 NOTE — PROGRESS NOTE ADULT - SUBJECTIVE AND OBJECTIVE BOX
Patient is a 76y old  Male who presents with a chief complaint of worsening SOB.             INTERVAL HPI/OVERNIGHT EVENTS: Pt states SOB is improving since yesterday but still present. Pt reports VALDEZ. Pt denies CP, fever, chills, palpitations. Admits having some anxiety with prolonged hospitalization.    MEDICATIONS  (STANDING):  amLODIPine   Tablet 10 milliGRAM(s) Oral daily  apixaban 5 milliGRAM(s) Oral two times a day  aspirin enteric coated 81 milliGRAM(s) Oral daily  atorvastatin 10 milliGRAM(s) Oral at bedtime  budesonide 160 MICROgram(s)/formoterol 4.5 MICROgram(s) Inhaler 2 Puff(s) Inhalation two times a day  carvedilol 6.25 milliGRAM(s) Oral every 12 hours  cloNIDine 0.2 milliGRAM(s) Oral three times a day  cyanocobalamin 1000 MICROGram(s) Oral daily  doxazosin 4 milliGRAM(s) Oral <User Schedule>  epoetin michael-epbx (RETACRIT) Injectable 37521 Unit(s) SubCutaneous every 7 days  famotidine    Tablet 20 milliGRAM(s) Oral daily  folic acid 1 milliGRAM(s) Oral daily  furosemide   Injectable 40 milliGRAM(s) IV Push daily  hydrALAZINE 100 milliGRAM(s) Oral three times a day  isosorbide   mononitrate ER Tablet (IMDUR) 90 milliGRAM(s) Oral daily  lactobacillus acidophilus 1 Tablet(s) Oral daily  lamoTRIgine 100 milliGRAM(s) Oral daily  melatonin 5 milliGRAM(s) Oral at bedtime  venlafaxine XR. 150 milliGRAM(s) Oral daily    MEDICATIONS  (PRN):  acetaminophen     Tablet .. 650 milliGRAM(s) Oral every 6 hours PRN Temp greater or equal to 38C (100.4F), Mild Pain (1 - 3)            Allergies    No Known Allergies    Intolerances        REVIEW OF SYSTEMS:  CONSTITUTIONAL: No fever or chills  HEENT:  No headache, no sore throat  RESPIRATORY: +SOB at rest and +VALDEZ (improving)  CARDIOVASCULAR: No chest pain, palpitations  GASTROINTESTINAL: No abd pain, nausea, vomiting, or diarrhea  GENITOURINARY: No dysuria, frequency, or hematuria  NEUROLOGICAL: no focal weakness or dizziness  MUSCULOSKELETAL: +leg swelling (improving) ; no myalgias     Vital Signs Last 24 Hrs  T(C): 37 (22 Jan 2022 04:33), Max: 37.1 (21 Jan 2022 14:49)  T(F): 98.6 (22 Jan 2022 04:33), Max: 98.7 (21 Jan 2022 14:49)  HR: 75 (22 Jan 2022 04:33) (61 - 75)  BP: 145/88 (22 Jan 2022 04:33) (137/65 - 162/85)  BP(mean): --  RR: 19 (22 Jan 2022 04:33) (17 - 20)  SpO2: 97% (22 Jan 2022 04:33) (94% - 99%)    PHYSICAL EXAM:  GENERAL: NAD at rest on NC  HEENT:  anicteric, moist mucous membranes  CHEST/LUNG:  crackles b/l bases  HEART:  rrr, S1, S2, +murmur  ABDOMEN:  BS+, soft, nontender, nondistended  EXTREMITIES: + LE edema b/l, no cyanosis, or calf tenderness  NERVOUS SYSTEM: answers questions and follows commands appropriately    LABS:                                                              8.0    6.17  )-----------( 192      ( 22 Jan 2022 08:28 )             25.6     CBC Full  -  ( 22 Jan 2022 08:28 )  WBC Count : 6.17 K/uL  Hemoglobin : 8.0 g/dL  Hematocrit : 25.6 %  Platelet Count - Automated : 192 K/uL  Mean Cell Volume : 86.5 fl  Mean Cell Hemoglobin : 27.0 pg  Mean Cell Hemoglobin Concentration : 31.3 gm/dL  Auto Neutrophil # : 4.36 K/uL  Auto Lymphocyte # : 1.13 K/uL  Auto Monocyte # : 0.47 K/uL  Auto Eosinophil # : 0.18 K/uL  Auto Basophil # : 0.01 K/uL  Auto Neutrophil % : 70.7 %  Auto Lymphocyte % : 18.3 %  Auto Monocyte % : 7.6 %  Auto Eosinophil % : 2.9 %  Auto Basophil % : 0.2 %    01-22    140  |  109<H>  |  27<H>  ----------------------------<  111<H>  3.6   |  25  |  2.20<H>    Ca    8.7      22 Jan 2022 08:28    TPro  6.0  /  Alb  2.3<L>  /  TBili  0.3  /  DBili  x   /  AST  12<L>  /  ALT  22  /  AlkPhos  55  01-22        RADIOLOGY & ADDITIONAL TESTS:    Personally reviewed.     Consultant(s) Notes Reviewed:  [x] YES  [ ] NO     Patient is a 76y old  Male who presents with a chief complaint of worsening SOB.              INTERVAL HPI/OVERNIGHT EVENTS: Pt states SOB is improving since yesterday but still present. Pt reports VALDEZ. Pt denies CP, fever, chills, palpitations. Admits having some anxiety with prolonged hospitalization.    MEDICATIONS  (STANDING):  amLODIPine   Tablet 10 milliGRAM(s) Oral daily  apixaban 5 milliGRAM(s) Oral two times a day  aspirin enteric coated 81 milliGRAM(s) Oral daily  atorvastatin 10 milliGRAM(s) Oral at bedtime  budesonide 160 MICROgram(s)/formoterol 4.5 MICROgram(s) Inhaler 2 Puff(s) Inhalation two times a day  carvedilol 6.25 milliGRAM(s) Oral every 12 hours  cloNIDine 0.2 milliGRAM(s) Oral three times a day  cyanocobalamin 1000 MICROGram(s) Oral daily  doxazosin 4 milliGRAM(s) Oral <User Schedule>  epoetin michael-epbx (RETACRIT) Injectable 58168 Unit(s) SubCutaneous every 7 days  famotidine    Tablet 20 milliGRAM(s) Oral daily  folic acid 1 milliGRAM(s) Oral daily  furosemide   Injectable 40 milliGRAM(s) IV Push daily  hydrALAZINE 100 milliGRAM(s) Oral three times a day  isosorbide   mononitrate ER Tablet (IMDUR) 90 milliGRAM(s) Oral daily  lactobacillus acidophilus 1 Tablet(s) Oral daily  lamoTRIgine 100 milliGRAM(s) Oral daily  melatonin 5 milliGRAM(s) Oral at bedtime  venlafaxine XR. 150 milliGRAM(s) Oral daily    MEDICATIONS  (PRN):  acetaminophen     Tablet .. 650 milliGRAM(s) Oral every 6 hours PRN Temp greater or equal to 38C (100.4F), Mild Pain (1 - 3)            Allergies    No Known Allergies    Intolerances        REVIEW OF SYSTEMS:  CONSTITUTIONAL: No fever or chills  HEENT:  No headache, no sore throat  RESPIRATORY: +SOB at rest and +VALDEZ (improving)  CARDIOVASCULAR: No chest pain, palpitations  GASTROINTESTINAL: No abd pain, nausea, vomiting, or diarrhea  GENITOURINARY: No dysuria, frequency, or hematuria  NEUROLOGICAL: no focal weakness or dizziness  MUSCULOSKELETAL: +leg swelling (improving) ; no myalgias     Vital Signs Last 24 Hrs  T(C): 37 (22 Jan 2022 04:33), Max: 37.1 (21 Jan 2022 14:49)  T(F): 98.6 (22 Jan 2022 04:33), Max: 98.7 (21 Jan 2022 14:49)  HR: 75 (22 Jan 2022 04:33) (61 - 75)  BP: 145/88 (22 Jan 2022 04:33) (137/65 - 162/85)  BP(mean): --  RR: 19 (22 Jan 2022 04:33) (17 - 20)  SpO2: 97% (22 Jan 2022 04:33) (94% - 99%)    PHYSICAL EXAM:  GENERAL: NAD at rest on NC  HEENT:  anicteric, moist mucous membranes  CHEST/LUNG:  crackles b/l bases  HEART:  rrr, S1, S2, +murmur  ABDOMEN:  BS+, soft, nontender, nondistended  EXTREMITIES: + LE edema b/l, no cyanosis, or calf tenderness  NERVOUS SYSTEM: answers questions and follows commands appropriately    LABS:                                                              8.0    6.17  )-----------( 192      ( 22 Jan 2022 08:28 )             25.6     CBC Full  -  ( 22 Jan 2022 08:28 )  WBC Count : 6.17 K/uL  Hemoglobin : 8.0 g/dL  Hematocrit : 25.6 %  Platelet Count - Automated : 192 K/uL  Mean Cell Volume : 86.5 fl  Mean Cell Hemoglobin : 27.0 pg  Mean Cell Hemoglobin Concentration : 31.3 gm/dL  Auto Neutrophil # : 4.36 K/uL  Auto Lymphocyte # : 1.13 K/uL  Auto Monocyte # : 0.47 K/uL  Auto Eosinophil # : 0.18 K/uL  Auto Basophil # : 0.01 K/uL  Auto Neutrophil % : 70.7 %  Auto Lymphocyte % : 18.3 %  Auto Monocyte % : 7.6 %  Auto Eosinophil % : 2.9 %  Auto Basophil % : 0.2 %    01-22    140  |  109<H>  |  27<H>  ----------------------------<  111<H>  3.6   |  25  |  2.20<H>    Ca    8.7      22 Jan 2022 08:28    TPro  6.0  /  Alb  2.3<L>  /  TBili  0.3  /  DBili  x   /  AST  12<L>  /  ALT  22  /  AlkPhos  55  01-22        RADIOLOGY & ADDITIONAL TESTS:    Personally reviewed.     Consultant(s) Notes Reviewed:  [x] YES  [ ] NO

## 2022-01-23 LAB
ALBUMIN SERPL ELPH-MCNC: 2.5 G/DL — LOW (ref 3.3–5)
ALP SERPL-CCNC: 57 U/L — SIGNIFICANT CHANGE UP (ref 40–120)
ALT FLD-CCNC: 20 U/L — SIGNIFICANT CHANGE UP (ref 12–78)
ANION GAP SERPL CALC-SCNC: 8 MMOL/L — SIGNIFICANT CHANGE UP (ref 5–17)
AST SERPL-CCNC: 11 U/L — LOW (ref 15–37)
BASOPHILS # BLD AUTO: 0.01 K/UL — SIGNIFICANT CHANGE UP (ref 0–0.2)
BASOPHILS NFR BLD AUTO: 0.2 % — SIGNIFICANT CHANGE UP (ref 0–2)
BILIRUB SERPL-MCNC: 0.2 MG/DL — SIGNIFICANT CHANGE UP (ref 0.2–1.2)
BUN SERPL-MCNC: 31 MG/DL — HIGH (ref 7–23)
CALCIUM SERPL-MCNC: 8.2 MG/DL — LOW (ref 8.5–10.1)
CHLORIDE SERPL-SCNC: 109 MMOL/L — HIGH (ref 96–108)
CO2 SERPL-SCNC: 25 MMOL/L — SIGNIFICANT CHANGE UP (ref 22–31)
CREAT SERPL-MCNC: 2.3 MG/DL — HIGH (ref 0.5–1.3)
EOSINOPHIL # BLD AUTO: 0.09 K/UL — SIGNIFICANT CHANGE UP (ref 0–0.5)
EOSINOPHIL NFR BLD AUTO: 2 % — SIGNIFICANT CHANGE UP (ref 0–6)
GLUCOSE SERPL-MCNC: 112 MG/DL — HIGH (ref 70–99)
HCT VFR BLD CALC: 25 % — LOW (ref 39–50)
HGB BLD-MCNC: 8 G/DL — LOW (ref 13–17)
IMM GRANULOCYTES NFR BLD AUTO: 0.2 % — SIGNIFICANT CHANGE UP (ref 0–1.5)
LYMPHOCYTES # BLD AUTO: 0.85 K/UL — LOW (ref 1–3.3)
LYMPHOCYTES # BLD AUTO: 18.7 % — SIGNIFICANT CHANGE UP (ref 13–44)
MAGNESIUM SERPL-MCNC: 2.6 MG/DL — SIGNIFICANT CHANGE UP (ref 1.6–2.6)
MCHC RBC-ENTMCNC: 27.5 PG — SIGNIFICANT CHANGE UP (ref 27–34)
MCHC RBC-ENTMCNC: 32 GM/DL — SIGNIFICANT CHANGE UP (ref 32–36)
MCV RBC AUTO: 85.9 FL — SIGNIFICANT CHANGE UP (ref 80–100)
MONOCYTES # BLD AUTO: 0.38 K/UL — SIGNIFICANT CHANGE UP (ref 0–0.9)
MONOCYTES NFR BLD AUTO: 8.4 % — SIGNIFICANT CHANGE UP (ref 2–14)
NEUTROPHILS # BLD AUTO: 3.21 K/UL — SIGNIFICANT CHANGE UP (ref 1.8–7.4)
NEUTROPHILS NFR BLD AUTO: 70.5 % — SIGNIFICANT CHANGE UP (ref 43–77)
NRBC # BLD: 0 /100 WBCS — SIGNIFICANT CHANGE UP (ref 0–0)
PHOSPHATE SERPL-MCNC: 4.3 MG/DL — SIGNIFICANT CHANGE UP (ref 2.5–4.5)
PLATELET # BLD AUTO: 205 K/UL — SIGNIFICANT CHANGE UP (ref 150–400)
POTASSIUM SERPL-MCNC: 3.8 MMOL/L — SIGNIFICANT CHANGE UP (ref 3.5–5.3)
POTASSIUM SERPL-SCNC: 3.8 MMOL/L — SIGNIFICANT CHANGE UP (ref 3.5–5.3)
PROT SERPL-MCNC: 6 G/DL — SIGNIFICANT CHANGE UP (ref 6–8.3)
RBC # BLD: 2.91 M/UL — LOW (ref 4.2–5.8)
RBC # FLD: 17.8 % — HIGH (ref 10.3–14.5)
SODIUM SERPL-SCNC: 142 MMOL/L — SIGNIFICANT CHANGE UP (ref 135–145)
WBC # BLD: 4.55 K/UL — SIGNIFICANT CHANGE UP (ref 3.8–10.5)
WBC # FLD AUTO: 4.55 K/UL — SIGNIFICANT CHANGE UP (ref 3.8–10.5)

## 2022-01-23 PROCEDURE — 99233 SBSQ HOSP IP/OBS HIGH 50: CPT

## 2022-01-23 PROCEDURE — 99222 1ST HOSP IP/OBS MODERATE 55: CPT

## 2022-01-23 RX ORDER — POTASSIUM CHLORIDE 20 MEQ
40 PACKET (EA) ORAL ONCE
Refills: 0 | Status: COMPLETED | OUTPATIENT
Start: 2022-01-23 | End: 2022-01-23

## 2022-01-23 RX ORDER — FUROSEMIDE 40 MG
40 TABLET ORAL EVERY 12 HOURS
Refills: 0 | Status: DISCONTINUED | OUTPATIENT
Start: 2022-01-23 | End: 2022-01-25

## 2022-01-23 RX ADMIN — Medication 1 TABLET(S): at 12:55

## 2022-01-23 RX ADMIN — Medication 81 MILLIGRAM(S): at 12:55

## 2022-01-23 RX ADMIN — Medication 40 MILLIGRAM(S): at 06:39

## 2022-01-23 RX ADMIN — Medication 150 MILLIGRAM(S): at 12:55

## 2022-01-23 RX ADMIN — BUDESONIDE AND FORMOTEROL FUMARATE DIHYDRATE 2 PUFF(S): 160; 4.5 AEROSOL RESPIRATORY (INHALATION) at 08:30

## 2022-01-23 RX ADMIN — Medication 0.2 MILLIGRAM(S): at 14:23

## 2022-01-23 RX ADMIN — Medication 5 MILLIGRAM(S): at 21:30

## 2022-01-23 RX ADMIN — Medication 40 MILLIGRAM(S): at 18:01

## 2022-01-23 RX ADMIN — Medication 100 MILLIGRAM(S): at 06:40

## 2022-01-23 RX ADMIN — Medication 5 MILLIGRAM(S): at 01:10

## 2022-01-23 RX ADMIN — CARVEDILOL PHOSPHATE 6.25 MILLIGRAM(S): 80 CAPSULE, EXTENDED RELEASE ORAL at 18:01

## 2022-01-23 RX ADMIN — APIXABAN 5 MILLIGRAM(S): 2.5 TABLET, FILM COATED ORAL at 18:01

## 2022-01-23 RX ADMIN — ATORVASTATIN CALCIUM 10 MILLIGRAM(S): 80 TABLET, FILM COATED ORAL at 21:29

## 2022-01-23 RX ADMIN — Medication 0.2 MILLIGRAM(S): at 01:05

## 2022-01-23 RX ADMIN — Medication 100 MILLIGRAM(S): at 21:30

## 2022-01-23 RX ADMIN — Medication 4 MILLIGRAM(S): at 06:39

## 2022-01-23 RX ADMIN — Medication 0.2 MILLIGRAM(S): at 06:39

## 2022-01-23 RX ADMIN — IRON SUCROSE 100 MILLIGRAM(S): 20 INJECTION, SOLUTION INTRAVENOUS at 14:23

## 2022-01-23 RX ADMIN — APIXABAN 5 MILLIGRAM(S): 2.5 TABLET, FILM COATED ORAL at 06:39

## 2022-01-23 RX ADMIN — Medication 4 MILLIGRAM(S): at 18:01

## 2022-01-23 RX ADMIN — Medication 100 MILLIGRAM(S): at 14:23

## 2022-01-23 RX ADMIN — BUDESONIDE AND FORMOTEROL FUMARATE DIHYDRATE 2 PUFF(S): 160; 4.5 AEROSOL RESPIRATORY (INHALATION) at 19:27

## 2022-01-23 RX ADMIN — CARVEDILOL PHOSPHATE 6.25 MILLIGRAM(S): 80 CAPSULE, EXTENDED RELEASE ORAL at 06:40

## 2022-01-23 RX ADMIN — AMLODIPINE BESYLATE 10 MILLIGRAM(S): 2.5 TABLET ORAL at 06:39

## 2022-01-23 RX ADMIN — ISOSORBIDE MONONITRATE 90 MILLIGRAM(S): 60 TABLET, EXTENDED RELEASE ORAL at 12:57

## 2022-01-23 RX ADMIN — FAMOTIDINE 20 MILLIGRAM(S): 10 INJECTION INTRAVENOUS at 12:55

## 2022-01-23 RX ADMIN — LAMOTRIGINE 100 MILLIGRAM(S): 25 TABLET, ORALLY DISINTEGRATING ORAL at 12:55

## 2022-01-23 RX ADMIN — Medication 0.2 MILLIGRAM(S): at 21:29

## 2022-01-23 RX ADMIN — PREGABALIN 1000 MICROGRAM(S): 225 CAPSULE ORAL at 12:55

## 2022-01-23 RX ADMIN — Medication 1 MILLIGRAM(S): at 12:55

## 2022-01-23 RX ADMIN — Medication 40 MILLIEQUIVALENT(S): at 19:41

## 2022-01-23 NOTE — PROGRESS NOTE ADULT - SUBJECTIVE AND OBJECTIVE BOX
Subjective: dec SOB. O2 Sat 94% on RA      MEDICATIONS  (STANDING):  amLODIPine   Tablet 10 milliGRAM(s) Oral daily  apixaban 5 milliGRAM(s) Oral two times a day  aspirin enteric coated 81 milliGRAM(s) Oral daily  atorvastatin 10 milliGRAM(s) Oral at bedtime  budesonide 160 MICROgram(s)/formoterol 4.5 MICROgram(s) Inhaler 2 Puff(s) Inhalation two times a day  carvedilol 6.25 milliGRAM(s) Oral every 12 hours  cloNIDine 0.2 milliGRAM(s) Oral three times a day  cyanocobalamin 1000 MICROGram(s) Oral daily  doxazosin 4 milliGRAM(s) Oral <User Schedule>  epoetin michael-epbx (RETACRIT) Injectable 85840 Unit(s) SubCutaneous every 7 days  famotidine    Tablet 20 milliGRAM(s) Oral daily  folic acid 1 milliGRAM(s) Oral daily  furosemide   Injectable 40 milliGRAM(s) IV Push daily  hydrALAZINE 100 milliGRAM(s) Oral three times a day  iron sucrose Injectable 100 milliGRAM(s) IV Push every 24 hours  isosorbide   mononitrate ER Tablet (IMDUR) 90 milliGRAM(s) Oral daily  lactobacillus acidophilus 1 Tablet(s) Oral daily  lamoTRIgine 100 milliGRAM(s) Oral daily  melatonin 5 milliGRAM(s) Oral at bedtime  venlafaxine XR. 150 milliGRAM(s) Oral daily    MEDICATIONS  (PRN):  acetaminophen     Tablet .. 650 milliGRAM(s) Oral every 6 hours PRN Temp greater or equal to 38C (100.4F), Mild Pain (1 - 3)          T(C): 36.4 (01-23-22 @ 04:45), Max: 36.5 (01-22-22 @ 12:02)  HR: 67 (01-23-22 @ 04:45) (62 - 67)  BP: 174/90 (01-23-22 @ 04:45) (113/62 - 174/90)  RR: 19 (01-23-22 @ 04:45) (18 - 19)  SpO2: 94% (01-23-22 @ 04:45) (94% - 97%)  Wt(kg): --        I&O's Detail    22 Jan 2022 07:01  - 23 Jan 2022 07:00  --------------------------------------------------------  IN:  Total IN: 0 mL    OUT:    Voided (mL): 250 mL  Total OUT: 250 mL    Total NET: -250 mL               PHYSICAL EXAM:    GENERAL: NAD  NECK: Supple, no inc in JVP  CHEST/LUNG: dec BS  HEART: S1S2  ABDOMEN: Soft, Nontender, Nondistended; Bowel sounds present  EXTREMITIES: pos edema      LABS:  CBC Full  -  ( 23 Jan 2022 08:50 )  WBC Count : 4.55 K/uL  RBC Count : 2.91 M/uL  Hemoglobin : 8.0 g/dL  Hematocrit : 25.0 %  Platelet Count - Automated : 205 K/uL  Mean Cell Volume : 85.9 fl  Mean Cell Hemoglobin : 27.5 pg  Mean Cell Hemoglobin Concentration : 32.0 gm/dL  Auto Neutrophil # : 3.21 K/uL  Auto Lymphocyte # : 0.85 K/uL  Auto Monocyte # : 0.38 K/uL  Auto Eosinophil # : 0.09 K/uL  Auto Basophil # : 0.01 K/uL  Auto Neutrophil % : 70.5 %  Auto Lymphocyte % : 18.7 %  Auto Monocyte % : 8.4 %  Auto Eosinophil % : 2.0 %  Auto Basophil % : 0.2 %    01-23    142  |  109<H>  |  31<H>  ----------------------------<  112<H>  3.8   |  25  |  2.30<H>    Ca    8.2<L>      23 Jan 2022 08:50    TPro  6.0  /  Alb  2.5<L>  /  TBili  0.2  /  DBili  x   /  AST  11<L>  /  ALT  20  /  AlkPhos  57  01-23          Impression:  * 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

## 2022-01-23 NOTE — PROGRESS NOTE ADULT - ASSESSMENT
76M from group home, with PMH of DM2, HTN, HLD, CAD (s/p stents), Afib (on Eliquis), stage 3 CKD, hx of MGUS    HFPEF  CKD  Anemia  Ex Smoker  COVID - viral pna  Hypoxemia  Anxious  CAD  AF    RENAL eval noted - procrit added  s/p LASIX -   cxr noted  proBNP noted  renal indices monitoring  cvs rx regimen optimization    remains covid pos -   isolation precs  completed Remdesivir - Decadron  monitor VS and Sat  fio2 support titration - keep sat > 88 pct  optimization of cvs rx regimen  cxr on admission - 1- - viral pna - worsening when compared to prior films  D dimer - serially - DVT p - on ELIQUIS  Consideration for Judicious Diuresis - As per Renal and Cardio  Anxiolytics  Emotional support

## 2022-01-23 NOTE — PROGRESS NOTE ADULT - SUBJECTIVE AND OBJECTIVE BOX
Patient is a 76y old  Male who presents with a chief complaint of worsening SOB.              INTERVAL HPI/OVERNIGHT EVENTS: Pt states SOB is improving since yesterday but still present. Pt admits VALDEZ. Pt denies CP, fever, chills, palpitations.     MEDICATIONS  (STANDING):  amLODIPine   Tablet 10 milliGRAM(s) Oral daily  apixaban 5 milliGRAM(s) Oral two times a day  aspirin enteric coated 81 milliGRAM(s) Oral daily  atorvastatin 10 milliGRAM(s) Oral at bedtime  budesonide 160 MICROgram(s)/formoterol 4.5 MICROgram(s) Inhaler 2 Puff(s) Inhalation two times a day  carvedilol 6.25 milliGRAM(s) Oral every 12 hours  cloNIDine 0.2 milliGRAM(s) Oral three times a day  cyanocobalamin 1000 MICROGram(s) Oral daily  doxazosin 4 milliGRAM(s) Oral <User Schedule>  epoetin michael-epbx (RETACRIT) Injectable 55517 Unit(s) SubCutaneous every 7 days  famotidine    Tablet 20 milliGRAM(s) Oral daily  folic acid 1 milliGRAM(s) Oral daily  furosemide   Injectable 40 milliGRAM(s) IV Push daily  hydrALAZINE 100 milliGRAM(s) Oral three times a day  iron sucrose Injectable 100 milliGRAM(s) IV Push every 24 hours  isosorbide   mononitrate ER Tablet (IMDUR) 90 milliGRAM(s) Oral daily  lactobacillus acidophilus 1 Tablet(s) Oral daily  lamoTRIgine 100 milliGRAM(s) Oral daily  melatonin 5 milliGRAM(s) Oral at bedtime  venlafaxine XR. 150 milliGRAM(s) Oral daily    MEDICATIONS  (PRN):  acetaminophen     Tablet .. 650 milliGRAM(s) Oral every 6 hours PRN Temp greater or equal to 38C (100.4F), Mild Pain (1 - 3)            Allergies    No Known Allergies    Intolerances        REVIEW OF SYSTEMS:  CONSTITUTIONAL: No fever or chills  HEENT:  No headache, no sore throat  RESPIRATORY: +SOB at rest and +VALDEZ (improving)  CARDIOVASCULAR: No chest pain, palpitations  GASTROINTESTINAL: No abd pain, nausea, vomiting, or diarrhea  GENITOURINARY: No dysuria, frequency, or hematuria  NEUROLOGICAL: no focal weakness or dizziness  MUSCULOSKELETAL: +leg swelling (improving) ; no myalgias     Vital Signs Last 24 Hrs  T(C): 36.4 (23 Jan 2022 04:45), Max: 36.5 (22 Jan 2022 12:02)  T(F): 97.6 (23 Jan 2022 04:45), Max: 97.7 (22 Jan 2022 12:02)  HR: 67 (23 Jan 2022 04:45) (62 - 67)  BP: 174/90 (23 Jan 2022 04:45) (113/62 - 174/90)  BP(mean): --  RR: 19 (23 Jan 2022 04:45) (18 - 19)  SpO2: 94% (23 Jan 2022 04:45) (94% - 97%)    PHYSICAL EXAM:  GENERAL: NAD at rest on NC  HEENT:  anicteric, moist mucous membranes  CHEST/LUNG:  crackles b/l bases  HEART:  rrr, S1, S2, +murmur  ABDOMEN:  BS+, soft, nontender, nondistended  EXTREMITIES: + LE edema b/l, no cyanosis, or calf tenderness  NERVOUS SYSTEM: answers questions and follows commands appropriately    LABS:                             8.0    4.55  )-----------( 205      ( 23 Jan 2022 08:50 )             25.0     CBC Full  -  ( 23 Jan 2022 08:50 )  WBC Count : 4.55 K/uL  Hemoglobin : 8.0 g/dL  Hematocrit : 25.0 %  Platelet Count - Automated : 205 K/uL  Mean Cell Volume : 85.9 fl  Mean Cell Hemoglobin : 27.5 pg  Mean Cell Hemoglobin Concentration : 32.0 gm/dL  Auto Neutrophil # : 3.21 K/uL  Auto Lymphocyte # : 0.85 K/uL  Auto Monocyte # : 0.38 K/uL  Auto Eosinophil # : 0.09 K/uL  Auto Basophil # : 0.01 K/uL  Auto Neutrophil % : 70.5 %  Auto Lymphocyte % : 18.7 %  Auto Monocyte % : 8.4 %  Auto Eosinophil % : 2.0 %  Auto Basophil % : 0.2 %    01-23    142  |  109<H>  |  31<H>  ----------------------------<  112<H>  3.8   |  25  |  2.30<H>    Ca    8.2<L>      23 Jan 2022 08:50  Phos  4.3     01-23  Mg     2.6     01-23    TPro  6.0  /  Alb  2.5<L>  /  TBili  0.2  /  DBili  x   /  AST  11<L>  /  ALT  20  /  AlkPhos  57  01-23        RADIOLOGY & ADDITIONAL TESTS:    Personally reviewed.     Consultant(s) Notes Reviewed:  [x] YES  [ ] NO     Patient is a 76y old  Male who presents with a chief complaint of worsening SOB.               INTERVAL HPI/OVERNIGHT EVENTS: Pt states SOB is improving since yesterday but still present. Pt admits VALDEZ. Pt denies CP, fever, chills, palpitations.     MEDICATIONS  (STANDING):  amLODIPine   Tablet 10 milliGRAM(s) Oral daily  apixaban 5 milliGRAM(s) Oral two times a day  aspirin enteric coated 81 milliGRAM(s) Oral daily  atorvastatin 10 milliGRAM(s) Oral at bedtime  budesonide 160 MICROgram(s)/formoterol 4.5 MICROgram(s) Inhaler 2 Puff(s) Inhalation two times a day  carvedilol 6.25 milliGRAM(s) Oral every 12 hours  cloNIDine 0.2 milliGRAM(s) Oral three times a day  cyanocobalamin 1000 MICROGram(s) Oral daily  doxazosin 4 milliGRAM(s) Oral <User Schedule>  epoetin michael-epbx (RETACRIT) Injectable 84776 Unit(s) SubCutaneous every 7 days  famotidine    Tablet 20 milliGRAM(s) Oral daily  folic acid 1 milliGRAM(s) Oral daily  furosemide   Injectable 40 milliGRAM(s) IV Push daily  hydrALAZINE 100 milliGRAM(s) Oral three times a day  iron sucrose Injectable 100 milliGRAM(s) IV Push every 24 hours  isosorbide   mononitrate ER Tablet (IMDUR) 90 milliGRAM(s) Oral daily  lactobacillus acidophilus 1 Tablet(s) Oral daily  lamoTRIgine 100 milliGRAM(s) Oral daily  melatonin 5 milliGRAM(s) Oral at bedtime  venlafaxine XR. 150 milliGRAM(s) Oral daily    MEDICATIONS  (PRN):  acetaminophen     Tablet .. 650 milliGRAM(s) Oral every 6 hours PRN Temp greater or equal to 38C (100.4F), Mild Pain (1 - 3)            Allergies    No Known Allergies    Intolerances        REVIEW OF SYSTEMS:  CONSTITUTIONAL: No fever or chills  HEENT:  No headache, no sore throat  RESPIRATORY: +SOB at rest and +VALDEZ (improving)  CARDIOVASCULAR: No chest pain, palpitations  GASTROINTESTINAL: No abd pain, nausea, vomiting, or diarrhea  GENITOURINARY: No dysuria, frequency, or hematuria  NEUROLOGICAL: no focal weakness or dizziness  MUSCULOSKELETAL: +leg swelling (improving) ; no myalgias     Vital Signs Last 24 Hrs  T(C): 36.4 (23 Jan 2022 04:45), Max: 36.5 (22 Jan 2022 12:02)  T(F): 97.6 (23 Jan 2022 04:45), Max: 97.7 (22 Jan 2022 12:02)  HR: 67 (23 Jan 2022 04:45) (62 - 67)  BP: 174/90 (23 Jan 2022 04:45) (113/62 - 174/90)  BP(mean): --  RR: 19 (23 Jan 2022 04:45) (18 - 19)  SpO2: 94% (23 Jan 2022 04:45) (94% - 97%)    PHYSICAL EXAM:  GENERAL: NAD at rest on NC  HEENT:  anicteric, moist mucous membranes  CHEST/LUNG:  crackles b/l bases  HEART:  rrr, S1, S2, +murmur  ABDOMEN:  BS+, soft, nontender, nondistended  EXTREMITIES: + LE edema b/l, no cyanosis, or calf tenderness  NERVOUS SYSTEM: answers questions and follows commands appropriately    LABS:                             8.0    4.55  )-----------( 205      ( 23 Jan 2022 08:50 )             25.0     CBC Full  -  ( 23 Jan 2022 08:50 )  WBC Count : 4.55 K/uL  Hemoglobin : 8.0 g/dL  Hematocrit : 25.0 %  Platelet Count - Automated : 205 K/uL  Mean Cell Volume : 85.9 fl  Mean Cell Hemoglobin : 27.5 pg  Mean Cell Hemoglobin Concentration : 32.0 gm/dL  Auto Neutrophil # : 3.21 K/uL  Auto Lymphocyte # : 0.85 K/uL  Auto Monocyte # : 0.38 K/uL  Auto Eosinophil # : 0.09 K/uL  Auto Basophil # : 0.01 K/uL  Auto Neutrophil % : 70.5 %  Auto Lymphocyte % : 18.7 %  Auto Monocyte % : 8.4 %  Auto Eosinophil % : 2.0 %  Auto Basophil % : 0.2 %    01-23    142  |  109<H>  |  31<H>  ----------------------------<  112<H>  3.8   |  25  |  2.30<H>    Ca    8.2<L>      23 Jan 2022 08:50  Phos  4.3     01-23  Mg     2.6     01-23    TPro  6.0  /  Alb  2.5<L>  /  TBili  0.2  /  DBili  x   /  AST  11<L>  /  ALT  20  /  AlkPhos  57  01-23        RADIOLOGY & ADDITIONAL TESTS:    Personally reviewed.     Consultant(s) Notes Reviewed:  [x] YES  [ ] NO

## 2022-01-23 NOTE — CONSULT NOTE ADULT - ASSESSMENT
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 Pt was recently d/c for acute hypoxic respiratory failure 2/2 covid-19 pneumonia 1/3-1/18. Pt states he has become increasing short of breath since he was discharged. Pt states he has also had mild headaches w dry cough with increased anxiety since d/c 3 days ago. Pt denies fevers, chills, chest pain, palpitations, n/v/d/c.    HFpEF:  He was started on lasix 40mg IV qd, CXR shows worsening infiltrates  He is neg 250mL   BNP 5773   Increase lasix to 40mg IV bid  strict I&Os, daily weights  TTE 01/2022 EF 55%, mild AS, mild MR/TR - no need to repeat    CAD:  c/w ASA 81mg  c/w statin  no s/s acute ischemia  c/w BB, imdur  avoid ACEi/ARB sec to CKD    AF:  c/w Eliquis  c/w coreg - rates well controlled    HTN:  overall well controlled although isolated elevation this morning - monitor closely   c/w norvasc, coreg, hydralazine, clonidine, imdur    CKD:  avoid nephrotoxins    - Monitor and replete lytes, keep K>4 and Mg >2  - Further cardiac workup will depend on clinical course.   - All other workup per primary team    Thank you for the consult  Will continue to follow

## 2022-01-23 NOTE — PROGRESS NOTE ADULT - ASSESSMENT
75yo M from group home, with PMH of DM2, HTN, HLD, CAD (s/p stents), Afib (on Eliquis), stage 3 CKD, hx of MGUS; recent admission for COVID PNA and ADHF, a/w acute hypoxic respiratory failure due to acute on chronic diastolic CHF.    Acute hypoxic respiratory failure due to acute on chronic diastolic CHF:  -of note pt had recent admission with COVID and ADHF earlier this month - completed remdesivir and decadron course on that admission  -pt admits to using salt in his diet, but states he has been taking his diuretics at home - counseled on low salt intake  -will give lasix 40mg IV BID for now  -on NC -- wean off as tolerated  -airborne/contact precautions  -Pulm following ([facility]), recs appreciated  -consult cardio ([facility]), recs appreciated    HTN:  - continue coreg, clonidine, norvasc, hydralazine, lasix  - monitor BP    ARIELLA on CKD 3:  - baseline Cr appears to be ~2.0-2.2 - renally dose meds, no nephrotoxics  - monitor closely on lasix  - nephro ([facility]), recs appreciated    Anemia:  - pt with hx of MGUS and CKD likely contributing to anemia, along with current acute illness  - Hgb now stable at 8.0  - no gross hematuria, hematochezia, melena; monitor for signs of blood loss  - Retacrit per nephro orders for the renal failure component of anemia   - will give venofer 100mg IV q24h x3 doses as ferritin relatively low for current CKD and recent COVID infection   - goal Hgb > 8    Afib:  - c/w Eliquis  - c/w coreg  - cardio ([facility]), recs appreciated    Type 2 DM:  - goal glucose 100-180  - monitor FSG   - c/w HISS    psych - continue abilify, lamictal, effexor    DVT prophy   - c/w Eliquis

## 2022-01-23 NOTE — PROGRESS NOTE ADULT - SUBJECTIVE AND OBJECTIVE BOX
Date/Time Patient Seen:  		  Referring MD:   Data Reviewed	       Patient is a 77y old  Male who presents with a chief complaint of Dyspnea (22 Jan 2022 11:14)      Subjective/HPI     PAST MEDICAL & SURGICAL HISTORY:  Hypertension    Diabetes mellitus    Lupus    Hepatitis C    Anxiety and depression    CAD (coronary artery disease)  s/p stents    Diverticulosis    Hyperlipidemia    HTN (hypertension)  c/b multiple episodes of hypertensive urgency    HLD (hyperlipidemia)    Atrial fibrillation  first documented on EKG 10/7/2021    CAD (coronary artery disease)  s/p stents (not on plavix)    Type 2 diabetes mellitus  not on home insulin/ Meds    Anxiety  Depression  multiple psych medications    History of diverticulitis  07/2021    Diverticulosis  c/b GIB in 2020    Afib  on AC    Stage 3 chronic kidney disease    Anemia of chronic disease  Monoclonal Gammopathy-MGUS  pRBC transfusion 10/15/21    Chronic diastolic congestive heart failure    Multiple thyroid nodules    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  Had surgery ( April 2013 )    S/P tonsillectomy    S/P arthroscopic knee surgery  Bilateral ( 2005 )    Torsion of testicle  Had surgery at age 14    Pilonidal cyst  Had surgery ( 1969 )    S/P cataract surgery  Bilateral    H/O hernia repair          Medication list         MEDICATIONS  (STANDING):  amLODIPine   Tablet 10 milliGRAM(s) Oral daily  apixaban 5 milliGRAM(s) Oral two times a day  aspirin enteric coated 81 milliGRAM(s) Oral daily  atorvastatin 10 milliGRAM(s) Oral at bedtime  budesonide 160 MICROgram(s)/formoterol 4.5 MICROgram(s) Inhaler 2 Puff(s) Inhalation two times a day  carvedilol 6.25 milliGRAM(s) Oral every 12 hours  cloNIDine 0.2 milliGRAM(s) Oral three times a day  cyanocobalamin 1000 MICROGram(s) Oral daily  doxazosin 4 milliGRAM(s) Oral <User Schedule>  epoetin michael-epbx (RETACRIT) Injectable 75569 Unit(s) SubCutaneous every 7 days  famotidine    Tablet 20 milliGRAM(s) Oral daily  folic acid 1 milliGRAM(s) Oral daily  furosemide   Injectable 40 milliGRAM(s) IV Push daily  hydrALAZINE 100 milliGRAM(s) Oral three times a day  iron sucrose Injectable 100 milliGRAM(s) IV Push every 24 hours  isosorbide   mononitrate ER Tablet (IMDUR) 90 milliGRAM(s) Oral daily  lactobacillus acidophilus 1 Tablet(s) Oral daily  lamoTRIgine 100 milliGRAM(s) Oral daily  melatonin 5 milliGRAM(s) Oral at bedtime  venlafaxine XR. 150 milliGRAM(s) Oral daily    MEDICATIONS  (PRN):  acetaminophen     Tablet .. 650 milliGRAM(s) Oral every 6 hours PRN Temp greater or equal to 38C (100.4F), Mild Pain (1 - 3)         Vitals log        ICU Vital Signs Last 24 Hrs  T(C): 36.4 (23 Jan 2022 04:45), Max: 36.5 (22 Jan 2022 12:02)  T(F): 97.6 (23 Jan 2022 04:45), Max: 97.7 (22 Jan 2022 12:02)  HR: 67 (23 Jan 2022 04:45) (62 - 67)  BP: 174/90 (23 Jan 2022 04:45) (113/62 - 174/90)  BP(mean): --  ABP: --  ABP(mean): --  RR: 19 (23 Jan 2022 04:45) (18 - 19)  SpO2: 94% (23 Jan 2022 04:45) (94% - 97%)           Input and Output:  I&O's Detail    21 Jan 2022 07:01  -  22 Jan 2022 07:00  --------------------------------------------------------  IN:  Total IN: 0 mL    OUT:    Voided (mL): 350 mL  Total OUT: 350 mL    Total NET: -350 mL          Lab Data                        8.0    6.17  )-----------( 192      ( 22 Jan 2022 08:28 )             25.6     01-22    140  |  109<H>  |  27<H>  ----------------------------<  111<H>  3.6   |  25  |  2.20<H>    Ca    8.7      22 Jan 2022 08:28    TPro  6.0  /  Alb  2.3<L>  /  TBili  0.3  /  DBili  x   /  AST  12<L>  /  ALT  22  /  AlkPhos  55  01-22            Review of Systems	      Objective     Physical Examination    on RA  heart s1s2  lung dc BS  abd soft      Pertinent Lab findings & Imaging      Nikko:  NO   Adequate UO     I&O's Detail    21 Jan 2022 07:01  -  22 Jan 2022 07:00  --------------------------------------------------------  IN:  Total IN: 0 mL    OUT:    Voided (mL): 350 mL  Total OUT: 350 mL    Total NET: -350 mL               Discussed with:     Cultures:	        Radiology

## 2022-01-23 NOTE — CONSULT NOTE ADULT - SUBJECTIVE AND OBJECTIVE BOX
· Subjective and Objective:   [facility] CARDIOLOGY CONSULTANTS:    Fifi Bliss, Bonifacio, Kalpesh, Abraham Sheridan Savella, Goodger      [phone]    CHIEF COMPLAINT: Patient is a 77y old  Male who presents with a chief complaint of Dyspnea (2022 05:40)    76 yo M PMH of A fib (on Eliquis), CAD s/p stents (not on plavix), CHF, HLD, HTN, CKD stage 3, DM2 MGUS s/p PRBC transfusion 10/21, anxiety/depression, BIBEMS for worsening sob. Pt was recently d/c for acute hypoxic respiratory failure 2/2 covid-19 pneumonia 1/3-. Pt states he has become increasing short of breath since he was discharged. Pt states he has also had mild headaches w dry cough with increased anxiety since d/c 3 days ago. Pt denies fevers, chills, chest pain, palpitations, n/v/d/c.  He was started on lasix 40mg IV qd, CXR shows worsening infilatrates  He is neg 250mL    ED:  Vitals: BP: 144/78; HR 70; T 36.9; RR 17; O2 96% on 2L NC   Labs: Hb 9.1; D-dimer 283; BUN/Cr 33/2.40; ; Procal 0.34; covid-19 positive   ECG: Afib @ 69 bpm, nonspecific T wave abnormalities   Imaging: cxr (my read); worsening R pulm infiltrates compared to prior.       PAST MEDICAL & SURGICAL HISTORY:  HTN (hypertension)  c/b multiple episodes of hypertensive urgency    HLD (hyperlipidemia)    Atrial fibrillation  first documented on EKG 10/7/2021    CAD (coronary artery disease)  s/p stents (not on plavix)    Type 2 diabetes mellitus  not on home insulin/ Meds    Anxiety  Depression  multiple psych medications    History of diverticulitis  2021    Diverticulosis  c/b GIB in     Afib  on AC    Stage 3 chronic kidney disease    Anemia of chronic disease  Monoclonal Gammopathy-MGUS  pRBC transfusion 10/15/21    Chronic diastolic congestive heart failure    Multiple thyroid nodules    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  Had surgery ( 2013 )    S/P tonsillectomy    S/P arthroscopic knee surgery  Bilateral (  )    Torsion of testicle  Had surgery at age 14    Pilonidal cyst  Had surgery (  )    S/P cataract surgery  Bilateral    H/O hernia repair        MEDICATIONS  (STANDING):  amLODIPine   Tablet 10 milliGRAM(s) Oral daily  apixaban 5 milliGRAM(s) Oral two times a day  aspirin enteric coated 81 milliGRAM(s) Oral daily  atorvastatin 10 milliGRAM(s) Oral at bedtime  budesonide 160 MICROgram(s)/formoterol 4.5 MICROgram(s) Inhaler 2 Puff(s) Inhalation two times a day  carvedilol 6.25 milliGRAM(s) Oral every 12 hours  cloNIDine 0.2 milliGRAM(s) Oral three times a day  cyanocobalamin 1000 MICROGram(s) Oral daily  doxazosin 4 milliGRAM(s) Oral <User Schedule>  epoetin michael-epbx (RETACRIT) Injectable 94021 Unit(s) SubCutaneous every 7 days  famotidine    Tablet 20 milliGRAM(s) Oral daily  folic acid 1 milliGRAM(s) Oral daily  furosemide   Injectable 40 milliGRAM(s) IV Push daily  hydrALAZINE 100 milliGRAM(s) Oral three times a day  iron sucrose Injectable 100 milliGRAM(s) IV Push every 24 hours  isosorbide   mononitrate ER Tablet (IMDUR) 90 milliGRAM(s) Oral daily  lactobacillus acidophilus 1 Tablet(s) Oral daily  lamoTRIgine 100 milliGRAM(s) Oral daily  melatonin 5 milliGRAM(s) Oral at bedtime  venlafaxine XR. 150 milliGRAM(s) Oral daily      Allergies    No Known Allergies    Intolerances                              8.0    6.17  )-----------( 192      ( 2022 08:28 )             25.6       -    140  |  109<H>  |  27<H>  ----------------------------<  111<H>  3.6   |  25  |  2.20<H>    Ca    8.7      2022 08:28    TPro  6.0  /  Alb  2.3<L>  /  TBili  0.3  /  DBili  x   /  AST  12<L>  /  ALT  22  /  AlkPhos  55  22      LIVER FUNCTIONS - ( 2022 08:28 )  Alb: 2.3 g/dL / Pro: 6.0 g/dL / ALK PHOS: 55 U/L / ALT: 22 U/L / AST: 12 U/L / GGT: x                                   Daily     Daily Weight in k.1 (2022 04:45)    I&O's Summary    2022 07:01  -  2022 07:00  --------------------------------------------------------  IN: 0 mL / OUT: 250 mL / NET: -250 mL        Vital Signs Last 24 Hrs  T(C): 36.4 (2022 04:45), Max: 36.5 (2022 12:02)  T(F): 97.6 (2022 04:45), Max: 97.7 (2022 12:02)  HR: 67 (2022 04:45) (62 - 67)  BP: 174/90 (2022 04:45) (113/62 - 174/90)  BP(mean): --  RR: 19 (2022 04:45) (18 - 19)  SpO2: 94% (2022 04:45) (94% - 97%)    PHYSICAL EXAM:   · Constitutional	Well-developed, well nourished  · Eyes	EOMI; PERRL; no drainage or redness  · ENMT	No oral lesions; no gross abnormalities  · Neck	No bruits; no thyromegaly or nodules  · Respiratory	Normal breath sounds b/l, No RRW  · Cardiovascular	Regular rate & rhythm, normal S1, S2; no murmurs, gallops or rubs; no S3, S4  · Gastrointestinal	Soft, non-tender, no hepatosplenomegaly, normal bowel sounds  · Extremities	No cyanosis, clubbing or edema  · Vascular	Equal and normal pulses (carotid, femoral, dorsalis pedis)  · Neurological	Alert & oriented; no sensory, motor or coordination deficits, normal reflexes      
Date/Time Patient Seen:  		  Referring MD:   Data Reviewed	       Patient is a 77y old  Male who presents with a chief complaint of     Subjective/HPI    vs noted  labs reviewed  H and P reviewed  ER provider note reviewed  left [place] recently  covid pos  non smoker  non drinker  lives in a group home    alert  verbal  on o2      Hospital Course:  Discharge Date	18-Jan-2022  Admission Date	03-Jan-2022 12:30  Reason for Admission	CHF exacerbation  Hospital Course	  77 yo M PMH of A fib (on Eliquis) , MGUS s/p PRBC transfusion 10/21, anxiety/depression, CAD s/p stents (not on plavix), CHF, Diverticulosis, HLD, HTN, thyroid nodules, CKD stage 3, DM2 presents to the ED with SOB and LE edema. The patient lives in a group home and is unsure why the supervisor suggested he come to the ED. He reports difficulty breathing for a few months now, but it has been worsening over the last week. His shortness of breath is primarily on exertion. The patient also has wheezing and a dry cough. He states he has been sleeping in a sitting position, but does not understand why he had to come to the ED, states his LE swelling does not seem to be worse than his baseline. Also complaining of left sided chest pain which has been ongoing for a few days now, claims it to be 2/2 reflux. Denies radiation of the pain and diaphoresis. Denies fevers/chills, n/v/c/d, myalgias.   FAMILY HISTORY:  FHx: throat cancer  No family history of colorectal cancer.     Social History:  Social History (marital status, living situation, occupation, tobacco use, alcohol and drug use, and sexual history): Tobacco: former smoker ~8 pack years, quit 45 years ago   EtOH: denies  recreational drug use: denies  Lives with: group home   Ambulates:  walker   ADLs: independent  Vaccinations: Moderna x2 (May 2021), denies flu vaccine     Tobacco Screening:  · Core Measure Site	Yes  · Has the patient used tobacco in the past 30 days?	No    Risk Assessment:    Present on Admission:  Deep Venous Thrombosis	no  Pulmonary Embolus	no     Heart Failure:  Does this patient have a history of or has been diagnosed with heart failure? yes.     LV Function Assessment (LVS function was evaluated before arrival and/or during hospitalization) yes.     Is the Ejection Fraction >40% ? no.     Ejection Fraction 55 %.     Are there any contraindications to ACEI/ARB therapy? No.       PAST MEDICAL & SURGICAL HISTORY:  Hypertension    Diabetes mellitus    Lupus    Hepatitis C    Anxiety and depression    CAD (coronary artery disease)  s/p stents    Diverticulosis    Hyperlipidemia    HTN (hypertension)  c/b multiple episodes of hypertensive urgency    HLD (hyperlipidemia)    Atrial fibrillation  first documented on EKG 10/7/2021    CAD (coronary artery disease)  s/p stents (not on plavix)    Type 2 diabetes mellitus  not on home insulin/ Meds    Anxiety  Depression  multiple psych medications    History of diverticulitis  07/2021    Diverticulosis  c/b GIB in 2020    Afib  on AC    Stage 3 chronic kidney disease    Anemia of chronic disease  Monoclonal Gammopathy-MGUS  pRBC transfusion 10/15/21    Chronic diastolic congestive heart failure    Multiple thyroid nodules    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  Had surgery ( April 2013 )    S/P tonsillectomy    S/P arthroscopic knee surgery  Bilateral ( 2005 )    Torsion of testicle  Had surgery at age 14    Pilonidal cyst  Had surgery ( 1969 )    S/P cataract surgery  Bilateral    H/O hernia repair          Medication list         MEDICATIONS  (STANDING):    MEDICATIONS  (PRN):         Vitals log        ICU Vital Signs Last 24 Hrs  T(C): 36.9 (21 Jan 2022 09:35), Max: 36.9 (21 Jan 2022 09:35)  T(F): 98.5 (21 Jan 2022 09:35), Max: 98.5 (21 Jan 2022 09:35)  HR: 70 (21 Jan 2022 09:35) (70 - 70)  BP: 144/78 (21 Jan 2022 09:35) (144/78 - 144/78)  BP(mean): --  ABP: --  ABP(mean): --  RR: 17 (21 Jan 2022 09:35) (17 - 17)  SpO2: 99% (21 Jan 2022 09:41) (96% - 99%)           Input and Output:  I&O's Detail      Lab Data                        9.1    6.99  )-----------( 192      ( 21 Jan 2022 10:07 )             29.4     01-21    142  |  110<H>  |  33<H>  ----------------------------<  255<H>  3.6   |  26  |  2.40<H>    Ca    8.4<L>      21 Jan 2022 10:07    TPro  6.2  /  Alb  2.5<L>  /  TBili  0.2  /  DBili  x   /  AST  13<L>  /  ALT  24  /  AlkPhos  62  01-21            Review of Systems	  sob  alicia  anxious      Objective     Physical Examination    heart s1s2  lung dec BS  abd soft  head nc  on o2 support  verbal  alert      Pertinent Lab findings & Imaging      El:  NO   Adequate UO     I&O's Detail           Discussed with:     Cultures:	        Radiology    ACC: [id number] EXAM:  XR CHEST PORTABLE IMMED 1V                          PROCEDURE DATE:  01/05/2022          INTERPRETATION:  DATE OF STUDY: 1/5/22    PRIOR:12/24/21    CLINICAL INDICATION: New fever.    TECHNIQUE: portable chest.    FINDINGS:  There is stable cardiomegaly.  The left lung is clear.  Partial clearing of previously noted lower right lung airspace haziness.  New airspace opacity in mid-right lung just above right minor fissure.   New airspace opacity in upper right lung -these findings are concerning   for pneumonia right clinical setting.  No sizable pleural effusion. No pneumothorax.  Degenerative changes of the thoracic spine.    IMPRESSION:  Clear left lung.  New probable pneumonic infiltrates in mid and upper right lung.    --- End of Report ---            JOHN MARADIAGA MD; Attending Radiologist  This document has been electronically signed. Jan 5 2022  9:44AM                          
HPI:    Patient is a 77y old  Male with CKD3-4, Cr in low to mid 2s baseline, diastolic HF, MGUS, chronic anemia, recent COVID who returns for management of progressive SOB, VALDEZ, incr LE edema.   His serum Cr appears at baseline. Hgb decline noted.              (2022 12:33)      PAST MEDICAL & SURGICAL HISTORY:  HTN (hypertension)  c/b multiple episodes of hypertensive urgency    HLD (hyperlipidemia)    Atrial fibrillation  first documented on EKG 10/7/2021    CAD (coronary artery disease)  s/p stents (not on plavix)    Type 2 diabetes mellitus  not on home insulin/ Meds    Anxiety  Depression  multiple psych medications    History of diverticulitis  2021    Diverticulosis  c/b GIB in     Afib  on AC    Stage 3 chronic kidney disease    Anemia of chronic disease  Monoclonal Gammopathy-MGUS  pRBC transfusion 10/15/21    Chronic diastolic congestive heart failure    Multiple thyroid nodules    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  Had surgery ( 2013 )    S/P tonsillectomy    S/P arthroscopic knee surgery  Bilateral (  )    Torsion of testicle  Had surgery at age 14    Pilonidal cyst  Had surgery (  )    S/P cataract surgery  Bilateral    H/O hernia repair      FAMILY HISTORY:  FHx: throat cancer    No family history of colorectal cancer        Allergies    No Known Allergies    Intolerances        MEDICATIONS  (STANDING):  amLODIPine   Tablet 10 milliGRAM(s) Oral daily  apixaban 5 milliGRAM(s) Oral two times a day  aspirin enteric coated 81 milliGRAM(s) Oral daily  atorvastatin 10 milliGRAM(s) Oral at bedtime  budesonide 160 MICROgram(s)/formoterol 4.5 MICROgram(s) Inhaler 2 Puff(s) Inhalation two times a day  carvedilol 6.25 milliGRAM(s) Oral every 12 hours  cloNIDine 0.2 milliGRAM(s) Oral three times a day  cyanocobalamin 1000 MICROGram(s) Oral daily  doxazosin 4 milliGRAM(s) Oral <User Schedule>  epoetin michael-epbx (RETACRIT) Injectable 92614 Unit(s) SubCutaneous every 7 days  famotidine    Tablet 20 milliGRAM(s) Oral daily  folic acid 1 milliGRAM(s) Oral daily  furosemide   Injectable 40 milliGRAM(s) IV Push daily  hydrALAZINE 100 milliGRAM(s) Oral three times a day  isosorbide   mononitrate ER Tablet (IMDUR) 90 milliGRAM(s) Oral daily  lactobacillus acidophilus 1 Tablet(s) Oral daily  lamoTRIgine 100 milliGRAM(s) Oral daily  melatonin 5 milliGRAM(s) Oral at bedtime  potassium chloride    Tablet ER 20 milliEquivalent(s) Oral once  venlafaxine XR. 150 milliGRAM(s) Oral daily    MEDICATIONS  (PRN):  acetaminophen     Tablet .. 650 milliGRAM(s) Oral every 6 hours PRN Temp greater or equal to 38C (100.4F), Mild Pain (1 - 3)      Daily     Daily Weight in k.1 (2022 04:33)    Drug Dosing Weight  Height (cm): 172.7 (2022 09:35)  Weight (kg): 106.141 (2022 16:17)  BMI (kg/m2): 35.6 (2022 16:17)  BSA (m2): 2.18 (2022 16:17)      REVIEW OF SYSTEMS:    Per HPI            I&O's Detail    2022 07:01  -  2022 07:00  --------------------------------------------------------  IN:  Total IN: 0 mL    OUT:    Voided (mL): 350 mL  Total OUT: 350 mL    Total NET: -350 mL           @ 07:01  -  - @ 07:00  --------------------------------------------------------  IN: 0 mL / OUT: 350 mL / NET: -350 mL        PHYSICAL EXAM:    GENERAL: anxious  HEAD:  Atraumatic, normocephalic  ENMT: moist mucous membranes.   NECK: Supple. POS increase in JVP  CHEST/LUNG: dec BS at bases  HEART: Regular rate and rhythm. No murmurs, rubs, or gallops  ABDOMEN: Soft, Nontender, Nondistended. POS BS  EXTREMITIES:  pos edema    LABS:  CBC Full  -  ( 2022 08:28 )  WBC Count : 6.17 K/uL  RBC Count : 2.96 M/uL  Hemoglobin : 8.0 g/dL  Hematocrit : 25.6 %  Platelet Count - Automated : 192 K/uL  Mean Cell Volume : 86.5 fl  Mean Cell Hemoglobin : 27.0 pg  Mean Cell Hemoglobin Concentration : 31.3 gm/dL  Auto Neutrophil # : 4.36 K/uL  Auto Lymphocyte # : 1.13 K/uL  Auto Monocyte # : 0.47 K/uL  Auto Eosinophil # : 0.18 K/uL  Auto Basophil # : 0.01 K/uL  Auto Neutrophil % : 70.7 %  Auto Lymphocyte % : 18.3 %  Auto Monocyte % : 7.6 %  Auto Eosinophil % : 2.9 %  Auto Basophil % : 0.2 %        140  |  109<H>  |  27<H>  ----------------------------<  111<H>  3.6   |  25  |  2.20<H>    Ca    8.7      2022 08:28    TPro  6.0  /  Alb  2.3<L>  /  TBili  0.3  /  DBili  x   /  AST  12<L>  /  ALT  22  /  AlkPhos  55      CAPILLARY BLOOD GLUCOSE          Impression:  * 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

## 2022-01-24 LAB
ALBUMIN SERPL ELPH-MCNC: 2.6 G/DL — LOW (ref 3.3–5)
ALP SERPL-CCNC: 57 U/L — SIGNIFICANT CHANGE UP (ref 40–120)
ALT FLD-CCNC: 17 U/L — SIGNIFICANT CHANGE UP (ref 12–78)
ANION GAP SERPL CALC-SCNC: 7 MMOL/L — SIGNIFICANT CHANGE UP (ref 5–17)
AST SERPL-CCNC: 8 U/L — LOW (ref 15–37)
BASOPHILS # BLD AUTO: 0.01 K/UL — SIGNIFICANT CHANGE UP (ref 0–0.2)
BASOPHILS NFR BLD AUTO: 0.3 % — SIGNIFICANT CHANGE UP (ref 0–2)
BILIRUB SERPL-MCNC: 0.2 MG/DL — SIGNIFICANT CHANGE UP (ref 0.2–1.2)
BUN SERPL-MCNC: 28 MG/DL — HIGH (ref 7–23)
CALCIUM SERPL-MCNC: 9.1 MG/DL — SIGNIFICANT CHANGE UP (ref 8.5–10.1)
CHLORIDE SERPL-SCNC: 106 MMOL/L — SIGNIFICANT CHANGE UP (ref 96–108)
CO2 SERPL-SCNC: 28 MMOL/L — SIGNIFICANT CHANGE UP (ref 22–31)
CREAT SERPL-MCNC: 2.5 MG/DL — HIGH (ref 0.5–1.3)
EOSINOPHIL # BLD AUTO: 0.11 K/UL — SIGNIFICANT CHANGE UP (ref 0–0.5)
EOSINOPHIL NFR BLD AUTO: 2.8 % — SIGNIFICANT CHANGE UP (ref 0–6)
GLUCOSE SERPL-MCNC: 169 MG/DL — HIGH (ref 70–99)
HCT VFR BLD CALC: 25.3 % — LOW (ref 39–50)
HGB BLD-MCNC: 7.9 G/DL — LOW (ref 13–17)
IMM GRANULOCYTES NFR BLD AUTO: 0.3 % — SIGNIFICANT CHANGE UP (ref 0–1.5)
LYMPHOCYTES # BLD AUTO: 0.85 K/UL — LOW (ref 1–3.3)
LYMPHOCYTES # BLD AUTO: 21.6 % — SIGNIFICANT CHANGE UP (ref 13–44)
MAGNESIUM SERPL-MCNC: 2.3 MG/DL — SIGNIFICANT CHANGE UP (ref 1.6–2.6)
MCHC RBC-ENTMCNC: 27 PG — SIGNIFICANT CHANGE UP (ref 27–34)
MCHC RBC-ENTMCNC: 31.2 GM/DL — LOW (ref 32–36)
MCV RBC AUTO: 86.3 FL — SIGNIFICANT CHANGE UP (ref 80–100)
MONOCYTES # BLD AUTO: 0.3 K/UL — SIGNIFICANT CHANGE UP (ref 0–0.9)
MONOCYTES NFR BLD AUTO: 7.6 % — SIGNIFICANT CHANGE UP (ref 2–14)
NEUTROPHILS # BLD AUTO: 2.66 K/UL — SIGNIFICANT CHANGE UP (ref 1.8–7.4)
NEUTROPHILS NFR BLD AUTO: 67.4 % — SIGNIFICANT CHANGE UP (ref 43–77)
NRBC # BLD: 0 /100 WBCS — SIGNIFICANT CHANGE UP (ref 0–0)
PHOSPHATE SERPL-MCNC: 4.1 MG/DL — SIGNIFICANT CHANGE UP (ref 2.5–4.5)
PLATELET # BLD AUTO: 188 K/UL — SIGNIFICANT CHANGE UP (ref 150–400)
POTASSIUM SERPL-MCNC: 3.4 MMOL/L — LOW (ref 3.5–5.3)
POTASSIUM SERPL-SCNC: 3.4 MMOL/L — LOW (ref 3.5–5.3)
PROT SERPL-MCNC: 5.9 G/DL — LOW (ref 6–8.3)
RBC # BLD: 2.93 M/UL — LOW (ref 4.2–5.8)
RBC # FLD: 17.7 % — HIGH (ref 10.3–14.5)
SODIUM SERPL-SCNC: 141 MMOL/L — SIGNIFICANT CHANGE UP (ref 135–145)
WBC # BLD: 3.94 K/UL — SIGNIFICANT CHANGE UP (ref 3.8–10.5)
WBC # FLD AUTO: 3.94 K/UL — SIGNIFICANT CHANGE UP (ref 3.8–10.5)

## 2022-01-24 PROCEDURE — 99232 SBSQ HOSP IP/OBS MODERATE 35: CPT

## 2022-01-24 PROCEDURE — 99233 SBSQ HOSP IP/OBS HIGH 50: CPT

## 2022-01-24 RX ORDER — ERYTHROPOIETIN 10000 [IU]/ML
10000 INJECTION, SOLUTION INTRAVENOUS; SUBCUTANEOUS
Refills: 0 | Status: DISCONTINUED | OUTPATIENT
Start: 2022-01-24 | End: 2022-01-26

## 2022-01-24 RX ORDER — POTASSIUM CHLORIDE 20 MEQ
40 PACKET (EA) ORAL ONCE
Refills: 0 | Status: DISCONTINUED | OUTPATIENT
Start: 2022-01-24 | End: 2022-01-24

## 2022-01-24 RX ORDER — POTASSIUM CHLORIDE 20 MEQ
40 PACKET (EA) ORAL ONCE
Refills: 0 | Status: COMPLETED | OUTPATIENT
Start: 2022-01-24 | End: 2022-01-24

## 2022-01-24 RX ADMIN — ERYTHROPOIETIN 10000 UNIT(S): 10000 INJECTION, SOLUTION INTRAVENOUS; SUBCUTANEOUS at 11:28

## 2022-01-24 RX ADMIN — Medication 40 MILLIGRAM(S): at 17:13

## 2022-01-24 RX ADMIN — Medication 0.5 MILLIGRAM(S): at 23:34

## 2022-01-24 RX ADMIN — Medication 0.2 MILLIGRAM(S): at 05:22

## 2022-01-24 RX ADMIN — IRON SUCROSE 100 MILLIGRAM(S): 20 INJECTION, SOLUTION INTRAVENOUS at 13:42

## 2022-01-24 RX ADMIN — ISOSORBIDE MONONITRATE 90 MILLIGRAM(S): 60 TABLET, EXTENDED RELEASE ORAL at 11:29

## 2022-01-24 RX ADMIN — Medication 4 MILLIGRAM(S): at 05:22

## 2022-01-24 RX ADMIN — APIXABAN 5 MILLIGRAM(S): 2.5 TABLET, FILM COATED ORAL at 05:22

## 2022-01-24 RX ADMIN — ATORVASTATIN CALCIUM 10 MILLIGRAM(S): 80 TABLET, FILM COATED ORAL at 21:04

## 2022-01-24 RX ADMIN — CARVEDILOL PHOSPHATE 6.25 MILLIGRAM(S): 80 CAPSULE, EXTENDED RELEASE ORAL at 05:22

## 2022-01-24 RX ADMIN — BUDESONIDE AND FORMOTEROL FUMARATE DIHYDRATE 2 PUFF(S): 160; 4.5 AEROSOL RESPIRATORY (INHALATION) at 19:48

## 2022-01-24 RX ADMIN — Medication 1 MILLIGRAM(S): at 11:29

## 2022-01-24 RX ADMIN — Medication 0.2 MILLIGRAM(S): at 13:42

## 2022-01-24 RX ADMIN — Medication 150 MILLIGRAM(S): at 11:29

## 2022-01-24 RX ADMIN — Medication 5 MILLIGRAM(S): at 21:04

## 2022-01-24 RX ADMIN — PREGABALIN 1000 MICROGRAM(S): 225 CAPSULE ORAL at 11:29

## 2022-01-24 RX ADMIN — FAMOTIDINE 20 MILLIGRAM(S): 10 INJECTION INTRAVENOUS at 11:29

## 2022-01-24 RX ADMIN — CARVEDILOL PHOSPHATE 6.25 MILLIGRAM(S): 80 CAPSULE, EXTENDED RELEASE ORAL at 17:13

## 2022-01-24 RX ADMIN — Medication 0.2 MILLIGRAM(S): at 21:04

## 2022-01-24 RX ADMIN — Medication 81 MILLIGRAM(S): at 11:28

## 2022-01-24 RX ADMIN — APIXABAN 5 MILLIGRAM(S): 2.5 TABLET, FILM COATED ORAL at 17:13

## 2022-01-24 RX ADMIN — AMLODIPINE BESYLATE 10 MILLIGRAM(S): 2.5 TABLET ORAL at 05:22

## 2022-01-24 RX ADMIN — LAMOTRIGINE 100 MILLIGRAM(S): 25 TABLET, ORALLY DISINTEGRATING ORAL at 11:29

## 2022-01-24 RX ADMIN — Medication 40 MILLIGRAM(S): at 05:22

## 2022-01-24 RX ADMIN — Medication 40 MILLIEQUIVALENT(S): at 17:13

## 2022-01-24 RX ADMIN — Medication 1 TABLET(S): at 11:29

## 2022-01-24 RX ADMIN — Medication 100 MILLIGRAM(S): at 05:22

## 2022-01-24 RX ADMIN — Medication 100 MILLIGRAM(S): at 21:04

## 2022-01-24 RX ADMIN — Medication 4 MILLIGRAM(S): at 17:13

## 2022-01-24 RX ADMIN — Medication 100 MILLIGRAM(S): at 13:42

## 2022-01-24 NOTE — PROGRESS NOTE ADULT - SUBJECTIVE AND OBJECTIVE BOX
Date/Time Patient Seen:  		  Referring MD:   Data Reviewed	       Patient is a 77y old  Male who presents with a chief complaint of Dyspnea (23 Jan 2022 10:02)      Subjective/HPI     PAST MEDICAL & SURGICAL HISTORY:  Hypertension    Diabetes mellitus    Lupus    Hepatitis C    Anxiety and depression    CAD (coronary artery disease)  s/p stents    Diverticulosis    Hyperlipidemia    HTN (hypertension)  c/b multiple episodes of hypertensive urgency    HLD (hyperlipidemia)    Atrial fibrillation  first documented on EKG 10/7/2021    CAD (coronary artery disease)  s/p stents (not on plavix)    Type 2 diabetes mellitus  not on home insulin/ Meds    Anxiety  Depression  multiple psych medications    History of diverticulitis  07/2021    Diverticulosis  c/b GIB in 2020    Afib  on AC    Stage 3 chronic kidney disease    Anemia of chronic disease  Monoclonal Gammopathy-MGUS  pRBC transfusion 10/15/21    Chronic diastolic congestive heart failure    Multiple thyroid nodules    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  Had surgery ( April 2013 )    S/P tonsillectomy    S/P arthroscopic knee surgery  Bilateral ( 2005 )    Torsion of testicle  Had surgery at age 14    Pilonidal cyst  Had surgery ( 1969 )    S/P cataract surgery  Bilateral    H/O hernia repair          Medication list         MEDICATIONS  (STANDING):  amLODIPine   Tablet 10 milliGRAM(s) Oral daily  apixaban 5 milliGRAM(s) Oral two times a day  aspirin enteric coated 81 milliGRAM(s) Oral daily  atorvastatin 10 milliGRAM(s) Oral at bedtime  budesonide 160 MICROgram(s)/formoterol 4.5 MICROgram(s) Inhaler 2 Puff(s) Inhalation two times a day  carvedilol 6.25 milliGRAM(s) Oral every 12 hours  cloNIDine 0.2 milliGRAM(s) Oral three times a day  cyanocobalamin 1000 MICROGram(s) Oral daily  doxazosin 4 milliGRAM(s) Oral <User Schedule>  epoetin michael-epbx (RETACRIT) Injectable 83005 Unit(s) SubCutaneous every 7 days  famotidine    Tablet 20 milliGRAM(s) Oral daily  folic acid 1 milliGRAM(s) Oral daily  furosemide   Injectable 40 milliGRAM(s) IV Push every 12 hours  hydrALAZINE 100 milliGRAM(s) Oral three times a day  iron sucrose Injectable 100 milliGRAM(s) IV Push every 24 hours  isosorbide   mononitrate ER Tablet (IMDUR) 90 milliGRAM(s) Oral daily  lactobacillus acidophilus 1 Tablet(s) Oral daily  lamoTRIgine 100 milliGRAM(s) Oral daily  melatonin 5 milliGRAM(s) Oral at bedtime  venlafaxine XR. 150 milliGRAM(s) Oral daily    MEDICATIONS  (PRN):  acetaminophen     Tablet .. 650 milliGRAM(s) Oral every 6 hours PRN Temp greater or equal to 38C (100.4F), Mild Pain (1 - 3)         Vitals log        ICU Vital Signs Last 24 Hrs  T(C): 36.5 (24 Jan 2022 04:52), Max: 36.6 (23 Jan 2022 19:36)  T(F): 97.7 (24 Jan 2022 04:52), Max: 97.8 (23 Jan 2022 19:36)  HR: 65 (24 Jan 2022 04:52) (63 - 80)  BP: 143/84 (24 Jan 2022 04:52) (126/70 - 161/89)  BP(mean): --  ABP: --  ABP(mean): --  RR: 19 (24 Jan 2022 04:52) (18 - 19)  SpO2: 98% (24 Jan 2022 04:52) (93% - 98%)           Input and Output:  I&O's Detail    22 Jan 2022 07:01  -  23 Jan 2022 07:00  --------------------------------------------------------  IN:  Total IN: 0 mL    OUT:    Voided (mL): 250 mL  Total OUT: 250 mL    Total NET: -250 mL          Lab Data                        8.0    4.55  )-----------( 205      ( 23 Jan 2022 08:50 )             25.0     01-23    142  |  109<H>  |  31<H>  ----------------------------<  112<H>  3.8   |  25  |  2.30<H>    Ca    8.2<L>      23 Jan 2022 08:50  Phos  4.3     01-23  Mg     2.6     01-23    TPro  6.0  /  Alb  2.5<L>  /  TBili  0.2  /  DBili  x   /  AST  11<L>  /  ALT  20  /  AlkPhos  57  01-23            Review of Systems	      Objective     Physical Examination    heart s1s2  lung dec BS  abd soft  on o2 support      Pertinent Lab findings & Imaging      Nikko:  NO   Adequate UO     I&O's Detail    22 Jan 2022 07:01  -  23 Jan 2022 07:00  --------------------------------------------------------  IN:  Total IN: 0 mL    OUT:    Voided (mL): 250 mL  Total OUT: 250 mL    Total NET: -250 mL               Discussed with:     Cultures:	        Radiology

## 2022-01-24 NOTE — PROGRESS NOTE ADULT - ASSESSMENT
76M from group home, with PMH of DM2, HTN, HLD, CAD (s/p stents), Afib (on Eliquis), stage 3 CKD, hx of MGUS    HFPEF  CKD  Anemia  Ex Smoker  COVID - viral pna  Hypoxemia  Anxious  CAD  AF    RENAL eval noted - procrit added  cardio eval noted - LASIX dosing noted - monitor renal indices - I and O  cxr noted  proBNP noted  renal indices monitoring  cvs rx regimen optimization    remains covid pos -   isolation precs  completed Remdesivir - Decadron  monitor VS and Sat  fio2 support titration - keep sat > 88 pct  optimization of cvs rx regimen  cxr on admission - 1- - viral pna - worsening when compared to prior films  D dimer - serially - DVT p - on ELIQUIS  Consideration for Judicious Diuresis - As per Renal and Cardio  Anxiolytics  Emotional support

## 2022-01-24 NOTE — PROGRESS NOTE ADULT - SUBJECTIVE AND OBJECTIVE BOX
[facility] -- Fifi Bliss, Bonifacio, Kalpesh, Abraham Sheridan Savella  Office # [phone]      Follow Up:  volume overload   Subjective/Observations:    No complaints of chest pain, dyspnea, or palpitations reported. No signs of orthopnea   + le edema   REVIEW OF SYSTEMS: All other review of systems is negative unless indicated above    PAST MEDICAL & SURGICAL HISTORY:  HTN (hypertension)  c/b multiple episodes of hypertensive urgency    HLD (hyperlipidemia)    Atrial fibrillation  first documented on EKG 10/7/2021    CAD (coronary artery disease)  s/p stents (not on plavix)    Type 2 diabetes mellitus  not on home insulin/ Meds    Anxiety  Depression  multiple psych medications    History of diverticulitis  07/2021    Diverticulosis  c/b GIB in 2020    Afib  on AC    Stage 3 chronic kidney disease    Anemia of chronic disease  Monoclonal Gammopathy-MGUS  pRBC transfusion 10/15/21    Chronic diastolic congestive heart failure    Multiple thyroid nodules    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  Had surgery ( April 2013 )    S/P tonsillectomy    S/P arthroscopic knee surgery  Bilateral ( 2005 )    Torsion of testicle  Had surgery at age 14    Pilonidal cyst  Had surgery ( 1969 )    S/P cataract surgery  Bilateral    H/O hernia repair        MEDICATIONS  (STANDING):  amLODIPine   Tablet 10 milliGRAM(s) Oral daily  apixaban 5 milliGRAM(s) Oral two times a day  aspirin enteric coated 81 milliGRAM(s) Oral daily  atorvastatin 10 milliGRAM(s) Oral at bedtime  budesonide 160 MICROgram(s)/formoterol 4.5 MICROgram(s) Inhaler 2 Puff(s) Inhalation two times a day  carvedilol 6.25 milliGRAM(s) Oral every 12 hours  cloNIDine 0.2 milliGRAM(s) Oral three times a day  cyanocobalamin 1000 MICROGram(s) Oral daily  doxazosin 4 milliGRAM(s) Oral <User Schedule>  epoetin michael-epbx (RETACRIT) Injectable 64821 Unit(s) SubCutaneous <User Schedule>  famotidine    Tablet 20 milliGRAM(s) Oral daily  folic acid 1 milliGRAM(s) Oral daily  furosemide   Injectable 40 milliGRAM(s) IV Push every 12 hours  hydrALAZINE 100 milliGRAM(s) Oral three times a day  iron sucrose Injectable 100 milliGRAM(s) IV Push every 24 hours  isosorbide   mononitrate ER Tablet (IMDUR) 90 milliGRAM(s) Oral daily  lactobacillus acidophilus 1 Tablet(s) Oral daily  lamoTRIgine 100 milliGRAM(s) Oral daily  melatonin 5 milliGRAM(s) Oral at bedtime  venlafaxine XR. 150 milliGRAM(s) Oral daily    MEDICATIONS  (PRN):  acetaminophen     Tablet .. 650 milliGRAM(s) Oral every 6 hours PRN Temp greater or equal to 38C (100.4F), Mild Pain (1 - 3)      Allergies    No Known Allergies    Intolerances        Vital Signs Last 24 Hrs  T(C): 36.5 (24 Jan 2022 04:52), Max: 36.6 (23 Jan 2022 19:36)  T(F): 97.7 (24 Jan 2022 04:52), Max: 97.8 (23 Jan 2022 19:36)  HR: 65 (24 Jan 2022 04:52) (63 - 80)  BP: 143/84 (24 Jan 2022 04:52) (126/70 - 161/89)  BP(mean): --  RR: 19 (24 Jan 2022 04:52) (18 - 19)  SpO2: 98% (24 Jan 2022 04:52) (93% - 98%)    I&O's Summary    23 Jan 2022 07:01  -  24 Jan 2022 07:00  --------------------------------------------------------  IN: 0 mL / OUT: 500 mL / NET: -500 mL          PHYSICAL EXAM:  TELE: AF  Constitutional: NAD, awake and alert, obese  HEENT: Moist Mucous Membranes, Anicteric  Pulmonary: Non-labored, breath sounds are clear bilaterally, No wheezing, crackles or rhonchi  Cardiovascular: IRRegular, S1 and S2 nl, No murmurs, rubs, gallops or clicks  Gastrointestinal: Bowel Sounds present, soft, nontender.   Lymph: + le edema   Skin: No visible rashes or ulcers.  Psych:  Mood & affect appropriate    LABS: All Labs Reviewed:                        7.9    3.94  )-----------( 188      ( 24 Jan 2022 10:34 )             25.3                         8.0    4.55  )-----------( 205      ( 23 Jan 2022 08:50 )             25.0                         8.0    6.17  )-----------( 192      ( 22 Jan 2022 08:28 )             25.6     24 Jan 2022 10:34    141    |  106    |  28     ----------------------------<  169    3.4     |  28     |  2.50   23 Jan 2022 08:50    142    |  109    |  31     ----------------------------<  112    3.8     |  25     |  2.30   22 Jan 2022 08:28    140    |  109    |  27     ----------------------------<  111    3.6     |  25     |  2.20     Ca    9.1        24 Jan 2022 10:34  Ca    8.2        23 Jan 2022 08:50  Ca    8.7        22 Jan 2022 08:28  Phos  4.3       23 Jan 2022 08:50  Mg     2.6       23 Jan 2022 08:50    TPro  5.9    /  Alb  2.6    /  TBili  0.2    /  DBili  x      /  AST  8      /  ALT  17     /  AlkPhos  57     24 Jan 2022 10:34  TPro  6.0    /  Alb  2.5    /  TBili  0.2    /  DBili  x      /  AST  11     /  ALT  20     /  AlkPhos  57     23 Jan 2022 08:50  TPro  6.0    /  Alb  2.3    /  TBili  0.3    /  DBili  x      /  AST  12     /  ALT  22     /  AlkPhos  55     22 Jan 2022 08:28      < from: TTE Echo Complete w/o Contrast w/ Doppler (01.06.22 @ 10:52) >    OBSERVATIONS:  Technically difficult study  Mitral Valve: Mitral annular calcification with thickened leaflets, mild   MR.  Aortic Valve/Aorta: Calcified trileaflet aortic valve with decreased   opening. Peak transaortic valve gradient is 29.4 mmHg with a mean   transaortic valve gradient 14.7 mmHg. This consistent with mild aortic   stenosis.  Tricuspid Valve: Mild TR.  Pulmonic Valve: Not well-visualized  Left Atrium: Enlarged  Right Atrium: Not well-visualized  Left Ventricle: Moderate left ventricular hypertrophy with normal   systolic function, estimated LVEF of 55%.  Right Ventricle: Grossly normal size and systolic function.  Pericardium: no significant pericardial effusion.  Pulmonary/RV Pressure: estimated PA systolic pressure of 32mmHg        IMPRESSION:  Technically difficult study  Moderate left ventricular hypertrophy with normal systolic function,   estimated LVEF of 55%.  Grossly normal RV size and systolic function.  Calcified trileaflet aortic valve with mild aortic stenosis  Mild MR andTR.  No significant pericardial effusion.

## 2022-01-24 NOTE — PROGRESS NOTE ADULT - ASSESSMENT
CKD 4  Diabetes  CHF  Hypertension  h/o SLE  Hypokalemia  Anemia  COVID +, Pneumonia    Renal indices stable To continue current meds. Retacrit for anemia. Monitor blood sugar levels. Insulin coverage as needed. Monitor h/h trend.   PRN Potassium supplementation. Monitor BP trend. Titrate BP meds as needed. Salt restriction. On IV lasix.   Will follow electrolytes and renal function trend. Avoid nephrotoxic meds as possible.

## 2022-01-24 NOTE — PROGRESS NOTE ADULT - SUBJECTIVE AND OBJECTIVE BOX
Patient is a 76y old  Male who presents with a chief complaint of CHF exacerbation (05 Jan 2022 12:02)    Patient seen in follow up for CKD 4, Anemia.        PAST MEDICAL HISTORY:  Hypertension    Diabetes mellitus    Lupus    Hepatitis C    Anxiety and depression    CAD (coronary artery disease)    Diverticulosis    Hyperlipidemia    HTN (hypertension)    HLD (hyperlipidemia)    Atrial fibrillation    CAD (coronary artery disease)    Type 2 diabetes mellitus    Anxiety    History of diverticulitis    Diverticulosis    Afib    Stage 3 chronic kidney disease    Anemia of chronic disease    Chronic diastolic congestive heart failure    Multiple thyroid nodules      MEDICATIONS  (STANDING):  amLODIPine   Tablet 10 milliGRAM(s) Oral daily  apixaban 5 milliGRAM(s) Oral two times a day  aspirin enteric coated 81 milliGRAM(s) Oral daily  atorvastatin 10 milliGRAM(s) Oral at bedtime  budesonide 160 MICROgram(s)/formoterol 4.5 MICROgram(s) Inhaler 2 Puff(s) Inhalation two times a day  carvedilol 6.25 milliGRAM(s) Oral every 12 hours  cloNIDine 0.2 milliGRAM(s) Oral three times a day  cyanocobalamin 1000 MICROGram(s) Oral daily  doxazosin 4 milliGRAM(s) Oral <User Schedule>  epoetin michael-epbx (RETACRIT) Injectable 05924 Unit(s) SubCutaneous <User Schedule>  famotidine    Tablet 20 milliGRAM(s) Oral daily  folic acid 1 milliGRAM(s) Oral daily  furosemide   Injectable 40 milliGRAM(s) IV Push every 12 hours  hydrALAZINE 100 milliGRAM(s) Oral three times a day  isosorbide   mononitrate ER Tablet (IMDUR) 90 milliGRAM(s) Oral daily  lactobacillus acidophilus 1 Tablet(s) Oral daily  lamoTRIgine 100 milliGRAM(s) Oral daily  melatonin 5 milliGRAM(s) Oral at bedtime  venlafaxine XR. 150 milliGRAM(s) Oral daily    MEDICATIONS  (PRN):  acetaminophen     Tablet .. 650 milliGRAM(s) Oral every 6 hours PRN Temp greater or equal to 38C (100.4F), Mild Pain (1 - 3)    T(C): 36.9 (01-24-22 @ 13:20), Max: 36.9 (01-24-22 @ 13:20)  HR: 58 (01-24-22 @ 13:20) (58 - 80)  BP: 153/86 (01-24-22 @ 13:20) (118/66 - 174/90)  RR: 18 (01-24-22 @ 13:20)  SpO2: 98% (01-24-22 @ 13:20)  Wt(kg): --  I&O's Detail    23 Jan 2022 07:01  -  24 Jan 2022 07:00  --------------------------------------------------------  IN:  Total IN: 0 mL    OUT:    Voided (mL): 500 mL  Total OUT: 500 mL    Total NET: -500 mL      24 Jan 2022 07:01  -  24 Jan 2022 17:32  --------------------------------------------------------  IN:    Oral Fluid: 300 mL  Total IN: 300 mL    OUT:  Total OUT: 0 mL    Total NET: 300 mL                  PHYSICAL EXAM:  General: No distress  Respiratory: b/l air entry  Cardiovascular: S1 S2  Gastrointestinal: soft  Extremities:  + edema                          LABORATORY:                        7.9    3.94  )-----------( 188      ( 24 Jan 2022 10:34 )             25.3     01-24    141  |  106  |  28<H>  ----------------------------<  169<H>  3.4<L>   |  28  |  2.50<H>    Ca    9.1      24 Jan 2022 10:34  Phos  4.1     01-24  Mg     2.3     01-24    TPro  5.9<L>  /  Alb  2.6<L>  /  TBili  0.2  /  DBili  x   /  AST  8<L>  /  ALT  17  /  AlkPhos  57  01-24    Sodium, Serum: 141 mmol/L (01-24 @ 10:34)  Sodium, Serum: 142 mmol/L (01-23 @ 08:50)    Potassium, Serum: 3.4 mmol/L (01-24 @ 10:34)  Potassium, Serum: 3.8 mmol/L (01-23 @ 08:50)    Hemoglobin: 7.9 g/dL (01-24 @ 10:34)  Hemoglobin: 8.0 g/dL (01-23 @ 08:50)  Hemoglobin: 8.0 g/dL (01-22 @ 08:28)    Creatinine, Serum 2.50 (01-24 @ 10:34)  Creatinine, Serum 2.30 (01-23 @ 08:50)  Creatinine, Serum 2.20 (01-22 @ 08:28)        LIVER FUNCTIONS - ( 24 Jan 2022 10:34 )  Alb: 2.6 g/dL / Pro: 5.9 g/dL / ALK PHOS: 57 U/L / ALT: 17 U/L / AST: 8 U/L / GGT: x

## 2022-01-24 NOTE — PROGRESS NOTE ADULT - ASSESSMENT
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 Pt was recently d/c for acute hypoxic respiratory failure 2/2 covid-19 pneumonia 1/3-1/18. Pt states he has become increasing short of breath since he was discharged. Pt states he has also had mild headaches w dry cough with increased anxiety since d/c 3 days ago. Pt denies fevers, chills, chest pain, palpitations, n/v/d/c.    HFpEF/afib/ cad/ htn   hypervolemic on exam with LE edema-  continue with IV lasix changed to BID, follow up renal recommendation  strict intake and output- negative 500 cc 24hours   TTE 01/2022 EF 55%, mild AS, mild MR/TR - no need to repeat  continue Coreg, Statin, clonidine, norvasc, hydralazine isosorbide   Continue Eliquis for thromboembolism for afib   Anemia work up as per primary , given CAD keep hct >26   Monitor and replete lytes, keep K>4 and Mg >2   Further cardiac workup will depend on clinical course.    All other workup per primary team  Carmelina Leger FNP-C  Cardiology NP  [id number] [phone]

## 2022-01-24 NOTE — PROGRESS NOTE ADULT - SUBJECTIVE AND OBJECTIVE BOX
Patient is a 76y old  Male who presents with a chief complaint of worsening SOB.               INTERVAL HPI/OVERNIGHT EVENTS: Pt states SOB at rest has resolved. Pt admits VALDEZ. Now on RA at rest. Pt denies CP, fever, chills, palpitations.     MEDICATIONS  (STANDING):  amLODIPine   Tablet 10 milliGRAM(s) Oral daily  apixaban 5 milliGRAM(s) Oral two times a day  aspirin enteric coated 81 milliGRAM(s) Oral daily  atorvastatin 10 milliGRAM(s) Oral at bedtime  budesonide 160 MICROgram(s)/formoterol 4.5 MICROgram(s) Inhaler 2 Puff(s) Inhalation two times a day  carvedilol 6.25 milliGRAM(s) Oral every 12 hours  cloNIDine 0.2 milliGRAM(s) Oral three times a day  cyanocobalamin 1000 MICROGram(s) Oral daily  doxazosin 4 milliGRAM(s) Oral <User Schedule>  epoetin michael-epbx (RETACRIT) Injectable 73667 Unit(s) SubCutaneous <User Schedule>  famotidine    Tablet 20 milliGRAM(s) Oral daily  folic acid 1 milliGRAM(s) Oral daily  furosemide   Injectable 40 milliGRAM(s) IV Push every 12 hours  hydrALAZINE 100 milliGRAM(s) Oral three times a day  isosorbide   mononitrate ER Tablet (IMDUR) 90 milliGRAM(s) Oral daily  lactobacillus acidophilus 1 Tablet(s) Oral daily  lamoTRIgine 100 milliGRAM(s) Oral daily  melatonin 5 milliGRAM(s) Oral at bedtime  venlafaxine XR. 150 milliGRAM(s) Oral daily    MEDICATIONS  (PRN):  acetaminophen     Tablet .. 650 milliGRAM(s) Oral every 6 hours PRN Temp greater or equal to 38C (100.4F), Mild Pain (1 - 3)            Allergies    No Known Allergies    Intolerances        REVIEW OF SYSTEMS:  CONSTITUTIONAL: No fever or chills  HEENT:  No headache, no sore throat  RESPIRATORY: no SOB at rest, +VALDEZ (improving)  CARDIOVASCULAR: No chest pain, palpitations  GASTROINTESTINAL: No abd pain, nausea, vomiting, or diarrhea  GENITOURINARY: No dysuria, frequency, or hematuria  NEUROLOGICAL: no focal weakness or dizziness  MUSCULOSKELETAL: +leg swelling (improving) ; no myalgias     Vital Signs Last 24 Hrs  T(C): 36.9 (01-24-22 @ 13:20), Max: 36.9 (01-24-22 @ 13:20)  HR: 58 (01-24-22 @ 13:20) (58 - 80)  BP: 153/86 (01-24-22 @ 13:20) (118/66 - 174/90)  RR: 18 (01-24-22 @ 13:20)  SpO2: 98% (01-24-22 @ 13:20)    PHYSICAL EXAM:  GENERAL: NAD at rest on RA  HEENT:  anicteric, moist mucous membranes  CHEST/LUNG:  scant crackles b/l bases  HEART:  rrr, S1, S2, +murmur  ABDOMEN:  BS+, soft, nontender, nondistended  EXTREMITIES: + LE edema b/l, no cyanosis, or calf tenderness  NERVOUS SYSTEM: answers questions and follows commands appropriately    LABS:                                        7.9    3.94  )-----------( 188      ( 24 Jan 2022 10:34 )             25.3     CBC Full  -  ( 24 Jan 2022 10:34 )  WBC Count : 3.94 K/uL  Hemoglobin : 7.9 g/dL  Hematocrit : 25.3 %  Platelet Count - Automated : 188 K/uL  Mean Cell Volume : 86.3 fl  Mean Cell Hemoglobin : 27.0 pg  Mean Cell Hemoglobin Concentration : 31.2 gm/dL  Auto Neutrophil # : 2.66 K/uL  Auto Lymphocyte # : 0.85 K/uL  Auto Monocyte # : 0.30 K/uL  Auto Eosinophil # : 0.11 K/uL  Auto Basophil # : 0.01 K/uL  Auto Neutrophil % : 67.4 %  Auto Lymphocyte % : 21.6 %  Auto Monocyte % : 7.6 %  Auto Eosinophil % : 2.8 %  Auto Basophil % : 0.3 %    01-24    141  |  106  |  28<H>  ----------------------------<  169<H>  3.4<L>   |  28  |  2.50<H>    Ca    9.1      24 Jan 2022 10:34  Phos  4.1     01-24  Mg     2.3     01-24    TPro  5.9<L>  /  Alb  2.6<L>  /  TBili  0.2  /  DBili  x   /  AST  8<L>  /  ALT  17  /  AlkPhos  57  01-24          RADIOLOGY & ADDITIONAL TESTS:    Personally reviewed.     Consultant(s) Notes Reviewed:  [x] YES  [ ] NO     Patient is a 76y old  Male who presents with a chief complaint of worsening SOB.                INTERVAL HPI/OVERNIGHT EVENTS: Pt states SOB at rest has resolved. Pt admits VALDEZ. Now on RA at rest. Pt denies CP, fever, chills, palpitations.     MEDICATIONS  (STANDING):  amLODIPine   Tablet 10 milliGRAM(s) Oral daily  apixaban 5 milliGRAM(s) Oral two times a day  aspirin enteric coated 81 milliGRAM(s) Oral daily  atorvastatin 10 milliGRAM(s) Oral at bedtime  budesonide 160 MICROgram(s)/formoterol 4.5 MICROgram(s) Inhaler 2 Puff(s) Inhalation two times a day  carvedilol 6.25 milliGRAM(s) Oral every 12 hours  cloNIDine 0.2 milliGRAM(s) Oral three times a day  cyanocobalamin 1000 MICROGram(s) Oral daily  doxazosin 4 milliGRAM(s) Oral <User Schedule>  epoetin michael-epbx (RETACRIT) Injectable 25062 Unit(s) SubCutaneous <User Schedule>  famotidine    Tablet 20 milliGRAM(s) Oral daily  folic acid 1 milliGRAM(s) Oral daily  furosemide   Injectable 40 milliGRAM(s) IV Push every 12 hours  hydrALAZINE 100 milliGRAM(s) Oral three times a day  isosorbide   mononitrate ER Tablet (IMDUR) 90 milliGRAM(s) Oral daily  lactobacillus acidophilus 1 Tablet(s) Oral daily  lamoTRIgine 100 milliGRAM(s) Oral daily  melatonin 5 milliGRAM(s) Oral at bedtime  venlafaxine XR. 150 milliGRAM(s) Oral daily    MEDICATIONS  (PRN):  acetaminophen     Tablet .. 650 milliGRAM(s) Oral every 6 hours PRN Temp greater or equal to 38C (100.4F), Mild Pain (1 - 3)            Allergies    No Known Allergies    Intolerances        REVIEW OF SYSTEMS:  CONSTITUTIONAL: No fever or chills  HEENT:  No headache, no sore throat  RESPIRATORY: no SOB at rest, +VALDEZ (improving)  CARDIOVASCULAR: No chest pain, palpitations  GASTROINTESTINAL: No abd pain, nausea, vomiting, or diarrhea  GENITOURINARY: No dysuria, frequency, or hematuria  NEUROLOGICAL: no focal weakness or dizziness  MUSCULOSKELETAL: +leg swelling (improving) ; no myalgias     Vital Signs Last 24 Hrs  T(C): 36.9 (01-24-22 @ 13:20), Max: 36.9 (01-24-22 @ 13:20)  HR: 58 (01-24-22 @ 13:20) (58 - 80)  BP: 153/86 (01-24-22 @ 13:20) (118/66 - 174/90)  RR: 18 (01-24-22 @ 13:20)  SpO2: 98% (01-24-22 @ 13:20)    PHYSICAL EXAM:  GENERAL: NAD at rest on RA  HEENT:  anicteric, moist mucous membranes  CHEST/LUNG:  scant crackles b/l bases  HEART:  rrr, S1, S2, +murmur  ABDOMEN:  BS+, soft, nontender, nondistended  EXTREMITIES: + LE edema b/l, no cyanosis, or calf tenderness  NERVOUS SYSTEM: answers questions and follows commands appropriately    LABS:                                        7.9    3.94  )-----------( 188      ( 24 Jan 2022 10:34 )             25.3     CBC Full  -  ( 24 Jan 2022 10:34 )  WBC Count : 3.94 K/uL  Hemoglobin : 7.9 g/dL  Hematocrit : 25.3 %  Platelet Count - Automated : 188 K/uL  Mean Cell Volume : 86.3 fl  Mean Cell Hemoglobin : 27.0 pg  Mean Cell Hemoglobin Concentration : 31.2 gm/dL  Auto Neutrophil # : 2.66 K/uL  Auto Lymphocyte # : 0.85 K/uL  Auto Monocyte # : 0.30 K/uL  Auto Eosinophil # : 0.11 K/uL  Auto Basophil # : 0.01 K/uL  Auto Neutrophil % : 67.4 %  Auto Lymphocyte % : 21.6 %  Auto Monocyte % : 7.6 %  Auto Eosinophil % : 2.8 %  Auto Basophil % : 0.3 %    01-24    141  |  106  |  28<H>  ----------------------------<  169<H>  3.4<L>   |  28  |  2.50<H>    Ca    9.1      24 Jan 2022 10:34  Phos  4.1     01-24  Mg     2.3     01-24    TPro  5.9<L>  /  Alb  2.6<L>  /  TBili  0.2  /  DBili  x   /  AST  8<L>  /  ALT  17  /  AlkPhos  57  01-24          RADIOLOGY & ADDITIONAL TESTS:    Personally reviewed.     Consultant(s) Notes Reviewed:  [x] YES  [ ] NO

## 2022-01-24 NOTE — PROGRESS NOTE ADULT - ASSESSMENT
75yo M from group home, with PMH of DM2, HTN, HLD, CAD (s/p stents), Afib (on Eliquis), stage 3 CKD, hx of MGUS; recent admission for COVID PNA and ADHF, a/w acute hypoxic respiratory failure due to acute on chronic diastolic CHF.    Acute hypoxic respiratory failure due to acute on chronic diastolic CHF:  -of note, pt had recent admission with COVID and ADHF earlier this month - completed remdesivir and decadron course on that admission  -pt admits to using salt in his diet, but states he has been taking his diuretics at home - counseled on low salt intake  -will c/w lasix 40mg IV BID, may consider de-escalating tomorrow if continues to improve  -weaned off NC and now on RA at rest; will check ambulatory SpO2 on RA  -airborne/contact precautions  -Pulm following ([facility]), recs appreciated  -consult cardio ([facility]), recs appreciated    HTN:  - continue coreg, clonidine, norvasc, hydralazine, lasix  - monitor BP    ARIELLA on CKD 3:  - baseline Cr appears to be ~2.0-2.2 - renally dose meds, no nephrotoxics  - monitor closely on lasix; slow uptrend in Cr  - nephro ([facility]), recs appreciated    Anemia:  - pt with hx of MGUS and CKD likely contributing to anemia, along with current acute illness  - Hgb now stable at 8.0  - no gross hematuria, hematochezia, melena; monitor for signs of blood loss  - Retacrit per nephro orders for the renal failure component of anemia   - venofer 100mg IV q24h x3 doses as ferritin relatively low for current CKD and recent COVID infection     Afib:  - c/w Eliquis  - c/w coreg  - cardio ([facility]), recs appreciated    Type 2 DM:  - goal glucose 100-180  - monitor FSG   - c/w HISS    psych - continue abilify, lamictal, effexor    VTE ppx  - c/w Eliquis    Disp  - will likely need to go to [facility] on this discharge per SW discussion with pt's group home and PT recs

## 2022-01-24 NOTE — PROGRESS NOTE ADULT - ATTENDING COMMENTS
still vol ol. cont iv lasix. Please continue to maintain strict I/Os, monitor daily weights, Cr, and K. Further cardiac workup will depend on clinical course.

## 2022-01-25 LAB
ALBUMIN SERPL ELPH-MCNC: 2.8 G/DL — LOW (ref 3.3–5)
ALP SERPL-CCNC: 62 U/L — SIGNIFICANT CHANGE UP (ref 40–120)
ALT FLD-CCNC: 19 U/L — SIGNIFICANT CHANGE UP (ref 12–78)
ANION GAP SERPL CALC-SCNC: 5 MMOL/L — SIGNIFICANT CHANGE UP (ref 5–17)
AST SERPL-CCNC: 12 U/L — LOW (ref 15–37)
BILIRUB SERPL-MCNC: 0.3 MG/DL — SIGNIFICANT CHANGE UP (ref 0.2–1.2)
BUN SERPL-MCNC: 23 MG/DL — SIGNIFICANT CHANGE UP (ref 7–23)
CALCIUM SERPL-MCNC: 8.9 MG/DL — SIGNIFICANT CHANGE UP (ref 8.5–10.1)
CHLORIDE SERPL-SCNC: 105 MMOL/L — SIGNIFICANT CHANGE UP (ref 96–108)
CO2 SERPL-SCNC: 29 MMOL/L — SIGNIFICANT CHANGE UP (ref 22–31)
CREAT SERPL-MCNC: 2.3 MG/DL — HIGH (ref 0.5–1.3)
GLUCOSE SERPL-MCNC: 108 MG/DL — HIGH (ref 70–99)
HCT VFR BLD CALC: 25.9 % — LOW (ref 39–50)
HGB BLD-MCNC: 8.1 G/DL — LOW (ref 13–17)
MAGNESIUM SERPL-MCNC: 2.3 MG/DL — SIGNIFICANT CHANGE UP (ref 1.6–2.6)
MCHC RBC-ENTMCNC: 27.3 PG — SIGNIFICANT CHANGE UP (ref 27–34)
MCHC RBC-ENTMCNC: 31.3 GM/DL — LOW (ref 32–36)
MCV RBC AUTO: 87.2 FL — SIGNIFICANT CHANGE UP (ref 80–100)
NRBC # BLD: 0 /100 WBCS — SIGNIFICANT CHANGE UP (ref 0–0)
PLATELET # BLD AUTO: 209 K/UL — SIGNIFICANT CHANGE UP (ref 150–400)
POTASSIUM SERPL-MCNC: 3.3 MMOL/L — LOW (ref 3.5–5.3)
POTASSIUM SERPL-SCNC: 3.3 MMOL/L — LOW (ref 3.5–5.3)
PROT SERPL-MCNC: 6.4 G/DL — SIGNIFICANT CHANGE UP (ref 6–8.3)
RBC # BLD: 2.97 M/UL — LOW (ref 4.2–5.8)
RBC # FLD: 17.6 % — HIGH (ref 10.3–14.5)
SARS-COV-2 RNA SPEC QL NAA+PROBE: DETECTED
SODIUM SERPL-SCNC: 139 MMOL/L — SIGNIFICANT CHANGE UP (ref 135–145)
WBC # BLD: 5.09 K/UL — SIGNIFICANT CHANGE UP (ref 3.8–10.5)
WBC # FLD AUTO: 5.09 K/UL — SIGNIFICANT CHANGE UP (ref 3.8–10.5)

## 2022-01-25 PROCEDURE — 99233 SBSQ HOSP IP/OBS HIGH 50: CPT

## 2022-01-25 PROCEDURE — 99232 SBSQ HOSP IP/OBS MODERATE 35: CPT

## 2022-01-25 RX ORDER — POTASSIUM CHLORIDE 20 MEQ
40 PACKET (EA) ORAL EVERY 6 HOURS
Refills: 0 | Status: COMPLETED | OUTPATIENT
Start: 2022-01-25 | End: 2022-01-25

## 2022-01-25 RX ORDER — FUROSEMIDE 40 MG
40 TABLET ORAL
Refills: 0 | Status: DISCONTINUED | OUTPATIENT
Start: 2022-01-26 | End: 2022-01-26

## 2022-01-25 RX ADMIN — Medication 100 MILLIGRAM(S): at 05:15

## 2022-01-25 RX ADMIN — Medication 650 MILLIGRAM(S): at 23:02

## 2022-01-25 RX ADMIN — APIXABAN 5 MILLIGRAM(S): 2.5 TABLET, FILM COATED ORAL at 17:08

## 2022-01-25 RX ADMIN — Medication 40 MILLIEQUIVALENT(S): at 11:17

## 2022-01-25 RX ADMIN — Medication 4 MILLIGRAM(S): at 17:07

## 2022-01-25 RX ADMIN — Medication 4 MILLIGRAM(S): at 05:15

## 2022-01-25 RX ADMIN — BUDESONIDE AND FORMOTEROL FUMARATE DIHYDRATE 2 PUFF(S): 160; 4.5 AEROSOL RESPIRATORY (INHALATION) at 18:49

## 2022-01-25 RX ADMIN — Medication 81 MILLIGRAM(S): at 11:16

## 2022-01-25 RX ADMIN — Medication 100 MILLIGRAM(S): at 21:58

## 2022-01-25 RX ADMIN — Medication 0.2 MILLIGRAM(S): at 05:15

## 2022-01-25 RX ADMIN — FAMOTIDINE 20 MILLIGRAM(S): 10 INJECTION INTRAVENOUS at 11:17

## 2022-01-25 RX ADMIN — Medication 0.2 MILLIGRAM(S): at 21:58

## 2022-01-25 RX ADMIN — Medication 40 MILLIEQUIVALENT(S): at 10:21

## 2022-01-25 RX ADMIN — CARVEDILOL PHOSPHATE 6.25 MILLIGRAM(S): 80 CAPSULE, EXTENDED RELEASE ORAL at 05:15

## 2022-01-25 RX ADMIN — LAMOTRIGINE 100 MILLIGRAM(S): 25 TABLET, ORALLY DISINTEGRATING ORAL at 11:16

## 2022-01-25 RX ADMIN — ISOSORBIDE MONONITRATE 90 MILLIGRAM(S): 60 TABLET, EXTENDED RELEASE ORAL at 11:17

## 2022-01-25 RX ADMIN — Medication 0.2 MILLIGRAM(S): at 13:28

## 2022-01-25 RX ADMIN — Medication 40 MILLIGRAM(S): at 17:08

## 2022-01-25 RX ADMIN — Medication 150 MILLIGRAM(S): at 11:16

## 2022-01-25 RX ADMIN — Medication 1 TABLET(S): at 11:16

## 2022-01-25 RX ADMIN — CARVEDILOL PHOSPHATE 6.25 MILLIGRAM(S): 80 CAPSULE, EXTENDED RELEASE ORAL at 17:07

## 2022-01-25 RX ADMIN — Medication 5 MILLIGRAM(S): at 21:58

## 2022-01-25 RX ADMIN — Medication 1 MILLIGRAM(S): at 11:16

## 2022-01-25 RX ADMIN — AMLODIPINE BESYLATE 10 MILLIGRAM(S): 2.5 TABLET ORAL at 05:15

## 2022-01-25 RX ADMIN — APIXABAN 5 MILLIGRAM(S): 2.5 TABLET, FILM COATED ORAL at 05:15

## 2022-01-25 RX ADMIN — Medication 100 MILLIGRAM(S): at 13:28

## 2022-01-25 RX ADMIN — Medication 40 MILLIGRAM(S): at 05:16

## 2022-01-25 RX ADMIN — ATORVASTATIN CALCIUM 10 MILLIGRAM(S): 80 TABLET, FILM COATED ORAL at 21:58

## 2022-01-25 NOTE — PROGRESS NOTE ADULT - ASSESSMENT
76M from group home, with PMH of DM2, HTN, HLD, CAD (s/p stents), Afib (on Eliquis), stage 3 CKD, hx of MGUS    HFPEF  CKD  Anemia  Ex Smoker  COVID - viral pna  Hypoxemia  Anxious  CAD  AF    Cm notes reviewed -   RENAL eval noted - procrit added  cardio eval noted - LASIX dosing noted - monitor renal indices - I and O  cxr noted  proBNP noted  renal indices monitoring  cvs rx regimen optimization    remains covid pos -   isolation precs  completed Remdesivir - Decadron  monitor VS and Sat  fio2 support titration - keep sat > 88 pct  optimization of cvs rx regimen  cxr on admission - 1- - viral pna - worsening when compared to prior films  D dimer - serially - DVT p - on ELIQUIS  Consideration for Judicious Diuresis - As per Renal and Cardio  Anxiolytics  Emotional support

## 2022-01-25 NOTE — PROGRESS NOTE ADULT - TIME BILLING
Note written by attending, see above.  Time spent: 37min. More than 50% of the visit was spent counseling the patient on medical condition - acute hypoxic respiratory failure due to acute on chronic diastolic CHF, lasix, ARIELLA on CKD, anemia, Retacrit, venofer.
SW following for DC needs. SW in communication with CM.      No anticipated SW needs at this time.      Anneliese Magallanes, HAIDER  [facility]  [id number]    
Note written by attending, see above.  Time spent: 45min. More than 50% of the visit was spent counseling the patient on medical condition - acute hypoxic respiratory failure due to acute on chronic diastolic CHF, lasix, ARIELLA on CKD, anemia.
Note written by attending, see above.  Time spent: 37min. More than 50% of the visit was spent counseling the patient on medical condition - acute hypoxic respiratory failure due to acute on chronic diastolic CHF, lasix, ARIELLA on CKD, anemia.
Note written by attending, see above.  Time spent: 37min. More than 50% of the visit was spent counseling the patient on medical condition - acute hypoxic respiratory failure due to acute on chronic diastolic CHF, lasix, ARIELLA on CKD, anemia, Retacrit.

## 2022-01-25 NOTE — PROGRESS NOTE ADULT - ASSESSMENT
77yo M from group home, with PMH of DM2, HTN, HLD, CAD (s/p stents), Afib (on Eliquis), stage 3 CKD, hx of MGUS; recent admission for COVID PNA and ADHF, a/w acute hypoxic respiratory failure due to acute on chronic diastolic CHF.    Acute hypoxic respiratory failure due to acute on chronic diastolic CHF:  -of note, pt had recent admission with COVID and ADHF earlier this month - completed remdesivir and decadron course on that admission  -pt admits to using salt in his diet, but states he has been taking his diuretics at home - counseled on low salt intake  -weaned off NC and now on RA at rest and with ambulation   -will c/w lasix 40mg IV BID today and will change to lasix 40mg po BID tomorrow   -airborne/contact precautions  -Pulm following ([facility]), recs appreciated  -consult cardio ([facility]), recs appreciated    HTN:  - continue coreg, clonidine, norvasc, hydralazine, lasix  - monitor BP    ARIELLA on CKD 3:  - baseline Cr appears to be ~2.0-2.2 - renally dose meds, no nephrotoxics  - monitor closely on lasix; slow uptrend in Cr  - nephro ([facility]), recs appreciated    Anemia:  - pt with hx of MGUS and CKD likely contributing to anemia, along with current acute illness  - Hgb now stable at 8.0  - no gross hematuria, hematochezia, melena; monitor for signs of blood loss  - Retacrit per nephro orders for the renal failure component of anemia   - venofer 100mg IV q24h x3 doses as ferritin relatively low for current CKD and recent COVID infection     Afib:  - c/w Eliquis  - c/w coreg  - cardio ([facility]), recs appreciated    Type 2 DM:  - goal glucose 100-180  - monitor FSG   - c/w HISS    psych - continue abilify, lamictal, effexor    VTE ppx  - c/w Eliquis    Disp  - per SW discussion, pt's group home now considering accepting him back -- possible d/c tomorrow if stable   75yo M from group home, with PMH of DM2, HTN, HLD, CAD (s/p stents), Afib (on Eliquis), stage 3 CKD, hx of MGUS; recent admission for COVID PNA and ADHF, a/w acute hypoxic respiratory failure due to acute on chronic diastolic CHF.    Acute hypoxic respiratory failure due to acute on chronic diastolic CHF:  -of note, pt had recent admission with COVID and ADHF earlier this month - completed remdesivir and decadron course on that admission  -pt admits to using salt in his diet, but states he has been taking his diuretics at home - counseled on low salt intake  -weaned off NC and now on RA at rest and with ambulation   -will c/w lasix 40mg IV BID today and will change to lasix 40mg po BID tomorrow   -airborne/contact precautions  -Pulm following ([facility]), recs appreciated  -consult cardio ([facility]), recs appreciated    HTN:  - continue coreg, clonidine, norvasc, hydralazine, lasix  - monitor BP    ARIELLA on CKD 3:  - baseline Cr appears to be ~2.0-2.2 - renally dose meds, no nephrotoxics  - monitor closely on lasix; slow uptrend in Cr  - nephro ([facility]), recs appreciated    Anemia:  - pt with hx of MGUS and CKD likely contributing to anemia, along with current acute illness  - Hgb now stable at ~8.0  - no gross hematuria, hematochezia, melena; monitor for signs of blood loss  - Retacrit per nephro orders for the renal failure component of anemia   - venofer 100mg IV q24h x3 doses as ferritin relatively low for current CKD and recent COVID infection     Afib:  - c/w Eliquis  - c/w coreg  - cardio ([facility]), recs appreciated    Type 2 DM:  - goal glucose 100-180  - monitor FSG   - c/w HISS    psych - continue abilify, lamictal, effexor    VTE ppx  - c/w Eliquis    Disp  - per SW discussion, pt's group home now considering accepting him back -- possible d/c tomorrow if stable

## 2022-01-25 NOTE — PROGRESS NOTE ADULT - SUBJECTIVE AND OBJECTIVE BOX
Patient is a 76y old  Male who presents with a chief complaint of worsening SOB.                INTERVAL HPI/OVERNIGHT EVENTS: Pt states SOB at rest has resolved. Pt admits VALDEZ that is improving. Now on RA at rest and with ambulation. Pt denies CP, fever, chills, palpitations.     MEDICATIONS  (STANDING):  amLODIPine   Tablet 10 milliGRAM(s) Oral daily  apixaban 5 milliGRAM(s) Oral two times a day  aspirin enteric coated 81 milliGRAM(s) Oral daily  atorvastatin 10 milliGRAM(s) Oral at bedtime  budesonide 160 MICROgram(s)/formoterol 4.5 MICROgram(s) Inhaler 2 Puff(s) Inhalation two times a day  carvedilol 6.25 milliGRAM(s) Oral every 12 hours  cloNIDine 0.2 milliGRAM(s) Oral three times a day  cyanocobalamin 1000 MICROGram(s) Oral daily  doxazosin 4 milliGRAM(s) Oral <User Schedule>  epoetin michael-epbx (RETACRIT) Injectable 31236 Unit(s) SubCutaneous <User Schedule>  famotidine    Tablet 20 milliGRAM(s) Oral daily  folic acid 1 milliGRAM(s) Oral daily  furosemide   Injectable 40 milliGRAM(s) IV Push every 12 hours  hydrALAZINE 100 milliGRAM(s) Oral three times a day  isosorbide   mononitrate ER Tablet (IMDUR) 90 milliGRAM(s) Oral daily  lactobacillus acidophilus 1 Tablet(s) Oral daily  lamoTRIgine 100 milliGRAM(s) Oral daily  melatonin 5 milliGRAM(s) Oral at bedtime  venlafaxine XR. 150 milliGRAM(s) Oral daily    MEDICATIONS  (PRN):  acetaminophen     Tablet .. 650 milliGRAM(s) Oral every 6 hours PRN Temp greater or equal to 38C (100.4F), Mild Pain (1 - 3)            Allergies    No Known Allergies    Intolerances        REVIEW OF SYSTEMS:  CONSTITUTIONAL: No fever or chills  HEENT:  No headache, no sore throat  RESPIRATORY: no SOB at rest, +VALDEZ (improving)  CARDIOVASCULAR: No chest pain, palpitations  GASTROINTESTINAL: No abd pain, nausea, vomiting, or diarrhea  GENITOURINARY: No dysuria, frequency, or hematuria  NEUROLOGICAL: no focal weakness or dizziness  MUSCULOSKELETAL: +leg swelling (improving) ; no myalgias     Vital Signs Last 24 Hrs  T(C): 36.4 (25 Jan 2022 04:44), Max: 36.9 (24 Jan 2022 13:20)  T(F): 97.6 (25 Jan 2022 04:44), Max: 98.4 (24 Jan 2022 13:20)  HR: 66 (25 Jan 2022 04:44) (58 - 67)  BP: 155/85 (25 Jan 2022 04:44) (141/88 - 155/85)  BP(mean): --  RR: 17 (25 Jan 2022 04:44) (17 - 18)  SpO2: 93% (25 Jan 2022 04:44) (93% - 98%)    PHYSICAL EXAM:  GENERAL: NAD at rest on RA  HEENT:  anicteric, moist mucous membranes  CHEST/LUNG: CTA b/l  HEART:  rrr, S1, S2, +murmur  ABDOMEN:  BS+, soft, nontender, nondistended  EXTREMITIES: 1+ LE edema b/l, no cyanosis, or calf tenderness  NERVOUS SYSTEM: answers questions and follows commands appropriately    LABS:                                                   8.1    5.09  )-----------( 209      ( 25 Jan 2022 07:45 )             25.9     CBC Full  -  ( 25 Jan 2022 07:45 )  WBC Count : 5.09 K/uL  Hemoglobin : 8.1 g/dL  Hematocrit : 25.9 %  Platelet Count - Automated : 209 K/uL  Mean Cell Volume : 87.2 fl  Mean Cell Hemoglobin : 27.3 pg  Mean Cell Hemoglobin Concentration : 31.3 gm/dL  Auto Neutrophil # : x  Auto Lymphocyte # : x  Auto Monocyte # : x  Auto Eosinophil # : x  Auto Basophil # : x  Auto Neutrophil % : x  Auto Lymphocyte % : x  Auto Monocyte % : x  Auto Eosinophil % : x  Auto Basophil % : x    01-25    139  |  105  |  23  ----------------------------<  108<H>  3.3<L>   |  29  |  2.30<H>    Ca    8.9      25 Jan 2022 07:45  Phos  4.1     01-24  Mg     2.3     01-25    TPro  6.4  /  Alb  2.8<L>  /  TBili  0.3  /  DBili  x   /  AST  12<L>  /  ALT  19  /  AlkPhos  62  01-25      TPro  5.9<L>  /  Alb  2.6<L>  /  TBili  0.2  /  DBili  x   /  AST  8<L>  /  ALT  17  /  AlkPhos  57  01-24          RADIOLOGY & ADDITIONAL TESTS:    Personally reviewed.     Consultant(s) Notes Reviewed:  [x] YES  [ ] NO     Patient is a 76y old  Male who presents with a chief complaint of worsening SOB.                 INTERVAL HPI/OVERNIGHT EVENTS: Pt states SOB at rest has resolved. Pt admits VALDEZ that is improving. Now on RA at rest and with ambulation. Pt denies CP, fever, chills, palpitations.     MEDICATIONS  (STANDING):  amLODIPine   Tablet 10 milliGRAM(s) Oral daily  apixaban 5 milliGRAM(s) Oral two times a day  aspirin enteric coated 81 milliGRAM(s) Oral daily  atorvastatin 10 milliGRAM(s) Oral at bedtime  budesonide 160 MICROgram(s)/formoterol 4.5 MICROgram(s) Inhaler 2 Puff(s) Inhalation two times a day  carvedilol 6.25 milliGRAM(s) Oral every 12 hours  cloNIDine 0.2 milliGRAM(s) Oral three times a day  cyanocobalamin 1000 MICROGram(s) Oral daily  doxazosin 4 milliGRAM(s) Oral <User Schedule>  epoetin michael-epbx (RETACRIT) Injectable 18974 Unit(s) SubCutaneous <User Schedule>  famotidine    Tablet 20 milliGRAM(s) Oral daily  folic acid 1 milliGRAM(s) Oral daily  furosemide   Injectable 40 milliGRAM(s) IV Push every 12 hours  hydrALAZINE 100 milliGRAM(s) Oral three times a day  isosorbide   mononitrate ER Tablet (IMDUR) 90 milliGRAM(s) Oral daily  lactobacillus acidophilus 1 Tablet(s) Oral daily  lamoTRIgine 100 milliGRAM(s) Oral daily  melatonin 5 milliGRAM(s) Oral at bedtime  venlafaxine XR. 150 milliGRAM(s) Oral daily    MEDICATIONS  (PRN):  acetaminophen     Tablet .. 650 milliGRAM(s) Oral every 6 hours PRN Temp greater or equal to 38C (100.4F), Mild Pain (1 - 3)            Allergies    No Known Allergies    Intolerances        REVIEW OF SYSTEMS:  CONSTITUTIONAL: No fever or chills  HEENT:  No headache, no sore throat  RESPIRATORY: no SOB at rest, +VALDEZ (improving)  CARDIOVASCULAR: No chest pain, palpitations  GASTROINTESTINAL: No abd pain, nausea, vomiting, or diarrhea  GENITOURINARY: No dysuria, frequency, or hematuria  NEUROLOGICAL: no focal weakness or dizziness  MUSCULOSKELETAL: +leg swelling (improving) ; no myalgias     Vital Signs Last 24 Hrs  T(C): 36.4 (25 Jan 2022 04:44), Max: 36.9 (24 Jan 2022 13:20)  T(F): 97.6 (25 Jan 2022 04:44), Max: 98.4 (24 Jan 2022 13:20)  HR: 66 (25 Jan 2022 04:44) (58 - 67)  BP: 155/85 (25 Jan 2022 04:44) (141/88 - 155/85)  BP(mean): --  RR: 17 (25 Jan 2022 04:44) (17 - 18)  SpO2: 93% (25 Jan 2022 04:44) (93% - 98%)    PHYSICAL EXAM:  GENERAL: NAD at rest on RA  HEENT:  anicteric, moist mucous membranes  CHEST/LUNG: CTA b/l  HEART:  rrr, S1, S2, +murmur  ABDOMEN:  BS+, soft, nontender, nondistended  EXTREMITIES: 1+ LE edema b/l, no cyanosis, or calf tenderness  NERVOUS SYSTEM: answers questions and follows commands appropriately    LABS:                                                   8.1    5.09  )-----------( 209      ( 25 Jan 2022 07:45 )             25.9     CBC Full  -  ( 25 Jan 2022 07:45 )  WBC Count : 5.09 K/uL  Hemoglobin : 8.1 g/dL  Hematocrit : 25.9 %  Platelet Count - Automated : 209 K/uL  Mean Cell Volume : 87.2 fl  Mean Cell Hemoglobin : 27.3 pg  Mean Cell Hemoglobin Concentration : 31.3 gm/dL  Auto Neutrophil # : x  Auto Lymphocyte # : x  Auto Monocyte # : x  Auto Eosinophil # : x  Auto Basophil # : x  Auto Neutrophil % : x  Auto Lymphocyte % : x  Auto Monocyte % : x  Auto Eosinophil % : x  Auto Basophil % : x    01-25    139  |  105  |  23  ----------------------------<  108<H>  3.3<L>   |  29  |  2.30<H>    Ca    8.9      25 Jan 2022 07:45  Phos  4.1     01-24  Mg     2.3     01-25    TPro  6.4  /  Alb  2.8<L>  /  TBili  0.3  /  DBili  x   /  AST  12<L>  /  ALT  19  /  AlkPhos  62  01-25      TPro  5.9<L>  /  Alb  2.6<L>  /  TBili  0.2  /  DBili  x   /  AST  8<L>  /  ALT  17  /  AlkPhos  57  01-24          RADIOLOGY & ADDITIONAL TESTS:    Personally reviewed.     Consultant(s) Notes Reviewed:  [x] YES  [ ] NO

## 2022-01-25 NOTE — PROGRESS NOTE ADULT - ASSESSMENT
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 Pt was recently d/c for acute hypoxic respiratory failure 2/2 covid-19 pneumonia 1/3-1/18. Pt states he has become increasing short of breath since he was discharged. Pt states he has also had mild headaches w dry cough with increased anxiety since d/c 3 days ago. Pt denies fevers, chills, chest pain, palpitations, n/v/d/c.    HFpEF/afib/ cad/ htn   can change to lasix PO, ambulated to bathroom with no dyspnea, now with trace LE edema, on RA   strict intake and output-   TTE 01/2022 EF 55%, mild AS, mild MR/TR - no need to repeat  continue Coreg, Statin, clonidine, norvasc, hydralazine isosorbide   Continue Eliquis for thromboembolism for afib   Anemia work up as per primary , given CAD keep hct >26   Monitor and replete lytes, keep K>4 and Mg >2   Further cardiac workup will depend on clinical course.    All other workup per primary team  Carmelina Legre FNP-C  Cardiology NP  [id number] [phone]

## 2022-01-25 NOTE — PROGRESS NOTE ADULT - SUBJECTIVE AND OBJECTIVE BOX
[facility] -- Fifi Bliss, Kalpesh Valdovinos, Abraham Sheridan Savella  Office # [phone]      Follow Up:    LE Edema   Subjective/Observations:     No events overnight resting comfortably in bed.  No complaints of chest pain, dyspnea, or palpitations reported. No signs of orthopnea or PND.  ambulated to bathroom with no dyspnea, remains on room air   REVIEW OF SYSTEMS: All other review of systems is negative unless indicated above    PAST MEDICAL & SURGICAL HISTORY:  HTN (hypertension)  c/b multiple episodes of hypertensive urgency    HLD (hyperlipidemia)    Atrial fibrillation  first documented on EKG 10/7/2021    CAD (coronary artery disease)  s/p stents (not on plavix)    Type 2 diabetes mellitus  not on home insulin/ Meds    Anxiety  Depression  multiple psych medications    History of diverticulitis  07/2021    Diverticulosis  c/b GIB in 2020    Afib  on AC    Stage 3 chronic kidney disease    Anemia of chronic disease  Monoclonal Gammopathy-MGUS  pRBC transfusion 10/15/21    Chronic diastolic congestive heart failure    Multiple thyroid nodules    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  Had surgery ( April 2013 )    S/P tonsillectomy    S/P arthroscopic knee surgery  Bilateral ( 2005 )    Torsion of testicle  Had surgery at age 14    Pilonidal cyst  Had surgery ( 1969 )    S/P cataract surgery  Bilateral    H/O hernia repair        MEDICATIONS  (STANDING):  amLODIPine   Tablet 10 milliGRAM(s) Oral daily  apixaban 5 milliGRAM(s) Oral two times a day  aspirin enteric coated 81 milliGRAM(s) Oral daily  atorvastatin 10 milliGRAM(s) Oral at bedtime  budesonide 160 MICROgram(s)/formoterol 4.5 MICROgram(s) Inhaler 2 Puff(s) Inhalation two times a day  carvedilol 6.25 milliGRAM(s) Oral every 12 hours  cloNIDine 0.2 milliGRAM(s) Oral three times a day  cyanocobalamin 1000 MICROGram(s) Oral daily  doxazosin 4 milliGRAM(s) Oral <User Schedule>  epoetin michael-epbx (RETACRIT) Injectable 75507 Unit(s) SubCutaneous <User Schedule>  famotidine    Tablet 20 milliGRAM(s) Oral daily  folic acid 1 milliGRAM(s) Oral daily  furosemide   Injectable 40 milliGRAM(s) IV Push every 12 hours  hydrALAZINE 100 milliGRAM(s) Oral three times a day  isosorbide   mononitrate ER Tablet (IMDUR) 90 milliGRAM(s) Oral daily  lactobacillus acidophilus 1 Tablet(s) Oral daily  lamoTRIgine 100 milliGRAM(s) Oral daily  melatonin 5 milliGRAM(s) Oral at bedtime  venlafaxine XR. 150 milliGRAM(s) Oral daily    MEDICATIONS  (PRN):  acetaminophen     Tablet .. 650 milliGRAM(s) Oral every 6 hours PRN Temp greater or equal to 38C (100.4F), Mild Pain (1 - 3)      Allergies    No Known Allergies    Intolerances        Vital Signs Last 24 Hrs  T(C): 36.4 (25 Jan 2022 04:44), Max: 36.9 (24 Jan 2022 13:20)  T(F): 97.6 (25 Jan 2022 04:44), Max: 98.4 (24 Jan 2022 13:20)  HR: 66 (25 Jan 2022 04:44) (58 - 67)  BP: 155/85 (25 Jan 2022 04:44) (141/88 - 155/85)  BP(mean): --  RR: 17 (25 Jan 2022 04:44) (17 - 18)  SpO2: 93% (25 Jan 2022 04:44) (93% - 98%)    I&O's Summary    24 Jan 2022 07:01  -  25 Jan 2022 07:00  --------------------------------------------------------  IN: 300 mL / OUT: 0 mL / NET: 300 mL          PHYSICAL EXAM:  TELE: not on tele   Constitutional: NAD, awake and alert, obese  HEENT: Moist Mucous Membranes, Anicteric  Pulmonary: Non-labored, breath sounds are clear bilaterally, No wheezing, crackles or rhonchi  Cardiovascular: Regular, S1 and S2 nl, murmur   Gastrointestinal: Bowel Sounds present, soft, nontender.   Lymph: trace LE peripheral edema.  Skin: No visible rashes or ulcers.  Psych:  Mood & affect appropriate    LABS: All Labs Reviewed:                        8.1    5.09  )-----------( 209      ( 25 Jan 2022 07:45 )             25.9                         7.9    3.94  )-----------( 188      ( 24 Jan 2022 10:34 )             25.3                         8.0    4.55  )-----------( 205      ( 23 Jan 2022 08:50 )             25.0     25 Jan 2022 07:45    139    |  105    |  23     ----------------------------<  108    3.3     |  29     |  2.30   24 Jan 2022 10:34    141    |  106    |  28     ----------------------------<  169    3.4     |  28     |  2.50   23 Jan 2022 08:50    142    |  109    |  31     ----------------------------<  112    3.8     |  25     |  2.30     Ca    8.9        25 Jan 2022 07:45  Ca    9.1        24 Jan 2022 10:34  Ca    8.2        23 Jan 2022 08:50  Phos  4.1       24 Jan 2022 10:34  Phos  4.3       23 Jan 2022 08:50  Mg     2.3       25 Jan 2022 07:45  Mg     2.3       24 Jan 2022 10:34  Mg     2.6       23 Jan 2022 08:50    TPro  6.4    /  Alb  2.8    /  TBili  0.3    /  DBili  x      /  AST  12     /  ALT  19     /  AlkPhos  62     25 Jan 2022 07:45  TPro  5.9    /  Alb  2.6    /  TBili  0.2    /  DBili  x      /  AST  8      /  ALT  17     /  AlkPhos  57     24 Jan 2022 10:34  TPro  6.0    /  Alb  2.5    /  TBili  0.2    /  DBili  x      /  AST  11     /  ALT  20     /  AlkPhos  57     23 Jan 2022 08:50               TTE Echo Complete w/o Contrast w/ Doppler (01.06.22 @ 10:52) >    OBSERVATIONS:  Technically difficult study  Mitral Valve: Mitral annular calcification with thickened leaflets, mild   MR.  Aortic Valve/Aorta: Calcified trileaflet aortic valve with decreased   opening. Peak transaortic valve gradient is 29.4 mmHg with a mean   transaortic valve gradient 14.7 mmHg. This consistent with mild aortic   stenosis.  Tricuspid Valve: Mild TR.  Pulmonic Valve: Not well-visualized  Left Atrium: Enlarged  Right Atrium: Not well-visualized  Left Ventricle: Moderate left ventricular hypertrophy with normal   systolic function, estimated LVEF of 55%.  Right Ventricle: Grossly normal size and systolic function.  Pericardium: no significant pericardial effusion.  Pulmonary/RV Pressure: estimated PA systolic pressure of 32mmHg        IMPRESSION:  Technically difficult study  Moderate left ventricular hypertrophy with normal systolic function,   estimated LVEF of 55%.  Grossly normal RV size and systolic function.  Calcified trileaflet aortic valve with mild aortic stenosis  Mild MR andTR.  No significant pericardial effusion.

## 2022-01-25 NOTE — PROGRESS NOTE ADULT - SUBJECTIVE AND OBJECTIVE BOX
Date/Time Patient Seen:  		  Referring MD:   Data Reviewed	       Patient is a 77y old  Male who presents with a chief complaint of Dyspnea (24 Jan 2022 17:31)      Subjective/HPI     PAST MEDICAL & SURGICAL HISTORY:  Hypertension    Diabetes mellitus    Lupus    Hepatitis C    Anxiety and depression    CAD (coronary artery disease)  s/p stents    Diverticulosis    Hyperlipidemia    HTN (hypertension)  c/b multiple episodes of hypertensive urgency    HLD (hyperlipidemia)    Atrial fibrillation  first documented on EKG 10/7/2021    CAD (coronary artery disease)  s/p stents (not on plavix)    Type 2 diabetes mellitus  not on home insulin/ Meds    Anxiety  Depression  multiple psych medications    History of diverticulitis  07/2021    Diverticulosis  c/b GIB in 2020    Afib  on AC    Stage 3 chronic kidney disease    Anemia of chronic disease  Monoclonal Gammopathy-MGUS  pRBC transfusion 10/15/21    Chronic diastolic congestive heart failure    Multiple thyroid nodules    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  Had surgery ( April 2013 )    S/P tonsillectomy    S/P arthroscopic knee surgery  Bilateral ( 2005 )    Torsion of testicle  Had surgery at age 14    Pilonidal cyst  Had surgery ( 1969 )    S/P cataract surgery  Bilateral    H/O hernia repair          Medication list         MEDICATIONS  (STANDING):  amLODIPine   Tablet 10 milliGRAM(s) Oral daily  apixaban 5 milliGRAM(s) Oral two times a day  aspirin enteric coated 81 milliGRAM(s) Oral daily  atorvastatin 10 milliGRAM(s) Oral at bedtime  budesonide 160 MICROgram(s)/formoterol 4.5 MICROgram(s) Inhaler 2 Puff(s) Inhalation two times a day  carvedilol 6.25 milliGRAM(s) Oral every 12 hours  cloNIDine 0.2 milliGRAM(s) Oral three times a day  cyanocobalamin 1000 MICROGram(s) Oral daily  doxazosin 4 milliGRAM(s) Oral <User Schedule>  epoetin michael-epbx (RETACRIT) Injectable 19579 Unit(s) SubCutaneous <User Schedule>  famotidine    Tablet 20 milliGRAM(s) Oral daily  folic acid 1 milliGRAM(s) Oral daily  furosemide   Injectable 40 milliGRAM(s) IV Push every 12 hours  hydrALAZINE 100 milliGRAM(s) Oral three times a day  isosorbide   mononitrate ER Tablet (IMDUR) 90 milliGRAM(s) Oral daily  lactobacillus acidophilus 1 Tablet(s) Oral daily  lamoTRIgine 100 milliGRAM(s) Oral daily  melatonin 5 milliGRAM(s) Oral at bedtime  venlafaxine XR. 150 milliGRAM(s) Oral daily    MEDICATIONS  (PRN):  acetaminophen     Tablet .. 650 milliGRAM(s) Oral every 6 hours PRN Temp greater or equal to 38C (100.4F), Mild Pain (1 - 3)         Vitals log        ICU Vital Signs Last 24 Hrs  T(C): 36.4 (25 Jan 2022 04:44), Max: 36.9 (24 Jan 2022 13:20)  T(F): 97.6 (25 Jan 2022 04:44), Max: 98.4 (24 Jan 2022 13:20)  HR: 66 (25 Jan 2022 04:44) (58 - 67)  BP: 155/85 (25 Jan 2022 04:44) (141/88 - 155/85)  BP(mean): --  ABP: --  ABP(mean): --  RR: 17 (25 Jan 2022 04:44) (17 - 18)  SpO2: 93% (25 Jan 2022 04:44) (93% - 98%)           Input and Output:  I&O's Detail    23 Jan 2022 07:01  -  24 Jan 2022 07:00  --------------------------------------------------------  IN:  Total IN: 0 mL    OUT:    Voided (mL): 500 mL  Total OUT: 500 mL    Total NET: -500 mL      24 Jan 2022 07:01  -  25 Jan 2022 05:04  --------------------------------------------------------  IN:    Oral Fluid: 300 mL  Total IN: 300 mL    OUT:  Total OUT: 0 mL    Total NET: 300 mL          Lab Data                        7.9    3.94  )-----------( 188      ( 24 Jan 2022 10:34 )             25.3     01-24    141  |  106  |  28<H>  ----------------------------<  169<H>  3.4<L>   |  28  |  2.50<H>    Ca    9.1      24 Jan 2022 10:34  Phos  4.1     01-24  Mg     2.3     01-24    TPro  5.9<L>  /  Alb  2.6<L>  /  TBili  0.2  /  DBili  x   /  AST  8<L>  /  ALT  17  /  AlkPhos  57  01-24            Review of Systems	      Objective     Physical Examination  heart s1s2  lung dec BS  abd soft        Pertinent Lab findings & Imaging      Nikko:  NO   Adequate UO     I&O's Detail    23 Jan 2022 07:01  -  24 Jan 2022 07:00  --------------------------------------------------------  IN:  Total IN: 0 mL    OUT:    Voided (mL): 500 mL  Total OUT: 500 mL    Total NET: -500 mL      24 Jan 2022 07:01  -  25 Jan 2022 05:04  --------------------------------------------------------  IN:    Oral Fluid: 300 mL  Total IN: 300 mL    OUT:  Total OUT: 0 mL    Total NET: 300 mL               Discussed with:     Cultures:	        Radiology

## 2022-01-25 NOTE — PROGRESS NOTE ADULT - SUBJECTIVE AND OBJECTIVE BOX
Patient is a 76y old  Male who presents with a chief complaint of CHF exacerbation (05 Jan 2022 12:02)    Patient seen in follow up for CKD 4, Anemia.        PAST MEDICAL HISTORY:  Hypertension    Diabetes mellitus    Lupus    Hepatitis C    Anxiety and depression    CAD (coronary artery disease)    Diverticulosis    Hyperlipidemia    HTN (hypertension)    HLD (hyperlipidemia)    Atrial fibrillation    CAD (coronary artery disease)    Type 2 diabetes mellitus    Anxiety    History of diverticulitis    Diverticulosis    Afib    Stage 3 chronic kidney disease    Anemia of chronic disease    Chronic diastolic congestive heart failure    Multiple thyroid nodules      MEDICATIONS  (STANDING):  amLODIPine   Tablet 10 milliGRAM(s) Oral daily  apixaban 5 milliGRAM(s) Oral two times a day  aspirin enteric coated 81 milliGRAM(s) Oral daily  atorvastatin 10 milliGRAM(s) Oral at bedtime  budesonide 160 MICROgram(s)/formoterol 4.5 MICROgram(s) Inhaler 2 Puff(s) Inhalation two times a day  carvedilol 6.25 milliGRAM(s) Oral every 12 hours  cloNIDine 0.2 milliGRAM(s) Oral three times a day  cyanocobalamin 1000 MICROGram(s) Oral daily  doxazosin 4 milliGRAM(s) Oral <User Schedule>  epoetin micheal-epbx (RETACRIT) Injectable 96810 Unit(s) SubCutaneous <User Schedule>  famotidine    Tablet 20 milliGRAM(s) Oral daily  folic acid 1 milliGRAM(s) Oral daily  furosemide   Injectable 40 milliGRAM(s) IV Push every 12 hours  hydrALAZINE 100 milliGRAM(s) Oral three times a day  isosorbide   mononitrate ER Tablet (IMDUR) 90 milliGRAM(s) Oral daily  lactobacillus acidophilus 1 Tablet(s) Oral daily  lamoTRIgine 100 milliGRAM(s) Oral daily  melatonin 5 milliGRAM(s) Oral at bedtime  potassium chloride    Tablet ER 40 milliEquivalent(s) Oral every 6 hours  venlafaxine XR. 150 milliGRAM(s) Oral daily    MEDICATIONS  (PRN):  acetaminophen     Tablet .. 650 milliGRAM(s) Oral every 6 hours PRN Temp greater or equal to 38C (100.4F), Mild Pain (1 - 3)    T(C): 36.4 (01-25-22 @ 04:44), Max: 36.9 (01-24-22 @ 13:20)  HR: 66 (01-25-22 @ 04:44) (58 - 80)  BP: 155/85 (01-25-22 @ 04:44) (126/70 - 161/89)  RR: 17 (01-25-22 @ 04:44)  SpO2: 93% (01-25-22 @ 04:44)  Wt(kg): --  I&O's Detail    24 Jan 2022 07:01  -  25 Jan 2022 07:00  --------------------------------------------------------  IN:    Oral Fluid: 300 mL  Total IN: 300 mL    OUT:  Total OUT: 0 mL    Total NET: 300 mL                    PHYSICAL EXAM:  General: No distress  Respiratory: b/l air entry  Cardiovascular: S1 S2  Gastrointestinal: soft  Extremities:  + edema                            LABORATORY:                        8.1    5.09  )-----------( 209      ( 25 Jan 2022 07:45 )             25.9     01-25    139  |  105  |  23  ----------------------------<  108<H>  3.3<L>   |  29  |  2.30<H>    Ca    8.9      25 Jan 2022 07:45  Phos  4.1     01-24  Mg     2.3     01-25    TPro  6.4  /  Alb  2.8<L>  /  TBili  0.3  /  DBili  x   /  AST  12<L>  /  ALT  19  /  AlkPhos  62  01-25    Sodium, Serum: 139 mmol/L (01-25 @ 07:45)  Sodium, Serum: 141 mmol/L (01-24 @ 10:34)    Potassium, Serum: 3.3 mmol/L (01-25 @ 07:45)  Potassium, Serum: 3.4 mmol/L (01-24 @ 10:34)    Hemoglobin: 8.1 g/dL (01-25 @ 07:45)  Hemoglobin: 7.9 g/dL (01-24 @ 10:34)  Hemoglobin: 8.0 g/dL (01-23 @ 08:50)    Creatinine, Serum 2.30 (01-25 @ 07:45)  Creatinine, Serum 2.50 (01-24 @ 10:34)  Creatinine, Serum 2.30 (01-23 @ 08:50)        LIVER FUNCTIONS - ( 25 Jan 2022 07:45 )  Alb: 2.8 g/dL / Pro: 6.4 g/dL / ALK PHOS: 62 U/L / ALT: 19 U/L / AST: 12 U/L / GGT: x

## 2022-01-25 NOTE — PROGRESS NOTE ADULT - PROBLEM SELECTOR PROBLEM 1
Acute exacerbation of CHF (congestive heart failure)

## 2022-01-25 NOTE — PROGRESS NOTE ADULT - ASSESSMENT
CKD 4  Diabetes  CHF  Hypertension  h/o SLE  Hypokalemia  Anemia  COVID +, Pneumonia    Renal indices stable. To continue current meds. Retacrit for anemia. Monitor blood sugar levels. Insulin coverage as needed. Monitor h/h trend.   Potassium supplementation. Monitor BP trend. Titrate BP meds as needed. Salt restriction. On IV lasix. PO fluid restriction.   Will follow electrolytes and renal function trend. Avoid nephrotoxic meds as possible.

## 2022-01-26 ENCOUNTER — TRANSCRIPTION ENCOUNTER (OUTPATIENT)
Age: 77
End: 2022-01-26

## 2022-01-26 VITALS
SYSTOLIC BLOOD PRESSURE: 149 MMHG | DIASTOLIC BLOOD PRESSURE: 80 MMHG | HEART RATE: 72 BPM | RESPIRATION RATE: 18 BRPM | TEMPERATURE: 98 F | OXYGEN SATURATION: 95 %

## 2022-01-26 LAB
ANION GAP SERPL CALC-SCNC: 7 MMOL/L — SIGNIFICANT CHANGE UP (ref 5–17)
BUN SERPL-MCNC: 21 MG/DL — SIGNIFICANT CHANGE UP (ref 7–23)
CALCIUM SERPL-MCNC: 8.6 MG/DL — SIGNIFICANT CHANGE UP (ref 8.5–10.1)
CHLORIDE SERPL-SCNC: 109 MMOL/L — HIGH (ref 96–108)
CO2 SERPL-SCNC: 25 MMOL/L — SIGNIFICANT CHANGE UP (ref 22–31)
CREAT SERPL-MCNC: 2.3 MG/DL — HIGH (ref 0.5–1.3)
GLUCOSE SERPL-MCNC: 116 MG/DL — HIGH (ref 70–99)
HCT VFR BLD CALC: 26.9 % — LOW (ref 39–50)
HGB BLD-MCNC: 8.3 G/DL — LOW (ref 13–17)
MCHC RBC-ENTMCNC: 26.9 PG — LOW (ref 27–34)
MCHC RBC-ENTMCNC: 30.9 GM/DL — LOW (ref 32–36)
MCV RBC AUTO: 87.3 FL — SIGNIFICANT CHANGE UP (ref 80–100)
NRBC # BLD: 0 /100 WBCS — SIGNIFICANT CHANGE UP (ref 0–0)
PLATELET # BLD AUTO: 199 K/UL — SIGNIFICANT CHANGE UP (ref 150–400)
POTASSIUM SERPL-MCNC: 3.8 MMOL/L — SIGNIFICANT CHANGE UP (ref 3.5–5.3)
POTASSIUM SERPL-SCNC: 3.8 MMOL/L — SIGNIFICANT CHANGE UP (ref 3.5–5.3)
RBC # BLD: 3.08 M/UL — LOW (ref 4.2–5.8)
RBC # FLD: 18.2 % — HIGH (ref 10.3–14.5)
SODIUM SERPL-SCNC: 141 MMOL/L — SIGNIFICANT CHANGE UP (ref 135–145)
WBC # BLD: 4 K/UL — SIGNIFICANT CHANGE UP (ref 3.8–10.5)
WBC # FLD AUTO: 4 K/UL — SIGNIFICANT CHANGE UP (ref 3.8–10.5)

## 2022-01-26 PROCEDURE — 80053 COMPREHEN METABOLIC PANEL: CPT

## 2022-01-26 PROCEDURE — 97116 GAIT TRAINING THERAPY: CPT

## 2022-01-26 PROCEDURE — 87635 SARS-COV-2 COVID-19 AMP PRB: CPT

## 2022-01-26 PROCEDURE — 85379 FIBRIN DEGRADATION QUANT: CPT

## 2022-01-26 PROCEDURE — 94640 AIRWAY INHALATION TREATMENT: CPT

## 2022-01-26 PROCEDURE — 86900 BLOOD TYPING SEROLOGIC ABO: CPT

## 2022-01-26 PROCEDURE — 84100 ASSAY OF PHOSPHORUS: CPT

## 2022-01-26 PROCEDURE — 86850 RBC ANTIBODY SCREEN: CPT

## 2022-01-26 PROCEDURE — 84484 ASSAY OF TROPONIN QUANT: CPT

## 2022-01-26 PROCEDURE — 85027 COMPLETE CBC AUTOMATED: CPT

## 2022-01-26 PROCEDURE — 83880 ASSAY OF NATRIURETIC PEPTIDE: CPT

## 2022-01-26 PROCEDURE — 93005 ELECTROCARDIOGRAM TRACING: CPT

## 2022-01-26 PROCEDURE — 99232 SBSQ HOSP IP/OBS MODERATE 35: CPT

## 2022-01-26 PROCEDURE — 80061 LIPID PANEL: CPT

## 2022-01-26 PROCEDURE — 97162 PT EVAL MOD COMPLEX 30 MIN: CPT

## 2022-01-26 PROCEDURE — 86140 C-REACTIVE PROTEIN: CPT

## 2022-01-26 PROCEDURE — 0225U NFCT DS DNA&RNA 21 SARSCOV2: CPT

## 2022-01-26 PROCEDURE — 86901 BLOOD TYPING SEROLOGIC RH(D): CPT

## 2022-01-26 PROCEDURE — 80048 BASIC METABOLIC PNL TOTAL CA: CPT

## 2022-01-26 PROCEDURE — 99285 EMERGENCY DEPT VISIT HI MDM: CPT

## 2022-01-26 PROCEDURE — 83735 ASSAY OF MAGNESIUM: CPT

## 2022-01-26 PROCEDURE — 85025 COMPLETE CBC W/AUTO DIFF WBC: CPT

## 2022-01-26 PROCEDURE — 84145 PROCALCITONIN (PCT): CPT

## 2022-01-26 PROCEDURE — 71045 X-RAY EXAM CHEST 1 VIEW: CPT

## 2022-01-26 PROCEDURE — 82728 ASSAY OF FERRITIN: CPT

## 2022-01-26 PROCEDURE — 99239 HOSP IP/OBS DSCHRG MGMT >30: CPT | Mod: GC

## 2022-01-26 PROCEDURE — 36415 COLL VENOUS BLD VENIPUNCTURE: CPT

## 2022-01-26 RX ORDER — ONDANSETRON 8 MG/1
4 TABLET, FILM COATED ORAL ONCE
Refills: 0 | Status: COMPLETED | OUTPATIENT
Start: 2022-01-26 | End: 2022-01-26

## 2022-01-26 RX ADMIN — Medication 0.2 MILLIGRAM(S): at 13:12

## 2022-01-26 RX ADMIN — CARVEDILOL PHOSPHATE 6.25 MILLIGRAM(S): 80 CAPSULE, EXTENDED RELEASE ORAL at 06:14

## 2022-01-26 RX ADMIN — Medication 150 MILLIGRAM(S): at 11:03

## 2022-01-26 RX ADMIN — Medication 100 MILLIGRAM(S): at 13:12

## 2022-01-26 RX ADMIN — PREGABALIN 1000 MICROGRAM(S): 225 CAPSULE ORAL at 11:03

## 2022-01-26 RX ADMIN — ONDANSETRON 4 MILLIGRAM(S): 8 TABLET, FILM COATED ORAL at 11:42

## 2022-01-26 RX ADMIN — FAMOTIDINE 20 MILLIGRAM(S): 10 INJECTION INTRAVENOUS at 11:03

## 2022-01-26 RX ADMIN — Medication 1 TABLET(S): at 11:03

## 2022-01-26 RX ADMIN — ISOSORBIDE MONONITRATE 90 MILLIGRAM(S): 60 TABLET, EXTENDED RELEASE ORAL at 11:03

## 2022-01-26 RX ADMIN — Medication 100 MILLIGRAM(S): at 06:14

## 2022-01-26 RX ADMIN — Medication 40 MILLIGRAM(S): at 06:14

## 2022-01-26 RX ADMIN — LAMOTRIGINE 100 MILLIGRAM(S): 25 TABLET, ORALLY DISINTEGRATING ORAL at 11:03

## 2022-01-26 RX ADMIN — AMLODIPINE BESYLATE 10 MILLIGRAM(S): 2.5 TABLET ORAL at 06:14

## 2022-01-26 RX ADMIN — Medication 81 MILLIGRAM(S): at 11:03

## 2022-01-26 RX ADMIN — Medication 4 MILLIGRAM(S): at 06:14

## 2022-01-26 RX ADMIN — Medication 1 MILLIGRAM(S): at 11:03

## 2022-01-26 RX ADMIN — APIXABAN 5 MILLIGRAM(S): 2.5 TABLET, FILM COATED ORAL at 06:14

## 2022-01-26 RX ADMIN — Medication 0.2 MILLIGRAM(S): at 06:14

## 2022-01-26 NOTE — PROGRESS NOTE ADULT - SUBJECTIVE AND OBJECTIVE BOX
[facility] -- Fifi Bliss, Bonifacio, Kalpesh, Abraham Sheridan Savella  Office # [phone]      Follow Up:  SOB     Subjective/Observations: No events overnight resting comfortably in bed.  No complaints of chest pain, dyspnea, or palpitations reported. No signs of orthopnea or PND.      REVIEW OF SYSTEMS: All other review of systems is negative unless indicated above    PAST MEDICAL & SURGICAL HISTORY:  HTN (hypertension)  c/b multiple episodes of hypertensive urgency    HLD (hyperlipidemia)    Atrial fibrillation  first documented on EKG 10/7/2021    CAD (coronary artery disease)  s/p stents (not on plavix)    Type 2 diabetes mellitus  not on home insulin/ Meds    Anxiety  Depression  multiple psych medications    History of diverticulitis  07/2021    Diverticulosis  c/b GIB in 2020    Afib  on AC    Stage 3 chronic kidney disease    Anemia of chronic disease  Monoclonal Gammopathy-MGUS  pRBC transfusion 10/15/21    Chronic diastolic congestive heart failure    Multiple thyroid nodules    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  Had surgery ( April 2013 )    S/P tonsillectomy    S/P arthroscopic knee surgery  Bilateral ( 2005 )    Torsion of testicle  Had surgery at age 14    Pilonidal cyst  Had surgery ( 1969 )    S/P cataract surgery  Bilateral    H/O hernia repair        MEDICATIONS  (STANDING):  amLODIPine   Tablet 10 milliGRAM(s) Oral daily  apixaban 5 milliGRAM(s) Oral two times a day  aspirin enteric coated 81 milliGRAM(s) Oral daily  atorvastatin 10 milliGRAM(s) Oral at bedtime  budesonide 160 MICROgram(s)/formoterol 4.5 MICROgram(s) Inhaler 2 Puff(s) Inhalation two times a day  carvedilol 6.25 milliGRAM(s) Oral every 12 hours  cloNIDine 0.2 milliGRAM(s) Oral three times a day  cyanocobalamin 1000 MICROGram(s) Oral daily  doxazosin 4 milliGRAM(s) Oral <User Schedule>  epoetin michael-epbx (RETACRIT) Injectable 38424 Unit(s) SubCutaneous <User Schedule>  famotidine    Tablet 20 milliGRAM(s) Oral daily  folic acid 1 milliGRAM(s) Oral daily  furosemide    Tablet 40 milliGRAM(s) Oral two times a day  hydrALAZINE 100 milliGRAM(s) Oral three times a day  isosorbide   mononitrate ER Tablet (IMDUR) 90 milliGRAM(s) Oral daily  lactobacillus acidophilus 1 Tablet(s) Oral daily  lamoTRIgine 100 milliGRAM(s) Oral daily  melatonin 5 milliGRAM(s) Oral at bedtime  venlafaxine XR. 150 milliGRAM(s) Oral daily    MEDICATIONS  (PRN):  acetaminophen     Tablet .. 650 milliGRAM(s) Oral every 6 hours PRN Temp greater or equal to 38C (100.4F), Mild Pain (1 - 3)      Allergies    No Known Allergies    Intolerances      Vital Signs Last 24 Hrs  T(C): 36.3 (26 Jan 2022 04:24), Max: 36.7 (25 Jan 2022 13:05)  T(F): 97.4 (26 Jan 2022 04:24), Max: 98.1 (25 Jan 2022 20:38)  HR: 63 (26 Jan 2022 04:24) (63 - 67)  BP: 175/80 (26 Jan 2022 04:24) (150/75 - 175/80)  BP(mean): --  RR: 18 (26 Jan 2022 04:24) (16 - 18)  SpO2: 94% (26 Jan 2022 04:24) (94% - 98%)    I&O's Summary    25 Jan 2022 07:01  -  26 Jan 2022 07:00  --------------------------------------------------------  IN: 240 mL / OUT: 550 mL / NET: -310 mL    26 Jan 2022 07:01  -  26 Jan 2022 10:14  --------------------------------------------------------  IN: 0 mL / OUT: 200 mL / NET: -200 mL          PHYSICAL EXAM:  TELE: no tele currently   Constitutional: NAD, awake and alert, well-developed  HEENT: Moist Mucous Membranes, Anicteric  Pulmonary: Non-labored, breath sounds are clear bilaterally, No wheezing, crackles or rhonchi  Cardiovascular: irregular, S1 and S2 nl, No murmurs, rubs, gallops or clicks  Gastrointestinal: Bowel Sounds present, soft, nontender.   Lymph: No lymphadenopathy. +2 LE edema.  Skin: No visible rashes or ulcers.  Psych:  Mood & affect appropriate      LABS: All Labs Reviewed:                        8.3    4.00  )-----------( 199      ( 26 Jan 2022 09:09 )             26.9                         8.1    5.09  )-----------( 209      ( 25 Jan 2022 07:45 )             25.9                         7.9    3.94  )-----------( 188      ( 24 Jan 2022 10:34 )             25.3     26 Jan 2022 09:09    141    |  109    |  21     ----------------------------<  116    3.8     |  25     |  2.30   25 Jan 2022 07:45    139    |  105    |  23     ----------------------------<  108    3.3     |  29     |  2.30   24 Jan 2022 10:34    141    |  106    |  28     ----------------------------<  169    3.4     |  28     |  2.50     Ca    8.6        26 Jan 2022 09:09  Ca    8.9        25 Jan 2022 07:45  Ca    9.1        24 Jan 2022 10:34  Phos  4.1       24 Jan 2022 10:34  Mg     2.3       25 Jan 2022 07:45  Mg     2.3       24 Jan 2022 10:34    TPro  6.4    /  Alb  2.8    /  TBili  0.3    /  DBili  x      /  AST  12     /  ALT  19     /  AlkPhos  62     25 Jan 2022 07:45  TPro  5.9    /  Alb  2.6    /  TBili  0.2    /  DBili  x      /  AST  8      /  ALT  17     /  AlkPhos  57     24 Jan 2022 10:34             ECG: < from: 12 Lead ECG (01.21.22 @ 09:46) >  Diagnosis Line Atrial fibrillation  Moderate voltage criteria for LVH, may be normal variant  Nonspecific ST and T wave abnormality  Abnormal ECG  When compared with ECG of 03-JAN-2022 10:49,  ST now depressed in Lateral leads  Confirmed by Bonifacio BARRERA, Victorino ([id number]) on 1/21/2022 2:38:45 PM    < end of copied text >      Echo: < from: TTE Echo Complete w/o Contrast w/ Doppler (01.06.22 @ 10:52) >  MPRESSION:  Technically difficult study  Moderate left ventricular hypertrophy with normal systolic function,   estimated LVEF of 55%.  Grossly normal RV size and systolic function.  Calcified trileaflet aortic valve with mild aortic stenosis  Mild MR andTR.  No significant pericardial effusion.    < end of copied text >      Radiology: < from: Xray Chest 1 View-PORTABLE IMMEDIATE (01.21.22 @ 10:08) >  Increasing bilateral infiltrates on the basis of pulmonary edema or   pneumonia.    < end of copied text >

## 2022-01-26 NOTE — DISCHARGE NOTE NURSING/CASE MANAGEMENT/SOCIAL WORK - NSDCVIVACCINE_GEN_ALL_CORE_FT
Tdap; 24-Jul-2020 09:31; Valdez Watson (RN); [employer]; [id number] (Exp. Date: 17-Sep-2021); IntraMuscular; Deltoid Left.; 0.5 milliLiter(s); VIS (VIS Published: 09-May-2013, VIS Presented: 24-Jul-2020);

## 2022-01-26 NOTE — PROGRESS NOTE ADULT - SUBJECTIVE AND OBJECTIVE BOX
Date/Time Patient Seen:  		  Referring MD:   Data Reviewed	       Patient is a 77y old  Male who presents with a chief complaint of Dyspnea (25 Jan 2022 11:22)      Subjective/HPI     PAST MEDICAL & SURGICAL HISTORY:  Hypertension    Diabetes mellitus    Lupus    Hepatitis C    Anxiety and depression    CAD (coronary artery disease)  s/p stents    Diverticulosis    Hyperlipidemia    HTN (hypertension)  c/b multiple episodes of hypertensive urgency    HLD (hyperlipidemia)    Atrial fibrillation  first documented on EKG 10/7/2021    CAD (coronary artery disease)  s/p stents (not on plavix)    Type 2 diabetes mellitus  not on home insulin/ Meds    Anxiety  Depression  multiple psych medications    History of diverticulitis  07/2021    Diverticulosis  c/b GIB in 2020    Afib  on AC    Stage 3 chronic kidney disease    Anemia of chronic disease  Monoclonal Gammopathy-MGUS  pRBC transfusion 10/15/21    Chronic diastolic congestive heart failure    Multiple thyroid nodules    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  Had surgery ( April 2013 )    S/P tonsillectomy    S/P arthroscopic knee surgery  Bilateral ( 2005 )    Torsion of testicle  Had surgery at age 14    Pilonidal cyst  Had surgery ( 1969 )    S/P cataract surgery  Bilateral    H/O hernia repair          Medication list         MEDICATIONS  (STANDING):  amLODIPine   Tablet 10 milliGRAM(s) Oral daily  apixaban 5 milliGRAM(s) Oral two times a day  aspirin enteric coated 81 milliGRAM(s) Oral daily  atorvastatin 10 milliGRAM(s) Oral at bedtime  budesonide 160 MICROgram(s)/formoterol 4.5 MICROgram(s) Inhaler 2 Puff(s) Inhalation two times a day  carvedilol 6.25 milliGRAM(s) Oral every 12 hours  cloNIDine 0.2 milliGRAM(s) Oral three times a day  cyanocobalamin 1000 MICROGram(s) Oral daily  doxazosin 4 milliGRAM(s) Oral <User Schedule>  epoetin michael-epbx (RETACRIT) Injectable 20940 Unit(s) SubCutaneous <User Schedule>  famotidine    Tablet 20 milliGRAM(s) Oral daily  folic acid 1 milliGRAM(s) Oral daily  furosemide    Tablet 40 milliGRAM(s) Oral two times a day  hydrALAZINE 100 milliGRAM(s) Oral three times a day  isosorbide   mononitrate ER Tablet (IMDUR) 90 milliGRAM(s) Oral daily  lactobacillus acidophilus 1 Tablet(s) Oral daily  lamoTRIgine 100 milliGRAM(s) Oral daily  melatonin 5 milliGRAM(s) Oral at bedtime  venlafaxine XR. 150 milliGRAM(s) Oral daily    MEDICATIONS  (PRN):  acetaminophen     Tablet .. 650 milliGRAM(s) Oral every 6 hours PRN Temp greater or equal to 38C (100.4F), Mild Pain (1 - 3)         Vitals log        ICU Vital Signs Last 24 Hrs  T(C): 36.3 (26 Jan 2022 04:24), Max: 36.7 (25 Jan 2022 13:05)  T(F): 97.4 (26 Jan 2022 04:24), Max: 98.1 (25 Jan 2022 20:38)  HR: 63 (26 Jan 2022 04:24) (63 - 67)  BP: 175/80 (26 Jan 2022 04:24) (150/75 - 175/80)  BP(mean): --  ABP: --  ABP(mean): --  RR: 18 (26 Jan 2022 04:24) (16 - 18)  SpO2: 94% (26 Jan 2022 04:24) (94% - 98%)           Input and Output:  I&O's Detail    24 Jan 2022 07:01  -  25 Jan 2022 07:00  --------------------------------------------------------  IN:    Oral Fluid: 300 mL  Total IN: 300 mL    OUT:  Total OUT: 0 mL    Total NET: 300 mL      25 Jan 2022 07:01  -  26 Jan 2022 06:14  --------------------------------------------------------  IN:    Oral Fluid: 240 mL  Total IN: 240 mL    OUT:    Voided (mL): 550 mL  Total OUT: 550 mL    Total NET: -310 mL          Lab Data                        8.1    5.09  )-----------( 209      ( 25 Jan 2022 07:45 )             25.9     01-25    139  |  105  |  23  ----------------------------<  108<H>  3.3<L>   |  29  |  2.30<H>    Ca    8.9      25 Jan 2022 07:45  Phos  4.1     01-24  Mg     2.3     01-25    TPro  6.4  /  Alb  2.8<L>  /  TBili  0.3  /  DBili  x   /  AST  12<L>  /  ALT  19  /  AlkPhos  62  01-25            Review of Systems	      Objective     Physical Examination    heart s1s2  lung dec BS  abd soft      Pertinent Lab findings & Imaging      Nikko:  NO   Adequate UO     I&O's Detail    24 Jan 2022 07:01  -  25 Jan 2022 07:00  --------------------------------------------------------  IN:    Oral Fluid: 300 mL  Total IN: 300 mL    OUT:  Total OUT: 0 mL    Total NET: 300 mL      25 Jan 2022 07:01  -  26 Jan 2022 06:14  --------------------------------------------------------  IN:    Oral Fluid: 240 mL  Total IN: 240 mL    OUT:    Voided (mL): 550 mL  Total OUT: 550 mL    Total NET: -310 mL               Discussed with:     Cultures:	        Radiology

## 2022-01-26 NOTE — PROGRESS NOTE ADULT - REASON FOR ADMISSION
Dyspnea
acute hypoxic respiratory failure due to CHF exacerbation
Dyspnea
acute hypoxic respiratory failure due to CHF exacerbation
acute hypoxic respiratory failure due to CHF exacerbation
acute hypoxic and hypercarbic respiratory failure due to CHF exacerbation and COVID-19 PNA

## 2022-01-26 NOTE — DISCHARGE NOTE PROVIDER - CARE PROVIDERS DIRECT ADDRESSES
,DirectAddress_Unknown,DirectAddress_Unknown,DirectAddress_Unknown,DirectAddress_Unknown,DirectAddress_Unknown ,DirectAddress_Unknown,DirectAddress_Unknown,DirectAddress_Unknown,DirectAddress_Unknown,[email or web address]

## 2022-01-26 NOTE — DISCHARGE NOTE PROVIDER - NSDCCPCAREPLAN_GEN_ALL_CORE_FT
PRINCIPAL DISCHARGE DIAGNOSIS  Diagnosis: Acute exacerbation of CHF (congestive heart failure)  Assessment and Plan of Treatment: You presented to the hospital with shortness of breath and were found to be in an acute heart failure exacerbation. Congestive heart failure is a condition in which the heart is unable to either pump or relax efficiently. Regardless of the cause, the end result is that the heart is unable to move a sufficient amount of blood throughout the body. Untreated, this condition can lead to swelling of the legs, difficulty breathing, fluid accumulating in the lungs, blood not reaching the organs, and even death. While in the hospital, your condition was constantly monitored and medically optimized.   On discharge, it is extremely important and recommended that you take your medications as prescribed and that you follow up with your primary care provider and cardiologist (if you have one) within one week.      SECONDARY DISCHARGE DIAGNOSES  Diagnosis: 2019 novel coronavirus disease (COVID-19)  Assessment and Plan of Treatment:      PRINCIPAL DISCHARGE DIAGNOSIS  Diagnosis: Acute exacerbation of CHF (congestive heart failure)  Assessment and Plan of Treatment: You presented to the hospital with shortness of breath and were found to be in an acute heart failure exacerbation. Congestive heart failure is a condition in which the heart is unable to either pump or relax efficiently. Regardless of the cause, the end result is that the heart is unable to move a sufficient amount of blood throughout the body. Untreated, this condition can lead to swelling of the legs, difficulty breathing, fluid accumulating in the lungs, blood not reaching the organs, and even death. While in the hospital, your condition was constantly monitored and medically optimized.   On discharge, it is extremely important and recommended that you take your medications as prescribed and that you follow up with your primary care provider and cardiologist (if you have one) within one week.      SECONDARY DISCHARGE DIAGNOSES  Diagnosis: Anemia  Assessment and Plan of Treatment: You had anemia while in the hospital likely due to your chronic kidney disease and history of MGUS. You were given Epogen and venofer while in the hospital.   To Do:  -  - Please follow up with PCP for further reccomendations.    Diagnosis: Chronic kidney disease (CKD)  Assessment and Plan of Treatment: Your kidneys act as your body's blood filter. Sometimes, in states of dehydration, some of the kidney cells do not receive appropriate blood flow - in these situations, the kidney will become injured and have a decrease in function. Your acute kidney injury (ARIELLA) was treated with appropriate rehydration, avoidance of medications that would injure the kidney, and with constant monitoring of blood tests that tell us about your kidneys. At discharge, your kidney injury has resolved. It is important that when you leave the hospital you inform your primary care doctor about the issue and follow up with the doctor within one week of discharge.    Diagnosis: Anxiety  Assessment and Plan of Treatment: You were previously diagnosed with anxiety. Please continue to take your home medications and follow up with your PCP for further management.    Diagnosis: T2DM (type 2 diabetes mellitus)  Assessment and Plan of Treatment: Diabetes is a condition where the levels of sugar in the blood cannot be controlled through the body's normal mechanisms. Blood sugars that are too high or too low can cause serious side effects - high blood sugars can cause life-threatening states, issues with blood flow to the extremities, non-healing wounds, and other issues; whereas low blood sugars can cause a coma, fainting, or other dangerous issues. It is important that you continue all your medications when you leave the hospital and continue to follow up with your primary care doctor after leaving the hospital.    Diagnosis: Atrial fibrillation  Assessment and Plan of Treatment: Atrial fibrillation is a condition where the different parts of the heart do not beat in time with each other. These changes can lead to serious issues, including clots that form in the heart and changes in the heart rate and rhythm where your heart might beat too fast. It is important that you take your prescribed medications as instructed to avoid clots forming and to make sure your heart does not beat too fast. It is also very important that you follow up with your outpatient cardiologist after discharge within a week to make sure your medications are working effectively.    Diagnosis: HTN (hypertension)  Assessment and Plan of Treatment: Hypertension, or high blood pressure, is a condition where your blood pressures are too high. Over time, this can lead to problems with your heart, kidney, blood vessels, and other organs. Uncontrolled hypertension can also lead to major adverse events like strokes and bleeds. During your hospitalization, your blood pressures were constantly monitored and treated with medication. It is very important that you continue your medications as prescribed and make a follow up appointment with your primary care physician to address changes made to your regimen as soon as you leave the hospital.     PRINCIPAL DISCHARGE DIAGNOSIS  Diagnosis: Acute exacerbation of CHF (congestive heart failure)  Assessment and Plan of Treatment: You presented to the hospital with shortness of breath and were found to be in an acute heart failure exacerbation. Congestive heart failure is a condition in which the heart is unable to either pump or relax efficiently. Regardless of the cause, the end result is that the heart is unable to move a sufficient amount of blood throughout the body. Untreated, this condition can lead to swelling of the legs, difficulty breathing, fluid accumulating in the lungs, blood not reaching the organs, and even death. While in the hospital, your condition was constantly monitored and medically optimized for acute on chronic diastolic CHF.    On discharge, it is extremely important and recommended that you take your medications as prescribed and that you follow up with your primary care provider and cardiologist within one week. Please have either provider obtain a BMP (basic metabolic panel) to assure stable renal indices and potassium levels within 2-3 days of discharge.      SECONDARY DISCHARGE DIAGNOSES  Diagnosis: Chronic kidney disease (CKD)  Assessment and Plan of Treatment: Your kidneys act as your body's blood filter. Sometimes, in states of dehydration, some of the kidney cells do not receive appropriate blood flow - in these situations, the kidney will become injured and have a decrease in function. Your acute kidney injury (ARIELLA) on ckd 3 was treated with appropriate rehydration, avoidance of medications that would injure the kidney, and with constant monitoring of blood tests that tell us about your kidneys. At discharge, your kidney injury has resolved. It is important that when you leave the hospital you inform your primary care doctor about the issue and follow up with the doctor within one week of discharge.    Diagnosis: Anxiety  Assessment and Plan of Treatment: You were previously diagnosed with anxiety. Please continue to take your home medications and follow up with your PCP for further management.    Diagnosis: T2DM (type 2 diabetes mellitus)  Assessment and Plan of Treatment: Diabetes is a condition where the levels of sugar in the blood cannot be controlled through the body's normal mechanisms. Blood sugars that are too high or too low can cause serious side effects - high blood sugars can cause life-threatening states, issues with blood flow to the extremities, non-healing wounds, and other issues; whereas low blood sugars can cause a coma, fainting, or other dangerous issues. It is important that you continue all your medications when you leave the hospital and continue to follow up with your primary care doctor after leaving the hospital.    Diagnosis: Atrial fibrillation  Assessment and Plan of Treatment: Atrial fibrillation is a condition where the different parts of the heart do not beat in time with each other. These changes can lead to serious issues, including clots that form in the heart and changes in the heart rate and rhythm where your heart might beat too fast. It is important that you take your prescribed medications as instructed to avoid clots forming and to make sure your heart does not beat too fast. It is also very important that you follow up with your outpatient cardiologist after discharge within a week to make sure your medications are working effectively for chronic afib. Follow up with cardiology wihtin 1 week of discharge for further care and management    Diagnosis: HTN (hypertension)  Assessment and Plan of Treatment: Hypertension, or high blood pressure, is a condition where your blood pressures are too high. Over time, this can lead to problems with your heart, kidney, blood vessels, and other organs. Uncontrolled hypertension can also lead to major adverse events like strokes and bleeds. During your hospitalization, your blood pressures were constantly monitored and treated with medication. It is very important that you continue your medications as prescribed and make a follow up appointment with your primary care physician to address changes made to your regimen as soon as you leave the hospital. follow up with your primary medical provider for further care and management within 1 week of discharge    Diagnosis: Anemia  Assessment and Plan of Treatment: You had anemia while in the hospital likely due to your chronic kidney disease and history of MGUS. You were given Epogen and venofer while in the hospital.   To Do:  -  - Please follow up with PCP for further reccomendations wihtin 1 week of discharge for eval and further management, you may need a CBC within 1 week of discharge to assure stable blood counts

## 2022-01-26 NOTE — DISCHARGE NOTE PROVIDER - HOSPITAL COURSE
FROM ADMISSION H+P:   HPI:  78 yo M PMH of A fib (on Eliquis) , MGUS s/p PRBC transfusion 10/21, anxiety/depression, CAD s/p stents (not on plavix), CHF, Diverticulosis, HLD, HTN, thyroid nodules, CKD stage 3, DM2 BIBEMS for worsening sob. Pt was recently d/c for acute hypoxic respiratory failure 2/2 covid-19 pneumonia 1/3-1/18. Pt states he has become increasing short of breath since he was d/c 3 days ago. Pt states he has also had mild headaches w dry cough. pt states he has increased anxiety since d/c 3 days ago. Pt denies fevers, chills, chest pain, palpitations, n/v/d/c.    ED:  Vitals: BP: 144/78; HR 70; T 36.9; RR 17; O2 96% on 2L NC   Labs: Hb 9.1; D-dimer 283; BUN/Cr 33/2.40; ; Procal 0.34; covid-19 positive   ECG: Afib @ 6p bpm   Imaging: cxr (my read); worsening R pulm infiltrates compared to prior.          (21 Jan 2022 12:33)      ---  HOSPITAL COURSE: Patient presented to the ED with shortness of breath and was admitted for acute on chronic CHF. He was diuresed with IV lasix and improved throughout the course of his hospitalization. Patient successfully transitioned to PO lasix. ECHO was not repeated as it was recently done early January and pt has EF of 55% from that ECHO. Patient showed improvement throughout hospitalization. Patient was seen and examined on day of discharge. Patient was medically optimized for discharge with close outpatient follow up.     ---  CONSULTANTS:   Pulmonology - Dr. Day   Nephrology - Dr. Rodarte   Cardio- Dr. Monterroso    ---  TIME SPENT:  I, the attending physician, was physically present for the key portions of the evaluation and management (E/M) service provided. The total amount of time spent reviewing the hospital notes, laboratory values, imaging findings, assessing/counseling the patient, discussing with consultant physicians, social work, nursing staff was -- minutes    ---  Primary care provider was made aware of plan for discharge:      [  ] NO     [  ] YES   FROM ADMISSION H+P:   HPI:  76 yo M PMH of A fib (on Eliquis) , MGUS s/p PRBC transfusion 10/21, anxiety/depression, CAD s/p stents (not on plavix), CHF, Diverticulosis, HLD, HTN, thyroid nodules, CKD stage 3, DM2 BIBEMS for worsening sob. Pt was recently d/c for acute hypoxic respiratory failure 2/2 covid-19 pneumonia 1/3-1/18. Pt states he has become increasing short of breath since he was d/c 3 days ago. Pt states he has also had mild headaches w dry cough. pt states he has increased anxiety since d/c 3 days ago. Pt denies fevers, chills, chest pain, palpitations, n/v/d/c.    ED:  Vitals: BP: 144/78; HR 70; T 36.9; RR 17; O2 96% on 2L NC   Labs: Hb 9.1; D-dimer 283; BUN/Cr 33/2.40; ; Procal 0.34; covid-19 positive   ECG: Afib @ 6p bpm   Imaging: cxr (my read); worsening R pulm infiltrates compared to prior.          (21 Jan 2022 12:33)      ---  HOSPITAL COURSE: Patient presented to the ED with shortness of breath and was admitted for acute on chronic CHF. He was diuresed with IV lasix and improved throughout the course of his hospitalization. Patient successfully transitioned to PO lasix. ECHO was not repeated as it was recently done early January and pt has EF of 55% from that ECHO. Patient had ARIELLA on CKD likely 2/2 fluid overload, which improved w/ diuresis. Patient showed improvement throughout hospitalization. Patient was seen and examined on day of discharge. Patient was medically optimized for discharge with close outpatient follow up.     T(C): 36.3 (01-26-22 @ 04:24), Max: 36.7 (01-25-22 @ 13:05)  HR: 63 (01-26-22 @ 04:24) (63 - 67)  BP: 175/80 (01-26-22 @ 04:24) (150/75 - 175/80)  RR: 18 (01-26-22 @ 04:24) (16 - 18)  SpO2: 94% (01-26-22 @ 04:24) (94% - 98%)  PHYSICAL EXAM:  GENERAL: NAD at rest on RA  HEENT:  anicteric, moist mucous membranes  CHEST/LUNG: CTA b/l  HEART:  rrr, S1, S2, +murmur  ABDOMEN:  BS+, soft, nontender, nondistended  EXTREMITIES: 2+ pitting LE edema b/l, no cyanosis, or calf tenderness  NERVOUS SYSTEM: AOx3 - answers questions and follows commands appropriately    ---  CONSULTANTS:   Pulmonology - Dr. Day   Nephrology - Dr. Rodarte   Cardio- Dr. Monterroso    ---  TIME SPENT:  I, the attending physician, was physically present for the key portions of the evaluation and management (E/M) service provided. The total amount of time spent reviewing the hospital notes, laboratory values, imaging findings, assessing/counseling the patient, discussing with consultant physicians, social work, nursing staff was -- minutes    ---  Primary care provider was made aware of plan for discharge:      [  ] NO     [  ] YES   FROM ADMISSION H+P:   HPI:  76 yo M PMH of A fib (on Eliquis) , MGUS s/p PRBC transfusion 10/21, anxiety/depression, CAD s/p stents (not on plavix), CHF, Diverticulosis, HLD, HTN, thyroid nodules, CKD stage 3, DM2 BIBEMS for worsening sob. Pt was recently d/c for acute hypoxic respiratory failure 2/2 covid-19 pneumonia 1/3-1/18. Pt states he has become increasing short of breath since he was d/c 3 days ago. Pt states he has also had mild headaches w dry cough. pt states he has increased anxiety since d/c 3 days ago. Pt denies fevers, chills, chest pain, palpitations, n/v/d/c.    ED:  Vitals: BP: 144/78; HR 70; T 36.9; RR 17; O2 96% on 2L NC   Labs: Hb 9.1; D-dimer 283; BUN/Cr 33/2.40; ; Procal 0.34; covid-19 positive   ECG: Afib @ 6p bpm   Imaging: cxr (my read); worsening R pulm infiltrates compared to prior.          (21 Jan 2022 12:33)      ---  HOSPITAL COURSE: Patient presented to the ED with shortness of breath and was admitted for acute on chronic CHF. He was diuresed with IV lasix and improved throughout the course of his hospitalization. Patient successfully transitioned back to home PO lasix. ECHO was not repeated as it was recently done early January and pt has EF of 55% from that ECHO. Patient had ARIELLA on CKD likely 2/2 fluid overload, which improved w/ diuresis. Patient showed improvement throughout hospitalization. Patient was seen and examined on day of discharge. Patient was medically optimized for discharge with close outpatient follow up.     T(C): 36.3 (01-26-22 @ 04:24), Max: 36.7 (01-25-22 @ 13:05)  HR: 63 (01-26-22 @ 04:24) (63 - 67)  BP: 175/80 (01-26-22 @ 04:24) (150/75 - 175/80)  RR: 18 (01-26-22 @ 04:24) (16 - 18)  SpO2: 94% (01-26-22 @ 04:24) (94% - 98%)  PHYSICAL EXAM:  GENERAL: NAD at rest on RA  HEENT:  anicteric, moist mucous membranes  CHEST/LUNG: CTA b/l  HEART:  rrr, S1, S2, +murmur  ABDOMEN:  BS+, soft, nontender, nondistended  EXTREMITIES: 2+ pitting LE edema b/l, no cyanosis, or calf tenderness  NERVOUS SYSTEM: AOx3 - answers questions and follows commands appropriately    ---  CONSULTANTS:   Pulmonology - Dr. Day   Nephrology - Dr. Rodarte   Cardio- Dr. Monterroso    ---  TIME SPENT:  I, the attending physician, was physically present for the key portions of the evaluation and management (E/M) service provided. The total amount of time spent reviewing the hospital notes, laboratory values, imaging findings, assessing/counseling the patient, discussing with consultant physicians, social work, nursing staff was -- minutes    ---  Primary care provider was made aware of plan for discharge:      [  ] NO     [  ] YES   FROM ADMISSION H+P:   HPI:  78 yo M PMH of A fib (on Eliquis) , MGUS s/p PRBC transfusion 10/21, anxiety/depression, CAD s/p stents (not on plavix), CHF, Diverticulosis, HLD, HTN, thyroid nodules, CKD stage 3, DM2 BIBEMS for worsening sob. Pt was recently d/c for acute hypoxic respiratory failure 2/2 covid-19 pneumonia 1/3-1/18. Pt states he has become increasing short of breath since he was d/c 3 days ago. Pt states he has also had mild headaches w dry cough. pt states he has increased anxiety since d/c 3 days ago. Pt denies fevers, chills, chest pain, palpitations, n/v/d/c.    ED:  Vitals: BP: 144/78; HR 70; T 36.9; RR 17; O2 96% on 2L NC   Labs: Hb 9.1; D-dimer 283; BUN/Cr 33/2.40; ; Procal 0.34; covid-19 positive   ECG: Afib @ 6p bpm   Imaging: cxr (my read); worsening R pulm infiltrates compared to prior.          (21 Jan 2022 12:33)      ---  HOSPITAL COURSE: Patient presented to the ED with shortness of breath and was admitted for acute on chronic CHF. He was diuresed with IV lasix and improved throughout the course of his hospitalization. Cardiology collaborated in the care of the patient to optimize him back to health and recommended transition back to home PO lasix. ECHO was not repeated as it was recently done early January and pt has EF of 55% from that ECHO. Patient had ARIELLA on CKD likely 2/2 fluid overload, which improved w/ diuresis. Patient showed improvement throughout hospitalization. Patient was seen and examined on day of discharge. Patient was medically optimized for discharge with close outpatient follow up.     T(C): 36.3 (01-26-22 @ 04:24), Max: 36.7 (01-25-22 @ 13:05)  HR: 63 (01-26-22 @ 04:24) (63 - 67)  BP: 175/80 (01-26-22 @ 04:24) (150/75 - 175/80)  RR: 18 (01-26-22 @ 04:24) (16 - 18)  SpO2: 94% (01-26-22 @ 04:24) (94% - 98%)  PHYSICAL EXAM:  GENERAL: NAD at rest on RA  HEENT:  anicteric, moist mucous membranes  CHEST/LUNG: CTA b/l  HEART:  rrr, S1, S2, +murmur  ABDOMEN:  BS+, soft, nontender, nondistended  EXTREMITIES: 2+ pitting LE edema b/l, no cyanosis, or calf tenderness  NERVOUS SYSTEM: AOx3 - answers questions and follows commands appropriately    ---  CONSULTANTS:   Pulmonology - Dr. Day   Nephrology - Dr. Rodarte   Cardio- Dr. Monterroso    ---  TIME SPENT:  I, the attending physician, was physically present for the key portions of the evaluation and management (E/M) service provided. The total amount of time spent reviewing the hospital notes, laboratory values, imaging findings, assessing/counseling the patient, discussing with consultant physicians, social work, nursing staff was 65 minutes

## 2022-01-26 NOTE — DISCHARGE NOTE PROVIDER - NSDCMRMEDTOKEN_GEN_ALL_CORE_FT
amLODIPine 10 mg oral tablet: 1 tab(s) orally once a day  apixaban 5 mg oral tablet: 1 tab(s) orally every 12 hours  A[place]ada 1064 mg/3.9 mL intramuscular suspension, extended release: 1 dose(s) intramuscular every 8 weeks  aspirin 81 mg oral delayed release tablet: 1 tab(s) orally once a day  atorvastatin 10 mg oral tablet: 1 tab(s) orally once a day (at bedtime)  budesonide-formoterol 160 mcg-4.5 mcg/inh inhalation aerosol: 2 puff(s) inhaled 2 times a day   carvedilol 6.25 mg oral tablet: 1 tab(s) orally 2 times a day  cloNIDine 0.2 mg oral tablet: 1 tab(s) orally 3 times a day  diclofenac 1% topical gel: Apply topically to affected area 3 times a day  doxazosin 4 mg oral tablet: 1 tab(s) orally 2 times a day  famotidine 20 mg oral tablet: 1 tab(s) orally once a day  folic acid 1 mg oral tablet: 1 tab(s) orally once a day  furosemide 20 mg oral tablet: 3 tab(s) orally 2 times a day  hydrALAZINE 100 mg oral tablet: 1 tab(s) orally 3 times a day  isosorbide mononitrate 30 mg oral tablet, extended release: 3 tab(s) orally once a day  Total 90 mg daily by mouth  lactobacillus acidophilus oral capsule: 1 cap(s) orally 2 times a day  lamoTRIgine 100 mg oral tablet: 1 tab(s) orally once a day  metFORMIN 500 mg oral tablet: 1 tab(s) orally 2 times a day   oxybutynin 15 mg/24 hr oral tablet, extended release: 1 tab(s) orally once a day  Potassium Chloride (Eqv-Klor-Con M20) 20 mEq oral tablet, extended release: 1 tab(s) orally once a day   venlafaxine 150 mg oral capsule, extended release: 1 cap(s) orally once a day  Vitamin B-12 1000 mcg oral tablet: 1 tab(s) orally once a day

## 2022-01-26 NOTE — DISCHARGE NOTE PROVIDER - PROVIDER TOKENS
PROVIDER:[TOKEN:[[id number]]],PROVIDER:[TOKEN:[[id number]]],PROVIDER:[TOKEN:[[id number]]],PROVIDER:[TOKEN:[[id number]]],PROVIDER:[TOKEN:[[id number]]] PROVIDER:[TOKEN:[[id number]],FOLLOWUP:[1 week],ESTABLISHEDPATIENT:[T]],PROVIDER:[TOKEN:[[id number]],FOLLOWUP:[1 week]],PROVIDER:[TOKEN:[[id number]],FOLLOWUP:[1 week]],PROVIDER:[TOKEN:[[id number]],FOLLOWUP:[1 week]],PROVIDER:[TOKEN:[[id number]],FOLLOWUP:[1 week],ESTABLISHEDPATIENT:[T]]

## 2022-01-26 NOTE — DISCHARGE NOTE PROVIDER - NPI NUMBER (FOR SYS[employer] USE ONLY) :
[[id number]],[[id number]] [[id number]],[[id number]]

## 2022-01-26 NOTE — PROGRESS NOTE ADULT - PROVIDER SPECIALTY LIST ADULT
Hospitalist
Nephrology
Cardiology
Cardiology
Nephrology
Pulmonology
Pulmonology
Cardiology
Hospitalist
Nephrology
Pulmonology
Hospitalist
Hospitalist

## 2022-01-26 NOTE — DISCHARGE NOTE NURSING/CASE MANAGEMENT/SOCIAL WORK - PATIENT PORTAL LINK FT
You can access the FollowMyHealth Patient Portal offered by [facility] by registering at the following website: [email or web address]. By joining [facility]’s FollowMyHealth portal, you will also be able to view your health information using other applications (apps) compatible with our system.

## 2022-01-26 NOTE — DISCHARGE NOTE PROVIDER - ATTENDING DISCHARGE PHYSICAL EXAMINATION:
SpO2: 94% (01-26-22 @ 04:24) (94% - 98%)  PHYSICAL EXAM:  GENERAL: NAD at rest on RA  HEENT:  anicteric, moist mucous membranes  CHEST/LUNG: CTA b/l  HEART:  rrr, S1, S2, +murmur  ABDOMEN:  BS+, soft, nontender, nondistended  EXTREMITIES: 2+ pitting LE edema b/l, no cyanosis, or calf tenderness  NERVOUS SYSTEM: AOx3 - answers questions and follows commands appropriately

## 2022-01-26 NOTE — DISCHARGE NOTE PROVIDER - CARE PROVIDER_API CALL
Erich Man)  Medicine  [address] [place] [address]  Phone: [phone]  Fax: [phone]  Follow Up Time:     KRISTAN JACOB  Internal Medicine  [address]  [place] [address]  Phone: [phone]  Fax: [phone]  Follow Up Time:     Jero Vizcaino  GASTROENTEROLOGY  [address] [facility], [address]  [place] [address]  Phone: [phone]  Fax: [phone]  Follow Up Time:     ALEXA IZQUIERDO  Psychiatry & Neurology  [address] [place] [address]  Phone: [phone]  Fax: ()-  Follow Up Time:     Rosmery Acosta  NEUROLOGY  [address] [place] [address]  Phone: [phone]  Fax: [phone]  Follow Up Time:    Erich Man)  Medicine  [address] [place] [address]  Phone: [phone]  Fax: [phone]  Established Patient  Follow Up Time: 1 week    Jero Vizcaino  GASTROENTEROLOGY  [address] [facility], [address]  [place] [address]  Phone: [phone]  Fax: [phone]  Follow Up Time: 1 week    ALEXA IZQUIERDO  Psychiatry & Neurology  [address] [place] [address]  Phone: [phone]  Fax: [phone])-  Follow Up Time: 1 week    Rosmery Acosta  NEUROLOGY  [address] [place] [address]  Phone: [phone]  Fax: [phone]  Follow Up Time: 1 week    Bello Sheridan)  Cardio [facility]  [address] [place] [address]  Phone: [phone]  Fax: [phone]  Established Patient  Follow Up Time: 1 week

## 2022-01-26 NOTE — PROGRESS NOTE ADULT - ASSESSMENT
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 Pt was recently d/c for acute hypoxic respiratory failure 2/2 covid-19 pneumonia 1/3-1/18. Pt states he has become increasing short of breath since he was discharged. Pt states he has also had mild headaches w dry cough with increased anxiety since d/c 3 days ago. Pt denies fevers, chills, chest pain, palpitations, n/v/d/c.    HFpEF/afib/ cad/ htn   ambulated to bathroom with no dyspnea; pt still on RA; continue diuresis with lasix 40 mg po bid for hypervolemia   strict intake and output  TTE 01/2022 EF 55%, mild AS, mild MR/TR - no need to repeat  continue Coreg, Statin, clonidine, norvasc, hydralazine isosorbide; titrate up clonidine for HTN management    Continue Eliquis for thromboembolism for afib   Anemia work up as per primary , given CAD keep hct >26   Monitor and replete lytes, keep K>4 and Mg >2  Further cardiac workup will depend on clinical course  All other workup per primary team      Gail Vanegas, MS ANP, AGAP  Nurse Practitioner- Cardiology  spectra # [phone]/ [phone]

## 2022-02-05 ENCOUNTER — EMERGENCY (EMERGENCY)
Facility: HOSPITAL | Age: 77
LOS: 1 days | Discharge: HOME | End: 2022-02-05
Attending: EMERGENCY MEDICINE | Admitting: EMERGENCY MEDICINE
Payer: MEDICARE

## 2022-02-05 VITALS
TEMPERATURE: 98 F | DIASTOLIC BLOOD PRESSURE: 72 MMHG | RESPIRATION RATE: 16 BRPM | SYSTOLIC BLOOD PRESSURE: 150 MMHG | HEIGHT: 68 IN | OXYGEN SATURATION: 100 % | WEIGHT: 227.96 LBS | HEART RATE: 64 BPM

## 2022-02-05 DIAGNOSIS — Z98.89 OTHER SPECIFIED POSTPROCEDURAL STATES: Chronic | ICD-10-CM

## 2022-02-05 DIAGNOSIS — L05.91 PILONIDAL CYST WITHOUT ABSCESS: Chronic | ICD-10-CM

## 2022-02-05 DIAGNOSIS — Z98.49 CATARACT EXTRACTION STATUS, UNSPECIFIED EYE: Chronic | ICD-10-CM

## 2022-02-05 DIAGNOSIS — N44.00 TORSION OF TESTIS, UNSPECIFIED: Chronic | ICD-10-CM

## 2022-02-05 DIAGNOSIS — I66.9 OCCLUSION AND STENOSIS OF UNSPECIFIED CEREBRAL ARTERY: Chronic | ICD-10-CM

## 2022-02-05 LAB
ALBUMIN SERPL ELPH-MCNC: 3 G/DL — LOW (ref 3.3–5)
ALP SERPL-CCNC: 63 U/L — SIGNIFICANT CHANGE UP (ref 40–120)
ALT FLD-CCNC: 15 U/L — SIGNIFICANT CHANGE UP (ref 12–78)
ANION GAP SERPL CALC-SCNC: 7 MMOL/L — SIGNIFICANT CHANGE UP (ref 5–17)
AST SERPL-CCNC: 14 U/L — LOW (ref 15–37)
BASOPHILS # BLD AUTO: 0.02 K/UL — SIGNIFICANT CHANGE UP (ref 0–0.2)
BASOPHILS NFR BLD AUTO: 0.3 % — SIGNIFICANT CHANGE UP (ref 0–2)
BILIRUB SERPL-MCNC: 0.2 MG/DL — SIGNIFICANT CHANGE UP (ref 0.2–1.2)
BUN SERPL-MCNC: 30 MG/DL — HIGH (ref 7–23)
CALCIUM SERPL-MCNC: 8.6 MG/DL — SIGNIFICANT CHANGE UP (ref 8.5–10.1)
CHLORIDE SERPL-SCNC: 109 MMOL/L — HIGH (ref 96–108)
CK SERPL-CCNC: 35 U/L — SIGNIFICANT CHANGE UP (ref 26–308)
CK SERPL-CCNC: 36 U/L — SIGNIFICANT CHANGE UP (ref 26–308)
CO2 SERPL-SCNC: 24 MMOL/L — SIGNIFICANT CHANGE UP (ref 22–31)
CREAT SERPL-MCNC: 2 MG/DL — HIGH (ref 0.5–1.3)
D DIMER BLD IA.RAPID-MCNC: 466 NG/ML DDU — HIGH
EOSINOPHIL # BLD AUTO: 0.09 K/UL — SIGNIFICANT CHANGE UP (ref 0–0.5)
EOSINOPHIL NFR BLD AUTO: 1.5 % — SIGNIFICANT CHANGE UP (ref 0–6)
GLUCOSE SERPL-MCNC: 152 MG/DL — HIGH (ref 70–99)
HCT VFR BLD CALC: 30 % — LOW (ref 39–50)
HGB BLD-MCNC: 9.4 G/DL — LOW (ref 13–17)
IMM GRANULOCYTES NFR BLD AUTO: 0.5 % — SIGNIFICANT CHANGE UP (ref 0–1.5)
LYMPHOCYTES # BLD AUTO: 0.7 K/UL — LOW (ref 1–3.3)
LYMPHOCYTES # BLD AUTO: 11.9 % — LOW (ref 13–44)
MCHC RBC-ENTMCNC: 27.1 PG — SIGNIFICANT CHANGE UP (ref 27–34)
MCHC RBC-ENTMCNC: 31.3 GM/DL — LOW (ref 32–36)
MCV RBC AUTO: 86.5 FL — SIGNIFICANT CHANGE UP (ref 80–100)
MONOCYTES # BLD AUTO: 0.33 K/UL — SIGNIFICANT CHANGE UP (ref 0–0.9)
MONOCYTES NFR BLD AUTO: 5.6 % — SIGNIFICANT CHANGE UP (ref 2–14)
NEUTROPHILS # BLD AUTO: 4.69 K/UL — SIGNIFICANT CHANGE UP (ref 1.8–7.4)
NEUTROPHILS NFR BLD AUTO: 80.2 % — HIGH (ref 43–77)
NRBC # BLD: 0 /100 WBCS — SIGNIFICANT CHANGE UP (ref 0–0)
PLATELET # BLD AUTO: 260 K/UL — SIGNIFICANT CHANGE UP (ref 150–400)
POTASSIUM SERPL-MCNC: 3.7 MMOL/L — SIGNIFICANT CHANGE UP (ref 3.5–5.3)
POTASSIUM SERPL-SCNC: 3.7 MMOL/L — SIGNIFICANT CHANGE UP (ref 3.5–5.3)
PROT SERPL-MCNC: 6.6 G/DL — SIGNIFICANT CHANGE UP (ref 6–8.3)
RBC # BLD: 3.47 M/UL — LOW (ref 4.2–5.8)
RBC # FLD: 18.3 % — HIGH (ref 10.3–14.5)
SARS-COV-2 RNA SPEC QL NAA+PROBE: SIGNIFICANT CHANGE UP
SODIUM SERPL-SCNC: 140 MMOL/L — SIGNIFICANT CHANGE UP (ref 135–145)
TROPONIN I, HIGH SENSITIVITY RESULT: 57.4 NG/L — SIGNIFICANT CHANGE UP
TROPONIN I, HIGH SENSITIVITY RESULT: 59 NG/L — SIGNIFICANT CHANGE UP
TROPONIN I, HIGH SENSITIVITY RESULT: 60 NG/L — SIGNIFICANT CHANGE UP
WBC # BLD: 5.86 K/UL — SIGNIFICANT CHANGE UP (ref 3.8–10.5)
WBC # FLD AUTO: 5.86 K/UL — SIGNIFICANT CHANGE UP (ref 3.8–10.5)

## 2022-02-05 PROCEDURE — 71045 X-RAY EXAM CHEST 1 VIEW: CPT | Mod: 26

## 2022-02-05 PROCEDURE — 99285 EMERGENCY DEPT VISIT HI MDM: CPT

## 2022-02-05 PROCEDURE — 93010 ELECTROCARDIOGRAM REPORT: CPT

## 2022-02-05 RX ORDER — AMLODIPINE BESYLATE 2.5 MG/1
10 TABLET ORAL ONCE
Refills: 0 | Status: COMPLETED | OUTPATIENT
Start: 2022-02-05 | End: 2022-02-05

## 2022-02-05 RX ORDER — MIRTAZAPINE 45 MG/1
30 TABLET, ORALLY DISINTEGRATING ORAL AT BEDTIME
Refills: 0 | Status: DISCONTINUED | OUTPATIENT
Start: 2022-02-05 | End: 2022-02-08

## 2022-02-05 RX ORDER — FUROSEMIDE 40 MG
40 TABLET ORAL ONCE
Refills: 0 | Status: COMPLETED | OUTPATIENT
Start: 2022-02-05 | End: 2022-02-05

## 2022-02-05 RX ORDER — LOSARTAN POTASSIUM 100 MG/1
50 TABLET, FILM COATED ORAL
Refills: 0 | Status: DISCONTINUED | OUTPATIENT
Start: 2022-02-05 | End: 2022-02-08

## 2022-02-05 RX ORDER — LABETALOL HCL 100 MG
300 TABLET ORAL
Refills: 0 | Status: DISCONTINUED | OUTPATIENT
Start: 2022-02-05 | End: 2022-02-08

## 2022-02-05 RX ORDER — DOXAZOSIN MESYLATE 4 MG
4 TABLET ORAL AT BEDTIME
Refills: 0 | Status: DISCONTINUED | OUTPATIENT
Start: 2022-02-05 | End: 2022-02-08

## 2022-02-05 RX ORDER — FAMOTIDINE 10 MG/ML
20 INJECTION INTRAVENOUS DAILY
Refills: 0 | Status: DISCONTINUED | OUTPATIENT
Start: 2022-02-05 | End: 2022-02-08

## 2022-02-05 RX ORDER — TOPIRAMATE 25 MG
100 TABLET ORAL ONCE
Refills: 0 | Status: COMPLETED | OUTPATIENT
Start: 2022-02-05 | End: 2022-02-05

## 2022-02-05 RX ADMIN — Medication 40 MILLIGRAM(S): at 13:49

## 2022-02-05 RX ADMIN — Medication 300 MILLIGRAM(S): at 17:43

## 2022-02-05 RX ADMIN — FAMOTIDINE 20 MILLIGRAM(S): 10 INJECTION INTRAVENOUS at 19:38

## 2022-02-05 RX ADMIN — Medication 100 MILLIGRAM(S): at 19:39

## 2022-02-05 RX ADMIN — Medication 4 MILLIGRAM(S): at 17:43

## 2022-02-05 RX ADMIN — LOSARTAN POTASSIUM 50 MILLIGRAM(S): 100 TABLET, FILM COATED ORAL at 17:43

## 2022-02-05 RX ADMIN — MIRTAZAPINE 30 MILLIGRAM(S): 45 TABLET, ORALLY DISINTEGRATING ORAL at 19:39

## 2022-02-05 RX ADMIN — AMLODIPINE BESYLATE 10 MILLIGRAM(S): 2.5 TABLET ORAL at 19:38

## 2022-02-05 RX ADMIN — Medication 15 MILLIGRAM(S): at 17:43

## 2022-02-05 NOTE — ED PROVIDER NOTE - OBJECTIVE STATEMENT
pt with recent covid (dx over 1 month ago) bib ems for intermittent chest pain, sharp, episodes lasting <1 min since yest. pt also c/o sob unchanged since d/c from hospital after covid.  pmd - julia dozier

## 2022-02-05 NOTE — ED PROVIDER NOTE - CARE PROVIDER_API CALL
Domingo Posey)  Cardiovascular Disease  [address] [place] [address]  Phone: [phone]  Fax: [phone]  Follow Up Time:     Erich Man)  Medicine  [address] [place] [address]  Phone: [phone]  Fax: [phone]  Follow Up Time:

## 2022-02-05 NOTE — ED PROVIDER NOTE - DATE/TIME 3
OFFICE VISIT      Patient: Oh Amador Date of Service: 2019   : 1956 MRN: [id number]     SUBJECTIVE:   HISTORY OF PRESENT ILLNESS:  Oh Amador is a 63 year old male who presents today for     1.5 WEEKS OF STUFFY/RUNNY NOSE, COUGHING AND EAR PAIN.          PAST MEDICAL HISTORY:  NO DM  Past Medical History:   Diagnosis Date   • Essential (primary) hypertension        MEDICATIONS:  Current Outpatient Medications   Medication Sig   • atorvastatin (LIPITOR) 10 MG tablet Take 1 tablet by mouth daily.   • lisinopril (ZESTRIL) 10 MG tablet Take 10 mg by mouth.   • lisinopril-hydroCHLOROthiazide (PRINZIDE,ZESTORETIC) 20-12.5 MG per tablet TAKE TWO TABLETS BY MOUTH DAILY **NEED APPOINTMENT FOR FURTHER REFILLS**   • selenium sulfide 2.25 % shampoo SHAMPOO HAIR/SCALP AS DIRECTED   • triamcinolone (ARISTOCORT) 0.1 % cream APPLY  AND RUB  IN A THIN FILM TO AFFECTED AREAS TWICE DAILY.(AM AND PM).   • amoxicillin (AMOXIL) 500 MG capsule Take 1 capsule by mouth 3 times daily.   • Cholecalciferol (D3 DOTS) 2000 units TABLET DISPERSIBLE Take 2,000 Units by mouth daily.   • zoster vaccine recomb adjuvanted (SHINGRIX) 50 MCG/0.5ML injection Inject 0.5 mLs into the muscle 1 time. Repeat dose in 2 to 6 months (unless 1 dose already given), for a total of 2 doses.     No current facility-administered medications for this visit.        ALLERGIES:  ALLERGIES:   Allergen Reactions   • Simvastatin MYALGIA       PAST SURGICAL HISTORY:  Past Surgical History:   Procedure Laterality Date   • Cholecystectomy         FAMILY HISTORY:  PARENTS--NO DM  Family History   Problem Relation Age of Onset   • Stomach Cancer Mother    • Hypertension Father    • Stomach Cancer Brother    • Cancer, Colon Brother    • Cancer, Ovarian Maternal Grandmother        SOCIAL HISTORY:  Social History     Tobacco Use   • Smoking status: Former Smoker   • Smokeless tobacco: Never Used   • Tobacco comment: quit 30 y ago   Substance Use Topics   •  Alcohol use: Never     Frequency: Never   • Drug use: Never       Review of Systems   Constitutional: Positive for activity change.   HENT: Positive for congestion and rhinorrhea.    Eyes: Negative.    Respiratory: Positive for cough.    Cardiovascular: Negative.    Gastrointestinal: Negative.    Endocrine: Negative.    Genitourinary: Negative.    Musculoskeletal: Negative.    Skin: Negative.    Allergic/Immunologic: Negative.    Neurological: Negative.    Hematological: Negative.    Psychiatric/Behavioral: Negative.    All other systems reviewed and are negative.        OBJECTIVE:     Physical Exam   Constitutional: He is oriented to person, place, and time and well-developed, well-nourished, and in no distress. No distress.   HENT:   Head: Normocephalic and atraumatic.   Right Ear: External ear normal.   Left Ear: External ear normal.   Mouth/Throat: Oropharynx is clear and moist. No oropharyngeal exudate.   RIGHT TM'S ERYTHEMATOUS  NASAL TURBINATES--EDEMATOUS AND ERYTHEMATOUS    Eyes: Pupils are equal, round, and reactive to light. Conjunctivae and EOM are normal. Right eye exhibits no discharge. Left eye exhibits no discharge. No scleral icterus.   Neck: Normal range of motion. Neck supple. No thyromegaly present.   Cardiovascular: Normal rate, regular rhythm, normal heart sounds and intact distal pulses.   No murmur heard.  Pulmonary/Chest: Effort normal and breath sounds normal. No respiratory distress. He has no wheezes. He has no rales. He exhibits no tenderness.   Abdominal: Soft. Bowel sounds are normal. He exhibits no distension. There is no tenderness. There is no rebound and no guarding. Musculoskeletal: Normal range of motion.         General: No tenderness or edema.     Lymphadenopathy:     He has no cervical adenopathy.   Neurological: He is alert and oriented to person, place, and time. No cranial nerve deficit. He exhibits normal muscle tone. Gait normal. Coordination normal.   Skin: Skin is warm.  No rash noted. He is not diaphoretic. No erythema. No pallor.   Psychiatric: Mood and affect normal.   Nursing note and vitals reviewed.      Visit Vitals  /86   Pulse 71   Temp 98 °F (36.7 °C) (Oral)   Resp 18   Wt 96.6 kg (212 lb 13.7 oz)   SpO2 98%   BMI 32.36 kg/m²       DIAGNOSTIC STUDIES:   LAB RESULTS:    No results found for this visit on 12/13/19.    Assessment AND PLAN:   This is a 63 year old year-old male who presents with .    1. Right otitis media, unspecified otitis media type    2. Rhinosinusitis      Please take medications as directed.  Supportive care and monitoring, and Follow up with your PCp or Go to ER if it is needed as discussed.        Instructions provided as documented in the AVS.          The patient indicated understanding of the diagnosis and agreed with the plan of care.       06-Feb-2022 09:31

## 2022-02-05 NOTE — ED PROVIDER NOTE - PROGRESS NOTE DETAILS
Pt seen by Dr Posey, check CE now, pending VQ. Should increase Lasix to 60am, 40 pm. Due to avail of nuc med, pt will hold until am to obtain VQ. Will check 3rd ce , 8 hrs after. Due to avail of nuc med, pt will hold until am to obtain VQ. Will check 3rd ce , 8 hrs after. Pt otherwise feeling well, no acute co or changes. Received sign out from Dr Kent - pt pending VQ, however had some anxiety - wouldn't stay still initially. Pt states felt anxious due to closeness of machine - will give some ativan and will retry scan.   Pt seen by Dr MYRNA Rosario today- agree with plan, reeval for dc after vq Pt doing well, no acute co or changes. PT states is feeling well. Kb Rosario, ok to dc home, for outpt fu.  kb pt at length - wishes to go home, will fu with PMD and cardio as outpt asap.  At length discussion with patient regarding nature of the patient's symptoms. Discussed results of all diagnostic testing in ED. Discussed chest pain, Shortness of breath, Dizziness, syncope /  near syncope precautions and instructions. Patient demonstrates understanding that a cardiac cause of the symptoms has not been totally excluded, but at this time there is no evidence to warrant an inpatient workup.  Discussed the importance of continued follow-up with Cardiology as outpatient to complete workup.

## 2022-02-05 NOTE — ED PROVIDER NOTE - NSFOLLOWUPINSTRUCTIONS_ED_ALL_ED_FT
1)  Follow-up with your Primary Medical Doctor. Call next business day for prompt follow-up.  2) Follow-up with Cardiology as discussed. Call today / next business day for prompt follow-up and further workup and evaluation.  3) Return to Emergency room for any worsening or persistent pain, shortness of breath, weakness, fever, abdominal pain, dizziness, passing out, unexplained pain in arms, legs, back, any vomiting, feeling like your heart is racing,  or any other concerning symptoms.  4) See attached instruction sheets for additional information, including information regarding signs and symptoms to look out for, reasons to seek immediate care and other important instructions.   5) Increase Lasix to 60mg in the morning, 40 mg at night

## 2022-02-05 NOTE — CONSULT NOTE ADULT - ATTENDING COMMENTS
atyp cp, unlikely ischemia  repeat ce  for v/q  lasix 40 iv x 1  if workup neg can dc home and change lasix at home from 40 bid to 60/40

## 2022-02-05 NOTE — ED PROVIDER NOTE - COVID-19 ORDERING FACILITY
4 yo male old  pt with no significant PMHx brought by parents to ED for vomiting x 3 days ago, diarrhea x 2 days and fever today. 3 days ago pt had vomiting, then diarrhea lasting 2 days. Had 2 loose stools today, nonbloody and nonmucinous. Today started with fever, tmax 102F. No cough, nor rash. Tolerated 16 oz of fluid. No social concerns. No  other complaints. Vaccines UTD. NKDA. [facility]

## 2022-02-05 NOTE — CONSULT NOTE ADULT - SUBJECTIVE AND OBJECTIVE BOX
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   reports intermittent episodes of MSCP non radiating lasting minutes. Denies associated symptoms of diaphoresis, nausea, palpitations, syncope.  Reports chronic LE edema, denies dietary indiscretion or medication non compliance.   denies fever chills n/v/d. currently in ed with no complaints of chest pain resting in NAD on Room air laying flat with no orthopnea.     PAST MEDICAL & SURGICAL HISTORY:  HTN (hypertension)  c/b multiple episodes of hypertensive urgency    HLD (hyperlipidemia)    Atrial fibrillation  first documented on EKG 10/7/2021    CAD (coronary artery disease)  s/p stents (not on plavix)    Type 2 diabetes mellitus  not on home insulin/ Meds    Anxiety  Depression  multiple psych medications    History of diverticulitis  07/2021    Diverticulosis  c/b GIB in 2020    Afib  on AC    Stage 3 chronic kidney disease    Anemia of chronic disease  Monoclonal Gammopathy-MGUS  pRBC transfusion 10/15/21    Chronic diastolic congestive heart failure    Multiple thyroid nodules    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  Had surgery ( April 2013 )    S/P tonsillectomy    S/P arthroscopic knee surgery  Bilateral ( 2005 )    Torsion of testicle  Had surgery at age 14    Pilonidal cyst  Had surgery ( 1969 )    S/P cataract surgery  Bilateral    H/O hernia repair        SOCIAL HISTORY:  No tobacco, ethanol, or drug abuse.    FAMILY HISTORY:  FHx: throat cancer    No family history of colorectal cancer      No family history of acute MI or sudden cardiac death.    MEDICATIONS  (STANDING):    MEDICATIONS  (PRN):      Allergies    No Known Allergies    Intolerances        REVIEW OF SYSTEMS:    CONSTITUTIONAL: No weakness, fevers or chills  EYES/ENT: No visual changes;  No vertigo or throat pain   NECK: No pain or stiffness  RESPIRATORY: No cough, wheezing, hemoptysis; No shortness of breath  CARDIOVASCULAR: +chest pain or palpitations  GASTROINTESTINAL: No abdominal pain. No nausea, vomiting, or hematemesis; No diarrhea or constipation. No melena or hematochezia.  GENITOURINARY: No dysuria, frequency or hematuria  NEUROLOGICAL: No numbness or weakness  SKIN: No itching or rash  All other review of systems is negative unless indicated above    VITAL SIGNS:   Vital Signs Last 24 Hrs  T(C): 36.8 (05 Feb 2022 10:21), Max: 36.8 (05 Feb 2022 10:21)  T(F): 98.3 (05 Feb 2022 10:21), Max: 98.3 (05 Feb 2022 10:21)  HR: 64 (05 Feb 2022 10:21) (64 - 64)  BP: 150/72 (05 Feb 2022 10:21) (150/72 - 150/72)  BP(mean): --  RR: 16 (05 Feb 2022 10:21) (16 - 16)  SpO2: 100% (05 Feb 2022 10:21) (100% - 100%)    I&O's Summary      On Exam:    Constitutional: NAD, alert and oriented x 3, obese   Lungs:  Non-labored, breath sounds are clear bilaterally, No wheezing, rales or rhonchi  Cardiovascular: RRR.  S1 and S2 positive.  No murmurs, rubs, gallops or clicks  Gastrointestinal: Bowel Sounds present, soft, nontender.   Lymph: No peripheral edema. No cervical lymphadenopathy.  Neurological: Alert, no focal deficits  Skin: No rashes or ulcers   Psych:  Mood & affect appropriate.    LABS: All Labs Reviewed:                        9.4    5.86  )-----------( 260      ( 05 Feb 2022 10:51 )             30.0     05 Feb 2022 10:51    140    |  109    |  30     ----------------------------<  152    3.7     |  24     |  2.00     Ca    8.6        05 Feb 2022 10:51    TPro  6.6    /  Alb  3.0    /  TBili  0.2    /  DBili  x      /  AST  14     /  ALT  15     /  AlkPhos  63     05 Feb 2022 10:51          Blood Culture:         RADIOLOGY:    EKG:  < from: 12 Lead ECG (01.21.22 @ 09:46) >    Ventricular Rate 69 BPM    Atrial Rate 277 BPM    QRS Duration 106 ms    Q-T Interval 412 ms    QTC Calculation(Bazett) 441 ms    R Axis -7 degrees    T Axis -2 degrees    Diagnosis Line Atrial fibrillation  Moderate voltage criteria for LVH, may be normal variant  Nonspecific ST and T wave abnormality  Abnormal ECG  When compared with ECG of 03-JAN-2022 10:49,  ST now depressed in Lateral leads  < from: TTE Echo Complete w/o Contrast w/ Doppler (01.06.22 @ 10:52) >      IMPRESSION:  Technically difficult study  Moderate left ventricular hypertrophy with normal systolic function,   estimated LVEF of 55%.  Grossly normal RV size and systolic function.  Calcified trileaflet aortic valve with mild aortic stenosis  Mild MR andTR.  No significant pericardial effusion.    < end of copied text >

## 2022-02-05 NOTE — ED PROVIDER NOTE - PATIENT PORTAL LINK FT
You can access the FollowMyHealth Patient Portal offered by [facility] by registering at the following website: [email or web address]. By joining [employer]’s FollowMyHealth portal, you will also be able to view your health information using other applications (apps) compatible with our system.

## 2022-02-05 NOTE — ED ADULT NURSE NOTE - NS ED NURSE LEVEL OF CONSCIOUSNESS ORIENTATION
Process Group Note    PATIENT'S NAME: Joel Pineda  MRN:   [id number]  :   1972  ACCT. NUMBER: [id number]  DATE OF SERVICE: 10/07/21  START TIME:  2:00 PM  END TIME:  2:50 PM  FACILITATOR: Magali Dodson LICSW; Amy Ferro LGSW  TOPIC:  Process Group    Diagnoses:  296.33 (F33.2) Major Depressive Disorder, Recurrent Episode, Severe _.  4. Other Diagnoses that is relevant to services:   300.00 (F41.9) Unspecified Anxiety Disorder  301.83 (F60.3) Borderline Personality Disorder.         [facility]  TRACK: 4B    NUMBER OF PARTICIPANTS: 7                                      Service Modality:  Video Visit     Telemedicine Visit: The patient's condition can be safely assessed and treated via synchronous audio and visual telemedicine encounter.      Reason for Telemedicine Visit: Services only offered telehealth    Originating Site (Patient Location): Patient's home    Distant Site (Provider Location): Provider Remote Setting- Home Office    Consent:  The patient/guardian has verbally consented to: the potential risks and benefits of telemedicine (video visit) versus in person care; bill my insurance or make self-payment for services provided; and responsibility for payment of non-covered services.     Patient would like the video invitation sent by:  My Chart    Mode of Communication:  Video Conference via Medical Zoom    As the provider I attest to compliance with applicable laws and regulations related to telemedicine.                Data:    Session content: At the start of this group, patients were invited to check in by identifying themselves, describing their current emotional status, and identifying issues to address in this group.   Area(s) of treatment focus addressed in this session included Symptom Management, Personal Safety and Community Resources/Discharge Planning.  Client began his check in by preemptively apologizing if he was fragmented in his  speech.  Client reported feeling grateful for a conversation he recently had with his younger sister about her upcoming visit to [place] from [place].  Client stated that his sister was driving up from [place] and that his stepdad invited some of their cousins to join in the visit.  Client expressed feelings of being overwhelmed with not knowing the details of the plan and how he will get to his stepdad s house if needed.  Client reported feeling anxious as to if he will get  face time together  with his sister now that cousins are invited.    Co-facilitator validated client s desire to having one on one time with his sister.  Group members offered recommendations to encourage his sister visit his place as a way to get alone time with his sister. Client did not report any suicidal ideation, plan or intent.      Therapeutic Interventions/Treatment Strategies:  Psychotherapist offered support, feedback and validation and reinforced use of skills. Treatment modalities used include Cognitive Behavioral Therapy. Interventions include Relationship Skills: Assisted patients in implementing more effective communication skills in their relationships and Encouraged development and maintenance  of healthy boundaries.    Assessment:    Patient response:   Patient responded to session by accepting feedback and listening    Possible barriers to participation / learning include: and no barriers identified    Health Issues:   Yes: Proprioception, Associated Psychological Distress       Substance Use Review:   Substance Use: No active concerns identified.    Mental Status/Behavioral Observations  Appearance:   Appropriate   Eye Contact:   Good   Psychomotor Behavior: Normal   Attitude:   Cooperative  Friendly Pleasant  Orientation:   All  Speech   Rate / Production: Normal    Volume:  Normal   Mood:    Anxious   Affect:    Appropriate   Thought Content:   Clear  Thought Form:  Coherent  Logical     Insight:    Good     Plan:     Safety  Plan: No current safety concerns identified.  Recommended that patient call 911 or go to the local ED should there be a change in any of these risk factors.     Barriers to treatment: None identified    Patient Contracts (see media tab):  None    Substance Use: Not addressed in session     Continue or Discharge: Patient will continue in Adult Day Treatment (ADT)  as planned. Patient is likely to benefit from learning and using skills as they work toward the goals identified in their treatment plan.      ABHIJEET Salas  October 7, 2021     Oriented - self; Oriented - place; Oriented - time

## 2022-02-05 NOTE — CONSULT NOTE ADULT - ASSESSMENT
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   reports intermittent episodes of MSCP non radiating lasting minutes. Denies associated symptoms of diaphoresis, nausea, palpitations, syncope.  Reports chronic LE edema, denies dietary indiscretion or medication non compliance.   denies fever chills n/v/d. currently in ed with no complaints of chest pain resting in NAD on Room air laying flat with no orthopnea.     Chest pain   check 12 lead ekg  troponin negative x1, can check 2nd set   d dimer elevated 466, was elevated on prior admission  awaiting vq scan   Chronic LE edema, on lasix 40mg PO bid at home, can give additional 20mg IV today  TTE 01/6/ 2022 EF 55%, moderate LVH, mild MR, mild AS, mild TR   continue home medications: Coreg, Statin, clonidine, norvasc, hydralazine isosorbide   Continue Eliquis for thromboembolism for afib   anemia likely of ckd, hgb at baseline , as per primary team     further plan pending clinical course and results of above  Monitor and replete electrolytes. Keep K>4.0 and Mg>2.0.  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   reports intermittent episodes of MSCP non radiating lasting minutes. Denies associated symptoms of diaphoresis, nausea, palpitations, syncope.  Reports chronic LE edema, denies dietary indiscretion or medication non compliance.   denies fever chills n/v/d. currently in ed with no complaints of chest pain resting in NAD on Room air laying flat with no orthopnea.     Chest pain   check 12 lead ekg  troponin negative x1, can check 2nd set   d dimer elevated 466, was elevated on prior admission  awaiting vq scan as per ED,  Chronic LE edema, on lasix 40mg PO bid at home, can give additional 20mg IV today  TTE 01/6/ 2022 EF 55%, moderate LVH, mild MR, mild AS, mild TR   continue home medications: Coreg, Statin, clonidine, norvasc, hydralazine isosorbide   Continue Eliquis for thromboembolism for afib   anemia likely of ckd, hgb at baseline , as per primary team     further plan pending clinical course and results of above  Monitor and replete electrolytes. Keep K>4.0 and Mg>2.0.  Carmelina Leger FNP-C  Cardiology NP  [facility] [phone]

## 2022-02-05 NOTE — ED PROVIDER NOTE - CARE PROVIDERS DIRECT ADDRESSES
[email or web address]

## 2022-02-06 VITALS
OXYGEN SATURATION: 96 % | RESPIRATION RATE: 16 BRPM | SYSTOLIC BLOOD PRESSURE: 184 MMHG | HEART RATE: 77 BPM | DIASTOLIC BLOOD PRESSURE: 81 MMHG

## 2022-02-06 PROCEDURE — 96376 TX/PRO/DX INJ SAME DRUG ADON: CPT | Mod: XU

## 2022-02-06 PROCEDURE — 78582 LUNG VENTILAT&PERFUS IMAGING: CPT | Mod: 26,ME

## 2022-02-06 PROCEDURE — G1004: CPT

## 2022-02-06 PROCEDURE — 96375 TX/PRO/DX INJ NEW DRUG ADDON: CPT | Mod: XU

## 2022-02-06 PROCEDURE — 82550 ASSAY OF CK (CPK): CPT

## 2022-02-06 PROCEDURE — 96374 THER/PROPH/DIAG INJ IV PUSH: CPT | Mod: XU

## 2022-02-06 PROCEDURE — 80053 COMPREHEN METABOLIC PANEL: CPT

## 2022-02-06 PROCEDURE — A9540: CPT

## 2022-02-06 PROCEDURE — 82962 GLUCOSE BLOOD TEST: CPT

## 2022-02-06 PROCEDURE — 78582 LUNG VENTILAT&PERFUS IMAGING: CPT | Mod: ME

## 2022-02-06 PROCEDURE — 85379 FIBRIN DEGRADATION QUANT: CPT

## 2022-02-06 PROCEDURE — 93005 ELECTROCARDIOGRAM TRACING: CPT

## 2022-02-06 PROCEDURE — 85025 COMPLETE CBC W/AUTO DIFF WBC: CPT

## 2022-02-06 PROCEDURE — 99285 EMERGENCY DEPT VISIT HI MDM: CPT | Mod: 25

## 2022-02-06 PROCEDURE — 87635 SARS-COV-2 COVID-19 AMP PRB: CPT

## 2022-02-06 PROCEDURE — 84484 ASSAY OF TROPONIN QUANT: CPT

## 2022-02-06 PROCEDURE — 71045 X-RAY EXAM CHEST 1 VIEW: CPT

## 2022-02-06 PROCEDURE — A9567: CPT

## 2022-02-06 PROCEDURE — 36415 COLL VENOUS BLD VENIPUNCTURE: CPT

## 2022-02-06 RX ORDER — FUROSEMIDE 40 MG
60 TABLET ORAL ONCE
Refills: 0 | Status: COMPLETED | OUTPATIENT
Start: 2022-02-06 | End: 2022-02-06

## 2022-02-06 RX ORDER — LABETALOL HCL 100 MG
10 TABLET ORAL ONCE
Refills: 0 | Status: COMPLETED | OUTPATIENT
Start: 2022-02-06 | End: 2022-02-06

## 2022-02-06 RX ADMIN — LOSARTAN POTASSIUM 50 MILLIGRAM(S): 100 TABLET, FILM COATED ORAL at 05:23

## 2022-02-06 RX ADMIN — Medication 60 MILLIGRAM(S): at 09:26

## 2022-02-06 RX ADMIN — Medication 10 MILLIGRAM(S): at 16:02

## 2022-02-06 RX ADMIN — Medication 15 MILLIGRAM(S): at 05:22

## 2022-02-06 RX ADMIN — Medication 300 MILLIGRAM(S): at 05:23

## 2022-02-06 RX ADMIN — Medication 0.5 MILLIGRAM(S): at 09:42

## 2022-02-06 NOTE — PROGRESS NOTE ADULT - ASSESSMENT
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   reports intermittent episodes of MSCP non radiating lasting minutes. Denies associated symptoms of diaphoresis, nausea, palpitations, syncope.    Atypical chest pain / afib/ cad/ chf  pain now resolved   ekg revealing NSR incomplete RBBB  tele SB 58bpm during exam   troponin negative x3   d dimer elevated 466, was elevated on prior admission  awaiting vq scan   Chronic LE edema reports more than his baseline edema- on lasix 40mg PO bid at home,sp lasix 20mg IV in ed 1/5 in addition to his PO , can give 40mg IV today and reassess in am    attempt to wean off NC  strict intake and output   TTE 01/6/ 2022 EF 55%, moderate LVH, mild MR, mild AS, mild TR - no need to repeat   resume home Eliquis for thromboembolism for afib history   anemia likely of ckd, hgb at baseline , as per primary team   resume home medications norvasc, asa, lipitor, coreg, clonidine, doxazosin,  hydralazine, isosorbide  DC losartan given ARIELLA on CKD , DC labetalol and resume home meds as outlined above     Labs pending at this time  further plan pending clinical course and results of above  Monitor and replete electrolytes. Keep K>4.0 and Mg>2.0.  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   reports intermittent episodes of MSCP non radiating lasting minutes. Denies associated symptoms of diaphoresis, nausea, palpitations, syncope.    Atypical chest pain / afib/ cad/ chf  pain now resolved   ekg revealing NSR incomplete RBBB  tele SB 58bpm during exam   troponin negative x3   d dimer elevated 466, was elevated on prior admission  awaiting vq scan   Chronic LE edema reports more than his baseline edema- on lasix 40mg PO bid at home,sp lasix 20mg IV in ed 1/5 in addition to his PO ,   attempt to wean off NC  strict intake and output   TTE 01/6/ 2022 EF 55%, moderate LVH, mild MR, mild AS, mild TR - no need to repeat   resume home Eliquis for thromboembolism for afib history   anemia likely of ckd, hgb at baseline , as per primary team   resume home medications norvasc, asa, lipitor, coreg, clonidine, doxazosin,  hydralazine, isosorbide  DC losartan given ARIELLA on CKD , DC labetalol and resume home meds as outlined above     Labs pending at this time  further plan pending clinical course and results of above  Monitor and replete electrolytes. Keep K>4.0 and Mg>2.0.  Carmelina Leger FNP-C  Cardiology NP  [facility] [phone]

## 2022-02-06 NOTE — ED ADULT NURSE REASSESSMENT NOTE - COMFORT CARE
meal provided/warm blanket provided
plan of care explained/warm blanket provided
assisted with urinal/plan of care explained/warm blanket provided

## 2022-02-06 NOTE — PROGRESS NOTE ADULT - SUBJECTIVE AND OBJECTIVE BOX
[facility] -- Fifi Bliss, Kalpesh Valdovinos, Abraham Sheridan Savella  Office # [phone]      Follow Up:  chest pain    Subjective/Observations:   Seen at bedside, remains on 3 liters NC, denies shortness of breath chest pain dizziness palpitations  reports LE edema more than his baseline     REVIEW OF SYSTEMS: All other review of systems is negative unless indicated above    PAST MEDICAL & SURGICAL HISTORY:  HTN (hypertension)  c/b multiple episodes of hypertensive urgency    HLD (hyperlipidemia)    Atrial fibrillation  first documented on EKG 10/7/2021    CAD (coronary artery disease)  s/p stents (not on plavix)    Type 2 diabetes mellitus  not on home insulin/ Meds    Anxiety  Depression  multiple psych medications    History of diverticulitis  07/2021    Diverticulosis  c/b GIB in 2020    Afib  on AC    Stage 3 chronic kidney disease    Anemia of chronic disease  Monoclonal Gammopathy-MGUS  pRBC transfusion 10/15/21    Chronic diastolic congestive heart failure    Multiple thyroid nodules    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  Had surgery ( April 2013 )    S/P tonsillectomy    S/P arthroscopic knee surgery  Bilateral ( 2005 )    Torsion of testicle  Had surgery at age 14    Pilonidal cyst  Had surgery ( 1969 )    S/P cataract surgery  Bilateral    H/O hernia repair        MEDICATIONS  (STANDING):  busPIRone 15 milliGRAM(s) Oral two times a day  doxazosin 4 milliGRAM(s) Oral at bedtime  famotidine    Tablet 20 milliGRAM(s) Oral daily  labetalol 300 milliGRAM(s) Oral two times a day  losartan 50 milliGRAM(s) Oral two times a day  mirtazapine 30 milliGRAM(s) Oral at bedtime    MEDICATIONS  (PRN):      Allergies    No Known Allergies    Intolerances        Vital Signs Last 24 Hrs  T(C): 36.2 (05 Feb 2022 19:36), Max: 37 (05 Feb 2022 15:32)  T(F): 97.2 (05 Feb 2022 19:36), Max: 98.6 (05 Feb 2022 15:32)  HR: 63 (06 Feb 2022 04:48) (63 - 65)  BP: 181/85 (06 Feb 2022 04:48) (150/72 - 190/92)  BP(mean): --  RR: 15 (06 Feb 2022 04:48) (15 - 18)  SpO2: 94% (06 Feb 2022 04:48) (94% - 100%)    I&O's Summary    Weight (kg): 103.4 (02-05 @ 10:21)    PHYSICAL EXAM:  TELE: SB 54   Constitutional: NAD, awake and alert, obese  HEENT: Moist Mucous Membranes, Anicteric  Pulmonary: Non-labored, breath sounds are clear bilaterally, No wheezing, crackles or rhonchi  Cardiovascular: Regular, S1 and S2 nl, No murmurs, rubs, gallops or clicks  Gastrointestinal: Bowel Sounds present, soft, nontender.   Lymph: +bilateral peripheral edema.  Skin: No visible rashes or ulcers.  Psych:  Mood & affect appropriate    LABS: All Labs Reviewed:                        9.4    5.86  )-----------( 260      ( 05 Feb 2022 10:51 )             30.0     05 Feb 2022 10:51    140    |  109    |  30     ----------------------------<  152    3.7     |  24     |  2.00     Ca    8.6        05 Feb 2022 10:51    TPro  6.6    /  Alb  3.0    /  TBili  0.2    /  DBili  x      /  AST  14     /  ALT  15     /  AlkPhos  63     05 Feb 2022 10:51      CARDIAC MARKERS ( 05 Feb 2022 23:32 )  x     / x     / 36 U/L / x     / x      CARDIAC MARKERS ( 05 Feb 2022 13:54 )  x     / x     / 35 U/L / x     / x           EKG:  < from: 12 Lead ECG (01.21.22 @ 09:46) >    Ventricular Rate 69 BPM    Atrial Rate 277 BPM    QRS Duration 106 ms    Q-T Interval 412 ms    QTC Calculation(Bazett) 441 ms    R Axis -7 degrees    T Axis -2 degrees    Diagnosis Line Atrial fibrillation  Moderate voltage criteria for LVH, may be normal variant  Nonspecific ST and T wave abnormality  Abnormal ECG  When compared with ECG of 03-JAN-2022 10:49,  ST now depressed in Lateral leads  < from: TTE Echo Complete w/o Contrast w/ Doppler (01.06.22 @ 10:52) >      IMPRESSION:  Technically difficult study  Moderate left ventricular hypertrophy with normal systolic function,   estimated LVEF of 55%.  Grossly normal RV size and systolic function.  Calcified trileaflet aortic valve with mild aortic stenosis  Mild MR andTR.  No significant pericardial effusion.    < from: 12 Lead ECG (02.05.22 @ 10:34) >  Ventricular Rate 64 BPM    Atrial Rate 64 BPM    P-R Interval 320 ms    QRS Duration 112 ms    Q-T Interval 444 ms    QTC Calculation(Bazett) 458 ms    P Axis 16 degrees    R Axis 2 degrees    T Axis -18 degrees    Diagnosis Line Sinus rhythm with 1st degree AV block  Incomplete right bundle branch block  Nonspecific ST and T wave abnormality  Abnormal ECG  When compared with ECG of 21-JAN-2022 09:46,  Sinus rhythm has replaced Atrial fibrillation    < end of copied text >

## 2022-02-07 ENCOUNTER — NON-APPOINTMENT (OUTPATIENT)
Age: 77
End: 2022-02-07

## 2022-02-07 ENCOUNTER — APPOINTMENT (OUTPATIENT)
Dept: CARDIOLOGY | Facility: CLINIC | Age: 77
End: 2022-02-07
Payer: MEDICAID

## 2022-02-07 VITALS
BODY MASS INDEX: 34.56 KG/M2 | HEIGHT: 68 IN | DIASTOLIC BLOOD PRESSURE: 81 MMHG | SYSTOLIC BLOOD PRESSURE: 137 MMHG | OXYGEN SATURATION: 96 % | WEIGHT: 228 LBS | HEART RATE: 84 BPM

## 2022-02-07 DIAGNOSIS — E78.5 HYPERLIPIDEMIA, UNSPECIFIED: ICD-10-CM

## 2022-02-07 DIAGNOSIS — I10 ESSENTIAL (PRIMARY) HYPERTENSION: ICD-10-CM

## 2022-02-07 DIAGNOSIS — R06.00 DYSPNEA, UNSPECIFIED: ICD-10-CM

## 2022-02-07 DIAGNOSIS — I50.9 HEART FAILURE, UNSPECIFIED: ICD-10-CM

## 2022-02-07 PROCEDURE — 99214 OFFICE O/P EST MOD 30 MIN: CPT

## 2022-02-07 PROCEDURE — 93000 ELECTROCARDIOGRAM COMPLETE: CPT

## 2022-02-07 PROCEDURE — 99072 ADDL SUPL MATRL&STAF TM PHE: CPT

## 2022-02-07 RX ORDER — ATORVASTATIN CALCIUM 10 MG/1
10 TABLET, FILM COATED ORAL
Refills: 0 | Status: ACTIVE | COMMUNITY

## 2022-02-15 NOTE — DISCHARGE NOTE PROVIDER - NSDCHC_MEDRECSTATUS_GEN_ALL_CORE
[facility] Hospitalist Progress Note      Subjective  Pt seen and examined   Last night episode of psuedo seizure noted  In bed  Alert oriented time 2-3  Pleasant   She has a hyper activity kid at home ,who keep her busy ,also she in court with her ex  for the custody of her kids  Also she acknowledge of using walker at home on and off because of fall she had in high school  She said she stopped taking her meds for seizure by her own choice days a go        Physical Exam    Vitals with min/max:    Vital Last Value 24 Hour Range   Temperature 98.4 °F (36.9 °C) (02/15/22 1141) Temp  Min: 97.9 °F (36.6 °C)  Max: 98.8 °F (37.1 °C)   Pulse 78 (02/15/22 1141) Pulse  Min: 66  Max: 82   Respiratory 16 (02/15/22 1141) Resp  Min: 16  Max: 18   Non-Invasive  Blood Pressure 131/76 (02/15/22 1141) BP  Min: 94/61  Max: 131/76   Pulse Oximetry 95 % (02/15/22 1141) SpO2  Min: 95 %  Max: 98 %   Arterial   Blood Pressure   No data recorded      Body mass index is 26.4 kg/m².      I/O's:    Intake/Output Summary (Last 24 hours) at 2/15/2022 1233  Last data filed at 2/15/2022 0000  Gross per 24 hour   Intake 266.67 ml   Output 0 ml   Net 266.67 ml       General: patient not in acute distress.  HEENT: Normocephalic. Normal conjunctiva.   Respiratory: chest expansion wnl. Lung clear to auscultation bilaterally.  Cardiovascular: Regular rate and rhythm.  No peripheral edema.  Gastrointestinal: Abdomen soft, non tender, non distended. Bowel sound present in all 4 quadrants.  Genitourinary: No suprapubic tenderness. No costovertebra angle tenderness.  Musculoskeletal: Moves all 4 extremities  Neurology: alert and oriented 2-3  No focal defect  Skin: Warm. No concerning rash.  Psychiatry: Cooperative      Current Medications:  • pantoprazole  40 mg Oral Daily   • divalproex  500 mg Oral QHS   • divalproex  250 mg Oral Daily   • escitalopram  10 mg Oral Daily   • levETIRAcetam  1,000 mg Oral Nightly   • levETIRAcetam  750 mg Oral Daily    • sertraline  25 mg Oral Daily   • sodium chloride (PF)  2 mL Intracatheter 2 times per day   • enoxaparin  40 mg Subcutaneous Daily   • Potassium Standard Replacement Protocol   Does not apply See Admin Instructions   • Magnesium Standard Replacement Protocol   Does not apply See Admin Instructions   • amitriptyline  10 mg Oral QHS      • lactated ringers infusion 100 mL/hr at 02/15/22 0009      LORazepam, albuterol, cyclobenzaprine, sodium chloride, sodium chloride, polyethylene glycol, ondansetron, ketorolac (TORADOL) injection       Labs     Recent Labs     02/13/22  1444 02/14/22  0410 02/15/22  0637   WBC 5.5 4.9 4.8   RBC 4.12 3.43* 3.49*   HGB 12.9 10.8* 11.1*   HCT 38.8 32.0* 32.5*    216 201   MCV 94.2 93.3 93.1   MCH 31.3 31.5 31.8   MCHC 33.2 33.8 34.2   NRBCRE 0 0 0         Recent Labs     02/13/22  1444 02/14/22  0410 02/14/22  1805   SODIUM 139 137  --    POTASSIUM 3.6 3.5 4.5   CO2 28 27  --    ANIONGAP 9* 7*  --    GLUCOSE 90 90  --    BUN 16 13  --    CREATININE 0.57 0.48*  --    BCRAT 28* 27*  --    CALCIUM 8.9 8.4  --    BILIRUBIN 0.3 0.3  --    AST 8 8  --    GPT 13 9  --    ALKPT 86 63  --    GLOB 4.3* 3.2  --    AGR 0.8* 0.8*  --         No results found     No results for input(s): INR, PT, PTT in the last 72 hours.    No results available in last 24 hours    Imaging    LAST X-RAY:  CT ABDOMEN PELVIS W CONTRAST   Final Result      1.   Hepatomegaly with suggestion of hepatic steatosis.      2.   No apparent diverticular disease.  Pericolonic inflammatory changes   superior to distal transverse colon noted which may be related to epiploic   appendagitis.  Acute diverticulitis is felt less likely.  Consider   follow-up to ensure resolution and further evaluation with colonoscopy if   clinically indicated.      Electronically Signed by: SULTANA SMALLS MD    Signed on: 2/13/2022 6:32 PM          XR CHEST PA AND LATERAL 2 VIEWS   Final Result   FINDINGS/IMPRESSION:      The lungs are  clear.  No infiltrates or effusions. No pneumothorax.   Pulmonary vascularity is within normal limits. The heart size and   mediastinal structures appear normal.  The visualized osseous structures   are grossly unremarkable.      Electronically Signed by: DIANE TORRES M.D.    Signed on: 2/13/2022 3:18 PM                 LAST U/S:  === 09/22/18 ===    US TRANSVAGINAL NON-OB    - Narrative -  Accession #    [id number]    US TRANSVAGINAL NON-OB  -  9/22/2018 4:28 PM    INDICATION:   LLQ pain, hx cyst    COMPARISON:   None.    TECHNIQUE: Transpelvic images are submitted for interpretation.    FINDINGS:    Uterus is surgically absent.  The right ovary is not visualized.  The left ovary measures 5.1 x 2.6 x 2.0 cm . Doppler flow is noted.  Hemorrhagic cyst is noted  which measures 1.5 x 2.0 x 2.6 cm.    There is no free fluid in the pelvis.    - Impression -  Post hysterectomy changes.  Nonvisualization of the right ovary.  Hemorrhagic left ovarian cyst  with Doppler flow noted in the left ovary.    *  F I N A L  *    Transcribed By: ILYA  09/22/18 4:55 pm    Dictated By:            VANESSA, SANDY IGLESIAS MD    Electronically Reviewed and Approved By:           VANESSA, SANDY IGLESIAS MD  09/22/18 4:57 pm      Cardiac studies:     ECG personally reviewed:  NSR, no ST or T wave changes concerning for ischemia  Encounter Date: 02/13/22   ECG   Result Value    Ventricular Rate EKG/Min (BPM) 79    Atrial Rate (BPM) 79    FL-Interval (MSEC) 104    QRS-Interval (MSEC) 74    QT-Interval (MSEC) 350    QTc 401    P Axis (Degrees) 61    R Axis (Degrees) 62    T Axis (Degrees) 31    REPORT TEXT      Sinus rhythm  with short FL  Nonspecific T wave abnormality  Abnormal ECG  When compared with ECG of  13-FEB-2022 14:16,  No significant change was found  Confirmed by MODE ALMENDAREZ MD ([id number]) on 2/14/2022 10:55:16 AM         LAST ECHO/ECHO STRESS:  No valid procedures specified.        Assessment and Plan  Patient is a 44-year-old  female with history of Lennox-Gastaut syndrome, anxiety disorder, and hysterectomy with laparoscopy and lysis of adhesions who is admitted for severe abdominal pain.      Generalized abdominal pain  -abd soft ,benign   . Has good bowel sounds  -CT of abdomen: No apparent diverticular disease.  Pericolonic inflammatory changes superior to distal transverse colon noted which may be related to epiploic appendagitis.    -d/w Dr Grover   -conservative management    -tolerating cld will advance to regular diet     Intractable nausea and vomiting  Hematemesis  -resolved   -started on PPI  -GI consult noted   -H/H stable     RRT   generalized body movement noted  Follow command after seizure over   Ativan not given   Not sure if true seizure   Page neurology   EEG ordered  On keppra and depakote         Lennox-Gastaut syndrome  -pseudo seizure ??  -seizure precautions  -continue home medications: on Depakote and Keppra   -appreciate neuro in put   -EEG noted  -pt stopped taking her seizure meds by herself   -psych on consult         Dehydration  -started  on IV hydration   -better   -will continue today     anxiety disorder  -continue home medications. Reviewed [place] , patient's last refill of Clonazepam in November 2021. No recent refill of Alprazolam.   -psych on consult     Insomnia  -continue Amitriptyline        Deconditioning   -using walker for fall in high school ??  -clinically knees look fine   -will check xr   -PT/OT to see    Discussed plan of care with nurse, patient.    DVT prophylaxis:. SCD,   Current Active Medications for DVT Prophylaxis (From admission, onward)         Stop     enoxaparin (LOVENOX) injection 40 mg  40 mg,   Subcutaneous,   DAILY         --               Diet: :  Code status:  Code Status Information     Code Status    Full Resuscitation        Disposition:  PCP: MIKHAIL Foster MD     Time spend 35  min ,greater than 50% of the time spent reviewing the patient records,  coordinating patient care plan and discussing the above care plan  with the patient ,Nurse and the consultant .     Admission Reconciliation is Completed  Discharge Reconciliation is Not Complete Admission Reconciliation is Completed  Discharge Reconciliation is Completed

## 2022-03-08 ENCOUNTER — NON-APPOINTMENT (OUTPATIENT)
Age: 77
End: 2022-03-08

## 2022-03-08 ENCOUNTER — APPOINTMENT (OUTPATIENT)
Dept: CARDIOLOGY | Facility: CLINIC | Age: 77
End: 2022-03-08
Payer: MEDICARE

## 2022-03-08 VITALS
OXYGEN SATURATION: 96 % | HEART RATE: 85 BPM | HEIGHT: 68 IN | DIASTOLIC BLOOD PRESSURE: 71 MMHG | SYSTOLIC BLOOD PRESSURE: 122 MMHG | WEIGHT: 208 LBS | BODY MASS INDEX: 31.52 KG/M2

## 2022-03-08 DIAGNOSIS — I25.10 ATHEROSCLEROTIC HEART DISEASE OF NATIVE CORONARY ARTERY W/OUT ANGINA PECTORIS: ICD-10-CM

## 2022-03-08 PROCEDURE — 93000 ELECTROCARDIOGRAM COMPLETE: CPT

## 2022-03-08 PROCEDURE — 99214 OFFICE O/P EST MOD 30 MIN: CPT

## 2022-03-08 RX ORDER — OMEPRAZOLE 40 MG/1
40 CAPSULE, DELAYED RELEASE ORAL
Qty: 30 | Refills: 0 | Status: ACTIVE | COMMUNITY
Start: 2021-09-13

## 2022-03-08 RX ORDER — LAMOTRIGINE 25 MG/1
25 TABLET ORAL
Qty: 30 | Refills: 0 | Status: DISCONTINUED | COMMUNITY
Start: 2021-11-14

## 2022-03-08 RX ORDER — ISOSORBIDE MONONITRATE 60 MG/1
60 TABLET, EXTENDED RELEASE ORAL
Qty: 30 | Refills: 0 | Status: DISCONTINUED | COMMUNITY
Start: 2021-10-28

## 2022-03-08 RX ORDER — DOXAZOSIN 4 MG/1
4 TABLET ORAL
Qty: 60 | Refills: 0 | Status: ACTIVE | COMMUNITY
Start: 2022-01-28

## 2022-03-08 RX ORDER — ARIPIPRAZOLE LAUROXIL 1064 MG/3.9ML
1064 INJECTION, SUSPENSION, EXTENDED RELEASE INTRAMUSCULAR
Qty: 4 | Refills: 0 | Status: DISCONTINUED | COMMUNITY
Start: 2021-12-11

## 2022-03-08 RX ORDER — DOXEPIN HYDROCHLORIDE 25 MG/1
25 CAPSULE ORAL
Qty: 30 | Refills: 0 | Status: DISCONTINUED | COMMUNITY
Start: 2021-08-11

## 2022-03-08 RX ORDER — HYDRALAZINE HYDROCHLORIDE 100 MG/1
100 TABLET ORAL
Qty: 90 | Refills: 0 | Status: DISCONTINUED | COMMUNITY
Start: 2022-01-28

## 2022-03-08 RX ORDER — DIGOXIN 125 UG/1
125 TABLET ORAL
Qty: 30 | Refills: 0 | Status: DISCONTINUED | COMMUNITY
Start: 2021-09-14

## 2022-03-08 RX ORDER — SENNOSIDES 8.6 MG/1
8.6 TABLET ORAL
Qty: 60 | Refills: 0 | Status: DISCONTINUED | COMMUNITY
Start: 2022-01-28

## 2022-03-08 RX ORDER — CLONIDINE HYDROCHLORIDE 0.1 MG/1
0.1 TABLET ORAL
Qty: 180 | Refills: 0 | Status: DISCONTINUED | COMMUNITY
Start: 2021-09-14

## 2022-03-08 RX ORDER — FAMOTIDINE 40 MG/1
40 TABLET, FILM COATED ORAL
Qty: 60 | Refills: 0 | Status: DISCONTINUED | COMMUNITY
Start: 2022-02-25

## 2022-03-08 RX ORDER — ASPIRIN 81 MG/1
81 TABLET, FILM COATED ORAL
Qty: 30 | Refills: 0 | Status: DISCONTINUED | COMMUNITY
Start: 2021-10-16

## 2022-03-08 RX ORDER — NIFEDIPINE 60 MG/1
60 TABLET, FILM COATED, EXTENDED RELEASE ORAL
Qty: 30 | Refills: 0 | Status: DISCONTINUED | COMMUNITY
Start: 2021-10-16

## 2022-03-08 RX ORDER — FUROSEMIDE 40 MG/1
40 TABLET ORAL
Qty: 30 | Refills: 0 | Status: DISCONTINUED | COMMUNITY
Start: 2021-09-07

## 2022-03-08 RX ORDER — ISOSORBIDE DINITRATE 30 MG/1
30 TABLET ORAL
Qty: 30 | Refills: 0 | Status: DISCONTINUED | COMMUNITY
Start: 2022-01-28

## 2022-03-08 RX ORDER — ARIPIPRAZOLE 30 MG/1
30 TABLET ORAL
Refills: 0 | Status: DISCONTINUED | COMMUNITY
End: 2022-03-08

## 2022-03-08 RX ORDER — NEBIVOLOL HYDROCHLORIDE 5 MG/1
5 TABLET ORAL
Qty: 30 | Refills: 0 | Status: DISCONTINUED | COMMUNITY
Start: 2021-09-14

## 2022-03-08 RX ORDER — ICOSAPENT ETHYL 1 G/1
1 CAPSULE ORAL
Qty: 120 | Refills: 0 | Status: DISCONTINUED | COMMUNITY
Start: 2021-05-18

## 2022-03-08 RX ORDER — MELOXICAM 7.5 MG/1
7.5 TABLET ORAL
Qty: 60 | Refills: 0 | Status: DISCONTINUED | COMMUNITY
Start: 2021-09-08

## 2022-03-08 RX ORDER — BUDESONIDE AND FORMOTEROL FUMARATE DIHYDRATE 80; 4.5 UG/1; UG/1
80-4.5 AEROSOL RESPIRATORY (INHALATION)
Qty: 10 | Refills: 0 | Status: DISCONTINUED | COMMUNITY
Start: 2022-01-28

## 2022-03-08 RX ORDER — BUSPIRONE HYDROCHLORIDE 15 MG/1
15 TABLET ORAL
Qty: 60 | Refills: 0 | Status: DISCONTINUED | COMMUNITY
Start: 2021-08-11

## 2022-03-08 RX ORDER — NIFEDIPINE 30 MG/1
30 TABLET, FILM COATED, EXTENDED RELEASE ORAL
Qty: 30 | Refills: 0 | Status: DISCONTINUED | COMMUNITY
Start: 2021-10-14

## 2022-03-08 RX ORDER — METFORMIN HYDROCHLORIDE 500 MG/1
500 TABLET, COATED ORAL
Qty: 60 | Refills: 0 | Status: ACTIVE | COMMUNITY
Start: 2022-02-25

## 2022-03-08 RX ORDER — OXYBUTYNIN CHLORIDE 15 MG/1
15 TABLET, EXTENDED RELEASE ORAL
Qty: 30 | Refills: 0 | Status: ACTIVE | COMMUNITY
Start: 2021-12-07

## 2022-03-08 RX ORDER — MULTIVIT-MIN/FOLIC/VIT K/LYCOP 400-300MCG
25 MCG TABLET ORAL
Qty: 100 | Refills: 0 | Status: ACTIVE | COMMUNITY
Start: 2021-08-11

## 2022-03-08 RX ORDER — LABETALOL HYDROCHLORIDE 100 MG/1
100 TABLET, FILM COATED ORAL
Qty: 30 | Refills: 0 | Status: DISCONTINUED | COMMUNITY
Start: 2022-01-28

## 2022-03-15 NOTE — PROGRESS NOTE ADULT - ASSESSMENT
April 13, 2022       Vidya Pleitez  [address] [facility] [address]        Dear Ms. Pleitez,    The results of the following Laboratory tests have returned:       Other information:  Normal CEA.     If you have any questions or concerns regarding this letter, please do not hesitate to call.    Sincerely,    Israel Conklin MD  [facility]  [address], [address]  [place] [address] [phone]       76M from group home, with PMH of DM2, HTN, HLD, CAD (s/p stents), Afib (on Eliquis), stage 3 CKD, hx of MGUS    HFPEF  CKD  Anemia  Ex Smoker  COVID - viral pna  Hypoxemia  Anxious  CAD  AF    remains covid pos  on PO LASIX BID   cardio - renal f/u noted  on RA    remains covid pos -   isolation precs  completed Remdesivir - Decadron  optimization of [facility] rx regimen  cxr on admission - 1- - viral pna - worsening when compared to prior films  D dimer - serially - DVT p - on ELIQUIS  Consideration for Judicious Diuresis - As per Renal and Cardio  Anxiolytics  Emotional support

## 2022-03-30 ENCOUNTER — INPATIENT (INPATIENT)
Facility: HOSPITAL | Age: 77
LOS: 4 days | Discharge: ANOTHER HOSPITAL | DRG: 378 | End: 2022-04-04
Attending: INTERNAL MEDICINE | Admitting: INTERNAL MEDICINE
Payer: MEDICARE

## 2022-03-30 VITALS
HEART RATE: 69 BPM | HEIGHT: 68 IN | TEMPERATURE: 98 F | RESPIRATION RATE: 18 BRPM | SYSTOLIC BLOOD PRESSURE: 133 MMHG | WEIGHT: 220.9 LBS | DIASTOLIC BLOOD PRESSURE: 81 MMHG | OXYGEN SATURATION: 96 %

## 2022-03-30 DIAGNOSIS — R91.8 OTHER NONSPECIFIC ABNORMAL FINDING OF LUNG FIELD: ICD-10-CM

## 2022-03-30 DIAGNOSIS — I25.10 ATHEROSCLEROTIC HEART DISEASE OF NATIVE CORONARY ARTERY WITHOUT ANGINA PECTORIS: ICD-10-CM

## 2022-03-30 DIAGNOSIS — I66.9 OCCLUSION AND STENOSIS OF UNSPECIFIED CEREBRAL ARTERY: Chronic | ICD-10-CM

## 2022-03-30 DIAGNOSIS — Z98.49 CATARACT EXTRACTION STATUS, UNSPECIFIED EYE: Chronic | ICD-10-CM

## 2022-03-30 DIAGNOSIS — K92.2 GASTROINTESTINAL HEMORRHAGE, UNSPECIFIED: ICD-10-CM

## 2022-03-30 DIAGNOSIS — Z98.89 OTHER SPECIFIED POSTPROCEDURAL STATES: Chronic | ICD-10-CM

## 2022-03-30 DIAGNOSIS — L05.91 PILONIDAL CYST WITHOUT ABSCESS: Chronic | ICD-10-CM

## 2022-03-30 DIAGNOSIS — N44.00 TORSION OF TESTIS, UNSPECIFIED: Chronic | ICD-10-CM

## 2022-03-30 DIAGNOSIS — K62.5 HEMORRHAGE OF ANUS AND RECTUM: ICD-10-CM

## 2022-03-30 DIAGNOSIS — E78.5 HYPERLIPIDEMIA, UNSPECIFIED: ICD-10-CM

## 2022-03-30 DIAGNOSIS — F41.9 ANXIETY DISORDER, UNSPECIFIED: ICD-10-CM

## 2022-03-30 DIAGNOSIS — I10 ESSENTIAL (PRIMARY) HYPERTENSION: ICD-10-CM

## 2022-03-30 DIAGNOSIS — Z29.9 ENCOUNTER FOR PROPHYLACTIC MEASURES, UNSPECIFIED: ICD-10-CM

## 2022-03-30 DIAGNOSIS — I50.9 HEART FAILURE, UNSPECIFIED: ICD-10-CM

## 2022-03-30 DIAGNOSIS — I48.91 UNSPECIFIED ATRIAL FIBRILLATION: ICD-10-CM

## 2022-03-30 DIAGNOSIS — E11.9 TYPE 2 DIABETES MELLITUS WITHOUT COMPLICATIONS: ICD-10-CM

## 2022-03-30 DIAGNOSIS — D64.9 ANEMIA, UNSPECIFIED: ICD-10-CM

## 2022-03-30 DIAGNOSIS — D47.2 MONOCLONAL GAMMOPATHY: ICD-10-CM

## 2022-03-30 DIAGNOSIS — N18.30 CHRONIC KIDNEY DISEASE, STAGE 3 UNSPECIFIED: ICD-10-CM

## 2022-03-30 LAB
ALBUMIN SERPL ELPH-MCNC: 3.3 G/DL — SIGNIFICANT CHANGE UP (ref 3.3–5)
ALP SERPL-CCNC: 62 U/L — SIGNIFICANT CHANGE UP (ref 40–120)
ALT FLD-CCNC: 15 U/L — SIGNIFICANT CHANGE UP (ref 12–78)
ANION GAP SERPL CALC-SCNC: 8 MMOL/L — SIGNIFICANT CHANGE UP (ref 5–17)
APTT BLD: 41.9 SEC — HIGH (ref 27.5–35.5)
AST SERPL-CCNC: 14 U/L — LOW (ref 15–37)
BASOPHILS # BLD AUTO: 0.02 K/UL — SIGNIFICANT CHANGE UP (ref 0–0.2)
BASOPHILS NFR BLD AUTO: 0.3 % — SIGNIFICANT CHANGE UP (ref 0–2)
BILIRUB SERPL-MCNC: 0.2 MG/DL — SIGNIFICANT CHANGE UP (ref 0.2–1.2)
BLD GP AB SCN SERPL QL: SIGNIFICANT CHANGE UP
BUN SERPL-MCNC: 48 MG/DL — HIGH (ref 7–23)
CALCIUM SERPL-MCNC: 9.6 MG/DL — SIGNIFICANT CHANGE UP (ref 8.5–10.1)
CHLORIDE SERPL-SCNC: 106 MMOL/L — SIGNIFICANT CHANGE UP (ref 96–108)
CO2 SERPL-SCNC: 25 MMOL/L — SIGNIFICANT CHANGE UP (ref 22–31)
CREAT SERPL-MCNC: 2.3 MG/DL — HIGH (ref 0.5–1.3)
EGFR: 29 ML/MIN/1.73M2 — LOW
EOSINOPHIL # BLD AUTO: 0.2 K/UL — SIGNIFICANT CHANGE UP (ref 0–0.5)
EOSINOPHIL NFR BLD AUTO: 3 % — SIGNIFICANT CHANGE UP (ref 0–6)
GLUCOSE SERPL-MCNC: 127 MG/DL — HIGH (ref 70–99)
HCT VFR BLD CALC: 27.5 % — LOW (ref 39–50)
HGB BLD-MCNC: 9.2 G/DL — LOW (ref 13–17)
IMM GRANULOCYTES NFR BLD AUTO: 0.3 % — SIGNIFICANT CHANGE UP (ref 0–1.5)
INR BLD: 2.01 RATIO — HIGH (ref 0.88–1.16)
LYMPHOCYTES # BLD AUTO: 0.78 K/UL — LOW (ref 1–3.3)
LYMPHOCYTES # BLD AUTO: 11.8 % — LOW (ref 13–44)
MCHC RBC-ENTMCNC: 27.6 PG — SIGNIFICANT CHANGE UP (ref 27–34)
MCHC RBC-ENTMCNC: 33.5 GM/DL — SIGNIFICANT CHANGE UP (ref 32–36)
MCV RBC AUTO: 82.6 FL — SIGNIFICANT CHANGE UP (ref 80–100)
MONOCYTES # BLD AUTO: 0.48 K/UL — SIGNIFICANT CHANGE UP (ref 0–0.9)
MONOCYTES NFR BLD AUTO: 7.3 % — SIGNIFICANT CHANGE UP (ref 2–14)
NEUTROPHILS # BLD AUTO: 5.12 K/UL — SIGNIFICANT CHANGE UP (ref 1.8–7.4)
NEUTROPHILS NFR BLD AUTO: 77.3 % — HIGH (ref 43–77)
NRBC # BLD: 0 /100 WBCS — SIGNIFICANT CHANGE UP (ref 0–0)
OB PNL STL: POSITIVE
PLATELET # BLD AUTO: 189 K/UL — SIGNIFICANT CHANGE UP (ref 150–400)
POTASSIUM SERPL-MCNC: 3.8 MMOL/L — SIGNIFICANT CHANGE UP (ref 3.5–5.3)
POTASSIUM SERPL-SCNC: 3.8 MMOL/L — SIGNIFICANT CHANGE UP (ref 3.5–5.3)
PROT SERPL-MCNC: 6.3 G/DL — SIGNIFICANT CHANGE UP (ref 6–8.3)
PROTHROM AB SERPL-ACNC: 23.7 SEC — HIGH (ref 10.5–13.4)
RBC # BLD: 3.33 M/UL — LOW (ref 4.2–5.8)
RBC # FLD: 17.1 % — HIGH (ref 10.3–14.5)
SARS-COV-2 RNA SPEC QL NAA+PROBE: SIGNIFICANT CHANGE UP
SODIUM SERPL-SCNC: 139 MMOL/L — SIGNIFICANT CHANGE UP (ref 135–145)
WBC # BLD: 6.62 K/UL — SIGNIFICANT CHANGE UP (ref 3.8–10.5)
WBC # FLD AUTO: 6.62 K/UL — SIGNIFICANT CHANGE UP (ref 3.8–10.5)

## 2022-03-30 PROCEDURE — 71250 CT THORAX DX C-: CPT | Mod: 26,MA

## 2022-03-30 PROCEDURE — 99222 1ST HOSP IP/OBS MODERATE 55: CPT

## 2022-03-30 PROCEDURE — 74176 CT ABD & PELVIS W/O CONTRAST: CPT | Mod: 26,MA

## 2022-03-30 PROCEDURE — 93010 ELECTROCARDIOGRAM REPORT: CPT

## 2022-03-30 PROCEDURE — 71045 X-RAY EXAM CHEST 1 VIEW: CPT | Mod: 26

## 2022-03-30 PROCEDURE — 99285 EMERGENCY DEPT VISIT HI MDM: CPT

## 2022-03-30 PROCEDURE — 70450 CT HEAD/BRAIN W/O DYE: CPT | Mod: 26,MA

## 2022-03-30 RX ORDER — HYDRALAZINE HCL 50 MG
100 TABLET ORAL THREE TIMES A DAY
Refills: 0 | Status: DISCONTINUED | OUTPATIENT
Start: 2022-03-30 | End: 2022-04-04

## 2022-03-30 RX ORDER — ACETAMINOPHEN 500 MG
650 TABLET ORAL EVERY 6 HOURS
Refills: 0 | Status: DISCONTINUED | OUTPATIENT
Start: 2022-03-30 | End: 2022-04-04

## 2022-03-30 RX ORDER — INSULIN LISPRO 100/ML
VIAL (ML) SUBCUTANEOUS AT BEDTIME
Refills: 0 | Status: DISCONTINUED | OUTPATIENT
Start: 2022-03-30 | End: 2022-04-04

## 2022-03-30 RX ORDER — INSULIN LISPRO 100/ML
VIAL (ML) SUBCUTANEOUS
Refills: 0 | Status: DISCONTINUED | OUTPATIENT
Start: 2022-03-30 | End: 2022-04-04

## 2022-03-30 RX ORDER — ISOSORBIDE MONONITRATE 60 MG/1
90 TABLET, EXTENDED RELEASE ORAL DAILY
Refills: 0 | Status: DISCONTINUED | OUTPATIENT
Start: 2022-03-30 | End: 2022-04-04

## 2022-03-30 RX ORDER — DEXTROSE 50 % IN WATER 50 %
12.5 SYRINGE (ML) INTRAVENOUS ONCE
Refills: 0 | Status: DISCONTINUED | OUTPATIENT
Start: 2022-03-30 | End: 2022-04-04

## 2022-03-30 RX ORDER — ONDANSETRON 8 MG/1
4 TABLET, FILM COATED ORAL EVERY 8 HOURS
Refills: 0 | Status: DISCONTINUED | OUTPATIENT
Start: 2022-03-30 | End: 2022-04-04

## 2022-03-30 RX ORDER — DEXTROSE 50 % IN WATER 50 %
25 SYRINGE (ML) INTRAVENOUS ONCE
Refills: 0 | Status: DISCONTINUED | OUTPATIENT
Start: 2022-03-30 | End: 2022-04-04

## 2022-03-30 RX ORDER — DOXAZOSIN MESYLATE 4 MG
4 TABLET ORAL
Refills: 0 | Status: DISCONTINUED | OUTPATIENT
Start: 2022-03-30 | End: 2022-04-04

## 2022-03-30 RX ORDER — PANTOPRAZOLE SODIUM 20 MG/1
40 TABLET, DELAYED RELEASE ORAL DAILY
Refills: 0 | Status: DISCONTINUED | OUTPATIENT
Start: 2022-03-30 | End: 2022-04-04

## 2022-03-30 RX ORDER — GLUCAGON INJECTION, SOLUTION 0.5 MG/.1ML
1 INJECTION, SOLUTION SUBCUTANEOUS ONCE
Refills: 0 | Status: DISCONTINUED | OUTPATIENT
Start: 2022-03-30 | End: 2022-04-04

## 2022-03-30 RX ORDER — BUDESONIDE AND FORMOTEROL FUMARATE DIHYDRATE 160; 4.5 UG/1; UG/1
2 AEROSOL RESPIRATORY (INHALATION)
Refills: 0 | Status: DISCONTINUED | OUTPATIENT
Start: 2022-03-30 | End: 2022-04-04

## 2022-03-30 RX ORDER — LAMOTRIGINE 25 MG/1
100 TABLET, ORALLY DISINTEGRATING ORAL DAILY
Refills: 0 | Status: DISCONTINUED | OUTPATIENT
Start: 2022-03-30 | End: 2022-04-04

## 2022-03-30 RX ORDER — FOLIC ACID 0.8 MG
1 TABLET ORAL DAILY
Refills: 0 | Status: DISCONTINUED | OUTPATIENT
Start: 2022-03-30 | End: 2022-04-04

## 2022-03-30 RX ORDER — OXYBUTYNIN CHLORIDE 5 MG
15 TABLET ORAL DAILY
Refills: 0 | Status: DISCONTINUED | OUTPATIENT
Start: 2022-03-30 | End: 2022-04-04

## 2022-03-30 RX ORDER — POLYETHYLENE GLYCOL 3350 17 G/17G
17 POWDER, FOR SOLUTION ORAL DAILY
Refills: 0 | Status: DISCONTINUED | OUTPATIENT
Start: 2022-03-30 | End: 2022-04-04

## 2022-03-30 RX ORDER — VENLAFAXINE HCL 75 MG
150 CAPSULE, EXT RELEASE 24 HR ORAL DAILY
Refills: 0 | Status: DISCONTINUED | OUTPATIENT
Start: 2022-03-30 | End: 2022-04-04

## 2022-03-30 RX ORDER — OXYBUTYNIN CHLORIDE 5 MG
15 TABLET ORAL DAILY
Refills: 0 | Status: DISCONTINUED | OUTPATIENT
Start: 2022-03-30 | End: 2022-03-30

## 2022-03-30 RX ORDER — PREGABALIN 225 MG/1
1000 CAPSULE ORAL DAILY
Refills: 0 | Status: DISCONTINUED | OUTPATIENT
Start: 2022-03-30 | End: 2022-04-04

## 2022-03-30 RX ORDER — FUROSEMIDE 40 MG
60 TABLET ORAL
Refills: 0 | Status: DISCONTINUED | OUTPATIENT
Start: 2022-03-30 | End: 2022-04-04

## 2022-03-30 RX ORDER — CARVEDILOL PHOSPHATE 80 MG/1
6.25 CAPSULE, EXTENDED RELEASE ORAL EVERY 12 HOURS
Refills: 0 | Status: DISCONTINUED | OUTPATIENT
Start: 2022-03-30 | End: 2022-04-04

## 2022-03-30 RX ORDER — SODIUM CHLORIDE 9 MG/ML
1000 INJECTION, SOLUTION INTRAVENOUS
Refills: 0 | Status: DISCONTINUED | OUTPATIENT
Start: 2022-03-30 | End: 2022-04-04

## 2022-03-30 RX ORDER — AMLODIPINE BESYLATE 2.5 MG/1
10 TABLET ORAL DAILY
Refills: 0 | Status: DISCONTINUED | OUTPATIENT
Start: 2022-03-30 | End: 2022-04-04

## 2022-03-30 RX ORDER — ATORVASTATIN CALCIUM 80 MG/1
10 TABLET, FILM COATED ORAL AT BEDTIME
Refills: 0 | Status: DISCONTINUED | OUTPATIENT
Start: 2022-03-30 | End: 2022-04-04

## 2022-03-30 RX ORDER — PANTOPRAZOLE SODIUM 20 MG/1
8 TABLET, DELAYED RELEASE ORAL
Qty: 80 | Refills: 0 | Status: DISCONTINUED | OUTPATIENT
Start: 2022-03-30 | End: 2022-03-30

## 2022-03-30 RX ORDER — SENNA PLUS 8.6 MG/1
2 TABLET ORAL AT BEDTIME
Refills: 0 | Status: DISCONTINUED | OUTPATIENT
Start: 2022-03-30 | End: 2022-04-04

## 2022-03-30 RX ORDER — LANOLIN ALCOHOL/MO/W.PET/CERES
3 CREAM (GRAM) TOPICAL AT BEDTIME
Refills: 0 | Status: DISCONTINUED | OUTPATIENT
Start: 2022-03-30 | End: 2022-04-04

## 2022-03-30 RX ORDER — DEXTROSE 50 % IN WATER 50 %
15 SYRINGE (ML) INTRAVENOUS ONCE
Refills: 0 | Status: DISCONTINUED | OUTPATIENT
Start: 2022-03-30 | End: 2022-04-04

## 2022-03-30 RX ORDER — PANTOPRAZOLE SODIUM 20 MG/1
40 TABLET, DELAYED RELEASE ORAL ONCE
Refills: 0 | Status: COMPLETED | OUTPATIENT
Start: 2022-03-30 | End: 2022-03-30

## 2022-03-30 RX ADMIN — SENNA PLUS 2 TABLET(S): 8.6 TABLET ORAL at 22:12

## 2022-03-30 RX ADMIN — PANTOPRAZOLE SODIUM 40 MILLIGRAM(S): 20 TABLET, DELAYED RELEASE ORAL at 11:04

## 2022-03-30 RX ADMIN — PANTOPRAZOLE SODIUM 10 MG/HR: 20 TABLET, DELAYED RELEASE ORAL at 14:32

## 2022-03-30 RX ADMIN — CARVEDILOL PHOSPHATE 6.25 MILLIGRAM(S): 80 CAPSULE, EXTENDED RELEASE ORAL at 18:57

## 2022-03-30 RX ADMIN — ATORVASTATIN CALCIUM 10 MILLIGRAM(S): 80 TABLET, FILM COATED ORAL at 22:15

## 2022-03-30 RX ADMIN — BUDESONIDE AND FORMOTEROL FUMARATE DIHYDRATE 2 PUFF(S): 160; 4.5 AEROSOL RESPIRATORY (INHALATION) at 19:45

## 2022-03-30 RX ADMIN — Medication 100 MILLIGRAM(S): at 22:12

## 2022-03-30 RX ADMIN — Medication 0.2 MILLIGRAM(S): at 22:12

## 2022-03-30 RX ADMIN — Medication 4 MILLIGRAM(S): at 22:12

## 2022-03-30 RX ADMIN — Medication 60 MILLIGRAM(S): at 18:57

## 2022-03-30 NOTE — H&P ADULT - PROBLEM SELECTOR PLAN 11
CT Chest showed three 2mm pulmonary nodules distributed amongst RUL, RLL, and DARELL    - patient to follow up with repeat CT chest in 12 months on outpatient basis

## 2022-03-30 NOTE — H&P ADULT - PROBLEM SELECTOR PLAN 7
Chronic, stable   - Patient's creatinine 2.3, near baseline of 2.2   - Patients GFR 29, near baseline   - avoid nephrotoxic medications as much as possible

## 2022-03-30 NOTE — H&P ADULT - PROBLEM SELECTOR PLAN 2
Likely iron deficiency anemia in the setting of GIB  - H/H 9.2 on admission, baseline hemoglobin ~10   - Currently hemodynamically stable, monitor for signs and symptoms of active bleeding  - Consider transfusion for hemoglobin <8   - Blood transfusion consent signed and placed in chart  - Maintain active type and screen  - F/u iron panel: iron, ferritin, TIBC, transferrin  - F/u folate, vitamin B12, TSH  - F/u AM CBC Likely Ac Blood loss anemia in the setting of GIB- AC on chronic anemia  - H/H 9.2 on admission, baseline hemoglobin ~10   - Currently hemodynamically stable, NO  active bleeding  - Consider transfusion for hemoglobin <8   - F/u iron panel: iron, ferritin, TIBC, transferrin  - F/u folate, vitamin B12, TSH  - F/u AM CBC

## 2022-03-30 NOTE — ED PROVIDER NOTE - PROGRESS NOTE DETAILS
called GI Dr. Jackson, noted Hg 9.2, rectal bleed, on eliquis, to be admitted, will consult notified cardiology Dr. Posey patient being admitted

## 2022-03-30 NOTE — H&P ADULT - ASSESSMENT
76 yo M PMH of A fib (on Eliquis) , MGUS s/p PRBC transfusion 10/21, anxiety/depression, CAD s/p stents (not on plavix), BPH, CHF, Diverticulosis, HLD, HTN, thyroid nodules, CKD stage 3, DM2 presents to the ED from [facility] after having up to 3 bowel movements with dark/black stool, being admitted for GIB/rectal bleeding.  78 yo M PMH of A fib (on Eliquis) , MGUS s/p PRBC transfusion 10/21, anxiety/depression, CAD s/p stents (not on plavix), BPH, CHF, Diverticulosis, HLD, HTN, thyroid nodules, CKD stage 3, DM2 presents to the ED from [facility] after having up to 3 bowel movements  admitted for GIB/rectal bleeding.

## 2022-03-30 NOTE — H&P ADULT - PROBLEM SELECTOR PLAN 9
Chronic, stable on admission  - Continue home amlodipine 10mg qD, clonidine 0.2mg TID, hydralazine 100mg TID, imdur 90mg qD with hold parameters  - Monitor routine hemodynamics Chronic, stable on admission  - Continue home amlodipine 10mg qD, clonidine 0.2mg TID, hydralazine 100mg TID, Imdur 90mg qD with hold parameters

## 2022-03-30 NOTE — CONSULT NOTE ADULT - SUBJECTIVE AND OBJECTIVE BOX
[facility] - Fifi Bliss, Bonifacio, Kalpesh, Fatimah, Loc Rosario  Office Number: [phone]    Initial Consult Note    CHIEF COMPLAINT: Patient is a 77y old  Male who presents with a chief complaint of rectal bleeding.    HPI:   77 year old male with past medical history of A fib (on Eliquis), CAD s/p stents ( CHF, HLD, HTN, CKD stage 3, DM2 GIB s/p PRBC transfusion 10/21, anxiety/depression,  recently admitted  for acute hypoxic respiratory failure 2/2 covid-19 pneumonia 1/3-1/18 then readmitted 1/23 with acute on chronic diastolic chf and readmitted 2/22 for chest pain now presenting with rectal bleeding. Reports two day history of rectal bleeding. He reports multiple episodes of diarrhea dark stool. On exam noted to have bright red blood on legs which he reports is from when he had diarrhea He admits to increased fatigue and palpitations. denies syncope, no chest pain shortness of breath. no fever chills     PAST MEDICAL & SURGICAL HISTORY:  HTN (hypertension)  c/b multiple episodes of hypertensive urgency    HLD (hyperlipidemia)    Atrial fibrillation  first documented on EKG 10/7/2021    CAD (coronary artery disease)  s/p stents (not on plavix)    Type 2 diabetes mellitus  not on home insulin/ Meds    Anxiety  Depression  multiple psych medications    History of diverticulitis  07/2021    Diverticulosis  c/b GIB in 2020    Afib  on AC    Stage 3 chronic kidney disease    Anemia of chronic disease  Monoclonal Gammopathy-MGUS  pRBC transfusion 10/15/21    Chronic diastolic congestive heart failure    Multiple thyroid nodules    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  Had surgery ( April 2013 )    S/P tonsillectomy    S/P arthroscopic knee surgery  Bilateral ( 2005 )    Torsion of testicle  Had surgery at age 14    Pilonidal cyst  Had surgery ( 1969 )    S/P cataract surgery  Bilateral    H/O hernia repair        SOCIAL HISTORY:  No tobacco, ethanol, or drug abuse.  denies   FAMILY HISTORY:  FHx: throat cancer    No family history of colorectal cancer      No family history of acute MI or sudden cardiac death.    MEDICATIONS  (STANDING):  pantoprazole Infusion 8 mG/Hr (10 mL/Hr) IV Continuous <Continuous>    MEDICATIONS  (PRN):      Allergies    No Known Allergies    Intolerances        REVIEW OF SYSTEMS:      All other review of systems is negative unless indicated above    VITAL SIGNS:   Vital Signs Last 24 Hrs  T(C): 36.4 (30 Mar 2022 10:01), Max: 36.4 (30 Mar 2022 10:01)  T(F): 97.5 (30 Mar 2022 10:01), Max: 97.5 (30 Mar 2022 10:01)  HR: 69 (30 Mar 2022 10:01) (69 - 69)  BP: 133/81 (30 Mar 2022 10:01) (133/81 - 133/81)  BP(mean): --  RR: 18 (30 Mar 2022 10:01) (18 - 18)  SpO2: 96% (30 Mar 2022 10:01) (96% - 96%)    I&O's Summary      On Exam:    Constitutional: NAD, alert and oriented x 3 obes  Lungs:  Non-labored, breath sounds are clear bilaterally, No wheezing, rales or rhonchi  Cardiovascular: RRR.  S1 and S2 positive.  No murmurs, rubs, gallops or clicks  Gastrointestinal: Bowel Sounds present, soft, nontender.   Lymph: trace chronic bl edema   Neurological: Alert, no focal deficits  Skin: No rashes or ulcers   Psych:  Mood & affect appropriate.    LABS: All Labs Reviewed:                        9.2    6.62  )-----------( 189      ( 30 Mar 2022 10:49 )             27.5     30 Mar 2022 10:49    139    |  106    |  48     ----------------------------<  127    3.8     |  25     |  2.30     Ca    9.6        30 Mar 2022 10:49    TPro  6.3    /  Alb  3.3    /  TBili  0.2    /  DBili  x      /  AST  14     /  ALT  15     /  AlkPhos  62     30 Mar 2022 10:49    PT/INR - ( 30 Mar 2022 10:49 )   PT: 23.7 sec;   INR: 2.01 ratio         PTT - ( 30 Mar 2022 10:49 )  PTT:41.9 sec      Blood Culture:         RADIOLOGY:    EKG:  < from: 12 Lead ECG (02.05.22 @ 10:34) >  Ventricular Rate 64 BPM    Atrial Rate 64 BPM    P-R Interval 320 ms    QRS Duration 112 ms    Q-T Interval 444 ms    QTC Calculation(Bazett) 458 ms    P Axis 16 degrees    R Axis 2 degrees    T Axis -18 degrees    Diagnosis Line Sinus rhythm with 1st degree AV block  Incomplete right bundle branch block  Nonspecific ST and T wave abnormality  Abnormal ECG  When compared with ECG of 21-JAN-2022 09:46,  Sinus rhythm has replaced Atrial fibrillation    < end of copied text >

## 2022-03-30 NOTE — CONSULT NOTE ADULT - NS ATTEND AMEND GEN_ALL_CORE FT
cad, af on ac, normal lvef  presents with gib  planned for endoscopy on Friday, will be considered optimized from a cv perspective, pending his clinical course over the next 48h

## 2022-03-30 NOTE — PATIENT PROFILE ADULT - FALL HARM RISK - HARM RISK INTERVENTIONS
Assistance with ambulation/Assistance OOB with selected safe patient handling equipment/Communicate Risk of Fall with Harm to all staff/Discuss with provider need for PT consult/Monitor gait and stability/Provide patient with walking aids - walker, cane, crutches/Reinforce activity limits and safety measures with patient and family/Sit up slowly, dangle for a short time, stand at bedside before walking/Tailored Fall Risk Interventions/Visual Cue: Yellow wristband and red socks/Bed in lowest position, wheels locked, appropriate side rails in place/Call bell, personal items and telephone in reach/Instruct patient to call for assistance before getting out of bed or chair/Non-slip footwear when patient is out of bed/[place] to call system/Physically safe environment - no spills, clutter or unnecessary equipment/Purposeful Proactive Rounding/Room/bathroom lighting operational, light cord in reach

## 2022-03-30 NOTE — H&P ADULT - ATTENDING COMMENTS
78 yo M PMH of A fib (on Eliquis) , MGUS s/p PRBC transfusion 10/21, anxiety/depression, CAD s/p stents (not on plavix), BPH, CHF, Diverticulosis, HLD, HTN, thyroid nodules, CKD stage 3, DM2 presents to the ED from [facility] after having up to 3 bowel movements  admitted for GIB/rectal bleeding.   Pt seen, Examined, case & care plan d/w pt, residents at detail.  D/W Cardiology-Dr Posey- HOLD ASA, Hold AC   D/W DR Bah-Colonoscopy on Friday, Clear liquid diet, HOLD AC/AP  AM Labs   SCD's for DVT ppx  PO diet

## 2022-03-30 NOTE — H&P ADULT - PROBLEM SELECTOR PLAN 4
s/p stents, chronic, stable   - hold home aspirin in the setting of GI/rectal bleeding   - continue home atorvastatin 10mg qHS

## 2022-03-30 NOTE — H&P ADULT - PROBLEM SELECTOR PLAN 5
Chronic, stable   - Patient does not appear volume overloaded on physical exam   - continue lasix 60mg BID w/ holding parameters for chronic leg swelling   - continue home imdur and home carvidelol w/ holding parameters Chronic, stable   - Patient does not appear volume overloaded on physical exam   - continue Lasix 60mg BID w/ holding parameters for chronic leg swelling   - continue home Imdur and home carvedilol w/ holding parameters

## 2022-03-30 NOTE — ED PROVIDER NOTE - OBJECTIVE STATEMENT
78 y/o male with a pmhx of CKD, Afib, diverticulitis, and history of blood clots in the brain brought to ED by EMS from formerly [facility] residency due to dark stools. The episodes started at 4am when he noticed "black tarry stools painting the bowel". Since then he has had 2 more episodes. He reports this is the second episode in the last 18 months. There is associated nausea, tiredness, weakness, dry mouth, thirst, and hunger. Denies fever, chills, vomiting, chest pain, SOB, constipation or diarrhea. The patient regularly takes Eliquis and aspirin, last dose was this morning. Does not regularly follow a GI. Most recent colonoscopy was 6-7 years ago, normal. Patient also reports gait unsteadiness for the past 2 weeks. Denies altered mental status, LOC, or syncope. Normally ambulates with assistance.

## 2022-03-30 NOTE — H&P ADULT - NSHPSOCIALHISTORY_GEN_ALL_CORE
Lives in [facility]   ADL independent   Ambulates w/ a walker   Former smoker, quit 45 years ago, ~8 pack years   Alcohol denies   Drugs denies   COVID Moderna x2

## 2022-03-30 NOTE — H&P ADULT - GASTROINTESTINAL COMMENTS
Periumbical 4/10 non-radiating sharp abdominal pain Periumbilical 4/10 non-radiating sharp abdominal pain

## 2022-03-30 NOTE — H&P ADULT - PROBLEM SELECTOR PLAN 3
Chronic, now in sinus rhythm, rate controlled  - TTE from January 2022 showed no mitral valve involvement   - Holding home eliquis in context of GI/rectal bleeding   - Monitor lytes and replete as needed  - Cardiology ([facility]) consulted, f/u recs Chronic, now in sinus rhythm, rate controlled  - TTE from January 2022 showed no mitral valve involvement   - Holding home Eliquis in context of GI/rectal bleeding   - Monitor lytes and replete as needed  - Cardiology ([facility]) consulted, f/u recs

## 2022-03-30 NOTE — ED ADULT NURSE REASSESSMENT NOTE - BP NONINVASIVE SYSTOLIC (MM HG)
154 89 year old female with pmhx HLD, afib on xeralto presents to ED c/o R sided lower back pain s/p mechanical fall. Pt was ambulating, states she leaned on a table which gave out from beneath her causing her to land on her right lower back. She was ambulatory after fall. Since reports pain with movement located in her right side. Denies head injury or loc, chest pain, sob, abd pain, n/v/d 89 year old female with pmhx HLD, afib on xeralto presents to ED c/o R sided lower back pain s/p mechanical fall. Pt was ambulating, states she leaned on a table which gave out from beneath her causing her to land on her right lower back. She was ambulatory after fall. Since reports pain with movement located in her right side. Denies head injury or loc, chest pain, sob, abd pain, n/v/d    Attendinyo female presents s/p fall.  pt was resting her hand on an unstable table which collapsed.  pt fell on her right side.  has right low back pain.  no shortness of breath.  no chest pain.

## 2022-03-30 NOTE — CONSULT NOTE ADULT - SUBJECTIVE AND OBJECTIVE BOX
[facility]  Radu Harrington PA-C  [facility] [place] [address]  [phone]      Chief Complaint:  Patient is a 77y old  Male who presents with a chief complaint of     HPI: 76 y/o male with a pmhx of CKD, Afib, diverticulitis, and history of blood clots in the brain brought to ED by EMS from [facility] due to dark stools. The episodes started at 4am when he noticed "black tarry stools painting the bowel". Since then he has had 2 more episodes. He reports this is the second episode in the last 18 months. There is associated nausea, tiredness, weakness, dry mouth, thirst, and hunger. Denies fever, chills, vomiting, chest pain, SOB, constipation or diarrhea. The patient regularly takes Eliquis and aspirin, last dose was this morning. Does not regularly follow a GI. Most recent colonoscopy was 6-7 years ago, normal. Patient also reports gait unsteadiness for the past 2 weeks. Denies altered mental status, LOC, or syncope. Normally ambulates with assistance.    Allergies:  No Known Allergies      Medications:  pantoprazole Infusion 8 mG/Hr IV Continuous <Continuous>      PMHX/PSHX:  Hypertension    Diabetes mellitus    Lupus    Hepatitis C    Anxiety and depression    CAD (coronary artery disease)    Diverticulosis    Hyperlipidemia    HTN (hypertension)    HLD (hyperlipidemia)    Atrial fibrillation    CAD (coronary artery disease)    Type 2 diabetes mellitus    Anxiety    History of diverticulitis    Diverticulosis    Afib    Stage 3 chronic kidney disease    Anemia of chronic disease    Chronic diastolic congestive heart failure    Multiple thyroid nodules    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    S/P tonsillectomy    S/P arthroscopic knee surgery    Torsion of testicle    Pilonidal cyst    S/P cataract surgery    H/O hernia repair        Family history:  No pertinent family history in first degree relatives    FHx: throat cancer    No family history of colorectal cancer        Social History:     ROS:     General:  No wt loss, fevers, chills, night sweats, fatigue,   Eyes:  Good vision, no reported pain  ENT:  No sore throat, pain, runny nose, dysphagia  CV:  No pain, palpitations, hypo/hypertension  Resp:  No dyspnea, cough, tachypnea, wheezing  GI:  No pain, No nausea, No vomiting, No diarrhea, No constipation, No weight loss, No fever, No pruritis, No rectal bleeding, No tarry stools, No dysphagia,  :  No pain, bleeding, incontinence, nocturia  Muscle:  No pain, weakness  Neuro:  No weakness, tingling, memory problems  Psych:  No fatigue, insomnia, mood problems, depression  Endocrine:  No polyuria, polydipsia, cold/heat intolerance  Heme:  No petechiae, ecchymosis, easy bruisability  Skin:  No rash, tattoos, scars, edema      PHYSICAL EXAM:   Vital Signs:  Vital Signs Last 24 Hrs  T(C): 36.4 (30 Mar 2022 10:01), Max: 36.4 (30 Mar 2022 10:01)  T(F): 97.5 (30 Mar 2022 10:01), Max: 97.5 (30 Mar 2022 10:01)  HR: 69 (30 Mar 2022 10:01) (69 - 69)  BP: 133/81 (30 Mar 2022 10:01) (133/81 - 133/81)  BP(mean): --  RR: 18 (30 Mar 2022 10:01) (18 - 18)  SpO2: 96% (30 Mar 2022 10:01) (96% - 96%)  Daily Height in cm: 172.72 (30 Mar 2022 10:01)    Daily     GENERAL:  Appears stated age,   HEENT:  NC/AT,    CHEST:  Full & symmetric excursion,   HEART:  Regular rhythm  ABDOMEN:  Soft, non-tender, non-distended,   EXTEREMITIES:  no cyanosis,clubbing or edema  SKIN:  No rash  NEURO:  Alert,    LABS:                        9.2    6.62  )-----------( 189      ( 30 Mar 2022 10:49 )             27.5     03-30    139  |  106  |  48<H>  ----------------------------<  127<H>  3.8   |  25  |  2.30<H>    Ca    9.6      30 Mar 2022 10:49    TPro  6.3  /  Alb  3.3  /  TBili  0.2  /  DBili  x   /  AST  14<L>  /  ALT  15  /  AlkPhos  62  03-30    LIVER FUNCTIONS - ( 30 Mar 2022 10:49 )  Alb: 3.3 g/dL / Pro: 6.3 g/dL / ALK PHOS: 62 U/L / ALT: 15 U/L / AST: 14 U/L / GGT: x           PT/INR - ( 30 Mar 2022 10:49 )   PT: 23.7 sec;   INR: 2.01 ratio         PTT - ( 30 Mar 2022 10:49 )  PTT:41.9 sec        Imaging:

## 2022-03-30 NOTE — H&P ADULT - PROBLEM SELECTOR PLAN 1
Patient presents with abdominal pain, rectal bleeding, fatigue likely 2/2 diverticular bleed  - H/H 9.2 on admission, baseline hemoglobin ~10   - CT a/p: diverticulosis without diverticulitis  - clear liquid diet    - Protonix 40 mg qD  - Hold antiplatelets, NSAIDs at this time, will restart pending clinical course  - SCDs, hold pharmacologic DVT ppx  - Currently hemodynamically stable, monitor for signs and symptoms of active bleeding  - Consider transfusion for hemoglobin <8   - Blood transfusion consent signed and placed in chart  - Type and screen up to date  - If profuse bleeding and/or hemodynamically unstable, obtain CT angiogram to localize bleeding and IR consultation for possible embolization  - F/u vitamin B12, folate, iron panel: iron, ferritin, TIBC, transferrin  - GI (Dr. Bah) consulted, f/u recs, colonoscopy friday Patient presents with abdominal pain, rectal bleeding, fatigue likely 2/2 diverticular bleed  - H/H 9.2 on admission, baseline hemoglobin ~10 HOLD Eliquis   - CT a/p: diverticulosis without diverticulitis  - clear liquid diet    - Protonix 40 mg qD  - Hold antiplatelets, NSAIDs at this time,   - SCDs, hold pharmacologic DVT ppx  - Currently hemodynamically stable, monitor for signs and symptoms of active bleeding  - Consider transfusion for hemoglobin <8   - Blood transfusion consent signed and placed in chart  - Type and screen up to date  - If profuse bleeding and/or hemodynamically unstable, obtain CT angiogram to localize bleeding and IR consultation for possible embolization  - F/u vitamin B12, folate, iron panel: iron, ferritin, TIBC, transferrin  - GI (Dr. Bah) consulted, f/u recs, colonoscopy Friday

## 2022-03-30 NOTE — ED PROVIDER NOTE - CLINICAL SUMMARY MEDICAL DECISION MAKING FREE TEXT BOX
rectal bleed, on eliquis, unsteady gait, f/u ct head, chest, abdomen/pelvis, labs, type and screen, protonix drip, admit

## 2022-03-30 NOTE — ED ADULT NURSE NOTE - NS ED NURSE RECORD ANOTHER HT AND WT
patient bought in by daughter for evaluation. as per patients daughter "he is not acting like himself." patient normally active, lives alone. patient is A&O x4 in triage. patient has a bruise on right elbow, an abrasion to the left underside of arm and a bump on the back right of his head. patient does not recall how he sustained the injuries. patients gait is unsteady.  unknown if patient is on blood thinners.  pt taken to critical care. Yes

## 2022-03-30 NOTE — ED ADULT NURSE NOTE - GASTROINTESTINAL ASSESSMENT
Pt is a 88 y/o male with PMHx of CAD s/p CABG, recent AAA repair, HTN, prior MI, PAD, HFrEF (~25%) here with:    Assessment:  1. CHB c/b with 9 second pause- s/p CRTD implantation (His bundle)   2. Shock- likely sedation related, short course of pressors, since resolved  3. DESIRAE on CKD    - S/p CRT-D, pacing appropriately   - No hematoma noted, monitor groin access site  - MAPs >70 but SBP low 100's, hold anti-HTNs for now, consider starting BB/ACE tomorrow  - Cont lasix given low EF, recent CHF exacerbation, monitor UOP, renal indices improving, cont to trend  - Avoid nephrotoxins, adhere to renal dose adjustments  - OOB to chair, PT consulted  - Incentive spirometry  - Start dash diet  - Cont asa/plavix/statin  - F/u with EP for dvt ppx    Dispo: Stable and optimized for transfer out of ICU. D/w ICU attending Dr Joshi, signed out to hospitalist team, Dr Hand - - -

## 2022-03-30 NOTE — H&P ADULT - NEGATIVE GASTROINTESTINAL SYMPTOMS
no vomiting/no diarrhea/no constipation no vomiting/no diarrhea/no constipation/no change in bowel habits/no flatulence/no abdominal pain

## 2022-03-30 NOTE — H&P ADULT - HISTORY OF PRESENT ILLNESS
78 yo M PMH of A fib (on Eliquis) , MGUS s/p PRBC transfusion 10/21, anxiety/depression, CAD s/p stents (not on plavix), BPH, CHF, Diverticulosis, HLD, HTN, thyroid nodules, CKD stage 3, DM2 presents to the ED from [facility] after having up to 3 bowel movements with dark/black stool. Patient would have another episode of dark stool in the ED. Patient denied consuming any beets or magnesium/calcium supplements recently. Patient states that onset of stool was very sudden. Patient recently admitted to [facility] in January for CHF exacerbation, improved w/ diuresis. Patient was seen and examined at bedside, denied any headache, cp/pressure, palpitations, myalgias, fevers/chills, SOB, hematuria, dysuria. Patient endorses lightheadedness/dizziness, 4/10 non-radiating sharp periumbilical pain, and chronic knee pain.     In ED:   Vitals: 97.5F, HR 69, 133/81, RR 18, 96% on RA   Labs significant for: Occult blood +, GFR 29, INR 2.01, Creatinine 2.3 (baseline 2.2)  Imaging: CT chest: Few 2mm nodules, coronary artery calcification  CT abd/pelvis: diverticulosis w/o diverticulitis   CT head: No acute intracranial hemorrhage or acute territorial infarct   EKG: Sinus rhythm w/ 1st degree heart block @ 60bpm, incomplete RBBB unchanged from prior ekg   Recieved: Pantoprazole infusion

## 2022-03-30 NOTE — H&P ADULT - GASTROINTESTINAL DETAILS
normal/soft/no distention/no masses palpable/bowel sounds normal/no rebound tenderness/no guarding/no rigidity

## 2022-03-30 NOTE — ED PROVIDER NOTE - CHPI ED SYMPTOMS NEG
no abdominal distension/no diarrhea/no dysuria/no fever/no hematuria/no vomiting/no burning urination/no chills

## 2022-03-31 ENCOUNTER — TRANSCRIPTION ENCOUNTER (OUTPATIENT)
Age: 77
End: 2022-03-31

## 2022-03-31 LAB
A1C WITH ESTIMATED AVERAGE GLUCOSE RESULT: 5.9 % — HIGH (ref 4–5.6)
ALBUMIN SERPL ELPH-MCNC: 2.9 G/DL — LOW (ref 3.3–5)
ALP SERPL-CCNC: 51 U/L — SIGNIFICANT CHANGE UP (ref 40–120)
ALT FLD-CCNC: 13 U/L — SIGNIFICANT CHANGE UP (ref 12–78)
ANION GAP SERPL CALC-SCNC: 9 MMOL/L — SIGNIFICANT CHANGE UP (ref 5–17)
APPEARANCE UR: CLEAR — SIGNIFICANT CHANGE UP
AST SERPL-CCNC: 10 U/L — LOW (ref 15–37)
BASOPHILS # BLD AUTO: 0.02 K/UL — SIGNIFICANT CHANGE UP (ref 0–0.2)
BASOPHILS NFR BLD AUTO: 0.4 % — SIGNIFICANT CHANGE UP (ref 0–2)
BILIRUB SERPL-MCNC: 0.2 MG/DL — SIGNIFICANT CHANGE UP (ref 0.2–1.2)
BILIRUB UR-MCNC: NEGATIVE — SIGNIFICANT CHANGE UP
BUN SERPL-MCNC: 46 MG/DL — HIGH (ref 7–23)
CALCIUM SERPL-MCNC: 8.9 MG/DL — SIGNIFICANT CHANGE UP (ref 8.5–10.1)
CHLORIDE SERPL-SCNC: 105 MMOL/L — SIGNIFICANT CHANGE UP (ref 96–108)
CO2 SERPL-SCNC: 26 MMOL/L — SIGNIFICANT CHANGE UP (ref 22–31)
COLOR SPEC: SIGNIFICANT CHANGE UP
CREAT SERPL-MCNC: 2.3 MG/DL — HIGH (ref 0.5–1.3)
DIFF PNL FLD: NEGATIVE — SIGNIFICANT CHANGE UP
EGFR: 29 ML/MIN/1.73M2 — LOW
EOSINOPHIL # BLD AUTO: 0.2 K/UL — SIGNIFICANT CHANGE UP (ref 0–0.5)
EOSINOPHIL NFR BLD AUTO: 3.9 % — SIGNIFICANT CHANGE UP (ref 0–6)
EPI CELLS # UR: SIGNIFICANT CHANGE UP
ESTIMATED AVERAGE GLUCOSE: 123 MG/DL — HIGH (ref 68–114)
FERRITIN SERPL-MCNC: 40 NG/ML — SIGNIFICANT CHANGE UP (ref 30–400)
FOLATE SERPL-MCNC: >20 NG/ML — SIGNIFICANT CHANGE UP
GLUCOSE SERPL-MCNC: 120 MG/DL — HIGH (ref 70–99)
GLUCOSE UR QL: NEGATIVE — SIGNIFICANT CHANGE UP
HCT VFR BLD CALC: 21.5 % — LOW (ref 39–50)
HCT VFR BLD CALC: 24 % — LOW (ref 39–50)
HGB BLD-MCNC: 7.1 G/DL — LOW (ref 13–17)
HGB BLD-MCNC: 8.2 G/DL — LOW (ref 13–17)
IMM GRANULOCYTES NFR BLD AUTO: 0.4 % — SIGNIFICANT CHANGE UP (ref 0–1.5)
INR BLD: 1.51 RATIO — HIGH (ref 0.88–1.16)
IRON SATN MFR SERPL: 14 % — LOW (ref 16–55)
IRON SATN MFR SERPL: 33 UG/DL — LOW (ref 45–165)
KETONES UR-MCNC: NEGATIVE — SIGNIFICANT CHANGE UP
LEUKOCYTE ESTERASE UR-ACNC: ABNORMAL
LYMPHOCYTES # BLD AUTO: 0.96 K/UL — LOW (ref 1–3.3)
LYMPHOCYTES # BLD AUTO: 18.7 % — SIGNIFICANT CHANGE UP (ref 13–44)
MCHC RBC-ENTMCNC: 27.3 PG — SIGNIFICANT CHANGE UP (ref 27–34)
MCHC RBC-ENTMCNC: 33 GM/DL — SIGNIFICANT CHANGE UP (ref 32–36)
MCV RBC AUTO: 82.7 FL — SIGNIFICANT CHANGE UP (ref 80–100)
MONOCYTES # BLD AUTO: 0.4 K/UL — SIGNIFICANT CHANGE UP (ref 0–0.9)
MONOCYTES NFR BLD AUTO: 7.8 % — SIGNIFICANT CHANGE UP (ref 2–14)
NEUTROPHILS # BLD AUTO: 3.54 K/UL — SIGNIFICANT CHANGE UP (ref 1.8–7.4)
NEUTROPHILS NFR BLD AUTO: 68.8 % — SIGNIFICANT CHANGE UP (ref 43–77)
NITRITE UR-MCNC: NEGATIVE — SIGNIFICANT CHANGE UP
NRBC # BLD: 0 /100 WBCS — SIGNIFICANT CHANGE UP (ref 0–0)
PH UR: 5 — SIGNIFICANT CHANGE UP (ref 5–8)
PLATELET # BLD AUTO: 200 K/UL — SIGNIFICANT CHANGE UP (ref 150–400)
POTASSIUM SERPL-MCNC: 3.3 MMOL/L — LOW (ref 3.5–5.3)
POTASSIUM SERPL-SCNC: 3.3 MMOL/L — LOW (ref 3.5–5.3)
PROT SERPL-MCNC: 5.5 G/DL — LOW (ref 6–8.3)
PROT UR-MCNC: 30 MG/DL
PROTHROM AB SERPL-ACNC: 17.7 SEC — HIGH (ref 10.5–13.4)
RBC # BLD: 2.6 M/UL — LOW (ref 4.2–5.8)
RBC # FLD: 17.2 % — HIGH (ref 10.3–14.5)
RBC CASTS # UR COMP ASSIST: SIGNIFICANT CHANGE UP /HPF (ref 0–4)
SODIUM SERPL-SCNC: 140 MMOL/L — SIGNIFICANT CHANGE UP (ref 135–145)
SP GR SPEC: 1.01 — SIGNIFICANT CHANGE UP (ref 1.01–1.02)
TIBC SERPL-MCNC: 230 UG/DL — SIGNIFICANT CHANGE UP (ref 220–430)
TRANSFERRIN SERPL-MCNC: 172 MG/DL — LOW (ref 200–360)
TSH SERPL-MCNC: 1.19 UIU/ML — SIGNIFICANT CHANGE UP (ref 0.36–3.74)
UIBC SERPL-MCNC: 197 UG/DL — SIGNIFICANT CHANGE UP (ref 110–370)
UROBILINOGEN FLD QL: NEGATIVE — SIGNIFICANT CHANGE UP
VIT B12 SERPL-MCNC: 1442 PG/ML — HIGH (ref 232–1245)
WBC # BLD: 5.14 K/UL — SIGNIFICANT CHANGE UP (ref 3.8–10.5)
WBC # FLD AUTO: 5.14 K/UL — SIGNIFICANT CHANGE UP (ref 3.8–10.5)
WBC UR QL: SIGNIFICANT CHANGE UP

## 2022-03-31 PROCEDURE — 99232 SBSQ HOSP IP/OBS MODERATE 35: CPT

## 2022-03-31 RX ORDER — POTASSIUM CHLORIDE 20 MEQ
40 PACKET (EA) ORAL EVERY 4 HOURS
Refills: 0 | Status: COMPLETED | OUTPATIENT
Start: 2022-03-31 | End: 2022-03-31

## 2022-03-31 RX ORDER — SOD SULF/SODIUM/NAHCO3/KCL/PEG
1000 SOLUTION, RECONSTITUTED, ORAL ORAL EVERY 4 HOURS
Refills: 0 | Status: COMPLETED | OUTPATIENT
Start: 2022-03-31 | End: 2022-03-31

## 2022-03-31 RX ADMIN — LAMOTRIGINE 100 MILLIGRAM(S): 25 TABLET, ORALLY DISINTEGRATING ORAL at 12:05

## 2022-03-31 RX ADMIN — PREGABALIN 1000 MICROGRAM(S): 225 CAPSULE ORAL at 12:08

## 2022-03-31 RX ADMIN — Medication 1000 MILLILITER(S): at 18:14

## 2022-03-31 RX ADMIN — Medication 1: at 11:37

## 2022-03-31 RX ADMIN — Medication 0.2 MILLIGRAM(S): at 05:51

## 2022-03-31 RX ADMIN — ISOSORBIDE MONONITRATE 90 MILLIGRAM(S): 60 TABLET, EXTENDED RELEASE ORAL at 12:04

## 2022-03-31 RX ADMIN — SENNA PLUS 2 TABLET(S): 8.6 TABLET ORAL at 21:55

## 2022-03-31 RX ADMIN — Medication 0.2 MILLIGRAM(S): at 21:55

## 2022-03-31 RX ADMIN — Medication 1000 MILLILITER(S): at 21:55

## 2022-03-31 RX ADMIN — CARVEDILOL PHOSPHATE 6.25 MILLIGRAM(S): 80 CAPSULE, EXTENDED RELEASE ORAL at 17:36

## 2022-03-31 RX ADMIN — Medication 40 MILLIEQUIVALENT(S): at 14:21

## 2022-03-31 RX ADMIN — PANTOPRAZOLE SODIUM 40 MILLIGRAM(S): 20 TABLET, DELAYED RELEASE ORAL at 12:43

## 2022-03-31 RX ADMIN — Medication 0.2 MILLIGRAM(S): at 14:21

## 2022-03-31 RX ADMIN — Medication 10 MILLIGRAM(S): at 20:30

## 2022-03-31 RX ADMIN — Medication 60 MILLIGRAM(S): at 17:36

## 2022-03-31 RX ADMIN — Medication 60 MILLIGRAM(S): at 05:51

## 2022-03-31 RX ADMIN — Medication 10 MILLIGRAM(S): at 16:43

## 2022-03-31 RX ADMIN — Medication 100 MILLIGRAM(S): at 21:55

## 2022-03-31 RX ADMIN — Medication 1 MILLIGRAM(S): at 12:06

## 2022-03-31 RX ADMIN — Medication 40 MILLIEQUIVALENT(S): at 10:02

## 2022-03-31 RX ADMIN — Medication 150 MILLIGRAM(S): at 12:04

## 2022-03-31 RX ADMIN — Medication 15 MILLIGRAM(S): at 12:05

## 2022-03-31 RX ADMIN — AMLODIPINE BESYLATE 10 MILLIGRAM(S): 2.5 TABLET ORAL at 05:52

## 2022-03-31 RX ADMIN — ATORVASTATIN CALCIUM 10 MILLIGRAM(S): 80 TABLET, FILM COATED ORAL at 21:55

## 2022-03-31 RX ADMIN — Medication 4 MILLIGRAM(S): at 09:26

## 2022-03-31 RX ADMIN — BUDESONIDE AND FORMOTEROL FUMARATE DIHYDRATE 2 PUFF(S): 160; 4.5 AEROSOL RESPIRATORY (INHALATION) at 09:30

## 2022-03-31 RX ADMIN — Medication 4 MILLIGRAM(S): at 20:30

## 2022-03-31 RX ADMIN — Medication 100 MILLIGRAM(S): at 14:21

## 2022-03-31 RX ADMIN — CARVEDILOL PHOSPHATE 6.25 MILLIGRAM(S): 80 CAPSULE, EXTENDED RELEASE ORAL at 05:52

## 2022-03-31 RX ADMIN — Medication 100 MILLIGRAM(S): at 05:51

## 2022-03-31 NOTE — PROGRESS NOTE ADULT - PROBLEM SELECTOR PLAN 9
Chronic, stable on admission  - Continue home amlodipine 10mg qD, clonidine 0.2mg TID, hydralazine 100mg TID, Imdur 90mg qD with hold parameters

## 2022-03-31 NOTE — PROGRESS NOTE ADULT - SUBJECTIVE AND OBJECTIVE BOX
[facility] -- Fifi Bliss, Bonifacio, Kalpesh, Abraham Sheridan Savella  Office # [phone]      Follow Up:    gib   Subjective/Observations:    No complaints of chest pain, dyspnea, or palpitations reported. No signs of orthopnea or PND.  Complaints of generalized weakness , periods of dizziness     REVIEW OF SYSTEMS: All other review of systems is negative unless indicated above    PAST MEDICAL & SURGICAL HISTORY:  HTN (hypertension)  c/b multiple episodes of hypertensive urgency    HLD (hyperlipidemia)    Atrial fibrillation  first documented on EKG 10/7/2021    CAD (coronary artery disease)  s/p stents (not on plavix)    Type 2 diabetes mellitus  not on home insulin/ Meds    Anxiety  Depression  multiple psych medications    History of diverticulitis  07/2021    Diverticulosis  c/b GIB in 2020    Afib  on AC    Stage 3 chronic kidney disease    Anemia of chronic disease  Monoclonal Gammopathy-MGUS  pRBC transfusion 10/15/21    Chronic diastolic congestive heart failure    Multiple thyroid nodules    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  Had surgery ( April 2013 )    S/P tonsillectomy    S/P arthroscopic knee surgery  Bilateral ( 2005 )    Torsion of testicle  Had surgery at age 14    Pilonidal cyst  Had surgery ( 1969 )    S/P cataract surgery  Bilateral    H/O hernia repair        MEDICATIONS  (STANDING):  amLODIPine   Tablet 10 milliGRAM(s) Oral daily  atorvastatin 10 milliGRAM(s) Oral at bedtime  bisacodyl 10 milliGRAM(s) Oral every 4 hours  budesonide 160 MICROgram(s)/formoterol 4.5 MICROgram(s) Inhaler 2 Puff(s) Inhalation two times a day  carvedilol 6.25 milliGRAM(s) Oral every 12 hours  cloNIDine 0.2 milliGRAM(s) Oral three times a day  cyanocobalamin 1000 MICROGram(s) Oral daily  dextrose 5%. 1000 milliLiter(s) (100 mL/Hr) IV Continuous <Continuous>  dextrose 5%. 1000 milliLiter(s) (50 mL/Hr) IV Continuous <Continuous>  dextrose 50% Injectable 25 Gram(s) IV Push once  dextrose 50% Injectable 12.5 Gram(s) IV Push once  dextrose 50% Injectable 25 Gram(s) IV Push once  doxazosin 4 milliGRAM(s) Oral <User Schedule>  folic acid 1 milliGRAM(s) Oral daily  furosemide    Tablet 60 milliGRAM(s) Oral two times a day  glucagon  Injectable 1 milliGRAM(s) IntraMuscular once  hydrALAZINE 100 milliGRAM(s) Oral three times a day  insulin lispro (ADMELOG) corrective regimen sliding scale   SubCutaneous three times a day before meals  insulin lispro (ADMELOG) corrective regimen sliding scale   SubCutaneous at bedtime  isosorbide   mononitrate ER Tablet (IMDUR) 90 milliGRAM(s) Oral daily  lamoTRIgine 100 milliGRAM(s) Oral daily  oxybutynin XL 15 milliGRAM(s) Oral daily  pantoprazole  Injectable 40 milliGRAM(s) IV Push daily  polyethylene glycol 3350 17 Gram(s) Oral daily  polyethylene glycol/electrolyte Solution 1000 milliLiter(s) Oral every 4 hours  potassium chloride    Tablet ER 40 milliEquivalent(s) Oral every 4 hours  senna 2 Tablet(s) Oral at bedtime  venlafaxine XR. 150 milliGRAM(s) Oral daily    MEDICATIONS  (PRN):  acetaminophen     Tablet .. 650 milliGRAM(s) Oral every 6 hours PRN Temp greater or equal to 38C (100.4F), Mild Pain (1 - 3)  aluminum hydroxide/magnesium hydroxide/simethicone Suspension 30 milliLiter(s) Oral every 4 hours PRN Dyspepsia  dextrose Oral Gel 15 Gram(s) Oral once PRN Blood Glucose LESS THAN 70 milliGRAM(s)/deciliter  melatonin 3 milliGRAM(s) Oral at bedtime PRN Insomnia  ondansetron Injectable 4 milliGRAM(s) IV Push every 8 hours PRN Nausea and/or Vomiting      Allergies    No Known Allergies    Intolerances        Vital Signs Last 24 Hrs  T(C): 36.5 (31 Mar 2022 05:05), Max: 36.9 (30 Mar 2022 21:20)  T(F): 97.7 (31 Mar 2022 05:05), Max: 98.5 (30 Mar 2022 21:20)  HR: 76 (31 Mar 2022 05:05) (63 - 78)  BP: 124/72 (31 Mar 2022 05:05) (124/72 - 160/82)  BP(mean): --  RR: 18 (31 Mar 2022 05:05) (16 - 18)  SpO2: 94% (31 Mar 2022 05:05) (94% - 99%)    I&O's Summary        PHYSICAL EXAM:  TELE: not on tele   Constitutional: NAD, awake and alert, obese, pale   HEENT: Moist Mucous Membranes, Anicteric  Pulmonary: Non-labored, breath sounds are clear bilaterally, No wheezing, crackles or rhonchi  Cardiovascular: Regular, S1 and S2 nl, No murmurs, rubs, gallops or clicks  Gastrointestinal: Bowel Sounds present, soft, nontender.   Lymph: chronic le  peripheral edema.  Skin: No visible rashes or ulcers.  Psych:  Mood & affect appropriate    LABS: All Labs Reviewed:                        7.1    5.14  )-----------( 200      ( 31 Mar 2022 08:09 )             21.5                         9.2    6.62  )-----------( 189      ( 30 Mar 2022 10:49 )             27.5     31 Mar 2022 08:09    140    |  105    |  46     ----------------------------<  120    3.3     |  26     |  2.30   30 Mar 2022 10:49    139    |  106    |  48     ----------------------------<  127    3.8     |  25     |  2.30     Ca    8.9        31 Mar 2022 08:09  Ca    9.6        30 Mar 2022 10:49    TPro  5.5    /  Alb  2.9    /  TBili  0.2    /  DBili  x      /  AST  10     /  ALT  13     /  AlkPhos  51     31 Mar 2022 08:09  TPro  6.3    /  Alb  3.3    /  TBili  0.2    /  DBili  x      /  AST  14     /  ALT  15     /  AlkPhos  62     30 Mar 2022 10:49    PT/INR - ( 30 Mar 2022 10:49 )   PT: 23.7 sec;   INR: 2.01 ratio         PTT - ( 30 Mar 2022 10:49 )  PTT:41.9 sec  EKG:  < from: 12 Lead ECG (02.05.22 @ 10:34) >  Ventricular Rate 64 BPM    Atrial Rate 64 BPM    P-R Interval 320 ms    QRS Duration 112 ms    Q-T Interval 444 ms    QTC Calculation(Bazett) 458 ms    P Axis 16 degrees    R Axis 2 degrees    T Axis -18 degrees    Diagnosis Line Sinus rhythm with 1st degree AV block  Incomplete right bundle branch block  Nonspecific ST and T wave abnormality  Abnormal ECG  When compared with ECG of 21-JAN-2022 09:46,  Sinus rhythm has replaced Atrial fibrillation

## 2022-03-31 NOTE — PROGRESS NOTE ADULT - SUBJECTIVE AND OBJECTIVE BOX
Patient is a 77y old  Male who presents with a chief complaint of Rectal bleed (30 Mar 2022 15:40)      HPI:  76 yo M PMH of A fib (on Eliquis) , MGUS s/p PRBC transfusion 10/21, anxiety/depression, CAD s/p stents (not on plavix), BPH, CHF, Diverticulosis, HLD, HTN, thyroid nodules, CKD stage 3, DM2 presents to the ED from [facility] after having up to 3 bowel movements with dark/black stool. Patient would have another episode of dark stool in the ED. Patient denied consuming any beets or magnesium/calcium supplements recently. Patient states that onset of stool was very sudden. Patient recently admitted to [facility] in January for CHF exacerbation, improved w/ diuresis. Patient was seen and examined at bedside, denied any headache, cp/pressure, palpitations, myalgias, fevers/chills, SOB, hematuria, dysuria. Patient endorses lightheadedness/dizziness, 4/10 non-radiating sharp periumbilical pain, and chronic knee pain.     In ED:   Vitals: 97.5F, HR 69, 133/81, RR 18, 96% on RA   Labs significant for: Occult blood +, GFR 29, INR 2.01, Creatinine 2.3 (baseline 2.2)  Imaging: CT chest: Few 2mm nodules, coronary artery calcification  CT abd/pelvis: diverticulosis w/o diverticulitis   CT head: No acute intracranial hemorrhage or acute territorial infarct   EKG: Sinus rhythm w/ 1st degree heart block @ 60bpm, incomplete RBBB unchanged from prior ekg   Recieved: Pantoprazole infusion   (30 Mar 2022 15:40)      INTERVAL HPI:  3/31/2022: Patient had two more bowel movements with dark stool from yesterday. VSS. Patient seen and examined at bedside, denies any headache, cp/pressure/palpitations, abd pain, dysuria/hematuria, SOB, nausea/vomitting, fevers/chills. Patient continues to endorse lightheadedness/dizziness, and fatigue. AM hgb showed that patients hgb had dropped to 7.1 from 9.2, patient to receive 2 units of PRBC.          Home Medications:  Aristada 1064 mg/3.9 mL intramuscular suspension, extended release: 1 dose(s) intramuscular every 8 weeks (30 Mar 2022 15:58)  carvedilol 6.25 mg oral tablet: 1 tab(s) orally 2 times a day (30 Mar 2022 15:58)  cloNIDine 0.2 mg oral tablet: 1 tab(s) orally 3 times a day (30 Mar 2022 15:58)  diclofenac 1% topical gel: Apply topically to affected area 3 times a day (30 Mar 2022 15:58)  doxazosin 4 mg oral tablet: 1 tab(s) orally 2 times a day (30 Mar 2022 15:58)  furosemide 20 mg oral tablet: 3 tab(s) orally 2 times a day (30 Mar 2022 15:58)  lactobacillus acidophilus oral capsule: 1 cap(s) orally 2 times a day (30 Mar 2022 15:58)  lamoTRIgine 100 mg oral tablet: 1 tab(s) orally once a day (30 Mar 2022 15:58)  oxybutynin 15 mg/24 hr oral tablet, extended release: 1 tab(s) orally once a day (30 Mar 2022 15:58)  Vitamin B-12 1000 mcg oral tablet: 1 tab(s) orally once a day (30 Mar 2022 15:58)      MEDICATIONS  (STANDING):  amLODIPine   Tablet 10 milliGRAM(s) Oral daily  atorvastatin 10 milliGRAM(s) Oral at bedtime  budesonide 160 MICROgram(s)/formoterol 4.5 MICROgram(s) Inhaler 2 Puff(s) Inhalation two times a day  carvedilol 6.25 milliGRAM(s) Oral every 12 hours  cloNIDine 0.2 milliGRAM(s) Oral three times a day  cyanocobalamin 1000 MICROGram(s) Oral daily  dextrose 5%. 1000 milliLiter(s) (50 mL/Hr) IV Continuous <Continuous>  dextrose 5%. 1000 milliLiter(s) (100 mL/Hr) IV Continuous <Continuous>  dextrose 50% Injectable 25 Gram(s) IV Push once  dextrose 50% Injectable 12.5 Gram(s) IV Push once  dextrose 50% Injectable 25 Gram(s) IV Push once  doxazosin 4 milliGRAM(s) Oral <User Schedule>  folic acid 1 milliGRAM(s) Oral daily  furosemide    Tablet 60 milliGRAM(s) Oral two times a day  glucagon  Injectable 1 milliGRAM(s) IntraMuscular once  hydrALAZINE 100 milliGRAM(s) Oral three times a day  insulin lispro (ADMELOG) corrective regimen sliding scale   SubCutaneous three times a day before meals  insulin lispro (ADMELOG) corrective regimen sliding scale   SubCutaneous at bedtime  isosorbide   mononitrate ER Tablet (IMDUR) 90 milliGRAM(s) Oral daily  lamoTRIgine 100 milliGRAM(s) Oral daily  oxybutynin XL 15 milliGRAM(s) Oral daily  pantoprazole  Injectable 40 milliGRAM(s) IV Push daily  polyethylene glycol 3350 17 Gram(s) Oral daily  senna 2 Tablet(s) Oral at bedtime  venlafaxine XR. 150 milliGRAM(s) Oral daily    MEDICATIONS  (PRN):  acetaminophen     Tablet .. 650 milliGRAM(s) Oral every 6 hours PRN Temp greater or equal to 38C (100.4F), Mild Pain (1 - 3)  aluminum hydroxide/magnesium hydroxide/simethicone Suspension 30 milliLiter(s) Oral every 4 hours PRN Dyspepsia  dextrose Oral Gel 15 Gram(s) Oral once PRN Blood Glucose LESS THAN 70 milliGRAM(s)/deciliter  melatonin 3 milliGRAM(s) Oral at bedtime PRN Insomnia  ondansetron Injectable 4 milliGRAM(s) IV Push every 8 hours PRN Nausea and/or Vomiting      No Known Allergies      Social History:  Lives in [facility]   ADL independent   Ambulates w/ a walker   Former smoker, quit 45 years ago, ~8 pack years   Alcohol denies   Drugs denies   COVID Moderna x2 (30 Mar 2022 15:40)      REVIEW OF SYSTEMS:  CONSTITUTIONAL: No fever, No chills, [ x ] fatigue, No myalgia, No Body ache, No Weakness  EYES: No eye pain,  No visual disturbances, No discharge, No Redness  ENMT: No ear pain, No nose bleed, No vertigo; No sinus pain, No throat pain, No Congestion  NECK: No pain, No stiffness  RESPIRATORY: No cough, No wheezing, No hemoptysis, No shortness of breath  CARDIOVASCULAR: No chest pain, No palpitations  GASTROINTESTINAL: No abdominal pain, No epigastric pain. No nausea, No vomiting, No diarrhea, No constipation; [ x ] BM - dark stools  GENITOURINARY: No dysuria, No frequency, No urgency, No hematuria, No incontinence  NEUROLOGICAL: No headaches, [ x ] dizziness, No numbness, No tingling, No tremors, No weakness  EXTREMITIES: No Swelling, No Pain, No Edema  SKIN: [  ] No itching, burning, rashes, or lesions   MUSCULOSKELETAL: No joint pain, No joint swelling; No muscle pain, No back pain, No extremity pain  PSYCHIATRIC: No depression, No anxiety, No mood swings, No difficulty sleeping at night  PAIN SCALE: [ x ] None  [  ] Other-  ROS Unable to obtain due to: [  ] Dementia  [  ] Lethargy  [  ] Sedated  [  ] Non verbal  REST OF REVIEW OF SYSTEMS: [ x ] Normal     Vital Signs Last 24 Hrs  T(C): 36.5 (31 Mar 2022 05:05), Max: 36.9 (30 Mar 2022 21:20)  T(F): 97.7 (31 Mar 2022 05:05), Max: 98.5 (30 Mar 2022 21:20)  HR: 76 (31 Mar 2022 05:05) (63 - 78)  BP: 124/72 (31 Mar 2022 05:05) (124/72 - 160/82)  BP(mean): --  RR: 18 (31 Mar 2022 05:05) (16 - 18)  SpO2: 94% (31 Mar 2022 05:05) (94% - 99%)    CAPILLARY BLOOD GLUCOSE      POCT Blood Glucose.: 148 mg/dL (31 Mar 2022 07:24)  POCT Blood Glucose.: 122 mg/dL (30 Mar 2022 21:24)      I&O's Summary    PHYSICAL EXAM:  GENERAL:  [ x ] NAD, [  ] Well appearing, [  ] Agitated, [ x ] Drowsy, [  ] Lethargy, [  ] Confused   HEAD:  [ x ] Normal, [  ] Other  EYES:  [ x ] EOMI, [ x ] PERRLA, [  ] Conjunctiva and sclera clear normal, [ x ] Other - Right conjunctiva with lesion in LLQ, [ x ] Pallor, [  ] Discharge  ENMT:  [ x ] Normal, [ x ] Moist mucous membranes, [  ] Good dentition, [  ] No thrush  NECK:  [ x ] Supple, [ x ] No JVD, [ x ] Normal thyroid, [  ] Lymphadenopathy, [  ] Other  CHEST/LUNG:  [ x ] Clear to auscultation bilaterally, [ x ] Breath Sounds equal B/L, [  ] Poor effort, [ x ] No rales, [ x ] No rhonchi, [ x ] No wheezing  HEART:  [ x ] Regular rate and rhythm, [  ] Tachycardia, [  ] Bradycardia, [  ] Irregular, [ x ] No murmurs, No rubs, No gallops, [  ] PPM in place (Mfr:  )  ABDOMEN:  [ x ] Soft, [ x ] Nontender, [ x ] Nondistended, [ x ] No mass, [ x ] Bowel sounds present, [  ] Obese  NERVOUS SYSTEM:  [ x ] Alert & Oriented x3, [  ] Nonfocal, [  ] Confusion, [  ] Encephalopathic, [  ] Sedated, [  ] Unable to assess, [  ] Dementia, [  ] Other-  EXTREMITIES:  [ x ] 2+ Peripheral Pulses, No clubbing, No cyanosis,  [  ] Edema B/L lower EXT, [  ] PVD stasis skin changes B/L lower EXT, [  ] Wound  LYMPH:  No lymphadenopathy noted  SKIN:  [ x ] No rashes or lesions, [  ] Pressure ulcers, [  ] Ecchymosis, [  ] Skin tears, [  ] Other    DIET: Diet, Consistent Carbohydrate Clear Liquid (22 @ 16:58)      LABS:                        7.1    5.14  )-----------( 200      ( 31 Mar 2022 08:09 )             21.5     30 Mar 2022 10:49    139    |  106    |  48     ----------------------------<  127    3.8     |  25     |  2.30     Ca    9.6        30 Mar 2022 10:49    TPro  6.3    /  Alb  3.3    /  TBili  0.2    /  DBili  x      /  AST  14     /  ALT  15     /  AlkPhos  62     30 Mar 2022 10:49    PT/INR - ( 30 Mar 2022 10:49 )   PT: 23.7 sec;   INR: 2.01 ratio         PTT - ( 30 Mar 2022 10:49 )  PTT:41.9 sec  Urinalysis Basic - ( 31 Mar 2022 05:27 )    Color: Pale Yellow / Appearance: Clear / S.015 / pH: x  Gluc: x / Ketone: Negative  / Bili: Negative / Urobili: Negative   Blood: x / Protein: 30 mg/dL / Nitrite: Negative   Leuk Esterase: Trace / RBC: 0-2 /HPF / WBC 3-5   Sq Epi: x / Non Sq Epi: Occasional / Bacteria: x          RADIOLOGY & ADDITIONAL TESTS:  < from: CT Chest No Cont (22 @ 12:05) >    ACC: [id number] EXAM:  CT ABDOMEN AND PELVIS                        ACC: [id number] EXAM:  CT CHEST                          PROCEDURE DATE:  2022          INTERPRETATION:  CLINICAL INFORMATION: Rectal bleed and shortness of   breath    COMPARISON: CT chest 10/18/2021. CT chest abdomen pelvis 10/7/2021. Right   upper quadrant ultrasound 2021.    CONTRAST/COMPLICATIONS:  IV Contrast: NONE  Oral Contrast: NONE  Complications: None reported at time of study completion    PROCEDURE:  CT of the Chest, Abdomen and Pelvis was performed.  Sagittal and coronal reformats were performed.    FINDINGS:    CHEST:  LUNGS AND LARGE AIRWAYS: Central airways are patent. No focal   consolidation. Dependent scarring and minimal groundglass. Few scattered   2 mm pulmonary nodules. For reference, right upper lobe 2 mm nodule image   29 series 4, right lower lobe 2 mm nodule image 88 series 4, left upper   lobe 2 mm nodule image 26 series 4.  PLEURA: No pleural effusion or pneumothorax.  VESSELS: Atheromatous changes of the thoracic aorta. Main pulmonary   artery size is mildly enlarged, 3.2 cm.  HEART: Qualitatively, heart size is prominent. Aortic root/valve   calcification and coronary artery calcification. Trace pericardial fluid.  MEDIASTINUM AND SANDIP: No enlarged lymph nodes of the thorax. Small fluid   of the right infrahilar region near the right inferior pulmonary vein is   unchanged on multiple prior studies dating back to 2021, possibly a   pericardial cyst or recess. Esophagus is underdistended.  CHEST WALL AND LOWER NECK: No chest wall hematoma.    ABDOMEN AND PELVIS:    Solid organ evaluation limited due to noncontrast technique.    LIVER: Borderline enlarged liver measuring 18 cm in craniocaudal   dimension.  BILE DUCTS:No biliary distention  GALLBLADDER: Unremarkable CT appearance  SPLEEN: Normal size  PANCREAS: No main ductal dilatation  ADRENALS: Left adrenal gland thickening, similar to prior  KIDNEYS/URETERS: No hydronephrosis. Renal cysts, suboptimally   characterized due to lack of contrast.    BLADDER: Minimally distended.  REPRODUCTIVE ORGANS: Few coarse calcifications of the prostate    BOWEL: Stomach is underdistended. No small bowel distention. Normal   appendix. Mild stool burden of the colon limitsevaluation of the colonic   mucosa. Colonic diverticulosis, without evidence of acute diverticulitis.  PERITONEUM: No ascites.  VESSELS: No aneurysm of the abdominal aorta. Aortoiliac atheromatous   change.  RETROPERITONEUM/LYMPH NODES: Small volume nodes.  ABDOMINAL WALL: Postprocedural change at the umbilicus. New left   iliopsoas muscle calcification, image 158 series 2 since 10/6/2021.   Question prior injury / myositis ossificans.  BONES: Degenerative changes. Old left lateral rib fractures.    IMPRESSION:    Noncontrast study.    CHEST:  1. No large consolidation. Dependent scarring and minimal groundglass.  2. Few subcentimeter 2 mm nodules can be followed with dedicated chest CT   in 12 months based on patient risk factors.  3. Coronary artery calcification. Aortic root/valve calcification.    ABDOMEN/PELVIS:  1. Colonic diverticulosis, without diverticulitis. Given the history of   rectal bleeding, correlate with hemodynamics and hematocrit to guide   further management.    --- End of Report ---      JAZ OCONNOR M.D., ATTENDING RADIOLOGIST  This document has been electronically signed. Mar 30 2022 12:50PM          HEALTH ISSUES - PROBLEM Dx:  GIB (gastrointestinal bleeding)    Anemia    Atrial fibrillation    CAD (coronary artery disease)    Chronic CHF    Anxiety and depression    Stage 3 chronic kidney disease    Type 2 diabetes mellitus    HTN (hypertension)    HLD (hyperlipidemia)    Abnormal finding on lung imaging    DVT prophylaxis          Consultant(s) Notes Reviewed:  [ x ] YES     Care Discussed with [ x ] Consultants, [ x ] Patient, [ x ] Family, [  ] HCP, [  ] , [  ] Social Service, [ x ] RN, [  ] Physical Therapy, [  ] Palliative Care Team  DVT PPX: [  ] Lovenox, [  ] SC Heparin, [  ] Coumadin, [  ] Xarelto, [  ] Eliquis, [  ] Pradaxa, [  ] IV Heparin drip, [ x ] SCD, [  ] Ambulation, [  ] Contraindicated 2/2 GI Bleed, [  ] Contraindicated 2/2  Bleed, [  ] Contraindicated 2/2 Brain Bleed  Advanced Directive: [ x ] None, [  ] DNR/DNI Patient is a 77y old  Male who presents with a chief complaint of Rectal bleed (30 Mar 2022 15:40)      HPI:  76 yo M PMH of A fib (on Eliquis) , MGUS s/p PRBC transfusion 10/21, anxiety/depression, CAD s/p stents (not on plavix), BPH, CHF, Diverticulosis, HLD, HTN, thyroid nodules, CKD stage 3, DM2 presents to the ED from [facility] after having up to 3 bowel movements with dark/black stool. Patient would have another episode of dark stool in the ED. Patient denied consuming any beets or magnesium/calcium supplements recently. Patient states that onset of stool was very sudden. Patient recently admitted to [facility] in January for CHF exacerbation, improved w/ diuresis. Patient was seen and examined at bedside, denied any headache, cp/pressure, palpitations, myalgias, fevers/chills, SOB, hematuria, dysuria. Patient endorses lightheadedness/dizziness, 4/10 non-radiating sharp periumbilical pain, and chronic knee pain.     In ED:   Vitals: 97.5F, HR 69, 133/81, RR 18, 96% on RA   Labs significant for: Occult blood +, GFR 29, INR 2.01, Creatinine 2.3 (baseline 2.2)  Imaging: CT chest: Few 2mm nodules, coronary artery calcification  CT abd/pelvis: diverticulosis w/o diverticulitis   CT head: No acute intracranial hemorrhage or acute territorial infarct   EKG: Sinus rhythm w/ 1st degree heart block @ 60bpm, incomplete RBBB unchanged from prior ekg   Recieved: Pantoprazole infusion   (30 Mar 2022 15:40)      INTERVAL HPI:  3/31/2022: Patient had two more bowel movements with dark stool from yesterday. VSS. Patient seen and examined at bedside, denies any headache, cp/pressure/palpitations, abd pain, dysuria/hematuria, SOB, nausea/vomitting, fevers/chills. Patient continues to endorse lightheadedness/dizziness, and fatigue. AM hgb showed that patients hgb had dropped to 7.1 from 9.2, patient to receive 2 units of PRBC.          Home Medications:  Aristada 1064 mg/3.9 mL intramuscular suspension, extended release: 1 dose(s) intramuscular every 8 weeks (30 Mar 2022 15:58)  carvedilol 6.25 mg oral tablet: 1 tab(s) orally 2 times a day (30 Mar 2022 15:58)  cloNIDine 0.2 mg oral tablet: 1 tab(s) orally 3 times a day (30 Mar 2022 15:58)  diclofenac 1% topical gel: Apply topically to affected area 3 times a day (30 Mar 2022 15:58)  doxazosin 4 mg oral tablet: 1 tab(s) orally 2 times a day (30 Mar 2022 15:58)  furosemide 20 mg oral tablet: 3 tab(s) orally 2 times a day (30 Mar 2022 15:58)  lactobacillus acidophilus oral capsule: 1 cap(s) orally 2 times a day (30 Mar 2022 15:58)  lamoTRIgine 100 mg oral tablet: 1 tab(s) orally once a day (30 Mar 2022 15:58)  oxybutynin 15 mg/24 hr oral tablet, extended release: 1 tab(s) orally once a day (30 Mar 2022 15:58)  Vitamin B-12 1000 mcg oral tablet: 1 tab(s) orally once a day (30 Mar 2022 15:58)      MEDICATIONS  (STANDING):  amLODIPine   Tablet 10 milliGRAM(s) Oral daily  atorvastatin 10 milliGRAM(s) Oral at bedtime  budesonide 160 MICROgram(s)/formoterol 4.5 MICROgram(s) Inhaler 2 Puff(s) Inhalation two times a day  carvedilol 6.25 milliGRAM(s) Oral every 12 hours  cloNIDine 0.2 milliGRAM(s) Oral three times a day  cyanocobalamin 1000 MICROGram(s) Oral daily  dextrose 5%. 1000 milliLiter(s) (50 mL/Hr) IV Continuous <Continuous>  dextrose 5%. 1000 milliLiter(s) (100 mL/Hr) IV Continuous <Continuous>  dextrose 50% Injectable 25 Gram(s) IV Push once  dextrose 50% Injectable 12.5 Gram(s) IV Push once  dextrose 50% Injectable 25 Gram(s) IV Push once  doxazosin 4 milliGRAM(s) Oral <User Schedule>  folic acid 1 milliGRAM(s) Oral daily  furosemide    Tablet 60 milliGRAM(s) Oral two times a day  glucagon  Injectable 1 milliGRAM(s) IntraMuscular once  hydrALAZINE 100 milliGRAM(s) Oral three times a day  insulin lispro (ADMELOG) corrective regimen sliding scale   SubCutaneous three times a day before meals  insulin lispro (ADMELOG) corrective regimen sliding scale   SubCutaneous at bedtime  isosorbide   mononitrate ER Tablet (IMDUR) 90 milliGRAM(s) Oral daily  lamoTRIgine 100 milliGRAM(s) Oral daily  oxybutynin XL 15 milliGRAM(s) Oral daily  pantoprazole  Injectable 40 milliGRAM(s) IV Push daily  polyethylene glycol 3350 17 Gram(s) Oral daily  senna 2 Tablet(s) Oral at bedtime  venlafaxine XR. 150 milliGRAM(s) Oral daily    MEDICATIONS  (PRN):  acetaminophen     Tablet .. 650 milliGRAM(s) Oral every 6 hours PRN Temp greater or equal to 38C (100.4F), Mild Pain (1 - 3)  aluminum hydroxide/magnesium hydroxide/simethicone Suspension 30 milliLiter(s) Oral every 4 hours PRN Dyspepsia  dextrose Oral Gel 15 Gram(s) Oral once PRN Blood Glucose LESS THAN 70 milliGRAM(s)/deciliter  melatonin 3 milliGRAM(s) Oral at bedtime PRN Insomnia  ondansetron Injectable 4 milliGRAM(s) IV Push every 8 hours PRN Nausea and/or Vomiting      No Known Allergies      Social History:  Lives in [facility]   ADL independent   Ambulates w/ a walker   Former smoker, quit 45 years ago, ~8 pack years   Alcohol denies   Drugs denies   COVID Moderna x2 (30 Mar 2022 15:40)      REVIEW OF SYSTEMS:  CONSTITUTIONAL: No fever, No chills, [ x ] fatigue, No myalgia, No Body ache, No Weakness  EYES: No eye pain,  No visual disturbances, No discharge, No Redness  ENMT: No ear pain, No nose bleed, No vertigo; No sinus pain, No throat pain, No Congestion  NECK: No pain, No stiffness  RESPIRATORY: No cough, No wheezing, No hemoptysis, No shortness of breath  CARDIOVASCULAR: No chest pain, No palpitations  GASTROINTESTINAL: No abdominal pain, No epigastric pain. No nausea, No vomiting, No diarrhea, No constipation; [ x ] BM - dark stools  GENITOURINARY: No dysuria, No frequency, No urgency, No hematuria, No incontinence  NEUROLOGICAL: No headaches, [ x ] dizziness, No numbness, No tingling, No tremors, No weakness  EXTREMITIES: No Swelling, No Pain, No Edema  SKIN: [  ] No itching, burning, rashes, or lesions   MUSCULOSKELETAL: No joint pain, No joint swelling; No muscle pain, No back pain, No extremity pain  PSYCHIATRIC: No depression, No anxiety, No mood swings, No difficulty sleeping at night  PAIN SCALE: [ x ] None  [  ] Other-  ROS Unable to obtain due to: [  ] Dementia  [  ] Lethargy  [  ] Sedated  [  ] Non verbal  REST OF REVIEW OF SYSTEMS: [ x ] Normal     Vital Signs Last 24 Hrs  T(C): 36.5 (31 Mar 2022 05:05), Max: 36.9 (30 Mar 2022 21:20)  T(F): 97.7 (31 Mar 2022 05:05), Max: 98.5 (30 Mar 2022 21:20)  HR: 76 (31 Mar 2022 05:05) (63 - 78)  BP: 124/72 (31 Mar 2022 05:05) (124/72 - 160/82)  BP(mean): --  RR: 18 (31 Mar 2022 05:05) (16 - 18)  SpO2: 94% (31 Mar 2022 05:05) (94% - 99%)    CAPILLARY BLOOD GLUCOSE      POCT Blood Glucose.: 148 mg/dL (31 Mar 2022 07:24)  POCT Blood Glucose.: 122 mg/dL (30 Mar 2022 21:24)      I&O's Summary    PHYSICAL EXAM:  GENERAL:  [ x ] NAD, [  ] Well appearing, [  ] Agitated, [ x ] Drowsy, [  ] Lethargy, [  ] Confused   HEAD:  [ x ] Normal, [  ] Other  EYES:  [ x ] EOMI, [ x ] PERRLA, [  ] Conjunctiva and sclera clear normal, [ x ] Other - Right conjunctiva with lesion in LLQ, [ x ] Pallor, [  ] Discharge  ENMT:  [ x ] Normal, [ x ] Moist mucous membranes, [  ] Good dentition, [  ] No thrush  NECK:  [ x ] Supple, [ x ] No JVD, [ x ] Normal thyroid, [  ] Lymphadenopathy, [  ] Other  CHEST/LUNG:  [ x ] Clear to auscultation bilaterally, [ x ] Breath Sounds equal B/L, [  ] Poor effort, [ x ] No rales, [ x ] No rhonchi, [ x ] No wheezing  HEART:  [ x ] Regular rate and rhythm, [  ] Tachycardia, [  ] Bradycardia, [  ] Irregular, [ x ] 2/6 systolic murmur, No rubs, No gallops, [  ] PPM in place (Mfr:  )  ABDOMEN:  [ x ] Soft, [ x ] Nontender, [ x ] Nondistended, [ x ] No mass, [ x ] Bowel sounds present, [  ] Obese  NERVOUS SYSTEM:  [ x ] Alert & Oriented x3, [  ] Nonfocal, [  ] Confusion, [  ] Encephalopathic, [  ] Sedated, [  ] Unable to assess, [  ] Dementia, [  ] Other-  EXTREMITIES:  [ x ] 2+ Peripheral Pulses, No clubbing, No cyanosis,  [  ] Edema B/L lower EXT, [  ] PVD stasis skin changes B/L lower EXT, [  ] Wound  LYMPH:  No lymphadenopathy noted  SKIN:  [ x ] No rashes or lesions, [  ] Pressure ulcers, [  ] Ecchymosis, [  ] Skin tears, [  ] Other    DIET: Diet, Consistent Carbohydrate Clear Liquid (22 @ 16:58)      LABS:                        7.1    5.14  )-----------( 200      ( 31 Mar 2022 08:09 )             21.5     30 Mar 2022 10:49    139    |  106    |  48     ----------------------------<  127    3.8     |  25     |  2.30     Ca    9.6        30 Mar 2022 10:49    TPro  6.3    /  Alb  3.3    /  TBili  0.2    /  DBili  x      /  AST  14     /  ALT  15     /  AlkPhos  62     30 Mar 2022 10:49    PT/INR - ( 30 Mar 2022 10:49 )   PT: 23.7 sec;   INR: 2.01 ratio         PTT - ( 30 Mar 2022 10:49 )  PTT:41.9 sec  Urinalysis Basic - ( 31 Mar 2022 05:27 )    Color: Pale Yellow / Appearance: Clear / S.015 / pH: x  Gluc: x / Ketone: Negative  / Bili: Negative / Urobili: Negative   Blood: x / Protein: 30 mg/dL / Nitrite: Negative   Leuk Esterase: Trace / RBC: 0-2 /HPF / WBC 3-5   Sq Epi: x / Non Sq Epi: Occasional / Bacteria: x          RADIOLOGY & ADDITIONAL TESTS:  < from: CT Chest No Cont (22 @ 12:05) >    ACC: [id number] EXAM:  CT ABDOMEN AND PELVIS                        ACC: [id number] EXAM:  CT CHEST                          PROCEDURE DATE:  2022          INTERPRETATION:  CLINICAL INFORMATION: Rectal bleed and shortness of   breath    COMPARISON: CT chest 10/18/2021. CT chest abdomen pelvis 10/7/2021. Right   upper quadrant ultrasound 2021.    CONTRAST/COMPLICATIONS:  IV Contrast: NONE  Oral Contrast: NONE  Complications: None reported at time of study completion    PROCEDURE:  CT of the Chest, Abdomen and Pelvis was performed.  Sagittal and coronal reformats were performed.    FINDINGS:    CHEST:  LUNGS AND LARGE AIRWAYS: Central airways are patent. No focal   consolidation. Dependent scarring and minimal groundglass. Few scattered   2 mm pulmonary nodules. For reference, right upper lobe 2 mm nodule image   29 series 4, right lower lobe 2 mm nodule image 88 series 4, left upper   lobe 2 mm nodule image 26 series 4.  PLEURA: No pleural effusion or pneumothorax.  VESSELS: Atheromatous changes of the thoracic aorta. Main pulmonary   artery size is mildly enlarged, 3.2 cm.  HEART: Qualitatively, heart size is prominent. Aortic root/valve   calcification and coronary artery calcification. Trace pericardial fluid.  MEDIASTINUM AND SANDIP: No enlarged lymph nodes of the thorax. Small fluid   of the right infrahilar region near the right inferior pulmonary vein is   unchanged on multiple prior studies dating back to 2021, possibly a   pericardial cyst or recess. Esophagus is underdistended.  CHEST WALL AND LOWER NECK: No chest wall hematoma.    ABDOMEN AND PELVIS:    Solid organ evaluation limited due to noncontrast technique.    LIVER: Borderline enlarged liver measuring 18 cm in craniocaudal   dimension.  BILE DUCTS:No biliary distention  GALLBLADDER: Unremarkable CT appearance  SPLEEN: Normal size  PANCREAS: No main ductal dilatation  ADRENALS: Left adrenal gland thickening, similar to prior  KIDNEYS/URETERS: No hydronephrosis. Renal cysts, suboptimally   characterized due to lack of contrast.    BLADDER: Minimally distended.  REPRODUCTIVE ORGANS: Few coarse calcifications of the prostate    BOWEL: Stomach is underdistended. No small bowel distention. Normal   appendix. Mild stool burden of the colon limitsevaluation of the colonic   mucosa. Colonic diverticulosis, without evidence of acute diverticulitis.  PERITONEUM: No ascites.  VESSELS: No aneurysm of the abdominal aorta. Aortoiliac atheromatous   change.  RETROPERITONEUM/LYMPH NODES: Small volume nodes.  ABDOMINAL WALL: Postprocedural change at the umbilicus. New left   iliopsoas muscle calcification, image 158 series 2 since 10/6/2021.   Question prior injury / myositis ossificans.  BONES: Degenerative changes. Old left lateral rib fractures.    IMPRESSION:    Noncontrast study.    CHEST:  1. No large consolidation. Dependent scarring and minimal groundglass.  2. Few subcentimeter 2 mm nodules can be followed with dedicated chest CT   in 12 months based on patient risk factors.  3. Coronary artery calcification. Aortic root/valve calcification.    ABDOMEN/PELVIS:  1. Colonic diverticulosis, without diverticulitis. Given the history of   rectal bleeding, correlate with hemodynamics and hematocrit to guide   further management.    --- End of Report ---      JAZ OCONNOR M.D., ATTENDING RADIOLOGIST  This document has been electronically signed. Mar 30 2022 12:50PM          HEALTH ISSUES - PROBLEM Dx:  GIB (gastrointestinal bleeding)    Anemia    Atrial fibrillation    CAD (coronary artery disease)    Chronic CHF    Anxiety and depression    Stage 3 chronic kidney disease    Type 2 diabetes mellitus    HTN (hypertension)    HLD (hyperlipidemia)    Abnormal finding on lung imaging    DVT prophylaxis          Consultant(s) Notes Reviewed:  [ x ] YES     Care Discussed with [ x ] Consultants, [ x ] Patient, [ x ] Family, [  ] HCP, [  ] , [  ] Social Service, [ x ] RN, [  ] Physical Therapy, [  ] Palliative Care Team  DVT PPX: [  ] Lovenox, [  ] SC Heparin, [  ] Coumadin, [  ] Xarelto, [  ] Eliquis, [  ] Pradaxa, [  ] IV Heparin drip, [ x ] SCD, [  ] Ambulation, [  ] Contraindicated 2/2 GI Bleed, [  ] Contraindicated 2/2  Bleed, [  ] Contraindicated 2/2 Brain Bleed  Advanced Directive: [ x ] None, [  ] DNR/DNI Patient is a 77y old  Male who presents with a chief complaint of Rectal bleed (30 Mar 2022 15:40)      HPI:  76 yo M PMH of A fib (on Eliquis) , 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 Patient would have another episode of dark stool in the ED. Patient denied consuming any beets or magnesium/calcium supplements recently. Patient states that onset of stool was very sudden. Patient recently admitted to [facility] in January for CHF exacerbation, improved w/ diuresis. Patient was seen and examined at bedside, denied any headache, cp/pressure, palpitations, myalgias, fevers/chills, SOB, hematuria, dysuria. Patient endorses lightheadedness/dizziness, 4/10 non-radiating sharp periumbilical pain, and chronic knee pain.     In ED:   Vitals: 97.5F, HR 69, 133/81, RR 18, 96% on RA   Labs significant for: Occult blood +, GFR 29, INR 2.01, Creatinine 2.3 (baseline 2.2)  Imaging: CT chest: Few 2mm nodules, coronary artery calcification  CT abd/pelvis: diverticulosis w/o diverticulitis   CT head: No acute intracranial hemorrhage or acute territorial infarct   EKG: Sinus rhythm w/ 1st degree heart block @ 60bpm, incomplete RBBB unchanged from prior ekg   Recieved: Pantoprazole infusion   (30 Mar 2022 15:40)      INTERVAL HPI:  3/31/2022: Patient had two more bowel movements with dark stool from yesterday. VSS. Patient seen and examined at bedside, denies any headache, cp/pressure/palpitations, abd pain, dysuria/hematuria, SOB, nausea/vomitting, fevers/chills. Patient continues to endorse lightheadedness/dizziness, and fatigue. AM hgb showed that patients hgb had dropped to 7.1 from 9.2, patient to receive 2 units of PRBC.          Home Medications:  Aristada 1064 mg/3.9 mL intramuscular suspension, extended release: 1 dose(s) intramuscular every 8 weeks (30 Mar 2022 15:58)  carvedilol 6.25 mg oral tablet: 1 tab(s) orally 2 times a day (30 Mar 2022 15:58)  cloNIDine 0.2 mg oral tablet: 1 tab(s) orally 3 times a day (30 Mar 2022 15:58)  diclofenac 1% topical gel: Apply topically to affected area 3 times a day (30 Mar 2022 15:58)  doxazosin 4 mg oral tablet: 1 tab(s) orally 2 times a day (30 Mar 2022 15:58)  furosemide 20 mg oral tablet: 3 tab(s) orally 2 times a day (30 Mar 2022 15:58)  lactobacillus acidophilus oral capsule: 1 cap(s) orally 2 times a day (30 Mar 2022 15:58)  lamoTRIgine 100 mg oral tablet: 1 tab(s) orally once a day (30 Mar 2022 15:58)  oxybutynin 15 mg/24 hr oral tablet, extended release: 1 tab(s) orally once a day (30 Mar 2022 15:58)  Vitamin B-12 1000 mcg oral tablet: 1 tab(s) orally once a day (30 Mar 2022 15:58)      MEDICATIONS  (STANDING):  amLODIPine   Tablet 10 milliGRAM(s) Oral daily  atorvastatin 10 milliGRAM(s) Oral at bedtime  budesonide 160 MICROgram(s)/formoterol 4.5 MICROgram(s) Inhaler 2 Puff(s) Inhalation two times a day  carvedilol 6.25 milliGRAM(s) Oral every 12 hours  cloNIDine 0.2 milliGRAM(s) Oral three times a day  cyanocobalamin 1000 MICROGram(s) Oral daily  dextrose 5%. 1000 milliLiter(s) (50 mL/Hr) IV Continuous <Continuous>  dextrose 5%. 1000 milliLiter(s) (100 mL/Hr) IV Continuous <Continuous>  dextrose 50% Injectable 25 Gram(s) IV Push once  dextrose 50% Injectable 12.5 Gram(s) IV Push once  dextrose 50% Injectable 25 Gram(s) IV Push once  doxazosin 4 milliGRAM(s) Oral <User Schedule>  folic acid 1 milliGRAM(s) Oral daily  furosemide    Tablet 60 milliGRAM(s) Oral two times a day  glucagon  Injectable 1 milliGRAM(s) IntraMuscular once  hydrALAZINE 100 milliGRAM(s) Oral three times a day  insulin lispro (ADMELOG) corrective regimen sliding scale   SubCutaneous three times a day before meals  insulin lispro (ADMELOG) corrective regimen sliding scale   SubCutaneous at bedtime  isosorbide   mononitrate ER Tablet (IMDUR) 90 milliGRAM(s) Oral daily  lamoTRIgine 100 milliGRAM(s) Oral daily  oxybutynin XL 15 milliGRAM(s) Oral daily  pantoprazole  Injectable 40 milliGRAM(s) IV Push daily  polyethylene glycol 3350 17 Gram(s) Oral daily  senna 2 Tablet(s) Oral at bedtime  venlafaxine XR. 150 milliGRAM(s) Oral daily    MEDICATIONS  (PRN):  acetaminophen     Tablet .. 650 milliGRAM(s) Oral every 6 hours PRN Temp greater or equal to 38C (100.4F), Mild Pain (1 - 3)  aluminum hydroxide/magnesium hydroxide/simethicone Suspension 30 milliLiter(s) Oral every 4 hours PRN Dyspepsia  dextrose Oral Gel 15 Gram(s) Oral once PRN Blood Glucose LESS THAN 70 milliGRAM(s)/deciliter  melatonin 3 milliGRAM(s) Oral at bedtime PRN Insomnia  ondansetron Injectable 4 milliGRAM(s) IV Push every 8 hours PRN Nausea and/or Vomiting      No Known Allergies      Social History:  Lives in [facility]   ADL independent   Ambulates w/ a walker   Former smoker, quit 45 years ago, ~8 pack years   Alcohol denies   Drugs denies   COVID Moderna x2 (30 Mar 2022 15:40)      REVIEW OF SYSTEMS: i am OK  CONSTITUTIONAL: No fever, No chills, [ x ] fatigue, No myalgia, No Body ache, No Weakness  EYES: No eye pain,  No visual disturbances, No discharge, No Redness  ENMT: No ear pain, No nose bleed, No vertigo; No sinus pain, No throat pain, No Congestion  NECK: No pain, No stiffness  RESPIRATORY: No cough, No wheezing, No hemoptysis, No shortness of breath  CARDIOVASCULAR: No chest pain, No palpitations  GASTROINTESTINAL: No abdominal pain, No epigastric pain. No nausea, No vomiting, No diarrhea, No constipation; [ x ] BM - dark stools, 2 bloody BM  GENITOURINARY: No dysuria, No frequency, No urgency, No hematuria, No incontinence  NEUROLOGICAL: No headaches, [ x ] dizziness, No numbness, No tingling, No tremors, No weakness  EXTREMITIES: No Swelling, No Pain, No Edema  SKIN: [x  ] No itching, burning, rashes, or lesions   MUSCULOSKELETAL: No joint pain, No joint swelling; No muscle pain, No back pain, No extremity pain  PSYCHIATRIC: No depression, No anxiety, No mood swings, No difficulty sleeping at night  PAIN SCALE: [ x ] None  [  ] Other-  ROS Unable to obtain due to: [  ] Dementia  [  ] Lethargy  [  ] Sedated  [  ] Non verbal  REST OF REVIEW OF SYSTEMS: [ x ] Normal     Vital Signs Last 24 Hrs  T(C): 36.5 (31 Mar 2022 05:05), Max: 36.9 (30 Mar 2022 21:20)  T(F): 97.7 (31 Mar 2022 05:05), Max: 98.5 (30 Mar 2022 21:20)  HR: 76 (31 Mar 2022 05:05) (63 - 78)  BP: 124/72 (31 Mar 2022 05:05) (124/72 - 160/82)  BP(mean): --  RR: 18 (31 Mar 2022 05:05) (16 - 18)  SpO2: 94% (31 Mar 2022 05:05) (94% - 99%)    CAPILLARY BLOOD GLUCOSE      POCT Blood Glucose.: 148 mg/dL (31 Mar 2022 07:24)  POCT Blood Glucose.: 122 mg/dL (30 Mar 2022 21:24)      I&O's Summary    PHYSICAL EXAM:  GENERAL:  [ x ] NAD, [  ] Well appearing, [  ] Agitated, [ x ] Drowsy, [  ] Lethargy, [  ] Confused   HEAD:  [ x ] Normal, [  ] Other  EYES:  [ x ] EOMI, [ x ] PERRLA, [x  ] Conjunctiva and sclera clear normal, [ x ] Other - Right conjunctiva with lesion in LLQ, [ x ] Pallor, [  ] Discharge  ENMT:  [ x ] Normal, [ x ] Moist mucous membranes, [ x ] Good dentition, [x  ] No thrush  NECK:  [ x ] Supple, [ x ] No JVD, [ x ] Normal thyroid, [  ] Lymphadenopathy, [  ] Other  CHEST/LUNG:  [ x ] Clear to auscultation bilaterally, [ x ] Breath Sounds equal B/L, [  ] Poor effort, [ x ] No rales, [ x ] No rhonchi, [ x ] No wheezing  HEART:  [ x ] Regular rate and rhythm, [  ] Tachycardia, [  ] Bradycardia, [  ] Irregular, [ x ] 2/6 systolic murmur, No rubs, No gallops, [  ] PPM in place (Mfr:  )  ABDOMEN:  [ x ] Soft, [ x ] Nontender, [ x ] Nondistended, [ x ] No mass, [ x ] Bowel sounds present, [ x ] Obese  NERVOUS SYSTEM:  [ x ] Alert & Oriented x3, [ x ] Nonfocal, [  ] Confusion, [  ] Encephalopathic, [  ] Sedated, [  ] Unable to assess, [  ] Dementia, [  ] Other-  EXTREMITIES:  [ x ] 2+ Peripheral Pulses, No clubbing, No cyanosis,  [  ] Edema B/L lower EXT, [  ] PVD stasis skin changes B/L lower EXT, [  ] Wound  LYMPH:  No lymphadenopathy noted  SKIN:  [ x ] No rashes or lesions, [  ] Pressure ulcers, [  ] Ecchymosis, [  ] Skin tears, [  ] Other    DIET: Diet, Consistent Carbohydrate Clear Liquid (22 @ 16:58)      LABS:                        7.1    5.14  )-----------( 200      ( 31 Mar 2022 08:09 )             21.5     30 Mar 2022 10:49    139    |  106    |  48     ----------------------------<  127    3.8     |  25     |  2.30     Ca    9.6        30 Mar 2022 10:49    TPro  6.3    /  Alb  3.3    /  TBili  0.2    /  DBili  x      /  AST  14     /  ALT  15     /  AlkPhos  62     30 Mar 2022 10:49    PT/INR - ( 30 Mar 2022 10:49 )   PT: 23.7 sec;   INR: 2.01 ratio         PTT - ( 30 Mar 2022 10:49 )  PTT:41.9 sec  Urinalysis Basic - ( 31 Mar 2022 05:27 )    Color: Pale Yellow / Appearance: Clear / S.015 / pH: x  Gluc: x / Ketone: Negative  / Bili: Negative / Urobili: Negative   Blood: x / Protein: 30 mg/dL / Nitrite: Negative   Leuk Esterase: Trace / RBC: 0-2 /HPF / WBC 3-5   Sq Epi: x / Non Sq Epi: Occasional / Bacteria: x          RADIOLOGY & ADDITIONAL TESTS:  < from: CT Chest No Cont (22 @ 12:05) >    ACC: [id number] EXAM:  CT ABDOMEN AND PELVIS                        ACC: [id number] EXAM:  CT CHEST                          PROCEDURE DATE:  2022          INTERPRETATION:  CLINICAL INFORMATION: Rectal bleed and shortness of   breath    COMPARISON: CT chest 10/18/2021. CT chest abdomen pelvis 10/7/2021. Right   upper quadrant ultrasound 2021.    CONTRAST/COMPLICATIONS:  IV Contrast: NONE  Oral Contrast: NONE  Complications: None reported at time of study completion    PROCEDURE:  CT of the Chest, Abdomen and Pelvis was performed.  Sagittal and coronal reformats were performed.    FINDINGS:    CHEST:  LUNGS AND LARGE AIRWAYS: Central airways are patent. No focal   consolidation. Dependent scarring and minimal groundglass. Few scattered   2 mm pulmonary nodules. For reference, right upper lobe 2 mm nodule image   29 series 4, right lower lobe 2 mm nodule image 88 series 4, left upper   lobe 2 mm nodule image 26 series 4.  PLEURA: No pleural effusion or pneumothorax.  VESSELS: Atheromatous changes of the thoracic aorta. Main pulmonary   artery size is mildly enlarged, 3.2 cm.  HEART: Qualitatively, heart size is prominent. Aortic root/valve   calcification and coronary artery calcification. Trace pericardial fluid.  MEDIASTINUM AND SANDIP: No enlarged lymph nodes of the thorax. Small fluid   of the right infrahilar region near the right inferior pulmonary vein is   unchanged on multiple prior studies dating back to 2021, possibly a   pericardial cyst or recess. Esophagus is underdistended.  CHEST WALL AND LOWER NECK: No chest wall hematoma.    ABDOMEN AND PELVIS:    Solid organ evaluation limited due to noncontrast technique.    LIVER: Borderline enlarged liver measuring 18 cm in craniocaudal   dimension.  BILE DUCTS:No biliary distention  GALLBLADDER: Unremarkable CT appearance  SPLEEN: Normal size  PANCREAS: No main ductal dilatation  ADRENALS: Left adrenal gland thickening, similar to prior  KIDNEYS/URETERS: No hydronephrosis. Renal cysts, suboptimally   characterized due to lack of contrast.    BLADDER: Minimally distended.  REPRODUCTIVE ORGANS: Few coarse calcifications of the prostate    BOWEL: Stomach is underdistended. No small bowel distention. Normal   appendix. Mild stool burden of the colon limitsevaluation of the colonic   mucosa. Colonic diverticulosis, without evidence of acute diverticulitis.  PERITONEUM: No ascites.  VESSELS: No aneurysm of the abdominal aorta. Aortoiliac atheromatous   change.  RETROPERITONEUM/LYMPH NODES: Small volume nodes.  ABDOMINAL WALL: Postprocedural change at the umbilicus. New left   iliopsoas muscle calcification, image 158 series 2 since 10/6/2021.   Question prior injury / myositis ossificans.  BONES: Degenerative changes. Old left lateral rib fractures.    IMPRESSION:    Noncontrast study.    CHEST:  1. No large consolidation. Dependent scarring and minimal groundglass.  2. Few subcentimeter 2 mm nodules can be followed with dedicated chest CT   in 12 months based on patient risk factors.  3. Coronary artery calcification. Aortic root/valve calcification.    ABDOMEN/PELVIS:  1. Colonic diverticulosis, without diverticulitis. Given the history of   rectal bleeding, correlate with hemodynamics and hematocrit to guide   further management.    --- End of Report ---      JAZ OCONNOR M.D., ATTENDING RADIOLOGIST  This document has been electronically signed. Mar 30 2022 12:50PM          HEALTH ISSUES - PROBLEM Dx:  GIB (gastrointestinal bleeding)    Anemia    Atrial fibrillation    CAD (coronary artery disease)    Chronic CHF    Anxiety and depression    Stage 3 chronic kidney disease    Type 2 diabetes mellitus    HTN (hypertension)    HLD (hyperlipidemia)    Abnormal finding on lung imaging    DVT prophylaxis          Consultant(s) Notes Reviewed:  [ x ] YES     Care Discussed with [ x ] Consultants, [ x ] Patient, [ x ] Family, [  ] HCP, [  ] , [  ] Social Service, [ x ] RN, [  ] Physical Therapy, [  ] Palliative Care Team  DVT PPX: [  ] Lovenox, [  ] SC Heparin, [  ] Coumadin, [  ] Xarelto, [  ] Eliquis, [  ] Pradaxa, [  ] IV Heparin drip, [ x ] SCD, [  ] Ambulation, [  ] Contraindicated 2/2 GI Bleed, [  ] Contraindicated 2/2  Bleed, [  ] Contraindicated 2/2 Brain Bleed  Advanced Directive: [ x ] None, [  ] DNR/DNI

## 2022-03-31 NOTE — PROGRESS NOTE ADULT - PROBLEM SELECTOR PLAN 7
Chronic, stable   - Patient's creatinine 2.3, near baseline of 2.2   - Patients GFR 29, near baseline   - avoid nephrotoxic medications as much as possible Chronic, stable   - Patient's creatinine 2.3, near baseline of 2.2   - Patients GFR 29, near baseline   - avoid nephrotoxic medications as much as possible  - Nephro Cayden consulted

## 2022-03-31 NOTE — DISCHARGE NOTE PROVIDER - NSDCACTIVITY_GEN_ALL_CORE
No restrictions No heavy lifting/straining Bathing allowed/Do not drive or operate machinery/No heavy lifting/straining

## 2022-03-31 NOTE — DISCHARGE NOTE PROVIDER - CARE PROVIDERS DIRECT ADDRESSES
,DirectAddress_Unknown,DirectAddress_Unknown,DirectAddress_Unknown,[email or web address]

## 2022-03-31 NOTE — DISCHARGE NOTE PROVIDER - NSDCMRMEDTOKEN_GEN_ALL_CORE_FT
amLODIPine 10 mg oral tablet: 1 tab(s) orally once a day  apixaban 5 mg oral tablet: 1 tab(s) orally every 12 hours  Aristada 1064 mg/3.9 mL intramuscular suspension, extended release: 1 dose(s) intramuscular every 8 weeks  aspirin 81 mg oral delayed release tablet: 1 tab(s) orally once a day  atorvastatin 10 mg oral tablet: 1 tab(s) orally once a day (at bedtime)  budesonide-formoterol 160 mcg-4.5 mcg/inh inhalation aerosol: 2 puff(s) inhaled 2 times a day   carvedilol 6.25 mg oral tablet: 1 tab(s) orally 2 times a day  cloNIDine 0.2 mg oral tablet: 1 tab(s) orally 3 times a day  diclofenac 1% topical gel: Apply topically to affected area 3 times a day  doxazosin 4 mg oral tablet: 1 tab(s) orally 2 times a day  famotidine 20 mg oral tablet: 1 tab(s) orally once a day  folic acid 1 mg oral tablet: 1 tab(s) orally once a day  furosemide 20 mg oral tablet: 3 tab(s) orally 2 times a day  hydrALAZINE 100 mg oral tablet: 1 tab(s) orally 3 times a day  isosorbide mononitrate 30 mg oral tablet, extended release: 3 tab(s) orally once a day  Total 90 mg daily by mouth  lactobacillus acidophilus oral capsule: 1 cap(s) orally 2 times a day  lamoTRIgine 100 mg oral tablet: 1 tab(s) orally once a day  metFORMIN 500 mg oral tablet: 1 tab(s) orally 2 times a day   oxybutynin 15 mg/24 hr oral tablet, extended release: 1 tab(s) orally once a day  venlafaxine 150 mg oral capsule, extended release: 1 cap(s) orally once a day  Vitamin B-12 1000 mcg oral tablet: 1 tab(s) orally once a day   amLODIPine 10 mg oral tablet: 1 tab(s) orally once a day  Aristada 1064 mg/3.9 mL intramuscular suspension, extended release: 1 dose(s) intramuscular every 8 weeks  aspirin 81 mg oral delayed release tablet: 1 tab(s) orally once a day  atorvastatin 10 mg oral tablet: 1 tab(s) orally once a day (at bedtime)  budesonide-formoterol 160 mcg-4.5 mcg/inh inhalation aerosol: 2 puff(s) inhaled 2 times a day   carvedilol 6.25 mg oral tablet: 1 tab(s) orally 2 times a day  cloNIDine 0.2 mg oral tablet: 1 tab(s) orally 3 times a day  diclofenac 1% topical gel: Apply topically to affected area 3 times a day  doxazosin 4 mg oral tablet: 1 tab(s) orally 2 times a day  Eliquis 5 mg oral tablet: 1 tab(s) orally 2 times a day STARTING ON APRIL 8th. Do not resume this medication until April 8th.  famotidine 20 mg oral tablet: 1 tab(s) orally once a day  folic acid 1 mg oral tablet: 1 tab(s) orally once a day  furosemide 20 mg oral tablet: 3 tab(s) orally 2 times a day  hydrALAZINE 100 mg oral tablet: 1 tab(s) orally 3 times a day  isosorbide mononitrate 30 mg oral tablet, extended release: 3 tab(s) orally once a day  Total 90 mg daily by mouth  lactobacillus acidophilus oral capsule: 1 cap(s) orally 2 times a day  lamoTRIgine 100 mg oral tablet: 1 tab(s) orally once a day  metFORMIN 500 mg oral tablet: 1 tab(s) orally 2 times a day   oxybutynin 15 mg/24 hr oral tablet, extended release: 1 tab(s) orally once a day  venlafaxine 150 mg oral capsule, extended release: 1 cap(s) orally once a day  Vitamin B-12 1000 mcg oral tablet: 1 tab(s) orally once a day   amLODIPine 10 mg oral tablet: 1 tab(s) orally once a day  Aristada 1064 mg/3.9 mL intramuscular suspension, extended release: 1 dose(s) intramuscular every 8 weeks  atorvastatin 10 mg oral tablet: 1 tab(s) orally once a day (at bedtime)  budesonide-formoterol 160 mcg-4.5 mcg/inh inhalation aerosol: 2 puff(s) inhaled 2 times a day   carvedilol 6.25 mg oral tablet: 1 tab(s) orally 2 times a day hold for SBP&lt;110, HR &lt; 60  cloNIDine 0.2 mg oral tablet: 1 tab(s) orally 3 times a day HOLD for SBP &lt; 110  diclofenac 1% topical gel: Apply topically to affected area 3 times a day  doxazosin 4 mg oral tablet: 1 tab(s) orally 2 times a day  famotidine 20 mg oral tablet: 1 tab(s) orally once a day  folic acid 1 mg oral tablet: 1 tab(s) orally once a day  furosemide 20 mg oral tablet: 3 tab(s) orally 2 times a day  hydrALAZINE 100 mg oral tablet: 1 tab(s) orally 3 times a day  isosorbide mononitrate 30 mg oral tablet, extended release: 3 tab(s) orally once a day  Total 90 mg daily by mouth  lactobacillus acidophilus oral capsule: 1 cap(s) orally 2 times a day  lamoTRIgine 100 mg oral tablet: 1 tab(s) orally once a day  oxybutynin 15 mg/24 hr oral tablet, extended release: 1 tab(s) orally once a day  venlafaxine 150 mg oral capsule, extended release: 1 cap(s) orally once a day  Vitamin B-12 1000 mcg oral tablet: 1 tab(s) orally once a day

## 2022-03-31 NOTE — DISCHARGE NOTE PROVIDER - NSDCFUADDINST_GEN_ALL_CORE_FT
Transfer to [facility] for Possible IR embolization / Capsule endoscopy with Pushed enteroscopy with positive Bleeding scan

## 2022-03-31 NOTE — DISCHARGE NOTE PROVIDER - NSDCCPCAREPLAN_GEN_ALL_CORE_FT
PRINCIPAL DISCHARGE DIAGNOSIS  Diagnosis: Rectal bleed  Assessment and Plan of Treatment: You were admitted for rectal bleeding, a form of GI (gastrointestinal) bleed. You required multiple blood transfusions due to low hemoglobin levels. You received a colonoscopy on 4/1/22 which revealed old blood but no active sources of bleeding. Please follow up with GI outpatient and your PCP.  Please return to the emergency department if you experience fever, chills, chest pain, shortness of breath, rectal bleeding, or any symptom you feel requires evaluation by a physician.      SECONDARY DISCHARGE DIAGNOSES  Diagnosis: CAD (coronary artery disease)  Assessment and Plan of Treatment: You were previously diagnosed with coronary artery disease. This is a narrowing of the arteries in your heart. Please continue to take your medications as prescribed and follow up with your PCP and cardiologist for further monitoring.   Your aspirin was held while you were in the hospital due to bleeding. Please **************resume/stop taking************** this medication.    Diagnosis: Atrial fibrillation  Assessment and Plan of Treatment: Atrial fibrillation is a condition where the different parts of the heart do not beat in time with each other. These changes can lead to serious issues, including clots that form in the heart and changes in the heart rate and rhythm where your heart might beat too fast. It is important that you take your prescribed medications as instructed to avoid clots forming and to make sure your heart does not beat too fast. It is also very important that you follow up with your outpatient cardiologist after discharge within a week to make sure your medications are working effectively.   You home eliquis was held while you were in the hospital due to bleeding. Please **************resume/stop taking************** this medication.    Diagnosis: Abnormal finding on lung imaging  Assessment and Plan of Treatment: You had a CT scan of your chest that revealed three small nodules in your lung (2mm size, in the right upper lobe, right lower lobe, and left upper lobe). Please follow up with your PCP to have a repeat CT scan of your chest in 12 months.    Diagnosis: Chronic CHF  Assessment and Plan of Treatment: You have some degree of congestive heart failure. This means that your heart has difficulty pumping blood at a normal level. Please continue to take your medications as prescribed and follow up with your PCP and cardiologist for further management. Return to the ED immediately or call your doctor if you develop worsening shortness of breath, chest pain or swelling of your legs.   Please continue taking your home medications and follow up with your cardiologist.    Diagnosis: Stage 3 chronic kidney disease  Assessment and Plan of Treatment: You have chronic kidney disease. The nephrologist evaluated you in the hospital. Please follow up with your outpatient nephrologist.     PRINCIPAL DISCHARGE DIAGNOSIS  Diagnosis: Rectal bleed  Assessment and Plan of Treatment: You were admitted for rectal bleeding, a form of GI (gastrointestinal) bleed.  Gastroenterology, Cardiology, Hematology  evaluated you during your hospitalization. You required multiple blood transfusions due to low hemoglobin levels. You received a colonoscopy on 4/1/22 which revealed old blood but no active sources of bleeding.   - Please follow up with your Gastroenterologist Dr. Feldman and our PCP within3-5 week of discharge.   - Your home Aspirin was held but your hemoglobin improved and remained stable, you may CONTINUE back on your Aspirin.   - Your home Eliquis was held due to your bleed, please DO NOT RESTART your home  Eliquis until FRIDAY 4/8/22.   - Please return to the emergency department if you experience fever, chills, chest pain, shortness of breath, rectal bleeding, or any symptom you feel requires evaluation by a physician.      SECONDARY DISCHARGE DIAGNOSES  Diagnosis: CAD (coronary artery disease)  Assessment and Plan of Treatment: You were previously diagnosed with coronary artery disease. This is a narrowing of the arteries in your heart.   - Your home Aspirin was held but your hemoglobin improved and remained stable, you may CONTINUE back on your Aspirin.   - Continue your home statin.   - Please follow up with your PCP and Cardiologist for further monitoring.    Diagnosis: Atrial fibrillation  Assessment and Plan of Treatment: You were previously diagnosed with atrial fibrillation is a condition where the different parts of the heart do not beat in time with each other.   - It is important that you take your prescribed medications as instructed to avoid clots forming and to make sure your heart does not beat too fast.  - Your home Aspirin was held but your hemoglobin improved and remained stable, you may CONTINUE back on your Aspirin.   - Your home Eliquis was held due to your bleed, please DO NOT RESTART your home  Eliquis until FRIDAY 4/8/22.   - Please follow up with your Cardiologist Dr. Sheridan within 3-5 days of discharge.    Diagnosis: Chronic CHF  Assessment and Plan of Treatment: You were previously diagnosed with congestive heart failure. This means that your heart has difficulty pumping blood at a normal level.   - Please continue to take your home Lasix, Imdur and Carvedilol  as prescribed  - Your potassium level was low due to your high dose of Lasix, you were started on Potassium supplements. Please CONTINUE Potassium ______  - Please follow up with your PCP and Cardiologist for further management.   - Return to the ED immediately or call your doctor if you develop worsening shortness of breath, chest pain or swelling of your legs.    Diagnosis: Stage 3 chronic kidney disease  Assessment and Plan of Treatment: You have chronic kidney disease. The nephrologist evaluated you in the hospital.   - Please follow up with your outpatient nephrologist.    Diagnosis: Abnormal finding on lung imaging  Assessment and Plan of Treatment: You had a CT scan of your chest that revealed three small nodules in your lung (2mm size, in the right upper lobe, right lower lobe, and left upper lobe).   - Please follow up with your PCP to have a repeat CT scan of your chest in 12 months.    Diagnosis: Hyperlipidemia  Assessment and Plan of Treatment: You were previously diagnosed with hyperlipidemia.   - Please continue your home statin    Diagnosis: DM (diabetes mellitus)  Assessment and Plan of Treatment: You were previously diagnosed diabetes  - Please continue your home Metformin    Diagnosis: HTN (hypertension)  Assessment and Plan of Treatment: You were previously diagnosed with high blood pressure  - Please continue home Amlodipine, Clonidine, Hydralazine and Imdur   - Please follow up with your Cardiologist after discharge    Diagnosis: Anxiety and depression  Assessment and Plan of Treatment: You were previously diagnosed with anxiety   - Please continue your home Venlafaxine    Diagnosis: Anxiety  Assessment and Plan of Treatment:      PRINCIPAL DISCHARGE DIAGNOSIS  Diagnosis: Rectal bleed  Assessment and Plan of Treatment: You were admitted for rectal bleeding, a form of GI (gastrointestinal) bleed.  Gastroenterology, Cardiology, Hematology  evaluated you during your hospitalization. You required multiple blood transfusions due to low hemoglobin levels.  - You received a colonoscopy on 4/1/22 which revealed old blood but no active sources of bleeding.   . -Bleeding Scan showed + active bleeding from small Bowel   IMPRESSION: Abnormal GI bleeding scan.  I is compatible with small bowel bleed, probably jejunal.  - STOP  Aspirin  & STOP Eliquis    - Your home Eliquis was held due to your bleed, please DO NOT RESTART your home  Eliquis until FRIDAY 4/8/22.   - NPO, CBC serial check,   -Transfer to [facility] for Possible IR embolization/ Capsule endoscopy with Push enteroscopy      SECONDARY DISCHARGE DIAGNOSES  Diagnosis: Anemia due to acute blood loss  Assessment and Plan of Treatment: acute on chronic Anemia 2/2 active GI Bleed, Likely Brisk uper GI bleed   Positive Bleeding Scan -Small Bowel Bleed   -Total 5 u pRBC  transfusion   HOLD ASA, Eliquis, serial CBC monitoring    Diagnosis: CAD (coronary artery disease)  Assessment and Plan of Treatment: You were previously diagnosed with coronary artery disease & Stent - Your home Aspirin is on HOLD for GI Bleed  - Continue your home statin.   - Cardiology-Dr Sheridan .    Diagnosis: HTN (hypertension)  Assessment and Plan of Treatment: You were previously diagnosed with high blood pressure  - Please continue home Amlodipine, Clonidine, Hydralazine and Imdur , Lassix , Coreg .with Hold parameters  - Please follow up with your Cardiologist after discharge    Diagnosis: Chronic CHF  Assessment and Plan of Treatment: You were previously diagnosed with congestive heart failure. This means that your heart has difficulty pumping blood at a normal level.   - Please continue to take your home Lasix, Imdur and Carvedilol  as prescribed  - Your potassium level was low due to your high dose of Lasix, you were started on Potassium supplements. Please CONTINUE Potassium as needed.   Cardiology -Dr Sheridan    Diagnosis: Atrial fibrillation  Assessment and Plan of Treatment: You have H/O   atrial fibrillation  , HR stable   - Your home Aspirin & Eliquis on HOLD  2/2 GI Bleed  - Your home Eliquis was on HOLD 2/2 GI Bleed  - Please follow up with your Cardiologist Dr. Sheridan    Diagnosis: Stage 3 chronic kidney disease  Assessment and Plan of Treatment: You have chronic kidney disease. The nephrologist evaluated you in the hospital.  Cr is stable, CKD 3, serial BMP   - Please follow up with your outpatient nephrologist.    Diagnosis: DM (diabetes mellitus)  Assessment and Plan of Treatment: You were previously diagnosed diabetes, FS Q 6 hrs with Insulin correction scale while NPO  - home Meds  Metformin on HOLD    Diagnosis: Abnormal finding on lung imaging  Assessment and Plan of Treatment: You had a CT scan of your chest that revealed three small nodules in your lung (2mm size, in the right upper lobe, right lower lobe, and left upper lobe).   - Please follow up with your PCP to have a repeat CT scan of your chest in 12 months.    Diagnosis: Hyperlipidemia  Assessment and Plan of Treatment: You were previously diagnosed with hyperlipidemia.   - Please continue your home statin    Diagnosis: Anxiety and depression  Assessment and Plan of Treatment: You were previously diagnosed with anxiety   - Please continue your home Venlafaxine    Diagnosis: Anxiety  Assessment and Plan of Treatment: On Venalafaxine 150 mg daily, Lomtrigine 100 mg daily  Psych follow up

## 2022-03-31 NOTE — DISCHARGE NOTE PROVIDER - NSDCFUADDAPPT_GEN_ALL_CORE_FT
- Please make an appointment for follow up with your primary doctor in 1 week of discharge  - Please make an appointment for follow up with Cardio, GI (information above)  - Patient or caretaker is responsible for making all appointments  - Please return to the ED if you develop worsening symptoms or fever

## 2022-03-31 NOTE — DISCHARGE NOTE PROVIDER - HOSPITAL COURSE
ADMISSION DATE:  03-30-22    ---  FROM ADMISSION H+P:   HPI:  76 yo M PMH of A fib (on Eliquis) , MGUS s/p PRBC transfusion 10/21, anxiety/depression, CAD s/p stents (not on plavix), BPH, CHF, Diverticulosis, HLD, HTN, thyroid nodules, CKD stage 3, DM2 presents to the ED from nursing home after having up to 3 bowel movements with dark/black stool. Patient would have another episode of dark stool in the ED. Patient denied consuming any beets or magnesium/calcium supplements recently. Patient states that onset of stool was very sudden. Patient recently admitted to [facility] in January for CHF exacerbation, improved w/ diuresis. Patient was seen and examined at bedside, denied any headache, cp/pressure, palpitations, myalgias, fevers/chills, SOB, hematuria, dysuria. Patient endorses lightheadedness/dizziness, 4/10 non-radiating sharp periumbilical pain, and chronic knee pain.     In ED:   Vitals: 97.5F, HR 69, 133/81, RR 18, 96% on RA   Labs significant for: Occult blood +, GFR 29, INR 2.01, Creatinine 2.3 (baseline 2.2)  Imaging: CT chest: Few 2mm nodules, coronary artery calcification  CT abd/pelvis: diverticulosis w/o diverticulitis   CT head: No acute intracranial hemorrhage or acute territorial infarct   EKG: Sinus rhythm w/ 1st degree heart block @ 60bpm, incomplete RBBB unchanged from prior ekg   Recieved: Pantoprazole infusion   (30 Mar 2022 15:40)      ---  HOSPITAL COURSE/PERTINENT LABS/PROCEDURES PERFORMED/PENDING TESTS:   Pt was admitted for       Patient is stable for discharge as per primary medical team and consultants.    PT consulted, recommends discharge ______    Patient showed improvement throughout hospitalization. Patient was seen and examined on day of discharge. Patient was medically optimized for discharge with close outpatient follow up.    ---  PATIENT CONDITION:  - stable    --  VITALS:   T(C): 36.5 (03-31-22 @ 05:05), Max: 36.9 (03-30-22 @ 21:20)  HR: 76 (03-31-22 @ 05:05) (63 - 78)  BP: 124/72 (03-31-22 @ 05:05) (124/72 - 160/82)  RR: 18 (03-31-22 @ 05:05) (16 - 18)  SpO2: 94% (03-31-22 @ 05:05) (94% - 99%)    PHYSICAL EXAM ON DAY OF DISCHARGE:    ---  CONSULTANTS:   -    ---  ADVANCED CARE PLANNING:  - Code status:      - [facility] completed:      [  ] NO     [  ] YES    ---  TIME SPENT:  I, the attending physician, was physically present for the key portions of the evaluation and management (E/M) service provided. The total amount of time spent reviewing the hospital notes, laboratory values, imaging findings, assessing/counseling the patient, discussing with consultant physicians, social work, nursing staff was -- minutes       ADMISSION DATE:  03-30-22    ---  FROM ADMISSION H+P:   HPI:  76 yo M PMH of A fib (on Eliquis) , MGUS s/p PRBC transfusion 10/21, anxiety/depression, CAD s/p stents (not on plavix), BPH, CHF, Diverticulosis, HLD, HTN, thyroid nodules, CKD stage 3, DM2 presents to the ED from [facility] after having up to 3 bowel movements with dark/black stool. Patient would have another episode of dark stool in the ED. Patient denied consuming any beets or magnesium/calcium supplements recently. Patient states that onset of stool was very sudden. Patient recently admitted to [facility] in January for CHF exacerbation, improved w/ diuresis. Patient was seen and examined at bedside, denied any headache, cp/pressure, palpitations, myalgias, fevers/chills, SOB, hematuria, dysuria. Patient endorses lightheadedness/dizziness, 4/10 non-radiating sharp periumbilical pain, and chronic knee pain.     In ED:   Vitals: 97.5F, HR 69, 133/81, RR 18, 96% on RA   Labs significant for: Occult blood +, GFR 29, INR 2.01, Creatinine 2.3 (baseline 2.2)  Imaging: CT chest: Few 2mm nodules, coronary artery calcification  CT abd/pelvis: diverticulosis w/o diverticulitis   CT head: No acute intracranial hemorrhage or acute territorial infarct   EKG: Sinus rhythm w/ 1st degree heart block @ 60bpm, incomplete RBBB unchanged from prior ekg   Recieved: Pantoprazole infusion   (30 Mar 2022 15:40)      ---  HOSPITAL COURSE/PERTINENT LABS/PROCEDURES PERFORMED/PENDING TESTS:   Pt was admitted for GI bleed on 3/30/22. Pt received multiple prbc transfusions and had close monitoring of hemoglobin levels. Colonoscopy on 4/1 revealed old blood in L colon, multiple diverticuli from proximal transverse to sigmoid colon, no active bleeding was noted. Poor prep did not allow for evaluation of possible small lesions or polyps. Aspirin and eliquis were held. Pt was placed on clear liquid diet, diet was advanced to full liquid diet on 4/2. Cardiology was consulted for pre-procedure optimization. 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 Hematology was consulted. Pt was treated with home meds for chronic conditions - afib (eliquis held), CAD (statin), CHF (lasix), HTN (antihypertensives), anxiety and depression, T2DM (insulin sliding scale). Nephrology was consulted for CKD.      Patient is stable for discharge as per primary medical team and consultants.    PT consulted, recommends discharge ______    Patient showed improvement throughout hospitalization. Patient was seen and examined on day of discharge. Patient was medically optimized for discharge with close outpatient follow up.    ---  PATIENT CONDITION:  - stable    --  VITALS:   T(C): 36.5 (03-31-22 @ 05:05), Max: 36.9 (03-30-22 @ 21:20)  HR: 76 (03-31-22 @ 05:05) (63 - 78)  BP: 124/72 (03-31-22 @ 05:05) (124/72 - 160/82)  RR: 18 (03-31-22 @ 05:05) (16 - 18)  SpO2: 94% (03-31-22 @ 05:05) (94% - 99%)    PHYSICAL EXAM ON DAY OF DISCHARGE:    ---  CONSULTANTS:   - Heme - Dr. Rodriguez  - GI - Dr. Bah  - Cardio - Dr. Posey  - Nephro - Dr. Rodarte    ---  ADVANCED CARE PLANNING:  - Code status:      - [facility] completed:      [  ] NO     [  ] YES    ---  TIME SPENT:  I, the attending physician, was physically present for the key portions of the evaluation and management (E/M) service provided. The total amount of time spent reviewing the hospital notes, laboratory values, imaging findings, assessing/counseling the patient, discussing with consultant physicians, social work, nursing staff was -- minutes       ADMISSION DATE:  03-30-22    ---  FROM ADMISSION H+P:   HPI:  76 yo M PMH of A fib (on Eliquis) , MGUS s/p PRBC transfusion 10/21, anxiety/depression, CAD s/p stents (not on plavix), BPH, CHF, Diverticulosis, HLD, HTN, thyroid nodules, CKD stage 3, DM2 [facility] after having up to 3 bowel movements with dark/black stool. Patient would have another episode of dark stool in the ED. Patient denied consuming any beets or magnesium/calcium supplements recently. Patient states that onset of stool was very sudden. Patient recently admitted to [facility] in January for CHF exacerbation, improved w/ diuresis. Patient was seen and examined at bedside, denied any headache, cp/pressure, palpitations, myalgias, fevers/chills, SOB, hematuria, dysuria. Patient endorses lightheadedness/dizziness, 4/10 non-radiating sharp periumbilical pain, and chronic knee pain.     In ED:   Vitals: 97.5F, HR 69, 133/81, RR 18, 96% on RA   Labs significant for: Occult blood +, GFR 29, INR 2.01, Creatinine 2.3 (baseline 2.2)  Imaging: CT chest: Few 2mm nodules, coronary artery calcification  CT abd/pelvis: diverticulosis w/o diverticulitis   CT head: No acute intracranial hemorrhage or acute territorial infarct   EKG: Sinus rhythm w/ 1st degree heart block @ 60bpm, incomplete RBBB unchanged from prior ekg   Recieved: Pantoprazole infusion   (30 Mar 2022 15:40)      ---  HOSPITAL COURSE/PERTINENT LABS/PROCEDURES PERFORMED/PENDING TESTS:   Pt was admitted for GI bleed on 3/30/22. Pt received multiple prbc transfusions and had close monitoring of hemoglobin levels. Colonoscopy on 4/1 revealed old blood in L colon, multiple diverticuli from proximal transverse to sigmoid colon, no active bleeding was noted. Poor prep did not allow for evaluation of possible small lesions or polyps. Aspirin and eliquis were held. Pt was placed on clear liquid diet, diet was advanced to full liquid diet on 4/2 and advanced to regular diet on ___. Cardiology was consulted for pre-procedure optimization. 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 Hematology was consulted. Pt was treated with home meds for chronic conditions - afib (eliquis held), CAD (statin), CHF (lasix), HTN (antihypertensives), anxiety and depression, T2DM (insulin sliding scale). Nephrology was consulted for CKD. Hb was stable upon discharge.      Patient is stable for discharge as per primary medical team and consultants.    PT consulted, recommends discharge home - no PT needs.    Patient showed improvement throughout hospitalization. Patient was seen and examined on day of discharge. Patient was medically optimized for discharge with close outpatient follow up.    ---  PATIENT CONDITION:  - stable    --  Vital Signs Last 24 Hrs  T(C): 36.4 (03 Apr 2022 05:01), Max: 37.1 (02 Apr 2022 20:42)  T(F): 97.6 (03 Apr 2022 05:01), Max: 98.8 (02 Apr 2022 20:42)  HR: 65 (03 Apr 2022 05:01) (61 - 78)  BP: 165/82 (03 Apr 2022 05:01) (106/65 - 165/82)  BP(mean): --  RR: 18 (03 Apr 2022 05:01) (17 - 18)  SpO2: 92% (03 Apr 2022 05:01) (92% - 95%)    ---  CONSULTANTS:   - Heme - Dr. Rodriguez  - GI - Dr. Bah  - Cardio - Dr. Posey  - Nephro - Dr. Rodarte    ---  ADVANCED CARE PLANNING:  - Code status:      - [facility] completed:      [  ] NO     [  ] YES    ---  TIME SPENT:  I, the attending physician, was physically present for the key portions of the evaluation and management (E/M) service provided. The total amount of time spent reviewing the hospital notes, laboratory values, imaging findings, assessing/counseling the patient, discussing with consultant physicians, social work, nursing staff was -- minutes       ADMISSION DATE:  03-30-22    ---  FROM ADMISSION H+P:   HPI:  78 yo M PMH of A fib (on Eliquis) , MGUS s/p PRBC transfusion 10/21, anxiety/depression, CAD s/p stents (not on plavix), BPH, CHF, Diverticulosis, HLD, HTN, thyroid nodules, CKD stage 3, DM2 presents to the ED from assisted after having up to 3 bowel movements with dark/black stool. Patient would have another episode of dark stool in the ED. Patient denied consuming any beets or magnesium/calcium supplements recently. Patient states that onset of stool was very sudden. Patient recently admitted to [facility] in January for CHF exacerbation, improved w/ diuresis. Patient was seen and examined at bedside, denied any headache, cp/pressure, palpitations, myalgias, fevers/chills, SOB, hematuria, dysuria. Patient endorses lightheadedness/dizziness, 4/10 non-radiating sharp periumbilical pain, and chronic knee pain.     In ED:   Vitals: 97.5F, HR 69, 133/81, RR 18, 96% on RA   Labs significant for: Occult blood +, GFR 29, INR 2.01, Creatinine 2.3 (baseline 2.2)  Imaging: CT chest: Few 2mm nodules, coronary artery calcification  CT abd/pelvis: diverticulosis w/o diverticulitis   CT head: No acute intracranial hemorrhage or acute territorial infarct   EKG: Sinus rhythm w/ 1st degree heart block @ 60bpm, incomplete RBBB unchanged from prior ekg   Recieved: Pantoprazole infusion   (30 Mar 2022 15:40)      ---  HOSPITAL COURSE/PERTINENT LABS/PROCEDURES PERFORMED/PENDING TESTS:   Pt was admitted for GI bleed on 3/30/22. Pt received multiple prbc transfusions and had close monitoring of hemoglobin levels. Colonoscopy on 4/1 revealed old blood in L colon, multiple diverticuli from proximal transverse to sigmoid colon, no active bleeding was noted. Poor prep did not allow for evaluation of possible small lesions or polyps. Aspirin and eliquis were held. Pt was placed on clear liquid diet, diet was advanced to full liquid diet on 4/2 and advanced to regular diet on 4/3 which he tolerated. Cardiology was consulted for pre-procedure optimization. 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 Pt was given 4u prbc during hospital stay. Hematology was consulted and pt was given Venofer x3. Hgb remained stable. Pt was treated with home meds for chronic conditions - afib (eliquis held), CAD (statin), CHF (lasix), HTN (antihypertensives), anxiety and depression, T2DM (insulin sliding scale). Nephrology was consulted for CKD. Pt was started on Potassium chloride 20 mg qd for persistently low potassium.     Patient is stable for discharge as per primary medical team and consultants.    PT consulted, recommends discharge home - no PT needs.    Patient showed improvement throughout hospitalization. Patient was seen and examined on day of discharge. Patient was medically optimized for discharge with close outpatient follow up.    UPDATED 4/4  ---  PATIENT CONDITION:  - stable    ---  CONSULTANTS:   - Heme - Dr. Rodriguez  - GI - Dr. Bah  - Cardio - Dr. Posey  - Nephro - Dr. Rodarte    ---  TIME SPENT:  I, the attending physician, was physically present for the key portions of the evaluation and management (E/M) service provided. The total amount of time spent reviewing the hospital notes, laboratory values, imaging findings, assessing/counseling the patient, discussing with consultant physicians, social work, nursing staff was -- minutes       ADMISSION DATE:  03-30-22    ---  FROM ADMISSION H+P:   HPI:  76 yo M PMH of A fib (on Eliquis) , MGUS s/p PRBC transfusion 10/21, anxiety/depression, CAD s/p stents (not on plavix), BPH, CHF, Diverticulosis, HLD, HTN, thyroid nodules, CKD stage 3, DM2 presents to the ED from [facility] after having up to 3 bowel movements with dark/black stool. Patient would have another episode of dark stool in the ED. Patient denied consuming any beets or magnesium/calcium supplements recently. Patient states that onset of stool was very sudden. Patient recently admitted to [facility] in January for CHF exacerbation, improved w/ diuresis. Patient was seen and examined at bedside, denied any headache, cp/pressure, palpitations, myalgias, fevers/chills, SOB, hematuria, dysuria. Patient endorses lightheadedness/dizziness, 4/10 non-radiating sharp periumbilical pain, and chronic knee pain.     In ED:   Vitals: 97.5F, HR 69, 133/81, RR 18, 96% on RA   Labs significant for: Occult blood +, GFR 29, INR 2.01, Creatinine 2.3 (baseline 2.2)  Imaging: CT chest: Few 2mm nodules, coronary artery calcification  CT abd/pelvis: diverticulosis w/o diverticulitis   CT head: No acute intracranial hemorrhage or acute territorial infarct   EKG: Sinus rhythm w/ 1st degree heart block @ 60bpm, incomplete RBBB unchanged from prior ekg   Recieved: Pantoprazole infusion   (30 Mar 2022 15:40)      ---  HOSPITAL COURSE/PERTINENT LABS/PROCEDURES PERFORMED/PENDING TESTS:   Pt was admitted for GI bleed on 3/30/22. Pt received multiple prbc transfusions and had close monitoring of hemoglobin levels. Colonoscopy on 4/1 revealed old blood in L colon, multiple diverticuli from proximal transverse to sigmoid colon, no active bleeding was noted. Poor prep did not allow for evaluation of possible small lesions or polyps. Aspirin and eliquis were held. Pt was placed on clear liquid diet, diet was advanced to full liquid diet on 4/2 and advanced to regular diet on 4/3 which he tolerated. Cardiology was consulted for pre-procedure optimization. 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 Pt was given 4u prbc during hospital stay. Hematology was consulted and pt was given Venofer x3. Hgb remained stable. Pt was treated with home meds for chronic conditions - afib (eliquis held), CAD (statin), CHF (lasix), HTN (antihypertensives), anxiety and depression, T2DM (insulin sliding scale). Nephrology was consulted for CKD. Pt was started on Potassium chloride 20 mg qd for persistently low potassium. Pt had a     Patient is stable for discharge as per primary medical team and consultants.    PT consulted, recommends discharge home - no PT needs.    Patient showed improvement throughout hospitalization. Patient is stable for discharge to [facility] for possible embolization and/or capsule endoscopy.     ---  PATIENT CONDITION:  - stable for transfer     ---  CONSULTANTS:   - Heme - Dr. Rodriguez  - GI - Dr. Bah  - Cardio - Dr. Posey  - Nephro - Dr. Rodarte    ---  TIME SPENT:  I, the attending physician, was physically present for the key portions of the evaluation and management (E/M) service provided. The total amount of time spent reviewing the hospital notes, laboratory values, imaging findings, assessing/counseling the patient, discussing with consultant physicians, social work, nursing staff was -- minutes       ADMISSION DATE:  03-30-22    ---  FROM ADMISSION H+P:   HPI:  76 yo M PMH of A fib (on Eliquis) , MGUS s/p PRBC transfusion 10/21, anxiety/depression, CAD s/p stents (not on plavix), BPH, CHF, Diverticulosis, HLD, HTN, thyroid nodules, CKD stage 3, DM2 [facility] after having up to 3 bowel movements with dark/black stool. Patient would have another episode of dark stool in the ED. Patient denied consuming any beets or magnesium/calcium supplements recently. Patient states that onset of stool was very sudden. Patient recently admitted to [facility] in January for CHF exacerbation, improved w/ diuresis. Patient was seen and examined at bedside, denied any headache, cp/pressure, palpitations, myalgias, fevers/chills, SOB, hematuria, dysuria. Patient endorses lightheadedness/dizziness, 4/10 non-radiating sharp periumbilical pain, and chronic knee pain.     In ED:   Vitals: 97.5F, HR 69, 133/81, RR 18, 96% on RA   Labs significant for: Occult blood +, GFR 29, INR 2.01, Creatinine 2.3 (baseline 2.2)  Imaging: CT chest: Few 2mm nodules, coronary artery calcification  CT abd/pelvis: diverticulosis w/o diverticulitis   CT head: No acute intracranial hemorrhage or acute territorial infarct   EKG: Sinus rhythm w/ 1st degree heart block @ 60bpm, incomplete RBBB unchanged from prior ekg   Recieved: Pantoprazole infusion   (30 Mar 2022 15:40)      ---  HOSPITAL COURSE/PERTINENT LABS/PROCEDURES PERFORMED/PENDING TESTS:   Pt was admitted for GI bleed on 3/30/22.  Dark stool & some BRBPR ,  Pt was seen by GI Dr Bah , HOLD ASA, ELiquis,  Cardiology- DR Posey -HOLD AC / ASA,  stable from Cardiac stand point for colonoscopy , Pt received multiple prbc transfusions  ~4 units and had close monitoring of hemoglobin levels. Colonoscopy on 4/1 revealed old blood in L colon, multiple diverticuli from proximal transverse to sigmoid colon, no active bleeding was noted. Poor prep did not allow for evaluation of possible small lesions or polyps. Aspirin and eliquis were held.  pt was seen by Hematology Dr Rodriguez & Renal DR Rodarte  for CKD 3 ,  Pt's H/H Remained Low stable  with No further BRBPR  & so Pt was placed on clear liquid diet, diet was advanced to full liquid diet on 4/2 and advanced to regular diet on 4/3 which he tolerated. Cardiology was consulted for pre-procedure optimization. 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 Pt was given 4u prbc during hospital stay. Hematology was consulted and pt was given Venofer x3. Hgb remained stable. Pt was treated with home meds for chronic conditions - afib (eliquis held), CAD (statin), CHF (lasix), HTN (antihypertensives), anxiety and depression, T2DM (insulin sliding scale). Nephrology was consulted for CKD. Pt was started on Potassium chloride 20 mg qd for persistently low potassium. Pt had BRBPR today  after noon , Bleeding scan ordered which showed Active bleeding in small bowel likely Jejunum , d/w Dr Crockett/ DR RIKY Chavez pt is being transferred to [facility] for   Patient is stable for discharge as per primary medical team and consultants.    PT consulted, recommends discharge home - no PT needs.    Patient showed improvement throughout hospitalization. Patient is stable for discharge to [facility] for possible embolization and/or capsule endoscopy.     ---  PATIENT CONDITION:  - stable for transfer     ---  CONSULTANTS:   - Heme - Dr. Rodriguez  - GI - Dr. Bah  - Cardio - Dr. Posey  - Nephro - Dr. Rodarte    ---  TIME SPENT:  I, the attending physician, was physically present for the key portions of the evaluation and management (E/M) service provided. The total amount of time spent reviewing the hospital notes, laboratory values, imaging findings, assessing/counseling the patient, discussing with consultant physicians, social work, nursing staff was -- minutes       ADMISSION DATE:  03-30-22    ---  FROM ADMISSION H+P:   HPI:  76 yo M PMH of A fib (on Eliquis) , MGUS s/p PRBC transfusion 10/21, anxiety/depression, CAD s/p stents (not on plavix), BPH, CHF, Diverticulosis, HLD, HTN, thyroid nodules, CKD stage 3, DM2 presents to the ED from [facility] after having up to 3 bowel movements with dark/black stool. Patient would have another episode of dark stool in the ED. Patient denied consuming any beets or magnesium/calcium supplements recently. Patient states that onset of stool was very sudden. Patient recently admitted to [facility] in January for CHF exacerbation, improved w/ diuresis. Patient was seen and examined at bedside, denied any headache, cp/pressure, palpitations, myalgias, fevers/chills, SOB, hematuria, dysuria. Patient endorses lightheadedness/dizziness, 4/10 non-radiating sharp periumbilical pain, and chronic knee pain.     In ED:   Vitals: 97.5F, HR 69, 133/81, RR 18, 96% on RA   Labs significant for: Occult blood +, GFR 29, INR 2.01, Creatinine 2.3 (baseline 2.2)  Imaging: CT chest: Few 2mm nodules, coronary artery calcification  CT abd/pelvis: diverticulosis w/o diverticulitis   CT head: No acute intracranial hemorrhage or acute territorial infarct   EKG: Sinus rhythm w/ 1st degree heart block @ 60bpm, incomplete RBBB unchanged from prior ekg   Recieved: Pantoprazole infusion   (30 Mar 2022 15:40)      ---  HOSPITAL COURSE/PERTINENT LABS/PROCEDURES PERFORMED/PENDING TESTS:   Pt was admitted for GI bleed on 3/30/22.  Dark stool & some BRBPR ,  Pt was seen by GI Dr Bah , HOLD ASA, ELiquis,  Cardiology- DR Posey -HOLD AC / ASA,  stable from Cardiac stand point for colonoscopy , Pt received multiple prbc transfusions  ~4 units and had close monitoring of hemoglobin levels. Colonoscopy on 4/1 revealed old blood in L colon, multiple diverticuli from proximal transverse to sigmoid colon, no active bleeding was noted. Poor prep did not allow for evaluation of possible small lesions or polyps. Aspirin and eliquis were held.  pt was seen by Hematology Dr Rodriguez & Renal DR Rodarte  for CKD 3 ,  Pt's H/H Remained Low stable  with No further BRBPR  & so Pt was placed on clear liquid diet, diet was advanced to full liquid diet on 4/2 and advanced to regular diet on 4/3 which he tolerated. Cardiology was consulted for pre-procedure optimization. 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 Pt was given 4u prbc during hospital stay. Hematology was consulted and pt was given Venofer x3. Hgb remained stable. Pt was treated with home meds for chronic conditions - afib (eliquis held), CAD (statin), CHF (lasix), HTN (antihypertensives), anxiety and depression, T2DM (insulin sliding scale). Nephrology was consulted for CKD. Pt was started on Potassium chloride 20 mg qd for persistently low potassium. Pt had BRBPR today  after noon , Bleeding scan ordered which showed Active bleeding in small bowel likely Jejunum , d/w Dr Crockett/ DR RIKY Chavez pt is being transferred to [facility] for further work up  . Patient is stable for discharge to [facility] for possible embolization and/or capsule endoscopy.  with push enteroscopy if stable as d/w DR Crockett. & cardiology.    IMPRESSION: Abnormal GI bleeding scan.    I is compatible with small bowel bleed, probably jejunal.      Patient is stable for discharge as per primary medical team and consultants.    PT consulted, recommends discharge home - no PT needs.      ---  PATIENT CONDITION:  - stable for transfer     ---  CONSULTANTS:   - Heme - Dr. Rodriguez  - GI - Dr. Bah  - Cardio - Dr. Posey  - Nephro - Dr. Rodarte    ---  TIME SPENT:  I, the attending physician, was physically present for the key portions of the evaluation and management (E/M) service provided. The total amount of time spent reviewing the hospital notes, laboratory values, imaging findings, assessing/counseling the patient, discussing with consultant physicians, social work, nursing staff was 65 minutes

## 2022-03-31 NOTE — CONSULT NOTE ADULT - SUBJECTIVE AND OBJECTIVE BOX
Patient is a 77y old  Male who presents with a chief complaint of Rectal bleed (31 Mar 2022 11:05)      HPI:  76 yo M PMH of A fib (on Eliquis) , MGUS s/p PRBC transfusion 10/21, anxiety/depression, CAD s/p stents (not on plavix), BPH, CHF, Diverticulosis, HLD, HTN, thyroid nodules, CKD stage 3, DM2 presents to the ED from [facility] after having up to 3 bowel movements with dark/black stool. Patient would have another episode of dark stool in the ED. Patient denied consuming any beets or magnesium/calcium supplements recently. Patient states that onset of stool was very sudden. Patient recently admitted to [facility] in January for CHF exacerbation, improved w/ diuresis. Patient was seen and examined at bedside, denied any headache, cp/pressure, palpitations, myalgias, fevers/chills, SOB, hematuria, dysuria. Patient endorses lightheadedness/dizziness, 4/10 non-radiating sharp periumbilical pain, and chronic knee pain.     In ED:   Vitals: 97.5F, HR 69, 133/81, RR 18, 96% on RA   Labs significant for: Occult blood +, GFR 29, INR 2.01, Creatinine 2.3 (baseline 2.2)  Imaging: CT chest: Few 2mm nodules, coronary artery calcification  CT abd/pelvis: diverticulosis w/o diverticulitis   CT head: No acute intracranial hemorrhage or acute territorial infarct   EKG: Sinus rhythm w/ 1st degree heart block @ 60bpm, incomplete RBBB unchanged from prior ekg   Recieved: Pantoprazole infusion   (30 Mar 2022 15:40)       ROS:  Negative except for:    PAST MEDICAL & SURGICAL HISTORY:  HTN (hypertension)  c/b multiple episodes of hypertensive urgency    HLD (hyperlipidemia)    Atrial fibrillation  first documented on EKG 10/7/2021    CAD (coronary artery disease)  s/p stents (not on plavix)    Type 2 diabetes mellitus  not on home insulin/ Meds    Anxiety  Depression  multiple psych medications    History of diverticulitis  07/2021    Diverticulosis  c/b GIB in 2020    Afib  on AC    Stage 3 chronic kidney disease    Anemia of chronic disease  Monoclonal Gammopathy-MGUS  pRBC transfusion 10/15/21    Chronic diastolic congestive heart failure    Multiple thyroid nodules    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  Had surgery ( April 2013 )    S/P tonsillectomy    S/P arthroscopic knee surgery  Bilateral ( 2005 )    Torsion of testicle  Had surgery at age 14    Pilonidal cyst  Had surgery ( 1969 )    S/P cataract surgery  Bilateral    H/O hernia repair        SOCIAL HISTORY:    FAMILY HISTORY:  FHx: throat cancer    No family history of colorectal cancer        MEDICATIONS  (STANDING):  amLODIPine   Tablet 10 milliGRAM(s) Oral daily  atorvastatin 10 milliGRAM(s) Oral at bedtime  budesonide 160 MICROgram(s)/formoterol 4.5 MICROgram(s) Inhaler 2 Puff(s) Inhalation two times a day  carvedilol 6.25 milliGRAM(s) Oral every 12 hours  cloNIDine 0.2 milliGRAM(s) Oral three times a day  cyanocobalamin 1000 MICROGram(s) Oral daily  dextrose 5%. 1000 milliLiter(s) (100 mL/Hr) IV Continuous <Continuous>  dextrose 5%. 1000 milliLiter(s) (50 mL/Hr) IV Continuous <Continuous>  dextrose 50% Injectable 25 Gram(s) IV Push once  dextrose 50% Injectable 12.5 Gram(s) IV Push once  dextrose 50% Injectable 25 Gram(s) IV Push once  doxazosin 4 milliGRAM(s) Oral <User Schedule>  folic acid 1 milliGRAM(s) Oral daily  furosemide    Tablet 60 milliGRAM(s) Oral two times a day  glucagon  Injectable 1 milliGRAM(s) IntraMuscular once  hydrALAZINE 100 milliGRAM(s) Oral three times a day  insulin lispro (ADMELOG) corrective regimen sliding scale   SubCutaneous three times a day before meals  insulin lispro (ADMELOG) corrective regimen sliding scale   SubCutaneous at bedtime  isosorbide   mononitrate ER Tablet (IMDUR) 90 milliGRAM(s) Oral daily  lamoTRIgine 100 milliGRAM(s) Oral daily  oxybutynin XL 15 milliGRAM(s) Oral daily  pantoprazole  Injectable 40 milliGRAM(s) IV Push daily  polyethylene glycol 3350 17 Gram(s) Oral daily  polyethylene glycol/electrolyte Solution 1000 milliLiter(s) Oral every 4 hours  senna 2 Tablet(s) Oral at bedtime  venlafaxine XR. 150 milliGRAM(s) Oral daily    MEDICATIONS  (PRN):  acetaminophen     Tablet .. 650 milliGRAM(s) Oral every 6 hours PRN Temp greater or equal to 38C (100.4F), Mild Pain (1 - 3)  aluminum hydroxide/magnesium hydroxide/simethicone Suspension 30 milliLiter(s) Oral every 4 hours PRN Dyspepsia  dextrose Oral Gel 15 Gram(s) Oral once PRN Blood Glucose LESS THAN 70 milliGRAM(s)/deciliter  melatonin 3 milliGRAM(s) Oral at bedtime PRN Insomnia  ondansetron Injectable 4 milliGRAM(s) IV Push every 8 hours PRN Nausea and/or Vomiting      Allergies    No Known Allergies    Intolerances        Vital Signs Last 24 Hrs  T(C): 36.8 (31 Mar 2022 20:30), Max: 36.8 (31 Mar 2022 20:30)  T(F): 98.3 (31 Mar 2022 20:30), Max: 98.3 (31 Mar 2022 20:30)  HR: 70 (31 Mar 2022 20:30) (65 - 76)  BP: 147/73 (31 Mar 2022 20:30) (111/58 - 160/82)  BP(mean): --  RR: 17 (31 Mar 2022 20:30) (17 - 18)  SpO2: 95% (31 Mar 2022 20:30) (93% - 95%)    PHYSICAL EXAM  General: adult in NAD  HEENT: clear oropharynx, anicteric sclera, pink conjunctivae  Neck: supple  CV: normal S1S2 with no murmur rubs or gallops  Lungs: clear to auscultation, no wheezes, no rhales  Abdomen: soft non-tender non-distended, no hepato/splenomegaly  Ext: no clubbing cyanosis or edema  Skin: no rashes and no petichiae  Neuro: alert and oriented X3 no focal deficits      LABS:    CBC Full  -  ( 31 Mar 2022 18:30 )  WBC Count : x  RBC Count : x  Hemoglobin : 8.2 g/dL  Hematocrit : 24.0 %  Platelet Count - Automated : x  Mean Cell Volume : x  Mean Cell Hemoglobin : x  Mean Cell Hemoglobin Concentration : x  Auto Neutrophil # : x  Auto Lymphocyte # : x  Auto Monocyte # : x  Auto Eosinophil # : x  Auto Basophil # : x  Auto Neutrophil % : x  Auto Lymphocyte % : x  Auto Monocyte % : x  Auto Eosinophil % : x  Auto Basophil % : x    03-31    140  |  105  |  46<H>  ----------------------------<  120<H>  3.3<L>   |  26  |  2.30<H>    Ca    8.9      31 Mar 2022 08:09    TPro  5.5<L>  /  Alb  2.9<L>  /  TBili  0.2  /  DBili  x   /  AST  10<L>  /  ALT  13  /  AlkPhos  51  03-31    PT/INR - ( 31 Mar 2022 16:46 )   PT: 17.7 sec;   INR: 1.51 ratio         PTT - ( 30 Mar 2022 10:49 )  PTT:41.9 sec  Iron - Total Binding Capacity.: 230 ug/dL (03-31 @ 11:49)  Ferritin, Serum: 40 ng/mL (03-31 @ 11:49)          BLOOD SMEAR INTERPRETATION:    RADIOLOGY & ADDITIONAL STUDIES:

## 2022-03-31 NOTE — DISCHARGE NOTE PROVIDER - PROVIDER TOKENS
PROVIDER:[TOKEN:[[id number]],FOLLOWUP:[1 week]],PROVIDER:[TOKEN:[[id number]],FOLLOWUP:[1 week]],PROVIDER:[TOKEN:[[id number]],FOLLOWUP:[2 weeks]],PROVIDER:[TOKEN:[[id number]],FOLLOWUP:[1 week]]

## 2022-03-31 NOTE — PROGRESS NOTE ADULT - PROBLEM SELECTOR PLAN 2
Likely Ac Blood loss anemia in the setting of GIB- AC on chronic anemia  - H/H 9.2 on admission, baseline hemoglobin ~10   - Currently hemodynamically stable, patient w/ active bleeding AM hgb 7.2, to receive 2u PRBC   - Consider transfusion for hemoglobin <8   - F/u iron panel: iron, ferritin, TIBC, transferrin  - F/u folate, vitamin B12, TSH  - F/u AM CBC Likely Ac Blood loss anemia in the setting of GIB- AC on chronic anemia  - H/H 9.2 on admission, baseline hemoglobin ~10   - Currently hemodynamically stable, patient w/ active bleeding AM hgb 7.2, to receive 2u PRBC   - Consider transfusion for hemoglobin <8   - F/u iron panel: iron, ferritin, TIBC, transferrin  - F/u folate, vitamin B12, TSH  - F/u AM CBC  - Heme-onc consult Jennifer

## 2022-03-31 NOTE — CONSULT NOTE ADULT - SUBJECTIVE AND OBJECTIVE BOX
Patient is a 77y old  Male who presents with a chief complaint of Rectal bleed (31 Mar 2022 08:36)    HPI:  76 yo M PMH of A fib (on Eliquis) , MGUS s/p PRBC transfusion 10/21, anxiety/depression, CAD s/p stents (not on plavix), BPH, CHF, Diverticulosis, HLD, HTN, thyroid nodules, CKD stage 3, DM2 presents to the ED from [facility] after having up to 3 bowel movements with dark/black stool. Patient would have another episode of dark stool in the ED. Patient denied consuming any beets or magnesium/calcium supplements recently. Patient states that onset of stool was very sudden. Patient recently admitted to [facility] in January for CHF exacerbation, improved w/ diuresis. Patient was seen and examined at bedside, denied any headache, cp/pressure, palpitations, myalgias, fevers/chills, SOB, hematuria, dysuria. Patient endorses lightheadedness/dizziness, 4/10 non-radiating sharp periumbilical pain, and chronic knee pain.     In ED:   Vitals: 97.5F, HR 69, 133/81, RR 18, 96% on RA   Labs significant for: Occult blood +, GFR 29, INR 2.01, Creatinine 2.3 (baseline 2.2)  Imaging: CT chest: Few 2mm nodules, coronary artery calcification  CT abd/pelvis: diverticulosis w/o diverticulitis   CT head: No acute intracranial hemorrhage or acute territorial infarct   EKG: Sinus rhythm w/ 1st degree heart block @ 60bpm, incomplete RBBB unchanged from prior ekg   Recieved: Pantoprazole infusion   (30 Mar 2022 15:40)      PAST MEDICAL HISTORY:  Hypertension    Diabetes mellitus    Lupus    Hepatitis C    Anxiety and depression    CAD (coronary artery disease)    Diverticulosis    Hyperlipidemia    HTN (hypertension)    HLD (hyperlipidemia)    Atrial fibrillation    CAD (coronary artery disease)    Type 2 diabetes mellitus    Anxiety    History of diverticulitis    Diverticulosis    Afib    Stage 3 chronic kidney disease    Anemia of chronic disease    Chronic diastolic congestive heart failure    Multiple thyroid nodules        PAST SURGICAL HISTORY:  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    S/P tonsillectomy    S/P arthroscopic knee surgery    Torsion of testicle    Pilonidal cyst    S/P cataract surgery    H/O hernia repair        FAMILY HISTORY:  FHx: throat cancer    No family history of colorectal cancer        SOCIAL HISTORY:    Allergies    No Known Allergies    Intolerances      Home Medications:  Aristada 1064 mg/3.9 mL intramuscular suspension, extended release: 1 dose(s) intramuscular every 8 weeks (30 Mar 2022 15:58)  carvedilol 6.25 mg oral tablet: 1 tab(s) orally 2 times a day (30 Mar 2022 15:58)  cloNIDine 0.2 mg oral tablet: 1 tab(s) orally 3 times a day (30 Mar 2022 15:58)  diclofenac 1% topical gel: Apply topically to affected area 3 times a day (30 Mar 2022 15:58)  doxazosin 4 mg oral tablet: 1 tab(s) orally 2 times a day (30 Mar 2022 15:58)  furosemide 20 mg oral tablet: 3 tab(s) orally 2 times a day (30 Mar 2022 15:58)  lactobacillus acidophilus oral capsule: 1 cap(s) orally 2 times a day (30 Mar 2022 15:58)  lamoTRIgine 100 mg oral tablet: 1 tab(s) orally once a day (30 Mar 2022 15:58)  oxybutynin 15 mg/24 hr oral tablet, extended release: 1 tab(s) orally once a day (30 Mar 2022 15:58)  Vitamin B-12 1000 mcg oral tablet: 1 tab(s) orally once a day (30 Mar 2022 15:58)    MEDICATIONS  (STANDING):  amLODIPine   Tablet 10 milliGRAM(s) Oral daily  atorvastatin 10 milliGRAM(s) Oral at bedtime  bisacodyl 10 milliGRAM(s) Oral every 4 hours  budesonide 160 MICROgram(s)/formoterol 4.5 MICROgram(s) Inhaler 2 Puff(s) Inhalation two times a day  carvedilol 6.25 milliGRAM(s) Oral every 12 hours  cloNIDine 0.2 milliGRAM(s) Oral three times a day  cyanocobalamin 1000 MICROGram(s) Oral daily  dextrose 5%. 1000 milliLiter(s) (100 mL/Hr) IV Continuous <Continuous>  dextrose 5%. 1000 milliLiter(s) (50 mL/Hr) IV Continuous <Continuous>  dextrose 50% Injectable 25 Gram(s) IV Push once  dextrose 50% Injectable 12.5 Gram(s) IV Push once  dextrose 50% Injectable 25 Gram(s) IV Push once  doxazosin 4 milliGRAM(s) Oral <User Schedule>  folic acid 1 milliGRAM(s) Oral daily  furosemide    Tablet 60 milliGRAM(s) Oral two times a day  glucagon  Injectable 1 milliGRAM(s) IntraMuscular once  hydrALAZINE 100 milliGRAM(s) Oral three times a day  insulin lispro (ADMELOG) corrective regimen sliding scale   SubCutaneous three times a day before meals  insulin lispro (ADMELOG) corrective regimen sliding scale   SubCutaneous at bedtime  isosorbide   mononitrate ER Tablet (IMDUR) 90 milliGRAM(s) Oral daily  lamoTRIgine 100 milliGRAM(s) Oral daily  oxybutynin XL 15 milliGRAM(s) Oral daily  pantoprazole  Injectable 40 milliGRAM(s) IV Push daily  polyethylene glycol 3350 17 Gram(s) Oral daily  polyethylene glycol/electrolyte Solution 1000 milliLiter(s) Oral every 4 hours  potassium chloride    Tablet ER 40 milliEquivalent(s) Oral every 4 hours  senna 2 Tablet(s) Oral at bedtime  venlafaxine XR. 150 milliGRAM(s) Oral daily    MEDICATIONS  (PRN):  acetaminophen     Tablet .. 650 milliGRAM(s) Oral every 6 hours PRN Temp greater or equal to 38C (100.4F), Mild Pain (1 - 3)  aluminum hydroxide/magnesium hydroxide/simethicone Suspension 30 milliLiter(s) Oral every 4 hours PRN Dyspepsia  dextrose Oral Gel 15 Gram(s) Oral once PRN Blood Glucose LESS THAN 70 milliGRAM(s)/deciliter  melatonin 3 milliGRAM(s) Oral at bedtime PRN Insomnia  ondansetron Injectable 4 milliGRAM(s) IV Push every 8 hours PRN Nausea and/or Vomiting      REVIEW OF SYSTEMS:  General:   Respiratory: No cough, SOB  Cardiovascular: No CP or Palpitations	  Gastrointestinal: No nausea, Vomiting. No diarrhea  Genitourinary: No urinary complaints	  Musculoskeletal: No leg swelling, No new rash or lesions	  Neurological: 	  all other systems negative    T(F): 97.7 (22 @ 05:05), Max: 98.5 (22 @ 21:20)  HR: 76 (22 @ 05:05) (63 - 78)  BP: 124/72 (22 @ 05:05) (124/72 - 160/82)  RR: 18 (22 @ 05:05) (16 - 18)  SpO2: 94% (22 @ 05:05) (94% - 99%)  Wt(kg): --    PHYSICAL EXAM:  General: NAD  Respiratory: b/l air entry  Cardiovascular: S1 S2  Gastrointestinal: soft  Extremities: edema            140  |  105  |  46<H>  ----------------------------<  120<H>  3.3<L>   |  26  |  2.30<H>    Ca    8.9      31 Mar 2022 08:09    TPro  5.5<L>  /  Alb  2.9<L>  /  TBili  0.2  /  DBili  x   /  AST  10<L>  /  ALT  13  /  AlkPhos  51                            7.1    5.14  )-----------( 200      ( 31 Mar 2022 08:09 )             21.5       Potassium, Serum: 3.3 mmol/L ( @ 08:09)  Blood Urea Nitrogen, Serum: 46 mg/dL ( @ 08:09)  Calcium, Total Serum: 8.9 mg/dL ( @ 08:09)  Hemoglobin: 7.1 g/dL ( @ 08:09)      Creatinine, Serum: 2.30 ( @ 08:09)  Creatinine, Serum: 2.30 ( @ 10:49)      Urinalysis Basic - ( 31 Mar 2022 05:27 )    Color: Pale Yellow / Appearance: Clear / S.015 / pH: x  Gluc: x / Ketone: Negative  / Bili: Negative / Urobili: Negative   Blood: x / Protein: 30 mg/dL / Nitrite: Negative   Leuk Esterase: Trace / RBC: 0-2 /HPF / WBC 3-5   Sq Epi: x / Non Sq Epi: Occasional / Bacteria: x      LIVER FUNCTIONS - ( 31 Mar 2022 08:09 )  Alb: 2.9 g/dL / Pro: 5.5 g/dL / ALK PHOS: 51 U/L / ALT: 13 U/L / AST: 10 U/L / GGT: x                       I&O's Detail           Patient is a 77y old  Male who presents with a chief complaint of Rectal bleed (31 Mar 2022 08:36)    HPI:  78 yo M PMH of A fib (on Eliquis) , MGUS s/p PRBC transfusion 10/21, anxiety/depression, CAD s/p stents (not on plavix), BPH, CHF, Diverticulosis, HLD, HTN, thyroid nodules, CKD stage 3, DM2 [facility] after having up to 3 bowel movements with dark/black stool. Patient would have another episode of dark stool in the ED. Patient denied consuming any beets or magnesium/calcium supplements recently. Patient states that onset of stool was very sudden. Patient recently admitted to [facility] in January for CHF exacerbation, improved w/ diuresis. Patient was seen and examined at bedside, denied any headache, cp/pressure, palpitations, myalgias, fevers/chills, SOB, hematuria, dysuria. Patient endorses lightheadedness/dizziness, 4/10 non-radiating sharp periumbilical pain, and chronic knee pain.     In ED:   Vitals: 97.5F, HR 69, 133/81, RR 18, 96% on RA   Labs significant for: Occult blood +, GFR 29, INR 2.01, Creatinine 2.3 (baseline 2.2)  Imaging: CT chest: Few 2mm nodules, coronary artery calcification  CT abd/pelvis: diverticulosis w/o diverticulitis   CT head: No acute intracranial hemorrhage or acute territorial infarct   EKG: Sinus rhythm w/ 1st degree heart block @ 60bpm, incomplete RBBB unchanged from prior ekg   Recieved: Pantoprazole infusion   (30 Mar 2022 15:40)    Renal consult called for chronic kidney disease stage 4. History obtained from chart and patient. PRBC transfusion in progress.       PAST MEDICAL HISTORY:  Hypertension    Diabetes mellitus    Lupus    Hepatitis C    Anxiety and depression    CAD (coronary artery disease)    Diverticulosis    Hyperlipidemia    HTN (hypertension)    HLD (hyperlipidemia)    Atrial fibrillation    CAD (coronary artery disease)    Type 2 diabetes mellitus    Anxiety    History of diverticulitis    Diverticulosis    Afib    Stage 3 chronic kidney disease    Anemia of chronic disease    Chronic diastolic congestive heart failure    Multiple thyroid nodules        PAST SURGICAL HISTORY:  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    S/P tonsillectomy    S/P arthroscopic knee surgery    Torsion of testicle    Pilonidal cyst    S/P cataract surgery    H/O hernia repair        FAMILY HISTORY:  FHx: throat cancer    No family history of colorectal cancer        SOCIAL HISTORY: No smoking or alcohol use     Allergies    No Known Allergies    Intolerances      Home Medications:  Aristada 1064 mg/3.9 mL intramuscular suspension, extended release: 1 dose(s) intramuscular every 8 weeks (30 Mar 2022 15:58)  carvedilol 6.25 mg oral tablet: 1 tab(s) orally 2 times a day (30 Mar 2022 15:58)  cloNIDine 0.2 mg oral tablet: 1 tab(s) orally 3 times a day (30 Mar 2022 15:58)  diclofenac 1% topical gel: Apply topically to affected area 3 times a day (30 Mar 2022 15:58)  doxazosin 4 mg oral tablet: 1 tab(s) orally 2 times a day (30 Mar 2022 15:58)  furosemide 20 mg oral tablet: 3 tab(s) orally 2 times a day (30 Mar 2022 15:58)  lactobacillus acidophilus oral capsule: 1 cap(s) orally 2 times a day (30 Mar 2022 15:58)  lamoTRIgine 100 mg oral tablet: 1 tab(s) orally once a day (30 Mar 2022 15:58)  oxybutynin 15 mg/24 hr oral tablet, extended release: 1 tab(s) orally once a day (30 Mar 2022 15:58)  Vitamin B-12 1000 mcg oral tablet: 1 tab(s) orally once a day (30 Mar 2022 15:58)    MEDICATIONS  (STANDING):  amLODIPine   Tablet 10 milliGRAM(s) Oral daily  atorvastatin 10 milliGRAM(s) Oral at bedtime  bisacodyl 10 milliGRAM(s) Oral every 4 hours  budesonide 160 MICROgram(s)/formoterol 4.5 MICROgram(s) Inhaler 2 Puff(s) Inhalation two times a day  carvedilol 6.25 milliGRAM(s) Oral every 12 hours  cloNIDine 0.2 milliGRAM(s) Oral three times a day  cyanocobalamin 1000 MICROGram(s) Oral daily  dextrose 5%. 1000 milliLiter(s) (100 mL/Hr) IV Continuous <Continuous>  dextrose 5%. 1000 milliLiter(s) (50 mL/Hr) IV Continuous <Continuous>  dextrose 50% Injectable 25 Gram(s) IV Push once  dextrose 50% Injectable 12.5 Gram(s) IV Push once  dextrose 50% Injectable 25 Gram(s) IV Push once  doxazosin 4 milliGRAM(s) Oral <User Schedule>  folic acid 1 milliGRAM(s) Oral daily  furosemide    Tablet 60 milliGRAM(s) Oral two times a day  glucagon  Injectable 1 milliGRAM(s) IntraMuscular once  hydrALAZINE 100 milliGRAM(s) Oral three times a day  insulin lispro (ADMELOG) corrective regimen sliding scale   SubCutaneous three times a day before meals  insulin lispro (ADMELOG) corrective regimen sliding scale   SubCutaneous at bedtime  isosorbide   mononitrate ER Tablet (IMDUR) 90 milliGRAM(s) Oral daily  lamoTRIgine 100 milliGRAM(s) Oral daily  oxybutynin XL 15 milliGRAM(s) Oral daily  pantoprazole  Injectable 40 milliGRAM(s) IV Push daily  polyethylene glycol 3350 17 Gram(s) Oral daily  polyethylene glycol/electrolyte Solution 1000 milliLiter(s) Oral every 4 hours  potassium chloride    Tablet ER 40 milliEquivalent(s) Oral every 4 hours  senna 2 Tablet(s) Oral at bedtime  venlafaxine XR. 150 milliGRAM(s) Oral daily    MEDICATIONS  (PRN):  acetaminophen     Tablet .. 650 milliGRAM(s) Oral every 6 hours PRN Temp greater or equal to 38C (100.4F), Mild Pain (1 - 3)  aluminum hydroxide/magnesium hydroxide/simethicone Suspension 30 milliLiter(s) Oral every 4 hours PRN Dyspepsia  dextrose Oral Gel 15 Gram(s) Oral once PRN Blood Glucose LESS THAN 70 milliGRAM(s)/deciliter  melatonin 3 milliGRAM(s) Oral at bedtime PRN Insomnia  ondansetron Injectable 4 milliGRAM(s) IV Push every 8 hours PRN Nausea and/or Vomiting      REVIEW OF SYSTEMS:  General: no distress  Respiratory: No cough, SOB  Cardiovascular: No CP or Palpitations	  Gastrointestinal: dark stools  Genitourinary: No urinary complaints	  Musculoskeletal: No new rash or lesions	  all other systems negative    T(F): 97.7 (22 @ 05:05), Max: 98.5 (22 @ 21:20)  HR: 76 (22 @ 05:05) (63 - 78)  BP: 124/72 (22 @ 05:05) (124/72 - 160/82)  RR: 18 (22 @ 05:05) (16 - 18)  SpO2: 94% (22 @ 05:05) (94% - 99%)  Wt(kg): --    PHYSICAL EXAM:  General: NAD  Respiratory: b/l air entry  Cardiovascular: S1 S2  Gastrointestinal: soft  Extremities: no edema            140  |  105  |  46<H>  ----------------------------<  120<H>  3.3<L>   |  26  |  2.30<H>    Ca    8.9      31 Mar 2022 08:09    TPro  5.5<L>  /  Alb  2.9<L>  /  TBili  0.2  /  DBili  x   /  AST  10<L>  /  ALT  13  /  AlkPhos  51                            7.1    5.14  )-----------( 200      ( 31 Mar 2022 08:09 )             21.5       Potassium, Serum: 3.3 mmol/L ( @ 08:09)  Blood Urea Nitrogen, Serum: 46 mg/dL ( @ 08:09)  Calcium, Total Serum: 8.9 mg/dL ( @ 08:09)  Hemoglobin: 7.1 g/dL ( @ 08:09)      Creatinine, Serum: 2.30 ( @ 08:09)  Creatinine, Serum: 2.30 ( @ 10:49)      Urinalysis Basic - ( 31 Mar 2022 05:27 )    Color: Pale Yellow / Appearance: Clear / S.015 / pH: x  Gluc: x / Ketone: Negative  / Bili: Negative / Urobili: Negative   Blood: x / Protein: 30 mg/dL / Nitrite: Negative   Leuk Esterase: Trace / RBC: 0-2 /HPF / WBC 3-5   Sq Epi: x / Non Sq Epi: Occasional / Bacteria: x      LIVER FUNCTIONS - ( 31 Mar 2022 08:09 )  Alb: 2.9 g/dL / Pro: 5.5 g/dL / ALK PHOS: 51 U/L / ALT: 13 U/L / AST: 10 U/L / GGT: x                   < from: CT Chest No Cont (22 @ 12:05) >    ACC: [id number] EXAM:  CT ABDOMEN AND PELVIS                        ACC: [id number] EXAM:  CT CHEST                          PROCEDURE DATE:  2022          INTERPRETATION:  CLINICAL INFORMATION: Rectal bleed and shortness of   breath    COMPARISON: CT chest 10/18/2021. CT chest abdomen pelvis 10/7/2021. Right   upper quadrant ultrasound 2021.    CONTRAST/COMPLICATIONS:  IV Contrast: NONE  Oral Contrast: NONE  Complications: None reported at time of study completion    PROCEDURE:  CT of the Chest, Abdomen and Pelvis was performed.  Sagittal and coronal reformats were performed.    FINDINGS:    CHEST:  LUNGS AND LARGE AIRWAYS: Central airways are patent. No focal   consolidation. Dependent scarring and minimal groundglass. Few scattered   2 mm pulmonary nodules. For reference, right upper lobe 2 mm nodule image   29 series 4, right lower lobe 2 mm nodule image 88 series 4, left upper   lobe 2 mm nodule image 26 series 4.  PLEURA: No pleural effusion or pneumothorax.  VESSELS: Atheromatous changes of the thoracic aorta. Main pulmonary   artery size is mildly enlarged, 3.2 cm.  HEART: Qualitatively, heart size is prominent. Aortic root/valve   calcification and coronary artery calcification. Trace pericardial fluid.  MEDIASTINUM AND SANDIP: No enlarged lymph nodes of the thorax. Small fluid   of the right infrahilar region near the right inferior pulmonary vein is   unchanged on multiple prior studies dating back to 2021, possibly a   pericardial cyst or recess. Esophagus is underdistended.  CHEST WALL AND LOWER NECK: No chest wall hematoma.    ABDOMEN AND PELVIS:    Solid organ evaluation limited due to noncontrast technique.    LIVER: Borderline enlarged liver measuring 18 cm in craniocaudal   dimension.  BILE DUCTS:No biliary distention  GALLBLADDER: Unremarkable CT appearance  SPLEEN: Normal size  PANCREAS: No main ductal dilatation  ADRENALS: Left adrenal gland thickening, similar to prior  KIDNEYS/URETERS: No hydronephrosis. Renal cysts, suboptimally   characterized due to lack of contrast.    BLADDER: Minimally distended.  REPRODUCTIVE ORGANS: Few coarse calcifications of the prostate    BOWEL: Stomach is underdistended. No small bowel distention. Normal   appendix. Mild stool burden of the colon limitsevaluation of the colonic   mucosa. Colonic diverticulosis, without evidence of acute diverticulitis.  PERITONEUM: No ascites.  VESSELS: No aneurysm of the abdominal aorta. Aortoiliac atheromatous   change.  RETROPERITONEUM/LYMPH NODES: Small volume nodes.  ABDOMINAL WALL: Postprocedural change at the umbilicus. New left   iliopsoas muscle calcification, image 158 series 2 since 10/6/2021.   Question prior injury / myositis ossificans.  BONES: Degenerative changes. Old left lateral rib fractures.    IMPRESSION:    Noncontrast study.    CHEST:  1. No large consolidation. Dependent scarring and minimal groundglass.  2. Few subcentimeter 2 mm nodules can be followed with dedicated chest CT   in 12 months based on patient risk factors.  3. Coronary artery calcification. Aortic root/valve calcification.    ABDOMEN/PELVIS:  1. Colonic diverticulosis, without diverticulitis. Given the history of   rectal bleeding, correlate with hemodynamics and hematocrit to guide   further management.    --- End of Report ---            JAZ OCONNOR M.D., ATTENDING RADIOLOGIST  This document has been electronically signed. Mar 30 2022 12:50PM    < end of copied text >  < from: Xray Chest 1 View- PORTABLE-Urgent (Xray Chest 1 View- PORTABLE-Urgent .) (22 @ 11:34) >    ACC: [id number] EXAM:  XR CHEST PORTABLE URGENT 1V                          PROCEDURE DATE:  2022          INTERPRETATION:  Clinical history: 77-year-old male, black stool.    Single view of the chest is correlated with a concurrent chest CT.    FINDINGS: Normal cardiac silhouette and normal pulmonary vasculature with   no consolidation, effusion, pneumothorax or acute osseous finding.    Mild bilateral lower lobe atelectasis/scarring.    IMPRESSION:  No acute radiographic findings, CT performed    --- End of Report ---            DIVYA VILLAGRAN DO; Attending Radiologist  This document has been electronically signed. Mar 30 2022  3:24PM    < end of copied text >

## 2022-03-31 NOTE — PROGRESS NOTE ADULT - PROBLEM SELECTOR PLAN 1
Patient presents with abdominal pain, rectal bleeding, fatigue likely 2/2 diverticular bleed  - H/H 9.2 on admission, baseline hemoglobin ~10 HOLD Eliquis   - CT a/p: diverticulosis without diverticulitis  - clear liquid diet    - Protonix 40 mg qD  - Hold antiplatelets, NSAIDs at this time,   - SCDs, hold pharmacologic DVT ppx  - Currently hemodynamically stable, monitor for signs and symptoms of active bleeding  - Consider transfusion for hemoglobin <8, to receive 2 units PRBCs today   - Blood transfusion consent signed and placed in chart  - Type and screen up to date  - If profuse bleeding and/or hemodynamically unstable, obtain CT angiogram to localize bleeding and IR consultation for possible embolization  - F/u vitamin B12, folate, iron panel: iron, ferritin, TIBC, transferrin  - GI (Dr. Bah) consulted, f/u recs, colonoscopy Friday

## 2022-03-31 NOTE — DISCHARGE NOTE PROVIDER - CARE PROVIDER_API CALL
Erich Man)  Medicine  [address] [place] [address]  Phone: [phone]  Fax: [phone]  Follow Up Time: 1 week    Jero Vizcaino  GASTROENTEROLOGY  [address] [facility], [address] [place] [address]  Phone: [phone]  Fax: [phone]  Follow Up Time: 1 week    ALEXA IZQUIERDO  Psychiatry & Neurology  [address] [place] [address]  Phone: [phone]  Fax: [phone]  Follow Up Time: 2 weeks    Bello Sheridan)  Cardio [facility]  [address] [place] [address]  Phone: [phone]  Fax: [phone]  Follow Up Time: 1 week

## 2022-04-01 LAB
ALBUMIN SERPL ELPH-MCNC: 2.9 G/DL — LOW (ref 3.3–5)
ALP SERPL-CCNC: 49 U/L — SIGNIFICANT CHANGE UP (ref 40–120)
ALT FLD-CCNC: 13 U/L — SIGNIFICANT CHANGE UP (ref 12–78)
ANION GAP SERPL CALC-SCNC: 12 MMOL/L — SIGNIFICANT CHANGE UP (ref 5–17)
AST SERPL-CCNC: 11 U/L — LOW (ref 15–37)
BASOPHILS # BLD AUTO: 0.02 K/UL — SIGNIFICANT CHANGE UP (ref 0–0.2)
BASOPHILS NFR BLD AUTO: 0.4 % — SIGNIFICANT CHANGE UP (ref 0–2)
BILIRUB SERPL-MCNC: 0.3 MG/DL — SIGNIFICANT CHANGE UP (ref 0.2–1.2)
BUN SERPL-MCNC: 38 MG/DL — HIGH (ref 7–23)
CALCIUM SERPL-MCNC: 8.9 MG/DL — SIGNIFICANT CHANGE UP (ref 8.5–10.1)
CHLORIDE SERPL-SCNC: 108 MMOL/L — SIGNIFICANT CHANGE UP (ref 96–108)
CO2 SERPL-SCNC: 24 MMOL/L — SIGNIFICANT CHANGE UP (ref 22–31)
CREAT SERPL-MCNC: 2.1 MG/DL — HIGH (ref 0.5–1.3)
EGFR: 32 ML/MIN/1.73M2 — LOW
EOSINOPHIL # BLD AUTO: 0.22 K/UL — SIGNIFICANT CHANGE UP (ref 0–0.5)
EOSINOPHIL NFR BLD AUTO: 4.8 % — SIGNIFICANT CHANGE UP (ref 0–6)
GLUCOSE SERPL-MCNC: 117 MG/DL — HIGH (ref 70–99)
HCT VFR BLD CALC: 22.3 % — LOW (ref 39–50)
HCT VFR BLD CALC: 23.5 % — LOW (ref 39–50)
HCT VFR BLD CALC: 23.9 % — LOW (ref 39–50)
HCV AB S/CO SERPL IA: 1.38 S/CO — HIGH (ref 0–0.99)
HCV AB SERPL-IMP: ABNORMAL
HCV RNA SPEC NAA+PROBE-LOG IU: SIGNIFICANT CHANGE UP
HCV RNA SPEC NAA+PROBE-LOG IU: SIGNIFICANT CHANGE UP LOGIU/ML
HGB BLD-MCNC: 7.6 G/DL — LOW (ref 13–17)
HGB BLD-MCNC: 7.9 G/DL — LOW (ref 13–17)
HGB BLD-MCNC: 8.2 G/DL — LOW (ref 13–17)
IMM GRANULOCYTES NFR BLD AUTO: 0.2 % — SIGNIFICANT CHANGE UP (ref 0–1.5)
LYMPHOCYTES # BLD AUTO: 1.01 K/UL — SIGNIFICANT CHANGE UP (ref 1–3.3)
LYMPHOCYTES # BLD AUTO: 21.9 % — SIGNIFICANT CHANGE UP (ref 13–44)
MAGNESIUM SERPL-MCNC: 2.3 MG/DL — SIGNIFICANT CHANGE UP (ref 1.6–2.6)
MCHC RBC-ENTMCNC: 29.2 PG — SIGNIFICANT CHANGE UP (ref 27–34)
MCHC RBC-ENTMCNC: 34.1 GM/DL — SIGNIFICANT CHANGE UP (ref 32–36)
MCV RBC AUTO: 85.8 FL — SIGNIFICANT CHANGE UP (ref 80–100)
MONOCYTES # BLD AUTO: 0.37 K/UL — SIGNIFICANT CHANGE UP (ref 0–0.9)
MONOCYTES NFR BLD AUTO: 8 % — SIGNIFICANT CHANGE UP (ref 2–14)
NEUTROPHILS # BLD AUTO: 2.99 K/UL — SIGNIFICANT CHANGE UP (ref 1.8–7.4)
NEUTROPHILS NFR BLD AUTO: 64.7 % — SIGNIFICANT CHANGE UP (ref 43–77)
NRBC # BLD: 0 /100 WBCS — SIGNIFICANT CHANGE UP (ref 0–0)
PHOSPHATE SERPL-MCNC: 3.6 MG/DL — SIGNIFICANT CHANGE UP (ref 2.5–4.5)
PLATELET # BLD AUTO: 161 K/UL — SIGNIFICANT CHANGE UP (ref 150–400)
POTASSIUM SERPL-MCNC: 3.3 MMOL/L — LOW (ref 3.5–5.3)
POTASSIUM SERPL-SCNC: 3.3 MMOL/L — LOW (ref 3.5–5.3)
PROT SERPL-MCNC: 5.2 G/DL — LOW (ref 6–8.3)
RBC # BLD: 2.6 M/UL — LOW (ref 4.2–5.8)
RBC # FLD: 16.5 % — HIGH (ref 10.3–14.5)
SODIUM SERPL-SCNC: 144 MMOL/L — SIGNIFICANT CHANGE UP (ref 135–145)
WBC # BLD: 4.62 K/UL — SIGNIFICANT CHANGE UP (ref 3.8–10.5)
WBC # FLD AUTO: 4.62 K/UL — SIGNIFICANT CHANGE UP (ref 3.8–10.5)

## 2022-04-01 PROCEDURE — 99233 SBSQ HOSP IP/OBS HIGH 50: CPT

## 2022-04-01 RX ORDER — POTASSIUM CHLORIDE 20 MEQ
40 PACKET (EA) ORAL EVERY 4 HOURS
Refills: 0 | Status: COMPLETED | OUTPATIENT
Start: 2022-04-01 | End: 2022-04-01

## 2022-04-01 RX ADMIN — Medication 100 MILLIGRAM(S): at 16:38

## 2022-04-01 RX ADMIN — ISOSORBIDE MONONITRATE 90 MILLIGRAM(S): 60 TABLET, EXTENDED RELEASE ORAL at 11:07

## 2022-04-01 RX ADMIN — Medication 1: at 16:33

## 2022-04-01 RX ADMIN — CARVEDILOL PHOSPHATE 6.25 MILLIGRAM(S): 80 CAPSULE, EXTENDED RELEASE ORAL at 05:53

## 2022-04-01 RX ADMIN — Medication 40 MILLIEQUIVALENT(S): at 11:07

## 2022-04-01 RX ADMIN — PREGABALIN 1000 MICROGRAM(S): 225 CAPSULE ORAL at 11:07

## 2022-04-01 RX ADMIN — Medication 100 MILLIGRAM(S): at 21:35

## 2022-04-01 RX ADMIN — Medication 40 MILLIEQUIVALENT(S): at 16:38

## 2022-04-01 RX ADMIN — Medication 0.2 MILLIGRAM(S): at 21:35

## 2022-04-01 RX ADMIN — Medication 15 MILLIGRAM(S): at 11:07

## 2022-04-01 RX ADMIN — Medication 60 MILLIGRAM(S): at 17:32

## 2022-04-01 RX ADMIN — CARVEDILOL PHOSPHATE 6.25 MILLIGRAM(S): 80 CAPSULE, EXTENDED RELEASE ORAL at 17:32

## 2022-04-01 RX ADMIN — Medication 0.2 MILLIGRAM(S): at 16:38

## 2022-04-01 RX ADMIN — Medication 4 MILLIGRAM(S): at 11:07

## 2022-04-01 RX ADMIN — Medication 0.2 MILLIGRAM(S): at 05:52

## 2022-04-01 RX ADMIN — Medication 100 MILLIGRAM(S): at 05:52

## 2022-04-01 RX ADMIN — PANTOPRAZOLE SODIUM 40 MILLIGRAM(S): 20 TABLET, DELAYED RELEASE ORAL at 16:37

## 2022-04-01 RX ADMIN — LAMOTRIGINE 100 MILLIGRAM(S): 25 TABLET, ORALLY DISINTEGRATING ORAL at 11:06

## 2022-04-01 RX ADMIN — Medication 150 MILLIGRAM(S): at 11:07

## 2022-04-01 RX ADMIN — ATORVASTATIN CALCIUM 10 MILLIGRAM(S): 80 TABLET, FILM COATED ORAL at 21:35

## 2022-04-01 RX ADMIN — AMLODIPINE BESYLATE 10 MILLIGRAM(S): 2.5 TABLET ORAL at 05:53

## 2022-04-01 RX ADMIN — Medication 60 MILLIGRAM(S): at 05:53

## 2022-04-01 RX ADMIN — Medication 4 MILLIGRAM(S): at 21:35

## 2022-04-01 RX ADMIN — Medication 1 MILLIGRAM(S): at 11:07

## 2022-04-01 NOTE — PROGRESS NOTE ADULT - SUBJECTIVE AND OBJECTIVE BOX
Patient is a 77y old  Male who presents with a chief complaint of Rectal bleed (2022 13:32)    Patient seen in follow up for CKD.        PAST MEDICAL HISTORY:  Hypertension    Diabetes mellitus    Lupus    Hepatitis C    Anxiety and depression    CAD (coronary artery disease)    Diverticulosis    Hyperlipidemia    HTN (hypertension)    HLD (hyperlipidemia)    Atrial fibrillation    CAD (coronary artery disease)    Type 2 diabetes mellitus    Anxiety    History of diverticulitis    Diverticulosis    Afib    Stage 3 chronic kidney disease    Anemia of chronic disease    Chronic diastolic congestive heart failure    Multiple thyroid nodules      MEDICATIONS  (STANDING):  amLODIPine   Tablet 10 milliGRAM(s) Oral daily  atorvastatin 10 milliGRAM(s) Oral at bedtime  budesonide 160 MICROgram(s)/formoterol 4.5 MICROgram(s) Inhaler 2 Puff(s) Inhalation two times a day  carvedilol 6.25 milliGRAM(s) Oral every 12 hours  cloNIDine 0.2 milliGRAM(s) Oral three times a day  cyanocobalamin 1000 MICROGram(s) Oral daily  dextrose 5%. 1000 milliLiter(s) (100 mL/Hr) IV Continuous <Continuous>  dextrose 5%. 1000 milliLiter(s) (50 mL/Hr) IV Continuous <Continuous>  dextrose 50% Injectable 25 Gram(s) IV Push once  dextrose 50% Injectable 12.5 Gram(s) IV Push once  dextrose 50% Injectable 25 Gram(s) IV Push once  doxazosin 4 milliGRAM(s) Oral <User Schedule>  folic acid 1 milliGRAM(s) Oral daily  furosemide    Tablet 60 milliGRAM(s) Oral two times a day  glucagon  Injectable 1 milliGRAM(s) IntraMuscular once  hydrALAZINE 100 milliGRAM(s) Oral three times a day  insulin lispro (ADMELOG) corrective regimen sliding scale   SubCutaneous three times a day before meals  insulin lispro (ADMELOG) corrective regimen sliding scale   SubCutaneous at bedtime  isosorbide   mononitrate ER Tablet (IMDUR) 90 milliGRAM(s) Oral daily  lamoTRIgine 100 milliGRAM(s) Oral daily  oxybutynin XL 15 milliGRAM(s) Oral daily  pantoprazole  Injectable 40 milliGRAM(s) IV Push daily  polyethylene glycol 3350 17 Gram(s) Oral daily  potassium chloride    Tablet ER 40 milliEquivalent(s) Oral every 4 hours  senna 2 Tablet(s) Oral at bedtime  venlafaxine XR. 150 milliGRAM(s) Oral daily    MEDICATIONS  (PRN):  acetaminophen     Tablet .. 650 milliGRAM(s) Oral every 6 hours PRN Temp greater or equal to 38C (100.4F), Mild Pain (1 - 3)  aluminum hydroxide/magnesium hydroxide/simethicone Suspension 30 milliLiter(s) Oral every 4 hours PRN Dyspepsia  dextrose Oral Gel 15 Gram(s) Oral once PRN Blood Glucose LESS THAN 70 milliGRAM(s)/deciliter  melatonin 3 milliGRAM(s) Oral at bedtime PRN Insomnia  ondansetron Injectable 4 milliGRAM(s) IV Push every 8 hours PRN Nausea and/or Vomiting    T(C): 36.7 (22 @ 13:29), Max: 36.8 (22 @ 20:30)  HR: 56 (22 @ 13:29) (56 - 76)  BP: 136/77 (22 @ 13:29) (111/58 - 164/81)  RR: 16 (22 @ 13:29) (16 - 18)  SpO2: 96% (22 @ 13:29) (93% - 97%)  Wt(kg): --  I&O's Detail    31 Mar 2022 07:01  -  2022 07:00  --------------------------------------------------------  IN:    Oral Fluid: 240 mL  Total IN: 240 mL    OUT:    Voided (mL): 300 mL  Total OUT: 300 mL    Total NET: -60 mL          PHYSICAL EXAM:  General: No distress  Respiratory: b/l air entry  Cardiovascular: S1 S2  Gastrointestinal: soft  Extremities:  edema                              7.6    4.62  )-----------( 161      ( 2022 08:02 )             22.3     04    144  |  108  |  38<H>  ----------------------------<  117<H>  3.3<L>   |  24  |  2.10<H>    Ca    8.9      2022 08:02  Phos  3.6     -  Mg     2.3     -    TPro  5.2<L>  /  Alb  2.9<L>  /  TBili  0.3  /  DBili  x   /  AST  11<L>  /  ALT  13  /  AlkPhos  49  04-        LIVER FUNCTIONS - ( 2022 08:02 )  Alb: 2.9 g/dL / Pro: 5.2 g/dL / ALK PHOS: 49 U/L / ALT: 13 U/L / AST: 11 U/L / GGT: x           Urinalysis Basic - ( 31 Mar 2022 05:27 )    Color: Pale Yellow / Appearance: Clear / S.015 / pH: x  Gluc: x / Ketone: Negative  / Bili: Negative / Urobili: Negative   Blood: x / Protein: 30 mg/dL / Nitrite: Negative   Leuk Esterase: Trace / RBC: 0-2 /HPF / WBC 3-5   Sq Epi: x / Non Sq Epi: Occasional / Bacteria: x        Sodium, Serum: 144 ( @ 08:02)  Sodium, Serum: 140 ( @ 08:09)  Sodium, Serum: 139 ( @ 10:49)    Creatinine, Serum: 2.10 ( @ 08:02)  Creatinine, Serum: 2.30 ( @ 08:09)  Creatinine, Serum: 2.30 ( @ 10:49)    Potassium, Serum: 3.3 ( @ 08:02)  Potassium, Serum: 3.3 ( @ 08:09)  Potassium, Serum: 3.8 ( @ 10:49)    Hemoglobin: 7.6 ( @ 08:02)  Hemoglobin: 8.2 ( @ 18:30)  Hemoglobin: 7.1 ( @ 08:09)  Hemoglobin: 9.2 ( @ 10:49)

## 2022-04-01 NOTE — PROGRESS NOTE ADULT - NSPROGADDITIONALINFOA_GEN_ALL_CORE
s/p colonoscopy    old blood noted in the left colon  multiple diverticulum from proximal transverse to sigmoid  no active bleeding  poor prep with large clot burden, prep was not adequate to evaluate small lesions or polyps    rec:   advance diet as janis  monitor cbc   keep hgb >8  if bleeding resumes will need nm bleeding scan and surgical eval

## 2022-04-01 NOTE — PROGRESS NOTE ADULT - PROBLEM SELECTOR PLAN 1
Patient presents with abdominal pain, rectal bleeding, fatigue likely 2/2 diverticular bleed  - H/H 9.2 on admission, baseline hemoglobin ~10 HOLD Eliquis   - Hb 7.6 this am; s/p 2 unit of prbc 01/31; will give another unit this am.   - CT a/p: diverticulosis without diverticulitis  - clear liquid diet    - Protonix 40 mg qD  - Hold antiplatelets, NSAIDs at this time,   - SCDs, hold pharmacologic DVT ppx  - Currently hemodynamically stable, monitor for signs and symptoms of active bleeding  - Consider transfusion for hemoglobin <8, to receive 2 units PRBCs today   - B12, folate wnl; iron panel w low serum iron   - GI (Dr. Bah) consulted, f/u recs, colonoscopy today Patient presents with abdominal pain, rectal bleeding, fatigue likely 2/2 diverticular bleed  - H/H 9.2 on admission, baseline hemoglobin ~10 HOLD Eliquis   - Hb 7.6 this am; s/p 2 unit of prbc 01/31; 1 u pRBC today   - CT a/p: diverticulosis without diverticulitis  - clear liquid diet    - Protonix 40 mg qD  - Hold antiplatelets, NSAIDs   - B12, folate wnl; iron panel w low serum iron   - GI (Dr. Bah) consulted, f/u recs, colonoscopy today

## 2022-04-01 NOTE — PROGRESS NOTE ADULT - PROBLEM SELECTOR PLAN 2
Likely Ac Blood loss anemia in the setting of GIB- AC on chronic anemia  - H/H 9.2 on admission, baseline hemoglobin ~10   - Hb 7.6 this am; s/p 2 unit of prbc 01/31; will give another unit this am.   - Consider transfusion for hemoglobin <8   - B12, folate wnl; iron panel w low serum iron   - folate, vitamin B12, TSH wnl   - F/u AM CBC  - Heme-onc consult Jennifer: replete fe stores with IV fe Likely Ac Blood loss anemia in the setting of GIB- AC on chronic anemia-1 u pRBC today  - H/H 9.2 on admission, baseline hemoglobin ~10   - Hb 7.6 this am; s/p 2 unit of prbc 01/31; will give another unit this am.   - Consider transfusion for hemoglobin <8   - B12, folate wnl; iron panel w low serum iron   - folate, vitamin B12, TSH wnl   - F/u AM CBC  - Heme-onc consult Jennifer: replete fe stores with IV fe

## 2022-04-01 NOTE — PROGRESS NOTE ADULT - SUBJECTIVE AND OBJECTIVE BOX
[facility] -- Fifi Bliss, Bonifacio, Kalpesh, Abraham Sheridan Savella  Office # [phone]      Follow Up:    GIB   Subjective/Observations:    No complaints of chest pain, dyspnea, or palpitations reported. No signs of orthopnea or PND.  still reports BRBPR, PRBC infusing     REVIEW OF SYSTEMS: All other review of systems is negative unless indicated above    PAST MEDICAL & SURGICAL HISTORY:  HTN (hypertension)  c/b multiple episodes of hypertensive urgency    HLD (hyperlipidemia)    Atrial fibrillation  first documented on EKG 10/7/2021    CAD (coronary artery disease)  s/p stents (not on plavix)    Type 2 diabetes mellitus  not on home insulin/ Meds    Anxiety  Depression  multiple psych medications    History of diverticulitis  07/2021    Diverticulosis  c/b GIB in 2020    Afib  on AC    Stage 3 chronic kidney disease    Anemia of chronic disease  Monoclonal Gammopathy-MGUS  pRBC transfusion 10/15/21    Chronic diastolic congestive heart failure    Multiple thyroid nodules    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  Had surgery ( April 2013 )    S/P tonsillectomy    S/P arthroscopic knee surgery  Bilateral ( 2005 )    Torsion of testicle  Had surgery at age 14    Pilonidal cyst  Had surgery ( 1969 )    S/P cataract surgery  Bilateral    H/O hernia repair        MEDICATIONS  (STANDING):  amLODIPine   Tablet 10 milliGRAM(s) Oral daily  atorvastatin 10 milliGRAM(s) Oral at bedtime  budesonide 160 MICROgram(s)/formoterol 4.5 MICROgram(s) Inhaler 2 Puff(s) Inhalation two times a day  carvedilol 6.25 milliGRAM(s) Oral every 12 hours  cloNIDine 0.2 milliGRAM(s) Oral three times a day  cyanocobalamin 1000 MICROGram(s) Oral daily  dextrose 5%. 1000 milliLiter(s) (50 mL/Hr) IV Continuous <Continuous>  dextrose 5%. 1000 milliLiter(s) (100 mL/Hr) IV Continuous <Continuous>  dextrose 50% Injectable 25 Gram(s) IV Push once  dextrose 50% Injectable 12.5 Gram(s) IV Push once  dextrose 50% Injectable 25 Gram(s) IV Push once  doxazosin 4 milliGRAM(s) Oral <User Schedule>  folic acid 1 milliGRAM(s) Oral daily  furosemide    Tablet 60 milliGRAM(s) Oral two times a day  glucagon  Injectable 1 milliGRAM(s) IntraMuscular once  hydrALAZINE 100 milliGRAM(s) Oral three times a day  insulin lispro (ADMELOG) corrective regimen sliding scale   SubCutaneous three times a day before meals  insulin lispro (ADMELOG) corrective regimen sliding scale   SubCutaneous at bedtime  isosorbide   mononitrate ER Tablet (IMDUR) 90 milliGRAM(s) Oral daily  lamoTRIgine 100 milliGRAM(s) Oral daily  oxybutynin XL 15 milliGRAM(s) Oral daily  pantoprazole  Injectable 40 milliGRAM(s) IV Push daily  polyethylene glycol 3350 17 Gram(s) Oral daily  potassium chloride    Tablet ER 40 milliEquivalent(s) Oral every 4 hours  senna 2 Tablet(s) Oral at bedtime  venlafaxine XR. 150 milliGRAM(s) Oral daily    MEDICATIONS  (PRN):  acetaminophen     Tablet .. 650 milliGRAM(s) Oral every 6 hours PRN Temp greater or equal to 38C (100.4F), Mild Pain (1 - 3)  aluminum hydroxide/magnesium hydroxide/simethicone Suspension 30 milliLiter(s) Oral every 4 hours PRN Dyspepsia  dextrose Oral Gel 15 Gram(s) Oral once PRN Blood Glucose LESS THAN 70 milliGRAM(s)/deciliter  melatonin 3 milliGRAM(s) Oral at bedtime PRN Insomnia  ondansetron Injectable 4 milliGRAM(s) IV Push every 8 hours PRN Nausea and/or Vomiting      Allergies    No Known Allergies    Intolerances        Vital Signs Last 24 Hrs  T(C): 36.6 (01 Apr 2022 08:42), Max: 36.8 (31 Mar 2022 20:30)  T(F): 97.8 (01 Apr 2022 08:42), Max: 98.3 (31 Mar 2022 20:30)  HR: 58 (01 Apr 2022 08:42) (58 - 70)  BP: 149/74 (01 Apr 2022 08:42) (111/58 - 158/73)  BP(mean): --  RR: 18 (01 Apr 2022 08:42) (17 - 18)  SpO2: 94% (01 Apr 2022 08:42) (93% - 95%)    I&O's Summary    31 Mar 2022 07:01  -  01 Apr 2022 07:00  --------------------------------------------------------  IN: 240 mL / OUT: 300 mL / NET: -60 mL          PHYSICAL EXAM:  TELE: not on tele  Constitutional: NAD, awake and alert, obese, pale   HEENT: Moist Mucous Membranes, Anicteric  Pulmonary: Non-labored, breath sounds are clear bilaterally, No wheezing, crackles or rhonchi  Cardiovascular: Regular, S1 and S2 nl, No murmurs, rubs, gallops or clicks  Gastrointestinal: Bowel Sounds present, soft, nontender.   Lymph: chronic bl  peripheral edema.  Skin: No visible rashes or ulcers.  Psych:  Mood & affect appropriate    LABS: All Labs Reviewed:                        7.6    4.62  )-----------( 161      ( 01 Apr 2022 08:02 )             22.3                         8.2    x     )-----------( x        ( 31 Mar 2022 18:30 )             24.0                         7.1    5.14  )-----------( 200      ( 31 Mar 2022 08:09 )             21.5     01 Apr 2022 08:02    144    |  108    |  38     ----------------------------<  117    3.3     |  24     |  2.10   31 Mar 2022 08:09    140    |  105    |  46     ----------------------------<  120    3.3     |  26     |  2.30   30 Mar 2022 10:49    139    |  106    |  48     ----------------------------<  127    3.8     |  25     |  2.30     Ca    8.9        01 Apr 2022 08:02  Ca    8.9        31 Mar 2022 08:09  Ca    9.6        30 Mar 2022 10:49  Phos  3.6       01 Apr 2022 08:02  Mg     2.3       01 Apr 2022 08:02    TPro  5.2    /  Alb  2.9    /  TBili  0.3    /  DBili  x      /  AST  11     /  ALT  13     /  AlkPhos  49     01 Apr 2022 08:02  TPro  5.5    /  Alb  2.9    /  TBili  0.2    /  DBili  x      /  AST  10     /  ALT  13     /  AlkPhos  51     31 Mar 2022 08:09  TPro  6.3    /  Alb  3.3    /  TBili  0.2    /  DBili  x      /  AST  14     /  ALT  15     /  AlkPhos  62     30 Mar 2022 10:49    PT/INR - ( 31 Mar 2022 16:46 )   PT: 17.7 sec;   INR: 1.51 ratio         PTT - ( 30 Mar 2022 10:49 )  PTT:41.9 sec      EKG:  < from: 12 Lead ECG (02.05.22 @ 10:34) >  Ventricular Rate 64 BPM    Atrial Rate 64 BPM    P-R Interval 320 ms    QRS Duration 112 ms    Q-T Interval 444 ms    QTC Calculation(Bazett) 458 ms    P Axis 16 degrees    R Axis 2 degrees    T Axis -18 degrees    Diagnosis Line Sinus rhythm with 1st degree AV block  Incomplete right bundle branch block  Nonspecific ST and T wave abnormality  Abnormal ECG  When compared with ECG of 21-JAN-2022 09:46,  Sinus rhythm has replaced Atrial fibrillation  < from: 12 Lead ECG (03.30.22 @ 13:01) >  Ventricular Rate 60 BPM    Atrial Rate 60 BPM    P-R Interval 268 ms    QRS Duration 114 ms    Q-T Interval 456 ms    QTC Calculation(Bazett) 456 ms    R Axis -11 degrees    T Axis 214 degrees    Diagnosis Line Sinus rhythm with 1st degree AV block  Incomplete right bundle branch block  Minimal voltage criteria for LVH, may be normal variant ( [employer] product )  ST & T wave abnormality, consider lateral ischemia  Abnormal ECG  When compared with ECG of 05-FEB-2022 10:34,  Inverted T waves have replaced nonspecific T wave abnormality in Lateral leads    < end of copied text >

## 2022-04-01 NOTE — PROGRESS NOTE ADULT - SUBJECTIVE AND OBJECTIVE BOX
===[INTERVAL HX: ]===  Patient seen and examined;  Chart reviewed and events noted;       No CP, no SOB  no diarrhea  no nausea    Feels unwell, "lousy"  had Cp yesterday.   had BRBPR this AM    planned colonoscopy      ===[HEALTH ISSUES]===      HEALTH ISSUES - PROBLEM Dx:  GIB (gastrointestinal bleeding)    Atrial fibrillation    CAD (coronary artery disease)    Chronic CHF    Anxiety and depression    Stage 3 chronic kidney disease    Type 2 diabetes mellitus    HTN (hypertension)    HLD (hyperlipidemia)    Abnormal finding on lung imaging    DVT prophylaxis    Anemia                ===[MEDICATIONS]===  MEDICATIONS  (STANDING):  amLODIPine   Tablet 10 milliGRAM(s) Oral daily  atorvastatin 10 milliGRAM(s) Oral at bedtime  budesonide 160 MICROgram(s)/formoterol 4.5 MICROgram(s) Inhaler 2 Puff(s) Inhalation two times a day  carvedilol 6.25 milliGRAM(s) Oral every 12 hours  cloNIDine 0.2 milliGRAM(s) Oral three times a day  cyanocobalamin 1000 MICROGram(s) Oral daily  dextrose 5%. 1000 milliLiter(s) (50 mL/Hr) IV Continuous <Continuous>  dextrose 5%. 1000 milliLiter(s) (100 mL/Hr) IV Continuous <Continuous>  dextrose 50% Injectable 25 Gram(s) IV Push once  dextrose 50% Injectable 12.5 Gram(s) IV Push once  dextrose 50% Injectable 25 Gram(s) IV Push once  doxazosin 4 milliGRAM(s) Oral <User Schedule>  folic acid 1 milliGRAM(s) Oral daily  furosemide    Tablet 60 milliGRAM(s) Oral two times a day  glucagon  Injectable 1 milliGRAM(s) IntraMuscular once  hydrALAZINE 100 milliGRAM(s) Oral three times a day  insulin lispro (ADMELOG) corrective regimen sliding scale   SubCutaneous three times a day before meals  insulin lispro (ADMELOG) corrective regimen sliding scale   SubCutaneous at bedtime  isosorbide   mononitrate ER Tablet (IMDUR) 90 milliGRAM(s) Oral daily  lamoTRIgine 100 milliGRAM(s) Oral daily  oxybutynin XL 15 milliGRAM(s) Oral daily  pantoprazole  Injectable 40 milliGRAM(s) IV Push daily  polyethylene glycol 3350 17 Gram(s) Oral daily  potassium chloride    Tablet ER 40 milliEquivalent(s) Oral every 4 hours  senna 2 Tablet(s) Oral at bedtime  venlafaxine XR. 150 milliGRAM(s) Oral daily    MEDICATIONS  (PRN):  acetaminophen     Tablet .. 650 milliGRAM(s) Oral every 6 hours PRN Temp greater or equal to 38C (100.4F), Mild Pain (1 - 3)  aluminum hydroxide/magnesium hydroxide/simethicone Suspension 30 milliLiter(s) Oral every 4 hours PRN Dyspepsia  dextrose Oral Gel 15 Gram(s) Oral once PRN Blood Glucose LESS THAN 70 milliGRAM(s)/deciliter  melatonin 3 milliGRAM(s) Oral at bedtime PRN Insomnia  ondansetron Injectable 4 milliGRAM(s) IV Push every 8 hours PRN Nausea and/or Vomiting      ===[VITALS]===  Vital Signs Last 24 Hrs  T(C): 36.4 (2022 10:51), Max: 36.8 (31 Mar 2022 20:30)  T(F): 97.6 (2022 10:51), Max: 98.3 (31 Mar 2022 20:30)  HR: 57 (2022 10:51) (57 - 70)  BP: 164/81 (2022 10:51) (111/58 - 164/81)  BP(mean): --  RR: 18 (2022 10:51) (17 - 18)  SpO2: 95% (2022 10:51) (93% - 95%)  [WT/HT]  Daily     Daily   [VENT]    ===[PHYSICAL EXAM]===  General: WN,  WD adult in NAD.  HEENT:  anicteric sclera, pink conjunctivae, xxxxx clear oropharynx  Neck: supple, no masses.  CV: normal S1S2, no murmur, no rubs, no gallops.  Lungs: clear to auscultation, no wheezes, no rales, no rhonchi.  Abdomen: soft, non-tender, non-distended, no hepatosplenomegaly, normal BS, no guarding.  Ext: no clubbing, no cyanosis, no edema.  Skin: no rashes,  no petechiae, no venous stasis changes.    Neuro: alert and oriented X xxxx, no focal motor deficits.  LN: no SC FE.        ===[LABS:]===                        7.6    4.62  )-----------( 161      ( 2022 08:02 )             22.3     04-    144  |  108  |  38<H>  ----------------------------<  117<H>  3.3<L>   |  24  |  2.10<H>    Ca    8.9      2022 08:02  Phos  3.6     04-  Mg     2.3     04-    TPro  5.2<L>  /  Alb  2.9<L>  /  TBili  0.3  /  DBili  x   /  AST  11<L>  /  ALT  13  /  AlkPhos  49  -    PT/INR - ( 31 Mar 2022 16:46 )   PT: 17.7 sec;   INR: 1.51 ratio               Iron - Total Binding Capacity.: 230 ug/dL (22 @ 11:49)  Ferritin, Serum: 40 ng/mL (22 @ 11:49)  Folate, Serum: >20.0 ng/mL (22 @ 11:49)  Vitamin B12, Serum: 1442 pg/mL (22 @ 11:49)    Urinalysis Basic - ( 31 Mar 2022 05:27 )    Color: Pale Yellow / Appearance: Clear / S.015 / pH: x  Gluc: x / Ketone: Negative  / Bili: Negative / Urobili: Negative   Blood: x / Protein: 30 mg/dL / Nitrite: Negative   Leuk Esterase: Trace / RBC: 0-2 /HPF / WBC 3-5   Sq Epi: x / Non Sq Epi: Occasional / Bacteria: x        COVID-19 PCR: NotDetec (30 Mar 2022 10:49)  COVID-19 PCR: NotDetec (2022 10:51)  COVID-19 PCR: Detected (2022 07:46)  SARS-CoV-2: Detected (2022 10:07)  COVID-19 PCR: Detected (2022 09:16)  COVID-19 PCR: Detected (15 [place] 2022 07:40)  COVID-19 PCR: Detected (2022 07:52)  COVID-19 PCR: Detected (10 [place] 2022 07:47)  COVID-19 PCR: NotDetec (27 Oct 2021 12:20)  COVID-19 PCR: NotDetec (24 Oct 2021 07:04)  SARS-CoV-2: NotDetec (18 Oct 2021 11:45)  COVID-19 PCR: NotDetec (13 Oct 2021 04:42)  SARS-CoV-2: NotDetec (06 Oct 2021 21:49)              ===[RADIOLOGY STUDIES:]===

## 2022-04-01 NOTE — PROGRESS NOTE ADULT - PROBLEM SELECTOR PLAN 7
Chronic, stable   - Patient's creatinine 2.3, near baseline of 2.2   - Patients GFR 29, near baseline   - Avoid nephrotoxic medications as much as possible  - Nephro: Cayden galeana

## 2022-04-01 NOTE — PROGRESS NOTE ADULT - SUBJECTIVE AND OBJECTIVE BOX
Patient is a 77y old  Male who presents with a chief complaint of Rectal bleed (31 Mar 2022 21:50)      HPI:  76 yo M PMH of A fib (on Eliquis) , 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 Patient would have another episode of dark stool in the ED. Patient denied consuming any beets or magnesium/calcium supplements recently. Patient states that onset of stool was very sudden. Patient recently admitted to [facility] in January for CHF exacerbation, improved w/ diuresis. Patient was seen and examined at bedside, denied any headache, cp/pressure, palpitations, myalgias, fevers/chills, SOB, hematuria, dysuria. Patient endorses lightheadedness/dizziness, 4/10 non-radiating sharp periumbilical pain, and chronic knee pain.     In ED:   Vitals: 97.5F, HR 69, 133/81, RR 18, 96% on RA   Labs significant for: Occult blood +, GFR 29, INR 2.01, Creatinine 2.3 (baseline 2.2)  Imaging: CT chest: Few 2mm nodules, coronary artery calcification  CT abd/pelvis: diverticulosis w/o diverticulitis   CT head: No acute intracranial hemorrhage or acute territorial infarct   EKG: Sinus rhythm w/ 1st degree heart block @ 60bpm, incomplete RBBB unchanged from prior ekg   Recieved: Pantoprazole infusion   (30 Mar 2022 15:40)      INTERVAL HPI:  3/31/2022: Patient had two more bowel movements with dark stool from yesterday. VSS. Patient seen and examined at bedside, denies any headache, cp/pressure/palpitations, abd pain, dysuria/hematuria, SOB, nausea/vomitting, fevers/chills. Patient continues to endorse lightheadedness/dizziness, and fatigue. AM hgb showed that patients hgb had dropped to 7.1 from 9.2, patient to receive 2 units of PRBC.  2022: Pt episode of mild hematochezia this am. States he still experiences lightheadedness with dizziness. Denies fever, chills, chest pain, abd pain, sob, palpitations, n/v/d/c. Pt w Hb 7.6 this am. Will give a unit of prbc.     OVERNIGHT EVENTS:    Home Medications:  Aristada 1064 mg/3.9 mL intramuscular suspension, extended release: 1 dose(s) intramuscular every 8 weeks (30 Mar 2022 15:58)  carvedilol 6.25 mg oral tablet: 1 tab(s) orally 2 times a day (30 Mar 2022 15:58)  cloNIDine 0.2 mg oral tablet: 1 tab(s) orally 3 times a day (30 Mar 2022 15:58)  diclofenac 1% topical gel: Apply topically to affected area 3 times a day (30 Mar 2022 15:58)  doxazosin 4 mg oral tablet: 1 tab(s) orally 2 times a day (30 Mar 2022 15:58)  furosemide 20 mg oral tablet: 3 tab(s) orally 2 times a day (30 Mar 2022 15:58)  lactobacillus acidophilus oral capsule: 1 cap(s) orally 2 times a day (30 Mar 2022 15:58)  lamoTRIgine 100 mg oral tablet: 1 tab(s) orally once a day (30 Mar 2022 15:58)  oxybutynin 15 mg/24 hr oral tablet, extended release: 1 tab(s) orally once a day (30 Mar 2022 15:58)  Vitamin B-12 1000 mcg oral tablet: 1 tab(s) orally once a day (30 Mar 2022 15:58)      MEDICATIONS  (STANDING):  amLODIPine   Tablet 10 milliGRAM(s) Oral daily  atorvastatin 10 milliGRAM(s) Oral at bedtime  budesonide 160 MICROgram(s)/formoterol 4.5 MICROgram(s) Inhaler 2 Puff(s) Inhalation two times a day  carvedilol 6.25 milliGRAM(s) Oral every 12 hours  cloNIDine 0.2 milliGRAM(s) Oral three times a day  cyanocobalamin 1000 MICROGram(s) Oral daily  dextrose 5%. 1000 milliLiter(s) (50 mL/Hr) IV Continuous <Continuous>  dextrose 5%. 1000 milliLiter(s) (100 mL/Hr) IV Continuous <Continuous>  dextrose 50% Injectable 25 Gram(s) IV Push once  dextrose 50% Injectable 12.5 Gram(s) IV Push once  dextrose 50% Injectable 25 Gram(s) IV Push once  doxazosin 4 milliGRAM(s) Oral <User Schedule>  folic acid 1 milliGRAM(s) Oral daily  furosemide    Tablet 60 milliGRAM(s) Oral two times a day  glucagon  Injectable 1 milliGRAM(s) IntraMuscular once  hydrALAZINE 100 milliGRAM(s) Oral three times a day  insulin lispro (ADMELOG) corrective regimen sliding scale   SubCutaneous three times a day before meals  insulin lispro (ADMELOG) corrective regimen sliding scale   SubCutaneous at bedtime  isosorbide   mononitrate ER Tablet (IMDUR) 90 milliGRAM(s) Oral daily  lamoTRIgine 100 milliGRAM(s) Oral daily  oxybutynin XL 15 milliGRAM(s) Oral daily  pantoprazole  Injectable 40 milliGRAM(s) IV Push daily  polyethylene glycol 3350 17 Gram(s) Oral daily  senna 2 Tablet(s) Oral at bedtime  venlafaxine XR. 150 milliGRAM(s) Oral daily    MEDICATIONS  (PRN):  acetaminophen     Tablet .. 650 milliGRAM(s) Oral every 6 hours PRN Temp greater or equal to 38C (100.4F), Mild Pain (1 - 3)  aluminum hydroxide/magnesium hydroxide/simethicone Suspension 30 milliLiter(s) Oral every 4 hours PRN Dyspepsia  dextrose Oral Gel 15 Gram(s) Oral once PRN Blood Glucose LESS THAN 70 milliGRAM(s)/deciliter  melatonin 3 milliGRAM(s) Oral at bedtime PRN Insomnia  ondansetron Injectable 4 milliGRAM(s) IV Push every 8 hours PRN Nausea and/or Vomiting      No Known Allergies      Social History:  Lives in [facility]   ADL independent   Ambulates w/ a walker   Former smoker, quit 45 years ago, ~8 pack years   Alcohol denies   Drugs denies   COVID Moderna x2 (30 Mar 2022 15:40)      REVIEW OF SYSTEMS:  CONSTITUTIONAL: No fever, No chills, [ x ] fatigue, No myalgia, No Body ache, No Weakness  EYES: No eye pain,  No visual disturbances, No discharge, No Redness  ENMT: No ear pain, No nose bleed, No vertigo; No sinus pain, No throat pain, No Congestion  NECK: No pain, No stiffness  RESPIRATORY: No cough, No wheezing, No hemoptysis, No shortness of breath  CARDIOVASCULAR: No chest pain, No palpitations  GASTROINTESTINAL: No abdominal pain, No epigastric pain. No nausea, No vomiting, No diarrhea, No constipation; [ x ] BM - mild hematochezia this am.   GENITOURINARY: No dysuria, No frequency, No urgency, No hematuria, No incontinence  NEUROLOGICAL: No headaches, [ x ] dizziness, No numbness, No tingling, No tremors, No weakness  EXTREMITIES: No Swelling, No Pain, No Edema  SKIN: [x  ] No itching, burning, rashes, or lesions   MUSCULOSKELETAL: No joint pain, No joint swelling; No muscle pain, No back pain, No extremity pain  PSYCHIATRIC: No depression, No anxiety, No mood swings, No difficulty sleeping at night  PAIN SCALE: [ x ] None  [  ] Other-  ROS Unable to obtain due to: [  ] Dementia  [  ] Lethargy  [  ] Sedated  [  ] Non verbal  REST OF REVIEW OF SYSTEMS: [ x ] Normal      Vital Signs Last 24 Hrs  T(C): 36.5 (2022 04:52), Max: 36.8 (31 Mar 2022 20:30)  T(F): 97.7 (2022 04:52), Max: 98.3 (31 Mar 2022 20:30)  HR: 63 (2022 04:52) (63 - 70)  BP: 158/73 (2022 04:52) (111/58 - 158/73)  BP(mean): --  RR: 18 (2022 04:52) (17 - 18)  SpO2: 94% (2022 04:52) (93% - 95%)    CAPILLARY BLOOD GLUCOSE      POCT Blood Glucose.: 131 mg/dL (2022 07:41)  POCT Blood Glucose.: 136 mg/dL (2022 05:55)  POCT Blood Glucose.: 142 mg/dL (31 Mar 2022 21:49)  POCT Blood Glucose.: 149 mg/dL (31 Mar 2022 16:57)  POCT Blood Glucose.: 154 mg/dL (31 Mar 2022 11:32)      I&O's Summary    31 Mar 2022 07:01  -  2022 07:00  --------------------------------------------------------  IN: 240 mL / OUT: 300 mL / NET: -60 mL      PHYSICAL EXAM:  GENERAL:  [ x ] NAD, [ x ] Well appearing, [  ] Agitated, [  ] Drowsy, [  ] Lethargy, [  ] Confused   HEAD:  [ x ] Normal, [  ] Other  EYES:  [ x ] EOMI, [ x ] PERRLA, [x  ] Conjunctiva and sclera clear normal, [ x ] Other - Right conjunctiva with lesion in LLQ, [ x ] Pallor, [  ] Discharge  ENMT:  [ x ] Normal, [ x ] Moist mucous membranes, [ x ] Good dentition, [  ] No thrush  NECK:  [ x ] Supple, [ x ] No JVD, [ x ] Normal thyroid, [  ] Lymphadenopathy, [  ] Other  CHEST/LUNG:  [ x ] Clear to auscultation bilaterally, [ x ] Breath Sounds equal B/L, [  ] Poor effort, [ x ] No rales, [ x ] No rhonchi, [ x ] No wheezing  HEART:  [ x ] Regular rate and rhythm, [  ] Tachycardia, [  ] Bradycardia, [  ] Irregular, [ x ] 2/6 systolic murmur, No rubs, No gallops, [  ] PPM in place (Mfr:  )  ABDOMEN:  [ x ] Soft, [ x ] Nontender, [ x ] Nondistended, [ x ] No mass, [ x ] Bowel sounds present, [ x ] Obese  NERVOUS SYSTEM:  [ x ] Alert & Oriented x3, [ x ] Nonfocal, [  ] Confusion, [  ] Encephalopathic, [  ] Sedated, [  ] Unable to assess, [  ] Dementia, [  ] Other-  EXTREMITIES:  [ x ] 2+ Peripheral Pulses, No clubbing, No cyanosis,  [  ] Edema B/L lower EXT, [  ] PVD stasis skin changes B/L lower EXT, [  ] Wound  LYMPH:  No lymphadenopathy noted  SKIN:  [ x ] No rashes or lesions, [  ] Pressure ulcers, [  ] Ecchymosis, [  ] Skin tears, [  ] Other    DIET: Diet, NPO after Midnight:      NPO Start Date: 31-Mar-2022,   NPO Start Time: 23:59  Except Medications (22 @ 16:19)  Diet, Consistent Carbohydrate Clear Liquid (22 @ 16:58)      LABS:                        7.6    4.62  )-----------( 161      ( 2022 08:02 )             22.3       Ca    8.9        31 Mar 2022 08:09      PT/INR - ( 31 Mar 2022 16:46 )   PT: 17.7 sec;   INR: 1.51 ratio         PTT - ( 30 Mar 2022 10:49 )  PTT:41.9 sec  Urinalysis Basic - ( 31 Mar 2022 05:27 )    Color: Pale Yellow / Appearance: Clear / S.015 / pH: x  Gluc: x / Ketone: Negative  / Bili: Negative / Urobili: Negative   Blood: x / Protein: 30 mg/dL / Nitrite: Negative   Leuk Esterase: Trace / RBC: 0-2 /HPF / WBC 3-5   Sq Epi: x / Non Sq Epi: Occasional / Bacteria: x                 Anemia Panel:  Iron Total, Serum: 33 ug/dL (22 @ 11:49)  Iron - Total Binding Capacity.: 230 ug/dL (22 @ 11:49)  Ferritin, Serum: 40 ng/mL (22 @ 11:49)  Folate, Serum: >20.0 ng/mL (22 @ 11:49)  Vitamin B12, Serum: 1442 pg/mL (22 @ 11:49)      Thyroid Panel:  Thyroid Stimulating Hormone, Serum: 1.19 uIU/mL (22 @ 08:09)                RADIOLOGY & ADDITIONAL TESTS:      HEALTH ISSUES - PROBLEM Dx:  GIB (gastrointestinal bleeding)    Atrial fibrillation    CAD (coronary artery disease)    Chronic CHF    Anxiety and depression    Stage 3 chronic kidney disease    Type 2 diabetes mellitus    HTN (hypertension)    HLD (hyperlipidemia)    Abnormal finding on lung imaging    DVT prophylaxis    Anemia          Consultant(s) Notes Reviewed:  [ x ] YES     Care Discussed with [ x ] Consultants, [ x ] Patient, [ x ] Family, [  ] HCP, [  ] , [  ] Social Service, [ x ] RN, [  ] Physical Therapy, [  ] Palliative Care Team  DVT PPX: [  ] Lovenox, [  ] SC Heparin, [  ] Coumadin, [  ] Xarelto, [  ] Eliquis, [  ] Pradaxa, [  ] IV Heparin drip, [ x ] SCD, [  ] Ambulation, [  ] Contraindicated 2/2 GI Bleed, [ x ] Contraindicated 2/2  Bleed, [  ] Contraindicated 2/2 Brain Bleed  Advanced Directive: [ x ] None, [  ] DNR/DNI Patient is a 77y old  Male who presents with a chief complaint of Rectal bleed (31 Mar 2022 21:50)      HPI:  78 yo M PMH of A fib (on Eliquis) , MGUS s/p PRBC transfusion 10/21, anxiety/depression, CAD s/p stents (not on plavix), BPH, CHF, Diverticulosis, HLD, HTN, thyroid nodules, CKD stage 3, DM2 presents to the ED from [facility] after having up to 3 bowel movements with dark/black stool. Patient would have another episode of dark stool in the ED. Patient denied consuming any beets or magnesium/calcium supplements recently. Patient states that onset of stool was very sudden. Patient recently admitted to [facility] in January for CHF exacerbation, improved w/ diuresis. Patient was seen and examined at bedside, denied any headache, cp/pressure, palpitations, myalgias, fevers/chills, SOB, hematuria, dysuria. Patient endorses lightheadedness/dizziness, 4/10 non-radiating sharp periumbilical pain, and chronic knee pain.     In ED:   Vitals: 97.5F, HR 69, 133/81, RR 18, 96% on RA   Labs significant for: Occult blood +, GFR 29, INR 2.01, Creatinine 2.3 (baseline 2.2)  Imaging: CT chest: Few 2mm nodules, coronary artery calcification  CT abd/pelvis: diverticulosis w/o diverticulitis   CT head: No acute intracranial hemorrhage or acute territorial infarct   EKG: Sinus rhythm w/ 1st degree heart block @ 60bpm, incomplete RBBB unchanged from prior ekg   Recieved: Pantoprazole infusion   (30 Mar 2022 15:40)      INTERVAL HPI:  3/31/2022: Patient had two more bowel movements with dark stool from yesterday. VSS. Patient seen and examined at bedside, denies any headache, cp/pressure/palpitations, abd pain, dysuria/hematuria, SOB, nausea/vomitting, fevers/chills. Patient continues to endorse lightheadedness/dizziness, and fatigue. AM hgb showed that patients hgb had dropped to 7.1 from 9.2, patient to receive 2 units of PRBC.  2022: Pt episode of mild hematochezia this am. States he still experiences lightheadedness with dizziness. Denies fever, chills, chest pain, abd pain, sob, palpitations, n/v/d/c. Pt w Hb 7.6 this am. Will give a unit of prbc.     OVERNIGHT EVENTS: None     Home Medications:  Aristada 1064 mg/3.9 mL intramuscular suspension, extended release: 1 dose(s) intramuscular every 8 weeks (30 Mar 2022 15:58)  carvedilol 6.25 mg oral tablet: 1 tab(s) orally 2 times a day (30 Mar 2022 15:58)  cloNIDine 0.2 mg oral tablet: 1 tab(s) orally 3 times a day (30 Mar 2022 15:58)  diclofenac 1% topical gel: Apply topically to affected area 3 times a day (30 Mar 2022 15:58)  doxazosin 4 mg oral tablet: 1 tab(s) orally 2 times a day (30 Mar 2022 15:58)  furosemide 20 mg oral tablet: 3 tab(s) orally 2 times a day (30 Mar 2022 15:58)  lactobacillus acidophilus oral capsule: 1 cap(s) orally 2 times a day (30 Mar 2022 15:58)  lamoTRIgine 100 mg oral tablet: 1 tab(s) orally once a day (30 Mar 2022 15:58)  oxybutynin 15 mg/24 hr oral tablet, extended release: 1 tab(s) orally once a day (30 Mar 2022 15:58)  Vitamin B-12 1000 mcg oral tablet: 1 tab(s) orally once a day (30 Mar 2022 15:58)      MEDICATIONS  (STANDING):  amLODIPine   Tablet 10 milliGRAM(s) Oral daily  atorvastatin 10 milliGRAM(s) Oral at bedtime  budesonide 160 MICROgram(s)/formoterol 4.5 MICROgram(s) Inhaler 2 Puff(s) Inhalation two times a day  carvedilol 6.25 milliGRAM(s) Oral every 12 hours  cloNIDine 0.2 milliGRAM(s) Oral three times a day  cyanocobalamin 1000 MICROGram(s) Oral daily  dextrose 5%. 1000 milliLiter(s) (50 mL/Hr) IV Continuous <Continuous>  dextrose 5%. 1000 milliLiter(s) (100 mL/Hr) IV Continuous <Continuous>  dextrose 50% Injectable 25 Gram(s) IV Push once  dextrose 50% Injectable 12.5 Gram(s) IV Push once  dextrose 50% Injectable 25 Gram(s) IV Push once  doxazosin 4 milliGRAM(s) Oral <User Schedule>  folic acid 1 milliGRAM(s) Oral daily  furosemide    Tablet 60 milliGRAM(s) Oral two times a day  glucagon  Injectable 1 milliGRAM(s) IntraMuscular once  hydrALAZINE 100 milliGRAM(s) Oral three times a day  insulin lispro (ADMELOG) corrective regimen sliding scale   SubCutaneous three times a day before meals  insulin lispro (ADMELOG) corrective regimen sliding scale   SubCutaneous at bedtime  isosorbide   mononitrate ER Tablet (IMDUR) 90 milliGRAM(s) Oral daily  lamoTRIgine 100 milliGRAM(s) Oral daily  oxybutynin XL 15 milliGRAM(s) Oral daily  pantoprazole  Injectable 40 milliGRAM(s) IV Push daily  polyethylene glycol 3350 17 Gram(s) Oral daily  senna 2 Tablet(s) Oral at bedtime  venlafaxine XR. 150 milliGRAM(s) Oral daily    MEDICATIONS  (PRN):  acetaminophen     Tablet .. 650 milliGRAM(s) Oral every 6 hours PRN Temp greater or equal to 38C (100.4F), Mild Pain (1 - 3)  aluminum hydroxide/magnesium hydroxide/simethicone Suspension 30 milliLiter(s) Oral every 4 hours PRN Dyspepsia  dextrose Oral Gel 15 Gram(s) Oral once PRN Blood Glucose LESS THAN 70 milliGRAM(s)/deciliter  melatonin 3 milliGRAM(s) Oral at bedtime PRN Insomnia  ondansetron Injectable 4 milliGRAM(s) IV Push every 8 hours PRN Nausea and/or Vomiting      No Known Allergies      Social History:  Lives in [facility]   ADL independent   Ambulates w/ a walker   Former smoker, quit 45 years ago, ~8 pack years   Alcohol denies   Drugs denies   COVID Moderna x2 (30 Mar 2022 15:40)      REVIEW OF SYSTEMS:  CONSTITUTIONAL: No fever, No chills, [ x ] fatigue, No myalgia, No Body ache, No Weakness  EYES: No eye pain,  No visual disturbances, No discharge, No Redness  ENMT: No ear pain, No nose bleed, No vertigo; No sinus pain, No throat pain, No Congestion  NECK: No pain, No stiffness  RESPIRATORY: No cough, No wheezing, No hemoptysis, No shortness of breath  CARDIOVASCULAR: No chest pain, No palpitations  GASTROINTESTINAL: No abdominal pain, No epigastric pain. No nausea, No vomiting, No diarrhea, No constipation; [ x ] BM - mild hematochezia this am.   GENITOURINARY: No dysuria, No frequency, No urgency, No hematuria, No incontinence  NEUROLOGICAL: No headaches, [ x ] dizziness, No numbness, No tingling, No tremors, No weakness  EXTREMITIES: No Swelling, No Pain, No Edema  SKIN: [x  ] No itching, burning, rashes, or lesions   MUSCULOSKELETAL: No joint pain, No joint swelling; No muscle pain, No back pain, No extremity pain  PSYCHIATRIC: No depression, No anxiety, No mood swings, No difficulty sleeping at night  PAIN SCALE: [ x ] None  [  ] Other-  ROS Unable to obtain due to: [  ] Dementia  [  ] Lethargy  [  ] Sedated  [  ] Non verbal  REST OF REVIEW OF SYSTEMS: [ x ] Normal      Vital Signs Last 24 Hrs  T(C): 36.5 (2022 04:52), Max: 36.8 (31 Mar 2022 20:30)  T(F): 97.7 (2022 04:52), Max: 98.3 (31 Mar 2022 20:30)  HR: 63 (2022 04:52) (63 - 70)  BP: 158/73 (2022 04:52) (111/58 - 158/73)  BP(mean): --  RR: 18 (2022 04:52) (17 - 18)  SpO2: 94% (2022 04:52) (93% - 95%)    CAPILLARY BLOOD GLUCOSE      POCT Blood Glucose.: 131 mg/dL (2022 07:41)  POCT Blood Glucose.: 136 mg/dL (2022 05:55)  POCT Blood Glucose.: 142 mg/dL (31 Mar 2022 21:49)  POCT Blood Glucose.: 149 mg/dL (31 Mar 2022 16:57)  POCT Blood Glucose.: 154 mg/dL (31 Mar 2022 11:32)      I&O's Summary    31 Mar 2022 07:01  -  2022 07:00  --------------------------------------------------------  IN: 240 mL / OUT: 300 mL / NET: -60 mL      PHYSICAL EXAM:  GENERAL:  [ x ] NAD, [ x ] Well appearing, [  ] Agitated, [  ] Drowsy, [  ] Lethargy, [  ] Confused   HEAD:  [ x ] Normal, [  ] Other  EYES:  [ x ] EOMI, [ x ] PERRLA, [x  ] Conjunctiva and sclera clear normal, [ x ] Other - Right conjunctiva with lesion in LLQ, [ x ] Pallor, [  ] Discharge  ENMT:  [ x ] Normal, [ x ] Moist mucous membranes, [ x ] Good dentition, [  ] No thrush  NECK:  [ x ] Supple, [ x ] No JVD, [ x ] Normal thyroid, [  ] Lymphadenopathy, [  ] Other  CHEST/LUNG:  [ x ] Clear to auscultation bilaterally, [ x ] Breath Sounds equal B/L, [  ] Poor effort, [ x ] No rales, [ x ] No rhonchi, [ x ] No wheezing  HEART:  [ x ] Regular rate and rhythm, [  ] Tachycardia, [  ] Bradycardia, [  ] Irregular, [ x ] 2/6 systolic murmur, No rubs, No gallops, [  ] PPM in place (Mfr:  )  ABDOMEN:  [ x ] Soft, [ x ] Nontender, [ x ] Nondistended, [ x ] No mass, [ x ] Bowel sounds present, [ x ] Obese  NERVOUS SYSTEM:  [ x ] Alert & Oriented x3, [ x ] Nonfocal, [  ] Confusion, [  ] Encephalopathic, [  ] Sedated, [  ] Unable to assess, [  ] Dementia, [  ] Other-  EXTREMITIES:  [ x ] 2+ Peripheral Pulses, No clubbing, No cyanosis,  [  ] Edema B/L lower EXT, [  ] PVD stasis skin changes B/L lower EXT, [  ] Wound  LYMPH:  No lymphadenopathy noted  SKIN:  [ x ] No rashes or lesions, [  ] Pressure ulcers, [  ] Ecchymosis, [  ] Skin tears, [  ] Other    DIET: Diet, NPO after Midnight:      NPO Start Date: 31-Mar-2022,   NPO Start Time: 23:59  Except Medications (22 @ 16:19)  Diet, Consistent Carbohydrate Clear Liquid (22 @ 16:58)      LABS:                        7.6    4.62  )-----------( 161      ( 2022 08:02 )             22.3       Ca    8.9        31 Mar 2022 08:09      PT/INR - ( 31 Mar 2022 16:46 )   PT: 17.7 sec;   INR: 1.51 ratio         PTT - ( 30 Mar 2022 10:49 )  PTT:41.9 sec  Urinalysis Basic - ( 31 Mar 2022 05:27 )    Color: Pale Yellow / Appearance: Clear / S.015 / pH: x  Gluc: x / Ketone: Negative  / Bili: Negative / Urobili: Negative   Blood: x / Protein: 30 mg/dL / Nitrite: Negative   Leuk Esterase: Trace / RBC: 0-2 /HPF / WBC 3-5   Sq Epi: x / Non Sq Epi: Occasional / Bacteria: x                 Anemia Panel:  Iron Total, Serum: 33 ug/dL (22 @ 11:49)  Iron - Total Binding Capacity.: 230 ug/dL (22 @ 11:49)  Ferritin, Serum: 40 ng/mL (22 @ 11:49)  Folate, Serum: >20.0 ng/mL (22 @ 11:49)  Vitamin B12, Serum: 1442 pg/mL (22 @ 11:49)      Thyroid Panel:  Thyroid Stimulating Hormone, Serum: 1.19 uIU/mL (22 @ 08:09)                RADIOLOGY & ADDITIONAL TESTS:      HEALTH ISSUES - PROBLEM Dx:  GIB (gastrointestinal bleeding)    Atrial fibrillation    CAD (coronary artery disease)    Chronic CHF    Anxiety and depression    Stage 3 chronic kidney disease    Type 2 diabetes mellitus    HTN (hypertension)    HLD (hyperlipidemia)    Abnormal finding on lung imaging    DVT prophylaxis    Anemia          Consultant(s) Notes Reviewed:  [ x ] YES     Care Discussed with [ x ] Consultants, [ x ] Patient, [ x ] Family, [  ] HCP, [  ] , [  ] Social Service, [ x ] RN, [  ] Physical Therapy, [  ] Palliative Care Team  DVT PPX: [  ] Lovenox, [  ] SC Heparin, [  ] Coumadin, [  ] Xarelto, [  ] Eliquis, [  ] Pradaxa, [  ] IV Heparin drip, [ x ] SCD, [  ] Ambulation, [  ] Contraindicated 2/2 GI Bleed, [ x ] Contraindicated 2/2  Bleed, [  ] Contraindicated 2/2 Brain Bleed  Advanced Directive: [ x ] None, [  ] DNR/DNI Patient is a 77y old  Male who presents with a chief complaint of Rectal bleed (31 Mar 2022 21:50)      HPI:  78 yo M PMH of A fib (on Eliquis) , 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 Patient would have another episode of dark stool in the ED. Patient denied consuming any beets or magnesium/calcium supplements recently. Patient states that onset of stool was very sudden. Patient recently admitted to [facility] in January for CHF exacerbation, improved w/ diuresis. Patient was seen and examined at bedside, denied any headache, cp/pressure, palpitations, myalgias, fevers/chills, SOB, hematuria, dysuria. Patient endorses lightheadedness/dizziness, 4/10 non-radiating sharp periumbilical pain, and chronic knee pain.     In ED:   Vitals: 97.5F, HR 69, 133/81, RR 18, 96% on RA   Labs significant for: Occult blood +, GFR 29, INR 2.01, Creatinine 2.3 (baseline 2.2)  Imaging: CT chest: Few 2mm nodules, coronary artery calcification  CT abd/pelvis: diverticulosis w/o diverticulitis   CT head: No acute intracranial hemorrhage or acute territorial infarct   EKG: Sinus rhythm w/ 1st degree heart block @ 60bpm, incomplete RBBB unchanged from prior ekg   Recieved: Pantoprazole infusion   (30 Mar 2022 15:40)      INTERVAL HPI:  3/31/2022: Patient had two more bowel movements with dark stool from yesterday. VSS. Patient seen and examined at bedside, denies any headache, cp/pressure/palpitations, abd pain, dysuria/hematuria, SOB, nausea/vomitting, fevers/chills. Patient continues to endorse lightheadedness/dizziness, and fatigue. AM hgb showed that patients hgb had dropped to 7.1 from 9.2, patient to receive 2 units of PRBC.  2022: Pt episode of mild hematochezia this am. States he still experiences lightheadedness with dizziness. Denies fever, chills, chest pain, abd pain, sob, palpitations, n/v/d/c. Pt w Hb 7.6 this am. Will give a unit of prbc. NPO for colonoscopy.    OVERNIGHT EVENTS: None     Home Medications:  Aristada 1064 mg/3.9 mL intramuscular suspension, extended release: 1 dose(s) intramuscular every 8 weeks (30 Mar 2022 15:58)  carvedilol 6.25 mg oral tablet: 1 tab(s) orally 2 times a day (30 Mar 2022 15:58)  cloNIDine 0.2 mg oral tablet: 1 tab(s) orally 3 times a day (30 Mar 2022 15:58)  diclofenac 1% topical gel: Apply topically to affected area 3 times a day (30 Mar 2022 15:58)  doxazosin 4 mg oral tablet: 1 tab(s) orally 2 times a day (30 Mar 2022 15:58)  furosemide 20 mg oral tablet: 3 tab(s) orally 2 times a day (30 Mar 2022 15:58)  lactobacillus acidophilus oral capsule: 1 cap(s) orally 2 times a day (30 Mar 2022 15:58)  lamoTRIgine 100 mg oral tablet: 1 tab(s) orally once a day (30 Mar 2022 15:58)  oxybutynin 15 mg/24 hr oral tablet, extended release: 1 tab(s) orally once a day (30 Mar 2022 15:58)  Vitamin B-12 1000 mcg oral tablet: 1 tab(s) orally once a day (30 Mar 2022 15:58)      MEDICATIONS  (STANDING):  amLODIPine   Tablet 10 milliGRAM(s) Oral daily  atorvastatin 10 milliGRAM(s) Oral at bedtime  budesonide 160 MICROgram(s)/formoterol 4.5 MICROgram(s) Inhaler 2 Puff(s) Inhalation two times a day  carvedilol 6.25 milliGRAM(s) Oral every 12 hours  cloNIDine 0.2 milliGRAM(s) Oral three times a day  cyanocobalamin 1000 MICROGram(s) Oral daily  dextrose 5%. 1000 milliLiter(s) (50 mL/Hr) IV Continuous <Continuous>  dextrose 5%. 1000 milliLiter(s) (100 mL/Hr) IV Continuous <Continuous>  dextrose 50% Injectable 25 Gram(s) IV Push once  dextrose 50% Injectable 12.5 Gram(s) IV Push once  dextrose 50% Injectable 25 Gram(s) IV Push once  doxazosin 4 milliGRAM(s) Oral <User Schedule>  folic acid 1 milliGRAM(s) Oral daily  furosemide    Tablet 60 milliGRAM(s) Oral two times a day  glucagon  Injectable 1 milliGRAM(s) IntraMuscular once  hydrALAZINE 100 milliGRAM(s) Oral three times a day  insulin lispro (ADMELOG) corrective regimen sliding scale   SubCutaneous three times a day before meals  insulin lispro (ADMELOG) corrective regimen sliding scale   SubCutaneous at bedtime  isosorbide   mononitrate ER Tablet (IMDUR) 90 milliGRAM(s) Oral daily  lamoTRIgine 100 milliGRAM(s) Oral daily  oxybutynin XL 15 milliGRAM(s) Oral daily  pantoprazole  Injectable 40 milliGRAM(s) IV Push daily  polyethylene glycol 3350 17 Gram(s) Oral daily  senna 2 Tablet(s) Oral at bedtime  venlafaxine XR. 150 milliGRAM(s) Oral daily    MEDICATIONS  (PRN):  acetaminophen     Tablet .. 650 milliGRAM(s) Oral every 6 hours PRN Temp greater or equal to 38C (100.4F), Mild Pain (1 - 3)  aluminum hydroxide/magnesium hydroxide/simethicone Suspension 30 milliLiter(s) Oral every 4 hours PRN Dyspepsia  dextrose Oral Gel 15 Gram(s) Oral once PRN Blood Glucose LESS THAN 70 milliGRAM(s)/deciliter  melatonin 3 milliGRAM(s) Oral at bedtime PRN Insomnia  ondansetron Injectable 4 milliGRAM(s) IV Push every 8 hours PRN Nausea and/or Vomiting      No Known Allergies      Social History:  Lives in [facility]   ADL independent   Ambulates w/ a walker   Former smoker, quit 45 years ago, ~8 pack years   Alcohol denies   Drugs denies   COVID Moderna x2 (30 Mar 2022 15:40)      REVIEW OF SYSTEMS: i am ok  CONSTITUTIONAL: No fever, No chills, [ x ] fatigue, No myalgia, No Body ache, No Weakness  EYES: No eye pain,  No visual disturbances, No discharge, No Redness  ENMT: No ear pain, No nose bleed, No vertigo; No sinus pain, No throat pain, No Congestion  NECK: No pain, No stiffness  RESPIRATORY: No cough, No wheezing, No hemoptysis, No shortness of breath  CARDIOVASCULAR: No chest pain, No palpitations  GASTROINTESTINAL: No abdominal pain, No epigastric pain. No nausea, No vomiting, No diarrhea, No constipation; [ x ] BM - mild hematochezia this am.   GENITOURINARY: No dysuria, No frequency, No urgency, No hematuria, No incontinence  NEUROLOGICAL: No headaches, [ x ] dizziness, No numbness, No tingling, No tremors, No weakness  EXTREMITIES: No Swelling, No Pain, No Edema  SKIN: [x  ] No itching, burning, rashes, or lesions   MUSCULOSKELETAL: No joint pain, No joint swelling; No muscle pain, No back pain, No extremity pain  PSYCHIATRIC: No depression, No anxiety, No mood swings, No difficulty sleeping at night  PAIN SCALE: [ x ] None  [  ] Other-  ROS Unable to obtain due to: [  ] Dementia  [  ] Lethargy  [  ] Sedated  [  ] Non verbal  REST OF REVIEW OF SYSTEMS: [ x ] Normal      Vital Signs Last 24 Hrs  T(C): 36.5 (2022 04:52), Max: 36.8 (31 Mar 2022 20:30)  T(F): 97.7 (2022 04:52), Max: 98.3 (31 Mar 2022 20:30)  HR: 63 (2022 04:52) (63 - 70)  BP: 158/73 (2022 04:52) (111/58 - 158/73)  BP(mean): --  RR: 18 (2022 04:52) (17 - 18)  SpO2: 94% (2022 04:52) (93% - 95%)    CAPILLARY BLOOD GLUCOSE      POCT Blood Glucose.: 131 mg/dL (2022 07:41)  POCT Blood Glucose.: 136 mg/dL (2022 05:55)  POCT Blood Glucose.: 142 mg/dL (31 Mar 2022 21:49)  POCT Blood Glucose.: 149 mg/dL (31 Mar 2022 16:57)  POCT Blood Glucose.: 154 mg/dL (31 Mar 2022 11:32)      I&O's Summary    31 Mar 2022 07:01  -  2022 07:00  --------------------------------------------------------  IN: 240 mL / OUT: 300 mL / NET: -60 mL      PHYSICAL EXAM:  GENERAL:  [ x ] NAD, [ x ] Well appearing, [  ] Agitated, [  ] Drowsy, [  ] Lethargy, [  ] Confused   HEAD:  [ x ] Normal, [  ] Other  EYES:  [ x ] EOMI, [ x ] PERRLA, [x  ] Conjunctiva and sclera clear normal, [ x ] Other - Right conjunctiva with lesion in LLQ, [ x ] Pallor, [  ] Discharge  ENMT:  [ x ] Normal, [ x ] Moist mucous membranes, [ x ] Good dentition, [x  ] No thrush  NECK:  [ x ] Supple, [ x ] No JVD, [ x ] Normal thyroid, [  ] Lymphadenopathy, [  ] Other  CHEST/LUNG:  [ x ] Clear to auscultation bilaterally, [ x ] Breath Sounds equal B/L, [  ] Poor effort, [ x ] No rales, [ x ] No rhonchi, [ x ] No wheezing  HEART:  [ x ] Regular rate and rhythm, [  ] Tachycardia, [  ] Bradycardia, [  ] Irregular, [ x ] 2/6 systolic murmur, No rubs, No gallops, [  ] PPM in place (Mfr:  )  ABDOMEN:  [ x ] Soft, [ x ] Nontender, [ x ] Nondistended, [ x ] No mass, [ x ] Bowel sounds present, [ x ] Obese  NERVOUS SYSTEM:  [ x ] Alert & Oriented x3, [ x ] Nonfocal, [  ] Confusion, [  ] Encephalopathic, [  ] Sedated, [  ] Unable to assess, [  ] Dementia, [  ] Other-  EXTREMITIES:  [ x ] 2+ Peripheral Pulses, No clubbing, No cyanosis,  [  ] Edema B/L lower EXT, [  ] PVD stasis skin changes B/L lower EXT, [  ] Wound  LYMPH:  No lymphadenopathy noted  SKIN:  [ x ] No rashes or lesions, [  ] Pressure ulcers, [  ] Ecchymosis, [  ] Skin tears, [  ] Other    DIET: Diet, NPO after Midnight:      NPO Start Date: 31-Mar-2022,   NPO Start Time: 23:59  Except Medications (22 @ 16:19)  Diet, Consistent Carbohydrate Clear Liquid (22 @ 16:58)      LABS:                        7.6    4.62  )-----------( 161      ( 2022 08:02 )             22.3       Ca    8.9        31 Mar 2022 08:09      PT/INR - ( 31 Mar 2022 16:46 )   PT: 17.7 sec;   INR: 1.51 ratio         PTT - ( 30 Mar 2022 10:49 )  PTT:41.9 sec  Urinalysis Basic - ( 31 Mar 2022 05:27 )    Color: Pale Yellow / Appearance: Clear / S.015 / pH: x  Gluc: x / Ketone: Negative  / Bili: Negative / Urobili: Negative   Blood: x / Protein: 30 mg/dL / Nitrite: Negative   Leuk Esterase: Trace / RBC: 0-2 /HPF / WBC 3-5   Sq Epi: x / Non Sq Epi: Occasional / Bacteria: x        Anemia Panel:  Iron Total, Serum: 33 ug/dL (22 @ 11:49)  Iron - Total Binding Capacity.: 230 ug/dL (22 @ 11:49)  Ferritin, Serum: 40 ng/mL (22 @ 11:49)  Folate, Serum: >20.0 ng/mL (22 @ 11:49)  Vitamin B12, Serum: 1442 pg/mL (22 @ 11:49)      Thyroid Panel:  Thyroid Stimulating Hormone, Serum: 1.19 uIU/mL (22 @ 08:09)      RADIOLOGY & ADDITIONAL TESTS: none      HEALTH ISSUES - PROBLEM Dx:  GIB (gastrointestinal bleeding)    Atrial fibrillation    CAD (coronary artery disease)    Chronic CHF    Anxiety and depression    Stage 3 chronic kidney disease    Type 2 diabetes mellitus    HTN (hypertension)    HLD (hyperlipidemia)    Abnormal finding on lung imaging    DVT prophylaxis    Anemia          Consultant(s) Notes Reviewed:  [ x ] YES     Care Discussed with [ x ] Consultants, [ x ] Patient, [ x ] Family, [  ] HCP, [  ] , [  ] Social Service, [ x ] RN, [  ] Physical Therapy, [  ] Palliative Care Team  DVT PPX: [  ] Lovenox, [  ] SC Heparin, [  ] Coumadin, [  ] Xarelto, [  ] Eliquis, [  ] Pradaxa, [  ] IV Heparin drip, [ x ] SCD, [  ] Ambulation, [  ] Contraindicated 2/2 GI Bleed, [ x ] Contraindicated 2/2  Bleed, [  ] Contraindicated 2/2 Brain Bleed  Advanced Directive: [ x ] None, [  ] DNR/DNI

## 2022-04-01 NOTE — PROGRESS NOTE ADULT - SUBJECTIVE AND OBJECTIVE BOX
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

## 2022-04-02 LAB
ANION GAP SERPL CALC-SCNC: 10 MMOL/L — SIGNIFICANT CHANGE UP (ref 5–17)
BUN SERPL-MCNC: 26 MG/DL — HIGH (ref 7–23)
CALCIUM SERPL-MCNC: 8.9 MG/DL — SIGNIFICANT CHANGE UP (ref 8.5–10.1)
CHLORIDE SERPL-SCNC: 111 MMOL/L — HIGH (ref 96–108)
CO2 SERPL-SCNC: 23 MMOL/L — SIGNIFICANT CHANGE UP (ref 22–31)
CREAT SERPL-MCNC: 1.9 MG/DL — HIGH (ref 0.5–1.3)
EGFR: 36 ML/MIN/1.73M2 — LOW
GLUCOSE SERPL-MCNC: 174 MG/DL — HIGH (ref 70–99)
HCT VFR BLD CALC: 22.5 % — LOW (ref 39–50)
HCT VFR BLD CALC: 25.3 % — LOW (ref 39–50)
HGB BLD-MCNC: 7.6 G/DL — LOW (ref 13–17)
HGB BLD-MCNC: 8.6 G/DL — LOW (ref 13–17)
MAGNESIUM SERPL-MCNC: 2 MG/DL — SIGNIFICANT CHANGE UP (ref 1.6–2.6)
MCHC RBC-ENTMCNC: 28.9 PG — SIGNIFICANT CHANGE UP (ref 27–34)
MCHC RBC-ENTMCNC: 33.8 GM/DL — SIGNIFICANT CHANGE UP (ref 32–36)
MCV RBC AUTO: 85.6 FL — SIGNIFICANT CHANGE UP (ref 80–100)
NRBC # BLD: 0 /100 WBCS — SIGNIFICANT CHANGE UP (ref 0–0)
PHOSPHATE SERPL-MCNC: 3.4 MG/DL — SIGNIFICANT CHANGE UP (ref 2.5–4.5)
PLATELET # BLD AUTO: 157 K/UL — SIGNIFICANT CHANGE UP (ref 150–400)
POTASSIUM SERPL-MCNC: 3.2 MMOL/L — LOW (ref 3.5–5.3)
POTASSIUM SERPL-SCNC: 3.2 MMOL/L — LOW (ref 3.5–5.3)
RBC # BLD: 2.63 M/UL — LOW (ref 4.2–5.8)
RBC # FLD: 16.7 % — HIGH (ref 10.3–14.5)
SODIUM SERPL-SCNC: 144 MMOL/L — SIGNIFICANT CHANGE UP (ref 135–145)
WBC # BLD: 5.29 K/UL — SIGNIFICANT CHANGE UP (ref 3.8–10.5)
WBC # FLD AUTO: 5.29 K/UL — SIGNIFICANT CHANGE UP (ref 3.8–10.5)

## 2022-04-02 PROCEDURE — 99233 SBSQ HOSP IP/OBS HIGH 50: CPT

## 2022-04-02 RX ORDER — POTASSIUM CHLORIDE 20 MEQ
40 PACKET (EA) ORAL EVERY 4 HOURS
Refills: 0 | Status: COMPLETED | OUTPATIENT
Start: 2022-04-02 | End: 2022-04-02

## 2022-04-02 RX ORDER — IRON SUCROSE 20 MG/ML
100 INJECTION, SOLUTION INTRAVENOUS EVERY 24 HOURS
Refills: 0 | Status: COMPLETED | OUTPATIENT
Start: 2022-04-02 | End: 2022-04-04

## 2022-04-02 RX ADMIN — PANTOPRAZOLE SODIUM 40 MILLIGRAM(S): 20 TABLET, DELAYED RELEASE ORAL at 11:58

## 2022-04-02 RX ADMIN — SENNA PLUS 2 TABLET(S): 8.6 TABLET ORAL at 23:37

## 2022-04-02 RX ADMIN — Medication 40 MILLIEQUIVALENT(S): at 17:50

## 2022-04-02 RX ADMIN — PREGABALIN 1000 MICROGRAM(S): 225 CAPSULE ORAL at 11:57

## 2022-04-02 RX ADMIN — Medication 40 MILLIEQUIVALENT(S): at 11:57

## 2022-04-02 RX ADMIN — Medication 4 MILLIGRAM(S): at 23:37

## 2022-04-02 RX ADMIN — Medication 150 MILLIGRAM(S): at 11:58

## 2022-04-02 RX ADMIN — IRON SUCROSE 100 MILLIGRAM(S): 20 INJECTION, SOLUTION INTRAVENOUS at 17:36

## 2022-04-02 RX ADMIN — LAMOTRIGINE 100 MILLIGRAM(S): 25 TABLET, ORALLY DISINTEGRATING ORAL at 11:58

## 2022-04-02 RX ADMIN — ISOSORBIDE MONONITRATE 90 MILLIGRAM(S): 60 TABLET, EXTENDED RELEASE ORAL at 11:58

## 2022-04-02 RX ADMIN — Medication 1 MILLIGRAM(S): at 11:58

## 2022-04-02 RX ADMIN — Medication 60 MILLIGRAM(S): at 17:36

## 2022-04-02 RX ADMIN — CARVEDILOL PHOSPHATE 6.25 MILLIGRAM(S): 80 CAPSULE, EXTENDED RELEASE ORAL at 17:36

## 2022-04-02 RX ADMIN — Medication 100 MILLIGRAM(S): at 06:13

## 2022-04-02 RX ADMIN — ATORVASTATIN CALCIUM 10 MILLIGRAM(S): 80 TABLET, FILM COATED ORAL at 23:37

## 2022-04-02 RX ADMIN — Medication 4 MILLIGRAM(S): at 08:30

## 2022-04-02 RX ADMIN — Medication 0.2 MILLIGRAM(S): at 23:37

## 2022-04-02 RX ADMIN — AMLODIPINE BESYLATE 10 MILLIGRAM(S): 2.5 TABLET ORAL at 06:13

## 2022-04-02 RX ADMIN — Medication 60 MILLIGRAM(S): at 06:14

## 2022-04-02 RX ADMIN — Medication 0.2 MILLIGRAM(S): at 06:13

## 2022-04-02 RX ADMIN — Medication 15 MILLIGRAM(S): at 11:57

## 2022-04-02 RX ADMIN — Medication 100 MILLIGRAM(S): at 23:40

## 2022-04-02 NOTE — PROGRESS NOTE ADULT - SUBJECTIVE AND OBJECTIVE BOX
INTERVAL HPI/OVERNIGHT EVENTS:  No new overnight event.  No N/V/D.     MEDICATIONS  (STANDING):  amLODIPine   Tablet 10 milliGRAM(s) Oral daily  atorvastatin 10 milliGRAM(s) Oral at bedtime  budesonide 160 MICROgram(s)/formoterol 4.5 MICROgram(s) Inhaler 2 Puff(s) Inhalation two times a day  carvedilol 6.25 milliGRAM(s) Oral every 12 hours  cloNIDine 0.2 milliGRAM(s) Oral three times a day  cyanocobalamin 1000 MICROGram(s) Oral daily  dextrose 5%. 1000 milliLiter(s) (50 mL/Hr) IV Continuous <Continuous>  dextrose 5%. 1000 milliLiter(s) (100 mL/Hr) IV Continuous <Continuous>  dextrose 50% Injectable 25 Gram(s) IV Push once  dextrose 50% Injectable 12.5 Gram(s) IV Push once  dextrose 50% Injectable 25 Gram(s) IV Push once  doxazosin 4 milliGRAM(s) Oral <User Schedule>  folic acid 1 milliGRAM(s) Oral daily  furosemide    Tablet 60 milliGRAM(s) Oral two times a day  glucagon  Injectable 1 milliGRAM(s) IntraMuscular once  hydrALAZINE 100 milliGRAM(s) Oral three times a day  insulin lispro (ADMELOG) corrective regimen sliding scale   SubCutaneous three times a day before meals  insulin lispro (ADMELOG) corrective regimen sliding scale   SubCutaneous at bedtime  isosorbide   mononitrate ER Tablet (IMDUR) 90 milliGRAM(s) Oral daily  lamoTRIgine 100 milliGRAM(s) Oral daily  oxybutynin XL 15 milliGRAM(s) Oral daily  pantoprazole  Injectable 40 milliGRAM(s) IV Push daily  polyethylene glycol 3350 17 Gram(s) Oral daily  potassium chloride    Tablet ER 40 milliEquivalent(s) Oral every 4 hours  senna 2 Tablet(s) Oral at bedtime  venlafaxine XR. 150 milliGRAM(s) Oral daily    MEDICATIONS  (PRN):  acetaminophen     Tablet .. 650 milliGRAM(s) Oral every 6 hours PRN Temp greater or equal to 38C (100.4F), Mild Pain (1 - 3)  aluminum hydroxide/magnesium hydroxide/simethicone Suspension 30 milliLiter(s) Oral every 4 hours PRN Dyspepsia  dextrose Oral Gel 15 Gram(s) Oral once PRN Blood Glucose LESS THAN 70 milliGRAM(s)/deciliter  melatonin 3 milliGRAM(s) Oral at bedtime PRN Insomnia  ondansetron Injectable 4 milliGRAM(s) IV Push every 8 hours PRN Nausea and/or Vomiting      Allergies    No Known Allergies    Intolerances        Review of Systems:    General:  No wt loss, fevers, chills, night sweats,fatigue,   Eyes:  Good vision, no reported pain  ENT:  No sore throat, pain, runny nose, dysphagia  CV:  No pain, palpitatioins, hypo/hypertension  Resp:  No dyspnea, cough, tachypnea, wheezing  GI:  No pain, No nausea, No vomiting, No diarrhea, No constipatiion, No weight loss, No fever, No pruritis, No rectal bleeding, No tarry stools, No dysphagia,  :  No pain, bleeding, incontinence, nocturia  Muscle:  No pain, weakness  Neuro:  No weakness, tingling, memory problems  Psych:  No fatigue, insomnia, mood problems, depression  Endocrine:  No polyuria, polydypsia, cold/heat intolerance  Heme:  No petechiae, ecchymosis, easy bruisability  Skin:  No rash, tattoos, scars, edema      Vital Signs Last 24 Hrs  T(C): 36.7 (02 Apr 2022 06:11), Max: 36.7 (01 Apr 2022 11:59)  T(F): 98.1 (02 Apr 2022 06:11), Max: 98.1 (01 Apr 2022 13:29)  HR: 63 (02 Apr 2022 08:27) (56 - 64)  BP: 131/72 (02 Apr 2022 08:27) (123/75 - 143/80)  BP(mean): --  RR: 18 (02 Apr 2022 06:11) (16 - 18)  SpO2: 95% (02 Apr 2022 06:11) (94% - 97%)    PHYSICAL EXAM:    Constitutional: NAD, well-developed  HEENT: EOMI, throat clear  Neck: No LAD, supple  Respiratory: CTA and P  Cardiovascular: S1 and S2, RRR, no M  Gastrointestinal: BS+, soft, NT/ND, neg HSM,  Extremities: No peripheral edema, neg clubing, cyanosis  Vascular: 2+ peripheral pulses  Neurological: A/O x 3, no focal deficits  Psychiatric: Normal mood, normal affect  Skin: No rashes      LABS:                        7.6    5.29  )-----------( 157      ( 02 Apr 2022 08:58 )             22.5     04-02    144  |  111<H>  |  26<H>  ----------------------------<  174<H>  3.2<L>   |  23  |  1.90<H>    Ca    8.9      02 Apr 2022 08:58  Phos  3.4     04-02  Mg     2.0     04-02    TPro  5.2<L>  /  Alb  2.9<L>  /  TBili  0.3  /  DBili  x   /  AST  11<L>  /  ALT  13  /  AlkPhos  49  04-01    PT/INR - ( 31 Mar 2022 16:46 )   PT: 17.7 sec;   INR: 1.51 ratio               RADIOLOGY & ADDITIONAL TESTS:

## 2022-04-02 NOTE — PROGRESS NOTE ADULT - PROBLEM SELECTOR PLAN 2
Likely Ac Blood loss anemia in the setting of GIB- AC on chronic anemia - additional 1 u pRBC today  - H/H 9.2 on admission, baseline hemoglobin ~10   - Hb 7.6 4/1; s/p 2 unit of prbc 03/31; s/p 1 unit pRBC 4/1; Hb 7/6 today --> ordering 1 u prbc, will f/u post-transfusion H/H  - Transfuse for hemoglobin <8 considering CAD history and current GIB  - maintain active type and screen  - if pt has further episodes of rectal bleeding/becomes hemodynamically unstable, will order NM GI bleed localization study and possibly consult surgery and/or IR for possible intervention  - B12, folate wnl; iron panel w low serum iron   - folate, vitamin B12, TSH wnl   - F/u AM CBC  - Heme-onc consult Jennifer: replete fe stores with IV fe

## 2022-04-02 NOTE — PROGRESS NOTE ADULT - SUBJECTIVE AND OBJECTIVE BOX
Patient is a 77y old  Male who presents with a chief complaint of Rectal bleed (01 Apr 2022 14:04)    HPI:  76 yo M PMH of A fib (on Eliquis) , MGUS s/p PRBC transfusion 10/21, anxiety/depression, CAD s/p stents (not on plavix), BPH, CHF, Diverticulosis, HLD, HTN, thyroid nodules, CKD stage 3, DM2 presents to the ED from [facility] after having up to 3 bowel movements with dark/black stool. Patient would have another episode of dark stool in the ED. Patient denied consuming any beets or magnesium/calcium supplements recently. Patient states that onset of stool was very sudden. Patient recently admitted to [facility] in January for CHF exacerbation, improved w/ diuresis. Patient was seen and examined at bedside, denied any headache, cp/pressure, palpitations, myalgias, fevers/chills, SOB, hematuria, dysuria. Patient endorses lightheadedness/dizziness, 4/10 non-radiating sharp periumbilical pain, and chronic knee pain.     In ED:   Vitals: 97.5F, HR 69, 133/81, RR 18, 96% on RA   Labs significant for: Occult blood +, GFR 29, INR 2.01, Creatinine 2.3 (baseline 2.2)  Imaging: CT chest: Few 2mm nodules, coronary artery calcification  CT abd/pelvis: diverticulosis w/o diverticulitis   CT head: No acute intracranial hemorrhage or acute territorial infarct   EKG: Sinus rhythm w/ 1st degree heart block @ 60bpm, incomplete RBBB unchanged from prior ekg   Recieved: Pantoprazole infusion   (30 Mar 2022 15:40)    INTERVAL HPI:  3/31/2022: Patient had two more bowel movements with dark stool from yesterday. VSS. Patient seen and examined at bedside, denies any headache, cp/pressure/palpitations, abd pain, dysuria/hematuria, SOB, nausea/vomiting, fevers/chills. Patient continues to endorse lightheadedness/dizziness, and fatigue. AM hgb showed that patients hgb had dropped to 7.1 from 9.2, patient to receive 2 units of PRBC.  04/01/2022: Pt episode of mild hematochezia this am. States he still experiences lightheadedness with dizziness. Denies fever, chills, chest pain, abd pain, sob, palpitations, n/v/d/c. Pt w Hb 7.6 this am. received a unit of prbc. NPO for colonoscopy. Colonoscopy done 4/1 that revealed old blood but no active bleeding. Hb did not have adequate response to prbc.  4/2/22: Patient seen and examined at bedside. No overnight events occurred. Pt says he wants regular food. He reports a mild headache but denies chest pain, SOB, abd pain, nausea, vomiting, fever, chills. Tolerating PO. Ambulated to bathroom. Pt DENIES further episodes of rectal bleeding - confirmed with RN, no reported bloody BMs. Last BM yesterday. Denies dysuria.    Home Medications:  Aristada 1064 mg/3.9 mL intramuscular suspension, extended release: 1 dose(s) intramuscular every 8 weeks (30 Mar 2022 15:58)  carvedilol 6.25 mg oral tablet: 1 tab(s) orally 2 times a day (30 Mar 2022 15:58)  cloNIDine 0.2 mg oral tablet: 1 tab(s) orally 3 times a day (30 Mar 2022 15:58)  diclofenac 1% topical gel: Apply topically to affected area 3 times a day (30 Mar 2022 15:58)  doxazosin 4 mg oral tablet: 1 tab(s) orally 2 times a day (30 Mar 2022 15:58)  furosemide 20 mg oral tablet: 3 tab(s) orally 2 times a day (30 Mar 2022 15:58)  lactobacillus acidophilus oral capsule: 1 cap(s) orally 2 times a day (30 Mar 2022 15:58)  lamoTRIgine 100 mg oral tablet: 1 tab(s) orally once a day (30 Mar 2022 15:58)  oxybutynin 15 mg/24 hr oral tablet, extended release: 1 tab(s) orally once a day (30 Mar 2022 15:58)  Vitamin B-12 1000 mcg oral tablet: 1 tab(s) orally once a day (30 Mar 2022 15:58)      MEDICATIONS  (STANDING):  amLODIPine   Tablet 10 milliGRAM(s) Oral daily  atorvastatin 10 milliGRAM(s) Oral at bedtime  budesonide 160 MICROgram(s)/formoterol 4.5 MICROgram(s) Inhaler 2 Puff(s) Inhalation two times a day  carvedilol 6.25 milliGRAM(s) Oral every 12 hours  cloNIDine 0.2 milliGRAM(s) Oral three times a day  cyanocobalamin 1000 MICROGram(s) Oral daily  dextrose 5%. 1000 milliLiter(s) (50 mL/Hr) IV Continuous <Continuous>  dextrose 5%. 1000 milliLiter(s) (100 mL/Hr) IV Continuous <Continuous>  dextrose 50% Injectable 25 Gram(s) IV Push once  dextrose 50% Injectable 12.5 Gram(s) IV Push once  dextrose 50% Injectable 25 Gram(s) IV Push once  doxazosin 4 milliGRAM(s) Oral <User Schedule>  folic acid 1 milliGRAM(s) Oral daily  furosemide    Tablet 60 milliGRAM(s) Oral two times a day  glucagon  Injectable 1 milliGRAM(s) IntraMuscular once  hydrALAZINE 100 milliGRAM(s) Oral three times a day  insulin lispro (ADMELOG) corrective regimen sliding scale   SubCutaneous three times a day before meals  insulin lispro (ADMELOG) corrective regimen sliding scale   SubCutaneous at bedtime  isosorbide   mononitrate ER Tablet (IMDUR) 90 milliGRAM(s) Oral daily  lamoTRIgine 100 milliGRAM(s) Oral daily  oxybutynin XL 15 milliGRAM(s) Oral daily  pantoprazole  Injectable 40 milliGRAM(s) IV Push daily  polyethylene glycol 3350 17 Gram(s) Oral daily  potassium chloride    Tablet ER 40 milliEquivalent(s) Oral every 4 hours  senna 2 Tablet(s) Oral at bedtime  venlafaxine XR. 150 milliGRAM(s) Oral daily    MEDICATIONS  (PRN):  acetaminophen     Tablet .. 650 milliGRAM(s) Oral every 6 hours PRN Temp greater or equal to 38C (100.4F), Mild Pain (1 - 3)  aluminum hydroxide/magnesium hydroxide/simethicone Suspension 30 milliLiter(s) Oral every 4 hours PRN Dyspepsia  dextrose Oral Gel 15 Gram(s) Oral once PRN Blood Glucose LESS THAN 70 milliGRAM(s)/deciliter  melatonin 3 milliGRAM(s) Oral at bedtime PRN Insomnia  ondansetron Injectable 4 milliGRAM(s) IV Push every 8 hours PRN Nausea and/or Vomiting      Allergies    No Known Allergies    Intolerances        Social History:  Lives in [place]   ADL independent   Ambulates w/ a walker   Former smoker, quit 45 years ago, ~8 pack years   Alcohol denies   Drugs denies   COVID Moderna x2 (30 Mar 2022 15:40)      REVIEW OF SYSTEMS:  CONSTITUTIONAL: No fever, No chills, No fatigue, No myalgia, No Body ache, No Weakness +headache  EYES: No eye pain,  No visual disturbances, No discharge, NO Redness  ENMT:  No ear pain, No nose bleed, No vertigo; No sinus pain, NO throat pain, No Congestion  NECK: No pain, No stiffness  RESPIRATORY: No cough, NO wheezing, No  hemoptysis, NO  shortness of breath  CARDIOVASCULAR: No chest pain, palpitations  GASTROINTESTINAL: No abdominal pain, NO epigastric pain. No nausea, No vomiting; No diarrhea, No constipation. [ x ] BM  GENITOURINARY: No dysuria, No frequency, No urgency, No hematuria, NO incontinence  NEUROLOGICAL: No headaches, No dizziness, No numbness, No tingling, No tremors, No weakness  EXT: No Swelling, No Pain, No Edema  SKIN:  [ x ] No itching, burning, rashes, or lesions   MUSCULOSKELETAL: No joint pain ,No Jt swelling; No muscle pain, No back pain, No extremity pain  PSYCHIATRIC: No depression,  No anxiety,  No mood swings ,No difficulty sleeping at night  PAIN SCALE: [ x ] None  [  ] Other-  ROS Unable to obtain due to - [  ] Dementia  [  ] Lethargy [  ] Drowsy [  ] Sedated [  ] non verbal  REST OF REVIEW Of SYSTEM - [ x ] Normal     Vital Signs Last 24 Hrs  T(C): 36.7 (02 Apr 2022 06:11), Max: 36.7 (01 Apr 2022 11:59)  T(F): 98.1 (02 Apr 2022 06:11), Max: 98.1 (01 Apr 2022 13:29)  HR: 63 (02 Apr 2022 08:27) (56 - 64)  BP: 131/72 (02 Apr 2022 08:27) (123/75 - 164/81)  BP(mean): --  RR: 18 (02 Apr 2022 06:11) (16 - 18)  SpO2: 95% (02 Apr 2022 06:11) (94% - 97%)  Finger Stick          PHYSICAL EXAM:  GENERAL:  [ x ] NAD , [ x ] well appearing, [  ] Agitated, [  ] Drowsy,  [  ] Lethargy, [  ] confused   HEAD:  [ x ] Normal, [  ] Other  EYES:  [ x ] EOMI, [ x ] PERRLA, [ x ] conjunctiva and sclera clear normal, [  ] Other,  [  ] Pallor,[  ] Discharge  ENMT:  [ x ] Normal, [ x ] Moist mucous membranes, [  ] Good dentition, [  ] No Thrush  NECK:  [ x ] Supple, [ x ] No JVD, [  ] Normal thyroid, [  ] Lymphadenopathy [  ] Other  CHEST/LUNG:  [ x ] Clear to auscultation bilaterally, [ x ] Breath Sounds equal B/L, [  ] poor effort  [ x ] No rales, [ x ] No rhonchi  [ x ]  No wheezing,   HEART:  [ x ] Regular rate and rhythm, [  ] tachycardia, [  ] Bradycardia,  [  ] irregular  [ x ] systolic murmur, No rubs, No gallops, [  ] PPM in place (Mfr:  )  ABDOMEN:  [ x ] Soft, [ x ] Nontender, [ x ] Nondistended, [  ] No mass, [  ] Bowel sounds present, [ x ] obese  NERVOUS SYSTEM:  [ x ] Alert & Oriented X3, [ x ] Nonfocal  [  ] Confusion  [  ] Encephalopathic [  ] Sedated [  ] Unable to assess, [  ] Dementia [  ] Other-  EXTREMITIES: [  ] 2+ Peripheral Pulses, No clubbing, No cyanosis,  [ x ] No edema B/L lower EXT. [  ] PVD stasis skin changes B/L Lower EXT, [  ] wound  LYMPH: No lymphadenopathy noted  SKIN:  [ x ] No rashes or lesions, [  ] Pressure Ulcers, [  ] ecchymosis, [  ] Skin Tears, [  ] Other    DIET: Diet, Consistent Carbohydrate Full Liquid (04-02-22 @ 08:34)      LABS:                        7.6    5.29  )-----------( 157      ( 02 Apr 2022 08:58 )             22.5     02 Apr 2022 08:58    144    |  111    |  26     ----------------------------<  174    3.2     |  23     |  1.90     Ca    8.9        02 Apr 2022 08:58  Phos  3.4       02 Apr 2022 08:58  Mg     2.0       02 Apr 2022 08:58      PT/INR - ( 31 Mar 2022 16:46 )   PT: 17.7 sec;   INR: 1.51 ratio                                  Anemia Panel:  Iron Total, Serum: 33 ug/dL (03-31-22 @ 11:49)  Iron - Total Binding Capacity.: 230 ug/dL (03-31-22 @ 11:49)  Ferritin, Serum: 40 ng/mL (03-31-22 @ 11:49)  Folate, Serum: >20.0 ng/mL (03-31-22 @ 11:49)  Vitamin B12, Serum: 1442 pg/mL (03-31-22 @ 11:49)      Thyroid Panel:  Thyroid Stimulating Hormone, Serum: 1.19 uIU/mL (03-31-22 @ 08:09)                RADIOLOGY & ADDITIONAL TESTS:    No new imaging studies.    HEALTH ISSUES - PROBLEM Dx:  GIB (gastrointestinal bleeding)    Anxiety and depression    Type 2 diabetes mellitus    HTN (hypertension)    HLD (hyperlipidemia)    DVT prophylaxis    Anemia    CAD (coronary artery disease)    Atrial fibrillation    Chronic CHF    Stage 3 chronic kidney disease    Abnormal finding on lung imaging            Consultant(s) Notes Reviewed:  [ x ] YES     Care Discussed with [X] Consultants  [ x ] Patient  [  ] Family [  ] HCP [  ]   [  ] Social Service  [  ] RN, [  ] Physical Therapy,[  ] Palliative care team  DVT PPX: [  ] Lovenox, [  ] S C Heparin, [  ] Coumadin, [  ] Xarelto, [  ] Eliquis, [  ] Pradaxa, [  ] IV Heparin drip, [ x ] SCD [  ] Contraindication 2 to GI Bleed,[  ] Ambulation [  ] Contraindicated 2 to  bleed [  ] Contraindicated 2 to Brain Bleed  Advanced directive: [ x ] None, [  ] DNR/DNI Patient is a 77y old  Male who presents with a chief complaint of Rectal bleed (01 Apr 2022 14:04)    HPI:  76 yo M PMH of A fib (on Eliquis) , 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 Patient would have another episode of dark stool in the ED. Patient denied consuming any beets or magnesium/calcium supplements recently. Patient states that onset of stool was very sudden. Patient recently admitted to [facility] in January for CHF exacerbation, improved w/ diuresis. Patient was seen and examined at bedside, denied any headache, cp/pressure, palpitations, myalgias, fevers/chills, SOB, hematuria, dysuria. Patient endorses lightheadedness/dizziness, 4/10 non-radiating sharp periumbilical pain, and chronic knee pain.     In ED:   Vitals: 97.5F, HR 69, 133/81, RR 18, 96% on RA   Labs significant for: Occult blood +, GFR 29, INR 2.01, Creatinine 2.3 (baseline 2.2)  Imaging: CT chest: Few 2mm nodules, coronary artery calcification  CT abd/pelvis: diverticulosis w/o diverticulitis   CT head: No acute intracranial hemorrhage or acute territorial infarct   EKG: Sinus rhythm w/ 1st degree heart block @ 60bpm, incomplete RBBB unchanged from prior ekg   Recieved: Pantoprazole infusion   (30 Mar 2022 15:40)    INTERVAL HPI:  3/31/2022: Patient had two more bowel movements with dark stool from yesterday. VSS. Patient seen and examined at bedside, denies any headache, cp/pressure/palpitations, abd pain, dysuria/hematuria, SOB, nausea/vomiting, fevers/chills. Patient continues to endorse lightheadedness/dizziness, and fatigue. AM hgb showed that patients hgb had dropped to 7.1 from 9.2, patient to receive 2 units of PRBC.  04/01/2022: Pt episode of mild hematochezia this am. States he still experiences lightheadedness with dizziness. Denies fever, chills, chest pain, abd pain, sob, palpitations, n/v/d/c. Pt w Hb 7.6 this am. received a unit of prbc. NPO for colonoscopy. Colonoscopy done 4/1 that revealed old blood but no active bleeding. Hb did not have adequate response to prbc.  4/2/22: Patient seen and examined at bedside. No overnight events occurred. Pt says he wants regular food. He reports a mild headache but denies chest pain, SOB, abd pain, nausea, vomiting, fever, chills. Tolerating PO. Ambulated to bathroom. Pt DENIES further episodes of rectal bleeding - confirmed with RN, no reported bloody BMs. Last BM yesterday. Low H/H     OVERNIGHT EVENTS: -NONE    Home Medications:  Aristada 1064 mg/3.9 mL intramuscular suspension, extended release: 1 dose(s) intramuscular every 8 weeks (30 Mar 2022 15:58)  carvedilol 6.25 mg oral tablet: 1 tab(s) orally 2 times a day (30 Mar 2022 15:58)  cloNIDine 0.2 mg oral tablet: 1 tab(s) orally 3 times a day (30 Mar 2022 15:58)  diclofenac 1% topical gel: Apply topically to affected area 3 times a day (30 Mar 2022 15:58)  doxazosin 4 mg oral tablet: 1 tab(s) orally 2 times a day (30 Mar 2022 15:58)  furosemide 20 mg oral tablet: 3 tab(s) orally 2 times a day (30 Mar 2022 15:58)  lactobacillus acidophilus oral capsule: 1 cap(s) orally 2 times a day (30 Mar 2022 15:58)  lamoTRIgine 100 mg oral tablet: 1 tab(s) orally once a day (30 Mar 2022 15:58)  oxybutynin 15 mg/24 hr oral tablet, extended release: 1 tab(s) orally once a day (30 Mar 2022 15:58)  Vitamin B-12 1000 mcg oral tablet: 1 tab(s) orally once a day (30 Mar 2022 15:58)      MEDICATIONS  (STANDING):  amLODIPine   Tablet 10 milliGRAM(s) Oral daily  atorvastatin 10 milliGRAM(s) Oral at bedtime  budesonide 160 MICROgram(s)/formoterol 4.5 MICROgram(s) Inhaler 2 Puff(s) Inhalation two times a day  carvedilol 6.25 milliGRAM(s) Oral every 12 hours  cloNIDine 0.2 milliGRAM(s) Oral three times a day  cyanocobalamin 1000 MICROGram(s) Oral daily  dextrose 5%. 1000 milliLiter(s) (50 mL/Hr) IV Continuous <Continuous>  dextrose 5%. 1000 milliLiter(s) (100 mL/Hr) IV Continuous <Continuous>  dextrose 50% Injectable 25 Gram(s) IV Push once  dextrose 50% Injectable 12.5 Gram(s) IV Push once  dextrose 50% Injectable 25 Gram(s) IV Push once  doxazosin 4 milliGRAM(s) Oral <User Schedule>  folic acid 1 milliGRAM(s) Oral daily  furosemide    Tablet 60 milliGRAM(s) Oral two times a day  glucagon  Injectable 1 milliGRAM(s) IntraMuscular once  hydrALAZINE 100 milliGRAM(s) Oral three times a day  insulin lispro (ADMELOG) corrective regimen sliding scale   SubCutaneous three times a day before meals  insulin lispro (ADMELOG) corrective regimen sliding scale   SubCutaneous at bedtime  isosorbide   mononitrate ER Tablet (IMDUR) 90 milliGRAM(s) Oral daily  lamoTRIgine 100 milliGRAM(s) Oral daily  oxybutynin XL 15 milliGRAM(s) Oral daily  pantoprazole  Injectable 40 milliGRAM(s) IV Push daily  polyethylene glycol 3350 17 Gram(s) Oral daily  potassium chloride    Tablet ER 40 milliEquivalent(s) Oral every 4 hours  senna 2 Tablet(s) Oral at bedtime  venlafaxine XR. 150 milliGRAM(s) Oral daily    MEDICATIONS  (PRN):  acetaminophen     Tablet .. 650 milliGRAM(s) Oral every 6 hours PRN Temp greater or equal to 38C (100.4F), Mild Pain (1 - 3)  aluminum hydroxide/magnesium hydroxide/simethicone Suspension 30 milliLiter(s) Oral every 4 hours PRN Dyspepsia  dextrose Oral Gel 15 Gram(s) Oral once PRN Blood Glucose LESS THAN 70 milliGRAM(s)/deciliter  melatonin 3 milliGRAM(s) Oral at bedtime PRN Insomnia  ondansetron Injectable 4 milliGRAM(s) IV Push every 8 hours PRN Nausea and/or Vomiting      Allergies    No Known Allergies    Intolerances        Social History:  Lives in [facility]   ADL independent   Ambulates w/ a walker   Former smoker, quit 45 years ago, ~8 pack years   Alcohol denies   Drugs denies   COVID Moderna x2 (30 Mar 2022 15:40)      REVIEW OF SYSTEMS: i want to eat more  CONSTITUTIONAL: No fever, No chills, No fatigue, No myalgia, No Body ache, No Weakness +headache  EYES: No eye pain,  No visual disturbances, No discharge, NO Redness  ENMT:  No ear pain, No nose bleed, No vertigo; No sinus pain, NO throat pain, No Congestion  NECK: No pain, No stiffness  RESPIRATORY: No cough, NO wheezing, No  hemoptysis, NO  shortness of breath  CARDIOVASCULAR: No chest pain, palpitations  GASTROINTESTINAL: No abdominal pain, NO epigastric pain. No nausea, No vomiting; No diarrhea, No constipation. [ x ] BM  GENITOURINARY: No dysuria, No frequency, No urgency, No hematuria, NO incontinence  NEUROLOGICAL: No headaches, No dizziness, No numbness, No tingling, No tremors, No weakness  EXT: No Swelling, No Pain, No Edema  SKIN:  [ x ] No itching, burning, rashes, or lesions   MUSCULOSKELETAL: No joint pain ,No Jt swelling; No muscle pain, No back pain, No extremity pain  PSYCHIATRIC: No depression,  No anxiety,  No mood swings ,No difficulty sleeping at night  PAIN SCALE: [ x ] None  [  ] Other-  ROS Unable to obtain due to - [  ] Dementia  [  ] Lethargy [  ] Drowsy [  ] Sedated [  ] non verbal  REST OF REVIEW Of SYSTEM - [ x ] Normal     Vital Signs Last 24 Hrs  T(C): 36.7 (02 Apr 2022 06:11), Max: 36.7 (01 Apr 2022 11:59)  T(F): 98.1 (02 Apr 2022 06:11), Max: 98.1 (01 Apr 2022 13:29)  HR: 63 (02 Apr 2022 08:27) (56 - 64)  BP: 131/72 (02 Apr 2022 08:27) (123/75 - 164/81)  BP(mean): --  RR: 18 (02 Apr 2022 06:11) (16 - 18)  SpO2: 95% (02 Apr 2022 06:11) (94% - 97%)  Finger Stick          PHYSICAL EXAM:  GENERAL:  [ x ] NAD , [ x ] well appearing, [  ] Agitated, [  ] Drowsy,  [  ] Lethargy, [  ] confused   HEAD:  [ x ] Normal, [  ] Other  EYES:  [ x ] EOMI, [ x ] PERRLA, [ x ] conjunctiva and sclera clear normal, [  ] Other,  [  ] Pallor,[  ] Discharge  ENMT:  [ x ] Normal, [ x ] Moist mucous membranes, [  ] Good dentition, [  ] No Thrush  NECK:  [ x ] Supple, [ x ] No JVD, [  ] Normal thyroid, [  ] Lymphadenopathy [  ] Other  CHEST/LUNG:  [ x ] Clear to auscultation bilaterally, [ x ] Breath Sounds equal B/L, [  ] poor effort  [ x ] No rales, [ x ] No rhonchi  [ x ]  No wheezing,   HEART:  [ x ] Regular rate and rhythm, [  ] tachycardia, [  ] Bradycardia,  [  ] irregular  [ x ] systolic murmur, No rubs, No gallops, [  ] PPM in place (Mfr:  )  ABDOMEN:  [ x ] Soft, [ x ] Nontender, [ x ] Nondistended, [  ] No mass, [  ] Bowel sounds present, [ x ] obese  NERVOUS SYSTEM:  [ x ] Alert & Oriented X3, [ x ] Nonfocal  [  ] Confusion  [  ] Encephalopathic [  ] Sedated [  ] Unable to assess, [  ] Dementia [  ] Other-  EXTREMITIES: [  ] 2+ Peripheral Pulses, No clubbing, No cyanosis,  [ x ] No edema B/L lower EXT. [  ] PVD stasis skin changes B/L Lower EXT, [  ] wound  LYMPH: No lymphadenopathy noted  SKIN:  [ x ] No rashes or lesions, [  ] Pressure Ulcers, [  ] ecchymosis, [  ] Skin Tears, [  ] Other    DIET: Diet, Consistent Carbohydrate Full Liquid (04-02-22 @ 08:34)      LABS:                        7.6    5.29  )-----------( 157      ( 02 Apr 2022 08:58 )             22.5     02 Apr 2022 08:58    144    |  111    |  26     ----------------------------<  174    3.2     |  23     |  1.90     Ca    8.9        02 Apr 2022 08:58  Phos  3.4       02 Apr 2022 08:58  Mg     2.0       02 Apr 2022 08:58      PT/INR - ( 31 Mar 2022 16:46 )   PT: 17.7 sec;   INR: 1.51 ratio          Anemia Panel:  Iron Total, Serum: 33 ug/dL (03-31-22 @ 11:49)  Iron - Total Binding Capacity.: 230 ug/dL (03-31-22 @ 11:49)  Ferritin, Serum: 40 ng/mL (03-31-22 @ 11:49)  Folate, Serum: >20.0 ng/mL (03-31-22 @ 11:49)  Vitamin B12, Serum: 1442 pg/mL (03-31-22 @ 11:49)      Thyroid Panel:  Thyroid Stimulating Hormone, Serum: 1.19 uIU/mL (03-31-22 @ 08:09)                RADIOLOGY & ADDITIONAL TESTS:    No new imaging studies.    HEALTH ISSUES - PROBLEM Dx:  GIB (gastrointestinal bleeding)    Anxiety and depression    Type 2 diabetes mellitus    HTN (hypertension)    HLD (hyperlipidemia)    DVT prophylaxis    Anemia    CAD (coronary artery disease)    Atrial fibrillation    Chronic CHF    Stage 3 chronic kidney disease    Abnormal finding on lung imaging            Consultant(s) Notes Reviewed:  [ x ] YES     Care Discussed with [X] Consultants  [ x ] Patient  [  ] Family [  ] HCP [  ]   [ x ] Social Service  [x  ] RN, [  ] Physical Therapy,[  ] Palliative care team  DVT PPX: [  ] Lovenox, [  ] S C Heparin, [  ] Coumadin, [  ] Xarelto, [  ] Eliquis, [  ] Pradaxa, [  ] IV Heparin drip, [ x ] SCD [ x ] Contraindication 2 to GI Bleed,[  ] Ambulation [  ] Contraindicated 2 to  bleed [  ] Contraindicated 2 to Brain Bleed  Advanced directive: [ x ] None, [  ] DNR/DNI

## 2022-04-02 NOTE — PROGRESS NOTE ADULT - PROBLEM SELECTOR PLAN 1
Patient presents with abdominal pain, rectal bleeding, fatigue likely 2/2 diverticular bleed  - H/H 9.2 on admission, baseline hemoglobin ~10 HOLD Eliquis   - Hb 7.6 4/1; s/p 2 unit of prbc 03/31; s/p 1 unit pRBC 4/1; Hb 7/6 today --> ordering 1 u prbc, will f/u post-transfusion H/H  - CT a/p: diverticulosis without diverticulitis  - advanced to full liquid diet this morning - pt requesting regular diet - will f/u GI recs re: diet before advancing further    - Protonix 40 mg qD  - Hold antiplatelets, NSAIDs   - B12, folate wnl; iron panel w low serum iron   - GI (Dr. Bah) consulted, recs appreciated. colonoscopy 4/1 revealed old blood and diverticuli but no active bleeding Patient presents with abdominal pain, rectal bleeding, fatigue likely 2/2 diverticular bleed  - H/H 9.2 on admission, baseline hemoglobin ~10,  HOLD Eliquis   - Hb 7.6 4/1; s/p 2 unit of prbc 03/31; s/p 1 unit pRBC 4/1; Hb 7/6 today --> ordering 1 u prbc, will f/u post-transfusion H/H  - CT a/p: diverticulosis without diverticulitis  - advanced to full liquid diet this morning - pt requesting regular diet - will f/u GI recs re: diet before advancing further    - Protonix 40 mg qD  - Hold antiplatelets, NSAIDs   - B12, folate wnl; iron panel w low serum iron   - GI (Dr. Bah) consulted, recs appreciated. colonoscopy 4/1 revealed old blood and diverticuli but no active bleeding

## 2022-04-02 NOTE — PROGRESS NOTE ADULT - SUBJECTIVE AND OBJECTIVE BOX
Patient is a 77y old  Male who presents with a chief complaint of Rectal bleed (01 Apr 2022 13:32)    Patient seen in follow up for CKD.        PAST MEDICAL HISTORY:  Hypertension    Diabetes mellitus    Lupus    Hepatitis C    Anxiety and depression    CAD (coronary artery disease)    Diverticulosis    Hyperlipidemia    HTN (hypertension)    HLD (hyperlipidemia)    Atrial fibrillation    CAD (coronary artery disease)    Type 2 diabetes mellitus    Anxiety    History of diverticulitis    Diverticulosis    Afib    Stage 3 chronic kidney disease    Anemia of chronic disease    Chronic diastolic congestive heart failure    Multiple thyroid nodules      MEDICATIONS  (STANDING):  amLODIPine   Tablet 10 milliGRAM(s) Oral daily  atorvastatin 10 milliGRAM(s) Oral at bedtime  budesonide 160 MICROgram(s)/formoterol 4.5 MICROgram(s) Inhaler 2 Puff(s) Inhalation two times a day  carvedilol 6.25 milliGRAM(s) Oral every 12 hours  cloNIDine 0.2 milliGRAM(s) Oral three times a day  cyanocobalamin 1000 MICROGram(s) Oral daily  dextrose 5%. 1000 milliLiter(s) (50 mL/Hr) IV Continuous <Continuous>  dextrose 5%. 1000 milliLiter(s) (100 mL/Hr) IV Continuous <Continuous>  dextrose 50% Injectable 25 Gram(s) IV Push once  dextrose 50% Injectable 12.5 Gram(s) IV Push once  dextrose 50% Injectable 25 Gram(s) IV Push once  doxazosin 4 milliGRAM(s) Oral <User Schedule>  folic acid 1 milliGRAM(s) Oral daily  furosemide    Tablet 60 milliGRAM(s) Oral two times a day  glucagon  Injectable 1 milliGRAM(s) IntraMuscular once  hydrALAZINE 100 milliGRAM(s) Oral three times a day  insulin lispro (ADMELOG) corrective regimen sliding scale   SubCutaneous three times a day before meals  insulin lispro (ADMELOG) corrective regimen sliding scale   SubCutaneous at bedtime  isosorbide   mononitrate ER Tablet (IMDUR) 90 milliGRAM(s) Oral daily  lamoTRIgine 100 milliGRAM(s) Oral daily  oxybutynin XL 15 milliGRAM(s) Oral daily  pantoprazole  Injectable 40 milliGRAM(s) IV Push daily  polyethylene glycol 3350 17 Gram(s) Oral daily  potassium chloride    Tablet ER 40 milliEquivalent(s) Oral every 4 hours  senna 2 Tablet(s) Oral at bedtime  venlafaxine XR. 150 milliGRAM(s) Oral daily    MEDICATIONS  (PRN):  acetaminophen     Tablet .. 650 milliGRAM(s) Oral every 6 hours PRN Temp greater or equal to 38C (100.4F), Mild Pain (1 - 3)  aluminum hydroxide/magnesium hydroxide/simethicone Suspension 30 milliLiter(s) Oral every 4 hours PRN Dyspepsia  dextrose Oral Gel 15 Gram(s) Oral once PRN Blood Glucose LESS THAN 70 milliGRAM(s)/deciliter  melatonin 3 milliGRAM(s) Oral at bedtime PRN Insomnia  ondansetron Injectable 4 milliGRAM(s) IV Push every 8 hours PRN Nausea and/or Vomiting    T(C): 36.7 (04-02-22 @ 06:11), Max: 36.8 (03-31-22 @ 20:30)  HR: 63 (04-02-22 @ 08:27) (56 - 70)  BP: 131/72 (04-02-22 @ 08:27) (111/58 - 164/81)  RR: 18 (04-02-22 @ 06:11)  SpO2: 95% (04-02-22 @ 06:11)  Wt(kg): --  I&O's Detail          PHYSICAL EXAM:  General: No distress  Respiratory: b/l air entry  Cardiovascular: S1 S2  Gastrointestinal: soft  Extremities:  edema                               LABORATORY:                        7.6    5.29  )-----------( 157      ( 02 Apr 2022 08:58 )             22.5     04-02    144  |  111<H>  |  26<H>  ----------------------------<  174<H>  3.2<L>   |  23  |  1.90<H>    Ca    8.9      02 Apr 2022 08:58  Phos  3.4     04-02  Mg     2.0     04-02    TPro  5.2<L>  /  Alb  2.9<L>  /  TBili  0.3  /  DBili  x   /  AST  11<L>  /  ALT  13  /  AlkPhos  49  04-01    Sodium, Serum: 144 mmol/L (04-02 @ 08:58)  Sodium, Serum: 144 mmol/L (04-01 @ 08:02)    Potassium, Serum: 3.2 mmol/L (04-02 @ 08:58)  Potassium, Serum: 3.3 mmol/L (04-01 @ 08:02)    Hemoglobin: 7.6 g/dL (04-02 @ 08:58)  Hemoglobin: 8.2 g/dL (04-01 @ 15:57)  Hemoglobin: 7.9 g/dL (04-01 @ 13:56)  Hemoglobin: 7.6 g/dL (04-01 @ 08:02)    Creatinine, Serum 1.90 (04-02 @ 08:58)  Creatinine, Serum 2.10 (04-01 @ 08:02)  Creatinine, Serum 2.30 (03-31 @ 08:09)        LIVER FUNCTIONS - ( 01 Apr 2022 08:02 )  Alb: 2.9 g/dL / Pro: 5.2 g/dL / ALK PHOS: 49 U/L / ALT: 13 U/L / AST: 11 U/L / GGT: x

## 2022-04-02 NOTE — PHYSICAL THERAPY INITIAL EVALUATION ADULT - PERTINENT HX OF CURRENT PROBLEM, REHAB EVAL
76 y/o male with a pmhx of CKD, Afib, diverticulitis, and history of blood clots in the brain [facility] due to dark stools. He noticed "black tarry stools painting the bowel".

## 2022-04-02 NOTE — PROGRESS NOTE ADULT - SUBJECTIVE AND OBJECTIVE BOX
Interval History:  remains weak  nurse reports no further bleeding  received 1 unit PRBC this morning    Chart reviewed and events noted;   Overnight events:    MEDICATIONS  (STANDING):  amLODIPine   Tablet 10 milliGRAM(s) Oral daily  atorvastatin 10 milliGRAM(s) Oral at bedtime  budesonide 160 MICROgram(s)/formoterol 4.5 MICROgram(s) Inhaler 2 Puff(s) Inhalation two times a day  carvedilol 6.25 milliGRAM(s) Oral every 12 hours  cloNIDine 0.2 milliGRAM(s) Oral three times a day  cyanocobalamin 1000 MICROGram(s) Oral daily  dextrose 5%. 1000 milliLiter(s) (50 mL/Hr) IV Continuous <Continuous>  dextrose 5%. 1000 milliLiter(s) (100 mL/Hr) IV Continuous <Continuous>  dextrose 50% Injectable 25 Gram(s) IV Push once  dextrose 50% Injectable 12.5 Gram(s) IV Push once  dextrose 50% Injectable 25 Gram(s) IV Push once  doxazosin 4 milliGRAM(s) Oral <User Schedule>  folic acid 1 milliGRAM(s) Oral daily  furosemide    Tablet 60 milliGRAM(s) Oral two times a day  glucagon  Injectable 1 milliGRAM(s) IntraMuscular once  hydrALAZINE 100 milliGRAM(s) Oral three times a day  insulin lispro (ADMELOG) corrective regimen sliding scale   SubCutaneous three times a day before meals  insulin lispro (ADMELOG) corrective regimen sliding scale   SubCutaneous at bedtime  isosorbide   mononitrate ER Tablet (IMDUR) 90 milliGRAM(s) Oral daily  lamoTRIgine 100 milliGRAM(s) Oral daily  oxybutynin XL 15 milliGRAM(s) Oral daily  pantoprazole  Injectable 40 milliGRAM(s) IV Push daily  polyethylene glycol 3350 17 Gram(s) Oral daily  potassium chloride    Tablet ER 40 milliEquivalent(s) Oral every 4 hours  senna 2 Tablet(s) Oral at bedtime  venlafaxine XR. 150 milliGRAM(s) Oral daily    MEDICATIONS  (PRN):  acetaminophen     Tablet .. 650 milliGRAM(s) Oral every 6 hours PRN Temp greater or equal to 38C (100.4F), Mild Pain (1 - 3)  aluminum hydroxide/magnesium hydroxide/simethicone Suspension 30 milliLiter(s) Oral every 4 hours PRN Dyspepsia  dextrose Oral Gel 15 Gram(s) Oral once PRN Blood Glucose LESS THAN 70 milliGRAM(s)/deciliter  melatonin 3 milliGRAM(s) Oral at bedtime PRN Insomnia  ondansetron Injectable 4 milliGRAM(s) IV Push every 8 hours PRN Nausea and/or Vomiting      Vital Signs Last 24 Hrs  T(C): 36.7 (02 Apr 2022 06:11), Max: 36.7 (01 Apr 2022 11:59)  T(F): 98.1 (02 Apr 2022 06:11), Max: 98.1 (01 Apr 2022 13:29)  HR: 63 (02 Apr 2022 08:27) (56 - 64)  BP: 131/72 (02 Apr 2022 08:27) (123/75 - 143/80)  BP(mean): --  RR: 18 (02 Apr 2022 06:11) (16 - 18)  SpO2: 95% (02 Apr 2022 06:11) (94% - 97%)    PHYSICAL EXAM  General: adult in NAD  HEENT: clear oropharynx, anicteric sclera, pink conjunctivae  Neck: supple  CV: normal S1S2 with no murmur rubs or gallops  Lungs: clear to auscultation, no wheezes, no rhales  Abdomen: soft non-tender non-distended, no hepato/splenomegaly  Ext: no clubbing cyanosis or edema  Skin: no rashes and no petichiae  Neuro: alert and oriented X3 no focal deficits      LABS:  CBC Full  -  ( 02 Apr 2022 08:58 )  WBC Count : 5.29 K/uL  RBC Count : 2.63 M/uL  Hemoglobin : 7.6 g/dL  Hematocrit : 22.5 %  Platelet Count - Automated : 157 K/uL  Mean Cell Volume : 85.6 fl  Mean Cell Hemoglobin : 28.9 pg  Mean Cell Hemoglobin Concentration : 33.8 gm/dL  Auto Neutrophil # : x  Auto Lymphocyte # : x  Auto Monocyte # : x  Auto Eosinophil # : x  Auto Basophil # : x  Auto Neutrophil % : x  Auto Lymphocyte % : x  Auto Monocyte % : x  Auto Eosinophil % : x  Auto Basophil % : x    04-02    144  |  111<H>  |  26<H>  ----------------------------<  174<H>  3.2<L>   |  23  |  1.90<H>    Ca    8.9      02 Apr 2022 08:58  Phos  3.4     04-02  Mg     2.0     04-02    TPro  5.2<L>  /  Alb  2.9<L>  /  TBili  0.3  /  DBili  x   /  AST  11<L>  /  ALT  13  /  AlkPhos  49  04-01    PT/INR - ( 31 Mar 2022 16:46 )   PT: 17.7 sec;   INR: 1.51 ratio             fe studies  Iron - Total Binding Capacity.: 230 ug/dL (03-31 @ 11:49)  Ferritin, Serum: 40 ng/mL (03-31 @ 11:49)      WBC trend  5.29 K/uL (04-02-22 @ 08:58)  4.62 K/uL (04-01-22 @ 08:02)  5.14 K/uL (03-31-22 @ 08:09)      Hgb trend  7.6 g/dL (04-02-22 @ 08:58)  8.2 g/dL (04-01-22 @ 15:57)  7.9 g/dL (04-01-22 @ 13:56)  7.6 g/dL (04-01-22 @ 08:02)  8.2 g/dL (03-31-22 @ 18:30)  7.1 g/dL (03-31-22 @ 08:09)      plt trend  157 K/uL (04-02-22 @ 08:58)  161 K/uL (04-01-22 @ 08:02)  200 K/uL (03-31-22 @ 08:09)        RADIOLOGY & ADDITIONAL STUDIES:

## 2022-04-02 NOTE — PROGRESS NOTE ADULT - SUBJECTIVE AND OBJECTIVE BOX
[facility] -- Fifi Bliss, Kalpesh Valdovinos, Abraham Sheridan Savella, [place]  Office # [phone]    Follow Up: Afib on Eliquis, now with GIB, VHD     Subjective/Observations: C/o dizziness when OOB.  Denies SOB, VALDEZ.  Tolerating RA.  Denies chest pain or palpitations    REVIEW OF SYSTEMS: All other review of systems is negative unless indicated above  PAST MEDICAL & SURGICAL HISTORY:  HTN (hypertension)  c/b multiple episodes of hypertensive urgency    HLD (hyperlipidemia)    Atrial fibrillation  first documented on EKG 10/7/2021    CAD (coronary artery disease)  s/p stents (not on plavix)    Type 2 diabetes mellitus  not on home insulin/ Meds    Anxiety  Depression  multiple psych medications    History of diverticulitis  07/2021    Diverticulosis  c/b GIB in 2020    Afib  on AC    Stage 3 chronic kidney disease    Anemia of chronic disease  Monoclonal Gammopathy-MGUS  pRBC transfusion 10/15/21    Chronic diastolic congestive heart failure    Multiple thyroid nodules    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  Had surgery ( April 2013 )    S/P tonsillectomy    S/P arthroscopic knee surgery  Bilateral ( 2005 )    Torsion of testicle  Had surgery at age 14    Pilonidal cyst  Had surgery ( 1969 )    S/P cataract surgery  Bilateral    H/O hernia repair    MEDICATIONS  (STANDING):  amLODIPine   Tablet 10 milliGRAM(s) Oral daily  atorvastatin 10 milliGRAM(s) Oral at bedtime  budesonide 160 MICROgram(s)/formoterol 4.5 MICROgram(s) Inhaler 2 Puff(s) Inhalation two times a day  carvedilol 6.25 milliGRAM(s) Oral every 12 hours  cloNIDine 0.2 milliGRAM(s) Oral three times a day  cyanocobalamin 1000 MICROGram(s) Oral daily  dextrose 5%. 1000 milliLiter(s) (50 mL/Hr) IV Continuous <Continuous>  dextrose 5%. 1000 milliLiter(s) (100 mL/Hr) IV Continuous <Continuous>  dextrose 50% Injectable 25 Gram(s) IV Push once  dextrose 50% Injectable 12.5 Gram(s) IV Push once  dextrose 50% Injectable 25 Gram(s) IV Push once  doxazosin 4 milliGRAM(s) Oral <User Schedule>  folic acid 1 milliGRAM(s) Oral daily  furosemide    Tablet 60 milliGRAM(s) Oral two times a day  glucagon  Injectable 1 milliGRAM(s) IntraMuscular once  hydrALAZINE 100 milliGRAM(s) Oral three times a day  insulin lispro (ADMELOG) corrective regimen sliding scale   SubCutaneous three times a day before meals  insulin lispro (ADMELOG) corrective regimen sliding scale   SubCutaneous at bedtime  isosorbide   mononitrate ER Tablet (IMDUR) 90 milliGRAM(s) Oral daily  lamoTRIgine 100 milliGRAM(s) Oral daily  oxybutynin XL 15 milliGRAM(s) Oral daily  pantoprazole  Injectable 40 milliGRAM(s) IV Push daily  polyethylene glycol 3350 17 Gram(s) Oral daily  potassium chloride    Tablet ER 40 milliEquivalent(s) Oral every 4 hours  senna 2 Tablet(s) Oral at bedtime  venlafaxine XR. 150 milliGRAM(s) Oral daily    MEDICATIONS  (PRN):  acetaminophen     Tablet .. 650 milliGRAM(s) Oral every 6 hours PRN Temp greater or equal to 38C (100.4F), Mild Pain (1 - 3)  aluminum hydroxide/magnesium hydroxide/simethicone Suspension 30 milliLiter(s) Oral every 4 hours PRN Dyspepsia  dextrose Oral Gel 15 Gram(s) Oral once PRN Blood Glucose LESS THAN 70 milliGRAM(s)/deciliter  melatonin 3 milliGRAM(s) Oral at bedtime PRN Insomnia  ondansetron Injectable 4 milliGRAM(s) IV Push every 8 hours PRN Nausea and/or Vomiting    Allergies    No Known Allergies    Intolerances    Vital Signs Last 24 Hrs  T(C): 36.7 (02 Apr 2022 06:11), Max: 36.7 (01 Apr 2022 11:59)  T(F): 98.1 (02 Apr 2022 06:11), Max: 98.1 (01 Apr 2022 13:29)  HR: 63 (02 Apr 2022 08:27) (56 - 64)  BP: 131/72 (02 Apr 2022 08:27) (123/75 - 143/80)  BP(mean): --  RR: 18 (02 Apr 2022 06:11) (16 - 18)  SpO2: 95% (02 Apr 2022 06:11) (94% - 97%)  I&O's Summary      PHYSICAL EXAM:  TELE: Not on tele  Constitutional: NAD, awake and alert, obese  HEENT: Moist Mucous Membranes, Anicteric  Pulmonary: Non-labored, breath sounds are clear bilaterally, No wheezing, rales or rhonchi  Cardiovascular: IRRR, S1 and S2, +murmurs, no rubs, gallops or clicks  Gastrointestinal: Bowel Sounds present, soft, nontender.   Lymph: No peripheral edema. No lymphadenopathy.  Skin: No visible rashes or ulcers.  Pale in color  Psych:  Mood & affect appropriate  LABS: All Labs Reviewed:                        7.6    5.29  )-----------( 157      ( 02 Apr 2022 08:58 )             22.5                         8.2    x     )-----------( x        ( 01 Apr 2022 15:57 )             23.9                         7.9    x     )-----------( x        ( 01 Apr 2022 13:56 )             23.5     02 Apr 2022 08:58    144    |  111    |  26     ----------------------------<  174    3.2     |  23     |  1.90   01 Apr 2022 08:02    144    |  108    |  38     ----------------------------<  117    3.3     |  24     |  2.10   31 Mar 2022 08:09    140    |  105    |  46     ----------------------------<  120    3.3     |  26     |  2.30     Ca    8.9        02 Apr 2022 08:58  Ca    8.9        01 Apr 2022 08:02  Ca    8.9        31 Mar 2022 08:09  Phos  3.4       02 Apr 2022 08:58  Phos  3.6       01 Apr 2022 08:02  Mg     2.0       02 Apr 2022 08:58  Mg     2.3       01 Apr 2022 08:02    TPro  5.2    /  Alb  2.9    /  TBili  0.3    /  DBili  x      /  AST  11     /  ALT  13     /  AlkPhos  49     01 Apr 2022 08:02  TPro  5.5    /  Alb  2.9    /  TBili  0.2    /  DBili  x      /  AST  10     /  ALT  13     /  AlkPhos  51     31 Mar 2022 08:09    PT/INR - ( 31 Mar 2022 16:46 )   PT: 17.7 sec;   INR: 1.51 ratio         EXAM:  ECHO TTE WO CON COMP W DOPP         PROCEDURE DATE:  01/06/2022        INTERPRETATION:  INDICATION: Dyspnea  Sonographer PH    Blood Pressure 177/86    Height 172.7 cm     Weight 104.4 kg    Dimensions:  LA 4.2       Normal Values: 2.0 - 4.0 cm  Ao 3.7        Normal Values: 2.0 - 3.8 cm  SEPTUM 1.4       Normal Values: 0.6 - 1.2 cm  PWT 1.4       Normal Values: 0.6 - 1.1 cm  LVIDd 5.2         Normal Values: 3.0 - 5.6 cm  LVIDs 4.2         Normal Values: 1.8 - 4.0 cm    OBSERVATIONS:  Technically difficult study  Mitral Valve: Mitral annular calcification with thickened leaflets, mild   MR.  Aortic Valve/Aorta: Calcified trileaflet aortic valve with decreased   opening. Peak transaortic valve gradient is 29.4 mmHg with a mean   transaortic valve gradient 14.7 mmHg. This consistent with mild aortic   stenosis.  Tricuspid Valve: Mild TR.  Pulmonic Valve: Not well-visualized  Left Atrium: Enlarged  Right Atrium: Not well-visualized  Left Ventricle: Moderate left ventricular hypertrophy with normal   systolic function, estimated LVEF of 55%.  Right Ventricle: Grossly normal size and systolic function.  Pericardium: no significant pericardial effusion.  Pulmonary/RV Pressure: estimated PA systolic pressure of 32mmHg    IMPRESSION:  Technically difficult study  Moderate left ventricular hypertrophy with normal systolic function,   estimated LVEF of 55%.  Grossly normal RV size and systolic function.  Calcified trileaflet aortic valve with mild aortic stenosis  Mild MR and TR.  No significant pericardial effusion.    --- End of Report ---      VIET GREENWOOD MD; Attending Cardiologist  This document has been electronically signed. Jan 7 2022 11:19AM      ACC: [id number] EXAM:  CT ABDOMEN AND PELVIS                        ACC: [id number] EXAM:  CT CHEST                          PROCEDURE DATE:  03/30/2022          INTERPRETATION:  CLINICAL INFORMATION: Rectal bleed and shortness of   breath    COMPARISON: CT chest 10/18/2021. CT chest abdomen pelvis 10/7/2021. Right   upper quadrant ultrasound 8/20/2021.    CONTRAST/COMPLICATIONS:  IV Contrast: NONE  Oral Contrast: NONE  Complications: None reported at time of study completion    PROCEDURE:  CT of the Chest, Abdomen and Pelvis was performed.  Sagittal and coronal reformats were performed.    FINDINGS:    CHEST:  LUNGS AND LARGE AIRWAYS: Central airways are patent. No focal   consolidation. Dependent scarring and minimal groundglass. Few scattered   2 mm pulmonary nodules. For reference, right upper lobe 2 mm nodule image   29 series 4, right lower lobe 2 mm nodule image 88 series 4, left upper   lobe 2 mm nodule image 26 series 4.  PLEURA: No pleural effusion or pneumothorax.  VESSELS: Atheromatous changes of the thoracic aorta. Main pulmonary   artery size is mildly enlarged, 3.2 cm.  HEART: Qualitatively, heart size is prominent. Aortic root/valve   calcification and coronary artery calcification. Trace pericardial fluid.  MEDIASTINUM AND SANDIP: No enlarged lymph nodes of the thorax. Small fluid   of the right infrahilar region near the right inferior pulmonary vein is   unchanged on multiple prior studies dating back to July 2021, possibly a   pericardial cyst or recess. Esophagus is underdistended.  CHEST WALL AND LOWER NECK: No chest wall hematoma.    ABDOMEN AND PELVIS:    Solid organ evaluation limited due to noncontrast technique.    LIVER: Borderline enlarged liver measuring 18 cm in craniocaudal   dimension.  BILE DUCTS:No biliary distention  GALLBLADDER: Unremarkable CT appearance  SPLEEN: Normal size  PANCREAS: No main ductal dilatation  ADRENALS: Left adrenal gland thickening, similar to prior  KIDNEYS/URETERS: No hydronephrosis. Renal cysts, suboptimally   characterized due to lack of contrast.    BLADDER: Minimally distended.  REPRODUCTIVE ORGANS: Few coarse calcifications of the prostate    BOWEL: Stomach is underdistended. No small bowel distention. Normal   appendix. Mild stool burden of the colon limitsevaluation of the colonic   mucosa. Colonic diverticulosis, without evidence of acute diverticulitis.  PERITONEUM: No ascites.  VESSELS: No aneurysm of the abdominal aorta. Aortoiliac atheromatous   change.  RETROPERITONEUM/LYMPH NODES: Small volume nodes.  ABDOMINAL WALL: Postprocedural change at the umbilicus. New left   iliopsoas muscle calcification, image 158 series 2 since 10/6/2021.   Question prior injury / myositis ossificans.  BONES: Degenerative changes. Old left lateral rib fractures.    IMPRESSION:    Noncontrast study.    CHEST:  1. No large consolidation. Dependent scarring and minimal groundglass.  2. Few subcentimeter 2 mm nodules can be followed with dedicated chest CT   in 12 months based on patient risk factors.  3. Coronary artery calcification. Aortic root/valve calcification.    ABDOMEN/PELVIS:  1. Colonic diverticulosis, without diverticulitis. Given the history of   rectal bleeding, correlate with hemodynamics and hematocrit to guide   further management.    --- End of Report ---    JAZ OCONNOR M.D., ATTENDING RADIOLOGIST  This document has been electronically signed. Mar 30 2022 12:50PM    Ventricular Rate 60 BPM    Atrial Rate 60 BPM    P-R Interval 268 ms    QRS Duration 114 ms    Q-T Interval 456 ms    QTC Calculation(Bazett) 456 ms    R Axis -11 degrees    T Axis 214 degrees    Diagnosis Line Sinus rhythm with 1st degree AV block  Incomplete right bundle branch block  Minimal voltage criteria for LVH, may be normal variant ( [place] product )  ST & T wave abnormality, consider lateral ischemia  Abnormal ECG  When compared with ECG of 05-FEB-2022 10:34,  Inverted T waves have replaced nonspecific T wave abnormality in Lateral leads  Confirmed by Bonifacio BARRERA, Victorino ([id number]) on 3/31/2022 10:45:41 AM

## 2022-04-03 LAB
ALBUMIN SERPL ELPH-MCNC: 3 G/DL — LOW (ref 3.3–5)
ALP SERPL-CCNC: 52 U/L — SIGNIFICANT CHANGE UP (ref 40–120)
ALT FLD-CCNC: 16 U/L — SIGNIFICANT CHANGE UP (ref 12–78)
ANION GAP SERPL CALC-SCNC: 8 MMOL/L — SIGNIFICANT CHANGE UP (ref 5–17)
AST SERPL-CCNC: 17 U/L — SIGNIFICANT CHANGE UP (ref 15–37)
BASOPHILS # BLD AUTO: 0.02 K/UL — SIGNIFICANT CHANGE UP (ref 0–0.2)
BASOPHILS NFR BLD AUTO: 0.3 % — SIGNIFICANT CHANGE UP (ref 0–2)
BILIRUB SERPL-MCNC: 0.2 MG/DL — SIGNIFICANT CHANGE UP (ref 0.2–1.2)
BUN SERPL-MCNC: 24 MG/DL — HIGH (ref 7–23)
CALCIUM SERPL-MCNC: 9.2 MG/DL — SIGNIFICANT CHANGE UP (ref 8.5–10.1)
CHLORIDE SERPL-SCNC: 111 MMOL/L — HIGH (ref 96–108)
CO2 SERPL-SCNC: 27 MMOL/L — SIGNIFICANT CHANGE UP (ref 22–31)
CREAT SERPL-MCNC: 1.9 MG/DL — HIGH (ref 0.5–1.3)
EGFR: 36 ML/MIN/1.73M2 — LOW
EOSINOPHIL # BLD AUTO: 0.21 K/UL — SIGNIFICANT CHANGE UP (ref 0–0.5)
EOSINOPHIL NFR BLD AUTO: 3.6 % — SIGNIFICANT CHANGE UP (ref 0–6)
GLUCOSE SERPL-MCNC: 107 MG/DL — HIGH (ref 70–99)
HCT VFR BLD CALC: 25.8 % — LOW (ref 39–50)
HGB BLD-MCNC: 8.7 G/DL — LOW (ref 13–17)
IMM GRANULOCYTES NFR BLD AUTO: 0.5 % — SIGNIFICANT CHANGE UP (ref 0–1.5)
LYMPHOCYTES # BLD AUTO: 1.28 K/UL — SIGNIFICANT CHANGE UP (ref 1–3.3)
LYMPHOCYTES # BLD AUTO: 22 % — SIGNIFICANT CHANGE UP (ref 13–44)
MAGNESIUM SERPL-MCNC: 2.3 MG/DL — SIGNIFICANT CHANGE UP (ref 1.6–2.6)
MCHC RBC-ENTMCNC: 29.3 PG — SIGNIFICANT CHANGE UP (ref 27–34)
MCHC RBC-ENTMCNC: 33.7 GM/DL — SIGNIFICANT CHANGE UP (ref 32–36)
MCV RBC AUTO: 86.9 FL — SIGNIFICANT CHANGE UP (ref 80–100)
MONOCYTES # BLD AUTO: 0.48 K/UL — SIGNIFICANT CHANGE UP (ref 0–0.9)
MONOCYTES NFR BLD AUTO: 8.2 % — SIGNIFICANT CHANGE UP (ref 2–14)
NEUTROPHILS # BLD AUTO: 3.81 K/UL — SIGNIFICANT CHANGE UP (ref 1.8–7.4)
NEUTROPHILS NFR BLD AUTO: 65.4 % — SIGNIFICANT CHANGE UP (ref 43–77)
NRBC # BLD: 0 /100 WBCS — SIGNIFICANT CHANGE UP (ref 0–0)
PHOSPHATE SERPL-MCNC: 3.8 MG/DL — SIGNIFICANT CHANGE UP (ref 2.5–4.5)
PLATELET # BLD AUTO: 199 K/UL — SIGNIFICANT CHANGE UP (ref 150–400)
POTASSIUM SERPL-MCNC: 3.4 MMOL/L — LOW (ref 3.5–5.3)
POTASSIUM SERPL-SCNC: 3.4 MMOL/L — LOW (ref 3.5–5.3)
PROT SERPL-MCNC: 5.6 G/DL — LOW (ref 6–8.3)
RBC # BLD: 2.97 M/UL — LOW (ref 4.2–5.8)
RBC # FLD: 16.5 % — HIGH (ref 10.3–14.5)
SODIUM SERPL-SCNC: 146 MMOL/L — HIGH (ref 135–145)
WBC # BLD: 5.83 K/UL — SIGNIFICANT CHANGE UP (ref 3.8–10.5)
WBC # FLD AUTO: 5.83 K/UL — SIGNIFICANT CHANGE UP (ref 3.8–10.5)

## 2022-04-03 PROCEDURE — 99232 SBSQ HOSP IP/OBS MODERATE 35: CPT

## 2022-04-03 RX ORDER — POTASSIUM CHLORIDE 20 MEQ
20 PACKET (EA) ORAL DAILY
Refills: 0 | Status: DISCONTINUED | OUTPATIENT
Start: 2022-04-04 | End: 2022-04-04

## 2022-04-03 RX ORDER — POTASSIUM CHLORIDE 20 MEQ
40 PACKET (EA) ORAL ONCE
Refills: 0 | Status: COMPLETED | OUTPATIENT
Start: 2022-04-03 | End: 2022-04-03

## 2022-04-03 RX ADMIN — AMLODIPINE BESYLATE 10 MILLIGRAM(S): 2.5 TABLET ORAL at 06:47

## 2022-04-03 RX ADMIN — PREGABALIN 1000 MICROGRAM(S): 225 CAPSULE ORAL at 11:29

## 2022-04-03 RX ADMIN — Medication 1 MILLIGRAM(S): at 11:28

## 2022-04-03 RX ADMIN — Medication 150 MILLIGRAM(S): at 11:29

## 2022-04-03 RX ADMIN — Medication 0.2 MILLIGRAM(S): at 06:48

## 2022-04-03 RX ADMIN — ATORVASTATIN CALCIUM 10 MILLIGRAM(S): 80 TABLET, FILM COATED ORAL at 21:25

## 2022-04-03 RX ADMIN — PANTOPRAZOLE SODIUM 40 MILLIGRAM(S): 20 TABLET, DELAYED RELEASE ORAL at 11:29

## 2022-04-03 RX ADMIN — IRON SUCROSE 100 MILLIGRAM(S): 20 INJECTION, SOLUTION INTRAVENOUS at 11:30

## 2022-04-03 RX ADMIN — CARVEDILOL PHOSPHATE 6.25 MILLIGRAM(S): 80 CAPSULE, EXTENDED RELEASE ORAL at 06:48

## 2022-04-03 RX ADMIN — SENNA PLUS 2 TABLET(S): 8.6 TABLET ORAL at 21:23

## 2022-04-03 RX ADMIN — Medication 15 MILLIGRAM(S): at 11:29

## 2022-04-03 RX ADMIN — Medication 0.2 MILLIGRAM(S): at 21:23

## 2022-04-03 RX ADMIN — CARVEDILOL PHOSPHATE 6.25 MILLIGRAM(S): 80 CAPSULE, EXTENDED RELEASE ORAL at 17:53

## 2022-04-03 RX ADMIN — Medication 4 MILLIGRAM(S): at 08:41

## 2022-04-03 RX ADMIN — Medication 0.2 MILLIGRAM(S): at 14:17

## 2022-04-03 RX ADMIN — ISOSORBIDE MONONITRATE 90 MILLIGRAM(S): 60 TABLET, EXTENDED RELEASE ORAL at 11:29

## 2022-04-03 RX ADMIN — Medication 100 MILLIGRAM(S): at 14:16

## 2022-04-03 RX ADMIN — Medication 100 MILLIGRAM(S): at 21:23

## 2022-04-03 RX ADMIN — Medication 40 MILLIEQUIVALENT(S): at 11:28

## 2022-04-03 RX ADMIN — Medication 100 MILLIGRAM(S): at 06:47

## 2022-04-03 RX ADMIN — Medication 60 MILLIGRAM(S): at 06:47

## 2022-04-03 RX ADMIN — LAMOTRIGINE 100 MILLIGRAM(S): 25 TABLET, ORALLY DISINTEGRATING ORAL at 11:29

## 2022-04-03 RX ADMIN — Medication 4 MILLIGRAM(S): at 20:06

## 2022-04-03 RX ADMIN — Medication 60 MILLIGRAM(S): at 17:53

## 2022-04-03 NOTE — PROGRESS NOTE ADULT - SUBJECTIVE AND OBJECTIVE BOX
Patient is a 77y old  Male who presents with a chief complaint of Rectal bleed (02 Apr 2022 11:42)    HPI:  78 yo M PMH of A fib (on Eliquis) , MGUS s/p PRBC transfusion 10/21, anxiety/depression, CAD s/p stents (not on plavix), BPH, CHF, Diverticulosis, HLD, HTN, thyroid nodules, CKD stage 3, DM2 presents to the ED from [facility] after having up to 3 bowel movements with dark/black stool. Patient would have another episode of dark stool in the ED. Patient denied consuming any beets or magnesium/calcium supplements recently. Patient states that onset of stool was very sudden. Patient recently admitted to [facility] in January for CHF exacerbation, improved w/ diuresis. Patient was seen and examined at bedside, denied any headache, cp/pressure, palpitations, myalgias, fevers/chills, SOB, hematuria, dysuria. Patient endorses lightheadedness/dizziness, 4/10 non-radiating sharp periumbilical pain, and chronic knee pain.     In ED:   Vitals: 97.5F, HR 69, 133/81, RR 18, 96% on RA   Labs significant for: Occult blood +, GFR 29, INR 2.01, Creatinine 2.3 (baseline 2.2)  Imaging: CT chest: Few 2mm nodules, coronary artery calcification  CT abd/pelvis: diverticulosis w/o diverticulitis   CT head: No acute intracranial hemorrhage or acute territorial infarct   EKG: Sinus rhythm w/ 1st degree heart block @ 60bpm, incomplete RBBB unchanged from prior ekg   Recieved: Pantoprazole infusion   (30 Mar 2022 15:40)    INTERVAL HPI:   3/31/2022: Patient had two more bowel movements with dark stool from yesterday. VSS. Patient seen and examined at bedside, denies any headache, cp/pressure/palpitations, abd pain, dysuria/hematuria, SOB, nausea/vomiting, fevers/chills. Patient continues to endorse lightheadedness/dizziness, and fatigue. AM hgb showed that patients hgb had dropped to 7.1 from 9.2, patient to receive 2 units of PRBC.  04/01/2022: Pt episode of mild hematochezia this am. States he still experiences lightheadedness with dizziness. Denies fever, chills, chest pain, abd pain, sob, palpitations, n/v/d/c. Pt w Hb 7.6 this am. received a unit of prbc. NPO for colonoscopy. Colonoscopy done 4/1 that revealed old blood but no active bleeding. Hb did not have adequate response to prbc.  4/2/22: Patient seen and examined at bedside. No overnight events occurred. Pt says he wants regular food. He reports a mild headache but denies chest pain, SOB, abd pain, nausea, vomiting, fever, chills. Tolerating PO. Ambulated to bathroom. Pt DENIES further episodes of rectal bleeding - confirmed with RN, no reported bloody BMs. Last BM yesterday. Low H/H. Pt received 1u pRBC with good response Hb 7.6 --> 8.6. Pt advanced to full liquid diet.  4/3/22: Pt seen and examined at bedside. No overnight events occurred. Pt says he does not like the liquid diet and wants to eat solid food. He denies any further episodes of rectal bleeding but also reports he has not had a BM in 2 days. He reports a mild headache but denies fevers, chills, chest pain, dyspnea, abdominal pain, n/v/d/c. Has been OOB and ambulating. Denies dysuria.      OVERNIGHT EVENTS: NONE    Home Medications:  Aristada 1064 mg/3.9 mL intramuscular suspension, extended release: 1 dose(s) intramuscular every 8 weeks (30 Mar 2022 15:58)  carvedilol 6.25 mg oral tablet: 1 tab(s) orally 2 times a day (30 Mar 2022 15:58)  cloNIDine 0.2 mg oral tablet: 1 tab(s) orally 3 times a day (30 Mar 2022 15:58)  diclofenac 1% topical gel: Apply topically to affected area 3 times a day (30 Mar 2022 15:58)  doxazosin 4 mg oral tablet: 1 tab(s) orally 2 times a day (30 Mar 2022 15:58)  furosemide 20 mg oral tablet: 3 tab(s) orally 2 times a day (30 Mar 2022 15:58)  lactobacillus acidophilus oral capsule: 1 cap(s) orally 2 times a day (30 Mar 2022 15:58)  lamoTRIgine 100 mg oral tablet: 1 tab(s) orally once a day (30 Mar 2022 15:58)  oxybutynin 15 mg/24 hr oral tablet, extended release: 1 tab(s) orally once a day (30 Mar 2022 15:58)  Vitamin B-12 1000 mcg oral tablet: 1 tab(s) orally once a day (30 Mar 2022 15:58)      MEDICATIONS  (STANDING):  amLODIPine   Tablet 10 milliGRAM(s) Oral daily  atorvastatin 10 milliGRAM(s) Oral at bedtime  budesonide 160 MICROgram(s)/formoterol 4.5 MICROgram(s) Inhaler 2 Puff(s) Inhalation two times a day  carvedilol 6.25 milliGRAM(s) Oral every 12 hours  cloNIDine 0.2 milliGRAM(s) Oral three times a day  cyanocobalamin 1000 MICROGram(s) Oral daily  dextrose 5%. 1000 milliLiter(s) (100 mL/Hr) IV Continuous <Continuous>  dextrose 5%. 1000 milliLiter(s) (50 mL/Hr) IV Continuous <Continuous>  dextrose 50% Injectable 25 Gram(s) IV Push once  dextrose 50% Injectable 12.5 Gram(s) IV Push once  dextrose 50% Injectable 25 Gram(s) IV Push once  doxazosin 4 milliGRAM(s) Oral <User Schedule>  folic acid 1 milliGRAM(s) Oral daily  furosemide    Tablet 60 milliGRAM(s) Oral two times a day  glucagon  Injectable 1 milliGRAM(s) IntraMuscular once  hydrALAZINE 100 milliGRAM(s) Oral three times a day  insulin lispro (ADMELOG) corrective regimen sliding scale   SubCutaneous three times a day before meals  insulin lispro (ADMELOG) corrective regimen sliding scale   SubCutaneous at bedtime  iron sucrose Injectable 100 milliGRAM(s) IV Push every 24 hours  isosorbide   mononitrate ER Tablet (IMDUR) 90 milliGRAM(s) Oral daily  lamoTRIgine 100 milliGRAM(s) Oral daily  oxybutynin XL 15 milliGRAM(s) Oral daily  pantoprazole  Injectable 40 milliGRAM(s) IV Push daily  polyethylene glycol 3350 17 Gram(s) Oral daily  potassium chloride    Tablet ER 40 milliEquivalent(s) Oral once  senna 2 Tablet(s) Oral at bedtime  venlafaxine XR. 150 milliGRAM(s) Oral daily    MEDICATIONS  (PRN):  acetaminophen     Tablet .. 650 milliGRAM(s) Oral every 6 hours PRN Temp greater or equal to 38C (100.4F), Mild Pain (1 - 3)  aluminum hydroxide/magnesium hydroxide/simethicone Suspension 30 milliLiter(s) Oral every 4 hours PRN Dyspepsia  dextrose Oral Gel 15 Gram(s) Oral once PRN Blood Glucose LESS THAN 70 milliGRAM(s)/deciliter  melatonin 3 milliGRAM(s) Oral at bedtime PRN Insomnia  ondansetron Injectable 4 milliGRAM(s) IV Push every 8 hours PRN Nausea and/or Vomiting      Allergies    No Known Allergies    Intolerances        Social History:  Lives in [facility]   ADL independent   Ambulates w/ a walker   Former smoker, quit 45 years ago, ~8 pack years   Alcohol denies   Drugs denies   COVID Moderna x2 (30 Mar 2022 15:40)      REVIEW OF SYSTEMS:  CONSTITUTIONAL: No fever, No chills, No fatigue, No myalgia, No Body ache, No Weakness +headache  EYES: No eye pain,  No visual disturbances, No discharge, NO Redness  ENMT:  No ear pain, No nose bleed, No vertigo; No sinus pain, NO throat pain, No Congestion  NECK: No pain, No stiffness  RESPIRATORY: No cough, NO wheezing, No  hemoptysis, NO  shortness of breath  CARDIOVASCULAR: No chest pain, palpitations  GASTROINTESTINAL: No abdominal pain, NO epigastric pain. No nausea, No vomiting; No diarrhea, No constipation. [ x ] No BM  GENITOURINARY: No dysuria, No frequency, No urgency, No hematuria, NO incontinence  NEUROLOGICAL: No headaches, No dizziness, No numbness, No tingling, No tremors, No weakness  EXT: No Swelling, No Pain, No Edema  SKIN:  [ x ] No itching, burning, rashes, or lesions   MUSCULOSKELETAL: No joint pain ,No Jt swelling; No muscle pain, No back pain, No extremity pain  PSYCHIATRIC: No depression,  No anxiety,  No mood swings ,No difficulty sleeping at night  PAIN SCALE: [ x ] None  [  ] Other-  ROS Unable to obtain due to - [  ] Dementia  [  ] Lethargy [  ] Drowsy [  ] Sedated [  ] non verbal  REST OF REVIEW Of SYSTEM - [ x ] Normal       Vital Signs Last 24 Hrs  T(C): 36.4 (03 Apr 2022 05:01), Max: 37.1 (02 Apr 2022 20:42)  T(F): 97.6 (03 Apr 2022 05:01), Max: 98.8 (02 Apr 2022 20:42)  HR: 65 (03 Apr 2022 05:01) (61 - 78)  BP: 165/82 (03 Apr 2022 05:01) (106/65 - 165/82)  BP(mean): --  RR: 18 (03 Apr 2022 05:01) (17 - 18)  SpO2: 92% (03 Apr 2022 05:01) (92% - 95%)  Finger Stick          PHYSICAL EXAM:  GENERAL:  [ x ] NAD , [ x ] well appearing, [  ] Agitated, [  ] Drowsy,  [  ] Lethargy, [  ] confused   HEAD:  [ x ] Normal, [  ] Other  EYES:  [ x ] EOMI, [ x ] PERRLA, [ x ] conjunctiva and sclera clear normal, [  ] Other,  [  ] Pallor,[  ] Discharge  ENMT:  [ x ] Normal, [ x ] Moist mucous membranes, [  ] Good dentition, [  ] No Thrush  NECK:  [ x ] Supple, [ x ] No JVD, [  ] Normal thyroid, [  ] Lymphadenopathy [  ] Other  CHEST/LUNG:  [ x ] Clear to auscultation bilaterally, [ x ] Breath Sounds equal B/L, [  ] poor effort  [ x ] No rales, [ x ] No rhonchi  [ x ]  No wheezing,   HEART:  [ x ] Regular rate and rhythm, [  ] tachycardia, [  ] Bradycardia,  [  ] irregular  [ x ] systolic murmur, No rubs, No gallops, [  ] PPM in place (Mfr:  )  ABDOMEN:  [ x ] Soft, [ x ] Nontender, [ x ] Nondistended, [  ] No mass, [  ] Bowel sounds present, [ x ] obese  NERVOUS SYSTEM:  [ x ] Alert & Oriented X3, [ x ] Nonfocal  [  ] Confusion  [  ] Encephalopathic [  ] Sedated [  ] Unable to assess, [  ] Dementia [  ] Other-  EXTREMITIES: [  ] 2+ Peripheral Pulses, No clubbing, No cyanosis,  [ x ] No edema B/L lower EXT. [  ] PVD stasis skin changes B/L Lower EXT, [  ] wound  LYMPH: No lymphadenopathy noted  SKIN:  [ x ] No rashes or lesions, [  ] Pressure Ulcers, [  ] ecchymosis, [  ] Skin Tears, [  ] Other    DIET: Diet, Consistent Carbohydrate Full Liquid (04-02-22 @ 08:34)      LABS:                        8.7    5.83  )-----------( 199      ( 03 Apr 2022 07:59 )             25.8     03 Apr 2022 07:59    146    |  111    |  24     ----------------------------<  107    3.4     |  27     |  1.90     Ca    9.2        03 Apr 2022 07:59  Phos  3.8       03 Apr 2022 07:59  Mg     2.3       03 Apr 2022 07:59    TPro  5.6    /  Alb  3.0    /  TBili  0.2    /  DBili  x      /  AST  17     /  ALT  16     /  AlkPhos  52     03 Apr 2022 07:59                             Anemia Panel:  Iron Total, Serum: 33 ug/dL (03-31-22 @ 11:49)  Iron - Total Binding Capacity.: 230 ug/dL (03-31-22 @ 11:49)  Ferritin, Serum: 40 ng/mL (03-31-22 @ 11:49)  Folate, Serum: >20.0 ng/mL (03-31-22 @ 11:49)  Vitamin B12, Serum: 1442 pg/mL (03-31-22 @ 11:49)      Thyroid Panel:  Thyroid Stimulating Hormone, Serum: 1.19 uIU/mL (03-31-22 @ 08:09)                RADIOLOGY & ADDITIONAL TESTS:    No new imaging studies last 24 hours.    HEALTH ISSUES - PROBLEM Dx:  GIB (gastrointestinal bleeding)    Anxiety and depression    Type 2 diabetes mellitus    HTN (hypertension)    HLD (hyperlipidemia)    DVT prophylaxis    Anemia    CAD (coronary artery disease)    Atrial fibrillation    Chronic CHF    Stage 3 chronic kidney disease    Abnormal finding on lung imaging            Consultant(s) Notes Reviewed:  [ x ] YES     Care Discussed with [X] Consultants  [ x ] Patient  [  ] Family [  ] HCP [  ]   [  ] Social Service  [  ] RN, [  ] Physical Therapy,[  ] Palliative care team  DVT PPX: [  ] Lovenox, [  ] S C Heparin, [  ] Coumadin, [  ] Xarelto, [  ] Eliquis, [  ] Pradaxa, [  ] IV Heparin drip, [ x ] SCD [ x ] Contraindication 2 to GI Bleed,[  ] Ambulation [  ] Contraindicated 2 to  bleed [  ] Contraindicated 2 to Brain Bleed  Advanced directive: [ x ] None, [  ] DNR/DNI Patient is a 77y old  Male who presents with a chief complaint of Rectal bleed (02 Apr 2022 11:42)    HPI:  76 yo M PMH of A fib (on Eliquis) , 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 Patient would have another episode of dark stool in the ED. Patient denied consuming any beets or magnesium/calcium supplements recently. Patient states that onset of stool was very sudden. Patient recently admitted to [facility] in January for CHF exacerbation, improved w/ diuresis. Patient was seen and examined at bedside, denied any headache, cp/pressure, palpitations, myalgias, fevers/chills, SOB, hematuria, dysuria. Patient endorses lightheadedness/dizziness, 4/10 non-radiating sharp periumbilical pain, and chronic knee pain.     In ED:   Vitals: 97.5F, HR 69, 133/81, RR 18, 96% on RA   Labs significant for: Occult blood +, GFR 29, INR 2.01, Creatinine 2.3 (baseline 2.2)  Imaging: CT chest: Few 2mm nodules, coronary artery calcification  CT abd/pelvis: diverticulosis w/o diverticulitis   CT head: No acute intracranial hemorrhage or acute territorial infarct   EKG: Sinus rhythm w/ 1st degree heart block @ 60bpm, incomplete RBBB unchanged from prior ekg   Recieved: Pantoprazole infusion   (30 Mar 2022 15:40)    INTERVAL HPI:   3/31/2022: Patient had two more bowel movements with dark stool from yesterday. VSS. Patient seen and examined at bedside, denies any headache, cp/pressure/palpitations, abd pain, dysuria/hematuria, SOB, nausea/vomiting, fevers/chills. Patient continues to endorse lightheadedness/dizziness, and fatigue. AM hgb showed that patients hgb had dropped to 7.1 from 9.2, patient to receive 2 units of PRBC.  04/01/2022: Pt episode of mild hematochezia this am. States he still experiences lightheadedness with dizziness. Denies fever, chills, chest pain, abd pain, sob, palpitations, n/v/d/c. Pt w Hb 7.6 this am. received a unit of prbc. NPO for colonoscopy. Colonoscopy done 4/1 that revealed old blood but no active bleeding. Hb did not have adequate response to prbc.  4/2/22: Patient seen and examined at bedside. No overnight events occurred. Pt says he wants regular food. He reports a mild headache but denies chest pain, SOB, abd pain, nausea, vomiting, fever, chills. Tolerating PO. Ambulated to bathroom. Pt DENIES further episodes of rectal bleeding - confirmed with RN, no reported bloody BMs. Last BM yesterday. Low H/H. Pt received 1u pRBC with good response Hb 7.6 --> 8.6. Pt advanced to full liquid diet.  4/3/22: Pt seen and examined at bedside. No overnight events occurred. Pt says he does not like the liquid diet and wants to eat solid food. He denies any further episodes of rectal bleeding but also reports he has not had a BM in 2 days. He reports a mild headache but denies fevers, chills, chest pain, dyspnea, abdominal pain, n/v/d/c. Has been OOB and ambulating. Denies dysuria. H/JH Low stable.      OVERNIGHT EVENTS: NONE    Home Medications:  Aristada 1064 mg/3.9 mL intramuscular suspension, extended release: 1 dose(s) intramuscular every 8 weeks (30 Mar 2022 15:58)  carvedilol 6.25 mg oral tablet: 1 tab(s) orally 2 times a day (30 Mar 2022 15:58)  cloNIDine 0.2 mg oral tablet: 1 tab(s) orally 3 times a day (30 Mar 2022 15:58)  diclofenac 1% topical gel: Apply topically to affected area 3 times a day (30 Mar 2022 15:58)  doxazosin 4 mg oral tablet: 1 tab(s) orally 2 times a day (30 Mar 2022 15:58)  furosemide 20 mg oral tablet: 3 tab(s) orally 2 times a day (30 Mar 2022 15:58)  lactobacillus acidophilus oral capsule: 1 cap(s) orally 2 times a day (30 Mar 2022 15:58)  lamoTRIgine 100 mg oral tablet: 1 tab(s) orally once a day (30 Mar 2022 15:58)  oxybutynin 15 mg/24 hr oral tablet, extended release: 1 tab(s) orally once a day (30 Mar 2022 15:58)  Vitamin B-12 1000 mcg oral tablet: 1 tab(s) orally once a day (30 Mar 2022 15:58)      MEDICATIONS  (STANDING):  amLODIPine   Tablet 10 milliGRAM(s) Oral daily  atorvastatin 10 milliGRAM(s) Oral at bedtime  budesonide 160 MICROgram(s)/formoterol 4.5 MICROgram(s) Inhaler 2 Puff(s) Inhalation two times a day  carvedilol 6.25 milliGRAM(s) Oral every 12 hours  cloNIDine 0.2 milliGRAM(s) Oral three times a day  cyanocobalamin 1000 MICROGram(s) Oral daily  dextrose 5%. 1000 milliLiter(s) (100 mL/Hr) IV Continuous <Continuous>  dextrose 5%. 1000 milliLiter(s) (50 mL/Hr) IV Continuous <Continuous>  dextrose 50% Injectable 25 Gram(s) IV Push once  dextrose 50% Injectable 12.5 Gram(s) IV Push once  dextrose 50% Injectable 25 Gram(s) IV Push once  doxazosin 4 milliGRAM(s) Oral <User Schedule>  folic acid 1 milliGRAM(s) Oral daily  furosemide    Tablet 60 milliGRAM(s) Oral two times a day  glucagon  Injectable 1 milliGRAM(s) IntraMuscular once  hydrALAZINE 100 milliGRAM(s) Oral three times a day  insulin lispro (ADMELOG) corrective regimen sliding scale   SubCutaneous three times a day before meals  insulin lispro (ADMELOG) corrective regimen sliding scale   SubCutaneous at bedtime  iron sucrose Injectable 100 milliGRAM(s) IV Push every 24 hours  isosorbide   mononitrate ER Tablet (IMDUR) 90 milliGRAM(s) Oral daily  lamoTRIgine 100 milliGRAM(s) Oral daily  oxybutynin XL 15 milliGRAM(s) Oral daily  pantoprazole  Injectable 40 milliGRAM(s) IV Push daily  polyethylene glycol 3350 17 Gram(s) Oral daily  potassium chloride    Tablet ER 40 milliEquivalent(s) Oral once  senna 2 Tablet(s) Oral at bedtime  venlafaxine XR. 150 milliGRAM(s) Oral daily    MEDICATIONS  (PRN):  acetaminophen     Tablet .. 650 milliGRAM(s) Oral every 6 hours PRN Temp greater or equal to 38C (100.4F), Mild Pain (1 - 3)  aluminum hydroxide/magnesium hydroxide/simethicone Suspension 30 milliLiter(s) Oral every 4 hours PRN Dyspepsia  dextrose Oral Gel 15 Gram(s) Oral once PRN Blood Glucose LESS THAN 70 milliGRAM(s)/deciliter  melatonin 3 milliGRAM(s) Oral at bedtime PRN Insomnia  ondansetron Injectable 4 milliGRAM(s) IV Push every 8 hours PRN Nausea and/or Vomiting      Allergies    No Known Allergies    Intolerances        Social History:  Lives in [facility]   ADL independent   Ambulates w/ a walker   Former smoker, quit 45 years ago, ~8 pack years   Alcohol denies   Drugs denies   COVID Moderna x2 (30 Mar 2022 15:40)      REVIEW OF SYSTEMS: i want to eat regular food  CONSTITUTIONAL: No fever, No chills, No fatigue, No myalgia, No Body ache, No Weakness +headache  EYES: No eye pain,  No visual disturbances, No discharge, NO Redness  ENMT:  No ear pain, No nose bleed, No vertigo; No sinus pain, NO throat pain, No Congestion  NECK: No pain, No stiffness  RESPIRATORY: No cough, NO wheezing, No  hemoptysis, NO  shortness of breath  CARDIOVASCULAR: No chest pain, palpitations  GASTROINTESTINAL: No abdominal pain, NO epigastric pain. No nausea, No vomiting; No diarrhea, No constipation. [ x ] No BM  GENITOURINARY: No dysuria, No frequency, No urgency, No hematuria, NO incontinence  NEUROLOGICAL: No headaches, No dizziness, No numbness, No tingling, No tremors, No weakness  EXT: No Swelling, No Pain, No Edema  SKIN:  [ x ] No itching, burning, rashes, or lesions   MUSCULOSKELETAL: No joint pain ,No Jt swelling; No muscle pain, No back pain, No extremity pain  PSYCHIATRIC: No depression,  No anxiety,  No mood swings ,No difficulty sleeping at night  PAIN SCALE: [ x ] None  [  ] Other-  ROS Unable to obtain due to - [  ] Dementia  [  ] Lethargy [  ] Drowsy [  ] Sedated [  ] non verbal  REST OF REVIEW Of SYSTEM - [ x ] Normal       Vital Signs Last 24 Hrs  T(C): 36.4 (03 Apr 2022 05:01), Max: 37.1 (02 Apr 2022 20:42)  T(F): 97.6 (03 Apr 2022 05:01), Max: 98.8 (02 Apr 2022 20:42)  HR: 65 (03 Apr 2022 05:01) (61 - 78)  BP: 165/82 (03 Apr 2022 05:01) (106/65 - 165/82)  BP(mean): --  RR: 18 (03 Apr 2022 05:01) (17 - 18)  SpO2: 92% (03 Apr 2022 05:01) (92% - 95%)  Finger Stick          PHYSICAL EXAM:  GENERAL:  [ x ] NAD , [ x ] well appearing, [  ] Agitated, [  ] Drowsy,  [  ] Lethargy, [  ] confused   HEAD:  [ x ] Normal, [  ] Other  EYES:  [ x ] EOMI, [ x ] PERRLA, [ x ] conjunctiva and sclera clear normal, [  ] Other,  [ x ] Pallor,[  ] Discharge  ENMT:  [ x ] Normal, [ x ] Moist mucous membranes, [ x ] Good dentition, [ x ] No Thrush  NECK:  [ x ] Supple, [ x ] No JVD, [ x ] Normal thyroid, [  ] Lymphadenopathy [  ] Other  CHEST/LUNG:  [ x ] Clear to auscultation bilaterally, [ x ] Breath Sounds equal B/L, [  ] poor effort  [ x ] No rales, [ x ] No rhonchi  [ x ]  No wheezing,   HEART:  [ x ] Regular rate and rhythm, [  ] tachycardia, [  ] Bradycardia,  [  ] irregular  [ x ] systolic murmur, No rubs, No gallops, [  ] PPM in place (Mfr:  )  ABDOMEN:  [ x ] Soft, [ x ] Nontender, [ x ] Nondistended, [ x ] No mass, [ x ] Bowel sounds present, [ x ] obese  NERVOUS SYSTEM:  [ x ] Alert & Oriented X3, [ x ] Nonfocal  [  ] Confusion  [  ] Encephalopathic [  ] Sedated [  ] Unable to assess, [  ] Dementia [  ] Other-  EXTREMITIES: [  ] 2+ Peripheral Pulses, No clubbing, No cyanosis,  [ x ] No edema B/L lower EXT. [  ] PVD stasis skin changes B/L Lower EXT, [  ] wound  LYMPH: No lymphadenopathy noted  SKIN:  [ x ] No rashes or lesions, [  ] Pressure Ulcers, [  ] ecchymosis, [  ] Skin Tears, [  ] Other    DIET: Diet, Consistent Carbohydrate Full Liquid (04-02-22 @ 08:34)      LABS:                        8.7    5.83  )-----------( 199      ( 03 Apr 2022 07:59 )             25.8     03 Apr 2022 07:59    146    |  111    |  24     ----------------------------<  107    3.4     |  27     |  1.90     Ca    9.2        03 Apr 2022 07:59  Phos  3.8       03 Apr 2022 07:59  Mg     2.3       03 Apr 2022 07:59    TPro  5.6    /  Alb  3.0    /  TBili  0.2    /  DBili  x      /  AST  17     /  ALT  16     /  AlkPhos  52     03 Apr 2022 07:59    Anemia Panel:  Iron Total, Serum: 33 ug/dL (03-31-22 @ 11:49)  Iron - Total Binding Capacity.: 230 ug/dL (03-31-22 @ 11:49)  Ferritin, Serum: 40 ng/mL (03-31-22 @ 11:49)  Folate, Serum: >20.0 ng/mL (03-31-22 @ 11:49)  Vitamin B12, Serum: 1442 pg/mL (03-31-22 @ 11:49)      Thyroid Panel:  Thyroid Stimulating Hormone, Serum: 1.19 uIU/mL (03-31-22 @ 08:09)                RADIOLOGY & ADDITIONAL TESTS:    No new imaging studies last 24 hours.    HEALTH ISSUES - PROBLEM Dx:  GIB (gastrointestinal bleeding)    Anxiety and depression    Type 2 diabetes mellitus    HTN (hypertension)    HLD (hyperlipidemia)    DVT prophylaxis    Anemia    CAD (coronary artery disease)    Atrial fibrillation    Chronic CHF    Stage 3 chronic kidney disease    Abnormal finding on lung imaging            Consultant(s) Notes Reviewed:  [ x ] YES     Care Discussed with [X] Consultants  [ x ] Patient  [  ] Family [  ] HCP [  ]   [  ] Social Service  [  ] RN, [  ] Physical Therapy,[  ] Palliative care team  DVT PPX: [  ] Lovenox, [  ] S C Heparin, [  ] Coumadin, [  ] Xarelto, [  ] Eliquis, [  ] Pradaxa, [  ] IV Heparin drip, [ x ] SCD [ x ] Contraindication 2 to GI Bleed,[  ] Ambulation [  ] Contraindicated 2 to  bleed [  ] Contraindicated 2 to Brain Bleed  Advanced directive: [ x ] None, [  ] DNR/DNI

## 2022-04-03 NOTE — PROGRESS NOTE ADULT - SUBJECTIVE AND OBJECTIVE BOX
[facility] -- Fifi Bliss, Kalpesh Valdovinos, Abraham Sheridan Savella, [place]  Office # [phone]    Follow Up:  Afib on Eliquis, now with GIB, VHD      Subjective/Observations: Awake and alert, comfortable on side-lying position while on RA.  Denies any respiratory or cardiac discomfort.  However, still with melena x 1 overnight.  Denies dizziness or lightheadedness.      REVIEW OF SYSTEMS: All other review of systems is negative unless indicated above  PAST MEDICAL & SURGICAL HISTORY:  HTN (hypertension)  c/b multiple episodes of hypertensive urgency    HLD (hyperlipidemia)    Atrial fibrillation  first documented on EKG 10/7/2021    CAD (coronary artery disease)  s/p stents (not on plavix)    Type 2 diabetes mellitus  not on home insulin/ Meds    Anxiety  Depression  multiple psych medications    History of diverticulitis  07/2021    Diverticulosis  c/b GIB in 2020    Afib  on AC    Stage 3 chronic kidney disease    Anemia of chronic disease  Monoclonal Gammopathy-MGUS  pRBC transfusion 10/15/21    Chronic diastolic congestive heart failure    Multiple thyroid nodules    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  Had surgery ( April 2013 )    S/P tonsillectomy    S/P arthroscopic knee surgery  Bilateral ( 2005 )    Torsion of testicle  Had surgery at age 14    Pilonidal cyst  Had surgery ( 1969 )    S/P cataract surgery  Bilateral    H/O hernia repair    MEDICATIONS  (STANDING):  amLODIPine   Tablet 10 milliGRAM(s) Oral daily  atorvastatin 10 milliGRAM(s) Oral at bedtime  budesonide 160 MICROgram(s)/formoterol 4.5 MICROgram(s) Inhaler 2 Puff(s) Inhalation two times a day  carvedilol 6.25 milliGRAM(s) Oral every 12 hours  cloNIDine 0.2 milliGRAM(s) Oral three times a day  cyanocobalamin 1000 MICROGram(s) Oral daily  dextrose 5%. 1000 milliLiter(s) (100 mL/Hr) IV Continuous <Continuous>  dextrose 5%. 1000 milliLiter(s) (50 mL/Hr) IV Continuous <Continuous>  dextrose 50% Injectable 25 Gram(s) IV Push once  dextrose 50% Injectable 12.5 Gram(s) IV Push once  dextrose 50% Injectable 25 Gram(s) IV Push once  doxazosin 4 milliGRAM(s) Oral <User Schedule>  folic acid 1 milliGRAM(s) Oral daily  furosemide    Tablet 60 milliGRAM(s) Oral two times a day  glucagon  Injectable 1 milliGRAM(s) IntraMuscular once  hydrALAZINE 100 milliGRAM(s) Oral three times a day  insulin lispro (ADMELOG) corrective regimen sliding scale   SubCutaneous three times a day before meals  insulin lispro (ADMELOG) corrective regimen sliding scale   SubCutaneous at bedtime  iron sucrose Injectable 100 milliGRAM(s) IV Push every 24 hours  isosorbide   mononitrate ER Tablet (IMDUR) 90 milliGRAM(s) Oral daily  lamoTRIgine 100 milliGRAM(s) Oral daily  oxybutynin XL 15 milliGRAM(s) Oral daily  pantoprazole  Injectable 40 milliGRAM(s) IV Push daily  polyethylene glycol 3350 17 Gram(s) Oral daily  senna 2 Tablet(s) Oral at bedtime  venlafaxine XR. 150 milliGRAM(s) Oral daily    MEDICATIONS  (PRN):  acetaminophen     Tablet .. 650 milliGRAM(s) Oral every 6 hours PRN Temp greater or equal to 38C (100.4F), Mild Pain (1 - 3)  aluminum hydroxide/magnesium hydroxide/simethicone Suspension 30 milliLiter(s) Oral every 4 hours PRN Dyspepsia  dextrose Oral Gel 15 Gram(s) Oral once PRN Blood Glucose LESS THAN 70 milliGRAM(s)/deciliter  melatonin 3 milliGRAM(s) Oral at bedtime PRN Insomnia  ondansetron Injectable 4 milliGRAM(s) IV Push every 8 hours PRN Nausea and/or Vomiting    Allergies    No Known Allergies    Intolerances    Vital Signs Last 24 Hrs  T(C): 36.6 (03 Apr 2022 12:13), Max: 37.1 (02 Apr 2022 20:42)  T(F): 97.9 (03 Apr 2022 12:13), Max: 98.8 (02 Apr 2022 20:42)  HR: 64 (03 Apr 2022 12:13) (57 - 78)  BP: 134/69 (03 Apr 2022 12:13) (106/65 - 165/82)  BP(mean): --  RR: 18 (03 Apr 2022 12:13) (17 - 18)  SpO2: 96% (03 Apr 2022 12:13) (92% - 96%)  I&O's Summary      PHYSICAL EXAM:  TELE: Not on tele  Constitutional: NAD, awake and alert, obese  HEENT: Moist Mucous Membranes, Anicteric  Pulmonary: Non-labored, breath sounds are clear bilaterally, No wheezing, rales or rhonchi  Cardiovascular: IRRR, S1 and S2, +murmurs, no rubs, gallops or clicks  Gastrointestinal: Bowel Sounds present, soft, nontender.   Lymph: No peripheral edema. No lymphadenopathy.  Skin: No visible rashes or ulcers.   Psych:  Mood & affect appropriate    LABS: All Labs Reviewed:                        8.7    5.83  )-----------( 199      ( 03 Apr 2022 07:59 )             25.8                         8.6    x     )-----------( x        ( 02 Apr 2022 15:57 )             25.3                         7.6    5.29  )-----------( 157      ( 02 Apr 2022 08:58 )             22.5     03 Apr 2022 07:59    146    |  111    |  24     ----------------------------<  107    3.4     |  27     |  1.90   02 Apr 2022 08:58    144    |  111    |  26     ----------------------------<  174    3.2     |  23     |  1.90   01 Apr 2022 08:02    144    |  108    |  38     ----------------------------<  117    3.3     |  24     |  2.10     Ca    9.2        03 Apr 2022 07:59  Ca    8.9        02 Apr 2022 08:58  Ca    8.9        01 Apr 2022 08:02  Phos  3.8       03 Apr 2022 07:59  Phos  3.4       02 Apr 2022 08:58  Phos  3.6       01 Apr 2022 08:02  Mg     2.3       03 Apr 2022 07:59  Mg     2.0       02 Apr 2022 08:58  Mg     2.3       01 Apr 2022 08:02    TPro  5.6    /  Alb  3.0    /  TBili  0.2    /  DBili  x      /  AST  17     /  ALT  16     /  AlkPhos  52     03 Apr 2022 07:59  TPro  5.2    /  Alb  2.9    /  TBili  0.3    /  DBili  x      /  AST  11     /  ALT  13     /  AlkPhos  49     01 Apr 2022 08:02     EXAM:  ECHO TTE WO CON COMP W DOPP         PROCEDURE DATE:  01/06/2022        INTERPRETATION:  INDICATION: Dyspnea  Sonographer PH    Blood Pressure 177/86    Height 172.7 cm     Weight 104.4 kg    Dimensions:  LA 4.2       Normal Values: 2.0 - 4.0 cm  Ao 3.7        Normal Values: 2.0 - 3.8 cm  SEPTUM 1.4       Normal Values: 0.6 - 1.2 cm  PWT 1.4       Normal Values: 0.6 - 1.1 cm  LVIDd 5.2         Normal Values: 3.0 - 5.6 cm  LVIDs 4.2         Normal Values: 1.8 - 4.0 cm    OBSERVATIONS:  Technically difficult study  Mitral Valve: Mitral annular calcification with thickened leaflets, mild   MR.  Aortic Valve/Aorta: Calcified trileaflet aortic valve with decreased   opening. Peak transaortic valve gradient is 29.4 mmHg with a mean   transaortic valve gradient 14.7 mmHg. This consistent with mild aortic   stenosis.  Tricuspid Valve: Mild TR.  Pulmonic Valve: Not well-visualized  Left Atrium: Enlarged  Right Atrium: Not well-visualized  Left Ventricle: Moderate left ventricular hypertrophy with normal   systolic function, estimated LVEF of 55%.  Right Ventricle: Grossly normal size and systolic function.  Pericardium: no significant pericardial effusion.  Pulmonary/RV Pressure: estimated PA systolic pressure of 32mmHg    IMPRESSION:  Technically difficult study  Moderate left ventricular hypertrophy with normal systolic function,   estimated LVEF of 55%.  Grossly normal RV size and systolic function.  Calcified trileaflet aortic valve with mild aortic stenosis  Mild MR and TR.  No significant pericardial effusion.    --- End of Report ---      VIET GREENWOOD MD; Attending Cardiologist  This document has been electronically signed. Jan 7 2022 11:19AM      ACC: [id number] EXAM:  CT ABDOMEN AND PELVIS                        ACC: [id number] EXAM:  CT CHEST                          PROCEDURE DATE:  03/30/2022          INTERPRETATION:  CLINICAL INFORMATION: Rectal bleed and shortness of   breath    COMPARISON: CT chest 10/18/2021. CT chest abdomen pelvis 10/7/2021. Right   upper quadrant ultrasound 8/20/2021.    CONTRAST/COMPLICATIONS:  IV Contrast: NONE  Oral Contrast: NONE  Complications: None reported at time of study completion    PROCEDURE:  CT of the Chest, Abdomen and Pelvis was performed.  Sagittal and coronal reformats were performed.    FINDINGS:    CHEST:  LUNGS AND LARGE AIRWAYS: Central airways are patent. No focal   consolidation. Dependent scarring and minimal groundglass. Few scattered   2 mm pulmonary nodules. For reference, right upper lobe 2 mm nodule image   29 series 4, right lower lobe 2 mm nodule image 88 series 4, left upper   lobe 2 mm nodule image 26 series 4.  PLEURA: No pleural effusion or pneumothorax.  VESSELS: Atheromatous changes of the thoracic aorta. Main pulmonary   artery size is mildly enlarged, 3.2 cm.  HEART: Qualitatively, heart size is prominent. Aortic root/valve   calcification and coronary artery calcification. Trace pericardial fluid.  MEDIASTINUM AND SANDIP: No enlarged lymph nodes of the thorax. Small fluid   of the right infrahilar region near the right inferior pulmonary vein is   unchanged on multiple prior studies dating back to July 2021, possibly a   pericardial cyst or recess. Esophagus is underdistended.  CHEST WALL AND LOWER NECK: No chest wall hematoma.    ABDOMEN AND PELVIS:    Solid organ evaluation limited due to noncontrast technique.    LIVER: Borderline enlarged liver measuring 18 cm in craniocaudal   dimension.  BILE DUCTS:No biliary distention  GALLBLADDER: Unremarkable CT appearance  SPLEEN: Normal size  PANCREAS: No main ductal dilatation  ADRENALS: Left adrenal gland thickening, similar to prior  KIDNEYS/URETERS: No hydronephrosis. Renal cysts, suboptimally   characterized due to lack of contrast.    BLADDER: Minimally distended.  REPRODUCTIVE ORGANS: Few coarse calcifications of the prostate    BOWEL: Stomach is underdistended. No small bowel distention. Normal   appendix. Mild stool burden of the colon limitsevaluation of the colonic   mucosa. Colonic diverticulosis, without evidence of acute diverticulitis.  PERITONEUM: No ascites.  VESSELS: No aneurysm of the abdominal aorta. Aortoiliac atheromatous   change.  RETROPERITONEUM/LYMPH NODES: Small volume nodes.  ABDOMINAL WALL: Postprocedural change at the umbilicus. New left   iliopsoas muscle calcification, image 158 series 2 since 10/6/2021.   Question prior injury / myositis ossificans.  BONES: Degenerative changes. Old left lateral rib fractures.    IMPRESSION:    Noncontrast study.    CHEST:  1. No large consolidation. Dependent scarring and minimal groundglass.  2. Few subcentimeter 2 mm nodules can be followed with dedicated chest CT   in 12 months based on patient risk factors.  3. Coronary artery calcification. Aortic root/valve calcification.    ABDOMEN/PELVIS:  1. Colonic diverticulosis, without diverticulitis. Given the history of   rectal bleeding, correlate with hemodynamics and hematocrit to guide   further management.    --- End of Report ---    JAZ OCONNOR M.D., ATTENDING RADIOLOGIST  This document has been electronically signed. Mar 30 2022 12:50PM    Ventricular Rate 60 BPM    Atrial Rate 60 BPM    P-R Interval 268 ms    QRS Duration 114 ms    Q-T Interval 456 ms    QTC Calculation(Bazett) 456 ms    R Axis -11 degrees    T Axis 214 degrees    Diagnosis Line Sinus rhythm with 1st degree AV block  Incomplete right bundle branch block  Minimal voltage criteria for LVH, may be normal variant ( Kain product )  ST & T wave abnormality, consider lateral ischemia  Abnormal ECG  When compared with ECG of 05-FEB-2022 10:34,  Inverted T waves have replaced nonspecific T wave abnormality in Lateral leads  Confirmed by Bonifacio BARRERA, Victorino ([id number]) on 3/31/2022 10:45:41 AM

## 2022-04-03 NOTE — PROGRESS NOTE ADULT - SUBJECTIVE AND OBJECTIVE BOX
INTERVAL HPI/OVERNIGHT EVENTS:  no bleeding  MEDICATIONS  (STANDING):  amLODIPine   Tablet 10 milliGRAM(s) Oral daily  atorvastatin 10 milliGRAM(s) Oral at bedtime  budesonide 160 MICROgram(s)/formoterol 4.5 MICROgram(s) Inhaler 2 Puff(s) Inhalation two times a day  carvedilol 6.25 milliGRAM(s) Oral every 12 hours  cloNIDine 0.2 milliGRAM(s) Oral three times a day  cyanocobalamin 1000 MICROGram(s) Oral daily  dextrose 5%. 1000 milliLiter(s) (50 mL/Hr) IV Continuous <Continuous>  dextrose 5%. 1000 milliLiter(s) (100 mL/Hr) IV Continuous <Continuous>  dextrose 50% Injectable 25 Gram(s) IV Push once  dextrose 50% Injectable 12.5 Gram(s) IV Push once  dextrose 50% Injectable 25 Gram(s) IV Push once  doxazosin 4 milliGRAM(s) Oral <User Schedule>  folic acid 1 milliGRAM(s) Oral daily  furosemide    Tablet 60 milliGRAM(s) Oral two times a day  glucagon  Injectable 1 milliGRAM(s) IntraMuscular once  hydrALAZINE 100 milliGRAM(s) Oral three times a day  insulin lispro (ADMELOG) corrective regimen sliding scale   SubCutaneous three times a day before meals  insulin lispro (ADMELOG) corrective regimen sliding scale   SubCutaneous at bedtime  iron sucrose Injectable 100 milliGRAM(s) IV Push every 24 hours  isosorbide   mononitrate ER Tablet (IMDUR) 90 milliGRAM(s) Oral daily  lamoTRIgine 100 milliGRAM(s) Oral daily  oxybutynin XL 15 milliGRAM(s) Oral daily  pantoprazole  Injectable 40 milliGRAM(s) IV Push daily  polyethylene glycol 3350 17 Gram(s) Oral daily  senna 2 Tablet(s) Oral at bedtime  venlafaxine XR. 150 milliGRAM(s) Oral daily    MEDICATIONS  (PRN):  acetaminophen     Tablet .. 650 milliGRAM(s) Oral every 6 hours PRN Temp greater or equal to 38C (100.4F), Mild Pain (1 - 3)  aluminum hydroxide/magnesium hydroxide/simethicone Suspension 30 milliLiter(s) Oral every 4 hours PRN Dyspepsia  dextrose Oral Gel 15 Gram(s) Oral once PRN Blood Glucose LESS THAN 70 milliGRAM(s)/deciliter  melatonin 3 milliGRAM(s) Oral at bedtime PRN Insomnia  ondansetron Injectable 4 milliGRAM(s) IV Push every 8 hours PRN Nausea and/or Vomiting      Allergies    No Known Allergies    Intolerances        Review of Systems:    General:  No wt loss, fevers, chills, night sweats,fatigue,   Eyes:  Good vision, no reported pain  ENT:  No sore throat, pain, runny nose, dysphagia  CV:  No pain, palpitatioins, hypo/hypertension  Resp:  No dyspnea, cough, tachypnea, wheezing  GI:  No pain, No nausea, No vomiting, No diarrhea, No constipatiion, No weight loss, No fever, No pruritis, No rectal bleeding, No tarry stools, No dysphagia,  :  No pain, bleeding, incontinence, nocturia  Muscle:  No pain, weakness  Neuro:  No weakness, tingling, memory problems  Psych:  No fatigue, insomnia, mood problems, depression  Endocrine:  No polyuria, polydypsia, cold/heat intolerance  Heme:  No petechiae, ecchymosis, easy bruisability  Skin:  No rash, tattoos, scars, edema      Vital Signs Last 24 Hrs  T(C): 36.9 (03 Apr 2022 20:04), Max: 37 (03 Apr 2022 11:20)  T(F): 98.4 (03 Apr 2022 20:04), Max: 98.6 (03 Apr 2022 11:20)  HR: 65 (03 Apr 2022 20:04) (57 - 72)  BP: 145/76 (03 Apr 2022 20:04) (133/73 - 165/82)  BP(mean): --  RR: 18 (03 Apr 2022 20:04) (18 - 18)  SpO2: 94% (03 Apr 2022 20:04) (92% - 96%)    PHYSICAL EXAM:    Constitutional: NAD, well-developed  HEENT: EOMI, throat clear  Neck: No LAD, supple  Respiratory: CTA and P  Cardiovascular: S1 and S2, RRR, no M  Gastrointestinal: BS+, soft, NT/ND, neg HSM,  Extremities: No peripheral edema, neg clubing, cyanosis  Vascular: 2+ peripheral pulses  Neurological: A/O x 3, no focal deficits  Psychiatric: Normal mood, normal affect  Skin: No rashes      LABS:                        8.7    5.83  )-----------( 199      ( 03 Apr 2022 07:59 )             25.8     04-03    146<H>  |  111<H>  |  24<H>  ----------------------------<  107<H>  3.4<L>   |  27  |  1.90<H>    Ca    9.2      03 Apr 2022 07:59  Phos  3.8     04-03  Mg     2.3     04-03    TPro  5.6<L>  /  Alb  3.0<L>  /  TBili  0.2  /  DBili  x   /  AST  17  /  ALT  16  /  AlkPhos  52  04-03          RADIOLOGY & ADDITIONAL TESTS:

## 2022-04-03 NOTE — PROGRESS NOTE ADULT - SUBJECTIVE AND OBJECTIVE BOX
Interval History:  denies bleeding  wants to eat  Chart reviewed and events noted;   Overnight events:    MEDICATIONS  (STANDING):  amLODIPine   Tablet 10 milliGRAM(s) Oral daily  atorvastatin 10 milliGRAM(s) Oral at bedtime  budesonide 160 MICROgram(s)/formoterol 4.5 MICROgram(s) Inhaler 2 Puff(s) Inhalation two times a day  carvedilol 6.25 milliGRAM(s) Oral every 12 hours  cloNIDine 0.2 milliGRAM(s) Oral three times a day  cyanocobalamin 1000 MICROGram(s) Oral daily  dextrose 5%. 1000 milliLiter(s) (50 mL/Hr) IV Continuous <Continuous>  dextrose 5%. 1000 milliLiter(s) (100 mL/Hr) IV Continuous <Continuous>  dextrose 50% Injectable 25 Gram(s) IV Push once  dextrose 50% Injectable 12.5 Gram(s) IV Push once  dextrose 50% Injectable 25 Gram(s) IV Push once  doxazosin 4 milliGRAM(s) Oral <User Schedule>  folic acid 1 milliGRAM(s) Oral daily  furosemide    Tablet 60 milliGRAM(s) Oral two times a day  glucagon  Injectable 1 milliGRAM(s) IntraMuscular once  hydrALAZINE 100 milliGRAM(s) Oral three times a day  insulin lispro (ADMELOG) corrective regimen sliding scale   SubCutaneous three times a day before meals  insulin lispro (ADMELOG) corrective regimen sliding scale   SubCutaneous at bedtime  iron sucrose Injectable 100 milliGRAM(s) IV Push every 24 hours  isosorbide   mononitrate ER Tablet (IMDUR) 90 milliGRAM(s) Oral daily  lamoTRIgine 100 milliGRAM(s) Oral daily  oxybutynin XL 15 milliGRAM(s) Oral daily  pantoprazole  Injectable 40 milliGRAM(s) IV Push daily  polyethylene glycol 3350 17 Gram(s) Oral daily  senna 2 Tablet(s) Oral at bedtime  venlafaxine XR. 150 milliGRAM(s) Oral daily    MEDICATIONS  (PRN):  acetaminophen     Tablet .. 650 milliGRAM(s) Oral every 6 hours PRN Temp greater or equal to 38C (100.4F), Mild Pain (1 - 3)  aluminum hydroxide/magnesium hydroxide/simethicone Suspension 30 milliLiter(s) Oral every 4 hours PRN Dyspepsia  dextrose Oral Gel 15 Gram(s) Oral once PRN Blood Glucose LESS THAN 70 milliGRAM(s)/deciliter  melatonin 3 milliGRAM(s) Oral at bedtime PRN Insomnia  ondansetron Injectable 4 milliGRAM(s) IV Push every 8 hours PRN Nausea and/or Vomiting      Vital Signs Last 24 Hrs  T(C): 36.9 (03 Apr 2022 20:04), Max: 37 (03 Apr 2022 11:20)  T(F): 98.4 (03 Apr 2022 20:04), Max: 98.6 (03 Apr 2022 11:20)  HR: 65 (03 Apr 2022 20:04) (57 - 72)  BP: 145/76 (03 Apr 2022 20:04) (133/73 - 165/82)  BP(mean): --  RR: 18 (03 Apr 2022 20:04) (18 - 18)  SpO2: 94% (03 Apr 2022 20:04) (92% - 96%)    PHYSICAL EXAM  General: adult in NAD  HEENT: clear oropharynx, anicteric sclera, pink conjunctivae  Neck: supple  CV: normal S1S2 with no murmur rubs or gallops  Lungs: clear to auscultation, no wheezes, no rhales  Abdomen: soft non-tender non-distended, no hepato/splenomegaly  Ext: no clubbing cyanosis or edema  Skin: no rashes and no petichiae  Neuro: alert and oriented X3 no focal deficits      LABS:  CBC Full  -  ( 03 Apr 2022 07:59 )  WBC Count : 5.83 K/uL  RBC Count : 2.97 M/uL  Hemoglobin : 8.7 g/dL  Hematocrit : 25.8 %  Platelet Count - Automated : 199 K/uL  Mean Cell Volume : 86.9 fl  Mean Cell Hemoglobin : 29.3 pg  Mean Cell Hemoglobin Concentration : 33.7 gm/dL  Auto Neutrophil # : 3.81 K/uL  Auto Lymphocyte # : 1.28 K/uL  Auto Monocyte # : 0.48 K/uL  Auto Eosinophil # : 0.21 K/uL  Auto Basophil # : 0.02 K/uL  Auto Neutrophil % : 65.4 %  Auto Lymphocyte % : 22.0 %  Auto Monocyte % : 8.2 %  Auto Eosinophil % : 3.6 %  Auto Basophil % : 0.3 %    04-03    146<H>  |  111<H>  |  24<H>  ----------------------------<  107<H>  3.4<L>   |  27  |  1.90<H>    Ca    9.2      03 Apr 2022 07:59  Phos  3.8     04-03  Mg     2.3     04-03    TPro  5.6<L>  /  Alb  3.0<L>  /  TBili  0.2  /  DBili  x   /  AST  17  /  ALT  16  /  AlkPhos  52  04-03        fe studies  Iron - Total Binding Capacity.: 230 ug/dL (03-31 @ 11:49)  Ferritin, Serum: 40 ng/mL (03-31 @ 11:49)      WBC trend  5.83 K/uL (04-03-22 @ 07:59)  5.29 K/uL (04-02-22 @ 08:58)  4.62 K/uL (04-01-22 @ 08:02)      Hgb trend  8.7 g/dL (04-03-22 @ 07:59)  8.6 g/dL (04-02-22 @ 15:57)  7.6 g/dL (04-02-22 @ 08:58)  8.2 g/dL (04-01-22 @ 15:57)  7.9 g/dL (04-01-22 @ 13:56)  7.6 g/dL (04-01-22 @ 08:02)      plt trend  199 K/uL (04-03-22 @ 07:59)  157 K/uL (04-02-22 @ 08:58)  161 K/uL (04-01-22 @ 08:02)        RADIOLOGY & ADDITIONAL STUDIES:

## 2022-04-03 NOTE — PROGRESS NOTE ADULT - PROBLEM SELECTOR PLAN 2
Likely Ac Blood loss anemia in the setting of GIB- AC on chronic anemia - additional 1 u pRBC today  - H/H 9.2 on admission, baseline hemoglobin ~10   - s/p 4u pRBC so far in hospital course  - Hb stable today @ 8.6. No further episodes rectal bleeding.  - Transfuse for hemoglobin <8 considering CAD history and current GIB  - maintain active type and screen  - if pt has further episodes of rectal bleeding/becomes hemodynamically unstable, will order NM GI bleed localization study and possibly consult surgery and/or IR for possible intervention  - B12, folate wnl; iron panel w low serum iron   - folate, vitamin B12, TSH wnl   - F/u AM CBC  - Heme-onc consult Jennifer: replete fe stores with IV fe

## 2022-04-04 ENCOUNTER — INPATIENT (INPATIENT)
Facility: HOSPITAL | Age: 77
LOS: 6 days | Discharge: NURSING, REHAB OR LONG-TERM CARE | DRG: 378 | End: 2022-04-11
Attending: INTERNAL MEDICINE | Admitting: INTERNAL MEDICINE
Payer: MEDICARE

## 2022-04-04 VITALS
HEIGHT: 68 IN | DIASTOLIC BLOOD PRESSURE: 101 MMHG | HEART RATE: 67 BPM | OXYGEN SATURATION: 94 % | WEIGHT: 220.9 LBS | TEMPERATURE: 98 F | RESPIRATION RATE: 18 BRPM | SYSTOLIC BLOOD PRESSURE: 177 MMHG

## 2022-04-04 VITALS
HEART RATE: 62 BPM | OXYGEN SATURATION: 93 % | TEMPERATURE: 98 F | SYSTOLIC BLOOD PRESSURE: 193 MMHG | DIASTOLIC BLOOD PRESSURE: 88 MMHG | RESPIRATION RATE: 18 BRPM

## 2022-04-04 DIAGNOSIS — Z98.89 OTHER SPECIFIED POSTPROCEDURAL STATES: Chronic | ICD-10-CM

## 2022-04-04 DIAGNOSIS — L05.91 PILONIDAL CYST WITHOUT ABSCESS: Chronic | ICD-10-CM

## 2022-04-04 DIAGNOSIS — I66.9 OCCLUSION AND STENOSIS OF UNSPECIFIED CEREBRAL ARTERY: Chronic | ICD-10-CM

## 2022-04-04 DIAGNOSIS — N44.00 TORSION OF TESTIS, UNSPECIFIED: Chronic | ICD-10-CM

## 2022-04-04 DIAGNOSIS — Z98.49 CATARACT EXTRACTION STATUS, UNSPECIFIED EYE: Chronic | ICD-10-CM

## 2022-04-04 LAB
ALBUMIN SERPL ELPH-MCNC: 2.8 G/DL — LOW (ref 3.3–5)
ALP SERPL-CCNC: 52 U/L — SIGNIFICANT CHANGE UP (ref 40–120)
ALT FLD-CCNC: 16 U/L — SIGNIFICANT CHANGE UP (ref 12–78)
ANION GAP SERPL CALC-SCNC: 6 MMOL/L — SIGNIFICANT CHANGE UP (ref 5–17)
APTT BLD: 30.2 SEC — SIGNIFICANT CHANGE UP (ref 27.5–35.5)
AST SERPL-CCNC: 13 U/L — LOW (ref 15–37)
BASOPHILS # BLD AUTO: 0.03 K/UL — SIGNIFICANT CHANGE UP (ref 0–0.2)
BASOPHILS NFR BLD AUTO: 0.4 % — SIGNIFICANT CHANGE UP (ref 0–2)
BILIRUB SERPL-MCNC: 0.2 MG/DL — SIGNIFICANT CHANGE UP (ref 0.2–1.2)
BLD GP AB SCN SERPL QL: NEGATIVE — SIGNIFICANT CHANGE UP
BUN SERPL-MCNC: 23 MG/DL — SIGNIFICANT CHANGE UP (ref 7–23)
CALCIUM SERPL-MCNC: 8.8 MG/DL — SIGNIFICANT CHANGE UP (ref 8.5–10.1)
CHLORIDE SERPL-SCNC: 108 MMOL/L — SIGNIFICANT CHANGE UP (ref 96–108)
CO2 SERPL-SCNC: 28 MMOL/L — SIGNIFICANT CHANGE UP (ref 22–31)
CREAT SERPL-MCNC: 2 MG/DL — HIGH (ref 0.5–1.3)
EGFR: 34 ML/MIN/1.73M2 — LOW
EOSINOPHIL # BLD AUTO: 0.3 K/UL — SIGNIFICANT CHANGE UP (ref 0–0.5)
EOSINOPHIL NFR BLD AUTO: 4 % — SIGNIFICANT CHANGE UP (ref 0–6)
GLUCOSE SERPL-MCNC: 117 MG/DL — HIGH (ref 70–99)
HCT VFR BLD CALC: 24.8 % — LOW (ref 39–50)
HCT VFR BLD CALC: 25.4 % — LOW (ref 39–50)
HCT VFR BLD CALC: 27.9 % — LOW (ref 39–50)
HCT VFR BLD CALC: 28.7 % — LOW (ref 39–50)
HGB BLD-MCNC: 8.1 G/DL — LOW (ref 13–17)
HGB BLD-MCNC: 8.4 G/DL — LOW (ref 13–17)
HGB BLD-MCNC: 9.2 G/DL — LOW (ref 13–17)
HGB BLD-MCNC: 9.4 G/DL — LOW (ref 13–17)
IMM GRANULOCYTES NFR BLD AUTO: 0.4 % — SIGNIFICANT CHANGE UP (ref 0–1.5)
INR BLD: 1.09 RATIO — SIGNIFICANT CHANGE UP (ref 0.88–1.16)
LYMPHOCYTES # BLD AUTO: 1.41 K/UL — SIGNIFICANT CHANGE UP (ref 1–3.3)
LYMPHOCYTES # BLD AUTO: 19 % — SIGNIFICANT CHANGE UP (ref 13–44)
MAGNESIUM SERPL-MCNC: 2.2 MG/DL — SIGNIFICANT CHANGE UP (ref 1.6–2.6)
MCHC RBC-ENTMCNC: 28.7 PG — SIGNIFICANT CHANGE UP (ref 27–34)
MCHC RBC-ENTMCNC: 28.9 PG — SIGNIFICANT CHANGE UP (ref 27–34)
MCHC RBC-ENTMCNC: 29 PG — SIGNIFICANT CHANGE UP (ref 27–34)
MCHC RBC-ENTMCNC: 32.8 GM/DL — SIGNIFICANT CHANGE UP (ref 32–36)
MCHC RBC-ENTMCNC: 33 GM/DL — SIGNIFICANT CHANGE UP (ref 32–36)
MCHC RBC-ENTMCNC: 33.1 GM/DL — SIGNIFICANT CHANGE UP (ref 32–36)
MCV RBC AUTO: 87.5 FL — SIGNIFICANT CHANGE UP (ref 80–100)
MCV RBC AUTO: 87.6 FL — SIGNIFICANT CHANGE UP (ref 80–100)
MCV RBC AUTO: 87.7 FL — SIGNIFICANT CHANGE UP (ref 80–100)
MONOCYTES # BLD AUTO: 0.5 K/UL — SIGNIFICANT CHANGE UP (ref 0–0.9)
MONOCYTES NFR BLD AUTO: 6.7 % — SIGNIFICANT CHANGE UP (ref 2–14)
NEUTROPHILS # BLD AUTO: 5.14 K/UL — SIGNIFICANT CHANGE UP (ref 1.8–7.4)
NEUTROPHILS NFR BLD AUTO: 69.5 % — SIGNIFICANT CHANGE UP (ref 43–77)
NRBC # BLD: 0 /100 WBCS — SIGNIFICANT CHANGE UP (ref 0–0)
PHOSPHATE SERPL-MCNC: 3.8 MG/DL — SIGNIFICANT CHANGE UP (ref 2.5–4.5)
PLATELET # BLD AUTO: 196 K/UL — SIGNIFICANT CHANGE UP (ref 150–400)
PLATELET # BLD AUTO: 199 K/UL — SIGNIFICANT CHANGE UP (ref 150–400)
PLATELET # BLD AUTO: 218 K/UL — SIGNIFICANT CHANGE UP (ref 150–400)
POTASSIUM SERPL-MCNC: 3.8 MMOL/L — SIGNIFICANT CHANGE UP (ref 3.5–5.3)
POTASSIUM SERPL-SCNC: 3.8 MMOL/L — SIGNIFICANT CHANGE UP (ref 3.5–5.3)
PROT SERPL-MCNC: 5.6 G/DL — LOW (ref 6–8.3)
PROTHROM AB SERPL-ACNC: 12.6 SEC — SIGNIFICANT CHANGE UP (ref 10.5–13.4)
RBC # BLD: 2.9 M/UL — LOW (ref 4.2–5.8)
RBC # BLD: 3.18 M/UL — LOW (ref 4.2–5.8)
RBC # BLD: 3.28 M/UL — LOW (ref 4.2–5.8)
RBC # FLD: 16.3 % — HIGH (ref 10.3–14.5)
RBC # FLD: 16.5 % — HIGH (ref 10.3–14.5)
RBC # FLD: 16.8 % — HIGH (ref 10.3–14.5)
RH IG SCN BLD-IMP: NEGATIVE — SIGNIFICANT CHANGE UP
SODIUM SERPL-SCNC: 142 MMOL/L — SIGNIFICANT CHANGE UP (ref 135–145)
WBC # BLD: 6.79 K/UL — SIGNIFICANT CHANGE UP (ref 3.8–10.5)
WBC # BLD: 7.41 K/UL — SIGNIFICANT CHANGE UP (ref 3.8–10.5)
WBC # BLD: 7.54 K/UL — SIGNIFICANT CHANGE UP (ref 3.8–10.5)
WBC # FLD AUTO: 6.79 K/UL — SIGNIFICANT CHANGE UP (ref 3.8–10.5)
WBC # FLD AUTO: 7.41 K/UL — SIGNIFICANT CHANGE UP (ref 3.8–10.5)
WBC # FLD AUTO: 7.54 K/UL — SIGNIFICANT CHANGE UP (ref 3.8–10.5)

## 2022-04-04 PROCEDURE — 85610 PROTHROMBIN TIME: CPT

## 2022-04-04 PROCEDURE — 86850 RBC ANTIBODY SCREEN: CPT

## 2022-04-04 PROCEDURE — 86900 BLOOD TYPING SEROLOGIC ABO: CPT

## 2022-04-04 PROCEDURE — 93010 ELECTROCARDIOGRAM REPORT: CPT

## 2022-04-04 PROCEDURE — 87522 HEPATITIS C REVRS TRNSCRPJ: CPT

## 2022-04-04 PROCEDURE — 80048 BASIC METABOLIC PNL TOTAL CA: CPT

## 2022-04-04 PROCEDURE — 94664 DEMO&/EVAL PT USE INHALER: CPT

## 2022-04-04 PROCEDURE — 74176 CT ABD & PELVIS W/O CONTRAST: CPT | Mod: MA

## 2022-04-04 PROCEDURE — 85025 COMPLETE CBC W/AUTO DIFF WBC: CPT

## 2022-04-04 PROCEDURE — 84443 ASSAY THYROID STIM HORMONE: CPT

## 2022-04-04 PROCEDURE — 84466 ASSAY OF TRANSFERRIN: CPT

## 2022-04-04 PROCEDURE — 82746 ASSAY OF FOLIC ACID SERUM: CPT

## 2022-04-04 PROCEDURE — 78278 ACUTE GI BLOOD LOSS IMAGING: CPT

## 2022-04-04 PROCEDURE — 99285 EMERGENCY DEPT VISIT HI MDM: CPT

## 2022-04-04 PROCEDURE — 99232 SBSQ HOSP IP/OBS MODERATE 35: CPT

## 2022-04-04 PROCEDURE — P9016: CPT

## 2022-04-04 PROCEDURE — 86923 COMPATIBILITY TEST ELECTRIC: CPT

## 2022-04-04 PROCEDURE — 84100 ASSAY OF PHOSPHORUS: CPT

## 2022-04-04 PROCEDURE — 71250 CT THORAX DX C-: CPT | Mod: MA

## 2022-04-04 PROCEDURE — 85018 HEMOGLOBIN: CPT

## 2022-04-04 PROCEDURE — 94640 AIRWAY INHALATION TREATMENT: CPT

## 2022-04-04 PROCEDURE — 96374 THER/PROPH/DIAG INJ IV PUSH: CPT

## 2022-04-04 PROCEDURE — 70450 CT HEAD/BRAIN W/O DYE: CPT | Mod: MA

## 2022-04-04 PROCEDURE — 87521 HEPATITIS C PROBE&RVRS TRNSC: CPT

## 2022-04-04 PROCEDURE — A9560: CPT

## 2022-04-04 PROCEDURE — 78278 ACUTE GI BLOOD LOSS IMAGING: CPT | Mod: 26

## 2022-04-04 PROCEDURE — 82728 ASSAY OF FERRITIN: CPT

## 2022-04-04 PROCEDURE — 86901 BLOOD TYPING SEROLOGIC RH(D): CPT

## 2022-04-04 PROCEDURE — 85014 HEMATOCRIT: CPT

## 2022-04-04 PROCEDURE — 85730 THROMBOPLASTIN TIME PARTIAL: CPT

## 2022-04-04 PROCEDURE — U0003: CPT

## 2022-04-04 PROCEDURE — 83550 IRON BINDING TEST: CPT

## 2022-04-04 PROCEDURE — 86803 HEPATITIS C AB TEST: CPT

## 2022-04-04 PROCEDURE — 82272 OCCULT BLD FECES 1-3 TESTS: CPT

## 2022-04-04 PROCEDURE — 82607 VITAMIN B-12: CPT

## 2022-04-04 PROCEDURE — 81001 URINALYSIS AUTO W/SCOPE: CPT

## 2022-04-04 PROCEDURE — 85027 COMPLETE CBC AUTOMATED: CPT

## 2022-04-04 PROCEDURE — 36415 COLL VENOUS BLD VENIPUNCTURE: CPT

## 2022-04-04 PROCEDURE — 93005 ELECTROCARDIOGRAM TRACING: CPT

## 2022-04-04 PROCEDURE — 99285 EMERGENCY DEPT VISIT HI MDM: CPT | Mod: 25

## 2022-04-04 PROCEDURE — 71045 X-RAY EXAM CHEST 1 VIEW: CPT

## 2022-04-04 PROCEDURE — 83540 ASSAY OF IRON: CPT

## 2022-04-04 PROCEDURE — 82962 GLUCOSE BLOOD TEST: CPT

## 2022-04-04 PROCEDURE — 80053 COMPREHEN METABOLIC PANEL: CPT

## 2022-04-04 PROCEDURE — 36430 TRANSFUSION BLD/BLD COMPNT: CPT

## 2022-04-04 PROCEDURE — 83036 HEMOGLOBIN GLYCOSYLATED A1C: CPT

## 2022-04-04 PROCEDURE — 83735 ASSAY OF MAGNESIUM: CPT

## 2022-04-04 PROCEDURE — U0005: CPT

## 2022-04-04 PROCEDURE — 87635 SARS-COV-2 COVID-19 AMP PRB: CPT

## 2022-04-04 PROCEDURE — 97162 PT EVAL MOD COMPLEX 30 MIN: CPT

## 2022-04-04 PROCEDURE — 99497 ADVNCD CARE PLAN 30 MIN: CPT

## 2022-04-04 RX ORDER — ARIPIPRAZOLE 15 MG/1
1 TABLET ORAL
Qty: 0 | Refills: 0 | DISCHARGE

## 2022-04-04 RX ORDER — POTASSIUM CHLORIDE 20 MEQ
20 PACKET (EA) ORAL ONCE
Refills: 0 | Status: DISCONTINUED | OUTPATIENT
Start: 2022-04-04 | End: 2022-04-04

## 2022-04-04 RX ORDER — INSULIN LISPRO 100/ML
VIAL (ML) SUBCUTANEOUS EVERY 6 HOURS
Refills: 0 | Status: DISCONTINUED | OUTPATIENT
Start: 2022-04-04 | End: 2022-04-04

## 2022-04-04 RX ORDER — ASPIRIN/CALCIUM CARB/MAGNESIUM 324 MG
81 TABLET ORAL DAILY
Refills: 0 | Status: DISCONTINUED | OUTPATIENT
Start: 2022-04-04 | End: 2022-04-04

## 2022-04-04 RX ORDER — DEXTROSE MONOHYDRATE, SODIUM CHLORIDE, AND POTASSIUM CHLORIDE 50; .745; 4.5 G/1000ML; G/1000ML; G/1000ML
1000 INJECTION, SOLUTION INTRAVENOUS
Refills: 0 | Status: DISCONTINUED | OUTPATIENT
Start: 2022-04-04 | End: 2022-04-04

## 2022-04-04 RX ORDER — APIXABAN 2.5 MG/1
1 TABLET, FILM COATED ORAL
Qty: 0 | Refills: 0 | DISCHARGE
Start: 2022-04-04

## 2022-04-04 RX ORDER — PANTOPRAZOLE SODIUM 20 MG/1
80 TABLET, DELAYED RELEASE ORAL ONCE
Refills: 0 | Status: COMPLETED | OUTPATIENT
Start: 2022-04-04 | End: 2022-04-04

## 2022-04-04 RX ORDER — HYDRALAZINE HCL 50 MG
100 TABLET ORAL ONCE
Refills: 0 | Status: COMPLETED | OUTPATIENT
Start: 2022-04-04 | End: 2022-04-04

## 2022-04-04 RX ORDER — CARVEDILOL PHOSPHATE 80 MG/1
1 CAPSULE, EXTENDED RELEASE ORAL
Qty: 0 | Refills: 0 | DISCHARGE

## 2022-04-04 RX ADMIN — ISOSORBIDE MONONITRATE 90 MILLIGRAM(S): 60 TABLET, EXTENDED RELEASE ORAL at 11:19

## 2022-04-04 RX ADMIN — CARVEDILOL PHOSPHATE 6.25 MILLIGRAM(S): 80 CAPSULE, EXTENDED RELEASE ORAL at 05:13

## 2022-04-04 RX ADMIN — Medication 20 MILLIEQUIVALENT(S): at 11:15

## 2022-04-04 RX ADMIN — ATORVASTATIN CALCIUM 10 MILLIGRAM(S): 80 TABLET, FILM COATED ORAL at 21:09

## 2022-04-04 RX ADMIN — Medication 100 MILLIGRAM(S): at 21:09

## 2022-04-04 RX ADMIN — Medication 100 MILLIGRAM(S): at 13:37

## 2022-04-04 RX ADMIN — Medication 0.2 MILLIGRAM(S): at 05:13

## 2022-04-04 RX ADMIN — Medication 4 MILLIGRAM(S): at 08:04

## 2022-04-04 RX ADMIN — Medication 60 MILLIGRAM(S): at 18:41

## 2022-04-04 RX ADMIN — LAMOTRIGINE 100 MILLIGRAM(S): 25 TABLET, ORALLY DISINTEGRATING ORAL at 11:15

## 2022-04-04 RX ADMIN — Medication 650 MILLIGRAM(S): at 08:45

## 2022-04-04 RX ADMIN — Medication 1 MILLIGRAM(S): at 11:16

## 2022-04-04 RX ADMIN — Medication 100 MILLIGRAM(S): at 05:13

## 2022-04-04 RX ADMIN — PANTOPRAZOLE SODIUM 40 MILLIGRAM(S): 20 TABLET, DELAYED RELEASE ORAL at 11:20

## 2022-04-04 RX ADMIN — Medication 4 MILLIGRAM(S): at 21:09

## 2022-04-04 RX ADMIN — Medication 650 MILLIGRAM(S): at 09:45

## 2022-04-04 RX ADMIN — IRON SUCROSE 100 MILLIGRAM(S): 20 INJECTION, SOLUTION INTRAVENOUS at 12:01

## 2022-04-04 RX ADMIN — Medication 15 MILLIGRAM(S): at 11:15

## 2022-04-04 RX ADMIN — Medication 0.2 MILLIGRAM(S): at 21:09

## 2022-04-04 RX ADMIN — Medication 1: at 12:01

## 2022-04-04 RX ADMIN — Medication 0.2 MILLIGRAM(S): at 13:37

## 2022-04-04 RX ADMIN — SENNA PLUS 2 TABLET(S): 8.6 TABLET ORAL at 21:09

## 2022-04-04 RX ADMIN — Medication 81 MILLIGRAM(S): at 12:01

## 2022-04-04 RX ADMIN — Medication 60 MILLIGRAM(S): at 05:12

## 2022-04-04 RX ADMIN — Medication 150 MILLIGRAM(S): at 11:16

## 2022-04-04 RX ADMIN — PANTOPRAZOLE SODIUM 80 MILLIGRAM(S): 20 TABLET, DELAYED RELEASE ORAL at 23:06

## 2022-04-04 RX ADMIN — PREGABALIN 1000 MICROGRAM(S): 225 CAPSULE ORAL at 11:15

## 2022-04-04 RX ADMIN — AMLODIPINE BESYLATE 10 MILLIGRAM(S): 2.5 TABLET ORAL at 05:15

## 2022-04-04 NOTE — CONSULT NOTE ADULT - SUBJECTIVE AND OBJECTIVE BOX
HPI:  76 yo M PMH of A fib (on Eliquis) , MGUS s/p PRBC transfusion 10/21, anxiety/depression, CAD s/p stents (not on plavix), BPH, CHF, Diverticulosis, HLD, HTN, thyroid nodules, CKD stage 3, DM2 presents to the ED from [facility] after having up to 3 bowel movements with dark/black stool. Patient would have another episode of dark stool in the ED. Patient denied consuming any beets or magnesium/calcium supplements recently. Patient states that onset of stool was very sudden. Patient recently admitted to [facility] in January for CHF exacerbation, improved w/ diuresis. Patient was seen and examined at bedside, denied any headache, cp/pressure, palpitations, myalgias, fevers/chills, SOB, hematuria, dysuria. Patient endorses lightheadedness/dizziness, 4/10 non-radiating sharp periumbilical pain, and chronic knee pain.     Interval history: Surgery called for jejunal bleed discovered on NM bleeding scan today.  Patient seen and examined at bedside. Patient states he had been having rectal bleeding, hematochezia, and melena for a month, however came to the ED when bleeding worsened.  Patient states he has been "filling the toilet with blood" when he goes to have a BM.  At this time, he admits to slight dizziness, but denies chest pain, shortness of breath, abdominal pain, nausea or vomiting.  He is s/p 4u PRBC since admission 3/30/22. He underwent colonoscopy with GI on 4/1/22 with findings of old blood noted in the left colon, multiple diverticulum from proximal transverse to sigmoid, no active bleeding, poor prep with large clot burden, prep was not adequate to evaluate small lesions or polyps.    PAST MEDICAL & SURGICAL HISTORY:  HTN (hypertension), c/b multiple episodes of hypertensive urgency  HLD (hyperlipidemia)  Atrial fibrillation, first documented on EKG 10/7/2021, on eliquis  CAD (coronary artery disease), s/p stents (not on plavix)  Type 2 diabetes mellitus, not on home insulin/ Meds  Anxiety  Depression, multiple psych medications  History of diverticulitis, 07/2021  Diverticulosis, c/b GIB in 2020  Afib, on AC  Stage 3 chronic kidney disease  Anemia of chronic disease  Monoclonal Gammopathy-MGUS, pRBC transfusion 10/15/21  Chronic diastolic congestive heart failure  Multiple thyroid nodules  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, Had surgery ( April 2013 )  S/P tonsillectomy  S/P arthroscopic knee surgery, Bilateral ( 2005 )  Torsion of testicle, Had surgery at age 14  Pilonidal cyst, Had surgery ( 1969 )  S/P cataract surgery, Bilateral  H/O hernia repair    REVIEW OF SYSTEMS:  CONSTITUTIONAL: No weakness, fevers or chills  EYES/ENT: No visual changes;  No vertigo or throat pain   NECK: No pain or stiffness  RESPIRATORY: No cough, wheezing, hemoptysis; No shortness of breath  CARDIOVASCULAR: No chest pain or palpitations  GASTROINTESTINAL: +Rectal bleeding. No abdominal or epigastric pain. No nausea, vomiting, or hematemesis; No diarrhea or constipation.  GENITOURINARY: No dysuria, frequency or hematuria  NEUROLOGICAL: No numbness or weakness  SKIN: No itching, burning, rashes, or lesions   All other review of systems is negative unless indicated above.    MEDICATIONS:  MEDICATIONS  (STANDING):  amLODIPine   Tablet 10 milliGRAM(s) Oral daily  atorvastatin 10 milliGRAM(s) Oral at bedtime  budesonide 160 MICROgram(s)/formoterol 4.5 MICROgram(s) Inhaler 2 Puff(s) Inhalation two times a day  carvedilol 6.25 milliGRAM(s) Oral every 12 hours  cloNIDine 0.2 milliGRAM(s) Oral three times a day  cyanocobalamin 1000 MICROGram(s) Oral daily  dextrose 5% + sodium chloride 0.45% with potassium chloride 20 mEq/L 1000 milliLiter(s) (50 mL/Hr) IV Continuous <Continuous>  dextrose 5%. 1000 milliLiter(s) (100 mL/Hr) IV Continuous <Continuous>  dextrose 5%. 1000 milliLiter(s) (50 mL/Hr) IV Continuous <Continuous>  dextrose 50% Injectable 25 Gram(s) IV Push once  dextrose 50% Injectable 12.5 Gram(s) IV Push once  dextrose 50% Injectable 25 Gram(s) IV Push once  doxazosin 4 milliGRAM(s) Oral <User Schedule>  folic acid 1 milliGRAM(s) Oral daily  furosemide    Tablet 60 milliGRAM(s) Oral two times a day  glucagon  Injectable 1 milliGRAM(s) IntraMuscular once  hydrALAZINE 100 milliGRAM(s) Oral three times a day  insulin lispro (ADMELOG) corrective regimen sliding scale   SubCutaneous every 6 hours  isosorbide   mononitrate ER Tablet (IMDUR) 90 milliGRAM(s) Oral daily  lamoTRIgine 100 milliGRAM(s) Oral daily  oxybutynin XL 15 milliGRAM(s) Oral daily  pantoprazole  Injectable 40 milliGRAM(s) IV Push daily  polyethylene glycol 3350 17 Gram(s) Oral daily  potassium chloride    Tablet ER 20 milliEquivalent(s) Oral daily  senna 2 Tablet(s) Oral at bedtime  venlafaxine XR. 150 milliGRAM(s) Oral daily    MEDICATIONS  (PRN):  acetaminophen     Tablet .. 650 milliGRAM(s) Oral every 6 hours PRN Temp greater or equal to 38C (100.4F), Mild Pain (1 - 3)  aluminum hydroxide/magnesium hydroxide/simethicone Suspension 30 milliLiter(s) Oral every 4 hours PRN Dyspepsia  dextrose Oral Gel 15 Gram(s) Oral once PRN Blood Glucose LESS THAN 70 milliGRAM(s)/deciliter  melatonin 3 milliGRAM(s) Oral at bedtime PRN Insomnia  ondansetron Injectable 4 milliGRAM(s) IV Push every 8 hours PRN Nausea and/or Vomiting    ALLERGIES:  No Known Allergies    SOCIAL HISTORY:  Lives in [facility]   ADL independent   Ambulates w/ a walker   Former smoker, quit 45 years ago, ~8 pack years   Alcohol denies   Drugs denies   COVID Moderna x2 (30 Mar 2022 15:40)    FAMILY HISTORY:  FHx: throat cancer  No family history of colorectal cancer    VITAL SIGNS:  Vital Signs Last 24 Hrs  T(C): 36.5 (04 Apr 2022 18:14), Max: 36.9 (03 Apr 2022 20:04)  T(F): 97.7 (04 Apr 2022 18:14), Max: 98.4 (03 Apr 2022 20:04)  HR: 57 (04 Apr 2022 18:14) (56 - 72)  BP: 172/83 (04 Apr 2022 18:14) (117/66 - 174/85)  RR: 18 (04 Apr 2022 18:14) (17 - 18)  SpO2: 95% (04 Apr 2022 18:14) (92% - 95%)    PHYSICAL EXAM:  GENERAL:  Well-nourished, well-developed male lying comfortably in bed in NAD.  HEENT:  Sclera white. Mucous membranes moist.  CARDIO:  Regular rate and rhythm.  No murmur, gallop or rub appreciated.  RESPIRATORY:  Clear to auscultation bilaterally.  No wheezing, rales or rhonchi appreciated.  ABDOMEN:  Soft, nondistended, mild Right-sided tenderness to deep palpation;  No rebound tenderness or guarding.  SKIN:  +Pallor. No jaundice or cyanosis.  NEURO:  Alert    LABS:                     8.1    x     )-----------( x        ( 04 Apr 2022 14:06 )             24.8     04-04    142  |  108  |  23  ----------------------------<  117<H>  3.8   |  28  |  2.00<H>    Ca    8.8      04 Apr 2022 08:27  Phos  3.8     04-04  Mg     2.2     04-04    TPro  5.6<L>  /  Alb  2.8<L>  /  TBili  0.2  /  DBili  x   /  AST  13<L>  /  ALT  16  /  AlkPhos  52  04-04    LIVER FUNCTIONS - ( 04 Apr 2022 08:27 )  Alb: 2.8 g/dL / Pro: 5.6 g/dL / ALK PHOS: 52 U/L / ALT: 16 U/L / AST: 13 U/L / GGT: x           RADIOLOGY & ADDITIONAL STUDIES:  < from: NM GI Bleed Localization (NM GI Bleed Localization .) (04.04.22 @ 18:07) >  FINDINGS: Immediately after the administration of the labeled RBC, there   is extravasation of activity in the left abdominal region which moves in   a serpiginous pattern compatible with small bowel bleed, probably jejunum.    IMPRESSION: Abnormal GI bleeding scan.  I is compatible with small bowel bleed, probably jejunal.

## 2022-04-04 NOTE — CHART NOTE - NSCHARTNOTEFT_GEN_A_CORE
Called by Dr Munroe Attending NM physician with results of GI bleeding scan. Results indicating likely jejunal small bowel bleed. Patient is s/p 4 pRBC throughout hospital course for GIB. Will give 1u pRBC as patient being prepared for urgent transfer to [place] for possible capsule endoscope with push enteroscopy. Discussed with Dr Jeremiah Robertson, Dr Chavez and Dr Bah regarding NM results and transfer to [place]. Patient informed of results, consent was obtained. Called by Dr Munroe Attending NM physician with results of GI bleeding scan. Results indicating likely jejunal small bowel bleed. Patient is s/p 4 pRBC throughout hospital course for GIB. Will give 1u pRBC as patient being prepared for urgent transfer to [place] for possible capsule endoscope with push enteroscopy or possible IR capsule embolization. Surgery PA made aware and saw patient, recommending transfer for possible aforementioned procedures. Discussed with Dr Chavez and Dr Crockett regarding NM results and transfer to [facility]. Patient informed of results, consent was obtained for transfer.     If patient not transferred by end of 1u pRBC transfusion, will consider transfusing 2nd unit pRBC with f/u H/H as time permits.    Above was discussed with Attending physician Dr Jeremiah Robertson

## 2022-04-04 NOTE — PROGRESS NOTE ADULT - PROBLEM SELECTOR PLAN 3
Chronic, now in sinus rhythm, rate controlled  - TTE from January 2022 showed no mitral valve involvement   - Holding home Eliquis in context of GI/rectal bleeding   - Monitor lytes and replete as needed  - Cardiology ([facility]) consulted, f/u recs

## 2022-04-04 NOTE — PROGRESS NOTE ADULT - PROBLEM SELECTOR PLAN 5
Chronic, stable   - Patient does not appear volume overloaded on physical exam   - continue Lasix 60mg BID w/ holding parameters for chronic leg swelling   - continue home Imdur and home carvedilol w/ holding parameters  - murmur - consistent with known aortic stenosis
Chronic, stable   - Patient does not appear volume overloaded on physical exam   - continue Lasix 60mg BID w/ holding parameters for chronic leg swelling   - continue home Imdur and home carvedilol w/ holding parameters
Chronic, stable   - Patient does not appear volume overloaded on physical exam   - continue Lasix 60mg BID w/ holding parameters for chronic leg swelling   - continue home Imdur and home carvedilol w/ holding parameters  - murmur - consistent with known aortic stenosis
Chronic, stable   - Patient does not appear volume overloaded on physical exam   - continue Lasix 60mg BID w/ holding parameters for chronic leg swelling   - continue home Imdur and home carvedilol w/ holding parameters
Chronic, stable   - Patient does not appear volume overloaded on physical exam   - continue Lasix 60mg BID w/ holding parameters for chronic leg swelling   - continue home Imdur and home carvedilol w/ holding parameters  - murmur - consistent with known aortic stenosis  - f/u outpatient with cardio Dr. Sheridan to re-check labs in 3-5 days and add K supplementation as necessary

## 2022-04-04 NOTE — PROGRESS NOTE ADULT - PROBLEM SELECTOR PROBLEM 11
Abnormal finding on lung imaging

## 2022-04-04 NOTE — PROGRESS NOTE ADULT - PROBLEM SELECTOR PLAN 6
Chronic stable   - continue home venlafaxine 150mg qD

## 2022-04-04 NOTE — ED PROVIDER NOTE - PROGRESS NOTE DETAILS
Attending Allan: spoke w/ Dr. Leonardo who reported that Dr. Bhagat from IR is expecting pt. Spoke w/ IR res/fellow who will speak w/ Dr. Bhagat and get back w/ further recs. Attending Karey:  pt signed out to me. Discussed w/ IR who states he spoke w/ Leola and states pt is not for IR tonight and that was not the plan, pt is for capsule endoscopy tomorrow. This was discussed with the hospitalist by previous attending who is aware and agreed to accept admission.

## 2022-04-04 NOTE — PROGRESS NOTE ADULT - NS ATTEND AMEND GEN_ALL_CORE FT
Continue to hold AC.  Continue to trend H/H and transfuse as needed
Continues with acute blood loss anemia.  Getting PRBC.  Trend H/H and transfuse as needed.  Optimized for GI eval once transfusion complete.
Doing better volume wise, though reports sig brbpr this afternoon  hold ac/antiplatelets and repeat hb
Patient with persistent blood stools and acute blood loss anemia.  Transfuse to hgb greater than 8.  Getting PRBC now.  Trend H/H.  To follow.  At risk for abrupt decompensation.
Chart reviewed    Patient seen and examined    Agree with plan as outlined    77 year old male with past medical history of A fib (on Eliquis), incomplete RBBB CAD s/p stents ( CHF, HLD, HTN, CKD stage 3, DM2 GIB s/p PRBC transfusion 10/21, anxiety/depression,  recently admitted  for acute hypoxic respiratory failure 2/2 covid-19 pneumonia 1/3-1/18 then readmitted 1/23 with acute on chronic diastolic chf and readmitted 2/22 for chest pain now presenting with rectal bleeding. Reports two day history of rectal bleeding.    CAD ChF HTN HLD   Check 12 lead ekg  occult positive, seen by GI   transfuse to keep hct > 26 given cad- awaiting PRBC this am   continue home coreg, statin, clonidine, norvasc hydralazine and isosorbide, bp stable 124/72  TTE 01/6/ 2022 EF 55%, moderate LVH, mild MR, mild AS, mild TR - no need to repeat   holding Eliquis plan for acute blood loss anemia  plan for colonoscopy on Friday , no cardiac objection to proceeding with low risk procedure   ARIELLA on CKD,4- monitor cr closely follow up renal consult , on po lasix   Monitor and replete electrolytes. Keep K>4.0 and Mg>2.0.

## 2022-04-04 NOTE — GOALS OF CARE CONVERSATION - ADVANCED CARE PLANNING - CONVERSATION DETAILS
Writer met with pt  Reviewed patient's medical and social history as well as events leading to patient's hospitalization. Writer discussed patient's current diagnosis anemia, CHF, CAD, afib, anxiety/depression medical condition and management,  prognosis, and life expectancy. Pt states he hcp naming his sister Jennifer as agent. Recommended he discuss with her  his wishes regarding extent of medical care to be provided including escalation of medical care into the ICU intubation  and use of vasopressor support. In addition his  thoughts regarding cardiopulmonary resuscitation, artificial nutrition and hydration including use of feeding tubes and IVF, antibiotics, and further investigative studies such as blood draws and radiology .Pt showed good. insight into medical condition and states he will discuss with health care agent

## 2022-04-04 NOTE — PROGRESS NOTE ADULT - SUBJECTIVE AND OBJECTIVE BOX
[facility] -- Fifi Bliss, Kalpesh Valdovinos, Abraham Sheridan, Kev Monterroso: Office # [phone]    Follow Up:  Afib on Eliquis, GIB, VHD     Subjective/Observations: Patient seen and examined, awake, alert, resting comfortably in bed, denies chest pain, dyspnea, palpitations or dizziness. Tolerating room air. c/o headache, RN made aware    REVIEW OF SYSTEMS: All review of systems is negative for eye, ENT, GI, , allergic, dermatologic, musculoskeletal and neurologic except as described above    PAST MEDICAL & SURGICAL HISTORY:  HTN (hypertension)  c/b multiple episodes of hypertensive urgency    HLD (hyperlipidemia)    Atrial fibrillation  first documented on EKG 10/7/2021    CAD (coronary artery disease)  s/p stents (not on plavix)    Type 2 diabetes mellitus  not on home insulin/ Meds    Anxiety  Depression  multiple psych medications    History of diverticulitis  07/2021    Diverticulosis  c/b GIB in 2020    Afib  on AC    Stage 3 chronic kidney disease    Anemia of chronic disease  Monoclonal Gammopathy-MGUS  pRBC transfusion 10/15/21    Chronic diastolic congestive heart failure    Multiple thyroid nodules    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  Had surgery ( April 2013 )    S/P tonsillectomy    S/P arthroscopic knee surgery  Bilateral ( 2005 )    Torsion of testicle  Had surgery at age 14    Pilonidal cyst  Had surgery ( 1969 )    S/P cataract surgery  Bilateral    H/O hernia repair        MEDICATIONS  (STANDING):  amLODIPine   Tablet 10 milliGRAM(s) Oral daily  atorvastatin 10 milliGRAM(s) Oral at bedtime  budesonide 160 MICROgram(s)/formoterol 4.5 MICROgram(s) Inhaler 2 Puff(s) Inhalation two times a day  carvedilol 6.25 milliGRAM(s) Oral every 12 hours  cloNIDine 0.2 milliGRAM(s) Oral three times a day  cyanocobalamin 1000 MICROGram(s) Oral daily  dextrose 5%. 1000 milliLiter(s) (50 mL/Hr) IV Continuous <Continuous>  dextrose 5%. 1000 milliLiter(s) (100 mL/Hr) IV Continuous <Continuous>  dextrose 50% Injectable 25 Gram(s) IV Push once  dextrose 50% Injectable 12.5 Gram(s) IV Push once  dextrose 50% Injectable 25 Gram(s) IV Push once  doxazosin 4 milliGRAM(s) Oral <User Schedule>  folic acid 1 milliGRAM(s) Oral daily  furosemide    Tablet 60 milliGRAM(s) Oral two times a day  glucagon  Injectable 1 milliGRAM(s) IntraMuscular once  hydrALAZINE 100 milliGRAM(s) Oral three times a day  insulin lispro (ADMELOG) corrective regimen sliding scale   SubCutaneous three times a day before meals  insulin lispro (ADMELOG) corrective regimen sliding scale   SubCutaneous at bedtime  iron sucrose Injectable 100 milliGRAM(s) IV Push every 24 hours  isosorbide   mononitrate ER Tablet (IMDUR) 90 milliGRAM(s) Oral daily  lamoTRIgine 100 milliGRAM(s) Oral daily  oxybutynin XL 15 milliGRAM(s) Oral daily  pantoprazole  Injectable 40 milliGRAM(s) IV Push daily  polyethylene glycol 3350 17 Gram(s) Oral daily  potassium chloride    Tablet ER 20 milliEquivalent(s) Oral daily  senna 2 Tablet(s) Oral at bedtime  venlafaxine XR. 150 milliGRAM(s) Oral daily    MEDICATIONS  (PRN):  acetaminophen     Tablet .. 650 milliGRAM(s) Oral every 6 hours PRN Temp greater or equal to 38C (100.4F), Mild Pain (1 - 3)  aluminum hydroxide/magnesium hydroxide/simethicone Suspension 30 milliLiter(s) Oral every 4 hours PRN Dyspepsia  dextrose Oral Gel 15 Gram(s) Oral once PRN Blood Glucose LESS THAN 70 milliGRAM(s)/deciliter  melatonin 3 milliGRAM(s) Oral at bedtime PRN Insomnia  ondansetron Injectable 4 milliGRAM(s) IV Push every 8 hours PRN Nausea and/or Vomiting    Allergies    No Known Allergies    Intolerances      Vital Signs Last 24 Hrs  T(C): 36.8 (04 Apr 2022 04:17), Max: 36.9 (03 Apr 2022 20:04)  T(F): 98.3 (04 Apr 2022 04:17), Max: 98.4 (03 Apr 2022 20:04)  HR: 60 (04 Apr 2022 11:18) (56 - 72)  BP: 145/76 (04 Apr 2022 11:18) (134/69 - 174/85)  BP(mean): --  RR: 18 (04 Apr 2022 04:17) (18 - 18)  SpO2: 95% (04 Apr 2022 04:17) (94% - 96%)  I&O's Summary        TELE: Not on telemetry   PHYSICAL EXAM:  Appearance: NAD, no distress, alert, well developled   HEENT: Moist Mucous Membranes, Anicteric  Cardiovascular: Regular rate and rhythm, Normal S1 S2, No JVD, No murmurs, No rubs, gallops or clicks  Respiratory: Non-labored, Clear to auscultation, No rales, No rhonchi, No wheezing.   Gastrointestinal:  Soft, Non-tender, + BS  Neurologic: Non-focal  Skin: Warm and dry, No visible rashes or ulcers, No ecchymosis, No cyanosis  Musculoskeletal: No clubbing, No cyanosis, No joint swelling/tenderness  Psychiatry: Mood & affect appropriate  Lymph: No peripheral edema.     LABS: All Labs Reviewed:                        8.4    6.79  )-----------( 196      ( 04 Apr 2022 08:27 )             25.4                         8.7    5.83  )-----------( 199      ( 03 Apr 2022 07:59 )             25.8                         8.6    x     )-----------( x        ( 02 Apr 2022 15:57 )             25.3     04 Apr 2022 08:27    142    |  108    |  23     ----------------------------<  117    3.8     |  28     |  2.00   03 Apr 2022 07:59    146    |  111    |  24     ----------------------------<  107    3.4     |  27     |  1.90   02 Apr 2022 08:58    144    |  111    |  26     ----------------------------<  174    3.2     |  23     |  1.90     Ca    8.8        04 Apr 2022 08:27  Ca    9.2        03 Apr 2022 07:59  Ca    8.9        02 Apr 2022 08:58  Phos  3.8       04 Apr 2022 08:27  Phos  3.8       03 Apr 2022 07:59  Phos  3.4       02 Apr 2022 08:58  Mg     2.2       04 Apr 2022 08:27  Mg     2.3       03 Apr 2022 07:59  Mg     2.0       02 Apr 2022 08:58    TPro  5.6    /  Alb  2.8    /  TBili  0.2    /  DBili  x      /  AST  13     /  ALT  16     /  AlkPhos  52     04 Apr 2022 08:27  TPro  5.6    /  Alb  3.0    /  TBili  0.2    /  DBili  x      /  AST  17     /  ALT  16     /  AlkPhos  52     03 Apr 2022 07:59                    12 Lead ECG:   Ventricular Rate 60 BPM    Atrial Rate 60 BPM    P-R Interval 268 ms    QRS Duration 114 ms    Q-T Interval 456 ms    QTC Calculation(Bazett) 456 ms    R Axis -11 degrees    T Axis 214 degrees    Diagnosis Line Sinus rhythm with 1st degree AV block  Incomplete right bundle branch block  Minimal voltage criteria for LVH, may be normal variant ( Kain product )  ST & T wave abnormality, consider lateral ischemia  Abnormal ECG  When compared with ECG of 05-FEB-2022 10:34,  Inverted T waves have replaced nonspecific T wave abnormality in Lateral leads  Confirmed by Bonifacio BARRERA, Victorino ([id number]) on 3/31/2022 10:45:41 AM (03-30-22 @ 13:01)    < from: Xray Chest 1 View- PORTABLE-Urgent (Xray Chest 1 View- PORTABLE-Urgent .) (03.30.22 @ 11:34) >    ACC: [id number] EXAM:  XR CHEST PORTABLE URGENT 1V                          PROCEDURE DATE:  03/30/2022          INTERPRETATION:  Clinical history: 77-year-old male, black stool.    Single view of the chest is correlated with a concurrent chest CT.    FINDINGS: Normal cardiac silhouette and normal pulmonary vasculature with   no consolidation, effusion, pneumothorax or acute osseous finding.    Mild bilateral lower lobe atelectasis/scarring.    IMPRESSION:  No acute radiographic findings, CT performed    --- End of Report ---            DIVYA VILLAGRAN DO; Attending Radiologist  This document has been electronically signed. Mar 30 2022  3:24PM    < end of copied text >  < from: TTE Echo Complete w/o Contrast w/ Doppler (01.06.22 @ 10:52) >   EXAM:  ECHO TTE WO CON COMP W DOPP         PROCEDURE DATE:  01/06/2022        INTERPRETATION:  INDICATION: Dyspnea  Sonographer PH    Blood Pressure 177/86    Height 172.7 cm     Weight 104.4 kg    Dimensions:  LA 4.2       Normal Values: 2.0 - 4.0 cm  Ao 3.7        Normal Values: 2.0 - 3.8 cm  SEPTUM 1.4       Normal Values: 0.6 - 1.2 cm  PWT 1.4       Normal Values: 0.6 - 1.1 cm  LVIDd 5.2         Normal Values: 3.0 - 5.6 cm  LVIDs 4.2         Normal Values: 1.8 - 4.0 cm      OBSERVATIONS:  Technically difficult study  Mitral Valve: Mitral annular calcification with thickened leaflets, mild   MR.  Aortic Valve/Aorta: Calcified trileaflet aortic valve with decreased   opening. Peak transaortic valve gradient is 29.4 mmHg with a mean   transaortic valve gradient 14.7 mmHg. This consistent with mild aortic   stenosis.  Tricuspid Valve: Mild TR.  Pulmonic Valve: Not well-visualized  Left Atrium: Enlarged  Right Atrium: Not well-visualized  Left Ventricle: Moderate left ventricular hypertrophy with normal   systolic function, estimated LVEF of 55%.  Right Ventricle: Grossly normal size and systolic function.  Pericardium: no significant pericardial effusion.  Pulmonary/RV Pressure: estimated PA systolic pressure of 32mmHg        IMPRESSION:  Technically difficult study  Moderate left ventricular hypertrophy with normal systolic function,   estimated LVEF of 55%.  Grossly normal RV size and systolic function.  Calcified trileaflet aortic valve with mild aortic stenosis  Mild MR andTR.  No significant pericardial effusion.    --- End of Report ---              VIET GREENWOOD MD; Attending Cardiologist  This document has been electronically signed. Jan 7 2022 11:19AM    < end of copied text >

## 2022-04-04 NOTE — ED PROVIDER NOTE - CLINICAL SUMMARY MEDICAL DECISION MAKING FREE TEXT BOX
Aquiles, PGY3: tsfr for ugib with positive nuc rbc scan, prev on eliquis, restarte asa this week. Received 1 unit prbc today. Vitals reassuring, no sxs active bleed at this time, no abd pain currently. IR is aware. Private GI is aware. Will give protonix ivp, check cbc, do type, obtain consent, speak with Dr. Leonardo who accepted pt. TBA Aquiles, PGY3: tsfr for ugib with positive nuc rbc scan, prev on eliquis, restarte asa this week. Received 1 unit prbc today. Vitals reassuring, no sxs active bleed at this time, no abd pain currently. IR is aware. Private GI is aware. Will give protonix ivp, check cbc, do type, obtain consent, speak with Dr. Leonardo who accepted pt. TBA    Attending Allan: 76 y/o M w/ PMH of A fib (eliquis), MGUS, CAD s/p stents (ASA), anxiety, depression, CHF, diverticulosis, CKD, DM presenting w/ GI bleed. Seen in green, BIBEMS. Transfer from [facility]. Pt admitted since last week after having bloody/black stools. AC was held at admission. Colonoscopy did nto reveal obvious srouce of bleeding so was restarted on ASA. Today pt reports having bowel movement that was entirely bloody. Pt reports during his hospitalization receiving "about 7" units of blood, currently receiving one unit. Denies any CP, dizziness, shortness of breath. Pt had nuc med GI bleed scan who showed bleeding from small bowel. Pt was transferred to [facility] for further management. Pt denying complaints at this time. Currently denying active rectal bleeding or having BMs. Pt pale appearing, in no acute distress. Lungs clear. HR regular. Abd nondistended/soft/nontender. Non focal neuro exam. Pt w/ GI bleed on nuc med scan. I received transfer call regarding this pt earlier. GI Dr. Crocektt aware of pt coming to [facility]. Dr. Leonardo the accepting hospitalist aware as well. Unclear if IR is aware of pt. Will speak w/ Dr. Leonardo to discuss case further. Pt hemodynamically stable at this time. Will finish currently infusing unit or PRBCs, obtain new blood consent for [facility], check CBC, EKG, protonix push to cover any UGIB component. Will admit. Will reassess the need for additional interventions as clinically warranted.

## 2022-04-04 NOTE — CONSULT NOTE ADULT - ASSESSMENT
77 year old male with past medical history of A fib (on Eliquis), incomplete RBBB CAD s/p stents ( CHF, HLD, HTN, CKD stage 3, DM2 GIB s/p PRBC transfusion 10/21, anxiety/depression,  recently admitted  for acute hypoxic respiratory failure 2/2 covid-19 pneumonia 1/3-1/18 then readmitted 1/23 with acute on chronic diastolic chf and readmitted 2/22 for chest pain now presenting with rectal bleeding. Reports two day history of rectal bleeding.      Check 12 lead ekg  occult positive, seen by GI   transfuse to keep hct > 26 given cad  chronic LE trace edema on lasix PO-   if transfusing can give split units with lasix in between   continue home coreg, statin, clonidine, norvasc hydralazine and isosorbide, bp stable 133/81 at this time- if multiple episodes of rectal bleeding noted hold norvasc   TTE 01/6/ 2022 EF 55%, moderate LVH, mild MR, mild AS, mild TR - no need to repeat   holding Eliquis plan for acute blood loss anemia  plan for colonoscopy on Friday , no cardiac objection to proceeding with low risk procedure   ARIELLA on CKD,4- monitor cr closely consider renal consult     Monitor and replete electrolytes. Keep K>4.0 and Mg>2.0.  Carmelina Leger FNP-C  Cardiology NP  [id number] [phone]    
rectal bleed  acute blood loss anemia  hepatitis c    admit to medicine  hold a/c  clear liquid diet  colonoscopy friday  may need to consider Vit K given elevated inr and rectal bleeding  cbc bid  check hep c pcr  d/w patient    I reviewed the overnight course of events on the unit, re-confirming the patient history. I discussed the care with the patient and their family  The plan of care was discussed with the physician assistant and modifications were made to the notation where appropriate.   Differential diagnosis and plan of care discussed with patient after the evaluation  35 minutes spent on total encounter of which more than fifty percent of the encounter was spent counseling and/or coordinating care by the attending physician.  Advanced care planning was discussed with patient and family.  Advanced care planning forms were reviewed and discussed.  Risks, benefits and alternatives of gastroenterologic procedures were discussed in detail and all questions were answered.  
Multifactorial anemia related to acute GI bleed and renal insufficiency.  has received 2 units of PRBC without complication.  fe studies c/w functional iron deficiency with a decrease in fe saturation and borderline ferritin    Recommendations:  1.  follow CBC and transfuse as indicated  2.  scheduled for colonoscopy tomorrow  3.  will replete fe stores with IV fe   4.  continue to hold eliquis  5.  cardiology evaluation for evaluation of need of anticoagulation  6.  further heme recommendations pending above  
77 year old male with PMH of A fib (on Eliquis) , MGUS s/p PRBC transfusion 10/21, anxiety/depression, CAD s/p stents (not on plavix), BPH, CHF, Diverticulosis, HLD, HTN, thyroid nodules, CKD stage 3, DM2, admitted with a GI bleed, s/p 4u PRBC, s/p NM bleeding scan today with findings compatible with a jejunal bleed.
CKD 4  Anemia, GI bleed  Diabetes  h/o CHF  Hypertension  h/o SLE  Hypokalemia    Renal indices stable at baseline. PRBC transfusion. Monitor post transfusion h/h. GI evaluation. To continue current meds. Retacrit for anemia.   Monitor blood sugar levels. Insulin coverage as needed. Potassium supplementation. Monitor BP trend. Titrate BP meds as needed. Salt restriction.    Will follow electrolytes and renal function trend. Avoid nephrotoxic meds as possible.   Further recommendations pending clinical course. Thank you for the courtesy of this referral.

## 2022-04-04 NOTE — PROGRESS NOTE ADULT - SUBJECTIVE AND OBJECTIVE BOX
Patient seen and examined;  Chart reviewed and events noted;   feels overall well; reports no stools    MEDICATIONS  (STANDING):  amLODIPine   Tablet 10 milliGRAM(s) Oral daily  atorvastatin 10 milliGRAM(s) Oral at bedtime  budesonide 160 MICROgram(s)/formoterol 4.5 MICROgram(s) Inhaler 2 Puff(s) Inhalation two times a day  carvedilol 6.25 milliGRAM(s) Oral every 12 hours  cloNIDine 0.2 milliGRAM(s) Oral three times a day  cyanocobalamin 1000 MICROGram(s) Oral daily  doxazosin 4 milliGRAM(s) Oral <User Schedule>  folic acid 1 milliGRAM(s) Oral daily  furosemide    Tablet 60 milliGRAM(s) Oral two times a day  glucagon  Injectable 1 milliGRAM(s) IntraMuscular once  hydrALAZINE 100 milliGRAM(s) Oral three times a day  insulin lispro (ADMELOG) corrective regimen sliding scale   SubCutaneous three times a day before meals  insulin lispro (ADMELOG) corrective regimen sliding scale   SubCutaneous at bedtime  iron sucrose Injectable 100 milliGRAM(s) IV Push every 24 hours  isosorbide   mononitrate ER Tablet (IMDUR) 90 milliGRAM(s) Oral daily  lamoTRIgine 100 milliGRAM(s) Oral daily  oxybutynin XL 15 milliGRAM(s) Oral daily  pantoprazole  Injectable 40 milliGRAM(s) IV Push daily  polyethylene glycol 3350 17 Gram(s) Oral daily  potassium chloride    Tablet ER 20 milliEquivalent(s) Oral daily  senna 2 Tablet(s) Oral at bedtime  venlafaxine XR. 150 milliGRAM(s) Oral daily    MEDICATIONS  (PRN):  acetaminophen     Tablet .. 650 milliGRAM(s) Oral every 6 hours PRN Temp greater or equal to 38C (100.4F), Mild Pain (1 - 3)  aluminum hydroxide/magnesium hydroxide/simethicone Suspension 30 milliLiter(s) Oral every 4 hours PRN Dyspepsia  dextrose Oral Gel 15 Gram(s) Oral once PRN Blood Glucose LESS THAN 70 milliGRAM(s)/deciliter  melatonin 3 milliGRAM(s) Oral at bedtime PRN Insomnia  ondansetron Injectable 4 milliGRAM(s) IV Push every 8 hours PRN Nausea and/or Vomiting      Vital Signs Last 24 Hrs  T(C): 36.8 (04 Apr 2022 04:17), Max: 37 (03 Apr 2022 11:20)  T(F): 98.3 (04 Apr 2022 04:17), Max: 98.6 (03 Apr 2022 11:20)  HR: 56 (04 Apr 2022 08:00) (56 - 72)  BP: 144/82 (04 Apr 2022 08:00) (133/73 - 174/85)  RR: 18 (04 Apr 2022 04:17) (18 - 18)  SpO2: 95% (04 Apr 2022 04:17) (94% - 96%)    PHYSICAL EXAM  General: adult in NAD  HEENT: clear oropharynx, anicteric sclera, pink conjunctivae  Neck: supple  CV: normal S1S2 with no murmur rubs or gallops  Lungs: clear to auscultation, no wheezes, no rhales  Abdomen: soft non-tender non-distended, no hepato/splenomegaly  Ext: no clubbing cyanosis or edema  Skin: no rashes and no petichiae  Neuro: alert and oriented X3 no focal deficits      LABS:                        8.4    6.79  )-----------( 196      ( 04 Apr 2022 08:27 )             25.4     Hemoglobin: 8.4 g/dL (04-04 @ 08:27)  Hemoglobin: 8.7 g/dL (04-03 @ 07:59)  Hemoglobin: 8.6 g/dL (04-02 @ 15:57)  Hemoglobin: 7.6 g/dL (04-02 @ 08:58)  Hemoglobin: 8.2 g/dL (04-01 @ 15:57)      04-04    142  |  108  |  23  ----------------------------<  117<H>  3.8   |  28  |  2.00<H>    Ca    8.8      04 Apr 2022 08:27  Phos  3.8     04-04  Mg     2.2     04-04    TPro  5.6<L>  /  Alb  2.8<L>  /  TBili  0.2  /  DBili  x   /  AST  13<L>  /  ALT  16  /  AlkPhos  52  04-04

## 2022-04-04 NOTE — PROGRESS NOTE ADULT - PROVIDER SPECIALTY LIST ADULT
Gastroenterology
Gastroenterology
Heme/Onc
Heme/Onc
Gastroenterology
Nephrology
Nephrology
Cardiology
Gastroenterology
Gastroenterology
Nephrology
Cardiology
Cardiology
Heme/Onc
Heme/Onc
Internal Medicine

## 2022-04-04 NOTE — PROGRESS NOTE ADULT - SUBJECTIVE AND OBJECTIVE BOX
Patient is a 77y old  Male who presents with a chief complaint of Rectal bleed (01 Apr 2022 13:32)    Patient seen in follow up for CKD.        PAST MEDICAL HISTORY:  Hypertension    Diabetes mellitus    Lupus    Hepatitis C    Anxiety and depression    CAD (coronary artery disease)    Diverticulosis    Hyperlipidemia    HTN (hypertension)    HLD (hyperlipidemia)    Atrial fibrillation    CAD (coronary artery disease)    Type 2 diabetes mellitus    Anxiety    History of diverticulitis    Diverticulosis    Afib    Stage 3 chronic kidney disease    Anemia of chronic disease    Chronic diastolic congestive heart failure    Multiple thyroid nodules      MEDICATIONS  (STANDING):  amLODIPine   Tablet 10 milliGRAM(s) Oral daily  aspirin  chewable 81 milliGRAM(s) Oral daily  atorvastatin 10 milliGRAM(s) Oral at bedtime  budesonide 160 MICROgram(s)/formoterol 4.5 MICROgram(s) Inhaler 2 Puff(s) Inhalation two times a day  carvedilol 6.25 milliGRAM(s) Oral every 12 hours  cloNIDine 0.2 milliGRAM(s) Oral three times a day  cyanocobalamin 1000 MICROGram(s) Oral daily  dextrose 5%. 1000 milliLiter(s) (100 mL/Hr) IV Continuous <Continuous>  dextrose 5%. 1000 milliLiter(s) (50 mL/Hr) IV Continuous <Continuous>  dextrose 50% Injectable 25 Gram(s) IV Push once  dextrose 50% Injectable 12.5 Gram(s) IV Push once  dextrose 50% Injectable 25 Gram(s) IV Push once  doxazosin 4 milliGRAM(s) Oral <User Schedule>  folic acid 1 milliGRAM(s) Oral daily  furosemide    Tablet 60 milliGRAM(s) Oral two times a day  glucagon  Injectable 1 milliGRAM(s) IntraMuscular once  hydrALAZINE 100 milliGRAM(s) Oral three times a day  insulin lispro (ADMELOG) corrective regimen sliding scale   SubCutaneous three times a day before meals  insulin lispro (ADMELOG) corrective regimen sliding scale   SubCutaneous at bedtime  isosorbide   mononitrate ER Tablet (IMDUR) 90 milliGRAM(s) Oral daily  lamoTRIgine 100 milliGRAM(s) Oral daily  oxybutynin XL 15 milliGRAM(s) Oral daily  pantoprazole  Injectable 40 milliGRAM(s) IV Push daily  polyethylene glycol 3350 17 Gram(s) Oral daily  potassium chloride    Tablet ER 20 milliEquivalent(s) Oral daily  senna 2 Tablet(s) Oral at bedtime  venlafaxine XR. 150 milliGRAM(s) Oral daily    MEDICATIONS  (PRN):  acetaminophen     Tablet .. 650 milliGRAM(s) Oral every 6 hours PRN Temp greater or equal to 38C (100.4F), Mild Pain (1 - 3)  aluminum hydroxide/magnesium hydroxide/simethicone Suspension 30 milliLiter(s) Oral every 4 hours PRN Dyspepsia  dextrose Oral Gel 15 Gram(s) Oral once PRN Blood Glucose LESS THAN 70 milliGRAM(s)/deciliter  melatonin 3 milliGRAM(s) Oral at bedtime PRN Insomnia  ondansetron Injectable 4 milliGRAM(s) IV Push every 8 hours PRN Nausea and/or Vomiting    T(C): 36.8 (04-04-22 @ 04:17), Max: 37.1 (04-02-22 @ 20:42)  HR: 60 (04-04-22 @ 11:18) (56 - 78)  BP: 145/76 (04-04-22 @ 11:18) (106/65 - 174/85)  RR: 18 (04-04-22 @ 04:17)  SpO2: 95% (04-04-22 @ 04:17)  Wt(kg): --  I&O's Detail          PHYSICAL EXAM:  General: No distress  Respiratory: b/l air entry  Cardiovascular: S1 S2  Gastrointestinal: soft  Extremities:  edema                         LABORATORY:                        8.4    6.79  )-----------( 196      ( 04 Apr 2022 08:27 )             25.4     04-04    142  |  108  |  23  ----------------------------<  117<H>  3.8   |  28  |  2.00<H>    Ca    8.8      04 Apr 2022 08:27  Phos  3.8     04-04  Mg     2.2     04-04    TPro  5.6<L>  /  Alb  2.8<L>  /  TBili  0.2  /  DBili  x   /  AST  13<L>  /  ALT  16  /  AlkPhos  52  04-04    Sodium, Serum: 142 mmol/L (04-04 @ 08:27)  Sodium, Serum: 146 mmol/L (04-03 @ 07:59)    Potassium, Serum: 3.8 mmol/L (04-04 @ 08:27)  Potassium, Serum: 3.4 mmol/L (04-03 @ 07:59)    Hemoglobin: 8.4 g/dL (04-04 @ 08:27)  Hemoglobin: 8.7 g/dL (04-03 @ 07:59)  Hemoglobin: 8.6 g/dL (04-02 @ 15:57)  Hemoglobin: 7.6 g/dL (04-02 @ 08:58)    Creatinine, Serum 2.00 (04-04 @ 08:27)  Creatinine, Serum 1.90 (04-03 @ 07:59)  Creatinine, Serum 1.90 (04-02 @ 08:58)        LIVER FUNCTIONS - ( 04 Apr 2022 08:27 )  Alb: 2.8 g/dL / Pro: 5.6 g/dL / ALK PHOS: 52 U/L / ALT: 16 U/L / AST: 13 U/L / GGT: x

## 2022-04-04 NOTE — PROGRESS NOTE ADULT - SUBJECTIVE AND OBJECTIVE BOX
[facility]  Radu Harrington PA-C  [facility] [place] [address]  [phone]      INTERVAL HPI/OVERNIGHT EVENTS:  Pt s/e  Pt reports he feels well  Denies hematochezia, melena, BRBPR, coffee ground emesis, hematemesis or further GI complaints    MEDICATIONS  (STANDING):  amLODIPine   Tablet 10 milliGRAM(s) Oral daily  atorvastatin 10 milliGRAM(s) Oral at bedtime  budesonide 160 MICROgram(s)/formoterol 4.5 MICROgram(s) Inhaler 2 Puff(s) Inhalation two times a day  carvedilol 6.25 milliGRAM(s) Oral every 12 hours  cloNIDine 0.2 milliGRAM(s) Oral three times a day  cyanocobalamin 1000 MICROGram(s) Oral daily  dextrose 5%. 1000 milliLiter(s) (50 mL/Hr) IV Continuous <Continuous>  dextrose 5%. 1000 milliLiter(s) (100 mL/Hr) IV Continuous <Continuous>  dextrose 50% Injectable 25 Gram(s) IV Push once  dextrose 50% Injectable 12.5 Gram(s) IV Push once  dextrose 50% Injectable 25 Gram(s) IV Push once  doxazosin 4 milliGRAM(s) Oral <User Schedule>  folic acid 1 milliGRAM(s) Oral daily  furosemide    Tablet 60 milliGRAM(s) Oral two times a day  glucagon  Injectable 1 milliGRAM(s) IntraMuscular once  hydrALAZINE 100 milliGRAM(s) Oral three times a day  insulin lispro (ADMELOG) corrective regimen sliding scale   SubCutaneous three times a day before meals  insulin lispro (ADMELOG) corrective regimen sliding scale   SubCutaneous at bedtime  iron sucrose Injectable 100 milliGRAM(s) IV Push every 24 hours  isosorbide   mononitrate ER Tablet (IMDUR) 90 milliGRAM(s) Oral daily  lamoTRIgine 100 milliGRAM(s) Oral daily  oxybutynin XL 15 milliGRAM(s) Oral daily  pantoprazole  Injectable 40 milliGRAM(s) IV Push daily  polyethylene glycol 3350 17 Gram(s) Oral daily  potassium chloride    Tablet ER 20 milliEquivalent(s) Oral daily  senna 2 Tablet(s) Oral at bedtime  venlafaxine XR. 150 milliGRAM(s) Oral daily    MEDICATIONS  (PRN):  acetaminophen     Tablet .. 650 milliGRAM(s) Oral every 6 hours PRN Temp greater or equal to 38C (100.4F), Mild Pain (1 - 3)  aluminum hydroxide/magnesium hydroxide/simethicone Suspension 30 milliLiter(s) Oral every 4 hours PRN Dyspepsia  dextrose Oral Gel 15 Gram(s) Oral once PRN Blood Glucose LESS THAN 70 milliGRAM(s)/deciliter  melatonin 3 milliGRAM(s) Oral at bedtime PRN Insomnia  ondansetron Injectable 4 milliGRAM(s) IV Push every 8 hours PRN Nausea and/or Vomiting      Allergies  No Known Allergies    PHYSICAL EXAM:   Vital Signs:  Vital Signs Last 24 Hrs  T(C): 36.8 (04 Apr 2022 04:17), Max: 37 (03 Apr 2022 11:20)  T(F): 98.3 (04 Apr 2022 04:17), Max: 98.6 (03 Apr 2022 11:20)  HR: 56 (04 Apr 2022 08:00) (56 - 72)  BP: 144/82 (04 Apr 2022 08:00) (133/73 - 174/85)  BP(mean): --  RR: 18 (04 Apr 2022 04:17) (18 - 18)  SpO2: 95% (04 Apr 2022 04:17) (94% - 96%)  Daily     Daily     GENERAL:  Appears stated age  ABDOMEN:  Soft, non-tender, non-distended  NEURO:  Alert    LABS:                        8.4    6.79  )-----------( 196      ( 04 Apr 2022 08:27 )             25.4     04-04    142  |  108  |  23  ----------------------------<  117<H>  3.8   |  28  |  2.00<H>    Ca    8.8      04 Apr 2022 08:27  Phos  3.8     04-04  Mg     2.2     04-04    TPro  5.6<L>  /  Alb  2.8<L>  /  TBili  0.2  /  DBili  x   /  AST  13<L>  /  ALT  16  /  AlkPhos  52  04-04

## 2022-04-04 NOTE — PROGRESS NOTE ADULT - PROBLEM SELECTOR PLAN 2
Likely Ac Blood loss anemia in the setting of GIB- AC on chronic anemia - additional 1 u pRBC today  - H/H 9.2 on admission, baseline hemoglobin ~10   - s/p 4u pRBC so far in hospital course  - Hb stable today @ 8.4. No further episodes rectal bleeding.  - Transfuse for hemoglobin <8 considering CAD history and current GIB  - maintain active type and screen  - if pt has further episodes of rectal bleeding/becomes hemodynamically unstable, will order NM GI bleed localization study and possibly consult surgery and/or IR for possible intervention  - B12, folate wnl; iron panel w low serum iron   - folate, vitamin B12, TSH wnl   - F/u AM CBC  - Heme-onc consult Jennifer: replete fe stores with IV fe Likely Ac Blood loss anemia in the setting of GIB- AC on chronic anemia - additional 1 u pRBC Now   - H/H 9.2 on admission, baseline hemoglobin ~10   - s/p 4u pRBC so far in hospital course given   - Hb stable today @ 8.4. +  episodes of  BRBPR today   - Transfuse for hemoglobin <8 considering CAD history and current GIB  - maintain active type and screen  - if pt has further episodes of rectal bleeding  - NM GI bleeding scan is positive for active bleeding -plan for Transfer for Possible  Capsule endoscopy with push enteroscopy OR  IR embolisation intervention  - B12, folate wnl; iron panel w low serum iron   - folate, vitamin B12, TSH wnl   - F/u AM CBC  - Heme-onc consult Jennifer: replete fe stores with IV fe

## 2022-04-04 NOTE — ED PROVIDER NOTE - PHYSICAL EXAMINATION
GENERAL: no acute distress, non-toxic appearing  HEENT: normal conjunctiva, oral mucosa moist  CARDIAC: regular rate and regular rhythm, sbp 160s  PULM: clear to ascultation bilaterally, no appreciable crackles, rales, rhonchi, or wheezing, sats well on RA, no increased work of breathing  GI: abdomen nondistended, soft, nontender  : no suprapubic tenderness  NEURO: alert and oriented x 3, normal speech, moving all extremities without lateralization  MSK: no visible deformities, no peripheral edema  SKIN: pale skin, no jaundice/rashes  PSYCH: appropriate mood and affect

## 2022-04-04 NOTE — PROGRESS NOTE ADULT - PROBLEM SELECTOR PLAN 10
Chronic  - Continue home atorvastatin 10mg qHS

## 2022-04-04 NOTE — PROGRESS NOTE ADULT - ASSESSMENT
77 year old male with past medical history of A fib (on Eliquis), incomplete RBBB CAD s/p stents ( CHF, HLD, HTN, CKD stage 3, DM2 GIB s/p PRBC transfusion 10/21, anxiety/depression,  recently admitted  for acute hypoxic respiratory failure 2/2 covid-19 pneumonia 1/3-1/18 then readmitted 1/23 with acute on chronic diastolic chf and readmitted 2/22 for chest pain now presenting with rectal bleeding. Reports two day history of rectal bleeding.    CAD ChF HTN HLD   Check 12 lead ekg  occult positive, seen by GI   transfuse to keep hct > 26 given cad- awaiting PRBC this am   continue home coreg, statin, clonidine, norvasc hydralazine and isosorbide, bp stable 124/72  TTE 01/6/ 2022 EF 55%, moderate LVH, mild MR, mild AS, mild TR - no need to repeat   holding Eliquis plan for acute blood loss anemia  plan for colonoscopy on Friday , no cardiac objection to proceeding with low risk procedure   ARIELLA on CKD,4- monitor cr closely follow up renal consult , on po lasix   Monitor and replete electrolytes. Keep K>4.0 and Mg>2.0.  Carmelina Leger FNP-C  Cardiology NP  SPECT[facility] [id number] [phone]    
 77 year old male with past medical history of A fib (on Eliquis), incomplete RBBB CAD s/p stents, CHF, HLD, HTN, CKD stage 3, DM2 GIB s/p PRBC transfusion 10/21, anxiety/depression,  recently admitted  for acute hypoxic respiratory failure 2/2 covid-19 pneumonia 1/3-1/18 then readmitted 1/23 with acute on chronic diastolic chf and readmitted 2/22 for chest pain now presenting with rectal bleeding. Reports two day history of rectal bleeding.    CAD s/p stent/Paroxysmal Afib/GIB  - EKG showed SR with 1st degree AVB incomplete RBBB inverted T waves lateral leads   - On home Eliquis.  Hold for now in the setting of GIB  - s/p colonoscopy.  Found to have old blood noted in the left colon, multiple diverticulum from proximal transverse to sigmoid but no active bleeding  - Continues to drop H/H with +bloody stools.  Per patient, had significant bloody stools this morning.  Symptomatic.  Receiving PRBC's at time of evaluation.    - Would hold home anti-HTN meds for now.  Resume once optimized from GI standpoint.    - TTE 01/6/ 2022 EF 55%, moderate LVH, mild MR, mild AS, mild TR.  No evidence of volume overload    Monitor and replete electrolytes. Keep K>4.0 and Mg>2.0.  Will continue to follow    Shilpa Kirkpatrick DNP, NP-C  Cardiology   Spectra #[phone]       
 77 year old male with past medical history of A fib (on Eliquis), incomplete RBBB CAD s/p stents, CHF, HLD, HTN, CKD stage 3, DM2 GIB s/p PRBC transfusion 10/21, anxiety/depression,  recently admitted  for acute hypoxic respiratory failure 2/2 covid-19 pneumonia 1/3-1/18 then readmitted 1/23 with acute on chronic diastolic chf and readmitted 2/22 for chest pain now presenting with rectal bleeding. Reports two day history of rectal bleeding.    CAD s/p stent/Paroxysmal Afib/GIB  - EKG showed SR with 1st degree AVB incomplete RBBB.  No anginal symptoms  - On home Eliquis.  Hold for now in the setting of GIB  - s/p colonoscopy.  Found to have old blood noted in the left colon, multiple diverticulum from proximal transverse to sigmoid but no active bleeding  - Continues to have +bloody stools.  s/p PRBC's total 4 units.  Continue to monitor and transfuse to keep Hgb~8  - Would hold home anti-HTN meds for now.  Resume once optimized from GI standpoint.    - TTE 01/6/ 2022 EF 55%, moderate LVH, mild MR, mild AS, mild TR.  No evidence of volume overload  - No evidence of volume overload.  He appears to be volume depleted  - Monitor and replete electrolytes. Keep K>4.0 and Mg>2.0.    Will continue to follow    Shilpa Kirkpatrick DNP, NP-C  Cardiology   Spectra #[phone]       
CKD 4  Anemia, GI bleed  Diabetes  h/o CHF  Hypertension  h/o SLE  Hypokalemia    Renal indices stable at baseline. PRBC transfusion. Monitor post transfusion h/h. GI evaluation. To continue current meds. Retacrit for anemia.   Monitor blood sugar levels. Insulin coverage as needed. Potassium supplementation. Monitor BP trend. Titrate BP meds as needed. Salt restriction.    Will follow electrolytes and renal function trend. Avoid nephrotoxic meds as possible. 
CKD 4  Anemia, GI bleed  Diabetes  h/o CHF  Hypertension  h/o SLE  Hypokalemia    Stable renal indices. PRBC transfusion. Monitor post transfusion h/h. GI follow up. To continue current meds. Retacrit for anemia.   Monitor blood sugar levels. Insulin coverage as needed. Potassium supplementation. Monitor BP trend. Titrate BP meds as needed. Salt restriction.    Will follow electrolytes and renal function trend. Avoid nephrotoxic meds as possible. 
Multifactorial anemia related to acute GI bleed and renal insufficiency.    has received 2 units of PRBC without complication.      Fe studies c/w iron deficiency, Ferritin 40  Anemia due to chronic disease  LGIB    hx HCV  Coagulopathy  INR 1.5, likely due to Eliquis    77 year old male with past medical history of A fib (on Eliquis), incomplete RBBB CAD s/p stents ( CHF, HLD, HTN, CKD stage 3, DM2 GIB s/p PRBC transfusion 10/21, anxiety/depression,  recently admitted  for acute hypoxic respiratory failure 2/2 covid-19 pneumonia 1/3-1/18 then readmitted 1/23 with acute on chronic diastolic CHF and readmitted 2/22 for chest pain now presenting with rectal bleeding xd PTA.     colonoscopy with poor prep. old blood present without clear source noted      RECOMMEND  Follow CBC, may need additional units blood    continue GI followup  resumption of anticoagulation as  per GI and cardiiology
 77 year old male with past medical history of A fib (on Eliquis), incomplete RBBB CAD s/p stents ( CHF, HLD, HTN, CKD stage 3, DM2 GIB s/p PRBC transfusion 10/21, anxiety/depression,  recently admitted  for acute hypoxic respiratory failure 2/2 covid-19 pneumonia 1/3-1/18 then readmitted 1/23 with acute on chronic diastolic chf and readmitted 2/22 for chest pain now presenting with rectal bleeding. Reports two day history of rectal bleeding.    CAD ChF HTN HLD   EKG revealing Sr 1st degree AVB incomplete RBBB inverted T waves lateral leads   occult positive, seen by GI   transfuse to keep hct > 26 given cad-infusing on exam PRBC  continue home coreg, statin, clonidine, norvasc hydralazine and isosorbide, bp stable 124/72  TTE 01/6/ 2022 EF 55%, moderate LVH, mild MR, mild AS, mild TR - no need to repeat   holding Eliquis plan for acute blood loss anemia  plan for colonoscopy today no cardiac objection to proceeding with low risk procedure   ARIELLA on CKD,4- monitor cr closely follow up renal consult , on po lasix   Monitor and replete electrolytes. Keep K>4.0 and Mg>2.0.  Carmelina Leger FNP-C  Cardiology NP  [facility] [id number] [phone]    
 77 year old male with past medical history of A fib (on Eliquis), incomplete RBBB CAD s/p stents, CHF, HLD, HTN, CKD stage 3, DM2 GIB s/p PRBC transfusion 10/21, anxiety/depression,  recently admitted  for acute hypoxic respiratory failure 2/2 covid-19 pneumonia 1/3-1/18 then readmitted 1/23 with acute on chronic diastolic chf and readmitted 2/22 for chest pain now presenting with rectal bleeding. Reports two day history of rectal bleeding.    CAD s/p stent/Paroxysmal Afib/GIB  - EKG showed SR with 1st degree AVB incomplete RBBB.  No anginal symptoms  - s/p colonoscopy.  Found to have old blood noted in the left colon, multiple diverticulum from proximal transverse to sigmoid but no active bleeding  - no further episodes of bloody stools.  s/p PRBC's total 4 units.  H/H remains stable, continue to monitor and transfuse to keep Hgb~8  - holding home Eliquis in the setting of GIB, restart when cleared by GI   - BP and HR stable   - home anti-HTN meds reintroduced on BB, CCB, hydral, clonidine, Imdur, c/w hold parameters   - TTE (1/6/ 2022) EF 55%, moderate LVH, mild MR, mild AS, mild TR  - No evidence of volume overload.  He appears to be volume depleted, hypernatremic though improving, would decrease Lasix dosing to 60 mg PO daily,    - Monitor and replete electrolytes. Keep K>4.0 and Mg>2.0.    - All other medical needs as per primary team.  - Other cardiovascular workup will depend on clinical course.  - Will continue to follow.    Avani Link, [facility]  Nurse Practitioner - Cardiology   Spectra #[phone]  
76 yo male with history of HCV and INR 1.5 on DOAC (Eliquis) for A-fib, CAD s/p stents on antiplatelet agent), found to have multifactorial anemia related to acute GI bleed with iron deficiency and renal insufficiency.        - colonoscopy with poor prep; old blood present without clear source noted  - CBC remains stable with no additional need for transfusion  - resumption of anticoagulation as  per GI and cardiology  - Venofer 100mg IV X3 days;  today is day 3 out of 3; continue B12 and folate  - no acute heme/onc intervention at this time; will sign off; please call with any additional questions
LGIB  receiving blood transfusion  hold AC  sp colonoscopy  monitor cbc  will fu  
Multifactorial anemia related to acute GI bleed and renal insufficiency.    has received 2 units of PRBC without complication.      Fe studies c/w iron deficiency, Ferritin 40  Anemia due to chronic disease  LGIB    hx HCV  Coagulopathy  INR 1.5, likely due to Eliquis    77 year old male with past medical history of A fib (on Eliquis), incomplete RBBB CAD s/p stents ( CHF, HLD, HTN, CKD stage 3, DM2 GIB s/p PRBC transfusion 10/21, anxiety/depression,  recently admitted  for acute hypoxic respiratory failure 2/2 covid-19 pneumonia 1/3-1/18 then readmitted 1/23 with acute on chronic diastolic CHF and readmitted 2/22 for chest pain now presenting with rectal bleeding xd PTA.       RECOMMEND  undergoing additional PRBC transfusion.   Follow CBC, may need additional units blood    Scheduled for colonoscopy today  Continue IV venofer.   HOLD Eliquis,   Appreciate cardiology evaluation  Further heme recommendations pending clinica course    SCD for DVT prophylaxis  
Multifactorial anemia related to acute GI bleed and renal insufficiency.    has received 2 units of PRBC without complication.      Fe studies c/w iron deficiency, Ferritin 40  Anemia due to chronic disease  LGIB    hx HCV  Coagulopathy  INR 1.5, likely due to Eliquis    77 year old male with past medical history of A fib (on Eliquis), incomplete RBBB CAD s/p stents ( CHF, HLD, HTN, CKD stage 3, DM2 GIB s/p PRBC transfusion 10/21, anxiety/depression,  recently admitted  for acute hypoxic respiratory failure 2/2 covid-19 pneumonia 1/3-1/18 then readmitted 1/23 with acute on chronic diastolic CHF and readmitted 2/22 for chest pain now presenting with rectal bleeding xd PTA.     colonoscopy with poor prep. old blood present without clear source noted      RECOMMEND  undergoing additional PRBC transfusion.   Follow CBC, may need additional units blood    continue GI followup
rectal bleed  acute blood loss anemia  hepatitis c    hold a/c  clear liquid diet  colonoscopy friday  bowel prep ordered  npo after midnight  may need to consider Vit K given elevated inr and rectal bleeding  check INR level  cbc bid  check hep c pcr  d/w patient    I reviewed the overnight course of events on the unit, re-confirming the patient history. I discussed the care with the patient and their family  The plan of care was discussed with the physician assistant and modifications were made to the notation where appropriate.   Differential diagnosis and plan of care discussed with patient after the evaluation  35 minutes spent on total encounter of which more than fifty percent of the encounter was spent counseling and/or coordinating care by the attending physician.  Advanced care planning was discussed with patient and family.  Advanced care planning forms were reviewed and discussed.  Risks, benefits and alternatives of gastroenterologic procedures were discussed in detail and all questions were answered.
CKD 4  Anemia, GI bleed  Diabetes  h/o CHF  Hypertension  h/o SLE  Hypokalemia    Stable renal indices. PRBC transfusion. Monitor post transfusion h/h. GI follow up. To continue current meds. Retacrit for anemia.   Monitor blood sugar levels. Insulin coverage as needed. Potassium supplementation. Monitor BP trend. Titrate BP meds as needed. Salt restriction.    Will follow electrolytes and renal function trend. D/w patient regarding need for out patient nephrology follow up. 
LGIB  sp blood transfusion  hold AC  sp colonoscopy  monitor cbc  advance diet  will fu  
LGIB    sp blood transfusion  sp colonoscopy  hold eliquis for 1 week from colonoscopy  monitor cbc  diet as tolerated  no further GI bleeding  no GI objection to d/c planning    I reviewed the overnight course of events on the unit, re-confirming the patient history. I discussed the care with the patient and their family  Differential diagnosis and plan of care discussed with patient after the evaluation  35 minutes spent on total encounter of which more than fifty percent of the encounter was spent counseling and/or coordinating care by the attending physician.  Advanced care planning was discussed with patient and family.  Advanced care planning forms were reviewed and discussed.  Risks, benefits and alternatives of gastroenterologic procedures were discussed in detail and all questions were answered.  
78 yo M PMH of A fib (on Eliquis) , MGUS s/p PRBC transfusion 10/21, anxiety/depression, CAD s/p stents (not on plavix), BPH, CHF, Diverticulosis, HLD, HTN, thyroid nodules, CKD stage 3, DM2 presents to the ED from [facility] after having up to 3 bowel movements  admitted for GIB/rectal bleeding. 
78 yo M PMH of A fib (on Eliquis) , MGUS s/p PRBC transfusion 10/21, anxiety/depression, CAD s/p stents (not on plavix), BPH, CHF, Diverticulosis, HLD, HTN, thyroid nodules, CKD stage 3, DM2 presents to the ED from [facility] after having up to 3 bowel movements  admitted for GIB/rectal bleeding. 
76 yo M PMH of A fib (on Eliquis) , MGUS s/p PRBC transfusion 10/21, anxiety/depression, CAD s/p stents (not on plavix), BPH, CHF, Diverticulosis, HLD, HTN, thyroid nodules, CKD stage 3, DM2 presents to the ED from [facility] after having up to 3 bowel movements  admitted for GIB/rectal bleeding. 
76 yo M PMH of A fib (on Eliquis) , MGUS s/p PRBC transfusion 10/21, anxiety/depression, CAD s/p stents (not on plavix), BPH, CHF, Diverticulosis, HLD, HTN, thyroid nodules, CKD stage 3, DM2 presents to the ED from [facility] after having up to 3 bowel movements  admitted for GIB/rectal bleeding. 
78 yo M PMH of A fib (on Eliquis) , MGUS s/p PRBC transfusion 10/21, anxiety/depression, CAD s/p stents (not on plavix), BPH, CHF, Diverticulosis, HLD, HTN, thyroid nodules, CKD stage 3, DM2 presents to the ED from [facility] after having up to 3 bowel movements  admitted for GIB/rectal bleeding.

## 2022-04-04 NOTE — ED PROVIDER NOTE - ATTENDING CONTRIBUTION TO CARE
Attending Allan: I performed a history and physical exam of the patient and discussed their management with the resident/fellow/ACP/student. I have reviewed the resident/fellow/ACP/student note and agree with the documented findings and plan of care, except as noted. I have personally performed a substantive portion of the visit including all aspects of the medical decision making. My medical decision making and observations are found above. Please refer to any progress notes for updates on clinical course.

## 2022-04-04 NOTE — CHART NOTE - NSCHARTNOTEFT_GEN_A_CORE
Called by RN for pt c/o rectal bleeding. Pt seen at bedside. He reports he had a bloody BM that was bright red blood and "filled up the bowl." No other complaints.    vitals stable  stat H/H ordered - Hb low stable ~8.1  NM bleeding scan ordered  NPO except meds  mckinnon placed for NM study  if NM scan reveals active bleeding, or if pt has further episodes of bleeding and/or becomes hemodynamically unstable, pt will likely require transfer to tertiary care facility for IR embolization.  discussed with Dr. Robertson. discussed with GI, cardio Called by RN for pt c/o rectal bleeding. Pt seen at bedside. He reports he had a bloody BM that was bright red blood and "filled up the bowl." No other complaints.    Vital Signs Last 24 Hrs  T(C): 36.7 (04 Apr 2022 13:33), Max: 36.9 (03 Apr 2022 20:04)  T(F): 98.1 (04 Apr 2022 13:33), Max: 98.4 (03 Apr 2022 20:04)  HR: 68 (04 Apr 2022 13:33) (56 - 72)  BP: 117/66 (04 Apr 2022 13:33) (117/66 - 174/85)  BP(mean): --  RR: 17 (04 Apr 2022 13:33) (17 - 18)  SpO2: 92% (04 Apr 2022 13:33) (92% - 95%)    - vitals stable. pt in no acute distress  - stat H/H ordered - Hb low but stable ~8.1  - if pt has no further episodes of rectal bleeding, will order f/u H/H @ 10pm  - NM bleeding scan ordered  - NPO except meds  - mckinnon placed for NM study  - if NM scan reveals active bleeding, or if pt has further episodes of bleeding and/or becomes hemodynamically unstable, pt will likely require transfer to tertiary care facility for IR embolization.  - discussed with Dr. Robertson. discussed with GI, cardio Called by RN for pt c/o rectal bleeding. Pt seen at bedside. He reports he had a bloody BM that was bright red blood and "filled up the bowl." No other complaints.    Vital Signs Last 24 Hrs  T(C): 36.7 (04 Apr 2022 13:33), Max: 36.9 (03 Apr 2022 20:04)  T(F): 98.1 (04 Apr 2022 13:33), Max: 98.4 (03 Apr 2022 20:04)  HR: 68 (04 Apr 2022 13:33) (56 - 72)  BP: 117/66 (04 Apr 2022 13:33) (117/66 - 174/85)  BP(mean): --  RR: 17 (04 Apr 2022 13:33) (17 - 18)  SpO2: 92% (04 Apr 2022 13:33) (92% - 95%)    - vitals stable. pt in no acute distress  - stat H/H ordered - Hb low but stable ~8.1  - if pt has no further episodes of rectal bleeding, will order f/u H/H @ 10pm  - NM bleeding scan ordered  - NPO except meds  - mckinnon placed for NM study  - if NM scan reveals active bleeding, or if pt has further episodes of bleeding and/or becomes hemodynamically unstable, pt will likely require transfer to tertiary care facility for IR embolization.  - dc ASA  - discussed with Dr. Robertson. discussed with GI, cardio

## 2022-04-04 NOTE — ED CLERICAL - NS ED CLERK NOTE PRE-ARRIVAL INFORMATION; ADDITIONAL PRE-ARRIVAL INFORMATION
This patient is enrolled in a readmission reduction initiative and has active care navigation. This patient can be followed up by the care navigation team within 24 hours.  To arrange close follow-up or to obtain additional clinical information, please call the care navigation contact number above.      For patients previously on the Hospitalist Service please call the hospitalist at [phone] for input and/or consultation PRIOR to admission. If the patient was on a Voluntary Physician’s service please contact that physician for input and/or consultation PRIOR to admission.

## 2022-04-04 NOTE — PROGRESS NOTE ADULT - PROBLEM SELECTOR PLAN 1
Patient presents with abdominal pain, rectal bleeding, fatigue likely 2/2 diverticular bleed  - H/H 9.2 on admission, baseline hemoglobin ~10,  HOLD Eliquis and ASA - d/w GI, will resume eliquis on 4/8 1 week after colonoscopy as long as no further episodes of rectal bleeding  - s/p 4u pRBC so far in hospital course  - Hb stable today @ 8.4. No further episodes rectal bleeding.  - CT a/p: diverticulosis without diverticulitis  - advanced to regular consistent carb diet  - Protonix 40 mg qD  - Hold antiplatelets, NSAIDs - d/w GI, will resume ASA on discharge  - B12, folate wnl; iron panel w low serum iron   - GI (Dr. Bah) consulted, recs appreciated. colonoscopy 4/1 revealed old blood and diverticuli but no active bleeding Patient presents with abdominal pain, rectal bleeding, fatigue likely 2/2 diverticular bleed  - H/H 9.2 on admission, baseline hemoglobin ~10,  HOLD Eliquis until 4/8 and resume ASA today - d/w GI, will resume eliquis on 4/8 1 week after colonoscopy as long as no further episodes of rectal bleeding  - s/p 4u pRBC so far in hospital course  - Hb stable today @ 8.4. No further episodes rectal bleeding.  - CT a/p: diverticulosis without diverticulitis  - advanced to regular consistent carb diet  - Protonix 40 mg qD  - RESUMING home aspirin 81mg today as discussed with GI. hold eliquis until 4/8  - B12, folate wnl; iron panel w low serum iron   - GI (Dr. Bah) consulted, recs appreciated. colonoscopy 4/1 revealed old blood and diverticuli but no active bleeding  - strict stool counts and documentation of rectal bleeding/melena Patient presents with abdominal pain, rectal bleeding, fatigue likely 2/2 diverticular bleed  - H/H 9.2 on admission, baseline hemoglobin ~10,  HOLD Eliquis until 4/8 and resume ASA today - d/w GI, will resume eliquis on 4/8 1 week after colonoscopy as long as no further episodes of rectal bleeding  - s/p 4u pRBC so far in hospital course  - Hb stable today @ 8.4. No further episodes rectal bleeding.  - CT a/p: diverticulosis without diverticulitis  - advanced to regular consistent carb diet  - Protonix 40 mg qD  -HOLD ASA & AC , pt got 1 dose of ASA this AM   - B12, folate wnl; iron panel w low serum iron   - GI (Dr. Bah) consulted, recs appreciated. colonoscopy 4/1 revealed old blood and diverticuli but no active bleeding  - strict stool counts and documentation of rectal bleeding/melena

## 2022-04-04 NOTE — PROGRESS NOTE ADULT - PROBLEM/PLAN-6
----- Message from Jacqueline Chew MD sent at 4/21/2021  4:35 PM EDT -----  The ultrasound is normal.  Would recommend nephrology consult with Dr. Ermias Whitfield or Leonel Rojas
Patient advised and will check her insurance and get back with us on whom she is going to see for a referral.
DISPLAY PLAN FREE TEXT

## 2022-04-04 NOTE — PROGRESS NOTE ADULT - PROBLEM SELECTOR PLAN 9
Chronic, stable on admission  - Continue home amlodipine 10mg qD, clonidine 0.2mg TID, hydralazine 100mg TID, Imdur 90mg qD with hold parameters Chronic, stable on admission  - Continue home amlodipine 10mg qD, clonidine 0.2mg TID, hydralazine 100mg TID, Imdur 90mg qD , Coreg & Lasix  with hold parameters

## 2022-04-04 NOTE — PROGRESS NOTE ADULT - SUBJECTIVE AND OBJECTIVE BOX
Patient is a 77y old  Male who presents with a chief complaint of Rectal bleed (03 Apr 2022 22:34)    HPI:  78 yo M PMH of A fib (on Eliquis) , MGUS s/p PRBC transfusion 10/21, anxiety/depression, CAD s/p stents (not on plavix), BPH, CHF, Diverticulosis, HLD, HTN, thyroid nodules, CKD stage 3, DM2 presents to the ED from [facility] after having up to 3 bowel movements with dark/black stool. Patient would have another episode of dark stool in the ED. Patient denied consuming any beets or magnesium/calcium supplements recently. Patient states that onset of stool was very sudden. Patient recently admitted to [facility] in January for CHF exacerbation, improved w/ diuresis. Patient was seen and examined at bedside, denied any headache, cp/pressure, palpitations, myalgias, fevers/chills, SOB, hematuria, dysuria. Patient endorses lightheadedness/dizziness, 4/10 non-radiating sharp periumbilical pain, and chronic knee pain.     In ED:   Vitals: 97.5F, HR 69, 133/81, RR 18, 96% on RA   Labs significant for: Occult blood +, GFR 29, INR 2.01, Creatinine 2.3 (baseline 2.2)  Imaging: CT chest: Few 2mm nodules, coronary artery calcification  CT abd/pelvis: diverticulosis w/o diverticulitis   CT head: No acute intracranial hemorrhage or acute territorial infarct   EKG: Sinus rhythm w/ 1st degree heart block @ 60bpm, incomplete RBBB unchanged from prior ekg   Recieved: Pantoprazole infusion   (30 Mar 2022 15:40)    INTERVAL HPI:   3/31/2022: Patient had two more bowel movements with dark stool from yesterday. VSS. Patient seen and examined at bedside, denies any headache, cp/pressure/palpitations, abd pain, dysuria/hematuria, SOB, nausea/vomiting, fevers/chills. Patient continues to endorse lightheadedness/dizziness, and fatigue. AM hgb showed that patients hgb had dropped to 7.1 from 9.2, patient to receive 2 units of PRBC.  04/01/2022: Pt episode of mild hematochezia this am. States he still experiences lightheadedness with dizziness. Denies fever, chills, chest pain, abd pain, sob, palpitations, n/v/d/c. Pt w Hb 7.6 this am. received a unit of prbc. NPO for colonoscopy. Colonoscopy done 4/1 that revealed old blood but no active bleeding. Hb did not have adequate response to prbc.  4/2/22: Patient seen and examined at bedside. No overnight events occurred. Pt says he wants regular food. He reports a mild headache but denies chest pain, SOB, abd pain, nausea, vomiting, fever, chills. Tolerating PO. Ambulated to bathroom. Pt DENIES further episodes of rectal bleeding - confirmed with RN, no reported bloody BMs. Last BM yesterday. Low H/H. Pt received 1u pRBC with good response Hb 7.6 --> 8.6. Pt advanced to full liquid diet.  4/3/22: Pt seen and examined at bedside. No overnight events occurred. Pt says he does not like the liquid diet and wants to eat solid food. He denies any further episodes of rectal bleeding but also reports he has not had a BM in 2 days. He reports a mild headache but denies fevers, chills, chest pain, dyspnea, abdominal pain, n/v/d/c. Has been OOB and ambulating. Denies dysuria. H/H Low stable.  4/4/22: Pt seen and examined at bedside. No overnight events occurred. Pt advanced to solid diet yesterday and pt is tolerating it well. He reports some mild LLQ pain 3/10 that comes and goes but denies other complaints including fevers, chills, headache, chest pain, dyspnea, nausea, vomiting, rectal bleeding.      OVERNIGHT EVENTS: NONE    Home Medications:  Aristada 1064 mg/3.9 mL intramuscular suspension, extended release: 1 dose(s) intramuscular every 8 weeks (30 Mar 2022 15:58)  carvedilol 6.25 mg oral tablet: 1 tab(s) orally 2 times a day (30 Mar 2022 15:58)  cloNIDine 0.2 mg oral tablet: 1 tab(s) orally 3 times a day (30 Mar 2022 15:58)  diclofenac 1% topical gel: Apply topically to affected area 3 times a day (30 Mar 2022 15:58)  doxazosin 4 mg oral tablet: 1 tab(s) orally 2 times a day (30 Mar 2022 15:58)  furosemide 20 mg oral tablet: 3 tab(s) orally 2 times a day (30 Mar 2022 15:58)  lactobacillus acidophilus oral capsule: 1 cap(s) orally 2 times a day (30 Mar 2022 15:58)  lamoTRIgine 100 mg oral tablet: 1 tab(s) orally once a day (30 Mar 2022 15:58)  oxybutynin 15 mg/24 hr oral tablet, extended release: 1 tab(s) orally once a day (30 Mar 2022 15:58)  Vitamin B-12 1000 mcg oral tablet: 1 tab(s) orally once a day (30 Mar 2022 15:58)      MEDICATIONS  (STANDING):  amLODIPine   Tablet 10 milliGRAM(s) Oral daily  atorvastatin 10 milliGRAM(s) Oral at bedtime  budesonide 160 MICROgram(s)/formoterol 4.5 MICROgram(s) Inhaler 2 Puff(s) Inhalation two times a day  carvedilol 6.25 milliGRAM(s) Oral every 12 hours  cloNIDine 0.2 milliGRAM(s) Oral three times a day  cyanocobalamin 1000 MICROGram(s) Oral daily  dextrose 5%. 1000 milliLiter(s) (50 mL/Hr) IV Continuous <Continuous>  dextrose 5%. 1000 milliLiter(s) (100 mL/Hr) IV Continuous <Continuous>  dextrose 50% Injectable 25 Gram(s) IV Push once  dextrose 50% Injectable 12.5 Gram(s) IV Push once  dextrose 50% Injectable 25 Gram(s) IV Push once  doxazosin 4 milliGRAM(s) Oral <User Schedule>  folic acid 1 milliGRAM(s) Oral daily  furosemide    Tablet 60 milliGRAM(s) Oral two times a day  glucagon  Injectable 1 milliGRAM(s) IntraMuscular once  hydrALAZINE 100 milliGRAM(s) Oral three times a day  insulin lispro (ADMELOG) corrective regimen sliding scale   SubCutaneous three times a day before meals  insulin lispro (ADMELOG) corrective regimen sliding scale   SubCutaneous at bedtime  iron sucrose Injectable 100 milliGRAM(s) IV Push every 24 hours  isosorbide   mononitrate ER Tablet (IMDUR) 90 milliGRAM(s) Oral daily  lamoTRIgine 100 milliGRAM(s) Oral daily  oxybutynin XL 15 milliGRAM(s) Oral daily  pantoprazole  Injectable 40 milliGRAM(s) IV Push daily  polyethylene glycol 3350 17 Gram(s) Oral daily  potassium chloride    Tablet ER 20 milliEquivalent(s) Oral daily  senna 2 Tablet(s) Oral at bedtime  venlafaxine XR. 150 milliGRAM(s) Oral daily    MEDICATIONS  (PRN):  acetaminophen     Tablet .. 650 milliGRAM(s) Oral every 6 hours PRN Temp greater or equal to 38C (100.4F), Mild Pain (1 - 3)  aluminum hydroxide/magnesium hydroxide/simethicone Suspension 30 milliLiter(s) Oral every 4 hours PRN Dyspepsia  dextrose Oral Gel 15 Gram(s) Oral once PRN Blood Glucose LESS THAN 70 milliGRAM(s)/deciliter  melatonin 3 milliGRAM(s) Oral at bedtime PRN Insomnia  ondansetron Injectable 4 milliGRAM(s) IV Push every 8 hours PRN Nausea and/or Vomiting      Allergies    No Known Allergies    Intolerances        Social History:  Lives in [facility]   ADL independent   Ambulates w/ a walker   Former smoker, quit 45 years ago, ~8 pack years   Alcohol denies   Drugs denies   COVID Moderna x2 (30 Mar 2022 15:40)      REVIEW OF SYSTEMS:  CONSTITUTIONAL: No fever, No chills, No fatigue, No myalgia, No Body ache, No Weakness No headache  EYES: No eye pain,  No visual disturbances, No discharge, NO Redness  ENMT:  No ear pain, No nose bleed, No vertigo; No sinus pain, NO throat pain, No Congestion  NECK: No pain, No stiffness  RESPIRATORY: No cough, NO wheezing, No  hemoptysis, NO  shortness of breath  CARDIOVASCULAR: No chest pain, palpitations  GASTROINTESTINAL: [x] abdominal pain, NO epigastric pain. No nausea, No vomiting; No diarrhea, No constipation. [ x ] No BM, no rectal bleeding  GENITOURINARY: No dysuria, No frequency, No urgency, No hematuria, NO incontinence  NEUROLOGICAL: No headaches, No dizziness, No numbness, No tingling, No tremors, No weakness  EXT: No Swelling, No Pain, No Edema  SKIN:  [ x ] No itching, burning, rashes, or lesions   MUSCULOSKELETAL: No joint pain ,No Jt swelling; No muscle pain, No back pain, No extremity pain  PSYCHIATRIC: No depression,  No anxiety,  No mood swings ,No difficulty sleeping at night  PAIN SCALE: [ x ] None  [ x ] Other- 3/10 abd pain intermittent  ROS Unable to obtain due to - [  ] Dementia  [  ] Lethargy [  ] Drowsy [  ] Sedated [  ] non verbal  REST OF REVIEW Of SYSTEM - [ x ] Normal     Vital Signs Last 24 Hrs  T(C): 36.8 (04 Apr 2022 04:17), Max: 37 (03 Apr 2022 11:20)  T(F): 98.3 (04 Apr 2022 04:17), Max: 98.6 (03 Apr 2022 11:20)  HR: 56 (04 Apr 2022 08:00) (56 - 72)  BP: 144/82 (04 Apr 2022 08:00) (133/73 - 174/85)  BP(mean): --  RR: 18 (04 Apr 2022 04:17) (18 - 18)  SpO2: 95% (04 Apr 2022 04:17) (94% - 96%)  Finger Stick          PHYSICAL EXAM:  GENERAL:  [ x ] NAD , [ x ] well appearing, [  ] Agitated, [  ] Drowsy,  [  ] Lethargy, [  ] confused   HEAD:  [ x ] Normal, [  ] Other  EYES:  [ x ] EOMI, [ x ] PERRLA, [ x ] conjunctiva and sclera clear normal, [  ] Other,  [ x ] Pallor,[  ] Discharge  ENMT:  [ x ] Normal, [ x ] Moist mucous membranes, [ x ] Good dentition, [ x ] No Thrush  NECK:  [ x ] Supple, [ x ] No JVD, [ x ] Normal thyroid, [  ] Lymphadenopathy [  ] Other  CHEST/LUNG:  [ x ] Clear to auscultation bilaterally, [ x ] Breath Sounds equal B/L, [  ] poor effort  [ x ] No rales, [ x ] No rhonchi  [ x ]  No wheezing,   HEART:  [ x ] Regular rate and rhythm, [  ] tachycardia, [  ] Bradycardia,  [  ] irregular  [ x ] systolic murmur, No rubs, No gallops, [  ] PPM in place (Mfr:  )  ABDOMEN:  [ x ] Soft, [ x ] Nontender, [ x ] Nondistended, [ x ] No mass, [  ] Bowel sounds present, [ x ] obese  NERVOUS SYSTEM:  [ x ] Alert & Oriented x3, [ x ] Nonfocal  [  ] Confusion  [  ] Encephalopathic [  ] Sedated [  ] Unable to assess, [  ] Dementia [  ] Other-  EXTREMITIES: [  ] 2+ Peripheral Pulses, No clubbing, No cyanosis,  [ x ] No edema B/L lower EXT. [  ] PVD stasis skin changes B/L Lower EXT, [  ] wound  LYMPH: No lymphadenopathy noted  SKIN:  [ x ] No rashes or lesions, [  ] Pressure Ulcers, [  ] ecchymosis, [  ] Skin Tears, [  ] Other      DIET: Diet, Consistent Carbohydrate w/Evening Snack:   DASH/TLC Sodium & Cholesterol Restricted (04-03-22 @ 16:10)      LABS:                        8.4    6.79  )-----------( 196      ( 04 Apr 2022 08:27 )             25.4     04 Apr 2022 08:27    142    |  108    |  23     ----------------------------<  117    3.8     |  28     |  2.00     Ca    8.8        04 Apr 2022 08:27  Phos  3.8       04 Apr 2022 08:27  Mg     2.2       04 Apr 2022 08:27    TPro  5.6    /  Alb  2.8    /  TBili  0.2    /  DBili  x      /  AST  13     /  ALT  16     /  AlkPhos  52     04 Apr 2022 08:27                             Anemia Panel:  Iron Total, Serum: 33 ug/dL (03-31-22 @ 11:49)  Iron - Total Binding Capacity.: 230 ug/dL (03-31-22 @ 11:49)  Ferritin, Serum: 40 ng/mL (03-31-22 @ 11:49)  Folate, Serum: >20.0 ng/mL (03-31-22 @ 11:49)  Vitamin B12, Serum: 1442 pg/mL (03-31-22 @ 11:49)      Thyroid Panel:  Thyroid Stimulating Hormone, Serum: 1.19 uIU/mL (03-31-22 @ 08:09)                RADIOLOGY & ADDITIONAL TESTS:      HEALTH ISSUES - PROBLEM Dx:  GIB (gastrointestinal bleeding)    Anxiety and depression    Type 2 diabetes mellitus    HTN (hypertension)    HLD (hyperlipidemia)    DVT prophylaxis    Anemia    CAD (coronary artery disease)    Atrial fibrillation    Chronic CHF    Stage 3 chronic kidney disease    Abnormal finding on lung imaging            Consultant(s) Notes Reviewed:  [ x ] YES     Care Discussed with [X] Consultants  [ x ] Patient  [  ] Family [  ] HCP [  ]   [  ] Social Service  [  ] RN, [  ] Physical Therapy,[  ] Palliative care team  DVT PPX: [  ] Lovenox, [  ] S C Heparin, [  ] Coumadin, [  ] Xarelto, [  ] Eliquis, [  ] Pradaxa, [  ] IV Heparin drip, [ x ] SCD [  ] Contraindication 2 to GI Bleed,[  ] Ambulation [  ] Contraindicated 2 to  bleed [  ] Contraindicated 2 to Brain Bleed  Advanced directive: [ x ] None, [  ] DNR/DNI Patient is a 77y old  Male who presents with a chief complaint of Rectal bleed (03 Apr 2022 22:34)    HPI:  76 yo M PMH of A fib (on Eliquis) , 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 Patient would have another episode of dark stool in the ED. Patient denied consuming any beets or magnesium/calcium supplements recently. Patient states that onset of stool was very sudden. Patient recently admitted to [facility] in January for CHF exacerbation, improved w/ diuresis. Patient was seen and examined at bedside, denied any headache, cp/pressure, palpitations, myalgias, fevers/chills, SOB, hematuria, dysuria. Patient endorses lightheadedness/dizziness, 4/10 non-radiating sharp periumbilical pain, and chronic knee pain.     In ED:   Vitals: 97.5F, HR 69, 133/81, RR 18, 96% on RA   Labs significant for: Occult blood +, GFR 29, INR 2.01, Creatinine 2.3 (baseline 2.2)  Imaging: CT chest: Few 2mm nodules, coronary artery calcification  CT abd/pelvis: diverticulosis w/o diverticulitis   CT head: No acute intracranial hemorrhage or acute territorial infarct   EKG: Sinus rhythm w/ 1st degree heart block @ 60bpm, incomplete RBBB unchanged from prior ekg   Recieved: Pantoprazole infusion   (30 Mar 2022 15:40)    INTERVAL HPI:   3/31/2022: Patient had two more bowel movements with dark stool from yesterday. VSS. Patient seen and examined at bedside, denies any headache, cp/pressure/palpitations, abd pain, dysuria/hematuria, SOB, nausea/vomiting, fevers/chills. Patient continues to endorse lightheadedness/dizziness, and fatigue. AM hgb showed that patients hgb had dropped to 7.1 from 9.2, patient to receive 2 units of PRBC.  04/01/2022: Pt episode of mild hematochezia this am. States he still experiences lightheadedness with dizziness. Denies fever, chills, chest pain, abd pain, sob, palpitations, n/v/d/c. Pt w Hb 7.6 this am. received a unit of prbc. NPO for colonoscopy. Colonoscopy done 4/1 that revealed old blood but no active bleeding. Hb did not have adequate response to prbc.  4/2/22: Patient seen and examined at bedside. No overnight events occurred. Pt says he wants regular food. He reports a mild headache but denies chest pain, SOB, abd pain, nausea, vomiting, fever, chills. Tolerating PO. Ambulated to bathroom. Pt DENIES further episodes of rectal bleeding - confirmed with RN, no reported bloody BMs. Last BM yesterday. Low H/H. Pt received 1u pRBC with good response Hb 7.6 --> 8.6. Pt advanced to full liquid diet.  4/3/22: Pt seen and examined at bedside. No overnight events occurred. Pt says he does not like the liquid diet and wants to eat solid food. He denies any further episodes of rectal bleeding but also reports he has not had a BM in 2 days. He reports a mild headache but denies fevers, chills, chest pain, dyspnea, abdominal pain, n/v/d/c. Has been OOB and ambulating. Denies dysuria. H/H Low stable.  4/4/22: Pt seen and examined at bedside. No overnight events occurred. Pt advanced to solid diet yesterday and pt is tolerating it well. He reports some mild LLQ pain 3/10 that comes and goes but denies other complaints including fevers, chills, headache, chest pain, dyspnea, nausea, vomiting, rectal bleeding. Pt reports NO episodes of bloody stool over last 2 days. No melena or rectal bleeding documented by nursing staff.      OVERNIGHT EVENTS: NONE    Home Medications:  Aristada 1064 mg/3.9 mL intramuscular suspension, extended release: 1 dose(s) intramuscular every 8 weeks (30 Mar 2022 15:58)  carvedilol 6.25 mg oral tablet: 1 tab(s) orally 2 times a day (30 Mar 2022 15:58)  cloNIDine 0.2 mg oral tablet: 1 tab(s) orally 3 times a day (30 Mar 2022 15:58)  diclofenac 1% topical gel: Apply topically to affected area 3 times a day (30 Mar 2022 15:58)  doxazosin 4 mg oral tablet: 1 tab(s) orally 2 times a day (30 Mar 2022 15:58)  furosemide 20 mg oral tablet: 3 tab(s) orally 2 times a day (30 Mar 2022 15:58)  lactobacillus acidophilus oral capsule: 1 cap(s) orally 2 times a day (30 Mar 2022 15:58)  lamoTRIgine 100 mg oral tablet: 1 tab(s) orally once a day (30 Mar 2022 15:58)  oxybutynin 15 mg/24 hr oral tablet, extended release: 1 tab(s) orally once a day (30 Mar 2022 15:58)  Vitamin B-12 1000 mcg oral tablet: 1 tab(s) orally once a day (30 Mar 2022 15:58)      MEDICATIONS  (STANDING):  amLODIPine   Tablet 10 milliGRAM(s) Oral daily  atorvastatin 10 milliGRAM(s) Oral at bedtime  budesonide 160 MICROgram(s)/formoterol 4.5 MICROgram(s) Inhaler 2 Puff(s) Inhalation two times a day  carvedilol 6.25 milliGRAM(s) Oral every 12 hours  cloNIDine 0.2 milliGRAM(s) Oral three times a day  cyanocobalamin 1000 MICROGram(s) Oral daily  dextrose 5%. 1000 milliLiter(s) (50 mL/Hr) IV Continuous <Continuous>  dextrose 5%. 1000 milliLiter(s) (100 mL/Hr) IV Continuous <Continuous>  dextrose 50% Injectable 25 Gram(s) IV Push once  dextrose 50% Injectable 12.5 Gram(s) IV Push once  dextrose 50% Injectable 25 Gram(s) IV Push once  doxazosin 4 milliGRAM(s) Oral <User Schedule>  folic acid 1 milliGRAM(s) Oral daily  furosemide    Tablet 60 milliGRAM(s) Oral two times a day  glucagon  Injectable 1 milliGRAM(s) IntraMuscular once  hydrALAZINE 100 milliGRAM(s) Oral three times a day  insulin lispro (ADMELOG) corrective regimen sliding scale   SubCutaneous three times a day before meals  insulin lispro (ADMELOG) corrective regimen sliding scale   SubCutaneous at bedtime  iron sucrose Injectable 100 milliGRAM(s) IV Push every 24 hours  isosorbide   mononitrate ER Tablet (IMDUR) 90 milliGRAM(s) Oral daily  lamoTRIgine 100 milliGRAM(s) Oral daily  oxybutynin XL 15 milliGRAM(s) Oral daily  pantoprazole  Injectable 40 milliGRAM(s) IV Push daily  polyethylene glycol 3350 17 Gram(s) Oral daily  potassium chloride    Tablet ER 20 milliEquivalent(s) Oral daily  senna 2 Tablet(s) Oral at bedtime  venlafaxine XR. 150 milliGRAM(s) Oral daily    MEDICATIONS  (PRN):  acetaminophen     Tablet .. 650 milliGRAM(s) Oral every 6 hours PRN Temp greater or equal to 38C (100.4F), Mild Pain (1 - 3)  aluminum hydroxide/magnesium hydroxide/simethicone Suspension 30 milliLiter(s) Oral every 4 hours PRN Dyspepsia  dextrose Oral Gel 15 Gram(s) Oral once PRN Blood Glucose LESS THAN 70 milliGRAM(s)/deciliter  melatonin 3 milliGRAM(s) Oral at bedtime PRN Insomnia  ondansetron Injectable 4 milliGRAM(s) IV Push every 8 hours PRN Nausea and/or Vomiting      Allergies    No Known Allergies    Intolerances        Social History:  Lives in [facility]   ADL independent   Ambulates w/ a walker   Former smoker, quit 45 years ago, ~8 pack years   Alcohol denies   Drugs denies   COVID Moderna x2 (30 Mar 2022 15:40)      REVIEW OF SYSTEMS:  CONSTITUTIONAL: No fever, No chills, No fatigue, No myalgia, No Body ache, No Weakness No headache  EYES: No eye pain,  No visual disturbances, No discharge, NO Redness  ENMT:  No ear pain, No nose bleed, No vertigo; No sinus pain, NO throat pain, No Congestion  NECK: No pain, No stiffness  RESPIRATORY: No cough, NO wheezing, No  hemoptysis, NO  shortness of breath  CARDIOVASCULAR: No chest pain, palpitations  GASTROINTESTINAL: [x] abdominal pain, NO epigastric pain. No nausea, No vomiting; No diarrhea, No constipation. [ x ] No BM, no rectal bleeding  GENITOURINARY: No dysuria, No frequency, No urgency, No hematuria, NO incontinence  NEUROLOGICAL: No headaches, No dizziness, No numbness, No tingling, No tremors, No weakness  EXT: No Swelling, No Pain, No Edema  SKIN:  [ x ] No itching, burning, rashes, or lesions   MUSCULOSKELETAL: No joint pain ,No Jt swelling; No muscle pain, No back pain, No extremity pain  PSYCHIATRIC: No depression,  No anxiety,  No mood swings ,No difficulty sleeping at night  PAIN SCALE: [ x ] None  [ x ] Other- 3/10 abd pain intermittent  ROS Unable to obtain due to - [  ] Dementia  [  ] Lethargy [  ] Drowsy [  ] Sedated [  ] non verbal  REST OF REVIEW Of SYSTEM - [ x ] Normal     Vital Signs Last 24 Hrs  T(C): 36.8 (04 Apr 2022 04:17), Max: 37 (03 Apr 2022 11:20)  T(F): 98.3 (04 Apr 2022 04:17), Max: 98.6 (03 Apr 2022 11:20)  HR: 56 (04 Apr 2022 08:00) (56 - 72)  BP: 144/82 (04 Apr 2022 08:00) (133/73 - 174/85)  BP(mean): --  RR: 18 (04 Apr 2022 04:17) (18 - 18)  SpO2: 95% (04 Apr 2022 04:17) (94% - 96%)  Finger Stick          PHYSICAL EXAM:  GENERAL:  [ x ] NAD , [ x ] well appearing, [  ] Agitated, [  ] Drowsy,  [  ] Lethargy, [  ] confused   HEAD:  [ x ] Normal, [  ] Other  EYES:  [ x ] EOMI, [ x ] PERRLA, [ x ] conjunctiva and sclera clear normal, [  ] Other,  [ x ] Pallor,[  ] Discharge  ENMT:  [ x ] Normal, [ x ] Moist mucous membranes, [ x ] Good dentition, [ x ] No Thrush  NECK:  [ x ] Supple, [ x ] No JVD, [ x ] Normal thyroid, [  ] Lymphadenopathy [  ] Other  CHEST/LUNG:  [ x ] Clear to auscultation bilaterally, [ x ] Breath Sounds equal B/L, [  ] poor effort  [ x ] No rales, [ x ] No rhonchi  [ x ]  No wheezing,   HEART:  [ x ] Regular rate and rhythm, [  ] tachycardia, [  ] Bradycardia,  [  ] irregular  [ x ] systolic murmur, No rubs, No gallops, [  ] PPM in place (Mfr:  )  ABDOMEN:  [ x ] Soft, [ x ] Nontender, [ x ] Nondistended, [ x ] No mass, [  ] Bowel sounds present, [ x ] obese  NERVOUS SYSTEM:  [ x ] Alert & Oriented x3, [ x ] Nonfocal  [  ] Confusion  [  ] Encephalopathic [  ] Sedated [  ] Unable to assess, [  ] Dementia [  ] Other-  EXTREMITIES: [  ] 2+ Peripheral Pulses, No clubbing, No cyanosis,  [ x ] No edema B/L lower EXT. [  ] PVD stasis skin changes B/L Lower EXT, [  ] wound  LYMPH: No lymphadenopathy noted  SKIN:  [ x ] No rashes or lesions, [  ] Pressure Ulcers, [  ] ecchymosis, [  ] Skin Tears, [  ] Other      DIET: Diet, Consistent Carbohydrate w/Evening Snack:   DASH/TLC Sodium & Cholesterol Restricted (04-03-22 @ 16:10)      LABS:                        8.4    6.79  )-----------( 196      ( 04 Apr 2022 08:27 )             25.4     04 Apr 2022 08:27    142    |  108    |  23     ----------------------------<  117    3.8     |  28     |  2.00     Ca    8.8        04 Apr 2022 08:27  Phos  3.8       04 Apr 2022 08:27  Mg     2.2       04 Apr 2022 08:27    TPro  5.6    /  Alb  2.8    /  TBili  0.2    /  DBili  x      /  AST  13     /  ALT  16     /  AlkPhos  52     04 Apr 2022 08:27                             Anemia Panel:  Iron Total, Serum: 33 ug/dL (03-31-22 @ 11:49)  Iron - Total Binding Capacity.: 230 ug/dL (03-31-22 @ 11:49)  Ferritin, Serum: 40 ng/mL (03-31-22 @ 11:49)  Folate, Serum: >20.0 ng/mL (03-31-22 @ 11:49)  Vitamin B12, Serum: 1442 pg/mL (03-31-22 @ 11:49)      Thyroid Panel:  Thyroid Stimulating Hormone, Serum: 1.19 uIU/mL (03-31-22 @ 08:09)                RADIOLOGY & ADDITIONAL TESTS:      HEALTH ISSUES - PROBLEM Dx:  GIB (gastrointestinal bleeding)    Anxiety and depression    Type 2 diabetes mellitus    HTN (hypertension)    HLD (hyperlipidemia)    DVT prophylaxis    Anemia    CAD (coronary artery disease)    Atrial fibrillation    Chronic CHF    Stage 3 chronic kidney disease    Abnormal finding on lung imaging            Consultant(s) Notes Reviewed:  [ x ] YES     Care Discussed with [X] Consultants  [ x ] Patient  [  ] Family [  ] HCP [  ]   [  ] Social Service  [  ] RN, [  ] Physical Therapy,[  ] Palliative care team  DVT PPX: [  ] Lovenox, [  ] S C Heparin, [  ] Coumadin, [  ] Xarelto, [  ] Eliquis, [  ] Pradaxa, [  ] IV Heparin drip, [ x ] SCD [  ] Contraindication 2 to GI Bleed,[  ] Ambulation [  ] Contraindicated 2 to  bleed [  ] Contraindicated 2 to Brain Bleed  Advanced directive: [ x ] None, [  ] DNR/DNI Patient is a 77y old  Male who presents with a chief complaint of Rectal bleed (03 Apr 2022 22:34)    HPI:  78 yo M PMH of A fib (on Eliquis) , 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 Patient would have another episode of dark stool in the ED. Patient denied consuming any beets or magnesium/calcium supplements recently. Patient states that onset of stool was very sudden. Patient recently admitted to [facility] in January for CHF exacerbation, improved w/ diuresis. Patient was seen and examined at bedside, denied any headache, cp/pressure, palpitations, myalgias, fevers/chills, SOB, hematuria, dysuria. Patient endorses lightheadedness/dizziness, 4/10 non-radiating sharp periumbilical pain, and chronic knee pain.     In ED:   Vitals: 97.5F, HR 69, 133/81, RR 18, 96% on RA   Labs significant for: Occult blood +, GFR 29, INR 2.01, Creatinine 2.3 (baseline 2.2)  Imaging: CT chest: Few 2mm nodules, coronary artery calcification  CT abd/pelvis: diverticulosis w/o diverticulitis   CT head: No acute intracranial hemorrhage or acute territorial infarct   EKG: Sinus rhythm w/ 1st degree heart block @ 60bpm, incomplete RBBB unchanged from prior ekg   Recieved: Pantoprazole infusion   (30 Mar 2022 15:40)    INTERVAL HPI:   3/31/2022: Patient had two more bowel movements with dark stool from yesterday. VSS. Patient seen and examined at bedside, denies any headache, cp/pressure/palpitations, abd pain, dysuria/hematuria, SOB, nausea/vomiting, fevers/chills. Patient continues to endorse lightheadedness/dizziness, and fatigue. AM hgb showed that patients hgb had dropped to 7.1 from 9.2, patient to receive 2 units of PRBC.  04/01/2022: Pt episode of mild hematochezia this am. States he still experiences lightheadedness with dizziness. Denies fever, chills, chest pain, abd pain, sob, palpitations, n/v/d/c. Pt w Hb 7.6 this am. received a unit of prbc. NPO for colonoscopy. Colonoscopy done 4/1 that revealed old blood but no active bleeding. Hb did not have adequate response to prbc.  4/2/22: Patient seen and examined at bedside. No overnight events occurred. Pt says he wants regular food. He reports a mild headache but denies chest pain, SOB, abd pain, nausea, vomiting, fever, chills. Tolerating PO. Ambulated to bathroom. Pt DENIES further episodes of rectal bleeding - confirmed with RN, no reported bloody BMs. Last BM yesterday. Low H/H. Pt received 1u pRBC with good response Hb 7.6 --> 8.6. Pt advanced to full liquid diet.  4/3/22: Pt seen and examined at bedside. No overnight events occurred. Pt says he does not like the liquid diet and wants to eat solid food. He denies any further episodes of rectal bleeding but also reports he has not had a BM in 2 days. He reports a mild headache but denies fevers, chills, chest pain, dyspnea, abdominal pain, n/v/d/c. Has been OOB and ambulating. Denies dysuria. H/H Low stable.  4/4/22: Pt seen and examined at bedside. No overnight events occurred. Pt advanced to solid diet yesterday and pt is tolerating it well. He reports some mild LLQ pain 3/10 that comes and goes but denies other complaints including fevers, chills, headache, chest pain, dyspnea, nausea, vomiting, rectal bleeding. Pt reports NO episodes of bloody stool over last 2 days. No melena or rectal bleeding documented by nursing staff.      OVERNIGHT EVENTS: NONE    Home Medications:  [place] 1064 mg/3.9 mL intramuscular suspension, extended release: 1 dose(s) intramuscular every 8 weeks (30 Mar 2022 15:58)  carvedilol 6.25 mg oral tablet: 1 tab(s) orally 2 times a day (30 Mar 2022 15:58)  cloNIDine 0.2 mg oral tablet: 1 tab(s) orally 3 times a day (30 Mar 2022 15:58)  diclofenac 1% topical gel: Apply topically to affected area 3 times a day (30 Mar 2022 15:58)  doxazosin 4 mg oral tablet: 1 tab(s) orally 2 times a day (30 Mar 2022 15:58)  furosemide 20 mg oral tablet: 3 tab(s) orally 2 times a day (30 Mar 2022 15:58)  lactobacillus acidophilus oral capsule: 1 cap(s) orally 2 times a day (30 Mar 2022 15:58)  lamoTRIgine 100 mg oral tablet: 1 tab(s) orally once a day (30 Mar 2022 15:58)  oxybutynin 15 mg/24 hr oral tablet, extended release: 1 tab(s) orally once a day (30 Mar 2022 15:58)  Vitamin B-12 1000 mcg oral tablet: 1 tab(s) orally once a day (30 Mar 2022 15:58)      MEDICATIONS  (STANDING):  amLODIPine   Tablet 10 milliGRAM(s) Oral daily  atorvastatin 10 milliGRAM(s) Oral at bedtime  budesonide 160 MICROgram(s)/formoterol 4.5 MICROgram(s) Inhaler 2 Puff(s) Inhalation two times a day  carvedilol 6.25 milliGRAM(s) Oral every 12 hours  cloNIDine 0.2 milliGRAM(s) Oral three times a day  cyanocobalamin 1000 MICROGram(s) Oral daily  dextrose 5%. 1000 milliLiter(s) (50 mL/Hr) IV Continuous <Continuous>  dextrose 5%. 1000 milliLiter(s) (100 mL/Hr) IV Continuous <Continuous>  dextrose 50% Injectable 25 Gram(s) IV Push once  dextrose 50% Injectable 12.5 Gram(s) IV Push once  dextrose 50% Injectable 25 Gram(s) IV Push once  doxazosin 4 milliGRAM(s) Oral <User Schedule>  folic acid 1 milliGRAM(s) Oral daily  furosemide    Tablet 60 milliGRAM(s) Oral two times a day  glucagon  Injectable 1 milliGRAM(s) IntraMuscular once  hydrALAZINE 100 milliGRAM(s) Oral three times a day  insulin lispro (ADMELOG) corrective regimen sliding scale   SubCutaneous three times a day before meals  insulin lispro (ADMELOG) corrective regimen sliding scale   SubCutaneous at bedtime  iron sucrose Injectable 100 milliGRAM(s) IV Push every 24 hours  isosorbide   mononitrate ER Tablet (IMDUR) 90 milliGRAM(s) Oral daily  lamoTRIgine 100 milliGRAM(s) Oral daily  oxybutynin XL 15 milliGRAM(s) Oral daily  pantoprazole  Injectable 40 milliGRAM(s) IV Push daily  polyethylene glycol 3350 17 Gram(s) Oral daily  potassium chloride    Tablet ER 20 milliEquivalent(s) Oral daily  senna 2 Tablet(s) Oral at bedtime  venlafaxine XR. 150 milliGRAM(s) Oral daily    MEDICATIONS  (PRN):  acetaminophen     Tablet .. 650 milliGRAM(s) Oral every 6 hours PRN Temp greater or equal to 38C (100.4F), Mild Pain (1 - 3)  aluminum hydroxide/magnesium hydroxide/simethicone Suspension 30 milliLiter(s) Oral every 4 hours PRN Dyspepsia  dextrose Oral Gel 15 Gram(s) Oral once PRN Blood Glucose LESS THAN 70 milliGRAM(s)/deciliter  melatonin 3 milliGRAM(s) Oral at bedtime PRN Insomnia  ondansetron Injectable 4 milliGRAM(s) IV Push every 8 hours PRN Nausea and/or Vomiting      Allergies    No Known Allergies    Intolerances        Social History:  Lives in [facility]   ADL independent   Ambulates w/ a walker   Former smoker, quit 45 years ago, ~8 pack years   Alcohol denies   Drugs denies   COVID Moderna x2 (30 Mar 2022 15:40)      REVIEW OF SYSTEMS: i am OK   CONSTITUTIONAL: No fever, No chills, No fatigue, No myalgia, No Body ache, No Weakness No headache  EYES: No eye pain,  No visual disturbances, No discharge, NO Redness  ENMT:  No ear pain, No nose bleed, No vertigo; No sinus pain, NO throat pain, No Congestion  NECK: No pain, No stiffness  RESPIRATORY: No cough, NO wheezing, No  hemoptysis, NO  shortness of breath  CARDIOVASCULAR: No chest pain, palpitations  GASTROINTESTINAL: [x] abdominal pain, NO epigastric pain. No nausea, No vomiting; No diarrhea, No constipation. [ x ]  BM, + rectal bleeding  GENITOURINARY: No dysuria, No frequency, No urgency, No hematuria, NO incontinence  NEUROLOGICAL: No headaches, No dizziness, No numbness, No tingling, No tremors, No weakness  EXT: No Swelling, No Pain, No Edema  SKIN:  [ x ] No itching, burning, rashes, or lesions   MUSCULOSKELETAL: No joint pain ,No Jt swelling; No muscle pain, No back pain, No extremity pain  PSYCHIATRIC: No depression,  No anxiety,  No mood swings ,No difficulty sleeping at night  PAIN SCALE: [ x ] None  [ x ] Other- 3/10 abd pain intermittent  ROS Unable to obtain due to - [  ] Dementia  [  ] Lethargy [  ] Drowsy [  ] Sedated [  ] non verbal  REST OF REVIEW Of SYSTEM - [ x ] Normal     Vital Signs Last 24 Hrs  T(C): 36.8 (04 Apr 2022 04:17), Max: 37 (03 Apr 2022 11:20)  T(F): 98.3 (04 Apr 2022 04:17), Max: 98.6 (03 Apr 2022 11:20)  HR: 56 (04 Apr 2022 08:00) (56 - 72)  BP: 144/82 (04 Apr 2022 08:00) (133/73 - 174/85)  BP(mean): --  RR: 18 (04 Apr 2022 04:17) (18 - 18)  SpO2: 95% (04 Apr 2022 04:17) (94% - 96%)  Finger Stick          PHYSICAL EXAM:  GENERAL:  [ x ] NAD , [ x ] well appearing, [  ] Agitated, [  ] Drowsy,  [  ] Lethargy, [  ] confused   HEAD:  [ x ] Normal, [  ] Other  EYES:  [ x ] EOMI, [ x ] PERRLA, [ x ] conjunctiva and sclera clear normal, [  ] Other,  [ x ] Pallor,[  ] Discharge  ENMT:  [ x ] Normal, [ x ] Moist mucous membranes, [ x ] Good dentition, [ x ] No Thrush  NECK:  [ x ] Supple, [ x ] No JVD, [ x ] Normal thyroid, [  ] Lymphadenopathy [  ] Other  CHEST/LUNG:  [ x ] Clear to auscultation bilaterally, [ x ] Breath Sounds equal B/L, [  ] poor effort  [ x ] No rales, [ x ] No rhonchi  [ x ]  No wheezing,   HEART:  [ x ] Regular rate and rhythm, [  ] tachycardia, [  ] Bradycardia,  [  ] irregular  [ x ] systolic murmur, No rubs, No gallops, [  ] PPM in place (Mfr:  )  ABDOMEN:  [ x ] Soft, [ x ] Nontender, [ x ] Nondistended, [ x ] No mass, [ x ] Bowel sounds present, [ x ] obese  NERVOUS SYSTEM:  [ x ] Alert & Oriented x3, [ x ] Nonfocal  [  ] Confusion  [  ] Encephalopathic [  ] Sedated [  ] Unable to assess, [  ] Dementia [  ] Other-  EXTREMITIES: [  ] 2+ Peripheral Pulses, No clubbing, No cyanosis,  [ x ] No edema B/L lower EXT. [  ] PVD stasis skin changes B/L Lower EXT, [  ] wound  LYMPH: No lymphadenopathy noted  SKIN:  [ x ] No rashes or lesions, [  ] Pressure Ulcers, [  ] ecchymosis, [  ] Skin Tears, [  ] Other      DIET: Diet, Consistent Carbohydrate w/Evening Snack:   DASH/TLC Sodium & Cholesterol Restricted (04-03-22 @ 16:10)      LABS:                        8.4    6.79  )-----------( 196      ( 04 Apr 2022 08:27 )             25.4     04 Apr 2022 08:27    142    |  108    |  23     ----------------------------<  117    3.8     |  28     |  2.00     Ca    8.8        04 Apr 2022 08:27  Phos  3.8       04 Apr 2022 08:27  Mg     2.2       04 Apr 2022 08:27    TPro  5.6    /  Alb  2.8    /  TBili  0.2    /  DBili  x      /  AST  13     /  ALT  16     /  AlkPhos  52     04 Apr 2022 08:27        Anemia Panel:  Iron Total, Serum: 33 ug/dL (03-31-22 @ 11:49)  Iron - Total Binding Capacity.: 230 ug/dL (03-31-22 @ 11:49)  Ferritin, Serum: 40 ng/mL (03-31-22 @ 11:49)  Folate, Serum: >20.0 ng/mL (03-31-22 @ 11:49)  Vitamin B12, Serum: 1442 pg/mL (03-31-22 @ 11:49)      Thyroid Panel:  Thyroid Stimulating Hormone, Serum: 1.19 uIU/mL (03-31-22 @ 08:09)    RADIOLOGY & ADDITIONAL TESTS:  < from: NM GI Bleed Localization (NM GI Bleed Localization .) (04.04.22 @ 18:07) >    ACC: [id number] EXAM:  NM GI BLEEDING IMG                          PROCEDURE DATE:  04/04/2022          INTERPRETATION:  RADIOPHARMACEUTICAL: 19.2 mCi Tc-99m labeled autologous   red blood cells, I.V.    CLINICAL STATEMENT: 77-year-old male with gastrointestinal bleeding   referred to localize bleeding site.    TECHNIQUE:  Dynamic images of the anterior abdomen/pelvis were obtained   for 2 hours following administration of radiopharmaceutical. Static   images of the abdomen/pelvis images were obtained immediately thereafter.   Anterior images of the neck/chest were obtained for    purposes.    FINDINGS: Immediately after the administration of the labeled RBC, there   is extravasation of activity in the left abdominal region which moves in   a serpiginous pattern compatible with small bowel bleed, probably jejunum.    IMPRESSION: Abnormal GI bleeding scan.    I is compatible with small bowel bleed, probably jejunal.    < end of copied text >      HEALTH ISSUES - PROBLEM Dx:  GIB (gastrointestinal bleeding)    Anxiety and depression    Type 2 diabetes mellitus    HTN (hypertension)    HLD (hyperlipidemia)    DVT prophylaxis    Anemia    CAD (coronary artery disease)    Atrial fibrillation    Chronic CHF    Stage 3 chronic kidney disease    Abnormal finding on lung imaging            Consultant(s) Notes Reviewed:  [ x ] YES     Care Discussed with [X] Consultants  [ x ] Patient  [  ] Family [  ] HCP [  ]   [  ] Social Service  [  ] RN, [  ] Physical Therapy,[  ] Palliative care team  DVT PPX: [  ] Lovenox, [  ] S C Heparin, [  ] Coumadin, [  ] Xarelto, [  ] Eliquis, [  ] Pradaxa, [  ] IV Heparin drip, [ x ] SCD [  ] Contraindication 2 to GI Bleed,[  ] Ambulation [  ] Contraindicated 2 to  bleed [  ] Contraindicated 2 to Brain Bleed  Advanced directive: [ x ] None, [  ] DNR/DNI

## 2022-04-04 NOTE — PROGRESS NOTE ADULT - ATTENDING COMMENTS
78 yo M PMH of A fib (on Eliquis) , MGUS s/p PRBC transfusion 10/21, anxiety/depression, CAD s/p stents (not on plavix), BPH, CHF, Diverticulosis, HLD, HTN, thyroid nodules, CKD stage 3, DM2 presents to the ED from [facility] after having up to 3 bowel movements  admitted for GIB/rectal bleeding.   Pt seen, Examined, case & care plan d/w pt, residents at detail.  D/W Cardiology-Dr Sheridan - HOLD ASA, Hold AC   D/W DR Chavez today -OK to advance regular diet   D/W DR Rodriguez--H/H Stable, Restart AC as per cardio/ GI  AM Labs   SCD's for DVT ppx  PO diet .   Total care time is 40 minutes.
76 yo M PMH of A fib (on Eliquis) , MGUS s/p PRBC transfusion 10/21, anxiety/depression, CAD s/p stents (not on plavix), BPH, CHF, Diverticulosis, HLD, HTN, thyroid nodules, CKD stage 3, DM2 presents to the ED from [facility] after having up to 3 bowel movements  admitted for GIB/rectal bleeding.   Pt seen, Examined, case & care plan d/w pt, residents at detail.  D/W Cardiology-Dr Sheridan - HOLD ASA, Hold AC   D/W DR Bah-Full  liquid diet, HOLD AC/AP, Bleeding scan if bleeding restart, surgery Eval,  AM Labs   SCD's for DVT ppx  PO diet .   Total care time is 40 minutes.
76 yo M PMH of A fib (on Eliquis) , MGUS s/p PRBC transfusion 10/21, anxiety/depression, CAD s/p stents (not on plavix), BPH, CHF, Diverticulosis, HLD, HTN, thyroid nodules, CKD stage 3, DM2 presents to the ED from [facility] after having up to 3 bowel movements  admitted for GIB/rectal bleeding.   Pt seen, Examined, case & care plan d/w pt, residents at detail.  D/W Cardiology-Dr Posey- HOLD ASA, Hold AC   D/W DR Bah-Colonoscopy on Friday, Clear liquid diet, HOLD AC/AP  AM Labs   -NPO after midnight   SCD's for DVT ppx  PO diet .   Pt is medically optimized for schedule Colonoscopy tomorrow.
78 yo M PMH of A fib (on Eliquis) , MGUS s/p PRBC transfusion 10/21, anxiety/depression, CAD s/p stents (not on plavix), BPH, CHF, Diverticulosis, HLD, HTN, thyroid nodules, CKD stage 3, DM2 presents to the ED from [facility] after having up to 3 bowel movements  admitted for GIB/rectal bleeding.   Pt seen, Examined, case & care plan d/w pt, residents at detail.  D/W Cardiology-Dr Posey- HOLD ASA, Hold AC   D/W DR Bah-, Clear liquid diet, HOLD AC/AP, Bleeding scan if bleeding restart, surgery Eval,  AM Labs   SCD's for DVT ppx  PO diet .   Total care time is 45 minutes.
76 yo M PMH of A fib (on Eliquis) , MGUS s/p PRBC transfusion 10/21, anxiety/depression, CAD s/p stents (not on plavix), BPH, CHF, Diverticulosis, HLD, HTN, thyroid nodules, CKD stage 3, DM2 presents to the ED from [facility] after having up to 3 bowel movements  admitted for GIB/rectal bleeding.   Pt seen, Examined, case & care plan d/w pt, residents at detail.  D/W Cardiology-Dr Monterroso - HOLD ASA, Hold AC   D/W DR Chavez  & Dr Crockett  -with abnormal Bleeding scan + active bleed in small Bowel, Transfer to [facility] for further care   AM Labs   SCD's for DVT ppx  PO diet .---> NPO for transfer, Tele Monitor  , D/W Pt at detail agrees for Transfer,   -Transfer initiated VIA [facility] transfer center, Case D/W GI, Cardiology & DR GREYSON Leonardo at detail, D/W [facility] ER MD as well   Total care time is 60  minutes.

## 2022-04-04 NOTE — CONSULT NOTE ADULT - PROBLEM SELECTOR RECOMMENDATION 9
- No emergent surgical intervention warranted at this time  - Serial CBCs, transfuse PRN  - Recommend transfer to tertiary facility for IR embolization and higher level of care  - Discussed with Dr. King

## 2022-04-04 NOTE — ED ADULT NURSE NOTE - OBJECTIVE STATEMENT
76 y/o male brought in by EMS as transfer from [facility], complaining of GI Bleed. as per ems, pt pending consult from interventional GI/IR after pt had one episode of birght red bloody bowel movements with positive nuclear rbc scan showing ?jejunal bleed. Pt had recent admission to [facility] after GI bleed plavix stopped requiring multiple transfusions. pt restarted ASA today and bloody BM started at 1500. pt had one unit of PRBC infusing at 100cc/hr in transfer to [facility] ED. Pt denies any chest pain, sob, lightheadedness, palpitations, headache, dizziness, n/v/d. pt able to speak in full complete sentences, breathing spontaenous and unlabored. abd soft nontender. skin warm and pale. pt placed on cardiac monitor showing NSR.  Safety and comfort measures provided, bed locked and in lowest position, side rails up for safety. Call bell within reach. Awaiting results.

## 2022-04-04 NOTE — ED PROVIDER NOTE - OBJECTIVE STATEMENT
77M PMH Afib on eliquis, CAD s/p stent on ASA, presented initially to [facility] 1 week ago for bloody bowel mvmts, intially stopped Eliquis, had Colonoscopy which wasn't   76 yo M PMH of A fib (on Eliquis) , MGUS s/p PRBC transfusion 10/21, anxiety/depression, CAD s/p stents (not on plavix), BPH, CHF, Diverticulosis, HLD, HTN, thyroid nodules, CKD stage 3, DM2 presents as transfer from [facility] for interventional GI/IR after pt with bloody bowel movements with positive nuclear rbc scan showing ?jejunal bleed. Pt intially presented to [facility] 1 week ago with 3 dark bowel movements, eliquis stopped, colonoscopy done which was equivocal, aspirin restarted, had bloody bm at 3pm today, hb dropped from 8.7 to 8.4,      to the ED from [facility] after having up to 3 bowel movements with dark/black stool. Patient would have another episode of dark stool in the ED. Patient denied consuming any beets or magnesium/calcium supplements recently. Patient states that onset of stool was very sudden. Patient recently admitted to [facility] in January for CHF exacerbation, improved w/ diuresis. Patient was seen and examined at bedside, denied any headache, cp/pressure, palpitations, myalgias, fevers/chills, SOB, hematuria, dysuria. Patient endorses lightheadedness/dizziness, 4/10 non-radiating sharp periumbilical pain, and chronic knee pain. 78 yo M PMH of A fib (on Eliquis) , MGUS s/p PRBC transfusion 10/21, anxiety/depression, CAD s/p stents (not on plavix), BPH, CHF, Diverticulosis, HLD, HTN, thyroid nodules, CKD stage 3, DM2 presents as transfer from [facility] for interventional GI/IR after pt with bloody bowel movements with positive nuclear rbc scan showing ?jejunal bleed. Pt intially presented to [facility] 1 week ago with 3 dark bowel movements, eliquis stopped, colonoscopy done which was equivocal, aspirin restarted, had bloody bm at 3pm today, hb dropped from 8.7 to 8.4, pt received 1 unit prbc today/upon arrival. Vitals reassuring throughout the day. Pt denies any chest pain, sob, lightheadedness, palpitations. Denies any abd pain currently though states he had some lower abd pain earlier today.

## 2022-04-04 NOTE — ED ADULT NURSE NOTE - NSIMPLEMENTINTERV_GEN_ALL_ED
Implemented All Fall with Harm Risk Interventions:  [place] to call system. Call bell, personal items and telephone within reach. Instruct patient to call for assistance. Room bathroom lighting operational. Non-slip footwear when patient is off stretcher. Physically safe environment: no spills, clutter or unnecessary equipment. Stretcher in lowest position, wheels locked, appropriate side rails in place. Provide visual cue, wrist band, yellow gown, etc. Monitor gait and stability. Monitor for mental status changes and reorient to person, place, and time. Review medications for side effects contributing to fall risk. Reinforce activity limits and safety measures with patient and family. Provide visual clues: red socks.

## 2022-04-04 NOTE — PROGRESS NOTE ADULT - REASON FOR ADMISSION
Rectal bleed

## 2022-04-04 NOTE — PROGRESS NOTE ADULT - PROBLEM SELECTOR PROBLEM 3
Atrial fibrillation
Atrial fibrillation
Stage 3 chronic kidney disease
Atrial fibrillation

## 2022-04-04 NOTE — PROGRESS NOTE ADULT - PROBLEM SELECTOR PLAN 8
Chronic stable   -hold home metformin   - LDSS   - clear liquid diet   - hypoglycemia protocol

## 2022-04-05 DIAGNOSIS — F41.9 ANXIETY DISORDER, UNSPECIFIED: ICD-10-CM

## 2022-04-05 DIAGNOSIS — Z29.9 ENCOUNTER FOR PROPHYLACTIC MEASURES, UNSPECIFIED: ICD-10-CM

## 2022-04-05 DIAGNOSIS — I10 ESSENTIAL (PRIMARY) HYPERTENSION: ICD-10-CM

## 2022-04-05 DIAGNOSIS — E78.5 HYPERLIPIDEMIA, UNSPECIFIED: ICD-10-CM

## 2022-04-05 DIAGNOSIS — N18.30 CHRONIC KIDNEY DISEASE, STAGE 3 UNSPECIFIED: ICD-10-CM

## 2022-04-05 DIAGNOSIS — E11.9 TYPE 2 DIABETES MELLITUS WITHOUT COMPLICATIONS: ICD-10-CM

## 2022-04-05 DIAGNOSIS — K92.2 GASTROINTESTINAL HEMORRHAGE, UNSPECIFIED: ICD-10-CM

## 2022-04-05 DIAGNOSIS — I48.91 UNSPECIFIED ATRIAL FIBRILLATION: ICD-10-CM

## 2022-04-05 DIAGNOSIS — I25.10 ATHEROSCLEROTIC HEART DISEASE OF NATIVE CORONARY ARTERY WITHOUT ANGINA PECTORIS: ICD-10-CM

## 2022-04-05 LAB
ALBUMIN SERPL ELPH-MCNC: 3.7 G/DL — SIGNIFICANT CHANGE UP (ref 3.3–5)
ALP SERPL-CCNC: 65 U/L — SIGNIFICANT CHANGE UP (ref 40–120)
ALT FLD-CCNC: 8 U/L — LOW (ref 10–45)
ANION GAP SERPL CALC-SCNC: 13 MMOL/L — SIGNIFICANT CHANGE UP (ref 5–17)
AST SERPL-CCNC: 15 U/L — SIGNIFICANT CHANGE UP (ref 10–40)
BILIRUB SERPL-MCNC: 0.2 MG/DL — SIGNIFICANT CHANGE UP (ref 0.2–1.2)
BUN SERPL-MCNC: 22 MG/DL — SIGNIFICANT CHANGE UP (ref 7–23)
CALCIUM SERPL-MCNC: 9.4 MG/DL — SIGNIFICANT CHANGE UP (ref 8.4–10.5)
CHLORIDE SERPL-SCNC: 103 MMOL/L — SIGNIFICANT CHANGE UP (ref 96–108)
CO2 SERPL-SCNC: 24 MMOL/L — SIGNIFICANT CHANGE UP (ref 22–31)
CREAT SERPL-MCNC: 1.96 MG/DL — HIGH (ref 0.5–1.3)
EGFR: 35 ML/MIN/1.73M2 — LOW
GLUCOSE SERPL-MCNC: 111 MG/DL — HIGH (ref 70–99)
HCT VFR BLD CALC: 30.6 % — LOW (ref 39–50)
HCT VFR BLD CALC: 31.1 % — LOW (ref 39–50)
HGB BLD-MCNC: 9.8 G/DL — LOW (ref 13–17)
HGB BLD-MCNC: 9.9 G/DL — LOW (ref 13–17)
MCHC RBC-ENTMCNC: 28.4 PG — SIGNIFICANT CHANGE UP (ref 27–34)
MCHC RBC-ENTMCNC: 28.7 PG — SIGNIFICANT CHANGE UP (ref 27–34)
MCHC RBC-ENTMCNC: 31.8 GM/DL — LOW (ref 32–36)
MCHC RBC-ENTMCNC: 32 GM/DL — SIGNIFICANT CHANGE UP (ref 32–36)
MCV RBC AUTO: 89.4 FL — SIGNIFICANT CHANGE UP (ref 80–100)
MCV RBC AUTO: 89.7 FL — SIGNIFICANT CHANGE UP (ref 80–100)
NRBC # BLD: 0 /100 WBCS — SIGNIFICANT CHANGE UP (ref 0–0)
NRBC # BLD: 0 /100 WBCS — SIGNIFICANT CHANGE UP (ref 0–0)
PLATELET # BLD AUTO: 220 K/UL — SIGNIFICANT CHANGE UP (ref 150–400)
PLATELET # BLD AUTO: 234 K/UL — SIGNIFICANT CHANGE UP (ref 150–400)
POTASSIUM SERPL-MCNC: 3.1 MMOL/L — LOW (ref 3.5–5.3)
POTASSIUM SERPL-SCNC: 3.1 MMOL/L — LOW (ref 3.5–5.3)
PROT SERPL-MCNC: 6 G/DL — SIGNIFICANT CHANGE UP (ref 6–8.3)
RBC # BLD: 3.41 M/UL — LOW (ref 4.2–5.8)
RBC # BLD: 3.48 M/UL — LOW (ref 4.2–5.8)
RBC # FLD: 16.4 % — HIGH (ref 10.3–14.5)
RBC # FLD: 16.6 % — HIGH (ref 10.3–14.5)
SARS-COV-2 RNA SPEC QL NAA+PROBE: SIGNIFICANT CHANGE UP
SARS-COV-2 RNA SPEC QL NAA+PROBE: SIGNIFICANT CHANGE UP
SODIUM SERPL-SCNC: 140 MMOL/L — SIGNIFICANT CHANGE UP (ref 135–145)
WBC # BLD: 5.45 K/UL — SIGNIFICANT CHANGE UP (ref 3.8–10.5)
WBC # BLD: 6.06 K/UL — SIGNIFICANT CHANGE UP (ref 3.8–10.5)
WBC # FLD AUTO: 5.45 K/UL — SIGNIFICANT CHANGE UP (ref 3.8–10.5)
WBC # FLD AUTO: 6.06 K/UL — SIGNIFICANT CHANGE UP (ref 3.8–10.5)

## 2022-04-05 PROCEDURE — 99223 1ST HOSP IP/OBS HIGH 75: CPT

## 2022-04-05 RX ORDER — OXYBUTYNIN CHLORIDE 5 MG
15 TABLET ORAL DAILY
Refills: 0 | Status: DISCONTINUED | OUTPATIENT
Start: 2022-04-05 | End: 2022-04-05

## 2022-04-05 RX ORDER — ISOSORBIDE MONONITRATE 60 MG/1
90 TABLET, EXTENDED RELEASE ORAL DAILY
Refills: 0 | Status: DISCONTINUED | OUTPATIENT
Start: 2022-04-05 | End: 2022-04-11

## 2022-04-05 RX ORDER — OXYBUTYNIN CHLORIDE 5 MG
5 TABLET ORAL THREE TIMES A DAY
Refills: 0 | Status: DISCONTINUED | OUTPATIENT
Start: 2022-04-05 | End: 2022-04-11

## 2022-04-05 RX ORDER — PREGABALIN 225 MG/1
1000 CAPSULE ORAL DAILY
Refills: 0 | Status: DISCONTINUED | OUTPATIENT
Start: 2022-04-05 | End: 2022-04-11

## 2022-04-05 RX ORDER — DOXAZOSIN MESYLATE 4 MG
4 TABLET ORAL AT BEDTIME
Refills: 0 | Status: DISCONTINUED | OUTPATIENT
Start: 2022-04-05 | End: 2022-04-11

## 2022-04-05 RX ORDER — ATORVASTATIN CALCIUM 80 MG/1
10 TABLET, FILM COATED ORAL AT BEDTIME
Refills: 0 | Status: DISCONTINUED | OUTPATIENT
Start: 2022-04-05 | End: 2022-04-11

## 2022-04-05 RX ORDER — BUDESONIDE AND FORMOTEROL FUMARATE DIHYDRATE 160; 4.5 UG/1; UG/1
2 AEROSOL RESPIRATORY (INHALATION)
Refills: 0 | Status: DISCONTINUED | OUTPATIENT
Start: 2022-04-05 | End: 2022-04-11

## 2022-04-05 RX ORDER — CARVEDILOL PHOSPHATE 80 MG/1
6.25 CAPSULE, EXTENDED RELEASE ORAL EVERY 12 HOURS
Refills: 0 | Status: DISCONTINUED | OUTPATIENT
Start: 2022-04-05 | End: 2022-04-08

## 2022-04-05 RX ORDER — FUROSEMIDE 40 MG
60 TABLET ORAL DAILY
Refills: 0 | Status: DISCONTINUED | OUTPATIENT
Start: 2022-04-05 | End: 2022-04-07

## 2022-04-05 RX ORDER — FOLIC ACID 0.8 MG
1 TABLET ORAL DAILY
Refills: 0 | Status: DISCONTINUED | OUTPATIENT
Start: 2022-04-05 | End: 2022-04-11

## 2022-04-05 RX ORDER — AMLODIPINE BESYLATE 2.5 MG/1
10 TABLET ORAL DAILY
Refills: 0 | Status: DISCONTINUED | OUTPATIENT
Start: 2022-04-05 | End: 2022-04-11

## 2022-04-05 RX ORDER — PANTOPRAZOLE SODIUM 20 MG/1
40 TABLET, DELAYED RELEASE ORAL
Refills: 0 | Status: DISCONTINUED | OUTPATIENT
Start: 2022-04-05 | End: 2022-04-11

## 2022-04-05 RX ORDER — LAMOTRIGINE 25 MG/1
100 TABLET, ORALLY DISINTEGRATING ORAL DAILY
Refills: 0 | Status: DISCONTINUED | OUTPATIENT
Start: 2022-04-05 | End: 2022-04-11

## 2022-04-05 RX ORDER — VENLAFAXINE HCL 75 MG
150 CAPSULE, EXT RELEASE 24 HR ORAL DAILY
Refills: 0 | Status: DISCONTINUED | OUTPATIENT
Start: 2022-04-05 | End: 2022-04-11

## 2022-04-05 RX ORDER — ONDANSETRON 8 MG/1
4 TABLET, FILM COATED ORAL EVERY 8 HOURS
Refills: 0 | Status: DISCONTINUED | OUTPATIENT
Start: 2022-04-05 | End: 2022-04-11

## 2022-04-05 RX ORDER — ACETAMINOPHEN 500 MG
650 TABLET ORAL EVERY 6 HOURS
Refills: 0 | Status: DISCONTINUED | OUTPATIENT
Start: 2022-04-05 | End: 2022-04-11

## 2022-04-05 RX ORDER — METOCLOPRAMIDE HCL 10 MG
10 TABLET ORAL THREE TIMES A DAY
Refills: 0 | Status: COMPLETED | OUTPATIENT
Start: 2022-04-05 | End: 2022-04-07

## 2022-04-05 RX ORDER — HYDRALAZINE HCL 50 MG
100 TABLET ORAL THREE TIMES A DAY
Refills: 0 | Status: DISCONTINUED | OUTPATIENT
Start: 2022-04-05 | End: 2022-04-11

## 2022-04-05 RX ORDER — POTASSIUM CHLORIDE 20 MEQ
10 PACKET (EA) ORAL
Refills: 0 | Status: COMPLETED | OUTPATIENT
Start: 2022-04-05 | End: 2022-04-05

## 2022-04-05 RX ADMIN — Medication 5 MILLIGRAM(S): at 13:40

## 2022-04-05 RX ADMIN — Medication 100 MILLIEQUIVALENT(S): at 11:24

## 2022-04-05 RX ADMIN — Medication 10 MILLIGRAM(S): at 14:00

## 2022-04-05 RX ADMIN — Medication 60 MILLIGRAM(S): at 10:21

## 2022-04-05 RX ADMIN — AMLODIPINE BESYLATE 10 MILLIGRAM(S): 2.5 TABLET ORAL at 10:21

## 2022-04-05 RX ADMIN — Medication 0.2 MILLIGRAM(S): at 17:18

## 2022-04-05 RX ADMIN — Medication 100 MILLIGRAM(S): at 13:39

## 2022-04-05 RX ADMIN — Medication 100 MILLIGRAM(S): at 22:06

## 2022-04-05 RX ADMIN — BUDESONIDE AND FORMOTEROL FUMARATE DIHYDRATE 2 PUFF(S): 160; 4.5 AEROSOL RESPIRATORY (INHALATION) at 17:19

## 2022-04-05 RX ADMIN — PANTOPRAZOLE SODIUM 40 MILLIGRAM(S): 20 TABLET, DELAYED RELEASE ORAL at 17:19

## 2022-04-05 RX ADMIN — ATORVASTATIN CALCIUM 10 MILLIGRAM(S): 80 TABLET, FILM COATED ORAL at 22:06

## 2022-04-05 RX ADMIN — Medication 100 MILLIEQUIVALENT(S): at 10:21

## 2022-04-05 RX ADMIN — LAMOTRIGINE 100 MILLIGRAM(S): 25 TABLET, ORALLY DISINTEGRATING ORAL at 12:12

## 2022-04-05 RX ADMIN — Medication 10 MILLIGRAM(S): at 22:05

## 2022-04-05 RX ADMIN — CARVEDILOL PHOSPHATE 6.25 MILLIGRAM(S): 80 CAPSULE, EXTENDED RELEASE ORAL at 17:18

## 2022-04-05 RX ADMIN — Medication 5 MILLIGRAM(S): at 22:06

## 2022-04-05 RX ADMIN — Medication 4 MILLIGRAM(S): at 22:05

## 2022-04-05 RX ADMIN — PREGABALIN 1000 MICROGRAM(S): 225 CAPSULE ORAL at 12:15

## 2022-04-05 RX ADMIN — Medication 100 MILLIEQUIVALENT(S): at 09:30

## 2022-04-05 RX ADMIN — Medication 1 MILLIGRAM(S): at 12:15

## 2022-04-05 RX ADMIN — ISOSORBIDE MONONITRATE 90 MILLIGRAM(S): 60 TABLET, EXTENDED RELEASE ORAL at 12:15

## 2022-04-05 RX ADMIN — Medication 150 MILLIGRAM(S): at 12:12

## 2022-04-05 NOTE — CONSULT NOTE ADULT - ASSESSMENT
77 year old male with past medical history of A fib (on Eliquis), incomplete RBBB CAD s/p stents, CHF, HLD, HTN, CKD stage 3, DM2 GIB s/p PRBC transfusion 10/21, anxiety/depression,  recently admitted  for acute hypoxic respiratory failure 2/2 covid-19 pneumonia 1/3-1/18 then readmitted 1/23 with acute on chronic diastolic chf and readmitted 2/22 for chest pain now presenting with rectal bleeding. Reports two day history of rectal bleeding.    CAD s/p stent/Paroxysmal Afib/GIB  - EKG showed SR with 1st degree AVB incomplete RBBB.  No anginal symptoms  - s/p colonoscopy.  Found to have old blood noted in the left colon, multiple diverticulum from proximal transverse to sigmoid  - now s/p bleeding scan with jejunal bleed, and transferred to  for further management.  - Hb stable this am, and would continue to monitor monitor and transfuse to keep Hgb~8  - holding eliquis  - BP and HR stable, and has been more on the hypertensive side  - restart on home antihypertensives, amlodipine 10, coreg 6.25 bid, clonidine 0.2 bid, hydralazine 100 tid, imdur 90  - cont statin for cad history and remain off asa  - TTE (1/6/ 2022) EF 55%, moderate LVH, mild MR, mild AS, mild TR  - No evidence of volume overload.  Restart lasix at a lower dose, 60 mg po daily.  - Monitor and replete electrolytes. Keep K>4.0 and Mg>2.0.  - All other medical needs as per primary team.  - Other cardiovascular workup will depend on clinical course.  - Will continue to follow.

## 2022-04-05 NOTE — H&P ADULT - NSHPSOCIALHISTORY_GEN_ALL_CORE
Lives in correction   ADL independent   Ambulates w/ a walker   Former smoker, quit 45 years ago, ~8 pack years   Alcohol denies

## 2022-04-05 NOTE — H&P ADULT - PROBLEM SELECTOR PLAN 3
cont atorvastatin, coreg, imdur. off aspirin due to bleed  cardiology recs appreciated    chronic diastolic heart failure  -cont lasix at lower dose, 60mg qd per cardiology  -EF 55%

## 2022-04-05 NOTE — H&P ADULT - PROBLEM SELECTOR PLAN 1
-consult GI  -cbc q12, goal hgb >8  -s/p 4-5U PRBC at other hospital  -s/p colonoscopy 3/31 - poor prep, old blood seen in colon, as well as diverticulosis  -NM scan 4/4 showed possible jejunal bleed  -cont protonix 40mg IV BID

## 2022-04-05 NOTE — H&P ADULT - PROBLEM SELECTOR PLAN 8
cont home antihypertensives, amlodipine 10, coreg 6.25 bid, clonidine 0.2 bid, hydralazine 100 tid, imdur 90

## 2022-04-05 NOTE — PHYSICAL THERAPY INITIAL EVALUATION ADULT - PRECAUTIONS/LIMITATIONS, REHAB EVAL
had NM scan which showed a possible jejunal bleed, and was transferred to [facility]. Patient's last bloody BM was yesterday. Denies dizziness, chest pain, shortness of breath. Having intermittent LLQ pain./fall precautions

## 2022-04-05 NOTE — CONSULT NOTE ADULT - SUBJECTIVE AND OBJECTIVE BOX
[facility] - Fifi Bliss, Bonifacio, Kalpesh, Fatimah, Loc Rosario  Office Number: [phone]    Initial Consult Note    CHIEF COMPLAINT: Patient is a 77y old  Male who presents with a chief complaint of gi bleed    HPI:    76 yo M PMH of A fib (on Eliquis) , MGUS s/p PRBC transfusion 10/21, anxiety/depression, CAD s/p stents (not on plavix), BPH, CHF, Diverticulosis, HLD, HTN, thyroid nodules, CKD stage 3, DM2 presents as transfer from [facility] for interventional GI/IR after pt with bloody bowel movements with positive nuclear rbc scan showing ?jejunal bleed. Pt intially presented to [place] 1 week ago with 3 dark bowel movements, eliquis stopped, colonoscopy done which was equivocal, aspirin restarted, had bloody bm at 3pm today, hb dropped from 8.7 to 8.4, pt received 1 unit prbc today/upon arrival. Vitals reassuring throughout the day. Pt denies any chest pain, sob, lightheadedness, palpitations. Denies any abd pain currently though states he had some lower abd pain earlier today.    PAST MEDICAL & SURGICAL HISTORY:  HTN (hypertension)  c/b multiple episodes of hypertensive urgency    HLD (hyperlipidemia)    Atrial fibrillation  first documented on EKG 10/7/2021    CAD (coronary artery disease)  s/p stents (not on plavix)    Type 2 diabetes mellitus  not on home insulin/ Meds    Anxiety  Depression  multiple psych medications    History of diverticulitis  07/2021    Diverticulosis  c/b GIB in 2020    Afib  on AC    Stage 3 chronic kidney disease    Anemia of chronic disease  Monoclonal Gammopathy-MGUS  pRBC transfusion 10/15/21    Chronic diastolic congestive heart failure    Multiple thyroid nodules    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  Had surgery ( April 2013 )    S/P tonsillectomy    S/P arthroscopic knee surgery  Bilateral ( 2005 )    Torsion of testicle  Had surgery at age 14    Pilonidal cyst  Had surgery ( 1969 )    S/P cataract surgery  Bilateral    H/O hernia repair        SOCIAL HISTORY:  No tobacco, ethanol, or drug abuse.    FAMILY HISTORY:  FHx: throat cancer    No family history of colorectal cancer      No family history of acute MI or sudden cardiac death.    MEDICATIONS  (STANDING):    MEDICATIONS  (PRN):      Allergies    No Known Allergies    Intolerances        REVIEW OF SYSTEMS:    CONSTITUTIONAL: reports weakness, no fevers or chills  EYES/ENT: No visual changes;  No vertigo or throat pain   NECK: No pain or stiffness  RESPIRATORY: No cough, wheezing, hemoptysis; reports shortness of breath  CARDIOVASCULAR: No chest pain or palpitations  GASTROINTESTINAL: No abdominal pain. No nausea, vomiting, or hematemesis; No diarrhea or constipation. No melena or hematochezia.  GENITOURINARY: No dysuria, frequency or hematuria  NEUROLOGICAL: No numbness or weakness  SKIN: No itching or rash  All other review of systems is negative unless indicated above    VITAL SIGNS:   Vital Signs Last 24 Hrs  T(C): 36.4 (05 Apr 2022 08:15), Max: 36.8 (04 Apr 2022 22:25)  T(F): 97.5 (05 Apr 2022 08:15), Max: 98.3 (04 Apr 2022 22:25)  HR: 64 (05 Apr 2022 08:15) (57 - 69)  BP: 154/82 (05 Apr 2022 08:15) (117/66 - 199/91)  BP(mean): 102 (04 Apr 2022 22:50) (102 - 102)  RR: 18 (05 Apr 2022 08:15) (17 - 20)  SpO2: 97% (05 Apr 2022 08:15) (92% - 98%)    I&O's Summary      On Exam:    Constitutional: NAD, alert and oriented x 3  Lungs:  Non-labored, breath sounds are clear bilaterally, No wheezing, rales or rhonchi  Cardiovascular: RRR.  S1 and S2 positive.  No murmurs, rubs, gallops or clicks  Gastrointestinal: Bowel Sounds present, soft, nontender.   Lymph: No peripheral edema. No cervical lymphadenopathy.  Neurological: Alert, no focal deficits  Skin: No rashes or ulcers   Psych:  Mood & affect appropriate.    LABS: All Labs Reviewed:                        9.8    6.06  )-----------( 220      ( 05 Apr 2022 07:24 )             30.6                         9.4    7.54  )-----------( 199      ( 04 Apr 2022 23:54 )             28.7                         9.2    7.41  )-----------( 218      ( 04 Apr 2022 22:57 )             27.9     05 Apr 2022 07:24    140    |  103    |  22     ----------------------------<  111    3.1     |  24     |  1.96   04 Apr 2022 08:27    142    |  108    |  23     ----------------------------<  117    3.8     |  28     |  2.00   03 Apr 2022 07:59    146    |  111    |  24     ----------------------------<  107    3.4     |  27     |  1.90     Ca    9.4        05 Apr 2022 07:24  Ca    8.8        04 Apr 2022 08:27  Ca    9.2        03 Apr 2022 07:59  Phos  3.8       04 Apr 2022 08:27  Phos  3.8       03 Apr 2022 07:59  Phos  3.4       02 Apr 2022 08:58  Mg     2.2       04 Apr 2022 08:27  Mg     2.3       03 Apr 2022 07:59  Mg     2.0       02 Apr 2022 08:58    TPro  6.0    /  Alb  3.7    /  TBili  0.2    /  DBili  x      /  AST  15     /  ALT  8      /  AlkPhos  65     05 Apr 2022 07:24  TPro  5.6    /  Alb  2.8    /  TBili  0.2    /  DBili  x      /  AST  13     /  ALT  16     /  AlkPhos  52     04 Apr 2022 08:27  TPro  5.6    /  Alb  3.0    /  TBili  0.2    /  DBili  x      /  AST  17     /  ALT  16     /  AlkPhos  52     03 Apr 2022 07:59    PT/INR - ( 04 Apr 2022 23:18 )   PT: 12.6 sec;   INR: 1.09 ratio         PTT - ( 04 Apr 2022 23:18 )  PTT:30.2 sec      Blood Culture:         RADIOLOGY:    EKG: sr, nsst abn

## 2022-04-05 NOTE — H&P ADULT - ASSESSMENT
77M with pmh of atrial fibrillation (eliquis), incomplete RBBB CAD s/p stents, diastolic heart failure, HLD, HTN, CKD stage 3b, DM, GIB s/p PRBC transfusion 10/21, anxiety/depression, presenting with rectal bleeding. Was admitted to [facility], transfused 4-5U PRBC, had a colonoscopy w/poor prep but showed diverticulosis and old blood, had another bleed, had NM scan which showed a possible jejunal bleed, and was transferred to [facility] for further care. Patient's last bloody BM was yesterday. Denies dizziness, chest pain, shortness of breath. Having intermittent LLQ pain.

## 2022-04-05 NOTE — PHYSICAL THERAPY INITIAL EVALUATION ADULT - ADDITIONAL COMMENTS
Pt states he lives in group home in [facility] with HR, was using RW intermittently and was fully independent prior.

## 2022-04-05 NOTE — H&P ADULT - NSHPPHYSICALEXAM_GEN_ALL_CORE
Vital Signs Last 24 Hrs  T(C): 36.8 (05 Apr 2022 08:40), Max: 36.8 (04 Apr 2022 22:25)  T(F): 98.3 (05 Apr 2022 08:40), Max: 98.3 (04 Apr 2022 22:25)  HR: 73 (05 Apr 2022 08:40) (57 - 73)  BP: 153/95 (05 Apr 2022 08:40) (117/66 - 199/91)  BP(mean): 102 (04 Apr 2022 22:50) (102 - 102)  RR: 18 (05 Apr 2022 08:40) (17 - 20)  SpO2: 97% (05 Apr 2022 08:40) (92% - 98%)    CONSTITUTIONAL: Well-groomed, in no apparent distress  EYES: No conjunctival or scleral injection, non-icteric; PERRLA and symmetric  ENMT: No external nasal lesions; no pharyngeal injection or exudates, oral mucosa with moist membranes  NECK: Trachea midline without palpable neck mass; thyroid not enlarged and non-tender  RESPIRATORY: Breathing comfortably; lungs CTA without wheeze/rhonchi/rales  CARDIOVASCULAR: +S1S2, RRR, no M/G/R; pedal pulses full and symmetric; no lower extremity edema  GASTROINTESTINAL: No palpable masses +BS throughout, no rebound/guarding; no hepatosplenomegaly; mild TTP in umbilical area  MUSCULOSKELETAL: no digital clubbing or cyanosis; no paraspinal tenderness; normal strength and tone of extremities  NEUROLOGIC: CN II-XII intact; sensation intact in LEs b/l to light touch  PSYCHIATRIC: A+O x 3; mood and affect appropriate; appropriate insight and judgment

## 2022-04-05 NOTE — H&P ADULT - PROBLEM SELECTOR PLAN 9
hold chemical dvt ppx due to GI bleed, give SCDs  NPO for now, when able to resume will give dash diet

## 2022-04-05 NOTE — H&P ADULT - NSHPREVIEWOFSYSTEMS_GEN_ALL_CORE
REVIEW OF SYSTEMS:    CONSTITUTIONAL: No weakness, weight loss, fevers or chills  EYES/ENT: No visual changes;  No vertigo or throat pain   NECK: No pain or stiffness  RESPIRATORY: No cough, wheezing, hemoptysis; No shortness of breath  CARDIOVASCULAR: No chest pain or palpitations, VALDEZ  GASTROINTESTINAL: No abdominal or epigastric pain. No nausea, vomiting, or hematemesis; No diarrhea or constipation. +bloody BMs.  GENITOURINARY: No dysuria, frequency or hematuria  NEUROLOGICAL: No numbness or weakness, AAOX3  SKIN: No itching, rashes  MUSCULOSKELETAL: no joint erythema, no joint swelling  PSYCHIATRIC: no depression, no anxiety

## 2022-04-05 NOTE — H&P ADULT - HISTORY OF PRESENT ILLNESS
77M with pmh of atrial fibrillation (eliquis), incomplete RBBB CAD s/p stents, diastolic heart failure, HLD, HTN, CKD stage 3b, DM, GIB s/p PRBC transfusion 10/21, anxiety/depression, presenting with rectal bleeding. Was admitted to [facility], transfused 4-5U PRBC, had a colonoscopy w/poor prep but showed diverticulosis and old blood, had another bleed, had NM scan which showed a possible jejunal bleed, and was transferred to [facility] for further care. Patient's last bloody BM was yesterday. Denies dizziness, chest pain, shortness of breath. Having intermittent LLQ pain.

## 2022-04-05 NOTE — PHYSICAL THERAPY INITIAL EVALUATION ADULT - PERTINENT HX OF CURRENT PROBLEM, REHAB EVAL
77M with pmh of atrial fibrillation (eliquis), incomplete RBBB CAD s/p stents, diastolic heart failure, HLD, HTN, CKD stage 3b, DM, GIB s/p PRBC transfusion 10/21, anxiety/depression, presenting with rectal bleeding. At OSH, had a colonoscopy w/poor prep but showed diverticulosis and old blood, had another bleed (cont below)

## 2022-04-05 NOTE — PHYSICAL THERAPY INITIAL EVALUATION ADULT - PLANNED THERAPY INTERVENTIONS, PT EVAL
GOAL: Patient will perform 6 steps independently using least restrictive device to enter home in 2 weeks./balance training/bed mobility training/gait training/transfer training

## 2022-04-06 LAB
ANION GAP SERPL CALC-SCNC: 12 MMOL/L — SIGNIFICANT CHANGE UP (ref 5–17)
BUN SERPL-MCNC: 24 MG/DL — HIGH (ref 7–23)
CALCIUM SERPL-MCNC: 8.8 MG/DL — SIGNIFICANT CHANGE UP (ref 8.4–10.5)
CHLORIDE SERPL-SCNC: 101 MMOL/L — SIGNIFICANT CHANGE UP (ref 96–108)
CO2 SERPL-SCNC: 25 MMOL/L — SIGNIFICANT CHANGE UP (ref 22–31)
CREAT SERPL-MCNC: 2.24 MG/DL — HIGH (ref 0.5–1.3)
EGFR: 30 ML/MIN/1.73M2 — LOW
GLUCOSE SERPL-MCNC: 86 MG/DL — SIGNIFICANT CHANGE UP (ref 70–99)
HCT VFR BLD CALC: 29.8 % — LOW (ref 39–50)
HGB BLD-MCNC: 9.6 G/DL — LOW (ref 13–17)
MAGNESIUM SERPL-MCNC: 2.1 MG/DL — SIGNIFICANT CHANGE UP (ref 1.6–2.6)
MCHC RBC-ENTMCNC: 28.8 PG — SIGNIFICANT CHANGE UP (ref 27–34)
MCHC RBC-ENTMCNC: 32.2 GM/DL — SIGNIFICANT CHANGE UP (ref 32–36)
MCV RBC AUTO: 89.5 FL — SIGNIFICANT CHANGE UP (ref 80–100)
NRBC # BLD: 0 /100 WBCS — SIGNIFICANT CHANGE UP (ref 0–0)
PHOSPHATE SERPL-MCNC: 4.2 MG/DL — SIGNIFICANT CHANGE UP (ref 2.5–4.5)
PLATELET # BLD AUTO: 224 K/UL — SIGNIFICANT CHANGE UP (ref 150–400)
POTASSIUM SERPL-MCNC: 3.3 MMOL/L — LOW (ref 3.5–5.3)
POTASSIUM SERPL-SCNC: 3.3 MMOL/L — LOW (ref 3.5–5.3)
RBC # BLD: 3.33 M/UL — LOW (ref 4.2–5.8)
RBC # FLD: 16.5 % — HIGH (ref 10.3–14.5)
SODIUM SERPL-SCNC: 138 MMOL/L — SIGNIFICANT CHANGE UP (ref 135–145)
WBC # BLD: 6.23 K/UL — SIGNIFICANT CHANGE UP (ref 3.8–10.5)
WBC # FLD AUTO: 6.23 K/UL — SIGNIFICANT CHANGE UP (ref 3.8–10.5)

## 2022-04-06 PROCEDURE — 99232 SBSQ HOSP IP/OBS MODERATE 35: CPT

## 2022-04-06 RX ORDER — POTASSIUM CHLORIDE 20 MEQ
40 PACKET (EA) ORAL ONCE
Refills: 0 | Status: COMPLETED | OUTPATIENT
Start: 2022-04-06 | End: 2022-04-06

## 2022-04-06 RX ADMIN — CARVEDILOL PHOSPHATE 6.25 MILLIGRAM(S): 80 CAPSULE, EXTENDED RELEASE ORAL at 18:01

## 2022-04-06 RX ADMIN — Medication 0.2 MILLIGRAM(S): at 05:49

## 2022-04-06 RX ADMIN — Medication 5 MILLIGRAM(S): at 16:05

## 2022-04-06 RX ADMIN — BUDESONIDE AND FORMOTEROL FUMARATE DIHYDRATE 2 PUFF(S): 160; 4.5 AEROSOL RESPIRATORY (INHALATION) at 18:00

## 2022-04-06 RX ADMIN — Medication 4 MILLIGRAM(S): at 22:00

## 2022-04-06 RX ADMIN — Medication 100 MILLIGRAM(S): at 22:00

## 2022-04-06 RX ADMIN — AMLODIPINE BESYLATE 10 MILLIGRAM(S): 2.5 TABLET ORAL at 05:49

## 2022-04-06 RX ADMIN — Medication 10 MILLIGRAM(S): at 16:05

## 2022-04-06 RX ADMIN — LAMOTRIGINE 100 MILLIGRAM(S): 25 TABLET, ORALLY DISINTEGRATING ORAL at 12:50

## 2022-04-06 RX ADMIN — Medication 5 MILLIGRAM(S): at 05:49

## 2022-04-06 RX ADMIN — Medication 10 MILLIGRAM(S): at 05:49

## 2022-04-06 RX ADMIN — Medication 100 MILLIGRAM(S): at 16:05

## 2022-04-06 RX ADMIN — PANTOPRAZOLE SODIUM 40 MILLIGRAM(S): 20 TABLET, DELAYED RELEASE ORAL at 05:50

## 2022-04-06 RX ADMIN — Medication 1 MILLIGRAM(S): at 12:50

## 2022-04-06 RX ADMIN — Medication 150 MILLIGRAM(S): at 12:50

## 2022-04-06 RX ADMIN — Medication 60 MILLIGRAM(S): at 05:49

## 2022-04-06 RX ADMIN — Medication 0.2 MILLIGRAM(S): at 18:01

## 2022-04-06 RX ADMIN — Medication 650 MILLIGRAM(S): at 16:34

## 2022-04-06 RX ADMIN — PREGABALIN 1000 MICROGRAM(S): 225 CAPSULE ORAL at 12:50

## 2022-04-06 RX ADMIN — Medication 10 MILLIGRAM(S): at 22:00

## 2022-04-06 RX ADMIN — ISOSORBIDE MONONITRATE 90 MILLIGRAM(S): 60 TABLET, EXTENDED RELEASE ORAL at 12:50

## 2022-04-06 RX ADMIN — PANTOPRAZOLE SODIUM 40 MILLIGRAM(S): 20 TABLET, DELAYED RELEASE ORAL at 18:02

## 2022-04-06 RX ADMIN — Medication 650 MILLIGRAM(S): at 16:04

## 2022-04-06 RX ADMIN — CARVEDILOL PHOSPHATE 6.25 MILLIGRAM(S): 80 CAPSULE, EXTENDED RELEASE ORAL at 05:49

## 2022-04-06 RX ADMIN — Medication 40 MILLIEQUIVALENT(S): at 12:51

## 2022-04-06 RX ADMIN — Medication 5 MILLIGRAM(S): at 22:00

## 2022-04-06 RX ADMIN — ATORVASTATIN CALCIUM 10 MILLIGRAM(S): 80 TABLET, FILM COATED ORAL at 22:01

## 2022-04-06 RX ADMIN — Medication 100 MILLIGRAM(S): at 05:49

## 2022-04-07 LAB
ANION GAP SERPL CALC-SCNC: 16 MMOL/L — SIGNIFICANT CHANGE UP (ref 5–17)
BUN SERPL-MCNC: 36 MG/DL — HIGH (ref 7–23)
CALCIUM SERPL-MCNC: 9.3 MG/DL — SIGNIFICANT CHANGE UP (ref 8.4–10.5)
CHLORIDE SERPL-SCNC: 101 MMOL/L — SIGNIFICANT CHANGE UP (ref 96–108)
CO2 SERPL-SCNC: 22 MMOL/L — SIGNIFICANT CHANGE UP (ref 22–31)
CREAT SERPL-MCNC: 2.77 MG/DL — HIGH (ref 0.5–1.3)
EGFR: 23 ML/MIN/1.73M2 — LOW
GLUCOSE SERPL-MCNC: 111 MG/DL — HIGH (ref 70–99)
HCT VFR BLD CALC: 29.5 % — LOW (ref 39–50)
HGB BLD-MCNC: 9.3 G/DL — LOW (ref 13–17)
MCHC RBC-ENTMCNC: 28.5 PG — SIGNIFICANT CHANGE UP (ref 27–34)
MCHC RBC-ENTMCNC: 31.5 GM/DL — LOW (ref 32–36)
MCV RBC AUTO: 90.5 FL — SIGNIFICANT CHANGE UP (ref 80–100)
NRBC # BLD: 0 /100 WBCS — SIGNIFICANT CHANGE UP (ref 0–0)
PLATELET # BLD AUTO: 238 K/UL — SIGNIFICANT CHANGE UP (ref 150–400)
POTASSIUM SERPL-MCNC: 3.4 MMOL/L — LOW (ref 3.5–5.3)
POTASSIUM SERPL-SCNC: 3.4 MMOL/L — LOW (ref 3.5–5.3)
RBC # BLD: 3.26 M/UL — LOW (ref 4.2–5.8)
RBC # FLD: 16.8 % — HIGH (ref 10.3–14.5)
SARS-COV-2 RNA SPEC QL NAA+PROBE: SIGNIFICANT CHANGE UP
SODIUM SERPL-SCNC: 139 MMOL/L — SIGNIFICANT CHANGE UP (ref 135–145)
WBC # BLD: 7.19 K/UL — SIGNIFICANT CHANGE UP (ref 3.8–10.5)
WBC # FLD AUTO: 7.19 K/UL — SIGNIFICANT CHANGE UP (ref 3.8–10.5)

## 2022-04-07 PROCEDURE — 99233 SBSQ HOSP IP/OBS HIGH 50: CPT

## 2022-04-07 RX ADMIN — PREGABALIN 1000 MICROGRAM(S): 225 CAPSULE ORAL at 12:45

## 2022-04-07 RX ADMIN — Medication 60 MILLIGRAM(S): at 05:47

## 2022-04-07 RX ADMIN — BUDESONIDE AND FORMOTEROL FUMARATE DIHYDRATE 2 PUFF(S): 160; 4.5 AEROSOL RESPIRATORY (INHALATION) at 17:52

## 2022-04-07 RX ADMIN — LAMOTRIGINE 100 MILLIGRAM(S): 25 TABLET, ORALLY DISINTEGRATING ORAL at 12:41

## 2022-04-07 RX ADMIN — Medication 10 MILLIGRAM(S): at 05:47

## 2022-04-07 RX ADMIN — CARVEDILOL PHOSPHATE 6.25 MILLIGRAM(S): 80 CAPSULE, EXTENDED RELEASE ORAL at 17:59

## 2022-04-07 RX ADMIN — ATORVASTATIN CALCIUM 10 MILLIGRAM(S): 80 TABLET, FILM COATED ORAL at 22:15

## 2022-04-07 RX ADMIN — Medication 5 MILLIGRAM(S): at 05:48

## 2022-04-07 RX ADMIN — Medication 150 MILLIGRAM(S): at 12:42

## 2022-04-07 RX ADMIN — Medication 0.2 MILLIGRAM(S): at 05:52

## 2022-04-07 RX ADMIN — Medication 0.2 MILLIGRAM(S): at 17:59

## 2022-04-07 RX ADMIN — ISOSORBIDE MONONITRATE 90 MILLIGRAM(S): 60 TABLET, EXTENDED RELEASE ORAL at 12:45

## 2022-04-07 RX ADMIN — ONDANSETRON 4 MILLIGRAM(S): 8 TABLET, FILM COATED ORAL at 17:53

## 2022-04-07 RX ADMIN — Medication 5 MILLIGRAM(S): at 22:15

## 2022-04-07 RX ADMIN — PANTOPRAZOLE SODIUM 40 MILLIGRAM(S): 20 TABLET, DELAYED RELEASE ORAL at 05:48

## 2022-04-07 RX ADMIN — Medication 650 MILLIGRAM(S): at 13:15

## 2022-04-07 RX ADMIN — AMLODIPINE BESYLATE 10 MILLIGRAM(S): 2.5 TABLET ORAL at 05:47

## 2022-04-07 RX ADMIN — Medication 100 MILLIGRAM(S): at 22:15

## 2022-04-07 RX ADMIN — Medication 4 MILLIGRAM(S): at 22:15

## 2022-04-07 RX ADMIN — Medication 100 MILLIGRAM(S): at 14:00

## 2022-04-07 RX ADMIN — PANTOPRAZOLE SODIUM 40 MILLIGRAM(S): 20 TABLET, DELAYED RELEASE ORAL at 17:59

## 2022-04-07 RX ADMIN — BUDESONIDE AND FORMOTEROL FUMARATE DIHYDRATE 2 PUFF(S): 160; 4.5 AEROSOL RESPIRATORY (INHALATION) at 05:52

## 2022-04-07 RX ADMIN — Medication 100 MILLIGRAM(S): at 05:48

## 2022-04-07 RX ADMIN — Medication 650 MILLIGRAM(S): at 12:46

## 2022-04-07 RX ADMIN — CARVEDILOL PHOSPHATE 6.25 MILLIGRAM(S): 80 CAPSULE, EXTENDED RELEASE ORAL at 05:47

## 2022-04-07 RX ADMIN — Medication 1 MILLIGRAM(S): at 12:44

## 2022-04-07 NOTE — CONSULT NOTE ADULT - SUBJECTIVE AND OBJECTIVE BOX
NEPHROLOGY CONSULTATION    CHIEF COMPLAINT: GIB    HPI:  Pt is 78 yo M with pmh of atrial fibrillation (eliquis), incomplete RBBB, CAD s/p stents, diastolic heart failure, HLD, HTN, CKD 3, DM, GIB s/p PRBC transfusion 10/21, anxiety/depression, presenting with rectal bleeding. Was admitted to [facility] (3/30 - 4/4/22), transfused PRBCs, had colonoscopy w/poor prep but showed diverticulosis and old blood, had another bleed, had NM scan which showed a possible jejunal bleed, and was transferred to [facility] for further care 4/4/22. No dizziness, chest pain, shortness of breath. Noted to have ARIELLA/CKD 3.     ROS:  as above    Allergies:  No Known Allergies    PAST MEDICAL & SURGICAL HISTORY:  HTN (hypertension)  c/b multiple episodes of hypertensive urgency  HLD (hyperlipidemia)  Atrial fibrillation  first documented on EKG 10/7/2021  CAD (coronary artery disease)  s/p stents (not on plavix)  Type 2 diabetes mellitus  not on home insulin/ Meds  Anxiety  Depression  History of diverticulitis  07/2021  Diverticulosis  c/b GIB in 2020  Afib  on AC  Stage 3 chronic kidney disease  Anemia of chronic disease  Monoclonal Gammopathy-MGUS  Chronic diastolic congestive heart failure  Multiple thyroid nodules  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  Had surgery ( April 2013 )  S/P tonsillectomy  S/P arthroscopic knee surgery  Bilateral ( 2005 )  Torsion of testicle  Had surgery at age 14  Pilonidal cyst  Had surgery ( 1969 )  S/P cataract surgery  Bilateral  H/O hernia repair    SOCIAL HISTORY:  negative    FAMILY HISTORY:  throat cancer    MEDICATIONS  (STANDING):  amLODIPine   Tablet 10 milliGRAM(s) Oral daily  atorvastatin 10 milliGRAM(s) Oral at bedtime  budesonide 160 MICROgram(s)/formoterol 4.5 MICROgram(s) Inhaler 2 Puff(s) Inhalation two times a day  carvedilol 6.25 milliGRAM(s) Oral every 12 hours  cloNIDine 0.2 milliGRAM(s) Oral two times a day  cyanocobalamin 1000 MICROGram(s) Oral daily  doxazosin 4 milliGRAM(s) Oral at bedtime  folic acid 1 milliGRAM(s) Oral daily  hydrALAZINE 100 milliGRAM(s) Oral three times a day  isosorbide   mononitrate ER Tablet (IMDUR) 90 milliGRAM(s) Oral daily  lamoTRIgine 100 milliGRAM(s) Oral daily  oxybutynin 5 milliGRAM(s) Oral three times a day  pantoprazole  Injectable 40 milliGRAM(s) IV Push two times a day  venlafaxine XR. 150 milliGRAM(s) Oral daily    Vital Signs Last 24 Hrs  T(C): 36.3 (04-07-22 @ 11:51), Max: 36.7 (04-07-22 @ 04:30)  T(F): 97.4 (04-07-22 @ 11:51), Max: 98.1 (04-07-22 @ 04:30)  HR: 63 (04-07-22 @ 11:51) (62 - 66)  BP: 133/71 (04-07-22 @ 11:51) (116/65 - 136/80)  RR: 18 (04-07-22 @ 11:51) (18 - 18)  SpO2: 98% (04-07-22 @ 11:51) (95% - 98%)    I&O's Detail    06 Apr 2022 07:01  -  07 Apr 2022 07:00  --------------------------------------------------------  IN:    Oral Fluid: 480 mL  Total IN: 480 mL    OUT:    Voided (mL): 851 mL  Total OUT: 851 mL    Total NET: -371 mL    LABS:                        9.3    7.19  )-----------( 238      ( 07 Apr 2022 06:59 )             29.5     04-07    139  |  101  |  36<H>  ----------------------------<  111<H>  3.4<L>   |  22  |  2.77<H>    Ca    9.3      07 Apr 2022 06:59  Phos  4.2     04-06  Mg     2.1     04-06    A/P:    full consult to follow    [phone] NEPHROLOGY CONSULTATION    CHIEF COMPLAINT: GIB    HPI:  Pt is 76 yo M with pmh of atrial fibrillation (eliquis), incomplete RBBB, CAD s/p stents, diastolic heart failure, HLD, HTN, CKD 3, DM, GIB, anxiety/depression, presenting with rectal bleeding. Was admitted to [facility] (3/30 - 4/4/22), transfused PRBCs, had colonoscopy w/poor prep but showed diverticulosis and old blood, had another bleed, had NM scan which showed a possible jejunal bleed, and was transferred to [facility] for further care 4/4/22. No dizziness, chest pain, shortness of breath. Noted to have ARIELLA/CKD 3. No F/C.    ROS:  as above    Allergies:  No Known Allergies    PAST MEDICAL & SURGICAL HISTORY:  HTN (hypertension)  c/b multiple episodes of hypertensive urgency  HLD (hyperlipidemia)  Atrial fibrillation  first documented on EKG 10/7/2021  CAD (coronary artery disease)  s/p stents (not on plavix)  Type 2 diabetes mellitus  not on home insulin/ Meds  Anxiety  Depression  History of diverticulitis  07/2021  Diverticulosis  c/b GIB in 2020  Afib  on AC  Stage 3 chronic kidney disease  Anemia of chronic disease  Monoclonal Gammopathy-MGUS  Chronic diastolic congestive heart failure  Multiple thyroid nodules  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  Had surgery ( April 2013 )  S/P tonsillectomy  S/P arthroscopic knee surgery  Bilateral ( 2005 )  Torsion of testicle  Had surgery at age 14  Pilonidal cyst  Had surgery ( 1969 )  S/P cataract surgery  Bilateral  H/O hernia repair    SOCIAL HISTORY:  negative    FAMILY HISTORY:  throat cancer    MEDICATIONS  (STANDING):  amLODIPine   Tablet 10 milliGRAM(s) Oral daily  atorvastatin 10 milliGRAM(s) Oral at bedtime  budesonide 160 MICROgram(s)/formoterol 4.5 MICROgram(s) Inhaler 2 Puff(s) Inhalation two times a day  carvedilol 6.25 milliGRAM(s) Oral every 12 hours  cloNIDine 0.2 milliGRAM(s) Oral two times a day  cyanocobalamin 1000 MICROGram(s) Oral daily  doxazosin 4 milliGRAM(s) Oral at bedtime  folic acid 1 milliGRAM(s) Oral daily  hydrALAZINE 100 milliGRAM(s) Oral three times a day  isosorbide   mononitrate ER Tablet (IMDUR) 90 milliGRAM(s) Oral daily  lamoTRIgine 100 milliGRAM(s) Oral daily  oxybutynin 5 milliGRAM(s) Oral three times a day  pantoprazole  Injectable 40 milliGRAM(s) IV Push two times a day  venlafaxine XR. 150 milliGRAM(s) Oral daily    Vital Signs Last 24 Hrs  T(C): 36.3 (04-07-22 @ 11:51), Max: 36.7 (04-07-22 @ 04:30)  T(F): 97.4 (04-07-22 @ 11:51), Max: 98.1 (04-07-22 @ 04:30)  HR: 63 (04-07-22 @ 11:51) (62 - 66)  BP: 133/71 (04-07-22 @ 11:51) (116/65 - 136/80)  RR: 18 (04-07-22 @ 11:51) (18 - 18)  SpO2: 98% (04-07-22 @ 11:51) (95% - 98%)    I&O's Detail    06 Apr 2022 07:01  -  07 Apr 2022 07:00  --------------------------------------------------------  IN:    Oral Fluid: 480 mL  Total IN: 480 mL    OUT:    Voided (mL): 851 mL  Total OUT: 851 mL    Total NET: -371 mL    s1s2  b/l air entry  soft, ND  no edema    LABS:                        9.3    7.19  )-----------( 238      ( 07 Apr 2022 06:59 )             29.5     04-07    139  |  101  |  36<H>  ----------------------------<  111<H>  3.4<L>   |  22  |  2.77<H>    Ca    9.3      07 Apr 2022 06:59  Phos  4.2     04-06  Mg     2.1     04-06    A/P:    Suspect hemodynamic ARIELLA/CKD 3 (Cr 1.96 - 4/5/22)  Agree w/holding diuretics  UA w/pr 30, otherwise bland  CT A/P w/o hydro  Will check PVR  Avoid nephrotoxins  CBC, BMP, Mg in am    [phone]

## 2022-04-08 LAB
ANION GAP SERPL CALC-SCNC: 13 MMOL/L — SIGNIFICANT CHANGE UP (ref 5–17)
BUN SERPL-MCNC: 33 MG/DL — HIGH (ref 7–23)
CALCIUM SERPL-MCNC: 9 MG/DL — SIGNIFICANT CHANGE UP (ref 8.4–10.5)
CHLORIDE SERPL-SCNC: 103 MMOL/L — SIGNIFICANT CHANGE UP (ref 96–108)
CO2 SERPL-SCNC: 23 MMOL/L — SIGNIFICANT CHANGE UP (ref 22–31)
CREAT SERPL-MCNC: 2.2 MG/DL — HIGH (ref 0.5–1.3)
EGFR: 30 ML/MIN/1.73M2 — LOW
GLUCOSE SERPL-MCNC: 101 MG/DL — HIGH (ref 70–99)
MAGNESIUM SERPL-MCNC: 2.1 MG/DL — SIGNIFICANT CHANGE UP (ref 1.6–2.6)
POTASSIUM SERPL-MCNC: 3.1 MMOL/L — LOW (ref 3.5–5.3)
POTASSIUM SERPL-SCNC: 3.1 MMOL/L — LOW (ref 3.5–5.3)
SODIUM SERPL-SCNC: 139 MMOL/L — SIGNIFICANT CHANGE UP (ref 135–145)

## 2022-04-08 PROCEDURE — 99232 SBSQ HOSP IP/OBS MODERATE 35: CPT

## 2022-04-08 PROCEDURE — 93010 ELECTROCARDIOGRAM REPORT: CPT

## 2022-04-08 RX ORDER — POTASSIUM CHLORIDE 20 MEQ
20 PACKET (EA) ORAL ONCE
Refills: 0 | Status: COMPLETED | OUTPATIENT
Start: 2022-04-08 | End: 2022-04-08

## 2022-04-08 RX ORDER — CARVEDILOL PHOSPHATE 80 MG/1
3.12 CAPSULE, EXTENDED RELEASE ORAL EVERY 12 HOURS
Refills: 0 | Status: DISCONTINUED | OUTPATIENT
Start: 2022-04-08 | End: 2022-04-11

## 2022-04-08 RX ADMIN — CARVEDILOL PHOSPHATE 6.25 MILLIGRAM(S): 80 CAPSULE, EXTENDED RELEASE ORAL at 06:08

## 2022-04-08 RX ADMIN — ATORVASTATIN CALCIUM 10 MILLIGRAM(S): 80 TABLET, FILM COATED ORAL at 21:19

## 2022-04-08 RX ADMIN — Medication 5 MILLIGRAM(S): at 21:19

## 2022-04-08 RX ADMIN — Medication 20 MILLIEQUIVALENT(S): at 17:52

## 2022-04-08 RX ADMIN — Medication 150 MILLIGRAM(S): at 12:06

## 2022-04-08 RX ADMIN — AMLODIPINE BESYLATE 10 MILLIGRAM(S): 2.5 TABLET ORAL at 06:07

## 2022-04-08 RX ADMIN — Medication 100 MILLIGRAM(S): at 06:07

## 2022-04-08 RX ADMIN — Medication 1 MILLIGRAM(S): at 12:06

## 2022-04-08 RX ADMIN — BUDESONIDE AND FORMOTEROL FUMARATE DIHYDRATE 2 PUFF(S): 160; 4.5 AEROSOL RESPIRATORY (INHALATION) at 17:53

## 2022-04-08 RX ADMIN — Medication 5 MILLIGRAM(S): at 06:07

## 2022-04-08 RX ADMIN — ISOSORBIDE MONONITRATE 90 MILLIGRAM(S): 60 TABLET, EXTENDED RELEASE ORAL at 12:06

## 2022-04-08 RX ADMIN — Medication 100 MILLIGRAM(S): at 21:18

## 2022-04-08 RX ADMIN — BUDESONIDE AND FORMOTEROL FUMARATE DIHYDRATE 2 PUFF(S): 160; 4.5 AEROSOL RESPIRATORY (INHALATION) at 06:06

## 2022-04-08 RX ADMIN — PANTOPRAZOLE SODIUM 40 MILLIGRAM(S): 20 TABLET, DELAYED RELEASE ORAL at 17:57

## 2022-04-08 RX ADMIN — Medication 4 MILLIGRAM(S): at 21:19

## 2022-04-08 RX ADMIN — Medication 100 MILLIGRAM(S): at 14:47

## 2022-04-08 RX ADMIN — LAMOTRIGINE 100 MILLIGRAM(S): 25 TABLET, ORALLY DISINTEGRATING ORAL at 12:08

## 2022-04-08 RX ADMIN — Medication 5 MILLIGRAM(S): at 14:47

## 2022-04-08 RX ADMIN — CARVEDILOL PHOSPHATE 3.12 MILLIGRAM(S): 80 CAPSULE, EXTENDED RELEASE ORAL at 17:58

## 2022-04-08 RX ADMIN — Medication 0.2 MILLIGRAM(S): at 06:07

## 2022-04-08 RX ADMIN — PANTOPRAZOLE SODIUM 40 MILLIGRAM(S): 20 TABLET, DELAYED RELEASE ORAL at 06:07

## 2022-04-08 RX ADMIN — PREGABALIN 1000 MICROGRAM(S): 225 CAPSULE ORAL at 12:07

## 2022-04-08 RX ADMIN — Medication 0.2 MILLIGRAM(S): at 17:53

## 2022-04-08 NOTE — CHART NOTE - NSCHARTNOTEFT_GEN_A_CORE
Capsule reviewed. One small spot of blood at 93 percent of small bowel capsule transient time. No other pathology appreciated. No overt bleeding seen.     Plan: Advance diet   Periodic CBC and iron supplementation as needed   Avoid NSAIDs
Late entry Note :   Asked to evaluate Pt with 2 sec pause on Tele , from SR to SR , seen and evaluated ,  found asleep in bed , asymptomatic .       Vital Signs Last 24 Hrs  T(C): 36.4 (08 Apr 2022 12:29), Max: 36.4 (07 Apr 2022 21:00)  T(F): 97.5 (08 Apr 2022 12:29), Max: 97.6 (08 Apr 2022 04:32)  HR: 65 (08 Apr 2022 12:29) (59 - 76)  BP: 159/91 (08 Apr 2022 12:29) (120/72 - 165/80)  BP(mean): 113 (08 Apr 2022 12:29) (113 - 113)  RR: 18 (08 Apr 2022 12:29) (18 - 20)  SpO2: 96% (08 Apr 2022 12:29) (95% - 98%)    Physical Exam:  General: WN/WD NAD  Neurology: A&Ox3, nonfocal, SERNA x 4  Head:  Normocephalic, atraumatic  Respiratory: CTA B/L  CV: RRR, S1S2, no murmur  Abdominal: Soft, NT, ND no palpable mass  MSK: No edema, + peripheral pulses, FROM all 4 extremity  Labs:                          9.3    7.19  )-----------( 238      ( 07 Apr 2022 06:59 )             29.5     04-08    139  |  103  |  33<H>  ----------------------------<  101<H>  3.1<L>   |  23  |  2.20<H>    Ca    9.0      08 Apr 2022 06:55  Mg     2.1     04-08              Radiology:        Assessment & Plan: 77 yr old with h/o Afib , DHF , CKD admitted for GIB  now with 2 sec  sinus pause on Tele   > EKG , tele reviewed , no other events noted   > R2 pads on Pt please   >d/w DR Monterroso , will decrease Coreg to 3.125 mg BID   >C/W Tele , will follow     Katie Dougherty NP-C   #[id number]
Risks of video capsule endoscopy explained, including risk of retained capsule, potentially requiring surgery for removal.  Patient understands and agrees to risks.    Capsule swallowed at: 1315    NPO now.   Resume Clear liquid diet at:  1515   Resume regular consistency diet at: 1715 if okay per primary GI Dr. Jackson's team.    Equipment can be removed at: 0115 on 4/6    GI fellow will retrieve equipment in the morning.    NO MRI UNTIL CAPSULE CLEARANCE CONFIRMED. CAPSULE IS NOT MRI COMPATIBLE.

## 2022-04-09 LAB
ANION GAP SERPL CALC-SCNC: 14 MMOL/L — SIGNIFICANT CHANGE UP (ref 5–17)
BUN SERPL-MCNC: 33 MG/DL — HIGH (ref 7–23)
CALCIUM SERPL-MCNC: 9.1 MG/DL — SIGNIFICANT CHANGE UP (ref 8.4–10.5)
CHLORIDE SERPL-SCNC: 103 MMOL/L — SIGNIFICANT CHANGE UP (ref 96–108)
CO2 SERPL-SCNC: 21 MMOL/L — LOW (ref 22–31)
CREAT SERPL-MCNC: 2.1 MG/DL — HIGH (ref 0.5–1.3)
EGFR: 32 ML/MIN/1.73M2 — LOW
GLUCOSE SERPL-MCNC: 100 MG/DL — HIGH (ref 70–99)
HCT VFR BLD CALC: 29.2 % — LOW (ref 39–50)
HGB BLD-MCNC: 9.2 G/DL — LOW (ref 13–17)
MAGNESIUM SERPL-MCNC: 2.5 MG/DL — SIGNIFICANT CHANGE UP (ref 1.6–2.6)
MCHC RBC-ENTMCNC: 28.6 PG — SIGNIFICANT CHANGE UP (ref 27–34)
MCHC RBC-ENTMCNC: 31.5 GM/DL — LOW (ref 32–36)
MCV RBC AUTO: 90.7 FL — SIGNIFICANT CHANGE UP (ref 80–100)
NRBC # BLD: 0 /100 WBCS — SIGNIFICANT CHANGE UP (ref 0–0)
PLATELET # BLD AUTO: 229 K/UL — SIGNIFICANT CHANGE UP (ref 150–400)
POTASSIUM SERPL-MCNC: 3.2 MMOL/L — LOW (ref 3.5–5.3)
POTASSIUM SERPL-SCNC: 3.2 MMOL/L — LOW (ref 3.5–5.3)
RBC # BLD: 3.22 M/UL — LOW (ref 4.2–5.8)
RBC # FLD: 16.3 % — HIGH (ref 10.3–14.5)
SODIUM SERPL-SCNC: 138 MMOL/L — SIGNIFICANT CHANGE UP (ref 135–145)
WBC # BLD: 6.37 K/UL — SIGNIFICANT CHANGE UP (ref 3.8–10.5)
WBC # FLD AUTO: 6.37 K/UL — SIGNIFICANT CHANGE UP (ref 3.8–10.5)

## 2022-04-09 PROCEDURE — 99232 SBSQ HOSP IP/OBS MODERATE 35: CPT

## 2022-04-09 RX ORDER — APIXABAN 2.5 MG/1
5 TABLET, FILM COATED ORAL EVERY 12 HOURS
Refills: 0 | Status: DISCONTINUED | OUTPATIENT
Start: 2022-04-09 | End: 2022-04-11

## 2022-04-09 RX ORDER — POTASSIUM CHLORIDE 20 MEQ
40 PACKET (EA) ORAL ONCE
Refills: 0 | Status: COMPLETED | OUTPATIENT
Start: 2022-04-09 | End: 2022-04-09

## 2022-04-09 RX ADMIN — Medication 150 MILLIGRAM(S): at 11:54

## 2022-04-09 RX ADMIN — ISOSORBIDE MONONITRATE 90 MILLIGRAM(S): 60 TABLET, EXTENDED RELEASE ORAL at 11:53

## 2022-04-09 RX ADMIN — APIXABAN 5 MILLIGRAM(S): 2.5 TABLET, FILM COATED ORAL at 07:21

## 2022-04-09 RX ADMIN — PREGABALIN 1000 MICROGRAM(S): 225 CAPSULE ORAL at 11:54

## 2022-04-09 RX ADMIN — AMLODIPINE BESYLATE 10 MILLIGRAM(S): 2.5 TABLET ORAL at 05:09

## 2022-04-09 RX ADMIN — BUDESONIDE AND FORMOTEROL FUMARATE DIHYDRATE 2 PUFF(S): 160; 4.5 AEROSOL RESPIRATORY (INHALATION) at 05:10

## 2022-04-09 RX ADMIN — CARVEDILOL PHOSPHATE 3.12 MILLIGRAM(S): 80 CAPSULE, EXTENDED RELEASE ORAL at 18:34

## 2022-04-09 RX ADMIN — Medication 0.2 MILLIGRAM(S): at 05:09

## 2022-04-09 RX ADMIN — Medication 40 MILLIEQUIVALENT(S): at 18:44

## 2022-04-09 RX ADMIN — PANTOPRAZOLE SODIUM 40 MILLIGRAM(S): 20 TABLET, DELAYED RELEASE ORAL at 05:10

## 2022-04-09 RX ADMIN — CARVEDILOL PHOSPHATE 3.12 MILLIGRAM(S): 80 CAPSULE, EXTENDED RELEASE ORAL at 05:09

## 2022-04-09 RX ADMIN — Medication 1 MILLIGRAM(S): at 11:55

## 2022-04-09 RX ADMIN — ATORVASTATIN CALCIUM 10 MILLIGRAM(S): 80 TABLET, FILM COATED ORAL at 21:30

## 2022-04-09 RX ADMIN — PANTOPRAZOLE SODIUM 40 MILLIGRAM(S): 20 TABLET, DELAYED RELEASE ORAL at 18:33

## 2022-04-09 RX ADMIN — Medication 5 MILLIGRAM(S): at 05:10

## 2022-04-09 RX ADMIN — Medication 4 MILLIGRAM(S): at 21:30

## 2022-04-09 RX ADMIN — Medication 100 MILLIGRAM(S): at 05:09

## 2022-04-09 RX ADMIN — Medication 100 MILLIGRAM(S): at 21:30

## 2022-04-09 RX ADMIN — Medication 5 MILLIGRAM(S): at 21:30

## 2022-04-09 RX ADMIN — Medication 650 MILLIGRAM(S): at 19:30

## 2022-04-09 RX ADMIN — Medication 650 MILLIGRAM(S): at 20:15

## 2022-04-09 RX ADMIN — BUDESONIDE AND FORMOTEROL FUMARATE DIHYDRATE 2 PUFF(S): 160; 4.5 AEROSOL RESPIRATORY (INHALATION) at 18:33

## 2022-04-09 RX ADMIN — Medication 5 MILLIGRAM(S): at 13:25

## 2022-04-09 RX ADMIN — Medication 100 MILLIGRAM(S): at 13:25

## 2022-04-09 RX ADMIN — APIXABAN 5 MILLIGRAM(S): 2.5 TABLET, FILM COATED ORAL at 18:43

## 2022-04-09 RX ADMIN — LAMOTRIGINE 100 MILLIGRAM(S): 25 TABLET, ORALLY DISINTEGRATING ORAL at 11:54

## 2022-04-09 RX ADMIN — Medication 0.2 MILLIGRAM(S): at 18:34

## 2022-04-10 LAB
ANION GAP SERPL CALC-SCNC: 12 MMOL/L — SIGNIFICANT CHANGE UP (ref 5–17)
BUN SERPL-MCNC: 26 MG/DL — HIGH (ref 7–23)
CALCIUM SERPL-MCNC: 9 MG/DL — SIGNIFICANT CHANGE UP (ref 8.4–10.5)
CHLORIDE SERPL-SCNC: 106 MMOL/L — SIGNIFICANT CHANGE UP (ref 96–108)
CO2 SERPL-SCNC: 22 MMOL/L — SIGNIFICANT CHANGE UP (ref 22–31)
CREAT SERPL-MCNC: 1.86 MG/DL — HIGH (ref 0.5–1.3)
EGFR: 37 ML/MIN/1.73M2 — LOW
GLUCOSE SERPL-MCNC: 93 MG/DL — SIGNIFICANT CHANGE UP (ref 70–99)
HCT VFR BLD CALC: 27.4 % — LOW (ref 39–50)
HGB BLD-MCNC: 8.8 G/DL — LOW (ref 13–17)
MCHC RBC-ENTMCNC: 28.9 PG — SIGNIFICANT CHANGE UP (ref 27–34)
MCHC RBC-ENTMCNC: 32.1 GM/DL — SIGNIFICANT CHANGE UP (ref 32–36)
MCV RBC AUTO: 90.1 FL — SIGNIFICANT CHANGE UP (ref 80–100)
NRBC # BLD: 0 /100 WBCS — SIGNIFICANT CHANGE UP (ref 0–0)
PLATELET # BLD AUTO: 234 K/UL — SIGNIFICANT CHANGE UP (ref 150–400)
POTASSIUM SERPL-MCNC: 3.5 MMOL/L — SIGNIFICANT CHANGE UP (ref 3.5–5.3)
POTASSIUM SERPL-SCNC: 3.5 MMOL/L — SIGNIFICANT CHANGE UP (ref 3.5–5.3)
RBC # BLD: 3.04 M/UL — LOW (ref 4.2–5.8)
RBC # FLD: 16.3 % — HIGH (ref 10.3–14.5)
SARS-COV-2 RNA SPEC QL NAA+PROBE: SIGNIFICANT CHANGE UP
SODIUM SERPL-SCNC: 140 MMOL/L — SIGNIFICANT CHANGE UP (ref 135–145)
WBC # BLD: 4.97 K/UL — SIGNIFICANT CHANGE UP (ref 3.8–10.5)
WBC # FLD AUTO: 4.97 K/UL — SIGNIFICANT CHANGE UP (ref 3.8–10.5)

## 2022-04-10 PROCEDURE — 99232 SBSQ HOSP IP/OBS MODERATE 35: CPT

## 2022-04-10 RX ADMIN — Medication 100 MILLIGRAM(S): at 06:33

## 2022-04-10 RX ADMIN — LAMOTRIGINE 100 MILLIGRAM(S): 25 TABLET, ORALLY DISINTEGRATING ORAL at 11:42

## 2022-04-10 RX ADMIN — Medication 650 MILLIGRAM(S): at 20:04

## 2022-04-10 RX ADMIN — Medication 0.2 MILLIGRAM(S): at 06:34

## 2022-04-10 RX ADMIN — PANTOPRAZOLE SODIUM 40 MILLIGRAM(S): 20 TABLET, DELAYED RELEASE ORAL at 06:33

## 2022-04-10 RX ADMIN — Medication 100 MILLIGRAM(S): at 21:37

## 2022-04-10 RX ADMIN — Medication 5 MILLIGRAM(S): at 14:25

## 2022-04-10 RX ADMIN — Medication 0.2 MILLIGRAM(S): at 18:39

## 2022-04-10 RX ADMIN — Medication 5 MILLIGRAM(S): at 21:37

## 2022-04-10 RX ADMIN — Medication 4 MILLIGRAM(S): at 21:37

## 2022-04-10 RX ADMIN — ISOSORBIDE MONONITRATE 90 MILLIGRAM(S): 60 TABLET, EXTENDED RELEASE ORAL at 11:47

## 2022-04-10 RX ADMIN — APIXABAN 5 MILLIGRAM(S): 2.5 TABLET, FILM COATED ORAL at 18:39

## 2022-04-10 RX ADMIN — PANTOPRAZOLE SODIUM 40 MILLIGRAM(S): 20 TABLET, DELAYED RELEASE ORAL at 18:38

## 2022-04-10 RX ADMIN — Medication 100 MILLIGRAM(S): at 14:25

## 2022-04-10 RX ADMIN — ATORVASTATIN CALCIUM 10 MILLIGRAM(S): 80 TABLET, FILM COATED ORAL at 21:37

## 2022-04-10 RX ADMIN — Medication 1 MILLIGRAM(S): at 11:42

## 2022-04-10 RX ADMIN — CARVEDILOL PHOSPHATE 3.12 MILLIGRAM(S): 80 CAPSULE, EXTENDED RELEASE ORAL at 06:33

## 2022-04-10 RX ADMIN — Medication 650 MILLIGRAM(S): at 20:55

## 2022-04-10 RX ADMIN — AMLODIPINE BESYLATE 10 MILLIGRAM(S): 2.5 TABLET ORAL at 06:33

## 2022-04-10 RX ADMIN — BUDESONIDE AND FORMOTEROL FUMARATE DIHYDRATE 2 PUFF(S): 160; 4.5 AEROSOL RESPIRATORY (INHALATION) at 18:38

## 2022-04-10 RX ADMIN — Medication 150 MILLIGRAM(S): at 12:44

## 2022-04-10 RX ADMIN — CARVEDILOL PHOSPHATE 3.12 MILLIGRAM(S): 80 CAPSULE, EXTENDED RELEASE ORAL at 18:39

## 2022-04-10 RX ADMIN — Medication 5 MILLIGRAM(S): at 06:34

## 2022-04-10 RX ADMIN — APIXABAN 5 MILLIGRAM(S): 2.5 TABLET, FILM COATED ORAL at 06:35

## 2022-04-10 RX ADMIN — PREGABALIN 1000 MICROGRAM(S): 225 CAPSULE ORAL at 11:42

## 2022-04-10 NOTE — PROGRESS NOTE ADULT - PROBLEM SELECTOR PLAN 1
s/p recent prbc   monitor hb closely   capsule study as per gi
s/p recent prbc   monitor hb closely   NM bleeding scan positive for jejunal bleed   capsule completed, results noted; One small spot of blood at 93 percent of small bowel capsule transient time. No other pathology appreciated. No overt bleeding seen.  Monitor CBC while Ac resumed
s/p recent prbc   monitor hb closely   capsule study as per gi

## 2022-04-10 NOTE — PROGRESS NOTE ADULT - PROBLEM SELECTOR PLAN 6
a1c 5.9, blood glucose has been in acceptable range for the last few days at OSH

## 2022-04-10 NOTE — PROGRESS NOTE ADULT - PROBLEM SELECTOR PLAN 5
cont venlafaxine, lamotrigine

## 2022-04-10 NOTE — PROGRESS NOTE ADULT - PROBLEM SELECTOR PLAN 4
cont atorvastatin 10

## 2022-04-10 NOTE — PROGRESS NOTE ADULT - PROBLEM SELECTOR PLAN 2
holding eliquis due to GI beed  cont coreg

## 2022-04-11 ENCOUNTER — TRANSCRIPTION ENCOUNTER (OUTPATIENT)
Age: 77
End: 2022-04-11

## 2022-04-11 VITALS
RESPIRATION RATE: 18 BRPM | OXYGEN SATURATION: 97 % | TEMPERATURE: 98 F | HEART RATE: 62 BPM | DIASTOLIC BLOOD PRESSURE: 79 MMHG | SYSTOLIC BLOOD PRESSURE: 158 MMHG

## 2022-04-11 LAB
ANION GAP SERPL CALC-SCNC: 10 MMOL/L — SIGNIFICANT CHANGE UP (ref 5–17)
BUN SERPL-MCNC: 19 MG/DL — SIGNIFICANT CHANGE UP (ref 7–23)
CALCIUM SERPL-MCNC: 9.1 MG/DL — SIGNIFICANT CHANGE UP (ref 8.4–10.5)
CHLORIDE SERPL-SCNC: 108 MMOL/L — SIGNIFICANT CHANGE UP (ref 96–108)
CO2 SERPL-SCNC: 23 MMOL/L — SIGNIFICANT CHANGE UP (ref 22–31)
CREAT SERPL-MCNC: 1.55 MG/DL — HIGH (ref 0.5–1.3)
EGFR: 46 ML/MIN/1.73M2 — LOW
GLUCOSE SERPL-MCNC: 104 MG/DL — HIGH (ref 70–99)
HCT VFR BLD CALC: 29.3 % — LOW (ref 39–50)
HGB BLD-MCNC: 9.5 G/DL — LOW (ref 13–17)
MCHC RBC-ENTMCNC: 29.1 PG — SIGNIFICANT CHANGE UP (ref 27–34)
MCHC RBC-ENTMCNC: 32.4 GM/DL — SIGNIFICANT CHANGE UP (ref 32–36)
MCV RBC AUTO: 89.6 FL — SIGNIFICANT CHANGE UP (ref 80–100)
NRBC # BLD: 0 /100 WBCS — SIGNIFICANT CHANGE UP (ref 0–0)
PLATELET # BLD AUTO: 235 K/UL — SIGNIFICANT CHANGE UP (ref 150–400)
POTASSIUM SERPL-MCNC: 3.4 MMOL/L — LOW (ref 3.5–5.3)
POTASSIUM SERPL-SCNC: 3.4 MMOL/L — LOW (ref 3.5–5.3)
RBC # BLD: 3.27 M/UL — LOW (ref 4.2–5.8)
RBC # FLD: 16.1 % — HIGH (ref 10.3–14.5)
SODIUM SERPL-SCNC: 141 MMOL/L — SIGNIFICANT CHANGE UP (ref 135–145)
WBC # BLD: 4.86 K/UL — SIGNIFICANT CHANGE UP (ref 3.8–10.5)
WBC # FLD AUTO: 4.86 K/UL — SIGNIFICANT CHANGE UP (ref 3.8–10.5)

## 2022-04-11 PROCEDURE — 85730 THROMBOPLASTIN TIME PARTIAL: CPT

## 2022-04-11 PROCEDURE — 80053 COMPREHEN METABOLIC PANEL: CPT

## 2022-04-11 PROCEDURE — 85610 PROTHROMBIN TIME: CPT

## 2022-04-11 PROCEDURE — 80048 BASIC METABOLIC PNL TOTAL CA: CPT

## 2022-04-11 PROCEDURE — 99232 SBSQ HOSP IP/OBS MODERATE 35: CPT

## 2022-04-11 PROCEDURE — 86901 BLOOD TYPING SEROLOGIC RH(D): CPT

## 2022-04-11 PROCEDURE — 86850 RBC ANTIBODY SCREEN: CPT

## 2022-04-11 PROCEDURE — 86900 BLOOD TYPING SEROLOGIC ABO: CPT

## 2022-04-11 PROCEDURE — 97116 GAIT TRAINING THERAPY: CPT

## 2022-04-11 PROCEDURE — 96374 THER/PROPH/DIAG INJ IV PUSH: CPT

## 2022-04-11 PROCEDURE — 93005 ELECTROCARDIOGRAM TRACING: CPT

## 2022-04-11 PROCEDURE — 84100 ASSAY OF PHOSPHORUS: CPT

## 2022-04-11 PROCEDURE — 99285 EMERGENCY DEPT VISIT HI MDM: CPT

## 2022-04-11 PROCEDURE — 97530 THERAPEUTIC ACTIVITIES: CPT

## 2022-04-11 PROCEDURE — 36415 COLL VENOUS BLD VENIPUNCTURE: CPT

## 2022-04-11 PROCEDURE — U0005: CPT

## 2022-04-11 PROCEDURE — 85025 COMPLETE CBC W/AUTO DIFF WBC: CPT

## 2022-04-11 PROCEDURE — 83735 ASSAY OF MAGNESIUM: CPT

## 2022-04-11 PROCEDURE — 97161 PT EVAL LOW COMPLEX 20 MIN: CPT

## 2022-04-11 PROCEDURE — 85027 COMPLETE CBC AUTOMATED: CPT

## 2022-04-11 PROCEDURE — 94640 AIRWAY INHALATION TREATMENT: CPT

## 2022-04-11 PROCEDURE — U0003: CPT

## 2022-04-11 RX ORDER — CARVEDILOL PHOSPHATE 80 MG/1
1 CAPSULE, EXTENDED RELEASE ORAL
Qty: 0 | Refills: 0 | DISCHARGE

## 2022-04-11 RX ORDER — CARVEDILOL PHOSPHATE 80 MG/1
1 CAPSULE, EXTENDED RELEASE ORAL
Qty: 60 | Refills: 0
Start: 2022-04-11 | End: 2022-05-10

## 2022-04-11 RX ORDER — FUROSEMIDE 40 MG
1 TABLET ORAL
Qty: 30 | Refills: 0
Start: 2022-04-11 | End: 2022-05-10

## 2022-04-11 RX ORDER — FUROSEMIDE 40 MG
3 TABLET ORAL
Qty: 0 | Refills: 0 | DISCHARGE

## 2022-04-11 RX ADMIN — Medication 650 MILLIGRAM(S): at 09:30

## 2022-04-11 RX ADMIN — PREGABALIN 1000 MICROGRAM(S): 225 CAPSULE ORAL at 11:53

## 2022-04-11 RX ADMIN — APIXABAN 5 MILLIGRAM(S): 2.5 TABLET, FILM COATED ORAL at 05:42

## 2022-04-11 RX ADMIN — Medication 150 MILLIGRAM(S): at 11:53

## 2022-04-11 RX ADMIN — Medication 650 MILLIGRAM(S): at 08:32

## 2022-04-11 RX ADMIN — ISOSORBIDE MONONITRATE 90 MILLIGRAM(S): 60 TABLET, EXTENDED RELEASE ORAL at 11:55

## 2022-04-11 RX ADMIN — Medication 1 MILLIGRAM(S): at 11:53

## 2022-04-11 RX ADMIN — Medication 5 MILLIGRAM(S): at 05:42

## 2022-04-11 RX ADMIN — Medication 0.2 MILLIGRAM(S): at 05:42

## 2022-04-11 RX ADMIN — PANTOPRAZOLE SODIUM 40 MILLIGRAM(S): 20 TABLET, DELAYED RELEASE ORAL at 05:42

## 2022-04-11 RX ADMIN — Medication 100 MILLIGRAM(S): at 05:42

## 2022-04-11 RX ADMIN — AMLODIPINE BESYLATE 10 MILLIGRAM(S): 2.5 TABLET ORAL at 05:42

## 2022-04-11 RX ADMIN — LAMOTRIGINE 100 MILLIGRAM(S): 25 TABLET, ORALLY DISINTEGRATING ORAL at 11:55

## 2022-04-11 RX ADMIN — CARVEDILOL PHOSPHATE 3.12 MILLIGRAM(S): 80 CAPSULE, EXTENDED RELEASE ORAL at 05:42

## 2022-04-11 NOTE — DISCHARGE NOTE PROVIDER - NSDCMRMEDTOKEN_GEN_ALL_CORE_FT
amLODIPine 10 mg oral tablet: 1 tab(s) orally once a day  [place] 1064 mg/3.9 mL intramuscular suspension, extended release: 1 dose(s) intramuscular every 8 weeks  atorvastatin 10 mg oral tablet: 1 tab(s) orally once a day (at bedtime)  budesonide-formoterol 160 mcg-4.5 mcg/inh inhalation aerosol: 2 puff(s) inhaled 2 times a day   carvedilol 3.125 mg oral tablet: 1 tab(s) orally every 12 hours MDD:2  cloNIDine 0.2 mg oral tablet: 1 tab(s) orally 2 times a day  diclofenac 1% topical gel: Apply topically to affected area 3 times a day  doxazosin 4 mg oral tablet: 1 tab(s) orally 2 times a day  Eliquis 5 mg oral tablet: 1 tab(s) orally 2 times a day  famotidine 20 mg oral tablet: 1 tab(s) orally once a day  folic acid 1 mg oral tablet: 1 tab(s) orally once a day  furosemide 20 mg oral tablet: 3 tab(s) orally 2 times a day  hydrALAZINE 100 mg oral tablet: 1 tab(s) orally 3 times a day  isosorbide mononitrate 30 mg oral tablet, extended release: 3 tab(s) orally once a day  Total 90 mg daily by mouth  lactobacillus acidophilus oral capsule: 1 cap(s) orally 2 times a day  lamoTRIgine 100 mg oral tablet: 1 tab(s) orally once a day  metFORMIN 500 mg oral tablet: 1 tab(s) orally 2 times a day  oxybutynin 15 mg/24 hr oral tablet, extended release: 1 tab(s) orally once a day  venlafaxine 150 mg oral capsule, extended release: 1 cap(s) orally once a day  Vitamin B-12 1000 mcg oral tablet: 1 tab(s) orally once a day   amLODIPine 10 mg oral tablet: 1 tab(s) orally once a day  [place] 1064 mg/3.9 mL intramuscular suspension, extended release: 1 dose(s) intramuscular every 8 weeks  atorvastatin 10 mg oral tablet: 1 tab(s) orally once a day (at bedtime)  budesonide-formoterol 160 mcg-4.5 mcg/inh inhalation aerosol: 2 puff(s) inhaled 2 times a day   carvedilol 3.125 mg oral tablet: 1 tab(s) orally every 12 hours MDD:2  cloNIDine 0.2 mg oral tablet: 1 tab(s) orally 2 times a day  diclofenac 1% topical gel: Apply topically to affected area 3 times a day  doxazosin 4 mg oral tablet: 1 tab(s) orally 2 times a day  Eliquis 5 mg oral tablet: 1 tab(s) orally 2 times a day  famotidine 20 mg oral tablet: 1 tab(s) orally once a day  folic acid 1 mg oral tablet: 1 tab(s) orally once a day  furosemide 40 mg oral tablet: 1 tab(s) orally once a day MDD:1  hydrALAZINE 100 mg oral tablet: 1 tab(s) orally 3 times a day  isosorbide mononitrate 30 mg oral tablet, extended release: 3 tab(s) orally once a day  Total 90 mg daily by mouth  lactobacillus acidophilus oral capsule: 1 cap(s) orally 2 times a day  lamoTRIgine 100 mg oral tablet: 1 tab(s) orally once a day  metFORMIN 500 mg oral tablet: 1 tab(s) orally 2 times a day  oxybutynin 15 mg/24 hr oral tablet, extended release: 1 tab(s) orally once a day  venlafaxine 150 mg oral capsule, extended release: 1 cap(s) orally once a day  Vitamin B-12 1000 mcg oral tablet: 1 tab(s) orally once a day

## 2022-04-11 NOTE — PROGRESS NOTE ADULT - REASON FOR ADMISSION
GI bleed

## 2022-04-11 NOTE — DISCHARGE NOTE NURSING/CASE MANAGEMENT/SOCIAL WORK - NSDCPEFALRISK_GEN_ALL_CORE
For information on Fall & Injury Prevention, visit: [email or web address] OR  [email or web address] OR  https://www.cdc.gov/steadi/patient.html

## 2022-04-11 NOTE — PROGRESS NOTE ADULT - ASSESSMENT
77 year old male with past medical history of A fib (on Eliquis), incomplete RBBB CAD s/p stents, CHF, HLD, HTN, CKD stage 3, DM2 GIB s/p PRBC transfusion 10/21, anxiety/depression,  recently admitted  for acute hypoxic respiratory failure 2/2 covid-19 pneumonia 1/3-1/18 then readmitted 1/23 with acute on chronic diastolic chf and readmitted 2/22 for chest pain now presenting with rectal bleeding. Reports two day history of rectal bleeding.    CAD s/p stent/Paroxysmal Afib/GIB  - EKG showed SR with 1st degree AVB incomplete RBBB.  No anginal symptoms  - s/p colonoscopy.  Found to have old blood noted in the left colon, multiple diverticulum from proximal transverse to sigmoid  - now s/p bleeding scan with jejunal bleed, and transferred to  for further management.  - Capsule endoscopy revealed possible bleeding site after 93% transit time, favored mgmt is serial cbc and iron suppl  -  continue to monitor hb and transfuse to keep Hgb~8  - holding eliquis  - BP and HR stable, and has been more on the hypertensive side  - continue on home antihypertensives, amlodipine 10, coreg 6.25 bid, clonidine 0.2 bid, hydralazine 100 tid, imdur 90  - cont statin for cad history and remain off asa for now.  resume asap  - TTE (1/6/ 2022) EF 55%, moderate LVH, mild MR, mild AS, mild TR  - No evidence of volume overload.  Remains on lasix at a lower dose, 60 mg po daily.  -creat is worsening day by day and would favor stopping lasix for now  - Monitor and replete electrolytes. Keep K>4.0 and Mg>2.0.  - All other medical needs as per primary team.  - Other cardiovascular workup will depend on clinical course.  - Will continue to follow.
77M with pmh of atrial fibrillation (eliquis), incomplete RBBB CAD s/p stents, diastolic heart failure, HLD, HTN, CKD stage 3b, DM, GIB s/p PRBC transfusion 10/21, anxiety/depression, presenting with rectal bleeding. Was admitted to [facility], transfused 4-5U PRBC, had a colonoscopy w/poor prep but showed diverticulosis and old blood, had another bleed, had NM scan which showed a possible jejunal bleed, and was transferred to [facility] for further care. Patient's last bloody BM was yesterday. Denies dizziness, chest pain, shortness of breath. Having intermittent LLQ pain. 
LGIB    s/p blood transfusion  s/p colonoscopy  now with rebleed  NM bleeding scan positive for jejunal bleed   capsule completed, results noted; One small spot of blood at 93 percent of small bowel capsule transient time. No other pathology appreciated. No overt bleeding seen.  Periodic CBC and iron supplementation as needed   Avoid NSAIDs.  diet as tolerated  monitor for further bleeding  cont to hold ac  dw patient      I reviewed the overnight course of events on the unit, re-confirming the patient history. I discussed the care with the patient and their family  Differential diagnosis and plan of care discussed with patient after the evaluation  35 minutes spent on total encounter of which more than fifty percent of the encounter was spent counseling and/or coordinating care by the attending physician.  Advanced care planning was discussed with patient and family.  Advanced care planning forms were reviewed and discussed.  Risks, benefits and alternatives of gastroenterologic procedures were discussed in detail and all questions were answered.  
LGIB    s/p blood transfusion  s/p colonoscopy  now with rebleed  NM bleeding scan positive for jejunal bleed   capsule completed, results noted; One small spot of blood at 93 percent of small bowel capsule transient time. No other pathology appreciated. No overt bleeding seen.  Periodic CBC and iron supplementation as needed   Avoid NSAIDs.  diet as tolerated  ok to resume AC, while closely monitoring for further bleeding   dw patient      I reviewed the overnight course of events on the unit, re-confirming the patient history. I discussed the care with the patient and their family  Differential diagnosis and plan of care discussed with patient after the evaluation  35 minutes spent on total encounter of which more than fifty percent of the encounter was spent counseling and/or coordinating care by the attending physician.  Advanced care planning was discussed with patient and family.  Advanced care planning forms were reviewed and discussed.  Risks, benefits and alternatives of gastroenterologic procedures were discussed in detail and all questions were answered.  
 77 year old male with past medical history of A fib (on Eliquis), incomplete RBBB CAD s/p stents, CHF, HLD, HTN, CKD stage 3, DM2 GIB s/p PRBC transfusion 10/21, anxiety/depression,  recently admitted  for acute hypoxic respiratory failure 2/2 covid-19 pneumonia 1/3-1/18 then readmitted 1/23 with acute on chronic diastolic chf and readmitted 2/22 for chest pain now presenting with rectal bleeding. Reports two day history of rectal bleeding.    CAD s/p stent/Paroxysmal Afib/GIB  - EKG showed SR with 1st degree AVB incomplete RBBB.  No anginal symptoms  - s/p colonoscopy.  Found to have old blood noted in the left colon, multiple diverticulum from proximal transverse to sigmoid  - now s/p bleeding scan with jejunal bleed, and transferred to  for further management.  - Capsule dropped yesterday.  GI to retrieve today  -  continue to monitor hb and transfuse to keep Hgb~8  - holding eliquis  - BP and HR stable, and has been more on the hypertensive side  - continue on home antihypertensives, amlodipine 10, coreg 6.25 bid, clonidine 0.2 bid, hydralazine 100 tid, imdur 90  - cont statin for cad history and remain off asa for now.  resume asap  - TTE (1/6/ 2022) EF 55%, moderate LVH, mild MR, mild AS, mild TR  - No evidence of volume overload.  Restarted lasix at a lower dose, 60 mg po daily.  - Monitor and replete electrolytes. Keep K>4.0 and Mg>2.0.  - All other medical needs as per primary team.  - Other cardiovascular workup will depend on clinical course.  - Will continue to follow.  
77 year old male with past medical history of A fib (on Eliquis), incomplete RBBB CAD s/p stents, CHF, HLD, HTN, CKD stage 3, DM2 GIB s/p PRBC transfusion 10/21, anxiety/depression,  recently admitted  for acute hypoxic respiratory failure 2/2 covid-19 pneumonia 1/3-1/18 then readmitted 1/23 with acute on chronic diastolic chf and readmitted 2/22 for chest pain now presenting with rectal bleeding. Reports two day history of rectal bleeding.    CAD s/p stent/Paroxysmal Afib/GIB  - EKG showed SR with 1st degree AVB incomplete RBBB.  No anginal symptoms  - s/p colonoscopy.  Found to have old blood noted in the left colon, multiple diverticulum from proximal transverse to sigmoid  - now s/p bleeding scan with jejunal bleed, and transferred to  for further management.  - Capsule endoscopy revealed possible bleeding site after 93% transit time, favored mgmt is serial cbc and iron suppl  - eliquis resumed.   - continue to monitor hb and transfuse to keep Hgb~8    - BP and HR stable, and has been more on the hypertensive side  - continue on home antihypertensives, amlodipine 10, coreg 6.25 bid, clonidine 0.2 bid, hydralazine 100 tid, imdur 90  - cont statin for cad history and remain off asa for now.  resume when gurjit  - TTE (1/6/ 2022) EF 55%, moderate LVH, mild MR, mild AS    - No evidence of volume overload.   - cr improved off of Lasix  - renal fu    - Monitor and replete electrolytes. Keep K>4.0 and Mg>2.0.  - All other medical needs as per primary team.  - Other cardiovascular workup will depend on clinical course.  - Will continue to follow.  - DC planning per primary team.   
LGIB    s/p blood transfusion  s/p colonoscopy  now with rebleed  NM bleeding scan positive for jejunal bleed   capsule completed, results noted; One small spot of blood at 93 percent of small bowel capsule transient time. No other pathology appreciated. No overt bleeding seen.  Monitor CBC while Ac resumed       I reviewed the overnight course of events on the unit, re-confirming the patient history. I discussed the care with the patient and their family  Differential diagnosis and plan of care discussed with patient after the evaluation  35 minutes spent on total encounter of which more than fifty percent of the encounter was spent counseling and/or coordinating care by the attending physician.  Advanced care planning was discussed with patient and family.  Advanced care planning forms were reviewed and discussed.  Risks, benefits and alternatives of gastroenterologic procedures were discussed in detail and all questions were answered.  
LGIB    s/p blood transfusion  s/p colonoscopy  now with rebleed  NM bleeding scan positive for jejunal bleed   capsule completed, results noted; One small spot of blood at 93 percent of small bowel capsule transient time. No other pathology appreciated. No overt bleeding seen.  Monitor CBC while Ac resumed       I reviewed the overnight course of events on the unit, re-confirming the patient history. I discussed the care with the patient and their family  Differential diagnosis and plan of care discussed with patient after the evaluation  35 minutes spent on total encounter of which more than fifty percent of the encounter was spent counseling and/or coordinating care by the attending physician.  Advanced care planning was discussed with patient and family.  Advanced care planning forms were reviewed and discussed.  Risks, benefits and alternatives of gastroenterologic procedures were discussed in detail and all questions were answered.  
77 year old male with past medical history of A fib (on Eliquis), incomplete RBBB CAD s/p stents, CHF, HLD, HTN, CKD stage 3, DM2 GIB s/p PRBC transfusion 10/21, anxiety/depression,  recently admitted  for acute hypoxic respiratory failure 2/2 covid-19 pneumonia 1/3-1/18 then readmitted 1/23 with acute on chronic diastolic chf and readmitted 2/22 for chest pain now presenting with rectal bleeding. Reports two day history of rectal bleeding.    CAD s/p stent/Paroxysmal Afib/GIB  - EKG showed SR with 1st degree AVB incomplete RBBB.  No anginal symptoms  - s/p colonoscopy.  Found to have old blood noted in the left colon, multiple diverticulum from proximal transverse to sigmoid  - now s/p bleeding scan with jejunal bleed, and transferred to  for further management.  - Capsule endoscopy revealed possible bleeding site after 93% transit time, favored mgmt is serial cbc and iron suppl  - continue to monitor hb and transfuse to keep Hgb~8  - holding eliquis  - BP and HR stable, and has been more on the hypertensive side  - continue on home antihypertensives, amlodipine 10, coreg 6.25 bid, clonidine 0.2 bid, hydralazine 100 tid, imdur 90  - cont statin for cad history and remain off asa for now.  resume when gurjit  - TTE (1/6/ 2022) EF 55%, moderate LVH, mild MR, mild AS  - No evidence of volume overload.   - cr improved off of Lasix  - Monitor and replete electrolytes. Keep K>4.0 and Mg>2.0.  - All other medical needs as per primary team.  - Other cardiovascular workup will depend on clinical course.  - Will continue to follow.  - DC planning per primary team.   
77M with pmh of atrial fibrillation (eliquis), incomplete RBBB CAD s/p stents, diastolic heart failure, HLD, HTN, CKD stage 3b, DM, GIB s/p PRBC transfusion 10/21, anxiety/depression, presenting with rectal bleeding. Was admitted to [facility], transfused 4-5U PRBC, had a colonoscopy w/poor prep but showed diverticulosis and old blood, had another bleed, had NM scan which showed a possible jejunal bleed, and was transferred to [facility] for further care. Patient's last bloody BM was yesterday. Denies dizziness, chest pain, shortness of breath. Having intermittent LLQ pain. 
LGIB    s/p blood transfusion  s/p colonoscopy  now with rebleed  NM bleeding scan positive for jejunal bleed   capsule completed, results noted; One small spot of blood at 93 percent of small bowel capsule transient time. No other pathology appreciated. No overt bleeding seen.  Monitor CBC while Ac resumed   no GI objection to dc planning  dw patient       I reviewed the overnight course of events on the unit, re-confirming the patient history. I discussed the care with the patient and their family  Differential diagnosis and plan of care discussed with patient after the evaluation  35 minutes spent on total encounter of which more than fifty percent of the encounter was spent counseling and/or coordinating care by the attending physician.  Advanced care planning was discussed with patient and family.  Advanced care planning forms were reviewed and discussed.  Risks, benefits and alternatives of gastroenterologic procedures were discussed in detail and all questions were answered.  
LGIB    s/p blood transfusion  s/p colonoscopy  now with rebleed  NM bleeding scan positive for jejunal bleed   capsule completed, results noted; One small spot of blood at 93 percent of small bowel capsule transient time. No other pathology appreciated. No overt bleeding seen.  Periodic CBC and iron supplementation as needed   Avoid NSAIDs.  diet as tolerated  ok to resume AC tomorrow, while closely monitoring for further bleeding   dw patient      I reviewed the overnight course of events on the unit, re-confirming the patient history. I discussed the care with the patient and their family  Differential diagnosis and plan of care discussed with patient after the evaluation  35 minutes spent on total encounter of which more than fifty percent of the encounter was spent counseling and/or coordinating care by the attending physician.  Advanced care planning was discussed with patient and family.  Advanced care planning forms were reviewed and discussed.  Risks, benefits and alternatives of gastroenterologic procedures were discussed in detail and all questions were answered.  
77 year old male with past medical history of A fib (on Eliquis), incomplete RBBB CAD s/p stents, CHF, HLD, HTN, CKD stage 3, DM2 GIB s/p PRBC transfusion 10/21, anxiety/depression,  recently admitted  for acute hypoxic respiratory failure 2/2 covid-19 pneumonia 1/3-1/18 then readmitted 1/23 with acute on chronic diastolic chf and readmitted 2/22 for chest pain now presenting with rectal bleeding. Reports two day history of rectal bleeding.    CAD s/p stent/Paroxysmal Afib/GIB  - EKG showed SR with 1st degree AVB incomplete RBBB.  No anginal symptoms  - s/p colonoscopy.  Found to have old blood noted in the left colon, multiple diverticulum from proximal transverse to sigmoid  - now s/p bleeding scan with jejunal bleed, and transferred to  for further management.  - Capsule endoscopy revealed possible bleeding site after 93% transit time, favored mgmt is serial cbc and iron suppl  - continue to monitor hb and transfuse to keep Hgb~8  - holding eliquis  - BP and HR stable, and has been more on the hypertensive side  - continue on home antihypertensives, amlodipine 10, coreg 6.25 bid, clonidine 0.2 bid, hydralazine 100 tid, imdur 90  - cont statin for cad history and remain off asa for now.  resume when gurjit  - TTE (1/6/ 2022) EF 55%, moderate LVH, mild MR, mild AS  - No evidence of volume overload. creat has been worsening, and would keep off lasix. Renal eval appreciated  - Monitor and replete electrolytes. Keep K>4.0 and Mg>2.0.  - All other medical needs as per primary team.  - Other cardiovascular workup will depend on clinical course.  - Will continue to follow.  - dc planning once creatinine trend favorable  
77 year old male with past medical history of A fib (on Eliquis), incomplete RBBB CAD s/p stents, CHF, HLD, HTN, CKD stage 3, DM2 GIB s/p PRBC transfusion 10/21, anxiety/depression,  recently admitted  for acute hypoxic respiratory failure 2/2 covid-19 pneumonia 1/3-1/18 then readmitted 1/23 with acute on chronic diastolic chf and readmitted 2/22 for chest pain now presenting with rectal bleeding. Reports two day history of rectal bleeding.    CAD s/p stent/Paroxysmal Afib/GIB  - EKG showed SR with 1st degree AVB incomplete RBBB.  No anginal symptoms  - s/p colonoscopy.  Found to have old blood noted in the left colon, multiple diverticulum from proximal transverse to sigmoid  - now s/p bleeding scan with jejunal bleed, and transferred to  for further management.  - Capsule endoscopy revealed possible bleeding site after 93% transit time, favored mgmt is serial cbc and iron suppl  - eliquis resumed.   - continue to monitor hb and transfuse to keep Hgb~8    - BP and HR stable, and has been more on the hypertensive side  - continue on home antihypertensives, amlodipine 10, coreg, clonidine, hydralazine 100 tid, imdur 90  - can increase clonidine  - cont statin for cad history and remain off asa for now.  resume when gurjit  - TTE (1/6/ 2022) EF 55%, moderate LVH, mild MR, mild AS    - No evidence of volume overload.   - cr improved off of Lasix  - renal fu. resume diuretics when ok wtih renal.     - Monitor and replete electrolytes. Keep K>4.0 and Mg>2.0.  - All other medical needs as per primary team.  - Other cardiovascular workup will depend on clinical course.  - Will continue to follow.  - DC planning per primary team.   
LGIB    s/p blood transfusion  s/p colonoscopy  now with rebleed  NM bleeding scan positive for jejunal bleed   will need capsule endoscopy today  will get consent and place in paper chart  will contact GI fellows to place capsule  monitor cbc  NPO  hold AC  dw patient      I reviewed the overnight course of events on the unit, re-confirming the patient history. I discussed the care with the patient and their family  Differential diagnosis and plan of care discussed with patient after the evaluation  35 minutes spent on total encounter of which more than fifty percent of the encounter was spent counseling and/or coordinating care by the attending physician.  Advanced care planning was discussed with patient and family.  Advanced care planning forms were reviewed and discussed.  Risks, benefits and alternatives of gastroenterologic procedures were discussed in detail and all questions were answered.  
77M with pmh of atrial fibrillation (eliquis), incomplete RBBB CAD s/p stents, diastolic heart failure, HLD, HTN, CKD stage 3b, DM, GIB s/p PRBC transfusion 10/21, anxiety/depression, presenting with rectal bleeding. Was admitted to [facility], transfused 4-5U PRBC, had a colonoscopy w/poor prep but showed diverticulosis and old blood, had another bleed, had NM scan which showed a possible jejunal bleed, and was transferred to [facility] for further care. Patient's last bloody BM was yesterday. Denies dizziness, chest pain, shortness of breath. Having intermittent LLQ pain. 
77M with pmh of atrial fibrillation (eliquis), incomplete RBBB CAD s/p stents, diastolic heart failure, HLD, HTN, CKD stage 3b, DM, GIB s/p PRBC transfusion 10/21, anxiety/depression, presenting with rectal bleeding. Was admitted to [facility], transfused 4-5U PRBC, had a colonoscopy w/poor prep but showed diverticulosis and old blood, had another bleed, had NM scan which showed a possible jejunal bleed, and was transferred to [facility] for further care. Patient's last bloody BM was yesterday. Denies dizziness, chest pain, shortness of breath. Having intermittent LLQ pain. 
77M with pmh of atrial fibrillation (eliquis), incomplete RBBB CAD s/p stents, diastolic heart failure, HLD, HTN, CKD stage 3b, DM, GIB s/p PRBC transfusion 10/21, anxiety/depression, presenting with rectal bleeding. Was admitted to [facility], transfused 4-5U PRBC, had a colonoscopy w/poor prep but showed diverticulosis and old blood, had another bleed, had NM scan which showed a possible jejunal bleed, and was transferred to [facility] for further care. Patient's last bloody BM was yesterday. Denies dizziness, chest pain, shortness of breath. Having intermittent LLQ pain.

## 2022-04-11 NOTE — DISCHARGE NOTE PROVIDER - NSDCCPCAREPLAN_GEN_ALL_CORE_FT
PRINCIPAL DISCHARGE DIAGNOSIS  Diagnosis: GI bleed  Assessment and Plan of Treatment: There are 2 common types of GI Bleed, Upper GI Bleed and Lower GI Bleed.  Upper GI Bleed affects the esophagus, stomach, and first part of the small intestine. Lower GI Bleed affects the colon and rectum.  Upper GI Bleed signs and symptoms to notify your Health Care Provider are vomiting blood, or coffee ground vomitus, and bowel movements that look like black tar.  Lower GI Bleed signs and symptoms to notify your health care provider are bright red bloody bowel movements.   Take your medications as prescribed by your Gastroenterologist.  If you have had an Endoscopy or Colonoscopy, follow up with your Gastroenterologist for Pathology results.  Avoid NSAIDs unless your Health Care Provider tells you that it is ok (Aspirin, Ibuprofen, Advil, Motrin, Aleve).  Follow up with your Gastroenterologist within 1-2 weeks of discharge.        SECONDARY DISCHARGE DIAGNOSES  Diagnosis: Atrial fibrillation  Assessment and Plan of Treatment: Atrial fibrillation is the most common heart rhythm problem.  The condition puts you at risk for has stroke and heart attack  It helps if you control your blood pressure, not drink more than 1-2 alcohol drinks per day, cut down on caffeine, getting treatment for over active thyroid gland, and get regular exercise  Call your doctor if you feel your heart racing or beating unusually, chest tightness or pain, lightheaded, faint, shortness of breath especially with exercise  It is important to take your heart medication as prescribed  You may be on anticoagulation which is very important to take as directed - you may need blood work to monitor drug levels

## 2022-04-11 NOTE — DISCHARGE NOTE NURSING/CASE MANAGEMENT/SOCIAL WORK - NSDCVIVACCINE_GEN_ALL_CORE_FT
Tdap; 24-Jul-2020 09:31; Valdez Watson (RN); [employer]; [id number] (Exp. Date: 17-Sep-2021); IntraMuscular; Deltoid Left.; 0.5 milliLiter(s); VIS (VIS Published: 09-May-2013, VIS Presented: 24-Jul-2020);

## 2022-04-11 NOTE — PROGRESS NOTE ADULT - PROVIDER SPECIALTY LIST ADULT
Cardiology
Cardiology
Nephrology
Cardiology
Cardiology
Gastroenterology
Internal Medicine
Nephrology
Nephrology
Cardiology
Gastroenterology
Gastroenterology
Nephrology
Cardiology
Gastroenterology
Gastroenterology
Internal Medicine

## 2022-04-11 NOTE — DISCHARGE NOTE PROVIDER - PROVIDER TOKENS
PROVIDER:[TOKEN:[[id number]],FOLLOWUP:[2 weeks],ESTABLISHEDPATIENT:[T]] PROVIDER:[TOKEN:[[id number]],FOLLOWUP:[2 weeks],ESTABLISHEDPATIENT:[T]],PROVIDER:[TOKEN:[[id number]],FOLLOWUP:[1 week],ESTABLISHEDPATIENT:[T]]

## 2022-04-11 NOTE — DISCHARGE NOTE PROVIDER - NSDCFUADDAPPT_GEN_ALL_CORE_FT
Must follow up with Cardiology and Nephrology re: lasix to prevent fluid overload.   Must check daily standing weights and record on a calender.  Contact Cardiology or Renal if weight gain is greater than 3-5 lbs.

## 2022-04-11 NOTE — DISCHARGE NOTE NURSING/CASE MANAGEMENT/SOCIAL WORK - PATIENT PORTAL LINK FT
You can access the FollowMyHealth Patient Portal offered by [facility] by registering at the following website: [email or web address]. By joining [employer]’s FollowMyHealth portal, you will also be able to view your health information using other applications (apps) compatible with our system.

## 2022-04-11 NOTE — DISCHARGE NOTE PROVIDER - CARE PROVIDERS DIRECT ADDRESSES
[email or web address]

## 2022-04-13 ENCOUNTER — INPATIENT (INPATIENT)
Facility: HOSPITAL | Age: 77
LOS: 5 days | Discharge: NURSING, REHAB OR LONG-TERM CARE | DRG: 813 | End: 2022-04-19
Attending: INTERNAL MEDICINE | Admitting: INTERNAL MEDICINE
Payer: MEDICARE

## 2022-04-13 ENCOUNTER — RESULT REVIEW (OUTPATIENT)
Age: 77
End: 2022-04-13

## 2022-04-13 VITALS
HEART RATE: 61 BPM | DIASTOLIC BLOOD PRESSURE: 71 MMHG | OXYGEN SATURATION: 98 % | SYSTOLIC BLOOD PRESSURE: 106 MMHG | RESPIRATION RATE: 18 BRPM | WEIGHT: 223.11 LBS | HEIGHT: 68 IN | TEMPERATURE: 97 F

## 2022-04-13 DIAGNOSIS — Z98.89 OTHER SPECIFIED POSTPROCEDURAL STATES: Chronic | ICD-10-CM

## 2022-04-13 DIAGNOSIS — E11.9 TYPE 2 DIABETES MELLITUS WITHOUT COMPLICATIONS: ICD-10-CM

## 2022-04-13 DIAGNOSIS — I50.9 HEART FAILURE, UNSPECIFIED: ICD-10-CM

## 2022-04-13 DIAGNOSIS — I66.9 OCCLUSION AND STENOSIS OF UNSPECIFIED CEREBRAL ARTERY: Chronic | ICD-10-CM

## 2022-04-13 DIAGNOSIS — R31.9 HEMATURIA, UNSPECIFIED: ICD-10-CM

## 2022-04-13 DIAGNOSIS — Z29.9 ENCOUNTER FOR PROPHYLACTIC MEASURES, UNSPECIFIED: ICD-10-CM

## 2022-04-13 DIAGNOSIS — N40.0 BENIGN PROSTATIC HYPERPLASIA WITHOUT LOWER URINARY TRACT SYMPTOMS: ICD-10-CM

## 2022-04-13 DIAGNOSIS — Z98.49 CATARACT EXTRACTION STATUS, UNSPECIFIED EYE: Chronic | ICD-10-CM

## 2022-04-13 DIAGNOSIS — K57.90 DIVERTICULOSIS OF INTESTINE, PART UNSPECIFIED, WITHOUT PERFORATION OR ABSCESS WITHOUT BLEEDING: ICD-10-CM

## 2022-04-13 DIAGNOSIS — I10 ESSENTIAL (PRIMARY) HYPERTENSION: ICD-10-CM

## 2022-04-13 DIAGNOSIS — F41.9 ANXIETY DISORDER, UNSPECIFIED: ICD-10-CM

## 2022-04-13 DIAGNOSIS — K92.2 GASTROINTESTINAL HEMORRHAGE, UNSPECIFIED: ICD-10-CM

## 2022-04-13 DIAGNOSIS — D64.9 ANEMIA, UNSPECIFIED: ICD-10-CM

## 2022-04-13 DIAGNOSIS — N18.9 CHRONIC KIDNEY DISEASE, UNSPECIFIED: ICD-10-CM

## 2022-04-13 DIAGNOSIS — N18.30 CHRONIC KIDNEY DISEASE, STAGE 3 UNSPECIFIED: ICD-10-CM

## 2022-04-13 DIAGNOSIS — N44.00 TORSION OF TESTIS, UNSPECIFIED: Chronic | ICD-10-CM

## 2022-04-13 DIAGNOSIS — I48.91 UNSPECIFIED ATRIAL FIBRILLATION: ICD-10-CM

## 2022-04-13 DIAGNOSIS — I25.10 ATHEROSCLEROTIC HEART DISEASE OF NATIVE CORONARY ARTERY WITHOUT ANGINA PECTORIS: ICD-10-CM

## 2022-04-13 DIAGNOSIS — L05.91 PILONIDAL CYST WITHOUT ABSCESS: Chronic | ICD-10-CM

## 2022-04-13 DIAGNOSIS — E78.5 HYPERLIPIDEMIA, UNSPECIFIED: ICD-10-CM

## 2022-04-13 LAB
ALBUMIN SERPL ELPH-MCNC: 3.3 G/DL — SIGNIFICANT CHANGE UP (ref 3.3–5)
ALP SERPL-CCNC: 73 U/L — SIGNIFICANT CHANGE UP (ref 40–120)
ALT FLD-CCNC: 23 U/L — SIGNIFICANT CHANGE UP (ref 12–78)
ANION GAP SERPL CALC-SCNC: 10 MMOL/L — SIGNIFICANT CHANGE UP (ref 5–17)
APTT BLD: 45.8 SEC — HIGH (ref 27.5–35.5)
AST SERPL-CCNC: 18 U/L — SIGNIFICANT CHANGE UP (ref 15–37)
BASOPHILS # BLD AUTO: 0.03 K/UL — SIGNIFICANT CHANGE UP (ref 0–0.2)
BASOPHILS NFR BLD AUTO: 0.5 % — SIGNIFICANT CHANGE UP (ref 0–2)
BILIRUB SERPL-MCNC: 0.3 MG/DL — SIGNIFICANT CHANGE UP (ref 0.2–1.2)
BUN SERPL-MCNC: 31 MG/DL — HIGH (ref 7–23)
CALCIUM SERPL-MCNC: 9.6 MG/DL — SIGNIFICANT CHANGE UP (ref 8.5–10.1)
CHLORIDE SERPL-SCNC: 107 MMOL/L — SIGNIFICANT CHANGE UP (ref 96–108)
CO2 SERPL-SCNC: 22 MMOL/L — SIGNIFICANT CHANGE UP (ref 22–31)
CREAT SERPL-MCNC: 2.3 MG/DL — HIGH (ref 0.5–1.3)
EGFR: 29 ML/MIN/1.73M2 — LOW
EOSINOPHIL # BLD AUTO: 0.2 K/UL — SIGNIFICANT CHANGE UP (ref 0–0.5)
EOSINOPHIL NFR BLD AUTO: 3.4 % — SIGNIFICANT CHANGE UP (ref 0–6)
GLUCOSE SERPL-MCNC: 116 MG/DL — HIGH (ref 70–99)
HCT VFR BLD CALC: 28.1 % — LOW (ref 39–50)
HGB BLD-MCNC: 9.4 G/DL — LOW (ref 13–17)
IMM GRANULOCYTES NFR BLD AUTO: 0.2 % — SIGNIFICANT CHANGE UP (ref 0–1.5)
INR BLD: 1.84 RATIO — HIGH (ref 0.88–1.16)
LYMPHOCYTES # BLD AUTO: 1.2 K/UL — SIGNIFICANT CHANGE UP (ref 1–3.3)
LYMPHOCYTES # BLD AUTO: 20.3 % — SIGNIFICANT CHANGE UP (ref 13–44)
MCHC RBC-ENTMCNC: 29.1 PG — SIGNIFICANT CHANGE UP (ref 27–34)
MCHC RBC-ENTMCNC: 33.5 GM/DL — SIGNIFICANT CHANGE UP (ref 32–36)
MCV RBC AUTO: 87 FL — SIGNIFICANT CHANGE UP (ref 80–100)
MONOCYTES # BLD AUTO: 0.44 K/UL — SIGNIFICANT CHANGE UP (ref 0–0.9)
MONOCYTES NFR BLD AUTO: 7.4 % — SIGNIFICANT CHANGE UP (ref 2–14)
NEUTROPHILS # BLD AUTO: 4.03 K/UL — SIGNIFICANT CHANGE UP (ref 1.8–7.4)
NEUTROPHILS NFR BLD AUTO: 68.2 % — SIGNIFICANT CHANGE UP (ref 43–77)
NRBC # BLD: 0 /100 WBCS — SIGNIFICANT CHANGE UP (ref 0–0)
PLATELET # BLD AUTO: 259 K/UL — SIGNIFICANT CHANGE UP (ref 150–400)
POTASSIUM SERPL-MCNC: 3.9 MMOL/L — SIGNIFICANT CHANGE UP (ref 3.5–5.3)
POTASSIUM SERPL-SCNC: 3.9 MMOL/L — SIGNIFICANT CHANGE UP (ref 3.5–5.3)
PROT SERPL-MCNC: 6.5 G/DL — SIGNIFICANT CHANGE UP (ref 6–8.3)
PROTHROM AB SERPL-ACNC: 21.7 SEC — HIGH (ref 10.5–13.4)
RBC # BLD: 3.23 M/UL — LOW (ref 4.2–5.8)
RBC # FLD: 16.4 % — HIGH (ref 10.3–14.5)
SARS-COV-2 RNA SPEC QL NAA+PROBE: SIGNIFICANT CHANGE UP
SODIUM SERPL-SCNC: 139 MMOL/L — SIGNIFICANT CHANGE UP (ref 135–145)
WBC # BLD: 5.91 K/UL — SIGNIFICANT CHANGE UP (ref 3.8–10.5)
WBC # FLD AUTO: 5.91 K/UL — SIGNIFICANT CHANGE UP (ref 3.8–10.5)

## 2022-04-13 PROCEDURE — 99223 1ST HOSP IP/OBS HIGH 75: CPT

## 2022-04-13 PROCEDURE — 88108 CYTOPATH CONCENTRATE TECH: CPT | Mod: 26

## 2022-04-13 PROCEDURE — 99285 EMERGENCY DEPT VISIT HI MDM: CPT

## 2022-04-13 PROCEDURE — 88305 TISSUE EXAM BY PATHOLOGIST: CPT | Mod: 26

## 2022-04-13 RX ORDER — ONDANSETRON 8 MG/1
4 TABLET, FILM COATED ORAL EVERY 8 HOURS
Refills: 0 | Status: DISCONTINUED | OUTPATIENT
Start: 2022-04-13 | End: 2022-04-19

## 2022-04-13 RX ORDER — OXYBUTYNIN CHLORIDE 5 MG
15 TABLET ORAL DAILY
Refills: 0 | Status: DISCONTINUED | OUTPATIENT
Start: 2022-04-13 | End: 2022-04-13

## 2022-04-13 RX ORDER — DEXTROSE 50 % IN WATER 50 %
12.5 SYRINGE (ML) INTRAVENOUS ONCE
Refills: 0 | Status: DISCONTINUED | OUTPATIENT
Start: 2022-04-13 | End: 2022-04-19

## 2022-04-13 RX ORDER — FOLIC ACID 0.8 MG
1 TABLET ORAL DAILY
Refills: 0 | Status: DISCONTINUED | OUTPATIENT
Start: 2022-04-13 | End: 2022-04-19

## 2022-04-13 RX ORDER — FUROSEMIDE 40 MG
40 TABLET ORAL DAILY
Refills: 0 | Status: DISCONTINUED | OUTPATIENT
Start: 2022-04-13 | End: 2022-04-19

## 2022-04-13 RX ORDER — PREGABALIN 225 MG/1
1000 CAPSULE ORAL DAILY
Refills: 0 | Status: DISCONTINUED | OUTPATIENT
Start: 2022-04-13 | End: 2022-04-19

## 2022-04-13 RX ORDER — ASPIRIN/CALCIUM CARB/MAGNESIUM 324 MG
81 TABLET ORAL DAILY
Refills: 0 | Status: DISCONTINUED | OUTPATIENT
Start: 2022-04-13 | End: 2022-04-13

## 2022-04-13 RX ORDER — LAMOTRIGINE 25 MG/1
100 TABLET, ORALLY DISINTEGRATING ORAL DAILY
Refills: 0 | Status: DISCONTINUED | OUTPATIENT
Start: 2022-04-13 | End: 2022-04-19

## 2022-04-13 RX ORDER — SODIUM CHLORIDE 9 MG/ML
1000 INJECTION, SOLUTION INTRAVENOUS
Refills: 0 | Status: DISCONTINUED | OUTPATIENT
Start: 2022-04-13 | End: 2022-04-19

## 2022-04-13 RX ORDER — LACTOBACILLUS ACIDOPHILUS 100MM CELL
1 CAPSULE ORAL
Refills: 0 | Status: DISCONTINUED | OUTPATIENT
Start: 2022-04-13 | End: 2022-04-19

## 2022-04-13 RX ORDER — ACETAMINOPHEN 500 MG
650 TABLET ORAL EVERY 6 HOURS
Refills: 0 | Status: DISCONTINUED | OUTPATIENT
Start: 2022-04-13 | End: 2022-04-19

## 2022-04-13 RX ORDER — INSULIN LISPRO 100/ML
VIAL (ML) SUBCUTANEOUS
Refills: 0 | Status: DISCONTINUED | OUTPATIENT
Start: 2022-04-13 | End: 2022-04-19

## 2022-04-13 RX ORDER — FAMOTIDINE 10 MG/ML
20 INJECTION INTRAVENOUS DAILY
Refills: 0 | Status: DISCONTINUED | OUTPATIENT
Start: 2022-04-13 | End: 2022-04-13

## 2022-04-13 RX ORDER — ISOSORBIDE MONONITRATE 60 MG/1
30 TABLET, EXTENDED RELEASE ORAL DAILY
Refills: 0 | Status: DISCONTINUED | OUTPATIENT
Start: 2022-04-13 | End: 2022-04-18

## 2022-04-13 RX ORDER — AMLODIPINE BESYLATE 2.5 MG/1
10 TABLET ORAL DAILY
Refills: 0 | Status: DISCONTINUED | OUTPATIENT
Start: 2022-04-13 | End: 2022-04-19

## 2022-04-13 RX ORDER — VENLAFAXINE HCL 75 MG
150 CAPSULE, EXT RELEASE 24 HR ORAL DAILY
Refills: 0 | Status: DISCONTINUED | OUTPATIENT
Start: 2022-04-13 | End: 2022-04-19

## 2022-04-13 RX ORDER — DEXTROSE 50 % IN WATER 50 %
25 SYRINGE (ML) INTRAVENOUS ONCE
Refills: 0 | Status: DISCONTINUED | OUTPATIENT
Start: 2022-04-13 | End: 2022-04-19

## 2022-04-13 RX ORDER — INSULIN LISPRO 100/ML
VIAL (ML) SUBCUTANEOUS AT BEDTIME
Refills: 0 | Status: DISCONTINUED | OUTPATIENT
Start: 2022-04-13 | End: 2022-04-19

## 2022-04-13 RX ORDER — BUDESONIDE AND FORMOTEROL FUMARATE DIHYDRATE 160; 4.5 UG/1; UG/1
2 AEROSOL RESPIRATORY (INHALATION)
Refills: 0 | Status: DISCONTINUED | OUTPATIENT
Start: 2022-04-13 | End: 2022-04-19

## 2022-04-13 RX ORDER — CARVEDILOL PHOSPHATE 80 MG/1
3.12 CAPSULE, EXTENDED RELEASE ORAL EVERY 12 HOURS
Refills: 0 | Status: DISCONTINUED | OUTPATIENT
Start: 2022-04-13 | End: 2022-04-19

## 2022-04-13 RX ORDER — ISOSORBIDE MONONITRATE 60 MG/1
90 TABLET, EXTENDED RELEASE ORAL DAILY
Refills: 0 | Status: DISCONTINUED | OUTPATIENT
Start: 2022-04-13 | End: 2022-04-13

## 2022-04-13 RX ORDER — FAMOTIDINE 10 MG/ML
40 INJECTION INTRAVENOUS
Refills: 0 | Status: DISCONTINUED | OUTPATIENT
Start: 2022-04-13 | End: 2022-04-19

## 2022-04-13 RX ORDER — ATORVASTATIN CALCIUM 80 MG/1
10 TABLET, FILM COATED ORAL AT BEDTIME
Refills: 0 | Status: DISCONTINUED | OUTPATIENT
Start: 2022-04-13 | End: 2022-04-19

## 2022-04-13 RX ORDER — DEXTROSE 50 % IN WATER 50 %
15 SYRINGE (ML) INTRAVENOUS ONCE
Refills: 0 | Status: DISCONTINUED | OUTPATIENT
Start: 2022-04-13 | End: 2022-04-19

## 2022-04-13 RX ORDER — GLUCAGON INJECTION, SOLUTION 0.5 MG/.1ML
1 INJECTION, SOLUTION SUBCUTANEOUS ONCE
Refills: 0 | Status: DISCONTINUED | OUTPATIENT
Start: 2022-04-13 | End: 2022-04-19

## 2022-04-13 RX ORDER — BUDESONIDE AND FORMOTEROL FUMARATE DIHYDRATE 160; 4.5 UG/1; UG/1
2 AEROSOL RESPIRATORY (INHALATION)
Refills: 0 | Status: DISCONTINUED | OUTPATIENT
Start: 2022-04-13 | End: 2022-04-13

## 2022-04-13 RX ORDER — DOXAZOSIN MESYLATE 4 MG
4 TABLET ORAL
Refills: 0 | Status: DISCONTINUED | OUTPATIENT
Start: 2022-04-13 | End: 2022-04-19

## 2022-04-13 RX ORDER — ARIPIPRAZOLE 15 MG/1
1 TABLET ORAL
Qty: 0 | Refills: 0 | DISCHARGE

## 2022-04-13 RX ORDER — HYDRALAZINE HCL 50 MG
100 TABLET ORAL THREE TIMES A DAY
Refills: 0 | Status: DISCONTINUED | OUTPATIENT
Start: 2022-04-13 | End: 2022-04-19

## 2022-04-13 RX ORDER — PANTOPRAZOLE SODIUM 20 MG/1
40 TABLET, DELAYED RELEASE ORAL
Refills: 0 | Status: DISCONTINUED | OUTPATIENT
Start: 2022-04-13 | End: 2022-04-19

## 2022-04-13 RX ADMIN — Medication 1 TABLET(S): at 17:13

## 2022-04-13 RX ADMIN — Medication 1 MILLIGRAM(S): at 17:13

## 2022-04-13 RX ADMIN — FAMOTIDINE 40 MILLIGRAM(S): 10 INJECTION INTRAVENOUS at 17:12

## 2022-04-13 RX ADMIN — Medication 100 MILLIGRAM(S): at 15:32

## 2022-04-13 RX ADMIN — PREGABALIN 1000 MICROGRAM(S): 225 CAPSULE ORAL at 17:12

## 2022-04-13 RX ADMIN — Medication 0.2 MILLIGRAM(S): at 17:13

## 2022-04-13 RX ADMIN — ATORVASTATIN CALCIUM 10 MILLIGRAM(S): 80 TABLET, FILM COATED ORAL at 21:46

## 2022-04-13 RX ADMIN — AMLODIPINE BESYLATE 10 MILLIGRAM(S): 2.5 TABLET ORAL at 17:12

## 2022-04-13 RX ADMIN — Medication 100 MILLIGRAM(S): at 22:33

## 2022-04-13 RX ADMIN — Medication 4 MILLIGRAM(S): at 17:12

## 2022-04-13 RX ADMIN — ISOSORBIDE MONONITRATE 30 MILLIGRAM(S): 60 TABLET, EXTENDED RELEASE ORAL at 17:13

## 2022-04-13 NOTE — H&P ADULT - NSICDXPASTMEDICALHX_GEN_ALL_CORE_FT
PAST MEDICAL HISTORY:  Afib on AC    Anemia of chronic disease Monoclonal Gammopathy-MGUS  pRBC transfusion 10/15/21    Anxiety Depression  multiple psych medications    Atrial fibrillation first documented on EKG 10/7/2021    CAD (coronary artery disease) s/p stents (not on plavix)    Chronic diastolic congestive heart failure     Diverticulosis c/b GIB in 2020    History of diverticulitis 07/2021    HLD (hyperlipidemia)     HTN (hypertension) c/b multiple episodes of hypertensive urgency    Multiple thyroid nodules     Stage 3 chronic kidney disease     Type 2 diabetes mellitus not on home insulin/ Meds     PAST MEDICAL HISTORY:  Afib on AC    Anemia of chronic disease Monoclonal Gammopathy-MGUS  pRBC transfusion 10/15/21    Anxiety Depression  multiple psych medications    Atrial fibrillation first documented on EKG 10/7/2021    CAD (coronary artery disease) s/p stents (not on plavix)    Chronic diastolic congestive heart failure     Diverticulosis c/b GIB in 2020    H/O: upper GI bleed 4/22, small Bowel Bleed, 5 u pRBC Transfusion 4/22  S/P Colonoscopy- Diverticulosis  S/P Capsule Endoscopy done -[facility] -No active bleeding    History of diverticulitis 07/2021    HLD (hyperlipidemia)     HTN (hypertension) c/b multiple episodes of hypertensive urgency    Multiple thyroid nodules     Stage 3 chronic kidney disease     Type 2 diabetes mellitus not on home insulin/ Meds

## 2022-04-13 NOTE — H&P ADULT - PROBLEM SELECTOR PLAN 8
Chronic  - Continue home medications: Chronic, stable  Continue home Clonidine, Hydralazine, Coreg, Amlodipine and Venlafaxine with hold parameters  Hemodynamically stable   Dash Diet Chronic, stable  Continue home Clonidine, Hydralazine, Coreg, Amlodipine and  Lasix  with hold parameters  Hemodynamically stable   Dash Diet

## 2022-04-13 NOTE — H&P ADULT - NSHPSOCIALHISTORY_GEN_ALL_CORE
Tobacco: quit 45 years ago, former 10 pack years smoker   EtOH: denies  recreational drug use: denies  Lives with: alone  Ambulates:  independent  ADLs: independent  Occupation: retired  Vaccinations: Moderna x2

## 2022-04-13 NOTE — ED ADULT NURSE NOTE - BOWEL SOUNDS LLQ
Patient was seen in ER in 07/2019 for complaint of chest pain and had a cardiac workup and follow up with Dr. Izquierdo, Cardiologist. Patient had a nuclear stress test on 8/28/2019  1. (Left ventricular ejection fraction is hyperdynamic (Calculated EF > 70%). Impressions are consistent with a low risk study) and a cardiac cath on 9/6/2019: (Overall mild coronary artery disease with right dominant system.  Normal LV chamber size, wall motion and systolic function with an estimated LV ejection fraction of about 65 to 70%) Patient denies any new onset of symptoms or worsening of symptoms since office visit with Dr. Izquierdo on 9/19/2019.     Spoke with MEGHNA Escobar CRNA regarding above information. Per CRNA, patient okay for scheduled procedure on 10/15/2019; no additional orders.   present

## 2022-04-13 NOTE — CONSULT NOTE ADULT - SUBJECTIVE AND OBJECTIVE BOX
Patient is a 77y old  Male who presents with a chief complaint of     HPI:  77M with pmh of atrial fibrillation (on eliquis), incomplete RBBB CAD s/p stents, diastolic heart failure, HLD, HTN, CKD stage 3b, DM, anxiety/depression, GIB, recently admitted to [facility] and then transferred to [facility] for GIB, presents with hematuria. Pt says this morning he noticed blood from his penis, had 3 episodes of a lot of bleeding, most recent at 8 am. Pt says this never happened before. Discharged home from [facility] 2 day ago after being treated with GIB. Eliquis resumed upon discharge. No acute complaints at this time. Denies current chest pain, dyspnea, abdominal pain. States he sometimes feels dizzy upon standing.     PAST MEDICAL & SURGICAL HISTORY:  HTN (hypertension)  c/b multiple episodes of hypertensive urgency    HLD (hyperlipidemia)    Atrial fibrillation  first documented on EKG 10/7/2021    CAD (coronary artery disease)  s/p stents (not on plavix)    Type 2 diabetes mellitus  not on home insulin/ Meds    Anxiety  Depression  multiple psych medications    History of diverticulitis  07/2021    Diverticulosis  c/b GIB in 2020    Afib  on AC    Stage 3 chronic kidney disease    Anemia of chronic disease  Monoclonal Gammopathy-MGUS  pRBC transfusion 10/15/21    Chronic diastolic congestive heart failure    Multiple thyroid nodules    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  Had surgery ( April 2013 )    S/P tonsillectomy    S/P arthroscopic knee surgery  Bilateral ( 2005 )    Torsion of testicle  Had surgery at age 14    Pilonidal cyst  Had surgery ( 1969 )    S/P cataract surgery  Bilateral    H/O hernia repair              ECHO  FINDINGS:  < from: TTE Echo Complete w/o Contrast w/ Doppler (01.06.22 @ 10:52) >   EXAM:  ECHO TTE WO CON COMP W DOPP         PROCEDURE DATE:  01/06/2022        INTERPRETATION:  INDICATION: Dyspnea  Sonographer PH    Blood Pressure 177/86    Height 172.7 cm     Weight 104.4 kg    Dimensions:  LA 4.2       Normal Values: 2.0 - 4.0 cm  Ao 3.7        Normal Values: 2.0 - 3.8 cm  SEPTUM 1.4       Normal Values: 0.6 - 1.2 cm  PWT 1.4       Normal Values: 0.6 - 1.1 cm  LVIDd 5.2         Normal Values: 3.0 - 5.6 cm  LVIDs 4.2         Normal Values: 1.8 - 4.0 cm      OBSERVATIONS:  Technically difficult study  Mitral Valve: Mitral annular calcification with thickened leaflets, mild   MR.  Aortic Valve/Aorta: Calcified trileaflet aortic valve with decreased   opening. Peak transaortic valve gradient is 29.4 mmHg with a mean   transaortic valve gradient 14.7 mmHg. This consistent with mild aortic   stenosis.  Tricuspid Valve: Mild TR.  Pulmonic Valve: Not well-visualized  Left Atrium: Enlarged  Right Atrium: Not well-visualized  Left Ventricle: Moderate left ventricular hypertrophy with normal   systolic function, estimated LVEF of 55%.  Right Ventricle: Grossly normal size and systolic function.  Pericardium: no significant pericardial effusion.  Pulmonary/RV Pressure: estimated PA systolic pressure of 32mmHg        IMPRESSION:  Technically difficult study  Moderate left ventricular hypertrophy with normal systolic function,   estimated LVEF of 55%.  Grossly normal RV size and systolic function.  Calcified trileaflet aortic valve with mild aortic stenosis  Mild MR andTR.  No significant pericardial effusion.    --- End of Report ---              VIET GREENWOOD MD; Attending Cardiologist  This document has been electronically signed. Jan 7 2022 11:19AM    < end of copied text >      MEDICATIONS  (STANDING):    MEDICATIONS  (PRN):      FAMILY HISTORY:  FHx: throat cancer    No family history of colorectal cancer      Constitutional: denies fever, chills  HEENT: denies blurry vision, difficulty hearing  Respiratory: denies SOB, VALDEZ, cough  Cardiovascular: denies CP, palpitations, orthopnea, PND, LE edema  Gastrointestinal: denies nausea, vomiting, abdominal pain  Genitourinary: admits hematuria  Skin: Denies rashes, itching  Neurologic: intermittent dizziness, denies headache, weakness  Hematology/Oncology: admits bleeding, hematuria  ROS negative except as noted above      SOCIAL HISTORY:    Former smoker, quit 45 years ago, ~8 pack years     Vital Signs Last 24 Hrs  T(C): 36.2 (13 Apr 2022 07:50), Max: 36.2 (13 Apr 2022 07:50)  T(F): 97.2 (13 Apr 2022 07:50), Max: 97.2 (13 Apr 2022 07:50)  HR: 61 (13 Apr 2022 07:50) (61 - 61)  BP: 106/71 (13 Apr 2022 07:50) (106/71 - 106/71)  BP(mean): --  RR: 18 (13 Apr 2022 07:50) (18 - 18)  SpO2: 98% (13 Apr 2022 07:50) (98% - 98%)    Physical Exam:  General: Well developed, well nourished, NAD  HEENT: NCAT, PERRLA, EOMI bl, moist mucous membranes   Neck: Supple, nontender, no mass  Neurology: A&Ox3, nonfocal, sensation intact   Respiratory: CTA B/L, No W/R/R  CV: RRR, +S1/S2, no murmurs, rubs or gallops  Abdominal: Soft, NT, ND +BSx4, no palpable masses  Extremities: 1+ LE edema, + peripheral pulses  MSK: Normal ROM, no joint erythema or warmth, no joint swelling   Heme: No obvious ecchymosis or petechiae   Skin: warm, dry, normal color      ECG:   < from: 12 Lead ECG (04.13.22 @ 09:45) >    Ventricular Rate 52 BPM    Atrial Rate 52 BPM    P-R Interval 348 ms    QRS Duration 116 ms    Q-T Interval 496 ms    QTC Calculation(Bazett) 461 ms    P Axis 49 degrees    R Axis -11 degrees    T Axis 179 degrees    Diagnosis Line Sinus bradycardia with 1st degree AV block  Left ventricular hypertrophy with QRS widening and repolarization abnormality ( R in aVL , [place] product )  Abnormal ECG  When compared with ECG of 30-MAR-2022 13:01,  No significant change was found  Confirmed by Bonifacio BARRERA, Victorino ([id number]) on 4/13/2022 10:48:39 AM    < end of copied text >      I&O's Detail    13 Apr 2022 07:01  -  13 Apr 2022 12:16  --------------------------------------------------------  IN:  Total IN: 0 mL    OUT:    Voided (mL): 0 mL  Total OUT: 0 mL    Total NET: 0 mL          LABS:                        9.4    5.91  )-----------( 259      ( 13 Apr 2022 08:46 )             28.1     04-13    139  |  107  |  31<H>  ----------------------------<  116<H>  3.9   |  22  |  2.30<H>    Ca    9.6      13 Apr 2022 08:46    TPro  6.5  /  Alb  3.3  /  TBili  0.3  /  DBili  x   /  AST  18  /  ALT  23  /  AlkPhos  73  04-13        PT/INR - ( 13 Apr 2022 08:46 )   PT: 21.7 sec;   INR: 1.84 ratio         PTT - ( 13 Apr 2022 08:46 )  PTT:45.8 sec    I&O's Summary    13 Apr 2022 07:01  -  13 Apr 2022 12:16  --------------------------------------------------------  IN: 0 mL / OUT: 0 mL / NET: 0 mL      BNP  RADIOLOGY & ADDITIONAL STUDIES:
Patient is a 77y old  Male who presents with a chief complaint of Hematuria (13 Apr 2022 12:15)    HPI:  78yo M with PMH of Afib (on eliquis), MGUS, depression, CAD s/p stents, HLD, HTN, CKD, DM2, diverticulosis, CHF (Echo 1/22 LVEF 55%) and BPH presents to the ED with hematuria. Patient states he went to void at 3AM and noticed it was difficult for him to urinate at first, but when he was able to void, he saw a lot of mikayla blood. Patient had 5 episodes of mikayla blood without clots along with dysuria. Denies trauma, falls, penile lesion. Per chart review, patient was admitted to [facility] on 3/30/22 for a rectal bleed and was transfused 4-5 units of pRBC and transferred to [facility] for further workup after a bleeding scan showed a jejunal bleed. During this time, Eliquis was held. Capsule endoscopy revealed possible small bleeding site. Pt's Eliquis was resumed on 4/8 with no reoccurrence of a GIB. Pt last took Eliquis this AM.     Admits to HA, dizziness, nausea, decreased PO intake. Denies fever/chills, chest pain, palpitations, vomiting, constipation, diarrhea, hematochezia.     ED course:   Vitals:  BP:106/71      HR: 61     Temp: 97.2      RR:18     O2: 96%  Labs significant for: H/H 9.4/28.1, PT 21.7, INR 1.84, PTT 45.8, BUN/Cr 31/2.3, glucose 116  EKG: sinus bradycardia with 1st degree AV block, LVH with QRS widening and repolarization abnormality (13 Apr 2022 11:54)    Renal consult called for chronic kidney disease stage 4.       PAST MEDICAL HISTORY:  Hypertension    Diabetes mellitus    Lupus    Hepatitis C    Anxiety and depression    CAD (coronary artery disease)    Diverticulosis    Hyperlipidemia    HTN (hypertension)    HLD (hyperlipidemia)    Atrial fibrillation    CAD (coronary artery disease)    Type 2 diabetes mellitus    Anxiety    History of diverticulitis    Diverticulosis    Afib    Stage 3 chronic kidney disease    Anemia of chronic disease    Chronic diastolic congestive heart failure    Multiple thyroid nodules        PAST SURGICAL HISTORY:  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    S/P tonsillectomy    S/P arthroscopic knee surgery    Torsion of testicle    Pilonidal cyst    S/P cataract surgery    H/O hernia repair        FAMILY HISTORY:  FHx: throat cancer    No family history of colorectal cancer        SOCIAL HISTORY: No smoking or alcohol use     Allergies    No Known Allergies    Intolerances      Home Medications:  aspirin 81 mg oral tablet: 1 tab(s) orally once a day (13 Apr 2022 15:41)  budesonide-formoterol 80 mcg-4.5 mcg/inh inhalation aerosol: 2 puff(s) inhaled 2 times a day (13 Apr 2022 15:41)  cloNIDine 0.2 mg oral tablet: 1 tab(s) orally 2 times a day (13 Apr 2022 15:41)  diclofenac 1% topical gel: Apply topically to affected area 3 times a day (13 Apr 2022 15:41)  doxazosin 4 mg oral tablet: 1 tab(s) orally 2 times a day (13 Apr 2022 15:41)  Eliquis 5 mg oral tablet: 1 tab(s) orally 2 times a day (13 Apr 2022 15:41)  famotidine 40 mg oral tablet: 1 tab(s) orally 2 times a day (13 Apr 2022 15:41)  isosorbide mononitrate 30 mg oral tablet, extended release: 1 tab(s) orally once a day (in the morning) (13 Apr 2022 15:41)  lactobacillus acidophilus oral capsule: 1 cap(s) orally 2 times a day (13 Apr 2022 15:41)  lamoTRIgine 100 mg oral tablet: 1 tab(s) orally once a day (13 Apr 2022 15:41)  metFORMIN 500 mg oral tablet: 1 tab(s) orally 2 times a day (13 Apr 2022 15:41)  oxybutynin 15 mg/24 hr oral tablet, extended release: 1 tab(s) orally once a day (13 Apr 2022 15:41)  potassium chloride 20 mEq oral tablet, extended release: 1 tab(s) orally once a day (13 Apr 2022 15:41)  Vitamin B-12 1000 mcg oral tablet: 1 tab(s) orally once a day (13 Apr 2022 15:41)    MEDICATIONS  (STANDING):  amLODIPine   Tablet 10 milliGRAM(s) Oral daily  atorvastatin 10 milliGRAM(s) Oral at bedtime  budesonide  80 MICROgram(s)/formoterol 4.5 MICROgram(s) Inhaler 2 Puff(s) Inhalation two times a day  carvedilol 3.125 milliGRAM(s) Oral every 12 hours  cloNIDine 0.2 milliGRAM(s) Oral two times a day  cyanocobalamin 1000 MICROGram(s) Oral daily  dextrose 5%. 1000 milliLiter(s) (50 mL/Hr) IV Continuous <Continuous>  dextrose 5%. 1000 milliLiter(s) (100 mL/Hr) IV Continuous <Continuous>  dextrose 50% Injectable 25 Gram(s) IV Push once  dextrose 50% Injectable 12.5 Gram(s) IV Push once  dextrose 50% Injectable 25 Gram(s) IV Push once  doxazosin Oral Tab/Cap - Peds 4 milliGRAM(s) Oral two times a day  famotidine    Tablet 40 milliGRAM(s) Oral two times a day  folic acid 1 milliGRAM(s) Oral daily  furosemide    Tablet 40 milliGRAM(s) Oral daily  glucagon  Injectable 1 milliGRAM(s) IntraMuscular once  hydrALAZINE 100 milliGRAM(s) Oral three times a day  insulin lispro (ADMELOG) corrective regimen sliding scale   SubCutaneous three times a day before meals  insulin lispro (ADMELOG) corrective regimen sliding scale   SubCutaneous at bedtime  isosorbide   mononitrate ER Tablet (IMDUR) 30 milliGRAM(s) Oral daily  lactobacillus acidophilus 1 Tablet(s) Oral two times a day  lamoTRIgine 100 milliGRAM(s) Oral daily  oxybutynin 15 milliGRAM(s) Oral daily  pantoprazole    Tablet 40 milliGRAM(s) Oral before breakfast  venlafaxine XR. 150 milliGRAM(s) Oral daily    MEDICATIONS  (PRN):  acetaminophen     Tablet .. 650 milliGRAM(s) Oral every 6 hours PRN Temp greater or equal to 38C (100.4F), Mild Pain (1 - 3)  dextrose Oral Gel 15 Gram(s) Oral once PRN Blood Glucose LESS THAN 70 milliGRAM(s)/deciliter  ondansetron Injectable 4 milliGRAM(s) IV Push every 8 hours PRN Nausea and/or Vomiting      REVIEW OF SYSTEMS:  General: no distress  Respiratory: No cough, SOB  Cardiovascular: No CP or Palpitations	  Gastrointestinal: No nausea, Vomiting. No diarrhea  Genitourinary: + bloody urine  Musculoskeletal: No new rash or lesions		  all other systems negative    T(F): 97.8 (04-13-22 @ 15:33), Max: 98 (04-13-22 @ 14:43)  HR: 81 (04-13-22 @ 15:33) (61 - 81)  BP: 143/76 (04-13-22 @ 15:33) (106/71 - 143/76)  RR: 17 (04-13-22 @ 15:33) (16 - 18)  SpO2: 96% (04-13-22 @ 15:33) (96% - 98%)  Wt(kg): --    PHYSICAL EXAM:  General: NAD  Respiratory: b/l air entry  Cardiovascular: S1 S2  Gastrointestinal: soft  Extremities: no edema        04-13    139  |  107  |  31<H>  ----------------------------<  116<H>  3.9   |  22  |  2.30<H>    Ca    9.6      13 Apr 2022 08:46    TPro  6.5  /  Alb  3.3  /  TBili  0.3  /  DBili  x   /  AST  18  /  ALT  23  /  AlkPhos  73  04-13                          9.4    5.91  )-----------( 259      ( 13 Apr 2022 08:46 )             28.1       Blood Urea Nitrogen, Serum: 31 mg/dL (04-13 @ 08:46)  Calcium, Total Serum: 9.6 mg/dL (04-13 @ 08:46)  Potassium, Serum: 3.9 mmol/L (04-13 @ 08:46)  Hemoglobin: 9.4 g/dL (04-13 @ 08:46)      Creatinine, Serum: 2.30 (04-13 @ 08:46)  Creatinine, Serum: 1.55 (04-11 @ 06:05)        LIVER FUNCTIONS - ( 13 Apr 2022 08:46 )  Alb: 3.3 g/dL / Pro: 6.5 g/dL / ALK PHOS: 73 U/L / ALT: 23 U/L / AST: 18 U/L / GGT: x               I&O's Detail    13 Apr 2022 07:01  -  13 Apr 2022 15:56  --------------------------------------------------------  IN:  Total IN: 0 mL    OUT:    Voided (mL): 0 mL  Total OUT: 0 mL    Total NET: 0 mL            
Patient is a 77y old  Male who presents with a chief complaint of Hematuria (13 Apr 2022 15:55)      HISTORY OF PRESENT ILLNESS:  78 y/o male who lives in group home, with h/o psychiatric issues, A-fib on Eliquis, MGUS, had recent jejunal bleed that req'd multiple transfusions at [facility], just discharged 3 days ago.  Eliquis was restarted before d/c from hospital.  Gross hematuria and dysuria started today and pt came to the ER.  Denies other Sx.  Denies prior episodes of hematuria, and denies having had urology w/u in the past, although he is s/p right orchiectomy for torsion at age 14.  Quit smoking 1/2 ppd of cigarettes in the distant past.  Pt has chronic renal insufficiency, with stable renal indices, according to nephrology consult.  Prior CT and u/s shows renal cysts without solid renal masses.  INR=1.8, PTT also elevated at 45.8, last dose of Eliquis was this AM.    PAST MEDICAL & SURGICAL HISTORY:  HTN (hypertension)  c/b multiple episodes of hypertensive urgency    HLD (hyperlipidemia)    Atrial fibrillation  first documented on EKG 10/7/2021    CAD (coronary artery disease)  s/p stents, on Eliquis    Type 2 diabetes mellitus  not on home insulin/ Meds    Anxiety  Depression  multiple psych medications    History of diverticulitis  07/2021    Diverticulosis  c/b GIB in 2020    Afib  on AC    Stage 3 chronic kidney disease    Anemia of chronic disease  Monoclonal Gammopathy-MGUS  pRBC transfusion 10/15/21    Chronic diastolic congestive heart failure    Multiple thyroid nodules    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  Had surgery ( April 2013 )    S/P tonsillectomy    S/P arthroscopic knee surgery  Bilateral ( 2005 )    Torsion of testicle  Had surgery at age 14    Pilonidal cyst  Had surgery ( 1969 )    S/P cataract surgery  Bilateral    H/O hernia repair        REVIEW OF SYSTEMS:    CONSTITUTIONAL: No weakness, fevers or chills  SKIN: No itching, burning, rashes, or lesions   EYES/ENT: No visual changes;  No vertigo or throat pain   NECK: No pain or stiffness  RESPIRATORY: No cough, wheezing, hemoptysis; No shortness of breath  CARDIOVASCULAR: No chest pain or palpitations  GASTROINTESTINAL: No abdominal or epigastric pain. No nausea, vomiting, diarrhea  NEUROLOGICAL: No numbness or weakness  GENITOURINARY:  (+)hematuria, (+)dysuria    All other review of systems is negative unless indicated above.    MEDICATIONS  (STANDING):  amLODIPine   Tablet 10 milliGRAM(s) Oral daily  atorvastatin 10 milliGRAM(s) Oral at bedtime  budesonide  80 MICROgram(s)/formoterol 4.5 MICROgram(s) Inhaler 2 Puff(s) Inhalation two times a day  carvedilol 3.125 milliGRAM(s) Oral every 12 hours  cloNIDine 0.2 milliGRAM(s) Oral two times a day  cyanocobalamin 1000 MICROGram(s) Oral daily  dextrose 5%. 1000 milliLiter(s) (50 mL/Hr) IV Continuous <Continuous>  dextrose 5%. 1000 milliLiter(s) (100 mL/Hr) IV Continuous <Continuous>  dextrose 50% Injectable 25 Gram(s) IV Push once  dextrose 50% Injectable 12.5 Gram(s) IV Push once  dextrose 50% Injectable 25 Gram(s) IV Push once  doxazosin Oral Tab/Cap - Peds 4 milliGRAM(s) Oral two times a day  famotidine    Tablet 40 milliGRAM(s) Oral two times a day  folic acid 1 milliGRAM(s) Oral daily  furosemide    Tablet 40 milliGRAM(s) Oral daily  glucagon  Injectable 1 milliGRAM(s) IntraMuscular once  hydrALAZINE 100 milliGRAM(s) Oral three times a day  insulin lispro (ADMELOG) corrective regimen sliding scale   SubCutaneous three times a day before meals  insulin lispro (ADMELOG) corrective regimen sliding scale   SubCutaneous at bedtime  isosorbide   mononitrate ER Tablet (IMDUR) 30 milliGRAM(s) Oral daily  lactobacillus acidophilus 1 Tablet(s) Oral two times a day  lamoTRIgine 100 milliGRAM(s) Oral daily  oxybutynin 15 milliGRAM(s) Oral daily  pantoprazole    Tablet 40 milliGRAM(s) Oral before breakfast  venlafaxine XR. 150 milliGRAM(s) Oral daily    MEDICATIONS  (PRN):  acetaminophen     Tablet .. 650 milliGRAM(s) Oral every 6 hours PRN Temp greater or equal to 38C (100.4F), Mild Pain (1 - 3)  dextrose Oral Gel 15 Gram(s) Oral once PRN Blood Glucose LESS THAN 70 milliGRAM(s)/deciliter  ondansetron Injectable 4 milliGRAM(s) IV Push every 8 hours PRN Nausea and/or Vomiting      Allergies    No Known Allergies      SOCIAL HISTORY:   Smoking:  quit 45 yrs ago.  smoked 1/2 ppd x 24 yrs   Work:  formerly worked in advertising in [employer]      FAMILY HISTORY:  FHx: throat cancer    No family history of colorectal cancer        Vital Signs Last 24 Hrs  T(C): 36.6 (13 Apr 2022 16:08), Max: 36.7 (13 Apr 2022 14:43)  T(F): 97.9 (13 Apr 2022 16:08), Max: 98 (13 Apr 2022 14:43)  HR: 66 (13 Apr 2022 16:08) (61 - 81)  BP: 142/79 (13 Apr 2022 16:08) (106/71 - 143/76)  BP(mean): --  RR: 17 (13 Apr 2022 16:08) (16 - 18)  SpO2: 100% (13 Apr 2022 16:08) (96% - 100%)    PHYSICAL EXAM:    Constitutional: NAD, well-developed  HEENT: PERRLA, EOMI  Neck: Supple, full ROM  Back: No CVA tenderness  Respiratory: Normal repiratory effort  Abd: Soft, NT/ND  : Normal phallus, s/p right orchiectomy (for torsion age 14), normal left testis  Neurological: A&O x 3, no focal deficits  Psychiatric: Normal mood, flat affect  Skin: No rashes    LABS:                        9.4    5.91  )-----------( 259      ( 13 Apr 2022 08:46 )             28.1     04-13    139  |  107  |  31<H>  ----------------------------<  116<H>  3.9   |  22  |  2.30<H>    Ca    9.6      13 Apr 2022 08:46    TPro  6.5  /  Alb  3.3  /  TBili  0.3  /  DBili  x   /  AST  18  /  ALT  23  /  AlkPhos  73  04-13    PT/INR - ( 13 Apr 2022 08:46 )   PT: 21.7 sec;   INR: 1.84 ratio         PTT - ( 13 Apr 2022 08:46 )  PTT:45.8 sec    Urine Culture:       RADIOLOGY & ADDITIONAL STUDIES:      PROCEDURE DATE:  03/30/2022          INTERPRETATION:  CLINICAL INFORMATION: Rectal bleed and shortness of   breath    COMPARISON: CT chest 10/18/2021. CT chest abdomen pelvis 10/7/2021. Right   upper quadrant ultrasound 8/20/2021.    CONTRAST/COMPLICATIONS:  IV Contrast: NONE  Oral Contrast: NONE  Complications: None reported at time of study completion    PROCEDURE:  CT of the Chest, Abdomen and Pelvis was performed.  Sagittal and coronal reformats were performed.    FINDINGS:    CHEST:  LUNGS AND LARGE AIRWAYS: Central airways are patent. No focal   consolidation. Dependent scarring and minimal groundglass. Few scattered   2 mm pulmonary nodules. For reference, right upper lobe 2 mm nodule image   29 series 4, right lower lobe 2 mm nodule image 88 series 4, left upper   lobe 2 mm nodule image 26 series 4.  PLEURA: No pleural effusion or pneumothorax.  VESSELS: Atheromatous changes of the thoracic aorta. Main pulmonary   artery size is mildly enlarged, 3.2 cm.  HEART: Qualitatively, heart size is prominent. Aortic root/valve   calcification and coronary artery calcification. Trace pericardial fluid.  MEDIASTINUM AND SANDIP: No enlarged lymph nodes of the thorax. Small fluid   of the right infrahilar region near the right inferior pulmonary vein is   unchanged on multiple prior studies dating back to July 2021, possibly a   pericardial cyst or recess. Esophagus is underdistended.  CHEST WALL AND LOWER NECK: No chest wall hematoma.    ABDOMEN AND PELVIS:    Solid organ evaluation limited due to noncontrast technique.    LIVER: Borderline enlarged liver measuring 18 cm in craniocaudal   dimension.  BILE DUCTS:No biliary distention  GALLBLADDER: Unremarkable CT appearance  SPLEEN: Normal size  PANCREAS: No main ductal dilatation  ADRENALS: Left adrenal gland thickening, similar to prior  KIDNEYS/URETERS: No hydronephrosis. Renal cysts, suboptimally   characterized due to lack of contrast.    BLADDER: Minimally distended.  REPRODUCTIVE ORGANS: Few coarse calcifications of the prostate    BOWEL: Stomach is underdistended. No small bowel distention. Normal   appendix. Mild stool burden of the colon limitsevaluation of the colonic   mucosa. Colonic diverticulosis, without evidence of acute diverticulitis.  PERITONEUM: No ascites.  VESSELS: No aneurysm of the abdominal aorta. Aortoiliac atheromatous   change.  RETROPERITONEUM/LYMPH NODES: Small volume nodes.  ABDOMINAL WALL: Postprocedural change at the umbilicus. New left   iliopsoas muscle calcification, image 158 series 2 since 10/6/2021.   Question prior injury / myositis ossificans.  BONES: Degenerative changes. Old left lateral rib fractures.    IMPRESSION:    Noncontrast study.    CHEST:  1. No large consolidation. Dependent scarring and minimal groundglass.  2. Few subcentimeter 2 mm nodules can be followed with dedicated chest CT   in 12 months based on patient risk factors.  3. Coronary artery calcification. Aortic root/valve calcification.    ABDOMEN/PELVIS:  1. Colonic diverticulosis, without diverticulitis. Given the history of   rectal bleeding, correlate with hemodynamics and hematocrit to guide   further management.

## 2022-04-13 NOTE — H&P ADULT - PROBLEM SELECTOR PLAN 3
Hgb 9.4 on admission, baseline ~8-9.5  -Likely in setting of hematuria and CKD  -Continue to monitor with daily CBC  -Holding home Eliquis  -Hemodynamically stable Pt recently admitted to [facility] for GIB, transferred to [facility] and d/c'd 4/11  -Denies GIB since hospitalization  -Started Pantoprazole qd  -Continue home Pepcid  -Continue to monitor for signs/symptoms of GIB Pt recently admitted to [facility] for GIB,  got 5 u pRBC in total at [facility]   -Pt was transferred to [facility] for Capsule endoscopy  and d/c'd 4/11  -Denies GIB since hospitalization  -Started Pantoprazole qd  -Continue home Pepcid  -Continue to monitor for signs/symptoms of GIB

## 2022-04-13 NOTE — H&P ADULT - GUM GEN PE MLT EXAM PC
Onset: \"awhile\"    Location / description: Patient states he has been having hemorrhoids for awhile he states but the coughing and diarrhea from COVID has caused them to become much worse. Pain is a 9/10, his rectum is very swollen. He is having blood in the toilet bowl, in his underear, his bed and when he wipes. Encouraged him to be seen in the next few hours. Patient states he will go to the ER.     Precipitating Factors: hemorrhoids, rectal bleeding    Pain Scale (1-10), 10 highest: 9/10    Associated Symptoms: see above     What  improves / worsens symptoms: not obtained     Symptom specific medications: not obtained     Recent visits (last 3-4 weeks) for same reason or recent surgery:  No     Did the patient have a positive coronavirus screening?: Yes  If yes, date of Covid-19 exposure:  Patient is +      PLAN:  See Provider/Go to Urgent Care within next 4 hours    Patient/Caller agrees to follow recommendations.    Reason for Disposition  • SEVERE rectal pain (e.g., excruciating, unable to have a bowel movement)    Protocols used: RECTAL SYMPTOMS-A-AH       Normal genitalia; no lesions; no discharge

## 2022-04-13 NOTE — H&P ADULT - ATTENDING COMMENTS
78yo M with PMH of Afib (on eliquis), MGUS, depression, CAD s/p stents, HLD, HTN, CKD, DM2, diverticulosis, questionable CHF (Echo 1/22 LVEF 55%) and BPH presents to the ED with hematuria.  Pt seen, examined, case & care plan d/w pt, residents at detail.  Cardiology-DR Valdovinos-HOLD AC /ASA  D/W Urology-DR Kiser at detail, HOLD AC , Urine Cytology   PT Eval.  AM Labs   PO diet  SCD's

## 2022-04-13 NOTE — H&P ADULT - PROBLEM SELECTOR PLAN 10
Chronic  - Continue home medications Diabetes type II   Hold oral hypoglycemic meds  Insulin Corrective Scale  Finger sticks per routine  Consistent Carb Diet  Hemoglobin A1c in AM  Hypoglycemia protocol

## 2022-04-13 NOTE — CONSULT NOTE ADULT - ASSESSMENT
CKD 4  Hematuria  Recent Anemia, GI bleed  Diabetes  h/o CHF  Hypertension  h/o SLE    Stable renal indices. Monitor h/h trend.  evaluation. Retacrit for anemia.   Monitor blood sugar levels. Insulin coverage as needed. Potassium supplementation.   Monitor BP trend. Titrate BP meds as needed. Will follow electrolytes and renal function trend.   Further recommendations pending clinical course. Thank you for the courtesy of this referral. 
77M with pmh of atrial fibrillation (on eliquis), incomplete RBBB CAD s/p stents, diastolic heart failure, HLD, HTN, CKD stage 3b, DM, anxiety/depression, GIB, recently admitted to [facility] and then transferred to [facility] for GIB, presents with hematuria.     Recommendations:  - Patient is not complaining of any anginal symptoms at this time.  - No clear evidence of acute ischemia  - No acute changes on EKG compared to previous.  - No meaningful evidence of volume overload.  - Previous TTE shows 10/21 EF 55%, mild AS, mild MR, mild TR, moderate LVH  - Hold eliquis for now pending urology recommendations  - Continue ASA, statin, clonidine, lasix, amlodipine, hydralazine  - Denies anginal symptoms, consider d/c'ing imdur  - BP controlled, monitor routine hemodynamics  - Monitor and replete lytes, keep K>4, Mg>2.  - Other cardiovascular workup will depend on clinical course.  - All other workup per primary team.  - Will continue to follow.            
Gross hematuria  Send urine culture, cytology  hold Eliquis, ASA  Discussed with Dr. Robertson

## 2022-04-13 NOTE — PATIENT PROFILE ADULT - FALL HARM RISK - HARM RISK INTERVENTIONS
Assistance with ambulation/Assistance OOB with selected safe patient handling equipment/Communicate Risk of Fall with Harm to all staff/Discuss with provider need for PT consult/Monitor gait and stability/Provide patient with walking aids - walker, cane, crutches/Reinforce activity limits and safety measures with patient and family/Sit up slowly, dangle for a short time, stand at bedside before walking/Tailored Fall Risk Interventions/Visual Cue: Yellow wristband and red socks/Bed in lowest position, wheels locked, appropriate side rails in place/Call bell, personal items and telephone in reach/Instruct patient to call for assistance before getting out of bed or chair/Non-slip footwear when patient is out of bed/[place] to call system/Physically safe environment - no spills, clutter or unnecessary equipment/Purposeful Proactive Rounding/Room/bathroom lighting operational, light cord in reach

## 2022-04-13 NOTE — H&P ADULT - PROBLEM SELECTOR PLAN 12
Chronic  - Continue home Lamictal qd, pt on Aristata IM q8 weeks last injection ___ Chronic  - Continue home Lamictal qd, pt on Aristata IM q8 weeks will hold during hospitalization Chronic  - Continue home Lamictal qd, pt on Aristata IM q8 weeks will hold during hospitalization      DVT PPX: Chronic  - Continue home Lamictal qd      DVT ppx: SCDs in setting of hematuria

## 2022-04-13 NOTE — ED ADULT NURSE NOTE - CAS DISCH ACCOMP BY
Domingo Humphries called requesting a call back from on call provder, regading clarification for pt's rx   Information was sent to provider via [facility]
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
## 2022-04-13 NOTE — ED ADULT NURSE REASSESSMENT NOTE - NS ED NURSE REASSESS COMMENT FT1
Patient refused EKG. I explained the indications twice, but he adamantly refused. Dr. Robertson was informed

## 2022-04-13 NOTE — H&P ADULT - HISTORY OF PRESENT ILLNESS
78yo M with PMH of Afib (on eliquis), MGUS, depression, CAD s/p stents, HLD, HTN, CKD, DM2, diverticulosis, questionable CHF (Echo 1/22 LVEF 55%) and BPH presents to the ED with hematuria. Patient states he went to void at 3AM and noticed it was difficult for him to urinate at first, but when he was able to void, he saw a lot of mikayla blood. Patient had 5 episodes of mikayla blood without clots along with dysuria. Denies trauma, falls, penile lesion. Per chart review, patient was admitted to [facility] on 3/30/22 for a rectal bleed and was transfused 4-5 units of pRBC and transferred to [facility] for further workup after a bleeding scan showed a jejunal bleed. During this time, Eliquis was held. Capsule endoscopy revealed possible small bleeding site. Pt's Eliquis was resumed on 4/8 with no reoccurrence of a GIB. Pt last took Eliquis this AM.     Admits to HA, dizziness, nausea, decreased PO intake. Denies fever/chills, chest pain, palpitations, vomiting, constipation, diarrhea, hematochezia.     ED course:   Vitals:  BP:106/71      HR: 61     Temp: 97.2      RR:18     O2: 96%  Labs significant for: H/H 9.4/28.1, PT 21.7, INR 1.84, PTT 45.8, BUN/Cr 31/2.3, glucose 116  EKG: sinus bradycardia with 1st degree AV block, LVH with QRS widening and repolarization abnormality 78yo M with PMH of Afib (on eliquis), MGUS, depression, CAD s/p stents, HLD, HTN, CKD, DM2, diverticulosis, CHF (Echo 1/22 LVEF 55%) and BPH presents to the ED with hematuria. Patient states he went to void at 3AM and noticed it was difficult for him to urinate at first, but when he was able to void, he saw a lot of mikayla blood. Patient had 5 episodes of mikayla blood without clots along with dysuria. Denies trauma, falls, penile lesion. Per chart review, patient was admitted to [facility] on 3/30/22 for a rectal bleed and was transfused 4-5 units of pRBC and transferred to [facility] for further workup after a bleeding scan showed a jejunal bleed. During this time, Eliquis was held. Capsule endoscopy revealed possible small bleeding site. Pt's Eliquis was resumed on 4/8 with no reoccurrence of a GIB. Pt last took Eliquis this AM.     Admits to HA, dizziness, nausea, decreased PO intake. Denies fever/chills, chest pain, palpitations, vomiting, constipation, diarrhea, hematochezia.     ED course:   Vitals:  BP:106/71      HR: 61     Temp: 97.2      RR:18     O2: 96%  Labs significant for: H/H 9.4/28.1, PT 21.7, INR 1.84, PTT 45.8, BUN/Cr 31/2.3, glucose 116  EKG: sinus bradycardia with 1st degree AV block, LVH with QRS widening and repolarization abnormality 78yo M with PMH of Afib (on eliquis), MGUS, depression, CAD s/p stents, HLD, HTN, CKD, DM2, diverticulosis, Anemia , Diastolic  CHF (Echo 1/22 LVEF 55%) and BPH with recent GI Bleed 4/22 with multiple pRBC transfusions recently d/chio from [facility] 4/11/22 presents to the ED with hematuria. Patient states he went to void at 3AM and noticed it was difficult for him to urinate at first, but when he was able to void, he saw a lot of mikayla blood. Patient had 5 episodes of mikayla blood without clots along with dysuria. Denies trauma, falls, penile lesion. Per chart review, patient was admitted to [facility] on 3/30/22 for a rectal bleed and was transfused 4-5 units of pRBC and transferred to [facility] for further workup after a bleeding scan showed a jejunal bleed. During this time, Eliquis was held. Capsule endoscopy revealed possible small bleeding site. Pt's Eliquis was resumed on 4/8 with no reoccurrence of a GIB. Pt last took Eliquis this AM.     Admits to HA, dizziness, nausea, decreased PO intake. Denies fever/chills, chest pain, palpitations, vomiting, constipation, diarrhea, hematochezia.     ED course:   Vitals:  BP:106/71      HR: 61     Temp: 97.2      RR:18     O2: 96%  Labs significant for: H/H 9.4/28.1, PT 21.7, INR 1.84, PTT 45.8, BUN/Cr 31/2.3, glucose 116  EKG: sinus bradycardia with 1st degree AV block, LVH with QRS widening and repolarization abnormality

## 2022-04-13 NOTE — ED ADULT NURSE NOTE - ED CARDIAC RATE
Start warm baths with baking soda: 3-4 tablespoons of baking soda in a full standard sized bath tub, soak for 15 minutes a day, everyday, for 5 days and then as needed.   Wear cotton underwear.   Use flushable wet wipes. Discussed proper wiping methods.   Discussed signs and symptoms of bacterial and yeast vaginosis.   If no improvement in 1 week- RTC, plan on obtaining vaginal swab for culture.    Breathing exercises/Yoga 10 minutes a day: Wioff breathing exercise- 10 minute CDEL video  Headspace Izzy/coloring before bedtime  30 minutes of exercise three times a week  Stay well hydrated.   Limited social media and screen time use.     In case of an Emergency, please call 911.     Mental health intake lines:     Gamaliel Hutton ([facility])   Address: [address], [place]   Phone: [phone]     [facility] (inpatient)   [facility] (outpatient)   [phone]     [facility]   Address: [address] [place] [address]   Phone: [phone]     Therapy Groups for Cognitive Behavioral Therapy, or any other group of your choice. Please call your insurance and ask about coverage as well.     [facility]: Psychology and Psychiatry Services  Multiple locations  [phone]    [facility]  [phone] [address] [place] [address]    [facility]  [phone]  [address], [place] [address]    [facility]  [phone]  [address] [place] [address]    [facility]  [phone]  [address], [place] [address]    [facility]  [phone] [address]  [facility]  [phone]  [address], [place] [address]  __    [facility]  [address]    [address]  [place] [address]  Phone:  [phone]    [facility]  Behavioral Medicine  [address]  [address])  [place] [address]  Phone: [phone]    [facility]  [address] [place] [address]  Phone: [phone]    ONLINE THERAPY GROUPS:     1) [email or web address]  2) [email or web address]  3) [facility]  4) Sarah       normal

## 2022-04-13 NOTE — H&P ADULT - PROBLEM SELECTOR PLAN 6
Chronic CHF  -Continue home   -Last echo 1/2022 Moderate left ventricular hypertrophy with normal systolic function, estimated LVEF of 55%. Grossly normal RV size and systolic function. Calcified trileaflet aortic valve with mild aortic stenosis. Mild MR and TR. No significant pericardial effusion.  -Dash/ TLC Diet  -Cardio Dr Valdovinos consulted, f/u recs Cr 2.3 on admission, baseline per chart review ~1.9-3, though Cr was 1.55 recently on 4/11  -Likely in the setting of hematuria but also decreased PO intake   -Encourage PO intake   -Consider Nephro consult Cr 2.3 on admission, baseline per chart review ~1.9-3, though Cr was 1.55 recently on 4/11  -Likely in the setting of hematuria but also decreased PO intake   -Encourage PO intake   -Continue to trend CKD 3-Cr 2.3 on admission, baseline per chart review ~1.9-3, though Cr was 1.55 recently on 4/11  -Likely in the setting of hematuria but also decreased PO intake , pt able to void   -Pt on PO Lasix   -Renal dose meds   -Follow BMP

## 2022-04-13 NOTE — H&P ADULT - NSHPPOADEEPVENOUSTHROMB_GEN_A_CORE
Please put following on letterhead    To whom it may concern,     Yazmin Espinoza is under my care for a medical problem.  I have prescribed treatment for her which will start Monday 10/23/20 and continue through 10/24/20.   She will not be able to attend zoom meetings during this time.      Thank you,     Dr. Mabel Conteh  
no

## 2022-04-13 NOTE — H&P ADULT - PROBLEM SELECTOR PLAN 9
Diabetes type II   Hold oral hypoglycemic meds  Insulin Corrective Scale  Finger sticks per routine  Consistent Carb Diet  Hemoglobin A1c in AM  Hypoglycemia protocol Chronic  - Continue home atorvastatin

## 2022-04-13 NOTE — H&P ADULT - NSHPSOURCEINFORD_GEN_ALL_CORE
AUTHORIZATION FOR SURGICAL OPERATION OR OTHER PROCEDURE    1. I hereby authorize Dr. Jozef Mccray and the [facility] staff assigned to my case to perform the following operation and/or procedure at the [facility]:    Left L5 transforaminal epidural steroid injection under fluoroscopy     2.  My physician has explained the nature and purpose of the operation or other procedure, possible alternative methods of treatment, the risks involved, and the possibility of complication to me.  I acknowledge that no guarantee has been made as to the result that may be obtained.  3.  I recognize that, during the course of this operation, or other procedure, unforseen conditions may necessitate additional or different procedure than those listed above.  I, therefore, further authorize and request that the above named physician, his/her physician assistants or designees perform such procedures as are, in his/her professional opinion, necessary and desirable.  4.  Any tissue or organs removed in the operation or other procedure may be disposed of by and at the discretion of the [facility] staff and [facility].  5.  I understand that in the event of a medical emergency, I will be transported by local paramedics to [facility] or other hospital emergency department.  6.  I certify that I have read and fully understand the above consent to operation and/or other procedure.    7.  I acknowledge that my physician has explained sedation/analgesia administration to me including the risks and benefits.  I consent to the administration of sedation/analgesia as may be necessary or desirable in the judgement of my physician.    Witness signature: ___________________________________________________ Date:  ______/______/_____                    Time:  ________ A.M.  P.M.       Patient Name:  ______________________________________________________  (please print)       Patient signature:   ___________________________________________________             Relationship to Patient:           []  Parent    Responsible person                          []  Spouse  In case of minor or                    [] Other  _____________   Incompetent name:  __________________________________________________                               (please print)      _____________      Responsible person  In case of minor or  Incompetent signature:  _______________________________________________    Statement of Physician  My signature below affirms that prior to the time of the procedure, I have explained to the patient and/or his/her guardian, the risks and benefits involved in the proposed treatment and any reasonable alternative to the proposed treatment.  I have also explained the risks and benefits involved in the refusal of the proposed treatment and have answered the patient's questions.                        Date:  ______/______/_______  Provider                      Signature:  __________________________________________________________       Time:  ___________ A.M    P.M.      Chart(s)/Patient

## 2022-04-13 NOTE — ED ADULT TRIAGE NOTE - CHIEF COMPLAINT QUOTE
Message noted. hematuria x 1 day, denies pain. recently discharged after GI bleed, currently eliquis

## 2022-04-13 NOTE — H&P ADULT - PROBLEM SELECTOR PLAN 7
Chronic, stable  Continue    with hold parameters  Hemodynamically stable   Dash Diet Chronic CHF  -Continue home Lasix, would hold if ARIELLA worsens   -Last echo 1/2022 Moderate left ventricular hypertrophy with normal systolic function, estimated LVEF of 55%. Grossly normal RV size and systolic function. Calcified trileaflet aortic valve with mild aortic stenosis. Mild MR and TR. No significant pericardial effusion.  -Dash/ TLC Diet  -Cardio Dr Valdovinos consulted, f/u recs Chronic CHF  -Continue home Lasix, would hold if renal function worsens   -Last echo 1/2022 Moderate left ventricular hypertrophy with normal systolic function, estimated LVEF of 55%. Grossly normal RV size and systolic function. Calcified trileaflet aortic valve with mild aortic stenosis. Mild MR and TR. No significant pericardial effusion.  -Dash/ TLC Diet  -Cardio Dr Valdovinos consulted, f/u recs Chronic CHF Diastolic   -Continue home Lasix 40 mg daily , would hold if renal function worsens   -Last echo 1/2022 Moderate left ventricular hypertrophy with normal systolic function, estimated LVEF of 55%. Grossly normal RV size and systolic function. Calcified trileaflet aortic valve with mild aortic stenosis. Mild MR and TR. No significant pericardial effusion.  -Dash/ TLC Diet  -Cardio Dr Valdovinos consulted, f/u recs

## 2022-04-13 NOTE — ED ADULT NURSE NOTE - OBJECTIVE STATEMENT
Stated he woke up thia am at 3 to urinate and observed bright red blood "could not pee, and burning". denied any previous history.  Takes eliquis daily

## 2022-04-13 NOTE — CONSULT NOTE ADULT - ATTENDING COMMENTS
Chart reviewed    Patient seen and examined    Agree with plan as outlined    77M with pmh of atrial fibrillation (on eliquis), incomplete RBBB CAD s/p stents, diastolic heart failure, HLD, HTN, CKD stage 3b, DM, anxiety/depression, GIB, recently admitted to [facility] and then transferred to [facility] for GIB, presents with hematuria.     Recommendations:  - Patient is not complaining of any anginal symptoms at this time.  - No clear evidence of acute ischemia  - No acute changes on EKG compared to previous.  - No meaningful evidence of volume overload.  - Previous TTE shows 10/21 EF 55%, mild AS, mild MR, mild TR, moderate LVH  - Hold eliquis for now pending urology recommendations  - Continue ASA, statin, clonidine, lasix, amlodipine, hydralazine  - Denies anginal symptoms, consider d/c'ing imdur  - BP controlled, monitor routine hemodynamics  - Monitor and replete lytes, keep K>4, Mg>2.  - Other cardiovascular workup will depend on clinical course.  - All other workup per primary team.  - Will continue to follow.

## 2022-04-13 NOTE — PHYSICAL THERAPY INITIAL EVALUATION ADULT - ADDITIONAL COMMENTS
Patient reports that he lives in house with 9 BRYAN, bed/bath on main level. Reports living with 9 other people. Has cane and RW at home.

## 2022-04-13 NOTE — H&P ADULT - PROBLEM SELECTOR PLAN 5
Cr 2.3 on admission, baseline per chart review ~1.9-3, though Cr was 1.55 recently on 4/11  -Likely in the setting of hematuria but also decreased PO intake   -Encourage PO intake   -Consider Nephro consult Chronic, history of stents  - Continue home ASA, Atorvastatin and Imdur  -Hold eliquis in setting of hematuria Chronic, history of stents  - Continue home Atorvastatin and Imdur  -Hold eliquis and ASA in setting of hematuria Chronic, history of stents  - Continue Coreg,  Atorvastatin and Imdur  -Hold Eliquis and ASA in setting of hematuria

## 2022-04-13 NOTE — ED PROVIDER NOTE - PHYSICAL EXAMINATION
Gen: alert, NAD  HEENT:  NC/AT, PERR, no exophthalmos  CV:  well perfused, rrr   Pulm:  normal RR, I/E ratio and chest excursion  Abd: s/nt/nd  MSK: moving all extremities  : bladder scan shows emptied bladder  Neuro:  non-focal  Skin:  visualized areas intact  Psych: AOx3

## 2022-04-13 NOTE — H&P ADULT - NSHPREVIEWOFSYSTEMS_GEN_ALL_CORE
CONSTITUTIONAL: denies fever, chills, fatigue, weakness  HEENT: denies blurred vision, sore throat  SKIN: denies new lesions, rash  CARDIOVASCULAR: denies chest pain, chest pressure, palpitations  RESPIRATORY: denies shortness of breath, cough, hemoptysis   GASTROINTESTINAL: Admits to nausea, denies vomiting, diarrhea, abdominal pain, hematochezia   GENITOURINARY: Admits to hematuria, dysuria, denies discharge or lesion  NEUROLOGICAL: Admits to headache, denies focal weakness  MUSCULOSKELETAL: Admits to chronic b/l knee pain, denies new joint pain, muscle aches  HEMATOLOGIC: Admits to mikayla hematuria, denies bruising  LYMPHATICS: denies extremity swelling

## 2022-04-13 NOTE — H&P ADULT - PROBLEM SELECTOR PLAN 2
Chronic  Rate control:    with hold parameters  Holding Eliquis in setting of hematuria   EKG: sinus bradycardia with 1st degree AV block, LVH with QRS widening and repolarization abnormality  Cardio Dr Valdovinos consulted, f/u recs Chronic  Continue home Amlodipine w/ hold parameters   Holding Eliquis in setting of hematuria   EKG: sinus bradycardia with 1st degree AV block, LVH with QRS widening and repolarization abnormality  Cardio Dr Valdovinos consulted, f/u recs Chronic, HR stable   Continue Coreg  w/ hold parameters   Holding Eliquis in setting of hematuria   EKG: sinus bradycardia with 1st degree AV block, LVH with QRS widening and repolarization abnormality  Cardio Dr Valdovinos consulted, f/u recs

## 2022-04-13 NOTE — H&P ADULT - PROBLEM SELECTOR PLAN 4
Chronic, history of stents  - Continue home medications: Hgb 9.4 on admission, baseline ~8-9.5  -Likely in setting of hematuria and CKD  -Continue to monitor with daily CBC  -Holding home Eliquis  -Hemodynamically stable Hgb 9.4 on admission, baseline ~8-9.5- Chronic anemia 2/2 CKD  Now with Hematuria ,  - recent GI Bleed -small bowel- Jejunum,   pt had Colonoscopy -Diverticulosis & Capsule Endoscopy at [facility] last week -NO active bleeding   -Likely in setting of hematuria and CKD  -Continue to monitor with daily CBC  -Holding home Eliquis & ASA  -Hemodynamically stable

## 2022-04-13 NOTE — ED PROVIDER NOTE - OBJECTIVE STATEMENT
pt s/p hospital stay for GI bleed with jejunal source s/p 12 units of PRBCs over the course of a couple of weeks, on eliquis for afib, discharged 2 days ago from [facility], comes today because of mikayla hematuria at 3am, described as bright blood that filled the toilet.  pt took his eliquis this morning.

## 2022-04-13 NOTE — H&P ADULT - PROBLEM SELECTOR PLAN 1
Pt presents with x5 episodes of hematuria since this AM likely 2/2 eliquis use   -Hemodynamically stable  -Hold home Eliquis  -Urology consulted Dr. Kiser, f/u recs  -Cardiology consulted Dr. Valdovinos, f/u recs Pt presents with x5 episodes of  Gross hematuria since this AM ? Etiology   -Pt is on  Eliquis   -Hemodynamically stable  -Hold home Eliquis/ASA  -Urology consulted Dr. Kiser, f/u recs-NO Urinary retention   -Cardiology consulted Dr. Valdovinos, f/u recs

## 2022-04-13 NOTE — H&P ADULT - ASSESSMENT
76yo M with PMH of Afib (on eliquis), MGUS, depression, CAD s/p stents, HLD, HTN, CKD, DM2, diverticulosis, questionable CHF (Echo 1/22 LVEF 55%) and BPH presents to the ED with hematuria.       Labs significant for: H/H 9.4/28.1, PT 21.7, INR 1.84, PTT 45.8, BUN/Cr 31/2.3, glucose 116     76yo M with PMH of Afib (on eliquis), MGUS, depression, CAD s/p stents, HLD, HTN, CKD, DM2, diverticulosis, questionable CHF (Echo 1/22 LVEF 55%) and BPH presents to the ED with hematuria.

## 2022-04-13 NOTE — ED ADULT NURSE NOTE - NSIMPLEMENTINTERV_GEN_ALL_ED
Implemented All Fall with Harm Risk Interventions:  [place] to call system. Call bell, personal items and telephone within reach. Instruct patient to call for assistance. Room bathroom lighting operational. Non-slip footwear when patient is off stretcher. Physically safe environment: no spills, clutter or unnecessary equipment. Stretcher in lowest position, wheels locked, appropriate side rails in place. Provide visual cue, wrist band, yellow gown, etc. Monitor gait and stability. Monitor for mental status changes and reorient to person, place, and time. Review medications for side effects contributing to fall risk. Reinforce activity limits and safety measures with patient and family. Provide visual clues: red socks.

## 2022-04-13 NOTE — H&P ADULT - NSHPPHYSICALEXAM_GEN_ALL_CORE
T(C): 36.2 (04-13-22 @ 07:50), Max: 36.2 (04-13-22 @ 07:50)  HR: 61 (04-13-22 @ 07:50) (61 - 61)  BP: 106/71 (04-13-22 @ 07:50) (106/71 - 106/71)  RR: 18 (04-13-22 @ 07:50) (18 - 18)  SpO2: 98% (04-13-22 @ 07:50) (98% - 98%)    GENERAL: patient appears well, no acute distress, appropriate, pleasant  EYES: sclera clear, no exudates  ENMT: oropharynx clear without erythema, no exudates, moist mucous membranes  NECK: supple, soft, no thyromegaly noted  LUNGS: good air entry bilaterally, clear to auscultation, symmetric breath sounds, no wheezing or rhonchi appreciated  HEART: soft S1/S2, regular rate and rhythm, no murmurs noted, no lower extremity edema  GASTROINTESTINAL: abdomen is soft, b/l lower quadrants tender to palpation, nondistended, normoactive bowel sounds, no palpable masses  : penis meatus non-erythematous, no discharge or blood visible, no lesions; dry blood surrounding base of penis   INTEGUMENT: warm & dry  MUSCULOSKELETAL: no clubbing or cyanosis, no obvious deformity  NEUROLOGIC: awake, alert, oriented x3, moving extremities, no obvious sensory deficits

## 2022-04-13 NOTE — PHYSICAL THERAPY INITIAL EVALUATION ADULT - PERTINENT HX OF CURRENT PROBLEM, REHAB EVAL
76yo M with PMH of Afib (on eliquis), MGUS, depression, CAD s/p stents, HLD, HTN, CKD, DM2, diverticulosis, questionable CHF (Echo 1/22 LVEF 55%) and BPH presents to the ED with hematuria.

## 2022-04-14 ENCOUNTER — TRANSCRIPTION ENCOUNTER (OUTPATIENT)
Age: 77
End: 2022-04-14

## 2022-04-14 LAB
ANION GAP SERPL CALC-SCNC: 10 MMOL/L — SIGNIFICANT CHANGE UP (ref 5–17)
APTT BLD: 38.6 SEC — HIGH (ref 27.5–35.5)
BASOPHILS # BLD AUTO: 0.02 K/UL — SIGNIFICANT CHANGE UP (ref 0–0.2)
BASOPHILS NFR BLD AUTO: 0.3 % — SIGNIFICANT CHANGE UP (ref 0–2)
BUN SERPL-MCNC: 29 MG/DL — HIGH (ref 7–23)
CALCIUM SERPL-MCNC: 8.6 MG/DL — SIGNIFICANT CHANGE UP (ref 8.5–10.1)
CHLORIDE SERPL-SCNC: 110 MMOL/L — HIGH (ref 96–108)
CO2 SERPL-SCNC: 20 MMOL/L — LOW (ref 22–31)
CREAT ?TM UR-MCNC: 47 MG/DL — SIGNIFICANT CHANGE UP
CREAT SERPL-MCNC: 2 MG/DL — HIGH (ref 0.5–1.3)
EGFR: 34 ML/MIN/1.73M2 — LOW
EOSINOPHIL # BLD AUTO: 0.11 K/UL — SIGNIFICANT CHANGE UP (ref 0–0.5)
EOSINOPHIL NFR BLD AUTO: 1.8 % — SIGNIFICANT CHANGE UP (ref 0–6)
GLUCOSE SERPL-MCNC: 112 MG/DL — HIGH (ref 70–99)
HCT VFR BLD CALC: 24.7 % — LOW (ref 39–50)
HCT VFR BLD CALC: 25.4 % — LOW (ref 39–50)
HGB BLD-MCNC: 8.4 G/DL — LOW (ref 13–17)
HGB BLD-MCNC: 8.5 G/DL — LOW (ref 13–17)
IMM GRANULOCYTES NFR BLD AUTO: 0.2 % — SIGNIFICANT CHANGE UP (ref 0–1.5)
INR BLD: 1.24 RATIO — HIGH (ref 0.88–1.16)
LYMPHOCYTES # BLD AUTO: 1.05 K/UL — SIGNIFICANT CHANGE UP (ref 1–3.3)
LYMPHOCYTES # BLD AUTO: 17.5 % — SIGNIFICANT CHANGE UP (ref 13–44)
MCHC RBC-ENTMCNC: 29.8 PG — SIGNIFICANT CHANGE UP (ref 27–34)
MCHC RBC-ENTMCNC: 34 GM/DL — SIGNIFICANT CHANGE UP (ref 32–36)
MCV RBC AUTO: 87.6 FL — SIGNIFICANT CHANGE UP (ref 80–100)
MONOCYTES # BLD AUTO: 0.4 K/UL — SIGNIFICANT CHANGE UP (ref 0–0.9)
MONOCYTES NFR BLD AUTO: 6.7 % — SIGNIFICANT CHANGE UP (ref 2–14)
NEUTROPHILS # BLD AUTO: 4.4 K/UL — SIGNIFICANT CHANGE UP (ref 1.8–7.4)
NEUTROPHILS NFR BLD AUTO: 73.5 % — SIGNIFICANT CHANGE UP (ref 43–77)
NRBC # BLD: 0 /100 WBCS — SIGNIFICANT CHANGE UP (ref 0–0)
OSMOLALITY UR: 334 MOSM/KG — SIGNIFICANT CHANGE UP (ref 50–1200)
PLATELET # BLD AUTO: 208 K/UL — SIGNIFICANT CHANGE UP (ref 150–400)
POTASSIUM SERPL-MCNC: 3.5 MMOL/L — SIGNIFICANT CHANGE UP (ref 3.5–5.3)
POTASSIUM SERPL-SCNC: 3.5 MMOL/L — SIGNIFICANT CHANGE UP (ref 3.5–5.3)
PROTHROM AB SERPL-ACNC: 14.5 SEC — HIGH (ref 10.5–13.4)
RBC # BLD: 2.82 M/UL — LOW (ref 4.2–5.8)
RBC # FLD: 16 % — HIGH (ref 10.3–14.5)
SODIUM SERPL-SCNC: 140 MMOL/L — SIGNIFICANT CHANGE UP (ref 135–145)
SODIUM UR-SCNC: 85 MMOL/L — SIGNIFICANT CHANGE UP
WBC # BLD: 5.99 K/UL — SIGNIFICANT CHANGE UP (ref 3.8–10.5)
WBC # FLD AUTO: 5.99 K/UL — SIGNIFICANT CHANGE UP (ref 3.8–10.5)

## 2022-04-14 PROCEDURE — 99232 SBSQ HOSP IP/OBS MODERATE 35: CPT

## 2022-04-14 RX ORDER — POTASSIUM CHLORIDE 20 MEQ
40 PACKET (EA) ORAL ONCE
Refills: 0 | Status: COMPLETED | OUTPATIENT
Start: 2022-04-14 | End: 2022-04-14

## 2022-04-14 RX ORDER — TRAMADOL HYDROCHLORIDE 50 MG/1
50 TABLET ORAL EVERY 8 HOURS
Refills: 0 | Status: DISCONTINUED | OUTPATIENT
Start: 2022-04-14 | End: 2022-04-19

## 2022-04-14 RX ORDER — ACETAMINOPHEN 500 MG
1000 TABLET ORAL ONCE
Refills: 0 | Status: DISCONTINUED | OUTPATIENT
Start: 2022-04-14 | End: 2022-04-14

## 2022-04-14 RX ORDER — ACETAMINOPHEN 500 MG
2 TABLET ORAL
Qty: 0 | Refills: 0 | DISCHARGE
Start: 2022-04-14

## 2022-04-14 RX ORDER — APIXABAN 2.5 MG/1
1 TABLET, FILM COATED ORAL
Qty: 0 | Refills: 0 | DISCHARGE

## 2022-04-14 RX ORDER — POTASSIUM CHLORIDE 20 MEQ
20 PACKET (EA) ORAL DAILY
Refills: 0 | Status: DISCONTINUED | OUTPATIENT
Start: 2022-04-14 | End: 2022-04-16

## 2022-04-14 RX ADMIN — Medication 1: at 11:44

## 2022-04-14 RX ADMIN — Medication 20 MILLIEQUIVALENT(S): at 13:06

## 2022-04-14 RX ADMIN — Medication 650 MILLIGRAM(S): at 15:38

## 2022-04-14 RX ADMIN — CARVEDILOL PHOSPHATE 3.12 MILLIGRAM(S): 80 CAPSULE, EXTENDED RELEASE ORAL at 17:49

## 2022-04-14 RX ADMIN — Medication 650 MILLIGRAM(S): at 16:38

## 2022-04-14 RX ADMIN — Medication 650 MILLIGRAM(S): at 00:24

## 2022-04-14 RX ADMIN — PANTOPRAZOLE SODIUM 40 MILLIGRAM(S): 20 TABLET, DELAYED RELEASE ORAL at 06:29

## 2022-04-14 RX ADMIN — ATORVASTATIN CALCIUM 10 MILLIGRAM(S): 80 TABLET, FILM COATED ORAL at 21:47

## 2022-04-14 RX ADMIN — ISOSORBIDE MONONITRATE 30 MILLIGRAM(S): 60 TABLET, EXTENDED RELEASE ORAL at 11:45

## 2022-04-14 RX ADMIN — Medication 40 MILLIGRAM(S): at 05:15

## 2022-04-14 RX ADMIN — Medication 1 TABLET(S): at 05:15

## 2022-04-14 RX ADMIN — Medication 100 MILLIGRAM(S): at 05:16

## 2022-04-14 RX ADMIN — Medication 100 MILLIGRAM(S): at 13:07

## 2022-04-14 RX ADMIN — FAMOTIDINE 40 MILLIGRAM(S): 10 INJECTION INTRAVENOUS at 17:49

## 2022-04-14 RX ADMIN — FAMOTIDINE 40 MILLIGRAM(S): 10 INJECTION INTRAVENOUS at 05:15

## 2022-04-14 RX ADMIN — TRAMADOL HYDROCHLORIDE 50 MILLIGRAM(S): 50 TABLET ORAL at 12:46

## 2022-04-14 RX ADMIN — TRAMADOL HYDROCHLORIDE 50 MILLIGRAM(S): 50 TABLET ORAL at 22:47

## 2022-04-14 RX ADMIN — Medication 1 TABLET(S): at 17:49

## 2022-04-14 RX ADMIN — LAMOTRIGINE 100 MILLIGRAM(S): 25 TABLET, ORALLY DISINTEGRATING ORAL at 11:44

## 2022-04-14 RX ADMIN — PREGABALIN 1000 MICROGRAM(S): 225 CAPSULE ORAL at 11:46

## 2022-04-14 RX ADMIN — CARVEDILOL PHOSPHATE 3.12 MILLIGRAM(S): 80 CAPSULE, EXTENDED RELEASE ORAL at 05:14

## 2022-04-14 RX ADMIN — AMLODIPINE BESYLATE 10 MILLIGRAM(S): 2.5 TABLET ORAL at 05:14

## 2022-04-14 RX ADMIN — Medication 650 MILLIGRAM(S): at 01:24

## 2022-04-14 RX ADMIN — TRAMADOL HYDROCHLORIDE 50 MILLIGRAM(S): 50 TABLET ORAL at 11:46

## 2022-04-14 RX ADMIN — TRAMADOL HYDROCHLORIDE 50 MILLIGRAM(S): 50 TABLET ORAL at 21:47

## 2022-04-14 RX ADMIN — Medication 0.2 MILLIGRAM(S): at 17:49

## 2022-04-14 RX ADMIN — Medication 40 MILLIEQUIVALENT(S): at 15:38

## 2022-04-14 RX ADMIN — Medication 4 MILLIGRAM(S): at 17:49

## 2022-04-14 RX ADMIN — Medication 4 MILLIGRAM(S): at 05:15

## 2022-04-14 RX ADMIN — Medication 150 MILLIGRAM(S): at 13:06

## 2022-04-14 RX ADMIN — Medication 100 MILLIGRAM(S): at 21:47

## 2022-04-14 RX ADMIN — Medication 1 MILLIGRAM(S): at 11:46

## 2022-04-14 RX ADMIN — Medication 0.2 MILLIGRAM(S): at 05:15

## 2022-04-14 NOTE — DISCHARGE NOTE PROVIDER - NSDCQMCOGNITION_NEU_ALL_CORE
No difficulties/Difficulty concentrating/Difficulty making decisions Difficulty concentrating/Difficulty making decisions Difficulty concentrating/Difficulty making decisions/Difficulty remembering

## 2022-04-14 NOTE — PROGRESS NOTE ADULT - SUBJECTIVE AND OBJECTIVE BOX
Patient is a 77y old  Male who presents with a chief complaint of Hematuria (14 Apr 2022 10:23)    Patient seen in follow up for CKD 4.        PAST MEDICAL HISTORY:  Hypertension    Diabetes mellitus    Lupus    Hepatitis C    Anxiety and depression    CAD (coronary artery disease)    Diverticulosis    Hyperlipidemia    HTN (hypertension)    HLD (hyperlipidemia)    Atrial fibrillation    CAD (coronary artery disease)    Type 2 diabetes mellitus    Anxiety    History of diverticulitis    Diverticulosis    Afib    Stage 3 chronic kidney disease    Anemia of chronic disease    Chronic diastolic congestive heart failure    Multiple thyroid nodules    H/O: upper GI bleed      MEDICATIONS  (STANDING):  amLODIPine   Tablet 10 milliGRAM(s) Oral daily  atorvastatin 10 milliGRAM(s) Oral at bedtime  budesonide  80 MICROgram(s)/formoterol 4.5 MICROgram(s) Inhaler 2 Puff(s) Inhalation two times a day  carvedilol 3.125 milliGRAM(s) Oral every 12 hours  cloNIDine 0.2 milliGRAM(s) Oral two times a day  cyanocobalamin 1000 MICROGram(s) Oral daily  dextrose 5%. 1000 milliLiter(s) (50 mL/Hr) IV Continuous <Continuous>  dextrose 5%. 1000 milliLiter(s) (100 mL/Hr) IV Continuous <Continuous>  dextrose 50% Injectable 25 Gram(s) IV Push once  dextrose 50% Injectable 12.5 Gram(s) IV Push once  dextrose 50% Injectable 25 Gram(s) IV Push once  doxazosin Oral Tab/Cap - Peds 4 milliGRAM(s) Oral two times a day  famotidine    Tablet 40 milliGRAM(s) Oral two times a day  folic acid 1 milliGRAM(s) Oral daily  furosemide    Tablet 40 milliGRAM(s) Oral daily  glucagon  Injectable 1 milliGRAM(s) IntraMuscular once  hydrALAZINE 100 milliGRAM(s) Oral three times a day  insulin lispro (ADMELOG) corrective regimen sliding scale   SubCutaneous three times a day before meals  insulin lispro (ADMELOG) corrective regimen sliding scale   SubCutaneous at bedtime  isosorbide   mononitrate ER Tablet (IMDUR) 30 milliGRAM(s) Oral daily  lactobacillus acidophilus 1 Tablet(s) Oral two times a day  lamoTRIgine 100 milliGRAM(s) Oral daily  pantoprazole    Tablet 40 milliGRAM(s) Oral before breakfast  venlafaxine XR. 150 milliGRAM(s) Oral daily    MEDICATIONS  (PRN):  acetaminophen     Tablet .. 650 milliGRAM(s) Oral every 6 hours PRN Temp greater or equal to 38C (100.4F), Mild Pain (1 - 3)  dextrose Oral Gel 15 Gram(s) Oral once PRN Blood Glucose LESS THAN 70 milliGRAM(s)/deciliter  ondansetron Injectable 4 milliGRAM(s) IV Push every 8 hours PRN Nausea and/or Vomiting  traMADol 50 milliGRAM(s) Oral every 8 hours PRN Moderate Pain (4 - 6)    T(C): 36.5 (04-14-22 @ 04:50), Max: 36.7 (04-13-22 @ 14:43)  HR: 61 (04-14-22 @ 04:50) (57 - 81)  BP: 148/65 (04-14-22 @ 08:01) (106/71 - 168/83)  RR: 19 (04-14-22 @ 04:50) (16 - 19)  SpO2: 97% (04-14-22 @ 04:50) (95% - 100%)  Wt(kg): --  I&O's Detail    13 Apr 2022 07:01  -  14 Apr 2022 07:00  --------------------------------------------------------  IN:  Total IN: 0 mL    OUT:    Voided (mL): 0 mL  Total OUT: 0 mL    Total NET: 0 mL          PHYSICAL EXAM:  General: No distress  Respiratory: b/l air entry  Cardiovascular: S1 S2  Gastrointestinal: soft  Extremities:  edema                              8.4    5.99  )-----------( 208      ( 14 Apr 2022 08:28 )             24.7     04-14    140  |  110<H>  |  29<H>  ----------------------------<  112<H>  3.5   |  20<L>  |  2.00<H>    Ca    8.6      14 Apr 2022 08:28    TPro  6.5  /  Alb  3.3  /  TBili  0.3  /  DBili  x   /  AST  18  /  ALT  23  /  AlkPhos  73  04-13        LIVER FUNCTIONS - ( 13 Apr 2022 08:46 )  Alb: 3.3 g/dL / Pro: 6.5 g/dL / ALK PHOS: 73 U/L / ALT: 23 U/L / AST: 18 U/L / GGT: x               Sodium, Serum: 140 (04-14 @ 08:28)  Sodium, Serum: 139 (04-13 @ 08:46)  Sodium, Serum: 141 (04-11 @ 06:05)    Creatinine, Serum: 2.00 (04-14 @ 08:28)  Creatinine, Serum: 2.30 (04-13 @ 08:46)  Creatinine, Serum: 1.55 (04-11 @ 06:05)    Potassium, Serum: 3.5 (04-14 @ 08:28)  Potassium, Serum: 3.9 (04-13 @ 08:46)  Potassium, Serum: 3.4 (04-11 @ 06:05)    Hemoglobin: 8.4 (04-14 @ 08:28)  Hemoglobin: 9.4 (04-13 @ 08:46)  Hemoglobin: 9.5 (04-11 @ 06:05)

## 2022-04-14 NOTE — PROGRESS NOTE ADULT - SUBJECTIVE AND OBJECTIVE BOX
INTERVAL Hx:  Pt went into clot retention.  Surgical PA started CBI early this AM.  Currently draining minimally blood tinged urine at moderate inflow.  I manually irrigated bladder and removed a small amount of clot, then resumed CBI.  Results of urine culture and cytology sent yesterday pending.    MEDICATIONS  (STANDING):  amLODIPine   Tablet 10 milliGRAM(s) Oral daily  atorvastatin 10 milliGRAM(s) Oral at bedtime  budesonide  80 MICROgram(s)/formoterol 4.5 MICROgram(s) Inhaler 2 Puff(s) Inhalation two times a day  carvedilol 3.125 milliGRAM(s) Oral every 12 hours  cloNIDine 0.2 milliGRAM(s) Oral two times a day  cyanocobalamin 1000 MICROGram(s) Oral daily  dextrose 5%. 1000 milliLiter(s) (50 mL/Hr) IV Continuous <Continuous>  dextrose 5%. 1000 milliLiter(s) (100 mL/Hr) IV Continuous <Continuous>  dextrose 50% Injectable 25 Gram(s) IV Push once  dextrose 50% Injectable 12.5 Gram(s) IV Push once  dextrose 50% Injectable 25 Gram(s) IV Push once  doxazosin Oral Tab/Cap - Peds 4 milliGRAM(s) Oral two times a day  famotidine    Tablet 40 milliGRAM(s) Oral two times a day  folic acid 1 milliGRAM(s) Oral daily  furosemide    Tablet 40 milliGRAM(s) Oral daily  glucagon  Injectable 1 milliGRAM(s) IntraMuscular once  hydrALAZINE 100 milliGRAM(s) Oral three times a day  insulin lispro (ADMELOG) corrective regimen sliding scale   SubCutaneous three times a day before meals  insulin lispro (ADMELOG) corrective regimen sliding scale   SubCutaneous at bedtime  isosorbide   mononitrate ER Tablet (IMDUR) 30 milliGRAM(s) Oral daily  lactobacillus acidophilus 1 Tablet(s) Oral two times a day  lamoTRIgine 100 milliGRAM(s) Oral daily  pantoprazole    Tablet 40 milliGRAM(s) Oral before breakfast  potassium chloride    Tablet ER 20 milliEquivalent(s) Oral daily  venlafaxine XR. 150 milliGRAM(s) Oral daily    MEDICATIONS  (PRN):  acetaminophen     Tablet .. 650 milliGRAM(s) Oral every 6 hours PRN Temp greater or equal to 38C (100.4F), Mild Pain (1 - 3)  dextrose Oral Gel 15 Gram(s) Oral once PRN Blood Glucose LESS THAN 70 milliGRAM(s)/deciliter  ondansetron Injectable 4 milliGRAM(s) IV Push every 8 hours PRN Nausea and/or Vomiting  traMADol 50 milliGRAM(s) Oral every 8 hours PRN Moderate Pain (4 - 6)        Vital Signs Last 24 Hrs  T(C): 36.6 (14 Apr 2022 20:11), Max: 36.6 (14 Apr 2022 20:11)  T(F): 97.9 (14 Apr 2022 20:11), Max: 97.9 (14 Apr 2022 20:11)  HR: 67 (14 Apr 2022 20:11) (61 - 72)  BP: 137/75 (14 Apr 2022 20:11) (137/75 - 168/83)  BP(mean): --  RR: 18 (14 Apr 2022 20:11) (18 - 19)  SpO2: 95% (14 Apr 2022 20:11) (95% - 97%)    PHYSICAL EXAM:    ABDOMEN: soft, NT    El: 3 way hematuria catheter, CBI in progress    LABS:                        8.5    x     )-----------( x        ( 14 Apr 2022 17:48 )             25.4     04-14    140  |  110<H>  |  29<H>  ----------------------------<  112<H>  3.5   |  20<L>  |  2.00<H>    Ca    8.6      14 Apr 2022 08:28    TPro  6.5  /  Alb  3.3  /  TBili  0.3  /  DBili  x   /  AST  18  /  ALT  23  /  AlkPhos  73  04-13

## 2022-04-14 NOTE — DISCHARGE NOTE PROVIDER - NSDCFUADDINST_GEN_ALL_CORE_FT
Low sodium & low cholesterol  Low sodium & low cholesterol diet.  STOP Eliquis  as you had GI Bleed/  Bleed. Follow up with DR Sheridan in 1-2 weeks   Start ASA on 4/24/22

## 2022-04-14 NOTE — DISCHARGE NOTE PROVIDER - NSDCCPCAREPLAN_GEN_ALL_CORE_FT
PRINCIPAL DISCHARGE DIAGNOSIS  Diagnosis: Hematuria  Assessment and Plan of Treatment: Hematuria is blood in the urine.   This condition can be caused by infections of the bladder, urethra, kidney, or prostate, but is probably that you had hematuria secondary to Eliquis   - We hold aspirin and Eliquis during you hospital stay and was placed a mckinnon with constant irrigation. You were seen by urology   Please follow up with urology within one week of hospital discharge   -if you develop back or abdominal pain, blood clots, lightheadedness, fever, nausea, vomiting or worsening symptoms come back to the ED      SECONDARY DISCHARGE DIAGNOSES  Diagnosis: BPH (benign prostatic hyperplasia)  Assessment and Plan of Treatment: You have a known history of benign prostatic hyperplasia, also called "BPH," is a common problem. any men with BPH have no symptoms at all.   CONTINUE:   - Doxazosin 1 table two time at day,   - Oxybutrynin one table once a day    If you cannot urinate at all, call your doctor right away.    Diagnosis: Afib  Assessment and Plan of Treatment: You have a known diagnosis of atrial fibrillation prior to your admission. This condition, if not treated, increases your risk of stroke or heart attack.   CONTINUE  - STOP eliquis   - Carvedilol 1 tablet two times a day  Please follow up with your PCP  Dr. Man and cardiologist ------ within one week of hospital discharge    Diagnosis: HTN (hypertension)  Assessment and Plan of Treatment: You have history of elevated blood pressure.   Continue taking:  - Hydralazine 1 tablet 3 times a day   - amlodipine one tablet at bedtime   - clonidine 1 tablet 2 times a day       Diagnosis: DM (diabetes mellitus)  Assessment and Plan of Treatment: You have history of diabetes   - Continue metformine 1 tablet 2 time a day     PRINCIPAL DISCHARGE DIAGNOSIS  Diagnosis: Hematuria  Assessment and Plan of Treatment: Hematuria is blood in the urine. This condition can be caused by infections of the bladder, urethra, kidney, or prostate, but most likely you had hematuria secondary to Eliquis   - We held your aspirin and Eliquis during your hospital stay and we placed a mckinnon and irrigated your bladder until the hematuria resolved, as recommended by the Urologist.   Please follow up with urology Dr. Kiser within one week of hospital discharge   -if you develop back or abdominal pain, blood clots, lightheadedness, fever, nausea, vomiting or worsening symptoms come back to the ED      SECONDARY DISCHARGE DIAGNOSES  Diagnosis: Afib  Assessment and Plan of Treatment: You have a known diagnosis of atrial fibrillation prior to your admission. This condition, if not treated, increases your risk of stroke or heart attack.   - STOP eliquis   - Continue Carvedilol 1 tablet two times a day  Please follow up with your PCP  Dr. Man and cardiologist Dr. Curtis within two weeks of hospital discharge    Diagnosis: HTN (hypertension)  Assessment and Plan of Treatment: You have history of elevated blood pressure.   Continue taking:  - Hydralazine 1 tablet 3 times a day   - amlodipine one tablet at bedtime   - clonidine 1 tablet 2 times a day   Please follow up with your PCP  Dr. Man within two weeks of hospital discharge      Diagnosis: DM (diabetes mellitus)  Assessment and Plan of Treatment: You have history of diabetes   - Continue metformin 1 tablet 2 time a day  Please follow up with your PCP  Dr. Man within two weeks of hospital discharge    Diagnosis: BPH (benign prostatic hyperplasia)  Assessment and Plan of Treatment: You have a known history of benign prostatic hyperplasia, also called "BPH," is a common problem. Many men with BPH have no symptoms at all.   CONTINUE:   - Doxazosin 1 table two time at day,   - Oxybutrynin one table once a day    If you cannot urinate at all, call your doctor right away.  Please follow up with your PCP  Dr. Man within two weeks of hospital discharge     PRINCIPAL DISCHARGE DIAGNOSIS  Diagnosis: Hematuria  Assessment and Plan of Treatment: Hematuria is blood in the urine. This condition can be caused by infections of the bladder, urethra, kidney, or prostate, but most likely you had hematuria secondary to Eliquis   - We held your aspirin and Eliquis during your hospital stay and we placed a mckinnon and irrigated your bladder until the hematuria resolved, as recommended by the Urologist.   -DO NOT continue your Eliquis  -Please RESUME taking Aspirin once a day starting on Sunday, April 24th  Please follow up with urology Dr. Kiser within one week of hospital discharge. If you develop back or abdominal pain, blood clots, lightheadedness, fever, nausea, vomiting or worsening symptoms come back to the ED      SECONDARY DISCHARGE DIAGNOSES  Diagnosis: Afib  Assessment and Plan of Treatment: You have a known diagnosis of atrial fibrillation prior to your admission. This condition, if not treated, increases your risk of stroke or heart attack.   - STOP eliquis   - Continue Carvedilol 1 tablet two times a day  Please follow up with your PCP  Dr. Man and cardiologist Dr. Curtis within two weeks of hospital discharge    Diagnosis: HTN (hypertension)  Assessment and Plan of Treatment: You have history of elevated blood pressure.   Continue taking:  - Hydralazine 1 tablet 3 times a day   - amlodipine one tablet at bedtime   - clonidine 1 tablet 2 times a day   -Your Isosorbide dose was increased by the Cardiology team. Please take the new dose as prescribed, DO NOT resume taking your previous dose.   Please follow up with your PCP  Dr. Man within two weeks of hospital discharge    Diagnosis: DM (diabetes mellitus)  Assessment and Plan of Treatment: You have history of diabetes   - Continue metformin 1 tablet 2 time a day  Please follow up with your PCP  Dr. Man within two weeks of hospital discharge    Diagnosis: BPH (benign prostatic hyperplasia)  Assessment and Plan of Treatment: You have a known history of benign prostatic hyperplasia, also called "BPH," it is benign enlargement of your prostate. Many men with BPH have no symptoms at all.   CONTINUE:   - Doxazosin 1 table two time at day,   - Oxybutrynin one table once a day    If you cannot urinate at all, call your doctor right away.  Please follow up with your PCP  Dr. Man within two weeks of hospital discharge    Diagnosis: Constipation  Assessment and Plan of Treatment: You were constipated during your hospital course. Please take Senna as prescribed, only as needed, to have regular bowel movements. Please do not take Senna if you're having loose stools/diarrhea.     PRINCIPAL DISCHARGE DIAGNOSIS  Diagnosis: Hematuria  Assessment and Plan of Treatment: Hematuria is blood in the urine. This condition can be caused by infections of the bladder, urethra, kidney, or prostate, but most likely you had hematuria secondary to Eliquis   - We held your aspirin and Eliquis during your hospital stay and we placed a mckinnon and irrigated your bladder until the hematuria resolved, as recommended by the Urologist.   -STOP ELIQUIS AND REASUME taking Aspirin once a day starting on Sunday, April 24th  - Please follow up with urology Dr. Kiser within one week of hospital discharge.   If you develop back or abdominal pain, blood clots, lightheadedness, fever, nausea, vomiting or worsening symptoms come back to the ED      SECONDARY DISCHARGE DIAGNOSES  Diagnosis: Afib  Assessment and Plan of Treatment: You have a known diagnosis of atrial fibrillation prior to your admission. This condition, if not treated, increases your risk of stroke or heart attack.   - STOP eliquis RESUME taking Aspirin once a day starting on Sunday, April 24th  - Continue Carvedilol 1 tablet two times a day  Please follow up with your PCP  Dr. Man and cardiologist Dr. Curtis within two weeks of hospital discharge      Diagnosis: HTN (hypertension)  Assessment and Plan of Treatment: You have history of elevated blood pressure.   Continue taking:  - Hydralazine 1 tablet 3 times a day   - amlodipine one tablet at bedtime   - clonidine 1 tablet 2 times a day   -Your IMDUR dose was increased by the Cardiology team. Please take the new dose as prescribed, DO NOT resume taking your previous dose.   Please follow up with your PCP  Dr. Man within two weeks of hospital discharge    Diagnosis: DM (diabetes mellitus)  Assessment and Plan of Treatment: You have history of diabetes   - Last hemoglobin A1C was 5.8 (03/22), that mean you can control your sugar with healthy diet and regular exercises  - STOP METFORMIN   - Please follow up with your PCP  Dr. Man within two weeks of hospital discharge    Diagnosis: BPH (benign prostatic hyperplasia)  Assessment and Plan of Treatment: You have a known history of benign prostatic hyperplasia, also called "BPH," it is benign enlargement of your prostate. Many men with BPH have no symptoms at all.   CONTINUE:   - Doxazosin 1 table two time at day,   - Oxybutrynin one table once a day    If you cannot urinate at all, call your doctor right away.  Please follow up with your PCP  Dr. Man within two weeks of hospital discharge    Diagnosis: Constipation  Assessment and Plan of Treatment: You were constipated during your hospital course. Please take Senna 2 tablets at the bedtime and if you still contipated take Miralax and Dulcolax   Please do not take lasatives if you're having loose stools/diarrhea.     PRINCIPAL DISCHARGE DIAGNOSIS  Diagnosis: Hematuria  Assessment and Plan of Treatment: Hematuria is blood in the urine.   -Etiology unclear - most likely you had hematuria secondary to Eliquis   - We held your aspirin and Eliquis during your hospital stay and we placed a mckinnon- CBI  and irrigated your bladder until the hematuria resolved, as recommended by the Urologist.   -STOP ELIQUIS AND Follow up with DR Sheridan in 2weeks.   -REASUME taking Aspirin once a day starting on Sunday, April 24th  - Please follow up with urology Dr. Kiser within one week of hospital discharge.   If you develop back or abdominal pain, blood clots, lightheadedness, fever, nausea, vomiting or worsening symptoms come back to the ED      SECONDARY DISCHARGE DIAGNOSES  Diagnosis: Afib  Assessment and Plan of Treatment: You have a known diagnosis of atrial fibrillation prior to your admission. This condition, if not treated, increases your risk of stroke or heart attack.   - STOP eliquis RESUME taking Aspirin once a day starting on Sunday, April 24th  - Continue Carvedilol 1 tablet two times a day  Please follow up with your PCP  Dr. Man and cardiologist Dr. Curtis within two weeks of hospital discharge      Diagnosis: HTN (hypertension)  Assessment and Plan of Treatment: You have history of elevated blood pressure.   Continue taking:  - Hydralazine 1 tablet 3 times a day   - amlodipine one tablet at bedtime   - clonidine 1 tablet 2 times a day   -Your IMDUR dose was increased by the Cardiology team. Please take the new dose as prescribed, DO NOT resume taking your previous dose.   Please follow up with your PCP  Dr. Man within two weeks of hospital discharge    Diagnosis: DM (diabetes mellitus)  Assessment and Plan of Treatment: You have history of diabetes   - Last hemoglobin A1C was 5.8 (03/22), that mean you can control your sugar with healthy diet and regular exercises  - STOP METFORMIN   - Please follow up with your PCP  Dr. Man within two weeks of hospital discharge    Diagnosis: BPH (benign prostatic hyperplasia)  Assessment and Plan of Treatment: You have a known history of benign prostatic hyperplasia, also called "BPH," it is benign enlargement of your prostate. Many men with BPH have no symptoms at all.   CONTINUE:   - Doxazosin 1 table two time at day,   - Oxybutrynin one table once a day    If you cannot urinate at all, call your doctor right away.  Please follow up with your PCP  Dr. Man within two weeks of hospital discharge    Diagnosis: Constipation  Assessment and Plan of Treatment: You were constipated during your hospital course. Please take Senna 2 tablets at the bedtime and if you still contipated take Miralax and Dulcolax   Please do not take lasatives if you're having loose stools/diarrhea.     PRINCIPAL DISCHARGE DIAGNOSIS  Diagnosis: Hematuria  Assessment and Plan of Treatment: Hematuria is blood in the urine.   -Etiology unclear - most likely you had hematuria secondary to Eliquis   - We held your aspirin and Eliquis during your hospital stay and we placed a mckinnon- CBI  and irrigated your bladder until the hematuria resolved, as recommended by the Urologist.   -STOP ELIQUIS AND Follow up with DR Sheridan in 2weeks.   -REASUME taking Aspirin once a day starting on Sunday, April 24th  - Please follow up with urology Dr. Kiser within one week of hospital discharge.   -If you develop back or abdominal pain, blood clots, lightheadedness, fever, nausea, vomiting or worsening symptoms come back to the ED      SECONDARY DISCHARGE DIAGNOSES  Diagnosis: Afib  Assessment and Plan of Treatment: You have a known diagnosis of atrial fibrillation prior to your admission. This condition, if not treated, increases your risk of stroke or heart attack.   - STOP eliquis RESUME taking Aspirin once a day starting on Sunday, April 24th  - Continue Carvedilol 1 tablet two times a day  Please follow up with your PCP  Dr. Man and cardiologist Dr. Curtis within two weeks of hospital discharge      Diagnosis: HTN (hypertension)  Assessment and Plan of Treatment: You have history of elevated blood pressure.   Continue taking:  -Carvedilol 1 tablet two times a day  - Hydralazine 1 tablet 3 times a day   - Amlodipine one tablet at bedtime   - clonidine 1 tablet 2 times a day   -IMdur daily  -Your IMDUR dose was increased by the Cardiology team. Please take the new dose as prescribed, DO NOT resume taking your previous dose.   Please follow up with your PCP  Dr. Man within two weeks of hospital discharge    Diagnosis: DM (diabetes mellitus)  Assessment and Plan of Treatment: You have history of diabetes   - Last hemoglobin A1C was 5.8 (03/22), that mean you can control your sugar with healthy diet and regular exercises  - STOP METFORMIN as HbA1c 5.8 -If need medication start Jenuvia 25 mg daily as out pt-Renal Dose , NO Metformin 2/2 CKD  - Please follow up with your PCP  Dr. Man within two weeks of hospital discharge    Diagnosis: Chronic CHF  Assessment and Plan of Treatment: Chronic Diastolic CHF   On Lasix 40 mg daily   -On Coreg 2x day    Diagnosis: BPH (benign prostatic hyperplasia)  Assessment and Plan of Treatment: You have a known history of benign prostatic hyperplasia, also called "BPH," it is benign enlargement of your prostate. Many men with BPH have no symptoms at all.   CONTINUE:   - Doxazosin 1 table two time at day,   - Oxybutrynin one table once a day    If you cannot urinate at all, call your doctor right away.  Please follow up with your PCP  Dr. Man within two weeks of hospital discharge    Diagnosis: CAD (coronary artery disease)  Assessment and Plan of Treatment: H/O CAD   REstart ASA 81 mg daily on 4/24/22   Continue Coreg 2x day  Imdur dailyas increase dose    Diagnosis: Anemia  Assessment and Plan of Treatment: Chronic Anemia 2/2 GI Bleed & Hematuria,   -2/2 CKD 3   H/H stable. CBC in 1week  Follow with DR Rodriguez -Hematologist in 2-3 weeks    Diagnosis: Constipation  Assessment and Plan of Treatment: You were constipated during your hospital course. Please take Senna 2 tablets at the bedtime and if you still contipated take Miralax and Dulcolax   Please do not take lasatives if you're having loose stools/diarrhea.

## 2022-04-14 NOTE — PROGRESS NOTE ADULT - PROBLEM SELECTOR PLAN 4
Hgb 9.4 on admission, baseline ~8-9.5- Chronic anemia 2/2 CKD  Now with Hematuria ,  - recent GI Bleed -small bowel- Jejunum,   pt had Colonoscopy -Diverticulosis & Capsule Endoscopy at [facility] last week -NO active GI bleed    -Likely in setting of hematuria and CKD  -Continue to monitor with daily CBC  -Holding home Eliquis & ASA  -Hemodynamically stable Chronic, HR stable   Continue Coreg  w/ hold parameters   Holding Eliquis in setting of hematuria   EKG: sinus bradycardia with 1st degree AV block, LVH with QRS widening and repolarization abnormality  Cardio DR. Bliss's group following

## 2022-04-14 NOTE — PROGRESS NOTE ADULT - PROBLEM SELECTOR PLAN 3
Pt recently admitted to [facility] for GIB,  got 5 u pRBC in total at [facility]   -Pt was transferred to [facility] for Capsule endoscopy  and d/c'd 4/11  -Denies GIB since hospitalization  -Started Pantoprazole qd  -Continue home Pepcid  -Continue to monitor for signs/symptoms of GIB CKD 3-Cr 2.3 on admission, baseline per chart review ~1.9-3,  - Creatinine downtrending 2.0   - Follow BMP CKD 3-Cr 2.3 on admission, baseline per chart review ~1.9-3,  - Creatinine downtrending 2.0   - Follow BMP  -Renal Dose Meds

## 2022-04-14 NOTE — PROGRESS NOTE ADULT - SUBJECTIVE AND OBJECTIVE BOX
[facility] -- Fifi Bliss, Bonifacio, Kalpesh, Abraham Sheridan Savella  Office # [phone]      Follow Up:    HTN  Subjective/Observations:   No events overnight resting comfortably in bed.  No complaints of chest pain, dyspnea, or palpitations reported. No signs of orthopnea or PND.  CBI in place   complaints of some g    REVIEW OF SYSTEMS: All other review of systems is negative unless indicated above    PAST MEDICAL & SURGICAL HISTORY:  HTN (hypertension)  c/b multiple episodes of hypertensive urgency    HLD (hyperlipidemia)    Atrial fibrillation  first documented on EKG 10/7/2021    CAD (coronary artery disease)  s/p stents (not on plavix)    Type 2 diabetes mellitus  not on home insulin/ Meds    Anxiety  Depression  multiple psych medications    History of diverticulitis  07/2021    Diverticulosis  c/b GIB in 2020    Afib  on AC    Stage 3 chronic kidney disease    Anemia of chronic disease  Monoclonal Gammopathy-MGUS  pRBC transfusion 10/15/21    Chronic diastolic congestive heart failure    Multiple thyroid nodules    H/O: upper GI bleed  4/22, small Bowel Bleed, 5 u pRBC Transfusion 4/22  S/P Colonoscopy- Diverticulosis  S/P Capsule Endoscopy done -[facility] -No active bleeding    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  Had surgery ( April 2013 )    S/P tonsillectomy    S/P arthroscopic knee surgery  Bilateral ( 2005 )    Torsion of testicle  Had surgery at age 14    Pilonidal cyst  Had surgery ( 1969 )    S/P cataract surgery  Bilateral    H/O hernia repair        MEDICATIONS  (STANDING):  amLODIPine   Tablet 10 milliGRAM(s) Oral daily  atorvastatin 10 milliGRAM(s) Oral at bedtime  budesonide  80 MICROgram(s)/formoterol 4.5 MICROgram(s) Inhaler 2 Puff(s) Inhalation two times a day  carvedilol 3.125 milliGRAM(s) Oral every 12 hours  cloNIDine 0.2 milliGRAM(s) Oral two times a day  cyanocobalamin 1000 MICROGram(s) Oral daily  dextrose 5%. 1000 milliLiter(s) (50 mL/Hr) IV Continuous <Continuous>  dextrose 5%. 1000 milliLiter(s) (100 mL/Hr) IV Continuous <Continuous>  dextrose 50% Injectable 25 Gram(s) IV Push once  dextrose 50% Injectable 12.5 Gram(s) IV Push once  dextrose 50% Injectable 25 Gram(s) IV Push once  doxazosin Oral Tab/Cap - Peds 4 milliGRAM(s) Oral two times a day  famotidine    Tablet 40 milliGRAM(s) Oral two times a day  folic acid 1 milliGRAM(s) Oral daily  furosemide    Tablet 40 milliGRAM(s) Oral daily  glucagon  Injectable 1 milliGRAM(s) IntraMuscular once  hydrALAZINE 100 milliGRAM(s) Oral three times a day  insulin lispro (ADMELOG) corrective regimen sliding scale   SubCutaneous three times a day before meals  insulin lispro (ADMELOG) corrective regimen sliding scale   SubCutaneous at bedtime  isosorbide   mononitrate ER Tablet (IMDUR) 30 milliGRAM(s) Oral daily  lactobacillus acidophilus 1 Tablet(s) Oral two times a day  lamoTRIgine 100 milliGRAM(s) Oral daily  pantoprazole    Tablet 40 milliGRAM(s) Oral before breakfast  venlafaxine XR. 150 milliGRAM(s) Oral daily    MEDICATIONS  (PRN):  acetaminophen     Tablet .. 650 milliGRAM(s) Oral every 6 hours PRN Temp greater or equal to 38C (100.4F), Mild Pain (1 - 3)  dextrose Oral Gel 15 Gram(s) Oral once PRN Blood Glucose LESS THAN 70 milliGRAM(s)/deciliter  ondansetron Injectable 4 milliGRAM(s) IV Push every 8 hours PRN Nausea and/or Vomiting  traMADol 50 milliGRAM(s) Oral every 8 hours PRN Moderate Pain (4 - 6)      Allergies    No Known Allergies    Intolerances        Vital Signs Last 24 Hrs  T(C): 36.5 (14 Apr 2022 04:50), Max: 36.7 (13 Apr 2022 14:43)  T(F): 97.7 (14 Apr 2022 04:50), Max: 98 (13 Apr 2022 14:43)  HR: 61 (14 Apr 2022 04:50) (57 - 81)  BP: 148/65 (14 Apr 2022 08:01) (114/75 - 168/83)  BP(mean): --  RR: 19 (14 Apr 2022 04:50) (16 - 19)  SpO2: 97% (14 Apr 2022 04:50) (95% - 100%)    I&O's Summary    13 Apr 2022 07:01  -  14 Apr 2022 07:00  --------------------------------------------------------  IN: 0 mL / OUT: 0 mL / NET: 0 mL          PHYSICAL EXAM:  TELE:   Constitutional: NAD, awake and alert, well-developed  HEENT: Moist Mucous Membranes, Anicteric  Pulmonary: Non-labored, breath sounds are clear bilaterally, No wheezing, crackles or rhonchi  Cardiovascular: Regular, S1 and S2 nl, No murmurs, rubs, gallops or clicks  Gastrointestinal: Bowel Sounds present, soft, nontender.   Lymph: No lymphadenopathy. No peripheral edema.  Skin: No visible rashes or ulcers.  Psych:  Mood & affect appropriate    LABS: All Labs Reviewed:                        8.4    5.99  )-----------( 208      ( 14 Apr 2022 08:28 )             24.7                         9.4    5.91  )-----------( 259      ( 13 Apr 2022 08:46 )             28.1     14 Apr 2022 08:28    140    |  110    |  29     ----------------------------<  112    3.5     |  20     |  2.00   13 Apr 2022 08:46    139    |  107    |  31     ----------------------------<  116    3.9     |  22     |  2.30     Ca    8.6        14 Apr 2022 08:28  Ca    9.6        13 Apr 2022 08:46    TPro  6.5    /  Alb  3.3    /  TBili  0.3    /  DBili  x      /  AST  18     /  ALT  23     /  AlkPhos  73     13 Apr 2022 08:46    PT/INR - ( 13 Apr 2022 08:46 )   PT: 21.7 sec;   INR: 1.84 ratio         PTT - ( 13 Apr 2022 08:46 )  PTT:45.8 sec         ECG:    Echo:    Radiology:         [facility] -- Fifi Bliss, Kalpesh Valdovinos Pannella, Patel, Savella  Office # [phone]      Follow Up:    HTN  Subjective/Observations:   No events overnight resting comfortably in bed.  No complaints of chest pain, dyspnea, or palpitations reported. No signs of orthopnea or PND.  CBI in place     REVIEW OF SYSTEMS: All other review of systems is negative unless indicated above    PAST MEDICAL & SURGICAL HISTORY:  HTN (hypertension)  c/b multiple episodes of hypertensive urgency    HLD (hyperlipidemia)    Atrial fibrillation  first documented on EKG 10/7/2021    CAD (coronary artery disease)  s/p stents (not on plavix)    Type 2 diabetes mellitus  not on home insulin/ Meds    Anxiety  Depression  multiple psych medications    History of diverticulitis  07/2021    Diverticulosis  c/b GIB in 2020    Afib  on AC    Stage 3 chronic kidney disease    Anemia of chronic disease  Monoclonal Gammopathy-MGUS  pRBC transfusion 10/15/21    Chronic diastolic congestive heart failure    Multiple thyroid nodules    H/O: upper GI bleed  4/22, small Bowel Bleed, 5 u pRBC Transfusion 4/22  S/P Colonoscopy- Diverticulosis  S/P Capsule Endoscopy done -[facility] -No active bleeding    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  Had surgery ( April 2013 )    S/P tonsillectomy    S/P arthroscopic knee surgery  Bilateral ( 2005 )    Torsion of testicle  Had surgery at age 14    Pilonidal cyst  Had surgery ( 1969 )    S/P cataract surgery  Bilateral    H/O hernia repair        MEDICATIONS  (STANDING):  amLODIPine   Tablet 10 milliGRAM(s) Oral daily  atorvastatin 10 milliGRAM(s) Oral at bedtime  budesonide  80 MICROgram(s)/formoterol 4.5 MICROgram(s) Inhaler 2 Puff(s) Inhalation two times a day  carvedilol 3.125 milliGRAM(s) Oral every 12 hours  cloNIDine 0.2 milliGRAM(s) Oral two times a day  cyanocobalamin 1000 MICROGram(s) Oral daily  dextrose 5%. 1000 milliLiter(s) (50 mL/Hr) IV Continuous <Continuous>  dextrose 5%. 1000 milliLiter(s) (100 mL/Hr) IV Continuous <Continuous>  dextrose 50% Injectable 25 Gram(s) IV Push once  dextrose 50% Injectable 12.5 Gram(s) IV Push once  dextrose 50% Injectable 25 Gram(s) IV Push once  doxazosin Oral Tab/Cap - Peds 4 milliGRAM(s) Oral two times a day  famotidine    Tablet 40 milliGRAM(s) Oral two times a day  folic acid 1 milliGRAM(s) Oral daily  furosemide    Tablet 40 milliGRAM(s) Oral daily  glucagon  Injectable 1 milliGRAM(s) IntraMuscular once  hydrALAZINE 100 milliGRAM(s) Oral three times a day  insulin lispro (ADMELOG) corrective regimen sliding scale   SubCutaneous three times a day before meals  insulin lispro (ADMELOG) corrective regimen sliding scale   SubCutaneous at bedtime  isosorbide   mononitrate ER Tablet (IMDUR) 30 milliGRAM(s) Oral daily  lactobacillus acidophilus 1 Tablet(s) Oral two times a day  lamoTRIgine 100 milliGRAM(s) Oral daily  pantoprazole    Tablet 40 milliGRAM(s) Oral before breakfast  venlafaxine XR. 150 milliGRAM(s) Oral daily    MEDICATIONS  (PRN):  acetaminophen     Tablet .. 650 milliGRAM(s) Oral every 6 hours PRN Temp greater or equal to 38C (100.4F), Mild Pain (1 - 3)  dextrose Oral Gel 15 Gram(s) Oral once PRN Blood Glucose LESS THAN 70 milliGRAM(s)/deciliter  ondansetron Injectable 4 milliGRAM(s) IV Push every 8 hours PRN Nausea and/or Vomiting  traMADol 50 milliGRAM(s) Oral every 8 hours PRN Moderate Pain (4 - 6)      Allergies    No Known Allergies    Intolerances        Vital Signs Last 24 Hrs  T(C): 36.5 (14 Apr 2022 04:50), Max: 36.7 (13 Apr 2022 14:43)  T(F): 97.7 (14 Apr 2022 04:50), Max: 98 (13 Apr 2022 14:43)  HR: 61 (14 Apr 2022 04:50) (57 - 81)  BP: 148/65 (14 Apr 2022 08:01) (114/75 - 168/83)  BP(mean): --  RR: 19 (14 Apr 2022 04:50) (16 - 19)  SpO2: 97% (14 Apr 2022 04:50) (95% - 100%)    I&O's Summary    13 Apr 2022 07:01  -  14 Apr 2022 07:00  --------------------------------------------------------  IN: 0 mL / OUT: 0 mL / NET: 0 mL          PHYSICAL EXAM:  TELE: not on tele   Constitutional: NAD, awake and alert, obese   HEENT: Moist Mucous Membranes, Anicteric  Pulmonary: Non-labored, breath sounds are clear bilaterally, No wheezing, crackles or rhonchi  Cardiovascular: Regular, S1 and S2 nl, No murmurs, rubs, gallops or clicks  Gastrointestinal: Bowel Sounds present, soft, nontender. CBI   Lymph: + bilateral peripheral edema.  Skin: No visible rashes or ulcers.  Psych:  Mood & affect appropriate    LABS: All Labs Reviewed:                        8.4    5.99  )-----------( 208      ( 14 Apr 2022 08:28 )             24.7                         9.4    5.91  )-----------( 259      ( 13 Apr 2022 08:46 )             28.1     14 Apr 2022 08:28    140    |  110    |  29     ----------------------------<  112    3.5     |  20     |  2.00   13 Apr 2022 08:46    139    |  107    |  31     ----------------------------<  116    3.9     |  22     |  2.30     Ca    8.6        14 Apr 2022 08:28  Ca    9.6        13 Apr 2022 08:46    TPro  6.5    /  Alb  3.3    /  TBili  0.3    /  DBili  x      /  AST  18     /  ALT  23     /  AlkPhos  73     13 Apr 2022 08:46    PT/INR - ( 13 Apr 2022 08:46 )   PT: 21.7 sec;   INR: 1.84 ratio         PTT - ( 13 Apr 2022 08:46 )  PTT:45.8 sec       TTE Echo Complete w/o Contrast w/ Doppler (01.06.22 @ 10:52) >   EXAM:  ECHO TTE WO CON COMP W DOPP         PROCEDURE DATE:  01/06/2022        INTERPRETATION:  INDICATION: Dyspnea  Sonographer PH    Blood Pressure 177/86    Height 172.7 cm     Weight 104.4 kg    Dimensions:  LA 4.2       Normal Values: 2.0 - 4.0 cm  Ao 3.7        Normal Values: 2.0 - 3.8 cm  SEPTUM 1.4       Normal Values: 0.6 - 1.2 cm  PWT 1.4       Normal Values: 0.6 - 1.1 cm  LVIDd 5.2         Normal Values: 3.0 - 5.6 cm  LVIDs 4.2         Normal Values: 1.8 - 4.0 cm      OBSERVATIONS:  Technically difficult study  Mitral Valve: Mitral annular calcification with thickened leaflets, mild   MR.  Aortic Valve/Aorta: Calcified trileaflet aortic valve with decreased   opening. Peak transaortic valve gradient is 29.4 mmHg with a mean   transaortic valve gradient 14.7 mmHg. This consistent with mild aortic   stenosis.  Tricuspid Valve: Mild TR.  Pulmonic Valve: Not well-visualized  Left Atrium: Enlarged  Right Atrium: Not well-visualized  Left Ventricle: Moderate left ventricular hypertrophy with normal   systolic function, estimated LVEF of 55%.  Right Ventricle: Grossly normal size and systolic function.  Pericardium: no significant pericardial effusion.  Pulmonary/RV Pressure: estimated PA systolic pressure of 32mmHg      IMPRESSION:  Technically difficult study  Moderate left ventricular hypertrophy with normal systolic function,   estimated LVEF of 55%.  Grossly normal RV size and systolic function.  Calcified trileaflet aortic valve with mild aortic stenosis  Mild MR andTR.  No significant pericardial effusion.

## 2022-04-14 NOTE — PROGRESS NOTE ADULT - PROBLEM SELECTOR PLAN 2
Chronic, HR stable   Continue Coreg  w/ hold parameters   Holding Eliquis in setting of hematuria   EKG: sinus bradycardia with 1st degree AV block, LVH with QRS widening and repolarization abnormality  Cardio Dr Valdovinos consulted, f/u recs This AM patient had urinary retention, CBI and El placed this AM. Had multiple blood clots  - Hb downtrended this AM 8.4 from 9.4  - recent GI Bleed -small bowel- Jejunum,   pt had Colonoscopy -Diverticulosis & Capsule Endoscopy at [facility] last week -NO active GI bleed    - Will check H/H at 16:00   - Transfused for Hb less than 8  - Continue to monitor with daily CBC  - Holding home Eliquis & ASA -Acute on Chronic Anemia , Recent Upper GI Bleed , Now with Hematuria  -. Had multiple blood clots in Urine   - Hb downtrended this AM 8.4 from 9.4  - recent GI Bleed -small bowel- Jejunum,   pt had Colonoscopy -Diverticulosis & Capsule Endoscopy at [facility] last week -NO active GI bleed.   - Will check H/H at 16:00   - Transfused for Hb less than 8  - daily CBC  - Holding home Eliquis & ASA

## 2022-04-14 NOTE — DISCHARGE NOTE PROVIDER - HOSPITAL COURSE
FROM ADMISSION H+P:   HPI:  78yo M with PMH of Afib (on eliquis), MGUS, depression, CAD s/p stents, HLD, HTN, CKD, DM2, diverticulosis, Anemia , Diastolic  CHF (Echo 1/22 LVEF 55%) and BPH with recent GI Bleed 4/22 with multiple pRBC transfusions recently d/chio from [facility] 4/11/22 presents to the ED with hematuria. Patient states he went to void at 3AM and noticed it was difficult for him to urinate at first, but when he was able to void, he saw a lot of mikayla blood. Patient had 5 episodes of mikayla blood without clots along with dysuria. Denies trauma, falls, penile lesion. Per chart review, patient was admitted to [facility] on 3/30/22 for a rectal bleed and was transfused 4-5 units of pRBC and transferred to [facility] for further workup after a bleeding scan showed a jejunal bleed. During this time, Eliquis was held. Capsule endoscopy revealed possible small bleeding site. Pt's Eliquis was resumed on 4/8 with no reoccurrence of a GIB. Pt last took Eliquis this AM.     Admits to HA, dizziness, nausea, decreased PO intake. Denies fever/chills, chest pain, palpitations, vomiting, constipation, diarrhea, hematochezia.     ED course:   Vitals:  BP:106/71      HR: 61     Temp: 97.2      RR:18     O2: 96%  Labs significant for: H/H 9.4/28.1, PT 21.7, INR 1.84, PTT 45.8, BUN/Cr 31/2.3, glucose 116  EKG: sinus bradycardia with 1st degree AV block, LVH with QRS widening and repolarization abnormality (13 Apr 2022 11:54)      ---  HOSPITAL COURSE: Pt was admitted for hematuria. Hb low stable on admission day, patient complained abdominal distention and anuria overnight placed on CBI. Urology Dr. Kiser consulted, urine cytopathology showed ------------  Eliquis and ASA held. Cardio consulted, who recommend to    Patient is stable for discharge as per primary medical team and consultants.    PT consulted, recommends discharge ______    Patient showed improvement throughout hospitalization. Patient was seen and examined on day of discharge. Patient was medically optimized for discharge with close outpatient folllow up.    VITALS:       PHYSICAL EXAM:    ---  CONSULTANTS:   Urology Dr. Kiser,  Cardio, Dr. Bliss's group   ---  TIME SPENT:  I, the attending physician, was physically present for the key portions of the evaluation and management (E/M) service provided. The total amount of time spent reviewing the hospital notes, laboratory values, imaging findings, assessing/counseling the patient, discussing with consultant physicians, social work, nursing staff was -- minutes    ---  FINAL DISCHARGE DIAGNOSIS LIST:  Please see last daily progress note for final discharge diagnoses    ---  Primary care provider was made aware of plan for discharge:      [  ] NO     [  ] YES   FROM ADMISSION H+P:   HPI:  76yo M with PMH of Afib (on eliquis), MGUS, depression, CAD s/p stents, HLD, HTN, CKD, DM2, diverticulosis, Anemia , Diastolic  CHF (Echo 1/22 LVEF 55%) and BPH with recent GI Bleed 4/22 with multiple pRBC transfusions recently d/chio from [facility] 4/11/22 presents to the ED with hematuria. Patient states he went to void at 3AM and noticed it was difficult for him to urinate at first, but when he was able to void, he saw a lot of mikayla blood. Patient had 5 episodes of mikayla blood without clots along with dysuria. Denies trauma, falls, penile lesion. Per chart review, patient was admitted to [facility] on 3/30/22 for a rectal bleed and was transfused 4-5 units of pRBC and transferred to [facility] for further workup after a bleeding scan showed a jejunal bleed. During this time, Eliquis was held. Capsule endoscopy revealed possible small bleeding site. Pt's Eliquis was resumed on 4/8 with no reoccurrence of a GIB. Pt last took Eliquis this AM.     Admits to HA, dizziness, nausea, decreased PO intake. Denies fever/chills, chest pain, palpitations, vomiting, constipation, diarrhea, hematochezia.     ED course:   Vitals:  BP:106/71      HR: 61     Temp: 97.2      RR:18     O2: 96%  Labs significant for: H/H 9.4/28.1, PT 21.7, INR 1.84, PTT 45.8, BUN/Cr 31/2.3, glucose 116  EKG: sinus bradycardia with 1st degree AV block, LVH with QRS widening and repolarization abnormality (13 Apr 2022 11:54)      ---  HOSPITAL COURSE: Pt was admitted for hematuria. Hb low stable on admission day, patient complained abdominal distention and anuria overnight placed on CBI. Urology Dr. Kiser consulted, urine cytopathology showed neutrophiles and red blood cells.   Eliquis and ASA held.     Patient is stable for discharge as per primary medical team and consultants.    PT consulted, recommends discharge ______    Patient showed improvement throughout hospitalization. Patient was seen and examined on day of discharge. Patient was medically optimized for discharge with close outpatient folllow up.    VITALS:       PHYSICAL EXAM:    ---  CONSULTANTS:   Urology Dr. Kiser,  Cardio, Dr. Bliss's group   ---  TIME SPENT:  I, the attending physician, was physically present for the key portions of the evaluation and management (E/M) service provided. The total amount of time spent reviewing the hospital notes, laboratory values, imaging findings, assessing/counseling the patient, discussing with consultant physicians, social work, nursing staff was -- minutes    ---  FINAL DISCHARGE DIAGNOSIS LIST:  Please see last daily progress note for final discharge diagnoses    ---  Primary care provider was made aware of plan for discharge:      [  ] NO     [  ] YES   FROM ADMISSION H+P:   HPI:  78yo M with PMH of Afib (on eliquis), MGUS, depression, CAD s/p stents, HLD, HTN, CKD, DM2, diverticulosis, Anemia , Diastolic  CHF (Echo 1/22 LVEF 55%) and BPH with recent GI Bleed 4/22 with multiple pRBC transfusions recently d/chio from [facility] 4/11/22 presents to the ED with hematuria. Patient states he went to void at 3AM and noticed it was difficult for him to urinate at first, but when he was able to void, he saw a lot of mikayla blood. Patient had 5 episodes of mikayla blood without clots along with dysuria. Denies trauma, falls, penile lesion. Per chart review, patient was admitted to [facility] on 3/30/22 for a rectal bleed and was transfused 4-5 units of pRBC and transferred to [facility] for further workup after a bleeding scan showed a jejunal bleed. During this time, Eliquis was held. Capsule endoscopy revealed possible small bleeding site. Pt's Eliquis was resumed on 4/8 with no reoccurrence of a GIB. Pt last took Eliquis this AM.     Admits to HA, dizziness, nausea, decreased PO intake. Denies fever/chills, chest pain, palpitations, vomiting, constipation, diarrhea, hematochezia.     ED course:   Vitals:  BP:106/71      HR: 61     Temp: 97.2      RR:18     O2: 96%  Labs significant for: H/H 9.4/28.1, PT 21.7, INR 1.84, PTT 45.8, BUN/Cr 31/2.3, glucose 116  EKG: sinus bradycardia with 1st degree AV block, LVH with QRS widening and repolarization abnormality (13 Apr 2022 11:54)      ---  HOSPITAL COURSE: Pt was admitted for hematuria. Urology Dr. Kiser was consulted. Hb low stable on admission day, patient complained abdominal distention and anuria overnight. Bladder scan was done which revealed >400cc of urine. Per urology recommendations, CBI was done with resolution of clots. Urine cytopathology showed neutrophiles and red blood cells.   Eliquis and ASA held.     Patient is stable for discharge as per primary medical team and consultants.    PT consulted, recommends discharge ______    Patient showed improvement throughout hospitalization. Patient was seen and examined on day of discharge. Patient was medically optimized for discharge with close outpatient folllow up.    VITALS:       PHYSICAL EXAM:    ---  CONSULTANTS:   Urology Dr. Kiser,  Cardio, Dr. Bliss's group   ---  TIME SPENT:  I, the attending physician, was physically present for the key portions of the evaluation and management (E/M) service provided. The total amount of time spent reviewing the hospital notes, laboratory values, imaging findings, assessing/counseling the patient, discussing with consultant physicians, social work, nursing staff was -- minutes    ---  FINAL DISCHARGE DIAGNOSIS LIST:  Please see last daily progress note for final discharge diagnoses    ---  Primary care provider was made aware of plan for discharge:      [  ] NO     [  ] YES   FROM ADMISSION H+P:   HPI:  76yo M with PMH of Afib (on eliquis), MGUS, depression, CAD s/p stents, HLD, HTN, CKD, DM2, diverticulosis, Anemia , Diastolic  CHF (Echo 1/22 LVEF 55%) and BPH with recent GI Bleed 4/22 with multiple pRBC transfusions recently d/chio from [facility] 4/11/22 presents to the ED with hematuria. Patient states he went to void at 3AM and noticed it was difficult for him to urinate at first, but when he was able to void, he saw a lot of mikayla blood. Patient had 5 episodes of mikayla blood without clots along with dysuria. Denies trauma, falls, penile lesion. Per chart review, patient was admitted to [facility] on 3/30/22 for a rectal bleed and was transfused 4-5 units of pRBC and transferred to [facility] for further workup after a bleeding scan showed a jejunal bleed. During this time, Eliquis was held. Capsule endoscopy revealed possible small bleeding site. Pt's Eliquis was resumed on 4/8 with no reoccurrence of a GIB. Pt last took Eliquis this AM.     Admits to HA, dizziness, nausea, decreased PO intake. Denies fever/chills, chest pain, palpitations, vomiting, constipation, diarrhea, hematochezia.     ED course:   Vitals:  BP:106/71      HR: 61     Temp: 97.2      RR:18     O2: 96%  Labs significant for: H/H 9.4/28.1, PT 21.7, INR 1.84, PTT 45.8, BUN/Cr 31/2.3, glucose 116  EKG: sinus bradycardia with 1st degree AV block, LVH with QRS widening and repolarization abnormality (13 Apr 2022 11:54)      ---  HOSPITAL COURSE: Pt was admitted for hematuria. Urology Dr. Kiser was consulted. Hb low stable on admission day, patient complained abdominal distention and anuria overnight. Bladder scan was done which revealed >400cc of urine. Per urology recommendations, CBI was done with resolution of clots. Urine cytopathology showed neutrophiles and red blood cells, negative for malignancy. Eliquis and ASA were held. El & CBI were discontinued on 4/18 once the hematuria resolved. Pt passed TO.  Per Cardiology recommendations, pt is not a good candidate for AC.     Patient is stable for discharge as per primary medical team and consultants.    PT consulted, recommends discharge to home.     Patient showed improvement throughout hospitalization. Patient was seen and examined on day of discharge. Patient was medically optimized for discharge with close outpatient folllow up.    VITALS:       PHYSICAL EXAM:    ---  CONSULTANTS:   Urology Dr. Kiser,  Cardio, Dr. Bliss's group   ---  TIME SPENT:  I, the attending physician, was physically present for the key portions of the evaluation and management (E/M) service provided. The total amount of time spent reviewing the hospital notes, laboratory values, imaging findings, assessing/counseling the patient, discussing with consultant physicians, social work, nursing staff was -- minutes    ---  FINAL DISCHARGE DIAGNOSIS LIST:  Please see last daily progress note for final discharge diagnoses    ---  Primary care provider was made aware of plan for discharge:      [  ] NO     [ x ] YES   FROM ADMISSION H+P:   HPI:  78yo M with PMH of Afib (on eliquis), MGUS, depression, CAD s/p stents, HLD, HTN, CKD, DM2, diverticulosis, Anemia , Diastolic  CHF (Echo 1/22 LVEF 55%) and BPH with recent GI Bleed 4/22 with multiple pRBC transfusions recently d/chio from [facility] 4/11/22 presents to the ED with hematuria. Patient states he went to void at 3AM and noticed it was difficult for him to urinate at first, but when he was able to void, he saw a lot of mikayla blood. Patient had 5 episodes of mikayla blood without clots along with dysuria. Denies trauma, falls, penile lesion. Per chart review, patient was admitted to [facility] on 3/30/22 for a rectal bleed and was transfused 4-5 units of pRBC and transferred to [facility] for further workup after a bleeding scan showed a jejunal bleed. During this time, Eliquis was held. Capsule endoscopy revealed possible small bleeding site. Pt's Eliquis was resumed on 4/8 with no reoccurrence of a GIB. Pt last took Eliquis this AM.     Admits to HA, dizziness, nausea, decreased PO intake. Denies fever/chills, chest pain, palpitations, vomiting, constipation, diarrhea, hematochezia.     ED course:   Vitals:  BP:106/71      HR: 61     Temp: 97.2      RR:18     O2: 96%  Labs significant for: H/H 9.4/28.1, PT 21.7, INR 1.84, PTT 45.8, BUN/Cr 31/2.3, glucose 116  EKG: sinus bradycardia with 1st degree AV block, LVH with QRS widening and repolarization abnormality (13 Apr 2022 11:54)      ---  HOSPITAL COURSE: Pt was admitted for hematuria. Urology Dr. Kiser was consulted. Hb low stable on admission day, patient complained abdominal distention and anuria overnight. Bladder scan was done which revealed >400cc of urine. Per urology recommendations, CBI was done with resolution of clots. Urine cytopathology showed neutrophiles and red blood cells, negative for malignancy. Eliquis and ASA were held. El & CBI were discontinued on 4/18 once the hematuria resolved. Pt passed TO.  Per Cardiology recommendations, pt is not a good candidate for AC. Will restart ASA in 4 days upon d/c from hospital with close outpatient followup.     Patient is stable for discharge as per primary medical team and consultants.    PT consulted, recommends discharge to home.     Patient showed improvement throughout hospitalization. Patient was seen and examined on day of discharge. Patient was medically optimized for discharge with close outpatient folllow up.    VITALS:       PHYSICAL EXAM:    ---  CONSULTANTS:   Urology Dr. Kiser,  Cardio, Dr. Bliss's group   ---  TIME SPENT:  I, the attending physician, was physically present for the key portions of the evaluation and management (E/M) service provided. The total amount of time spent reviewing the hospital notes, laboratory values, imaging findings, assessing/counseling the patient, discussing with consultant physicians, social work, nursing staff was -- minutes    ---  FINAL DISCHARGE DIAGNOSIS LIST:  Please see last daily progress note for final discharge diagnoses    ---  Primary care provider was made aware of plan for discharge:      [  ] NO     [ x ] YES   FROM ADMISSION H+P:   HPI:  76yo M with PMH of Afib (on eliquis), MGUS, depression, CAD s/p stents, HLD, HTN, CKD, DM2, diverticulosis, Anemia , Diastolic  CHF (Echo 1/22 LVEF 55%) and BPH with recent GI Bleed 4/22 with multiple pRBC transfusions recently d/chio from [facility] 4/11/22 presents to the ED with hematuria. Patient states he went to void at 3AM and noticed it was difficult for him to urinate at first, but when he was able to void, he saw a lot of mikayla blood. Patient had 5 episodes of mikayla blood without clots along with dysuria. Denies trauma, falls, penile lesion. Per chart review, patient was admitted to [facility] on 3/30/22 for a rectal bleed and was transfused 4-5 units of pRBC and transferred to [facility] for further workup after a bleeding scan showed a jejunal bleed. During this time, Eliquis was held. Capsule endoscopy revealed possible small bleeding site. Pt's Eliquis was resumed on 4/8 with no reoccurrence of a GIB. Pt last took Eliquis this AM.     Admits to HA, dizziness, nausea, decreased PO intake. Denies fever/chills, chest pain, palpitations, vomiting, constipation, diarrhea, hematochezia.     ED course:   Vitals:  BP:106/71      HR: 61     Temp: 97.2      RR:18     O2: 96%  Labs significant for: H/H 9.4/28.1, PT 21.7, INR 1.84, PTT 45.8, BUN/Cr 31/2.3, glucose 116  EKG: sinus bradycardia with 1st degree AV block, LVH with QRS widening and repolarization abnormality (13 Apr 2022 11:54)      ---  HOSPITAL COURSE: Pt was admitted for hematuria. Urology Dr. Kiser was consulted. Hb low stable on admission day, patient complained abdominal distention and anuria overnight. Bladder scan was done which revealed >400cc of urine. Per urology recommendations, CBI was done with resolution of clots. Urine cytopathology showed neutrophiles and red blood cells, negative for malignancy. Eliquis and ASA were held. El & CBI were discontinued on 4/18 once the hematuria resolved. Pt passed TO.  Per Cardiology recommendations, pt is not a good candidate for AC. Will restart ASA in 4 days upon d/c from hospital with close outpatient followup.  We discontinued home metformin due to A1C 5.8 on 03/22 and also CKD. PT consulted, recommends no home skill PT need     Patient showed improvement throughout hospitalization. Patient was seen and examined on day of discharge. Patient was medically optimized for discharge with close outpatient folllow up.    ---  CONSULTANTS:   Urology Dr. Kiser,  Cardio, Dr. Bliss's group   PT    ---  TIME SPENT:  I, the attending physician, was physically present for the key portions of the evaluation and management (E/M) service provided. The total amount of time spent reviewing the hospital notes, laboratory values, imaging findings, assessing/counseling the patient, discussing with consultant physicians, social work, nursing staff was -- minutes    ---  FINAL DISCHARGE DIAGNOSIS LIST:  Please see last daily progress note for final discharge diagnoses    ---  Primary care provider was made aware of plan for discharge:      [  ] NO     [ x ] YES   FROM ADMISSION H+P:   HPI:  78yo M with PMH of Afib (on eliquis), MGUS, depression, CAD s/p stents, HLD, HTN, CKD, DM2, diverticulosis, Anemia , Diastolic  CHF (Echo 1/22 LVEF 55%) and BPH with recent GI Bleed 4/22 with multiple pRBC transfusions recently d/chio from [facility] 4/11/22 presents to the ED with hematuria. Patient states he went to void at 3AM and noticed it was difficult for him to urinate at first, but when he was able to void, he saw a lot of mikayla blood. Patient had 5 episodes of mikayla blood without clots along with dysuria. Denies trauma, falls, penile lesion. Per chart review, patient was admitted to [facility] on 3/30/22 for a rectal bleed and was transfused 4-5 units of pRBC and transferred to [facility] for further workup after a bleeding scan showed a jejunal bleed. During this time, Eliquis was held. Capsule endoscopy revealed possible small bleeding site. Pt's Eliquis was resumed on 4/8 with no reoccurrence of a GIB. Pt last took Eliquis this AM.     Admits to HA, dizziness, nausea, decreased PO intake. Denies fever/chills, chest pain, palpitations, vomiting, constipation, diarrhea, hematochezia.     ED course:   Vitals:  BP:106/71      HR: 61     Temp: 97.2      RR:18     O2: 96%  Labs significant for: H/H 9.4/28.1, PT 21.7, INR 1.84, PTT 45.8, BUN/Cr 31/2.3, glucose 116  EKG: sinus bradycardia with 1st degree AV block, LVH with QRS widening and repolarization abnormality (13 Apr 2022 11:54)      ---  HOSPITAL COURSE: Pt was admitted for hematuria. Urology Dr. Kiser was consulted. Hb low stable on admission day, patient complained abdominal distention and anuria overnight. Bladder scan was done which revealed >400cc of urine. Per urology recommendations, CBI was done with resolution of clots. Urine cytopathology showed neutrophiles and red blood cells, negative for malignancy. Eliquis and ASA were held. El & CBI were discontinued on 4/18 once the hematuria resolved. Pt passed TO.  Per Cardiology recommendations, pt is not a good candidate for AC. Will restart ASA in 4 days upon d/c from hospital with close outpatient followup.  We discontinued home metformin due to A1C 5.8 on 03/22 and also CKD. PT consulted, recommends no home skill PT need     Patient showed improvement throughout hospitalization. Patient was seen and examined on day of discharge. Patient was medically optimized for discharge with close outpatient folllow up.    ---  CONSULTANTS:   Urology Dr. Kiser,  Cardio, Dr. Bliss's group   PT    ---  TIME SPENT:  I, the attending physician, was physically present for the key portions of the evaluation and management (E/M) service provided. The total amount of time spent reviewing the hospital notes, laboratory values, imaging findings, assessing/counseling the patient, discussing with consultant physicians, social work, nursing staff was 60 minutes    ---  FINAL DISCHARGE DIAGNOSIS LIST:  Please see last daily progress note for final discharge diagnoses    ---  Primary care provider was made aware of plan for discharge:      [  ] NO     [ x ] YES

## 2022-04-14 NOTE — PROGRESS NOTE ADULT - SUBJECTIVE AND OBJECTIVE BOX
Patient is a 77y old  Male who presents with a chief complaint of Hematuria (13 Apr 2022 16:16)    HPI:  76yo M with PMH of Afib (on eliquis), MGUS, depression, CAD s/p stents, HLD, HTN, CKD, DM2, diverticulosis, Anemia , Diastolic  CHF (Echo 1/22 LVEF 55%) and BPH with recent GI Bleed 4/22 with multiple pRBC transfusions recently d/chio from [facility] 4/11/22 presents to the ED with hematuria. Patient states he went to void at 3AM and noticed it was difficult for him to urinate at first, but when he was able to void, he saw a lot of mikayla blood. Patient had 5 episodes of mikayla blood without clots along with dysuria. Denies trauma, falls, penile lesion. Per chart review, patient was admitted to [facility] on 3/30/22 for a rectal bleed and was transfused 4-5 units of pRBC and transferred to [facility] for further workup after a bleeding scan showed a jejunal bleed. During this time, Eliquis was held. Capsule endoscopy revealed possible small bleeding site. Pt's Eliquis was resumed on 4/8 with no reoccurrence of a GIB. Pt last took Eliquis this AM.     Admits to HA, dizziness, nausea, decreased PO intake. Denies fever/chills, chest pain, palpitations, vomiting, constipation, diarrhea, hematochezia.     ED course:   Vitals:  BP:106/71      HR: 61     Temp: 97.2      RR:18     O2: 96%  Labs significant for: H/H 9.4/28.1, PT 21.7, INR 1.84, PTT 45.8, BUN/Cr 31/2.3, glucose 116  EKG: sinus bradycardia with 1st degree AV block, LVH with QRS widening and repolarization abnormality (13 Apr 2022 11:54)    INTERVAL HPI:  4/14/22: Pt seen and examined at bedside. Complaining of 9/10 lower abdominal pain, nausea, as well as lots of mikayla blood loss from the penis. Bladder scan revealed >400cc of urine, pt attempted to use the bathroom but was actively bleeding. Urology Dr. Kiser spoke to the surgical PA, irrigated bladder and removed a copious amount of clots. CBI was ordered. Pt also complaining of b/l knee pain & headache. Denies fever/chills, chest pain, palpitations, vomiting/ constipation/ diarrhea/ hematochezia. Hgb dropped from 9.4 to 8.4, but otherwise hemodynamically stable.     OVERNIGHT EVENTS:  Per the pt, he was anuric overnight, only passed a lot of mikayla blood in the toilet.     Home Medications:  aspirin 81 mg oral tablet: 1 tab(s) orally once a day (13 Apr 2022 15:41)  budesonide-formoterol 80 mcg-4.5 mcg/inh inhalation aerosol: 2 puff(s) inhaled 2 times a day (13 Apr 2022 15:41)  cloNIDine 0.2 mg oral tablet: 1 tab(s) orally 2 times a day (13 Apr 2022 15:41)  diclofenac 1% topical gel: Apply topically to affected area 3 times a day (13 Apr 2022 15:41)  doxazosin 4 mg oral tablet: 1 tab(s) orally 2 times a day (13 Apr 2022 15:41)  Eliquis 5 mg oral tablet: 1 tab(s) orally 2 times a day (13 Apr 2022 15:41)  famotidine 40 mg oral tablet: 1 tab(s) orally 2 times a day (13 Apr 2022 15:41)  isosorbide mononitrate 30 mg oral tablet, extended release: 1 tab(s) orally once a day (in the morning) (13 Apr 2022 15:41)  lactobacillus acidophilus oral capsule: 1 cap(s) orally 2 times a day (13 Apr 2022 15:41)  lamoTRIgine 100 mg oral tablet: 1 tab(s) orally once a day (13 Apr 2022 15:41)  metFORMIN 500 mg oral tablet: 1 tab(s) orally 2 times a day (13 Apr 2022 15:41)  oxybutynin 15 mg/24 hr oral tablet, extended release: 1 tab(s) orally once a day (13 Apr 2022 15:41)  potassium chloride 20 mEq oral tablet, extended release: 1 tab(s) orally once a day (13 Apr 2022 15:41)  Vitamin B-12 1000 mcg oral tablet: 1 tab(s) orally once a day (13 Apr 2022 15:41)      MEDICATIONS  (STANDING):  amLODIPine   Tablet 10 milliGRAM(s) Oral daily  atorvastatin 10 milliGRAM(s) Oral at bedtime  budesonide  80 MICROgram(s)/formoterol 4.5 MICROgram(s) Inhaler 2 Puff(s) Inhalation two times a day  carvedilol 3.125 milliGRAM(s) Oral every 12 hours  cloNIDine 0.2 milliGRAM(s) Oral two times a day  cyanocobalamin 1000 MICROGram(s) Oral daily  dextrose 5%. 1000 milliLiter(s) (50 mL/Hr) IV Continuous <Continuous>  dextrose 5%. 1000 milliLiter(s) (100 mL/Hr) IV Continuous <Continuous>  dextrose 50% Injectable 25 Gram(s) IV Push once  dextrose 50% Injectable 12.5 Gram(s) IV Push once  dextrose 50% Injectable 25 Gram(s) IV Push once  doxazosin Oral Tab/Cap - Peds 4 milliGRAM(s) Oral two times a day  famotidine    Tablet 40 milliGRAM(s) Oral two times a day  folic acid 1 milliGRAM(s) Oral daily  furosemide    Tablet 40 milliGRAM(s) Oral daily  glucagon  Injectable 1 milliGRAM(s) IntraMuscular once  hydrALAZINE 100 milliGRAM(s) Oral three times a day  insulin lispro (ADMELOG) corrective regimen sliding scale   SubCutaneous three times a day before meals  insulin lispro (ADMELOG) corrective regimen sliding scale   SubCutaneous at bedtime  isosorbide   mononitrate ER Tablet (IMDUR) 30 milliGRAM(s) Oral daily  lactobacillus acidophilus 1 Tablet(s) Oral two times a day  lamoTRIgine 100 milliGRAM(s) Oral daily  pantoprazole    Tablet 40 milliGRAM(s) Oral before breakfast  venlafaxine XR. 150 milliGRAM(s) Oral daily    MEDICATIONS  (PRN):  acetaminophen     Tablet .. 650 milliGRAM(s) Oral every 6 hours PRN Temp greater or equal to 38C (100.4F), Mild Pain (1 - 3)  dextrose Oral Gel 15 Gram(s) Oral once PRN Blood Glucose LESS THAN 70 milliGRAM(s)/deciliter  ondansetron Injectable 4 milliGRAM(s) IV Push every 8 hours PRN Nausea and/or Vomiting      Allergies    No Known Allergies    Intolerances        Social History:  Tobacco: quit 45 years ago, former 10 pack years smoker   EtOH: denies  recreational drug use: denies  Lives with: alone  Ambulates:  independent  ADLs: independent  Occupation: retired  Vaccinations: Moderna x2 (13 Apr 2022 11:54)      REVIEW OF SYSTEMS:  CONSTITUTIONAL: No fever, No chills, No fatigue, No myalgia, No Body ache, No Weakness  EYES: No eye pain,  No visual disturbances, No discharge, NO Redness  ENMT:  No ear pain, No nose bleed, No vertigo; No sinus pain, NO throat pain, No Congestion  NECK: No pain, No stiffness  RESPIRATORY: No cough, NO wheezing, No  hemoptysis, NO  shortness of breath  CARDIOVASCULAR: No chest pain, palpitations  GASTROINTESTINAL: Admits to abdominal pain, NO epigastric pain. No nausea, No vomiting; No diarrhea, No constipation. [x ] BM  GENITOURINARY: Admits to dysuria, anuria/oliguria, mikayla blood output, No frequency, No urgency, NO incontinence  NEUROLOGICAL: Admits to headaches, No dizziness, No numbness, No tingling, No tremors, No weakness  EXT: No Swelling, No Pain, No Edema  SKIN:  [  x] No itching, burning, rashes, or lesions   MUSCULOSKELETAL: Admits to b/l knee pain,No Jt swelling; No muscle pain, No back pain  PSYCHIATRIC: No depression,  No anxiety,  No mood swings ,No difficulty sleeping at night  PAIN SCALE: [  ] None  [x  ] Other-9/10 abdominal pain   ROS Unable to obtain due to - [  ] Dementia  [  ] Lethargy [  ] Drowsy [  ] Sedated [  ] non verbal  REST OF REVIEW Of SYSTEM - [ x ] Normal     Vital Signs Last 24 Hrs  T(C): 36.5 (14 Apr 2022 04:50), Max: 36.7 (13 Apr 2022 14:43)  T(F): 97.7 (14 Apr 2022 04:50), Max: 98 (13 Apr 2022 14:43)  HR: 61 (14 Apr 2022 04:50) (57 - 81)  BP: 148/65 (14 Apr 2022 08:01) (114/75 - 168/83)  BP(mean): --  RR: 19 (14 Apr 2022 04:50) (16 - 19)  SpO2: 97% (14 Apr 2022 04:50) (95% - 100%)  Finger Stick        04-13 @ 07:01  -  04-14 @ 07:00  --------------------------------------------------------  IN: 0 mL / OUT: 0 mL / NET: 0 mL        PHYSICAL EXAM:  GENERAL:  [ x ] NAD--appears slightly anxious , [  ] well appearing, [  ] Agitated, [  ] Drowsy,  [  ] Lethargy, [  ] confused   HEAD:  [ x ] Normal, [  ] Other  EYES:  [  ] EOMI, [  ] PERRLA, [x  ] conjunctiva and sclera clear normal, [  ] Other,  [  ] Pallor,[  ] Discharge  ENMT:  [x  ] Normal, [x  ] Moist mucous membranes, [  ] Good dentition, [  ] No Thrush  NECK:  [x  ] Supple, [ x ] No JVD, [  x] Normal thyroid, [  ] Lymphadenopathy [  ] Other  CHEST/LUNG:  [ x ] Clear to auscultation bilaterally, [ x ] Breath Sounds equal B/L, [  ] poor effort  [ x ] No rales, [x  ] No rhonchi  [x  ]  No wheezing,   HEART:  [ x ] Regular rate and rhythm, [  ] tachycardia, [  ] Bradycardia,  [  ] irregular  [ x ] No murmurs, No rubs, No gallops, [ x ] PPM in place (Mfr:  )  ABDOMEN:  Tender to palpation, bladder distended  [ x ] Soft, [ ] Nontender, [  ] Nondistended, [ x ] No mass, [ x ] Bowel sounds present, [  ] obese  NERVOUS SYSTEM:  [  x] Alert & Oriented X3, [  x] Nonfocal  [  ] Confusion  [  ] Encephalopathic [  ] Sedated [  ] Unable to assess, [  ] Dementia [  ] Other-  EXTREMITIES: [ x ] 2+ Peripheral Pulses, No clubbing, No cyanosis,  [  ] edema B/L lower EXT. [  ] PVD stasis skin changes B/L Lower EXT, [  ] wound  LYMPH: No lymphadenopathy noted  SKIN:  [ x ] No rashes or lesions, [  ] Pressure Ulcers, [  ] ecchymosis, [  ] Skin Tears, [  ] Other    DIET: Diet, DASH/TLC:   Sodium & Cholesterol Restricted (04-13-22 @ 12:27)      LABS:                        8.4    5.99  )-----------( 208      ( 14 Apr 2022 08:28 )             24.7     14 Apr 2022 08:28    140    |  110    |  29     ----------------------------<  112    3.5     |  20     |  2.00     Ca    8.6        14 Apr 2022 08:28      PT/INR - ( 13 Apr 2022 08:46 )   PT: 21.7 sec;   INR: 1.84 ratio         PTT - ( 13 Apr 2022 08:46 )  PTT:45.8 sec                         Anemia Panel:      Thyroid Panel:                RADIOLOGY & ADDITIONAL TESTS:      HEALTH ISSUES - PROBLEM Dx:  Hematuria    Afib    GIB (gastrointestinal bleeding)    Anemia    CAD (coronary artery disease)    Acute kidney injury superimposed on CKD    Chronic CHF    HTN (hypertension)    HLD (hyperlipidemia)    DM (diabetes mellitus)    BPH (benign prostatic hyperplasia)    Anxiety and depression    DVT prophylaxis            Consultant(s) Notes Reviewed:  [ x ] YES     Care Discussed with [X] Consultants  [  ] Patient  [  ] Family [  ] HCP [  ]   [  ] Social Service  [  ] RN, [  ] Physical Therapy,[  ] Palliative care team  DVT PPX: [  ] Lovenox, [  ] S C Heparin, [  ] Coumadin, [  ] Xarelto, [  ] Eliquis, [  ] Pradaxa, [  ] IV Heparin drip, [ x ] SCD [  ] Contraindication 2 to GI Bleed,[  ] Ambulation [ x ] Contraindicated 2 to  bleed [  ] Contraindicated 2 to Brain Bleed  Advanced directive: [ x ] None, [  ] DNR/DNI

## 2022-04-14 NOTE — DISCHARGE NOTE PROVIDER - NSDCACTIVITY_GEN_ALL_CORE
No heavy lifting/straining Bathing allowed/Do not drive or operate machinery/Walking - Indoors allowed/No heavy lifting/straining

## 2022-04-14 NOTE — DISCHARGE NOTE PROVIDER - NSDCMRMEDTOKEN_GEN_ALL_CORE_FT
acetaminophen 325 mg oral tablet: 2 tab(s) orally every 6 hours, As needed, Temp greater or equal to 38C (100.4F), Mild Pain (1 - 3)  amLODIPine 10 mg oral tablet: 1 tab(s) orally once a day  aspirin 81 mg oral tablet: 1 tab(s) orally once a day  atorvastatin 10 mg oral tablet: 1 tab(s) orally once a day (at bedtime)  budesonide-formoterol 80 mcg-4.5 mcg/inh inhalation aerosol: 2 puff(s) inhaled 2 times a day  carvedilol 3.125 mg oral tablet: 1 tab(s) orally every 12 hours MDD:2  cloNIDine 0.2 mg oral tablet: 1 tab(s) orally 2 times a day  diclofenac 1% topical gel: Apply topically to affected area 3 times a day  doxazosin 4 mg oral tablet: 1 tab(s) orally 2 times a day  famotidine 40 mg oral tablet: 1 tab(s) orally 2 times a day  folic acid 1 mg oral tablet: 1 tab(s) orally once a day  furosemide 40 mg oral tablet: 1 tab(s) orally once a day MDD:1  hydrALAZINE 100 mg oral tablet: 1 tab(s) orally 3 times a day  isosorbide mononitrate 30 mg oral tablet, extended release: 1 tab(s) orally once a day (in the morning)  lactobacillus acidophilus oral capsule: 1 cap(s) orally 2 times a day  lamoTRIgine 100 mg oral tablet: 1 tab(s) orally once a day  metFORMIN 500 mg oral tablet: 1 tab(s) orally 2 times a day  oxybutynin 15 mg/24 hr oral tablet, extended release: 1 tab(s) orally once a day  potassium chloride 20 mEq oral tablet, extended release: 1 tab(s) orally once a day  venlafaxine 150 mg oral capsule, extended release: 1 cap(s) orally once a day  Vitamin B-12 1000 mcg oral tablet: 1 tab(s) orally once a day   acetaminophen 325 mg oral tablet: 2 tab(s) orally every 6 hours, As needed, Temp greater or equal to 38C (100.4F), Mild Pain (1 - 3)  amLODIPine 10 mg oral tablet: 1 tab(s) orally once a day  aspirin 81 mg oral tablet: 1 tab(s) orally once a day  atorvastatin 10 mg oral tablet: 1 tab(s) orally once a day (at bedtime)  budesonide-formoterol 80 mcg-4.5 mcg/inh inhalation aerosol: 2 puff(s) inhaled 2 times a day  carvedilol 3.125 mg oral tablet: 1 tab(s) orally every 12 hours MDD:2  cloNIDine 0.2 mg oral tablet: 1 tab(s) orally 2 times a day  diclofenac 1% topical gel: Apply topically to affected area 3 times a day  doxazosin 4 mg oral tablet: 1 tab(s) orally 2 times a day  famotidine 40 mg oral tablet: 1 tab(s) orally 2 times a day  folic acid 1 mg oral tablet: 1 tab(s) orally once a day  furosemide 40 mg oral tablet: 1 tab(s) orally once a day MDD:1  hydrALAZINE 100 mg oral tablet: 1 tab(s) orally 3 times a day  isosorbide mononitrate 60 mg oral tablet, extended release: 1 tab(s) orally once a day  lactobacillus acidophilus oral capsule: 1 cap(s) orally 2 times a day  lamoTRIgine 100 mg oral tablet: 1 tab(s) orally once a day  metFORMIN 500 mg oral tablet: 1 tab(s) orally 2 times a day  oxybutynin 15 mg/24 hr oral tablet, extended release: 1 tab(s) orally once a day  potassium chloride 20 mEq oral tablet, extended release: 1 tab(s) orally once a day  senna oral tablet: 2 tab(s) orally once a day (at bedtime), As Needed Hold for Loose stools  venlafaxine 150 mg oral capsule, extended release: 1 cap(s) orally once a day  Vitamin B-12 1000 mcg oral tablet: 1 tab(s) orally once a day   acetaminophen 325 mg oral tablet: 2 tab(s) orally every 6 hours, As needed, Temp greater or equal to 38C (100.4F), Mild Pain (1 - 3)  amLODIPine 10 mg oral tablet: 1 tab(s) orally once a day  aspirin 81 mg oral tablet: 1 tab(s) orally once a day  atorvastatin 10 mg oral tablet: 1 tab(s) orally once a day (at bedtime)  budesonide-formoterol 80 mcg-4.5 mcg/inh inhalation aerosol: 2 puff(s) inhaled 2 times a day  carvedilol 3.125 mg oral tablet: 1 tab(s) orally every 12 hours MDD:2  ClearLax oral powder for reconstitution: 17 gram(s) orally once a day -for constipation MDD:1   cloNIDine 0.2 mg oral tablet: 1 tab(s) orally 2 times a day  diclofenac 1% topical gel: Apply topically to affected area 3 times a day  doxazosin 4 mg oral tablet: 1 tab(s) orally 2 times a day  Dulcolax Laxative 5 mg oral delayed release tablet: 1 tab(s) orally once a day -for constipation MDD:1 tablet  famotidine 40 mg oral tablet: 1 tab(s) orally 2 times a day  folic acid 1 mg oral tablet: 1 tab(s) orally once a day  furosemide 40 mg oral tablet: 1 tab(s) orally once a day MDD:1  hydrALAZINE 100 mg oral tablet: 1 tab(s) orally 3 times a day  isosorbide mononitrate 60 mg oral tablet, extended release: 1 tab(s) orally once a day MDD:1 tablet  lactobacillus acidophilus oral capsule: 1 cap(s) orally 2 times a day  lamoTRIgine 100 mg oral tablet: 1 tab(s) orally once a day  metFORMIN 500 mg oral tablet: 1 tab(s) orally 2 times a day  oxybutynin 15 mg/24 hr oral tablet, extended release: 1 tab(s) orally once a day  potassium chloride 20 mEq oral tablet, extended release: 1 tab(s) orally once a day  senna oral tablet: 2 tab(s) orally once a day (at bedtime), As Needed Hold for Loose stools MDD:2 tablets  venlafaxine 150 mg oral capsule, extended release: 1 cap(s) orally once a day  Vitamin B-12 1000 mcg oral tablet: 1 tab(s) orally once a day   amLODIPine 10 mg oral tablet: 1 tab(s) orally once a day  aspirin 81 mg oral tablet: 1 tab(s) orally once a day  Start After 3 day start on 4/ 23/22  atorvastatin 10 mg oral tablet: 1 tab(s) orally once a day (at bedtime)  budesonide-formoterol 80 mcg-4.5 mcg/inh inhalation aerosol: 2 puff(s) inhaled 2 times a day  carvedilol 3.125 mg oral tablet: 1 tab(s) orally every 12 hours MDD:2  ClearLax oral powder for reconstitution: 17 gram(s) orally once a day -for constipation MDD:1   cloNIDine 0.2 mg oral tablet: 1 tab(s) orally 2 times a day  diclofenac 1% topical gel: Apply topically to affected area 3 times a day  doxazosin 4 mg oral tablet: 1 tab(s) orally 2 times a day  Dulcolax Laxative 5 mg oral delayed release tablet: 1 tab(s) orally once a day -for constipation MDD:1 tablet  famotidine 40 mg oral tablet: 1 tab(s) orally 2 times a day  folic acid 1 mg oral tablet: 1 tab(s) orally once a day  furosemide 40 mg oral tablet: 1 tab(s) orally once a day MDD:1  hydrALAZINE 100 mg oral tablet: 1 tab(s) orally 3 times a day  isosorbide mononitrate 60 mg oral tablet, extended release: 1 tab(s) orally once a day MDD:1 tablet  lactobacillus acidophilus oral capsule: 1 cap(s) orally 2 times a day  lamoTRIgine 100 mg oral tablet: 1 tab(s) orally once a day  metFORMIN 500 mg oral tablet: 1 tab(s) orally once a day  oxybutynin 15 mg/24 hr oral tablet, extended release: 1 tab(s) orally once a day  potassium chloride 20 mEq oral tablet, extended release: 1 tab(s) orally once a day  please check BMP in 1 week to check Potassium level  senna oral tablet: 2 tab(s) orally once a day (at bedtime), As Needed Hold for Loose stools MDD:2 tablets  venlafaxine 150 mg oral capsule, extended release: 1 cap(s) orally once a day  Vitamin B-12 1000 mcg oral tablet: 1 tab(s) orally once a day   amLODIPine 10 mg oral tablet: 1 tab(s) orally once a day  aspirin 81 mg oral tablet: 1 tab(s) orally once a day  Start After 3 day start on 4/ 24/22  atorvastatin 10 mg oral tablet: 1 tab(s) orally once a day (at bedtime)  budesonide-formoterol 80 mcg-4.5 mcg/inh inhalation aerosol: 2 puff(s) inhaled 2 times a day  carvedilol 3.125 mg oral tablet: 1 tab(s) orally every 12 hours MDD:2  ClearLax oral powder for reconstitution: 17 gram(s) orally once a day -for constipation MDD:1   cloNIDine 0.2 mg oral tablet: 1 tab(s) orally 2 times a day  diclofenac 1% topical gel: Apply topically to affected area 3 times a day  doxazosin 4 mg oral tablet: 1 tab(s) orally 2 times a day  Dulcolax Laxative 5 mg oral delayed release tablet: 1 tab(s) orally once a day -for constipation MDD:1 tablet  famotidine 40 mg oral tablet: 1 tab(s) orally 2 times a day  folic acid 1 mg oral tablet: 1 tab(s) orally once a day  furosemide 40 mg oral tablet: 1 tab(s) orally once a day MDD:1  hydrALAZINE 100 mg oral tablet: 1 tab(s) orally 3 times a day  isosorbide mononitrate 60 mg oral tablet, extended release: 1 tab(s) orally once a day MDD:1 tablet  lactobacillus acidophilus oral capsule: 1 cap(s) orally 2 times a day  lamoTRIgine 100 mg oral tablet: 1 tab(s) orally once a day  oxybutynin 15 mg/24 hr oral tablet, extended release: 1 tab(s) orally once a day  potassium chloride 20 mEq oral tablet, extended release: 1 tab(s) orally once a day  please check BMP in 1 week to check Potassium level  senna oral tablet: 2 tab(s) orally once a day (at bedtime), As Needed Hold for Loose stools MDD:2 tablets  venlafaxine 150 mg oral capsule, extended release: 1 cap(s) orally once a day  Vitamin B-12 1000 mcg oral tablet: 1 tab(s) orally once a day

## 2022-04-14 NOTE — DISCHARGE NOTE PROVIDER - NPI NUMBER (FOR SYSADMIN USE ONLY) :
[[id number] [[id number] [[id number]]

## 2022-04-14 NOTE — DISCHARGE NOTE PROVIDER - NSDCFUADDAPPT_GEN_ALL_CORE_FT
Please make the appointments with your PCP and Urology.  Please make the appointments with your primary care Dr. Man, Cardiology Dr. Sheridan and Urology Dr. Kiser   STOP ELIQUIS due to bleeding, follow up with cardiology for new recommendations   REASSUME ASPIRIN on 04/27/22   STOP METFORMIN, you don't need medications for diabetes, follow up a healthy diet and regular exercises   TAKE LAXATIVE (SENNA, MIRALAX OR DULCOLAX) for constipation   Patient or caretaker is responsible to make all appointments.   IT IS REALLY IMPORTANT TO FOLLOW UP INSTRUCTIONS FOR MEDICATIONS   If you can urinate or see blood in the urine or stool you have to come back to the ED.

## 2022-04-14 NOTE — PROGRESS NOTE ADULT - PROBLEM SELECTOR PLAN 5
Chronic, history of stents  - Continue Coreg,  Atorvastatin and Imdur  -Hold Eliquis and ASA in setting of hematuria Pt recently admitted to [facility] for GIB,  got 5 u pRBC in total at [facility]   -Pt was transferred to [facility] for Capsule endoscopy  and d/c'd 4/11  -Denies GIB since hospitalization  -Started Pantoprazole qd  -Continue home Pepcid  -Continue to monitor for signs/symptoms of GIB

## 2022-04-14 NOTE — DISCHARGE NOTE PROVIDER - CARE PROVIDER_API CALL
Erich Man)  Medicine  [address] [place] [address]  Phone: [phone]  Fax: [phone]  Follow Up Time: 1 week    Madhavi Kiser)  Urology  [address] [place] [address], [address]  [place] [address]  Phone: [phone]  Fax: [phone]  Follow Up Time: 1 week   Erich Man)  Medicine  [address] [place] [address]  Phone: [phone]  Fax: [phone]  Established Patient  Follow Up Time: 2 weeks    Madhavi Kiser)  Urology  [address] [place] [address], [address]  [place] [address]  Phone: [phone]  Fax: [phone]  Follow Up Time: 1 week   Erich Man)  Medicine  [address] [place] [address]  Phone: [phone]  Fax: [phone]  Established Patient  Follow Up Time: 2 weeks    Madhavi Kiser)  Urology  [address] [place] [address], [address]  [place] [address]  Phone: [phone]  Fax: [phone]  Follow Up Time: 1 week    Bello Sheridan)  [facility]  [address] [place] [address]  Phone: [phone]  Fax: [phone]  Follow Up Time: 1 week   Erich Man)  Medicine  [address] [place] [address]  Phone: [phone]  Fax: [phone]  Established Patient  Follow Up Time: 2 weeks    Madhavi Kiser)  Urology  [address] [place] [address], [address]  [place] [address]  Phone: [phone]  Fax: [phone]  Follow Up Time: 1 week    Bello Sheridan)  Cardio [facility]  [address] [place] [address]  Phone: [phone]  Fax: [phone]  Follow Up Time: 1 week    Isrrael Rodriguez)  Hematology; Internal Medicine; Medical Oncology  [address] [facility], [address]  [place] [address]  Phone: [phone]  Fax: [phone]  Follow Up Time: 2 weeks    Rafat Rodarte  MEDICINE  [address] [facility], [address]  [place] [address]  Phone: [phone]  Fax: [phone]  Follow Up Time:

## 2022-04-14 NOTE — PROGRESS NOTE ADULT - PROBLEM SELECTOR PLAN 10
Diabetes type II   Hold oral hypoglycemic meds  Insulin Corrective Scale  Finger sticks per routine  Consistent Carb Diet  Hemoglobin A1c in AM  Hypoglycemia protocol

## 2022-04-14 NOTE — DISCHARGE NOTE PROVIDER - CARE PROVIDERS DIRECT ADDRESSES
,DirectAddress_Unknown,[email or web address] ,[email or web address],[email or web address] ,[email or web address],[email or web address],DirectAddress_Unknown,DirectAddress_Unknown

## 2022-04-14 NOTE — DISCHARGE NOTE PROVIDER - PROVIDER TOKENS
PROVIDER:[TOKEN:[[id number]],FOLLOWUP:[1 week]],PROVIDER:[TOKEN:[[id number]],FOLLOWUP:[1 week]] PROVIDER:[TOKEN:[[id number]],FOLLOWUP:[2 weeks],ESTABLISHEDPATIENT:[T]],PROVIDER:[TOKEN:[[id number]],FOLLOWUP:[1 week]] PROVIDER:[TOKEN:[[id number]],FOLLOWUP:[2 weeks],ESTABLISHEDPATIENT:[T]],PROVIDER:[TOKEN:[[id number]],FOLLOWUP:[1 week]],PROVIDER:[TOKEN:[[id number]],FOLLOWUP:[1 week]] PROVIDER:[TOKEN:[[id number]],FOLLOWUP:[2 weeks],ESTABLISHEDPATIENT:[T]],PROVIDER:[TOKEN:[[id number]],FOLLOWUP:[1 week]],PROVIDER:[TOKEN:[[id number]],FOLLOWUP:[1 week]],PROVIDER:[TOKEN:[[id number]],FOLLOWUP:[2 weeks]],PROVIDER:[TOKEN:[[id number]]]

## 2022-04-14 NOTE — PROGRESS NOTE ADULT - PROBLEM SELECTOR PLAN 1
Pt presents with x5 episodes of Gross hematuria since this AM ? Etiology   -Pt was on  Eliquis on d/c from [facility]  -Hemodynamically stable  -Hold home Eliquis/ASA  -Pt had mikayla blood from the penis o/n, was anuric. Bladder irrigated by surgery PA, CBI & mckinnon in place  -F/u urine cytopathology, culture   -Urology Dr. Kiser, following   -Cardiology consulted Dr. Valdovinos, f/u recs Pt presents with x5 episodes of Gross hematuria    - This AM pt had mikayla blood from the penis o/n, was anuric. Bladder irrigated by surgery PA, CBSCOTT & mckinnon in place  -Pt was on  Eliquis on d/c from [facility]  -Hold home Eliquis/ASA  -F/u urine cytopathology, culture   -Urology Dr. Kiser, following Pt presents with x 5 episodes of Gross hematuria at home  -This AM patient had urinary retention, 3 way Mckinnon placed this AM  - This AM pt had mikayla blood from the penis , No Urine out put, Bladder irrigated by surgery PA, CBSCOTT & mckinnon in place  -Pt was resumed on  Eliquis on d/c from [facility]  -Hold home Eliquis/ASA  -F/u urine cytopathology,  Urine culture /S  -Urology Dr. Kiser, following D/W  started on CBI

## 2022-04-14 NOTE — PROGRESS NOTE ADULT - PROBLEM SELECTOR PLAN 7
Chronic CHF Diastolic   -Continue home Lasix 40 mg daily , would hold if renal function worsens   -Last echo 1/2022 Moderate left ventricular hypertrophy with normal systolic function, estimated LVEF of 55%. Grossly normal RV size and systolic function. Calcified trileaflet aortic valve with mild aortic stenosis. Mild MR and TR. No significant pericardial effusion.  -Dash/ TLC Diet  -Cardio Dr Valdovinos consulted, f/u recs Chronic CHF Diastolic   -Continue home Lasix 40 mg daily , would hold if renal function worsens   -Last echo 1/2022 Moderate left ventricular hypertrophy with normal systolic function, estimated LVEF of 55%. Grossly normal RV size and systolic function. Calcified trileaflet aortic valve with mild aortic stenosis. Mild MR and TR. No significant pericardial effusion.  -Dash/ TLC Diet  Cardio DR. Bliss's group following Chronic Diastolic CHF   -Continue home Lasix 40 mg daily ,  -Last echo 1/2022 Moderate left ventricular hypertrophy with normal systolic function, estimated LVEF of 55%. Grossly normal RV size and systolic function. Calcified trileaflet aortic valve with mild aortic stenosis. Mild MR and TR. No significant pericardial effusion.  -Dash/ TLC Diet  Cardio DR. Bliss's group following

## 2022-04-14 NOTE — PROGRESS NOTE ADULT - PROBLEM SELECTOR PLAN 8
Chronic, stable  Continue home Clonidine, Hydralazine, Coreg, Amlodipine and  Lasix  with hold parameters  Hemodynamically stable   Dash Diet

## 2022-04-15 LAB
ALBUMIN SERPL ELPH-MCNC: 2.9 G/DL — LOW (ref 3.3–5)
ALP SERPL-CCNC: 60 U/L — SIGNIFICANT CHANGE UP (ref 40–120)
ALT FLD-CCNC: 16 U/L — SIGNIFICANT CHANGE UP (ref 12–78)
ANION GAP SERPL CALC-SCNC: 7 MMOL/L — SIGNIFICANT CHANGE UP (ref 5–17)
APTT BLD: 36.2 SEC — HIGH (ref 27.5–35.5)
AST SERPL-CCNC: 12 U/L — LOW (ref 15–37)
BASOPHILS # BLD AUTO: 0.02 K/UL — SIGNIFICANT CHANGE UP (ref 0–0.2)
BASOPHILS NFR BLD AUTO: 0.4 % — SIGNIFICANT CHANGE UP (ref 0–2)
BILIRUB SERPL-MCNC: 0.2 MG/DL — SIGNIFICANT CHANGE UP (ref 0.2–1.2)
BUN SERPL-MCNC: 22 MG/DL — SIGNIFICANT CHANGE UP (ref 7–23)
CALCIUM SERPL-MCNC: 9.1 MG/DL — SIGNIFICANT CHANGE UP (ref 8.5–10.1)
CHLORIDE SERPL-SCNC: 108 MMOL/L — SIGNIFICANT CHANGE UP (ref 96–108)
CO2 SERPL-SCNC: 26 MMOL/L — SIGNIFICANT CHANGE UP (ref 22–31)
CREAT SERPL-MCNC: 1.8 MG/DL — HIGH (ref 0.5–1.3)
CULTURE RESULTS: SIGNIFICANT CHANGE UP
EGFR: 39 ML/MIN/1.73M2 — LOW
EOSINOPHIL # BLD AUTO: 0.17 K/UL — SIGNIFICANT CHANGE UP (ref 0–0.5)
EOSINOPHIL NFR BLD AUTO: 3.4 % — SIGNIFICANT CHANGE UP (ref 0–6)
GLUCOSE SERPL-MCNC: 122 MG/DL — HIGH (ref 70–99)
HCT VFR BLD CALC: 24.4 % — LOW (ref 39–50)
HGB BLD-MCNC: 8.1 G/DL — LOW (ref 13–17)
IMM GRANULOCYTES NFR BLD AUTO: 0.4 % — SIGNIFICANT CHANGE UP (ref 0–1.5)
INR BLD: 1.13 RATIO — SIGNIFICANT CHANGE UP (ref 0.88–1.16)
LYMPHOCYTES # BLD AUTO: 0.85 K/UL — LOW (ref 1–3.3)
LYMPHOCYTES # BLD AUTO: 16.8 % — SIGNIFICANT CHANGE UP (ref 13–44)
MAGNESIUM SERPL-MCNC: 2.3 MG/DL — SIGNIFICANT CHANGE UP (ref 1.6–2.6)
MCHC RBC-ENTMCNC: 29.3 PG — SIGNIFICANT CHANGE UP (ref 27–34)
MCHC RBC-ENTMCNC: 33.2 GM/DL — SIGNIFICANT CHANGE UP (ref 32–36)
MCV RBC AUTO: 88.4 FL — SIGNIFICANT CHANGE UP (ref 80–100)
MONOCYTES # BLD AUTO: 0.4 K/UL — SIGNIFICANT CHANGE UP (ref 0–0.9)
MONOCYTES NFR BLD AUTO: 7.9 % — SIGNIFICANT CHANGE UP (ref 2–14)
NEUTROPHILS # BLD AUTO: 3.61 K/UL — SIGNIFICANT CHANGE UP (ref 1.8–7.4)
NEUTROPHILS NFR BLD AUTO: 71.1 % — SIGNIFICANT CHANGE UP (ref 43–77)
NON-GYNECOLOGICAL CYTOLOGY STUDY: SIGNIFICANT CHANGE UP
NRBC # BLD: 0 /100 WBCS — SIGNIFICANT CHANGE UP (ref 0–0)
PHOSPHATE SERPL-MCNC: 3.5 MG/DL — SIGNIFICANT CHANGE UP (ref 2.5–4.5)
PLATELET # BLD AUTO: 211 K/UL — SIGNIFICANT CHANGE UP (ref 150–400)
POTASSIUM SERPL-MCNC: 3.3 MMOL/L — LOW (ref 3.5–5.3)
POTASSIUM SERPL-SCNC: 3.3 MMOL/L — LOW (ref 3.5–5.3)
PROT SERPL-MCNC: 5.6 G/DL — LOW (ref 6–8.3)
PROTHROM AB SERPL-ACNC: 13.2 SEC — SIGNIFICANT CHANGE UP (ref 10.5–13.4)
RBC # BLD: 2.76 M/UL — LOW (ref 4.2–5.8)
RBC # FLD: 15.9 % — HIGH (ref 10.3–14.5)
SODIUM SERPL-SCNC: 141 MMOL/L — SIGNIFICANT CHANGE UP (ref 135–145)
SPECIMEN SOURCE: SIGNIFICANT CHANGE UP
WBC # BLD: 5.07 K/UL — SIGNIFICANT CHANGE UP (ref 3.8–10.5)
WBC # FLD AUTO: 5.07 K/UL — SIGNIFICANT CHANGE UP (ref 3.8–10.5)

## 2022-04-15 PROCEDURE — 99232 SBSQ HOSP IP/OBS MODERATE 35: CPT

## 2022-04-15 RX ORDER — POTASSIUM CHLORIDE 20 MEQ
40 PACKET (EA) ORAL EVERY 4 HOURS
Refills: 0 | Status: COMPLETED | OUTPATIENT
Start: 2022-04-15 | End: 2022-04-15

## 2022-04-15 RX ADMIN — Medication 40 MILLIEQUIVALENT(S): at 12:01

## 2022-04-15 RX ADMIN — Medication 40 MILLIEQUIVALENT(S): at 17:41

## 2022-04-15 RX ADMIN — ISOSORBIDE MONONITRATE 30 MILLIGRAM(S): 60 TABLET, EXTENDED RELEASE ORAL at 12:01

## 2022-04-15 RX ADMIN — Medication 1 TABLET(S): at 17:41

## 2022-04-15 RX ADMIN — Medication 1: at 12:00

## 2022-04-15 RX ADMIN — PANTOPRAZOLE SODIUM 40 MILLIGRAM(S): 20 TABLET, DELAYED RELEASE ORAL at 07:02

## 2022-04-15 RX ADMIN — Medication 650 MILLIGRAM(S): at 14:23

## 2022-04-15 RX ADMIN — PREGABALIN 1000 MICROGRAM(S): 225 CAPSULE ORAL at 12:01

## 2022-04-15 RX ADMIN — Medication 20 MILLIEQUIVALENT(S): at 12:00

## 2022-04-15 RX ADMIN — Medication 0.2 MILLIGRAM(S): at 17:42

## 2022-04-15 RX ADMIN — Medication 1 MILLIGRAM(S): at 12:01

## 2022-04-15 RX ADMIN — CARVEDILOL PHOSPHATE 3.12 MILLIGRAM(S): 80 CAPSULE, EXTENDED RELEASE ORAL at 17:42

## 2022-04-15 RX ADMIN — AMLODIPINE BESYLATE 10 MILLIGRAM(S): 2.5 TABLET ORAL at 05:30

## 2022-04-15 RX ADMIN — LAMOTRIGINE 100 MILLIGRAM(S): 25 TABLET, ORALLY DISINTEGRATING ORAL at 12:01

## 2022-04-15 RX ADMIN — Medication 100 MILLIGRAM(S): at 05:28

## 2022-04-15 RX ADMIN — Medication 4 MILLIGRAM(S): at 05:29

## 2022-04-15 RX ADMIN — CARVEDILOL PHOSPHATE 3.12 MILLIGRAM(S): 80 CAPSULE, EXTENDED RELEASE ORAL at 05:29

## 2022-04-15 RX ADMIN — ATORVASTATIN CALCIUM 10 MILLIGRAM(S): 80 TABLET, FILM COATED ORAL at 22:41

## 2022-04-15 RX ADMIN — Medication 100 MILLIGRAM(S): at 13:23

## 2022-04-15 RX ADMIN — FAMOTIDINE 40 MILLIGRAM(S): 10 INJECTION INTRAVENOUS at 05:29

## 2022-04-15 RX ADMIN — Medication 4 MILLIGRAM(S): at 17:42

## 2022-04-15 RX ADMIN — Medication 1 TABLET(S): at 05:29

## 2022-04-15 RX ADMIN — Medication 650 MILLIGRAM(S): at 13:23

## 2022-04-15 RX ADMIN — Medication 40 MILLIGRAM(S): at 05:27

## 2022-04-15 RX ADMIN — Medication 150 MILLIGRAM(S): at 12:01

## 2022-04-15 RX ADMIN — Medication 0.2 MILLIGRAM(S): at 05:29

## 2022-04-15 RX ADMIN — FAMOTIDINE 40 MILLIGRAM(S): 10 INJECTION INTRAVENOUS at 17:42

## 2022-04-15 RX ADMIN — Medication 100 MILLIGRAM(S): at 22:31

## 2022-04-15 NOTE — PROGRESS NOTE ADULT - PROBLEM SELECTOR PLAN 3
CKD 3-Cr 2.3 on admission, baseline per chart review ~1.9-3,  - Creatinine downtrending 1.8  - Follow BMP  -Renal Dose Meds WDL

## 2022-04-15 NOTE — PROVIDER CONTACT NOTE (OTHER) - SITUATION
Pt with continues CBI pt ambulated to bathroom and aide observed blood tinge urine on the floor. RN observed same. Placed in bed. RN observed Pt with continues CBI pt ambulated to bathroom and aide observed blood tinge urine on the floor. RN observed same. Placed in bed. RN observed same. Pt c/o pressure to filemon area. Have the urge to void.

## 2022-04-15 NOTE — PROVIDER CONTACT NOTE (OTHER) - SITUATION
Pt CBI leaking again- had spoken to Luis HIDALGO about it earlier in shift. Irrigated and no clots were evacuated. Catheter patent and flowing with CBI.

## 2022-04-15 NOTE — PROGRESS NOTE ADULT - PROBLEM SELECTOR PLAN 7
Chronic Diastolic CHF   -Continue home Lasix 40 mg daily ,  -Last echo 1/2022 Moderate left ventricular hypertrophy with normal systolic function, estimated LVEF of 55%. Grossly normal RV size and systolic function. Calcified trileaflet aortic valve with mild aortic stenosis. Mild MR and TR. No significant pericardial effusion.  -Dash/ TLC Diet  Cardio DR. Bliss's group following Chronic, history of stents  - Continue Coreg,  Atorvastatin and Imdur   - Hold Eliquis and ASA in setting of hematuria  - Cardio DR. Bliss's group following

## 2022-04-15 NOTE — PROGRESS NOTE ADULT - PROBLEM SELECTOR PLAN 1
Pt presents with x 5 episodes of Gross hematuria at home  - On continue CBI, plan to stop tomorrow per uro    -Hold home Eliquis/ASA  -F/u urine cytopathology  -Urology Dr. Kiser

## 2022-04-15 NOTE — PROGRESS NOTE ADULT - PROBLEM SELECTOR PLAN 10
Diabetes type II   Hold oral hypoglycemic meds  Insulin Corrective Scale  Finger sticks per routine  Consistent Carb Diet  Hypoglycemia protocol Chronic  - Continue home atorvastatin

## 2022-04-15 NOTE — PROGRESS NOTE ADULT - PROBLEM SELECTOR PLAN 8
Chronic, normotensive this AM   Continue home Clonidine, Hydralazine, Coreg, Amlodipine and  Lasix  with hold parameters  Hemodynamically stable   Dash Diet Diabetes type II   Hold oral hypoglycemic meds  Insulin Corrective Scale  Finger sticks per routine  Consistent Carb Diet  Hypoglycemia protocol

## 2022-04-15 NOTE — PROGRESS NOTE ADULT - PROBLEM SELECTOR PLAN 4
Chronic, HR stable   Continue Coreg  w/ hold parameters   Holding Eliquis in setting of hematuria   EKG: sinus bradycardia with 1st degree AV block, LVH with QRS widening and repolarization abnormality  Cardio DR. Bliss's group following Chronic, normotensive this AM   Continue home Clonidine, Hydralazine, Coreg, Amlodipine and  Lasix  with hold parameters  Hemodynamically stable   Dash Diet

## 2022-04-15 NOTE — PROVIDER CONTACT NOTE (OTHER) - ASSESSMENT
El irrigated. Small clot expelled. Balloon check 20ml returned. RN inflated balloon with 30ml NS El continue with leakage. Additional 5 cc inserted. Leakage stopped. Dr Smith notified of El deflation and inflation.  Pt reported no more pressure when balloon.

## 2022-04-15 NOTE — PROGRESS NOTE ADULT - PROBLEM SELECTOR PLAN 6
Chronic, history of stents  - Continue Coreg,  Atorvastatin and Imdur   - Hold Eliquis and ASA in setting of hematuria  - Cardio DR. Bliss's group following Chronic, HR stable   Continue Coreg  w/ hold parameters   Holding Eliquis in setting of hematuria   EKG: sinus bradycardia with 1st degree AV block, LVH with QRS widening and repolarization abnormality  Cardio DR. Bliss's group following

## 2022-04-15 NOTE — PROGRESS NOTE ADULT - PROBLEM SELECTOR PLAN 2
-Acute on Chronic Anemia , Recent Upper GI Bleed , Now with Hematuria  - Hb downtrended this AM 8.1 from 8.4  - recent GI Bleed -small bowel- Jejunum,   pt had Colonoscopy -Diverticulosis & Capsule Endoscopy at [facility] last week -NO active GI bleed.   - Transfused for Hb less than 8  - daily CBC  - Holding home Eliquis & ASA

## 2022-04-15 NOTE — PROGRESS NOTE ADULT - PROBLEM SELECTOR PLAN 5
Pt recently admitted to [facility] for GIB,  got 5 u pRBC in total at [facility]   -Pt was transferred to [facility] for Capsule endoscopy  and d/c'd 4/11  -Denies GIB since hospitalization  -Started Pantoprazole qd  -Continue home Pepcid  -Continue to monitor for signs/symptoms of GIB Chronic Diastolic CHF   -Continue home Lasix 40 mg daily ,  -Last echo 1/2022 Moderate left ventricular hypertrophy with normal systolic function, estimated LVEF of 55%. Grossly normal RV size and systolic function. Calcified trileaflet aortic valve with mild aortic stenosis. Mild MR and TR. No significant pericardial effusion.  -Dash/ TLC Diet  Cardio DR. Bliss's group following

## 2022-04-15 NOTE — PROGRESS NOTE ADULT - SUBJECTIVE AND OBJECTIVE BOX
Patient is a 77y old  Male who presents with a chief complaint of Hematuria (13 Apr 2022 16:16)    HPI:  76yo M with PMH of Afib (on eliquis), MGUS, depression, CAD s/p stents, HLD, HTN, CKD, DM2, diverticulosis, Anemia , Diastolic  CHF (Echo 1/22 LVEF 55%) and BPH with recent GI Bleed 4/22 with multiple pRBC transfusions recently d/chio from [facility] 4/11/22 presents to the ED with hematuria. Patient states he went to void at 3AM and noticed it was difficult for him to urinate at first, but when he was able to void, he saw a lot of mikayla blood. Patient had 5 episodes of mikayla blood without clots along with dysuria. Denies trauma, falls, penile lesion. Per chart review, patient was admitted to [facility] on 3/30/22 for a rectal bleed and was transfused 4-5 units of pRBC and transferred to [facility] for further workup after a bleeding scan showed a jejunal bleed. During this time, Eliquis was held. Capsule endoscopy revealed possible small bleeding site. Pt's Eliquis was resumed on 4/8 with no reoccurrence of a GIB. Pt last took Eliquis this AM.     Admits to HA, dizziness, nausea, decreased PO intake. Denies fever/chills, chest pain, palpitations, vomiting, constipation, diarrhea, hematochezia.     ED course:   Vitals:  BP:106/71      HR: 61     Temp: 97.2      RR:18     O2: 96%  Labs significant for: H/H 9.4/28.1, PT 21.7, INR 1.84, PTT 45.8, BUN/Cr 31/2.3, glucose 116  EKG: sinus bradycardia with 1st degree AV block, LVH with QRS widening and repolarization abnormality (13 Apr 2022 11:54)    INTERVAL HPI:  4/14/22: Pt seen and examined at bedside. Complaining of 9/10 lower abdominal pain, nausea, as well as lots of mikayla blood loss from the penis. Bladder scan revealed >400cc of urine, pt attempted to use the bathroom but was actively bleeding. Urology Dr. Kiser spoke to the surgical PA, irrigated bladder and removed a copious amount of clots. CBI was ordered. Pt also complaining of b/l knee pain & headache. Denies fever/chills, chest pain, palpitations, vomiting/ constipation/ diarrhea/ hematochezia. Hgb dropped from 9.4 to 8.4, but otherwise hemodynamically stable.   04/15/22 Patient seen and examined at bedside. Patient states mild discomfort in pelvic area.  Hb dropped from 8.4 to 8.1 this AM     OVERNIGHT EVENTS:  Per the pt, he was anuric overnight, only passed a lot of mikayla blood in the toilet.     Home Medications:  aspirin 81 mg oral tablet: 1 tab(s) orally once a day (13 Apr 2022 15:41)  budesonide-formoterol 80 mcg-4.5 mcg/inh inhalation aerosol: 2 puff(s) inhaled 2 times a day (13 Apr 2022 15:41)  cloNIDine 0.2 mg oral tablet: 1 tab(s) orally 2 times a day (13 Apr 2022 15:41)  diclofenac 1% topical gel: Apply topically to affected area 3 times a day (13 Apr 2022 15:41)  doxazosin 4 mg oral tablet: 1 tab(s) orally 2 times a day (13 Apr 2022 15:41)  Eliquis 5 mg oral tablet: 1 tab(s) orally 2 times a day (13 Apr 2022 15:41)  famotidine 40 mg oral tablet: 1 tab(s) orally 2 times a day (13 Apr 2022 15:41)  isosorbide mononitrate 30 mg oral tablet, extended release: 1 tab(s) orally once a day (in the morning) (13 Apr 2022 15:41)  lactobacillus acidophilus oral capsule: 1 cap(s) orally 2 times a day (13 Apr 2022 15:41)  lamoTRIgine 100 mg oral tablet: 1 tab(s) orally once a day (13 Apr 2022 15:41)  metFORMIN 500 mg oral tablet: 1 tab(s) orally 2 times a day (13 Apr 2022 15:41)  oxybutynin 15 mg/24 hr oral tablet, extended release: 1 tab(s) orally once a day (13 Apr 2022 15:41)  potassium chloride 20 mEq oral tablet, extended release: 1 tab(s) orally once a day (13 Apr 2022 15:41)  Vitamin B-12 1000 mcg oral tablet: 1 tab(s) orally once a day (13 Apr 2022 15:41)      MEDICATIONS  (STANDING):  MEDICATIONS  (STANDING):  amLODIPine   Tablet 10 milliGRAM(s) Oral daily  atorvastatin 10 milliGRAM(s) Oral at bedtime  budesonide  80 MICROgram(s)/formoterol 4.5 MICROgram(s) Inhaler 2 Puff(s) Inhalation two times a day  carvedilol 3.125 milliGRAM(s) Oral every 12 hours  cloNIDine 0.2 milliGRAM(s) Oral two times a day  cyanocobalamin 1000 MICROGram(s) Oral daily  dextrose 5%. 1000 milliLiter(s) (50 mL/Hr) IV Continuous <Continuous>  dextrose 5%. 1000 milliLiter(s) (100 mL/Hr) IV Continuous <Continuous>  dextrose 50% Injectable 25 Gram(s) IV Push once  dextrose 50% Injectable 12.5 Gram(s) IV Push once  dextrose 50% Injectable 25 Gram(s) IV Push once  doxazosin Oral Tab/Cap - Peds 4 milliGRAM(s) Oral two times a day  famotidine    Tablet 40 milliGRAM(s) Oral two times a day  folic acid 1 milliGRAM(s) Oral daily  furosemide    Tablet 40 milliGRAM(s) Oral daily  glucagon  Injectable 1 milliGRAM(s) IntraMuscular once  hydrALAZINE 100 milliGRAM(s) Oral three times a day  insulin lispro (ADMELOG) corrective regimen sliding scale   SubCutaneous three times a day before meals  insulin lispro (ADMELOG) corrective regimen sliding scale   SubCutaneous at bedtime  isosorbide   mononitrate ER Tablet (IMDUR) 30 milliGRAM(s) Oral daily  lactobacillus acidophilus 1 Tablet(s) Oral two times a day  lamoTRIgine 100 milliGRAM(s) Oral daily  pantoprazole    Tablet 40 milliGRAM(s) Oral before breakfast  potassium chloride    Tablet ER 20 milliEquivalent(s) Oral daily  potassium chloride   Powder 40 milliEquivalent(s) Oral every 4 hours  venlafaxine XR. 150 milliGRAM(s) Oral daily      Allergies    No Known Allergies    Intolerances        Social History:  Tobacco: quit 45 years ago, former 10 pack years smoker   EtOH: denies  recreational drug use: denies  Lives with: alone  Ambulates:  independent  ADLs: independent  Occupation: retired  Vaccinations: Moderna x2 (13 Apr 2022 11:54)      REVIEW OF SYSTEMS:  CONSTITUTIONAL: No fever, No chills, No fatigue, No myalgia, No Body ache, No Weakness  EYES: No eye pain,  No visual disturbances, No discharge, NO Redness  ENMT:  No ear pain, No nose bleed, No vertigo; No sinus pain, NO throat pain, No Congestion  NECK: No pain, No stiffness  RESPIRATORY: No cough, NO wheezing, No  hemoptysis, NO  shortness of breath  CARDIOVASCULAR: No chest pain, palpitations  GASTROINTESTINAL: Admits to abdominal pain, NO epigastric pain. No nausea, No vomiting; No diarrhea, No constipation. [x ] BM  GENITOURINARY: Admits to dysuria, anuria/oliguria, mikayla blood output, No frequency, No urgency, NO incontinence  NEUROLOGICAL: Admits to headaches, No dizziness, No numbness, No tingling, No tremors, No weakness  EXT: No Swelling, No Pain, No Edema  SKIN:  [  x] No itching, burning, rashes, or lesions   MUSCULOSKELETAL: Admits to b/l knee pain,No Jt swelling; No muscle pain, No back pain  PSYCHIATRIC: No depression,  No anxiety,  No mood swings ,No difficulty sleeping at night  PAIN SCALE: [  ] None  [x  ] Other-9/10 abdominal pain   ROS Unable to obtain due to - [  ] Dementia  [  ] Lethargy [  ] Drowsy [  ] Sedated [  ] non verbal  REST OF REVIEW Of SYSTEM - [ x ] Normal         04-13 @ 07:01  -  04-14 @ 07:00  --------------------------------------------------------  IN: 0 mL / OUT: 0 mL / NET: 0 mL        PHYSICAL EXAM:  GENERAL:  [ x ] NAD--appears slightly anxious , [  ] well appearing, [  ] Agitated, [  ] Drowsy,  [  ] Lethargy, [  ] confused   HEAD:  [ x ] Normal, [  ] Other  EYES:  [  ] EOMI, [  ] PERRLA, [x  ] conjunctiva and sclera clear normal, [  ] Other,  [  ] Pallor,[  ] Discharge  ENMT:  [x  ] Normal, [x  ] Moist mucous membranes, [  ] Good dentition, [  ] No Thrush  NECK:  [x  ] Supple, [ x ] No JVD, [  x] Normal thyroid, [  ] Lymphadenopathy [  ] Other  CHEST/LUNG:  [ x ] Clear to auscultation bilaterally, [ x ] Breath Sounds equal B/L, [  ] poor effort  [ x ] No rales, [x  ] No rhonchi  [x  ]  No wheezing,   HEART:  [ x ] Regular rate and rhythm, [  ] tachycardia, [  ] Bradycardia,  [  ] irregular  [ x ] No murmurs, No rubs, No gallops, [ x ] PPM in place (Mfr:  )  ABDOMEN:  Tender to palpation, bladder distended  [ x ] Soft, [ ] Nontender, [  ] Nondistended, [ x ] No mass, [ x ] Bowel sounds present, [  ] obese  NERVOUS SYSTEM:  [  x] Alert & Oriented X3, [  x] Nonfocal  [  ] Confusion  [  ] Encephalopathic [  ] Sedated [  ] Unable to assess, [  ] Dementia [  ] Other-  EXTREMITIES: [ x ] 2+ Peripheral Pulses, No clubbing, No cyanosis,  [  ] edema B/L lower EXT. [  ] PVD stasis skin changes B/L Lower EXT, [  ] wound  LYMPH: No lymphadenopathy noted  SKIN:  [ x ] No rashes or lesions, [  ] Pressure Ulcers, [  ] ecchymosis, [  ] Skin Tears, [  ] Other    DIET: Diet, DASH/TLC:   Sodium & Cholesterol Restricted (04-13-22 @ 12:27)      LABS:                        8.4    5.99  )-----------( 208      ( 14 Apr 2022 08:28 )             24.7     14 Apr 2022 08:28    140    |  110    |  29     ----------------------------<  112    3.5     |  20     |  2.00     Ca    8.6        14 Apr 2022 08:28      PT/INR - ( 13 Apr 2022 08:46 )   PT: 21.7 sec;   INR: 1.84 ratio         PTT - ( 13 Apr 2022 08:46 )  PTT:45.8 sec                         Anemia Panel:      Thyroid Panel:                RADIOLOGY & ADDITIONAL TESTS:      HEALTH ISSUES - PROBLEM Dx:  Hematuria    Afib    GIB (gastrointestinal bleeding)    Anemia    CAD (coronary artery disease)    Acute kidney injury superimposed on CKD    Chronic CHF    HTN (hypertension)    HLD (hyperlipidemia)    DM (diabetes mellitus)    BPH (benign prostatic hyperplasia)    Anxiety and depression    DVT prophylaxis            Consultant(s) Notes Reviewed:  [ x ] YES     Care Discussed with [X] Consultants  [  ] Patient  [  ] Family [  ] HCP [  ]   [  ] Social Service  [  ] RN, [  ] Physical Therapy,[  ] Palliative care team  DVT PPX: [  ] Lovenox, [  ] S C Heparin, [  ] Coumadin, [  ] Xarelto, [  ] Eliquis, [  ] Pradaxa, [  ] IV Heparin drip, [ x ] SCD [  ] Contraindication 2 to GI Bleed,[  ] Ambulation [ x ] Contraindicated 2 to  bleed [  ] Contraindicated 2 to Brain Bleed  Advanced directive: [ x ] None, [  ] DNR/DNI Patient is a 77y old  Male who presents with a chief complaint of Hematuria (13 Apr 2022 16:16)    HPI:  76yo M with PMH of Afib (on eliquis), MGUS, depression, CAD s/p stents, HLD, HTN, CKD, DM2, diverticulosis, Anemia , Diastolic  CHF (Echo 1/22 LVEF 55%) and BPH with recent GI Bleed 4/22 with multiple pRBC transfusions recently d/chio from [facility] 4/11/22 presents to the ED with hematuria. Patient states he went to void at 3AM and noticed it was difficult for him to urinate at first, but when he was able to void, he saw a lot of mikayla blood. Patient had 5 episodes of mikayla blood without clots along with dysuria. Denies trauma, falls, penile lesion. Per chart review, patient was admitted to [facility] on 3/30/22 for a rectal bleed and was transfused 4-5 units of pRBC and transferred to [facility] for further workup after a bleeding scan showed a jejunal bleed. During this time, Eliquis was held. Capsule endoscopy revealed possible small bleeding site. Pt's Eliquis was resumed on 4/8 with no reoccurrence of a GIB. Pt last took Eliquis this AM.     Admits to HA, dizziness, nausea, decreased PO intake. Denies fever/chills, chest pain, palpitations, vomiting, constipation, diarrhea, hematochezia.     ED course:   Vitals:  BP:106/71      HR: 61     Temp: 97.2      RR:18     O2: 96%  Labs significant for: H/H 9.4/28.1, PT 21.7, INR 1.84, PTT 45.8, BUN/Cr 31/2.3, glucose 116  EKG: sinus bradycardia with 1st degree AV block, LVH with QRS widening and repolarization abnormality (13 Apr 2022 11:54)    INTERVAL HPI:  4/14/22: Pt seen and examined at bedside. Complaining of 9/10 lower abdominal pain, nausea, as well as lots of mikayla blood loss from the penis. Bladder scan revealed >400cc of urine, pt attempted to use the bathroom but was actively bleeding. Urology Dr. Kiser spoke to the surgical PA, irrigated bladder and removed a copious amount of clots. CBI was ordered. Pt also complaining of b/l knee pain & headache. Denies fever/chills, chest pain, palpitations, vomiting/ constipation/ diarrhea/ hematochezia. Hgb dropped from 9.4 to 8.4, but otherwise hemodynamically stable.   04/15/22 Patient seen and examined at bedside. Patient states mild discomfort in pelvic area.  Hb dropped from 8.4 to 8.1 this AM , Low stable H/H     OVERNIGHT EVENTS:  Per the pt, he was anuric overnight, only passed a lot of mikayla blood in the toilet.     Home Medications:  aspirin 81 mg oral tablet: 1 tab(s) orally once a day (13 Apr 2022 15:41)  budesonide-formoterol 80 mcg-4.5 mcg/inh inhalation aerosol: 2 puff(s) inhaled 2 times a day (13 Apr 2022 15:41)  cloNIDine 0.2 mg oral tablet: 1 tab(s) orally 2 times a day (13 Apr 2022 15:41)  diclofenac 1% topical gel: Apply topically to affected area 3 times a day (13 Apr 2022 15:41)  doxazosin 4 mg oral tablet: 1 tab(s) orally 2 times a day (13 Apr 2022 15:41)  Eliquis 5 mg oral tablet: 1 tab(s) orally 2 times a day (13 Apr 2022 15:41)  famotidine 40 mg oral tablet: 1 tab(s) orally 2 times a day (13 Apr 2022 15:41)  isosorbide mononitrate 30 mg oral tablet, extended release: 1 tab(s) orally once a day (in the morning) (13 Apr 2022 15:41)  lactobacillus acidophilus oral capsule: 1 cap(s) orally 2 times a day (13 Apr 2022 15:41)  lamoTRIgine 100 mg oral tablet: 1 tab(s) orally once a day (13 Apr 2022 15:41)  metFORMIN 500 mg oral tablet: 1 tab(s) orally 2 times a day (13 Apr 2022 15:41)  oxybutynin 15 mg/24 hr oral tablet, extended release: 1 tab(s) orally once a day (13 Apr 2022 15:41)  potassium chloride 20 mEq oral tablet, extended release: 1 tab(s) orally once a day (13 Apr 2022 15:41)  Vitamin B-12 1000 mcg oral tablet: 1 tab(s) orally once a day (13 Apr 2022 15:41)      MEDICATIONS  (STANDING):  amLODIPine   Tablet 10 milliGRAM(s) Oral daily  atorvastatin 10 milliGRAM(s) Oral at bedtime  budesonide  80 MICROgram(s)/formoterol 4.5 MICROgram(s) Inhaler 2 Puff(s) Inhalation two times a day  carvedilol 3.125 milliGRAM(s) Oral every 12 hours  cloNIDine 0.2 milliGRAM(s) Oral two times a day  cyanocobalamin 1000 MICROGram(s) Oral daily  dextrose 5%. 1000 milliLiter(s) (50 mL/Hr) IV Continuous <Continuous>  dextrose 5%. 1000 milliLiter(s) (100 mL/Hr) IV Continuous <Continuous>  dextrose 50% Injectable 25 Gram(s) IV Push once  dextrose 50% Injectable 12.5 Gram(s) IV Push once  dextrose 50% Injectable 25 Gram(s) IV Push once  doxazosin Oral Tab/Cap - Peds 4 milliGRAM(s) Oral two times a day  famotidine    Tablet 40 milliGRAM(s) Oral two times a day  folic acid 1 milliGRAM(s) Oral daily  furosemide    Tablet 40 milliGRAM(s) Oral daily  glucagon  Injectable 1 milliGRAM(s) IntraMuscular once  hydrALAZINE 100 milliGRAM(s) Oral three times a day  insulin lispro (ADMELOG) corrective regimen sliding scale   SubCutaneous three times a day before meals  insulin lispro (ADMELOG) corrective regimen sliding scale   SubCutaneous at bedtime  isosorbide   mononitrate ER Tablet (IMDUR) 30 milliGRAM(s) Oral daily  lactobacillus acidophilus 1 Tablet(s) Oral two times a day  lamoTRIgine 100 milliGRAM(s) Oral daily  pantoprazole    Tablet 40 milliGRAM(s) Oral before breakfast  potassium chloride    Tablet ER 20 milliEquivalent(s) Oral daily  potassium chloride   Powder 40 milliEquivalent(s) Oral every 4 hours  venlafaxine XR. 150 milliGRAM(s) Oral daily      Allergies    No Known Allergies    Intolerances        Social History:  Tobacco: quit 45 years ago, former 10 pack years smoker   EtOH: denies  recreational drug use: denies  Lives with: alone  Ambulates:  independent  ADLs: independent  Occupation: retired  Vaccinations: Moderna x2 (13 Apr 2022 11:54)      REVIEW OF SYSTEMS:  CONSTITUTIONAL: No fever, No chills, No fatigue, No myalgia, No Body ache, No Weakness  EYES: No eye pain,  No visual disturbances, No discharge, NO Redness  ENMT:  No ear pain, No nose bleed, No vertigo; No sinus pain, NO throat pain, No Congestion  NECK: No pain, No stiffness  RESPIRATORY: No cough, NO wheezing, No  hemoptysis, NO  shortness of breath  CARDIOVASCULAR: No chest pain, palpitations  GASTROINTESTINAL: Admits to abdominal pain, NO epigastric pain. No nausea, No vomiting; No diarrhea, No constipation. [x ] BM  GENITOURINARY: Admits to dysuria, anuria/oliguria, mikayla blood output, No frequency, No urgency, NO incontinence  NEUROLOGICAL: Admits to headaches, No dizziness, No numbness, No tingling, No tremors, No weakness  EXT: No Swelling, No Pain, No Edema  SKIN:  [  x] No itching, burning, rashes, or lesions   MUSCULOSKELETAL: Admits to b/l knee pain,No Jt swelling; No muscle pain, No back pain  PSYCHIATRIC: No depression,  No anxiety,  No mood swings ,No difficulty sleeping at night  PAIN SCALE: [  ] None  [x  ] Other-mild discomfort  ROS Unable to obtain due to - [  ] Dementia  [  ] Lethargy [  ] Drowsy [  ] Sedated [  ] non verbal  REST OF REVIEW Of SYSTEM - [ x ] Normal         04-13 @ 07:01  -  04-14 @ 07:00  --------------------------------------------------------  IN: 0 mL / OUT: 0 mL / NET: 0 mL        PHYSICAL EXAM:  GENERAL:  [ x ] NAD--appears slightly anxious , [  ] well appearing, [  ] Agitated, [  ] Drowsy,  [  ] Lethargy, [  ] confused   HEAD:  [ x ] Normal, [  ] Other  EYES:  [  ] EOMI, [  ] PERRLA, [x  ] conjunctiva and sclera clear normal, [  ] Other,  [x  ] Pallor,[  ] Discharge  ENMT:  [x  ] Normal, [x  ] Moist mucous membranes, [ x ] Good dentition, [x  ] No Thrush  NECK:  [x  ] Supple, [ x ] No JVD, [  x] Normal thyroid, [  ] Lymphadenopathy [  ] Other  CHEST/LUNG:  [ x ] Clear to auscultation bilaterally, [ x ] Breath Sounds equal B/L, [ x ] poor effort  [ x ] No rales, [x  ] No rhonchi  [x  ]  No wheezing,   HEART:  [ x ] Regular rate and rhythm, [  ] tachycardia, [  ] Bradycardia,  [  ] irregular  [ x ] No murmurs, No rubs, No gallops, [ x ] PPM in place (Mfr:  )  ABDOMEN:  Tender to palpation, bladder distended  [ x ] Soft, [ ] Nontender, [  ] Nondistended, [ x ] No mass, [ x ] Bowel sounds present, [  ] obese  NERVOUS SYSTEM:  [  x] Alert & Oriented X3, [  x] Nonfocal  [  ] Confusion  [  ] Encephalopathic [  ] Sedated [  ] Unable to assess, [  ] Dementia [  ] Other-  EXTREMITIES: [ x ] 2+ Peripheral Pulses, No clubbing, No cyanosis,  [  ] edema B/L lower EXT. [  ] PVD stasis skin changes B/L Lower EXT, [  ] wound  LYMPH: No lymphadenopathy noted  SKIN:  [ x ] No rashes or lesions, [  ] Pressure Ulcers, [  ] ecchymosis, [  ] Skin Tears, [  ] Other    DIET: Diet, DASH/TLC:   Sodium & Cholesterol Restricted (04-13-22 @ 12:27)      LABS:                          8.1    5.07  )-----------( 211      ( 15 Apr 2022 08:39 )             24.4     15 Apr 2022 08:39    141    |  108    |  22     ----------------------------<  122    3.3     |  26     |  1.80     Ca    9.1        15 Apr 2022 08:39  Phos  3.5       15 Apr 2022 08:39  Mg     2.3       15 Apr 2022 08:39    TPro  5.6    /  Alb  2.9    /  TBili  0.2    /  DBili  x      /  AST  12     /  ALT  16     /  AlkPhos  60     15 Apr 2022 08:39      Culture - Urine (collected 14 Apr 2022 00:48)  Source: Clean Catch Clean Catch (Midstream)  Final Report (15 Apr 2022 10:43):    Culture grew 3 or more types of organisms which indicate    collection contamination; consider recollection only if clinically    indicated.                             8.4    5.99  )-----------( 208      ( 14 Apr 2022 08:28 )             24.7     14 Apr 2022 08:28    140    |  110    |  29     ----------------------------<  112    3.5     |  20     |  2.00     Ca    8.6        14 Apr 2022 08:28      PT/INR - ( 13 Apr 2022 08:46 )   PT: 21.7 sec;   INR: 1.84 ratio         PTT - ( 13 Apr 2022 08:46 )  PTT:45.8 sec              RADIOLOGY & ADDITIONAL TESTS:      HEALTH ISSUES - PROBLEM Dx:  Hematuria    Afib    GIB (gastrointestinal bleeding)    Anemia    CAD (coronary artery disease)    Acute kidney injury superimposed on CKD    Chronic CHF    HTN (hypertension)    HLD (hyperlipidemia)    DM (diabetes mellitus)    BPH (benign prostatic hyperplasia)    Anxiety and depression    DVT prophylaxis      Consultant(s) Notes Reviewed:  [ x ] YES     Care Discussed with [X] Consultants  [  ] Patient  [ x ] Family [  ] HCP [  ]   [  ] Social Service  [x  ] RN, [  ] Physical Therapy,[  ] Palliative care team  DVT PPX: [  ] Lovenox, [  ] S C Heparin, [  ] Coumadin, [  ] Xarelto, [  ] Eliquis, [  ] Pradaxa, [  ] IV Heparin drip, [ x ] SCD [  ] Contraindication 2 to GI Bleed,[  ] Ambulation [ x ] Contraindicated 2 to  bleed [  ] Contraindicated 2 to Brain Bleed  Advanced directive: [ x ] None, [  ] DNR/DNI

## 2022-04-15 NOTE — PROVIDER CONTACT NOTE (OTHER) - ACTION/TREATMENT ORDERED:
Surgical PA Luis came to bedside. Stated pratibha appears OK for now. Will either come back with another PA or defer to night time PA if it occurs again.
Surgical PA Vera made aware. PA to relay to night PA and ask them to come to bedside to assess. Will continue to monitor and relay info to night RN.
No new orders.

## 2022-04-15 NOTE — PROGRESS NOTE ADULT - SUBJECTIVE AND OBJECTIVE BOX
INTERVAL Hx:  No complaints.  Afebrile.  CBI showing light pink effluent.  Urine cytology    MEDICATIONS  (STANDING):  amLODIPine   Tablet 10 milliGRAM(s) Oral daily  atorvastatin 10 milliGRAM(s) Oral at bedtime  budesonide  80 MICROgram(s)/formoterol 4.5 MICROgram(s) Inhaler 2 Puff(s) Inhalation two times a day  carvedilol 3.125 milliGRAM(s) Oral every 12 hours  cloNIDine 0.2 milliGRAM(s) Oral two times a day  cyanocobalamin 1000 MICROGram(s) Oral daily  dextrose 5%. 1000 milliLiter(s) (50 mL/Hr) IV Continuous <Continuous>  dextrose 5%. 1000 milliLiter(s) (100 mL/Hr) IV Continuous <Continuous>  dextrose 50% Injectable 25 Gram(s) IV Push once  dextrose 50% Injectable 12.5 Gram(s) IV Push once  dextrose 50% Injectable 25 Gram(s) IV Push once  doxazosin Oral Tab/Cap - Peds 4 milliGRAM(s) Oral two times a day  famotidine    Tablet 40 milliGRAM(s) Oral two times a day  folic acid 1 milliGRAM(s) Oral daily  furosemide    Tablet 40 milliGRAM(s) Oral daily  glucagon  Injectable 1 milliGRAM(s) IntraMuscular once  hydrALAZINE 100 milliGRAM(s) Oral three times a day  insulin lispro (ADMELOG) corrective regimen sliding scale   SubCutaneous three times a day before meals  insulin lispro (ADMELOG) corrective regimen sliding scale   SubCutaneous at bedtime  isosorbide   mononitrate ER Tablet (IMDUR) 30 milliGRAM(s) Oral daily  lactobacillus acidophilus 1 Tablet(s) Oral two times a day  lamoTRIgine 100 milliGRAM(s) Oral daily  pantoprazole    Tablet 40 milliGRAM(s) Oral before breakfast  potassium chloride    Tablet ER 20 milliEquivalent(s) Oral daily  venlafaxine XR. 150 milliGRAM(s) Oral daily    MEDICATIONS  (PRN):  acetaminophen     Tablet .. 650 milliGRAM(s) Oral every 6 hours PRN Temp greater or equal to 38C (100.4F), Mild Pain (1 - 3)  dextrose Oral Gel 15 Gram(s) Oral once PRN Blood Glucose LESS THAN 70 milliGRAM(s)/deciliter  ondansetron Injectable 4 milliGRAM(s) IV Push every 8 hours PRN Nausea and/or Vomiting  traMADol 50 milliGRAM(s) Oral every 8 hours PRN Moderate Pain (4 - 6)        Vital Signs Last 24 Hrs  T(C): 36.6 (14 Apr 2022 20:11), Max: 36.6 (14 Apr 2022 20:11)  T(F): 97.9 (14 Apr 2022 20:11), Max: 97.9 (14 Apr 2022 20:11)  HR: 67 (14 Apr 2022 20:11) (61 - 72)  BP: 137/75 (14 Apr 2022 20:11) (137/75 - 162/78)  BP(mean): --  RR: 18 (14 Apr 2022 20:11) (18 - 18)  SpO2: 95% (14 Apr 2022 20:11) (95% - 97%)    PHYSICAL EXAM:    ABDOMEN: soft, NT    Nikko: CBI in progress.    LABS:                        8.1    5.07  )-----------( 211      ( 15 Apr 2022 08:39 )             24.4     04-14    140  |  110<H>  |  29<H>  ----------------------------<  112<H>  3.5   |  20<L>  |  2.00<H>    Ca    8.6      14 Apr 2022 08:28

## 2022-04-15 NOTE — PROGRESS NOTE ADULT - SUBJECTIVE AND OBJECTIVE BOX
Patient is a 77y old  Male who presents with a chief complaint of Hematuria (14 Apr 2022 10:23)    Patient seen in follow up for CKD 4.        PAST MEDICAL HISTORY:  Hypertension    Diabetes mellitus    Lupus    Hepatitis C    Anxiety and depression    CAD (coronary artery disease)    Diverticulosis    Hyperlipidemia    HTN (hypertension)    HLD (hyperlipidemia)    Atrial fibrillation    CAD (coronary artery disease)    Type 2 diabetes mellitus    Anxiety    History of diverticulitis    Diverticulosis    Afib    Stage 3 chronic kidney disease    Anemia of chronic disease    Chronic diastolic congestive heart failure    Multiple thyroid nodules    H/O: upper GI bleed      MEDICATIONS  (STANDING):  amLODIPine   Tablet 10 milliGRAM(s) Oral daily  atorvastatin 10 milliGRAM(s) Oral at bedtime  budesonide  80 MICROgram(s)/formoterol 4.5 MICROgram(s) Inhaler 2 Puff(s) Inhalation two times a day  carvedilol 3.125 milliGRAM(s) Oral every 12 hours  cloNIDine 0.2 milliGRAM(s) Oral two times a day  cyanocobalamin 1000 MICROGram(s) Oral daily  dextrose 5%. 1000 milliLiter(s) (50 mL/Hr) IV Continuous <Continuous>  dextrose 5%. 1000 milliLiter(s) (100 mL/Hr) IV Continuous <Continuous>  dextrose 50% Injectable 25 Gram(s) IV Push once  dextrose 50% Injectable 12.5 Gram(s) IV Push once  dextrose 50% Injectable 25 Gram(s) IV Push once  doxazosin Oral Tab/Cap - Peds 4 milliGRAM(s) Oral two times a day  famotidine    Tablet 40 milliGRAM(s) Oral two times a day  folic acid 1 milliGRAM(s) Oral daily  furosemide    Tablet 40 milliGRAM(s) Oral daily  glucagon  Injectable 1 milliGRAM(s) IntraMuscular once  hydrALAZINE 100 milliGRAM(s) Oral three times a day  insulin lispro (ADMELOG) corrective regimen sliding scale   SubCutaneous three times a day before meals  insulin lispro (ADMELOG) corrective regimen sliding scale   SubCutaneous at bedtime  isosorbide   mononitrate ER Tablet (IMDUR) 30 milliGRAM(s) Oral daily  lactobacillus acidophilus 1 Tablet(s) Oral two times a day  lamoTRIgine 100 milliGRAM(s) Oral daily  pantoprazole    Tablet 40 milliGRAM(s) Oral before breakfast  potassium chloride    Tablet ER 20 milliEquivalent(s) Oral daily  potassium chloride   Powder 40 milliEquivalent(s) Oral every 4 hours  venlafaxine XR. 150 milliGRAM(s) Oral daily    MEDICATIONS  (PRN):  acetaminophen     Tablet .. 650 milliGRAM(s) Oral every 6 hours PRN Temp greater or equal to 38C (100.4F), Mild Pain (1 - 3)  dextrose Oral Gel 15 Gram(s) Oral once PRN Blood Glucose LESS THAN 70 milliGRAM(s)/deciliter  ondansetron Injectable 4 milliGRAM(s) IV Push every 8 hours PRN Nausea and/or Vomiting  traMADol 50 milliGRAM(s) Oral every 8 hours PRN Moderate Pain (4 - 6)    T(C): 36.6 (04-14-22 @ 20:11), Max: 36.7 (04-13-22 @ 14:43)  HR: 67 (04-14-22 @ 20:11) (57 - 81)  BP: 137/75 (04-14-22 @ 20:11) (114/75 - 168/83)  RR: 18 (04-14-22 @ 20:11)  SpO2: 95% (04-14-22 @ 20:11)  Wt(kg): --  I&O's Detail    14 Apr 2022 07:01  -  15 Apr 2022 07:00  --------------------------------------------------------  IN:  Total IN: 0 mL    OUT:    Ureteral Catheter (mL): 5500 mL  Total OUT: 5500 mL    Total NET: -5500 mL              PHYSICAL EXAM:  General: No distress  Respiratory: b/l air entry  Cardiovascular: S1 S2  Gastrointestinal: soft  Extremities:  edema                         LABORATORY:                        8.1    5.07  )-----------( 211      ( 15 Apr 2022 08:39 )             24.4     04-15    141  |  108  |  22  ----------------------------<  122<H>  3.3<L>   |  26  |  1.80<H>    Ca    9.1      15 Apr 2022 08:39  Phos  3.5     04-15  Mg     2.3     04-15    TPro  5.6<L>  /  Alb  2.9<L>  /  TBili  0.2  /  DBili  x   /  AST  12<L>  /  ALT  16  /  AlkPhos  60  04-15    Sodium, Serum: 141 mmol/L (04-15 @ 08:39)  Sodium, Serum: 140 mmol/L (04-14 @ 08:28)    Potassium, Serum: 3.3 mmol/L (04-15 @ 08:39)  Potassium, Serum: 3.5 mmol/L (04-14 @ 08:28)    Hemoglobin: 8.1 g/dL (04-15 @ 08:39)  Hemoglobin: 8.5 g/dL (04-14 @ 17:48)  Hemoglobin: 8.4 g/dL (04-14 @ 08:28)  Hemoglobin: 9.4 g/dL (04-13 @ 08:46)    Creatinine, Serum 1.80 (04-15 @ 08:39)  Creatinine, Serum 2.00 (04-14 @ 08:28)  Creatinine, Serum 2.30 (04-13 @ 08:46)        LIVER FUNCTIONS - ( 15 Apr 2022 08:39 )  Alb: 2.9 g/dL / Pro: 5.6 g/dL / ALK PHOS: 60 U/L / ALT: 16 U/L / AST: 12 U/L / GGT: x

## 2022-04-15 NOTE — PROGRESS NOTE ADULT - SUBJECTIVE AND OBJECTIVE BOX
[facility] -- Fifi Bliss, Kalpesh Valdovinos, Abraham Sheridan Savella, [place]ger  Office # [phone]    Follow Up:  Hx of Afib on Eliquis, now with hematuria; CAD s/p stents    Subjective/Observations: Sleeping on supine position, tolerating RA.  Easily awakened with no complaints at present    REVIEW OF SYSTEMS: All other review of systems is negative unless indicated above  PAST MEDICAL & SURGICAL HISTORY:  HTN (hypertension)  c/b multiple episodes of hypertensive urgency    HLD (hyperlipidemia)    Atrial fibrillation  first documented on EKG 10/7/2021    CAD (coronary artery disease)  s/p stents (not on plavix)    Type 2 diabetes mellitus  not on home insulin/ Meds    Anxiety  Depression  multiple psych medications    History of diverticulitis  07/2021    Diverticulosis  c/b GIB in 2020    Afib  on AC    Stage 3 chronic kidney disease    Anemia of chronic disease  Monoclonal Gammopathy-MGUS  pRBC transfusion 10/15/21    Chronic diastolic congestive heart failure    Multiple thyroid nodules    H/O: upper GI bleed  4/22, small Bowel Bleed, 5 u pRBC Transfusion 4/22  S/P Colonoscopy- Diverticulosis  S/P Capsule Endoscopy done -[facility] -No active bleeding    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  Had surgery ( April 2013 )    S/P tonsillectomy    S/P arthroscopic knee surgery  Bilateral ( 2005 )    Torsion of testicle  Had surgery at age 14    Pilonidal cyst  Had surgery ( 1969 )    S/P cataract surgery  Bilateral    H/O hernia repair    MEDICATIONS  (STANDING):  amLODIPine   Tablet 10 milliGRAM(s) Oral daily  atorvastatin 10 milliGRAM(s) Oral at bedtime  budesonide  80 MICROgram(s)/formoterol 4.5 MICROgram(s) Inhaler 2 Puff(s) Inhalation two times a day  carvedilol 3.125 milliGRAM(s) Oral every 12 hours  cloNIDine 0.2 milliGRAM(s) Oral two times a day  cyanocobalamin 1000 MICROGram(s) Oral daily  dextrose 5%. 1000 milliLiter(s) (50 mL/Hr) IV Continuous <Continuous>  dextrose 5%. 1000 milliLiter(s) (100 mL/Hr) IV Continuous <Continuous>  dextrose 50% Injectable 25 Gram(s) IV Push once  dextrose 50% Injectable 12.5 Gram(s) IV Push once  dextrose 50% Injectable 25 Gram(s) IV Push once  doxazosin Oral Tab/Cap - Peds 4 milliGRAM(s) Oral two times a day  famotidine    Tablet 40 milliGRAM(s) Oral two times a day  folic acid 1 milliGRAM(s) Oral daily  furosemide    Tablet 40 milliGRAM(s) Oral daily  glucagon  Injectable 1 milliGRAM(s) IntraMuscular once  hydrALAZINE 100 milliGRAM(s) Oral three times a day  insulin lispro (ADMELOG) corrective regimen sliding scale   SubCutaneous three times a day before meals  insulin lispro (ADMELOG) corrective regimen sliding scale   SubCutaneous at bedtime  isosorbide   mononitrate ER Tablet (IMDUR) 30 milliGRAM(s) Oral daily  lactobacillus acidophilus 1 Tablet(s) Oral two times a day  lamoTRIgine 100 milliGRAM(s) Oral daily  pantoprazole    Tablet 40 milliGRAM(s) Oral before breakfast  potassium chloride    Tablet ER 20 milliEquivalent(s) Oral daily  potassium chloride   Powder 40 milliEquivalent(s) Oral every 4 hours  venlafaxine XR. 150 milliGRAM(s) Oral daily    MEDICATIONS  (PRN):  acetaminophen     Tablet .. 650 milliGRAM(s) Oral every 6 hours PRN Temp greater or equal to 38C (100.4F), Mild Pain (1 - 3)  dextrose Oral Gel 15 Gram(s) Oral once PRN Blood Glucose LESS THAN 70 milliGRAM(s)/deciliter  ondansetron Injectable 4 milliGRAM(s) IV Push every 8 hours PRN Nausea and/or Vomiting  traMADol 50 milliGRAM(s) Oral every 8 hours PRN Moderate Pain (4 - 6)    Allergies    No Known Allergies    Intolerances    Vital Signs Last 24 Hrs  T(C): 36.2 (15 Apr 2022 11:59), Max: 36.6 (14 Apr 2022 20:11)  T(F): 97.2 (15 Apr 2022 11:59), Max: 97.9 (14 Apr 2022 20:11)  HR: 65 (15 Apr 2022 11:59) (61 - 72)  BP: 158/78 (15 Apr 2022 11:59) (137/75 - 162/78)  BP(mean): --  RR: 18 (15 Apr 2022 11:59) (18 - 18)  SpO2: 97% (15 Apr 2022 11:59) (95% - 97%)  I&O's Summary    14 Apr 2022 07:01  -  15 Apr 2022 07:00  --------------------------------------------------------  IN: 0 mL / OUT: 5500 mL / NET: -5500 mL      PHYSICAL EXAM:  TELE: Not on tele  Constitutional: NAD, awake and alert, well-developed  HEENT: Moist Mucous Membranes, Anicteric  Pulmonary: Non-labored, breath sounds are clear bilaterally, No wheezing, rales or rhonchi  Cardiovascular: IRRR, S1 and S2, No murmurs, rubs, gallops or clicks  Gastrointestinal: Bowel Sounds present, soft, nontender.   : CBI with hematura  Lymph: No peripheral edema. No lymphadenopathy.  Skin: No visible rashes or ulcers. Very pale in color.  Psych:  Mood & affect appropriate  LABS: All Labs Reviewed:                        8.1    5.07  )-----------( 211      ( 15 Apr 2022 08:39 )             24.4                         8.5    x     )-----------( x        ( 14 Apr 2022 17:48 )             25.4                         8.4    5.99  )-----------( 208      ( 14 Apr 2022 08:28 )             24.7     15 Apr 2022 08:39    141    |  108    |  22     ----------------------------<  122    3.3     |  26     |  1.80   14 Apr 2022 08:28    140    |  110    |  29     ----------------------------<  112    3.5     |  20     |  2.00   13 Apr 2022 08:46    139    |  107    |  31     ----------------------------<  116    3.9     |  22     |  2.30     Ca    9.1        15 Apr 2022 08:39  Ca    8.6        14 Apr 2022 08:28  Ca    9.6        13 Apr 2022 08:46  Phos  3.5       15 Apr 2022 08:39  Mg     2.3       15 Apr 2022 08:39    TPro  5.6    /  Alb  2.9    /  TBili  0.2    /  DBili  x      /  AST  12     /  ALT  16     /  AlkPhos  60     15 Apr 2022 08:39  TPro  6.5    /  Alb  3.3    /  TBili  0.3    /  DBili  x      /  AST  18     /  ALT  23     /  AlkPhos  73     13 Apr 2022 08:46    PT/INR - ( 15 Apr 2022 08:39 )   PT: 13.2 sec;   INR: 1.13 ratio      PTT - ( 15 Apr 2022 08:39 )  PTT:36.2 sec       EXAM:  ECHO TTE WO CON COMP W DOPP         PROCEDURE DATE:  01/06/2022        INTERPRETATION:  INDICATION: Dyspnea  Sonographer PH    Blood Pressure 177/86    Height 172.7 cm     Weight 104.4 kg    Dimensions:  LA 4.2       Normal Values: 2.0 - 4.0 cm  Ao 3.7        Normal Values: 2.0 - 3.8 cm  SEPTUM 1.4       Normal Values: 0.6 - 1.2 cm  PWT 1.4       Normal Values: 0.6 - 1.1 cm  LVIDd 5.2         Normal Values: 3.0 - 5.6 cm  LVIDs 4.2         Normal Values: 1.8 - 4.0 cm      OBSERVATIONS:  Technically difficult study  Mitral Valve: Mitral annular calcification with thickened leaflets, mild   MR.  Aortic Valve/Aorta: Calcified trileaflet aortic valve with decreased   opening. Peak transaortic valve gradient is 29.4 mmHg with a mean   transaortic valve gradient 14.7 mmHg. This consistent with mild aortic   stenosis.  Tricuspid Valve: Mild TR.  Pulmonic Valve: Not well-visualized  Left Atrium: Enlarged  Right Atrium: Not well-visualized  Left Ventricle: Moderate left ventricular hypertrophy with normal   systolic function, estimated LVEF of 55%.  Right Ventricle: Grossly normal size and systolic function.  Pericardium: no significant pericardial effusion.  Pulmonary/RV Pressure: estimated PA systolic pressure of 32mmHg    IMPRESSION:  Technically difficult study  Moderate left ventricular hypertrophy with normal systolic function,   estimated LVEF of 55%.  Grossly normal RV size and systolic function.  Calcified trileaflet aortic valve with mild aortic stenosis  Mild MR and TR.  No significant pericardial effusion.    --- End of Report ---      VIET GREENWOOD MD; Attending Cardiologist  This document has been electronically signed. Jan 7 2022 11:19AM     ACC: [id number] EXAM:  CT ABDOMEN AND PELVIS                        ACC: [id number] EXAM:  CT CHEST                          PROCEDURE DATE:  03/30/2022      INTERPRETATION:  CLINICAL INFORMATION: Rectal bleed and shortness of   breath    COMPARISON: CT chest 10/18/2021. CT chest abdomen pelvis 10/7/2021. Right   upper quadrant ultrasound 8/20/2021.    CONTRAST/COMPLICATIONS:  IV Contrast: NONE  Oral Contrast: NONE  Complications: None reported at time of study completion    PROCEDURE:  CT of the Chest, Abdomen and Pelvis was performed.  Sagittal and coronal reformats were performed.    FINDINGS:    CHEST:  LUNGS AND LARGE AIRWAYS: Central airways are patent. No focal   consolidation. Dependent scarring and minimal groundglass. Few scattered   2 mm pulmonary nodules. For reference, right upper lobe 2 mm nodule image   29 series 4, right lower lobe 2 mm nodule image 88 series 4, left upper   lobe 2 mm nodule image 26 series 4.  PLEURA: No pleural effusion or pneumothorax.  VESSELS: Atheromatous changes of the thoracic aorta. Main pulmonary   artery size is mildly enlarged, 3.2 cm.  HEART: Qualitatively, heart size is prominent. Aortic root/valve   calcification and coronary artery calcification. Trace pericardial fluid.  MEDIASTINUM AND SANDIP: No enlarged lymph nodes of the thorax. Small fluid   of the right infrahilar region near the right inferior pulmonary vein is   unchanged on multiple prior studies dating back to July 2021, possibly a   pericardial cyst or recess. Esophagus is underdistended.  CHEST WALL AND LOWER NECK: No chest wall hematoma.    ABDOMEN AND PELVIS:    Solid organ evaluation limited due to noncontrast technique.    LIVER: Borderline enlarged liver measuring 18 cm in craniocaudal   dimension.  BILE DUCTS:No biliary distention  GALLBLADDER: Unremarkable CT appearance  SPLEEN: Normal size  PANCREAS: No main ductal dilatation  ADRENALS: Left adrenal gland thickening, similar to prior  KIDNEYS/URETERS: No hydronephrosis. Renal cysts, suboptimally   characterized due to lack of contrast.    BLADDER: Minimally distended.  REPRODUCTIVE ORGANS: Few coarse calcifications of the prostate    BOWEL: Stomach is underdistended. No small bowel distention. Normal   appendix. Mild stool burden of the colon limits evaluation of the colonic   mucosa. Colonic diverticulosis, without evidence of acute diverticulitis.  PERITONEUM: No ascites.  VESSELS: No aneurysm of the abdominal aorta. Aortoiliac atheromatous   change.  RETROPERITONEUM/LYMPH NODES: Small volume nodes.  ABDOMINAL WALL: Postprocedural change at the umbilicus. New left   iliopsoas muscle calcification, image 158 series 2 since 10/6/2021.   Question prior injury / myositis ossificans.  BONES: Degenerative changes. Old left lateral rib fractures.    IMPRESSION:    Noncontrast study.    CHEST:  1. No large consolidation. Dependent scarring and minimal groundglass.  2. Few subcentimeter 2 mm nodules can be followed with dedicated chest CT   in 12 months based on patient risk factors.  3. Coronary artery calcification. Aortic root/valve calcification.    ABDOMEN/PELVIS:  1. Colonic diverticulosis, without diverticulitis. Given the history of   rectal bleeding, correlate with hemodynamics and hematocrit to guide   further management.    --- End of Report ---    JAZ OCONNOR M.D., ATTENDING RADIOLOGIST  This document has been electronically signed. Mar 30 2022 12:50PM    Ventricular Rate 52 BPM    Atrial Rate 52 BPM    P-R Interval 348 ms    QRS Duration 116 ms    Q-T Interval 496 ms    QTC Calculation(Bazett) 461 ms    P Axis 49 degrees    R Axis -11 degrees    T Axis 179 degrees    Diagnosis Line Sinus bradycardia with 1st degree AV block  Left ventricular hypertrophy with QRS widening and repolarization abnormality ( R in aVL , Soso product )  Abnormal ECG  When compared with ECG of 30-MAR-2022 13:01,  No significant change was found  Confirmed by Bonifacio BARRERA, Victorino ([id number]) on 4/13/2022 10:48:39 AM

## 2022-04-15 NOTE — PROGRESS NOTE ADULT - PROBLEM SELECTOR PLAN 9
Chronic  - Continue home atorvastatin Pt recently admitted to [facility] for GIB,  got 5 u pRBC in total at [facility]   -Pt was transferred to [facility] for Capsule endoscopy  and d/c'd 4/11  -Denies GIB since hospitalization  -Started Pantoprazole qd  -Continue home Pepcid  -Continue to monitor for signs/symptoms of GIB

## 2022-04-16 LAB
ANION GAP SERPL CALC-SCNC: 7 MMOL/L — SIGNIFICANT CHANGE UP (ref 5–17)
BASOPHILS # BLD AUTO: 0.02 K/UL — SIGNIFICANT CHANGE UP (ref 0–0.2)
BASOPHILS NFR BLD AUTO: 0.4 % — SIGNIFICANT CHANGE UP (ref 0–2)
BUN SERPL-MCNC: 20 MG/DL — SIGNIFICANT CHANGE UP (ref 7–23)
CALCIUM SERPL-MCNC: 9.4 MG/DL — SIGNIFICANT CHANGE UP (ref 8.5–10.1)
CHLORIDE SERPL-SCNC: 110 MMOL/L — HIGH (ref 96–108)
CO2 SERPL-SCNC: 24 MMOL/L — SIGNIFICANT CHANGE UP (ref 22–31)
CREAT SERPL-MCNC: 1.6 MG/DL — HIGH (ref 0.5–1.3)
EGFR: 44 ML/MIN/1.73M2 — LOW
EOSINOPHIL # BLD AUTO: 0.15 K/UL — SIGNIFICANT CHANGE UP (ref 0–0.5)
EOSINOPHIL NFR BLD AUTO: 3.3 % — SIGNIFICANT CHANGE UP (ref 0–6)
GLUCOSE SERPL-MCNC: 97 MG/DL — SIGNIFICANT CHANGE UP (ref 70–99)
HCT VFR BLD CALC: 26.3 % — LOW (ref 39–50)
HGB BLD-MCNC: 8.5 G/DL — LOW (ref 13–17)
IMM GRANULOCYTES NFR BLD AUTO: 0.2 % — SIGNIFICANT CHANGE UP (ref 0–1.5)
LYMPHOCYTES # BLD AUTO: 1.07 K/UL — SIGNIFICANT CHANGE UP (ref 1–3.3)
LYMPHOCYTES # BLD AUTO: 23.4 % — SIGNIFICANT CHANGE UP (ref 13–44)
MCHC RBC-ENTMCNC: 29.5 PG — SIGNIFICANT CHANGE UP (ref 27–34)
MCHC RBC-ENTMCNC: 32.3 GM/DL — SIGNIFICANT CHANGE UP (ref 32–36)
MCV RBC AUTO: 91.3 FL — SIGNIFICANT CHANGE UP (ref 80–100)
MONOCYTES # BLD AUTO: 0.3 K/UL — SIGNIFICANT CHANGE UP (ref 0–0.9)
MONOCYTES NFR BLD AUTO: 6.6 % — SIGNIFICANT CHANGE UP (ref 2–14)
NEUTROPHILS # BLD AUTO: 3.03 K/UL — SIGNIFICANT CHANGE UP (ref 1.8–7.4)
NEUTROPHILS NFR BLD AUTO: 66.1 % — SIGNIFICANT CHANGE UP (ref 43–77)
NRBC # BLD: 0 /100 WBCS — SIGNIFICANT CHANGE UP (ref 0–0)
PLATELET # BLD AUTO: 219 K/UL — SIGNIFICANT CHANGE UP (ref 150–400)
POTASSIUM SERPL-MCNC: 4.2 MMOL/L — SIGNIFICANT CHANGE UP (ref 3.5–5.3)
POTASSIUM SERPL-SCNC: 4.2 MMOL/L — SIGNIFICANT CHANGE UP (ref 3.5–5.3)
RBC # BLD: 2.88 M/UL — LOW (ref 4.2–5.8)
RBC # FLD: 16.2 % — HIGH (ref 10.3–14.5)
SODIUM SERPL-SCNC: 141 MMOL/L — SIGNIFICANT CHANGE UP (ref 135–145)
WBC # BLD: 4.58 K/UL — SIGNIFICANT CHANGE UP (ref 3.8–10.5)
WBC # FLD AUTO: 4.58 K/UL — SIGNIFICANT CHANGE UP (ref 3.8–10.5)

## 2022-04-16 PROCEDURE — 99232 SBSQ HOSP IP/OBS MODERATE 35: CPT

## 2022-04-16 RX ADMIN — Medication 1 MILLIGRAM(S): at 12:04

## 2022-04-16 RX ADMIN — FAMOTIDINE 40 MILLIGRAM(S): 10 INJECTION INTRAVENOUS at 06:17

## 2022-04-16 RX ADMIN — CARVEDILOL PHOSPHATE 3.12 MILLIGRAM(S): 80 CAPSULE, EXTENDED RELEASE ORAL at 05:38

## 2022-04-16 RX ADMIN — Medication 4 MILLIGRAM(S): at 05:38

## 2022-04-16 RX ADMIN — PREGABALIN 1000 MICROGRAM(S): 225 CAPSULE ORAL at 12:02

## 2022-04-16 RX ADMIN — ISOSORBIDE MONONITRATE 30 MILLIGRAM(S): 60 TABLET, EXTENDED RELEASE ORAL at 12:01

## 2022-04-16 RX ADMIN — Medication 40 MILLIGRAM(S): at 05:38

## 2022-04-16 RX ADMIN — PANTOPRAZOLE SODIUM 40 MILLIGRAM(S): 20 TABLET, DELAYED RELEASE ORAL at 05:37

## 2022-04-16 RX ADMIN — ATORVASTATIN CALCIUM 10 MILLIGRAM(S): 80 TABLET, FILM COATED ORAL at 22:43

## 2022-04-16 RX ADMIN — Medication 150 MILLIGRAM(S): at 12:03

## 2022-04-16 RX ADMIN — Medication 1 TABLET(S): at 17:37

## 2022-04-16 RX ADMIN — AMLODIPINE BESYLATE 10 MILLIGRAM(S): 2.5 TABLET ORAL at 05:38

## 2022-04-16 RX ADMIN — Medication 1 TABLET(S): at 05:38

## 2022-04-16 RX ADMIN — LAMOTRIGINE 100 MILLIGRAM(S): 25 TABLET, ORALLY DISINTEGRATING ORAL at 12:05

## 2022-04-16 RX ADMIN — CARVEDILOL PHOSPHATE 3.12 MILLIGRAM(S): 80 CAPSULE, EXTENDED RELEASE ORAL at 17:37

## 2022-04-16 RX ADMIN — Medication 100 MILLIGRAM(S): at 16:01

## 2022-04-16 RX ADMIN — Medication 100 MILLIGRAM(S): at 05:38

## 2022-04-16 RX ADMIN — Medication 4 MILLIGRAM(S): at 17:36

## 2022-04-16 RX ADMIN — FAMOTIDINE 40 MILLIGRAM(S): 10 INJECTION INTRAVENOUS at 17:37

## 2022-04-16 RX ADMIN — Medication 0.2 MILLIGRAM(S): at 17:37

## 2022-04-16 RX ADMIN — Medication 100 MILLIGRAM(S): at 21:53

## 2022-04-16 RX ADMIN — Medication 0.2 MILLIGRAM(S): at 05:38

## 2022-04-16 NOTE — PROGRESS NOTE ADULT - PROBLEM SELECTOR PLAN 5
Chronic Diastolic CHF   -Continue home Lasix 40 mg daily ,  -Last echo 1/2022 Moderate left ventricular hypertrophy with normal systolic function, estimated LVEF of 55%. Grossly normal RV size and systolic function. Calcified trileaflet aortic valve with mild aortic stenosis. Mild MR and TR. No significant pericardial effusion.  -Dash/ TLC Diet  Cardio DR. Bliss's group following

## 2022-04-16 NOTE — PROGRESS NOTE ADULT - SUBJECTIVE AND OBJECTIVE BOX
Patient is a 77y old  Male who presents with a chief complaint of Hematuria (13 Apr 2022 16:16)    HPI:  76yo M with PMH of Afib (on eliquis), MGUS, depression, CAD s/p stents, HLD, HTN, CKD, DM2, diverticulosis, Anemia , Diastolic  CHF (Echo 1/22 LVEF 55%) and BPH with recent GI Bleed 4/22 with multiple pRBC transfusions recently d/chio from [facility] 4/11/22 presents to the ED with hematuria. Patient states he went to void at 3AM and noticed it was difficult for him to urinate at first, but when he was able to void, he saw a lot of mikayla blood. Patient had 5 episodes of mikayla blood without clots along with dysuria. Denies trauma, falls, penile lesion. Per chart review, patient was admitted to [facility] on 3/30/22 for a rectal bleed and was transfused 4-5 units of pRBC and transferred to [facility] for further workup after a bleeding scan showed a jejunal bleed. During this time, Eliquis was held. Capsule endoscopy revealed possible small bleeding site. Pt's Eliquis was resumed on 4/8 with no reoccurrence of a GIB. Pt last took Eliquis this AM.     Admits to HA, dizziness, nausea, decreased PO intake. Denies fever/chills, chest pain, palpitations, vomiting, constipation, diarrhea, hematochezia.     ED course:   Vitals:  BP:106/71      HR: 61     Temp: 97.2      RR:18     O2: 96%  Labs significant for: H/H 9.4/28.1, PT 21.7, INR 1.84, PTT 45.8, BUN/Cr 31/2.3, glucose 116  EKG: sinus bradycardia with 1st degree AV block, LVH with QRS widening and repolarization abnormality (13 Apr 2022 11:54)    INTERVAL HPI:  4/14/22: Pt seen and examined at bedside. Complaining of 9/10 lower abdominal pain, nausea, as well as lots of mikayla blood loss from the penis. Bladder scan revealed >400cc of urine, pt attempted to use the bathroom but was actively bleeding. Urology Dr. Kiser spoke to the surgical PA, irrigated bladder and removed a copious amount of clots. CBI was ordered. Pt also complaining of b/l knee pain & headache. Denies fever/chills, chest pain, palpitations, vomiting/ constipation/ diarrhea/ hematochezia. Hgb dropped from 9.4 to 8.4, but otherwise hemodynamically stable.   04/15/22 Patient seen and examined at bedside. Patient states mild discomfort in pelvic area.  Hb dropped from 8.4 to 8.1 this AM , Low stable H/H   04/16/22 Patient seen and examined at bedside. Patient states that did not sleep well last night due to CBI leak. Today will stop CBI per uro recommendation. H/H low stable.     OVERNIGHT EVENTS:  Per the pt, he was anuric overnight, only passed a lot of mikayla blood in the toilet.     Home Medications:  aspirin 81 mg oral tablet: 1 tab(s) orally once a day (13 Apr 2022 15:41)  budesonide-formoterol 80 mcg-4.5 mcg/inh inhalation aerosol: 2 puff(s) inhaled 2 times a day (13 Apr 2022 15:41)  cloNIDine 0.2 mg oral tablet: 1 tab(s) orally 2 times a day (13 Apr 2022 15:41)  diclofenac 1% topical gel: Apply topically to affected area 3 times a day (13 Apr 2022 15:41)  doxazosin 4 mg oral tablet: 1 tab(s) orally 2 times a day (13 Apr 2022 15:41)  Eliquis 5 mg oral tablet: 1 tab(s) orally 2 times a day (13 Apr 2022 15:41)  famotidine 40 mg oral tablet: 1 tab(s) orally 2 times a day (13 Apr 2022 15:41)  isosorbide mononitrate 30 mg oral tablet, extended release: 1 tab(s) orally once a day (in the morning) (13 Apr 2022 15:41)  lactobacillus acidophilus oral capsule: 1 cap(s) orally 2 times a day (13 Apr 2022 15:41)  lamoTRIgine 100 mg oral tablet: 1 tab(s) orally once a day (13 Apr 2022 15:41)  metFORMIN 500 mg oral tablet: 1 tab(s) orally 2 times a day (13 Apr 2022 15:41)  oxybutynin 15 mg/24 hr oral tablet, extended release: 1 tab(s) orally once a day (13 Apr 2022 15:41)  potassium chloride 20 mEq oral tablet, extended release: 1 tab(s) orally once a day (13 Apr 2022 15:41)  Vitamin B-12 1000 mcg oral tablet: 1 tab(s) orally once a day (13 Apr 2022 15:41)      MEDICATIONS  (STANDING):  MEDICATIONS  (STANDING):  amLODIPine   Tablet 10 milliGRAM(s) Oral daily  atorvastatin 10 milliGRAM(s) Oral at bedtime  budesonide  80 MICROgram(s)/formoterol 4.5 MICROgram(s) Inhaler 2 Puff(s) Inhalation two times a day  carvedilol 3.125 milliGRAM(s) Oral every 12 hours  cloNIDine 0.2 milliGRAM(s) Oral two times a day  cyanocobalamin 1000 MICROGram(s) Oral daily  dextrose 5%. 1000 milliLiter(s) (100 mL/Hr) IV Continuous <Continuous>  dextrose 5%. 1000 milliLiter(s) (50 mL/Hr) IV Continuous <Continuous>  dextrose 50% Injectable 25 Gram(s) IV Push once  dextrose 50% Injectable 12.5 Gram(s) IV Push once  dextrose 50% Injectable 25 Gram(s) IV Push once  doxazosin Oral Tab/Cap - Peds 4 milliGRAM(s) Oral two times a day  famotidine    Tablet 40 milliGRAM(s) Oral two times a day  folic acid 1 milliGRAM(s) Oral daily  furosemide    Tablet 40 milliGRAM(s) Oral daily  glucagon  Injectable 1 milliGRAM(s) IntraMuscular once  hydrALAZINE 100 milliGRAM(s) Oral three times a day  insulin lispro (ADMELOG) corrective regimen sliding scale   SubCutaneous three times a day before meals  insulin lispro (ADMELOG) corrective regimen sliding scale   SubCutaneous at bedtime  isosorbide   mononitrate ER Tablet (IMDUR) 30 milliGRAM(s) Oral daily  lactobacillus acidophilus 1 Tablet(s) Oral two times a day  lamoTRIgine 100 milliGRAM(s) Oral daily  pantoprazole    Tablet 40 milliGRAM(s) Oral before breakfast  potassium chloride    Tablet ER 20 milliEquivalent(s) Oral daily  venlafaxine XR. 150 milliGRAM(s) Oral daily      Allergies    No Known Allergies    Intolerances        Social History:  Tobacco: quit 45 years ago, former 10 pack years smoker   EtOH: denies  recreational drug use: denies  Lives with: alone  Ambulates:  independent  ADLs: independent  Occupation: retired  Vaccinations: Moderna x2 (13 Apr 2022 11:54)      REVIEW OF SYSTEMS:  CONSTITUTIONAL: No fever, No chills, No fatigue, No myalgia, No Body ache, No Weakness  EYES: No eye pain,  No visual disturbances, No discharge, NO Redness  ENMT:  No ear pain, No nose bleed, No vertigo; No sinus pain, NO throat pain, No Congestion  NECK: No pain, No stiffness  RESPIRATORY: No cough, NO wheezing, No  hemoptysis, NO  shortness of breath  CARDIOVASCULAR: No chest pain, palpitations  GASTROINTESTINAL: Admits to abdominal pain, NO epigastric pain. No nausea, No vomiting; No diarrhea, No constipation. [x ] BM  GENITOURINARY: Admits to dysuria, anuria/oliguria, mikayla blood output, No frequency, No urgency, NO incontinence  NEUROLOGICAL: Admits to headaches, No dizziness, No numbness, No tingling, No tremors, No weakness  EXT: No Swelling, No Pain, No Edema  SKIN:  [  x] No itching, burning, rashes, or lesions   MUSCULOSKELETAL: Admits to b/l knee pain,No Jt swelling; No muscle pain, No back pain  PSYCHIATRIC: No depression,  No anxiety,  No mood swings ,No difficulty sleeping at night  PAIN SCALE: [  ] None  [x  ] Other-mild discomfort  ROS Unable to obtain due to - [  ] Dementia  [  ] Lethargy [  ] Drowsy [  ] Sedated [  ] non verbal  REST OF REVIEW Of SYSTEM - [ x ] Normal         04-13 @ 07:01  -  04-14 @ 07:00  --------------------------------------------------------  IN: 0 mL / OUT: 0 mL / NET: 0 mL        PHYSICAL EXAM:  GENERAL:  [ x ] NAD--appears slightly anxious , [  ] well appearing, [  ] Agitated, [  ] Drowsy,  [  ] Lethargy, [  ] confused   HEAD:  [ x ] Normal, [  ] Other  EYES:  [  ] EOMI, [  ] PERRLA, [x  ] conjunctiva and sclera clear normal, [  ] Other,  [x  ] Pallor,[  ] Discharge  ENMT:  [x  ] Normal, [x  ] Moist mucous membranes, [ x ] Good dentition, [x  ] No Thrush  NECK:  [x  ] Supple, [ x ] No JVD, [  x] Normal thyroid, [  ] Lymphadenopathy [  ] Other  CHEST/LUNG:  [ x ] Clear to auscultation bilaterally, [ x ] Breath Sounds equal B/L, [ x ] poor effort  [ x ] No rales, [x  ] No rhonchi  [x  ]  No wheezing,   HEART:  [ x ] Regular rate and rhythm, [  ] tachycardia, [  ] Bradycardia,  [  ] irregular  [ x ] No murmurs, No rubs, No gallops, [ x ] PPM in place (Mfr:  )  ABDOMEN:  Tender to palpation, bladder distended  [ x ] Soft, [ ] Nontender, [  ] Nondistended, [ x ] No mass, [ x ] Bowel sounds present, [  ] obese  NERVOUS SYSTEM:  [  x] Alert & Oriented X3, [  x] Nonfocal  [  ] Confusion  [  ] Encephalopathic [  ] Sedated [  ] Unable to assess, [  ] Dementia [  ] Other-  EXTREMITIES: [ x ] 2+ Peripheral Pulses, No clubbing, No cyanosis,  [  ] edema B/L lower EXT. [  ] PVD stasis skin changes B/L Lower EXT, [  ] wound  LYMPH: No lymphadenopathy noted  SKIN:  [ x ] No rashes or lesions, [  ] Pressure Ulcers, [  ] ecchymosis, [  ] Skin Tears, [  ] Other    DIET: Diet, DASH/TLC:   Sodium & Cholesterol Restricted (04-13-22 @ 12:27)      LABS:                        .  LABS:                         8.5    4.58  )-----------( 219      ( 16 Apr 2022 07:14 )             26.3     04-16    141  |  110<H>  |  20  ----------------------------<  97  4.2   |  24  |  1.60<H>    Ca    9.4      16 Apr 2022 07:14  Phos  3.5     04-15  Mg     2.3     04-15    TPro  5.6<L>  /  Alb  2.9<L>  /  TBili  0.2  /  DBili  x   /  AST  12<L>  /  ALT  16  /  AlkPhos  60  04-15    PT/INR - ( 15 Apr 2022 08:39 )   PT: 13.2 sec;   INR: 1.13 ratio         PTT - ( 15 Apr 2022 08:39 )  PTT:36.2 sec          RADIOLOGY, EKG & ADDITIONAL TESTS: Reviewed.       Culture - Urine (collected 14 Apr 2022 00:48)  Source: Clean Catch Clean Catch (Midstream)  Final Report (15 Apr 2022 10:43):    Culture grew 3 or more types of organisms which indicate    collection contamination; consider recollection only if clinically    indicated.                             8.4    5.99  )-----------( 208      ( 14 Apr 2022 08:28 )             24.7     14 Apr 2022 08:28    140    |  110    |  29     ----------------------------<  112    3.5     |  20     |  2.00     Ca    8.6        14 Apr 2022 08:28      PT/INR - ( 13 Apr 2022 08:46 )   PT: 21.7 sec;   INR: 1.84 ratio         PTT - ( 13 Apr 2022 08:46 )  PTT:45.8 sec              RADIOLOGY & ADDITIONAL TESTS:      HEALTH ISSUES - PROBLEM Dx:  Hematuria    Afib    GIB (gastrointestinal bleeding)    Anemia    CAD (coronary artery disease)    Acute kidney injury superimposed on CKD    Chronic CHF    HTN (hypertension)    HLD (hyperlipidemia)    DM (diabetes mellitus)    BPH (benign prostatic hyperplasia)    Anxiety and depression    DVT prophylaxis      Consultant(s) Notes Reviewed:  [ x ] YES     Care Discussed with [X] Consultants  [  ] Patient  [ x ] Family [  ] HCP [  ]   [  ] Social Service  [x  ] RN, [  ] Physical Therapy,[  ] Palliative care team  DVT PPX: [  ] Lovenox, [  ] S C Heparin, [  ] Coumadin, [  ] Xarelto, [  ] Eliquis, [  ] Pradaxa, [  ] IV Heparin drip, [ x ] SCD [  ] Contraindication 2 to GI Bleed,[  ] Ambulation [ x ] Contraindicated 2 to  bleed [  ] Contraindicated 2 to Brain Bleed  Advanced directive: [ x ] None, [  ] DNR/DNI Patient is a 77y old  Male who presents with a chief complaint of Hematuria (13 Apr 2022 16:16)    HPI:  78yo M with PMH of Afib (on eliquis), MGUS, depression, CAD s/p stents, HLD, HTN, CKD, DM2, diverticulosis, Anemia , Diastolic  CHF (Echo 1/22 LVEF 55%) and BPH with recent GI Bleed 4/22 with multiple pRBC transfusions recently d/chio from [facility] 4/11/22 presents to the ED with hematuria. Patient states he went to void at 3AM and noticed it was difficult for him to urinate at first, but when he was able to void, he saw a lot of mikayla blood. Patient had 5 episodes of mikayla blood without clots along with dysuria. Denies trauma, falls, penile lesion. Per chart review, patient was admitted to [facility] on 3/30/22 for a rectal bleed and was transfused 4-5 units of pRBC and transferred to [facility] for further workup after a bleeding scan showed a jejunal bleed. During this time, Eliquis was held. Capsule endoscopy revealed possible small bleeding site. Pt's Eliquis was resumed on 4/8 with no reoccurrence of a GIB. Pt last took Eliquis this AM.     Admits to HA, dizziness, nausea, decreased PO intake. Denies fever/chills, chest pain, palpitations, vomiting, constipation, diarrhea, hematochezia.     ED course:   Vitals:  BP:106/71      HR: 61     Temp: 97.2      RR:18     O2: 96%  Labs significant for: H/H 9.4/28.1, PT 21.7, INR 1.84, PTT 45.8, BUN/Cr 31/2.3, glucose 116  EKG: sinus bradycardia with 1st degree AV block, LVH with QRS widening and repolarization abnormality (13 Apr 2022 11:54)    INTERVAL HPI:  4/14/22: Pt seen and examined at bedside. Complaining of 9/10 lower abdominal pain, nausea, as well as lots of mikayla blood loss from the penis. Bladder scan revealed >400cc of urine, pt attempted to use the bathroom but was actively bleeding. Urology Dr. Kiser spoke to the surgical PA, irrigated bladder and removed a copious amount of clots. CBI was ordered. Pt also complaining of b/l knee pain & headache. Denies fever/chills, chest pain, palpitations, vomiting/ constipation/ diarrhea/ hematochezia. Hgb dropped from 9.4 to 8.4, but otherwise hemodynamically stable.   04/15/22 Patient seen and examined at bedside. Patient states mild discomfort in pelvic area.  Hb dropped from 8.4 to 8.1 this AM , Low stable H/H   04/16/22 Patient seen and examined at bedside. Patient states that did not sleep well last night due to CBI leak. Today will stop CBI per uro recommendation. H/H low stable, mild pink urine in mckinnon     OVERNIGHT EVENTS:  Per the pt, he was anuric overnight, only passed a lot of mikayla blood in the toilet.     Home Medications:  aspirin 81 mg oral tablet: 1 tab(s) orally once a day (13 Apr 2022 15:41)  budesonide-formoterol 80 mcg-4.5 mcg/inh inhalation aerosol: 2 puff(s) inhaled 2 times a day (13 Apr 2022 15:41)  cloNIDine 0.2 mg oral tablet: 1 tab(s) orally 2 times a day (13 Apr 2022 15:41)  diclofenac 1% topical gel: Apply topically to affected area 3 times a day (13 Apr 2022 15:41)  doxazosin 4 mg oral tablet: 1 tab(s) orally 2 times a day (13 Apr 2022 15:41)  Eliquis 5 mg oral tablet: 1 tab(s) orally 2 times a day (13 Apr 2022 15:41)  famotidine 40 mg oral tablet: 1 tab(s) orally 2 times a day (13 Apr 2022 15:41)  isosorbide mononitrate 30 mg oral tablet, extended release: 1 tab(s) orally once a day (in the morning) (13 Apr 2022 15:41)  lactobacillus acidophilus oral capsule: 1 cap(s) orally 2 times a day (13 Apr 2022 15:41)  lamoTRIgine 100 mg oral tablet: 1 tab(s) orally once a day (13 Apr 2022 15:41)  metFORMIN 500 mg oral tablet: 1 tab(s) orally 2 times a day (13 Apr 2022 15:41)  oxybutynin 15 mg/24 hr oral tablet, extended release: 1 tab(s) orally once a day (13 Apr 2022 15:41)  potassium chloride 20 mEq oral tablet, extended release: 1 tab(s) orally once a day (13 Apr 2022 15:41)  Vitamin B-12 1000 mcg oral tablet: 1 tab(s) orally once a day (13 Apr 2022 15:41)      MEDICATIONS  (STANDING):  MEDICATIONS  (STANDING):  amLODIPine   Tablet 10 milliGRAM(s) Oral daily  atorvastatin 10 milliGRAM(s) Oral at bedtime  budesonide  80 MICROgram(s)/formoterol 4.5 MICROgram(s) Inhaler 2 Puff(s) Inhalation two times a day  carvedilol 3.125 milliGRAM(s) Oral every 12 hours  cloNIDine 0.2 milliGRAM(s) Oral two times a day  cyanocobalamin 1000 MICROGram(s) Oral daily  dextrose 5%. 1000 milliLiter(s) (100 mL/Hr) IV Continuous <Continuous>  dextrose 5%. 1000 milliLiter(s) (50 mL/Hr) IV Continuous <Continuous>  dextrose 50% Injectable 25 Gram(s) IV Push once  dextrose 50% Injectable 12.5 Gram(s) IV Push once  dextrose 50% Injectable 25 Gram(s) IV Push once  doxazosin Oral Tab/Cap - Peds 4 milliGRAM(s) Oral two times a day  famotidine    Tablet 40 milliGRAM(s) Oral two times a day  folic acid 1 milliGRAM(s) Oral daily  furosemide    Tablet 40 milliGRAM(s) Oral daily  glucagon  Injectable 1 milliGRAM(s) IntraMuscular once  hydrALAZINE 100 milliGRAM(s) Oral three times a day  insulin lispro (ADMELOG) corrective regimen sliding scale   SubCutaneous three times a day before meals  insulin lispro (ADMELOG) corrective regimen sliding scale   SubCutaneous at bedtime  isosorbide   mononitrate ER Tablet (IMDUR) 30 milliGRAM(s) Oral daily  lactobacillus acidophilus 1 Tablet(s) Oral two times a day  lamoTRIgine 100 milliGRAM(s) Oral daily  pantoprazole    Tablet 40 milliGRAM(s) Oral before breakfast  potassium chloride    Tablet ER 20 milliEquivalent(s) Oral daily  venlafaxine XR. 150 milliGRAM(s) Oral daily      Allergies    No Known Allergies    Intolerances        Social History:  Tobacco: quit 45 years ago, former 10 pack years smoker   EtOH: denies  recreational drug use: denies  Lives with: alone  Ambulates:  independent  ADLs: independent  Occupation: retired  Vaccinations: Moderna x2 (13 Apr 2022 11:54)      REVIEW OF SYSTEMS: wants to go home   CONSTITUTIONAL: No fever, No chills, No fatigue, No myalgia, No Body ache, No Weakness  EYES: No eye pain,  No visual disturbances, No discharge, NO Redness  ENMT:  No ear pain, No nose bleed, No vertigo; No sinus pain, NO throat pain, No Congestion  NECK: No pain, No stiffness  RESPIRATORY: No cough, NO wheezing, No  hemoptysis, NO  shortness of breath  CARDIOVASCULAR: No chest pain, palpitations  GASTROINTESTINAL: Admits to abdominal pain, NO epigastric pain. No nausea, No vomiting; No diarrhea, + constipation. [  ] BM  GENITOURINARY: Admits to dysuria, anuria/oliguria, mikayla blood output, No frequency, No urgency, NO incontinence  NEUROLOGICAL: Admits to headaches, No dizziness, No numbness, No tingling, No tremors, No weakness  EXT: No Swelling, No Pain, No Edema  SKIN:  [  x] No itching, burning, rashes, or lesions   MUSCULOSKELETAL: Admits to b/l knee pain,No Jt swelling; No muscle pain, No back pain  PSYCHIATRIC: No depression,  No anxiety,  No mood swings ,No difficulty sleeping at night  PAIN SCALE: [  ] None  [x  ] Other-mild discomfort suprapubic  ROS Unable to obtain due to - [  ] Dementia  [  ] Lethargy [  ] Drowsy [  ] Sedated [  ] non verbal  REST OF REVIEW Of SYSTEM - [ x ] Normal         04-13 @ 07:01  -  04-14 @ 07:00  --------------------------------------------------------  IN: 0 mL / OUT: 0 mL / NET: 0 mL        PHYSICAL EXAM: KATHY mckinnon   GENERAL:  [ x ] NAD--appears slightly anxious , [  ] well appearing, [  ] Agitated, [  ] Drowsy,  [  ] Lethargy, [  ] confused   HEAD:  [ x ] Normal, [  ] Other  EYES:  [ x ] EOMI, [ x ] PERRLA, [x  ] conjunctiva and sclera clear normal, [  ] Other,  [x  ] Pallor,[  ] Discharge  ENMT:  [x  ] Normal, [x  ] Moist mucous membranes, [ x ] Good dentition, [x  ] No Thrush  NECK:  [x  ] Supple, [ x ] No JVD, [  x] Normal thyroid, [  ] Lymphadenopathy [  ] Other  CHEST/LUNG:  [ x ] Clear to auscultation bilaterally, [ x ] Breath Sounds equal B/L, [ x ] poor effort  [ x ] No rales, [x  ] No rhonchi  [x  ]  No wheezing,   HEART:  [ x ] Regular rate and rhythm, [  ] tachycardia, [  ] Bradycardia,  [  ] irregular  [ x ] No murmurs, No rubs, No gallops, [ x ] PPM in place (Mfr:  )  ABDOMEN:  Tender to palpation, bladder distended  [ x ] Soft, [x  ] Nontender, [x  ] Nondistended, [ x ] No mass, [ x ] Bowel sounds present, [x  ] obese  NERVOUS SYSTEM:  [ x] Alert & Oriented X3, [ x] Nonfocal  [  ] Confusion  [  ] Encephalopathic [  ] Sedated [  ] Unable to assess, [  ] Dementia [  ] Other-  EXTREMITIES: [ x ] 2+ Peripheral Pulses, No clubbing, No cyanosis,  [  ] edema B/L lower EXT. [  ] PVD stasis skin changes B/L Lower EXT, [  ] wound  LYMPH: No lymphadenopathy noted  SKIN:  [ x ] No rashes or lesions, [  ] Pressure Ulcers, [  ] ecchymosis, [  ] Skin Tears, [  ] Other    DIET: Diet, DASH/TLC:   Sodium & Cholesterol Restricted (04-13-22 @ 12:27)                     .  LABS:                         8.5    4.58  )-----------( 219      ( 16 Apr 2022 07:14 )             26.3     04-16    141  |  110<H>  |  20  ----------------------------<  97  4.2   |  24  |  1.60<H>    Ca    9.4      16 Apr 2022 07:14  Phos  3.5     04-15  Mg     2.3     04-15    TPro  5.6<L>  /  Alb  2.9<L>  /  TBili  0.2  /  DBili  x   /  AST  12<L>  /  ALT  16  /  AlkPhos  60  04-15    PT/INR - ( 15 Apr 2022 08:39 )   PT: 13.2 sec;   INR: 1.13 ratio         PTT - ( 15 Apr 2022 08:39 )  PTT:36.2 sec    Culture - Urine (collected 14 Apr 2022 00:48)  Source: Clean Catch Clean Catch (Midstream)  Final Report (15 Apr 2022 10:43):    Culture grew 3 or more types of organisms which indicate    collection contamination; consider recollection only if clinically    indicated.          PTT - ( 13 Apr 2022 08:46 )  PTT:45.8 sec      RADIOLOGY & ADDITIONAL TESTS: NONE      HEALTH ISSUES - PROBLEM Dx:  Hematuria    Afib    GIB (gastrointestinal bleeding)    Anemia    CAD (coronary artery disease)    Acute kidney injury superimposed on CKD    Chronic CHF    HTN (hypertension)    HLD (hyperlipidemia)    DM (diabetes mellitus)    BPH (benign prostatic hyperplasia)    Anxiety and depression    DVT prophylaxis      Consultant(s) Notes Reviewed:  [ x ] YES     Care Discussed with [X] Consultants  [  ] Patient  [ x ] Family [  ] HCP [  ]   [  ] Social Service  [x  ] RN, [  ] Physical Therapy,[  ] Palliative care team  DVT PPX: [  ] Lovenox, [  ] S C Heparin, [  ] Coumadin, [  ] Xarelto, [  ] Eliquis, [  ] Pradaxa, [  ] IV Heparin drip, [ x ] SCD [  ] Contraindication 2 to GI Bleed,[  ] Ambulation [ x ] Contraindicated 2 to  bleed [  ] Contraindicated 2 to Brain Bleed  Advanced directive: [ x ] None, [  ] DNR/DNI

## 2022-04-16 NOTE — PROGRESS NOTE ADULT - PROBLEM SELECTOR PLAN 3
CKD 3-Cr 2.3 on admission, baseline per chart review ~1.9-3,  - Creatinine downtrending 1.6 from 1.8   - Follow BMP  - Renal Dose Meds

## 2022-04-16 NOTE — PROGRESS NOTE ADULT - PROBLEM SELECTOR PLAN 1
Pt presents with x 5 episodes of Gross hematuria at home  -On continue CBI, will stop tody per uro recommendations    -Hold home Eliquis/ASA  -urine cytopathology reveals neutrophils and blood   -Urology Dr. Kiser

## 2022-04-16 NOTE — PROGRESS NOTE ADULT - SUBJECTIVE AND OBJECTIVE BOX
INTERVAL HPI/OVERNIGHT EVENTS:  No new c/o    MEDICATIONS  (STANDING):  amLODIPine   Tablet 10 milliGRAM(s) Oral daily  atorvastatin 10 milliGRAM(s) Oral at bedtime  budesonide  80 MICROgram(s)/formoterol 4.5 MICROgram(s) Inhaler 2 Puff(s) Inhalation two times a day  carvedilol 3.125 milliGRAM(s) Oral every 12 hours  cloNIDine 0.2 milliGRAM(s) Oral two times a day  cyanocobalamin 1000 MICROGram(s) Oral daily  dextrose 5%. 1000 milliLiter(s) (100 mL/Hr) IV Continuous <Continuous>  dextrose 5%. 1000 milliLiter(s) (50 mL/Hr) IV Continuous <Continuous>  dextrose 50% Injectable 25 Gram(s) IV Push once  dextrose 50% Injectable 12.5 Gram(s) IV Push once  dextrose 50% Injectable 25 Gram(s) IV Push once  doxazosin Oral Tab/Cap - Peds 4 milliGRAM(s) Oral two times a day  famotidine    Tablet 40 milliGRAM(s) Oral two times a day  folic acid 1 milliGRAM(s) Oral daily  furosemide    Tablet 40 milliGRAM(s) Oral daily  glucagon  Injectable 1 milliGRAM(s) IntraMuscular once  hydrALAZINE 100 milliGRAM(s) Oral three times a day  insulin lispro (ADMELOG) corrective regimen sliding scale   SubCutaneous three times a day before meals  insulin lispro (ADMELOG) corrective regimen sliding scale   SubCutaneous at bedtime  isosorbide   mononitrate ER Tablet (IMDUR) 30 milliGRAM(s) Oral daily  lactobacillus acidophilus 1 Tablet(s) Oral two times a day  lamoTRIgine 100 milliGRAM(s) Oral daily  pantoprazole    Tablet 40 milliGRAM(s) Oral before breakfast  venlafaxine XR. 150 milliGRAM(s) Oral daily    MEDICATIONS  (PRN):  acetaminophen     Tablet .. 650 milliGRAM(s) Oral every 6 hours PRN Temp greater or equal to 38C (100.4F), Mild Pain (1 - 3)  dextrose Oral Gel 15 Gram(s) Oral once PRN Blood Glucose LESS THAN 70 milliGRAM(s)/deciliter  ondansetron Injectable 4 milliGRAM(s) IV Push every 8 hours PRN Nausea and/or Vomiting  traMADol 50 milliGRAM(s) Oral every 8 hours PRN Moderate Pain (4 - 6)        Vital Signs Last 24 Hrs  T(C): 36.6 (16 Apr 2022 09:04), Max: 36.8 (15 Apr 2022 17:18)  T(F): 97.8 (16 Apr 2022 09:04), Max: 98.3 (15 Apr 2022 17:18)  HR: 65 (16 Apr 2022 09:04) (63 - 77)  BP: 144/81 (16 Apr 2022 09:04) (140/76 - 177/81)  BP(mean): --  RR: 18 (16 Apr 2022 05:13) (17 - 18)  SpO2: 95% (16 Apr 2022 09:04) (93% - 97%)    PHYSICAL EXAM:    ABDOMEN: benign  GENITALIA: mckinnon light pink CBI    LABS:                        8.5    4.58  )-----------( 219      ( 16 Apr 2022 07:14 )             26.3     04-16    141  |  110<H>  |  20  ----------------------------<  97  4.2   |  24  |  1.60<H>    Ca    9.4      16 Apr 2022 07:14  Phos  3.5     04-15  Mg     2.3     04-15    TPro  5.6<L>  /  Alb  2.9<L>  /  TBili  0.2  /  DBili  x   /  AST  12<L>  /  ALT  16  /  AlkPhos  60  04-15    PT/INR - ( 15 Apr 2022 08:39 )   PT: 13.2 sec;   INR: 1.13 ratio         PTT - ( 15 Apr 2022 08:39 )  PTT:36.2 sec    Urine culture:  04-14 @ 00:48 --   Culture grew 3 or more types of organisms which indicate  collection contamination; consider recollection only if clinically  indicated.    Blood Cultures:  04-14 @ 00:48   Culture grew 3 or more types of organisms which indicate  collection contamination; consider recollection only if clinically  indicated.  --  --    RADIOLOGY & ADDITIONAL TESTS:

## 2022-04-16 NOTE — PROGRESS NOTE ADULT - PROBLEM SELECTOR PLAN 8
Diabetes type II   Hold oral hypoglycemic meds  Insulin Corrective Scale  Finger sticks per routine  Consistent Carb Diet  Hypoglycemia protocol

## 2022-04-16 NOTE — PROGRESS NOTE ADULT - PROBLEM SELECTOR PLAN 7
Chronic, history of stents  - Continue Coreg,  Atorvastatin and Imdur   - Hold Eliquis and ASA in setting of hematuria  - Cardio DR. Bliss's group following

## 2022-04-16 NOTE — PROGRESS NOTE ADULT - SUBJECTIVE AND OBJECTIVE BOX
[facility] -- Fifi Bliss, Kalpesh Valdovinos, Abraham Sheridan Savella, Goodger: Office # [phone]    Follow Up:  Hx of Afib on Eliquis, now with hematuria; CAD s/p stents    Subjective/Observations:  Patient seen and examined. Patient awake, alert, resting in bed. No complaints of chest pain, dyspnea, palpitations or dizziness. No signs of orthopnea or PND.     REVIEW OF SYSTEMS: All review of systems is negative for eye, ENT, GI, , allergic, dermatologic, musculoskeletal and neurologic except as described above    PAST MEDICAL & SURGICAL HISTORY:  HTN (hypertension)  c/b multiple episodes of hypertensive urgency    HLD (hyperlipidemia)    Atrial fibrillation  first documented on EKG 10/7/2021    CAD (coronary artery disease)  s/p stents (not on plavix)    Type 2 diabetes mellitus  not on home insulin/ Meds    Anxiety  Depression  multiple psych medications    History of diverticulitis  07/2021    Diverticulosis  c/b GIB in 2020    Afib  on AC    Stage 3 chronic kidney disease    Anemia of chronic disease  Monoclonal Gammopathy-MGUS  pRBC transfusion 10/15/21    Chronic diastolic congestive heart failure    Multiple thyroid nodules    H/O: upper GI bleed  4/22, small Bowel Bleed, 5 u pRBC Transfusion 4/22  S/P Colonoscopy- Diverticulosis  S/P Capsule Endoscopy done -[facility] -No active bleeding    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  Had surgery ( April 2013 )    S/P tonsillectomy    S/P arthroscopic knee surgery  Bilateral ( 2005 )    Torsion of testicle  Had surgery at age 14    Pilonidal cyst  Had surgery ( 1969 )    S/P cataract surgery  Bilateral    H/O hernia repair        MEDICATIONS  (STANDING):  amLODIPine   Tablet 10 milliGRAM(s) Oral daily  atorvastatin 10 milliGRAM(s) Oral at bedtime  budesonide  80 MICROgram(s)/formoterol 4.5 MICROgram(s) Inhaler 2 Puff(s) Inhalation two times a day  carvedilol 3.125 milliGRAM(s) Oral every 12 hours  cloNIDine 0.2 milliGRAM(s) Oral two times a day  cyanocobalamin 1000 MICROGram(s) Oral daily  dextrose 5%. 1000 milliLiter(s) (100 mL/Hr) IV Continuous <Continuous>  dextrose 5%. 1000 milliLiter(s) (50 mL/Hr) IV Continuous <Continuous>  dextrose 50% Injectable 25 Gram(s) IV Push once  dextrose 50% Injectable 12.5 Gram(s) IV Push once  dextrose 50% Injectable 25 Gram(s) IV Push once  doxazosin Oral Tab/Cap - Peds 4 milliGRAM(s) Oral two times a day  famotidine    Tablet 40 milliGRAM(s) Oral two times a day  folic acid 1 milliGRAM(s) Oral daily  furosemide    Tablet 40 milliGRAM(s) Oral daily  glucagon  Injectable 1 milliGRAM(s) IntraMuscular once  hydrALAZINE 100 milliGRAM(s) Oral three times a day  insulin lispro (ADMELOG) corrective regimen sliding scale   SubCutaneous three times a day before meals  insulin lispro (ADMELOG) corrective regimen sliding scale   SubCutaneous at bedtime  isosorbide   mononitrate ER Tablet (IMDUR) 30 milliGRAM(s) Oral daily  lactobacillus acidophilus 1 Tablet(s) Oral two times a day  lamoTRIgine 100 milliGRAM(s) Oral daily  pantoprazole    Tablet 40 milliGRAM(s) Oral before breakfast  venlafaxine XR. 150 milliGRAM(s) Oral daily    MEDICATIONS  (PRN):  acetaminophen     Tablet .. 650 milliGRAM(s) Oral every 6 hours PRN Temp greater or equal to 38C (100.4F), Mild Pain (1 - 3)  dextrose Oral Gel 15 Gram(s) Oral once PRN Blood Glucose LESS THAN 70 milliGRAM(s)/deciliter  ondansetron Injectable 4 milliGRAM(s) IV Push every 8 hours PRN Nausea and/or Vomiting  traMADol 50 milliGRAM(s) Oral every 8 hours PRN Moderate Pain (4 - 6)    Allergies    No Known Allergies    Intolerances      Vital Signs Last 24 Hrs  T(C): 37.1 (16 Apr 2022 13:08), Max: 37.1 (16 Apr 2022 13:08)  T(F): 98.7 (16 Apr 2022 13:08), Max: 98.7 (16 Apr 2022 13:08)  HR: 64 (16 Apr 2022 13:08) (63 - 77)  BP: 153/82 (16 Apr 2022 13:08) (144/81 - 177/81)  BP(mean): --  RR: 16 (16 Apr 2022 13:08) (16 - 18)  SpO2: 94% (16 Apr 2022 13:08) (93% - 97%)  I&O's Summary    15 Apr 2022 07:01  -  16 Apr 2022 07:00  --------------------------------------------------------  IN: 0 mL / OUT: 4000 mL / NET: -4000 mL          TELE: Not on telemetry   PHYSICAL EXAM:  Appearance: NAD, no distress, alert, Well developed   HEENT: Moist Mucous Membranes, Anicteric  Cardiovascular: Regular rate and rhythm, Normal S1 S2, No JVD, No murmurs, No rubs, gallops or clicks  Respiratory: Non-labored, Clear to auscultation, No rales, No rhonchi, No wheezing.   Gastrointestinal:  Soft, Non-tender, + BS  : + El with CBI  Neurologic: Non-focal  Skin: Warm and dry, No visible rashes or ulcers, No ecchymosis, No cyanosis  Musculoskeletal: No clubbing, No cyanosis, No joint swelling/tenderness  Psychiatry: Mood & affect appropriate  Lymph: No peripheral edema.     LABS: All Labs Reviewed:                        8.5    4.58  )-----------( 219      ( 16 Apr 2022 07:14 )             26.3                         8.1    5.07  )-----------( 211      ( 15 Apr 2022 08:39 )             24.4                         8.5    x     )-----------( x        ( 14 Apr 2022 17:48 )             25.4     16 Apr 2022 07:14    141    |  110    |  20     ----------------------------<  97     4.2     |  24     |  1.60   15 Apr 2022 08:39    141    |  108    |  22     ----------------------------<  122    3.3     |  26     |  1.80   14 Apr 2022 08:28    140    |  110    |  29     ----------------------------<  112    3.5     |  20     |  2.00     Ca    9.4        16 Apr 2022 07:14  Ca    9.1        15 Apr 2022 08:39  Ca    8.6        14 Apr 2022 08:28  Phos  3.5       15 Apr 2022 08:39  Mg     2.3       15 Apr 2022 08:39    TPro  5.6    /  Alb  2.9    /  TBili  0.2    /  DBili  x      /  AST  12     /  ALT  16     /  AlkPhos  60     15 Apr 2022 08:39   LIVER FUNCTIONS - ( 15 Apr 2022 08:39 )  Alb: 2.9 g/dL / Pro: 5.6 g/dL / ALK PHOS: 60 U/L / ALT: 16 U/L / AST: 12 U/L / GGT: x           PT/INR - ( 15 Apr 2022 08:39 )   PT: 13.2 sec;   INR: 1.13 ratio         PTT - ( 15 Apr 2022 08:39 )  PTT:36.2 sec  12 Lead ECG:   Ventricular Rate 52 BPM    Atrial Rate 52 BPM    P-R Interval 348 ms    QRS Duration 116 ms    Q-T Interval 496 ms    QTC Calculation(Bazett) 461 ms    P Axis 49 degrees    R Axis -11 degrees    T Axis 179 degrees    Diagnosis Line Sinus bradycardia with 1st degree AV block  Left ventricular hypertrophy with QRS widening and repolarization abnormality ( R in aVL , Kain product )  Abnormal ECG  When compared with ECG of 30-MAR-2022 13:01,  No significant change was found  Confirmed by Bonifacio BARRERA, Victorino ([id number]) on 4/13/2022 10:48:39 AM (04-13-22 @ 09:45)       EXAM:  ECHO TTE WO CON COMP W DOPP         PROCEDURE DATE:  01/06/2022        INTERPRETATION:  INDICATION: Dyspnea  Sonographer PH    Blood Pressure 177/86    Height 172.7 cm     Weight 104.4 kg    Dimensions:  LA 4.2       Normal Values: 2.0 - 4.0 cm  Ao 3.7        Normal Values: 2.0 - 3.8 cm  SEPTUM 1.4       Normal Values: 0.6 - 1.2 cm  PWT 1.4       Normal Values: 0.6 - 1.1 cm  LVIDd 5.2         Normal Values: 3.0 - 5.6 cm  LVIDs 4.2         Normal Values: 1.8 - 4.0 cm      OBSERVATIONS:  Technically difficult study  Mitral Valve: Mitral annular calcification with thickened leaflets, mild   MR.  Aortic Valve/Aorta: Calcified trileaflet aortic valve with decreased   opening. Peak transaortic valve gradient is 29.4 mmHg with a mean   transaortic valve gradient 14.7 mmHg. This consistent with mild aortic   stenosis.  Tricuspid Valve: Mild TR.  Pulmonic Valve: Not well-visualized  Left Atrium: Enlarged  Right Atrium: Not well-visualized  Left Ventricle: Moderate left ventricular hypertrophy with normal   systolic function, estimated LVEF of 55%.  Right Ventricle: Grossly normal size and systolic function.  Pericardium: no significant pericardial effusion.  Pulmonary/RV Pressure: estimated PA systolic pressure of 32mmHg        IMPRESSION:  Technically difficult study  Moderate left ventricular hypertrophy with normal systolic function,   estimated LVEF of 55%.  Grossly normal RV size and systolic function.  Calcified trileaflet aortic valve with mild aortic stenosis  Mild MR andTR.  No significant pericardial effusion.    --- End of Report ---              VIET GREENWOOD MD; Attending Cardiologist  This document has been electronically signed. Jan 7 2022 11:19AM

## 2022-04-16 NOTE — PROGRESS NOTE ADULT - PROBLEM SELECTOR PLAN 6
Chronic, HR stable   Continue Coreg  w/ hold parameters   Holding Eliquis in setting of hematuria   EKG: sinus bradycardia with 1st degree AV block, LVH with QRS widening and repolarization abnormality  Cardio DR. Bliss's group following

## 2022-04-16 NOTE — PROGRESS NOTE ADULT - PROBLEM SELECTOR PLAN 2
-Acute on Chronic Anemia , Recent Upper GI Bleed , Now with Hematuria  - Hb stable this AM 8.5 from 8.1  - recent GI Bleed -small bowel- Jejunum,   pt had Colonoscopy -Diverticulosis & Capsule Endoscopy at [facility] last week -NO active GI bleed.   - Transfused for Hb less than 8  - daily CBC  - Holding home Eliquis & ASA

## 2022-04-16 NOTE — PROGRESS NOTE ADULT - PROBLEM SELECTOR PLAN 9
Pt recently admitted to [facility] for GIB,  got 5 u pRBC in total at [facility]   -Pt was transferred to [facility] for Capsule endoscopy  and d/c'd 4/11  -Denies GIB since hospitalization  -Started Pantoprazole qd  -Continue home Pepcid  -Continue to monitor for signs/symptoms of GIB

## 2022-04-17 LAB
ANION GAP SERPL CALC-SCNC: 6 MMOL/L — SIGNIFICANT CHANGE UP (ref 5–17)
BASOPHILS # BLD AUTO: 0.02 K/UL — SIGNIFICANT CHANGE UP (ref 0–0.2)
BASOPHILS NFR BLD AUTO: 0.3 % — SIGNIFICANT CHANGE UP (ref 0–2)
BUN SERPL-MCNC: 22 MG/DL — SIGNIFICANT CHANGE UP (ref 7–23)
CALCIUM SERPL-MCNC: 9.5 MG/DL — SIGNIFICANT CHANGE UP (ref 8.5–10.1)
CHLORIDE SERPL-SCNC: 108 MMOL/L — SIGNIFICANT CHANGE UP (ref 96–108)
CO2 SERPL-SCNC: 28 MMOL/L — SIGNIFICANT CHANGE UP (ref 22–31)
CREAT SERPL-MCNC: 1.8 MG/DL — HIGH (ref 0.5–1.3)
EGFR: 39 ML/MIN/1.73M2 — LOW
EOSINOPHIL # BLD AUTO: 0.18 K/UL — SIGNIFICANT CHANGE UP (ref 0–0.5)
EOSINOPHIL NFR BLD AUTO: 3.1 % — SIGNIFICANT CHANGE UP (ref 0–6)
GLUCOSE SERPL-MCNC: 107 MG/DL — HIGH (ref 70–99)
HCT VFR BLD CALC: 25.8 % — LOW (ref 39–50)
HGB BLD-MCNC: 8.4 G/DL — LOW (ref 13–17)
IMM GRANULOCYTES NFR BLD AUTO: 0.3 % — SIGNIFICANT CHANGE UP (ref 0–1.5)
LYMPHOCYTES # BLD AUTO: 1.11 K/UL — SIGNIFICANT CHANGE UP (ref 1–3.3)
LYMPHOCYTES # BLD AUTO: 19.1 % — SIGNIFICANT CHANGE UP (ref 13–44)
MCHC RBC-ENTMCNC: 29.2 PG — SIGNIFICANT CHANGE UP (ref 27–34)
MCHC RBC-ENTMCNC: 32.6 GM/DL — SIGNIFICANT CHANGE UP (ref 32–36)
MCV RBC AUTO: 89.6 FL — SIGNIFICANT CHANGE UP (ref 80–100)
MONOCYTES # BLD AUTO: 0.45 K/UL — SIGNIFICANT CHANGE UP (ref 0–0.9)
MONOCYTES NFR BLD AUTO: 7.7 % — SIGNIFICANT CHANGE UP (ref 2–14)
NEUTROPHILS # BLD AUTO: 4.03 K/UL — SIGNIFICANT CHANGE UP (ref 1.8–7.4)
NEUTROPHILS NFR BLD AUTO: 69.5 % — SIGNIFICANT CHANGE UP (ref 43–77)
NRBC # BLD: 0 /100 WBCS — SIGNIFICANT CHANGE UP (ref 0–0)
PLATELET # BLD AUTO: 241 K/UL — SIGNIFICANT CHANGE UP (ref 150–400)
POTASSIUM SERPL-MCNC: 4 MMOL/L — SIGNIFICANT CHANGE UP (ref 3.5–5.3)
POTASSIUM SERPL-SCNC: 4 MMOL/L — SIGNIFICANT CHANGE UP (ref 3.5–5.3)
RBC # BLD: 2.88 M/UL — LOW (ref 4.2–5.8)
RBC # FLD: 16 % — HIGH (ref 10.3–14.5)
SODIUM SERPL-SCNC: 142 MMOL/L — SIGNIFICANT CHANGE UP (ref 135–145)
WBC # BLD: 5.81 K/UL — SIGNIFICANT CHANGE UP (ref 3.8–10.5)
WBC # FLD AUTO: 5.81 K/UL — SIGNIFICANT CHANGE UP (ref 3.8–10.5)

## 2022-04-17 PROCEDURE — 99232 SBSQ HOSP IP/OBS MODERATE 35: CPT

## 2022-04-17 RX ORDER — POLYETHYLENE GLYCOL 3350 17 G/17G
17 POWDER, FOR SOLUTION ORAL DAILY
Refills: 0 | Status: DISCONTINUED | OUTPATIENT
Start: 2022-04-17 | End: 2022-04-19

## 2022-04-17 RX ORDER — SENNA PLUS 8.6 MG/1
2 TABLET ORAL AT BEDTIME
Refills: 0 | Status: DISCONTINUED | OUTPATIENT
Start: 2022-04-17 | End: 2022-04-19

## 2022-04-17 RX ADMIN — CARVEDILOL PHOSPHATE 3.12 MILLIGRAM(S): 80 CAPSULE, EXTENDED RELEASE ORAL at 17:56

## 2022-04-17 RX ADMIN — LAMOTRIGINE 100 MILLIGRAM(S): 25 TABLET, ORALLY DISINTEGRATING ORAL at 11:58

## 2022-04-17 RX ADMIN — AMLODIPINE BESYLATE 10 MILLIGRAM(S): 2.5 TABLET ORAL at 06:22

## 2022-04-17 RX ADMIN — Medication 1 TABLET(S): at 06:23

## 2022-04-17 RX ADMIN — CARVEDILOL PHOSPHATE 3.12 MILLIGRAM(S): 80 CAPSULE, EXTENDED RELEASE ORAL at 06:41

## 2022-04-17 RX ADMIN — PANTOPRAZOLE SODIUM 40 MILLIGRAM(S): 20 TABLET, DELAYED RELEASE ORAL at 06:23

## 2022-04-17 RX ADMIN — ATORVASTATIN CALCIUM 10 MILLIGRAM(S): 80 TABLET, FILM COATED ORAL at 22:08

## 2022-04-17 RX ADMIN — Medication 40 MILLIGRAM(S): at 06:23

## 2022-04-17 RX ADMIN — Medication 150 MILLIGRAM(S): at 11:58

## 2022-04-17 RX ADMIN — PREGABALIN 1000 MICROGRAM(S): 225 CAPSULE ORAL at 11:59

## 2022-04-17 RX ADMIN — FAMOTIDINE 40 MILLIGRAM(S): 10 INJECTION INTRAVENOUS at 17:56

## 2022-04-17 RX ADMIN — Medication 4 MILLIGRAM(S): at 17:56

## 2022-04-17 RX ADMIN — Medication 1 TABLET(S): at 17:56

## 2022-04-17 RX ADMIN — Medication 0.2 MILLIGRAM(S): at 06:23

## 2022-04-17 RX ADMIN — Medication 100 MILLIGRAM(S): at 13:51

## 2022-04-17 RX ADMIN — Medication 4 MILLIGRAM(S): at 06:26

## 2022-04-17 RX ADMIN — ISOSORBIDE MONONITRATE 30 MILLIGRAM(S): 60 TABLET, EXTENDED RELEASE ORAL at 11:59

## 2022-04-17 RX ADMIN — Medication 650 MILLIGRAM(S): at 19:54

## 2022-04-17 RX ADMIN — Medication 2: at 11:58

## 2022-04-17 RX ADMIN — FAMOTIDINE 40 MILLIGRAM(S): 10 INJECTION INTRAVENOUS at 06:24

## 2022-04-17 RX ADMIN — Medication 100 MILLIGRAM(S): at 22:08

## 2022-04-17 RX ADMIN — Medication 650 MILLIGRAM(S): at 22:10

## 2022-04-17 RX ADMIN — Medication 100 MILLIGRAM(S): at 06:23

## 2022-04-17 RX ADMIN — BUDESONIDE AND FORMOTEROL FUMARATE DIHYDRATE 2 PUFF(S): 160; 4.5 AEROSOL RESPIRATORY (INHALATION) at 06:24

## 2022-04-17 RX ADMIN — Medication 1: at 17:55

## 2022-04-17 RX ADMIN — Medication 1 MILLIGRAM(S): at 11:59

## 2022-04-17 RX ADMIN — Medication 0.2 MILLIGRAM(S): at 17:56

## 2022-04-17 NOTE — PROGRESS NOTE ADULT - PROBLEM SELECTOR PLAN 6
Chronic, HR stable   Continue Coreg  w/ hold parameters   Holding Eliquis in setting of hematuria   EKG: sinus bradycardia with 1st degree AV block, LVH with QRS widening and repolarization abnormality  Cardio DR. Bliss's group following Chronic, HR stable   Continue Coreg  2x day  Holding Eliquis /ASA in setting of hematuria   EKG: sinus bradycardia with 1st degree AV block, LVH with QRS widening and repolarization abnormality  Cardio DR. Bliss's group following

## 2022-04-17 NOTE — PROGRESS NOTE ADULT - SUBJECTIVE AND OBJECTIVE BOX
INTERVAL HPI/OVERNIGHT EVENTS:  No new c/o    MEDICATIONS  (STANDING):  amLODIPine   Tablet 10 milliGRAM(s) Oral daily  atorvastatin 10 milliGRAM(s) Oral at bedtime  budesonide  80 MICROgram(s)/formoterol 4.5 MICROgram(s) Inhaler 2 Puff(s) Inhalation two times a day  carvedilol 3.125 milliGRAM(s) Oral every 12 hours  cloNIDine 0.2 milliGRAM(s) Oral two times a day  cyanocobalamin 1000 MICROGram(s) Oral daily  dextrose 5%. 1000 milliLiter(s) (100 mL/Hr) IV Continuous <Continuous>  dextrose 5%. 1000 milliLiter(s) (50 mL/Hr) IV Continuous <Continuous>  dextrose 50% Injectable 25 Gram(s) IV Push once  dextrose 50% Injectable 12.5 Gram(s) IV Push once  dextrose 50% Injectable 25 Gram(s) IV Push once  doxazosin Oral Tab/Cap - Peds 4 milliGRAM(s) Oral two times a day  famotidine    Tablet 40 milliGRAM(s) Oral two times a day  folic acid 1 milliGRAM(s) Oral daily  furosemide    Tablet 40 milliGRAM(s) Oral daily  glucagon  Injectable 1 milliGRAM(s) IntraMuscular once  hydrALAZINE 100 milliGRAM(s) Oral three times a day  insulin lispro (ADMELOG) corrective regimen sliding scale   SubCutaneous three times a day before meals  insulin lispro (ADMELOG) corrective regimen sliding scale   SubCutaneous at bedtime  isosorbide   mononitrate ER Tablet (IMDUR) 30 milliGRAM(s) Oral daily  lactobacillus acidophilus 1 Tablet(s) Oral two times a day  lamoTRIgine 100 milliGRAM(s) Oral daily  pantoprazole    Tablet 40 milliGRAM(s) Oral before breakfast  venlafaxine XR. 150 milliGRAM(s) Oral daily    MEDICATIONS  (PRN):  acetaminophen     Tablet .. 650 milliGRAM(s) Oral every 6 hours PRN Temp greater or equal to 38C (100.4F), Mild Pain (1 - 3)  bisacodyl Suppository 10 milliGRAM(s) Rectal daily PRN Constipation  dextrose Oral Gel 15 Gram(s) Oral once PRN Blood Glucose LESS THAN 70 milliGRAM(s)/deciliter  ondansetron Injectable 4 milliGRAM(s) IV Push every 8 hours PRN Nausea and/or Vomiting  traMADol 50 milliGRAM(s) Oral every 8 hours PRN Moderate Pain (4 - 6)        Vital Signs Last 24 Hrs  T(C): 36.9 (17 Apr 2022 05:23), Max: 37.1 (16 Apr 2022 13:08)  T(F): 98.5 (17 Apr 2022 05:23), Max: 98.7 (16 Apr 2022 13:08)  HR: 66 (17 Apr 2022 05:23) (64 - 79)  BP: 157/76 (17 Apr 2022 05:23) (144/81 - 157/76)  BP(mean): --  RR: 18 (17 Apr 2022 05:23) (16 - 18)  SpO2: 95% (17 Apr 2022 05:23) (94% - 95%)    PHYSICAL EXAM:    mckinnon - cl CBI    LABS:                        8.5    4.58  )-----------( 219      ( 16 Apr 2022 07:14 )             26.3     04-16    141  |  110<H>  |  20  ----------------------------<  97  4.2   |  24  |  1.60<H>    Ca    9.4      16 Apr 2022 07:14  Phos  3.5     04-15  Mg     2.3     04-15    TPro  5.6<L>  /  Alb  2.9<L>  /  TBili  0.2  /  DBili  x   /  AST  12<L>  /  ALT  16  /  AlkPhos  60  04-15    PT/INR - ( 15 Apr 2022 08:39 )   PT: 13.2 sec;   INR: 1.13 ratio         PTT - ( 15 Apr 2022 08:39 )  PTT:36.2 sec    Urine culture:  04-14 @ 00:48 --   Culture grew 3 or more types of organisms which indicate  collection contamination; consider recollection only if clinically  indicated.    Blood Cultures:  04-14 @ 00:48   Culture grew 3 or more types of organisms which indicate  collection contamination; consider recollection only if clinically  indicated.  --  --    RADIOLOGY & ADDITIONAL TESTS:

## 2022-04-17 NOTE — PROGRESS NOTE ADULT - PROBLEM SELECTOR PLAN 4
Chronic, elevated SBP this   Continue home Clonidine, Hydralazine, Coreg, Amlodipine and  Lasix  with hold parameters  Hemodynamically stable   Dash Diet Chronic, elevated SBP this   Continue home Clonidine, Hydralazine, Coreg, Amlodipine and  Lasix  daily   Dash Diet

## 2022-04-17 NOTE — PROGRESS NOTE ADULT - SUBJECTIVE AND OBJECTIVE BOX
Patient is a 77y old  Male who presents with a chief complaint of Hematuria (17 Apr 2022 07:24)    HPI:  76yo M with PMH of Afib (on eliquis), MGUS, depression, CAD s/p stents, HLD, HTN, CKD, DM2, diverticulosis, Anemia , Diastolic  CHF (Echo 1/22 LVEF 55%) and BPH with recent GI Bleed 4/22 with multiple pRBC transfusions recently d/chio from [facility] 4/11/22 presents to the ED with hematuria. Patient states he went to void at 3AM and noticed it was difficult for him to urinate at first, but when he was able to void, he saw a lot of mikayla blood. Patient had 5 episodes of mikayla blood without clots along with dysuria. Denies trauma, falls, penile lesion. Per chart review, patient was admitted to [facility] on 3/30/22 for a rectal bleed and was transfused 4-5 units of pRBC and transferred to [facility] for further workup after a bleeding scan showed a jejunal bleed. During this time, Eliquis was held. Capsule endoscopy revealed possible small bleeding site. Pt's Eliquis was resumed on 4/8 with no reoccurrence of a GIB. Pt last took Eliquis this AM.     Admits to HA, dizziness, nausea, decreased PO intake. Denies fever/chills, chest pain, palpitations, vomiting, constipation, diarrhea, hematochezia.     ED course:   Vitals:  BP:106/71      HR: 61     Temp: 97.2      RR:18     O2: 96%  Labs significant for: H/H 9.4/28.1, PT 21.7, INR 1.84, PTT 45.8, BUN/Cr 31/2.3, glucose 116  EKG: sinus bradycardia with 1st degree AV block, LVH with QRS widening and repolarization abnormality (13 Apr 2022 11:54)    INTERVAL HPI:  4/14/22: Pt seen and examined at bedside. Complaining of 9/10 lower abdominal pain, nausea, as well as lots of mikayla blood loss from the penis. Bladder scan revealed >400cc of urine, pt attempted to use the bathroom but was actively bleeding. Urology Dr. Kiser spoke to the surgical PA, irrigated bladder and removed a copious amount of clots. CBI was ordered. Pt also complaining of b/l knee pain & headache. Denies fever/chills, chest pain, palpitations, vomiting/ constipation/ diarrhea/ hematochezia. Hgb dropped from 9.4 to 8.4, but otherwise hemodynamically stable.   04/15/22 Patient seen and examined at bedside. Patient states mild discomfort in pelvic area.  Hb dropped from 8.4 to 8.1 this AM , Low stable H/H   04/16/22 Patient seen and examined at bedside. Patient states that did not sleep well last night due to CBI leak. Today will stop CBI per uro recommendation. H/H low stable, mild pink urine in mckinnon   04/17/22 Patient seen and examined at bedside. Patient states that he has no complaints at this time. Denies chest pain, abdominal pain, n/v/c/d, fevers/chills. CBI titrated as needed until hematuria resolves. Draining light pink urine. H/H low but stable at 8.4.    OVERNIGHT EVENTS:  None    Home Medications:  acetaminophen 325 mg oral tablet: 2 tab(s) orally every 6 hours, As needed, Temp greater or equal to 38C (100.4F), Mild Pain (1 - 3) (14 Apr 2022 16:23)  aspirin 81 mg oral tablet: 1 tab(s) orally once a day (13 Apr 2022 15:41)  budesonide-formoterol 80 mcg-4.5 mcg/inh inhalation aerosol: 2 puff(s) inhaled 2 times a day (13 Apr 2022 15:41)  cloNIDine 0.2 mg oral tablet: 1 tab(s) orally 2 times a day (13 Apr 2022 15:41)  diclofenac 1% topical gel: Apply topically to affected area 3 times a day (13 Apr 2022 15:41)  doxazosin 4 mg oral tablet: 1 tab(s) orally 2 times a day (13 Apr 2022 15:41)  famotidine 40 mg oral tablet: 1 tab(s) orally 2 times a day (13 Apr 2022 15:41)  isosorbide mononitrate 30 mg oral tablet, extended release: 1 tab(s) orally once a day (in the morning) (13 Apr 2022 15:41)  lactobacillus acidophilus oral capsule: 1 cap(s) orally 2 times a day (13 Apr 2022 15:41)  lamoTRIgine 100 mg oral tablet: 1 tab(s) orally once a day (13 Apr 2022 15:41)  metFORMIN 500 mg oral tablet: 1 tab(s) orally 2 times a day (13 Apr 2022 15:41)  oxybutynin 15 mg/24 hr oral tablet, extended release: 1 tab(s) orally once a day (13 Apr 2022 15:41)  potassium chloride 20 mEq oral tablet, extended release: 1 tab(s) orally once a day (13 Apr 2022 15:41)  Vitamin B-12 1000 mcg oral tablet: 1 tab(s) orally once a day (13 Apr 2022 15:41)      MEDICATIONS  (STANDING):  amLODIPine   Tablet 10 milliGRAM(s) Oral daily  atorvastatin 10 milliGRAM(s) Oral at bedtime  budesonide  80 MICROgram(s)/formoterol 4.5 MICROgram(s) Inhaler 2 Puff(s) Inhalation two times a day  carvedilol 3.125 milliGRAM(s) Oral every 12 hours  cloNIDine 0.2 milliGRAM(s) Oral two times a day  cyanocobalamin 1000 MICROGram(s) Oral daily  dextrose 5%. 1000 milliLiter(s) (100 mL/Hr) IV Continuous <Continuous>  dextrose 5%. 1000 milliLiter(s) (50 mL/Hr) IV Continuous <Continuous>  dextrose 50% Injectable 25 Gram(s) IV Push once  dextrose 50% Injectable 12.5 Gram(s) IV Push once  dextrose 50% Injectable 25 Gram(s) IV Push once  doxazosin Oral Tab/Cap - Peds 4 milliGRAM(s) Oral two times a day  famotidine    Tablet 40 milliGRAM(s) Oral two times a day  folic acid 1 milliGRAM(s) Oral daily  furosemide    Tablet 40 milliGRAM(s) Oral daily  glucagon  Injectable 1 milliGRAM(s) IntraMuscular once  hydrALAZINE 100 milliGRAM(s) Oral three times a day  insulin lispro (ADMELOG) corrective regimen sliding scale   SubCutaneous three times a day before meals  insulin lispro (ADMELOG) corrective regimen sliding scale   SubCutaneous at bedtime  isosorbide   mononitrate ER Tablet (IMDUR) 30 milliGRAM(s) Oral daily  lactobacillus acidophilus 1 Tablet(s) Oral two times a day  lamoTRIgine 100 milliGRAM(s) Oral daily  pantoprazole    Tablet 40 milliGRAM(s) Oral before breakfast  venlafaxine XR. 150 milliGRAM(s) Oral daily    MEDICATIONS  (PRN):  acetaminophen     Tablet .. 650 milliGRAM(s) Oral every 6 hours PRN Temp greater or equal to 38C (100.4F), Mild Pain (1 - 3)  bisacodyl Suppository 10 milliGRAM(s) Rectal daily PRN Constipation  dextrose Oral Gel 15 Gram(s) Oral once PRN Blood Glucose LESS THAN 70 milliGRAM(s)/deciliter  ondansetron Injectable 4 milliGRAM(s) IV Push every 8 hours PRN Nausea and/or Vomiting  traMADol 50 milliGRAM(s) Oral every 8 hours PRN Moderate Pain (4 - 6)      Allergies    No Known Allergies    Intolerances        Social History:  Tobacco: quit 45 years ago, former 10 pack years smoker   EtOH: denies  recreational drug use: denies  Lives with: alone  Ambulates:  independent  ADLs: independent  Occupation: retired  Vaccinations: Moderna x2 (13 Apr 2022 11:54)      REVIEW OF SYSTEMS:  CONSTITUTIONAL: No fever, No chills, No fatigue, No myalgia, No Body ache, No Weakness  EYES: No eye pain,  No visual disturbances, No discharge, NO Redness  ENMT:  No ear pain, No nose bleed, No vertigo; No sinus pain, NO throat pain, No Congestion  NECK: No pain, No stiffness  RESPIRATORY: No cough, NO wheezing, No  hemoptysis, NO  shortness of breath  CARDIOVASCULAR: No chest pain, palpitations  GASTROINTESTINAL: No abdominal pain, NO epigastric pain. No nausea, No vomiting; No diarrhea, No constipation. [x  ] BM  GENITOURINARY: Admits to hematuria, mckinnon, No dysuria, No frequency, No urgency, NO incontinence  NEUROLOGICAL: No headaches, No dizziness, No numbness, No tingling, No tremors, No weakness  EXT: No Swelling, No Pain, No Edema  SKIN:  [x] No itching, burning, rashes, or lesions   MUSCULOSKELETAL: No joint pain ,No Jt swelling; No muscle pain, No back pain, No extremity pain  PSYCHIATRIC: No depression,  No anxiety,  No mood swings ,No difficulty sleeping at night  PAIN SCALE: [ x ] None  [  ] Other-  ROS Unable to obtain due to - [  ] Dementia  [  ] Lethargy [  ] Drowsy [  ] Sedated [  ] non verbal  REST OF REVIEW Of SYSTEM - [ x ] Normal     Vital Signs Last 24 Hrs  T(C): 36.9 (17 Apr 2022 05:23), Max: 37.1 (16 Apr 2022 13:08)  T(F): 98.5 (17 Apr 2022 05:23), Max: 98.7 (16 Apr 2022 13:08)  HR: 66 (17 Apr 2022 05:23) (64 - 79)  BP: 157/76 (17 Apr 2022 05:23) (150/70 - 157/76)  BP(mean): --  RR: 18 (17 Apr 2022 05:23) (16 - 18)  SpO2: 95% (17 Apr 2022 05:23) (94% - 95%)  Finger Stick        04-16 @ 07:01  -  04-17 @ 07:00  --------------------------------------------------------  IN: 720 mL / OUT: 06184 mL / NET: -54913 mL        PHYSICAL EXAM:  GENERAL:  [ x ] NAD , [x  ] well appearing, [  ] Agitated, [  ] Drowsy,  [  ] Lethargy, [  ] confused   HEAD:  [ x ] Normal, [  ] Other  EYES:  [  ] EOMI, [  ] PERRLA, [  x] conjunctiva and sclera clear normal, [  ] Other,  [  ] Pallor,[  ] Discharge  ENMT:  [ x ] Normal, [x  ] Moist mucous membranes, [  ] Good dentition, [ x ] No Thrush  NECK:  [x  ] Supple, [x  ] No JVD, [ x ] Normal thyroid, [  ] Lymphadenopathy [  ] Other  CHEST/LUNG:  [ x ] Clear to auscultation bilaterally, [ x ] Breath Sounds equal B/L , [  ] poor effort  [ x ] No rales, [x  ] No rhonchi  [ x ]  No wheezing,   HEART:  [ x ] Regular rate and rhythm, [  ] tachycardia, [  ] Bradycardia,  [  ] irregular  [ x ] No murmurs, No rubs, No gallops, [ x ] PPM in place (Mfr:  )  ABDOMEN: Mckinnon in place [ x ] Soft, [  ] Nontender-slightly tender to palpation , [ x ] Nondistended, [ x ] No mass, [ x ] Bowel sounds present, [  ] obese  NERVOUS SYSTEM:  [x  ] Alert & Oriented X3, [ x ] Nonfocal  [  ] Confusion  [  ] Encephalopathic [  ] Sedated [  ] Unable to assess, [  ] Dementia [  ] Other-  EXTREMITIES: [x  ] 2+ Peripheral Pulses, No clubbing, No cyanosis,  [  ] edema B/L lower EXT. [  ] PVD stasis skin changes B/L Lower EXT, [  ] wound  LYMPH: No lymphadenopathy noted  SKIN:  [x  ] No rashes or lesions, [  ] Pressure Ulcers, [  ] ecchymosis, [  ] Skin Tears, [  ] Other    DIET: Diet, DASH/TLC:   Sodium & Cholesterol Restricted (04-13-22 @ 12:27)      LABS:                        8.4    5.81  )-----------( 241      ( 17 Apr 2022 09:13 )             25.8     17 Apr 2022 09:13    142    |  108    |  22     ----------------------------<  107    4.0     |  28     |  1.80     Ca    9.5        17 Apr 2022 09:13            Culture Results:   Culture grew 3 or more types of organisms which indicate  collection contamination; consider recollection only if clinically  indicated. (04-14 @ 00:48)                  Culture - Urine (collected 14 Apr 2022 00:48)  Source: Clean Catch Clean Catch (Midstream)  Final Report (15 Apr 2022 10:43):    Culture grew 3 or more types of organisms which indicate    collection contamination; consider recollection only if clinically    indicated.         Anemia Panel:      Thyroid Panel:                RADIOLOGY & ADDITIONAL TESTS:      HEALTH ISSUES - PROBLEM Dx:  Hematuria    Afib    GIB (gastrointestinal bleeding)    Anemia    CAD (coronary artery disease)    Acute kidney injury superimposed on CKD    Chronic CHF    HTN (hypertension)    HLD (hyperlipidemia)    DM (diabetes mellitus)    BPH (benign prostatic hyperplasia)    Anxiety and depression    DVT prophylaxis            Consultant(s) Notes Reviewed:  [ x ] YES     Care Discussed with [X] Consultants  [  ] Patient  [  ] Family [  ] HCP [  ]   [  ] Social Service  [  ] RN, [  ] Physical Therapy,[  ] Palliative care team  DVT PPX: [  ] Lovenox, [  ] S C Heparin, [  ] Coumadin, [  ] Xarelto, [  ] Eliquis, [  ] Pradaxa, [  ] IV Heparin drip, [  ] SCD [  ] Contraindication 2 to GI Bleed,[  ] Ambulation [ x ] Contraindicated 2 to  bleed [  ] Contraindicated 2 to Brain Bleed  Advanced directive: [x  ] None, [  ] DNR/DNI Patient is a 77y old  Male who presents with a chief complaint of Hematuria (17 Apr 2022 07:24)    HPI:  76yo M with PMH of Afib (on eliquis), MGUS, depression, CAD s/p stents, HLD, HTN, CKD, DM2, diverticulosis, Anemia , Diastolic  CHF (Echo 1/22 LVEF 55%) and BPH with recent GI Bleed 4/22 with multiple pRBC transfusions recently d/chio from [facility] 4/11/22 presents to the ED with hematuria. Patient states he went to void at 3AM and noticed it was difficult for him to urinate at first, but when he was able to void, he saw a lot of mikayla blood. Patient had 5 episodes of mikayla blood without clots along with dysuria. Denies trauma, falls, penile lesion. Per chart review, patient was admitted to [facility] on 3/30/22 for a rectal bleed and was transfused 4-5 units of pRBC and transferred to [facility] for further workup after a bleeding scan showed a jejunal bleed. During this time, Eliquis was held. Capsule endoscopy revealed possible small bleeding site. Pt's Eliquis was resumed on 4/8 with no reoccurrence of a GIB. Pt last took Eliquis this AM.     Admits to HA, dizziness, nausea, decreased PO intake. Denies fever/chills, chest pain, palpitations, vomiting, constipation, diarrhea, hematochezia.     ED course:   Vitals:  BP:106/71      HR: 61     Temp: 97.2      RR:18     O2: 96%  Labs significant for: H/H 9.4/28.1, PT 21.7, INR 1.84, PTT 45.8, BUN/Cr 31/2.3, glucose 116  EKG: sinus bradycardia with 1st degree AV block, LVH with QRS widening and repolarization abnormality (13 Apr 2022 11:54)    INTERVAL HPI:  4/14/22: Pt seen and examined at bedside. Complaining of 9/10 lower abdominal pain, nausea, as well as lots of mikayla blood loss from the penis. Bladder scan revealed >400cc of urine, pt attempted to use the bathroom but was actively bleeding. Urology Dr. Kiser spoke to the surgical PA, irrigated bladder and removed a copious amount of clots. CBI was ordered. Pt also complaining of b/l knee pain & headache. Denies fever/chills, chest pain, palpitations, vomiting/ constipation/ diarrhea/ hematochezia. Hgb dropped from 9.4 to 8.4, but otherwise hemodynamically stable.   04/15/22 Patient seen and examined at bedside. Patient states mild discomfort in pelvic area.  Hb dropped from 8.4 to 8.1 this AM , Low stable H/H   04/16/22 Patient seen and examined at bedside. Patient states that did not sleep well last night due to CBI leak. Today will stop CBI per uro recommendation. H/H low stable, mild pink urine in mckinnon   04/17/22 Patient seen and examined at bedside. Patient states that he has no complaints at this time. Denies chest pain, abdominal pain, n/v/c/d, fevers/chills. CBI titrated as needed until hematuria resolves. Draining light pink urine. H/H low but stable at 8.4. Constipation +    OVERNIGHT EVENTS:  None    Home Medications:  acetaminophen 325 mg oral tablet: 2 tab(s) orally every 6 hours, As needed, Temp greater or equal to 38C (100.4F), Mild Pain (1 - 3) (14 Apr 2022 16:23)  aspirin 81 mg oral tablet: 1 tab(s) orally once a day (13 Apr 2022 15:41)  budesonide-formoterol 80 mcg-4.5 mcg/inh inhalation aerosol: 2 puff(s) inhaled 2 times a day (13 Apr 2022 15:41)  cloNIDine 0.2 mg oral tablet: 1 tab(s) orally 2 times a day (13 Apr 2022 15:41)  diclofenac 1% topical gel: Apply topically to affected area 3 times a day (13 Apr 2022 15:41)  doxazosin 4 mg oral tablet: 1 tab(s) orally 2 times a day (13 Apr 2022 15:41)  famotidine 40 mg oral tablet: 1 tab(s) orally 2 times a day (13 Apr 2022 15:41)  isosorbide mononitrate 30 mg oral tablet, extended release: 1 tab(s) orally once a day (in the morning) (13 Apr 2022 15:41)  lactobacillus acidophilus oral capsule: 1 cap(s) orally 2 times a day (13 Apr 2022 15:41)  lamoTRIgine 100 mg oral tablet: 1 tab(s) orally once a day (13 Apr 2022 15:41)  metFORMIN 500 mg oral tablet: 1 tab(s) orally 2 times a day (13 Apr 2022 15:41)  oxybutynin 15 mg/24 hr oral tablet, extended release: 1 tab(s) orally once a day (13 Apr 2022 15:41)  potassium chloride 20 mEq oral tablet, extended release: 1 tab(s) orally once a day (13 Apr 2022 15:41)  Vitamin B-12 1000 mcg oral tablet: 1 tab(s) orally once a day (13 Apr 2022 15:41)      MEDICATIONS  (STANDING):  amLODIPine   Tablet 10 milliGRAM(s) Oral daily  atorvastatin 10 milliGRAM(s) Oral at bedtime  budesonide  80 MICROgram(s)/formoterol 4.5 MICROgram(s) Inhaler 2 Puff(s) Inhalation two times a day  carvedilol 3.125 milliGRAM(s) Oral every 12 hours  cloNIDine 0.2 milliGRAM(s) Oral two times a day  cyanocobalamin 1000 MICROGram(s) Oral daily  dextrose 5%. 1000 milliLiter(s) (100 mL/Hr) IV Continuous <Continuous>  dextrose 5%. 1000 milliLiter(s) (50 mL/Hr) IV Continuous <Continuous>  dextrose 50% Injectable 25 Gram(s) IV Push once  dextrose 50% Injectable 12.5 Gram(s) IV Push once  dextrose 50% Injectable 25 Gram(s) IV Push once  doxazosin Oral Tab/Cap - Peds 4 milliGRAM(s) Oral two times a day  famotidine    Tablet 40 milliGRAM(s) Oral two times a day  folic acid 1 milliGRAM(s) Oral daily  furosemide    Tablet 40 milliGRAM(s) Oral daily  glucagon  Injectable 1 milliGRAM(s) IntraMuscular once  hydrALAZINE 100 milliGRAM(s) Oral three times a day  insulin lispro (ADMELOG) corrective regimen sliding scale   SubCutaneous three times a day before meals  insulin lispro (ADMELOG) corrective regimen sliding scale   SubCutaneous at bedtime  isosorbide   mononitrate ER Tablet (IMDUR) 30 milliGRAM(s) Oral daily  lactobacillus acidophilus 1 Tablet(s) Oral two times a day  lamoTRIgine 100 milliGRAM(s) Oral daily  pantoprazole    Tablet 40 milliGRAM(s) Oral before breakfast  venlafaxine XR. 150 milliGRAM(s) Oral daily    MEDICATIONS  (PRN):  acetaminophen     Tablet .. 650 milliGRAM(s) Oral every 6 hours PRN Temp greater or equal to 38C (100.4F), Mild Pain (1 - 3)  bisacodyl Suppository 10 milliGRAM(s) Rectal daily PRN Constipation  dextrose Oral Gel 15 Gram(s) Oral once PRN Blood Glucose LESS THAN 70 milliGRAM(s)/deciliter  ondansetron Injectable 4 milliGRAM(s) IV Push every 8 hours PRN Nausea and/or Vomiting  traMADol 50 milliGRAM(s) Oral every 8 hours PRN Moderate Pain (4 - 6)      Allergies    No Known Allergies    Intolerances        Social History:  Tobacco: quit 45 years ago, former 10 pack years smoker   EtOH: denies  recreational drug use: denies  Lives with: alone  Ambulates:  independent  ADLs: independent  Occupation: retired  Vaccinations: Moderna x2 (13 Apr 2022 11:54)      REVIEW OF SYSTEMS: i AM Ok  CONSTITUTIONAL: No fever, No chills, No fatigue, No myalgia, No Body ache, No Weakness  EYES: No eye pain,  No visual disturbances, No discharge, NO Redness  ENMT:  No ear pain, No nose bleed, No vertigo; No sinus pain, NO throat pain, No Congestion  NECK: No pain, No stiffness  RESPIRATORY: No cough, NO wheezing, No  hemoptysis, NO  shortness of breath  CARDIOVASCULAR: No chest pain, palpitations  GASTROINTESTINAL: No abdominal pain, NO epigastric pain. No nausea, No vomiting; No diarrhea, No constipation. [ ] BM  GENITOURINARY: Admits to hematuria, mckinnon, No dysuria, No frequency, No urgency, NO incontinence  NEUROLOGICAL: No headaches, No dizziness, No numbness, No tingling, No tremors, No weakness  EXT: No Swelling, No Pain, No Edema  SKIN:  [x] No itching, burning, rashes, or lesions   MUSCULOSKELETAL: No joint pain ,No Jt swelling; No muscle pain, No back pain, No extremity pain  PSYCHIATRIC: No depression,  No anxiety,  No mood swings ,No difficulty sleeping at night  PAIN SCALE: [ x ] None  [  ] Other-  ROS Unable to obtain due to - [  ] Dementia  [  ] Lethargy [  ] Drowsy [  ] Sedated [  ] non verbal  REST OF REVIEW Of SYSTEM - [ x ] Normal     Vital Signs Last 24 Hrs  T(C): 36.9 (17 Apr 2022 05:23), Max: 37.1 (16 Apr 2022 13:08)  T(F): 98.5 (17 Apr 2022 05:23), Max: 98.7 (16 Apr 2022 13:08)  HR: 66 (17 Apr 2022 05:23) (64 - 79)  BP: 157/76 (17 Apr 2022 05:23) (150/70 - 157/76)  BP(mean): --  RR: 18 (17 Apr 2022 05:23) (16 - 18)  SpO2: 95% (17 Apr 2022 05:23) (94% - 95%)  Finger Stick        04-16 @ 07:01  -  04-17 @ 07:00  --------------------------------------------------------  IN: 720 mL / OUT: 68360 mL / NET: -09370 mL        PHYSICAL EXAM:  GENERAL:  [ x ] NAD , [x  ] well appearing, [  ] Agitated, [  ] Drowsy,  [  ] Lethargy, [  ] confused   HEAD:  [ x ] Normal, [  ] Other  EYES:  [  ] EOMI, [  ] PERRLA, [  x] conjunctiva and sclera clear normal, [  ] Other,  [  ] Pallor,[  ] Discharge  ENMT:  [ x ] Normal, [x  ] Moist mucous membranes, [x  ] Good dentition, [ x ] No Thrush  NECK:  [x  ] Supple, [x  ] No JVD, [ x ] Normal thyroid, [  ] Lymphadenopathy [  ] Other  CHEST/LUNG:  [ x ] Clear to auscultation bilaterally, [ x ] Breath Sounds equal B/L , [  ] poor effort  [ x ] No rales, [x  ] No rhonchi  [ x ]  No wheezing,   HEART:  [ x ] Regular rate and rhythm, [  ] tachycardia, [  ] Bradycardia,  [  ] irregular  [ x ] No murmurs, No rubs, No gallops, [ x ] PPM in place (Mfr:  )  ABDOMEN: Mckinnon in place [ x ] Soft, [ x ] Nontender-slightly tender to palpation NO R/R/G  , [ x ] Nondistended, [ x ] No mass, [ x ] Bowel sounds present, [ x ] obese  NERVOUS SYSTEM:  [x  ] Alert & Oriented X3, [ x ] Nonfocal  [  ] Confusion  [  ] Encephalopathic [  ] Sedated [  ] Unable to assess, [  ] Dementia [  ] Other-  EXTREMITIES: [x  ] 2+ Peripheral Pulses, No clubbing, No cyanosis,  [  ] edema B/L lower EXT. [  ] PVD stasis skin changes B/L Lower EXT, [  ] wound  LYMPH: No lymphadenopathy noted  SKIN:  [x  ] No rashes or lesions, [  ] Pressure Ulcers, [  ] ecchymosis, [  ] Skin Tears, [  ] Other    DIET: Diet, DASH/TLC:   Sodium & Cholesterol Restricted (04-13-22 @ 12:27)      LABS:                        8.4    5.81  )-----------( 241      ( 17 Apr 2022 09:13 )             25.8     17 Apr 2022 09:13    142    |  108    |  22     ----------------------------<  107    4.0     |  28     |  1.80     Ca    9.5        17 Apr 2022 09:13            Culture Results:   Culture grew 3 or more types of organisms which indicate  collection contamination; consider recollection only if clinically  indicated. (04-14 @ 00:48)      Culture - Urine (collected 14 Apr 2022 00:48)  Source: Clean Catch Clean Catch (Midstream)  Final Report (15 Apr 2022 10:43):    Culture grew 3 or more types of organisms which indicate    collection contamination; consider recollection only if clinically    indicated.         RADIOLOGY & ADDITIONAL TESTS: NONE      HEALTH ISSUES - PROBLEM Dx:  Hematuria    Afib    GIB (gastrointestinal bleeding)    Anemia    CAD (coronary artery disease)    Acute kidney injury superimposed on CKD    Chronic CHF    HTN (hypertension)    HLD (hyperlipidemia)    DM (diabetes mellitus)    BPH (benign prostatic hyperplasia)    Anxiety and depression    DVT prophylaxis            Consultant(s) Notes Reviewed:  [ x ] YES     Care Discussed with [X] Consultants  [x  ] Patient  [  ] Family [  ] HCP [  ]   [  ] Social Service  [x  ] RN, [  ] Physical Therapy,[  ] Palliative care team  DVT PPX: [  ] Lovenox, [  ] S C Heparin, [  ] Coumadin, [  ] Xarelto, [  ] Eliquis, [  ] Pradaxa, [  ] IV Heparin drip, [  ] SCD [  ] Contraindication 2 to GI Bleed,[  ] Ambulation [ x ] Contraindicated 2 to  bleed [  ] Contraindicated 2 to Brain Bleed  Advanced directive: [x  ] None, [  ] DNR/DNI

## 2022-04-17 NOTE — PROGRESS NOTE ADULT - PROBLEM SELECTOR PLAN 12
Chronic  - Continue home Lamictal qd      DVT ppx: SCDs in setting of hematuria Chronic  - Continue home Lamictal qd, Effexor  mg daily      DVT ppx: SCDs in setting of hematuria

## 2022-04-17 NOTE — PROGRESS NOTE ADULT - PROBLEM SELECTOR PLAN 1
Pt presents with x 5 episodes of Gross hematuria at home  -CBI PRN until hematuria resolves   - Possible TOV tomorrow 4/18    -Hold home Eliquis/ASA  -urine cytopathology reveals neutrophils and blood   -Urology Dr. Kiser Pt presents with x 5 episodes of Gross hematuria at home  -CBI continuous until hematuria resolves   - Possible TOV tomorrow 4/18  as per DR Alberts   -Hold home Eliquis/ASA  -urine cytopathology reveals neutrophils and blood -NEG for malignancy   -Urology Dr. Kiser.  RX Constipation -PO Senna, Miralax

## 2022-04-17 NOTE — PROGRESS NOTE ADULT - SUBJECTIVE AND OBJECTIVE BOX
[facility] -- Fifi Bliss, Kalpesh Valdovinos, Abraham Sheridan Savella, Goodger: Office # [phone]    Follow Up: Hx of Afib on Eliquis, now with hematuria; CAD s/p stents    Subjective/Observations: Patient seen and examined. Patient awake, alert, resting in bed. No complaints of chest pain, dyspnea, palpitations or dizziness. No signs of orthopnea or PND. CBI in progress, urine pink tinged     REVIEW OF SYSTEMS: All review of systems is negative for eye, ENT, GI, , allergic, dermatologic, musculoskeletal and neurologic except as described above    PAST MEDICAL & SURGICAL HISTORY:  HTN (hypertension)  c/b multiple episodes of hypertensive urgency    HLD (hyperlipidemia)    Atrial fibrillation  first documented on EKG 10/7/2021    CAD (coronary artery disease)  s/p stents (not on plavix)    Type 2 diabetes mellitus  not on home insulin/ Meds    Anxiety  Depression  multiple psych medications    History of diverticulitis  07/2021    Diverticulosis  c/b GIB in 2020    Afib  on AC    Stage 3 chronic kidney disease    Anemia of chronic disease  Monoclonal Gammopathy-MGUS  pRBC transfusion 10/15/21    Chronic diastolic congestive heart failure    Multiple thyroid nodules    H/O: upper GI bleed  4/22, small Bowel Bleed, 5 u pRBC Transfusion 4/22  S/P Colonoscopy- Diverticulosis  S/P Capsule Endoscopy done -[facility] -No active bleeding    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  Had surgery ( April 2013 )    S/P tonsillectomy    S/P arthroscopic knee surgery  Bilateral ( 2005 )    Torsion of testicle  Had surgery at age 14    Pilonidal cyst  Had surgery ( 1969 )    S/P cataract surgery  Bilateral    H/O hernia repair    MEDICATIONS  (STANDING):  amLODIPine   Tablet 10 milliGRAM(s) Oral daily  atorvastatin 10 milliGRAM(s) Oral at bedtime  budesonide  80 MICROgram(s)/formoterol 4.5 MICROgram(s) Inhaler 2 Puff(s) Inhalation two times a day  carvedilol 3.125 milliGRAM(s) Oral every 12 hours  cloNIDine 0.2 milliGRAM(s) Oral two times a day  cyanocobalamin 1000 MICROGram(s) Oral daily  dextrose 5%. 1000 milliLiter(s) (100 mL/Hr) IV Continuous <Continuous>  dextrose 5%. 1000 milliLiter(s) (50 mL/Hr) IV Continuous <Continuous>  dextrose 50% Injectable 25 Gram(s) IV Push once  dextrose 50% Injectable 12.5 Gram(s) IV Push once  dextrose 50% Injectable 25 Gram(s) IV Push once  doxazosin Oral Tab/Cap - Peds 4 milliGRAM(s) Oral two times a day  famotidine    Tablet 40 milliGRAM(s) Oral two times a day  folic acid 1 milliGRAM(s) Oral daily  furosemide    Tablet 40 milliGRAM(s) Oral daily  glucagon  Injectable 1 milliGRAM(s) IntraMuscular once  hydrALAZINE 100 milliGRAM(s) Oral three times a day  insulin lispro (ADMELOG) corrective regimen sliding scale   SubCutaneous three times a day before meals  insulin lispro (ADMELOG) corrective regimen sliding scale   SubCutaneous at bedtime  isosorbide   mononitrate ER Tablet (IMDUR) 30 milliGRAM(s) Oral daily  lactobacillus acidophilus 1 Tablet(s) Oral two times a day  lamoTRIgine 100 milliGRAM(s) Oral daily  pantoprazole    Tablet 40 milliGRAM(s) Oral before breakfast  venlafaxine XR. 150 milliGRAM(s) Oral daily    MEDICATIONS  (PRN):  acetaminophen     Tablet .. 650 milliGRAM(s) Oral every 6 hours PRN Temp greater or equal to 38C (100.4F), Mild Pain (1 - 3)  bisacodyl Suppository 10 milliGRAM(s) Rectal daily PRN Constipation  dextrose Oral Gel 15 Gram(s) Oral once PRN Blood Glucose LESS THAN 70 milliGRAM(s)/deciliter  ondansetron Injectable 4 milliGRAM(s) IV Push every 8 hours PRN Nausea and/or Vomiting  traMADol 50 milliGRAM(s) Oral every 8 hours PRN Moderate Pain (4 - 6)    Allergies  No Known Allergies    Vital Signs Last 24 Hrs  T(C): 36.9 (17 Apr 2022 05:23), Max: 37.1 (16 Apr 2022 13:08)  T(F): 98.5 (17 Apr 2022 05:23), Max: 98.7 (16 Apr 2022 13:08)  HR: 66 (17 Apr 2022 05:23) (64 - 79)  BP: 157/76 (17 Apr 2022 05:23) (150/70 - 157/76)  BP(mean): --  RR: 18 (17 Apr 2022 05:23) (16 - 18)  SpO2: 95% (17 Apr 2022 05:23) (94% - 95%)  I&O's Summary    16 Apr 2022 07:01  -  17 Apr 2022 07:00  --------------------------------------------------------  IN: 720 mL / OUT: 12690 mL / NET: -81495 mL    17 Apr 2022 07:01  -  17 Apr 2022 12:31  --------------------------------------------------------  IN: 1000 mL / OUT: 1750 mL / NET: -750 mL      TELE: Not on telemetry   PHYSICAL EXAM:  Constitutional: NAD, awake and alert,   HEENT: Moist Mucous Membranes, Anicteric  Pulmonary: Non-labored, breath sounds are clear bilaterally, No wheezing, rales or rhonchi  Cardiovascular: Regular, S1 and S2, No murmurs, rubs, gallops or clicks  Gastrointestinal:  soft, nontender  : + El   Lymph: No peripheral edema. No lymphadenopathy.   Skin: No visible rashes or ulcers.  Psych:  Mood & affect appropriate      LABS: All Labs Reviewed:                        8.4    5.81  )-----------( 241      ( 17 Apr 2022 09:13 )             25.8                         8.5    4.58  )-----------( 219      ( 16 Apr 2022 07:14 )             26.3                         8.1    5.07  )-----------( 211      ( 15 Apr 2022 08:39 )             24.4     17 Apr 2022 09:13    142    |  108    |  22     ----------------------------<  107    4.0     |  28     |  1.80   16 Apr 2022 07:14    141    |  110    |  20     ----------------------------<  97     4.2     |  24     |  1.60   15 Apr 2022 08:39    141    |  108    |  22     ----------------------------<  122    3.3     |  26     |  1.80     Ca    9.5        17 Apr 2022 09:13  Ca    9.4        16 Apr 2022 07:14  Ca    9.1        15 Apr 2022 08:39  Phos  3.5       15 Apr 2022 08:39  Mg     2.3       15 Apr 2022 08:39    TPro  5.6    /  Alb  2.9    /  TBili  0.2    /  DBili  x      /  AST  12     /  ALT  16     /  AlkPhos  60     15 Apr 2022 08:39       12 Lead ECG:   Ventricular Rate 52 BPM    Atrial Rate 52 BPM    P-R Interval 348 ms    QRS Duration 116 ms    Q-T Interval 496 ms    QTC Calculation(Bazett) 461 ms    P Axis 49 degrees    R Axis -11 degrees    T Axis 179 degrees    Diagnosis Line Sinus bradycardia with 1st degree AV block  Left ventricular hypertrophy with QRS widening and repolarization abnormality ( R in aVL , Kain product )  Abnormal ECG  When compared with ECG of 30-MAR-2022 13:01,  No significant change was found  Confirmed by Bonifacio BARRERA, Victorino ([id number]) on 4/13/2022 10:48:39 AM (04-13-22 @ 09:45)       EXAM:  ECHO TTE WO CON COMP W DOPP         PROCEDURE DATE:  01/06/2022        INTERPRETATION:  INDICATION: Dyspnea  Sonographer PH    Blood Pressure 177/86    Height 172.7 cm     Weight 104.4 kg    Dimensions:  LA 4.2       Normal Values: 2.0 - 4.0 cm  Ao 3.7        Normal Values: 2.0 - 3.8 cm  SEPTUM 1.4       Normal Values: 0.6 - 1.2 cm  PWT 1.4       Normal Values: 0.6 - 1.1 cm  LVIDd 5.2         Normal Values: 3.0 - 5.6 cm  LVIDs 4.2         Normal Values: 1.8 - 4.0 cm      OBSERVATIONS:  Technically difficult study  Mitral Valve: Mitral annular calcification with thickened leaflets, mild   MR.  Aortic Valve/Aorta: Calcified trileaflet aortic valve with decreased   opening. Peak transaortic valve gradient is 29.4 mmHg with a mean   transaortic valve gradient 14.7 mmHg. This consistent with mild aortic   stenosis.  Tricuspid Valve: Mild TR.  Pulmonic Valve: Not well-visualized  Left Atrium: Enlarged  Right Atrium: Not well-visualized  Left Ventricle: Moderate left ventricular hypertrophy with normal   systolic function, estimated LVEF of 55%.  Right Ventricle: Grossly normal size and systolic function.  Pericardium: no significant pericardial effusion.  Pulmonary/RV Pressure: estimated PA systolic pressure of 32mmHg        IMPRESSION:  Technically difficult study  Moderate left ventricular hypertrophy with normal systolic function,   estimated LVEF of 55%.  Grossly normal RV size and systolic function.  Calcified trileaflet aortic valve with mild aortic stenosis  Mild MR andTR.  No significant pericardial effusion.    --- End of Report ---              VIET GREENWOOD MD; Attending Cardiologist  This document has been electronically signed. Jan 7 2022 11:19AM

## 2022-04-17 NOTE — PROGRESS NOTE ADULT - PROBLEM SELECTOR PLAN 9
Pt recently admitted to [facility] for GIB,  got 5 u pRBC in total at [facility]   -Pt was transferred to [facility] for Capsule endoscopy  and d/c'd 4/11  -Denies GIB since hospitalization  -Started Pantoprazole qd  -Continue home Pepcid  -Continue to monitor for signs/symptoms of GIB Pt recently admitted to [facility] for GIB,  got 5 u pRBC in total at [facility]   -Pt was transferred to [facility] for Capsule endoscopy  and d/c'd 4/11, pt had jejunal bleeding on Bleeding scan  -Capsule reviewed. One small spot of blood at 93 percent of small bowel capsule transient time. No other pathology appreciated. No overt bleeding seen.       -Denies GIB since hospitalization  -Started Pantoprazole qd  -Continue home Pepcid  -Continue to monitor for signs/symptoms of GIB

## 2022-04-17 NOTE — PROGRESS NOTE ADULT - PROBLEM SELECTOR PLAN 2
-Acute on Chronic Anemia , Recent Upper GI Bleed , Now with Hematuria  - Hb stable this AM 8.4  - recent GI Bleed -small bowel- Jejunum,   pt had Colonoscopy -Diverticulosis & Capsule Endoscopy at [facility] last week -NO active GI bleed.   - Transfused for Hb less than 8  - daily CBC  - Holding home Eliquis & ASA -Acute on Chronic Anemia , Recent Upper GI Bleed , Now with Hematuria H/O MGUS   - Hb stable this AM 8.4  - recent GI Bleed -small bowel- Jejunum,   pt had Colonoscopy -Diverticulosis & Capsule Endoscopy at [facility] 4/7 -NO active GI bleed.   - Transfused for Hb less than 8  - daily CBC  - Holding home Eliquis & ASA

## 2022-04-17 NOTE — PROGRESS NOTE ADULT - PROBLEM SELECTOR PLAN 3
CKD 3-Cr 2.3 on admission, baseline per chart review ~1.9-3,  - Creatinine downtrending 1.6 from 1.8   - Follow BMP  - Renal Dose Meds CKD 3-Cr 2.3 on admission, baseline per chart review ~1.9-3,  - Creatinine to baseline   - Follow BMP  - Renal Dose Meds

## 2022-04-18 DIAGNOSIS — K59.00 CONSTIPATION, UNSPECIFIED: ICD-10-CM

## 2022-04-18 LAB
ANION GAP SERPL CALC-SCNC: 9 MMOL/L — SIGNIFICANT CHANGE UP (ref 5–17)
BUN SERPL-MCNC: 28 MG/DL — HIGH (ref 7–23)
CALCIUM SERPL-MCNC: 9 MG/DL — SIGNIFICANT CHANGE UP (ref 8.5–10.1)
CHLORIDE SERPL-SCNC: 106 MMOL/L — SIGNIFICANT CHANGE UP (ref 96–108)
CO2 SERPL-SCNC: 25 MMOL/L — SIGNIFICANT CHANGE UP (ref 22–31)
CREAT SERPL-MCNC: 1.8 MG/DL — HIGH (ref 0.5–1.3)
EGFR: 39 ML/MIN/1.73M2 — LOW
GLUCOSE SERPL-MCNC: 104 MG/DL — HIGH (ref 70–99)
HCT VFR BLD CALC: 25.6 % — LOW (ref 39–50)
HGB BLD-MCNC: 8.4 G/DL — LOW (ref 13–17)
MCHC RBC-ENTMCNC: 29.1 PG — SIGNIFICANT CHANGE UP (ref 27–34)
MCHC RBC-ENTMCNC: 32.8 GM/DL — SIGNIFICANT CHANGE UP (ref 32–36)
MCV RBC AUTO: 88.6 FL — SIGNIFICANT CHANGE UP (ref 80–100)
NRBC # BLD: 0 /100 WBCS — SIGNIFICANT CHANGE UP (ref 0–0)
PLATELET # BLD AUTO: 241 K/UL — SIGNIFICANT CHANGE UP (ref 150–400)
POTASSIUM SERPL-MCNC: 3.4 MMOL/L — LOW (ref 3.5–5.3)
POTASSIUM SERPL-SCNC: 3.4 MMOL/L — LOW (ref 3.5–5.3)
RBC # BLD: 2.89 M/UL — LOW (ref 4.2–5.8)
RBC # FLD: 15.9 % — HIGH (ref 10.3–14.5)
SODIUM SERPL-SCNC: 140 MMOL/L — SIGNIFICANT CHANGE UP (ref 135–145)
WBC # BLD: 7.5 K/UL — SIGNIFICANT CHANGE UP (ref 3.8–10.5)
WBC # FLD AUTO: 7.5 K/UL — SIGNIFICANT CHANGE UP (ref 3.8–10.5)

## 2022-04-18 PROCEDURE — 99232 SBSQ HOSP IP/OBS MODERATE 35: CPT

## 2022-04-18 RX ORDER — POTASSIUM CHLORIDE 20 MEQ
40 PACKET (EA) ORAL ONCE
Refills: 0 | Status: COMPLETED | OUTPATIENT
Start: 2022-04-18 | End: 2022-04-18

## 2022-04-18 RX ORDER — ISOSORBIDE MONONITRATE 60 MG/1
60 TABLET, EXTENDED RELEASE ORAL DAILY
Refills: 0 | Status: DISCONTINUED | OUTPATIENT
Start: 2022-04-18 | End: 2022-04-19

## 2022-04-18 RX ORDER — POTASSIUM CHLORIDE 20 MEQ
20 PACKET (EA) ORAL DAILY
Refills: 0 | Status: DISCONTINUED | OUTPATIENT
Start: 2022-04-19 | End: 2022-04-19

## 2022-04-18 RX ADMIN — FAMOTIDINE 40 MILLIGRAM(S): 10 INJECTION INTRAVENOUS at 17:52

## 2022-04-18 RX ADMIN — SENNA PLUS 2 TABLET(S): 8.6 TABLET ORAL at 21:27

## 2022-04-18 RX ADMIN — ISOSORBIDE MONONITRATE 60 MILLIGRAM(S): 60 TABLET, EXTENDED RELEASE ORAL at 12:02

## 2022-04-18 RX ADMIN — FAMOTIDINE 40 MILLIGRAM(S): 10 INJECTION INTRAVENOUS at 06:01

## 2022-04-18 RX ADMIN — CARVEDILOL PHOSPHATE 3.12 MILLIGRAM(S): 80 CAPSULE, EXTENDED RELEASE ORAL at 06:04

## 2022-04-18 RX ADMIN — Medication 40 MILLIGRAM(S): at 05:53

## 2022-04-18 RX ADMIN — Medication 40 MILLIEQUIVALENT(S): at 09:52

## 2022-04-18 RX ADMIN — Medication 0.2 MILLIGRAM(S): at 05:54

## 2022-04-18 RX ADMIN — Medication 1: at 12:13

## 2022-04-18 RX ADMIN — Medication 100 MILLIGRAM(S): at 14:20

## 2022-04-18 RX ADMIN — PANTOPRAZOLE SODIUM 40 MILLIGRAM(S): 20 TABLET, DELAYED RELEASE ORAL at 05:54

## 2022-04-18 RX ADMIN — LAMOTRIGINE 100 MILLIGRAM(S): 25 TABLET, ORALLY DISINTEGRATING ORAL at 12:02

## 2022-04-18 RX ADMIN — Medication 0: at 21:44

## 2022-04-18 RX ADMIN — ATORVASTATIN CALCIUM 10 MILLIGRAM(S): 80 TABLET, FILM COATED ORAL at 21:27

## 2022-04-18 RX ADMIN — Medication 100 MILLIGRAM(S): at 05:54

## 2022-04-18 RX ADMIN — AMLODIPINE BESYLATE 10 MILLIGRAM(S): 2.5 TABLET ORAL at 05:55

## 2022-04-18 RX ADMIN — Medication 4 MILLIGRAM(S): at 17:53

## 2022-04-18 RX ADMIN — Medication 1 MILLIGRAM(S): at 12:02

## 2022-04-18 RX ADMIN — Medication 100 MILLIGRAM(S): at 21:43

## 2022-04-18 RX ADMIN — Medication 1 TABLET(S): at 17:52

## 2022-04-18 RX ADMIN — PREGABALIN 1000 MICROGRAM(S): 225 CAPSULE ORAL at 12:02

## 2022-04-18 RX ADMIN — Medication 4 MILLIGRAM(S): at 05:54

## 2022-04-18 RX ADMIN — POLYETHYLENE GLYCOL 3350 17 GRAM(S): 17 POWDER, FOR SOLUTION ORAL at 12:02

## 2022-04-18 RX ADMIN — Medication 650 MILLIGRAM(S): at 22:09

## 2022-04-18 RX ADMIN — Medication 150 MILLIGRAM(S): at 12:10

## 2022-04-18 RX ADMIN — Medication 0.2 MILLIGRAM(S): at 17:53

## 2022-04-18 RX ADMIN — Medication 1 TABLET(S): at 05:54

## 2022-04-18 RX ADMIN — CARVEDILOL PHOSPHATE 3.12 MILLIGRAM(S): 80 CAPSULE, EXTENDED RELEASE ORAL at 17:52

## 2022-04-18 NOTE — PROGRESS NOTE ADULT - PROBLEM SELECTOR PLAN 5
Chronic Diastolic CHF   -Continue home Lasix 40 mg daily ,  -Last echo 1/2022 Moderate left ventricular hypertrophy with normal systolic function, estimated LVEF of 55%. Grossly normal RV size and systolic function. Calcified trileaflet aortic valve with mild aortic stenosis. Mild MR and TR. No significant pericardial effusion.  -Dash/ TLC Diet  Cardio DR. Bliss's group following Chronic, elevated SBP this   - Imdur increased dose to 60 mg qd   - Continue home Clonidine, Hydralazine, Coreg, Amlodipine and  Lasix  daily   - Dash Diet

## 2022-04-18 NOTE — PROGRESS NOTE ADULT - NS ATTEND AMEND GEN_ALL_CORE FT
No signs of significant ischemia or volume overload. cont current care. Further cardiac workup will depend on clinical course.
no sign of acute ischemia or volume overload. bp improving overall
No signs of significant ischemia or volume overload. cont current care. Further cardiac workup will depend on clinical course.
No signs of significant ischemia. Appears compensated from HF POV. cont current care. Further cardiac workup will depend on clinical course.
No evidence of any active cardiac conditions . Continue current treatment.  To follow while admitted.

## 2022-04-18 NOTE — PROGRESS NOTE ADULT - PROBLEM SELECTOR PLAN 2
-Acute on Chronic Anemia , Recent Upper GI Bleed , Now with Hematuria H/O MGUS   - Hb stable this AM 8.4  - recent GI Bleed -small bowel- Jejunum,   pt had Colonoscopy -Diverticulosis & Capsule Endoscopy at [facility] 4/7 -NO active GI bleed.   - Transfused for Hb less than 8  - daily CBC  - Holding home Eliquis & ASA Patient had this AM a BM since admission   - Continue dulcolax suppository PRN Patient had this AM a BM since admission   - On senna and Miralax qd    - Continue dulcolax suppository PRN

## 2022-04-18 NOTE — PROGRESS NOTE ADULT - PROBLEM SELECTOR PLAN 9
Pt recently admitted to [facility] for GIB,  got 5 u pRBC in total at [facility]   -Pt was transferred to [facility] for Capsule endoscopy  and d/c'd 4/11, pt had jejunal bleeding on Bleeding scan  -Capsule reviewed. One small spot of blood at 93 percent of small bowel capsule transient time. No other pathology appreciated. No overt bleeding seen.       -Denies GIB since hospitalization  -Started Pantoprazole qd  -Continue home Pepcid  -Continue to monitor for signs/symptoms of GIB Diabetes type II   Hold oral hypoglycemic meds  Insulin Corrective Scale  Finger sticks per routine  Consistent Carb Diet  Hypoglycemia protocol

## 2022-04-18 NOTE — PROGRESS NOTE ADULT - PROBLEM SELECTOR PLAN 3
CKD 3-Cr 2.3 on admission, baseline per chart review ~1.9-3,  - Creatinine to baseline   - Follow BMP  - Renal Dose Meds -Acute on Chronic Anemia   - History of GIB, now admitted with hematuria that is resolved   - Hb low stable   - No sign or symptoms of active bleeding

## 2022-04-18 NOTE — PROGRESS NOTE ADULT - PROBLEM SELECTOR PLAN 12
Chronic  - Continue home Lamictal qd, Effexor  mg daily  - Per counselor at group home, pt has not been on IM Aristada for several months (was discharged with Aristada form [facility] hospital on 4/11). Will hold off       DVT ppx: SCDs in setting of hematuria Continue home Doxazosin and Oxybutynin

## 2022-04-18 NOTE — PROGRESS NOTE ADULT - PROBLEM SELECTOR PLAN 8
Diabetes type II   Hold oral hypoglycemic meds  Insulin Corrective Scale  Finger sticks per routine  Consistent Carb Diet  Hypoglycemia protocol Chronic, history of stents  - Continue Coreg,  Atorvastatin and Imdur   - Hold Eliquis and ASA in setting of hematuria  - Cardio DR. Bliss's group following Chronic, history of stents  - Continue Coreg,  Atorvastatin and Imdur dose Increase as per Cardiology+ BM    - Hold Eliquis and ASA in setting of hematuria  - Cardio DR. Bliss's group following

## 2022-04-18 NOTE — PROGRESS NOTE ADULT - PROBLEM SELECTOR PLAN 1
Pt presents with x 5 episodes of Gross hematuria at home  -CBI discontinued today per uro recs   - TOV today 4/18 as per Dr. Kiser   -Hold home Eliquis/ASA  -urine cytopathology reveals neutrophils and blood -NEG for malignancy   -Urology Dr. Kiser.  RX Constipation -PO Senna, Miralax, enema -Stop CBI and  TOV today 4/18 as per Dr. Kiser   - F/up bladder scan at 19:00  -Hold home Eliquis/ASA  -urine cytopathology reveals neutrophils and blood -NEG for malignancy   -Urology Dr. Kiser.

## 2022-04-18 NOTE — PROGRESS NOTE ADULT - SUBJECTIVE AND OBJECTIVE BOX
Patient is a 77y old  Male who presents with a chief complaint of Hematuria (14 Apr 2022 10:23)    Patient seen in follow up for CKD 4.        PAST MEDICAL HISTORY:  Hypertension    Diabetes mellitus    Lupus    Hepatitis C    Anxiety and depression    CAD (coronary artery disease)    Diverticulosis    Hyperlipidemia    HTN (hypertension)    HLD (hyperlipidemia)    Atrial fibrillation    CAD (coronary artery disease)    Type 2 diabetes mellitus    Anxiety    History of diverticulitis    Diverticulosis    Afib    Stage 3 chronic kidney disease    Anemia of chronic disease    Chronic diastolic congestive heart failure    Multiple thyroid nodules    H/O: upper GI bleed      MEDICATIONS  (STANDING):  amLODIPine   Tablet 10 milliGRAM(s) Oral daily  atorvastatin 10 milliGRAM(s) Oral at bedtime  budesonide  80 MICROgram(s)/formoterol 4.5 MICROgram(s) Inhaler 2 Puff(s) Inhalation two times a day  carvedilol 3.125 milliGRAM(s) Oral every 12 hours  cloNIDine 0.2 milliGRAM(s) Oral two times a day  cyanocobalamin 1000 MICROGram(s) Oral daily  dextrose 5%. 1000 milliLiter(s) (100 mL/Hr) IV Continuous <Continuous>  dextrose 5%. 1000 milliLiter(s) (50 mL/Hr) IV Continuous <Continuous>  dextrose 50% Injectable 25 Gram(s) IV Push once  dextrose 50% Injectable 12.5 Gram(s) IV Push once  dextrose 50% Injectable 25 Gram(s) IV Push once  doxazosin Oral Tab/Cap - Peds 4 milliGRAM(s) Oral two times a day  famotidine    Tablet 40 milliGRAM(s) Oral two times a day  folic acid 1 milliGRAM(s) Oral daily  furosemide    Tablet 40 milliGRAM(s) Oral daily  glucagon  Injectable 1 milliGRAM(s) IntraMuscular once  hydrALAZINE 100 milliGRAM(s) Oral three times a day  insulin lispro (ADMELOG) corrective regimen sliding scale   SubCutaneous three times a day before meals  insulin lispro (ADMELOG) corrective regimen sliding scale   SubCutaneous at bedtime  isosorbide   mononitrate ER Tablet (IMDUR) 60 milliGRAM(s) Oral daily  lactobacillus acidophilus 1 Tablet(s) Oral two times a day  lamoTRIgine 100 milliGRAM(s) Oral daily  pantoprazole    Tablet 40 milliGRAM(s) Oral before breakfast  polyethylene glycol 3350 17 Gram(s) Oral daily  saline laxative (FLEET) Rectal Enema 1 Enema Rectal once  senna 2 Tablet(s) Oral at bedtime  venlafaxine XR. 150 milliGRAM(s) Oral daily    MEDICATIONS  (PRN):  acetaminophen     Tablet .. 650 milliGRAM(s) Oral every 6 hours PRN Temp greater or equal to 38C (100.4F), Mild Pain (1 - 3)  bisacodyl Suppository 10 milliGRAM(s) Rectal daily PRN Constipation  dextrose Oral Gel 15 Gram(s) Oral once PRN Blood Glucose LESS THAN 70 milliGRAM(s)/deciliter  ondansetron Injectable 4 milliGRAM(s) IV Push every 8 hours PRN Nausea and/or Vomiting  traMADol 50 milliGRAM(s) Oral every 8 hours PRN Moderate Pain (4 - 6)    T(C): 36.6 (04-18-22 @ 05:20), Max: 37.1 (04-16-22 @ 13:08)  HR: 71 (04-18-22 @ 05:20) (64 - 79)  BP: 160/63 (04-18-22 @ 05:20) (114/71 - 160/63)  RR: 17 (04-18-22 @ 05:20)  SpO2: 95% (04-18-22 @ 05:20)  Wt(kg): --  I&O's Detail    17 Apr 2022 07:01  -  18 Apr 2022 07:00  --------------------------------------------------------  IN:    Continuous Bladder Irrigation (mL): 6500 mL  Total IN: 6500 mL    OUT:    Continuous Bladder Irrigation (mL): 8330 mL  Total OUT: 8330 mL    Total NET: -1830 mL                  PHYSICAL EXAM:  General: No distress  Respiratory: b/l air entry  Cardiovascular: S1 S2  Gastrointestinal: soft  Extremities:  edema                         LABORATORY:                        8.4    7.50  )-----------( 241      ( 18 Apr 2022 08:57 )             25.6     04-18    140  |  106  |  28<H>  ----------------------------<  104<H>  3.4<L>   |  25  |  1.80<H>    Ca    9.0      18 Apr 2022 08:57      Sodium, Serum: 140 mmol/L (04-18 @ 08:57)  Sodium, Serum: 142 mmol/L (04-17 @ 09:13)    Potassium, Serum: 3.4 mmol/L (04-18 @ 08:57)  Potassium, Serum: 4.0 mmol/L (04-17 @ 09:13)    Hemoglobin: 8.4 g/dL (04-18 @ 08:57)  Hemoglobin: 8.4 g/dL (04-17 @ 09:13)  Hemoglobin: 8.5 g/dL (04-16 @ 07:14)    Creatinine, Serum 1.80 (04-18 @ 08:57)  Creatinine, Serum 1.80 (04-17 @ 09:13)  Creatinine, Serum 1.60 (04-16 @ 07:14)

## 2022-04-18 NOTE — PROGRESS NOTE ADULT - SUBJECTIVE AND OBJECTIVE BOX
INTERVAL Hx:  No acute events overnight.  El draining clear, colorless urine.  Pt w/o complaints.  Urine cytology negative for malignant cells.    MEDICATIONS  (STANDING):  amLODIPine   Tablet 10 milliGRAM(s) Oral daily  atorvastatin 10 milliGRAM(s) Oral at bedtime  budesonide  80 MICROgram(s)/formoterol 4.5 MICROgram(s) Inhaler 2 Puff(s) Inhalation two times a day  carvedilol 3.125 milliGRAM(s) Oral every 12 hours  cloNIDine 0.2 milliGRAM(s) Oral two times a day  cyanocobalamin 1000 MICROGram(s) Oral daily  dextrose 5%. 1000 milliLiter(s) (100 mL/Hr) IV Continuous <Continuous>  dextrose 5%. 1000 milliLiter(s) (50 mL/Hr) IV Continuous <Continuous>  dextrose 50% Injectable 25 Gram(s) IV Push once  dextrose 50% Injectable 12.5 Gram(s) IV Push once  dextrose 50% Injectable 25 Gram(s) IV Push once  doxazosin Oral Tab/Cap - Peds 4 milliGRAM(s) Oral two times a day  famotidine    Tablet 40 milliGRAM(s) Oral two times a day  folic acid 1 milliGRAM(s) Oral daily  furosemide    Tablet 40 milliGRAM(s) Oral daily  glucagon  Injectable 1 milliGRAM(s) IntraMuscular once  hydrALAZINE 100 milliGRAM(s) Oral three times a day  insulin lispro (ADMELOG) corrective regimen sliding scale   SubCutaneous three times a day before meals  insulin lispro (ADMELOG) corrective regimen sliding scale   SubCutaneous at bedtime  isosorbide   mononitrate ER Tablet (IMDUR) 30 milliGRAM(s) Oral daily  lactobacillus acidophilus 1 Tablet(s) Oral two times a day  lamoTRIgine 100 milliGRAM(s) Oral daily  pantoprazole    Tablet 40 milliGRAM(s) Oral before breakfast  polyethylene glycol 3350 17 Gram(s) Oral daily  senna 2 Tablet(s) Oral at bedtime  venlafaxine XR. 150 milliGRAM(s) Oral daily    MEDICATIONS  (PRN):  acetaminophen     Tablet .. 650 milliGRAM(s) Oral every 6 hours PRN Temp greater or equal to 38C (100.4F), Mild Pain (1 - 3)  bisacodyl Suppository 10 milliGRAM(s) Rectal daily PRN Constipation  dextrose Oral Gel 15 Gram(s) Oral once PRN Blood Glucose LESS THAN 70 milliGRAM(s)/deciliter  ondansetron Injectable 4 milliGRAM(s) IV Push every 8 hours PRN Nausea and/or Vomiting  traMADol 50 milliGRAM(s) Oral every 8 hours PRN Moderate Pain (4 - 6)        Vital Signs Last 24 Hrs  T(C): 36.6 (18 Apr 2022 05:20), Max: 36.8 (17 Apr 2022 20:21)  T(F): 97.9 (18 Apr 2022 05:20), Max: 98.2 (17 Apr 2022 20:21)  HR: 71 (18 Apr 2022 05:20) (68 - 75)  BP: 160/63 (18 Apr 2022 05:20) (114/71 - 160/63)  BP(mean): --  RR: 17 (18 Apr 2022 05:20) (17 - 17)  SpO2: 95% (18 Apr 2022 05:20) (95% - 96%)    PHYSICAL EXAM:    ABDOMEN: soft, NT    El: draining clear, colorless urine    LABS:                        8.4    5.81  )-----------( 241      ( 17 Apr 2022 09:13 )             25.8     04-17    142  |  108  |  22  ----------------------------<  107<H>  4.0   |  28  |  1.80<H>    Ca    9.5      17 Apr 2022 09:13          Review of Systems:    CONSTITUTIONAL: No weakness, fevers or chills    SKIN: No itching, burning, rashes, or lesions     EYES/ENT: No visual changes;  No vertigo or throat pain     NECK: No pain or stiffness    RESPIRATORY: No cough, wheezing, hemoptysis; No shortness of breath    CARDIOVASCULAR: No chest pain or palpitations    GASTROINTESTINAL: No abdominal or epigastric pain. No nausea, vomiting, diarrhea    NEUROLOGICAL: No numbness or weakness

## 2022-04-18 NOTE — PROGRESS NOTE ADULT - PROBLEM SELECTOR PLAN 10
Chronic  - Continue home atorvastatin Pt recently admitted to [facility] for GIB,  got 5 u pRBC in total at [facility]   -Denies GIB since hospitalization  -On Pantoprazole qd  -Continue home Pepcid  -Continue to monitor for signs/symptoms of GIB

## 2022-04-18 NOTE — PROGRESS NOTE ADULT - PROBLEM SELECTOR PLAN 7
Chronic, history of stents  - Continue Coreg,  Atorvastatin and Imdur   - Hold Eliquis and ASA in setting of hematuria  - Cardio DR. Bliss's group following Chronic, HR stable   Continue Coreg  2x day  Holding Eliquis /ASA in setting of hematuria

## 2022-04-18 NOTE — PROGRESS NOTE ADULT - PROBLEM SELECTOR PLAN 4
Chronic, elevated SBP this   Continue home Clonidine, Hydralazine, Coreg, Amlodipine and  Lasix  daily   Dash Diet - baseline per chart review ~1.9-3,  - Creatinine to baseline 1.80  - Follow BMP  - Renal Dose Meds

## 2022-04-18 NOTE — PROGRESS NOTE ADULT - PROBLEM SELECTOR PLAN 6
Chronic, HR stable   Continue Coreg  2x day  Holding Eliquis /ASA in setting of hematuria   EKG: sinus bradycardia with 1st degree AV block, LVH with QRS widening and repolarization abnormality  Cardio DR. Bliss's group following Chronic Diastolic CHF   -Continue home Lasix 40 mg daily ,  -Dash/ TLC Diet  Cardio DR. Bliss's group following HFpEF   -Continue home Lasix 40 mg daily ,  -Dash/ TLC Diet  - Cardio DR. Bliss's group following

## 2022-04-18 NOTE — PROGRESS NOTE ADULT - SUBJECTIVE AND OBJECTIVE BOX
Patient is a 77y old  Male who presents with a chief complaint of Hematuria (18 Apr 2022 09:41)    HPI:  78yo M with PMH of Afib (on eliquis), MGUS, depression, CAD s/p stents, HLD, HTN, CKD, DM2, diverticulosis, Anemia , Diastolic  CHF (Echo 1/22 LVEF 55%) and BPH with recent GI Bleed 4/22 with multiple pRBC transfusions recently d/chio from [facility] 4/11/22 presents to the ED with hematuria. Patient states he went to void at 3AM and noticed it was difficult for him to urinate at first, but when he was able to void, he saw a lot of mikayla blood. Patient had 5 episodes of mikayla blood without clots along with dysuria. Denies trauma, falls, penile lesion. Per chart review, patient was admitted to [facility] on 3/30/22 for a rectal bleed and was transfused 4-5 units of pRBC and transferred to [facility] for further workup after a bleeding scan showed a jejunal bleed. During this time, Eliquis was held. Capsule endoscopy revealed possible small bleeding site. Pt's Eliquis was resumed on 4/8 with no reoccurrence of a GIB. Pt last took Eliquis this AM.     Admits to HA, dizziness, nausea, decreased PO intake. Denies fever/chills, chest pain, palpitations, vomiting, constipation, diarrhea, hematochezia.     ED course:   Vitals:  BP:106/71      HR: 61     Temp: 97.2      RR:18     O2: 96%  Labs significant for: H/H 9.4/28.1, PT 21.7, INR 1.84, PTT 45.8, BUN/Cr 31/2.3, glucose 116  EKG: sinus bradycardia with 1st degree AV block, LVH with QRS widening and repolarization abnormality (13 Apr 2022 11:54)    INTERVAL HPI:  4/14/22: Pt seen and examined at bedside. Complaining of 9/10 lower abdominal pain, nausea, as well as lots of mikayla blood loss from the penis. Bladder scan revealed >400cc of urine, pt attempted to use the bathroom but was actively bleeding. Urology Dr. Kiser spoke to the surgical PA, irrigated bladder and removed a copious amount of clots. CBI was ordered. Pt also complaining of b/l knee pain & headache. Denies fever/chills, chest pain, palpitations, vomiting/ constipation/ diarrhea/ hematochezia. Hgb dropped from 9.4 to 8.4, but otherwise hemodynamically stable.   04/15/22 Patient seen and examined at bedside. Patient states mild discomfort in pelvic area.  Hb dropped from 8.4 to 8.1 this AM , Low stable H/H   04/16/22 Patient seen and examined at bedside. Patient states that did not sleep well last night due to CBI leak. Today will stop CBI per uro recommendation. H/H low stable, mild pink urine in mckinnon   04/17/22 Patient seen and examined at bedside. Patient states that he has no complaints at this time. Denies chest pain, abdominal pain, n/v/c/d, fevers/chills. CBI titrated as needed until hematuria resolves. Draining light pink urine. H/H low but stable at 8.4. Constipation +  04/18/22 Patient seen and examined at bedside. Pt states he has mild lower abdominal pain and Left arm pain (tender to palpation). CBI discontinued today, TOV today per uro recs. Mckinnon draining clear urine. H/H low but stable at 8.4 this AM. Remains constipated.     OVERNIGHT EVENTS:  SBP in 150s-160    Home Medications:  acetaminophen 325 mg oral tablet: 2 tab(s) orally every 6 hours, As needed, Temp greater or equal to 38C (100.4F), Mild Pain (1 - 3) (14 Apr 2022 16:23)  aspirin 81 mg oral tablet: 1 tab(s) orally once a day (13 Apr 2022 15:41)  budesonide-formoterol 80 mcg-4.5 mcg/inh inhalation aerosol: 2 puff(s) inhaled 2 times a day (13 Apr 2022 15:41)  cloNIDine 0.2 mg oral tablet: 1 tab(s) orally 2 times a day (13 Apr 2022 15:41)  diclofenac 1% topical gel: Apply topically to affected area 3 times a day (13 Apr 2022 15:41)  doxazosin 4 mg oral tablet: 1 tab(s) orally 2 times a day (13 Apr 2022 15:41)  famotidine 40 mg oral tablet: 1 tab(s) orally 2 times a day (13 Apr 2022 15:41)  isosorbide mononitrate 30 mg oral tablet, extended release: 1 tab(s) orally once a day (in the morning) (13 Apr 2022 15:41)  lactobacillus acidophilus oral capsule: 1 cap(s) orally 2 times a day (13 Apr 2022 15:41)  lamoTRIgine 100 mg oral tablet: 1 tab(s) orally once a day (13 Apr 2022 15:41)  metFORMIN 500 mg oral tablet: 1 tab(s) orally 2 times a day (13 Apr 2022 15:41)  oxybutynin 15 mg/24 hr oral tablet, extended release: 1 tab(s) orally once a day (13 Apr 2022 15:41)  potassium chloride 20 mEq oral tablet, extended release: 1 tab(s) orally once a day (13 Apr 2022 15:41)  Vitamin B-12 1000 mcg oral tablet: 1 tab(s) orally once a day (13 Apr 2022 15:41)      MEDICATIONS  (STANDING):  amLODIPine   Tablet 10 milliGRAM(s) Oral daily  atorvastatin 10 milliGRAM(s) Oral at bedtime  budesonide  80 MICROgram(s)/formoterol 4.5 MICROgram(s) Inhaler 2 Puff(s) Inhalation two times a day  carvedilol 3.125 milliGRAM(s) Oral every 12 hours  cloNIDine 0.2 milliGRAM(s) Oral two times a day  cyanocobalamin 1000 MICROGram(s) Oral daily  dextrose 5%. 1000 milliLiter(s) (50 mL/Hr) IV Continuous <Continuous>  dextrose 5%. 1000 milliLiter(s) (100 mL/Hr) IV Continuous <Continuous>  dextrose 50% Injectable 25 Gram(s) IV Push once  dextrose 50% Injectable 12.5 Gram(s) IV Push once  dextrose 50% Injectable 25 Gram(s) IV Push once  doxazosin Oral Tab/Cap - Peds 4 milliGRAM(s) Oral two times a day  famotidine    Tablet 40 milliGRAM(s) Oral two times a day  folic acid 1 milliGRAM(s) Oral daily  furosemide    Tablet 40 milliGRAM(s) Oral daily  glucagon  Injectable 1 milliGRAM(s) IntraMuscular once  hydrALAZINE 100 milliGRAM(s) Oral three times a day  insulin lispro (ADMELOG) corrective regimen sliding scale   SubCutaneous three times a day before meals  insulin lispro (ADMELOG) corrective regimen sliding scale   SubCutaneous at bedtime  isosorbide   mononitrate ER Tablet (IMDUR) 60 milliGRAM(s) Oral daily  lactobacillus acidophilus 1 Tablet(s) Oral two times a day  lamoTRIgine 100 milliGRAM(s) Oral daily  pantoprazole    Tablet 40 milliGRAM(s) Oral before breakfast  polyethylene glycol 3350 17 Gram(s) Oral daily  saline laxative (FLEET) Rectal Enema 1 Enema Rectal once  senna 2 Tablet(s) Oral at bedtime  venlafaxine XR. 150 milliGRAM(s) Oral daily    MEDICATIONS  (PRN):  acetaminophen     Tablet .. 650 milliGRAM(s) Oral every 6 hours PRN Temp greater or equal to 38C (100.4F), Mild Pain (1 - 3)  bisacodyl Suppository 10 milliGRAM(s) Rectal daily PRN Constipation  dextrose Oral Gel 15 Gram(s) Oral once PRN Blood Glucose LESS THAN 70 milliGRAM(s)/deciliter  ondansetron Injectable 4 milliGRAM(s) IV Push every 8 hours PRN Nausea and/or Vomiting  traMADol 50 milliGRAM(s) Oral every 8 hours PRN Moderate Pain (4 - 6)      Allergies    No Known Allergies    Intolerances        Social History:  Tobacco: quit 45 years ago, former 10 pack years smoker   EtOH: denies  recreational drug use: denies  Lives with: alone  Ambulates:  independent  ADLs: independent  Occupation: retired  Vaccinations: Moderna x2 (13 Apr 2022 11:54)      REVIEW OF SYSTEMS:  CONSTITUTIONAL: No fever, No chills, No fatigue, No myalgia, No Body ache, No Weakness  EYES: No eye pain,  No visual disturbances, No discharge, NO Redness  ENMT:  No ear pain, No nose bleed, No vertigo; No sinus pain, NO throat pain, No Congestion  NECK: No pain, No stiffness  RESPIRATORY: No cough, NO wheezing, No  hemoptysis, NO  shortness of breath  CARDIOVASCULAR: No chest pain, palpitations  GASTROINTESTINAL: Admits to mild abdominal pain and constipation, NO epigastric pain. No nausea, No vomiting; No diarrhea,  [  ] BM  GENITOURINARY: Admits to mckinnon, No dysuria, No frequency, No urgency, No hematuria, NO incontinence  NEUROLOGICAL: Admits to headaches, No dizziness, No numbness, No tingling, No tremors, No weakness  EXT: No Swelling, No Pain, No Edema  SKIN:  [ x ] No itching, burning, rashes, or lesions   MUSCULOSKELETAL: Admits to left arm pain, No joint pain ,No Jt swelling; No back pain  PSYCHIATRIC: No depression,  No anxiety,  No mood swings ,No difficulty sleeping at night  PAIN SCALE: [  ] None  [ x ] Other-mild abd pain, mild arm pain   ROS Unable to obtain due to - [  ] Dementia  [  ] Lethargy [  ] Drowsy [  ] Sedated [  ] non verbal  REST OF REVIEW Of SYSTEM - [ x ] Normal     Vital Signs Last 24 Hrs  T(C): 36.6 (18 Apr 2022 05:20), Max: 36.8 (17 Apr 2022 20:21)  T(F): 97.9 (18 Apr 2022 05:20), Max: 98.2 (17 Apr 2022 20:21)  HR: 71 (18 Apr 2022 05:20) (68 - 75)  BP: 160/63 (18 Apr 2022 05:20) (114/71 - 160/63)  BP(mean): --  RR: 17 (18 Apr 2022 05:20) (17 - 17)  SpO2: 95% (18 Apr 2022 05:20) (95% - 96%)  Finger Stick        04-17 @ 07:01  -  04-18 @ 07:00  --------------------------------------------------------  IN: 6500 mL / OUT: 8330 mL / NET: -1830 mL        PHYSICAL EXAM:  GENERAL:  [ x ] NAD , [ x ] well appearing, [  ] Agitated, [  ] Drowsy,  [  ] Lethargy, [  ] confused   HEAD:  [ x ] Normal, [  ] Other  EYES:  [  ] EOMI, [  ] PERRLA, [ x ] conjunctiva and sclera clear normal, [  ] Other,  [  ] Pallor,[  ] Discharge  ENMT:  [ x ] Normal, [ x ] Moist mucous membranes, [  ] Good dentition, [ x ] No Thrush  NECK:  [x  ] Supple, [ x ] No JVD, [ x ] Normal thyroid, [  ] Lymphadenopathy [  ] Other  CHEST/LUNG:  [x  ] Clear to auscultation bilaterally, [ x ] Breath Sounds equal B/L , [  ] poor effort  [ x ] No rales, [ x ] No rhonchi  [x  ]  No wheezing,   HEART:  [ x ] Regular rate and rhythm, [  ] tachycardia, [  ] Bradycardia,  [  ] irregular  [ x ] No murmurs, No rubs, No gallops, [ x ] PPM in place (Mfr:  )  ABDOMEN:  [ x ] Soft, [  ] Nontender-lower abd tender to palpation, [x  ] Nondistended, [ x ] No mass, [ x ] Bowel sounds present, [  ] obese  NERVOUS SYSTEM:  [ x ] Alert & Oriented X3, [ x ] Nonfocal  [  ] Confusion  [  ] Encephalopathic [  ] Sedated [  ] Unable to assess, [  ] Dementia [  ] Other-  EXTREMITIES: LUE tender to palpation [ x ] 2+ Peripheral Pulses, No clubbing, No cyanosis,  [  ] edema B/L lower EXT. [  ] PVD stasis skin changes B/L Lower EXT, [  ] wound  LYMPH: No lymphadenopathy noted  SKIN:  [ x ] No rashes or lesions, [  ] Pressure Ulcers, [  ] ecchymosis, [  ] Skin Tears, [  ] Other    DIET: Diet, DASH/TLC:   Sodium & Cholesterol Restricted (04-13-22 @ 12:27)      LABS:                        8.4    7.50  )-----------( 241      ( 18 Apr 2022 08:57 )             25.6     18 Apr 2022 08:57    140    |  106    |  28     ----------------------------<  104    3.4     |  25     |  1.80     Ca    9.0        18 Apr 2022 08:57            Culture Results:   Culture grew 3 or more types of organisms which indicate  collection contamination; consider recollection only if clinically  indicated. (04-14 @ 00:48)                  Culture - Urine (collected 14 Apr 2022 00:48)  Source: Clean Catch Clean Catch (Midstream)  Final Report (15 Apr 2022 10:43):    Culture grew 3 or more types of organisms which indicate    collection contamination; consider recollection only if clinically    indicated.         Anemia Panel:      Thyroid Panel:                RADIOLOGY & ADDITIONAL TESTS:      HEALTH ISSUES - PROBLEM Dx:  Hematuria    Afib    GIB (gastrointestinal bleeding)    Anemia    CAD (coronary artery disease)    Acute kidney injury superimposed on CKD    Chronic CHF    HTN (hypertension)    HLD (hyperlipidemia)    DM (diabetes mellitus)    BPH (benign prostatic hyperplasia)    Anxiety and depression    DVT prophylaxis            Consultant(s) Notes Reviewed:  [ x ] YES     Care Discussed with [X] Consultants  [  ] Patient  [  ] Family [  ] HCP [  ]   [  ] Social Service  [  ] RN, [  ] Physical Therapy,[  ] Palliative care team  DVT PPX: [  ] Lovenox, [  ] S C Heparin, [  ] Coumadin, [  ] Xarelto, [  ] Eliquis, [  ] Pradaxa, [  ] IV Heparin drip, [  ] SCD [  ] Contraindication 2 to GI Bleed,[  ] Ambulation [ x ] Contraindicated 2 to  bleed [  ] Contraindicated 2 to Brain Bleed  Advanced directive: [x  ] None, [  ] DNR/DNI Patient is a 77y old  Male who presents with a chief complaint of Hematuria (18 Apr 2022 09:41)    HPI:  76yo M with PMH of Afib (on eliquis), MGUS, depression, CAD s/p stents, HLD, HTN, CKD, DM2, diverticulosis, Anemia , Diastolic  CHF (Echo 1/22 LVEF 55%) and BPH with recent GI Bleed 4/22 with multiple pRBC transfusions recently d/chio from [facility] 4/11/22 presents to the ED with hematuria. Patient states he went to void at 3AM and noticed it was difficult for him to urinate at first, but when he was able to void, he saw a lot of mikayla blood. Patient had 5 episodes of mikayla blood without clots along with dysuria. Denies trauma, falls, penile lesion. Per chart review, patient was admitted to [facility] on 3/30/22 for a rectal bleed and was transfused 4-5 units of pRBC and transferred to [facility] for further workup after a bleeding scan showed a jejunal bleed. During this time, Eliquis was held. Capsule endoscopy revealed possible small bleeding site. Pt's Eliquis was resumed on 4/8 with no reoccurrence of a GIB. Pt last took Eliquis this AM.     Admits to HA, dizziness, nausea, decreased PO intake. Denies fever/chills, chest pain, palpitations, vomiting, constipation, diarrhea, hematochezia.     ED course:   Vitals:  BP:106/71      HR: 61     Temp: 97.2      RR:18     O2: 96%  Labs significant for: H/H 9.4/28.1, PT 21.7, INR 1.84, PTT 45.8, BUN/Cr 31/2.3, glucose 116  EKG: sinus bradycardia with 1st degree AV block, LVH with QRS widening and repolarization abnormality (13 Apr 2022 11:54)    INTERVAL HPI:  4/14/22: Pt seen and examined at bedside. Complaining of 9/10 lower abdominal pain, nausea, as well as lots of mikayla blood loss from the penis. Bladder scan revealed >400cc of urine, pt attempted to use the bathroom but was actively bleeding. Urology Dr. Kiser spoke to the surgical PA, irrigated bladder and removed a copious amount of clots. CBI was ordered. Pt also complaining of b/l knee pain & headache. Denies fever/chills, chest pain, palpitations, vomiting/ constipation/ diarrhea/ hematochezia. Hgb dropped from 9.4 to 8.4, but otherwise hemodynamically stable.   04/15/22 Patient seen and examined at bedside. Patient states mild discomfort in pelvic area.  Hb dropped from 8.4 to 8.1 this AM , Low stable H/H   04/16/22 Patient seen and examined at bedside. Patient states that did not sleep well last night due to CBI leak. Today will stop CBI per uro recommendation. H/H low stable, mild pink urine in mckinnon   04/17/22 Patient seen and examined at bedside. Patient states that he has no complaints at this time. Denies chest pain, abdominal pain, n/v/c/d, fevers/chills. CBI titrated as needed until hematuria resolves. Draining light pink urine. H/H low but stable at 8.4. Constipation +  04/18/22 Patient seen and examined at bedside. Pt states he has mild lower abdominal pain and Left arm pain (tender to palpation). CBI discontinued today, TOV today per uro recs. Mckinnon draining clear urine. H/H low but stable at 8.4 this AM. Remains constipated. + BM today TOV today as per Urology    OVERNIGHT EVENTS:  SBP in 150s-160    Home Medications:  acetaminophen 325 mg oral tablet: 2 tab(s) orally every 6 hours, As needed, Temp greater or equal to 38C (100.4F), Mild Pain (1 - 3) (14 Apr 2022 16:23)  aspirin 81 mg oral tablet: 1 tab(s) orally once a day (13 Apr 2022 15:41)  budesonide-formoterol 80 mcg-4.5 mcg/inh inhalation aerosol: 2 puff(s) inhaled 2 times a day (13 Apr 2022 15:41)  cloNIDine 0.2 mg oral tablet: 1 tab(s) orally 2 times a day (13 Apr 2022 15:41)  diclofenac 1% topical gel: Apply topically to affected area 3 times a day (13 Apr 2022 15:41)  doxazosin 4 mg oral tablet: 1 tab(s) orally 2 times a day (13 Apr 2022 15:41)  famotidine 40 mg oral tablet: 1 tab(s) orally 2 times a day (13 Apr 2022 15:41)  isosorbide mononitrate 30 mg oral tablet, extended release: 1 tab(s) orally once a day (in the morning) (13 Apr 2022 15:41)  lactobacillus acidophilus oral capsule: 1 cap(s) orally 2 times a day (13 Apr 2022 15:41)  lamoTRIgine 100 mg oral tablet: 1 tab(s) orally once a day (13 Apr 2022 15:41)  metFORMIN 500 mg oral tablet: 1 tab(s) orally 2 times a day (13 Apr 2022 15:41)  oxybutynin 15 mg/24 hr oral tablet, extended release: 1 tab(s) orally once a day (13 Apr 2022 15:41)  potassium chloride 20 mEq oral tablet, extended release: 1 tab(s) orally once a day (13 Apr 2022 15:41)  Vitamin B-12 1000 mcg oral tablet: 1 tab(s) orally once a day (13 Apr 2022 15:41)      MEDICATIONS  (STANDING):  amLODIPine   Tablet 10 milliGRAM(s) Oral daily  atorvastatin 10 milliGRAM(s) Oral at bedtime  budesonide  80 MICROgram(s)/formoterol 4.5 MICROgram(s) Inhaler 2 Puff(s) Inhalation two times a day  carvedilol 3.125 milliGRAM(s) Oral every 12 hours  cloNIDine 0.2 milliGRAM(s) Oral two times a day  cyanocobalamin 1000 MICROGram(s) Oral daily  dextrose 5%. 1000 milliLiter(s) (50 mL/Hr) IV Continuous <Continuous>  dextrose 5%. 1000 milliLiter(s) (100 mL/Hr) IV Continuous <Continuous>  dextrose 50% Injectable 25 Gram(s) IV Push once  dextrose 50% Injectable 12.5 Gram(s) IV Push once  dextrose 50% Injectable 25 Gram(s) IV Push once  doxazosin Oral Tab/Cap - Peds 4 milliGRAM(s) Oral two times a day  famotidine    Tablet 40 milliGRAM(s) Oral two times a day  folic acid 1 milliGRAM(s) Oral daily  furosemide    Tablet 40 milliGRAM(s) Oral daily  glucagon  Injectable 1 milliGRAM(s) IntraMuscular once  hydrALAZINE 100 milliGRAM(s) Oral three times a day  insulin lispro (ADMELOG) corrective regimen sliding scale   SubCutaneous three times a day before meals  insulin lispro (ADMELOG) corrective regimen sliding scale   SubCutaneous at bedtime  isosorbide   mononitrate ER Tablet (IMDUR) 60 milliGRAM(s) Oral daily  lactobacillus acidophilus 1 Tablet(s) Oral two times a day  lamoTRIgine 100 milliGRAM(s) Oral daily  pantoprazole    Tablet 40 milliGRAM(s) Oral before breakfast  polyethylene glycol 3350 17 Gram(s) Oral daily  saline laxative (FLEET) Rectal Enema 1 Enema Rectal once  senna 2 Tablet(s) Oral at bedtime  venlafaxine XR. 150 milliGRAM(s) Oral daily    MEDICATIONS  (PRN):  acetaminophen     Tablet .. 650 milliGRAM(s) Oral every 6 hours PRN Temp greater or equal to 38C (100.4F), Mild Pain (1 - 3)  bisacodyl Suppository 10 milliGRAM(s) Rectal daily PRN Constipation  dextrose Oral Gel 15 Gram(s) Oral once PRN Blood Glucose LESS THAN 70 milliGRAM(s)/deciliter  ondansetron Injectable 4 milliGRAM(s) IV Push every 8 hours PRN Nausea and/or Vomiting  traMADol 50 milliGRAM(s) Oral every 8 hours PRN Moderate Pain (4 - 6)      Allergies    No Known Allergies    Intolerances        Social History:  Tobacco: quit 45 years ago, former 10 pack years smoker   EtOH: denies  recreational drug use: denies  Lives with: alone  Ambulates:  independent  ADLs: independent  Occupation: retired  Vaccinations: Moderna x2 (13 Apr 2022 11:54)      REVIEW OF SYSTEMS:  CONSTITUTIONAL: No fever, No chills, No fatigue, No myalgia, No Body ache, No Weakness  EYES: No eye pain,  No visual disturbances, No discharge, NO Redness  ENMT:  No ear pain, No nose bleed, No vertigo; No sinus pain, NO throat pain, No Congestion  NECK: No pain, No stiffness  RESPIRATORY: No cough, NO wheezing, No  hemoptysis, NO  shortness of breath  CARDIOVASCULAR: No chest pain, palpitations  GASTROINTESTINAL: Admits to mild abdominal pain and constipation, NO epigastric pain. No nausea, No vomiting; No diarrhea,  [ x ] BM  GENITOURINARY: Admits to mckinnon, No dysuria, No frequency, No urgency, No hematuria, NO incontinence  NEUROLOGICAL: Admits to headaches, No dizziness, No numbness, No tingling, No tremors, No weakness  EXT: No Swelling, No Pain, No Edema  SKIN:  [ x ] No itching, burning, rashes, or lesions   MUSCULOSKELETAL: Admits to left arm pain, No joint pain ,No Jt swelling; No back pain  PSYCHIATRIC: No depression,  No anxiety,  No mood swings ,No difficulty sleeping at night  PAIN SCALE: [x  ] None  [  ] Other-NONE  ROS Unable to obtain due to - [  ] Dementia  [  ] Lethargy [  ] Drowsy [  ] Sedated [  ] non verbal  REST OF REVIEW Of SYSTEM - [ x ] Normal     Vital Signs Last 24 Hrs  T(C): 36.6 (18 Apr 2022 05:20), Max: 36.8 (17 Apr 2022 20:21)  T(F): 97.9 (18 Apr 2022 05:20), Max: 98.2 (17 Apr 2022 20:21)  HR: 71 (18 Apr 2022 05:20) (68 - 75)  BP: 160/63 (18 Apr 2022 05:20) (114/71 - 160/63)  BP(mean): --  RR: 17 (18 Apr 2022 05:20) (17 - 17)  SpO2: 95% (18 Apr 2022 05:20) (95% - 96%)  Finger Stick        04-17 @ 07:01  -  04-18 @ 07:00  --------------------------------------------------------  IN: 6500 mL / OUT: 8330 mL / NET: -1830 mL        PHYSICAL EXAM:  GENERAL:  [ x ] NAD , [ x ] well appearing, [  ] Agitated, [  ] Drowsy,  [  ] Lethargy, [  ] confused   HEAD:  [ x ] Normal, [  ] Other  EYES:  [  ] EOMI, [  ] PERRLA, [ x ] conjunctiva and sclera clear normal, [  ] Other,  [  ] Pallor,[  ] Discharge  ENMT:  [ x ] Normal, [ x ] Moist mucous membranes, [ x ] Good dentition, [ x ] No Thrush  NECK:  [x  ] Supple, [ x ] No JVD, [ x ] Normal thyroid, [  ] Lymphadenopathy [  ] Other  CHEST/LUNG:  [x  ] Clear to auscultation bilaterally, [ x ] Breath Sounds equal B/L , [  ] poor effort  [ x ] No rales, [ x ] No rhonchi  [x  ]  No wheezing,   HEART:  [  ] Regular rate and rhythm, [  ] tachycardia, [  ] Bradycardia,  [x  ] irregular  [ x ] No murmurs, No rubs, No gallops, [ x ] PPM in place (Mfr:  )  ABDOMEN:  [ x ] Soft, [x  ] Nontender- [x  ] Nondistended, [ x ] No mass, [ x ] Bowel sounds present, [ x ] obese  NERVOUS SYSTEM:  [ x ] Alert & Oriented X3, [ x ] Nonfocal  [  ] Confusion  [  ] Encephalopathic [  ] Sedated [  ] Unable to assess, [  ] Dementia [  ] Other-  EXTREMITIES: LUE tender to palpation [ x ] 2+ Peripheral Pulses, No clubbing, No cyanosis,  [  ] edema B/L lower EXT. [  ] PVD stasis skin changes B/L Lower EXT, [  ] wound  LYMPH: No lymphadenopathy noted  SKIN:  [ x ] No rashes or lesions, [  ] Pressure Ulcers, [  ] ecchymosis, [  ] Skin Tears, [  ] Other    DIET: Diet, DASH/TLC:   Sodium & Cholesterol Restricted (04-13-22 @ 12:27)      LABS:                        8.4    7.50  )-----------( 241      ( 18 Apr 2022 08:57 )             25.6     18 Apr 2022 08:57    140    |  106    |  28     ----------------------------<  104    3.4     |  25     |  1.80     Ca    9.0        18 Apr 2022 08:57    Culture Results:   Culture grew 3 or more types of organisms which indicate  collection contamination; consider recollection only if clinically  indicated. (04-14 @ 00:48)    Culture - Urine (collected 14 Apr 2022 00:48)  Source: Clean Catch Clean Catch (Midstream)  Final Report (15 Apr 2022 10:43):    Culture grew 3 or more types of organisms which indicate    collection contamination; consider recollection only if clinically    indicated.         RADIOLOGY & ADDITIONAL TESTS: NONE      HEALTH ISSUES - PROBLEM Dx:  Hematuria    Afib    GIB (gastrointestinal bleeding)    Anemia    CAD (coronary artery disease)    Acute kidney injury superimposed on CKD    Chronic CHF    HTN (hypertension)    HLD (hyperlipidemia)    DM (diabetes mellitus)    BPH (benign prostatic hyperplasia)    Anxiety and depression    DVT prophylaxis            Consultant(s) Notes Reviewed:  [ x ] YES     Care Discussed with [X] Consultants  [ x ] Patient  [  ] Family [  ] HCP [  ]   [  ] Social Service  [ x ] RN, [  ] Physical Therapy,[  ] Palliative care team  DVT PPX: [  ] Lovenox, [  ] S C Heparin, [  ] Coumadin, [  ] Xarelto, [  ] Eliquis, [  ] Pradaxa, [  ] IV Heparin drip, [  ] SCD [  ] Contraindication 2 to GI Bleed,[  ] Ambulation [ x ] Contraindicated 2 to  bleed [  ] Contraindicated 2 to Brain Bleed  Advanced directive: [x  ] None, [  ] DNR/DNI

## 2022-04-19 ENCOUNTER — TRANSCRIPTION ENCOUNTER (OUTPATIENT)
Age: 77
End: 2022-04-19

## 2022-04-19 VITALS
SYSTOLIC BLOOD PRESSURE: 151 MMHG | TEMPERATURE: 98 F | DIASTOLIC BLOOD PRESSURE: 69 MMHG | RESPIRATION RATE: 20 BRPM | OXYGEN SATURATION: 94 % | HEART RATE: 70 BPM

## 2022-04-19 LAB
ANION GAP SERPL CALC-SCNC: 10 MMOL/L — SIGNIFICANT CHANGE UP (ref 5–17)
BUN SERPL-MCNC: 29 MG/DL — HIGH (ref 7–23)
CALCIUM SERPL-MCNC: 9.4 MG/DL — SIGNIFICANT CHANGE UP (ref 8.5–10.1)
CHLORIDE SERPL-SCNC: 104 MMOL/L — SIGNIFICANT CHANGE UP (ref 96–108)
CO2 SERPL-SCNC: 23 MMOL/L — SIGNIFICANT CHANGE UP (ref 22–31)
CREAT SERPL-MCNC: 1.9 MG/DL — HIGH (ref 0.5–1.3)
EGFR: 36 ML/MIN/1.73M2 — LOW
GLUCOSE SERPL-MCNC: 109 MG/DL — HIGH (ref 70–99)
HCT VFR BLD CALC: 27.4 % — LOW (ref 39–50)
HGB BLD-MCNC: 8.9 G/DL — LOW (ref 13–17)
MAGNESIUM SERPL-MCNC: 2.4 MG/DL — SIGNIFICANT CHANGE UP (ref 1.6–2.6)
MCHC RBC-ENTMCNC: 29.4 PG — SIGNIFICANT CHANGE UP (ref 27–34)
MCHC RBC-ENTMCNC: 32.5 GM/DL — SIGNIFICANT CHANGE UP (ref 32–36)
MCV RBC AUTO: 90.4 FL — SIGNIFICANT CHANGE UP (ref 80–100)
NRBC # BLD: 0 /100 WBCS — SIGNIFICANT CHANGE UP (ref 0–0)
PLATELET # BLD AUTO: 237 K/UL — SIGNIFICANT CHANGE UP (ref 150–400)
POTASSIUM SERPL-MCNC: 3.9 MMOL/L — SIGNIFICANT CHANGE UP (ref 3.5–5.3)
POTASSIUM SERPL-SCNC: 3.9 MMOL/L — SIGNIFICANT CHANGE UP (ref 3.5–5.3)
RBC # BLD: 3.03 M/UL — LOW (ref 4.2–5.8)
RBC # FLD: 16.1 % — HIGH (ref 10.3–14.5)
SARS-COV-2 RNA SPEC QL NAA+PROBE: SIGNIFICANT CHANGE UP
SODIUM SERPL-SCNC: 137 MMOL/L — SIGNIFICANT CHANGE UP (ref 135–145)
WBC # BLD: 9 K/UL — SIGNIFICANT CHANGE UP (ref 3.8–10.5)
WBC # FLD AUTO: 9 K/UL — SIGNIFICANT CHANGE UP (ref 3.8–10.5)

## 2022-04-19 PROCEDURE — 80048 BASIC METABOLIC PNL TOTAL CA: CPT

## 2022-04-19 PROCEDURE — 88108 CYTOPATH CONCENTRATE TECH: CPT

## 2022-04-19 PROCEDURE — 85018 HEMOGLOBIN: CPT

## 2022-04-19 PROCEDURE — 36415 COLL VENOUS BLD VENIPUNCTURE: CPT

## 2022-04-19 PROCEDURE — 85025 COMPLETE CBC W/AUTO DIFF WBC: CPT

## 2022-04-19 PROCEDURE — 97116 GAIT TRAINING THERAPY: CPT

## 2022-04-19 PROCEDURE — 99232 SBSQ HOSP IP/OBS MODERATE 35: CPT

## 2022-04-19 PROCEDURE — 85610 PROTHROMBIN TIME: CPT

## 2022-04-19 PROCEDURE — 94664 DEMO&/EVAL PT USE INHALER: CPT

## 2022-04-19 PROCEDURE — 86901 BLOOD TYPING SEROLOGIC RH(D): CPT

## 2022-04-19 PROCEDURE — 84100 ASSAY OF PHOSPHORUS: CPT

## 2022-04-19 PROCEDURE — 80053 COMPREHEN METABOLIC PANEL: CPT

## 2022-04-19 PROCEDURE — 86900 BLOOD TYPING SEROLOGIC ABO: CPT

## 2022-04-19 PROCEDURE — 94640 AIRWAY INHALATION TREATMENT: CPT

## 2022-04-19 PROCEDURE — 85730 THROMBOPLASTIN TIME PARTIAL: CPT

## 2022-04-19 PROCEDURE — 85014 HEMATOCRIT: CPT

## 2022-04-19 PROCEDURE — 86850 RBC ANTIBODY SCREEN: CPT

## 2022-04-19 PROCEDURE — 84300 ASSAY OF URINE SODIUM: CPT

## 2022-04-19 PROCEDURE — 82962 GLUCOSE BLOOD TEST: CPT

## 2022-04-19 PROCEDURE — 82570 ASSAY OF URINE CREATININE: CPT

## 2022-04-19 PROCEDURE — 83735 ASSAY OF MAGNESIUM: CPT

## 2022-04-19 PROCEDURE — 97161 PT EVAL LOW COMPLEX 20 MIN: CPT

## 2022-04-19 PROCEDURE — 88305 TISSUE EXAM BY PATHOLOGIST: CPT

## 2022-04-19 PROCEDURE — 93005 ELECTROCARDIOGRAM TRACING: CPT

## 2022-04-19 PROCEDURE — 87086 URINE CULTURE/COLONY COUNT: CPT

## 2022-04-19 PROCEDURE — 85027 COMPLETE CBC AUTOMATED: CPT

## 2022-04-19 PROCEDURE — 87635 SARS-COV-2 COVID-19 AMP PRB: CPT

## 2022-04-19 PROCEDURE — 83935 ASSAY OF URINE OSMOLALITY: CPT

## 2022-04-19 PROCEDURE — 99285 EMERGENCY DEPT VISIT HI MDM: CPT | Mod: 25

## 2022-04-19 RX ORDER — POTASSIUM CHLORIDE 20 MEQ
1 PACKET (EA) ORAL
Qty: 0 | Refills: 0 | DISCHARGE

## 2022-04-19 RX ORDER — POLYETHYLENE GLYCOL 3350 17 G/17G
17 POWDER, FOR SOLUTION ORAL
Qty: 510 | Refills: 0
Start: 2022-04-19 | End: 2022-05-18

## 2022-04-19 RX ORDER — ATORVASTATIN CALCIUM 80 MG/1
1 TABLET, FILM COATED ORAL
Qty: 0 | Refills: 0 | DISCHARGE
Start: 2022-04-19

## 2022-04-19 RX ORDER — ISOSORBIDE MONONITRATE 60 MG/1
1 TABLET, EXTENDED RELEASE ORAL
Qty: 21 | Refills: 0
Start: 2022-04-19 | End: 2022-05-09

## 2022-04-19 RX ORDER — ASPIRIN/CALCIUM CARB/MAGNESIUM 324 MG
1 TABLET ORAL
Qty: 0 | Refills: 0 | DISCHARGE

## 2022-04-19 RX ORDER — SENNA PLUS 8.6 MG/1
2 TABLET ORAL
Qty: 30 | Refills: 0
Start: 2022-04-19 | End: 2022-05-03

## 2022-04-19 RX ORDER — METFORMIN HYDROCHLORIDE 850 MG/1
1 TABLET ORAL
Qty: 0 | Refills: 0 | DISCHARGE

## 2022-04-19 RX ORDER — ISOSORBIDE MONONITRATE 60 MG/1
1 TABLET, EXTENDED RELEASE ORAL
Qty: 30 | Refills: 0
Start: 2022-04-19 | End: 2022-05-18

## 2022-04-19 RX ORDER — ISOSORBIDE MONONITRATE 60 MG/1
1 TABLET, EXTENDED RELEASE ORAL
Qty: 0 | Refills: 0 | DISCHARGE

## 2022-04-19 RX ADMIN — Medication 0.2 MILLIGRAM(S): at 06:59

## 2022-04-19 RX ADMIN — PANTOPRAZOLE SODIUM 40 MILLIGRAM(S): 20 TABLET, DELAYED RELEASE ORAL at 05:53

## 2022-04-19 RX ADMIN — Medication 1 TABLET(S): at 05:48

## 2022-04-19 RX ADMIN — PREGABALIN 1000 MICROGRAM(S): 225 CAPSULE ORAL at 11:51

## 2022-04-19 RX ADMIN — LAMOTRIGINE 100 MILLIGRAM(S): 25 TABLET, ORALLY DISINTEGRATING ORAL at 11:51

## 2022-04-19 RX ADMIN — Medication 650 MILLIGRAM(S): at 02:33

## 2022-04-19 RX ADMIN — Medication 150 MILLIGRAM(S): at 11:50

## 2022-04-19 RX ADMIN — FAMOTIDINE 40 MILLIGRAM(S): 10 INJECTION INTRAVENOUS at 05:48

## 2022-04-19 RX ADMIN — Medication 4 MILLIGRAM(S): at 06:59

## 2022-04-19 RX ADMIN — ISOSORBIDE MONONITRATE 60 MILLIGRAM(S): 60 TABLET, EXTENDED RELEASE ORAL at 11:51

## 2022-04-19 RX ADMIN — Medication 20 MILLIEQUIVALENT(S): at 11:51

## 2022-04-19 RX ADMIN — Medication 1 MILLIGRAM(S): at 11:50

## 2022-04-19 RX ADMIN — Medication 100 MILLIGRAM(S): at 05:48

## 2022-04-19 RX ADMIN — Medication 40 MILLIGRAM(S): at 05:48

## 2022-04-19 RX ADMIN — Medication 100 MILLIGRAM(S): at 13:31

## 2022-04-19 RX ADMIN — AMLODIPINE BESYLATE 10 MILLIGRAM(S): 2.5 TABLET ORAL at 05:50

## 2022-04-19 NOTE — PROGRESS NOTE ADULT - PROBLEM SELECTOR PLAN 2
-Acute on Chronic Anemia   - History of GIB, no GIB during current admission  - admitted with hematuria that has now resolved as of yesterday 4/18   - Hb low stable at 8.9 this AM   - No sign or symptoms of active bleeding

## 2022-04-19 NOTE — PROGRESS NOTE ADULT - PROBLEM SELECTOR PLAN 7
Chronic, history of stents  - Continue Coreg,  Atorvastatin and Imdur dose Increase as per Cardiology+ BM    - Hold Eliquis and ASA in setting of hematuria  - Cardio DR. Bliss's group following Chronic, history of stents  - Continue Coreg,  Atorvastatin and Imdur dose Increase as per Cardiology+ BM    - Hold Eliquis and ASA in setting of hematuria  - Cardio DR. Bliss's group following  Restart ASA EC after 4 days as d/w DR Kiser , D/W Cardiology as well

## 2022-04-19 NOTE — PROGRESS NOTE ADULT - SUBJECTIVE AND OBJECTIVE BOX
[facility] -- Fifi Bliss, Kalpesh Valdovinos, Abraham Sheridan Savella, Goodger: Office # [phone]    Follow Up:  Hx of Afib on Eliquis, now with hematuria; CAD s/p stents    Subjective/Observations: Patient seen and examined, awake, alert, resting comfortably in bed, denies chest pain, dyspnea, palpitations or dizziness. Tolerating room air.     REVIEW OF SYSTEMS: All review of systems is negative for eye, ENT, GI, , allergic, dermatologic, musculoskeletal and neurologic except as described above    PAST MEDICAL & SURGICAL HISTORY:  HTN (hypertension)  c/b multiple episodes of hypertensive urgency    HLD (hyperlipidemia)    Atrial fibrillation  first documented on EKG 10/7/2021    CAD (coronary artery disease)  s/p stents (not on plavix)    Type 2 diabetes mellitus  not on home insulin/ Meds    Anxiety  Depression  multiple psych medications    History of diverticulitis  07/2021    Diverticulosis  c/b GIB in 2020    Afib  on AC    Stage 3 chronic kidney disease    Anemia of chronic disease  Monoclonal Gammopathy-MGUS  pRBC transfusion 10/15/21    Chronic diastolic congestive heart failure    Multiple thyroid nodules    H/O: upper GI bleed  4/22, small Bowel Bleed, 5 u pRBC Transfusion 4/22  S/P Colonoscopy- Diverticulosis  S/P Capsule Endoscopy done -[facility] -No active bleeding    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  Had surgery ( April 2013 )    S/P tonsillectomy    S/P arthroscopic knee surgery  Bilateral ( 2005 )    Torsion of testicle  Had surgery at age 14    Pilonidal cyst  Had surgery ( 1969 )    S/P cataract surgery  Bilateral    H/O hernia repair        MEDICATIONS  (STANDING):  amLODIPine   Tablet 10 milliGRAM(s) Oral daily  atorvastatin 10 milliGRAM(s) Oral at bedtime  budesonide  80 MICROgram(s)/formoterol 4.5 MICROgram(s) Inhaler 2 Puff(s) Inhalation two times a day  carvedilol 3.125 milliGRAM(s) Oral every 12 hours  cloNIDine 0.2 milliGRAM(s) Oral two times a day  cyanocobalamin 1000 MICROGram(s) Oral daily  dextrose 5%. 1000 milliLiter(s) (50 mL/Hr) IV Continuous <Continuous>  dextrose 5%. 1000 milliLiter(s) (100 mL/Hr) IV Continuous <Continuous>  dextrose 50% Injectable 25 Gram(s) IV Push once  dextrose 50% Injectable 12.5 Gram(s) IV Push once  dextrose 50% Injectable 25 Gram(s) IV Push once  doxazosin Oral Tab/Cap - Peds 4 milliGRAM(s) Oral two times a day  famotidine    Tablet 40 milliGRAM(s) Oral two times a day  folic acid 1 milliGRAM(s) Oral daily  furosemide    Tablet 40 milliGRAM(s) Oral daily  glucagon  Injectable 1 milliGRAM(s) IntraMuscular once  hydrALAZINE 100 milliGRAM(s) Oral three times a day  insulin lispro (ADMELOG) corrective regimen sliding scale   SubCutaneous three times a day before meals  insulin lispro (ADMELOG) corrective regimen sliding scale   SubCutaneous at bedtime  isosorbide   mononitrate ER Tablet (IMDUR) 60 milliGRAM(s) Oral daily  lactobacillus acidophilus 1 Tablet(s) Oral two times a day  lamoTRIgine 100 milliGRAM(s) Oral daily  pantoprazole    Tablet 40 milliGRAM(s) Oral before breakfast  polyethylene glycol 3350 17 Gram(s) Oral daily  potassium chloride    Tablet ER 20 milliEquivalent(s) Oral daily  senna 2 Tablet(s) Oral at bedtime  venlafaxine XR. 150 milliGRAM(s) Oral daily    MEDICATIONS  (PRN):  acetaminophen     Tablet .. 650 milliGRAM(s) Oral every 6 hours PRN Temp greater or equal to 38C (100.4F), Mild Pain (1 - 3)  bisacodyl Suppository 10 milliGRAM(s) Rectal daily PRN Constipation  dextrose Oral Gel 15 Gram(s) Oral once PRN Blood Glucose LESS THAN 70 milliGRAM(s)/deciliter  ondansetron Injectable 4 milliGRAM(s) IV Push every 8 hours PRN Nausea and/or Vomiting  traMADol 50 milliGRAM(s) Oral every 8 hours PRN Moderate Pain (4 - 6)    Allergies    No Known Allergies    Intolerances      Vital Signs Last 24 Hrs  T(C): 36.5 (19 Apr 2022 06:45), Max: 37.1 (18 Apr 2022 20:08)  T(F): 97.7 (19 Apr 2022 06:45), Max: 98.7 (18 Apr 2022 20:08)  HR: 65 (19 Apr 2022 06:45) (65 - 81)  BP: 166/75 (19 Apr 2022 06:45) (137/75 - 166/75)  BP(mean): --  RR: 18 (19 Apr 2022 06:45) (18 - 18)  SpO2: 93% (19 Apr 2022 06:45) (93% - 95%)  I&O's Summary    18 Apr 2022 07:01  -  19 Apr 2022 07:00  --------------------------------------------------------  IN: 0 mL / OUT: 125 mL / NET: -125 mL          TELE: Not on telemetry   PHYSICAL EXAM:  Appearance: NAD, no distress, alert, well developed   HEENT: Moist Mucous Membranes, Anicteric  Cardiovascular: Regular rate and rhythm, Normal S1 S2, No JVD, No murmurs, No rubs, gallops or clicks  Respiratory: Non-labored, Clear to auscultation, No rales, No rhonchi, No wheezing.   Gastrointestinal:  Soft, Non-tender, + BS  Neurologic: Non-focal  Skin: Warm and dry, No visible rashes or ulcers, No ecchymosis, No cyanosis  Musculoskeletal: No clubbing, No cyanosis, No joint swelling/tenderness  Psychiatry: Mood & affect appropriate  Lymph: No peripheral edema.     LABS: All Labs Reviewed:                        8.9    9.00  )-----------( 237      ( 19 Apr 2022 09:03 )             27.4                         8.4    7.50  )-----------( 241      ( 18 Apr 2022 08:57 )             25.6                         8.4    5.81  )-----------( 241      ( 17 Apr 2022 09:13 )             25.8     19 Apr 2022 09:03    137    |  104    |  29     ----------------------------<  109    3.9     |  23     |  1.90   18 Apr 2022 08:57    140    |  106    |  28     ----------------------------<  104    3.4     |  25     |  1.80   17 Apr 2022 09:13    142    |  108    |  22     ----------------------------<  107    4.0     |  28     |  1.80     Ca    9.4        19 Apr 2022 09:03  Ca    9.0        18 Apr 2022 08:57  Ca    9.5        17 Apr 2022 09:13  Mg     2.4       19 Apr 2022 09:03                      12 Lead ECG:   Ventricular Rate 52 BPM    Atrial Rate 52 BPM    P-R Interval 348 ms    QRS Duration 116 ms    Q-T Interval 496 ms    QTC Calculation(Bazett) 461 ms    P Axis 49 degrees    R Axis -11 degrees    T Axis 179 degrees    Diagnosis Line Sinus bradycardia with 1st degree AV block  Left ventricular hypertrophy with QRS widening and repolarization abnormality ( R in aVL , [place] product )  Abnormal ECG  When compared with ECG of 30-MAR-2022 13:01,  No significant change was found  Confirmed by Bonifacio BARRERA, Victorino ([id number]) on 4/13/2022 10:48:39 AM (04-13-22 @ 09:45)    < from: TTE Echo Complete w/o Contrast w/ Doppler (01.06.22 @ 10:52) >     EXAM:  ECHO TTE WO CON COMP W DOPP         PROCEDURE DATE:  01/06/2022        INTERPRETATION:  INDICATION: Dyspnea  Sonographer PH    Blood Pressure 177/86    Height 172.7 cm     Weight 104.4 kg    Dimensions:  LA 4.2       Normal Values: 2.0 - 4.0 cm  Ao 3.7        Normal Values: 2.0 - 3.8 cm  SEPTUM 1.4       Normal Values: 0.6 - 1.2 cm  PWT 1.4       Normal Values: 0.6 - 1.1 cm  LVIDd 5.2         Normal Values: 3.0 - 5.6 cm  LVIDs 4.2         Normal Values: 1.8 - 4.0 cm      OBSERVATIONS:  Technically difficult study  Mitral Valve: Mitral annular calcification with thickened leaflets, mild   MR.  Aortic Valve/Aorta: Calcified trileaflet aortic valve with decreased   opening. Peak transaortic valve gradient is 29.4 mmHg with a mean   transaortic valve gradient 14.7 mmHg. This consistent with mild aortic   stenosis.  Tricuspid Valve: Mild TR.  Pulmonic Valve: Not well-visualized  Left Atrium: Enlarged  Right Atrium: Not well-visualized  Left Ventricle: Moderate left ventricular hypertrophy with normal   systolic function, estimated LVEF of 55%.  Right Ventricle: Grossly normal size and systolic function.  Pericardium: no significant pericardial effusion.  Pulmonary/RV Pressure: estimated PA systolic pressure of 32mmHg        IMPRESSION:  Technically difficult study  Moderate left ventricular hypertrophy with normal systolic function,   estimated LVEF of 55%.  Grossly normal RV size and systolic function.  Calcified trileaflet aortic valve with mild aortic stenosis  Mild MR andTR.  No significant pericardial effusion.    --- End of Report ---              VIET GREENWOOD MD; Attending Cardiologist  This document has been electronically signed. Jan 7 2022 11:19AM    < end of copied text >

## 2022-04-19 NOTE — PROGRESS NOTE ADULT - SUBJECTIVE AND OBJECTIVE BOX
Patient is a 77y old  Male who presents with a chief complaint of Hematuria (14 Apr 2022 10:23)    Patient seen in follow up for CKD 4.        PAST MEDICAL HISTORY:  Hypertension    Diabetes mellitus    Lupus    Hepatitis C    Anxiety and depression    CAD (coronary artery disease)    Diverticulosis    Hyperlipidemia    HTN (hypertension)    HLD (hyperlipidemia)    Atrial fibrillation    CAD (coronary artery disease)    Type 2 diabetes mellitus    Anxiety    History of diverticulitis    Diverticulosis    Afib    Stage 3 chronic kidney disease    Anemia of chronic disease    Chronic diastolic congestive heart failure    Multiple thyroid nodules    H/O: upper GI bleed      MEDICATIONS  (STANDING):  amLODIPine   Tablet 10 milliGRAM(s) Oral daily  atorvastatin 10 milliGRAM(s) Oral at bedtime  budesonide  80 MICROgram(s)/formoterol 4.5 MICROgram(s) Inhaler 2 Puff(s) Inhalation two times a day  carvedilol 3.125 milliGRAM(s) Oral every 12 hours  cloNIDine 0.2 milliGRAM(s) Oral two times a day  cyanocobalamin 1000 MICROGram(s) Oral daily  dextrose 5%. 1000 milliLiter(s) (50 mL/Hr) IV Continuous <Continuous>  dextrose 5%. 1000 milliLiter(s) (100 mL/Hr) IV Continuous <Continuous>  dextrose 50% Injectable 25 Gram(s) IV Push once  dextrose 50% Injectable 12.5 Gram(s) IV Push once  dextrose 50% Injectable 25 Gram(s) IV Push once  doxazosin Oral Tab/Cap - Peds 4 milliGRAM(s) Oral two times a day  famotidine    Tablet 40 milliGRAM(s) Oral two times a day  folic acid 1 milliGRAM(s) Oral daily  furosemide    Tablet 40 milliGRAM(s) Oral daily  glucagon  Injectable 1 milliGRAM(s) IntraMuscular once  hydrALAZINE 100 milliGRAM(s) Oral three times a day  insulin lispro (ADMELOG) corrective regimen sliding scale   SubCutaneous three times a day before meals  insulin lispro (ADMELOG) corrective regimen sliding scale   SubCutaneous at bedtime  isosorbide   mononitrate ER Tablet (IMDUR) 60 milliGRAM(s) Oral daily  lactobacillus acidophilus 1 Tablet(s) Oral two times a day  lamoTRIgine 100 milliGRAM(s) Oral daily  pantoprazole    Tablet 40 milliGRAM(s) Oral before breakfast  polyethylene glycol 3350 17 Gram(s) Oral daily  potassium chloride    Tablet ER 20 milliEquivalent(s) Oral daily  senna 2 Tablet(s) Oral at bedtime  venlafaxine XR. 150 milliGRAM(s) Oral daily    MEDICATIONS  (PRN):  acetaminophen     Tablet .. 650 milliGRAM(s) Oral every 6 hours PRN Temp greater or equal to 38C (100.4F), Mild Pain (1 - 3)  bisacodyl Suppository 10 milliGRAM(s) Rectal daily PRN Constipation  dextrose Oral Gel 15 Gram(s) Oral once PRN Blood Glucose LESS THAN 70 milliGRAM(s)/deciliter  ondansetron Injectable 4 milliGRAM(s) IV Push every 8 hours PRN Nausea and/or Vomiting  traMADol 50 milliGRAM(s) Oral every 8 hours PRN Moderate Pain (4 - 6)    T(C): 36.5 (04-19-22 @ 06:45), Max: 37.1 (04-18-22 @ 20:08)  HR: 65 (04-19-22 @ 06:45) (65 - 81)  BP: 166/75 (04-19-22 @ 06:45) (114/71 - 166/75)  RR: 18 (04-19-22 @ 06:45)  SpO2: 93% (04-19-22 @ 06:45)  Wt(kg): --  I&O's Detail    18 Apr 2022 07:01  -  19 Apr 2022 07:00  --------------------------------------------------------  IN:  Total IN: 0 mL    OUT:    Voided (mL): 125 mL  Total OUT: 125 mL    Total NET: -125 mL                    PHYSICAL EXAM:  General: No distress  Respiratory: b/l air entry  Cardiovascular: S1 S2  Gastrointestinal: soft  Extremities:  edema                         LABORATORY:                        8.9    9.00  )-----------( 237      ( 19 Apr 2022 09:03 )             27.4     04-19    137  |  104  |  29<H>  ----------------------------<  109<H>  3.9   |  23  |  1.90<H>    Ca    9.4      19 Apr 2022 09:03  Mg     2.4     04-19      Sodium, Serum: 137 mmol/L (04-19 @ 09:03)  Sodium, Serum: 140 mmol/L (04-18 @ 08:57)    Potassium, Serum: 3.9 mmol/L (04-19 @ 09:03)  Potassium, Serum: 3.4 mmol/L (04-18 @ 08:57)    Hemoglobin: 8.9 g/dL (04-19 @ 09:03)  Hemoglobin: 8.4 g/dL (04-18 @ 08:57)  Hemoglobin: 8.4 g/dL (04-17 @ 09:13)    Creatinine, Serum 1.90 (04-19 @ 09:03)  Creatinine, Serum 1.80 (04-18 @ 08:57)  Creatinine, Serum 1.80 (04-17 @ 09:13)

## 2022-04-19 NOTE — PROGRESS NOTE ADULT - PROBLEM SELECTOR PLAN 8
Diabetes type II   Hold oral hypoglycemic meds  Insulin Corrective Scale  Finger sticks per routine  Consistent Carb Diet  Hypoglycemia protocol Diabetes type II -HbA1c is 5.8   Hold oral hypoglycemic meds  Insulin Corrective Scale  Finger sticks per routine  Consistent Carb Diet  Hypoglycemia protocol  -STOP Metformin -follow diet order

## 2022-04-19 NOTE — PROGRESS NOTE ADULT - SUBJECTIVE AND OBJECTIVE BOX
Patient is a 77y old  Male who presents with a chief complaint of Hematuria (18 Apr 2022 11:40)    HPI:  76yo M with PMH of Afib (on eliquis), MGUS, depression, CAD s/p stents, HLD, HTN, CKD, DM2, diverticulosis, Anemia , Diastolic  CHF (Echo 1/22 LVEF 55%) and BPH with recent GI Bleed 4/22 with multiple pRBC transfusions recently d/chio from [facility] 4/11/22 presents to the ED with hematuria. Patient states he went to void at 3AM and noticed it was difficult for him to urinate at first, but when he was able to void, he saw a lot of mikayla blood. Patient had 5 episodes of mikayla blood without clots along with dysuria. Denies trauma, falls, penile lesion. Per chart review, patient was admitted to [facility] on 3/30/22 for a rectal bleed and was transfused 4-5 units of pRBC and transferred to [facility] for further workup after a bleeding scan showed a jejunal bleed. During this time, Eliquis was held. Capsule endoscopy revealed possible small bleeding site. Pt's Eliquis was resumed on 4/8 with no reoccurrence of a GIB. Pt last took Eliquis this AM.     Admits to HA, dizziness, nausea, decreased PO intake. Denies fever/chills, chest pain, palpitations, vomiting, constipation, diarrhea, hematochezia.     ED course:   Vitals:  BP:106/71      HR: 61     Temp: 97.2      RR:18     O2: 96%  Labs significant for: H/H 9.4/28.1, PT 21.7, INR 1.84, PTT 45.8, BUN/Cr 31/2.3, glucose 116  EKG: sinus bradycardia with 1st degree AV block, LVH with QRS widening and repolarization abnormality (13 Apr 2022 11:54)    INTERVAL HPI:  4/14/22: Pt seen and examined at bedside. Complaining of 9/10 lower abdominal pain, nausea, as well as lots of mikayla blood loss from the penis. Bladder scan revealed >400cc of urine, pt attempted to use the bathroom but was actively bleeding. Urology Dr. Kiser spoke to the surgical PA, irrigated bladder and removed a copious amount of clots. CBI was ordered. Pt also complaining of b/l knee pain & headache. Denies fever/chills, chest pain, palpitations, vomiting/ constipation/ diarrhea/ hematochezia. Hgb dropped from 9.4 to 8.4, but otherwise hemodynamically stable.   04/15/22 Patient seen and examined at bedside. Patient states mild discomfort in pelvic area.  Hb dropped from 8.4 to 8.1 this AM , Low stable H/H   04/16/22 Patient seen and examined at bedside. Patient states that did not sleep well last night due to CBI leak. Today will stop CBI per uro recommendation. H/H low stable, mild pink urine in mckinnon   04/17/22 Patient seen and examined at bedside. Patient states that he has no complaints at this time. Denies chest pain, abdominal pain, n/v/c/d, fevers/chills. CBI titrated as needed until hematuria resolves. Draining light pink urine. H/H low but stable at 8.4. Constipation +  04/18/22 Patient seen and examined at bedside. Pt states he has mild lower abdominal pain and Left arm pain (tender to palpation). CBI discontinued today, TOV today per uro recs. Mckinnon draining clear urine. H/H low but stable at 8.4 this AM. Remains constipated. + BM today TOV today as per Urology  04/19/22 Patient seen and examined at bedside. Pt still complaining of mild lower abdominal pain, b/l chronic knee pain, and chills. Passed TOV, no hematuria or hematochezia. Pt states he is not voiding as much as he was prior to mckinnon insertion, but does not feel the urge to void. Bladder not distended. H/H 8.9 this AM. Pt had a BM yesterday and today. DC planning.      OVERNIGHT EVENTS:  SBP 130s-160s    Home Medications:  acetaminophen 325 mg oral tablet: 2 tab(s) orally every 6 hours, As needed, Temp greater or equal to 38C (100.4F), Mild Pain (1 - 3) (14 Apr 2022 16:23)  aspirin 81 mg oral tablet: 1 tab(s) orally once a day (13 Apr 2022 15:41)  budesonide-formoterol 80 mcg-4.5 mcg/inh inhalation aerosol: 2 puff(s) inhaled 2 times a day (13 Apr 2022 15:41)  cloNIDine 0.2 mg oral tablet: 1 tab(s) orally 2 times a day (13 Apr 2022 15:41)  diclofenac 1% topical gel: Apply topically to affected area 3 times a day (13 Apr 2022 15:41)  doxazosin 4 mg oral tablet: 1 tab(s) orally 2 times a day (13 Apr 2022 15:41)  famotidine 40 mg oral tablet: 1 tab(s) orally 2 times a day (13 Apr 2022 15:41)  isosorbide mononitrate 30 mg oral tablet, extended release: 1 tab(s) orally once a day (in the morning) (13 Apr 2022 15:41)  lactobacillus acidophilus oral capsule: 1 cap(s) orally 2 times a day (13 Apr 2022 15:41)  lamoTRIgine 100 mg oral tablet: 1 tab(s) orally once a day (13 Apr 2022 15:41)  metFORMIN 500 mg oral tablet: 1 tab(s) orally 2 times a day (13 Apr 2022 15:41)  oxybutynin 15 mg/24 hr oral tablet, extended release: 1 tab(s) orally once a day (13 Apr 2022 15:41)  potassium chloride 20 mEq oral tablet, extended release: 1 tab(s) orally once a day (13 Apr 2022 15:41)  Vitamin B-12 1000 mcg oral tablet: 1 tab(s) orally once a day (13 Apr 2022 15:41)      MEDICATIONS  (STANDING):  amLODIPine   Tablet 10 milliGRAM(s) Oral daily  atorvastatin 10 milliGRAM(s) Oral at bedtime  budesonide  80 MICROgram(s)/formoterol 4.5 MICROgram(s) Inhaler 2 Puff(s) Inhalation two times a day  carvedilol 3.125 milliGRAM(s) Oral every 12 hours  cloNIDine 0.2 milliGRAM(s) Oral two times a day  cyanocobalamin 1000 MICROGram(s) Oral daily  dextrose 5%. 1000 milliLiter(s) (50 mL/Hr) IV Continuous <Continuous>  dextrose 5%. 1000 milliLiter(s) (100 mL/Hr) IV Continuous <Continuous>  dextrose 50% Injectable 25 Gram(s) IV Push once  dextrose 50% Injectable 12.5 Gram(s) IV Push once  dextrose 50% Injectable 25 Gram(s) IV Push once  doxazosin Oral Tab/Cap - Peds 4 milliGRAM(s) Oral two times a day  famotidine    Tablet 40 milliGRAM(s) Oral two times a day  folic acid 1 milliGRAM(s) Oral daily  furosemide    Tablet 40 milliGRAM(s) Oral daily  glucagon  Injectable 1 milliGRAM(s) IntraMuscular once  hydrALAZINE 100 milliGRAM(s) Oral three times a day  insulin lispro (ADMELOG) corrective regimen sliding scale   SubCutaneous three times a day before meals  insulin lispro (ADMELOG) corrective regimen sliding scale   SubCutaneous at bedtime  isosorbide   mononitrate ER Tablet (IMDUR) 60 milliGRAM(s) Oral daily  lactobacillus acidophilus 1 Tablet(s) Oral two times a day  lamoTRIgine 100 milliGRAM(s) Oral daily  pantoprazole    Tablet 40 milliGRAM(s) Oral before breakfast  polyethylene glycol 3350 17 Gram(s) Oral daily  potassium chloride    Tablet ER 20 milliEquivalent(s) Oral daily  senna 2 Tablet(s) Oral at bedtime  venlafaxine XR. 150 milliGRAM(s) Oral daily    MEDICATIONS  (PRN):  acetaminophen     Tablet .. 650 milliGRAM(s) Oral every 6 hours PRN Temp greater or equal to 38C (100.4F), Mild Pain (1 - 3)  bisacodyl Suppository 10 milliGRAM(s) Rectal daily PRN Constipation  dextrose Oral Gel 15 Gram(s) Oral once PRN Blood Glucose LESS THAN 70 milliGRAM(s)/deciliter  ondansetron Injectable 4 milliGRAM(s) IV Push every 8 hours PRN Nausea and/or Vomiting  traMADol 50 milliGRAM(s) Oral every 8 hours PRN Moderate Pain (4 - 6)      Allergies    No Known Allergies    Intolerances        Social History:  Tobacco: quit 45 years ago, former 10 pack years smoker   EtOH: denies  recreational drug use: denies  Lives with: alone  Ambulates:  independent  ADLs: independent  Occupation: retired  Vaccinations: Moderna x2 (13 Apr 2022 11:54)      REVIEW OF SYSTEMS:  CONSTITUTIONAL: Admits to chills, No fever, No fatigue, No myalgia, No Body ache, No Weakness  EYES: No eye pain,  No visual disturbances, No discharge, NO Redness  ENMT:  No ear pain, No nose bleed, No vertigo; No sinus pain, NO throat pain, No Congestion  NECK: No pain, No stiffness  RESPIRATORY: No cough, NO wheezing, No  hemoptysis, NO  shortness of breath  CARDIOVASCULAR: No chest pain, palpitations  GASTROINTESTINAL: Admits to abdominal pain, NO epigastric pain. No nausea, No vomiting; No diarrhea, No constipation. [ x ] BM this AM   GENITOURINARY: Admits to slight dysuria, No frequency, No urgency, No hematuria, NO incontinence  NEUROLOGICAL: No headaches, No dizziness, No numbness, No tingling, No tremors, No weakness, no hematochezia   EXT: Admits to chronic b/l knee pain, No Swelling, No Edema  SKIN:  [ x ] No itching, burning, rashes, or lesions   MUSCULOSKELETAL: Admits to chronic b/l knee pain ,No Jt swelling; No muscle pain, No back pain, No extremity pain  PSYCHIATRIC: No depression,  No anxiety,  No mood swings ,No difficulty sleeping at night  PAIN SCALE: [ x ] None  [  ] Other-  ROS Unable to obtain due to - [  ] Dementia  [  ] Lethargy [  ] Drowsy [  ] Sedated [  ] non verbal  REST OF REVIEW Of SYSTEM - [ x ] Normal     Vital Signs Last 24 Hrs  T(C): 36.5 (19 Apr 2022 06:45), Max: 37.1 (18 Apr 2022 20:08)  T(F): 97.7 (19 Apr 2022 06:45), Max: 98.7 (18 Apr 2022 20:08)  HR: 65 (19 Apr 2022 06:45) (65 - 81)  BP: 166/75 (19 Apr 2022 06:45) (137/75 - 166/75)  BP(mean): --  RR: 18 (19 Apr 2022 06:45) (18 - 18)  SpO2: 93% (19 Apr 2022 06:45) (93% - 95%)  Finger Stick        04-18 @ 07:01  -  04-19 @ 07:00  --------------------------------------------------------  IN: 0 mL / OUT: 125 mL / NET: -125 mL        PHYSICAL EXAM:  GENERAL:  [ x ] NAD , [ x ] well appearing, [  ] Agitated, [  ] Drowsy,  [  ] Lethargy, [  ] confused   HEAD:  [ x ] Normal, [  ] Other  EYES:  [  ] EOMI, [  ] PERRLA, [ x ] conjunctiva and sclera clear normal, [  ] Other,  [  ] Pallor,[  ] Discharge  ENMT:  [x  ] Normal, [x  ] Moist mucous membranes, [ x ] Good dentition, [x  ] No Thrush  NECK:  [ x ] Supple, [ x ] No JVD, [ x ] Normal thyroid, [  ] Lymphadenopathy [  ] Other  CHEST/LUNG:  [ x ] Clear to auscultation bilaterally, [x  ] Breath Sounds equal B/L [  ] poor effort  [ x ] No rales, [ x ] No rhonchi  [ x ]  No wheezing,   HEART:  [x  ] Regular rate and rhythm, [  ] tachycardia, [  ] Bradycardia,  [  ] irregular  [ x ] No murmurs, No rubs, No gallops, [x  ] PPM in place (Mfr:  )  ABDOMEN:  [ x ] Soft, [ ] Nontender- lower abd tender to palpation, [  x] Nondistended, [x  ] No mass, [ x ] Bowel sounds present, [  ] obese  NERVOUS SYSTEM:  [ x ] Alert & Oriented X3, [ x ] Nonfocal  [  ] Confusion  [  ] Encephalopathic [  ] Sedated [  ] Unable to assess, [  ] Dementia [  ] Other-  EXTREMITIES: [ x ] 2+ Peripheral Pulses, No clubbing, No cyanosis,  [  ] edema B/L lower EXT. [  ] PVD stasis skin changes B/L Lower EXT, [  ] wound  LYMPH: No lymphadenopathy noted  SKIN:  [ x ] No rashes or lesions, [  ] Pressure Ulcers, [  ] ecchymosis, [  ] Skin Tears, [  ] Other    DIET: Diet, DASH/TLC:   Sodium & Cholesterol Restricted (04-13-22 @ 12:27)      LABS:                        8.9    9.00  )-----------( 237      ( 19 Apr 2022 09:03 )             27.4     19 Apr 2022 09:03    137    |  104    |  29     ----------------------------<  109    3.9     |  23     |  1.90     Ca    9.4        19 Apr 2022 09:03  Mg     2.4       19 Apr 2022 09:03            Culture Results:   Culture grew 3 or more types of organisms which indicate  collection contamination; consider recollection only if clinically  indicated. (04-14 @ 00:48)                  Culture - Urine (collected 14 Apr 2022 00:48)  Source: Clean Catch Clean Catch (Midstream)  Final Report (15 Apr 2022 10:43):    Culture grew 3 or more types of organisms which indicate    collection contamination; consider recollection only if clinically    indicated.         Anemia Panel:      Thyroid Panel:                RADIOLOGY & ADDITIONAL TESTS:      HEALTH ISSUES - PROBLEM Dx:  Hematuria    Afib    GIB (gastrointestinal bleeding)    Anemia    CAD (coronary artery disease)    Acute kidney injury superimposed on CKD    Chronic CHF    HTN (hypertension)    HLD (hyperlipidemia)    DM (diabetes mellitus)    BPH (benign prostatic hyperplasia)    Anxiety and depression    DVT prophylaxis    Constipation            Consultant(s) Notes Reviewed:  [ x ] YES     Care Discussed with [X] Consultants  [  ] Patient  [  ] Family [  ] HCP [  ]   [  ] Social Service  [  ] RN, [  ] Physical Therapy,[  ] Palliative care team  DVT PPX: [  ] Lovenox, [  ] S C Heparin, [  ] Coumadin, [  ] Xarelto, [  ] Eliquis, [  ] Pradaxa, [  ] IV Heparin drip, [  ] SCD [  ] Contraindication 2 to GI Bleed,[  ] Ambulation [ x ] Contraindicated 2 to  bleed [  ] Contraindicated 2 to Brain Bleed  Advanced directive: [ x ] None, [  ] DNR/DNI Patient is a 77y old  Male who presents with a chief complaint of Hematuria (18 Apr 2022 11:40)    HPI:  76yo M with PMH of Afib (on eliquis), MGUS, depression, CAD s/p stents, HLD, HTN, CKD, DM2, diverticulosis, Anemia , Diastolic  CHF (Echo 1/22 LVEF 55%) and BPH with recent GI Bleed 4/22 with multiple pRBC transfusions recently d/chio from [facility] 4/11/22 presents to the ED with hematuria. Patient states he went to void at 3AM and noticed it was difficult for him to urinate at first, but when he was able to void, he saw a lot of mikayla blood. Patient had 5 episodes of mikayla blood without clots along with dysuria. Denies trauma, falls, penile lesion. Per chart review, patient was admitted to [facility] on 3/30/22 for a rectal bleed and was transfused 4-5 units of pRBC and transferred to [facility] for further workup after a bleeding scan showed a jejunal bleed. During this time, Eliquis was held. Capsule endoscopy revealed possible small bleeding site. Pt's Eliquis was resumed on 4/8 with no reoccurrence of a GIB. Pt last took Eliquis this AM.     Admits to HA, dizziness, nausea, decreased PO intake. Denies fever/chills, chest pain, palpitations, vomiting, constipation, diarrhea, hematochezia.     ED course:   Vitals:  BP:106/71      HR: 61     Temp: 97.2      RR:18     O2: 96%  Labs significant for: H/H 9.4/28.1, PT 21.7, INR 1.84, PTT 45.8, BUN/Cr 31/2.3, glucose 116  EKG: sinus bradycardia with 1st degree AV block, LVH with QRS widening and repolarization abnormality (13 Apr 2022 11:54)    INTERVAL HPI:  4/14/22: Pt seen and examined at bedside. Complaining of 9/10 lower abdominal pain, nausea, as well as lots of mikayla blood loss from the penis. Bladder scan revealed >400cc of urine, pt attempted to use the bathroom but was actively bleeding. Urology Dr. Kiser spoke to the surgical PA, irrigated bladder and removed a copious amount of clots. CBI was ordered. Pt also complaining of b/l knee pain & headache. Denies fever/chills, chest pain, palpitations, vomiting/ constipation/ diarrhea/ hematochezia. Hgb dropped from 9.4 to 8.4, but otherwise hemodynamically stable.   04/15/22 Patient seen and examined at bedside. Patient states mild discomfort in pelvic area.  Hb dropped from 8.4 to 8.1 this AM , Low stable H/H   04/16/22 Patient seen and examined at bedside. Patient states that did not sleep well last night due to CBI leak. Today will stop CBI per uro recommendation. H/H low stable, mild pink urine in mckinnon   04/17/22 Patient seen and examined at bedside. Patient states that he has no complaints at this time. Denies chest pain, abdominal pain, n/v/c/d, fevers/chills. CBI titrated as needed until hematuria resolves. Draining light pink urine. H/H low but stable at 8.4. Constipation +  04/18/22 Patient seen and examined at bedside. Pt states he has mild lower abdominal pain and Left arm pain (tender to palpation). CBI discontinued today, TOV today per uro recs. Mckinnon draining clear urine. H/H low but stable at 8.4 this AM. Remains constipated. + BM today TOV today as per Urology  04/19/22 Patient seen and examined at bedside. Pt still complaining of mild lower abdominal pain, b/l chronic knee pain, and chills. Passed TOV, no hematuria or hematochezia. Pt states he is not voiding as much as he was prior to mckinnon insertion, but does not feel the urge to void. Bladder not distended. H/H 8.9 this AM. Pt had a BM yesterday and today. DC planning.      OVERNIGHT EVENTS:  SBP 130s-160s    Home Medications:  acetaminophen 325 mg oral tablet: 2 tab(s) orally every 6 hours, As needed, Temp greater or equal to 38C (100.4F), Mild Pain (1 - 3) (14 Apr 2022 16:23)  aspirin 81 mg oral tablet: 1 tab(s) orally once a day (13 Apr 2022 15:41)  budesonide-formoterol 80 mcg-4.5 mcg/inh inhalation aerosol: 2 puff(s) inhaled 2 times a day (13 Apr 2022 15:41)  cloNIDine 0.2 mg oral tablet: 1 tab(s) orally 2 times a day (13 Apr 2022 15:41)  diclofenac 1% topical gel: Apply topically to affected area 3 times a day (13 Apr 2022 15:41)  doxazosin 4 mg oral tablet: 1 tab(s) orally 2 times a day (13 Apr 2022 15:41)  famotidine 40 mg oral tablet: 1 tab(s) orally 2 times a day (13 Apr 2022 15:41)  isosorbide mononitrate 30 mg oral tablet, extended release: 1 tab(s) orally once a day (in the morning) (13 Apr 2022 15:41)  lactobacillus acidophilus oral capsule: 1 cap(s) orally 2 times a day (13 Apr 2022 15:41)  lamoTRIgine 100 mg oral tablet: 1 tab(s) orally once a day (13 Apr 2022 15:41)  metFORMIN 500 mg oral tablet: 1 tab(s) orally 2 times a day (13 Apr 2022 15:41)  oxybutynin 15 mg/24 hr oral tablet, extended release: 1 tab(s) orally once a day (13 Apr 2022 15:41)  potassium chloride 20 mEq oral tablet, extended release: 1 tab(s) orally once a day (13 Apr 2022 15:41)  Vitamin B-12 1000 mcg oral tablet: 1 tab(s) orally once a day (13 Apr 2022 15:41)      MEDICATIONS  (STANDING):  amLODIPine   Tablet 10 milliGRAM(s) Oral daily  atorvastatin 10 milliGRAM(s) Oral at bedtime  budesonide  80 MICROgram(s)/formoterol 4.5 MICROgram(s) Inhaler 2 Puff(s) Inhalation two times a day  carvedilol 3.125 milliGRAM(s) Oral every 12 hours  cloNIDine 0.2 milliGRAM(s) Oral two times a day  cyanocobalamin 1000 MICROGram(s) Oral daily  dextrose 5%. 1000 milliLiter(s) (50 mL/Hr) IV Continuous <Continuous>  dextrose 5%. 1000 milliLiter(s) (100 mL/Hr) IV Continuous <Continuous>  dextrose 50% Injectable 25 Gram(s) IV Push once  dextrose 50% Injectable 12.5 Gram(s) IV Push once  dextrose 50% Injectable 25 Gram(s) IV Push once  doxazosin Oral Tab/Cap - Peds 4 milliGRAM(s) Oral two times a day  famotidine    Tablet 40 milliGRAM(s) Oral two times a day  folic acid 1 milliGRAM(s) Oral daily  furosemide    Tablet 40 milliGRAM(s) Oral daily  glucagon  Injectable 1 milliGRAM(s) IntraMuscular once  hydrALAZINE 100 milliGRAM(s) Oral three times a day  insulin lispro (ADMELOG) corrective regimen sliding scale   SubCutaneous three times a day before meals  insulin lispro (ADMELOG) corrective regimen sliding scale   SubCutaneous at bedtime  isosorbide   mononitrate ER Tablet (IMDUR) 60 milliGRAM(s) Oral daily  lactobacillus acidophilus 1 Tablet(s) Oral two times a day  lamoTRIgine 100 milliGRAM(s) Oral daily  pantoprazole    Tablet 40 milliGRAM(s) Oral before breakfast  polyethylene glycol 3350 17 Gram(s) Oral daily  potassium chloride    Tablet ER 20 milliEquivalent(s) Oral daily  senna 2 Tablet(s) Oral at bedtime  venlafaxine XR. 150 milliGRAM(s) Oral daily    MEDICATIONS  (PRN):  acetaminophen     Tablet .. 650 milliGRAM(s) Oral every 6 hours PRN Temp greater or equal to 38C (100.4F), Mild Pain (1 - 3)  bisacodyl Suppository 10 milliGRAM(s) Rectal daily PRN Constipation  dextrose Oral Gel 15 Gram(s) Oral once PRN Blood Glucose LESS THAN 70 milliGRAM(s)/deciliter  ondansetron Injectable 4 milliGRAM(s) IV Push every 8 hours PRN Nausea and/or Vomiting  traMADol 50 milliGRAM(s) Oral every 8 hours PRN Moderate Pain (4 - 6)      Allergies    No Known Allergies    Intolerances        Social History:  Tobacco: quit 45 years ago, former 10 pack years smoker   EtOH: denies  recreational drug use: denies  Lives with: alone  Ambulates:  independent  ADLs: independent  Occupation: retired  Vaccinations: Moderna x2 (13 Apr 2022 11:54)      REVIEW OF SYSTEMS: i am OK  CONSTITUTIONAL: Admits to chills, No fever, No fatigue, No myalgia, No Body ache, No Weakness  EYES: No eye pain,  No visual disturbances, No discharge, NO Redness  ENMT:  No ear pain, No nose bleed, No vertigo; No sinus pain, NO throat pain, No Congestion  NECK: No pain, No stiffness  RESPIRATORY: No cough, NO wheezing, No  hemoptysis, NO  shortness of breath  CARDIOVASCULAR: No chest pain, palpitations  GASTROINTESTINAL: Admits to abdominal pain, NO epigastric pain. No nausea, No vomiting; No diarrhea, No constipation. [ x ] BM this AM   GENITOURINARY: Admits to slight dysuria, No frequency, No urgency, No hematuria, NO incontinence  NEUROLOGICAL: No headaches, No dizziness, No numbness, No tingling, No tremors, No weakness, no hematochezia   EXT: Admits to chronic b/l knee pain, No Swelling, No Edema  SKIN:  [ x ] No itching, burning, rashes, or lesions   MUSCULOSKELETAL: Admits to chronic b/l knee pain ,No Jt swelling; No muscle pain, No back pain, No extremity pain  PSYCHIATRIC: No depression,  No anxiety,  No mood swings ,No difficulty sleeping at night  PAIN SCALE: [ x ] None  [  ] Other-  ROS Unable to obtain due to - [  ] Dementia  [  ] Lethargy [  ] Drowsy [  ] Sedated [  ] non verbal  REST OF REVIEW Of SYSTEM - [ x ] Normal     Vital Signs Last 24 Hrs  T(C): 36.5 (19 Apr 2022 06:45), Max: 37.1 (18 Apr 2022 20:08)  T(F): 97.7 (19 Apr 2022 06:45), Max: 98.7 (18 Apr 2022 20:08)  HR: 65 (19 Apr 2022 06:45) (65 - 81)  BP: 166/75 (19 Apr 2022 06:45) (137/75 - 166/75)  BP(mean): --  RR: 18 (19 Apr 2022 06:45) (18 - 18)  SpO2: 93% (19 Apr 2022 06:45) (93% - 95%)  Finger Stick        04-18 @ 07:01  -  04-19 @ 07:00  --------------------------------------------------------  IN: 0 mL / OUT: 125 mL / NET: -125 mL        PHYSICAL EXAM:  GENERAL:  [ x ] NAD , [ x ] well appearing, [  ] Agitated, [  ] Drowsy,  [  ] Lethargy, [  ] confused   HEAD:  [ x ] Normal, [  ] Other  EYES:  [ x ] EOMI, [ x ] PERRLA, [ x ] conjunctiva and sclera clear normal, [  ] Other,  [ x ] Pallor,[  ] Discharge  ENMT:  [x  ] Normal, [x  ] Moist mucous membranes, [ x ] Good dentition, [x  ] No Thrush  NECK:  [ x ] Supple, [ x ] No JVD, [ x ] Normal thyroid, [  ] Lymphadenopathy [  ] Other  CHEST/LUNG:  [ x ] Clear to auscultation bilaterally, [x  ] Breath Sounds equal B/L [  ] poor effort  [ x ] No rales, [ x ] No rhonchi  [ x ]  No wheezing,   HEART:  [x  ] Regular rate and rhythm, [  ] tachycardia, [  ] Bradycardia,  [  ] irregular  [ x ] No murmurs, No rubs, No gallops, [x  ] PPM in place (Mfr:  )  ABDOMEN:  [ x ] Soft, [ ] Nontender- lower abd tender to palpation, [  x] Nondistended, [x  ] No mass, [ x ] Bowel sounds present, [x] obese  NERVOUS SYSTEM:  [ x ] Alert & Oriented X3, [ x ] Nonfocal  [  ] Confusion  [  ] Encephalopathic [  ] Sedated [  ] Unable to assess, [  ] Dementia [  ] Other-  EXTREMITIES: [ x ] 2+ Peripheral Pulses, No clubbing, No cyanosis,  [  ] edema B/L lower EXT. [  ] PVD stasis skin changes B/L Lower EXT, [  ] wound  LYMPH: No lymphadenopathy noted  SKIN:  [ x ] No rashes or lesions, [  ] Pressure Ulcers, [  ] ecchymosis, [  ] Skin Tears, [  ] Other    DIET: Diet, DASH/TLC:   Sodium & Cholesterol Restricted (04-13-22 @ 12:27)      LABS:                        8.9    9.00  )-----------( 237      ( 19 Apr 2022 09:03 )             27.4     19 Apr 2022 09:03    137    |  104    |  29     ----------------------------<  109    3.9     |  23     |  1.90     Ca    9.4        19 Apr 2022 09:03  Mg     2.4       19 Apr 2022 09:03      Culture Results:   Culture grew 3 or more types of organisms which indicate  collection contamination; consider recollection only if clinically  indicated. (04-14 @ 00:48)        Culture - Urine (collected 14 Apr 2022 00:48)  Source: Clean Catch Clean Catch (Midstream)  Final Report (15 Apr 2022 10:43):    Culture grew 3 or more types of organisms which indicate    collection contamination; consider recollection only if clinically    indicated.        RADIOLOGY & ADDITIONAL TESTS: none       HEALTH ISSUES - PROBLEM Dx:  Hematuria    Afib    GIB (gastrointestinal bleeding)    Anemia    CAD (coronary artery disease)    Acute kidney injury superimposed on CKD    Chronic CHF    HTN (hypertension)    HLD (hyperlipidemia)    DM (diabetes mellitus)    BPH (benign prostatic hyperplasia)    Anxiety and depression    DVT prophylaxis    Constipation            Consultant(s) Notes Reviewed:  [ x ] YES     Care Discussed with [X] Consultants  [x  ] Patient  [  ] Family [  ] HCP [  ]   [x  ] Social Service  [ x ] RN, [ x ] Physical Therapy,[  ] Palliative care team  DVT PPX: [  ] Lovenox, [  ] S C Heparin, [  ] Coumadin, [  ] Xarelto, [  ] Eliquis, [  ] Pradaxa, [  ] IV Heparin drip, [ x ] SCD [  ] Contraindication 2 to GI Bleed,[  ] Ambulation [ x ] Contraindicated 2 to  bleed [  ] Contraindicated 2 to Brain Bleed  Advanced directive: [ x ] None, [  ] DNR/DNI

## 2022-04-19 NOTE — PROGRESS NOTE ADULT - PROVIDER SPECIALTY LIST ADULT
Cardiology
Nephrology
Nephrology
Urology
Cardiology
Nephrology
Urology
Cardiology
Nephrology
Urology
Internal Medicine

## 2022-04-19 NOTE — PROGRESS NOTE ADULT - PROBLEM SELECTOR PLAN 5
HFpEF   -Continue home Lasix 40 mg daily ,  -Dash/ TLC Diet  - Cardio DR. Bliss's group following HFpEF   -Continue home Lasix 40 mg daily ,  -Coreg 2x day   -Dash/ TLC Diet  - Cardio DR. Bliss's group following

## 2022-04-19 NOTE — PROGRESS NOTE ADULT - PROBLEM SELECTOR PLAN 9
Pt recently admitted to [facility] for GIB,  got 5 u pRBC in total at [facility]   -Denies GIB since hospitalization  -On Pantoprazole qd  -Continue home Pepcid  -Continue to monitor for signs/symptoms of GIB

## 2022-04-19 NOTE — DISCHARGE NOTE NURSING/CASE MANAGEMENT/SOCIAL WORK - NSDCFUADDAPPT_GEN_ALL_CORE_FT
Please make the appointments with your primary care Dr. Man, Cardiology Dr. Sheridan and Urology Dr. Kiser   STOP ELIQUIS due to bleeding, follow up with cardiology for new recommendations   REASSUME ASPIRIN on 04/27/22   STOP METFORMIN, you don't need medications for diabetes, follow up a healthy diet and regular exercises   TAKE LAXATIVE (SENNA, MIRALAX OR DULCOLAX) for constipation   Patient or caretaker is responsible to make all appointments.   IT IS REALLY IMPORTANT TO FOLLOW UP INSTRUCTIONS FOR MEDICATIONS   If you can urinate or see blood in the urine or stool you have to come back to the ED.  Please notify patient/wife the x-ray was resulted with no fracture or dislocation.  May continue to wear sling removing it several times per day to go to a full range of motion if pain is not improving please have patient come in for reevaluation as we discussed I would consider physical therapy versus more advanced imaging such as MRI at that time

## 2022-04-19 NOTE — PROGRESS NOTE ADULT - PROBLEM SELECTOR PLAN 3
- baseline per chart review ~1.9-3,  - Creatinine slightly uptrended from 1.8 to 1.9 this AM   - Follow BMP  - Renal Dose Meds

## 2022-04-19 NOTE — PROGRESS NOTE ADULT - PROBLEM SELECTOR PLAN 4
Chronic, elevated SBP this   - Continue with increased dose of Imdur 60mg qd as per Cardio recs   - Continue home Clonidine, Hydralazine, Coreg, Amlodipine and  Lasix  daily   - Dash Diet

## 2022-04-19 NOTE — PROGRESS NOTE ADULT - NS ATTEND BILL GEN_ALL_CORE
PA/NP to bill
Attending to bill

## 2022-04-19 NOTE — DISCHARGE NOTE NURSING/CASE MANAGEMENT/SOCIAL WORK - PATIENT PORTAL LINK FT
You can access the FollowMyHealth Patient Portal offered by [facility] by registering at the following website: [email or web address]. By joining [facility]’s FollowMyHealth portal, you will also be able to view your health information using other applications (apps) compatible with our system.

## 2022-04-19 NOTE — PROGRESS NOTE ADULT - NS PANP COMMENT GEN_ALL_CORE FT
Chart review    Patient seen and examined    Agree with plan as outlined above    77M with PMH of atrial fibrillation (on eliquis), incomplete RBBB CAD s/p stents, diastolic heart failure, HLD, HTN, CKD stage 3b, DM, anxiety/depression, GIB, recently admitted to [facility] and then transferred to [facility] for GIB, presents with hematuria.     CAD s/p stents/AFib, RBBB, diastolic CHF, HTN, HLD  - HR remains controlled in 70- 80's per flow sheet     - BP labile 130's- 160's, likely reactive   - continue coreg, Clonidine,  Norvasc, Lasix, Hydralazine, Isordil and statin daily    - hematuria resolved s/p CBI, ASA and Eliquis on hold   - with recent recurrent admissions: GIB requiring PRBCs transfusion, hematuria requiring CBI, pt is not a candidate for AC at this time, would hold Eliquis and continue on ASA therapy if possible.   - Patient is not complaining of any anginal symptoms at this time.  - No clear evidence of acute ischemia.  No acute changes on EKG compared to previous.  - No meaningful evidence of volume overload.  Non-orthopneic on RA  - Previous TTE shows 10/21 EF 55%, mild AS, mild MR, mild TR, moderate LVH  - continue to monitor CBC, transfuse to keep hgb> 8, given CAD, H/H stable 8.9/27.4, no further signs of bleeding noted.   - Monitor and replete lytes, keep K>4, Mg>2.  - DC planing back to group home today

## 2022-04-19 NOTE — PROGRESS NOTE ADULT - ASSESSMENT
77M with PMH of atrial fibrillation (on eliquis), incomplete RBBB CAD s/p stents, diastolic heart failure, HLD, HTN, CKD stage 3b, DM, anxiety/depression, GIB, recently admitted to [facility] and then transferred to [facility] for GIB, presents with hematuria.     CAD s/p stents/AFib, RBBB, diastolic CHF, HTN, HLD  - Remains rate-controlled.  Continue Coreg.  Would continue to hold home AC in the setting of hematuria (still on CBI)  - Patient is not complaining of any anginal symptoms at this time.  - No clear evidence of acute ischemia.  No acute changes on EKG compared to previous.  - Holding ASA for now.  Would resume when cleared by Uro  - Continue statin    - No meaningful evidence of volume overload.  Non-orthopneic on RA  - Previous TTE shows 10/21 EF 55%, mild AS, mild MR, mild TR, moderate LVH    - BP stable and controlled 140-170s   - Continue Clonidine, Norvasc, Lasix, Hydralazine, and Isordil 30 mg daily.   - Elevation may have some reactive component.  However, can uptitrate Imdur to 60 mg if necessary    - Given CAD would keep hgb > 8     - Monitor and replete lytes, keep K>4, Mg>2.  - Will continue to follow.    Bonita Way, MS FNP, [facility]  Nurse Practitioner- Cardiology   Spectra #[phone]
77M with PMH of atrial fibrillation (on eliquis), incomplete RBBB CAD s/p stents, diastolic heart failure, HLD, HTN, CKD stage 3b, DM, anxiety/depression, GIB, recently admitted to [facility] and then transferred to [facility] for GIB, presents with hematuria.     CAD s/p stents/AFib, RBBB, diastolic CHF, HTN, HLD  - HR remains controlled in 70- 80's per flow sheet     - BP labile 130's- 160's, likely reactive   - continue coreg, Clonidine,  Norvasc, Lasix, Hydralazine, Isordil and statin daily    - hematuria resolved s/p CBI, ASA and Eliquis on hold   - with recent recurrent admissions: GIB requiring PRBCs transfusion, hematuria requiring CBI, pt is not a candidate for AC at this time, would hold Eliquis and continue on ASA therapy if possible.   - Patient is not complaining of any anginal symptoms at this time.  - No clear evidence of acute ischemia.  No acute changes on EKG compared to previous.  - No meaningful evidence of volume overload.  Non-orthopneic on RA  - Previous TTE shows 10/21 EF 55%, mild AS, mild MR, mild TR, moderate LVH  - continue to monitor CBC, transfuse to keep hgb> 8, given CAD, H/H stable 8.9/27.4, no further signs of bleeding noted.   - Monitor and replete lytes, keep K>4, Mg>2.  - DC planing back to group home today   - Will continue to follow.    Avani Link [facility]  Nurse Practitioner - Cardiology   Spectra #[phone]  
77M with PMH of atrial fibrillation (on eliquis), incomplete RBBB CAD s/p stents, diastolic heart failure, HLD, HTN, CKD stage 3b, DM, anxiety/depression, GIB, recently admitted to [facility] and then transferred to [facility] for GIB, presents with hematuria.     CAD s/p stents/AFib, RBBB, diastolic CHF, HTN, HLD  - HR remains controlled in 70's per flow sheet     - BP labile 120's- 160's, likely reactive   - continue coreg, Clonidine,  Norvasc, Lasix, Hydralazine, Isordil and statin daily    - Eliquis and ASA on hold in the setting of hematuria (still on CBI), resume when cleared by urology  - Patient is not complaining of any anginal symptoms at this time.  - No clear evidence of acute ischemia.  No acute changes on EKG compared to previous.  - No meaningful evidence of volume overload.  Non-orthopneic on RA  - Previous TTE shows 10/21 EF 55%, mild AS, mild MR, mild TR, moderate LVH  - continue to monitor CBC, transfuse to keep hgb> 8, given CAD   - Monitor and replete lytes, keep K>4, Mg>2.  - Will continue to follow.    Avani Link, [facility]  Nurse Practitioner - Cardiology   Spectra #[phone]  
77M with PMH of atrial fibrillation (on eliquis), incomplete RBBB CAD s/p stents, diastolic heart failure, HLD, HTN, CKD stage 3b, DM, anxiety/depression, GIB, recently admitted to [facility] and then transferred to [facility] for GIB, presents with hematuria.     CAD s/p stents/AFib, RBBB, diastolic CHF, HTN, HLD  - Remains rate-controlled.  Continue Coreg.    - Would continue to hold home AC in the setting of hematuria (still on CBI)  - Patient is not complaining of any anginal symptoms at this time.  - No clear evidence of acute ischemia.  No acute changes on EKG compared to previous.  - Holding ASA for now.  Would resume when cleared by Uro  - Continue statin    - No meaningful evidence of volume overload.  Non-orthopneic on RA  - Previous TTE shows 10/21 EF 55%, mild AS, mild MR, mild TR, moderate LVH    - BP stable and controlled 150s  - Continue Clonidine 0.2, Norvasc 10, Lasix, Hydralazine, and Isordil 30 mg daily.   - Elevation may have some reactive component.  However, can uptitrate Imdur to 60 mg if necessary    - Given CAD would keep hgb > 8     - Monitor and replete lytes, keep K>4, Mg>2.  - Will continue to follow.    Bonita Way, MS FNP, [facility]  Nurse Practitioner- Cardiology   Spectra #[phone]
77M with pmh of atrial fibrillation (on eliquis), incomplete RBBB CAD s/p stents, diastolic heart failure, HLD, HTN, CKD stage 3b, DM, anxiety/depression, GIB, recently admitted to [facility] and then transferred to [facility] for GIB, presents with hematuria.     CAD s/p stents/AFib   - Remains rate-controlled.  Continue Coreg.  Would continue to hold home AC in the setting of hematuria (still on CBI)  - Patient is not complaining of any anginal symptoms at this time.  - No clear evidence of acute ischemia.  No acute changes on EKG compared to previous.  - No meaningful evidence of volume overload.  Non-orthopneic on RA  - Previous TTE shows 10/21 EF 55%, mild AS, mild MR, mild TR, moderate LVH  - Holding ASA for now.  Would resume when cleared by Uro  - Continue statin  - BP on the high side at systolic 140-160.  Continue Clonidine .2 mf q12H, NOrvasc 10 mg daily, Lasix 40 mg daily PO, Hydralazine 100 mg q8H, and Isordil 30 mg daily.  Elevation may have some reactive component.  However, can uptitrate Imdur to 60 mg if necessary  - Given CAD would keep hgb > 8   - Monitor and replete lytes, keep K>4, Mg>2.    - Will continue to follow.    Shilpa Kirkpatrick [facility]  Cardiology   Spectra #[phone]    
CKD 4  Hematuria  Recent Anemia, GI bleed  Diabetes  h/o CHF  Hypertension  h/o SLE  Hypokalemia    Stable renal indices. On CBI. Monitor h/h trend. Transfuse PRN. Will resume PO potassium. Retacrit for anemia.   Monitor blood sugar levels. Insulin coverage as needed. Monitor BP trend. Titrate BP meds as needed.   Will follow electrolytes and renal function trend.  follow up. 
CKD 4  Hematuria  Recent Anemia, GI bleed  Diabetes  h/o CHF  Hypertension  h/o SLE  Hypokalemia    Stable renal indices. Monitor h/h trend. Transfuse PRN. Potassium supplementation. Retacrit for anemia.   Monitor blood sugar levels. Insulin coverage as needed. Monitor BP trend. Titrate BP meds as needed.   Will follow electrolytes and renal function trend.  follow up. 
Gross hematuria  improving  Continue CBI  await culture and cytology results
Resolved hematuria  d/c mckinnon today for TOV  continue withholding Eliquis, ASA
77M with pmh of atrial fibrillation (on eliquis), incomplete RBBB CAD s/p stents, diastolic heart failure, HLD, HTN, CKD stage 3b, DM, anxiety/depression, GIB, recently admitted to \[facility]\"" and then transferred to [facility] for GIB, presents with hematuria.     CAD/ AFib   - Patient is not complaining of any anginal symptoms at this time.  - No clear evidence of acute ischemia  - No acute changes on EKG compared to previous.  - No meaningful evidence of volume overload.  - Previous TTE shows 10/21 EF 55%, mild AS, mild MR, mild TR, moderate LVH  - Hold eliquis and ASA on CBI ,   - Continue statin, clonidine, lasix, amlodipine, hydralazine, isosorbide   -given CAD would keep hgb > 8     - Monitor and replete lytes, keep K>4, Mg>2.  - Other cardiovascular workup will depend on clinical course.  - All other workup per primary team.  - Will continue to follow.  Carmelina Leger FNP-C  Cardiology NP  SPECTRA [phone]            
Hematuria resolved      d/c cbi  Possible TOV 4/18/22  Dr Kiser to resume care 4/18/22
Improving hematuria  Continue CBI for now    ?stop CBI tomorrow if urine clears  Dr. Alberts covering me over weekend.
Improving hematuria.      Cont CBI
Leanna Baig(Attending)
76yo M with PMH of Afib (on eliquis), MGUS, depression, CAD s/p stents, HLD, HTN, CKD, DM2, diverticulosis, questionable CHF (Echo 1/22 LVEF 55%) and BPH presents to the ED with hematuria.       
78yo M with PMH of Afib (on eliquis), MGUS, depression, CAD s/p stents, HLD, HTN, CKD, DM2, diverticulosis, questionable CHF (Echo 1/22 LVEF 55%) and BPH presents to the ED with hematuria.       
78yo M with PMH of Afib (on eliquis), MGUS, depression, CAD s/p stents, HLD, HTN, CKD, DM2, diverticulosis, questionable CHF (Echo 1/22 LVEF 55%) and BPH presents to the ED with hematuria. CBI initiated by Urology. TOV today.       
76yo M with PMH of Afib (on eliquis), MGUS, depression, CAD s/p stents, HLD, HTN, CKD, DM2, diverticulosis, questionable CHF (Echo 1/22 LVEF 55%) and BPH presents to the ED with hematuria.       
78yo M with PMH of Afib (on eliquis), MGUS, depression, CAD s/p stents, HLD, HTN, CKD, DM2, diverticulosis, questionable CHF (Echo 1/22 LVEF 55%) and BPH presents to the ED with hematuria.       
78yo M with PMH of Afib (on eliquis), MGUS, depression, CAD s/p stents, HLD, HTN, CKD, DM2, diverticulosis, questionable CHF (Echo 1/22 LVEF 55%) and BPH presents to the ED with hematuria. CBI initiated by Urology, mckinnon removed & CBI stopped on 4/18. Pt passed TOV.

## 2022-04-19 NOTE — PROGRESS NOTE ADULT - REASON FOR ADMISSION
Hematuria

## 2022-04-19 NOTE — PROGRESS NOTE ADULT - ATTENDING COMMENTS
78yo M with PMH of Afib (on eliquis), MGUS, depression, CAD s/p stents, HLD, HTN, CKD, DM2, diverticulosis, questionable CHF (Echo 1/22 LVEF 55%) and BPH presents to the ED with hematuria.  Pt seen, examined, case & care plan d/w pt, residents at detail.  Cardiology-DR Rosario  at detail HOLD AC /ASA  D/W Urology-DR Kiser at detail, Hematuria- on CBI ,   PT Eval.---> NO Need, RX Constipation  AM Labs   PO diet  SCD's .   Total care time is 40 minutes.
78yo M with PMH of Afib (on eliquis), MGUS, depression, CAD s/p stents, HLD, HTN, CKD, DM2, diverticulosis, questionable CHF (Echo 1/22 LVEF 55%) and BPH presents to the ED with hematuria.  Pt seen, examined, case & care plan d/w pt, residents at detail.  Cardiology-DR Monterroso  at detail HOLD AC /ASA, Pt is Not a candidate for AC as d/w Cardiology  -Urology  DR Kiser d/w at detail today, No Further urology work up, HOLD ASA for 4  days to avoid hematuria   PT Eval.---> NO Need, d/w group home.  PO diet  SCD's .   Total d/c care time is 60 minutes.
76yo M with PMH of Afib (on eliquis), MGUS, depression, CAD s/p stents, HLD, HTN, CKD, DM2, diverticulosis, questionable CHF (Echo 1/22 LVEF 55%) and BPH presents to the ED with hematuria.  Pt seen, examined, case & care plan d/w pt, residents at detail.  Cardiology-DR Rosario  at detail HOLD AC /ASA  -Urology follow up for  Hematuria- on CBI ,   PT Eval.---> NO Need,   AM Labs   PO diet  SCD's .   Total care time is 40 minutes.
76yo M with PMH of Afib (on eliquis), MGUS, depression, CAD s/p stents, HLD, HTN, CKD, DM2, diverticulosis, questionable CHF (Echo 1/22 LVEF 55%) and BPH presents to the ED with hematuria.  Pt seen, examined, case & care plan d/w pt, residents at detail.  Cardiology-DR Sheridan at detail HOLD AC /ASA  D/W Urology-DR Kiser at detail, Hematuria- on CBI , Repeat PT/INR, HOLD AC , Urine Cytology to follow,   PT Eval.---> NO Need,   AM Labs   PO diet  SCD's .   Total care time is 45 minutes.
78yo M with PMH of Afib (on eliquis), MGUS, depression, CAD s/p stents, HLD, HTN, CKD, DM2, diverticulosis, questionable CHF (Echo 1/22 LVEF 55%) and BPH presents to the ED with hematuria.  Pt seen, examined, case & care plan d/w pt, residents at detail.  Cardiology-DR Rosario  at detail HOLD AC /ASA  D/W Urology-DR Kiser at detail, Hematuria- on CBI ,   PT Eval.---> NO Need,   AM Labs   PO diet  SCD's .   Total care time is 40 minutes.
78yo M with PMH of Afib (on eliquis), MGUS, depression, CAD s/p stents, HLD, HTN, CKD, DM2, diverticulosis, questionable CHF (Echo 1/22 LVEF 55%) and BPH presents to the ED with hematuria.  Pt seen, examined, case & care plan d/w pt, residents at detail.  Cardiology-DR Monterroso  at detail HOLD AC /ASA, Pt is Not a candidate for AC as d/w DR Monterroso.  -Urology follow up- D/C CBI & D/C El cath as per DR Kiser   PT Eval.---> NO Need,   AM Labs   PO diet  SCD's .   Total care time is 40 minutes.

## 2022-04-19 NOTE — PROGRESS NOTE ADULT - PROBLEM SELECTOR PLAN 1
-El & CBI discontinued yesterday, passed TOV yesterday 4/18 as per Dr. Kiser   -Hold home Eliquis/ASA, f/u on Cardio ([facility]) recs   -urine cytopathology reveals neutrophils and blood -NEG for malignancy   -Urology Dr. Kiser following -El & CBI discontinued yesterday, passed TOV yesterday 4/18 as per Dr. Kiser   -Hold home Eliquis/ASA, f/u on Cardio ([facility]) recs   -urine cytopathology reveals neutrophils and blood -NEG for malignancy   -PT consulted  -Urology Dr. Kiser following -El & CBI discontinued yesterday, passed TOV yesterday 4/18 as per Dr. Kiser -RESOLVED   -Hold home Eliquis/ASA, f/u on Cardio ([facility]) recs   -urine cytopathology reveals neutrophils and blood -NEG for malignancy   -Urology Dr. Kiser following d/w HOLD ASA for 4 more days .No further plan for urological work up.  as per cardiology DO NOT start AC -Eliquis

## 2022-04-19 NOTE — DISCHARGE NOTE NURSING/CASE MANAGEMENT/SOCIAL WORK - NSDCVIVACCINE_GEN_ALL_CORE_FT
Tdap; 24-Jul-2020 09:31; Valdez Watson (RN); [employer]; [id number] (Exp. Date: 17-Sep-2021); IntraMuscular; Deltoid Left.; 0.5 milliLiter(s); VIS (VIS Published: 09-May-2013, VIS Presented: 24-Jul-2020);

## 2022-04-19 NOTE — PROGRESS NOTE ADULT - PROBLEM SELECTOR PLAN 6
Chronic, HR stable   Continue Coreg  2x day  Holding Eliquis /ASA in setting of hematuria, f/u Cardio recs Chronic, HR stable   Continue Coreg  2x day  Holding Eliquis /ASA in setting of hematuria, f/u Cardio recs  as d/w Cardiology pt is NOT a candidate for AC 2/2 GI Bleed &  Bleed. d/w pt  Out pt follow up with DR Sheridan

## 2022-05-06 NOTE — ED ADULT NURSE NOTE - MODE OF DISCHARGE
Airway  Performed by: Prakash Flores MD  Authorized by: Prakash Flores MD     Final Airway Type:  Supraglottic airway  Final Supraglottic Airway:  Unique  SGA Size*:  2.5  Attempts*:  1  Number of Other Approaches Attempted:  0   Patient Identified, Procedure confirmed, Emergency equipment available and Safety protocols followed  Location:  OR  Urgency:  Elective  Indications for Airway Management:  Anesthesia  Spontaneous Ventilation: present    Sedation Level:  Anesthetized  Mask Difficulty Assessment:  0 - not attempted  Performed By:  Anesthesiologist  Anesthesiologist:  Prakash Flores MD  Start Time: 5/6/2022 3:09 PM        
Peripheral IV  Performed by: Prakash Flores MD  Authorized by: Prakash Flores MD     Size:  22 G  Laterality:  Left  Location:  Hand  Site Prep:  Chlorhexidine gluconate (CHG)  Attempts:  1  Securement: Tape and Transparent dressing  Performed By:  Anesthesiologist  Anesthesiologist:  Prakash Flores MD        
Stretcher

## 2022-05-19 RX ORDER — CARVEDILOL 6.25 MG/1
6.25 TABLET, FILM COATED ORAL
Qty: 180 | Refills: 2 | Status: ACTIVE | COMMUNITY
Start: 2021-11-30

## 2022-05-20 RX ORDER — POTASSIUM CHLORIDE 1500 MG/1
20 TABLET, EXTENDED RELEASE ORAL
Qty: 90 | Refills: 1 | Status: ACTIVE | COMMUNITY
Start: 2022-01-26

## 2022-05-28 NOTE — DIETITIAN INITIAL EVALUATION ADULT. - PROBLEM SELECTOR PLAN 9
Banner Transposition Flap Text: The defect edges were debeveled with a #15 scalpel blade.  Given the location of the defect and the proximity to free margins a Banner transposition flap was deemed most appropriate.  Using a sterile surgical marker, an appropriate flap drawn around the defect. The area thus outlined was incised deep to adipose tissue with a #15 scalpel blade.  The skin margins were undermined to an appropriate distance in all directions utilizing iris scissors. - chronic  - CAD s/p stents >4 years ago (not on plavix)  - c/w aspirin

## 2022-06-06 NOTE — SWALLOW VFSS/MBS ASSESSMENT ADULT - DIAGNOSTIC IMPRESSIONS
Patient alert and oriented x4, VSS, sitting up in bed eating breakfast. Patient denies n/v, diarrhea, SOB, pain. Patient states last drink was \"about 3 days ago\" and consisted of \"3 sips of beer. \"  Patient states \"I cut way back. \"  Patient denies needs at this time, states \"I am just hungry, and I want to get home. \"  Bed in lowest and locked position with seizure precautions in place. Patient refusing bed alarm at this time. Non-slip socks on, call light in reach. Will continue to monitor pt needs. 1. The patient demonstrates a functional oral phase for pureed, regular solids, moderately thick, mildly thick, and thin liquids marked by adequate oral acceptance with timely collection, transport, and AP transit time.   2. The patient demonstrates a mild pharyngeal dysphagia for pureed and regular solids marked by timely pharyngeal swallow trigger with reduced base of tongue retraction, reduced hyolaryngeal elevation, adequate epiglottic deflection, and reduced pharyngeal contractility resulting in trace stasis at the BOT, vallecula, posterior pharyngeal wall, and pyriform sinuses, which reduced with liquid wash. No laryngeal penetration/aspiration visualized with pureed and regular solids.   3. Patient demonstrates a mild pharyngeal dysphagia for moderately thick and mildly thick liquids marked by delayed pharyngeal swallow trigger (with bolus head at the level of the pyriform sinuses with mildly thick liquids) with reduced base of tongue retraction, reduced hyolaryngeal elevation, reduced epiglottic deflection, and reduced pharyngeal contractility resulting in trace stasis at the BOT, vallecula, posterior pharyngeal wall, and pyriform sinuses, which reduced with subsequent swallow. Incomplete closure of the laryngeal vestibule visualized with moderately thick liquids and mildly thick liquids resulting in silent laryngeal penetration during the swallow without full retrieval with moderately thick liquids x1 and silent laryngeal penetration before and during the swallow without full retrieval with mildly thick liquids x1, not reduplicated across multiple trials of single, small cup sips of moderately thick and mildly thick liquids.   4. Patient demonstrates a severe pharyngeal dysphagia for thin liquids marked by delayed pharyngeal swallow trigger with bolus head at the level of the pyriform sinuses with reduced base of tongue retraction, reduced hyolaryngeal elevation, reduced epiglottic deflection, reduced pharyngeal contractility resulting in

## 2022-06-07 ENCOUNTER — INPATIENT (INPATIENT)
Facility: HOSPITAL | Age: 77
LOS: 1 days | Discharge: HOME | DRG: 605 | End: 2022-06-09
Attending: INTERNAL MEDICINE | Admitting: INTERNAL MEDICINE
Payer: MEDICARE

## 2022-06-07 ENCOUNTER — EMERGENCY (EMERGENCY)
Facility: HOSPITAL | Age: 77
LOS: 1 days | Discharge: HOME | End: 2022-06-07
Attending: EMERGENCY MEDICINE | Admitting: EMERGENCY MEDICINE
Payer: MEDICARE

## 2022-06-07 ENCOUNTER — APPOINTMENT (OUTPATIENT)
Dept: CARDIOLOGY | Facility: CLINIC | Age: 77
End: 2022-06-07

## 2022-06-07 VITALS
WEIGHT: 125 LBS | SYSTOLIC BLOOD PRESSURE: 165 MMHG | HEART RATE: 67 BPM | TEMPERATURE: 97 F | RESPIRATION RATE: 20 BRPM | HEIGHT: 68 IN | DIASTOLIC BLOOD PRESSURE: 97 MMHG | OXYGEN SATURATION: 98 %

## 2022-06-07 VITALS
TEMPERATURE: 98 F | RESPIRATION RATE: 16 BRPM | SYSTOLIC BLOOD PRESSURE: 194 MMHG | DIASTOLIC BLOOD PRESSURE: 94 MMHG | OXYGEN SATURATION: 95 % | HEART RATE: 64 BPM

## 2022-06-07 VITALS
DIASTOLIC BLOOD PRESSURE: 97 MMHG | RESPIRATION RATE: 16 BRPM | OXYGEN SATURATION: 97 % | HEART RATE: 72 BPM | TEMPERATURE: 97 F | SYSTOLIC BLOOD PRESSURE: 182 MMHG | WEIGHT: 223.11 LBS | HEIGHT: 68 IN

## 2022-06-07 DIAGNOSIS — Z98.49 CATARACT EXTRACTION STATUS, UNSPECIFIED EYE: Chronic | ICD-10-CM

## 2022-06-07 DIAGNOSIS — I66.9 OCCLUSION AND STENOSIS OF UNSPECIFIED CEREBRAL ARTERY: Chronic | ICD-10-CM

## 2022-06-07 DIAGNOSIS — N44.00 TORSION OF TESTIS, UNSPECIFIED: Chronic | ICD-10-CM

## 2022-06-07 DIAGNOSIS — L05.91 PILONIDAL CYST WITHOUT ABSCESS: Chronic | ICD-10-CM

## 2022-06-07 DIAGNOSIS — Z98.89 OTHER SPECIFIED POSTPROCEDURAL STATES: Chronic | ICD-10-CM

## 2022-06-07 PROBLEM — Z87.19 PERSONAL HISTORY OF OTHER DISEASES OF THE DIGESTIVE SYSTEM: Chronic | Status: ACTIVE | Noted: 2022-04-13

## 2022-06-07 LAB
ALBUMIN SERPL ELPH-MCNC: 3.4 G/DL — SIGNIFICANT CHANGE UP (ref 3.3–5)
ALBUMIN SERPL ELPH-MCNC: 3.5 G/DL — SIGNIFICANT CHANGE UP (ref 3.3–5)
ALP SERPL-CCNC: 67 U/L — SIGNIFICANT CHANGE UP (ref 40–120)
ALP SERPL-CCNC: 77 U/L — SIGNIFICANT CHANGE UP (ref 40–120)
ALT FLD-CCNC: 20 U/L — SIGNIFICANT CHANGE UP (ref 12–78)
ALT FLD-CCNC: 22 U/L — SIGNIFICANT CHANGE UP (ref 12–78)
ANION GAP SERPL CALC-SCNC: 6 MMOL/L — SIGNIFICANT CHANGE UP (ref 5–17)
ANION GAP SERPL CALC-SCNC: 8 MMOL/L — SIGNIFICANT CHANGE UP (ref 5–17)
AST SERPL-CCNC: 20 U/L — SIGNIFICANT CHANGE UP (ref 15–37)
AST SERPL-CCNC: 20 U/L — SIGNIFICANT CHANGE UP (ref 15–37)
BASOPHILS # BLD AUTO: 0.04 K/UL — SIGNIFICANT CHANGE UP (ref 0–0.2)
BASOPHILS # BLD AUTO: 0.05 K/UL — SIGNIFICANT CHANGE UP (ref 0–0.2)
BASOPHILS NFR BLD AUTO: 0.4 % — SIGNIFICANT CHANGE UP (ref 0–2)
BASOPHILS NFR BLD AUTO: 0.6 % — SIGNIFICANT CHANGE UP (ref 0–2)
BILIRUB SERPL-MCNC: 0.2 MG/DL — SIGNIFICANT CHANGE UP (ref 0.2–1.2)
BILIRUB SERPL-MCNC: 0.3 MG/DL — SIGNIFICANT CHANGE UP (ref 0.2–1.2)
BUN SERPL-MCNC: 30 MG/DL — HIGH (ref 7–23)
BUN SERPL-MCNC: 37 MG/DL — HIGH (ref 7–23)
CALCIUM SERPL-MCNC: 8.9 MG/DL — SIGNIFICANT CHANGE UP (ref 8.5–10.1)
CALCIUM SERPL-MCNC: 9 MG/DL — SIGNIFICANT CHANGE UP (ref 8.5–10.1)
CHLORIDE SERPL-SCNC: 110 MMOL/L — HIGH (ref 96–108)
CHLORIDE SERPL-SCNC: 112 MMOL/L — HIGH (ref 96–108)
CO2 SERPL-SCNC: 24 MMOL/L — SIGNIFICANT CHANGE UP (ref 22–31)
CO2 SERPL-SCNC: 25 MMOL/L — SIGNIFICANT CHANGE UP (ref 22–31)
CREAT SERPL-MCNC: 1.8 MG/DL — HIGH (ref 0.5–1.3)
CREAT SERPL-MCNC: 2.1 MG/DL — HIGH (ref 0.5–1.3)
EGFR: 32 ML/MIN/1.73M2 — LOW
EGFR: 39 ML/MIN/1.73M2 — LOW
EOSINOPHIL # BLD AUTO: 0.19 K/UL — SIGNIFICANT CHANGE UP (ref 0–0.5)
EOSINOPHIL # BLD AUTO: 0.26 K/UL — SIGNIFICANT CHANGE UP (ref 0–0.5)
EOSINOPHIL NFR BLD AUTO: 1.9 % — SIGNIFICANT CHANGE UP (ref 0–6)
EOSINOPHIL NFR BLD AUTO: 3.3 % — SIGNIFICANT CHANGE UP (ref 0–6)
GLUCOSE SERPL-MCNC: 117 MG/DL — HIGH (ref 70–99)
GLUCOSE SERPL-MCNC: 137 MG/DL — HIGH (ref 70–99)
HCT VFR BLD CALC: 31.1 % — LOW (ref 39–50)
HCT VFR BLD CALC: 33.5 % — LOW (ref 39–50)
HGB BLD-MCNC: 10.3 G/DL — LOW (ref 13–17)
HGB BLD-MCNC: 9.7 G/DL — LOW (ref 13–17)
IMM GRANULOCYTES NFR BLD AUTO: 0.3 % — SIGNIFICANT CHANGE UP (ref 0–1.5)
IMM GRANULOCYTES NFR BLD AUTO: 0.4 % — SIGNIFICANT CHANGE UP (ref 0–1.5)
LYMPHOCYTES # BLD AUTO: 1.47 K/UL — SIGNIFICANT CHANGE UP (ref 1–3.3)
LYMPHOCYTES # BLD AUTO: 1.5 K/UL — SIGNIFICANT CHANGE UP (ref 1–3.3)
LYMPHOCYTES # BLD AUTO: 14.8 % — SIGNIFICANT CHANGE UP (ref 13–44)
LYMPHOCYTES # BLD AUTO: 18.4 % — SIGNIFICANT CHANGE UP (ref 13–44)
MCHC RBC-ENTMCNC: 26.8 PG — LOW (ref 27–34)
MCHC RBC-ENTMCNC: 27 PG — SIGNIFICANT CHANGE UP (ref 27–34)
MCHC RBC-ENTMCNC: 30.7 GM/DL — LOW (ref 32–36)
MCHC RBC-ENTMCNC: 31.2 GM/DL — LOW (ref 32–36)
MCV RBC AUTO: 86.6 FL — SIGNIFICANT CHANGE UP (ref 80–100)
MCV RBC AUTO: 87.2 FL — SIGNIFICANT CHANGE UP (ref 80–100)
MONOCYTES # BLD AUTO: 0.66 K/UL — SIGNIFICANT CHANGE UP (ref 0–0.9)
MONOCYTES # BLD AUTO: 0.86 K/UL — SIGNIFICANT CHANGE UP (ref 0–0.9)
MONOCYTES NFR BLD AUTO: 8.3 % — SIGNIFICANT CHANGE UP (ref 2–14)
MONOCYTES NFR BLD AUTO: 8.5 % — SIGNIFICANT CHANGE UP (ref 2–14)
NEUTROPHILS # BLD AUTO: 5.53 K/UL — SIGNIFICANT CHANGE UP (ref 1.8–7.4)
NEUTROPHILS # BLD AUTO: 7.49 K/UL — HIGH (ref 1.8–7.4)
NEUTROPHILS NFR BLD AUTO: 69.1 % — SIGNIFICANT CHANGE UP (ref 43–77)
NEUTROPHILS NFR BLD AUTO: 74 % — SIGNIFICANT CHANGE UP (ref 43–77)
NRBC # BLD: 0 /100 WBCS — SIGNIFICANT CHANGE UP (ref 0–0)
NRBC # BLD: 0 /100 WBCS — SIGNIFICANT CHANGE UP (ref 0–0)
PLATELET # BLD AUTO: 249 K/UL — SIGNIFICANT CHANGE UP (ref 150–400)
PLATELET # BLD AUTO: 259 K/UL — SIGNIFICANT CHANGE UP (ref 150–400)
POTASSIUM SERPL-MCNC: 3.5 MMOL/L — SIGNIFICANT CHANGE UP (ref 3.5–5.3)
POTASSIUM SERPL-MCNC: 3.8 MMOL/L — SIGNIFICANT CHANGE UP (ref 3.5–5.3)
POTASSIUM SERPL-SCNC: 3.5 MMOL/L — SIGNIFICANT CHANGE UP (ref 3.5–5.3)
POTASSIUM SERPL-SCNC: 3.8 MMOL/L — SIGNIFICANT CHANGE UP (ref 3.5–5.3)
PROT SERPL-MCNC: 6.9 G/DL — SIGNIFICANT CHANGE UP (ref 6–8.3)
PROT SERPL-MCNC: 7.1 G/DL — SIGNIFICANT CHANGE UP (ref 6–8.3)
RBC # BLD: 3.59 M/UL — LOW (ref 4.2–5.8)
RBC # BLD: 3.84 M/UL — LOW (ref 4.2–5.8)
RBC # FLD: 14.6 % — HIGH (ref 10.3–14.5)
RBC # FLD: 14.8 % — HIGH (ref 10.3–14.5)
SARS-COV-2 RNA SPEC QL NAA+PROBE: SIGNIFICANT CHANGE UP
SODIUM SERPL-SCNC: 142 MMOL/L — SIGNIFICANT CHANGE UP (ref 135–145)
SODIUM SERPL-SCNC: 143 MMOL/L — SIGNIFICANT CHANGE UP (ref 135–145)
WBC # BLD: 10.12 K/UL — SIGNIFICANT CHANGE UP (ref 3.8–10.5)
WBC # BLD: 7.99 K/UL — SIGNIFICANT CHANGE UP (ref 3.8–10.5)
WBC # FLD AUTO: 10.12 K/UL — SIGNIFICANT CHANGE UP (ref 3.8–10.5)
WBC # FLD AUTO: 7.99 K/UL — SIGNIFICANT CHANGE UP (ref 3.8–10.5)

## 2022-06-07 PROCEDURE — 36415 COLL VENOUS BLD VENIPUNCTURE: CPT

## 2022-06-07 PROCEDURE — 99284 EMERGENCY DEPT VISIT MOD MDM: CPT | Mod: 25

## 2022-06-07 PROCEDURE — 85025 COMPLETE CBC W/AUTO DIFF WBC: CPT

## 2022-06-07 PROCEDURE — 80053 COMPREHEN METABOLIC PANEL: CPT

## 2022-06-07 PROCEDURE — 99284 EMERGENCY DEPT VISIT MOD MDM: CPT

## 2022-06-07 PROCEDURE — 96365 THER/PROPH/DIAG IV INF INIT: CPT

## 2022-06-07 PROCEDURE — 99285 EMERGENCY DEPT VISIT HI MDM: CPT

## 2022-06-07 RX ORDER — FAMOTIDINE 10 MG/ML
1 INJECTION INTRAVENOUS
Qty: 0 | Refills: 0 | DISCHARGE

## 2022-06-07 RX ORDER — SODIUM CHLORIDE 9 MG/ML
1000 INJECTION INTRAMUSCULAR; INTRAVENOUS; SUBCUTANEOUS ONCE
Refills: 0 | Status: COMPLETED | OUTPATIENT
Start: 2022-06-07 | End: 2022-06-07

## 2022-06-07 RX ORDER — LACTOBACILLUS ACIDOPHILUS 100MM CELL
1 CAPSULE ORAL
Qty: 0 | Refills: 0 | DISCHARGE

## 2022-06-07 RX ORDER — LABETALOL HCL 100 MG
10 TABLET ORAL ONCE
Refills: 0 | Status: COMPLETED | OUTPATIENT
Start: 2022-06-07 | End: 2022-06-07

## 2022-06-07 RX ORDER — ACETAMINOPHEN 500 MG
650 TABLET ORAL ONCE
Refills: 0 | Status: COMPLETED | OUTPATIENT
Start: 2022-06-07 | End: 2022-06-07

## 2022-06-07 RX ORDER — HYDRALAZINE HCL 50 MG
100 TABLET ORAL ONCE
Refills: 0 | Status: COMPLETED | OUTPATIENT
Start: 2022-06-07 | End: 2022-06-07

## 2022-06-07 RX ORDER — SENNA PLUS 8.6 MG/1
2 TABLET ORAL ONCE
Refills: 0 | Status: COMPLETED | OUTPATIENT
Start: 2022-06-07 | End: 2022-06-07

## 2022-06-07 RX ORDER — DOXAZOSIN MESYLATE 4 MG
1 TABLET ORAL
Qty: 0 | Refills: 0 | DISCHARGE

## 2022-06-07 RX ORDER — SODIUM CHLORIDE 9 MG/ML
500 INJECTION INTRAMUSCULAR; INTRAVENOUS; SUBCUTANEOUS ONCE
Refills: 0 | Status: COMPLETED | OUTPATIENT
Start: 2022-06-07 | End: 2022-06-07

## 2022-06-07 RX ORDER — POTASSIUM CHLORIDE 20 MEQ
1 PACKET (EA) ORAL
Qty: 0 | Refills: 0 | DISCHARGE

## 2022-06-07 RX ORDER — PREGABALIN 225 MG/1
1 CAPSULE ORAL
Qty: 0 | Refills: 0 | DISCHARGE

## 2022-06-07 RX ORDER — OXYBUTYNIN CHLORIDE 5 MG
1 TABLET ORAL
Qty: 0 | Refills: 0 | DISCHARGE

## 2022-06-07 RX ORDER — ACETAMINOPHEN 500 MG
1000 TABLET ORAL ONCE
Refills: 0 | Status: COMPLETED | OUTPATIENT
Start: 2022-06-07 | End: 2022-06-07

## 2022-06-07 RX ORDER — MORPHINE SULFATE 50 MG/1
4 CAPSULE, EXTENDED RELEASE ORAL ONCE
Refills: 0 | Status: DISCONTINUED | OUTPATIENT
Start: 2022-06-07 | End: 2022-06-07

## 2022-06-07 RX ORDER — BUDESONIDE AND FORMOTEROL FUMARATE DIHYDRATE 160; 4.5 UG/1; UG/1
2 AEROSOL RESPIRATORY (INHALATION)
Qty: 0 | Refills: 0 | DISCHARGE

## 2022-06-07 RX ORDER — DICLOFENAC SODIUM 30 MG/G
1 GEL TOPICAL
Qty: 0 | Refills: 0 | DISCHARGE

## 2022-06-07 RX ORDER — LAMOTRIGINE 25 MG/1
1 TABLET, ORALLY DISINTEGRATING ORAL
Qty: 0 | Refills: 0 | DISCHARGE

## 2022-06-07 RX ORDER — CARVEDILOL PHOSPHATE 80 MG/1
3.12 CAPSULE, EXTENDED RELEASE ORAL ONCE
Refills: 0 | Status: COMPLETED | OUTPATIENT
Start: 2022-06-07 | End: 2022-06-07

## 2022-06-07 RX ORDER — ASPIRIN/CALCIUM CARB/MAGNESIUM 324 MG
1 TABLET ORAL
Qty: 0 | Refills: 0 | DISCHARGE

## 2022-06-07 RX ADMIN — Medication 1000 MILLIGRAM(S): at 01:30

## 2022-06-07 RX ADMIN — CARVEDILOL PHOSPHATE 3.12 MILLIGRAM(S): 80 CAPSULE, EXTENDED RELEASE ORAL at 21:29

## 2022-06-07 RX ADMIN — Medication 400 MILLIGRAM(S): at 19:29

## 2022-06-07 RX ADMIN — SODIUM CHLORIDE 1000 MILLILITER(S): 9 INJECTION INTRAMUSCULAR; INTRAVENOUS; SUBCUTANEOUS at 02:00

## 2022-06-07 RX ADMIN — Medication 0.2 MILLIGRAM(S): at 21:30

## 2022-06-07 RX ADMIN — Medication 1000 MILLIGRAM(S): at 19:45

## 2022-06-07 RX ADMIN — Medication 100 MILLIGRAM(S): at 22:04

## 2022-06-07 RX ADMIN — Medication 1000 MILLIGRAM(S): at 02:00

## 2022-06-07 RX ADMIN — Medication 1000 MILLIGRAM(S): at 20:05

## 2022-06-07 RX ADMIN — MORPHINE SULFATE 4 MILLIGRAM(S): 50 CAPSULE, EXTENDED RELEASE ORAL at 15:45

## 2022-06-07 RX ADMIN — SODIUM CHLORIDE 500 MILLILITER(S): 9 INJECTION INTRAMUSCULAR; INTRAVENOUS; SUBCUTANEOUS at 13:42

## 2022-06-07 RX ADMIN — Medication 650 MILLIGRAM(S): at 13:37

## 2022-06-07 RX ADMIN — Medication 10 MILLIGRAM(S): at 17:41

## 2022-06-07 RX ADMIN — SODIUM CHLORIDE 500 MILLILITER(S): 9 INJECTION INTRAMUSCULAR; INTRAVENOUS; SUBCUTANEOUS at 15:00

## 2022-06-07 RX ADMIN — MORPHINE SULFATE 4 MILLIGRAM(S): 50 CAPSULE, EXTENDED RELEASE ORAL at 15:30

## 2022-06-07 RX ADMIN — Medication 650 MILLIGRAM(S): at 14:37

## 2022-06-07 RX ADMIN — SENNA PLUS 2 TABLET(S): 8.6 TABLET ORAL at 22:05

## 2022-06-07 RX ADMIN — Medication 100 MILLIGRAM(S): at 15:50

## 2022-06-07 RX ADMIN — Medication 400 MILLIGRAM(S): at 01:32

## 2022-06-07 RX ADMIN — SODIUM CHLORIDE 1000 MILLILITER(S): 9 INJECTION INTRAMUSCULAR; INTRAVENOUS; SUBCUTANEOUS at 01:33

## 2022-06-07 NOTE — ED ADULT TRIAGE NOTE - HISTORY OF COVID-19 VACCINATION
PROCEDURE: MRI lumbar spine.



TECHNIQUE: Multiplanar, multisequence MRI of the lumbar spine

was performed without contrast.



INDICATION: Low back pain.



FINDINGS: The alignment of the lumbar spine is satisfactory. The

vertebral body heights are preserved. Disc heights are also

preserved. There is minimal disc desiccation at the L2-L3 level.



The cauda equina and conus medullaris appear grossly

unremarkable. No focal suspicious lesion in the marrow and no

significant marrow signal abnormality is seen.



There is mild facet joint hypertrophy in the mid to lower lumbar

spine levels with minimal effusion seen at L4-L5 facet joint on

the right side and minimal soft tissue edema around the left

L4-L5 facet joint.



No disc herniation is seen at any level.



There is no central canal, lateral recess, or foraminal stenosis

seen at any level.



IMPRESSION: Mild facet arthropathy at L4-L5 and to a lesser

extent at L3-L4 levels. No disc herniation at any level. No

central canal, lateral recess, or foraminal stenosis.



Dictated by:



Dictated on workstation # [id number] Yes

## 2022-06-07 NOTE — ED ADULT NURSE NOTE - NSICDXPASTMEDICALHX_GEN_ALL_CORE_FT
PAST MEDICAL HISTORY:  Afib on AC    Anemia of chronic disease Monoclonal Gammopathy-MGUS  pRBC transfusion 10/15/21    Anxiety Depression  multiple psych medications    Atrial fibrillation first documented on EKG 10/7/2021    CAD (coronary artery disease) s/p stents (not on plavix)    Chronic diastolic congestive heart failure     Diverticulosis c/b GIB in 2020    H/O: upper GI bleed 4/22, small Bowel Bleed, 5 u pRBC Transfusion 4/22  S/P Colonoscopy- Diverticulosis  S/P Capsule Endoscopy done -[facility] -No active bleeding    History of diverticulitis 07/2021    HLD (hyperlipidemia)     HTN (hypertension) c/b multiple episodes of hypertensive urgency    Multiple thyroid nodules     Stage 3 chronic kidney disease     Type 2 diabetes mellitus not on home insulin/ Meds

## 2022-06-07 NOTE — ED ADULT NURSE NOTE - OBJECTIVE STATEMENT
Pt. received alert and oriented x3 with chief complaint of bilateral knee pain and generalized lower back pain post third fall within 24 hours. Pt. seen in ER overnight with same complaints. Pt. presents w/ bruising to left lateral lower back and bilateral knees. Pt. also presents w/ swelling to bilateral knees.
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
## 2022-06-07 NOTE — PHYSICAL THERAPY INITIAL EVALUATION ADULT - GAIT TRAINING, PT EVAL
Patient will ambulate x 100 feet with appropriate device independently in 2 weeks in order to increase safety at home

## 2022-06-07 NOTE — ED PROVIDER NOTE - OBJECTIVE STATEMENT
78yo male bib ems with weakness tonight. pt states he fell walking to the bathroom and when he got up he felt weak and fell again, no head injury no loc, pt c/o b/l leg pain and weaknss, no abd pain no chest pain no other complaints
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
## 2022-06-07 NOTE — ED PROVIDER NOTE - NSFOLLOWUPINSTRUCTIONS_ED_ALL_ED_FT
Weakness      Weakness is a lack of strength. You may feel weak all over your body (generalized), or you may feel weak in one part of your body (focal). There are many potential causes of weakness. Sometimes, the cause of your weakness may not be known. Some causes of weakness can be serious, so it is important to see your doctor.      Follow these instructions at home:    Activity     •Rest as needed.      •Try to get enough sleep. Most adults need 7–8 hours of sleep each night. Talk to your doctor about how much sleep you need each night.      •Do exercises, such as arm curls and leg raises, for 30 minutes at least 2 days a week or as told by your doctor.      •Think about working with a physical therapist or  to help you get stronger.        General instructions      •Take over-the-counter and prescription medicines only as told by your doctor.    •Eat a healthy, well-balanced diet. This includes:  •Proteins to build muscles, such as lean meats and fish.      •Fresh fruits and vegetables.      •Carbohydrates to boost energy, such as whole grains.        •Drink enough fluid to keep your pee (urine) pale yellow.      •Keep all follow-up visits as told by your doctor. This is important.        Contact a doctor if:    •Your weakness does not get better or it gets worse.    •Your weakness affects your ability to:  •Think clearly.      •Do your normal daily activities.          Get help right away if you:    •Have sudden weakness on one side of your face or body.      •Have chest pain.      •Have trouble breathing or shortness of breath.      •Have problems with your vision.      •Have trouble talking or swallowing.      •Have trouble standing or walking.      •Are light-headed.      •Pass out (lose consciousness).        Summary    •Weakness is a lack of strength. You may feel weak all over your body or just in one part of your body.      •There are many potential causes of weakness. Sometimes, the cause of your weakness may not be known.      •Rest as needed, and try to get enough sleep. Most adults need 7–8 hours of sleep each night.      •Eat a healthy, well-balanced diet.      This information is not intended to replace advice given to you by your health care provider. Make sure you discuss any questions you have with your health care provider.      Document Revised: 07/24/2019 Document Reviewed: 07/24/2019

## 2022-06-07 NOTE — ED PROVIDER NOTE - OBJECTIVE STATEMENT
78 yo M PMHx HTN, Afib, stage 3 CKD, CAD, DM presents to ED c/o bilateral knee pain s/p this morning. Pt was seen here early this AM after mechanical fall 2/2 generalized weakness, states legs gave out and  he fell onto both knees denies head trauma at the time. Pt had basic labs done, slightly dehydrated, was treated with Tylenol and IVF with improvement then was dc'ed home. Pt states he fell again this AM in the bathroom- again legs gave out, unsure if he hit his head this time but denies LOC. Roommate helped him up and called EMS. Pt now only c/o bilateral knee pain. Pt denies HA, neck pain, dizziness, chest pain, SOB, back pain, abd pain. 78 yo M PMHx HTN, Afib, stage 3 CKD, CAD, DM presents to ED from group home c/o bilateral knee pain s/p this morning. Pt was seen here early this AM after mechanical fall 2/2 generalized weakness, states legs gave out and  he fell onto both knees denies head trauma at the time. Pt had basic labs done, slightly dehydrated, was treated with Tylenol and IVF with improvement then was dc'ed home. Pt states he fell again this AM in the bathroom- again legs gave out, unsure if he hit his head this time but denies LOC. Roommate helped him up and called EMS. Pt now only c/o bilateral knee pain. Pt denies HA, neck pain, dizziness, chest pain, SOB, back pain, abd pain.

## 2022-06-07 NOTE — ED PROVIDER NOTE - CARE PLAN
Principal Discharge DX:	Frequent falls   1 Principal Discharge DX:	Frequent falls  Secondary Diagnosis:	Acute bilateral knee pain

## 2022-06-07 NOTE — ED ADULT NURSE NOTE - OBJECTIVE STATEMENT
Pt to ED via EMS, states he fell at group home and was unable to stand back up, states he fell again when he was assisted to an upright position. Pt c/o pain down both of his legs x 3 days.

## 2022-06-07 NOTE — ED PROVIDER NOTE - NS ED ATTENDING STATEMENT MOD
This was a shared visit with the ABHINAV. I reviewed and verified the documentation and independently performed the documented:

## 2022-06-07 NOTE — ED PROVIDER NOTE - PATIENT PORTAL LINK FT
You can access the FollowMyHealth Patient Portal offered by [facility] by registering at the following website: [email or web address]. By joining [employer]’s FollowMyHealth portal, you will also be able to view your health information using other applications (apps) compatible with our system.

## 2022-06-07 NOTE — PHYSICAL THERAPY INITIAL EVALUATION ADULT - ADDITIONAL COMMENTS
Patient lives in private home, +9 BRYAN then resides on main level.  Patient reports he was independent in ADLs and ambulates independently without device.  Patient has access to [facility] an RW.

## 2022-06-07 NOTE — ED PROVIDER NOTE - PROGRESS NOTE DETAILS
PT recommends rehab. Spoke with Nahed QURESHI unable to find placement for patient today but will able to do so in the AM. Will hold pt in ED tonight for SW placement in AM pts nighttime meds ordered as per med rec that accompanied patient

## 2022-06-07 NOTE — ED PROVIDER NOTE - PROGRESS NOTE DETAILS
pt reevaluted, feeling better, able to get up and ambulate with walker, pt to be d/c home follow up with pmd, return if any symptoms worsen

## 2022-06-07 NOTE — PHYSICAL THERAPY INITIAL EVALUATION ADULT - PERTINENT HX OF CURRENT PROBLEM, REHAB EVAL
78 yo M PMHx HTN, Afib, stage 3 CKD, CAD, DM presents to ED c/o bilateral knee pain s/p this morning. Pt was seen here early this AM after mechanical fall 2/2 generalized weakness, states legs gave out and  he fell onto both knees denies head trauma at the time.

## 2022-06-07 NOTE — ED PROVIDER NOTE - EXTREMITY EXAM
bruising and swelling noted to both knees L>R, left knee with superficial abrasion, decreased ROM both knees 2/2 pain; bruising noted to left upper back, no midline tenderness

## 2022-06-07 NOTE — ED ADULT NURSE NOTE - INTERVENTIONS DEFINITIONS
[place] to call system/Call bell, personal items and telephone within reach/Instruct patient to call for assistance/Non-slip footwear when patient is off stretcher/Physically safe environment: no spills, clutter or unnecessary equipment/Stretcher in lowest position, wheels locked, appropriate side rails in place/Monitor gait and stability

## 2022-06-07 NOTE — ED PROVIDER NOTE - ATTENDING APP SHARED VISIT CONTRIBUTION OF CARE
Patient came in secondary to fall at home. patient fell last night at home, seen and evaluated in the ED and d/c home. repeat fall at home and came back. unsure if he hit his head. denies LOC. denies any complaints. +myalgias x few days. +nausea. no cough. no FUD.     Alert, mild confusion. PERRLx2. Lungs CTA, equal and b/l, no r/r/w. S1S2, no murmurs, RRR. Abdomen soft, nt/nd. +PMSx4. +bruising on knees. +ecchymosis b/l flanks.     unable to give IV contrast secondary to CRI. CT brain/c-spine/chest/abd/pelvis

## 2022-06-08 DIAGNOSIS — I10 ESSENTIAL (PRIMARY) HYPERTENSION: ICD-10-CM

## 2022-06-08 DIAGNOSIS — N40.0 BENIGN PROSTATIC HYPERPLASIA WITHOUT LOWER URINARY TRACT SYMPTOMS: ICD-10-CM

## 2022-06-08 DIAGNOSIS — D64.9 ANEMIA, UNSPECIFIED: ICD-10-CM

## 2022-06-08 DIAGNOSIS — I50.32 CHRONIC DIASTOLIC (CONGESTIVE) HEART FAILURE: ICD-10-CM

## 2022-06-08 DIAGNOSIS — I48.91 UNSPECIFIED ATRIAL FIBRILLATION: ICD-10-CM

## 2022-06-08 DIAGNOSIS — N18.30 CHRONIC KIDNEY DISEASE, STAGE 3 UNSPECIFIED: ICD-10-CM
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
## 2022-06-08 DIAGNOSIS — I25.10 ATHEROSCLEROTIC HEART DISEASE OF NATIVE CORONARY ARTERY WITHOUT ANGINA PECTORIS: ICD-10-CM

## 2022-06-08 DIAGNOSIS — E78.5 HYPERLIPIDEMIA, UNSPECIFIED: ICD-10-CM

## 2022-06-08 DIAGNOSIS — Z29.9 ENCOUNTER FOR PROPHYLACTIC MEASURES, UNSPECIFIED: ICD-10-CM

## 2022-06-08 DIAGNOSIS — E11.9 TYPE 2 DIABETES MELLITUS WITHOUT COMPLICATIONS: ICD-10-CM

## 2022-06-08 RX ORDER — FAMOTIDINE 10 MG/ML
20 INJECTION INTRAVENOUS DAILY
Refills: 0 | Status: DISCONTINUED | OUTPATIENT
Start: 2022-06-08 | End: 2022-06-09

## 2022-06-08 RX ORDER — ASPIRIN/CALCIUM CARB/MAGNESIUM 324 MG
81 TABLET ORAL DAILY
Refills: 0 | Status: DISCONTINUED | OUTPATIENT
Start: 2022-06-08 | End: 2022-06-09

## 2022-06-08 RX ORDER — HYDRALAZINE HCL 50 MG
50 TABLET ORAL THREE TIMES A DAY
Refills: 0 | Status: DISCONTINUED | OUTPATIENT
Start: 2022-06-08 | End: 2022-06-09

## 2022-06-08 RX ORDER — SODIUM CHLORIDE 9 MG/ML
1000 INJECTION, SOLUTION INTRAVENOUS
Refills: 0 | Status: DISCONTINUED | OUTPATIENT
Start: 2022-06-08 | End: 2022-06-09

## 2022-06-08 RX ORDER — BUDESONIDE AND FORMOTEROL FUMARATE DIHYDRATE 160; 4.5 UG/1; UG/1
2 AEROSOL RESPIRATORY (INHALATION)
Refills: 0 | Status: DISCONTINUED | OUTPATIENT
Start: 2022-06-08 | End: 2022-06-09

## 2022-06-08 RX ORDER — LIDOCAINE 4 G/100G
1 CREAM TOPICAL DAILY
Refills: 0 | Status: DISCONTINUED | OUTPATIENT
Start: 2022-06-08 | End: 2022-06-09

## 2022-06-08 RX ORDER — SENNA PLUS 8.6 MG/1
2 TABLET ORAL AT BEDTIME
Refills: 0 | Status: DISCONTINUED | OUTPATIENT
Start: 2022-06-08 | End: 2022-06-09

## 2022-06-08 RX ORDER — IBUPROFEN 200 MG
400 TABLET ORAL ONCE
Refills: 0 | Status: COMPLETED | OUTPATIENT
Start: 2022-06-08 | End: 2022-06-08

## 2022-06-08 RX ORDER — FUROSEMIDE 40 MG
40 TABLET ORAL DAILY
Refills: 0 | Status: DISCONTINUED | OUTPATIENT
Start: 2022-06-08 | End: 2022-06-09

## 2022-06-08 RX ORDER — LAMOTRIGINE 25 MG/1
100 TABLET, ORALLY DISINTEGRATING ORAL DAILY
Refills: 0 | Status: DISCONTINUED | OUTPATIENT
Start: 2022-06-08 | End: 2022-06-09

## 2022-06-08 RX ORDER — HYDRALAZINE HCL 50 MG
100 TABLET ORAL ONCE
Refills: 0 | Status: COMPLETED | OUTPATIENT
Start: 2022-06-08 | End: 2022-06-08

## 2022-06-08 RX ORDER — GLUCAGON INJECTION, SOLUTION 0.5 MG/.1ML
1 INJECTION, SOLUTION SUBCUTANEOUS ONCE
Refills: 0 | Status: DISCONTINUED | OUTPATIENT
Start: 2022-06-08 | End: 2022-06-09

## 2022-06-08 RX ORDER — INSULIN LISPRO 100/ML
VIAL (ML) SUBCUTANEOUS
Refills: 0 | Status: DISCONTINUED | OUTPATIENT
Start: 2022-06-08 | End: 2022-06-09

## 2022-06-08 RX ORDER — DEXTROSE 50 % IN WATER 50 %
15 SYRINGE (ML) INTRAVENOUS ONCE
Refills: 0 | Status: DISCONTINUED | OUTPATIENT
Start: 2022-06-08 | End: 2022-06-09

## 2022-06-08 RX ORDER — OXYCODONE AND ACETAMINOPHEN 5; 325 MG/1; MG/1
1 TABLET ORAL EVERY 6 HOURS
Refills: 0 | Status: DISCONTINUED | OUTPATIENT
Start: 2022-06-08 | End: 2022-06-09

## 2022-06-08 RX ORDER — POTASSIUM CHLORIDE 20 MEQ
20 PACKET (EA) ORAL DAILY
Refills: 0 | Status: DISCONTINUED | OUTPATIENT
Start: 2022-06-08 | End: 2022-06-09

## 2022-06-08 RX ORDER — OXYBUTYNIN CHLORIDE 5 MG
15 TABLET ORAL DAILY
Refills: 0 | Status: DISCONTINUED | OUTPATIENT
Start: 2022-06-08 | End: 2022-06-09

## 2022-06-08 RX ORDER — AMLODIPINE BESYLATE 2.5 MG/1
10 TABLET ORAL DAILY
Refills: 0 | Status: DISCONTINUED | OUTPATIENT
Start: 2022-06-08 | End: 2022-06-09

## 2022-06-08 RX ORDER — ATORVASTATIN CALCIUM 80 MG/1
10 TABLET, FILM COATED ORAL AT BEDTIME
Refills: 0 | Status: DISCONTINUED | OUTPATIENT
Start: 2022-06-08 | End: 2022-06-09

## 2022-06-08 RX ORDER — ONDANSETRON 8 MG/1
4 TABLET, FILM COATED ORAL EVERY 8 HOURS
Refills: 0 | Status: DISCONTINUED | OUTPATIENT
Start: 2022-06-08 | End: 2022-06-09

## 2022-06-08 RX ORDER — FAMOTIDINE 10 MG/ML
40 INJECTION INTRAVENOUS
Refills: 0 | Status: DISCONTINUED | OUTPATIENT
Start: 2022-06-08 | End: 2022-06-08

## 2022-06-08 RX ORDER — FOLIC ACID 0.8 MG
1 TABLET ORAL DAILY
Refills: 0 | Status: DISCONTINUED | OUTPATIENT
Start: 2022-06-08 | End: 2022-06-09

## 2022-06-08 RX ORDER — TRAMADOL HYDROCHLORIDE 50 MG/1
25 TABLET ORAL EVERY 6 HOURS
Refills: 0 | Status: DISCONTINUED | OUTPATIENT
Start: 2022-06-08 | End: 2022-06-09

## 2022-06-08 RX ORDER — INSULIN LISPRO 100/ML
VIAL (ML) SUBCUTANEOUS AT BEDTIME
Refills: 0 | Status: DISCONTINUED | OUTPATIENT
Start: 2022-06-08 | End: 2022-06-09

## 2022-06-08 RX ORDER — DOXAZOSIN MESYLATE 4 MG
4 TABLET ORAL AT BEDTIME
Refills: 0 | Status: DISCONTINUED | OUTPATIENT
Start: 2022-06-08 | End: 2022-06-09

## 2022-06-08 RX ORDER — LANOLIN ALCOHOL/MO/W.PET/CERES
3 CREAM (GRAM) TOPICAL AT BEDTIME
Refills: 0 | Status: DISCONTINUED | OUTPATIENT
Start: 2022-06-08 | End: 2022-06-09

## 2022-06-08 RX ORDER — VENLAFAXINE HCL 75 MG
150 CAPSULE, EXT RELEASE 24 HR ORAL DAILY
Refills: 0 | Status: DISCONTINUED | OUTPATIENT
Start: 2022-06-08 | End: 2022-06-09

## 2022-06-08 RX ORDER — AMLODIPINE BESYLATE 2.5 MG/1
10 TABLET ORAL ONCE
Refills: 0 | Status: COMPLETED | OUTPATIENT
Start: 2022-06-08 | End: 2022-06-08

## 2022-06-08 RX ORDER — POLYETHYLENE GLYCOL 3350 17 G/17G
17 POWDER, FOR SOLUTION ORAL
Refills: 0 | Status: DISCONTINUED | OUTPATIENT
Start: 2022-06-08 | End: 2022-06-09

## 2022-06-08 RX ORDER — CARVEDILOL PHOSPHATE 80 MG/1
3.12 CAPSULE, EXTENDED RELEASE ORAL EVERY 12 HOURS
Refills: 0 | Status: DISCONTINUED | OUTPATIENT
Start: 2022-06-08 | End: 2022-06-09

## 2022-06-08 RX ORDER — ACETAMINOPHEN 500 MG
650 TABLET ORAL EVERY 6 HOURS
Refills: 0 | Status: DISCONTINUED | OUTPATIENT
Start: 2022-06-08 | End: 2022-06-09

## 2022-06-08 RX ORDER — OXYBUTYNIN CHLORIDE 5 MG
15 TABLET ORAL DAILY
Refills: 0 | Status: DISCONTINUED | OUTPATIENT
Start: 2022-06-08 | End: 2022-06-08

## 2022-06-08 RX ORDER — PREGABALIN 225 MG/1
1000 CAPSULE ORAL DAILY
Refills: 0 | Status: DISCONTINUED | OUTPATIENT
Start: 2022-06-08 | End: 2022-06-09

## 2022-06-08 RX ORDER — ISOSORBIDE MONONITRATE 60 MG/1
60 TABLET, EXTENDED RELEASE ORAL DAILY
Refills: 0 | Status: DISCONTINUED | OUTPATIENT
Start: 2022-06-08 | End: 2022-06-09

## 2022-06-08 RX ORDER — ARIPIPRAZOLE 15 MG/1
2 TABLET ORAL DAILY
Refills: 0 | Status: DISCONTINUED | OUTPATIENT
Start: 2022-06-08 | End: 2022-06-09

## 2022-06-08 RX ORDER — DEXTROSE 50 % IN WATER 50 %
25 SYRINGE (ML) INTRAVENOUS ONCE
Refills: 0 | Status: DISCONTINUED | OUTPATIENT
Start: 2022-06-08 | End: 2022-06-09

## 2022-06-08 RX ADMIN — Medication 50 MILLIGRAM(S): at 23:14

## 2022-06-08 RX ADMIN — Medication 3 MILLIGRAM(S): at 23:15

## 2022-06-08 RX ADMIN — Medication 100 MILLIGRAM(S): at 07:37

## 2022-06-08 RX ADMIN — Medication 0: at 23:06

## 2022-06-08 RX ADMIN — AMLODIPINE BESYLATE 10 MILLIGRAM(S): 2.5 TABLET ORAL at 12:13

## 2022-06-08 RX ADMIN — Medication 4 MILLIGRAM(S): at 23:15

## 2022-06-08 RX ADMIN — Medication 400 MILLIGRAM(S): at 17:34

## 2022-06-08 RX ADMIN — SENNA PLUS 2 TABLET(S): 8.6 TABLET ORAL at 23:15

## 2022-06-08 RX ADMIN — ATORVASTATIN CALCIUM 10 MILLIGRAM(S): 80 TABLET, FILM COATED ORAL at 23:15

## 2022-06-08 RX ADMIN — Medication 0.2 MILLIGRAM(S): at 07:08

## 2022-06-08 NOTE — ED ADULT NURSE REASSESSMENT NOTE - NS ED NURSE REASSESS COMMENT FT1
Called Social Work and spoke with Aure.  She states she is working on trying to find placement at Rehab for this patient.

## 2022-06-08 NOTE — ED ADULT NURSE REASSESSMENT NOTE - NS ED NURSE REASSESS COMMENT FT1
Patient and report received at 0710.  Patient is aleret and oriented x 4, he states he has been experiencing frequent falls at home.  He is awaiting a social work consult for placement at rehab.  Blood pressure is currently trending down.  Hygiene provided, call bell is within reach. Patient and report received at 0710.  Patient is alert and oriented x 4, he states he has been experiencing frequent falls at home.  He is awaiting a social work consult for placement at rehab.  Blood pressure is currently trending down.  Hygiene provided, call bell is within reach.

## 2022-06-08 NOTE — H&P ADULT - PROBLEM SELECTOR PLAN 5
baseline per chart review ~1.9  - Cr at 1.9 today, currently at baseline.  -Trend BUN/Cr daily.  -Use nephrotoxic medications sparingly.

## 2022-06-08 NOTE — H&P ADULT - PROBLEM SELECTOR PLAN 9
Diabetes type II   -HbA1c is 5.8 on last admission  Hold oral hypoglycemic meds  Insulin Corrective Scale  Finger sticks per routine  Consistent Carb Diet  Hypoglycemia protocol

## 2022-06-08 NOTE — H&P ADULT - PROBLEM SELECTOR PLAN 3
- Continue home imdur, Clonidine, Hydralazine, Coreg, Amlodipine and  Lasix  daily   - Dash Diet  - Monitor routine hemodynamics.

## 2022-06-08 NOTE — H&P ADULT - PROBLEM SELECTOR PLAN 6
Chronic, HR stable   Continue Coreg  2x day  Holding Eliquis /ASA in setting of hematuria, f/u Cardio recs pt is NOT a candidate for AC 2/2 GI Bleed &  Bleed.

## 2022-06-08 NOTE — H&P ADULT - ATTENDING COMMENTS
77 y.o. M with a PMH of Afib (not on eliquis after GIB), MGUS, depression, CAD s/p stents, HLD, HTN, CKD, DM2, diverticulosis, Anemia , Diastolic  CHF (Echo 1/22 LVEF 55%) and BPH who is being admitted after fall for placement    Multiple falls at home, PT recommending Rehab, social work working on placement, pending authorization.    Santos Adan, Attending Physician

## 2022-06-08 NOTE — H&P ADULT - PROBLEM SELECTOR PLAN 1
s/p Fall yesterday AM.  -Fall + Safety precautions.  -PRN pain control as ordered.  -PT following.  - consult for placement to [facility].

## 2022-06-08 NOTE — ED ADULT NURSE REASSESSMENT NOTE - NS ED NURSE REASSESS COMMENT FT1
Patient awake and alert, sitting on stretcher in room 15, he states partial relief of pain with motrin.  Call bell is within reach.

## 2022-06-08 NOTE — H&P ADULT - PROBLEM SELECTOR PLAN 7
-Continue home Lasix 40 mg daily ,  -Coreg 2x day   -Dash/ TLC Diet  - Cardio DR. Bliss's group following.

## 2022-06-08 NOTE — H&P ADULT - NSHPPHYSICALEXAM_GEN_ALL_CORE
T(C): 36.3 (06-08-22 @ 13:09), Max: 36.7 (06-07-22 @ 21:34)  HR: 76 (06-08-22 @ 17:00) (65 - 77)  BP: 181/84 (06-08-22 @ 17:00) (179/86 - 216/99)  RR: 16 (06-08-22 @ 17:00) (16 - 19)  SpO2: 97% (06-08-22 @ 17:00) (96% - 99%)    GENERAL: patient appears well, no acute distress, appropriate, pleasant  EYES: sclera clear, no exudates  ENMT: oropharynx clear without erythema, no exudates, moist mucous membranes  NECK: supple, soft, no thyromegaly noted  LUNGS: good air entry bilaterally, clear to auscultation, symmetric breath sounds, no wheezing or rhonchi appreciated  HEART: soft S1/S2, regular rate and rhythm, cardiac murmur noted  GASTROINTESTINAL: abdomen is soft, nontender, nondistended, normoactive bowel sounds, no palpable masses  INTEGUMENT: good skin turgor, warm skin, appears well perfused  MUSCULOSKELETAL: Left Knee ecchymoses. B/l knees mildly edematous.  NEUROLOGIC: awake, alert, oriented x3, good muscle tone in 4 extremities, no obvious sensory deficits

## 2022-06-08 NOTE — H&P ADULT - ASSESSMENT
Pt is a 77 y.o. M with a PMH of Afib (not on eliquis after GIB), MGUS, depression, CAD s/p stents, HLD, HTN, CKD, DM2, diverticulosis, Anemia , Diastolic  CHF (Echo 1/22 LVEF 55%) and BPH who is being admitted after fall for placement.

## 2022-06-08 NOTE — H&P ADULT - PROBLEM SELECTOR PLAN 2
Chronic Anemia   - History of GIB and hematuria requiring multiple units of blood on past admissions.  - Hb 9.7 this AM   - No sign or symptoms of active bleeding.  - Continue to trend CBC daily.

## 2022-06-08 NOTE — H&P ADULT - NSHPREVIEWOFSYSTEMS_GEN_ALL_CORE
Constitutional: denies fever, chills, sweating  HEENT: denies headache, dizziness, or lightheadedness  Respiratory: denies SOB, cough, or wheezing  Cardiovascular: denies CP, palpitations  Gastrointestinal: denies nausea, vomiting, diarrhea, constipation, abdominal pain, or bloody stools  Genitourinary: denies painful urination, increased frequency, urgency, or bloody urine  Skin/Breast: denies rashes or itching  Musculoskeletal: +LE weakness. B/L KNEE PAIN  Neurologic: denies loss of sensation, numbness, or tingling  ROS negative except as noted above

## 2022-06-08 NOTE — H&P ADULT - HISTORY OF PRESENT ILLNESS
76 yo M PMHx 77M with PMH of Afib (not on eliquis after GIB), MGUS, depression, CAD s/p stents, HLD, HTN, CKD, DM2, diverticulosis, Anemia , Diastolic  CHF (Echo 1/22 LVEF 55%) and BPH with recent GI Bleed 4/22 with multiple pRBC transfusions recently d/chio from [facility] 4/11/22, then DCd from [facility] on 4/19 for hematuria, presents to ED from group home c/o bilateral knee pain s/p this morning. Pt was seen here early yesterday AM after mechanical fall 2/2 generalized weakness, states legs gave out and  he fell onto both knees denies head trauma at the time. Pt had basic labs done, slightly dehydrated, was treated with Tylenol and IVF with improvement then was dc'ed home. Pt states he fell again later in the morning in the bathroom- again legs gave out, unsure if he hit his head this time but denies LOC. B/l knee pain is 10/10 with movement, but 5/10 at rest. Roommate helped him up and called EMS. Pt now only c/o bilateral knee pain. Pt denies HA, neck pain, dizziness, chest pain, SOB, back pain, abd pain.    In the ED:  T 97.4F HR 76 /84 RR 16 SpO2 97%  Hgb 9.7 BUN 30 Cr 1.8   CT Head + C-Spine: No acute findings.  CT Chest, AP: Few scattered 2 mm pulmonary nodules are stable. Follow-up chest CT can be obtained in 12 months based on patient risk factors.  Knee Xrays: Small b/l suprapatellar effusions.  ECG: Sinus rhythm with 1st degree AV block. LVH with QRS widening and repolarization abnormality ( R in aVL , [place] product ). Nonspecific T wave abnormality now evident in Anterior leads. T wave inversion less evident in Lateral leads. QT shortened.

## 2022-06-08 NOTE — ED ADULT NURSE REASSESSMENT NOTE - NS ED NURSE REASSESS COMMENT FT1
Patient's blood pressure remains high, Dr. Del Real aware, and she gave verbal order for amlodipine.  Verified with patient that he still takes amlodipine.

## 2022-06-09 ENCOUNTER — TRANSCRIPTION ENCOUNTER (OUTPATIENT)
Age: 77
End: 2022-06-09

## 2022-06-09 VITALS
RESPIRATION RATE: 17 BRPM | HEART RATE: 73 BPM | SYSTOLIC BLOOD PRESSURE: 169 MMHG | OXYGEN SATURATION: 97 % | TEMPERATURE: 98 F | DIASTOLIC BLOOD PRESSURE: 91 MMHG

## 2022-06-09 DIAGNOSIS — R29.6 REPEATED FALLS: ICD-10-CM

## 2022-06-09 LAB
A1C WITH ESTIMATED AVERAGE GLUCOSE RESULT: 5.7 % — HIGH (ref 4–5.6)
ALBUMIN SERPL ELPH-MCNC: 3.1 G/DL — LOW (ref 3.3–5)
ALP SERPL-CCNC: 62 U/L — SIGNIFICANT CHANGE UP (ref 40–120)
ALT FLD-CCNC: 17 U/L — SIGNIFICANT CHANGE UP (ref 12–78)
ANION GAP SERPL CALC-SCNC: 9 MMOL/L — SIGNIFICANT CHANGE UP (ref 5–17)
AST SERPL-CCNC: 17 U/L — SIGNIFICANT CHANGE UP (ref 15–37)
BILIRUB SERPL-MCNC: 0.4 MG/DL — SIGNIFICANT CHANGE UP (ref 0.2–1.2)
BUN SERPL-MCNC: 27 MG/DL — HIGH (ref 7–23)
CALCIUM SERPL-MCNC: 9.3 MG/DL — SIGNIFICANT CHANGE UP (ref 8.5–10.1)
CHLORIDE SERPL-SCNC: 106 MMOL/L — SIGNIFICANT CHANGE UP (ref 96–108)
CO2 SERPL-SCNC: 24 MMOL/L — SIGNIFICANT CHANGE UP (ref 22–31)
CREAT SERPL-MCNC: 1.7 MG/DL — HIGH (ref 0.5–1.3)
EGFR: 41 ML/MIN/1.73M2 — LOW
ESTIMATED AVERAGE GLUCOSE: 117 MG/DL — HIGH (ref 68–114)
GLUCOSE SERPL-MCNC: 141 MG/DL — HIGH (ref 70–99)
HCT VFR BLD CALC: 29.9 % — LOW (ref 39–50)
HGB BLD-MCNC: 9.6 G/DL — LOW (ref 13–17)
MCHC RBC-ENTMCNC: 27.5 PG — SIGNIFICANT CHANGE UP (ref 27–34)
MCHC RBC-ENTMCNC: 32.1 GM/DL — SIGNIFICANT CHANGE UP (ref 32–36)
MCV RBC AUTO: 85.7 FL — SIGNIFICANT CHANGE UP (ref 80–100)
NRBC # BLD: 0 /100 WBCS — SIGNIFICANT CHANGE UP (ref 0–0)
PLATELET # BLD AUTO: 232 K/UL — SIGNIFICANT CHANGE UP (ref 150–400)
POTASSIUM SERPL-MCNC: 3.3 MMOL/L — LOW (ref 3.5–5.3)
POTASSIUM SERPL-SCNC: 3.3 MMOL/L — LOW (ref 3.5–5.3)
PROT SERPL-MCNC: 6.6 G/DL — SIGNIFICANT CHANGE UP (ref 6–8.3)
RBC # BLD: 3.49 M/UL — LOW (ref 4.2–5.8)
RBC # FLD: 14.6 % — HIGH (ref 10.3–14.5)
SODIUM SERPL-SCNC: 139 MMOL/L — SIGNIFICANT CHANGE UP (ref 135–145)
WBC # BLD: 7.77 K/UL — SIGNIFICANT CHANGE UP (ref 3.8–10.5)
WBC # FLD AUTO: 7.77 K/UL — SIGNIFICANT CHANGE UP (ref 3.8–10.5)

## 2022-06-09 PROCEDURE — 82962 GLUCOSE BLOOD TEST: CPT

## 2022-06-09 PROCEDURE — 36415 COLL VENOUS BLD VENIPUNCTURE: CPT

## 2022-06-09 PROCEDURE — 94640 AIRWAY INHALATION TREATMENT: CPT

## 2022-06-09 PROCEDURE — 96375 TX/PRO/DX INJ NEW DRUG ADDON: CPT

## 2022-06-09 PROCEDURE — 85027 COMPLETE CBC AUTOMATED: CPT

## 2022-06-09 PROCEDURE — 99239 HOSP IP/OBS DSCHRG MGMT >30: CPT

## 2022-06-09 PROCEDURE — 99285 EMERGENCY DEPT VISIT HI MDM: CPT

## 2022-06-09 PROCEDURE — 97162 PT EVAL MOD COMPLEX 30 MIN: CPT

## 2022-06-09 PROCEDURE — 73564 X-RAY EXAM KNEE 4 OR MORE: CPT

## 2022-06-09 PROCEDURE — 93005 ELECTROCARDIOGRAM TRACING: CPT

## 2022-06-09 PROCEDURE — 87635 SARS-COV-2 COVID-19 AMP PRB: CPT

## 2022-06-09 PROCEDURE — 74176 CT ABD & PELVIS W/O CONTRAST: CPT | Mod: MA

## 2022-06-09 PROCEDURE — 99222 1ST HOSP IP/OBS MODERATE 55: CPT

## 2022-06-09 PROCEDURE — 80053 COMPREHEN METABOLIC PANEL: CPT

## 2022-06-09 PROCEDURE — 96374 THER/PROPH/DIAG INJ IV PUSH: CPT

## 2022-06-09 PROCEDURE — 72125 CT NECK SPINE W/O DYE: CPT | Mod: MA

## 2022-06-09 PROCEDURE — 83036 HEMOGLOBIN GLYCOSYLATED A1C: CPT

## 2022-06-09 PROCEDURE — 85025 COMPLETE CBC W/AUTO DIFF WBC: CPT

## 2022-06-09 PROCEDURE — 71250 CT THORAX DX C-: CPT | Mod: MA

## 2022-06-09 PROCEDURE — 70450 CT HEAD/BRAIN W/O DYE: CPT | Mod: MA

## 2022-06-09 RX ORDER — POLYETHYLENE GLYCOL 3350 17 G/17G
17 POWDER, FOR SOLUTION ORAL
Qty: 0 | Refills: 0 | DISCHARGE
Start: 2022-06-09

## 2022-06-09 RX ORDER — SENNA PLUS 8.6 MG/1
2 TABLET ORAL
Qty: 0 | Refills: 0 | DISCHARGE
Start: 2022-06-09

## 2022-06-09 RX ORDER — LANOLIN ALCOHOL/MO/W.PET/CERES
1 CREAM (GRAM) TOPICAL
Qty: 0 | Refills: 0 | DISCHARGE
Start: 2022-06-09

## 2022-06-09 RX ORDER — LIDOCAINE 4 G/100G
1 CREAM TOPICAL
Qty: 0 | Refills: 0 | DISCHARGE
Start: 2022-06-09

## 2022-06-09 RX ORDER — DOXAZOSIN MESYLATE 4 MG
1 TABLET ORAL
Qty: 0 | Refills: 0 | DISCHARGE

## 2022-06-09 RX ORDER — TRAMADOL HYDROCHLORIDE 50 MG/1
0.5 TABLET ORAL
Qty: 0 | Refills: 0 | DISCHARGE
Start: 2022-06-09

## 2022-06-09 RX ORDER — ARIPIPRAZOLE 15 MG/1
1 TABLET ORAL
Qty: 0 | Refills: 0 | DISCHARGE

## 2022-06-09 RX ORDER — ASPIRIN/CALCIUM CARB/MAGNESIUM 324 MG
1 TABLET ORAL
Qty: 0 | Refills: 0 | DISCHARGE

## 2022-06-09 RX ADMIN — Medication 40 MILLIGRAM(S): at 06:17

## 2022-06-09 RX ADMIN — Medication 81 MILLIGRAM(S): at 11:59

## 2022-06-09 RX ADMIN — Medication 1 MILLIGRAM(S): at 11:59

## 2022-06-09 RX ADMIN — TRAMADOL HYDROCHLORIDE 25 MILLIGRAM(S): 50 TABLET ORAL at 08:58

## 2022-06-09 RX ADMIN — LIDOCAINE 1 PATCH: 4 CREAM TOPICAL at 11:58

## 2022-06-09 RX ADMIN — Medication 0.2 MILLIGRAM(S): at 06:17

## 2022-06-09 RX ADMIN — CARVEDILOL PHOSPHATE 3.12 MILLIGRAM(S): 80 CAPSULE, EXTENDED RELEASE ORAL at 06:17

## 2022-06-09 RX ADMIN — Medication 15 MILLIGRAM(S): at 11:58

## 2022-06-09 RX ADMIN — BUDESONIDE AND FORMOTEROL FUMARATE DIHYDRATE 2 PUFF(S): 160; 4.5 AEROSOL RESPIRATORY (INHALATION) at 08:43

## 2022-06-09 RX ADMIN — ARIPIPRAZOLE 2 MILLIGRAM(S): 15 TABLET ORAL at 12:00

## 2022-06-09 RX ADMIN — TRAMADOL HYDROCHLORIDE 25 MILLIGRAM(S): 50 TABLET ORAL at 08:43

## 2022-06-09 RX ADMIN — Medication 150 MILLIGRAM(S): at 12:00

## 2022-06-09 RX ADMIN — LIDOCAINE 1 PATCH: 4 CREAM TOPICAL at 11:57

## 2022-06-09 RX ADMIN — FAMOTIDINE 20 MILLIGRAM(S): 10 INJECTION INTRAVENOUS at 11:59

## 2022-06-09 RX ADMIN — ISOSORBIDE MONONITRATE 60 MILLIGRAM(S): 60 TABLET, EXTENDED RELEASE ORAL at 11:59

## 2022-06-09 RX ADMIN — AMLODIPINE BESYLATE 10 MILLIGRAM(S): 2.5 TABLET ORAL at 06:17

## 2022-06-09 RX ADMIN — POLYETHYLENE GLYCOL 3350 17 GRAM(S): 17 POWDER, FOR SOLUTION ORAL at 06:17

## 2022-06-09 RX ADMIN — Medication 1: at 08:11

## 2022-06-09 RX ADMIN — OXYCODONE AND ACETAMINOPHEN 1 TABLET(S): 5; 325 TABLET ORAL at 06:21

## 2022-06-09 RX ADMIN — PREGABALIN 1000 MICROGRAM(S): 225 CAPSULE ORAL at 11:58

## 2022-06-09 RX ADMIN — LAMOTRIGINE 100 MILLIGRAM(S): 25 TABLET, ORALLY DISINTEGRATING ORAL at 11:58

## 2022-06-09 RX ADMIN — Medication 50 MILLIGRAM(S): at 06:17

## 2022-06-09 RX ADMIN — Medication 20 MILLIEQUIVALENT(S): at 11:58

## 2022-06-09 NOTE — DISCHARGE NOTE NURSING/CASE MANAGEMENT/SOCIAL WORK - NSDCVIVACCINE_GEN_ALL_CORE_FT
Tdap; 24-Jul-2020 09:31; Valdez Watson (RN); [employer]; [id number] (Exp. Date: 17-Sep-2021); IntraMuscular; Deltoid Left.; 0.5 milliLiter(s); VIS (VIS Published: 09-May-2013, VIS Presented: 24-Jul-2020);

## 2022-06-09 NOTE — DISCHARGE NOTE NURSING/CASE MANAGEMENT/SOCIAL WORK - NSDCPEFALRISK_GEN_ALL_CORE
For information on Fall & Injury Prevention, visit: [email or web address] OR  [email or web address] OR  https://www.cdc.gov/steadi/patient.html

## 2022-06-09 NOTE — DISCHARGE NOTE PROVIDER - NSDCMRMEDTOKEN_GEN_ALL_CORE_FT
aluminum hydroxide-magnesium hydroxide 200 mg-200 mg/5 mL oral suspension: 30 milliliter(s) orally every 4 hours, As needed, Dyspepsia  amLODIPine 10 mg oral tablet: 1 tab(s) orally once a day  ARIPiprazole 2 mg oral tablet: 1 tab(s) orally once a day (in the morning)  aspirin 81 mg oral tablet: 1 tab(s) orally once a day (in the afternoon)  atorvastatin 10 mg oral tablet: 1 tab(s) orally once a day (at bedtime)  budesonide-formoterol 80 mcg-4.5 mcg/inh inhalation aerosol: 2 puff(s) inhaled 2 times a day  carvedilol 3.125 mg oral tablet: 1 tab(s) orally every 12 hours MDD:2  cloNIDine 0.2 mg oral tablet: 1 tab(s) orally 2 times a day  diclofenac 1% topical gel: Apply topically to affected area 3 times a day  doxazosin 4 mg oral tablet: 1 tab(s) orally once a day (in the morning)  famotidine 40 mg oral tablet: 1 tab(s) orally 2 times a day  folic acid 1 mg oral tablet: 1 tab(s) orally once a day  furosemide 40 mg oral tablet: 1 tab(s) orally once a day MDD:1  hydrALAZINE 100 mg oral tablet: 1 tab(s) orally 3 times a day  isosorbide mononitrate 60 mg oral tablet, extended release: 1 tab(s) orally once a day MDD:1 tablet  lamoTRIgine 100 mg oral tablet: 1 tab(s) orally once a day  lidocaine 4% topical film: Apply topically to affected area once a day to boht right and left knees  lidocaine 4% topical film: Apply topically to affected area once a day to both right and left knees  melatonin 3 mg oral tablet: 1 tab(s) orally once a day (at bedtime), As needed, Insomnia  oxybutynin 15 mg/24 hr oral tablet, extended release: 1 tab(s) orally once a day  oxycodone-acetaminophen 5 mg-325 mg oral tablet: 1 tab(s) orally every 6 hours, As needed, Severe Pain (7 - 10)  polyethylene glycol 3350 oral powder for reconstitution: 17 gram(s) orally 2 times a day  potassium chloride 20 mEq oral tablet, extended release: 1 tab(s) orally once a day  please check BMP in 1 week to check Potassium level  senna oral tablet: 2 tab(s) orally once a day (at bedtime)  traMADol 50 mg oral tablet: 0.5 tab(s) orally every 6 hours, As needed, Moderate Pain (4 - 6)  venlafaxine 150 mg oral capsule, extended release: 1 cap(s) orally once a day  Vitamin B-12 1000 mcg oral tablet: 1 tab(s) orally once a day

## 2022-06-09 NOTE — CONSULT NOTE ADULT - SUBJECTIVE AND OBJECTIVE BOX
[facility] - Fifi Bliss, Bonifacio, Kalpesh, Fatimah, Loc Rosario  Office Number: [phone]    Initial Consult Note    CHIEF COMPLAINT: Patient is a 77y old  Male who presents with a chief complaint of Fall, Rehab Placement (09 Jun 2022 07:52)      HPI:  76 yo M PMHx 77M with PMH of Afib (not on eliquis after GIB), MGUS, depression, CAD s/p stents, HLD, HTN, CKD, DM2, diverticulosis, Anemia , Diastolic  CHF (Echo 1/22 LVEF 55%) and BPH with recent GI Bleed 4/22 with multiple pRBC transfusions recently d/[facility] [place], then DCd from [facility] on 4/19 for hematuria, presents to ED from group home c/o bilateral knee pain s/p this morning. Pt was seen here early yesterday AM after mechanical fall 2/2 generalized weakness, states legs gave out and  he fell onto both knees denies head trauma at the time. Pt had basic labs done, slightly dehydrated, was treated with Tylenol and IVF with improvement then was dc'ed home. Pt states he fell again later in the morning in the bathroom- again legs gave out, unsure if he hit his head this time but denies LOC. B/l knee pain is 10/10 with movement, but 5/10 at rest. Roommate helped him up and called EMS. Pt now only c/o bilateral knee pain. Pt denies HA, neck pain, dizziness, chest pain, SOB, back pain, abd pain.    In the ED:  T 97.4F HR 76 /84 RR 16 SpO2 97%  Hgb 9.7 BUN 30 Cr 1.8   CT Head + C-Spine: No acute findings.  CT Chest, AP: Few scattered 2 mm pulmonary nodules are stable. Follow-up chest CT can be obtained in 12 months based on patient risk factors.  Knee Xrays: Small b/l suprapatellar effusions.  ECG: Sinus rhythm with 1st degree AV block. LVH with QRS widening and repolarization abnormality ( R in aVL , Kain product ). Nonspecific T wave abnormality now evident in Anterior leads. T wave inversion less evident in Lateral leads. QT shortened.     No increased chest pain, dyspnea, PND, orthopnea, LE swelling, dizziness, or syncope      PAST MEDICAL & SURGICAL HISTORY:  HTN (hypertension)  c/b multiple episodes of hypertensive urgency      HLD (hyperlipidemia)      Atrial fibrillation  first documented on EKG 10/7/2021      CAD (coronary artery disease)  s/p stents (not on plavix)      Type 2 diabetes mellitus  not on home insulin/ Meds      Anxiety  Depression  multiple psych medications      History of diverticulitis  07/2021      Diverticulosis  c/b GIB in 2020      Afib  on AC      Stage 3 chronic kidney disease      Anemia of chronic disease  Monoclonal Gammopathy-MGUS  pRBC transfusion 10/15/21      Chronic diastolic congestive heart failure      Multiple thyroid nodules      H/O: upper GI bleed  4/22, small Bowel Bleed, 5 u pRBC Transfusion 4/22  S/P Colonoscopy- Diverticulosis  S/P Capsule Endoscopy done [place] [place] -No active bleeding      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  Had surgery ( April 2013 )      S/P tonsillectomy      S/P arthroscopic knee surgery  Bilateral ( 2005 )      Torsion of testicle  Had surgery at age 14      Pilonidal cyst  Had surgery ( 1969 )      S/P cataract surgery  Bilateral      H/O hernia repair          SOCIAL HISTORY:  No tobacco, ethanol, or drug abuse.    FAMILY HISTORY:  FHx: throat cancer    No family history of colorectal cancer      No family history of acute MI or sudden cardiac death.    MEDICATIONS  (STANDING):  amLODIPine   Tablet 10 milliGRAM(s) Oral daily  ARIPiprazole 2 milliGRAM(s) Oral daily  aspirin enteric coated 81 milliGRAM(s) Oral daily  atorvastatin 10 milliGRAM(s) Oral at bedtime  budesonide  80 MICROgram(s)/formoterol 4.5 MICROgram(s) Inhaler 2 Puff(s) Inhalation two times a day  carvedilol 3.125 milliGRAM(s) Oral every 12 hours  cloNIDine 0.2 milliGRAM(s) Oral two times a day  cyanocobalamin 1000 MICROGram(s) Oral daily  dextrose 5%. 1000 milliLiter(s) (50 mL/Hr) IV Continuous <Continuous>  dextrose 50% Injectable 25 Gram(s) IV Push once  doxazosin 4 milliGRAM(s) Oral at bedtime  famotidine    Tablet 20 milliGRAM(s) Oral daily  folic acid 1 milliGRAM(s) Oral daily  furosemide    Tablet 40 milliGRAM(s) Oral daily  glucagon  Injectable 1 milliGRAM(s) IntraMuscular once  hydrALAZINE 50 milliGRAM(s) Oral three times a day  insulin lispro (ADMELOG) corrective regimen sliding scale   SubCutaneous three times a day before meals  insulin lispro (ADMELOG) corrective regimen sliding scale   SubCutaneous at bedtime  isosorbide   mononitrate ER Tablet (IMDUR) 60 milliGRAM(s) Oral daily  lamoTRIgine 100 milliGRAM(s) Oral daily  lidocaine   4% Patch 1 Patch Transdermal daily  lidocaine   4% Patch 1 Patch Transdermal daily  oxybutynin XL 15 milliGRAM(s) Oral daily  polyethylene glycol 3350 17 Gram(s) Oral two times a day  potassium chloride    Tablet ER 20 milliEquivalent(s) Oral daily  senna 2 Tablet(s) Oral at bedtime  venlafaxine XR. 150 milliGRAM(s) Oral daily    MEDICATIONS  (PRN):  acetaminophen     Tablet .. 650 milliGRAM(s) Oral every 6 hours PRN Temp greater or equal to 38C (100.4F), Mild Pain (1 - 3)  aluminum hydroxide/magnesium hydroxide/simethicone Suspension 30 milliLiter(s) Oral every 4 hours PRN Dyspepsia  dextrose Oral Gel 15 Gram(s) Oral once PRN Blood Glucose LESS THAN 70 milliGRAM(s)/deciliter  melatonin 3 milliGRAM(s) Oral at bedtime PRN Insomnia  ondansetron Injectable 4 milliGRAM(s) IV Push every 8 hours PRN Nausea and/or Vomiting  oxycodone    5 mG/acetaminophen 325 mG 1 Tablet(s) Oral every 6 hours PRN Severe Pain (7 - 10)  traMADol 25 milliGRAM(s) Oral every 6 hours PRN Moderate Pain (4 - 6)      Allergies    No Known Allergies    Intolerances        REVIEW OF SYSTEMS:     All other review of systems is negative unless indicated above    VITAL SIGNS:   Vital Signs Last 24 Hrs  T(C): 36.7 (09 Jun 2022 11:23), Max: 36.7 (09 Jun 2022 11:23)  T(F): 98.1 (09 Jun 2022 11:23), Max: 98.1 (09 Jun 2022 11:23)  HR: 73 (09 Jun 2022 11:23) (73 - 89)  BP: 169/91 (09 Jun 2022 11:23) (169/91 - 186/91)  BP(mean): --  RR: 17 (09 Jun 2022 11:23) (16 - 19)  SpO2: 97% (09 Jun 2022 11:23) (95% - 97%)    I&O's Summary      On Exam:    Constitutional: NAD, alert and oriented x 3  Lungs:  Non-labored, breath sounds are clear bilaterally, No wheezing, rales or rhonchi  Cardiovascular: RRR.  S1 and S2 positive.  No murmurs, rubs, gallops or clicks  Gastrointestinal: Bowel Sounds present, soft, nontender.   Lymph: minimal peripheral edema. No cervical lymphadenopathy.  Neurological: Alert, no focal deficits  Skin: No rashes or ulcers        LABS: All Labs Reviewed:                        9.6    7.77  )-----------( 232      ( 09 Jun 2022 06:55 )             29.9                         9.7    10.12 )-----------( 259      ( 07 Jun 2022 13:28 )             31.1                         10.3   7.99  )-----------( 249      ( 07 Jun 2022 00:32 )             33.5     09 Jun 2022 06:55    139    |  106    |  27     ----------------------------<  141    3.3     |  24     |  1.70   07 Jun 2022 13:28    142    |  112    |  30     ----------------------------<  117    3.8     |  24     |  1.80   07 Jun 2022 00:32    143    |  110    |  37     ----------------------------<  137    3.5     |  25     |  2.10     Ca    9.3        09 Jun 2022 06:55  Ca    9.0        07 Jun 2022 13:28  Ca    8.9        07 Jun 2022 00:32    TPro  6.6    /  Alb  3.1    /  TBili  0.4    /  DBili  x      /  AST  17     /  ALT  17     /  AlkPhos  62     09 Jun 2022 06:55  TPro  6.9    /  Alb  3.4    /  TBili  0.3    /  DBili  x      /  AST  20     /  ALT  20     /  AlkPhos  67     07 Jun 2022 13:28  TPro  7.1    /  Alb  3.5    /  TBili  0.2    /  DBili  x      /  AST  20     /  ALT  22     /  AlkPhos  77     07 Jun 2022 00:32          Blood Culture:         RADIOLOGY:    EKG:

## 2022-06-09 NOTE — CONSULT NOTE ADULT - ASSESSMENT
76 yo M PMHx 77M with PMH of Afib (not on eliquis after GIB), MGUS, depression, CAD s/p stents, HLD, HTN, CKD, DM2, diverticulosis, Anemia , Diastolic  CHF (Echo 1/22 LVEF 55%) and BPH with recent GI Bleed 4/22 with multiple pRBC transfusions recently d/chio from [facility] 4/11/22, then DCd from [facility] on 4/19 for hematuria, presents to ED from group home c/o bilateral knee pain s/p this morning.    - No evidence of active ischemia or vol ol  - Continue aspirin  - Continue amlodipine 10 QD  - Continue statin drug  - Continue coreg 3.125 ibd  - Continue clonidine 0.2 bid  - Continue hydralazine 50 tid  - Continue Imdur 60 QD  - Continue Lasix 40 QD  - Outpatient follow up with me in the office

## 2022-06-09 NOTE — DISCHARGE NOTE PROVIDER - NSDCCPCAREPLAN_GEN_ALL_CORE_FT
PRINCIPAL DISCHARGE DIAGNOSIS  Diagnosis: Frequent falls  Assessment and Plan of Treatment: Evaluateed by PT, recommended for Rehab, imaging negative for any acute fractures or disclocations

## 2022-06-09 NOTE — DISCHARGE NOTE NURSING/CASE MANAGEMENT/SOCIAL WORK - PATIENT PORTAL LINK FT
You can access the FollowMyHealth Patient Portal offered by [facility] by registering at the following website: [email or web address]. By joining [facility] FollowMyHealth portal, you will also be able to view your health information using other applications (apps) compatible with our system.

## 2022-06-09 NOTE — DISCHARGE NOTE PROVIDER - HOSPITAL COURSE
HPI:  78 yo M PMHx 77M with PMH of Afib (not on eliquis after GIB), MGUS, depression, CAD s/p stents, HLD, HTN, CKD, DM2, diverticulosis, Anemia , Diastolic  CHF (Echo 1/22 LVEF 55%) and BPH with recent GI Bleed 4/22 with multiple pRBC transfusions recently d/chio from [facility] 4/11/22, then DCd from [facility] on 4/19 for hematuria, presents to ED from group home c/o bilateral knee pain s/p this morning. Pt was seen here early yesterday AM after mechanical fall 2/2 generalized weakness, states legs gave out and  he fell onto both knees denies head trauma at the time. Pt had basic labs done, slightly dehydrated, was treated with Tylenol and IVF with improvement then was dc'ed home. Pt states he fell again later in the morning in the bathroom- again legs gave out, unsure if he hit his head this time but denies LOC. B/l knee pain is 10/10 with movement, but 5/10 at rest. Roommate helped him up and called EMS. Pt now only c/o bilateral knee pain. Pt denies HA, neck pain, dizziness, chest pain, SOB, back pain, abd pain.    In the ED:  T 97.4F HR 76 /84 RR 16 SpO2 97%  Hgb 9.7 BUN 30 Cr 1.8   CT Head + C-Spine: No acute findings.  CT Chest, AP: Few scattered 2 mm pulmonary nodules are stable. Follow-up chest CT can be obtained in 12 months based on patient risk factors.  Knee Xrays: Small b/l suprapatellar effusions.  ECG: Sinus rhythm with 1st degree AV block. LVH with QRS widening and repolarization abnormality ( R in aVL , Schroeder product ). Nonspecific T wave abnormality now evident in Anterior leads. T wave inversion less evident in Lateral leads. QT shortened. (08 Jun 2022 18:29)    HOSPITAL COURSE:    Patient presented with weakness, inability to ambulate, multiple falls, and knee pain.  xrays of knees and CT head, neck, pelvis did not show any fracture or dislocation.  PT evaluated patient and recommedned Rehab.  Social work coordinated transfer to rehab.    VITAL SIGNS:    Vital Signs Last 24 Hrs  T(C): 36.6 (09 Jun 2022 06:06), Max: 36.6 (09 Jun 2022 06:06)  T(F): 97.9 (09 Jun 2022 06:06), Max: 97.9 (09 Jun 2022 06:06)  HR: 89 (09 Jun 2022 06:06) (68 - 89)  BP: 186/91 (09 Jun 2022 06:06) (181/84 - 192/98)  BP(mean): --  RR: 16 (09 Jun 2022 06:06) (16 - 19)  SpO2: 95% (09 Jun 2022 06:06) (95% - 98%)    PHYSICAL EXAM:     GENERAL: no acute distress  HEENT: NC/AT, EOMI, neck supple, MMM  RESPIRATORY: LCTAB/L, no rhonchi, rales, or wheezing  CARDIOVASCULAR: RRR, no murmurs, gallops, rubs  ABDOMINAL: soft, non-tender, non-distended, positive bowel sounds   EXTREMITIES: no clubbing, cyanosis, or edema  NEUROLOGICAL: alert and oriented x 3, non-focal  SKIN: no rashes or lesions   MUSCULOSKELETAL: no gross joint deformity                          9.6    7.77  )-----------( 232      ( 09 Jun 2022 06:55 )             29.9     06-09    139  |  106  |  27<H>  ----------------------------<  141<H>  3.3<L>   |  24  |  1.70<H>    Ca    9.3      09 Jun 2022 06:55    TPro  6.6  /  Alb  3.1<L>  /  TBili  0.4  /  DBili  x   /  AST  17  /  ALT  17  /  AlkPhos  62  06-09      CAPILLARY BLOOD GLUCOSE      POCT Blood Glucose.: 161 mg/dL (09 Jun 2022 07:51)  POCT Blood Glucose.: 119 mg/dL (08 Jun 2022 22:52)

## 2022-07-22 NOTE — H&P ADULT - NEGATIVE CARDIOVASCULAR SYMPTOMS
Patient is a 52yo female with a pmh of drug abuse, tobacco use, psychiatric disorder, bipolar disorder, Hepatitis C, HTN, opioid overdose, seizure disorder. She presented with SOB x 2 days, worse with exertion. Also reports fatigue, orthopnea, and cough. Denies chest pain. No fevers. In the ED, she was found to have pulmonary edema and cardiomegaly [place] chest [place] [address]. Troponin 0.079. Chest CTA with no PE but with LLL infiltrate. She was given IV fluids, zosyn, lasix, ASA, and NGT paste. Urine was not measured due to patient urinating on floor.    No current CP/no palpitations

## 2022-08-15 NOTE — PROVIDER CONTACT NOTE (OTHER) - ASSESSMENT
pt agreed to monitor. per serafin in tele pt in afib with a rate of 57. pt resting comfortably in bed at this time. Pt denies chest pain and no sob noted.
pt awake sitting up in bed at this time. pt states " Campbell anxious can I take something to calm me down I need to get out of here" no distress noted at this time and pt denies any pain.
pt complains of "palpitations" and appears anxious. pt states" im refusing to wear the monitor".
Pt continuously wanting more to eat and drink and get OOB to chair throughout the night. Pt w/ unsteady gait, SOB, on NC and edema to b/l LE.  H/O CHF on lasix.
pt. stable, no s/s of distress
Pt AOx2, denies pain at this time
pt. stable
Pt A&O, VSS. Denies nausea.
pt awake in bed at this time. pt states he thinks he is having palpitations .VSS. pt states " I might be anxious again". pt appears comfortable at this time. no distress noted.
fall precautions

## 2022-08-23 NOTE — PROGRESS NOTE ADULT - PROBLEM SELECTOR PLAN 12
Patient Refused
SCDs in context of GI/rectal bleed

## 2022-08-28 NOTE — PATIENT PROFILE ADULT - ...
Labor Progress Note - Glen Goodwin 33 y o  female MRN: [id number]    Unit/Bed#:  204-01 Encounter: [id number]       Contraction Frequency (minutes): 3-4  Contraction Quality: Moderate  Tachysystole: No   Cervical Dilation: 6        Cervical Effacement: 90  Fetal Station: -2  Baseline Rate: 145 bpm  Fetal Heart Rate: 150 BPM  FHR Category: Category II               Vital Signs:   Vitals:    08/28/22 0915   BP: 117/55   Pulse: (!) 106   Resp:    Temp:    SpO2:        Notes/comments:    Notified that patient SROM clear  SVE as above, unchanged   She is comfortable with epidural      sAter Manley MD 8/28/2022 10:18 AM 19-Oct-2021 01:41:54

## 2022-09-27 ENCOUNTER — EMERGENCY (EMERGENCY)
Facility: HOSPITAL | Age: 77
LOS: 1 days | Discharge: HOME | End: 2022-09-27
Attending: STUDENT IN AN ORGANIZED HEALTH CARE EDUCATION/TRAINING PROGRAM
Payer: MEDICARE

## 2022-09-27 VITALS
WEIGHT: 175.05 LBS | HEART RATE: 64 BPM | SYSTOLIC BLOOD PRESSURE: 146 MMHG | TEMPERATURE: 98 F | OXYGEN SATURATION: 97 % | DIASTOLIC BLOOD PRESSURE: 77 MMHG | RESPIRATION RATE: 16 BRPM | HEIGHT: 68 IN

## 2022-09-27 DIAGNOSIS — F32.A DEPRESSION, UNSPECIFIED: ICD-10-CM

## 2022-09-27 DIAGNOSIS — N44.00 TORSION OF TESTIS, UNSPECIFIED: Chronic | ICD-10-CM

## 2022-09-27 DIAGNOSIS — Z98.89 OTHER SPECIFIED POSTPROCEDURAL STATES: Chronic | ICD-10-CM

## 2022-09-27 DIAGNOSIS — I66.9 OCCLUSION AND STENOSIS OF UNSPECIFIED CEREBRAL ARTERY: Chronic | ICD-10-CM

## 2022-09-27 DIAGNOSIS — Z98.49 CATARACT EXTRACTION STATUS, UNSPECIFIED EYE: Chronic | ICD-10-CM

## 2022-09-27 DIAGNOSIS — L05.91 PILONIDAL CYST WITHOUT ABSCESS: Chronic | ICD-10-CM

## 2022-09-27 DIAGNOSIS — R41.3 OTHER AMNESIA: ICD-10-CM

## 2022-09-27 LAB
ALBUMIN SERPL ELPH-MCNC: 4.1 G/DL — SIGNIFICANT CHANGE UP (ref 3.3–5.2)
ALP SERPL-CCNC: 81 U/L — SIGNIFICANT CHANGE UP (ref 40–120)
ALT FLD-CCNC: 23 U/L — SIGNIFICANT CHANGE UP
ANION GAP SERPL CALC-SCNC: 15 MMOL/L — SIGNIFICANT CHANGE UP (ref 5–17)
APAP SERPL-MCNC: <3 UG/ML — LOW (ref 10–26)
APPEARANCE UR: CLEAR — SIGNIFICANT CHANGE UP
AST SERPL-CCNC: 21 U/L — SIGNIFICANT CHANGE UP
BACTERIA # UR AUTO: ABNORMAL
BASOPHILS # BLD AUTO: 0.03 K/UL — SIGNIFICANT CHANGE UP (ref 0–0.2)
BASOPHILS NFR BLD AUTO: 0.4 % — SIGNIFICANT CHANGE UP (ref 0–2)
BILIRUB SERPL-MCNC: 0.2 MG/DL — LOW (ref 0.4–2)
BILIRUB UR-MCNC: NEGATIVE — SIGNIFICANT CHANGE UP
BUN SERPL-MCNC: 45.4 MG/DL — HIGH (ref 8–20)
CALCIUM SERPL-MCNC: 9.5 MG/DL — SIGNIFICANT CHANGE UP (ref 8.4–10.5)
CHLORIDE SERPL-SCNC: 99 MMOL/L — SIGNIFICANT CHANGE UP (ref 98–107)
CO2 SERPL-SCNC: 22 MMOL/L — SIGNIFICANT CHANGE UP (ref 22–29)
COLOR SPEC: YELLOW — SIGNIFICANT CHANGE UP
CREAT SERPL-MCNC: 1.77 MG/DL — HIGH (ref 0.5–1.3)
DIFF PNL FLD: NEGATIVE — SIGNIFICANT CHANGE UP
EGFR: 39 ML/MIN/1.73M2 — LOW
EOSINOPHIL # BLD AUTO: 0.24 K/UL — SIGNIFICANT CHANGE UP (ref 0–0.5)
EOSINOPHIL NFR BLD AUTO: 3.1 % — SIGNIFICANT CHANGE UP (ref 0–6)
EPI CELLS # UR: SIGNIFICANT CHANGE UP
ETHANOL SERPL-MCNC: <10 MG/DL — SIGNIFICANT CHANGE UP (ref 0–9)
GLUCOSE SERPL-MCNC: 92 MG/DL — SIGNIFICANT CHANGE UP (ref 70–99)
GLUCOSE UR QL: NEGATIVE MG/DL — SIGNIFICANT CHANGE UP
HCT VFR BLD CALC: 34.2 % — LOW (ref 39–50)
HGB BLD-MCNC: 10.8 G/DL — LOW (ref 13–17)
IMM GRANULOCYTES NFR BLD AUTO: 0.3 % — SIGNIFICANT CHANGE UP (ref 0–0.9)
INR BLD: 1.1 RATIO — SIGNIFICANT CHANGE UP (ref 0.88–1.16)
KETONES UR-MCNC: ABNORMAL
LEUKOCYTE ESTERASE UR-ACNC: NEGATIVE — SIGNIFICANT CHANGE UP
LYMPHOCYTES # BLD AUTO: 2.94 K/UL — SIGNIFICANT CHANGE UP (ref 1–3.3)
LYMPHOCYTES # BLD AUTO: 38.5 % — SIGNIFICANT CHANGE UP (ref 13–44)
MCHC RBC-ENTMCNC: 27.3 PG — SIGNIFICANT CHANGE UP (ref 27–34)
MCHC RBC-ENTMCNC: 31.6 GM/DL — LOW (ref 32–36)
MCV RBC AUTO: 86.6 FL — SIGNIFICANT CHANGE UP (ref 80–100)
MONOCYTES # BLD AUTO: 0.76 K/UL — SIGNIFICANT CHANGE UP (ref 0–0.9)
MONOCYTES NFR BLD AUTO: 9.9 % — SIGNIFICANT CHANGE UP (ref 2–14)
NEUTROPHILS # BLD AUTO: 3.65 K/UL — SIGNIFICANT CHANGE UP (ref 1.8–7.4)
NEUTROPHILS NFR BLD AUTO: 47.8 % — SIGNIFICANT CHANGE UP (ref 43–77)
NITRITE UR-MCNC: NEGATIVE — SIGNIFICANT CHANGE UP
PH UR: 6 — SIGNIFICANT CHANGE UP (ref 5–8)
PLATELET # BLD AUTO: 215 K/UL — SIGNIFICANT CHANGE UP (ref 150–400)
POTASSIUM SERPL-MCNC: 4.3 MMOL/L — SIGNIFICANT CHANGE UP (ref 3.5–5.3)
POTASSIUM SERPL-SCNC: 4.3 MMOL/L — SIGNIFICANT CHANGE UP (ref 3.5–5.3)
PROT SERPL-MCNC: 7.2 G/DL — SIGNIFICANT CHANGE UP (ref 6.6–8.7)
PROT UR-MCNC: 500 MG/DL
PROTHROM AB SERPL-ACNC: 12.8 SEC — SIGNIFICANT CHANGE UP (ref 10.5–13.4)
RBC # BLD: 3.95 M/UL — LOW (ref 4.2–5.8)
RBC # FLD: 17.2 % — HIGH (ref 10.3–14.5)
RBC CASTS # UR COMP ASSIST: SIGNIFICANT CHANGE UP /HPF (ref 0–4)
SALICYLATES SERPL-MCNC: <0.6 MG/DL — LOW (ref 10–20)
SODIUM SERPL-SCNC: 136 MMOL/L — SIGNIFICANT CHANGE UP (ref 135–145)
SP GR SPEC: 1.01 — SIGNIFICANT CHANGE UP (ref 1.01–1.02)
UROBILINOGEN FLD QL: NEGATIVE MG/DL — SIGNIFICANT CHANGE UP
WBC # BLD: 7.64 K/UL — SIGNIFICANT CHANGE UP (ref 3.8–10.5)
WBC # FLD AUTO: 7.64 K/UL — SIGNIFICANT CHANGE UP (ref 3.8–10.5)
WBC UR QL: SIGNIFICANT CHANGE UP /HPF (ref 0–5)

## 2022-09-27 PROCEDURE — 90792 PSYCH DIAG EVAL W/MED SRVCS: CPT

## 2022-09-27 PROCEDURE — 99284 EMERGENCY DEPT VISIT MOD MDM: CPT | Mod: GC

## 2022-09-27 RX ORDER — ARIPIPRAZOLE 15 MG/1
2 TABLET ORAL DAILY
Refills: 0 | Status: DISCONTINUED | OUTPATIENT
Start: 2022-09-27 | End: 2022-10-02

## 2022-09-27 RX ORDER — LAMOTRIGINE 25 MG/1
100 TABLET, ORALLY DISINTEGRATING ORAL AT BEDTIME
Refills: 0 | Status: DISCONTINUED | OUTPATIENT
Start: 2022-09-27 | End: 2022-10-02

## 2022-09-27 RX ORDER — FOLIC ACID 0.8 MG
1 TABLET ORAL DAILY
Refills: 0 | Status: DISCONTINUED | OUTPATIENT
Start: 2022-09-27 | End: 2022-10-02

## 2022-09-27 RX ORDER — ISOSORBIDE MONONITRATE 60 MG/1
60 TABLET, EXTENDED RELEASE ORAL DAILY
Refills: 0 | Status: DISCONTINUED | OUTPATIENT
Start: 2022-09-27 | End: 2022-10-02

## 2022-09-27 RX ORDER — ATORVASTATIN CALCIUM 80 MG/1
10 TABLET, FILM COATED ORAL AT BEDTIME
Refills: 0 | Status: DISCONTINUED | OUTPATIENT
Start: 2022-09-27 | End: 2022-10-02

## 2022-09-27 RX ORDER — AMLODIPINE BESYLATE 2.5 MG/1
10 TABLET ORAL DAILY
Refills: 0 | Status: DISCONTINUED | OUTPATIENT
Start: 2022-09-27 | End: 2022-10-02

## 2022-09-27 RX ORDER — DOXAZOSIN MESYLATE 4 MG
4 TABLET ORAL AT BEDTIME
Refills: 0 | Status: DISCONTINUED | OUTPATIENT
Start: 2022-09-27 | End: 2022-10-02

## 2022-09-27 RX ORDER — OXYCODONE AND ACETAMINOPHEN 5; 325 MG/1; MG/1
1 TABLET ORAL ONCE
Refills: 0 | Status: DISCONTINUED | OUTPATIENT
Start: 2022-09-27 | End: 2022-09-27

## 2022-09-27 RX ORDER — OXYBUTYNIN CHLORIDE 5 MG
15 TABLET ORAL DAILY
Refills: 0 | Status: DISCONTINUED | OUTPATIENT
Start: 2022-09-27 | End: 2022-10-02

## 2022-09-27 RX ORDER — GABAPENTIN 400 MG/1
600 CAPSULE ORAL DAILY
Refills: 0 | Status: DISCONTINUED | OUTPATIENT
Start: 2022-09-27 | End: 2022-10-02

## 2022-09-27 RX ORDER — LIDOCAINE 4 G/100G
1 CREAM TOPICAL ONCE
Refills: 0 | Status: COMPLETED | OUTPATIENT
Start: 2022-09-27 | End: 2022-09-27

## 2022-09-27 RX ORDER — CARVEDILOL PHOSPHATE 80 MG/1
6.25 CAPSULE, EXTENDED RELEASE ORAL EVERY 12 HOURS
Refills: 0 | Status: DISCONTINUED | OUTPATIENT
Start: 2022-09-27 | End: 2022-10-02

## 2022-09-27 RX ORDER — POTASSIUM CHLORIDE 20 MEQ
20 PACKET (EA) ORAL DAILY
Refills: 0 | Status: DISCONTINUED | OUTPATIENT
Start: 2022-09-27 | End: 2022-10-02

## 2022-09-27 RX ORDER — VENLAFAXINE HCL 75 MG
150 CAPSULE, EXT RELEASE 24 HR ORAL DAILY
Refills: 0 | Status: DISCONTINUED | OUTPATIENT
Start: 2022-09-27 | End: 2022-10-02

## 2022-09-27 RX ORDER — FUROSEMIDE 40 MG
40 TABLET ORAL DAILY
Refills: 0 | Status: DISCONTINUED | OUTPATIENT
Start: 2022-09-27 | End: 2022-10-02

## 2022-09-27 RX ORDER — HYDRALAZINE HCL 50 MG
100 TABLET ORAL EVERY 8 HOURS
Refills: 0 | Status: DISCONTINUED | OUTPATIENT
Start: 2022-09-27 | End: 2022-10-02

## 2022-09-27 RX ADMIN — LIDOCAINE 1 PATCH: 4 CREAM TOPICAL at 19:22

## 2022-09-27 RX ADMIN — OXYCODONE AND ACETAMINOPHEN 1 TABLET(S): 5; 325 TABLET ORAL at 19:22

## 2022-09-27 NOTE — ED PROVIDER NOTE - PROGRESS NOTE DETAILS
Lazaro: Notified by hospitalist Dr. Fabian, who works at pt's facility, that pt was only sent to the ED for psych eval after endorsing SI/HI. Pt with FROM of b/l lower extremities; will D/C CT scan. 1:1 ordered. BH made aware. Willis: [facility] Will [facility] for [facility] and reassessment. Lazaro: Collateral obtained from [facility] staff; pt was moved to new floor today, became upset and made suicidal/homicidal statements. Spoke with Dr. Fabian; pt ok to return to facility if cleared by . JK - labs WNL.  consulted, patient cleared from psychiatrist perspective. Ready and agreeable to DC back to [place]. Currently stable, no SI/HI.

## 2022-09-27 NOTE — ED PROVIDER NOTE - PATIENT PORTAL LINK FT
You can access the FollowMyHealth Patient Portal offered by [facility] by registering at the following website: [email or web address]. By joining [facility] FollowMyHealth portal, you will also be able to view your health information using other applications (apps) compatible with our system.

## 2022-09-27 NOTE — ED BEHAVIORAL HEALTH ASSESSMENT NOTE - DETAILS
None ED attending aware Ed attending aware No contacts with children HTN; CAD S/P Stents; HLD Discussed with Patient and his sister

## 2022-09-27 NOTE — ED PROVIDER NOTE - ATTENDING CONTRIBUTION TO CARE
I have personally performed a face to face medical and diagnostic evaluation of the patient. I have discussed with and reviewed the resident's note and agree with the History, ROS, Physical Exam and MDM unless otherwise indicated. A brief summary of my personal evaluation and impression can be found below.    78yo man was brought in from nursing facility with bilateral LE pain radiating from left back. No fevers or chills, no bowel/bladder incontinence but wears a depends, no saddle anesthesia, and no numbness/weakness to extremities. No dysuria, no hematuria. No history of IVDU. No trauma. No rash.  Pt states he normally ambulates with a walker. Denies any leg swelling. Denies chest pain or SOB.    On exam, initial vitals were reviewed.  Pt is well-appearing in no acute distress. NC/AT, clear eyes, MMM, supple neck, RRR, no LE edema or tenderness to palpation, clear lungs bilaterally, abdomen soft, nontender, nondistended, no obvious joint deformities, no midlines spinal tenderness but tender to palpation of left lateral lower back, pt is awake and alert and moving all extremities without difficulty with 5/5 strength in bilateral LE, no rash noted.     Low suspicion for spinal cord involvement. CT spine, blood work, pain medication will reassess and ambulate. Pt signed out to Dr. Willis.

## 2022-09-27 NOTE — ED PROVIDER NOTE - NSICDXPASTMEDICALHX_GEN_ALL_CORE_FT
PAST MEDICAL HISTORY:  Afib on AC    Anemia of chronic disease Monoclonal Gammopathy-MGUS  pRBC transfusion 10/15/21    Anxiety Depression  multiple psych medications    Atrial fibrillation first documented on EKG 10/7/2021    CAD (coronary artery disease) s/p stents (not on plavix)    Chronic diastolic congestive heart failure     Diverticulosis c/b GIB in 2020    H/O: upper GI bleed 4/22, small Bowel Bleed, 5 u pRBC Transfusion 4/22  S/P Colonoscopy- Diverticulosis  S/P Capsule Endoscopy done -[facility] -No active bleeding    History of diverticulitis 07/2021    HLD (hyperlipidemia)     HTN (hypertension) c/b multiple episodes of hypertensive urgency    Multiple thyroid nodules     Stage 3 chronic kidney disease     Type 2 diabetes mellitus not on home insulin/ Meds

## 2022-09-27 NOTE — ED ADULT TRIAGE NOTE - WEIGHT IN LBS
Reason for Consultation: Dyspnea    Assessment/Plan:     1. Dyspnea  - suspect both pulm + HFpEF  - abnormal CT chest with groundglass appearance  2. HFpEF  3. CKD  4. History of CVA  - Right CEA 2019    PLAN:  - diurese  - Abx    S: Little clinical change      HPI:     Angie Guadarrama is a 75 year old female who is referred by Dr. Jolly for evaluation and management of dyspnea. She has a history of mitral valve repair plus MAZE (1/21/2016-[place]) for paroxysmal atrial fibrillation with subsequent control. She had mild to moderate CAD at that time. She underwent right carotid endarterectomy in 2019. She has dyspnea that is multifactorial with a component of HFpEF, atrial fibrillation but also with groundglass abnormalities on CT of the chest done. I am going to refer her to pulmonary and otherwise see her back in 3 months.     7/8/2021: She was admitted to [facility] in June 2021 with shortness of breath. She is found to be in atrial fibrillation. Echo showed normal LV function. CT of the chest did show pulmonary abnormalities including groundglass densities bilaterally. Lasix was added and she is somewhat better but not back to her baseline.    2/24/2020: She denies chest pain, shortness of breath, palpitations, syncope or presyncope. She is not having any problems with his present medical regimen.    9/11/2018: Angie Guadarrama is a 75 year old female who is referred by Dr. Jung for evaluation and management of CAD and carotid disease. She has a history of atrial fibrillation and coronary disease. In 2016 she underwent mitral valve repair + MAZE ([place]). Most recently she developed a right hemispheric stroke while visiting in [place]. This was treated with TPA with significant recovery. She does have a residual significant right internal carotid artery lesion. She denies chest pain, shortness of breath, palpitations, syncope or presyncope.      Review of Systems:  12 point ROS is negative  except as in HPI    Past Medical History:   Diagnosis Date   • Atrial fibrillation (CMS/[facility])    • Cerebral infarction (CMS/[facility])    • CHF exacerbation (CMS/[facility]) 6/8/2021   • Chronic pain    • CKD (chronic kidney disease) stage 3, GFR 30-59 ml/min (CMS/[facility]) 3/19/2019   • Closed compression fracture of body of L1 vertebra (CMS/[facility]) 7/6/2020   • COPD (chronic obstructive pulmonary disease) (CMS/[facility]) 7/6/2020   • Essential (primary) hypertension    • H/O mitral valve repair    • HLD (hyperlipidemia) 5/1/2018   • MRSA (methicillin resistant Staphylococcus aureus) 7/6/2020       History reviewed. No pertinent family history.   Past Surgical History:   Procedure Laterality Date   • Cardiac surgery       Family History of Premature CAD: No   Social History:  Social History     Socioeconomic History   • Marital status:      Spouse name: Not on file   • Number of children: Not on file   • Years of education: Not on file   • Highest education level: Not on file   Occupational History   • Not on file   Tobacco Use   • Smoking status: Never Smoker   • Smokeless tobacco: Never Used   Vaping Use   • Vaping Use: never used   Substance and Sexual Activity   • Alcohol use: Not Currently   • Drug use: Never   • Sexual activity: Not on file   Other Topics Concern   • Not on file   Social History Narrative   • Not on file     Social Determinants of Health     Financial Resource Strain:    • Social Determinants: Financial Resource Strain: Not on file   Food Insecurity:    • Social Determinants: Food Insecurity: Not on file   Transportation Needs:    • Lack of Transportation (Medical): Not on file   • Lack of Transportation (Non-Medical): Not on file   Physical Activity:    • Days of Exercise per Week: Not on file   • Minutes of Exercise per Session: Not on file   Stress:    • Social Determinants: Stress: Not on file   Social Connections:    • Social Determinants: Social Connections: Not on file   Intimate Partner Violence: Not At  Risk   • Social Determinants: Intimate Partner Violence Past Fear: No   • Social Determinants: Intimate Partner Violence Current Fear: No        Medications:  Current Facility-Administered Medications   Medication   • hydrALAZINE (APRESOLINE) injection 10 mg   • dilTIAZem (TIAZAC,CARDIZEM CD) 24 hr capsule 240 mg   • furosemide (LASIX INJECT) injection 80 mg   • ipratropium-albuterol (DUONEB) 0.5-2.5 (3) MG/3ML nebulizer solution 3 mL   • apixaBAN (ELIQUIS) tablet 5 mg   • atorvastatin (LIPITOR) tablet 20 mg   • gabapentin (NEURONTIN) capsule 300 mg   • LORazepam (ATIVAN) tablet 0.5 mg   • sodium chloride 0.9 % flush bag 25 mL   • sodium chloride (PF) 0.9 % injection 2 mL   • Potassium Standard Replacement Protocol   • Magnesium Standard Replacement Protocol   • ondansetron (ZOFRAN) injection 4 mg   • acetaminophen (TYLENOL) tablet 650 mg   • docusate sodium-sennosides (SENOKOT S) 50-8.6 MG 2 tablet   • aluminum-magnesium hydroxide-simethicone (MAALOX) 200-200-20 MG/5ML suspension 30 mL   • sodium chloride 0.9 % flush bag 25 mL   • guaifenesin 100 MG/5ML solution 200 mg   • QUEtiapine (SEROquel) tablet 50 mg   • escitalopram (LEXAPRO) tablet 20 mg   • [START ON 10/20/2021] vitamin D3 (CHOLECALCIFEROL) 1.25 MG (91081 UT) capsule 1.25 mg          Allergies:  ALLERGIES:  Latex and Adhesive   (environmental)      EXAM:     Vitals:    10/17/21 0908   BP: (!) 143/67   Pulse: 68   Resp: 16   Temp: 99.3 °F (37.4 °C)         Intake/Output Summary (Last 24 hours) at 10/17/2021 1141  Last data filed at 10/17/2021 0258  Gross per 24 hour   Intake 850 ml   Output 2125 ml   Net -1275 ml     Weight    10/16/21 1000 10/17/21 0700   Weight: 100.1 kg (220 lb 10.9 oz) 92.6 kg (204 lb 2.3 oz)         Hemodynamic parameters (last 24 hours):      PPE not available. Deferred    [CONS: well developed, well nourished.    HEAD/FACE:no trauma, normocephalic. EYES:conjunctiva not injected, no xanthelasma.   ENT:mucosa pink and moist.  NECK:jugular venous pressure not elevated. RESP:normal coarse BS  GI:soft, non-tender;rectal deferred.   MS:adequate gait for exercise testing.   EXT:no clubbing or cyanosis.   SKIN:no rashes,lesions. NEURO/PSYCH:alert and oriented. Normal affect.    CV:   PALP:PMI not displaced; no lifts, thrills or rubs.   AUSC: Regular rhythm; normal S1, S2 without S3; 1/6 systolic murmur.   CAROTIDS:carotid pulses normal.   ABD AORTA:not enlarged.   FEM:femoral pulses intact.   PEDAL:pedal pulses intact.  EXT:no peripheral edema.[      LABORATORY DATA:     Labs reviewed:  CMP  Recent Labs     10/16/21  0957 10/17/21  0507   SODIUM 138 139   POTASSIUM 3.8 3.9   CO2 30 30   ANIONGAP 5* 8*   GLUCOSE 111* 93   BUN 15 18   CREATININE 0.95 1.03*   BCRAT 16 17   CALCIUM 9.1 9.1   BILIRUBIN 0.6  --    AST 15  --    GPT 16  --    ALKPT 77  --    GLOB 3.9  --    AGR 0.9*  --         CBC w/ Diff  Recent Labs     10/16/21  0957 10/17/21  0507   WBC 5.7 4.3   RBC 3.60* 3.84*   HGB 10.6* 11.1*   HCT 33.8* 35.7*    297   MCV 93.9 93.0   MCH 29.4 28.9   MCHC 31.4* 31.1*   NRBCRE 0 0        Trop/NT-ProBNP  Recent Labs     10/16/21  0957 10/17/21  0507   NTPROB 2,056* 1,088*        Tele:  SR    EKG: SR with NSST    Laboratory Data:    Rhythm strip 7/8/2021: Atrial fibrillation-60s  EKG 2/24/2020: Sinus bradycardia-54  EKG 9/11/2018: Sinus rhythm with PACs, nonspecific ST changes    Echo 6/9/2021: EF 60%; mild MR; normal pulmonary pressures  Echo 9/20/2018: EF 55 to 60%; mild AI, mild AS with mean gradient of 8; moderate mitral regurgitation  Echo 10/16/2015: EF 35-40%      Imaging  LAST ECHO/ECHO STRESS:  No valid procedures specified.    LAST EKG:    Encounter Date: 10/16/21   Electrocardiogram 12-Lead   Result Value    Ventricular Rate EKG/Min (BPM) 63    Atrial Rate (BPM) 63    MD-Interval (MSEC) 154    QRS-Interval (MSEC) 84    QT-Interval (MSEC) 408    QTc 418    P Axis (Degrees) 90    R Axis (Degrees) 56    T Axis (Degrees) 152     REPORT TEXT      Sinus rhythm  with  fusion complexes  and  premature atrial complexes  Cannot rule out  Anterior infarct  , age undetermined  ST And  T wave abnormality, consider lateral ischemia  Abnormal ECG  When compared with ECG of  09-JUN-2021 17:08,  Sinus rhythm  has replaced  Atrial flutter  T wave inversion now evident in  Anterior leads  QT has lengthened         LAST X-RAY:  === 10/16/21 ===    XR CHEST PA AND LATERAL 2 VIEWS    - Narrative -  EXAM: XR CHEST PA AND LATERAL 2 VIEWS    CLINICAL INDICATION: Chest pain and shortness of breath    COMPARISON: 6/7/2021    FINDINGS: Cardiomegaly with edema. More focal opacity in the right middle  lobe. Could be related to effusion loculated or infiltrate.    Changes from valve replacement.    Blunting of the posterior costophrenic angles consistent with pleural  effusions.    Post vertebroplasty changes.    - Impression -  Cardiomegaly with edema.    Loculated fluid right middle lobe and/or infiltrate. Follow-up. Small  pleural effusions.      Electronically Signed by: TERESITA VILLEGAS M.D.  Signed on: 10/16/2021 11:06 AM      === 06/07/21 ===    XR CHEST PA OR AP 1 VIEW    - Narrative -  EXAM: XR CHEST PA OR AP 1 VIEW    INDICATION: shortness of breath.  SOB.    COMPARISON: 04/29/2021    TECHNIQUE: Portable upright AP radiograph of the chest    FINDINGS: EKG leads project at the patient's chest.  Postsurgical changes  of valve annuloplasty are seen in mitral and tricuspid positions.  The  cardiomediastinal silhouette is mildly prominent, at least in part due to  AP technique, unchanged since the prior exam.  Atherosclerotic  calcifications are seen.  The right hemidiaphragm is slightly elevated.  Relative lucencies of the upper lung zones are unchanged.  There is mild  interstitial prominence and perihilar haziness.  Linear densities are seen  at the lungs.  There is mild progression of right hilar density.  No acute  fracture is seen.  Clips project at the  neck.    - Impression -  1.  Mildly prominent cardiac silhouette with mild perihilar interstitial  edema.  2.  Right hilar prominence can be seen with prominent vasculature or  possibly lymphadenopathy.  3.  Subsegmental atelectasis or scarring without lung opacity concerning  for consolidation.  4.  Findings that can be seen with COPD.    Electronically Signed by: PATTY TERRY M.D.  Signed on: 6/7/2021 11:48 PM      === 04/29/21 ===    XR CHEST PA OR AP 1 VIEW    - Narrative -  EXAM: XR CHEST PA OR AP 1 VIEW    INDICATION: Chest Pain.  Face and tongue swelling.    COMPARISON: 06/15/2020    TECHNIQUE: Portable upright AP radiograph of the chest    FINDINGS: EKG leads project at the patient's chest.  The cardiomediastinal  silhouette is mildly prominent, at least in part due to AP technique,  unchanged since the prior exam.  Posterior changes of valve annuloplasty is  are seen in mitral and tricuspid positions.  Mild atherosclerotic  calcification is seen.  The right hemidiaphragm is mildly elevated.  Relative lucencies of the upper lung zones are unchanged, which can be seen  with COPD.  There is mild interstitial prominence and perihilar haziness.  Linear densities are noted at the lungs.  No lobar consolidation is seen.  No acute-appearing fracture is identified.  Clips project at the neck.    - Impression -  1.  Mildly prominent cardiac silhouette with mild perihilar interstitial  edema, however these findings may be exaggerated due to AP technique.  2.  Subsegmental atelectasis or scarring without lung opacity concerning  for consolidation.    Electronically Signed by: PATTY TERRY M.D.  Signed on: 4/29/2021 10:04 AM          Thank you for allowing me to participate in the care of your patient.       *This note was dictated, in part, using a computerized recognition system and may contain unintended errors.                     175

## 2022-09-27 NOTE — ED BEHAVIORAL HEALTH ASSESSMENT NOTE - SUMMARY
Patient a 78 y/o male, domiciled at [facility], unknown past Psychiatric hx, no prior SI/SA, unknown drug abuse hx , medically has DM-2; HTN; CAD S/P stents; CKD; [facility] due to b/l LE pain for past 2 weeks.     Patient in bed AAOX3, endorses that he was dragged in here, and would take revenge on those people and was the reason why he came here, later he endorses that he also has b/l LE pain and thus was brought in here for evaluation. Unsure about his hx as he seems very fixated on issues that he was dragged in here and is  with 5 children an d1 child  and now has 4 children and has no contacts with them and he lives at [facility] for past 2 years. He is alert, oriented and not aware of his medical issues and when asked he endorses that he only has HTN, later after prompting he endorses that he is also diabetic. He endorses that he worked for the [employer] and was killing people and when asked how many people, he endorses that he lost count and may be around 30 people , spent 2 days in residential and let him go. he denied being depressed, denied A/V/H or paranoid beliefs, and also denied any Suicide now or hurting other people and at the same time he endorses that he came here for revenge, which proves inconsistencies in hx. He has fair sleep/appetite. Denied any hx of Dementia as well.    Unable to get any collateral info as number listed not able to get any answer from [facility] @ [phone], writer later called his sister Jennifer Terrell @ [phone], left a message.    Plan: Hold for collateral info from [facility]/Sister

## 2022-09-27 NOTE — ED PROVIDER NOTE - CLINICAL SUMMARY MEDICAL DECISION MAKING FREE TEXT BOX
76 y/o M with PMHx Afib (not on eliquis after GIB), MGUS, depression, CAD s/p stents, HLD, HTN, CKD, DM2, diverticulosis, Anemia , Diastolic  CHF, and BPH who presents to the ED with b/l leg pain for the past two weeks. Labs, CT, pain meds, reassess.

## 2022-09-27 NOTE — ED PROVIDER NOTE - PHYSICAL EXAMINATION
General: NAD  Head: NC, AT  EENT: no scleral icterus  Cardiac: RRR, no apparent murmurs, no lower extremity edema  Respiratory: CTABL, no respiratory distress   Abdomen: soft, ND, NT, nonperitonitic  MSK/Vascular: full ROM, soft compartments, warm extremities  Neuro: sensation to light touch intact  Psych: calm, cooperative

## 2022-09-27 NOTE — ED PROVIDER NOTE - OBJECTIVE STATEMENT
76 y/o M with PMHx Afib (not on eliquis after GIB), MGUS, depression, CAD s/p stents, HLD, HTN, CKD, DM2, diverticulosis, Anemia , Diastolic  CHF, and BPH who presents to the ED with b/l leg pain for the past two weeks. Patient states that over the past two weeks he has had shooting pain down his b/l LEs. States that this has never happened to him before and denies any trauma. States that he usually ambulates with a walker, but has had difficulties ambulating due to the pain. Denies any aggravating or alleviating factors. Denies any history of DVTs. Endorses some lumbar back pain, however denies any loss of sensation or urinary/bowel incontinence. Denies any fevers, chills, recent illnesses, or other complaints at this time.

## 2022-09-27 NOTE — ED BEHAVIORAL HEALTH ASSESSMENT NOTE - HPI (INCLUDE ILLNESS QUALITY, SEVERITY, DURATION, TIMING, CONTEXT, MODIFYING FACTORS, ASSOCIATED SIGNS AND SYMPTOMS)
Patient a 78 y/o male, domiciled at [facility], unknown past Psychiatric hx, no prior SI/SA, unknown drug abuse hx , medically has DM-2; HTN; CAD S/P stents; CKD; HLD was sent from [facility] due to b/l LE pain for past 2 weeks.     Patient in bed AAOX3, endorses that he was dragged in here, and would take revenge on those people and was the reason why he came here, later he endorses that he also has b/l LE pain and thus was brought in here for evaluation. Unsure about his hx as he seems very fixated on issues that he was dragged in here and is  with 5 children an d1 child  and now has 4 children and has no contacts with them and he lives at [facility] for past 2 years. He is alert, oriented and not aware of his medical issues and when asked he endorses that he only has HTN, later after prompting he endorses that he is also diabetic. He endorses that he worked for the [employer] and was killing people and when asked how many people, he endorses that he lost count and may be around 30 people , spent 2 days in correction and let him go. he denied being depressed, denied A/V/H or paranoid beliefs, and also denied any Suicide now or hurting other people and at the same time he endorses that he came here for revenge, which proves inconsistencies in hx. He has fair sleep/appetite. Denied any hx of Dementia as well.    Unable to get any collateral info as number listed not able to get any answer from [facility] @ [phone], writer later called his sister Jennifer Terrell @ [phone], left a message. Patient a 78 y/o male, domiciled at [facility], unknown past Psychiatric hx, no prior SI/SA, unknown drug abuse hx , medically has DM-2; HTN; CAD S/P stents; CKD; HLD was sent from [facility] due to b/l LE pain for past 2 weeks.     Patient in bed AAOX3, endorses that he was dragged in here, and would take revenge on those people and was the reason why he came here, later he endorses that he also has b/l LE pain and thus was brought in here for evaluation. Unsure about his hx as he seems very fixated on issues that he was dragged in here and is  with 5 children an d1 child  and now has 4 children and has no contacts with them and he lives at [facility] for past 2 years. He is alert, oriented and not aware of his medical issues and when asked he endorses that he only has HTN, later after prompting he endorses that he is also diabetic. He endorses that he worked for the [employer] and was killing people and when asked how many people, he endorses that he lost count and may be around 30 people , spent 2 days in long-term and let him go. he denied being depressed, denied A/V/H or paranoid beliefs, and also denied any Suicide now or hurting other people and at the same time he endorses that he came here for revenge, which proves inconsistencies in hx. He has fair sleep/appetite. Denied any hx of Dementia as well.    Unable to get any collateral info as number listed not able to get any answer from [facility] @ [phone], writer later called his sister Jennifer Terrell @ [phone], left a message.    Addendum: Patient sister Jennifer Terrell called and informed that he has hx of Depression, one prior psychiatric admission at [facility] for 1 year for residence placement  as he was homeless, no other prior Psychiatric issues, has hx of Depression and take Wellbutrin. he was shifted at [facility] from 1st floor to 2nd floor and as he did not like so he made a comment of S/HIP. no prior S/H/I/P. Sister is not aware of further issues he also takes percocet at times which may cause some memory issues and also has por ambulation.

## 2022-09-27 NOTE — ED PROVIDER NOTE - NSFOLLOWUPINSTRUCTIONS_ED_ALL_ED_FT
Suicide Prevention    WHAT YOU NEED TO KNOW:    You may see suicide as the only way to escape emotional or physical pain and suffering. Help is available from people who care about you, and from professionals trained in suicide prevention. Prevention includes everything you and others can do to stop you from taking your life.    DISCHARGE INSTRUCTIONS:    Call your local emergency number (911 in the ), or ask someone to call if:   •You do something on purpose to hurt yourself.      •You make a plan to attempt suicide.      Call your doctor or therapist, or have someone close to you call if:   •You act out in anger, are reckless, or are abusing alcohol or drugs.      •You have serious thoughts of suicide, even with treatment.      •You have more thoughts of suicide when you are alone.      •You stop eating, or you begin to smoke cigarettes or drink alcohol.      •You have questions or concerns about your condition or care.      What to do if you are considering suicide:    •Contact a suicide prevention organization. The following are always available to help you: ?For the National Suicide Prevention Lifeline, call either of the following: ?988      ?1-302.707.7876 (3-261-373-TALK)      ?For the Suicide Hotline, call 1-674.765.2479 (3-330-HVDBGUM)      ?For a list of international numbers: https://save.org/find-help/international-resources/      •Contact your therapist. Your doctor can give you a list of therapists if you do not have one. Do not spend time alone if you have thoughts of ending your life.      •Ask someone to remove items that can be harmful. Do not keep medicines, weapons, or alcohol in your home.      Warning signs of suicide: The following can help you and others recognize that you are struggling:   •Talking about your plan for attempting suicide, or wanting to read or write about death or suicide      •Cutting yourself, burning your skin with cigarettes, or driving recklessly      •Drug or alcohol use, not taking your prescribed medicine, or taking too much      •Not wanting to spend time with others or doing things you enjoy, feeling bored, or not wanting anyone to praise you      •Changes in your appetite, sleep habits, energy levels, or weight      •Feeling angry, or lashing out at others      •A need to give away or throw away your belongings      •Often skipping work      •Suddenly not taking medicine for a mental illness without talking to your healthcare provider      •Suddenly not going to therapy      Treatment for suicidal thoughts:   •Medicines may be given to prevent mood swings, or to decrease anxiety or depression. You will need to take all medicines as directed. A sudden stop can be harmful. It may take 4 to 6 weeks for the medicine to help you feel better.      •Suicide risk assessment means healthcare providers will ask questions about your suicide thoughts and plans. They will ask how often you think about suicide and if you have tried it before. They will ask if you have begun to hurt yourself, such as with cutting or reckless driving. They may ask if you have access to weapons or drugs.      •A safety plan includes a list of people or groups to contact if you have suicidal feelings again. The list may include friends, family members, a spiritual leader, and others you trust. You may be asked to make a verbal agreement or sign a contract that you will not try to harm yourself.      •A therapist can help you identify and change negative feelings or beliefs about yourself. This may help change the way you feel and act. A therapist can also help you find ways to cope with things that cannot be changed.      Manage depression:   •Get help for drug or alcohol abuse. Drugs and alcohol can make suicidal feelings worse and make you more likely to act on them. Drugs and alcohol can also cause or increase depression.      •Talk to someone you trust. Be honest about your thoughts and feelings about suicide. You can call a suicide prevention center if you do not want to talk to someone you know.      •Exercise as directed. Exercise can lift your mood, give you more energy, and make it easier to sleep.       •Eat a variety of healthy foods. Healthy foods include fruits, vegetables, whole-grain breads, lean meats, fish, low-fat dairy products, and beans. Try to eat regularly even if you do not feel hungry. Depression can increase from a lack of nutrition or if you are hungry for long periods of time.      •Create a sleep routine. Try to go to bed and wake up at the same time every day. Let your healthcare provider know if you are having trouble sleeping.      •Take your medicine and go to therapy as directed. Medicine and therapy can help you manage your mental health. Do not stop taking your medicine without talking to your healthcare provider. If you do not like the way a medicine makes you feel, you may be able to try a different medicine.      Follow up with your doctor or therapist as directed: Write down your questions so you remember to ask them during your visits.    For support and more information:   •National Suicide Prevention Lifeline  [facility],[id number]  Phone: 8-657-779-YHHP (7966)  Web Address: http://www.suicidepreventionlifeline.org      •Suicide Awareness Voices of Education  [address] [place], [place] [id number]  Phone: 1-394.791.3266  Web Address: http://www.Fashiolista.org           © Copyright The Society 2022

## 2022-09-28 VITALS
HEART RATE: 55 BPM | SYSTOLIC BLOOD PRESSURE: 156 MMHG | TEMPERATURE: 98 F | OXYGEN SATURATION: 98 % | RESPIRATION RATE: 18 BRPM | DIASTOLIC BLOOD PRESSURE: 76 MMHG

## 2022-09-28 LAB
CULTURE RESULTS: SIGNIFICANT CHANGE UP
SARS-COV-2 RNA SPEC QL NAA+PROBE: SIGNIFICANT CHANGE UP
SPECIMEN SOURCE: SIGNIFICANT CHANGE UP

## 2022-09-28 PROCEDURE — 82962 GLUCOSE BLOOD TEST: CPT

## 2022-09-28 PROCEDURE — U0003: CPT

## 2022-09-28 PROCEDURE — 85025 COMPLETE CBC W/AUTO DIFF WBC: CPT

## 2022-09-28 PROCEDURE — 81001 URINALYSIS AUTO W/SCOPE: CPT

## 2022-09-28 PROCEDURE — U0005: CPT

## 2022-09-28 PROCEDURE — 80053 COMPREHEN METABOLIC PANEL: CPT

## 2022-09-28 PROCEDURE — 87086 URINE CULTURE/COLONY COUNT: CPT

## 2022-09-28 PROCEDURE — 99285 EMERGENCY DEPT VISIT HI MDM: CPT

## 2022-09-28 PROCEDURE — 85610 PROTHROMBIN TIME: CPT

## 2022-09-28 PROCEDURE — 80307 DRUG TEST PRSMV CHEM ANLYZR: CPT

## 2022-09-28 PROCEDURE — 36415 COLL VENOUS BLD VENIPUNCTURE: CPT

## 2022-09-28 NOTE — ED ADULT NURSE NOTE - NSICDXPASTMEDICALHX_GEN_ALL_CORE_FT
PAST MEDICAL HISTORY:  Afib on AC    Anemia of chronic disease Monoclonal Gammopathy-MGUS  pRBC transfusion 10/15/21    Anxiety Depression  multiple psych medications    Atrial fibrillation first documented on EKG 10/7/2021    CAD (coronary artery disease) s/p stents (not on plavix)    Chronic diastolic congestive heart failure     Diverticulosis c/b GIB in 2020    H/O: upper GI bleed 4/22, small Bowel Bleed, 5 u pRBC Transfusion 4/22  S/P Colonoscopy- Diverticulosis  S/P Capsule Endoscopy done -[facility] -No active bleeding    History of diverticulitis 07/2021    HLD (hyperlipidemia)     HTN (hypertension) c/b multiple episodes of hypertensive urgency    Multiple thyroid nodules     Stage 3 chronic kidney disease     Type 2 diabetes mellitus not on home insulin/ Meds

## 2022-12-08 NOTE — ED PROVIDER NOTE - NSICDXPASTMEDICALHX_GEN_ALL_CORE_FT
PAST SURGICAL HISTORY:  History of cholecystectomy      PAST MEDICAL HISTORY:  Afib on AC    Anemia of chronic disease Monoclonal Gammopathy-MGUS  pRBC transfusion 10/15/21    Anxiety Depression  multiple psych medications    Atrial fibrillation first documented on EKG 10/7/2021    CAD (coronary artery disease) s/p stents (not on plavix)    Chronic diastolic congestive heart failure     Diverticulosis c/b GIB in 2020    H/O: upper GI bleed 4/22, small Bowel Bleed, 5 u pRBC Transfusion 4/22  S/P Colonoscopy- Diverticulosis  S/P Capsule Endoscopy done -[facility] -No active bleeding    History of diverticulitis 07/2021    HLD (hyperlipidemia)     HTN (hypertension) c/b multiple episodes of hypertensive urgency    Multiple thyroid nodules     Stage 3 chronic kidney disease     Type 2 diabetes mellitus not on home insulin/ Meds

## 2023-01-03 NOTE — PATIENT PROFILE ADULT - INTERNATIONAL TRAVEL
OFFICE VISIT      Patient: Lety Harris Date of Service: 1/3/2023   : 1992 MRN: [id number]     CHIEF COMPLAINT:  Lety Harris is a 30 year old female who presents today for   Chief Complaint   Patient presents with   • Follow-up     Medication refill       HPI:  Virtual visit on ADD meds refill  She is well   Her son has RSV    PAST MEDICAL HISTORY:  Past Medical History:   Diagnosis Date   • ADHD    • Herpes    • Menorrhagia with irregular cycle        MEDICATIONS:  Current Outpatient Medications   Medication Sig   • amphetamine-dextroamphetamine (ADDERALL) 20 MG tablet Take 1 tablet by mouth daily.   • acyclovir (ZOVIRAX) 800 MG tablet Take 1 tablet by mouth daily. \"Not taking/as needed\"   • Ferrous Sulfate (Iron) 325 (65 Fe) MG Tab Take 1 tablet by mouth daily.     No current facility-administered medications for this visit.       ALLERGIES:  ALLERGIES:  No Known Allergies    PAST SURGICAL HISTORY:  Past Surgical History:   Procedure Laterality Date   •  procedures  2018   •  delivery+postpartum care     • Pap smear, thin prep      normal       FAMILY HISTORY:  Family History   Problem Relation Age of Onset   • Hyperlipidemia Mother    • Emphysema Mother    • Stroke/TIA Father        SOCIAL HISTORY:  Social History     Tobacco Use   • Smoking status: Never   • Smokeless tobacco: Never   Vaping Use   • Vaping Use: never used   Substance Use Topics   • Alcohol use: Never   • Drug use: Never       OBGYN:  LMP:  22  regular cycles.  Menses is approximately 4 days.    Recent PHQ 2/9 Score    PHQ 2:  Date Adult PHQ 2 Score Adult PHQ 2 Interpretation   2022 0 No further screening needed             Review of Systems   Constitutional: Negative.    Respiratory: Negative.    Cardiovascular: Negative.    Gastrointestinal: Negative.    Neurological: Negative.    Psychiatric/Behavioral: Negative.          OBJECTIVE:     Visit Vitals  LMP 2022 (Exact Date)       Physical  Exam  Vitals and nursing note reviewed.   Constitutional:       Appearance: She is well-developed.   HENT:      Head: Normocephalic and atraumatic.   Eyes:      Pupils: Pupils are equal, round, and reactive to light.   Cardiovascular:      Rate and Rhythm: Normal rate and regular rhythm.      Heart sounds: Normal heart sounds.   Pulmonary:      Effort: Pulmonary effort is normal. No respiratory distress.      Breath sounds: Normal breath sounds.   Musculoskeletal:      Cervical back: Normal range of motion.   Skin:     General: Skin is warm and dry.   Neurological:      Mental Status: She is alert and oriented to person, place, and time.   Psychiatric:         Behavior: Behavior normal.         ASSESSMENT AND PLAN:     Attention deficit hyperactivity disorder (ADHD), unspecified ADHD type  cpm with meds  - amphetamine-dextroamphetamine (ADDERALL) 20 MG tablet; Take 1 tablet by mouth daily.  Dispense: 30 tablet; Refill: 0        Instructions provided as documented in the AVS.      The patient indicated understanding of the diagnosis and agreed with the plan of care.      Sheree Leigh MD   No

## 2023-02-08 NOTE — ED ADULT NURSE NOTE - NS ED NURSE AMBULANCES2
Component      Latest Ref Rng & Units 7/9/2021 8/23/2022 2/6/2023   WBC      4.2 - 11.0 K/mcL 5.4  8.0   RBC      4.50 - 5.90 mil/mcL 4.94  4.78   HGB      13.0 - 17.0 g/dL 15.1  14.5   HCT      39.0 - 51.0 % 47.1  42.0   MCV      78.0 - 100.0 fl 95.3  87.9   MCH      26.0 - 34.0 pg 30.6  30.3   MCHC      32.0 - 36.5 g/dL 32.1  34.5   PLT      140 - 450 K/mcL 206  198   RDW-CV      11.0 - 15.0 % 13.0  12.3   RDW-SD      39.0 - 50.0 fL 45.3  40.1   NRBC      <=0 /100 WBC 0     VALPROIC ACID      50 - 125 mcg/mL 113 70 85   AST/SGOT      <=37 Units/L 22  20   ALT/SGPT      <64 Units/L 25  27   Lamotrigine      3.0 - 15.0 ug/mL  2.5 (L) 2.4 (L)     Last appointment date: 8/31/22  Next appointment date: 3/1/23  CURRENT MEDICATIONS:  Current Outpatient Medications   Medication Sig Dispense Refill   • [START ON 3/1/2023] amphetamine-dextroamphetamine (Adderall XR) 25 MG 24 hr capsule Take 1 capsule by mouth daily. Do not start before March 1, 2023. 30 capsule 0   • amphetamine-dextroamphetamine (Adderall XR) 25 MG 24 hr capsule Take 1 capsule by mouth daily. 30 capsule 0   • lamoTRIgine (LaMICtal) 25 MG tablet TAKE 2 TABLETS BY MOUTH DAILY 60 tablet 11   • zaleplon (SONATA) 5 MG capsule Take 1 capsule by mouth nightly as needed (insomnia). 30 capsule 1   • divalproex (DEPAKOTE ER) 500 MG 24 hr ER tablet Take 3 tablets by mouth nightly. 270 tablet 3     No current facility-administered medications for this visit.      Ambulnz
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
## 2023-03-31 NOTE — PROGRESS NOTE ADULT - SUBJECTIVE AND OBJECTIVE BOX
Detail Level: Generalized Detail Level: Detailed [facility] -- Fifi Bliss, Kalpesh Valdovinos, Abraham Sheridan Savella, Goodger: Office # [phone]    Follow Up:  Hx of Afib on Eliquis, now with hematuria; CAD s/p stents    Subjective/Observations: Patient seen and examined, awake, alert, resting comfortably in bed, denies chest pain, dyspnea, palpitations or dizziness, CBI ongoing, Tolerating room air.     REVIEW OF SYSTEMS: All review of systems is negative for eye, ENT, GI, , allergic, dermatologic, musculoskeletal and neurologic except as described above    PAST MEDICAL & SURGICAL HISTORY:  HTN (hypertension)  c/b multiple episodes of hypertensive urgency    HLD (hyperlipidemia)    Atrial fibrillation  first documented on EKG 10/7/2021    CAD (coronary artery disease)  s/p stents (not on plavix)    Type 2 diabetes mellitus  not on home insulin/ Meds    Anxiety  Depression  multiple psych medications    History of diverticulitis  07/2021    Diverticulosis  c/b GIB in 2020    Afib  on AC    Stage 3 chronic kidney disease    Anemia of chronic disease  Monoclonal Gammopathy-MGUS  pRBC transfusion 10/15/21    Chronic diastolic congestive heart failure    Multiple thyroid nodules    H/O: upper GI bleed  4/22, small Bowel Bleed, 5 u pRBC Transfusion 4/22  S/P Colonoscopy- Diverticulosis  S/P Capsule Endoscopy done -[facility] -No active bleeding    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  Had surgery ( April 2013 )    S/P tonsillectomy    S/P arthroscopic knee surgery  Bilateral ( 2005 )    Torsion of testicle  Had surgery at age 14    Pilonidal cyst  Had surgery ( 1969 )    S/P cataract surgery  Bilateral    H/O hernia repair        MEDICATIONS  (STANDING):  amLODIPine   Tablet 10 milliGRAM(s) Oral daily  atorvastatin 10 milliGRAM(s) Oral at bedtime  budesonide  80 MICROgram(s)/formoterol 4.5 MICROgram(s) Inhaler 2 Puff(s) Inhalation two times a day  carvedilol 3.125 milliGRAM(s) Oral every 12 hours  cloNIDine 0.2 milliGRAM(s) Oral two times a day  cyanocobalamin 1000 MICROGram(s) Oral daily  dextrose 5%. 1000 milliLiter(s) (100 mL/Hr) IV Continuous <Continuous>  dextrose 5%. 1000 milliLiter(s) (50 mL/Hr) IV Continuous <Continuous>  dextrose 50% Injectable 25 Gram(s) IV Push once  dextrose 50% Injectable 12.5 Gram(s) IV Push once  dextrose 50% Injectable 25 Gram(s) IV Push once  doxazosin Oral Tab/Cap - Peds 4 milliGRAM(s) Oral two times a day  famotidine    Tablet 40 milliGRAM(s) Oral two times a day  folic acid 1 milliGRAM(s) Oral daily  furosemide    Tablet 40 milliGRAM(s) Oral daily  glucagon  Injectable 1 milliGRAM(s) IntraMuscular once  hydrALAZINE 100 milliGRAM(s) Oral three times a day  insulin lispro (ADMELOG) corrective regimen sliding scale   SubCutaneous three times a day before meals  insulin lispro (ADMELOG) corrective regimen sliding scale   SubCutaneous at bedtime  isosorbide   mononitrate ER Tablet (IMDUR) 30 milliGRAM(s) Oral daily  lactobacillus acidophilus 1 Tablet(s) Oral two times a day  lamoTRIgine 100 milliGRAM(s) Oral daily  pantoprazole    Tablet 40 milliGRAM(s) Oral before breakfast  polyethylene glycol 3350 17 Gram(s) Oral daily  potassium chloride   Powder 40 milliEquivalent(s) Oral once  senna 2 Tablet(s) Oral at bedtime  venlafaxine XR. 150 milliGRAM(s) Oral daily    MEDICATIONS  (PRN):  acetaminophen     Tablet .. 650 milliGRAM(s) Oral every 6 hours PRN Temp greater or equal to 38C (100.4F), Mild Pain (1 - 3)  bisacodyl Suppository 10 milliGRAM(s) Rectal daily PRN Constipation  dextrose Oral Gel 15 Gram(s) Oral once PRN Blood Glucose LESS THAN 70 milliGRAM(s)/deciliter  ondansetron Injectable 4 milliGRAM(s) IV Push every 8 hours PRN Nausea and/or Vomiting  traMADol 50 milliGRAM(s) Oral every 8 hours PRN Moderate Pain (4 - 6)    Allergies    No Known Allergies    Intolerances      Vital Signs Last 24 Hrs  T(C): 36.6 (18 Apr 2022 05:20), Max: 36.8 (17 Apr 2022 20:21)  T(F): 97.9 (18 Apr 2022 05:20), Max: 98.2 (17 Apr 2022 20:21)  HR: 71 (18 Apr 2022 05:20) (68 - 75)  BP: 160/63 (18 Apr 2022 05:20) (114/71 - 160/63)  BP(mean): --  RR: 17 (18 Apr 2022 05:20) (17 - 17)  SpO2: 95% (18 Apr 2022 05:20) (95% - 96%)  I&O's Summary    17 Apr 2022 07:01  -  18 Apr 2022 07:00  --------------------------------------------------------  IN: 6500 mL / OUT: 8330 mL / NET: -1830 mL          TELE: Not on telemetry   PHYSICAL EXAM:  Appearance: NAD, no distress, alert, well developed  HEENT: Moist Mucous Membranes, Anicteric  Cardiovascular: Regular rate and rhythm, Normal S1 S2, No JVD, No murmurs, No rubs, gallops or clicks  Respiratory: Non-labored, Clear to auscultation, No rales, No rhonchi, No wheezing.   Gastrointestinal:  Soft, Non-tender, + BS  : + 3 way mckinnon, CBI ongoing   Neurologic: Non-focal  Skin: Warm and dry, No visible rashes or ulcers, No ecchymosis, No cyanosis  Musculoskeletal: No clubbing, No cyanosis, No joint swelling/tenderness  Psychiatry: Mood & affect appropriate  Lymph: No peripheral edema.     LABS: All Labs Reviewed:                        8.4    7.50  )-----------( 241      ( 18 Apr 2022 08:57 )             25.6                         8.4    5.81  )-----------( 241      ( 17 Apr 2022 09:13 )             25.8                         8.5    4.58  )-----------( 219      ( 16 Apr 2022 07:14 )             26.3     18 Apr 2022 08:57    140    |  106    |  28     ----------------------------<  104    3.4     |  25     |  1.80   17 Apr 2022 09:13    142    |  108    |  22     ----------------------------<  107    4.0     |  28     |  1.80   16 Apr 2022 07:14    141    |  110    |  20     ----------------------------<  97     4.2     |  24     |  1.60     Ca    9.0        18 Apr 2022 08:57  Ca    9.5        17 Apr 2022 09:13  Ca    9.4        16 Apr 2022 07:14                      12 Lead ECG:   Ventricular Rate 52 BPM    Atrial Rate 52 BPM    P-R Interval 348 ms    QRS Duration 116 ms    Q-T Interval 496 ms    QTC Calculation(Bazett) 461 ms    P Axis 49 degrees    R Axis -11 degrees    T Axis 179 degrees    Diagnosis Line Sinus bradycardia with 1st degree AV block  Left ventricular hypertrophy with QRS widening and repolarization abnormality ( R in aVL , [place] product )  Abnormal ECG  When compared with ECG of 30-MAR-2022 13:01,  No significant change was found  Confirmed by Bonifacio BARRERA, Victorino ([id number]) on 4/13/2022 10:48:39 AM (04-13-22 @ 09:45)    < from: TTE Echo Complete w/o Contrast w/ Doppler (01.06.22 @ 10:52) >     EXAM:  ECHO TTE WO CON COMP W DOPP         PROCEDURE DATE:  01/06/2022        INTERPRETATION:  INDICATION: Dyspnea  Sonographer PH    Blood Pressure 177/86    Height 172.7 cm     Weight 104.4 kg    Dimensions:  LA 4.2       Normal Values: 2.0 - 4.0 cm  Ao 3.7        Normal Values: 2.0 - 3.8 cm  SEPTUM 1.4       Normal Values: 0.6 - 1.2 cm  PWT 1.4       Normal Values: 0.6 - 1.1 cm  LVIDd 5.2         Normal Values: 3.0 - 5.6 cm  LVIDs 4.2         Normal Values: 1.8 - 4.0 cm      OBSERVATIONS:  Technically difficult study  Mitral Valve: Mitral annular calcification with thickened leaflets, mild   MR.  Aortic Valve/Aorta: Calcified trileaflet aortic valve with decreased   opening. Peak transaortic valve gradient is 29.4 mmHg with a mean   transaortic valve gradient 14.7 mmHg. This consistent with mild aortic   stenosis.  Tricuspid Valve: Mild TR.  Pulmonic Valve: Not well-visualized  Left Atrium: Enlarged  Right Atrium: Not well-visualized  Left Ventricle: Moderate left ventricular hypertrophy with normal   systolic function, estimated LVEF of 55%.  Right Ventricle: Grossly normal size and systolic function.  Pericardium: no significant pericardial effusion.  Pulmonary/RV Pressure: estimated PA systolic pressure of 32mmHg        IMPRESSION:  Technically difficult study  Moderate left ventricular hypertrophy with normal systolic function,   estimated LVEF of 55%.  Grossly normal RV size and systolic function.  Calcified trileaflet aortic valve with mild aortic stenosis  Mild MR andTR.  No significant pericardial effusion.    --- End of Report ---              VIET GREENWOOD MD; Attending Cardiologist  This document has been electronically signed. Jan 7 2022 11:19AM    < end of copied text >     Detail Level: Zone

## 2023-04-24 NOTE — DISCHARGE NOTE PROVIDER - NSDCHHNEEDSERVICE_GEN_ALL_CORE
CERTIFICATE OF WORK       April 24, 2023      Re: Levon Snowden  [address] [place] [address]      This is to certify that Levon Snowden has been under my care from 4/24/2023 and can return to regular work on 04/25/23.     RESTRICTIONS: Patient is getting surgery on 05/25/23 and will be unable to work for 3 months after surgery.       REMARKS: None        SIGNATURE:___________________________________________          Jennifer Adams DPM  [facility] [place] [facility]  [address] [place] [address]  Dept Phone: [phone]   Rehabilitation services

## 2023-05-04 NOTE — ED ADULT TRIAGE NOTE - NSPATIENTFLAG_GEN_A_ER
[FreeTextEntry1] : Patient was instructed to target his T. Cholesterol to less than 200 mg/dl and LDL cholesterol to less than 100 mg/dl.\par His lab results were reviewed with him in detail.\par EKG: NSR Normal findings\par Exercise and weight loss was advised.\par Continue amlodipine 2.5 mg QD.\par Patient was advised to monitor his BP readings at home and to keep a log of them.\par Patient was advised to repeat a BMP, CBC , fasting lipid profile and hepatic panel.\par RV in 12/23.
Purple DH (Discharge Huddle; Vulnerable Patient)

## 2023-05-07 NOTE — CONSULT NOTE ADULT - CONSULT REQUESTED BY NAME
05/07/23 1100   Appointment Info   Signing Clinician's Name / Credentials (PT) Lynn Magallon PT   Living Environment   People in Home spouse   Current Living Arrangements house   Home Accessibility stairs within home   Number of Stairs, Within Home, Primary eight   Stair Railings, Within Home, Primary railings safe and in good condition;railing on right side (ascending)   Transportation Anticipated car, drives self   Self-Care   Usual Activity Tolerance good   Current Activity Tolerance moderate   Regular Exercise No   Equipment Currently Used at Home none   Fall history within last six months no   General Information   Onset of Illness/Injury or Date of Surgery 05/06/23   Referring Physician Dr. Haskins   Patient/Family Therapy Goals Statement (PT) To go home   Pertinent History of Current Problem (include personal factors and/or comorbidities that impact the POC) Pt is a 64 year old male admitted with a fib with RVR   Existing Precautions/Restrictions cardiac   Cognition   Affect/Mental Status (Cognition) WFL   Orientation Status (Cognition) oriented x 4   Follows Commands (Cognition) WFL   Range of Motion (ROM)   Range of Motion ROM is WFL   Strength (Manual Muscle Testing)   Strength (Manual Muscle Testing) strength is WFL   Bed Mobility   Bed Mobility no deficits identified   Transfers   Transfers no deficits identified   Gait/Stairs (Locomotion)   Comment, (Gait/Stairs) Independent   Balance   Balance Comments Good   Clinical Impression   Criteria for Skilled Therapeutic Intervention Yes, treatment indicated   PT Diagnosis (PT) Impaired endurance   Influenced by the following impairments Decreased endurance   Functional limitations due to impairments Difficulty with long distance ambulation   Clinical Presentation (PT Evaluation Complexity) Stable/Uncomplicated   Clinical Presentation Rationale VSS, pain controlled   Clinical Decision Making (Complexity) low complexity   Planned Therapy Interventions (PT) 
patient/family education;progressive activity/exercise;risk factor education;home program guidelines   Risk & Benefits of therapy have been explained evaluation/treatment results reviewed;care plan/treatment goals reviewed;risks/benefits reviewed;current/potential barriers reviewed;participants voiced agreement with care plan;participants included;patient   PT Total Evaluation Time   PT Eval, Low Complexity Minutes (38085) 10   PT Discharge Planning   PT Plan Progress activity tolerance, review signs/symptoms of activity intolernce   PT Discharge Recommendation (DC Rec) home   PT Rationale for DC Rec Pt independent with mobility and safe to return to home environment   PT Brief overview of current status Independent       
Ahmet
ED

## 2023-05-15 NOTE — ED ADULT NURSE NOTE - NSSUHOSCREENINGYN_ED_ALL_ED
CHIEF CONCERN: Left shoulder pain    HISTORY OF PRESENT ILLNESS: Mr. Mehta is a pleasant 57-year-old man referred by Dr. Moore for left shoulder pain. Per chart review, patient received a subacromial left shoulder injection 3/6/23 and glenohumeral injection 12/14/22. He reports the most recent injection in March, followed by PT, was promising, but in the past two weeks symptoms recurred. Patient reports pain underneath the AC joint, which extends down the arm with cross body adduction. Patient had suffered dislocations and injuries to the left shoulder decades ago from playing volleyball.    Past Medical History:   Diagnosis Date     Anxiety      Back pain      Depressive disorder      Hyperlipidemia      Hypertension      Meniere's disease, unspecified      Pain medication agreement signed 8/20/2010     Sleep apnea, unspecified type        Past Surgical History:   Procedure Laterality Date     BUNIONECTOMY RT/LT  09/05/08    Both feet     COLONOSCOPY N/A 12/28/2016    Procedure: COMBINED COLONOSCOPY, SINGLE OR MULTIPLE BIOPSY/POLYPECTOMY BY BIOPSY;  Surgeon: Indra Jernigan MD;  [facility]     COLONOSCOPY N/A 9/27/2022    Procedure: COLONOSCOPY, FLEXIBLE, WITH LESION REMOVAL USING SNARE;  Surgeon: Pbalo Ferris MD;  [facility]     DISCECTOMY, FUSION CERVICAL ANTERIOR ONE LEVEL, COMBINED N/A 7/5/2017    Procedure: COMBINED DISCECTOMY, FUSION CERVICAL ANTERIOR ONE LEVEL;  Cervical 6-7, Anterior cervical discectomy fusion;  Surgeon: Mike Mckenna MD;  [facility]     DISCECTOMY, FUSION CERVICAL ANTERIOR ONE LEVEL, COMBINED N/A 12/19/2018    Procedure: cervical 5-6 anterior cervical discectomy fusion;  Surgeon: Mike Mckenna MD;  [facility]     HERNIORRHAPHY UMBILICAL N/A 8/11/2017    Procedure: HERNIORRHAPHY UMBILICAL;  open umbilical hernia repair;  Surgeon: Boni Story MD;  [facility]     INJECT BLOCK MEDIAL BRANCH CERVICAL/THORACIC/LUMBAR Bilateral 8/12/2021     Procedure: Left L3, Left L4, Left L5, Right L3, Right L4 and Right L5 medial branch nerve blocks using fluoroscopic guidance and contrast control;  Surgeon: Darryn Mcdaniel MD;  [facility]     INJECT BLOCK MEDIAL BRANCH CERVICAL/THORACIC/LUMBAR N/A 9/9/2021    Procedure: Left L3, Left L4, Left L5, Right L3, Right L4 and Right L5 medial branch nerve blocks using fluoroscopic guidance and contrast control;  Surgeon: Darryn Mcdaniel MD;  [facility]     INJECT EPIDURAL CERVICAL N/A 8/22/2019    Procedure: INJECTION, SPINE, CERVICAL 6-7 EPIDURAL;  Surgeon: Darryn Mcdaniel MD;  [facility]     INJECT EPIDURAL CERVICAL Left 3/9/2023    Procedure: Cervical 7-Thoracic 1 Interlaminar epidural steroid injection using fluoroscopic guidance with contrast dye, Left;  Surgeon: Darryn Mcdaniel MD;  [facility]     INJECT EPIDURAL LUMBAR N/A 11/9/2017    Procedure: INJECT EPIDURAL LUMBAR;  lumbar 4-5 epidural steroid injection ;  Surgeon: Darryn Mcdaniel MD;  [facility]     INJECT EPIDURAL LUMBAR N/A 12/28/2017    Procedure: INJECT EPIDURAL LUMBAR;  lumbar epidural injection;  Surgeon: Darryn Mcdaniel MD;  [facility]     INJECT EPIDURAL LUMBAR Bilateral 5/13/2021    Procedure: Bilateral Lumbar 3-4 Epidural Steroid Injection;  Surgeon: Darryn Mcdaniel MD;  [facility]     INJECT EPIDURAL TRANSFORAMINAL Bilateral 8/23/2018    Procedure: INJECT EPIDURAL TRANSFORAMINAL;  transforaminal bilateral lumbar 3-4 steroid injection;  Surgeon: Darryn Mcdaniel MD;  [facility]     INJECT EPIDURAL TRANSFORAMINAL Bilateral 11/8/2018    Procedure: INJECT EPIDURAL TRANSFORAMINAL lumbar 3-4;  Surgeon: Darryn Mcdaniel MD;  [facility]     INJECT EPIDURAL TRANSFORAMINAL Bilateral 6/14/2019    Procedure: INJECTION, EPIDURAL, TRANSFORAMINAL LUMBAR 3-4 BILATERAL;  Surgeon: Darryn Mcdaniel MD;  [facility]     INJECT EPIDURAL TRANSFORAMINAL Bilateral 5/22/2020    Procedure: lumbar 3-4 bilateral transforaminal  epidural steroid injection;  Surgeon: Darryn Mcdaniel MD;  [facility]     INJECT EPIDURAL TRANSFORAMINAL Bilateral 9/24/2020    Procedure: INJECTION, EPIDURAL, TRANSFORAMINAL  LUMBAR 3-4 APPROACH;  Surgeon: Darryn Mcdaniel MD;  [facility]     INJECT JOINT SACROILIAC Bilateral 10/13/2022    Procedure: Fluoroscopically-guided injection of the Bilateral sacroiliac joints with Bilateral kerrie Sacroiliac joint ligaments infiltration over the sacrum;  Surgeon: Darryn Mcdaniel MD;  [facility]     PE TUBES  2006    left ear     RADIO FREQUENCY ABLATION PULSED CERVICAL Bilateral 10/1/2021    Procedure: RADIOFREQUENCY ABLATION lumbar 3, 4, 5 bilateral;  Surgeon: Darryn Mcdaniel MD;  [facility]     ZZHC COLONOSCOPY W/WO BRUSH/WASH  12/27/2005     ZZHC CREATE EARDRUM OPENING,GEN ANESTH  09/27/2007    Pe tube, Left endolymphatic sac enhancement.     ZZHC MASTOIDECTOMY,COMPLETE  09/27/2007       Current Outpatient Medications   Medication Sig Dispense Refill     Acetaminophen (TYLENOL PO) Take 1,000 mg by mouth 3 times daily       amLODIPine (NORVASC) 5 MG tablet Take 1 tablet (5 mg) by mouth daily 90 tablet 3     atorvastatin (LIPITOR) 10 MG tablet Take 10 mg by mouth daily       cyclobenzaprine (FLEXERIL) 5 MG tablet 1-2 tablets three times daily as needed 90 tablet 2     ibuprofen (ADVIL/MOTRIN) 800 MG tablet Take 800 mg by mouth 3 times daily       losartan-hydrochlorothiazide (HYZAAR) 50-12.5 MG tablet TAKE 2 TABLETS BY MOUTH DAILY 180 tablet 1     naloxone (NARCAN) 4 MG/0.1ML nasal spray Spray 1 spray (4 mg) into one nostril alternating nostrils once as needed for opioid reversal every 2-3 minutes until assistance arrives (Patient not taking: Reported on 9/21/2022) 0.2 mL 1     nortriptyline (PAMELOR) 50 MG capsule Take 1 capsule (50 mg) by mouth At Bedtime 90 capsule 1     order for DME Equipment being ordered: TENS Pads as directed 1 Device 0     oxyCODONE (ROXICODONE) 5 MG tablet Take 1-2 tablets (5-10  mg) by mouth every 6 hours as needed for severe pain Must last 30 days 165 tablet 0     pregabalin (LYRICA) 150 MG capsule TAKE 1 CAPSULE BY MOUTH 3 TIMES A DAY 90 capsule 1     Pregabalin (LYRICA) 200 MG capsule TAKE 1 CAPSULE BY MOUTH EVERY DAY 30 capsule 1     sildenafil (VIAGRA) 100 MG tablet Take 0.5-1 tablets ( mg) by mouth daily as needed Take 30 min to 4 hours before intercourse.  Never use with nitroglycerin, terazosin or doxazosin. 6 tablet 7          Allergies   Allergen Reactions     No Known Drug Allergy        SOCIAL HISTORY:    Social History     Socioeconomic History     Marital status: Single     Spouse name: Not on file     Number of children: 2     Years of education: Not on file     Highest education level: Not on file   Occupational History     Employer: [employer]   Tobacco Use     Smoking status: Never     Smokeless tobacco: Never   Vaping Use     Vaping status: Never Used     Passive vaping exposure: Yes   Substance and Sexual Activity     Alcohol use: Yes     Alcohol/week: 0.0 standard drinks of alcohol     Comment: rarely     Drug use: No     Sexual activity: Not Currently     Partners: Female   Other Topics Concern     Parent/sibling w/ CABG, MI or angioplasty before 65F 55M? No   Social History Narrative     Not on file     Social Determinants of Health     Financial Resource Strain: Not on file   Food Insecurity: Not on file   Transportation Needs: Not on file   Physical Activity: Not on file   Stress: Not on file   Social Connections: Not on file   Intimate Partner Violence: Not on file   Housing Stability: Not on file       FAMILY HISTORY: Reviewed in EMR      REVIEW OF SYSTEMS: Positive for that noted in past medical history and history of present illness and otherwise reviewed in EMR     PHYSICAL EXAM:    Adult male in no acute distress. Articulates and communicates with normal affect.  Respirations even and unlabored  Focused upper extremity exam: Skin intact. No  erythema. Sensation intact all dermatomes into the hand to light touch. EPL, FPL, and Intrinsics intact. Right shoulder active motion is FE to 180, ER at side to 70, and IR to T10. Left shoulder active motion is FE to 165, ER to 30, and IR to back pocket.     IMAGING:  Left shoulder XRs were reviewed and I agree with the Impression below:  IMPRESSION: Hypertrophic change of the left AC joint with an old fracture fragment or accessory ossicle along the superior margin. This is corticated and not consistent with acute injury. Mild diastases of the AC joint interval. The left glenohumeral   joint is negative for fracture. There is degenerative change.    Left shoulder MRI dated 2/16/23 was reviewed and I agree with the Impression below:  Impression:  1. Moderate glenohumeral osteoarthrosis, mildly progressed compared to 8/18/2021. Multifocal labral tearing, similar to prior.  2. Similar focal low to moderate grade intrasubstance tear of the junctional posterior supraspinatus/superior infraspinatus at the footprint. Subscapularis tendinosis. No full-thickness rotator cuff  tear. Normal rotator cuff muscle bulk.  3. Tenosynovitis of the extra-articular long head of biceps tendon.    ASSESSMENT:    1. Left acromioclavicular arthrosis, symptomatic  2. Left moderate glenohumeral arthrosis    PLAN:  We discussed the imaging and exam diagnoses of AC and GH degenerative change. We discussed nonoperative and operative treatment options. Patient would like to proceed with an AC joint injection for the left shoulder with Dr. Moore. If the injection provides relief, he can repeat it every 6 months. We briefly discussed operative options if his GH arthrosis progresses.      By signing my name below, I, Samra Perdomo, attest that this documentation has been prepared under the direction of Ghada De Jesus MD.  Electronically Signed: Luiz Kleni.    Provider Disclosure:  I agree with above History, Physical exam and Plan.  I  have reviewed the content of the documentation and have edited it as needed. I have personally performed the services documented here and the documentation accurately represents those services and the decisions I have made.      Ghada De Jesus MD       Yes - the patient is able to be screened

## 2023-05-22 NOTE — ED PROVIDER NOTE - MUSCULOSKELETAL, MLM
Spine appears normal, range of motion is not limited, no muscle or joint tenderness, b/l 1+ pitting edema to lower extremities
No

## 2023-05-30 NOTE — H&P ADULT - GENITOURINARY
Urgent Care - [address] [address]  Phone: [phone]  Fax: [phone]  [facility]: [phone]     Name: Cheri Degroot  Injury Date: 05/30/2023   Employee ID: [id number] Date of First Treatment: 05/30/2023   Company: [employer]      Appointment Time: 02:00 PM Arrived: 2:00   Provider: Mabel Carey PA-C Time Out:     Office Treatment:   1. Facial injury, initial encounter    2. Encounter related to worker's compensation claim    3. Acute pain of left shoulder                     Return Appointment: 05/31/2023 at 10:15 AM.    [address] [id number]    [place]          details…
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
## 2023-07-21 NOTE — ED PROVIDER NOTE - NSICDXFAMILYHX_GEN_ALL_CORE_FT
Assessment/Plan:    Bacterial vaginosis  Reviewed BV with patient and mother in length. Will plan to treat with Metronidazole 500mg BID x 7 days. For prevention: Recommend acidophilus or yogurt daily, avoid irritating soaps or lotions, no tight fitted pants or underwear, avoid bubble baths, do not stay in wet swimsuit. Call office if symptoms are not improving or returning. Problem List Items Addressed This Visit        Genitourinary    Bacterial vaginosis - Primary     Reviewed BV with patient and mother in length. Will plan to treat with Metronidazole 500mg BID x 7 days. For prevention: Recommend acidophilus or yogurt daily, avoid irritating soaps or lotions, no tight fitted pants or underwear, avoid bubble baths, do not stay in wet swimsuit. Call office if symptoms are not improving or returning. Relevant Medications    metroNIDAZOLE (FLAGYL) 500 mg tablet   Other Visit Diagnoses     Vaginal discharge        Relevant Orders    Genital Comprehensive Culture    POCT wet mount            Subjective:      Patient ID: Zulay Watters is a 16 y.o. female. HPI  16 yo seen for vaginal discharge and odor for a few weeks now. Reports yellowish discharge. Reports fishy odor. Mild itching. Denies pelvic pain. Reports menses regular, cramping better with tylenol or Advil. Denies any STD concerns. Patient virginal with no hx of oral intercourse. Uses pads only no tampons. Denies bowel or bladder issues. Denies fever, chills. Going into 9th grade, Lives at home with both parents, feels safe at home and at school. Denies any hx of abuse. The following portions of the patient's history were reviewed and updated as appropriate:   She  has a past medical history of ADD (attention deficit disorder). She   Patient Active Problem List    Diagnosis Date Noted   • Bacterial vaginosis 07/21/2023     She  has no past surgical history on file. Her family history is not on file.   She  reports that she has never smoked. She has never used smokeless tobacco. She reports that she does not drink alcohol and does not use drugs. Current Outpatient Medications   Medication Sig Dispense Refill   • dexmethylphenidate (FOCALIN) 10 MG tablet Take 10 mg by mouth 2 (two) times a day     • metroNIDAZOLE (FLAGYL) 500 mg tablet Take 1 tablet (500 mg total) by mouth every 12 (twelve) hours for 7 days 14 tablet 0     No current facility-administered medications for this visit. She is allergic to amoxicillin and cefazolin. .    Review of Systems   Constitutional: Negative for fatigue, fever and unexpected weight change. HENT: Negative for dental problem and sinus pressure. Eyes: Negative for visual disturbance. Respiratory: Negative for cough, shortness of breath and wheezing. Cardiovascular: Negative for chest pain. Gastrointestinal: Negative for abdominal pain, blood in stool, constipation, diarrhea, nausea and vomiting. Endocrine: Negative for polydipsia. Genitourinary: Negative for difficulty urinating, dyspareunia, dysuria, frequency, hematuria, pelvic pain and urgency. Musculoskeletal: Negative for arthralgias and back pain. Neurological: Negative for dizziness, seizures, light-headedness and headaches. Psychiatric/Behavioral: Negative for suicidal ideas. The patient is not nervous/anxious. Objective:      BP (!) 90/58 (BP Location: Right arm, Patient Position: Sitting, Cuff Size: Standard)   Ht 5' 3.25" (1.607 m)   Wt 51.6 kg (113 lb 12.8 oz)   LMP 07/09/2023 (Exact Date)   BMI 20.00 kg/m²          Physical Exam  Constitutional:       Appearance: Normal appearance. She is well-developed. Neck:      Thyroid: No thyroid mass or thyromegaly. Cardiovascular:      Rate and Rhythm: Normal rate and regular rhythm. Heart sounds: Normal heart sounds. No murmur heard. No friction rub. No gallop. Pulmonary:      Effort: Pulmonary effort is normal. No respiratory distress. Breath sounds: Normal breath sounds. No wheezing or rales. Abdominal:      General: Bowel sounds are normal. There is no distension. Palpations: Abdomen is soft. There is no mass. Tenderness: There is no abdominal tenderness. There is no guarding or rebound. Genitourinary:     Labia:         Right: No rash, tenderness, lesion or injury. Left: No rash, tenderness, lesion or injury. Urethra: No prolapse, urethral pain, urethral swelling or urethral lesion. Vagina: No signs of injury. Vaginal discharge (small amount of discharge seen at vaginal introitus. ) present. No erythema, tenderness or bleeding. Comments: Speculum exam deferred in this virginal patient. Obtained specimen by inserting cotton swab into vaginal canal. Patient tolerated well. Mother was present for entire physical exam.   Musculoskeletal:      Cervical back: Neck supple. Lymphadenopathy:      Cervical: No cervical adenopathy. Upper Body:      Right upper body: No supraclavicular adenopathy. Left upper body: No supraclavicular adenopathy. Skin:     General: Skin is warm and dry. Coloration: Skin is not pale. Findings: No erythema or rash. Neurological:      Mental Status: She is alert and oriented to person, place, and time. Psychiatric:         Behavior: Behavior normal.         Thought Content: Thought content normal.         Judgment: Judgment normal.         Wet mount: clue cells throughout. No trichomonads or yeast. PH: 4 (-)Whiff. FAMILY HISTORY:  FHx: throat cancer  No family history of colorectal cancer

## 2023-08-11 NOTE — H&P ADULT - NSHPPHYSICALEXAM_GEN_ALL_CORE
VITALS:   T(C): 36.9 (01-21-22 @ 09:35), Max: 36.9 (01-21-22 @ 09:35)  HR: 70 (01-21-22 @ 09:35) (70 - 70)  BP: 144/78 (01-21-22 @ 09:35) (144/78 - 144/78)  RR: 17 (01-21-22 @ 09:35) (17 - 17)  SpO2: 99% (01-21-22 @ 09:41) (96% - 99%)    GENERAL: NAD, lying in bed comfortably  HEAD:  Atraumatic, normocephalic  EYES: EOMI, PERRLA, conjunctiva and sclera clear  ENT: Moist mucous membranes  NECK: Supple, no JVD  HEART: irregular rhythm, no m/r/g  LUNGS: Unlabored respirations.  decrease breath sounds @ lung bases; Rales +  ABDOMEN: Soft, nontender, nondistended, +BS  EXTREMITIES: 2+ peripheral pulses bilaterally. No clubbing, cyanosis, or edema  NERVOUS SYSTEM:  A&Ox3, no focal deficits no VITALS:   T(C): 36.9 (01-21-22 @ 09:35), Max: 36.9 (01-21-22 @ 09:35)  HR: 70 (01-21-22 @ 09:35) (70 - 70)  BP: 144/78 (01-21-22 @ 09:35) (144/78 - 144/78)  RR: 17 (01-21-22 @ 09:35) (17 - 17)  SpO2: 99% (01-21-22 @ 09:41) (96% - 99%)    GENERAL: NAD, lying in bed comfortably  HEAD:  Atraumatic, normocephalic  EYES: EOMI, PERRLA, conjunctiva and sclera clear  ENT: Moist mucous membranes  NECK: Supple, no JVD  HEART: irregular rhythm, no m/r/g  LUNGS: Unlabored respirations.  decrease breath sounds @ lung bases; Rales +  ABDOMEN: Soft, nontender, nondistended, +BS  EXTREMITIES: 2+ peripheral pulses bilaterally. Nb/l LE pitting edema 2+  NERVOUS SYSTEM:  A&Ox3, no focal deficits

## 2023-08-24 NOTE — ED ADULT NURSE NOTE - CHPI ED NUR SYMPTOMS POS
SW/CM Discharge Plan  Informed patient is ready for discharge.  Patient to be picked up by daughter. Patient/interested person has been counseled for post hospitalization care.  Patient agrees and understands goals and plan.  Daughter Charlotte, wife, and patient updated on plan for discharge with [facility] for nursing, PT and OT. Initial implementation of the patient’s discharge plan has been arranged, including any devices/equipment needed for discharge. Discharge plan communicated to MD and RN.    Patient’s discharge destination is home with [facility].         CHEST PAIN/DYSPNEA ON EXERTION/SHORTNESS OF BREATH

## 2023-09-08 NOTE — ED ADULT NURSE NOTE - SUICIDE SCREENING QUESTION 2
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   • Other dysphagia 2022   • Chest pain 2022   • Hypertensive urgency 2022   • Frontal headache 2022   • Insomnia 2022   • Ambulatory dysfunction 2022   • Irritable bowel syndrome with diarrhea 2022   • Diarrhea 2022   • Benign prostatic hyperplasia 2021   • Pain in both feet 05/15/2020   • Carpal tunnel syndrome 2019   • Other insomnia 04/15/2019   • Anxiety 04/15/2019   • Hyponatremia 2019   • Abnormal liver function 2019   • Paresthesia 2018   • Arthritis of carpometacarpal [place]) joint of right thumb 2018   • Right wrist pain 2018   • Peripheral edema 2018   • Syncope 2018   • Stage 3a chronic kidney disease ([address]) 2018   • PAF (paroxysmal atrial fibrillation) ([address]) 2018   • Left lumbar radiculopathy 01/10/2017   • Cholecystitis with cholelithiasis 2016   • Joint pain, knee 2014   • Allergic rhinitis 2013   • S/P ablation of ventricular arrhythmia 2013   • 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   • Anemia 2012   • GERD (gastroesophageal reflux disease) 2012   • Glaucoma 2012   • Hiatal hernia 2012   • Hyperlipidemia 2012   • Peripheral vascular disease ([address]) 2012   • Benign essential hypertension 2012      LOS (days): 2  Geometric Mean LOS (GMLOS) (days): 3.60  Days to GMLOS:1.3     OBJECTIVE:  Risk of Unplanned Readmission Score: 15.7         Current admission status: Inpatient   Preferred Pharmacy:   [address], [address] [facility] [address] [place] [address]  Phone: [phone] Fax: [address] [facility]/Leif Abbott - Mik Osei Nitesh  [address]  Phone: [phone] Fax: [phone]    Primary Care Provider: Majo Corral MD    Primary Insurance: [employer] Needs REP  Secondary Insurance:     DISCHARGE DETAILS:                                [address]         Is the patient interested in [address] [facility] at discharge?: Yes  [facility] requested[de-identified] Nursing, Occupational Therapy, Physical [address] [facility] Name[de-identified] Malcolm Provider[de-identified] PCP  Home Health Services Needed[de-identified] Evaluate Functional Status and Safety, Gait/ADL Training, Strengthening/Theraputic Exercises to Improve Function  Homebound Criteria Met[de-identified] Uses an Assist Device (i.e. cane, walker, etc)  Supporting Clincal Findings[de-identified] Limited Endurance                   Treatment Team Recommendation: Home with [facility]  Discharge Destination Plan[de-identified] Home with [address] at Discharge : Family             Spoke to dtrEstrella and gave her the [employer]. She will discuss with pt and her spouse and call CM back. Received call back from [facility] and they prefer to take pt home with home health. Family prefers [facility]. [facility]  [facility], PT, OT. No

## 2023-09-14 NOTE — ED ADULT NURSE NOTE - NS ED PATIENT SAFETY CONCERN
FINAL REPORT
No
PAST MEDICAL HISTORY:  COPD (chronic obstructive pulmonary disease)     DM (diabetes mellitus)     GIB (gastrointestinal bleeding)     H/O aplastic anemia     H/O osteoporosis     Hiatal hernia     History of basal cell cancer     History of IBS     HLD (hyperlipidemia)     PAF (paroxysmal atrial fibrillation) s/p ablation    PMR (polymyalgia rheumatica)

## 2023-09-26 NOTE — PATIENT PROFILE ADULT - NSPROHMDIABETHBA1C_GEN_A_NUR
known Arava Counseling:  Patient counseled regarding adverse effects of Arava including but not limited to nausea, vomiting, abnormalities in liver function tests. Patients may develop mouth sores, rash, diarrhea, and abnormalities in blood counts. The patient understands that monitoring is required including LFTs and blood counts.  There is a rare possibility of scarring of the liver and lung problems that can occur when taking methotrexate. Persistent nausea, loss of appetite, pale stools, dark urine, cough, and shortness of breath should be reported immediately. Patient advised to discontinue Arava treatment and consult with a physician prior to attempting conception. The patient will have to undergo a treatment to eliminate Arava from the body prior to conception.

## 2023-10-06 NOTE — ED ADULT NURSE NOTE - NSFALLRSKASSESSTYPE_ED_ALL_ED
Patient is a 57y old  Male w/ PMH of HTN, HLD, CAD/MI (no stents but had a balloon), T2DM on insulin/metformin/jardiance with hx of DKA, Afib on Eliquis, h/o iron deficiency, Chronic Pain Syndrome with multiple neck surgeries (C2-C7 fusion) on dilaudid and methadone, who presents with a chief complaint of abdominal pain     Subjective: Patient was seen and examined this morning at bedside. Pt reports still having nausea, and was able to take a couple of sips of broth yesterday.  Overnight events: none    REVIEW OF SYSTEMS:  CONSTITUTIONAL: No weakness, fevers or chills  EYES/ENT: No visual changes;  No vertigo or throat pain   NECK: No pain or stiffness  RESPIRATORY: No cough, wheezing, hemoptysis; No shortness of breath  CARDIOVASCULAR: No chest pain or palpitations  GASTROINTESTINAL: +abdominal pain, +nausea, ; No vomiting, diarrhea or constipation.   GENITOURINARY: No dysuria, frequency or hematuria  NEUROLOGICAL: No numbness or weakness  SKIN: No itching, burning, rashes, or lesions       Vital Signs Last 24 Hrs  T(C): 36.6 (06 Oct 2023 12:53), Max: 36.6 (06 Oct 2023 12:53)  T(F): 97.8 (06 Oct 2023 12:53), Max: 97.8 (06 Oct 2023 12:53)  HR: 66 (06 Oct 2023 12:53) (57 - 71)  BP: 153/91 (06 Oct 2023 13:43) (153/91 - 171/95)  BP(mean): --  RR: 18 (06 Oct 2023 12:53) (17 - 18)  SpO2: 96% (06 Oct 2023 12:53) (95% - 96%)    Parameters below as of 06 Oct 2023 12:53  Patient On (Oxygen Delivery Method): room air        PHYSICAL EXAM  Constitutional: Pt lying in bed, awake and alert, NAD  HEENT: EOMI, normocephalic, moist mucous membranes  Neck: Soft and supple,   Respiratory: CTABL, No wheezing, rales or rhonchi  Cardiovascular: S1S2+, RRR, no M/G/R, reproducible chest pain with palpation of Left anterior chest  Gastrointestinal: BS+, soft, obese abdomen, epigastric tenderness, nondistended, no guarding, no rebound  Extremities: No calf pain or edema  Vascular: Peripheral pulses present  Neurological: AAOx3, no focal deficits  Musculoskeletal: Normal muscle tone, no atrophy, no rigidity, no contractions  Skin: No significant new skin lesions or rashes    MEDICATIONS:  MEDICATIONS  (STANDING):  apixaban 5 milliGRAM(s) Oral every 12 hours  atorvastatin 80 milliGRAM(s) Oral at bedtime  carvedilol 3.125 milliGRAM(s) Oral every 12 hours  clonazePAM Oral Disintegrating Tablet 1 milliGRAM(s) Oral at bedtime  dextrose 5%. 1000 milliLiter(s) (100 mL/Hr) IV Continuous <Continuous>  dextrose 5%. 1000 milliLiter(s) (50 mL/Hr) IV Continuous <Continuous>  dextrose 50% Injectable 12.5 Gram(s) IV Push once  dextrose 50% Injectable 25 Gram(s) IV Push once  dextrose 50% Injectable 25 Gram(s) IV Push once  glucagon  Injectable 1 milliGRAM(s) IntraMuscular once  insulin lispro (ADMELOG) corrective regimen sliding scale   SubCutaneous at bedtime  insulin lispro (ADMELOG) corrective regimen sliding scale   SubCutaneous three times a day before meals  methadone    Tablet 5 milliGRAM(s) Oral four times a day  pregabalin 150 milliGRAM(s) Oral three times a day  sertraline 100 milliGRAM(s) Oral daily  sodium chloride 0.9%. 1000 milliLiter(s) (100 mL/Hr) IV Continuous <Continuous>  traZODone 150 milliGRAM(s) Oral at bedtime    MEDICATIONS  (PRN):  aluminum hydroxide/magnesium hydroxide/simethicone Suspension 30 milliLiter(s) Oral every 4 hours PRN Dyspepsia  dextrose Oral Gel 15 Gram(s) Oral once PRN Blood Glucose LESS THAN 70 milliGRAM(s)/deciliter  HYDROmorphone  Injectable 2 milliGRAM(s) IV Push every 4 hours PRN Severe Pain (7 - 10)  ondansetron Injectable 4 milliGRAM(s) IV Push every 8 hours PRN Nausea and/or Vomiting      LABS: All Labs Reviewed:                  11.8   7.71  )-----------( 303      ( 06 Oct 2023 07:03 )             36.9     10-06    139  |  103  |  6<L>  ----------------------------<  198<H>  3.7   |  28  |  0.66    Ca    8.6      06 Oct 2023 07:03  Phos  3.0     10-06  Mg     1.4     10-06          Urinalysis Basic - ( 06 Oct 2023 07:03 )    Color: x / Appearance: x / SG: x / pH: x  Gluc: 198 mg/dL / Ketone: x  / Bili: x / Urobili: x   Blood: x / Protein: x / Nitrite: x   Leuk Esterase: x / RBC: x / WBC x   Sq Epi: x / Non Sq Epi: x / Bacteria: x          RADIOLOGY/EKG:    < from: TTE Echo Complete w/o Contrast w/ Doppler (10.06.23 @ 07:58) >  Summary:   1. Left ventricular ejection fraction, by visual estimation, is 55 to   60%.   2. Normal global left ventricular systolic function.   3. Normal left ventricular internal cavity size.   4. Mildly enlarged left atrium.   5. Trace mitral valve regurgitation.   6. Sclerotic aortic valve with normal opening.   7. Unable to assess estimated PA pressure, minimal TR.    < end of copied text >        CT Abdomen and Pelvis w/ IV Cont (10.04.23 @ 15:36) >    IMPRESSION:  No bowel obstruction or grossly thickened bowel wall. Colonic   diverticulosis without evidence for diverticulitis. Appendix within   normal limits.    Splenomegaly.      Cardiac Cath 3/2022  Conclusions:   STEMI due to thrombotic occlusion of small caliber LPL branches  arising from medium-caliber distal LCx supplying a relatively    small territory, successfully treated with angioplasty.  Mild  nonobstructive coronary artery disease elsewhere in co-dominant  circulation.         Patient is a 57y old  Male w/ PMH of HTN, HLD, CAD/MI (no stents but had a balloon), T2DM on insulin/metformin/jardiance with hx of DKA, Afib on Eliquis, h/o iron deficiency, Chronic Pain Syndrome with multiple neck surgeries (C2-C7 fusion) on dilaudid and methadone, who presents with a chief complaint of abdominal pain     Subjective: Patient was seen and examined this morning at bedside. Pt reports still having nausea, and was able to take a couple of sips of broth yesterday.   Overnight events: none    REVIEW OF SYSTEMS:  CONSTITUTIONAL: No weakness, fevers or chills  EYES/ENT: No visual changes;  No vertigo or throat pain   NECK: No pain or stiffness  RESPIRATORY: No cough, wheezing, hemoptysis; No shortness of breath  CARDIOVASCULAR: No chest pain or palpitations  GASTROINTESTINAL: +abdominal pain, +nausea, ; No vomiting, diarrhea or constipation.   GENITOURINARY: No dysuria, frequency or hematuria  NEUROLOGICAL: No numbness or weakness  SKIN: No itching, burning, rashes, or lesions       Vital Signs Last 24 Hrs  T(C): 36.6 (06 Oct 2023 12:53), Max: 36.6 (06 Oct 2023 12:53)  T(F): 97.8 (06 Oct 2023 12:53), Max: 97.8 (06 Oct 2023 12:53)  HR: 66 (06 Oct 2023 12:53) (57 - 71)  BP: 153/91 (06 Oct 2023 13:43) (153/91 - 171/95)  BP(mean): --  RR: 18 (06 Oct 2023 12:53) (17 - 18)  SpO2: 96% (06 Oct 2023 12:53) (95% - 96%)    Parameters below as of 06 Oct 2023 12:53  Patient On (Oxygen Delivery Method): room air        PHYSICAL EXAM  Constitutional: Pt lying in bed, awake and alert, NAD  HEENT: EOMI, normocephalic, moist mucous membranes  Neck: Soft and supple,   Respiratory: CTABL, No wheezing, rales or rhonchi  Cardiovascular: S1S2+, RRR, no M/G/R, reproducible chest pain with palpation of Left anterior chest  Gastrointestinal: BS+, soft, obese abdomen, epigastric tenderness, nondistended, no guarding, no rebound  Extremities: No calf pain or edema  Vascular: Peripheral pulses present  Neurological: AAOx3, no focal deficits  Musculoskeletal: Normal muscle tone, no atrophy, no rigidity, no contractions  Skin: No significant new skin lesions or rashes    MEDICATIONS:  MEDICATIONS  (STANDING):  apixaban 5 milliGRAM(s) Oral every 12 hours  atorvastatin 80 milliGRAM(s) Oral at bedtime  carvedilol 3.125 milliGRAM(s) Oral every 12 hours  clonazePAM Oral Disintegrating Tablet 1 milliGRAM(s) Oral at bedtime  dextrose 5%. 1000 milliLiter(s) (100 mL/Hr) IV Continuous <Continuous>  dextrose 5%. 1000 milliLiter(s) (50 mL/Hr) IV Continuous <Continuous>  dextrose 50% Injectable 12.5 Gram(s) IV Push once  dextrose 50% Injectable 25 Gram(s) IV Push once  dextrose 50% Injectable 25 Gram(s) IV Push once  glucagon  Injectable 1 milliGRAM(s) IntraMuscular once  insulin lispro (ADMELOG) corrective regimen sliding scale   SubCutaneous at bedtime  insulin lispro (ADMELOG) corrective regimen sliding scale   SubCutaneous three times a day before meals  methadone    Tablet 5 milliGRAM(s) Oral four times a day  pregabalin 150 milliGRAM(s) Oral three times a day  sertraline 100 milliGRAM(s) Oral daily  sodium chloride 0.9%. 1000 milliLiter(s) (100 mL/Hr) IV Continuous <Continuous>  traZODone 150 milliGRAM(s) Oral at bedtime    MEDICATIONS  (PRN):  aluminum hydroxide/magnesium hydroxide/simethicone Suspension 30 milliLiter(s) Oral every 4 hours PRN Dyspepsia  dextrose Oral Gel 15 Gram(s) Oral once PRN Blood Glucose LESS THAN 70 milliGRAM(s)/deciliter  HYDROmorphone  Injectable 2 milliGRAM(s) IV Push every 4 hours PRN Severe Pain (7 - 10)  ondansetron Injectable 4 milliGRAM(s) IV Push every 8 hours PRN Nausea and/or Vomiting      LABS: All Labs Reviewed:                  11.8   7.71  )-----------( 303      ( 06 Oct 2023 07:03 )             36.9     10-06    139  |  103  |  6<L>  ----------------------------<  198<H>  3.7   |  28  |  0.66    Ca    8.6      06 Oct 2023 07:03  Phos  3.0     10-06  Mg     1.4     10-06          Urinalysis Basic - ( 06 Oct 2023 07:03 )    Color: x / Appearance: x / SG: x / pH: x  Gluc: 198 mg/dL / Ketone: x  / Bili: x / Urobili: x   Blood: x / Protein: x / Nitrite: x   Leuk Esterase: x / RBC: x / WBC x   Sq Epi: x / Non Sq Epi: x / Bacteria: x          RADIOLOGY/EKG:    < from: TTE Echo Complete w/o Contrast w/ Doppler (10.06.23 @ 07:58) >  Summary:   1. Left ventricular ejection fraction, by visual estimation, is 55 to   60%.   2. Normal global left ventricular systolic function.   3. Normal left ventricular internal cavity size.   4. Mildly enlarged left atrium.   5. Trace mitral valve regurgitation.   6. Sclerotic aortic valve with normal opening.   7. Unable to assess estimated PA pressure, minimal TR.    < end of copied text >        CT Abdomen and Pelvis w/ IV Cont (10.04.23 @ 15:36) >    IMPRESSION:  No bowel obstruction or grossly thickened bowel wall. Colonic   diverticulosis without evidence for diverticulitis. Appendix within   normal limits.    Splenomegaly.      Cardiac Cath 3/2022  Conclusions:   STEMI due to thrombotic occlusion of small caliber LPL branches  arising from medium-caliber distal LCx supplying a relatively    small territory, successfully treated with angioplasty.  Mild  nonobstructive coronary artery disease elsewhere in co-dominant  circulation.         Initial (On Arrival)

## 2023-12-03 NOTE — ED ADULT NURSE REASSESSMENT NOTE - TEMPERATURE IN FAHRENHEIT (DEGREES F)
98.7
This was a shared visit with the VICENTE. I reviewed and verified the documentation and independently performed the documented:

## 2024-04-24 NOTE — DISCHARGE NOTE PROVIDER - NSDCCAREPROVSEEN_GEN_ALL_CORE_FT
# way catheter is currently disconnected and plugged per Urology. Pt states he is feeling good. Eating and drinking well. Heart echo was just completed. Denies any pain at this time Likely discharge tomorrow, no other new concerns at this time, Olivo and SP olivo are capped. Pt states when he feels the need to void and then uncapped olivo ad bladder drained. No discomfort noted, no clots. Urine clear goyo.   Jeremiah Robertson

## 2024-07-13 NOTE — PATIENT PROFILE ADULT - NSPROGENSOURCEINFO_GEN_A_NUR
[facility] ED  EMERGENCY DEPARTMENT ENCOUNTER        Patient Name: Franklin Mahmood  MRN: [id number]  Birthdate 1960  Date of evaluation: 7/13/2024  PCP: Thea Schuler  Note Started: 12:58 AM EDT 7/13/24      CHIEF COMPLAINT  Chest Pain (X3 hours )         HISTORY & PHYSICAL     HISTORY OF PRESENT ILLNESS  History from: Patient and wife    Limitations to history : None    Franklin Mahmood is a 63 y.o. male  has a past medical history of Acid reflux, Hyperlipidemia, Hypertension, and Vertigo. Patient presents to the ED complaining of burning midsternal chest pain for 3 hours. Does not radiate to back or shoulder. Patient says it feels like gas in chest.  Took Tums with no relief. Patient also complains of bilateral pedal edema, shortness of breath, and lightheadedness. Says the lightheadedness occurs within 10 seconds of standing up and has caused 5 falls in the last couple of days with LOC. He has hit his head. Patient denies pain in extremities due to falls and cough. History of drinking half a fifth of alcohol per day. Denies history of blood clots.      Old records reviewed: Yes    No other complaints, modifying factors or associated symptoms.  Nursing Notes were all reviewed and agreed with or any disagreements were addressed in the HPI.    I have reviewed the following from the nursing documentation.    Past Medical History:   Diagnosis Date    Acid reflux     Hyperlipidemia     Hypertension     Vertigo      Past Surgical History:   Procedure Laterality Date    COLONOSCOPY  10/6/15    divert    HERNIA REPAIR      PRE-MALIGNANT / BENIGN SKIN LESION EXCISION      Lump removed from under right arm    WISDOM TOOTH EXTRACTION      2 extracted and still has 2     Family History   Problem Relation Age of Onset    Cancer Sister     Diabetes Sister     Diabetes Mother      Social History     Socioeconomic History    Marital status:      Spouse name: Not on file    Number of children:  MG tablet Take 1 tablet by mouth      naproxen (NAPROSYN) 500 MG tablet Take 1 tablet by mouth 2 times daily as needed for Pain 20 tablet 0    Olmesartan Medoxomil-HCTZ (BENICAR HCT PO) Take  by mouth.        pravastatin (PRAVACHOL) 40 MG tablet Take 1 tablet by mouth daily      aspirin 81 MG EC tablet Take 1 tablet by mouth daily       Allergies   Allergen Reactions    Bactrim [Sulfamethoxazole-Trimethoprim] Diarrhea     Leads to dehydration and hypotension.       REVIEW OF SYSTEMS  All systems reviewed, pertinent positives per HPI otherwise noted to be negative.    PHYSICAL EXAM  ED Triage Vitals [07/13/24 0057]   BP Temp Temp src Pulse Respirations SpO2 Height Weight   (!) 132/103 97.7 °F (36.5 °C) -- 88 16 98 % -- --     GENERAL APPEARANCE: Awake and alert. Cooperative. no distress.  HENT: Normocephalic. Atraumatic. Mucous membranes are moist  NECK: Supple.  Full range of motion of the neck without stiffness or pain.  EYES: PERRL. EOM's grossly intact.  HEART/CHEST: RRR. No murmurs.  Chest wall is not tender to palpation.  LUNGS: Respirations unlabored. CTAB. Good air exchange. Speaking comfortably in full sentences.   ABDOMEN: No tenderness. Soft. Non-distended. No masses. No organomegaly. No guarding or rebound.   MUSCULOSKELETAL: mild bilateral extremity edema, lower extremities are equal bilaterally and nontender to palpation. Compartments soft.  No deformity.  No tenderness in the extremities.  All extremities neurovascularly intact.  SKIN: Warm and dry. No acute rashes.   NEUROLOGICAL: Alert and oriented.  No gross facial drooping. Strength 5/5, sensation intact.   PSYCHIATRIC: Normal mood and affect.      WORKUP     LABS  I have reviewed all labs for this visit.   Results for orders placed or performed during the hospital encounter of 07/13/24   CBC with Auto Differential   Result Value Ref Range    WBC 3.7 (L) 4.0 - 11.0 K/uL    RBC 4.36 4.20 - 5.90 M/uL    Hemoglobin 13.4 (L) 13.5 - 17.5 g/dL     patient

## 2024-10-07 NOTE — ED ADULT TRIAGE NOTE - INTERNATIONAL TRAVEL
-Patient presents to the clinic today for Ritux infusion  -Patient tolerated infusion well @ accelerated rates (cleared by pharmacy and Dr. Benavidez)  -Patient discharged in stable condition. Reviewed calendar/next appointment, all questions answered.   -Patient ambulated out of clinic with ease with family.   -Notes/Plan for next visit-     
Accelerated rates verified by 2nd nurse, BANDAR schwarz    1- 146ml/hr (73.4 ml)  2- 293.6 ml/hr (293.6ml)            
No

## 2024-10-22 NOTE — ED PROVIDER NOTE - NSTIMEPROVIDERCAREINITIATE_GEN_ER
well developed, well nourished , in no acute distress , ambulating without difficulty , normal communication ability 05-Feb-2022 10:24

## 2025-02-04 NOTE — PATIENT PROFILE ADULT - TRANSPORTATION
[FreeTextEntry1] : Mr. 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 Since last visit, he has been attending PT for his neck, and he notices significant improvement. He states his headaches are completely gone. He does not need to take the muscle relaxer anymore. Denies any numbness/tingling in the UE or LE.  Denies falls, dizziness, balance issues.
no

## 2025-04-29 NOTE — PROGRESS NOTE ADULT - NS_MD_PANP_GEN_ALL_CORE
82 yo male with pmhx of BPH, HLD, HTN, hepatic artery aneurysm, melanoma presenting with AMS, weakness, and vomiting.    He is unclear if he lost consciousness but remembers everything was "dark" and not sure if he had any vision changes.  His wife noticed that pt had "garbled speech" and was having both slurred speech and difficulty getting out words. No facial droop observed.   Denies history of stroke/TIA, MI, DVT/PE, seizures, and alcohol withdrawal.    #AMS, ?syncope  -Neuro consulted, check MRI of brain  -CT/CTA of head/neck neg  -check EEG  -Cardiology consulted; check Echo with bubble study, trops neg  -Alcohol level: 16, +THC on tox scrren    #hx of BPH, HLD, HTN  -cont home meds    Dispo:  wife updated at bedside.
Attending and PA/NP shared services statement (NON-critical care):

## 2025-05-25 NOTE — ED ADULT NURSE NOTE - NSIMPLEMENTINTERV_GEN_ALL_ED
Implemented All Fall with Harm Risk Interventions:  [place] to call system. Call bell, personal items and telephone within reach. Instruct patient to call for assistance. Room bathroom lighting operational. Non-slip footwear when patient is off stretcher. Physically safe environment: no spills, clutter or unnecessary equipment. Stretcher in lowest position, wheels locked, appropriate side rails in place. Provide visual cue, wrist band, yellow gown, etc. Monitor gait and stability. Monitor for mental status changes and reorient to person, place, and time. Review medications for side effects contributing to fall risk. Reinforce activity limits and safety measures with patient and family. Provide visual clues: red socks. no

## (undated) DEVICE — SET IV PUMP BLOOD 1VALVE 180FILTER NON-DEHP

## (undated) DEVICE — CATH IV SAFE BC 20G X 1.16" (PINK)

## (undated) DEVICE — MARKER ENDO SPOT EX

## (undated) DEVICE — CANISTER SUCTION 1200CC 10/SL

## (undated) DEVICE — TUBE O2 SUPL CRUSH RESIS CONN SOUTHSIDE ONLY

## (undated) DEVICE — CATH ELECHMSTAT  INJ 7FR 210CM

## (undated) DEVICE — PACK IV START WITH CHG

## (undated) DEVICE — ENDOCUFF VISION SZ 3 SM PRPL

## (undated) DEVICE — STERIS DEFENDO 3-PIECE KIT (AIR/WATER, SUCTION & BIOPSY VALVES)

## (undated) DEVICE — SYR IV POSIFLUSH NS 3ML 30/TY

## (undated) DEVICE — TUBING IV SET GRAVITY 3Y 100" MACRO

## (undated) DEVICE — TUBING SUCTION CONN 6FT STERILE

## (undated) DEVICE — TRAP QUICK CATCH  SINGL CHAMBER

## (undated) DEVICE — FORCEP RADIAL JAW 4 W NDL 2.4MM 2.8MM 240CM ORANGE DISP

## (undated) DEVICE — MASK OXYGEN PANORAMIC

## (undated) DEVICE — CATH IV SAFE BC 22G X 1" (BLUE)

## (undated) DEVICE — SNARE LRG

## (undated) DEVICE — VALVE BIOPSY

## (undated) DEVICE — TUBE RECTAL 24FR

## (undated) DEVICE — TUBING IV SET SECONDARY 34"

## (undated) DEVICE — SENS OXI DGT OXISENSOR II ADLN

## (undated) DEVICE — SUT HEWSON RETRIEVER

## (undated) DEVICE — RETRIEVER ROTH NET PLATINUM-UNIVERSAL

## (undated) DEVICE — CLAMP BX HOT RAD JAW 3

## (undated) DEVICE — ENDOCUFF VISION SZ 2 LG GRN

## (undated) DEVICE — SYR LUER SLIP TIP 50CC

## (undated) DEVICE — FORMALIN CUPS 10% BUFFERED

## (undated) DEVICE — SUCTION YANKAUER TAPERED BULBOUS NO VENT

## (undated) DEVICE — BRUSH COLONOSCOPY CYTOLOGY

## (undated) DEVICE — POLY TRAP ETRAP

## (undated) DEVICE — MASK O2 NON REBREATH 3IN1 ADULT

## (undated) DEVICE — CATH ELCTR GLIDE PRB 7FR

## (undated) DEVICE — ELCTR GROUNDING PAD ADULT COVIDIEN

## (undated) DEVICE — SNARE POLYP SENS 27MM 240CM

## (undated) DEVICE — SOL IRR POUR H2O 500ML

## (undated) DEVICE — TUBING CANNULA SALTER LABS NASAL ADULT 7FT

## (undated) DEVICE — TUBING ENDO EXT OLYMPUS 160 24HR USE

## (undated) DEVICE — SYR LUER SLIP TIP 30CC

## (undated) DEVICE — FORCEP RADIAL JAW 4 JUMBO 2.8MM 3.2MM 240CM ORANGE DISP

## (undated) DEVICE — Device

## (undated) DEVICE — NDL INJ SCLERO INTERJECT 23G

## (undated) DEVICE — TRAP SPECIMEN SPUTUM 40CC

## (undated) DEVICE — SOL IRR NS 0.9% 250ML